# Patient Record
Sex: FEMALE | Race: WHITE | NOT HISPANIC OR LATINO | Employment: OTHER | URBAN - METROPOLITAN AREA
[De-identification: names, ages, dates, MRNs, and addresses within clinical notes are randomized per-mention and may not be internally consistent; named-entity substitution may affect disease eponyms.]

---

## 2017-01-25 ENCOUNTER — TELEPHONE (OUTPATIENT)
Dept: GASTROENTEROLOGY | Facility: CLINIC | Age: 82
End: 2017-01-25

## 2017-01-25 NOTE — TELEPHONE ENCOUNTER
----- Message from Prudence Haynes MA sent at 1/25/2017  2:22 PM CST -----  Contact: self  [phone]  States she is returning your call from before lunch today, Wednesday, 1-25-17.

## 2017-01-30 RX ORDER — ATORVASTATIN CALCIUM 20 MG/1
TABLET, FILM COATED ORAL
Qty: 90 TABLET | Refills: 0 | Status: SHIPPED | OUTPATIENT
Start: 2017-01-30 | End: 2017-05-01 | Stop reason: SDUPTHER

## 2017-01-30 RX ORDER — AMLODIPINE BESYLATE 5 MG/1
TABLET ORAL
Qty: 90 TABLET | Refills: 0 | Status: SHIPPED | OUTPATIENT
Start: 2017-01-30 | End: 2017-05-01 | Stop reason: SDUPTHER

## 2017-03-27 RX ORDER — ALENDRONATE SODIUM 70 MG/1
TABLET ORAL
Qty: 12 TABLET | Refills: 1 | Status: SHIPPED | OUTPATIENT
Start: 2017-03-27 | End: 2017-09-28 | Stop reason: SDUPTHER

## 2017-04-07 ENCOUNTER — HOSPITAL ENCOUNTER (OUTPATIENT)
Dept: RADIOLOGY | Facility: HOSPITAL | Age: 82
Discharge: HOME | End: 2017-04-07
Attending: INTERNAL MEDICINE
Payer: MEDICARE

## 2017-04-07 ENCOUNTER — TELEPHONE (OUTPATIENT)
Dept: GASTROENTEROLOGY | Facility: CLINIC | Age: 82
End: 2017-04-07

## 2017-04-07 DIAGNOSIS — K86.3 CYST AND PSEUDOCYST OF PANCREAS: ICD-10-CM

## 2017-04-07 DIAGNOSIS — K86.2 CYST AND PSEUDOCYST OF PANCREAS: ICD-10-CM

## 2017-04-07 LAB
CREAT SERPL-MCNC: 0.9 MG/DL (ref 0.5–1.4)
SAMPLE: NORMAL

## 2017-04-07 PROCEDURE — A9585 GADOBUTROL INJECTION: HCPCS | Performed by: INTERNAL MEDICINE

## 2017-04-07 PROCEDURE — 25500020 PHARM REV CODE 255: Performed by: INTERNAL MEDICINE

## 2017-04-07 PROCEDURE — 74183 MRI ABD W/O CNTR FLWD CNTR: CPT | Mod: TC

## 2017-04-07 PROCEDURE — 76376 3D RENDER W/INTRP POSTPROCES: CPT | Mod: TC

## 2017-04-07 PROCEDURE — 74183 MRI ABD W/O CNTR FLWD CNTR: CPT | Mod: 26,GC,, | Performed by: RADIOLOGY

## 2017-04-07 PROCEDURE — 76376 3D RENDER W/INTRP POSTPROCES: CPT | Mod: 26,GC,, | Performed by: RADIOLOGY

## 2017-04-07 RX ORDER — GADOBUTROL 604.72 MG/ML
10 INJECTION INTRAVENOUS
Status: COMPLETED | OUTPATIENT
Start: 2017-04-07 | End: 2017-04-07

## 2017-04-07 RX ADMIN — GADOBUTROL 10 ML: 604.72 INJECTION INTRAVENOUS at 10:04

## 2017-04-07 NOTE — TELEPHONE ENCOUNTER
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

## 2017-04-25 ENCOUNTER — LAB VISIT (OUTPATIENT)
Dept: LAB | Facility: HOSPITAL | Age: 82
End: 2017-04-25
Attending: INTERNAL MEDICINE
Payer: MEDICARE

## 2017-04-25 DIAGNOSIS — E11.9 TYPE 2 DIABETES MELLITUS WITHOUT COMPLICATION: ICD-10-CM

## 2017-04-25 PROCEDURE — 83036 HEMOGLOBIN GLYCOSYLATED A1C: CPT

## 2017-04-25 PROCEDURE — 36415 COLL VENOUS BLD VENIPUNCTURE: CPT | Mod: PO

## 2017-04-26 LAB
ESTIMATED AVG GLUCOSE: 146 MG/DL
HBA1C MFR BLD HPLC: 6.7 %

## 2017-05-01 ENCOUNTER — TELEPHONE (OUTPATIENT)
Dept: INTERNAL MEDICINE | Facility: CLINIC | Age: 82
End: 2017-05-01

## 2017-05-01 RX ORDER — GLIMEPIRIDE 2 MG/1
TABLET ORAL
Qty: 90 TABLET | Refills: 0 | Status: SHIPPED | OUTPATIENT
Start: 2017-05-01 | End: 2017-07-29 | Stop reason: SDUPTHER

## 2017-05-01 RX ORDER — AMLODIPINE BESYLATE 5 MG/1
TABLET ORAL
Qty: 90 TABLET | Refills: 0 | Status: SHIPPED | OUTPATIENT
Start: 2017-05-01 | End: 2017-07-29 | Stop reason: SDUPTHER

## 2017-05-01 RX ORDER — ATORVASTATIN CALCIUM 20 MG/1
TABLET, FILM COATED ORAL
Qty: 90 TABLET | Refills: 0 | Status: SHIPPED | OUTPATIENT
Start: 2017-05-01 | End: 2017-07-29 | Stop reason: SDUPTHER

## 2017-05-01 RX ORDER — LISINOPRIL AND HYDROCHLOROTHIAZIDE 10; 12.5 MG/1; MG/1
TABLET ORAL
Qty: 152 TABLET | Refills: 2 | Status: SHIPPED | OUTPATIENT
Start: 2017-05-01 | End: 2018-01-16 | Stop reason: SDUPTHER

## 2017-05-01 RX ORDER — MONTELUKAST SODIUM 10 MG/1
TABLET ORAL
Qty: 90 TABLET | Refills: 2 | Status: SHIPPED | OUTPATIENT
Start: 2017-05-01 | End: 2017-10-20 | Stop reason: SDUPTHER

## 2017-05-02 NOTE — TELEPHONE ENCOUNTER
Left a message with pt's  to have Mrs. Costa to call the office for results.    Verbalized understanding.

## 2017-05-05 ENCOUNTER — TELEPHONE (OUTPATIENT)
Dept: INTERNAL MEDICINE | Facility: CLINIC | Age: 82
End: 2017-05-05

## 2017-05-05 NOTE — TELEPHONE ENCOUNTER
----- Message from Ria Cary sent at 5/4/2017  4:03 PM CDT -----  Contact: pt [phone]  Patient is returning a phone call.  Who left a message for the patient: Oc  Does patient know what this is regarding:  A message was sent  Comments:

## 2017-07-12 ENCOUNTER — TELEPHONE (OUTPATIENT)
Dept: INTERNAL MEDICINE | Facility: CLINIC | Age: 82
End: 2017-07-12

## 2017-07-12 DIAGNOSIS — Z12.31 OTHER SCREENING MAMMOGRAM: Primary | ICD-10-CM

## 2017-07-12 NOTE — TELEPHONE ENCOUNTER
----- Message from Delia Taveras sent at 7/12/2017 10:54 AM CDT -----  Contact: Self. C all  [phone]  Requesting a mammo order.

## 2017-07-25 NOTE — PROGRESS NOTES
No chief complaint on file.  .    HPI:     Sussy Costa is a 81 y.o. female who is known to me from my previous practice with chronic sinusitis with polyposis and AR presents for evaluation of a 10 day  history of nasal congestion and postnasal drip. She describes difficulty breathing at night. There is bilateral nasal obstruction. She does use sinus rinses or nasal sprays. She admits to midface pain and pressure and along the frontal sinus region.  She admits to rhinorrhea and postnasal drip. There is not maxillary tooth pain. She  admits to headaches.  She has had sinus or nasal surgery. There is no history of sinonasal trauma. She reports prior to this she had a URI and feels like the symptoms have progressed to include sinus pressure and pain.       Past Medical History:   Diagnosis Date    Asthma     Cyst of pancreas     liver    Diabetes mellitus type II     Eczema of hand     Glaucoma     Hyperlipidemia     Hypertension     Lichen planus     gums    Osteoporosis     Pulmonary nodules      Social History     Social History    Marital status:      Spouse name: sania    Number of children: 1    Years of education: N/A     Occupational History          Social History Main Topics    Smoking status: Never Smoker    Smokeless tobacco: Never Used    Alcohol use Yes      Comment: socially    Drug use: No    Sexual activity: Yes     Partners: Male     Birth control/ protection: Post-menopausal     Other Topics Concern    Not on file     Social History Narrative    She does not exercise regularly.     Past Surgical History:   Procedure Laterality Date    CATARACT EXTRACTION, BILATERAL      CHOLECYSTECTOMY      HYSTERECTOMY      nasal polyps       Family History   Problem Relation Age of Onset    Heart disease Father     Heart disease Brother     Hypertension Brother            Review of Systems  General: negative for chills, fever or weight loss  Psychological: negative  for mood changes or depression  Ophthalmic: negative for blurry vision, photophobia or eye pain  ENT: see HPI  Respiratory: no cough, shortness of breath, or wheezing  Cardiovascular: no chest pain or dyspnea on exertion  Gastrointestinal: no abdominal pain, change in bowel habits, or black/ bloody stools  Musculoskeletal: negative for gait disturbance or muscular weakness  Neurological: no syncope or seizures; no ataxia  Dermatological: negative for puritis,  rash and jaundice  Hematologic/lymphatic: no easy bruising, no new lumps or bumps      Physical Exam:    There were no vitals filed for this visit.    Constitutional: Well appearing / communicating without difficutly.  NAD.  Eyes: EOM I Bilaterally  Head/Face: Normocephalic.  Negative paranasal sinus pressure/tenderness.  Salivary glands WNL.  House Brackmann I Bilaterally.    Right Ear: Auricle normal appearance. External Auditory Canal within normal limits,TM w/o masses/lesions/perforations. TM mobility noted.   Left Ear: Auricle normal appearance. External Auditory Canal WNL,TM w/o masses/lesions/perforations. TM mobility noted.  Nose: No gross nasal septal deviation. Inferior Turbinates edematous 3+ bilaterally. No septal perforation. No masses/lesions. External nasal skin appears normal without masses/lesions.  Oral Cavity: Gingiva/lips within normal limits.  Dentition/gingiva healthy appearing. Mucus membranes moist. Floor of mouth soft, no masses palpated. Oral Tongue mobile. Hard Palate appears normal.    Oropharynx: Base of tongue appears normal. No masses/lesions noted. Tonsillar fossa/pharyngeal wall without lesions. Posterior oropharynx WNL.  Soft palate without masses. Midline uvula.   Neck/Lymphatic: No LAD I-VI bilaterally.  No thyromegaly.  No masses noted on exam.    Mirror laryngoscopy/nasopharyngoscopy: Active gag reflex.  Unable to perform.    Neuro/Psychiatric: AOx3.  Normal mood and affect.   Cardiovascular: Normal carotid pulses  bilaterally, no increasing jugular venous distention noted at cervical region bilaterally.    Respiratory: Normal respiratory effort, no stridor, no retractions noted.      See separate procedure note for nasal endoscopy.       Assessment:    ICD-10-CM ICD-9-CM    1. Other chronic sinusitis J32.8 473.8    2. Polyp of paranasal sinus J33.8 471.8    3. Chronic non-seasonal allergic rhinitis, unspecified trigger J30.89 477.9      The primary encounter diagnosis was Other chronic sinusitis. Diagnoses of Polyp of paranasal sinus and Chronic non-seasonal allergic rhinitis, unspecified trigger were also pertinent to this visit.      Plan:      Chronic sinusitis with nasal polyposis: Cultures taken from the middle meatus.  Will start Levaquin empirically as she is allergic to PCN.  Continue nasal saline saline irrigations. Will start Flonase BID and give depo medrol IM injection today.     Rosangela Isabel MD

## 2017-07-26 ENCOUNTER — OFFICE VISIT (OUTPATIENT)
Dept: OTOLARYNGOLOGY | Facility: CLINIC | Age: 82
End: 2017-07-26
Payer: MEDICARE

## 2017-07-26 ENCOUNTER — HOSPITAL ENCOUNTER (OUTPATIENT)
Dept: RADIOLOGY | Facility: HOSPITAL | Age: 82
Discharge: HOME | End: 2017-07-26
Attending: INTERNAL MEDICINE
Payer: MEDICARE

## 2017-07-26 VITALS
HEART RATE: 76 BPM | BODY MASS INDEX: 23.68 KG/M2 | DIASTOLIC BLOOD PRESSURE: 64 MMHG | SYSTOLIC BLOOD PRESSURE: 117 MMHG | WEIGHT: 142.31 LBS | TEMPERATURE: 97 F

## 2017-07-26 DIAGNOSIS — Z12.31 OTHER SCREENING MAMMOGRAM: ICD-10-CM

## 2017-07-26 DIAGNOSIS — J32.8 OTHER CHRONIC SINUSITIS: Primary | ICD-10-CM

## 2017-07-26 DIAGNOSIS — J30.89 CHRONIC NON-SEASONAL ALLERGIC RHINITIS, UNSPECIFIED TRIGGER: ICD-10-CM

## 2017-07-26 DIAGNOSIS — J33.8 POLYP OF PARANASAL SINUS: ICD-10-CM

## 2017-07-26 PROCEDURE — 87070 CULTURE OTHR SPECIMN AEROBIC: CPT

## 2017-07-26 PROCEDURE — 87186 SC STD MICRODIL/AGAR DIL: CPT

## 2017-07-26 PROCEDURE — 77063 BREAST TOMOSYNTHESIS BI: CPT | Mod: 26,,, | Performed by: RADIOLOGY

## 2017-07-26 PROCEDURE — 77067 SCR MAMMO BI INCL CAD: CPT | Mod: TC

## 2017-07-26 PROCEDURE — 99999 PR PBB SHADOW E&M-EST. PATIENT-LVL IV: CPT | Mod: PBBFAC,,, | Performed by: OTOLARYNGOLOGY

## 2017-07-26 PROCEDURE — 96372 THER/PROPH/DIAG INJ SC/IM: CPT | Mod: PBBFAC

## 2017-07-26 PROCEDURE — 87075 CULTR BACTERIA EXCEPT BLOOD: CPT

## 2017-07-26 PROCEDURE — 77067 SCR MAMMO BI INCL CAD: CPT | Mod: 26,,, | Performed by: RADIOLOGY

## 2017-07-26 PROCEDURE — 1126F AMNT PAIN NOTED NONE PRSNT: CPT | Mod: ,,, | Performed by: OTOLARYNGOLOGY

## 2017-07-26 PROCEDURE — 87077 CULTURE AEROBIC IDENTIFY: CPT

## 2017-07-26 PROCEDURE — 99214 OFFICE O/P EST MOD 30 MIN: CPT | Mod: 25,S$PBB,, | Performed by: OTOLARYNGOLOGY

## 2017-07-26 PROCEDURE — 99214 OFFICE O/P EST MOD 30 MIN: CPT | Mod: PBBFAC | Performed by: OTOLARYNGOLOGY

## 2017-07-26 PROCEDURE — 1159F MED LIST DOCD IN RCRD: CPT | Mod: ,,, | Performed by: OTOLARYNGOLOGY

## 2017-07-26 RX ORDER — METHYLPREDNISOLONE ACETATE 40 MG/ML
40 INJECTION, SUSPENSION INTRA-ARTICULAR; INTRALESIONAL; INTRAMUSCULAR; SOFT TISSUE
Status: COMPLETED | OUTPATIENT
Start: 2017-07-26 | End: 2017-07-26

## 2017-07-26 RX ORDER — LEVOFLOXACIN 500 MG/1
500 TABLET, FILM COATED ORAL DAILY
Qty: 14 TABLET | Refills: 0 | Status: SHIPPED | OUTPATIENT
Start: 2017-07-26 | End: 2017-08-09

## 2017-07-26 RX ORDER — CICLESONIDE 50 UG/1
2 SPRAY NASAL DAILY
Qty: 12.5 G | Refills: 4 | Status: SHIPPED | OUTPATIENT
Start: 2017-07-26 | End: 2018-02-08 | Stop reason: SDUPTHER

## 2017-07-26 RX ADMIN — METHYLPREDNISOLONE ACETATE 40 MG: 40 INJECTION, SUSPENSION INTRA-ARTICULAR; INTRALESIONAL; INTRAMUSCULAR; SOFT TISSUE at 09:07

## 2017-07-26 NOTE — PROCEDURES
Nasal/sinus endoscopy  Date/Time: 7/26/2017 9:02 AM  Performed by: SELENE JIMENEZ  Authorized by: SELENE JIMENEZ     Consent Done?:  Yes (Verbal)    PROCEDURE NOTE:  Nasal endoscopy   Preprocedure diagnosis:  Chronic sinusitis  Postprocedure diangosis:  Same  Complications:  None  Blood Loss:  None    Procedure in detail:  After verbal consent was obtained, the patient's nasal cavity was anesthesized using topical 1%lidocaine and Neosynepherine.  A rigid 0 degree endoscope was placed in first the right, then the left nasal cavity.  The inferior and middle turbinates were examined, and found to be normal bilaterally.  The middle meatus and maxillary antrum was also examined, and found to be normal bilaterally.  Copious crusting and purulence seen in the bilateral middle meatus.   The patient tolerated the procedure well and there were no complications.

## 2017-07-29 LAB — BACTERIA SPEC AEROBE CULT: NORMAL

## 2017-07-31 RX ORDER — AMLODIPINE BESYLATE 5 MG/1
TABLET ORAL
Qty: 90 TABLET | Refills: 0 | Status: SHIPPED | OUTPATIENT
Start: 2017-07-31 | End: 2017-10-20 | Stop reason: SDUPTHER

## 2017-07-31 RX ORDER — ATORVASTATIN CALCIUM 20 MG/1
TABLET, FILM COATED ORAL
Qty: 90 TABLET | Refills: 0 | Status: SHIPPED | OUTPATIENT
Start: 2017-07-31 | End: 2017-10-20 | Stop reason: SDUPTHER

## 2017-07-31 RX ORDER — GLIMEPIRIDE 2 MG/1
TABLET ORAL
Qty: 90 TABLET | Refills: 0 | Status: SHIPPED | OUTPATIENT
Start: 2017-07-31 | End: 2017-10-20 | Stop reason: SDUPTHER

## 2017-08-02 LAB — BACTERIA SPEC ANAEROBE CULT: NORMAL

## 2017-08-03 NOTE — PROGRESS NOTES
No chief complaint on file.  .    HPI:     Sussy Costa is a 81 y.o. female who is known to me from my previous practice with chronic sinusitis with polyposis and AR presents for evaluation of a 10 day  history of nasal congestion and postnasal drip. She describes difficulty breathing at night. There is bilateral nasal obstruction. She does use sinus rinses or nasal sprays. She admits to midface pain and pressure and along the frontal sinus region.  She admits to rhinorrhea and postnasal drip. There is not maxillary tooth pain. She  admits to headaches.  She has had sinus or nasal surgery. There is no history of sinonasal trauma. She reports prior to this she had a URI and feels like the symptoms have progressed to include sinus pressure and pain.      Interval HPI: She reports that she is feeling better.  She states that she is having less mucus than her previous visit.  She reports less pain and pressure. She is breathing better. She is tolerating the antibiotics well while taking a probiotic.       Past Medical History:   Diagnosis Date    Asthma     Cyst of pancreas     liver    Diabetes mellitus type II     Eczema of hand     Glaucoma     Hyperlipidemia     Hypertension     Lichen planus     gums    Osteoporosis     Pulmonary nodules      Social History     Social History    Marital status:      Spouse name: sania    Number of children: 1    Years of education: N/A     Occupational History          Social History Main Topics    Smoking status: Never Smoker    Smokeless tobacco: Never Used    Alcohol use Yes      Comment: socially    Drug use: No    Sexual activity: Yes     Partners: Male     Birth control/ protection: Post-menopausal     Other Topics Concern    Not on file     Social History Narrative    She does not exercise regularly.     Past Surgical History:   Procedure Laterality Date    CATARACT EXTRACTION, BILATERAL      CHOLECYSTECTOMY      HYSTERECTOMY       nasal polyps       Family History   Problem Relation Age of Onset    Heart disease Father     Heart disease Brother     Hypertension Brother            Review of Systems  General: negative for chills, fever or weight loss  Psychological: negative for mood changes or depression  Ophthalmic: negative for blurry vision, photophobia or eye pain  ENT: see HPI  Respiratory: no cough, shortness of breath, or wheezing  Cardiovascular: no chest pain or dyspnea on exertion  Gastrointestinal: no abdominal pain, change in bowel habits, or black/ bloody stools  Musculoskeletal: negative for gait disturbance or muscular weakness  Neurological: no syncope or seizures; no ataxia  Dermatological: negative for puritis,  rash and jaundice  Hematologic/lymphatic: no easy bruising, no new lumps or bumps      Physical Exam:    There were no vitals filed for this visit.    Constitutional: Well appearing / communicating without difficutly.  NAD.  Eyes: EOM I Bilaterally  Head/Face: Normocephalic.  Negative paranasal sinus pressure/tenderness.  Salivary glands WNL.  House Brackmann I Bilaterally.    Right Ear: Auricle normal appearance. External Auditory Canal within normal limits,TM w/o masses/lesions/perforations. TM mobility noted.   Left Ear: Auricle normal appearance. External Auditory Canal WNL,TM w/o masses/lesions/perforations. TM mobility noted.  Nose: No gross nasal septal deviation. Inferior Turbinates edematous 3+ bilaterally. No septal perforation. No masses/lesions. External nasal skin appears normal without masses/lesions.  Oral Cavity: Gingiva/lips within normal limits.  Dentition/gingiva healthy appearing. Mucus membranes moist. Floor of mouth soft, no masses palpated. Oral Tongue mobile. Hard Palate appears normal.    Oropharynx: Base of tongue appears normal. No masses/lesions noted. Tonsillar fossa/pharyngeal wall without lesions. Posterior oropharynx WNL.  Soft palate without masses. Midline uvula.    Neck/Lymphatic: No LAD I-VI bilaterally.  No thyromegaly.  No masses noted on exam.    Mirror laryngoscopy/nasopharyngoscopy: Active gag reflex.  Unable to perform.    Neuro/Psychiatric: AOx3.  Normal mood and affect.   Cardiovascular: Normal carotid pulses bilaterally, no increasing jugular venous distention noted at cervical region bilaterally.    Respiratory: Normal respiratory effort, no stridor, no retractions noted.      Cultures show psuedomonas a. pansensitive    Assessment:    ICD-10-CM ICD-9-CM    1. Other chronic sinusitis J32.8 473.8    2. Polyp of paranasal sinus J33.8 471.8    3. Chronic non-seasonal allergic rhinitis, unspecified trigger J30.89 477.9      The primary encounter diagnosis was Other chronic sinusitis. Diagnoses of Polyp of paranasal sinus and Chronic non-seasonal allergic rhinitis, unspecified trigger were also pertinent to this visit.      Plan:    Chronic sinusitis with nasal polyposis:   Complete Levaquin as she is allergic to PCN.  Continue nasal saline saline irrigations. Follow up in 2 weeks- plan repeat culture at that time if still with purulent crusting to ensure resolution of psuedomonas.     Rosangela Isabel MD

## 2017-08-04 ENCOUNTER — OFFICE VISIT (OUTPATIENT)
Dept: OTOLARYNGOLOGY | Facility: CLINIC | Age: 82
End: 2017-08-04
Payer: MEDICARE

## 2017-08-04 VITALS
HEART RATE: 80 BPM | DIASTOLIC BLOOD PRESSURE: 73 MMHG | TEMPERATURE: 97 F | HEIGHT: 65 IN | WEIGHT: 142.31 LBS | BODY MASS INDEX: 23.71 KG/M2 | SYSTOLIC BLOOD PRESSURE: 123 MMHG

## 2017-08-04 DIAGNOSIS — J30.89 CHRONIC NON-SEASONAL ALLERGIC RHINITIS, UNSPECIFIED TRIGGER: ICD-10-CM

## 2017-08-04 DIAGNOSIS — J32.8 OTHER CHRONIC SINUSITIS: Primary | ICD-10-CM

## 2017-08-04 DIAGNOSIS — J33.8 POLYP OF PARANASAL SINUS: ICD-10-CM

## 2017-08-04 PROCEDURE — 99213 OFFICE O/P EST LOW 20 MIN: CPT | Mod: S$PBB,,, | Performed by: OTOLARYNGOLOGY

## 2017-08-04 PROCEDURE — 1126F AMNT PAIN NOTED NONE PRSNT: CPT | Mod: ,,, | Performed by: OTOLARYNGOLOGY

## 2017-08-04 PROCEDURE — 99213 OFFICE O/P EST LOW 20 MIN: CPT | Mod: PBBFAC | Performed by: OTOLARYNGOLOGY

## 2017-08-04 PROCEDURE — 99999 PR PBB SHADOW E&M-EST. PATIENT-LVL III: CPT | Mod: PBBFAC,,, | Performed by: OTOLARYNGOLOGY

## 2017-08-04 PROCEDURE — 3008F BODY MASS INDEX DOCD: CPT | Mod: ,,, | Performed by: OTOLARYNGOLOGY

## 2017-08-04 PROCEDURE — 1159F MED LIST DOCD IN RCRD: CPT | Mod: ,,, | Performed by: OTOLARYNGOLOGY

## 2017-08-04 RX ORDER — FLUOCINONIDE 0.5 MG/G
CREAM TOPICAL
COMMUNITY
Start: 2017-05-01 | End: 2018-01-03 | Stop reason: SDUPTHER

## 2017-09-08 ENCOUNTER — OFFICE VISIT (OUTPATIENT)
Dept: OTOLARYNGOLOGY | Facility: CLINIC | Age: 82
End: 2017-09-08
Payer: MEDICARE

## 2017-09-08 VITALS
TEMPERATURE: 98 F | BODY MASS INDEX: 22.78 KG/M2 | DIASTOLIC BLOOD PRESSURE: 65 MMHG | SYSTOLIC BLOOD PRESSURE: 118 MMHG | WEIGHT: 136.88 LBS | HEART RATE: 76 BPM

## 2017-09-08 DIAGNOSIS — J30.89 CHRONIC NON-SEASONAL ALLERGIC RHINITIS, UNSPECIFIED TRIGGER: ICD-10-CM

## 2017-09-08 DIAGNOSIS — J33.8 POLYP OF PARANASAL SINUS: ICD-10-CM

## 2017-09-08 DIAGNOSIS — J32.8 OTHER CHRONIC SINUSITIS: Primary | ICD-10-CM

## 2017-09-08 PROCEDURE — 1159F MED LIST DOCD IN RCRD: CPT | Mod: ,,, | Performed by: OTOLARYNGOLOGY

## 2017-09-08 PROCEDURE — 99999 PR PBB SHADOW E&M-EST. PATIENT-LVL IV: CPT | Mod: PBBFAC,,, | Performed by: OTOLARYNGOLOGY

## 2017-09-08 PROCEDURE — 99213 OFFICE O/P EST LOW 20 MIN: CPT | Mod: S$PBB,,, | Performed by: OTOLARYNGOLOGY

## 2017-09-08 PROCEDURE — 1126F AMNT PAIN NOTED NONE PRSNT: CPT | Mod: ,,, | Performed by: OTOLARYNGOLOGY

## 2017-09-08 PROCEDURE — 99214 OFFICE O/P EST MOD 30 MIN: CPT | Mod: PBBFAC | Performed by: OTOLARYNGOLOGY

## 2017-09-08 PROCEDURE — 3074F SYST BP LT 130 MM HG: CPT | Mod: ,,, | Performed by: OTOLARYNGOLOGY

## 2017-09-08 PROCEDURE — 3078F DIAST BP <80 MM HG: CPT | Mod: ,,, | Performed by: OTOLARYNGOLOGY

## 2017-09-08 NOTE — PROGRESS NOTES
Chief Complaint   Patient presents with    Allergies     follow up   .    HPI:     Sussy Costa is a 81 y.o. female who is known to me from my previous practice with chronic sinusitis with polyposis and AR presents for evaluation of a 10 day  history of nasal congestion and postnasal drip. She describes difficulty breathing at night. There is bilateral nasal obstruction. She does use sinus rinses or nasal sprays. She admits to midface pain and pressure and along the frontal sinus region.  She admits to rhinorrhea and postnasal drip. There is not maxillary tooth pain. She  admits to headaches.  She has had sinus or nasal surgery. There is no history of sinonasal trauma. She reports prior to this she had a URI and feels like the symptoms have progressed to include sinus pressure and pain.      Interval HPI: She reports that she is feeling better.  She states that she is having no further mucus after completing Levaquin. She has been doing daily nasal saline rinses that have been clear.  She reports less pain and pressure. She is breathing better. She is tolerating the antibiotics well while taking a probiotic.       Past Medical History:   Diagnosis Date    Asthma     Cyst of pancreas     liver    Diabetes mellitus type II     Eczema of hand     Glaucoma     Hyperlipidemia     Hypertension     Lichen planus     gums    Osteoporosis     Pulmonary nodules      Social History     Social History    Marital status:      Spouse name: sania    Number of children: 1    Years of education: N/A     Occupational History          Social History Main Topics    Smoking status: Never Smoker    Smokeless tobacco: Never Used    Alcohol use Yes      Comment: socially    Drug use: No    Sexual activity: Yes     Partners: Male     Birth control/ protection: Post-menopausal     Other Topics Concern    Not on file     Social History Narrative    She does not exercise regularly.     Past  Surgical History:   Procedure Laterality Date    CATARACT EXTRACTION, BILATERAL      CHOLECYSTECTOMY      HYSTERECTOMY      nasal polyps       Family History   Problem Relation Age of Onset    Heart disease Father     Heart disease Brother     Hypertension Brother            Review of Systems  General: negative for chills, fever or weight loss  Psychological: negative for mood changes or depression  Ophthalmic: negative for blurry vision, photophobia or eye pain  ENT: see HPI  Respiratory: no cough, shortness of breath, or wheezing  Cardiovascular: no chest pain or dyspnea on exertion  Gastrointestinal: no abdominal pain, change in bowel habits, or black/ bloody stools  Musculoskeletal: negative for gait disturbance or muscular weakness  Neurological: no syncope or seizures; no ataxia  Dermatological: negative for puritis,  rash and jaundice  Hematologic/lymphatic: no easy bruising, no new lumps or bumps      Physical Exam:    Vitals:    09/08/17 1602   BP: 118/65   Pulse: 76   Temp: 97.6 °F (36.4 °C)       Constitutional: Well appearing / communicating without difficutly.  NAD.  Eyes: EOM I Bilaterally  Head/Face: Normocephalic.  Negative paranasal sinus pressure/tenderness.  Salivary glands WNL.  House Brackmann I Bilaterally.    Right Ear: Auricle normal appearance. External Auditory Canal within normal limits,TM w/o masses/lesions/perforations. TM mobility noted.   Left Ear: Auricle normal appearance. External Auditory Canal WNL,TM w/o masses/lesions/perforations. TM mobility noted.  Nose: No gross nasal septal deviation. Inferior Turbinates edematous 3+ bilaterally. No septal perforation. No masses/lesions. External nasal skin appears normal without masses/lesions.  Oral Cavity: Gingiva/lips within normal limits.  Dentition/gingiva healthy appearing. Mucus membranes moist. Floor of mouth soft, no masses palpated. Oral Tongue mobile. Hard Palate appears normal.    Oropharynx: Base of tongue appears  normal. No masses/lesions noted. Tonsillar fossa/pharyngeal wall without lesions. Posterior oropharynx WNL.  Soft palate without masses. Midline uvula.   Neck/Lymphatic: No LAD I-VI bilaterally.  No thyromegaly.  No masses noted on exam.    Mirror laryngoscopy/nasopharyngoscopy: Active gag reflex.  Unable to perform.    Neuro/Psychiatric: AOx3.  Normal mood and affect.   Cardiovascular: Normal carotid pulses bilaterally, no increasing jugular venous distention noted at cervical region bilaterally.    Respiratory: Normal respiratory effort, no stridor, no retractions noted.        Assessment:    ICD-10-CM ICD-9-CM    1. Other chronic sinusitis J32.8 473.8    2. Polyp of paranasal sinus J33.8 471.8    3. Chronic non-seasonal allergic rhinitis, unspecified trigger J30.89 477.9      The primary encounter diagnosis was Other chronic sinusitis. Diagnoses of Polyp of paranasal sinus and Chronic non-seasonal allergic rhinitis, unspecified trigger were also pertinent to this visit.      Plan:      Continue nasal saline saline irrigations. Continue singulair and Flonase OTC. Follow up in 6 mos for recheck.     .Rosangela Isabel MD

## 2017-09-12 RX ORDER — ALBUTEROL SULFATE 90 UG/1
AEROSOL, METERED RESPIRATORY (INHALATION)
Qty: 18 G | Refills: 5 | Status: SHIPPED | OUTPATIENT
Start: 2017-09-12 | End: 2019-10-28 | Stop reason: SDUPTHER

## 2017-09-28 RX ORDER — ALENDRONATE SODIUM 70 MG/1
TABLET ORAL
Qty: 12 TABLET | Refills: 0 | Status: SHIPPED | OUTPATIENT
Start: 2017-09-28 | End: 2017-11-09

## 2017-10-03 RX ORDER — ALENDRONATE SODIUM 70 MG/1
TABLET ORAL
Qty: 12 TABLET | Refills: 0 | Status: SHIPPED | OUTPATIENT
Start: 2017-10-03 | End: 2018-02-05

## 2017-10-05 RX ORDER — FLUTICASONE PROPIONATE AND SALMETEROL 500; 50 UG/1; UG/1
1 POWDER RESPIRATORY (INHALATION) 2 TIMES DAILY
Qty: 180 EACH | Refills: 3 | Status: SHIPPED | OUTPATIENT
Start: 2017-10-05 | End: 2020-01-14 | Stop reason: SDUPTHER

## 2017-10-05 NOTE — TELEPHONE ENCOUNTER
----- Message from Red Somers sent at 10/5/2017  8:17 AM CDT -----  Contact: Self [phone]  Pt would like to speak with the nurse she states she left a message yesterday and has not heard back regarding a refill on ADVAIR DISKUS sent to [facility] mail order,please call

## 2017-10-19 ENCOUNTER — TELEPHONE (OUTPATIENT)
Dept: INTERNAL MEDICINE | Facility: CLINIC | Age: 82
End: 2017-10-19

## 2017-10-19 DIAGNOSIS — I10 ESSENTIAL HYPERTENSION, BENIGN: Primary | ICD-10-CM

## 2017-10-19 DIAGNOSIS — E78.5 HYPERLIPIDEMIA, UNSPECIFIED HYPERLIPIDEMIA TYPE: ICD-10-CM

## 2017-10-19 DIAGNOSIS — E11.9 DIABETES MELLITUS WITHOUT COMPLICATION: ICD-10-CM

## 2017-10-19 RX ORDER — MONTELUKAST SODIUM 10 MG/1
TABLET ORAL
Qty: 90 TABLET | Refills: 1 | OUTPATIENT
Start: 2017-10-19

## 2017-10-19 RX ORDER — ATORVASTATIN CALCIUM 20 MG/1
TABLET, FILM COATED ORAL
Qty: 90 TABLET | Refills: 0 | OUTPATIENT
Start: 2017-10-19

## 2017-10-19 RX ORDER — AMLODIPINE BESYLATE 5 MG/1
TABLET ORAL
Qty: 90 TABLET | Refills: 0 | OUTPATIENT
Start: 2017-10-19

## 2017-10-19 RX ORDER — GLIMEPIRIDE 2 MG/1
TABLET ORAL
Qty: 90 TABLET | Refills: 0 | OUTPATIENT
Start: 2017-10-19

## 2017-10-19 NOTE — TELEPHONE ENCOUNTER
Spoke with pt to have her scheduled for f/u on HTN, DM.    Verbalized understanding and agreeable to scheduled appt on 11/9 and will have f/u labs on 11/2/17.

## 2017-10-19 NOTE — TELEPHONE ENCOUNTER
----- Message from Josiane Anaya sent at 10/19/2017 12:10 PM CDT -----  Contact: Self [phone]  Patient is returning a phone call.  Who left a message for the patient: Oc  Does patient know what this is regarding:    Comments:

## 2017-10-20 RX ORDER — GLIMEPIRIDE 2 MG/1
TABLET ORAL
Qty: 90 TABLET | Refills: 0 | Status: SHIPPED | OUTPATIENT
Start: 2017-10-20 | End: 2018-01-16 | Stop reason: SDUPTHER

## 2017-10-20 RX ORDER — AMLODIPINE BESYLATE 5 MG/1
TABLET ORAL
Qty: 90 TABLET | Refills: 0 | Status: SHIPPED | OUTPATIENT
Start: 2017-10-20 | End: 2018-01-16 | Stop reason: SDUPTHER

## 2017-10-20 RX ORDER — ATORVASTATIN CALCIUM 20 MG/1
TABLET, FILM COATED ORAL
Qty: 90 TABLET | Refills: 0 | Status: SHIPPED | OUTPATIENT
Start: 2017-10-20 | End: 2018-01-16 | Stop reason: SDUPTHER

## 2017-10-20 RX ORDER — MONTELUKAST SODIUM 10 MG/1
TABLET ORAL
Qty: 90 TABLET | Refills: 1 | Status: SHIPPED | OUTPATIENT
Start: 2017-10-20 | End: 2018-04-16 | Stop reason: SDUPTHER

## 2017-11-02 ENCOUNTER — LAB VISIT (OUTPATIENT)
Dept: LAB | Facility: HOSPITAL | Age: 82
End: 2017-11-02
Attending: INTERNAL MEDICINE
Payer: MEDICARE

## 2017-11-02 DIAGNOSIS — E11.9 DIABETES MELLITUS WITHOUT COMPLICATION: ICD-10-CM

## 2017-11-02 DIAGNOSIS — I10 ESSENTIAL HYPERTENSION, BENIGN: ICD-10-CM

## 2017-11-02 DIAGNOSIS — E78.5 HYPERLIPIDEMIA, UNSPECIFIED HYPERLIPIDEMIA TYPE: ICD-10-CM

## 2017-11-02 LAB
ALBUMIN SERPL BCP-MCNC: 3.5 G/DL
ALP SERPL-CCNC: 60 U/L
ALT SERPL W/O P-5'-P-CCNC: 18 U/L
ANION GAP SERPL CALC-SCNC: 10 MMOL/L
AST SERPL-CCNC: 22 U/L
BASOPHILS # BLD AUTO: 0.11 K/UL
BASOPHILS NFR BLD: 0.8 %
BILIRUB SERPL-MCNC: 0.6 MG/DL
BUN SERPL-MCNC: 22 MG/DL
CALCIUM SERPL-MCNC: 10.5 MG/DL
CHLORIDE SERPL-SCNC: 105 MMOL/L
CHOLEST SERPL-MCNC: 130 MG/DL
CHOLEST/HDLC SERPL: 2.5 {RATIO}
CO2 SERPL-SCNC: 27 MMOL/L
CREAT SERPL-MCNC: 1 MG/DL
DIFFERENTIAL METHOD: ABNORMAL
EOSINOPHIL # BLD AUTO: 1.4 K/UL
EOSINOPHIL NFR BLD: 10.7 %
ERYTHROCYTE [DISTWIDTH] IN BLOOD BY AUTOMATED COUNT: 14.1 %
EST. GFR  (AFRICAN AMERICAN): >60 ML/MIN/1.73 M^2
EST. GFR  (NON AFRICAN AMERICAN): 53 ML/MIN/1.73 M^2
ESTIMATED AVG GLUCOSE: 148 MG/DL
GLUCOSE SERPL-MCNC: 110 MG/DL
HBA1C MFR BLD HPLC: 6.8 %
HCT VFR BLD AUTO: 36.4 %
HDLC SERPL-MCNC: 52 MG/DL
HDLC SERPL: 40 %
HGB BLD-MCNC: 11.7 G/DL
IMM GRANULOCYTES # BLD AUTO: 0.05 K/UL
IMM GRANULOCYTES NFR BLD AUTO: 0.4 %
LDLC SERPL CALC-MCNC: 58.8 MG/DL
LYMPHOCYTES # BLD AUTO: 3.1 K/UL
LYMPHOCYTES NFR BLD: 23.7 %
MCH RBC QN AUTO: 29.6 PG
MCHC RBC AUTO-ENTMCNC: 32.1 G/DL
MCV RBC AUTO: 92 FL
MONOCYTES # BLD AUTO: 1.1 K/UL
MONOCYTES NFR BLD: 8.6 %
NEUTROPHILS # BLD AUTO: 7.4 K/UL
NEUTROPHILS NFR BLD: 55.8 %
NONHDLC SERPL-MCNC: 78 MG/DL
NRBC BLD-RTO: 0 /100 WBC
PLATELET # BLD AUTO: 322 K/UL
PMV BLD AUTO: 12 FL
POTASSIUM SERPL-SCNC: 3.6 MMOL/L
PROT SERPL-MCNC: 7.3 G/DL
RBC # BLD AUTO: 3.95 M/UL
SODIUM SERPL-SCNC: 142 MMOL/L
TRIGL SERPL-MCNC: 96 MG/DL
TSH SERPL DL<=0.005 MIU/L-ACNC: 2.27 UIU/ML
WBC # BLD AUTO: 13.24 K/UL

## 2017-11-02 PROCEDURE — 80053 COMPREHEN METABOLIC PANEL: CPT

## 2017-11-02 PROCEDURE — 80061 LIPID PANEL: CPT

## 2017-11-02 PROCEDURE — 85025 COMPLETE CBC W/AUTO DIFF WBC: CPT

## 2017-11-02 PROCEDURE — 84443 ASSAY THYROID STIM HORMONE: CPT

## 2017-11-02 PROCEDURE — 83036 HEMOGLOBIN GLYCOSYLATED A1C: CPT

## 2017-11-02 PROCEDURE — 36415 COLL VENOUS BLD VENIPUNCTURE: CPT | Mod: PO

## 2017-11-09 ENCOUNTER — OFFICE VISIT (OUTPATIENT)
Dept: PODIATRY | Facility: CLINIC | Age: 82
End: 2017-11-09
Payer: MEDICARE

## 2017-11-09 ENCOUNTER — OFFICE VISIT (OUTPATIENT)
Dept: INTERNAL MEDICINE | Facility: CLINIC | Age: 82
End: 2017-11-09
Payer: MEDICARE

## 2017-11-09 VITALS
RESPIRATION RATE: 22 BRPM | HEART RATE: 103 BPM | TEMPERATURE: 98 F | DIASTOLIC BLOOD PRESSURE: 74 MMHG | BODY MASS INDEX: 23.21 KG/M2 | HEIGHT: 65 IN | WEIGHT: 139.31 LBS | SYSTOLIC BLOOD PRESSURE: 156 MMHG

## 2017-11-09 VITALS
HEIGHT: 65 IN | BODY MASS INDEX: 22.66 KG/M2 | HEART RATE: 91 BPM | SYSTOLIC BLOOD PRESSURE: 150 MMHG | WEIGHT: 136 LBS | DIASTOLIC BLOOD PRESSURE: 73 MMHG

## 2017-11-09 DIAGNOSIS — E11.9 DIABETES MELLITUS WITHOUT COMPLICATION: Primary | ICD-10-CM

## 2017-11-09 DIAGNOSIS — Z78.0 POSTMENOPAUSAL STATUS: ICD-10-CM

## 2017-11-09 DIAGNOSIS — E11.9 ENCOUNTER FOR DIABETIC FOOT EXAM: ICD-10-CM

## 2017-11-09 DIAGNOSIS — L60.9 LOOSE TOENAIL: Primary | ICD-10-CM

## 2017-11-09 DIAGNOSIS — I15.2 HYPERTENSION ASSOCIATED WITH DIABETES: ICD-10-CM

## 2017-11-09 DIAGNOSIS — E11.69 DYSLIPIDEMIA ASSOCIATED WITH TYPE 2 DIABETES MELLITUS: ICD-10-CM

## 2017-11-09 DIAGNOSIS — J45.31 MILD PERSISTENT ASTHMATIC BRONCHITIS WITH ACUTE EXACERBATION: ICD-10-CM

## 2017-11-09 DIAGNOSIS — E78.5 DYSLIPIDEMIA ASSOCIATED WITH TYPE 2 DIABETES MELLITUS: ICD-10-CM

## 2017-11-09 DIAGNOSIS — M81.0 SENILE OSTEOPOROSIS: ICD-10-CM

## 2017-11-09 DIAGNOSIS — E11.59 HYPERTENSION ASSOCIATED WITH DIABETES: ICD-10-CM

## 2017-11-09 PROCEDURE — 99214 OFFICE O/P EST MOD 30 MIN: CPT | Mod: S$PBB,,, | Performed by: INTERNAL MEDICINE

## 2017-11-09 PROCEDURE — 99999 PR PBB SHADOW E&M-EST. PATIENT-LVL III: CPT | Mod: PBBFAC,,, | Performed by: PODIATRIST

## 2017-11-09 PROCEDURE — 99213 OFFICE O/P EST LOW 20 MIN: CPT | Mod: PBBFAC,27,PO | Performed by: INTERNAL MEDICINE

## 2017-11-09 PROCEDURE — 99999 PR PBB SHADOW E&M-EST. PATIENT-LVL III: CPT | Mod: PBBFAC,,, | Performed by: INTERNAL MEDICINE

## 2017-11-09 PROCEDURE — 99203 OFFICE O/P NEW LOW 30 MIN: CPT | Mod: S$PBB,,, | Performed by: PODIATRIST

## 2017-11-09 PROCEDURE — 99213 OFFICE O/P EST LOW 20 MIN: CPT | Mod: PBBFAC,PO | Performed by: PODIATRIST

## 2017-11-09 RX ORDER — LEVOFLOXACIN 500 MG/1
500 TABLET, FILM COATED ORAL DAILY
Qty: 7 TABLET | Refills: 0 | Status: ON HOLD | OUTPATIENT
Start: 2017-11-09 | End: 2017-12-30 | Stop reason: HOSPADM

## 2017-11-09 RX ORDER — INSULIN PUMP SYRINGE, 3 ML
EACH MISCELLANEOUS
Qty: 1 EACH | Refills: 0 | Status: SHIPPED | OUTPATIENT
Start: 2017-11-09 | End: 2020-01-07

## 2017-11-09 NOTE — PROGRESS NOTES
CC:     Foot exam       HPI:   The patient is a 82 y.o.  female  who presents for a diabetic foot exam.     Patient reports no presence of abnormal sensation to the feet .    History of diabetic foot ulcers: none   History of foot surgery: none.     Shoes worn today:  Sandals  She complains of left loose toenail, bumped her foot a couple a weeks ago and injured her toenail.   No toe pain today.            Primary care doctor is: EZ Casillas MD  Chief Complaint   Patient presents with    Diabetic Foot Exam     dr casillas  11/9/17    Nail Problem     left great toe           Past Medical History:   Diagnosis Date    Asthma     Cyst of pancreas     liver    Diabetes mellitus type II     Eczema of hand     Glaucoma     Hyperlipidemia     Hypertension     Lichen planus     gums    Osteoporosis     Pulmonary nodules            Current Outpatient Prescriptions on File Prior to Visit   Medication Sig Dispense Refill    acetaminophen (TYLENOL EXTRA STRENGTH) 500 MG tablet Take by mouth.      alendronate (FOSAMAX) 70 MG tablet TAKE ONE TABLET BY MOUTH EVERY 7 DAYS 12 tablet 0    amlodipine (NORVASC) 5 MG tablet TAKE ONE TABLET BY MOUTH ONE TIME DAILY  90 tablet 0    ascorbic acid (VITAMIN C) 100 MG tablet Take 100 mg by mouth once daily.      atorvastatin (LIPITOR) 20 MG tablet TAKE ONE TABLET BY MOUTH IN THE EVENING FOR CHOLESTEROL 90 tablet 0    blood sugar diagnostic (CONTOUR TEST STRIPS) Strp 1 strip by MISCELLANEOUS route 2 (two) times daily. 100 each 6    blood sugar diagnostic Strp 1 strip by Misc.(Non-Drug; Combo Route) route once daily. 100 strip 11    calcium carbonate 220 mg capsule Take 250 mg by mouth 2 (two) times daily with meals.        cod liver oil Oil Take by mouth.      fluocinonide (LIDEX) 0.05 % gel Apply 1 application topically 2 (two) times daily.        fluocinonide 0.05% (LIDEX) 0.05 % cream       fluticasone-salmeterol 500-50 mcg/dose (ADVAIR) 500-50 mcg/dose DsDv diskus  inhaler Inhale 1 puff into the lungs 2 (two) times daily. 180 each 3    glimepiride (AMARYL) 2 MG tablet TAKE ONE TABLET BY MOUTH IN THE MORNING WITH BREAKFAST 90 tablet 0    lancets (MICROLET LANCET) Misc Check blood sugar 2 times daily 100 each 3    latanoprost (XALATAN) 0.005 % ophthalmic solution Inject into the eye. 1 Drops Ophthalmic Every evening      levalbuterol (XOPENEX) 0.63 mg/3 mL nebulizer solution Take 3 mLs (0.63 mg total) by nebulization 3 (three) times daily as needed for Wheezing. 36 mL 6    lisinopril-hydrochlorothiazide (PRINZIDE,ZESTORETIC) 10-12.5 mg per tablet TAKE TWO TABLETS BY MOUTH DAILY  152 tablet 2    meclizine (ANTIVERT) 12.5 mg tablet Take 1 tablet (12.5 mg total) by mouth 2 (two) times daily as needed. 30 tablet 0    metformin (GLUCOPHAGE-XR) 500 MG 24 hr tablet Take 1 tablet (500 mg total) by mouth daily with breakfast. 90 tablet 5    montelukast (SINGULAIR) 10 mg tablet TAKE ONE TABLET BY MOUTH NIGHTLY 90 tablet 1    NEBULIZER ACCESSORIES (NEBULIZER MISC)       neomycin-polymyxin-dexamethasone (MAXITROL) 3.5mg/mL-10,000 unit/mL-0.1 % DrpS       olopatadine (PATANOL) 0.1 % ophthalmic solution Inject into the eye. 1 Drops Ophthalmic Twice a day       OMNARIS 50 mcg Spry 2 sprays by Each Nare route once daily. 12.5 g 4    SODIUM CHLORIDE/SODIUM BICARB (NEILMED SINUS RINSE COMPLETE NASL) by Nasal route 2 (two) times daily. Uses in distilled water.      triamcinolone acetonide 0.1% (KENALOG) 0.1 % cream Apply topically 2 (two) times daily. 454 g 1    VENTOLIN HFA 90 mcg/actuation inhaler INHALE 2 PUFFS INTO THE LUNGS EVERY 6 HOURS AS NEEDED 18 g 5    vitamin D 185 MG Tab Take 185 mg by mouth once daily.        [DISCONTINUED] alendronate (FOSAMAX) 70 MG tablet TAKE ONE TABLET BY MOUTH EVERY 7 DAYS 12 tablet 0     No current facility-administered medications on file prior to visit.            Review of patient's allergies indicates:   Allergen Reactions    Aspirin Other  (See Comments)     Asthma    TRIAD OF NASAL POLYPS, ASA  ALLERGY AND ASTHMA.        Aspirin Other (See Comments)    Nsaids (non-steroidal anti-inflammatory drug) Other (See Comments)     AVOID DUE TO TRIAD OF ASA ALLERGY, NASAL POLYPS,AND ASTHMA    Penicillin g Other (See Comments)           Social History     Social History    Marital status:      Spouse name: sania    Number of children: 1    Years of education: N/A     Occupational History          Social History Main Topics    Smoking status: Never Smoker    Smokeless tobacco: Never Used    Alcohol use Yes      Comment: socially    Drug use: No    Sexual activity: Yes     Partners: Male     Birth control/ protection: Post-menopausal     Other Topics Concern    Not on file     Social History Narrative    She does not exercise regularly.           ROS:  General ROS: negative  Respiratory ROS: no cough, shortness of breath, or wheezing  Cardiovascular ROS: no chest pain or dyspnea on exertion  Musculoskeletal ROS: negative  Neurological ROS:   negative for - numbness/tingling  Dermatological ROS: negative      LAST HbA1c:   Hemoglobin A1C   Date Value Ref Range Status   11/02/2017 6.8 (H) 4.0 - 5.6 % Final     Comment:     According to ADA guidelines, hemoglobin A1c <7.0% represents  optimal control in non-pregnant diabetic patients. Different  metrics may apply to specific patient populations.   Standards of Medical Care in Diabetes-2016.  For the purpose of screening for the presence of diabetes:  <5.7%     Consistent with the absence of diabetes  5.7-6.4%  Consistent with increasing risk for diabetes   (prediabetes)  >or=6.5%  Consistent with diabetes  Currently, no consensus exists for use of hemoglobin A1c  for diagnosis of diabetes for children.  This Hemoglobin A1c assay has significant interference with fetal   hemoglobin   (HbF). The results are invalid for patients with abnormal amounts of   HbF,   including those with known  "Hereditary Persistence   of Fetal Hemoglobin. Heterozygous hemoglobin variants (HbAS, HbAC,   HbAD, HbAE, HbA2) do not significantly interfere with this assay;   however, presence of multiple variants in a sample may impact the %   interference.     04/25/2017 6.7 (H) 4.5 - 6.2 % Final     Comment:     According to ADA guidelines, hemoglobin A1C <7.0% represents  optimal control in non-pregnant diabetic patients.  Different  metrics may apply to specific populations.   Standards of Medical Care in Diabetes - 2016.  For the purpose of screening for the presence of diabetes:  <5.7%     Consistent with the absence of diabetes  5.7-6.4%  Consistent with increasing risk for diabetes   (prediabetes)  >or=6.5%  Consistent with diabetes  Currently no consensus exists for use of hemoglobin A1C  for diagnosis of diabetes for children.     03/16/2016 6.6 (H) 4.5 - 6.2 % Final           EXAM:   Vitals:    11/09/17 0929   BP: (!) 150/73   Pulse: 91   Weight: 61.7 kg (136 lb)   Height: 5' 5" (1.651 m)         General: alert, no distress    Vascular:   Dorsalis pedis:   2+ bilateral.   Posterior Tibial:   1+ bilateral.   3 seconds capillary refill time   Temperature of toes are warm to touch.   reduced hair growth on the feet.    Edema on feet:   none   Varicosities:  none    Dermatological:    Skin: thin,  Warm and dry. no hyperpigmentation, vitiligo, or suspicious lesions  Nails: toenails 1-5 L and 1-5 R  Are yellow thin and brittle.  left hallux toenail loose from nail bed.  Nail bed is dry and slightly hyperkeratotic   No wounds. No redness, no evidence of drainage.  Callus:  None  Open Wounds: None  Ecchymoses is not observed.      Erythema:  none .     Interdigital spaces: clean, dry and without evidence of break in skin integrity      Neurological:    normal pin prick sensation and normal vibratory sensation  Eldred normal      Musculoskeletal:     Muscle strength: 5/5, adequate ROM, adequate strength     Right foot:  no " gross deformity   Left foot: no gross deformity             ASSESSMENT/PLAN:      I counseled the patient on her conditions, their implications and medical management.       Loose toenail    Encounter for diabetic foot exam        Foot care education;  I trimmed the loose toenail as a courtesy.  Apply vaseline to nail bed daily.   Check feet daily monitor for worsening signs and symptoms.       Return in about 1 year (around 11/9/2018) for diabetic foot exam, or sooner if concerned.             Adela Pacheco DPM  NPI: [id number]

## 2017-11-09 NOTE — PATIENT INSTRUCTIONS
How to Check Your Feet    Below are tips to help you look for foot problems. Try to check your feet at the same time each day, such as when you get out of bed in the morning.    · Check the top of each foot. The tops of toes, back of the heel, and outer edge of the foot can get a lot of rubbing from poor-fitting shoes.    · Check the bottom of each foot. Daily wear and tear often leads to problems at pressure spots.    · Check the toes and nails. Fungal infections often occur between toes. Toenail problems can also be a sign of fungal infections or lead to breaks in the skin.    · Check your shoes, too. Loose objects inside a shoe can injure the foot. Use your hand to feel inside your shoes for things like sourav, loose stitching, or rough areas that could irritate your skin.        Diabetic Foot Care    Diabetes can lead to a number of different foot complications. Fortunately, most of these complications can be prevented with a little extra foot care. If diabetes is not well controlled, the high blood sugar can cause damage to blood vessels and result in poor circulation to the foot. When the skin does not get enough blood flow, it becomes prone to pressure sores and ulcers, which heal slowly.  High blood sugar can also damage nerves, interfering with the ability to feel pain and pressure. When you cant feel your foot normally, it is easy to injure your skin, bones and joints without knowing it. For these reasons diabetes increases the risk of fungal infections, bunions and ulcers. Deep ulcers can lead to bone infection. Gangrene is the most serious foot complication of diabetes. It usually occurs on the tips of the toes as blacked areas of skin. The black area is dead tissue. In severe cases, gangrene spreads to involve the entire toe, other toes and the entire foot. Foot or toe amputation may be required. Good foot care and blood sugar control can prevent this.    Home Care  1. Wear comfortable, proper fitting  shoes.  2. Wash your feet daily with warm water and mild soap.  3. After drying, apply a moisturizing cream or lotion.  4. Check your feet daily for skin breaks, blisters, swelling, or redness. Look between your toes also.  5. Wear cotton socks and change them every day.  6. Trim toe nails carefully and do not cut your cuticles.  7. Strive to keep your blood sugar under control with a combination of medicines, diet and activity.  8. If you smoke and have diabetes, it is very important that you stop. Smoking reduces blood flow to your foot.  9. Avoid activities that increase your risk of foot injury:  · Do not walk barefoot.  · Do not use heating pads or hot water bottles on your feet.  · Do not put your foot in a hot tub without first checking the temperature with your hand.  10) Schedule yearly foot exams.    Follow Up  with your doctor or as advised by our staff. Report any cut, puncture, scrape, other injury, blister, ingrown toenail or ulcer on your foot.    Get Prompt Medical Attention  if any of the following occur:  -- Open ulcer with pus draining from the wound  -- Increasing foot or leg pain  -- New areas of redness or swelling or tender areas of the foot    © 2900-8669 [employer]. [address] [place] [address], [place] [address]. All rights reserved. This information is not intended as a substitute for professional medical care. Always follow your healthcare professional's instructions.

## 2017-11-09 NOTE — PROGRESS NOTES
History of present illness:  82-year-old female in today for general health assessment and also following up on several chronic medical conditions including diabetes mellitus, hypertension dyslipidemia and others.  The patient also has history of asthmatic bronchitis and is having an acute flare in the last 2 weeks.  She is having some cough slight wheezing.  No fever no chills mildly productive sputum.  She is using her maintenance and as needed inhalers she has an aerosol nebulizer at home but does not use the present time    Current medication:  All medications noted and reviewed in the electronic medical record medication list    Review of systems:  General: no fever, chills, generalized body aches. No unexpected weight loss.  Eyes:  No visual disturbances.  HEENT:  No hoarseness, dysphagia, ear pain.  Cardiovascular: no chest pain, palpitations, cough, exertional limb pain.  GI: no nausea, vomiting.  No abdominal pain. No change in bowel habits.  No melena, no hematochezia.  : no dysuria. No change in the color or character of the urine. No urinary frequency.  Neurologic:  No focal neurological complaints.  No headaches.  Skin:  No rashes or other concerns.  Psych:  No emotional issues    Past medical history, past surgical history, family medical history and social histories are all noted reviewed and are electronic medical record history sections.    Health screenings:  Mammogram July 2017.  Bone densitometry May 2015.  She has had both pneumococcal vaccines.  Colonoscopy 2011.    Physical examination:  GENERAL:  Alert, appropriately groomed, no acute distress.  VS: Blood pressure taken manually by this examiner is 138/76.  EYES: sclerae white ,nonicteric. PERRL.  HEENT:  Normocephalic. Ear canals and tympanic membranes normal. Mouth and pharynx normal. No thyromegaly. Trachea midline and freely mobile.  LUNGS:  No rales there are faint end-expiratory wheezes at the bases.  No significant prolongation of  expiratory phase and no use of the accessory muscles of respiration.  CARDIOVASCULAR:  Normal heart sounds.  No significant murmur. Carotids full bilaterally without bruit.  Pedal pulses intact .  No abdominal bruit.  No peripheral extremity edema.  GI: the abdomen is soft, no distension. No masses , tenderness, organomegaly.     LYMPHATIC:  No axillary, inguinal , cervical adenopathy.  MUSCULOSKELETAL:  Range of motion, stability and strength of the right and left upper and lower extremities normal. No swollen or tender joints  NEUROLOGIC:  DTR's normal. No gross motor or sensory deficits apparent, gait normal.  SKIN:  No rashes.   MS:  Alert, oriented , affect and mood all appropriate  Diabetic foot exam:    Visual Inspection:  Normal -  Bilateral    Pedal Pulses:   Right: Present  Left: Present    Posterior tibialis:   Right:Present  Left: Present    Data:  Lab data noted and reviewed from November 2, 2017.  Glycohemoglobin 6.8.  Other labs noted and reviewed.  She does have mild microalbuminuria with microalbumin creatinine ratio 36.7.    Impression:  82-year-old lady with several chronic medical conditions.  Recent flare of asthmatic bronchitis.  Diabetes mellitus is well-controlled.  Hypertension appears to be reasonably controlled.  Dyslipidemia on statin therapy.  Osteoporosis on bisphosphonate therapy she reports for 5-6 years.    Plan:  Levaquin 500 milligrams daily for 7 days.  She is instructed to use her home nebulizer the next for 5 days until she continues to make significant improvements.  Add Mucinex twice a day.  Update bone densitometry.  We also discussed potential hiatus from her bisphosphonate therapy if her bone densitometry shows significant improvement.  Influenza vaccine today.  Return to clinic 6 months.

## 2017-11-13 ENCOUNTER — APPOINTMENT (OUTPATIENT)
Dept: RADIOLOGY | Facility: CLINIC | Age: 82
End: 2017-11-13
Attending: INTERNAL MEDICINE
Payer: MEDICARE

## 2017-11-13 DIAGNOSIS — Z78.0 POSTMENOPAUSAL STATUS: ICD-10-CM

## 2017-11-13 PROCEDURE — 77080 DXA BONE DENSITY AXIAL: CPT | Mod: 26,,, | Performed by: INTERNAL MEDICINE

## 2017-11-13 PROCEDURE — 77080 DXA BONE DENSITY AXIAL: CPT | Mod: TC

## 2017-11-30 ENCOUNTER — TELEPHONE (OUTPATIENT)
Dept: INTERNAL MEDICINE | Facility: CLINIC | Age: 82
End: 2017-11-30

## 2017-11-30 DIAGNOSIS — M81.0 SENILE OSTEOPOROSIS: Primary | ICD-10-CM

## 2017-11-30 NOTE — TELEPHONE ENCOUNTER
Do not stop alendronate at this time.  Because her bone density has declined she will probably need a change , but I would like for her to see one of our osteoporosis specialists (Jaquan)  For an evaluation and rec's in this regard.  Ref entered

## 2017-11-30 NOTE — TELEPHONE ENCOUNTER
----- Message from Ria Cary sent at 11/30/2017 12:44 PM CST -----  Contact: pt [phone]  Pt would like a call from the nurse in regards to should the pt  stop taking a medication (alendronate (FOSAMAX) 70 MG tablet) from the results of her bone density test,please advise pt

## 2017-12-15 ENCOUNTER — TELEPHONE (OUTPATIENT)
Dept: INTERNAL MEDICINE | Facility: CLINIC | Age: 82
End: 2017-12-15

## 2017-12-15 RX ORDER — PREDNISONE 20 MG/1
TABLET ORAL
Qty: 8 TABLET | Refills: 0 | Status: ON HOLD | OUTPATIENT
Start: 2017-12-15 | End: 2017-12-30

## 2017-12-15 NOTE — TELEPHONE ENCOUNTER
Spoke with pt who advises that she has been experiencing congestion and has a hx of asthma.    She is audibly wheezing.    She is hoping for a rx for prednisone.    Pt is unable to come in for an appt as her daughter just passed away and they are preparing for the , which is scheduled for Tuesday.    Please advise.    Thanks.

## 2017-12-15 NOTE — TELEPHONE ENCOUNTER
Spoke with pt to advise.    Verbalized understanding and referral generated to Dorinda, to contact the pt to schedule with PATRICE.

## 2017-12-15 NOTE — TELEPHONE ENCOUNTER
Noted that a small course of steroids have been sent to the pt's pharmacy.    Spoke with pt to advise.    Verbalized understanding.

## 2017-12-27 ENCOUNTER — HOSPITAL ENCOUNTER (INPATIENT)
Facility: HOSPITAL | Age: 82
LOS: 3 days | Discharge: HOME | DRG: 871 | End: 2017-12-30
Attending: EMERGENCY MEDICINE | Admitting: INTERNAL MEDICINE
Payer: MEDICARE

## 2017-12-27 ENCOUNTER — NURSE TRIAGE (OUTPATIENT)
Dept: ADMINISTRATIVE | Facility: CLINIC | Age: 82
End: 2017-12-27

## 2017-12-27 DIAGNOSIS — R06.02 SHORTNESS OF BREATH: ICD-10-CM

## 2017-12-27 DIAGNOSIS — J45.901 EXACERBATION OF ASTHMA, UNSPECIFIED ASTHMA SEVERITY, UNSPECIFIED WHETHER PERSISTENT: ICD-10-CM

## 2017-12-27 DIAGNOSIS — E11.9 TYPE 2 DIABETES MELLITUS WITHOUT COMPLICATION, WITHOUT LONG-TERM CURRENT USE OF INSULIN: ICD-10-CM

## 2017-12-27 DIAGNOSIS — J45.901 ASTHMA WITH ACUTE EXACERBATION, UNSPECIFIED ASTHMA SEVERITY, UNSPECIFIED WHETHER PERSISTENT: ICD-10-CM

## 2017-12-27 DIAGNOSIS — J96.01 ACUTE RESPIRATORY FAILURE WITH HYPOXIA: ICD-10-CM

## 2017-12-27 DIAGNOSIS — J18.9 PNEUMONIA OF LEFT LOWER LOBE DUE TO INFECTIOUS ORGANISM: Primary | ICD-10-CM

## 2017-12-27 DIAGNOSIS — A41.9 SEPSIS, DUE TO UNSPECIFIED ORGANISM: ICD-10-CM

## 2017-12-27 DIAGNOSIS — I10 HYPERTENSION, UNSPECIFIED TYPE: ICD-10-CM

## 2017-12-27 DIAGNOSIS — J18.9 PNEUMONIA OF BOTH LUNGS DUE TO INFECTIOUS ORGANISM, UNSPECIFIED PART OF LUNG: ICD-10-CM

## 2017-12-27 LAB
ALBUMIN SERPL BCP-MCNC: 3.2 G/DL
ALLENS TEST: ABNORMAL
ALP SERPL-CCNC: 49 U/L
ALT SERPL W/O P-5'-P-CCNC: 16 U/L
ANION GAP SERPL CALC-SCNC: 14 MMOL/L
ANISOCYTOSIS BLD QL SMEAR: SLIGHT
AST SERPL-CCNC: 18 U/L
BACTERIA #/AREA URNS AUTO: NORMAL /HPF
BASOPHILS # BLD AUTO: 0.11 K/UL
BASOPHILS NFR BLD: 0.3 %
BILIRUB SERPL-MCNC: 1.3 MG/DL
BILIRUB UR QL STRIP: NEGATIVE
BNP SERPL-MCNC: 278 PG/ML
BUN SERPL-MCNC: 23 MG/DL
CALCIUM SERPL-MCNC: 10.1 MG/DL
CHLORIDE SERPL-SCNC: 103 MMOL/L
CLARITY UR REFRACT.AUTO: CLEAR
CO2 SERPL-SCNC: 24 MMOL/L
COLOR UR AUTO: YELLOW
CREAT SERPL-MCNC: 1.3 MG/DL
DIFFERENTIAL METHOD: ABNORMAL
EOSINOPHIL # BLD AUTO: 0 K/UL
EOSINOPHIL NFR BLD: 0 %
ERYTHROCYTE [DISTWIDTH] IN BLOOD BY AUTOMATED COUNT: 14.5 %
EST. GFR  (AFRICAN AMERICAN): 44.5 ML/MIN/1.73 M^2
EST. GFR  (NON AFRICAN AMERICAN): 38.6 ML/MIN/1.73 M^2
ESTIMATED AVG GLUCOSE: 154 MG/DL
GLUCOSE SERPL-MCNC: 274 MG/DL
GLUCOSE UR QL STRIP: ABNORMAL
HBA1C MFR BLD HPLC: 7 %
HCO3 UR-SCNC: 26.7 MMOL/L (ref 24–28)
HCT VFR BLD AUTO: 37.8 %
HGB BLD-MCNC: 12.4 G/DL
HGB UR QL STRIP: NEGATIVE
HYALINE CASTS UR QL AUTO: 1 /LPF
HYPOCHROMIA BLD QL SMEAR: ABNORMAL
IMM GRANULOCYTES # BLD AUTO: 0.4 K/UL
IMM GRANULOCYTES NFR BLD AUTO: 1.1 %
KETONES UR QL STRIP: ABNORMAL
LACTATE SERPL-SCNC: 2.5 MMOL/L
LDH SERPL L TO P-CCNC: 2.25 MMOL/L (ref 0.5–2.2)
LEUKOCYTE ESTERASE UR QL STRIP: NEGATIVE
LIPASE SERPL-CCNC: 40 U/L
LYMPHOCYTES # BLD AUTO: 1.8 K/UL
LYMPHOCYTES NFR BLD: 5.1 %
MCH RBC QN AUTO: 29.9 PG
MCHC RBC AUTO-ENTMCNC: 32.8 G/DL
MCV RBC AUTO: 91 FL
MICROSCOPIC COMMENT: NORMAL
MONOCYTES # BLD AUTO: 1.8 K/UL
MONOCYTES NFR BLD: 5 %
NEUTROPHILS # BLD AUTO: 31.4 K/UL
NEUTROPHILS NFR BLD: 88.5 %
NITRITE UR QL STRIP: NEGATIVE
NRBC BLD-RTO: 0 /100 WBC
OVALOCYTES BLD QL SMEAR: ABNORMAL
PCO2 BLDA: 45.9 MMHG (ref 35–45)
PH SMN: 7.37 [PH] (ref 7.35–7.45)
PH UR STRIP: 5 [PH] (ref 5–8)
PLATELET # BLD AUTO: 289 K/UL
PLATELET BLD QL SMEAR: ABNORMAL
PMV BLD AUTO: 12.3 FL
PO2 BLDA: 25 MMHG (ref 40–60)
POC BE: 1 MMOL/L
POC SATURATED O2: 42 % (ref 95–100)
POC TCO2: 28 MMOL/L (ref 24–29)
POCT GLUCOSE: 322 MG/DL (ref 70–110)
POCT GLUCOSE: 341 MG/DL (ref 70–110)
POCT GLUCOSE: 368 MG/DL (ref 70–110)
POIKILOCYTOSIS BLD QL SMEAR: SLIGHT
POLYCHROMASIA BLD QL SMEAR: ABNORMAL
POTASSIUM SERPL-SCNC: 3.9 MMOL/L
PROCALCITONIN SERPL IA-MCNC: 3.26 NG/ML
PROT SERPL-MCNC: 7.2 G/DL
PROT UR QL STRIP: NEGATIVE
RBC # BLD AUTO: 4.15 M/UL
RBC #/AREA URNS AUTO: 0 /HPF (ref 0–4)
SAMPLE: ABNORMAL
SITE: ABNORMAL
SODIUM SERPL-SCNC: 141 MMOL/L
SP GR UR STRIP: >=1.03 (ref 1–1.03)
SQUAMOUS #/AREA URNS AUTO: 0 /HPF
TROPONIN I SERPL DL<=0.01 NG/ML-MCNC: 0.02 NG/ML
TROPONIN I SERPL DL<=0.01 NG/ML-MCNC: 0.06 NG/ML
URN SPEC COLLECT METH UR: ABNORMAL
UROBILINOGEN UR STRIP-ACNC: NEGATIVE EU/DL
WBC # BLD AUTO: 35.42 K/UL
WBC #/AREA URNS AUTO: 1 /HPF (ref 0–5)
YEAST UR QL AUTO: NORMAL

## 2017-12-27 PROCEDURE — 85025 COMPLETE CBC W/AUTO DIFF WBC: CPT

## 2017-12-27 PROCEDURE — 83036 HEMOGLOBIN GLYCOSYLATED A1C: CPT

## 2017-12-27 PROCEDURE — 25000003 PHARM REV CODE 250: Performed by: EMERGENCY MEDICINE

## 2017-12-27 PROCEDURE — 94640 AIRWAY INHALATION TREATMENT: CPT

## 2017-12-27 PROCEDURE — 81001 URINALYSIS AUTO W/SCOPE: CPT

## 2017-12-27 PROCEDURE — 99285 EMERGENCY DEPT VISIT HI MDM: CPT | Mod: 25

## 2017-12-27 PROCEDURE — 83690 ASSAY OF LIPASE: CPT

## 2017-12-27 PROCEDURE — 93005 ELECTROCARDIOGRAM TRACING: CPT

## 2017-12-27 PROCEDURE — 25500020 PHARM REV CODE 255: Performed by: EMERGENCY MEDICINE

## 2017-12-27 PROCEDURE — 83880 ASSAY OF NATRIURETIC PEPTIDE: CPT

## 2017-12-27 PROCEDURE — 63600175 PHARM REV CODE 636 W HCPCS: Performed by: EMERGENCY MEDICINE

## 2017-12-27 PROCEDURE — 27000221 HC OXYGEN, UP TO 24 HOURS

## 2017-12-27 PROCEDURE — 96372 THER/PROPH/DIAG INJ SC/IM: CPT

## 2017-12-27 PROCEDURE — 83605 ASSAY OF LACTIC ACID: CPT

## 2017-12-27 PROCEDURE — 25000003 PHARM REV CODE 250: Performed by: INTERNAL MEDICINE

## 2017-12-27 PROCEDURE — 25000242 PHARM REV CODE 250 ALT 637 W/ HCPCS: Performed by: EMERGENCY MEDICINE

## 2017-12-27 PROCEDURE — 99223 1ST HOSP IP/OBS HIGH 75: CPT | Mod: AI,,, | Performed by: INTERNAL MEDICINE

## 2017-12-27 PROCEDURE — 96361 HYDRATE IV INFUSION ADD-ON: CPT

## 2017-12-27 PROCEDURE — 99285 EMERGENCY DEPT VISIT HI MDM: CPT | Mod: ,,, | Performed by: EMERGENCY MEDICINE

## 2017-12-27 PROCEDURE — 80053 COMPREHEN METABOLIC PANEL: CPT

## 2017-12-27 PROCEDURE — 96374 THER/PROPH/DIAG INJ IV PUSH: CPT

## 2017-12-27 PROCEDURE — 84484 ASSAY OF TROPONIN QUANT: CPT

## 2017-12-27 PROCEDURE — 11000001 HC ACUTE MED/SURG PRIVATE ROOM

## 2017-12-27 PROCEDURE — 96375 TX/PRO/DX INJ NEW DRUG ADDON: CPT

## 2017-12-27 PROCEDURE — 87040 BLOOD CULTURE FOR BACTERIA: CPT

## 2017-12-27 PROCEDURE — 84145 PROCALCITONIN (PCT): CPT

## 2017-12-27 PROCEDURE — 82962 GLUCOSE BLOOD TEST: CPT

## 2017-12-27 PROCEDURE — 63600175 PHARM REV CODE 636 W HCPCS: Performed by: INTERNAL MEDICINE

## 2017-12-27 PROCEDURE — 93010 ELECTROCARDIOGRAM REPORT: CPT | Mod: ,,, | Performed by: INTERNAL MEDICINE

## 2017-12-27 RX ORDER — AMLODIPINE BESYLATE 5 MG/1
5 TABLET ORAL DAILY
Status: DISCONTINUED | OUTPATIENT
Start: 2017-12-28 | End: 2017-12-30 | Stop reason: HOSPADM

## 2017-12-27 RX ORDER — RAMELTEON 8 MG/1
8 TABLET ORAL NIGHTLY PRN
Status: DISCONTINUED | OUTPATIENT
Start: 2017-12-27 | End: 2017-12-30 | Stop reason: HOSPADM

## 2017-12-27 RX ORDER — CEFTRIAXONE 1 G/1
1 INJECTION, POWDER, FOR SOLUTION INTRAMUSCULAR; INTRAVENOUS
Status: COMPLETED | OUTPATIENT
Start: 2017-12-27 | End: 2017-12-27

## 2017-12-27 RX ORDER — METHYLPREDNISOLONE SOD SUCC 125 MG
125 VIAL (EA) INJECTION ONCE
Status: COMPLETED | OUTPATIENT
Start: 2017-12-27 | End: 2017-12-27

## 2017-12-27 RX ORDER — INSULIN ASPART 100 [IU]/ML
2 INJECTION, SOLUTION INTRAVENOUS; SUBCUTANEOUS
Status: DISCONTINUED | OUTPATIENT
Start: 2017-12-27 | End: 2017-12-28

## 2017-12-27 RX ORDER — HYDROCODONE BITARTRATE AND ACETAMINOPHEN 5; 325 MG/1; MG/1
1 TABLET ORAL EVERY 8 HOURS PRN
Status: DISCONTINUED | OUTPATIENT
Start: 2017-12-27 | End: 2017-12-30 | Stop reason: HOSPADM

## 2017-12-27 RX ORDER — AZITHROMYCIN 250 MG/1
500 TABLET, FILM COATED ORAL DAILY
Status: COMPLETED | OUTPATIENT
Start: 2017-12-28 | End: 2017-12-30

## 2017-12-27 RX ORDER — CHOLECALCIFEROL (VITAMIN D3) 25 MCG
1000 TABLET ORAL DAILY
Status: DISCONTINUED | OUTPATIENT
Start: 2017-12-27 | End: 2017-12-30 | Stop reason: HOSPADM

## 2017-12-27 RX ORDER — OLOPATADINE HYDROCHLORIDE 2 MG/ML
1 SOLUTION/ DROPS OPHTHALMIC DAILY
Status: DISCONTINUED | OUTPATIENT
Start: 2017-12-27 | End: 2017-12-30 | Stop reason: HOSPADM

## 2017-12-27 RX ORDER — AZITHROMYCIN 250 MG/1
500 TABLET, FILM COATED ORAL
Status: COMPLETED | OUTPATIENT
Start: 2017-12-27 | End: 2017-12-27

## 2017-12-27 RX ORDER — LISINOPRIL AND HYDROCHLOROTHIAZIDE 10; 12.5 MG/1; MG/1
2 TABLET ORAL DAILY
Status: DISCONTINUED | OUTPATIENT
Start: 2017-12-28 | End: 2017-12-30

## 2017-12-27 RX ORDER — ALBUTEROL SULFATE 0.83 MG/ML
2.5 SOLUTION RESPIRATORY (INHALATION) EVERY 4 HOURS PRN
Status: DISCONTINUED | OUTPATIENT
Start: 2017-12-27 | End: 2017-12-30 | Stop reason: HOSPADM

## 2017-12-27 RX ORDER — INSULIN ASPART 100 [IU]/ML
0-5 INJECTION, SOLUTION INTRAVENOUS; SUBCUTANEOUS
Status: DISCONTINUED | OUTPATIENT
Start: 2017-12-27 | End: 2017-12-30 | Stop reason: HOSPADM

## 2017-12-27 RX ORDER — AMOXICILLIN 250 MG
1 CAPSULE ORAL 2 TIMES DAILY
Status: DISCONTINUED | OUTPATIENT
Start: 2017-12-27 | End: 2017-12-30 | Stop reason: HOSPADM

## 2017-12-27 RX ORDER — IPRATROPIUM BROMIDE AND ALBUTEROL SULFATE 2.5; .5 MG/3ML; MG/3ML
3 SOLUTION RESPIRATORY (INHALATION)
Status: COMPLETED | OUTPATIENT
Start: 2017-12-27 | End: 2017-12-27

## 2017-12-27 RX ORDER — IBUPROFEN 200 MG
16 TABLET ORAL
Status: DISCONTINUED | OUTPATIENT
Start: 2017-12-27 | End: 2017-12-30 | Stop reason: HOSPADM

## 2017-12-27 RX ORDER — ONDANSETRON 8 MG/1
8 TABLET, ORALLY DISINTEGRATING ORAL EVERY 8 HOURS PRN
Status: DISCONTINUED | OUTPATIENT
Start: 2017-12-27 | End: 2017-12-30 | Stop reason: HOSPADM

## 2017-12-27 RX ORDER — PREDNISONE 20 MG/1
40 TABLET ORAL DAILY
Status: DISCONTINUED | OUTPATIENT
Start: 2017-12-27 | End: 2017-12-30 | Stop reason: HOSPADM

## 2017-12-27 RX ORDER — IBUPROFEN 200 MG
24 TABLET ORAL
Status: DISCONTINUED | OUTPATIENT
Start: 2017-12-27 | End: 2017-12-30 | Stop reason: HOSPADM

## 2017-12-27 RX ORDER — CEFTRIAXONE 1 G/50ML
1 INJECTION, SOLUTION INTRAVENOUS
Status: DISCONTINUED | OUTPATIENT
Start: 2017-12-28 | End: 2017-12-30

## 2017-12-27 RX ORDER — ACETAMINOPHEN 500 MG
500 TABLET ORAL EVERY 6 HOURS PRN
Status: DISCONTINUED | OUTPATIENT
Start: 2017-12-27 | End: 2017-12-30 | Stop reason: HOSPADM

## 2017-12-27 RX ORDER — ATORVASTATIN CALCIUM 20 MG/1
20 TABLET, FILM COATED ORAL DAILY
Status: DISCONTINUED | OUTPATIENT
Start: 2017-12-28 | End: 2017-12-30 | Stop reason: HOSPADM

## 2017-12-27 RX ORDER — SODIUM CHLORIDE 0.9 % (FLUSH) 0.9 %
5 SYRINGE (ML) INJECTION
Status: DISCONTINUED | OUTPATIENT
Start: 2017-12-27 | End: 2017-12-30 | Stop reason: HOSPADM

## 2017-12-27 RX ORDER — ASCORBIC ACID 250 MG
250 TABLET ORAL DAILY
Status: DISCONTINUED | OUTPATIENT
Start: 2017-12-27 | End: 2017-12-30 | Stop reason: HOSPADM

## 2017-12-27 RX ORDER — ONDANSETRON 2 MG/ML
4 INJECTION INTRAMUSCULAR; INTRAVENOUS
Status: COMPLETED | OUTPATIENT
Start: 2017-12-27 | End: 2017-12-27

## 2017-12-27 RX ORDER — ENOXAPARIN SODIUM 100 MG/ML
40 INJECTION SUBCUTANEOUS EVERY 24 HOURS
Status: DISCONTINUED | OUTPATIENT
Start: 2017-12-27 | End: 2017-12-30 | Stop reason: HOSPADM

## 2017-12-27 RX ORDER — ONDANSETRON 2 MG/ML
4 INJECTION INTRAMUSCULAR; INTRAVENOUS EVERY 12 HOURS PRN
Status: DISCONTINUED | OUTPATIENT
Start: 2017-12-27 | End: 2017-12-30 | Stop reason: HOSPADM

## 2017-12-27 RX ORDER — FLUTICASONE FUROATE AND VILANTEROL 200; 25 UG/1; UG/1
1 POWDER RESPIRATORY (INHALATION) DAILY
Status: DISCONTINUED | OUTPATIENT
Start: 2017-12-27 | End: 2017-12-30 | Stop reason: HOSPADM

## 2017-12-27 RX ORDER — LATANOPROST 50 UG/ML
1 SOLUTION/ DROPS OPHTHALMIC NIGHTLY
Status: DISCONTINUED | OUTPATIENT
Start: 2017-12-27 | End: 2017-12-30 | Stop reason: HOSPADM

## 2017-12-27 RX ORDER — GLUCAGON 1 MG
1 KIT INJECTION
Status: DISCONTINUED | OUTPATIENT
Start: 2017-12-27 | End: 2017-12-30 | Stop reason: HOSPADM

## 2017-12-27 RX ORDER — OLOPATADINE HYDROCHLORIDE 1 MG/ML
1 SOLUTION/ DROPS OPHTHALMIC 2 TIMES DAILY
Status: DISCONTINUED | OUTPATIENT
Start: 2017-12-27 | End: 2017-12-27

## 2017-12-27 RX ADMIN — SODIUM CHLORIDE 1000 ML: 0.9 INJECTION, SOLUTION INTRAVENOUS at 12:12

## 2017-12-27 RX ADMIN — SODIUM CHLORIDE 1000 ML: 0.9 INJECTION, SOLUTION INTRAVENOUS at 09:12

## 2017-12-27 RX ADMIN — IPRATROPIUM BROMIDE AND ALBUTEROL SULFATE 3 ML: .5; 3 SOLUTION RESPIRATORY (INHALATION) at 10:12

## 2017-12-27 RX ADMIN — INSULIN ASPART 2 UNITS: 100 INJECTION, SOLUTION INTRAVENOUS; SUBCUTANEOUS at 06:12

## 2017-12-27 RX ADMIN — ONDANSETRON 4 MG: 2 INJECTION INTRAMUSCULAR; INTRAVENOUS at 09:12

## 2017-12-27 RX ADMIN — CEFTRIAXONE SODIUM 1 G: 1 INJECTION, POWDER, FOR SOLUTION INTRAMUSCULAR; INTRAVENOUS at 11:12

## 2017-12-27 RX ADMIN — ENOXAPARIN SODIUM 40 MG: 100 INJECTION SUBCUTANEOUS at 06:12

## 2017-12-27 RX ADMIN — PREDNISONE 40 MG: 20 TABLET ORAL at 03:12

## 2017-12-27 RX ADMIN — METHYLPREDNISOLONE SODIUM SUCCINATE 125 MG: 125 INJECTION, POWDER, FOR SOLUTION INTRAMUSCULAR; INTRAVENOUS at 10:12

## 2017-12-27 RX ADMIN — STANDARDIZED SENNA CONCENTRATE AND DOCUSATE SODIUM 1 TABLET: 8.6; 5 TABLET, FILM COATED ORAL at 09:12

## 2017-12-27 RX ADMIN — Medication 250 MG: at 03:12

## 2017-12-27 RX ADMIN — AZITHROMYCIN 500 MG: 250 TABLET, FILM COATED ORAL at 11:12

## 2017-12-27 RX ADMIN — IOHEXOL 75 ML: 350 INJECTION, SOLUTION INTRAVENOUS at 11:12

## 2017-12-27 RX ADMIN — INSULIN DETEMIR 4 UNITS: 100 INJECTION, SOLUTION SUBCUTANEOUS at 09:12

## 2017-12-27 RX ADMIN — VITAMIN D, TAB 1000IU (100/BT) 1000 UNITS: 25 TAB at 03:12

## 2017-12-27 NOTE — TELEPHONE ENCOUNTER
Reason for Disposition   MODERATE difficulty breathing (e.g., speaks in phrases, SOB even at rest, pulse 100-120) of new onset or worse than normal    Protocols used: ST BREATHING DIFFICULTY-A-OH    Mrs. Costa is experiencing difficulty breathing and she vomited last night.

## 2017-12-27 NOTE — ED NOTES
Patient identifiers verified and correct for Sussy Costa.    LOC: The patient is awake, alert and aware of environment with an appropriate affect, the patient is oriented x 3 and speaking appropriately.  APPEARANCE: Patient resting comfortably and in no acute distress, patient is clean and well groomed, patient's clothing is properly fastened.  SKIN: The skin is warm and dry, color consistent with ethnicity, patient has normal skin turgor and moist mucus membranes, skin intact, no breakdown or bruising noted.  MUSCULOSKELETAL: Patient moving all extremities spontaneously, no obvious swelling or deformities noted.  RESPIRATORY: Airway is open and patent, respirations are spontaneous, patient is tachypneic, no accessory muscle use noted, audible wheezing noted.  CARDIAC: Patient is tachycardic and has a regular rhythm, no peripheral edema noted, capillary refill < 3 seconds.  ABDOMEN: Soft and tender to palpation in LUQ and epigastric region, no distention noted.  NEUROLOGIC: Eyes open spontaneously, behavior appropriate to situation, follows commands, facial expression symmetrical, bilateral hand grasp equal and even, purposeful motor response noted, normal sensation in all extremities when touched with a finger.

## 2017-12-27 NOTE — ED PROVIDER NOTES
Encounter Date: 12/27/2017    SCRIBE #1 NOTE: I, Casandra Lopez, am scribing for, and in the presence of,  Dr. Hoyt. I have scribed the following portions of the note - the EKG reading and the Resident attestation. Other sections scribed: CXR, Attending notes.       History     Chief Complaint   Patient presents with    Abdominal Pain     since yesterday    Nausea    Vomiting     82-year-old female with a past medical history significant for asthma, htn,  and type 2 diabetes presents for the evaluation of shortness of breath.  Patient reports shortness of breath is been worsening for the past 4 days.  It is been resistant to her home nebulizer treatments and home inhalers.  Patient has not had to come to the ER for an asthma exacerbation in over 5 years.  She also endorses a productive cough of green/yellow sputum.  She denies any fever and reports that her temperature yesterday was a maximum of 99.5.  Last night the patient suffered 2 episodes of nonbloody nonbilious emesis.  She is also reporting some epigastric abdominal pain which is aching in nature.  The abdominal pain has been constant since last night.  Last time patient used steroids for an asthma exacerbation was 2 weeks ago.          Review of patient's allergies indicates:   Allergen Reactions    Aspirin Other (See Comments)     Asthma    TRIAD OF NASAL POLYPS, ASA  ALLERGY AND ASTHMA.        Aspirin Other (See Comments)    Nsaids (non-steroidal anti-inflammatory drug) Other (See Comments)     AVOID DUE TO TRIAD OF ASA ALLERGY, NASAL POLYPS,AND ASTHMA    Penicillin g Other (See Comments)     Past Medical History:   Diagnosis Date    Asthma     Cyst of pancreas     liver    Diabetes mellitus type II     Eczema of hand     Glaucoma     Hyperlipidemia     Hypertension     Lichen planus     gums    Osteoporosis     Pulmonary nodules      Past Surgical History:   Procedure Laterality Date    CATARACT EXTRACTION, BILATERAL       CHOLECYSTECTOMY      HYSTERECTOMY      nasal polyps       Family History   Problem Relation Age of Onset    Heart disease Father     Heart disease Brother     Hypertension Brother      Social History   Substance Use Topics    Smoking status: Never Smoker    Smokeless tobacco: Never Used    Alcohol use Yes      Comment: socially     Review of Systems   Constitutional: Positive for fatigue. Negative for chills, diaphoresis and fever.   HENT: Negative for sore throat.    Eyes: Negative for visual disturbance.   Respiratory: Positive for cough, chest tightness and shortness of breath.    Cardiovascular: Negative for chest pain, palpitations and leg swelling.   Gastrointestinal: Positive for abdominal pain, nausea and vomiting. Negative for abdominal distention, anal bleeding, blood in stool, constipation and diarrhea.   Genitourinary: Negative for dysuria and hematuria.   Musculoskeletal: Negative for back pain.   Skin: Negative for rash.   Neurological: Negative for weakness and headaches.   Hematological: Does not bruise/bleed easily.       Physical Exam     Initial Vitals [12/27/17 0847]   BP Pulse Resp Temp SpO2   118/84 107 20 98 °F (36.7 °C) (!) 91 %      MAP       95.33         Physical Exam    Nursing note and vitals reviewed.  Constitutional: She appears well-developed and well-nourished. She is not diaphoretic. No distress.   Audibly wheezing but othewise well appearing    HENT:   Head: Normocephalic and atraumatic.   Right Ear: External ear normal.   Eyes: Right eye exhibits no discharge. Left eye exhibits no discharge.   Neck: Normal range of motion. No thyromegaly present. No tracheal deviation present.   Cardiovascular: Regular rhythm.   Slightly tachycardic   Pulmonary/Chest: She has wheezes. She has no rhonchi.   Expiratory wheezes with coarse lung sounds L>R   Abdominal: Soft. She exhibits no distension and no mass. There is tenderness (epigastrum, no peritoneal signs ). There is no rebound and  no guarding.   Musculoskeletal: She exhibits no edema or tenderness.   No leg edema    Neurological: She is alert and oriented to person, place, and time. She has normal strength.   Skin: Skin is warm and dry. No erythema. No pallor.   Psychiatric: She has a normal mood and affect. Her behavior is normal. Judgment and thought content normal.         ED Course   Procedures  Labs Reviewed   CBC W/ AUTO DIFFERENTIAL - Abnormal; Notable for the following:        Result Value    WBC 35.42 (*)     Immature Granulocytes 1.1 (*)     Gran # 31.4 (*)     Immature Grans (Abs) 0.40 (*)     Mono # 1.8 (*)     Gran% 88.5 (*)     Lymph% 5.1 (*)     All other components within normal limits    Narrative:     Add on order [id number] Trop. Per   10:08  10:08    COMPREHENSIVE METABOLIC PANEL - Abnormal; Notable for the following:     Glucose 274 (*)     Albumin 3.2 (*)     Total Bilirubin 1.3 (*)     Alkaline Phosphatase 49 (*)     eGFR if  44.5 (*)     eGFR if non  38.6 (*)     All other components within normal limits   URINALYSIS - Abnormal; Notable for the following:     Specific Gravity, UA >=1.030 (*)     Glucose, UA 3+ (*)     Ketones, UA Trace (*)     All other components within normal limits    Narrative:     extra urine   TROPONIN I - Abnormal; Notable for the following:     Troponin I 0.061 (*)     All other components within normal limits    Narrative:     Add on order [id number] Trop. Per   10:08  10:08    LACTIC ACID, PLASMA - Abnormal; Notable for the following:     Lactate (Lactic Acid) 2.5 (*)     All other components within normal limits   B-TYPE NATRIURETIC PEPTIDE - Abnormal; Notable for the following:      (*)     All other components within normal limits    Narrative:     Add on order [id number] Trop, [id number] BNP, [id number] PCAL. Per     10:08  10:08    PROCALCITONIN - Abnormal; Notable for the following:     Procalcitonin 3.26 (*)     All other  components within normal limits    Narrative:     Add on order [id number] Trop, [id number] BNP, [id number] PCAL. Per     10:08  10:08    HEMOGLOBIN A1C - Abnormal; Notable for the following:     Hemoglobin A1C 7.0 (*)     Estimated Avg Glucose 154 (*)     All other components within normal limits    Narrative:     Add on order [id number] Trop, [id number] BNP, [id number] PCAL. Per     10:08  10:08   ADD-ON [facility] #[id number] PER LUIS GARCIA MD 15:11  12/27/2017    ISTAT PROCEDURE - Abnormal; Notable for the following:     POC PCO2 45.9 (*)     POC PO2 25 (*)     POC SATURATED O2 42 (*)     POC Lactate 2.25 (*)     All other components within normal limits   POCT GLUCOSE - Abnormal; Notable for the following:     POCT Glucose 322 (*)     All other components within normal limits   CULTURE, BLOOD   CULTURE, BLOOD   CULTURE, RESPIRATORY   LIPASE   TROPONIN I   B-TYPE NATRIURETIC PEPTIDE   PROCALCITONIN   TROPONIN I   URINALYSIS MICROSCOPIC    Narrative:     extra urine   HEMOGLOBIN A1C   POCT GLUCOSE MONITORING CONTINUOUS     EKG Readings: (Independently Interpreted)   Initial Reading: No STEMI. Rhythm: Normal Sinus Rhythm. Heart Rate: 99 bpm.       X-Rays:   Independently Interpreted Readings:   Chest X-Ray: Left lower lobe PNA.     Medical Decision Making:   History:   Old Medical Records: I decided to obtain old medical records.  Initial Assessment:   82-year-old female presents for evaluation of worsening shortness of breath, abdominal pain, and nausea with emesis.  No previous abdominal surgeries  Differential Diagnosis:   Asthma exacerbation,  pneumonia, viral URI, bacterial infection, glycemic tissue, ACS PUD, intra-abdominal infection,: Gallbladder etiology, pancreatitis,  Independently Interpreted Test(s):   I have ordered and independently interpreted X-rays - see prior notes.  I have ordered and independently interpreted EKG Reading(s) - see prior notes  Clinical Tests:   Lab Tests: Ordered and  Reviewed  Radiological Study: Ordered and Reviewed  Medical Tests: Ordered and Reviewed  ED Management:  We'll initiate broad workup including labs for ACS, EKG, chest x-ray.  We'll hold on abdominal imaging at this time as the abdominal exam is fairly benign.  Will give IV fluids, DuoNeb treatment, and steroids.  Final disposition pending full workup. No asa given due to asa allergy       APC / Resident Notes:   .HO-III Update:  Patient with a white blood cell count of 34K.  We will add a lactate, Propulsid, and abdominal CT series.    Darrell Levy MD, PGY- 3  10:35 AM    HO-III Update:  Chest x-ray with a bilateral pneumonia.  CT of the abdomen shows no acute intra-abdominal processes.  Want cultures drawn.  Lactate 2.5, IV fluids initiated.  procalcitonin elevated  Rocephin and azithromycin started.  Patient wheezing improved afr ter DuoNeb treatment.  Patient to be admitted as an inpatient for IV antibiotics.    Darrell Levy MD, PGY- 3  1:41 PM         Scribe Attestation:   Scribe #1: I performed the above scribed service and the documentation accurately describes the services I performed. I attest to the accuracy of the note.    Attending Attestation:   Physician Attestation Statement for Resident:  As the supervising MD   Physician Attestation Statement: I have personally seen and examined this patient.   I agree with the above history. -: 82 y.o. female who presents complaining of significant SOB. This has been going on for the last 4 days, using a nebulizer did not help. She does have a hx of asthma.   As the supervising MD I agree with the above PE.    As the supervising MD I agree with the above treatment, course, plan, and disposition.   -: Will give breathing treatment, do CXR, and rule out cardiac component to this.  I have reviewed and agree with the residents interpretation of the following: lab data, x-rays and EKG.  I have reviewed the following: old records at this facility.            Attending ED  Notes:   10:53 PM Labs reviewed. Patient has 21087 white count with a shift to the left but no bands. Patient also has slightly elevated troponin of .061. Will trend this result out.     Patient presenting here with significant SOB with wheezing and cough. Nebulizer treatments have not been helping this patient. Patient was evaluated in the ED and found to have left lower lobe PNA. She was started on IV antibiotics and oral antibiotics. Patient is admitted to internal medicine service.          ED Course      Clinical Impression:   The primary encounter diagnosis was Pneumonia of both lungs due to infectious organism, unspecified part of lung. Diagnoses of Exacerbation of asthma, unspecified asthma severity, unspecified whether persistent and Shortness of breath were also pertinent to this visit.    Disposition:   Disposition: Admitted  Condition: Serious                        Anthony Hoyt MD  01/11/18 1303

## 2017-12-27 NOTE — ED TRIAGE NOTES
Pt presents to the ED c/o SOB. Denies chest pain. +cough with brown sputum. Endorses left rib pain. Pt also c/o abdominal pain since yesterday. +n/v. Denies diarrhea.

## 2017-12-28 LAB
ALBUMIN SERPL BCP-MCNC: 2.4 G/DL
ANION GAP SERPL CALC-SCNC: 9 MMOL/L
BASOPHILS # BLD AUTO: 0.03 K/UL
BASOPHILS NFR BLD: 0.1 %
BUN SERPL-MCNC: 23 MG/DL
CALCIUM SERPL-MCNC: 8.9 MG/DL
CHLORIDE SERPL-SCNC: 109 MMOL/L
CO2 SERPL-SCNC: 24 MMOL/L
CREAT SERPL-MCNC: 1 MG/DL
DIFFERENTIAL METHOD: ABNORMAL
EOSINOPHIL # BLD AUTO: 0 K/UL
EOSINOPHIL NFR BLD: 0 %
ERYTHROCYTE [DISTWIDTH] IN BLOOD BY AUTOMATED COUNT: 14.6 %
EST. GFR  (AFRICAN AMERICAN): >60 ML/MIN/1.73 M^2
EST. GFR  (NON AFRICAN AMERICAN): 53 ML/MIN/1.73 M^2
GLUCOSE SERPL-MCNC: 263 MG/DL
HCT VFR BLD AUTO: 30 %
HGB BLD-MCNC: 9.7 G/DL
IMM GRANULOCYTES # BLD AUTO: 0.35 K/UL
IMM GRANULOCYTES NFR BLD AUTO: 1.4 %
LYMPHOCYTES # BLD AUTO: 1.1 K/UL
LYMPHOCYTES NFR BLD: 4.3 %
MAGNESIUM SERPL-MCNC: 1.6 MG/DL
MCH RBC QN AUTO: 29.7 PG
MCHC RBC AUTO-ENTMCNC: 32.3 G/DL
MCV RBC AUTO: 92 FL
MONOCYTES # BLD AUTO: 1 K/UL
MONOCYTES NFR BLD: 3.8 %
NEUTROPHILS # BLD AUTO: 23.3 K/UL
NEUTROPHILS NFR BLD: 90.4 %
NRBC BLD-RTO: 0 /100 WBC
PHOSPHATE SERPL-MCNC: 2.2 MG/DL
PLATELET # BLD AUTO: 251 K/UL
PMV BLD AUTO: 12.1 FL
POCT GLUCOSE: 231 MG/DL (ref 70–110)
POCT GLUCOSE: 250 MG/DL (ref 70–110)
POCT GLUCOSE: 289 MG/DL (ref 70–110)
POTASSIUM SERPL-SCNC: 3.6 MMOL/L
RBC # BLD AUTO: 3.27 M/UL
SODIUM SERPL-SCNC: 142 MMOL/L
WBC # BLD AUTO: 25.73 K/UL

## 2017-12-28 PROCEDURE — 63600175 PHARM REV CODE 636 W HCPCS: Performed by: INTERNAL MEDICINE

## 2017-12-28 PROCEDURE — 94664 DEMO&/EVAL PT USE INHALER: CPT

## 2017-12-28 PROCEDURE — 83735 ASSAY OF MAGNESIUM: CPT

## 2017-12-28 PROCEDURE — 25000242 PHARM REV CODE 250 ALT 637 W/ HCPCS: Performed by: INTERNAL MEDICINE

## 2017-12-28 PROCEDURE — 11000001 HC ACUTE MED/SURG PRIVATE ROOM

## 2017-12-28 PROCEDURE — 99232 SBSQ HOSP IP/OBS MODERATE 35: CPT | Mod: ,,, | Performed by: INTERNAL MEDICINE

## 2017-12-28 PROCEDURE — 85025 COMPLETE CBC W/AUTO DIFF WBC: CPT

## 2017-12-28 PROCEDURE — 25000003 PHARM REV CODE 250: Performed by: INTERNAL MEDICINE

## 2017-12-28 PROCEDURE — 36415 COLL VENOUS BLD VENIPUNCTURE: CPT

## 2017-12-28 PROCEDURE — 80069 RENAL FUNCTION PANEL: CPT

## 2017-12-28 RX ORDER — INSULIN ASPART 100 [IU]/ML
4 INJECTION, SOLUTION INTRAVENOUS; SUBCUTANEOUS
Status: DISCONTINUED | OUTPATIENT
Start: 2017-12-29 | End: 2017-12-29

## 2017-12-28 RX ADMIN — AZITHROMYCIN 500 MG: 250 TABLET, FILM COATED ORAL at 09:12

## 2017-12-28 RX ADMIN — VITAMIN D, TAB 1000IU (100/BT) 1000 UNITS: 25 TAB at 09:12

## 2017-12-28 RX ADMIN — CEFTRIAXONE SODIUM 1 G: 1 INJECTION, POWDER, FOR SOLUTION INTRAMUSCULAR; INTRAVENOUS at 09:12

## 2017-12-28 RX ADMIN — INSULIN DETEMIR 4 UNITS: 100 INJECTION, SOLUTION SUBCUTANEOUS at 09:12

## 2017-12-28 RX ADMIN — INSULIN ASPART 2 UNITS: 100 INJECTION, SOLUTION INTRAVENOUS; SUBCUTANEOUS at 09:12

## 2017-12-28 RX ADMIN — Medication 250 MG: at 09:12

## 2017-12-28 RX ADMIN — ENOXAPARIN SODIUM 40 MG: 100 INJECTION SUBCUTANEOUS at 04:12

## 2017-12-28 RX ADMIN — OLOPATADINE HYDROCHLORIDE 1 DROP: 2 SOLUTION/ DROPS OPHTHALMIC at 10:12

## 2017-12-28 RX ADMIN — INSULIN ASPART 2 UNITS: 100 INJECTION, SOLUTION INTRAVENOUS; SUBCUTANEOUS at 01:12

## 2017-12-28 RX ADMIN — INSULIN DETEMIR 7 UNITS: 100 INJECTION, SOLUTION SUBCUTANEOUS at 10:12

## 2017-12-28 RX ADMIN — PREDNISONE 40 MG: 20 TABLET ORAL at 09:12

## 2017-12-28 RX ADMIN — ATORVASTATIN CALCIUM 20 MG: 20 TABLET, FILM COATED ORAL at 09:12

## 2017-12-28 RX ADMIN — LISINOPRIL AND HYDROCHLOROTHIAZIDE 2 TABLET: 12.5; 1 TABLET ORAL at 09:12

## 2017-12-28 RX ADMIN — STANDARDIZED SENNA CONCENTRATE AND DOCUSATE SODIUM 1 TABLET: 8.6; 5 TABLET, FILM COATED ORAL at 09:12

## 2017-12-28 RX ADMIN — INSULIN ASPART 3 UNITS: 100 INJECTION, SOLUTION INTRAVENOUS; SUBCUTANEOUS at 01:12

## 2017-12-28 RX ADMIN — AMLODIPINE BESYLATE 5 MG: 5 TABLET ORAL at 09:12

## 2017-12-28 RX ADMIN — FLUTICASONE FUROATE AND VILANTEROL TRIFENATATE 1 PUFF: 200; 25 POWDER RESPIRATORY (INHALATION) at 01:12

## 2017-12-28 RX ADMIN — INSULIN ASPART 2 UNITS: 100 INJECTION, SOLUTION INTRAVENOUS; SUBCUTANEOUS at 06:12

## 2017-12-28 RX ADMIN — STANDARDIZED SENNA CONCENTRATE AND DOCUSATE SODIUM 1 TABLET: 8.6; 5 TABLET, FILM COATED ORAL at 10:12

## 2017-12-28 RX ADMIN — INSULIN ASPART 1 UNITS: 100 INJECTION, SOLUTION INTRAVENOUS; SUBCUTANEOUS at 10:12

## 2017-12-28 NOTE — SUBJECTIVE & OBJECTIVE
Past Medical History:   Diagnosis Date    Asthma     Cyst of pancreas     liver    Diabetes mellitus type II     Eczema of hand     Glaucoma     Hyperlipidemia     Hypertension     Lichen planus     gums    Osteoporosis     Pulmonary nodules        Past Surgical History:   Procedure Laterality Date    CATARACT EXTRACTION, BILATERAL      CHOLECYSTECTOMY      HYSTERECTOMY      nasal polyps         Review of patient's allergies indicates:   Allergen Reactions    Aspirin Other (See Comments)     Asthma    TRIAD OF NASAL POLYPS, ASA  ALLERGY AND ASTHMA.        Aspirin Other (See Comments)    Nsaids (non-steroidal anti-inflammatory drug) Other (See Comments)     AVOID DUE TO TRIAD OF ASA ALLERGY, NASAL POLYPS,AND ASTHMA    Penicillin g Other (See Comments)       No current facility-administered medications on file prior to encounter.      Current Outpatient Prescriptions on File Prior to Encounter   Medication Sig    acetaminophen (TYLENOL EXTRA STRENGTH) 500 MG tablet Take by mouth.    alendronate (FOSAMAX) 70 MG tablet TAKE ONE TABLET BY MOUTH EVERY 7 DAYS    amlodipine (NORVASC) 5 MG tablet TAKE ONE TABLET BY MOUTH ONE TIME DAILY     ascorbic acid (VITAMIN C) 100 MG tablet Take 100 mg by mouth once daily.    atorvastatin (LIPITOR) 20 MG tablet TAKE ONE TABLET BY MOUTH IN THE EVENING FOR CHOLESTEROL    blood sugar diagnostic (CONTOUR TEST STRIPS) Strp 1 strip by MISCELLANEOUS route 2 (two) times daily.    blood sugar diagnostic Strp 1 strip by Misc.(Non-Drug; Combo Route) route once daily.    blood-glucose meter kit Use as instructed    calcium carbonate 220 mg capsule Take 250 mg by mouth 2 (two) times daily with meals.      cod liver oil Oil Take by mouth.    fluocinonide (LIDEX) 0.05 % gel Apply 1 application topically 2 (two) times daily.      fluocinonide 0.05% (LIDEX) 0.05 % cream     fluticasone-salmeterol 500-50 mcg/dose (ADVAIR) 500-50 mcg/dose DsDv diskus inhaler Inhale 1 puff  into the lungs 2 (two) times daily.    glimepiride (AMARYL) 2 MG tablet TAKE ONE TABLET BY MOUTH IN THE MORNING WITH BREAKFAST    lancets (MICROLET LANCET) Misc Check blood sugar 2 times daily    latanoprost (XALATAN) 0.005 % ophthalmic solution Inject into the eye. 1 Drops Ophthalmic Every evening    levalbuterol (XOPENEX) 0.63 mg/3 mL nebulizer solution Take 3 mLs (0.63 mg total) by nebulization 3 (three) times daily as needed for Wheezing.    levoFLOXacin (LEVAQUIN) 500 MG tablet Take 1 tablet (500 mg total) by mouth once daily.    lisinopril-hydrochlorothiazide (PRINZIDE,ZESTORETIC) 10-12.5 mg per tablet TAKE TWO TABLETS BY MOUTH DAILY     meclizine (ANTIVERT) 12.5 mg tablet Take 1 tablet (12.5 mg total) by mouth 2 (two) times daily as needed.    metformin (GLUCOPHAGE-XR) 500 MG 24 hr tablet Take 1 tablet (500 mg total) by mouth daily with breakfast.    montelukast (SINGULAIR) 10 mg tablet TAKE ONE TABLET BY MOUTH NIGHTLY    NEBULIZER ACCESSORIES (NEBULIZER MISC)     neomycin-polymyxin-dexamethasone (MAXITROL) 3.5mg/mL-10,000 unit/mL-0.1 % DrpS     olopatadine (PATANOL) 0.1 % ophthalmic solution Inject into the eye. 1 Drops Ophthalmic Twice a day     OMNARIS 50 mcg Spry 2 sprays by Each Nare route once daily.    predniSONE (DELTASONE) 20 MG tablet Two po qd x 4 d    SODIUM CHLORIDE/SODIUM BICARB (NEILMED SINUS RINSE COMPLETE NASL) by Nasal route 2 (two) times daily. Uses in distilled water.    triamcinolone acetonide 0.1% (KENALOG) 0.1 % cream Apply topically 2 (two) times daily.    VENTOLIN HFA 90 mcg/actuation inhaler INHALE 2 PUFFS INTO THE LUNGS EVERY 6 HOURS AS NEEDED    vitamin D 185 MG Tab Take 185 mg by mouth once daily.       Family History     Problem Relation (Age of Onset)    Heart disease Father, Brother    Hypertension Brother        Social History Main Topics    Smoking status: Never Smoker    Smokeless tobacco: Never Used    Alcohol use Yes      Comment: socially    Drug  use: No    Sexual activity: Yes     Partners: Male     Birth control/ protection: Post-menopausal     Review of Systems   Constitutional: Positive for fatigue.   HENT: Negative.    Eyes: Negative.    Respiratory: Positive for cough and shortness of breath.    Cardiovascular: Positive for leg swelling.   Gastrointestinal: Positive for abdominal pain, nausea and vomiting.   Musculoskeletal: Negative.    Skin: Negative.    Allergic/Immunologic: Negative for immunocompromised state.   Neurological: Negative.      Objective:     Vital Signs (Most Recent):  Temp: 98 °F (36.7 °C) (12/27/17 0847)  Pulse: 82 (12/27/17 1543)  Resp: (!) 21 (12/27/17 1136)  BP: (!) 103/52 (12/27/17 1540)  SpO2: 100 % (12/27/17 1543) Vital Signs (24h Range):  Temp:  [98 °F (36.7 °C)] 98 °F (36.7 °C)  Pulse:  [] 82  Resp:  [16-56] 21  SpO2:  [90 %-100 %] 100 %  BP: ()/(50-84) 103/52     Weight: 63.2 kg (139 lb 5.3 oz)  Body mass index is 23.19 kg/m².    Physical Exam   Constitutional: She appears well-developed.  Non-toxic appearance.   HENT:   Head: Normocephalic.   Mouth/Throat: Mucous membranes are not pale and not cyanotic.   Eyes: Conjunctivae and lids are normal. Pupils are equal.   Neck: Neck supple.   Cardiovascular: Normal rate, regular rhythm, S1 normal and S2 normal.    Pulmonary/Chest: Effort normal. She has wheezes. She has rales in the left lower field.   Abdominal: Soft. Bowel sounds are normal. There is no tenderness.   Musculoskeletal: She exhibits no edema.   Neurological: She is not disoriented.   Skin: Skin is warm and dry. No cyanosis. Nails show no clubbing.   Psychiatric: She has a normal mood and affect.         CRANIAL NERVES     CN III, IV, VI   Pupils: equal       Significant Labs:   A1C:   Recent Labs  Lab 11/02/17  0855 12/27/17  0921   HGBA1C 6.8* 7.0*     ABGs:   Recent Labs  Lab 12/27/17  1047   PH 7.372   PCO2 45.9*   HCO3 26.7   POCSATURATED 42*   BE 1     CBC:   Recent Labs  Lab 12/27/17  0921   WBC  35.42*   HGB 12.4   HCT 37.8        CMP:   Recent Labs  Lab 12/27/17  0921      K 3.9      CO2 24   *   BUN 23   CREATININE 1.3   CALCIUM 10.1   PROT 7.2   ALBUMIN 3.2*   BILITOT 1.3*   ALKPHOS 49*   AST 18   ALT 16   ANIONGAP 14   EGFRNONAA 38.6*     Cardiac Markers:   Recent Labs  Lab 12/27/17  0921   *     Lactic Acid:   Recent Labs  Lab 12/27/17  1041   LACTATE 2.5*     Lipase:   Recent Labs  Lab 12/27/17  0921   LIPASE 40     POCT Glucose:   Recent Labs  Lab 12/27/17  1529   POCTGLUCOSE 322*     Troponin:   Recent Labs  Lab 12/27/17  0921 12/27/17  1419   TROPONINI 0.061* 0.019     TSH:   Recent Labs  Lab 11/02/17  0855   TSH 2.275     Urine Studies:   Recent Labs  Lab 12/27/17  1355   COLORU Yellow   APPEARANCEUA Clear   PHUR 5.0   SPECGRAV >=1.030*   PROTEINUA Negative   GLUCUA 3+*   KETONESU Trace*   BILIRUBINUA Negative   OCCULTUA Negative   NITRITE Negative   UROBILINOGEN Negative   LEUKOCYTESUR Negative   RBCUA 0   WBCUA 1   BACTERIA Rare   SQUAMEPITHEL 0   HYALINECASTS 1       Significant Imaging: CXR: I have reviewed all pertinent results/findings within the past 24 hours and my personal findings are:  .  EKG: I have reviewed all pertinent results/findings within the past 24 hours and my personal findings are: .

## 2017-12-28 NOTE — PLAN OF CARE
Problem: Patient Care Overview  Goal: Plan of Care Review  Outcome: Ongoing (interventions implemented as appropriate)  Reviewed plan of care with patient, patient admitted this evening for pneumonia, incentive spirometry started, advised on the need for repositioning and adequate fluids and rest/activity balance, as well as provided education on prescribed antibiotic therapy. Patient verbalized understanding and agreement to plan of care, see flowsheets for detailed assessment information. Patient with no questions or concerns at this time, no acute events over night.

## 2017-12-28 NOTE — PLAN OF CARE
12/28/17 1655   Discharge Assessment   Assessment Type Discharge Planning Assessment   Confirmed/corrected address and phone number on facesheet? Yes   Assessment information obtained from? Medical Record   Expected Length of Stay (days) 3   Communicated expected length of stay with patient/caregiver yes   Prior to hospitilization cognitive status: Alert/Oriented   Prior to hospitalization functional status: Independent   Current cognitive status: Alert/Oriented   Current Functional Status: Independent   Lives With spouse   Able to Return to Prior Arrangements yes   Is patient able to care for self after discharge? Yes   Who are your caregiver(s) and their phone number(s)? self care   Patient's perception of discharge disposition home or selfcare   Readmission Within The Last 30 Days no previous admission in last 30 days   Patient currently being followed by outpatient case management? No   Patient currently receives any other outside agency services? No   Equipment Currently Used at Home none   Do you have any problems affording any of your prescribed medications? No   Is the patient taking medications as prescribed? yes   Does the patient have transportation home? Yes   Transportation Available family or friend will provide   Does the patient receive services at the [facility]? No   Discharge Plan A Home with family   Discharge Plan B Home Health   Patient/Family In Agreement With Plan yes

## 2017-12-28 NOTE — ASSESSMENT & PLAN NOTE
Non-insulin diabetes medications are held and the patient's hyperglycemia is managed with a SQ basal, prandial, correction dose insulin regimen.

## 2017-12-28 NOTE — HPI
82 y.o. female presented to the ER with past medical history of asthma, hypertension, DM-2, hyperlipidemia.  She was in her usual state of good health until the acute onset of shortness of breath beginning 4 days prior to admission with gradually worsening course. There has been no improvement with her home medications. Yesterday, she developed upper, left-sided, abdominal pain associated with nausea and vomiting. Pertinent associated symptoms include productive cough, green/yellow sputum. Denies fever. Patient required corticosteroids for an asthma exacerbation about 2 weeks ago.

## 2017-12-28 NOTE — H&P
[facility] Hospital Medicine  History & Physical    Patient Name: Sussy Costa  MRN: [id number]  Admission Date: 12/27/2017  Attending Physician: Alice Varela MD   Primary Care Provider: EZ Casillas MD    [facility] Medicine Team: [facility]OSP MED D Alice Varela MD     Patient information was obtained from patient, spouse/SO, past medical records and ER records.     Subjective:     Principal Problem:Pneumonia of left lower lobe due to infectious organism    Chief Complaint:   Chief Complaint   Patient presents with    Abdominal Pain     since yesterday    Nausea    Vomiting        HPI: 82 y.o. female presented to the ER with past medical history of asthma, hypertension, DM-2, hyperlipidemia.  She was in her usual state of good health until the acute onset of shortness of breath beginning 4 days prior to admission with gradually worsening course. There has been no improvement with her home medications. Yesterday, she developed upper, left-sided, abdominal pain associated with nausea and vomiting. Pertinent associated symptoms include productive cough, green/yellow sputum. Denies fever. Patient required corticosteroids for an asthma exacerbation about 2 weeks ago.    Past Medical History:   Diagnosis Date    Asthma     Cyst of pancreas     liver    Diabetes mellitus type II     Eczema of hand     Glaucoma     Hyperlipidemia     Hypertension     Lichen planus     gums    Osteoporosis     Pulmonary nodules        Past Surgical History:   Procedure Laterality Date    CATARACT EXTRACTION, BILATERAL      CHOLECYSTECTOMY      HYSTERECTOMY      nasal polyps         Review of patient's allergies indicates:   Allergen Reactions    Aspirin Other (See Comments)     Asthma    TRIAD OF NASAL POLYPS, ASA  ALLERGY AND ASTHMA.        Aspirin Other (See Comments)    Nsaids (non-steroidal anti-inflammatory drug) Other (See Comments)     AVOID DUE TO TRIAD OF ASA ALLERGY, NASAL  POLYPS,AND ASTHMA    Penicillin g Other (See Comments)       No current facility-administered medications on file prior to encounter.      Current Outpatient Prescriptions on File Prior to Encounter   Medication Sig    acetaminophen (TYLENOL EXTRA STRENGTH) 500 MG tablet Take by mouth.    alendronate (FOSAMAX) 70 MG tablet TAKE ONE TABLET BY MOUTH EVERY 7 DAYS    amlodipine (NORVASC) 5 MG tablet TAKE ONE TABLET BY MOUTH ONE TIME DAILY     ascorbic acid (VITAMIN C) 100 MG tablet Take 100 mg by mouth once daily.    atorvastatin (LIPITOR) 20 MG tablet TAKE ONE TABLET BY MOUTH IN THE EVENING FOR CHOLESTEROL    blood sugar diagnostic (CONTOUR TEST STRIPS) Strp 1 strip by MISCELLANEOUS route 2 (two) times daily.    blood sugar diagnostic Strp 1 strip by Misc.(Non-Drug; Combo Route) route once daily.    blood-glucose meter kit Use as instructed    calcium carbonate 220 mg capsule Take 250 mg by mouth 2 (two) times daily with meals.      cod liver oil Oil Take by mouth.    fluocinonide (LIDEX) 0.05 % gel Apply 1 application topically 2 (two) times daily.      fluocinonide 0.05% (LIDEX) 0.05 % cream     fluticasone-salmeterol 500-50 mcg/dose (ADVAIR) 500-50 mcg/dose DsDv diskus inhaler Inhale 1 puff into the lungs 2 (two) times daily.    glimepiride (AMARYL) 2 MG tablet TAKE ONE TABLET BY MOUTH IN THE MORNING WITH BREAKFAST    lancets (MICROLET LANCET) Misc Check blood sugar 2 times daily    latanoprost (XALATAN) 0.005 % ophthalmic solution Inject into the eye. 1 Drops Ophthalmic Every evening    levalbuterol (XOPENEX) 0.63 mg/3 mL nebulizer solution Take 3 mLs (0.63 mg total) by nebulization 3 (three) times daily as needed for Wheezing.    levoFLOXacin (LEVAQUIN) 500 MG tablet Take 1 tablet (500 mg total) by mouth once daily.    lisinopril-hydrochlorothiazide (PRINZIDE,ZESTORETIC) 10-12.5 mg per tablet TAKE TWO TABLETS BY MOUTH DAILY     meclizine (ANTIVERT) 12.5 mg tablet Take 1 tablet (12.5 mg total)  by mouth 2 (two) times daily as needed.    metformin (GLUCOPHAGE-XR) 500 MG 24 hr tablet Take 1 tablet (500 mg total) by mouth daily with breakfast.    montelukast (SINGULAIR) 10 mg tablet TAKE ONE TABLET BY MOUTH NIGHTLY    NEBULIZER ACCESSORIES (NEBULIZER MISC)     neomycin-polymyxin-dexamethasone (MAXITROL) 3.5mg/mL-10,000 unit/mL-0.1 % DrpS     olopatadine (PATANOL) 0.1 % ophthalmic solution Inject into the eye. 1 Drops Ophthalmic Twice a day     OMNARIS 50 mcg Spry 2 sprays by Each Nare route once daily.    predniSONE (DELTASONE) 20 MG tablet Two po qd x 4 d    SODIUM CHLORIDE/SODIUM BICARB (NEILMED SINUS RINSE COMPLETE NASL) by Nasal route 2 (two) times daily. Uses in distilled water.    triamcinolone acetonide 0.1% (KENALOG) 0.1 % cream Apply topically 2 (two) times daily.    VENTOLIN HFA 90 mcg/actuation inhaler INHALE 2 PUFFS INTO THE LUNGS EVERY 6 HOURS AS NEEDED    vitamin D 185 MG Tab Take 185 mg by mouth once daily.       Family History     Problem Relation (Age of Onset)    Heart disease Father, Brother    Hypertension Brother        Social History Main Topics    Smoking status: Never Smoker    Smokeless tobacco: Never Used    Alcohol use Yes      Comment: socially    Drug use: No    Sexual activity: Yes     Partners: Male     Birth control/ protection: Post-menopausal     Review of Systems   Constitutional: Positive for fatigue.   HENT: Negative.    Eyes: Negative.    Respiratory: Positive for cough and shortness of breath.    Cardiovascular: Positive for leg swelling.   Gastrointestinal: Positive for abdominal pain, nausea and vomiting.   Musculoskeletal: Negative.    Skin: Negative.    Allergic/Immunologic: Negative for immunocompromised state.   Neurological: Negative.      Objective:     Vital Signs (Most Recent):  Temp: 98 °F (36.7 °C) (12/27/17 0847)  Pulse: 82 (12/27/17 1543)  Resp: (!) 21 (12/27/17 1136)  BP: (!) 103/52 (12/27/17 1540)  SpO2: 100 % (12/27/17 1543) Vital Signs  (24h Range):  Temp:  [98 °F (36.7 °C)] 98 °F (36.7 °C)  Pulse:  [] 82  Resp:  [16-56] 21  SpO2:  [90 %-100 %] 100 %  BP: ()/(50-84) 103/52     Weight: 63.2 kg (139 lb 5.3 oz)  Body mass index is 23.19 kg/m².    Physical Exam   Constitutional: She appears well-developed.  Non-toxic appearance.   HENT:   Head: Normocephalic.   Mouth/Throat: Mucous membranes are not pale and not cyanotic.   Eyes: Conjunctivae and lids are normal. Pupils are equal.   Neck: Neck supple.   Cardiovascular: Normal rate, regular rhythm, S1 normal and S2 normal.    Pulmonary/Chest: Effort normal. She has wheezes. She has rales in the left lower field.   Abdominal: Soft. Bowel sounds are normal. There is no tenderness.   Musculoskeletal: She exhibits no edema.   Neurological: She is not disoriented.   Skin: Skin is warm and dry. No cyanosis. Nails show no clubbing.   Psychiatric: She has a normal mood and affect.         CRANIAL NERVES     CN III, IV, VI   Pupils: equal       Significant Labs:   A1C:   Recent Labs  Lab 11/02/17  0855 12/27/17  0921   HGBA1C 6.8* 7.0*     ABGs:   Recent Labs  Lab 12/27/17  1047   PH 7.372   PCO2 45.9*   HCO3 26.7   POCSATURATED 42*   BE 1     CBC:   Recent Labs  Lab 12/27/17  0921   WBC 35.42*   HGB 12.4   HCT 37.8        CMP:   Recent Labs  Lab 12/27/17  0921      K 3.9      CO2 24   *   BUN 23   CREATININE 1.3   CALCIUM 10.1   PROT 7.2   ALBUMIN 3.2*   BILITOT 1.3*   ALKPHOS 49*   AST 18   ALT 16   ANIONGAP 14   EGFRNONAA 38.6*     Cardiac Markers:   Recent Labs  Lab 12/27/17  0921   *     Lactic Acid:   Recent Labs  Lab 12/27/17  1041   LACTATE 2.5*     Lipase:   Recent Labs  Lab 12/27/17  0921   LIPASE 40     POCT Glucose:   Recent Labs  Lab 12/27/17  1529   POCTGLUCOSE 322*     Troponin:   Recent Labs  Lab 12/27/17  0921 12/27/17  1419   TROPONINI 0.061* 0.019     TSH:   Recent Labs  Lab 11/02/17  0855   TSH 2.275     Urine Studies:   Recent Labs  Lab  12/27/17  1355   COLORU Yellow   APPEARANCEUA Clear   PHUR 5.0   SPECGRAV >=1.030*   PROTEINUA Negative   GLUCUA 3+*   KETONESU Trace*   BILIRUBINUA Negative   OCCULTUA Negative   NITRITE Negative   UROBILINOGEN Negative   LEUKOCYTESUR Negative   RBCUA 0   WBCUA 1   BACTERIA Rare   SQUAMEPITHEL 0   HYALINECASTS 1       Significant Imaging: CXR: I have reviewed all pertinent results/findings within the past 24 hours and my personal findings are:  LLL infiltrate.  EKG: I have reviewed all pertinent results/findings within the past 24 hours and my personal findings are: NSR, non-ischemic.    Assessment/Plan:     Current Hospital Problem List:    Active Hospital Problems    Diagnosis  POA    *Pneumonia of left lower lobe due to infectious organism [J18.1]  Yes     Priority: 1 - High    Sepsis [A41.9]  Yes     Priority: 2     Asthma with exacerbation [J45.901]  Yes     Priority: 3     HTN (hypertension) [I10]  Yes    Diabetes mellitus, type II [E11.9]  Yes      Resolved Hospital Problems    Diagnosis Date Resolved POA   No resolved problems to display.       Ordered Medications for management of current problems:    [START ON 12/28/2017] amLODIPine  5 mg Oral Daily    ascorbic acid (vitamin C)  250 mg Oral Daily    [START ON 12/28/2017] atorvastatin  20 mg Oral Daily    [START ON 12/28/2017] azithromycin  500 mg Oral Daily    [START ON 12/28/2017] cefTRIAXone (ROCEPHIN) IVPB  1 g Intravenous Q24H    enoxaparin  40 mg Subcutaneous Daily    fluticasone-vilanterol  1 puff Inhalation Daily    insulin aspart  2 Units Subcutaneous TIDWM    insulin detemir  4 Units Subcutaneous BID    latanoprost  1 drop Both Eyes QHS    [START ON 12/28/2017] lisinopril-hydrochlorothiazide  2 tablet Oral Daily    olopatadine  1 drop Both Eyes BID    predniSONE  40 mg Oral Daily    senna-docusate 8.6-50 mg  1 tablet Oral BID    vitamin D  1,000 Units Oral Daily       Risk  Patient has a condition that poses threat to life and  bodily function: Respiratory Distress    Anticipated Disposition: Home or Self Care    Assessment and Plan by Problem:    * Pneumonia of left lower lobe due to infectious organism    Treating with ceftriaxone and azithromycin.        Sepsis    Leukocytosis, tachypnea, tachycardia.        Asthma with exacerbation    Treating with nebs, prednisone, and continuing home medications. Held Singulair.        Diabetes mellitus, type II    Non-insulin diabetes medications are held and the patient's hyperglycemia is managed with a SQ basal, prandial, correction dose insulin regimen.        HTN (hypertension)    Continuing home medications.          VTE Risk Mitigation         Ordered     enoxaparin injection 40 mg  Daily     Route:  Subcutaneous        12/27/17 1509     Medium Risk of VTE  Once      12/27/17 1509             Alice Varela MD  Department of Hospital Medicine   [facility]

## 2017-12-28 NOTE — PROGRESS NOTES
Patient complaining of no shortness of breath.  Patient's oxygenation 95% at 3.5L nasal cannula.  Family at bedside.  Will continue to monitor.

## 2017-12-29 PROBLEM — J96.01 ACUTE RESPIRATORY FAILURE WITH HYPOXIA: Status: ACTIVE | Noted: 2017-12-29

## 2017-12-29 LAB
ALBUMIN SERPL BCP-MCNC: 2.3 G/DL
ANION GAP SERPL CALC-SCNC: 8 MMOL/L
BASOPHILS # BLD AUTO: 0.02 K/UL
BASOPHILS NFR BLD: 0.1 %
BUN SERPL-MCNC: 35 MG/DL
CALCIUM SERPL-MCNC: 8.5 MG/DL
CHLORIDE SERPL-SCNC: 109 MMOL/L
CO2 SERPL-SCNC: 23 MMOL/L
CREAT SERPL-MCNC: 0.8 MG/DL
DIFFERENTIAL METHOD: ABNORMAL
EOSINOPHIL # BLD AUTO: 0 K/UL
EOSINOPHIL NFR BLD: 0 %
ERYTHROCYTE [DISTWIDTH] IN BLOOD BY AUTOMATED COUNT: 14.9 %
EST. GFR  (AFRICAN AMERICAN): >60 ML/MIN/1.73 M^2
EST. GFR  (NON AFRICAN AMERICAN): >60 ML/MIN/1.73 M^2
GLUCOSE SERPL-MCNC: 202 MG/DL
HCT VFR BLD AUTO: 28.6 %
HGB BLD-MCNC: 9.3 G/DL
IMM GRANULOCYTES # BLD AUTO: 0.24 K/UL
IMM GRANULOCYTES NFR BLD AUTO: 1.1 %
LYMPHOCYTES # BLD AUTO: 1.4 K/UL
LYMPHOCYTES NFR BLD: 6.2 %
MAGNESIUM SERPL-MCNC: 2.1 MG/DL
MCH RBC QN AUTO: 29.5 PG
MCHC RBC AUTO-ENTMCNC: 32.5 G/DL
MCV RBC AUTO: 91 FL
MONOCYTES # BLD AUTO: 1 K/UL
MONOCYTES NFR BLD: 4.7 %
NEUTROPHILS # BLD AUTO: 19.3 K/UL
NEUTROPHILS NFR BLD: 87.9 %
NRBC BLD-RTO: 0 /100 WBC
PHOSPHATE SERPL-MCNC: 1.9 MG/DL
PLATELET # BLD AUTO: 237 K/UL
PMV BLD AUTO: 12.1 FL
POCT GLUCOSE: 167 MG/DL (ref 70–110)
POCT GLUCOSE: 204 MG/DL (ref 70–110)
POCT GLUCOSE: 295 MG/DL (ref 70–110)
POCT GLUCOSE: 301 MG/DL (ref 70–110)
POCT GLUCOSE: 333 MG/DL (ref 70–110)
POTASSIUM SERPL-SCNC: 3.5 MMOL/L
RBC # BLD AUTO: 3.15 M/UL
SODIUM SERPL-SCNC: 140 MMOL/L
WBC # BLD AUTO: 21.93 K/UL

## 2017-12-29 PROCEDURE — 97161 PT EVAL LOW COMPLEX 20 MIN: CPT

## 2017-12-29 PROCEDURE — 25000003 PHARM REV CODE 250: Performed by: INTERNAL MEDICINE

## 2017-12-29 PROCEDURE — 25000242 PHARM REV CODE 250 ALT 637 W/ HCPCS: Performed by: INTERNAL MEDICINE

## 2017-12-29 PROCEDURE — 36415 COLL VENOUS BLD VENIPUNCTURE: CPT

## 2017-12-29 PROCEDURE — 80069 RENAL FUNCTION PANEL: CPT

## 2017-12-29 PROCEDURE — 99232 SBSQ HOSP IP/OBS MODERATE 35: CPT | Mod: ,,, | Performed by: INTERNAL MEDICINE

## 2017-12-29 PROCEDURE — 27000221 HC OXYGEN, UP TO 24 HOURS

## 2017-12-29 PROCEDURE — 63600175 PHARM REV CODE 636 W HCPCS: Performed by: INTERNAL MEDICINE

## 2017-12-29 PROCEDURE — 97165 OT EVAL LOW COMPLEX 30 MIN: CPT

## 2017-12-29 PROCEDURE — 94664 DEMO&/EVAL PT USE INHALER: CPT

## 2017-12-29 PROCEDURE — 85025 COMPLETE CBC W/AUTO DIFF WBC: CPT

## 2017-12-29 PROCEDURE — 11000001 HC ACUTE MED/SURG PRIVATE ROOM

## 2017-12-29 PROCEDURE — 99900035 HC TECH TIME PER 15 MIN (STAT)

## 2017-12-29 PROCEDURE — 83735 ASSAY OF MAGNESIUM: CPT

## 2017-12-29 RX ORDER — INSULIN ASPART 100 [IU]/ML
6 INJECTION, SOLUTION INTRAVENOUS; SUBCUTANEOUS
Status: DISCONTINUED | OUTPATIENT
Start: 2017-12-30 | End: 2017-12-30 | Stop reason: HOSPADM

## 2017-12-29 RX ADMIN — VITAMIN D, TAB 1000IU (100/BT) 1000 UNITS: 25 TAB at 09:12

## 2017-12-29 RX ADMIN — INSULIN ASPART 4 UNITS: 100 INJECTION, SOLUTION INTRAVENOUS; SUBCUTANEOUS at 05:12

## 2017-12-29 RX ADMIN — PREDNISONE 40 MG: 20 TABLET ORAL at 09:12

## 2017-12-29 RX ADMIN — INSULIN ASPART 4 UNITS: 100 INJECTION, SOLUTION INTRAVENOUS; SUBCUTANEOUS at 09:12

## 2017-12-29 RX ADMIN — DIBASIC SODIUM PHOSPHATE, MONOBASIC POTASSIUM PHOSPHATE AND MONOBASIC SODIUM PHOSPHATE 2 TABLET: 852; 155; 130 TABLET ORAL at 05:12

## 2017-12-29 RX ADMIN — INSULIN ASPART 2 UNITS: 100 INJECTION, SOLUTION INTRAVENOUS; SUBCUTANEOUS at 02:12

## 2017-12-29 RX ADMIN — CEFTRIAXONE SODIUM 1 G: 1 INJECTION, POWDER, FOR SOLUTION INTRAMUSCULAR; INTRAVENOUS at 09:12

## 2017-12-29 RX ADMIN — AZITHROMYCIN 500 MG: 250 TABLET, FILM COATED ORAL at 09:12

## 2017-12-29 RX ADMIN — LATANOPROST 1 DROP: 50 SOLUTION OPHTHALMIC at 08:12

## 2017-12-29 RX ADMIN — INSULIN DETEMIR 7 UNITS: 100 INJECTION, SOLUTION SUBCUTANEOUS at 08:12

## 2017-12-29 RX ADMIN — INSULIN ASPART 4 UNITS: 100 INJECTION, SOLUTION INTRAVENOUS; SUBCUTANEOUS at 02:12

## 2017-12-29 RX ADMIN — ENOXAPARIN SODIUM 40 MG: 100 INJECTION SUBCUTANEOUS at 05:12

## 2017-12-29 RX ADMIN — DIBASIC SODIUM PHOSPHATE, MONOBASIC POTASSIUM PHOSPHATE AND MONOBASIC SODIUM PHOSPHATE 2 TABLET: 852; 155; 130 TABLET ORAL at 02:12

## 2017-12-29 RX ADMIN — Medication 250 MG: at 09:12

## 2017-12-29 RX ADMIN — DIBASIC SODIUM PHOSPHATE, MONOBASIC POTASSIUM PHOSPHATE AND MONOBASIC SODIUM PHOSPHATE 2 TABLET: 852; 155; 130 TABLET ORAL at 09:12

## 2017-12-29 RX ADMIN — STANDARDIZED SENNA CONCENTRATE AND DOCUSATE SODIUM 1 TABLET: 8.6; 5 TABLET, FILM COATED ORAL at 09:12

## 2017-12-29 RX ADMIN — FLUTICASONE FUROATE AND VILANTEROL TRIFENATATE 1 PUFF: 200; 25 POWDER RESPIRATORY (INHALATION) at 09:12

## 2017-12-29 RX ADMIN — INSULIN DETEMIR 7 UNITS: 100 INJECTION, SOLUTION SUBCUTANEOUS at 09:12

## 2017-12-29 RX ADMIN — ATORVASTATIN CALCIUM 20 MG: 20 TABLET, FILM COATED ORAL at 09:12

## 2017-12-29 RX ADMIN — AMLODIPINE BESYLATE 5 MG: 5 TABLET ORAL at 09:12

## 2017-12-29 RX ADMIN — LISINOPRIL AND HYDROCHLOROTHIAZIDE 2 TABLET: 12.5; 1 TABLET ORAL at 09:12

## 2017-12-29 RX ADMIN — LATANOPROST 1 DROP: 50 SOLUTION OPHTHALMIC at 02:12

## 2017-12-29 RX ADMIN — INSULIN ASPART 2 UNITS: 100 INJECTION, SOLUTION INTRAVENOUS; SUBCUTANEOUS at 08:12

## 2017-12-29 NOTE — PT/OT/SLP EVAL
"Occupational Therapy   Evaluation and Discharge Note    Name: Sussy Costa  MRN: [id number]  Admitting Diagnosis:  Pneumonia of left lower lobe due to infectious organism      Recommendations:     Discharge Recommendations: home  Discharge Equipment Recommendations:  none  Barriers to discharge:  None    History:     Occupational Profile:  Living Environment: Pt reported that she lives with her  in [facility] with threshold to enter. Home has tub/shower combo.   Previous level of function: Indep with ADLs/self care. Still drives short distances. Does house chores with rest breaks as needed.   Pt reported that her daughter passed away last week (cancer)  Roles and Routines: wife, care taker to self, home and community dweller,   Equipment Owned:  none  Assistance upon Discharge: yes, spouse    Past Medical History:   Diagnosis Date    Asthma     Cyst of pancreas     liver    Diabetes mellitus type II     Eczema of hand     Glaucoma     Hyperlipidemia     Hypertension     Lichen planus     gums    Osteoporosis     Pulmonary nodules        Past Surgical History:   Procedure Laterality Date    CATARACT EXTRACTION, BILATERAL      CHOLECYSTECTOMY      HYSTERECTOMY      nasal polyps         Subjective     Chief Complaint: "My cough is getting much better"  Patient/Family stated goals: home  Communicated with: RN prior to session. Completed with PTHumberto. Pt agreeable to therapy session.  Pain/Comfort:  · Pain Rating 1: 0/10  · Pain Rating Post-Intervention 1: 0/10    Objective:     Patient found with: oxygen (3L via NC)    General Precautions: Standard, fall, diabetic   Orthopedic Precautions:N/A   Braces: N/A     Occupational Performance:    Bed Mobility:    · Not completed; pt sitting up in chair upon arrival    Functional Mobility/Transfers:  · Patient completed Sit <> Stand Transfer with modified independence  with  no assistive device   · Patient completed Bed <> Chair Transfer using Stand " Pivot technique with modified independence with no assistive device   · Functional mobility: Mod(I); decreased klaudia     Activities of Daily Living:  · Grooming: modified independence while standing at sink for hand hygiene  · UB Dressing: supervision while standing to don gown as jacket    Cognitive/Visual Perceptual:  Cognitive/Psychosocial Skills:     -       Oriented to: Person, Place, Time and Situation   -       Follows Commands/attention:Follows multistep  commands  -       Communication: clear/fluent  -       Memory: No Deficits noted  -       Safety awareness/insight to disability: intact   -       Mood/Affect/Coping skills/emotional control: Appropriate to situation  Visual/Perceptual:      -Intact    Physical Exam:  Balance:    -       no LOB for standing task  Postural examination/scapula alignment:    -       Rounded shoulders  Dominant hand:    -       R  Upper Extremity Range of Motion:     -       Right Upper Extremity: WFL  -       Left Upper Extremity: WFL  Upper Extremity Strength:    -       Right Upper Extremity: WFL  -       Left Upper Extremity: WFL   Strength:    -       Right Upper Extremity: WFL  -       Left Upper Extremity: WFL    Patient left up in chair with all lines intact, call button in reach and RN notified    [facility] 6 Click:  [facility] Total Score: 23    Treatment & Education:  -Pt alert and agreeable to therapy session  -Edu on importance of activity and OOB within means  -Communication board updated; no family present for education   Education:    Assessment:     Sussy Costa is a 82 y.o. female with a medical diagnosis of Pneumonia of left lower lobe due to infectious organism. She demo an overall decline in her endurance at this time. Moreover, she has a recent death of her adult daughter which she is also in the process of coping with. She demo good motivation and participation for activity at this time and demo understanding for education provided. She demo no further  "skilled OT needs at this time.     Clinical Decision Makin.  OT Low:  "Pt evaluation falls under low complexity for evaluation coding due to performance deficits noted in 1-3 areas as stated above and 0 co-morbities affecting current functional status. Data obtained from problem focused assessments. No modifications or assistance was required for completion of evaluation. Only brief occupational profile and history review completed."     Plan:     During this hospitalization, patient does not require further acute OT services.  Please re-consult if situation changes.    · Plan of Care Reviewed with: patient    This Plan of care has been discussed with the patient who was involved in its development and understands and is in agreement with the identified goals and treatment plan    Time Tracking:     OT Date of Treatment: 17  OT Start Time: 1313  OT Stop Time: 1323  OT Total Time (min): 10 min    Billable Minutes:Evaluation 10    ANT Paulson  2017    "

## 2017-12-29 NOTE — PROGRESS NOTES
[facility] Hospital Medicine  Progress Note    Patient Name: Sussy Costa  MRN: [id number]  Patient Class: IP- Inpatient   Admission Date: 12/27/2017  Length of Stay: 1 days  Attending Physician: Alice Varela MD  Primary Care Provider: EZ Casillas MD    [facility] Medicine Team: [facility] [place] Alice Varela MD    Subjective:     Principal Problem:Pneumonia of left lower lobe due to infectious organism    HPI:  82 y.o. female presented to the ER with past medical history of asthma, hypertension, DM-2, hyperlipidemia.  She was in her usual state of good health until the acute onset of shortness of breath beginning 4 days prior to admission with gradually worsening course. There has been no improvement with her home medications. Yesterday, she developed upper, left-sided, abdominal pain associated with nausea and vomiting. Pertinent associated symptoms include productive cough, green/yellow sputum. Denies fever. Patient required corticosteroids for an asthma exacerbation about 2 weeks ago.    Hospital Course:  No notes on file    Interval History: Patient with no events overnight, no new complaints. Feels better.  Data Review: Results recorded below were reviewed 12/28/2017.    Review of Systems   Constitutional: Positive for fatigue.   Respiratory: Positive for cough and shortness of breath.      Objective:     Vital Signs (Most Recent):  Temp: 96.6 °F (35.9 °C) (12/28/17 1528)  Pulse: 93 (12/28/17 1528)  Resp: 20 (12/28/17 1528)  BP: (!) 101/57 (12/28/17 1528)  SpO2: 95 % (12/28/17 1528) Vital Signs (24h Range):  Temp:  [96.6 °F (35.9 °C)-97.8 °F (36.6 °C)] 96.6 °F (35.9 °C)  Pulse:  [70-96] 93  Resp:  [16-20] 20  SpO2:  [91 %-95 %] 95 %  BP: ()/(54-57) 101/57     Weight: 63.2 kg (139 lb 5.3 oz)  Body mass index is 23.19 kg/m².    Intake/Output Summary (Last 24 hours) at 12/28/17 2000  Last data filed at 12/28/17 1300   Gross per 24 hour   Intake              720 ml   Output                 0 ml   Net              720 ml      Physical Exam   Constitutional: She appears well-developed.   HENT:   Head: Normocephalic.   Mouth/Throat: Mucous membranes are not pale and not cyanotic.   Eyes: Conjunctivae and lids are normal. Pupils are equal.   Neck: Neck supple.   Cardiovascular: Normal rate, regular rhythm, S1 normal and S2 normal.    Pulmonary/Chest: Effort normal. She has wheezes (decreased). She has rales in the left lower field.   Abdominal: Soft. Bowel sounds are normal. There is no tenderness.   Musculoskeletal: She exhibits no edema.   Neurological: She is not disoriented.   Skin: Skin is warm and dry. No cyanosis. Nails show no clubbing.   Psychiatric: She has a normal mood and affect.       Significant Labs:   A1C:   Recent Labs  Lab 11/02/17  0855 12/27/17  0921   HGBA1C 6.8* 7.0*     ABGs:   Recent Labs  Lab 12/27/17  1047   PH 7.372   PCO2 45.9*   HCO3 26.7   POCSATURATED 42*   BE 1     CBC:   Recent Labs  Lab 12/27/17  0921 12/28/17  0511   WBC 35.42* 25.73*   HGB 12.4 9.7*   HCT 37.8 30.0*    251     CMP:   Recent Labs  Lab 12/27/17  0921 12/28/17  0511    142   K 3.9 3.6    109   CO2 24 24   * 263*   BUN 23 23   CREATININE 1.3 1.0   CALCIUM 10.1 8.9   PROT 7.2  --    ALBUMIN 3.2* 2.4*   BILITOT 1.3*  --    ALKPHOS 49*  --    AST 18  --    ALT 16  --    ANIONGAP 14 9   EGFRNONAA 38.6* 53.0*     Cardiac Markers:   Recent Labs  Lab 12/27/17  0921   *     Lactic Acid:   Recent Labs  Lab 12/27/17  1041   LACTATE 2.5*     Lipase:   Recent Labs  Lab 12/27/17  0921   LIPASE 40     Magnesium:   Recent Labs  Lab 12/28/17  0511   MG 1.6     POCT Glucose:   Recent Labs  Lab 12/28/17  0833 12/28/17  1255 12/28/17  1647   POCTGLUCOSE 231* 289* 250*     Troponin:   Recent Labs  Lab 12/27/17  0921 12/27/17  1419   TROPONINI 0.061* 0.019     TSH:   Recent Labs  Lab 11/02/17  0855   TSH 2.275     Urine Studies:   Recent Labs  Lab 12/27/17  1355   COLORU Yellow    APPEARANCEUA Clear   PHUR 5.0   SPECGRAV >=1.030*   PROTEINUA Negative   GLUCUA 3+*   KETONESU Trace*   BILIRUBINUA Negative   OCCULTUA Negative   NITRITE Negative   UROBILINOGEN Negative   LEUKOCYTESUR Negative   RBCUA 0   WBCUA 1   BACTERIA Rare   SQUAMEPITHEL 0   HYALINECASTS 1     Assessment/Plan:      Current Hospital Problem List:    Active Hospital Problems    Diagnosis  POA    *Pneumonia of left lower lobe due to infectious organism [J18.1]  Yes     Priority: 1 - High    Sepsis [A41.9]  Yes     Priority: 2     Asthma with exacerbation [J45.901]  Yes     Priority: 3     HTN (hypertension) [I10]  Yes    Diabetes mellitus, type II [E11.9]  Yes      Resolved Hospital Problems    Diagnosis Date Resolved POA   No resolved problems to display.       Ordered Medications for management of current problems:    amLODIPine  5 mg Oral Daily    ascorbic acid (vitamin C)  250 mg Oral Daily    atorvastatin  20 mg Oral Daily    azithromycin  500 mg Oral Daily    cefTRIAXone (ROCEPHIN) IVPB  1 g Intravenous Q24H    enoxaparin  40 mg Subcutaneous Daily    fluticasone-vilanterol  1 puff Inhalation Daily    [START ON 12/29/2017] insulin aspart  4 Units Subcutaneous TIDWM    insulin detemir  7 Units Subcutaneous BID    latanoprost  1 drop Both Eyes QHS    lisinopril-hydrochlorothiazide  2 tablet Oral Daily    olopatadine  1 drop Both Eyes Daily    predniSONE  40 mg Oral Daily    senna-docusate 8.6-50 mg  1 tablet Oral BID    vitamin D  1,000 Units Oral Daily       Risk  Patient has a condition that poses threat to life and bodily function: Respiratory Distress    Anticipated Disposition: Home or Self Care    Assessment and Plan by Problem:    * Pneumonia of left lower lobe due to infectious organism    Treating with ceftriaxone and azithromycin.  Slow improvement.        Sepsis    Leukocytosis, tachypnea, tachycardia.  Resolving.        Asthma with exacerbation    Treating with nebs, prednisone, and  continuing home medications. Held Singulair.  Improved.        Diabetes mellitus, type II    Non-insulin diabetes medications are held and the patient's hyperglycemia is managed with a SQ basal, prandial, correction dose insulin regimen.  Adjusting doses due to steroid-induced hyperglycemia.        HTN (hypertension)    Continuing home medications.          VTE Risk Mitigation         Ordered     enoxaparin injection 40 mg  Daily     Route:  Subcutaneous        12/27/17 1509     Medium Risk of VTE  Once      12/27/17 1509              Alice Varela MD  Department of Hospital Medicine   [facility]

## 2017-12-29 NOTE — PT/OT/SLP EVAL
Physical Therapy Evaluation and Discharge Note    Patient Name:  Sussy Costa   MRN:  [id number]    Recommendations:     Discharge Recommendations:  home   Discharge Equipment Recommendations: none   Barriers to discharge: None    Assessment:     Sussy Costa is a 82 y.o. female admitted with a medical diagnosis of Pneumonia of left lower lobe due to infectious organism. .  At this time, patient is functioning at their prior level of function and does not require further acute PT services.     Recent Surgery: * No surgery found *      Plan:     During this hospitalization, patient does not require further acute PT services.  Please re-consult if situation changes.     Plan of Care Reviewed with: patient    Subjective     Communicated with nsg prior to session.  Patient found seated in bedside chair upon PT entry to room, agreeable to evaluation.      Chief Complaint: not ambulating during hospital stay   Patient comments/goals: to discharge to home environment  Pain/Comfort:  · Pain Rating 1: 0/10    Patients cultural, spiritual, Mosque conflicts given the current situation:      Patient History:  · Home environment: lives in one story home c/ ; threshold to enter  · Assistance provided:    · Bathroom set up:  Tub/shower    Previous Level of Mobilty  · Transfers: (I)  · Gait: (I)    Additional Roles of Patient  · Working: no  · Driving: yes  · Hobbies:gardening    DME: none      Objective:     Patient found with: oxygen (3 LO2)     General Precautions: Standard, fall   Orthopedic Precautions:N/A   Braces: N/A       Exams:  Cognitive Exam  Patient is oriented to Person, Place, Time and Situation and follows 100% of multistep commands    Fine Motor Coordination    -       Intact  RLE heel shin and LLE heel shin   Postural Exam Patient presented with the following abnormalities:    -       No postural abnormalities identified   Sensation    -       Intact   Skin Integrity/Edema     -       Skin  integrity: Thin and Dry  -       Edema: None noted in BLE   R LE ROM WFL   R LE Strength  4/5 hip flexion, knee ext/flex, and ankle DF   L LE ROM WFL   L LE Strength  4/5 hip flexion, knee ext/flex, and ankle DF       Functional Mobility  Bed Mobility  Did not perform; found in bedside chair   Transfers Sit to Stand:  supervision with no AD for 1 trial     Gait Gait Distance: 300 ft no AD  Assistance Level: supervision  Description: Able to perform without SOB and without instability; able to perform task for 3 minutes without s&s of fatigue         Balance   Static Sitting supervision   Dynamic Sitting supervision   Static Standing supervision   Dynamic Standing supervision       AM-PAC 6 CLICK MOBILITY  Total Score:23       Therapeutic Activities and Exercises:    PT educated pt on the following  - role of PT  - PT POC (including frequency and duration while in hospital; dc from PT)  - discharge recommendation (home) and equipment needs (no need)  - level of assistance currently req (able to ambulate safely with family and nsg staff)and safety precautions with nsg staff (supervision;1 person )  All questions and concerns answered and addressed. White board updated with pertinent information. Nsg notified.       Patient left up in chair with all lines intact and call button in reach.    GOALS:    Physical Therapy Goals     Not on file          Multidisciplinary Problems (Resolved)        Problem: Physical Therapy Goal    Goal Priority Disciplines Outcome Goal Variances Interventions   Physical Therapy Goal   (Resolved)     PT/OT, PT Outcome(s) achieved                     History:     Past Medical History:   Diagnosis Date    Asthma     Cyst of pancreas     liver    Diabetes mellitus type II     Eczema of hand     Glaucoma     Hyperlipidemia     Hypertension     Lichen planus     gums    Osteoporosis     Pulmonary nodules        Past Surgical History:   Procedure Laterality Date    CATARACT EXTRACTION,  BILATERAL      CHOLECYSTECTOMY      HYSTERECTOMY      nasal polyps         Clinical Decision Making:     History  Co-morbidities and personal factors that may impact the plan of care Examination  Body Structures and Functions, activity limitations and participation restrictions that may impact the plan of care Clinical Presentation   Decision Making/ Complexity Score   Co-morbidities:   [] Time since onset of injury / illness / exacerbation  [] Status of current condition  []Patient's cognitive status and safety concerns    [] Multiple Medical Problems (see med hx)  Personal Factors:   [] Patient's age  [] Prior Level of function   [] Patient's home situation (environment and family support)  [] Patient's level of motivation  [] Expected progression of patient      HISTORY:(criteria)    [] 17468 - no personal factors/history    [] 92335 - has 1-2 personal factor/comorbidity     [] 18672 - has >3 personal factor/comorbidity     Body Regions:  [] Objective examination findings  [] Head     []  Neck  [] Trunk   [] Upper Extremity  [] Lower Extremity    Body Systems:  [] For communication ability, affect, cognition, language, and learning style: the assessment of the ability to make needs known, consciousness, orientation (person, place, and time), expected emotional /behavioral responses, and learning preferences (eg, learning barriers, education  needs)  [] For the neuromuscular system: a general assessment of gross coordinated movement (eg, balance, gait, locomotion, transfers, and transitions) and motor function  (motor control and motor learning)  [] For the musculoskeletal system: the assessment of gross symmetry, gross range of motion, gross strength, height, and weight  [] For the integumentary system: the assessment of pliability(texture), presence of scar formation, skin color, and skin integrity  [] For cardiovascular/pulmonary system: the assessment of heart rate, respiratory rate, blood pressure, and  edema     Activity limitations:    [] Patient's cognitive status and saf ety concerns          [] Status of current condition      [] Weight bearing restriction  [] Cardiopulmunary Restriction    Participation Restrictions:   [] Goals and goal agreement with the patient     [] Rehab potential (prognosis) and probable outcome      Examination of Body System: (criteria)    [] 26627 - addressing 1-2 elements    [] 14329 - addressing a total of 3 or more elements     [] 10305 -  Addressing a total of 4 or more elements         Clinical Presentation: (criteria)  Choose one     On examination of body system using standardized tests and measures patient presents with (CHOOSE ONE) elements from any of the following: body structures and functions, activity limitations, and/or participation restrictions.  Leading to a clinical presentation that is considered (CHOOSE ONE)                              Clinical Decision Making  (Eval Complexity):  Choose One     Time Tracking:     PT Received On: 12/29/17  PT Start Time: 1305     PT Stop Time: 1323  PT Total Time (min): 18 min     Billable Minutes: Evaluation 18      Jalyn Paul, PT, DPT  12/29/2017

## 2017-12-29 NOTE — ASSESSMENT & PLAN NOTE
Non-insulin diabetes medications are held and the patient's hyperglycemia is managed with a SQ basal, prandial, correction dose insulin regimen.  Adjusting doses due to steroid-induced hyperglycemia.

## 2017-12-29 NOTE — NURSING
Home Oxygen Evaluation    Date Performed: 2017    1) Patient's Home O2 Sat on room air, while at rest: 95%        If O2 sats on room air at rest are 88% or below, patient qualifies. No additional testing needed. Document N/A in steps 2 and 3. If 89% or above, complete steps 2.      2) Patient's O2 Sat on room air while exercisin%        If O2 sats on room air while exercising remain 89% or above patient does not qualify, no further testing needed Document N/A in step 3. If O2 sats on room air while exercising are 88% or below, continue to step 3.      3) Patient's O2 Sat while exercising on O2: 92% at 2 LPM         (Must show improvement from #2 for patients to qualify)    If O2 sats improve on oxygen, patient qualifies for portable oxygen. If not, the patient does not qualify.

## 2017-12-29 NOTE — SUBJECTIVE & OBJECTIVE
Interval History: Patient with no events overnight, no new complaints. Feels better.  Data Review: Results recorded below were reviewed 12/28/2017.    Review of Systems   Constitutional: Positive for fatigue.   Respiratory: Positive for cough and shortness of breath.      Objective:     Vital Signs (Most Recent):  Temp: 96.6 °F (35.9 °C) (12/28/17 1528)  Pulse: 93 (12/28/17 1528)  Resp: 20 (12/28/17 1528)  BP: (!) 101/57 (12/28/17 1528)  SpO2: 95 % (12/28/17 1528) Vital Signs (24h Range):  Temp:  [96.6 °F (35.9 °C)-97.8 °F (36.6 °C)] 96.6 °F (35.9 °C)  Pulse:  [70-96] 93  Resp:  [16-20] 20  SpO2:  [91 %-95 %] 95 %  BP: ()/(54-57) 101/57     Weight: 63.2 kg (139 lb 5.3 oz)  Body mass index is 23.19 kg/m².    Intake/Output Summary (Last 24 hours) at 12/28/17 2000  Last data filed at 12/28/17 1300   Gross per 24 hour   Intake              720 ml   Output                0 ml   Net              720 ml      Physical Exam   Constitutional: She appears well-developed.   HENT:   Head: Normocephalic.   Mouth/Throat: Mucous membranes are not pale and not cyanotic.   Eyes: Conjunctivae and lids are normal. Pupils are equal.   Neck: Neck supple.   Cardiovascular: Normal rate, regular rhythm, S1 normal and S2 normal.    Pulmonary/Chest: Effort normal. She has wheezes (decreased). She has rales in the left lower field.   Abdominal: Soft. Bowel sounds are normal. There is no tenderness.   Musculoskeletal: She exhibits no edema.   Neurological: She is not disoriented.   Skin: Skin is warm and dry. No cyanosis. Nails show no clubbing.   Psychiatric: She has a normal mood and affect.       Significant Labs:   A1C:   Recent Labs  Lab 11/02/17  0855 12/27/17  0921   HGBA1C 6.8* 7.0*     ABGs:   Recent Labs  Lab 12/27/17  1047   PH 7.372   PCO2 45.9*   HCO3 26.7   POCSATURATED 42*   BE 1     CBC:   Recent Labs  Lab 12/27/17  0921 12/28/17  0511   WBC 35.42* 25.73*   HGB 12.4 9.7*   HCT 37.8 30.0*    251     CMP:   Recent  Labs  Lab 12/27/17  0921 12/28/17  0511    142   K 3.9 3.6    109   CO2 24 24   * 263*   BUN 23 23   CREATININE 1.3 1.0   CALCIUM 10.1 8.9   PROT 7.2  --    ALBUMIN 3.2* 2.4*   BILITOT 1.3*  --    ALKPHOS 49*  --    AST 18  --    ALT 16  --    ANIONGAP 14 9   EGFRNONAA 38.6* 53.0*     Cardiac Markers:   Recent Labs  Lab 12/27/17  0921   *     Lactic Acid:   Recent Labs  Lab 12/27/17  1041   LACTATE 2.5*     Lipase:   Recent Labs  Lab 12/27/17  0921   LIPASE 40     Magnesium:   Recent Labs  Lab 12/28/17  0511   MG 1.6     POCT Glucose:   Recent Labs  Lab 12/28/17  0833 12/28/17  1255 12/28/17  1647   POCTGLUCOSE 231* 289* 250*     Troponin:   Recent Labs  Lab 12/27/17  0921 12/27/17  1419   TROPONINI 0.061* 0.019     TSH:   Recent Labs  Lab 11/02/17  0855   TSH 2.275     Urine Studies:   Recent Labs  Lab 12/27/17  1355   COLORU Yellow   APPEARANCEUA Clear   PHUR 5.0   SPECGRAV >=1.030*   PROTEINUA Negative   GLUCUA 3+*   KETONESU Trace*   BILIRUBINUA Negative   OCCULTUA Negative   NITRITE Negative   UROBILINOGEN Negative   LEUKOCYTESUR Negative   RBCUA 0   WBCUA 1   BACTERIA Rare   SQUAMEPITHEL 0   HYALINECASTS 1

## 2017-12-29 NOTE — PLAN OF CARE
PT is AAOX3, no acute changes noted during shift, pt is up ad papa with min asst, no skin breakdown noted, VSS. No falls noted. Fall precautions remain Pain assessed. No pain noted. Pt resting comfortable in bed. Call light in reach. Will continue to monitor.

## 2017-12-29 NOTE — PLAN OF CARE
Problem: Physical Therapy Goal  Goal: Physical Therapy Goal  Outcome: Outcome(s) achieved Date Met: 12/29/17  PT evaluation complete. No further acute PT services needed.    Jalyn Paul, PT, DPT  12/29/2017

## 2017-12-30 VITALS
DIASTOLIC BLOOD PRESSURE: 60 MMHG | OXYGEN SATURATION: 90 % | HEART RATE: 80 BPM | BODY MASS INDEX: 23.21 KG/M2 | WEIGHT: 139.31 LBS | TEMPERATURE: 97 F | HEIGHT: 65 IN | RESPIRATION RATE: 16 BRPM | SYSTOLIC BLOOD PRESSURE: 126 MMHG

## 2017-12-30 LAB
ALBUMIN SERPL BCP-MCNC: 2.4 G/DL
ANION GAP SERPL CALC-SCNC: 5 MMOL/L
BASOPHILS # BLD AUTO: 0.03 K/UL
BASOPHILS NFR BLD: 0.2 %
BUN SERPL-MCNC: 28 MG/DL
CALCIUM SERPL-MCNC: 8.6 MG/DL
CHLORIDE SERPL-SCNC: 110 MMOL/L
CO2 SERPL-SCNC: 32 MMOL/L
CREAT SERPL-MCNC: 0.8 MG/DL
DIFFERENTIAL METHOD: ABNORMAL
EOSINOPHIL # BLD AUTO: 0 K/UL
EOSINOPHIL NFR BLD: 0 %
ERYTHROCYTE [DISTWIDTH] IN BLOOD BY AUTOMATED COUNT: 14.6 %
EST. GFR  (AFRICAN AMERICAN): >60 ML/MIN/1.73 M^2
EST. GFR  (NON AFRICAN AMERICAN): >60 ML/MIN/1.73 M^2
GLUCOSE SERPL-MCNC: 139 MG/DL
HCT VFR BLD AUTO: 30.7 %
HGB BLD-MCNC: 9.7 G/DL
IMM GRANULOCYTES # BLD AUTO: 0.24 K/UL
IMM GRANULOCYTES NFR BLD AUTO: 1.3 %
LYMPHOCYTES # BLD AUTO: 2.3 K/UL
LYMPHOCYTES NFR BLD: 11.9 %
MAGNESIUM SERPL-MCNC: 2 MG/DL
MCH RBC QN AUTO: 28.3 PG
MCHC RBC AUTO-ENTMCNC: 31.6 G/DL
MCV RBC AUTO: 90 FL
MONOCYTES # BLD AUTO: 1 K/UL
MONOCYTES NFR BLD: 5.1 %
NEUTROPHILS # BLD AUTO: 15.4 K/UL
NEUTROPHILS NFR BLD: 81.5 %
NRBC BLD-RTO: 0 /100 WBC
PHOSPHATE SERPL-MCNC: 2.3 MG/DL
PLATELET # BLD AUTO: 271 K/UL
PMV BLD AUTO: 12.1 FL
POCT GLUCOSE: 104 MG/DL (ref 70–110)
POCT GLUCOSE: 154 MG/DL (ref 70–110)
POTASSIUM SERPL-SCNC: 3.8 MMOL/L
RBC # BLD AUTO: 3.43 M/UL
SODIUM SERPL-SCNC: 147 MMOL/L
WBC # BLD AUTO: 18.87 K/UL

## 2017-12-30 PROCEDURE — 63600175 PHARM REV CODE 636 W HCPCS: Performed by: INTERNAL MEDICINE

## 2017-12-30 PROCEDURE — 36415 COLL VENOUS BLD VENIPUNCTURE: CPT

## 2017-12-30 PROCEDURE — 83735 ASSAY OF MAGNESIUM: CPT

## 2017-12-30 PROCEDURE — 99239 HOSP IP/OBS DSCHRG MGMT >30: CPT | Mod: ,,, | Performed by: INTERNAL MEDICINE

## 2017-12-30 PROCEDURE — 25000242 PHARM REV CODE 250 ALT 637 W/ HCPCS: Performed by: INTERNAL MEDICINE

## 2017-12-30 PROCEDURE — 85025 COMPLETE CBC W/AUTO DIFF WBC: CPT

## 2017-12-30 PROCEDURE — 25000003 PHARM REV CODE 250: Performed by: INTERNAL MEDICINE

## 2017-12-30 PROCEDURE — 80069 RENAL FUNCTION PANEL: CPT

## 2017-12-30 RX ORDER — MOXIFLOXACIN HYDROCHLORIDE 400 MG/1
400 TABLET ORAL DAILY
Status: DISCONTINUED | OUTPATIENT
Start: 2017-12-30 | End: 2017-12-30 | Stop reason: HOSPADM

## 2017-12-30 RX ORDER — PREDNISONE 20 MG/1
40 TABLET ORAL DAILY
Qty: 14 TABLET | Refills: 0 | Status: SHIPPED | OUTPATIENT
Start: 2017-12-30 | End: 2018-01-06

## 2017-12-30 RX ORDER — MOXIFLOXACIN HYDROCHLORIDE 400 MG/1
400 TABLET ORAL DAILY
Qty: 7 TABLET | Refills: 0 | Status: SHIPPED | OUTPATIENT
Start: 2017-12-31 | End: 2018-01-07

## 2017-12-30 RX ORDER — LISINOPRIL AND HYDROCHLOROTHIAZIDE 10; 12.5 MG/1; MG/1
2 TABLET ORAL DAILY
Status: DISCONTINUED | OUTPATIENT
Start: 2017-12-31 | End: 2017-12-30 | Stop reason: HOSPADM

## 2017-12-30 RX ADMIN — CEFTRIAXONE SODIUM 1 G: 1 INJECTION, POWDER, FOR SOLUTION INTRAMUSCULAR; INTRAVENOUS at 08:12

## 2017-12-30 RX ADMIN — Medication 250 MG: at 08:12

## 2017-12-30 RX ADMIN — MOXIFLOXACIN HYDROCHLORIDE 400 MG: 400 TABLET, FILM COATED ORAL at 10:12

## 2017-12-30 RX ADMIN — INSULIN ASPART 6 UNITS: 100 INJECTION, SOLUTION INTRAVENOUS; SUBCUTANEOUS at 12:12

## 2017-12-30 RX ADMIN — VITAMIN D, TAB 1000IU (100/BT) 1000 UNITS: 25 TAB at 08:12

## 2017-12-30 RX ADMIN — AZITHROMYCIN 500 MG: 250 TABLET, FILM COATED ORAL at 08:12

## 2017-12-30 RX ADMIN — ATORVASTATIN CALCIUM 20 MG: 20 TABLET, FILM COATED ORAL at 08:12

## 2017-12-30 RX ADMIN — DIBASIC SODIUM PHOSPHATE, MONOBASIC POTASSIUM PHOSPHATE AND MONOBASIC SODIUM PHOSPHATE 2 TABLET: 852; 155; 130 TABLET ORAL at 01:12

## 2017-12-30 RX ADMIN — INSULIN ASPART 6 UNITS: 100 INJECTION, SOLUTION INTRAVENOUS; SUBCUTANEOUS at 08:12

## 2017-12-30 RX ADMIN — STANDARDIZED SENNA CONCENTRATE AND DOCUSATE SODIUM 1 TABLET: 8.6; 5 TABLET, FILM COATED ORAL at 08:12

## 2017-12-30 RX ADMIN — PREDNISONE 40 MG: 20 TABLET ORAL at 08:12

## 2017-12-30 RX ADMIN — OLOPATADINE HYDROCHLORIDE 1 DROP: 2 SOLUTION/ DROPS OPHTHALMIC at 08:12

## 2017-12-30 RX ADMIN — FLUTICASONE FUROATE AND VILANTEROL TRIFENATATE 1 PUFF: 200; 25 POWDER RESPIRATORY (INHALATION) at 08:12

## 2017-12-30 RX ADMIN — DIBASIC SODIUM PHOSPHATE, MONOBASIC POTASSIUM PHOSPHATE AND MONOBASIC SODIUM PHOSPHATE 2 TABLET: 852; 155; 130 TABLET ORAL at 08:12

## 2017-12-30 RX ADMIN — AMLODIPINE BESYLATE 5 MG: 5 TABLET ORAL at 08:12

## 2017-12-30 NOTE — ASSESSMENT & PLAN NOTE
Treating with ceftriaxone and azithromycin.  Slow improvement. Remains hypoxic. Continuing Acapella.

## 2017-12-30 NOTE — PROGRESS NOTES
Pt was provided with discharge instructions and education, verbalized understanding. NAD noted at time of discharge. Resp e/u. Denies pain.  at bedside. PIV removed, catheter intact. Waiting for  services to provide transport to personal vehicle.

## 2017-12-30 NOTE — NURSING
Cardiac monitoring discontinued. Telemetry tech notified. Box handed in to charge nurse Saul Minor.

## 2017-12-30 NOTE — PROGRESS NOTES
[facility] Hospital Medicine  Progress Note    Patient Name: Sussy Costa  MRN: [id number]  Patient Class: IP- Inpatient   Admission Date: 12/27/2017  Length of Stay: 2 days  Attending Physician: Alice Varela MD  Primary Care Provider: EZ Casillas MD    [facility] Medicine Team: [facility] MED D Alice Varela MD    Subjective:     Principal Problem:Pneumonia of left lower lobe due to infectious organism    HPI:  82 y.o. female presented to the ER with past medical history of asthma, hypertension, DM-2, hyperlipidemia.  She was in her usual state of good health until the acute onset of shortness of breath beginning 4 days prior to admission with gradually worsening course. There has been no improvement with her home medications. Yesterday, she developed upper, left-sided, abdominal pain associated with nausea and vomiting. Pertinent associated symptoms include productive cough, green/yellow sputum. Denies fever. Patient required corticosteroids for an asthma exacerbation about 2 weeks ago.    Hospital Course:  No notes on file    Interval History: Patient with no events overnight, no new complaints. Feels better but remains significantly hypoxic.  Data Review: Results recorded below were reviewed 12/29/2017.    Review of Systems   Constitutional: Positive for fatigue.   Respiratory: Positive for cough and shortness of breath.      Objective:     Vital Signs (Most Recent):  Temp: 98.2 °F (36.8 °C) (12/29/17 1938)  Pulse: 93 (12/29/17 1940)  Resp: 18 (12/29/17 1940)  BP: 114/68 (12/29/17 1938)  SpO2: 97 % (12/29/17 1940) Vital Signs (24h Range):  Temp:  [97.5 °F (36.4 °C)-98.2 °F (36.8 °C)] 98.2 °F (36.8 °C)  Pulse:  [63-93] 93  Resp:  [17-20] 18  SpO2:  [93 %-97 %] 97 %  BP: ()/(53-68) 114/68     Weight: 63.2 kg (139 lb 5.3 oz)  Body mass index is 23.19 kg/m².    Intake/Output Summary (Last 24 hours) at 12/29/17 5327  Last data filed at 12/29/17 2035   Gross per 24 hour   Intake               440 ml   Output                0 ml   Net              440 ml      Physical Exam   Constitutional: She appears well-developed.   HENT:   Head: Normocephalic.   Mouth/Throat: Mucous membranes are not pale and not cyanotic.   Eyes: Conjunctivae and lids are normal. Pupils are equal.   Neck: Neck supple.   Cardiovascular: Normal rate, regular rhythm, S1 normal and S2 normal.    Pulmonary/Chest: Effort normal. She has wheezes (decreased). She has rales in the left lower field.   Abdominal: Soft. Bowel sounds are normal. There is no tenderness.   Musculoskeletal: She exhibits no edema.   Neurological: She is not disoriented.   Skin: Skin is warm and dry. No cyanosis. Nails show no clubbing.   Psychiatric: She has a normal mood and affect.       Significant Labs:   A1C:     Recent Labs  Lab 11/02/17  0855 12/27/17  0921   HGBA1C 6.8* 7.0*     CBC:     Recent Labs  Lab 12/28/17  0511 12/29/17  0506   WBC 25.73* 21.93*   HGB 9.7* 9.3*   HCT 30.0* 28.6*    237     CMP:     Recent Labs  Lab 12/28/17  0511 12/29/17  0506    140   K 3.6 3.5    109   CO2 24 23   * 202*   BUN 23 35*   CREATININE 1.0 0.8   CALCIUM 8.9 8.5*   ALBUMIN 2.4* 2.3*   ANIONGAP 9 8   EGFRNONAA 53.0* >60.0     Magnesium:     Recent Labs  Lab 12/28/17  0511 12/29/17  0506   MG 1.6 2.1     POCT Glucose:     Recent Labs  Lab 12/29/17  1231 12/29/17  1744 12/29/17 2022   POCTGLUCOSE 204* 333* 301*     TSH:     Recent Labs  Lab 11/02/17  0855   TSH 2.275     Assessment/Plan:      Current Hospital Problem List:    Active Hospital Problems    Diagnosis  POA    *Pneumonia of left lower lobe due to infectious organism [J18.1]  Yes     Priority: 1 - High    Sepsis [A41.9]  Yes     Priority: 2     Asthma with exacerbation [J45.901]  Yes     Priority: 3     Acute respiratory failure with hypoxia [J96.01]  Yes    HTN (hypertension) [I10]  Yes    Diabetes mellitus, type II [E11.9]  Yes      Resolved Hospital Problems     Diagnosis Date Resolved POA   No resolved problems to display.       Ordered Medications for management of current problems:    amLODIPine  5 mg Oral Daily    ascorbic acid (vitamin C)  250 mg Oral Daily    atorvastatin  20 mg Oral Daily    azithromycin  500 mg Oral Daily    cefTRIAXone (ROCEPHIN) IVPB  1 g Intravenous Q24H    enoxaparin  40 mg Subcutaneous Daily    fluticasone-vilanterol  1 puff Inhalation Daily    [START ON 12/30/2017] insulin aspart  6 Units Subcutaneous TIDWM    [START ON 12/30/2017] insulin detemir  9 Units Subcutaneous BID    k phos di & mono-sod phos mono  2 tablet Oral TID WM    latanoprost  1 drop Both Eyes QHS    lisinopril-hydrochlorothiazide  2 tablet Oral Daily    olopatadine  1 drop Both Eyes Daily    predniSONE  40 mg Oral Daily    senna-docusate 8.6-50 mg  1 tablet Oral BID    vitamin D  1,000 Units Oral Daily       Risk  Patient has a condition that poses threat to life and bodily function: Respiratory Distress    Anticipated Disposition: Home or Self Care    Assessment and Plan by Problem:    * Pneumonia of left lower lobe due to infectious organism    Treating with ceftriaxone and azithromycin.  Slow improvement. Remains hypoxic. Continuing Acapella.        Sepsis    Leukocytosis, tachypnea, tachycardia.  Resolving.        Asthma with exacerbation    Treating with nebs, prednisone, and continuing home medications. Held Singulair.  Improved.        Diabetes mellitus, type II    Non-insulin diabetes medications are held and the patient's hyperglycemia is managed with a SQ basal, prandial, correction dose insulin regimen.  Adjusting doses due to steroid-induced hyperglycemia.        HTN (hypertension)    Continuing home medications.          VTE Risk Mitigation         Ordered     enoxaparin injection 40 mg  Daily     Route:  Subcutaneous        12/27/17 1509     Medium Risk of VTE  Once      12/27/17 1509              Alice Varela MD  Department of Hospital  Medicine   [facility]

## 2017-12-30 NOTE — PROGRESS NOTES
Pts oxygen was removed this morning and O2 sat was checked while pt was at rest on room air. SpO2 read 96%. Pt sat on edge of bed and then ambulated at a slow pace through all halls for >6 minutes without the use of oxygen. Pt took two short rest breaks while standing in the halls. SpO2 never went below 90%. For the majority of the walk, the SpO2 read 91-92%. Faint wheezing noted and pt became slightly SOB towards the end. When sitting down on bed, Spo2 increased back up to 96% within seconds.

## 2017-12-30 NOTE — ASSESSMENT & PLAN NOTE
Treated with ceftriaxone and azithromycin. Respiratory treatments.  Slow improvement. Remained hypoxic. Continued Acapella.  Antibiotic changed to Avelox at discharge.

## 2017-12-30 NOTE — SUBJECTIVE & OBJECTIVE
Interval History: Patient with no events overnight, no new complaints. Feels better but remains significantly hypoxic.  Data Review: Results recorded below were reviewed 12/29/2017.    Review of Systems   Constitutional: Positive for fatigue.   Respiratory: Positive for cough and shortness of breath.      Objective:     Vital Signs (Most Recent):  Temp: 98.2 °F (36.8 °C) (12/29/17 1938)  Pulse: 93 (12/29/17 1940)  Resp: 18 (12/29/17 1940)  BP: 114/68 (12/29/17 1938)  SpO2: 97 % (12/29/17 1940) Vital Signs (24h Range):  Temp:  [97.5 °F (36.4 °C)-98.2 °F (36.8 °C)] 98.2 °F (36.8 °C)  Pulse:  [63-93] 93  Resp:  [17-20] 18  SpO2:  [93 %-97 %] 97 %  BP: ()/(53-68) 114/68     Weight: 63.2 kg (139 lb 5.3 oz)  Body mass index is 23.19 kg/m².    Intake/Output Summary (Last 24 hours) at 12/29/17 2213  Last data filed at 12/29/17 2035   Gross per 24 hour   Intake              440 ml   Output                0 ml   Net              440 ml      Physical Exam   Constitutional: She appears well-developed.   HENT:   Head: Normocephalic.   Mouth/Throat: Mucous membranes are not pale and not cyanotic.   Eyes: Conjunctivae and lids are normal. Pupils are equal.   Neck: Neck supple.   Cardiovascular: Normal rate, regular rhythm, S1 normal and S2 normal.    Pulmonary/Chest: Effort normal. She has wheezes (decreased). She has rales in the left lower field.   Abdominal: Soft. Bowel sounds are normal. There is no tenderness.   Musculoskeletal: She exhibits no edema.   Neurological: She is not disoriented.   Skin: Skin is warm and dry. No cyanosis. Nails show no clubbing.   Psychiatric: She has a normal mood and affect.       Significant Labs:   A1C:     Recent Labs  Lab 11/02/17  0855 12/27/17  0921   HGBA1C 6.8* 7.0*     CBC:     Recent Labs  Lab 12/28/17  0511 12/29/17  0506   WBC 25.73* 21.93*   HGB 9.7* 9.3*   HCT 30.0* 28.6*    237     CMP:     Recent Labs  Lab 12/28/17  0511 12/29/17  0506    140   K 3.6 3.5   CL  109 109   CO2 24 23   * 202*   BUN 23 35*   CREATININE 1.0 0.8   CALCIUM 8.9 8.5*   ALBUMIN 2.4* 2.3*   ANIONGAP 9 8   EGFRNONAA 53.0* >60.0     Magnesium:     Recent Labs  Lab 12/28/17  0511 12/29/17  0506   MG 1.6 2.1     POCT Glucose:     Recent Labs  Lab 12/29/17  1231 12/29/17  1744 12/29/17  2022   POCTGLUCOSE 204* 333* 301*     TSH:     Recent Labs  Lab 11/02/17  0855   TSH 2.275

## 2017-12-30 NOTE — DISCHARGE SUMMARY
[facility] Hospital Medicine  Discharge Summary      Patient Name: Sussy Costa  MRN: [id number]  Admission Date: 12/27/2017  Hospital Length of Stay: 3 days  Discharge Date and Time: 12/30/2017  3:18 PM  Attending Physician: Alice Varela MD   Discharging Provider: Alice Varela MD  Primary Care Provider: EZ Casillas MD  [facility] Medicine Team: [facility] HOSP MED D Alice Varela MD    HPI:   82 y.o. female presented to the ER with past medical history of asthma, hypertension, DM-2, hyperlipidemia.  She was in her usual state of good health until the acute onset of shortness of breath beginning 4 days prior to admission with gradually worsening course. There has been no improvement with her home medications. Yesterday, she developed upper, left-sided, abdominal pain associated with nausea and vomiting. Pertinent associated symptoms include productive cough, green/yellow sputum. Denies fever. Patient required corticosteroids for an asthma exacerbation about 2 weeks ago.    * No surgery found *      Hospital Course:      * Pneumonia of left lower lobe due to infectious organism    Treated with ceftriaxone and azithromycin. Respiratory treatments.  Slow improvement. Remained hypoxic. Continued Acapella.  Antibiotic changed to Avelox at discharge.        Sepsis    Leukocytosis, tachypnea, tachycardia.  Resolved.        Asthma with exacerbation    Treated with nebs, prednisone, and continuing home medications. Held Singulair.  Improved.        Acute respiratory failure with hypoxia    Patient required supplemental oxygen during her hospital stay; hypoxia now resolving with RA O2 sat 90% with ambulation.        Diabetes mellitus, type II    Non-insulin diabetes medications were held and the patient's hyperglycemia was managed with a SQ basal, prandial, correction dose insulin regimen.  Adjusted doses due to steroid-induced hyperglycemia.        HTN (hypertension)    Continuing home  medications.          Consults:   Consults         Status Ordering Provider     Inpatient consult to Social Work/Case Management  Once     Provider:  (Not yet assigned)    Acknowledged LUIS GARCIA        Final Active Diagnoses:    Diagnosis Date Noted POA    PRINCIPAL PROBLEM:  Pneumonia of left lower lobe due to infectious organism [J18.1] 12/27/2017 Yes    Sepsis [A41.9] 12/27/2017 Yes    Asthma with exacerbation [J45.901] 04/22/2013 Yes    Acute respiratory failure with hypoxia [J96.01] 12/29/2017 Yes    HTN (hypertension) [I10] 09/24/2012 Yes    Diabetes mellitus, type II [E11.9] 09/24/2012 Yes      Problems Resolved During this Admission:    Diagnosis Date Noted Date Resolved POA       Discharged Condition: good    Disposition: Home or Self Care    Follow Up:  Follow-up Information     P Papa Casillas MD. Schedule an appointment as soon as possible for a visit in 1 week.    Specialty:  Internal Medicine  Why:  For discharge from hospital follow up  Contact information:  [address] [facility] [address]  [phone]                 Patient Instructions:     Diet Diabetic 2000 Calories     Activity as tolerated     Notify your health care provider if you experience any of the following:  temperature >100.4     Notify your health care provider if you experience any of the following:  persistent nausea and vomiting or diarrhea     Notify your health care provider if you experience any of the following:  difficulty breathing or increased cough     Notify your health care provider if you experience any of the following:  persistent dizziness, light-headedness, or visual disturbances       Significant Diagnostic Studies:    Hemoglobin A1C   Date Value Ref Range Status   12/27/2017 7.0 (H) 4.0 - 5.6 % Final   11/02/2017 6.8 (H) 4.0 - 5.6 % Final     CBC:   Recent Labs  Lab 12/30/17  0403   WBC 18.87*   RBC 3.43*   HGB 9.7*   HCT 30.7*      MCV 90   MCH 28.3   MCHC 31.6*     CMP:    Recent Labs  Lab 12/27/17  0921  12/30/17  0403   *  < > 139*   CALCIUM 10.1  < > 8.6*   ALBUMIN 3.2*  < > 2.4*   PROT 7.2  --   --      < > 147*   K 3.9  < > 3.8   CO2 24  < > 32*     < > 110   BUN 23  < > 28*   CREATININE 1.3  < > 0.8   ALKPHOS 49*  --   --    ALT 16  --   --    AST 18  --   --    BILITOT 1.3*  --   --    < > = values in this interval not displayed.    Cardiac markers:   Recent Labs  Lab 12/27/17  1419   TROPONINI 0.019     ABGs:   Recent Labs  Lab 12/27/17  1047   PH 7.372   PCO2 45.9*   PO2 25*   HCO3 26.7   POCSATURATED 42*   BE 1     Microbiology Results (last 7 days)     Procedure Component Value Units Date/Time    Blood culture #1 **CANNOT BE ORDERED STAT** [[id number]] Collected:  12/27/17 1154    Order Status:  Completed Specimen:  Blood from Peripheral, Antecubital, Left Updated:  12/30/17 1412     Blood Culture, Routine No Growth to date     Blood Culture, Routine No Growth to date     Blood Culture, Routine No Growth to date     Blood Culture, Routine No Growth to date    Blood culture #2 **CANNOT BE ORDERED STAT** [[id number]] Collected:  12/27/17 1208    Order Status:  Completed Specimen:  Blood from Peripheral, Antecubital, Right Updated:  12/30/17 1412     Blood Culture, Routine No Growth to date     Blood Culture, Routine No Growth to date     Blood Culture, Routine No Growth to date     Blood Culture, Routine No Growth to date    Culture, Respiratory with Gram Stain [[id number]]     Order Status:  Canceled Specimen:  Respiratory           Recent Labs  Lab 12/27/17  1355   COLORU Yellow   SPECGRAV >=1.030*   PHUR 5.0   PROTEINUA Negative   BACTERIA Rare   NITRITE Negative   LEUKOCYTESUR Negative   UROBILINOGEN Negative   HYALINECASTS 1     Imaging Results          US Lower Extremity Veins Bilateral (Final result)  Result time 12/27/17 18:55:23    Final result by Cassia Echevarria MD (12/27/17 18:55:23)                 Impression:         No evidence of deep  venous thrombosis in either lower extremity.      ______________________________________     Electronically signed by resident: PIERRE REYES MD  Date:     12/27/17  Time:    18:50            As the supervising and teaching physician, I personally reviewed the images and resident's interpretation and I agree with the findings.          Electronically signed by: Cassia Echevarria MD  Date:     12/27/17  Time:    18:55              Narrative:    BILATERAL LOWER EXTREMITY DUPLEX ULTRASOUND.    Indication: left lower extremity swelling    Technique: Duplex scan of the bilateral lower extremities was performed using B-Mode/gray scale imaging, and Doppler spectral analysis and color flow.    Comparison: None.    FINDINGS:    RIGHT LEG:  The common femoral, superficial femoral, popliteal, peroneal and anterior and posterior tibial veins show no signs of deep vein thrombosis.  The common femoral, superficial femoral and popliteal veins were examined using spectral analysis and show normal wave forms with normal response to augmentation and respiration.    LEFT LEG:  The common femoral, superficial femoral, popliteal, peroneal and anterior and posterior tibial veins show no signs of deep vein thrombosis.  The common femoral, superficial femoral and popliteal veins were examined using spectral analysis and show normal wave forms with normal response to augmentation and respiration.                             CT Abdomen Pelvis With Contrast (Final result)  Result time 12/27/17 12:31:55    Final result by Johan Miles MD (12/27/17 12:31:55)                 Impression:       LEFT basilar and small RIGHT middle lobe consolidation, likely representing pneumonia.    Incidental finding of a 0.8 cm nodule within the RIGHT lung base.  This is of unknown chronicity due to lack of old images.  Consider obtaining dedicated CT chest and subsequent followup per Fleischner Society guidelines.     Multiple pancreatic hypodensities, largest  within the pancreatic head, as seen on previous MRI study.  Differential consideration includes side branch IPMN, pseudocyst or cystic neoplasm.    Multiple hepatic hypodensities consistent with simple hepatic cysts.    Status post cholecystectomy and hysterectomy.    ______________________________________     Electronically signed by resident: PREMA POLANCO  Date:     12/27/17  Time:    12:21            As the supervising and teaching physician, I personally reviewed the images and resident's interpretation and I agree with the findings.            Electronically signed by: Johan Miles MD  Date:     12/27/17  Time:    12:31              Narrative:    CT ABDOMEN, and, PELVIS  with IV contrast    Protocol:  Axial CT images of the abdomen and pelvis were acquired  after the use of 75 cc [id number] IV contrast.  Coronal and sagittal reconstructions were acquired.    HISTORY:  82 year old F with epigastric abd pain    COMPARISON: MRI abdomen 10/15/2016.     FINDINGS:  Heart: Normal in size. No pericardial effusion.     Lung Bases: Consolidation of the LEFT lung base with minimal involvement of the RIGHT middle lobe, consistent with pneumonia. There is a 0.8 cm soft tissue nodule within the RIGHT lung base, this is of unknown chronicity due to lack of old images.    Liver: Normal in size and attenuation, with no focal hepatic lesions. Multiple subcentimeter hepatic hypodensities, unchanged from previous MRI study, consistent with simple hepatic cysts.      Gallbladder: Status post cholecystectomy.     Bile Ducts: No evidence of dilated ducts.     Pancreas: Multiple non-enhancing pancreatic hypodensities, largest within the pancreatic head.  This is unchanged from MRI study of 04/07/2017.  Differential consideration include side branch IPMN, pseudocyst or cystic neoplasm.      Spleen: Unremarkable.     Adrenals: Unremarkable.     Kidneys/ Ureters: Normal in size and location. Normal concentration and excretion of contrast.  No hydronephrosis or nephrolithiasis. No ureteral dilatation.     Bladder: No evidence of wall thickening.     Reproductive organs: Status post hysterectomy.     GI Tract/Mesentery: No evidence of bowel obstruction or inflammation. Nonspecific small round radiopaque material within the duodenum.  Small hiatal hernia.    Peritoneal Space: No ascites. No free air.     Retroperitoneum:  No significant adenopathy.      Abdominal wall:  Unremarkable.     Vasculature: The visualized portion of thoracic and abdominal aorta demonstrate atherosclerotic calcification throughout course.     Bones: No acute fracture. Age-appropriate degenerative changes.                             X-Ray Chest PA And Lateral (Final result)  Result time 12/27/17 10:28:34    Final result by Per Dominguez III, MD (12/27/17 10:28:34)                 Impression:     Left lower lobe pneumonia.      Electronically signed by: PER DOMINGUEZ MD  Date:     12/27/17  Time:    10:28              Narrative:    2 views: Heart size is normal. There is left lower lobe infiltrate. Right lung is clear.                            Pending Diagnostic Studies:     None         Medications:  Reconciled Home Medications:   Discharge Medication List as of 12/30/2017  2:18 PM      START taking these medications    Details   moxifloxacin (AVELOX) 400 mg tablet Take 1 tablet (400 mg total) by mouth once daily., Starting Sun 12/31/2017, Until Sun 1/7/2018, Print         CONTINUE these medications which have CHANGED    Details   predniSONE (DELTASONE) 20 MG tablet Take 2 tablets (40 mg total) by mouth once daily., Starting Sat 12/30/2017, Until Sat 1/6/2018, Print         CONTINUE these medications which have NOT CHANGED    Details   acetaminophen (TYLENOL EXTRA STRENGTH) 500 MG tablet Take by mouth., Starting 6/11/2012, Until Discontinued, Historical Med      alendronate (FOSAMAX) 70 MG tablet TAKE ONE TABLET BY MOUTH EVERY 7 DAYS, Normal      amlodipine (NORVASC) 5 MG  tablet TAKE ONE TABLET BY MOUTH ONE TIME DAILY , Normal      ascorbic acid (VITAMIN C) 100 MG tablet Take 100 mg by mouth once daily., Until Discontinued, Historical Med      atorvastatin (LIPITOR) 20 MG tablet TAKE ONE TABLET BY MOUTH IN THE EVENING FOR CHOLESTEROL, Normal      !! blood sugar diagnostic (CONTOUR TEST STRIPS) Strp 1 strip by MISCELLANEOUS route 2 (two) times daily., Starting 2/18/2015, Until Discontinued, Normal      !! blood sugar diagnostic Strp 1 strip by Misc.(Non-Drug; Combo Route) route once daily., Starting 2/18/2015, Until Discontinued, Normal      blood-glucose meter kit Use as instructed, Print      calcium carbonate 220 mg capsule Take 250 mg by mouth 2 (two) times daily with meals.  , Until Discontinued, Historical Med      cod liver oil Oil Take by mouth., Until Discontinued, Historical Med      fluocinonide (LIDEX) 0.05 % gel Apply 1 application topically 2 (two) times daily.  , Until Discontinued, Historical Med      fluocinonide 0.05% (LIDEX) 0.05 % cream Starting Mon 5/1/2017, Historical Med      fluticasone-salmeterol 500-50 mcg/dose (ADVAIR) 500-50 mcg/dose DsDv diskus inhaler Inhale 1 puff into the lungs 2 (two) times daily., Starting Thu 10/5/2017, Until Fri 10/5/2018, Normal      glimepiride (AMARYL) 2 MG tablet TAKE ONE TABLET BY MOUTH IN THE MORNING WITH BREAKFAST, Normal      lancets (MICROLET LANCET) Misc Check blood sugar 2 times daily, Normal      latanoprost (XALATAN) 0.005 % ophthalmic solution Inject into the eye. 1 Drops Ophthalmic Every evening, Until Discontinued, Historical Med      levalbuterol (XOPENEX) 0.63 mg/3 mL nebulizer solution Take 3 mLs (0.63 mg total) by nebulization 3 (three) times daily as needed for Wheezing., Starting 8/16/2016, Until Discontinued, Normal      lisinopril-hydrochlorothiazide (PRINZIDE,ZESTORETIC) 10-12.5 mg per tablet TAKE TWO TABLETS BY MOUTH DAILY , Normal      meclizine (ANTIVERT) 12.5 mg tablet Take 1 tablet (12.5 mg total) by  "mouth 2 (two) times daily as needed., Starting 7/9/2014, Until Discontinued, Normal      metformin (GLUCOPHAGE-XR) 500 MG 24 hr tablet Take 1 tablet (500 mg total) by mouth daily with breakfast., Normal      montelukast (SINGULAIR) 10 mg tablet TAKE ONE TABLET BY MOUTH NIGHTLY, Normal      NEBULIZER ACCESSORIES (NEBULIZER MISC) Starting 6/11/2012, Until Discontinued, Historical Med      neomycin-polymyxin-dexamethasone (MAXITROL) 3.5mg/mL-10,000 unit/mL-0.1 % DrpS Starting 8/25/2016, Until Discontinued, Historical Med      olopatadine (PATANOL) 0.1 % ophthalmic solution Inject into the eye. 1 Drops Ophthalmic Twice a day , Until Discontinued, Historical Med      OMNARIS 50 mcg Spry 2 sprays by Each Nare route once daily., Starting Wed 7/26/2017, Normal      SODIUM CHLORIDE/SODIUM BICARB (NEILMED SINUS RINSE COMPLETE NASL) by Nasal route 2 (two) times daily. Uses in distilled water., Until Discontinued, Historical Med      triamcinolone acetonide 0.1% (KENALOG) 0.1 % cream Apply topically 2 (two) times daily., Starting 8/19/2016, Until Discontinued, Normal      VENTOLIN HFA 90 mcg/actuation inhaler INHALE 2 PUFFS INTO THE LUNGS EVERY 6 HOURS AS NEEDED, Normal      vitamin D 185 MG Tab Take 185 mg by mouth once daily.  , Until Discontinued, Historical Med       !! - Potential duplicate medications found. Please discuss with provider.      STOP taking these medications       levoFLOXacin (LEVAQUIN) 500 MG tablet Comments:   Reason for Stopping:               Indwelling Lines/Drains at time of discharge:   Lines/Drains/Airways          No matching active lines, drains, or airways          Time spent on the discharge of patient: 40 minutes. Included reviewing hospital course with patient/family, reviewing discharge medications, and arranging follow-up care. Face to face services were provided on  12/30/2017   Physical Exam on 12/30/2017:  height is 5' 5" (1.651 m) and weight is 63.2 kg (139 lb 5.3 oz). Her oral " temperature is 97.3 °F (36.3 °C). Her blood pressure is 126/60 and her pulse is 80. Her respiration is 16 and oxygen saturation is 90% (abnormal).   Patient was seen and examined on the date of discharge and determined to be suitable for discharge.         Alice Varela MD  Department of Hospital Medicine  [facility]

## 2018-01-01 LAB
BACTERIA BLD CULT: NORMAL
BACTERIA BLD CULT: NORMAL

## 2018-01-01 NOTE — ASSESSMENT & PLAN NOTE
Non-insulin diabetes medications were held and the patient's hyperglycemia was managed with a SQ basal, prandial, correction dose insulin regimen.  Adjusted doses due to steroid-induced hyperglycemia.

## 2018-01-01 NOTE — ASSESSMENT & PLAN NOTE
Patient required supplemental oxygen during her hospital stay; hypoxia now resolving with RA O2 sat 90% with ambulation.

## 2018-01-03 ENCOUNTER — PATIENT OUTREACH (OUTPATIENT)
Dept: ADMINISTRATIVE | Facility: CLINIC | Age: 83
End: 2018-01-03

## 2018-01-03 NOTE — PATIENT INSTRUCTIONS
Pneumonia (Adult)  Pneumonia is an infection deep within the lungs. It is in the small air sacs (alveoli). Pneumonia may be caused by a virus or bacteria. Pneumonia caused by bacteria is usually treated with an antibiotic. Severe cases may need to be treated in the hospital. Milder cases can be treated at home. Symptoms usually start to get better during the first 2 days of treatment.    Home care  Follow these guidelines when caring for yourself at home:  · Rest at home for the first 2 to 3 days, or until you feel stronger. Dont let yourself get overly tired when you go back to your activities.  · Stay away from cigarette smoke - yours or other peoples.  · You may use acetaminophen or ibuprofen to control fever or pain, unless another medicine was prescribed. If you have chronic liver or kidney disease, talk with your healthcare provider before using these medicines. Also talk with your provider if youve had a stomach ulcer or gastrointestinal bleeding. Dont give aspirin to anyone younger than 18 years of age who is ill with a fever. It may cause severe liver damage.  · Your appetite may be poor, so a light diet is fine.  · Drink 6 to 8 glasses of fluids every day to make sure you are getting enough fluids. Beverages can include water, sport drinks, sodas without caffeine, juices, tea, or soup. Fluids will help loosen secretions in the lung. This will make it easier for you to cough up the phlegm (sputum). If you also have heart or kidney disease, check with your healthcare provider before you drink extra fluids.  · Take antibiotic medicine prescribed until it is all gone, even if you are feeling better after a few days.  Follow-up care  Follow up with your healthcare provider in the next 2 to 3 days, or as advised. This is to be sure the medicine is helping you get better.  If you are 65 or older, you should get a pneumococcal vaccine and a yearly flu (influenza) shot. You should also get these vaccines if  you have chronic lung disease like asthma, emphysema, or COPD. Recently, a second type of pneumonia vaccine has become available for everyone over 65 years old. This is in addition to the previous vaccine. Ask your provider about this.  When to seek medical advice  Call your healthcare provider right away if any of these occur:  · You dont get better within the first 48 hours of treatment  · Shortness of breath gets worse  · Rapid breathing (more than 25 breaths per minute)  · Coughing up blood  · Chest pain gets worse with breathing  · Fever of 100.4°F (38°C) or higher that doesnt get better with fever medicine  · Weakness, dizziness, or fainting that gets worse  · Thirst or dry mouth that gets worse  · Sinus pain, headache, or a stiff neck  · Chest pain not caused by coughing  Date Last Reviewed: 1/1/2017  © 9099-1912 [employer]. [address] [place] [address]. All rights reserved. This information is not intended as a substitute for professional medical care. Always follow your healthcare professional's instructions.        Sepsis     To treat sepsis, antibiotics and fluids may by given through an intravenous (IV) line.     Sepsis happens when your body responds with widespread inflammation to a bad infection or bacteremia--the presence of bacteria in your bloodstream. Sepsis can be deadly. Blood pressure may drop and the lungs and kidneys may start to fail. Emergency care for sepsis is crucial.  Risk factors  Those most at risk for sepsis are:  · Infants or older adults  · People who have an illness that weakens their immune system, such as cancer, AIDS, or diabetes  · People being treated with chemotherapy medicines or radiation, which weakens the immune system  · People who have had a transplant  · People with a very severe infection such as pneumonia, meningitis, or a urinary tract infection  When to go to the emergency department (ED)  Sepsis is an emergency. Go to the nearest ED  if you have a fever with any of these symptoms:  · Chills and shaking  · Rapid heartbeat and breathing  · Trouble breathing  · Severe nausea or uncontrolled vomiting  · Confusion, disorientation, drowsiness, or dizziness  · Decreased urination  · Severe pain, including in the back or joints   What to expect in the ED  · Blood and urine tests are done to look for bacteria. They also check for organ failure.  · Blood, urine, or sputum cultures may be taken. The samples are sent to a lab. They are placed in a special container. Any bacteria should grow in 24 hours.  · X-rays or other imaging tests may be done.  A person with sepsis will be admitted to the hospital and treated with antibiotics. Treatment may also include oxygen and intravenous fluids.  Date Last Reviewed: 10/1/2016  © 1256-9203 [employer]. [address] [place] [address], [place] [address]. All rights reserved. This information is not intended as a substitute for professional medical care. Always follow your healthcare professional's instructions.        Preventing Common Respiratory Infections  Respiratory infections such as colds and influenza (the flu) are common in winter. These infections are often caused by viruses. They may share some symptoms, but not all respiratory infections are the same. Some make you more sick than others. You can take steps to prevent common respiratory infections. And if you get sick, you can take care of yourself to keep the infection from getting worse.    What is a cold?  · Symptoms include runny nose, coughing and sneezing, and sore throat. Cold symptoms tend to be milder than flu symptoms.  · Symptoms tend to come on slowly. They last for a few days to about a week.  · With a cold, you can still do most of the things you usually do.  What is the flu?  · Symptoms include fever, headache, fatigue, cough, sore throat, runny nose, and muscle aches. Children may have upset stomach and vomiting, but adults usually  dont.  · Symptoms tend to come on quickly. Some, such as fatigue and cough, can last a few weeks.  · With the flu, you may feel worn out and not able to do normal activities.  · Its most likely NOT the flu if an adult has vomiting or diarrhea for a day or two. This so-called stomach flu is probably a GI (gastrointestinal) infection.  When the infection gets worse  Without proper care, a respiratory infection can get worse. It can lead to serious complications and death. If you arent getting better, call your healthcare provider. Complications can include:  · Bronchitis (infection of the airways that leads to shortness of breath and coughing up thick yellow or green mucus)  · Pneumonia (infection of the lungs in which fluid and mucus settle in the lungs, making breathing difficult)  · Worsening of chronic conditions such as heart failure, chronic lung disease, asthma, or diabetes  · Severe dehydration (loss of fluids)  · Sinus problems  · Ear infections   Get a flu vaccine  A flu vaccine protects you from influenza (but not other colds or infections). Get a vaccine each fall, before flu season starts. This can be done at a clinic, healthcare providers office, drugstore, [facility] center, or through your workplace.  Get pneumococcal vaccines  Pneumonia can be a complication of influenza. There are 2 pneumococcal pneumonia vaccines that protect against many types of pneumonia. Talk with your healthcare provider about these important vaccines.   Keep germs from spreading  No one likes getting sick. To protect yourself and others from cold and flu germs:  · Wash your hands often. Use alcohol-based hand  when you dont have access to soap and water.  · Dont touch your eyes, nose, and mouth. This may help you keep germs out of your body.  · Try to avoid people with respiratory infections. You may want to stay out of crowds during flu season (winter).  · Ask your healthcare provider if you should get a  pneumonia vaccination.  How to wash your hands  · Use warm water and plenty of soap. Work up a good lather.  · Clean your whole hand, under your nails, between your fingers, and up your wrists. Wash for at least 15 to 20 seconds. Dont just wipe--rub well.  · Rinse. Let the water run down your fingertips, not up your wrists.  · In a public restroom, use a paper towel to turn off the faucet and open the door.   Date Last Reviewed: 12/1/2016  © 8991-4243 [employer]. [address] [place] [address], [place] [address]. All rights reserved. This information is not intended as a substitute for professional medical care. Always follow your healthcare professional's instructions.

## 2018-01-11 ENCOUNTER — OFFICE VISIT (OUTPATIENT)
Dept: INTERNAL MEDICINE | Facility: CLINIC | Age: 83
End: 2018-01-11
Payer: MEDICARE

## 2018-01-11 VITALS
HEART RATE: 77 BPM | DIASTOLIC BLOOD PRESSURE: 66 MMHG | SYSTOLIC BLOOD PRESSURE: 129 MMHG | TEMPERATURE: 99 F | BODY MASS INDEX: 22.56 KG/M2 | RESPIRATION RATE: 18 BRPM | WEIGHT: 135.38 LBS | HEIGHT: 65 IN

## 2018-01-11 DIAGNOSIS — J18.9 PNEUMONIA OF LEFT LOWER LOBE DUE TO INFECTIOUS ORGANISM: Primary | ICD-10-CM

## 2018-01-11 PROCEDURE — 99213 OFFICE O/P EST LOW 20 MIN: CPT | Mod: PBBFAC,PO | Performed by: INTERNAL MEDICINE

## 2018-01-11 PROCEDURE — 99214 OFFICE O/P EST MOD 30 MIN: CPT | Mod: S$PBB,,, | Performed by: INTERNAL MEDICINE

## 2018-01-11 PROCEDURE — 99999 PR PBB SHADOW E&M-EST. PATIENT-LVL III: CPT | Mod: PBBFAC,,, | Performed by: INTERNAL MEDICINE

## 2018-01-11 RX ORDER — LEVALBUTEROL INHALATION SOLUTION 0.63 MG/3ML
3 SOLUTION RESPIRATORY (INHALATION) 3 TIMES DAILY PRN
Qty: 36 ML | Refills: 6 | Status: ON HOLD | OUTPATIENT
Start: 2018-01-11 | End: 2020-09-30

## 2018-01-11 NOTE — PROGRESS NOTES
This office note has been dictated.  HISTORY OF PRESENT ILLNESS:  This is an 82-year-old lady following up from   recent hospitalization on 12/30/2017 for bronchopneumonia.  The patient   presented with some shortness of breath and cough.  Evaluation showed a left   lower lobe bronchopneumonia and possible right middle lobe infiltrate.  Treated   appropriately with antibiotics and she is following up on that issue today.  She   reports feeling quite good.  She denies any cough, shortness of breath, or   hemoptysis.  No fever, no chills, no general body aches.  No nausea, vomiting,   and no diarrhea.    CURRENT MEDICATIONS:  All medications noted and reviewed in the electronic   medical record medication list.    REVIEW OF SYSTEMS:  CONSTITUTIONAL:  No fever, no chills, no generalized body aches.  CARDIOVASCULAR:  No chest pain, no palpitations, no syncope.  GASTROINTESTINAL:  No nausea, vomiting, abdominal pain, or diarrhea.    PAST MEDICAL HISTORY, PAST SURGICAL HISTORY, FAMILY MEDICAL HISTORY, AND SOCIAL   HISTORY:  All noted and reviewed in the electronic medical record  history   sections.    PHYSICAL EXAMINATION:  GENERAL:  Pleasant, alert, appropriately groomed lady in no acute distress.  VITAL SIGNS:  All noted and reviewed as normal.  HEENT:  Mouth and pharynx normal.  No thrush.  NECK:  Supple.  No masses.  LUNGS:  Clear to auscultation and normal to percussion.  CARDIOVASCULAR:  Regular rate and rhythm.  No significant murmur.    LABORATORY DATA:  Reviewed the recent hospital discharge summary, reviewed the   radiographic images and report and recorded in managing.    IMPRESSION:  Community-acquired right lower lobe and also right middle lobe   bronchopneumonia, clinically resolved with antibiotic therapy.    PLAN:  1.  Repeat chest x-ray in one month.  2.  Advise if any recurring symptoms.      NOAH/SHONA  dd: 01/11/2018 11:46:23 (CST)  td: 01/12/2018 04:28:53 (CST)  Doc ID   #[id number]  Job ID #[id number]    CC:

## 2018-01-16 RX ORDER — ATORVASTATIN CALCIUM 20 MG/1
TABLET, FILM COATED ORAL
Qty: 90 TABLET | Refills: 0 | Status: SHIPPED | OUTPATIENT
Start: 2018-01-16 | End: 2018-04-16 | Stop reason: SDUPTHER

## 2018-01-16 RX ORDER — GLIMEPIRIDE 2 MG/1
TABLET ORAL
Qty: 90 TABLET | Refills: 0 | Status: SHIPPED | OUTPATIENT
Start: 2018-01-16 | End: 2018-04-16 | Stop reason: SDUPTHER

## 2018-01-16 RX ORDER — METFORMIN HYDROCHLORIDE 500 MG/1
TABLET, EXTENDED RELEASE ORAL
Qty: 90 TABLET | Refills: 4 | Status: SHIPPED | OUTPATIENT
Start: 2018-01-16 | End: 2019-03-13

## 2018-01-16 RX ORDER — LISINOPRIL AND HYDROCHLOROTHIAZIDE 10; 12.5 MG/1; MG/1
TABLET ORAL
Qty: 180 TABLET | Refills: 1 | Status: SHIPPED | OUTPATIENT
Start: 2018-01-16 | End: 2018-02-05 | Stop reason: ALTCHOICE

## 2018-01-16 RX ORDER — AMLODIPINE BESYLATE 5 MG/1
TABLET ORAL
Qty: 90 TABLET | Refills: 0 | Status: SHIPPED | OUTPATIENT
Start: 2018-01-16 | End: 2018-04-16 | Stop reason: SDUPTHER

## 2018-01-23 ENCOUNTER — LAB VISIT (OUTPATIENT)
Dept: LAB | Facility: HOSPITAL | Age: 83
End: 2018-01-23
Attending: INTERNAL MEDICINE
Payer: MEDICARE

## 2018-01-23 ENCOUNTER — OFFICE VISIT (OUTPATIENT)
Dept: ENDOCRINOLOGY | Facility: CLINIC | Age: 83
End: 2018-01-23
Payer: MEDICARE

## 2018-01-23 VITALS
HEIGHT: 65 IN | DIASTOLIC BLOOD PRESSURE: 80 MMHG | WEIGHT: 136 LBS | BODY MASS INDEX: 22.66 KG/M2 | SYSTOLIC BLOOD PRESSURE: 128 MMHG

## 2018-01-23 DIAGNOSIS — I15.2 HYPERTENSION ASSOCIATED WITH DIABETES: ICD-10-CM

## 2018-01-23 DIAGNOSIS — E11.59 HYPERTENSION ASSOCIATED WITH DIABETES: ICD-10-CM

## 2018-01-23 DIAGNOSIS — R80.9 TYPE 2 DIABETES MELLITUS WITH MICROALBUMINURIA, WITHOUT LONG-TERM CURRENT USE OF INSULIN: ICD-10-CM

## 2018-01-23 DIAGNOSIS — M81.0 SENILE OSTEOPOROSIS: Primary | ICD-10-CM

## 2018-01-23 DIAGNOSIS — E11.29 TYPE 2 DIABETES MELLITUS WITH MICROALBUMINURIA, WITHOUT LONG-TERM CURRENT USE OF INSULIN: ICD-10-CM

## 2018-01-23 DIAGNOSIS — M81.0 SENILE OSTEOPOROSIS: ICD-10-CM

## 2018-01-23 PROBLEM — E11.9 DIABETES MELLITUS WITHOUT COMPLICATION: Status: RESOLVED | Noted: 2017-11-09 | Resolved: 2018-01-23

## 2018-01-23 LAB
25(OH)D3+25(OH)D2 SERPL-MCNC: 58 NG/ML
ALBUMIN SERPL BCP-MCNC: 3.3 G/DL
ANION GAP SERPL CALC-SCNC: 10 MMOL/L
BUN SERPL-MCNC: 15 MG/DL
CALCIUM SERPL-MCNC: 10.6 MG/DL
CHLORIDE SERPL-SCNC: 104 MMOL/L
CO2 SERPL-SCNC: 29 MMOL/L
CREAT SERPL-MCNC: 1.2 MG/DL
EST. GFR  (AFRICAN AMERICAN): 49 ML/MIN/1.73 M^2
EST. GFR  (NON AFRICAN AMERICAN): 42.5 ML/MIN/1.73 M^2
GLUCOSE SERPL-MCNC: 161 MG/DL
PHOSPHATE SERPL-MCNC: 2.6 MG/DL
POTASSIUM SERPL-SCNC: 3.3 MMOL/L
PTH-INTACT SERPL-MCNC: 66 PG/ML
SODIUM SERPL-SCNC: 143 MMOL/L

## 2018-01-23 PROCEDURE — 83970 ASSAY OF PARATHORMONE: CPT

## 2018-01-23 PROCEDURE — 99999 PR PBB SHADOW E&M-EST. PATIENT-LVL III: CPT | Mod: PBBFAC,,, | Performed by: INTERNAL MEDICINE

## 2018-01-23 PROCEDURE — 84165 PROTEIN E-PHORESIS SERUM: CPT | Mod: 26,,, | Performed by: PATHOLOGY

## 2018-01-23 PROCEDURE — 99204 OFFICE O/P NEW MOD 45 MIN: CPT | Mod: S$PBB,,, | Performed by: INTERNAL MEDICINE

## 2018-01-23 PROCEDURE — 84165 PROTEIN E-PHORESIS SERUM: CPT

## 2018-01-23 PROCEDURE — 82306 VITAMIN D 25 HYDROXY: CPT

## 2018-01-23 PROCEDURE — 99213 OFFICE O/P EST LOW 20 MIN: CPT | Mod: PBBFAC | Performed by: INTERNAL MEDICINE

## 2018-01-23 PROCEDURE — 80069 RENAL FUNCTION PANEL: CPT

## 2018-01-23 PROCEDURE — 36415 COLL VENOUS BLD VENIPUNCTURE: CPT

## 2018-01-23 NOTE — PROGRESS NOTES
Subjective:      Patient ID: Sussy Costa is a 82 y.o. female.    Chief Complaint:  Osteoporosis    Referral from Dr. Casillas    History of Present Illness  Ms. Grayresents for evaluation and management of osteoporosis.     First diagnosed with osteoporosis in 2007    Current osteoporosis regimen:  Fosamax 70 mg weekly since 2009  Vitamin D 2000 units daily  Calcium 1200mg daily    Osteoporosis medication history:  Actonel for a few months prior to Fosamax but d/c'd due to gastric discomfort    Risk factors for osteoporosis  -Personal history of fracture: had a fracture of left 5th finger, no hx of fragility frax  -Family history of hip fracture or osteoporosis: mother had hip frax at age 92  -Premature/Early menopause: menopause at age 56, on HRT for a few months in the past  -Chronic steroid therapy: intermittently gets steroid taper for asthma  -History of rheumatoid arthritis: none  -Smoking History: never smoked  -Current EtOH use: social EtOH  -Fall Risk: good balance, has tripped and fallen twice in past 10-15 years    Risk for Secondary Osteoporosis:  -TSH: recent TSH WNL  -Calcium: calcium levels have been high normal to slightly elevated recently. PTH has been slightly elevated in the past. No history of renal stones.  -Vitamin D: no recent vitamin D on file  -Anemia and renal insufficiency: some anemia, no CKD  -Signs/symptoms of malabsorption: none  -PPI use: none  -Diabetes: Last A1c at goal   -Chronic liver disease: none  -Hx gastric bypass surgery: none    Height loss:  1/2 inch height loss    Exercise:  No formal exercise    Pending dental work:  None    Last Bone Density Scan dated 11/13/2017:    BONE MINERAL DENSITY RESULTS:  Lumbar Spine: Lumbar bone mineral density L1-L4 is 0.870g/cm2, which is a t-score of -1.6. The z-score is 1.1.    Total Hip: The total hip bone mineral density is 0.629g/cm2.  The t-score is -2.6, and the z-score is -0.5.  Femoral neck BMD is 0.554g/cm2 and the t-score is  -2.7.    COMPARISONS:  Date Location BMD T-score  05/28/15 L-spine 0.875 -1.6  Total Hip 0.707 -1.9   Impression       Osteoporosis of the femoral neck and total hip. There is a significant decline in BMD at the total hip (-11%) when compared to previous study.   No change at the lumbar spine.      Diabetes reasonably controlled on metformin and glimepiride. UTD with urine micro and eye exam. Has mild nephropathy and on ACEI.      Review of Systems   Constitutional: Negative for unexpected weight change.   HENT: Negative for voice change.    Eyes: Negative for visual disturbance.   Respiratory: Negative for shortness of breath.    Cardiovascular: Negative for chest pain.   Gastrointestinal: Negative for abdominal pain.   Endocrine: Negative for cold intolerance.   Genitourinary: Negative for frequency.   Musculoskeletal: Negative for myalgias.   Skin: Negative for rash.       Objective:   Physical Exam   Constitutional: She is oriented to person, place, and time. She appears well-developed and well-nourished.   HENT:   Head: Normocephalic and atraumatic.   Right Ear: External ear normal.   Left Ear: External ear normal.   Nose: Nose normal.   Neck: No tracheal deviation present. No thyromegaly present.   Cardiovascular: Normal rate, regular rhythm and normal heart sounds.    No edema   Pulmonary/Chest: Effort normal and breath sounds normal.   Abdominal: Soft. Bowel sounds are normal. There is no tenderness.   Musculoskeletal:   Normal gait, no cyanosis or clubbing   Neurological: She is alert and oriented to person, place, and time. She has normal reflexes.   Vibration sense intact   Skin: Skin is warm and dry. No rash noted.   No nodules, no ulcers   Psychiatric: She has a normal mood and affect. Judgment normal.   Vitals reviewed.      Lab Review:   Results for NINFA ORTEGA (MRN [id number]) as of 1/23/2018 16:49   Ref. Range 12/30/2017 04:03   Sodium Latest Ref Range: 136 - 145 mmol/L 147 (H)   Potassium Latest  Ref Range: 3.5 - 5.1 mmol/L 3.8   Chloride Latest Ref Range: 95 - 110 mmol/L 110   CO2 Latest Ref Range: 23 - 29 mmol/L 32 (H)   Anion Gap Latest Ref Range: 8 - 16 mmol/L 5 (L)   BUN, Bld Latest Ref Range: 8 - 23 mg/dL 28 (H)   Creatinine Latest Ref Range: 0.5 - 1.4 mg/dL 0.8   eGFR if non African American Latest Ref Range: >60 mL/min/1.73 m^2 >60.0   eGFR if African American Latest Ref Range: >60 mL/min/1.73 m^2 >60.0   Glucose Latest Ref Range: 70 - 110 mg/dL 139 (H)   Calcium Latest Ref Range: 8.7 - 10.5 mg/dL 8.6 (L)   Phosphorus Latest Ref Range: 2.7 - 4.5 mg/dL 2.3 (L)   Magnesium Latest Ref Range: 1.6 - 2.6 mg/dL 2.0   Albumin Latest Ref Range: 3.5 - 5.2 g/dL 2.4 (L)     Results for NINFA ORTEGA (MRN [id number]) as of 1/23/2018 16:49   Ref. Range 8/18/2016 07:25 4/25/2017 10:47 11/2/2017 08:55 12/27/2017 09:21   Hemoglobin A1C Latest Ref Range: 4.0 - 5.6 %  6.7 (H) 6.8 (H) 7.0 (H)   Estimated Avg Glucose Latest Ref Range: 68 - 131 mg/dL  146 (H) 148 (H) 154 (H)   TSH Latest Ref Range: 0.400 - 4.000 uIU/mL 4.846 (H)  2.275    Free T4 Latest Ref Range: 0.71 - 1.51 ng/dL 1.00        Results for NINFA ORTEGA (MRN [id number]) as of 1/23/2018 16:49   Ref. Range 1/14/2009 09:27 4/8/2009 08:14 4/8/2010 07:55 12/14/2010 07:29   PTH Latest Ref Range: 10.0 - 65.0 pg/ml 108 (H) 88 (H) 69 (H) 65         Assessment:     1. Senile osteoporosis    2. Type 2 diabetes mellitus with microalbuminuria, without long-term current use of insulin    3. Hypertension associated with diabetes      Plan:     --Patient with postmenopausal osteoporosis  --Risk factors include: age, post menopausal status, and fam hx of hip fracture  --Will evaluate for secondary causes: SPEP, Vit D, pth, renal function panel  --Had failure of therapy on Fosamax with decrease in bone density despite taking medication properly  --If secondary w/u negative will start Prolia 60 mg q6 months  --Reviewed benefits/risks of Prolia and she wishes to proceed with  treatment (no pending dental work)  --DM well controlled on current regimen  --Bp at goal on current regimen, on ACEI  --Repeat bone density 11/2019    RTC in 1 year    Copy to Dr. Vinny Martines M.D. Staff Endocrinology

## 2018-01-23 NOTE — LETTER
January 23, 2018      PAULA Casillas MD  [address] [facility] [place]  [place] [address]           [facility] Endo/Diab/Metab  [address]  [facility] [address]  Phone: [phone]  Fax: [phone]          Patient: Sussy Costa   MR Number: [id number]   YOB: 1935   Date of Visit: 1/23/2018       Dear Dr. PAULA Casillas:    Thank you for referring Sussy Costa to me for evaluation. Attached you will find relevant portions of my assessment and plan of care.    If you have questions, please do not hesitate to call me. I look forward to following Sussy Costa along with you.    Sincerely,    Joshua Martines MD    Enclosure  CC:  No Recipients    If you would like to receive this communication electronically, please contact [email or web address] or [phone] to request more information on [employer] Link access.    For providers and/or their staff who would like to refer a patient to [facility], please contact us through our one-stop-shop provider referral line, [facility], at [phone].    If you feel you have received this communication in error or would no longer like to receive these types of communications, please e-mail [email or web address]

## 2018-01-24 LAB
ALBUMIN SERPL ELPH-MCNC: 3.49 G/DL
ALPHA1 GLOB SERPL ELPH-MCNC: 0.35 G/DL
ALPHA2 GLOB SERPL ELPH-MCNC: 1.04 G/DL
B-GLOBULIN SERPL ELPH-MCNC: 0.89 G/DL
GAMMA GLOB SERPL ELPH-MCNC: 0.73 G/DL
PATHOLOGIST INTERPRETATION SPE: NORMAL
PROT SERPL-MCNC: 6.5 G/DL

## 2018-02-05 ENCOUNTER — TELEPHONE (OUTPATIENT)
Dept: ENDOCRINOLOGY | Facility: CLINIC | Age: 83
End: 2018-02-05

## 2018-02-05 ENCOUNTER — TELEPHONE (OUTPATIENT)
Dept: INTERNAL MEDICINE | Facility: CLINIC | Age: 83
End: 2018-02-05

## 2018-02-05 DIAGNOSIS — E83.52 HYPERCALCEMIA: Primary | ICD-10-CM

## 2018-02-05 RX ORDER — LISINOPRIL 10 MG/1
10 TABLET ORAL DAILY
Qty: 90 TABLET | Refills: 3 | Status: SHIPPED | OUTPATIENT
Start: 2018-02-05 | End: 2018-02-21 | Stop reason: ALTCHOICE

## 2018-02-05 NOTE — TELEPHONE ENCOUNTER
Advise pt we will need to adjust her BP meds a bit due to her calcium and potassium. We will need to d/c the lisinopril-hctz preparation and change to the lisinopril only --new rx sent--see me in about one month to f/u on BP

## 2018-02-05 NOTE — TELEPHONE ENCOUNTER
Called patient regarding results. Has hypercalcemia and hypokalemia. With low phos and and high normal PTH she could have primary hyperparathyroidism. Discussed case with Dr. Casillas and he will stop HCTZ and monitor b.p. Will start Prolia now. Will plan to repeat PTH, renal panel and Vitamin D, and check 24 hour urine for calcium and creatinine after she is off of HCTZ for 3 months.

## 2018-02-06 NOTE — TELEPHONE ENCOUNTER
Spoke to patient and let her know that she can  a 24 hour jug at our office and to let us know the night before she is going to drop off the jug so that we can schedule labs for her that day.

## 2018-02-07 ENCOUNTER — TELEPHONE (OUTPATIENT)
Dept: ENDOCRINOLOGY | Facility: CLINIC | Age: 83
End: 2018-02-07

## 2018-02-07 ENCOUNTER — PATIENT MESSAGE (OUTPATIENT)
Dept: ENDOCRINOLOGY | Facility: CLINIC | Age: 83
End: 2018-02-07

## 2018-02-07 NOTE — TELEPHONE ENCOUNTER
----- Message from Maria Eugenia Angela sent at 2/7/2018  1:22 PM CST -----  Contact: Sussy tel:  [phone]   PT.says she did get the medication and does understand your msg.   And thank you.

## 2018-02-07 NOTE — TELEPHONE ENCOUNTER
Spoke to patient and she did receive her Rx and she is going to wait to do her 24 hour urine test for 3 months.

## 2018-02-08 ENCOUNTER — OFFICE VISIT (OUTPATIENT)
Dept: OTOLARYNGOLOGY | Facility: CLINIC | Age: 83
End: 2018-02-08
Payer: MEDICARE

## 2018-02-08 VITALS
HEART RATE: 82 BPM | BODY MASS INDEX: 23.12 KG/M2 | HEIGHT: 65 IN | TEMPERATURE: 98 F | WEIGHT: 138.75 LBS | SYSTOLIC BLOOD PRESSURE: 129 MMHG | DIASTOLIC BLOOD PRESSURE: 73 MMHG

## 2018-02-08 DIAGNOSIS — J30.89 CHRONIC NON-SEASONAL ALLERGIC RHINITIS, UNSPECIFIED TRIGGER: ICD-10-CM

## 2018-02-08 DIAGNOSIS — J33.8 POLYP OF PARANASAL SINUS: ICD-10-CM

## 2018-02-08 DIAGNOSIS — J32.8 OTHER CHRONIC SINUSITIS: Primary | ICD-10-CM

## 2018-02-08 PROCEDURE — 99999 PR PBB SHADOW E&M-EST. PATIENT-LVL IV: CPT | Mod: PBBFAC,,, | Performed by: OTOLARYNGOLOGY

## 2018-02-08 PROCEDURE — 1159F MED LIST DOCD IN RCRD: CPT | Mod: ,,, | Performed by: OTOLARYNGOLOGY

## 2018-02-08 PROCEDURE — 99214 OFFICE O/P EST MOD 30 MIN: CPT | Mod: S$PBB,,, | Performed by: OTOLARYNGOLOGY

## 2018-02-08 PROCEDURE — 1126F AMNT PAIN NOTED NONE PRSNT: CPT | Mod: ,,, | Performed by: OTOLARYNGOLOGY

## 2018-02-08 PROCEDURE — 99214 OFFICE O/P EST MOD 30 MIN: CPT | Mod: PBBFAC,PO | Performed by: OTOLARYNGOLOGY

## 2018-02-08 RX ORDER — CICLESONIDE 50 UG/1
2 SPRAY NASAL DAILY
Qty: 12.5 G | Refills: 6 | Status: SHIPPED | OUTPATIENT
Start: 2018-02-08 | End: 2018-08-30 | Stop reason: SDUPTHER

## 2018-02-08 NOTE — PROGRESS NOTES
Chief Complaint   Patient presents with    Follow-up     sinusitis   .    HPI:     Sussy Costa is a 82 y.o. female who is known to me from my previous practice with chronic sinusitis with polyposis and AR presents for evaluation of a 10 day  history of nasal congestion and postnasal drip. She describes difficulty breathing at night. There is bilateral nasal obstruction. She does use sinus rinses or nasal sprays. She admits to midface pain and pressure and along the frontal sinus region.  She admits to rhinorrhea and postnasal drip. There is not maxillary tooth pain. She  admits to headaches.  She has had sinus or nasal surgery. There is no history of sinonasal trauma. She reports prior to this she had a URI and feels like the symptoms have progressed to include sinus pressure and pain.      Interval HPI: She reports that she was admitted to the hospital 12/27/2017 with pneumonia and has since been feeling better. She was discharged on Avelox and Prednisone. She will follow up with Dr. Casillas for repeat chest x-ray soon. She reports that she is not having any nasal congestion, facial pain or pressure, rhinorrhea, or post-nasal drip at present.  She using nasal rinses.  She continues on Singulair, flonase      Past Medical History:   Diagnosis Date    Asthma     Cyst of pancreas     liver    Diabetes mellitus type II     Eczema of hand     Glaucoma     Hyperlipidemia     Hypertension     Lichen planus     gums    Osteoporosis     Pulmonary nodules      Social History     Social History    Marital status:      Spouse name: sania    Number of children: 1    Years of education: N/A     Occupational History          Social History Main Topics    Smoking status: Never Smoker    Smokeless tobacco: Never Used    Alcohol use Yes      Comment: socially    Drug use: No    Sexual activity: Yes     Partners: Male     Birth control/ protection: Post-menopausal     Other Topics Concern     Not on file     Social History Narrative    She does not exercise regularly.     Past Surgical History:   Procedure Laterality Date    CATARACT EXTRACTION, BILATERAL      CHOLECYSTECTOMY      HYSTERECTOMY      nasal polyps       Family History   Problem Relation Age of Onset    Heart disease Father     Heart disease Brother     Hypertension Brother            Review of Systems  General: negative for chills, fever or weight loss  Psychological: negative for mood changes or depression  Ophthalmic: negative for blurry vision, photophobia or eye pain  ENT: see HPI  Respiratory: no cough, shortness of breath, or wheezing  Cardiovascular: no chest pain or dyspnea on exertion  Gastrointestinal: no abdominal pain, change in bowel habits, or black/ bloody stools  Musculoskeletal: negative for gait disturbance or muscular weakness  Neurological: no syncope or seizures; no ataxia  Dermatological: negative for puritis,  rash and jaundice  Hematologic/lymphatic: no easy bruising, no new lumps or bumps      Physical Exam:    Vitals:    02/08/18 1319   BP: 129/73   Pulse: 82   Temp: 97.8 °F (36.6 °C)       Constitutional: Well appearing / communicating without difficutly.  NAD.  Eyes: EOM I Bilaterally  Head/Face: Normocephalic.  Negative paranasal sinus pressure/tenderness.  Salivary glands WNL.  House Brackmann I Bilaterally.    Right Ear: Auricle normal appearance. External Auditory Canal within normal limits,TM w/o masses/lesions/perforations. TM mobility noted.   Left Ear: Auricle normal appearance. External Auditory Canal WNL,TM w/o masses/lesions/perforations. TM mobility noted.  Nose: No gross nasal septal deviation. Inferior Turbinates edematous 3+ bilaterally. No septal perforation. No masses/lesions. External nasal skin appears normal without masses/lesions.  Oral Cavity: Gingiva/lips within normal limits.  Dentition/gingiva healthy appearing. Mucus membranes moist. Floor of mouth soft, no masses palpated.  Oral Tongue mobile. Hard Palate appears normal.    Oropharynx: Base of tongue appears normal. No masses/lesions noted. Tonsillar fossa/pharyngeal wall without lesions. Posterior oropharynx WNL.  Soft palate without masses. Midline uvula.   Neck/Lymphatic: No LAD I-VI bilaterally.  No thyromegaly.  No masses noted on exam.    Mirror laryngoscopy/nasopharyngoscopy: Active gag reflex.  Unable to perform.    Neuro/Psychiatric: AOx3.  Normal mood and affect.   Cardiovascular: Normal carotid pulses bilaterally, no increasing jugular venous distention noted at cervical region bilaterally.    Respiratory: Normal respiratory effort, no stridor, no retractions noted.        Assessment:    ICD-10-CM ICD-9-CM    1. Other chronic sinusitis J32.8 473.8    2. Polyp of paranasal sinus J33.8 471.8    3. Chronic non-seasonal allergic rhinitis, unspecified trigger J30.89 477.9      The primary encounter diagnosis was Other chronic sinusitis. Diagnoses of Polyp of paranasal sinus and Chronic non-seasonal allergic rhinitis, unspecified trigger were also pertinent to this visit.      Plan:     No evidence of recurrent polyps. Continue nasal saline saline irrigations. Continue Singulair and Flonase OTC. Refills given. Follow up in 6 mos for recheck.     .Rosangela Isabel MD

## 2018-02-12 ENCOUNTER — HOSPITAL ENCOUNTER (OUTPATIENT)
Dept: RADIOLOGY | Facility: HOSPITAL | Age: 83
Discharge: HOME | End: 2018-02-12
Attending: INTERNAL MEDICINE
Payer: MEDICARE

## 2018-02-12 DIAGNOSIS — J18.9 PNEUMONIA OF LEFT LOWER LOBE DUE TO INFECTIOUS ORGANISM: ICD-10-CM

## 2018-02-12 PROCEDURE — 71046 X-RAY EXAM CHEST 2 VIEWS: CPT | Mod: TC,PO

## 2018-02-12 PROCEDURE — 71046 X-RAY EXAM CHEST 2 VIEWS: CPT | Mod: 26,,, | Performed by: RADIOLOGY

## 2018-02-14 ENCOUNTER — INFUSION (OUTPATIENT)
Dept: INFECTIOUS DISEASES | Facility: HOSPITAL | Age: 83
End: 2018-02-14
Attending: INTERNAL MEDICINE
Payer: MEDICARE

## 2018-02-14 VITALS — HEIGHT: 65 IN | BODY MASS INDEX: 23.13 KG/M2 | WEIGHT: 138.81 LBS

## 2018-02-14 DIAGNOSIS — M81.0 SENILE OSTEOPOROSIS: Primary | ICD-10-CM

## 2018-02-14 PROCEDURE — 63600175 PHARM REV CODE 636 W HCPCS: Mod: JG | Performed by: INTERNAL MEDICINE

## 2018-02-14 PROCEDURE — 96372 THER/PROPH/DIAG INJ SC/IM: CPT

## 2018-02-14 RX ADMIN — DENOSUMAB 60 MG: 60 INJECTION SUBCUTANEOUS at 09:02

## 2018-02-20 ENCOUNTER — TELEPHONE (OUTPATIENT)
Dept: ENDOCRINOLOGY | Facility: CLINIC | Age: 83
End: 2018-02-20

## 2018-02-20 NOTE — TELEPHONE ENCOUNTER
Called and spoke with patient.  Patient explains that her left foot and leg is more swollen then the right with the dark red discolorations.  Patient explains that, this has happened before but, she did not know since she just had her Prolia on Feb. 14, 2018.  I explained to patient that I will forward this message to Dr. Martines.  I recommended for patient to use her compression stockings and keep feet elevated higher level than heart level.  Patient verbalizes understanding.  She can be reached at [phone].

## 2018-02-20 NOTE — TELEPHONE ENCOUNTER
Spoke with pt who advises that she has been taking the new dose of her medication.    She has noted that she has had swollen ankles and legs.    Pt has been elevating her legs and states that the swelling only goes down a little and she has noted some pain to her ankles.    Please advise.    Thanks.

## 2018-02-20 NOTE — TELEPHONE ENCOUNTER
----- Message from Alcira Milton sent at 2/20/2018  8:11 AM CST -----  Contact: Slef [phone]  PT states she has swollen feet & what looks to be a red rashes on both legs (No itching) after her prolia injection. She can be reached at [phone].

## 2018-02-20 NOTE — TELEPHONE ENCOUNTER
I would like to see her in office to check the swelling and bp before making other changes . We had advised f/u on recent message

## 2018-02-21 ENCOUNTER — OFFICE VISIT (OUTPATIENT)
Dept: INTERNAL MEDICINE | Facility: CLINIC | Age: 83
End: 2018-02-21
Payer: MEDICARE

## 2018-02-21 ENCOUNTER — HOSPITAL ENCOUNTER (OUTPATIENT)
Dept: RADIOLOGY | Facility: HOSPITAL | Age: 83
Discharge: HOME | End: 2018-02-21
Attending: INTERNAL MEDICINE
Payer: MEDICARE

## 2018-02-21 ENCOUNTER — TELEPHONE (OUTPATIENT)
Dept: INTERNAL MEDICINE | Facility: CLINIC | Age: 83
End: 2018-02-21

## 2018-02-21 VITALS
OXYGEN SATURATION: 96 % | SYSTOLIC BLOOD PRESSURE: 130 MMHG | BODY MASS INDEX: 22.99 KG/M2 | TEMPERATURE: 98 F | DIASTOLIC BLOOD PRESSURE: 65 MMHG | WEIGHT: 138 LBS | HEIGHT: 65 IN | RESPIRATION RATE: 20 BRPM | HEART RATE: 82 BPM

## 2018-02-21 DIAGNOSIS — R60.0 BILATERAL LOWER EXTREMITY EDEMA: Primary | ICD-10-CM

## 2018-02-21 DIAGNOSIS — R60.0 BILATERAL LOWER EXTREMITY EDEMA: ICD-10-CM

## 2018-02-21 DIAGNOSIS — I10 HYPERTENSION, UNSPECIFIED TYPE: ICD-10-CM

## 2018-02-21 DIAGNOSIS — Z87.01 HISTORY OF PNEUMONIA: ICD-10-CM

## 2018-02-21 PROBLEM — J96.01 ACUTE RESPIRATORY FAILURE WITH HYPOXIA: Status: RESOLVED | Noted: 2017-12-29 | Resolved: 2018-02-21

## 2018-02-21 PROCEDURE — 99215 OFFICE O/P EST HI 40 MIN: CPT | Mod: PBBFAC,PO

## 2018-02-21 PROCEDURE — 99214 OFFICE O/P EST MOD 30 MIN: CPT | Mod: S$PBB,,, | Performed by: INTERNAL MEDICINE

## 2018-02-21 PROCEDURE — 1159F MED LIST DOCD IN RCRD: CPT | Mod: ,,, | Performed by: INTERNAL MEDICINE

## 2018-02-21 PROCEDURE — 71046 X-RAY EXAM CHEST 2 VIEWS: CPT | Mod: TC,PO

## 2018-02-21 PROCEDURE — 99999 PR PBB SHADOW E&M-EST. PATIENT-LVL V: CPT | Mod: PBBFAC,,,

## 2018-02-21 PROCEDURE — 71046 X-RAY EXAM CHEST 2 VIEWS: CPT | Mod: 26,,, | Performed by: RADIOLOGY

## 2018-02-21 RX ORDER — FUROSEMIDE 20 MG/1
TABLET ORAL
Qty: 15 TABLET | Refills: 2 | Status: SHIPPED | OUTPATIENT
Start: 2018-02-21 | End: 2018-04-01 | Stop reason: SDUPTHER

## 2018-02-21 NOTE — PROGRESS NOTES
Subjective:       Patient ID: Sussy Costa is a 82 y.o. female.    Chief Complaint: Leg Swelling; Wheezing; and Shortness of Breath    Ms Costa is an 83 yo F with HPLD, asthma,recent  history of (CAP) pneumonia, HTN, DM2, OA who presents with a chief complaint of bilateral lower extremity swelling/ rash and wheezing that began the night of 2/18 and her wheezing has acutely worsened over the past day. She is able to lay flat in bed and denies PND. She reports dyspnea on exertion. She denies rhinorrhea, fever, chills, productive cough. She takes Zyrtec daily and home Advair scheduled and PRN albuterol for rescue. She denies any abrupt airway tightness or hypotension associated with the Prolia. She was hospitalized in December 2017 with CAP treated with IV antibiotics and is s/p curative course, last chest imaging showing improvement on 1/11/18. She has using her home albuterol twice daily this week. Recently, her HCTZ was discontinued secondary to hyperglycemia/ hypokalemia and she was put on ACEi. She has leg swelling L> R, and prior venous duplex scans have been negative for DVT.       Review of Systems   Constitutional: Negative for activity change, appetite change, chills, fatigue and unexpected weight change.   HENT: Negative for congestion, postnasal drip, rhinorrhea and sore throat.    Eyes: Negative for visual disturbance.   Respiratory: Positive for shortness of breath and wheezing. Negative for apnea, cough and chest tightness.    Cardiovascular: Positive for leg swelling. Negative for chest pain and palpitations.   Gastrointestinal: Negative for abdominal pain, constipation, nausea and rectal pain.   Endocrine: Negative for cold intolerance and heat intolerance.   Genitourinary: Negative for decreased urine volume, difficulty urinating and frequency.   Musculoskeletal: Positive for joint swelling. Negative for arthralgias, gait problem and neck pain.   Skin: Positive for rash. Negative for color change  "and pallor.   Allergic/Immunologic: Positive for environmental allergies.   Neurological: Negative for dizziness, syncope and light-headedness.   Hematological: Negative for adenopathy.   Psychiatric/Behavioral: Negative for agitation and dysphoric mood.       Objective:     /65 (BP Location: Right arm, Patient Position: Sitting, BP Method: Medium (Automatic))   Pulse 82   Temp 98.2 °F (36.8 °C) (Oral)   Resp 20   Ht 5' 5" (1.651 m)   Wt 62.6 kg (138 lb 0.1 oz)   SpO2 96%   BMI 22.97 kg/m²     Physical Exam   Constitutional: She is oriented to person, place, and time. No distress.   HENT:   Nose: Nose normal.   Mouth/Throat: Oropharynx is clear and moist. No oropharyngeal exudate.   Eyes: EOM are normal. Pupils are equal, round, and reactive to light. Right eye exhibits no discharge. Left eye exhibits no discharge. No scleral icterus.   Neck: Normal range of motion. No JVD present. No tracheal deviation present.   Cardiovascular: Normal rate, regular rhythm, normal heart sounds and intact distal pulses.  Exam reveals no gallop and no friction rub.    No murmur heard.  Pulmonary/Chest: Effort normal. No respiratory distress. She has wheezes. She has no rales. She exhibits no tenderness.   LLL focal wheeze, coarse sounds   Abdominal: Soft. Bowel sounds are normal. She exhibits no distension and no mass. There is no tenderness. There is no rebound and no guarding.   Musculoskeletal: Normal range of motion. She exhibits edema. She exhibits no tenderness or deformity.   Erythematous rash on b/l ankles   Neurological: She is alert and oriented to person, place, and time. She displays normal reflexes. No cranial nerve deficit or sensory deficit.   Skin: Skin is warm and dry. Capillary refill takes less than 2 seconds. No rash noted. She is not diaphoretic. No erythema. No pallor.   Psychiatric: She has a normal mood and affect.       Assessment:       1. Bilateral lower extremity edema    2. History of " pneumonia    3. Hypertension, unspecified type        Plan:       1. Bilateral lower extremity edema  Differential to include cardiogenic w/ HCTZ held vs iatrogenic (CCB) vs nephrogenic. Will work up to include:     - Brain natriuretic peptide; Future  - Comprehensive metabolic panel; Future  - X-Ray Chest PA And Lateral; Future  -began Lasix 20mg QOD; will discontinue lisinopril and discuss plan after CMP/ Cr result 02/22/18      2. History of pneumonia    - X-Ray Chest PA And Lateral; Future to look for interval change. No acute worsening noted 02/21/2018      3. HTN  -will optimize regimen to include: amlodipine, discontinuing ACEi- will check electrolytes/ Cr today. Began low dose 20mg furosemide QOD    Seb Diez MD  PGY-2 House Staff, Internal Medicine     Staff Note:  I have seen and examined Ms Costa and agree with resident Dr Diez's evaluation and plan.  CXR today unremarkable.  Begin low dose lasix for her LE edema.  Review labs 2/22/18 and contact patient with additional recommendations.  EUN Casillas MD

## 2018-02-21 NOTE — TELEPHONE ENCOUNTER
Spoke with pt who advises that she was advised by Dr. Garcia to f/u with PCP.    Was able to offer an appt today at 2 pm with Brown and resident.    Pt agreeable to such.    Verbalized understanding of date time and location of appt.

## 2018-02-21 NOTE — TELEPHONE ENCOUNTER
Called and spoke with patient.  Patient explains that- she did contact her PCP and they were supposed to put in lab orders and have her get her blood pressure checked.  I do not see any orders at this time.  Patient advised to call Dr. Casillas's office again and let them know the details of her symptoms as per Dr. Martines.  Patient verbalizes understanding.

## 2018-02-21 NOTE — TELEPHONE ENCOUNTER
Pt is scheduled with PCP today at 2 pm.    She verbalized understanding of date, time, and location of the appt.

## 2018-02-21 NOTE — TELEPHONE ENCOUNTER
I think that this is less likely from the Prolia. Looks like she had swelling in the same extremity in December before she took the Prolia and had a lower ext ultrasound that was negative for a blood clot. If this continues I recommend that she contact her PCP.

## 2018-02-21 NOTE — TELEPHONE ENCOUNTER
----- Message from Dulce Arora sent at 2/21/2018 10:14 AM CST -----  Contact: Self/[phone]  Sooner appointment than the  can schedule.  Did you offer to schedule the next available appointment and put the patient on the wait list?:    When is the first available appointment:   What is the nature of the appointment: legs and feet swelling  What visit type: UC  Patient preference of timeframe to be scheduled:    Comments:

## 2018-02-22 ENCOUNTER — TELEPHONE (OUTPATIENT)
Dept: INTERNAL MEDICINE | Facility: CLINIC | Age: 83
End: 2018-02-22

## 2018-02-22 DIAGNOSIS — T50.1X5A ADVERSE EFFECT OF LOOP DIURETIC, INITIAL ENCOUNTER: Primary | ICD-10-CM

## 2018-02-22 NOTE — TELEPHONE ENCOUNTER
Attempted to call Ms Costa x2 to reports on results of BNP/CMP. Cr is normal, K stable. Would like to order BMP/Mg for 2 weeks now that she is on Lasix. Would like to schedule clinic follow up 4 weeks.     Seb Diez MD  PGY-2 House Staff, Internal Medicine

## 2018-02-23 ENCOUNTER — TELEPHONE (OUTPATIENT)
Dept: INTERNAL MEDICINE | Facility: CLINIC | Age: 83
End: 2018-02-23

## 2018-02-23 DIAGNOSIS — R60.0 EDEMA OF BOTH LEGS: Primary | ICD-10-CM

## 2018-02-23 NOTE — TELEPHONE ENCOUNTER
----- Message from Delia Taveras sent at 2/23/2018  3:43 PM CST -----  Contact: Home: [phone]   Call to let her know if she need labs.

## 2018-02-26 ENCOUNTER — TELEPHONE (OUTPATIENT)
Dept: INTERNAL MEDICINE | Facility: CLINIC | Age: 83
End: 2018-02-26

## 2018-02-26 NOTE — TELEPHONE ENCOUNTER
Spoke with pt to advise of lab results and MD recommendations as noted by Dr. SONJA Diez (resident).    Verbalized understanding and appts have been scheduled and mailed to the pt.

## 2018-02-26 NOTE — TELEPHONE ENCOUNTER
----- Message from Windy Mendenhall sent at 2/26/2018  9:57 AM CST -----  Contact: self/[phone]  Pt is calling to speak with someone in the office. She states that she called in on Friday and nobody ever returned the call. Please advise.    Thank You

## 2018-03-12 ENCOUNTER — LAB VISIT (OUTPATIENT)
Dept: LAB | Facility: HOSPITAL | Age: 83
End: 2018-03-12
Attending: INTERNAL MEDICINE
Payer: MEDICARE

## 2018-03-12 DIAGNOSIS — T50.1X5A ADVERSE EFFECT OF LOOP DIURETIC, INITIAL ENCOUNTER: ICD-10-CM

## 2018-03-12 LAB
ANION GAP SERPL CALC-SCNC: 8 MMOL/L
BUN SERPL-MCNC: 9 MG/DL
CALCIUM SERPL-MCNC: 9.6 MG/DL
CHLORIDE SERPL-SCNC: 109 MMOL/L
CO2 SERPL-SCNC: 27 MMOL/L
CREAT SERPL-MCNC: 0.9 MG/DL
EST. GFR  (AFRICAN AMERICAN): >60 ML/MIN/1.73 M^2
EST. GFR  (NON AFRICAN AMERICAN): >60 ML/MIN/1.73 M^2
GLUCOSE SERPL-MCNC: 216 MG/DL
MAGNESIUM SERPL-MCNC: 1.5 MG/DL
POTASSIUM SERPL-SCNC: 3.8 MMOL/L
SODIUM SERPL-SCNC: 144 MMOL/L

## 2018-03-12 PROCEDURE — 80048 BASIC METABOLIC PNL TOTAL CA: CPT

## 2018-03-12 PROCEDURE — 36415 COLL VENOUS BLD VENIPUNCTURE: CPT | Mod: PO

## 2018-03-12 PROCEDURE — 83735 ASSAY OF MAGNESIUM: CPT

## 2018-03-28 ENCOUNTER — OFFICE VISIT (OUTPATIENT)
Dept: INTERNAL MEDICINE | Facility: CLINIC | Age: 83
End: 2018-03-28
Payer: MEDICARE

## 2018-03-28 VITALS
HEART RATE: 80 BPM | BODY MASS INDEX: 22.81 KG/M2 | WEIGHT: 136.88 LBS | HEIGHT: 65 IN | DIASTOLIC BLOOD PRESSURE: 76 MMHG | TEMPERATURE: 98 F | RESPIRATION RATE: 18 BRPM | SYSTOLIC BLOOD PRESSURE: 118 MMHG

## 2018-03-28 DIAGNOSIS — E11.59 HYPERTENSION ASSOCIATED WITH DIABETES: ICD-10-CM

## 2018-03-28 DIAGNOSIS — R06.02 SOB (SHORTNESS OF BREATH) ON EXERTION: ICD-10-CM

## 2018-03-28 DIAGNOSIS — I15.2 HYPERTENSION ASSOCIATED WITH DIABETES: ICD-10-CM

## 2018-03-28 DIAGNOSIS — R60.0 BILATERAL LEG EDEMA: Primary | ICD-10-CM

## 2018-03-28 PROCEDURE — 99214 OFFICE O/P EST MOD 30 MIN: CPT | Mod: S$PBB,,, | Performed by: INTERNAL MEDICINE

## 2018-03-28 PROCEDURE — 99213 OFFICE O/P EST LOW 20 MIN: CPT | Mod: PBBFAC,PO | Performed by: INTERNAL MEDICINE

## 2018-03-28 PROCEDURE — 99999 PR PBB SHADOW E&M-EST. PATIENT-LVL III: CPT | Mod: PBBFAC,,, | Performed by: INTERNAL MEDICINE

## 2018-04-01 RX ORDER — FUROSEMIDE 20 MG/1
20 TABLET ORAL DAILY
Qty: 30 TABLET | Refills: 2
Start: 2018-03-28 | End: 2018-09-21

## 2018-04-02 ENCOUNTER — CLINICAL SUPPORT (OUTPATIENT)
Dept: CARDIOLOGY | Facility: CLINIC | Age: 83
End: 2018-04-02
Attending: INTERNAL MEDICINE
Payer: MEDICARE

## 2018-04-02 DIAGNOSIS — R60.0 BILATERAL LEG EDEMA: ICD-10-CM

## 2018-04-02 DIAGNOSIS — I35.9 NONRHEUMATIC AORTIC VALVE DISORDER: ICD-10-CM

## 2018-04-02 LAB
AORTIC VALVE STENOSIS: NORMAL
ESTIMATED PA SYSTOLIC PRESSURE: 39.48
MITRAL VALVE MOBILITY: NORMAL
MITRAL VALVE REGURGITATION: NORMAL
MITRAL VALVE STENOSIS: NORMAL
RETIRED EF AND QEF - SEE NOTES: 65 (ref 55–65)
TRICUSPID VALVE REGURGITATION: NORMAL

## 2018-04-02 PROCEDURE — 93306 TTE W/DOPPLER COMPLETE: CPT | Mod: PBBFAC,PO | Performed by: INTERNAL MEDICINE

## 2018-04-02 NOTE — PROGRESS NOTES
This office note has been dictated.  HISTORY OF PRESENT ILLNESS:  This is an 82-year-old lady following up on several   issues today.  We saw her last month with some issues of lower extremity edema   and some exertional shortness of breath.  She had been recently treated for   pneumonia back in September and that had cleared clinically.  She does have some   reactive airway disease and is using her maintenance inhalers.  We had   restarted her on diuretic therapy at 20 mg every other day.  Last month, she   returns indicating she feels about the same.  It is documented that she is 2 to   3 pounds less than last month.  She is not having any chest pain.  No fever, no   chills.  No significant cough.    CURRENT MEDICATIONS:  All medications are noted and reviewed in the electronic   medical record medication list.    REVIEW OF SYSTEMS:  CONSTITUTIONAL:  No fever, no chills.  GASTROINTESTINAL:  No nausea, vomiting, abdominal pain or diarrhea.  GENITOURINARY:  No dysuria, frequency or change in color or character of her   urine.    PAST MEDICAL, PAST SURGICAL HISTORY, FAMILY MEDICAL HISTORY AND SOCIAL HISTORY:    All noted and reviewed in the electronic medical record history sections.    PHYSICAL EXAMINATION:  GENERAL:  Alert, pleasant and appropriately groomed lady, in no acute distress.  VITAL SIGNS:  Blood pressure 116/76, taken manually by this examiner.  Other   vital signs are noted and reviewed.  EYES:  Sclerae white, nonicteric.  HEENT:  Normocephalic.  NECK:  Supple.  No masses, no thyromegaly.  LUNGS:  Clear to auscultation and normal to percussion.  CARDIOVASCULAR:  Regular rate and rhythm.  No significant murmur.  Carotids are   full bilaterally without bruits.  No JVD.  EXTREMITIES:  There is 1+ distal lower extremity edema.  MENTAL STATUS:  Alert and oriented.  Affect and mood are all appropriate.    DATA:  We reviewed her lab data from last month.  Her BNP was mildly elevated at    107.    IMPRESSION:  1.  Hypertension, reasonably controlled.  2.  Reactive airway disease.  3.  Lower extremity edema is probably multifactorial - medication, possibly some   volume overload.  4.  Reactive airway disease.  5.  Diabetes mellitus.    PLAN:  1.  Increase Lasix to 20 mg on a daily basis.  2.  A 2D echo with color flow Doppler study.  3.  Return to clinic in four to six weeks.      KELL  dd: 04/01/2018 22:57:19 (CDT)  td: 04/02/2018 13:04:14 (CDT)  Doc ID   #[id number]  Job ID #[id number]    CC:

## 2018-04-16 RX ORDER — AMLODIPINE BESYLATE 5 MG/1
5 TABLET ORAL DAILY
Qty: 90 TABLET | Refills: 3 | Status: SHIPPED | OUTPATIENT
Start: 2018-04-16 | End: 2018-06-06 | Stop reason: ALTCHOICE

## 2018-04-16 RX ORDER — ATORVASTATIN CALCIUM 20 MG/1
TABLET, FILM COATED ORAL
Qty: 90 TABLET | Refills: 3 | Status: SHIPPED | OUTPATIENT
Start: 2018-04-16 | End: 2019-05-22 | Stop reason: SDUPTHER

## 2018-04-16 RX ORDER — GLIMEPIRIDE 2 MG/1
TABLET ORAL
Qty: 90 TABLET | Refills: 3 | Status: SHIPPED | OUTPATIENT
Start: 2018-04-16 | End: 2019-03-01 | Stop reason: SDUPTHER

## 2018-04-16 RX ORDER — MONTELUKAST SODIUM 10 MG/1
10 TABLET ORAL NIGHTLY
Qty: 90 TABLET | Refills: 3 | Status: SHIPPED | OUTPATIENT
Start: 2018-04-16 | End: 2019-05-26 | Stop reason: SDUPTHER

## 2018-04-24 ENCOUNTER — TELEPHONE (OUTPATIENT)
Dept: INTERNAL MEDICINE | Facility: CLINIC | Age: 83
End: 2018-04-24

## 2018-04-24 NOTE — TELEPHONE ENCOUNTER
----- Message from Shirin Nova sent at 4/24/2018  8:38 AM CDT -----  Contact: pt [phone]  Patient would like to get test results.  Name of test (lab, mammo, etc.):  Eko gram  Date of test:  4/2/2018  Ordering provider: Vinny  Where was the test performed:  [facility]  Comments:

## 2018-04-24 NOTE — TELEPHONE ENCOUNTER
Advise the cardiac echo looks good overall. No heart failure,  Recent labs show magnesium just a bit low. Advise take otc mag oxide 1 qd.  How are her legs doing.

## 2018-04-25 NOTE — TELEPHONE ENCOUNTER
Spoke with pt to advise of results and MD recommendations.    Verbalized understanding.    1. States that her legs are not good and she still has pain in her bilat ankles with flexing and extending her feet. And a little pain with walking.    2. She cut her leg on a metal leaf of a wall decoration.  She cleaned with area with peroxide and triple ab ointment.  Area is a little red around the wound. Denies any oozing blood or pus.    Please review and advise.    Thanks.

## 2018-05-01 ENCOUNTER — CLINICAL SUPPORT (OUTPATIENT)
Dept: OTOLARYNGOLOGY | Facility: CLINIC | Age: 83
End: 2018-05-01
Payer: MEDICARE

## 2018-05-01 ENCOUNTER — TELEPHONE (OUTPATIENT)
Dept: ENDOCRINOLOGY | Facility: CLINIC | Age: 83
End: 2018-05-01

## 2018-05-01 ENCOUNTER — OFFICE VISIT (OUTPATIENT)
Dept: OTOLARYNGOLOGY | Facility: CLINIC | Age: 83
End: 2018-05-01
Payer: MEDICARE

## 2018-05-01 VITALS
SYSTOLIC BLOOD PRESSURE: 125 MMHG | HEART RATE: 74 BPM | WEIGHT: 137.88 LBS | BODY MASS INDEX: 22.95 KG/M2 | TEMPERATURE: 97 F | DIASTOLIC BLOOD PRESSURE: 72 MMHG

## 2018-05-01 DIAGNOSIS — H90.A31 MIXED CONDUCTIVE AND SENSORINEURAL HEARING LOSS OF RIGHT EAR WITH RESTRICTED HEARING OF LEFT EAR: ICD-10-CM

## 2018-05-01 DIAGNOSIS — J30.89 NON-SEASONAL ALLERGIC RHINITIS, UNSPECIFIED TRIGGER: ICD-10-CM

## 2018-05-01 DIAGNOSIS — H90.A31 MIXED CONDUCTIVE AND SENSORINEURAL HEARING LOSS OF RIGHT EAR WITH RESTRICTED HEARING OF LEFT EAR: Primary | ICD-10-CM

## 2018-05-01 DIAGNOSIS — H65.91 RIGHT OTITIS MEDIA WITH EFFUSION: Primary | ICD-10-CM

## 2018-05-01 DIAGNOSIS — H69.93 ETD (EUSTACHIAN TUBE DYSFUNCTION), BILATERAL: ICD-10-CM

## 2018-05-01 DIAGNOSIS — J32.8 OTHER CHRONIC SINUSITIS: ICD-10-CM

## 2018-05-01 DIAGNOSIS — H90.A22 SENSORINEURAL HEARING LOSS (SNHL) OF LEFT EAR WITH RESTRICTED HEARING OF RIGHT EAR: ICD-10-CM

## 2018-05-01 DIAGNOSIS — J33.8 POLYP OF PARANASAL SINUS: ICD-10-CM

## 2018-05-01 PROCEDURE — 99214 OFFICE O/P EST MOD 30 MIN: CPT | Mod: S$PBB,,, | Performed by: OTOLARYNGOLOGY

## 2018-05-01 PROCEDURE — 99213 OFFICE O/P EST LOW 20 MIN: CPT | Mod: PBBFAC,PO,25 | Performed by: OTOLARYNGOLOGY

## 2018-05-01 PROCEDURE — 99999 PR PBB SHADOW E&M-EST. PATIENT-LVL III: CPT | Mod: PBBFAC,,, | Performed by: OTOLARYNGOLOGY

## 2018-05-01 PROCEDURE — 92567 TYMPANOMETRY: CPT | Mod: PBBFAC,PO | Performed by: AUDIOLOGIST-HEARING AID FITTER

## 2018-05-01 PROCEDURE — 92557 COMPREHENSIVE HEARING TEST: CPT | Mod: PBBFAC,PO | Performed by: AUDIOLOGIST-HEARING AID FITTER

## 2018-05-01 RX ORDER — LISINOPRIL 10 MG/1
20 TABLET ORAL DAILY
COMMUNITY
End: 2018-06-06

## 2018-05-01 RX ORDER — MAGNESIUM 30 MG
1 TABLET ORAL DAILY
Status: ON HOLD | COMMUNITY
End: 2020-09-30 | Stop reason: CLARIF

## 2018-05-01 RX ORDER — METHYLPREDNISOLONE 4 MG/1
TABLET ORAL
Qty: 1 PACKAGE | Refills: 0 | Status: SHIPPED | OUTPATIENT
Start: 2018-05-01 | End: 2018-06-15

## 2018-05-01 RX ORDER — CIPROFLOXACIN 500 MG/1
500 TABLET ORAL 2 TIMES DAILY
Qty: 28 TABLET | Refills: 0 | Status: SHIPPED | OUTPATIENT
Start: 2018-05-01 | End: 2018-05-15

## 2018-05-01 NOTE — PROGRESS NOTES
Chief Complaint   Patient presents with    Otalgia     also feelis like it is stopped up--RIGHT ear   .    HPI:     Sussy Costa is a 82 y.o. female who is known to me from my previous practice with chronic sinusitis who presents today with right otalgia for 2 weeks. She reports that the pain in mild in nature and describes it as a sharp intermittent pain.  She states it is gradually worsening.  She notes right aural fullness with muffled hearing in the right ear only.  She reports that she is not having any nasal congestion, facial pain or pressure, rhinorrhea, or post-nasal drip at present.  She using nasal rinses.  She continues on Singulair, Omnaris.       Past Medical History:   Diagnosis Date    Asthma     Cyst of pancreas     liver    Diabetes mellitus type II     Eczema of hand     Glaucoma     Hyperlipidemia     Hypertension     Lichen planus     gums    Osteoporosis     Pulmonary nodules      Social History     Social History    Marital status:      Spouse name: sania    Number of children: 1    Years of education: N/A     Occupational History          Social History Main Topics    Smoking status: Never Smoker    Smokeless tobacco: Never Used    Alcohol use Yes      Comment: socially    Drug use: No    Sexual activity: Yes     Partners: Male     Birth control/ protection: Post-menopausal     Other Topics Concern    Not on file     Social History Narrative    She does not exercise regularly.     Past Surgical History:   Procedure Laterality Date    CATARACT EXTRACTION, BILATERAL      CHOLECYSTECTOMY      HYSTERECTOMY      nasal polyps       Family History   Problem Relation Age of Onset    Heart disease Father     Heart disease Brother     Hypertension Brother            Review of Systems  General: negative for chills, fever or weight loss  Psychological: negative for mood changes or depression  Ophthalmic: negative for blurry vision, photophobia or eye  pain  ENT: see HPI  Respiratory: no cough, shortness of breath, or wheezing  Cardiovascular: no chest pain or dyspnea on exertion  Gastrointestinal: no abdominal pain, change in bowel habits, or black/ bloody stools  Musculoskeletal: negative for gait disturbance or muscular weakness  Neurological: no syncope or seizures; no ataxia  Dermatological: negative for puritis,  rash and jaundice  Hematologic/lymphatic: no easy bruising, no new lumps or bumps      Physical Exam:    Vitals:    05/01/18 0854   BP: 125/72   Pulse: 74   Temp: 97.3 °F (36.3 °C)       Constitutional: Well appearing / communicating without difficutly.  NAD.  Eyes: EOM I Bilaterally  Head/Face: Normocephalic.  Negative paranasal sinus pressure/tenderness.  Salivary glands WNL.  House Brackmann I Bilaterally.    Right Ear: Auricle normal appearance. External Auditory Canal within normal limits,TM w/ effusion present. TM mobility limited.    Left Ear: Auricle normal appearance. External Auditory Canal WNL,TM retracted. TM mobility limited.  Nose: No gross nasal septal deviation. Inferior Turbinates edematous 3+ bilaterally. No septal perforation. No masses/lesions. External nasal skin appears normal without masses/lesions.  Oral Cavity: Gingiva/lips within normal limits.  Dentition/gingiva healthy appearing. Mucus membranes moist. Floor of mouth soft, no masses palpated. Oral Tongue mobile. Hard Palate appears normal.    Oropharynx: Base of tongue appears normal. No masses/lesions noted. Tonsillar fossa/pharyngeal wall without lesions. Posterior oropharynx WNL.  Soft palate without masses. Midline uvula.   Neck/Lymphatic: No LAD I-VI bilaterally.  No thyromegaly.  No masses noted on exam.    Mirror laryngoscopy/nasopharyngoscopy: Active gag reflex.  Unable to perform.    Neuro/Psychiatric: AOx3.  Normal mood and affect.   Cardiovascular: Normal carotid pulses bilaterally, no increasing jugular venous distention noted at cervical region bilaterally.     Respiratory: Normal respiratory effort, no stridor, no retractions noted.      Diagnostic testing reviewed:   Audiogram reviewed personally by myself and in detail with the patient today.  Findings include:  moderate sloping to profound mixed hearing loss in the right ear and a mild to profound sensorineural hearing loss in her left ear.      Assessment:    ICD-10-CM ICD-9-CM    1. Right otitis media with effusion H65.91 381.4    2. ETD (Eustachian tube dysfunction), bilateral H69.83 381.81    3. Mixed conductive and sensorineural hearing loss of right ear with restricted hearing of left ear H90.A31 389.22    4. Other chronic sinusitis J32.8 473.8    5. Non-seasonal allergic rhinitis, unspecified trigger J30.89 477.8    6. Polyp of paranasal sinus J33.8 471.8      The primary encounter diagnosis was Right otitis media with effusion. Diagnoses of ETD (Eustachian tube dysfunction), bilateral, Mixed conductive and sensorineural hearing loss of right ear with restricted hearing of left ear, Other chronic sinusitis, Non-seasonal allergic rhinitis, unspecified trigger, and Polyp of paranasal sinus were also pertinent to this visit.      Plan:    OME: Cipro 500mg PO BID for 10 days. Medrol dose diego.  We had a long discussion regarding the anatomy and function of the eustachian tube.  We discussed that the eustachian tube acts as a pump to keep the appropriate amount of pressure behind the ear drum.  I think this is the underlying cause of the OME.  Continue Omnaris, singulair, and nasal saline irrigations.   Follow up in 2 weeks with audiogram.    .Rosangela Isabel MD

## 2018-05-01 NOTE — PROGRESS NOTES
Varsha Gallegos, F-AAA  [facility] [address]  [address]  [place] [address]      Patient: Sussy Costa   MRN: [id number]   : 1935  OLIVERA: 2018    AUDIOLOGICAL EVALUATION    CASE HISTORY:    Sussy Costa, 82 y.o., was seen on the above date for an audiological evaluation. Patient reported her right ear to be muffled. No further history significant to hearing loss was reported.    TEST RESULTS:   Otoscopy was unremarkable for both ears. Imittance testing revealed stiff middle ear compliance (Type B) with normal volume measurements in her right ear and excessive negative middle ear pressure (Type C) in her left ear. Speech reception thresholds were obtained at 50 dB HL and 35 dB HL, in the right and left ears, respectively, which was consistent with pure tone results. A word recognition score of 84% was obtained in the right ear using a presentation level of 90 dBHL. A left ear word recognition score of 88% correct was obtained using a presentation level of 75 dBHL.     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    RECOMMENDATIONS:   It is recommended that she:  Follow-up with physician to assess suspected middle ear pathology.  Hearing test following otologic intervention per physician's request.    If you should have any questions or concerns regarding the above information, please do not hesitate to contact me at [phone].      _______________________________  Aniya Gallegos, CCC-A  Clinical Audiologist    enclosure: audiogram

## 2018-05-01 NOTE — TELEPHONE ENCOUNTER
----- Message from Desi Chacon sent at 5/1/2018  2:46 PM CDT -----  .Needs Medical Advice    Who Called: Self  Symptoms (please be specific): Last  Prolia injestion, her legs got swollen & had to take fluid pills to reduce the swelling. Does not want to get another wants to now an alternative.  How long has patient had these symptoms:    Pharmacy name and phone # if needed:    Communication Preference (Mary Annt response to Pt. (or) Call Back # and timeframe):  [phone]  Additional Information:

## 2018-05-04 NOTE — TELEPHONE ENCOUNTER
Please advise patient that the alternative would be Reclast which is similar to the Fosamax she was taking by mouth. Except that it is an infusion that is given once per year. I would prefer the Reclast infusion as she had bone loss while on the Fosamax and this was the reason we switched to Prolia.

## 2018-05-28 ENCOUNTER — LAB VISIT (OUTPATIENT)
Dept: LAB | Facility: HOSPITAL | Age: 83
End: 2018-05-28
Attending: INTERNAL MEDICINE
Payer: MEDICARE

## 2018-05-28 DIAGNOSIS — E83.52 HYPERCALCEMIA: ICD-10-CM

## 2018-05-28 LAB
25(OH)D3+25(OH)D2 SERPL-MCNC: 56 NG/ML
ALBUMIN SERPL BCP-MCNC: 3.4 G/DL
ANION GAP SERPL CALC-SCNC: 8 MMOL/L
BUN SERPL-MCNC: 14 MG/DL
CALCIUM SERPL-MCNC: 9.9 MG/DL
CHLORIDE SERPL-SCNC: 107 MMOL/L
CO2 SERPL-SCNC: 27 MMOL/L
CREAT SERPL-MCNC: 0.9 MG/DL
EST. GFR  (AFRICAN AMERICAN): >60 ML/MIN/1.73 M^2
EST. GFR  (NON AFRICAN AMERICAN): >60 ML/MIN/1.73 M^2
GLUCOSE SERPL-MCNC: 168 MG/DL
PHOSPHATE SERPL-MCNC: 2.7 MG/DL
POTASSIUM SERPL-SCNC: 3.8 MMOL/L
PTH-INTACT SERPL-MCNC: 108 PG/ML
SODIUM SERPL-SCNC: 142 MMOL/L

## 2018-05-28 PROCEDURE — 83970 ASSAY OF PARATHORMONE: CPT

## 2018-05-28 PROCEDURE — 80069 RENAL FUNCTION PANEL: CPT

## 2018-05-28 PROCEDURE — 82306 VITAMIN D 25 HYDROXY: CPT

## 2018-05-28 PROCEDURE — 36415 COLL VENOUS BLD VENIPUNCTURE: CPT

## 2018-06-05 ENCOUNTER — TELEPHONE (OUTPATIENT)
Dept: ENDOCRINOLOGY | Facility: CLINIC | Age: 83
End: 2018-06-05

## 2018-06-05 NOTE — TELEPHONE ENCOUNTER
Has questions regarding prolia.   Developed myalgias following prolia. Unclear if related.   Wants other option, next injection 8/2018

## 2018-06-05 NOTE — TELEPHONE ENCOUNTER
Please advise patient that I reviewed her labs and the urine test that she did. The studies are most consistent with a condition called primary hyperparathyroidism. There is no specific intervention that needs to be done for this at this time other than treating the osteoporosis with medication as we previously discussed. We will need to monitor these labs going forward.

## 2018-06-05 NOTE — TELEPHONE ENCOUNTER
Spoke to patient and let her know that Dr Martines reviewed her labs and the urine test that she did. The studies are most consistent with a condition called primary hyperparathyroidism. There is no specific intervention that needs to be done for this at this time other than treating the osteoporosis with medication as Dr Martines previously discussed. Dr Martines   will need to monitor these labs going forward.  Patient said she has a lot of questions about the Prolia and wants to speak to the Dr on call she can not wait till Dr Martines gets back on Tuesday.

## 2018-06-06 ENCOUNTER — OFFICE VISIT (OUTPATIENT)
Dept: INTERNAL MEDICINE | Facility: CLINIC | Age: 83
End: 2018-06-06
Payer: MEDICARE

## 2018-06-06 VITALS
TEMPERATURE: 98 F | SYSTOLIC BLOOD PRESSURE: 110 MMHG | HEIGHT: 65 IN | WEIGHT: 136.25 LBS | HEART RATE: 88 BPM | DIASTOLIC BLOOD PRESSURE: 64 MMHG | BODY MASS INDEX: 22.7 KG/M2

## 2018-06-06 DIAGNOSIS — E11.59 HYPERTENSION ASSOCIATED WITH DIABETES: Primary | ICD-10-CM

## 2018-06-06 DIAGNOSIS — R60.0 EDEMA OF BOTH LEGS: ICD-10-CM

## 2018-06-06 DIAGNOSIS — I15.2 HYPERTENSION ASSOCIATED WITH DIABETES: Primary | ICD-10-CM

## 2018-06-06 DIAGNOSIS — E11.9 DIABETES MELLITUS WITHOUT COMPLICATION: ICD-10-CM

## 2018-06-06 PROCEDURE — 99999 PR PBB SHADOW E&M-EST. PATIENT-LVL III: CPT | Mod: PBBFAC,,, | Performed by: INTERNAL MEDICINE

## 2018-06-06 PROCEDURE — 99214 OFFICE O/P EST MOD 30 MIN: CPT | Mod: S$PBB,,, | Performed by: INTERNAL MEDICINE

## 2018-06-06 PROCEDURE — 99213 OFFICE O/P EST LOW 20 MIN: CPT | Mod: PBBFAC,PO | Performed by: INTERNAL MEDICINE

## 2018-06-06 RX ORDER — LISINOPRIL 10 MG/1
20 TABLET ORAL DAILY
Qty: 180 TABLET | Refills: 3
Start: 2018-06-06 | End: 2018-07-12 | Stop reason: SDUPTHER

## 2018-06-06 NOTE — PROGRESS NOTES
This office note has been dictated.  HISTORY OF PRESENT ILLNESS:  This is an 82-year-old lady with hypertension,   diabetes mellitus, dyslipidemia, asthma, following up on a couple of issues   today.  We have recently been seeing her for her hypertension and lower   extremity edema.  She has now been on Lasix daily for the last month or so 20 mg   daily.  She does report some improvement in her swelling, but some persistent.    She also continues to be bothered by some slight discomfort in the anterior   distal aspect of her lower legs with some slight redness.  She denies any CHF   symptomatology.  Asthma is relatively stable.    CURRENT MEDICATIONS:  All medications are noted and reviewed in the electronic   medical record medication list.    REVIEW OF SYSTEMS:  CONSTITUTIONAL:  No fever, chills or generalized body aches.  CARDIOVASCULAR:  No chest pain, palpitations or syncope.  RESPIRATORY:  See HPI regarding lower extremities.  GASTROINTESTINAL:  No nausea, vomiting, abdominal pain or diarrhea.  GENITOURINARY:  No dysuria, frequency, change in color or character of her   urine.    PAST MEDICAL HISTORY, PAST SURGICAL HISTORY, FAMILY MEDICAL HISTORY AND SOCIAL   HISTORY:  All noted and reviewed in the electronic medical record history   sections.    PHYSICAL EXAMINATION:  GENERAL:  Pleasant, alert, appropriately groomed lady in no acute distress.  VITAL SIGNS:  Blood pressure taken manually by this examiner is 110/64.  Other   vital signs noted and reviewed as normal.  HEENT:  Normocephalic.  NECK:  Supple.  No masses, no thyromegaly.  LUNGS:  Clear to auscultation.  CARDIOVASCULAR:  Regular rate and rhythm, 1/6 systolic murmur.  No JVD.  There   is trace distal lower extremity edema.  2+ pedal pulses.  Slight redness in the   distal aspect anteromedial lower leg.  No breaks in the skin.  INTEGUMENT.  No bilirubin, etc.    LABORATORY DATA:  Recent 2D echo reviewed.  Recent laboratory data reviewed with   a BNP in  February of 107 at the peak of her edema.    IMPRESSION:  1.  Lower extremity edema, multifactorial.  Suspect some venous insufficiency,   calcium channel blocker therapy.  No evidence of CHF, renal disease or other.  2.  Hypertension, well controlled.  3.  Diabetes mellitus.    PLAN:  1.  We will execute a trial of discontinuing her amlodipine and observe the   lower extremity symptomatology.  2.  We will increase her lisinopril to 20 mg daily.  3.  Continue other medications.  4.  Return to clinic in four to six weeks for followup.      NOAH/SHONA  dd: 06/06/2018 18:03:25 (CDT)  td: 06/07/2018 13:24:08 (CDT)  Doc ID   #[id number]  Job ID #[id number]    CC:

## 2018-06-14 ENCOUNTER — TELEPHONE (OUTPATIENT)
Dept: ENDOCRINOLOGY | Facility: CLINIC | Age: 83
End: 2018-06-14

## 2018-06-14 NOTE — TELEPHONE ENCOUNTER
----- Message from Christa Flores sent at 6/14/2018  2:11 PM CDT -----  Contact: self [phone]  .Needs Advice    Reason for call:      Communication Preference: call back   Additional Information:  Pt states she needs an apt to see  she states she also has questions about her shots

## 2018-06-14 NOTE — TELEPHONE ENCOUNTER
Called patient and she reports developing significant lower ext edema, ankle pain and heel pain which developed a few days after the Prolia injection. The signs and symptoms have improved but are still present. I recommend that she not get another Prolia injection. I will plan to see her back in clinic in August/Sept when the Prolia will be out of her system and we will likely start weekly Actonel at that time.

## 2018-06-15 ENCOUNTER — CLINICAL SUPPORT (OUTPATIENT)
Dept: OTOLARYNGOLOGY | Facility: CLINIC | Age: 83
End: 2018-06-15
Payer: MEDICARE

## 2018-06-15 ENCOUNTER — OFFICE VISIT (OUTPATIENT)
Dept: OTOLARYNGOLOGY | Facility: CLINIC | Age: 83
End: 2018-06-15
Payer: MEDICARE

## 2018-06-15 VITALS
WEIGHT: 138.75 LBS | DIASTOLIC BLOOD PRESSURE: 73 MMHG | HEIGHT: 65 IN | SYSTOLIC BLOOD PRESSURE: 128 MMHG | BODY MASS INDEX: 23.12 KG/M2 | HEART RATE: 72 BPM

## 2018-06-15 DIAGNOSIS — H65.91 RIGHT OTITIS MEDIA WITH EFFUSION: Primary | ICD-10-CM

## 2018-06-15 DIAGNOSIS — J30.89 NON-SEASONAL ALLERGIC RHINITIS, UNSPECIFIED TRIGGER: ICD-10-CM

## 2018-06-15 DIAGNOSIS — J32.8 OTHER CHRONIC SINUSITIS: ICD-10-CM

## 2018-06-15 DIAGNOSIS — H90.3 SENSORINEURAL HEARING LOSS, BILATERAL: Primary | ICD-10-CM

## 2018-06-15 DIAGNOSIS — H69.93 ETD (EUSTACHIAN TUBE DYSFUNCTION), BILATERAL: ICD-10-CM

## 2018-06-15 DIAGNOSIS — H90.A22 SENSORINEURAL HEARING LOSS (SNHL) OF LEFT EAR WITH RESTRICTED HEARING OF RIGHT EAR: ICD-10-CM

## 2018-06-15 PROCEDURE — 92567 TYMPANOMETRY: CPT | Mod: PBBFAC,PO | Performed by: AUDIOLOGIST-HEARING AID FITTER

## 2018-06-15 PROCEDURE — 99211 OFF/OP EST MAY X REQ PHY/QHP: CPT | Mod: PBBFAC,27,PO | Performed by: AUDIOLOGIST-HEARING AID FITTER

## 2018-06-15 PROCEDURE — 99999 PR PBB SHADOW E&M-EST. PATIENT-LVL III: CPT | Mod: PBBFAC,,, | Performed by: OTOLARYNGOLOGY

## 2018-06-15 PROCEDURE — 92553 AUDIOMETRY AIR & BONE: CPT | Mod: PBBFAC,PO | Performed by: AUDIOLOGIST-HEARING AID FITTER

## 2018-06-15 PROCEDURE — 99213 OFFICE O/P EST LOW 20 MIN: CPT | Mod: PBBFAC,PO,25 | Performed by: OTOLARYNGOLOGY

## 2018-06-15 PROCEDURE — 99213 OFFICE O/P EST LOW 20 MIN: CPT | Mod: S$PBB,,, | Performed by: OTOLARYNGOLOGY

## 2018-06-15 PROCEDURE — 99999 PR PBB SHADOW E&M-EST. PATIENT-LVL I: CPT | Mod: PBBFAC,,, | Performed by: AUDIOLOGIST-HEARING AID FITTER

## 2018-06-15 NOTE — PROGRESS NOTES
Chief Complaint   Patient presents with    Hearing Loss     improved since last visit   .    HPI:     Sussy Costa is a 82 y.o. female who is known to me from my previous practice with chronic sinusitis who presents today with right otalgia for 2 weeks. She reports that the pain in mild in nature and describes it as a sharp intermittent pain.  She states it is gradually worsening.  She notes right aural fullness with muffled hearing in the right ear only.  She reports that she is not having any nasal congestion, facial pain or pressure, rhinorrhea, or post-nasal drip at present.  She using nasal rinses.  She continues on Singulair, Omnaris.       Past Medical History:   Diagnosis Date    Asthma     Cyst of pancreas     liver    Diabetes mellitus type II     Eczema of hand     Glaucoma     Hyperlipidemia     Hypertension     Lichen planus     gums    Osteoporosis     Pulmonary nodules      Social History     Social History    Marital status:      Spouse name: sania    Number of children: 1    Years of education: N/A     Occupational History          Social History Main Topics    Smoking status: Never Smoker    Smokeless tobacco: Never Used    Alcohol use Yes      Comment: socially    Drug use: No    Sexual activity: Yes     Partners: Male     Birth control/ protection: Post-menopausal     Other Topics Concern    Not on file     Social History Narrative    She does not exercise regularly.     Past Surgical History:   Procedure Laterality Date    CATARACT EXTRACTION, BILATERAL      CHOLECYSTECTOMY      HYSTERECTOMY      nasal polyps       Family History   Problem Relation Age of Onset    Heart disease Father     Heart disease Brother     Hypertension Brother            Review of Systems  General: negative for chills, fever or weight loss  Psychological: negative for mood changes or depression  Ophthalmic: negative for blurry vision, photophobia or eye pain  ENT: see  HPI  Respiratory: no cough, shortness of breath, or wheezing  Cardiovascular: no chest pain or dyspnea on exertion  Gastrointestinal: no abdominal pain, change in bowel habits, or black/ bloody stools  Musculoskeletal: negative for gait disturbance or muscular weakness  Neurological: no syncope or seizures; no ataxia  Dermatological: negative for puritis,  rash and jaundice  Hematologic/lymphatic: no easy bruising, no new lumps or bumps      Physical Exam:    Vitals:    06/15/18 0942   BP: 128/73   Pulse: 72       Constitutional: Well appearing / communicating without difficutly.  NAD.  Eyes: EOM I Bilaterally  Head/Face: Normocephalic.  Negative paranasal sinus pressure/tenderness.  Salivary glands WNL.  House Brackmann I Bilaterally.    Right Ear: Auricle normal appearance. External Auditory Canal within normal limits,TM:  effusion resolved. TM mobility limited.    Left Ear: Auricle normal appearance. External Auditory Canal WNL,TM retracted. TM mobility limited.  Nose: No gross nasal septal deviation. Inferior Turbinates edematous 3+ bilaterally. No septal perforation. No masses/lesions. External nasal skin appears normal without masses/lesions.  Oral Cavity: Gingiva/lips within normal limits.  Dentition/gingiva healthy appearing. Mucus membranes moist. Floor of mouth soft, no masses palpated. Oral Tongue mobile. Hard Palate appears normal.    Oropharynx: Base of tongue appears normal. No masses/lesions noted. Tonsillar fossa/pharyngeal wall without lesions. Posterior oropharynx WNL.  Soft palate without masses. Midline uvula.   Neck/Lymphatic: No LAD I-VI bilaterally.  No thyromegaly.  No masses noted on exam.    Mirror laryngoscopy/nasopharyngoscopy: Active gag reflex.  Unable to perform.    Neuro/Psychiatric: AOx3.  Normal mood and affect.   Cardiovascular: Normal carotid pulses bilaterally, no increasing jugular venous distention noted at cervical region bilaterally.    Respiratory: Normal respiratory effort,  no stridor, no retractions noted.      Diagnostic testing reviewed:   Audiogram reviewed personally by myself and in detail with the patient today.                Assessment:    ICD-10-CM ICD-9-CM    1. Right otitis media with effusion H65.91 381.4    2. Other chronic sinusitis J32.8 473.8    3. ETD (Eustachian tube dysfunction), bilateral H69.83 381.81    4. Non-seasonal allergic rhinitis, unspecified trigger J30.89 477.8    5. Sensorineural hearing loss (SNHL) of left ear with restricted hearing of right ear H90.A22 389.22      The primary encounter diagnosis was Right otitis media with effusion. Diagnoses of Other chronic sinusitis, ETD (Eustachian tube dysfunction), bilateral, Non-seasonal allergic rhinitis, unspecified trigger, and Sensorineural hearing loss (SNHL) of left ear with restricted hearing of right ear were also pertinent to this visit.      Plan:    OME: Resolved.     Continue Omnaris, singulair, and nasal saline irrigations for CRS/AR/ETD.     Follow up in 4-6 months.    .Rosangela Isabel MD

## 2018-06-15 NOTE — PROGRESS NOTES
Varsha Gallegos, CCC-A  [facility] [address]  [address]  [place] [address]      Patient: Sussy Costa   MRN: [id number]  : 1935  OLIVERA: 06/15/2018       AUDIOLOGICAL EVALUATION    CASE HISTORY:    Sussy Costa, 82 y.o., was seen on the above date for a repeat hearing evaluation. Patient reported improvement with her right ear. 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  No further history significant to hearing loss was reported.    TEST RESULTS:    Otoscopy was unremarkable for both ears. Imittance testing revealed excessive negative middle ear pressure (Type C) in her right ear and normal middle ear compliance (Type A) in her left ear. Pure tone testing indicated that Sussy Costa has a mild steeply sloping to profound at 6K Hz sensorineural hearing loss in both ears.    RECOMMENDATIONS:   It is recommended that she:  Continue to receive audiological monitoring annually.  Follow up per physician's reqest.  Consider a trial with amplification in the near future.     If you should have any questions or concerns regarding the above information, please do not hesitate to contact me at [phone].      _______________________________  Aniya Gallegos, CCC-A  Clinical Audiologist    enclosure: audiogram

## 2018-06-21 RX ORDER — FUROSEMIDE 20 MG/1
TABLET ORAL
Qty: 15 TABLET | Refills: 1 | Status: SHIPPED | OUTPATIENT
Start: 2018-06-21 | End: 2019-04-26

## 2018-07-12 RX ORDER — LISINOPRIL 10 MG/1
20 TABLET ORAL DAILY
Qty: 180 TABLET | Refills: 3 | Status: SHIPPED | OUTPATIENT
Start: 2018-07-12 | End: 2018-10-12

## 2018-07-12 NOTE — TELEPHONE ENCOUNTER
"----- Message from Tricia De La Cruz sent at 7/12/2018 10:33 AM CDT -----  Contact: Self [phone]  RX request - refill or new RX.  Is this a refill or new RX:  refill  RX name and strength: lisinopril 10 MG tablet  Directions:   Is this a 30 day or 90 day RX:    Local pharmacy or mail order pharmacy:  Local Pharmacy   Pharmacy name and phone # (DON'T enter "on file" or "in chart"): [id number] [address] [phone] (Phone)  [phone] (Fax)    Comments:  Patient states her pharmacy need new script stating that she supposed to take 2 pills every day.         "

## 2018-08-15 ENCOUNTER — OFFICE VISIT (OUTPATIENT)
Dept: PODIATRY | Facility: CLINIC | Age: 83
End: 2018-08-15
Payer: MEDICARE

## 2018-08-15 VITALS
HEIGHT: 65 IN | WEIGHT: 138 LBS | DIASTOLIC BLOOD PRESSURE: 76 MMHG | BODY MASS INDEX: 22.99 KG/M2 | HEART RATE: 76 BPM | SYSTOLIC BLOOD PRESSURE: 168 MMHG

## 2018-08-15 DIAGNOSIS — B35.1 ONYCHOMYCOSIS DUE TO DERMATOPHYTE: ICD-10-CM

## 2018-08-15 DIAGNOSIS — E11.8 TYPE 2 DIABETES MELLITUS WITH COMPLICATION, UNSPECIFIED WHETHER LONG TERM INSULIN USE: Primary | ICD-10-CM

## 2018-08-15 PROCEDURE — 99213 OFFICE O/P EST LOW 20 MIN: CPT | Mod: S$PBB,25,, | Performed by: PODIATRIST

## 2018-08-15 PROCEDURE — 99999 PR PBB SHADOW E&M-EST. PATIENT-LVL IV: CPT | Mod: PBBFAC,,, | Performed by: PODIATRIST

## 2018-08-15 PROCEDURE — 11721 DEBRIDE NAIL 6 OR MORE: CPT | Mod: Q8,PBBFAC,PO | Performed by: PODIATRIST

## 2018-08-15 PROCEDURE — 11721 DEBRIDE NAIL 6 OR MORE: CPT | Mod: Q8,S$PBB,, | Performed by: PODIATRIST

## 2018-08-15 PROCEDURE — 99214 OFFICE O/P EST MOD 30 MIN: CPT | Mod: PBBFAC,PO | Performed by: PODIATRIST

## 2018-08-16 ENCOUNTER — TELEPHONE (OUTPATIENT)
Dept: INTERNAL MEDICINE | Facility: CLINIC | Age: 83
End: 2018-08-16

## 2018-08-16 DIAGNOSIS — Z12.31 VISIT FOR SCREENING MAMMOGRAM: Primary | ICD-10-CM

## 2018-08-16 NOTE — TELEPHONE ENCOUNTER
----- Message from Tricia De La Cruz sent at 8/16/2018 10:18 AM CDT -----  Contact: Sussy GOMEZ [phone]  Caller is requesting to schedule their annual screening mammogram appointment. Order is not listed in Epic.  Please enter order and contact patient to schedule.    Where would they like the mammogram performed?:  [facility]   Additional information:

## 2018-08-23 ENCOUNTER — TELEPHONE (OUTPATIENT)
Dept: INFECTIOUS DISEASES | Facility: HOSPITAL | Age: 83
End: 2018-08-23

## 2018-08-23 ENCOUNTER — LAB VISIT (OUTPATIENT)
Dept: LAB | Facility: HOSPITAL | Age: 83
End: 2018-08-23
Attending: INTERNAL MEDICINE
Payer: MEDICARE

## 2018-08-23 ENCOUNTER — OFFICE VISIT (OUTPATIENT)
Dept: ENDOCRINOLOGY | Facility: CLINIC | Age: 83
End: 2018-08-23
Payer: MEDICARE

## 2018-08-23 VITALS
BODY MASS INDEX: 23.28 KG/M2 | HEIGHT: 65 IN | SYSTOLIC BLOOD PRESSURE: 130 MMHG | HEART RATE: 64 BPM | RESPIRATION RATE: 16 BRPM | DIASTOLIC BLOOD PRESSURE: 80 MMHG | WEIGHT: 139.75 LBS

## 2018-08-23 DIAGNOSIS — E11.29 TYPE 2 DIABETES MELLITUS WITH MICROALBUMINURIA, WITHOUT LONG-TERM CURRENT USE OF INSULIN: ICD-10-CM

## 2018-08-23 DIAGNOSIS — R80.9 TYPE 2 DIABETES MELLITUS WITH MICROALBUMINURIA, WITHOUT LONG-TERM CURRENT USE OF INSULIN: ICD-10-CM

## 2018-08-23 DIAGNOSIS — I10 HYPERTENSION, UNSPECIFIED TYPE: ICD-10-CM

## 2018-08-23 DIAGNOSIS — M81.0 SENILE OSTEOPOROSIS: Primary | ICD-10-CM

## 2018-08-23 DIAGNOSIS — M81.0 SENILE OSTEOPOROSIS: ICD-10-CM

## 2018-08-23 LAB
25(OH)D3+25(OH)D2 SERPL-MCNC: 65 NG/ML
ALBUMIN SERPL BCP-MCNC: 3.5 G/DL
ANION GAP SERPL CALC-SCNC: 8 MMOL/L
BUN SERPL-MCNC: 15 MG/DL
CALCIUM SERPL-MCNC: 10.3 MG/DL
CHLORIDE SERPL-SCNC: 106 MMOL/L
CO2 SERPL-SCNC: 29 MMOL/L
CREAT SERPL-MCNC: 0.9 MG/DL
EST. GFR  (AFRICAN AMERICAN): >60 ML/MIN/1.73 M^2
EST. GFR  (NON AFRICAN AMERICAN): >60 ML/MIN/1.73 M^2
ESTIMATED AVG GLUCOSE: 128 MG/DL
GLUCOSE SERPL-MCNC: 134 MG/DL
HBA1C MFR BLD HPLC: 6.1 %
PHOSPHATE SERPL-MCNC: 3.6 MG/DL
POTASSIUM SERPL-SCNC: 3.8 MMOL/L
PTH-INTACT SERPL-MCNC: 71 PG/ML
SODIUM SERPL-SCNC: 143 MMOL/L

## 2018-08-23 PROCEDURE — 80069 RENAL FUNCTION PANEL: CPT

## 2018-08-23 PROCEDURE — 83036 HEMOGLOBIN GLYCOSYLATED A1C: CPT

## 2018-08-23 PROCEDURE — 99999 PR PBB SHADOW E&M-EST. PATIENT-LVL III: CPT | Mod: PBBFAC,,, | Performed by: INTERNAL MEDICINE

## 2018-08-23 PROCEDURE — 36415 COLL VENOUS BLD VENIPUNCTURE: CPT

## 2018-08-23 PROCEDURE — 82306 VITAMIN D 25 HYDROXY: CPT

## 2018-08-23 PROCEDURE — 99213 OFFICE O/P EST LOW 20 MIN: CPT | Mod: PBBFAC | Performed by: INTERNAL MEDICINE

## 2018-08-23 PROCEDURE — 99214 OFFICE O/P EST MOD 30 MIN: CPT | Mod: S$PBB,,, | Performed by: INTERNAL MEDICINE

## 2018-08-23 PROCEDURE — 83970 ASSAY OF PARATHORMONE: CPT

## 2018-08-23 RX ORDER — RISEDRONATE SODIUM 35 MG/1
35 TABLET, FILM COATED ORAL
Qty: 12 TABLET | Refills: 3 | Status: SHIPPED | OUTPATIENT
Start: 2018-08-23 | End: 2019-08-01 | Stop reason: SDUPTHER

## 2018-08-23 NOTE — PROGRESS NOTES
Subjective:      Patient ID: Sussy Costa is a 82 y.o. female.    Chief Complaint:  Osteoporosis      History of Present Illness  Ms. Subramanianlpresents for follow up of osteoporosis. Last visit 1/2018.    She was given first dose of Prolia in 2/2018 and developed joint pains and significant lower ext edema so it was decided that she would not receive another dose of Prolia.      First diagnosed with osteoporosis in 2007     Current osteoporosis regimen:  Prolia 60 mg x 1 (1st dose 2/2018)  Vitamin D 1000 units daily     Osteoporosis medication history:  Actonel  Fosamax     Risk factors for osteoporosis  -Personal history of fracture: had a fracture of left 5th finger, no hx of fragility frax  -Family history of hip fracture or osteoporosis: mother had hip frax at age 92  -Premature/Early menopause: menopause at age 56, on HRT for a few months in the past  -Chronic steroid therapy: intermittently gets steroid taper for asthma  -History of rheumatoid arthritis: none  -Smoking History: never smoked  -Current EtOH use: social EtOH  -Fall Risk: good balance, has tripped and fallen twice in past 10-15 years     Risk for Secondary Osteoporosis:  -TSH: recent TSH WNL  -Calcium: calcium level was high so calcium supplementation stopped. Had elevated PTH but this was shortly after Prolia injection  -Vitamin D: WNL  -Anemia and renal insufficiency: some anemia, no CKD  -Signs/symptoms of malabsorption: none  -PPI use: none  -Diabetes: Last A1c at goal   -Chronic liver disease: none  -Hx gastric bypass surgery: none     Height loss:  1/2 inch height loss     Exercise:  No formal exercise     Pending dental work:  None     Last Bone Density Scan dated 11/13/2017:    BONE MINERAL DENSITY RESULTS:  Lumbar Spine: Lumbar bone mineral density L1-L4 is 0.870g/cm2, which is a t-score of -1.6. The z-score is 1.1.    Total Hip: The total hip bone mineral density is 0.629g/cm2.  The t-score is -2.6, and the z-score is -0.5.  Femoral  neck BMD is 0.554g/cm2 and the t-score is -2.7.    COMPARISONS:  Date Location BMD T-score  05/28/15 L-spine 0.875 -1.6  Total Hip 0.707 -1.9   Impression        Osteoporosis of the femoral neck and total hip. There is a significant decline in BMD at the total hip (-11%) when compared to previous study.   No change at the lumbar spine.       Diabetes controlled on metformin and glimepiride. UTD with eye exam and urine micro       Review of Systems   Constitutional: Negative for chills and fever.   Gastrointestinal: Negative for nausea.       Objective:   Physical Exam   Nursing note and vitals reviewed.      Lab Review:   Results for NINFA ORTEGA (MRN [id number]) as of 8/23/2018 09:27   Ref. Range 5/28/2018 09:46   Sodium Latest Ref Range: 136 - 145 mmol/L 142   Potassium Latest Ref Range: 3.5 - 5.1 mmol/L 3.8   Chloride Latest Ref Range: 95 - 110 mmol/L 107   CO2 Latest Ref Range: 23 - 29 mmol/L 27   Anion Gap Latest Ref Range: 8 - 16 mmol/L 8   BUN, Bld Latest Ref Range: 8 - 23 mg/dL 14   Creatinine Latest Ref Range: 0.5 - 1.4 mg/dL 0.9   eGFR if non African American Latest Ref Range: >60 mL/min/1.73 m^2 >60.0   eGFR if African American Latest Ref Range: >60 mL/min/1.73 m^2 >60.0   Glucose Latest Ref Range: 70 - 110 mg/dL 168 (H)   Calcium Latest Ref Range: 8.7 - 10.5 mg/dL 9.9   Phosphorus Latest Ref Range: 2.7 - 4.5 mg/dL 2.7   Albumin Latest Ref Range: 3.5 - 5.2 g/dL 3.4 (L)   Vit D, 25-Hydroxy Latest Ref Range: 30 - 96 ng/mL 56   PTH Latest Ref Range: 9.0 - 77.0 pg/mL 108.0 (H)       Assessment:     1. Senile osteoporosis    2. Type 2 diabetes mellitus with microalbuminuria, without long-term current use of insulin    3. Hypertension, unspecified type      Plan:     --Patient with postmenopausal osteoporosis  --Risk factors include: age, post menopausal status, and fam hx of hip fracture  --Will d/c Prolia since patient had significant lower ext swelling and joint pains  --Will start Actonel 35 mg  weekly  --Will repeat bone density in 11/2019 at [facility]  --To continue vit d replacement and hold calcium supplementation  --Will repeat PTH and calcium now, PTH may have been elevated from Prolia injection  --DMII well controlled on current regimen, repeat A1c now  --Bp stable on current regimen    RTC after bone density in 11/209     Joshua Martines M.D. Staff Endocrinology

## 2018-08-27 NOTE — PROGRESS NOTES
Subjective:      Patient ID: Sussy Costa is a 82 y.o. female.    Chief Complaint: Diabetes Mellitus (Dr EZ fierro 6/6/18); Diabetic Foot Exam; and Routine Foot Care    Sussy is a 82 y.o. female who presents to the clinic for evaluation and treatment of high risk feet. Sussy has a past medical history of Asthma, Cyst of pancreas, Diabetes mellitus type II, Eczema of hand, Glaucoma, Hyperlipidemia, Hypertension, Lichen planus, Osteoporosis, and Pulmonary nodules. The patient's chief complaint is long, thick toenails. This patient has documented high risk feet requiring routine maintenance secondary to diabetes mellitis and those secondary complications of diabetes, as mentioned..    PCP: EZ Fierro MD    Date Last Seen by PCP: Dr EZ fierro 6/6/18    Current shoe gear:  Affected Foot: Tennis shoes     Unaffected Foot: Tennis shoes    Hemoglobin A1C   Date Value Ref Range Status   08/23/2018 6.1 (H) 4.0 - 5.6 % Final     Comment:     ADA Screening Guidelines:  5.7-6.4%  Consistent with prediabetes  >or=6.5%  Consistent with diabetes  High levels of fetal hemoglobin interfere with the HbA1C  assay. Heterozygous hemoglobin variants (HbS, HgC, etc)do  not significantly interfere with this assay.   However, presence of multiple variants may affect accuracy.     12/27/2017 7.0 (H) 4.0 - 5.6 % Final     Comment:     According to ADA guidelines, hemoglobin A1c <7.0% represents  optimal control in non-pregnant diabetic patients. Different  metrics may apply to specific patient populations.   Standards of Medical Care in Diabetes-2016.  For the purpose of screening for the presence of diabetes:  <5.7%     Consistent with the absence of diabetes  5.7-6.4%  Consistent with increasing risk for diabetes   (prediabetes)  >or=6.5%  Consistent with diabetes  Currently, no consensus exists for use of hemoglobin A1c  for diagnosis of diabetes for children.  This Hemoglobin A1c assay has significant interference  with fetal   hemoglobin   (HbF). The results are invalid for patients with abnormal amounts of   HbF,   including those with known Hereditary Persistence   of Fetal Hemoglobin. Heterozygous hemoglobin variants (HbAS, HbAC,   HbAD, HbAE, HbA2) do not significantly interfere with this assay;   however, presence of multiple variants in a sample may impact the %   interference.     11/02/2017 6.8 (H) 4.0 - 5.6 % Final     Comment:     According to ADA guidelines, hemoglobin A1c <7.0% represents  optimal control in non-pregnant diabetic patients. Different  metrics may apply to specific patient populations.   Standards of Medical Care in Diabetes-2016.  For the purpose of screening for the presence of diabetes:  <5.7%     Consistent with the absence of diabetes  5.7-6.4%  Consistent with increasing risk for diabetes   (prediabetes)  >or=6.5%  Consistent with diabetes  Currently, no consensus exists for use of hemoglobin A1c  for diagnosis of diabetes for children.  This Hemoglobin A1c assay has significant interference with fetal   hemoglobin   (HbF). The results are invalid for patients with abnormal amounts of   HbF,   including those with known Hereditary Persistence   of Fetal Hemoglobin. Heterozygous hemoglobin variants (HbAS, HbAC,   HbAD, HbAE, HbA2) do not significantly interfere with this assay;   however, presence of multiple variants in a sample may impact the %   interference.         Review of Systems   Constitution: Negative for chills, fever and malaise/fatigue.   HENT: Negative for hearing loss.    Cardiovascular: Negative for claudication.   Respiratory: Negative for shortness of breath.    Skin: Positive for color change, dry skin, nail changes and unusual hair distribution. Negative for flushing and rash.   Musculoskeletal: Negative for joint pain and myalgias.   Neurological: Negative for loss of balance, numbness, paresthesias and sensory change.   Psychiatric/Behavioral: Negative for altered mental  status.           Objective:      Physical Exam   Constitutional: She appears well-developed and well-nourished. She is cooperative.   Cardiovascular:   Pulses:       Dorsalis pedis pulses are 0 on the right side, and 0 on the left side.        Posterior tibial pulses are 1+ on the right side, and 1+ on the left side.   no LE edema noted b/L   Musculoskeletal:        Right ankle: She exhibits decreased range of motion and abnormal pulse. Achilles tendon exhibits normal Steele's test results.        Left ankle: She exhibits decreased range of motion and abnormal pulse. Achilles tendon exhibits normal Steele's test results.        Right foot: There is decreased range of motion.        Left foot: There is decreased range of motion.   Adequate joint ROM noted to all lower extremity muscle groups with no pain or crepitation noted. Muscle strength is 5/5 in all groups bilaterally.     Feet:   Right Foot:   Protective Sensation: 5 sites tested. 5 sites sensed.   Left Foot:   Protective Sensation: 5 sites tested. 5 sites sensed.   Neurological: She is alert.   intact sensation noted to b/L lower extremities   Skin: Skin is dry. Capillary refill takes more than 3 seconds.   Nails x10 are elongated by  3-4mm's, thickened by 1-3 mm's, dystrophic, and are yellowish in  coloration . Xerosis Bilaterally. No open lesions noted.   Hyperpigmentation noted to b/L lower extremities.      Psychiatric: Her behavior is normal.   Vitals reviewed.            Assessment:       Encounter Diagnoses   Name Primary?    Type 2 diabetes mellitus with complication, unspecified whether long term insulin use Yes    Onychomycosis due to dermatophyte          Plan:       Sussy was seen today for diabetes mellitus, diabetic foot exam and routine foot care.    Diagnoses and all orders for this visit:    Type 2 diabetes mellitus with complication, unspecified whether long term insulin use    Onychomycosis due to dermatophyte      I counseled the  patient on her conditions, their implications and medical management.        - Shoe inspection. Diabetic Foot Education. Patient reminded of the importance of good nutrition and blood sugar control to help prevent podiatric complications of diabetes. Patient instructed on proper foot hygeine. We discussed wearing proper shoe gear, daily foot inspections, never walking without protective shoe gear, never putting sharp instruments to feet, routine podiatric nail visits every 2-3 months.      - With patient's permission, nails were aggressively reduced and debrided x 10 to their soft tissue attachment mechanically and with electric , removing all offending nail and debris. Patient relates relief following the procedure. She will continue to monitor the areas daily, inspect her feet, wear protective shoe gear when ambulatory, moisturizer to maintain skin integrity and follow in this office in approximately 2-3 months, sooner p.r.n.

## 2018-08-28 ENCOUNTER — HOSPITAL ENCOUNTER (OUTPATIENT)
Dept: RADIOLOGY | Facility: HOSPITAL | Age: 83
Discharge: HOME | End: 2018-08-28
Attending: INTERNAL MEDICINE
Payer: MEDICARE

## 2018-08-28 ENCOUNTER — OFFICE VISIT (OUTPATIENT)
Dept: INTERNAL MEDICINE | Facility: CLINIC | Age: 83
End: 2018-08-28
Payer: MEDICARE

## 2018-08-28 VITALS
OXYGEN SATURATION: 98 % | TEMPERATURE: 98 F | DIASTOLIC BLOOD PRESSURE: 82 MMHG | BODY MASS INDEX: 22.81 KG/M2 | HEIGHT: 65 IN | HEART RATE: 85 BPM | RESPIRATION RATE: 16 BRPM | SYSTOLIC BLOOD PRESSURE: 148 MMHG | WEIGHT: 136.88 LBS

## 2018-08-28 DIAGNOSIS — M54.50 LOW BACK PAIN, NON-SPECIFIC: ICD-10-CM

## 2018-08-28 DIAGNOSIS — Z12.31 VISIT FOR SCREENING MAMMOGRAM: ICD-10-CM

## 2018-08-28 DIAGNOSIS — M48.061 SPINAL STENOSIS OF LUMBAR REGION, UNSPECIFIED WHETHER NEUROGENIC CLAUDICATION PRESENT: ICD-10-CM

## 2018-08-28 DIAGNOSIS — E11.59 HYPERTENSION ASSOCIATED WITH DIABETES: Primary | ICD-10-CM

## 2018-08-28 DIAGNOSIS — M54.16 LUMBAR RADICULOPATHY: ICD-10-CM

## 2018-08-28 DIAGNOSIS — I15.2 HYPERTENSION ASSOCIATED WITH DIABETES: Primary | ICD-10-CM

## 2018-08-28 PROCEDURE — 99213 OFFICE O/P EST LOW 20 MIN: CPT | Mod: PBBFAC,PO | Performed by: INTERNAL MEDICINE

## 2018-08-28 PROCEDURE — 99999 PR PBB SHADOW E&M-EST. PATIENT-LVL III: CPT | Mod: PBBFAC,,, | Performed by: INTERNAL MEDICINE

## 2018-08-28 PROCEDURE — 99214 OFFICE O/P EST MOD 30 MIN: CPT | Mod: S$PBB,,, | Performed by: INTERNAL MEDICINE

## 2018-08-28 PROCEDURE — 77063 BREAST TOMOSYNTHESIS BI: CPT | Mod: 26,,, | Performed by: RADIOLOGY

## 2018-08-28 PROCEDURE — 77067 SCR MAMMO BI INCL CAD: CPT | Mod: 26,,, | Performed by: RADIOLOGY

## 2018-08-28 PROCEDURE — 77063 BREAST TOMOSYNTHESIS BI: CPT | Mod: TC,PO

## 2018-08-30 ENCOUNTER — TELEPHONE (OUTPATIENT)
Dept: ENDOCRINOLOGY | Facility: CLINIC | Age: 83
End: 2018-08-30

## 2018-08-30 ENCOUNTER — OFFICE VISIT (OUTPATIENT)
Dept: OTOLARYNGOLOGY | Facility: CLINIC | Age: 83
End: 2018-08-30
Payer: MEDICARE

## 2018-08-30 VITALS
SYSTOLIC BLOOD PRESSURE: 152 MMHG | BODY MASS INDEX: 23.52 KG/M2 | WEIGHT: 141.13 LBS | HEART RATE: 70 BPM | HEIGHT: 65 IN | DIASTOLIC BLOOD PRESSURE: 74 MMHG

## 2018-08-30 DIAGNOSIS — J32.8 OTHER CHRONIC SINUSITIS: Primary | ICD-10-CM

## 2018-08-30 DIAGNOSIS — J33.8 POLYP OF PARANASAL SINUS: ICD-10-CM

## 2018-08-30 DIAGNOSIS — H65.91 RIGHT OTITIS MEDIA WITH EFFUSION: ICD-10-CM

## 2018-08-30 DIAGNOSIS — H69.93 ETD (EUSTACHIAN TUBE DYSFUNCTION), BILATERAL: ICD-10-CM

## 2018-08-30 PROBLEM — M48.061 SPINAL STENOSIS OF LUMBAR REGION: Status: ACTIVE | Noted: 2018-08-30

## 2018-08-30 PROBLEM — M54.16 LUMBAR RADICULOPATHY: Status: ACTIVE | Noted: 2018-08-30

## 2018-08-30 PROCEDURE — 99214 OFFICE O/P EST MOD 30 MIN: CPT | Mod: PBBFAC,PO,25 | Performed by: OTOLARYNGOLOGY

## 2018-08-30 PROCEDURE — 31231 NASAL ENDOSCOPY DX: CPT | Mod: PBBFAC,PO | Performed by: OTOLARYNGOLOGY

## 2018-08-30 PROCEDURE — 99214 OFFICE O/P EST MOD 30 MIN: CPT | Mod: 25,S$PBB,, | Performed by: OTOLARYNGOLOGY

## 2018-08-30 PROCEDURE — 99999 PR PBB SHADOW E&M-EST. PATIENT-LVL IV: CPT | Mod: PBBFAC,,, | Performed by: OTOLARYNGOLOGY

## 2018-08-30 PROCEDURE — 31231 NASAL ENDOSCOPY DX: CPT | Mod: S$PBB,,, | Performed by: OTOLARYNGOLOGY

## 2018-08-30 RX ORDER — PREDNISONE 20 MG/1
TABLET ORAL
Qty: 21 TABLET | Refills: 0 | Status: SHIPPED | OUTPATIENT
Start: 2018-08-30 | End: 2018-10-12 | Stop reason: ALTCHOICE

## 2018-08-30 RX ORDER — CICLESONIDE 50 UG/1
2 SPRAY NASAL DAILY
Qty: 12.5 G | Refills: 6 | Status: SHIPPED | OUTPATIENT
Start: 2018-08-30 | End: 2019-03-13

## 2018-08-30 RX ORDER — MOXIFLOXACIN HYDROCHLORIDE 400 MG/1
400 TABLET ORAL DAILY
Qty: 10 TABLET | Refills: 0 | Status: SHIPPED | OUTPATIENT
Start: 2018-08-30 | End: 2018-09-09

## 2018-08-30 NOTE — TELEPHONE ENCOUNTER
----- Message from Desi Chacon sent at 8/30/2018 12:31 PM CDT -----  Contact: Self  .Needs Advice    Reason for call:    Pt is asking if Dr Martines is sending her a request to be seen on 9/27 @ [facility] through the portal. If so she will be able to .  Communication Preference: [phone]  Additional Information: she says message was send to [facility]

## 2018-08-30 NOTE — PROGRESS NOTES
Chief Complaint   Patient presents with    Follow-up    Ear Fullness     right ear   .    HPI:     Sussy Costa is a 82 y.o. female who is known to me from my previous practice with chronic sinusitis who presents today with right otalgia for 2 weeks. She reports that the pain in mild in nature and describes it as a sharp intermittent pain.  She states it is gradually worsening.  She notes right aural fullness with muffled hearing in the right ear only.  She reports that she is not having any nasal congestion, facial pain or pressure, rhinorrhea, or post-nasal drip at present.  She using nasal rinses.  She continues on Singulair, Omnaris.     Interval HPI 8/30/2018:    F/u CRS with history of nasal polyposis.  She reports that she has had a 2 week history of nasal congestion, facial pain and pressure, and thickened rhinorrhea/post-nasal drip.  She has also noted right aural fullness and muffled hearing on the right. She denies ear pain.  She states that she has been on Singulair and Omnaris. She feels this helps somewhat.       Past Medical History:   Diagnosis Date    Asthma     Cyst of pancreas     liver    Diabetes mellitus type II     Eczema of hand     Glaucoma     Hyperlipidemia     Hypertension     Lichen planus     gums    Osteoporosis     Pulmonary nodules      Social History     Socioeconomic History    Marital status:      Spouse name: sania    Number of children: 1    Years of education: Not on file    Highest education level: Not on file   Social Needs    Financial resource strain: Not on file    Food insecurity - worry: Not on file    Food insecurity - inability: Not on file    Transportation needs - medical: Not on file    Transportation needs - non-medical: Not on file   Occupational History    Occupation:    Tobacco Use    Smoking status: Never Smoker    Smokeless tobacco: Never Used   Substance and Sexual Activity    Alcohol use: Yes     Comment:  socially    Drug use: No    Sexual activity: Yes     Partners: Male     Birth control/protection: Post-menopausal   Other Topics Concern    Are you pregnant or think you may be? Not Asked    Breast-feeding Not Asked   Social History Narrative    She does not exercise regularly.     Past Surgical History:   Procedure Laterality Date    CATARACT EXTRACTION, BILATERAL      CHOLECYSTECTOMY      HYSTERECTOMY      nasal polyps       Family History   Problem Relation Age of Onset    Heart disease Father     Heart disease Brother     Hypertension Brother            Review of Systems  General: negative for chills, fever or weight loss  Psychological: negative for mood changes or depression  Ophthalmic: negative for blurry vision, photophobia or eye pain  ENT: see HPI  Respiratory: no cough, shortness of breath, or wheezing  Cardiovascular: no chest pain or dyspnea on exertion  Gastrointestinal: no abdominal pain, change in bowel habits, or black/ bloody stools  Musculoskeletal: negative for gait disturbance or muscular weakness  Neurological: no syncope or seizures; no ataxia  Dermatological: negative for puritis,  rash and jaundice  Hematologic/lymphatic: no easy bruising, no new lumps or bumps      Physical Exam:    Vitals:    08/30/18 0945   BP: (!) 152/74   Pulse: 70       Constitutional: Well appearing / communicating without difficutly.  NAD.  Eyes: EOM I Bilaterally  Head/Face: Normocephalic.  Negative paranasal sinus pressure/tenderness.  Salivary glands WNL.  House Brackmann I Bilaterally.    Right Ear: Auricle normal appearance. External Auditory Canal within normal limits,TM:  effusion resolved. TM mobility limited.    Left Ear: Auricle normal appearance. External Auditory Canal WNL,TM retracted. TM mobility limited.  Nose: No gross nasal septal deviation. Inferior Turbinates edematous 3+ bilaterally. No septal perforation. No masses/lesions. External nasal skin appears normal without masses/lesions.  Thick purulent crusts present.   Oral Cavity: Gingiva/lips within normal limits.  Dentition/gingiva healthy appearing. Mucus membranes moist. Floor of mouth soft, no masses palpated. Oral Tongue mobile. Hard Palate appears normal.    Oropharynx: Base of tongue appears normal. No masses/lesions noted. Tonsillar fossa/pharyngeal wall without lesions. Posterior oropharynx WNL.  Soft palate without masses. Midline uvula.   Neck/Lymphatic: No LAD I-VI bilaterally.  No thyromegaly.  No masses noted on exam.    Mirror laryngoscopy/nasopharyngoscopy: Active gag reflex.  Unable to perform.    Neuro/Psychiatric: AOx3.  Normal mood and affect.   Cardiovascular: Normal carotid pulses bilaterally, no increasing jugular venous distention noted at cervical region bilaterally.    Respiratory: Normal respiratory effort, no stridor, no retractions noted.    See separate procedure note for FFL.      Assessment:    ICD-10-CM ICD-9-CM    1. Other chronic sinusitis J32.8 473.8    2. Polyp of paranasal sinus J33.8 471.8    3. Right otitis media with effusion H65.91 381.4    4. ETD (Eustachian tube dysfunction), bilateral H69.83 381.81      The primary encounter diagnosis was Other chronic sinusitis. Diagnoses of Polyp of paranasal sinus, Right otitis media with effusion, and ETD (Eustachian tube dysfunction), bilateral were also pertinent to this visit.      Plan:    CRS with polyposis: Avelox 400mg PO daily for 10 days. Prednisone taper. Continue saline rinses BID to TID. Continue Omnaris and Singulair.     ETD/OME secondary to above. Will treat as above.     Follow up in 2-3 weeks.     .Rosangela Isabel MD

## 2018-08-30 NOTE — PROCEDURES
Procedures     PROCEDURE NOTE:  Nasal endoscopy   Preprocedure diagnosis:  Chronic sinusitis  Postprocedure diangosis:  Same  Complications:  None  Blood Loss:  None    Procedure in detail:  After verbal consent was obtained, the patient's nasal cavity was anesthesized using topical 1%lidocaine and Neosynepherine.  A rigid 0 degree endoscope was placed in first the right, then the left nasal cavity.  The inferior and middle turbinates were examined, and found to be edematous bilaterally.  The middle meatus and maxillary antrum was also examined, and found to have thick crusting and polypoid mucosa present bilaterally.  No masses seen.  The patient tolerated the procedure well and there were no complications.

## 2018-08-30 NOTE — PROGRESS NOTES
This office note has been dictated.  HISTORY:  This is an 82-year-old lady with hypertension, diabetes mellitus and   other issues in today following up on a recent pharmacologic therapy change on   hypertension.  Also, she is having concerns about increasing lower back pain.    We had discontinued amlodipine recently due to lower extremity swelling and she   returns for blood pressure recheck.  She does report essential resolution of her   lower extremity edema.  She reports no chest pain, palpitations or syncope.    The patient has known lumbar spinal stenosis and lumbar radiculopathy.  She has   seen our Pain Management Department back in 2009.  Recently having some   increasing symptoms for several months with some right-sided thigh radiation.    No gait disturbance.  No bowel incontinence.  No urinary incontinence.    CURRENT MEDICATIONS:  All medications noted and reviewed in the electronic   medical record medication list.    REVIEW OF SYSTEMS:  CONSTITUTIONAL:  No fever, chills or generalized body aches.  CARDIOVASCULAR:  Denies chest pain, palpitations, syncope, presyncope or   claudication.  GASTROINTESTINAL:  No nausea, no vomiting, no abdominal pain, no bowel   incontinence.  GENITOURINARY:  No change in color or character of her urine.    PAST MEDICAL HISTORY, PAST SURGICAL HISTORY, FAMILY MEDICAL HISTORY, AND SOCIAL   HISTORY:  All noted and reviewed in our electronic medical record history   sections.    PHYSICAL EXAMINATION:  GENERAL:  Pleasant, alert, appropriately groomed lady in no acute distress.  VITAL SIGNS:  Blood pressure taken manually is 148/82.  HEENT:  Normocephalic.  NECK:  Supple, no masses, no thyromegaly.  LUNGS:  Clear to auscultation.  CARDIOVASCULAR:  Regular rate and rhythm, no significant murmur.  Carotids are   full bilaterally without bruits.  No JVD.  Trace distal lower extremity edema.  BACK:  No gross deformity.  No tenderness to palpation.  NEUROLOGIC:  Negative straight  leg raising.  No gross motor deficits.  MENTAL STATUS:  Alert, oriented.  Affect and mood all appropriate.    IMPRESSION:  1.  Hypertension, systolic borderline.  2.  Resolution of lower extremity edema with the cessation of amlodipine.  3.  Diabetes mellitus.  4.  Chronic worsening lower back pain with a history of lumbar spinal stenosis.    PLAN:  1.  She is advised to monitor blood pressure readings regularly at home over the   next six weeks and to return to clinic in six weeks with those readings for our   review and home blood pressure monitor for validation.  2.  Update an MRI of the lumbosacral spine with the anticipation of a probable   referral to our Pain Management Department.      PB/HN  dd: 08/30/2018 07:58:45 (CDT)  td: 08/30/2018 19:03:21 (CDT)  Doc ID   #[id number]  Job ID #[id number]    CC:

## 2018-08-30 NOTE — TELEPHONE ENCOUNTER
We did not send her a request for a follow up visit. I did not need to see her back until one year. Please let patient know that I reviewed her labs. A1c for diabetes improved and at goal. Her Vitamin D is normal on the current supplementation dose. Her parathyroid hormone is normal after stopping the Prolia. Will plan to see her back in clinic in one year.

## 2018-08-31 NOTE — TELEPHONE ENCOUNTER
Left voicemail message with all results and stating she doesn't need a visit until a year from now that Dr Martines was not trying to contact her.

## 2018-09-04 ENCOUNTER — HOSPITAL ENCOUNTER (OUTPATIENT)
Dept: RADIOLOGY | Facility: HOSPITAL | Age: 83
Discharge: HOME | End: 2018-09-04
Attending: INTERNAL MEDICINE
Payer: MEDICARE

## 2018-09-04 DIAGNOSIS — M54.16 LUMBAR RADICULOPATHY: ICD-10-CM

## 2018-09-04 DIAGNOSIS — M48.061 SPINAL STENOSIS OF LUMBAR REGION, UNSPECIFIED WHETHER NEUROGENIC CLAUDICATION PRESENT: ICD-10-CM

## 2018-09-04 DIAGNOSIS — M54.50 LOW BACK PAIN, NON-SPECIFIC: ICD-10-CM

## 2018-09-04 PROCEDURE — 72148 MRI LUMBAR SPINE W/O DYE: CPT | Mod: 26,,, | Performed by: RADIOLOGY

## 2018-09-04 PROCEDURE — 72148 MRI LUMBAR SPINE W/O DYE: CPT | Mod: TC

## 2018-09-10 ENCOUNTER — TELEPHONE (OUTPATIENT)
Dept: INTERNAL MEDICINE | Facility: CLINIC | Age: 83
End: 2018-09-10

## 2018-09-10 DIAGNOSIS — M48.061 SPINAL STENOSIS OF LUMBAR REGION, UNSPECIFIED WHETHER NEUROGENIC CLAUDICATION PRESENT: Primary | ICD-10-CM

## 2018-09-11 NOTE — TELEPHONE ENCOUNTER
Advise mri did show spinal canal narrowing --as we discussed I advise referral to Pain Management for other possible treatment options.  Referral entered

## 2018-09-11 NOTE — TELEPHONE ENCOUNTER
Referral message sent to [facility] for pain mgmnt.    Spoke with pt to advise of results and MD recommendations.    Verbalized understanding.

## 2018-09-21 ENCOUNTER — CLINICAL SUPPORT (OUTPATIENT)
Dept: OTOLARYNGOLOGY | Facility: CLINIC | Age: 83
End: 2018-09-21
Payer: MEDICARE

## 2018-09-21 ENCOUNTER — OFFICE VISIT (OUTPATIENT)
Dept: OTOLARYNGOLOGY | Facility: CLINIC | Age: 83
End: 2018-09-21
Payer: MEDICARE

## 2018-09-21 VITALS
DIASTOLIC BLOOD PRESSURE: 73 MMHG | BODY MASS INDEX: 23.5 KG/M2 | HEART RATE: 73 BPM | SYSTOLIC BLOOD PRESSURE: 148 MMHG | WEIGHT: 141.19 LBS

## 2018-09-21 DIAGNOSIS — H69.93 ETD (EUSTACHIAN TUBE DYSFUNCTION), BILATERAL: ICD-10-CM

## 2018-09-21 DIAGNOSIS — H65.91 RIGHT OTITIS MEDIA WITH EFFUSION: ICD-10-CM

## 2018-09-21 DIAGNOSIS — J32.8 OTHER CHRONIC SINUSITIS: Primary | ICD-10-CM

## 2018-09-21 DIAGNOSIS — J30.89 NON-SEASONAL ALLERGIC RHINITIS, UNSPECIFIED TRIGGER: ICD-10-CM

## 2018-09-21 DIAGNOSIS — H90.3 SENSORINEURAL HEARING LOSS, BILATERAL: ICD-10-CM

## 2018-09-21 DIAGNOSIS — J33.8 POLYP OF PARANASAL SINUS: ICD-10-CM

## 2018-09-21 DIAGNOSIS — H69.93 ETD (EUSTACHIAN TUBE DYSFUNCTION), BILATERAL: Primary | ICD-10-CM

## 2018-09-21 PROCEDURE — 99211 OFF/OP EST MAY X REQ PHY/QHP: CPT | Mod: PBBFAC,PO,25 | Performed by: AUDIOLOGIST-HEARING AID FITTER

## 2018-09-21 PROCEDURE — 99213 OFFICE O/P EST LOW 20 MIN: CPT | Mod: S$PBB,,, | Performed by: OTOLARYNGOLOGY

## 2018-09-21 PROCEDURE — 99214 OFFICE O/P EST MOD 30 MIN: CPT | Mod: PBBFAC,25,27,PO | Performed by: OTOLARYNGOLOGY

## 2018-09-21 PROCEDURE — 99999 PR PBB SHADOW E&M-EST. PATIENT-LVL IV: CPT | Mod: PBBFAC,,, | Performed by: OTOLARYNGOLOGY

## 2018-09-21 PROCEDURE — 99999 PR PBB SHADOW E&M-EST. PATIENT-LVL I: CPT | Mod: PBBFAC,,, | Performed by: AUDIOLOGIST-HEARING AID FITTER

## 2018-09-21 PROCEDURE — 92567 TYMPANOMETRY: CPT | Mod: PBBFAC,PO | Performed by: AUDIOLOGIST-HEARING AID FITTER

## 2018-09-21 NOTE — PROGRESS NOTES
Chief Complaint   Patient presents with    Other     allergy follow up feeling better    Sinusitis   .    HPI:     Sussy Costa is a 82 y.o. female who is known to me from my previous practice with chronic sinusitis who presents today with right otalgia for 2 weeks. She reports that the pain in mild in nature and describes it as a sharp intermittent pain.  She states it is gradually worsening.  She notes right aural fullness with muffled hearing in the right ear only.  She reports that she is not having any nasal congestion, facial pain or pressure, rhinorrhea, or post-nasal drip at present.  She using nasal rinses.  She continues on Singulair, Omnaris.     Interval HPI 9/21/2018:    F/u CRS with history of nasal polyposis.  She reports that nasal congestion, facial pain and pressure, and thickened rhinorrhea/post-nasal drip is improved after completing Lovenox. She states that she has been on Singulair and Omnaris. She feels this helps somewhat as well.       Past Medical History:   Diagnosis Date    Asthma     Cyst of pancreas     liver    Diabetes mellitus type II     Eczema of hand     Glaucoma     Hyperlipidemia     Hypertension     Lichen planus     gums    Osteoporosis     Pulmonary nodules      Social History     Socioeconomic History    Marital status:      Spouse name: sania    Number of children: 1    Years of education: Not on file    Highest education level: Not on file   Social Needs    Financial resource strain: Not on file    Food insecurity - worry: Not on file    Food insecurity - inability: Not on file    Transportation needs - medical: Not on file    Transportation needs - non-medical: Not on file   Occupational History    Occupation:    Tobacco Use    Smoking status: Never Smoker    Smokeless tobacco: Never Used   Substance and Sexual Activity    Alcohol use: Yes     Comment: socially    Drug use: No    Sexual activity: Yes     Partners: Male      Birth control/protection: Post-menopausal   Other Topics Concern    Are you pregnant or think you may be? Not Asked    Breast-feeding Not Asked   Social History Narrative    She does not exercise regularly.     Past Surgical History:   Procedure Laterality Date    CATARACT EXTRACTION, BILATERAL      CHOLECYSTECTOMY      HYSTERECTOMY      INJECTION-JOINT Right 7/10/2014    Performed [facility]on [facility] at [facility]    nasal polyps      ULTRASOUND-ENDOSCOPIC-UPPER N/A 3/18/2016    Performed by Max Rosado MD at [facility] ENDO (2ND FLR)     Family History   Problem Relation Age of Onset    Heart disease Father     Heart disease Brother     Hypertension Brother            Review of Systems  General: negative for chills, fever or weight loss  Psychological: negative for mood changes or depression  Ophthalmic: negative for blurry vision, photophobia or eye pain  ENT: see HPI  Respiratory: no cough, shortness of breath, or wheezing  Cardiovascular: no chest pain or dyspnea on exertion  Gastrointestinal: no abdominal pain, change in bowel habits, or black/ bloody stools  Musculoskeletal: negative for gait disturbance or muscular weakness  Neurological: no syncope or seizures; no ataxia  Dermatological: negative for puritis,  rash and jaundice  Hematologic/lymphatic: no easy bruising, no new lumps or bumps      Physical Exam:    Vitals:    09/21/18 0929   BP: (!) 148/73   Pulse: 73       Constitutional: Well appearing / communicating without difficutly.  NAD.  Eyes: EOM I Bilaterally  Head/Face: Normocephalic.  Negative paranasal sinus pressure/tenderness.  Salivary glands WNL.  House Brackmann I Bilaterally.    Right Ear: Auricle normal appearance. External Auditory Canal within normal limits,TM:  effusion resolved. TM mobility limited.    Left Ear: Auricle normal appearance. External Auditory Canal WNL,TM retracted. TM mobility limited.  Nose: No gross nasal septal deviation. Inferior Turbinates  edematous 3+ bilaterally. No septal perforation. No masses/lesions. External nasal skin appears normal without masses/lesions. Thick purulent crusts resolved.   Oral Cavity: Gingiva/lips within normal limits.  Dentition/gingiva healthy appearing. Mucus membranes moist. Floor of mouth soft, no masses palpated. Oral Tongue mobile. Hard Palate appears normal.    Oropharynx: Base of tongue appears normal. No masses/lesions noted. Tonsillar fossa/pharyngeal wall without lesions. Posterior oropharynx WNL.  Soft palate without masses. Midline uvula.   Neck/Lymphatic: No LAD I-VI bilaterally.  No thyromegaly.  No masses noted on exam.    Mirror laryngoscopy/nasopharyngoscopy: Active gag reflex.  Unable to perform.    Neuro/Psychiatric: AOx3.  Normal mood and affect.   Cardiovascular: Normal carotid pulses bilaterally, no increasing jugular venous distention noted at cervical region bilaterally.    Respiratory: Normal respiratory effort, no stridor, no retractions noted.       Assessment:    ICD-10-CM ICD-9-CM    1. Other chronic sinusitis J32.8 473.8    2. Polyp of paranasal sinus J33.8 471.8    3. ETD (Eustachian tube dysfunction), bilateral H69.83 381.81    4. Non-seasonal allergic rhinitis, unspecified trigger J30.89 477.8      The primary encounter diagnosis was Other chronic sinusitis. Diagnoses of Polyp of paranasal sinus, ETD (Eustachian tube dysfunction), bilateral, and Non-seasonal allergic rhinitis, unspecified trigger were also pertinent to this visit.      Plan:    CRS with polyposis:  Continue saline rinses BID to TID. Continue Omnaris and Singulair.     ETD/OMEresolved.     Follow up in 4-6 months or sooner if symptoms develop.      .Rosangela Isabel MD

## 2018-10-04 NOTE — PROGRESS NOTES
[facility] New Patient Evaluation    Referred by: PAULA Casillas MD   Reason for referral: Spinal stenosis of lumbar region, unspecified whether neurogenic claudication present       CC:   Chief Complaint   Patient presents with    Low-back Pain       Last 3 PDI Scores 10/5/2018   Pain Disability Index (PDI) 31       HPI: Sussy Costa is a 82 y.o. female referred for back pain with radiculopathy.    Location: Low Back  Severity: Currently: 8/10   Typical Range: 8/10     Exacerbation: 10/10   Onset: Years and has gotten worse   Quality: Aching  Radiation: right postero-lateral thigh and leg to lateral foot  Axial/Extremity Percentage of Pain: 30/70  Exacerbating Factors: house cleaning  Mitigating Factors: heat and rest  Assoc: denies night fever/night sweats, urinary incontinence, bowel incontinence, significant weight loss, significant motor weakness, loss of sensations    Previous Therapies:  PT:   HEP:   TENS:  Injections:   Surgery:  Medications:   - NSAIDS:   - MSK Relaxants:   - TCAs:   - SNRIs:   - Topicals:   - Anticonvulsants:  - Opioids:     Current Pain Medications:    1. Tylenol Arthritis      Full Medication List:    Current Outpatient Medications:     acetaminophen (TYLENOL EXTRA STRENGTH) 500 MG tablet, Take 1,000 mg by mouth 2 (two) times daily as needed for Pain. , Disp: , Rfl:     ascorbic acid (VITAMIN C) 100 MG tablet, Take 100 mg by mouth once daily., Disp: , Rfl:     atorvastatin (LIPITOR) 20 MG tablet, TAKE ONE TABLET BY MOUTH IN THE EVENING FOR CHOLESTEROL, Disp: 90 tablet, Rfl: 3    blood sugar diagnostic Strp, 1 strip by Misc.(Non-Drug; Combo Route) route once daily., Disp: 100 strip, Rfl: 11    blood-glucose meter kit, Use as instructed, Disp: 1 each, Rfl: 0    fluocinonide (LIDEX) 0.05 % gel, Apply 1 application topically 2 (two) times daily.  , Disp: , Rfl:     fluticasone-salmeterol 500-50 mcg/dose (ADVAIR) 500-50 mcg/dose DsDv diskus inhaler, Inhale 1 puff  into the lungs 2 (two) times daily., Disp: 180 each, Rfl: 3    furosemide (LASIX) 20 MG tablet, TAKE ONE TABLET BY MOUTH EVERY OTHER DAY , Disp: 15 tablet, Rfl: 1    glimepiride (AMARYL) 2 MG tablet, TAKE ONE TABLET BY MOUTH IN THE MORNING WITH BREAKFAST, Disp: 90 tablet, Rfl: 3    lancets (MICROLET LANCET) Misc, Check blood sugar 2 times daily, Disp: 100 each, Rfl: 3    latanoprost (XALATAN) 0.005 % ophthalmic solution, Inject into the eye. 1 Drops Ophthalmic Every evening, Disp: , Rfl:     levalbuterol (XOPENEX) 0.63 mg/3 mL nebulizer solution, Take 3 mLs (0.63 mg total) by nebulization 3 (three) times daily as needed for Wheezing., Disp: 36 mL, Rfl: 6    lisinopril 10 MG tablet, Take 2 tablets (20 mg total) by mouth once daily., Disp: 180 tablet, Rfl: 3    magnesium 30 mg Tab, Take 1 tablet by mouth once daily., Disp: , Rfl:     meclizine (ANTIVERT) 12.5 mg tablet, Take 1 tablet (12.5 mg total) by mouth 2 (two) times daily as needed., Disp: 30 tablet, Rfl: 0    metFORMIN (GLUCOPHAGE-XR) 500 MG 24 hr tablet, take one tablet by mouth daily with breakfast, Disp: 90 tablet, Rfl: 4    montelukast (SINGULAIR) 10 mg tablet, Take 1 tablet (10 mg total) by mouth nightly., Disp: 90 tablet, Rfl: 3    NEBULIZER ACCESSORIES (NEBULIZER MISC), , Disp: , Rfl:     olopatadine (PATANOL) 0.1 % ophthalmic solution, Place 1 drop into both eyes. 1 Drops Ophthalmic Twice a day , Disp: , Rfl:     OMNARIS 50 mcg Spry, 2 sprays by Each Nare route once daily., Disp: 12.5 g, Rfl: 6    predniSONE (DELTASONE) 20 MG tablet, Take 3 tab in am x 3d, then 2 tab in am x 3d, then 1 tab in am x 3d, then 1/2 tab in am x 3d, Disp: 21 tablet, Rfl: 0    risedronate (ACTONEL) 35 MG tablet, Take 1 tablet (35 mg total) by mouth every 7 days., Disp: 12 tablet, Rfl: 3    SODIUM CHLORIDE/SODIUM BICARB (NEILMED SINUS RINSE COMPLETE NASL), by Nasal route 2 (two) times daily. Uses in distilled water., Disp: , Rfl:     VENTOLIN HFA 90  mcg/actuation inhaler, INHALE 2 PUFFS INTO THE LUNGS EVERY 6 HOURS AS NEEDED, Disp: 18 g, Rfl: 5    vitamin D 185 MG Tab, Take 185 mg by mouth once daily.  , Disp: , Rfl:      Review of Systems:  Review of Systems   Constitutional: Negative for chills and fever.   HENT: Negative for nosebleeds.    Eyes: Negative for pain.   Respiratory: Negative for hemoptysis.    Cardiovascular: Negative for chest pain.   Gastrointestinal: Negative for nausea and vomiting.   Genitourinary: Negative for dysuria.   Musculoskeletal: Positive for back pain. Negative for falls.   Skin: Negative for rash.   Neurological: Positive for focal weakness (rare weakness in RLE with knee extension).   Endo/Heme/Allergies: Does not bruise/bleed easily.   Psychiatric/Behavioral: Negative for depression. The patient is not nervous/anxious.        Allergies:  Aspirin; Aspirin; Nsaids (non-steroidal anti-inflammatory drug); and Penicillin g     Medical History:  Past Medical History:   Diagnosis Date    Asthma     Cyst of pancreas     liver    Diabetes mellitus type II     Eczema of hand     Glaucoma     Hyperlipidemia     Hypertension     Lichen planus     gums    Osteoporosis     Pulmonary nodules         Surgical History:  Past Surgical History:   Procedure Laterality Date    CATARACT EXTRACTION, BILATERAL      CHOLECYSTECTOMY      HYSTERECTOMY      INJECTION-JOINT Right 7/10/2014    [facility]    nasal polyps      ULTRASOUND-ENDOSCOPIC-UPPER N/A 3/18/2016    Performed by Max Rosado MD at [facility] ENDO ([address] [place] [address])        Social History:  Social History     Socioeconomic History    Marital status:      Spouse name: sania    Number of children: 1    Years of education: Not on file    Highest education level: Not on file   Social Needs    Financial resource strain: Not on file    Food insecurity - worry: Not on file    Food insecurity - inability: Not on file    Transportation needs  - medical: Not on file    Transportation needs - non-medical: Not on file   Occupational History    Occupation:    Tobacco Use    Smoking status: Never Smoker    Smokeless tobacco: Never Used   Substance and Sexual Activity    Alcohol use: Yes     Comment: socially    Drug use: No    Sexual activity: Yes     Partners: Male     Birth control/protection: Post-menopausal   Other Topics Concern    Are you pregnant or think you may be? Not Asked    Breast-feeding Not Asked   Social History Narrative    She does not exercise regularly.       Physical Exam:  Vitals:    10/05/18 1423   BP: (!) 171/81   Pulse: 90   Weight: 62.6 kg (138 lb)   PainSc:   8     General    Nursing note and vitals reviewed.  Constitutional: She is oriented to person, place, and time. She appears well-developed and well-nourished. No distress.   HENT:   Head: Normocephalic and atraumatic.   Nose: Nose normal.   Eyes: Conjunctivae and EOM are normal. Pupils are equal, round, and reactive to light. Right eye exhibits no discharge. Left eye exhibits no discharge. No scleral icterus.   Neck: No JVD present.   Cardiovascular: Intact distal pulses.    Pulmonary/Chest: Effort normal. No respiratory distress.   Abdominal: She exhibits no distension.   Neurological: She is alert and oriented to person, place, and time. Coordination normal.   Psychiatric: She has a normal mood and affect. Her behavior is normal. Judgment and thought content normal.     General Musculoskeletal Exam   Gait: normal     Back (L-Spine & T-Spine) / Neck (C-Spine) Exam     Tenderness Right paramedian tenderness of the Lower L-Spine. Left paramedian tenderness of the Lower L-Spine.     Back (L-Spine & T-Spine) Range of Motion   Back extension: facet loading is positive and exacerabtes/reproduces the patient's typical low back pain    Back flexion: limited ROM but partial relief of low back pain noted.     Spinal Sensation   Right Side Sensation  L-Spine Level:  normal  Left Side Sensation  L-Spine Level: normal    Other She has no scoliosis .    Comments:  + SLR on right      Muscle Strength   Right Lower Extremity   Hip Flexion: 5/5   Hip Extensors: 5/5  Quadriceps:  5/5   Hamstrin/5   Gastrocsoleus:  5/5/5  Left Lower Extremity   Hip Flexion: 5/5   Hip Extensors: 5/5  Quadriceps:  5/5   Hamstrin/5   Gastrocsoleus:  5/5/5    Reflexes     Left Side  Quadriceps:  2+  Achilles:  2+    Right Side   Quadriceps:  2+  Achilles:  2+      Imaging:  MRI Lumbar Spine Without Contrast   Reading Physician Reading Date Result Priority   Titus Sheth MD 2018    Barrett Akbar MD 2018       Narrative     EXAMINATION:  MRI LUMBAR SPINE WITHOUT CONTRAST    CLINICAL HISTORY:  Low back pain, risk factors (osteoporosis or chronic steroid use or elderly);Spinal stenosis, lumbar; Low back pain    TECHNIQUE:  Multiplanar, multisequence MR images were acquired from the thoracolumbar junction to the sacrum without the administration of contrast.    COMPARISON:  MRI lumbar spine 2014.    FINDINGS:  Alignment: Grade 1 anterolisthesis of L4 on L5.  Alignment is otherwise appropriate.    Vertebrae: Normal marrow signal. No fracture.    Discs: Mild disc space height narrowing at L4-5.    Cord: Normal.  Conus terminates at L1.    Degenerative findings:    T12-L1: No significant central canal stenosis or neural foraminal narrowing.    L1-L2: Broad-based disc bulge with no significant central canal stenosis or neural foraminal narrowing.    L2-L3: No significant central canal stenosis or neural foraminal narrowing.    L3-L4: Left-sided asymmetric disc bulge resulting in mild spinal canal stenosis and mild left neural foraminal narrowing.    L4-L5: Broad-based disc bulge and left foraminal zone disc extrusion along with moderate bilateral facet arthropathy result in severe spinal canal stenosis and mild right and severe left neural foraminal narrowing.    L5-S1: Broad-based  disc bulge and facet arthropathy result in mild bilateral neural foraminal narrowing.    Paraspinal muscles & soft tissues: Unremarkable.         Labs:  BMP  Lab Results   Component Value Date     08/23/2018    K 3.8 08/23/2018     08/23/2018    CO2 29 08/23/2018    BUN 15 08/23/2018    CREATININE 0.9 08/23/2018    CALCIUM 10.3 08/23/2018    ANIONGAP 8 08/23/2018    ESTGFRAFRICA >60.0 08/23/2018    EGFRNONAA >60.0 08/23/2018     Lab Results   Component Value Date    ALT 11 02/21/2018    AST 17 02/21/2018    ALKPHOS 57 02/21/2018    BILITOT 0.6 02/21/2018       Assessment:  Problem List Items Addressed This Visit     Spinal stenosis of lumbar region    Relevant Orders    Ambulatory Referral to Physical/Occupational Therapy    Lumbar radiculopathy - Primary    Relevant Orders    Ambulatory Referral to Physical/Occupational Therapy    Anterolisthesis    Lumbar spondylosis          This is a pleasant 82-year-old female who presents with her  for evaluation of chronic low back pain with right-sided radiculopathy.  MRI shows severe stenosis at L4-5 due to a combination of degenerative disc disease and facet arthropathy.  There is also contribution from anterolisthesis of L4 on L5 with partial unroofing of the posterior aspect of the L4-5 intervertebral disc.  She describes symptoms consistent with neurogenic claudication and lumbar radiculopathy and I believe she would benefit from a combination of physical therapy and a lumbar epidural steroid injection at L5-S1. She is on chronic steroid therapy for treatment of her asthma and I have recommended limiting epidural steroid injections to no more than 3 times per year, but ideally no more than twice per year.  Her decision to repeat the injection the future will depend on her response to the 1st.    Treatment Plan:   PT/OT/HEP: Ext ref to PT placed today for spine program and back rehabilitation.  Patient educated on importance of PT and HEP.  Procedures:  LESI @ L5-S1  Medications: No changes recommended at this time.  We may consider additional injections.  Imaging: No additional imaging recommended at this time.  Labs: Reviewed.  Medications are appropriately dosed for current hepatorenal function.    Follow Up: RTC 4-6 weeks    Nicci Osorio Jr, MD  Interventional Pain Medicine / Anesthesiology    Disclaimer: This note was partly generated using dictation software which may occasionally result in transcription errors.

## 2018-10-05 ENCOUNTER — OFFICE VISIT (OUTPATIENT)
Dept: PAIN MEDICINE | Facility: CLINIC | Age: 83
End: 2018-10-05
Payer: MEDICARE

## 2018-10-05 ENCOUNTER — TELEPHONE (OUTPATIENT)
Dept: PAIN MEDICINE | Facility: CLINIC | Age: 83
End: 2018-10-05

## 2018-10-05 VITALS
HEART RATE: 90 BPM | SYSTOLIC BLOOD PRESSURE: 171 MMHG | WEIGHT: 138 LBS | BODY MASS INDEX: 22.96 KG/M2 | DIASTOLIC BLOOD PRESSURE: 81 MMHG

## 2018-10-05 DIAGNOSIS — M47.816 LUMBAR SPONDYLOSIS: ICD-10-CM

## 2018-10-05 DIAGNOSIS — M54.16 LUMBAR RADICULOPATHY: Primary | ICD-10-CM

## 2018-10-05 DIAGNOSIS — M48.062 SPINAL STENOSIS OF LUMBAR REGION WITH NEUROGENIC CLAUDICATION: ICD-10-CM

## 2018-10-05 DIAGNOSIS — M43.10 ANTEROLISTHESIS: ICD-10-CM

## 2018-10-05 PROCEDURE — 99204 OFFICE O/P NEW MOD 45 MIN: CPT | Mod: S$PBB,,, | Performed by: PAIN MEDICINE

## 2018-10-05 PROCEDURE — 99213 OFFICE O/P EST LOW 20 MIN: CPT | Mod: PBBFAC,PO | Performed by: PAIN MEDICINE

## 2018-10-05 PROCEDURE — 99999 PR PBB SHADOW E&M-EST. PATIENT-LVL III: CPT | Mod: PBBFAC,,, | Performed by: PAIN MEDICINE

## 2018-10-05 NOTE — LETTER
October 5, 2018      PAULA Casillas MD  [address] [facility] [address]           [facility]  [address]  [facility] [place] [address]  Phone: [phone]          Patient: Sussy Costa   MR Number: [id number]   YOB: 1935   Date of Visit: 10/5/2018       Dear Dr. PAULA Casillas:    Thank you for referring Sussy Costa to me for evaluation. Attached you will find relevant portions of my assessment and plan of care.    If you have questions, please do not hesitate to call me. I look forward to following Sussy Costa along with you.    Sincerely,    Nicci Osorio Jr., MD    Enclosure  CC:  No Recipients    If you would like to receive this communication electronically, please contact [email or web address] or [phone] to request more information on [facility] access.    For providers and/or their staff who would like to refer a patient to [facility], please contact us through our one-stop-shop provider referral line, [facility], at [phone].    If you feel you have received this communication in error or would no longer like to receive these types of communications, please e-mail [email or web address]

## 2018-10-08 ENCOUNTER — TELEPHONE (OUTPATIENT)
Dept: PAIN MEDICINE | Facility: CLINIC | Age: 83
End: 2018-10-08

## 2018-10-08 NOTE — TELEPHONE ENCOUNTER
----- Message from Lorena Carrion sent at 10/8/2018  8:17 AM CDT -----  Contact: [phone]/self  Patient requesting to speak with you regarding rescheduling her procedure. Please advise.

## 2018-10-08 NOTE — TELEPHONE ENCOUNTER
Spoke with pt in regards to rescheduling her procedure. Pt stated she wanted to reschedule her procedure from 10/18 to 10/25. Pt's procedure as rescheduled for 10/25/18. Pt verbalized understanding and confirmed procedure date.

## 2018-10-12 ENCOUNTER — OFFICE VISIT (OUTPATIENT)
Dept: INTERNAL MEDICINE | Facility: CLINIC | Age: 83
End: 2018-10-12
Payer: MEDICARE

## 2018-10-12 VITALS
HEIGHT: 65 IN | OXYGEN SATURATION: 96 % | WEIGHT: 141.13 LBS | HEART RATE: 76 BPM | BODY MASS INDEX: 23.52 KG/M2 | TEMPERATURE: 98 F | RESPIRATION RATE: 18 BRPM | DIASTOLIC BLOOD PRESSURE: 76 MMHG | SYSTOLIC BLOOD PRESSURE: 132 MMHG

## 2018-10-12 DIAGNOSIS — I15.2 HYPERTENSION ASSOCIATED WITH DIABETES: Primary | ICD-10-CM

## 2018-10-12 DIAGNOSIS — E11.59 HYPERTENSION ASSOCIATED WITH DIABETES: Primary | ICD-10-CM

## 2018-10-12 DIAGNOSIS — E11.9 DIABETES MELLITUS WITHOUT COMPLICATION: ICD-10-CM

## 2018-10-12 PROCEDURE — 99999 PR PBB SHADOW E&M-EST. PATIENT-LVL III: CPT | Mod: PBBFAC,,, | Performed by: INTERNAL MEDICINE

## 2018-10-12 PROCEDURE — 99213 OFFICE O/P EST LOW 20 MIN: CPT | Mod: PBBFAC,PO,25 | Performed by: INTERNAL MEDICINE

## 2018-10-12 PROCEDURE — 99214 OFFICE O/P EST MOD 30 MIN: CPT | Mod: S$PBB,,, | Performed by: INTERNAL MEDICINE

## 2018-10-12 PROCEDURE — 90662 IIV NO PRSV INCREASED AG IM: CPT | Mod: PBBFAC,PO

## 2018-10-12 RX ORDER — LISINOPRIL 40 MG/1
40 TABLET ORAL DAILY
Qty: 90 TABLET | Refills: 3 | Status: SHIPPED | OUTPATIENT
Start: 2018-10-12 | End: 2019-01-18 | Stop reason: ALTCHOICE

## 2018-10-17 ENCOUNTER — TELEPHONE (OUTPATIENT)
Dept: OTOLARYNGOLOGY | Facility: CLINIC | Age: 83
End: 2018-10-17

## 2018-10-17 ENCOUNTER — OFFICE VISIT (OUTPATIENT)
Dept: OTOLARYNGOLOGY | Facility: CLINIC | Age: 83
End: 2018-10-17
Payer: MEDICARE

## 2018-10-17 VITALS
DIASTOLIC BLOOD PRESSURE: 81 MMHG | BODY MASS INDEX: 23.42 KG/M2 | TEMPERATURE: 98 F | SYSTOLIC BLOOD PRESSURE: 151 MMHG | HEIGHT: 65 IN | HEART RATE: 78 BPM | WEIGHT: 140.56 LBS

## 2018-10-17 DIAGNOSIS — J33.8 POLYP OF PARANASAL SINUS: ICD-10-CM

## 2018-10-17 DIAGNOSIS — J32.8 OTHER CHRONIC SINUSITIS: Primary | ICD-10-CM

## 2018-10-17 DIAGNOSIS — J30.89 NON-SEASONAL ALLERGIC RHINITIS, UNSPECIFIED TRIGGER: ICD-10-CM

## 2018-10-17 PROCEDURE — 99214 OFFICE O/P EST MOD 30 MIN: CPT | Mod: 25,S$PBB,, | Performed by: OTOLARYNGOLOGY

## 2018-10-17 PROCEDURE — 99999 PR PBB SHADOW E&M-EST. PATIENT-LVL V: CPT | Mod: PBBFAC,,, | Performed by: OTOLARYNGOLOGY

## 2018-10-17 PROCEDURE — 99215 OFFICE O/P EST HI 40 MIN: CPT | Mod: PBBFAC,PN | Performed by: OTOLARYNGOLOGY

## 2018-10-17 PROCEDURE — 31231 NASAL ENDOSCOPY DX: CPT | Mod: S$PBB,,, | Performed by: OTOLARYNGOLOGY

## 2018-10-17 PROCEDURE — 31231 NASAL ENDOSCOPY DX: CPT | Mod: PBBFAC,PN | Performed by: OTOLARYNGOLOGY

## 2018-10-17 RX ORDER — LEVOFLOXACIN 500 MG/1
500 TABLET, FILM COATED ORAL DAILY
Qty: 7 TABLET | Refills: 0 | Status: SHIPPED | OUTPATIENT
Start: 2018-10-17 | End: 2018-10-24

## 2018-10-17 NOTE — TELEPHONE ENCOUNTER
Spoke with patient she was notified Dr Colin had cancellation at 10:00 today. Scheduled patient for this date and time

## 2018-10-17 NOTE — PROGRESS NOTES
Chief Complaint   Patient presents with    Follow-up    Sinusitis     post nasal drip, mid face pain and pressure   .    HPI:     Sussy Costa is a 83 y.o. female who is known to me from my previous practice with chronic sinusitis who presents today with right otalgia for 2 weeks. She reports that the pain in mild in nature and describes it as a sharp intermittent pain.  She states it is gradually worsening.  She notes right aural fullness with muffled hearing in the right ear only.  She reports that she is not having any nasal congestion, facial pain or pressure, rhinorrhea, or post-nasal drip at present.  She using nasal rinses.  She continues on Singulair, Omnaris.     Interval HPI 10/17/2018:   F/u CRS with history of nasal polyposis.  She reports that nasal congestion, facial pain and pressure, and thickened rhinorrhea/post-nasal drip is worsened.   She states that she has been on Singulair and Omnaris without relief. She has not noted any exacerbating factors.     Past Medical History:   Diagnosis Date    Asthma     Cyst of pancreas     liver    Diabetes mellitus type II     Eczema of hand     Glaucoma     Hyperlipidemia     Hypertension     Lichen planus     gums    Osteoporosis     Pulmonary nodules      Social History     Socioeconomic History    Marital status:      Spouse name: sania    Number of children: 1    Years of education: Not on file    Highest education level: Not on file   Social Needs    Financial resource strain: Not on file    Food insecurity - worry: Not on file    Food insecurity - inability: Not on file    Transportation needs - medical: Not on file    Transportation needs - non-medical: Not on file   Occupational History    Occupation:    Tobacco Use    Smoking status: Never Smoker    Smokeless tobacco: Never Used   Substance and Sexual Activity    Alcohol use: Yes     Comment: socially    Drug use: No    Sexual activity: Yes      Partners: Male     Birth control/protection: Post-menopausal   Other Topics Concern    Are you pregnant or think you may be? Not Asked    Breast-feeding Not Asked   Social History Narrative    She does not exercise regularly.     Past Surgical History:   Procedure Laterality Date    CATARACT EXTRACTION, BILATERAL      CHOLECYSTECTOMY      HYSTERECTOMY      INJECTION-JOINT Right 7/10/2014    [facility]    nasal polyps      ULTRASOUND-ENDOSCOPIC-UPPER N/A 3/18/2016    Performed by Max Rosado MD at [facility] ([address] [facility])     Family History   Problem Relation Age of Onset    Heart disease Father     Heart disease Brother     Hypertension Brother            Review of Systems  General: negative for chills, fever or weight loss  Psychological: negative for mood changes or depression  Ophthalmic: negative for blurry vision, photophobia or eye pain  ENT: see HPI  Respiratory: no cough, shortness of breath, or wheezing  Cardiovascular: no chest pain or dyspnea on exertion  Gastrointestinal: no abdominal pain, change in bowel habits, or black/ bloody stools  Musculoskeletal: negative for gait disturbance or muscular weakness  Neurological: no syncope or seizures; no ataxia  Dermatological: negative for puritis,  rash and jaundice  Hematologic/lymphatic: no easy bruising, no new lumps or bumps      Physical Exam:    Vitals:    10/17/18 0949   BP: (!) 151/81   Pulse: 78   Temp: 98.1 °F (36.7 °C)       Constitutional: Well appearing / communicating without difficutly.  NAD.  Eyes: EOM I Bilaterally  Head/Face: Normocephalic.  Negative paranasal sinus pressure/tenderness.  Salivary glands WNL.  House Brackmann I Bilaterally.    Right Ear: Auricle normal appearance. External Auditory Canal within normal limits,TM:  effusion resolved. TM mobility limited.    Left Ear: Auricle normal appearance. External Auditory Canal WNL,TM retracted. TM mobility limited.  Nose: No gross nasal  septal deviation. Inferior Turbinates edematous 3+ bilaterally. No septal perforation. No masses/lesions. External nasal skin appears normal without masses/lesions. Thick purulent crusts resolved.   Oral Cavity: Gingiva/lips within normal limits.  Dentition/gingiva healthy appearing. Mucus membranes moist. Floor of mouth soft, no masses palpated. Oral Tongue mobile. Hard Palate appears normal.    Oropharynx: Base of tongue appears normal. No masses/lesions noted. Tonsillar fossa/pharyngeal wall without lesions. Posterior oropharynx WNL.  Soft palate without masses. Midline uvula.   Neck/Lymphatic: No LAD I-VI bilaterally.  No thyromegaly.  No masses noted on exam.    Mirror laryngoscopy/nasopharyngoscopy: Active gag reflex.  Unable to perform.    Neuro/Psychiatric: AOx3.  Normal mood and affect.   Cardiovascular: Normal carotid pulses bilaterally, no increasing jugular venous distention noted at cervical region bilaterally.    Respiratory: Normal respiratory effort, no stridor, no retractions noted.       Assessment:    ICD-10-CM ICD-9-CM    1. Other chronic sinusitis J32.8 473.8    2. Polyp of paranasal sinus J33.8 471.8    3. Non-seasonal allergic rhinitis, unspecified trigger J30.89 477.8      The primary encounter diagnosis was Other chronic sinusitis. Diagnoses of Polyp of paranasal sinus and Non-seasonal allergic rhinitis, unspecified trigger were also pertinent to this visit.      Plan:    CRS with polyposis: Levaquin 500mg PO daily for 7 days.  I prescribed the daily use of compounded isotonic saline irrigation to treat her recalcitrant sinonasal inflammation.  She was counseled on the off-label nature of this therapy and she consented to its use. Continue Singulair. Discontinue Omnaris. If no improvement on follow up CT scan of the sinuses may be warranted for further evaluation of polyposis.     ETD/OMEresolved.     Follow up in 4 weeks.     .Rosangela Isabel MD

## 2018-10-17 NOTE — TELEPHONE ENCOUNTER
----- Message from Sharee Amanda sent at 10/17/2018  7:31 AM CDT -----  No. [phone]   Patient has mucus in her throat.  She would like an appointment today.

## 2018-10-17 NOTE — PROCEDURES
Procedures       PROCEDURE NOTE:  Nasal endoscopy   Preprocedure diagnosis:  Chronic sinusitis  Postprocedure diangosis:  Same  Complications:  None  Blood Loss:  None    Procedure in detail:  After verbal consent was obtained, the patient's nasal cavity was anesthesized using topical 1%lidocaine and Neosynepherine.  A rigid 0 degree endoscope was placed in first the right, then the left nasal cavity.  The inferior and middle turbinates were examined, and found to be edematous bilaterally.  The middle meatus and maxillary antrum was also examined, and found to have thick crusting and polypoid mucosa/polyps  present bilaterally.  No masses seen.  The patient tolerated the procedure well and there were no complications.

## 2018-10-18 ENCOUNTER — TELEPHONE (OUTPATIENT)
Dept: PAIN MEDICINE | Facility: CLINIC | Age: 83
End: 2018-10-18

## 2018-10-18 NOTE — PROGRESS NOTES
History of present illness:  83-year-old lady with hypertension, diabetes mellitus dyslipidemia and others following on a couple of those chronic issues primarily hypertension today.  The patient brings in her home blood pressure readings and her home blood pressure monitor for review. Patient reports she is doing well with no chest pain palpitations syncope cough shortness of breath claudication.    Current medications:  Medications noted and reviewed in the electronic medical record medication list.    Review of systems:  Constitutional:  No fever no chills and generalized body aches.  Cardiovascular:  No chest pain no palpitations no syncope no claudication and no significant issues with lower extremity edema    Past medical history, past surgical history, family medical history social history is are all noted and reviewed the electronic medical record history sections.    Physical examination:  General:  Alert pleasant appropriately groomed lady no acute distress.  Vital signs:  Blood pressure taken manually by this examiner 168/88.  Blood pressures taken with the patient's monitor here today 179/80, 184/89.  Reviewed home readings for the last couple of months.  Systolic blood pressures are consistently in the 150-160 range.  HEENT:  Normocephalic.  Neck supple no masses no thyromegaly.  Lungs:  Clear to auscultation.  Cardiovascular:  Regular rate and rhythm. No significant murmur.  Carotids full bilaterally trace distal lower extremity edema. No ischemic changes of the lower extremities  Mental status:  Alert oriented affect mood all appropriate.    Data:  Lab data noted in reviewed from August of this year.    Impression:  Hypertension, systolic control is not optimal.  Noted that the patient did not tolerate amlodipine previously due to lower extremity edema.  Diabetes mellitus controlled.    Plan:  Increase lisinopril to 40 mg daily.  Return to clinic 6 weeks.  Influenza vaccine today

## 2018-10-18 NOTE — TELEPHONE ENCOUNTER
----- Message from Sil Randolph sent at 10/18/2018 10:34 AM CDT -----  Contact: self, [phone]  Patient requests to confirm her procedure was moved to 11/8.

## 2018-10-22 NOTE — DISCHARGE INSTRUCTIONS
Home Care Instructions Pain Management:    1.  DIET:    You may resume your normal diet today.    2.  BATHING:    You may shower with luke warm water.    3.  DRESSING:    You may remove your bandage today.    4.  ACTIVITY LEVEL:      You may resume your normal activities 24 hours after your procedure.    5.  MEDICATIONS:    You may resume your normal medications today.    6.  SPECIAL INSTRUCTIONS:    No heat to the injection site for 24 hours including bath or shower, heating pad, moist heat or hot tubs.    Use an ice pack to the injection site for any pain or discomfort.  Apply ice packs for 20 minute intervals as needed.    If you have received any sedatives by mouth today, you can not drive for 12 hours.    If you have received sedation through an IV, you can not drive for 24 hours.    PLEASE CALL YOUR DOCTOR FOR THE FOLLOWIN.  Redness or swelling around the injection site.  2.  Fever of 101 degrees.  3.  Drainage (pus) from the injection site.  4.  For any continuous bleeding (some dried blood over the incision is normal.)    FOR EMERGENCIES:    If any unusual problems or difficulties occur during clinic hours, call [phone] or dial [phone].    Follow up with with your physician in 2-3 weeks.       Procedural Sedation  Procedural sedation is medicine to ease discomfort, pain, and anxiety during a procedure. The medicine is often given through an IV (intravenous) line in your arm or hand. In some cases, the medicine may be taken by mouth or inhaled. While you are under sedation, you will likely be awake. But you may not remember anything afterward.  Why procedural sedation is used  Sedation is used for many types of procedures. The goal is to reduce pain, anxiety, and stressful memories of a procedure. It can also help your healthcare provider treat you. For example, having a broken bone fixed may be easier if you feel relaxed.  Procedural sedation is used only for short, basic procedures. It is not used  for complex surgeries. Some procedures that use this type of sedation include:  · Dental surgery  · Breast biopsy, to take a sample of breast tissue  · Endoscopy, to look at gastrointestinal problems  · Bronchoscopy, to check for lung problems  · Bone or joint realignment, to fix a broken bone or dislocated joint  · Minor foot or skin surgery  · Electrical cardioversion, to restore a normal heart rhythm  · Lumbar puncture, to assess neurological disease  Risks of procedural sedation  Procedural sedation has some risks and possible side effects, such as:  · Headache  · Nausea and vomiting  · Unpleasant memory of the procedure  · Lowered rate of breathing  · Changes in heart rate and blood pressure (rare)  · Inhalation of stomach contents into your lungs (rare)  Side effects will likely go away shortly after the procedure. Your healthcare team will watch your heart rate and breathing during your sedation. This is to help prevent problems.  Your own risks may vary based on your age and your overall health. They also depend on the type of sedation you are given. Talk with your healthcare provider about the risks that apply most to you.  Getting ready for procedural sedation  Talk with your healthcare provider about how to get ready for your procedure. Tell him or her about all the medicines you take. This includes over-the-counter medicines such as ibuprofen. It also includes vitamins, herbs, and other supplements. You may need to stop taking some medicines before the procedure, such as blood thinners and aspirin. If you smoke, you should stop to lessen the chance of a lung issue. Talk with your healthcare provider if you need help to stop smoking.  Tell your healthcare provider if you:  · Have had any problems in the past with sedation or anesthesia  · Have had any recent changes in your health, such as an infection or fever  · Are pregnant or think you may be pregnant  Also be sure to:  · Ask a family member or friend  to take you home after the procedure. You cant drive on the day you receive sedation.  · Not eat or drink after midnight the night before your procedure, if advised.  · Not plan on making any important decisions, such as financial or legal, for the day after you receive the sedation.  · Follow all other instructions from your healthcare provider.  During your procedural sedation  You may have your procedure in a hospital or a medical clinic. Sedation is done by a trained healthcare provider. In general, you can expect the following:  · You will be given medicine through an IV line in your arm or hand. Or you may receive a shot. The medicine may also be given by mouth. Or you may inhale it through a mask.  · If you receive medicine through an IV, you may feel the effects very quickly. You will start to feel relaxed and drowsy.  · During the procedure, your heart rate, breathing, and blood pressure will be closely watched. Your breathing and blood pressure may decrease a little. But you will likely not need help with your breathing. You may receive a little extra oxygen through a mask or through some soft plastic prongs underneath your nose.  · You will probably be awake the entire time. If you do fall asleep, you should be easy to wake up, if needed. You should feel little or no pain.  · When your procedure is over, the sedative medicine will be stopped.  After your procedural sedation  You will begin to feel more awake and aware. But you will likely be drowsy for a while afterward. You will be closely watched as you become more alert. You may have a faint memory of the procedure. Or you may not remember it at all.  You should be able to return home within an hour or two after your procedure. Plan to have someone stay with you for a few hours. Side effects such as headache and nausea may go away quickly. Tell your healthcare provider if they continue.  Dont drive or make any important decisions for at least 24  hours. Be sure to follow all after-care instructions.     When to call your healthcare provider  Have someone call your healthcare provider right away if you have any of these:  · Drowsiness that gets worse  · Weakness or dizziness that gets worse  · Repeated vomiting  · You cant be awakened  · Severe or ongoing pain from the procedure, not relieved by the pain medicine   Date Last Reviewed: 2/1/2017  © 0386-8111 [employer]. [address] [place] [address], [place] [address]. All rights reserved. This information is not intended as a substitute for professional medical care. Always follow your healthcare professional's instructions.

## 2018-10-24 ENCOUNTER — TELEPHONE (OUTPATIENT)
Dept: PAIN MEDICINE | Facility: CLINIC | Age: 83
End: 2018-10-24

## 2018-10-24 NOTE — TELEPHONE ENCOUNTER
----- Message from June Fernández sent at 10/24/2018 11:29 AM CDT -----  Contact: [phone]/ SELF   Pt called stating she reschedule her procedure for November and she just got a call stating her procedure its tomorrow . Please advise

## 2018-10-24 NOTE — TELEPHONE ENCOUNTER
Spoke with pt regarding her procedure scheduled for 10/25/18. Pt rescheduled her procedure for 11/8/18. I informed pt her procedure was rescheduled. Pt verbalized understanding and confirmed procedure date.

## 2018-10-31 RX ORDER — ALENDRONATE SODIUM 70 MG/1
TABLET ORAL
Qty: 12 TABLET | Refills: 0 | OUTPATIENT
Start: 2018-10-31

## 2018-11-03 ENCOUNTER — HOSPITAL ENCOUNTER (OUTPATIENT)
Dept: RADIOLOGY | Facility: HOSPITAL | Age: 83
Discharge: HOME | End: 2018-11-03
Attending: INTERNAL MEDICINE
Payer: MEDICARE

## 2018-11-03 ENCOUNTER — OFFICE VISIT (OUTPATIENT)
Dept: INTERNAL MEDICINE | Facility: CLINIC | Age: 83
End: 2018-11-03
Payer: MEDICARE
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
## 2018-11-03 PROCEDURE — 71046 X-RAY EXAM CHEST 2 VIEWS: CPT | Mod: 26,,, | Performed by: RADIOLOGY

## 2018-11-03 PROCEDURE — 71046 X-RAY EXAM CHEST 2 VIEWS: CPT | Mod: TC

## 2018-11-03 PROCEDURE — 99215 OFFICE O/P EST HI 40 MIN: CPT | Mod: PBBFAC,25 | Performed by: INTERNAL MEDICINE

## 2018-11-03 PROCEDURE — 99999 PR PBB SHADOW E&M-EST. PATIENT-LVL V: CPT | Mod: PBBFAC,,, | Performed by: INTERNAL MEDICINE

## 2018-11-03 PROCEDURE — 99213 OFFICE O/P EST LOW 20 MIN: CPT | Mod: S$PBB,,, | Performed by: INTERNAL MEDICINE

## 2018-11-07 VITALS
DIASTOLIC BLOOD PRESSURE: 84 MMHG | TEMPERATURE: 98 F | HEIGHT: 65 IN | BODY MASS INDEX: 23.32 KG/M2 | HEART RATE: 91 BPM | WEIGHT: 140 LBS | OXYGEN SATURATION: 98 % | SYSTOLIC BLOOD PRESSURE: 136 MMHG

## 2018-11-07 NOTE — PLAN OF CARE
Spoke with pt regarding arrival time, advised to arrive at 915. Pt confirmed.  Advised npo at midnight and hold asa and dm medication. Advised must have a ride to and from procedure.

## 2018-11-08 ENCOUNTER — HOSPITAL ENCOUNTER (OUTPATIENT)
Facility: HOSPITAL | Age: 83
Discharge: HOME | End: 2018-11-08
Attending: PAIN MEDICINE | Admitting: PAIN MEDICINE
Payer: MEDICARE

## 2018-11-08 VITALS
SYSTOLIC BLOOD PRESSURE: 166 MMHG | RESPIRATION RATE: 18 BRPM | TEMPERATURE: 98 F | HEART RATE: 91 BPM | OXYGEN SATURATION: 98 % | HEIGHT: 65 IN | WEIGHT: 139 LBS | DIASTOLIC BLOOD PRESSURE: 85 MMHG | BODY MASS INDEX: 23.16 KG/M2

## 2018-11-08 DIAGNOSIS — G89.29 CHRONIC PAIN: ICD-10-CM

## 2018-11-08 DIAGNOSIS — M54.16 LUMBAR RADICULOPATHY: Primary | ICD-10-CM

## 2018-11-08 LAB — POCT GLUCOSE: 158 MG/DL (ref 70–110)

## 2018-11-08 PROCEDURE — 25000003 PHARM REV CODE 250: Performed by: PAIN MEDICINE

## 2018-11-08 PROCEDURE — 25500020 PHARM REV CODE 255: Performed by: PAIN MEDICINE

## 2018-11-08 PROCEDURE — 63600175 PHARM REV CODE 636 W HCPCS: Performed by: PAIN MEDICINE

## 2018-11-08 PROCEDURE — 62323 NJX INTERLAMINAR LMBR/SAC: CPT | Mod: ,,, | Performed by: PAIN MEDICINE

## 2018-11-08 PROCEDURE — 62323 NJX INTERLAMINAR LMBR/SAC: CPT | Performed by: PAIN MEDICINE

## 2018-11-08 RX ORDER — METHYLPREDNISOLONE ACETATE 40 MG/ML
INJECTION, SUSPENSION INTRA-ARTICULAR; INTRALESIONAL; INTRAMUSCULAR; SOFT TISSUE
Status: DISCONTINUED | OUTPATIENT
Start: 2018-11-08 | End: 2018-11-08 | Stop reason: HOSPADM

## 2018-11-08 RX ORDER — LIDOCAINE HYDROCHLORIDE 10 MG/ML
INJECTION INFILTRATION; PERINEURAL
Status: DISCONTINUED | OUTPATIENT
Start: 2018-11-08 | End: 2018-11-08 | Stop reason: HOSPADM

## 2018-11-08 RX ORDER — ALPRAZOLAM 0.5 MG/1
0.5 TABLET, ORALLY DISINTEGRATING ORAL ONCE AS NEEDED
Status: COMPLETED | OUTPATIENT
Start: 2018-11-08 | End: 2018-11-08

## 2018-11-08 RX ADMIN — ALPRAZOLAM 0.5 MG: 0.5 TABLET, ORALLY DISINTEGRATING ORAL at 09:11

## 2018-11-08 NOTE — H&P
[facility]  History & Physical - Short Stay  Pain Management           SUBJECTIVE:     Procedure: Procedure(s) (LRB):  Injection-steroid-epidural-lumbar L5-S1 (N/A)    Chief Complaint/Reason for Admission:  Lumbar radiculopathy [M54.16]    PTA Medications   Medication Sig    acetaminophen (TYLENOL EXTRA STRENGTH) 500 MG tablet Take 1,000 mg by mouth 2 (two) times daily as needed for Pain.     ascorbic acid (VITAMIN C) 100 MG tablet Take 100 mg by mouth once daily.    atorvastatin (LIPITOR) 20 MG tablet TAKE ONE TABLET BY MOUTH IN THE EVENING FOR CHOLESTEROL    blood sugar diagnostic Strp 1 strip by Misc.(Non-Drug; Combo Route) route once daily.    blood-glucose meter kit Use as instructed    fluocinonide (LIDEX) 0.05 % gel Apply 1 application topically 2 (two) times daily.      furosemide (LASIX) 20 MG tablet TAKE ONE TABLET BY MOUTH EVERY OTHER DAY     glimepiride (AMARYL) 2 MG tablet TAKE ONE TABLET BY MOUTH IN THE MORNING WITH BREAKFAST    lancets (MICROLET LANCET) Misc Check blood sugar 2 times daily    latanoprost (XALATAN) 0.005 % ophthalmic solution Inject into the eye. 1 Drops Ophthalmic Every evening    levalbuterol (XOPENEX) 0.63 mg/3 mL nebulizer solution Take 3 mLs (0.63 mg total) by nebulization 3 (three) times daily as needed for Wheezing.    lisinopril (PRINIVIL,ZESTRIL) 40 MG tablet Take 1 tablet (40 mg total) by mouth once daily.    magnesium 30 mg Tab Take 1 tablet by mouth once daily.    meclizine (ANTIVERT) 12.5 mg tablet Take 1 tablet (12.5 mg total) by mouth 2 (two) times daily as needed.    metFORMIN (GLUCOPHAGE-XR) 500 MG 24 hr tablet take one tablet by mouth daily with breakfast    montelukast (SINGULAIR) 10 mg tablet Take 1 tablet (10 mg total) by mouth nightly.    NEBULIZER ACCESSORIES (NEBULIZER MISC)     olopatadine (PATANOL) 0.1 % ophthalmic solution Place 1 drop into both eyes. 1 Drops Ophthalmic Twice a day     OMNARIS 50 mcg Spry 2 sprays by Each  Nare route once daily.    risedronate (ACTONEL) 35 MG tablet Take 1 tablet (35 mg total) by mouth every 7 days.    SODIUM CHLORIDE/SODIUM BICARB (NEILMED SINUS RINSE COMPLETE NASL) by Nasal route 2 (two) times daily. Uses in distilled water.    VENTOLIN HFA 90 mcg/actuation inhaler INHALE 2 PUFFS INTO THE LUNGS EVERY 6 HOURS AS NEEDED    vitamin D 185 MG Tab Take 185 mg by mouth once daily.      fluticasone-salmeterol 500-50 mcg/dose (ADVAIR) 500-50 mcg/dose DsDv diskus inhaler Inhale 1 puff into the lungs 2 (two) times daily.       Review of patient's allergies indicates:   Allergen Reactions    Aspirin Other (See Comments)     Asthma    TRIAD OF NASAL POLYPS, ASA  ALLERGY AND ASTHMA.        Aspirin Other (See Comments)    Nsaids (non-steroidal anti-inflammatory drug) Other (See Comments)     AVOID DUE TO TRIAD OF ASA ALLERGY, NASAL POLYPS,AND ASTHMA    Penicillin g Other (See Comments)       Past Medical History:   Diagnosis Date    Asthma     Cyst of pancreas     liver    Diabetes mellitus type II     Eczema of hand     Glaucoma     Hyperlipidemia     Hypertension     Lichen planus     gums    Osteoporosis     Pulmonary nodules      Past Surgical History:   Procedure Laterality Date    CATARACT EXTRACTION, BILATERAL      CHOLECYSTECTOMY      HYSTERECTOMY      INJECTION-JOINT Right 7/10/2014    Performed by [facility] at [facility] M[facility]    nasal polyps      ULTRASOUND-ENDOSCOPIC-UPPER N/A 3/18/2016    [facility]     Family History   Problem Relation Age of Onset    Heart disease Father     Heart disease Brother     Hypertension Brother      Social History     Tobacco Use    Smoking status: Never Smoker    Smokeless tobacco: Never Used   Substance Use Topics    Alcohol use: Yes     Comment: socially    Drug use: No        Review of Systems:  Review of Systems   Constitutional: Negative for chills and fever.   HENT: Negative for  nosebleeds.    Eyes: Negative for blurred vision.   Respiratory: Negative for hemoptysis.    Cardiovascular: Negative for chest pain and palpitations.   Gastrointestinal: Negative for heartburn and vomiting.   Genitourinary: Negative for hematuria.   Musculoskeletal: Positive for back pain. Negative for myalgias.   Skin: Negative for rash.   Neurological: Negative for seizures and loss of consciousness.   Endo/Heme/Allergies: Does not bruise/bleed easily.   Psychiatric/Behavioral: Negative for hallucinations.       OBJECTIVE:     Vital Signs (Most Recent):  Temp: 98.3 °F (36.8 °C) (11/08/18 0907)  Pulse: 75 (11/08/18 0907)  Resp: 18 (11/08/18 0907)  BP: (!) 135/52 (11/08/18 0907)  SpO2: 100 % (11/08/18 0907)    Physical Exam:  General appearance - alert, well appearing, and in no distress  Mental status - AOx3  Eyes - pupils equal and reactive, extraocular eye movements intact  Heart - normal rate, regular rhythm, normal S1, S2, no murmurs, rubs, clicks or gallops  Chest - clear to auscultation, no wheezes, rales or rhonchi, symmetric air entry  Abdomen - soft, nontender, nondistended, no masses or organomegaly  Neurological - alert, oriented, normal speech, no focal findings or movement disorder noted  Extremities - peripheral pulses normal, no pedal edema, no clubbing or cyanosis        ASSESSMENT/PLAN:     Active Hospital Problems    Diagnosis  POA    Chronic pain [G89.29]  Yes      Resolved Hospital Problems   No resolved problems to display.        The risks and benefits of this intervention, and alternative therapies were discussed with the patient.  The discussion of risks included infection, bleeding, need for additional procedures or surgery, nerve damage, paralysis, adverse medication reaction(s), stroke, and/or death.  Questions regarding the procedure, risks, expected outcome, and possible side effects were solicited and answered to the patient's satisfaction.  Sussy wishes to proceed with the  injection.  Verbal and written consent were verified.      Proceed with intervention as scheduled.      Nicci Osorio Jr, MD  Interventional Pain Medicine / Anesthesiology

## 2018-11-08 NOTE — DISCHARGE SUMMARY
[facility]  Discharge Note  Short Stay     Admit Date: 11/8/2018    Discharge Date: 11/8/2018     Attending Physician: Nicci Osorio Jr, MD    Diagnoses:  Active Hospital Problems    Diagnosis  POA    *Lumbar radiculopathy [M54.16]  Yes    Chronic pain [G89.29]  Yes      Resolved Hospital Problems   No resolved problems to display.     Discharged Condition: Good     Hospital Course: Patient was admitted for an outpatient interventional pain management procedure and tolerated the procedure well with no complications.     Final Diagnoses: Same as principal problem.     Disposition: Home or Self Care     Follow up/Patient Instructions:    Follow-up Information     Go to Nicci Osorio Jr, MD.    Specialty:  Pain Medicine  Why:  Post-procedural Follow Up As Scheduled, Call to make an appointment if you do not have one  Contact information:  [address]  [address]  [place] [address]  [phone]                   Reconciled Medications:     Medication List      CONTINUE taking these medications    atorvastatin 20 MG tablet  Commonly known as:  LIPITOR  TAKE ONE TABLET BY MOUTH IN THE EVENING FOR CHOLESTEROL     blood sugar diagnostic Strp  1 strip by Misc.(Non-Drug; Combo Route) route once daily.     blood-glucose meter kit  Use as instructed     fluocinonide 0.05 % gel  Commonly known as:  LIDEX  Apply 1 application topically 2 (two) times daily.     fluticasone-salmeterol 500-50 mcg/dose 500-50 mcg/dose Dsdv diskus inhaler  Commonly known as:  ADVAIR  Inhale 1 puff into the lungs 2 (two) times daily.     furosemide 20 MG tablet  Commonly known as:  LASIX  TAKE ONE TABLET BY MOUTH EVERY OTHER DAY     glimepiride 2 MG tablet  Commonly known as:  AMARYL  TAKE ONE TABLET BY MOUTH IN THE MORNING WITH BREAKFAST     lancets Misc  Commonly known as:  MICROLET LANCET  Check blood sugar 2 times daily     levalbuterol 0.63 mg/3 mL nebulizer solution  Commonly known as:  XOPENEX  Take 3 mLs (0.63 mg  total) by nebulization 3 (three) times daily as needed for Wheezing.     lisinopril 40 MG tablet  Commonly known as:  PRINIVIL,ZESTRIL  Take 1 tablet (40 mg total) by mouth once daily.     magnesium 30 mg Tab  Take 1 tablet by mouth once daily.     meclizine 12.5 mg tablet  Commonly known as:  ANTIVERT  Take 1 tablet (12.5 mg total) by mouth 2 (two) times daily as needed.     metFORMIN 500 MG 24 hr tablet  Commonly known as:  GLUCOPHAGE-XR  take one tablet by mouth daily with breakfast     montelukast 10 mg tablet  Commonly known as:  SINGULAIR  Take 1 tablet (10 mg total) by mouth nightly.     NEBULIZER MISC     NEILMED SINUS RINSE COMPLETE NASL  by Nasal route 2 (two) times daily. Uses in distilled water.     OMNARIS 50 mcg Spry  Generic drug:  ciclesonide  2 sprays by Each Nare route once daily.     PATANOL 0.1 % ophthalmic solution  Generic drug:  olopatadine  Place 1 drop into both eyes. 1 Drops Ophthalmic Twice a day     risedronate 35 MG tablet  Commonly known as:  ACTONEL  Take 1 tablet (35 mg total) by mouth every 7 days.     TYLENOL EXTRA STRENGTH 500 MG tablet  Generic drug:  acetaminophen  Take 1,000 mg by mouth 2 (two) times daily as needed for Pain.     VENTOLIN HFA 90 mcg/actuation inhaler  Generic drug:  albuterol  INHALE 2 PUFFS INTO THE LUNGS EVERY 6 HOURS AS NEEDED     VITAMIN C 100 MG tablet  Generic drug:  ascorbic acid (vitamin C)  Take 100 mg by mouth once daily.     vitamin D 1000 units Tab  Commonly known as:  VITAMIN D3  Take 185 mg by mouth once daily.     XALATAN 0.005 % ophthalmic solution  Generic drug:  latanoprost  Inject into the eye. 1 Drops Ophthalmic Every evening           Discharge Procedure Orders (must include Diet, Follow-up, Activity)   Call MD for:  temperature >100.4     Call MD for:  severe uncontrolled pain     Call MD for:  redness, tenderness, or signs of infection (pain, swelling, redness, odor or green/yellow discharge around incision site)     Call MD for:   difficulty breathing or increased cough     Call MD for:  severe persistent headache     Call MD for:  worsening rash     Remove dressing in 24 hours       Nicci Osorio Jr, MD  Interventional Pain Medicine / Anesthesiology

## 2018-11-08 NOTE — PROGRESS NOTES
Discharge instructions reviewed with patient. Verbalized understanding, no questions at this time. Procedure sites are DSI. VSS. No acute distress noted. Staff at bedside to help pt change. Wheeled to DC area by staff. Home with family in private vehicle.

## 2018-11-08 NOTE — PROGRESS NOTES
Subjective:       Patient ID: Sussy Costa is a 83 y.o. female.    Chief Complaint: Fall    HPI  She fell on October 21st and landed on her chest.  Complains of some residual chest tenderness  Review of Systems   Constitutional: Negative for chills, fatigue, fever and unexpected weight change.   Respiratory: Negative for chest tightness and shortness of breath.    Cardiovascular: Negative for chest pain and palpitations.   Gastrointestinal: Negative for abdominal pain and blood in stool.   Neurological: Negative for dizziness, syncope, numbness and headaches.       Objective:      Physical Exam   HENT:   Right Ear: External ear normal.   Left Ear: External ear normal.   Nose: Nose normal.   Mouth/Throat: Oropharynx is clear and moist.   Eyes: Pupils are equal, round, and reactive to light.   Neck: Normal range of motion.   Cardiovascular: Normal rate and regular rhythm.   No murmur heard.  Pulmonary/Chest: Breath sounds normal.   Abdominal: She exhibits no distension. There is no hepatosplenomegaly. There is no tenderness.   Lymphadenopathy:     She has no cervical adenopathy.     She has no axillary adenopathy.   Neurological: She has normal strength and normal reflexes. No cranial nerve deficit or sensory deficit.       Assessment/Plan       Assessment and plan:  Status post fall.  Check chest x-ray.  She was here for urgent care only appointment.  She will follow up with her primary care physician for a physical

## 2018-11-08 NOTE — OP NOTE
"Procedure Note    Pre-operative Diagnosis: Lumbar Radiculopathy  Post-operative Diagnosis: Lumbar Radiculopathy  Procedure Date: 11/08/2018  Procedure:  (1) Lumbar Epidural Steroid Injection    (2) Intraoperative fluoroscopy          Anesthesia: Local    Indications: To alleviate pain and suffering, and reduce functional impairment.    Procedure in Detail:   The patients history and physical exam were reviewed. The risks, benefits and alternatives to the procedure were discussed, and all questions were answered to the patients satisfaction. The patient agreed to proceed, and written informed consent was verified.    The patient was brought into the procedure room and placed in the prone position on the fluoroscopy table. The area of the lumbar spine was prepped with Chloraprep and draped in a sterile manner. The L5-S1 interspace was identified and marked under AP fluoroscopy. The skin and subcutaneous tissues overlying the targeted interspace were anesthetized with 3-5 mL of 1% lidocaine using a 25G, 1.5" needle. A 17G, 3.5" Tuohy epidural needle was directed toward the interspace under fluoroscopic guidance until the ligamentum flavum was engaged. From this point, a loss of resistance technique with a glass syringe and saline was used to identify entrance of the needle into the epidural space. Once loss of resistance was observed 1 mL of contrast solution was injected. An appropriate epidurogram was noted.    A 5 mL mixture consisting of saline and Depomedrol 80 mg was injected slowly and without resistance.  The needle was removed and a bandage applied to puncture site.    Blood Loss: nil    Disposition: The patient tolerated the procedure well, and there were no apparent complications. Vital signs remained stable throughout the procedure. The patient was taken to the recovery area where written discharge instructions for the procedure were given.     Follow-up: RTC as scheduled      Nicci Osorio Jr, " MD  Interventional Pain Medicine / Anesthesiology

## 2018-11-13 ENCOUNTER — OFFICE VISIT (OUTPATIENT)
Dept: OTOLARYNGOLOGY | Facility: CLINIC | Age: 83
End: 2018-11-13
Payer: MEDICARE

## 2018-11-13 VITALS
DIASTOLIC BLOOD PRESSURE: 85 MMHG | TEMPERATURE: 97 F | HEART RATE: 74 BPM | SYSTOLIC BLOOD PRESSURE: 177 MMHG | WEIGHT: 138 LBS | BODY MASS INDEX: 22.97 KG/M2

## 2018-11-13 DIAGNOSIS — J30.89 NON-SEASONAL ALLERGIC RHINITIS, UNSPECIFIED TRIGGER: ICD-10-CM

## 2018-11-13 DIAGNOSIS — J33.8 POLYP OF PARANASAL SINUS: ICD-10-CM

## 2018-11-13 DIAGNOSIS — J32.8 OTHER CHRONIC SINUSITIS: Primary | ICD-10-CM

## 2018-11-13 PROCEDURE — 31231 NASAL ENDOSCOPY DX: CPT | Mod: PBBFAC,PN | Performed by: OTOLARYNGOLOGY

## 2018-11-13 PROCEDURE — 99214 OFFICE O/P EST MOD 30 MIN: CPT | Mod: 25,S$PBB,, | Performed by: OTOLARYNGOLOGY

## 2018-11-13 PROCEDURE — 99214 OFFICE O/P EST MOD 30 MIN: CPT | Mod: PBBFAC,PN,25 | Performed by: OTOLARYNGOLOGY

## 2018-11-13 PROCEDURE — 99999 PR PBB SHADOW E&M-EST. PATIENT-LVL IV: CPT | Mod: PBBFAC,,, | Performed by: OTOLARYNGOLOGY

## 2018-11-13 PROCEDURE — 31231 NASAL ENDOSCOPY DX: CPT | Mod: S$PBB,,, | Performed by: OTOLARYNGOLOGY

## 2018-11-13 NOTE — PROGRESS NOTES
Chief Complaint   Patient presents with    Sinusitis     congestion in the evenings, possible sore in nasal    Other     scratchy throat   .    HPI:     Sussy Costa is a 83 y.o. female who is known to me from my previous practice with chronic sinusitis who presents today with right otalgia for 2 weeks. She reports that the pain in mild in nature and describes it as a sharp intermittent pain.  She states it is gradually worsening.  She notes right aural fullness with muffled hearing in the right ear only.  She reports that she is not having any nasal congestion, facial pain or pressure, rhinorrhea, or post-nasal drip at present.  She using nasal rinses.  She continues on Singulair, Omnaris.       Interval HPI 11/12/2018:   F/u CRS with history of nasal polyposis. She has completed Levaquin course and reports that she did not resolve compounded nasal saline rinses. She continues on Singulair and Omnaris. She reports drainage from the sinuses bilaterally.      Past Medical History:   Diagnosis Date    Asthma     Cyst of pancreas     liver    Diabetes mellitus type II     Eczema of hand     Glaucoma     Hyperlipidemia     Hypertension     Lichen planus     gums    Osteoporosis     Pulmonary nodules      Social History     Socioeconomic History    Marital status:      Spouse name: sania    Number of children: 1    Years of education: Not on file    Highest education level: Not on file   Social Needs    Financial resource strain: Not on file    Food insecurity - worry: Not on file    Food insecurity - inability: Not on file    Transportation needs - medical: Not on file    Transportation needs - non-medical: Not on file   Occupational History    Occupation:    Tobacco Use    Smoking status: Never Smoker    Smokeless tobacco: Never Used   Substance and Sexual Activity    Alcohol use: Yes     Comment: socially    Drug use: No    Sexual activity: Yes     Partners: Male      Birth control/protection: Post-menopausal   Other Topics Concern    Are you pregnant or think you may be? Not Asked    Breast-feeding Not Asked   Social History Narrative    She does not exercise regularly.     Past Surgical History:   Procedure Laterality Date    CATARACT EXTRACTION, BILATERAL      CHOLECYSTECTOMY      EPIDURAL STEROID INJECTION INTO LUMBAR SPINE N/A 11/8/2018    Procedure: Injection-steroid-epidural-lumbar L5-S1;  Surgeon: Nicci Osorio Jr., MD;  Location: [facility];  Service: Pain Management;  Laterality: N/A;    HYSTERECTOMY      INJECTION-JOINT Right 7/10/2014    Performed by [facility] at [facility]    Injection-steroid-epidural-lumbar L5-S1 N/A 11/8/2018    Performed by [facility]ci [facility] Jr., [facility] at [facility]    nasal polyps      ULTRASOUND-ENDOSCOPIC-UPPER N/A 3/18/2016    Performed by Max Rosado MD at [facility] (2ND FLR)     Family History   Problem Relation Age of Onset    Heart disease Father     Heart disease Brother     Hypertension Brother            Review of Systems  General: negative for chills, fever or weight loss  Psychological: negative for mood changes or depression  Ophthalmic: negative for blurry vision, photophobia or eye pain  ENT: see HPI  Respiratory: no cough, shortness of breath, or wheezing  Cardiovascular: no chest pain or dyspnea on exertion  Gastrointestinal: no abdominal pain, change in bowel habits, or black/ bloody stools  Musculoskeletal: negative for gait disturbance or muscular weakness  Neurological: no syncope or seizures; no ataxia  Dermatological: negative for puritis,  rash and jaundice  Hematologic/lymphatic: no easy bruising, no new lumps or bumps      Physical Exam:    Vitals:    11/13/18 1044   BP: (!) 177/85   Pulse: 74   Temp: 97.2 °F (36.2 °C)       Constitutional: Well appearing / communicating without difficutly.  NAD.  Eyes: EOM I Bilaterally  Head/Face: Normocephalic.  Negative paranasal sinus  pressure/tenderness.  Salivary glands WNL.  House Brackmann I Bilaterally.    Right Ear: Auricle normal appearance. External Auditory Canal within normal limits,TM:  effusion resolved. TM mobility limited.    Left Ear: Auricle normal appearance. External Auditory Canal WNL,TM retracted. TM mobility limited.  Nose: No gross nasal septal deviation. Inferior Turbinates edematous 3+ bilaterally. No septal perforation. No masses/lesions. External nasal skin appears normal without masses/lesions. Thick purulent crusts resolved.   Oral Cavity: Gingiva/lips within normal limits.  Dentition/gingiva healthy appearing. Mucus membranes moist. Floor of mouth soft, no masses palpated. Oral Tongue mobile. Hard Palate appears normal.    Oropharynx: Base of tongue appears normal. No masses/lesions noted. Tonsillar fossa/pharyngeal wall without lesions. Posterior oropharynx WNL.  Soft palate without masses. Midline uvula.   Neck/Lymphatic: No LAD I-VI bilaterally.  No thyromegaly.  No masses noted on exam.    Mirror laryngoscopy/nasopharyngoscopy: Active gag reflex.  Unable to perform.    Neuro/Psychiatric: AOx3.  Normal mood and affect.   Cardiovascular: Normal carotid pulses bilaterally, no increasing jugular venous distention noted at cervical region bilaterally.    Respiratory: Normal respiratory effort, no stridor, no retractions noted.    See separate procedure note for nasal endoscopy.      Assessment:    ICD-10-CM ICD-9-CM    1. Other chronic sinusitis J32.8 473.8    2. Polyp of paranasal sinus J33.8 471.8    3. Non-seasonal allergic rhinitis, unspecified trigger J30.89 477.8      The primary encounter diagnosis was Other chronic sinusitis. Diagnoses of Polyp of paranasal sinus and Non-seasonal allergic rhinitis, unspecified trigger were also pertinent to this visit.      Plan:    CRS with polyposis: I re-prescribe the daily use of compounded isotonic saline irrigation to treat her recalcitrant sinonasal inflammation.  She  was counseled on the off-label nature of this therapy and she consented to its use. Continue Singulair. Discontinue Omnaris. If no improvement on follow up CT scan of the sinuses may be warranted for further evaluation of polyposis.     Follow up in 8 weeks.     .Rosangela Isabel MD

## 2018-11-21 ENCOUNTER — OFFICE VISIT (OUTPATIENT)
Dept: PAIN MEDICINE | Facility: CLINIC | Age: 83
End: 2018-11-21
Payer: MEDICARE

## 2018-11-21 VITALS
WEIGHT: 140.44 LBS | DIASTOLIC BLOOD PRESSURE: 69 MMHG | BODY MASS INDEX: 23.37 KG/M2 | HEART RATE: 78 BPM | SYSTOLIC BLOOD PRESSURE: 188 MMHG

## 2018-11-21 DIAGNOSIS — M48.062 SPINAL STENOSIS OF LUMBAR REGION WITH NEUROGENIC CLAUDICATION: ICD-10-CM

## 2018-11-21 DIAGNOSIS — G89.4 CHRONIC PAIN SYNDROME: ICD-10-CM

## 2018-11-21 DIAGNOSIS — M54.16 LUMBAR RADICULOPATHY: Primary | ICD-10-CM

## 2018-11-21 DIAGNOSIS — M43.10 ANTEROLISTHESIS: ICD-10-CM

## 2018-11-21 DIAGNOSIS — M47.816 LUMBAR SPONDYLOSIS: ICD-10-CM

## 2018-11-21 PROCEDURE — 99213 OFFICE O/P EST LOW 20 MIN: CPT | Mod: S$PBB,,, | Performed by: NURSE PRACTITIONER

## 2018-11-21 PROCEDURE — 99214 OFFICE O/P EST MOD 30 MIN: CPT | Mod: PBBFAC,PO | Performed by: NURSE PRACTITIONER

## 2018-11-21 PROCEDURE — 99999 PR PBB SHADOW E&M-EST. PATIENT-LVL IV: CPT | Mod: PBBFAC,,, | Performed by: NURSE PRACTITIONER

## 2018-11-21 NOTE — PROGRESS NOTES
[facility] New Patient Evaluation    Referred by: PAULA Casillas MD   Reason for referral: Spinal stenosis of lumbar region, unspecified whether neurogenic claudication present       CC:   Chief Complaint   Patient presents with    Back Pain       Last 3 PDI Scores 11/21/2018 10/5/2018   Pain Disability Index (PDI) 12 31       Interval Update: 11/21/18 Pt is S/P Lumbar Epidural Steroid Injection on 11/8/18 with 75% continued  Relief,  external PT referral given today.     Background: Sussy Costa is a 83 y.o. female referred for back pain with radiculopathy.    Location: Low Back  Severity: Currently: 8/10   Typical Range: 8/10     Exacerbation: 10/10   Onset: Years and has gotten worse   Quality: Aching  Radiation: right postero-lateral thigh and leg to lateral foot  Axial/Extremity Percentage of Pain: 30/70  Exacerbating Factors: house cleaning  Mitigating Factors: heat and rest  Assoc: denies night fever/night sweats, urinary incontinence, bowel incontinence, significant weight loss, significant motor weakness, loss of sensations    Previous Therapies:  PT:   HEP:   TENS:  Injections: 11/8/18 Lumbar Epidural Steroid Injection  Surgery:  Medications:   - NSAIDS:   - MSK Relaxants:   - TCAs:   - SNRIs:   - Topicals:   - Anticonvulsants:  - Opioids:     Current Pain Medications:    1. Tylenol Arthritis      Full Medication List:    Current Outpatient Medications:     acetaminophen (TYLENOL EXTRA STRENGTH) 500 MG tablet, Take 1,000 mg by mouth 2 (two) times daily as needed for Pain. , Disp: , Rfl:     ascorbic acid (VITAMIN C) 100 MG tablet, Take 100 mg by mouth once daily., Disp: , Rfl:     atorvastatin (LIPITOR) 20 MG tablet, TAKE ONE TABLET BY MOUTH IN THE EVENING FOR CHOLESTEROL, Disp: 90 tablet, Rfl: 3    blood sugar diagnostic Strp, 1 strip by Misc.(Non-Drug; Combo Route) route once daily., Disp: 100 strip, Rfl: 11    fluocinonide (LIDEX) 0.05 % gel, Apply 1 application topically 2 (two)  times daily.  , Disp: , Rfl:     furosemide (LASIX) 20 MG tablet, TAKE ONE TABLET BY MOUTH EVERY OTHER DAY , Disp: 15 tablet, Rfl: 1    glimepiride (AMARYL) 2 MG tablet, TAKE ONE TABLET BY MOUTH IN THE MORNING WITH BREAKFAST, Disp: 90 tablet, Rfl: 3    lancets (MICROLET LANCET) Misc, Check blood sugar 2 times daily, Disp: 100 each, Rfl: 3    latanoprost (XALATAN) 0.005 % ophthalmic solution, Inject into the eye. 1 Drops Ophthalmic Every evening, Disp: , Rfl:     levalbuterol (XOPENEX) 0.63 mg/3 mL nebulizer solution, Take 3 mLs (0.63 mg total) by nebulization 3 (three) times daily as needed for Wheezing., Disp: 36 mL, Rfl: 6    lisinopril (PRINIVIL,ZESTRIL) 40 MG tablet, Take 1 tablet (40 mg total) by mouth once daily., Disp: 90 tablet, Rfl: 3    magnesium 30 mg Tab, Take 1 tablet by mouth once daily., Disp: , Rfl:     meclizine (ANTIVERT) 12.5 mg tablet, Take 1 tablet (12.5 mg total) by mouth 2 (two) times daily as needed., Disp: 30 tablet, Rfl: 0    metFORMIN (GLUCOPHAGE-XR) 500 MG 24 hr tablet, take one tablet by mouth daily with breakfast, Disp: 90 tablet, Rfl: 4    montelukast (SINGULAIR) 10 mg tablet, Take 1 tablet (10 mg total) by mouth nightly., Disp: 90 tablet, Rfl: 3    NEBULIZER ACCESSORIES (NEBULIZER MISC), , Disp: , Rfl:     olopatadine (PATANOL) 0.1 % ophthalmic solution, Place 1 drop into both eyes. 1 Drops Ophthalmic Twice a day , Disp: , Rfl:     OMNARIS 50 mcg Spry, 2 sprays by Each Nare route once daily., Disp: 12.5 g, Rfl: 6    risedronate (ACTONEL) 35 MG tablet, Take 1 tablet (35 mg total) by mouth every 7 days., Disp: 12 tablet, Rfl: 3    SODIUM CHLORIDE/SODIUM BICARB (NEILMED SINUS RINSE COMPLETE NASL), by Nasal route 2 (two) times daily. Uses in distilled water., Disp: , Rfl:     VENTOLIN HFA 90 mcg/actuation inhaler, INHALE 2 PUFFS INTO THE LUNGS EVERY 6 HOURS AS NEEDED, Disp: 18 g, Rfl: 5    vitamin D 185 MG Tab, Take 185 mg by mouth once daily.  , Disp: , Rfl:      blood-glucose meter kit, Use as instructed, Disp: 1 each, Rfl: 0    fluticasone-salmeterol 500-50 mcg/dose (ADVAIR) 500-50 mcg/dose DsDv diskus inhaler, Inhale 1 puff into the lungs 2 (two) times daily., Disp: 180 each, Rfl: 3     Review of Systems:  Review of Systems   Constitutional: Negative for chills and fever.   HENT: Negative for nosebleeds.    Eyes: Negative for pain.   Respiratory: Negative for hemoptysis.    Cardiovascular: Negative for chest pain.   Gastrointestinal: Negative for nausea and vomiting.   Genitourinary: Negative for dysuria.   Musculoskeletal: Positive for back pain. Negative for falls.   Skin: Negative for rash.   Neurological: Positive for focal weakness (rare weakness in RLE with knee extension).   Endo/Heme/Allergies: Does not bruise/bleed easily.   Psychiatric/Behavioral: Negative for depression. The patient is not nervous/anxious.        Allergies:  Aspirin; Aspirin; Nsaids (non-steroidal anti-inflammatory drug); and Penicillin g     Medical History:  Past Medical History:   Diagnosis Date    Asthma     Cyst of pancreas     liver    Diabetes mellitus type II     Eczema of hand     Glaucoma     Hyperlipidemia     Hypertension     Lichen planus     gums    Osteoporosis     Pulmonary nodules         Surgical History:  Past Surgical History:   Procedure Laterality Date    CATARACT EXTRACTION, BILATERAL      CHOLECYSTECTOMY      EPIDURAL STEROID INJECTION INTO LUMBAR SPINE N/A 11/8/2018    Procedure: Injection-steroid-epidural-lumbar L5-S1;  Surgeon: Nicci Osorio Jr., MD;  Location: [facility];  Service: Pain Management;  Laterality: N/A;    HYSTERECTOMY      INJECTION-JOINT Right 7/10/2014    Performed by [facility]    Injection-steroid-epidural-lumbar L5-S1 N/A 11/8/2018    Performed by [facility] Jr., [facility] at [facility]    nasal polyps      ULTRASOUND-ENDOSCOPIC-UPPER N/A 3/18/2016    Performed by Max [facility]ado [facility] at [facility]  (2ND FLR)        Social History:  Social History     Socioeconomic History    Marital status:      Spouse name: sania    Number of children: 1    Years of education: Not on file    Highest education level: Not on file   Social Needs    Financial resource strain: Not on file    Food insecurity - worry: Not on file    Food insecurity - inability: Not on file    Transportation needs - medical: Not on file    Transportation needs - non-medical: Not on file   Occupational History    Occupation:    Tobacco Use    Smoking status: Never Smoker    Smokeless tobacco: Never Used   Substance and Sexual Activity    Alcohol use: Yes     Comment: socially    Drug use: No    Sexual activity: Yes     Partners: Male     Birth control/protection: Post-menopausal   Other Topics Concern    Are you pregnant or think you may be? Not Asked    Breast-feeding Not Asked   Social History Narrative    She does not exercise regularly.       Physical Exam:  Vitals:    11/21/18 0930   BP: (!) 188/69   Pulse: 78   Weight: 63.7 kg (140 lb 6.9 oz)   PainSc:   2     General    Nursing note and vitals reviewed.  Constitutional: She is oriented to person, place, and time. She appears well-developed and well-nourished. No distress.   HENT:   Head: Normocephalic and atraumatic.   Nose: Nose normal.   Eyes: Conjunctivae and EOM are normal. Pupils are equal, round, and reactive to light. Right eye exhibits no discharge. Left eye exhibits no discharge. No scleral icterus.   Neck: No JVD present.   Cardiovascular: Intact distal pulses.    Pulmonary/Chest: Effort normal. No respiratory distress.   Abdominal: She exhibits no distension.   Neurological: She is alert and oriented to person, place, and time. Coordination normal.   Psychiatric: She has a normal mood and affect. Her behavior is normal. Judgment and thought content normal.     General Musculoskeletal Exam   Gait: normal     Back (L-Spine & T-Spine) / Neck (C-Spine)  Exam     Tenderness Right paramedian tenderness of the Lower L-Spine. Left paramedian tenderness of the Lower L-Spine.     Back (L-Spine & T-Spine) Range of Motion   Back extension: facet loading is positive and exacerabtes/reproduces the patient's typical low back pain    Back flexion: limited ROM but partial relief of low back pain noted.     Spinal Sensation   Right Side Sensation  L-Spine Level: normal  Left Side Sensation  L-Spine Level: normal    Other She has no scoliosis .    Comments:  + SLR on right      Muscle Strength   Right Lower Extremity   Hip Flexion: 5/5   Hip Extensors: 5/5  Quadriceps:  5/5   Hamstrin/5   Gastrocsoleus:  5/5/5  Left Lower Extremity   Hip Flexion: 5/5   Hip Extensors: 5/5  Quadriceps:  5/5   Hamstrin/5   Gastrocsoleus:  5/5/5    Reflexes     Left Side  Quadriceps:  2+  Achilles:  2+    Right Side   Quadriceps:  2+  Achilles:  2+      Imaging:  MRI Lumbar Spine Without Contrast   Reading Physician Reading Date Result Priority   Titus Sheth MD 2018    Barrett Akbar MD 2018       Narrative     EXAMINATION:  MRI LUMBAR SPINE WITHOUT CONTRAST    CLINICAL HISTORY:  Low back pain, risk factors (osteoporosis or chronic steroid use or elderly);Spinal stenosis, lumbar; Low back pain    TECHNIQUE:  Multiplanar, multisequence MR images were acquired from the thoracolumbar junction to the sacrum without the administration of contrast.    COMPARISON:  MRI lumbar spine 2014.    FINDINGS:  Alignment: Grade 1 anterolisthesis of L4 on L5.  Alignment is otherwise appropriate.    Vertebrae: Normal marrow signal. No fracture.    Discs: Mild disc space height narrowing at L4-5.    Cord: Normal.  Conus terminates at L1.    Degenerative findings:    T12-L1: No significant central canal stenosis or neural foraminal narrowing.    L1-L2: Broad-based disc bulge with no significant central canal stenosis or neural foraminal narrowing.    L2-L3: No significant central canal  stenosis or neural foraminal narrowing.    L3-L4: Left-sided asymmetric disc bulge resulting in mild spinal canal stenosis and mild left neural foraminal narrowing.    L4-L5: Broad-based disc bulge and left foraminal zone disc extrusion along with moderate bilateral facet arthropathy result in severe spinal canal stenosis and mild right and severe left neural foraminal narrowing.    L5-S1: Broad-based disc bulge and facet arthropathy result in mild bilateral neural foraminal narrowing.    Paraspinal muscles & soft tissues: Unremarkable.         Labs:  BMP  Lab Results   Component Value Date     08/23/2018    K 3.8 08/23/2018     08/23/2018    CO2 29 08/23/2018    BUN 15 08/23/2018    CREATININE 0.9 08/23/2018    CALCIUM 10.3 08/23/2018    ANIONGAP 8 08/23/2018    ESTGFRAFRICA >60.0 08/23/2018    EGFRNONAA >60.0 08/23/2018     Lab Results   Component Value Date    ALT 11 02/21/2018    AST 17 02/21/2018    ALKPHOS 57 02/21/2018    BILITOT 0.6 02/21/2018       Assessment:  Problem List Items Addressed This Visit     None          This is a pleasant 82-year-old female who presents with her  for evaluation of chronic low back pain with right-sided radiculopathy.  MRI shows severe stenosis at L4-5 due to a combination of degenerative disc disease and facet arthropathy.  There is also contribution from anterolisthesis of L4 on L5 with partial unroofing of the posterior aspect of the L4-5 intervertebral disc.  She describes symptoms consistent with neurogenic claudication and lumbar radiculopathy and I believe she would benefit from a combination of physical therapy and a lumbar epidural steroid injection at L5-S1. She is on chronic steroid therapy for treatment of her asthma and I have recommended limiting epidural steroid injections to no more than 3 times per year, but ideally no more than twice per year.  Her decision to repeat the injection the future will depend on her response to the  1st.    11/21/2018-82 y/o pleasant female with chronic lbp with right only radic   presents with her  for f/u visit she is s/p Lumbar ARABELLA @ L5-S1 with 75% continued relief, discussed that she will start PT external order place and give via print out today.     Treatment Plan:   PT/OT/HEP: Ext ref to PT placed today for spine program and back rehabilitation.  Patient educated on importance of PT and HEP.  Procedures: none at this time  Medications: No changes recommended at this time.  We may consider additional injections.  Imaging: No additional imaging recommended at this time.  Labs: Reviewed.  Medications are appropriately dosed for current hepatorenal function.    Follow Up: RTC as needed for returning or new pain    Jovani Vaughan NP-C  [facility]      Disclaimer: This note was partly generated using dictation software which may occasionally result in transcription errors.

## 2018-11-23 ENCOUNTER — TELEPHONE (OUTPATIENT)
Dept: OTOLARYNGOLOGY | Facility: CLINIC | Age: 83
End: 2018-11-23

## 2018-11-23 NOTE — TELEPHONE ENCOUNTER
Spoke with patient she was calling to confirm appointment with Dr Colin, told patient appointment is on 12/11 at 9:40. Patient verbalized understanding

## 2018-11-23 NOTE — TELEPHONE ENCOUNTER
----- Message from Jade Cary sent at 11/23/2018  2:50 PM CST -----  Contact: [phone]/self  Patient is requesting a call back regarding her appointment. Thanks

## 2018-12-11 ENCOUNTER — OFFICE VISIT (OUTPATIENT)
Dept: OTOLARYNGOLOGY | Facility: CLINIC | Age: 83
End: 2018-12-11
Payer: MEDICARE

## 2018-12-11 VITALS
WEIGHT: 133.06 LBS | BODY MASS INDEX: 22.17 KG/M2 | HEART RATE: 74 BPM | DIASTOLIC BLOOD PRESSURE: 77 MMHG | HEIGHT: 65 IN | SYSTOLIC BLOOD PRESSURE: 146 MMHG

## 2018-12-11 DIAGNOSIS — J30.89 NON-SEASONAL ALLERGIC RHINITIS, UNSPECIFIED TRIGGER: ICD-10-CM

## 2018-12-11 DIAGNOSIS — J33.8 POLYP OF PARANASAL SINUS: ICD-10-CM

## 2018-12-11 DIAGNOSIS — H69.93 ETD (EUSTACHIAN TUBE DYSFUNCTION), BILATERAL: ICD-10-CM

## 2018-12-11 DIAGNOSIS — J32.8 OTHER CHRONIC SINUSITIS: Primary | ICD-10-CM

## 2018-12-11 PROCEDURE — 99999 PR PBB SHADOW E&M-EST. PATIENT-LVL IV: CPT | Mod: PBBFAC,,, | Performed by: OTOLARYNGOLOGY

## 2018-12-11 PROCEDURE — 99214 OFFICE O/P EST MOD 30 MIN: CPT | Mod: 25,S$PBB,, | Performed by: OTOLARYNGOLOGY

## 2018-12-11 PROCEDURE — 31231 NASAL ENDOSCOPY DX: CPT | Mod: S$PBB,,, | Performed by: OTOLARYNGOLOGY

## 2018-12-11 PROCEDURE — 99214 OFFICE O/P EST MOD 30 MIN: CPT | Mod: PBBFAC,PN | Performed by: OTOLARYNGOLOGY

## 2018-12-11 PROCEDURE — 31231 NASAL ENDOSCOPY DX: CPT | Mod: PBBFAC,PN | Performed by: OTOLARYNGOLOGY

## 2018-12-11 RX ORDER — MOXIFLOXACIN HYDROCHLORIDE 400 MG/1
400 TABLET ORAL DAILY
Qty: 10 TABLET | Refills: 0 | Status: SHIPPED | OUTPATIENT
Start: 2018-12-11 | End: 2018-12-21

## 2018-12-11 RX ORDER — PREDNISONE 20 MG/1
TABLET ORAL
Qty: 21 TABLET | Refills: 0 | Status: SHIPPED | OUTPATIENT
Start: 2018-12-11 | End: 2019-01-09 | Stop reason: ALTCHOICE

## 2018-12-11 NOTE — PROGRESS NOTES
Chief Complaint   Patient presents with    Follow-up    Sinusitis     patient states she is doing better   .    HPI:     Sussy Costa is a 83 y.o. female who is known to me from my previous practice with chronic sinusitis who presents today with right otalgia for 2 weeks. She reports that the pain in mild in nature and describes it as a sharp intermittent pain.  She states it is gradually worsening.  She notes right aural fullness with muffled hearing in the right ear only.  She reports that she is not having any nasal congestion, facial pain or pressure, rhinorrhea, or post-nasal drip at present.  She using nasal rinses.  She continues on Singulair, Omnaris.       Interval HPI 12/11/2018:   F/u CRS with history of nasal polyposis. She notes that she has been having increased crusting and nasal congestion. She reports drainage from the sinuses bilaterally.   She feels she is doing better from an asthma standpoint. She reports that she did not receive compounded nasal saline rinses. She continues on Singulair and Omnaris. She feels this is helpful.     Past Medical History:   Diagnosis Date    Asthma     Cyst of pancreas     liver    Diabetes mellitus type II     Eczema of hand     Glaucoma     Hyperlipidemia     Hypertension     Lichen planus     gums    Osteoporosis     Pulmonary nodules      Social History     Socioeconomic History    Marital status:      Spouse name: sania    Number of children: 1    Years of education: Not on file    Highest education level: Not on file   Social Needs    Financial resource strain: Not on file    Food insecurity - worry: Not on file    Food insecurity - inability: Not on file    Transportation needs - medical: Not on file    Transportation needs - non-medical: Not on file   Occupational History    Occupation:    Tobacco Use    Smoking status: Never Smoker    Smokeless tobacco: Never Used   Substance and Sexual Activity    Alcohol  use: Yes     Comment: socially    Drug use: No    Sexual activity: Yes     Partners: Male     Birth control/protection: Post-menopausal   Other Topics Concern    Are you pregnant or think you may be? Not Asked    Breast-feeding Not Asked   Social History Narrative    She does not exercise regularly.     Past Surgical History:   Procedure Laterality Date    CATARACT EXTRACTION, BILATERAL      CHOLECYSTECTOMY      EPIDURAL STEROID INJECTION INTO LUMBAR SPINE N/A 11/8/2018    Procedure: Injection-steroid-epidural-lumbar L5-S1;  Surgeon: Nicci Osorio Jr., MD;  Location: [facility];  Service: Pain Management;  Laterality: N/A;    HYSTERECTOMY      INJECTION-JOINT Right 7/10/2014    Performed by [facility] at [facility]    Injection-steroid-epidural-lumbar L5-S1 N/A 11/8/2018    Performed by Nicci [facility]io Jr., MD at [facility]    nasal polyps      ULTRASOUND-ENDOSCOPIC-UPPER N/A 3/18/2016    Performed by Max Rosado MD at [facility] ([address] [place] [address])     Family History   Problem Relation Age of Onset    Heart disease Father     Heart disease Brother     Hypertension Brother            Review of Systems  General: negative for chills, fever or weight loss  Psychological: negative for mood changes or depression  Ophthalmic: negative for blurry vision, photophobia or eye pain  ENT: see HPI  Respiratory: no cough, shortness of breath, or wheezing  Cardiovascular: no chest pain or dyspnea on exertion  Gastrointestinal: no abdominal pain, change in bowel habits, or black/ bloody stools  Musculoskeletal: negative for gait disturbance or muscular weakness  Neurological: no syncope or seizures; no ataxia  Dermatological: negative for puritis,  rash and jaundice  Hematologic/lymphatic: no easy bruising, no new lumps or bumps      Physical Exam:    Vitals:    12/11/18 0934   BP: (!) 146/77   Pulse: 74       Constitutional: Well appearing / communicating without difficutly.  NAD.  Eyes: EOM I  Bilaterally  Head/Face: Normocephalic.  Negative paranasal sinus pressure/tenderness.  Salivary glands WNL.  House Brackmann I Bilaterally.    Right Ear: Auricle normal appearance. External Auditory Canal within normal limits,TM:  effusion resolved. TM mobility limited.    Left Ear: Auricle normal appearance. External Auditory Canal WNL,TM retracted. TM mobility limited.  Nose: No gross nasal septal deviation. Inferior Turbinates edematous 3+ bilaterally. No septal perforation. No masses/lesions. External nasal skin appears normal without masses/lesions. Thick purulent crusts resolved.   Oral Cavity: Gingiva/lips within normal limits.  Dentition/gingiva healthy appearing. Mucus membranes moist. Floor of mouth soft, no masses palpated. Oral Tongue mobile. Hard Palate appears normal.    Oropharynx: Base of tongue appears normal. No masses/lesions noted. Tonsillar fossa/pharyngeal wall without lesions. Posterior oropharynx WNL.  Soft palate without masses. Midline uvula.   Neck/Lymphatic: No LAD I-VI bilaterally.  No thyromegaly.  No masses noted on exam.    Mirror laryngoscopy/nasopharyngoscopy: Active gag reflex.  Unable to perform.    Neuro/Psychiatric: AOx3.  Normal mood and affect.   Cardiovascular: Normal carotid pulses bilaterally, no increasing jugular venous distention noted at cervical region bilaterally.    Respiratory: Normal respiratory effort, no stridor, no retractions noted.    See separate procedure note for nasal endoscopy.      Assessment:    ICD-10-CM ICD-9-CM    1. Other chronic sinusitis J32.8 473.8    2. Polyp of paranasal sinus J33.8 471.8    3. Non-seasonal allergic rhinitis, unspecified trigger J30.89 477.8    4. ETD (Eustachian tube dysfunction), bilateral H69.83 381.81      The primary encounter diagnosis was Other chronic sinusitis. Diagnoses of Polyp of paranasal sinus, Non-seasonal allergic rhinitis, unspecified trigger, and ETD (Eustachian tube dysfunction), bilateral were also pertinent  to this visit.      Plan:  CRS with polyposis: Will start Avelox and Prednisone taper today.   I re-prescribe the daily use of compounded isotonic saline irrigation to treat her recalcitrant sinonasal inflammation.  She was counseled on the off-label nature of this therapy and she consented to its use. Continue Singulair. Discontinue Omnaris. If discussed ordering a CT scan of the sinuses to further evaluate the  polyposis.  The patient is reticent to undergo CT scan and she is unsure if she would pursue surgery at this time. We discussed that we will maximize medical management and have her follow up in 1 month for recheck.     Rosangela Isabel MD

## 2018-12-11 NOTE — PROCEDURES
Procedures       PROCEDURE NOTE:  Nasal endoscopy   Preprocedure diagnosis:  Chronic sinusitis  Postprocedure diangosis:  Same  Complications:  None  Blood Loss:  None    Procedure in detail:  After verbal consent was obtained, the patient's nasal cavity was anesthesized using topical 1%lidocaine and Neosynepherine.  A rigid 0 degree endoscope was placed in first the right, then the left nasal cavity.  The inferior and middle turbinates were examined, and found to be edematous bilaterally.  The middle meatus and maxillary antrum was also examined, and found to have thick crusting and polypoid mucosa/polyps remain present bilaterally.  No masses seen.  The patient tolerated the procedure well and there were no complications.

## 2018-12-13 ENCOUNTER — TELEPHONE (OUTPATIENT)
Dept: OTOLARYNGOLOGY | Facility: CLINIC | Age: 83
End: 2018-12-13

## 2018-12-13 NOTE — TELEPHONE ENCOUNTER
----- Message from Sharee Amanda sent at 12/13/2018 12:02 PM CST -----  No. [phone]     Patient has questions about the medication she received from the pharmacy in [place].  Please call.

## 2018-12-13 NOTE — TELEPHONE ENCOUNTER
Patient called in and would like to know if she should use the nasal rinse, the nasal spray and the prednisone all together. She also wants to know how often should she use the new nasal rinse.

## 2018-12-13 NOTE — TELEPHONE ENCOUNTER
Spoke to patient and notified that she should discontinue using the nasal spray and start using the medicated saline rinse. She should continue using her prednisone.

## 2019-01-09 ENCOUNTER — OFFICE VISIT (OUTPATIENT)
Dept: OTOLARYNGOLOGY | Facility: CLINIC | Age: 84
End: 2019-01-09
Payer: MEDICARE

## 2019-01-09 VITALS
WEIGHT: 139.13 LBS | HEART RATE: 78 BPM | BODY MASS INDEX: 23.15 KG/M2 | TEMPERATURE: 97 F | SYSTOLIC BLOOD PRESSURE: 176 MMHG | DIASTOLIC BLOOD PRESSURE: 83 MMHG

## 2019-01-09 DIAGNOSIS — J30.89 NON-SEASONAL ALLERGIC RHINITIS, UNSPECIFIED TRIGGER: ICD-10-CM

## 2019-01-09 DIAGNOSIS — J32.8 OTHER CHRONIC SINUSITIS: Primary | ICD-10-CM

## 2019-01-09 DIAGNOSIS — J33.8 POLYP OF PARANASAL SINUS: ICD-10-CM

## 2019-01-09 PROCEDURE — 31231 PR NASAL ENDOSCOPY, DX: ICD-10-PCS | Mod: S$PBB,,, | Performed by: OTOLARYNGOLOGY

## 2019-01-09 PROCEDURE — 99213 OFFICE O/P EST LOW 20 MIN: CPT | Mod: 25,S$PBB,, | Performed by: OTOLARYNGOLOGY

## 2019-01-09 PROCEDURE — 99214 OFFICE O/P EST MOD 30 MIN: CPT | Mod: PBBFAC,PN | Performed by: OTOLARYNGOLOGY

## 2019-01-09 PROCEDURE — 99213 PR OFFICE/OUTPT VISIT, EST, LEVL III, 20-29 MIN: ICD-10-PCS | Mod: 25,S$PBB,, | Performed by: OTOLARYNGOLOGY

## 2019-01-09 PROCEDURE — 31231 NASAL ENDOSCOPY DX: CPT | Mod: PBBFAC,PN | Performed by: OTOLARYNGOLOGY

## 2019-01-09 PROCEDURE — 99999 PR PBB SHADOW E&M-EST. PATIENT-LVL IV: ICD-10-PCS | Mod: PBBFAC,,, | Performed by: OTOLARYNGOLOGY

## 2019-01-09 PROCEDURE — 99999 PR PBB SHADOW E&M-EST. PATIENT-LVL IV: CPT | Mod: PBBFAC,,, | Performed by: OTOLARYNGOLOGY

## 2019-01-09 PROCEDURE — 31231 NASAL ENDOSCOPY DX: CPT | Mod: S$PBB,,, | Performed by: OTOLARYNGOLOGY

## 2019-01-09 NOTE — PROCEDURES
Procedures       PROCEDURE NOTE:  Nasal endoscopy   Preprocedure diagnosis:  Chronic sinusitis with polyposis  Postprocedure diangosis:  Same  Complications:  None  Blood Loss:  None    Procedure in detail:  After verbal consent was obtained, the patient's nasal cavity was anesthesized using topical 1%lidocaine and Neosynepherine.  A rigid 0 degree endoscope was placed in first the right, then the left nasal cavity.  The inferior and middle turbinates were examined, and found to be edematous bilaterally.  The middle meatus and maxillary antrum was also examined, and found to have thick crusting and polypoid mucosa/polyps remain present bilaterally but are slightly decreased in size.  No masses seen.  The patient tolerated the procedure well and there were no complications.

## 2019-01-09 NOTE — PROGRESS NOTES
Chief Complaint   Patient presents with    Follow-up   .    HPI:     Sussy Costa is a 83 y.o. female who is known to me from my previous practice with chronic sinusitis who presents today with right otalgia for 2 weeks. She reports that the pain in mild in nature and describes it as a sharp intermittent pain.  She states it is gradually worsening.  She notes right aural fullness with muffled hearing in the right ear only.  She reports that she is not having any nasal congestion, facial pain or pressure, rhinorrhea, or post-nasal drip at present.  She using nasal rinses.  She continues on Singulair, Omnaris.     Interval HPI 1/9/2018:   F/u CRS with nasals polyposis.  She states that she has been using her compounded nasal saline irrigations. She states that this has been helpful feels that their is less rhinorrhea and crusting. She continues on Singulair and Omnaris.  She states that her asthma has been better controlled.     Past Medical History:   Diagnosis Date    Asthma     Cyst of pancreas     liver    Diabetes mellitus type II     Eczema of hand     Glaucoma     Hyperlipidemia     Hypertension     Lichen planus     gums    Osteoporosis     Pulmonary nodules      Social History     Socioeconomic History    Marital status:      Spouse name: sania    Number of children: 1    Years of education: Not on file    Highest education level: Not on file   Social Needs    Financial resource strain: Not on file    Food insecurity - worry: Not on file    Food insecurity - inability: Not on file    Transportation needs - medical: Not on file    Transportation needs - non-medical: Not on file   Occupational History    Occupation:    Tobacco Use    Smoking status: Never Smoker    Smokeless tobacco: Never Used   Substance and Sexual Activity    Alcohol use: Yes     Comment: socially    Drug use: No    Sexual activity: Yes     Partners: Male     Birth control/protection:  Post-menopausal   Other Topics Concern    Are you pregnant or think you may be? Not Asked    Breast-feeding Not Asked   Social History Narrative    She does not exercise regularly.     Past Surgical History:   Procedure Laterality Date    CATARACT EXTRACTION, BILATERAL      CHOLECYSTECTOMY      HYSTERECTOMY      INJECTION-JOINT Right 7/10/2014    Performed by [facility]on [facility] at [facility]    Injection-steroid-epidural-lumbar L5-S1 N/A 11/8/2018    [facility]    nasal polyps      ULTRASOUND-ENDOSCOPIC-UPPER N/A 3/18/2016    Performed by [facility] at [facility] ENDO (2ND F[facility])     Family History   Problem Relation Age of Onset    Heart disease Father     Heart disease Brother     Hypertension Brother            Review of Systems  General: negative for chills, fever or weight loss  Psychological: negative for mood changes or depression  Ophthalmic: negative for blurry vision, photophobia or eye pain  ENT: see HPI  Respiratory: no cough, shortness of breath, or wheezing  Cardiovascular: no chest pain or dyspnea on exertion  Gastrointestinal: no abdominal pain, change in bowel habits, or black/ bloody stools  Musculoskeletal: negative for gait disturbance or muscular weakness  Neurological: no syncope or seizures; no ataxia  Dermatological: negative for puritis,  rash and jaundice  Hematologic/lymphatic: no easy bruising, no new lumps or bumps      Physical Exam:    Vitals:    01/09/19 0935   BP: (!) 176/83   Pulse: 78   Temp: 97.4 °F (36.3 °C)       Constitutional: Well appearing / communicating without difficutly.  NAD.  Eyes: EOM I Bilaterally  Head/Face: Normocephalic.  Negative paranasal sinus pressure/tenderness.  Salivary glands WNL.  House Brackmann I Bilaterally.    Right Ear: Auricle normal appearance. External Auditory Canal within normal limits,TM:  effusion resolved. TM mobility limited.    Left Ear: Auricle normal appearance. External Auditory  Canal WNL,TM retracted. TM mobility limited.  Nose: No gross nasal septal deviation. Inferior Turbinates edematous 3+ bilaterally. No septal perforation. No masses/lesions. External nasal skin appears normal without masses/lesions. Thick purulent crusts resolved.   Oral Cavity: Gingiva/lips within normal limits.  Dentition/gingiva healthy appearing. Mucus membranes moist. Floor of mouth soft, no masses palpated. Oral Tongue mobile. Hard Palate appears normal.    Oropharynx: Base of tongue appears normal. No masses/lesions noted. Tonsillar fossa/pharyngeal wall without lesions. Posterior oropharynx WNL.  Soft palate without masses. Midline uvula.   Neck/Lymphatic: No LAD I-VI bilaterally.  No thyromegaly.  No masses noted on exam.    Mirror laryngoscopy/nasopharyngoscopy: Active gag reflex.  Unable to perform.    Neuro/Psychiatric: AOx3.  Normal mood and affect.   Cardiovascular: Normal carotid pulses bilaterally, no increasing jugular venous distention noted at cervical region bilaterally.    Respiratory: Normal respiratory effort, no stridor, no retractions noted.    See separate procedure note for nasal endoscopy.      Assessment:    ICD-10-CM ICD-9-CM    1. Other chronic sinusitis J32.8 473.8    2. Polyp of paranasal sinus J33.8 471.8    3. Non-seasonal allergic rhinitis, unspecified trigger J30.89 477.8      The primary encounter diagnosis was Other chronic sinusitis. Diagnoses of Polyp of paranasal sinus and Non-seasonal allergic rhinitis, unspecified trigger were also pertinent to this visit.      Plan:  CRS with polyposis: Some improvement noted on exam. Continue the daily use of compounded isotonic saline irrigation to treat her recalcitrant sinonasal inflammation.  She was counseled on the off-label nature of this therapy and she consented to its use. Continue Singulair. Discontinue Omnaris. We again discussed ordering a CT scan of the sinuses to further evaluate the  polyposis.  The patient remains  reluctant to undergo CT scan and she is unsure if she would pursue surgery at this time. We discussed that we will maximize medical management and have her follow up in 1 month for recheck.     Rosangela Isabel MD

## 2019-01-18 ENCOUNTER — OFFICE VISIT (OUTPATIENT)
Dept: INTERNAL MEDICINE | Facility: CLINIC | Age: 84
End: 2019-01-18
Payer: MEDICARE

## 2019-01-18 ENCOUNTER — LAB VISIT (OUTPATIENT)
Dept: LAB | Facility: HOSPITAL | Age: 84
End: 2019-01-18
Attending: INTERNAL MEDICINE
Payer: MEDICARE

## 2019-01-18 VITALS
HEIGHT: 65 IN | RESPIRATION RATE: 14 BRPM | WEIGHT: 137.38 LBS | HEART RATE: 88 BPM | SYSTOLIC BLOOD PRESSURE: 116 MMHG | BODY MASS INDEX: 22.89 KG/M2 | TEMPERATURE: 98 F | DIASTOLIC BLOOD PRESSURE: 86 MMHG

## 2019-01-18 DIAGNOSIS — E11.9 DIABETES MELLITUS WITHOUT COMPLICATION: ICD-10-CM

## 2019-01-18 DIAGNOSIS — E11.59 HYPERTENSION ASSOCIATED WITH DIABETES: ICD-10-CM

## 2019-01-18 DIAGNOSIS — I15.2 HYPERTENSION ASSOCIATED WITH DIABETES: ICD-10-CM

## 2019-01-18 DIAGNOSIS — E11.9 DIABETES MELLITUS WITHOUT COMPLICATION: Primary | ICD-10-CM

## 2019-01-18 DIAGNOSIS — R19.7 DIARRHEA, UNSPECIFIED TYPE: ICD-10-CM

## 2019-01-18 LAB
ESTIMATED AVG GLUCOSE: 151 MG/DL
HBA1C MFR BLD HPLC: 6.9 %

## 2019-01-18 PROCEDURE — 99214 PR OFFICE/OUTPT VISIT, EST, LEVL IV, 30-39 MIN: ICD-10-PCS | Mod: S$PBB,,, | Performed by: INTERNAL MEDICINE

## 2019-01-18 PROCEDURE — 99213 OFFICE O/P EST LOW 20 MIN: CPT | Mod: PBBFAC,PO | Performed by: INTERNAL MEDICINE

## 2019-01-18 PROCEDURE — 36415 COLL VENOUS BLD VENIPUNCTURE: CPT | Mod: PO

## 2019-01-18 PROCEDURE — 99999 PR PBB SHADOW E&M-EST. PATIENT-LVL III: ICD-10-PCS | Mod: PBBFAC,,, | Performed by: INTERNAL MEDICINE

## 2019-01-18 PROCEDURE — 99999 PR PBB SHADOW E&M-EST. PATIENT-LVL III: CPT | Mod: PBBFAC,,, | Performed by: INTERNAL MEDICINE

## 2019-01-18 PROCEDURE — 99214 OFFICE O/P EST MOD 30 MIN: CPT | Mod: S$PBB,,, | Performed by: INTERNAL MEDICINE

## 2019-01-18 PROCEDURE — 83036 HEMOGLOBIN GLYCOSYLATED A1C: CPT

## 2019-01-18 RX ORDER — IRBESARTAN 300 MG/1
300 TABLET ORAL NIGHTLY
Qty: 90 TABLET | Refills: 3 | Status: SHIPPED | OUTPATIENT
Start: 2019-01-18 | End: 2020-01-27 | Stop reason: SDUPTHER

## 2019-01-18 RX ORDER — IRBESARTAN 300 MG/1
300 TABLET ORAL NIGHTLY
Qty: 90 TABLET | Refills: 3 | Status: SHIPPED | OUTPATIENT
Start: 2019-01-18 | End: 2019-01-18 | Stop reason: SDUPTHER

## 2019-01-18 RX ORDER — BUDESONIDE 0.5 MG/2ML
INHALANT ORAL
COMMUNITY
Start: 2018-12-11 | End: 2020-09-28

## 2019-01-18 RX ORDER — CLINDAMYCIN HCL
POWDER (GRAM) MISCELLANEOUS
Status: ON HOLD | COMMUNITY
Start: 2018-12-11 | End: 2020-09-30

## 2019-01-28 ENCOUNTER — TELEPHONE (OUTPATIENT)
Dept: INTERNAL MEDICINE | Facility: CLINIC | Age: 84
End: 2019-01-28

## 2019-01-28 NOTE — TELEPHONE ENCOUNTER
----- Message from Karen Griffiths sent at 1/28/2019 10:05 AM CST -----  Contact: Pt Mobile/Home [phone]   Patient is calling in regards to her medication for irbesartan (AVAPRO) 300 MG tablet. She said that she heard that there's a recall on them and she would like a call back to discuss this with you please.

## 2019-01-28 NOTE — TELEPHONE ENCOUNTER
Standard response--if her pharmacy cannot clarify if she has recalled batch then we will change to new med

## 2019-01-29 NOTE — TELEPHONE ENCOUNTER
----- Message from Windy Mendenhall sent at 1/29/2019  9:51 AM CST -----  Contact: self/[phone]  Pt is calling to speak with someone in the office in regards to the medication irbesartan (AVAPRO) 300 MG tablet. Pt states that she did receive a letter from her pharmacy stating that this medication has been recalled. Pt states that the letter states to go bring the medication to the pharmacy and they will get in touch with the office. Pt states that she would like to see if  can change the prescription now. Please advise.          Thank You

## 2019-02-13 ENCOUNTER — OFFICE VISIT (OUTPATIENT)
Dept: URGENT CARE | Facility: CLINIC | Age: 84
End: 2019-02-13
Payer: MEDICARE

## 2019-02-13 VITALS
BODY MASS INDEX: 21.49 KG/M2 | RESPIRATION RATE: 22 BRPM | WEIGHT: 129 LBS | TEMPERATURE: 99 F | SYSTOLIC BLOOD PRESSURE: 152 MMHG | HEART RATE: 112 BPM | OXYGEN SATURATION: 95 % | DIASTOLIC BLOOD PRESSURE: 76 MMHG | HEIGHT: 65 IN

## 2019-02-13 DIAGNOSIS — J18.9 PNEUMONIA DUE TO INFECTIOUS ORGANISM, UNSPECIFIED LATERALITY, UNSPECIFIED PART OF LUNG: Primary | ICD-10-CM

## 2019-02-13 PROCEDURE — 99214 PR OFFICE/OUTPT VISIT, EST, LEVL IV, 30-39 MIN: ICD-10-PCS | Mod: 25,S$GLB,, | Performed by: PHYSICIAN ASSISTANT

## 2019-02-13 PROCEDURE — 99214 OFFICE O/P EST MOD 30 MIN: CPT | Mod: 25,S$GLB,, | Performed by: PHYSICIAN ASSISTANT

## 2019-02-13 PROCEDURE — 96372 PR INJECTION,THERAP/PROPH/DIAG2ST, IM OR SUBCUT: ICD-10-PCS | Mod: 59,S$GLB,, | Performed by: PHYSICIAN ASSISTANT

## 2019-02-13 PROCEDURE — 94640 AIRWAY INHALATION TREATMENT: CPT | Mod: S$GLB,,, | Performed by: PHYSICIAN ASSISTANT

## 2019-02-13 PROCEDURE — 94640 PR INHAL RX, AIRWAY OBST/DX SPUTUM INDUCT: ICD-10-PCS | Mod: S$GLB,,, | Performed by: PHYSICIAN ASSISTANT

## 2019-02-13 PROCEDURE — 71046 X-RAY EXAM CHEST 2 VIEWS: CPT | Mod: S$GLB,,, | Performed by: RADIOLOGY

## 2019-02-13 PROCEDURE — 96372 THER/PROPH/DIAG INJ SC/IM: CPT | Mod: 59,S$GLB,, | Performed by: PHYSICIAN ASSISTANT

## 2019-02-13 PROCEDURE — 71046 XR CHEST PA AND LATERAL: ICD-10-PCS | Mod: S$GLB,,, | Performed by: RADIOLOGY

## 2019-02-13 RX ORDER — ALBUTEROL SULFATE 0.83 MG/ML
2.5 SOLUTION RESPIRATORY (INHALATION)
Status: COMPLETED | OUTPATIENT
Start: 2019-02-13 | End: 2019-02-13

## 2019-02-13 RX ORDER — ALBUTEROL SULFATE 1.25 MG/3ML
1.25 SOLUTION RESPIRATORY (INHALATION) EVERY 6 HOURS PRN
Qty: 1 BOX | Refills: 0 | Status: SHIPPED | OUTPATIENT
Start: 2019-02-13 | End: 2020-02-13

## 2019-02-13 RX ORDER — PREDNISONE 20 MG/1
40 TABLET ORAL DAILY
Qty: 10 TABLET | Refills: 0 | Status: SHIPPED | OUTPATIENT
Start: 2019-02-13 | End: 2019-02-18

## 2019-02-13 RX ORDER — MOXIFLOXACIN HYDROCHLORIDE 400 MG/1
400 TABLET ORAL DAILY
Qty: 10 TABLET | Refills: 0 | Status: SHIPPED | OUTPATIENT
Start: 2019-02-13 | End: 2019-02-23

## 2019-02-13 RX ORDER — ALBUTEROL SULFATE 90 UG/1
1-2 AEROSOL, METERED RESPIRATORY (INHALATION) EVERY 4 HOURS PRN
Qty: 1 INHALER | Refills: 1 | Status: SHIPPED | OUTPATIENT
Start: 2019-02-13 | End: 2019-03-15

## 2019-02-13 RX ORDER — IPRATROPIUM BROMIDE 0.5 MG/2.5ML
0.5 SOLUTION RESPIRATORY (INHALATION)
Status: COMPLETED | OUTPATIENT
Start: 2019-02-13 | End: 2019-02-13

## 2019-02-13 RX ADMIN — IPRATROPIUM BROMIDE 0.5 MG: 0.5 SOLUTION RESPIRATORY (INHALATION) at 09:02

## 2019-02-13 RX ADMIN — ALBUTEROL SULFATE 2.5 MG: 0.83 SOLUTION RESPIRATORY (INHALATION) at 08:02

## 2019-02-13 RX ADMIN — ALBUTEROL SULFATE 2.5 MG: 0.83 SOLUTION RESPIRATORY (INHALATION) at 10:02

## 2019-02-13 NOTE — PATIENT INSTRUCTIONS
"- Rest.    - Drink plenty of fluids.    - Tylenol or Ibuprofen as directed as needed for fever/pain.    - Take over-the-counter claritin, zyrtec, allegra, or xyzal as directed.  You should NOT use a decongestant form (D) of this medication if you have a history of hypertension or heart disease.   - Take over the counter cough medication as directed as needed for cough.  You should avoid ones with "D" if you have high blood pressure.   - Follow up with your PCP or specialty clinic as directed in the next 1-2 weeks if not improved or as needed.  You can call [phone] to schedule an appointment with the appropriate provider.    - Go to the ED if your symptoms worsen.  - You must understand that you have received an Urgent Care treatment only and that you may be released before all of your medical problems are known or treated.   - You, the patient, will arrange for follow up care as instructed.   - If your condition worsens or fails to improve we recommend that you receive another evaluation at the ER immediately or contact your PCP to discuss your concerns or return here.       Pneumonia (Adult)  Pneumonia is an infection deep within the lungs. It is in the small air sacs (alveoli). Pneumonia may be caused by a virus or bacteria. Pneumonia caused by bacteria is usually treated with an antibiotic. Severe cases may need to be treated in the hospital. Milder cases can be treated at home. Symptoms usually start to get better during the first 2 days of treatment.    Home care  Follow these guidelines when caring for yourself at home:  · Rest at home for the first 2 to 3 days, or until you feel stronger. Dont let yourself get overly tired when you go back to your activities.  · Stay away from cigarette smoke - yours or other peoples.  · You may use acetaminophen or ibuprofen to control fever or pain, unless another medicine was prescribed. If you have chronic liver or kidney disease, talk with your healthcare " provider before using these medicines. Also talk with your provider if youve had a stomach ulcer or gastrointestinal bleeding. Dont give aspirin to anyone younger than 18 years of age who is ill with a fever. It may cause severe liver damage.  · Your appetite may be poor, so a light diet is fine.  · Drink 6 to 8 glasses of fluids every day to make sure you are getting enough fluids. Beverages can include water, sport drinks, sodas without caffeine, juices, tea, or soup. Fluids will help loosen secretions in the lung. This will make it easier for you to cough up the phlegm (sputum). If you also have heart or kidney disease, check with your healthcare provider before you drink extra fluids.  · Take antibiotic medicine prescribed until it is all gone, even if you are feeling better after a few days.  Follow-up care  Follow up with your healthcare provider in the next 2 to 3 days, or as advised. This is to be sure the medicine is helping you get better.  If you are 65 or older, you should get a pneumococcal vaccine and a yearly flu (influenza) shot. You should also get these vaccines if you have chronic lung disease like asthma, emphysema, or COPD. Recently, a second type of pneumonia vaccine has become available for everyone over 65 years old. This is in addition to the previous vaccine. Ask your provider about this.  When to seek medical advice  Call your healthcare provider right away if any of these occur:  · You dont get better within the first 48 hours of treatment  · Shortness of breath gets worse  · Rapid breathing (more than 25 breaths per minute)  · Coughing up blood  · Chest pain gets worse with breathing  · Fever of 100.4°F (38°C) or higher that doesnt get better with fever medicine  · Weakness, dizziness, or fainting that gets worse  · Thirst or dry mouth that gets worse  · Sinus pain, headache, or a stiff neck  · Chest pain not caused by coughing  Date Last Reviewed: 1/1/2017  © 5957-3433 [employer]. [address] [place] [address], [place] [address]. All rights reserved. This information is not intended as a substitute for professional medical care. Always follow your healthcare professional's instructions.        Treating Pneumonia  Pneumonia is an infection of one or both of the lungs. Pneumonia:  · Is usually caused by either a virus or a bacteria  · Can be very serious, especially in infants, young children, and older adults. Its also serious for those with other long-term health problems or weakened immune systems.  · Is sometimes treated at home and sometimes in the hospital    Antibiotic medicines  Antibiotics may be prescribed for pneumonia caused by bacteria. They may be pills (oral medicines), or shots (injections). Or they may be given by IV (intravenously) into a vein. If you are taking oral medicines at home:  · Fill your prescription and start taking your medicine as soon as you can.  · You will likely start to feel better in a day or 2, but dont stop taking the antibiotic.  · Use a pill organizer to help you remember to take your medicine.  · Let your healthcare provider know if you have side effects.  · Take your medicine exactly as directed on the label. Talk to your provider or pharmacist if you have any questions.  Antiviral medicines  Antiviral medicine may be prescribed for pneumonia caused by a virus. For example, antiviral medicine may be prescribed for pneumonia caused by the flu virus. Antibiotics do not work against viruses. If you are taking antiviral medicine at home:  · Fill your prescription and start taking your medicine as soon as you can.  · Talk with your provider or pharmacist about possible side effects.  · Take the medicine exactly as instructed.  To relieve symptoms  There are many medicines that can help relieve symptoms of pneumonia. Some are prescription and some are over-the-counter.  Your healthcare provider may recommend:  · Acetaminophen or ibuprofen to lower  your fever and to lessen headache or other pain  · Cough medicine to loosen mucus or to reduce coughing  Make sure you check with your healthcare provider or pharmacist before taking any over-the-counter medicines.  Special treatments  If you are hospitalized for pneumonia, you may have other therapies, including:  · Inhaled medicines to help with breathing or chest congestion  · Supplemental oxygen to increase low oxygen levels  Drink fluids and eat healthy  You should eat healthy to help your body fight the infection. Drinking a lot of fluids helps to replace fluids lost from fever and to loosen mucus in your chest.  · Diet. Make healthy food choices, including fruits and vegetables, lean meats and other proteins, 100% whole grain and low- or no-fat dairy products.  · Fluids. Drink at least 6 to 8 tall glasses a day. Water and 100% fruit or vegetable juice are best.  Get plenty of rest and sleep  You may be more tired than usual for a while. It is important to get enough sleep at night. Its also important to rest during the day. Talk with your healthcare provider if coughing or other symptoms are interfering with your sleep.  Preventing the spread of germs  The best thing you can do to prevent spreading germs is to wash your hands often. You should:  · Rub your hand with soap and water for 20 to 30 seconds.  · Clean in between your fingers, the backs of your hands, and around your nails.  · Dry your hands on a separate towel or use paper towels.  You should also:  · Keep alcohol-based hand  nearby.  · Make sure you also clean surfaces that you touch. Use a product that kills all types of germs.  · Stay away from others until you are feeling better.  When to call your healthcare provider  Call your healthcare provider if you have any of the following:  · Symptoms get worse  · Fever continues  · Shortness of breath gets worse  · Increased mucus or mucus that is darker in color  · Coughing gets  worse  · Lips or fingers are bluish in color  · Side effects from your medicine   Date Last Reviewed: 12/1/2016  © 9467-9605 [employer]. [address] [place] [address], [place] [address]. All rights reserved. This information is not intended as a substitute for professional medical care. Always follow your healthcare professional's instructions.

## 2019-02-13 NOTE — PROGRESS NOTES
"Subjective:       Patient ID: Sussy Costa is a 83 y.o. female.    Vitals:  height is 5' 5" (1.651 m) and weight is 58.5 kg (129 lb). Her oral temperature is 98.7 °F (37.1 °C). Her blood pressure is 152/76 (abnormal) and her pulse is 112 (abnormal). Her respiration is 22 (abnormal) and oxygen saturation is 95%.     Chief Complaint: Cough    This is a 83 y.o. female who presents today with a chief complaint of URI symptoms for approximately 6 days.  Pt states she is coughing and wheezing.  She got worse yesterday.  No fever.  She has some sinus congestion.  Pt has a history of asthma.  She last used her home nebulizer last night.      Cough   This is a new problem. The current episode started in the past 7 days (Thursday). The problem has been gradually worsening. The problem occurs every few minutes. The cough is productive of sputum. Associated symptoms include wheezing. Pertinent negatives include no chills, ear pain, eye redness, fever, hemoptysis, myalgias, rash, sore throat or shortness of breath. Nothing aggravates the symptoms.       Constitution: Negative for chills, sweating, fatigue and fever.   HENT: Negative for ear pain, congestion, sinus pain, sinus pressure, sore throat and voice change.    Neck: Negative for painful lymph nodes.   Eyes: Negative for eye redness.   Respiratory: Positive for cough, wheezing and asthma. Negative for chest tightness, sputum production, bloody sputum, COPD, shortness of breath and stridor.    Gastrointestinal: Negative for nausea and vomiting.   Musculoskeletal: Negative for muscle ache.   Skin: Negative for rash and erythema.   Allergic/Immunologic: Positive for asthma and flu shot. Negative for seasonal allergies.   Hematologic/Lymphatic: Negative for swollen lymph nodes.       Objective:      Physical Exam   Constitutional: She is oriented to person, place, and time. She appears well-developed and well-nourished.   HENT:   Head: Normocephalic and atraumatic. "   Right Ear: Hearing, tympanic membrane, external ear and ear canal normal.   Left Ear: Hearing, tympanic membrane, external ear and ear canal normal.   Mouth/Throat: Uvula is midline and oropharynx is clear and moist.   Eyes: Conjunctivae are normal.   Neck: Normal range of motion. Neck supple. No thyromegaly present.   Cardiovascular: Regular rhythm. Tachycardia present. Exam reveals no gallop and no friction rub.   No murmur heard.  Pulmonary/Chest: Effort normal. She has wheezes (inspiratory and expiratory, all lung fields). She has no rales.   Musculoskeletal: Normal range of motion.   Lymphadenopathy:     She has no cervical adenopathy.   Neurological: She is alert and oriented to person, place, and time.   Skin: Skin is warm and dry. No rash noted. No erythema.   Psychiatric: She has a normal mood and affect. Her behavior is normal. Judgment and thought content normal.   Nursing note and vitals reviewed.      9:39 AM - patient still with inspiratory and expiratory wheezing.  Her O2 sat is still at about 90%.  Chest x-ray shows possible infiltrate.  I will give her Solu-Medrol injection and another albuterol treatment in clinic and reassess.    Xr Chest Pa And Lateral    Result Date: 2/13/2019  EXAMINATION: XR CHEST PA AND LATERAL CLINICAL HISTORY: Bronchitis, not specified as acute or chronic FINDINGS: Heart size is normal.  There is mild interstitial prominence mid and lower lung slightly worse from the prior study 11/03/2018.  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     See above Possible mild interstitial pneumonia versus edema. Electronically signed by: Per Patel MD Date:    02/13/2019 Time:    09:32    10:31 AM - patient feels much better after 2nd breathing treatment.  She does still have some mild inspiratory wheezes and expiratory wheezing.  Her oxygen saturation is up to 95% and  bpm.  I feel that she is stable for discharge and treatment at home.  She has been advised to go to the emergency room  if her symptoms worsen.    Assessment:       1. Pneumonia due to infectious organism, unspecified laterality, unspecified part of lung        Plan:         Pneumonia due to infectious organism, unspecified laterality, unspecified part of lung  -     albuterol nebulizer solution 2.5 mg  -     ipratropium 0.02 % nebulizer solution 0.5 mg  -     XR CHEST PA AND LATERAL; Future; Expected date: 02/13/2019  -     albuterol nebulizer solution 2.5 mg  -     moxifloxacin (AVELOX) 400 mg tablet; Take 1 tablet (400 mg total) by mouth once daily. for 10 days  Dispense: 10 tablet; Refill: 0  -     predniSONE (DELTASONE) 20 MG tablet; Take 2 tablets (40 mg total) by mouth once daily. for 5 days  Dispense: 10 tablet; Refill: 0  -     albuterol (PROVENTIL/VENTOLIN HFA) 90 mcg/actuation inhaler; Inhale 1-2 puffs into the lungs every 4 (four) hours as needed for Wheezing.  Dispense: 1 Inhaler; Refill: 1  -     albuterol (ACCUNEB) 1.25 mg/3 mL Nebu; Take 3 mLs (1.25 mg total) by nebulization every 6 (six) hours as needed. Rescue  Dispense: 1 Box; Refill: 0    Other orders  -     methylPREDNISolone sod suc(PF) injection 125 mg        Sussy was seen today for cough.    Diagnoses and all orders for this visit:    Pneumonia due to infectious organism, unspecified laterality, unspecified part of lung  -     albuterol nebulizer solution 2.5 mg  -     ipratropium 0.02 % nebulizer solution 0.5 mg  -     XR CHEST PA AND LATERAL; Future  -     albuterol nebulizer solution 2.5 mg  -     moxifloxacin (AVELOX) 400 mg tablet; Take 1 tablet (400 mg total) by mouth once daily. for 10 days  -     predniSONE (DELTASONE) 20 MG tablet; Take 2 tablets (40 mg total) by mouth once daily. for 5 days  -     albuterol (PROVENTIL/VENTOLIN HFA) 90 mcg/actuation inhaler; Inhale 1-2 puffs into the lungs every 4 (four) hours as needed for Wheezing.  -     albuterol (ACCUNEB) 1.25 mg/3 mL Nebu; Take 3 mLs (1.25 mg total) by nebulization every 6 (six) hours as  "needed. Rescue    Other orders  -     methylPREDNISolone sod suc(PF) injection 125 mg      Patient Instructions     - Rest.    - Drink plenty of fluids.    - Tylenol or Ibuprofen as directed as needed for fever/pain.    - Take over-the-counter claritin, zyrtec, allegra, or xyzal as directed.  You should NOT use a decongestant form (D) of this medication if you have a history of hypertension or heart disease.   - Take over the counter cough medication as directed as needed for cough.  You should avoid ones with "D" if you have high blood pressure.   - Follow up with your PCP or specialty clinic as directed in the next 1-2 weeks if not improved or as needed.  You can call [phone] to schedule an appointment with the appropriate provider.    - Go to the ED if your symptoms worsen.  - You must understand that you have received an Urgent Care treatment only and that you may be released before all of your medical problems are known or treated.   - You, the patient, will arrange for follow up care as instructed.   - If your condition worsens or fails to improve we recommend that you receive another evaluation at the ER immediately or contact your PCP to discuss your concerns or return here.       Pneumonia (Adult)  Pneumonia is an infection deep within the lungs. It is in the small air sacs (alveoli). Pneumonia may be caused by a virus or bacteria. Pneumonia caused by bacteria is usually treated with an antibiotic. Severe cases may need to be treated in the hospital. Milder cases can be treated at home. Symptoms usually start to get better during the first 2 days of treatment.    Home care  Follow these guidelines when caring for yourself at home:  · Rest at home for the first 2 to 3 days, or until you feel stronger. Dont let yourself get overly tired when you go back to your activities.  · Stay away from cigarette smoke - yours or other peoples.  · You may use acetaminophen or ibuprofen to control fever or pain, " unless another medicine was prescribed. If you have chronic liver or kidney disease, talk with your healthcare provider before using these medicines. Also talk with your provider if youve had a stomach ulcer or gastrointestinal bleeding. Dont give aspirin to anyone younger than 18 years of age who is ill with a fever. It may cause severe liver damage.  · Your appetite may be poor, so a light diet is fine.  · Drink 6 to 8 glasses of fluids every day to make sure you are getting enough fluids. Beverages can include water, sport drinks, sodas without caffeine, juices, tea, or soup. Fluids will help loosen secretions in the lung. This will make it easier for you to cough up the phlegm (sputum). If you also have heart or kidney disease, check with your healthcare provider before you drink extra fluids.  · Take antibiotic medicine prescribed until it is all gone, even if you are feeling better after a few days.  Follow-up care  Follow up with your healthcare provider in the next 2 to 3 days, or as advised. This is to be sure the medicine is helping you get better.  If you are 65 or older, you should get a pneumococcal vaccine and a yearly flu (influenza) shot. You should also get these vaccines if you have chronic lung disease like asthma, emphysema, or COPD. Recently, a second type of pneumonia vaccine has become available for everyone over 65 years old. This is in addition to the previous vaccine. Ask your provider about this.  When to seek medical advice  Call your healthcare provider right away if any of these occur:  · You dont get better within the first 48 hours of treatment  · Shortness of breath gets worse  · Rapid breathing (more than 25 breaths per minute)  · Coughing up blood  · Chest pain gets worse with breathing  · Fever of 100.4°F (38°C) or higher that doesnt get better with fever medicine  · Weakness, dizziness, or fainting that gets worse  · Thirst or dry mouth that gets worse  · Sinus pain,  headache, or a stiff neck  · Chest pain not caused by coughing  Date Last Reviewed: 1/1/2017 © 2000-2017 [employer]. [address] [place] [address], [place] [address]. All rights reserved. This information is not intended as a substitute for professional medical care. Always follow your healthcare professional's instructions.        Treating Pneumonia  Pneumonia is an infection of one or both of the lungs. Pneumonia:  · Is usually caused by either a virus or a bacteria  · Can be very serious, especially in infants, young children, and older adults. Its also serious for those with other long-term health problems or weakened immune systems.  · Is sometimes treated at home and sometimes in the hospital    Antibiotic medicines  Antibiotics may be prescribed for pneumonia caused by bacteria. They may be pills (oral medicines), or shots (injections). Or they may be given by IV (intravenously) into a vein. If you are taking oral medicines at home:  · Fill your prescription and start taking your medicine as soon as you can.  · You will likely start to feel better in a day or 2, but dont stop taking the antibiotic.  · Use a pill organizer to help you remember to take your medicine.  · Let your healthcare provider know if you have side effects.  · Take your medicine exactly as directed on the label. Talk to your provider or pharmacist if you have any questions.  Antiviral medicines  Antiviral medicine may be prescribed for pneumonia caused by a virus. For example, antiviral medicine may be prescribed for pneumonia caused by the flu virus. Antibiotics do not work against viruses. If you are taking antiviral medicine at home:  · Fill your prescription and start taking your medicine as soon as you can.  · Talk with your provider or pharmacist about possible side effects.  · Take the medicine exactly as instructed.  To relieve symptoms  There are many medicines that can help relieve symptoms of pneumonia. Some are  prescription and some are over-the-counter.  Your healthcare provider may recommend:  · Acetaminophen or ibuprofen to lower your fever and to lessen headache or other pain  · Cough medicine to loosen mucus or to reduce coughing  Make sure you check with your healthcare provider or pharmacist before taking any over-the-counter medicines.  Special treatments  If you are hospitalized for pneumonia, you may have other therapies, including:  · Inhaled medicines to help with breathing or chest congestion  · Supplemental oxygen to increase low oxygen levels  Drink fluids and eat healthy  You should eat healthy to help your body fight the infection. Drinking a lot of fluids helps to replace fluids lost from fever and to loosen mucus in your chest.  · Diet. Make healthy food choices, including fruits and vegetables, lean meats and other proteins, 100% whole grain and low- or no-fat dairy products.  · Fluids. Drink at least 6 to 8 tall glasses a day. Water and 100% fruit or vegetable juice are best.  Get plenty of rest and sleep  You may be more tired than usual for a while. It is important to get enough sleep at night. Its also important to rest during the day. Talk with your healthcare provider if coughing or other symptoms are interfering with your sleep.  Preventing the spread of germs  The best thing you can do to prevent spreading germs is to wash your hands often. You should:  · Rub your hand with soap and water for 20 to 30 seconds.  · Clean in between your fingers, the backs of your hands, and around your nails.  · Dry your hands on a separate towel or use paper towels.  You should also:  · Keep alcohol-based hand  nearby.  · Make sure you also clean surfaces that you touch. Use a product that kills all types of germs.  · Stay away from others until you are feeling better.  When to call your healthcare provider  Call your healthcare provider if you have any of the following:  · Symptoms get worse  · Fever  continues  · Shortness of breath gets worse  · Increased mucus or mucus that is darker in color  · Coughing gets worse  · Lips or fingers are bluish in color  · Side effects from your medicine   Date Last Reviewed: 12/1/2016  © 2929-8830 [employer]. [address] [place] [address]. All rights reserved. This information is not intended as a substitute for professional medical care. Always follow your healthcare professional's instructions.

## 2019-03-01 ENCOUNTER — OFFICE VISIT (OUTPATIENT)
Dept: INTERNAL MEDICINE | Facility: CLINIC | Age: 84
End: 2019-03-01
Payer: MEDICARE

## 2019-03-01 VITALS
HEIGHT: 65 IN | TEMPERATURE: 98 F | SYSTOLIC BLOOD PRESSURE: 130 MMHG | HEART RATE: 88 BPM | WEIGHT: 131.38 LBS | DIASTOLIC BLOOD PRESSURE: 70 MMHG | BODY MASS INDEX: 21.89 KG/M2

## 2019-03-01 DIAGNOSIS — E11.9 DIABETES MELLITUS WITHOUT COMPLICATION: Primary | ICD-10-CM

## 2019-03-01 DIAGNOSIS — J18.9 PNEUMONIA DUE TO INFECTIOUS ORGANISM, UNSPECIFIED LATERALITY, UNSPECIFIED PART OF LUNG: ICD-10-CM

## 2019-03-01 DIAGNOSIS — I15.2 HYPERTENSION ASSOCIATED WITH DIABETES: ICD-10-CM

## 2019-03-01 DIAGNOSIS — E11.59 HYPERTENSION ASSOCIATED WITH DIABETES: ICD-10-CM

## 2019-03-01 PROCEDURE — 99213 OFFICE O/P EST LOW 20 MIN: CPT | Mod: PBBFAC,PO | Performed by: INTERNAL MEDICINE

## 2019-03-01 PROCEDURE — 99999 PR PBB SHADOW E&M-EST. PATIENT-LVL III: CPT | Mod: PBBFAC,,, | Performed by: INTERNAL MEDICINE

## 2019-03-01 PROCEDURE — 99214 PR OFFICE/OUTPT VISIT, EST, LEVL IV, 30-39 MIN: ICD-10-PCS | Mod: S$PBB,,, | Performed by: INTERNAL MEDICINE

## 2019-03-01 PROCEDURE — 99999 PR PBB SHADOW E&M-EST. PATIENT-LVL III: ICD-10-PCS | Mod: PBBFAC,,, | Performed by: INTERNAL MEDICINE

## 2019-03-01 PROCEDURE — 99214 OFFICE O/P EST MOD 30 MIN: CPT | Mod: S$PBB,,, | Performed by: INTERNAL MEDICINE

## 2019-03-01 RX ORDER — GLIMEPIRIDE 2 MG/1
TABLET ORAL
Qty: 90 TABLET | Refills: 3 | Status: SHIPPED | OUTPATIENT
Start: 2019-03-01 | End: 2020-03-31 | Stop reason: SDUPTHER

## 2019-03-04 ENCOUNTER — OFFICE VISIT (OUTPATIENT)
Dept: PODIATRY | Facility: CLINIC | Age: 84
End: 2019-03-04
Payer: MEDICARE

## 2019-03-04 VITALS
SYSTOLIC BLOOD PRESSURE: 124 MMHG | DIASTOLIC BLOOD PRESSURE: 77 MMHG | BODY MASS INDEX: 21.05 KG/M2 | WEIGHT: 131 LBS | HEIGHT: 66 IN | HEART RATE: 89 BPM

## 2019-03-04 DIAGNOSIS — B35.1 ONYCHOMYCOSIS DUE TO DERMATOPHYTE: Primary | ICD-10-CM

## 2019-03-04 PROCEDURE — 99999 PR PBB SHADOW E&M-EST. PATIENT-LVL IV: CPT | Mod: PBBFAC,,, | Performed by: PODIATRIST

## 2019-03-04 PROCEDURE — 99499 NO LOS: ICD-10-PCS | Mod: ,,, | Performed by: PODIATRIST

## 2019-03-04 PROCEDURE — 17999 PR NON-COVERED FOOT CARE: ICD-10-PCS | Mod: CSM,S$GLB,, | Performed by: PODIATRIST

## 2019-03-04 PROCEDURE — 99214 OFFICE O/P EST MOD 30 MIN: CPT | Mod: PBBFAC | Performed by: PODIATRIST

## 2019-03-04 PROCEDURE — 99999 PR PBB SHADOW E&M-EST. PATIENT-LVL IV: ICD-10-PCS | Mod: PBBFAC,,, | Performed by: PODIATRIST

## 2019-03-04 PROCEDURE — 17999 UNLISTD PX SKN MUC MEMB SUBQ: CPT | Mod: CSM,S$GLB,, | Performed by: PODIATRIST

## 2019-03-04 PROCEDURE — 99499 UNLISTED E&M SERVICE: CPT | Mod: ,,, | Performed by: PODIATRIST

## 2019-03-05 NOTE — PROGRESS NOTES
History of present illness:  83-year-old lady with hypertension diabetes mellitus dyslipidemia and others following up on a couple of issues.  She is following up from a recent visit January 18, 2019 at which time we discontinued lisinopril and began irbesartan.  We also discontinued her metformin due to possible diarrhea.  She reports her diarrhea has completely resolved with the cessation of metformin therapy.  Also following up from recent urgent care visit on February 13, 2019 she presented with respiratory symptomatology chest x-ray suggestive of an interstitial pneumonia which is other she was started on antibiotic therapy and she reports symptoms are much improved.  Currently without fever chills body aches.  No chest pain or palpitations.    Current medication:  All medications are noted reviewed in our electronic medical record medication list.    Review of systems:  Constitutional:  No fever no chills.  HEENT:  No hoarseness no dysphagia.  Respiratory:  Mild occasional cough.  Denies any overt shortness of breath.  No chest pain. No hemoptysis.  Cardiovascular:  No chest pain palpitations syncope or presyncope.  No edema.  GI:  Diarrhea has resolved.  No abdominal pain nausea no.    Past medical history, past surgical history, family medical history social history is are all noted reviewed in our electronic medical record history sections.    Physical examination:  General:  Alert pleasant appropriately groomed lady no acute distress.  Vital signs:  Blood pressure taken manually by this examiner is 130/70.  HEENT:  Neck supple no masses.  Mouth and pharynx normal no thrush.  Lungs:  Clear to auscultation and to percussion.  Cardiovascular:  Regular rate and rhythm. 1/6 systolic murmur.  No JVD. No peripheral extremity edema.    Data:  Reviewed recent urgent care visit and chest x-ray radiographic report.  Reviewed recent lab data glycohemoglobin    Impression:  Hypertension currently reasonably  controlled.  Diarrhea resolved with cessation of metformin.  Recent respiratory illness clinically improved will require follow-up.    Plan:  She will monitor her blood sugars regularly and we discussed parameters are contacting the office.  Lab data in 8 weeks to include basic metabolic profile glycohemoglobin.  Repeat chest x-ray in 2 weeks.  Return to clinic in 4 months.

## 2019-03-05 NOTE — PROGRESS NOTES
History of present illness:  83-year-old lady with hypertension diabetes mellitus dyslipidemia following up on those issues.  The patient reports that generally she is doing well.  Taking medications as directed.  No chest pain palpitations syncope edema or claudication.    Current medications:  All medications are noted and reviewed in the electronic medical record medication list.    Review of systems:  Constitutional systems:  Fever chills body aches  Respiratory:  No cough shortness of breath.  Cardiovascular:  No chest pain palpitations syncope claudication  GI:  No nausea vomiting pain .  She is describing some persistent off and on loose bowels diarrhea.  :  No dysuria frequency change in the color character urine.    Past medical history, past surgical history, family medical history social history is are all noted and reviewed in our electronic medical record history sections.    Physical examination:  General:  Alert pleasant appropriately groomed lady no acute distress.  Vital signs:  Blood pressure taken manually by this examiner is 140/86.  Other vital signs noted reviewed is normal.  Eyes:  Sclerae white not icteric.  HEENT:  Normocephalic.  Neck supple no masses no thyromegaly.  Lungs:  Clear to auscultation.  Cardiovascular:  Regular rate and rhythm. 1/6 systolic murmur..  No JVD.  Carotids full bilaterally without bruits.  No ischemic changes of lower extremities.  Mental status:  Alert oriented affect and mood are all appropriate.    Impression:  Hypertension, controlled borderline.  Diabetes mellitus.  Diarrhea suspect related to metformin therapy.  Dyslipidemia on statin therapy.    Plan:  Discontinue lisinopril in light of recent potential adverse issues with such.  Begin irbesartan 300 mg daily.  Discontinue metformin.  Monitor blood sugars.  Return to clinic in 4-6 weeks.

## 2019-03-13 ENCOUNTER — OFFICE VISIT (OUTPATIENT)
Dept: OTOLARYNGOLOGY | Facility: CLINIC | Age: 84
End: 2019-03-13
Payer: MEDICARE

## 2019-03-13 VITALS
WEIGHT: 137.13 LBS | BODY MASS INDEX: 22.04 KG/M2 | DIASTOLIC BLOOD PRESSURE: 81 MMHG | HEIGHT: 66 IN | SYSTOLIC BLOOD PRESSURE: 163 MMHG | HEART RATE: 70 BPM

## 2019-03-13 DIAGNOSIS — J30.89 NON-SEASONAL ALLERGIC RHINITIS, UNSPECIFIED TRIGGER: ICD-10-CM

## 2019-03-13 DIAGNOSIS — J33.8 POLYP OF PARANASAL SINUS: ICD-10-CM

## 2019-03-13 DIAGNOSIS — J32.8 OTHER CHRONIC SINUSITIS: Primary | ICD-10-CM

## 2019-03-13 PROCEDURE — 99213 PR OFFICE/OUTPT VISIT, EST, LEVL III, 20-29 MIN: ICD-10-PCS | Mod: 25,S$PBB,, | Performed by: OTOLARYNGOLOGY

## 2019-03-13 PROCEDURE — 99999 PR PBB SHADOW E&M-EST. PATIENT-LVL IV: CPT | Mod: PBBFAC,,, | Performed by: OTOLARYNGOLOGY

## 2019-03-13 PROCEDURE — 31231 PR NASAL ENDOSCOPY, DX: ICD-10-PCS | Mod: S$PBB,,, | Performed by: OTOLARYNGOLOGY

## 2019-03-13 PROCEDURE — 99999 PR PBB SHADOW E&M-EST. PATIENT-LVL IV: ICD-10-PCS | Mod: PBBFAC,,, | Performed by: OTOLARYNGOLOGY

## 2019-03-13 PROCEDURE — 31231 NASAL ENDOSCOPY DX: CPT | Mod: S$PBB,,, | Performed by: OTOLARYNGOLOGY

## 2019-03-13 PROCEDURE — 99213 OFFICE O/P EST LOW 20 MIN: CPT | Mod: 25,S$PBB,, | Performed by: OTOLARYNGOLOGY

## 2019-03-13 PROCEDURE — 31231 NASAL ENDOSCOPY DX: CPT | Mod: PBBFAC,PN | Performed by: OTOLARYNGOLOGY

## 2019-03-13 PROCEDURE — 99214 OFFICE O/P EST MOD 30 MIN: CPT | Mod: PBBFAC,PN,25 | Performed by: OTOLARYNGOLOGY

## 2019-03-13 NOTE — PROGRESS NOTES
Chief Complaint   Patient presents with    Follow-up    Sinusitis     improved   .    HPI:     Sussy Costa is a 83 y.o. female who is known to me from my previous practice with chronic sinusitis who presents today with right otalgia for 2 weeks. She reports that the pain in mild in nature and describes it as a sharp intermittent pain.  She states it is gradually worsening.  She notes right aural fullness with muffled hearing in the right ear only.  She reports that she is not having any nasal congestion, facial pain or pressure, rhinorrhea, or post-nasal drip at present.  She using nasal rinses.  She continues on Singulair, Omnaris.     Interval HPI 3/13/2019:   F/u CRS with nasals polyposis.  Mrs. Costa has been using her compounded nasal saline irrigations BID as directed.  She feels this has been beneficial. She denies facial pain and pressure.  She notes decreased rhinorrhea and nasal crusting. She continues on Singulair.  She reports that since her last visit she was treated for pneumonia.     Past Medical History:   Diagnosis Date    Asthma     Cyst of pancreas     liver    Diabetes mellitus type II     Eczema of hand     Glaucoma     Hyperlipidemia     Hypertension     Lichen planus     gums    Osteoporosis     Pulmonary nodules      Social History     Socioeconomic History    Marital status:      Spouse name: sania    Number of children: 1    Years of education: Not on file    Highest education level: Not on file   Social Needs    Financial resource strain: Not on file    Food insecurity - worry: Not on file    Food insecurity - inability: Not on file    Transportation needs - medical: Not on file    Transportation needs - non-medical: Not on file   Occupational History    Occupation:    Tobacco Use    Smoking status: Never Smoker    Smokeless tobacco: Never Used   Substance and Sexual Activity    Alcohol use: Yes     Comment: socially    Drug use: No     Sexual activity: Yes     Partners: Male     Birth control/protection: Post-menopausal   Other Topics Concern    Are you pregnant or think you may be? Not Asked    Breast-feeding Not Asked   Social History Narrative    She does not exercise regularly.     Past Surgical History:   Procedure Laterality Date    CATARACT EXTRACTION, BILATERAL      CHOLECYSTECTOMY      HYSTERECTOMY      INJECTION-JOINT Right 7/10/2014    [facility]    Injection-steroid-epidural-lumbar L5-S1 N/A 11/8/2018    Performed by [facility] at [facility] PA[facility]    nasal polyps      ULTRASOUND-ENDOSCOPIC-UPPER N/A 3/18/2016    Performed by Max [facility]ado [facility] at [facility] (2ND FLR)     Family History   Problem Relation Age of Onset    Heart disease Father     Heart disease Brother     Hypertension Brother            Review of Systems  General: negative for chills, fever or weight loss  Psychological: negative for mood changes or depression  Ophthalmic: negative for blurry vision, photophobia or eye pain  ENT: see HPI  Respiratory: no cough, shortness of breath, or wheezing  Cardiovascular: no chest pain or dyspnea on exertion  Gastrointestinal: no abdominal pain, change in bowel habits, or black/ bloody stools  Musculoskeletal: negative for gait disturbance or muscular weakness  Neurological: no syncope or seizures; no ataxia  Dermatological: negative for puritis,  rash and jaundice  Hematologic/lymphatic: no easy bruising, no new lumps or bumps      Physical Exam:    Vitals:    03/13/19 0919   BP: (!) 163/81   Pulse: 70       Constitutional: Well appearing / communicating without difficutly.  NAD.  Eyes: EOM I Bilaterally  Head/Face: Normocephalic.  Negative paranasal sinus pressure/tenderness.  Salivary glands WNL.  House Brackmann I Bilaterally.    Right Ear: Auricle normal appearance. External Auditory Canal within normal limits,TM:  effusion resolved. TM mobility limited.    Left Ear:  Auricle normal appearance. External Auditory Canal WNL,TM retracted. TM mobility limited.  Nose: No gross nasal septal deviation. Inferior Turbinates edematous 3+ bilaterally. No septal perforation. No masses/lesions. External nasal skin appears normal without masses/lesions. Thick purulent crusts resolved.   Oral Cavity: Gingiva/lips within normal limits.  Dentition/gingiva healthy appearing. Mucus membranes moist. Floor of mouth soft, no masses palpated. Oral Tongue mobile. Hard Palate appears normal.    Oropharynx: Base of tongue appears normal. No masses/lesions noted. Tonsillar fossa/pharyngeal wall without lesions. Posterior oropharynx WNL.  Soft palate without masses. Midline uvula.   Neck/Lymphatic: No LAD I-VI bilaterally.  No thyromegaly.  No masses noted on exam.    Mirror laryngoscopy/nasopharyngoscopy: Active gag reflex.  Unable to perform.    Neuro/Psychiatric: AOx3.  Normal mood and affect.   Cardiovascular: Normal carotid pulses bilaterally, no increasing jugular venous distention noted at cervical region bilaterally.    Respiratory: Normal respiratory effort, no stridor, no retractions noted.    See separate procedure note for nasal endoscopy.      Assessment:    ICD-10-CM ICD-9-CM    1. Other chronic sinusitis J32.8 473.8    2. Polyp of paranasal sinus J33.8 471.8    3. Non-seasonal allergic rhinitis, unspecified trigger J30.89 477.8      The primary encounter diagnosis was Other chronic sinusitis. Diagnoses of Polyp of paranasal sinus and Non-seasonal allergic rhinitis, unspecified trigger were also pertinent to this visit.      Plan:  CRS with polyposis: Overall improvement noted on exam. Continue the daily use of compounded isotonic saline irrigation to treat her recalcitrant sinonasal inflammation.  She was counseled on the off-label nature of this therapy and she consented to its use. Continue Singulair.    Rosangela Isabel MD

## 2019-03-13 NOTE — PROCEDURES
Procedures       PROCEDURE NOTE:  Nasal endoscopy   Preprocedure diagnosis:  Chronic sinusitis with polyposis  Postprocedure diangosis:  Same  Complications:  None  Blood Loss:  None    Procedure in detail:  After verbal consent was obtained, the patient's nasal cavity was anesthesized using topical 1%lidocaine and Neosynepherine.  A rigid 0 degree endoscope was placed in first the right, then the left nasal cavity.  The inferior and middle turbinates were examined, and found to be edematous bilaterally.  The middle meatus and maxillary antrum was also examined, and found to have minimal crusting and improved appearance of mucosa without brittany polyps.    No masses seen.  The patient tolerated the procedure well and there were no complications.

## 2019-03-15 ENCOUNTER — HOSPITAL ENCOUNTER (OUTPATIENT)
Dept: RADIOLOGY | Facility: HOSPITAL | Age: 84
Discharge: HOME | End: 2019-03-15
Attending: INTERNAL MEDICINE
Payer: MEDICARE

## 2019-03-15 DIAGNOSIS — J18.9 PNEUMONIA DUE TO INFECTIOUS ORGANISM, UNSPECIFIED LATERALITY, UNSPECIFIED PART OF LUNG: ICD-10-CM

## 2019-03-15 PROCEDURE — 71046 X-RAY EXAM CHEST 2 VIEWS: CPT | Mod: 26,,, | Performed by: RADIOLOGY

## 2019-03-15 PROCEDURE — 71046 XR CHEST PA AND LATERAL: ICD-10-PCS | Mod: 26,,, | Performed by: RADIOLOGY

## 2019-03-15 PROCEDURE — 71046 X-RAY EXAM CHEST 2 VIEWS: CPT | Mod: TC,PO

## 2019-04-12 ENCOUNTER — LAB VISIT (OUTPATIENT)
Dept: LAB | Facility: HOSPITAL | Age: 84
End: 2019-04-12
Attending: INTERNAL MEDICINE
Payer: MEDICARE

## 2019-04-12 DIAGNOSIS — E11.59 HYPERTENSION ASSOCIATED WITH DIABETES: ICD-10-CM

## 2019-04-12 DIAGNOSIS — E11.9 DIABETES MELLITUS WITHOUT COMPLICATION: ICD-10-CM

## 2019-04-12 DIAGNOSIS — I15.2 HYPERTENSION ASSOCIATED WITH DIABETES: ICD-10-CM

## 2019-04-12 LAB
ANION GAP SERPL CALC-SCNC: 8 MMOL/L (ref 8–16)
BUN SERPL-MCNC: 18 MG/DL (ref 8–23)
CALCIUM SERPL-MCNC: 10.3 MG/DL (ref 8.7–10.5)
CHLORIDE SERPL-SCNC: 107 MMOL/L (ref 95–110)
CO2 SERPL-SCNC: 27 MMOL/L (ref 23–29)
CREAT SERPL-MCNC: 0.9 MG/DL (ref 0.5–1.4)
EST. GFR  (AFRICAN AMERICAN): >60 ML/MIN/1.73 M^2
EST. GFR  (NON AFRICAN AMERICAN): 59.7 ML/MIN/1.73 M^2
ESTIMATED AVG GLUCOSE: 171 MG/DL (ref 68–131)
GLUCOSE SERPL-MCNC: 154 MG/DL (ref 70–110)
HBA1C MFR BLD HPLC: 7.6 % (ref 4–5.6)
POTASSIUM SERPL-SCNC: 4.8 MMOL/L (ref 3.5–5.1)
SODIUM SERPL-SCNC: 142 MMOL/L (ref 136–145)

## 2019-04-12 PROCEDURE — 36415 COLL VENOUS BLD VENIPUNCTURE: CPT | Mod: PO

## 2019-04-12 PROCEDURE — 80048 BASIC METABOLIC PNL TOTAL CA: CPT

## 2019-04-12 PROCEDURE — 83036 HEMOGLOBIN GLYCOSYLATED A1C: CPT

## 2019-04-22 ENCOUNTER — TELEPHONE (OUTPATIENT)
Dept: ADMINISTRATIVE | Facility: HOSPITAL | Age: 84
End: 2019-04-22

## 2019-04-22 NOTE — LETTER
April 22, 2019    [place]             [facility]  [address]  [facility] [address]  Phone: [phone]   Patient: Sussy Costa    YOB: 1935   MRN: [id number]     Dr. MartinesSussy is a patient of Dr. EZ Casillas (PCP) at [facility]. While reviewing his/her chart, it has come to our attention that there is an outstanding lab/procedure. Please help keep our Health Maintenance records as accurate and as up to date as possible by supplying the following:     Most recent Diabetic Eye Exam                                                                             Please fax to [facility] @ [phone].    Thank you for your assistance in our patient's healthcare.     Sincerely,     Karol Pacheco LPN  Clinical Care Coordinator   [facility]

## 2019-04-24 ENCOUNTER — OFFICE VISIT (OUTPATIENT)
Dept: OTOLARYNGOLOGY | Facility: CLINIC | Age: 84
End: 2019-04-24
Payer: MEDICARE

## 2019-04-24 VITALS
BODY MASS INDEX: 22.46 KG/M2 | DIASTOLIC BLOOD PRESSURE: 81 MMHG | HEIGHT: 66 IN | WEIGHT: 139.75 LBS | HEART RATE: 69 BPM | SYSTOLIC BLOOD PRESSURE: 186 MMHG

## 2019-04-24 DIAGNOSIS — J30.89 NON-SEASONAL ALLERGIC RHINITIS, UNSPECIFIED TRIGGER: ICD-10-CM

## 2019-04-24 DIAGNOSIS — J33.8 POLYP OF PARANASAL SINUS: ICD-10-CM

## 2019-04-24 DIAGNOSIS — J32.8 OTHER CHRONIC SINUSITIS: Primary | ICD-10-CM

## 2019-04-24 DIAGNOSIS — H69.93 ETD (EUSTACHIAN TUBE DYSFUNCTION), BILATERAL: ICD-10-CM

## 2019-04-24 PROCEDURE — 99214 PR OFFICE/OUTPT VISIT, EST, LEVL IV, 30-39 MIN: ICD-10-PCS | Mod: 25,S$PBB,, | Performed by: OTOLARYNGOLOGY

## 2019-04-24 PROCEDURE — 99999 PR PBB SHADOW E&M-EST. PATIENT-LVL V: ICD-10-PCS | Mod: PBBFAC,,, | Performed by: OTOLARYNGOLOGY

## 2019-04-24 PROCEDURE — 99215 OFFICE O/P EST HI 40 MIN: CPT | Mod: PBBFAC,PN | Performed by: OTOLARYNGOLOGY

## 2019-04-24 PROCEDURE — 31231 NASAL ENDOSCOPY DX: CPT | Mod: S$PBB,,, | Performed by: OTOLARYNGOLOGY

## 2019-04-24 PROCEDURE — 31231 NASAL ENDOSCOPY DX: CPT | Mod: PBBFAC,PN | Performed by: OTOLARYNGOLOGY

## 2019-04-24 PROCEDURE — 99999 PR PBB SHADOW E&M-EST. PATIENT-LVL V: CPT | Mod: PBBFAC,,, | Performed by: OTOLARYNGOLOGY

## 2019-04-24 PROCEDURE — 31231 PR NASAL ENDOSCOPY, DX: ICD-10-PCS | Mod: S$PBB,,, | Performed by: OTOLARYNGOLOGY

## 2019-04-24 PROCEDURE — 99214 OFFICE O/P EST MOD 30 MIN: CPT | Mod: 25,S$PBB,, | Performed by: OTOLARYNGOLOGY

## 2019-04-24 RX ORDER — LEVOCETIRIZINE DIHYDROCHLORIDE 5 MG/1
5 TABLET, FILM COATED ORAL NIGHTLY
Qty: 30 TABLET | Refills: 11 | Status: SHIPPED | OUTPATIENT
Start: 2019-04-24 | End: 2020-05-11 | Stop reason: SDUPTHER

## 2019-04-24 RX ORDER — CIPROFLOXACIN 500 MG/1
500 TABLET ORAL 2 TIMES DAILY
Qty: 28 TABLET | Refills: 0 | Status: SHIPPED | OUTPATIENT
Start: 2019-04-24 | End: 2019-05-08

## 2019-04-24 RX ORDER — RISEDRONATE SODIUM 35 MG/1
TABLET, DELAYED RELEASE ORAL
COMMUNITY
Start: 2019-04-21 | End: 2019-06-04

## 2019-04-24 NOTE — PROGRESS NOTES
Chief Complaint   Patient presents with    Follow-up    Nasal Congestion     midface pressure, worse on right side   .    HPI:     Sussy Costa is a 83 y.o. female who is known to me from my previous practice with chronic sinusitis who presents today with right otalgia for 2 weeks. She reports that the pain in mild in nature and describes it as a sharp intermittent pain.  She states it is gradually worsening.  She notes right aural fullness with muffled hearing in the right ear only.  She reports that she is not having any nasal congestion, facial pain or pressure, rhinorrhea, or post-nasal drip at present.  She using nasal rinses.  She continues on Singulair, Omnaris.     Interval HPI 4/24/2019:   F/u CRS with nasals polyposis.  She reports increased nasal crusting bilaterally. She notes the feeling of right sided nasal obstruction and pressure in the right maxillary region. Mrs. Costa has been using her compounded nasal saline irrigations BID as directed.  She does note some purulent discharge when using her rinses over the last 1 week.  She continues on Singulair.  She notes increased sneezing and itchy eyes. She notes that her asthmas has been better under control.     Past Medical History:   Diagnosis Date    Asthma     Cyst of pancreas     liver    Diabetes mellitus type II     Eczema of hand     Glaucoma     Hyperlipidemia     Hypertension     Lichen planus     gums    Osteoporosis     Pulmonary nodules      Social History     Socioeconomic History    Marital status:      Spouse name: sania    Number of children: 1    Years of education: Not on file    Highest education level: Not on file   Occupational History    Occupation:    Social Needs    Financial resource strain: Not hard at all    Food insecurity:     Worry: Not on file     Inability: Not on file    Transportation needs:     Medical: Not on file     Non-medical: Not on file   Tobacco Use    Smoking  status: Never Smoker    Smokeless tobacco: Never Used   Substance and Sexual Activity    Alcohol use: Yes     Comment: socially    Drug use: No    Sexual activity: Yes     Partners: Male     Birth control/protection: Post-menopausal   Lifestyle    Physical activity:     Days per week: 1 day     Minutes per session: 10 min    Stress: Only a little   Relationships    Social connections:     Talks on phone: Three times a week     Gets together: Twice a week     Attends [employer] service: Not on file     Active member of club or organization: No     Attends meetings of clubs or organizations: Not on file     Relationship status:    Other Topics Concern    Are you pregnant or think you may be? Not Asked    Breast-feeding Not Asked   Social History Narrative    She does not exercise regularly.     Past Surgical History:   Procedure Laterality Date    CATARACT EXTRACTION, BILATERAL      CHOLECYSTECTOMY      HYSTERECTOMY      INJECTION-JOINT Right 7/10/2014    [facility]    Injection-steroid-epidural-lumbar L5-S1 N/A 11/8/2018    [facility]formed by [facility]    nasal polyps      ULTRASOUND-ENDOSCOPIC-UPPER N/A 3/18/2016    Performed by Max [facility]ado [facility] at [facility] ENDO (2ND FLR)     Family History   Problem Relation Age of Onset    Heart disease Father     Heart disease Brother     Hypertension Brother            Review of Systems  General: negative for chills, fever or weight loss  Psychological: negative for mood changes or depression  Ophthalmic: negative for blurry vision, photophobia or eye pain  ENT: see HPI  Respiratory: no cough, shortness of breath, or wheezing  Cardiovascular: no chest pain or dyspnea on exertion  Gastrointestinal: no abdominal pain, change in bowel habits, or black/ bloody stools  Musculoskeletal: negative for gait disturbance or muscular weakness  Neurological: no syncope or seizures; no  ataxia  Dermatological: negative for puritis,  rash and jaundice  Hematologic/lymphatic: no easy bruising, no new lumps or bumps      Physical Exam:    Vitals:    04/24/19 0908   BP: (!) 186/81   Pulse: 69       Constitutional: Well appearing / communicating without difficutly.  NAD.  Eyes: EOM I Bilaterally  Head/Face: Normocephalic.  Negative paranasal sinus pressure/tenderness.  Salivary glands WNL.  House Brackmann I Bilaterally.    Right Ear: Auricle normal appearance. External Auditory Canal within normal limits,TM:  effusion resolved. TM mobility limited.    Left Ear: Auricle normal appearance. External Auditory Canal WNL,TM retracted. TM mobility limited.  Nose: No gross nasal septal deviation. Inferior Turbinates edematous 3+ bilaterally. No septal perforation. No masses/lesions. External nasal skin appears normal without masses/lesions. Thick purulent crusts resolved.   Oral Cavity: Gingiva/lips within normal limits.  Dentition/gingiva healthy appearing. Mucus membranes moist. Floor of mouth soft, no masses palpated. Oral Tongue mobile. Hard Palate appears normal.    Oropharynx: Base of tongue appears normal. No masses/lesions noted. Tonsillar fossa/pharyngeal wall without lesions. Posterior oropharynx WNL.  Soft palate without masses. Midline uvula.   Neck/Lymphatic: No LAD I-VI bilaterally.  No thyromegaly.  No masses noted on exam.    Mirror laryngoscopy/nasopharyngoscopy: Active gag reflex.  Unable to perform.    Neuro/Psychiatric: AOx3.  Normal mood and affect.   Cardiovascular: Normal carotid pulses bilaterally, no increasing jugular venous distention noted at cervical region bilaterally.    Respiratory: Normal respiratory effort, no stridor, no retractions noted.    See separate procedure note for nasal endoscopy.      Assessment:    ICD-10-CM ICD-9-CM    1. Other chronic sinusitis J32.8 473.8 CT Medtronic Sinuses without   2. Polyp of paranasal sinus J33.8 471.8 CT Medtronic Sinuses without   3.  Non-seasonal allergic rhinitis, unspecified trigger J30.89 477.8    4. ETD (Eustachian tube dysfunction), bilateral H69.83 381.81      The primary encounter diagnosis was Other chronic sinusitis. Diagnoses of Polyp of paranasal sinus, Non-seasonal allergic rhinitis, unspecified trigger, and ETD (Eustachian tube dysfunction), bilateral were also pertinent to this visit.      Plan:  CRS with polyposis: Increased crusting and polyps noted on endoscopy.  Prescription given for Cipro 500mg PO BID for 14 days.   Continue the daily use of compounded isotonic saline irrigation to treat her recalcitrant sinonasal inflammation.  She was counseled on the off-label nature of this therapy and she consented to its use. Continue Singulair. Reassess on follow up if still evidence of polyps will obtain CT scan of the sinuses and consider ESS. The patient understands the treatment plan.    Rosangela Isabel MD

## 2019-04-26 ENCOUNTER — OFFICE VISIT (OUTPATIENT)
Dept: INTERNAL MEDICINE | Facility: CLINIC | Age: 84
End: 2019-04-26
Payer: MEDICARE

## 2019-04-26 VITALS
BODY MASS INDEX: 22.18 KG/M2 | HEIGHT: 66 IN | TEMPERATURE: 99 F | DIASTOLIC BLOOD PRESSURE: 76 MMHG | SYSTOLIC BLOOD PRESSURE: 120 MMHG | HEART RATE: 101 BPM | WEIGHT: 138 LBS

## 2019-04-26 DIAGNOSIS — E11.69 DYSLIPIDEMIA ASSOCIATED WITH TYPE 2 DIABETES MELLITUS: ICD-10-CM

## 2019-04-26 DIAGNOSIS — E78.5 DYSLIPIDEMIA ASSOCIATED WITH TYPE 2 DIABETES MELLITUS: ICD-10-CM

## 2019-04-26 DIAGNOSIS — E11.59 HYPERTENSION ASSOCIATED WITH DIABETES: Primary | ICD-10-CM

## 2019-04-26 DIAGNOSIS — I15.2 HYPERTENSION ASSOCIATED WITH DIABETES: Primary | ICD-10-CM

## 2019-04-26 DIAGNOSIS — E11.9 DIABETES MELLITUS WITHOUT COMPLICATION: ICD-10-CM

## 2019-04-26 DIAGNOSIS — R21 SKIN RASH: ICD-10-CM

## 2019-04-26 PROCEDURE — 99999 PR PBB SHADOW E&M-EST. PATIENT-LVL V: ICD-10-PCS | Mod: PBBFAC,,, | Performed by: INTERNAL MEDICINE

## 2019-04-26 PROCEDURE — 99214 OFFICE O/P EST MOD 30 MIN: CPT | Mod: S$PBB,,, | Performed by: INTERNAL MEDICINE

## 2019-04-26 PROCEDURE — 99999 PR PBB SHADOW E&M-EST. PATIENT-LVL V: CPT | Mod: PBBFAC,,, | Performed by: INTERNAL MEDICINE

## 2019-04-26 PROCEDURE — 99214 PR OFFICE/OUTPT VISIT, EST, LEVL IV, 30-39 MIN: ICD-10-PCS | Mod: S$PBB,,, | Performed by: INTERNAL MEDICINE

## 2019-04-26 PROCEDURE — 99215 OFFICE O/P EST HI 40 MIN: CPT | Mod: PBBFAC,PO | Performed by: INTERNAL MEDICINE

## 2019-04-26 NOTE — PROGRESS NOTES
This office note has been dictated.  HISTORY:  This is an 83-year-old lady with several chronic medical conditions,   following up on those issues today.  The patient has a history of hypertension,   diabetes mellitus, dyslipidemia, glaucoma, osteoporosis.  The patient a couple   of months back had been treated for bronchopneumonia.  Those symptoms have   completely resolved now and the followup chest x-ray shows clearing.  We also   discontinued metformin due to GI side effects, which have resolved since   cessation.  She reports blood sugars have been stable.  She presents a few blood   pressure readings, which seemed to be fluctuating just a bit.  She otherwise   currently feels fine with no chest pain, palpitations, syncope, cough, shortness   of breath, claudication.    CURRENT MEDICATIONS:  All medications noted and reviewed in the electronic   medical record medication list.    REVIEW OF SYSTEMS:  HEENT:  No hoarseness, no dysphagia.  CARDIOVASCULAR:  No chest pain, palpitations, syncope, claudication.  RESPIRATORY:  No cough.  SKIN:  She reports in recent months she has developed some bumps on both of her   feet and lower legs.  They do not itch and are not painful.    PAST MEDICAL HISTORY, PAST SURGICAL HISTORY, FAMILY MEDICAL HISTORY AND SOCIAL   HISTORY:  All noted and reviewed in the electronic medical record history   sections.    PHYSICAL EXAMINATION:  GENERAL:  Pleasant, alert, appropriately groomed lady in no acute distress.  VITAL SIGNS:  Blood pressure taken manually by this examiner is 142/76.  Other   vital signs normal.  HEENT:  Normocephalic.  NECK:  Supple, no masses, no thyromegaly.  LUNGS:  Clear to auscultation.  CARDIOVASCULAR:  Regular rate and rhythm, no significant murmur.  Carotids are   full bilaterally without bruits.  2+ pedal pulses.  No ischemic changes of the   lower extremities.  SKIN:  Feet, she has small several millimeter papules, some verrucous appearing   lesions about the  foot, ankle and distal lower extremities, scattered.    DATA:  Recent lab data noted and reviewed.  Glycohemoglobin from 04/12/2019 was   7.6.  BMP reasonable.    IMPRESSION:  1.  Hypertension, probably reasonably controlled.  2.  Diabetes mellitus, acceptable.  We will follow up closely.  3.  Dyslipidemia, on statin therapy.  4.  Resolved bronchopneumonia.  5.  Skin lesions, uncertain etiology.    PLAN:  1.  In three months, update lab data to include a complete metabolic profile,   glycohemoglobin, lipid profile, TSH, CBC, urinalysis and urine for   microalbumin/creatinine ratio.  2.  Referral to Dermatology.  3.  Follow up after lab data above.      PB/HN  dd: 04/26/2019 14:56:23 (CDT)  td: 04/26/2019 22:34:56 (CDT)  Doc ID   #[id number]  Job ID #[id number]    CC:         Diabetic foot exam:    Visual Inspection:  Normal -  Bilateral    Pedal Pulses:   Right: Present  Left: Present    Posterior tibialis:   Right:Present  Left: Present    pwb

## 2019-04-26 NOTE — PROCEDURES
Procedures       PROCEDURE NOTE:  Nasal endoscopy   Preprocedure diagnosis:  Chronic sinusitis  Postprocedure diangosis:  Same  Complications:  None  Blood Loss:  None    Procedure in detail:  After verbal consent was obtained, the patient's nasal cavity was anesthesized using topical 1%lidocaine and Neosynepherine.  A rigid 0 degree endoscope was placed in first the right, then the left nasal cavity.  The inferior and middle turbinates were examined, and found to be edematous bilaterally.  The middle meatus and maxillary antrum was also examined, and found to have thick crusting and polypoid mucosa/polyps are present bilaterally worse on the right middle meatus.  No masses seen.  The patient tolerated the procedure well and there were no complications.

## 2019-05-03 ENCOUNTER — INITIAL CONSULT (OUTPATIENT)
Dept: DERMATOLOGY | Facility: CLINIC | Age: 84
End: 2019-05-03
Payer: MEDICARE

## 2019-05-03 VITALS — BODY MASS INDEX: 22.27 KG/M2 | WEIGHT: 138 LBS

## 2019-05-03 DIAGNOSIS — L82.1 SEBORRHEIC KERATOSES: ICD-10-CM

## 2019-05-03 DIAGNOSIS — L81.7 SCHAMBERG'S CAPILLARITIS: Primary | ICD-10-CM

## 2019-05-03 DIAGNOSIS — L82.0 LICHENOID KERATOSIS: ICD-10-CM

## 2019-05-03 PROCEDURE — 99213 OFFICE O/P EST LOW 20 MIN: CPT | Mod: PBBFAC,PO | Performed by: DERMATOLOGY

## 2019-05-03 PROCEDURE — 99213 OFFICE O/P EST LOW 20 MIN: CPT | Mod: S$PBB,,, | Performed by: DERMATOLOGY

## 2019-05-03 PROCEDURE — 99999 PR PBB SHADOW E&M-EST. PATIENT-LVL III: CPT | Mod: PBBFAC,,, | Performed by: DERMATOLOGY

## 2019-05-03 PROCEDURE — 99999 PR PBB SHADOW E&M-EST. PATIENT-LVL III: ICD-10-PCS | Mod: PBBFAC,,, | Performed by: DERMATOLOGY

## 2019-05-03 PROCEDURE — 99213 PR OFFICE/OUTPT VISIT, EST, LEVL III, 20-29 MIN: ICD-10-PCS | Mod: S$PBB,,, | Performed by: DERMATOLOGY

## 2019-05-03 NOTE — LETTER
May 3, 2019      PAULA Casillas MD  [address]  [place] [address]           [place] [facility]  [address]  [place] [address]  Phone: [phone]  Fax: [phone]          Patient: Sussy Costa   MR Number: [id number]   YOB: 1935   Date of Visit: 5/3/2019       Dear Dr. PAULA Casillas:    Thank you for referring Sussy Costa to me for evaluation. Attached you will find relevant portions of my assessment and plan of care.    If you have questions, please do not hesitate to call me. I look forward to following Sussy Costa along with you.    Sincerely,    Isabel Browning MD    Enclosure  CC:  No Recipients    If you would like to receive this communication electronically, please contact [email or web address] or [phone] to request more information on [facility] [employer] access.    For providers and/or their staff who would like to refer a patient to [facility], please contact us through our one-stop-shop provider referral line, [facility], at [phone].    If you feel you have received this communication in error or would no longer like to receive these types of communications, please e-mail [email or web address]

## 2019-05-03 NOTE — PROGRESS NOTES
Subjective:       Patient ID:  Sussy Costa is a 83 y.o. female who presents for   Chief Complaint   Patient presents with    Rash     bilateral lower legs, feet, several weeks.    Skin Check     UBSE     Rash  - Initial  Affected locations: left lower leg, right lower leg, left foot and right foot  Signs / symptoms: itching  Severity: mild  Aggravated by: nothing  Relieving factors/Treatments tried: OTC hydrocortisone  Improvement on treatment: no relief        Review of Systems   Constitutional: Negative for fever, chills, weight loss, weight gain, fatigue, night sweats and malaise.   Skin: Positive for rash, daily sunscreen use, activity-related sunscreen use and wears hat.        Objective:    Physical Exam   Constitutional: She appears well-developed and well-nourished. No distress.   Neurological: She is alert and oriented to person, place, and time. She is not disoriented.   Psychiatric: She has a normal mood and affect.   Skin:   Areas Examined (abnormalities noted in diagram):   Head / Face Inspection Performed  Neck Inspection Performed  Chest / Axilla Inspection Performed  Back Inspection Performed  RUE Inspected  LUE Inspection Performed              Diagram Legend     Erythematous scaling macule/papule c/w actinic keratosis       Vascular papule c/w angioma      Pigmented verrucoid papule/plaque c/w seborrheic keratosis      Yellow umbilicated papule c/w sebaceous hyperplasia      Irregularly shaped tan macule c/w lentigo     1-2 mm smooth white papules consistent with Milia      Movable subcutaneous cyst with punctum c/w epidermal inclusion cyst      Subcutaneous movable cyst c/w pilar cyst      Firm pink to brown papule c/w dermatofibroma      Pedunculated fleshy papule(s) c/w skin tag(s)      Evenly pigmented macule c/w junctional nevus     Mildly variegated pigmented, slightly irregular-bordered macule c/w mildly atypical nevus      Flesh colored to evenly pigmented papule c/w intradermal  nevus       Pink pearly papule/plaque c/w basal cell carcinoma      Erythematous hyperkeratotic cursted plaque c/w SCC      Surgical scar with no sign of skin cancer recurrence      Open and closed comedones      Inflammatory papules and pustules      Verrucoid papule consistent consistent with wart     Erythematous eczematous patches and plaques     Dystrophic onycholytic nail with subungual debris c/w onychomycosis     Umbilicated papule    Erythematous-base heme-crusted tan verrucoid plaque consistent with inflamed seborrheic keratosis     Erythematous Silvery Scaling Plaque c/w Psoriasis     See annotation      Assessment / Plan:          Schamberg's capillaritis  Leg levation , support socks, no hot water    Seborrheic keratoses  reassurance      Lichenoid keratosis  reassurance               Follow up in about 1 year (around 5/3/2020).

## 2019-05-12 RX ORDER — ATORVASTATIN CALCIUM 20 MG/1
TABLET, FILM COATED ORAL
Qty: 90 TABLET | Refills: 0 | OUTPATIENT
Start: 2019-05-12

## 2019-05-13 ENCOUNTER — OFFICE VISIT (OUTPATIENT)
Dept: PODIATRY | Facility: CLINIC | Age: 84
End: 2019-05-13
Payer: MEDICARE

## 2019-05-13 VITALS
DIASTOLIC BLOOD PRESSURE: 80 MMHG | HEART RATE: 79 BPM | SYSTOLIC BLOOD PRESSURE: 170 MMHG | BODY MASS INDEX: 22.18 KG/M2 | WEIGHT: 138 LBS | HEIGHT: 66 IN

## 2019-05-13 DIAGNOSIS — M76.62 ACHILLES TENDINITIS OF LEFT LOWER EXTREMITY: Primary | ICD-10-CM

## 2019-05-13 PROCEDURE — 99214 OFFICE O/P EST MOD 30 MIN: CPT | Mod: PBBFAC | Performed by: PODIATRIST

## 2019-05-13 PROCEDURE — 99999 PR PBB SHADOW E&M-EST. PATIENT-LVL IV: CPT | Mod: PBBFAC,,, | Performed by: PODIATRIST

## 2019-05-13 PROCEDURE — 99213 PR OFFICE/OUTPT VISIT, EST, LEVL III, 20-29 MIN: ICD-10-PCS | Mod: S$PBB,,, | Performed by: PODIATRIST

## 2019-05-13 PROCEDURE — 99999 PR PBB SHADOW E&M-EST. PATIENT-LVL IV: ICD-10-PCS | Mod: PBBFAC,,, | Performed by: PODIATRIST

## 2019-05-13 PROCEDURE — 99213 OFFICE O/P EST LOW 20 MIN: CPT | Mod: S$PBB,,, | Performed by: PODIATRIST

## 2019-05-13 RX ORDER — METHYLPREDNISOLONE 4 MG/1
TABLET ORAL
Qty: 1 PACKAGE | Refills: 0 | Status: SHIPPED | OUTPATIENT
Start: 2019-05-13 | End: 2019-06-04

## 2019-05-14 ENCOUNTER — HOSPITAL ENCOUNTER (OUTPATIENT)
Dept: RADIOLOGY | Facility: HOSPITAL | Age: 84
Discharge: HOME | End: 2019-05-14
Attending: OTOLARYNGOLOGY
Payer: MEDICARE

## 2019-05-14 DIAGNOSIS — J32.8 OTHER CHRONIC SINUSITIS: ICD-10-CM

## 2019-05-14 DIAGNOSIS — J33.8 POLYP OF PARANASAL SINUS: ICD-10-CM

## 2019-05-14 PROCEDURE — 70486 CT MAXILLOFACIAL W/O DYE: CPT | Mod: TC

## 2019-05-14 PROCEDURE — 70486 CT MAXILLOFACIAL W/O DYE: CPT | Mod: 26,,, | Performed by: RADIOLOGY

## 2019-05-14 PROCEDURE — 70486 CT MEDTRONIC SINUSES WITHOUT: ICD-10-PCS | Mod: 26,,, | Performed by: RADIOLOGY

## 2019-05-14 NOTE — PROGRESS NOTES
Subjective:      Patient ID: Sussy Costa is a 83 y.o. female.    Chief Complaint: Heel Pain (left heel)    Pt presents today c/o pain in her left heel/ankle. Pt states that it is painful when she walks and when she wears shoes. Pt denies any trauma.     Review of Systems   Constitution: Negative for chills, fever and malaise/fatigue.   HENT: Negative for hearing loss.    Cardiovascular: Negative for claudication.   Respiratory: Negative for shortness of breath.    Skin: Negative for flushing and rash.   Musculoskeletal: Negative for joint pain and myalgias.   Neurological: Negative for loss of balance, numbness, paresthesias and sensory change.   Psychiatric/Behavioral: Negative for altered mental status.           Objective:      Physical Exam   Cardiovascular:   Pulses:       Dorsalis pedis pulses are 2+ on the right side, and 2+ on the left side.        Posterior tibial pulses are 2+ on the right side, and 2+ on the left side.   No edema noted to b/L LEs   Musculoskeletal:   Muscle strength is 5/5 in all groups bilaterally.  Non reducible equinus noted to left lower extremity  POP to insertion of the AT, left   Feet:   Right Foot:   Protective Sensation: 5 sites tested. 5 sites sensed.   Left Foot:   Protective Sensation: 5 sites tested. 5 sites sensed.   Neurological:   Intact gross sensation noted to b/L LEs   Skin:   Normal skin tugor noted.   No open lesion noted b/L  Skin temp is warm to warm from proximal to distal b/L.  Webspaces clean, dry, and intact  Nails x10 short             Assessment:       Encounter Diagnosis   Name Primary?    Achilles tendinitis of left lower extremity Yes         Plan:       Sussy was seen today for heel pain.    Diagnoses and all orders for this visit:    Achilles tendinitis of left lower extremity    Other orders  -     methylPREDNISolone (MEDROL DOSEPACK) 4 mg tablet; use as directed      I counseled the patient on her conditions, their implications and medical  management.      Pt was advised on the etiologies and issues associated with achilles tendinitis.  Heel lifts dispensed to pt to be worn in left shoe.   Medrol dose pack prescribed for inflammation, pt advised to use ice, elevation, and OTC NSAIDs for residual pain.   Pt will RTC in 4 weeks or sooner if any new pedal problems should arise.    .

## 2019-05-21 ENCOUNTER — OFFICE VISIT (OUTPATIENT)
Dept: OTOLARYNGOLOGY | Facility: CLINIC | Age: 84
End: 2019-05-21
Payer: MEDICARE

## 2019-05-21 ENCOUNTER — TELEPHONE (OUTPATIENT)
Dept: OTOLARYNGOLOGY | Facility: CLINIC | Age: 84
End: 2019-05-21

## 2019-05-21 VITALS
WEIGHT: 141.75 LBS | SYSTOLIC BLOOD PRESSURE: 173 MMHG | BODY MASS INDEX: 22.78 KG/M2 | HEART RATE: 74 BPM | DIASTOLIC BLOOD PRESSURE: 76 MMHG | HEIGHT: 66 IN

## 2019-05-21 DIAGNOSIS — J30.89 NON-SEASONAL ALLERGIC RHINITIS, UNSPECIFIED TRIGGER: ICD-10-CM

## 2019-05-21 DIAGNOSIS — J33.8 POLYP OF PARANASAL SINUS: ICD-10-CM

## 2019-05-21 DIAGNOSIS — J34.2 NASAL SEPTAL DEVIATION: ICD-10-CM

## 2019-05-21 DIAGNOSIS — J32.8 OTHER CHRONIC SINUSITIS: Primary | ICD-10-CM

## 2019-05-21 DIAGNOSIS — J34.3 HYPERTROPHY OF INFERIOR NASAL TURBINATE: ICD-10-CM

## 2019-05-21 PROCEDURE — 31231 PR NASAL ENDOSCOPY, DX: ICD-10-PCS | Mod: S$PBB,,, | Performed by: OTOLARYNGOLOGY

## 2019-05-21 PROCEDURE — 99214 PR OFFICE/OUTPT VISIT, EST, LEVL IV, 30-39 MIN: ICD-10-PCS | Mod: 25,S$PBB,, | Performed by: OTOLARYNGOLOGY

## 2019-05-21 PROCEDURE — 31231 NASAL ENDOSCOPY DX: CPT | Mod: S$PBB,,, | Performed by: OTOLARYNGOLOGY

## 2019-05-21 PROCEDURE — 31231 NASAL ENDOSCOPY DX: CPT | Mod: PBBFAC,PN | Performed by: OTOLARYNGOLOGY

## 2019-05-21 PROCEDURE — 99999 PR PBB SHADOW E&M-EST. PATIENT-LVL IV: ICD-10-PCS | Mod: PBBFAC,,, | Performed by: OTOLARYNGOLOGY

## 2019-05-21 PROCEDURE — 99999 PR PBB SHADOW E&M-EST. PATIENT-LVL IV: CPT | Mod: PBBFAC,,, | Performed by: OTOLARYNGOLOGY

## 2019-05-21 PROCEDURE — 99214 OFFICE O/P EST MOD 30 MIN: CPT | Mod: PBBFAC,PN | Performed by: OTOLARYNGOLOGY

## 2019-05-21 PROCEDURE — 99214 OFFICE O/P EST MOD 30 MIN: CPT | Mod: 25,S$PBB,, | Performed by: OTOLARYNGOLOGY

## 2019-05-21 NOTE — H&P (VIEW-ONLY)
Chief Complaint   Patient presents with    Follow-up     discuss CT results    Nasal Congestion     slight improvement   .    HPI:     Sussy Costa is a 83 y.o. female who is known to me from my previous practice with chronic sinusitis who presents today with right otalgia for 2 weeks. She reports that the pain in mild in nature and describes it as a sharp intermittent pain.  She states it is gradually worsening.  She notes right aural fullness with muffled hearing in the right ear only.  She reports that she is not having any nasal congestion, facial pain or pressure, rhinorrhea, or post-nasal drip at present.  She using nasal rinses.  She continues on Singulair, Omnaris.     Interval HPI 05/21/2019:  Mrs. Costa follows up today for chronic sinusitis with polyposis.  She states since last visit she completed her Cipro.  She remains on compounded nasal irrigations b.i.d. as directed.  She states that her symptoms are worse in the morning when she 1st wakes.  She notes increased nasal congestion, nasal crusting, and purulent rhinorrhea. She continues on Singulair as directed.  She notes that she has had increased allergy symptoms including sneezing and itchy watery eyes.     Past Medical History:   Diagnosis Date    Asthma     Cyst of pancreas     liver    Diabetes mellitus type II     Eczema of hand     Glaucoma     Hyperlipidemia     Hypertension     Lichen planus     gums    Osteoporosis     Pulmonary nodules      Social History     Socioeconomic History    Marital status:      Spouse name: sania    Number of children: 1    Years of education: Not on file    Highest education level: Not on file   Occupational History    Occupation:    Social Needs    Financial resource strain: Not hard at all    Food insecurity:     Worry: Not on file     Inability: Not on file    Transportation needs:     Medical: Not on file     Non-medical: Not on file   Tobacco Use    Smoking  status: Never Smoker    Smokeless tobacco: Never Used   Substance and Sexual Activity    Alcohol use: Yes     Comment: socially    Drug use: No    Sexual activity: Yes     Partners: Male     Birth control/protection: Post-menopausal   Lifestyle    Physical activity:     Days per week: 1 day     Minutes per session: 10 min    Stress: Only a little   Relationships    Social connections:     Talks on phone: Three times a week     Gets together: Twice a week     Attends Rastafarian service: Not on file     Active member of club or organization: No     Attends meetings of clubs or organizations: Not on file     Relationship status:    Other Topics Concern    Are you pregnant or think you may be? Not Asked    Breast-feeding Not Asked   Social History Narrative    She does not exercise regularly.     Past Surgical History:   Procedure Laterality Date    CATARACT EXTRACTION, BILATERAL      CHOLECYSTECTOMY      HYSTERECTOMY      INJECTION-JOINT Right 7/10/2014    [facility]    Injection-steroid-epidural-lumbar L5-S1 N/A 11/8/2018    Performed by Nicci Osorio Jr., [facility] PAIN [facility]    nasal polyps      ULTRASOUND-ENDOSCOPIC-UPPER N/A 3/18/2016    Performed by Max Rosado MD at [facility] ENDO (2ND FLR)     Family History   Problem Relation Age of Onset    Heart disease Father     Heart disease Brother     Hypertension Brother            Review of Systems  General: negative for chills, fever or weight loss  Psychological: negative for mood changes or depression  Ophthalmic: negative for blurry vision, photophobia or eye pain  ENT: see HPI  Respiratory: no cough, shortness of breath, or wheezing  Cardiovascular: no chest pain or dyspnea on exertion  Gastrointestinal: no abdominal pain, change in bowel habits, or black/ bloody stools  Musculoskeletal: negative for gait disturbance or muscular weakness  Neurological: no syncope or seizures; no  ataxia  Dermatological: negative for puritis,  rash and jaundice  Hematologic/lymphatic: no easy bruising, no new lumps or bumps      Physical Exam:    Vitals:    05/21/19 0935   BP: (!) 173/76   Pulse: 74       Constitutional: Well appearing / communicating without difficutly.  NAD.  Eyes: EOM I Bilaterally  Head/Face: Normocephalic.  Negative paranasal sinus pressure/tenderness.  Salivary glands WNL.  House Brackmann I Bilaterally.    Right Ear: Auricle normal appearance. External Auditory Canal within normal limits,TM:  effusion resolved. TM mobility limited.    Left Ear: Auricle normal appearance. External Auditory Canal WNL,TM retracted. TM mobility limited.  Nose: No gross nasal septal deviation. Inferior Turbinates edematous 3+ bilaterally. No septal perforation. No masses/lesions. External nasal skin appears normal without masses/lesions. Thick purulent crusts resolved.   Oral Cavity: Gingiva/lips within normal limits.  Dentition/gingiva healthy appearing. Mucus membranes moist. Floor of mouth soft, no masses palpated. Oral Tongue mobile. Hard Palate appears normal.    Oropharynx: Base of tongue appears normal. No masses/lesions noted. Tonsillar fossa/pharyngeal wall without lesions. Posterior oropharynx WNL.  Soft palate without masses. Midline uvula.   Neck/Lymphatic: No LAD I-VI bilaterally.  No thyromegaly.  No masses noted on exam.    Mirror laryngoscopy/nasopharyngoscopy: Active gag reflex.  Unable to perform.    Neuro/Psychiatric: AOx3.  Normal mood and affect.   Cardiovascular: Normal carotid pulses bilaterally, no increasing jugular venous distention noted at cervical region bilaterally.    Respiratory: Normal respiratory effort, no stridor, no retractions noted.    See separate procedure note for nasal endoscopy.      CT sinus 05/14/2019:Extensive postsurgical alteration of the paranasal sinuses with severe pansinus disease and associated findings of chronic sinusitis.  Polypoid soft tissue  thickening into the nasal cavity raises the question of underlying sinonasal polyposis.    Assessment:    ICD-10-CM ICD-9-CM    1. Other chronic sinusitis J32.8 473.8    2. Polyp of paranasal sinus J33.8 471.8    3. Non-seasonal allergic rhinitis, unspecified trigger J30.89 477.8      The primary encounter diagnosis was Other chronic sinusitis. Diagnoses of Polyp of paranasal sinus and Non-seasonal allergic rhinitis, unspecified trigger were also pertinent to this visit.      Plan:  CRS with polyposis: Increased crusting and polyps noted on endoscopy. Continue the daily use of compounded isotonic saline irrigation to treat her recalcitrant sinonasal inflammation.  She was counseled on the off-label nature of this therapy and she consented to its use. Continue Singulair.   CT scan of the sinuses confirms diffuse sinonasal polyposis.  I have discussed with the patient options of continued medical management versus surgical management including  revision endoscopic sinus surgery. She would benefit from revision endoscopic sinus surgery and septoplasty for the treatment of her condition.  This would include all sinuses and would be bilateral.  Inferior turbinate reduction would not be included.  I discussed the risks, benefits and alternatives to surgery with the patient, as well as the expected postoperative course. Informed consent was obtained.   I gave her the opportunity to ask questions and I answered all of them.  I provided relevant printed information on her condition for her to review at home.  Same-day discharge is anticipated.  She will need evaluation in the pre-anesthesia clinic.  She will obtain pre-operative clearance prior to surgery.  The surgery will be scheduled in the near future.    This visit was 25 minutes in duration, with over 50% of the time spent in direct face-to-face counseling and coordination of care regarding the issues outlined above.      Rosangela Isabel MD

## 2019-05-21 NOTE — PROGRESS NOTES
Chief Complaint   Patient presents with    Follow-up     discuss CT results    Nasal Congestion     slight improvement   .    HPI:     Sussy Costa is a 83 y.o. female who is known to me from my previous practice with chronic sinusitis who presents today with right otalgia for 2 weeks. She reports that the pain in mild in nature and describes it as a sharp intermittent pain.  She states it is gradually worsening.  She notes right aural fullness with muffled hearing in the right ear only.  She reports that she is not having any nasal congestion, facial pain or pressure, rhinorrhea, or post-nasal drip at present.  She using nasal rinses.  She continues on Singulair, Omnaris.     Interval HPI 05/21/2019:  Mrs. Costa follows up today for chronic sinusitis with polyposis.  She states since last visit she completed her Cipro.  She remains on compounded nasal irrigations b.i.d. as directed.  She states that her symptoms are worse in the morning when she 1st wakes.  She notes increased nasal congestion, nasal crusting, and purulent rhinorrhea. She continues on Singulair as directed.  She notes that she has had increased allergy symptoms including sneezing and itchy watery eyes.     Past Medical History:   Diagnosis Date    Asthma     Cyst of pancreas     liver    Diabetes mellitus type II     Eczema of hand     Glaucoma     Hyperlipidemia     Hypertension     Lichen planus     gums    Osteoporosis     Pulmonary nodules      Social History     Socioeconomic History    Marital status:      Spouse name: sania    Number of children: 1    Years of education: Not on file    Highest education level: Not on file   Occupational History    Occupation:    Social Needs    Financial resource strain: Not hard at all    Food insecurity:     Worry: Not on file     Inability: Not on file    Transportation needs:     Medical: Not on file     Non-medical: Not on file   Tobacco Use    Smoking  status: Never Smoker    Smokeless tobacco: Never Used   Substance and Sexual Activity    Alcohol use: Yes     Comment: socially    Drug use: No    Sexual activity: Yes     Partners: Male     Birth control/protection: Post-menopausal   Lifestyle    Physical activity:     Days per week: 1 day     Minutes per session: 10 min    Stress: Only a little   Relationships    Social connections:     Talks on phone: Three times a week     Gets together: Twice a week     Attends Nondenominational service: Not on file     Active member of club or organization: No     Attends meetings of clubs or organizations: Not on file     Relationship status:    Other Topics Concern    Are you pregnant or think you may be? Not Asked    Breast-feeding Not Asked   Social History Narrative    She does not exercise regularly.     Past Surgical History:   Procedure Laterality Date    CATARACT EXTRACTION, BILATERAL      CHOLECYSTECTOMY      HYSTERECTOMY      INJECTION-JOINT Right 7/10/2014    Performed [facility] PAIN [facility]GT    Injection-steroid-epidural-lumbar L5-S1 N/A 11/8/2018    Performed by [facility] at [facility] PAIN MGT    nasal polyps      ULTRASOUND-ENDOSCOPIC-UPPER N/A 3/18/2016    Performed by Max Rosado MD at [facility] ENDO (2ND FLR)     Family History   Problem Relation Age of Onset    Heart disease Father     Heart disease Brother     Hypertension Brother            Review of Systems  General: negative for chills, fever or weight loss  Psychological: negative for mood changes or depression  Ophthalmic: negative for blurry vision, photophobia or eye pain  ENT: see HPI  Respiratory: no cough, shortness of breath, or wheezing  Cardiovascular: no chest pain or dyspnea on exertion  Gastrointestinal: no abdominal pain, change in bowel habits, or black/ bloody stools  Musculoskeletal: negative for gait disturbance or muscular weakness  Neurological: no syncope or seizures; no  ataxia  Dermatological: negative for puritis,  rash and jaundice  Hematologic/lymphatic: no easy bruising, no new lumps or bumps      Physical Exam:    Vitals:    05/21/19 0935   BP: (!) 173/76   Pulse: 74       Constitutional: Well appearing / communicating without difficutly.  NAD.  Eyes: EOM I Bilaterally  Head/Face: Normocephalic.  Negative paranasal sinus pressure/tenderness.  Salivary glands WNL.  House Brackmann I Bilaterally.    Right Ear: Auricle normal appearance. External Auditory Canal within normal limits,TM:  effusion resolved. TM mobility limited.    Left Ear: Auricle normal appearance. External Auditory Canal WNL,TM retracted. TM mobility limited.  Nose: No gross nasal septal deviation. Inferior Turbinates edematous 3+ bilaterally. No septal perforation. No masses/lesions. External nasal skin appears normal without masses/lesions. Thick purulent crusts resolved.   Oral Cavity: Gingiva/lips within normal limits.  Dentition/gingiva healthy appearing. Mucus membranes moist. Floor of mouth soft, no masses palpated. Oral Tongue mobile. Hard Palate appears normal.    Oropharynx: Base of tongue appears normal. No masses/lesions noted. Tonsillar fossa/pharyngeal wall without lesions. Posterior oropharynx WNL.  Soft palate without masses. Midline uvula.   Neck/Lymphatic: No LAD I-VI bilaterally.  No thyromegaly.  No masses noted on exam.    Mirror laryngoscopy/nasopharyngoscopy: Active gag reflex.  Unable to perform.    Neuro/Psychiatric: AOx3.  Normal mood and affect.   Cardiovascular: Normal carotid pulses bilaterally, no increasing jugular venous distention noted at cervical region bilaterally.    Respiratory: Normal respiratory effort, no stridor, no retractions noted.    See separate procedure note for nasal endoscopy.      CT sinus 05/14/2019:Extensive postsurgical alteration of the paranasal sinuses with severe pansinus disease and associated findings of chronic sinusitis.  Polypoid soft tissue  thickening into the nasal cavity raises the question of underlying sinonasal polyposis.    Assessment:    ICD-10-CM ICD-9-CM    1. Other chronic sinusitis J32.8 473.8    2. Polyp of paranasal sinus J33.8 471.8    3. Non-seasonal allergic rhinitis, unspecified trigger J30.89 477.8      The primary encounter diagnosis was Other chronic sinusitis. Diagnoses of Polyp of paranasal sinus and Non-seasonal allergic rhinitis, unspecified trigger were also pertinent to this visit.      Plan:  CRS with polyposis: Increased crusting and polyps noted on endoscopy. Continue the daily use of compounded isotonic saline irrigation to treat her recalcitrant sinonasal inflammation.  She was counseled on the off-label nature of this therapy and she consented to its use. Continue Singulair.   CT scan of the sinuses confirms diffuse sinonasal polyposis.  I have discussed with the patient options of continued medical management versus surgical management including  revision endoscopic sinus surgery. She would benefit from revision endoscopic sinus surgery and septoplasty for the treatment of her condition.  This would include all sinuses and would be bilateral.  Inferior turbinate reduction would not be included.  I discussed the risks, benefits and alternatives to surgery with the patient, as well as the expected postoperative course. Informed consent was obtained.   I gave her the opportunity to ask questions and I answered all of them.  I provided relevant printed information on her condition for her to review at home.  Same-day discharge is anticipated.  She will need evaluation in the pre-anesthesia clinic.  She will obtain pre-operative clearance prior to surgery.  The surgery will be scheduled in the near future.    This visit was 25 minutes in duration, with over 50% of the time spent in direct face-to-face counseling and coordination of care regarding the issues outlined above.      Rosangela Isabel MD

## 2019-05-21 NOTE — PROCEDURES
Procedures       PROCEDURE NOTE:  Nasal endoscopy   Preprocedure diagnosis:  Chronic sinusitis  Postprocedure diangosis:  Same  Complications:  None  Blood Loss:  None    Procedure in detail:  After verbal consent was obtained, the patient's nasal cavity was anesthesized using topical 1%lidocaine and Neosynepherine.  A rigid 0 degree endoscope was placed in first the right, then the left nasal cavity.  The inferior and middle turbinates were examined, and found to be edematous bilaterally.  The middle meatus and maxillary antrum was also examined, and found to have thick crusting and brittany polyps are present bilaterally worse on the right middle meatus.  No masses seen.  The patient tolerated the procedure well and there were no complications.

## 2019-05-23 ENCOUNTER — TELEPHONE (OUTPATIENT)
Dept: INTERNAL MEDICINE | Facility: CLINIC | Age: 84
End: 2019-05-23

## 2019-05-23 RX ORDER — ATORVASTATIN CALCIUM 20 MG/1
TABLET, FILM COATED ORAL
Qty: 90 TABLET | Refills: 0 | Status: SHIPPED | OUTPATIENT
Start: 2019-05-23 | End: 2019-08-05 | Stop reason: SDUPTHER

## 2019-05-23 NOTE — TELEPHONE ENCOUNTER
----- Message from PAULA Casillas MD sent at 5/22/2019  5:28 PM CDT -----   Can bring her in at an approp[riate earlier date.  pwb  ----- Message -----  From: Giselle Carrero LPN  Sent: 5/21/2019   3:52 PM  To: PAULA Casillas MD    Patient is having sinus surgery on 6/17/19. She is needing cardiac clearance. Tried to schedule an appointment with Dr Casillas first available wasn't until July. Is there any way patient can be seen sooner?  Thank you!

## 2019-05-27 RX ORDER — MONTELUKAST SODIUM 10 MG/1
TABLET ORAL
Qty: 90 TABLET | Refills: 0 | Status: SHIPPED | OUTPATIENT
Start: 2019-05-27 | End: 2019-08-05 | Stop reason: SDUPTHER

## 2019-06-04 ENCOUNTER — OFFICE VISIT (OUTPATIENT)
Dept: INTERNAL MEDICINE | Facility: CLINIC | Age: 84
End: 2019-06-04
Payer: MEDICARE

## 2019-06-04 ENCOUNTER — LAB VISIT (OUTPATIENT)
Dept: LAB | Facility: HOSPITAL | Age: 84
End: 2019-06-04
Attending: INTERNAL MEDICINE
Payer: MEDICARE

## 2019-06-04 VITALS
WEIGHT: 140.44 LBS | HEART RATE: 76 BPM | DIASTOLIC BLOOD PRESSURE: 62 MMHG | BODY MASS INDEX: 22.57 KG/M2 | SYSTOLIC BLOOD PRESSURE: 146 MMHG | HEIGHT: 66 IN | TEMPERATURE: 99 F

## 2019-06-04 DIAGNOSIS — J33.9 NASAL POLYPS: ICD-10-CM

## 2019-06-04 DIAGNOSIS — Z01.818 PREOP EXAM FOR INTERNAL MEDICINE: ICD-10-CM

## 2019-06-04 DIAGNOSIS — I15.2 HYPERTENSION ASSOCIATED WITH DIABETES: ICD-10-CM

## 2019-06-04 DIAGNOSIS — E11.9 DIABETES MELLITUS WITHOUT COMPLICATION: ICD-10-CM

## 2019-06-04 DIAGNOSIS — E11.59 HYPERTENSION ASSOCIATED WITH DIABETES: ICD-10-CM

## 2019-06-04 DIAGNOSIS — Z01.818 PREOP EXAM FOR INTERNAL MEDICINE: Primary | ICD-10-CM

## 2019-06-04 DIAGNOSIS — J45.909 ASTHMA, UNSPECIFIED ASTHMA SEVERITY, UNSPECIFIED WHETHER COMPLICATED, UNSPECIFIED WHETHER PERSISTENT: ICD-10-CM

## 2019-06-04 LAB
ANION GAP SERPL CALC-SCNC: 5 MMOL/L (ref 8–16)
BASOPHILS # BLD AUTO: 0.06 K/UL (ref 0–0.2)
BASOPHILS NFR BLD: 0.6 % (ref 0–1.9)
BUN SERPL-MCNC: 17 MG/DL (ref 8–23)
CALCIUM SERPL-MCNC: 9.7 MG/DL (ref 8.7–10.5)
CHLORIDE SERPL-SCNC: 108 MMOL/L (ref 95–110)
CO2 SERPL-SCNC: 29 MMOL/L (ref 23–29)
CREAT SERPL-MCNC: 0.8 MG/DL (ref 0.5–1.4)
DIFFERENTIAL METHOD: ABNORMAL
EOSINOPHIL # BLD AUTO: 0.6 K/UL (ref 0–0.5)
EOSINOPHIL NFR BLD: 5.9 % (ref 0–8)
ERYTHROCYTE [DISTWIDTH] IN BLOOD BY AUTOMATED COUNT: 14.1 % (ref 11.5–14.5)
EST. GFR  (AFRICAN AMERICAN): >60 ML/MIN/1.73 M^2
EST. GFR  (NON AFRICAN AMERICAN): >60 ML/MIN/1.73 M^2
GLUCOSE SERPL-MCNC: 135 MG/DL (ref 70–110)
HCT VFR BLD AUTO: 40.4 % (ref 37–48.5)
HGB BLD-MCNC: 12.4 G/DL (ref 12–16)
IMM GRANULOCYTES # BLD AUTO: 0.04 K/UL (ref 0–0.04)
IMM GRANULOCYTES NFR BLD AUTO: 0.4 % (ref 0–0.5)
LYMPHOCYTES # BLD AUTO: 2.4 K/UL (ref 1–4.8)
LYMPHOCYTES NFR BLD: 22.9 % (ref 18–48)
MCH RBC QN AUTO: 29 PG (ref 27–31)
MCHC RBC AUTO-ENTMCNC: 30.7 G/DL (ref 32–36)
MCV RBC AUTO: 94 FL (ref 82–98)
MONOCYTES # BLD AUTO: 1.1 K/UL (ref 0.3–1)
MONOCYTES NFR BLD: 10.5 % (ref 4–15)
NEUTROPHILS # BLD AUTO: 6.3 K/UL (ref 1.8–7.7)
NEUTROPHILS NFR BLD: 59.7 % (ref 38–73)
NRBC BLD-RTO: 0 /100 WBC
PLATELET # BLD AUTO: 297 K/UL (ref 150–350)
PMV BLD AUTO: 11.5 FL (ref 9.2–12.9)
POTASSIUM SERPL-SCNC: 4.2 MMOL/L (ref 3.5–5.1)
RBC # BLD AUTO: 4.28 M/UL (ref 4–5.4)
SODIUM SERPL-SCNC: 142 MMOL/L (ref 136–145)
WBC # BLD AUTO: 10.48 K/UL (ref 3.9–12.7)

## 2019-06-04 PROCEDURE — 85025 COMPLETE CBC W/AUTO DIFF WBC: CPT

## 2019-06-04 PROCEDURE — 99214 PR OFFICE/OUTPT VISIT, EST, LEVL IV, 30-39 MIN: ICD-10-PCS | Mod: S$PBB,,, | Performed by: INTERNAL MEDICINE

## 2019-06-04 PROCEDURE — 99999 PR PBB SHADOW E&M-EST. PATIENT-LVL V: CPT | Mod: PBBFAC,,, | Performed by: INTERNAL MEDICINE

## 2019-06-04 PROCEDURE — 99214 OFFICE O/P EST MOD 30 MIN: CPT | Mod: S$PBB,,, | Performed by: INTERNAL MEDICINE

## 2019-06-04 PROCEDURE — 93005 ELECTROCARDIOGRAM TRACING: CPT | Mod: PBBFAC,PO | Performed by: INTERNAL MEDICINE

## 2019-06-04 PROCEDURE — 93010 ELECTROCARDIOGRAM REPORT: CPT | Mod: S$PBB,,, | Performed by: INTERNAL MEDICINE

## 2019-06-04 PROCEDURE — 99999 PR PBB SHADOW E&M-EST. PATIENT-LVL V: ICD-10-PCS | Mod: PBBFAC,,, | Performed by: INTERNAL MEDICINE

## 2019-06-04 PROCEDURE — 36415 COLL VENOUS BLD VENIPUNCTURE: CPT | Mod: PO

## 2019-06-04 PROCEDURE — 80048 BASIC METABOLIC PNL TOTAL CA: CPT

## 2019-06-04 PROCEDURE — 99215 OFFICE O/P EST HI 40 MIN: CPT | Mod: PBBFAC,25,PO | Performed by: INTERNAL MEDICINE

## 2019-06-04 PROCEDURE — 93010 EKG 12-LEAD: ICD-10-PCS | Mod: S$PBB,,, | Performed by: INTERNAL MEDICINE

## 2019-06-04 RX ORDER — CLINDAMYCIN HYDROCHLORIDE 300 MG/1
CAPSULE ORAL
COMMUNITY
Start: 2019-05-29 | End: 2019-06-06 | Stop reason: CLARIF

## 2019-06-04 RX ORDER — CHLORHEXIDINE GLUCONATE ORAL RINSE 1.2 MG/ML
SOLUTION DENTAL
Status: ON HOLD | COMMUNITY
Start: 2019-05-29 | End: 2020-09-30

## 2019-06-05 NOTE — PROGRESS NOTES
History of present illness:  83-year-old lady in today for a preoperative medical evaluation for planned nasal polyp excisions per Dr. Colin scheduled for June 17, 2019.  Anesthesia type is not certain.  The patient reports that she is currently doing well without chest pain palpitations syncope cough shortness of breath URI symptoms.  The patient has hypertension, diabetes, dyslipidemia, glaucoma, asthma.    Current medications:  Medications are all noted reviewed in our electronic medical record medication list and confirmed.    Review of systems:  Constitutional:  No fever no chills.  HEENT:  No hoarseness no dysphagia.  Recently had a benign cyst excision from her cheek and is finishing a course of clindamycin.  Respiratory:  No cough shortness of breath.  Asthma is controlled.  Cardiovascular:  Denies chest pain palpitations syncope or claudication.  GI:  No nausea vomiting abdominal pain or diarrhea.  :  No dysuria frequency change in the color or character of her urine.    Past medical history:  Hypertension  Diabetes mellitus.  Dyslipidemia.  Asthma.  Glaucoma.    Past surgical history, family medical history social history is are all noted and reviewed in our electronic medical record history sections.    Physical examination:  General:  Alert pleasant appropriately groomed lady no acute distress.  Vital signs:  Afebrile.  Blood pressure 144/62.  Other vital signs normal.  HEENT:  Neck is supple without masses no cervical adenopathy no thyromegaly no facial asymmetry.  Lungs:  Clear to auscultation normal to percussion.  Cardiovascular:  Regular rate and rhythm. No significant murmur.  Carotids full bilaterally bruits.  No JVD. No peripheral extremity edema or ischemic changes of lower extremities.  Mental status:  Alert oriented affect mood all appropriate.    Data:  Recent CBC, basic metabolic profile, glycohemoglobin ECG are noted and reviewed and reasonable.    Impression:  83-year-old lady with  several chronic medical conditions all of which are stable at this time including:  Hypertension.  Type 2 diabetes mellitus.  Dyslipidemia.  Asthma.  Glaucoma.    Recommendation:  She is considered a reasonable candidate to proceed with the planned nasal polyp excision surgery.  Diabetes mellitus

## 2019-06-06 ENCOUNTER — HOSPITAL ENCOUNTER (OUTPATIENT)
Dept: PREADMISSION TESTING | Facility: HOSPITAL | Age: 84
Discharge: HOME | End: 2019-06-06
Attending: OTOLARYNGOLOGY
Payer: MEDICARE

## 2019-06-06 ENCOUNTER — ANESTHESIA EVENT (OUTPATIENT)
Dept: SURGERY | Facility: HOSPITAL | Age: 84
End: 2019-06-06
Payer: MEDICARE

## 2019-06-06 RX ORDER — SODIUM CHLORIDE, SODIUM LACTATE, POTASSIUM CHLORIDE, CALCIUM CHLORIDE 600; 310; 30; 20 MG/100ML; MG/100ML; MG/100ML; MG/100ML
INJECTION, SOLUTION INTRAVENOUS CONTINUOUS
Status: CANCELLED | OUTPATIENT
Start: 2019-06-06

## 2019-06-06 RX ORDER — LIDOCAINE HYDROCHLORIDE 10 MG/ML
1 INJECTION, SOLUTION EPIDURAL; INFILTRATION; INTRACAUDAL; PERINEURAL ONCE
Status: CANCELLED | OUTPATIENT
Start: 2019-06-06 | End: 2019-06-06

## 2019-06-06 NOTE — DISCHARGE INSTRUCTIONS
Your surgery is scheduled for 6/17/19.    Please report to Outpatient Surgery Intake Office on the 2nd FLOOR at 7:45 a.m.          INSTRUCTIONS IMPORTANT!!!  ¨ Do not eat or drink after 12 midnight-including water. OK to brush teeth, no   gum, candy or mints!    ¨ Take only these medicines with a small swallow of water-morning of surgery: nebulizer treatment             ____  Do not wear makeup, including mascara.  ____  No powder, lotions or creams to surgical area.  ____  Please remove all jewelry, including piercings and leave at home.  ____  No money or valuables needed. Please leave at home.  ____  Please bring any documents given by your doctor.  ____  If going home the same day, arrange for a ride home. You will not be able to             drive if Anesthesia was used.  ____  Wear loose fitting clothing. Allow for dressings, bandages.  ____  Stop Aspirin, Ibuprofen, Motrin and Aleve at least 3-5 days before surgery, unless otherwise instructed by your doctor, or the nurse.   You MAY use Tylenol/acetaminophen until day of surgery.  ____  If you take diabetic medication, do not take am of surgery unless instructed by Doctor.  ____  Call MD for temperature above 101 degrees or any other signs of infection such as Urinary (bladder) infection, Upper respiratory infection, skin boils, etc.  ____ Stop taking any Fish Oil supplement or any Vitamins that contain Vitamin E at least 5 days prior to surgery.  ____ Do Not wear your contact lenses the day of your procedure.  You may wear your glasses.      ____Do not shave surgical site for 3 days prior to surgery.  ____ Practice Good hand washing before, during, and after procedure.      I have read or had read and explained to me, and understand the above information.  Additional comments or instructions:  For additional questions call [phone]      ANESTHESIA SIDE EFFECTS  -For the first 24 hours after surgery:  Do not drive, use heavy equipment, make important  decisions, or drink alcohol  -It is normal to feel sleepy for several hours.  Rest until you are more awake.  -Have someone stay with you, if needed.  They can watch for problems and help keep you safe.  -Some possible post anesthesia side effects include: nausea and vomiting, sore throat and hoarseness, sleepiness, and dizziness.          Endoscopic Sinus Surgery  The sinuses are hollow areas formed by the bones of the face. Normally, a thin layer of mucus drains from the sinuses into the nose. If the drainage path is blocked, problems such as infection can result. Endoscopic sinus surgery can be done to help clear blockages. The surgeon uses a thin, lighted tube (endoscope) that is put into your nose. The tube lets the doctor see and operate inside your nose and sinuses.     Straightening the septum       Removing polyps         Opening the ethmoid sinuses       Clearing the outflow pathway      Straightening the septum  The septum is a piece of cartilage and bone that runs straight down the inside of the nose. It divides the nose into two sides.  A deviated septum is crooked instead of straight. A crooked septum can cause breathing problems. To fix a deviated septum, the doctor reshapes or trims the cartilage and bone. There is enough septum left for the nose to hold its shape. But the air has more space to move in and out of the nose. This improves your breathing.  Removing polyps  Polyps are small growths. They can grow in both the nose and sinuses. The surgeon may use different methods to remove them. Often, the surgeon uses special tools to remove polyps without harming nearby tissues.  Opening the ethmoid sinuses  The ethmoid sinuses are made up of many small air spaces, like a honeycomb. Like the other sinuses, the ethmoids have a lining that makes mucus. In some cases the drainage path is blocked. The doctor may open the thin walls of bone that separate the air spaces. This creates a passage for mucus to  drain more easily.  Clearing the major outflow pathway of the sinuses  The osteomeatal complex is a term for a major outflow tract of your sinuses. Similar to a traffic jam, when this area becomes blocked, you may get symptoms in your maxillary, ethmoid, and frontal sinuses. Opening this area is a primary step in most sinus surgeries. The uncinate process is a small piece of bone and tissue in the sinuses. It forms an outlet for part of the sinuses. If this tissue is swollen (inflamed), it will block drainage of mucus. The doctor may remove the uncinate process so that mucus can drain.  Date Last Reviewed: 10/1/2016  © 2485-4419 [employer]. [address] [place] [address], [place] [address]. All rights reserved. This information is not intended as a substitute for professional medical care. Always follow your healthcare professional's instructions.

## 2019-06-06 NOTE — ANESTHESIA PREPROCEDURE EVALUATION
06/06/2019  Sussy Costa is a 83 y.o., female scheduled for FESS and septoplasty on 6/17/19.    PCP optimization in [facility].    Past Medical History:   Diagnosis Date    Asthma     Cyst of pancreas     liver    Diabetes mellitus type II     Eczema of hand     Glaucoma     Hyperlipidemia     Hypertension     Lichen planus     gums    Osteoporosis     Pulmonary nodules      Past Surgical History:   Procedure Laterality Date    CATARACT EXTRACTION, BILATERAL      CHOLECYSTECTOMY      HYSTERECTOMY      INJECTION-JOINT Right 7/10/2014    Performed by [facility] at [facility] PAIN MGT    Injection-steroid-epidural-lumbar L5-S1 N/A 11/8/2018    Performed by Nicci [facility]sorio Jr., [facility] at [facility] PAIN MGT    nasal polyps      ULTRASOUND-ENDOSCOPIC-UPPER N/A 3/18/2016    Performed by Max Rosado MD at [facility] ENDO ([address] [place] [address])       Anesthesia Evaluation         Review of Systems  Anesthesia Hx:  No problems with previous Anesthesia   Social:  Non-Smoker, Social Alcohol Use    Hematology/Oncology:  Hematology Normal        Cardiovascular:   Hypertension, well controlled  Denies Angina.     ECG has been reviewed.  Valvular Heart Disease: Aortic Stenosis (AS) (trivial) , JOSEPHINE(cm2) = 1.86. Mitral Stenosis (MS) (trivial ), Mitral Regurgitation (MR), mild, Pulmonic Regurgitation (MO), mild, Tricuspid Regurgitation (TR), mild     Pulmonary:   Denies Shortness of breath.  Asthma: Emergency visits this year is none.  Inhaler use is inhaled steroid use currently and rescue inhaler PRN. Current breathing status is optimal, free of wheezing.    Renal/:  Renal/ Normal     Hepatic/GI:  Hepatic/GI Normal    Musculoskeletal:   Arthritis     Neurological:  Neurology Normal    Endocrine:   Diabetes, well controlled, type 2        Physical Exam  General:  Well nourished    Airway/Jaw/Neck:  Airway Findings: Mouth  Opening: Normal Tongue: Normal  General Airway Assessment: Adult  Mallampati: IV  Improves to III with phonation.  TM Distance: Normal, at least 6 cm      Dental:  DENTAL FINDINGS: Normal   Chest/Lungs:  Chest/Lungs Findings: Clear to auscultation, Normal Respiratory Rate     Heart/Vascular:  Heart Findings: Rate: Normal  Rhythm: Regular Rhythm  Sounds: Normal  Heart murmur: negative       Mental Status:  Mental Status Findings:  Cooperative, Alert and Oriented       EKG 6/4/19  Normal sinus rhythm  Incomplete right bundle branch block  Otherwise normal ECG    Echo 4/2/18  CONCLUSIONS     1 - Normal left ventricular systolic function (EF 60-65%).     2 - Concentric remodeling.     3 - No wall motion abnormalities.     4 - Normal right ventricular systolic function .     5 - Trivial aortic stenosis, JOSEPHINE = 1.86 cm2, AVAi = 1.11 cm2/m2, peak velocity = 1.6 m/s, mean gradient = 6 mmHg.     6 - The mitral valve is moderately sclerotic with mildly restricted leaflet mobility.     7 - Trivial mitral stenosis, MVA = 3.3 cm2.     8 - Trivial to mild mitral regurgitation.     9 - Trivial to mild tricuspid regurgitation.     10 - Trivial pulmonic regurgitation.     11 - The estimated PA systolic pressure is 39 mmHg.       Anesthesia Plan  Type of Anesthesia, risks & benefits discussed:  Anesthesia Type:  general  Patient's Preference:   Intra-op Monitoring Plan:   Intra-op Monitoring Plan Comments:   Post Op Pain Control Plan:   Post Op Pain Control Plan Comments:   Induction:   IV  Beta Blocker:         Informed Consent:    ASA Score: 3     Day of Surgery Review of History & Physical:        Anesthesia Plan Notes: Anesthesia consent to be signed prior to procedure on 6/17/19  PCP optimization in Epic.           Ready For Surgery From Anesthesia Perspective.

## 2019-06-17 ENCOUNTER — ANESTHESIA (OUTPATIENT)
Dept: SURGERY | Facility: HOSPITAL | Age: 84
End: 2019-06-17
Payer: MEDICARE

## 2019-06-17 ENCOUNTER — HOSPITAL ENCOUNTER (OUTPATIENT)
Facility: HOSPITAL | Age: 84
Discharge: HOME | End: 2019-06-17
Attending: OTOLARYNGOLOGY | Admitting: OTOLARYNGOLOGY
Payer: MEDICARE

## 2019-06-17 DIAGNOSIS — J32.8 OTHER CHRONIC SINUSITIS: ICD-10-CM

## 2019-06-17 LAB — POCT GLUCOSE: 139 MG/DL (ref 70–110)

## 2019-06-17 PROCEDURE — 36000710: Performed by: OTOLARYNGOLOGY

## 2019-06-17 PROCEDURE — 27201423 OPTIME MED/SURG SUP & DEVICES STERILE SUPPLY: Performed by: OTOLARYNGOLOGY

## 2019-06-17 PROCEDURE — 71000015 HC POSTOP RECOV 1ST HR: Performed by: OTOLARYNGOLOGY

## 2019-06-17 PROCEDURE — 25000003 PHARM REV CODE 250: Performed by: OTOLARYNGOLOGY

## 2019-06-17 PROCEDURE — 94640 AIRWAY INHALATION TREATMENT: CPT

## 2019-06-17 PROCEDURE — 25000242 PHARM REV CODE 250 ALT 637 W/ HCPCS: Performed by: OTOLARYNGOLOGY

## 2019-06-17 PROCEDURE — 25000003 PHARM REV CODE 250: Performed by: NURSE ANESTHETIST, CERTIFIED REGISTERED

## 2019-06-17 PROCEDURE — 63600175 PHARM REV CODE 636 W HCPCS: Performed by: ANESTHESIOLOGY

## 2019-06-17 PROCEDURE — 37000008 HC ANESTHESIA 1ST 15 MINUTES: Performed by: OTOLARYNGOLOGY

## 2019-06-17 PROCEDURE — 63600175 PHARM REV CODE 636 W HCPCS: Performed by: NURSE ANESTHETIST, CERTIFIED REGISTERED

## 2019-06-17 PROCEDURE — 25000003 PHARM REV CODE 250: Performed by: NURSE PRACTITIONER

## 2019-06-17 PROCEDURE — 61782 SCAN PROC CRANIAL EXTRA: CPT | Mod: ,,, | Performed by: OTOLARYNGOLOGY

## 2019-06-17 PROCEDURE — 71000039 HC RECOVERY, EACH ADD'L HOUR: Performed by: OTOLARYNGOLOGY

## 2019-06-17 PROCEDURE — 88305 TISSUE SPECIMEN TO PATHOLOGY - SURGERY: ICD-10-PCS | Mod: 26,,, | Performed by: PATHOLOGY

## 2019-06-17 PROCEDURE — 31267 PR NASAL/SINUS ENDOSCOPY,RMV TISS MAXILL SINUS: ICD-10-PCS | Mod: 50,51,, | Performed by: OTOLARYNGOLOGY

## 2019-06-17 PROCEDURE — 31259 PR ENDOSCOPY, NASAL/SINUS, W/ETHMOIDECTOMY/SPHENOIDOTOMY/TISS REMVL: ICD-10-PCS | Mod: 50,,, | Performed by: OTOLARYNGOLOGY

## 2019-06-17 PROCEDURE — 31267 ENDOSCOPY MAXILLARY SINUS: CPT | Mod: 50,51,, | Performed by: OTOLARYNGOLOGY

## 2019-06-17 PROCEDURE — 88305 TISSUE EXAM BY PATHOLOGIST: CPT | Mod: 26,,, | Performed by: PATHOLOGY

## 2019-06-17 PROCEDURE — 37000009 HC ANESTHESIA EA ADD 15 MINS: Performed by: OTOLARYNGOLOGY

## 2019-06-17 PROCEDURE — 25000003 PHARM REV CODE 250: Performed by: ANESTHESIOLOGY

## 2019-06-17 PROCEDURE — 71000016 HC POSTOP RECOV ADDL HR: Performed by: OTOLARYNGOLOGY

## 2019-06-17 PROCEDURE — 71000033 HC RECOVERY, INTIAL HOUR: Performed by: OTOLARYNGOLOGY

## 2019-06-17 PROCEDURE — 31259 NSL/SINS NDSC SPHN TISS RMVL: CPT | Mod: 50,,, | Performed by: OTOLARYNGOLOGY

## 2019-06-17 PROCEDURE — 36000711: Performed by: OTOLARYNGOLOGY

## 2019-06-17 PROCEDURE — 61782 PR STEREOTACTIC COMP ASSIST PROC,CRANIAL,EXTRADURAL: ICD-10-PCS | Mod: ,,, | Performed by: OTOLARYNGOLOGY

## 2019-06-17 PROCEDURE — S0077 INJECTION, CLINDAMYCIN PHOSP: HCPCS | Performed by: OTOLARYNGOLOGY

## 2019-06-17 PROCEDURE — 88305 TISSUE EXAM BY PATHOLOGIST: CPT | Performed by: PATHOLOGY

## 2019-06-17 RX ORDER — LIDOCAINE HYDROCHLORIDE 10 MG/ML
1 INJECTION, SOLUTION EPIDURAL; INFILTRATION; INTRACAUDAL; PERINEURAL ONCE
Status: DISCONTINUED | OUTPATIENT
Start: 2019-06-17 | End: 2019-06-17 | Stop reason: HOSPADM

## 2019-06-17 RX ORDER — ROCURONIUM BROMIDE 10 MG/ML
INJECTION, SOLUTION INTRAVENOUS
Status: DISCONTINUED | OUTPATIENT
Start: 2019-06-17 | End: 2019-06-17

## 2019-06-17 RX ORDER — NEOSTIGMINE METHYLSULFATE 1 MG/ML
INJECTION, SOLUTION INTRAVENOUS
Status: DISCONTINUED | OUTPATIENT
Start: 2019-06-17 | End: 2019-06-17

## 2019-06-17 RX ORDER — SUCCINYLCHOLINE CHLORIDE 20 MG/ML
INJECTION INTRAMUSCULAR; INTRAVENOUS
Status: DISCONTINUED | OUTPATIENT
Start: 2019-06-17 | End: 2019-06-17

## 2019-06-17 RX ORDER — FENTANYL CITRATE 50 UG/ML
INJECTION, SOLUTION INTRAMUSCULAR; INTRAVENOUS
Status: DISCONTINUED | OUTPATIENT
Start: 2019-06-17 | End: 2019-06-17

## 2019-06-17 RX ORDER — ONDANSETRON 8 MG/1
8 TABLET, ORALLY DISINTEGRATING ORAL ONCE
Status: DISCONTINUED | OUTPATIENT
Start: 2019-06-17 | End: 2019-06-17 | Stop reason: HOSPADM

## 2019-06-17 RX ORDER — OXYMETAZOLINE HCL 0.05 %
SPRAY, NON-AEROSOL (ML) NASAL
Status: DISCONTINUED | OUTPATIENT
Start: 2019-06-17 | End: 2019-06-17 | Stop reason: HOSPADM

## 2019-06-17 RX ORDER — MEPERIDINE HYDROCHLORIDE 50 MG/ML
12.5 INJECTION INTRAMUSCULAR; INTRAVENOUS; SUBCUTANEOUS ONCE AS NEEDED
Status: DISCONTINUED | OUTPATIENT
Start: 2019-06-17 | End: 2019-06-17 | Stop reason: HOSPADM

## 2019-06-17 RX ORDER — PHENYLEPHRINE HYDROCHLORIDE 10 MG/ML
INJECTION INTRAVENOUS
Status: DISCONTINUED | OUTPATIENT
Start: 2019-06-17 | End: 2019-06-17

## 2019-06-17 RX ORDER — DEXAMETHASONE SODIUM PHOSPHATE 4 MG/ML
INJECTION, SOLUTION INTRA-ARTICULAR; INTRALESIONAL; INTRAMUSCULAR; INTRAVENOUS; SOFT TISSUE
Status: DISCONTINUED | OUTPATIENT
Start: 2019-06-17 | End: 2019-06-17

## 2019-06-17 RX ORDER — CLINDAMYCIN PHOSPHATE 900 MG/50ML
900 INJECTION, SOLUTION INTRAVENOUS
Status: COMPLETED | OUTPATIENT
Start: 2019-06-17 | End: 2019-06-17

## 2019-06-17 RX ORDER — PROPOFOL 10 MG/ML
VIAL (ML) INTRAVENOUS
Status: DISCONTINUED | OUTPATIENT
Start: 2019-06-17 | End: 2019-06-17

## 2019-06-17 RX ORDER — LIDOCAINE HYDROCHLORIDE 10 MG/ML
1 INJECTION, SOLUTION EPIDURAL; INFILTRATION; INTRACAUDAL; PERINEURAL ONCE
Status: DISCONTINUED | OUTPATIENT
Start: 2019-06-17 | End: 2019-06-17 | Stop reason: SDUPTHER

## 2019-06-17 RX ORDER — GLYCOPYRROLATE 0.2 MG/ML
INJECTION INTRAMUSCULAR; INTRAVENOUS
Status: DISCONTINUED | OUTPATIENT
Start: 2019-06-17 | End: 2019-06-17

## 2019-06-17 RX ORDER — HYDROCODONE BITARTRATE AND ACETAMINOPHEN 10; 325 MG/1; MG/1
1 TABLET ORAL EVERY 4 HOURS PRN
Status: DISCONTINUED | OUTPATIENT
Start: 2019-06-17 | End: 2019-06-17 | Stop reason: HOSPADM

## 2019-06-17 RX ORDER — SODIUM CHLORIDE 0.9 % (FLUSH) 0.9 %
3 SYRINGE (ML) INJECTION
Status: DISCONTINUED | OUTPATIENT
Start: 2019-06-17 | End: 2019-06-17 | Stop reason: HOSPADM

## 2019-06-17 RX ORDER — MUPIROCIN 20 MG/G
OINTMENT TOPICAL
Status: DISCONTINUED | OUTPATIENT
Start: 2019-06-17 | End: 2019-06-17 | Stop reason: HOSPADM

## 2019-06-17 RX ORDER — OXYMETAZOLINE HCL 0.05 %
2 SPRAY, NON-AEROSOL (ML) NASAL ONCE
Status: COMPLETED | OUTPATIENT
Start: 2019-06-17 | End: 2019-06-17

## 2019-06-17 RX ORDER — HYDROMORPHONE HYDROCHLORIDE 2 MG/ML
0.5 INJECTION, SOLUTION INTRAMUSCULAR; INTRAVENOUS; SUBCUTANEOUS EVERY 5 MIN PRN
Status: DISCONTINUED | OUTPATIENT
Start: 2019-06-17 | End: 2019-06-17 | Stop reason: HOSPADM

## 2019-06-17 RX ORDER — SULFAMETHOXAZOLE AND TRIMETHOPRIM 800; 160 MG/1; MG/1
1 TABLET ORAL 2 TIMES DAILY
Qty: 20 TABLET | Refills: 0 | Status: SHIPPED | OUTPATIENT
Start: 2019-06-17 | End: 2019-06-27

## 2019-06-17 RX ORDER — SODIUM CHLORIDE, SODIUM LACTATE, POTASSIUM CHLORIDE, CALCIUM CHLORIDE 600; 310; 30; 20 MG/100ML; MG/100ML; MG/100ML; MG/100ML
INJECTION, SOLUTION INTRAVENOUS CONTINUOUS
Status: DISCONTINUED | OUTPATIENT
Start: 2019-06-17 | End: 2019-06-17 | Stop reason: HOSPADM

## 2019-06-17 RX ORDER — HYDROCODONE BITARTRATE AND ACETAMINOPHEN 5; 325 MG/1; MG/1
1 TABLET ORAL EVERY 4 HOURS PRN
Status: DISCONTINUED | OUTPATIENT
Start: 2019-06-17 | End: 2019-06-17 | Stop reason: HOSPADM

## 2019-06-17 RX ORDER — ONDANSETRON HYDROCHLORIDE 2 MG/ML
INJECTION, SOLUTION INTRAMUSCULAR; INTRAVENOUS
Status: DISCONTINUED | OUTPATIENT
Start: 2019-06-17 | End: 2019-06-17

## 2019-06-17 RX ORDER — ONDANSETRON 2 MG/ML
4 INJECTION INTRAMUSCULAR; INTRAVENOUS DAILY PRN
Status: DISCONTINUED | OUTPATIENT
Start: 2019-06-17 | End: 2019-06-17 | Stop reason: HOSPADM

## 2019-06-17 RX ORDER — LIDOCAINE HYDROCHLORIDE AND EPINEPHRINE 10; 10 MG/ML; UG/ML
INJECTION, SOLUTION INFILTRATION; PERINEURAL
Status: DISCONTINUED | OUTPATIENT
Start: 2019-06-17 | End: 2019-06-17 | Stop reason: HOSPADM

## 2019-06-17 RX ORDER — ALBUTEROL SULFATE 2.5 MG/.5ML
2.5 SOLUTION RESPIRATORY (INHALATION) ONCE
Status: COMPLETED | OUTPATIENT
Start: 2019-06-17 | End: 2019-06-17

## 2019-06-17 RX ORDER — LIDOCAINE HCL/PF 100 MG/5ML
SYRINGE (ML) INTRAVENOUS
Status: DISCONTINUED | OUTPATIENT
Start: 2019-06-17 | End: 2019-06-17

## 2019-06-17 RX ORDER — HYDROCODONE BITARTRATE AND ACETAMINOPHEN 7.5; 325 MG/1; MG/1
1 TABLET ORAL EVERY 6 HOURS PRN
Qty: 28 TABLET | Refills: 0 | Status: SHIPPED | OUTPATIENT
Start: 2019-06-17 | End: 2019-06-27

## 2019-06-17 RX ORDER — OXYMETAZOLINE HCL 0.05 %
2 SPRAY, NON-AEROSOL (ML) NASAL ONCE
Status: DISCONTINUED | OUTPATIENT
Start: 2019-06-17 | End: 2019-06-17

## 2019-06-17 RX ADMIN — ROCURONIUM BROMIDE 5 MG: 10 INJECTION, SOLUTION INTRAVENOUS at 11:06

## 2019-06-17 RX ADMIN — PHENYLEPHRINE HYDROCHLORIDE 100 MCG: 10 INJECTION INTRAVENOUS at 12:06

## 2019-06-17 RX ADMIN — ROCURONIUM BROMIDE 15 MG: 10 INJECTION, SOLUTION INTRAVENOUS at 11:06

## 2019-06-17 RX ADMIN — FENTANYL CITRATE 50 MCG: 50 INJECTION, SOLUTION INTRAMUSCULAR; INTRAVENOUS at 11:06

## 2019-06-17 RX ADMIN — OXYMETAZOLINE HCL 2 SPRAY: 0.05 SPRAY NASAL at 09:06

## 2019-06-17 RX ADMIN — PROPOFOL 80 MG: 10 INJECTION, EMULSION INTRAVENOUS at 11:06

## 2019-06-17 RX ADMIN — DEXAMETHASONE SODIUM PHOSPHATE 8 MG: 4 INJECTION, SOLUTION INTRAMUSCULAR; INTRAVENOUS at 12:06

## 2019-06-17 RX ADMIN — ONDANSETRON 8 MG: 2 INJECTION, SOLUTION INTRAMUSCULAR; INTRAVENOUS at 12:06

## 2019-06-17 RX ADMIN — PROMETHAZINE HYDROCHLORIDE 6.25 MG: 25 INJECTION INTRAMUSCULAR; INTRAVENOUS at 03:06

## 2019-06-17 RX ADMIN — LIDOCAINE HYDROCHLORIDE 80 MG: 20 INJECTION, SOLUTION INTRAVENOUS at 11:06

## 2019-06-17 RX ADMIN — NEOSTIGMINE METHYLSULFATE 4 MG: 1 INJECTION INTRAVENOUS at 01:06

## 2019-06-17 RX ADMIN — SUCCINYLCHOLINE CHLORIDE 100 MG: 20 INJECTION, SOLUTION INTRAMUSCULAR; INTRAVENOUS at 11:06

## 2019-06-17 RX ADMIN — ALBUTEROL SULFATE 2.5 MG: 2.5 SOLUTION RESPIRATORY (INHALATION) at 01:06

## 2019-06-17 RX ADMIN — GLYCOPYRROLATE 0.6 MG: 0.2 INJECTION, SOLUTION INTRAMUSCULAR; INTRAVENOUS at 01:06

## 2019-06-17 RX ADMIN — PHENYLEPHRINE HYDROCHLORIDE 100 MCG: 10 INJECTION INTRAVENOUS at 11:06

## 2019-06-17 RX ADMIN — SODIUM CHLORIDE, SODIUM LACTATE, POTASSIUM CHLORIDE, AND CALCIUM CHLORIDE: .6; .31; .03; .02 INJECTION, SOLUTION INTRAVENOUS at 09:06

## 2019-06-17 RX ADMIN — CLINDAMYCIN PHOSPHATE 900 MG: 18 INJECTION, SOLUTION INTRAVENOUS at 11:06

## 2019-06-17 NOTE — DISCHARGE SUMMARY
Discharge Note    SUMMARY     Admit Date: 6/17/2019    Discharge Date and Time:  6/17/2019    Attending Physician: Rosangela Isabel,*     Discharge Provider: Rosangela Isabel    Final Diagnosis: Post-Op Diagnosis Codes:     * Other chronic sinusitis [J32.8]     * Polyp of paranasal sinus [J33.8]     * Nasal septal deviation [J34.2]     * Hypertrophy of inferior nasal turbinate [J34.3]    Disposition: Home or Self Care    Follow Up/Patient Instructions: Dr. Colin 1 week.     Medications:  Reconciled Home Medications:   Current Discharge Medication List      START taking these medications    Details   HYDROcodone-acetaminophen (NORCO) 7.5-325 mg per tablet Take 1 tablet by mouth every 6 (six) hours as needed for Pain.  Qty: 28 tablet, Refills: 0      sulfamethoxazole-trimethoprim 800-160mg (BACTRIM DS) 800-160 mg Tab Take 1 tablet by mouth 2 (two) times daily. for 10 days  Qty: 20 tablet, Refills: 0         CONTINUE these medications which have NOT CHANGED    Details   albuterol (ACCUNEB) 1.25 mg/3 mL Nebu Take 3 mLs (1.25 mg total) by nebulization every 6 (six) hours as needed. Rescue  Qty: 1 Box, Refills: 0    Associated Diagnoses: Pneumonia due to infectious organism, unspecified laterality, unspecified part of lung      ascorbic acid (VITAMIN C) 100 MG tablet Take 100 mg by mouth once daily.      atorvastatin (LIPITOR) 20 MG tablet TAKE 1 TABLET BY MOUTH DAILY IN THE EVENING FOR CHOLESTEROL  Qty: 90 tablet, Refills: 0      glimepiride (AMARYL) 2 MG tablet TAKE ONE TABLET BY MOUTH IN THE MORNING WITH BREAKFAST  Qty: 90 tablet, Refills: 3      irbesartan (AVAPRO) 300 MG tablet Take 1 tablet (300 mg total) by mouth every evening.  Qty: 90 tablet, Refills: 3      latanoprost (XALATAN) 0.005 % ophthalmic solution Inject into the eye. 1 Drops Ophthalmic Every evening      levalbuterol (XOPENEX) 0.63 mg/3 mL nebulizer solution Take 3 mLs (0.63 mg total) by nebulization 3 (three) times daily as needed  for Wheezing.  Qty: 36 mL, Refills: 6      levocetirizine (XYZAL) 5 MG tablet Take 1 tablet (5 mg total) by mouth every evening.  Qty: 30 tablet, Refills: 11      magnesium 30 mg Tab Take 1 tablet by mouth once daily.      montelukast (SINGULAIR) 10 mg tablet TAKE 1 TABLET BY MOUTH NIGHTLY  Qty: 90 tablet, Refills: 0      risedronate (ACTONEL) 35 MG tablet Take 1 tablet (35 mg total) by mouth every 7 days.  Qty: 12 tablet, Refills: 3      VENTOLIN HFA 90 mcg/actuation inhaler INHALE 2 PUFFS INTO THE LUNGS EVERY 6 HOURS AS NEEDED  Qty: 18 g, Refills: 5      blood sugar diagnostic Strp 1 strip by Misc.(Non-Drug; Combo Route) route once daily.  Qty: 100 strip, Refills: 11      blood-glucose meter kit Use as instructed  Qty: 1 each, Refills: 0      budesonide (PULMICORT) 0.5 mg/2 mL nebulizer solution       chlorhexidine (PERIDEX) 0.12 % solution       fluocinonide (LIDEX) 0.05 % gel Apply 1 application topically 2 (two) times daily.        fluticasone-salmeterol 500-50 mcg/dose (ADVAIR) 500-50 mcg/dose DsDv diskus inhaler Inhale 1 puff into the lungs 2 (two) times daily.  Qty: 180 each, Refills: 3      lancets (MICROLET LANCET) Misc Check blood sugar 2 times daily  Qty: 100 each, Refills: 3    Comments: Lancets for [employer] meter      mupirocin, bulk, 100 % Powd       NEBULIZER ACCESSORIES (NEBULIZER MISC)       olopatadine (PATANOL) 0.1 % ophthalmic solution Place 1 drop into both eyes 2 (two) times daily as needed. 1 Drops Ophthalmic Twice a day       vitamin D 185 MG Tab Take 185 mg by mouth once daily.           STOP taking these medications       acetaminophen (TYLENOL EXTRA STRENGTH) 500 MG tablet Comments:   Reason for Stopping:             Discharge Procedure Orders   Diet general     Change dressing (specify)   Order Comments: Change dressing prn saturation.

## 2019-06-17 NOTE — OP NOTE
DATE OF OPERATION: 6/17/2019    SURGEON:  Rosangela Colin MD     ASSISTANT SURGEON:      OPERATION:     1. Bilateral image-guided revision endoscopic total ethmoidectomy.  2. Bilateral image-guided revision endoscopic maxillary antrostomy.  3. Bilateral image-guided revision endoscopic sphenoidotomy.    PREOPERATIVE DIAGNOSIS:      1. Chronic rhinosinusitis.  2. Sinonasal polyposis.     POSTOPERATIVE DIAGNOSIS:      1. Chronic rhinosinusitis.  2. Sinonasal polyposis.     ANESTHESIA: General.     COMPLICATIONS: None.     ESTIMATED BLOOD LOSS: 5 mL     SPECIMEN: Bilateral sinonasal contents     WOUND EXPECTANCY: Clean-contaminated.    DRESSING: Chitogel in bilateral middle meatus.  No nasal packing.     FINDINGS: bilateral brittany polyposis involving bilateral ethmoid sinuses, bilateral sphenoid sinuses, and maxillary sinuses     INDICATIONS: Chronic rhinosinusitis with polyposis and nasal obstruction, not controlled with maximal medical therapy.     I discussed the risks, benefits and alternatives of surgical correction of the chronically obstructed sinuses and associated turbinate hypertrophy with the patient as well as the expected postoperative course. I gave her the opportunity to ask questions and I answered all of them. On the morning of surgery I again met with the patient and reviewed the indications for surgery and she consented to proceed.     DESCRIPTION OF PROCEDURE: The patient was brought to the operating room and placed supine on the operating table. The patient was placed under general anesthesia and intubated. The patient was positioned with a donut under the head and the image-guidance headset for the [employer] Fusion system was applied.  Image-guided navigation was indicated to facilitate exenteration of all ethmoid cells and the extent of sinusotomy.  The CT scan disc was loaded into the image-guidance system and registered with the patient tracking system according to the 's  instructions. The pointer was calibrated and registration was verified using predefined landmarks.  Cottonoid pledgets soaked with 4% cocaine was placed into the nasal cavity bilaterally for mucosal decongestion. Prophylactic cefazolin was given prior to the surgery start. A time-out was performed to confirm the proper patient, site and procedure. The CT images were again reviewed prior to surgical start.  The patient was prepped and draped in the usual fashion.  The bed was placed in 20-degree reverse Trendelenberg position.     A 0-degree endoscope was used to examine the nasal cavity.  Local injections of 1% lidocaine with 1:100,000 parts epinephrine were then performed around the middle turbinates bilaterally as well as the inferior turbinate and the sphenopalatine ganglion region bilaterally.     Attention was then turned to the sinuses. The left middle meatus was topically decongested using pledgets containing 10,000 units of thrombin with 1:10,000 parts epinephrine. The middle turbinate had been previously resected.   The uncinate process was then visualized and a tiana was used to incise the uncinate process. The uncinate was dissected to its superior attachment and removed using cutting forceps.  There was brittany polyps present in the middle meatus, ethmoid cavity, and emanating from the maxillary ostia.      The previous maxillary sinus antrostomy was not patent. This was entered using a curved suction to confirm the dimension of the lumen. The antrostomy was enlarged using cutting instruments, taking care not to move anterior to the maxillary line so as to avoid injury to the nasolacrimal duct.  Polypoid tissue  was present within the maxillary sinus.  This was thoroughly removed and sent for pathologic evaluation.     The ethmoid bulla had been previously resected.  There were brittany polyps and hyperostotic bone present in the ethmoid sinuses.   An Onodi cell was not present.  The mucosa was hypertrophic  throughout the sinuses, indicative of chronic inflammation.  Topical hemostatic agents on pledgets were then placed into the ethmoid cavity for hemostasis.    The sphenoid sinus rostrum was then identified.  Prior sphenoidotomy was not apparent.  The sinus was entered in a low and medial fashion, adjacent to the superior turbinate. This sphenoidotomy was enlarged to include the posterior segment of this superior turbinate and the natural ostium of the sphenoid sinus.  Polypoid tissue  was present within the sphenoid sinus.  This was thoroughly removed and sent for pathologic evaluation.       Attention was then turned to the right side of the sinonasal tract.  A similar procedure was performed on this side, including maxillary antrostomy, total ethmoidectomy, and sphenoidotomy.     At the conclusion of these procedures, the image-guidance probe was used to verify that all ethmoid cells had been properly opened and that the skull base was visible and that the lamina papyracea had not been traversed on either side.  All sinuses were copiously irrigated with warm normal saline solution.     At this point, the pledgets were all removed. Chitogel  was placed into the bilateral ethmoid cavities to stent open the surgical site.  The nasal cavity was not packed.  Mupirocin ointment was applied to the vestibule bilaterally.  At this point, the headset was removed and the drapes were taken down. Intravenous dexamethasone was given toward the end of the case. The patient was turned back toward the anesthesiologist and awakened from anesthesia, extubated and transferred to the recovery room in stable condition.

## 2019-06-17 NOTE — DISCHARGE INSTRUCTIONS
INSTRUCTIONS TO FOLLOW AFTER SINUS SURGERY  DR. Tauzin - [facility]      Your first office visit with Dr. Colin after surgery should have been already scheduled.  If you dont know when it is, call Dr. Colin's nurse Giselle at [phone].    ACTIVITY:    Sleep on your back with the head of the bead elevated, up on 2-3 pillows, or in a recliner for the first 3 to 5 days. This will help with swelling.     After surgery you may have a lot of nasal drainage. This is normal. Do not wipe, touch, or dab your nose. You may breathe through your nose if youre able but avoid inhaling forcibly. Let all drainage fall on your mustache dressing and change it as needed.    You may shower 24 hours after surgery.    If you use CPAP or BiPAP to sleep at night, you should wait at least 48 hours before resuming use.  Dr. Colin will advise you when it is safe to do this.    DRESSINGS:    Change the mustache dressing under your nose as needed. (If unsure what this dressing is or how to do this, ask your doctor or nurse before you leave the clinic/hospital.) You may have pinkish-red drainage for 2-3 days.    In certain cases you may have gauze packing inside your nostrils.  If so, these will turn from white to red from the drainage. This is normal so do not be alarmed. Do not touch or pull at the packing. The packing will be removed by your doctor at your first post-op visit.     You may also have a dissolvable stent or dissolvable sponge placed into the sinuses during surgery.  These usually do not need to be removed unless you are told otherwise by Dr. Colin.  You may notice small fragments of these items come out of your nose in the weeks following surgery.    MEDICATIONS:  After surgery, you are generally sent home with prescription for an antibiotic, a pain medication, and an anti-nausea medication.     Start your antibiotics when you get home from surgery and take them until they are all gone.  It is best to take them with  food to prevent an upset stomach.    Most people need prescription pain medication for the first few days after surgery.  If you find that this is not necessary, you may use over-the-counter Tylenol (Acetaminophen) instead.    Avoid Aspirin, Motrin (Ibuprofen), Advil, Aleve and Vitamin E for 1 week before surgery and 1 week after surgery. There are many other medications to avoid as well due to the fact they act as blood thinners.      Some people have problems with bowel movements after surgery. If you have NOT had a bowel movement 3-5 days after surgery, go to your local pharmacy and purchase an over the counter stool softener such as COLACE. You can also ask the pharmacist for his or her recommendation. If you still do not have a bowel movement after starting the softener, please call Dr. Kearns office.    You will need these over-the-counter medications after surgery:    Saline Sinus Rinse ([employer] brand):  You will use this to rinse out your nose and sinuses after surgery.  Begin doing this four days after surgery, unless instructed otherwise by Dr. Colin.  You should do this 2 times a day, following the instructions on the box.    Afrin (regular strength): Only use if you have nasal congestion or bleeding. Use 2 times per day for 3 days, stop for 1 day, continue 2 times per day for 3 days, then stop completely.  NOTE:  You may not need to do this at all.      RESTRICTED ACTIVITIES AFTER SURGERY:    Do NOT blow your nose for 2 weeks. If you have to sneeze or cough, do so with your mouth open.   AVOID all heavy lifting, straining or bending for 2 weeks.   AVOID any sexual activity for 2 weeks after surgery.  AVOID semi-contact sports or vigorous exercising for 3-4 weeks. Dr. Colin will let you know when you are cleared to resume exercise.  No Swimming for 3-4 weeks.  No Flying for 2 weeks.    Do NOT operate a motor vehicle or any type of heavy machinery within 24 hours of taking pain medication.  DO NOT  smoke or be around smokers.  AVOID irritating substances such as dust, chalk, harsh chemicals, and allergic triggers.    DIET:    Avoid hot and spicy foods, or salty soups for 1 week after surgery.  Begin with bland foods the day after surgery and advance to your regular diet as tolerated.  It is not necessary to take only soft food unless you are recovering from tonsil surgery.  Drink plenty of fluids (water is best).   Avoid alcoholic and caffeinated beverages for 1 week after surgery.        Nasal Surgery: Your Recovery  Youve just had nasal surgery. During the first weeks after surgery, be sure to follow the advice of your doctor. The tips on this sheet can help speed your recovery.  Tips for healing  · Dont take medicines containing aspirin or ibuprofen.  · Don't bump your nose or touch the splint or packing.  · Don't bend or lift.  · Sneeze or cough with your mouth open to reduce pressure inside your nose.  · Keep from blowing your nose until youre told its OK to do so.  · Keep eyeglasses from resting on your nose by taping them above the nose.  · Protect your nose from the sun.  · After packing is removed, start saltwater rinses if prescribed.  · Keep follow-up appointments with your doctor.  Follow-up visits  Your doctor will most likely want to see you within a week to check your healing. Any packing, splint, or dressings will probably be removed at that time. You may feel slight discomfort and bleed a little when this is done.  Assessing the results  During later follow-up visits, your doctor will assess your healing and the results of your surgery. Talk with your doctor about any problems or concerns you may have.  When to call the doctor  Call the doctor if you notice any of the following:  · Sudden increase in pain, swelling, or bruising  · Fever  · Heavy bleeding  · Yellow or greenish drainage from the nose  · Unrelieved headache  · Decreased or double vision  · Stiff neck or very tired feeling          ANESTHESIA  -For the first 24 hours after surgery:  Do not drive, use heavy equipment, make important decisions, or drink alcohol  -It is normal to feel sleepy for several hours.  Rest until you are more awake.  -Have someone stay with you, if needed.  They can watch for problems and help keep you safe.  -Some possible post anesthesia side effects include: nausea and vomiting, sore throat and hoarseness, sleepiness, and dizziness.    PAIN  -If you have pain after surgery, pain medicine will help you feel better.  Take it as directed, before pain becomes severe.  Most pain relievers taken by mouth need at least 20-30 minutes to start working.  -Do not drive or drink alcohol while taking pain medicine.  -Pain medication can upset your stomach.  Taking them with a little food may help.  -Other ways to help control pain: elevation, ice, and relaxation  -Call your surgeon if still having unmanageable pain an hour after taking pain medicine.  -Pain medicine can cause constipation.  Taking an over-the counter stool softener while on prescription pain medicine and drinking plenty of fluids can prevent this side effect.  -Call your surgeon if you have severe side effects like: breathing problems, trouble waking up, dizziness, confusion, or severe constipation.    NAUSEA  -Some people have nausea after surgery.  This is often because of anesthesia, pain, pain medicine, or the stress of surgery.  -Do not push yourself to eat.  Start off with clear liquids and soup.  Slowly move to solid foods.  Don't eat fatty, rich, spicy foods at first.  Eat smaller amounts.  -If you develop persistent nausea and vomiting please notify your surgeon immediately.    BLEEDING  -Different types of surgery require different types of care and dressing changes.  It is important to follow all instructions and advice from your surgeon.  Change dressing as directed.  Call your surgeon for any concerns regarding postop bleeding.    SIGNS OF  INFECTION  -Signs of infection include: fever, swelling, drainage, and redness  -Notify your surgeon if you have a fever of 100.4 F (38.0 C) or higher.  -Notify your surgeon if you notice redness, swelling, increased pain, pus, or a foul smell at the incision site.    Notify Dr. Colin for any problems or concerns

## 2019-06-17 NOTE — TRANSFER OF CARE
"Anesthesia Transfer of Care Note    Patient: Sussy Costa    Procedure(s) Performed: Procedure(s) (LRB):  FESS, USING COMPUTER-ASSISTED NAVIGATION (N/A)    Patient location: PACU    Anesthesia Type: general    Transport from OR: Transported from OR on 6-10 L/min O2 by face mask with adequate spontaneous ventilation    Post pain: adequate analgesia    Post assessment: no apparent anesthetic complications and tolerated procedure well    Post vital signs: stable    Level of consciousness: awake, alert and oriented    Nausea/Vomiting: no nausea/vomiting    Complications: none    Transfer of care protocol was followed      Last vitals:   Visit Vitals  BP (!) 183/84   Pulse 80   Temp 36.7 °C (98.1 °F) (Skin)   Resp 18   Ht 5' 6" (1.676 m)   Wt 63.5 kg (140 lb)   SpO2 96%   Breastfeeding? No   BMI 22.60 kg/m²     "

## 2019-06-17 NOTE — INTERVAL H&P NOTE
The patient has been examined and the H&P has been reviewed:    I concur with the findings and no changes have occurred since H&P was written.    Anesthesia/Surgery risks, benefits and alternative options discussed and understood by patient/family.          Active Hospital Problems    Diagnosis  POA    Other chronic sinusitis [J32.8]  Yes      Resolved Hospital Problems   No resolved problems to display.

## 2019-06-18 VITALS
WEIGHT: 140 LBS | TEMPERATURE: 98 F | BODY MASS INDEX: 22.5 KG/M2 | OXYGEN SATURATION: 96 % | DIASTOLIC BLOOD PRESSURE: 72 MMHG | HEART RATE: 74 BPM | SYSTOLIC BLOOD PRESSURE: 158 MMHG | RESPIRATION RATE: 18 BRPM | HEIGHT: 66 IN

## 2019-06-18 NOTE — ANESTHESIA POSTPROCEDURE EVALUATION
Anesthesia Post Evaluation    Patient: Sussy Costa    Procedure(s) Performed: Procedure(s) (LRB):  FESS, USING COMPUTER-ASSISTED NAVIGATION (N/A)    Final Anesthesia Type: general  Patient location during evaluation: PACU  Patient participation: Yes- Able to Participate  Level of consciousness: awake and alert  Post-procedure vital signs: reviewed and stable  Pain management: adequate  Airway patency: patent  PONV status at discharge: No PONV  Anesthetic complications: no      Cardiovascular status: blood pressure returned to baseline and hemodynamically stable  Respiratory status: unassisted  Hydration status: euvolemic  Follow-up not needed.          Vitals Value Taken Time   /72 6/17/2019  5:00 PM   Temp 36.8 °C (98.2 °F) 6/17/2019  2:55 PM   Pulse 74 6/17/2019  5:00 PM   Resp 18 6/17/2019  5:00 PM   SpO2 96 % 6/17/2019  5:00 PM         Event Time     Out of Recovery 14:55:00          Pain/Caitie Score: Caitie Score: 10 (6/17/2019  5:00 PM)

## 2019-06-20 ENCOUNTER — TELEPHONE (OUTPATIENT)
Dept: OTOLARYNGOLOGY | Facility: CLINIC | Age: 84
End: 2019-06-20

## 2019-06-20 NOTE — TELEPHONE ENCOUNTER
Spoke with patient she reports having small dark bloody drainage dripping in the back of the throat she is questioning if this is normal. Told patient yes this can be expected but advised patient to watch out if the bloody drainage is a bright red color if this does occur to please call the office. Also patient questioning if she can start using her nasal saline rinse. Told patient yes she can start using the rinse today. Advised patient to call office with anymore questions or concerns. Patient verbalized understanding with no further questions.

## 2019-06-20 NOTE — TELEPHONE ENCOUNTER
----- Message from Maris Hannon sent at 6/20/2019  8:44 AM CDT -----  Contact: [phone]/self  Patient requesting to speak with you concerning issues she's having with her nose and medication.    Please call back to assist at [phone]

## 2019-06-25 ENCOUNTER — OFFICE VISIT (OUTPATIENT)
Dept: OTOLARYNGOLOGY | Facility: CLINIC | Age: 84
End: 2019-06-25
Payer: MEDICARE

## 2019-06-25 VITALS
WEIGHT: 138.69 LBS | SYSTOLIC BLOOD PRESSURE: 135 MMHG | TEMPERATURE: 98 F | HEIGHT: 66 IN | BODY MASS INDEX: 22.29 KG/M2 | HEART RATE: 76 BPM | DIASTOLIC BLOOD PRESSURE: 70 MMHG

## 2019-06-25 DIAGNOSIS — J32.8 OTHER CHRONIC SINUSITIS: Primary | ICD-10-CM

## 2019-06-25 DIAGNOSIS — J33.8 POLYP OF PARANASAL SINUS: ICD-10-CM

## 2019-06-25 PROCEDURE — 31237 NSL/SINS NDSC SURG BX POLYPC: CPT | Mod: 50,S$PBB,, | Performed by: OTOLARYNGOLOGY

## 2019-06-25 PROCEDURE — 99999 PR PBB SHADOW E&M-EST. PATIENT-LVL V: ICD-10-PCS | Mod: PBBFAC,,, | Performed by: OTOLARYNGOLOGY

## 2019-06-25 PROCEDURE — 31237 PR NASAL/SINUS ENDOSCOPY,BX/RMV POLYP/DEBRID: ICD-10-PCS | Mod: 50,S$PBB,, | Performed by: OTOLARYNGOLOGY

## 2019-06-25 PROCEDURE — 99213 OFFICE O/P EST LOW 20 MIN: CPT | Mod: 25,S$PBB,, | Performed by: OTOLARYNGOLOGY

## 2019-06-25 PROCEDURE — 99213 PR OFFICE/OUTPT VISIT, EST, LEVL III, 20-29 MIN: ICD-10-PCS | Mod: 25,S$PBB,, | Performed by: OTOLARYNGOLOGY

## 2019-06-25 PROCEDURE — 31237 NSL/SINS NDSC SURG BX POLYPC: CPT | Mod: PBBFAC,PN | Performed by: OTOLARYNGOLOGY

## 2019-06-25 PROCEDURE — 99215 OFFICE O/P EST HI 40 MIN: CPT | Mod: PBBFAC,PN,25 | Performed by: OTOLARYNGOLOGY

## 2019-06-25 PROCEDURE — 99999 PR PBB SHADOW E&M-EST. PATIENT-LVL V: CPT | Mod: PBBFAC,,, | Performed by: OTOLARYNGOLOGY

## 2019-06-25 NOTE — PROGRESS NOTES
"  Subjective:      Sussy Costa is a 83 y.o. female who comes for follow-up 1 week status-post endoscopic sinus surgery.  She reports that she is doing quite well. She states that she has been using her nasal saline rinses as instructed.  She initially had bloody discharge but has no longer notices this occurring.  She states that she thinks some of the packing came out with her rinses.      Objective:     /70 (BP Location: Left arm, Patient Position: Sitting, BP Method: Large (Automatic))   Pulse 76   Temp 98 °F (36.7 °C) (Oral)   Ht 5' 6" (1.676 m)   Wt 62.9 kg (138 lb 10.7 oz)   BMI 22.38 kg/m²      General:   not in distress   Nasal:  edematous mucosa   no septal hematoma   no bleeding   Oral Cavity:   clear   Oropharynx:   no bleeding   Neck:   nontender       Procedure    Endoscopic debridement performed.  See procedure note.        Data Reviewed    Pathology report indicated chronic inflammation with eosinophilia.       Assessment:     Doing well following bilateral endoscopic sinus surgery.       1. Other chronic sinusitis    2. Polyp of paranasal sinus         Plan:     Resume budesonide nasal rinses. Will order nasoneb delivery system.   Follow up in about 1 week (around 7/2/2019).     Rosangela Isabel MD      "

## 2019-06-25 NOTE — PROCEDURES
Procedures     Endoscopic Sinonasal Debridement    Indication: Wound debridement following surgery for chronic sinusitis    Fiberoptic nasal endoscopy was used to assist with debridement of the sinonasal cavities as part of necessary postoperative care.  Informed consent was obtained prior to proceeding.  The nasal cavity was decongested with topical 0.25% phenylephrine and anesthetized with 4% lidocaine.  A rigid 0-degree endoscope was introduced into the nasal cavity.    Findings:  Nasal cavity:  inferior turbinates and septum intact   no synechiae   Ethmoid cavities:  dense crusted debris present bilaterally   debrided with Wagner forceps   widely patent following debridement   Maxillary sinus:  widely patent antrostomy bilaterally   dense crusted debris present bilaterally   Sphenoid sinus:  patent sphenoidotomy bilaterally   no polyps or exudate   The patient tolerated the procedure well.

## 2019-07-02 ENCOUNTER — OFFICE VISIT (OUTPATIENT)
Dept: OTOLARYNGOLOGY | Facility: CLINIC | Age: 84
End: 2019-07-02
Payer: MEDICARE

## 2019-07-02 VITALS
SYSTOLIC BLOOD PRESSURE: 157 MMHG | TEMPERATURE: 98 F | DIASTOLIC BLOOD PRESSURE: 71 MMHG | BODY MASS INDEX: 22.32 KG/M2 | HEIGHT: 66 IN | HEART RATE: 73 BPM | WEIGHT: 138.88 LBS

## 2019-07-02 DIAGNOSIS — J33.8 POLYP OF PARANASAL SINUS: ICD-10-CM

## 2019-07-02 DIAGNOSIS — J32.8 OTHER CHRONIC SINUSITIS: Primary | ICD-10-CM

## 2019-07-02 PROCEDURE — 31237 NSL/SINS NDSC SURG BX POLYPC: CPT | Mod: PBBFAC,PN | Performed by: OTOLARYNGOLOGY

## 2019-07-02 PROCEDURE — 99999 PR PBB SHADOW E&M-EST. PATIENT-LVL V: ICD-10-PCS | Mod: PBBFAC,,, | Performed by: OTOLARYNGOLOGY

## 2019-07-02 PROCEDURE — 99213 PR OFFICE/OUTPT VISIT, EST, LEVL III, 20-29 MIN: ICD-10-PCS | Mod: 25,S$PBB,, | Performed by: OTOLARYNGOLOGY

## 2019-07-02 PROCEDURE — 99999 PR PBB SHADOW E&M-EST. PATIENT-LVL V: CPT | Mod: PBBFAC,,, | Performed by: OTOLARYNGOLOGY

## 2019-07-02 PROCEDURE — 99213 OFFICE O/P EST LOW 20 MIN: CPT | Mod: 25,S$PBB,, | Performed by: OTOLARYNGOLOGY

## 2019-07-02 PROCEDURE — 31237 NSL/SINS NDSC SURG BX POLYPC: CPT | Mod: 50,S$PBB,, | Performed by: OTOLARYNGOLOGY

## 2019-07-02 PROCEDURE — 99215 OFFICE O/P EST HI 40 MIN: CPT | Mod: PBBFAC,PN | Performed by: OTOLARYNGOLOGY

## 2019-07-02 PROCEDURE — 31237 PR NASAL/SINUS ENDOSCOPY,BX/RMV POLYP/DEBRID: ICD-10-PCS | Mod: 50,S$PBB,, | Performed by: OTOLARYNGOLOGY

## 2019-07-02 NOTE — PROGRESS NOTES
"CC: follow up surgery    Subjective:      Sussy Costa is a 83 y.o. female who comes for follow-up nearly 3 weeks status-post endoscopic sinus surgery.  She reports that she is doing quite well. She states that she has been using her nasal saline rinses as instructed. She reports minimal discharge. She denies epistaxis. She feels she is breathing well through her nose.     Objective:     BP (!) 157/71 (BP Location: Left arm, Patient Position: Sitting, BP Method: Large (Automatic))   Pulse 73   Temp 98.1 °F (36.7 °C) (Oral)   Ht 5' 6" (1.676 m)   Wt 63 kg (138 lb 14.2 oz)   BMI 22.42 kg/m²      General:   not in distress   Nasal:  edematous mucosa   no septal hematoma   no bleeding   Oral Cavity:   clear   Oropharynx:   no bleeding   Neck:   nontender       Procedure    Endoscopic debridement performed.  See procedure note.        Data Reviewed    Pathology report indicated chronic inflammation with eosinophilia.       Assessment:     Doing well following bilateral endoscopic sinus surgery.       1. Other chronic sinusitis    2. Polyp of paranasal sinus         Plan:     Resume budesonide nasal nebulized solution.   Follow up in about 3 weeks (around 7/23/2019).     Rosangela Isabel MD    "

## 2019-07-02 NOTE — PROCEDURES
Procedures       Endoscopic Sinonasal Debridement    Indication: Wound debridement following surgery for chronic sinusitis    Fiberoptic nasal endoscopy was used to assist with debridement of the sinonasal cavities as part of necessary postoperative care.  Informed consent was obtained prior to proceeding.  The nasal cavity was decongested with topical 0.25% phenylephrine and anesthetized with 4% lidocaine.  A rigid 0-degree endoscope was introduced into the nasal cavity.    Findings:  Nasal cavity:  inferior turbinates and septum intact   no synechiae   Ethmoid cavities:  minimal debris bilaterally   debrided with Wagner forceps   widely patent following debridement   Maxillary sinus:  widely patent antrostomy bilaterally   minimal debris bilaterally   Sphenoid sinus:  patent sphenoidotomy bilaterally   no polyps or exudate   The patient tolerated the procedure well.

## 2019-07-23 ENCOUNTER — OFFICE VISIT (OUTPATIENT)
Dept: OTOLARYNGOLOGY | Facility: CLINIC | Age: 84
End: 2019-07-23
Payer: MEDICARE

## 2019-07-23 VITALS
TEMPERATURE: 97 F | DIASTOLIC BLOOD PRESSURE: 69 MMHG | WEIGHT: 138.69 LBS | BODY MASS INDEX: 22.29 KG/M2 | HEIGHT: 66 IN | SYSTOLIC BLOOD PRESSURE: 141 MMHG | HEART RATE: 77 BPM

## 2019-07-23 DIAGNOSIS — J33.8 POLYP OF PARANASAL SINUS: ICD-10-CM

## 2019-07-23 DIAGNOSIS — J32.8 OTHER CHRONIC SINUSITIS: Primary | ICD-10-CM

## 2019-07-23 PROCEDURE — 31237 NSL/SINS NDSC SURG BX POLYPC: CPT | Mod: S$PBB,50,, | Performed by: OTOLARYNGOLOGY

## 2019-07-23 PROCEDURE — 99999 PR PBB SHADOW E&M-EST. PATIENT-LVL IV: CPT | Mod: PBBFAC,,, | Performed by: OTOLARYNGOLOGY

## 2019-07-23 PROCEDURE — 99213 PR OFFICE/OUTPT VISIT, EST, LEVL III, 20-29 MIN: ICD-10-PCS | Mod: 25,S$PBB,, | Performed by: OTOLARYNGOLOGY

## 2019-07-23 PROCEDURE — 99999 PR PBB SHADOW E&M-EST. PATIENT-LVL IV: ICD-10-PCS | Mod: PBBFAC,,, | Performed by: OTOLARYNGOLOGY

## 2019-07-23 PROCEDURE — 31237 NSL/SINS NDSC SURG BX POLYPC: CPT | Mod: PBBFAC,PN | Performed by: OTOLARYNGOLOGY

## 2019-07-23 PROCEDURE — 99214 OFFICE O/P EST MOD 30 MIN: CPT | Mod: PBBFAC,PN,25 | Performed by: OTOLARYNGOLOGY

## 2019-07-23 PROCEDURE — 31237 PR NASAL/SINUS ENDOSCOPY,BX/RMV POLYP/DEBRID: ICD-10-PCS | Mod: S$PBB,50,, | Performed by: OTOLARYNGOLOGY

## 2019-07-23 PROCEDURE — 99213 OFFICE O/P EST LOW 20 MIN: CPT | Mod: 25,S$PBB,, | Performed by: OTOLARYNGOLOGY

## 2019-07-23 NOTE — PROGRESS NOTES
"CC: follow up surgery    Subjective:      Sussy Costa is a 83 y.o. 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  She reports that she has been good since last visit. She states that she has been using her nasal saline rinses as instructed. She notes that she has a pink discharge with her nasal saline rinses otherwise no rhinorrhea. She denies epistaxis. She feels she is breathing is improved since surgery.      Objective:     BP (!) 141/69 (BP Location: Left arm, Patient Position: Sitting, BP Method: Large (Automatic))   Pulse 77   Temp 97.1 °F (36.2 °C) (Oral)   Ht 5' 6" (1.676 m)   Wt 62.9 kg (138 lb 10.7 oz)   BMI 22.38 kg/m²      General:   not in distress   Nasal:  edematous mucosa   no septal hematoma   no bleeding   Oral Cavity:   clear   Oropharynx:   no bleeding   Neck:   nontender       Procedure    Endoscopic debridement performed.  See procedure note.        Data Reviewed    Pathology report indicated chronic inflammation with eosinophilia.       Assessment:     Doing well following bilateral endoscopic sinus surgery.       1. Other chronic sinusitis    2. Polyp of paranasal sinus         Plan:     Resume budesonide nasal nebulized solution. Continue Singularir and Xyzal.   Follow up in about 4 weeks (around 8/20/2019).     Rosangela Isabel MD  "

## 2019-07-26 ENCOUNTER — LAB VISIT (OUTPATIENT)
Dept: LAB | Facility: HOSPITAL | Age: 84
End: 2019-07-26
Attending: INTERNAL MEDICINE
Payer: MEDICARE

## 2019-07-26 DIAGNOSIS — E11.9 DIABETES MELLITUS WITHOUT COMPLICATION: ICD-10-CM

## 2019-07-26 DIAGNOSIS — E11.59 HYPERTENSION ASSOCIATED WITH DIABETES: ICD-10-CM

## 2019-07-26 DIAGNOSIS — E78.5 DYSLIPIDEMIA ASSOCIATED WITH TYPE 2 DIABETES MELLITUS: ICD-10-CM

## 2019-07-26 DIAGNOSIS — E11.69 DYSLIPIDEMIA ASSOCIATED WITH TYPE 2 DIABETES MELLITUS: ICD-10-CM

## 2019-07-26 DIAGNOSIS — I15.2 HYPERTENSION ASSOCIATED WITH DIABETES: ICD-10-CM

## 2019-07-26 LAB
ALBUMIN SERPL BCP-MCNC: 3.5 G/DL (ref 3.5–5.2)
ALP SERPL-CCNC: 73 U/L (ref 55–135)
ALT SERPL W/O P-5'-P-CCNC: 15 U/L (ref 10–44)
ANION GAP SERPL CALC-SCNC: 10 MMOL/L (ref 8–16)
AST SERPL-CCNC: 19 U/L (ref 10–40)
BASOPHILS # BLD AUTO: 0.12 K/UL (ref 0–0.2)
BASOPHILS NFR BLD: 1.3 % (ref 0–1.9)
BILIRUB SERPL-MCNC: 0.7 MG/DL (ref 0.1–1)
BUN SERPL-MCNC: 17 MG/DL (ref 8–23)
CALCIUM SERPL-MCNC: 9.9 MG/DL (ref 8.7–10.5)
CHLORIDE SERPL-SCNC: 106 MMOL/L (ref 95–110)
CHOLEST SERPL-MCNC: 150 MG/DL (ref 120–199)
CHOLEST/HDLC SERPL: 2.9 {RATIO} (ref 2–5)
CO2 SERPL-SCNC: 28 MMOL/L (ref 23–29)
CREAT SERPL-MCNC: 0.9 MG/DL (ref 0.5–1.4)
DIFFERENTIAL METHOD: ABNORMAL
EOSINOPHIL # BLD AUTO: 1.1 K/UL (ref 0–0.5)
EOSINOPHIL NFR BLD: 12.2 % (ref 0–8)
ERYTHROCYTE [DISTWIDTH] IN BLOOD BY AUTOMATED COUNT: 14.2 % (ref 11.5–14.5)
EST. GFR  (AFRICAN AMERICAN): >60 ML/MIN/1.73 M^2
EST. GFR  (NON AFRICAN AMERICAN): 59.7 ML/MIN/1.73 M^2
ESTIMATED AVG GLUCOSE: 166 MG/DL (ref 68–131)
GLUCOSE SERPL-MCNC: 133 MG/DL (ref 70–110)
HBA1C MFR BLD HPLC: 7.4 % (ref 4–5.6)
HCT VFR BLD AUTO: 41.9 % (ref 37–48.5)
HDLC SERPL-MCNC: 52 MG/DL (ref 40–75)
HDLC SERPL: 34.7 % (ref 20–50)
HGB BLD-MCNC: 13 G/DL (ref 12–16)
IMM GRANULOCYTES # BLD AUTO: 0.02 K/UL (ref 0–0.04)
IMM GRANULOCYTES NFR BLD AUTO: 0.2 % (ref 0–0.5)
LDLC SERPL CALC-MCNC: 82.2 MG/DL (ref 63–159)
LYMPHOCYTES # BLD AUTO: 2.4 K/UL (ref 1–4.8)
LYMPHOCYTES NFR BLD: 25.2 % (ref 18–48)
MCH RBC QN AUTO: 29.2 PG (ref 27–31)
MCHC RBC AUTO-ENTMCNC: 31 G/DL (ref 32–36)
MCV RBC AUTO: 94 FL (ref 82–98)
MONOCYTES # BLD AUTO: 0.9 K/UL (ref 0.3–1)
MONOCYTES NFR BLD: 9.8 % (ref 4–15)
NEUTROPHILS # BLD AUTO: 4.8 K/UL (ref 1.8–7.7)
NEUTROPHILS NFR BLD: 51.3 % (ref 38–73)
NONHDLC SERPL-MCNC: 98 MG/DL
NRBC BLD-RTO: 0 /100 WBC
PLATELET # BLD AUTO: 322 K/UL (ref 150–350)
PMV BLD AUTO: 11.6 FL (ref 9.2–12.9)
POTASSIUM SERPL-SCNC: 4 MMOL/L (ref 3.5–5.1)
PROT SERPL-MCNC: 6.6 G/DL (ref 6–8.4)
RBC # BLD AUTO: 4.45 M/UL (ref 4–5.4)
SODIUM SERPL-SCNC: 144 MMOL/L (ref 136–145)
TRIGL SERPL-MCNC: 79 MG/DL (ref 30–150)
TSH SERPL DL<=0.005 MIU/L-ACNC: 3.04 UIU/ML (ref 0.4–4)
WBC # BLD AUTO: 9.35 K/UL (ref 3.9–12.7)

## 2019-07-26 PROCEDURE — 80061 LIPID PANEL: CPT

## 2019-07-26 PROCEDURE — 84443 ASSAY THYROID STIM HORMONE: CPT

## 2019-07-26 PROCEDURE — 83036 HEMOGLOBIN GLYCOSYLATED A1C: CPT

## 2019-07-26 PROCEDURE — 36415 COLL VENOUS BLD VENIPUNCTURE: CPT | Mod: PO

## 2019-07-26 PROCEDURE — 85025 COMPLETE CBC W/AUTO DIFF WBC: CPT

## 2019-07-26 PROCEDURE — 80053 COMPREHEN METABOLIC PANEL: CPT

## 2019-07-30 ENCOUNTER — TELEPHONE (OUTPATIENT)
Dept: INTERNAL MEDICINE | Facility: CLINIC | Age: 84
End: 2019-07-30

## 2019-07-30 NOTE — TELEPHONE ENCOUNTER
----- Message from Shoshana Miller sent at 7/30/2019 10:52 AM CDT -----  Contact: pt [phone]  Patient would like to get test results  Name of test (lab, mammo, etc.):   labs  Date of test:  7/26  Ordering provider: Vinny  Where was the test performed:  [place]  Would the patient rather a call back or a response via [facility]?:  Call back   Comments:

## 2019-07-30 NOTE — TELEPHONE ENCOUNTER
Advise overall labs looked god .  Diabetic control is reasonable at this time. Continue current meds.  F/u 6 mos

## 2019-08-01 DIAGNOSIS — E11.29 TYPE 2 DIABETES MELLITUS WITH MICROALBUMINURIA, WITHOUT LONG-TERM CURRENT USE OF INSULIN: Primary | ICD-10-CM

## 2019-08-01 DIAGNOSIS — R80.9 TYPE 2 DIABETES MELLITUS WITH MICROALBUMINURIA, WITHOUT LONG-TERM CURRENT USE OF INSULIN: Primary | ICD-10-CM

## 2019-08-01 RX ORDER — RISEDRONATE SODIUM 35 MG/1
35 TABLET, FILM COATED ORAL
Qty: 12 TABLET | Refills: 3 | Status: SHIPPED | OUTPATIENT
Start: 2019-08-01 | End: 2019-09-25 | Stop reason: SDUPTHER

## 2019-08-01 NOTE — TELEPHONE ENCOUNTER
----- Message from Maria Eugenia Angela sent at 8/1/2019 10:32 AM CDT -----  Contact: Sussy  tel:   [phone]  Needs Advice    Reason for call:  Pt.says she contacted you 2 wks ago and has been out of medication.   Received no reply from the 's ofc.      [facility]   [phone] /  Needs 3 mos. Supply of Risedronate Sod.   35mgs. .  As per caller .        Communication Preference:  Phone     Additional Information:  pls call .

## 2019-08-05 ENCOUNTER — TELEPHONE (OUTPATIENT)
Dept: INTERNAL MEDICINE | Facility: CLINIC | Age: 84
End: 2019-08-05

## 2019-08-05 RX ORDER — ATORVASTATIN CALCIUM 20 MG/1
TABLET, FILM COATED ORAL
Qty: 90 TABLET | Refills: 0 | Status: SHIPPED | OUTPATIENT
Start: 2019-08-05 | End: 2019-08-20 | Stop reason: SDUPTHER

## 2019-08-05 RX ORDER — MONTELUKAST SODIUM 10 MG/1
10 TABLET ORAL NIGHTLY
Qty: 90 TABLET | Refills: 0 | Status: SHIPPED | OUTPATIENT
Start: 2019-08-05 | End: 2019-12-02 | Stop reason: SDUPTHER

## 2019-08-05 NOTE — TELEPHONE ENCOUNTER
----- Message from Karen Griffiths sent at 8/5/2019  8:44 AM CDT -----  Contact: Markos Grissom @ [facility] [phone], Fax# [phone]  Patient is calling for an RX refill or new RX.  Is this a refill or new RX:  Refill  RX name and strength: atorvastatin (LIPITOR) 20 MG tablet  Directions (copy/paste from chart):  N/A  Is this a 30 day or 90 day RX:  90  Local pharmacy or mail order pharmacy:  [facility] [place] [address] name and phone # [facility] Phone# [phone], Fax# [phone]

## 2019-08-20 ENCOUNTER — OFFICE VISIT (OUTPATIENT)
Dept: OTOLARYNGOLOGY | Facility: CLINIC | Age: 84
End: 2019-08-20
Payer: MEDICARE

## 2019-08-20 VITALS
HEIGHT: 66 IN | BODY MASS INDEX: 22.8 KG/M2 | DIASTOLIC BLOOD PRESSURE: 82 MMHG | SYSTOLIC BLOOD PRESSURE: 167 MMHG | HEART RATE: 76 BPM | WEIGHT: 141.88 LBS | TEMPERATURE: 98 F

## 2019-08-20 DIAGNOSIS — J32.8 OTHER CHRONIC SINUSITIS: Primary | ICD-10-CM

## 2019-08-20 DIAGNOSIS — J33.8 POLYP OF PARANASAL SINUS: ICD-10-CM

## 2019-08-20 PROCEDURE — 31237 NSL/SINS NDSC SURG BX POLYPC: CPT | Mod: 50,S$PBB,, | Performed by: OTOLARYNGOLOGY

## 2019-08-20 PROCEDURE — 31237 NSL/SINS NDSC SURG BX POLYPC: CPT | Mod: PBBFAC,PN | Performed by: OTOLARYNGOLOGY

## 2019-08-20 PROCEDURE — 99213 OFFICE O/P EST LOW 20 MIN: CPT | Mod: 25,S$PBB,, | Performed by: OTOLARYNGOLOGY

## 2019-08-20 PROCEDURE — 99999 PR PBB SHADOW E&M-EST. PATIENT-LVL V: ICD-10-PCS | Mod: PBBFAC,,, | Performed by: OTOLARYNGOLOGY

## 2019-08-20 PROCEDURE — 99213 PR OFFICE/OUTPT VISIT, EST, LEVL III, 20-29 MIN: ICD-10-PCS | Mod: 25,S$PBB,, | Performed by: OTOLARYNGOLOGY

## 2019-08-20 PROCEDURE — 99999 PR PBB SHADOW E&M-EST. PATIENT-LVL V: CPT | Mod: PBBFAC,,, | Performed by: OTOLARYNGOLOGY

## 2019-08-20 PROCEDURE — 31237 PR NASAL/SINUS ENDOSCOPY,BX/RMV POLYP/DEBRID: ICD-10-PCS | Mod: 50,S$PBB,, | Performed by: OTOLARYNGOLOGY

## 2019-08-20 PROCEDURE — 99215 OFFICE O/P EST HI 40 MIN: CPT | Mod: PBBFAC,PN | Performed by: OTOLARYNGOLOGY

## 2019-08-20 RX ORDER — LANCETS
EACH MISCELLANEOUS
Qty: 100 EACH | Refills: 3 | Status: SHIPPED | OUTPATIENT
Start: 2019-08-20 | End: 2020-10-15 | Stop reason: SDUPTHER

## 2019-08-20 RX ORDER — ATORVASTATIN CALCIUM 20 MG/1
TABLET, FILM COATED ORAL
Qty: 90 TABLET | Refills: 0 | Status: SHIPPED | OUTPATIENT
Start: 2019-08-20 | End: 2019-12-02 | Stop reason: SDUPTHER

## 2019-08-20 NOTE — PROCEDURES
Procedures       Endoscopic Sinonasal Debridement    Indication: Wound debridement following surgery for chronic sinusitis    Fiberoptic nasal endoscopy was used to assist with debridement of the sinonasal cavities as part of necessary postoperative care.  Informed consent was obtained prior to proceeding.  The nasal cavity was decongested with topical 0.25% phenylephrine and anesthetized with 4% lidocaine.  A rigid 0-degree endoscope was introduced into the nasal cavity.    Findings:  Nasal cavity:  inferior turbinates and septum intact   no synechiae   Ethmoid cavities:  dense crusted debris present bilaterally   debrided with Wagner forceps   widely patent following debridement   Maxillary sinus:  widely patent antrostomy bilaterally   minimal debris bilaterally   Sphenoid sinus:  patent sphenoidotomy bilaterally   no polyps or exudate   The patient tolerated the procedure well.

## 2019-08-20 NOTE — PROGRESS NOTES
"CC: follow up surgery    Subjective:      Sussy Costa is a 83 y.o. 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  She reports that she has been good since last visit. She states that she has been using her nasal saline rinses and budesonide nasal nebulizer as instructed. She notes some discharge after her nasal rinses but otherwise no complaints. She denies epistaxis. She feels she is breathing better since surgery.      Objective:     BP (!) 167/82 (BP Location: Left arm, Patient Position: Sitting, BP Method: Large (Automatic)) Comment: Patient takes BP Med in the evening.  Pulse 76   Temp 97.6 °F (36.4 °C) (Oral)   Ht 5' 6" (1.676 m)   Wt 64.4 kg (141 lb 13.9 oz)   BMI 22.90 kg/m²      General:   not in distress   Nasal:  edematous mucosa   no septal hematoma   no bleeding   Oral Cavity:   clear   Oropharynx:   no bleeding   Neck:   nontender       Procedure    Endoscopic debridement performed.  See procedure note.        Data Reviewed    Pathology report indicated chronic inflammation with eosinophilia.       Assessment:     Doing well following bilateral endoscopic sinus surgery for recurrent CRS with nasal polyposis.       1. Other chronic sinusitis    2. Polyp of paranasal sinus         Plan:     Continue budesonide nasal nebulized solution and nasal saline rinses.  Continue Singularir and Xyzal.   Follow up in about 6 weeks (around 10/1/2019).     Rosangela Isabel MD  "

## 2019-08-20 NOTE — TELEPHONE ENCOUNTER
----- Message from Ladonna Manuel sent at 8/20/2019 12:30 PM CDT -----  Contact: self   Diabetic or Medical Supplies.  What supplies are needed: Lancets & test strips  What is the brand name of the supplies: Accu Check  Is this a refill or new prescription:  new  Who prescribed the supplies:  Dr Casillas   Pharmacy or company name, phone # and location:  [facility] - [place] - [place], [place] - [address] [phone] (Phone)  [phone] (Fax)  Comments:

## 2019-09-18 ENCOUNTER — TELEPHONE (OUTPATIENT)
Dept: INTERNAL MEDICINE | Facility: CLINIC | Age: 84
End: 2019-09-18

## 2019-09-18 DIAGNOSIS — Z12.39 BREAST CANCER SCREENING: Primary | ICD-10-CM

## 2019-09-18 NOTE — TELEPHONE ENCOUNTER
----- Message from Natalie Zhang sent at 9/18/2019  1:24 PM CDT -----  Contact: Pt [phone]  Caller is requesting to schedule their annual screening mammogram appointment. Order is not listed in Epic.  Please enter order and contact patient to schedule.

## 2019-09-23 ENCOUNTER — PATIENT OUTREACH (OUTPATIENT)
Dept: ADMINISTRATIVE | Facility: OTHER | Age: 84
End: 2019-09-23

## 2019-09-25 ENCOUNTER — OFFICE VISIT (OUTPATIENT)
Dept: ENDOCRINOLOGY | Facility: CLINIC | Age: 84
End: 2019-09-25
Payer: MEDICARE

## 2019-09-25 VITALS
DIASTOLIC BLOOD PRESSURE: 82 MMHG | BODY MASS INDEX: 22.66 KG/M2 | WEIGHT: 141 LBS | SYSTOLIC BLOOD PRESSURE: 150 MMHG | HEIGHT: 66 IN

## 2019-09-25 DIAGNOSIS — E78.5 HYPERLIPIDEMIA, UNSPECIFIED HYPERLIPIDEMIA TYPE: ICD-10-CM

## 2019-09-25 DIAGNOSIS — M81.0 SENILE OSTEOPOROSIS: Primary | ICD-10-CM

## 2019-09-25 DIAGNOSIS — E11.29 TYPE 2 DIABETES MELLITUS WITH MICROALBUMINURIA, WITHOUT LONG-TERM CURRENT USE OF INSULIN: ICD-10-CM

## 2019-09-25 DIAGNOSIS — R80.9 TYPE 2 DIABETES MELLITUS WITH MICROALBUMINURIA, WITHOUT LONG-TERM CURRENT USE OF INSULIN: ICD-10-CM

## 2019-09-25 PROCEDURE — 99999 PR PBB SHADOW E&M-EST. PATIENT-LVL III: CPT | Mod: PBBFAC,,, | Performed by: INTERNAL MEDICINE

## 2019-09-25 PROCEDURE — 99214 OFFICE O/P EST MOD 30 MIN: CPT | Mod: S$PBB,,, | Performed by: INTERNAL MEDICINE

## 2019-09-25 PROCEDURE — 99999 PR PBB SHADOW E&M-EST. PATIENT-LVL III: ICD-10-PCS | Mod: PBBFAC,,, | Performed by: INTERNAL MEDICINE

## 2019-09-25 PROCEDURE — 99214 PR OFFICE/OUTPT VISIT, EST, LEVL IV, 30-39 MIN: ICD-10-PCS | Mod: S$PBB,,, | Performed by: INTERNAL MEDICINE

## 2019-09-25 PROCEDURE — 99213 OFFICE O/P EST LOW 20 MIN: CPT | Mod: PBBFAC | Performed by: INTERNAL MEDICINE

## 2019-09-25 RX ORDER — RISEDRONATE SODIUM 35 MG/1
35 TABLET, FILM COATED ORAL
Qty: 12 TABLET | Refills: 3 | Status: SHIPPED | OUTPATIENT
Start: 2019-09-25 | End: 2020-09-25

## 2019-09-25 NOTE — PROGRESS NOTES
Subjective:      Patient ID: Sussy Costa is a 83 y.o. female.    Chief Complaint:  Senile Osteoporosis      History of Present Illness  Ms. Subramanianlpresents for follow up of osteoporosis. Last visit 8/2018.     She was given first dose of Prolia in 2/2018 and developed joint pains and significant lower ext edema so it was decided that she would not receive another dose of Prolia. We decided against Reclast for the same reason and instead restarted her on an oral bisphosphonate which she has tolerated without side effects.     First diagnosed with osteoporosis in 2007.     Current osteoporosis regimen:  Back on Actonel sine 8/2018.  Vitamin D 1000 units daily     Osteoporosis medication history:  Actonel  Fosamax  Prolia 60 mg x 1 (1st dose 2/2018), had to stop Prolia     Risk factors for osteoporosis  -Personal history of fracture: had a fracture of left 5th finger, no hx of fragility frax  -Family history of hip fracture or osteoporosis: mother had hip frax at age 92  -Premature/Early menopause: menopause at age 56, on HRT for a few months in the past  -Chronic steroid therapy: intermittently gets steroid taper for asthma  -History of rheumatoid arthritis: none  -Smoking History: never smoked  -Current EtOH use: social EtOH  -Fall Risk: good balance, has tripped and fallen twice in past 10-15 years     Risk for Secondary Osteoporosis:  -TSH: recent TSH WNL  -Calcium: calcium level was high so calcium supplementation stopped. Had elevated PTH but this was shortly after Prolia injection  -Vitamin D: WNL  -Anemia and renal insufficiency: some anemia, no CKD  -Signs/symptoms of malabsorption: none  -PPI use: none  -Diabetes: Last A1c at goal   -Chronic liver disease: none  -Hx gastric bypass surgery: none     Height loss:  1/2 inch height loss     Exercise:  No formal exercise  No falls     Pending dental work:  None     Last Bone Density Scan dated 11/13/2017:    BONE MINERAL DENSITY RESULTS:  Lumbar Spine: Lumbar  bone mineral density L1-L4 is 0.870g/cm2, which is a t-score of -1.6. The z-score is 1.1.    Total Hip: The total hip bone mineral density is 0.629g/cm2.  The t-score is -2.6, and the z-score is -0.5.  Femoral neck BMD is 0.554g/cm2 and the t-score is -2.7.    COMPARISONS:  Date Location BMD T-score  05/28/15 L-spine 0.875 -1.6  Total Hip 0.707 -1.9   Impression        Osteoporosis of the femoral neck and total hip. There is a significant decline in BMD at the total hip (-11%) when compared to previous study.   No change at the lumbar spine.       Diabetes controlled on metformin and glimepiride. UTD with eye exam and urine micro     Review of Systems   Constitutional: Negative for chills and fever.   Gastrointestinal: Negative for nausea.       Objective:   Physical Exam   Nursing note and vitals reviewed.    BP Readings from Last 3 Encounters:   09/25/19 (!) 150/82   08/20/19 (!) 167/82   07/23/19 (!) 141/69     Wt Readings from Last 1 Encounters:   09/25/19 1106 64 kg (140 lb 15.8 oz)       Body mass index is 22.76 kg/m².    Lab Review:   Lab Results   Component Value Date    HGBA1C 7.4 (H) 07/26/2019     Lab Results   Component Value Date    CHOL 150 07/26/2019    HDL 52 07/26/2019    LDLCALC 82.2 07/26/2019    TRIG 79 07/26/2019    CHOLHDL 34.7 07/26/2019     Lab Results   Component Value Date     07/26/2019    K 4.0 07/26/2019     07/26/2019    CO2 28 07/26/2019     (H) 07/26/2019    BUN 17 07/26/2019    CREATININE 0.9 07/26/2019    CALCIUM 9.9 07/26/2019    PROT 6.6 07/26/2019    ALBUMIN 3.5 07/26/2019    BILITOT 0.7 07/26/2019    ALKPHOS 73 07/26/2019    AST 19 07/26/2019    ALT 15 07/26/2019    ANIONGAP 10 07/26/2019    ESTGFRAFRICA >60.0 07/26/2019    EGFRNONAA 59.7 (A) 07/26/2019    TSH 3.043 07/26/2019         Assessment and Plan     Senile osteoporosis  --Patient with postmenopausal osteoporosis  --Risk factors include: age, post menopausal status, and fam hx of hip fracture  --Will  continue Actonel 35 mg weekly  --Repeat BMD scheduled for 11/2019  --To continue vit d replacement      Diabetes mellitus, type II  --A1c at goal on single agent glimepiride  --Managed by PCP    Hyperlipidemia  --On statin    Joshua Martines M.D. Staff Endocrinology

## 2019-09-25 NOTE — ASSESSMENT & PLAN NOTE
--Patient with postmenopausal osteoporosis  --Risk factors include: age, post menopausal status, and fam hx of hip fracture  --Will continue Actonel 35 mg weekly  --Repeat BMD scheduled for 11/2019  --To continue vit d replacement

## 2019-10-01 ENCOUNTER — HOSPITAL ENCOUNTER (OUTPATIENT)
Dept: RADIOLOGY | Facility: HOSPITAL | Age: 84
Discharge: HOME | End: 2019-10-01
Attending: INTERNAL MEDICINE
Payer: MEDICARE

## 2019-10-01 DIAGNOSIS — Z12.39 BREAST CANCER SCREENING: ICD-10-CM

## 2019-10-01 PROCEDURE — 77067 MAMMO DIGITAL SCREENING BILAT WITH TOMOSYNTHESIS_CAD: ICD-10-PCS | Mod: 26,,, | Performed by: RADIOLOGY

## 2019-10-01 PROCEDURE — 77067 SCR MAMMO BI INCL CAD: CPT | Mod: 26,,, | Performed by: RADIOLOGY

## 2019-10-01 PROCEDURE — 77063 MAMMO DIGITAL SCREENING BILAT WITH TOMOSYNTHESIS_CAD: ICD-10-PCS | Mod: 26,,, | Performed by: RADIOLOGY

## 2019-10-01 PROCEDURE — 77067 SCR MAMMO BI INCL CAD: CPT | Mod: TC,PO

## 2019-10-01 PROCEDURE — 77063 BREAST TOMOSYNTHESIS BI: CPT | Mod: 26,,, | Performed by: RADIOLOGY

## 2019-10-14 ENCOUNTER — PATIENT OUTREACH (OUTPATIENT)
Dept: ADMINISTRATIVE | Facility: OTHER | Age: 84
End: 2019-10-14

## 2019-10-15 ENCOUNTER — OFFICE VISIT (OUTPATIENT)
Dept: OTOLARYNGOLOGY | Facility: CLINIC | Age: 84
End: 2019-10-15
Payer: MEDICARE

## 2019-10-15 VITALS
HEART RATE: 80 BPM | DIASTOLIC BLOOD PRESSURE: 69 MMHG | HEIGHT: 66 IN | BODY MASS INDEX: 22.39 KG/M2 | WEIGHT: 139.31 LBS | TEMPERATURE: 98 F | SYSTOLIC BLOOD PRESSURE: 140 MMHG

## 2019-10-15 DIAGNOSIS — J32.8 OTHER CHRONIC SINUSITIS: Primary | ICD-10-CM

## 2019-10-15 DIAGNOSIS — J30.89 NON-SEASONAL ALLERGIC RHINITIS, UNSPECIFIED TRIGGER: ICD-10-CM

## 2019-10-15 DIAGNOSIS — J33.8 POLYP OF PARANASAL SINUS: ICD-10-CM

## 2019-10-15 PROCEDURE — 99213 OFFICE O/P EST LOW 20 MIN: CPT | Mod: 25,S$PBB,, | Performed by: OTOLARYNGOLOGY

## 2019-10-15 PROCEDURE — 99215 OFFICE O/P EST HI 40 MIN: CPT | Mod: PBBFAC,PN | Performed by: OTOLARYNGOLOGY

## 2019-10-15 PROCEDURE — 99999 PR PBB SHADOW E&M-EST. PATIENT-LVL V: ICD-10-PCS | Mod: PBBFAC,,, | Performed by: OTOLARYNGOLOGY

## 2019-10-15 PROCEDURE — 99999 PR PBB SHADOW E&M-EST. PATIENT-LVL V: CPT | Mod: PBBFAC,,, | Performed by: OTOLARYNGOLOGY

## 2019-10-15 PROCEDURE — 99213 PR OFFICE/OUTPT VISIT, EST, LEVL III, 20-29 MIN: ICD-10-PCS | Mod: 25,S$PBB,, | Performed by: OTOLARYNGOLOGY

## 2019-10-15 NOTE — PROGRESS NOTES
"CC: follow up surgery    Subjective:      Sussy Costa is a 83 y.o. 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  She reports that she has been doing well since last visit approximately 2 months ago. She states that she has been using her nasal saline rinses and budesonide nasal nebulizer as instructed. She denies rhinorrhea, post-nasal drip, facial pain or pressure, epistaxis.     Objective:     BP (!) 140/69 (BP Location: Left arm, Patient Position: Sitting, BP Method: Large (Automatic))   Pulse 80   Temp 98.2 °F (36.8 °C) (Oral)   Ht 5' 6" (1.676 m)   Wt 63.2 kg (139 lb 5.3 oz)   BMI 22.49 kg/m²      Constitutional: Well appearing / communicating.  NAD.  Eyes: EOM I Bilaterally  Head/Face: Normocephalic.  Negative paranasal sinus pressure/tenderness.  Salivary glands WNL.  House Brackmann I Bilaterally.    Right Ear: External Auditory Canal WNL,TM w/o masses/lesions/perforations.  Auricle WNL.  Left Ear: External Auditory Canal WNL,TM w/o masses/lesions/perforations. Auricle WNL.  Nose: No gross nasal septal deviation. Inferior Turbinates 3+ bilaterally. No septal perforation. No masses/lesions. External nasal skin without masses/lesions.  Oral Cavity: Gingiva/lips WNL.  FOM Soft, no masses palpated. Oral Tongue mobile. Hard Palate WNL.   Oropharynx: BOT WNL. No masses/lesions noted. Tonsillar fossa/pharyngeal wall without lesions. Posterior oropharynx WNL.  Soft palate without masses. Midline uvula.   Neck/Lymphatic: No LAD I-VI bilaterally.  No thyromegaly.  No masses noted on exam.    Mirror laryngoscopy/nasopharyngoscopy: Active gag reflex.  Unable to perform.    Neuro/Psychiatric: AOx3.  Normal mood and affect.   Cardiovascular: Normal carotid pulses bilaterally, no increasing jugular venous distention noted at cervical region bilaterally.    Respiratory: Normal respiratory effort, no stridor, no " retractions noted.    Procedure    Nasal endoscopy performed.  See procedure note.        Data Reviewed    Pathology report indicated chronic inflammation with eosinophilia.       Assessment:     Doing well following bilateral endoscopic sinus surgery for recurrent CRS with nasal polyposis.       1. Other chronic sinusitis    2. Polyp of paranasal sinus    3. Non-seasonal allergic rhinitis, unspecified trigger         Plan:     Continue budesonide nasal nebulized solution and nasal saline rinses.  Continue Singulair and Xyzal.   Follow up in about 3 months (around 1/15/2020).     Rosangela Isabel MD

## 2019-10-15 NOTE — PROCEDURES
Procedures     PROCEDURE NOTE:  Nasal endoscopy   Preprocedure diagnosis:  Chronic sinusitis  Postprocedure diangosis:  Same  Complications:  None  Blood Loss:  None    Procedure in detail:  After verbal consent was obtained, the patient's nasal cavity was anesthesized using topical 1%lidocaine and Neosynepherine.  A rigid 0 degree endoscope was placed in first the right, then the left nasal cavity.  The inferior and middle turbinates were examined, and found to be boggy with edema  Bilaterally- slight polypoid appearance.   The middle meatus and maxillary antrum was also examined, and found to be patent bilaterally. Sphenoidotomy patent bilaterally.  No purulent drainage or masses seen.    The patient tolerated the procedure well and there were no complications.

## 2019-10-28 ENCOUNTER — TELEPHONE (OUTPATIENT)
Dept: INTERNAL MEDICINE | Facility: CLINIC | Age: 84
End: 2019-10-28

## 2019-10-28 RX ORDER — PREDNISONE 10 MG/1
TABLET ORAL
Qty: 15 TABLET | Refills: 0 | Status: SHIPPED | OUTPATIENT
Start: 2019-10-28 | End: 2020-09-28

## 2019-10-28 RX ORDER — ALBUTEROL SULFATE 90 UG/1
AEROSOL, METERED RESPIRATORY (INHALATION)
Qty: 18 G | Refills: 5 | Status: ON HOLD | OUTPATIENT
Start: 2019-10-28 | End: 2020-09-30 | Stop reason: SDUPTHER

## 2019-10-28 NOTE — TELEPHONE ENCOUNTER
----- Message from Flower Flores sent at 10/28/2019  1:50 PM CDT -----  Contact: self/ [phone]  Patient is having an asthma attack because she has a 30 foot tree on her fence and she need some prednisone.     [facility] [place] [address] [facility] [phone] (Phone)  [phone] (Fax)

## 2019-10-28 NOTE — TELEPHONE ENCOUNTER
Spoke with pt who advises that she has been using her inhalers and her nebulizer treatments.    She is requesting a refill on the Ventolin HFA (generic) inhaler.    Please send to her pharmacy as well.    Thanks.

## 2019-11-14 ENCOUNTER — APPOINTMENT (OUTPATIENT)
Dept: RADIOLOGY | Facility: CLINIC | Age: 84
End: 2019-11-14
Attending: INTERNAL MEDICINE
Payer: MEDICARE

## 2019-11-14 DIAGNOSIS — I10 HYPERTENSION, UNSPECIFIED TYPE: ICD-10-CM

## 2019-11-14 DIAGNOSIS — R80.9 TYPE 2 DIABETES MELLITUS WITH MICROALBUMINURIA, WITHOUT LONG-TERM CURRENT USE OF INSULIN: ICD-10-CM

## 2019-11-14 DIAGNOSIS — E11.29 TYPE 2 DIABETES MELLITUS WITH MICROALBUMINURIA, WITHOUT LONG-TERM CURRENT USE OF INSULIN: ICD-10-CM

## 2019-11-14 DIAGNOSIS — M81.0 SENILE OSTEOPOROSIS: ICD-10-CM

## 2019-11-14 PROCEDURE — 77080 DEXA BONE DENSITY SPINE HIP: ICD-10-PCS | Mod: 26,,, | Performed by: INTERNAL MEDICINE

## 2019-11-14 PROCEDURE — 77080 DXA BONE DENSITY AXIAL: CPT | Mod: 26,,, | Performed by: INTERNAL MEDICINE

## 2019-11-14 PROCEDURE — 77080 DXA BONE DENSITY AXIAL: CPT | Mod: TC,PO

## 2019-12-02 RX ORDER — MONTELUKAST SODIUM 10 MG/1
10 TABLET ORAL NIGHTLY
Qty: 90 TABLET | Refills: 0 | Status: SHIPPED | OUTPATIENT
Start: 2019-12-02 | End: 2020-03-09 | Stop reason: SDUPTHER

## 2019-12-02 RX ORDER — ATORVASTATIN CALCIUM 20 MG/1
TABLET, FILM COATED ORAL
Qty: 90 TABLET | Refills: 0 | Status: SHIPPED | OUTPATIENT
Start: 2019-12-02 | End: 2020-03-09 | Stop reason: SDUPTHER

## 2019-12-06 ENCOUNTER — TELEPHONE (OUTPATIENT)
Dept: ENDOCRINOLOGY | Facility: CLINIC | Age: 84
End: 2019-12-06

## 2019-12-06 NOTE — TELEPHONE ENCOUNTER
Please advise patient that I reviewed her bone density. She has had a slightly decline in her bone density at the spine, but the bone density remains stable at the hip. Since she hasn't fractured and her bone density is relatively stable I would recommend she continue the current medication for her bones (risedronate).

## 2019-12-09 NOTE — TELEPHONE ENCOUNTER
I have spoke to patient and let them know that Dr Martines has reviewed her bone density. She has had a slightly decline in her bone density at the spine, but the bone density remains stable at the hip. Since she hasn't fractured and her bone density is relatively stable Dr Martines would recommend she continue the current medication for her bones (risedronate).

## 2020-01-07 ENCOUNTER — OFFICE VISIT (OUTPATIENT)
Dept: URGENT CARE | Facility: CLINIC | Age: 85
End: 2020-01-07
Payer: MEDICARE

## 2020-01-07 VITALS
DIASTOLIC BLOOD PRESSURE: 83 MMHG | WEIGHT: 140 LBS | BODY MASS INDEX: 23.32 KG/M2 | RESPIRATION RATE: 18 BRPM | OXYGEN SATURATION: 97 % | HEIGHT: 65 IN | SYSTOLIC BLOOD PRESSURE: 182 MMHG | HEART RATE: 79 BPM | TEMPERATURE: 98 F

## 2020-01-07 DIAGNOSIS — J45.901 EXACERBATION OF ASTHMA, UNSPECIFIED ASTHMA SEVERITY, UNSPECIFIED WHETHER PERSISTENT: Primary | ICD-10-CM

## 2020-01-07 DIAGNOSIS — R06.2 WHEEZING: ICD-10-CM

## 2020-01-07 LAB
CTP QC/QA: YES
FLUAV AG NPH QL: NEGATIVE
FLUBV AG NPH QL: NEGATIVE

## 2020-01-07 PROCEDURE — 96372 PR INJECTION,THERAP/PROPH/DIAG2ST, IM OR SUBCUT: ICD-10-PCS | Mod: S$GLB,,, | Performed by: FAMILY MEDICINE

## 2020-01-07 PROCEDURE — 90662 FLU VACCINE - HIGH DOSE (65+) PRESERVATIVE FREE IM: ICD-10-PCS | Mod: S$GLB,,, | Performed by: FAMILY MEDICINE

## 2020-01-07 PROCEDURE — 87804 POCT INFLUENZA A/B: ICD-10-PCS | Mod: 59,QW,S$GLB, | Performed by: FAMILY MEDICINE

## 2020-01-07 PROCEDURE — 99214 OFFICE O/P EST MOD 30 MIN: CPT | Mod: 25,S$GLB,, | Performed by: FAMILY MEDICINE

## 2020-01-07 PROCEDURE — 90662 IIV NO PRSV INCREASED AG IM: CPT | Mod: S$GLB,,, | Performed by: FAMILY MEDICINE

## 2020-01-07 PROCEDURE — 94640 PR INHAL RX, AIRWAY OBST/DX SPUTUM INDUCT: ICD-10-PCS | Mod: 59,S$GLB,, | Performed by: FAMILY MEDICINE

## 2020-01-07 PROCEDURE — G0008 FLU VACCINE - HIGH DOSE (65+) PRESERVATIVE FREE IM: ICD-10-PCS | Mod: 59,S$GLB,, | Performed by: FAMILY MEDICINE

## 2020-01-07 PROCEDURE — 94640 AIRWAY INHALATION TREATMENT: CPT | Mod: 59,S$GLB,, | Performed by: FAMILY MEDICINE

## 2020-01-07 PROCEDURE — 87804 INFLUENZA ASSAY W/OPTIC: CPT | Mod: QW,S$GLB,, | Performed by: FAMILY MEDICINE

## 2020-01-07 PROCEDURE — G0008 ADMIN INFLUENZA VIRUS VAC: HCPCS | Mod: 59,S$GLB,, | Performed by: FAMILY MEDICINE

## 2020-01-07 PROCEDURE — 99214 PR OFFICE/OUTPT VISIT, EST, LEVL IV, 30-39 MIN: ICD-10-PCS | Mod: 25,S$GLB,, | Performed by: FAMILY MEDICINE

## 2020-01-07 PROCEDURE — 96372 THER/PROPH/DIAG INJ SC/IM: CPT | Mod: S$GLB,,, | Performed by: FAMILY MEDICINE

## 2020-01-07 RX ORDER — ALBUTEROL SULFATE 90 UG/1
2 AEROSOL, METERED RESPIRATORY (INHALATION) EVERY 6 HOURS PRN
Qty: 1 INHALER | Refills: 2 | Status: SHIPPED | OUTPATIENT
Start: 2020-01-07 | End: 2021-08-13 | Stop reason: SDUPTHER

## 2020-01-07 RX ORDER — ALBUTEROL SULFATE 1.25 MG/3ML
1.25 SOLUTION RESPIRATORY (INHALATION) EVERY 6 HOURS PRN
Qty: 1 BOX | Refills: 0 | Status: SHIPPED | OUTPATIENT
Start: 2020-01-07 | End: 2020-10-15 | Stop reason: SDUPTHER

## 2020-01-07 RX ORDER — PREDNISONE 20 MG/1
40 TABLET ORAL DAILY
Qty: 10 TABLET | Refills: 0 | Status: SHIPPED | OUTPATIENT
Start: 2020-01-08 | End: 2020-01-13

## 2020-01-07 RX ORDER — BETAMETHASONE SODIUM PHOSPHATE AND BETAMETHASONE ACETATE 3; 3 MG/ML; MG/ML
6 INJECTION, SUSPENSION INTRA-ARTICULAR; INTRALESIONAL; INTRAMUSCULAR; SOFT TISSUE
Status: COMPLETED | OUTPATIENT
Start: 2020-01-07 | End: 2020-01-07

## 2020-01-07 RX ORDER — ALBUTEROL SULFATE 0.83 MG/ML
2.5 SOLUTION RESPIRATORY (INHALATION)
Status: COMPLETED | OUTPATIENT
Start: 2020-01-07 | End: 2020-01-07

## 2020-01-07 RX ORDER — IPRATROPIUM BROMIDE 0.5 MG/2.5ML
0.5 SOLUTION RESPIRATORY (INHALATION)
Status: COMPLETED | OUTPATIENT
Start: 2020-01-07 | End: 2020-01-07

## 2020-01-07 RX ADMIN — IPRATROPIUM BROMIDE 0.5 MG: 0.5 SOLUTION RESPIRATORY (INHALATION) at 12:01

## 2020-01-07 RX ADMIN — BETAMETHASONE SODIUM PHOSPHATE AND BETAMETHASONE ACETATE 6 MG: 3; 3 INJECTION, SUSPENSION INTRA-ARTICULAR; INTRALESIONAL; INTRAMUSCULAR; SOFT TISSUE at 12:01

## 2020-01-07 RX ADMIN — ALBUTEROL SULFATE 2.5 MG: 0.83 SOLUTION RESPIRATORY (INHALATION) at 12:01

## 2020-01-07 NOTE — PATIENT INSTRUCTIONS
"  Asthma (Adult)  Asthma is a disease where the medium and  small air passages within the lung go into spasm and restrict the flow of air. Inflammation and swelling of the airways cause further restriction. During an acute asthma attack, these factors cause difficulty breathing, wheezing, cough and chest tightness.    An asthma attack can be triggered by many things. Common triggers include infections such as the common cold, bronchitis, pneumonia. Irritants such as smoke or pollutants in the air, emotional upset, and exercise can also trigger an attack. In many adults with asthma, allergies to dust, mold, pollen and animal dander can cause an asthma attack. Skipping doses of daily asthma medicine can also bring on an asthma attack.  Asthma can be controlled using the proper medicines prescribed by your healthcare provider and avoiding exposure to known triggers including allergens and irritants.  Home care  · Take prescribed medicine exactly at the times advised. If you need medicine such as from a hand held inhaler or aerosol breathing machine more than every 4 hours, contact your healthcare provider or seek immediate medical attention. If prescribed an antibiotic or prednisone, take all of the medicine as prescribed, even if you are feeling better after a few days.  · Do not smoke. Avoid being exposed to the smoke of others.  · Some people with asthma have worsening of their symptoms when they take aspirin and non-steroidal or fever-reducing medicines like ibuprofen and naproxen. Talk to your healthcare provider if you think this may apply to you.  Follow-up care  Follow up with your healthcare provider, or as advised. Always bring all of your current medicines to any appointments with your healthcare provider. Also bring a complete list of medications even those not taken for asthma. If you do not already have one, talk to your healthcare provider about developing a personalized "Asthma Action Plan."  A " pneumococcal (pneumonia) vaccine and yearly flu shot (every fall) are recommended. Ask your doctor about this.  When to seek medical advice  Call your healthcare provider right away if any of these occur:   · Increased wheezing or shortness of breath  · Need to use your inhalers more often than usual without relief  · Fever of 100.4ºF (38ºC) or higher, or as directed by your healthcare provider  · Coughing up lots of dark-colored or bloody sputum (mucus)  · Chest pain with each breath  · If you use a peak flow meter as part of an Asthma Action Plan, and you are still in the yellow zone (50% to 80%) 15 minutes after using inhaler medicine.  Call 911  Call 911 if any of the following occur  · Trouble walking or talking because of shortness of breath  · If you use a peak flow meter as part of an Asthma Action Plan and you are still in the red zone (less than 50%) 15 minutes after using inhaler medicine  · Lips or fingernails turning gray or blue  Date Last Reviewed: 12/2/2015  © 0788-7055 [employer]. [address] [place] [address], [place] [address]. All rights reserved. This information is not intended as a substitute for professional medical care. Always follow your healthcare professional's instructions.

## 2020-01-07 NOTE — PROGRESS NOTES
"Subjective:       Patient ID: Sussy Costa is a 84 y.o. female.    Vitals:  height is 5' 5" (1.651 m) and weight is 63.5 kg (140 lb). Her oral temperature is 98.4 °F (36.9 °C). Her blood pressure is 182/83 (abnormal) and her pulse is 79. Her respiration is 18 and oxygen saturation is 97%.     Chief Complaint: Cough    This is a 84 y.o. female who presents today with a chief complaint of   Cough, sore throat, chest tightness, post nasal drip. Pt has a hx of asthma. She is doing her nebulizer and mucinex. Started on 01/04/2020    Cough   This is a new problem. The current episode started in the past 7 days. The problem has been gradually worsening. The problem occurs every few minutes. The cough is productive of purulent sputum. Associated symptoms include postnasal drip, a sore throat, shortness of breath and wheezing. Pertinent negatives include no chills, ear pain, eye redness, fever, hemoptysis, myalgias or rash. Treatments tried: mucinex, nebulizer. The treatment provided mild relief. Her past medical history is significant for asthma, environmental allergies and pneumonia. There is no history of bronchitis.       Constitution: Negative for chills, sweating, fatigue and fever.   HENT: Positive for congestion, postnasal drip and sore throat. Negative for ear pain, sinus pain, sinus pressure and voice change.    Neck: Negative for painful lymph nodes.   Eyes: Negative for eye redness.   Respiratory: Positive for chest tightness, cough, shortness of breath, wheezing and asthma. Negative for sputum production, bloody sputum, COPD and stridor.    Gastrointestinal: Negative for nausea and vomiting.   Musculoskeletal: Negative for muscle ache.   Skin: Negative for rash.   Allergic/Immunologic: Positive for environmental allergies and asthma. Negative for seasonal allergies.   Hematologic/Lymphatic: Negative for swollen lymph nodes.       Objective:      Physical Exam   Constitutional: She appears well-developed and " well-nourished.   HENT:   Head: Normocephalic and atraumatic.   Eyes: Pupils are equal, round, and reactive to light. EOM are normal.   Neck: Normal range of motion. Neck supple.   Cardiovascular: Normal rate, regular rhythm and normal heart sounds.   Pulmonary/Chest: Effort normal. She has wheezes (diffuse improved after albuterol neb X 1).   Nursing note and vitals reviewed.        Assessment:       1. Exacerbation of asthma, unspecified asthma severity, unspecified whether persistent    2. Wheezing        Plan:         Exacerbation of asthma, unspecified asthma severity, unspecified whether persistent  -     POCT Influenza A/B  -     albuterol (ACCUNEB) 1.25 mg/3 mL Nebu; Take 3 mLs (1.25 mg total) by nebulization every 6 (six) hours as needed. Rescue  Dispense: 1 Box; Refill: 0    Wheezing  -     albuterol nebulizer solution 2.5 mg  -     ipratropium 0.02 % nebulizer solution 0.5 mg  -     betamethasone acetate-betamethasone sodium phosphate injection 6 mg  -     albuterol (ACCUNEB) 1.25 mg/3 mL Nebu; Take 3 mLs (1.25 mg total) by nebulization every 6 (six) hours as needed. Rescue  Dispense: 1 Box; Refill: 0  -     albuterol (PROAIR HFA) 90 mcg/actuation inhaler; Inhale 2 puffs into the lungs every 6 (six) hours as needed for Wheezing. Rescue  Dispense: 1 Inhaler; Refill: 2  -     predniSONE (DELTASONE) 20 MG tablet; Take 2 tablets (40 mg total) by mouth once daily. for 5 days  Dispense: 10 tablet; Refill: 0    recommend Mucinex OTC. RTC prn worsening symptoms

## 2020-01-13 ENCOUNTER — TELEPHONE (OUTPATIENT)
Dept: OTOLARYNGOLOGY | Facility: CLINIC | Age: 85
End: 2020-01-13

## 2020-01-13 NOTE — TELEPHONE ENCOUNTER
Spoke with patient and scheduled her appointment for Tuesday 01/28/2020 at 10:40AM. Patient voice understanding.

## 2020-01-13 NOTE — TELEPHONE ENCOUNTER
----- Message from Linda Morin sent at 1/13/2020 11:19 AM CST -----  Contact: [phone]/self   Patient is requesting to speak with nurse to reschedule her appointment on 01-15-20. Please call and advise.

## 2020-01-14 ENCOUNTER — HOSPITAL ENCOUNTER (OUTPATIENT)
Dept: RADIOLOGY | Facility: HOSPITAL | Age: 85
Discharge: HOME | End: 2020-01-14
Attending: INTERNAL MEDICINE
Payer: MEDICARE

## 2020-01-14 ENCOUNTER — OFFICE VISIT (OUTPATIENT)
Dept: INTERNAL MEDICINE | Facility: CLINIC | Age: 85
End: 2020-01-14
Payer: MEDICARE

## 2020-01-14 VITALS
WEIGHT: 136.25 LBS | RESPIRATION RATE: 22 BRPM | HEART RATE: 90 BPM | SYSTOLIC BLOOD PRESSURE: 135 MMHG | DIASTOLIC BLOOD PRESSURE: 77 MMHG | TEMPERATURE: 98 F | HEIGHT: 65 IN | BODY MASS INDEX: 22.7 KG/M2

## 2020-01-14 DIAGNOSIS — B96.89 ACUTE BACTERIAL BRONCHITIS: ICD-10-CM

## 2020-01-14 DIAGNOSIS — J45.909 ACUTE ASTHMATIC BRONCHITIS: Primary | ICD-10-CM

## 2020-01-14 DIAGNOSIS — J20.8 ACUTE BACTERIAL BRONCHITIS: ICD-10-CM

## 2020-01-14 DIAGNOSIS — R80.9 TYPE 2 DIABETES MELLITUS WITH MICROALBUMINURIA, WITHOUT LONG-TERM CURRENT USE OF INSULIN: ICD-10-CM

## 2020-01-14 DIAGNOSIS — I15.2 HYPERTENSION ASSOCIATED WITH DIABETES: ICD-10-CM

## 2020-01-14 DIAGNOSIS — J45.909 ACUTE ASTHMATIC BRONCHITIS: ICD-10-CM

## 2020-01-14 DIAGNOSIS — E11.29 TYPE 2 DIABETES MELLITUS WITH MICROALBUMINURIA, WITHOUT LONG-TERM CURRENT USE OF INSULIN: ICD-10-CM

## 2020-01-14 DIAGNOSIS — E11.59 HYPERTENSION ASSOCIATED WITH DIABETES: ICD-10-CM

## 2020-01-14 PROCEDURE — 1159F PR MEDICATION LIST DOCUMENTED IN MEDICAL RECORD: ICD-10-PCS | Mod: ,,, | Performed by: INTERNAL MEDICINE

## 2020-01-14 PROCEDURE — 1125F AMNT PAIN NOTED PAIN PRSNT: CPT | Mod: ,,, | Performed by: INTERNAL MEDICINE

## 2020-01-14 PROCEDURE — 99214 OFFICE O/P EST MOD 30 MIN: CPT | Mod: S$PBB,,, | Performed by: INTERNAL MEDICINE

## 2020-01-14 PROCEDURE — 99999 PR PBB SHADOW E&M-EST. PATIENT-LVL III: CPT | Mod: PBBFAC,,, | Performed by: INTERNAL MEDICINE

## 2020-01-14 PROCEDURE — 71046 X-RAY EXAM CHEST 2 VIEWS: CPT | Mod: TC,PO

## 2020-01-14 PROCEDURE — 99213 OFFICE O/P EST LOW 20 MIN: CPT | Mod: PBBFAC,25,PO | Performed by: INTERNAL MEDICINE

## 2020-01-14 PROCEDURE — 71046 XR CHEST PA AND LATERAL: ICD-10-PCS | Mod: 26,,, | Performed by: RADIOLOGY

## 2020-01-14 PROCEDURE — 99999 PR PBB SHADOW E&M-EST. PATIENT-LVL III: ICD-10-PCS | Mod: PBBFAC,,, | Performed by: INTERNAL MEDICINE

## 2020-01-14 PROCEDURE — 1125F PR PAIN SEVERITY QUANTIFIED, PAIN PRESENT: ICD-10-PCS | Mod: ,,, | Performed by: INTERNAL MEDICINE

## 2020-01-14 PROCEDURE — 1159F MED LIST DOCD IN RCRD: CPT | Mod: ,,, | Performed by: INTERNAL MEDICINE

## 2020-01-14 PROCEDURE — 99214 PR OFFICE/OUTPT VISIT, EST, LEVL IV, 30-39 MIN: ICD-10-PCS | Mod: S$PBB,,, | Performed by: INTERNAL MEDICINE

## 2020-01-14 PROCEDURE — 71046 X-RAY EXAM CHEST 2 VIEWS: CPT | Mod: 26,,, | Performed by: RADIOLOGY

## 2020-01-14 RX ORDER — LEVOFLOXACIN 500 MG/1
500 TABLET, FILM COATED ORAL DAILY
Qty: 7 TABLET | Refills: 0 | Status: ON HOLD | OUTPATIENT
Start: 2020-01-14 | End: 2020-09-30

## 2020-01-14 RX ORDER — FLUTICASONE PROPIONATE AND SALMETEROL 500; 50 UG/1; UG/1
1 POWDER RESPIRATORY (INHALATION) 2 TIMES DAILY
Qty: 180 EACH | Refills: 3 | Status: SHIPPED | OUTPATIENT
Start: 2020-01-14 | End: 2021-02-10

## 2020-01-16 NOTE — PROGRESS NOTES
History of present illness:  84-year-old lady with hypertension, diabetes mellitus, dyslipidemia asthma in today with continued issues with respiratory complaints.  The patient developed what appears to be in asthma exacerbation the last couple of weeks.  She was seen at urgent care on January 7th treated with 5 day course of oral steroids, respiratory treatments.  She reports using her home nebulizer but is not using a maintenance inhaler regimen currently.  She reports she continues to have moderate wheezing and congestion. No fever no chills.  No recent foreign travel.      Current medications:  All medications are noted and reviewed in the electronic medical record medication list.    Review of systems:  Constitutional:  No fever no chills no generalized body aches.  Respiratory:  Continues with cough mildly productive slightly discolored phlegm.  Decrease exertional capacity with wheezing.  No chest pain.  No hemoptysis.  Cardiovascular:  No chest pain no palpitations no syncope no presyncope no claudication.  GI:  No nausea vomiting abdominal pain or diarrhea no change in bowel habits.    Past medical history, past surgical history, family medical history social history is are all noted and reviewed the electronic medical record history sections.    Physical examination:  General:  Alert pleasant appropriately groomed lady no acute distress.  Vital signs:  Blood pressure and other vitals noted reviewed.  Eyes:  Sclerae white not icteric.  HEENT:  Mouth and pharynx normal.  No thrush.  Neck supple no masses no thyromegaly.  Lungs:  Coarse generalized expiratory wheezes.  Mild prolongation of expiratory phase.  No rales.  Cardiovascular:  Regular rhythm. No murmur click or gallop. No JVD. No significant peripheral extremity edema.  Mental status:  Alert oriented affect mood all appropriate.    Data:  Reviewed the recent urgent care visit.      Impression:  Acute asthmatic bacterial bronchitis.  Hypertension  controlled.  Diabetes has been well controlled.    Plan:  Levaquin 500 mg daily 7 days.  Restart Advair Diskus.  Continue home nebulizers.  Guaifenesin.  Chest x-ray today.  Advised over the next week if without marked improvement or failure to return to baseline.

## 2020-01-23 ENCOUNTER — PATIENT OUTREACH (OUTPATIENT)
Dept: ADMINISTRATIVE | Facility: OTHER | Age: 85
End: 2020-01-23

## 2020-01-27 RX ORDER — IRBESARTAN 300 MG/1
300 TABLET ORAL NIGHTLY
Qty: 90 TABLET | Refills: 3 | Status: SHIPPED | OUTPATIENT
Start: 2020-01-27 | End: 2021-02-05

## 2020-01-28 ENCOUNTER — OFFICE VISIT (OUTPATIENT)
Dept: OTOLARYNGOLOGY | Facility: CLINIC | Age: 85
End: 2020-01-28
Payer: MEDICARE

## 2020-01-28 VITALS
HEART RATE: 81 BPM | DIASTOLIC BLOOD PRESSURE: 78 MMHG | HEIGHT: 65 IN | BODY MASS INDEX: 23.16 KG/M2 | TEMPERATURE: 98 F | WEIGHT: 139 LBS | SYSTOLIC BLOOD PRESSURE: 166 MMHG

## 2020-01-28 DIAGNOSIS — J30.89 NON-SEASONAL ALLERGIC RHINITIS, UNSPECIFIED TRIGGER: ICD-10-CM

## 2020-01-28 DIAGNOSIS — J32.8 OTHER CHRONIC SINUSITIS: Primary | ICD-10-CM

## 2020-01-28 DIAGNOSIS — J33.8 POLYP OF PARANASAL SINUS: ICD-10-CM

## 2020-01-28 PROCEDURE — 99999 PR PBB SHADOW E&M-EST. PATIENT-LVL III: CPT | Mod: PBBFAC,,, | Performed by: OTOLARYNGOLOGY

## 2020-01-28 PROCEDURE — 31231 PR NASAL ENDOSCOPY, DX: ICD-10-PCS | Mod: S$PBB,,, | Performed by: OTOLARYNGOLOGY

## 2020-01-28 PROCEDURE — 99213 OFFICE O/P EST LOW 20 MIN: CPT | Mod: PBBFAC,PN,25 | Performed by: OTOLARYNGOLOGY

## 2020-01-28 PROCEDURE — 99213 OFFICE O/P EST LOW 20 MIN: CPT | Mod: 25,S$PBB,, | Performed by: OTOLARYNGOLOGY

## 2020-01-28 PROCEDURE — 1126F PR PAIN SEVERITY QUANTIFIED, NO PAIN PRESENT: ICD-10-PCS | Mod: ,,, | Performed by: OTOLARYNGOLOGY

## 2020-01-28 PROCEDURE — 99999 PR PBB SHADOW E&M-EST. PATIENT-LVL III: ICD-10-PCS | Mod: PBBFAC,,, | Performed by: OTOLARYNGOLOGY

## 2020-01-28 PROCEDURE — 1126F AMNT PAIN NOTED NONE PRSNT: CPT | Mod: ,,, | Performed by: OTOLARYNGOLOGY

## 2020-01-28 PROCEDURE — 1159F PR MEDICATION LIST DOCUMENTED IN MEDICAL RECORD: ICD-10-PCS | Mod: ,,, | Performed by: OTOLARYNGOLOGY

## 2020-01-28 PROCEDURE — 31231 NASAL ENDOSCOPY DX: CPT | Mod: PBBFAC,PN | Performed by: OTOLARYNGOLOGY

## 2020-01-28 PROCEDURE — 31231 NASAL ENDOSCOPY DX: CPT | Mod: S$PBB,,, | Performed by: OTOLARYNGOLOGY

## 2020-01-28 PROCEDURE — 1159F MED LIST DOCD IN RCRD: CPT | Mod: ,,, | Performed by: OTOLARYNGOLOGY

## 2020-01-28 PROCEDURE — 99213 PR OFFICE/OUTPT VISIT, EST, LEVL III, 20-29 MIN: ICD-10-PCS | Mod: 25,S$PBB,, | Performed by: OTOLARYNGOLOGY

## 2020-01-28 NOTE — PROGRESS NOTES
"CC: follow up surgery    Subjective:      Sussy Costa is a 84 y.o. 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 She reports that she has been doing well since last visit approximately 3 months ago. She states that she has been using her nasal saline rinses and budesonide nasal nebulizer as instructed. She feels this is helpful in keeping her sinuses clear. She denies rhinorrhea, post-nasal drip, facial pain or pressure, epistaxis.     Objective:     BP (!) 166/78 (BP Location: Left arm, Patient Position: Sitting, BP Method: Large (Automatic))   Pulse 81   Temp 98.1 °F (36.7 °C) (Oral)   Ht 5' 5" (1.651 m)   Wt 63 kg (139 lb)   BMI 23.13 kg/m²      Constitutional: Well appearing / communicating.  NAD.  Eyes: EOM I Bilaterally  Head/Face: Normocephalic.  Negative paranasal sinus pressure/tenderness.  Salivary glands WNL.  House Brackmann I Bilaterally.    Right Ear: External Auditory Canal WNL,TM w/o masses/lesions/perforations.  Auricle WNL.  Left Ear: External Auditory Canal WNL,TM w/o masses/lesions/perforations. Auricle WNL.  Nose: No gross nasal septal deviation. Inferior Turbinates 3+ bilaterally. No septal perforation. No masses/lesions. External nasal skin without masses/lesions.  Oral Cavity: Gingiva/lips WNL.  FOM Soft, no masses palpated. Oral Tongue mobile. Hard Palate WNL.   Oropharynx: BOT WNL. No masses/lesions noted. Tonsillar fossa/pharyngeal wall without lesions. Posterior oropharynx WNL.  Soft palate without masses. Midline uvula.   Neck/Lymphatic: No LAD I-VI bilaterally.  No thyromegaly.  No masses noted on exam.    Mirror laryngoscopy/nasopharyngoscopy: Active gag reflex.  Unable to perform.    Neuro/Psychiatric: AOx3.  Normal mood and affect.   Cardiovascular: Normal carotid pulses bilaterally, no increasing jugular venous distention noted at cervical region bilaterally.    Respiratory: " Normal respiratory effort, no stridor, no retractions noted.    Procedure    Nasal endoscopy performed.  See procedure note.        Data Reviewed    Pathology report indicated chronic inflammation with eosinophilia.       Assessment:     Doing well following bilateral endoscopic sinus surgery for recurrent CRS with nasal polyposis.       1. Other chronic sinusitis    2. Polyp of paranasal sinus    3. Non-seasonal allergic rhinitis, unspecified trigger         Plan:     Continue budesonide and mupirocin nasal nebulized solution and nasal saline rinses.  Continue Singulair and Xyzal.   Follow up in about 4 months (around 5/28/2020).     Rosangela Isabel MD

## 2020-02-26 ENCOUNTER — TELEPHONE (OUTPATIENT)
Dept: OTOLARYNGOLOGY | Facility: CLINIC | Age: 85
End: 2020-02-26

## 2020-02-26 NOTE — TELEPHONE ENCOUNTER
----- Message from Maris Hannon sent at 2/26/2020 11:45 AM CST -----  Contact: [phone]/self  Patient calling to speak with you about medication   Please advise

## 2020-02-26 NOTE — TELEPHONE ENCOUNTER
Spoke with patient she states insurance denied medication that was ordered with [facility]. Discuss with patient I will follow up with Dr Colin about possibly changing medication to something that insurance will approve. Patient verbalized understanding.

## 2020-03-04 ENCOUNTER — TELEPHONE (OUTPATIENT)
Dept: OTOLARYNGOLOGY | Facility: CLINIC | Age: 85
End: 2020-03-04

## 2020-03-04 NOTE — TELEPHONE ENCOUNTER
----- Message from Celia Dahl sent at 3/4/2020 11:43 AM CST -----  Contact: self [phone]  Patient called in requesting to speak with you. Patient prefers to speak with a nurse. Please advise.

## 2020-03-04 NOTE — TELEPHONE ENCOUNTER
Spoke with patient she was notified will fax over a new prescription to [facility] due to old prescription denied by her insurance. Patient verbalized understanding with no questions

## 2020-03-09 RX ORDER — ATORVASTATIN CALCIUM 20 MG/1
TABLET, FILM COATED ORAL
Qty: 90 TABLET | Refills: 0 | Status: SHIPPED | OUTPATIENT
Start: 2020-03-09 | End: 2020-06-06 | Stop reason: SDUPTHER

## 2020-03-09 RX ORDER — MONTELUKAST SODIUM 10 MG/1
10 TABLET ORAL NIGHTLY
Qty: 90 TABLET | Refills: 0 | Status: SHIPPED | OUTPATIENT
Start: 2020-03-09 | End: 2020-06-06 | Stop reason: SDUPTHER

## 2020-03-31 RX ORDER — GLIMEPIRIDE 2 MG/1
TABLET ORAL
Qty: 90 TABLET | Refills: 3 | Status: SHIPPED | OUTPATIENT
Start: 2020-03-31 | End: 2020-10-20 | Stop reason: SDUPTHER

## 2020-05-25 ENCOUNTER — TELEPHONE (OUTPATIENT)
Dept: OTOLARYNGOLOGY | Facility: CLINIC | Age: 85
End: 2020-05-25

## 2020-05-25 DIAGNOSIS — Z01.812 PRE-PROCEDURE LAB EXAM: Primary | ICD-10-CM

## 2020-05-25 NOTE — TELEPHONE ENCOUNTER
Spoke with  and explained she would need a Covid test 48 hours before her office visit with . Scheduled her Covid test at [facility] for Wednesday 05/27/2020 at 9:10AM. Patient thanked me and Patient voice understanding.

## 2020-05-27 ENCOUNTER — LAB VISIT (OUTPATIENT)
Dept: FAMILY MEDICINE | Facility: CLINIC | Age: 85
End: 2020-05-27
Payer: MEDICARE

## 2020-05-27 ENCOUNTER — PATIENT OUTREACH (OUTPATIENT)
Dept: ADMINISTRATIVE | Facility: OTHER | Age: 85
End: 2020-05-27

## 2020-05-27 DIAGNOSIS — E11.9 DIABETES MELLITUS WITHOUT COMPLICATION: Primary | ICD-10-CM

## 2020-05-27 DIAGNOSIS — Z01.812 PRE-PROCEDURE LAB EXAM: ICD-10-CM

## 2020-05-27 PROCEDURE — U0003 INFECTIOUS AGENT DETECTION BY NUCLEIC ACID (DNA OR RNA); SEVERE ACUTE RESPIRATORY SYNDROME CORONAVIRUS 2 (SARS-COV-2) (CORONAVIRUS DISEASE [COVID-19]), AMPLIFIED PROBE TECHNIQUE, MAKING USE OF HIGH THROUGHPUT TECHNOLOGIES AS DESCRIBED BY CMS-2020-01-R: HCPCS

## 2020-05-28 LAB — SARS-COV-2 RNA RESP QL NAA+PROBE: NOT DETECTED

## 2020-05-29 ENCOUNTER — OFFICE VISIT (OUTPATIENT)
Dept: OTOLARYNGOLOGY | Facility: CLINIC | Age: 85
End: 2020-05-29
Payer: MEDICARE

## 2020-05-29 VITALS
HEART RATE: 77 BPM | TEMPERATURE: 98 F | DIASTOLIC BLOOD PRESSURE: 92 MMHG | WEIGHT: 141.56 LBS | HEIGHT: 65 IN | BODY MASS INDEX: 23.58 KG/M2 | SYSTOLIC BLOOD PRESSURE: 182 MMHG

## 2020-05-29 DIAGNOSIS — J32.8 OTHER CHRONIC SINUSITIS: Primary | ICD-10-CM

## 2020-05-29 DIAGNOSIS — J30.89 NON-SEASONAL ALLERGIC RHINITIS, UNSPECIFIED TRIGGER: ICD-10-CM

## 2020-05-29 DIAGNOSIS — J33.8 POLYP OF PARANASAL SINUS: ICD-10-CM

## 2020-05-29 PROCEDURE — 99213 PR OFFICE/OUTPT VISIT, EST, LEVL III, 20-29 MIN: ICD-10-PCS | Mod: 25,S$PBB,, | Performed by: OTOLARYNGOLOGY

## 2020-05-29 PROCEDURE — 31231 PR NASAL ENDOSCOPY, DX: ICD-10-PCS | Mod: S$PBB,,, | Performed by: OTOLARYNGOLOGY

## 2020-05-29 PROCEDURE — 99999 PR PBB SHADOW E&M-EST. PATIENT-LVL III: ICD-10-PCS | Mod: PBBFAC,,, | Performed by: OTOLARYNGOLOGY

## 2020-05-29 PROCEDURE — 99213 OFFICE O/P EST LOW 20 MIN: CPT | Mod: PBBFAC,PN,25 | Performed by: OTOLARYNGOLOGY

## 2020-05-29 PROCEDURE — 99999 PR PBB SHADOW E&M-EST. PATIENT-LVL III: CPT | Mod: PBBFAC,,, | Performed by: OTOLARYNGOLOGY

## 2020-05-29 PROCEDURE — 31231 NASAL ENDOSCOPY DX: CPT | Mod: S$PBB,,, | Performed by: OTOLARYNGOLOGY

## 2020-05-29 PROCEDURE — 99213 OFFICE O/P EST LOW 20 MIN: CPT | Mod: 25,S$PBB,, | Performed by: OTOLARYNGOLOGY

## 2020-05-29 PROCEDURE — 31231 NASAL ENDOSCOPY DX: CPT | Mod: PBBFAC,PN | Performed by: OTOLARYNGOLOGY

## 2020-05-29 RX ORDER — BUDESONIDE 1 MG/2ML
INHALANT ORAL
COMMUNITY
Start: 2020-03-06 | End: 2020-09-28

## 2020-05-29 RX ORDER — NITROFURANTOIN MACROCRYSTALS 25 MG/1
CAPSULE ORAL
Status: ON HOLD | COMMUNITY
Start: 2020-03-06 | End: 2020-09-30 | Stop reason: CLARIF

## 2020-05-29 NOTE — PROCEDURES
Procedures       PROCEDURE NOTE:  Nasal endoscopy   Preprocedure diagnosis:  Chronic sinusitis  Postprocedure diangosis:  Same  Complications:  None  Blood Loss:  None    Procedure in detail:  After verbal consent was obtained, the patient's nasal cavity was anesthesized using topical 1%lidocaine and Neosynepherine.  A rigid 0 degree endoscope was placed in first the right, then the left nasal cavity.  The inferior and middle turbinates were examined, and found to be boggy with edema bilaterally.   The middle meatus and maxillary antrum was also examined, and found to be patent bilaterally. Sphenoidotomy patent bilaterally. No evidence of brittany polyps.  No purulent drainage or masses seen.    The patient tolerated the procedure well and there were no complications.

## 2020-05-29 NOTE — PROGRESS NOTES
"CC: follow up surgery    Subjective:      Sussy Costa is a 84 y.o. 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 She reports that she has been doing well since last visit approximately 4 months ago. She states that she has been using her nasal saline rinses with budesonide as instructed.  She denies rhinorrhea, post-nasal drip, facial pain or pressure, epistaxis. She feels the budesonide saline rinses help to keep her sinus symptoms under control.     Objective:     BP (!) 182/92 (BP Location: Right arm, Patient Position: Sitting, BP Method: Large (Automatic))   Pulse 77   Temp 98.1 °F (36.7 °C) (Oral)   Ht 5' 5" (1.651 m)   Wt 64.2 kg (141 lb 8.6 oz)   BMI 23.55 kg/m²      Constitutional: Well appearing / communicating.  NAD.  Eyes: EOM I Bilaterally  Head/Face: Normocephalic.  Negative paranasal sinus pressure/tenderness.  Salivary glands WNL.  House Brackmann I Bilaterally.    Right Ear: External Auditory Canal WNL,TM w/o masses/lesions/perforations.  Auricle WNL.  Left Ear: External Auditory Canal WNL,TM w/o masses/lesions/perforations. Auricle WNL.  Nose: No gross nasal septal deviation. Inferior Turbinates 3+ bilaterally. No septal perforation. No masses/lesions. External nasal skin without masses/lesions.  Oral Cavity: Gingiva/lips WNL.  FOM Soft, no masses palpated. Oral Tongue mobile. Hard Palate WNL.   Oropharynx: BOT WNL. No masses/lesions noted. Tonsillar fossa/pharyngeal wall without lesions. Posterior oropharynx WNL.  Soft palate without masses. Midline uvula.   Neck/Lymphatic: No LAD I-VI bilaterally.  No thyromegaly.  No masses noted on exam.    Mirror laryngoscopy/nasopharyngoscopy: Active gag reflex.  Unable to perform.    Neuro/Psychiatric: AOx3.  Normal mood and affect.   Cardiovascular: Normal carotid pulses bilaterally, no increasing jugular venous distention noted at cervical region " bilaterally.    Respiratory: Normal respiratory effort, no stridor, no retractions noted.    Procedure    Nasal endoscopy performed.  See procedure note.        Data Reviewed    Pathology report indicated chronic inflammation with eosinophilia.       Assessment:     Doing well following bilateral endoscopic sinus surgery for recurrent CRS with nasal polyposis.       1. Other chronic sinusitis    2. Polyp of paranasal sinus    3. Non-seasonal allergic rhinitis, unspecified trigger         Plan:     Continue budesonide and mupirocin nasal nebulized solution and nasal saline rinses(refills provided).   Continue Singulair and Xyzal.     Follow up in about 4 months (around 9/29/2020).     Rosangela Isabel MD

## 2020-06-03 ENCOUNTER — TELEPHONE (OUTPATIENT)
Dept: OTOLARYNGOLOGY | Facility: CLINIC | Age: 85
End: 2020-06-03

## 2020-06-03 NOTE — TELEPHONE ENCOUNTER
Attempted to call patient to discuss [facility] medication. No answer. Not able to leave VM. Will call back

## 2020-06-04 ENCOUNTER — TELEPHONE (OUTPATIENT)
Dept: OTOLARYNGOLOGY | Facility: CLINIC | Age: 85
End: 2020-06-04

## 2020-06-04 NOTE — TELEPHONE ENCOUNTER
Spoke with patient she was notified the medication to use that Dr Colin prescribed from [facility] is the Budesonide, Methylprednisolone, and Betamethasone. Discussed with her this is the compound medication she sent to [facility]. Patient verbalized understanding with no questions

## 2020-06-08 RX ORDER — MONTELUKAST SODIUM 10 MG/1
10 TABLET ORAL NIGHTLY
Qty: 90 TABLET | Refills: 0 | Status: SHIPPED | OUTPATIENT
Start: 2020-06-08 | End: 2020-09-07 | Stop reason: SDUPTHER

## 2020-06-08 RX ORDER — ATORVASTATIN CALCIUM 20 MG/1
TABLET, FILM COATED ORAL
Qty: 90 TABLET | Refills: 0 | Status: SHIPPED | OUTPATIENT
Start: 2020-06-08 | End: 2020-09-07 | Stop reason: SDUPTHER

## 2020-09-08 RX ORDER — MONTELUKAST SODIUM 10 MG/1
10 TABLET ORAL NIGHTLY
Qty: 90 TABLET | Refills: 0 | Status: SHIPPED | OUTPATIENT
Start: 2020-09-08 | End: 2020-09-08

## 2020-09-08 RX ORDER — MONTELUKAST SODIUM 10 MG/1
10 TABLET ORAL NIGHTLY
Qty: 90 TABLET | Refills: 0 | Status: SHIPPED | OUTPATIENT
Start: 2020-09-08 | End: 2020-09-09 | Stop reason: SDUPTHER

## 2020-09-08 RX ORDER — ATORVASTATIN CALCIUM 20 MG/1
TABLET, FILM COATED ORAL
Qty: 90 TABLET | Refills: 0 | Status: SHIPPED | OUTPATIENT
Start: 2020-09-08 | End: 2020-09-08

## 2020-09-08 RX ORDER — ATORVASTATIN CALCIUM 20 MG/1
TABLET, FILM COATED ORAL
Qty: 90 TABLET | Refills: 0 | Status: SHIPPED | OUTPATIENT
Start: 2020-09-08 | End: 2020-09-09 | Stop reason: SDUPTHER

## 2020-09-09 RX ORDER — ATORVASTATIN CALCIUM 20 MG/1
TABLET, FILM COATED ORAL
Qty: 90 TABLET | Refills: 0 | Status: SHIPPED | OUTPATIENT
Start: 2020-09-09 | End: 2020-12-08 | Stop reason: SDUPTHER

## 2020-09-09 RX ORDER — MONTELUKAST SODIUM 10 MG/1
10 TABLET ORAL NIGHTLY
Qty: 90 TABLET | Refills: 0 | Status: SHIPPED | OUTPATIENT
Start: 2020-09-09 | End: 2020-12-08 | Stop reason: SDUPTHER

## 2020-09-21 DIAGNOSIS — Z01.812 PRE-PROCEDURE LAB EXAM: ICD-10-CM

## 2020-09-25 ENCOUNTER — TELEPHONE (OUTPATIENT)
Dept: OTOLARYNGOLOGY | Facility: CLINIC | Age: 85
End: 2020-09-25

## 2020-09-25 NOTE — TELEPHONE ENCOUNTER
----- Message from Maris Hannon sent at 9/25/2020  8:50 AM CDT -----  Contact: patient/  [phone]  Sussy Costa calling requesting to speak with you regarding rescheduling her appointment and she would like to speak with you regarding getting something called in for her asthma.  Patient breathing very heavy on the phone advised her to the nearest ER she refused and also offered her to be connected to the Nurse On Call she refused said she would like to speak with Dr Cristi gonzales first.  Tried reaching out to nurse Desai no reply

## 2020-09-25 NOTE — TELEPHONE ENCOUNTER
Spoke with patient she states she is needing to cancel her appointment due to her  admitted into hospital. Patient will call back to reschedule. Also she is asking for medication for her asthma. Discussed with patient Dr Colin doesn't typically prescribe meds for this and it's out of her practice. Told patient I seen she sees Dr Casillas for her asthma medication and strongly advised patient to call him for a refill. She states she will call the office and call me back when ready to reschedule her appt. She thanked me kindly for returning her call.

## 2020-09-28 ENCOUNTER — OFFICE VISIT (OUTPATIENT)
Dept: URGENT CARE | Facility: CLINIC | Age: 85
End: 2020-09-28
Payer: MEDICARE

## 2020-09-28 ENCOUNTER — HOSPITAL ENCOUNTER (INPATIENT)
Facility: HOSPITAL | Age: 85
LOS: 4 days | Discharge: HOME | DRG: 193 | End: 2020-10-02
Attending: EMERGENCY MEDICINE | Admitting: STUDENT IN AN ORGANIZED HEALTH CARE EDUCATION/TRAINING PROGRAM
Payer: MEDICARE

## 2020-09-28 ENCOUNTER — TELEPHONE (OUTPATIENT)
Dept: OTOLARYNGOLOGY | Facility: CLINIC | Age: 85
End: 2020-09-28

## 2020-09-28 VITALS
WEIGHT: 141 LBS | HEART RATE: 103 BPM | HEIGHT: 65 IN | BODY MASS INDEX: 23.49 KG/M2 | OXYGEN SATURATION: 91 % | RESPIRATION RATE: 22 BRPM | SYSTOLIC BLOOD PRESSURE: 167 MMHG | DIASTOLIC BLOOD PRESSURE: 90 MMHG | TEMPERATURE: 98 F

## 2020-09-28 DIAGNOSIS — R06.02 SHORTNESS OF BREATH: ICD-10-CM

## 2020-09-28 DIAGNOSIS — J18.9 PNEUMONIA OF RIGHT MIDDLE LOBE DUE TO INFECTIOUS ORGANISM: Primary | ICD-10-CM

## 2020-09-28 DIAGNOSIS — R09.02 HYPOXIA: ICD-10-CM

## 2020-09-28 DIAGNOSIS — J45.901 EXACERBATION OF ASTHMA, UNSPECIFIED ASTHMA SEVERITY, UNSPECIFIED WHETHER PERSISTENT: ICD-10-CM

## 2020-09-28 DIAGNOSIS — I48.0 PAROXYSMAL A-FIB: ICD-10-CM

## 2020-09-28 PROBLEM — I48.91 NEW ONSET A-FIB: Status: ACTIVE | Noted: 2020-09-28

## 2020-09-28 LAB
ALBUMIN SERPL BCP-MCNC: 2.5 G/DL (ref 3.5–5.2)
ALP SERPL-CCNC: 60 U/L (ref 55–135)
ALT SERPL W/O P-5'-P-CCNC: 18 U/L (ref 10–44)
ANION GAP SERPL CALC-SCNC: 8 MMOL/L (ref 8–16)
AST SERPL-CCNC: 11 U/L (ref 10–40)
BASOPHILS # BLD AUTO: 0.04 K/UL (ref 0–0.2)
BASOPHILS NFR BLD: 0.3 % (ref 0–1.9)
BILIRUB SERPL-MCNC: 0.6 MG/DL (ref 0.1–1)
BNP SERPL-MCNC: 79 PG/ML (ref 0–99)
BUN SERPL-MCNC: 12 MG/DL (ref 8–23)
CALCIUM SERPL-MCNC: 7.7 MG/DL (ref 8.7–10.5)
CHLORIDE SERPL-SCNC: 112 MMOL/L (ref 95–110)
CO2 SERPL-SCNC: 22 MMOL/L (ref 23–29)
CREAT SERPL-MCNC: 0.8 MG/DL (ref 0.5–1.4)
CTP QC/QA: YES
DIFFERENTIAL METHOD: ABNORMAL
EOSINOPHIL # BLD AUTO: 0 K/UL (ref 0–0.5)
EOSINOPHIL NFR BLD: 0 % (ref 0–8)
ERYTHROCYTE [DISTWIDTH] IN BLOOD BY AUTOMATED COUNT: 12.8 % (ref 11.5–14.5)
EST. GFR  (AFRICAN AMERICAN): >60 ML/MIN/1.73 M^2
EST. GFR  (NON AFRICAN AMERICAN): >60 ML/MIN/1.73 M^2
ESTIMATED AVG GLUCOSE: 220 MG/DL (ref 68–131)
GLUCOSE SERPL-MCNC: 285 MG/DL (ref 70–110)
HBA1C MFR BLD HPLC: 9.3 % (ref 4–5.6)
HCT VFR BLD AUTO: 41.1 % (ref 37–48.5)
HGB BLD-MCNC: 13.1 G/DL (ref 12–16)
IMM GRANULOCYTES # BLD AUTO: 0.09 K/UL (ref 0–0.04)
IMM GRANULOCYTES NFR BLD AUTO: 0.7 % (ref 0–0.5)
LACTATE SERPL-SCNC: 2.2 MMOL/L (ref 0.5–2.2)
LYMPHOCYTES # BLD AUTO: 0.6 K/UL (ref 1–4.8)
LYMPHOCYTES NFR BLD: 4.3 % (ref 18–48)
MAGNESIUM SERPL-MCNC: 1.5 MG/DL (ref 1.6–2.6)
MCH RBC QN AUTO: 30.1 PG (ref 27–31)
MCHC RBC AUTO-ENTMCNC: 31.9 G/DL (ref 32–36)
MCV RBC AUTO: 95 FL (ref 82–98)
MONOCYTES # BLD AUTO: 0.1 K/UL (ref 0.3–1)
MONOCYTES NFR BLD: 1 % (ref 4–15)
NEUTROPHILS # BLD AUTO: 12.3 K/UL (ref 1.8–7.7)
NEUTROPHILS NFR BLD: 93.7 % (ref 38–73)
NRBC BLD-RTO: 0 /100 WBC
PHOSPHATE SERPL-MCNC: 2 MG/DL (ref 2.7–4.5)
PLATELET # BLD AUTO: 296 K/UL (ref 150–350)
PMV BLD AUTO: 11.5 FL (ref 9.2–12.9)
POCT GLUCOSE: 278 MG/DL (ref 70–110)
POTASSIUM SERPL-SCNC: 3.1 MMOL/L (ref 3.5–5.1)
PROT SERPL-MCNC: 5.4 G/DL (ref 6–8.4)
RBC # BLD AUTO: 4.35 M/UL (ref 4–5.4)
SARS-COV-2 RDRP RESP QL NAA+PROBE: NEGATIVE
SODIUM SERPL-SCNC: 142 MMOL/L (ref 136–145)
TSH SERPL DL<=0.005 MIU/L-ACNC: 0.64 UIU/ML (ref 0.4–4)
WBC # BLD AUTO: 13.16 K/UL (ref 3.9–12.7)

## 2020-09-28 PROCEDURE — 99223 PR INITIAL HOSPITAL CARE,LEVL III: ICD-10-PCS | Mod: AI,GC,, | Performed by: STUDENT IN AN ORGANIZED HEALTH CARE EDUCATION/TRAINING PROGRAM

## 2020-09-28 PROCEDURE — U0002 COVID-19 LAB TEST NON-CDC: HCPCS | Mod: QW,S$GLB,, | Performed by: FAMILY MEDICINE

## 2020-09-28 PROCEDURE — 71046 XR CHEST PA AND LATERAL: ICD-10-PCS | Mod: S$GLB,,, | Performed by: RADIOLOGY

## 2020-09-28 PROCEDURE — 87040 BLOOD CULTURE FOR BACTERIA: CPT

## 2020-09-28 PROCEDURE — 20600001 HC STEP DOWN PRIVATE ROOM

## 2020-09-28 PROCEDURE — 93010 ELECTROCARDIOGRAM REPORT: CPT | Mod: 76,,, | Performed by: INTERNAL MEDICINE

## 2020-09-28 PROCEDURE — 63600175 PHARM REV CODE 636 W HCPCS: Performed by: STUDENT IN AN ORGANIZED HEALTH CARE EDUCATION/TRAINING PROGRAM

## 2020-09-28 PROCEDURE — 96366 THER/PROPH/DIAG IV INF ADDON: CPT

## 2020-09-28 PROCEDURE — 94640 AIRWAY INHALATION TREATMENT: CPT | Mod: 59,S$GLB,, | Performed by: FAMILY MEDICINE

## 2020-09-28 PROCEDURE — 83036 HEMOGLOBIN GLYCOSYLATED A1C: CPT

## 2020-09-28 PROCEDURE — 25000242 PHARM REV CODE 250 ALT 637 W/ HCPCS: Performed by: STUDENT IN AN ORGANIZED HEALTH CARE EDUCATION/TRAINING PROGRAM

## 2020-09-28 PROCEDURE — 83605 ASSAY OF LACTIC ACID: CPT

## 2020-09-28 PROCEDURE — 93010 EKG 12-LEAD: ICD-10-PCS | Mod: ,,, | Performed by: INTERNAL MEDICINE

## 2020-09-28 PROCEDURE — U0002: ICD-10-PCS | Mod: QW,S$GLB,, | Performed by: FAMILY MEDICINE

## 2020-09-28 PROCEDURE — 99285 EMERGENCY DEPT VISIT HI MDM: CPT | Mod: 25

## 2020-09-28 PROCEDURE — 99291 PR CRITICAL CARE, E/M 30-74 MINUTES: ICD-10-PCS | Mod: ,,, | Performed by: PHYSICIAN ASSISTANT

## 2020-09-28 PROCEDURE — 83735 ASSAY OF MAGNESIUM: CPT

## 2020-09-28 PROCEDURE — 96372 PR INJECTION,THERAP/PROPH/DIAG2ST, IM OR SUBCUT: ICD-10-PCS | Mod: 59,S$GLB,, | Performed by: FAMILY MEDICINE

## 2020-09-28 PROCEDURE — 25000003 PHARM REV CODE 250: Performed by: PHYSICIAN ASSISTANT

## 2020-09-28 PROCEDURE — 71046 X-RAY EXAM CHEST 2 VIEWS: CPT | Mod: S$GLB,,, | Performed by: RADIOLOGY

## 2020-09-28 PROCEDURE — 94640 AIRWAY INHALATION TREATMENT: CPT

## 2020-09-28 PROCEDURE — 93005 ELECTROCARDIOGRAM TRACING: CPT

## 2020-09-28 PROCEDURE — 85025 COMPLETE CBC W/AUTO DIFF WBC: CPT

## 2020-09-28 PROCEDURE — 25000003 PHARM REV CODE 250: Performed by: STUDENT IN AN ORGANIZED HEALTH CARE EDUCATION/TRAINING PROGRAM

## 2020-09-28 PROCEDURE — 84443 ASSAY THYROID STIM HORMONE: CPT

## 2020-09-28 PROCEDURE — 83880 ASSAY OF NATRIURETIC PEPTIDE: CPT

## 2020-09-28 PROCEDURE — 99223 1ST HOSP IP/OBS HIGH 75: CPT | Mod: AI,GC,, | Performed by: STUDENT IN AN ORGANIZED HEALTH CARE EDUCATION/TRAINING PROGRAM

## 2020-09-28 PROCEDURE — 94761 N-INVAS EAR/PLS OXIMETRY MLT: CPT

## 2020-09-28 PROCEDURE — 63600175 PHARM REV CODE 636 W HCPCS: Performed by: PHYSICIAN ASSISTANT

## 2020-09-28 PROCEDURE — 99291 CRITICAL CARE FIRST HOUR: CPT | Mod: ,,, | Performed by: PHYSICIAN ASSISTANT

## 2020-09-28 PROCEDURE — 96375 TX/PRO/DX INJ NEW DRUG ADDON: CPT

## 2020-09-28 PROCEDURE — 27000221 HC OXYGEN, UP TO 24 HOURS

## 2020-09-28 PROCEDURE — 93010 ELECTROCARDIOGRAM REPORT: CPT | Mod: ,,, | Performed by: INTERNAL MEDICINE

## 2020-09-28 PROCEDURE — 84100 ASSAY OF PHOSPHORUS: CPT

## 2020-09-28 PROCEDURE — 99214 OFFICE O/P EST MOD 30 MIN: CPT | Mod: 25,S$GLB,, | Performed by: FAMILY MEDICINE

## 2020-09-28 PROCEDURE — 80053 COMPREHEN METABOLIC PANEL: CPT

## 2020-09-28 PROCEDURE — 99214 PR OFFICE/OUTPT VISIT, EST, LEVL IV, 30-39 MIN: ICD-10-PCS | Mod: 25,S$GLB,, | Performed by: FAMILY MEDICINE

## 2020-09-28 PROCEDURE — 96365 THER/PROPH/DIAG IV INF INIT: CPT

## 2020-09-28 PROCEDURE — 96372 THER/PROPH/DIAG INJ SC/IM: CPT | Mod: 59,S$GLB,, | Performed by: FAMILY MEDICINE

## 2020-09-28 PROCEDURE — 94640 PR INHAL RX, AIRWAY OBST/DX SPUTUM INDUCT: ICD-10-PCS | Mod: 59,S$GLB,, | Performed by: FAMILY MEDICINE

## 2020-09-28 RX ORDER — HYDRALAZINE HYDROCHLORIDE 20 MG/ML
10 INJECTION INTRAMUSCULAR; INTRAVENOUS EVERY 6 HOURS PRN
Status: DISCONTINUED | OUTPATIENT
Start: 2020-09-28 | End: 2020-09-28

## 2020-09-28 RX ORDER — ATORVASTATIN CALCIUM 10 MG/1
10 TABLET, FILM COATED ORAL DAILY
Status: DISCONTINUED | OUTPATIENT
Start: 2020-09-29 | End: 2020-09-30

## 2020-09-28 RX ORDER — AZITHROMYCIN 250 MG/1
500 TABLET, FILM COATED ORAL DAILY
Status: COMPLETED | OUTPATIENT
Start: 2020-09-29 | End: 2020-09-30

## 2020-09-28 RX ORDER — OLOPATADINE HYDROCHLORIDE 2 MG/ML
1 SOLUTION/ DROPS OPHTHALMIC DAILY
Status: DISCONTINUED | OUTPATIENT
Start: 2020-09-29 | End: 2020-10-02 | Stop reason: HOSPADM

## 2020-09-28 RX ORDER — PREDNISONE 20 MG/1
40 TABLET ORAL DAILY
Status: DISCONTINUED | OUTPATIENT
Start: 2020-09-29 | End: 2020-10-02

## 2020-09-28 RX ORDER — ALBUTEROL SULFATE 0.83 MG/ML
2.5 SOLUTION RESPIRATORY (INHALATION)
Status: DISCONTINUED | OUTPATIENT
Start: 2020-09-28 | End: 2020-09-28

## 2020-09-28 RX ORDER — BETAMETHASONE SODIUM PHOSPHATE AND BETAMETHASONE ACETATE 3; 3 MG/ML; MG/ML
6 INJECTION, SUSPENSION INTRA-ARTICULAR; INTRALESIONAL; INTRAMUSCULAR; SOFT TISSUE
Status: DISCONTINUED | OUTPATIENT
Start: 2020-09-28 | End: 2020-09-28

## 2020-09-28 RX ORDER — POTASSIUM CHLORIDE 1.5 G/1.58G
40 POWDER, FOR SOLUTION ORAL
Status: COMPLETED | OUTPATIENT
Start: 2020-09-28 | End: 2020-09-28

## 2020-09-28 RX ORDER — IPRATROPIUM BROMIDE 0.5 MG/2.5ML
0.5 SOLUTION RESPIRATORY (INHALATION)
Status: DISCONTINUED | OUTPATIENT
Start: 2020-09-28 | End: 2020-09-28

## 2020-09-28 RX ORDER — INSULIN ASPART 100 [IU]/ML
0-5 INJECTION, SOLUTION INTRAVENOUS; SUBCUTANEOUS
Status: DISCONTINUED | OUTPATIENT
Start: 2020-09-28 | End: 2020-09-30

## 2020-09-28 RX ORDER — IPRATROPIUM BROMIDE AND ALBUTEROL SULFATE 2.5; .5 MG/3ML; MG/3ML
3 SOLUTION RESPIRATORY (INHALATION) EVERY 6 HOURS PRN
Status: DISCONTINUED | OUTPATIENT
Start: 2020-09-28 | End: 2020-09-30

## 2020-09-28 RX ORDER — LEVALBUTEROL INHALATION SOLUTION 0.63 MG/3ML
0.63 SOLUTION RESPIRATORY (INHALATION) EVERY 8 HOURS
Status: DISCONTINUED | OUTPATIENT
Start: 2020-09-28 | End: 2020-09-28

## 2020-09-28 RX ORDER — CEFTRIAXONE 1 G/1
1 INJECTION, POWDER, FOR SOLUTION INTRAMUSCULAR; INTRAVENOUS
Status: DISCONTINUED | OUTPATIENT
Start: 2020-09-29 | End: 2020-09-30

## 2020-09-28 RX ORDER — ENOXAPARIN SODIUM 100 MG/ML
40 INJECTION SUBCUTANEOUS EVERY 24 HOURS
Status: DISCONTINUED | OUTPATIENT
Start: 2020-09-28 | End: 2020-10-02 | Stop reason: HOSPADM

## 2020-09-28 RX ORDER — FLUTICASONE FUROATE AND VILANTEROL 200; 25 UG/1; UG/1
1 POWDER RESPIRATORY (INHALATION) DAILY
Status: DISCONTINUED | OUTPATIENT
Start: 2020-09-29 | End: 2020-10-02 | Stop reason: HOSPADM

## 2020-09-28 RX ORDER — LATANOPROST 50 UG/ML
1 SOLUTION/ DROPS OPHTHALMIC DAILY
Status: DISCONTINUED | OUTPATIENT
Start: 2020-09-29 | End: 2020-09-30

## 2020-09-28 RX ORDER — IBUPROFEN 200 MG
24 TABLET ORAL
Status: DISCONTINUED | OUTPATIENT
Start: 2020-09-28 | End: 2020-09-30

## 2020-09-28 RX ORDER — ACETAMINOPHEN 325 MG/1
650 TABLET ORAL EVERY 6 HOURS PRN
Status: DISCONTINUED | OUTPATIENT
Start: 2020-09-28 | End: 2020-10-02 | Stop reason: HOSPADM

## 2020-09-28 RX ORDER — SODIUM CHLORIDE 0.9 % (FLUSH) 0.9 %
10 SYRINGE (ML) INJECTION
Status: DISCONTINUED | OUTPATIENT
Start: 2020-09-28 | End: 2020-10-02 | Stop reason: HOSPADM

## 2020-09-28 RX ORDER — POTASSIUM CHLORIDE 750 MG/1
30 CAPSULE, EXTENDED RELEASE ORAL EVERY 4 HOURS
Status: COMPLETED | OUTPATIENT
Start: 2020-09-28 | End: 2020-09-28

## 2020-09-28 RX ORDER — IBUPROFEN 200 MG
16 TABLET ORAL
Status: DISCONTINUED | OUTPATIENT
Start: 2020-09-28 | End: 2020-09-30

## 2020-09-28 RX ORDER — SODIUM CHLORIDE 0.9 % (FLUSH) 0.9 %
10 SYRINGE (ML) INJECTION
Status: CANCELLED | OUTPATIENT
Start: 2020-09-28

## 2020-09-28 RX ORDER — MAGNESIUM SULFATE HEPTAHYDRATE 40 MG/ML
2 INJECTION, SOLUTION INTRAVENOUS ONCE
Status: COMPLETED | OUTPATIENT
Start: 2020-09-28 | End: 2020-09-28

## 2020-09-28 RX ORDER — GLUCAGON 1 MG
1 KIT INJECTION
Status: DISCONTINUED | OUTPATIENT
Start: 2020-09-28 | End: 2020-09-30

## 2020-09-28 RX ORDER — IRBESARTAN 150 MG/1
300 TABLET ORAL NIGHTLY
Status: DISCONTINUED | OUTPATIENT
Start: 2020-09-29 | End: 2020-10-02 | Stop reason: HOSPADM

## 2020-09-28 RX ORDER — HYDRALAZINE HYDROCHLORIDE 25 MG/1
25 TABLET, FILM COATED ORAL EVERY 6 HOURS PRN
Status: DISCONTINUED | OUTPATIENT
Start: 2020-09-28 | End: 2020-10-02

## 2020-09-28 RX ORDER — OLOPATADINE HYDROCHLORIDE 1 MG/ML
1 SOLUTION/ DROPS OPHTHALMIC 2 TIMES DAILY
Status: DISCONTINUED | OUTPATIENT
Start: 2020-09-28 | End: 2020-09-28

## 2020-09-28 RX ORDER — SODIUM CHLORIDE/SODIUM BICARB
PACKET (EA) NASAL
Status: ON HOLD | COMMUNITY
Start: 2020-09-15 | End: 2020-09-30 | Stop reason: CLARIF

## 2020-09-28 RX ORDER — CEFTRIAXONE 1 G/1
1 INJECTION, POWDER, FOR SOLUTION INTRAMUSCULAR; INTRAVENOUS
Status: COMPLETED | OUTPATIENT
Start: 2020-09-28 | End: 2020-09-28

## 2020-09-28 RX ADMIN — MAGNESIUM SULFATE 2 G: 2 INJECTION INTRAVENOUS at 07:09

## 2020-09-28 RX ADMIN — LEVALBUTEROL HYDROCHLORIDE 0.63 MG: 0.63 SOLUTION RESPIRATORY (INHALATION) at 06:09

## 2020-09-28 RX ADMIN — POTASSIUM CHLORIDE 30 MEQ: 750 CAPSULE, EXTENDED RELEASE ORAL at 10:09

## 2020-09-28 RX ADMIN — INSULIN ASPART 1 UNITS: 100 INJECTION, SOLUTION INTRAVENOUS; SUBCUTANEOUS at 11:09

## 2020-09-28 RX ADMIN — POTASSIUM CHLORIDE 30 MEQ: 750 CAPSULE, EXTENDED RELEASE ORAL at 07:09

## 2020-09-28 RX ADMIN — ALBUTEROL SULFATE 2.5 MG: 0.83 SOLUTION RESPIRATORY (INHALATION) at 11:09

## 2020-09-28 RX ADMIN — POTASSIUM CHLORIDE 40 MEQ: 1.5 POWDER, FOR SOLUTION ORAL at 06:09

## 2020-09-28 RX ADMIN — ENOXAPARIN SODIUM 40 MG: 40 INJECTION SUBCUTANEOUS at 07:09

## 2020-09-28 RX ADMIN — HYDRALAZINE HYDROCHLORIDE 25 MG: 25 TABLET, FILM COATED ORAL at 10:09

## 2020-09-28 RX ADMIN — IPRATROPIUM BROMIDE 0.5 MG: 0.5 SOLUTION RESPIRATORY (INHALATION) at 11:09

## 2020-09-28 RX ADMIN — CEFTRIAXONE SODIUM 1 G: 1 INJECTION, POWDER, FOR SOLUTION INTRAMUSCULAR; INTRAVENOUS at 04:09

## 2020-09-28 RX ADMIN — AZITHROMYCIN 500 MG: 500 INJECTION, POWDER, LYOPHILIZED, FOR SOLUTION INTRAVENOUS at 04:09

## 2020-09-28 RX ADMIN — BETAMETHASONE SODIUM PHOSPHATE AND BETAMETHASONE ACETATE 6 MG: 3; 3 INJECTION, SUSPENSION INTRA-ARTICULAR; INTRALESIONAL; INTRAMUSCULAR; SOFT TISSUE at 11:09

## 2020-09-28 NOTE — HPI
Sussy Costa is an 84 year old female with HTN, DM2, asthma, and hyperlipidemia who presents with a 7 day history of shortness of breath, cough, wheezing, and some sputum production. The shortness of breath is with walking and mild exertion. Denies any shortness of breath at rest. Does not use home oxygen. Has been coughing up some grey sputum the past few days. Over the past 3 days, patient has had to use her inhaler more frequently. Patient was initially seen at Urgent Care and then was told to go to ED. At the Urgent Care, she was given Duo-nebs and a steroid injection. CXR was completed there and concerning for RML pneumonia. Patient is COVID negative. Her  was recently admitted for pneumonia and has been visiting him in the hospital. Denies any recent abx use. Denies any recent travel. Patient denies fevers, chills, chest pain, abdominal pain, and N/V/D.

## 2020-09-28 NOTE — ED PROVIDER NOTES
Encounter Date: 9/28/2020       History     Chief Complaint   Patient presents with    Shortness of Breath     sent from urgent care for possible pneumonia and SOb; received 2 duonebs at urgent care     85 y/o WF with history of HTN, DM2, asthma, hyperlipidemia presents to the ED c/o pneumonia.  She reports cough productive of gray sputum, shortness of breath for the past 2 days that worsened today.  She went to urgent care earlier today where she had CXR and was sent to the ED for further eval of pneumonia. Her  was recently admitted for PNA. She denies any recent abx, hospitalization or travel.  She denies fever, chills, chest pain, abdominal pain, nausea, dysuria, headache. She has not been on any abx.     The history is provided by the patient.     Review of patient's allergies indicates:   Allergen Reactions    Aspirin Other (See Comments)     Asthma    TRIAD OF NASAL POLYPS, ASA  ALLERGY AND ASTHMA.        Aspirin Other (See Comments)    Nsaids (non-steroidal anti-inflammatory drug) Other (See Comments)     AVOID DUE TO TRIAD OF ASA ALLERGY, NASAL POLYPS,AND ASTHMA    Penicillin      Other reaction(s): Rash    9/30/20: tolerates ceftriaxone     Past Medical History:   Diagnosis Date    Asthma     Cyst of pancreas     liver    Diabetes mellitus type II     Eczema of hand     Glaucoma     Hyperlipidemia     Hypertension     Lichen planus     gums    Osteoporosis     Pulmonary nodules      Past Surgical History:   Procedure Laterality Date    CATARACT EXTRACTION, BILATERAL      CHOLECYSTECTOMY      EPIDURAL STEROID INJECTION INTO LUMBAR SPINE N/A 11/8/2018    Procedure: Injection-steroid-epidural-lumbar L5-S1;  Surgeon: Nicci Osorio Jr., MD;  Location: [facility];  Service: Pain Management;  Laterality: N/A;    FUNCTIONAL ENDOSCOPIC SINUS SURGERY (FESS) USING COMPUTER-ASSISTED NAVIGATION N/A 6/17/2019    Procedure: FESS, USING COMPUTER-ASSISTED NAVIGATION;  Surgeon: Rosangela Colin  MD [place];  Location: [facility] OR;  Service: ENT;  Laterality: N/A;  video    HYSTERECTOMY      nasal polyps       Family History   Problem Relation Age of Onset    Heart disease Father     Heart disease Brother     Hypertension Brother      Social History     Tobacco Use    Smoking status: Never Smoker    Smokeless tobacco: Never Used   Substance Use Topics    Alcohol use: Yes     Comment: socially    Drug use: No     Review of Systems   Constitutional: Negative for chills and fever.   HENT: Positive for congestion. Negative for rhinorrhea, sneezing and sore throat.    Eyes: Negative for photophobia.   Respiratory: Positive for cough (productive of gray sputum) and shortness of breath. Negative for chest tightness and wheezing.    Cardiovascular: Negative for chest pain.   Gastrointestinal: Negative for abdominal pain, constipation, diarrhea, nausea and vomiting.   Genitourinary: Negative for dysuria and hematuria.   Musculoskeletal: Negative for back pain.   Skin: Negative for rash.   Neurological: Negative for numbness and headaches.   Psychiatric/Behavioral: Negative for confusion.       Physical Exam     Initial Vitals [09/28/20 1506]   BP Pulse Resp Temp SpO2   (!) 198/108 (!) 116 (!) 26 96.6 °F (35.9 °C) 96 %      MAP       --         Physical Exam    Nursing note and vitals reviewed.  Constitutional: She appears well-developed and well-nourished. She is not diaphoretic. No distress.   HENT:   Head: Normocephalic and atraumatic.   Neck: Normal range of motion. Neck supple.   Cardiovascular: Regular rhythm and normal heart sounds. Tachycardia present.  Exam reveals no gallop and no friction rub.    No murmur heard.  Pulmonary/Chest: Tachypnea noted. She has rhonchi. She has rales.   Coarse breath sounds throughout, R>L   Abdominal: Soft. Bowel sounds are normal. There is no abdominal tenderness. There is no rebound and no guarding.   Musculoskeletal: Normal range of motion.   Neurological: She is  alert and oriented to person, place, and time.   Skin: Skin is warm and dry.   Psychiatric: She has a normal mood and affect.         ED Course   Procedures  Labs Reviewed   CBC W/ AUTO DIFFERENTIAL - Abnormal; Notable for the following components:       Result Value    WBC 13.16 (*)     Mean Corpuscular Hemoglobin Conc 31.9 (*)     Immature Granulocytes 0.7 (*)     Gran # (ANC) 12.3 (*)     Immature Grans (Abs) 0.09 (*)     Lymph # 0.6 (*)     Mono # 0.1 (*)     Gran% 93.7 (*)     Lymph% 4.3 (*)     Mono% 1.0 (*)     All other components within normal limits   COMPREHENSIVE METABOLIC PANEL - Abnormal; Notable for the following components:    Potassium 3.1 (*)     Chloride 112 (*)     CO2 22 (*)     Glucose 285 (*)     Calcium 7.7 (*)     Total Protein 5.4 (*)     Albumin 2.5 (*)     All other components within normal limits   HEMOGLOBIN A1C - Abnormal; Notable for the following components:    Hemoglobin A1C 9.3 (*)     Estimated Avg Glucose 220 (*)     All other components within normal limits    Narrative:     ADD ON GHGB [id number] & BNP [id number] PER DR. SIDHU.  09/28/2020    18:51    PHOSPHORUS - Abnormal; Notable for the following components:    Phosphorus 2.0 (*)     All other components within normal limits    Narrative:     ADD ON GHGB [id number] & BNP [id number] PER DR. SIDHU.  09/28/2020    18:51   add on phos roitp=[id number], tsh kfrid=[id number], mg lhyaz=[id number]   per dr sidhu 09/28/2020  20:57    MAGNESIUM - Abnormal; Notable for the following components:    Magnesium 1.5 (*)     All other components within normal limits    Narrative:     ADD ON GHGB [id number] & BNP 551534047 PER DR. SIDHU.  09/28/2020    18:51   add on phos gwnbg=[id number], tsh hvkok=[id number], mg brxiw=[id number]   per dr sidhu 09/28/2020  20:57    CULTURE, RESPIRATORY   LACTIC ACID, PLASMA   MAGNESIUM   B-TYPE NATRIURETIC PEPTIDE   HEMOGLOBIN A1C   TSH   PHOSPHORUS   B-TYPE NATRIURETIC PEPTIDE    Narrative:     ADD ON GHGB [id number] &  BNP [id number] PER DR. SIDHU.  09/28/2020    18:51    TSH    Narrative:     ADD ON GHGB [id number] & BNP [id number] PER DR. SIDHU.  09/28/2020    18:51   add on phos suaiy=[id number], tsh ysahc=[id number], mg trckw=[id number]   per dr sidhu 09/28/2020  20:57         ECG Results          EKG 12-lead (Final result)  Result time 09/29/20 11:17:34    Final result by Interface, Lab In [facility] (09/29/20 11:17:34)                 Narrative:    Test Reason : R06.02,    Vent. Rate : 107 BPM     Atrial Rate : 107 BPM     P-R Int : 156 ms          QRS Dur : 102 ms      QT Int : 368 ms       P-R-T Axes : 048 038 065 degrees     QTc Int : 491 ms    Sinus tachycardia with PACs  Incomplete right bundle branch block  Borderline Abnormal ECG  When compared with ECG of 28-SEP-2020 15:32,  No significant change was found    Confirmed by Korina Corado MD ([id number]) on 9/29/2020 11:17:21 AM    Referred By: [email or web address]   SELF           Confirmed By:Korina Corado MD                             EKG 12-lead (Final result)  Result time 09/29/20 14:45:01    Final result by Interface, Lab In [facility] (09/29/20 14:45:01)                 Narrative:    Test Reason : I48.0,    Vent. Rate : 114 BPM     Atrial Rate : 114 BPM     P-R Int : 000 ms          QRS Dur : 094 ms      QT Int : 342 ms       P-R-T Axes : 000 053 060 degrees     QTc Int : 471 ms    Normal sinus rhythm with PACs  Low voltage QRS  Incomplete right bundle branch block  Abnormal ECG  When compared with ECG of 04-JUN-2019 11:54,  Premature atrial complexes are now Present  Vent. rate has increased BY  39 BPM  Confirmed by Korina Corado MD ([id number]) on 9/29/2020 2:44:49 PM    Referred By: [email or web address]   SELF           Confirmed By:Korina Corado MD                            Imaging Results    None          Medical Decision Making:   History:   Old Medical Records: I decided to obtain old medical records.  Old Records Summarized: records from clinic visits.       <> Summary of Records: Seen at  urgent care earlier today, COVID negative, 88% on RA, placed on oxygen; CXR shows RML pneumonia.   Clinical Tests:   Lab Tests: Ordered and Reviewed  Radiological Study: Reviewed       APC / Resident Notes:   85 y/o WF with history of HTN, DM2, asthma, hyperlipidemia presents to the ED c/o pneumonia.  Tachycardic. 88% on RA at urgent care, she received duoneb x 2 at urgent care.  Upon arrival to the ED 96% on RA. Rhonchi/rales and course breath sounds throughout lungs, R>L. CXR from urgent care reviewed. Abdomen soft and nontender. Given IV rocephin/azithromcyin.     She had COVID swab today at urgent care - negative.    Sat 90% at rest, placed on 2L oxygen with improvement.    Leukocytosis noted with WBC 13.16. Mild hypokalemia. Lactic acid normal. Blood culture x 2 pending.     Admitted to hospital medicine for further management of pneumonia and hypoxia.          Attending Attestation:     Physician Attestation Statement for NP/PA:   I have conducted a face to face encounter with this patient in addition to the NP/PA, due to Medical Complexity    Other NP/PA Attestation Additions:    History of Present Illness: SOB, Cough   Physical Exam: Hypoxia, Tachypnea   Medical Decision Making: PNA     Attending Critical Care:   Critical Care Times:   Direct Patient Care (initial evaluation, reassessments, and time considering the case)................................................................20 minutes.   Additional History from reviewing old medical records or taking additional history from the family, EMS, PCP, etc.......................5 minutes.   Ordering, Reviewing, and Interpreting Diagnostic Studies...............................................................................................................5 minutes.   Documentation..................................................................................................................................................................................5  minutes.   Consultation with other Physicians. .................................................................................................................................................5 minutes.   ==============================================================  · Total Critical Care Time - exclusive of procedural time: 40 minutes.  ==============================================================  Critical care was necessary to treat or prevent imminent or life-threatening deterioration of the following conditions: sepsis.   Critical care was time spent personally by me on the following activities: obtaining history from patient or relative, examination of patient, review of x-rays / CT sent with the patient, review of old charts, ordering lab, x-rays, and/or EKG, development of treatment plan with patient or relative, ordering and performing treatments and interventions, evaluation of patient's response to treatment, discussion with consultants, interpretation of cardiac measurements and re-evaluation of patient's conition.   Critical Care Condition: potentially life-threatening       Attending ED Notes:   STAFF ATTENDING PHYSICIAN NOTE:  I evaluated patient, discussed with the DOMONIQUE and agree with their management of care. I have also reviewed their notes, assessments, and/or procedures documented on Sussy Costa. I individually repeated key portions of the DOMONIQUE's history and physical exam, discussed Sussyaissatou Costa's care with the DOMONIQUE, reviewed their documentation and agree with their assessment and management of care provided. I was also present for any critical portion of any procedure(s) performed.  ____________________  Dre Houston MD, [facility]  Emergency Medicine Staff                      Clinical Impression:       ICD-10-CM ICD-9-CM   1. Pneumonia of right middle lobe due to infectious organism  J18.1 486   2. Shortness of breath  R06.02 786.05   3. Hypoxia  R09.02 799.02   4. Paroxysmal A-fib  I48.0  427.31                      Disposition:   Disposition: Admitted  Condition: Serious     ED Disposition Condition    Admit                             Vera Barnett PA-C  09/28/20 1910       Reginald Houston MD  10/01/20 1342

## 2020-09-28 NOTE — ED TRIAGE NOTES
Pt. Is an 84 y.o. female coming into the ED today via EMS from [facility] urgent care. Pt. Reports she went to urgent care today due to increasing SOB over the past week.   Per EMS report the pt. Was transported to the emergency department because of low oxygen saturation while at urgent care.

## 2020-09-28 NOTE — TELEPHONE ENCOUNTER
Patient does not feel good and wanted to cancel tomorrow's appointment. She stated she will call back when she is ready to reschedule. Patient thanked me for calling.

## 2020-09-28 NOTE — PATIENT INSTRUCTIONS
Pneumonia (Adult)  Pneumonia is an infection deep within the lungs. It is in the small air sacs (alveoli). Pneumonia may be caused by a virus or bacteria. Pneumonia caused by bacteria is usually treated with an antibiotic. Severe cases may need to be treated in the hospital. Milder cases can be treated at home. Symptoms usually start to get better during the first 2 days of treatment.    Home care  Follow these guidelines when caring for yourself at home:  · Rest at home for the first 2 to 3 days, or until you feel stronger. Dont let yourself get overly tired when you go back to your activities.  · Stay away from cigarette smoke - yours or other peoples.  · You may use acetaminophen or ibuprofen to control fever or pain, unless another medicine was prescribed. If you have chronic liver or kidney disease, talk with your healthcare provider before using these medicines. Also talk with your provider if youve had a stomach ulcer or gastrointestinal bleeding. Dont give aspirin to anyone younger than 18 years of age who is ill with a fever. It may cause severe liver damage.  · Your appetite may be poor, so a light diet is fine.  · Drink 6 to 8 glasses of fluids every day to make sure you are getting enough fluids. Beverages can include water, sport drinks, sodas without caffeine, juices, tea, or soup. Fluids will help loosen secretions in the lung. This will make it easier for you to cough up the phlegm (sputum). If you also have heart or kidney disease, check with your healthcare provider before you drink extra fluids.  · Take antibiotic medicine prescribed until it is all gone, even if you are feeling better after a few days.  Follow-up care  Follow up with your healthcare provider in the next 2 to 3 days, or as advised. This is to be sure the medicine is helping you get better.  If you are 65 or older, you should get a pneumococcal vaccine and a yearly flu (influenza) shot. You should also get these vaccines if  you have chronic lung disease like asthma, emphysema, or COPD. Recently, a second type of pneumonia vaccine has become available for everyone over 65 years old. This is in addition to the previous vaccine. Ask your provider about this.  When to seek medical advice  Call your healthcare provider right away if any of these occur:  · You dont get better within the first 48 hours of treatment  · Shortness of breath gets worse  · Rapid breathing (more than 25 breaths per minute)  · Coughing up blood  · Chest pain gets worse with breathing  · Fever of 100.4°F (38°C) or higher that doesnt get better with fever medicine  · Weakness, dizziness, or fainting that gets worse  · Thirst or dry mouth that gets worse  · Sinus pain, headache, or a stiff neck  · Chest pain not caused by coughing  Date Last Reviewed: 1/1/2017  © 3920-7240 [employer]. [address] [place] [id number]. All rights reserved. This information is not intended as a substitute for professional medical care. Always follow your healthcare professional's instructions.

## 2020-09-28 NOTE — PROGRESS NOTES
"Subjective:       Patient ID: Sussy Costa is a 84 y.o. female.    Vitals:  height is 5' 5" (1.651 m) and weight is 64 kg (141 lb). Her temperature is 97.9 °F (36.6 °C). Her blood pressure is 167/90 (abnormal) and her pulse is 103. Her respiration is 22 (abnormal) and oxygen saturation is 90% (abnormal).     Chief Complaint: Cough    This is a 84 y.o. female who presents today with a chief complaint of   Sob, cough, wheezing. Pt has a hx of asthma. Using inhaler and breathing treatment mild relief. Pt sx for a week     Cough  This is a new problem. The current episode started in the past 7 days. The problem has been gradually worsening. The problem occurs every few minutes. The cough is non-productive. Associated symptoms include shortness of breath and wheezing. Pertinent negatives include no chills, ear pain, eye redness, fever, hemoptysis, myalgias, rash or sore throat. The symptoms are aggravated by lying down and pollens. She has tried steroid inhaler and ipratropium inhaler for the symptoms. The treatment provided mild relief. Her past medical history is significant for asthma.       Constitution: Positive for fatigue. Negative for chills, sweating and fever.   HENT: Negative for ear pain, congestion, sinus pain, sinus pressure, sore throat and voice change.    Neck: Negative for painful lymph nodes.   Eyes: Negative for eye redness.   Respiratory: Positive for cough, shortness of breath, wheezing and asthma. Negative for chest tightness, sputum production, bloody sputum, COPD and stridor.    Gastrointestinal: Negative for nausea and vomiting.   Musculoskeletal: Negative for muscle ache.   Skin: Negative for rash.   Allergic/Immunologic: Positive for asthma. Negative for seasonal allergies.   Hematologic/Lymphatic: Negative for swollen lymph nodes.       Objective:      Physical Exam   HENT:   Head: Normocephalic and atraumatic.   Nose: Congestion present.   Mouth/Throat: Mucous membranes are moist. "   Eyes: Pupils are equal, round, and reactive to light. extraocular movement intact  Neck: Normal range of motion. Neck supple.   Cardiovascular: Regular rhythm. Tachycardia present.   Pulmonary/Chest: She is in respiratory distress (tachypneic). She has wheezes. She has rales (bilaterally).   Abdominal: Soft. Normal appearance.   Neurological: She is alert.   Nursing note and vitals reviewed.        Results for orders placed or performed in visit on 09/28/20   POCT COVID-19 Rapid Screening   Result Value Ref Range    POC Rapid COVID Negative Negative     Acceptable Yes      Assessment:       1. Pneumonia of right middle lobe due to infectious organism    2. Hypoxia    3. Exacerbation of asthma, unspecified asthma severity, unspecified whether persistent      improved symptomatically after nebs X 2 however oxygen dependent on 4L. Will refer to ER for further evaluation.   Plan:         Pneumonia of right middle lobe due to infectious organism  -     Refer to Emergency Dept.    Hypoxia  -     POCT COVID-19 Rapid Screening  -     betamethasone acetate-betamethasone sodium phosphate injection 6 mg  -     albuterol nebulizer solution 2.5 mg  -     ipratropium 0.02 % nebulizer solution 0.5 mg  -     X-Ray Chest PA And Lateral    Exacerbation of asthma, unspecified asthma severity, unspecified whether persistent  -     Refer to Emergency Dept.

## 2020-09-29 ENCOUNTER — PATIENT MESSAGE (OUTPATIENT)
Dept: OTHER | Facility: OTHER | Age: 85
End: 2020-09-29

## 2020-09-29 LAB
ALBUMIN SERPL BCP-MCNC: 2.9 G/DL (ref 3.5–5.2)
ALP SERPL-CCNC: 70 U/L (ref 55–135)
ALT SERPL W/O P-5'-P-CCNC: 19 U/L (ref 10–44)
ANION GAP SERPL CALC-SCNC: 10 MMOL/L (ref 8–16)
ANION GAP SERPL CALC-SCNC: 8 MMOL/L (ref 8–16)
ANION GAP SERPL CALC-SCNC: 9 MMOL/L (ref 8–16)
AST SERPL-CCNC: 14 U/L (ref 10–40)
BASOPHILS # BLD AUTO: 0.04 K/UL (ref 0–0.2)
BASOPHILS NFR BLD: 0.3 % (ref 0–1.9)
BILIRUB SERPL-MCNC: 0.6 MG/DL (ref 0.1–1)
BUN SERPL-MCNC: 13 MG/DL (ref 8–23)
BUN SERPL-MCNC: 15 MG/DL (ref 8–23)
BUN SERPL-MCNC: 27 MG/DL (ref 8–23)
CALCIUM SERPL-MCNC: 10.2 MG/DL (ref 8.7–10.5)
CALCIUM SERPL-MCNC: 9.8 MG/DL (ref 8.7–10.5)
CALCIUM SERPL-MCNC: 9.9 MG/DL (ref 8.7–10.5)
CHLORIDE SERPL-SCNC: 101 MMOL/L (ref 95–110)
CHLORIDE SERPL-SCNC: 104 MMOL/L (ref 95–110)
CHLORIDE SERPL-SCNC: 106 MMOL/L (ref 95–110)
CO2 SERPL-SCNC: 24 MMOL/L (ref 23–29)
CO2 SERPL-SCNC: 24 MMOL/L (ref 23–29)
CO2 SERPL-SCNC: 28 MMOL/L (ref 23–29)
CREAT SERPL-MCNC: 0.8 MG/DL (ref 0.5–1.4)
CREAT SERPL-MCNC: 0.9 MG/DL (ref 0.5–1.4)
CREAT SERPL-MCNC: 1.4 MG/DL (ref 0.5–1.4)
DIFFERENTIAL METHOD: ABNORMAL
EOSINOPHIL # BLD AUTO: 0 K/UL (ref 0–0.5)
EOSINOPHIL NFR BLD: 0 % (ref 0–8)
ERYTHROCYTE [DISTWIDTH] IN BLOOD BY AUTOMATED COUNT: 12.9 % (ref 11.5–14.5)
EST. GFR  (AFRICAN AMERICAN): 40.1 ML/MIN/1.73 M^2
EST. GFR  (AFRICAN AMERICAN): >60 ML/MIN/1.73 M^2
EST. GFR  (AFRICAN AMERICAN): >60 ML/MIN/1.73 M^2
EST. GFR  (NON AFRICAN AMERICAN): 34.8 ML/MIN/1.73 M^2
EST. GFR  (NON AFRICAN AMERICAN): 59.3 ML/MIN/1.73 M^2
EST. GFR  (NON AFRICAN AMERICAN): >60 ML/MIN/1.73 M^2
GLUCOSE SERPL-MCNC: 267 MG/DL (ref 70–110)
GLUCOSE SERPL-MCNC: 307 MG/DL (ref 70–110)
GLUCOSE SERPL-MCNC: 405 MG/DL (ref 70–110)
HCT VFR BLD AUTO: 43.9 % (ref 37–48.5)
HGB BLD-MCNC: 13.8 G/DL (ref 12–16)
IMM GRANULOCYTES # BLD AUTO: 0.11 K/UL (ref 0–0.04)
IMM GRANULOCYTES NFR BLD AUTO: 0.7 % (ref 0–0.5)
LYMPHOCYTES # BLD AUTO: 1.4 K/UL (ref 1–4.8)
LYMPHOCYTES NFR BLD: 9 % (ref 18–48)
MAGNESIUM SERPL-MCNC: 2.4 MG/DL (ref 1.6–2.6)
MCH RBC QN AUTO: 29.4 PG (ref 27–31)
MCHC RBC AUTO-ENTMCNC: 31.4 G/DL (ref 32–36)
MCV RBC AUTO: 94 FL (ref 82–98)
MONOCYTES # BLD AUTO: 0.5 K/UL (ref 0.3–1)
MONOCYTES NFR BLD: 3.5 % (ref 4–15)
NEUTROPHILS # BLD AUTO: 13.5 K/UL (ref 1.8–7.7)
NEUTROPHILS NFR BLD: 86.5 % (ref 38–73)
NRBC BLD-RTO: 0 /100 WBC
PLATELET # BLD AUTO: 307 K/UL (ref 150–350)
PMV BLD AUTO: 12.1 FL (ref 9.2–12.9)
POCT GLUCOSE: 248 MG/DL (ref 70–110)
POCT GLUCOSE: 306 MG/DL (ref 70–110)
POCT GLUCOSE: 379 MG/DL (ref 70–110)
POCT GLUCOSE: 406 MG/DL (ref 70–110)
POCT GLUCOSE: 421 MG/DL (ref 70–110)
POTASSIUM SERPL-SCNC: 4.5 MMOL/L (ref 3.5–5.1)
POTASSIUM SERPL-SCNC: 5.5 MMOL/L (ref 3.5–5.1)
POTASSIUM SERPL-SCNC: 5.6 MMOL/L (ref 3.5–5.1)
PROT SERPL-MCNC: 6.4 G/DL (ref 6–8.4)
RBC # BLD AUTO: 4.69 M/UL (ref 4–5.4)
SODIUM SERPL-SCNC: 137 MMOL/L (ref 136–145)
SODIUM SERPL-SCNC: 138 MMOL/L (ref 136–145)
SODIUM SERPL-SCNC: 139 MMOL/L (ref 136–145)
WBC # BLD AUTO: 15.62 K/UL (ref 3.9–12.7)

## 2020-09-29 PROCEDURE — 97161 PT EVAL LOW COMPLEX 20 MIN: CPT

## 2020-09-29 PROCEDURE — 63600175 PHARM REV CODE 636 W HCPCS: Performed by: STUDENT IN AN ORGANIZED HEALTH CARE EDUCATION/TRAINING PROGRAM

## 2020-09-29 PROCEDURE — 99233 SBSQ HOSP IP/OBS HIGH 50: CPT | Mod: GC,,, | Performed by: STUDENT IN AN ORGANIZED HEALTH CARE EDUCATION/TRAINING PROGRAM

## 2020-09-29 PROCEDURE — 83735 ASSAY OF MAGNESIUM: CPT

## 2020-09-29 PROCEDURE — 25000003 PHARM REV CODE 250: Performed by: STUDENT IN AN ORGANIZED HEALTH CARE EDUCATION/TRAINING PROGRAM

## 2020-09-29 PROCEDURE — 20600001 HC STEP DOWN PRIVATE ROOM

## 2020-09-29 PROCEDURE — 63700000 PHARM REV CODE 250 ALT 637 W/O HCPCS: Performed by: STUDENT IN AN ORGANIZED HEALTH CARE EDUCATION/TRAINING PROGRAM

## 2020-09-29 PROCEDURE — 27000221 HC OXYGEN, UP TO 24 HOURS

## 2020-09-29 PROCEDURE — 36415 COLL VENOUS BLD VENIPUNCTURE: CPT

## 2020-09-29 PROCEDURE — 97165 OT EVAL LOW COMPLEX 30 MIN: CPT

## 2020-09-29 PROCEDURE — 80048 BASIC METABOLIC PNL TOTAL CA: CPT

## 2020-09-29 PROCEDURE — 85025 COMPLETE CBC W/AUTO DIFF WBC: CPT

## 2020-09-29 PROCEDURE — 99233 PR SUBSEQUENT HOSPITAL CARE,LEVL III: ICD-10-PCS | Mod: GC,,, | Performed by: STUDENT IN AN ORGANIZED HEALTH CARE EDUCATION/TRAINING PROGRAM

## 2020-09-29 PROCEDURE — 94761 N-INVAS EAR/PLS OXIMETRY MLT: CPT

## 2020-09-29 PROCEDURE — C9399 UNCLASSIFIED DRUGS OR BIOLOG: HCPCS | Performed by: STUDENT IN AN ORGANIZED HEALTH CARE EDUCATION/TRAINING PROGRAM

## 2020-09-29 PROCEDURE — 25000242 PHARM REV CODE 250 ALT 637 W/ HCPCS: Performed by: STUDENT IN AN ORGANIZED HEALTH CARE EDUCATION/TRAINING PROGRAM

## 2020-09-29 PROCEDURE — 80048 BASIC METABOLIC PNL TOTAL CA: CPT | Mod: 91

## 2020-09-29 PROCEDURE — 97535 SELF CARE MNGMENT TRAINING: CPT

## 2020-09-29 PROCEDURE — 80053 COMPREHEN METABOLIC PANEL: CPT

## 2020-09-29 RX ORDER — FUROSEMIDE 10 MG/ML
40 INJECTION INTRAMUSCULAR; INTRAVENOUS ONCE
Status: COMPLETED | OUTPATIENT
Start: 2020-09-29 | End: 2020-09-29

## 2020-09-29 RX ADMIN — CEFTRIAXONE SODIUM 1 G: 1 INJECTION, POWDER, FOR SOLUTION INTRAMUSCULAR; INTRAVENOUS at 03:09

## 2020-09-29 RX ADMIN — INSULIN ASPART 4 UNITS: 100 INJECTION, SOLUTION INTRAVENOUS; SUBCUTANEOUS at 12:09

## 2020-09-29 RX ADMIN — INSULIN ASPART 5 UNITS: 100 INJECTION, SOLUTION INTRAVENOUS; SUBCUTANEOUS at 05:09

## 2020-09-29 RX ADMIN — INSULIN ASPART 3 UNITS: 100 INJECTION, SOLUTION INTRAVENOUS; SUBCUTANEOUS at 08:09

## 2020-09-29 RX ADMIN — FLUTICASONE FUROATE AND VILANTEROL TRIFENATATE 1 PUFF: 200; 25 POWDER RESPIRATORY (INHALATION) at 09:09

## 2020-09-29 RX ADMIN — INSULIN ASPART 3 UNITS: 100 INJECTION, SOLUTION INTRAVENOUS; SUBCUTANEOUS at 09:09

## 2020-09-29 RX ADMIN — INSULIN DETEMIR 5 UNITS: 100 INJECTION, SOLUTION SUBCUTANEOUS at 05:09

## 2020-09-29 RX ADMIN — FUROSEMIDE 40 MG: 10 INJECTION, SOLUTION INTRAMUSCULAR; INTRAVENOUS at 09:09

## 2020-09-29 RX ADMIN — PREDNISONE 40 MG: 20 TABLET ORAL at 08:09

## 2020-09-29 RX ADMIN — ENOXAPARIN SODIUM 40 MG: 40 INJECTION SUBCUTANEOUS at 05:09

## 2020-09-29 RX ADMIN — ATORVASTATIN CALCIUM 10 MG: 10 TABLET, FILM COATED ORAL at 08:09

## 2020-09-29 RX ADMIN — IRBESARTAN 300 MG: 150 TABLET, FILM COATED ORAL at 09:09

## 2020-09-29 RX ADMIN — LATANOPROST 1 DROP: 50 SOLUTION OPHTHALMIC at 09:09

## 2020-09-29 RX ADMIN — OLOPATADINE HYDROCHLORIDE 1 DROP: 2 SOLUTION OPHTHALMIC at 09:09

## 2020-09-29 RX ADMIN — AZITHROMYCIN 500 MG: 250 TABLET, FILM COATED ORAL at 08:09

## 2020-09-29 NOTE — ASSESSMENT & PLAN NOTE
Sussy Costa is an 84 year old female with HTN, DM2, asthma, and hyperlipidemia who presents with shortness of breath and wheezing. CXR concerning for RML pneumonia from Urgent Care. Patient afebrile and hemodynamically stable on arrival. Requiring 2 L NC and given Duonebs. WBC 13.1 and lactate wnl. COVID negative. BNP 79.    Plan   - will treat for RML pneumonia and asthma exacerbation   - Continue Ceftriazone and Azithromycin   - f/u sputum and blood cultures  - Duo-nebs Q6H prn  - continue home Fluticasone - vilanterol   - Prednisone 40 mg daily, received steroid injection at urgent care   - IV Mg  - Wean oxygen, keep sats >92%

## 2020-09-29 NOTE — PROGRESS NOTES
Nutrition-Related Diabetes Education    Time Spent: 15 minutes  Learners: Pt and     Current HbA1c: 9.3  Is patient aware of their A1c and their goal A1c?  yes  Home diabetes medication(s): insulin aspart    Nutrition Education with handouts: Meal Planning Using the Plate Method handout    Comments: Pt was able to identify CHO containing foods. Reviewed all CHO containing foods w/ pt. Encouraged pt not to skip meals to maintain blood sugar throughout the day. Encouraged fresh f/v as much as possible. Encouraged pt to limit sugary beverages and consume water. Identified CHO choices and the serving sizes for 1 CHO choice w/ pt. Pt seems to be eating well and has no significant wt changes. Pt is well nourished.     Barriers to Learning: n/a    Follow up: Yes    Please consult as needed.  Thank you!

## 2020-09-29 NOTE — PROGRESS NOTES
[facility] Hospital Medicine  Progress Note    Patient Name: Sussy Costa  MRN: [id number]  Patient Class: IP- Inpatient   Admission Date: 9/28/2020  Length of Stay: 1 days  Attending Physician: Denny Livingston MD  Primary Care Provider: EZ Casillas MD    [facility] Medicine Team: [facility] 2 Brian Lentz MD    Subjective:     Principal Problem:Acute hypoxemic respiratory failure        HPI:  Sussy Costa is an 84 year old female with HTN, DM2, asthma, and hyperlipidemia who presents with a 7 day history of shortness of breath, cough, wheezing, and some sputum production. The shortness of breath is with walking and mild exertion. Denies any shortness of breath at rest. Does not use home oxygen. Has been coughing up some grey sputum the past few days. Over the past 3 days, patient has had to use her inhaler more frequently. Patient was initially seen at Urgent Care and then was told to go to ED. At the Urgent Care, she was given Duo-nebs and a steroid injection. CXR was completed there and concerning for RML pneumonia. Patient is COVID negative. Her  was recently admitted for pneumonia and has been visiting him in the hospital. Denies any recent abx use. Denies any recent travel. Patient denies fevers, chills, chest pain, abdominal pain, and N/V/D.      Overview/Hospital Course:  Admitted to hospital medicine on 9/28/20 with acute hypoxic respiratory failure 2/2 RML pneumonia and asthma exacerbation. Treated with ceftriaxone, azithromycin, prednisone, duo-nebs, and home Breo Ellipta.    Interval History: Shortness of breath improving. Currently on 2 L NC. IV lasix 40 mg given once as patient slightly volume overloaded and hyperkalemic.     Review of Systems   Constitutional: Negative for chills, fatigue and fever.   HENT: Negative for congestion, sinus pressure and sinus pain.    Eyes: Negative for discharge, itching and visual disturbance.   Respiratory: Positive for cough,  shortness of breath and wheezing. Negative for choking and chest tightness.    Cardiovascular: Positive for leg swelling. Negative for chest pain and palpitations.   Gastrointestinal: Negative for abdominal pain, constipation, diarrhea, nausea and vomiting.   Endocrine: Negative for polydipsia and polyuria.   Genitourinary: Negative for dysuria and flank pain.   Musculoskeletal: Negative for arthralgias and back pain.   Skin: Negative.    Neurological: Negative for dizziness, light-headedness and headaches.   Psychiatric/Behavioral: Negative for agitation and confusion.     Objective:     Vital Signs (Most Recent):  Temp: 97.5 °F (36.4 °C) (09/29/20 1100)  Pulse: 109 (09/29/20 1100)  Resp: 20 (09/29/20 1100)  BP: (!) 168/91 (09/29/20 1100)  SpO2: (!) 94 % (09/29/20 1100) Vital Signs (24h Range):  Temp:  [96.6 °F (35.9 °C)-98 °F (36.7 °C)] 97.5 °F (36.4 °C)  Pulse:  [] 109  Resp:  [16-26] 20  SpO2:  [94 %-98 %] 94 %  BP: (139-198)/() 168/91     Weight: 57.2 kg (126 lb 1.7 oz)  Body mass index is 20.98 kg/m².    Intake/Output Summary (Last 24 hours) at 9/29/2020 1358  Last data filed at 9/29/2020 1259  Gross per 24 hour   Intake 490 ml   Output 900 ml   Net -410 ml      Physical Exam  Vitals signs and nursing note reviewed.   Constitutional:       Appearance: Normal appearance.   HENT:      Head: Normocephalic and atraumatic.      Mouth/Throat:      Mouth: Mucous membranes are dry.   Eyes:      General: No scleral icterus.     Extraocular Movements: Extraocular movements intact.   Neck:      Musculoskeletal: Normal range of motion and neck supple.   Cardiovascular:      Rate and Rhythm: Regular rhythm. Tachycardia present.   Pulmonary:      Effort: Pulmonary effort is normal.      Comments: Wheezes and course breath sounds bilaterally  On 2 L NC  Abdominal:      General: There is no distension.      Palpations: Abdomen is soft.   Musculoskeletal: Normal range of motion.      Comments: 1+ pitting edema to  mid shins bilaterally   Skin:     General: Skin is warm and dry.      Capillary Refill: Capillary refill takes less than 2 seconds.   Neurological:      Mental Status: She is alert.         Significant Labs: All pertinent labs within the past 24 hours have been reviewed.    Significant Imaging: I have reviewed all pertinent imaging results/findings within the past 24 hours.      Assessment/Plan:      * Acute hypoxemic respiratory failure  Sussy Costa is an 84 year old female with HTN, DM2, asthma, and hyperlipidemia who presents with shortness of breath and wheezing. CXR concerning for RML pneumonia from Urgent Care. Patient afebrile and hemodynamically stable on arrival. Requiring 2 L NC and given Duonebs. WBC 13.1 and lactate wnl. COVID negative. BNP 79.    Plan   - will treat for RML pneumonia and asthma exacerbation   - Continue Ceftriazone and Azithromycin   - f/u sputum and blood cultures  - Duo-nebs Q6H prn  - continue home Fluticasone - vilanterol   - Prednisone 40 mg daily, received steroid injection at urgent care   - IV Mg  - Wean oxygen, keep sats >92%      Pneumonia of right middle lobe due to infectious organism  - see acute hypoxic respiratory failure      Asthma with exacerbation  - see acute hypoxic respiratory failure      New onset a-fib  - New onset Afib on 9/28/20 seen on EKG  - Patient denies prior history of Afib  - CHADs-VASc of 5  - Briefly discussed anticoagulation with patient and she wants to hold off for now. Can re-address as an outpatient.  - DVT prophylaxis for now   - keep K>4 and Mg>2  - TSH wnl       Hypertension  - continue home Irbesartan 300 mg daily     Dyslipidemia associated with type 2 diabetes mellitus  - continue statin      Diabetes mellitus, type II  - Takes Amaryl at home  - A1c 9.3%  - Levemir 5 units daily and LDSSI  - Diabetic/Cardiac diet          VTE Risk Mitigation (From admission, onward)         Ordered     enoxaparin injection 40 mg  Every 24 hours       09/28/20 1835     IP VTE HIGH RISK PATIENT  Once      09/28/20 1835     Place sequential compression device  Until discontinued      09/28/20 1835                Discharge Planning   ANTOINE: 9/30/2020     Code Status: Full Code   Is the patient medically ready for discharge?:     Reason for patient still in hospital (select all that apply): Patient unstable, Patient new problem, Patient trending condition, Laboratory test, Treatment, Imaging and PT / OT recommendations  Discharge Plan A: Home with family   Discharge Delays: None known at this time              Brian Lentz MD  Department of Hospital Medicine   [facility]

## 2020-09-29 NOTE — HOSPITAL COURSE
Admitted to hospital medicine on 9/28/20 with acute hypoxic respiratory failure 2/2 RML pneumonia and asthma exacerbation. Treated with ceftriaxone, azithromycin, prednisone, duo-nebs, and home Breo Ellipta. Episode of tachycardia and PACs, possible A-fib, patient is rate controlled w/out medications, CHADSVASC>2 however does not wish to be on anti-coagulation. Abx switched to PO doxycycline, improved work of breathing, wheezing, stating on RA at discharge. Course c/b asymptomatic hyperglycemia and hypertension, improved control prior to d/c. Starting amlodipine 5 mg qd and long-acting insulin 10 U qhs at discharge to be reassessed by pcp.

## 2020-09-29 NOTE — ASSESSMENT & PLAN NOTE
Sussy Costa is an 84 year old female with HTN, DM2, asthma, and hyperlipidemia who presents with shortness of breath and wheezing. CXR concerning for RML pneumonia from Urgent Care. Patient afebrile and hemodynamically stable on arrival. Requiring 2 L NC and given Duonebs. WBC 13.1 and lactate wnl. COVID negative. BNP 79.    Plan   - will treat for RML pneumonia and asthma exacerbation   - Continue Ceftriazone and Azithromycin   - f/u sputum and blood cultures  - Duo-nebs Q6H prn  - continue home Fluticasone - vilanterol   - Prednisone 40 mg daily starting tomorrow, received steroid injection at urgent care   - IV Mg

## 2020-09-29 NOTE — ASSESSMENT & PLAN NOTE
- Takes Amaryl at home  - A1c 9.3%  - LDSSI for now and will add on basal if needed  - Diabetic/Cardiac diet

## 2020-09-29 NOTE — H&P
[facility] Hospital Medicine  History & Physical    Patient Name: Sussy Costa  MRN: [id number]  Admission Date: 9/28/2020  Attending Physician: Denny Livingston MD   Primary Care Provider: EZ Casillas MD    [facility] Medicine Team: [facility] 2 Brian Lentz MD     Patient information was obtained from patient, past medical records and ER records.     Subjective:     Principal Problem:Acute hypoxemic respiratory failure    Chief Complaint:   Chief Complaint   Patient presents with    Shortness of Breath     sent from urgent care for possible pneumonia and SOb; received 2 duonebs at urgent care        HPI: Sussy Costa is an 84 year old female with HTN, DM2, asthma, and hyperlipidemia who presents with a 7 day history of shortness of breath, cough, wheezing, and some sputum production. The shortness of breath is with walking and mild exertion. Denies any shortness of breath at rest. Does not use home oxygen. Has been coughing up some grey sputum the past few days. Over the past 3 days, patient has had to use her inhaler more frequently. Patient was initially seen at Urgent Care and then was told to go to ED. At the Urgent Care, she was given Duo-nebs and a steroid injection. CXR was completed there and concerning for RML pneumonia. Patient is COVID negative. Her  was recently admitted for pneumonia and has been visiting him in the hospital. Denies any recent abx use. Denies any recent travel. Patient denies fevers, chills, chest pain, abdominal pain, and N/V/D.    Past Medical History:   Diagnosis Date    Asthma     Cyst of pancreas     liver    Diabetes mellitus type II     Eczema of hand     Glaucoma     Hyperlipidemia     Hypertension     Lichen planus     gums    Osteoporosis     Pulmonary nodules        Past Surgical History:   Procedure Laterality Date    CATARACT EXTRACTION, BILATERAL      CHOLECYSTECTOMY      EPIDURAL STEROID INJECTION INTO LUMBAR SPINE  N/A 11/8/2018    Procedure: Injection-steroid-epidural-lumbar L5-S1;  Surgeon: Nicci Osorio Jr., MD;  Location: [facility] PAIN [facility]GT;  Service: Pain Management;  Laterality: N/A;    FUNCTIONAL ENDOSCOPIC SINUS SURGERY (FESS) USING COMPUTER-ASSISTED NAVIGATION N/A 6/17/2019    Procedure: FESS, USING COMPUTER-ASSISTED NAVIGATION;  Surgeon: Rosangela Isabel MD;  Location: [facility] OR;  Service: ENT;  Laterality: N/A;  video    HYSTERECTOMY      nasal polyps         Review of patient's allergies indicates:   Allergen Reactions    Aspirin Other (See Comments)     Asthma    TRIAD OF NASAL POLYPS, ASA  ALLERGY AND ASTHMA.        Aspirin Other (See Comments)    Nsaids (non-steroidal anti-inflammatory drug) Other (See Comments)     AVOID DUE TO TRIAD OF ASA ALLERGY, NASAL POLYPS,AND ASTHMA    Penicillin      Other reaction(s): Rash    Penicillin g Other (See Comments)       Current Facility-Administered Medications on File Prior to Encounter   Medication    [COMPLETED] albuterol nebulizer solution 2.5 mg    [COMPLETED] betamethasone acetate-betamethasone sodium phosphate injection 6 mg    [COMPLETED] ipratropium 0.02 % nebulizer solution 0.5 mg     Current Outpatient Medications on File Prior to Encounter   Medication Sig    albuterol (ACCUNEB) 1.25 mg/3 mL Nebu Take 3 mLs (1.25 mg total) by nebulization every 6 (six) hours as needed. Rescue    albuterol (PROAIR HFA) 90 mcg/actuation inhaler Inhale 2 puffs into the lungs every 6 (six) hours as needed for Wheezing. Rescue    albuterol (VENTOLIN HFA) 90 mcg/actuation inhaler INHALE 2 PUFFS INTO THE LUNGS EVERY 6 HOURS AS NEEDED    atorvastatin (LIPITOR) 20 MG tablet TAKE 1 TABLET BY MOUTH DAILY IN THE EVENING FOR CHOLESTEROL    blood sugar diagnostic Strp 1 strip by Misc.(Non-Drug; Combo Route) route once daily.    chlorhexidine (PERIDEX) 0.12 % solution     fluticasone-salmeterol diskus inhaler 500-50 mcg Inhale 1 puff into the lungs 2 (two) times daily.     glimepiride (AMARYL) 2 MG tablet TAKE ONE TABLET BY MOUTH IN THE MORNING WITH BREAKFAST    irbesartan (AVAPRO) 300 MG tablet Take 1 tablet (300 mg total) by mouth every evening.    lancets (MICROLET LANCET) Misc Check blood sugar 2 times daily    latanoprost (XALATAN) 0.005 % ophthalmic solution Inject into the eye. 1 Drops Ophthalmic Every evening    levalbuterol (XOPENEX) 0.63 mg/3 mL nebulizer solution Take 3 mLs (0.63 mg total) by nebulization 3 (three) times daily as needed for Wheezing.    levocetirizine (XYZAL) 5 MG tablet Take 1 tablet (5 mg total) by mouth every evening.    levoFLOXacin (LEVAQUIN) 500 MG tablet Take 1 tablet (500 mg total) by mouth once daily.    magnesium 30 mg Tab Take 1 tablet by mouth once daily.     montelukast (SINGULAIR) 10 mg tablet Take 1 tablet (10 mg total) by mouth nightly.    mupirocin, bulk, 100 % Powd     NEILMED SINUS RINSE REFILL Pack use as directed    nitrofurantoin (MACRODANTIN) 25 MG Cap     olopatadine (PATANOL) 0.1 % ophthalmic solution Place 1 drop into both eyes 2 (two) times daily as needed. 1 Drops Ophthalmic Twice a day     risedronate (ACTONEL) 35 MG tablet TAKE 1 TABLET BY MOUTH EVERY 7 DAYS    vitamin D 185 MG Tab Take 185 mg by mouth once daily.      [DISCONTINUED] budesonide (PULMICORT) 0.5 mg/2 mL nebulizer solution     [DISCONTINUED] budesonide 1 mg/2 mL NbSp EMPTY CONTENTS OF 1 RESPULE INTO NASAL IRRIGATION SYSTEM, ADD DISTILLED WATER, SALT PACK, MIX & IRRIGATE. PERFORM TWICE DAILY    [DISCONTINUED] predniSONE (DELTASONE) 10 MG tablet 3 po qd x 3d, 2 qd x 3d (Patient not taking: Reported on 5/29/2020)     Family History     Problem Relation (Age of Onset)    Heart disease Father, Brother    Hypertension Brother        Tobacco Use    Smoking status: Never Smoker    Smokeless tobacco: Never Used   Substance and Sexual Activity    Alcohol use: Yes     Comment: socially    Drug use: No    Sexual activity: Yes     Partners: Male      Birth control/protection: Post-menopausal     Review of Systems   Constitutional: Negative for chills, fatigue and fever.   HENT: Negative for congestion, sinus pressure and sinus pain.    Eyes: Negative for discharge, itching and visual disturbance.   Respiratory: Positive for cough, shortness of breath and wheezing. Negative for choking and chest tightness.    Cardiovascular: Positive for leg swelling. Negative for chest pain and palpitations.   Gastrointestinal: Negative for abdominal pain, constipation, diarrhea, nausea and vomiting.   Endocrine: Negative for polydipsia and polyuria.   Genitourinary: Negative for dysuria and flank pain.   Musculoskeletal: Negative for arthralgias and back pain.   Skin: Negative.    Neurological: Negative for dizziness, light-headedness and headaches.   Psychiatric/Behavioral: Negative for agitation and confusion.     Objective:     Vital Signs (Most Recent):  Temp: 96.6 °F (35.9 °C) (09/28/20 1506)  Pulse: 81 (09/28/20 1825)  Resp: 19 (09/28/20 1825)  BP: (!) 151/97 (09/28/20 1610)  SpO2: (!) 94 % (09/28/20 1825) Vital Signs (24h Range):  Temp:  [96.6 °F (35.9 °C)-97.9 °F (36.6 °C)] 96.6 °F (35.9 °C)  Pulse:  [] 81  Resp:  [19-26] 19  SpO2:  [88 %-96 %] 94 %  BP: (151-198)/() 151/97     Weight: 64 kg (141 lb)  Body mass index is 23.46 kg/m².    Physical Exam  Vitals signs and nursing note reviewed.   Constitutional:       Appearance: Normal appearance.   HENT:      Head: Normocephalic and atraumatic.      Mouth/Throat:      Mouth: Mucous membranes are dry.   Eyes:      General: No scleral icterus.     Extraocular Movements: Extraocular movements intact.   Neck:      Musculoskeletal: Normal range of motion and neck supple.   Cardiovascular:      Rate and Rhythm: Regular rhythm. Tachycardia present.   Pulmonary:      Effort: Pulmonary effort is normal.      Comments: Wheezes and course breath sounds bilaterally  On 2 L NC  Abdominal:      General: There is no  distension.      Palpations: Abdomen is soft.   Musculoskeletal: Normal range of motion.      Comments: 1+ pitting edema to mid shins bilaterally   Skin:     General: Skin is warm and dry.      Capillary Refill: Capillary refill takes less than 2 seconds.   Neurological:      Mental Status: She is alert.             Significant Labs: All pertinent labs within the past 24 hours have been reviewed.    Significant Imaging: I have reviewed all pertinent imaging results/findings within the past 24 hours.    Assessment/Plan:     * Acute hypoxemic respiratory failure  Sussy Costa is an 84 year old female with HTN, DM2, asthma, and hyperlipidemia who presents with shortness of breath and wheezing. CXR concerning for RML pneumonia from Urgent Care. Patient afebrile and hemodynamically stable on arrival. Requiring 2 L NC and given Duonebs. WBC 13.1 and lactate wnl. COVID negative. BNP 79.    Plan   - will treat for RML pneumonia and asthma exacerbation   - Continue Ceftriazone and Azithromycin   - f/u sputum and blood cultures  - Duo-nebs Q6H prn  - continue home Fluticasone - vilanterol   - Prednisone 40 mg daily starting tomorrow, received steroid injection at urgent care   - IV Mg      Pneumonia of right middle lobe due to infectious organism  - see acute hypoxic respiratory failure      Asthma with exacerbation  - see acute hypoxic respiratory failure      New onset a-fib  - New onset Afib on 9/28/20 seen on EKG  - Patient denies prior history of Afib  - CHADs-VASc of 5  - Briefly discussed anticoagulation with patient and she wants to hold off for now. Can re-address as an outpatient.  - DVT prophylaxis for now   - keep K>4 and Mg>2  - check TSH      Hypertension  - continue home Irbesartan 300 mg daily     Dyslipidemia associated with type 2 diabetes mellitus  - continue statin      Diabetes mellitus, type II  - Takes Amaryl at home  - A1c 9.3%  - LDSSI for now and will add on basal if needed  - Diabetic/Cardiac  diet        VTE Risk Mitigation (From admission, onward)         Ordered     enoxaparin injection 40 mg  Every 24 hours      09/28/20 1835     IP VTE HIGH RISK PATIENT  Once      09/28/20 1835     Place sequential compression device  Until discontinued      09/28/20 1835                   Brian Lentz MD  Department of Hospital Medicine   [facility]

## 2020-09-29 NOTE — SUBJECTIVE & OBJECTIVE
Interval History: Shortness of breath improving. Currently on 2 L NC. IV lasix 40 mg given once as patient slightly volume overloaded and hyperkalemic.     Review of Systems   Constitutional: Negative for chills, fatigue and fever.   HENT: Negative for congestion, sinus pressure and sinus pain.    Eyes: Negative for discharge, itching and visual disturbance.   Respiratory: Positive for cough, shortness of breath and wheezing. Negative for choking and chest tightness.    Cardiovascular: Positive for leg swelling. Negative for chest pain and palpitations.   Gastrointestinal: Negative for abdominal pain, constipation, diarrhea, nausea and vomiting.   Endocrine: Negative for polydipsia and polyuria.   Genitourinary: Negative for dysuria and flank pain.   Musculoskeletal: Negative for arthralgias and back pain.   Skin: Negative.    Neurological: Negative for dizziness, light-headedness and headaches.   Psychiatric/Behavioral: Negative for agitation and confusion.     Objective:     Vital Signs (Most Recent):  Temp: 97.5 °F (36.4 °C) (09/29/20 1100)  Pulse: 109 (09/29/20 1100)  Resp: 20 (09/29/20 1100)  BP: (!) 168/91 (09/29/20 1100)  SpO2: (!) 94 % (09/29/20 1100) Vital Signs (24h Range):  Temp:  [96.6 °F (35.9 °C)-98 °F (36.7 °C)] 97.5 °F (36.4 °C)  Pulse:  [] 109  Resp:  [16-26] 20  SpO2:  [94 %-98 %] 94 %  BP: (139-198)/() 168/91     Weight: 57.2 kg (126 lb 1.7 oz)  Body mass index is 20.98 kg/m².    Intake/Output Summary (Last 24 hours) at 9/29/2020 1358  Last data filed at 9/29/2020 1259  Gross per 24 hour   Intake 490 ml   Output 900 ml   Net -410 ml      Physical Exam  Vitals signs and nursing note reviewed.   Constitutional:       Appearance: Normal appearance.   HENT:      Head: Normocephalic and atraumatic.      Mouth/Throat:      Mouth: Mucous membranes are dry.   Eyes:      General: No scleral icterus.     Extraocular Movements: Extraocular movements intact.   Neck:      Musculoskeletal: Normal  range of motion and neck supple.   Cardiovascular:      Rate and Rhythm: Regular rhythm. Tachycardia present.   Pulmonary:      Effort: Pulmonary effort is normal.      Comments: Wheezes and course breath sounds bilaterally  On 2 L NC  Abdominal:      General: There is no distension.      Palpations: Abdomen is soft.   Musculoskeletal: Normal range of motion.      Comments: 1+ pitting edema to mid shins bilaterally   Skin:     General: Skin is warm and dry.      Capillary Refill: Capillary refill takes less than 2 seconds.   Neurological:      Mental Status: She is alert.         Significant Labs: All pertinent labs within the past 24 hours have been reviewed.    Significant Imaging: I have reviewed all pertinent imaging results/findings within the past 24 hours.

## 2020-09-29 NOTE — ASSESSMENT & PLAN NOTE
- New onset Afib on 9/28/20 seen on EKG  - Patient denies prior history of Afib  - Briefly discussed anticoagulation with patient and she wants to hold off for now. Can re-address as an outpatient.  - CHADs-VASc of 5  - keep K>4 and Mg>2  - check TSH

## 2020-09-29 NOTE — PROGRESS NOTES
[facility] Hospital Medicine  Progress Note    Patient Name: Sussy Costa  MRN: [id number]  Patient Class: IP- Inpatient   Admission Date: 9/28/2020  Length of Stay: 1 days  Attending Physician: Denny Livingston MD  Primary Care Provider: EZ Casillas MD    [facility] Medicine Team: [facility] 2 Brian Lentz MD    Subjective:     Principal Problem:Acute hypoxemic respiratory failure        HPI:  Sussy Costa is an 84 year old female with HTN, DM2, asthma, and hyperlipidemia who presents with a 7 day history of shortness of breath, cough, wheezing, and some sputum production. The shortness of breath is with walking and mild exertion. Denies any shortness of breath at rest. Does not use home oxygen. Has been coughing up some grey sputum the past few days. Over the past 3 days, patient has had to use her inhaler more frequently. Patient was initially seen at Urgent Care and then was told to go to ED. At the Urgent Care, she was given Duo-nebs and a steroid injection. CXR was completed there and concerning for RML pneumonia. Patient is COVID negative. Her  was recently admitted for pneumonia and has been visiting him in the hospital. Denies any recent abx use. Denies any recent travel. Patient denies fevers, chills, chest pain, abdominal pain, and N/V/D.      Overview/Hospital Course:  Admitted to hospital medicine on 9/28/20 with acute hypoxic respiratory failure 2/2 RML pneumonia and asthma exacerbation. Treated with ceftriaxone, azithromycin, prednisone, duo-nebs, and home Breo Ellipta.    Interval History: Shortness of breath improving. Currently on 2 L NC. IV lasix 40 mg given once as patient slightly volume overloaded and hyperkalemic.     Review of Systems   Constitutional: Negative for chills, fatigue and fever.   HENT: Negative for congestion, sinus pressure and sinus pain.    Eyes: Negative for discharge, itching and visual disturbance.   Respiratory: Positive for cough,  shortness of breath and wheezing. Negative for choking and chest tightness.    Cardiovascular: Positive for leg swelling. Negative for chest pain and palpitations.   Gastrointestinal: Negative for abdominal pain, constipation, diarrhea, nausea and vomiting.   Endocrine: Negative for polydipsia and polyuria.   Genitourinary: Negative for dysuria and flank pain.   Musculoskeletal: Negative for arthralgias and back pain.   Skin: Negative.    Neurological: Negative for dizziness, light-headedness and headaches.   Psychiatric/Behavioral: Negative for agitation and confusion.     Objective:     Vital Signs (Most Recent):  Temp: 97.5 °F (36.4 °C) (09/29/20 1100)  Pulse: 109 (09/29/20 1100)  Resp: 20 (09/29/20 1100)  BP: (!) 168/91 (09/29/20 1100)  SpO2: (!) 94 % (09/29/20 1100) Vital Signs (24h Range):  Temp:  [96.6 °F (35.9 °C)-98 °F (36.7 °C)] 97.5 °F (36.4 °C)  Pulse:  [] 109  Resp:  [16-26] 20  SpO2:  [94 %-98 %] 94 %  BP: (139-198)/() 168/91     Weight: 57.2 kg (126 lb 1.7 oz)  Body mass index is 20.98 kg/m².    Intake/Output Summary (Last 24 hours) at 9/29/2020 1358  Last data filed at 9/29/2020 1259  Gross per 24 hour   Intake 490 ml   Output 900 ml   Net -410 ml      Physical Exam  Vitals signs and nursing note reviewed.   Constitutional:       Appearance: Normal appearance.   HENT:      Head: Normocephalic and atraumatic.      Mouth/Throat:      Mouth: Mucous membranes are dry.   Eyes:      General: No scleral icterus.     Extraocular Movements: Extraocular movements intact.   Neck:      Musculoskeletal: Normal range of motion and neck supple.   Cardiovascular:      Rate and Rhythm: Regular rhythm. Tachycardia present.   Pulmonary:      Effort: Pulmonary effort is normal.      Comments: Wheezes and course breath sounds bilaterally  On 2 L NC  Abdominal:      General: There is no distension.      Palpations: Abdomen is soft.   Musculoskeletal: Normal range of motion.      Comments: 1+ pitting edema to  mid shins bilaterally   Skin:     General: Skin is warm and dry.      Capillary Refill: Capillary refill takes less than 2 seconds.   Neurological:      Mental Status: She is alert.         Significant Labs: All pertinent labs within the past 24 hours have been reviewed.    Significant Imaging: I have reviewed all pertinent imaging results/findings within the past 24 hours.      Assessment/Plan:      * Acute hypoxemic respiratory failure  Sussy Costa is an 84 year old female with HTN, DM2, asthma, and hyperlipidemia who presents with shortness of breath and wheezing. CXR concerning for RML pneumonia from Urgent Care. Patient afebrile and hemodynamically stable on arrival. Requiring 2 L NC and given Duonebs. WBC 13.1 and lactate wnl. COVID negative. BNP 79.    Plan   - will treat for RML pneumonia and asthma exacerbation   - Continue Ceftriazone and Azithromycin   - f/u sputum and blood cultures  - Duo-nebs Q6H prn  - continue home Fluticasone - vilanterol   - Prednisone 40 mg daily, received steroid injection at urgent care   - IV Mg  - Wean oxygen, keep sats >92%      Pneumonia of right middle lobe due to infectious organism  - see acute hypoxic respiratory failure      Asthma with exacerbation  - see acute hypoxic respiratory failure      New onset a-fib  - New onset Afib on 9/28/20 seen on EKG  - Patient denies prior history of Afib  - CHADs-VASc of 5  - Briefly discussed anticoagulation with patient and she wants to hold off for now. Can re-address as an outpatient.  - DVT prophylaxis for now   - keep K>4 and Mg>2  - TSH wnl       Hypertension  - continue home Irbesartan 300 mg daily     Dyslipidemia associated with type 2 diabetes mellitus  - continue statin      Diabetes mellitus, type II  - Takes Amaryl at home  - A1c 9.3%  - Levemir 5 units daily and LDSSI  - Diabetic/Cardiac diet          VTE Risk Mitigation (From admission, onward)         Ordered     enoxaparin injection 40 mg  Every 24 hours       09/28/20 1835     IP VTE HIGH RISK PATIENT  Once      09/28/20 1835     Place sequential compression device  Until discontinued      09/28/20 1835                Discharge Planning   ANTOINE: 9/30/2020     Code Status: Full Code   Is the patient medically ready for discharge?:     Reason for patient still in hospital (select all that apply): Patient unstable, Patient new problem, Patient trending condition, Treatment, Imaging and PT / OT recommendations  Discharge Plan A: Home with family   Discharge Delays: None known at this time              Brian Lentz MD  Department of Hospital Medicine   [facility]

## 2020-09-29 NOTE — PLAN OF CARE
Overnight, blood glucose monitoring performed. SSI given. Pt on 2L nasal cannula. PRN hydralazine administered for HTN with mild relief noted. Pure wick intact. Call light in reach. Pt oriented to room. CABRERA

## 2020-09-29 NOTE — ED NOTES
Pt transported upstairs on stretcher by escort. NAD. HEAVENLY. All belongings with pt. Pt wearing mask

## 2020-09-29 NOTE — ASSESSMENT & PLAN NOTE
- Takes Amaryl at home  - A1c 9.3%  - Levemir 5 units daily and LDSSI  - Diabetic/Cardiac diet

## 2020-09-29 NOTE — SUBJECTIVE & OBJECTIVE
Past Medical History:   Diagnosis Date    Asthma     Cyst of pancreas     liver    Diabetes mellitus type II     Eczema of hand     Glaucoma     Hyperlipidemia     Hypertension     Lichen planus     gums    Osteoporosis     Pulmonary nodules        Past Surgical History:   Procedure Laterality Date    CATARACT EXTRACTION, BILATERAL      CHOLECYSTECTOMY      EPIDURAL STEROID INJECTION INTO LUMBAR SPINE N/A 11/8/2018    Procedure: Injection-steroid-epidural-lumbar L5-S1;  Surgeon: Nicci Osorio Jr., MD;  Location: [facility]GT;  Service: Pain Management;  Laterality: N/A;    FUNCTIONAL ENDOSCOPIC SINUS SURGERY (FESS) USING COMPUTER-ASSISTED NAVIGATION N/A 6/17/2019    Procedure: FESS, USING COMPUTER-ASSISTED NAVIGATION;  Surgeon: Rosangela Isabel MD;  Location: [facility] OR;  Service: ENT;  Laterality: N/A;  video    HYSTERECTOMY      nasal polyps         Review of patient's allergies indicates:   Allergen Reactions    Aspirin Other (See Comments)     Asthma    TRIAD OF NASAL POLYPS, ASA  ALLERGY AND ASTHMA.        Aspirin Other (See Comments)    Nsaids (non-steroidal anti-inflammatory drug) Other (See Comments)     AVOID DUE TO TRIAD OF ASA ALLERGY, NASAL POLYPS,AND ASTHMA    Penicillin      Other reaction(s): Rash    Penicillin g Other (See Comments)       Current Facility-Administered Medications on File Prior to Encounter   Medication    [COMPLETED] albuterol nebulizer solution 2.5 mg    [COMPLETED] betamethasone acetate-betamethasone sodium phosphate injection 6 mg    [COMPLETED] ipratropium 0.02 % nebulizer solution 0.5 mg     Current Outpatient Medications on File Prior to Encounter   Medication Sig    albuterol (ACCUNEB) 1.25 mg/3 mL Nebu Take 3 mLs (1.25 mg total) by nebulization every 6 (six) hours as needed. Rescue    albuterol (PROAIR HFA) 90 mcg/actuation inhaler Inhale 2 puffs into the lungs every 6 (six) hours as needed for Wheezing. Rescue    albuterol (VENTOLIN HFA)  90 mcg/actuation inhaler INHALE 2 PUFFS INTO THE LUNGS EVERY 6 HOURS AS NEEDED    atorvastatin (LIPITOR) 20 MG tablet TAKE 1 TABLET BY MOUTH DAILY IN THE EVENING FOR CHOLESTEROL    blood sugar diagnostic Strp 1 strip by Misc.(Non-Drug; Combo Route) route once daily.    chlorhexidine (PERIDEX) 0.12 % solution     fluticasone-salmeterol diskus inhaler 500-50 mcg Inhale 1 puff into the lungs 2 (two) times daily.    glimepiride (AMARYL) 2 MG tablet TAKE ONE TABLET BY MOUTH IN THE MORNING WITH BREAKFAST    irbesartan (AVAPRO) 300 MG tablet Take 1 tablet (300 mg total) by mouth every evening.    lancets (MICROLET LANCET) Misc Check blood sugar 2 times daily    latanoprost (XALATAN) 0.005 % ophthalmic solution Inject into the eye. 1 Drops Ophthalmic Every evening    levalbuterol (XOPENEX) 0.63 mg/3 mL nebulizer solution Take 3 mLs (0.63 mg total) by nebulization 3 (three) times daily as needed for Wheezing.    levocetirizine (XYZAL) 5 MG tablet Take 1 tablet (5 mg total) by mouth every evening.    levoFLOXacin (LEVAQUIN) 500 MG tablet Take 1 tablet (500 mg total) by mouth once daily.    magnesium 30 mg Tab Take 1 tablet by mouth once daily.     montelukast (SINGULAIR) 10 mg tablet Take 1 tablet (10 mg total) by mouth nightly.    mupirocin, bulk, 100 % Powd     NEILMED SINUS RINSE REFILL Pack use as directed    nitrofurantoin (MACRODANTIN) 25 MG Cap     olopatadine (PATANOL) 0.1 % ophthalmic solution Place 1 drop into both eyes 2 (two) times daily as needed. 1 Drops Ophthalmic Twice a day     risedronate (ACTONEL) 35 MG tablet TAKE 1 TABLET BY MOUTH EVERY 7 DAYS    vitamin D 185 MG Tab Take 185 mg by mouth once daily.      [DISCONTINUED] budesonide (PULMICORT) 0.5 mg/2 mL nebulizer solution     [DISCONTINUED] budesonide 1 mg/2 mL NbSp EMPTY CONTENTS OF 1 RESPULE INTO NASAL IRRIGATION SYSTEM, ADD DISTILLED WATER, SALT PACK, MIX & IRRIGATE. PERFORM TWICE DAILY    [DISCONTINUED] predniSONE (DELTASONE) 10  MG tablet 3 po qd x 3d, 2 qd x 3d (Patient not taking: Reported on 5/29/2020)     Family History     Problem Relation (Age of Onset)    Heart disease Father, Brother    Hypertension Brother        Tobacco Use    Smoking status: Never Smoker    Smokeless tobacco: Never Used   Substance and Sexual Activity    Alcohol use: Yes     Comment: socially    Drug use: No    Sexual activity: Yes     Partners: Male     Birth control/protection: Post-menopausal     Review of Systems   Constitutional: Negative for chills, fatigue and fever.   HENT: Negative for congestion, sinus pressure and sinus pain.    Eyes: Negative for discharge, itching and visual disturbance.   Respiratory: Positive for cough, shortness of breath and wheezing. Negative for choking and chest tightness.    Cardiovascular: Positive for leg swelling. Negative for chest pain and palpitations.   Gastrointestinal: Negative for abdominal pain, constipation, diarrhea, nausea and vomiting.   Endocrine: Negative for polydipsia and polyuria.   Genitourinary: Negative for dysuria and flank pain.   Musculoskeletal: Negative for arthralgias and back pain.   Skin: Negative.    Neurological: Negative for dizziness, light-headedness and headaches.   Psychiatric/Behavioral: Negative for agitation and confusion.     Objective:     Vital Signs (Most Recent):  Temp: 96.6 °F (35.9 °C) (09/28/20 1506)  Pulse: 81 (09/28/20 1825)  Resp: 19 (09/28/20 1825)  BP: (!) 151/97 (09/28/20 1610)  SpO2: (!) 94 % (09/28/20 1825) Vital Signs (24h Range):  Temp:  [96.6 °F (35.9 °C)-97.9 °F (36.6 °C)] 96.6 °F (35.9 °C)  Pulse:  [] 81  Resp:  [19-26] 19  SpO2:  [88 %-96 %] 94 %  BP: (151-198)/() 151/97     Weight: 64 kg (141 lb)  Body mass index is 23.46 kg/m².    Physical Exam  Vitals signs and nursing note reviewed.   Constitutional:       Appearance: Normal appearance.   HENT:      Head: Normocephalic and atraumatic.      Mouth/Throat:      Mouth: Mucous membranes are dry.    Eyes:      General: No scleral icterus.     Extraocular Movements: Extraocular movements intact.   Neck:      Musculoskeletal: Normal range of motion and neck supple.   Cardiovascular:      Rate and Rhythm: Regular rhythm. Tachycardia present.   Pulmonary:      Effort: Pulmonary effort is normal.      Comments: Wheezes and course breath sounds bilaterally  On 2 L NC  Abdominal:      General: There is no distension.      Palpations: Abdomen is soft.   Musculoskeletal: Normal range of motion.      Comments: 1+ pitting edema to mid shins bilaterally   Skin:     General: Skin is warm and dry.      Capillary Refill: Capillary refill takes less than 2 seconds.   Neurological:      Mental Status: She is alert.             Significant Labs: All pertinent labs within the past 24 hours have been reviewed.    Significant Imaging: I have reviewed all pertinent imaging results/findings within the past 24 hours.

## 2020-09-29 NOTE — PLAN OF CARE
Problem: Adult Inpatient Plan of Care  Goal: Plan of Care Review  Outcome: Ongoing, Progressing   POC reviewed with pt. VSS. Up in chair most of the day. BG elevated, prn and long acting insulin administered. Remains on 2L NC. No other changes. Safety maintained. Will continue to monitor.

## 2020-09-29 NOTE — ASSESSMENT & PLAN NOTE
- New onset Afib on 9/28/20 seen on EKG  - Patient denies prior history of Afib  - CHADs-VASc of 5  - Briefly discussed anticoagulation with patient and she wants to hold off for now. Can re-address as an outpatient.  - DVT prophylaxis for now   - keep K>4 and Mg>2  - TSH wnl

## 2020-09-29 NOTE — PT/OT/SLP EVAL
Occupational Therapy   Evaluation    Name: Sussy Costa  MRN: [id number]  Admitting Diagnosis:  Acute hypoxemic respiratory failure      Recommendations:     Discharge Recommendations: home health OT  Discharge Equipment Recommendations:  other (see comments)(TBD)  Barriers to discharge:  None    Assessment:     Sussy Costa is a 84 y.o. female with a medical diagnosis of Acute hypoxemic respiratory failure.  Pt with good participation and activity tolerance during assessment.  She performed functional transfers with SBA-CGA and ambulated a functional household distance (~120 ft) with CGA with no AD.  Pt performed all functional mobility with 2 L of portable oxygen.  Her oxygen saturation levels remained at 95% or above throughout the session.  However, her HR was consistently around 118-120 BPM, briefly increasing to 130 BPM before returning to 118.  Pt was able to urinate in the toilet and perform hygiene with supervision.  Due to her current level of function and her home environment/caregiver support, HHOT recommended at d/c for maximal pt gains in functional independence.  She presents with the following deficits. Performance deficits affecting function: weakness, impaired endurance, impaired self care skills, impaired functional mobilty, gait instability, impaired balance, impaired cardiopulmonary response to activity.      Rehab Prognosis: Good; patient would benefit from acute skilled OT services to address these deficits and reach maximum level of function.       Plan:     Patient to be seen 2 x/week to address the above listed problems via self-care/home management, therapeutic activities, therapeutic exercises  · Plan of Care Expires: 10/29/20  · Plan of Care Reviewed with: patient, spouse    Subjective   Pt was pleasant and cooperative throughout assessment.  Chief Complaint: SOB with activity  Patient/Family Comments/goals: to improve her endurance and return home     Occupational Profile:  Living  Environment: Pt lives with her  in a H with 1 threshold to enter.  She has a tub/shower combo with no DME and a raised toilet with a counter nearby to hold onto during transfers.  Pt reports no recent falls.   Previous level of function: IND with ADLs, IADLs, and functional mobility.  Pt is retired and still drives.   Roles and Routines: wife; pt enjoys needle points and cross-stitching   Equipment Used at Home:  raised toilet  Assistance upon Discharge: Her family ( primarily but daughter, granddaughter, and son-in-law can assist if needed)    Pain/Comfort:  · Pain Rating 1: 0/10  · Pain Rating Post-Intervention 1: 0/10    Patients cultural, spiritual, Adventism conflicts given the current situation: no    Objective:     Communicated with: RN and PT prior to session.  Patient found HOB elevated with telemetry, PureWick, oxygen(2 L of oxygen, Visi monitor) with her  present upon OT entry to room.    General Precautions: Standard, fall   Orthopedic Precautions:N/A   Braces: N/A     Occupational Performance:    Bed Mobility:    · Patient completed Rolling/Turning to Right with stand by assistance  · Patient completed Scooting/Bridging with stand by assistance  · Patient completed Supine to Sit with stand by assistance    Functional Mobility/Transfers:  · Patient completed Sit <> Stand Transfer from EOB with stand by assistance  with  no assistive device; Pt performed sit to stand from toilet with supervision (pt had forgotten to ask therapist for assistance)  · Patient completed Chair Transfer using Step Transfer technique with contact guard assistance with no assistive device  · Patient completed Toilet Transfer Step Transfer technique with contact guard assistance with grab bar  · Functional Mobility: Pt ambulated a functional household distance from EOB to the bathroom, out in the hallway, and back to bedside chair (~120 ft).  Pt with no LOB but required one standing rest break at half-way  point due to SOB.  All performed with 2 L of oxygen with tank in tow.    Activities of Daily Living:  · Upper Body Dressing: Assistance to tie/untie the back of her gown.  · Toileting: supervision to toilet and perform hygiene.  Pt able to urinate into toilet.     Cognitive/Visual Perceptual:  Cognitive/Psychosocial Skills:     -       Oriented to: Person, Place, Time and Situation   -       Follows Commands/attention:Follows one-step commands  -       Communication: clear/fluent    Physical Exam:  Dominant hand:    -       R-handed  Upper Extremity Range of Motion:     -       Right Upper Extremity: WFL  -       Left Upper Extremity: WFL  Upper Extremity Strength:    -       Right Upper Extremity: WFL  -       Left Upper Extremity: WFL   Strength:    -       Right Upper Extremity: WFL  -       Left Upper Extremity: WFL  Fine Motor Coordination:    -       Intact  Left hand thumb/finger opposition skills and Right hand thumb/finger opposition skills    AMPA 6 Click ADL:  AMPAC Total Score: 19    Treatment & Education:  - Pt and her  edu on role of OT, POC, safety when performing self care tasks, benefit of performing OOB activity, and safety when performing functional transfers and mobility.  - White board updated  - Self care tasks completed-- as noted above   Education:    Patient left up in chair with all lines intact, call button in reach and her  present    GOALS:   Multidisciplinary Problems     Occupational Therapy Goals        Problem: Occupational Therapy Goal    Goal Priority Disciplines Outcome Interventions   Occupational Therapy Goal     OT, PT/OT Ongoing, Progressing    Description: Goals to be met by: 10/13/2020    Patient will increase functional independence with ADLs by performing:    UE Dressing with Modified [place].  LE Dressing with Modified [place].  Grooming while standing at sink with Supervision.  Toileting from toilet with Modified [place] for hygiene and  clothing management.   Supine to sit with Modified [place].  Sit to stand transfer with Supervision with no AD.  Toilet transfer to toilet with Supervision with no AD.                     History:     Past Medical History:   Diagnosis Date    Asthma     Cyst of pancreas     liver    Diabetes mellitus type II     Eczema of hand     Glaucoma     Hyperlipidemia     Hypertension     Lichen planus     gums    Osteoporosis     Pulmonary nodules        Past Surgical History:   Procedure Laterality Date    CATARACT EXTRACTION, BILATERAL      CHOLECYSTECTOMY      EPIDURAL STEROID INJECTION INTO LUMBAR SPINE N/A 11/8/2018    Procedure: Injection-steroid-epidural-lumbar L5-S1;  Surgeon: Nicci Oosrio Jr., MD;  Location: [facility];  Service: Pain Management;  Laterality: N/A;    FUNCTIONAL ENDOSCOPIC SINUS SURGERY (FESS) USING COMPUTER-ASSISTED NAVIGATION N/A 6/17/2019    Procedure: FESS, USING COMPUTER-ASSISTED NAVIGATION;  Surgeon: Rosangela Isabel MD;  Location: [facility] OR;  Service: ENT;  Laterality: N/A;  video    HYSTERECTOMY      nasal polyps         Time Tracking:     OT Date of Treatment: 09/29/20  OT Start Time: 0955  OT Stop Time: 1013  OT Total Time (min): 18 min (co-eval with PT)    Billable Minutes:Evaluation 10 min.  Self Care/Home Management 8 min.    Nakul Edmondson, OT  9/29/2020

## 2020-09-29 NOTE — PLAN OF CARE
09/29/20 1317   Post-Acute Status   Post-Acute Authorization Other   Other Status No Post-Acute Service Needs   Discharge Delays None known at this time   Discharge Plan   Discharge Plan A Home with family   Discharge Plan B Home with family;Home Health

## 2020-09-29 NOTE — PLAN OF CARE
Problem: Physical Therapy Goal  Goal: Physical Therapy Goal  Description: Goals to be met by: 10/09/2020    Patient will increase functional independence with mobility by performin. Supine to sit with Modified [place]  2. Sit to stand transfer with Supervision  3. Gait  x 200 feet with Supervision using no AD while performing L/R and up/down head turns with no LOB.   4. Stand for 10 minutes with Supervision using no AD while reaching out of MOLLY in all planes to perform functional activities.  5. Lower extremity exercise program x15 reps per handout, with independence    Outcome: Ongoing, Progressing    Initial evaluation complete. Goals established based on patient's current level of function. Initiate POC.     NICA Quiroga  2020

## 2020-09-29 NOTE — PLAN OF CARE
Problem: Occupational Therapy Goal  Goal: Occupational Therapy Goal  Description: Goals to be met by: 10/13/2020    Patient will increase functional independence with ADLs by performing:    UE Dressing with Modified [place].  LE Dressing with Modified [place].  Grooming while standing at sink with Supervision.  Toileting from toilet with Modified [place] for hygiene and clothing management.   Supine to sit with Modified [place].  Sit to stand transfer with Supervision with no AD.  Toilet transfer to toilet with Supervision with no AD.    Outcome: Ongoing, Progressing    Pt evaluated and OT goals established.    Nakul Edmondson, OT   09/29/2020

## 2020-09-29 NOTE — PLAN OF CARE
CM to bedside - pt and spouse present; pt provided assessment info. Pt w/ glucometer and nebulizer in place, lives w/ spouse. Pt will likely d/c home w/ no needs. Pt's spouse discharged last week w/ PNA per pt.    CM provided patient anticipated ANTOINE which was written on the whiteboard and will be update by nursing staff.   Patient provided a Discharge Planning booklet. Patient verbalized understanding.    ERICK NI [id number] - assisting 8WT ERICK Bonnie [id number]     09/29/20 1318   Discharge Assessment   Assessment Type Discharge Planning Assessment   Confirmed/corrected address and phone number on facesheet? Yes   Assessment information obtained from? Patient;Caregiver   Expected Length of Stay (days) 2   Communicated expected length of stay with patient/caregiver yes   Prior to hospitilization cognitive status: Alert/Oriented   Prior to hospitalization functional status: Independent   Current cognitive status: Alert/Oriented   Current Functional Status: Needs Assistance   Facility Arrived From: N/A   Lives With spouse   Is patient able to care for self after discharge? Unable to determine at this time (comments)   Who are your caregiver(s) and their phone number(s)? spouse - [place] [phone]   Patient's perception of discharge disposition home or selfcare   Readmission Within the Last 30 Days no previous admission in last 30 days   Patient currently being followed by outpatient case management? No   Patient currently receives any other outside agency services? No   Equipment Currently Used at Home nebulizer;glucometer   Do you have any problems affording any of your prescribed medications? No   Is the patient taking medications as prescribed? yes   Does the patient have transportation home? Yes  (pt's spouse available to pickup pt at d/c)   Transportation Anticipated family or friend will provide   Dialysis Name and Scheduled days N/A   Does the patient receive services at the [facility]? No   Discharge Plan A Home  with family   Discharge Plan B Home with family;Home Health   DME Needed Upon Discharge  other (see comments)  (TBD)   Patient/Family in Agreement with Plan yes

## 2020-09-29 NOTE — PT/OT/SLP EVAL
Physical Therapy Evaluation    Patient Name:  Sussy Costa   MRN:  [id number]    Recommendations:     Discharge Recommendations:  home health PT   Discharge Equipment Recommendations: (TBD)   Barriers to discharge: None    Assessment:     Sussy Costa is a 84 y.o. female admitted with a medical diagnosis of Acute hypoxemic respiratory failure.  She presents with the following impairments/functional limitations:  weakness, impaired endurance, impaired functional mobilty, gait instability, impaired balance, impaired cardiopulmonary response to activity. Pt tolerated session well and is motivated to work with therapy. Her mobility is currently being limited due to decreased endurance, SOB, decreased activity tolerance, and slight tachycardia. SBA for bed mobility. Amb ~60 ft + ~60 ft with no AD and CGA with one rest break in between. Her O2 sats and HR were monitored closely during session. HR max was 120 bpm, and O2 sats stayed WNL. Upon d/c, PT recommends home health PT to address the above deficits and improve her overall safety.     Rehab Prognosis: Good; patient would benefit from acute skilled PT services to address these deficits and reach maximum level of function.    Recent Surgery: * No surgery found *      Plan:     During this hospitalization, patient to be seen 2 x/week to address the identified rehab impairments via gait training, therapeutic activities, therapeutic exercises, neuromuscular re-education and progress toward the following goals:    · Plan of Care Expires:  10/29/20    Subjective     Chief Complaint: did not state  Patient/Family Comments/goals: to return home  Pain/Comfort:  · Pain Rating 1: 0/10    Patients cultural, spiritual, Denominational conflicts given the current situation: no    Living Environment:  Pt lives with  in [facility] with no JAMESON. Bath/shower combo.  Prior to admission, patients level of function was independent. Does not work but currently drives. She reports no recent  falls. She enjoyed needle pointing as a hobby.  Equipment used at home: none.  DME owned (not currently used): none.  Upon discharge, patient will have assistance from family.  is present 24/7.     Objective:     Communicated with RN prior to session.  Patient found HOB elevated with telemetry, PureWick, oxygen(Visi)  upon PT entry to room.    General Precautions: Standard, fall   Orthopedic Precautions:N/A   Braces: N/A     Exams:    Cognitive Exam  Patient is A&O x4 and follows 100% of one -step commands    Fine Motor Coordination   -       WNL     Postural Exam Patient presented with the following abnormalities:    -       Rounded shoulders  -       Forward head  -       Kyphosis  -       Posterior pelvic tilt   Sensation    -       Light touch intact BLE   Skin Integrity/Edema     -       Skin integrity: visibly intact  -       Edema: NA   R LE ROM WNL   R LE Strength 4/5 hip flexion, knee ext/flex, and ankle DF/PF   L LE ROM WNL   L LE Strength  4/5 hip flexion, knee ext/flex, and ankle DF/PF       Balance   Static Sitting SBA   Dynamic Sitting SBA   Static Standing CGA   Dynamic Standing       CGA         Functional Mobility:    Bed Mobility  Rolling to R: SBA, HOB elevated, siderails used  Supine to Sit on the R side:  SBA, HOB elevated, siderails used  Scoot to EOB in sitting: SBA   Transfers Sit to Stand:  SBA   Gait  Gait Distance: ~60 ft + ~60 ft with no AD  Assistance Level: CGA  Description: reciprocal steps, decreased speed and step length, hips externally rotated (L > R), upright trunk, able to look R/L and up/down with no LOB but demo'd decrease in speed; reported 4/10 SOB after gait    Gait Training: verbal cues for scanning environment        Therapeutic Activities and Exercises:   -Pt safe to amb independently with RN x 1 assist. Whiteboard updated.   -Educated pt on safety with mobility and continuing to be mobile to prevent deconditioning.   -Discussed roles and goals of physical therapy  in the acute care setting.   -Educated pt on activity pacing and ways to deal/prevent SOB.  -Pt and  expressed understanding and agreement to all.     Left pt in chair with call light within reach. Instructed her to call for RN when ready to return to bed.    AM-PAC 6 CLICK MOBILITY  Total Score:22     Patient left up in chair with all lines intact and call button in reach.    GOALS:   Multidisciplinary Problems     Physical Therapy Goals        Problem: Physical Therapy Goal    Goal Priority Disciplines Outcome Goal Variances Interventions   Physical Therapy Goal     PT, PT/OT Ongoing, Progressing     Description: Goals to be met by: 10/09/2020    Patient will increase functional independence with mobility by performin. Supine to sit with Modified [place]  2. Sit to stand transfer with Supervision  3. Gait  x 200 feet with Supervision using no AD while performing L/R and up/down head turns with no LOB.   4. Stand for 10 minutes with Supervision using no AD while reaching out of MOLLY in all planes to perform functional activities.  5. Lower extremity exercise program x15 reps per handout, with independence                     History:     Past Medical History:   Diagnosis Date    Asthma     Cyst of pancreas     liver    Diabetes mellitus type II     Eczema of hand     Glaucoma     Hyperlipidemia     Hypertension     Lichen planus     gums    Osteoporosis     Pulmonary nodules        Past Surgical History:   Procedure Laterality Date    CATARACT EXTRACTION, BILATERAL      CHOLECYSTECTOMY      EPIDURAL STEROID INJECTION INTO LUMBAR SPINE N/A 2018    Procedure: Injection-steroid-epidural-lumbar L5-S1;  Surgeon: Nicci Osorio Jr., MD;  [facility];  Service: Pain Management;  Laterality: N/A;    FUNCTIONAL ENDOSCOPIC SINUS SURGERY (FESS) USING COMPUTER-ASSISTED NAVIGATION N/A 2019    Procedure: FESS, USING COMPUTER-ASSISTED NAVIGATION;  Surgeon: Rosangela Colin  MD [place];  Location: [facility];  Service: ENT;  Laterality: N/A;  video    HYSTERECTOMY      nasal polyps         Time Tracking:     PT Received On: 09/29/20  PT Start Time: 0955     PT Stop Time: 1013  PT Total Time (min): 18 min     Billable Minutes: Evaluation 18 (co-eval with OT)      Chelsy Aguirre, NICA  09/29/2020

## 2020-09-30 LAB
ALBUMIN SERPL BCP-MCNC: 3 G/DL (ref 3.5–5.2)
ALP SERPL-CCNC: 78 U/L (ref 55–135)
ALT SERPL W/O P-5'-P-CCNC: 18 U/L (ref 10–44)
ANION GAP SERPL CALC-SCNC: 10 MMOL/L (ref 8–16)
ANION GAP SERPL CALC-SCNC: 11 MMOL/L (ref 8–16)
AST SERPL-CCNC: 12 U/L (ref 10–40)
B-OH-BUTYR BLD STRIP-SCNC: 0.1 MMOL/L (ref 0–0.5)
BASOPHILS # BLD AUTO: 0.06 K/UL (ref 0–0.2)
BASOPHILS NFR BLD: 0.3 % (ref 0–1.9)
BILIRUB SERPL-MCNC: 0.5 MG/DL (ref 0.1–1)
BUN SERPL-MCNC: 27 MG/DL (ref 8–23)
BUN SERPL-MCNC: 37 MG/DL (ref 8–23)
CALCIUM SERPL-MCNC: 9.6 MG/DL (ref 8.7–10.5)
CALCIUM SERPL-MCNC: 9.9 MG/DL (ref 8.7–10.5)
CHLORIDE SERPL-SCNC: 101 MMOL/L (ref 95–110)
CHLORIDE SERPL-SCNC: 106 MMOL/L (ref 95–110)
CO2 SERPL-SCNC: 26 MMOL/L (ref 23–29)
CO2 SERPL-SCNC: 26 MMOL/L (ref 23–29)
CREAT SERPL-MCNC: 1 MG/DL (ref 0.5–1.4)
CREAT SERPL-MCNC: 1.3 MG/DL (ref 0.5–1.4)
DIFFERENTIAL METHOD: ABNORMAL
EOSINOPHIL # BLD AUTO: 0 K/UL (ref 0–0.5)
EOSINOPHIL NFR BLD: 0.1 % (ref 0–8)
ERYTHROCYTE [DISTWIDTH] IN BLOOD BY AUTOMATED COUNT: 13 % (ref 11.5–14.5)
EST. GFR  (AFRICAN AMERICAN): 43.8 ML/MIN/1.73 M^2
EST. GFR  (AFRICAN AMERICAN): >60 ML/MIN/1.73 M^2
EST. GFR  (NON AFRICAN AMERICAN): 38 ML/MIN/1.73 M^2
EST. GFR  (NON AFRICAN AMERICAN): 52.2 ML/MIN/1.73 M^2
GLUCOSE SERPL-MCNC: 196 MG/DL (ref 70–110)
GLUCOSE SERPL-MCNC: 492 MG/DL (ref 70–110)
HCT VFR BLD AUTO: 44.4 % (ref 37–48.5)
HGB BLD-MCNC: 14 G/DL (ref 12–16)
IMM GRANULOCYTES # BLD AUTO: 0.18 K/UL (ref 0–0.04)
IMM GRANULOCYTES NFR BLD AUTO: 0.9 % (ref 0–0.5)
LYMPHOCYTES # BLD AUTO: 1.8 K/UL (ref 1–4.8)
LYMPHOCYTES NFR BLD: 8.9 % (ref 18–48)
MAGNESIUM SERPL-MCNC: 2.3 MG/DL (ref 1.6–2.6)
MCH RBC QN AUTO: 29.5 PG (ref 27–31)
MCHC RBC AUTO-ENTMCNC: 31.5 G/DL (ref 32–36)
MCV RBC AUTO: 94 FL (ref 82–98)
MONOCYTES # BLD AUTO: 1 K/UL (ref 0.3–1)
MONOCYTES NFR BLD: 4.8 % (ref 4–15)
NEUTROPHILS # BLD AUTO: 17.2 K/UL (ref 1.8–7.7)
NEUTROPHILS NFR BLD: 85 % (ref 38–73)
NRBC BLD-RTO: 0 /100 WBC
PHOSPHATE SERPL-MCNC: 3.7 MG/DL (ref 2.7–4.5)
PLATELET # BLD AUTO: 353 K/UL (ref 150–350)
PMV BLD AUTO: 12.2 FL (ref 9.2–12.9)
POCT GLUCOSE: 194 MG/DL (ref 70–110)
POCT GLUCOSE: 222 MG/DL (ref 70–110)
POCT GLUCOSE: 427 MG/DL (ref 70–110)
POCT GLUCOSE: 448 MG/DL (ref 70–110)
POCT GLUCOSE: 473 MG/DL (ref 70–110)
POCT GLUCOSE: 497 MG/DL (ref 70–110)
POCT GLUCOSE: >500 MG/DL (ref 70–110)
POCT GLUCOSE: >500 MG/DL (ref 70–110)
POTASSIUM SERPL-SCNC: 4.5 MMOL/L (ref 3.5–5.1)
POTASSIUM SERPL-SCNC: 4.9 MMOL/L (ref 3.5–5.1)
PROT SERPL-MCNC: 6.6 G/DL (ref 6–8.4)
RBC # BLD AUTO: 4.75 M/UL (ref 4–5.4)
SODIUM SERPL-SCNC: 138 MMOL/L (ref 136–145)
SODIUM SERPL-SCNC: 142 MMOL/L (ref 136–145)
WBC # BLD AUTO: 20.18 K/UL (ref 3.9–12.7)

## 2020-09-30 PROCEDURE — 94761 N-INVAS EAR/PLS OXIMETRY MLT: CPT

## 2020-09-30 PROCEDURE — 99233 SBSQ HOSP IP/OBS HIGH 50: CPT | Mod: GC,,, | Performed by: STUDENT IN AN ORGANIZED HEALTH CARE EDUCATION/TRAINING PROGRAM

## 2020-09-30 PROCEDURE — 20600001 HC STEP DOWN PRIVATE ROOM

## 2020-09-30 PROCEDURE — 99900035 HC TECH TIME PER 15 MIN (STAT)

## 2020-09-30 PROCEDURE — 80053 COMPREHEN METABOLIC PANEL: CPT

## 2020-09-30 PROCEDURE — 85025 COMPLETE CBC W/AUTO DIFF WBC: CPT

## 2020-09-30 PROCEDURE — 80048 BASIC METABOLIC PNL TOTAL CA: CPT

## 2020-09-30 PROCEDURE — 25000003 PHARM REV CODE 250: Performed by: STUDENT IN AN ORGANIZED HEALTH CARE EDUCATION/TRAINING PROGRAM

## 2020-09-30 PROCEDURE — 94640 AIRWAY INHALATION TREATMENT: CPT

## 2020-09-30 PROCEDURE — 82010 KETONE BODYS QUAN: CPT

## 2020-09-30 PROCEDURE — 27000221 HC OXYGEN, UP TO 24 HOURS

## 2020-09-30 PROCEDURE — 63700000 PHARM REV CODE 250 ALT 637 W/O HCPCS: Performed by: STUDENT IN AN ORGANIZED HEALTH CARE EDUCATION/TRAINING PROGRAM

## 2020-09-30 PROCEDURE — 83735 ASSAY OF MAGNESIUM: CPT

## 2020-09-30 PROCEDURE — 63600175 PHARM REV CODE 636 W HCPCS: Performed by: STUDENT IN AN ORGANIZED HEALTH CARE EDUCATION/TRAINING PROGRAM

## 2020-09-30 PROCEDURE — 36415 COLL VENOUS BLD VENIPUNCTURE: CPT

## 2020-09-30 PROCEDURE — 25000242 PHARM REV CODE 250 ALT 637 W/ HCPCS: Performed by: STUDENT IN AN ORGANIZED HEALTH CARE EDUCATION/TRAINING PROGRAM

## 2020-09-30 PROCEDURE — 94664 DEMO&/EVAL PT USE INHALER: CPT

## 2020-09-30 PROCEDURE — 99233 PR SUBSEQUENT HOSPITAL CARE,LEVL III: ICD-10-PCS | Mod: GC,,, | Performed by: STUDENT IN AN ORGANIZED HEALTH CARE EDUCATION/TRAINING PROGRAM

## 2020-09-30 PROCEDURE — 27000646 HC AEROBIKA DEVICE

## 2020-09-30 PROCEDURE — 84100 ASSAY OF PHOSPHORUS: CPT

## 2020-09-30 RX ORDER — LANOLIN ALCOHOL/MO/W.PET/CERES
400 CREAM (GRAM) TOPICAL DAILY
Status: ON HOLD | COMMUNITY

## 2020-09-30 RX ORDER — ATORVASTATIN CALCIUM 20 MG/1
20 TABLET, FILM COATED ORAL NIGHTLY
Status: DISCONTINUED | OUTPATIENT
Start: 2020-10-01 | End: 2020-10-02 | Stop reason: HOSPADM

## 2020-09-30 RX ORDER — MONTELUKAST SODIUM 10 MG/1
10 TABLET ORAL NIGHTLY
Status: DISCONTINUED | OUTPATIENT
Start: 2020-09-30 | End: 2020-10-02 | Stop reason: HOSPADM

## 2020-09-30 RX ORDER — LATANOPROST 50 UG/ML
1 SOLUTION/ DROPS OPHTHALMIC NIGHTLY
Status: DISCONTINUED | OUTPATIENT
Start: 2020-10-01 | End: 2020-10-02 | Stop reason: HOSPADM

## 2020-09-30 RX ORDER — FUROSEMIDE 40 MG/1
40 TABLET ORAL ONCE
Status: COMPLETED | OUTPATIENT
Start: 2020-09-30 | End: 2020-09-30

## 2020-09-30 RX ORDER — LEVALBUTEROL INHALATION SOLUTION 0.63 MG/3ML
0.63 SOLUTION RESPIRATORY (INHALATION) EVERY 12 HOURS
Status: DISCONTINUED | OUTPATIENT
Start: 2020-09-30 | End: 2020-10-02 | Stop reason: HOSPADM

## 2020-09-30 RX ORDER — DOXYCYCLINE HYCLATE 100 MG
100 TABLET ORAL EVERY 12 HOURS
Status: DISCONTINUED | OUTPATIENT
Start: 2020-09-30 | End: 2020-10-02

## 2020-09-30 RX ORDER — CHOLECALCIFEROL (VITAMIN D3) 25 MCG
1000 TABLET ORAL DAILY
Status: ON HOLD | COMMUNITY

## 2020-09-30 RX ORDER — SIMETHICONE 80 MG
1 TABLET,CHEWABLE ORAL 3 TIMES DAILY PRN
Status: DISCONTINUED | OUTPATIENT
Start: 2020-09-30 | End: 2020-10-02 | Stop reason: HOSPADM

## 2020-09-30 RX ADMIN — FLUTICASONE FUROATE AND VILANTEROL TRIFENATATE 1 PUFF: 200; 25 POWDER RESPIRATORY (INHALATION) at 09:09

## 2020-09-30 RX ADMIN — HYDRALAZINE HYDROCHLORIDE 25 MG: 25 TABLET, FILM COATED ORAL at 09:09

## 2020-09-30 RX ADMIN — INSULIN DETEMIR 5 UNITS: 100 INJECTION, SOLUTION SUBCUTANEOUS at 09:09

## 2020-09-30 RX ADMIN — LATANOPROST 1 DROP: 50 SOLUTION OPHTHALMIC at 09:09

## 2020-09-30 RX ADMIN — ENOXAPARIN SODIUM 40 MG: 40 INJECTION SUBCUTANEOUS at 04:09

## 2020-09-30 RX ADMIN — IRBESARTAN 300 MG: 150 TABLET, FILM COATED ORAL at 09:09

## 2020-09-30 RX ADMIN — SODIUM CHLORIDE 3 UNITS/HR: 9 INJECTION, SOLUTION INTRAVENOUS at 09:09

## 2020-09-30 RX ADMIN — PREDNISONE 40 MG: 20 TABLET ORAL at 09:09

## 2020-09-30 RX ADMIN — FUROSEMIDE 40 MG: 40 TABLET ORAL at 04:09

## 2020-09-30 RX ADMIN — SIMETHICONE CHEW TAB 80 MG 80 MG: 80 TABLET ORAL at 05:09

## 2020-09-30 RX ADMIN — AZITHROMYCIN 500 MG: 250 TABLET, FILM COATED ORAL at 09:09

## 2020-09-30 RX ADMIN — LEVALBUTEROL HYDROCHLORIDE 0.63 MG: 0.63 SOLUTION RESPIRATORY (INHALATION) at 08:09

## 2020-09-30 RX ADMIN — OLOPATADINE HYDROCHLORIDE 1 DROP: 2 SOLUTION OPHTHALMIC at 09:09

## 2020-09-30 RX ADMIN — ATORVASTATIN CALCIUM 10 MG: 10 TABLET, FILM COATED ORAL at 09:09

## 2020-09-30 RX ADMIN — MONTELUKAST 10 MG: 10 TABLET, FILM COATED ORAL at 09:09

## 2020-09-30 RX ADMIN — INSULIN ASPART 5 UNITS: 100 INJECTION, SOLUTION INTRAVENOUS; SUBCUTANEOUS at 04:09

## 2020-09-30 RX ADMIN — DOXYCYCLINE HYCLATE 100 MG: 100 TABLET, COATED ORAL at 09:09

## 2020-09-30 NOTE — PHARMACY MED REC
"Admission Medication Reconciliation - Pharmacy Consult Note    The home medication history was taken by Jaci Hannah Pharmacy Tech.     Based on information gathered and subsequent review by the clinical pharmacist, the items below may need attention.     You may go to "Admission" then "Reconcile Home Medications" tabs to review and/or act upon these items.     Potentially problematic discrepancies with current MAR  o Patient IS taking the following which was not ordered upon admit  o Montelukast 10 mg PO nightly  o Patient is taking a drug DIFFERENTLY than how ordered upon admit  o Takes atorvastatin 20 mg PO nightly      Patient'a A1c = 9.3% and on sulfonylurea monotherapy at home.  Consider optimizing home regimen or scheduling outpatient follow up for management.    Please address this information as you see fit.  Feel free to contact us if you have any questions or require assistance.    José Moreno, Pharm.D., BCPS  [id number]                    .    .            "

## 2020-09-30 NOTE — PLAN OF CARE
Overnight, no acute changes. Blood glucose monitoring performed. SSI administered. MD made aware of elevated CBG.  Incentive spirometer given and education provided. Pt did complain of some gas. PRN simethicone ordered and administered. Possible discharge. CABRERA

## 2020-09-30 NOTE — ASSESSMENT & PLAN NOTE
Takes Amaryl at home, A1c 9.3%.   - LDSSI  - Levemir 5 units daily, increased to 7 U due to persistent hyperglycemia, up to 400s today, prednisone contributing  - Diabetic/Cardiac diet  - given elevated A1c on Amaryl, will need to follow up with PCP for DM management

## 2020-09-30 NOTE — ASSESSMENT & PLAN NOTE
Sussy Costa is an 84 year old female with HTN, DM2, asthma, and hyperlipidemia who presents with shortness of breath and wheezing. CXR concerning for RML pneumonia from Urgent Care. Patient afebrile and hemodynamically stable on arrival. Requiring 2 L NC and given Duonebs. WBC 13.1 and lactate wnl. COVID negative. BNP 79.    Increasing WBC in the setting of prednisone. No new fevers, improving hypoxia, transitioned to oral abx.     Plan   - will treat for RML pneumonia and asthma exacerbation   - IV Azithromycin for 1 day, transitioned to oral for a total 3 day course   - IV Ceftriazone discontinued  - given infiltrated line and improving hypoxia, transitioned to oral doxycycline for a total 5 day course   - f/u sputum and blood cultures, NGTD  - Duo-nebs Q6H prn discontinued and started Xopenex given elevated HR  - continue home Fluticasone-vilanterol   - Prednisone 40 mg daily for 4 days, received steroid injection at urgent care  - Wean oxygen, keep sats >92%  - replacing electrolytes as needed  - AM CBC/CMP

## 2020-09-30 NOTE — PROGRESS NOTES
[facility] Hospital Medicine  Progress Note    Patient Name: Sussy Costa  MRN: [id number]  Patient Class: IP- Inpatient   Admission Date: 9/28/2020  Length of Stay: 2 days  Attending Physician: Denny Livingston MD  Primary Care Provider: EZ Casillas MD    [facility] Medicine Team: [facility]P MED 2 Janet Thomas MD    Subjective:     Principal Problem:Acute hypoxemic respiratory failure        HPI:  Sussy Costa is an 84 year old female with HTN, DM2, asthma, and hyperlipidemia who presents with a 7 day history of shortness of breath, cough, wheezing, and some sputum production. The shortness of breath is with walking and mild exertion. Denies any shortness of breath at rest. Does not use home oxygen. Has been coughing up some grey sputum the past few days. Over the past 3 days, patient has had to use her inhaler more frequently. Patient was initially seen at Urgent Care and then was told to go to ED. At the Urgent Care, she was given Duo-nebs and a steroid injection. CXR was completed there and concerning for RML pneumonia. Patient is COVID negative. Her  was recently admitted for pneumonia and has been visiting him in the hospital. Denies any recent abx use. Denies any recent travel. Patient denies fevers, chills, chest pain, abdominal pain, and N/V/D.      Overview/Hospital Course:  Admitted to hospital medicine on 9/28/20 with acute hypoxic respiratory failure 2/2 RML pneumonia and asthma exacerbation. Treated with ceftriaxone, azithromycin, prednisone, duo-nebs, and home Breo Ellipta.    Interval History: Shortness of breath improving, however, continued wheezing. Starting xopenex. On 2 L NC, down to 1 L this afternoon. IV lasix 40 mg today given trace LE edema and improvement after yesterday's lasix. Continues to work with PT/OT.    Review of Systems   Constitutional: Negative for chills, fatigue and fever.   HENT: Negative for congestion, sinus pressure and sinus pain.     Eyes: Negative for discharge, itching and visual disturbance.   Respiratory: Positive for cough (improving), shortness of breath (improving) and wheezing (improving). Negative for choking and chest tightness.    Cardiovascular: Negative for chest pain, palpitations and leg swelling.   Gastrointestinal: Negative for abdominal pain, constipation, diarrhea, nausea and vomiting.   Endocrine: Negative for polydipsia and polyuria.   Genitourinary: Negative for dysuria and flank pain.   Musculoskeletal: Negative for arthralgias and back pain.   Skin: Negative.    Neurological: Negative for dizziness, light-headedness and headaches.   Psychiatric/Behavioral: Negative for agitation and confusion.     Objective:     Vital Signs (Most Recent):  Temp: 98.6 °F (37 °C) (09/30/20 1600)  Pulse: 87 (09/30/20 0902)  Resp: 18 (09/30/20 0902)  BP: (!) 165/85 (09/30/20 0803)  SpO2: (!) 93 % (09/30/20 0902) Vital Signs (24h Range):  Temp:  [97.1 °F (36.2 °C)-98.6 °F (37 °C)] 98.6 °F (37 °C)  Pulse:  [] 87  Resp:  [17-27] 18  SpO2:  [93 %-97 %] 93 %  BP: (149-184)/() 165/85     Weight: 57.2 kg (126 lb 1.7 oz)  Body mass index is 20.98 kg/m².    Intake/Output Summary (Last 24 hours) at 9/30/2020 1642  Last data filed at 9/30/2020 0400  Gross per 24 hour   Intake 240 ml   Output 300 ml   Net -60 ml      Physical Exam  Vitals signs and nursing note reviewed.   Constitutional:       Appearance: Normal appearance.   HENT:      Head: Normocephalic and atraumatic.   Eyes:      General: No scleral icterus.     Extraocular Movements: Extraocular movements intact.   Neck:      Musculoskeletal: Normal range of motion and neck supple.   Cardiovascular:      Rate and Rhythm: Regular rhythm. Tachycardia present.   Pulmonary:      Effort: Pulmonary effort is normal.      Comments: Wheezes and course breath sounds bilaterally  On 2 L NC  Abdominal:      General: There is no distension.      Palpations: Abdomen is soft.   Musculoskeletal:  Normal range of motion.      Comments: trace non-pitting edema to mid shins bilaterally   Skin:     General: Skin is warm and dry.      Capillary Refill: Capillary refill takes less than 2 seconds.   Neurological:      Mental Status: She is alert.         Significant Labs: All pertinent labs within the past 24 hours have been reviewed.    Significant Imaging: I have reviewed all pertinent imaging results/findings within the past 24 hours.      Assessment/Plan:      * Acute hypoxemic respiratory failure  Sussy Costa is an 84 year old female with HTN, DM2, asthma, and hyperlipidemia who presents with shortness of breath and wheezing. CXR concerning for RML pneumonia from Urgent Care. Patient afebrile and hemodynamically stable on arrival. Requiring 2 L NC and given Duonebs. WBC 13.1 and lactate wnl. COVID negative. BNP 79.    Increasing WBC in the setting of prednisone. No new fevers, improving hypoxia, transitioned to oral abx.     Plan   - will treat for RML pneumonia and asthma exacerbation   - IV Azithromycin for 1 day, transitioned to oral for a total 3 day course   - IV Ceftriazone discontinued  - given infiltrated line and improving hypoxia, transitioned to oral doxycycline for a total 5 day course   - f/u sputum and blood cultures, NGTD  - Duo-nebs Q6H prn discontinued and started Xopenex given elevated HR  - continue home Fluticasone-vilanterol   - Prednisone 40 mg daily for 4 days, received steroid injection at urgent care  - Wean oxygen, keep sats >92%  - replacing electrolytes as needed  - AM CBC/CMP    New onset a-fib  - New onset Afib on 9/28/20 seen on EKG, tachycardia with PACs  - Patient denies prior history of Afib  - CHADs-VASc of 5  - Briefly discussed anticoagulation with patient and she wants to hold off for now. Can re-address as an outpatient.  - DVT prophylaxis for now   - keep K>4 and Mg>2  - TSH wnl   - HR in the 70s-100s, ctm, may consider starting BB however, contraindicated in asthma  exacerbation    Pneumonia of right middle lobe due to infectious organism  - see acute hypoxic respiratory failure      Dyslipidemia associated with type 2 diabetes mellitus  - continue statin    Asthma with exacerbation  - see acute hypoxic respiratory failure      Hypertension  - continue home Irbesartan 300 mg daily   - PCP f/u to reassess regimen    Hyperlipidemia  F/u with PCP      Diabetes mellitus, type II  Takes Amaryl at home, A1c 9.3%.   - LDSSI  - Levemir 5 units daily, increased to 7 U due to persistent hyperglycemia, up to 400s today, prednisone contributing  - Diabetic/Cardiac diet  - given elevated A1c on Amaryl, will need to follow up with PCP for DM management     VTE Risk Mitigation (From admission, onward)         Ordered     enoxaparin injection 40 mg  Every 24 hours      09/28/20 1835     IP VTE HIGH RISK PATIENT  Once      09/28/20 1835     Place sequential compression device  Until discontinued      09/28/20 1835                Discharge Planning   ANTOINE: 9/30/2020     Code Status: Full Code   Is the patient medically ready for discharge?:     Reason for patient still in hospital (select all that apply): Patient trending condition and Treatment  Discharge Plan A: Home with family   Discharge Delays: None known at this time      Janet Thomas MD  Department of Hospital Medicine   [facility]

## 2020-09-30 NOTE — PHARMACY MED REC
"Admission Medication Reconciliation - Pharmacy Consult Note    The home medication history was taken by Jaci Hannah, Pharmacy Tech.     Based on information gathered and subsequent review by the clinical pharmacist, the items below may need attention.     You may go to "Admission" then "Reconcile Home Medications" tabs to review and/or act upon these items.     Potentially problematic discrepancies with current MAR  o Patient IS taking the following which was not ordered upon admit  o Montelukast 10 mg PO nightly  o Patient is taking a drug DIFFERENTLY than how ordered upon admit  o Takes atorvastatin 20 mg PO nightly    Please address this information as you see fit.  Feel free to contact us if you have any questions or require assistance.    José Moreno, Pharm.D., BCPS  [id number]                    .    .            "

## 2020-09-30 NOTE — ASSESSMENT & PLAN NOTE
- New onset Afib on 9/28/20 seen on EKG, tachycardia with PACs  - Patient denies prior history of Afib  - CHADs-VASc of 5  - Briefly discussed anticoagulation with patient and she wants to hold off for now. Can re-address as an outpatient.  - DVT prophylaxis for now   - keep K>4 and Mg>2  - TSH wnl   - HR in the 70s-100s, ctm, may consider starting BB however, contraindicated in asthma exacerbation

## 2020-10-01 LAB
ALBUMIN SERPL BCP-MCNC: 2.9 G/DL (ref 3.5–5.2)
ALP SERPL-CCNC: 63 U/L (ref 55–135)
ALT SERPL W/O P-5'-P-CCNC: 16 U/L (ref 10–44)
ANION GAP SERPL CALC-SCNC: 10 MMOL/L (ref 8–16)
AST SERPL-CCNC: 10 U/L (ref 10–40)
BASOPHILS # BLD AUTO: 0.03 K/UL (ref 0–0.2)
BASOPHILS NFR BLD: 0.2 % (ref 0–1.9)
BILIRUB SERPL-MCNC: 0.4 MG/DL (ref 0.1–1)
BUN SERPL-MCNC: 33 MG/DL (ref 8–23)
CALCIUM SERPL-MCNC: 9.5 MG/DL (ref 8.7–10.5)
CHLORIDE SERPL-SCNC: 105 MMOL/L (ref 95–110)
CO2 SERPL-SCNC: 26 MMOL/L (ref 23–29)
CREAT SERPL-MCNC: 1.1 MG/DL (ref 0.5–1.4)
DIFFERENTIAL METHOD: ABNORMAL
EOSINOPHIL # BLD AUTO: 0 K/UL (ref 0–0.5)
EOSINOPHIL NFR BLD: 0.1 % (ref 0–8)
ERYTHROCYTE [DISTWIDTH] IN BLOOD BY AUTOMATED COUNT: 13 % (ref 11.5–14.5)
EST. GFR  (AFRICAN AMERICAN): 53.7 ML/MIN/1.73 M^2
EST. GFR  (NON AFRICAN AMERICAN): 46.5 ML/MIN/1.73 M^2
GLUCOSE SERPL-MCNC: 200 MG/DL (ref 70–110)
HCT VFR BLD AUTO: 41.7 % (ref 37–48.5)
HGB BLD-MCNC: 13.1 G/DL (ref 12–16)
IMM GRANULOCYTES # BLD AUTO: 0.16 K/UL (ref 0–0.04)
IMM GRANULOCYTES NFR BLD AUTO: 0.8 % (ref 0–0.5)
LYMPHOCYTES # BLD AUTO: 1.9 K/UL (ref 1–4.8)
LYMPHOCYTES NFR BLD: 10 % (ref 18–48)
MAGNESIUM SERPL-MCNC: 2.2 MG/DL (ref 1.6–2.6)
MCH RBC QN AUTO: 29.7 PG (ref 27–31)
MCHC RBC AUTO-ENTMCNC: 31.4 G/DL (ref 32–36)
MCV RBC AUTO: 95 FL (ref 82–98)
MONOCYTES # BLD AUTO: 0.9 K/UL (ref 0.3–1)
MONOCYTES NFR BLD: 4.9 % (ref 4–15)
NEUTROPHILS # BLD AUTO: 16.1 K/UL (ref 1.8–7.7)
NEUTROPHILS NFR BLD: 84 % (ref 38–73)
NRBC BLD-RTO: 0 /100 WBC
PLATELET # BLD AUTO: 325 K/UL (ref 150–350)
PMV BLD AUTO: 11.8 FL (ref 9.2–12.9)
POCT GLUCOSE: 125 MG/DL (ref 70–110)
POCT GLUCOSE: 133 MG/DL (ref 70–110)
POCT GLUCOSE: 140 MG/DL (ref 70–110)
POCT GLUCOSE: 147 MG/DL (ref 70–110)
POCT GLUCOSE: 174 MG/DL (ref 70–110)
POCT GLUCOSE: 202 MG/DL (ref 70–110)
POCT GLUCOSE: 208 MG/DL (ref 70–110)
POCT GLUCOSE: 259 MG/DL (ref 70–110)
POCT GLUCOSE: 274 MG/DL (ref 70–110)
POCT GLUCOSE: 319 MG/DL (ref 70–110)
POCT GLUCOSE: 342 MG/DL (ref 70–110)
POCT GLUCOSE: 412 MG/DL (ref 70–110)
POTASSIUM SERPL-SCNC: 4.6 MMOL/L (ref 3.5–5.1)
PROT SERPL-MCNC: 6.2 G/DL (ref 6–8.4)
RBC # BLD AUTO: 4.41 M/UL (ref 4–5.4)
SODIUM SERPL-SCNC: 141 MMOL/L (ref 136–145)
WBC # BLD AUTO: 19.19 K/UL (ref 3.9–12.7)

## 2020-10-01 PROCEDURE — 94799 UNLISTED PULMONARY SVC/PX: CPT

## 2020-10-01 PROCEDURE — 27000646 HC AEROBIKA DEVICE

## 2020-10-01 PROCEDURE — 25000003 PHARM REV CODE 250: Performed by: STUDENT IN AN ORGANIZED HEALTH CARE EDUCATION/TRAINING PROGRAM

## 2020-10-01 PROCEDURE — 63600175 PHARM REV CODE 636 W HCPCS: Performed by: STUDENT IN AN ORGANIZED HEALTH CARE EDUCATION/TRAINING PROGRAM

## 2020-10-01 PROCEDURE — 94761 N-INVAS EAR/PLS OXIMETRY MLT: CPT

## 2020-10-01 PROCEDURE — 99232 PR SUBSEQUENT HOSPITAL CARE,LEVL II: ICD-10-PCS | Mod: GC,,, | Performed by: HOSPITALIST

## 2020-10-01 PROCEDURE — 94664 DEMO&/EVAL PT USE INHALER: CPT

## 2020-10-01 PROCEDURE — C9399 UNCLASSIFIED DRUGS OR BIOLOG: HCPCS | Performed by: STUDENT IN AN ORGANIZED HEALTH CARE EDUCATION/TRAINING PROGRAM

## 2020-10-01 PROCEDURE — 94640 AIRWAY INHALATION TREATMENT: CPT

## 2020-10-01 PROCEDURE — 99900035 HC TECH TIME PER 15 MIN (STAT)

## 2020-10-01 PROCEDURE — 83735 ASSAY OF MAGNESIUM: CPT

## 2020-10-01 PROCEDURE — 36415 COLL VENOUS BLD VENIPUNCTURE: CPT

## 2020-10-01 PROCEDURE — 27000221 HC OXYGEN, UP TO 24 HOURS

## 2020-10-01 PROCEDURE — 20600001 HC STEP DOWN PRIVATE ROOM

## 2020-10-01 PROCEDURE — 85025 COMPLETE CBC W/AUTO DIFF WBC: CPT

## 2020-10-01 PROCEDURE — 25000242 PHARM REV CODE 250 ALT 637 W/ HCPCS: Performed by: STUDENT IN AN ORGANIZED HEALTH CARE EDUCATION/TRAINING PROGRAM

## 2020-10-01 PROCEDURE — 99232 SBSQ HOSP IP/OBS MODERATE 35: CPT | Mod: GC,,, | Performed by: HOSPITALIST

## 2020-10-01 PROCEDURE — 80053 COMPREHEN METABOLIC PANEL: CPT

## 2020-10-01 RX ORDER — FUROSEMIDE 40 MG/1
40 TABLET ORAL ONCE
Status: COMPLETED | OUTPATIENT
Start: 2020-10-01 | End: 2020-10-01

## 2020-10-01 RX ORDER — INSULIN ASPART 100 [IU]/ML
3 INJECTION, SOLUTION INTRAVENOUS; SUBCUTANEOUS
Status: DISCONTINUED | OUTPATIENT
Start: 2020-10-01 | End: 2020-10-02 | Stop reason: HOSPADM

## 2020-10-01 RX ORDER — IBUPROFEN 200 MG
16 TABLET ORAL
Status: DISCONTINUED | OUTPATIENT
Start: 2020-10-01 | End: 2020-10-02 | Stop reason: HOSPADM

## 2020-10-01 RX ORDER — MAGNESIUM SULFATE HEPTAHYDRATE 40 MG/ML
2 INJECTION, SOLUTION INTRAVENOUS ONCE
Status: COMPLETED | OUTPATIENT
Start: 2020-10-01 | End: 2020-10-01

## 2020-10-01 RX ORDER — GLUCAGON 1 MG
1 KIT INJECTION
Status: DISCONTINUED | OUTPATIENT
Start: 2020-10-01 | End: 2020-10-02 | Stop reason: HOSPADM

## 2020-10-01 RX ORDER — INSULIN ASPART 100 [IU]/ML
0-5 INJECTION, SOLUTION INTRAVENOUS; SUBCUTANEOUS
Status: DISCONTINUED | OUTPATIENT
Start: 2020-10-01 | End: 2020-10-02 | Stop reason: HOSPADM

## 2020-10-01 RX ORDER — IBUPROFEN 200 MG
24 TABLET ORAL
Status: DISCONTINUED | OUTPATIENT
Start: 2020-10-01 | End: 2020-10-02 | Stop reason: HOSPADM

## 2020-10-01 RX ADMIN — INSULIN ASPART 5 UNITS: 100 INJECTION, SOLUTION INTRAVENOUS; SUBCUTANEOUS at 05:10

## 2020-10-01 RX ADMIN — INSULIN ASPART 3 UNITS: 100 INJECTION, SOLUTION INTRAVENOUS; SUBCUTANEOUS at 08:10

## 2020-10-01 RX ADMIN — FUROSEMIDE 40 MG: 40 TABLET ORAL at 10:10

## 2020-10-01 RX ADMIN — LATANOPROST 1 DROP: 50 SOLUTION OPHTHALMIC at 09:10

## 2020-10-01 RX ADMIN — OLOPATADINE HYDROCHLORIDE 1 DROP: 2 SOLUTION OPHTHALMIC at 08:10

## 2020-10-01 RX ADMIN — ENOXAPARIN SODIUM 40 MG: 40 INJECTION SUBCUTANEOUS at 04:10

## 2020-10-01 RX ADMIN — DOXYCYCLINE HYCLATE 100 MG: 100 TABLET, COATED ORAL at 09:10

## 2020-10-01 RX ADMIN — INSULIN ASPART 3 UNITS: 100 INJECTION, SOLUTION INTRAVENOUS; SUBCUTANEOUS at 05:10

## 2020-10-01 RX ADMIN — INSULIN DETEMIR 7 UNITS: 100 INJECTION, SOLUTION SUBCUTANEOUS at 08:10

## 2020-10-01 RX ADMIN — LEVALBUTEROL HYDROCHLORIDE 0.63 MG: 0.63 SOLUTION RESPIRATORY (INHALATION) at 08:10

## 2020-10-01 RX ADMIN — PREDNISONE 40 MG: 20 TABLET ORAL at 08:10

## 2020-10-01 RX ADMIN — DOXYCYCLINE HYCLATE 100 MG: 100 TABLET, COATED ORAL at 08:10

## 2020-10-01 RX ADMIN — INSULIN ASPART 2 UNITS: 100 INJECTION, SOLUTION INTRAVENOUS; SUBCUTANEOUS at 09:10

## 2020-10-01 RX ADMIN — MAGNESIUM SULFATE 2 G: 2 INJECTION INTRAVENOUS at 10:10

## 2020-10-01 RX ADMIN — IRBESARTAN 300 MG: 150 TABLET, FILM COATED ORAL at 09:10

## 2020-10-01 RX ADMIN — ATORVASTATIN CALCIUM 20 MG: 20 TABLET, FILM COATED ORAL at 09:10

## 2020-10-01 RX ADMIN — FLUTICASONE FUROATE AND VILANTEROL TRIFENATATE 1 PUFF: 200; 25 POWDER RESPIRATORY (INHALATION) at 08:10

## 2020-10-01 RX ADMIN — INSULIN ASPART 3 UNITS: 100 INJECTION, SOLUTION INTRAVENOUS; SUBCUTANEOUS at 12:10

## 2020-10-01 RX ADMIN — MONTELUKAST 10 MG: 10 TABLET, FILM COATED ORAL at 09:10

## 2020-10-01 NOTE — CARE UPDATE
Received page at 8:00 p.m., regarding patient's elevated blood glucose.  Patient had elevated blood glucose reading greater than 500.  She has had multiple elevated readings above 400 throughout the day.  Patient has a history of diabetes, is currently on oral steroids.  Patient is currently not well controlled with current insulin regimen.  Will initiate insulin drip, starting at 3 units/hour given her weight of 57 kg, and blood glucose greater than 400.  Will continue with glucose checks q.1 hour, continue to monitor.  Beta hydroxybutyrate within normal limits, repeat BMP within normal limits, except for glucose of 421.  No evidence of anion gap acidosis.    Brian Holloway, DO  PGY-1 Internal Medicine  [facility]

## 2020-10-01 NOTE — PLAN OF CARE
Pt AAO*4, calm, cooperative. Continuous insulin drip initiated, titrated per alogorithm. UOP per flowsheet. No acute changes overnight. Will continue to monitor.

## 2020-10-01 NOTE — PLAN OF CARE
Problem: Fall Injury Risk  Goal: Absence of Fall and Fall-Related Injury  Intervention: Promote Injury-Free Environment  Flowsheets (Taken 9/30/2020 1923)  Safety Promotion/Fall Prevention: assistive device/personal item within reach  Environmental Safety Modification:   assistive device/personal items within reach   clutter free environment maintained   lighting adjusted     Problem: Adult Inpatient Plan of Care  Goal: Patient-Specific Goal (Individualization)  Flowsheets (Taken 9/30/2020 1923)  Individualized Care Needs: keep room cool  Anxieties, Fears or Concerns: none  Patient-Specific Goals (Include Timeframe): No oxygen!     Problem: Diabetes Comorbidity  Goal: Blood Glucose Level Within Desired Range  Intervention: Maintain Glycemic Control  Flowsheets (Taken 9/30/2020 1923)  Glycemic Management: blood glucose monitoring     Pt lying in bed, awake and alert. Currently on 0.5 L. Pt goal to be off O2 completely before tomorrow morning. Pt able to walk to the door from chair and back with no c/o of chest tightness/SOB. Plan of care reviewed with pt and pt's .  Pt hoping to be DC tomorrow.

## 2020-10-01 NOTE — SUBJECTIVE & OBJECTIVE
Interval History: Patient placed on insulin gtt overnight for hyperglycemia. Transitioned back to subcutaneous insulin this morning. Patient at 90% on room air today and will get short of breath with ambulation. Continues to have moderate wheezing.     Review of Systems   Constitutional: Negative for chills, fatigue and fever.   HENT: Negative for congestion, sinus pressure and sinus pain.    Eyes: Negative for discharge, itching and visual disturbance.   Respiratory: Positive for cough, shortness of breath and wheezing. Negative for choking and chest tightness.    Cardiovascular: Positive for leg swelling. Negative for chest pain and palpitations.   Gastrointestinal: Negative for abdominal pain, constipation, diarrhea, nausea and vomiting.   Endocrine: Negative for polydipsia and polyuria.   Genitourinary: Negative for dysuria and flank pain.   Musculoskeletal: Negative for arthralgias and back pain.   Skin: Negative.    Neurological: Negative for dizziness, light-headedness and headaches.   Psychiatric/Behavioral: Negative for agitation and confusion.     Objective:     Vital Signs (Most Recent):  Temp: 97.6 °F (36.4 °C) (10/01/20 0351)  Pulse: 107 (10/01/20 1226)  Resp: (!) 26 (10/01/20 1226)  BP: (!) 146/77 (10/01/20 1226)  SpO2: (!) 92 % (10/01/20 1226) Vital Signs (24h Range):  Temp:  [97.6 °F (36.4 °C)-98.6 °F (37 °C)] 97.6 °F (36.4 °C)  Pulse:  [] 107  Resp:  [16-26] 26  SpO2:  [92 %-96 %] 92 %  BP: (143-190)/(75-97) 146/77     Weight: 57.2 kg (126 lb 1.7 oz)  Body mass index is 20.98 kg/m².    Intake/Output Summary (Last 24 hours) at 10/1/2020 1402  Last data filed at 9/30/2020 2300  Gross per 24 hour   Intake --   Output 600 ml   Net -600 ml      Physical Exam  Vitals signs and nursing note reviewed.   Constitutional:       Appearance: Normal appearance.   HENT:      Head: Normocephalic and atraumatic.      Mouth/Throat:      Mouth: Mucous membranes are dry.   Eyes:      General: No scleral  icterus.     Extraocular Movements: Extraocular movements intact.   Neck:      Musculoskeletal: Normal range of motion and neck supple.   Cardiovascular:      Rate and Rhythm: Regular rhythm. Tachycardia present.   Pulmonary:      Effort: Pulmonary effort is normal.      Comments: Wheezes and course breath sounds bilaterally  On Room Air  Abdominal:      General: There is no distension.      Palpations: Abdomen is soft.   Musculoskeletal: Normal range of motion.      Comments: 1+ pitting edema to mid shins bilaterally   Skin:     General: Skin is warm and dry.      Capillary Refill: Capillary refill takes less than 2 seconds.   Neurological:      Mental Status: She is alert.         Significant Labs: All pertinent labs within the past 24 hours have been reviewed.    Significant Imaging: I have reviewed all pertinent imaging results/findings within the past 24 hours.

## 2020-10-01 NOTE — NURSING
Evening glucose reading 412. IM 2 paged and notify. MD to add sliding scale to MAR. Will monitor.

## 2020-10-01 NOTE — PROGRESS NOTES
[facility] Hospital Medicine  Progress Note    Patient Name: Sussy Costa  MRN: [id number]  Patient Class: IP- Inpatient   Admission Date: 9/28/2020  Length of Stay: 3 days  Attending Physician: Krystin Sebastian MD  Primary Care Provider: EZ Casillas MD    [facility] Medicine Team: [facility]OSP MED 2 Brian Lentz MD    Subjective:     Principal Problem:Acute hypoxemic respiratory failure        HPI:  Sussy Costa is an 84 year old female with HTN, DM2, asthma, and hyperlipidemia who presents with a 7 day history of shortness of breath, cough, wheezing, and some sputum production. The shortness of breath is with walking and mild exertion. Denies any shortness of breath at rest. Does not use home oxygen. Has been coughing up some grey sputum the past few days. Over the past 3 days, patient has had to use her inhaler more frequently. Patient was initially seen at Urgent Care and then was told to go to ED. At the Urgent Care, she was given Duo-nebs and a steroid injection. CXR was completed there and concerning for RML pneumonia. Patient is COVID negative. Her  was recently admitted for pneumonia and has been visiting him in the hospital. Denies any recent abx use. Denies any recent travel. Patient denies fevers, chills, chest pain, abdominal pain, and N/V/D.      Overview/Hospital Course:  Admitted to hospital medicine on 9/28/20 with acute hypoxic respiratory failure 2/2 RML pneumonia and asthma exacerbation. Treated with ceftriaxone, azithromycin, prednisone, duo-nebs, and home Breo Ellipta. Abx switched to PO doxycycline.     Interval History: Patient placed on insulin gtt overnight for hyperglycemia. Transitioned back to subcutaneous insulin this morning. Patient at 90% on room air today and will get short of breath with ambulation. Continues to have moderate wheezing.     Review of Systems   Constitutional: Negative for chills, fatigue and fever.   HENT: Negative for congestion, sinus  pressure and sinus pain.    Eyes: Negative for discharge, itching and visual disturbance.   Respiratory: Positive for cough, shortness of breath and wheezing. Negative for choking and chest tightness.    Cardiovascular: Positive for leg swelling. Negative for chest pain and palpitations.   Gastrointestinal: Negative for abdominal pain, constipation, diarrhea, nausea and vomiting.   Endocrine: Negative for polydipsia and polyuria.   Genitourinary: Negative for dysuria and flank pain.   Musculoskeletal: Negative for arthralgias and back pain.   Skin: Negative.    Neurological: Negative for dizziness, light-headedness and headaches.   Psychiatric/Behavioral: Negative for agitation and confusion.     Objective:     Vital Signs (Most Recent):  Temp: 97.6 °F (36.4 °C) (10/01/20 0351)  Pulse: 107 (10/01/20 1226)  Resp: (!) 26 (10/01/20 1226)  BP: (!) 146/77 (10/01/20 1226)  SpO2: (!) 92 % (10/01/20 1226) Vital Signs (24h Range):  Temp:  [97.6 °F (36.4 °C)-98.6 °F (37 °C)] 97.6 °F (36.4 °C)  Pulse:  [] 107  Resp:  [16-26] 26  SpO2:  [92 %-96 %] 92 %  BP: (143-190)/(75-97) 146/77     Weight: 57.2 kg (126 lb 1.7 oz)  Body mass index is 20.98 kg/m².    Intake/Output Summary (Last 24 hours) at 10/1/2020 1402  Last data filed at 9/30/2020 2300  Gross per 24 hour   Intake --   Output 600 ml   Net -600 ml      Physical Exam  Vitals signs and nursing note reviewed.   Constitutional:       Appearance: Normal appearance.   HENT:      Head: Normocephalic and atraumatic.      Mouth/Throat:      Mouth: Mucous membranes are dry.   Eyes:      General: No scleral icterus.     Extraocular Movements: Extraocular movements intact.   Neck:      Musculoskeletal: Normal range of motion and neck supple.   Cardiovascular:      Rate and Rhythm: Regular rhythm. Tachycardia present.   Pulmonary:      Effort: Pulmonary effort is normal.      Comments: Wheezes and course breath sounds bilaterally  On Room Air  Abdominal:      General: There is no  distension.      Palpations: Abdomen is soft.   Musculoskeletal: Normal range of motion.      Comments: 1+ pitting edema to mid shins bilaterally   Skin:     General: Skin is warm and dry.      Capillary Refill: Capillary refill takes less than 2 seconds.   Neurological:      Mental Status: She is alert.         Significant Labs: All pertinent labs within the past 24 hours have been reviewed.    Significant Imaging: I have reviewed all pertinent imaging results/findings within the past 24 hours.      Assessment/Plan:      * Acute hypoxemic respiratory failure  Sussy Costa is an 84 year old female with HTN, DM2, asthma, and hyperlipidemia who presents with shortness of breath and wheezing. CXR concerning for RML pneumonia from Urgent Care. Patient afebrile and hemodynamically stable on arrival. Requiring 2 L NC and given Duonebs. WBC 13.1 and lactate wnl. COVID negative. BNP 79.    Increasing WBC in the setting of prednisone. No new fevers, improving hypoxia, transitioned to oral abx.     Plan   - will treat for RML pneumonia and asthma exacerbation   - IV Azithromycin for 1 day, transitioned to oral for a total 3 day course   - IV Ceftriazone discontinued  - given infiltrated line and improving hypoxia, transitioned to oral doxycycline for a total 5 day course   - f/u sputum and blood cultures, NGTD  - Duo-nebs Q6H prn discontinued and started Xopenex given elevated HR  - continue home Fluticasone-vilanterol   - Prednisone 40 mg daily for 4 days, received steroid injection at urgent care  - Wean oxygen, keep sats >92%  - replacing electrolytes as needed      Pneumonia of right middle lobe due to infectious organism  - see acute hypoxic respiratory failure      Asthma with exacerbation  - see acute hypoxic respiratory failure      New onset a-fib  - New onset Afib on 9/28/20 seen on EKG, tachycardia with PACs  - Patient denies prior history of Afib  - CHADs-VASc of 5  - Briefly discussed anticoagulation with  patient and she wants to hold off for now. Can re-address as an outpatient.  - DVT prophylaxis for now   - keep K>4 and Mg>2  - TSH wnl   - HR in the 70s-100s, ctm, may consider starting BB however, contraindicated in asthma exacerbation    Hypertension  - continue home Irbesartan 300 mg daily   - PCP f/u to reassess regimen    Dyslipidemia associated with type 2 diabetes mellitus  - continue statin    Hyperlipidemia  F/u with PCP      Diabetes mellitus, type II  Takes Amaryl at home, A1c 9.3%.   - LDSSI  - Levemir 7 units BID and Novolog 3 units TIDWM  - Diabetic/Cardiac diet  - given elevated A1c on Amaryl, will need to follow up with PCP for DM management       VTE Risk Mitigation (From admission, onward)         Ordered     enoxaparin injection 40 mg  Every 24 hours      09/28/20 1835     IP VTE HIGH RISK PATIENT  Once      09/28/20 1835     Place sequential compression device  Until discontinued      09/28/20 1835                Discharge Planning   ANTOINE: 10/3/2020     Code Status: Full Code   Is the patient medically ready for discharge?:     Reason for patient still in hospital (select all that apply): Patient unstable and Treatment  Discharge Plan A: Home with family   Discharge Delays: None known at this time              Brian Lentz MD  Department of Hospital Medicine   [facility]

## 2020-10-01 NOTE — PLAN OF CARE
Problem: Fall Injury Risk  Goal: Absence of Fall and Fall-Related Injury  Outcome: Ongoing, Progressing  Intervention: Identify and Manage Contributors to Fall Injury Risk  Flowsheets (Taken 10/1/2020 1617)  Self-Care Promotion: independence encouraged  Medication Review/Management: medications reviewed     Problem: Adult Inpatient Plan of Care  Goal: Patient-Specific Goal (Individualization)  Outcome: Ongoing, Progressing  Goal: Optimal Comfort and Wellbeing  Outcome: Ongoing, Progressing  Intervention: Provide Person-Centered Care  Flowsheets (Taken 10/1/2020 1617)  Trust Relationship/Rapport:   care explained   choices provided   questions answered   emotional support provided   empathic listening provided     Patient taken off of insulin dripped and switched over to ac and hs accuchecks.  Patients IV was accidentally pulled out today and a new one was inserted.  Possible discharge tomorrow on 10/2.  Will continue to monitor.

## 2020-10-01 NOTE — ASSESSMENT & PLAN NOTE
Takes Amaryl at home, A1c 9.3%.   - LDSSI  - Levemir 7 units BID and Novolog 3 units TIDWM  - Diabetic/Cardiac diet  - given elevated A1c on Amaryl, will need to follow up with PCP for DM management

## 2020-10-01 NOTE — ASSESSMENT & PLAN NOTE
Sussy Costa is an 84 year old female with HTN, DM2, asthma, and hyperlipidemia who presents with shortness of breath and wheezing. CXR concerning for RML pneumonia from Urgent Care. Patient afebrile and hemodynamically stable on arrival. Requiring 2 L NC and given Duonebs. WBC 13.1 and lactate wnl. COVID negative. BNP 79.    Increasing WBC in the setting of prednisone. No new fevers, improving hypoxia, transitioned to oral abx.     Plan   - will treat for RML pneumonia and asthma exacerbation   - IV Azithromycin for 1 day, transitioned to oral for a total 3 day course   - IV Ceftriazone discontinued  - given infiltrated line and improving hypoxia, transitioned to oral doxycycline for a total 5 day course   - f/u sputum and blood cultures, NGTD  - Duo-nebs Q6H prn discontinued and started Xopenex given elevated HR  - continue home Fluticasone-vilanterol   - Prednisone 40 mg daily for 4 days, received steroid injection at urgent care  - Wean oxygen, keep sats >92%  - replacing electrolytes as needed

## 2020-10-02 VITALS
WEIGHT: 126.13 LBS | HEART RATE: 89 BPM | TEMPERATURE: 97 F | BODY MASS INDEX: 21.01 KG/M2 | SYSTOLIC BLOOD PRESSURE: 150 MMHG | DIASTOLIC BLOOD PRESSURE: 79 MMHG | OXYGEN SATURATION: 95 % | HEIGHT: 65 IN | RESPIRATION RATE: 19 BRPM

## 2020-10-02 LAB
ALBUMIN SERPL BCP-MCNC: 3 G/DL (ref 3.5–5.2)
ALP SERPL-CCNC: 69 U/L (ref 55–135)
ALT SERPL W/O P-5'-P-CCNC: 25 U/L (ref 10–44)
ANION GAP SERPL CALC-SCNC: 11 MMOL/L (ref 8–16)
AST SERPL-CCNC: 19 U/L (ref 10–40)
BASOPHILS # BLD AUTO: 0.03 K/UL (ref 0–0.2)
BASOPHILS NFR BLD: 0.2 % (ref 0–1.9)
BILIRUB SERPL-MCNC: 0.4 MG/DL (ref 0.1–1)
BUN SERPL-MCNC: 26 MG/DL (ref 8–23)
CALCIUM SERPL-MCNC: 9.5 MG/DL (ref 8.7–10.5)
CHLORIDE SERPL-SCNC: 105 MMOL/L (ref 95–110)
CO2 SERPL-SCNC: 25 MMOL/L (ref 23–29)
CREAT SERPL-MCNC: 1 MG/DL (ref 0.5–1.4)
DIFFERENTIAL METHOD: ABNORMAL
EOSINOPHIL # BLD AUTO: 0 K/UL (ref 0–0.5)
EOSINOPHIL NFR BLD: 0.2 % (ref 0–8)
ERYTHROCYTE [DISTWIDTH] IN BLOOD BY AUTOMATED COUNT: 13.2 % (ref 11.5–14.5)
EST. GFR  (AFRICAN AMERICAN): >60 ML/MIN/1.73 M^2
EST. GFR  (NON AFRICAN AMERICAN): 52.2 ML/MIN/1.73 M^2
GLUCOSE SERPL-MCNC: 306 MG/DL (ref 70–110)
HCT VFR BLD AUTO: 43.9 % (ref 37–48.5)
HGB BLD-MCNC: 13.9 G/DL (ref 12–16)
IMM GRANULOCYTES # BLD AUTO: 0.3 K/UL (ref 0–0.04)
IMM GRANULOCYTES NFR BLD AUTO: 1.6 % (ref 0–0.5)
LYMPHOCYTES # BLD AUTO: 2.2 K/UL (ref 1–4.8)
LYMPHOCYTES NFR BLD: 11.8 % (ref 18–48)
MAGNESIUM SERPL-MCNC: 2.1 MG/DL (ref 1.6–2.6)
MCH RBC QN AUTO: 30 PG (ref 27–31)
MCHC RBC AUTO-ENTMCNC: 31.7 G/DL (ref 32–36)
MCV RBC AUTO: 95 FL (ref 82–98)
MONOCYTES # BLD AUTO: 1.2 K/UL (ref 0.3–1)
MONOCYTES NFR BLD: 6.2 % (ref 4–15)
NEUTROPHILS # BLD AUTO: 14.9 K/UL (ref 1.8–7.7)
NEUTROPHILS NFR BLD: 80 % (ref 38–73)
NRBC BLD-RTO: 0 /100 WBC
PLATELET # BLD AUTO: 356 K/UL (ref 150–350)
PMV BLD AUTO: 12 FL (ref 9.2–12.9)
POCT GLUCOSE: 237 MG/DL (ref 70–110)
POCT GLUCOSE: 317 MG/DL (ref 70–110)
POCT GLUCOSE: 356 MG/DL (ref 70–110)
POTASSIUM SERPL-SCNC: 5 MMOL/L (ref 3.5–5.1)
PROT SERPL-MCNC: 6.4 G/DL (ref 6–8.4)
RBC # BLD AUTO: 4.64 M/UL (ref 4–5.4)
SODIUM SERPL-SCNC: 141 MMOL/L (ref 136–145)
WBC # BLD AUTO: 18.62 K/UL (ref 3.9–12.7)

## 2020-10-02 PROCEDURE — 83735 ASSAY OF MAGNESIUM: CPT

## 2020-10-02 PROCEDURE — 63600175 PHARM REV CODE 636 W HCPCS: Performed by: STUDENT IN AN ORGANIZED HEALTH CARE EDUCATION/TRAINING PROGRAM

## 2020-10-02 PROCEDURE — 36415 COLL VENOUS BLD VENIPUNCTURE: CPT

## 2020-10-02 PROCEDURE — 99239 PR HOSPITAL DISCHARGE DAY,>30 MIN: ICD-10-PCS | Mod: ,,, | Performed by: HOSPITALIST

## 2020-10-02 PROCEDURE — 94640 AIRWAY INHALATION TREATMENT: CPT

## 2020-10-02 PROCEDURE — 80053 COMPREHEN METABOLIC PANEL: CPT

## 2020-10-02 PROCEDURE — 99900035 HC TECH TIME PER 15 MIN (STAT)

## 2020-10-02 PROCEDURE — 94761 N-INVAS EAR/PLS OXIMETRY MLT: CPT

## 2020-10-02 PROCEDURE — 25000242 PHARM REV CODE 250 ALT 637 W/ HCPCS: Performed by: STUDENT IN AN ORGANIZED HEALTH CARE EDUCATION/TRAINING PROGRAM

## 2020-10-02 PROCEDURE — 99239 HOSP IP/OBS DSCHRG MGMT >30: CPT | Mod: ,,, | Performed by: HOSPITALIST

## 2020-10-02 PROCEDURE — 85025 COMPLETE CBC W/AUTO DIFF WBC: CPT

## 2020-10-02 PROCEDURE — 25000003 PHARM REV CODE 250: Performed by: STUDENT IN AN ORGANIZED HEALTH CARE EDUCATION/TRAINING PROGRAM

## 2020-10-02 PROCEDURE — 27000646 HC AEROBIKA DEVICE

## 2020-10-02 PROCEDURE — 25000003 PHARM REV CODE 250: Performed by: HOSPITALIST

## 2020-10-02 PROCEDURE — 63600175 PHARM REV CODE 636 W HCPCS: Performed by: HOSPITALIST

## 2020-10-02 PROCEDURE — 94664 DEMO&/EVAL PT USE INHALER: CPT

## 2020-10-02 RX ORDER — DOXYCYCLINE HYCLATE 100 MG
100 TABLET ORAL EVERY 12 HOURS
Qty: 3 TABLET | Refills: 0 | Status: CANCELLED | OUTPATIENT
Start: 2020-10-02 | End: 2020-10-04

## 2020-10-02 RX ORDER — PEN NEEDLE, DIABETIC 30 GX3/16"
NEEDLE, DISPOSABLE MISCELLANEOUS
Qty: 100 EACH | Refills: 11 | Status: SHIPPED | OUTPATIENT
Start: 2020-10-02 | End: 2021-03-18 | Stop reason: SDUPTHER

## 2020-10-02 RX ORDER — LABETALOL HCL 20 MG/4 ML
20 SYRINGE (ML) INTRAVENOUS EVERY 4 HOURS PRN
Status: DISCONTINUED | OUTPATIENT
Start: 2020-10-02 | End: 2020-10-02

## 2020-10-02 RX ORDER — INSULIN ASPART 100 [IU]/ML
3 INJECTION, SOLUTION INTRAVENOUS; SUBCUTANEOUS ONCE
Status: COMPLETED | OUTPATIENT
Start: 2020-10-02 | End: 2020-10-02

## 2020-10-02 RX ORDER — DOXYCYCLINE HYCLATE 100 MG
100 TABLET ORAL EVERY 12 HOURS
Qty: 2 TABLET | Refills: 0 | Status: SHIPPED | OUTPATIENT
Start: 2020-10-02 | End: 2020-10-03

## 2020-10-02 RX ORDER — PREDNISONE 20 MG/1
40 TABLET ORAL DAILY
Status: DISCONTINUED | OUTPATIENT
Start: 2020-10-02 | End: 2020-10-02 | Stop reason: HOSPADM

## 2020-10-02 RX ORDER — DOXYCYCLINE HYCLATE 100 MG
100 TABLET ORAL EVERY 12 HOURS
Status: DISCONTINUED | OUTPATIENT
Start: 2020-10-02 | End: 2020-10-02 | Stop reason: HOSPADM

## 2020-10-02 RX ORDER — AMLODIPINE BESYLATE 5 MG/1
5 TABLET ORAL DAILY
Qty: 30 TABLET | Refills: 2 | Status: SHIPPED | OUTPATIENT
Start: 2020-10-03 | End: 2020-11-18 | Stop reason: SINTOL

## 2020-10-02 RX ORDER — PREDNISONE 20 MG/1
40 TABLET ORAL DAILY
Qty: 1 TABLET | Refills: 0 | Status: CANCELLED | OUTPATIENT
Start: 2020-10-02 | End: 2020-10-03

## 2020-10-02 RX ORDER — AMLODIPINE BESYLATE 5 MG/1
5 TABLET ORAL DAILY
Status: DISCONTINUED | OUTPATIENT
Start: 2020-10-02 | End: 2020-10-02 | Stop reason: HOSPADM

## 2020-10-02 RX ADMIN — OLOPATADINE HYDROCHLORIDE 1 DROP: 2 SOLUTION OPHTHALMIC at 08:10

## 2020-10-02 RX ADMIN — INSULIN ASPART 2 UNITS: 100 INJECTION, SOLUTION INTRAVENOUS; SUBCUTANEOUS at 08:10

## 2020-10-02 RX ADMIN — INSULIN ASPART 3 UNITS: 100 INJECTION, SOLUTION INTRAVENOUS; SUBCUTANEOUS at 11:10

## 2020-10-02 RX ADMIN — FLUTICASONE FUROATE AND VILANTEROL TRIFENATATE 1 PUFF: 200; 25 POWDER RESPIRATORY (INHALATION) at 07:10

## 2020-10-02 RX ADMIN — HYDRALAZINE HYDROCHLORIDE 25 MG: 25 TABLET, FILM COATED ORAL at 03:10

## 2020-10-02 RX ADMIN — INSULIN ASPART 3 UNITS: 100 INJECTION, SOLUTION INTRAVENOUS; SUBCUTANEOUS at 01:10

## 2020-10-02 RX ADMIN — INSULIN ASPART 3 UNITS: 100 INJECTION, SOLUTION INTRAVENOUS; SUBCUTANEOUS at 08:10

## 2020-10-02 RX ADMIN — AMLODIPINE BESYLATE 5 MG: 5 TABLET ORAL at 12:10

## 2020-10-02 RX ADMIN — PREDNISONE 40 MG: 20 TABLET ORAL at 08:10

## 2020-10-02 RX ADMIN — DOXYCYCLINE HYCLATE 100 MG: 100 TABLET, COATED ORAL at 08:10

## 2020-10-02 RX ADMIN — INSULIN ASPART 4 UNITS: 100 INJECTION, SOLUTION INTRAVENOUS; SUBCUTANEOUS at 11:10

## 2020-10-02 RX ADMIN — FLUTICASONE FUROATE AND VILANTEROL TRIFENATATE 1 PUFF: 200; 25 POWDER RESPIRATORY (INHALATION) at 08:10

## 2020-10-02 RX ADMIN — LEVALBUTEROL HYDROCHLORIDE 0.63 MG: 0.63 SOLUTION RESPIRATORY (INHALATION) at 07:10

## 2020-10-02 RX ADMIN — INSULIN DETEMIR 7 UNITS: 100 INJECTION, SOLUTION SUBCUTANEOUS at 08:10

## 2020-10-02 NOTE — ASSESSMENT & PLAN NOTE
- continue home Irbesartan 300 mg daily   - starting amlodipine 5 mg qd  - PCP f/u to reassess regimen

## 2020-10-02 NOTE — DISCHARGE INSTRUCTIONS
You were diagnosed and asthma exacerbation and right middle lobe pneumonia. We treated you with antibiotics, steroids, and nebulizer inhalers. During your hospitalization you developed high glucose readings and high blood pressure readings. We are starting you on 10 U long-acting injectable insulin, until your sugars improve, follow up with your primary care doctor to discuss continued diabetes management. We also started you on amlodipine 5 mg daily for your uncontrolled hypertension, follow up with your primary care doctor. Try to check your blood sugars and blood pressure regularly at home and keep a log. As always, if you have worsening shortness of breath, CP, dizziness or lightheadedness, if you are passing out, or becoming sweaty and shaking after taking insulin, please see a doctor or visit an emergency department.

## 2020-10-02 NOTE — NURSING
Patient set to discharge.  Medications reviewed, discharge paperwork given to patient.  Being taken home by family.  [facility].

## 2020-10-02 NOTE — ASSESSMENT & PLAN NOTE
Takes Amaryl at home, A1c 9.3%.   - Diabetic/Cardiac diet  - starting 10 U long-acting at discharge due to uncontrolled DM  - given elevated A1c on Amaryl, will need to follow up with PCP for DM management

## 2020-10-02 NOTE — PLAN OF CARE
[facility]    HOME HEALTH ORDERS  FACE TO FACE ENCOUNTER    Patient Name: Sussy Costa  YOB: 1935    PCP: EZ Casillas MD   PCP Address: [address] [facility] [address] [place]  PCP Phone Number: [phone]  PCP Fax: [phone]    Encounter Date: 10/02/2020    Admit to Home Health    Diagnoses:  Active Hospital Problems    Diagnosis  POA    *Acute hypoxemic respiratory failure [J96.01]  Yes    Pneumonia of right middle lobe due to infectious organism [J18.9]  Yes    New onset a-fib [I48.91]  Yes    Dyslipidemia associated with type 2 diabetes mellitus [E11.69, E78.5]  Yes    Asthma with exacerbation [J45.901]  Yes    Hypertension [I10]  Yes    Hyperlipidemia [E78.5]  Yes    Diabetes mellitus, type II [E11.9]  Yes      Resolved Hospital Problems   No resolved problems to display.       Future Appointments   Date Time Provider Department Center   10/15/2020 10:40 AM PAULA Casillas MD [facility]   10/20/2020  9:30 AM Joshua Martines MD [facility] [facility]     Follow-up Information     EZ Casillas MD On 10/15/2020.    Specialty: Internal Medicine  Why: Hospital Follow-up Thursday 10/15 @ 10:40am  Contact information:  [address] [facility]  [place] [address]  [phone]                     I have seen and examined this patient face to face today. My clinical findings that support the need for the home health skilled services and home bound status are the following:  Requiring assistive device to leave home due to unsteady gait caused by  Infection.  Medical restrictions requiring assistance of another human to leave home due to  Dyspnea on exertion (SOB).    Allergies:  Review of patient's allergies indicates:   Allergen Reactions    Aspirin Other (See Comments)     Asthma    TRIAD OF NASAL POLYPS, ASA  ALLERGY AND ASTHMA.        Aspirin Other (See Comments)    Nsaids (non-steroidal anti-inflammatory drug) Other (See  Comments)     AVOID DUE TO TRIAD OF ASA ALLERGY, NASAL POLYPS,AND ASTHMA    Penicillin      Other reaction(s): Rash    9/30/20: tolerates ceftriaxone       Diet: diabetic diet: 2000 calorie    Activities: activity as tolerated    Nursing:   SN to complete comprehensive assessment including routine vital signs. Instruct on disease process and s/s of complications to report to MD. Review/verify medication list sent home with the patient at time of discharge  and instruct patient/caregiver as needed. Frequency may be adjusted depending on start of care date.    Notify MD if SBP > 160 or < 90; DBP > 90 or < 50; HR > 120 or < 50; Temp > 101      CONSULTS:    Physical Therapy to evaluate and treat. Evaluate for home safety and equipment needs; Establish/upgrade home exercise program. Perform / instruct on therapeutic exercises, gait training, transfer training, and Range of Motion.  Occupational Therapy to evaluate and treat. Evaluate home environment for safety and equipment needs. Perform/Instruct on transfers, ADL training, ROM, and therapeutic exercises.    MISCELLANEOUS CARE:  Diabetic Care:   SN to perform and educate Diabetic management with blood glucose monitoring:, Fingerstick blood sugar AC and HS and Report CBG < 60 or > 350 to physician.    WOUND CARE ORDERS  n/a      Medications: Review discharge medications with patient and family and provide education.      Current Discharge Medication List      START taking these medications    Details   amLODIPine (NORVASC) 5 MG tablet Take 1 tablet (5 mg total) by mouth once daily.  Qty: 30 tablet, Refills: 2    Comments: .      doxycycline (VIBRA-TABS) 100 MG tablet Take 1 tablet (100 mg total) by mouth every 12 (twelve) hours. for 2 doses  Qty: 2 tablet, Refills: 0      insulin detemir U-100 (LEVEMIR FLEXTOUCH) 100 unit/mL (3 mL) SubQ InPn pen Inject 10 Units into the skin once daily.  Qty: 3 mL, Refills: 2         CONTINUE these medications which have NOT CHANGED     Details   albuterol (ACCUNEB) 1.25 mg/3 mL Nebu Take 3 mLs (1.25 mg total) by nebulization every 6 (six) hours as needed. Rescue  Qty: 1 Box, Refills: 0    Associated Diagnoses: Exacerbation of asthma, unspecified asthma severity, unspecified whether persistent; Wheezing      albuterol (PROAIR HFA) 90 mcg/actuation inhaler Inhale 2 puffs into the lungs every 6 (six) hours as needed for Wheezing. Rescue  Qty: 1 Inhaler, Refills: 2    Associated Diagnoses: Wheezing      atorvastatin (LIPITOR) 20 MG tablet TAKE 1 TABLET BY MOUTH DAILY IN THE EVENING FOR CHOLESTEROL  Qty: 90 tablet, Refills: 0      blood sugar diagnostic Strp 1 strip by Misc.(Non-Drug; Combo Route) route once daily.  Qty: 100 strip, Refills: 11      fluticasone-salmeterol diskus inhaler 500-50 mcg Inhale 1 puff into the lungs 2 (two) times daily.  Qty: 180 each, Refills: 3      glimepiride (AMARYL) 2 MG tablet TAKE ONE TABLET BY MOUTH IN THE MORNING WITH BREAKFAST  Qty: 90 tablet, Refills: 3      irbesartan (AVAPRO) 300 MG tablet Take 1 tablet (300 mg total) by mouth every evening.  Qty: 90 tablet, Refills: 3      lancets (MICROLET LANCET) Misc Check blood sugar 2 times daily  Qty: 100 each, Refills: 3    Comments: Lancets for [employer] meter      latanoprost (XALATAN) 0.005 % ophthalmic solution Inject into the eye. 1 Drops Ophthalmic Every evening      levocetirizine (XYZAL) 5 MG tablet Take 1 tablet (5 mg total) by mouth every evening.  Qty: 30 tablet, Refills: 11      magnesium oxide (MAG-OX) 400 mg (241.3 mg magnesium) tablet Take 400 mg by mouth once daily.      olopatadine (PATANOL) 0.1 % ophthalmic solution Place 1 drop into both eyes 2 (two) times daily as needed. 1 Drops Ophthalmic Twice a day       risedronate (ACTONEL) 35 MG tablet TAKE 1 TABLET BY MOUTH EVERY 7 DAYS  Qty: 12 tablet, Refills: 3    Associated Diagnoses: Type 2 diabetes mellitus with microalbuminuria, without long-term current use of insulin      vitamin D (VITAMIN D3) 1000  units Tab Take 1,000 Units by mouth once daily.      montelukast (SINGULAIR) 10 mg tablet Take 1 tablet (10 mg total) by mouth nightly.  Qty: 90 tablet, Refills: 0             I certify that this patient is confined to her home and needs intermittent skilled nursing care, physical therapy and occupational therapy.      Janet Thomas MD

## 2020-10-02 NOTE — PLAN OF CARE
Patient stated that she did not want home health services at this time.      Future Appointments   Date Time Provider Department Center   10/15/2020 10:40 AM PAULA Casillas MD [facility] [place]   10/20/2020  9:30 AM Joshua Martines MD [facility]           10/02/20 1556   Final Note   Assessment Type Final Discharge Note   Anticipated Discharge Disposition Home   What phone number can be called within the next 1-3 days to see how you are doing after discharge? [phone]   Hospital Follow Up  Appt(s) scheduled? Yes   Right Care Referral Info   Post Acute Recommendation No Care   Post-Acute Status   Post-Acute Authorization Other   Other Status No Post-Acute Service Needs   Discharge Delays None known at this time

## 2020-10-02 NOTE — PLAN OF CARE
10/02/20 1440   Post-Acute Status   Post-Acute Authorization Home Health   Home Health Status Patient/Family Changed Mind   Discharge Delays None known at this time   Discharge Plan   Discharge Plan A Home with family   Discharge Plan B Home     SW met with pt and her spouse and was told that she did NOT want any HH services.  Pt stated that she feels she does not need HH at this time & instructed SW not to send referrals[place]  [id number]

## 2020-10-02 NOTE — ASSESSMENT & PLAN NOTE
Sussy Costa is an 84 year old female with HTN, DM2, asthma, and hyperlipidemia who presents with shortness of breath and wheezing. CXR concerning for RML pneumonia from Urgent Care. Patient afebrile and hemodynamically stable on arrival. Requiring 2 L NC and given Duonebs. WBC 13.1 and lactate wnl. COVID negative. BNP 79.    Increasing WBC in the setting of prednisone. No new fevers, improving hypoxia, transitioned to oral abx.     Plan   - will treat for RML pneumonia and asthma exacerbation   - IV Azithromycin for 1 day, transitioned to oral for a total 3 day course   - IV Ceftriazone discontinued  - given infiltrated line and improving hypoxia, transitioned to oral doxycycline for a total 5 day course   - f/u sputum and blood cultures, NGTD  - Prednisone 40 mg daily course while hospitalized  - Weaned to RA, continuing 3 remaining doxycycline doses

## 2020-10-02 NOTE — NURSING
Pt BP elevated above 200's administered prn Hydralazine, rechecked bp manually twice by two RN's 193/100. Notifed Doctor on call, no orders given said he will wait for the primary team to address.

## 2020-10-02 NOTE — DISCHARGE SUMMARY
[facility] Hospital Medicine  Discharge Summary      Patient Name: Sussy Costa  MRN: [id number]  Admission Date: 9/28/2020  Hospital Length of Stay: 4 days  Discharge Date and Time:  10/02/2020 3:29 PM  Attending Physician: Krystin Sebastian MD   Discharging Provider: Janet Thomas MD  Primary Care Provider: EZ Casillas MD  [facility] Medicine Team: [facility] HOSP MED 2 Janet Thomas MD    HPI:   Sussy Costa is an 84 year old female with HTN, DM2, asthma, and hyperlipidemia who presents with a 7 day history of shortness of breath, cough, wheezing, and some sputum production. The shortness of breath is with walking and mild exertion. Denies any shortness of breath at rest. Does not use home oxygen. Has been coughing up some grey sputum the past few days. Over the past 3 days, patient has had to use her inhaler more frequently. Patient was initially seen at Urgent Care and then was told to go to ED. At the Urgent Care, she was given Duo-nebs and a steroid injection. CXR was completed there and concerning for RML pneumonia. Patient is COVID negative. Her  was recently admitted for pneumonia and has been visiting him in the hospital. Denies any recent abx use. Denies any recent travel. Patient denies fevers, chills, chest pain, abdominal pain, and N/V/D.      * No surgery found *      Hospital Course:   Admitted to hospital medicine on 9/28/20 with acute hypoxic respiratory failure 2/2 RML pneumonia and asthma exacerbation. Treated with ceftriaxone, azithromycin, prednisone, duo-nebs, and home Breo Ellipta. Episode of tachycardia and PACs, possible A-fib, patient is rate controlled w/out medications, CHADSVASC>2 however does not wish to be on anti-coagulation. Abx switched to PO doxycycline, improved work of breathing, wheezing, stating on RA at discharge. Course c/b asymptomatic hyperglycemia and hypertension, improved control prior to d/c. Starting amlodipine 5 mg qd and long-acting  insulin 10 U qhs at discharge to be reassessed by pcp.      Consults: n/a    Physical Exam  Vitals signs and nursing note reviewed.   Constitutional:       Appearance: Normal appearance.   HENT:      Head: Normocephalic and atraumatic.      Mouth/Throat:      Mouth: Mucous membranes are moist.   Eyes:      General: No scleral icterus.     Extraocular Movements: Extraocular movements intact.   Neck:      Musculoskeletal: Normal range of motion and neck supple.   Cardiovascular:      Rate and Rhythm: Regular rhythm. Tachycardia present.   Pulmonary:      Effort: Pulmonary effort is normal.      Comments: Wheezes and course breath sounds bilaterally, improving  On Room Air  Abdominal:      General: There is no distension.      Palpations: Abdomen is soft.   Musculoskeletal: Normal range of motion.      Comments: no edema  Skin:     General: Skin is warm and dry.      Capillary Refill: Capillary refill takes less than 2 seconds.   Neurological:      Mental Status: She is alert.       * Acute hypoxemic respiratory failure  Sussy Costa is an 84 year old female with HTN, DM2, asthma, and hyperlipidemia who presents with shortness of breath and wheezing. CXR concerning for RML pneumonia from Urgent Care. Patient afebrile and hemodynamically stable on arrival. Requiring 2 L NC and given Duonebs. WBC 13.1 and lactate wnl. COVID negative. BNP 79.    Increasing WBC in the setting of prednisone. No new fevers, improving hypoxia, transitioned to oral abx.     Plan   - will treat for RML pneumonia and asthma exacerbation   - IV Azithromycin for 1 day, transitioned to oral for a total 3 day course   - IV Ceftriazone discontinued  - given infiltrated line and improving hypoxia, transitioned to oral doxycycline for a total 5 day course   - f/u sputum and blood cultures, NGTD  - Prednisone 40 mg daily course while hospitalized  - Weaned to RA, continuing 3 remaining doxycycline doses    New onset a-fib  - New onset Afib on 9/28/20  seen on EKG, tachycardia with PACs  - Patient denies prior history of Afib  - CHADs-VASc of 5  - Briefly discussed anticoagulation with patient and she wants to hold off for now. Can re-address as an outpatient.  - DVT prophylaxis for now   - keep K>4 and Mg>2  - TSH wnl   - HR in the 70s-100s, ctm, may consider starting BB however, contraindicated in asthma exacerbation    Pneumonia of right middle lobe due to infectious organism  - see acute hypoxic respiratory failure      Dyslipidemia associated with type 2 diabetes mellitus  - continue statin    Asthma with exacerbation  - see acute hypoxic respiratory failure  - continue pta nebs    Hypertension  - continue home Irbesartan 300 mg daily   - starting amlodipine 5 mg qd  - PCP f/u to reassess regimen    Hyperlipidemia  F/u with PCP      Diabetes mellitus, type II  Takes Amaryl at home, A1c 9.3%.   - Diabetic/Cardiac diet  - starting 10 U long-acting at discharge due to uncontrolled DM  - given elevated A1c on Amaryl, will need to follow up with PCP for DM management     Final Active Diagnoses:    Diagnosis Date Noted POA    PRINCIPAL PROBLEM:  Acute hypoxemic respiratory failure [J96.01] 12/29/2017 Yes    Pneumonia of right middle lobe due to infectious organism [J18.9] 09/28/2020 Yes    New onset a-fib [I48.91] 09/28/2020 Yes    Dyslipidemia associated with type 2 diabetes mellitus [E11.69, E78.5] 11/09/2017 Yes    Asthma with exacerbation [J45.901] 04/22/2013 Yes    Hypertension [I10]  Yes    Hyperlipidemia [E78.5]  Yes    Diabetes mellitus, type II [E11.9] 09/24/2012 Yes      Problems Resolved During this Admission:       Discharged Condition: stable    Disposition: home w home health    Follow Up:  Follow-up Information     P Papa Casillas MD On 10/15/2020.    Specialty: Internal Medicine  Why: Hospital Follow-up Thursday 10/15 @ 10:40am  Contact information:  [address] [facility] [place] [address]  [phone]                 Patient  "Instructions:   No discharge procedures on file.    Significant Diagnostic Studies: Labs:   CMP   Recent Labs   Lab 09/30/20  2119 10/01/20  0332 10/02/20  0357    141 141   K 4.5 4.6 5.0    105 105   CO2 26 26 25   * 200* 306*   BUN 37* 33* 26*   CREATININE 1.3 1.1 1.0   CALCIUM 9.6 9.5 9.5   PROT  --  6.2 6.4   ALBUMIN  --  2.9* 3.0*   BILITOT  --  0.4 0.4   ALKPHOS  --  63 69   AST  --  10 19   ALT  --  16 25   ANIONGAP 11 10 11   ESTGFRAFRICA 43.8* 53.7* >60.0   EGFRNONAA 38.0* 46.5* 52.2*    and CBC   Recent Labs   Lab 10/01/20  0332 10/02/20  0357   WBC 19.19* 18.62*   HGB 13.1 13.9   HCT 41.7 43.9    356*       Pending Diagnostic Studies:     None         Medications:  Reconciled Home Medications:      Medication List      START taking these medications    amLODIPine 5 MG tablet  Commonly known as: NORVASC  Take 1 tablet (5 mg total) by mouth once daily.  Start taking on: October 3, 2020     doxycycline 100 MG tablet  Commonly known as: VIBRA-TABS  Take 1 tablet (100 mg total) by mouth every 12 (twelve) hours. for 2 doses     insulin detemir U-100 100 unit/mL (3 mL) Inpn pen  Commonly known as: LEVEMIR FLEXTOUCH  Inject 10 Units into the skin once daily.     pen needle, diabetic 31 gauge x 5/16" Ndle  Use with insulin pen        CONTINUE taking these medications    * albuterol 1.25 mg/3 mL Nebu  Commonly known as: ACCUNEB  Take 3 mLs (1.25 mg total) by nebulization every 6 (six) hours as needed. Rescue     * albuterol 90 mcg/actuation inhaler  Commonly known as: PROAIR HFA  Inhale 2 puffs into the lungs every 6 (six) hours as needed for Wheezing. Rescue     atorvastatin 20 MG tablet  Commonly known as: LIPITOR  TAKE 1 TABLET BY MOUTH DAILY IN THE EVENING FOR CHOLESTEROL     blood sugar diagnostic Strp  1 strip by Misc.(Non-Drug; Combo Route) route once daily.     fluticasone-salmeterol 500-50 mcg/dose 500-50 mcg/dose Dsdv diskus inhaler  Commonly known as: ADVAIR  Inhale 1 puff into " the lungs 2 (two) times daily.     glimepiride 2 MG tablet  Commonly known as: AMARYL  TAKE ONE TABLET BY MOUTH IN THE MORNING WITH BREAKFAST     irbesartan 300 MG tablet  Commonly known as: AVAPRO  Take 1 tablet (300 mg total) by mouth every evening.     lancets Misc  Commonly known as: MICROLET LANCET  Check blood sugar 2 times daily     levocetirizine 5 MG tablet  Commonly known as: XYZAL  Take 1 tablet (5 mg total) by mouth every evening.     magnesium oxide 400 mg (241.3 mg magnesium) tablet  Commonly known as: MAG-OX  Take 400 mg by mouth once daily.     montelukast 10 mg tablet  Commonly known as: SINGULAIR  Take 1 tablet (10 mg total) by mouth nightly.     PatanoL 0.1 % ophthalmic solution  Generic drug: olopatadine  Place 1 drop into both eyes 2 (two) times daily as needed. 1 Drops Ophthalmic Twice a day     risedronate 35 MG tablet  Commonly known as: ACTONEL  TAKE 1 TABLET BY MOUTH EVERY 7 DAYS     vitamin D 1000 units Tab  Commonly known as: VITAMIN D3  Take 1,000 Units by mouth once daily.     XALATAN 0.005 % ophthalmic solution  Generic drug: latanoprost  Inject into the eye. 1 Drops Ophthalmic Every evening         * This list has 2 medication(s) that are the same as other medications prescribed for you. Read the directions carefully, and ask your doctor or other care provider to review them with you.                Indwelling Lines/Drains at time of discharge:     Pure wick removed prior to d/c    Lines/Drains/Airways     Drain            Female External Urinary Catheter 09/28/20 2320 3 days                Time spent on the discharge of patient: 15 minutes  Patient was seen and examined on the date of discharge and determined to be suitable for discharge.         Janet Thomas MD  Department of Hospital Medicine  [facility]

## 2020-10-02 NOTE — NURSING
Taught patient how to administer insulin.  Had patient demonstrate teaching but was not sure if patient successfully injected insulin at 1215.  Rechecked insulin one hour later and had  Put in a one time order for insulin.  3 units were given at 1330.

## 2020-10-02 NOTE — PLAN OF CARE
Problem: Adult Inpatient Plan of Care  Goal: Plan of Care Review  Outcome: Ongoing, Progressing  Flowsheets (Taken 10/2/2020 6654)  Plan of Care Reviewed With: patient     Pt AAO*4, calm ,cooperative. Elevated Blood pressure, MD on call aware. BG monitoring, supplemental insulin administered per orders. Pt possible D/C today. Safely maintained overnight, questions and concerns addressed at the bedside. Will continue to monitor.

## 2020-10-03 LAB
BACTERIA BLD CULT: NORMAL
BACTERIA BLD CULT: NORMAL

## 2020-10-05 ENCOUNTER — PATIENT OUTREACH (OUTPATIENT)
Dept: ADMINISTRATIVE | Facility: CLINIC | Age: 85
End: 2020-10-05

## 2020-10-05 NOTE — PATIENT INSTRUCTIONS
"Dyspnea (Shortness Of Breath)  Shortness of Breath (also known as "Dyspnea") is the sense that you can't catch your breath or can't get enough air.  Dyspnea can be caused by many different conditions such as:   Acute asthma attack   Worsening of emphysema (also called "COPD") -- a lung disease that is caused by smoking   A mucus plug blocks a large air passage in the lung -- this can occur with emphysema or chronic bronchitis   Congestive Heart Failure ("CHF") -- when a weak heart muscle allows excess fluid to collect in the lungs   Panic attacks, anxiety -- fear can cause rapid breathing ("hyperventilation")   Pneumonia -- infection in the lung tissue   Exposure to toxic fumes or smoke   Pulmonary embolus (blood clot to the lung)  Based on your visit today, the exact cause of your shortness of breath is not certain. Your tests do not show any of the serious causes of dyspnea. Sometimes, further testing is needed to find out if a serious problem exists. Therefore, it is important for you to watch for any new symptoms or worsening of your condition and follow up with your doctor as directed.  Home Care:   When your symptoms are better, resume your usual activities.   If you smoke, you need to stop. Join a stop-smoking program or ask your doctor for help.  Follow Up  with your doctor or as advised by our staff.  Get Prompt Medical Attention  if any of the following occur:   Increasing shortness of breath or wheezing   Redness, pain or swelling in one leg   Swelling in both legs or ankles   Unexpected weight gain   Chest, arm, shoulder, neck or upper back pain   Dizziness, weakness or fainting   Palpitations (the sense that your heart is fluttering, beating fast or hard)   Fever of 100.4ºF (38ºC) or higher, or as directed by your healthcare provider   Cough with dark colored or bloody sputum (mucus)  © 2465-1435 [employer], [address] [facility], [place] [address]. All rights reserved. This " information is not intended as a substitute for professional medical care. Always follow your healthcare professional's instructions.

## 2020-10-12 ENCOUNTER — PATIENT MESSAGE (OUTPATIENT)
Dept: ADMINISTRATIVE | Facility: OTHER | Age: 85
End: 2020-10-12

## 2020-10-13 ENCOUNTER — NURSE TRIAGE (OUTPATIENT)
Dept: ADMINISTRATIVE | Facility: CLINIC | Age: 85
End: 2020-10-13

## 2020-10-14 NOTE — TELEPHONE ENCOUNTER
"Pt is concerned because she primed her Levemir pen, but did not dial up insulin dose before sticking herself. Pt advised that she likely did not get any insulin when she stuck herself. Pt advised to recheck blood glucose. Pt reports blood sugar was 310 at 8:45 PM prior to first injection. Blood sugar is now 268. Pt extremely worried about taking too much insulin. Pt advised to closely monitor sugar, and not inject any more insulin tonight. Pt advised to take oral diabetes medication in AM and check blood glucose. Call MD with any blood sugar issues.    Reason for Disposition   Caller has medication question only, adult not sick, and triager answers question    Additional Information   Negative: MORE THAN A DOUBLE DOSE of a prescription or over-the-counter (OTC) drug   Negative: [1] DOUBLE DOSE (an extra dose or lesser amount) of over-the-counter (OTC) drug AND [2] any symptoms (e.g., dizziness, nausea, pain, sleepiness)   Negative: [1] DOUBLE DOSE (an extra dose or lesser amount) of prescription drug AND [2] any symptoms (e.g., dizziness, nausea, pain, sleepiness)   Negative: Took another person's prescription drug   Negative: [1] DOUBLE DOSE (an extra dose or lesser amount) of prescription drug AND [2] NO symptoms (Exception: a double dose of antibiotics)   Negative: Diabetes drug error or overdose (e.g., took wrong type of insulin or took extra dose)   Negative: [1] Request for URGENT new prescription or refill of "essential" medication (i.e., likelihood of harm to patient if not taken) AND [2] triager unable to fill per unit policy   Negative: [1] Prescription not at pharmacy AND [2] was prescribed by PCP recently   Negative: [1] Pharmacy calling with prescription questions AND [2] triager unable to answer question   Negative: [1] Caller has URGENT medication question about med that PCP or specialist prescribed AND [2] triager unable to answer question   Negative: [1] Caller has NON-URGENT medication " question about med that PCP prescribed AND [2] triager unable to answer question   Negative: [1] Caller requesting a NON-URGENT new prescription or refill AND [2] triager unable to refill per unit policy   Negative: [1] Caller has medication question about med not prescribed by PCP AND [2] triager unable to answer question (e.g., compatibility with other med, storage)   Negative: Caller requesting a CONTROLLED substance prescription refill (e.g., narcotics, ADHD medicines)   Negative: [1] Prescription prescribed recently is not at pharmacy AND [2] triager has access to patient's EMR AND [3] prescription is recorded in the EMR   Negative: [1] DOUBLE DOSE (an extra dose or lesser amount) of over-the-counter (OTC) drug AND [2] NO symptoms   Negative: [1] DOUBLE DOSE (an extra dose or lesser amount) of antibiotic drug AND [2] NO symptoms   Negative: Caller wants to use a complementary or alternative medicine    Protocols used: MEDICATION QUESTION CALL-A-

## 2020-10-15 ENCOUNTER — TELEPHONE (OUTPATIENT)
Dept: INTERNAL MEDICINE | Facility: CLINIC | Age: 85
End: 2020-10-15

## 2020-10-15 ENCOUNTER — OFFICE VISIT (OUTPATIENT)
Dept: INTERNAL MEDICINE | Facility: CLINIC | Age: 85
End: 2020-10-15
Payer: MEDICARE

## 2020-10-15 VITALS
DIASTOLIC BLOOD PRESSURE: 62 MMHG | HEART RATE: 94 BPM | WEIGHT: 132.5 LBS | SYSTOLIC BLOOD PRESSURE: 128 MMHG | BODY MASS INDEX: 22.08 KG/M2 | TEMPERATURE: 99 F | RESPIRATION RATE: 19 BRPM | OXYGEN SATURATION: 96 % | HEIGHT: 65 IN

## 2020-10-15 DIAGNOSIS — J18.9 PNEUMONIA OF RIGHT MIDDLE LOBE DUE TO INFECTIOUS ORGANISM: Primary | ICD-10-CM

## 2020-10-15 DIAGNOSIS — R80.9 TYPE 2 DIABETES MELLITUS WITH MICROALBUMINURIA, WITHOUT LONG-TERM CURRENT USE OF INSULIN: ICD-10-CM

## 2020-10-15 DIAGNOSIS — E11.29 TYPE 2 DIABETES MELLITUS WITH MICROALBUMINURIA, WITHOUT LONG-TERM CURRENT USE OF INSULIN: ICD-10-CM

## 2020-10-15 DIAGNOSIS — J45.901 EXACERBATION OF ASTHMA, UNSPECIFIED ASTHMA SEVERITY, UNSPECIFIED WHETHER PERSISTENT: ICD-10-CM

## 2020-10-15 DIAGNOSIS — E11.59 HYPERTENSION ASSOCIATED WITH DIABETES: ICD-10-CM

## 2020-10-15 DIAGNOSIS — R06.2 WHEEZING: ICD-10-CM

## 2020-10-15 DIAGNOSIS — J45.901 ASTHMA WITH ACUTE EXACERBATION, UNSPECIFIED ASTHMA SEVERITY, UNSPECIFIED WHETHER PERSISTENT: ICD-10-CM

## 2020-10-15 DIAGNOSIS — I15.2 HYPERTENSION ASSOCIATED WITH DIABETES: ICD-10-CM

## 2020-10-15 PROCEDURE — 99999 PR PBB SHADOW E&M-EST. PATIENT-LVL V: ICD-10-PCS | Mod: PBBFAC,,, | Performed by: INTERNAL MEDICINE

## 2020-10-15 PROCEDURE — 99215 OFFICE O/P EST HI 40 MIN: CPT | Mod: PBBFAC,PO | Performed by: INTERNAL MEDICINE

## 2020-10-15 PROCEDURE — 99214 PR OFFICE/OUTPT VISIT, EST, LEVL IV, 30-39 MIN: ICD-10-PCS | Mod: S$PBB,,, | Performed by: INTERNAL MEDICINE

## 2020-10-15 PROCEDURE — 99214 OFFICE O/P EST MOD 30 MIN: CPT | Mod: S$PBB,,, | Performed by: INTERNAL MEDICINE

## 2020-10-15 PROCEDURE — 99999 PR PBB SHADOW E&M-EST. PATIENT-LVL V: CPT | Mod: PBBFAC,,, | Performed by: INTERNAL MEDICINE

## 2020-10-15 RX ORDER — ALBUTEROL SULFATE 1.25 MG/3ML
1.25 SOLUTION RESPIRATORY (INHALATION) EVERY 6 HOURS PRN
Qty: 1 BOX | Refills: 0 | Status: SHIPPED | OUTPATIENT
Start: 2020-10-15 | End: 2021-10-15

## 2020-10-15 RX ORDER — LANCETS
EACH MISCELLANEOUS
Qty: 100 EACH | Refills: 3 | Status: SHIPPED | OUTPATIENT
Start: 2020-10-15 | End: 2021-09-08 | Stop reason: SDUPTHER

## 2020-10-15 NOTE — TELEPHONE ENCOUNTER
----- Message from Jigar Calixto sent at 10/15/2020 11:32 AM CDT -----  Requesting an RX refill or new RX.  Is this a refill or new RX:  refill  RX name and strength: lancets (MICROLET LANCET) Misc  Is this a 30 day or 90 day RX:    Pharmacy name and phone # (copy/paste from chart):   [facility] [place] - [address] [phone] (Phone)  [phone] (Fax)  Comments:

## 2020-10-15 NOTE — TELEPHONE ENCOUNTER
----- Message from Jigar Calixto sent at 10/15/2020 11:30 AM CDT -----  Requesting an RX refill or new RX.  Is this a refill or new RX:  Refill  RX name and strength: blood sugar diagnostic Strp  Is this a 30 day or 90 day RX:    Pharmacy name and phone # (copy/paste from chart):   [facility] - [place], [place] - [address] [phone] (Phone)  [phone] (Fax)  Comments:

## 2020-10-15 NOTE — TELEPHONE ENCOUNTER
----- Message from Jigar Calixto sent at 10/15/2020 11:29 AM CDT -----  Requesting an RX refill or new RX.  Is this a refill or new RX:  Refill  RX name and strength: albuterol (ACCUNEB) 1.25 mg/3 mL Nebu  Is this a 30 day or 90 day RX:    Pharmacy name and phone # (copy/paste from chart):   [facility] - [place], [place] - [address] [phone] (Phone)  [phone] (Fax)  Comments:

## 2020-10-15 NOTE — PROGRESS NOTES
History of present illness:  84-year-old lady in today following up on recent hospitalization for acute respiratory illness-acute asthmatic bronchitis and right middle lobe bronchopneumonia.  COVID negative.  Treated with initial IV antibiotics corticosteroids transitioned to oral.  Currently on oral doxycycline she had brief new onset AFib in the hospital stay which resolved.  Blood sugars obviously blair during her hospitalization.  She was started on low-dose basal insulin in addition to her glimepiride.  Currently reports overall feeling much better.  Still with a slight cough.  Minimal sputum production.  Decreased exertional capacity but slowly improving.  Blood pressure was elevated during hospital stay and amlodipine was added to her antihypertensive regimen.    Current medications:  Medication list noted and reviewed.  Inhalers include her Advair and p.r.n. albuterol at this time    Review of systems:  Constitutional:  No fever no chills.  HEENT:  No hoarseness no dysphagia.  No sinus congestion.  Respiratory:  See HPI.  Cardiovascular:  Denies chest pain palpitations syncope or presyncope.  GI:  No nausea no vomiting no abdominal pain no diarrhea.  Neurologic:  No new neurological symptomatologies.    Past medical history, past surgical history, family medical history social history is are all noted and reviewed in the electronic medical record history sections.    Physical examination:  General:  Pleasant alert appropriately groomed lady no acute distress.  Vital signs:  All noted and reviewed is normal.  Pulse ox on room air 96%  Eyes:  Sclerae white not icteric .  HEENT:  Normocephalic.  Neck supple no masses no thyromegaly.  Lungs:  No rales.  No overt wheezing.  Few scattered coarse rhonchi.  No use of accessory muscles of respiration.  Cardiovascular:  Regular rate and rhythm.  No significant murmur.  No JVD.  No significant peripheral extremity edema.  Mental status:  Alert oriented affect mood all  appropriate.    Data:  Reviewed the recent discharge summary.  Reviewed the recent radiographic images and reports regarding chest x-ray.  Reviewed her recent home blood sugar readings.    Impression:  Recent acute respiratory illness with asthmatic bronchitis and right middle lobe bronchopneumonia now clinically much improved.  Diabetes mellitus recent poor control due to recent illness now on low-dose basal insulin at least temporarily.  Hypertension controlled.  Transient AFib during hospitalization resolved.    Plan:  For now we will continue the current medical regimen.  She will continue to monitor blood sugars and blood pressures and we will plan on seeing her back in 1 month at which time we will update follow-up chest x-ray and lab data.

## 2020-10-19 ENCOUNTER — PATIENT OUTREACH (OUTPATIENT)
Dept: ADMINISTRATIVE | Facility: OTHER | Age: 85
End: 2020-10-19

## 2020-10-19 NOTE — PROGRESS NOTES
Care Everywhere: updated  Immunization: updated(delay in links)   Health Maintenance: updated  Media Review: review for outside eye exam report   Legacy Review:   Order placed:   Upcoming appts:  efax sent to Dr. Martines office to obtain eye exam report

## 2020-10-19 NOTE — LETTER
AUTHORIZATION FOR RELEASE OF   CONFIDENTIAL INFORMATION    Dear Oliver Martines III, MD,    We are seeing Sussy Costa, date of birth 1935, in the clinic at [facility]. EZ Casillas MD is the patient's PCP. Sussy Costa has an outstanding lab/procedure at the time we reviewed her chart. In order to help keep her health information updated, she has authorized us to request the following medical record(s):        (  )  MAMMOGRAM                                      (  )  COLONOSCOPY      (  )  PAP SMEAR                                          (  )  OUTSIDE LAB RESULTS     (  )  DEXA SCAN                                          ( x )  EYE EXAM for            (  )  FOOT EXAM                                          (  )  ENTIRE RECORD     (  )  OUTSIDE IMMUNIZATIONS                 (  )  _______________         Please fax records to [facility], P William Brown, MD, [phone]     If you have any questions, please contact Jonelle Manuel at [phone].           Patient Name: Sussy Costa  : 1935  Patient Phone #: [phone]

## 2020-10-20 ENCOUNTER — OFFICE VISIT (OUTPATIENT)
Dept: ENDOCRINOLOGY | Facility: CLINIC | Age: 85
End: 2020-10-20
Payer: MEDICARE

## 2020-10-20 VITALS
DIASTOLIC BLOOD PRESSURE: 78 MMHG | WEIGHT: 136 LBS | BODY MASS INDEX: 22.66 KG/M2 | SYSTOLIC BLOOD PRESSURE: 140 MMHG | HEIGHT: 65 IN

## 2020-10-20 DIAGNOSIS — E11.29 TYPE 2 DIABETES MELLITUS WITH MICROALBUMINURIA, WITHOUT LONG-TERM CURRENT USE OF INSULIN: ICD-10-CM

## 2020-10-20 DIAGNOSIS — M81.0 SENILE OSTEOPOROSIS: Primary | ICD-10-CM

## 2020-10-20 DIAGNOSIS — E78.5 HYPERLIPIDEMIA, UNSPECIFIED HYPERLIPIDEMIA TYPE: ICD-10-CM

## 2020-10-20 DIAGNOSIS — R80.9 TYPE 2 DIABETES MELLITUS WITH MICROALBUMINURIA, WITHOUT LONG-TERM CURRENT USE OF INSULIN: ICD-10-CM

## 2020-10-20 PROCEDURE — 99214 OFFICE O/P EST MOD 30 MIN: CPT | Mod: S$PBB,,, | Performed by: INTERNAL MEDICINE

## 2020-10-20 PROCEDURE — 99214 PR OFFICE/OUTPT VISIT, EST, LEVL IV, 30-39 MIN: ICD-10-PCS | Mod: S$PBB,,, | Performed by: INTERNAL MEDICINE

## 2020-10-20 PROCEDURE — 99999 PR PBB SHADOW E&M-EST. PATIENT-LVL V: CPT | Mod: PBBFAC,,, | Performed by: INTERNAL MEDICINE

## 2020-10-20 PROCEDURE — 99215 OFFICE O/P EST HI 40 MIN: CPT | Mod: PBBFAC | Performed by: INTERNAL MEDICINE

## 2020-10-20 PROCEDURE — 99999 PR PBB SHADOW E&M-EST. PATIENT-LVL V: ICD-10-PCS | Mod: PBBFAC,,, | Performed by: INTERNAL MEDICINE

## 2020-10-20 RX ORDER — LINAGLIPTIN 5 MG/1
5 TABLET, FILM COATED ORAL DAILY
Qty: 30 TABLET | Refills: 11 | Status: SHIPPED | OUTPATIENT
Start: 2020-10-20 | End: 2020-11-16

## 2020-10-20 RX ORDER — GLIMEPIRIDE 2 MG/1
TABLET ORAL
Qty: 180 TABLET | Refills: 3 | Status: SHIPPED | OUTPATIENT
Start: 2020-10-20 | End: 2021-05-25

## 2020-10-20 NOTE — PROGRESS NOTES
Subjective:      Patient ID: Sussy Costa is a 85 y.o. female.    Chief Complaint:  senile osteoporosis      History of Present Illness  Ms. Subramanianlpresents for follow up of osteoporosis. Last visit 9/2019.     She was given first dose of Prolia in 2/2018 and developed joint pains and significant lower ext edema so it was decided that she would not receive another dose of Prolia. We decided against Reclast for the same reason and instead restarted her on an oral bisphosphonate which she has tolerated without side effects.    Since her last visit she was hospitalized with pneumonia from 9/28-10/2. Her glucoses had already been trending up prior to the illness. She received steroids during the hospitalization which worsened her glucoses. She was discharged on Levemir 10 units qhs, and advised to continue glimepiride 2 mg daily       First diagnosed with osteoporosis in 2007.     Current osteoporosis regimen:  Back on Actonel since 8/2018.  Vitamin D 1000 units daily  No calcium supplementation at present     Osteoporosis medication history:  Actonel  Fosamax  Prolia 60 mg x 1 (1st dose 2/2018), had to stop Prolia     Risk factors for osteoporosis  -Personal history of fracture: had a fracture of left 5th finger, no hx of fragility frax  -Family history of hip fracture or osteoporosis: mother had hip frax at age 92  -Premature/Early menopause: menopause at age 56, on HRT for a few months in the past  -Chronic steroid therapy: intermittently gets steroid taper for asthma  -History of rheumatoid arthritis: none  -Smoking History: never smoked  -Current EtOH use: social EtOH  -Fall Risk: good balance, has tripped and fallen twice in past 10-15 years     Risk for Secondary Osteoporosis:  -TSH: recent TSH WNL  -Calcium: calcium level was high so calcium supplementation stopped. Had elevated PTH but this was shortly after Prolia injection  -Vitamin D: WNL  -Anemia and renal insufficiency: some anemia, no  CKD  -Signs/symptoms of malabsorption: none  -PPI use: none  -Diabetes: Last A1c at goal   -Chronic liver disease: none  -Hx gastric bypass surgery: none     Height loss:  1/2 inch height loss     Exercise:  No formal exercise  No falls     Pending dental work:  None     Last Bone Density Scan dated 11/2019:  COMPARISON:  Comparison study done on 11/13/2017. Lumbar spine BMD 0.870 g/cm2 and the T-score -1.6.  The Total Hip BMD 0.629 g/cm2 and the T-score -2.6.     FINDINGS:  Lumbar Spine: Lumbar bone mineral density L1-L4 is 0.830g/cm2, which is a T-score of -2.0. The Z-score is 0.8.     Total Hip: Total hip bone mineral density is 0.628g/cm2.  The T-score is -2.6, and the Z-score is -0.3.     Femoral neck: Bone mineral density is 0.540g/cm2 and the T-score is -2.8 and the Z-score is -0.3 g/cm2.     There is a 20% risk of a major osteoporotic fracture and a 7.8% risk of hip fracture in the next 10 years (FRAX).     Compared with previous DXA, BMD at the lumbar spine has decreased by -4.6%, and the BMD at the total hip has remained stable.        Diabetes has been uncontrolled. Tried metformin in the past but stopped due to significant diarrhea. Currently on Levemir 10 units qhs and glimepiride 2 mg once daily. Fasting glucoses have been closer to goal but still having prandial excursions. Not on oral steroids. Denies hypoglycemia or hypoglycemic s/s. No hx of pancreatitis. Has hx of pancreatic cysts.             Review of Systems   Constitutional: Negative for chills and fever.   Gastrointestinal: Negative for nausea.       Objective:   Physical Exam  Vitals signs and nursing note reviewed.       BP Readings from Last 3 Encounters:   10/20/20 (!) 140/78   10/15/20 128/62   10/02/20 (!) 150/79     Wt Readings from Last 1 Encounters:   10/20/20 0930 61.7 kg (136 lb 0.4 oz)       Body mass index is 22.64 kg/m².    Lab Review:   Lab Results   Component Value Date    HGBA1C 9.3 (H) 09/28/2020     Lab Results   Component  Value Date    CHOL 150 07/26/2019    HDL 52 07/26/2019    LDLCALC 82.2 07/26/2019    TRIG 79 07/26/2019    CHOLHDL 34.7 07/26/2019     Lab Results   Component Value Date     10/02/2020    K 5.0 10/02/2020     10/02/2020    CO2 25 10/02/2020     (H) 10/02/2020    BUN 26 (H) 10/02/2020    CREATININE 1.0 10/02/2020    CALCIUM 9.5 10/02/2020    PROT 6.4 10/02/2020    ALBUMIN 3.0 (L) 10/02/2020    BILITOT 0.4 10/02/2020    ALKPHOS 69 10/02/2020    AST 19 10/02/2020    ALT 25 10/02/2020    ANIONGAP 11 10/02/2020    ESTGFRAFRICA >60.0 10/02/2020    EGFRNONAA 52.2 (A) 10/02/2020    TSH 0.637 09/28/2020         Assessment and Plan     Senile osteoporosis  --Patient with postmenopausal osteoporosis  --Risk factors include: age, post menopausal status, and fam hx of hip fracture  --Will continue Actonel 35 mg weekly  --Repeat BMD in 11/2021  --To continue vit d replacement  --Re start calcium citrate 600 mg daily      Diabetes mellitus, type II  --A1c above goal  --Still having prandial excursions  --Recommend to continue Levemir 10 units qhs  --Increase glimepiride to 4 mg daily  --Start Tradjenta 5 mg daily  --Mail logs to review in 2 weeks  --Advised her to let me know if glucoses trend down significantly or she has any hypoglycemic episodes  --Glucoses may improve somewhat as bronchitis improves    Hyperlipidemia  --On statin      RTC in 3 months with A1c prior to appt      Joshua Martines M.D. Staff Endocrinology

## 2020-10-20 NOTE — ASSESSMENT & PLAN NOTE
--A1c above goal  --Still having prandial excursions  --Recommend to continue Levemir 10 units qhs  --Increase glimepiride to 4 mg daily  --Start Tradjenta 5 mg daily  --Mail logs to review in 2 weeks  --Advised her to let me know if glucoses trend down significantly or she has any hypoglycemic episodes  --Glucoses may improve somewhat as bronchitis improves

## 2020-10-20 NOTE — ASSESSMENT & PLAN NOTE
--Patient with postmenopausal osteoporosis  --Risk factors include: age, post menopausal status, and fam hx of hip fracture  --Will continue Actonel 35 mg weekly  --Repeat BMD in 11/2021  --To continue vit d replacement  --Re start calcium citrate 600 mg daily

## 2020-10-20 NOTE — PATIENT INSTRUCTIONS
Continue Levemir 10 units at night    Increase glimepiride to 4 mg daily    Add Tradjenta 5 mg daily    Start calcium citrate over the counter at 600 mg daily

## 2020-11-16 ENCOUNTER — PATIENT MESSAGE (OUTPATIENT)
Dept: ENDOCRINOLOGY | Facility: CLINIC | Age: 85
End: 2020-11-16

## 2020-11-16 ENCOUNTER — TELEPHONE (OUTPATIENT)
Dept: ENDOCRINOLOGY | Facility: CLINIC | Age: 85
End: 2020-11-16

## 2020-11-16 NOTE — TELEPHONE ENCOUNTER
Please advise patient that I reviewed her glucose logs and read her letter regarding the Tradjenta. I would like her to decrease the Levemir to 8 units at night and continue the glimepiride 4 mg once daily. I do think she needs an additional medication. We could try Januvia which is in the same drug class a Tradjenta to see if it's covered, or try a low dose of metformin which is generic.

## 2020-11-16 NOTE — TELEPHONE ENCOUNTER
Tried to call voice mail box not set us is what the message said, going to leave a message in the patients portal.

## 2020-11-18 ENCOUNTER — OFFICE VISIT (OUTPATIENT)
Dept: INTERNAL MEDICINE | Facility: CLINIC | Age: 85
End: 2020-11-18
Payer: MEDICARE

## 2020-11-18 VITALS
RESPIRATION RATE: 16 BRPM | HEIGHT: 65 IN | HEART RATE: 80 BPM | SYSTOLIC BLOOD PRESSURE: 124 MMHG | WEIGHT: 136.25 LBS | DIASTOLIC BLOOD PRESSURE: 70 MMHG | TEMPERATURE: 98 F | BODY MASS INDEX: 22.7 KG/M2

## 2020-11-18 DIAGNOSIS — R60.0 BILATERAL LOWER EXTREMITY EDEMA: ICD-10-CM

## 2020-11-18 DIAGNOSIS — E11.59 HYPERTENSION ASSOCIATED WITH DIABETES: ICD-10-CM

## 2020-11-18 DIAGNOSIS — A41.9 SEPSIS, DUE TO UNSPECIFIED ORGANISM, UNSPECIFIED WHETHER ACUTE ORGAN DYSFUNCTION PRESENT: ICD-10-CM

## 2020-11-18 DIAGNOSIS — Z23 INFLUENZA VACCINE ADMINISTERED: ICD-10-CM

## 2020-11-18 DIAGNOSIS — E78.5 DYSLIPIDEMIA ASSOCIATED WITH TYPE 2 DIABETES MELLITUS: ICD-10-CM

## 2020-11-18 DIAGNOSIS — E11.29 TYPE 2 DIABETES MELLITUS WITH MICROALBUMINURIA, WITH LONG-TERM CURRENT USE OF INSULIN: ICD-10-CM

## 2020-11-18 DIAGNOSIS — E11.69 DYSLIPIDEMIA ASSOCIATED WITH TYPE 2 DIABETES MELLITUS: ICD-10-CM

## 2020-11-18 DIAGNOSIS — J18.9 PNEUMONIA DUE TO INFECTIOUS ORGANISM, UNSPECIFIED LATERALITY, UNSPECIFIED PART OF LUNG: Primary | ICD-10-CM

## 2020-11-18 DIAGNOSIS — I15.2 HYPERTENSION ASSOCIATED WITH DIABETES: ICD-10-CM

## 2020-11-18 DIAGNOSIS — Z23 PNEUMOCOCCAL VACCINE ADMINISTERED: ICD-10-CM

## 2020-11-18 DIAGNOSIS — Z79.4 TYPE 2 DIABETES MELLITUS WITH MICROALBUMINURIA, WITH LONG-TERM CURRENT USE OF INSULIN: ICD-10-CM

## 2020-11-18 DIAGNOSIS — R80.9 TYPE 2 DIABETES MELLITUS WITH MICROALBUMINURIA, WITH LONG-TERM CURRENT USE OF INSULIN: ICD-10-CM

## 2020-11-18 PROCEDURE — 99999 PR PBB SHADOW E&M-EST. PATIENT-LVL V: ICD-10-PCS | Mod: PBBFAC,GC,, | Performed by: STUDENT IN AN ORGANIZED HEALTH CARE EDUCATION/TRAINING PROGRAM

## 2020-11-18 PROCEDURE — 99215 OFFICE O/P EST HI 40 MIN: CPT | Mod: PBBFAC,PO,25 | Performed by: STUDENT IN AN ORGANIZED HEALTH CARE EDUCATION/TRAINING PROGRAM

## 2020-11-18 PROCEDURE — 90694 VACC AIIV4 NO PRSRV 0.5ML IM: CPT | Mod: PBBFAC,PO

## 2020-11-18 PROCEDURE — 99214 OFFICE O/P EST MOD 30 MIN: CPT | Mod: S$PBB,GC,, | Performed by: STUDENT IN AN ORGANIZED HEALTH CARE EDUCATION/TRAINING PROGRAM

## 2020-11-18 PROCEDURE — 99999 PR PBB SHADOW E&M-EST. PATIENT-LVL V: CPT | Mod: PBBFAC,GC,, | Performed by: STUDENT IN AN ORGANIZED HEALTH CARE EDUCATION/TRAINING PROGRAM

## 2020-11-18 PROCEDURE — G0009 ADMIN PNEUMOCOCCAL VACCINE: HCPCS | Mod: PBBFAC,PO

## 2020-11-18 PROCEDURE — G0008 ADMIN INFLUENZA VIRUS VAC: HCPCS | Mod: PBBFAC,PO

## 2020-11-18 PROCEDURE — 99214 PR OFFICE/OUTPT VISIT, EST, LEVL IV, 30-39 MIN: ICD-10-PCS | Mod: S$PBB,GC,, | Performed by: STUDENT IN AN ORGANIZED HEALTH CARE EDUCATION/TRAINING PROGRAM

## 2020-11-18 RX ORDER — CALCIUM CARBONATE 600 MG
TABLET ORAL ONCE
COMMUNITY
End: 2022-12-09 | Stop reason: ALTCHOICE

## 2020-11-18 RX ORDER — CARVEDILOL 6.25 MG/1
6.25 TABLET ORAL 2 TIMES DAILY WITH MEALS
Qty: 60 TABLET | Refills: 3 | Status: SHIPPED | OUTPATIENT
Start: 2020-11-18 | End: 2021-03-24 | Stop reason: SDUPTHER

## 2020-11-18 NOTE — PROGRESS NOTES
I have interviewed and examined the patient w/ the resident,   I agree w/ the impression and plan as outlined above.   Xuan Mathias MD- Staff

## 2020-11-18 NOTE — PROGRESS NOTES
Internal Medicine Clinic Note  11/18/20      Subjective:       Patient ID: Sussy Costa is a 85 y.o. female being seen for an established visit.    Chief Complaint: Follow-up (1 month), Leg Swelling (bilateral), and Nasal Congestion      HPI  Patient is an 85 year old female with HTN, HLD, DMII (HA1c 9.3, now on insulin), and asthma who presents to clinic for one month follow up after initial follow for hospital stay due to right middle lobe pneumonia. Briefly, patient was hospitalized form 9/28 to 10/02. She was treated with steroids and antibiotics for asthma exacerbation with right middle lobe pneumonia. She was in atrial for a short time but this resolved with treatment of sepsis. Patient was discharged with 2 days of doxycycline (which is now finished), was started on amaryl and 10 units levemir (which was changed to 8 units by endocrinology), and amlodipine 5 mg. Since discharge she reports improvement in her respiratory status. She no longer has a productive cough and is able to perform her ADL as she used to at baseline. She denies any chest pain, fevers, chills, nausea, or vomiting. Her primary concern is her bilateral lower extremity edema. She has had swelling in the past, but feels this is worse than usual and feels pain in both lower extremities. In regards to her diabetes, she has been using injectable insulin without difficulty. She checks her blood sugar, keeps a log, and sends this to endocrinology for management of her diabetes.      Past Medical History:   Diagnosis Date    Asthma     Cyst of pancreas     liver    Diabetes mellitus type II     Eczema of hand     Glaucoma     Hyperlipidemia     Hypertension     Lichen planus     gums    Osteoporosis     Pulmonary nodules        Past Surgical History:   Procedure Laterality Date    CATARACT EXTRACTION, BILATERAL      CHOLECYSTECTOMY      EPIDURAL STEROID INJECTION INTO LUMBAR SPINE N/A 11/8/2018    Procedure:  Injection-steroid-epidural-lumbar L5-S1;  Surgeon: Nicci Osorio Jr., MD;  Location: [facility] PAIN [facility]GT;  Service: Pain Management;  Laterality: N/A;    FUNCTIONAL ENDOSCOPIC SINUS SURGERY (FESS) USING COMPUTER-ASSISTED NAVIGATION N/A 6/17/2019    Procedure: FESS, USING COMPUTER-ASSISTED NAVIGATION;  Surgeon: Rosangela Isabel MD;  Location: [facility] OR;  Service: ENT;  Laterality: N/A;  video    HYSTERECTOMY      nasal polyps         Family History   Problem Relation Age of Onset    Heart disease Father     Heart disease Brother     Hypertension Brother        Social History     Socioeconomic History    Marital status:      Spouse name: sania    Number of children: 1    Years of education: Not on file    Highest education level: Not on file   Occupational History    Occupation:    Social Needs    Financial resource strain: Not hard at all    Food insecurity     Worry: Not on file     Inability: Not on file    Transportation needs     Medical: Not on file     Non-medical: Not on file   Tobacco Use    Smoking status: Never Smoker    Smokeless tobacco: Never Used   Substance and Sexual Activity    Alcohol use: Yes     Comment: socially    Drug use: No    Sexual activity: Yes     Partners: Male     Birth control/protection: Post-menopausal   Lifestyle    Physical activity     Days per week: 1 day     Minutes per session: 10 min    Stress: Only a little   Relationships    Social connections     Talks on phone: Three times a week     Gets together: Twice a week     Attends [employer] service: Not on file     Active member of club or organization: No     Attends meetings of clubs or organizations: Not on file     Relationship status:    Other Topics Concern    Are you pregnant or think you may be? Not Asked    Breast-feeding Not Asked   Social History Narrative    She does not exercise regularly.       Review of Systems   Constitutional: Negative for chills, fatigue and  fever.   HENT: Positive for congestion and rhinorrhea. Negative for sore throat.    Respiratory: Negative for cough, chest tightness, shortness of breath and wheezing.    Cardiovascular: Positive for leg swelling. Negative for chest pain and palpitations.   Gastrointestinal: Negative for abdominal pain, constipation, diarrhea and nausea.   Genitourinary: Negative for dysuria and flank pain.   Musculoskeletal: Negative for arthralgias, back pain and myalgias.   Skin: Negative for color change, pallor and rash.   Neurological: Negative for dizziness, weakness and headaches.       Patient's Medications   New Prescriptions    CARVEDILOL (COREG) 6.25 MG TABLET    Take 1 tablet (6.25 mg total) by mouth 2 (two) times daily with meals.   Previous Medications    ALBUTEROL (ACCUNEB) 1.25 MG/3 ML NEBU    Take 3 mLs (1.25 mg total) by nebulization every 6 (six) hours as needed. Rescue    ALBUTEROL (PROAIR HFA) 90 MCG/ACTUATION INHALER    Inhale 2 puffs into the lungs every 6 (six) hours as needed for Wheezing. Rescue    ATORVASTATIN (LIPITOR) 20 MG TABLET    TAKE 1 TABLET BY MOUTH DAILY IN THE EVENING FOR CHOLESTEROL    BLOOD SUGAR DIAGNOSTIC STRP    1 strip by Misc.(Non-Drug; Combo Route) route once daily.    BUDESONIDE 1 MG/NORMAL SALINE 50 ML NASAL IRRIGATION        CALCIUM CARBONATE (OS-JUNIE) 600 MG CALCIUM (1,500 MG) TAB    Take 600 mg by mouth once.    FLUTICASONE-SALMETEROL DISKUS INHALER 500-50 MCG    Inhale 1 puff into the lungs 2 (two) times daily.    GLIMEPIRIDE (AMARYL) 2 MG TABLET    TAKE TWO TABLETS BY MOUTH IN THE MORNING WITH BREAKFAST    INSULIN DETEMIR U-100 (LEVEMIR FLEXTOUCH) 100 UNIT/ML (3 ML) SUBQ INPN PEN    Inject 8 Units into the skin once daily.    IRBESARTAN (AVAPRO) 300 MG TABLET    Take 1 tablet (300 mg total) by mouth every evening.    LANCETS (MICROLET LANCET) MISC    Check blood sugar 2 times daily    LATANOPROST (XALATAN) 0.005 % OPHTHALMIC SOLUTION    Inject into the eye. 1 Drops Ophthalmic  "Every evening    MAGNESIUM OXIDE (MAG-OX) 400 MG (241.3 MG MAGNESIUM) TABLET    Take 400 mg by mouth once daily.    MONTELUKAST (SINGULAIR) 10 MG TABLET    Take 1 tablet (10 mg total) by mouth nightly.    OLOPATADINE (PATANOL) 0.1 % OPHTHALMIC SOLUTION    Place 1 drop into both eyes 2 (two) times daily as needed. 1 Drops Ophthalmic Twice a day     PEN NEEDLE, DIABETIC 31 GAUGE X 5/16" NDLE    Use with insulin pen    RISEDRONATE (ACTONEL) 35 MG TABLET    TAKE 1 TABLET BY MOUTH EVERY 7 DAYS    VITAMIN D (VITAMIN D3) 1000 UNITS TAB    Take 1,000 Units by mouth once daily.   Modified Medications    No medications on file   Discontinued Medications    AMLODIPINE (NORVASC) 5 MG TABLET    Take 1 tablet (5 mg total) by mouth once daily.    LEVOCETIRIZINE (XYZAL) 5 MG TABLET    Take 1 tablet (5 mg total) by mouth every evening.       Patient Active Problem List    Diagnosis Date Noted    Pneumonia of right middle lobe due to infectious organism 09/28/2020    New onset a-fib 09/28/2020    Other chronic sinusitis 06/17/2019    Chronic pain 11/08/2018    Anterolisthesis 10/05/2018    Lumbar spondylosis 10/05/2018    Spinal stenosis of lumbar region 08/30/2018    Lumbar radiculopathy 08/30/2018    Bilateral lower extremity edema 02/21/2018    Acute hypoxemic respiratory failure 12/29/2017    Pneumonia of left lower lobe due to infectious organism 12/27/2017    Sepsis 12/27/2017    Hypertension associated with diabetes 11/09/2017    Dyslipidemia associated with type 2 diabetes mellitus 11/09/2017    Senile osteoporosis 11/09/2017    Pancreas cyst 03/18/2016    Leg swelling 01/27/2015    Left knee pain 01/27/2015    Hip pain 07/10/2014    Asthma with exacerbation 04/22/2013     Class: Acute    OA (osteoarthritis) 11/26/2012    Hyperlipidemia     Glaucoma      Dx updated per 2019 IMO Load      Hypertension     HTN (hypertension) 09/24/2012    Diabetes mellitus, type II 09/24/2012           Objective:    " "  /70 (BP Location: Right arm, Patient Position: Sitting, BP Method: Medium (Manual))   Pulse 80   Temp 97.5 °F (36.4 °C) (Temporal)   Resp 16   Ht 5' 5" (1.651 m)   Wt 61.8 kg (136 lb 3.9 oz)   BMI 22.67 kg/m²   Estimated body mass index is 22.67 kg/m² as calculated from the following:    Height as of this encounter: 5' 5" (1.651 m).    Weight as of this encounter: 61.8 kg (136 lb 3.9 oz).    Physical Exam  Vitals signs reviewed.   Constitutional:       Appearance: Normal appearance. She is well-developed and normal weight. She is not toxic-appearing.   HENT:      Head: Normocephalic and atraumatic.      Right Ear: External ear normal.      Left Ear: External ear normal.      Nose: Rhinorrhea present.      Mouth/Throat:      Mouth: Mucous membranes are moist.      Pharynx: Oropharynx is clear. No oropharyngeal exudate.   Eyes:      General: No scleral icterus.     Extraocular Movements: Extraocular movements intact.   Neck:      Musculoskeletal: Normal range of motion and neck supple.   Cardiovascular:      Rate and Rhythm: Normal rate and regular rhythm.      Heart sounds: Normal heart sounds. No murmur.   Pulmonary:      Effort: Pulmonary effort is normal.      Breath sounds: Normal breath sounds. No wheezing or rales.   Abdominal:      General: Bowel sounds are normal.      Palpations: Abdomen is soft.      Tenderness: There is no abdominal tenderness.   Musculoskeletal: Normal range of motion.         General: No deformity.      Right lower leg: Edema present.      Left lower leg: Edema (worse than right) present.   Skin:     General: Skin is warm and dry.      Comments: Shiny skin on legs   Neurological:      Mental Status: She is alert and oriented to person, place, and time.   Psychiatric:         Behavior: Behavior normal.         Assessment:         1. Pneumonia due to infectious organism, unspecified laterality, unspecified part of lung     2. Sepsis, due to unspecified organism, unspecified " whether acute organ dysfunction present     3. Type 2 diabetes mellitus with microalbuminuria, with long-term current use of insulin     4. Hypertension associated with diabetes     5. Dyslipidemia associated with type 2 diabetes mellitus     6. Bilateral lower extremity edema     7. Influenza vaccine administered     8. Pneumococcal vaccine administered           Plan:         1. Pneumonia due to infectious organism, unspecified laterality, unspecified part of lung  - Patient treated with week long course of antibiotics and 5 days of steroids  - Not SOB, no productive cough, fevers or chills at time of exam  - resolved     2. Sepsis, due to unspecified organism, unspecified whether acute organ dysfunction present  - See problem 1    3. Type 2 diabetes mellitus with microalbuminuria, with long-term current use of insulin  - Patient started on insulin and amaryl in hospital; HA1c of 9.3  - Monitored and managed by endocrinology (Dr. Martines)    4. Hypertension associated with diabetes  - Patient on irbesartan and tolerating well. Started on amlodipine in hospital  - Will discontinue amlodipine as this is likely causing her bilateral lower extremity edema as it has done in the past  - Start coreg 6.25 BID, follow up in 1 month for blood pressure monitoring    5. Dyslipidemia associated with type 2 diabetes mellitus  - Continue home atorvastatin    6. Bilateral lower extremity edema  - See problem 4  - Patient has no evidence of heart failure on exam (no JVD or sacral edema), no evidence of HF on echo done 2 years ago. Unlikely to be DVT as patient had gotten enoxaparin inpatient and swelling is bilateral.     7. Influenza vaccine administered      8. Pneumococcal vaccine administered        Orders Placed This Encounter   Procedures    (In Office Administered) Pneumococcal Polysaccharide Vaccine (23 Valent) (SQ/IM)             Patient seen and plan of care discussed with Dr. Stew Polanco  Internal  Medicine, PGY-3  [phone]

## 2020-11-30 ENCOUNTER — TELEPHONE (OUTPATIENT)
Dept: INTERNAL MEDICINE | Facility: CLINIC | Age: 85
End: 2020-11-30

## 2020-11-30 RX ORDER — FUROSEMIDE 20 MG/1
20 TABLET ORAL DAILY
Qty: 10 TABLET | Refills: 0 | Status: SHIPPED | OUTPATIENT
Start: 2020-11-30 | End: 2020-12-23 | Stop reason: SDUPTHER

## 2020-11-30 NOTE — TELEPHONE ENCOUNTER
----- Message from Delia Taveras sent at 11/30/2020  2:16 PM CST -----  Contact: NINFA ORTEGA [[id number]][phone]  On patient last visit she came in for swelling in her legs. The doctor she saw discontinued one of her medications, Amlodipine 5 mg and levothyroxine 5mg stated that should help but it hasn't. It's been two weeks and they are really swelling. She want your advice. She was to start taking carvediloL (COREG) 6.25 MG tablet.          [facility]  @ [phone] (Phone) or  [phone] (Fax)

## 2020-11-30 NOTE — TELEPHONE ENCOUNTER
Only amlodipine was to be stopped.  Agree.  Will send in fluid pill--lasix 20mg--to take daily for 1 week and give us a status report in 1 week

## 2020-11-30 NOTE — TELEPHONE ENCOUNTER
Attempted to contact the pt with no answer and her voicemail box is not set up yet.    Unable to leave a message.

## 2020-12-01 ENCOUNTER — TELEPHONE (OUTPATIENT)
Dept: ENDOCRINOLOGY | Facility: CLINIC | Age: 85
End: 2020-12-01

## 2020-12-01 RX ORDER — METFORMIN HYDROCHLORIDE 500 MG/1
500 TABLET ORAL 2 TIMES DAILY WITH MEALS
Qty: 60 TABLET | Refills: 11 | Status: SHIPPED | OUTPATIENT
Start: 2020-12-01 | End: 2021-08-16

## 2020-12-01 NOTE — TELEPHONE ENCOUNTER
----- Message from Velma Rosas sent at 12/1/2020 10:26 AM CST -----  Contact: Pt @ [phone]  Pt is requesting to speak with nurse Barnes, states she received a letter and is requesting a call back     Call back Pt @ [phone]

## 2020-12-01 NOTE — TELEPHONE ENCOUNTER
Spoke to patient and she said she would like to start the low dose of metformin to be sent to her pharmacy

## 2020-12-08 RX ORDER — MONTELUKAST SODIUM 10 MG/1
10 TABLET ORAL NIGHTLY
Qty: 90 TABLET | Refills: 0 | Status: SHIPPED | OUTPATIENT
Start: 2020-12-08 | End: 2021-03-15 | Stop reason: SDUPTHER

## 2020-12-08 RX ORDER — ATORVASTATIN CALCIUM 20 MG/1
TABLET, FILM COATED ORAL
Qty: 90 TABLET | Refills: 0 | Status: SHIPPED | OUTPATIENT
Start: 2020-12-08 | End: 2021-03-08 | Stop reason: SDUPTHER

## 2020-12-23 ENCOUNTER — LAB VISIT (OUTPATIENT)
Dept: LAB | Facility: HOSPITAL | Age: 85
End: 2020-12-23
Attending: INTERNAL MEDICINE
Payer: MEDICARE

## 2020-12-23 ENCOUNTER — OFFICE VISIT (OUTPATIENT)
Dept: INTERNAL MEDICINE | Facility: CLINIC | Age: 85
End: 2020-12-23
Payer: MEDICARE

## 2020-12-23 VITALS
HEIGHT: 65 IN | SYSTOLIC BLOOD PRESSURE: 132 MMHG | DIASTOLIC BLOOD PRESSURE: 66 MMHG | RESPIRATION RATE: 14 BRPM | WEIGHT: 138.88 LBS | TEMPERATURE: 98 F | BODY MASS INDEX: 23.14 KG/M2 | HEART RATE: 58 BPM | OXYGEN SATURATION: 99 %

## 2020-12-23 DIAGNOSIS — Z79.4 TYPE 2 DIABETES MELLITUS WITH MICROALBUMINURIA, WITH LONG-TERM CURRENT USE OF INSULIN: ICD-10-CM

## 2020-12-23 DIAGNOSIS — R60.0 LOWER EXTREMITY EDEMA: Primary | ICD-10-CM

## 2020-12-23 DIAGNOSIS — R60.0 LOWER EXTREMITY EDEMA: ICD-10-CM

## 2020-12-23 DIAGNOSIS — R80.9 TYPE 2 DIABETES MELLITUS WITH MICROALBUMINURIA, WITH LONG-TERM CURRENT USE OF INSULIN: ICD-10-CM

## 2020-12-23 DIAGNOSIS — E11.29 TYPE 2 DIABETES MELLITUS WITH MICROALBUMINURIA, WITH LONG-TERM CURRENT USE OF INSULIN: ICD-10-CM

## 2020-12-23 LAB
ANION GAP SERPL CALC-SCNC: 9 MMOL/L (ref 8–16)
BUN SERPL-MCNC: 15 MG/DL (ref 8–23)
CALCIUM SERPL-MCNC: 9.5 MG/DL (ref 8.7–10.5)
CHLORIDE SERPL-SCNC: 106 MMOL/L (ref 95–110)
CO2 SERPL-SCNC: 24 MMOL/L (ref 23–29)
CREAT SERPL-MCNC: 0.9 MG/DL (ref 0.5–1.4)
EST. GFR  (AFRICAN AMERICAN): >60 ML/MIN/1.73 M^2
EST. GFR  (NON AFRICAN AMERICAN): 58.9 ML/MIN/1.73 M^2
GLUCOSE SERPL-MCNC: 230 MG/DL (ref 70–110)
POTASSIUM SERPL-SCNC: 4 MMOL/L (ref 3.5–5.1)
SODIUM SERPL-SCNC: 139 MMOL/L (ref 136–145)

## 2020-12-23 PROCEDURE — 99213 OFFICE O/P EST LOW 20 MIN: CPT | Mod: S$PBB,,, | Performed by: NURSE PRACTITIONER

## 2020-12-23 PROCEDURE — 36415 COLL VENOUS BLD VENIPUNCTURE: CPT | Mod: PO

## 2020-12-23 PROCEDURE — 99213 PR OFFICE/OUTPT VISIT, EST, LEVL III, 20-29 MIN: ICD-10-PCS | Mod: S$PBB,,, | Performed by: NURSE PRACTITIONER

## 2020-12-23 PROCEDURE — 99999 PR PBB SHADOW E&M-EST. PATIENT-LVL IV: CPT | Mod: PBBFAC,,, | Performed by: NURSE PRACTITIONER

## 2020-12-23 PROCEDURE — 99214 OFFICE O/P EST MOD 30 MIN: CPT | Mod: PBBFAC,PO | Performed by: NURSE PRACTITIONER

## 2020-12-23 PROCEDURE — 80048 BASIC METABOLIC PNL TOTAL CA: CPT

## 2020-12-23 PROCEDURE — 99999 PR PBB SHADOW E&M-EST. PATIENT-LVL IV: ICD-10-PCS | Mod: PBBFAC,,, | Performed by: NURSE PRACTITIONER

## 2020-12-23 RX ORDER — FUROSEMIDE 20 MG/1
20 TABLET ORAL DAILY
Qty: 7 TABLET | Refills: 0 | Status: SHIPPED | OUTPATIENT
Start: 2020-12-23 | End: 2021-06-10

## 2020-12-23 NOTE — PROGRESS NOTES
[facility] Note    Chief Complaint      Chief Complaint   Patient presents with    Leg Swelling     Both LE; non-pitting edema    Knee Pain     Right knee/edema     History of Present Illness      Sussy Costa is a 85 y.o. female patient of Dr. Arguello who is new to me and presents today for c/o bilateral lower extremity swelling L>R, right knee swelling, and left dorsal foot tenderness over the past few days. Pt was treated about a month ago for same c/o. Pt reports skin feels tight, some tenderness. No redness, warmth, drainage. Denies fever, worsening sob, or cp. Did feel that she had some wheezing this am.     Problem List Items Addressed This Visit        Endocrine    Diabetes mellitus, type II    Relevant Orders    Basic Metabolic Panel      Other Visit Diagnoses     Lower extremity edema    -  Primary    Relevant Medications    furosemide (LASIX) 20 MG tablet    Other Relevant Orders    Basic Metabolic Panel          Health Maintenance   Topic Date Due    Eye Exam  04/08/2020    Foot Exam  04/26/2020    Lipid Panel  07/26/2020    Hemoglobin A1c  03/28/2021    DEXA SCAN  11/14/2022    TETANUS VACCINE  12/26/2023    Pneumococcal Vaccine (65+ Low/Medium Risk)  Completed       Past Medical History:   Diagnosis Date    Asthma     Cyst of pancreas     liver    Diabetes mellitus type II     Eczema of hand     Glaucoma     Hyperlipidemia     Hypertension     Lichen planus     gums    Osteoporosis     Pulmonary nodules        Past Surgical History:   Procedure Laterality Date    CATARACT EXTRACTION, BILATERAL      CHOLECYSTECTOMY      EPIDURAL STEROID INJECTION INTO LUMBAR SPINE N/A 11/8/2018    Procedure: Injection-steroid-epidural-lumbar L5-S1;  Surgeon: Nicci Osorio Jr., MD;  Location: [facility];  Service: Pain Management;  Laterality: N/A;    FUNCTIONAL ENDOSCOPIC SINUS SURGERY (FESS) USING COMPUTER-ASSISTED NAVIGATION N/A 6/17/2019    Procedure: FESS, USING  COMPUTER-ASSISTED NAVIGATION;  Surgeon: Rosangela Isabel MD;  Location: [facility] OR;  Service: ENT;  Laterality: N/A;  video    HYSTERECTOMY      nasal polyps         family history includes Heart disease in her brother and father; Hypertension in her brother.    Social History     Tobacco Use    Smoking status: Never Smoker    Smokeless tobacco: Never Used   Substance Use Topics    Alcohol use: Yes     Comment: socially    Drug use: No       Review of Systems   Constitutional: Positive for malaise/fatigue. Negative for chills and fever.   Respiratory: Positive for shortness of breath. Negative for cough.    Cardiovascular: Positive for leg swelling.   Gastrointestinal: Negative for nausea.   Skin: Negative for rash.   Neurological: Negative for dizziness.        Outpatient Encounter Medications as of 12/23/2020   Medication Sig Note Dispense Refill    albuterol (PROAIR HFA) 90 mcg/actuation inhaler Inhale 2 puffs into the lungs every 6 (six) hours as needed for Wheezing. Rescue  1 Inhaler 2    atorvastatin (LIPITOR) 20 MG tablet TAKE 1 TABLET BY MOUTH DAILY IN THE EVENING FOR CHOLESTEROL  90 tablet 0    blood sugar diagnostic Strp 1 strip by Misc.(Non-Drug; Combo Route) route once daily.  100 strip 11    BUDESONIDE 1 MG/NORMAL SALINE 50 ML NASAL IRRIGATION        calcium carbonate (OS-JUNIE) 600 mg calcium (1,500 mg) Tab Take 600 mg by mouth once.       carvediloL (COREG) 6.25 MG tablet Take 1 tablet (6.25 mg total) by mouth 2 (two) times daily with meals.  60 tablet 3    fluticasone-salmeterol diskus inhaler 500-50 mcg Inhale 1 puff into the lungs 2 (two) times daily.  180 each 3    furosemide (LASIX) 20 MG tablet Take 1 tablet (20 mg total) by mouth once daily. for 7 days  7 tablet 0    glimepiride (AMARYL) 2 MG tablet TAKE TWO TABLETS BY MOUTH IN THE MORNING WITH BREAKFAST  180 tablet 3    insulin detemir U-100 (LEVEMIR FLEXTOUCH) 100 unit/mL (3 mL) SubQ InPn pen Inject 8 Units into the skin  "once daily.  3 mL 2    irbesartan (AVAPRO) 300 MG tablet Take 1 tablet (300 mg total) by mouth every evening.  90 tablet 3    lancets (MICROLET LANCET) Misc Check blood sugar 2 times daily  100 each 3    latanoprost (XALATAN) 0.005 % ophthalmic solution Inject into the eye. 1 Drops Ophthalmic Every evening 1/3/2018: Both eyes evening 1 gtt      magnesium oxide (MAG-OX) 400 mg (241.3 mg magnesium) tablet Take 400 mg by mouth once daily.       metFORMIN (GLUCOPHAGE) 500 MG tablet Take 1 tablet (500 mg total) by mouth 2 (two) times daily with meals.  60 tablet 11    montelukast (SINGULAIR) 10 mg tablet Take 1 tablet (10 mg total) by mouth nightly.  90 tablet 0    olopatadine (PATANOL) 0.1 % ophthalmic solution Place 1 drop into both eyes 2 (two) times daily as needed. 1 Drops Ophthalmic Twice a day        pen needle, diabetic 31 gauge x 5/16" Ndle Use with insulin pen  100 each 11    risedronate (ACTONEL) 35 MG tablet TAKE 1 TABLET BY MOUTH EVERY 7 DAYS  12 tablet 3    vitamin D (VITAMIN D3) 1000 units Tab Take 1,000 Units by mouth once daily.       [DISCONTINUED] furosemide (LASIX) 20 MG tablet Take 1 tablet (20 mg total) by mouth once daily.  10 tablet 0    albuterol (ACCUNEB) 1.25 mg/3 mL Nebu Take 3 mLs (1.25 mg total) by nebulization every 6 (six) hours as needed. Rescue (Patient not taking: Reported on 11/18/2020)  1 Box 0     No facility-administered encounter medications on file as of 12/23/2020.         Review of patient's allergies indicates:   Allergen Reactions    Aspirin Other (See Comments)     Asthma    TRIAD OF NASAL POLYPS, ASA  ALLERGY AND ASTHMA.        Aspirin Other (See Comments)    Nsaids (non-steroidal anti-inflammatory drug) Other (See Comments)     AVOID DUE TO TRIAD OF ASA ALLERGY, NASAL POLYPS,AND ASTHMA    Penicillin      Other reaction(s): Rash    9/30/20: tolerates ceftriaxone       Physical Exam      Vital Signs  Temp: 97.7 °F (36.5 °C)  Temp src: Temporal  Pulse: (!) " "58  Resp: 14  SpO2: 99 %  BP: 132/66  BP Location: Left arm  Patient Position: Sitting  Pain Score: 0-No pain  Height and Weight  Height: 5' 5" (165.1 cm)  Weight: 63 kg (138 lb 14.2 oz)  BSA (Calculated - sq m): 1.7 sq meters  BMI (Calculated): 23.1  Weight in (lb) to have BMI = 25: 149.9]    Physical Exam  Vitals signs and nursing note reviewed.   Constitutional:       Appearance: Normal appearance. She is well-developed.   HENT:      Head: Normocephalic and atraumatic.      Right Ear: External ear normal.      Left Ear: External ear normal.   Eyes:      Conjunctiva/sclera: Conjunctivae normal.   Neck:      Musculoskeletal: Normal range of motion and neck supple.      Thyroid: No thyromegaly.      Vascular: No JVD.      Trachea: No tracheal deviation.   Cardiovascular:      Rate and Rhythm: Normal rate and regular rhythm.      Heart sounds: Normal heart sounds.   Pulmonary:      Effort: Pulmonary effort is normal.      Breath sounds: Normal breath sounds.   Musculoskeletal: Normal range of motion.   Lymphadenopathy:      Cervical: No cervical adenopathy.   Skin:     General: Skin is warm and dry.      Findings: No erythema.      Comments: 1-2+ swelling to bilateral lower extremities from knees to ankles   Neurological:      Mental Status: She is alert and oriented to person, place, and time.   Psychiatric:         Behavior: Behavior normal.         Thought Content: Thought content normal.         Judgment: Judgment normal.          Laboratory:  CBC:  Recent Labs   Lab Result Units 09/30/20  0406 10/01/20  0332 10/02/20  0357   WBC K/uL 20.18* 19.19* 18.62*   RBC M/uL 4.75 4.41 4.64   Hemoglobin g/dL 14.0 13.1 13.9   Hematocrit % 44.4 41.7 43.9   Platelets K/uL 353* 325 356*   MCV fL 94 95 95   MCH pg 29.5 29.7 30.0   MCHC g/dL 31.5* 31.4* 31.7*     CMP:  Recent Labs   Lab Result Units 09/30/20  0406  10/01/20  0332 10/02/20  0357   Glucose mg/dL 196*   < > 200* 306*   Calcium mg/dL 9.9   < > 9.5 9.5   Albumin g/dL " 3.0*  --  2.9* 3.0*   Total Protein g/dL 6.6  --  6.2 6.4   Sodium mmol/L 142   < > 141 141   Potassium mmol/L 4.9   < > 4.6 5.0   CO2 mmol/L 26   < > 26 25   Chloride mmol/L 106   < > 105 105   BUN mg/dL 27*   < > 33* 26*   Alkaline Phosphatase U/L 78  --  63 69   ALT U/L 18  --  16 25   AST U/L 12  --  10 19   Total Bilirubin mg/dL 0.5  --  0.4 0.4    < > = values in this interval not displayed.     URINALYSIS:  No results for input(s): COLORU, CLARITYU, SPECGRAV, PHUR, PROTEINUA, GLUCOSEU, BILIRUBINCON, BLOODU, WBCU, RBCU, BACTERIA, MUCUS, NITRITE, LEUKOCYTESUR, UROBILINOGEN, HYALINECASTS in the last 2160 hours.   LIPIDS:  Recent Labs   Lab Result Units 09/28/20  1549   TSH uIU/mL 0.637     TSH:  Recent Labs   Lab Result Units 09/28/20  1549   TSH uIU/mL 0.637     A1C:  Recent Labs   Lab Result Units 09/28/20  1549   Hemoglobin A1C % 9.3*         Assessment/Plan     Sussy Costa is a 85 y.o.female with:    Encounter Diagnoses   Name Primary?    Lower extremity edema Yes    Type 2 diabetes mellitus with microalbuminuria, with long-term current use of insulin         PLAN  Stay hydrated with water, low sodium in diet, elevate lower extremities, wear support stockings/socks during day.   Pt will have podiatry eval left dorsal foot at appt  Bmp ordered      -Continue current medications and maintain follow up with specialists.  Return to clinic as needed for any cocnerns       Erin Jiminez, NP-C [facility]

## 2020-12-26 ENCOUNTER — PATIENT MESSAGE (OUTPATIENT)
Dept: INTERNAL MEDICINE | Facility: CLINIC | Age: 85
End: 2020-12-26

## 2020-12-26 RX ORDER — POTASSIUM CHLORIDE 750 MG/1
TABLET, EXTENDED RELEASE ORAL
Qty: 4 TABLET | Refills: 0 | Status: SHIPPED | OUTPATIENT
Start: 2020-12-26 | End: 2021-05-25

## 2020-12-26 NOTE — TELEPHONE ENCOUNTER
Electrolytes in normal limits  Potassium level and normal limits  However blood sugars running?  Will send and a few days of potassium to take with remaining lasix

## 2020-12-29 ENCOUNTER — PATIENT OUTREACH (OUTPATIENT)
Dept: ADMINISTRATIVE | Facility: OTHER | Age: 85
End: 2020-12-29

## 2020-12-29 ENCOUNTER — PATIENT MESSAGE (OUTPATIENT)
Dept: INTERNAL MEDICINE | Facility: CLINIC | Age: 85
End: 2020-12-29

## 2021-01-14 ENCOUNTER — LAB VISIT (OUTPATIENT)
Dept: LAB | Facility: HOSPITAL | Age: 86
End: 2021-01-14
Attending: INTERNAL MEDICINE
Payer: MEDICARE

## 2021-01-14 DIAGNOSIS — R80.9 TYPE 2 DIABETES MELLITUS WITH MICROALBUMINURIA, WITHOUT LONG-TERM CURRENT USE OF INSULIN: ICD-10-CM

## 2021-01-14 DIAGNOSIS — E11.29 TYPE 2 DIABETES MELLITUS WITH MICROALBUMINURIA, WITHOUT LONG-TERM CURRENT USE OF INSULIN: ICD-10-CM

## 2021-01-14 LAB
ESTIMATED AVG GLUCOSE: 169 MG/DL (ref 68–131)
HBA1C MFR BLD HPLC: 7.5 % (ref 4–5.6)

## 2021-01-14 PROCEDURE — 36415 COLL VENOUS BLD VENIPUNCTURE: CPT

## 2021-01-14 PROCEDURE — 83036 HEMOGLOBIN GLYCOSYLATED A1C: CPT

## 2021-01-20 ENCOUNTER — PATIENT MESSAGE (OUTPATIENT)
Dept: ENDOCRINOLOGY | Facility: CLINIC | Age: 86
End: 2021-01-20

## 2021-01-21 ENCOUNTER — OFFICE VISIT (OUTPATIENT)
Dept: ENDOCRINOLOGY | Facility: CLINIC | Age: 86
End: 2021-01-21
Payer: MEDICARE

## 2021-01-21 VITALS
BODY MASS INDEX: 21.34 KG/M2 | SYSTOLIC BLOOD PRESSURE: 116 MMHG | HEIGHT: 65 IN | DIASTOLIC BLOOD PRESSURE: 72 MMHG | WEIGHT: 128.06 LBS

## 2021-01-21 DIAGNOSIS — E78.5 HYPERLIPIDEMIA, UNSPECIFIED HYPERLIPIDEMIA TYPE: ICD-10-CM

## 2021-01-21 DIAGNOSIS — I10 HYPERTENSION, UNSPECIFIED TYPE: ICD-10-CM

## 2021-01-21 DIAGNOSIS — M81.0 SENILE OSTEOPOROSIS: Primary | ICD-10-CM

## 2021-01-21 DIAGNOSIS — E11.29 TYPE 2 DIABETES MELLITUS WITH MICROALBUMINURIA, WITHOUT LONG-TERM CURRENT USE OF INSULIN: ICD-10-CM

## 2021-01-21 DIAGNOSIS — R80.9 TYPE 2 DIABETES MELLITUS WITH MICROALBUMINURIA, WITHOUT LONG-TERM CURRENT USE OF INSULIN: ICD-10-CM

## 2021-01-21 PROCEDURE — 99214 OFFICE O/P EST MOD 30 MIN: CPT | Mod: S$PBB,,, | Performed by: INTERNAL MEDICINE

## 2021-01-21 PROCEDURE — 99999 PR PBB SHADOW E&M-EST. PATIENT-LVL IV: CPT | Mod: PBBFAC,,, | Performed by: INTERNAL MEDICINE

## 2021-01-21 PROCEDURE — 99999 PR PBB SHADOW E&M-EST. PATIENT-LVL IV: ICD-10-PCS | Mod: PBBFAC,,, | Performed by: INTERNAL MEDICINE

## 2021-01-21 PROCEDURE — 99214 OFFICE O/P EST MOD 30 MIN: CPT | Mod: PBBFAC | Performed by: INTERNAL MEDICINE

## 2021-01-21 PROCEDURE — 99214 PR OFFICE/OUTPT VISIT, EST, LEVL IV, 30-39 MIN: ICD-10-PCS | Mod: S$PBB,,, | Performed by: INTERNAL MEDICINE

## 2021-01-22 ENCOUNTER — PATIENT MESSAGE (OUTPATIENT)
Dept: ADMINISTRATIVE | Facility: OTHER | Age: 86
End: 2021-01-22

## 2021-02-04 ENCOUNTER — PATIENT MESSAGE (OUTPATIENT)
Dept: OTOLARYNGOLOGY | Facility: CLINIC | Age: 86
End: 2021-02-04

## 2021-02-05 DIAGNOSIS — Z01.818 PRE-PROCEDURAL EXAMINATION: ICD-10-CM

## 2021-02-09 ENCOUNTER — LAB VISIT (OUTPATIENT)
Dept: INTERNAL MEDICINE | Facility: CLINIC | Age: 86
End: 2021-02-09
Payer: MEDICARE

## 2021-02-09 DIAGNOSIS — Z01.818 PRE-PROCEDURAL EXAMINATION: ICD-10-CM

## 2021-02-09 PROCEDURE — U0005 INFEC AGEN DETEC AMPLI PROBE: HCPCS

## 2021-02-09 PROCEDURE — U0003 INFECTIOUS AGENT DETECTION BY NUCLEIC ACID (DNA OR RNA); SEVERE ACUTE RESPIRATORY SYNDROME CORONAVIRUS 2 (SARS-COV-2) (CORONAVIRUS DISEASE [COVID-19]), AMPLIFIED PROBE TECHNIQUE, MAKING USE OF HIGH THROUGHPUT TECHNOLOGIES AS DESCRIBED BY CMS-2020-01-R: HCPCS

## 2021-02-10 ENCOUNTER — PATIENT OUTREACH (OUTPATIENT)
Dept: ADMINISTRATIVE | Facility: OTHER | Age: 86
End: 2021-02-10

## 2021-02-10 RX ORDER — FLUTICASONE PROPIONATE AND SALMETEROL 500; 50 UG/1; UG/1
POWDER RESPIRATORY (INHALATION)
Qty: 180 EACH | Refills: 3 | Status: SHIPPED | OUTPATIENT
Start: 2021-02-10 | End: 2022-06-08 | Stop reason: SDUPTHER

## 2021-02-11 LAB — SARS-COV-2 RNA RESP QL NAA+PROBE: NOT DETECTED

## 2021-02-12 ENCOUNTER — OFFICE VISIT (OUTPATIENT)
Dept: OTOLARYNGOLOGY | Facility: CLINIC | Age: 86
End: 2021-02-12
Payer: MEDICARE

## 2021-02-12 VITALS
DIASTOLIC BLOOD PRESSURE: 65 MMHG | HEART RATE: 71 BPM | BODY MASS INDEX: 21.6 KG/M2 | HEIGHT: 65 IN | SYSTOLIC BLOOD PRESSURE: 138 MMHG | WEIGHT: 129.63 LBS

## 2021-02-12 DIAGNOSIS — J32.8 OTHER CHRONIC SINUSITIS: Primary | Chronic | ICD-10-CM

## 2021-02-12 DIAGNOSIS — J30.89 NON-SEASONAL ALLERGIC RHINITIS, UNSPECIFIED TRIGGER: Chronic | ICD-10-CM

## 2021-02-12 DIAGNOSIS — J33.8 POLYP OF PARANASAL SINUS: ICD-10-CM

## 2021-02-12 PROCEDURE — 99213 PR OFFICE/OUTPT VISIT, EST, LEVL III, 20-29 MIN: ICD-10-PCS | Mod: 25,S$PBB,, | Performed by: OTOLARYNGOLOGY

## 2021-02-12 PROCEDURE — 99213 OFFICE O/P EST LOW 20 MIN: CPT | Mod: 25,S$PBB,, | Performed by: OTOLARYNGOLOGY

## 2021-02-12 PROCEDURE — 99999 PR PBB SHADOW E&M-EST. PATIENT-LVL IV: ICD-10-PCS | Mod: PBBFAC,,, | Performed by: OTOLARYNGOLOGY

## 2021-02-12 PROCEDURE — 99999 PR PBB SHADOW E&M-EST. PATIENT-LVL IV: CPT | Mod: PBBFAC,,, | Performed by: OTOLARYNGOLOGY

## 2021-02-12 PROCEDURE — 31231 NASAL ENDOSCOPY DX: CPT | Mod: PBBFAC,PN | Performed by: OTOLARYNGOLOGY

## 2021-02-12 PROCEDURE — 31231 NASAL ENDOSCOPY DX: CPT | Mod: S$PBB,,, | Performed by: OTOLARYNGOLOGY

## 2021-02-12 PROCEDURE — 99214 OFFICE O/P EST MOD 30 MIN: CPT | Mod: PBBFAC,PN,25 | Performed by: OTOLARYNGOLOGY

## 2021-02-12 PROCEDURE — 31231 PR NASAL ENDOSCOPY, DX: ICD-10-PCS | Mod: S$PBB,,, | Performed by: OTOLARYNGOLOGY

## 2021-02-24 ENCOUNTER — OFFICE VISIT (OUTPATIENT)
Dept: PODIATRY | Facility: CLINIC | Age: 86
End: 2021-02-24
Payer: MEDICARE

## 2021-02-24 VITALS
SYSTOLIC BLOOD PRESSURE: 170 MMHG | WEIGHT: 131.81 LBS | DIASTOLIC BLOOD PRESSURE: 74 MMHG | BODY MASS INDEX: 21.94 KG/M2 | HEART RATE: 70 BPM

## 2021-02-24 DIAGNOSIS — L84 CORN OR CALLUS: ICD-10-CM

## 2021-02-24 DIAGNOSIS — E11.49 TYPE II DIABETES MELLITUS WITH NEUROLOGICAL MANIFESTATIONS: Primary | ICD-10-CM

## 2021-02-24 DIAGNOSIS — B35.1 ONYCHOMYCOSIS DUE TO DERMATOPHYTE: ICD-10-CM

## 2021-02-24 PROCEDURE — 11721 DEBRIDE NAIL 6 OR MORE: CPT | Mod: 59,Q9,S$PBB, | Performed by: PODIATRIST

## 2021-02-24 PROCEDURE — 11721 PR DEBRIDEMENT OF NAILS, 6 OR MORE: ICD-10-PCS | Mod: 59,Q9,S$PBB, | Performed by: PODIATRIST

## 2021-02-24 PROCEDURE — 99999 PR PBB SHADOW E&M-EST. PATIENT-LVL IV: CPT | Mod: PBBFAC,,, | Performed by: PODIATRIST

## 2021-02-24 PROCEDURE — 99213 OFFICE O/P EST LOW 20 MIN: CPT | Mod: 25,S$PBB,, | Performed by: PODIATRIST

## 2021-02-24 PROCEDURE — 99214 OFFICE O/P EST MOD 30 MIN: CPT | Mod: PBBFAC,25 | Performed by: PODIATRIST

## 2021-02-24 PROCEDURE — 11056 PR TRIM BENIGN HYPERKERATOTIC SKIN LESION,2-4: ICD-10-PCS | Mod: Q9,S$PBB,, | Performed by: PODIATRIST

## 2021-02-24 PROCEDURE — 11056 PARNG/CUTG B9 HYPRKR LES 2-4: CPT | Mod: Q9,S$PBB,, | Performed by: PODIATRIST

## 2021-02-24 PROCEDURE — 11056 PARNG/CUTG B9 HYPRKR LES 2-4: CPT | Mod: PBBFAC | Performed by: PODIATRIST

## 2021-02-24 PROCEDURE — 11721 DEBRIDE NAIL 6 OR MORE: CPT | Mod: 59,Q9,PBBFAC | Performed by: PODIATRIST

## 2021-02-24 PROCEDURE — 99999 PR PBB SHADOW E&M-EST. PATIENT-LVL IV: ICD-10-PCS | Mod: PBBFAC,,, | Performed by: PODIATRIST

## 2021-02-24 PROCEDURE — 99213 PR OFFICE/OUTPT VISIT, EST, LEVL III, 20-29 MIN: ICD-10-PCS | Mod: 25,S$PBB,, | Performed by: PODIATRIST

## 2021-02-24 RX ORDER — KETOCONAZOLE 20 MG/G
CREAM TOPICAL DAILY
Qty: 1 TUBE | Refills: 2 | Status: SHIPPED | OUTPATIENT
Start: 2021-02-24 | End: 2021-08-15 | Stop reason: ALTCHOICE

## 2021-03-15 RX ORDER — MONTELUKAST SODIUM 10 MG/1
10 TABLET ORAL NIGHTLY
Qty: 90 TABLET | Refills: 0 | Status: SHIPPED | OUTPATIENT
Start: 2021-03-15 | End: 2021-06-09 | Stop reason: SDUPTHER

## 2021-03-18 ENCOUNTER — TELEPHONE (OUTPATIENT)
Dept: ENDOCRINOLOGY | Facility: CLINIC | Age: 86
End: 2021-03-18

## 2021-03-18 RX ORDER — PEN NEEDLE, DIABETIC 30 GX3/16"
NEEDLE, DISPOSABLE MISCELLANEOUS
Qty: 100 EACH | Refills: 11 | Status: SHIPPED | OUTPATIENT
Start: 2021-03-18 | End: 2021-08-16 | Stop reason: SDUPTHER

## 2021-03-18 RX ORDER — INSULIN DETEMIR 100 [IU]/ML
6 INJECTION, SOLUTION SUBCUTANEOUS NIGHTLY
Qty: 3 ML | Refills: 11 | Status: SHIPPED | OUTPATIENT
Start: 2021-03-18 | End: 2021-05-25

## 2021-03-24 RX ORDER — CARVEDILOL 6.25 MG/1
6.25 TABLET ORAL 2 TIMES DAILY WITH MEALS
Qty: 60 TABLET | Refills: 3 | Status: SHIPPED | OUTPATIENT
Start: 2021-03-24 | End: 2021-07-26

## 2021-05-14 ENCOUNTER — PES CALL (OUTPATIENT)
Dept: ADMINISTRATIVE | Facility: CLINIC | Age: 86
End: 2021-05-14

## 2021-05-17 ENCOUNTER — TELEPHONE (OUTPATIENT)
Dept: OTOLARYNGOLOGY | Facility: CLINIC | Age: 86
End: 2021-05-17

## 2021-05-18 ENCOUNTER — LAB VISIT (OUTPATIENT)
Dept: LAB | Facility: HOSPITAL | Age: 86
End: 2021-05-18
Attending: INTERNAL MEDICINE
Payer: MEDICARE

## 2021-05-18 DIAGNOSIS — E11.29 TYPE 2 DIABETES MELLITUS WITH MICROALBUMINURIA, WITHOUT LONG-TERM CURRENT USE OF INSULIN: ICD-10-CM

## 2021-05-18 DIAGNOSIS — M81.0 SENILE OSTEOPOROSIS: ICD-10-CM

## 2021-05-18 DIAGNOSIS — R80.9 TYPE 2 DIABETES MELLITUS WITH MICROALBUMINURIA, WITHOUT LONG-TERM CURRENT USE OF INSULIN: ICD-10-CM

## 2021-05-18 LAB
25(OH)D3+25(OH)D2 SERPL-MCNC: 53 NG/ML (ref 30–96)
ESTIMATED AVG GLUCOSE: 192 MG/DL (ref 68–131)
HBA1C MFR BLD: 8.3 % (ref 4–5.6)

## 2021-05-18 PROCEDURE — 82306 VITAMIN D 25 HYDROXY: CPT | Performed by: INTERNAL MEDICINE

## 2021-05-18 PROCEDURE — 36415 COLL VENOUS BLD VENIPUNCTURE: CPT | Performed by: INTERNAL MEDICINE

## 2021-05-18 PROCEDURE — 83036 HEMOGLOBIN GLYCOSYLATED A1C: CPT | Performed by: INTERNAL MEDICINE

## 2021-05-24 ENCOUNTER — PATIENT MESSAGE (OUTPATIENT)
Dept: ENDOCRINOLOGY | Facility: CLINIC | Age: 86
End: 2021-05-24

## 2021-05-25 ENCOUNTER — OFFICE VISIT (OUTPATIENT)
Dept: ENDOCRINOLOGY | Facility: CLINIC | Age: 86
End: 2021-05-25
Payer: MEDICARE

## 2021-05-25 VITALS
BODY MASS INDEX: 20.33 KG/M2 | WEIGHT: 122 LBS | DIASTOLIC BLOOD PRESSURE: 74 MMHG | HEIGHT: 65 IN | SYSTOLIC BLOOD PRESSURE: 118 MMHG

## 2021-05-25 DIAGNOSIS — M81.0 SENILE OSTEOPOROSIS: Primary | ICD-10-CM

## 2021-05-25 DIAGNOSIS — E11.29 TYPE 2 DIABETES MELLITUS WITH MICROALBUMINURIA, WITHOUT LONG-TERM CURRENT USE OF INSULIN: ICD-10-CM

## 2021-05-25 DIAGNOSIS — E78.5 HYPERLIPIDEMIA, UNSPECIFIED HYPERLIPIDEMIA TYPE: ICD-10-CM

## 2021-05-25 DIAGNOSIS — R80.9 TYPE 2 DIABETES MELLITUS WITH MICROALBUMINURIA, WITHOUT LONG-TERM CURRENT USE OF INSULIN: ICD-10-CM

## 2021-05-25 PROCEDURE — 99999 PR PBB SHADOW E&M-EST. PATIENT-LVL V: ICD-10-PCS | Mod: PBBFAC,,, | Performed by: INTERNAL MEDICINE

## 2021-05-25 PROCEDURE — 99214 PR OFFICE/OUTPT VISIT, EST, LEVL IV, 30-39 MIN: ICD-10-PCS | Mod: S$PBB,,, | Performed by: INTERNAL MEDICINE

## 2021-05-25 PROCEDURE — 99214 OFFICE O/P EST MOD 30 MIN: CPT | Mod: S$PBB,,, | Performed by: INTERNAL MEDICINE

## 2021-05-25 PROCEDURE — 99999 PR PBB SHADOW E&M-EST. PATIENT-LVL V: CPT | Mod: PBBFAC,,, | Performed by: INTERNAL MEDICINE

## 2021-05-25 PROCEDURE — 99215 OFFICE O/P EST HI 40 MIN: CPT | Mod: PBBFAC | Performed by: INTERNAL MEDICINE

## 2021-05-25 RX ORDER — RISEDRONATE SODIUM 35 MG/1
35 TABLET, FILM COATED ORAL
Qty: 12 TABLET | Refills: 3 | Status: SHIPPED | OUTPATIENT
Start: 2021-05-25 | End: 2021-08-26 | Stop reason: SDUPTHER

## 2021-05-25 RX ORDER — NATEGLINIDE 60 MG/1
60 TABLET ORAL
Qty: 270 TABLET | Refills: 3 | Status: SHIPPED | OUTPATIENT
Start: 2021-05-25 | End: 2021-08-16

## 2021-05-25 RX ORDER — INSULIN DETEMIR 100 [IU]/ML
4 INJECTION, SOLUTION SUBCUTANEOUS NIGHTLY
Qty: 3 ML | Refills: 11 | Status: SHIPPED | OUTPATIENT
Start: 2021-05-25 | End: 2021-06-10 | Stop reason: SDUPTHER

## 2021-05-28 ENCOUNTER — OFFICE VISIT (OUTPATIENT)
Dept: INTERNAL MEDICINE | Facility: CLINIC | Age: 86
End: 2021-05-28
Payer: MEDICARE

## 2021-05-28 ENCOUNTER — LAB VISIT (OUTPATIENT)
Dept: LAB | Facility: HOSPITAL | Age: 86
End: 2021-05-28
Attending: INTERNAL MEDICINE
Payer: MEDICARE

## 2021-05-28 VITALS
RESPIRATION RATE: 12 BRPM | WEIGHT: 124.13 LBS | BODY MASS INDEX: 20.68 KG/M2 | OXYGEN SATURATION: 99 % | SYSTOLIC BLOOD PRESSURE: 152 MMHG | TEMPERATURE: 98 F | HEIGHT: 65 IN | DIASTOLIC BLOOD PRESSURE: 82 MMHG | HEART RATE: 68 BPM

## 2021-05-28 DIAGNOSIS — R06.02 SOB (SHORTNESS OF BREATH) ON EXERTION: ICD-10-CM

## 2021-05-28 DIAGNOSIS — R53.83 OTHER FATIGUE: ICD-10-CM

## 2021-05-28 DIAGNOSIS — I15.2 HYPERTENSION ASSOCIATED WITH DIABETES: ICD-10-CM

## 2021-05-28 DIAGNOSIS — J45.30 MILD PERSISTENT ASTHMA, UNSPECIFIED WHETHER COMPLICATED: ICD-10-CM

## 2021-05-28 DIAGNOSIS — E11.59 HYPERTENSION ASSOCIATED WITH DIABETES: Primary | ICD-10-CM

## 2021-05-28 DIAGNOSIS — I15.2 HYPERTENSION ASSOCIATED WITH DIABETES: Primary | ICD-10-CM

## 2021-05-28 DIAGNOSIS — E11.59 HYPERTENSION ASSOCIATED WITH DIABETES: ICD-10-CM

## 2021-05-28 LAB
ALBUMIN SERPL BCP-MCNC: 3.1 G/DL (ref 3.5–5.2)
ALP SERPL-CCNC: 56 U/L (ref 55–135)
ALT SERPL W/O P-5'-P-CCNC: 40 U/L (ref 10–44)
ANION GAP SERPL CALC-SCNC: 8 MMOL/L (ref 8–16)
AST SERPL-CCNC: 40 U/L (ref 10–40)
BASOPHILS # BLD AUTO: 0.09 K/UL (ref 0–0.2)
BASOPHILS NFR BLD: 0.9 % (ref 0–1.9)
BILIRUB SERPL-MCNC: 0.5 MG/DL (ref 0.1–1)
BNP SERPL-MCNC: 259 PG/ML (ref 0–99)
BUN SERPL-MCNC: 17 MG/DL (ref 8–23)
CALCIUM SERPL-MCNC: 9.3 MG/DL (ref 8.7–10.5)
CHLORIDE SERPL-SCNC: 110 MMOL/L (ref 95–110)
CO2 SERPL-SCNC: 25 MMOL/L (ref 23–29)
CREAT SERPL-MCNC: 0.8 MG/DL (ref 0.5–1.4)
DIFFERENTIAL METHOD: ABNORMAL
EOSINOPHIL # BLD AUTO: 0.9 K/UL (ref 0–0.5)
EOSINOPHIL NFR BLD: 8.9 % (ref 0–8)
ERYTHROCYTE [DISTWIDTH] IN BLOOD BY AUTOMATED COUNT: 14.2 % (ref 11.5–14.5)
EST. GFR  (AFRICAN AMERICAN): >60 ML/MIN/1.73 M^2
EST. GFR  (NON AFRICAN AMERICAN): >60 ML/MIN/1.73 M^2
GLUCOSE SERPL-MCNC: 209 MG/DL (ref 70–110)
HCT VFR BLD AUTO: 33.8 % (ref 37–48.5)
HGB BLD-MCNC: 11.1 G/DL (ref 12–16)
IMM GRANULOCYTES # BLD AUTO: 0.03 K/UL (ref 0–0.04)
IMM GRANULOCYTES NFR BLD AUTO: 0.3 % (ref 0–0.5)
LYMPHOCYTES # BLD AUTO: 2.3 K/UL (ref 1–4.8)
LYMPHOCYTES NFR BLD: 22.5 % (ref 18–48)
MCH RBC QN AUTO: 31.3 PG (ref 27–31)
MCHC RBC AUTO-ENTMCNC: 32.8 G/DL (ref 32–36)
MCV RBC AUTO: 95 FL (ref 82–98)
MONOCYTES # BLD AUTO: 0.8 K/UL (ref 0.3–1)
MONOCYTES NFR BLD: 8.1 % (ref 4–15)
NEUTROPHILS # BLD AUTO: 6 K/UL (ref 1.8–7.7)
NEUTROPHILS NFR BLD: 59.3 % (ref 38–73)
NRBC BLD-RTO: 0 /100 WBC
PLATELET # BLD AUTO: 224 K/UL (ref 150–450)
PMV BLD AUTO: 12.6 FL (ref 9.2–12.9)
POTASSIUM SERPL-SCNC: 4 MMOL/L (ref 3.5–5.1)
PROT SERPL-MCNC: 5.9 G/DL (ref 6–8.4)
RBC # BLD AUTO: 3.55 M/UL (ref 4–5.4)
SODIUM SERPL-SCNC: 143 MMOL/L (ref 136–145)
TSH SERPL DL<=0.005 MIU/L-ACNC: 2.96 UIU/ML (ref 0.4–4)
WBC # BLD AUTO: 10.12 K/UL (ref 3.9–12.7)

## 2021-05-28 PROCEDURE — 36415 COLL VENOUS BLD VENIPUNCTURE: CPT | Mod: PO | Performed by: INTERNAL MEDICINE

## 2021-05-28 PROCEDURE — 99999 PR PBB SHADOW E&M-EST. PATIENT-LVL V: ICD-10-PCS | Mod: PBBFAC,,, | Performed by: INTERNAL MEDICINE

## 2021-05-28 PROCEDURE — 83880 ASSAY OF NATRIURETIC PEPTIDE: CPT | Performed by: INTERNAL MEDICINE

## 2021-05-28 PROCEDURE — 80053 COMPREHEN METABOLIC PANEL: CPT | Performed by: INTERNAL MEDICINE

## 2021-05-28 PROCEDURE — 85025 COMPLETE CBC W/AUTO DIFF WBC: CPT | Performed by: INTERNAL MEDICINE

## 2021-05-28 PROCEDURE — 84443 ASSAY THYROID STIM HORMONE: CPT | Performed by: INTERNAL MEDICINE

## 2021-05-28 PROCEDURE — 99999 PR PBB SHADOW E&M-EST. PATIENT-LVL V: CPT | Mod: PBBFAC,,, | Performed by: INTERNAL MEDICINE

## 2021-05-28 PROCEDURE — 99214 OFFICE O/P EST MOD 30 MIN: CPT | Mod: S$PBB,,, | Performed by: INTERNAL MEDICINE

## 2021-05-28 PROCEDURE — 99215 OFFICE O/P EST HI 40 MIN: CPT | Mod: PBBFAC,PO | Performed by: INTERNAL MEDICINE

## 2021-05-28 PROCEDURE — 99214 PR OFFICE/OUTPT VISIT, EST, LEVL IV, 30-39 MIN: ICD-10-PCS | Mod: S$PBB,,, | Performed by: INTERNAL MEDICINE

## 2021-05-31 PROBLEM — I48.91 NEW ONSET A-FIB: Status: RESOLVED | Noted: 2020-09-28 | Resolved: 2021-05-31

## 2021-06-04 ENCOUNTER — HOSPITAL ENCOUNTER (OUTPATIENT)
Dept: RADIOLOGY | Facility: HOSPITAL | Age: 86
Discharge: HOME | End: 2021-06-04
Attending: INTERNAL MEDICINE
Payer: MEDICARE

## 2021-06-04 ENCOUNTER — HOSPITAL ENCOUNTER (OUTPATIENT)
Dept: CARDIOLOGY | Facility: HOSPITAL | Age: 86
Discharge: HOME | End: 2021-06-04
Attending: INTERNAL MEDICINE
Payer: MEDICARE

## 2021-06-04 VITALS
HEART RATE: 70 BPM | SYSTOLIC BLOOD PRESSURE: 130 MMHG | DIASTOLIC BLOOD PRESSURE: 84 MMHG | HEIGHT: 65 IN | BODY MASS INDEX: 20.66 KG/M2 | WEIGHT: 124 LBS

## 2021-06-04 DIAGNOSIS — R06.02 SOB (SHORTNESS OF BREATH) ON EXERTION: ICD-10-CM

## 2021-06-04 LAB
ASCENDING AORTA: 3.11 CM
AV INDEX (PROSTH): 0.59
AV MEAN GRADIENT: 5 MMHG
AV PEAK GRADIENT: 10 MMHG
AV VALVE AREA: 1.6 CM2
AV VELOCITY RATIO: 0.65
BSA FOR ECHO PROCEDURE: 1.61 M2
CV ECHO LV RWT: 0.54 CM
DOP CALC AO PEAK VEL: 1.55 M/S
DOP CALC AO VTI: 34.23 CM
DOP CALC LVOT AREA: 2.7 CM2
DOP CALC LVOT DIAMETER: 1.85 CM
DOP CALC LVOT PEAK VEL: 1 M/S
DOP CALC LVOT STROKE VOLUME: 54.67 CM3
DOP CALCLVOT PEAK VEL VTI: 20.35 CM
E WAVE DECELERATION TIME: 201.46 MSEC
E/A RATIO: 1.29
E/E' RATIO: 32.5 M/S
ECHO LV POSTERIOR WALL: 0.95 CM (ref 0.6–1.1)
EJECTION FRACTION: 65 %
FRACTIONAL SHORTENING: 32 % (ref 28–44)
INTERVENTRICULAR SEPTUM: 0.78 CM (ref 0.6–1.1)
IVRT: 82.78 MSEC
LA MAJOR: 5.33 CM
LA MINOR: 5.33 CM
LA WIDTH: 3.84 CM
LEFT ATRIUM SIZE: 2.91 CM
LEFT ATRIUM VOLUME INDEX MOD: 25 ML/M2
LEFT ATRIUM VOLUME INDEX: 31.4 ML/M2
LEFT ATRIUM VOLUME MOD: 40.25 CM3
LEFT ATRIUM VOLUME: 50.63 CM3
LEFT INTERNAL DIMENSION IN SYSTOLE: 2.4 CM (ref 2.1–4)
LEFT VENTRICLE DIASTOLIC VOLUME INDEX: 31.8 ML/M2
LEFT VENTRICLE DIASTOLIC VOLUME: 51.19 ML
LEFT VENTRICLE MASS INDEX: 52 G/M2
LEFT VENTRICLE SYSTOLIC VOLUME INDEX: 12.5 ML/M2
LEFT VENTRICLE SYSTOLIC VOLUME: 20.14 ML
LEFT VENTRICULAR INTERNAL DIMENSION IN DIASTOLE: 3.51 CM (ref 3.5–6)
LEFT VENTRICULAR MASS: 84.33 G
LV LATERAL E/E' RATIO: 26 M/S
LV SEPTAL E/E' RATIO: 43.33 M/S
MV A" WAVE DURATION": 10.56 MSEC
MV MEAN GRADIENT: 0 MMHG
MV PEAK A VEL: 1.01 M/S
MV PEAK E VEL: 1.3 M/S
MV PEAK GRADIENT: 1 MMHG
MV STENOSIS PRESSURE HALF TIME: 78.17 MS
MV VALVE AREA P 1/2 METHOD: 2.81 CM2
PISA TR MAX VEL: 3.21 M/S
PULM VEIN S/D RATIO: 0.96
PV PEAK D VEL: 0.53 M/S
PV PEAK S VEL: 0.51 M/S
RA MAJOR: 4.6 CM
RA PRESSURE: 3 MMHG
RA WIDTH: 2.9 CM
RIGHT VENTRICULAR END-DIASTOLIC DIMENSION: 2.69 CM
RV TISSUE DOPPLER FREE WALL SYSTOLIC VELOCITY 1 (APICAL 4 CHAMBER VIEW): 10.9 CM/S
SINUS: 2.91 CM
STJ: 2.42 CM
TDI LATERAL: 0.05 M/S
TDI SEPTAL: 0.03 M/S
TDI: 0.04 M/S
TR MAX PG: 41 MMHG
TRICUSPID ANNULAR PLANE SYSTOLIC EXCURSION: 1.91 CM
TV REST PULMONARY ARTERY PRESSURE: 44 MMHG

## 2021-06-04 PROCEDURE — 71046 X-RAY EXAM CHEST 2 VIEWS: CPT | Mod: TC

## 2021-06-04 PROCEDURE — 93306 TTE W/DOPPLER COMPLETE: CPT | Mod: 26,,, | Performed by: INTERNAL MEDICINE

## 2021-06-04 PROCEDURE — 71046 XR CHEST PA AND LATERAL: ICD-10-PCS | Mod: 26,,, | Performed by: RADIOLOGY

## 2021-06-04 PROCEDURE — 93306 ECHO (CUPID ONLY): ICD-10-PCS | Mod: 26,,, | Performed by: INTERNAL MEDICINE

## 2021-06-04 PROCEDURE — 93306 TTE W/DOPPLER COMPLETE: CPT

## 2021-06-04 PROCEDURE — 71046 X-RAY EXAM CHEST 2 VIEWS: CPT | Mod: 26,,, | Performed by: RADIOLOGY

## 2021-06-09 RX ORDER — MONTELUKAST SODIUM 10 MG/1
10 TABLET ORAL NIGHTLY
Qty: 90 TABLET | Refills: 0 | Status: SHIPPED | OUTPATIENT
Start: 2021-06-09 | End: 2021-08-15

## 2021-06-09 RX ORDER — ATORVASTATIN CALCIUM 20 MG/1
TABLET, FILM COATED ORAL
Qty: 90 TABLET | Refills: 0 | Status: SHIPPED | OUTPATIENT
Start: 2021-06-09 | End: 2021-09-11 | Stop reason: SDUPTHER

## 2021-06-10 ENCOUNTER — TELEPHONE (OUTPATIENT)
Dept: ENDOCRINOLOGY | Facility: CLINIC | Age: 86
End: 2021-06-10

## 2021-06-10 ENCOUNTER — PATIENT MESSAGE (OUTPATIENT)
Dept: INTERNAL MEDICINE | Facility: CLINIC | Age: 86
End: 2021-06-10

## 2021-06-10 RX ORDER — FUROSEMIDE 20 MG/1
TABLET ORAL
Qty: 10 TABLET | Refills: 0 | Status: SHIPPED | OUTPATIENT
Start: 2021-06-10 | End: 2021-08-15

## 2021-06-10 RX ORDER — INSULIN DETEMIR 100 [IU]/ML
10 INJECTION, SOLUTION SUBCUTANEOUS NIGHTLY
Qty: 6 ML | Refills: 11 | Status: SHIPPED | OUTPATIENT
Start: 2021-06-10 | End: 2021-08-16 | Stop reason: SDUPTHER

## 2021-06-14 ENCOUNTER — TELEPHONE (OUTPATIENT)
Dept: ENDOCRINOLOGY | Facility: CLINIC | Age: 86
End: 2021-06-14

## 2021-06-15 ENCOUNTER — TELEPHONE (OUTPATIENT)
Dept: INTERNAL MEDICINE | Facility: CLINIC | Age: 86
End: 2021-06-15

## 2021-06-28 ENCOUNTER — OFFICE VISIT (OUTPATIENT)
Dept: OTOLARYNGOLOGY | Facility: CLINIC | Age: 86
End: 2021-06-28
Payer: MEDICARE

## 2021-06-28 ENCOUNTER — PATIENT OUTREACH (OUTPATIENT)
Dept: ADMINISTRATIVE | Facility: OTHER | Age: 86
End: 2021-06-28

## 2021-06-28 VITALS
WEIGHT: 121.81 LBS | HEIGHT: 65 IN | DIASTOLIC BLOOD PRESSURE: 73 MMHG | HEART RATE: 72 BPM | BODY MASS INDEX: 20.29 KG/M2 | SYSTOLIC BLOOD PRESSURE: 146 MMHG

## 2021-06-28 DIAGNOSIS — J30.89 NON-SEASONAL ALLERGIC RHINITIS, UNSPECIFIED TRIGGER: Chronic | ICD-10-CM

## 2021-06-28 DIAGNOSIS — J32.8 OTHER CHRONIC SINUSITIS: Primary | Chronic | ICD-10-CM

## 2021-06-28 DIAGNOSIS — J33.8 POLYP OF PARANASAL SINUS: Chronic | ICD-10-CM

## 2021-06-28 PROCEDURE — 99999 PR PBB SHADOW E&M-EST. PATIENT-LVL IV: ICD-10-PCS | Mod: PBBFAC,,, | Performed by: OTOLARYNGOLOGY

## 2021-06-28 PROCEDURE — 99214 OFFICE O/P EST MOD 30 MIN: CPT | Mod: PBBFAC,PN | Performed by: OTOLARYNGOLOGY

## 2021-06-28 PROCEDURE — 99214 OFFICE O/P EST MOD 30 MIN: CPT | Mod: 25,S$PBB,, | Performed by: OTOLARYNGOLOGY

## 2021-06-28 PROCEDURE — 31231 NASAL ENDOSCOPY DX: CPT | Mod: PBBFAC,PN | Performed by: OTOLARYNGOLOGY

## 2021-06-28 PROCEDURE — 99999 PR PBB SHADOW E&M-EST. PATIENT-LVL IV: CPT | Mod: PBBFAC,,, | Performed by: OTOLARYNGOLOGY

## 2021-06-28 PROCEDURE — 31231 NASAL ENDOSCOPY DX: CPT | Mod: S$PBB,,, | Performed by: OTOLARYNGOLOGY

## 2021-06-28 PROCEDURE — 31231 PR NASAL ENDOSCOPY, DX: ICD-10-PCS | Mod: S$PBB,,, | Performed by: OTOLARYNGOLOGY

## 2021-06-28 PROCEDURE — 99214 PR OFFICE/OUTPT VISIT, EST, LEVL IV, 30-39 MIN: ICD-10-PCS | Mod: 25,S$PBB,, | Performed by: OTOLARYNGOLOGY

## 2021-06-28 RX ORDER — LEVOCETIRIZINE DIHYDROCHLORIDE 5 MG/1
5 TABLET, FILM COATED ORAL NIGHTLY
Qty: 30 TABLET | Refills: 11 | Status: SHIPPED | OUTPATIENT
Start: 2021-06-28 | End: 2022-06-07

## 2021-06-29 ENCOUNTER — TELEPHONE (OUTPATIENT)
Dept: OTOLARYNGOLOGY | Facility: CLINIC | Age: 86
End: 2021-06-29

## 2021-07-22 ENCOUNTER — TELEPHONE (OUTPATIENT)
Dept: INTERNAL MEDICINE | Facility: CLINIC | Age: 86
End: 2021-07-22

## 2021-07-22 RX ORDER — AZITHROMYCIN 250 MG/1
TABLET, FILM COATED ORAL
Qty: 6 TABLET | Refills: 0 | Status: SHIPPED | OUTPATIENT
Start: 2021-07-22 | End: 2021-07-27

## 2021-08-13 ENCOUNTER — HOSPITAL ENCOUNTER (OUTPATIENT)
Dept: RADIOLOGY | Facility: HOSPITAL | Age: 86
Discharge: HOME | End: 2021-08-13
Attending: INTERNAL MEDICINE
Payer: MEDICARE

## 2021-08-13 ENCOUNTER — OFFICE VISIT (OUTPATIENT)
Dept: INTERNAL MEDICINE | Facility: CLINIC | Age: 86
End: 2021-08-13
Payer: MEDICARE

## 2021-08-13 ENCOUNTER — PATIENT OUTREACH (OUTPATIENT)
Dept: ADMINISTRATIVE | Facility: OTHER | Age: 86
End: 2021-08-13

## 2021-08-13 VITALS
DIASTOLIC BLOOD PRESSURE: 88 MMHG | RESPIRATION RATE: 16 BRPM | BODY MASS INDEX: 18.3 KG/M2 | OXYGEN SATURATION: 99 % | WEIGHT: 109.81 LBS | TEMPERATURE: 98 F | HEIGHT: 65 IN | HEART RATE: 79 BPM | SYSTOLIC BLOOD PRESSURE: 140 MMHG

## 2021-08-13 DIAGNOSIS — R06.02 SOB (SHORTNESS OF BREATH) ON EXERTION: ICD-10-CM

## 2021-08-13 DIAGNOSIS — I51.89 DIASTOLIC DYSFUNCTION: ICD-10-CM

## 2021-08-13 DIAGNOSIS — I15.2 HYPERTENSION ASSOCIATED WITH DIABETES: ICD-10-CM

## 2021-08-13 DIAGNOSIS — R63.4 WEIGHT LOSS: ICD-10-CM

## 2021-08-13 DIAGNOSIS — R80.9 TYPE 2 DIABETES MELLITUS WITH MICROALBUMINURIA, WITHOUT LONG-TERM CURRENT USE OF INSULIN: ICD-10-CM

## 2021-08-13 DIAGNOSIS — R19.5 LOOSE STOOLS: Primary | ICD-10-CM

## 2021-08-13 DIAGNOSIS — E11.59 HYPERTENSION ASSOCIATED WITH DIABETES: ICD-10-CM

## 2021-08-13 DIAGNOSIS — E11.29 TYPE 2 DIABETES MELLITUS WITH MICROALBUMINURIA, WITHOUT LONG-TERM CURRENT USE OF INSULIN: ICD-10-CM

## 2021-08-13 DIAGNOSIS — R06.2 WHEEZING: ICD-10-CM

## 2021-08-13 PROCEDURE — 99214 OFFICE O/P EST MOD 30 MIN: CPT | Mod: S$PBB,,, | Performed by: INTERNAL MEDICINE

## 2021-08-13 PROCEDURE — 99999 PR PBB SHADOW E&M-EST. PATIENT-LVL III: CPT | Mod: PBBFAC,,, | Performed by: INTERNAL MEDICINE

## 2021-08-13 PROCEDURE — 99214 PR OFFICE/OUTPT VISIT, EST, LEVL IV, 30-39 MIN: ICD-10-PCS | Mod: S$PBB,,, | Performed by: INTERNAL MEDICINE

## 2021-08-13 PROCEDURE — 99213 OFFICE O/P EST LOW 20 MIN: CPT | Mod: PBBFAC,PO | Performed by: INTERNAL MEDICINE

## 2021-08-13 PROCEDURE — 71046 X-RAY EXAM CHEST 2 VIEWS: CPT | Mod: TC,PO

## 2021-08-13 PROCEDURE — 71046 XR CHEST PA AND LATERAL: ICD-10-PCS | Mod: 26,,, | Performed by: RADIOLOGY

## 2021-08-13 PROCEDURE — 71046 X-RAY EXAM CHEST 2 VIEWS: CPT | Mod: 26,,, | Performed by: RADIOLOGY

## 2021-08-13 PROCEDURE — 99999 PR PBB SHADOW E&M-EST. PATIENT-LVL III: ICD-10-PCS | Mod: PBBFAC,,, | Performed by: INTERNAL MEDICINE

## 2021-08-13 RX ORDER — ALBUTEROL SULFATE 90 UG/1
2 AEROSOL, METERED RESPIRATORY (INHALATION) EVERY 6 HOURS PRN
Qty: 18 G | Refills: 3 | Status: SHIPPED | OUTPATIENT
Start: 2021-08-13 | End: 2022-05-17 | Stop reason: SDUPTHER

## 2021-08-15 PROBLEM — I51.89 DIASTOLIC DYSFUNCTION: Status: ACTIVE | Noted: 2021-08-15

## 2021-08-16 ENCOUNTER — PATIENT MESSAGE (OUTPATIENT)
Dept: ENDOCRINOLOGY | Facility: CLINIC | Age: 86
End: 2021-08-16

## 2021-08-16 ENCOUNTER — OFFICE VISIT (OUTPATIENT)
Dept: ENDOCRINOLOGY | Facility: CLINIC | Age: 86
End: 2021-08-16
Payer: MEDICARE

## 2021-08-16 VITALS
BODY MASS INDEX: 18.88 KG/M2 | HEIGHT: 65 IN | WEIGHT: 113.31 LBS | DIASTOLIC BLOOD PRESSURE: 70 MMHG | SYSTOLIC BLOOD PRESSURE: 116 MMHG

## 2021-08-16 DIAGNOSIS — M81.0 SENILE OSTEOPOROSIS: Primary | ICD-10-CM

## 2021-08-16 DIAGNOSIS — R80.9 TYPE 2 DIABETES MELLITUS WITH MICROALBUMINURIA, WITHOUT LONG-TERM CURRENT USE OF INSULIN: ICD-10-CM

## 2021-08-16 DIAGNOSIS — E78.5 HYPERLIPIDEMIA, UNSPECIFIED HYPERLIPIDEMIA TYPE: ICD-10-CM

## 2021-08-16 DIAGNOSIS — E11.29 TYPE 2 DIABETES MELLITUS WITH MICROALBUMINURIA, WITHOUT LONG-TERM CURRENT USE OF INSULIN: ICD-10-CM

## 2021-08-16 PROCEDURE — 99215 OFFICE O/P EST HI 40 MIN: CPT | Mod: PBBFAC | Performed by: INTERNAL MEDICINE

## 2021-08-16 PROCEDURE — 99999 PR PBB SHADOW E&M-EST. PATIENT-LVL V: CPT | Mod: PBBFAC,,, | Performed by: INTERNAL MEDICINE

## 2021-08-16 PROCEDURE — 99999 PR PBB SHADOW E&M-EST. PATIENT-LVL V: ICD-10-PCS | Mod: PBBFAC,,, | Performed by: INTERNAL MEDICINE

## 2021-08-16 PROCEDURE — 99214 OFFICE O/P EST MOD 30 MIN: CPT | Mod: S$PBB,,, | Performed by: INTERNAL MEDICINE

## 2021-08-16 PROCEDURE — 99214 PR OFFICE/OUTPT VISIT, EST, LEVL IV, 30-39 MIN: ICD-10-PCS | Mod: S$PBB,,, | Performed by: INTERNAL MEDICINE

## 2021-08-16 RX ORDER — INSULIN ASPART 100 [IU]/ML
4 INJECTION, SOLUTION INTRAVENOUS; SUBCUTANEOUS
Qty: 6 ML | Refills: 11 | Status: SHIPPED | OUTPATIENT
Start: 2021-08-16 | End: 2021-08-26

## 2021-08-16 RX ORDER — PEN NEEDLE, DIABETIC 30 GX3/16"
1 NEEDLE, DISPOSABLE MISCELLANEOUS
Qty: 200 EACH | Refills: 11 | Status: SHIPPED | OUTPATIENT
Start: 2021-08-16 | End: 2022-07-05 | Stop reason: SDUPTHER

## 2021-08-16 RX ORDER — INSULIN DETEMIR 100 [IU]/ML
6 INJECTION, SOLUTION SUBCUTANEOUS NIGHTLY
Qty: 6 ML | Refills: 11 | Status: SHIPPED | OUTPATIENT
Start: 2021-08-16 | End: 2021-08-26

## 2021-08-17 ENCOUNTER — TELEPHONE (OUTPATIENT)
Dept: ENDOCRINOLOGY | Facility: CLINIC | Age: 86
End: 2021-08-17

## 2021-08-20 ENCOUNTER — TELEPHONE (OUTPATIENT)
Dept: ENDOCRINOLOGY | Facility: CLINIC | Age: 86
End: 2021-08-20

## 2021-08-26 ENCOUNTER — TELEPHONE (OUTPATIENT)
Dept: ENDOCRINOLOGY | Facility: CLINIC | Age: 86
End: 2021-08-26

## 2021-08-26 ENCOUNTER — PATIENT MESSAGE (OUTPATIENT)
Dept: ENDOCRINOLOGY | Facility: CLINIC | Age: 86
End: 2021-08-26

## 2021-08-26 DIAGNOSIS — R80.9 TYPE 2 DIABETES MELLITUS WITH MICROALBUMINURIA, WITHOUT LONG-TERM CURRENT USE OF INSULIN: Primary | ICD-10-CM

## 2021-08-26 DIAGNOSIS — R80.9 TYPE 2 DIABETES MELLITUS WITH MICROALBUMINURIA, WITHOUT LONG-TERM CURRENT USE OF INSULIN: ICD-10-CM

## 2021-08-26 DIAGNOSIS — E11.29 TYPE 2 DIABETES MELLITUS WITH MICROALBUMINURIA, WITHOUT LONG-TERM CURRENT USE OF INSULIN: Primary | ICD-10-CM

## 2021-08-26 DIAGNOSIS — E11.29 TYPE 2 DIABETES MELLITUS WITH MICROALBUMINURIA, WITHOUT LONG-TERM CURRENT USE OF INSULIN: ICD-10-CM

## 2021-08-26 RX ORDER — INSULIN DETEMIR 100 [IU]/ML
10 INJECTION, SOLUTION SUBCUTANEOUS NIGHTLY
Qty: 6 ML | Refills: 11 | Status: SHIPPED | OUTPATIENT
Start: 2021-08-26 | End: 2021-09-20

## 2021-08-26 RX ORDER — RISEDRONATE SODIUM 35 MG/1
35 TABLET, FILM COATED ORAL
Qty: 12 TABLET | Refills: 3 | Status: SHIPPED | OUTPATIENT
Start: 2021-08-26 | End: 2022-04-22

## 2021-08-26 RX ORDER — INSULIN ASPART 100 [IU]/ML
5 INJECTION, SOLUTION INTRAVENOUS; SUBCUTANEOUS
Qty: 6 ML | Refills: 11 | Status: SHIPPED | OUTPATIENT
Start: 2021-08-26 | End: 2021-12-09 | Stop reason: SDUPTHER

## 2021-08-27 ENCOUNTER — CLINICAL SUPPORT (OUTPATIENT)
Dept: DIABETES | Facility: CLINIC | Age: 86
End: 2021-08-27
Payer: MEDICARE

## 2021-08-27 DIAGNOSIS — R80.9 TYPE 2 DIABETES MELLITUS WITH MICROALBUMINURIA, WITHOUT LONG-TERM CURRENT USE OF INSULIN: ICD-10-CM

## 2021-08-27 DIAGNOSIS — E11.29 TYPE 2 DIABETES MELLITUS WITH MICROALBUMINURIA, WITHOUT LONG-TERM CURRENT USE OF INSULIN: ICD-10-CM

## 2021-08-27 PROCEDURE — G0108 DIAB MANAGE TRN  PER INDIV: HCPCS | Mod: PBBFAC | Performed by: DIETITIAN, REGISTERED

## 2021-08-27 PROCEDURE — 99211 OFF/OP EST MAY X REQ PHY/QHP: CPT | Mod: PBBFAC | Performed by: DIETITIAN, REGISTERED

## 2021-08-27 PROCEDURE — 99999 PR PBB SHADOW E&M-EST. PATIENT-LVL I: CPT | Mod: PBBFAC,,,

## 2021-08-27 PROCEDURE — 99999 PR PBB SHADOW E&M-EST. PATIENT-LVL I: ICD-10-PCS | Mod: PBBFAC,,,

## 2021-09-08 DIAGNOSIS — E11.29 TYPE 2 DIABETES MELLITUS WITH MICROALBUMINURIA, WITHOUT LONG-TERM CURRENT USE OF INSULIN: Primary | ICD-10-CM

## 2021-09-08 DIAGNOSIS — R80.9 TYPE 2 DIABETES MELLITUS WITH MICROALBUMINURIA, WITHOUT LONG-TERM CURRENT USE OF INSULIN: Primary | ICD-10-CM

## 2021-09-08 RX ORDER — LANCETS
EACH MISCELLANEOUS
Qty: 100 EACH | Refills: 3 | Status: SHIPPED | OUTPATIENT
Start: 2021-09-08 | End: 2021-10-26 | Stop reason: SDUPTHER

## 2021-09-13 RX ORDER — ATORVASTATIN CALCIUM 20 MG/1
TABLET, FILM COATED ORAL
Qty: 90 TABLET | Refills: 0 | Status: SHIPPED | OUTPATIENT
Start: 2021-09-13 | End: 2022-03-14

## 2021-09-15 RX ORDER — CARVEDILOL 6.25 MG/1
6.25 TABLET ORAL 2 TIMES DAILY WITH MEALS
Qty: 60 TABLET | Refills: 3 | Status: SHIPPED | OUTPATIENT
Start: 2021-09-15 | End: 2022-01-17

## 2021-09-15 RX ORDER — MONTELUKAST SODIUM 10 MG/1
10 TABLET ORAL NIGHTLY
Qty: 30 TABLET | Refills: 6 | Status: SHIPPED | OUTPATIENT
Start: 2021-09-15 | End: 2021-10-15

## 2021-09-20 ENCOUNTER — TELEPHONE (OUTPATIENT)
Dept: ENDOCRINOLOGY | Facility: CLINIC | Age: 86
End: 2021-09-20

## 2021-09-20 ENCOUNTER — PATIENT MESSAGE (OUTPATIENT)
Dept: ENDOCRINOLOGY | Facility: CLINIC | Age: 86
End: 2021-09-20

## 2021-09-20 RX ORDER — INSULIN DETEMIR 100 [IU]/ML
8 INJECTION, SOLUTION SUBCUTANEOUS NIGHTLY
Qty: 9 ML | Refills: 3 | Status: SHIPPED | OUTPATIENT
Start: 2021-09-20 | End: 2021-12-14 | Stop reason: SDUPTHER

## 2021-09-22 ENCOUNTER — PATIENT MESSAGE (OUTPATIENT)
Dept: ENDOCRINOLOGY | Facility: CLINIC | Age: 86
End: 2021-09-22

## 2021-09-27 ENCOUNTER — TELEPHONE (OUTPATIENT)
Dept: ENDOCRINOLOGY | Facility: CLINIC | Age: 86
End: 2021-09-27

## 2021-09-27 RX ORDER — FUROSEMIDE 20 MG/1
20 TABLET ORAL DAILY
Qty: 30 TABLET | Refills: 0 | Status: SHIPPED | OUTPATIENT
Start: 2021-09-27 | End: 2021-11-19

## 2021-09-30 ENCOUNTER — PATIENT OUTREACH (OUTPATIENT)
Dept: ADMINISTRATIVE | Facility: OTHER | Age: 86
End: 2021-09-30

## 2021-10-01 ENCOUNTER — PES CALL (OUTPATIENT)
Dept: ADMINISTRATIVE | Facility: CLINIC | Age: 86
End: 2021-10-01

## 2021-10-01 ENCOUNTER — OFFICE VISIT (OUTPATIENT)
Dept: DERMATOLOGY | Facility: CLINIC | Age: 86
End: 2021-10-01
Payer: MEDICARE

## 2021-10-01 VITALS — BODY MASS INDEX: 18.8 KG/M2 | WEIGHT: 113 LBS

## 2021-10-01 DIAGNOSIS — L30.9 DERMATITIS: ICD-10-CM

## 2021-10-01 DIAGNOSIS — I87.2 STASIS DERMATITIS OF BOTH LEGS: ICD-10-CM

## 2021-10-01 DIAGNOSIS — L60.1 ONYCHOLYSIS: Primary | ICD-10-CM

## 2021-10-01 PROCEDURE — 99999 PR PBB SHADOW E&M-EST. PATIENT-LVL IV: CPT | Mod: PBBFAC,,, | Performed by: DERMATOLOGY

## 2021-10-01 PROCEDURE — 99213 OFFICE O/P EST LOW 20 MIN: CPT | Mod: S$PBB,,, | Performed by: DERMATOLOGY

## 2021-10-01 PROCEDURE — 99213 PR OFFICE/OUTPT VISIT, EST, LEVL III, 20-29 MIN: ICD-10-PCS | Mod: S$PBB,,, | Performed by: DERMATOLOGY

## 2021-10-01 PROCEDURE — 99214 OFFICE O/P EST MOD 30 MIN: CPT | Mod: PBBFAC,PO | Performed by: DERMATOLOGY

## 2021-10-01 PROCEDURE — 99999 PR PBB SHADOW E&M-EST. PATIENT-LVL IV: ICD-10-PCS | Mod: PBBFAC,,, | Performed by: DERMATOLOGY

## 2021-10-01 RX ORDER — NEOMYCIN SULFATE, POLYMYXIN B SULFATE AND HYDROCORTISONE 10; 3.5; 1 MG/ML; MG/ML; [USP'U]/ML
SUSPENSION/ DROPS AURICULAR (OTIC)
Qty: 10 ML | Refills: 6 | Status: SHIPPED | OUTPATIENT
Start: 2021-10-01 | End: 2023-01-18

## 2021-10-04 ENCOUNTER — TELEPHONE (OUTPATIENT)
Dept: ENDOCRINOLOGY | Facility: CLINIC | Age: 86
End: 2021-10-04

## 2021-10-04 ENCOUNTER — OFFICE VISIT (OUTPATIENT)
Dept: URGENT CARE | Facility: CLINIC | Age: 86
End: 2021-10-04
Payer: MEDICARE

## 2021-10-04 VITALS
BODY MASS INDEX: 18.83 KG/M2 | TEMPERATURE: 98 F | HEIGHT: 65 IN | RESPIRATION RATE: 20 BRPM | WEIGHT: 113 LBS | OXYGEN SATURATION: 99 % | DIASTOLIC BLOOD PRESSURE: 81 MMHG | HEART RATE: 65 BPM | SYSTOLIC BLOOD PRESSURE: 160 MMHG

## 2021-10-04 DIAGNOSIS — R60.0 BILATERAL LOWER EXTREMITY EDEMA: Primary | ICD-10-CM

## 2021-10-04 PROCEDURE — 99213 PR OFFICE/OUTPT VISIT, EST, LEVL III, 20-29 MIN: ICD-10-PCS | Mod: S$GLB,,, | Performed by: PHYSICIAN ASSISTANT

## 2021-10-04 PROCEDURE — 99213 OFFICE O/P EST LOW 20 MIN: CPT | Mod: S$GLB,,, | Performed by: PHYSICIAN ASSISTANT

## 2021-10-08 ENCOUNTER — OFFICE VISIT (OUTPATIENT)
Dept: INTERNAL MEDICINE | Facility: CLINIC | Age: 86
End: 2021-10-08
Payer: MEDICARE

## 2021-10-08 VITALS
TEMPERATURE: 98 F | DIASTOLIC BLOOD PRESSURE: 60 MMHG | HEIGHT: 65 IN | WEIGHT: 116.19 LBS | BODY MASS INDEX: 19.36 KG/M2 | HEART RATE: 64 BPM | SYSTOLIC BLOOD PRESSURE: 110 MMHG | RESPIRATION RATE: 18 BRPM

## 2021-10-08 DIAGNOSIS — R60.0 BILATERAL LEG EDEMA: Primary | ICD-10-CM

## 2021-10-08 DIAGNOSIS — I51.89 DIASTOLIC DYSFUNCTION: ICD-10-CM

## 2021-10-08 DIAGNOSIS — I10 HYPERTENSION, UNSPECIFIED TYPE: ICD-10-CM

## 2021-10-08 PROCEDURE — 99214 PR OFFICE/OUTPT VISIT, EST, LEVL IV, 30-39 MIN: ICD-10-PCS | Mod: S$PBB,,, | Performed by: INTERNAL MEDICINE

## 2021-10-08 PROCEDURE — 99999 PR PBB SHADOW E&M-EST. PATIENT-LVL IV: CPT | Mod: PBBFAC,,, | Performed by: INTERNAL MEDICINE

## 2021-10-08 PROCEDURE — 99999 PR PBB SHADOW E&M-EST. PATIENT-LVL IV: ICD-10-PCS | Mod: PBBFAC,,, | Performed by: INTERNAL MEDICINE

## 2021-10-08 PROCEDURE — 99214 OFFICE O/P EST MOD 30 MIN: CPT | Mod: S$PBB,,, | Performed by: INTERNAL MEDICINE

## 2021-10-08 PROCEDURE — 99214 OFFICE O/P EST MOD 30 MIN: CPT | Mod: PBBFAC,PO | Performed by: INTERNAL MEDICINE

## 2021-10-08 RX ORDER — DOXYCYCLINE HYCLATE 100 MG
100 TABLET ORAL 2 TIMES DAILY
Qty: 14 TABLET | Refills: 0 | Status: ON HOLD | OUTPATIENT
Start: 2021-10-08 | End: 2022-04-22 | Stop reason: HOSPADM

## 2021-10-08 RX ORDER — NEOMYCIN SULFATE, POLYMYXIN B SULFATE, AND DEXAMETHASONE 3.5; 10000; 1 MG/G; [USP'U]/G; MG/G
OINTMENT OPHTHALMIC
COMMUNITY
Start: 2021-04-19 | End: 2021-12-14

## 2021-10-08 RX ORDER — BUDESONIDE 1 MG/2ML
INHALANT ORAL
Status: ON HOLD | COMMUNITY
Start: 2021-06-29 | End: 2022-07-11 | Stop reason: HOSPADM

## 2021-10-26 RX ORDER — LANCETS
EACH MISCELLANEOUS
Qty: 100 EACH | Refills: 3 | Status: SHIPPED | OUTPATIENT
Start: 2021-10-26 | End: 2021-11-29 | Stop reason: SDUPTHER

## 2021-10-26 RX ORDER — LANCETS
EACH MISCELLANEOUS
Qty: 100 EACH | Refills: 3 | OUTPATIENT
Start: 2021-10-26

## 2021-11-05 ENCOUNTER — HOSPITAL ENCOUNTER (OUTPATIENT)
Dept: RADIOLOGY | Facility: CLINIC | Age: 86
Discharge: HOME | End: 2021-11-05
Attending: INTERNAL MEDICINE
Payer: MEDICARE

## 2021-11-05 DIAGNOSIS — M81.0 SENILE OSTEOPOROSIS: ICD-10-CM

## 2021-11-05 PROCEDURE — 77080 DEXA BONE DENSITY SPINE HIP: ICD-10-PCS | Mod: 26,,, | Performed by: INTERNAL MEDICINE

## 2021-11-05 PROCEDURE — 77080 DXA BONE DENSITY AXIAL: CPT | Mod: TC

## 2021-11-05 PROCEDURE — 77080 DXA BONE DENSITY AXIAL: CPT | Mod: 26,,, | Performed by: INTERNAL MEDICINE

## 2021-11-12 ENCOUNTER — TELEPHONE (OUTPATIENT)
Dept: ENDOCRINOLOGY | Facility: CLINIC | Age: 86
End: 2021-11-12
Payer: MEDICARE

## 2021-11-12 DIAGNOSIS — M81.0 SENILE OSTEOPOROSIS: Primary | ICD-10-CM

## 2021-11-19 ENCOUNTER — OFFICE VISIT (OUTPATIENT)
Dept: INTERNAL MEDICINE | Facility: CLINIC | Age: 86
End: 2021-11-19
Payer: MEDICARE

## 2021-11-19 ENCOUNTER — LAB VISIT (OUTPATIENT)
Dept: LAB | Facility: HOSPITAL | Age: 86
End: 2021-11-19
Attending: INTERNAL MEDICINE
Payer: MEDICARE

## 2021-11-19 VITALS
BODY MASS INDEX: 19.79 KG/M2 | HEART RATE: 63 BPM | TEMPERATURE: 97 F | RESPIRATION RATE: 20 BRPM | HEIGHT: 65 IN | SYSTOLIC BLOOD PRESSURE: 110 MMHG | OXYGEN SATURATION: 98 % | DIASTOLIC BLOOD PRESSURE: 64 MMHG | WEIGHT: 118.81 LBS

## 2021-11-19 DIAGNOSIS — I51.89 DIASTOLIC DYSFUNCTION: ICD-10-CM

## 2021-11-19 DIAGNOSIS — R79.89 ELEVATED LFTS: ICD-10-CM

## 2021-11-19 DIAGNOSIS — M79.89 LEG SWELLING: Primary | ICD-10-CM

## 2021-11-19 PROCEDURE — 99215 OFFICE O/P EST HI 40 MIN: CPT | Mod: PBBFAC,PO | Performed by: INTERNAL MEDICINE

## 2021-11-19 PROCEDURE — 99999 PR PBB SHADOW E&M-EST. PATIENT-LVL V: CPT | Mod: PBBFAC,,, | Performed by: INTERNAL MEDICINE

## 2021-11-19 PROCEDURE — 99214 PR OFFICE/OUTPT VISIT, EST, LEVL IV, 30-39 MIN: ICD-10-PCS | Mod: S$PBB,,, | Performed by: INTERNAL MEDICINE

## 2021-11-19 PROCEDURE — 99214 OFFICE O/P EST MOD 30 MIN: CPT | Mod: S$PBB,,, | Performed by: INTERNAL MEDICINE

## 2021-11-19 PROCEDURE — 99999 PR PBB SHADOW E&M-EST. PATIENT-LVL V: ICD-10-PCS | Mod: PBBFAC,,, | Performed by: INTERNAL MEDICINE

## 2021-11-19 PROCEDURE — 80053 COMPREHEN METABOLIC PANEL: CPT | Performed by: INTERNAL MEDICINE

## 2021-11-19 PROCEDURE — 36415 COLL VENOUS BLD VENIPUNCTURE: CPT | Mod: PO | Performed by: INTERNAL MEDICINE

## 2021-11-19 RX ORDER — MONTELUKAST SODIUM 10 MG/1
TABLET ORAL
COMMUNITY
Start: 2021-10-16 | End: 2022-03-28 | Stop reason: SDUPTHER

## 2021-11-19 RX ORDER — FUROSEMIDE 20 MG/1
20 TABLET ORAL
Qty: 15 TABLET | Refills: 6 | Status: SHIPPED | OUTPATIENT
Start: 2021-11-19 | End: 2021-12-09 | Stop reason: SDUPTHER

## 2021-11-20 LAB
ALBUMIN SERPL BCP-MCNC: 3.3 G/DL (ref 3.5–5.2)
ALP SERPL-CCNC: 66 U/L (ref 55–135)
ALT SERPL W/O P-5'-P-CCNC: 19 U/L (ref 10–44)
ANION GAP SERPL CALC-SCNC: 7 MMOL/L (ref 8–16)
AST SERPL-CCNC: 25 U/L (ref 10–40)
BILIRUB SERPL-MCNC: 0.5 MG/DL (ref 0.1–1)
BUN SERPL-MCNC: 11 MG/DL (ref 8–23)
CALCIUM SERPL-MCNC: 9.7 MG/DL (ref 8.7–10.5)
CHLORIDE SERPL-SCNC: 114 MMOL/L (ref 95–110)
CO2 SERPL-SCNC: 25 MMOL/L (ref 23–29)
CREAT SERPL-MCNC: 0.8 MG/DL (ref 0.5–1.4)
EST. GFR  (AFRICAN AMERICAN): >60 ML/MIN/1.73 M^2
EST. GFR  (NON AFRICAN AMERICAN): >60 ML/MIN/1.73 M^2
GLUCOSE SERPL-MCNC: 59 MG/DL (ref 70–110)
POTASSIUM SERPL-SCNC: 3.8 MMOL/L (ref 3.5–5.1)
PROT SERPL-MCNC: 5.9 G/DL (ref 6–8.4)
SODIUM SERPL-SCNC: 146 MMOL/L (ref 136–145)

## 2021-11-29 ENCOUNTER — PATIENT MESSAGE (OUTPATIENT)
Dept: ENDOCRINOLOGY | Facility: CLINIC | Age: 86
End: 2021-11-29
Payer: MEDICARE

## 2021-11-29 ENCOUNTER — TELEPHONE (OUTPATIENT)
Dept: INTERNAL MEDICINE | Facility: CLINIC | Age: 86
End: 2021-11-29
Payer: MEDICARE

## 2021-11-29 DIAGNOSIS — E11.29 TYPE 2 DIABETES MELLITUS WITH MICROALBUMINURIA, WITHOUT LONG-TERM CURRENT USE OF INSULIN: ICD-10-CM

## 2021-11-29 DIAGNOSIS — E11.29 TYPE 2 DIABETES MELLITUS WITH MICROALBUMINURIA, WITHOUT LONG-TERM CURRENT USE OF INSULIN: Primary | ICD-10-CM

## 2021-11-29 DIAGNOSIS — R80.9 TYPE 2 DIABETES MELLITUS WITH MICROALBUMINURIA, WITHOUT LONG-TERM CURRENT USE OF INSULIN: ICD-10-CM

## 2021-11-29 DIAGNOSIS — R80.9 TYPE 2 DIABETES MELLITUS WITH MICROALBUMINURIA, WITHOUT LONG-TERM CURRENT USE OF INSULIN: Primary | ICD-10-CM

## 2021-11-29 RX ORDER — LANCETS
EACH MISCELLANEOUS
Qty: 100 EACH | Refills: 3 | Status: SHIPPED | OUTPATIENT
Start: 2021-11-29 | End: 2021-12-02 | Stop reason: SDUPTHER

## 2021-11-29 NOTE — TELEPHONE ENCOUNTER
----- Message from Usha Lind sent at 11/29/2021  8:26 AM CST -----  Contact: [phone]  Pt states she sent a message in regards to her weight for the week she is unsure if you all received it    11/19 115.8  11/20 116.2  11/21 114.  11/22 112  11/23 111.3  11/24 113.4  11/25 114.2  11/26 110.5  Please advise and give her a return call

## 2021-11-29 NOTE — TELEPHONE ENCOUNTER
Weights noted.  Stay oh the furosemide but change to take on M, W, F .  Give us a weight update in 2 weeks or before if problems

## 2021-12-02 ENCOUNTER — PATIENT MESSAGE (OUTPATIENT)
Dept: ENDOCRINOLOGY | Facility: CLINIC | Age: 86
End: 2021-12-02
Payer: MEDICARE

## 2021-12-02 RX ORDER — LANCETS
EACH MISCELLANEOUS
Qty: 100 EACH | Refills: 3 | Status: SHIPPED | OUTPATIENT
Start: 2021-12-02 | End: 2022-03-02

## 2021-12-07 ENCOUNTER — LAB VISIT (OUTPATIENT)
Dept: LAB | Facility: HOSPITAL | Age: 86
End: 2021-12-07
Attending: INTERNAL MEDICINE
Payer: MEDICARE

## 2021-12-07 DIAGNOSIS — M81.0 SENILE OSTEOPOROSIS: ICD-10-CM

## 2021-12-07 DIAGNOSIS — R80.9 TYPE 2 DIABETES MELLITUS WITH MICROALBUMINURIA, WITHOUT LONG-TERM CURRENT USE OF INSULIN: ICD-10-CM

## 2021-12-07 DIAGNOSIS — E11.29 TYPE 2 DIABETES MELLITUS WITH MICROALBUMINURIA, WITHOUT LONG-TERM CURRENT USE OF INSULIN: ICD-10-CM

## 2021-12-07 LAB
ANION GAP SERPL CALC-SCNC: 11 MMOL/L (ref 8–16)
BUN SERPL-MCNC: 11 MG/DL (ref 8–23)
C PEPTIDE SERPL-MCNC: 0.26 NG/ML (ref 0.78–5.19)
CALCIUM SERPL-MCNC: 9.5 MG/DL (ref 8.7–10.5)
CHLORIDE SERPL-SCNC: 109 MMOL/L (ref 95–110)
CO2 SERPL-SCNC: 24 MMOL/L (ref 23–29)
CREAT SERPL-MCNC: 0.7 MG/DL (ref 0.5–1.4)
EST. GFR  (AFRICAN AMERICAN): >60 ML/MIN/1.73 M^2
EST. GFR  (NON AFRICAN AMERICAN): >60 ML/MIN/1.73 M^2
GLUCOSE SERPL-MCNC: 50 MG/DL (ref 70–110)
POTASSIUM SERPL-SCNC: 3.7 MMOL/L (ref 3.5–5.1)
SODIUM SERPL-SCNC: 144 MMOL/L (ref 136–145)

## 2021-12-07 PROCEDURE — 86341 ISLET CELL ANTIBODY: CPT | Performed by: INTERNAL MEDICINE

## 2021-12-07 PROCEDURE — 82523 COLLAGEN CROSSLINKS: CPT | Performed by: INTERNAL MEDICINE

## 2021-12-07 PROCEDURE — 84681 ASSAY OF C-PEPTIDE: CPT | Performed by: INTERNAL MEDICINE

## 2021-12-07 PROCEDURE — 80048 BASIC METABOLIC PNL TOTAL CA: CPT | Performed by: INTERNAL MEDICINE

## 2021-12-08 ENCOUNTER — PATIENT MESSAGE (OUTPATIENT)
Dept: ENDOCRINOLOGY | Facility: CLINIC | Age: 86
End: 2021-12-08
Payer: MEDICARE

## 2021-12-09 ENCOUNTER — HOSPITAL ENCOUNTER (EMERGENCY)
Facility: HOSPITAL | Age: 86
Discharge: HOME | End: 2021-12-09
Attending: EMERGENCY MEDICINE
Payer: MEDICARE

## 2021-12-09 VITALS
TEMPERATURE: 99 F | HEIGHT: 65 IN | OXYGEN SATURATION: 97 % | SYSTOLIC BLOOD PRESSURE: 180 MMHG | DIASTOLIC BLOOD PRESSURE: 81 MMHG | RESPIRATION RATE: 20 BRPM | BODY MASS INDEX: 17.99 KG/M2 | HEART RATE: 94 BPM | WEIGHT: 108 LBS

## 2021-12-09 DIAGNOSIS — T38.3X1A INSULIN OVERDOSE, ACCIDENTAL OR UNINTENTIONAL, INITIAL ENCOUNTER: Primary | ICD-10-CM

## 2021-12-09 DIAGNOSIS — R63.4 WEIGHT LOSS: ICD-10-CM

## 2021-12-09 DIAGNOSIS — Z79.4 TYPE 2 DIABETES MELLITUS WITH HYPOGLYCEMIA WITHOUT COMA, WITH LONG-TERM CURRENT USE OF INSULIN: ICD-10-CM

## 2021-12-09 DIAGNOSIS — E11.649 TYPE 2 DIABETES MELLITUS WITH HYPOGLYCEMIA WITHOUT COMA, WITH LONG-TERM CURRENT USE OF INSULIN: ICD-10-CM

## 2021-12-09 DIAGNOSIS — E16.2 HYPOGLYCEMIA: ICD-10-CM

## 2021-12-09 DIAGNOSIS — E87.70 HYPERVOLEMIA, UNSPECIFIED HYPERVOLEMIA TYPE: ICD-10-CM

## 2021-12-09 LAB
BACTERIA #/AREA URNS AUTO: NORMAL /HPF
BASOPHILS # BLD AUTO: 0.05 K/UL (ref 0–0.2)
BASOPHILS NFR BLD: 0.5 % (ref 0–1.9)
BILIRUB UR QL STRIP: NEGATIVE
BNP SERPL-MCNC: 226 PG/ML (ref 0–99)
BUN SERPL-MCNC: 9 MG/DL (ref 6–30)
CHLORIDE SERPL-SCNC: 104 MMOL/L (ref 95–110)
CLARITY UR REFRACT.AUTO: CLEAR
COLLAGEN CTX SERPL-MCNC: 544 PG/ML
COLOR UR AUTO: YELLOW
CREAT SERPL-MCNC: 0.7 MG/DL (ref 0.5–1.4)
DIFFERENTIAL METHOD: ABNORMAL
EOSINOPHIL # BLD AUTO: 0.4 K/UL (ref 0–0.5)
EOSINOPHIL NFR BLD: 3.8 % (ref 0–8)
ERYTHROCYTE [DISTWIDTH] IN BLOOD BY AUTOMATED COUNT: 14 % (ref 11.5–14.5)
ESTIMATED AVG GLUCOSE: 160 MG/DL (ref 68–131)
ETHANOL SERPL-MCNC: <10 MG/DL
GLUCOSE SERPL-MCNC: 144 MG/DL (ref 70–110)
GLUCOSE UR QL STRIP: ABNORMAL
HBA1C MFR BLD: 7.2 % (ref 4–5.6)
HCT VFR BLD AUTO: 38 % (ref 37–48.5)
HCT VFR BLD CALC: 39 %PCV (ref 36–54)
HGB BLD-MCNC: 12.1 G/DL (ref 12–16)
HGB UR QL STRIP: NEGATIVE
HYALINE CASTS UR QL AUTO: 0 /LPF
IMM GRANULOCYTES # BLD AUTO: 0.06 K/UL (ref 0–0.04)
IMM GRANULOCYTES NFR BLD AUTO: 0.6 % (ref 0–0.5)
KETONES UR QL STRIP: NEGATIVE
LEUKOCYTE ESTERASE UR QL STRIP: NEGATIVE
LYMPHOCYTES # BLD AUTO: 1.1 K/UL (ref 1–4.8)
LYMPHOCYTES NFR BLD: 9.9 % (ref 18–48)
MCH RBC QN AUTO: 29.5 PG (ref 27–31)
MCHC RBC AUTO-ENTMCNC: 31.8 G/DL (ref 32–36)
MCV RBC AUTO: 93 FL (ref 82–98)
MICROSCOPIC COMMENT: NORMAL
MONOCYTES # BLD AUTO: 0.8 K/UL (ref 0.3–1)
MONOCYTES NFR BLD: 7.5 % (ref 4–15)
NEUTROPHILS # BLD AUTO: 8.4 K/UL (ref 1.8–7.7)
NEUTROPHILS NFR BLD: 77.7 % (ref 38–73)
NITRITE UR QL STRIP: NEGATIVE
NRBC BLD-RTO: 0 /100 WBC
PH UR STRIP: 6 [PH] (ref 5–8)
PLATELET # BLD AUTO: 204 K/UL (ref 150–450)
PMV BLD AUTO: 12.3 FL (ref 9.2–12.9)
POC IONIZED CALCIUM: 1.25 MMOL/L (ref 1.06–1.42)
POC TCO2 (MEASURED): 27 MMOL/L (ref 23–29)
POCT GLUCOSE: 150 MG/DL (ref 70–110)
POTASSIUM BLD-SCNC: 3.3 MMOL/L (ref 3.5–5.1)
PROT UR QL STRIP: ABNORMAL
RBC # BLD AUTO: 4.1 M/UL (ref 4–5.4)
RBC #/AREA URNS AUTO: 1 /HPF (ref 0–4)
SAMPLE: ABNORMAL
SODIUM BLD-SCNC: 144 MMOL/L (ref 136–145)
SP GR UR STRIP: 1.01 (ref 1–1.03)
TROPONIN I SERPL DL<=0.01 NG/ML-MCNC: 0.02 NG/ML (ref 0–0.03)
URN SPEC COLLECT METH UR: ABNORMAL
WBC # BLD AUTO: 10.8 K/UL (ref 3.9–12.7)
WBC #/AREA URNS AUTO: 1 /HPF (ref 0–5)

## 2021-12-09 PROCEDURE — 83036 HEMOGLOBIN GLYCOSYLATED A1C: CPT | Performed by: PHYSICIAN ASSISTANT

## 2021-12-09 PROCEDURE — 83880 ASSAY OF NATRIURETIC PEPTIDE: CPT | Performed by: PHYSICIAN ASSISTANT

## 2021-12-09 PROCEDURE — 85025 COMPLETE CBC W/AUTO DIFF WBC: CPT | Performed by: PHYSICIAN ASSISTANT

## 2021-12-09 PROCEDURE — 84484 ASSAY OF TROPONIN QUANT: CPT | Performed by: PHYSICIAN ASSISTANT

## 2021-12-09 PROCEDURE — 93010 EKG 12-LEAD: ICD-10-PCS | Mod: ,,, | Performed by: INTERNAL MEDICINE

## 2021-12-09 PROCEDURE — 82077 ASSAY SPEC XCP UR&BREATH IA: CPT | Performed by: EMERGENCY MEDICINE

## 2021-12-09 PROCEDURE — 82962 GLUCOSE BLOOD TEST: CPT

## 2021-12-09 PROCEDURE — 93005 ELECTROCARDIOGRAM TRACING: CPT

## 2021-12-09 PROCEDURE — 81001 URINALYSIS AUTO W/SCOPE: CPT | Performed by: PHYSICIAN ASSISTANT

## 2021-12-09 PROCEDURE — 63600175 PHARM REV CODE 636 W HCPCS: Performed by: PHYSICIAN ASSISTANT

## 2021-12-09 PROCEDURE — 93010 ELECTROCARDIOGRAM REPORT: CPT | Mod: ,,, | Performed by: INTERNAL MEDICINE

## 2021-12-09 PROCEDURE — 99285 EMERGENCY DEPT VISIT HI MDM: CPT | Mod: 25

## 2021-12-09 PROCEDURE — 80047 BASIC METABLC PNL IONIZED CA: CPT

## 2021-12-09 PROCEDURE — 99285 EMERGENCY DEPT VISIT HI MDM: CPT | Mod: ,,, | Performed by: PHYSICIAN ASSISTANT

## 2021-12-09 PROCEDURE — 25000003 PHARM REV CODE 250: Performed by: PHYSICIAN ASSISTANT

## 2021-12-09 PROCEDURE — 99285 PR EMERGENCY DEPT VISIT,LEVEL V: ICD-10-PCS | Mod: ,,, | Performed by: PHYSICIAN ASSISTANT

## 2021-12-09 PROCEDURE — 96365 THER/PROPH/DIAG IV INF INIT: CPT

## 2021-12-09 RX ORDER — FUROSEMIDE 20 MG/1
20 TABLET ORAL
Status: COMPLETED | OUTPATIENT
Start: 2021-12-09 | End: 2021-12-09

## 2021-12-09 RX ORDER — INSULIN ASPART 100 [IU]/ML
3 INJECTION, SOLUTION INTRAVENOUS; SUBCUTANEOUS
Qty: 6 ML | Refills: 11 | Status: SHIPPED | OUTPATIENT
Start: 2021-12-09 | End: 2021-12-14 | Stop reason: SDUPTHER

## 2021-12-09 RX ORDER — AZITHROMYCIN 250 MG/1
250 TABLET, FILM COATED ORAL DAILY
Qty: 6 TABLET | Refills: 0 | Status: ON HOLD | OUTPATIENT
Start: 2021-12-09 | End: 2022-04-22 | Stop reason: HOSPADM

## 2021-12-09 RX ORDER — POTASSIUM CHLORIDE 750 MG/1
10 TABLET, EXTENDED RELEASE ORAL DAILY
Qty: 7 TABLET | Refills: 0 | Status: SHIPPED | OUTPATIENT
Start: 2021-12-09 | End: 2022-05-19

## 2021-12-09 RX ORDER — MAGNESIUM SULFATE HEPTAHYDRATE 40 MG/ML
2 INJECTION, SOLUTION INTRAVENOUS
Status: COMPLETED | OUTPATIENT
Start: 2021-12-09 | End: 2021-12-09

## 2021-12-09 RX ORDER — POTASSIUM CHLORIDE 750 MG/1
30 CAPSULE, EXTENDED RELEASE ORAL ONCE
Status: COMPLETED | OUTPATIENT
Start: 2021-12-09 | End: 2021-12-09

## 2021-12-09 RX ORDER — CARVEDILOL 3.12 MG/1
6.25 TABLET ORAL
Status: COMPLETED | OUTPATIENT
Start: 2021-12-09 | End: 2021-12-09

## 2021-12-09 RX ORDER — FUROSEMIDE 20 MG/1
20 TABLET ORAL DAILY
Qty: 7 TABLET | Refills: 0 | Status: ON HOLD | OUTPATIENT
Start: 2021-12-09 | End: 2022-07-11 | Stop reason: SDUPTHER

## 2021-12-09 RX ADMIN — MAGNESIUM SULFATE 2 G: 2 INJECTION INTRAVENOUS at 04:12

## 2021-12-09 RX ADMIN — FUROSEMIDE 20 MG: 20 TABLET ORAL at 05:12

## 2021-12-09 RX ADMIN — CARVEDILOL 6.25 MG: 3.12 TABLET, FILM COATED ORAL at 05:12

## 2021-12-09 RX ADMIN — POTASSIUM CHLORIDE 30 MEQ: 10 CAPSULE, COATED, EXTENDED RELEASE ORAL at 03:12

## 2021-12-10 ENCOUNTER — TELEPHONE (OUTPATIENT)
Dept: ENDOCRINOLOGY | Facility: CLINIC | Age: 86
End: 2021-12-10
Payer: MEDICARE

## 2021-12-10 LAB — GAD65 AB SER-SCNC: 0 NMOL/L

## 2021-12-13 ENCOUNTER — HOSPITAL ENCOUNTER (OUTPATIENT)
Dept: RADIOLOGY | Facility: HOSPITAL | Age: 86
Discharge: HOME | End: 2021-12-13
Attending: INTERNAL MEDICINE
Payer: MEDICARE

## 2021-12-13 DIAGNOSIS — R79.89 ELEVATED LFTS: ICD-10-CM

## 2021-12-13 PROCEDURE — 76700 US EXAM ABDOM COMPLETE: CPT | Mod: TC

## 2021-12-13 PROCEDURE — 76700 US ABDOMEN COMPLETE: ICD-10-PCS | Mod: 26,,, | Performed by: RADIOLOGY

## 2021-12-13 PROCEDURE — 76700 US EXAM ABDOM COMPLETE: CPT | Mod: 26,,, | Performed by: RADIOLOGY

## 2021-12-14 ENCOUNTER — OFFICE VISIT (OUTPATIENT)
Dept: ENDOCRINOLOGY | Facility: CLINIC | Age: 86
End: 2021-12-14
Payer: MEDICARE

## 2021-12-14 VITALS
HEIGHT: 65 IN | DIASTOLIC BLOOD PRESSURE: 80 MMHG | SYSTOLIC BLOOD PRESSURE: 128 MMHG | BODY MASS INDEX: 17.85 KG/M2 | WEIGHT: 107.13 LBS

## 2021-12-14 DIAGNOSIS — M81.0 SENILE OSTEOPOROSIS: Primary | ICD-10-CM

## 2021-12-14 DIAGNOSIS — R80.9 TYPE 2 DIABETES MELLITUS WITH MICROALBUMINURIA, WITHOUT LONG-TERM CURRENT USE OF INSULIN: ICD-10-CM

## 2021-12-14 DIAGNOSIS — E11.29 TYPE 2 DIABETES MELLITUS WITH MICROALBUMINURIA, WITHOUT LONG-TERM CURRENT USE OF INSULIN: ICD-10-CM

## 2021-12-14 DIAGNOSIS — E78.5 HYPERLIPIDEMIA, UNSPECIFIED HYPERLIPIDEMIA TYPE: ICD-10-CM

## 2021-12-14 PROCEDURE — 99999 PR PBB SHADOW E&M-EST. PATIENT-LVL V: ICD-10-PCS | Mod: PBBFAC,,, | Performed by: INTERNAL MEDICINE

## 2021-12-14 PROCEDURE — 99214 PR OFFICE/OUTPT VISIT, EST, LEVL IV, 30-39 MIN: ICD-10-PCS | Mod: S$PBB,,, | Performed by: INTERNAL MEDICINE

## 2021-12-14 PROCEDURE — 99214 OFFICE O/P EST MOD 30 MIN: CPT | Mod: S$PBB,,, | Performed by: INTERNAL MEDICINE

## 2021-12-14 PROCEDURE — 99999 PR PBB SHADOW E&M-EST. PATIENT-LVL V: CPT | Mod: PBBFAC,,, | Performed by: INTERNAL MEDICINE

## 2021-12-14 PROCEDURE — 99215 OFFICE O/P EST HI 40 MIN: CPT | Mod: PBBFAC | Performed by: INTERNAL MEDICINE

## 2021-12-14 RX ORDER — INSULIN DETEMIR 100 [IU]/ML
5 INJECTION, SOLUTION SUBCUTANEOUS NIGHTLY
Qty: 6 ML | Refills: 3 | Status: SHIPPED | OUTPATIENT
Start: 2021-12-14 | End: 2022-03-15 | Stop reason: SDUPTHER

## 2021-12-14 RX ORDER — GLUCAGON INJECTION, SOLUTION 1 MG/.2ML
1 INJECTION, SOLUTION SUBCUTANEOUS
Qty: 1 EACH | Refills: 1 | Status: SHIPPED | OUTPATIENT
Start: 2021-12-14 | End: 2022-07-05

## 2021-12-14 RX ORDER — INSULIN ASPART 100 [IU]/ML
INJECTION, SOLUTION INTRAVENOUS; SUBCUTANEOUS
Qty: 6 ML | Refills: 11 | Status: SHIPPED | OUTPATIENT
Start: 2021-12-14 | End: 2022-03-15 | Stop reason: SDUPTHER

## 2021-12-29 ENCOUNTER — PATIENT MESSAGE (OUTPATIENT)
Dept: ENDOCRINOLOGY | Facility: CLINIC | Age: 86
End: 2021-12-29
Payer: MEDICARE

## 2022-01-05 ENCOUNTER — PATIENT OUTREACH (OUTPATIENT)
Dept: ADMINISTRATIVE | Facility: OTHER | Age: 87
End: 2022-01-05
Payer: MEDICARE

## 2022-01-05 NOTE — PROGRESS NOTES
Health Maintenance Due   Topic Date Due    Shingles Vaccine (2 of 3) 12/09/2015    Diabetes Urine Screening  07/26/2020    Lipid Panel  07/26/2020    Foot Exam  02/24/2022     Updates were requested from care everywhere.  Chart was reviewed for overdue Proactive [facility] En[facility]ters ([facility]) topics (CRS, Breast Cancer Screening, Eye exam)  Health Maintenance has been updated.  LINKS immunization registry triggered.  Immunizations were reconciled.

## 2022-01-07 ENCOUNTER — OFFICE VISIT (OUTPATIENT)
Dept: OTOLARYNGOLOGY | Facility: CLINIC | Age: 87
End: 2022-01-07
Payer: MEDICARE

## 2022-01-07 ENCOUNTER — TELEPHONE (OUTPATIENT)
Dept: OTOLARYNGOLOGY | Facility: CLINIC | Age: 87
End: 2022-01-07

## 2022-01-07 VITALS
WEIGHT: 107.56 LBS | BODY MASS INDEX: 17.92 KG/M2 | HEART RATE: 57 BPM | HEIGHT: 65 IN | DIASTOLIC BLOOD PRESSURE: 69 MMHG | SYSTOLIC BLOOD PRESSURE: 169 MMHG | TEMPERATURE: 97 F

## 2022-01-07 DIAGNOSIS — J33.8 POLYP OF PARANASAL SINUS: ICD-10-CM

## 2022-01-07 DIAGNOSIS — J32.8 OTHER CHRONIC SINUSITIS: Primary | Chronic | ICD-10-CM

## 2022-01-07 DIAGNOSIS — J30.89 NON-SEASONAL ALLERGIC RHINITIS, UNSPECIFIED TRIGGER: Chronic | ICD-10-CM

## 2022-01-07 PROCEDURE — 99999 PR PBB SHADOW E&M-EST. PATIENT-LVL V: ICD-10-PCS | Mod: PBBFAC,,, | Performed by: OTOLARYNGOLOGY

## 2022-01-07 PROCEDURE — 99214 OFFICE O/P EST MOD 30 MIN: CPT | Mod: 25,S$PBB,, | Performed by: OTOLARYNGOLOGY

## 2022-01-07 PROCEDURE — 99999 PR PBB SHADOW E&M-EST. PATIENT-LVL V: CPT | Mod: PBBFAC,,, | Performed by: OTOLARYNGOLOGY

## 2022-01-07 PROCEDURE — 31231 PR NASAL ENDOSCOPY, DX: ICD-10-PCS | Mod: S$PBB,,, | Performed by: OTOLARYNGOLOGY

## 2022-01-07 PROCEDURE — 99215 OFFICE O/P EST HI 40 MIN: CPT | Mod: PBBFAC,PN,25 | Performed by: OTOLARYNGOLOGY

## 2022-01-07 PROCEDURE — 99214 PR OFFICE/OUTPT VISIT, EST, LEVL IV, 30-39 MIN: ICD-10-PCS | Mod: 25,S$PBB,, | Performed by: OTOLARYNGOLOGY

## 2022-01-07 PROCEDURE — 31231 NASAL ENDOSCOPY DX: CPT | Mod: PBBFAC,PN | Performed by: OTOLARYNGOLOGY

## 2022-01-07 PROCEDURE — 31231 NASAL ENDOSCOPY DX: CPT | Mod: S$PBB,,, | Performed by: OTOLARYNGOLOGY

## 2022-01-07 NOTE — PROGRESS NOTES
"CC: follow up CRS    Subjective:      Sussy Costa is a 86 y.o. female who comes for follow-up for chronic rhino sinusitis with nasal polyposis(AERD) status-post revision endoscopic sinus surgery remotely. She states that she has been using her nasal saline rinses with budesonide once daily.  She feels this helps control her symptoms for the most part. She does have intermittent post-nasal drip.    She denies rhinorrhea, post-nasal drip, facial pain or pressure, epistaxis.     Objective:     BP (!) 169/69 (BP Location: Right arm, Patient Position: Sitting, BP Method: Large (Automatic))   Pulse (!) 57   Temp 97.3 °F (36.3 °C) (Oral)   Ht 5' 5" (1.651 m)   Wt 48.8 kg (107 lb 9.4 oz)   BMI 17.90 kg/m²      Constitutional: Well appearing / communicating.  NAD.  Eyes: EOM I Bilaterally  Head/Face: Normocephalic.  Negative paranasal sinus pressure/tenderness.  Salivary glands WNL.  House Brackmann I Bilaterally.    Right Ear: External Auditory Canal WNL,TM w/o masses/lesions/perforations.  Auricle WNL.  Left Ear: External Auditory Canal WNL,TM w/o masses/lesions/perforations. Auricle WNL.  Nose: No gross nasal septal deviation. Inferior Turbinates 3+ bilaterally. No septal perforation. No masses/lesions. External nasal skin without masses/lesions.  Oral Cavity: Gingiva/lips WNL.  FOM Soft, no masses palpated. Oral Tongue mobile. Hard Palate WNL.   Oropharynx: BOT WNL. No masses/lesions noted. Tonsillar fossa/pharyngeal wall without lesions. Posterior oropharynx WNL.  Soft palate without masses. Midline uvula.   Neck/Lymphatic: No LAD I-VI bilaterally.  No thyromegaly.  No masses noted on exam.      Procedure    Nasal endoscopy performed.  See procedure note.        Data Reviewed    Pathology report indicated chronic inflammation with eosinophilia.       Assessment:     1. Other chronic sinusitis    2. Non-seasonal allergic rhinitis, unspecified trigger    3. Polyp of paranasal sinus         Plan:     Continue " budesonide and mupirocin nasal nebulized solution and nasal saline rinses(refills provided).   Continue Singulair and Xyzal.     Follow up in about 4 months (around 5/7/2022).     Rosangela Isabel MD

## 2022-01-07 NOTE — TELEPHONE ENCOUNTER
Spoke with Maritza. Provided refill x3 , per Dr. Colin. Maritza thanked me and verbalized understanding.

## 2022-01-07 NOTE — PROCEDURES
Procedures       PROCEDURE NOTE:  Nasal endoscopy   Preprocedure diagnosis:  Chronic sinusitis with polyposis  Postprocedure diangosis:  Same  Complications:  None  Blood Loss:  None    Procedure in detail:  After verbal consent was obtained, the patient's nasal cavity was anesthesized using topical 1%lidocaine and Neosynepherine.  A rigid 0 degree endoscope was placed in first the right, then the left nasal cavity.  The inferior and middle turbinates were examined, and found to be boggy with edema bilaterally.   The middle meatus and maxillary antrum was also examined, and found to be patent bilaterally. Sphenoidotomy patent bilaterally. No evidence of brittany polyps.  No purulent drainage or masses seen. + crusting right posterior ethmoid cavity.   The patient tolerated the procedure well and there were no complications.

## 2022-01-11 ENCOUNTER — INFUSION (OUTPATIENT)
Dept: INFECTIOUS DISEASES | Facility: HOSPITAL | Age: 87
End: 2022-01-11
Attending: INTERNAL MEDICINE
Payer: MEDICARE

## 2022-01-11 VITALS
HEART RATE: 86 BPM | DIASTOLIC BLOOD PRESSURE: 69 MMHG | RESPIRATION RATE: 16 BRPM | OXYGEN SATURATION: 97 % | SYSTOLIC BLOOD PRESSURE: 131 MMHG | WEIGHT: 107.81 LBS | TEMPERATURE: 97 F | BODY MASS INDEX: 17.94 KG/M2

## 2022-01-11 DIAGNOSIS — M81.0 SENILE OSTEOPOROSIS: Primary | ICD-10-CM

## 2022-01-11 PROCEDURE — 63600175 PHARM REV CODE 636 W HCPCS: Mod: JG | Performed by: INTERNAL MEDICINE

## 2022-01-11 PROCEDURE — 96372 THER/PROPH/DIAG INJ SC/IM: CPT

## 2022-01-11 RX ADMIN — DENOSUMAB 60 MG: 60 INJECTION SUBCUTANEOUS at 02:01

## 2022-01-11 NOTE — PROGRESS NOTES
Pt received prolia injection to left arm SQ, pt takes VIT D, denies dental surgery, pt observed for 15 min then left in NAD

## 2022-02-18 ENCOUNTER — PES CALL (OUTPATIENT)
Dept: ADMINISTRATIVE | Facility: CLINIC | Age: 87
End: 2022-02-18
Payer: MEDICARE

## 2022-03-08 ENCOUNTER — LAB VISIT (OUTPATIENT)
Dept: LAB | Facility: HOSPITAL | Age: 87
End: 2022-03-08
Attending: INTERNAL MEDICINE
Payer: MEDICARE

## 2022-03-08 DIAGNOSIS — E78.5 HYPERLIPIDEMIA, UNSPECIFIED HYPERLIPIDEMIA TYPE: ICD-10-CM

## 2022-03-08 DIAGNOSIS — R80.9 TYPE 2 DIABETES MELLITUS WITH MICROALBUMINURIA, WITHOUT LONG-TERM CURRENT USE OF INSULIN: ICD-10-CM

## 2022-03-08 DIAGNOSIS — E11.29 TYPE 2 DIABETES MELLITUS WITH MICROALBUMINURIA, WITHOUT LONG-TERM CURRENT USE OF INSULIN: ICD-10-CM

## 2022-03-08 DIAGNOSIS — M81.0 SENILE OSTEOPOROSIS: ICD-10-CM

## 2022-03-08 LAB
25(OH)D3+25(OH)D2 SERPL-MCNC: 30 NG/ML (ref 30–96)
ALBUMIN SERPL BCP-MCNC: 3.1 G/DL (ref 3.5–5.2)
ANION GAP SERPL CALC-SCNC: 7 MMOL/L (ref 8–16)
BUN SERPL-MCNC: 9 MG/DL (ref 8–23)
C PEPTIDE SERPL-MCNC: 0.77 NG/ML (ref 0.78–5.19)
CALCIUM SERPL-MCNC: 9.2 MG/DL (ref 8.7–10.5)
CHLORIDE SERPL-SCNC: 107 MMOL/L (ref 95–110)
CO2 SERPL-SCNC: 29 MMOL/L (ref 23–29)
CREAT SERPL-MCNC: 0.7 MG/DL (ref 0.5–1.4)
EST. GFR  (AFRICAN AMERICAN): >60 ML/MIN/1.73 M^2
EST. GFR  (NON AFRICAN AMERICAN): >60 ML/MIN/1.73 M^2
ESTIMATED AVG GLUCOSE: 232 MG/DL (ref 68–131)
GLUCOSE SERPL-MCNC: 207 MG/DL (ref 70–110)
HBA1C MFR BLD: 9.7 % (ref 4–5.6)
PHOSPHATE SERPL-MCNC: 2.6 MG/DL (ref 2.7–4.5)
POTASSIUM SERPL-SCNC: 3.2 MMOL/L (ref 3.5–5.1)
SODIUM SERPL-SCNC: 143 MMOL/L (ref 136–145)

## 2022-03-08 PROCEDURE — 82306 VITAMIN D 25 HYDROXY: CPT | Performed by: INTERNAL MEDICINE

## 2022-03-08 PROCEDURE — 80069 RENAL FUNCTION PANEL: CPT | Performed by: INTERNAL MEDICINE

## 2022-03-08 PROCEDURE — 84681 ASSAY OF C-PEPTIDE: CPT | Performed by: INTERNAL MEDICINE

## 2022-03-08 PROCEDURE — 83036 HEMOGLOBIN GLYCOSYLATED A1C: CPT | Performed by: INTERNAL MEDICINE

## 2022-03-08 PROCEDURE — 36415 COLL VENOUS BLD VENIPUNCTURE: CPT | Performed by: INTERNAL MEDICINE

## 2022-03-14 ENCOUNTER — PATIENT OUTREACH (OUTPATIENT)
Dept: ADMINISTRATIVE | Facility: OTHER | Age: 87
End: 2022-03-14
Payer: MEDICARE

## 2022-03-15 ENCOUNTER — OFFICE VISIT (OUTPATIENT)
Dept: ENDOCRINOLOGY | Facility: CLINIC | Age: 87
End: 2022-03-15
Payer: MEDICARE

## 2022-03-15 VITALS
WEIGHT: 104.75 LBS | BODY MASS INDEX: 17.45 KG/M2 | DIASTOLIC BLOOD PRESSURE: 80 MMHG | SYSTOLIC BLOOD PRESSURE: 128 MMHG | HEIGHT: 65 IN

## 2022-03-15 DIAGNOSIS — M81.0 SENILE OSTEOPOROSIS: Primary | ICD-10-CM

## 2022-03-15 DIAGNOSIS — Z79.4 TYPE 2 DIABETES MELLITUS WITH HYPERGLYCEMIA, WITH LONG-TERM CURRENT USE OF INSULIN: ICD-10-CM

## 2022-03-15 DIAGNOSIS — E11.29 TYPE 2 DIABETES MELLITUS WITH MICROALBUMINURIA, WITHOUT LONG-TERM CURRENT USE OF INSULIN: ICD-10-CM

## 2022-03-15 DIAGNOSIS — R80.9 TYPE 2 DIABETES MELLITUS WITH MICROALBUMINURIA, WITHOUT LONG-TERM CURRENT USE OF INSULIN: ICD-10-CM

## 2022-03-15 DIAGNOSIS — E78.2 MIXED HYPERLIPIDEMIA: ICD-10-CM

## 2022-03-15 DIAGNOSIS — E11.65 TYPE 2 DIABETES MELLITUS WITH HYPERGLYCEMIA, WITH LONG-TERM CURRENT USE OF INSULIN: ICD-10-CM

## 2022-03-15 PROCEDURE — 99214 OFFICE O/P EST MOD 30 MIN: CPT | Mod: S$PBB,,, | Performed by: INTERNAL MEDICINE

## 2022-03-15 PROCEDURE — 99999 PR PBB SHADOW E&M-EST. PATIENT-LVL V: CPT | Mod: PBBFAC,,, | Performed by: INTERNAL MEDICINE

## 2022-03-15 PROCEDURE — 99999 PR PBB SHADOW E&M-EST. PATIENT-LVL V: ICD-10-PCS | Mod: PBBFAC,,, | Performed by: INTERNAL MEDICINE

## 2022-03-15 PROCEDURE — 99215 OFFICE O/P EST HI 40 MIN: CPT | Mod: PBBFAC | Performed by: INTERNAL MEDICINE

## 2022-03-15 PROCEDURE — 99214 PR OFFICE/OUTPT VISIT, EST, LEVL IV, 30-39 MIN: ICD-10-PCS | Mod: S$PBB,,, | Performed by: INTERNAL MEDICINE

## 2022-03-15 RX ORDER — INSULIN ASPART 100 [IU]/ML
INJECTION, SOLUTION INTRAVENOUS; SUBCUTANEOUS
Qty: 15 ML | Refills: 11 | Status: SHIPPED | OUTPATIENT
Start: 2022-03-15 | End: 2022-04-08 | Stop reason: SDUPTHER

## 2022-03-15 RX ORDER — LANCETS
EACH MISCELLANEOUS
Qty: 200 EACH | Refills: 3 | Status: SHIPPED | OUTPATIENT
Start: 2022-03-15 | End: 2023-01-17

## 2022-03-15 RX ORDER — INSULIN DETEMIR 100 [IU]/ML
3 INJECTION, SOLUTION SUBCUTANEOUS 2 TIMES DAILY
Qty: 15 ML | Refills: 11 | Status: SHIPPED | OUTPATIENT
Start: 2022-03-15 | End: 2023-04-19

## 2022-03-15 NOTE — PROGRESS NOTES
Subjective:      Patient ID: Sussy Costa is a 86 y.o. female.    Chief Complaint:  Senile osteoporosis      History of Present Illness  Ms. Costa presents for follow up of osteoporosis and type 2 DM.  Last visit 12/2021     She was given first dose of Prolia in 2/2018 and developed joint pains and lower ext edema so it was decided that she would not receive another dose of Prolia. We decided against Reclast for the same reason and instead restarted her on an oral bisphosphonate which she has tolerated without side effects. After her last visit 12/2021 we decided to stop the oral bisphosphonate and try Prolia again. She received Prolia  in 1/2022 without any issues.      No significant changes in medical history since her last visit.       First diagnosed with osteoporosis in 2007.     Current osteoporosis regimen:  Back on Prolia since 1/2022  Vitamin D 1000 units daily  No calcium supplementation at present     Osteoporosis medication history:  Actonel 8/2018 - 12/2021  Fosamax     Risk factors for osteoporosis  -Personal history of fracture: had a fracture of left 5th finger, no hx of fragility frax  -Family history of hip fracture or osteoporosis: mother had hip frax at age 92  -Premature/Early menopause: menopause at age 56, on HRT for a few months in the past  -Chronic steroid therapy: intermittently gets steroid taper for asthma  -History of rheumatoid arthritis: none  -Smoking History: never smoked  -Current EtOH use: social EtOH  -Fall Risk: good balance, has tripped and fallen twice in past 10-15 years     Height loss:  1/2 inch height loss     Exercise:  No formal exercise  No falls     Pending dental work:  None     Last Bone Density Scan dated 11/2021:  Comparison study done on 11/14/2019.     Lumbar spine:    BMD 0.830 g/cm2 and T-score -2.0.     Total Hip:           BMD 0.628 g/cm2 and T-score -2.6.     Distal 1/3 radius:     FINDINGS:  Lumbar spine (L1-L4):              BMD is 0.830 g/cm2,  T-score is -2.0, and Z-score is 0.9.     Total hip:                                BMD is 0.599 g/cm2, T-score is -2.8, and Z-score is -0.5.     Femoral neck:                          BMD is 0.504 g/cm2, T-score is -3.1, and Z-score is -0.6.     Distal 1/3 radius:                      Not applicable     FRAX:     39% risk of a major osteoporotic fracture in the next 10 years.     30% risk of hip fracture in the next 10 years.        Diabetes control labile and has been worsening. No severe hypoglycemia since last visit, and she has mostly been having elevated glucoses. She has not been eating as much for fear of hyperglycemia. Of note, she has lost about 30-35 lbs in the past 12-24 months and her diabetes has become difficult to control over that time frame. She now has a low c-peptide suggesting decreased pancreatic reserve. She has a history of pancreatic cysts that have been monitored by Dr. Rosado.      Currently on Levemir 5 units qhs and Novolog 3 units with breakfast, and 4 units with lunch and dinner.      Latest Reference Range & Units 08/13/21 10:38 12/07/21 09:41 12/09/21 14:31 03/08/22 10:13   Hemoglobin A1C External 4.0 - 5.6 % 8.4 (H) [1]  7.2 (H) [2] 9.7 (H) [3]   C-Peptide 0.78 - 5.19 ng/mL  0.26 (L)  0.77 (L)   Glutamic Acid Decarb Ab <=0.02 nmol/L  0.00 [4]          Reviewed glucose logs:          Review of Systems   Constitutional: Negative for chills and fever.   Gastrointestinal: Negative for nausea.       Objective:   Physical Exam  Vitals and nursing note reviewed.       BP Readings from Last 3 Encounters:   03/15/22 128/80   01/11/22 131/69   01/07/22 (!) 169/69     Wt Readings from Last 1 Encounters:   03/15/22 1127 47.5 kg (104 lb 11.5 oz)       Body mass index is 17.43 kg/m².    Lab Review:   Lab Results   Component Value Date    HGBA1C 9.7 (H) 03/08/2022     Lab Results   Component Value Date    CHOL 150 07/26/2019    HDL 52 07/26/2019    LDLCALC 82.2 07/26/2019    TRIG 79 07/26/2019     CHOLHDL 34.7 07/26/2019     Lab Results   Component Value Date     03/08/2022    K 3.2 (L) 03/08/2022     03/08/2022    CO2 29 03/08/2022     (H) 03/08/2022    BUN 9 03/08/2022    CREATININE 0.7 03/08/2022    CALCIUM 9.2 03/08/2022    PROT 5.9 (L) 11/19/2021    ALBUMIN 3.1 (L) 03/08/2022    BILITOT 0.5 11/19/2021    ALKPHOS 66 11/19/2021    AST 25 11/19/2021    ALT 19 11/19/2021    ANIONGAP 7 (L) 03/08/2022    ESTGFRAFRICA >60.0 03/08/2022    EGFRNONAA >60.0 03/08/2022    TSH 2.959 05/28/2021         Assessment and Plan     Senile osteoporosis  --Patient with postmenopausal osteoporosis  --Risk factors include: age, post menopausal status, and fam hx of hip fracture  --Stopped risedronate and restarted denosumab after last visit  --She receieved denosumab in 1/2022 and tolerated without side effects  --Repeat BMD in 11/2023  --To continue vit d replacement  --Continue calcium supplemenation      Type 2 diabetes mellitus with hyperglycemia, with long-term current use of insulin  --A1c goal <8.0%  --A1c increased above goal and continues to have glucose lability  --She is very sensitive to small changes in insulin to to low body weight  --Will increase Levemir to 6 units qhs   --Change Novolog to 4 units with breakfast, 4 units with lunch and 5 units with dinner  --She prefers to continue fingerstick glucoses at this time rather than use the Dexcom or Freestyle  --Will discuss with Dr. Casillas to see if she needs more pancreas imaging given weight loss over the past year, lability of glucose and decreasing pancreatic reserve    Hyperlipidemia  --On statin      Follow up in 3-4 months with labs prior      Joshua Martines M.D. Staff Endocrinology

## 2022-03-15 NOTE — ASSESSMENT & PLAN NOTE
--A1c goal <8.0%  --A1c increased above goal and continues to have glucose lability  --She is very sensitive to small changes in insulin to to low body weight  --Will increase Levemir to 6 units qhs   --Change Novolog to 4 units with breakfast, 4 units with lunch and 5 units with dinner  --She prefers to continue fingerstick glucoses at this time rather than use the Dexcom or Freestyle  --Will discuss with Dr. Casillas to see if she needs more pancreas imaging given weight loss over the past year, lability of glucose and decreasing pancreatic reserve

## 2022-03-15 NOTE — ASSESSMENT & PLAN NOTE
--Patient with postmenopausal osteoporosis  --Risk factors include: age, post menopausal status, and fam hx of hip fracture  --Stopped risedronate and restarted denosumab after last visit  --She receieved denosumab in 1/2022 and tolerated without side effects  --Repeat BMD in 11/2023  --To continue vit d replacement  --Continue calcium supplemenation

## 2022-03-22 ENCOUNTER — TELEPHONE (OUTPATIENT)
Dept: ENDOSCOPY | Facility: HOSPITAL | Age: 87
End: 2022-03-22
Payer: MEDICARE

## 2022-03-22 DIAGNOSIS — K86.2 PANCREATIC CYST: Primary | ICD-10-CM

## 2022-03-23 NOTE — TELEPHONE ENCOUNTER
----- Message from Max Rosado MD sent at 3/22/2022 11:24 AM CDT -----  Regarding: RE: pancreatic cysts  I will recommend a CT scan pancreas protocol and we can see her in clinic after.  Thanks,  Max Rosado MD  ----- Message -----  From: PAULA Casillas MD  Sent: 3/21/2022   2:02 PM CDT  To: Max Rosado MD  Subject: pancreatic cysts                                 Hello Dr Rosado--  [place] pt.  You have seen her in past for pancreatic cysts but it his been a couple of years.  Dr Martines in Endo managing diabetes notes more difficulty with BS control. She has had some weight loss. She likely needs more directed updated imaging performed--MRI, EUS or other.  Could you give your thoughts or have an updated visit with her.  Thanks so much--  pwb

## 2022-03-24 ENCOUNTER — PATIENT MESSAGE (OUTPATIENT)
Dept: INTERNAL MEDICINE | Facility: CLINIC | Age: 87
End: 2022-03-24
Payer: MEDICARE

## 2022-03-24 ENCOUNTER — TELEPHONE (OUTPATIENT)
Dept: ENDOSCOPY | Facility: HOSPITAL | Age: 87
End: 2022-03-24
Payer: MEDICARE

## 2022-03-24 NOTE — TELEPHONE ENCOUNTER
Yes, I communicated to Dr Rosado in Gastro.  He has ordered updated CT---they should be contacting to schedule

## 2022-03-25 NOTE — TELEPHONE ENCOUNTER
----- Message from Max Rosado MD sent at 3/22/2022 11:24 AM CDT -----  Regarding: RE: pancreatic cysts  I will recommend a CT scan pancreas protocol and we can see her in clinic after.  Thanks,  Max Rosado MD  ----- Message -----  From: PAULA Casillas MD  Sent: 3/21/2022   2:02 PM CDT  To: Max Rosado MD  Subject: pancreatic cysts                                 Hello Dr Rosado--  [place] pt.  You have seen her in past for pancreatic cysts but it his been a couple of years.  Dr Martines in Endo managing diabetes notes more difficulty with BS control. She has had some weight loss. She likely needs more directed updated imaging performed--MRI, EUS or other.  Could you give your thoughts or have an updated visit with her.  Thanks so much--  pwb

## 2022-03-26 ENCOUNTER — PATIENT MESSAGE (OUTPATIENT)
Dept: ENDOSCOPY | Facility: HOSPITAL | Age: 87
End: 2022-03-26
Payer: MEDICARE

## 2022-03-28 RX ORDER — MONTELUKAST SODIUM 10 MG/1
TABLET ORAL
Qty: 30 TABLET | Refills: 6 | Status: SHIPPED | OUTPATIENT
Start: 2022-03-28 | End: 2022-04-13

## 2022-03-28 NOTE — TELEPHONE ENCOUNTER
Pt advises that her Singulair is awaiting MD approval.    She is currently out of medication.    Thank you.

## 2022-04-08 ENCOUNTER — TELEPHONE (OUTPATIENT)
Dept: ENDOCRINOLOGY | Facility: CLINIC | Age: 87
End: 2022-04-08
Payer: MEDICARE

## 2022-04-08 RX ORDER — INSULIN ASPART 100 [IU]/ML
INJECTION, SOLUTION INTRAVENOUS; SUBCUTANEOUS
Qty: 15 ML | Refills: 11 | Status: SHIPPED | OUTPATIENT
Start: 2022-04-08 | End: 2022-07-05 | Stop reason: SDUPTHER

## 2022-04-08 NOTE — TELEPHONE ENCOUNTER
Please advise patient that I reviewed her glucose logs. I recommend she continue the Levemir at 3 units twice daily. I recommend she increase her Apart dose to 5 units with breakfast and lunch, and 6 units with dinner plus the sliding scale.

## 2022-04-11 ENCOUNTER — HOSPITAL ENCOUNTER (OUTPATIENT)
Dept: RADIOLOGY | Facility: HOSPITAL | Age: 87
Discharge: HOME | End: 2022-04-11
Attending: INTERNAL MEDICINE
Payer: MEDICARE

## 2022-04-11 DIAGNOSIS — K86.2 PANCREATIC CYST: ICD-10-CM

## 2022-04-11 LAB
CREAT SERPL-MCNC: 0.7 MG/DL (ref 0.5–1.4)
SAMPLE: NORMAL

## 2022-04-11 PROCEDURE — 74160 CT ABDOMEN WITH CONTRAST: ICD-10-PCS | Mod: 26,,, | Performed by: INTERNAL MEDICINE

## 2022-04-11 PROCEDURE — 74160 CT ABDOMEN W/CONTRAST: CPT | Mod: 26,,, | Performed by: INTERNAL MEDICINE

## 2022-04-11 PROCEDURE — 25500020 PHARM REV CODE 255: Performed by: INTERNAL MEDICINE

## 2022-04-11 PROCEDURE — 74160 CT ABDOMEN W/CONTRAST: CPT | Mod: TC

## 2022-04-11 RX ADMIN — IOHEXOL 75 ML: 350 INJECTION, SOLUTION INTRAVENOUS at 03:04

## 2022-04-11 NOTE — TELEPHONE ENCOUNTER
Spoke to patient and let her know that Dr Martines reviewed her glucose logs. Dr Martines recommend she continue the Levemir at 3 units twice daily. Dr Martines recommend she increase her Apart dose to 5 units with breakfast and lunch, and 6 units with dinner plus the sliding scale

## 2022-04-12 ENCOUNTER — TELEPHONE (OUTPATIENT)
Dept: ENDOSCOPY | Facility: HOSPITAL | Age: 87
End: 2022-04-12
Payer: MEDICARE

## 2022-04-12 ENCOUNTER — PATIENT MESSAGE (OUTPATIENT)
Dept: ENDOSCOPY | Facility: HOSPITAL | Age: 87
End: 2022-04-12
Payer: MEDICARE

## 2022-04-12 DIAGNOSIS — R93.89 ABNORMAL FINDING ON IMAGING: Primary | ICD-10-CM

## 2022-04-12 NOTE — TELEPHONE ENCOUNTER
----- Message from Max Rosado MD sent at 4/12/2022  2:51 PM CDT -----  The patient was informed about the results of the CT scan. She need an urgent EUS (linear) for abnormal CT scan with dilated PD and increased cyst of pancreas cyst. 60 minutes. She also need a CT scan of the chest for lung lesions.    Thanks,  Max Rosado MD

## 2022-04-13 ENCOUNTER — PATIENT MESSAGE (OUTPATIENT)
Dept: ENDOSCOPY | Facility: HOSPITAL | Age: 87
End: 2022-04-13
Payer: MEDICARE

## 2022-04-13 ENCOUNTER — TELEPHONE (OUTPATIENT)
Dept: ENDOSCOPY | Facility: HOSPITAL | Age: 87
End: 2022-04-13
Payer: MEDICARE

## 2022-04-13 NOTE — TELEPHONE ENCOUNTER
Received request to schedule patient for EUS. Spoke with Patient. Reviewed medical history and medications. Scheduled 4/22/22 at 11:15 am. Instructed on procedure and prep. Patient verbalized understanding of instructions. Copy of instructions sent to patient via portal.

## 2022-04-22 ENCOUNTER — ANESTHESIA (OUTPATIENT)
Dept: ENDOSCOPY | Facility: HOSPITAL | Age: 87
End: 2022-04-22
Payer: MEDICARE

## 2022-04-22 ENCOUNTER — ANESTHESIA EVENT (OUTPATIENT)
Dept: ENDOSCOPY | Facility: HOSPITAL | Age: 87
End: 2022-04-22
Payer: MEDICARE

## 2022-04-22 ENCOUNTER — HOSPITAL ENCOUNTER (OUTPATIENT)
Facility: HOSPITAL | Age: 87
Discharge: HOME | End: 2022-04-22
Attending: INTERNAL MEDICINE | Admitting: INTERNAL MEDICINE
Payer: MEDICARE

## 2022-04-22 ENCOUNTER — TELEPHONE (OUTPATIENT)
Dept: ENDOSCOPY | Facility: HOSPITAL | Age: 87
End: 2022-04-22
Payer: MEDICARE

## 2022-04-22 VITALS
RESPIRATION RATE: 44 BRPM | SYSTOLIC BLOOD PRESSURE: 182 MMHG | TEMPERATURE: 98 F | OXYGEN SATURATION: 99 % | BODY MASS INDEX: 17.33 KG/M2 | HEART RATE: 83 BPM | DIASTOLIC BLOOD PRESSURE: 145 MMHG | WEIGHT: 104 LBS | HEIGHT: 65 IN

## 2022-04-22 DIAGNOSIS — K86.2 PANCREAS CYST: Primary | ICD-10-CM

## 2022-04-22 DIAGNOSIS — R93.89 ABNORMAL FINDING ON IMAGING: Primary | ICD-10-CM

## 2022-04-22 DIAGNOSIS — R93.5 ABNORMAL CT OF THE ABDOMEN: ICD-10-CM

## 2022-04-22 LAB — POCT GLUCOSE: 242 MG/DL (ref 70–110)

## 2022-04-22 PROCEDURE — 43242 EGD US FINE NEEDLE BX/ASPIR: CPT | Mod: ,,, | Performed by: INTERNAL MEDICINE

## 2022-04-22 PROCEDURE — 82378 CARCINOEMBRYONIC ANTIGEN: CPT | Performed by: INTERNAL MEDICINE

## 2022-04-22 PROCEDURE — 25000003 PHARM REV CODE 250: Performed by: NURSE ANESTHETIST, CERTIFIED REGISTERED

## 2022-04-22 PROCEDURE — 43242 EGD US FINE NEEDLE BX/ASPIR: CPT | Performed by: INTERNAL MEDICINE

## 2022-04-22 PROCEDURE — 25000003 PHARM REV CODE 250: Performed by: INTERNAL MEDICINE

## 2022-04-22 PROCEDURE — 27202059 HC NEEDLE, FNA (ANY): Performed by: INTERNAL MEDICINE

## 2022-04-22 PROCEDURE — 37000009 HC ANESTHESIA EA ADD 15 MINS: Performed by: INTERNAL MEDICINE

## 2022-04-22 PROCEDURE — D9220A PRA ANESTHESIA: ICD-10-PCS | Mod: ANES,,, | Performed by: ANESTHESIOLOGY

## 2022-04-22 PROCEDURE — D9220A PRA ANESTHESIA: Mod: ANES,,, | Performed by: ANESTHESIOLOGY

## 2022-04-22 PROCEDURE — 82150 ASSAY OF AMYLASE: CPT | Performed by: INTERNAL MEDICINE

## 2022-04-22 PROCEDURE — 63600175 PHARM REV CODE 636 W HCPCS: Performed by: NURSE ANESTHETIST, CERTIFIED REGISTERED

## 2022-04-22 PROCEDURE — D9220A PRA ANESTHESIA: Mod: CRNA,,, | Performed by: NURSE ANESTHETIST, CERTIFIED REGISTERED

## 2022-04-22 PROCEDURE — 82962 GLUCOSE BLOOD TEST: CPT | Performed by: INTERNAL MEDICINE

## 2022-04-22 PROCEDURE — D9220A PRA ANESTHESIA: ICD-10-PCS | Mod: CRNA,,, | Performed by: NURSE ANESTHETIST, CERTIFIED REGISTERED

## 2022-04-22 PROCEDURE — 37000008 HC ANESTHESIA 1ST 15 MINUTES: Performed by: INTERNAL MEDICINE

## 2022-04-22 PROCEDURE — 43242 PR UPGI ENDOSCOPY,FN NEEDLE BX,GUIDED: ICD-10-PCS | Mod: ,,, | Performed by: INTERNAL MEDICINE

## 2022-04-22 RX ORDER — LIDOCAINE HYDROCHLORIDE 20 MG/ML
INJECTION INTRAVENOUS
Status: DISCONTINUED | OUTPATIENT
Start: 2022-04-22 | End: 2022-04-22

## 2022-04-22 RX ORDER — PROPOFOL 10 MG/ML
VIAL (ML) INTRAVENOUS
Status: DISCONTINUED | OUTPATIENT
Start: 2022-04-22 | End: 2022-04-22

## 2022-04-22 RX ORDER — SODIUM CHLORIDE 0.9 % (FLUSH) 0.9 %
10 SYRINGE (ML) INJECTION
Status: DISCONTINUED | OUTPATIENT
Start: 2022-04-22 | End: 2022-04-22 | Stop reason: HOSPADM

## 2022-04-22 RX ORDER — PHENYLEPHRINE HCL IN 0.9% NACL 1 MG/10 ML
SYRINGE (ML) INTRAVENOUS
Status: DISCONTINUED | OUTPATIENT
Start: 2022-04-22 | End: 2022-04-22

## 2022-04-22 RX ORDER — SODIUM CHLORIDE 9 MG/ML
INJECTION, SOLUTION INTRAVENOUS CONTINUOUS
Status: DISCONTINUED | OUTPATIENT
Start: 2022-04-22 | End: 2022-04-22 | Stop reason: HOSPADM

## 2022-04-22 RX ORDER — PROPOFOL 10 MG/ML
VIAL (ML) INTRAVENOUS CONTINUOUS PRN
Status: DISCONTINUED | OUTPATIENT
Start: 2022-04-22 | End: 2022-04-22

## 2022-04-22 RX ORDER — SODIUM CHLORIDE 0.9 % (FLUSH) 0.9 %
3 SYRINGE (ML) INJECTION
Status: DISCONTINUED | OUTPATIENT
Start: 2022-04-22 | End: 2022-04-22 | Stop reason: HOSPADM

## 2022-04-22 RX ADMIN — Medication 150 MCG/KG/MIN: at 10:04

## 2022-04-22 RX ADMIN — LIDOCAINE HYDROCHLORIDE 60 MG: 20 INJECTION, SOLUTION INTRAVENOUS at 10:04

## 2022-04-22 RX ADMIN — PROPOFOL 50 MG: 10 INJECTION, EMULSION INTRAVENOUS at 10:04

## 2022-04-22 RX ADMIN — SODIUM CHLORIDE: 0.9 INJECTION, SOLUTION INTRAVENOUS at 10:04

## 2022-04-22 RX ADMIN — Medication 100 MCG: at 11:04

## 2022-04-22 RX ADMIN — GLYCOPYRROLATE 0.2 MG: 0.2 INJECTION, SOLUTION INTRAMUSCULAR; INTRAVITREAL at 11:04

## 2022-04-22 NOTE — ANESTHESIA POSTPROCEDURE EVALUATION
Anesthesia Post Evaluation    Patient: Sussy Costa    Procedure(s) Performed: Procedure(s) (LRB):  ULTRASOUND, UPPER GI TRACT, ENDOSCOPIC (N/A)    Final Anesthesia Type: general      Patient location during evaluation: PACU  Patient participation: Yes- Able to Participate  Level of consciousness: awake and alert and oriented  Post-procedure vital signs: reviewed and stable  Pain management: adequate  Airway patency: patent    PONV status at discharge: No PONV  Anesthetic complications: no      Cardiovascular status: blood pressure returned to baseline  Respiratory status: unassisted, room air and spontaneous ventilation  Hydration status: euvolemic  Follow-up not needed.          Vitals Value Taken Time   /73 04/22/22 1146   Temp 37 04/22/22 1156   Pulse 83 04/22/22 1155   Resp 31 04/22/22 1155   SpO2 99 % 04/22/22 1155   Vitals shown include unvalidated device data.      No case tracking events are documented in the log.      Pain/Caitie Score: No data recorded

## 2022-04-22 NOTE — H&P
History & Physical - Short Stay  Gastroenterology      SUBJECTIVE:     Procedure: EUS    Chief Complaint/Indication for Procedure: abnormal CT scan    History of Present Illness:  Patient is a 86 y.o. female presents with pancreas cyst and recent abnormal CT scan. The patient stated weight loss of 20 pounds in the last 4 months.     CT scan on 4/11/2022    Numerous pancreatic cystic lesions, increased in size and number from 04/07/2017.  For example the 1.9 cm lesion in the uncinate process on 2:82 previously measured 1.6 cm, and the 1.6 cm lesion in the pancreatic tail on 2:84 previously measured 1.0 cm.  No definite nodular enhancing components.  There is main pancreatic ductal dilatation up to 1.0 cm, new from prior.  Diffuse parenchymal atrophy.  Coarse calcifications throughout the pancreatic parenchyma, most pronounced in the pancreatic head, likely secondary to chronic pancreatitis.    Innumerable solid pulmonary nodules throughout the lung bases bilaterally, increased in size and number from 10/20/2011, concerning for pulmonary metastases.  Clinical considerations will determine the need for follow-up imaging.    PTA Medications   Medication Sig    albuterol (PROAIR HFA) 90 mcg/actuation inhaler Inhale 2 puffs into the lungs every 6 (six) hours as needed for Wheezing. Rescue    atorvastatin (LIPITOR) 20 MG tablet TAKE 1 TABLET BY MOUTH DAILY in THE evening FOR cholesterol    calcium carbonate (OS-JUNIE) 600 mg calcium (1,500 mg) Tab Take 600 mg by mouth once.    carvediloL (COREG) 6.25 MG tablet TAKE 1 TABLET BY MOUTH TWICE DAILY WITH MEALS    furosemide (LASIX) 20 MG tablet Take 1 tablet (20 mg total) by mouth once daily.    insulin aspart U-100 (NOVOLOG FLEXPEN U-100 INSULIN) 100 unit/mL (3 mL) InPn pen Take 5 units with breakfast and lunch, and take 6 units with dinner plus correction scale, max TDD 27 units    insulin detemir U-100 (LEVEMIR FLEXTOUCH U-100 INSULN) 100 unit/mL (3 mL) InPn pen Inject  "3 Units into the skin 2 (two) times daily.    irbesartan (AVAPRO) 300 MG tablet TAKE 1 TABLET BY MOUTH EVERY EVENING    magnesium oxide (MAG-OX) 400 mg (241.3 mg magnesium) tablet Take 400 mg by mouth once daily.    montelukast (SINGULAIR) 10 mg tablet TAKE 1 TABLET BY MOUTH EVERY EVENING    vitamin D (VITAMIN D3) 1000 units Tab Take 1,000 Units by mouth once daily.    azithromycin (Z-DEV) 250 MG tablet Take 1 tablet (250 mg total) by mouth once daily. Take first 2 tablets together, then 1 every day until finished.    blood sugar diagnostic Strp 1 strip by Misc.(Non-Drug; Combo Route) route 4 (four) times daily with meals and nightly.    budesonide 1 mg/2 mL NbSp SMARTSI Puff(s) Via Nebulizer Twice Daily    BUDESONIDE 1 MG/NORMAL SALINE 50 ML NASAL IRRIGATION     doxycycline (VIBRA-TABS) 100 MG tablet Take 1 tablet (100 mg total) by mouth 2 (two) times daily.    glucagon (GVOKE HYPOPEN 2-PACK) 1 mg/0.2 mL AtIn Inject 1 mg into the skin as needed (severe hypoglycemia).    lancets (ACCU-CHEK FASTCLIX LANCET DRUM) [facility] [place]LOOD GLUCOSE FOUR TIMES DAILY    latanoprost 0.005 % ophthalmic solution Inject into the eye. 1 Drops Ophthalmic Every evening    levocetirizine (XYZAL) 5 MG tablet Take 1 tablet (5 mg total) by mouth every evening.    neomycin-polymyxin-hydrocortisone (CORTISPORIN) 3.5-10,000-1 mg/mL-unit/mL-% otic suspension Use bid for nails    olopatadine (PATANOL) 0.1 % ophthalmic solution Place 1 drop into both eyes 2 (two) times daily as needed. 1 Drops Ophthalmic Twice a day     pen needle, diabetic 31 gauge x 5/16" Ndle Inject 1 each into the skin 4 (four) times daily with meals and nightly. Use with insulin pen    potassium chloride (KLOR-CON) 10 MEQ TbSR Take 1 tablet (10 mEq total) by mouth once daily.    risedronate (ACTONEL) 35 MG tablet Take 1 tablet (35 mg total) by mouth every 7 days.    WIXELA INHUB 500-50 mcg/dose DsDv diskus inhaler USE 1 INHALATION ORALLY    TWICE DAILY " INTO THE LUNGS       Review of patient's allergies indicates:   Allergen Reactions    Aspirin Other (See Comments)     Asthma    TRIAD OF NASAL POLYPS, ASA  ALLERGY AND ASTHMA.        Aspirin Other (See Comments)    Nsaids (non-steroidal anti-inflammatory drug) Other (See Comments)     AVOID DUE TO TRIAD OF ASA ALLERGY, NASAL POLYPS,AND ASTHMA    Penicillin      Other reaction(s): Rash    9/30/20: tolerates ceftriaxone        Past Medical History:   Diagnosis Date    Asthma     Cyst of pancreas     liver    Diabetes mellitus type II     Eczema of hand     Glaucoma     Hyperlipidemia     Hypertension     Lichen planus     gums    Osteoporosis     Pulmonary nodules      Past Surgical History:   Procedure Laterality Date    CATARACT EXTRACTION, BILATERAL      CHOLECYSTECTOMY      EPIDURAL STEROID INJECTION INTO LUMBAR SPINE N/A 11/8/2018    Procedure: Injection-steroid-epidural-lumbar L5-S1;  Surgeon: Nicci Osorio Jr., MD;  Location: [facility] PAIN [facility];  Service: Pain Management;  Laterality: N/A;    FUNCTIONAL ENDOSCOPIC SINUS SURGERY (FESS) USING COMPUTER-ASSISTED NAVIGATION N/A 6/17/2019    Procedure: FESS, USING COMPUTER-ASSISTED NAVIGATION;  Surgeon: Rosangela Isabel MD;  Location: [facility] OR;  Service: ENT;  Laterality: N/A;  video    HYSTERECTOMY      nasal polyps       Family History   Problem Relation Age of Onset    Heart disease Father     Heart disease Brother     Hypertension Brother      Social History     Tobacco Use    Smoking status: Never Smoker    Smokeless tobacco: Never Used   Substance Use Topics    Alcohol use: Yes     Comment: socially    Drug use: No       Review of Systems:  Constitutional: no fever or chills  Respiratory: no cough or shortness of breath  Cardiovascular: no chest pain or palpitations    OBJECTIVE:     Vital Signs (Most Recent)  Temp: 97.7 °F (36.5 °C) (04/22/22 1030)  Pulse: 86 (04/22/22 1030)  Resp: 16 (04/22/22 1030)  SpO2: 97 % (04/22/22  1030)    Physical Exam:  General: well developed, well nourished  Lungs:  normal respiratory effort  Heart: regular rate, S1, S2 normal    Laboratory  CBC: No results for input(s): WBC, RBC, HGB, HCT, PLT, MCV, MCH, MCHC in the last 168 hours.  CMP: No results for input(s): GLU, CALCIUM, ALBUMIN, PROT, NA, K, CO2, CL, BUN, CREATININE, ALKPHOS, ALT, AST, BILITOT in the last 168 hours.  Coagulation: No results for input(s): LABPROT, INR, APTT in the last 168 hours.      Diagnostic Results:      ASSESSMENT/PLAN:     Pancreas cyst  Abnormal CT scan    Plan: EUS    Anesthesia Plan: MAC    ASA Grade: ASA 3 - Patient with moderate systemic disease with functional limitations       The impression and plan was discussed in detail with the patient. All questions have been answered and the patient voices understanding of our plan at this point. The risk of the procedure was discussed in detail which includes but not limited to bleeding, infection, perforation in some cases requiring surgery with its spectrum of complications.

## 2022-04-22 NOTE — BRIEF OP NOTE
Discharge Summary/Instructions after an Endoscopic Procedure    Patient Name: Sussy Costa  Patient MRN: [id number]  Patient YOB: 1935    Friday, April 22, 2022  Max Rosado MD    Dear patient,  As a result of recent federal legislation (The Federal Cures Act), you may receive lab or pathology results from your procedure in your [facility] account before your physician is able to contact you. Your physician or their representative will relay the results to you with their recommendations at their soonest availability.  Thank you,    RESTRICTIONS:  During your procedure today, you received medications for sedation.  These medications may affect your judgment, balance and coordination.  Therefore, for 24 hours, you have the following restrictions:     - DO NOT drive a car, operate machinery, make legal/financial decisions, sign important papers or drink alcohol.      ACTIVITY:  Today: no heavy lifting, straining or running due to procedural sedation/anesthesia.  The following day: return to full activity including work.    DIET:  Eat and drink normally unless instructed otherwise.     TREATMENT FOR COMMON SIDE EFFECTS:  - Mild abdominal pain, nausea, belching, bloating or excessive gas:  rest, eat lightly and use a heating pad.  - Sore Throat: treat with throat lozenges and/or gargle with warm salt water.  - Because air was used during the procedure, expelling large amounts of air from your rectum or belching is normal.  - If a bowel prep was taken, you may not have a bowel movement for 1-3 days.  This is normal.      SYMPTOMS TO WATCH FOR AND REPORT TO YOUR PHYSICIAN:  1. Abdominal pain or bloating, other than gas cramps.  2. Chest pain.  3. Back pain.  4. Signs of infection such as: chills or fever occurring within 24 hours after the procedure.  5. Rectal bleeding, which would show as bright red, maroon, or black stools. (A tablespoon of blood from the rectum is not serious, especially if hemorrhoids are  present.)  6. Vomiting.  7. Weakness or dizziness.      GO DIRECTLY TO THE NEAREST EMERGENCY ROOM IF YOU HAVE ANY OF THE FOLLOWING:     Difficulty breathing              Chills and/or fever over 101 F   Persistent vomiting and/or vomiting blood   Severe abdominal pain   Severe chest pain   Black, tarry stools   Bleeding- more than one tablespoon   Any other symptom or condition that you feel may need urgent attention    Your doctor recommends these additional instructions:  If any biopsies were taken, your doctors clinic will contact you in 1 to 2 weeks with any results.    - Discharge patient to home (ambulatory).   - Perform CT scan (computed tomography) of the chest with contrast at appointment to be scheduled.   - Await cytology results and await tumor markers.   - Return to endoscopist at appointment to be scheduled.    For questions, problems or results please call your physician - Max Rosado MD at Work:  [phone].    [facility], EMERGENCY ROOM PHONE NUMBER: [phone]    IF A COMPLICATION OR EMERGENCY SITUATION ARISES AND YOU ARE UNABLE TO REACH YOUR PHYSICIAN - GO DIRECTLY TO THE EMERGENCY ROOM.

## 2022-04-22 NOTE — PLAN OF CARE
Adult Patient Discharge. ARTHUR/PADSS Scoring met. PO Liquids tolerated prior to discharge. Education provided.

## 2022-04-22 NOTE — ANESTHESIA PREPROCEDURE EVALUATION
04/22/2022  Sussy Costa is a 86 y.o., female.      Pre-op Assessment    I have reviewed the Patient Summary Reports.     I have reviewed the Nursing Notes. I have reviewed the NPO Status.   I have reviewed the Medications.     Review of Systems  Anesthesia Hx:  No problems with previous Anesthesia  History of prior surgery of interest to airway management or planning: Denies Family Hx of Anesthesia complications.   Denies Personal Hx of Anesthesia complications.   Hematology/Oncology:  Hematology Normal   Oncology Normal     EENT/Dental:EENT/Dental Normal   Cardiovascular:   Exercise tolerance: good Hypertension ECG has been reviewed.    Pulmonary:   Asthma    Renal/:  Renal/ Normal     Hepatic/GI:  Hepatic/GI Normal    Musculoskeletal:   Arthritis     Neurological:  Neurology Normal    Endocrine:   Diabetes, type 2    Dermatological:  Skin Normal    Psych:  Psychiatric Normal           Physical Exam  General: Well nourished, Cooperative, Alert and Oriented    Airway:  Mallampati: II   Mouth Opening: Normal  TM Distance: Normal  Tongue: Normal  Neck ROM: Normal ROM    Dental:  Intact        Anesthesia Plan  Type of Anesthesia, risks & benefits discussed:    Anesthesia Type: Gen Natural Airway  Intra-op Monitoring Plan: Standard ASA Monitors  Induction:  IV  Informed Consent: Informed consent signed with the Patient and all parties understand the risks and agree with anesthesia plan.  All questions answered.   ASA Score: 3  Day of Surgery Review of History & Physical: H&P Update referred to the surgeon/provider.    Ready For Surgery From Anesthesia Perspective.     .

## 2022-04-22 NOTE — [employer] PATIENT INSTRUCTIONS
Discharge Summary/Instructions after an Endoscopic Procedure  Patient Name: Sussy Costa  Patient MRN: [id number]  Patient YOB: 1935  Friday, April 22, 2022  Max Rosado MD  Dear patient,  As a result of recent federal legislation (The Federal Cures Act), you may   receive lab or pathology results from your procedure in your [facility]   account before your physician is able to contact you. Your physician or   their representative will relay the results to you with their   recommendations at their soonest availability.  Thank you,  RESTRICTIONS:  During your procedure today, you received medications for sedation.  These   medications may affect your judgment, balance and coordination.  Therefore,   for 24 hours, you have the following restrictions:   - DO NOT drive a car, operate machinery, make legal/financial decisions,   sign important papers or drink alcohol.    ACTIVITY:  Today: no heavy lifting, straining or running due to procedural   sedation/anesthesia.  The following day: return to full activity including work.  DIET:  Eat and drink normally unless instructed otherwise.     TREATMENT FOR COMMON SIDE EFFECTS:  - Mild abdominal pain, nausea, belching, bloating or excessive gas:  rest,   eat lightly and use a heating pad.  - Sore Throat: treat with throat lozenges and/or gargle with warm salt   water.  - Because air was used during the procedure, expelling large amounts of air   from your rectum or belching is normal.  - If a bowel prep was taken, you may not have a bowel movement for 1-3 days.    This is normal.  SYMPTOMS TO WATCH FOR AND REPORT TO YOUR PHYSICIAN:  1. Abdominal pain or bloating, other than gas cramps.  2. Chest pain.  3. Back pain.  4. Signs of infection such as: chills or fever occurring within 24 hours   after the procedure.  5. Rectal bleeding, which would show as bright red, maroon, or black stools.   (A tablespoon of blood from the rectum is not serious, especially if    hemorrhoids are present.)  6. Vomiting.  7. Weakness or dizziness.  GO DIRECTLY TO THE NEAREST EMERGENCY ROOM IF YOU HAVE ANY OF THE FOLLOWING:      Difficulty breathing              Chills and/or fever over 101 F   Persistent vomiting and/or vomiting blood   Severe abdominal pain   Severe chest pain   Black, tarry stools   Bleeding- more than one tablespoon   Any other symptom or condition that you feel may need urgent attention  Your doctor recommends these additional instructions:  If any biopsies were taken, your doctors clinic will contact you in 1 to 2   weeks with any results.  - Discharge patient to home (ambulatory).   - Perform CT scan (computed tomography) of the chest with contrast at   appointment to be scheduled.   - Await cytology results and await tumor markers.   - Return to endoscopist at appointment to be scheduled.  For questions, problems or results please call your physician - Max Rosado MD at Work:  [phone].  [facility], EMERGENCY ROOM PHONE NUMBER: [phone]  IF A COMPLICATION OR EMERGENCY SITUATION ARISES AND YOU ARE UNABLE TO REACH   YOUR PHYSICIAN - GO DIRECTLY TO THE EMERGENCY ROOM.  Max Rosado MD  4/22/2022 12:04:44 PM  This report has been verified and signed electronically.  Dear patient,  As a result of recent federal legislation (The Federal Cures Act), you may   receive lab or pathology results from your procedure in your [facility]   account before your physician is able to contact you. Your physician or   their representative will relay the results to you with their   recommendations at their soonest availability.  Thank you,  [employer]

## 2022-04-22 NOTE — TRANSFER OF CARE
"Anesthesia Transfer of Care Note    Patient: Sussy Costa    Procedure(s) Performed: Procedure(s) (LRB):  ULTRASOUND, UPPER GI TRACT, ENDOSCOPIC (N/A)    Patient location: [facility]    Anesthesia Type: general    Transport from OR: Transported from OR on room air with adequate spontaneous ventilation    Post pain: adequate analgesia    Post assessment: no apparent anesthetic complications and tolerated procedure well    Post vital signs: stable    Level of consciousness: sedated    Nausea/Vomiting: no nausea/vomiting    Complications: none    Transfer of care protocol was followed      Last vitals:   Visit Vitals  /78 (Patient Position: Lying)   Pulse 86   Temp 36.5 °C (97.7 °F) (Temporal)   Resp 16   Ht 5' 5" (1.651 m)   Wt 47.2 kg (104 lb)   SpO2 97%   Breastfeeding No   BMI 17.31 kg/m²     "

## 2022-04-25 ENCOUNTER — PATIENT MESSAGE (OUTPATIENT)
Dept: ENDOSCOPY | Facility: HOSPITAL | Age: 87
End: 2022-04-25
Payer: MEDICARE

## 2022-04-25 ENCOUNTER — TELEPHONE (OUTPATIENT)
Dept: ENDOSCOPY | Facility: HOSPITAL | Age: 87
End: 2022-04-25
Payer: MEDICARE

## 2022-04-26 LAB
AMYLASE, PANCREATIC FLUID: NORMAL U/L
BDY SITE: NORMAL
BDY SITE: NORMAL
CEA FLD-MCNC: 1792 NG/ML

## 2022-05-06 ENCOUNTER — HOSPITAL ENCOUNTER (OUTPATIENT)
Dept: RADIOLOGY | Facility: HOSPITAL | Age: 87
Discharge: HOME | End: 2022-05-06
Attending: INTERNAL MEDICINE
Payer: MEDICARE

## 2022-05-06 DIAGNOSIS — R93.89 ABNORMAL FINDING ON IMAGING: ICD-10-CM

## 2022-05-06 PROCEDURE — 25500020 PHARM REV CODE 255: Performed by: INTERNAL MEDICINE

## 2022-05-06 PROCEDURE — 71260 CT CHEST WITH CONTRAST: ICD-10-PCS | Mod: 26,,, | Performed by: STUDENT IN AN ORGANIZED HEALTH CARE EDUCATION/TRAINING PROGRAM

## 2022-05-06 PROCEDURE — 71260 CT THORAX DX C+: CPT | Mod: 26,,, | Performed by: STUDENT IN AN ORGANIZED HEALTH CARE EDUCATION/TRAINING PROGRAM

## 2022-05-06 PROCEDURE — 71260 CT THORAX DX C+: CPT | Mod: TC

## 2022-05-06 RX ADMIN — IOHEXOL 75 ML: 350 INJECTION, SOLUTION INTRAVENOUS at 05:05

## 2022-05-09 ENCOUNTER — TELEPHONE (OUTPATIENT)
Dept: PULMONOLOGY | Facility: CLINIC | Age: 87
End: 2022-05-09
Payer: MEDICARE

## 2022-05-09 NOTE — TELEPHONE ENCOUNTER
I received a [employer] message from Dr Rosado's about a abnormal ct of the chest on Mrs Costa. She last saw Dr Peguero in 2015. I offered a Wednesday 9-11-22 appointment and Mrs Costa accepted. Casandra Sanders LPN

## 2022-05-09 NOTE — TELEPHONE ENCOUNTER
----- Message from Anayeli Lockhart MA sent at 5/9/2022  4:00 PM CDT -----  Can you get this patient scheduled as soon as possible?  ----- Message -----  From: Max Rosado MD  Sent: 5/9/2022   2:57 PM CDT  To: Anayeli Lockhart MA    Spoke with the patient and CT scan result discussed. Denied fever or SOB unless significant activity. Stated some cough with mucus production. Need urgent Pulmonary consult.  Thanks,  Max Rosado MD

## 2022-05-11 ENCOUNTER — OFFICE VISIT (OUTPATIENT)
Dept: PULMONOLOGY | Facility: CLINIC | Age: 87
End: 2022-05-11
Payer: MEDICARE

## 2022-05-11 ENCOUNTER — HOSPITAL ENCOUNTER (OUTPATIENT)
Dept: PULMONOLOGY | Facility: CLINIC | Age: 87
Discharge: HOME | End: 2022-05-11
Payer: MEDICARE

## 2022-05-11 VITALS
BODY MASS INDEX: 16.38 KG/M2 | HEART RATE: 83 BPM | OXYGEN SATURATION: 97 % | DIASTOLIC BLOOD PRESSURE: 76 MMHG | HEIGHT: 65 IN | SYSTOLIC BLOOD PRESSURE: 136 MMHG | WEIGHT: 98.31 LBS

## 2022-05-11 DIAGNOSIS — J18.9 PNEUMONIA OF LEFT LOWER LOBE DUE TO INFECTIOUS ORGANISM: Primary | ICD-10-CM

## 2022-05-11 DIAGNOSIS — R06.02 SHORTNESS OF BREATH: Primary | ICD-10-CM

## 2022-05-11 DIAGNOSIS — J45.901 ASTHMA WITH ACUTE EXACERBATION, UNSPECIFIED ASTHMA SEVERITY, UNSPECIFIED WHETHER PERSISTENT: Primary | ICD-10-CM

## 2022-05-11 DIAGNOSIS — J45.30 MILD PERSISTENT ASTHMA, UNSPECIFIED WHETHER COMPLICATED: ICD-10-CM

## 2022-05-11 DIAGNOSIS — J45.901 ASTHMA WITH ACUTE EXACERBATION, UNSPECIFIED ASTHMA SEVERITY, UNSPECIFIED WHETHER PERSISTENT: ICD-10-CM

## 2022-05-11 LAB
FEF 25 75 LLN: 0.57
FEF 25 75 PRE REF: 16.8 %
FEF 25 75 REF: 1.44
FEV05 LLN: 0.69
FEV05 REF: 1.54
FEV1 FVC LLN: 61
FEV1 FVC PRE REF: 70.6 %
FEV1 FVC REF: 76
FEV1 LLN: 1.27
FEV1 PRE REF: 39.1 %
FEV1 REF: 1.86
FVC LLN: 1.7
FVC PRE REF: 54.4 %
FVC REF: 2.48
PEF LLN: 2.68
PEF PRE REF: 57.9 %
PEF REF: 4.44
PHYSICIAN COMMENT: ABNORMAL
PRE FEF 25 75: 0.24 L/S (ref 0.57–2.81)
PRE FET 100: 8.95 SEC
PRE FEV05 REF: 35.3 %
PRE FEV1 FVC: 53.9 % (ref 60.95–89.75)
PRE FEV1: 0.73 L (ref 1.27–2.42)
PRE FEV5: 0.54 L (ref 0.69–2.4)
PRE FVC: 1.35 L (ref 1.7–3.31)
PRE PEF: 2.57 L/S (ref 2.68–6.21)

## 2022-05-11 PROCEDURE — 99999 PR PBB SHADOW E&M-EST. PATIENT-LVL IV: ICD-10-PCS | Mod: PBBFAC,,, | Performed by: INTERNAL MEDICINE

## 2022-05-11 PROCEDURE — 99999 PR PBB SHADOW E&M-EST. PATIENT-LVL IV: CPT | Mod: PBBFAC,,, | Performed by: INTERNAL MEDICINE

## 2022-05-11 PROCEDURE — 99214 OFFICE O/P EST MOD 30 MIN: CPT | Mod: PBBFAC | Performed by: INTERNAL MEDICINE

## 2022-05-11 PROCEDURE — 99204 PR OFFICE/OUTPT VISIT, NEW, LEVL IV, 45-59 MIN: ICD-10-PCS | Mod: S$PBB,,, | Performed by: INTERNAL MEDICINE

## 2022-05-11 PROCEDURE — 99204 OFFICE O/P NEW MOD 45 MIN: CPT | Mod: S$PBB,,, | Performed by: INTERNAL MEDICINE

## 2022-05-11 NOTE — H&P (VIEW-ONLY)
Subjective:      Patient ID: Sussy Costa is a 86 y.o. female.    Chief Complaint: Abnormal Ct Scan, Shortness of Breath, and Asthma    HPI  85 yo non smoking who I have seen in the past for mild asthma and cough.  She is referred for assessment of an abnormal , has a recent abdominal CT and it is noted that the posterior basilar segment  of the left lung is airless. 5/6 and had an earlier CT on 4 11 that showed full expanded left lower lobe.    The patient denies cough no pleuritc chest pain  In no breathing distress. Has insulin dependent and recently had a upper EUS  4/22 for assessment  Of pancreatic cysts.    No difficulty with the procedure.      Review of Systems   Constitutional: Negative.    HENT: Negative.    Eyes: Negative.    Respiratory: Negative for cough, sputum production, chest tightness, shortness of breath, wheezing, pleurisy and dyspnea on extertion.         Abnormal CT that shows significant volume loss of the posterior basilar segment of the LLL   Hx of mild persistent asthma.  Takes advair and rescue with albuterol inhaler. singulair.   Cardiovascular: Negative.    Genitourinary: Negative.    Endocrine: Insulin dependent diabetes   Musculoskeletal: Negative.    Skin: Negative.    Gastrointestinal: Negative.         Recent Upper EGD for assessment of pancreatic cysts.   Neurological: Negative.    Psychiatric/Behavioral: Negative.      Objective:     Physical Exam  Vitals and nursing note reviewed.   Constitutional:       General: She is not in acute distress.     Appearance: She is well-developed.      Comments: In no distress  Sa02: 97%   HENT:      Head: Normocephalic and atraumatic.      Right Ear: External ear normal.      Left Ear: External ear normal.      Nose: Nose normal.   Eyes:      Conjunctiva/sclera: Conjunctivae normal.      Pupils: Pupils are equal, round, and reactive to light.   Neck:      Thyroid: No thyromegaly.      Vascular: No JVD.   Cardiovascular:      Rate and  Rhythm: Normal rate and regular rhythm.      Heart sounds: Normal heart sounds. No murmur heard.    No gallop.   Pulmonary:      Effort: No respiratory distress.      Breath sounds: Normal breath sounds. No stridor. No wheezing or rales.      Comments: Decreased breath sounds at left lung base.  No rales or wheezes    PFT's FVC: 1.35 L 54%  And Fev-1: 0.73 L  54%  Chest:      Chest wall: No tenderness.   Abdominal:      General: Bowel sounds are normal. There is no distension.      Palpations: Abdomen is soft. There is no mass.      Tenderness: There is no abdominal tenderness. There is no guarding or rebound.   Musculoskeletal:         General: Normal range of motion.      Cervical back: Normal range of motion and neck supple.   Lymphadenopathy:      Cervical: No cervical adenopathy.   Skin:     General: Skin is warm and dry.      Findings: No rash.   Neurological:      Mental Status: She is alert and oriented to person, place, and time.      Cranial Nerves: No cranial nerve deficit.      Deep Tendon Reflexes: Reflexes are normal and symmetric. Reflexes normal.   Psychiatric:         Behavior: Behavior normal.         Thought Content: Thought content normal.         Judgment: Judgment normal.         Assessment:     1. Pneumonia of left lower lobe due to infectious organism    2. Mild persistent asthma, unspecified whether complicated      Outpatient Encounter Medications as of 2022   Medication Sig Dispense Refill    albuterol (PROAIR HFA) 90 mcg/actuation inhaler Inhale 2 puffs into the lungs every 6 (six) hours as needed for Wheezing. Rescue 18 g 3    atorvastatin (LIPITOR) 20 MG tablet TAKE 1 TABLET BY MOUTH DAILY in THE evening FOR cholesterol 90 tablet 0    blood sugar diagnostic Strp 1 strip by Misc.(Non-Drug; Combo Route) route 4 (four) times daily with meals and nightly. 200 strip 11    budesonide 1 mg/2 mL NbSp SMARTSI Puff(s) Via Nebulizer Twice Daily      BUDESONIDE 1 MG/NORMAL SALINE 50 ML  "NASAL IRRIGATION       calcium carbonate (OS-JUNIE) 600 mg calcium (1,500 mg) Tab Take 600 mg by mouth once.      carvediloL (COREG) 6.25 MG tablet TAKE 1 TABLET BY MOUTH TWICE DAILY WITH MEALS 60 tablet 3    furosemide (LASIX) 20 MG tablet Take 1 tablet (20 mg total) by mouth once daily. 7 tablet 0    glucagon (GVOKE HYPOPEN 2-PACK) 1 mg/0.2 mL AtIn Inject 1 mg into the skin as needed (severe hypoglycemia). 1 each 1    insulin aspart U-100 (NOVOLOG FLEXPEN U-100 INSULIN) 100 unit/mL (3 mL) InPn pen Take 5 units with breakfast and lunch, and take 6 units with dinner plus correction scale, max TDD 27 units 15 mL 11    insulin detemir U-100 (LEVEMIR FLEXTOUCH U-100 INSULN) 100 unit/mL (3 mL) InPn pen Inject 3 Units into the skin 2 (two) times daily. 15 mL 11    irbesartan (AVAPRO) 300 MG tablet TAKE 1 TABLET BY MOUTH EVERY EVENING 90 tablet 3    lancets (ACCU-CHEK FASTCLIX LANCET DRUM) [facility] CHECK BLOOD GLUCOSE FOUR TIMES DAILY 200 each 3    latanoprost 0.005 % ophthalmic solution Inject into the eye. 1 Drops Ophthalmic Every evening      levocetirizine (XYZAL) 5 MG tablet Take 1 tablet (5 mg total) by mouth every evening. 30 tablet 11    magnesium oxide (MAG-OX) 400 mg (241.3 mg magnesium) tablet Take 400 mg by mouth once daily.      montelukast (SINGULAIR) 10 mg tablet TAKE 1 TABLET BY MOUTH EVERY EVENING 30 tablet 6    neomycin-polymyxin-hydrocortisone (CORTISPORIN) 3.5-10,000-1 mg/mL-unit/mL-% otic suspension Use bid for nails 10 mL 6    olopatadine (PATANOL) 0.1 % ophthalmic solution Place 1 drop into both eyes 2 (two) times daily as needed. 1 Drops Ophthalmic Twice a day       pen needle, diabetic 31 gauge x 5/16" Ndle Inject 1 each into the skin 4 (four) times daily with meals and nightly. Use with insulin pen 200 each 11    potassium chloride (KLOR-CON) 10 MEQ TbSR Take 1 tablet (10 mEq total) by mouth once daily. 7 tablet 0    vitamin D (VITAMIN D3) 1000 units Tab Take 1,000 Units by mouth once " daily.      WIXELA INHUB 500-50 mcg/dose DsDv diskus inhaler USE 1 INHALATION ORALLY    TWICE DAILY INTO THE LUNGS 180 each 3    [DISCONTINUED] risedronate (ACTONEL) 35 MG tablet Take 1 tablet (35 mg total) by mouth every 7 days. 12 tablet 3     No facility-administered encounter medications on file as of 5/11/2022.     No orders of the defined types were placed in this encounter.    Plan:     Problem List Items Addressed This Visit     Pneumonia of left lower lobe due to infectious organism - Primary      Other Visit Diagnoses     Mild persistent asthma, unspecified whether complicated           Patient has  Developed volume loss of posterior segment of the LLL. Appeared normal on the CT done in early April.    Could this be retained secretions or mucus impaction.  She has NO clinical signs or symptoms of acute lung distress.  Suggest a bronchoscope to inspect and re open the bronchial segment.

## 2022-05-11 NOTE — PROGRESS NOTES
Subjective:      Patient ID: Sussy Costa is a 86 y.o. female.    Chief Complaint: Abnormal Ct Scan, Shortness of Breath, and Asthma    HPI  87 yo non smoking who I have seen in the past for mild asthma and cough.  She is referred for assessment of an abnormal , has a recent abdominal CT and it is noted that the posterior basilar segment  of the left lung is airless. 5/6 and had an earlier CT on 4 11 that showed full expanded left lower lobe.    The patient denies cough no pleuritc chest pain  In no breathing distress. Has insulin dependent and recently had a upper EUS  4/22 for assessment  Of pancreatic cysts.    No difficulty with the procedure.      Review of Systems   Constitutional: Negative.    HENT: Negative.    Eyes: Negative.    Respiratory: Negative for cough, sputum production, chest tightness, shortness of breath, wheezing, pleurisy and dyspnea on extertion.         Abnormal CT that shows significant volume loss of the posterior basilar segment of the LLL   Hx of mild persistent asthma.  Takes advair and rescue with albuterol inhaler. singulair.   Cardiovascular: Negative.    Genitourinary: Negative.    Endocrine: Insulin dependent diabetes   Musculoskeletal: Negative.    Skin: Negative.    Gastrointestinal: Negative.         Recent Upper EGD for assessment of pancreatic cysts.   Neurological: Negative.    Psychiatric/Behavioral: Negative.      Objective:     Physical Exam  Vitals and nursing note reviewed.   Constitutional:       General: She is not in acute distress.     Appearance: She is well-developed.      Comments: In no distress  Sa02: 97%   HENT:      Head: Normocephalic and atraumatic.      Right Ear: External ear normal.      Left Ear: External ear normal.      Nose: Nose normal.   Eyes:      Conjunctiva/sclera: Conjunctivae normal.      Pupils: Pupils are equal, round, and reactive to light.   Neck:      Thyroid: No thyromegaly.      Vascular: No JVD.   Cardiovascular:      Rate and  Rhythm: Normal rate and regular rhythm.      Heart sounds: Normal heart sounds. No murmur heard.    No gallop.   Pulmonary:      Effort: No respiratory distress.      Breath sounds: Normal breath sounds. No stridor. No wheezing or rales.      Comments: Decreased breath sounds at left lung base.  No rales or wheezes    PFT's FVC: 1.35 L 54%  And Fev-1: 0.73 L  54%  Chest:      Chest wall: No tenderness.   Abdominal:      General: Bowel sounds are normal. There is no distension.      Palpations: Abdomen is soft. There is no mass.      Tenderness: There is no abdominal tenderness. There is no guarding or rebound.   Musculoskeletal:         General: Normal range of motion.      Cervical back: Normal range of motion and neck supple.   Lymphadenopathy:      Cervical: No cervical adenopathy.   Skin:     General: Skin is warm and dry.      Findings: No rash.   Neurological:      Mental Status: She is alert and oriented to person, place, and time.      Cranial Nerves: No cranial nerve deficit.      Deep Tendon Reflexes: Reflexes are normal and symmetric. Reflexes normal.   Psychiatric:         Behavior: Behavior normal.         Thought Content: Thought content normal.         Judgment: Judgment normal.         Assessment:     1. Pneumonia of left lower lobe due to infectious organism    2. Mild persistent asthma, unspecified whether complicated      Outpatient Encounter Medications as of 2022   Medication Sig Dispense Refill    albuterol (PROAIR HFA) 90 mcg/actuation inhaler Inhale 2 puffs into the lungs every 6 (six) hours as needed for Wheezing. Rescue 18 g 3    atorvastatin (LIPITOR) 20 MG tablet TAKE 1 TABLET BY MOUTH DAILY in THE evening FOR cholesterol 90 tablet 0    blood sugar diagnostic Strp 1 strip by Misc.(Non-Drug; Combo Route) route 4 (four) times daily with meals and nightly. 200 strip 11    budesonide 1 mg/2 mL NbSp SMARTSI Puff(s) Via Nebulizer Twice Daily      BUDESONIDE 1 MG/NORMAL SALINE 50 ML  "NASAL IRRIGATION       calcium carbonate (OS-JUNIE) 600 mg calcium (1,500 mg) Tab Take 600 mg by mouth once.      carvediloL (COREG) 6.25 MG tablet TAKE 1 TABLET BY MOUTH TWICE DAILY WITH MEALS 60 tablet 3    furosemide (LASIX) 20 MG tablet Take 1 tablet (20 mg total) by mouth once daily. 7 tablet 0    glucagon (GVOKE HYPOPEN 2-PACK) 1 mg/0.2 mL AtIn Inject 1 mg into the skin as needed (severe hypoglycemia). 1 each 1    insulin aspart U-100 (NOVOLOG FLEXPEN U-100 INSULIN) 100 unit/mL (3 mL) InPn pen Take 5 units with breakfast and lunch, and take 6 units with dinner plus correction scale, max TDD 27 units 15 mL 11    insulin detemir U-100 (LEVEMIR FLEXTOUCH U-100 INSULN) 100 unit/mL (3 mL) InPn pen Inject 3 Units into the skin 2 (two) times daily. 15 mL 11    irbesartan (AVAPRO) 300 MG tablet TAKE 1 TABLET BY MOUTH EVERY EVENING 90 tablet 3    lancets (ACCU-CHEK FASTCLIX LANCET DRUM) [facility] CHECK BLOOD GLUCOSE FOUR TIMES DAILY 200 each 3    latanoprost 0.005 % ophthalmic solution Inject into the eye. 1 Drops Ophthalmic Every evening      levocetirizine (XYZAL) 5 MG tablet Take 1 tablet (5 mg total) by mouth every evening. 30 tablet 11    magnesium oxide (MAG-OX) 400 mg (241.3 mg magnesium) tablet Take 400 mg by mouth once daily.      montelukast (SINGULAIR) 10 mg tablet TAKE 1 TABLET BY MOUTH EVERY EVENING 30 tablet 6    neomycin-polymyxin-hydrocortisone (CORTISPORIN) 3.5-10,000-1 mg/mL-unit/mL-% otic suspension Use bid for nails 10 mL 6    olopatadine (PATANOL) 0.1 % ophthalmic solution Place 1 drop into both eyes 2 (two) times daily as needed. 1 Drops Ophthalmic Twice a day       pen needle, diabetic 31 gauge x 5/16" Ndle Inject 1 each into the skin 4 (four) times daily with meals and nightly. Use with insulin pen 200 each 11    potassium chloride (KLOR-CON) 10 MEQ TbSR Take 1 tablet (10 mEq total) by mouth once daily. 7 tablet 0    vitamin D (VITAMIN D3) 1000 units Tab Take 1,000 Units by mouth once " daily.      WIXELA INHUB 500-50 mcg/dose DsDv diskus inhaler USE 1 INHALATION ORALLY    TWICE DAILY INTO THE LUNGS 180 each 3    [DISCONTINUED] risedronate (ACTONEL) 35 MG tablet Take 1 tablet (35 mg total) by mouth every 7 days. 12 tablet 3     No facility-administered encounter medications on file as of 5/11/2022.     No orders of the defined types were placed in this encounter.    Plan:     Problem List Items Addressed This Visit     Pneumonia of left lower lobe due to infectious organism - Primary      Other Visit Diagnoses     Mild persistent asthma, unspecified whether complicated           Patient has  Developed volume loss of posterior segment of the LLL. Appeared normal on the CT done in early April.    Could this be retained secretions or mucus impaction.  She has NO clinical signs or symptoms of acute lung distress.  Suggest a bronchoscope to inspect and re open the bronchial segment.

## 2022-05-13 ENCOUNTER — TELEPHONE (OUTPATIENT)
Dept: PULMONOLOGY | Facility: CLINIC | Age: 87
End: 2022-05-13
Payer: MEDICARE

## 2022-05-13 ENCOUNTER — HOSPITAL ENCOUNTER (OUTPATIENT)
Facility: HOSPITAL | Age: 87
Discharge: HOME | End: 2022-05-13
Attending: INTERNAL MEDICINE | Admitting: INTERNAL MEDICINE
Payer: MEDICARE

## 2022-05-13 VITALS
DIASTOLIC BLOOD PRESSURE: 67 MMHG | BODY MASS INDEX: 16.16 KG/M2 | WEIGHT: 97 LBS | RESPIRATION RATE: 18 BRPM | HEIGHT: 65 IN | SYSTOLIC BLOOD PRESSURE: 159 MMHG | TEMPERATURE: 97 F | OXYGEN SATURATION: 92 % | HEART RATE: 75 BPM

## 2022-05-13 DIAGNOSIS — J18.9 PNEUMONIA OF RIGHT MIDDLE LOBE DUE TO INFECTIOUS ORGANISM: Primary | ICD-10-CM

## 2022-05-13 DIAGNOSIS — R06.02 SHORTNESS OF BREATH: ICD-10-CM

## 2022-05-13 LAB — POCT GLUCOSE: 324 MG/DL (ref 70–110)

## 2022-05-13 PROCEDURE — 82962 GLUCOSE BLOOD TEST: CPT

## 2022-05-13 PROCEDURE — 31622 PR BRONCHOSCOPY,DIAGNOSTIC: ICD-10-PCS | Mod: ,,, | Performed by: INTERNAL MEDICINE

## 2022-05-13 PROCEDURE — 87185 SC STD ENZYME DETCJ PER NZM: CPT | Performed by: EMERGENCY MEDICINE

## 2022-05-13 PROCEDURE — 87206 SMEAR FLUORESCENT/ACID STAI: CPT | Performed by: EMERGENCY MEDICINE

## 2022-05-13 PROCEDURE — 63600175 PHARM REV CODE 636 W HCPCS: Performed by: INTERNAL MEDICINE

## 2022-05-13 PROCEDURE — 87186 SC STD MICRODIL/AGAR DIL: CPT | Performed by: EMERGENCY MEDICINE

## 2022-05-13 PROCEDURE — 25000003 PHARM REV CODE 250: Performed by: INTERNAL MEDICINE

## 2022-05-13 PROCEDURE — 87116 MYCOBACTERIA CULTURE: CPT | Performed by: EMERGENCY MEDICINE

## 2022-05-13 PROCEDURE — 99152 MOD SED SAME PHYS/QHP 5/>YRS: CPT | Performed by: INTERNAL MEDICINE

## 2022-05-13 PROCEDURE — 87015 SPECIMEN INFECT AGNT CONCNTJ: CPT | Performed by: EMERGENCY MEDICINE

## 2022-05-13 PROCEDURE — 31622 DX BRONCHOSCOPE/WASH: CPT | Mod: ,,, | Performed by: INTERNAL MEDICINE

## 2022-05-13 PROCEDURE — 87070 CULTURE OTHR SPECIMN AEROBIC: CPT | Performed by: EMERGENCY MEDICINE

## 2022-05-13 PROCEDURE — 87077 CULTURE AEROBIC IDENTIFY: CPT | Performed by: EMERGENCY MEDICINE

## 2022-05-13 PROCEDURE — 87205 SMEAR GRAM STAIN: CPT | Performed by: EMERGENCY MEDICINE

## 2022-05-13 PROCEDURE — 31624 DX BRONCHOSCOPE/LAVAGE: CPT | Performed by: INTERNAL MEDICINE

## 2022-05-13 PROCEDURE — 99153 MOD SED SAME PHYS/QHP EA: CPT | Performed by: INTERNAL MEDICINE

## 2022-05-13 PROCEDURE — 31622 DX BRONCHOSCOPE/WASH: CPT | Performed by: INTERNAL MEDICINE

## 2022-05-13 RX ORDER — LIDOCAINE HYDROCHLORIDE 20 MG/ML
INJECTION, SOLUTION INFILTRATION; PERINEURAL CODE/TRAUMA/SEDATION MEDICATION
Status: COMPLETED | OUTPATIENT
Start: 2022-05-13 | End: 2022-05-13

## 2022-05-13 RX ORDER — MIDAZOLAM HYDROCHLORIDE 5 MG/ML
INJECTION INTRAMUSCULAR; INTRAVENOUS CODE/TRAUMA/SEDATION MEDICATION
Status: COMPLETED | OUTPATIENT
Start: 2022-05-13 | End: 2022-05-13

## 2022-05-13 RX ORDER — FENTANYL CITRATE 50 UG/ML
INJECTION, SOLUTION INTRAMUSCULAR; INTRAVENOUS CODE/TRAUMA/SEDATION MEDICATION
Status: COMPLETED | OUTPATIENT
Start: 2022-05-13 | End: 2022-05-13

## 2022-05-13 RX ORDER — LIDOCAINE HYDROCHLORIDE 10 MG/ML
INJECTION INFILTRATION; PERINEURAL CODE/TRAUMA/SEDATION MEDICATION
Status: COMPLETED | OUTPATIENT
Start: 2022-05-13 | End: 2022-05-13

## 2022-05-13 RX ADMIN — LIDOCAINE HYDROCHLORIDE 8 ML: 10 INJECTION, SOLUTION INFILTRATION; PERINEURAL at 08:05

## 2022-05-13 RX ADMIN — LIDOCAINE HYDROCHLORIDE 6 ML: 20 INJECTION, SOLUTION INFILTRATION; PERINEURAL at 08:05

## 2022-05-13 RX ADMIN — FENTANYL CITRATE 25 MCG: 50 INJECTION, SOLUTION INTRAMUSCULAR; INTRAVENOUS at 08:05

## 2022-05-13 RX ADMIN — TOPICAL ANESTHETIC 0.5 ML: 200 SPRAY DENTAL; PERIODONTAL at 08:05

## 2022-05-13 RX ADMIN — MIDAZOLAM 1 MG: 5 INJECTION INTRAMUSCULAR; INTRAVENOUS at 08:05

## 2022-05-13 NOTE — PLAN OF CARE
Discharge insturctions given tp pt and spouse at bedside. Pt tolerated well; bleeding controlled with guazw and coband. Pt VSS. Pt questions answered and patient verbalizes instructions. Pt dressed per self. wheelchair requested. Pt  ;left for to get the car. Will continue to monitor. Pt tolerated 120 ml of apple juice by mouth. Denies any nausea or vomiting.

## 2022-05-13 NOTE — SEDATION DOCUMENTATION
H and P updated-yes, patient placed on cardiac monitor, anesthesia Plan:  Conscious sedation, ASA verified-yes, Airway exam performed-yes, Personal or Family history of anesthesia complications-No  Consent signed and witnessed, Alba Peck RN

## 2022-05-13 NOTE — PLAN OF CARE
Pt arrived to recovery dosc 39 per Pulmonology team. Pt attached to bedside monitor. Pt VSS. Bedside report received. Pt AAOx 3. Pt denies any pain . Pt has a cough present. Pt has orders to remain NPO until  0935. Pt IV access saline locked and free of redness and swelling. Will continue to monitor.

## 2022-05-13 NOTE — PROGRESS NOTES
Pts . Pulmonary lab called and notified. Pulm lab staff aware and verbalized they will call back after notifying the pulm fellow with any potential orders.

## 2022-05-13 NOTE — SEDATION DOCUMENTATION
Specimens obtained during Bronchoscopy:  Bronchial wash.  Verbal report given to  to include documentation charted in procedural sedation documentation.  Patient to be NPO 1 hour post procedure and place in PO tolerance at 0940.  Moderate concious sedation was performed and cardiorespiratory functions were monitored the entire procedure by Alba Peck RN.  Sedation began at 0824  and concluded at 0850.  The patient tolerated the procedure well.  Alba Peck RN

## 2022-05-13 NOTE — INTERVAL H&P NOTE
The patient has been examined and the H&P has been reviewed:    I concur with the findings and no changes have occurred since H&P was written.    Procedure risks, benefits and alternative options discussed and understood by patient/family.    C/o Chronic cough for months.    A/P  Procedure with bronchoscopy, R/B/A explained in detail. Ample time was given to ask questions. All questions answered to the best of my ability and to patient and 's satisfaction.       There are no hospital problems to display for this patient.

## 2022-05-16 LAB
BACTERIA SPEC AEROBE CULT: ABNORMAL
GRAM STN SPEC: ABNORMAL

## 2022-05-17 ENCOUNTER — TELEPHONE (OUTPATIENT)
Dept: PULMONOLOGY | Facility: CLINIC | Age: 87
End: 2022-05-17
Payer: MEDICARE

## 2022-05-17 DIAGNOSIS — J44.1 COPD EXACERBATION: ICD-10-CM

## 2022-05-17 DIAGNOSIS — R06.2 WHEEZING: ICD-10-CM

## 2022-05-17 DIAGNOSIS — J18.9 PNEUMONIA OF LEFT LOWER LOBE DUE TO INFECTIOUS ORGANISM: Primary | ICD-10-CM

## 2022-05-17 LAB — POCT GLUCOSE: 337 MG/DL (ref 70–110)

## 2022-05-17 RX ORDER — ALBUTEROL SULFATE 90 UG/1
2 AEROSOL, METERED RESPIRATORY (INHALATION) EVERY 6 HOURS PRN
Qty: 18 G | Refills: 3 | Status: SHIPPED | OUTPATIENT
Start: 2022-05-17 | End: 2023-01-18

## 2022-05-17 RX ORDER — ALBUTEROL SULFATE 0.83 MG/ML
2.5 SOLUTION RESPIRATORY (INHALATION) EVERY 6 HOURS PRN
Qty: 60 EACH | Refills: 11 | Status: ON HOLD | OUTPATIENT
Start: 2022-05-17 | End: 2023-11-20

## 2022-05-17 NOTE — TELEPHONE ENCOUNTER
Dr Peguero asked me to call Mrs Costa and tell her to take 2 plain Mucinex twice a day and use her nebulizer with albuterol bid because he wants her to expectorate her phelgm, not suppress it. I relayed the message and told her to drink a lot of liquid also to thin out the phelgm. She said she is worn out from coughing and anxious to know her results. I told her we will call right away when DR Peguero knows. Casandra Sanders LPN

## 2022-05-17 NOTE — TELEPHONE ENCOUNTER
----- Message from Jass Johnnareji sent at 5/17/2022  4:36 PM CDT -----  Regarding: pt still coughing and spitting up    The Pt states that she is still coughing and spitting up mucus and would like to know if you could call in something or if you would do the refill for her nebulizer     The Pt states that she did call and send a msg this morning, but hasn't heard back from anyone.    The Pt didn't know when you would like to see her in the office to discuss her test results.    Pharmacy - [facility] - [place] - [address]   Phone: [phone]  Fax:  [phone]

## 2022-05-18 DIAGNOSIS — J18.9 PNEUMONIA OF LEFT LOWER LOBE DUE TO INFECTIOUS ORGANISM: ICD-10-CM

## 2022-05-18 DIAGNOSIS — J45.901 ASTHMA WITH ACUTE EXACERBATION, UNSPECIFIED ASTHMA SEVERITY, UNSPECIFIED WHETHER PERSISTENT: Primary | ICD-10-CM

## 2022-05-19 ENCOUNTER — HOSPITAL ENCOUNTER (OUTPATIENT)
Dept: RADIOLOGY | Facility: HOSPITAL | Age: 87
Discharge: HOME | End: 2022-05-19
Attending: INTERNAL MEDICINE
Payer: MEDICARE

## 2022-05-19 ENCOUNTER — OFFICE VISIT (OUTPATIENT)
Dept: PULMONOLOGY | Facility: CLINIC | Age: 87
End: 2022-05-19
Payer: MEDICARE

## 2022-05-19 ENCOUNTER — PATIENT OUTREACH (OUTPATIENT)
Dept: ADMINISTRATIVE | Facility: OTHER | Age: 87
End: 2022-05-19
Payer: MEDICARE

## 2022-05-19 VITALS
BODY MASS INDEX: 16.35 KG/M2 | OXYGEN SATURATION: 98 % | HEART RATE: 90 BPM | SYSTOLIC BLOOD PRESSURE: 118 MMHG | DIASTOLIC BLOOD PRESSURE: 70 MMHG | WEIGHT: 98.13 LBS | HEIGHT: 65 IN

## 2022-05-19 DIAGNOSIS — J18.9 PNEUMONIA OF LEFT LOWER LOBE DUE TO INFECTIOUS ORGANISM: ICD-10-CM

## 2022-05-19 DIAGNOSIS — J45.901 ASTHMA WITH ACUTE EXACERBATION, UNSPECIFIED ASTHMA SEVERITY, UNSPECIFIED WHETHER PERSISTENT: ICD-10-CM

## 2022-05-19 DIAGNOSIS — J18.9 PNEUMONIA OF LEFT LOWER LOBE DUE TO INFECTIOUS ORGANISM: Primary | ICD-10-CM

## 2022-05-19 PROCEDURE — 99214 OFFICE O/P EST MOD 30 MIN: CPT | Mod: PBBFAC,25 | Performed by: INTERNAL MEDICINE

## 2022-05-19 PROCEDURE — 99999 PR PBB SHADOW E&M-EST. PATIENT-LVL IV: ICD-10-PCS | Mod: PBBFAC,,, | Performed by: INTERNAL MEDICINE

## 2022-05-19 PROCEDURE — 71046 XR CHEST PA AND LATERAL: ICD-10-PCS | Mod: 26,,, | Performed by: RADIOLOGY

## 2022-05-19 PROCEDURE — 99214 OFFICE O/P EST MOD 30 MIN: CPT | Mod: S$PBB,,, | Performed by: INTERNAL MEDICINE

## 2022-05-19 PROCEDURE — 71046 X-RAY EXAM CHEST 2 VIEWS: CPT | Mod: TC,FY

## 2022-05-19 PROCEDURE — 99214 PR OFFICE/OUTPT VISIT, EST, LEVL IV, 30-39 MIN: ICD-10-PCS | Mod: S$PBB,,, | Performed by: INTERNAL MEDICINE

## 2022-05-19 PROCEDURE — 99999 PR PBB SHADOW E&M-EST. PATIENT-LVL IV: CPT | Mod: PBBFAC,,, | Performed by: INTERNAL MEDICINE

## 2022-05-19 PROCEDURE — 71046 X-RAY EXAM CHEST 2 VIEWS: CPT | Mod: 26,,, | Performed by: RADIOLOGY

## 2022-05-19 NOTE — PROGRESS NOTES
Health Maintenance Due   Topic Date Due    Shingles Vaccine (2 of 3) 12/09/2015    Diabetes Urine Screening  07/26/2020    Lipid Panel  07/26/2020    Foot Exam  02/24/2022    COVID-19 Vaccine (4 - Booster for Moderna series) 04/03/2022     Updates were requested from care everywhere.  Chart was reviewed for overdue Proactive [facility] Encounters (ADAN) topics (CRS, Breast Cancer Screening, Eye exam)  Health Maintenance has been updated.  LINKS immunization registry triggered.  Immunizations were reconciled.

## 2022-05-19 NOTE — PROGRESS NOTES
Subjective:      Patient ID: Sussy Costa is a 86 y.o. female.    Chief Complaint: Abnormal Ct Scan    HPI  86  o female returns for follow up with a chest x-ray after having a broncosocpe last week for assessment of volume loss of the left lower lobe. She has hx of mild asthma and the concern was for mucus plugging vs aspiration pneumonia.    Dr. Cobb did the procedure and no endobronchial masses seen but had a fair amount of retained secretions. Area was washed and much of the mucus was mobilitzed      She feels well today Sa02; 98% and the left lower lobe has re expanded at least 60 %. I showed her  how to do percussion cupping after a nebulizer treatment.  After a xopenex nebulizer treatment in the office she had a very productive cough.          No flowsheet data found.  Review of Systems   Constitutional: Negative.    HENT: Negative.    Eyes: Negative.    Respiratory: Negative.         Follow up after a bronchoscope last week for loss of volume in the left lower lobe.     Hx of mild persistent asthma   Cardiovascular: Negative.    Genitourinary: Negative.    Endocrine: Type II diabetes   Musculoskeletal: Negative.    Skin: Negative.    Gastrointestinal: Negative.    Neurological: Negative.    Psychiatric/Behavioral: Negative.      Objective:     Physical Exam  Vitals and nursing note reviewed.   Constitutional:       General: She is not in acute distress.     Appearance: She is well-developed.   HENT:      Head: Normocephalic and atraumatic.      Right Ear: External ear normal.      Left Ear: External ear normal.      Nose: Nose normal.   Eyes:      Conjunctiva/sclera: Conjunctivae normal.      Pupils: Pupils are equal, round, and reactive to light.   Neck:      Thyroid: No thyromegaly.      Vascular: No JVD.   Cardiovascular:      Rate and Rhythm: Normal rate and regular rhythm.      Heart sounds: Normal heart sounds. No murmur heard.    No gallop.   Pulmonary:      Effort: No respiratory distress.       Breath sounds: Normal breath sounds. No stridor. No wheezing or rales.      Comments: Good breath sounds at the left base. No rhonchi or wheezes.  Chest:      Chest wall: No tenderness.   Abdominal:      General: Bowel sounds are normal. There is no distension.      Palpations: Abdomen is soft. There is no mass.      Tenderness: There is no abdominal tenderness. There is no guarding or rebound.   Musculoskeletal:         General: Normal range of motion.      Cervical back: Normal range of motion and neck supple.   Lymphadenopathy:      Cervical: No cervical adenopathy.   Skin:     General: Skin is warm and dry.      Findings: No rash.   Neurological:      Mental Status: She is alert and oriented to person, place, and time.      Cranial Nerves: No cranial nerve deficit.      Deep Tendon Reflexes: Reflexes are normal and symmetric. Reflexes normal.   Psychiatric:         Behavior: Behavior normal.         Thought Content: Thought content normal.         Judgment: Judgment normal.         Assessment:     1. Pneumonia of left lower lobe due to infectious organism      Outpatient Encounter Medications as of 2022   Medication Sig Dispense Refill    albuterol (PROAIR HFA) 90 mcg/actuation inhaler Inhale 2 puffs into the lungs every 6 (six) hours as needed for Wheezing. Rescue 18 g 3    albuterol (PROVENTIL) 2.5 mg /3 mL (0.083 %) nebulizer solution Take 3 mLs (2.5 mg total) by nebulization every 6 (six) hours as needed for Wheezing or Shortness of Breath. Rescue 60 each 11    atorvastatin (LIPITOR) 20 MG tablet TAKE 1 TABLET BY MOUTH DAILY in THE evening FOR cholesterol 90 tablet 0    blood sugar diagnostic Strp 1 strip by Misc.(Non-Drug; Combo Route) route 4 (four) times daily with meals and nightly. 200 strip 11    budesonide 1 mg/2 mL NbSp SMARTSI Puff(s) Via Nebulizer Twice Daily      BUDESONIDE 1 MG/NORMAL SALINE 50 ML NASAL IRRIGATION       calcium carbonate (OS-JUNIE) 600 mg calcium (1,500 mg) Tab  "Take 600 mg by mouth once.      carvediloL (COREG) 6.25 MG tablet TAKE 1 TABLET BY MOUTH TWICE DAILY WITH MEALS 60 tablet 3    furosemide (LASIX) 20 MG tablet Take 1 tablet (20 mg total) by mouth once daily. 7 tablet 0    glucagon (GVOKE HYPOPEN 2-PACK) 1 mg/0.2 mL AtIn Inject 1 mg into the skin as needed (severe hypoglycemia). 1 each 1    insulin aspart U-100 (NOVOLOG FLEXPEN U-100 INSULIN) 100 unit/mL (3 mL) InPn pen Take 5 units with breakfast and lunch, and take 6 units with dinner plus correction scale, max TDD 27 units 15 mL 11    insulin detemir U-100 (LEVEMIR FLEXTOUCH U-100 INSULN) 100 unit/mL (3 mL) InPn pen Inject 3 Units into the skin 2 (two) times daily. 15 mL 11    irbesartan (AVAPRO) 300 MG tablet TAKE 1 TABLET BY MOUTH EVERY EVENING 90 tablet 3    lancets (ACCU-CHEK FASTCLIX LANCET DRUM) [facility] BLOOD GLUCOSE FOUR TIMES DAILY 200 each 3    latanoprost 0.005 % ophthalmic solution Inject into the eye. 1 Drops Ophthalmic Every evening      levocetirizine (XYZAL) 5 MG tablet Take 1 tablet (5 mg total) by mouth every evening. 30 tablet 11    magnesium oxide (MAG-OX) 400 mg (241.3 mg magnesium) tablet Take 400 mg by mouth once daily.      montelukast (SINGULAIR) 10 mg tablet TAKE 1 TABLET BY MOUTH EVERY EVENING 30 tablet 6    neomycin-polymyxin-hydrocortisone (CORTISPORIN) 3.5-10,000-1 mg/mL-unit/mL-% otic suspension Use bid for nails 10 mL 6    olopatadine (PATANOL) 0.1 % ophthalmic solution Place 1 drop into both eyes 2 (two) times daily as needed. 1 Drops Ophthalmic Twice a day       pen needle, diabetic 31 gauge x 5/16" Ndle Inject 1 each into the skin 4 (four) times daily with meals and nightly. Use with insulin pen 200 each 11    vitamin D (VITAMIN D3) 1000 units Tab Take 1,000 Units by mouth once daily.      WIXELA INHUB 500-50 mcg/dose DsDv diskus inhaler USE 1 INHALATION ORALLY    TWICE DAILY INTO THE LUNGS 180 each 3    [DISCONTINUED] potassium chloride (KLOR-CON) 10 MEQ TbSR " Take 1 tablet (10 mEq total) by mouth once daily. 7 tablet 0    [DISCONTINUED] risedronate (ACTONEL) 35 MG tablet Take 1 tablet (35 mg total) by mouth every 7 days. 12 tablet 3     No facility-administered encounter medications on file as of 5/19/2022.       Plan:   Encourage hydration, nebulizer tid followed by a percussive maneuver by   Problem List Items Addressed This Visit     Pneumonia of left lower lobe due to infectious organism - Primary

## 2022-05-20 ENCOUNTER — PATIENT MESSAGE (OUTPATIENT)
Dept: GASTROENTEROLOGY | Facility: CLINIC | Age: 87
End: 2022-05-20
Payer: MEDICARE

## 2022-05-23 ENCOUNTER — TELEPHONE (OUTPATIENT)
Dept: ENDOSCOPY | Facility: HOSPITAL | Age: 87
End: 2022-05-23
Payer: MEDICARE

## 2022-05-23 NOTE — TELEPHONE ENCOUNTER
----- Message from Maritza Gonzalez sent at 5/23/2022  2:43 PM CDT -----  Contact: Patient  Type: Patient Call Back         Who called: Patient         What is the request in detail: calling to sched an appt for f/u up from Endoscopy to go over findings; state she was told nothing was avail until August; if so, patient would like a call back to go over; please advise         Can the clinic reply by [facility]?  yes         Would the patient rather a call back or a response via [facility]? either         Best call back number: [phone]         Additional Information: no flexibility            Thank You

## 2022-05-29 NOTE — DISCHARGE SUMMARY
[facility] (2nd Fl)  Discharge Note  Short Stay    Procedure(s) (LRB):  Bronchoscopy (N/A)    OUTCOME: Patient tolerated treatment/procedure well without complication and is now ready for discharge.    DISPOSITION: Home or Self Care    FINAL DIAGNOSIS:  <principal problem not specified>    FOLLOWUP: In clinic    DISCHARGE INSTRUCTIONS:  No discharge procedures on file.     TIME SPENT ON DISCHARGE: 10 minutes

## 2022-05-30 ENCOUNTER — TELEPHONE (OUTPATIENT)
Dept: ENDOSCOPY | Facility: HOSPITAL | Age: 87
End: 2022-05-30
Payer: MEDICARE

## 2022-05-30 DIAGNOSIS — K86.2 PANCREATIC CYST: Primary | ICD-10-CM

## 2022-05-30 DIAGNOSIS — K86.2 PANCREAS CYST: Primary | ICD-10-CM

## 2022-05-30 NOTE — TELEPHONE ENCOUNTER
----- Message from Max Rosado MD sent at 5/30/2022 12:45 PM CDT -----  Discuss with the patient EUS findings and FNA fluid evaluation. She will need a fecal elastase now and an MRI/MRCP in 6 months to follow up the pancreas cyst.  Thanks,  Max Rosado MD  ----- Message -----  From: Anayeli Lockhart MA  Sent: 5/30/2022   9:03 AM CDT  To: Max Rosado MD    Try to call her today if possible  ----- Message -----  From: Anayeli Lockhart MA  Sent: 5/30/2022  12:00 AM CDT  To: Anayeli Lockhart MA

## 2022-06-06 RX ORDER — CARVEDILOL 6.25 MG/1
6.25 TABLET ORAL 2 TIMES DAILY WITH MEALS
Qty: 60 TABLET | Refills: 3 | Status: ON HOLD | OUTPATIENT
Start: 2022-06-06 | End: 2022-07-11 | Stop reason: HOSPADM

## 2022-06-07 ENCOUNTER — OFFICE VISIT (OUTPATIENT)
Dept: OTOLARYNGOLOGY | Facility: CLINIC | Age: 87
End: 2022-06-07
Payer: MEDICARE

## 2022-06-07 VITALS
BODY MASS INDEX: 16.49 KG/M2 | SYSTOLIC BLOOD PRESSURE: 155 MMHG | DIASTOLIC BLOOD PRESSURE: 73 MMHG | HEART RATE: 66 BPM | WEIGHT: 99.13 LBS

## 2022-06-07 DIAGNOSIS — J32.8 OTHER CHRONIC SINUSITIS: Primary | Chronic | ICD-10-CM

## 2022-06-07 DIAGNOSIS — J30.89 NON-SEASONAL ALLERGIC RHINITIS, UNSPECIFIED TRIGGER: Chronic | ICD-10-CM

## 2022-06-07 DIAGNOSIS — J33.8 POLYP OF PARANASAL SINUS: ICD-10-CM

## 2022-06-07 PROCEDURE — 99214 OFFICE O/P EST MOD 30 MIN: CPT | Mod: PBBFAC,PN | Performed by: OTOLARYNGOLOGY

## 2022-06-07 PROCEDURE — 31231 NASAL ENDOSCOPY DX: CPT | Mod: S$PBB,,, | Performed by: OTOLARYNGOLOGY

## 2022-06-07 PROCEDURE — 31231 PR NASAL ENDOSCOPY, DX: ICD-10-PCS | Mod: S$PBB,,, | Performed by: OTOLARYNGOLOGY

## 2022-06-07 PROCEDURE — 99214 PR OFFICE/OUTPT VISIT, EST, LEVL IV, 30-39 MIN: ICD-10-PCS | Mod: 25,S$PBB,, | Performed by: OTOLARYNGOLOGY

## 2022-06-07 PROCEDURE — 99214 OFFICE O/P EST MOD 30 MIN: CPT | Mod: 25,S$PBB,, | Performed by: OTOLARYNGOLOGY

## 2022-06-07 PROCEDURE — 99999 PR PBB SHADOW E&M-EST. PATIENT-LVL IV: CPT | Mod: PBBFAC,,, | Performed by: OTOLARYNGOLOGY

## 2022-06-07 PROCEDURE — 31231 NASAL ENDOSCOPY DX: CPT | Mod: PBBFAC,PN | Performed by: OTOLARYNGOLOGY

## 2022-06-07 PROCEDURE — 99999 PR PBB SHADOW E&M-EST. PATIENT-LVL IV: ICD-10-PCS | Mod: PBBFAC,,, | Performed by: OTOLARYNGOLOGY

## 2022-06-07 RX ORDER — LEVOCETIRIZINE DIHYDROCHLORIDE 5 MG/1
5 TABLET, FILM COATED ORAL NIGHTLY
Qty: 90 TABLET | Refills: 3 | Status: SHIPPED | OUTPATIENT
Start: 2022-06-07 | End: 2023-06-07

## 2022-06-07 NOTE — PROGRESS NOTES
CC: follow up CRS    Subjective:      Sussy Costa is a 86 y.o. female who comes for follow-up for chronic rhino sinusitis with nasal polyposis(AERD) status-post revision endoscopic sinus surgery remotely. She reporrs she has been doing well regarding her sinus symptoms. She reports that she did have pneumothorax in the interim since our last visit.  She has had She states that she has been using her nasal saline rinses with budesonide once daily. She denies rhinorrhea, post-nasal drip, facial pain or pressure, epistaxis.     Objective:     BP (!) 155/73 (BP Location: Left arm, Patient Position: Sitting, BP Method: Large (Automatic))   Pulse 66   Wt 45 kg (99 lb 1.6 oz)   BMI 16.49 kg/m²      Constitutional: Well appearing / communicating.  NAD.  Eyes: EOM I Bilaterally  Head/Face: Normocephalic.  Negative paranasal sinus pressure/tenderness.  Salivary glands WNL.  House Brackmann I Bilaterally.    Right Ear: External Auditory Canal WNL,TM w/o masses/lesions/perforations.  Auricle WNL.  Left Ear: External Auditory Canal WNL,TM w/o masses/lesions/perforations. Auricle WNL.  Nose: No gross nasal septal deviation. Inferior Turbinates 3+ bilaterally. No septal perforation. No masses/lesions. External nasal skin without masses/lesions.  Oral Cavity: Gingiva/lips WNL.  FOM Soft, no masses palpated. Oral Tongue mobile. Hard Palate WNL.   Oropharynx: BOT WNL. No masses/lesions noted. Tonsillar fossa/pharyngeal wall without lesions. Posterior oropharynx WNL.  Soft palate without masses. Midline uvula.   Neck/Lymphatic: No LAD I-VI bilaterally.  No thyromegaly.  No masses noted on exam.      Procedure    Nasal endoscopy performed.  See procedure note.        Data Reviewed    Pathology report indicated chronic inflammation with eosinophilia.       Assessment:     1. Other chronic sinusitis    2. Non-seasonal allergic rhinitis, unspecified trigger    3. Polyp of paranasal sinus         Plan:     Continue budesonide and  mupirocin nasal nebulized solution and nasal saline rinses(refills provided).   Continue Singulair and Xyzal.     Follow up in about 6 months (around 12/7/2022).     Rosangela Isabel MD

## 2022-06-07 NOTE — PROCEDURES
Procedures       PROCEDURE NOTE:  Nasal endoscopy   Preprocedure diagnosis:  Chronic sinusitis with polyposis  Postprocedure diangosis:  Same  Complications:  None  Blood Loss:  None    Procedure in detail:  After verbal consent was obtained, the patient's nasal cavity was anesthesized using topical 1%lidocaine and Neosynepherine.  A rigid 0 degree endoscope was placed in first the right, then the left nasal cavity.  The inferior and middle turbinates were examined, and found to be boggy with edema bilaterally.   The middle meatus and maxillary antrum was also examined, and found to be patent bilaterally. Sphenoidotomy patent bilaterally. No evidence of brittany polyps.  No purulent drainage or masses seen. No crusting noted.  The patient tolerated the procedure well and there were no complications.

## 2022-06-08 RX ORDER — FLUTICASONE PROPIONATE AND SALMETEROL 500; 50 UG/1; UG/1
1 POWDER RESPIRATORY (INHALATION) 2 TIMES DAILY
Qty: 180 EACH | Refills: 3 | Status: SHIPPED | OUTPATIENT
Start: 2022-06-08 | End: 2023-07-13

## 2022-06-09 ENCOUNTER — LAB VISIT (OUTPATIENT)
Dept: LAB | Facility: HOSPITAL | Age: 87
End: 2022-06-09
Attending: INTERNAL MEDICINE
Payer: MEDICARE

## 2022-06-09 DIAGNOSIS — K86.2 PANCREATIC CYST: ICD-10-CM

## 2022-06-09 PROCEDURE — 82656 EL-1 FECAL QUAL/SEMIQ: CPT | Performed by: INTERNAL MEDICINE

## 2022-06-13 LAB — ELASTASE 1, FECAL: <40 MCG/G

## 2022-06-14 ENCOUNTER — TELEPHONE (OUTPATIENT)
Dept: ENDOSCOPY | Facility: HOSPITAL | Age: 87
End: 2022-06-14
Payer: MEDICARE

## 2022-06-16 ENCOUNTER — TELEPHONE (OUTPATIENT)
Dept: ENDOSCOPY | Facility: HOSPITAL | Age: 87
End: 2022-06-16
Payer: MEDICARE

## 2022-06-16 DIAGNOSIS — K86.1 CHRONIC PANCREATITIS, UNSPECIFIED PANCREATITIS TYPE: Primary | ICD-10-CM

## 2022-06-16 RX ORDER — PANCRELIPASE 24000; 76000; 120000 [USP'U]/1; [USP'U]/1; [USP'U]/1
CAPSULE, DELAYED RELEASE PELLETS ORAL
Qty: 270 CAPSULE | Refills: 11 | Status: SHIPPED | OUTPATIENT
Start: 2022-06-16 | End: 2022-06-24 | Stop reason: SDUPTHER

## 2022-06-16 NOTE — TELEPHONE ENCOUNTER
----- Message from Abdi Garcia sent at 6/16/2022 10:50 AM CDT -----  Regarding: Call BAck  Name of Who is Calling:INNFA ORTEGA [[id number]]              What is the request in detail: Patient requesting a call back about medication Creon. No other details was given about this medication.  Please assist              Can the clinic reply by [facility]: No              What Number to Call Back if not in [facility]:[phone]

## 2022-06-23 ENCOUNTER — TELEPHONE (OUTPATIENT)
Dept: ENDOSCOPY | Facility: HOSPITAL | Age: 87
End: 2022-06-23
Payer: MEDICARE

## 2022-06-23 ENCOUNTER — PATIENT MESSAGE (OUTPATIENT)
Dept: ENDOSCOPY | Facility: HOSPITAL | Age: 87
End: 2022-06-23
Payer: MEDICARE

## 2022-06-23 NOTE — TELEPHONE ENCOUNTER
----- Message from Mckinley Fernandes sent at 6/23/2022  2:18 PM CDT -----  Regarding: call back  Type:  Patient Returning Call    Who Called:patient     Who Left Message for Patient:n/a    Does the patient know what this is regarding?:patient calling concerning medication   Would the patient rather a call back or a response via [facility]? Call back     Call back: [phone]      Additional Information: n/a

## 2022-06-24 DIAGNOSIS — K86.1 CHRONIC PANCREATITIS, UNSPECIFIED PANCREATITIS TYPE: ICD-10-CM

## 2022-06-24 RX ORDER — PANCRELIPASE 24000; 76000; 120000 [USP'U]/1; [USP'U]/1; [USP'U]/1
CAPSULE, DELAYED RELEASE PELLETS ORAL
Qty: 270 CAPSULE | Refills: 11 | Status: ON HOLD | OUTPATIENT
Start: 2022-06-24 | End: 2023-06-26 | Stop reason: HOSPADM

## 2022-07-01 LAB
ACID FAST MOD KINY STN SPEC: NORMAL
MYCOBACTERIUM SPEC QL CULT: NORMAL

## 2022-07-05 ENCOUNTER — OFFICE VISIT (OUTPATIENT)
Dept: ENDOCRINOLOGY | Facility: CLINIC | Age: 87
DRG: 202 | End: 2022-07-05
Payer: MEDICARE

## 2022-07-05 ENCOUNTER — HOSPITAL ENCOUNTER (INPATIENT)
Facility: HOSPITAL | Age: 87
LOS: 6 days | Discharge: HOME | DRG: 202 | End: 2022-07-11
Attending: EMERGENCY MEDICINE | Admitting: INTERNAL MEDICINE
Payer: MEDICARE

## 2022-07-05 VITALS
DIASTOLIC BLOOD PRESSURE: 82 MMHG | SYSTOLIC BLOOD PRESSURE: 126 MMHG | HEART RATE: 68 BPM | RESPIRATION RATE: 16 BRPM | WEIGHT: 92.25 LBS | BODY MASS INDEX: 15.35 KG/M2 | OXYGEN SATURATION: 99 %

## 2022-07-05 DIAGNOSIS — I21.4 NSTEMI (NON-ST ELEVATED MYOCARDIAL INFARCTION): ICD-10-CM

## 2022-07-05 DIAGNOSIS — R06.00 DYSPNEA, UNSPECIFIED TYPE: ICD-10-CM

## 2022-07-05 DIAGNOSIS — J45.51 SEVERE PERSISTENT ASTHMA WITH EXACERBATION: Primary | ICD-10-CM

## 2022-07-05 DIAGNOSIS — E78.2 MIXED HYPERLIPIDEMIA: ICD-10-CM

## 2022-07-05 DIAGNOSIS — R07.89 CHEST TIGHTNESS: ICD-10-CM

## 2022-07-05 DIAGNOSIS — E11.65 TYPE 2 DIABETES MELLITUS WITH HYPERGLYCEMIA, WITH LONG-TERM CURRENT USE OF INSULIN: ICD-10-CM

## 2022-07-05 DIAGNOSIS — J15.1 PNEUMONIA OF LEFT LOWER LOBE DUE TO PSEUDOMONAS SPECIES: ICD-10-CM

## 2022-07-05 DIAGNOSIS — Z79.4 TYPE 2 DIABETES MELLITUS WITH HYPERGLYCEMIA, WITH LONG-TERM CURRENT USE OF INSULIN: ICD-10-CM

## 2022-07-05 DIAGNOSIS — J45.901 SEVERE ASTHMA WITH ACUTE EXACERBATION, UNSPECIFIED WHETHER PERSISTENT: ICD-10-CM

## 2022-07-05 DIAGNOSIS — R94.31 EKG ABNORMALITIES: ICD-10-CM

## 2022-07-05 DIAGNOSIS — J45.901 SEVERE ASTHMA WITH EXACERBATION, UNSPECIFIED WHETHER PERSISTENT: ICD-10-CM

## 2022-07-05 DIAGNOSIS — T38.0X5A ADVERSE EFFECT OF CORTICOSTEROIDS, INITIAL ENCOUNTER: ICD-10-CM

## 2022-07-05 DIAGNOSIS — I10 PRIMARY HYPERTENSION: ICD-10-CM

## 2022-07-05 DIAGNOSIS — J45.901 EXACERBATION OF ASTHMA, UNSPECIFIED ASTHMA SEVERITY, UNSPECIFIED WHETHER PERSISTENT: ICD-10-CM

## 2022-07-05 DIAGNOSIS — R06.02 SOB (SHORTNESS OF BREATH): ICD-10-CM

## 2022-07-05 DIAGNOSIS — M81.0 SENILE OSTEOPOROSIS: Primary | ICD-10-CM

## 2022-07-05 LAB
ABO + RH BLD: NORMAL
ALBUMIN SERPL BCP-MCNC: 3.5 G/DL (ref 3.5–5.2)
ALP SERPL-CCNC: 67 U/L (ref 55–135)
ALT SERPL W/O P-5'-P-CCNC: 20 U/L (ref 10–44)
ANION GAP SERPL CALC-SCNC: 10 MMOL/L (ref 8–16)
APTT BLDCRRT: 21.6 SEC (ref 21–32)
AST SERPL-CCNC: 18 U/L (ref 10–40)
BASOPHILS # BLD AUTO: 0.04 K/UL (ref 0–0.2)
BASOPHILS NFR BLD: 0.4 % (ref 0–1.9)
BILIRUB SERPL-MCNC: 1 MG/DL (ref 0.1–1)
BLD GP AB SCN CELLS X3 SERPL QL: NORMAL
BNP SERPL-MCNC: 249 PG/ML (ref 0–99)
BUN SERPL-MCNC: 12 MG/DL (ref 6–30)
BUN SERPL-MCNC: 14 MG/DL (ref 8–23)
CALCIUM SERPL-MCNC: 10.4 MG/DL (ref 8.7–10.5)
CHLORIDE SERPL-SCNC: 100 MMOL/L (ref 95–110)
CHLORIDE SERPL-SCNC: 97 MMOL/L (ref 95–110)
CHOLEST SERPL-MCNC: 122 MG/DL (ref 120–199)
CHOLEST/HDLC SERPL: 2.3 {RATIO} (ref 2–5)
CO2 SERPL-SCNC: 29 MMOL/L (ref 23–29)
CREAT SERPL-MCNC: 0.6 MG/DL (ref 0.5–1.4)
CREAT SERPL-MCNC: 0.9 MG/DL (ref 0.5–1.4)
CTP QC/QA: YES
DIFFERENTIAL METHOD: ABNORMAL
EOSINOPHIL # BLD AUTO: 0 K/UL (ref 0–0.5)
EOSINOPHIL NFR BLD: 0 % (ref 0–8)
ERYTHROCYTE [DISTWIDTH] IN BLOOD BY AUTOMATED COUNT: 15 % (ref 11.5–14.5)
EST. GFR  (AFRICAN AMERICAN): >60 ML/MIN/1.73 M^2
EST. GFR  (NON AFRICAN AMERICAN): 58.5 ML/MIN/1.73 M^2
GLUCOSE SERPL-MCNC: 357 MG/DL (ref 70–110)
GLUCOSE SERPL-MCNC: 408 MG/DL (ref 70–110)
HCT VFR BLD AUTO: 41.3 % (ref 37–48.5)
HCT VFR BLD CALC: 38 %PCV (ref 36–54)
HDLC SERPL-MCNC: 54 MG/DL (ref 40–75)
HDLC SERPL: 44.3 % (ref 20–50)
HGB BLD-MCNC: 13.1 G/DL (ref 12–16)
IMM GRANULOCYTES # BLD AUTO: 0.04 K/UL (ref 0–0.04)
IMM GRANULOCYTES NFR BLD AUTO: 0.4 % (ref 0–0.5)
INR PPP: 1.2 (ref 0.8–1.2)
LDLC SERPL CALC-MCNC: 58.4 MG/DL (ref 63–159)
LYMPHOCYTES # BLD AUTO: 1.1 K/UL (ref 1–4.8)
LYMPHOCYTES NFR BLD: 10.7 % (ref 18–48)
MCH RBC QN AUTO: 30 PG (ref 27–31)
MCHC RBC AUTO-ENTMCNC: 31.7 G/DL (ref 32–36)
MCV RBC AUTO: 95 FL (ref 82–98)
MONOCYTES # BLD AUTO: 0.9 K/UL (ref 0.3–1)
MONOCYTES NFR BLD: 8.7 % (ref 4–15)
NEUTROPHILS # BLD AUTO: 7.9 K/UL (ref 1.8–7.7)
NEUTROPHILS NFR BLD: 79.8 % (ref 38–73)
NONHDLC SERPL-MCNC: 68 MG/DL
NRBC BLD-RTO: 0 /100 WBC
PLATELET # BLD AUTO: 204 K/UL (ref 150–450)
PMV BLD AUTO: 12.5 FL (ref 9.2–12.9)
POC IONIZED CALCIUM: 1.13 MMOL/L (ref 1.06–1.42)
POC TCO2 (MEASURED): 28 MMOL/L (ref 23–29)
POCT GLUCOSE: 325 MG/DL (ref 70–110)
POCT GLUCOSE: 456 MG/DL (ref 70–110)
POCT GLUCOSE: 469 MG/DL (ref 70–110)
POCT GLUCOSE: 485 MG/DL (ref 70–110)
POTASSIUM BLD-SCNC: 3.4 MMOL/L (ref 3.5–5.1)
POTASSIUM SERPL-SCNC: 3.4 MMOL/L (ref 3.5–5.1)
PROT SERPL-MCNC: 7 G/DL (ref 6–8.4)
PROTHROMBIN TIME: 12 SEC (ref 9–12.5)
RBC # BLD AUTO: 4.37 M/UL (ref 4–5.4)
SAMPLE: ABNORMAL
SARS-COV-2 RDRP RESP QL NAA+PROBE: NEGATIVE
SODIUM BLD-SCNC: 139 MMOL/L (ref 136–145)
SODIUM SERPL-SCNC: 136 MMOL/L (ref 136–145)
TRIGL SERPL-MCNC: 48 MG/DL (ref 30–150)
TROPONIN I SERPL DL<=0.01 NG/ML-MCNC: 0.01 NG/ML (ref 0–0.03)
TSH SERPL DL<=0.005 MIU/L-ACNC: 2.9 UIU/ML (ref 0.4–4)
WBC # BLD AUTO: 9.9 K/UL (ref 3.9–12.7)

## 2022-07-05 PROCEDURE — 83036 HEMOGLOBIN GLYCOSYLATED A1C: CPT | Performed by: HOSPITALIST

## 2022-07-05 PROCEDURE — 93010 EKG 12-LEAD: ICD-10-PCS | Mod: 76,,, | Performed by: INTERNAL MEDICINE

## 2022-07-05 PROCEDURE — 80061 LIPID PANEL: CPT | Performed by: EMERGENCY MEDICINE

## 2022-07-05 PROCEDURE — 25000003 PHARM REV CODE 250: Performed by: HOSPITALIST

## 2022-07-05 PROCEDURE — 25000003 PHARM REV CODE 250: Performed by: STUDENT IN AN ORGANIZED HEALTH CARE EDUCATION/TRAINING PROGRAM

## 2022-07-05 PROCEDURE — 99213 OFFICE O/P EST LOW 20 MIN: CPT | Mod: PBBFAC,25 | Performed by: INTERNAL MEDICINE

## 2022-07-05 PROCEDURE — 94761 N-INVAS EAR/PLS OXIMETRY MLT: CPT

## 2022-07-05 PROCEDURE — 99900035 HC TECH TIME PER 15 MIN (STAT)

## 2022-07-05 PROCEDURE — 85025 COMPLETE CBC W/AUTO DIFF WBC: CPT | Performed by: EMERGENCY MEDICINE

## 2022-07-05 PROCEDURE — 63600175 PHARM REV CODE 636 W HCPCS: Performed by: INTERNAL MEDICINE

## 2022-07-05 PROCEDURE — 20000000 HC ICU ROOM

## 2022-07-05 PROCEDURE — U0002 COVID-19 LAB TEST NON-CDC: HCPCS | Performed by: STUDENT IN AN ORGANIZED HEALTH CARE EDUCATION/TRAINING PROGRAM

## 2022-07-05 PROCEDURE — 25000242 PHARM REV CODE 250 ALT 637 W/ HCPCS

## 2022-07-05 PROCEDURE — 99214 PR OFFICE/OUTPT VISIT, EST, LEVL IV, 30-39 MIN: ICD-10-PCS | Mod: S$PBB,,, | Performed by: INTERNAL MEDICINE

## 2022-07-05 PROCEDURE — 85730 THROMBOPLASTIN TIME PARTIAL: CPT | Mod: 91 | Performed by: INTERNAL MEDICINE

## 2022-07-05 PROCEDURE — 94640 AIRWAY INHALATION TREATMENT: CPT

## 2022-07-05 PROCEDURE — 25000242 PHARM REV CODE 250 ALT 637 W/ HCPCS: Performed by: INTERNAL MEDICINE

## 2022-07-05 PROCEDURE — 93010 ELECTROCARDIOGRAM REPORT: CPT | Mod: ,,, | Performed by: INTERNAL MEDICINE

## 2022-07-05 PROCEDURE — 27000221 HC OXYGEN, UP TO 24 HOURS

## 2022-07-05 PROCEDURE — 93005 ELECTROCARDIOGRAM TRACING: CPT

## 2022-07-05 PROCEDURE — 99291 PR CRITICAL CARE, E/M 30-74 MINUTES: ICD-10-PCS | Mod: GC,,, | Performed by: INTERNAL MEDICINE

## 2022-07-05 PROCEDURE — 85730 THROMBOPLASTIN TIME PARTIAL: CPT | Performed by: EMERGENCY MEDICINE

## 2022-07-05 PROCEDURE — 84484 ASSAY OF TROPONIN QUANT: CPT

## 2022-07-05 PROCEDURE — 99291 CRITICAL CARE FIRST HOUR: CPT | Mod: GC,,, | Performed by: INTERNAL MEDICINE

## 2022-07-05 PROCEDURE — 99284 EMERGENCY DEPT VISIT MOD MDM: CPT | Mod: CS,,, | Performed by: EMERGENCY MEDICINE

## 2022-07-05 PROCEDURE — 84484 ASSAY OF TROPONIN QUANT: CPT | Performed by: EMERGENCY MEDICINE

## 2022-07-05 PROCEDURE — 99284 PR EMERGENCY DEPT VISIT,LEVEL IV: ICD-10-PCS | Mod: CS,,, | Performed by: EMERGENCY MEDICINE

## 2022-07-05 PROCEDURE — 83880 ASSAY OF NATRIURETIC PEPTIDE: CPT | Performed by: EMERGENCY MEDICINE

## 2022-07-05 PROCEDURE — 84443 ASSAY THYROID STIM HORMONE: CPT | Performed by: EMERGENCY MEDICINE

## 2022-07-05 PROCEDURE — 93010 EKG 12-LEAD: ICD-10-PCS | Mod: ,,, | Performed by: INTERNAL MEDICINE

## 2022-07-05 PROCEDURE — 93010 ELECTROCARDIOGRAM REPORT: CPT | Mod: 76,,, | Performed by: INTERNAL MEDICINE

## 2022-07-05 PROCEDURE — 63600175 PHARM REV CODE 636 W HCPCS: Performed by: STUDENT IN AN ORGANIZED HEALTH CARE EDUCATION/TRAINING PROGRAM

## 2022-07-05 PROCEDURE — 80053 COMPREHEN METABOLIC PANEL: CPT | Performed by: EMERGENCY MEDICINE

## 2022-07-05 PROCEDURE — 63600175 PHARM REV CODE 636 W HCPCS: Performed by: HOSPITALIST

## 2022-07-05 PROCEDURE — 99214 OFFICE O/P EST MOD 30 MIN: CPT | Mod: S$PBB,,, | Performed by: INTERNAL MEDICINE

## 2022-07-05 PROCEDURE — 99285 EMERGENCY DEPT VISIT HI MDM: CPT | Mod: 25,27

## 2022-07-05 PROCEDURE — 99999 PR PBB SHADOW E&M-EST. PATIENT-LVL III: CPT | Mod: PBBFAC,,, | Performed by: INTERNAL MEDICINE

## 2022-07-05 PROCEDURE — 25000003 PHARM REV CODE 250: Performed by: INTERNAL MEDICINE

## 2022-07-05 PROCEDURE — 25000003 PHARM REV CODE 250

## 2022-07-05 PROCEDURE — 80047 BASIC METABLC PNL IONIZED CA: CPT

## 2022-07-05 PROCEDURE — 99999 PR PBB SHADOW E&M-EST. PATIENT-LVL III: ICD-10-PCS | Mod: PBBFAC,,, | Performed by: INTERNAL MEDICINE

## 2022-07-05 PROCEDURE — 25000242 PHARM REV CODE 250 ALT 637 W/ HCPCS: Performed by: STUDENT IN AN ORGANIZED HEALTH CARE EDUCATION/TRAINING PROGRAM

## 2022-07-05 PROCEDURE — 85610 PROTHROMBIN TIME: CPT | Performed by: EMERGENCY MEDICINE

## 2022-07-05 PROCEDURE — 86901 BLOOD TYPING SEROLOGIC RH(D): CPT | Performed by: STUDENT IN AN ORGANIZED HEALTH CARE EDUCATION/TRAINING PROGRAM

## 2022-07-05 RX ORDER — GLUCAGON 1 MG
1 KIT INJECTION
Status: DISCONTINUED | OUTPATIENT
Start: 2022-07-05 | End: 2022-07-06

## 2022-07-05 RX ORDER — CLOPIDOGREL BISULFATE 75 MG/1
75 TABLET ORAL DAILY
Status: DISCONTINUED | OUTPATIENT
Start: 2022-07-06 | End: 2022-07-11 | Stop reason: HOSPADM

## 2022-07-05 RX ORDER — ASPIRIN 325 MG
3 TABLET ORAL ONCE
Status: COMPLETED | OUTPATIENT
Start: 2022-07-05 | End: 2022-07-05

## 2022-07-05 RX ORDER — ACETAMINOPHEN 325 MG/1
650 TABLET ORAL EVERY 6 HOURS PRN
Status: DISCONTINUED | OUTPATIENT
Start: 2022-07-05 | End: 2022-07-11 | Stop reason: HOSPADM

## 2022-07-05 RX ORDER — IPRATROPIUM BROMIDE AND ALBUTEROL SULFATE 2.5; .5 MG/3ML; MG/3ML
SOLUTION RESPIRATORY (INHALATION)
Status: COMPLETED
Start: 2022-07-05 | End: 2022-07-05

## 2022-07-05 RX ORDER — NITROGLYCERIN 0.4 MG/1
0.4 TABLET SUBLINGUAL EVERY 5 MIN PRN
Status: DISCONTINUED | OUTPATIENT
Start: 2022-07-05 | End: 2022-07-11 | Stop reason: HOSPADM

## 2022-07-05 RX ORDER — ASPIRIN 325 MG
120 TABLET ORAL ONCE
Status: DISCONTINUED | OUTPATIENT
Start: 2022-07-06 | End: 2022-07-05

## 2022-07-05 RX ORDER — CLOPIDOGREL 300 MG/1
300 TABLET, FILM COATED ORAL ONCE
Status: COMPLETED | OUTPATIENT
Start: 2022-07-05 | End: 2022-07-05

## 2022-07-05 RX ORDER — AMOXICILLIN 250 MG
1 CAPSULE ORAL 2 TIMES DAILY
Status: DISCONTINUED | OUTPATIENT
Start: 2022-07-05 | End: 2022-07-11 | Stop reason: HOSPADM

## 2022-07-05 RX ORDER — DIPHENHYDRAMINE HCL 50 MG
CAPSULE ORAL
Status: DISCONTINUED
Start: 2022-07-05 | End: 2022-07-05 | Stop reason: WASHOUT

## 2022-07-05 RX ORDER — INSULIN ASPART 100 [IU]/ML
0-5 INJECTION, SOLUTION INTRAVENOUS; SUBCUTANEOUS EVERY 6 HOURS PRN
Status: DISCONTINUED | OUTPATIENT
Start: 2022-07-05 | End: 2022-07-05

## 2022-07-05 RX ORDER — IPRATROPIUM BROMIDE AND ALBUTEROL SULFATE 2.5; .5 MG/3ML; MG/3ML
3 SOLUTION RESPIRATORY (INHALATION)
Status: DISCONTINUED | OUTPATIENT
Start: 2022-07-05 | End: 2022-07-11 | Stop reason: HOSPADM

## 2022-07-05 RX ORDER — IPRATROPIUM BROMIDE AND ALBUTEROL SULFATE 2.5; .5 MG/3ML; MG/3ML
3 SOLUTION RESPIRATORY (INHALATION)
Status: COMPLETED | OUTPATIENT
Start: 2022-07-05 | End: 2022-07-05

## 2022-07-05 RX ORDER — INSULIN ASPART 100 [IU]/ML
5 INJECTION, SOLUTION INTRAVENOUS; SUBCUTANEOUS
Status: DISCONTINUED | OUTPATIENT
Start: 2022-07-06 | End: 2022-07-06

## 2022-07-05 RX ORDER — ASPIRIN 325 MG
60 TABLET ORAL ONCE
Status: DISCONTINUED | OUTPATIENT
Start: 2022-07-06 | End: 2022-07-05

## 2022-07-05 RX ORDER — POTASSIUM CHLORIDE 20 MEQ/1
40 TABLET, EXTENDED RELEASE ORAL ONCE
Status: COMPLETED | OUTPATIENT
Start: 2022-07-05 | End: 2022-07-05

## 2022-07-05 RX ORDER — ASPIRIN 325 MG
150 TABLET ORAL ONCE
Status: DISCONTINUED | OUTPATIENT
Start: 2022-07-07 | End: 2022-07-05

## 2022-07-05 RX ORDER — ASPIRIN 325 MG
40 TABLET ORAL ONCE
Status: COMPLETED | OUTPATIENT
Start: 2022-07-05 | End: 2022-07-05

## 2022-07-05 RX ORDER — TALC
6 POWDER (GRAM) TOPICAL NIGHTLY PRN
Status: DISCONTINUED | OUTPATIENT
Start: 2022-07-05 | End: 2022-07-11 | Stop reason: HOSPADM

## 2022-07-05 RX ORDER — NAPROXEN SODIUM 220 MG/1
81 TABLET, FILM COATED ORAL DAILY
Status: DISCONTINUED | OUTPATIENT
Start: 2022-07-06 | End: 2022-07-06

## 2022-07-05 RX ORDER — PEN NEEDLE, DIABETIC 30 GX3/16"
1 NEEDLE, DISPOSABLE MISCELLANEOUS
Qty: 200 EACH | Refills: 11 | Status: SHIPPED | OUTPATIENT
Start: 2022-07-05 | End: 2023-02-15

## 2022-07-05 RX ORDER — LATANOPROST 50 UG/ML
1 SOLUTION/ DROPS OPHTHALMIC NIGHTLY
Status: DISCONTINUED | OUTPATIENT
Start: 2022-07-05 | End: 2022-07-11 | Stop reason: HOSPADM

## 2022-07-05 RX ORDER — METOPROLOL TARTRATE 50 MG/1
TABLET ORAL
Status: DISCONTINUED
Start: 2022-07-05 | End: 2022-07-05 | Stop reason: WASHOUT

## 2022-07-05 RX ORDER — IBUPROFEN 200 MG
16 TABLET ORAL
Status: DISCONTINUED | OUTPATIENT
Start: 2022-07-05 | End: 2022-07-06

## 2022-07-05 RX ORDER — ASPIRIN 325 MG
0.3 TABLET ORAL ONCE
Status: COMPLETED | OUTPATIENT
Start: 2022-07-05 | End: 2022-07-05

## 2022-07-05 RX ORDER — ASPIRIN 325 MG
1 TABLET ORAL ONCE
Status: COMPLETED | OUTPATIENT
Start: 2022-07-05 | End: 2022-07-05

## 2022-07-05 RX ORDER — FLUTICASONE FUROATE AND VILANTEROL 200; 25 UG/1; UG/1
1 POWDER RESPIRATORY (INHALATION) DAILY
Refills: 3 | Status: DISCONTINUED | OUTPATIENT
Start: 2022-07-05 | End: 2022-07-05

## 2022-07-05 RX ORDER — NITROGLYCERIN 0.4 MG/1
TABLET SUBLINGUAL
Status: DISCONTINUED
Start: 2022-07-05 | End: 2022-07-05 | Stop reason: WASHOUT

## 2022-07-05 RX ORDER — ASPIRIN 325 MG
650 TABLET ORAL ONCE
Status: DISCONTINUED | OUTPATIENT
Start: 2022-07-07 | End: 2022-07-05

## 2022-07-05 RX ORDER — VALSARTAN 160 MG/1
160 TABLET ORAL DAILY
Refills: 3 | Status: DISCONTINUED | OUTPATIENT
Start: 2022-07-05 | End: 2022-07-11 | Stop reason: HOSPADM

## 2022-07-05 RX ORDER — PREDNISONE 20 MG/1
40 TABLET ORAL DAILY
Status: DISCONTINUED | OUTPATIENT
Start: 2022-07-06 | End: 2022-07-05

## 2022-07-05 RX ORDER — GLUCAGON INJECTION, SOLUTION 1 MG/.2ML
1 INJECTION, SOLUTION SUBCUTANEOUS
Qty: 1 EACH | Refills: 1 | Status: SHIPPED | OUTPATIENT
Start: 2022-07-05 | End: 2023-01-18

## 2022-07-05 RX ORDER — SPIRONOLACTONE 25 MG/1
25 TABLET ORAL DAILY
Status: DISCONTINUED | OUTPATIENT
Start: 2022-07-05 | End: 2022-07-11 | Stop reason: HOSPADM

## 2022-07-05 RX ORDER — FLUTICASONE FUROATE AND VILANTEROL 200; 25 UG/1; UG/1
1 POWDER RESPIRATORY (INHALATION) DAILY
Status: DISCONTINUED | OUTPATIENT
Start: 2022-07-06 | End: 2022-07-11 | Stop reason: HOSPADM

## 2022-07-05 RX ORDER — CARVEDILOL 6.25 MG/1
6.25 TABLET ORAL 2 TIMES DAILY WITH MEALS
Status: DISCONTINUED | OUTPATIENT
Start: 2022-07-05 | End: 2022-07-06

## 2022-07-05 RX ORDER — ASPIRIN 325 MG
0.1 TABLET ORAL ONCE
Status: COMPLETED | OUTPATIENT
Start: 2022-07-05 | End: 2022-07-05

## 2022-07-05 RX ORDER — SODIUM CHLORIDE 0.9 % (FLUSH) 0.9 %
10 SYRINGE (ML) INJECTION
Status: DISCONTINUED | OUTPATIENT
Start: 2022-07-05 | End: 2022-07-11 | Stop reason: HOSPADM

## 2022-07-05 RX ORDER — INSULIN ASPART 100 [IU]/ML
INJECTION, SOLUTION INTRAVENOUS; SUBCUTANEOUS
Qty: 15 ML | Refills: 11 | Status: ON HOLD
Start: 2022-07-05 | End: 2022-07-11 | Stop reason: HOSPADM

## 2022-07-05 RX ORDER — HEPARIN SODIUM,PORCINE/D5W 25000/250
0-40 INTRAVENOUS SOLUTION INTRAVENOUS CONTINUOUS
Status: DISCONTINUED | OUTPATIENT
Start: 2022-07-05 | End: 2022-07-06

## 2022-07-05 RX ORDER — CLOPIDOGREL 300 MG/1
TABLET, FILM COATED ORAL
Status: COMPLETED
Start: 2022-07-05 | End: 2022-07-05

## 2022-07-05 RX ORDER — INSULIN ASPART 100 [IU]/ML
1-10 INJECTION, SOLUTION INTRAVENOUS; SUBCUTANEOUS
Status: DISCONTINUED | OUTPATIENT
Start: 2022-07-05 | End: 2022-07-06

## 2022-07-05 RX ORDER — CETIRIZINE HYDROCHLORIDE 5 MG/1
5 TABLET ORAL NIGHTLY
Refills: 3 | Status: DISCONTINUED | OUTPATIENT
Start: 2022-07-05 | End: 2022-07-05

## 2022-07-05 RX ORDER — IPRATROPIUM BROMIDE AND ALBUTEROL SULFATE 2.5; .5 MG/3ML; MG/3ML
3 SOLUTION RESPIRATORY (INHALATION) EVERY 4 HOURS PRN
Status: DISCONTINUED | OUTPATIENT
Start: 2022-07-05 | End: 2022-07-11 | Stop reason: HOSPADM

## 2022-07-05 RX ORDER — ASPIRIN 325 MG
20 TABLET ORAL ONCE
Status: COMPLETED | OUTPATIENT
Start: 2022-07-05 | End: 2022-07-05

## 2022-07-05 RX ORDER — ASPIRIN 325 MG
TABLET ORAL
Status: DISCONTINUED
Start: 2022-07-05 | End: 2022-07-05 | Stop reason: WASHOUT

## 2022-07-05 RX ORDER — GLUCAGON 1 MG
1 KIT INJECTION
Status: DISCONTINUED | OUTPATIENT
Start: 2022-07-05 | End: 2022-07-05

## 2022-07-05 RX ORDER — IBUPROFEN 200 MG
24 TABLET ORAL
Status: DISCONTINUED | OUTPATIENT
Start: 2022-07-05 | End: 2022-07-06

## 2022-07-05 RX ORDER — METHYLPREDNISOLONE SOD SUCC 125 MG
125 VIAL (EA) INJECTION
Status: COMPLETED | OUTPATIENT
Start: 2022-07-05 | End: 2022-07-05

## 2022-07-05 RX ORDER — PREDNISONE 20 MG/1
40 TABLET ORAL DAILY
Status: DISCONTINUED | OUTPATIENT
Start: 2022-07-06 | End: 2022-07-06

## 2022-07-05 RX ORDER — NAPROXEN SODIUM 220 MG/1
162 TABLET, FILM COATED ORAL ONCE
Status: COMPLETED | OUTPATIENT
Start: 2022-07-05 | End: 2022-07-05

## 2022-07-05 RX ORDER — MONTELUKAST SODIUM 10 MG/1
10 TABLET ORAL DAILY
Status: DISCONTINUED | OUTPATIENT
Start: 2022-07-05 | End: 2022-07-05

## 2022-07-05 RX ORDER — ASPIRIN 325 MG
30 TABLET ORAL ONCE
Status: DISCONTINUED | OUTPATIENT
Start: 2022-07-06 | End: 2022-07-05

## 2022-07-05 RX ORDER — EPINEPHRINE 1 MG/ML
0.3 INJECTION, SOLUTION INTRACARDIAC; INTRAMUSCULAR; INTRAVENOUS; SUBCUTANEOUS
Status: DISCONTINUED | OUTPATIENT
Start: 2022-07-05 | End: 2022-07-11 | Stop reason: HOSPADM

## 2022-07-05 RX ORDER — HYDRALAZINE HYDROCHLORIDE 20 MG/ML
5 INJECTION INTRAMUSCULAR; INTRAVENOUS ONCE
Status: COMPLETED | OUTPATIENT
Start: 2022-07-05 | End: 2022-07-05

## 2022-07-05 RX ORDER — MUPIROCIN 20 MG/G
OINTMENT TOPICAL 2 TIMES DAILY
Status: COMPLETED | OUTPATIENT
Start: 2022-07-05 | End: 2022-07-10

## 2022-07-05 RX ORDER — METOPROLOL TARTRATE 1 MG/ML
INJECTION, SOLUTION INTRAVENOUS
Status: DISCONTINUED
Start: 2022-07-05 | End: 2022-07-05 | Stop reason: WASHOUT

## 2022-07-05 RX ORDER — ONDANSETRON 8 MG/1
8 TABLET, ORALLY DISINTEGRATING ORAL EVERY 8 HOURS PRN
Status: DISCONTINUED | OUTPATIENT
Start: 2022-07-05 | End: 2022-07-11 | Stop reason: HOSPADM

## 2022-07-05 RX ORDER — ATORVASTATIN CALCIUM 20 MG/1
80 TABLET, FILM COATED ORAL NIGHTLY
Status: DISCONTINUED | OUTPATIENT
Start: 2022-07-05 | End: 2022-07-11 | Stop reason: HOSPADM

## 2022-07-05 RX ORDER — NAPROXEN SODIUM 220 MG/1
81 TABLET, FILM COATED ORAL ONCE
Status: COMPLETED | OUTPATIENT
Start: 2022-07-05 | End: 2022-07-05

## 2022-07-05 RX ORDER — ASPIRIN 325 MG
10 TABLET ORAL ONCE
Status: COMPLETED | OUTPATIENT
Start: 2022-07-05 | End: 2022-07-05

## 2022-07-05 RX ORDER — ASPIRIN 325 MG
325 TABLET ORAL ONCE
Status: DISCONTINUED | OUTPATIENT
Start: 2022-07-07 | End: 2022-07-05

## 2022-07-05 RX ADMIN — VALSARTAN 160 MG: 160 TABLET, FILM COATED ORAL at 03:07

## 2022-07-05 RX ADMIN — ASPIRIN 81 MG CHEWABLE TABLET 81 MG: 81 TABLET CHEWABLE at 07:07

## 2022-07-05 RX ADMIN — Medication 1 MG: at 06:07

## 2022-07-05 RX ADMIN — IPRATROPIUM BROMIDE AND ALBUTEROL SULFATE 3 ML: 2.5; .5 SOLUTION RESPIRATORY (INHALATION) at 07:07

## 2022-07-05 RX ADMIN — Medication 40 MG: at 07:07

## 2022-07-05 RX ADMIN — ATORVASTATIN CALCIUM 80 MG: 20 TABLET, FILM COATED ORAL at 09:07

## 2022-07-05 RX ADMIN — HEPARIN SODIUM 12 UNITS/KG/HR: 5000 INJECTION INTRAVENOUS; SUBCUTANEOUS at 04:07

## 2022-07-05 RX ADMIN — Medication 3 MG: at 06:07

## 2022-07-05 RX ADMIN — IPRATROPIUM BROMIDE AND ALBUTEROL SULFATE 3 ML: 2.5; .5 SOLUTION RESPIRATORY (INHALATION) at 02:07

## 2022-07-05 RX ADMIN — Medication 10 MG: at 07:07

## 2022-07-05 RX ADMIN — IPRATROPIUM BROMIDE AND ALBUTEROL SULFATE 3 ML: 2.5; .5 SOLUTION RESPIRATORY (INHALATION) at 01:07

## 2022-07-05 RX ADMIN — IPRATROPIUM BROMIDE AND ALBUTEROL SULFATE 3 ML: 2.5; .5 SOLUTION RESPIRATORY (INHALATION) at 10:07

## 2022-07-05 RX ADMIN — CLOPIDOGREL BISULFATE 300 MG: 300 TABLET, FILM COATED ORAL at 01:07

## 2022-07-05 RX ADMIN — HYDRALAZINE HYDROCHLORIDE 5 MG: 20 INJECTION, SOLUTION INTRAMUSCULAR; INTRAVENOUS at 03:07

## 2022-07-05 RX ADMIN — INSULIN ASPART 5 UNITS: 100 INJECTION, SOLUTION INTRAVENOUS; SUBCUTANEOUS at 09:07

## 2022-07-05 RX ADMIN — INSULIN ASPART 4 UNITS: 100 INJECTION, SOLUTION INTRAVENOUS; SUBCUTANEOUS at 05:07

## 2022-07-05 RX ADMIN — MUPIROCIN: 20 OINTMENT TOPICAL at 09:07

## 2022-07-05 RX ADMIN — CLOPIDOGREL 300 MG: 300 TABLET, FILM COATED ORAL at 01:07

## 2022-07-05 RX ADMIN — ASPIRIN 81 MG CHEWABLE TABLET 162 MG: 81 TABLET CHEWABLE at 08:07

## 2022-07-05 RX ADMIN — LATANOPROST 1 DROP: 50 SOLUTION OPHTHALMIC at 10:07

## 2022-07-05 RX ADMIN — Medication 0.3 MG: at 06:07

## 2022-07-05 RX ADMIN — Medication 20 MG: at 07:07

## 2022-07-05 RX ADMIN — METHYLPREDNISOLONE SODIUM SUCCINATE 80 MG: 40 INJECTION, POWDER, FOR SOLUTION INTRAMUSCULAR; INTRAVENOUS at 10:07

## 2022-07-05 RX ADMIN — MONTELUKAST 10 MG: 10 TABLET, FILM COATED ORAL at 03:07

## 2022-07-05 RX ADMIN — IPRATROPIUM BROMIDE AND ALBUTEROL SULFATE 3 ML: 2.5; .5 SOLUTION RESPIRATORY (INHALATION) at 05:07

## 2022-07-05 RX ADMIN — CARVEDILOL 6.25 MG: 6.25 TABLET, FILM COATED ORAL at 04:07

## 2022-07-05 RX ADMIN — METHYLPREDNISOLONE SODIUM SUCCINATE 125 MG: 125 INJECTION, POWDER, FOR SOLUTION INTRAMUSCULAR; INTRAVENOUS at 01:07

## 2022-07-05 RX ADMIN — Medication 0.1 MG: at 06:07

## 2022-07-05 RX ADMIN — POTASSIUM CHLORIDE 40 MEQ: 1500 TABLET, EXTENDED RELEASE ORAL at 05:07

## 2022-07-05 NOTE — PROGRESS NOTES
Subjective:      Patient ID: Sussy Costa is a 86 y.o. female.    Chief Complaint:  Osteoporosis and Diabetes      History of Present Illness  Ms. Costa presents for follow up of osteoporosis and type 2 DM.  Last visit 3/2022..     She was given first dose of Prolia in 2/2018 and developed joint pains and lower ext edema so it was decided that she would not receive another dose of Prolia. We decided against Reclast for the same reason and instead restarted her on an oral bisphosphonate which she has tolerated without side effects. After her last visit 12/2021 we decided to stop the oral bisphosphonate and try Prolia again. She received Prolia  in 1/2022 without any side effects and has another dose scheduled for next week.        First diagnosed with osteoporosis in 2007.     Current osteoporosis regimen:  Back on Prolia since 1/2022  Vitamin D 1000 units daily  No calcium supplementation at present     Osteoporosis medication history:  Actonel 8/2018 - 12/2021  Fosamax     Risk factors for osteoporosis  -Personal history of fracture: had a fracture of left 5th finger, no hx of fragility frax  -Family history of hip fracture or osteoporosis: mother had hip frax at age 92  -Premature/Early menopause: menopause at age 56, on HRT for a few months in the past  -Chronic steroid therapy: intermittently gets steroid taper for asthma  -History of rheumatoid arthritis: none  -Smoking History: never smoked  -Current EtOH use: social EtOH  -Fall Risk: good balance, has tripped and fallen twice in past 10-15 years     Height loss:  1/2 inch height loss     Exercise:  No formal exercise  No falls     Pending dental work:  None     Last Bone Density Scan dated 11/2021:  Comparison study done on 11/14/2019.     Lumbar spine:    BMD 0.830 g/cm2 and T-score -2.0.     Total Hip:           BMD 0.628 g/cm2 and T-score -2.6.     Distal 1/3 radius:     FINDINGS:  Lumbar spine (L1-L4):              BMD is 0.830 g/cm2, T-score is  -2.0, and Z-score is 0.9.     Total hip:                                BMD is 0.599 g/cm2, T-score is -2.8, and Z-score is -0.5.     Femoral neck:                          BMD is 0.504 g/cm2, T-score is -3.1, and Z-score is -0.6.     Distal 1/3 radius:                      Not applicable     FRAX:     39% risk of a major osteoporotic fracture in the next 10 years.     30% risk of hip fracture in the next 10 years.        Since her last visit she was diagnosed with pancreatitis and started on Creon. She has only been on Creon for one week. She has had no hypoglycemia recently. Her glucoses do seem to have trended up since starting Creon.      Currently on Levemir 3 units BID and Novolog 5 units with breakfast and lunch and 6 units with dinner plus correction scale.        Latest Reference Range & Units 08/13/21 10:38 12/07/21 09:41 12/09/21 14:31 03/08/22 10:13   Hemoglobin A1C External 4.0 - 5.6 % 8.4 (H) [1]   7.2 (H) [2] 9.7 (H) [3]   C-Peptide 0.78 - 5.19 ng/mL   0.26 (L)   0.77 (L)   Glutamic Acid Decarb Ab <=0.02 nmol/L   0.00 [4]             Reviewed glucose logs:            Review of Systems   Constitutional: Negative for chills and fever.   Gastrointestinal: Negative for nausea.       Objective:   Physical Exam  Vitals and nursing note reviewed.       BP Readings from Last 3 Encounters:   07/05/22 (!) 152/71   07/05/22 126/82   06/07/22 (!) 155/73     Wt Readings from Last 1 Encounters:   07/05/22 1238 44.5 kg (98 lb)       Body mass index is 15.35 kg/m².    Lab Review:   Lab Results   Component Value Date    HGBA1C 9.7 (H) 03/08/2022     Lab Results   Component Value Date    CHOL 150 07/26/2019    HDL 52 07/26/2019    LDLCALC 82.2 07/26/2019    TRIG 79 07/26/2019    CHOLHDL 34.7 07/26/2019     Lab Results   Component Value Date     03/08/2022    K 3.2 (L) 03/08/2022     03/08/2022    CO2 29 03/08/2022     (H) 03/08/2022    BUN 9 03/08/2022    CREATININE 0.7 03/08/2022    CALCIUM 9.2  03/08/2022    PROT 5.9 (L) 11/19/2021    ALBUMIN 3.1 (L) 03/08/2022    BILITOT 0.5 11/19/2021    ALKPHOS 66 11/19/2021    AST 25 11/19/2021    ALT 19 11/19/2021    ANIONGAP 7 (L) 03/08/2022    ESTGFRAFRICA >60.0 03/08/2022    EGFRNONAA >60.0 03/08/2022    TSH 2.959 05/28/2021         Assessment and Plan     Senile osteoporosis  --Patient with postmenopausal osteoporosis  --Risk factors include: age, post menopausal status, and fam hx of hip fracture  --Stopped risedronate and restarted denosumab after last visit  --She receieved denosumab in 1/2022 and tolerated without side effects, has upcoming injection scheduled  --Repeat BMD in 11/2023  --To continue vit d replacement      Type 2 diabetes mellitus with hyperglycemia, with long-term current use of insulin  --A1c goal <8.0%  --Last A1c above goal and having mostly hyperglycemia now  --She is very sensitive to small changes in insulin to to low body weight  --Continue Levemir 3 units BID  --Change Novolog to 6 units with breakfast and lunch, and 7 units with dinner  --She prefers to continue fingerstick glucoses at this time rather than use the Dexcom or Freestyle      Hyperlipidemia  --On statin      Labs and urine micro when she comes in for Prolia injection on 7/1, follow up in 3-4 months      Joshua Martines M.D. Staff Endocrinology

## 2022-07-05 NOTE — ED PROVIDER NOTES
Encounter Date: 7/5/2022       History     Chief Complaint   Patient presents with    Shortness of Breath     SOB x days, worse on exertion. Hx of asthma. 95% on RA     86-year-old female with DM, HLD, HTN, asthma, osteoporosis presents for shortness of breath.  Patient reports her shortness of breath has been present for several days but worsened this morning. Patient does not use oxygen at home.  Patient reports associated chest tightness that is intermittent.  Patient took a breathing treatment at home without significant improvement    The history is provided by the patient and medical records.     Review of patient's allergies indicates:   Allergen Reactions    Aspirin Other (See Comments)     Asthma    TRIAD OF NASAL POLYPS, ASA  ALLERGY AND ASTHMA.        Aspirin Other (See Comments)    Nsaids (non-steroidal anti-inflammatory drug) Other (See Comments)     AVOID DUE TO TRIAD OF ASA ALLERGY, NASAL POLYPS,AND ASTHMA    Penicillin      Other reaction(s): Rash    9/30/20: tolerates ceftriaxone     Past Medical History:   Diagnosis Date    Asthma     Cyst of pancreas     liver    Diabetes mellitus type II     Eczema of hand     Glaucoma     Hyperlipidemia     Hypertension     Lichen planus     gums    Osteoporosis     Pulmonary nodules      Past Surgical History:   Procedure Laterality Date    BRONCHOSCOPY N/A 5/13/2022    Procedure: Bronchoscopy;  Surgeon: [facility] Provider;  Location: [facility] OR [address] [place] [address];  Service: Anesthesiology;  Laterality: N/A;    CATARACT EXTRACTION, BILATERAL      CHOLECYSTECTOMY      ENDOSCOPIC ULTRASOUND OF UPPER GASTROINTESTINAL TRACT N/A 4/22/2022    Procedure: ULTRASOUND, UPPER GI TRACT, ENDOSCOPIC;  Surgeon: Max Rosado MD;  Location: [facility] ([address] [place] [address]);  Service: Endoscopy;  Laterality: N/A;  urgent EUS (linear) for abnormal CT scan with dilated PD and increased cyst of pancreas cyst.  pap 44 mmHg  4/13:fully vaccinated. instructions via portal-SC     EPIDURAL STEROID INJECTION INTO LUMBAR SPINE N/A 11/8/2018    Procedure: Injection-steroid-epidural-lumbar L5-S1;  Surgeon: Nicci Osorio Jr., MD;  [facility];  Service: Pain Management;  Laterality: N/A;    FUNCTIONAL ENDOSCOPIC SINUS SURGERY (FESS) USING COMPUTER-ASSISTED NAVIGATION N/A 6/17/2019    Procedure: FESS, USING COMPUTER-ASSISTED NAVIGATION;  Surgeon: Rosangela Isabel MD;  Location: [facility] OR;  Service: ENT;  Laterality: N/A;  video    HYSTERECTOMY      nasal polyps       Family History   Problem Relation Age of Onset    Heart disease Father     Heart disease Brother     Hypertension Brother      Social History     Tobacco Use    Smoking status: Never Smoker    Smokeless tobacco: Never Used   Substance Use Topics    Alcohol use: Yes     Comment: socially    Drug use: No     Review of Systems   Constitutional: Negative for chills and fever.   HENT: Negative for rhinorrhea and sore throat.    Eyes: Negative for photophobia and visual disturbance.   Respiratory: Positive for cough, chest tightness and shortness of breath.    Cardiovascular: Negative for palpitations.   Gastrointestinal: Negative for nausea and vomiting.   Genitourinary: Negative for difficulty urinating and dysuria.   Musculoskeletal: Negative for back pain and myalgias.   Skin: Negative for pallor and rash.   Neurological: Negative for dizziness, weakness, numbness and headaches.   Psychiatric/Behavioral: Negative for agitation and confusion.       Physical Exam     Initial Vitals [07/05/22 1238]   BP Pulse Resp Temp SpO2   (!) 152/71 104 20 97.9 °F (36.6 °C) 95 %      MAP       --         Physical Exam    Nursing note and vitals reviewed.  Constitutional:   Alert, frail appearing, speaking full sentences   HENT:   Head: Normocephalic and atraumatic.   Mouth/Throat: Oropharynx is clear and moist.   Eyes: Conjunctivae and EOM are normal.   Cardiovascular: Normal rate, regular rhythm, normal heart sounds and  intact distal pulses.   Pulmonary/Chest: No stridor.   Expiratory wheezes bilaterally, more pronounced on the left, mild tachypnea   Abdominal: Abdomen is soft. She exhibits no distension. There is no abdominal tenderness.   Musculoskeletal:         General: No tenderness or edema.     Neurological: She is alert and oriented to person, place, and time.   Skin: Skin is warm and dry. No rash noted.   Psychiatric: She has a normal mood and affect. Thought content normal.         ED Course   Procedures  Labs Reviewed   CBC W/ AUTO DIFFERENTIAL - Abnormal; Notable for the following components:       Result Value    MCHC 31.7 (*)     RDW 15.0 (*)     Gran # (ANC) 7.9 (*)     Gran % 79.8 (*)     Lymph % 10.7 (*)     All other components within normal limits   COMPREHENSIVE METABOLIC PANEL - Abnormal; Notable for the following components:    Potassium 3.4 (*)     Glucose 408 (*)     eGFR if non  58.5 (*)     All other components within normal limits   B-TYPE NATRIURETIC PEPTIDE - Abnormal; Notable for the following components:     (*)     All other components within normal limits   LIPID PANEL - Abnormal; Notable for the following components:    LDL Cholesterol 58.4 (*)     All other components within normal limits    Narrative:     ADD ON LIPID PANEL AND TSH PER SHELLY PERKINS RN PER KACY DAS MD ORDER# [id number] AND [id number] @  07/05/2022  15:01       ADD-ON APTT # [id number] PER KACY DAS MD 07/05/2022  15:27    ISTAT PROCEDURE - Abnormal; Notable for the following components:    POC Glucose 357 (*)     POC Potassium 3.4 (*)     All other components within normal limits   TROPONIN I   PROTIME-INR   APTT   LIPID PANEL   TSH   PROTIME-INR   TSH    Narrative:     ADD ON LIPID PANEL AND TSH PER SHELLY PERKINS RN PER KACY DAS MD ORDER# [id number] AND [id number] @  07/05/2022  15:01       ADD-ON APTT # [id number] PER KACY DAS MD 07/05/2022  15:27    SARS-COV-2 RDRP  GENE   TYPE & SCREEN   ISTAT CHEM8        ECG Results          EKG 12-lead (Final result)  Result time 07/05/22 16:49:32    Final result by Interface, Lab In [facility] (07/05/22 16:49:32)                 Narrative:    Test Reason : R06.02,    Vent. Rate : 090 BPM     Atrial Rate : 090 BPM     P-R Int : 156 ms          QRS Dur : 116 ms      QT Int : 420 ms       P-R-T Axes : 051 096 106 degrees     QTc Int : 513 ms    Poor data quality  Normal sinus rhythm  Rightward axis  Incomplete right bundle branch block  Cannot rule out Inferior infarct (cited on or before 05-JUL-2022)  Prolonged QT  Abnormal ECG  When compared with ECG of 05-JUL-2022 12:54,  Poor data quality in current ECG precludes serial comparison  Confirmed by DORA RAMSEY MD ([id number]) on 7/5/2022 4:49:20 PM    Referred By:             Confirmed By:DORA RAMSEY MD                             EKG 12-lead (Final result)  Result time 07/05/22 16:50:42    Final result by Interface, Lab In [facility] (07/05/22 16:50:42)                 Narrative:    Test Reason : R06.02,    Vent. Rate : 093 BPM     Atrial Rate : 093 BPM     P-R Int : 232 ms          QRS Dur : 114 ms      QT Int : 382 ms       P-R-T Axes : 115 081 059 degrees     QTc Int : 474 ms    Poor data quality  Sinus rhythm with 1st degree A-V block  Incomplete right bundle branch block  Abnormal ECG  When compared with ECG of 09-DEC-2021 15:03,  Premature supraventricular complexes are no longer Present  OH interval has increased  Incomplete right bundle branch block is now Present  Confirmed by DORA RAMSEY MD ([id number]) on 7/5/2022 4:50:33 PM    Referred By:             Confirmed By:DORA RAMSEY MD                            Imaging Results          X-Ray Chest AP Portable (Final result)  Result time 07/05/22 13:58:40    Final result by Gokul Aponte MD (07/05/22 13:58:40)                 Impression:      No detrimental change when compared with 05/19/2022.    Nonspecific catheter overlying the left lower  "hemithorax.      Electronically signed by: Gokul Aponte MD  Date:    07/05/2022  Time:    13:58             Narrative:    EXAMINATION:  XR CHEST AP PORTABLE    CLINICAL HISTORY:  Provided history is "SOB;  ".    TECHNIQUE:  One view of the chest.    COMPARISON:  05/19/2022 and 12/09/2021.    FINDINGS:  Cardiomediastinal silhouette is not significantly enlarged.  Atherosclerotic calcifications overlie the aortic arch.  Small lung nodules in the right lung base better evaluated on prior CT.  There is a small left-sided pleural effusion with adjacent passive atelectasis similar to the prior study.  There is a nonspecific catheter overlying the left lung base possibly within the pleura, heart, or external to the patient.  No distinct pneumothorax.                                 Medications   atorvastatin tablet 80 mg (has no administration in time range)   carvediloL tablet 6.25 mg (6.25 mg Oral Given 7/5/22 1636)   valsartan tablet 160 mg (160 mg Oral Given 7/5/22 1515)   fluticasone furoate-vilanteroL 200-25 mcg/dose diskus inhaler 1 puff (has no administration in time range)   sodium chloride 0.9% flush 10 mL (has no administration in time range)   ondansetron disintegrating tablet 8 mg (has no administration in time range)   senna-docusate 8.6-50 mg per tablet 1 tablet (has no administration in time range)   nitroGLYCERIN SL tablet 0.4 mg (has no administration in time range)   clopidogreL tablet 75 mg (has no administration in time range)   melatonin tablet 6 mg (has no administration in time range)   heparin 25,000 units in dextrose 5% 250 mL (100 units/mL) infusion LOW INTENSITY nomogram - [facility] (12 Units/kg/hr × 44.5 kg (Adjusted) Intravenous Verify Only 7/5/22 1800)   heparin 25,000 units in dextrose 5% (100 units/ml) IV bolus from bag - ADDITIONAL PRN BOLUS - 60 units/kg (max bolus 4000 units) (has no administration in time range)   heparin 25,000 units in dextrose 5% (100 units/ml) IV bolus from bag - " ADDITIONAL PRN BOLUS - 30 units/kg (max bolus 4000 units) (has no administration in time range)   albuterol-ipratropium 2.5 mg-0.5 mg/3 mL nebulizer solution 3 mL (3 mLs Nebulization Given 7/5/22 1435)   acetaminophen tablet 650 mg (has no administration in time range)   predniSONE tablet 40 mg (has no administration in time range)   Aspirin 0.1mg/0.1mL (1mg/mL solution)   (0.1 mg Oral Given 7/5/22 1803)     Followed by   Aspirin 0.3mg/0.3mL (1mg/mL solution)   (0.3 mg Oral Given 7/5/22 1817)     Followed by   Aspirin 1mg/1mL (1mg/mL solution)   (1 mg Oral Given 7/5/22 1833)     Followed by   Aspirin 3mg/3mL (1mg/mL solution)   (has no administration in time range)     Followed by   Aspirin 10mg/10mL (1mg/mL solution)   (has no administration in time range)     Followed by   Aspirin 20mg/20mL (1mg/mL solution)   (has no administration in time range)     Followed by   Aspirin 40mg/40mL (1mg/mL solution)   (has no administration in time range)     Followed by   aspirin chewable tablet 81 mg (has no administration in time range)     Followed by   aspirin chewable tablet 162 mg (has no administration in time range)   glucagon (human recombinant) injection 1 mg (has no administration in time range)   insulin aspart U-100 pen 0-5 Units (4 Units Subcutaneous Given 7/5/22 1748)   dextrose 10% bolus 125 mL (has no administration in time range)   EPINEPHrine injection 0.3 mg ( Intramuscular Canceled Entry 7/5/22 1738)   aspirin chewable tablet 81 mg (has no administration in time range)   albuterol-ipratropium 2.5 mg-0.5 mg/3 mL nebulizer solution 3 mL (3 mLs Nebulization Given 7/5/22 1703)   clevidipine (CLEVIPREX) 50 mg/100 mL infusion (0 mg/hr Intravenous Hold 7/5/22 1800)   spironolactone tablet 25 mg (0 mg Oral Hold 7/5/22 1700)   insulin aspart U-100 pen 5 Units (has no administration in time range)   insulin detemir U-100 pen 8 Units (has no administration in time range)   clopidogreL (PLAVIX) 300 mg tablet (  Override  Pull 7/5/22 1315)   ticagrelor (BRILINTA) 90 mg tablet (  Override Pull 7/5/22 1315)   albuterol-ipratropium 2.5 mg-0.5 mg/3 mL nebulizer solution 3 mL (3 mLs Nebulization Given 7/5/22 1334)   methylPREDNISolone sodium succinate injection 125 mg (125 mg Intravenous Given 7/5/22 1359)   clopidogreL tablet 300 mg (300 mg Oral Given 7/5/22 1352)   heparin 25,000 units in dextrose 5% (100 units/ml) IV bolus from bag INITIAL BOLUS (max bolus 4000 units) (2,670 Units Intravenous Bolus from Bag 7/5/22 1642)   hydrALAZINE injection 5 mg (5 mg Intravenous Given 7/5/22 1516)   potassium chloride SA CR tablet 40 mEq (40 mEq Oral Given 7/5/22 1742)     Medical Decision Making:   History:   Old Medical Records: I decided to obtain old medical records.  Old Records Summarized: records from clinic visits and records from previous admission(s).  Initial Assessment:   86-year-old female with DM, HLD, HTN, asthma, osteoporosis presents for shortness of breath and chest tightness  Patient without hypoxia or respiratory distress  EKG obtained while in triage was initially unremarkable, however repeat EKG shows ST elevation, abnormal morphology of the lateral leads.  On evaluation of this EKG, despite significant artifact likely clouding the picture, a STEMI alert was activated, cardiology called to bedside emergent  Suspect source of chest pain is secondary to asthma exacerbation   Presentation, risk factors, location/quality of pain, physical exam not consistent with acute PE, pneumothorax, thoracic aortic dissection, pericarditis, tamponade, pneumonia  No sudden tearing chest pain radiating to the back, no neurological complaints, pulses equal in extremities, inconsistent with dissection  Patient admitted to CCU for aspirin desensitization and further management             ED Course as of 07/05/22 1846   Tue Jul 05, 2022   1346 Discussed with cardiology, who will admit patient to CCU for aspirin desensitization and further  management [OK]      ED Course User Index  [OK] Wayne Ambrosio MD             Clinical Impression:   Final diagnoses:  [R06.02] SOB (shortness of breath)  [J45.901] Exacerbation of asthma, unspecified asthma severity, unspecified whether persistent (Primary)  [R07.89] Chest tightness  [R94.31] EKG abnormalities          ED Disposition Condition    Admit               Wayne Ambrosio MD  Resident  07/05/22 1644

## 2022-07-05 NOTE — ASSESSMENT & PLAN NOTE
--Patient with postmenopausal osteoporosis  --Risk factors include: age, post menopausal status, and fam hx of hip fracture  --Stopped risedronate and restarted denosumab after last visit  --She receieved denosumab in 1/2022 and tolerated without side effects, has upcoming injection scheduled  --Repeat BMD in 11/2023  --To continue vit d replacement

## 2022-07-05 NOTE — ED NOTES
Code STEMI called by Dr. Lr. MD Oseguera @ bedside. Cards @ bedside.    Pt hooked up to zoll, cardiac monitor.

## 2022-07-05 NOTE — ASSESSMENT & PLAN NOTE
--A1c goal <8.0%  --Last A1c above goal and having mostly hyperglycemia now  --She is very sensitive to small changes in insulin to to low body weight  --Continue Levemir 3 units BID  --Change Novolog to 6 units with breakfast and lunch, and 7 units with dinner  --She prefers to continue fingerstick glucoses at this time rather than use the Dexcom or Freestyle

## 2022-07-05 NOTE — PROGRESS NOTES
SICU PLAN OF CARE NOTE    Dx: Dyspnea    Shift Events: Admit from ED by RN x2 Hooked up to bedside monitor HR 90 NSR. SBP in the 200s (cardio resident notified) with response to Carvedilol. SPO2 >90% on RA. Started Heparin gtt and started Aspirin desensitization. Charge nurse, RT, cards team notified when started aspirin treatment, ambu bag and IM Epi as ordered at bedside.    Goals of Care: SPO2 > 90% MAP >65 SBP <180    Neuro: AAO x4    Vital Signs: BP (!) 168/80   Pulse 90   Temp 98 °F (36.7 °C)   Resp (!) 29   Wt 44.5 kg (98 lb)   SpO2 (!) 92%   BMI 16.31 kg/m²     Respiratory: Nasal Cannula 2L    Diet: Cardiac Diet    Gtts: Heparin    Urine Output: Voids Spontaneously 300 cc/4 hours     Labs/Accuchecks: Accu q6hrs Daily Labs.    Skin: No skin breakdown noted on admission. Bed plugged in mattress inflated. Pt repositions independently with assistance provided and frequent weight shift encouraged.

## 2022-07-05 NOTE — H&P
[facility]&P  Cardiology      Sussy Costa  YOB: 1935  Medical Record Number:  [id number]  Attending Physician:  Giselle Oseguera MD   Date of Admission: 7/5/2022       Hospital Day:  0  Current Principal Problem:  <principal problem not specified>      History     Cc:  Dyspnea with chest tightness    HPI  This 86 year female with history of hypertension, hyperlipidemia, diabetes mellitus, and asthma who presents to hospital secondary to dyspnea associated chest tightness.  Patient's symptoms started this morning after eating breakfast around in a.m. normally 1 patient has a mild asthma attack, she has some chest tightness associated with and this felt no different than her previous asthma exacerbations.  Her symptoms persisted and she decided come to the ED.  She denies any history of chest pain/tightness, or pressure at rest on exertion at baseline.  She does have mild dyspnea on exertion at baseline however she states that she can complete most for grocery shopping at [employer] or [employer] prior to experiencing dyspnea.  She denies any cardiac history such as MI, arrhythmia, or heart failure.  She did have a nuclear medicine stress test in 2012 which was normal.  Last echo he symptoms last year showed EF of 60% he was grossly unremarkable.  Of note, patient's  reports unexplained weight loss from approximately 30 lb over the past 6 months.  Denies any nausea, vomiting, or bloody stools.    In the ED patient is mildly hypertensive with systolic pressure 150s initially increased to 180s.  EKG on admission showing STEMI with sinus rhythm with ST elevations and T-wave inversions anteriorly however repeat EKG similar to baseline (12/2021) showing sinus rhythm with incomplete right bundle branch block and T-wave inversions anteriorly..  Patient is asymptomatic currently denies any dyspnea or chest tightness/pain, or pressure.  STEMI was initially called for initial  EKG.      Medications - Outpatient  Prior to Admission medications    Medication Sig Start Date End Date Taking? Authorizing Provider   albuterol (PROAIR HFA) 90 mcg/actuation inhaler Inhale 2 puffs into the lungs every 6 (six) hours as needed for Wheezing. Rescue 22   PAULA Casillas MD   albuterol (PROVENTIL) 2.5 mg /3 mL (0.083 %) nebulizer solution Take 3 mLs (2.5 mg total) by nebulization every 6 (six) hours as needed for Wheezing or Shortness of Breath. Rescue 22  Papa Peguero MD   atorvastatin (LIPITOR) 20 MG tablet Take 1 tablet (20 mg total) by mouth every evening. 22   PAULA Casillas MD   blood sugar diagnostic Strp 1 strip by Misc.(Non-Drug; Combo Route) route 4 (four) times daily with meals and nightly. 21   Joshua Martines MD   budesonide 1 mg/2 mL NbSp SMARTSI Puff(s) Via Nebulizer Twice Daily 21   Historical Provider   BUDESONIDE 1 MG/NORMAL SALINE 50 ML NASAL IRRIGATION     Historical Provider   calcium carbonate (OS-JUNIE) 600 mg calcium (1,500 mg) Tab Take 600 mg by mouth once.    Historical Provider   carvediloL (COREG) 6.25 MG tablet Take 1 tablet (6.25 mg total) by mouth 2 (two) times daily with meals. 22   PAULA Casillas MD   fluticasone-salmeterol diskus inhaler 500-50 mcg Inhale 1 puff into the lungs 2 (two) times daily. Controller 22   PAULA Casillas MD   furosemide (LASIX) 20 MG tablet Take 1 tablet (20 mg total) by mouth once daily. 21  Linda Rushing PA-C   glucagon (GVOKE HYPOPEN 2-PACK) 1 mg/0.2 mL AtIn Inject 1 mg into the skin as needed (severe hypoglycemia). 22   Joshua Martines MD   insulin aspart U-100 (NOVOLOG FLEXPEN U-100 INSULIN) 100 unit/mL (3 mL) InPn pen Take 6 units with breakfast and lunch, and take 7 units with dinner plus correction scale, max TDD 30 units 22   Joshua Martines MD   insulin detemir U-100 (LEVEMIR FLEXTOUCH U-100 INSULN) 100 unit/mL (3 mL) InPn pen Inject 3 Units into the  "skin 2 (two) times daily. 3/15/22 3/15/23  Joshua Martines MD   irbesartan (AVAPRO) 300 MG tablet TAKE 1 TABLET BY MOUTH EVERY EVENING 3/8/22   PAULA Casillas MD   lancets (ACCU-CHEK FASTCLIX LANCET DRUM) [facility] CHECK BLOOD GLUCOSE FOUR TIMES DAILY 3/15/22   Joshua Martines MD   latanoprost 0.005 % ophthalmic solution Inject into the eye. 1 Drops Ophthalmic Every evening    Historical Provider   levocetirizine (XYZAL) 5 MG tablet Take 1 tablet (5 mg total) by mouth every evening. 6/7/22 6/7/23  Rosangela Isabel MD   lipase-protease-amylase 24,000-76,000-120,000 units (CREON) 24,000-76,000 -120,000 unit capsule Take 2 capsules with meals orally and 1 capsule with snacks. 6/24/22   Max Rosado MD   magnesium oxide (MAG-OX) 400 mg (241.3 mg magnesium) tablet Take 400 mg by mouth once daily.    Historical Provider   montelukast (SINGULAIR) 10 mg tablet TAKE 1 TABLET BY MOUTH EVERY EVENING 4/13/22   PAULA Casillas MD   neomycin-polymyxin-hydrocortisone (CORTISPORIN) 3.5-10,000-1 mg/mL-unit/mL-% otic suspension Use bid for nails 10/1/21   Isabel Browning MD   olopatadine (PATANOL) 0.1 % ophthalmic solution Place 1 drop into both eyes 2 (two) times daily as needed. 1 Drops Ophthalmic Twice a day     Historical Provider   pen needle, diabetic 31 gauge x 5/16" Ndle Inject 1 each into the skin 4 (four) times daily with meals and nightly. Use with insulin pen 7/5/22   Joshua Martines MD   vitamin D (VITAMIN D3) 1000 units Tab Take 1,000 Units by mouth once daily.    Historical Provider   glucagon (GVOKE HYPOPEN 2-PACK) 1 mg/0.2 mL AtIn Inject 1 mg into the skin as needed (severe hypoglycemia). 12/14/21 7/5/22  Joshua Martines MD   insulin aspart U-100 (NOVOLOG FLEXPEN U-100 INSULIN) 100 unit/mL (3 mL) InPn pen Take 5 units with breakfast and lunch, and take 6 units with dinner plus correction scale, max TDD 27 units 4/8/22 7/5/22  Joshua Martines MD   pen needle, diabetic 31 gauge x 5/16" Ndle Inject 1 each " into the skin 4 (four) times daily with meals and nightly. Use with insulin pen 8/16/21 7/5/22  Joshua Martines MD   risedronate (ACTONEL) 35 MG tablet Take 1 tablet (35 mg total) by mouth every 7 days. 8/26/21 4/22/22  Joshua Martines MD         Medications - Current  Scheduled Meds:   methylPREDNISolone sodium succinate  125 mg Intravenous ED 1 Time     Continuous Infusions:  PRN Meds:.      Allergies  Review of patient's allergies indicates:   Allergen Reactions    Aspirin Other (See Comments)     Asthma    TRIAD OF NASAL POLYPS, ASA  ALLERGY AND ASTHMA.        Aspirin Other (See Comments)    Nsaids (non-steroidal anti-inflammatory drug) Other (See Comments)     AVOID DUE TO TRIAD OF ASA ALLERGY, NASAL POLYPS,AND ASTHMA    Penicillin      Other reaction(s): Rash    9/30/20: tolerates ceftriaxone         Past Medical History  Past Medical History:   Diagnosis Date    Asthma     Cyst of pancreas     liver    Diabetes mellitus type II     Eczema of hand     Glaucoma     Hyperlipidemia     Hypertension     Lichen planus     gums    Osteoporosis     Pulmonary nodules          Past Surgical History  Past Surgical History:   Procedure Laterality Date    BRONCHOSCOPY N/A 5/13/2022    Procedure: Bronchoscopy;  [facility];  Location: [facility] OR [address] [place] [address];  Service: Anesthesiology;  Laterality: N/A;    CATARACT EXTRACTION, BILATERAL      CHOLECYSTECTOMY      ENDOSCOPIC ULTRASOUND OF UPPER GASTROINTESTINAL TRACT N/A 4/22/2022    Procedure: ULTRASOUND, UPPER GI TRACT, ENDOSCOPIC;  Surgeon: Max Rosado MD;  Location: [facility] ([address] [place] [address]);  Service: Endoscopy;  Laterality: N/A;  urgent EUS (linear) for abnormal CT scan with dilated PD and increased cyst of pancreas cyst.  pap 44 mmHg  4/13:fully vaccinated. instructions via portal-SC    EPIDURAL STEROID INJECTION INTO LUMBAR SPINE N/A 11/8/2018    Procedure: Injection-steroid-epidural-lumbar L5-S1;  Surgeon: Nicci Osorio Jr., MD;   Location: [facility];  Service: Pain Management;  Laterality: N/A;    FUNCTIONAL ENDOSCOPIC SINUS SURGERY (FESS) USING COMPUTER-ASSISTED NAVIGATION N/A 6/17/2019    Procedure: FESS, USING COMPUTER-ASSISTED NAVIGATION;  Surgeon: Rosangela Isabel MD;  Location: [facility] OR;  Service: ENT;  Laterality: N/A;  video    HYSTERECTOMY      nasal polyps           Social History  Social History     Socioeconomic History    Marital status:      Spouse name: sania    Number of children: 1   Occupational History    Occupation:    Tobacco Use    Smoking status: Never Smoker    Smokeless tobacco: Never Used   Substance and Sexual Activity    Alcohol use: Yes     Comment: socially    Drug use: No    Sexual activity: Yes     Partners: Male     Birth control/protection: Post-menopausal   Social History Narrative    She does not exercise regularly.     Social Determinants of Health     Financial Resource Strain: Low Risk     Difficulty of Paying Living Expenses: Not hard at all   Food Insecurity: Unknown    Worried About Running Out of Food in the Last Year: Patient refused    Ran Out of Food in the Last Year: Patient refused   Transportation Needs: Unknown    Lack of Transportation (Medical): Patient refused    Lack of Transportation (Non-Medical): Patient refused   Physical Activity: Unknown    Days of Exercise per Week: Patient refused   Stress: No Stress Concern Present    Feeling of Stress : Only a little   Social Connections: Unknown    Frequency of Communication with Friends and Family: Patient refused    Frequency of Social Gatherings with Friends and Family: Patient refused    Active Member of Clubs or Organizations: No    Attends Club or Organization Meetings: Patient refused    Marital Status:    Housing Stability: Unknown    Unable to Pay for Housing in the Last Year: Patient refused    Unstable Housing in the Last Year: Patient refused         ROS   Admits Denies    Constitutional  Chills, diaphoresis, malaise   Eyes  Visual changes   ENMT  Dysphagia, Epistaxis, nasal congestion, hearing loss   Cardiovascular Chest tightness Chest pain, palpitations, edema   Respiratory Dyspnea Cough, wheezing   Gastrointestinal  Nausea, vomiting, constipation, diarrhea, anorexia.     Genitourinary  Dysuria, incontinence   Musculoskeletal  Myalgias, joint pain, joint swelling   Integumentary  Rash, inflammation, burning   Neruo-Psychiatric  Anxiety, insomnia.  Changes in speech, strength, sensation.     Endocrine     Hematologic  Abnormal bruising, bleeding   Immunologic  Inflammation, pain at IV sites.  Pruritis.         Physical Examination         Vital Signs  Vitals  Temp: 97.9 °F (36.6 °C)  Temp src: Oral  Pulse: 98  Resp: (!) 22  SpO2: 98 %  O2 Device (Oxygen Therapy): room air  BP: (!) 152/71          24 Hour VS Range    Temp:  [97.9 °F (36.6 °C)]   Pulse:  []   Resp:  [16-22]   BP: (126-152)/(71-82)   SpO2:  [95 %-99 %]   No intake or output data in the 24 hours ending 07/05/22 1333      General:  Lying in bed no acute distress  Head: NCAT  Eyes: conjunctivae and lids normal, no scleral icterus, EOMI.  ENMT:  no gingival bleeding, normal oral mucosa without pallor or cyanosis.   Neck:  JVP normal.  Trachea non-displaced.     Chest:  Expiratory wheezing noted bilaterally.  No rhonchi or rales noted.  Heart:  Normal PMI, S1 S2 normal quality, splitting.  No murmurs rubs or gallops.  Peripheral pulses 2+.  Abdomen:  Non-distended, normal bowel sounds, non-tender.  No hepato-jugular reflux.  Extremities:  No edema. Normal capillary refill.    Skin:  Warm and dry.  No cyanosis or pallor.  No ulcers, stasis.  IV sites without tenderness or inflammation.    Neurological / Psychiatric:  Oriented to person, time, and place.  No facial asymmetry, drift.  Fluent without dysarthria.  Mood euthymic, affect normal.         Data       BNP (pg/mL)   Date Value   12/09/2021 226 (H)   08/13/2021  179 (H)   05/28/2021 259 (H)   09/28/2020 79   02/21/2018 107 (H)           Assessment & Plan      86 year female with history of hypertension, hyperlipidemia, diabetes mellitus, and asthma who presents to hospital secondary to dyspnea associated chest tightness.  Initial presentation concern for STEMI however repeat EKG negative for ST elevations.  Patient found to be and acute asthma exacerbation.    Dyspnea with chest tightness:  Unlikely STEMI.  Patient is currently asymptomatic denies any dyspnea, chest tightness, pressure, pain.  She is hypertensive at this time.  She also has aspirin allergy.  Discussed with Interventional Cardiology and Cardiology CCU Service  -will admit to ICU for aspirin desensitization protocol  -will load with Plavix 300 mg and start heparin while acute coronary syndrome is being ruled out  -EKG q.4 hours x3  -repeat echo    Abnormal EKG:  Currently back at baseline.  -see plan above    Acute asthma exacerbation  -DuoNebs q.4 hours p.r.n.  -short course of prednisone with taper    Unexplained weight loss  -CT abdomen pelvis ordered by ED will follow-up    Diabetes mellitus  -insulin sliding scale with Accu-Cheks    Hypertensive urgency  -will give 1 dose of IV hydralazine 5 mg with her home carvedilol.  -restart patient's home medications up titrate her Coreg as tolerated.    Hyperlipidemia  -restart patient's home medications    Signed:  Lorenzo Slater M.D.  Page # [phone]  Cardiovascular Fellow  [facility]

## 2022-07-05 NOTE — NURSING
Notified cardio resident of persistent hypertensions despite PRN administration. Also verified continuing heparin gtt despite HTN.

## 2022-07-06 PROBLEM — E44.0 MODERATE PROTEIN-CALORIE MALNUTRITION: Status: ACTIVE | Noted: 2022-07-06

## 2022-07-06 PROBLEM — T38.0X5A ADVERSE EFFECT OF CORTICOSTEROIDS: Status: ACTIVE | Noted: 2022-07-06

## 2022-07-06 LAB
ALBUMIN SERPL BCP-MCNC: 2.9 G/DL (ref 3.5–5.2)
ALLENS TEST: ABNORMAL
ALP SERPL-CCNC: 61 U/L (ref 55–135)
ALT SERPL W/O P-5'-P-CCNC: 16 U/L (ref 10–44)
ANION GAP SERPL CALC-SCNC: 11 MMOL/L (ref 8–16)
APTT BLDCRRT: 27.7 SEC (ref 21–32)
APTT BLDCRRT: 51.4 SEC (ref 21–32)
APTT BLDCRRT: 64.9 SEC (ref 21–32)
AST SERPL-CCNC: 14 U/L (ref 10–40)
AV INDEX (PROSTH): 0.73
AV MEAN GRADIENT: 6 MMHG
AV PEAK GRADIENT: 10 MMHG
AV VALVE AREA: 2.06 CM2
AV VELOCITY RATIO: 0.65
BASOPHILS # BLD AUTO: 0.01 K/UL (ref 0–0.2)
BASOPHILS NFR BLD: 0.1 % (ref 0–1.9)
BILIRUB SERPL-MCNC: 0.9 MG/DL (ref 0.1–1)
BSA FOR ECHO PROCEDURE: 1.43 M2
BUN SERPL-MCNC: 16 MG/DL (ref 8–23)
CALCIUM SERPL-MCNC: 9.2 MG/DL (ref 8.7–10.5)
CHLORIDE SERPL-SCNC: 99 MMOL/L (ref 95–110)
CO2 SERPL-SCNC: 24 MMOL/L (ref 23–29)
CREAT SERPL-MCNC: 0.9 MG/DL (ref 0.5–1.4)
CV ECHO LV RWT: 0.73 CM
DELSYS: ABNORMAL
DIFFERENTIAL METHOD: ABNORMAL
DOP CALC AO PEAK VEL: 1.57 M/S
DOP CALC AO VTI: 28.27 CM
DOP CALC LVOT AREA: 2.8 CM2
DOP CALC LVOT DIAMETER: 1.9 CM
DOP CALC LVOT PEAK VEL: 1.02 M/S
DOP CALC LVOT STROKE VOLUME: 58.18 CM3
DOP CALC MV VTI: 33.22 CM
DOP CALCLVOT PEAK VEL VTI: 20.53 CM
E WAVE DECELERATION TIME: 466.28 MSEC
E/A RATIO: 0.78
E/E' RATIO: 23.56 M/S
ECHO LV POSTERIOR WALL: 1.1 CM (ref 0.6–1.1)
EJECTION FRACTION: 60 %
EOSINOPHIL # BLD AUTO: 0 K/UL (ref 0–0.5)
EOSINOPHIL NFR BLD: 0 % (ref 0–8)
ERYTHROCYTE [DISTWIDTH] IN BLOOD BY AUTOMATED COUNT: 14.7 % (ref 11.5–14.5)
EST. GFR  (AFRICAN AMERICAN): >60 ML/MIN/1.73 M^2
EST. GFR  (NON AFRICAN AMERICAN): 58.5 ML/MIN/1.73 M^2
ESTIMATED AVG GLUCOSE: 189 MG/DL (ref 68–131)
FLOW: 2
FRACTIONAL SHORTENING: 39 % (ref 28–44)
GLUCOSE SERPL-MCNC: 468 MG/DL (ref 70–110)
HBA1C MFR BLD: 8.2 % (ref 4–5.6)
HCO3 UR-SCNC: 22.9 MMOL/L (ref 24–28)
HCT VFR BLD AUTO: 37.2 % (ref 37–48.5)
HGB BLD-MCNC: 12.2 G/DL (ref 12–16)
HR MV ECHO: 87 BPM
IMM GRANULOCYTES # BLD AUTO: 0.02 K/UL (ref 0–0.04)
IMM GRANULOCYTES NFR BLD AUTO: 0.3 % (ref 0–0.5)
INTERVENTRICULAR SEPTUM: 0.9 CM (ref 0.6–1.1)
LA MAJOR: 4.89 CM
LA MINOR: 5.19 CM
LA WIDTH: 3.8 CM
LEFT ATRIUM SIZE: 2.14 CM
LEFT ATRIUM VOLUME INDEX MOD: 37.1 ML/M2
LEFT ATRIUM VOLUME INDEX: 23.8 ML/M2
LEFT ATRIUM VOLUME MOD: 54.1 CM3
LEFT ATRIUM VOLUME: 34.81 CM3
LEFT INTERNAL DIMENSION IN SYSTOLE: 1.82 CM (ref 2.1–4)
LEFT VENTRICLE DIASTOLIC VOLUME INDEX: 29 ML/M2
LEFT VENTRICLE DIASTOLIC VOLUME: 42.34 ML
LEFT VENTRICLE MASS INDEX: 56 G/M2
LEFT VENTRICLE SYSTOLIC VOLUME INDEX: 6.8 ML/M2
LEFT VENTRICLE SYSTOLIC VOLUME: 9.93 ML
LEFT VENTRICULAR INTERNAL DIMENSION IN DIASTOLE: 3 CM (ref 3.5–6)
LEFT VENTRICULAR MASS: 82.14 G
LV LATERAL E/E' RATIO: 17.67 M/S
LV SEPTAL E/E' RATIO: 35.33 M/S
LYMPHOCYTES # BLD AUTO: 0.7 K/UL (ref 1–4.8)
LYMPHOCYTES NFR BLD: 8.5 % (ref 18–48)
MAGNESIUM SERPL-MCNC: 1.5 MG/DL (ref 1.6–2.6)
MCH RBC QN AUTO: 30.7 PG (ref 27–31)
MCHC RBC AUTO-ENTMCNC: 32.8 G/DL (ref 32–36)
MCV RBC AUTO: 94 FL (ref 82–98)
MODE: ABNORMAL
MONOCYTES # BLD AUTO: 0.1 K/UL (ref 0.3–1)
MONOCYTES NFR BLD: 1.3 % (ref 4–15)
MV MEAN GRADIENT: 4 MMHG
MV PEAK A VEL: 1.36 M/S
MV PEAK E VEL: 1.06 M/S
MV PEAK GRADIENT: 9 MMHG
MV STENOSIS PRESSURE HALF TIME: 135.22 MS
MV VALVE AREA BY CONTINUITY EQUATION: 1.75 CM2
MV VALVE AREA P 1/2 METHOD: 1.63 CM2
NEUTROPHILS # BLD AUTO: 7.1 K/UL (ref 1.8–7.7)
NEUTROPHILS NFR BLD: 89.8 % (ref 38–73)
NRBC BLD-RTO: 0 /100 WBC
PCO2 BLDA: 31.6 MMHG (ref 35–45)
PH SMN: 7.47 [PH] (ref 7.35–7.45)
PHOSPHATE SERPL-MCNC: 2.7 MG/DL (ref 2.7–4.5)
PISA TR MAX VEL: 2.39 M/S
PLATELET # BLD AUTO: 229 K/UL (ref 150–450)
PMV BLD AUTO: 12.1 FL (ref 9.2–12.9)
PO2 BLDA: 64 MMHG (ref 80–100)
POC BE: -1 MMOL/L
POC SATURATED O2: 94 % (ref 95–100)
POC TCO2: 24 MMOL/L (ref 23–27)
POCT GLUCOSE: 115 MG/DL (ref 70–110)
POCT GLUCOSE: 156 MG/DL (ref 70–110)
POCT GLUCOSE: 160 MG/DL (ref 70–110)
POCT GLUCOSE: 165 MG/DL (ref 70–110)
POCT GLUCOSE: 165 MG/DL (ref 70–110)
POCT GLUCOSE: 189 MG/DL (ref 70–110)
POCT GLUCOSE: 198 MG/DL (ref 70–110)
POCT GLUCOSE: 255 MG/DL (ref 70–110)
POCT GLUCOSE: 315 MG/DL (ref 70–110)
POCT GLUCOSE: 364 MG/DL (ref 70–110)
POCT GLUCOSE: 396 MG/DL (ref 70–110)
POCT GLUCOSE: 407 MG/DL (ref 70–110)
POCT GLUCOSE: 445 MG/DL (ref 70–110)
POCT GLUCOSE: 454 MG/DL (ref 70–110)
POCT GLUCOSE: 455 MG/DL (ref 70–110)
POTASSIUM SERPL-SCNC: 4 MMOL/L (ref 3.5–5.1)
PROT SERPL-MCNC: 5.6 G/DL (ref 6–8.4)
RA MAJOR: 4.26 CM
RA PRESSURE: 3 MMHG
RA WIDTH: 2.95 CM
RBC # BLD AUTO: 3.97 M/UL (ref 4–5.4)
RIGHT VENTRICULAR END-DIASTOLIC DIMENSION: 2.21 CM
RV TISSUE DOPPLER FREE WALL SYSTOLIC VELOCITY 1 (APICAL 4 CHAMBER VIEW): 11.28 CM/S
SAMPLE: ABNORMAL
SINUS: 3.32 CM
SITE: ABNORMAL
SODIUM SERPL-SCNC: 134 MMOL/L (ref 136–145)
SP02: 96
STJ: 2.55 CM
TDI LATERAL: 0.06 M/S
TDI SEPTAL: 0.03 M/S
TDI: 0.05 M/S
TR MAX PG: 23 MMHG
TRICUSPID ANNULAR PLANE SYSTOLIC EXCURSION: 2.08 CM
TV REST PULMONARY ARTERY PRESSURE: 26 MMHG
WBC # BLD AUTO: 7.89 K/UL (ref 3.9–12.7)

## 2022-07-06 PROCEDURE — 94640 AIRWAY INHALATION TREATMENT: CPT

## 2022-07-06 PROCEDURE — 63600175 PHARM REV CODE 636 W HCPCS: Performed by: HOSPITALIST

## 2022-07-06 PROCEDURE — 94761 N-INVAS EAR/PLS OXIMETRY MLT: CPT

## 2022-07-06 PROCEDURE — 83735 ASSAY OF MAGNESIUM: CPT | Performed by: STUDENT IN AN ORGANIZED HEALTH CARE EDUCATION/TRAINING PROGRAM

## 2022-07-06 PROCEDURE — 80053 COMPREHEN METABOLIC PANEL: CPT | Performed by: STUDENT IN AN ORGANIZED HEALTH CARE EDUCATION/TRAINING PROGRAM

## 2022-07-06 PROCEDURE — 25000242 PHARM REV CODE 250 ALT 637 W/ HCPCS: Performed by: INTERNAL MEDICINE

## 2022-07-06 PROCEDURE — 25000003 PHARM REV CODE 250: Performed by: INTERNAL MEDICINE

## 2022-07-06 PROCEDURE — 85025 COMPLETE CBC W/AUTO DIFF WBC: CPT | Performed by: STUDENT IN AN ORGANIZED HEALTH CARE EDUCATION/TRAINING PROGRAM

## 2022-07-06 PROCEDURE — 36600 WITHDRAWAL OF ARTERIAL BLOOD: CPT

## 2022-07-06 PROCEDURE — 85730 THROMBOPLASTIN TIME PARTIAL: CPT | Mod: 91 | Performed by: INTERNAL MEDICINE

## 2022-07-06 PROCEDURE — 25000003 PHARM REV CODE 250: Performed by: NURSE PRACTITIONER

## 2022-07-06 PROCEDURE — 20600001 HC STEP DOWN PRIVATE ROOM

## 2022-07-06 PROCEDURE — 84100 ASSAY OF PHOSPHORUS: CPT | Performed by: STUDENT IN AN ORGANIZED HEALTH CARE EDUCATION/TRAINING PROGRAM

## 2022-07-06 PROCEDURE — 99231 SBSQ HOSP IP/OBS SF/LOW 25: CPT | Mod: GC,,, | Performed by: INTERNAL MEDICINE

## 2022-07-06 PROCEDURE — 82803 BLOOD GASES ANY COMBINATION: CPT

## 2022-07-06 PROCEDURE — 25000242 PHARM REV CODE 250 ALT 637 W/ HCPCS: Performed by: EMERGENCY MEDICINE

## 2022-07-06 PROCEDURE — 99223 PR INITIAL HOSPITAL CARE,LEVL III: ICD-10-PCS | Mod: ,,, | Performed by: NURSE PRACTITIONER

## 2022-07-06 PROCEDURE — 25000003 PHARM REV CODE 250: Performed by: STUDENT IN AN ORGANIZED HEALTH CARE EDUCATION/TRAINING PROGRAM

## 2022-07-06 PROCEDURE — 63600175 PHARM REV CODE 636 W HCPCS: Performed by: STUDENT IN AN ORGANIZED HEALTH CARE EDUCATION/TRAINING PROGRAM

## 2022-07-06 PROCEDURE — 85730 THROMBOPLASTIN TIME PARTIAL: CPT | Performed by: STUDENT IN AN ORGANIZED HEALTH CARE EDUCATION/TRAINING PROGRAM

## 2022-07-06 PROCEDURE — 25000003 PHARM REV CODE 250: Performed by: HOSPITALIST

## 2022-07-06 PROCEDURE — 25000242 PHARM REV CODE 250 ALT 637 W/ HCPCS: Performed by: STUDENT IN AN ORGANIZED HEALTH CARE EDUCATION/TRAINING PROGRAM

## 2022-07-06 PROCEDURE — 27000221 HC OXYGEN, UP TO 24 HOURS

## 2022-07-06 PROCEDURE — 99900035 HC TECH TIME PER 15 MIN (STAT)

## 2022-07-06 PROCEDURE — 99223 1ST HOSP IP/OBS HIGH 75: CPT | Mod: ,,, | Performed by: NURSE PRACTITIONER

## 2022-07-06 PROCEDURE — 99231 PR SUBSEQUENT HOSPITAL CARE,LEVL I: ICD-10-PCS | Mod: GC,,, | Performed by: INTERNAL MEDICINE

## 2022-07-06 RX ORDER — INSULIN ASPART 100 [IU]/ML
6 INJECTION, SOLUTION INTRAVENOUS; SUBCUTANEOUS
Status: DISCONTINUED | OUTPATIENT
Start: 2022-07-07 | End: 2022-07-11

## 2022-07-06 RX ORDER — CARVEDILOL 12.5 MG/1
12.5 TABLET ORAL 2 TIMES DAILY WITH MEALS
Status: DISCONTINUED | OUTPATIENT
Start: 2022-07-06 | End: 2022-07-11 | Stop reason: HOSPADM

## 2022-07-06 RX ORDER — LEVOFLOXACIN 750 MG/1
750 TABLET ORAL EVERY OTHER DAY
Status: COMPLETED | OUTPATIENT
Start: 2022-07-06 | End: 2022-07-10

## 2022-07-06 RX ORDER — LANOLIN ALCOHOL/MO/W.PET/CERES
400 CREAM (GRAM) TOPICAL ONCE
Status: COMPLETED | OUTPATIENT
Start: 2022-07-06 | End: 2022-07-06

## 2022-07-06 RX ORDER — INSULIN ASPART 100 [IU]/ML
7 INJECTION, SOLUTION INTRAVENOUS; SUBCUTANEOUS
Status: DISCONTINUED | OUTPATIENT
Start: 2022-07-06 | End: 2022-07-11

## 2022-07-06 RX ORDER — IBUPROFEN 200 MG
24 TABLET ORAL
Status: DISCONTINUED | OUTPATIENT
Start: 2022-07-06 | End: 2022-07-11

## 2022-07-06 RX ORDER — INSULIN ASPART 100 [IU]/ML
0-4 INJECTION, SOLUTION INTRAVENOUS; SUBCUTANEOUS
Status: DISCONTINUED | OUTPATIENT
Start: 2022-07-06 | End: 2022-07-11

## 2022-07-06 RX ORDER — INSULIN ASPART 100 [IU]/ML
7 INJECTION, SOLUTION INTRAVENOUS; SUBCUTANEOUS
Status: DISCONTINUED | OUTPATIENT
Start: 2022-07-07 | End: 2022-07-06

## 2022-07-06 RX ORDER — IBUPROFEN 200 MG
16 TABLET ORAL
Status: DISCONTINUED | OUTPATIENT
Start: 2022-07-06 | End: 2022-07-11

## 2022-07-06 RX ORDER — INSULIN ASPART 100 [IU]/ML
3 INJECTION, SOLUTION INTRAVENOUS; SUBCUTANEOUS ONCE
Status: COMPLETED | OUTPATIENT
Start: 2022-07-06 | End: 2022-07-06

## 2022-07-06 RX ORDER — HEPARIN SODIUM 5000 [USP'U]/ML
5000 INJECTION, SOLUTION INTRAVENOUS; SUBCUTANEOUS EVERY 8 HOURS
Status: DISCONTINUED | OUTPATIENT
Start: 2022-07-06 | End: 2022-07-11 | Stop reason: HOSPADM

## 2022-07-06 RX ORDER — LEVOFLOXACIN 750 MG/1
750 TABLET ORAL DAILY
Status: DISCONTINUED | OUTPATIENT
Start: 2022-07-06 | End: 2022-07-06

## 2022-07-06 RX ORDER — LEVOFLOXACIN 750 MG/1
750 TABLET ORAL EVERY OTHER DAY
Status: DISCONTINUED | OUTPATIENT
Start: 2022-07-07 | End: 2022-07-06

## 2022-07-06 RX ORDER — GLUCAGON 1 MG
1 KIT INJECTION
Status: DISCONTINUED | OUTPATIENT
Start: 2022-07-06 | End: 2022-07-11

## 2022-07-06 RX ADMIN — INSULIN ASPART 5 UNITS: 100 INJECTION, SOLUTION INTRAVENOUS; SUBCUTANEOUS at 07:07

## 2022-07-06 RX ADMIN — INSULIN HUMAN 1.2 UNITS/HR: 1 INJECTION, SOLUTION INTRAVENOUS at 08:07

## 2022-07-06 RX ADMIN — INSULIN HUMAN 7 UNITS/HR: 1 INJECTION, SOLUTION INTRAVENOUS at 10:07

## 2022-07-06 RX ADMIN — INSULIN HUMAN 2.5 UNITS/HR: 1 INJECTION, SOLUTION INTRAVENOUS at 08:07

## 2022-07-06 RX ADMIN — HEPARIN SODIUM 5000 UNITS: 5000 INJECTION INTRAVENOUS; SUBCUTANEOUS at 09:07

## 2022-07-06 RX ADMIN — FLUTICASONE FUROATE AND VILANTEROL TRIFENATATE 1 PUFF: 200; 25 POWDER RESPIRATORY (INHALATION) at 09:07

## 2022-07-06 RX ADMIN — PREDNISONE 40 MG: 20 TABLET ORAL at 09:07

## 2022-07-06 RX ADMIN — LEVOFLOXACIN 750 MG: 750 TABLET, FILM COATED ORAL at 10:07

## 2022-07-06 RX ADMIN — SPIRONOLACTONE 25 MG: 25 TABLET, FILM COATED ORAL at 09:07

## 2022-07-06 RX ADMIN — VALSARTAN 160 MG: 160 TABLET, FILM COATED ORAL at 09:07

## 2022-07-06 RX ADMIN — Medication 400 MG: at 05:07

## 2022-07-06 RX ADMIN — METHYLPREDNISOLONE SODIUM SUCCINATE 80 MG: 40 INJECTION, POWDER, FOR SOLUTION INTRAMUSCULAR; INTRAVENOUS at 09:07

## 2022-07-06 RX ADMIN — IPRATROPIUM BROMIDE AND ALBUTEROL SULFATE 3 ML: 2.5; .5 SOLUTION RESPIRATORY (INHALATION) at 01:07

## 2022-07-06 RX ADMIN — ATORVASTATIN CALCIUM 80 MG: 20 TABLET, FILM COATED ORAL at 08:07

## 2022-07-06 RX ADMIN — MUPIROCIN: 20 OINTMENT TOPICAL at 08:07

## 2022-07-06 RX ADMIN — ASPIRIN 81 MG CHEWABLE TABLET 81 MG: 81 TABLET CHEWABLE at 09:07

## 2022-07-06 RX ADMIN — LATANOPROST 1 DROP: 50 SOLUTION OPHTHALMIC at 08:07

## 2022-07-06 RX ADMIN — HEPARIN SODIUM 5000 UNITS: 5000 INJECTION INTRAVENOUS; SUBCUTANEOUS at 02:07

## 2022-07-06 RX ADMIN — INSULIN ASPART 10 UNITS: 100 INJECTION, SOLUTION INTRAVENOUS; SUBCUTANEOUS at 07:07

## 2022-07-06 RX ADMIN — METHYLPREDNISOLONE SODIUM SUCCINATE 80 MG: 40 INJECTION, POWDER, FOR SOLUTION INTRAMUSCULAR; INTRAVENOUS at 12:07

## 2022-07-06 RX ADMIN — PANCRELIPASE 1 CAPSULE: 24000; 76000; 120000 CAPSULE, DELAYED RELEASE PELLETS ORAL at 12:07

## 2022-07-06 RX ADMIN — IPRATROPIUM BROMIDE AND ALBUTEROL SULFATE 3 ML: 2.5; .5 SOLUTION RESPIRATORY (INHALATION) at 08:07

## 2022-07-06 RX ADMIN — CLOPIDOGREL 75 MG: 75 TABLET, FILM COATED ORAL at 09:07

## 2022-07-06 RX ADMIN — PANCRELIPASE 1 CAPSULE: 24000; 76000; 120000 CAPSULE, DELAYED RELEASE PELLETS ORAL at 04:07

## 2022-07-06 RX ADMIN — MUPIROCIN: 20 OINTMENT TOPICAL at 09:07

## 2022-07-06 RX ADMIN — IPRATROPIUM BROMIDE AND ALBUTEROL SULFATE 3 ML: 2.5; .5 SOLUTION RESPIRATORY (INHALATION) at 07:07

## 2022-07-06 RX ADMIN — IPRATROPIUM BROMIDE AND ALBUTEROL SULFATE 3 ML: 2.5; .5 SOLUTION RESPIRATORY (INHALATION) at 03:07

## 2022-07-06 RX ADMIN — INSULIN ASPART 3 UNITS: 100 INJECTION, SOLUTION INTRAVENOUS; SUBCUTANEOUS at 05:07

## 2022-07-06 RX ADMIN — CARVEDILOL 12.5 MG: 12.5 TABLET, FILM COATED ORAL at 09:07

## 2022-07-06 RX ADMIN — CARVEDILOL 12.5 MG: 12.5 TABLET, FILM COATED ORAL at 04:07

## 2022-07-06 RX ADMIN — SENNOSIDES AND DOCUSATE SODIUM 1 TABLET: 50; 8.6 TABLET ORAL at 09:07

## 2022-07-06 RX ADMIN — HEPARIN SODIUM 15 UNITS/KG/HR: 5000 INJECTION INTRAVENOUS; SUBCUTANEOUS at 12:07

## 2022-07-06 NOTE — ASSESSMENT & PLAN NOTE
BG goal 140 - 180     Steroids likely contributed to significant BG excursions     Plan:  Continue IV insulin infusion protocol  Requires intensive BG monitoring while on protocol   Change diet to Diet Clear liquid (no sugar)/Bariatric     Plan to transition off intensive insulin protocol and back onto diabetic diet once BG stabilizes     Discharge planning: JACKY

## 2022-07-06 NOTE — NURSING
Notified cardio resident of critical glucose result, verbal orders to continue insulin infusion titrations, endocrine consulted

## 2022-07-06 NOTE — PROGRESS NOTES
[facility]  Cardiology  Progress Note    Patient Name: Sussy Costa  MRN: [id number]  Admission Date: 7/5/2022  Hospital Length of Stay: 1 days  Code Status: Full Code   Attending Physician: Umair Fraga MD   Primary Care Physician: EZ Casillas MD  Expected Discharge Date: 7/10/2022  Principal Problem:Dyspnea    Subjective:     Hospital Course:   Admitted to CCU after code STEMI called in the ED. On review of EKG there was no ST elevation; however given concern over ACS event patient was admitted to CCU for aspirin desensitization and started on ACS protocol. Her troponins were negative so ACS protocol was stopped and she was continued on plavix. She was also treated for asthma exacerbation with steroids, duonebs, and empiric levaquin.       Interval History: Overnight some difficulty swallowing, got extra dose of IV steroid. C/o dyspnea and wheezing but no chest pain or abd pain.     Review of Systems   Constitutional: Negative for malaise/fatigue.   HENT:  Negative for congestion.    Eyes:  Negative for pain.   Cardiovascular:  Positive for dyspnea on exertion. Negative for chest pain and irregular heartbeat.   Respiratory:  Positive for cough, shortness of breath and wheezing.    Endocrine: Negative for cold intolerance and heat intolerance.   Musculoskeletal:  Negative for back pain and falls.   Objective:     Vital Signs (Most Recent):  Temp: 97.9 °F (36.6 °C) (07/06/22 1505)  Pulse: 91 (07/06/22 1645)  Resp: (!) 23 (07/06/22 1645)  BP: 137/72 (07/06/22 1600)  SpO2: 96 % (07/06/22 1645)   Vital Signs (24h Range):  Temp:  [97.9 °F (36.6 °C)-98.3 °F (36.8 °C)] 97.9 °F (36.6 °C)  Pulse:  [] 91  Resp:  [12-41] 23  SpO2:  [90 %-99 %] 96 %  BP: (100-190)/() 137/72     Weight: 44.5 kg (98 lb)  Body mass index is 16.31 kg/m².     SpO2: 96 %  O2 Device (Oxygen Therapy): nasal cannula      Intake/Output Summary (Last 24 hours) at 7/6/2022 1984  Last data filed at 7/6/2022  1700  Gross per 24 hour   Intake 265.44 ml   Output 1150 ml   Net -884.56 ml       Lines/Drains/Airways       Drain  Duration             Female External Urinary Catheter 07/05/22 1819 <1 day              Peripheral Intravenous Line  Duration                  Peripheral IV - Single Lumen 07/05/22 1335 22 G Right Antecubital 1 day         Peripheral IV - Single Lumen 07/05/22 1500 22 G Left Antecubital 1 day                    Physical Exam  Nursing note reviewed.   Constitutional:       General: She is not in acute distress.     Appearance: She is not ill-appearing.   HENT:      Head: Normocephalic and atraumatic.   Eyes:      Pupils: Pupils are equal, round, and reactive to light.   Cardiovascular:      Rate and Rhythm: Normal rate.      Heart sounds: No murmur heard.  Pulmonary:      Effort: Pulmonary effort is normal.      Breath sounds: Wheezing present.   Abdominal:      General: Abdomen is flat.   Musculoskeletal:      Right lower leg: No edema.      Left lower leg: No edema.   Skin:     General: Skin is warm.      Capillary Refill: Capillary refill takes less than 2 seconds.   Neurological:      General: No focal deficit present.       Significant Labs: All pertinent lab results from the last 24 hours have been reviewed.    Significant Imaging:  Reviewed    Assessment and Plan:       * Dyspnea  Hx asthma, HTN here for dyspnea/chest tightness. Code stemi called in ED however not true STEMI. Was admitted to CCU for aspirin desensitization which she compelted - however trops negative, TTE without WMA. Overall entire presentation most consistent with asthma exacerbation so ACS protocol stopped.     - transfer to medicine  - duonebs q6hr  - levaquin  - solumedrol 80 q8hrs  - singulair  - ICS/LABA    Hyperlipidemia  - continue statin    Type 2 diabetes mellitus with hyperglycemia, with long-term current use of insulin  - insulin gtt  - endocrinology consulted, appreciate assistance  - transition to subq when  able    HTN (hypertension)  Home regimen: coreg 6.25 BID, irbesartan 300 mg qd, aldactone 25 qd    - continue home meds        VTE Risk Mitigation (From admission, onward)         Ordered     heparin (porcine) injection 5,000 Units  Every 8 hours         07/06/22 1142     IP VTE HIGH RISK PATIENT  Once         07/05/22 1349     Place sequential compression device  Until discontinued         07/05/22 1349                Linda Verdugo DO  Cardiology  [facility]

## 2022-07-06 NOTE — HOSPITAL COURSE
Admitted to CCU after code STEMI called in the ED. On review of EKG there was no ST elevation; however given concern over ACS event patient was admitted to CCU for aspirin desensitization and started on ACS protocol. Her troponins were negative so ACS protocol was stopped and she was continued on plavix. She was also treated for asthma exacerbation with steroids, duonebs, and empiric levaquin.

## 2022-07-06 NOTE — HPI
Reason for Consult: Management of T2DM, Hyperglycemia     Surgical Procedure and Date: N/A    Diabetes diagnosis year: about 25 years ago    Home Diabetes Medications:  Novolog 6 units with breakfast and lunch, 7 units with dinner, Levemir 3 units BID     How often checking glucose at home?  4x daily    BG readings on regimen: mostly above 200  Hypoglycemia on the regimen?  No  Missed doses on regimen?  No    Diabetes Complications include:     Hyperglycemia    Complicating diabetes co morbidities:   HTN, HLD      HPI:   Patient is a 86 y.o. female with a diagnosis of hypertension, hyperlipidemia, diabetes mellitus, and asthma who presents to hospital secondary to dyspnea associated chest tightness. In the ED, patient was mildly hypertensive with systolic pressure 150s initially increased to 180s.  EKG on admission showing STEMI with sinus rhythm with ST elevations and T-wave inversions anteriorly however repeat EKG similar to baseline (12/2021) showing sinus rhythm with incomplete right bundle branch block and T-wave inversions anteriorly..  Patient is asymptomatic currently denies any dyspnea or chest tightness/pain, or pressure.  Admitted to SICU. Patient last seen by Dr. Martines at [facility] endocrine clinic for DM management on 7/5/22.    Lab Results   Component Value Date    HGBA1C 8.2 (H) 07/05/2022

## 2022-07-06 NOTE — ASSESSMENT & PLAN NOTE
Glucocorticoids markedly increase glucoses. Expect increase insulin requirements while receiving steroids.

## 2022-07-06 NOTE — PROGRESS NOTES
"[facility]  Adult Nutrition  Consult Note    SUMMARY     Recommendations    1) When medically feasible, advance to diabetic diet to aid in blood glucose control.    2) Rec'd Boost Glucose Control BID (chocolate flavor) to promote weight maintenance/gain.     3) RD to monitor and f/u.    Goals: Meet % of kcal/protein needs by RD f/u date  Nutrition Goal Status: new  Communication of RD Recs:  (POC)    Assessment and Plan  Nutrition Problem  Moderate protein-calorie malnutrition  In the context of chronic illness/injury    Related to (etiology):   Insufficient energy intake for weight maintenance    Signs and Symptoms (as evidenced by):   Weight loss: 26% x < 1 year  Fat loss: moderate depletion to orbital and upper arm region  Muscle loss: moderate depletion to temple, clavicle/acromion, and dorsal hand region  BMI: 16.31     Interventions/Recommendations (treatment strategy):  Collaboration of nutrition care with other providers  Adequate PO intake  Commercial beverage    Nutrition Diagnosis Status:   New    Malnutrition Assessment  Malnutrition Type: chronic illness  Skin (Micronutrient): thinned   Weight Loss (Malnutrition): greater than 20% in 1 year  Subcutaneous Fat (Malnutrition): moderate depletion  Muscle Mass (Malnutrition): moderate depletion   Orbital Region (Subcutaneous Fat Loss): moderate depletion  Upper Arm Region (Subcutaneous Fat Loss): moderate depletion   Pentecostal Region (Muscle Loss): moderate depletion  Clavicle Bone Region (Muscle Loss): moderate depletion  Clavicle and Acromion Bone Region (Muscle Loss): moderate depletion  Dorsal Hand (Muscle Loss): moderate depletion   Severe Weight Loss (Malnutrition): greater than 20% in 1 year    Reason for Assessment    Reason For Assessment: consult (DM)  Diagnosis:  (dyspnea)  Relevant Medical History: DMII, HTN, HLD  Interdisciplinary Rounds: did not attend    General Information Comments: Pt seen 2/2 consult for "DM." " "Pt reorts gradual, unintentional weight loss of 34# (26%) since October 2021. Pt says she is eating as much as she typically would, but can't seem to maintain her weight. Pt denies chewing/swallowing difficulties PTA. NFPE - moderate wasting noted. Pt meets criteria for moderate malnutrition in the setting of chronic illness/injury - please see PES statement for details. Of note - pt has seen an outpatient dietitian in the past for DM management, has some knowledge of foods with carbs, adding that she tries to avoid "cookies and sweets." Pt amenable to additional diabetic education/ways to increase calorie/protein intake to promote weight gain. Diet education reviewed and handouts were provided. Encouraged consumption of low-carb nutrition supplements daily.    Nutrition Discharge Planning: adequate nutrition    Nutrition/Diet History    Food Allergies: NKFA    Anthropometrics    Temp: 97.9 °F (36.6 °C)  Weight: 44.5 kg (98 lb)  Weight (lb): 98 lb  BMI Grade: 16 - 16.9 protein-energy malnutrition grade II    Lab/Procedures/Meds    Pertinent Labs Reviewed: reviewed  Pertinent Labs Comments: Na 134, GFR 58.5, glu 468, Mag 1.5, Alb 2.9, HA1C 8.2%  Pertinent Medications Reviewed: reviewed  Pertinent Medications Comments: prednisone, spironolactone, senna docusate    Estimated/Assessed Needs    Weight Used For Calorie Calculations: 44.5 kg (98 lb 1.7 oz)  Energy Calorie Requirements (kcal): 5140-4346 kcal/day (30-35 kcal/kg)  Energy Need Method: Kcal/kg  Protein Requirements: 45-54g pro/day (1-1.2 g/kg)  Weight Used For Protein Calculations: 44.5 kg (98 lb 1.7 oz)  Estimated Fluid Requirement Method:  (1ml per kg or fluid per physician)  RDA Method (mL): 1335    Nutrition Prescription Ordered    Current Diet Order: cardiac    Evaluation of Received Nutrient/Fluid Intake    I/O: -0.9L since admit  Comments: LBM: 7/5  % Intake of Estimated Energy Needs: 50 - 75 %  % Meal Intake: 50 - 75 %    Nutrition Risk    Level of " Risk/Frequency of Follow-up:  (1x/week)     Monitor and Evaluation    Food and Nutrient Intake: energy intake, food and beverage intake  Food and Nutrient Adminstration: diet order  Knowledge/Beliefs/Attitudes: food and nutrition knowledge/skill  Physical Activity and Function: nutrition-related ADLs and IADLs  Anthropometric Measurements: weight, weight change  Biochemical Data, Medical Tests and Procedures: electrolyte and renal panel, glucose/endocrine profile  Nutrition-Focused Physical Findings: overall appearance     Nutrition Follow-Up    RD Follow-up?: Yes

## 2022-07-06 NOTE — PLAN OF CARE
SICU PLAN OF CARE NOTE    SHIFT EVENTS:  VSS. Completed aspirin desensitization. Monitor pt for anaphylaxis. Replaced Mg. Unable to complete CT scan on this shift. POC reviewed with patient and family; questions and concerns addressed. See flow sheets for full assessment details. Will continue to monitor patient closely.      Dx: Dyspnea    Vital Signs: BP (!) 148/80 (BP Location: Right arm, Patient Position: Lying)   Pulse 94   Temp 98 °F (36.7 °C) (Oral)   Resp (!) 24   Wt 44.5 kg (98 lb)   SpO2 (!) 92%   BMI 16.31 kg/m²     Neuro: AAO x4, Follows Commands and Moves All Extremities    Respiratory: Nasal Cannula 2L    Cardiac: NSR; HR 80-90s    Diet: Cardiac Diet    Gtts: Heparin     Urine Output: External Urinary Catheter 750 cc/shift     Labs: daily, aPTT Q6    Accuchecks: ACHS    SKIN NOTE:  Skin: No skin breakdown or skin tears noted at this time.    Skin precautions maintained including:  Frequent weight shift encouraged Q2 hr to prevent breakdown. Bed plugged in and mattress inflated; Adhesive use limited. Heels elevated off bed. Pressure points protected and positioning supports utilized.  Skin-to-device areas padded. Skin-to-skin areas padded

## 2022-07-06 NOTE — PROGRESS NOTES
"      SICU PLAN OF CARE NOTE    Dx: Dyspnea    Shift Events: IV insulin infusion with hourly checks. Continue steroid treatment hold all aspirin. OOBTC    Goals of Care: MAP > 65 SPO2 >90%; Stepdown    Neuro: AAO x4    Vital Signs: /72 (BP Location: Right arm, Patient Position: Lying)   Pulse 91   Temp 97.9 °F (36.6 °C) (Oral)   Resp (!) 23   Ht 5' 5" (1.651 m)   Wt 44.5 kg (98 lb)   SpO2 96%   BMI 16.31 kg/m²     Respiratory: Nasal Cannula 2L    Diet: Clear Liquid No sugar    Gtts: Insulin    Urine Output: Voids Spontaneously, multiple unmeasurable occurrences during shift due to multiple BM's. Pt voids spontaneously q2-3hrs    Drains:      Labs/Accuchecks: Hourly accu until stabilized; Daily labs.    Skin:  No new skin breakdown noted. Bed plugged in mattress inflated. Pt repositions independently with assistance provided and frequent weight shift encouraged.         "

## 2022-07-06 NOTE — CONSULTS
[facility]  Endocrinology  Diabetes Consult Note    Consult Requested by: Umair Fraga MD   Reason for admit: Dyspnea    HISTORY OF PRESENT ILLNESS:  Reason for Consult: Management of T2DM, Hyperglycemia     Surgical Procedure and Date: N/A    Diabetes diagnosis year: about 25 years ago    Home Diabetes Medications:  Novolog 6 units with breakfast and lunch, 7 units with dinner, Levemir 3 units BID     How often checking glucose at home?  4x daily    BG readings on regimen: mostly above 200  Hypoglycemia on the regimen?  No  Missed doses on regimen?  No    Diabetes Complications include:     Hyperglycemia    Complicating diabetes co morbidities:   HTN, HLD      HPI:   Patient is a 86 y.o. female with a diagnosis of hypertension, hyperlipidemia, diabetes mellitus, and asthma who presents to hospital secondary to dyspnea associated chest tightness. In the ED, patient was mildly hypertensive with systolic pressure 150s initially increased to 180s.  EKG on admission showing STEMI with sinus rhythm with ST elevations and T-wave inversions anteriorly however repeat EKG similar to baseline (2021) showing sinus rhythm with incomplete right bundle branch block and T-wave inversions anteriorly..  Patient is asymptomatic currently denies any dyspnea or chest tightness/pain, or pressure.  Admitted to SICU. Patient last seen by [facility]. Martines [facility] [place] [facility] management on 22.    Lab Results   Component Value Date    HGBA1C 8.2 (H) 2022              Interval HPI:   Overnight events: BG above goal ranges at time of consult on IV intensive insulin protocol. Infusion rates ranging from 2.5-8.5 u/hr. Received Solu Medrol 80 mg yesterday evening and Prednisone 40 mg this morning. Patient reports eating breakfast.   Eatin%  Nausea: No  Hypoglycemia and intervention: No  Fever: No  TPN and/or TF: No  If yes, type of TF/TPN and rate: n/a    PMH, PSH, FH, SH reviewed      ROS:  Constitutional: Negative for weight changes.  Eyes: Negative for visual disturbance.  Respiratory: Positive for SOB.   Cardiovascular: Positive for chest tightness.   Gastrointestinal: Negative for nausea.  Endocrine: Negative for polyuria, polydipsia.  Musculoskeletal: Negative for back pain.  Skin: Negative for rash.  Neurological: Negative for syncope.  Psychiatric/Behavioral: Negative for depression.    Review of Systems    Current Medications and/or Treatments Impacting Glycemic Control  Immunotherapy:    Immunosuppressants       None          Steroids:   Hormones (From admission, onward)                Start     Stop Route Frequency Ordered    07/06/22 1145  methylPREDNISolone sodium succinate injection 80 mg         -- IV Every 8 hours 07/06/22 1142    07/05/22 1448  melatonin tablet 6 mg         -- Oral Nightly PRN 07/05/22 1349          Pressors:    Autonomic Drugs (From admission, onward)                Start     Stop Route Frequency Ordered    07/05/22 1717  EPINEPHrine injection 0.3 mg         -- IM As needed (PRN) 07/05/22 1618          Hyperglycemia/Diabetes Medications:   Antihyperglycemics (From admission, onward)                Start     Stop Route Frequency Ordered    07/06/22 0900  insulin regular in 0.9 % NaCl 100 unit/100 mL (1 unit/mL) infusion        Question Answer Comment   Insulin Rate Adjustment (DO NOT MODIFY ANSWER) [email or web address]    Enter initial dose from Infusion Protocol Chart (Units/hr): 2.5        -- IV Continuous 07/06/22 0746             PHYSICAL EXAMINATION:  Vitals:    07/06/22 1215   BP:    Pulse: 100   Resp: (!) 30   Temp:      Body mass index is 16.31 kg/m².    Physical Exam  Constitutional: Well developed, well nourished, NAD.  ENT: External ears no masses with nose patent; normal hearing.  Neck: Supple; trachea midline.  Cardiovascular: Normal heart sounds, no LE edema. DP +2 bilaterally.  Lungs:  Normal effort; lungs anterior bilaterally clear to auscultation.  Abdomen: Soft, no masses, no hernias.  MS: No clubbing or cyanosis of nails noted; unable to assess gait.  Skin: No rashes, lesions, or ulcers; no nodules. Injection sites are ok. No lipo hypertropthy or atrophy.  Psychiatric: Good judgement and insight; normal mood and affect.  Neurological: Cranial nerves are grossly intact.   Foot: Nails in good condition, no amputations noted.        Labs Reviewed and Include   Recent Labs   Lab 07/06/22  0341   *   CALCIUM 9.2   ALBUMIN 2.9*   PROT 5.6*   *   K 4.0   CO2 24   CL 99   BUN 16   CREATININE 0.9   ALKPHOS 61   ALT 16   AST 14   BILITOT 0.9     Lab Results   Component Value Date    WBC 7.89 07/06/2022    HGB 12.2 07/06/2022    HCT 37.2 07/06/2022    MCV 94 07/06/2022     07/06/2022     Recent Labs   Lab 07/05/22  1316   TSH 2.897     Lab Results   Component Value Date    HGBA1C 8.2 (H) 07/05/2022       Nutritional status:   Body mass index is 16.31 kg/m².  Lab Results   Component Value Date    ALBUMIN 2.9 (L) 07/06/2022    ALBUMIN 3.5 07/05/2022    ALBUMIN 3.1 (L) 03/08/2022     No results found for: PREALBUMIN    Estimated Creatinine Clearance: 32.1 mL/min (based on SCr of 0.9 mg/dL).    Accu-Checks  Recent Labs     07/05/22 2019 07/05/22 2020 07/05/22  2123 07/06/22  0508 07/06/22  0725 07/06/22  0803 07/06/22  0937 07/06/22  1000 07/06/22  1058 07/06/22  1212   POCTGLUCOSE 469* 456* 485* 364* 396* 445* 454* 455* 407* 315*        ASSESSMENT and PLAN    * Dyspnea  Managed per primary team        Type 2 diabetes mellitus with hyperglycemia, with long-term current use of insulin  BG goal 140 - 180     Steroids likely contributed to significant BG excursions     Plan:  Continue IV insulin infusion protocol  Requires intensive BG monitoring while on protocol   Change diet to Diet Clear liquid (no sugar)/Bariatric     Plan to transition off intensive insulin protocol and back onto  diabetic diet once BG stabilizes     Discharge planning: TBD        Hyperlipidemia  May increase insulin resistance.         Adverse effect of corticosteroids  Glucocorticoids markedly increase glucoses. Expect increase insulin requirements while receiving steroids.           Plan discussed with patient, family, and RN at bedside.     Evita Flores NP  Endocrinology  [facility]

## 2022-07-06 NOTE — ASSESSMENT & PLAN NOTE
Hx asthma, HTN here for dyspnea/chest tightness. Code stemi called in ED however not true STEMI. Was admitted to CCU for aspirin desensitization which she compelted - however trops negative, TTE without WMA. Overall entire presentation most consistent with asthma exacerbation so ACS protocol stopped.     - transfer to medicine  - daron q6hr  - levaquin  - solumedrol 80 q8hrs  - singulair  - ICS/LABA

## 2022-07-06 NOTE — PLAN OF CARE
Recommendations    1) When medically feasible, advance to diabetic diet to aid in blood glucose control.    2) Rec'd Boost Glucose Control BID (chocolate flavor) to promote weight maintenance/gain.     3) RD to monitor and f/u.    Goals: Meet % of kcal/protein needs by RD f/u date  Nutrition Goal Status: new  Communication of RD Recs:  (POC)

## 2022-07-06 NOTE — PROGRESS NOTES
Patient finished taking all the doses of aspirin. She complained of difficult swallowing and but worsening sob. She has wheezing on exam. No lip or younger swelling noticed. Will give one dose of solumedrol 80 mg IV. Discussed with staff

## 2022-07-06 NOTE — SUBJECTIVE & OBJECTIVE
Interval HPI:   Overnight events: BG above goal ranges at time of consult on IV intensive insulin protocol. Infusion rates ranging from 2.5-8.5 u/hr. Received Solu Medrol 80 mg yesterday evening and Prednisone 40 mg this morning. Patient reports eating breakfast.   Eatin%  Nausea: No  Hypoglycemia and intervention: No  Fever: No  TPN and/or TF: No  If yes, type of TF/TPN and rate: n/a    PMH, PSH, FH, SH reviewed     ROS:  Constitutional: Negative for weight changes.  Eyes: Negative for visual disturbance.  Respiratory: Positive for SOB.   Cardiovascular: Positive for chest tightness.   Gastrointestinal: Negative for nausea.  Endocrine: Negative for polyuria, polydipsia.  Musculoskeletal: Negative for back pain.  Skin: Negative for rash.  Neurological: Negative for syncope.  Psychiatric/Behavioral: Negative for depression.    Review of Systems    Current Medications and/or Treatments Impacting Glycemic Control  Immunotherapy:    Immunosuppressants       None          Steroids:   Hormones (From admission, onward)                Start     Stop Route Frequency Ordered    22 1145  methylPREDNISolone sodium succinate injection 80 mg         -- IV Every 8 hours 22 1142    22 1448  melatonin tablet 6 mg         -- Oral Nightly PRN 22 1349          Pressors:    Autonomic Drugs (From admission, onward)                Start     Stop Route Frequency Ordered    22 1717  EPINEPHrine injection 0.3 mg         -- IM As needed (PRN) 22 1618          Hyperglycemia/Diabetes Medications:   Antihyperglycemics (From admission, onward)                Start     Stop Route Frequency Ordered    22 0900  insulin regular in 0.9 % NaCl 100 unit/100 mL (1 unit/mL) infusion        Question Answer Comment   Insulin Rate Adjustment (DO NOT MODIFY ANSWER) [email or web address] [id number]    Enter initial dose from Infusion Protocol Chart (Units/hr): 2.5         -- IV Continuous 07/06/22 0746             PHYSICAL EXAMINATION:  Vitals:    07/06/22 1215   BP:    Pulse: 100   Resp: (!) 30   Temp:      Body mass index is 16.31 kg/m².    Physical Exam  Constitutional: Well developed, well nourished, NAD.  ENT: External ears no masses with nose patent; normal hearing.  Neck: Supple; trachea midline.  Cardiovascular: Normal heart sounds, no LE edema. DP +2 bilaterally.  Lungs: Normal effort; lungs anterior bilaterally clear to auscultation.  Abdomen: Soft, no masses, no hernias.  MS: No clubbing or cyanosis of nails noted; unable to assess gait.  Skin: No rashes, lesions, or ulcers; no nodules. Injection sites are ok. No lipo hypertropthy or atrophy.  Psychiatric: Good judgement and insight; normal mood and affect.  Neurological: Cranial nerves are grossly intact.   Foot: Nails in good condition, no amputations noted.

## 2022-07-06 NOTE — SUBJECTIVE & OBJECTIVE
Interval History: Overnight some difficulty swallowing, got extra dose of IV steroid. C/o dyspnea and wheezing but no chest pain or abd pain.     Review of Systems   Constitutional: Negative for malaise/fatigue.   HENT:  Negative for congestion.    Eyes:  Negative for pain.   Cardiovascular:  Positive for dyspnea on exertion. Negative for chest pain and irregular heartbeat.   Respiratory:  Positive for cough, shortness of breath and wheezing.    Endocrine: Negative for cold intolerance and heat intolerance.   Musculoskeletal:  Negative for back pain and falls.   Objective:     Vital Signs (Most Recent):  Temp: 97.9 °F (36.6 °C) (07/06/22 1505)  Pulse: 91 (07/06/22 1645)  Resp: (!) 23 (07/06/22 1645)  BP: 137/72 (07/06/22 1600)  SpO2: 96 % (07/06/22 1645)   Vital Signs (24h Range):  Temp:  [97.9 °F (36.6 °C)-98.3 °F (36.8 °C)] 97.9 °F (36.6 °C)  Pulse:  [] 91  Resp:  [12-41] 23  SpO2:  [90 %-99 %] 96 %  BP: (100-190)/() 137/72     Weight: 44.5 kg (98 lb)  Body mass index is 16.31 kg/m².     SpO2: 96 %  O2 Device (Oxygen Therapy): nasal cannula      Intake/Output Summary (Last 24 hours) at 7/6/2022 1758  Last data filed at 7/6/2022 1700  Gross per 24 hour   Intake 265.44 ml   Output 1150 ml   Net -884.56 ml       Lines/Drains/Airways       Drain  Duration             Female External Urinary Catheter 07/05/22 1819 <1 day              Peripheral Intravenous Line  Duration                  Peripheral IV - Single Lumen 07/05/22 1335 22 G Right Antecubital 1 day         Peripheral IV - Single Lumen 07/05/22 1500 22 G Left Antecubital 1 day                    Physical Exam  Nursing note reviewed.   Constitutional:       General: She is not in acute distress.     Appearance: She is not ill-appearing.   HENT:      Head: Normocephalic and atraumatic.   Eyes:      Pupils: Pupils are equal, round, and reactive to light.   Cardiovascular:      Rate and Rhythm: Normal rate.      Heart sounds: No murmur  heard.  Pulmonary:      Effort: Pulmonary effort is normal.      Breath sounds: Wheezing present.   Abdominal:      General: Abdomen is flat.   Musculoskeletal:      Right lower leg: No edema.      Left lower leg: No edema.   Skin:     General: Skin is warm.      Capillary Refill: Capillary refill takes less than 2 seconds.   Neurological:      General: No focal deficit present.       Significant Labs: All pertinent lab results from the last 24 hours have been reviewed.    Significant Imaging:  Reviewed

## 2022-07-06 NOTE — PLAN OF CARE
CM MET WITH THE PT AND HER SPOUSE TO DISCUSS DISCHARGE PLANNING PT STATES THAT THEY LIVE IN A 1 STORY HOUSE SHE IS NOT ON DIALYSIS AND DOES NOT TAKE COUMADIN SHE DOES HAVE TRANSPORTATION HOME  [facility] [address] [facility]  [phone]    [facility]  Initial Discharge Assessment       Primary Care Provider: EZ Casillas MD    Admission Diagnosis: Chest tightness [R07.89]  SOB (shortness of breath) [R06.02]  EKG abnormalities [R94.31]  NSTEMI (non-ST elevated myocardial infarction) [I21.4]  Exacerbation of asthma, unspecified asthma severity, unspecified whether persistent [J45.901]    Admission Date: 7/5/2022  Expected Discharge Date: 7/10/2022    Discharge Barriers Identified: None    Payor: MEDICARE / Plan: MEDICARE PART A & B / Product Type: Government /     Extended Emergency Contact Information  Primary Emergency Contact: Chuck Costa  Address: [address] [place] [address]  Home Phone: [phone]  Work Phone: [phone]  Mobile Phone: [phone]  Relation: Spouse    Discharge Plan A: Home, Home with family  Discharge Plan B: Home, Home with family, Home Health      [facility] - [place], [place] - [address] AT Portal to Registered [facility] Sites  [address]  [facility] [address]  Phone: [phone] Fax: [phone]    [facility] [address] [facility]  [address] [facility] [address]  Phone: [phone] Fax: [phone]      Initial Assessment (most recent)     Adult Discharge Assessment - 07/06/22 0931        Discharge Assessment    Assessment Type Discharge Planning Assessment     Confirmed/corrected address, phone number and insurance Yes     Confirmed Demographics Correct on Facesheet     Source of Information patient;family     When was your last doctors appointment? 02/15/22     Communicated ANTOINE with patient/caregiver Date not available/Unable to determine      Lives With spouse     Do you expect to return to your current living situation? Yes     Do you have help at home or someone to help you manage your care at home? Yes     Prior to hospitilization cognitive status: Alert/Oriented     Current cognitive status: Alert/Oriented     Walking or Climbing Stairs Difficulty none     Dressing/Bathing Difficulty none     Home Layout Able to live on 1st floor     Equipment Currently Used at Home nebulizer     Readmission within 30 days? No     Patient currently being followed by outpatient case management? No     Do you currently have service(s) that help you manage your care at home? No     Do you take prescription medications? Yes     Do you have prescription coverage? No     Do you have any problems affording any of your prescribed medications? No     Is the patient taking medications as prescribed? yes     Who is going to help you get home at discharge? SPOUSE CRYSTAL ORTEGA      How do you get to doctors appointments? family or friend will provide;car, drives self     Are you on dialysis? No     Do you take coumadin? No     Discharge Plan A Home;Home with family     Discharge Plan B Home;Home with family;Home Health     DME Needed Upon Discharge  none     Discharge Plan discussed with: Spouse/sig other;Patient     Discharge Barriers Identified None                        EZ DICK  PONADIR /SPOUSE CRYSTAL ORTEGA

## 2022-07-06 NOTE — NURSING
MD Alcira notified pt Glucose on morning labs 468 and finger stick 364. Orders to give one time dose of 3 Units aspart SubQ. Read back for verification.  MD notified Mg 1.5. Orders to give 400mg Magnesium oxide PO. Read back for verification

## 2022-07-06 NOTE — NURSING
MD Jordin notified pt states she is having trouble swallowing while awaiting downstairs for CT. Returned to room, unable to obtain CT at this time. 80 mg methylprednisolone ordered and given PRN albuterol treatment given.

## 2022-07-07 PROBLEM — I16.0 HYPERTENSIVE URGENCY: Status: ACTIVE | Noted: 2022-07-07

## 2022-07-07 PROBLEM — R07.89 CHEST TIGHTNESS: Status: ACTIVE | Noted: 2022-07-07

## 2022-07-07 LAB
ALBUMIN SERPL BCP-MCNC: 2.8 G/DL (ref 3.5–5.2)
ALP SERPL-CCNC: 54 U/L (ref 55–135)
ALT SERPL W/O P-5'-P-CCNC: 18 U/L (ref 10–44)
ANION GAP SERPL CALC-SCNC: 7 MMOL/L (ref 8–16)
AST SERPL-CCNC: 17 U/L (ref 10–40)
BASOPHILS # BLD AUTO: 0.02 K/UL (ref 0–0.2)
BASOPHILS NFR BLD: 0.1 % (ref 0–1.9)
BILIRUB SERPL-MCNC: 0.7 MG/DL (ref 0.1–1)
BUN SERPL-MCNC: 21 MG/DL (ref 8–23)
CALCIUM SERPL-MCNC: 9 MG/DL (ref 8.7–10.5)
CHLORIDE SERPL-SCNC: 105 MMOL/L (ref 95–110)
CO2 SERPL-SCNC: 27 MMOL/L (ref 23–29)
CREAT SERPL-MCNC: 0.7 MG/DL (ref 0.5–1.4)
DIFFERENTIAL METHOD: ABNORMAL
EOSINOPHIL # BLD AUTO: 0 K/UL (ref 0–0.5)
EOSINOPHIL NFR BLD: 0 % (ref 0–8)
ERYTHROCYTE [DISTWIDTH] IN BLOOD BY AUTOMATED COUNT: 15.2 % (ref 11.5–14.5)
EST. GFR  (AFRICAN AMERICAN): >60 ML/MIN/1.73 M^2
EST. GFR  (NON AFRICAN AMERICAN): >60 ML/MIN/1.73 M^2
GLUCOSE SERPL-MCNC: 119 MG/DL (ref 70–110)
HCT VFR BLD AUTO: 37.3 % (ref 37–48.5)
HGB BLD-MCNC: 12.4 G/DL (ref 12–16)
IMM GRANULOCYTES # BLD AUTO: 0.05 K/UL (ref 0–0.04)
IMM GRANULOCYTES NFR BLD AUTO: 0.3 % (ref 0–0.5)
LYMPHOCYTES # BLD AUTO: 1.1 K/UL (ref 1–4.8)
LYMPHOCYTES NFR BLD: 7.5 % (ref 18–48)
MAGNESIUM SERPL-MCNC: 1.7 MG/DL (ref 1.6–2.6)
MCH RBC QN AUTO: 31.2 PG (ref 27–31)
MCHC RBC AUTO-ENTMCNC: 33.2 G/DL (ref 32–36)
MCV RBC AUTO: 94 FL (ref 82–98)
MONOCYTES # BLD AUTO: 0.4 K/UL (ref 0.3–1)
MONOCYTES NFR BLD: 2.9 % (ref 4–15)
NEUTROPHILS # BLD AUTO: 13.1 K/UL (ref 1.8–7.7)
NEUTROPHILS NFR BLD: 89.2 % (ref 38–73)
NRBC BLD-RTO: 0 /100 WBC
PLATELET # BLD AUTO: 256 K/UL (ref 150–450)
PMV BLD AUTO: 11.9 FL (ref 9.2–12.9)
POCT GLUCOSE: 124 MG/DL (ref 70–110)
POCT GLUCOSE: 139 MG/DL (ref 70–110)
POCT GLUCOSE: 139 MG/DL (ref 70–110)
POCT GLUCOSE: 160 MG/DL (ref 70–110)
POCT GLUCOSE: 175 MG/DL (ref 70–110)
POTASSIUM SERPL-SCNC: 3.1 MMOL/L (ref 3.5–5.1)
PROT SERPL-MCNC: 5.8 G/DL (ref 6–8.4)
RBC # BLD AUTO: 3.98 M/UL (ref 4–5.4)
SODIUM SERPL-SCNC: 139 MMOL/L (ref 136–145)
WBC # BLD AUTO: 14.66 K/UL (ref 3.9–12.7)

## 2022-07-07 PROCEDURE — 20600001 HC STEP DOWN PRIVATE ROOM

## 2022-07-07 PROCEDURE — 94640 AIRWAY INHALATION TREATMENT: CPT

## 2022-07-07 PROCEDURE — 80053 COMPREHEN METABOLIC PANEL: CPT | Performed by: STUDENT IN AN ORGANIZED HEALTH CARE EDUCATION/TRAINING PROGRAM

## 2022-07-07 PROCEDURE — 25000003 PHARM REV CODE 250: Performed by: INTERNAL MEDICINE

## 2022-07-07 PROCEDURE — 99232 PR SUBSEQUENT HOSPITAL CARE,LEVL II: ICD-10-PCS | Mod: ,,, | Performed by: HOSPITALIST

## 2022-07-07 PROCEDURE — 99232 SBSQ HOSP IP/OBS MODERATE 35: CPT | Mod: ,,, | Performed by: NURSE PRACTITIONER

## 2022-07-07 PROCEDURE — 63600175 PHARM REV CODE 636 W HCPCS: Performed by: NURSE PRACTITIONER

## 2022-07-07 PROCEDURE — 25000242 PHARM REV CODE 250 ALT 637 W/ HCPCS: Performed by: STUDENT IN AN ORGANIZED HEALTH CARE EDUCATION/TRAINING PROGRAM

## 2022-07-07 PROCEDURE — 99900035 HC TECH TIME PER 15 MIN (STAT)

## 2022-07-07 PROCEDURE — 25000242 PHARM REV CODE 250 ALT 637 W/ HCPCS: Performed by: EMERGENCY MEDICINE

## 2022-07-07 PROCEDURE — 83735 ASSAY OF MAGNESIUM: CPT | Performed by: STUDENT IN AN ORGANIZED HEALTH CARE EDUCATION/TRAINING PROGRAM

## 2022-07-07 PROCEDURE — 63600175 PHARM REV CODE 636 W HCPCS: Performed by: STUDENT IN AN ORGANIZED HEALTH CARE EDUCATION/TRAINING PROGRAM

## 2022-07-07 PROCEDURE — 94761 N-INVAS EAR/PLS OXIMETRY MLT: CPT

## 2022-07-07 PROCEDURE — 99232 PR SUBSEQUENT HOSPITAL CARE,LEVL II: ICD-10-PCS | Mod: ,,, | Performed by: NURSE PRACTITIONER

## 2022-07-07 PROCEDURE — 85025 COMPLETE CBC W/AUTO DIFF WBC: CPT | Performed by: STUDENT IN AN ORGANIZED HEALTH CARE EDUCATION/TRAINING PROGRAM

## 2022-07-07 PROCEDURE — 27000221 HC OXYGEN, UP TO 24 HOURS

## 2022-07-07 PROCEDURE — 99232 SBSQ HOSP IP/OBS MODERATE 35: CPT | Mod: ,,, | Performed by: HOSPITALIST

## 2022-07-07 PROCEDURE — 25000003 PHARM REV CODE 250: Performed by: STUDENT IN AN ORGANIZED HEALTH CARE EDUCATION/TRAINING PROGRAM

## 2022-07-07 PROCEDURE — 36415 COLL VENOUS BLD VENIPUNCTURE: CPT | Performed by: STUDENT IN AN ORGANIZED HEALTH CARE EDUCATION/TRAINING PROGRAM

## 2022-07-07 RX ADMIN — VALSARTAN 160 MG: 160 TABLET, FILM COATED ORAL at 08:07

## 2022-07-07 RX ADMIN — CLOPIDOGREL 75 MG: 75 TABLET, FILM COATED ORAL at 08:07

## 2022-07-07 RX ADMIN — METHYLPREDNISOLONE SODIUM SUCCINATE 80 MG: 40 INJECTION, POWDER, FOR SOLUTION INTRAMUSCULAR; INTRAVENOUS at 02:07

## 2022-07-07 RX ADMIN — HEPARIN SODIUM 5000 UNITS: 5000 INJECTION INTRAVENOUS; SUBCUTANEOUS at 10:07

## 2022-07-07 RX ADMIN — IPRATROPIUM BROMIDE AND ALBUTEROL SULFATE 3 ML: 2.5; .5 SOLUTION RESPIRATORY (INHALATION) at 10:07

## 2022-07-07 RX ADMIN — PANCRELIPASE 1 CAPSULE: 24000; 76000; 120000 CAPSULE, DELAYED RELEASE PELLETS ORAL at 05:07

## 2022-07-07 RX ADMIN — CARVEDILOL 12.5 MG: 12.5 TABLET, FILM COATED ORAL at 08:07

## 2022-07-07 RX ADMIN — MUPIROCIN: 20 OINTMENT TOPICAL at 08:07

## 2022-07-07 RX ADMIN — LATANOPROST 1 DROP: 50 SOLUTION OPHTHALMIC at 10:07

## 2022-07-07 RX ADMIN — INSULIN ASPART 7 UNITS: 100 INJECTION, SOLUTION INTRAVENOUS; SUBCUTANEOUS at 05:07

## 2022-07-07 RX ADMIN — MUPIROCIN: 20 OINTMENT TOPICAL at 10:07

## 2022-07-07 RX ADMIN — IPRATROPIUM BROMIDE AND ALBUTEROL SULFATE 3 ML: 2.5; .5 SOLUTION RESPIRATORY (INHALATION) at 07:07

## 2022-07-07 RX ADMIN — METHYLPREDNISOLONE SODIUM SUCCINATE 80 MG: 40 INJECTION, POWDER, FOR SOLUTION INTRAMUSCULAR; INTRAVENOUS at 10:07

## 2022-07-07 RX ADMIN — SPIRONOLACTONE 25 MG: 25 TABLET, FILM COATED ORAL at 08:07

## 2022-07-07 RX ADMIN — HEPARIN SODIUM 5000 UNITS: 5000 INJECTION INTRAVENOUS; SUBCUTANEOUS at 06:07

## 2022-07-07 RX ADMIN — PANCRELIPASE 1 CAPSULE: 24000; 76000; 120000 CAPSULE, DELAYED RELEASE PELLETS ORAL at 11:07

## 2022-07-07 RX ADMIN — METHYLPREDNISOLONE SODIUM SUCCINATE 80 MG: 40 INJECTION, POWDER, FOR SOLUTION INTRAMUSCULAR; INTRAVENOUS at 06:07

## 2022-07-07 RX ADMIN — INSULIN ASPART 6 UNITS: 100 INJECTION, SOLUTION INTRAVENOUS; SUBCUTANEOUS at 08:07

## 2022-07-07 RX ADMIN — ATORVASTATIN CALCIUM 80 MG: 20 TABLET, FILM COATED ORAL at 10:07

## 2022-07-07 RX ADMIN — IPRATROPIUM BROMIDE AND ALBUTEROL SULFATE 3 ML: 2.5; .5 SOLUTION RESPIRATORY (INHALATION) at 02:07

## 2022-07-07 RX ADMIN — FLUTICASONE FUROATE AND VILANTEROL TRIFENATATE 1 PUFF: 200; 25 POWDER RESPIRATORY (INHALATION) at 07:07

## 2022-07-07 RX ADMIN — PANCRELIPASE 1 CAPSULE: 24000; 76000; 120000 CAPSULE, DELAYED RELEASE PELLETS ORAL at 08:07

## 2022-07-07 RX ADMIN — HEPARIN SODIUM 5000 UNITS: 5000 INJECTION INTRAVENOUS; SUBCUTANEOUS at 02:07

## 2022-07-07 RX ADMIN — SENNOSIDES AND DOCUSATE SODIUM 1 TABLET: 50; 8.6 TABLET ORAL at 08:07

## 2022-07-07 RX ADMIN — CARVEDILOL 12.5 MG: 12.5 TABLET, FILM COATED ORAL at 05:07

## 2022-07-07 RX ADMIN — INSULIN ASPART 6 UNITS: 100 INJECTION, SOLUTION INTRAVENOUS; SUBCUTANEOUS at 11:07

## 2022-07-07 NOTE — SUBJECTIVE & OBJECTIVE
"Interval HPI:   Overnight events:   BG stable and within goal while on current IV/SQ transition insulin protocol. Patient has stepped down on IV Insulin infusion overnight. Diet diabetic [facility]; 1500 Calorie; Consistent Carbohydrate       Eatin%  Nausea: No  Hypoglycemia and intervention: No  Fever: No  TPN and/or TF: No  If yes, type of TF/TPN and rate: None    BP (!) 187/81 (BP Location: Left arm, Patient Position: Lying)   Pulse 88   Temp 98.1 °F (36.7 °C) (Oral)   Resp 20   Ht 5' 5" (1.651 m)   Wt 44.5 kg (98 lb)   SpO2 95%   BMI 16.31 kg/m²     Labs Reviewed and Include    Recent Labs   Lab 22  0710   *   CALCIUM 9.0   ALBUMIN 2.8*   PROT 5.8*      K 3.1*   CO2 27      BUN 21   CREATININE 0.7   ALKPHOS 54*   ALT 18   AST 17   BILITOT 0.7     Lab Results   Component Value Date    WBC 14.66 (H) 2022    HGB 12.4 2022    HCT 37.3 2022    MCV 94 2022     2022     Recent Labs   Lab 22  1316   TSH 2.897     Lab Results   Component Value Date    HGBA1C 8.2 (H) 2022       Nutritional status:   Body mass index is 16.31 kg/m².  Lab Results   Component Value Date    ALBUMIN 2.8 (L) 2022    ALBUMIN 2.9 (L) 2022    ALBUMIN 3.5 2022     No results found for: PREALBUMIN    Estimated Creatinine Clearance: 41.3 mL/min (based on SCr of 0.7 mg/dL).    Accu-Checks  Recent Labs     22  1423 22  1458 22  1603 22  1655 22  1819 22  1937 22  2147 22  0231 22  0739 22  1140   POCTGLUCOSE 198* 189* 165* 115* 156* 165* 160* 139* 124* 160*       Current Medications and/or Treatments Impacting Glycemic Control  Immunotherapy:    Immunosuppressants       None          Steroids:   Hormones (From admission, onward)                Start     Stop Route Frequency Ordered    22 1145  methylPREDNISolone sodium succinate injection 80 mg         -- IV Every 8 hours " 07/06/22 1142    07/05/22 1448  melatonin tablet 6 mg         -- Oral Nightly PRN 07/05/22 1349          Pressors:    Autonomic Drugs (From admission, onward)                Start     Stop Route Frequency Ordered    07/05/22 1717  EPINEPHrine injection 0.3 mg         -- IM As needed (PRN) 07/05/22 1618          Hyperglycemia/Diabetes Medications:   Antihyperglycemics (From admission, onward)                Start     Stop Route Frequency Ordered    07/07/22 1130  insulin aspart U-100 pen 6 Units         -- SubQ with lunch 07/06/22 1911 07/07/22 0715  insulin aspart U-100 pen 6 Units         -- SubQ with breakfast 07/06/22 1911 07/06/22 2015  insulin regular in 0.9 % NaCl 100 unit/100 mL (1 unit/mL) infusion        Question:  Enter initial dose (Units/hr):  Answer:  1.2    -- IV Continuous 07/06/22 1911 07/06/22 2011  insulin aspart U-100 pen 0-4 Units         -- SubQ As needed (PRN) 07/06/22 1911 07/06/22 1915  insulin aspart U-100 pen 7 Units         -- SubQ with dinner 07/06/22 1914

## 2022-07-07 NOTE — NURSING TRANSFER
Nursing Transfer Note      7/6/2022     Reason patient is being transferred: Stepped down from ICU    Transfer From: SICU [id number]    Transfer via wheelchair    Transfer with O2, cardiac monitoring    Transported by *Nurse    Medicines sent: Yes    Any special needs or follow-up needed: No    Chart send with patient: Yes    Notified: spouse- done by pt    Patient reassessed at: 2200 on 7/6/2022     Upon arrival to floor: cardiac monitor applied, patient oriented to room, call bell in reach and bed in lowest position

## 2022-07-07 NOTE — CARE UPDATE
Endocrine update:    BG now within goal ranges on IV intensive insulin protocol.     Plan:  -Discontinue IV intensive insulin protocol  -Start Transition IV insulin infusion at 1.2 u/hr with stepdown parameters (Rate based on current IV insulin requirements)  -Start Novolog 6 units with breakfast/lunch and 7 units with dinner (home dosing) Anticipate will need to increase given steroid therapy  -Low Dose Correction  -BG monitoring /hs/0200  -Diet diabetic [facility]; 1500 Calorie; Consistent Carbohydrate      ** Please call Endocrine for any BG related issues **

## 2022-07-07 NOTE — PROGRESS NOTES
"[facility] - Tele[facility] Stepdown  Endocrinology  Progress Note    Admit Date: 2022     Reason for Consult: Management of T2DM, Hyperglycemia     Surgical Procedure and Date: N/A    Diabetes diagnosis year: about 25 years ago    Home Diabetes Medications:  Novolog 6 units with breakfast and lunch, 7 units with dinner, Levemir 3 units BID     How often checking glucose at home?  4x daily    BG readings on regimen: mostly above 200  Hypoglycemia on the regimen?  No  Missed doses on regimen?  No    Diabetes Complications include:     Hyperglycemia    Complicating diabetes co morbidities:   HTN, HLD      HPI:   Patient is a 86 y.o. female with a diagnosis of hypertension, hyperlipidemia, diabetes mellitus, and asthma who presents to hospital secondary to dyspnea associated chest tightness. In the ED, patient was mildly hypertensive with systolic pressure 150s initially increased to 180s.  EKG on admission showing STEMI with sinus rhythm with ST elevations and T-wave inversions anteriorly however repeat EKG similar to baseline (2021) showing sinus rhythm with incomplete right bundle branch block and T-wave inversions anteriorly..  Patient is asymptomatic currently denies any dyspnea or chest tightness/pain, or pressure.  Admitted to SICU. Patient last seen by Dr. Martines at [facility] endocrine clinic for DM management on 22.    Lab Results   Component Value Date    HGBA1C 8.2 (H) 2022              Interval HPI:   Overnight events:   BG stable and within goal while on current IV/SQ transition insulin protocol. Patient has stepped down on IV Insulin infusion overnight. Diet diabetic [facility]; 1500 Calorie; Consistent Carbohydrate       Eatin%  Nausea: No  Hypoglycemia and intervention: No  Fever: No  TPN and/or TF: No  If yes, type of TF/TPN and rate: None    BP (!) 187/81 (BP Location: Left arm, Patient Position: Lying)   Pulse 88   Temp 98.1 °F (36.7 °C) (Oral)   Resp 20   Ht 5' 5" (1.651 m)   Wt " 44.5 kg (98 lb)   SpO2 95%   BMI 16.31 kg/m²     Labs Reviewed and Include    Recent Labs   Lab 07/07/22  0710   *   CALCIUM 9.0   ALBUMIN 2.8*   PROT 5.8*      K 3.1*   CO2 27      BUN 21   CREATININE 0.7   ALKPHOS 54*   ALT 18   AST 17   BILITOT 0.7     Lab Results   Component Value Date    WBC 14.66 (H) 07/07/2022    HGB 12.4 07/07/2022    HCT 37.3 07/07/2022    MCV 94 07/07/2022     07/07/2022     Recent Labs   Lab 07/05/22  1316   TSH 2.897     Lab Results   Component Value Date    HGBA1C 8.2 (H) 07/05/2022       Nutritional status:   Body mass index is 16.31 kg/m².  Lab Results   Component Value Date    ALBUMIN 2.8 (L) 07/07/2022    ALBUMIN 2.9 (L) 07/06/2022    ALBUMIN 3.5 07/05/2022     No results found for: PREALBUMIN    Estimated Creatinine Clearance: 41.3 mL/min (based on SCr of 0.7 mg/dL).    Accu-Checks  Recent Labs     07/06/22  1423 07/06/22  1458 07/06/22  1603 07/06/22  1655 07/06/22  1819 07/06/22  1937 07/06/22  2147 07/07/22  0231 07/07/22  0739 07/07/22  1140   POCTGLUCOSE 198* 189* 165* 115* 156* 165* 160* 139* 124* 160*       Current Medications and/or Treatments Impacting Glycemic Control  Immunotherapy:    Immunosuppressants       None          Steroids:   Hormones (From admission, onward)                Start     Stop Route Frequency Ordered    07/06/22 1145  methylPREDNISolone sodium succinate injection 80 mg         -- IV Every 8 hours 07/06/22 1142    07/05/22 1448  melatonin tablet 6 mg         -- Oral Nightly PRN 07/05/22 1349          Pressors:    Autonomic Drugs (From admission, onward)                Start     Stop Route Frequency Ordered    07/05/22 1717  EPINEPHrine injection 0.3 mg         -- IM As needed (PRN) 07/05/22 1618          Hyperglycemia/Diabetes Medications:   Antihyperglycemics (From admission, onward)                Start     Stop Route Frequency Ordered    07/07/22 1130  insulin aspart U-100 pen 6 Units         -- SubQ with lunch 07/06/22  1911 07/07/22 0715  insulin aspart U-100 pen 6 Units         -- SubQ with breakfast 07/06/22 1911 07/06/22 2015  insulin regular in 0.9 % NaCl 100 unit/100 mL (1 unit/mL) infusion        Question:  Enter initial dose (Units/hr):  Answer:  1.2    -- IV Continuous 07/06/22 1911 07/06/22 2011  insulin aspart U-100 pen 0-4 Units         -- SubQ As needed (PRN) 07/06/22 1911 07/06/22 1915  insulin aspart U-100 pen 7 Units         -- SubQ with dinner 07/06/22 1914            ASSESSMENT and PLAN    * Dyspnea  Managed per primary team        Type 2 diabetes mellitus with hyperglycemia, with long-term current use of insulin  BG goal 140 - 180     - Rewrite transition IV insulin infusion with stepdown parameters. Initial rate starting at 1 unit/hr. Patient has stepped down on IV insulin infusion parameters overnight.   - Continue Novolog 6 units with breakfast and lunch, and 7 units with dinner.   - Low Dose SQ Insulin Correction Scale PRN subsequent hyperglycemia.   - BG Monitoring AC/HS     ** Please call Endocrine for any BG related issues **  ** Please notify Endocrine for any change and/or advance in diet**    Lab Results   Component Value Date    HGBA1C 8.2 (H) 07/05/2022       Discharge Planning:   TBD. Please notify endocrinology prior to discharge.               Hyperlipidemia  May increase insulin resistance.         HTN (hypertension)  Managed per primary team  Condition may cause insulin resistance         Adverse effect of corticosteroids  Glucocorticoids markedly increase glucoses. Expect increase insulin requirements while receiving steroids.             Clement Quick, NP  Endocrinology  [facility] - Telemetry Stepdown

## 2022-07-07 NOTE — NURSING TRANSFER
Nursing Transfer Note      7/6/2022     Reason patient is being transferred: step down    Transfer To: [id number]    Transfer via wheelchair    Transfer with  O2, cardiac monitoring    Transported by RNx1, PCTx    Medicines sent: muprocin, eye drops, inhaler, epi, aspirin trial meds    Any special needs or follow-up needed: no    Chart send with patient: Yes    Notified: spouse    Patient reassessed at: 2000, 07/06/22     Upon arrival to floor: cardiac monitor applied, patient oriented to room, call bell in reach and bed in lowest position

## 2022-07-07 NOTE — PLAN OF CARE
Went over POC with pt arrived to our unit.  Questions asked and answered.  Stated understanding of POC.  Pt remained AAO x 4 throughout shift.  No complaints of pain.  Oxygen saturation remained 93% and above on 2L NC.  Insulin gtt decreased to 1 unit/hr, for blood glucose 139 at 0230.  Denies needs at this time.  Will continue to monitor.

## 2022-07-07 NOTE — PLAN OF CARE
Problem: Adult Inpatient Plan of Care  Goal: Plan of Care Review  Outcome: Ongoing, Progressing   AAOX4.   at bedside.  VSS.  No c/o pain at this time.  Plan of care reviewed and discussed with patient.  Verbalized understanding.  Non productive cough noted.  Encouraged patient to sit up in bed and frequent position changes off her sacral area.  Bed in low position, call light in place. WCTM.

## 2022-07-07 NOTE — ASSESSMENT & PLAN NOTE
BG goal 140 - 180     - Rewrite transition IV insulin infusion with stepdown parameters. Initial rate starting at 1 unit/hr. Patient has stepped down on IV insulin infusion parameters overnight.   - Continue Novolog 6 units with breakfast and lunch, and 7 units with dinner.   - Low Dose SQ Insulin Correction Scale PRN subsequent hyperglycemia.   - BG Monitoring AC/HS     ** Please call Endocrine for any BG related issues **  ** Please notify Endocrine for any change and/or advance in diet**    Lab Results   Component Value Date    HGBA1C 8.2 (H) 07/05/2022       Discharge Planning:   TBD. Please notify endocrinology prior to discharge.

## 2022-07-08 LAB
ALBUMIN SERPL BCP-MCNC: 2.6 G/DL (ref 3.5–5.2)
ALP SERPL-CCNC: 52 U/L (ref 55–135)
ALT SERPL W/O P-5'-P-CCNC: 19 U/L (ref 10–44)
ANION GAP SERPL CALC-SCNC: 8 MMOL/L (ref 8–16)
AST SERPL-CCNC: 20 U/L (ref 10–40)
BASOPHILS # BLD AUTO: 0 K/UL (ref 0–0.2)
BASOPHILS NFR BLD: 0 % (ref 0–1.9)
BILIRUB SERPL-MCNC: 0.4 MG/DL (ref 0.1–1)
BUN SERPL-MCNC: 24 MG/DL (ref 8–23)
CALCIUM SERPL-MCNC: 9 MG/DL (ref 8.7–10.5)
CHLORIDE SERPL-SCNC: 105 MMOL/L (ref 95–110)
CO2 SERPL-SCNC: 29 MMOL/L (ref 23–29)
CREAT SERPL-MCNC: 0.7 MG/DL (ref 0.5–1.4)
DIFFERENTIAL METHOD: ABNORMAL
EOSINOPHIL # BLD AUTO: 0 K/UL (ref 0–0.5)
EOSINOPHIL NFR BLD: 0 % (ref 0–8)
ERYTHROCYTE [DISTWIDTH] IN BLOOD BY AUTOMATED COUNT: 15.3 % (ref 11.5–14.5)
EST. GFR  (AFRICAN AMERICAN): >60 ML/MIN/1.73 M^2
EST. GFR  (NON AFRICAN AMERICAN): >60 ML/MIN/1.73 M^2
GLUCOSE SERPL-MCNC: 187 MG/DL (ref 70–110)
HCT VFR BLD AUTO: 38.4 % (ref 37–48.5)
HGB BLD-MCNC: 12.2 G/DL (ref 12–16)
IMM GRANULOCYTES # BLD AUTO: 0.05 K/UL (ref 0–0.04)
IMM GRANULOCYTES NFR BLD AUTO: 0.4 % (ref 0–0.5)
LYMPHOCYTES # BLD AUTO: 0.8 K/UL (ref 1–4.8)
LYMPHOCYTES NFR BLD: 5.9 % (ref 18–48)
MAGNESIUM SERPL-MCNC: 1.9 MG/DL (ref 1.6–2.6)
MCH RBC QN AUTO: 30.7 PG (ref 27–31)
MCHC RBC AUTO-ENTMCNC: 31.8 G/DL (ref 32–36)
MCV RBC AUTO: 97 FL (ref 82–98)
MONOCYTES # BLD AUTO: 0.3 K/UL (ref 0.3–1)
MONOCYTES NFR BLD: 2.1 % (ref 4–15)
NEUTROPHILS # BLD AUTO: 11.9 K/UL (ref 1.8–7.7)
NEUTROPHILS NFR BLD: 91.6 % (ref 38–73)
NRBC BLD-RTO: 0 /100 WBC
PLATELET # BLD AUTO: 228 K/UL (ref 150–450)
PMV BLD AUTO: 12 FL (ref 9.2–12.9)
POCT GLUCOSE: 133 MG/DL (ref 70–110)
POCT GLUCOSE: 153 MG/DL (ref 70–110)
POCT GLUCOSE: 170 MG/DL (ref 70–110)
POCT GLUCOSE: 189 MG/DL (ref 70–110)
POCT GLUCOSE: 249 MG/DL (ref 70–110)
POTASSIUM SERPL-SCNC: 3.4 MMOL/L (ref 3.5–5.1)
PROT SERPL-MCNC: 5.6 G/DL (ref 6–8.4)
RBC # BLD AUTO: 3.98 M/UL (ref 4–5.4)
SODIUM SERPL-SCNC: 142 MMOL/L (ref 136–145)
WBC # BLD AUTO: 12.97 K/UL (ref 3.9–12.7)

## 2022-07-08 PROCEDURE — 99232 PR SUBSEQUENT HOSPITAL CARE,LEVL II: ICD-10-PCS | Mod: ,,, | Performed by: HOSPITALIST

## 2022-07-08 PROCEDURE — 99232 SBSQ HOSP IP/OBS MODERATE 35: CPT | Mod: ,,, | Performed by: HOSPITALIST

## 2022-07-08 PROCEDURE — 25000003 PHARM REV CODE 250: Performed by: STUDENT IN AN ORGANIZED HEALTH CARE EDUCATION/TRAINING PROGRAM

## 2022-07-08 PROCEDURE — 63600175 PHARM REV CODE 636 W HCPCS: Performed by: HOSPITALIST

## 2022-07-08 PROCEDURE — 80053 COMPREHEN METABOLIC PANEL: CPT | Performed by: STUDENT IN AN ORGANIZED HEALTH CARE EDUCATION/TRAINING PROGRAM

## 2022-07-08 PROCEDURE — 25000003 PHARM REV CODE 250: Performed by: INTERNAL MEDICINE

## 2022-07-08 PROCEDURE — 63600175 PHARM REV CODE 636 W HCPCS: Performed by: STUDENT IN AN ORGANIZED HEALTH CARE EDUCATION/TRAINING PROGRAM

## 2022-07-08 PROCEDURE — 99900035 HC TECH TIME PER 15 MIN (STAT)

## 2022-07-08 PROCEDURE — 83735 ASSAY OF MAGNESIUM: CPT | Performed by: STUDENT IN AN ORGANIZED HEALTH CARE EDUCATION/TRAINING PROGRAM

## 2022-07-08 PROCEDURE — 99232 PR SUBSEQUENT HOSPITAL CARE,LEVL II: ICD-10-PCS | Mod: ,,, | Performed by: NURSE PRACTITIONER

## 2022-07-08 PROCEDURE — 25000242 PHARM REV CODE 250 ALT 637 W/ HCPCS: Performed by: STUDENT IN AN ORGANIZED HEALTH CARE EDUCATION/TRAINING PROGRAM

## 2022-07-08 PROCEDURE — 99232 SBSQ HOSP IP/OBS MODERATE 35: CPT | Mod: ,,, | Performed by: NURSE PRACTITIONER

## 2022-07-08 PROCEDURE — 94761 N-INVAS EAR/PLS OXIMETRY MLT: CPT

## 2022-07-08 PROCEDURE — 36415 COLL VENOUS BLD VENIPUNCTURE: CPT | Performed by: STUDENT IN AN ORGANIZED HEALTH CARE EDUCATION/TRAINING PROGRAM

## 2022-07-08 PROCEDURE — 94640 AIRWAY INHALATION TREATMENT: CPT

## 2022-07-08 PROCEDURE — 85025 COMPLETE CBC W/AUTO DIFF WBC: CPT | Performed by: STUDENT IN AN ORGANIZED HEALTH CARE EDUCATION/TRAINING PROGRAM

## 2022-07-08 PROCEDURE — 27000221 HC OXYGEN, UP TO 24 HOURS

## 2022-07-08 PROCEDURE — 63600175 PHARM REV CODE 636 W HCPCS: Performed by: NURSE PRACTITIONER

## 2022-07-08 PROCEDURE — 20600001 HC STEP DOWN PRIVATE ROOM

## 2022-07-08 RX ORDER — FUROSEMIDE 20 MG/1
20 TABLET ORAL DAILY
Status: DISCONTINUED | OUTPATIENT
Start: 2022-07-09 | End: 2022-07-11 | Stop reason: HOSPADM

## 2022-07-08 RX ADMIN — SPIRONOLACTONE 25 MG: 25 TABLET, FILM COATED ORAL at 08:07

## 2022-07-08 RX ADMIN — ATORVASTATIN CALCIUM 80 MG: 20 TABLET, FILM COATED ORAL at 09:07

## 2022-07-08 RX ADMIN — METHYLPREDNISOLONE SODIUM SUCCINATE 80 MG: 40 INJECTION, POWDER, FOR SOLUTION INTRAMUSCULAR; INTRAVENOUS at 06:07

## 2022-07-08 RX ADMIN — SENNOSIDES AND DOCUSATE SODIUM 1 TABLET: 50; 8.6 TABLET ORAL at 08:07

## 2022-07-08 RX ADMIN — METHYLPREDNISOLONE SODIUM SUCCINATE 40 MG: 40 INJECTION, POWDER, FOR SOLUTION INTRAMUSCULAR; INTRAVENOUS at 09:07

## 2022-07-08 RX ADMIN — CARVEDILOL 12.5 MG: 12.5 TABLET, FILM COATED ORAL at 08:07

## 2022-07-08 RX ADMIN — PANCRELIPASE 1 CAPSULE: 24000; 76000; 120000 CAPSULE, DELAYED RELEASE PELLETS ORAL at 08:07

## 2022-07-08 RX ADMIN — PANCRELIPASE 1 CAPSULE: 24000; 76000; 120000 CAPSULE, DELAYED RELEASE PELLETS ORAL at 05:07

## 2022-07-08 RX ADMIN — LATANOPROST 1 DROP: 50 SOLUTION OPHTHALMIC at 10:07

## 2022-07-08 RX ADMIN — HEPARIN SODIUM 5000 UNITS: 5000 INJECTION INTRAVENOUS; SUBCUTANEOUS at 05:07

## 2022-07-08 RX ADMIN — INSULIN ASPART 7 UNITS: 100 INJECTION, SOLUTION INTRAVENOUS; SUBCUTANEOUS at 05:07

## 2022-07-08 RX ADMIN — CARVEDILOL 12.5 MG: 12.5 TABLET, FILM COATED ORAL at 05:07

## 2022-07-08 RX ADMIN — IPRATROPIUM BROMIDE AND ALBUTEROL SULFATE 3 ML: 2.5; .5 SOLUTION RESPIRATORY (INHALATION) at 08:07

## 2022-07-08 RX ADMIN — LEVOFLOXACIN 750 MG: 750 TABLET, FILM COATED ORAL at 08:07

## 2022-07-08 RX ADMIN — PANCRELIPASE 1 CAPSULE: 24000; 76000; 120000 CAPSULE, DELAYED RELEASE PELLETS ORAL at 12:07

## 2022-07-08 RX ADMIN — CLOPIDOGREL 75 MG: 75 TABLET, FILM COATED ORAL at 08:07

## 2022-07-08 RX ADMIN — HEPARIN SODIUM 5000 UNITS: 5000 INJECTION INTRAVENOUS; SUBCUTANEOUS at 09:07

## 2022-07-08 RX ADMIN — INSULIN ASPART 6 UNITS: 100 INJECTION, SOLUTION INTRAVENOUS; SUBCUTANEOUS at 12:07

## 2022-07-08 RX ADMIN — VALSARTAN 160 MG: 160 TABLET, FILM COATED ORAL at 08:07

## 2022-07-08 RX ADMIN — HEPARIN SODIUM 5000 UNITS: 5000 INJECTION INTRAVENOUS; SUBCUTANEOUS at 06:07

## 2022-07-08 RX ADMIN — METHYLPREDNISOLONE SODIUM SUCCINATE 40 MG: 40 INJECTION, POWDER, FOR SOLUTION INTRAMUSCULAR; INTRAVENOUS at 05:07

## 2022-07-08 RX ADMIN — MUPIROCIN: 20 OINTMENT TOPICAL at 08:07

## 2022-07-08 RX ADMIN — INSULIN ASPART 6 UNITS: 100 INJECTION, SOLUTION INTRAVENOUS; SUBCUTANEOUS at 08:07

## 2022-07-08 RX ADMIN — INSULIN ASPART 2 UNITS: 100 INJECTION, SOLUTION INTRAVENOUS; SUBCUTANEOUS at 02:07

## 2022-07-08 RX ADMIN — MUPIROCIN: 20 OINTMENT TOPICAL at 10:07

## 2022-07-08 RX ADMIN — IPRATROPIUM BROMIDE AND ALBUTEROL SULFATE 3 ML: 2.5; .5 SOLUTION RESPIRATORY (INHALATION) at 09:07

## 2022-07-08 RX ADMIN — SENNOSIDES AND DOCUSATE SODIUM 1 TABLET: 50; 8.6 TABLET ORAL at 09:07

## 2022-07-08 RX ADMIN — IPRATROPIUM BROMIDE AND ALBUTEROL SULFATE 3 ML: 2.5; .5 SOLUTION RESPIRATORY (INHALATION) at 03:07

## 2022-07-08 RX ADMIN — Medication 6 MG: at 09:07

## 2022-07-08 RX ADMIN — FLUTICASONE FUROATE AND VILANTEROL TRIFENATATE 1 PUFF: 200; 25 POWDER RESPIRATORY (INHALATION) at 08:07

## 2022-07-08 NOTE — ASSESSMENT & PLAN NOTE
BG goal 140 - 180     - Continue transition IV insulin infusion with stepdown parameters. Initial rate starting at 1 unit/hr. Patient has stepped down on IV insulin infusion parameters overnight.   - Continue Novolog 6 units with breakfast and lunch, and 7 units with dinner.   - Low Dose SQ Insulin Correction Scale PRN subsequent hyperglycemia.   - BG Monitoring AC/HS     ** Please call Endocrine for any BG related issues **  ** Please notify Endocrine for any change and/or advance in diet**    Lab Results   Component Value Date    HGBA1C 8.2 (H) 07/05/2022       Discharge Planning:   - PRN refill of Insurance preferred diabetes testing supplies  - Continue Levemir 3 units BID.   - Continue Novolog 6 units with breakfast and lunch, and 7 units with dinner.   - Low Dose SQ Insulin Correction Scale:     150 - 200 + 1 unit     201 - 250 + 2 units     251 - 300 + 3 units     301 - 350 + 4 units      > 350   + 5 units  - Patient is to follow-up with Dr. Martines with [facility] outpatient endocrinology clinic for continued DM management and care.

## 2022-07-08 NOTE — PROGRESS NOTES
[facility] - Tele[facility] Medicine  Progress Note    Patient Name: Sussy Costa  MRN: [id number]  Patient Class: IP- Inpatient   Admission Date: 7/5/2022  Length of Stay: 2 days  Attending Physician: Jenny Mckeon MD  Primary Care Provider: EZ Casillas MD        Subjective:     Principal Problem:Asthma with exacerbation        HPI:  This is an 85yo female with a h/o HTN, DIABETES MELITIS, asthma who presented to [facility] secondary to sudden onset dyspnea and chest pain on day of admission.  ECG demonstrated ST elevations anteriour leads.  Cardiology admitted patient and she was given asa, Plavix, and heparin.  Repeat ECG looked improved and the patient's chest pain resolved.  She was then transferred to hospitalist service for asthma.         Overview/Hospital Course:  Echocardiogram demonstrated normal LVEF with no WMA.  Troponin normal.  Patient placed on solu medrol, bronchodilators, inhaled corticosteroids.  CXR demonstrated no consolidation.        Interval History:   The patient still feels SOB, worse with exertion.  No CP.  Currently satting upper 80's.  Afebrile.     Review of Systems   Constitutional:  Positive for activity change and appetite change. Negative for fever.   Respiratory:  Positive for cough, chest tightness, shortness of breath and wheezing.    Cardiovascular:  Positive for chest pain. Negative for leg swelling.   Gastrointestinal:  Positive for nausea and vomiting. Negative for abdominal pain.   Skin:  Positive for rash and wound.   Allergic/Immunologic: Positive for environmental allergies.   Objective:     Vital Signs (Most Recent):  Temp: 97.7 °F (36.5 °C) (07/07/22 2055)  Pulse: 96 (07/07/22 2055)  Resp: 20 (07/07/22 2055)  BP: (!) 140/89 (07/07/22 2055)  SpO2: (!) 94 % (07/07/22 2055)   Vital Signs (24h Range):  Temp:  [97.6 °F (36.4 °C)-98.1 °F (36.7 °C)] 97.7 °F (36.5 °C)  Pulse:  [76-96] 96  Resp:  [18-20] 20  SpO2:  [88 %-95 %] 94 %  BP: (140-187)/(66-89) 140/89      Weight: 44.5 kg (98 lb)  Body mass index is 16.31 kg/m².    Intake/Output Summary (Last 24 hours) at 7/7/2022 2211  Last data filed at 7/7/2022 1740  Gross per 24 hour   Intake 1040 ml   Output 550 ml   Net 490 ml      Physical Exam  Constitutional:       Appearance: Normal appearance.   HENT:      Head: Normocephalic and atraumatic.      Nose: Nose normal.      Mouth/Throat:      Mouth: Mucous membranes are moist.      Pharynx: Oropharynx is clear.   Eyes:      Conjunctiva/sclera: Conjunctivae normal.      Pupils: Pupils are equal, round, and reactive to light.   Cardiovascular:      Rate and Rhythm: Normal rate and regular rhythm.   Pulmonary:      Effort: Pulmonary effort is normal.      Breath sounds: Wheezing present.      Comments: Course crackles on inspiration  Abdominal:      General: Bowel sounds are normal.      Palpations: Abdomen is soft.   Musculoskeletal:         General: No swelling.   Skin:     General: Skin is warm and dry.      Findings: No rash.   Neurological:      General: No focal deficit present.      Mental Status: She is alert and oriented to person, place, and time.   Psychiatric:         Mood and Affect: Mood normal.         Behavior: Behavior normal.       Significant Labs: All pertinent labs within the past 24 hours have been reviewed.    Significant Imaging: I have reviewed all pertinent imaging results/findings within the past 24 hours.      Assessment/Plan:      * Asthma with exacerbation  - bronchodilators, inhaled corticosteroids  - systemic steroids  - supplemental O2  - CXR on admission clear      Dyspnea  - secondary to asthma exacerbation  - supplemental O2 as needed      Chest tightness  Initial concern for stemi but cardiac enzymes normal      Hypertensive urgency  - treated with PRN BP meds on admission      Type 2 diabetes mellitus with hyperglycemia, with long-term current use of insulin  Continue home medication regimen and make adjustments as needed; especially since  patient on IV steroids.         VTE Risk Mitigation (From admission, onward)         Ordered     heparin (porcine) injection 5,000 Units  Every 8 hours         07/06/22 1142     IP VTE HIGH RISK PATIENT  Once         07/05/22 1349     Place sequential compression device  Until discontinued         07/05/22 1349                Discharge Planning   ANTOINE: 7/10/2022     Code Status: Full Code   Is the patient medically ready for discharge?:     Reason for patient still in hospital (select all that apply): Patient trending condition  Discharge Plan A: Home, Home with family                  Jenny Mckeon MD  Department of Hospital Medicine   [facility] - Telemetry Stepdown

## 2022-07-08 NOTE — SUBJECTIVE & OBJECTIVE
"Interval HPI:   Overnight events:   BG stable and remains within upper end of goal while on current IV/SQ Transition insulin protocol. Increase in insulin requirements most likely secondary to high dose steroid therapy. Endocrinology will continue to follow, and manage glycemic control while inpatient.   Diet diabetic [facility]; 1500 Calorie; Consistent Carbohydrate       Eatin%  Nausea: No  Hypoglycemia and intervention: No  Fever: No  TPN and/or TF: No  If yes, type of TF/TPN and rate: None    BP (!) 122/58 (BP Location: Left arm, Patient Position: Sitting)   Pulse 84   Temp 97.8 °F (36.6 °C) (Oral)   Resp 17   Ht 5' 5" (1.651 m)   Wt 44.5 kg (98 lb)   SpO2 97%   BMI 16.31 kg/m²     Labs Reviewed and Include    Recent Labs   Lab 22  0349   *   CALCIUM 9.0   ALBUMIN 2.6*   PROT 5.6*      K 3.4*   CO2 29      BUN 24*   CREATININE 0.7   ALKPHOS 52*   ALT 19   AST 20   BILITOT 0.4     Lab Results   Component Value Date    WBC 12.97 (H) 2022    HGB 12.2 2022    HCT 38.4 2022    MCV 97 2022     2022     Recent Labs   Lab 22  1316   TSH 2.897     Lab Results   Component Value Date    HGBA1C 8.2 (H) 2022       Nutritional status:   Body mass index is 16.31 kg/m².  Lab Results   Component Value Date    ALBUMIN 2.6 (L) 2022    ALBUMIN 2.8 (L) 2022    ALBUMIN 2.9 (L) 2022     No results found for: PREALBUMIN    Estimated Creatinine Clearance: 41.3 mL/min (based on SCr of 0.7 mg/dL).    Accu-Checks  Recent Labs     22  1819 22  1937 22  2147 22  0231 22  0739 22  1140 22  1715 22  2233 22  0206 22  0702   POCTGLUCOSE 156* 165* 160* 139* 124* 160* 139* 175* 249* 170*       Current Medications and/or Treatments Impacting Glycemic Control  Immunotherapy:    Immunosuppressants       None          Steroids:   Hormones (From admission, onward)                " Start     Stop Route Frequency Ordered    07/08/22 1400  methylPREDNISolone sodium succinate injection 40 mg         -- IV Every 8 hours 07/08/22 0822    07/05/22 1448  melatonin tablet 6 mg         -- Oral Nightly PRN 07/05/22 1349          Pressors:    Autonomic Drugs (From admission, onward)                Start     Stop Route Frequency Ordered    07/05/22 1717  EPINEPHrine injection 0.3 mg         -- IM As needed (PRN) 07/05/22 1618          Hyperglycemia/Diabetes Medications:   Antihyperglycemics (From admission, onward)                Start     Stop Route Frequency Ordered    07/07/22 1215  insulin regular in 0.9 % NaCl 100 unit/100 mL (1 unit/mL) infusion        Question:  Enter initial dose (Units/hr):  Answer:  1    -- IV Continuous 07/07/22 1210    07/07/22 1130  insulin aspart U-100 pen 6 Units         -- SubQ with lunch 07/06/22 1911 07/07/22 0715  insulin aspart U-100 pen 6 Units         -- SubQ with breakfast 07/06/22 1911 07/06/22 2011  insulin aspart U-100 pen 0-4 Units         -- SubQ As needed (PRN) 07/06/22 1911 07/06/22 1915  insulin aspart U-100 pen 7 Units         -- SubQ with dinner 07/06/22 1914

## 2022-07-08 NOTE — HOSPITAL COURSE
Echocardiogram demonstrated normal LVEF with no WMA.  Troponin normal.  Patient placed on solu medrol, bronchodilators, inhaled corticosteroids.  Remains on supplemental O2.  Repeat CXR demonstrates bibasiler opacities.  Previous specimen from bronchoscopy May 2022 grew pan-sensitive pseudomonas.    Now covering with levofloxacin. Pulmonary consulted secondary to patient's lack of improvement and continued bronchospasms and need for supplemental O2.  Patient then given treatments with nebulized saline, CPT, flutter valve.   Patient has improved since admission and is eager for discharge.  She will need to be discharged on home O2 and follow up with her pulmonologist as OP.

## 2022-07-08 NOTE — ASSESSMENT & PLAN NOTE
Continue home medication regimen and make adjustments as needed; especially since patient on IV steroids.

## 2022-07-08 NOTE — ASSESSMENT & PLAN NOTE
- bronchodilators, inhaled corticosteroids  - systemic steroids  - supplemental O2  - CXR on admission clear

## 2022-07-08 NOTE — HPI
This is an 87yo female with a h/o HTN, DIABETES MELITIS, asthma who presented to [facility] secondary to sudden onset dyspnea and chest pain on day of admission.  ECG demonstrated ST elevations anteriour leads.  Cardiology admitted patient and she was given asa, Plavix, and heparin.  Repeat ECG looked improved and the patient's chest pain resolved.  She was then transferred to hospitalist service for asthma.

## 2022-07-08 NOTE — PLAN OF CARE
[facility] - Tele[facility] Stepdown  Discharge Reassessment    Primary Care Provider: EZ Casillas MD    Expected Discharge Date: 7/10/2022    Reassessment (most recent)     Discharge Reassessment - 07/08/22 1316        Discharge Reassessment    Assessment Type Discharge Planning Reassessment     Discharge Plan A Home with family     Discharge Plan B Home Health     DME Needed Upon Discharge  other (see comments)   TBD    Discharge Barriers Identified None     Why the patient remains in the hospital Requires continued medical care        Post-Acute Status    Post-Acute Authorization Other     Other Status No Post-Acute Service Needs               Bonnie Umanzor RN  Ext [phone]

## 2022-07-08 NOTE — PLAN OF CARE
Problem: Adult Inpatient Plan of Care  Goal: Plan of Care Review  Outcome: Ongoing, Progressing     Problem: Adult Inpatient Plan of Care  Goal: Patient-Specific Goal (Individualized)  Outcome: Ongoing, Progressing     Problem: Adult Inpatient Plan of Care  Goal: Absence of Hospital-Acquired Illness or Injury  Outcome: Ongoing, Progressing     Problem: Adult Inpatient Plan of Care  Goal: Optimal Comfort and Wellbeing  Outcome: Ongoing, Progressing     AAOX4.  VSS.  Care plan reviewed and discussed.  Pt tolerated sitting in bedside chair for most of the day, visiting with family.  Chest xray completed.  Insulin gtt remains infusing at 0.8unit/hr as per protocol/orders.  NADN.  Purick draining clear yellow urine.  Safety measures in place and maintained.  WCTM.

## 2022-07-08 NOTE — PROGRESS NOTES
[facility] - Telemetry Stepdown  Endocrinology  Progress Note    Admit Date: 2022     Reason for Consult: Management of T2DM, Hyperglycemia     Surgical Procedure and Date: N/A    Diabetes diagnosis year: about 25 years ago    Home Diabetes Medications:  Novolog 6 units with breakfast and lunch, 7 units with dinner, Levemir 3 units BID     How often checking glucose at home?  4x daily    BG readings on regimen: mostly above 200  Hypoglycemia on the regimen?  No  Missed doses on regimen?  No    Diabetes Complications include:     Hyperglycemia    Complicating diabetes co morbidities:   HTN, HLD      HPI:   Patient is a 86 y.o. female with a diagnosis of hypertension, hyperlipidemia, diabetes mellitus, and asthma who presents to hospital secondary to dyspnea associated chest tightness. In the ED, patient was mildly hypertensive with systolic pressure 150s initially increased to 180s.  EKG on admission showing STEMI with sinus rhythm with ST elevations and T-wave inversions anteriorly however repeat EKG similar to baseline (2021) showing sinus rhythm with incomplete right bundle branch block and T-wave inversions anteriorly..  Patient is asymptomatic currently denies any dyspnea or chest tightness/pain, or pressure.  Admitted to SICU. Patient last seen by Dr. Martines at [facility] endocrine clinic for DM management on 22.    Lab Results   Component Value Date    HGBA1C 8.2 (H) 2022              Interval HPI:   Overnight events:   BG stable and remains within upper end of goal while on current IV/SQ Transition insulin protocol. Increase in insulin requirements most likely secondary to high dose steroid therapy. Endocrinology will continue to follow, and manage glycemic control while inpatient.   Diet diabetic [facility]; 1500 Calorie; Consistent Carbohydrate       Eatin%  Nausea: No  Hypoglycemia and intervention: No  Fever: No  TPN and/or TF: No  If yes, type of TF/TPN and rate: None    BP (!) 122/58  "(BP Location: Left arm, Patient Position: Sitting)   Pulse 84   Temp 97.8 °F (36.6 °C) (Oral)   Resp 17   Ht 5' 5" (1.651 m)   Wt 44.5 kg (98 lb)   SpO2 97%   BMI 16.31 kg/m²     Labs Reviewed and Include    Recent Labs   Lab 07/08/22  0349   *   CALCIUM 9.0   ALBUMIN 2.6*   PROT 5.6*      K 3.4*   CO2 29      BUN 24*   CREATININE 0.7   ALKPHOS 52*   ALT 19   AST 20   BILITOT 0.4     Lab Results   Component Value Date    WBC 12.97 (H) 07/08/2022    HGB 12.2 07/08/2022    HCT 38.4 07/08/2022    MCV 97 07/08/2022     07/08/2022     Recent Labs   Lab 07/05/22  1316   TSH 2.897     Lab Results   Component Value Date    HGBA1C 8.2 (H) 07/05/2022       Nutritional status:   Body mass index is 16.31 kg/m².  Lab Results   Component Value Date    ALBUMIN 2.6 (L) 07/08/2022    ALBUMIN 2.8 (L) 07/07/2022    ALBUMIN 2.9 (L) 07/06/2022     No results found for: PREALBUMIN    Estimated Creatinine Clearance: 41.3 mL/min (based on SCr of 0.7 mg/dL).    Accu-Checks  Recent Labs     07/06/22  1819 07/06/22  1937 07/06/22  2147 07/07/22  0231 07/07/22  0739 07/07/22  1140 07/07/22  1715 07/07/22  2233 07/08/22  0206 07/08/22  0702   POCTGLUCOSE 156* 165* 160* 139* 124* 160* 139* 175* 249* 170*       Current Medications and/or Treatments Impacting Glycemic Control  Immunotherapy:    Immunosuppressants       None          Steroids:   Hormones (From admission, onward)                Start     Stop Route Frequency Ordered    07/08/22 1400  methylPREDNISolone sodium succinate injection 40 mg         -- IV Every 8 hours 07/08/22 0822    07/05/22 1448  melatonin tablet 6 mg         -- Oral Nightly PRN 07/05/22 1349          Pressors:    Autonomic Drugs (From admission, onward)                Start     Stop Route Frequency Ordered    07/05/22 1717  EPINEPHrine injection 0.3 mg         -- IM As needed (PRN) 07/05/22 1618          Hyperglycemia/Diabetes Medications:   Antihyperglycemics (From admission, onward) "                Start     Stop Route Frequency Ordered    07/07/22 1215  insulin regular in 0.9 % NaCl 100 unit/100 mL (1 unit/mL) infusion        Question:  Enter initial dose (Units/hr):  Answer:  1    -- IV Continuous 07/07/22 1210    07/07/22 1130  insulin aspart U-100 pen 6 Units         -- SubQ with lunch 07/06/22 1911 07/07/22 0715  insulin aspart U-100 pen 6 Units         -- SubQ with breakfast 07/06/22 1911 07/06/22 2011  insulin aspart U-100 pen 0-4 Units         -- SubQ As needed (PRN) 07/06/22 1911 07/06/22 1915  insulin aspart U-100 pen 7 Units         -- SubQ with dinner 07/06/22 1914            ASSESSMENT and PLAN    * Asthma with exacerbation  Managed per primary team  Avoid hypoglycemia        Type 2 diabetes mellitus with hyperglycemia, with long-term current use of insulin  BG goal 140 - 180     - Continue transition IV insulin infusion with stepdown parameters. Initial rate starting at 1 unit/hr. Patient has stepped down on IV insulin infusion parameters overnight.   - Continue Novolog 6 units with breakfast and lunch, and 7 units with dinner.   - Low Dose SQ Insulin Correction Scale PRN subsequent hyperglycemia.   - BG Monitoring AC/HS     ** Please call Endocrine for any BG related issues **  ** Please notify Endocrine for any change and/or advance in diet**    Lab Results   Component Value Date    HGBA1C 8.2 (H) 07/05/2022       Discharge Planning:   - PRN refill of Insurance preferred diabetes testing supplies  - Continue Levemir 3 units BID.   - Continue Novolog 6 units with breakfast and lunch, and 7 units with dinner.   - Low Dose SQ Insulin Correction Scale:     150 - 200 + 1 unit     201 - 250 + 2 units     251 - 300 + 3 units     301 - 350 + 4 units      > 350   + 5 units  - Patient is to follow-up with Dr. Martines with [facility] [place]ology clinic for continued DM management and care.               Hyperlipidemia  May increase insulin resistance.         HTN  (hypertension)  Managed per primary team  Condition may cause insulin resistance         Adverse effect of corticosteroids  Glucocorticoids markedly increase glucoses. Expect increase insulin requirements while receiving steroids.               Clement Quick, NP  Endocrinology  [facility] - Telemetry Stepdown

## 2022-07-08 NOTE — SUBJECTIVE & OBJECTIVE
Interval History:   The patient still feels SOB, worse with exertion.  No CP.  Currently satting upper 80's.  Afebrile.     Review of Systems   Constitutional:  Positive for activity change and appetite change. Negative for fever.   Respiratory:  Positive for cough, chest tightness, shortness of breath and wheezing.    Cardiovascular:  Positive for chest pain. Negative for leg swelling.   Gastrointestinal:  Positive for nausea and vomiting. Negative for abdominal pain.   Skin:  Positive for rash and wound.   Allergic/Immunologic: Positive for environmental allergies.   Objective:     Vital Signs (Most Recent):  Temp: 97.7 °F (36.5 °C) (07/07/22 2055)  Pulse: 96 (07/07/22 2055)  Resp: 20 (07/07/22 2055)  BP: (!) 140/89 (07/07/22 2055)  SpO2: (!) 94 % (07/07/22 2055)   Vital Signs (24h Range):  Temp:  [97.6 °F (36.4 °C)-98.1 °F (36.7 °C)] 97.7 °F (36.5 °C)  Pulse:  [76-96] 96  Resp:  [18-20] 20  SpO2:  [88 %-95 %] 94 %  BP: (140-187)/(66-89) 140/89     Weight: 44.5 kg (98 lb)  Body mass index is 16.31 kg/m².    Intake/Output Summary (Last 24 hours) at 7/7/2022 2211  Last data filed at 7/7/2022 1740  Gross per 24 hour   Intake 1040 ml   Output 550 ml   Net 490 ml      Physical Exam  Constitutional:       Appearance: Normal appearance.   HENT:      Head: Normocephalic and atraumatic.      Nose: Nose normal.      Mouth/Throat:      Mouth: Mucous membranes are moist.      Pharynx: Oropharynx is clear.   Eyes:      Conjunctiva/sclera: Conjunctivae normal.      Pupils: Pupils are equal, round, and reactive to light.   Cardiovascular:      Rate and Rhythm: Normal rate and regular rhythm.   Pulmonary:      Effort: Pulmonary effort is normal.      Breath sounds: Wheezing present.      Comments: Course crackles on inspiration  Abdominal:      General: Bowel sounds are normal.      Palpations: Abdomen is soft.   Musculoskeletal:         General: No swelling.   Skin:     General: Skin is warm and dry.      Findings: No rash.    Neurological:      General: No focal deficit present.      Mental Status: She is alert and oriented to person, place, and time.   Psychiatric:         Mood and Affect: Mood normal.         Behavior: Behavior normal.       Significant Labs: All pertinent labs within the past 24 hours have been reviewed.    Significant Imaging: I have reviewed all pertinent imaging results/findings within the past 24 hours.

## 2022-07-08 NOTE — PLAN OF CARE
Went over POC with pt at beginning of shift.  Questions asked and answered.  Stated understanding of POC.  Pt remained AAO x 4 throughout shift.  No complaints of pain.  Instructions given on how to use IS with return demonstration to help with patient's oxygenation.  Oxygen saturation remained 93% and above on 2L NC.  Insulin gtt remained at 0.8 unit/hr.  2 units of sliding scale given at 0200 for blood glucose of 249. Denies needs at this time.  Will continue to monitor.

## 2022-07-09 LAB
ALBUMIN SERPL BCP-MCNC: 2.6 G/DL (ref 3.5–5.2)
ALP SERPL-CCNC: 51 U/L (ref 55–135)
ALT SERPL W/O P-5'-P-CCNC: 29 U/L (ref 10–44)
ANION GAP SERPL CALC-SCNC: 8 MMOL/L (ref 8–16)
AST SERPL-CCNC: 30 U/L (ref 10–40)
BASOPHILS # BLD AUTO: 0.01 K/UL (ref 0–0.2)
BASOPHILS NFR BLD: 0.1 % (ref 0–1.9)
BILIRUB SERPL-MCNC: 0.5 MG/DL (ref 0.1–1)
BUN SERPL-MCNC: 25 MG/DL (ref 8–23)
CALCIUM SERPL-MCNC: 8.8 MG/DL (ref 8.7–10.5)
CHLORIDE SERPL-SCNC: 103 MMOL/L (ref 95–110)
CO2 SERPL-SCNC: 31 MMOL/L (ref 23–29)
CREAT SERPL-MCNC: 0.7 MG/DL (ref 0.5–1.4)
DIFFERENTIAL METHOD: ABNORMAL
EOSINOPHIL # BLD AUTO: 0 K/UL (ref 0–0.5)
EOSINOPHIL NFR BLD: 0 % (ref 0–8)
ERYTHROCYTE [DISTWIDTH] IN BLOOD BY AUTOMATED COUNT: 14.9 % (ref 11.5–14.5)
EST. GFR  (AFRICAN AMERICAN): >60 ML/MIN/1.73 M^2
EST. GFR  (NON AFRICAN AMERICAN): >60 ML/MIN/1.73 M^2
GLUCOSE SERPL-MCNC: 145 MG/DL (ref 70–110)
HCT VFR BLD AUTO: 39.1 % (ref 37–48.5)
HGB BLD-MCNC: 12.3 G/DL (ref 12–16)
IMM GRANULOCYTES # BLD AUTO: 0.07 K/UL (ref 0–0.04)
IMM GRANULOCYTES NFR BLD AUTO: 0.6 % (ref 0–0.5)
LYMPHOCYTES # BLD AUTO: 1.1 K/UL (ref 1–4.8)
LYMPHOCYTES NFR BLD: 8.7 % (ref 18–48)
MAGNESIUM SERPL-MCNC: 1.8 MG/DL (ref 1.6–2.6)
MCH RBC QN AUTO: 29.8 PG (ref 27–31)
MCHC RBC AUTO-ENTMCNC: 31.5 G/DL (ref 32–36)
MCV RBC AUTO: 95 FL (ref 82–98)
MONOCYTES # BLD AUTO: 0.5 K/UL (ref 0.3–1)
MONOCYTES NFR BLD: 3.8 % (ref 4–15)
NEUTROPHILS # BLD AUTO: 10.8 K/UL (ref 1.8–7.7)
NEUTROPHILS NFR BLD: 86.8 % (ref 38–73)
NRBC BLD-RTO: 0 /100 WBC
PLATELET # BLD AUTO: 210 K/UL (ref 150–450)
PMV BLD AUTO: 11.9 FL (ref 9.2–12.9)
POCT GLUCOSE: 147 MG/DL (ref 70–110)
POCT GLUCOSE: 166 MG/DL (ref 70–110)
POCT GLUCOSE: 177 MG/DL (ref 70–110)
POCT GLUCOSE: 180 MG/DL (ref 70–110)
POCT GLUCOSE: 228 MG/DL (ref 70–110)
POTASSIUM SERPL-SCNC: 3.5 MMOL/L (ref 3.5–5.1)
PROT SERPL-MCNC: 5.5 G/DL (ref 6–8.4)
RBC # BLD AUTO: 4.13 M/UL (ref 4–5.4)
SODIUM SERPL-SCNC: 142 MMOL/L (ref 136–145)
WBC # BLD AUTO: 12.49 K/UL (ref 3.9–12.7)

## 2022-07-09 PROCEDURE — 94761 N-INVAS EAR/PLS OXIMETRY MLT: CPT

## 2022-07-09 PROCEDURE — 63600175 PHARM REV CODE 636 W HCPCS: Performed by: HOSPITALIST

## 2022-07-09 PROCEDURE — 99232 PR SUBSEQUENT HOSPITAL CARE,LEVL II: ICD-10-PCS | Mod: ,,, | Performed by: NURSE PRACTITIONER

## 2022-07-09 PROCEDURE — 25000003 PHARM REV CODE 250: Performed by: STUDENT IN AN ORGANIZED HEALTH CARE EDUCATION/TRAINING PROGRAM

## 2022-07-09 PROCEDURE — 25000003 PHARM REV CODE 250: Performed by: INTERNAL MEDICINE

## 2022-07-09 PROCEDURE — 99222 PR INITIAL HOSPITAL CARE,LEVL II: ICD-10-PCS | Mod: GC,,, | Performed by: INTERNAL MEDICINE

## 2022-07-09 PROCEDURE — 99232 PR SUBSEQUENT HOSPITAL CARE,LEVL II: ICD-10-PCS | Mod: ,,, | Performed by: HOSPITALIST

## 2022-07-09 PROCEDURE — 27000221 HC OXYGEN, UP TO 24 HOURS

## 2022-07-09 PROCEDURE — 63600175 PHARM REV CODE 636 W HCPCS: Performed by: STUDENT IN AN ORGANIZED HEALTH CARE EDUCATION/TRAINING PROGRAM

## 2022-07-09 PROCEDURE — 25000003 PHARM REV CODE 250: Performed by: HOSPITALIST

## 2022-07-09 PROCEDURE — 80053 COMPREHEN METABOLIC PANEL: CPT | Performed by: STUDENT IN AN ORGANIZED HEALTH CARE EDUCATION/TRAINING PROGRAM

## 2022-07-09 PROCEDURE — 83735 ASSAY OF MAGNESIUM: CPT | Performed by: STUDENT IN AN ORGANIZED HEALTH CARE EDUCATION/TRAINING PROGRAM

## 2022-07-09 PROCEDURE — 99232 SBSQ HOSP IP/OBS MODERATE 35: CPT | Mod: ,,, | Performed by: NURSE PRACTITIONER

## 2022-07-09 PROCEDURE — 25000003 PHARM REV CODE 250: Performed by: NURSE PRACTITIONER

## 2022-07-09 PROCEDURE — 36415 COLL VENOUS BLD VENIPUNCTURE: CPT | Performed by: STUDENT IN AN ORGANIZED HEALTH CARE EDUCATION/TRAINING PROGRAM

## 2022-07-09 PROCEDURE — 25000242 PHARM REV CODE 250 ALT 637 W/ HCPCS: Performed by: STUDENT IN AN ORGANIZED HEALTH CARE EDUCATION/TRAINING PROGRAM

## 2022-07-09 PROCEDURE — 99900035 HC TECH TIME PER 15 MIN (STAT)

## 2022-07-09 PROCEDURE — 99232 SBSQ HOSP IP/OBS MODERATE 35: CPT | Mod: ,,, | Performed by: HOSPITALIST

## 2022-07-09 PROCEDURE — 94640 AIRWAY INHALATION TREATMENT: CPT

## 2022-07-09 PROCEDURE — 85025 COMPLETE CBC W/AUTO DIFF WBC: CPT | Performed by: STUDENT IN AN ORGANIZED HEALTH CARE EDUCATION/TRAINING PROGRAM

## 2022-07-09 PROCEDURE — 99222 1ST HOSP IP/OBS MODERATE 55: CPT | Mod: GC,,, | Performed by: INTERNAL MEDICINE

## 2022-07-09 PROCEDURE — 20600001 HC STEP DOWN PRIVATE ROOM

## 2022-07-09 RX ORDER — FUROSEMIDE 10 MG/ML
40 INJECTION INTRAMUSCULAR; INTRAVENOUS ONCE
Status: COMPLETED | OUTPATIENT
Start: 2022-07-09 | End: 2022-07-09

## 2022-07-09 RX ADMIN — INSULIN ASPART 7 UNITS: 100 INJECTION, SOLUTION INTRAVENOUS; SUBCUTANEOUS at 04:07

## 2022-07-09 RX ADMIN — IPRATROPIUM BROMIDE AND ALBUTEROL SULFATE 3 ML: 2.5; .5 SOLUTION RESPIRATORY (INHALATION) at 09:07

## 2022-07-09 RX ADMIN — ATORVASTATIN CALCIUM 80 MG: 20 TABLET, FILM COATED ORAL at 09:07

## 2022-07-09 RX ADMIN — IPRATROPIUM BROMIDE AND ALBUTEROL SULFATE 3 ML: 2.5; .5 SOLUTION RESPIRATORY (INHALATION) at 08:07

## 2022-07-09 RX ADMIN — PANCRELIPASE 1 CAPSULE: 24000; 76000; 120000 CAPSULE, DELAYED RELEASE PELLETS ORAL at 09:07

## 2022-07-09 RX ADMIN — INSULIN ASPART 6 UNITS: 100 INJECTION, SOLUTION INTRAVENOUS; SUBCUTANEOUS at 12:07

## 2022-07-09 RX ADMIN — MUPIROCIN: 20 OINTMENT TOPICAL at 09:07

## 2022-07-09 RX ADMIN — CLOPIDOGREL 75 MG: 75 TABLET, FILM COATED ORAL at 09:07

## 2022-07-09 RX ADMIN — SENNOSIDES AND DOCUSATE SODIUM 1 TABLET: 50; 8.6 TABLET ORAL at 09:07

## 2022-07-09 RX ADMIN — METHYLPREDNISOLONE SODIUM SUCCINATE 40 MG: 40 INJECTION, POWDER, FOR SOLUTION INTRAMUSCULAR; INTRAVENOUS at 01:07

## 2022-07-09 RX ADMIN — PANCRELIPASE 1 CAPSULE: 24000; 76000; 120000 CAPSULE, DELAYED RELEASE PELLETS ORAL at 12:07

## 2022-07-09 RX ADMIN — PANCRELIPASE 1 CAPSULE: 24000; 76000; 120000 CAPSULE, DELAYED RELEASE PELLETS ORAL at 04:07

## 2022-07-09 RX ADMIN — HEPARIN SODIUM 5000 UNITS: 5000 INJECTION INTRAVENOUS; SUBCUTANEOUS at 04:07

## 2022-07-09 RX ADMIN — CARVEDILOL 12.5 MG: 12.5 TABLET, FILM COATED ORAL at 04:07

## 2022-07-09 RX ADMIN — LATANOPROST 1 DROP: 50 SOLUTION OPHTHALMIC at 09:07

## 2022-07-09 RX ADMIN — MUPIROCIN: 20 OINTMENT TOPICAL at 04:07

## 2022-07-09 RX ADMIN — FLUTICASONE FUROATE AND VILANTEROL TRIFENATATE 1 PUFF: 200; 25 POWDER RESPIRATORY (INHALATION) at 09:07

## 2022-07-09 RX ADMIN — METHYLPREDNISOLONE SODIUM SUCCINATE 40 MG: 40 INJECTION, POWDER, FOR SOLUTION INTRAMUSCULAR; INTRAVENOUS at 09:07

## 2022-07-09 RX ADMIN — FUROSEMIDE 20 MG: 20 TABLET ORAL at 09:07

## 2022-07-09 RX ADMIN — INSULIN ASPART 6 UNITS: 100 INJECTION, SOLUTION INTRAVENOUS; SUBCUTANEOUS at 07:07

## 2022-07-09 RX ADMIN — HEPARIN SODIUM 5000 UNITS: 5000 INJECTION INTRAVENOUS; SUBCUTANEOUS at 09:07

## 2022-07-09 RX ADMIN — CARVEDILOL 12.5 MG: 12.5 TABLET, FILM COATED ORAL at 09:07

## 2022-07-09 RX ADMIN — FUROSEMIDE 40 MG: 10 INJECTION, SOLUTION INTRAMUSCULAR; INTRAVENOUS at 01:07

## 2022-07-09 RX ADMIN — HEPARIN SODIUM 5000 UNITS: 5000 INJECTION INTRAVENOUS; SUBCUTANEOUS at 06:07

## 2022-07-09 RX ADMIN — SPIRONOLACTONE 25 MG: 25 TABLET, FILM COATED ORAL at 09:07

## 2022-07-09 RX ADMIN — METHYLPREDNISOLONE SODIUM SUCCINATE 40 MG: 40 INJECTION, POWDER, FOR SOLUTION INTRAMUSCULAR; INTRAVENOUS at 06:07

## 2022-07-09 RX ADMIN — VALSARTAN 160 MG: 160 TABLET, FILM COATED ORAL at 09:07

## 2022-07-09 RX ADMIN — INSULIN HUMAN 0.6 UNITS/HR: 1 INJECTION, SOLUTION INTRAVENOUS at 05:07

## 2022-07-09 RX ADMIN — INSULIN ASPART 1 UNITS: 100 INJECTION, SOLUTION INTRAVENOUS; SUBCUTANEOUS at 04:07

## 2022-07-09 NOTE — PLAN OF CARE
Problem: Adult Inpatient Plan of Care  Goal: Plan of Care Review  Outcome: Ongoing, Progressing   AAOX4.  Sitting up in bedside chair, watching tV.  Insulin gtt remains on .  Education and plan of care reviewed.  Verbalized understanding.  Safety measures in place and maintained.  WCTM.

## 2022-07-09 NOTE — PROGRESS NOTES
[facility] - Telemetry [facility] Medicine  Progress Note    Patient Name: Sussy Costa  MRN: [id number]  Patient Class: IP- Inpatient   Admission Date: 7/5/2022  Length of Stay: 4 days  Attending Physician: Jenny Mckeon MD  Primary Care Provider: EZ Casillas MD        Subjective:     Principal Problem:Exacerbation of asthma        HPI:  This is an 87yo female with a h/o HTN, DIABETES MELITIS, asthma who presented to [facility] secondary to sudden onset dyspnea and chest pain on day of admission.  ECG demonstrated ST elevations anteriour leads.  Cardiology admitted patient and she was given asa, Plavix, and heparin.  Repeat ECG looked improved and the patient's chest pain resolved.  She was then transferred to hospitalist service for asthma.         Overview/Hospital Course:  Echocardiogram demonstrated normal LVEF with no WMA.  Troponin normal.  Patient placed on solu medrol, bronchodilators, inhaled corticosteroids.  Remains on supplemental O2.  Repeat CXR demonstrates bibasiler opacities.        Interval History:   She is currently on 3LNC.  She feels less SOB but is requiring higher O2 flow.  Good UO with lasix.      Review of Systems   Constitutional:  Positive for activity change and fatigue. Negative for appetite change and fever.   HENT:  Negative for trouble swallowing.    Respiratory:  Positive for cough, shortness of breath and wheezing.    Cardiovascular:  Negative for chest pain and leg swelling.   Gastrointestinal:  Negative for abdominal pain, nausea and vomiting.   Genitourinary:  Negative for difficulty urinating.   Neurological:  Positive for weakness.   Objective:     Vital Signs (Most Recent):  Temp: 97.9 °F (36.6 °C) (07/09/22 1133)  Pulse: 61 (07/09/22 1133)  Resp: 18 (07/09/22 1133)  BP: (!) 144/63 (07/09/22 1133)  SpO2: (!) 93 % (07/09/22 1133)   Vital Signs (24h Range):  Temp:  [97.8 °F (36.6 °C)-98.7 °F (37.1 °C)] 97.9 °F (36.6 °C)  Pulse:  [61-90] 61  Resp:  [14-18] 18  SpO2:  [92  %-100 %] 93 %  BP: (134-148)/(60-69) 144/63     Weight: 44.5 kg (98 lb)  Body mass index is 16.31 kg/m².    Intake/Output Summary (Last 24 hours) at 7/9/2022 1622  Last data filed at 7/9/2022 0813  Gross per 24 hour   Intake --   Output 300 ml   Net -300 ml      Physical Exam    Significant Labs: All pertinent labs within the past 24 hours have been reviewed.  BMP:   Recent Labs   Lab 07/09/22  0713   *      K 3.5      CO2 31*   BUN 25*   CREATININE 0.7   CALCIUM 8.8   MG 1.8     CBC:   Recent Labs   Lab 07/08/22  0349 07/09/22  0712   WBC 12.97* 12.49   HGB 12.2 12.3   HCT 38.4 39.1    210       Significant Imaging: I have reviewed all pertinent imaging results/findings within the past 24 hours.      Assessment/Plan:      * Exacerbation of asthma  - bronchodilators, inhaled corticosteroids  - systemic steroids  - supplemental O2  - CXR demonstrates bibasiler opacities   - on abx therapy  - consult pulm secondary to not improving  - remains on 3LNC    Dyspnea  - secondary to asthma exacerbation  - supplemental O2 as needed      Chest tightness  Initial concern for stemi but cardiac enzymes normal  - has since resolved      Hypertensive urgency  - losartan, coreg  - monitor and adjust medication regimen as needed      Type 2 diabetes mellitus with hyperglycemia, with long-term current use of insulin  Continue home medication regimen and make adjustments as needed; especially since patient on IV steroids.   - premeal rapid acting insulin  - diabetic diet  - endocrine consulted        VTE Risk Mitigation (From admission, onward)         Ordered     heparin (porcine) injection 5,000 Units  Every 8 hours         07/06/22 1142     IP VTE HIGH RISK PATIENT  Once         07/05/22 1349     Place sequential compression device  Until discontinued         07/05/22 1349                Discharge Planning   ANTOINE: 7/10/2022     Code Status: Full Code   Is the patient medically ready for discharge?:      Reason for patient still in hospital (select all that apply): Treatment  Discharge Plan A: Home with family                  Jenny Mckeon MD  Department of Hospital Medicine   [facility] - Telemetry Stepdown

## 2022-07-09 NOTE — PROGRESS NOTES
[facility] - Telemetry Stepdown  Endocrinology  Progress Note    Admit Date: 2022     Reason for Consult: Management of T2DM, Hyperglycemia     Surgical Procedure and Date: N/A    Diabetes diagnosis year: about 25 years ago    Home Diabetes Medications:  Novolog 6 units with breakfast and lunch, 7 units with dinner, Levemir 3 units BID     How often checking glucose at home?  4x daily    BG readings on regimen: mostly above 200  Hypoglycemia on the regimen?  No  Missed doses on regimen?  No    Diabetes Complications include:     Hyperglycemia    Complicating diabetes co morbidities:   HTN, HLD      HPI:   Patient is a 86 y.o. female with a diagnosis of hypertension, hyperlipidemia, diabetes mellitus, and asthma who presents to hospital secondary to dyspnea associated chest tightness. In the ED, patient was mildly hypertensive with systolic pressure 150s initially increased to 180s.  EKG on admission showing STEMI with sinus rhythm with ST elevations and T-wave inversions anteriorly however repeat EKG similar to baseline (2021) showing sinus rhythm with incomplete right bundle branch block and T-wave inversions anteriorly..  Patient is asymptomatic currently denies any dyspnea or chest tightness/pain, or pressure.  Admitted to SICU. Patient last seen by Dr. Martines at [facility] endocrine clinic for DM management on 22.    Lab Results   Component Value Date    HGBA1C 8.2 (H) 2022              Interval HPI:   Overnight events:  BG stable while on current IV/SQ transition insulin protocol. Patient remains on steroid therapy. Endocrinology will continue to follow, and manage glycemic control while inpatient.   Diet diabetic [facility]; 1500 Calorie; Consistent Carbohydrate       Eatin% - 100%  Nausea: No  Hypoglycemia and intervention: No  Fever: No  TPN and/or TF: No  If yes, type of TF/TPN and rate: None    BP (!) 144/63 (BP Location: Left arm, Patient Position: Lying)   Pulse 61   Temp 97.9 °F  "(36.6 °C) (Oral)   Resp 18   Ht 5' 5" (1.651 m)   Wt 44.5 kg (98 lb)   SpO2 (!) 93%   BMI 16.31 kg/m²     Labs Reviewed and Include    Recent Labs   Lab 07/09/22  0713   *   CALCIUM 8.8   ALBUMIN 2.6*   PROT 5.5*      K 3.5   CO2 31*      BUN 25*   CREATININE 0.7   ALKPHOS 51*   ALT 29   AST 30   BILITOT 0.5     Lab Results   Component Value Date    WBC 12.49 07/09/2022    HGB 12.3 07/09/2022    HCT 39.1 07/09/2022    MCV 95 07/09/2022     07/09/2022     Recent Labs   Lab 07/05/22  1316   TSH 2.897     Lab Results   Component Value Date    HGBA1C 8.2 (H) 07/05/2022       Nutritional status:   Body mass index is 16.31 kg/m².  Lab Results   Component Value Date    ALBUMIN 2.6 (L) 07/09/2022    ALBUMIN 2.6 (L) 07/08/2022    ALBUMIN 2.8 (L) 07/07/2022     No results found for: PREALBUMIN    Estimated Creatinine Clearance: 41.3 mL/min (based on SCr of 0.7 mg/dL).    Accu-Checks  Recent Labs     07/07/22  1715 07/07/22  2233 07/08/22  0206 07/08/22  0702 07/08/22  1158 07/08/22  1712 07/08/22  2023 07/09/22  0032 07/09/22  0703 07/09/22  1144   POCTGLUCOSE 139* 175* 249* 170* 189* 153* 133* 180* 166* 177*       Current Medications and/or Treatments Impacting Glycemic Control  Immunotherapy:    Immunosuppressants       None          Steroids:   Hormones (From admission, onward)                Start     Stop Route Frequency Ordered    07/08/22 1400  methylPREDNISolone sodium succinate injection 40 mg         -- IV Every 8 hours 07/08/22 0822    07/05/22 1448  melatonin tablet 6 mg         -- Oral Nightly PRN 07/05/22 1349          Pressors:    Autonomic Drugs (From admission, onward)                Start     Stop Route Frequency Ordered    07/05/22 1717  EPINEPHrine injection 0.3 mg         -- IM As needed (PRN) 07/05/22 1618          Hyperglycemia/Diabetes Medications:   Antihyperglycemics (From admission, onward)                Start     Stop Route Frequency Ordered    07/07/22 1215  insulin " regular in 0.9 % NaCl 100 unit/100 mL (1 unit/mL) infusion        Question:  Enter initial dose (Units/hr):  Answer:  1    -- IV Continuous 07/07/22 1210    07/07/22 1130  insulin aspart U-100 pen 6 Units         -- SubQ with lunch 07/06/22 1911 07/07/22 0715  insulin aspart U-100 pen 6 Units         -- SubQ with breakfast 07/06/22 1911 07/06/22 2011  insulin aspart U-100 pen 0-4 Units         -- SubQ As needed (PRN) 07/06/22 1911 07/06/22 1915  insulin aspart U-100 pen 7 Units         -- SubQ with dinner 07/06/22 1914            ASSESSMENT and PLAN    * Exacerbation of asthma  Managed per primary team  Avoid hypoglycemia        Type 2 diabetes mellitus with hyperglycemia, with long-term current use of insulin  BG goal 140 - 180     - Reduce transition IV insulin infusion with stepdown parameters. Initial rate starting at 0.6 unit/hr. Patient has stepped down on IV insulin infusion parameters overnight.   - Continue Novolog 6 units with breakfast and lunch, and 7 units with dinner.   - Low Dose SQ Insulin Correction Scale PRN subsequent hyperglycemia.   - BG Monitoring AC/HS     ** Please call Endocrine for any BG related issues **  ** Please notify Endocrine for any change and/or advance in diet**    Lab Results   Component Value Date    HGBA1C 8.2 (H) 07/05/2022       Discharge Planning:   - PRN refill of Insurance preferred diabetes testing supplies  - Continue Levemir 3 units BID.   - Continue Novolog 6 units with breakfast and lunch, and 7 units with dinner.   - Low Dose SQ Insulin Correction Scale:     150 - 200 + 1 unit     201 - 250 + 2 units     251 - 300 + 3 units     301 - 350 + 4 units      > 350   + 5 units  - Patient is to follow-up with Dr. Martines with [facility] outpatient endocrinology clinic for continued DM management and care.               Hyperlipidemia  May increase insulin resistance.         HTN (hypertension)  Managed per primary team  Condition may cause insulin resistance         Adverse  effect of corticosteroids  Glucocorticoids markedly increase glucoses. Expect increase insulin requirements while receiving steroids.               Clement Quick, NP  Endocrinology  [facility] - Telemetry Stepdown

## 2022-07-09 NOTE — ASSESSMENT & PLAN NOTE
BG goal 140 - 180     - Reduce transition IV insulin infusion with stepdown parameters. Initial rate starting at 0.6 unit/hr. Patient has stepped down on IV insulin infusion parameters overnight.   - Continue Novolog 6 units with breakfast and lunch, and 7 units with dinner.   - Low Dose SQ Insulin Correction Scale PRN subsequent hyperglycemia.   - BG Monitoring AC/HS     ** Please call Endocrine for any BG related issues **  ** Please notify Endocrine for any change and/or advance in diet**    Lab Results   Component Value Date    HGBA1C 8.2 (H) 07/05/2022       Discharge Planning:   - PRN refill of Insurance preferred diabetes testing supplies  - Continue Levemir 3 units BID.   - Continue Novolog 6 units with breakfast and lunch, and 7 units with dinner.   - Low Dose SQ Insulin Correction Scale:     150 - 200 + 1 unit     201 - 250 + 2 units     251 - 300 + 3 units     301 - 350 + 4 units      > 350   + 5 units  - Patient is to [place] [facility] [place] for continued DM management and care.

## 2022-07-09 NOTE — ASSESSMENT & PLAN NOTE
- bronchodilators, inhaled corticosteroids  - systemic steroids  - supplemental O2  - CXR demonstrates bibasiler opacities   - on abx therapy  - consult pulm secondary to not improving  - remains on 3LNC

## 2022-07-09 NOTE — ASSESSMENT & PLAN NOTE
Continue home medication regimen and make adjustments as needed; especially since patient on IV steroids.   - premeal rapid acting insulin  - diabetic diet  - endocrine consulted

## 2022-07-09 NOTE — SUBJECTIVE & OBJECTIVE
"Interval HPI:   Overnight events:  BG stable while on current IV/SQ transition insulin protocol. Patient remains on steroid therapy. Endocrinology will continue to follow, and manage glycemic control while inpatient.   Diet diabetic [facility]; 1500 Calorie; Consistent Carbohydrate       Eatin% - 100%  Nausea: No  Hypoglycemia and intervention: No  Fever: No  TPN and/or TF: No  If yes, type of TF/TPN and rate: None    BP (!) 144/63 (BP Location: Left arm, Patient Position: Lying)   Pulse 61   Temp 97.9 °F (36.6 °C) (Oral)   Resp 18   Ht 5' 5" (1.651 m)   Wt 44.5 kg (98 lb)   SpO2 (!) 93%   BMI 16.31 kg/m²     Labs Reviewed and Include    Recent Labs   Lab 22  07   *   CALCIUM 8.8   ALBUMIN 2.6*   PROT 5.5*      K 3.5   CO2 31*      BUN 25*   CREATININE 0.7   ALKPHOS 51*   ALT 29   AST 30   BILITOT 0.5     Lab Results   Component Value Date    WBC 12.49 2022    HGB 12.3 2022    HCT 39.1 2022    MCV 95 2022     2022     Recent Labs   Lab 22  1316   TSH 2.897     Lab Results   Component Value Date    HGBA1C 8.2 (H) 2022       Nutritional status:   Body mass index is 16.31 kg/m².  Lab Results   Component Value Date    ALBUMIN 2.6 (L) 2022    ALBUMIN 2.6 (L) 2022    ALBUMIN 2.8 (L) 2022     No results found for: PREALBUMIN    Estimated Creatinine Clearance: 41.3 mL/min (based on SCr of 0.7 mg/dL).    Accu-Checks  Recent Labs     22  1715 22  2233 22  0206 22  0702 22  1158 22  1712 223 22  0032 22  0703 22  1144   POCTGLUCOSE 139* 175* 249* 170* 189* 153* 133* 180* 166* 177*       Current Medications and/or Treatments Impacting Glycemic Control  Immunotherapy:    Immunosuppressants       None          Steroids:   Hormones (From admission, onward)                Start     Stop Route Frequency Ordered    22 1400  methylPREDNISolone sodium " succinate injection 40 mg         -- IV Every 8 hours 07/08/22 0822    07/05/22 1448  melatonin tablet 6 mg         -- Oral Nightly PRN 07/05/22 1349          Pressors:    Autonomic Drugs (From admission, onward)                Start     Stop Route Frequency Ordered    07/05/22 1717  EPINEPHrine injection 0.3 mg         -- IM As needed (PRN) 07/05/22 1618          Hyperglycemia/Diabetes Medications:   Antihyperglycemics (From admission, onward)                Start     Stop Route Frequency Ordered    07/07/22 1215  insulin regular in 0.9 % NaCl 100 unit/100 mL (1 unit/mL) infusion        Question:  Enter initial dose (Units/hr):  Answer:  1    -- IV Continuous 07/07/22 1210    07/07/22 1130  insulin aspart U-100 pen 6 Units         -- SubQ with lunch 07/06/22 1911 07/07/22 0715  insulin aspart U-100 pen 6 Units         -- SubQ with breakfast 07/06/22 1911 07/06/22 2011  insulin aspart U-100 pen 0-4 Units         -- SubQ As needed (PRN) 07/06/22 1911 07/06/22 1915  insulin aspart U-100 pen 7 Units         -- SubQ with dinner 07/06/22 1914

## 2022-07-09 NOTE — SUBJECTIVE & OBJECTIVE
Interval History:   She is currently on 3LNC.  She feels less SOB but is requiring higher O2 flow.  Good UO with lasix.      Review of Systems   Constitutional:  Positive for activity change and fatigue. Negative for appetite change and fever.   HENT:  Negative for trouble swallowing.    Respiratory:  Positive for cough, shortness of breath and wheezing.    Cardiovascular:  Negative for chest pain and leg swelling.   Gastrointestinal:  Negative for abdominal pain, nausea and vomiting.   Genitourinary:  Negative for difficulty urinating.   Neurological:  Positive for weakness.   Objective:     Vital Signs (Most Recent):  Temp: 97.9 °F (36.6 °C) (07/09/22 1133)  Pulse: 61 (07/09/22 1133)  Resp: 18 (07/09/22 1133)  BP: (!) 144/63 (07/09/22 1133)  SpO2: (!) 93 % (07/09/22 1133)   Vital Signs (24h Range):  Temp:  [97.8 °F (36.6 °C)-98.7 °F (37.1 °C)] 97.9 °F (36.6 °C)  Pulse:  [61-90] 61  Resp:  [14-18] 18  SpO2:  [92 %-100 %] 93 %  BP: (134-148)/(60-69) 144/63     Weight: 44.5 kg (98 lb)  Body mass index is 16.31 kg/m².    Intake/Output Summary (Last 24 hours) at 7/9/2022 1622  Last data filed at 7/9/2022 0813  Gross per 24 hour   Intake --   Output 300 ml   Net -300 ml      Physical Exam    Significant Labs: All pertinent labs within the past 24 hours have been reviewed.  BMP:   Recent Labs   Lab 07/09/22  0713   *      K 3.5      CO2 31*   BUN 25*   CREATININE 0.7   CALCIUM 8.8   MG 1.8     CBC:   Recent Labs   Lab 07/08/22  0349 07/09/22  0712   WBC 12.97* 12.49   HGB 12.2 12.3   HCT 38.4 39.1    210       Significant Imaging: I have reviewed all pertinent imaging results/findings within the past 24 hours.

## 2022-07-09 NOTE — PLAN OF CARE
Problem: Adult Inpatient Plan of Care  Goal: Absence of Hospital-Acquired Illness or Injury  Intervention: Identify and Manage Fall Risk  Flowsheets (Taken 7/9/2022 0115)  Safety Promotion/Fall Prevention:   side rails raised x 2   Fall Risk reviewed with patient/family   medications reviewed  Intervention: Prevent Skin Injury  Flowsheets (Taken 7/9/2022 0115)  Body Position: position changed independently  Skin Protection: adhesive use limited  Intervention: Prevent and Manage VTE (Venous Thromboembolism) Risk  Flowsheets (Taken 7/9/2022 0115)  Activity Management: Arm raise - L1  VTE Prevention/Management:   bleeding precations maintained   bleeding risk assessed  Range of Motion: active ROM (range of motion) encouraged  Intervention: Prevent Infection  Flowsheets (Taken 7/9/2022 0115)  Infection Prevention:   hand hygiene promoted   environmental surveillance performed   rest/sleep promoted  Goal: Optimal Comfort and Wellbeing  Intervention: Monitor Pain and Promote Comfort  Flowsheets (Taken 7/9/2022 0115)  Pain Management Interventions: quiet environment facilitated  Intervention: Provide Person-Centered Care  Flowsheets (Taken 7/9/2022 0115)  Trust Relationship/Rapport:   care explained   questions answered   thoughts/feelings acknowledged   choices provided   emotional support provided   empathic listening provided   questions encouraged   reassurance provided   Pt in bed, resp even and unlabored.  No s/s of distress noted at this time.  BG being monitored Q4H. Safety measures in progress, call light within reach.

## 2022-07-09 NOTE — PROGRESS NOTES
[facility] - Telemetry [facility] Medicine  Progress Note    Patient Name: Sussy Costa  MRN: [id number]  Patient Class: IP- Inpatient   Admission Date: 7/5/2022  Length of Stay: 3 days  Attending Physician: Jenny Mckeon MD  Primary Care Provider: EZ Casillas MD        Subjective:     Principal Problem:Exacerbation of asthma        HPI:  This is an 85yo female with a h/o HTN, DIABETES MELITIS, asthma who presented to [facility] secondary to sudden onset dyspnea and chest pain on day of admission.  ECG demonstrated ST elevations anteriour leads.  Cardiology admitted patient and she was given asa, Plavix, and heparin.  Repeat ECG looked improved and the patient's chest pain resolved.  She was then transferred to hospitalist service for asthma.         Overview/Hospital Course:  Echocardiogram demonstrated normal LVEF with no WMA.  Troponin normal.  Patient placed on solu medrol, bronchodilators, inhaled corticosteroids.  Remains on supplemental O2.  Repeat CXR demonstrates bibasiler opacities.        Interval History:  Shortness of breath better but still present.  Continues to require supplemental O2.  SOB worse with exertion.     Review of Systems   Constitutional:  Positive for activity change. Negative for fever.   Respiratory:  Positive for cough, shortness of breath and wheezing.    Cardiovascular:  Negative for chest pain and leg swelling.   Gastrointestinal:  Negative for abdominal pain, nausea and vomiting.   Genitourinary:  Negative for difficulty urinating.   Neurological:  Positive for weakness. Negative for dizziness.   Objective:     Vital Signs (Most Recent):  Temp: 97.8 °F (36.6 °C) (07/08/22 1159)  Pulse: 85 (07/08/22 2028)  Resp: 17 (07/08/22 2028)  BP: 134/60 (07/08/22 1940)  SpO2: 96 % (07/08/22 2028) Vital Signs (24h Range):  Temp:  [97.8 °F (36.6 °C)-98.3 °F (36.8 °C)] 97.8 °F (36.6 °C)  Pulse:  [65-85] 85  Resp:  [16-22] 17  SpO2:  [92 %-100 %] 96 %  BP: (122-156)/(58-80) 134/60      Weight: 44.5 kg (98 lb)  Body mass index is 16.31 kg/m².    Intake/Output Summary (Last 24 hours) at 7/8/2022 2205  Last data filed at 7/8/2022 1600  Gross per 24 hour   Intake 600 ml   Output 1150 ml   Net -550 ml      Physical Exam  Constitutional:       General: She is not in acute distress.     Appearance: Normal appearance. She is normal weight.   HENT:      Head: Normocephalic and atraumatic.      Nose: Nose normal.      Mouth/Throat:      Mouth: Mucous membranes are moist.      Pharynx: Oropharynx is clear.   Cardiovascular:      Rate and Rhythm: Normal rate and regular rhythm.      Heart sounds: No murmur heard.  Pulmonary:      Effort: Pulmonary effort is normal.      Breath sounds: Wheezing present.   Abdominal:      General: Bowel sounds are normal.      Palpations: Abdomen is soft.   Musculoskeletal:         General: No swelling or deformity.   Skin:     General: Skin is warm and dry.   Neurological:      General: No focal deficit present.      Mental Status: She is alert and oriented to person, place, and time.   Psychiatric:         Mood and Affect: Mood normal.         Behavior: Behavior normal.       Significant Labs: All pertinent labs within the past 24 hours have been reviewed.    Significant Imaging: I have reviewed all pertinent imaging results/findings within the past 24 hours.      Assessment/Plan:      * Exacerbation of asthma  - bronchodilators, inhaled corticosteroids  - systemic steroids  - supplemental O2  - CXR demonstrates bibasiler opacities   - on abx therapy  - consult pulm    Dyspnea  - secondary to asthma exacerbation  - supplemental O2 as needed      Chest tightness  Initial concern for stemi but cardiac enzymes normal      Hypertensive urgency  - losartan, coreg      Type 2 diabetes mellitus with hyperglycemia, with long-term current use of insulin  Continue home medication regimen and make adjustments as needed; especially since patient on IV steroids.   - premeal rapid  acting insulin  - diabetic diet        VTE Risk Mitigation (From admission, onward)         Ordered     heparin (porcine) injection 5,000 Units  Every 8 hours         07/06/22 1142     IP VTE HIGH RISK PATIENT  Once         07/05/22 1349     Place sequential compression device  Until discontinued         07/05/22 1349                Discharge Planning   ANTOINE: 7/10/2022     Code Status: Full Code   Is the patient medically ready for discharge?:     Reason for patient still in hospital (select all that apply): Treatment  Discharge Plan A: Home with family                  Jenny Mckeon MD  Department of Hospital Medicine   [facility] - Telemetry Stepdown

## 2022-07-09 NOTE — SUBJECTIVE & OBJECTIVE
Interval History:  Shortness of breath better but still present.  Continues to require supplemental O2.  SOB worse with exertion.     Review of Systems   Constitutional:  Positive for activity change. Negative for fever.   Respiratory:  Positive for cough, shortness of breath and wheezing.    Cardiovascular:  Negative for chest pain and leg swelling.   Gastrointestinal:  Negative for abdominal pain, nausea and vomiting.   Genitourinary:  Negative for difficulty urinating.   Neurological:  Positive for weakness. Negative for dizziness.   Objective:     Vital Signs (Most Recent):  Temp: 97.8 °F (36.6 °C) (07/08/22 1159)  Pulse: 85 (07/08/22 2028)  Resp: 17 (07/08/22 2028)  BP: 134/60 (07/08/22 1940)  SpO2: 96 % (07/08/22 2028) Vital Signs (24h Range):  Temp:  [97.8 °F (36.6 °C)-98.3 °F (36.8 °C)] 97.8 °F (36.6 °C)  Pulse:  [65-85] 85  Resp:  [16-22] 17  SpO2:  [92 %-100 %] 96 %  BP: (122-156)/(58-80) 134/60     Weight: 44.5 kg (98 lb)  Body mass index is 16.31 kg/m².    Intake/Output Summary (Last 24 hours) at 7/8/2022 2205  Last data filed at 7/8/2022 1600  Gross per 24 hour   Intake 600 ml   Output 1150 ml   Net -550 ml      Physical Exam  Constitutional:       General: She is not in acute distress.     Appearance: Normal appearance. She is normal weight.   HENT:      Head: Normocephalic and atraumatic.      Nose: Nose normal.      Mouth/Throat:      Mouth: Mucous membranes are moist.      Pharynx: Oropharynx is clear.   Cardiovascular:      Rate and Rhythm: Normal rate and regular rhythm.      Heart sounds: No murmur heard.  Pulmonary:      Effort: Pulmonary effort is normal.      Breath sounds: Wheezing present.   Abdominal:      General: Bowel sounds are normal.      Palpations: Abdomen is soft.   Musculoskeletal:         General: No swelling or deformity.   Skin:     General: Skin is warm and dry.   Neurological:      General: No focal deficit present.      Mental Status: She is alert and oriented to person,  place, and time.   Psychiatric:         Mood and Affect: Mood normal.         Behavior: Behavior normal.       Significant Labs: All pertinent labs within the past 24 hours have been reviewed.    Significant Imaging: I have reviewed all pertinent imaging results/findings within the past 24 hours.

## 2022-07-09 NOTE — CONSULTS
[facility]  Initial Consult Note      Primary Attending:  Jenny Mckeon MD   Consultant Attending: Dr. Marquez   Consultant Fellow: Gertrude Willis MD       Chief Complaint/Reason for Consult      Asthma exacerbation     History of Present Illness      86yoF h/o HTN, DM, asthma who presented for cp and sob. Cardiology initially admitted the patient due to concerning EKG with trop elevation. Troponin peaked and EKG normalized. The patient was transferred to hospital medicine for management of asthma. The patient had increased O2 requirements of 2-3L here.     Patient states her breathing is feeling improved today. 98% on 2L NC.      Past Medical History      Medical:  has a past medical history of Asthma, Cyst of pancreas, Diabetes mellitus type II, Eczema of hand, Glaucoma, Hyperlipidemia, Hypertension, Lichen planus, Osteoporosis, and Pulmonary nodules.    Surgical:  has a past surgical history that includes Hysterectomy; Cholecystectomy; nasal polyps; Cataract extraction, bilateral; Epidural steroid injection into lumbar spine (N/A, 11/8/2018); Functional endoscopic sinus surgery (FESS) using computer-assisted navigation (N/A, 6/17/2019); Endoscopic ultrasound of upper gastrointestinal tract (N/A, 4/22/2022); and Bronchoscopy (N/A, 5/13/2022).    Family: family history includes Heart disease in her brother and father; Hypertension in her brother.    Social:  reports that she has never smoked. She has never used smokeless tobacco. She reports current alcohol use. She reports that she does not use drugs.    Allergies and Medications reviewed     Review of Systems      · Other than those symptoms mentioned above, an extensive review of systems was unremarkable.  · A complete review of systems was not obtainable due to the current medical condition of the patient.       On Examination     Vital Signs   Temp:  [97.8 °F (36.6 °C)-98.7 °F (37.1 °C)]   Pulse:  [61-90]   Resp:  [14-18]    BP: (134-148)/(60-69)   SpO2:  [92 %-100 %]    Physical Exam   GENERAL: very thin female, sitting in chair watching TV.    HEENT: Pupils are equally round and briskly reactive to light. Extraocular muscles are grossly intact. Oral mucous membranes are moist without lesions.    NECK: The patient has no noted JVD. No adenopathy is appreciated.    CHEST/LUNGS:her lung with adventitious sounds on inspiration and wheezing on expiration diffusely. Good air movement. Symmetric.     HEART: The patient has a regular rate and rhythm. No murmurs, rubs, or gallops are appreciated. Distal pulses are 2+. .    ABDOMEN: The patients abdomen is completely soft, nontender, and nondistended. Bowel sounds are present. No organomegaly is appreciated. No masses are appreciated. There are no peritoneal signs.    EXTREMITIES: The patient has no peripheral edema. There is no focal long bone tenderness or deformity.    SKIN: The patients skin is warm and dry, without rashes or lesions.    PSYCHIATRIC: The patient has normal mental status and has an appropriate affect.    NEUROLOGIC: The patient has equal strength in both upper and lower extremities without focal deficit. There are no gross deficits to the cranial nerves.       All recent labs and imaging studies have been reviewed    Pertinent findings include:    PFT's FVC (5/11/2022): 1.35 L 54% And Fev-1: 0.73 L 54%      I have personally and independently interpretted the following tests:  CXR: hyperinflated, LLL opacification   Bronch 5/13/2022 done for persistent cough. CT noted posterior basilar segment of left lung is airless. The pt complaining of persistent cough.     Findings: Left lower lobe mass? with atelectasis of the LLL. Culture grew pan-sensitive pseudomonas and moraxella     Assessment and Plan / Recommendations     Sussy Costa    · Asthma exacerbation   · Continue bronchodilators and inhaled corticosteroids   · Prednisone 40mg po daily x 5d   · Wean O2 as able    · Continue levoquin for CAP and pseudomonal coverage per prior culture.   · Will re-evaluate tomorrow and discuss need for home O2  · Pulmonary follow up as outpatient.   · HTN  · Management per primary team  · Diabetes    Management per primary team    This plan was discussed with staff pulmonologist Dr. Marquez    Thank you for the opportunity to be involved in Ms. Costa's care.    Please call or message directly through EPIC with any additional questions or concerns.    Gertrude Willis MD  Emergency Medicine Staff   Critical Care Fellow  5:40 PM      07/09/2022  5:40 PM

## 2022-07-09 NOTE — ASSESSMENT & PLAN NOTE
Continue home medication regimen and make adjustments as needed; especially since patient on IV steroids.   - premeal rapid acting insulin  - diabetic diet

## 2022-07-09 NOTE — ASSESSMENT & PLAN NOTE
- bronchodilators, inhaled corticosteroids  - systemic steroids  - supplemental O2  - CXR demonstrates bibasiler opacities   - on abx therapy  - consult pulm

## 2022-07-10 PROBLEM — J15.1 PNEUMONIA OF LEFT LOWER LOBE DUE TO PSEUDOMONAS SPECIES: Status: ACTIVE | Noted: 2022-07-10

## 2022-07-10 LAB
ALBUMIN SERPL BCP-MCNC: 2.5 G/DL (ref 3.5–5.2)
ALP SERPL-CCNC: 51 U/L (ref 55–135)
ALT SERPL W/O P-5'-P-CCNC: 58 U/L (ref 10–44)
ANION GAP SERPL CALC-SCNC: 8 MMOL/L (ref 8–16)
AST SERPL-CCNC: 63 U/L (ref 10–40)
BASOPHILS # BLD AUTO: 0.01 K/UL (ref 0–0.2)
BASOPHILS NFR BLD: 0.1 % (ref 0–1.9)
BILIRUB SERPL-MCNC: 0.4 MG/DL (ref 0.1–1)
BUN SERPL-MCNC: 27 MG/DL (ref 8–23)
CALCIUM SERPL-MCNC: 8.2 MG/DL (ref 8.7–10.5)
CHLORIDE SERPL-SCNC: 104 MMOL/L (ref 95–110)
CO2 SERPL-SCNC: 26 MMOL/L (ref 23–29)
CREAT SERPL-MCNC: 0.7 MG/DL (ref 0.5–1.4)
DIFFERENTIAL METHOD: ABNORMAL
EOSINOPHIL # BLD AUTO: 0 K/UL (ref 0–0.5)
EOSINOPHIL NFR BLD: 0 % (ref 0–8)
ERYTHROCYTE [DISTWIDTH] IN BLOOD BY AUTOMATED COUNT: 14.8 % (ref 11.5–14.5)
EST. GFR  (AFRICAN AMERICAN): >60 ML/MIN/1.73 M^2
EST. GFR  (NON AFRICAN AMERICAN): >60 ML/MIN/1.73 M^2
GLUCOSE SERPL-MCNC: 228 MG/DL (ref 70–110)
HCT VFR BLD AUTO: 38.3 % (ref 37–48.5)
HGB BLD-MCNC: 12.2 G/DL (ref 12–16)
IMM GRANULOCYTES # BLD AUTO: 0.05 K/UL (ref 0–0.04)
IMM GRANULOCYTES NFR BLD AUTO: 0.5 % (ref 0–0.5)
LYMPHOCYTES # BLD AUTO: 0.8 K/UL (ref 1–4.8)
LYMPHOCYTES NFR BLD: 7.7 % (ref 18–48)
MAGNESIUM SERPL-MCNC: 1.8 MG/DL (ref 1.6–2.6)
MCH RBC QN AUTO: 30.7 PG (ref 27–31)
MCHC RBC AUTO-ENTMCNC: 31.9 G/DL (ref 32–36)
MCV RBC AUTO: 97 FL (ref 82–98)
MONOCYTES # BLD AUTO: 0.3 K/UL (ref 0.3–1)
MONOCYTES NFR BLD: 2.8 % (ref 4–15)
NEUTROPHILS # BLD AUTO: 9.4 K/UL (ref 1.8–7.7)
NEUTROPHILS NFR BLD: 88.9 % (ref 38–73)
NRBC BLD-RTO: 0 /100 WBC
PLATELET # BLD AUTO: 184 K/UL (ref 150–450)
PMV BLD AUTO: 12.3 FL (ref 9.2–12.9)
POCT GLUCOSE: 196 MG/DL (ref 70–110)
POCT GLUCOSE: 209 MG/DL (ref 70–110)
POCT GLUCOSE: 236 MG/DL (ref 70–110)
POCT GLUCOSE: 243 MG/DL (ref 70–110)
POCT GLUCOSE: 248 MG/DL (ref 70–110)
POCT GLUCOSE: 359 MG/DL (ref 70–110)
POCT GLUCOSE: 414 MG/DL (ref 70–110)
POTASSIUM SERPL-SCNC: 3.6 MMOL/L (ref 3.5–5.1)
PROT SERPL-MCNC: 4.7 G/DL (ref 6–8.4)
RBC # BLD AUTO: 3.97 M/UL (ref 4–5.4)
SODIUM SERPL-SCNC: 138 MMOL/L (ref 136–145)
WBC # BLD AUTO: 10.52 K/UL (ref 3.9–12.7)

## 2022-07-10 PROCEDURE — 27000221 HC OXYGEN, UP TO 24 HOURS

## 2022-07-10 PROCEDURE — 25000003 PHARM REV CODE 250: Performed by: HOSPITALIST

## 2022-07-10 PROCEDURE — 99900035 HC TECH TIME PER 15 MIN (STAT)

## 2022-07-10 PROCEDURE — 99232 PR SUBSEQUENT HOSPITAL CARE,LEVL II: ICD-10-PCS | Mod: GC,,, | Performed by: INTERNAL MEDICINE

## 2022-07-10 PROCEDURE — 99232 PR SUBSEQUENT HOSPITAL CARE,LEVL II: ICD-10-PCS | Mod: ,,, | Performed by: HOSPITALIST

## 2022-07-10 PROCEDURE — 94761 N-INVAS EAR/PLS OXIMETRY MLT: CPT

## 2022-07-10 PROCEDURE — 20600001 HC STEP DOWN PRIVATE ROOM

## 2022-07-10 PROCEDURE — 94640 AIRWAY INHALATION TREATMENT: CPT

## 2022-07-10 PROCEDURE — 85025 COMPLETE CBC W/AUTO DIFF WBC: CPT | Performed by: STUDENT IN AN ORGANIZED HEALTH CARE EDUCATION/TRAINING PROGRAM

## 2022-07-10 PROCEDURE — 63600175 PHARM REV CODE 636 W HCPCS: Performed by: STUDENT IN AN ORGANIZED HEALTH CARE EDUCATION/TRAINING PROGRAM

## 2022-07-10 PROCEDURE — 25000003 PHARM REV CODE 250: Performed by: STUDENT IN AN ORGANIZED HEALTH CARE EDUCATION/TRAINING PROGRAM

## 2022-07-10 PROCEDURE — 25000242 PHARM REV CODE 250 ALT 637 W/ HCPCS: Performed by: STUDENT IN AN ORGANIZED HEALTH CARE EDUCATION/TRAINING PROGRAM

## 2022-07-10 PROCEDURE — 80053 COMPREHEN METABOLIC PANEL: CPT | Performed by: STUDENT IN AN ORGANIZED HEALTH CARE EDUCATION/TRAINING PROGRAM

## 2022-07-10 PROCEDURE — 94760 N-INVAS EAR/PLS OXIMETRY 1: CPT

## 2022-07-10 PROCEDURE — 63600175 PHARM REV CODE 636 W HCPCS: Performed by: HOSPITALIST

## 2022-07-10 PROCEDURE — 25000003 PHARM REV CODE 250: Performed by: INTERNAL MEDICINE

## 2022-07-10 PROCEDURE — 99232 SBSQ HOSP IP/OBS MODERATE 35: CPT | Mod: ,,, | Performed by: HOSPITALIST

## 2022-07-10 PROCEDURE — 99232 SBSQ HOSP IP/OBS MODERATE 35: CPT | Mod: GC,,, | Performed by: INTERNAL MEDICINE

## 2022-07-10 PROCEDURE — 83735 ASSAY OF MAGNESIUM: CPT | Performed by: STUDENT IN AN ORGANIZED HEALTH CARE EDUCATION/TRAINING PROGRAM

## 2022-07-10 PROCEDURE — 36415 COLL VENOUS BLD VENIPUNCTURE: CPT | Performed by: STUDENT IN AN ORGANIZED HEALTH CARE EDUCATION/TRAINING PROGRAM

## 2022-07-10 PROCEDURE — 99232 SBSQ HOSP IP/OBS MODERATE 35: CPT | Mod: ,,, | Performed by: NURSE PRACTITIONER

## 2022-07-10 PROCEDURE — 99232 PR SUBSEQUENT HOSPITAL CARE,LEVL II: ICD-10-PCS | Mod: ,,, | Performed by: NURSE PRACTITIONER

## 2022-07-10 RX ORDER — PREDNISONE 20 MG/1
40 TABLET ORAL DAILY
Status: DISCONTINUED | OUTPATIENT
Start: 2022-07-10 | End: 2022-07-11 | Stop reason: HOSPADM

## 2022-07-10 RX ADMIN — INSULIN ASPART 2 UNITS: 100 INJECTION, SOLUTION INTRAVENOUS; SUBCUTANEOUS at 08:07

## 2022-07-10 RX ADMIN — SPIRONOLACTONE 25 MG: 25 TABLET, FILM COATED ORAL at 08:07

## 2022-07-10 RX ADMIN — LATANOPROST 1 DROP: 50 SOLUTION OPHTHALMIC at 09:07

## 2022-07-10 RX ADMIN — MUPIROCIN: 20 OINTMENT TOPICAL at 08:07

## 2022-07-10 RX ADMIN — PREDNISONE 40 MG: 20 TABLET ORAL at 01:07

## 2022-07-10 RX ADMIN — PANCRELIPASE 1 CAPSULE: 24000; 76000; 120000 CAPSULE, DELAYED RELEASE PELLETS ORAL at 08:07

## 2022-07-10 RX ADMIN — VALSARTAN 160 MG: 160 TABLET, FILM COATED ORAL at 08:07

## 2022-07-10 RX ADMIN — HEPARIN SODIUM 5000 UNITS: 5000 INJECTION INTRAVENOUS; SUBCUTANEOUS at 06:07

## 2022-07-10 RX ADMIN — INSULIN ASPART 6 UNITS: 100 INJECTION, SOLUTION INTRAVENOUS; SUBCUTANEOUS at 01:07

## 2022-07-10 RX ADMIN — HEPARIN SODIUM 5000 UNITS: 5000 INJECTION INTRAVENOUS; SUBCUTANEOUS at 09:07

## 2022-07-10 RX ADMIN — IPRATROPIUM BROMIDE AND ALBUTEROL SULFATE 3 ML: 2.5; .5 SOLUTION RESPIRATORY (INHALATION) at 08:07

## 2022-07-10 RX ADMIN — ATORVASTATIN CALCIUM 80 MG: 20 TABLET, FILM COATED ORAL at 09:07

## 2022-07-10 RX ADMIN — INSULIN ASPART 4 UNITS: 100 INJECTION, SOLUTION INTRAVENOUS; SUBCUTANEOUS at 05:07

## 2022-07-10 RX ADMIN — FLUTICASONE FUROATE AND VILANTEROL TRIFENATATE 1 PUFF: 200; 25 POWDER RESPIRATORY (INHALATION) at 08:07

## 2022-07-10 RX ADMIN — LEVOFLOXACIN 750 MG: 750 TABLET, FILM COATED ORAL at 08:07

## 2022-07-10 RX ADMIN — PANCRELIPASE 1 CAPSULE: 24000; 76000; 120000 CAPSULE, DELAYED RELEASE PELLETS ORAL at 05:07

## 2022-07-10 RX ADMIN — METHYLPREDNISOLONE SODIUM SUCCINATE 40 MG: 40 INJECTION, POWDER, FOR SOLUTION INTRAMUSCULAR; INTRAVENOUS at 06:07

## 2022-07-10 RX ADMIN — FUROSEMIDE 20 MG: 20 TABLET ORAL at 08:07

## 2022-07-10 RX ADMIN — INSULIN ASPART 6 UNITS: 100 INJECTION, SOLUTION INTRAVENOUS; SUBCUTANEOUS at 08:07

## 2022-07-10 RX ADMIN — IPRATROPIUM BROMIDE AND ALBUTEROL SULFATE 3 ML: 2.5; .5 SOLUTION RESPIRATORY (INHALATION) at 01:07

## 2022-07-10 RX ADMIN — INSULIN ASPART 4 UNITS: 100 INJECTION, SOLUTION INTRAVENOUS; SUBCUTANEOUS at 01:07

## 2022-07-10 RX ADMIN — CLOPIDOGREL 75 MG: 75 TABLET, FILM COATED ORAL at 08:07

## 2022-07-10 RX ADMIN — INSULIN ASPART 7 UNITS: 100 INJECTION, SOLUTION INTRAVENOUS; SUBCUTANEOUS at 05:07

## 2022-07-10 RX ADMIN — HEPARIN SODIUM 5000 UNITS: 5000 INJECTION INTRAVENOUS; SUBCUTANEOUS at 01:07

## 2022-07-10 RX ADMIN — SENNOSIDES AND DOCUSATE SODIUM 1 TABLET: 50; 8.6 TABLET ORAL at 09:07

## 2022-07-10 RX ADMIN — CARVEDILOL 12.5 MG: 12.5 TABLET, FILM COATED ORAL at 05:07

## 2022-07-10 RX ADMIN — INSULIN ASPART 1 UNITS: 100 INJECTION, SOLUTION INTRAVENOUS; SUBCUTANEOUS at 10:07

## 2022-07-10 RX ADMIN — PANCRELIPASE 1 CAPSULE: 24000; 76000; 120000 CAPSULE, DELAYED RELEASE PELLETS ORAL at 01:07

## 2022-07-10 RX ADMIN — CARVEDILOL 12.5 MG: 12.5 TABLET, FILM COATED ORAL at 08:07

## 2022-07-10 NOTE — SUBJECTIVE & OBJECTIVE
"Interval HPI:   Overnight events:   BG has trended upwards above goal while on current IV/SQ transition insulin protocol. Patient endorsed eating high carb, high sugar breakfast that was provided by dietary team. This is most likely reason for current hyperglycemia.   Diet diabetic [facility]; 1500 Calorie; Consistent Carbohydrate       Eatin%  Nausea: No  Hypoglycemia and intervention: No  Fever: No  TPN and/or TF: No  If yes, type of TF/TPN and rate: None    /63 (BP Location: Left arm, Patient Position: Lying)   Pulse 69   Temp 98.2 °F (36.8 °C) (Oral)   Resp 17   Ht 5' 5" (1.651 m)   Wt 44.5 kg (98 lb)   SpO2 (!) 92%   BMI 16.31 kg/m²     Labs Reviewed and Include    Recent Labs   Lab 07/10/22  0340   *   CALCIUM 8.2*   ALBUMIN 2.5*   PROT 4.7*      K 3.6   CO2 26      BUN 27*   CREATININE 0.7   ALKPHOS 51*   ALT 58*   AST 63*   BILITOT 0.4     Lab Results   Component Value Date    WBC 10.52 07/10/2022    HGB 12.2 07/10/2022    HCT 38.3 07/10/2022    MCV 97 07/10/2022     07/10/2022     Recent Labs   Lab 22  1316   TSH 2.897     Lab Results   Component Value Date    HGBA1C 8.2 (H) 2022       Nutritional status:   Body mass index is 16.31 kg/m².  Lab Results   Component Value Date    ALBUMIN 2.5 (L) 07/10/2022    ALBUMIN 2.6 (L) 2022    ALBUMIN 2.6 (L) 2022     No results found for: PREALBUMIN    Estimated Creatinine Clearance: 41.3 mL/min (based on SCr of 0.7 mg/dL).    Accu-Checks  Recent Labs     22  0032 22  0703 22  1144 22  1624 22  2148 07/10/22  0212 07/10/22  0612 07/10/22  0738 07/10/22  1121   POCTGLUCOSE 133* 180* 166* 177* 228* 147* 196* 236* 243* 414*       Current Medications and/or Treatments Impacting Glycemic Control  Immunotherapy:    Immunosuppressants       None          Steroids:   Hormones (From admission, onward)                Start     Stop Route Frequency Ordered    " 07/10/22 1245  predniSONE tablet 40 mg         -- Oral Daily 07/10/22 1134    07/05/22 1448  melatonin tablet 6 mg         -- Oral Nightly PRN 07/05/22 1349          Pressors:    Autonomic Drugs (From admission, onward)                Start     Stop Route Frequency Ordered    07/05/22 1717  EPINEPHrine injection 0.3 mg         -- IM As needed (PRN) 07/05/22 1618          Hyperglycemia/Diabetes Medications:   Antihyperglycemics (From admission, onward)                Start     Stop Route Frequency Ordered    07/09/22 1530  insulin regular in 0.9 % NaCl 100 unit/100 mL (1 unit/mL) infusion        Question:  Enter initial dose (Units/hr):  Answer:  0.6    -- IV Continuous 07/09/22 1520    07/07/22 1130  insulin aspart U-100 pen 6 Units         -- SubQ with lunch 07/06/22 1911 07/07/22 0715  insulin aspart U-100 pen 6 Units         -- SubQ with breakfast 07/06/22 1911 07/06/22 2011  insulin aspart U-100 pen 0-4 Units         -- SubQ As needed (PRN) 07/06/22 1911 07/06/22 1915  insulin aspart U-100 pen 7 Units         -- SubQ with dinner 07/06/22 1914

## 2022-07-10 NOTE — ASSESSMENT & PLAN NOTE
- bronchodilators, inhaled corticosteroids  - systemic steroids  - supplemental O2  - CXR demonstrates bibasiler opacities   - on abx therapy  - consult pulm secondary to not improving  - remains on supplemental O2

## 2022-07-10 NOTE — ASSESSMENT & PLAN NOTE
- secondary to asthma exacerbation  - supplemental O2   - treat asthma exacerbation  - improving

## 2022-07-10 NOTE — PLAN OF CARE
Problem: Adult Inpatient Plan of Care  Goal: Plan of Care Review  Outcome: Ongoing, Progressing  Goal: Patient-Specific Goal (Individualized)  Outcome: Ongoing, Progressing  Goal: Absence of Hospital-Acquired Illness or Injury  Outcome: Ongoing, Progressing  Goal: Optimal Comfort and Wellbeing  Outcome: Ongoing, Progressing  Goal: Readiness for Transition of Care  Outcome: Ongoing, Progressing     Problem: Adjustment to Illness (Sepsis/Septic Shock)  Goal: Optimal Coping  Outcome: Ongoing, Progressing     Problem: Bleeding (Sepsis/Septic Shock)  Goal: Absence of Bleeding  Outcome: Ongoing, Progressing     Problem: Infection Progression (Sepsis/Septic Shock)  Goal: Absence of Infection Signs and Symptoms  Outcome: Ongoing, Progressing     Problem: Nutrition Impaired (Sepsis/Septic Shock)  Goal: Optimal Nutrition Intake  Outcome: Ongoing, Progressing     Problem: Fluid Imbalance (Pneumonia)  Goal: Fluid Balance  Outcome: Ongoing, Progressing     Problem: Infection (Pneumonia)  Goal: Resolution of Infection Signs and Symptoms  Outcome: Ongoing, Progressing     Problem: Respiratory Compromise (Pneumonia)  Goal: Effective Oxygenation and Ventilation  Outcome: Ongoing, Progressing     Problem: Fall Injury Risk  Goal: Absence of Fall and Fall-Related Injury  Outcome: Ongoing, Progressing     Problem: Infection  Goal: Absence of Infection Signs and Symptoms  Outcome: Ongoing, Progressing     Problem: Skin Injury Risk Increased  Goal: Skin Health and Integrity  Outcome: Ongoing, Progressing     Problem: Diabetes Comorbidity  Goal: Blood Glucose Level Within Targeted Range  Outcome: Ongoing, Not Progressing     Problem: Glycemic Control Impaired (Sepsis/Septic Shock)  Goal: Blood Glucose Level Within Desired Range  Outcome: Ongoing, Not Progressing

## 2022-07-10 NOTE — PROGRESS NOTES
[facility] - Telemetry [facility] Medicine  Progress Note    Patient Name: Sussy Costa  MRN: [id number]  Patient Class: IP- Inpatient   Admission Date: 7/5/2022  Length of Stay: 5 days  Attending Physician: Jenny Mckeon MD  Primary Care Provider: EZ Casillas MD        Subjective:     Principal Problem:Exacerbation of asthma        HPI:  This is an 87yo female with a h/o HTN, DIABETES MELITIS, asthma who presented to [facility] secondary to sudden onset dyspnea and chest pain on day of admission.  ECG demonstrated ST elevations anteriour leads.  Cardiology admitted patient and she was given asa, Plavix, and heparin.  Repeat ECG looked improved and the patient's chest pain resolved.  She was then transferred to hospitalist service for asthma.         Overview/Hospital Course:  Echocardiogram demonstrated normal LVEF with no WMA.  Troponin normal.  Patient placed on solu medrol, bronchodilators, inhaled corticosteroids.  Remains on supplemental O2.  Repeat CXR demonstrates bibasiler opacities.  Previous specimen from bronchoscopy May 2022 grew pan-sensitive pseudomonas.    Now covering with levofloxacin. Pulmonary consulted secondary to patient's lack of improvement and continued bronchospasms and need for supplemental O2.        Interval History:   No acute events overnight.  Patient states that she feels better overall.  However, continues to be O2 dependant at this time.     Review of Systems   Constitutional:  Positive for activity change. Negative for appetite change and fatigue.   Respiratory:  Positive for cough, shortness of breath and wheezing. Negative for chest tightness.    Cardiovascular:  Negative for chest pain and leg swelling.   Gastrointestinal:  Negative for abdominal pain, nausea and vomiting.   Genitourinary:  Negative for difficulty urinating.   Neurological:  Positive for weakness.   Objective:     Vital Signs (Most Recent):  Temp: 98.2 °F (36.8 °C) (07/10/22 1120)  Pulse: 69 (07/10/22  1120)  Resp: 17 (07/10/22 1120)  BP: 127/63 (07/10/22 1120)  SpO2: (!) 92 % (07/10/22 1120)   Vital Signs (24h Range):  Temp:  [97.6 °F (36.4 °C)-98.2 °F (36.8 °C)] 98.2 °F (36.8 °C)  Pulse:  [59-78] 69  Resp:  [14-18] 17  SpO2:  [92 %-98 %] 92 %  BP: (126-163)/(60-74) 127/63     Weight: 44.5 kg (98 lb)  Body mass index is 16.31 kg/m².    Intake/Output Summary (Last 24 hours) at 7/10/2022 1124  Last data filed at 7/10/2022 0700  Gross per 24 hour   Intake --   Output 1250 ml   Net -1250 ml      Physical Exam  Constitutional:       General: She is not in acute distress.     Appearance: Normal appearance. She is normal weight. She is not ill-appearing.   HENT:      Head: Normocephalic and atraumatic.      Nose: Nose normal. No congestion.      Mouth/Throat:      Mouth: Mucous membranes are moist.      Pharynx: Oropharynx is clear.   Eyes:      Conjunctiva/sclera: Conjunctivae normal.      Pupils: Pupils are equal, round, and reactive to light.   Cardiovascular:      Rate and Rhythm: Normal rate and regular rhythm.      Heart sounds: No murmur heard.  Pulmonary:      Effort: Pulmonary effort is normal. No respiratory distress.      Breath sounds: Wheezing present.   Abdominal:      General: Abdomen is flat. Bowel sounds are normal.      Palpations: Abdomen is soft.   Musculoskeletal:         General: No swelling or deformity. Normal range of motion.   Skin:     General: Skin is warm and dry.      Coloration: Skin is not jaundiced.      Findings: No erythema.   Neurological:      General: No focal deficit present.      Mental Status: She is alert and oriented to person, place, and time. Mental status is at baseline.   Psychiatric:         Mood and Affect: Mood normal.         Behavior: Behavior normal.       Significant Labs: All pertinent labs within the past 24 hours have been reviewed.  BMP:   Recent Labs   Lab 07/10/22  0340   *      K 3.6      CO2 26   BUN 27*   CREATININE 0.7   CALCIUM 8.2*   MG  1.8     CBC:   Recent Labs   Lab 07/09/22  0712 07/10/22  0340   WBC 12.49 10.52   HGB 12.3 12.2   HCT 39.1 38.3    184       Significant Imaging: I have reviewed all pertinent imaging results/findings within the past 24 hours.      Assessment/Plan:      * Exacerbation of asthma  - bronchodilators, inhaled corticosteroids  - systemic steroids  - supplemental O2  - CXR demonstrates bibasiler opacities   - on abx therapy  - consult pulm secondary to not improving  - remains on supplemental O2    Dyspnea  - secondary to asthma exacerbation  - supplemental O2   - treat asthma exacerbation  - improving       Pneumonia of left lower lobe due to Pseudomonas species  - patient had bronchoscopy for left lower lobe atelectasis in May 2022.  Copious amounts of respiratory secretions were seen and collected.  Cultures grew pseudomonas.  Not treated at that time.  Now treating with levofloxacin.       Chest tightness  Initial concern for stemi but cardiac enzymes normal  - has since resolved      Hypertensive urgency  - losartan, coreg  - monitor and adjust medication regimen as needed      Type 2 diabetes mellitus with hyperglycemia, with long-term current use of insulin  Continue home medication regimen and make adjustments as needed; especially since patient on IV steroids.   - premeal rapid acting insulin  - diabetic diet  - endocrine consulted        VTE Risk Mitigation (From admission, onward)         Ordered     heparin (porcine) injection 5,000 Units  Every 8 hours         07/06/22 1142     IP VTE HIGH RISK PATIENT  Once         07/05/22 1349     Place sequential compression device  Until discontinued         07/05/22 1349                Discharge Planning   ANTOINE: 7/10/2022     Code Status: Full Code   Is the patient medically ready for discharge?:     Reason for patient still in hospital (select all that apply): Treatment  Discharge Plan A: Home with family                  Jenny Mckeon MD  Department of  [facility] Medicine   [facility] - Telemetry Stepdown

## 2022-07-10 NOTE — PLAN OF CARE
Problem: Adult Inpatient Plan of Care  Goal: Absence of Hospital-Acquired Illness or Injury  Intervention: Identify and Manage Fall Risk  Flowsheets (Taken 7/10/2022 0224)  Safety Promotion/Fall Prevention:   assistive device/personal item within reach   medications reviewed   high risk medications identified   side rails raised x 2  Intervention: Prevent Skin Injury  Flowsheets (Taken 7/10/2022 0224)  Body Position: weight shifting  Intervention: Prevent and Manage VTE (Venous Thromboembolism) Risk  Flowsheets (Taken 7/10/2022 0224)  Activity Management: Arm raise - L1  VTE Prevention/Management:   bleeding precations maintained   bleeding risk assessed  Range of Motion: active ROM (range of motion) encouraged  Intervention: Prevent Infection  Flowsheets (Taken 7/10/2022 0224)  Infection Prevention:   environmental surveillance performed   hand hygiene promoted   equipment surfaces disinfected   personal protective equipment utilized   rest/sleep promoted  Goal: Optimal Comfort and Wellbeing  Intervention: Monitor Pain and Promote Comfort  Flowsheets (Taken 7/10/2022 0224)  Pain Management Interventions: quiet environment facilitated  Intervention: Provide Person-Centered Care  Flowsheets (Taken 7/10/2022 0224)  Trust Relationship/Rapport:   care explained   emotional support provided   empathic listening provided   questions answered   questions encouraged   reassurance provided   thoughts/feelings acknowledged   Pt in bed, resp even and unlabored.  No s/s of distress noted at this time.  Safety measures in progress, call light within reach. BC being monitored Q4H.

## 2022-07-10 NOTE — ASSESSMENT & PLAN NOTE
- patient had bronchoscopy for left lower lobe atelectasis in May 2022.  Copious amounts of respiratory secretions were seen and collected.  Cultures grew pseudomonas.  Not treated at that time.  Now treating with levofloxacin.

## 2022-07-10 NOTE — PROGRESS NOTES
[facility] Medicine  Initial Consult Note      Primary Attending:  Jenny Mckeon MD   Consultant Attending: Dr. Marquez   Consultant Fellow: Gertrude Willis MD       Chief Complaint/Reason for Consult      Asthma exacerbation     History of Present Illness      86yoF h/o HTN, DM, asthma who presented for cp and sob. Cardiology initially admitted the patient due to concerning EKG with trop elevation. Troponin peaked and EKG normalized. The patient was transferred to hospital medicine for management of asthma. The patient had increased O2 requirements of 2-3L here.     Patient states her breathing is feeling improved today. 98% on 2L NC.      Past Medical History      Medical:  has a past medical history of Asthma, Cyst of pancreas, Diabetes mellitus type II, Eczema of hand, Glaucoma, Hyperlipidemia, Hypertension, Lichen planus, Osteoporosis, and Pulmonary nodules.    Surgical:  has a past surgical history that includes Hysterectomy; Cholecystectomy; nasal polyps; Cataract extraction, bilateral; Epidural steroid injection into lumbar spine (N/A, 11/8/2018); Functional endoscopic sinus surgery (FESS) using computer-assisted navigation (N/A, 6/17/2019); Endoscopic ultrasound of upper gastrointestinal tract (N/A, 4/22/2022); and Bronchoscopy (N/A, 5/13/2022).    Family: family history includes Heart disease in her brother and father; Hypertension in her brother.    Social:  reports that she has never smoked. She has never used smokeless tobacco. She reports current alcohol use. She reports that she does not use drugs.    Allergies and Medications reviewed     Review of Systems      · Other than those symptoms mentioned above, an extensive review of systems was unremarkable.  · A complete review of systems was not obtainable due to the current medical condition of the patient.       On Examination     Vital Signs   Temp:  [97.6 °F (36.4 °C)-98.2 °F (36.8 °C)]   Pulse:  [59-78]   Resp:  [14-18]    BP: (126-163)/(60-74)   SpO2:  [92 %-98 %]    Physical Exam   GENERAL: very thin female, sitting in chair watching TV.    HEENT: Pupils are equally round and briskly reactive to light. Extraocular muscles are grossly intact. Oral mucous membranes are moist without lesions.    NECK: The patient has no noted JVD. No adenopathy is appreciated.    CHEST/LUNGS:her lung with adventitious sounds on inspiration and wheezing on expiration diffusely. Good air movement. Symmetric.     HEART: The patient has a regular rate and rhythm. No murmurs, rubs, or gallops are appreciated. Distal pulses are 2+. .    ABDOMEN: The patients abdomen is completely soft, nontender, and nondistended. Bowel sounds are present. No organomegaly is appreciated. No masses are appreciated. There are no peritoneal signs.    EXTREMITIES: The patient has no peripheral edema. There is no focal long bone tenderness or deformity.    SKIN: The patients skin is warm and dry, without rashes or lesions.    PSYCHIATRIC: The patient has normal mental status and has an appropriate affect.    NEUROLOGIC: The patient has equal strength in both upper and lower extremities without focal deficit. There are no gross deficits to the cranial nerves.       All recent labs and imaging studies have been reviewed    Pertinent findings include:    PFT's FVC (5/11/2022): 1.35 L 54% And Fev-1: 0.73 L 54%      I have personally and independently interpretted the following tests:  CXR: hyperinflated, LLL opacification   Bronch 5/13/2022 done for persistent cough. CT noted posterior basilar segment of left lung is airless. The pt complaining of persistent cough.     Findings: Left lower lobe mass? with atelectasis of the LLL. Culture grew pan-sensitive pseudomonas and moraxella     Assessment and Plan / Recommendations     Sussy Costa    · Asthma exacerbation with severe baseline obstructive lung disease.   · Continue bronchodilators and inhaled corticosteroids    · Recommend CPT, flutter valve to improve airway clearance.  · Prednisone 40mg po daily with 2 week wean on discharge  · Wean O2 as able, send with home O2   · Continue levoquin for CAP and pseudomonal coverage per prior culture.   · Pulmonary follow up as outpatient.   · Consider possible eosinophilic component to asthma. Possible IgE.   · Follow up nodules on CT, consider evaluating for aspergillus  · HTN  · Management per primary team  · Diabetes    Management per primary team    This plan was discussed with staff pulmonologist Dr. Marquez    Thank you for the opportunity to be involved in Ms. Costa's care.    Please call or message directly through EPIC with any additional questions or concerns.    Gertrude Willis MD  Emergency Medicine Staff   Critical Care Fellow  5:40 PM      07/10/2022  5:40 PM

## 2022-07-10 NOTE — ASSESSMENT & PLAN NOTE
BG goal 140 - 180     - Increase transition IV insulin infusion with stepdown parameters. Initial rate starting at 0.9 unit/hr. BG > 400 mg/dl. Patient ate regular diet for breakfast.   - Continue Novolog 6 units with breakfast and lunch, and 7 units with dinner.   - Low Dose SQ Insulin Correction Scale PRN subsequent hyperglycemia.   - BG Monitoring AC/HS.     ** Please call Endocrine for any BG related issues **  ** Please notify Endocrine for any change and/or advance in diet**    Lab Results   Component Value Date    HGBA1C 8.2 (H) 07/05/2022       Discharge Planning:   - PRN refill of Insurance preferred diabetes testing supplies  - Continue Levemir 3 units BID.   - Continue Novolog 6 units with breakfast and lunch, and 7 units with dinner.   - Low Dose SQ Insulin Correction Scale:     150 - 200 + 1 unit     201 - 250 + 2 units     251 - 300 + 3 units     301 - 350 + 4 units      > 350   + 5 units  - Patient is to follow-up with Dr. Martines with [facility] outpatient endocrinology clinic for continued DM management and care.

## 2022-07-10 NOTE — PROGRESS NOTES
[facility] - Telemetry Stepdown  Endocrinology  Progress Note    Admit Date: 2022     Reason for Consult: Management of T2DM, Hyperglycemia     Surgical Procedure and Date: N/A    Diabetes diagnosis year: about 25 years ago    Home Diabetes Medications:  Novolog 6 units with breakfast and lunch, 7 units with dinner, Levemir 3 units BID     How often checking glucose at home?  4x daily    BG readings on regimen: mostly above 200  Hypoglycemia on the regimen?  No  Missed doses on regimen?  No    Diabetes Complications include:     Hyperglycemia    Complicating diabetes co morbidities:   HTN, HLD      HPI:   Patient is a 86 y.o. female with a diagnosis of hypertension, hyperlipidemia, diabetes mellitus, and asthma who presents to hospital secondary to dyspnea associated chest tightness. In the ED, patient was mildly hypertensive with systolic pressure 150s initially increased to 180s.  EKG on admission showing STEMI with sinus rhythm with ST elevations and T-wave inversions anteriorly however repeat EKG similar to baseline (2021) showing sinus rhythm with incomplete right bundle branch block and T-wave inversions anteriorly..  Patient is asymptomatic currently denies any dyspnea or chest tightness/pain, or pressure.  Admitted to SICU. Patient last seen by Dr. Martines at [facility] endocrine clinic for DM management on 22.    Lab Results   Component Value Date    HGBA1C 8.2 (H) 2022              Interval HPI:   Overnight events:   BG has trended upwards above goal while on current IV/SQ transition insulin protocol. Patient endorsed eating high carb, high sugar breakfast that was provided by dietary team. This is most likely reason for current hyperglycemia.   Diet diabetic [facility]; 1500 Calorie; Consistent Carbohydrate       Eatin%  Nausea: No  Hypoglycemia and intervention: No  Fever: No  TPN and/or TF: No  If yes, type of TF/TPN and rate: None    /63 (BP Location: Left arm, Patient  "Position: Lying)   Pulse 69   Temp 98.2 °F (36.8 °C) (Oral)   Resp 17   Ht 5' 5" (1.651 m)   Wt 44.5 kg (98 lb)   SpO2 (!) 92%   BMI 16.31 kg/m²     Labs Reviewed and Include    Recent Labs   Lab 07/10/22  0340   *   CALCIUM 8.2*   ALBUMIN 2.5*   PROT 4.7*      K 3.6   CO2 26      BUN 27*   CREATININE 0.7   ALKPHOS 51*   ALT 58*   AST 63*   BILITOT 0.4     Lab Results   Component Value Date    WBC 10.52 07/10/2022    HGB 12.2 07/10/2022    HCT 38.3 07/10/2022    MCV 97 07/10/2022     07/10/2022     Recent Labs   Lab 07/05/22  1316   TSH 2.897     Lab Results   Component Value Date    HGBA1C 8.2 (H) 07/05/2022       Nutritional status:   Body mass index is 16.31 kg/m².  Lab Results   Component Value Date    ALBUMIN 2.5 (L) 07/10/2022    ALBUMIN 2.6 (L) 07/09/2022    ALBUMIN 2.6 (L) 07/08/2022     No results found for: PREALBUMIN    Estimated Creatinine Clearance: 41.3 mL/min (based on SCr of 0.7 mg/dL).    Accu-Checks  Recent Labs     07/08/22 2023 07/09/22  0032 07/09/22  0703 07/09/22  1144 07/09/22  1624 07/09/22  2148 07/10/22  0212 07/10/22  0612 07/10/22  0738 07/10/22  1121   POCTGLUCOSE 133* 180* 166* 177* 228* 147* 196* 236* 243* 414*       Current Medications and/or Treatments Impacting Glycemic Control  Immunotherapy:    Immunosuppressants       None          Steroids:   Hormones (From admission, onward)                Start     Stop Route Frequency Ordered    07/10/22 1245  predniSONE tablet 40 mg         -- Oral Daily 07/10/22 1134    07/05/22 1448  melatonin tablet 6 mg         -- Oral Nightly PRN 07/05/22 1349          Pressors:    Autonomic Drugs (From admission, onward)                Start     Stop Route Frequency Ordered    07/05/22 1717  EPINEPHrine injection 0.3 mg         -- IM As needed (PRN) 07/05/22 1618          Hyperglycemia/Diabetes Medications:   Antihyperglycemics (From admission, onward)                Start     Stop Route Frequency Ordered    07/09/22 " 1530  insulin regular in 0.9 % NaCl 100 unit/100 mL (1 unit/mL) infusion        Question:  Enter initial dose (Units/hr):  Answer:  0.6    -- IV Continuous 07/09/22 1520    07/07/22 1130  insulin aspart U-100 pen 6 Units         -- SubQ with lunch 07/06/22 1911 07/07/22 0715  insulin aspart U-100 pen 6 Units         -- SubQ with breakfast 07/06/22 1911 07/06/22 2011  insulin aspart U-100 pen 0-4 Units         -- SubQ As needed (PRN) 07/06/22 1911 07/06/22 1915  insulin aspart U-100 pen 7 Units         -- SubQ with dinner 07/06/22 1914            ASSESSMENT and PLAN    * Exacerbation of asthma  Managed per primary team  Avoid hypoglycemia        Type 2 diabetes mellitus with hyperglycemia, with long-term current use of insulin  BG goal 140 - 180     - Increase transition IV insulin infusion with stepdown parameters. Initial rate starting at 0.9 unit/hr. BG > 400 mg/dl. Patient ate regular diet for breakfast.   - Continue Novolog 6 units with breakfast and lunch, and 7 units with dinner.   - Low Dose SQ Insulin Correction Scale PRN subsequent hyperglycemia.   - BG Monitoring AC/HS.     ** Please call Endocrine for any BG related issues **  ** Please notify Endocrine for any change and/or advance in diet**    Lab Results   Component Value Date    HGBA1C 8.2 (H) 07/05/2022       Discharge Planning:   - PRN refill of Insurance preferred diabetes testing supplies  - Continue Levemir 3 units BID.   - Continue Novolog 6 units with breakfast and lunch, and 7 units with dinner.   - Low Dose SQ Insulin Correction Scale:     150 - 200 + 1 unit     201 - 250 + 2 units     251 - 300 + 3 units     301 - 350 + 4 units      > 350   + 5 units  - Patient is to follow-up with Dr. Martines with [facility] outpatient endocrinology clinic for continued DM management and care.               Hyperlipidemia  May increase insulin resistance.         HTN (hypertension)  Managed per primary team  Condition may cause insulin resistance          Adverse effect of corticosteroids  Glucocorticoids markedly increase glucoses. Expect increase insulin requirements while receiving steroids.             Clement Quick, NP  Endocrinology  [facility] - Telemetry Stepdown

## 2022-07-10 NOTE — SUBJECTIVE & OBJECTIVE
Interval History:   No acute events overnight.  Patient states that she feels better overall.  However, continues to be O2 dependant at this time.     Review of Systems   Constitutional:  Positive for activity change. Negative for appetite change and fatigue.   Respiratory:  Positive for cough, shortness of breath and wheezing. Negative for chest tightness.    Cardiovascular:  Negative for chest pain and leg swelling.   Gastrointestinal:  Negative for abdominal pain, nausea and vomiting.   Genitourinary:  Negative for difficulty urinating.   Neurological:  Positive for weakness.   Objective:     Vital Signs (Most Recent):  Temp: 98.2 °F (36.8 °C) (07/10/22 1120)  Pulse: 69 (07/10/22 1120)  Resp: 17 (07/10/22 1120)  BP: 127/63 (07/10/22 1120)  SpO2: (!) 92 % (07/10/22 1120)   Vital Signs (24h Range):  Temp:  [97.6 °F (36.4 °C)-98.2 °F (36.8 °C)] 98.2 °F (36.8 °C)  Pulse:  [59-78] 69  Resp:  [14-18] 17  SpO2:  [92 %-98 %] 92 %  BP: (126-163)/(60-74) 127/63     Weight: 44.5 kg (98 lb)  Body mass index is 16.31 kg/m².    Intake/Output Summary (Last 24 hours) at 7/10/2022 1124  Last data filed at 7/10/2022 0700  Gross per 24 hour   Intake --   Output 1250 ml   Net -1250 ml      Physical Exam  Constitutional:       General: She is not in acute distress.     Appearance: Normal appearance. She is normal weight. She is not ill-appearing.   HENT:      Head: Normocephalic and atraumatic.      Nose: Nose normal. No congestion.      Mouth/Throat:      Mouth: Mucous membranes are moist.      Pharynx: Oropharynx is clear.   Eyes:      Conjunctiva/sclera: Conjunctivae normal.      Pupils: Pupils are equal, round, and reactive to light.   Cardiovascular:      Rate and Rhythm: Normal rate and regular rhythm.      Heart sounds: No murmur heard.  Pulmonary:      Effort: Pulmonary effort is normal. No respiratory distress.      Breath sounds: Wheezing present.   Abdominal:      General: Abdomen is flat. Bowel sounds are normal.       Palpations: Abdomen is soft.   Musculoskeletal:         General: No swelling or deformity. Normal range of motion.   Skin:     General: Skin is warm and dry.      Coloration: Skin is not jaundiced.      Findings: No erythema.   Neurological:      General: No focal deficit present.      Mental Status: She is alert and oriented to person, place, and time. Mental status is at baseline.   Psychiatric:         Mood and Affect: Mood normal.         Behavior: Behavior normal.       Significant Labs: All pertinent labs within the past 24 hours have been reviewed.  BMP:   Recent Labs   Lab 07/10/22  0340   *      K 3.6      CO2 26   BUN 27*   CREATININE 0.7   CALCIUM 8.2*   MG 1.8     CBC:   Recent Labs   Lab 07/09/22  0712 07/10/22  0340   WBC 12.49 10.52   HGB 12.3 12.2   HCT 39.1 38.3    184       Significant Imaging: I have reviewed all pertinent imaging results/findings within the past 24 hours.

## 2022-07-11 ENCOUNTER — TELEPHONE (OUTPATIENT)
Dept: ENDOCRINOLOGY | Facility: HOSPITAL | Age: 87
End: 2022-07-11
Payer: MEDICARE

## 2022-07-11 ENCOUNTER — PATIENT MESSAGE (OUTPATIENT)
Dept: ENDOCRINOLOGY | Facility: CLINIC | Age: 87
End: 2022-07-11
Payer: MEDICARE

## 2022-07-11 VITALS
HEIGHT: 65 IN | RESPIRATION RATE: 16 BRPM | TEMPERATURE: 98 F | OXYGEN SATURATION: 94 % | HEART RATE: 69 BPM | WEIGHT: 98 LBS | SYSTOLIC BLOOD PRESSURE: 120 MMHG | DIASTOLIC BLOOD PRESSURE: 59 MMHG | BODY MASS INDEX: 16.33 KG/M2

## 2022-07-11 LAB
ALBUMIN SERPL BCP-MCNC: 2.5 G/DL (ref 3.5–5.2)
ALP SERPL-CCNC: 51 U/L (ref 55–135)
ALT SERPL W/O P-5'-P-CCNC: 76 U/L (ref 10–44)
ANION GAP SERPL CALC-SCNC: 9 MMOL/L (ref 8–16)
AST SERPL-CCNC: 67 U/L (ref 10–40)
BASOPHILS # BLD AUTO: 0.01 K/UL (ref 0–0.2)
BASOPHILS NFR BLD: 0.1 % (ref 0–1.9)
BILIRUB SERPL-MCNC: 0.5 MG/DL (ref 0.1–1)
BUN SERPL-MCNC: 23 MG/DL (ref 8–23)
CALCIUM SERPL-MCNC: 8.3 MG/DL (ref 8.7–10.5)
CHLORIDE SERPL-SCNC: 102 MMOL/L (ref 95–110)
CO2 SERPL-SCNC: 30 MMOL/L (ref 23–29)
CREAT SERPL-MCNC: 0.7 MG/DL (ref 0.5–1.4)
DIFFERENTIAL METHOD: ABNORMAL
EOSINOPHIL # BLD AUTO: 0 K/UL (ref 0–0.5)
EOSINOPHIL NFR BLD: 0 % (ref 0–8)
ERYTHROCYTE [DISTWIDTH] IN BLOOD BY AUTOMATED COUNT: 14.5 % (ref 11.5–14.5)
EST. GFR  (AFRICAN AMERICAN): >60 ML/MIN/1.73 M^2
EST. GFR  (NON AFRICAN AMERICAN): >60 ML/MIN/1.73 M^2
GLUCOSE SERPL-MCNC: 122 MG/DL (ref 70–110)
HCT VFR BLD AUTO: 36.3 % (ref 37–48.5)
HGB BLD-MCNC: 11.7 G/DL (ref 12–16)
IMM GRANULOCYTES # BLD AUTO: 0.06 K/UL (ref 0–0.04)
IMM GRANULOCYTES NFR BLD AUTO: 0.5 % (ref 0–0.5)
LYMPHOCYTES # BLD AUTO: 1 K/UL (ref 1–4.8)
LYMPHOCYTES NFR BLD: 8.4 % (ref 18–48)
MAGNESIUM SERPL-MCNC: 1.7 MG/DL (ref 1.6–2.6)
MCH RBC QN AUTO: 30.1 PG (ref 27–31)
MCHC RBC AUTO-ENTMCNC: 32.2 G/DL (ref 32–36)
MCV RBC AUTO: 93 FL (ref 82–98)
MONOCYTES # BLD AUTO: 0.7 K/UL (ref 0.3–1)
MONOCYTES NFR BLD: 5.8 % (ref 4–15)
NEUTROPHILS # BLD AUTO: 10.4 K/UL (ref 1.8–7.7)
NEUTROPHILS NFR BLD: 85.2 % (ref 38–73)
NRBC BLD-RTO: 0 /100 WBC
PLATELET # BLD AUTO: 228 K/UL (ref 150–450)
PMV BLD AUTO: 12.3 FL (ref 9.2–12.9)
POCT GLUCOSE: 126 MG/DL (ref 70–110)
POCT GLUCOSE: 149 MG/DL (ref 70–110)
POCT GLUCOSE: 169 MG/DL (ref 70–110)
POCT GLUCOSE: 248 MG/DL (ref 70–110)
POCT GLUCOSE: 317 MG/DL (ref 70–110)
POTASSIUM SERPL-SCNC: 3.8 MMOL/L (ref 3.5–5.1)
PROT SERPL-MCNC: 5 G/DL (ref 6–8.4)
RBC # BLD AUTO: 3.89 M/UL (ref 4–5.4)
SODIUM SERPL-SCNC: 141 MMOL/L (ref 136–145)
WBC # BLD AUTO: 12.17 K/UL (ref 3.9–12.7)

## 2022-07-11 PROCEDURE — 25000003 PHARM REV CODE 250: Performed by: NURSE PRACTITIONER

## 2022-07-11 PROCEDURE — 94640 AIRWAY INHALATION TREATMENT: CPT

## 2022-07-11 PROCEDURE — 63600175 PHARM REV CODE 636 W HCPCS: Performed by: NURSE PRACTITIONER

## 2022-07-11 PROCEDURE — 80053 COMPREHEN METABOLIC PANEL: CPT | Performed by: STUDENT IN AN ORGANIZED HEALTH CARE EDUCATION/TRAINING PROGRAM

## 2022-07-11 PROCEDURE — 94667 MNPJ CHEST WALL 1ST: CPT

## 2022-07-11 PROCEDURE — 99238 HOSP IP/OBS DSCHRG MGMT 30/<: CPT | Mod: ,,, | Performed by: HOSPITALIST

## 2022-07-11 PROCEDURE — 85025 COMPLETE CBC W/AUTO DIFF WBC: CPT | Performed by: STUDENT IN AN ORGANIZED HEALTH CARE EDUCATION/TRAINING PROGRAM

## 2022-07-11 PROCEDURE — 25000003 PHARM REV CODE 250: Performed by: HOSPITALIST

## 2022-07-11 PROCEDURE — 25000242 PHARM REV CODE 250 ALT 637 W/ HCPCS: Performed by: STUDENT IN AN ORGANIZED HEALTH CARE EDUCATION/TRAINING PROGRAM

## 2022-07-11 PROCEDURE — 27000221 HC OXYGEN, UP TO 24 HOURS

## 2022-07-11 PROCEDURE — C9399 UNCLASSIFIED DRUGS OR BIOLOG: HCPCS | Performed by: NURSE PRACTITIONER

## 2022-07-11 PROCEDURE — 27000646 HC AEROBIKA DEVICE

## 2022-07-11 PROCEDURE — 94668 MNPJ CHEST WALL SBSQ: CPT

## 2022-07-11 PROCEDURE — 94761 N-INVAS EAR/PLS OXIMETRY MLT: CPT

## 2022-07-11 PROCEDURE — 63600175 PHARM REV CODE 636 W HCPCS: Performed by: STUDENT IN AN ORGANIZED HEALTH CARE EDUCATION/TRAINING PROGRAM

## 2022-07-11 PROCEDURE — 99238 PR HOSPITAL DISCHARGE DAY,<30 MIN: ICD-10-PCS | Mod: ,,, | Performed by: HOSPITALIST

## 2022-07-11 PROCEDURE — 99232 SBSQ HOSP IP/OBS MODERATE 35: CPT | Mod: ,,, | Performed by: NURSE PRACTITIONER

## 2022-07-11 PROCEDURE — 25000003 PHARM REV CODE 250: Performed by: STUDENT IN AN ORGANIZED HEALTH CARE EDUCATION/TRAINING PROGRAM

## 2022-07-11 PROCEDURE — 25000003 PHARM REV CODE 250: Performed by: INTERNAL MEDICINE

## 2022-07-11 PROCEDURE — 25000242 PHARM REV CODE 250 ALT 637 W/ HCPCS: Performed by: HOSPITALIST

## 2022-07-11 PROCEDURE — 94664 DEMO&/EVAL PT USE INHALER: CPT

## 2022-07-11 PROCEDURE — 83735 ASSAY OF MAGNESIUM: CPT | Performed by: STUDENT IN AN ORGANIZED HEALTH CARE EDUCATION/TRAINING PROGRAM

## 2022-07-11 PROCEDURE — 63600175 PHARM REV CODE 636 W HCPCS: Performed by: HOSPITALIST

## 2022-07-11 PROCEDURE — 99900035 HC TECH TIME PER 15 MIN (STAT)

## 2022-07-11 PROCEDURE — 36415 COLL VENOUS BLD VENIPUNCTURE: CPT | Performed by: STUDENT IN AN ORGANIZED HEALTH CARE EDUCATION/TRAINING PROGRAM

## 2022-07-11 PROCEDURE — 99232 PR SUBSEQUENT HOSPITAL CARE,LEVL II: ICD-10-PCS | Mod: ,,, | Performed by: NURSE PRACTITIONER

## 2022-07-11 RX ORDER — IBUPROFEN 200 MG
24 TABLET ORAL
Status: DISCONTINUED | OUTPATIENT
Start: 2022-07-11 | End: 2022-07-11 | Stop reason: HOSPADM

## 2022-07-11 RX ORDER — INSULIN ASPART 100 [IU]/ML
0-5 INJECTION, SOLUTION INTRAVENOUS; SUBCUTANEOUS
Status: DISCONTINUED | OUTPATIENT
Start: 2022-07-11 | End: 2022-07-11 | Stop reason: HOSPADM

## 2022-07-11 RX ORDER — INSULIN ASPART 100 [IU]/ML
6 INJECTION, SOLUTION INTRAVENOUS; SUBCUTANEOUS
Status: DISCONTINUED | OUTPATIENT
Start: 2022-07-12 | End: 2022-07-11

## 2022-07-11 RX ORDER — INSULIN ASPART 100 [IU]/ML
8 INJECTION, SOLUTION INTRAVENOUS; SUBCUTANEOUS
Status: DISCONTINUED | OUTPATIENT
Start: 2022-07-12 | End: 2022-07-11 | Stop reason: HOSPADM

## 2022-07-11 RX ORDER — IBUPROFEN 200 MG
16 TABLET ORAL
Status: DISCONTINUED | OUTPATIENT
Start: 2022-07-11 | End: 2022-07-11 | Stop reason: HOSPADM

## 2022-07-11 RX ORDER — INSULIN ASPART 100 [IU]/ML
8 INJECTION, SOLUTION INTRAVENOUS; SUBCUTANEOUS
Qty: 2.4 ML | Refills: 11 | Status: SHIPPED | OUTPATIENT
Start: 2022-07-11 | End: 2022-09-28

## 2022-07-11 RX ORDER — IBUPROFEN 200 MG
24 TABLET ORAL
Status: DISCONTINUED | OUTPATIENT
Start: 2022-07-11 | End: 2022-07-11

## 2022-07-11 RX ORDER — GLUCAGON 1 MG
1 KIT INJECTION
Status: DISCONTINUED | OUTPATIENT
Start: 2022-07-11 | End: 2022-07-11 | Stop reason: HOSPADM

## 2022-07-11 RX ORDER — INSULIN ASPART 100 [IU]/ML
8 INJECTION, SOLUTION INTRAVENOUS; SUBCUTANEOUS
Qty: 2.4 ML | Refills: 11 | Status: SHIPPED | OUTPATIENT
Start: 2022-07-12 | End: 2022-09-28

## 2022-07-11 RX ORDER — INSULIN ASPART 100 [IU]/ML
0-5 INJECTION, SOLUTION INTRAVENOUS; SUBCUTANEOUS
Qty: 3 ML | Refills: 0
Start: 2022-07-11 | End: 2022-09-28

## 2022-07-11 RX ORDER — IBUPROFEN 200 MG
16 TABLET ORAL
Status: DISCONTINUED | OUTPATIENT
Start: 2022-07-11 | End: 2022-07-11

## 2022-07-11 RX ORDER — CARVEDILOL 12.5 MG/1
12.5 TABLET ORAL 2 TIMES DAILY WITH MEALS
Qty: 60 TABLET | Refills: 11 | Status: ON HOLD | OUTPATIENT
Start: 2022-07-11 | End: 2022-11-02 | Stop reason: HOSPADM

## 2022-07-11 RX ORDER — INSULIN ASPART 100 [IU]/ML
1-10 INJECTION, SOLUTION INTRAVENOUS; SUBCUTANEOUS
Status: DISCONTINUED | OUTPATIENT
Start: 2022-07-11 | End: 2022-07-11

## 2022-07-11 RX ORDER — INSULIN ASPART 100 [IU]/ML
10 INJECTION, SOLUTION INTRAVENOUS; SUBCUTANEOUS
Status: DISCONTINUED | OUTPATIENT
Start: 2022-07-11 | End: 2022-07-11 | Stop reason: HOSPADM

## 2022-07-11 RX ORDER — SODIUM CHLORIDE FOR INHALATION 3 %
4 VIAL, NEBULIZER (ML) INHALATION 2 TIMES DAILY
Status: DISCONTINUED | OUTPATIENT
Start: 2022-07-11 | End: 2022-07-11 | Stop reason: HOSPADM

## 2022-07-11 RX ORDER — FUROSEMIDE 20 MG/1
20 TABLET ORAL DAILY
Qty: 30 TABLET | Refills: 0 | Status: SHIPPED | OUTPATIENT
Start: 2022-07-11 | End: 2022-12-07

## 2022-07-11 RX ORDER — PREDNISONE 10 MG/1
TABLET ORAL
Qty: 31 TABLET | Refills: 0 | Status: SHIPPED | OUTPATIENT
Start: 2022-07-11 | End: 2022-07-25

## 2022-07-11 RX ORDER — INSULIN ASPART 100 [IU]/ML
8 INJECTION, SOLUTION INTRAVENOUS; SUBCUTANEOUS
Qty: 2.4 ML | Refills: 11
Start: 2022-07-11 | End: 2022-09-28

## 2022-07-11 RX ORDER — INSULIN ASPART 100 [IU]/ML
7 INJECTION, SOLUTION INTRAVENOUS; SUBCUTANEOUS
Status: DISCONTINUED | OUTPATIENT
Start: 2022-07-11 | End: 2022-07-11

## 2022-07-11 RX ORDER — LEVOFLOXACIN 500 MG/1
500 TABLET, FILM COATED ORAL EVERY OTHER DAY
Qty: 3 TABLET | Refills: 0 | Status: SHIPPED | OUTPATIENT
Start: 2022-07-11 | End: 2022-07-17

## 2022-07-11 RX ORDER — GLUCAGON 1 MG
1 KIT INJECTION
Status: DISCONTINUED | OUTPATIENT
Start: 2022-07-11 | End: 2022-07-11

## 2022-07-11 RX ORDER — INSULIN ASPART 100 [IU]/ML
6 INJECTION, SOLUTION INTRAVENOUS; SUBCUTANEOUS
Status: DISCONTINUED | OUTPATIENT
Start: 2022-07-11 | End: 2022-07-11

## 2022-07-11 RX ORDER — INSULIN ASPART 100 [IU]/ML
10 INJECTION, SOLUTION INTRAVENOUS; SUBCUTANEOUS
Qty: 3 ML | Refills: 11
Start: 2022-07-11 | End: 2022-09-28

## 2022-07-11 RX ORDER — INSULIN ASPART 100 [IU]/ML
10 INJECTION, SOLUTION INTRAVENOUS; SUBCUTANEOUS
Qty: 3 ML | Refills: 11 | Status: SHIPPED | OUTPATIENT
Start: 2022-07-12 | End: 2022-09-28

## 2022-07-11 RX ADMIN — INSULIN ASPART 10 UNITS: 100 INJECTION, SOLUTION INTRAVENOUS; SUBCUTANEOUS at 06:07

## 2022-07-11 RX ADMIN — INSULIN ASPART 6 UNITS: 100 INJECTION, SOLUTION INTRAVENOUS; SUBCUTANEOUS at 12:07

## 2022-07-11 RX ADMIN — INSULIN ASPART 6 UNITS: 100 INJECTION, SOLUTION INTRAVENOUS; SUBCUTANEOUS at 09:07

## 2022-07-11 RX ADMIN — CARVEDILOL 12.5 MG: 12.5 TABLET, FILM COATED ORAL at 06:07

## 2022-07-11 RX ADMIN — IPRATROPIUM BROMIDE AND ALBUTEROL SULFATE 3 ML: 2.5; .5 SOLUTION RESPIRATORY (INHALATION) at 02:07

## 2022-07-11 RX ADMIN — HEPARIN SODIUM 5000 UNITS: 5000 INJECTION INTRAVENOUS; SUBCUTANEOUS at 02:07

## 2022-07-11 RX ADMIN — SODIUM CHLORIDE SOLN NEBU 3% 4 ML: 3 NEBU SOLN at 11:07

## 2022-07-11 RX ADMIN — FLUTICASONE FUROATE AND VILANTEROL TRIFENATATE 1 PUFF: 200; 25 POWDER RESPIRATORY (INHALATION) at 09:07

## 2022-07-11 RX ADMIN — FUROSEMIDE 20 MG: 20 TABLET ORAL at 08:07

## 2022-07-11 RX ADMIN — INSULIN DETEMIR 3 UNITS: 100 INJECTION, SOLUTION SUBCUTANEOUS at 12:07

## 2022-07-11 RX ADMIN — INSULIN ASPART 4 UNITS: 100 INJECTION, SOLUTION INTRAVENOUS; SUBCUTANEOUS at 12:07

## 2022-07-11 RX ADMIN — PREDNISONE 40 MG: 20 TABLET ORAL at 08:07

## 2022-07-11 RX ADMIN — PANCRELIPASE 1 CAPSULE: 24000; 76000; 120000 CAPSULE, DELAYED RELEASE PELLETS ORAL at 12:07

## 2022-07-11 RX ADMIN — PANCRELIPASE 1 CAPSULE: 24000; 76000; 120000 CAPSULE, DELAYED RELEASE PELLETS ORAL at 06:07

## 2022-07-11 RX ADMIN — INSULIN ASPART 2 UNITS: 100 INJECTION, SOLUTION INTRAVENOUS; SUBCUTANEOUS at 06:07

## 2022-07-11 RX ADMIN — CARVEDILOL 12.5 MG: 12.5 TABLET, FILM COATED ORAL at 08:07

## 2022-07-11 RX ADMIN — IPRATROPIUM BROMIDE AND ALBUTEROL SULFATE 3 ML: 2.5; .5 SOLUTION RESPIRATORY (INHALATION) at 07:07

## 2022-07-11 RX ADMIN — VALSARTAN 160 MG: 160 TABLET, FILM COATED ORAL at 08:07

## 2022-07-11 RX ADMIN — CLOPIDOGREL 75 MG: 75 TABLET, FILM COATED ORAL at 08:07

## 2022-07-11 RX ADMIN — HEPARIN SODIUM 5000 UNITS: 5000 INJECTION INTRAVENOUS; SUBCUTANEOUS at 06:07

## 2022-07-11 RX ADMIN — PANCRELIPASE 1 CAPSULE: 24000; 76000; 120000 CAPSULE, DELAYED RELEASE PELLETS ORAL at 08:07

## 2022-07-11 RX ADMIN — SPIRONOLACTONE 25 MG: 25 TABLET, FILM COATED ORAL at 08:07

## 2022-07-11 NOTE — DISCHARGE SUMMARY
[facility] - Telemetry [facility] Medicine  Discharge Summary      Patient Name: Sussy Costa  MRN: [id number]  Patient Class: IP- Inpatient  Admission Date: 7/5/2022  Hospital Length of Stay: 6 days  Discharge Date and Time:  07/11/2022 2:16 PM  Attending Physician: Arnel Shell MD   Discharging Provider: Arnel Shell MD  Primary Care Provider: EZ Casillas MD  Hospital Medicine Team: [facility] MED Z Arnel Shell MD    HPI:   This is an 85yo female with a h/o HTN, DIABETES MELITIS, asthma who presented to [facility] secondary to sudden onset dyspnea and chest pain on day of admission.  ECG demonstrated ST elevations anteriour leads.  Cardiology admitted patient and she was given asa, Plavix, and heparin.  Repeat ECG looked improved and the patient's chest pain resolved.  She was then transferred to hospitalist service for asthma.         * No surgery found *      Hospital Course:   Echocardiogram demonstrated normal LVEF with no WMA.  Troponin normal.  Patient placed on solu medrol, bronchodilators, inhaled corticosteroids.  Remains on supplemental O2.  Repeat CXR demonstrates bibasiler opacities.  Previous specimen from bronchoscopy May 2022 grew pan-sensitive pseudomonas.    Now covering with levofloxacin. Pulmonary consulted secondary to patient's lack of improvement and continued bronchospasms and need for supplemental O2.  Patient then given treatments with nebulized saline, CPT, flutter valve.   Patient has improved since admission and is eager for discharge.  She will need to be discharged on home O2 and follow up with her pulmonologist as OP.        Goals of Care Treatment Preferences:  Code Status: Full Code      Consults:   Consults (From admission, onward)        Status Ordering Provider     Inpatient consult to Pulmonology  Once        Provider:  Papa Peguero MD    Completed ARNEL SHELL     Inpatient consult to Endocrinology  Once        Provider:  Joshua Martines MD     Completed ML BASURTO     Inpatient consult to Midline team  Once        Provider:  (Not yet assigned)    Completed RAYMON CASILLAS          * Exacerbation of asthma  - bronchodilators, inhaled corticosteroids  - systemic steroids  - supplemental O2  - CXR demonstrates bibasiler opacities   - on abx therapy  - consult pulm secondary to not improving  - remains on supplemental O2  - CPT and nebulized saline    Dyspnea  - secondary to asthma exacerbation  - supplemental O2   - treat asthma exacerbation  - improving but will need home O2      Pneumonia of left lower lobe due to Pseudomonas species  - patient had bronchoscopy for left lower lobe atelectasis in May 2022.  Copious amounts of respiratory secretions were seen and collected.  Cultures grew pseudomonas.  Not treated at that time.  Now treating with levofloxacin.       Chest tightness  Initial concern for stemi but cardiac enzymes normal  - has since resolved      Hypertensive urgency  - losartan, coreg  - monitor and adjust medication regimen as needed      Adverse effect of corticosteroids  - elevated blood glucose levels  - endocrine consulted  - follow up with endocrinology as OP      Final Active Diagnoses:    Diagnosis Date Noted POA    PRINCIPAL PROBLEM:  Exacerbation of asthma [J45.901] 04/22/2013 Unknown    Dyspnea [R06.00] 07/05/2022 Yes    Pneumonia of left lower lobe due to Pseudomonas species [J15.1] 07/10/2022 Yes    Chest tightness [R07.89] 07/07/2022 Unknown    Hypertensive urgency [I16.0] 07/07/2022 Unknown    Type 2 diabetes mellitus with hyperglycemia, with long-term current use of insulin [E11.65, Z79.4] 09/24/2012 Not Applicable    Adverse effect of corticosteroids [T38.0X5A] 07/06/2022 Unknown    Moderate protein-calorie malnutrition [E44.0] 07/06/2022 Yes    Hyperlipidemia [E78.5]  Yes    HTN (hypertension) [I10] 09/24/2012 Yes      Problems Resolved During this Admission:       Discharged Condition: good    Disposition:      Follow Up:   Follow-up Information     [facility] [place] [facility]inology. Schedule an appointment as soon as possible for a visit in 1 week(s).    Contact information:  Dr. Conrad PELAEZ [facility] ENDOCRINOLOGY. Schedule an appointment as soon as possible for a visit in 1 week(s).    Specialty: Endocrinology  Contact information:  Karoline Pacheco  [facility] [address]  [phone]                     Patient Instructions:      Ambulatory referral/consult to Pulmonology   Standing Status: Future   Referral Priority: Routine Referral Type: Consultation   Referral Reason: Specialty Services Required   Referred to Provider: LOWELL SILVEIRA Requested Specialty: Pulmonary Disease   Number of Visits Requested: 1       Significant Diagnostic Studies: Labs:   BMP:   Recent Labs   Lab 07/10/22  0340 07/11/22  0240   * 122*    141   K 3.6 3.8    102   CO2 26 30*   BUN 27* 23   CREATININE 0.7 0.7   CALCIUM 8.2* 8.3*   MG 1.8 1.7   , CMP   Recent Labs   Lab 07/10/22  0340 07/11/22  0240    141   K 3.6 3.8    102   CO2 26 30*   * 122*   BUN 27* 23   CREATININE 0.7 0.7   CALCIUM 8.2* 8.3*   PROT 4.7* 5.0*   ALBUMIN 2.5* 2.5*   BILITOT 0.4 0.5   ALKPHOS 51* 51*   AST 63* 67*   ALT 58* 76*   ANIONGAP 8 9   ESTGFRAFRICA >60.0 >60.0   EGFRNONAA >60.0 >60.0    and CBC   Recent Labs   Lab 07/10/22  0340 07/11/22  0240   WBC 10.52 12.17   HGB 12.2 11.7*   HCT 38.3 36.3*    228       Pending Diagnostic Studies:     None         Medications:  Reconciled Home Medications:      Medication List      START taking these medications    levoFLOXacin 500 MG tablet  Commonly known as: LEVAQUIN  Take 1 tablet (500 mg total) by mouth every other day. for 6 days     predniSONE 10 MG tablet  Commonly known as: DELTASONE  Take 4 tablets (40 mg total) by mouth once daily for 4 days, THEN 2 tablets (20 mg total) once daily for 5 days, THEN 1 tablet (10 mg total) once daily for 5 days.  Start  "taking on: July 11, 2022        CHANGE how you take these medications    carvediloL 12.5 MG tablet  Commonly known as: COREG  Take 1 tablet (12.5 mg total) by mouth 2 (two) times daily with meals.  What changed:   · medication strength  · how much to take     * insulin aspart U-100 100 unit/mL (3 mL) Inpn pen  Commonly known as: NovoLOG  Inject 10 Units into the skin before dinner.  What changed:   · how much to take  · how to take this  · when to take this  · additional instructions     * insulin aspart U-100 100 unit/mL (3 mL) Inpn pen  Commonly known as: NovoLOG  Inject 8 Units into the skin before breakfast. Inject 8 Units into the skin before lunch.  What changed: You were already taking a medication with the same name, and this prescription was added. Make sure you understand how and when to take each.     * insulin aspart U-100 100 unit/mL (3 mL) Inpn pen  Commonly known as: NovoLOG  Inject 0-5 Units into the skin as needed (Hyperglycemia). **LOW CORRECTION DOSE**  Blood Glucose  mg/dL                  Pre-meal                2200  151-200                0 unit                      0 unit  201-250                2 units                    1 unit  251-300                3 units                    1 unit  301-350                4 units                    2 units  >350                     5 units                    3 units  Administer subcutaneously if needed at times designated by monitoring schedule.   DO NOT HOLD correction dose insulin for patients who are  NPO.  "HIGH ALERT MEDICATION" - Administer with meals or TF/TPN.  What changed: You were already taking a medication with the same name, and this prescription was added. Make sure you understand how and when to take each.         * This list has 3 medication(s) that are the same as other medications prescribed for you. Read the directions carefully, and ask your doctor or other care provider to review them with you.            CONTINUE taking these " medications    * albuterol 90 mcg/actuation inhaler  Commonly known as: PROAIR HFA  Inhale 2 puffs into the lungs every 6 (six) hours as needed for Wheezing. Rescue     * albuterol 2.5 mg /3 mL (0.083 %) nebulizer solution  Commonly known as: PROVENTIL  Take 3 mLs (2.5 mg total) by nebulization every 6 (six) hours as needed for Wheezing or Shortness of Breath. Rescue     atorvastatin 20 MG tablet  Commonly known as: LIPITOR  Take 1 tablet (20 mg total) by mouth every evening.     blood sugar diagnostic Strp  1 strip by Misc.(Non-Drug; Combo Route) route 4 (four) times daily with meals and nightly.     calcium carbonate 600 mg calcium (1,500 mg) Tab  Commonly known as: OS-JUNIE  Take 600 mg by mouth once.     CREON 24,000-76,000 -120,000 unit capsule  Generic drug: lipase-protease-amylase 24,000-76,000-120,000 units  Take 2 capsules with meals orally and 1 capsule with snacks.     fluticasone-salmeterol 500-50 mcg/dose 500-50 mcg/dose Dsdv diskus inhaler  Commonly known as: WIXELA INHUB  Inhale 1 puff into the lungs 2 (two) times daily. Controller     furosemide 20 MG tablet  Commonly known as: LASIX  Take 1 tablet (20 mg total) by mouth once daily.     GVOKE HYPOPEN 2-PACK 1 mg/0.2 mL Atin  Generic drug: glucagon  Inject 1 mg into the skin as needed (severe hypoglycemia).     irbesartan 300 MG tablet  Commonly known as: AVAPRO  TAKE 1 TABLET BY MOUTH EVERY EVENING     lancets Misc  Commonly known as: ACCU-CHEK FASTCLIX LANCET DRUM  CHECK BLOOD GLUCOSE FOUR TIMES DAILY     latanoprost 0.005 % ophthalmic solution  Inject into the eye. 1 Drops Ophthalmic Every evening     LEVEMIR FLEXTOUCH U-100 INSULN 100 unit/mL (3 mL) Inpn pen  Generic drug: insulin detemir U-100  Inject 3 Units into the skin 2 (two) times daily.     levocetirizine 5 MG tablet  Commonly known as: XYZAL  Take 1 tablet (5 mg total) by mouth every evening.     magnesium oxide 400 mg (241.3 mg magnesium) tablet  Commonly known as: MAG-OX  Take 400 mg by  "mouth once daily.     montelukast 10 mg tablet  Commonly known as: SINGULAIR  TAKE 1 TABLET BY MOUTH EVERY EVENING     neomycin-polymyxin-hydrocortisone 3.5-10,000-1 mg/mL-unit/mL-% otic suspension  Commonly known as: CORTISPORIN  Use bid for nails     olopatadine 0.1 % ophthalmic solution  Commonly known as: PATANOL  Place 1 drop into both eyes 2 (two) times daily as needed. 1 Drops Ophthalmic Twice a day     pen needle, diabetic 31 gauge x 5/16" Ndle  Inject 1 each into the skin 4 (four) times daily with meals and nightly. Use with insulin pen     vitamin D 1000 units Tab  Commonly known as: VITAMIN D3  Take 1,000 Units by mouth once daily.         * This list has 2 medication(s) that are the same as other medications prescribed for you. Read the directions carefully, and ask your doctor or other care provider to review them with you.            STOP taking these medications    budesonide 1 mg/2 mL Nbsp     BUDESONIDE 1 MG/NORMAL SALINE 50 ML NASAL IRRIGATION     risedronate 35 MG tablet  Commonly known as: ACTONEL            Indwelling Lines/Drains at time of discharge:   Lines/Drains/Airways     Drain  Duration           Female External Urinary Catheter 07/05/22 1819 5 days                Time spent on the discharge of patient: 25 minutes         Jenny Mckeon MD  Department of Hospital Medicine  [facility] - Telemetry Stepdown  "

## 2022-07-11 NOTE — SUBJECTIVE & OBJECTIVE
"Interval HPI:   Overnight events:  BG stable and within goal while on current IV/SQ insulin regimen. Patient remains on steroid therapy while inpatient. Endocrinology will continue to follow, and manage glycemic control while inpatient, and provide discharge recommendations for home insulin therapy.       Diet diabetic [facility]; 1500 Calorie; Consistent Carbohydrate       Eatin% - 100%  Nausea: No  Hypoglycemia and intervention: No  Fever: No  TPN and/or TF: No  If yes, type of TF/TPN and rate: None    BP (!) 153/73   Pulse 68   Temp 97.5 °F (36.4 °C) (Oral)   Resp 16   Ht 5' 5" (1.651 m)   Wt 44.5 kg (98 lb)   SpO2 98%   BMI 16.31 kg/m²     Labs Reviewed and Include    Recent Labs   Lab 22  0240   *   CALCIUM 8.3*   ALBUMIN 2.5*   PROT 5.0*      K 3.8   CO2 30*      BUN 23   CREATININE 0.7   ALKPHOS 51*   ALT 76*   AST 67*   BILITOT 0.5     Lab Results   Component Value Date    WBC 12.17 2022    HGB 11.7 (L) 2022    HCT 36.3 (L) 2022    MCV 93 2022     2022     Recent Labs   Lab 22  1316   TSH 2.897     Lab Results   Component Value Date    HGBA1C 8.2 (H) 2022       Nutritional status:   Body mass index is 16.31 kg/m².  Lab Results   Component Value Date    ALBUMIN 2.5 (L) 2022    ALBUMIN 2.5 (L) 07/10/2022    ALBUMIN 2.6 (L) 2022     No results found for: PREALBUMIN    Estimated Creatinine Clearance: 41.3 mL/min (based on SCr of 0.7 mg/dL).    Accu-Checks  Recent Labs     07/10/22  0212 07/10/22  0612 07/10/22  0738 07/10/22  1121 07/10/22  1728 07/10/22  2141 07/10/22  2221 22  0235 22  0628 22  0809   POCTGLUCOSE 196* 236* 243* 414* 359* 248* 209* 126* 149* 169*       Current Medications and/or Treatments Impacting Glycemic Control  Immunotherapy:    Immunosuppressants       None          Steroids:   Hormones (From admission, onward)                Start     Stop Route Frequency Ordered "    07/10/22 1245  predniSONE tablet 40 mg         -- Oral Daily 07/10/22 1134    07/05/22 1448  melatonin tablet 6 mg         -- Oral Nightly PRN 07/05/22 1349          Pressors:    Autonomic Drugs (From admission, onward)                Start     Stop Route Frequency Ordered    07/05/22 1717  EPINEPHrine injection 0.3 mg         -- IM As needed (PRN) 07/05/22 1618          Hyperglycemia/Diabetes Medications:   Antihyperglycemics (From admission, onward)                Start     Stop Route Frequency Ordered    07/10/22 1300  insulin regular in 0.9 % NaCl 100 unit/100 mL (1 unit/mL) infusion        Question:  Enter initial dose (Units/hr):  Answer:  0.9    -- IV Continuous 07/10/22 1257    07/07/22 1130  insulin aspart U-100 pen 6 Units         -- SubQ with lunch 07/06/22 1911 07/07/22 0715  insulin aspart U-100 pen 6 Units         -- SubQ with breakfast 07/06/22 1911 07/06/22 2011  insulin aspart U-100 pen 0-4 Units         -- SubQ As needed (PRN) 07/06/22 1911 07/06/22 1915  insulin aspart U-100 pen 7 Units         -- SubQ with dinner 07/06/22 1914

## 2022-07-11 NOTE — PLAN OF CARE
Problem: Adult Inpatient Plan of Care  Goal: Plan of Care Review  Outcome: Adequate for Care Transition  Goal: Patient-Specific Goal (Individualized)  Outcome: Adequate for Care Transition  Goal: Absence of Hospital-Acquired Illness or Injury  Outcome: Adequate for Care Transition  Goal: Optimal Comfort and Wellbeing  Outcome: Adequate for Care Transition  Goal: Readiness for Transition of Care  Outcome: Adequate for Care Transition     Problem: Diabetes Comorbidity  Goal: Blood Glucose Level Within Targeted Range  Outcome: Adequate for Care Transition     Problem: Adjustment to Illness (Sepsis/Septic Shock)  Goal: Optimal Coping  Outcome: Adequate for Care Transition     Problem: Bleeding (Sepsis/Septic Shock)  Goal: Absence of Bleeding  Outcome: Adequate for Care Transition     Problem: Glycemic Control Impaired (Sepsis/Septic Shock)  Goal: Blood Glucose Level Within Desired Range  Outcome: Adequate for Care Transition     Problem: Infection Progression (Sepsis/Septic Shock)  Goal: Absence of Infection Signs and Symptoms  Outcome: Adequate for Care Transition     Problem: Nutrition Impaired (Sepsis/Septic Shock)  Goal: Optimal Nutrition Intake  Outcome: Adequate for Care Transition     Problem: Fluid Imbalance (Pneumonia)  Goal: Fluid Balance  Outcome: Adequate for Care Transition     Problem: Infection (Pneumonia)  Goal: Resolution of Infection Signs and Symptoms  Outcome: Adequate for Care Transition     Problem: Respiratory Compromise (Pneumonia)  Goal: Effective Oxygenation and Ventilation  Outcome: Adequate for Care Transition     Problem: Fall Injury Risk  Goal: Absence of Fall and Fall-Related Injury  Outcome: Adequate for Care Transition     Problem: Infection  Goal: Absence of Infection Signs and Symptoms  Outcome: Adequate for Care Transition     Problem: Skin Injury Risk Increased  Goal: Skin Health and Integrity  Outcome: Adequate for Care Transition

## 2022-07-11 NOTE — ASSESSMENT & PLAN NOTE
- bronchodilators, inhaled corticosteroids  - systemic steroids  - supplemental O2  - CXR demonstrates bibasiler opacities   - on abx therapy  - consult pulm secondary to not improving  - remains on supplemental O2  - CPT and nebulized saline

## 2022-07-11 NOTE — PROGRESS NOTES
[facility] - Telemetry Stepdown  Endocrinology  Progress Note    Admit Date: 2022     Reason for Consult: Management of T2DM, Hyperglycemia     Surgical Procedure and Date: N/A    Diabetes diagnosis year: about 25 years ago    Home Diabetes Medications:  Novolog 6 units with breakfast and lunch, 7 units with dinner, Levemir 3 units BID     How often checking glucose at home?  4x daily    BG readings on regimen: mostly above 200  Hypoglycemia on the regimen?  No  Missed doses on regimen?  No    Diabetes Complications include:     Hyperglycemia    Complicating diabetes co morbidities:   HTN, HLD      HPI:   Patient is a 86 y.o. female with a diagnosis of hypertension, hyperlipidemia, diabetes mellitus, and asthma who presents to hospital secondary to dyspnea associated chest tightness. In the ED, patient was mildly hypertensive with systolic pressure 150s initially increased to 180s.  EKG on admission showing STEMI with sinus rhythm with ST elevations and T-wave inversions anteriorly however repeat EKG similar to baseline (2021) showing sinus rhythm with incomplete right bundle branch block and T-wave inversions anteriorly..  Patient is asymptomatic currently denies any dyspnea or chest tightness/pain, or pressure.  Admitted to SICU. Patient last seen by Dr. Martines at [facility] endocrine clinic for DM management on 22.    Lab Results   Component Value Date    HGBA1C 8.2 (H) 2022              Interval HPI:   Overnight events:  BG stable and within goal while on current IV/SQ insulin regimen. Patient remains on steroid therapy while inpatient. Endocrinology will continue to follow, and manage glycemic control while inpatient, and provide discharge recommendations for home insulin therapy.       Diet diabetic [facility]; 1500 Calorie; Consistent Carbohydrate       Eatin% - 100%  Nausea: No  Hypoglycemia and intervention: No  Fever: No  TPN and/or TF: No  If yes, type of TF/TPN and rate: None    BP  "(!) 153/73   Pulse 68   Temp 97.5 °F (36.4 °C) (Oral)   Resp 16   Ht 5' 5" (1.651 m)   Wt 44.5 kg (98 lb)   SpO2 98%   BMI 16.31 kg/m²     Labs Reviewed and Include    Recent Labs   Lab 07/11/22  0240   *   CALCIUM 8.3*   ALBUMIN 2.5*   PROT 5.0*      K 3.8   CO2 30*      BUN 23   CREATININE 0.7   ALKPHOS 51*   ALT 76*   AST 67*   BILITOT 0.5     Lab Results   Component Value Date    WBC 12.17 07/11/2022    HGB 11.7 (L) 07/11/2022    HCT 36.3 (L) 07/11/2022    MCV 93 07/11/2022     07/11/2022     Recent Labs   Lab 07/05/22  1316   TSH 2.897     Lab Results   Component Value Date    HGBA1C 8.2 (H) 07/05/2022       Nutritional status:   Body mass index is 16.31 kg/m².  Lab Results   Component Value Date    ALBUMIN 2.5 (L) 07/11/2022    ALBUMIN 2.5 (L) 07/10/2022    ALBUMIN 2.6 (L) 07/09/2022     No results found for: PREALBUMIN    Estimated Creatinine Clearance: 41.3 mL/min (based on SCr of 0.7 mg/dL).    Accu-Checks  Recent Labs     07/10/22  0212 07/10/22  0612 07/10/22  0738 07/10/22  1121 07/10/22  1728 07/10/22  2141 07/10/22  2221 07/11/22  0235 07/11/22  0628 07/11/22  0809   POCTGLUCOSE 196* 236* 243* 414* 359* 248* 209* 126* 149* 169*       Current Medications and/or Treatments Impacting Glycemic Control  Immunotherapy:    Immunosuppressants       None          Steroids:   Hormones (From admission, onward)                Start     Stop Route Frequency Ordered    07/10/22 1245  predniSONE tablet 40 mg         -- Oral Daily 07/10/22 1134    07/05/22 1448  melatonin tablet 6 mg         -- Oral Nightly PRN 07/05/22 1349          Pressors:    Autonomic Drugs (From admission, onward)                Start     Stop Route Frequency Ordered    07/05/22 1717  EPINEPHrine injection 0.3 mg         -- IM As needed (PRN) 07/05/22 1618          Hyperglycemia/Diabetes Medications:   Antihyperglycemics (From admission, onward)                Start     Stop Route Frequency Ordered    07/10/22 " 1300  insulin regular in 0.9 % NaCl 100 unit/100 mL (1 unit/mL) infusion        Question:  Enter initial dose (Units/hr):  Answer:  0.9    -- IV Continuous 07/10/22 1257    07/07/22 1130  insulin aspart U-100 pen 6 Units         -- SubQ with lunch 07/06/22 1911 07/07/22 0715  insulin aspart U-100 pen 6 Units         -- SubQ with breakfast 07/06/22 1911 07/06/22 2011  insulin aspart U-100 pen 0-4 Units         -- SubQ As needed (PRN) 07/06/22 1911 07/06/22 1915  insulin aspart U-100 pen 7 Units         -- SubQ with dinner 07/06/22 1914            ASSESSMENT and PLAN    * Exacerbation of asthma  Managed per primary team  Avoid hypoglycemia        Type 2 diabetes mellitus with hyperglycemia, with long-term current use of insulin  BG goal 140 - 180     - Discontinue IV insulin infusion orders. Preparation for discharge.   - Start Levemir 3 units BID. First dose now. Equal to home basal dosing.   - Continue Novolog 6 units with breakfast and lunch, and 7 units with dinner.   - Low Dose SQ Insulin Correction Scale PRN subsequent hyperglycemia.   - BG Monitoring AC/HS.     ** Please call Endocrine for any BG related issues **  ** Please notify Endocrine for any change and/or advance in diet**    Lab Results   Component Value Date    HGBA1C 8.2 (H) 07/05/2022       Discharge Planning:   - PRN refill of Insurance preferred diabetes testing supplies  - Continue Levemir 3 units BID.   - Increase Novolog 8 units with breakfast and lunch, and 10 units with dinner.   - Low Dose SQ Insulin Correction Scale:     150 - 200 + 1 unit     201 - 250 + 2 units     251 - 300 + 3 units     301 - 350 + 4 units      > 350   + 5 units  - Patient is to follow-[place]. Martines [place] [facility] [place] for continued DM management and care.               Hyperlipidemia  May increase insulin resistance.         HTN (hypertension)  Managed per primary team  Condition may cause insulin resistance         Adverse effect of  corticosteroids  Glucocorticoids markedly increase glucoses. Expect increase insulin requirements while receiving steroids.             Clement Quick, NP  Endocrinology  [facility] - Telemetry Stepdown

## 2022-07-11 NOTE — PLAN OF CARE
Problem: Adult Inpatient Plan of Care  Goal: Absence of Hospital-Acquired Illness or Injury  Intervention: Identify and Manage Fall Risk  Flowsheets (Taken 7/11/2022 0249)  Safety Promotion/Fall Prevention:   assistive device/personal item within reach   side rails raised x 2   Fall Risk reviewed with patient/family   lighting adjusted  Intervention: Prevent Skin Injury  Flowsheets (Taken 7/11/2022 0249)  Body Position: position changed independently  Intervention: Prevent and Manage VTE (Venous Thromboembolism) Risk  Flowsheets (Taken 7/11/2022 0249)  Activity Management:   Arm raise - L1   Up in chair - L3  Intervention: Prevent Infection  Flowsheets (Taken 7/11/2022 0249)  Infection Prevention:   environmental surveillance performed   hand hygiene promoted   personal protective equipment utilized   rest/sleep promoted  Goal: Optimal Comfort and Wellbeing  Intervention: Monitor Pain and Promote Comfort  Flowsheets (Taken 7/11/2022 0249)  Pain Management Interventions: quiet environment facilitated  Intervention: Provide Person-Centered Care  Flowsheets (Taken 7/11/2022 0249)  Trust Relationship/Rapport:   care explained   choices provided   emotional support provided   empathic listening provided   questions answered   questions encouraged   thoughts/feelings acknowledged   Pt in bed, resp even and unlabored.  No s/s of distress noted at this time. BG being monitored Q4H.  Safety measures in progress, call light within reach.

## 2022-07-11 NOTE — NURSING
6 minute walk test:    Sat on room air at rest: 100%  Sat on room air with ambulation: lowest 90%    Immediately recovered to upper 90's from 90% when ambulating. Maintained above 95% most of time ambulating. No SOB noted.

## 2022-07-11 NOTE — TELEPHONE ENCOUNTER
Patient discharged on steroid taper. Novolog was adjusted at discharged. Patient will need follow-up with Dr. Martines ( can be virtual) for insulin dosing re-evaluation once home steroid taper is complete.

## 2022-07-11 NOTE — ASSESSMENT & PLAN NOTE
[address] [facility]  Outpatient Physical Therapy    Treatment Note        Date: 3/9/2022  Patient: Rock Montenegro  : 1954  ACCT #: [de-identified]  Referring Practitioner: Christa Hood M.D. Diagnosis: Sprain of the left knee  Treatment Diagnosis: Painful and weak left knee with antalgic gait pattern     Visit Information:  PT Visit Information  Onset Date: 22  PT Insurance Information: [facility] [employer] [place]  [id number]  Total # of Visits Approved: 12  Total # of Visits to Date: 6  Plan of Care/Certification Expiration Date: 03/15/22  No Show: 0  Canceled Appointment: 0  Progress Note Counter:     Subjective: Pt reports increased pain following last tx session, pt reports performing TKE with resistance band at home with reported pain along medial side of knee following HEP performed at home. Comments: Reviewed with pt that T-band TKEs were not assinged to HEP as it had only been performed x1 in therapy and was ready to add to HEP  HEP Compliance:  [x] Good [] Fair [] Poor [] Reports not doing due to:    Vital Signs  Patient Currently in Pain: Yes   Pain Screening  Patient Currently in Pain: Yes  Pain Assessment  Pain Assessment: 0-10  Pain Level: 4  Pain Type: Acute pain  Pain Location: Knee  Pain Orientation: Left  Pain Descriptors: Aching    OBJECTIVE:   Exercises  Exercise 1: Quad sets 5''x10  Exercise 2: SLR 3-way, Clams   x10  Exercise 3: heel slides w/ strap supine 10''x10  Exercise 4: SAQ  3''x15  Exercise 5: LAQ / Seated March L LE 2# x10 ea. Exercise 6: seated hip abd w/ RTB 5''x10; hip add w/ ball 5''x10  Exercise 10: Sports Art Recumb. Bike 1/2 rev. x 5min. ( full rev in retro last 60 sec.)  Exercise 11: SLS 20s hold x 3 Daniel. Exercise 13:  Step Ups 4 inch box Fwd x 10  Exercise 14: Sit-stand from 18 inch box + foam AirEx pad x 10       Strength: [x] NT  [] MMT completed:     ROM: [] NT  [x] ROM measurements:           AROM LLE (degrees)  L Knee Flexion 0-145: 108 deg BG goal 140 - 180     - Discontinue IV insulin infusion orders. Preparation for discharge.   - Start Levemir 3 units BID. First dose now. Equal to home basal dosing.   - Continue Novolog 6 units with breakfast and lunch, and 7 units with dinner.   - Low Dose SQ Insulin Correction Scale PRN subsequent hyperglycemia.   - BG Monitoring AC/HS.     ** Please call Endocrine for any BG related issues **  ** Please notify Endocrine for any change and/or advance in diet**    Lab Results   Component Value Date    HGBA1C 8.2 (H) 07/05/2022       Discharge Planning:   - PRN refill of Insurance preferred diabetes testing supplies  - Continue Levemir 3 units BID.   - Increase Novolog 8 units with breakfast and lunch, and 10 units with dinner.   - Low Dose SQ Insulin Correction Scale:     150 - 200 + 1 unit     201 - 250 + 2 units     251 - 300 + 3 units     301 - 350 + 4 units      > 350   + 5 units  - Patient is to follow-up with Dr. Martines with [facility] outpatient endocrinology clinic for continued DM management and care.              supine  L Knee Extension 0: -5          Modalities:  Modalities  Cryotherapy (Minutes\Location): concurrent w/ IFC for pain control x10 mins  E-stim (parameters): IFC to L knee post-tx for pain control x10 mins     *Indicates exercise, modality, or manual techniques to be initiated when appropriate    Assessment: Body structures, Functions, Activity limitations: Decreased functional mobility ,Decreased ROM,Decreased strength,Increased pain  Assessment: AROM of Left knee flexion remains same as previous visit while knee Ext continues to be limited by pain. Continued progression of exercises with addition of sit-stands and step ups to further progress strength to assist with ADLs. Treatment Diagnosis: Painful and weak left knee with antalgic gait pattern  Prognosis: Good       Goals:  Short term goals  Time Frame for Short term goals: 3-5 treatments  Short term goal 1: Increase flexion to 100 degrees, extension to 0  Short term goal 2: No further soft tissue swelling of the left knee  Short term goal 3: Pain decreased to 2/10    Long term goals  Time Frame for Long term goals : 6-12 treatment  Long term goal 1: Active range of motion of the left knee within normal limits  Long term goal 2: Strength of the left knee 5/5  Long term goal 3: Pain 0/10 to 1/10 left knee  Long term goal 4: Ambulate with reciprical gait with no antalgia  Long term goal 5: LEFI score will indicate no to minimal disabililty  Long term goal 6: Patient will be able to return to full duty  Progress toward goals:progression of exercises for improved strength    POST-PAIN       Pain Rating (0-10 pain scale):   0/10   Location and pain description same as pre-treatment unless indicated.    Action: [] NA   [x] Perform HEP  [] Meds as prescribed  [] Modalities as prescribed   [] Call Physician     Frequency/Duration:  Plan  Times per week: 2  Plan weeks: 6  Current Treatment Recommendations: Robb Duke

## 2022-07-11 NOTE — ASSESSMENT & PLAN NOTE
- secondary to asthma exacerbation  - supplemental O2   - treat asthma exacerbation  - improving but will need home O2

## 2022-07-12 ENCOUNTER — PATIENT OUTREACH (OUTPATIENT)
Dept: ADMINISTRATIVE | Facility: CLINIC | Age: 87
End: 2022-07-12
Payer: MEDICARE

## 2022-07-12 NOTE — NURSING
Supper eaten and insulin given. Instructions explained to patient and family. Teaching on insulin administration completed, patient voices understanding. Iv removed, belongings collected. Paper scripts x4 given to patient to bring to home pharmacy. Pt does not quality for oxygen, sat in upper 90's on room air. Ready for discharge.

## 2022-07-12 NOTE — PROGRESS NOTES
C3 nurse attempted to contact Sussy Csota for a TCC post hospital discharge follow up call. The patient is unable to conduct the call @ this time. The patient's spouse, Chuck Costa, requested a callback.    The patient has a scheduled HOSFU appointment with Leon Ramírez MD on 7/26/2022 @ 1000. Message sent to Physician staff that this appointment is not within 7 days of discharge date 7/11/2022.

## 2022-07-12 NOTE — PLAN OF CARE
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  Patient was hospitalized from 1/22/19 till 2/13/19 for myositis.      L breast cancer s/p completion of 4 cycles of demi-adjuvant taxotere and cytoxan (completed on 5/9/17) and mastectomy (6/26/17) and then 4 cycles of adjuvant Adriamycin (completed 9/12/17). In 10/2017 - patient developed L supraclavicular lymphadenopathy which FNA confirmed as reoccurrence of previously treated breast cancer.      Patient started on Atelizumab/Abraxane on 11/7/18. Per chart review, patient noticed rashes on the dorsum of her hands on 11/11/18. Seen in urgent care and given topical steroid cream. Then received two more infusions on Atelizumab/Abraxane on 11/21/18 and 12/5/18. Started to notice puffiness around the eyes on 12/11/18. Received another Abraxane infusion on 12/12/18 but this time with hydrocortisone 50 mg which helped reduce the swelling around the eyes. Developed swelling of the face again on 12/15/18. Next infusion of Abraxane done on 12/19/18 with Solucortef and Atelizumab held. Abraxane given again on 1/3/19 with no IV steroid. Patient developed swelling of the face on 1/4/19 and went to urgent care and given short course of prednisone 20 mg BID. On 1/15/19, patient seen in hem/onc clinic with c/o of pressure and tightness around neck. CT scan of chest and neck did not reveal any vascular compression. Started on prednisone 60 mg with taper. On 1/22/18 patient with c/o proximal muscle weakness. CPK found to be elevated around 4k. Patient  Markos Pacheco - Telemetry Stepdown  Discharge Final Note    Primary Care Provider: EZ Casillas MD    Expected Discharge Date: 7/11/2022       Future Appointments   Date Time Provider Department Center   7/12/2022 12:45 PM LAB, APPOINTMENT [place] [facility] VNP [facility]   7/12/2022  1:00 PM LAB, APPOINTMENT [facility] [facility] VNP [facility]   7/18/2022  1:15 PM [facility] INF [facility]   7/26/2022 10:00 AM Leon Ramírez MD [facility] IM [place]   7/26/2022  1:30 PM DIABETES DISCHARGE CLINIC [facility] ENDODIA [facility]   8/11/2022  9:30 AM Max Rosado MD [facility] AENDGAS [facility]   11/8/2022 11:00 AM Joshua Martines MD [facility] ENDOQUOC [facility]         Final Discharge Note (most recent)     Final Note - 07/12/22 0740        Final Note    Assessment Type Final Discharge Note     Anticipated Discharge Disposition Home or Self Care     What phone number can be called within the next 1-3 days to see how you are doing after discharge? [phone]     Hospital Resources/Appts/Education Provided Appointments scheduled and added to AVS   Ambulatory referral sent to Pulmonology.       Post-Acute Status    Post-Acute Authorization Other     Other Status No Post-Acute Service Needs                 Important Message from Medicare             Contact Info     [facility]    Dr. Martines       Next Steps: Schedule an appointment as soon as possible for a visit in 1 week(s)    [facility] ENDOCRINOLOGY   Specialty: Endocrinology    [address] [facility] [address]   Phone: [phone]       Next Steps: Schedule an appointment as soon as possible for a visit in 1 week(s)          Bonnie Umanzor, RN  Ext [phone]   admitted and given solumedrol 80 mg IV on 1/22/18. Last infusion of Atelizumab on 12/19/18. Last infusion of Abraxane on 1/3/19. Given Solumedrol 1g x 1 on 1/23/18. Seen by Dermatology on 1/24/18 and biopsy done of skin rash. Given distribution of rashes, there was concern for dermatomyositis. Patient started on Solumedrol 125 mg IV BID at this time.     During her hospitalization patient was started on high dose steroid Solumedrol 80mg IV x 1, 1g x 1, and then 125mg BID until tapered down to medrol 48mg.  Skin biopsy result was consistent with dermatomyositis.  Patient was started on IVIG (400mg/kg/daily x 5 day) 1/29/19-2/2.   Patient started on MTX 20mg SQ 2/5 with folic acid. MTX SQ was transitioned to PO because concern about rehab administration.  Patient failed swallow eval and PEG tube was placed.        Denies any family history of autoimmune diseases.     No smoking, EtOH, recreational drug usage.     Denies any photosensitivity, joint swelling, unintentional weigh loss, abdominal pain, night sweats, CP, SOB.  +oral thrush.     Completed rehab at [facility].  Completed IVIG (2/28 and 3/1).  Had mild HA after infusion.  Doing well.  A lot stronger - able to do household chores since discharge.  Not back at baseline yet ~80%.  Mild weakness with increased activities.  Able to ambulate without assistance (but is still a fall precaution).  Tolerating diet via PEG tube.  Completed rehab.     MTX discontinued 2/18 with concern of toxicity (decrease blood counts and transaminatis).  Folic acid increased to 4mg daily.  Still on medrol 32mg daily with atorvaquone for PCP prophylaxis.  Bactrim d/c because of interaction with leucovorin.  Leucovorin 5mg daily started - Leucovorin discontinued.  Continues to be on folic acid 4mg daily    Radiation completed (28 days) completed May 8.      EGD/colonoscopy done on July 29 - normal.  PEG tube removed    Last PET scan (June 20) showed negative for metastasis.  At this time,  will monitor per oncology and repeat PET scan in Sept.  No treatment needed at this time.     At the last visit, Cellcept was increased to 500mg BID and prednisone 15mg daily.       Interval History     Patient noticed bilateral anterior thigh rash that started about 1 week ago (after increasing Cellcept from 500mg daily to BID).  Rash is erythematous and pruritic in nature.  Slight improvement in periorbital edema and strength after last IVIG (12/10-11)    Denies any dysphagia, alopecia, arthralgia, abdominal pain, CP, SOB, N/V, chills, or urinary symptoms.        Review of Systems   Constitutional: Negative for chills, diaphoresis, fever, malaise/fatigue and weight loss.   HENT: Negative for congestion, ear discharge, ear pain, hearing loss, nosebleeds, sinus pain and tinnitus.    Eyes: Negative for photophobia, pain, discharge and redness.   Respiratory: Negative for cough, hemoptysis, sputum production, shortness of breath, wheezing and stridor.    Cardiovascular: Negative for chest pain, palpitations, orthopnea, claudication, leg swelling and PND.   Gastrointestinal: Negative for abdominal pain, constipation, diarrhea, heartburn, nausea and vomiting.   Genitourinary: Negative for dysuria, frequency, hematuria and urgency.   Musculoskeletal: Positive for myalgias. Negative for back pain, joint pain and neck pain.   Skin: Positive for itching and rash.   Neurological: Positive for weakness. Negative for dizziness, tingling, tremors and headaches.   Endo/Heme/Allergies: Does not bruise/bleed easily.   Psychiatric/Behavioral: Negative for depression, hallucinations and suicidal ideas. The patient is not nervous/anxious and does not have insomnia.         Past Medical History:   Diagnosis Date    Breast cancer 01/01/2017    left    Depression     Dermatomyositis     Diverticulosis     Gastritis     Hypertension     Vitamin B12 deficiency 3/8/2018       Past Surgical History:   Procedure Laterality Date     BREAST BIOPSY Left     BREAST RECONSTRUCTION Left 2017     SECTION      COLONOSCOPY  2011    repeat in 10 yrs.    COLONOSCOPY N/A 2019    Procedure: COLONOSCOPY;  Surgeon: Pernell Rosas MD;  Location: [facility] (4TH FLR);  Service: Endoscopy;  Laterality: N/A;  Patient would like to use her PEG tube for bowel prep and then have her PEG tube removed after EGD and colonoscopy procedures while she is still sedated.    D&C      ESOPHAGOGASTRODUODENOSCOPY N/A 2019    Procedure: EGD (ESOPHAGOGASTRODUODENOSCOPY);  Surgeon: Pernell Rosas MD;  Location: [facility] (2ND FLR);  Service: Endoscopy;  Laterality: N/A;    ESOPHAGOGASTRODUODENOSCOPY N/A 2019    Procedure: EGD (ESOPHAGOGASTRODUODENOSCOPY);  Surgeon: Pernlel Rosas MD;  Location: [facility] (4TH FLR);  Service: Endoscopy;  Laterality: N/A;  EGD for follow-up of erosive esophagitis per Dr. Rosas    Patient would like to use her PEG tube for bowel prep and then have her PEG tube removed after EGD and colonoscopy procedures while she is still sedated.    INSERTION OF TUNNELED CENTRAL VENOUS CATHETER (CVC) WITH SUBCUTANEOUS PORT Right 10/31/2018    Procedure: [id number]-F-CATH;  Surgeon: Deo Palencia MD;  Location: [address];  Service: General;  Laterality: Right;    MASTECTOMY Left 2017    MYOMECTOMY      PORTACATH PLACEMENT      SENTINEL LYMPH NODE BIOPSY  2017    left    TOTAL REDUCTION MAMMOPLASTY Right 2017    UPPER GASTROINTESTINAL ENDOSCOPY          Social History     Tobacco Use    Smoking status: Never Smoker    Smokeless tobacco: Never Used   Substance Use Topics    Alcohol use: Yes     Frequency: Never     Drinks per session: Patient refused     Binge frequency: Never     Comment: rare    Drug use: No       Family History   Problem Relation Age of Onset    Heart disease Father     Hypertension Father     Breast cancer Sister 52    Hypertension Mother     No Known Problems Brother     Thyroid  "disease Daughter         hyperthyroidism s/p thyroidectomy    No Known Problems Son     No Known Problems Brother     No Known Problems Brother     No Known Problems Sister     No Known Problems Daughter     Colon cancer Neg Hx     Ovarian cancer Neg Hx     Celiac disease Neg Hx     Cirrhosis Neg Hx     Colon polyps Neg Hx     Esophageal cancer Neg Hx     Inflammatory bowel disease Neg Hx     Liver cancer Neg Hx     Liver disease Neg Hx     Rectal cancer Neg Hx     Stomach cancer Neg Hx     Ulcerative colitis Neg Hx        Review of patient's allergies indicates:  No Known Allergies        Physical examination:-    Vitals:    12/18/19 0948   BP: (!) 156/89   Pulse: 93   Weight: 86.5 kg (190 lb 11.2 oz)   Height: 5' 5" (1.651 m)   PainSc:   3       Physical Exam   Constitutional: She is oriented to person, place, and time and well-developed, well-nourished, and in no distress.   HENT:   Head: Normocephalic and atraumatic.   No oral mouth ulcers   Eyes: Pupils are equal, round, and reactive to light. Conjunctivae are normal.   Neck: Neck supple. No tracheal deviation (tpmt) present.   Cardiovascular: Normal rate, regular rhythm and normal heart sounds.   Pulmonary/Chest: Effort normal and breath sounds normal.   Abdominal: Soft. Bowel sounds are normal. Rebound: pmt.   Musculoskeletal: She exhibits edema. She exhibits no tenderness or deformity.   Right Side Rheumatological Exam      Muscle Strength (0-5 scale):  Neck Flexion:  4.5  Neck Extension: 5  Deltoid:  4/5  Biceps: 5/5   Triceps:  5  : 5/5   Iliopsoas: 4/5  Quadriceps:  4/5   Distal Lower Extremity: 5     Left Side Rheumatological Exam      Muscle Strength (0-5 scale):  Neck Flexion:  4.5  Neck Extension: 5  Deltoid:  5  Biceps: /5   Triceps:  5  :  5  Iliopsoas: 4.5/5  Quadriceps:  4.5/5   Distal Lower Extremity: 5     ROM intact  Bilateral hand swelling of MCP and PIP    Neurological: She is alert and oriented to person, place, and " time. Gait normal.   Skin: Skin is warm and dry.   Guttron's papules on hands  Bilateral helitropic rash - improvement since last clinic visit  Bilateral anterior thigh - erythematous (mild) non raised rash   Elbows - hypopigmentation    Psychiatric: Mood, memory, affect and judgment normal.             Radiographs:-  Independent visualization of images done.   CXR (1/28) - clear lungs  PET scan - no pulmonary/GI activity.       Medication List with Changes/Refills   Current Medications    AMLODIPINE (NORVASC) 5 MG TABLET    Take 2 tablets (10 mg total) by mouth once daily.    CALCIUM CARBONATE (OS-ESTELA) 600 MG CALCIUM (1,500 MG) TAB    Take 600 mg by mouth 2 (two) times daily with meals.    FERROUS SULFATE (FEOSOL) 325 MG (65 MG IRON) TAB TABLET    Take 1 tablet (325 mg total) by mouth every 12 (twelve) hours.    FOLIC ACID (FOLVITE) 1 MG TABLET    TAKE 4 TABLETS(4 MG) VIA GTUBE EVERY DAY    LEVOCETIRIZINE (XYZAL) 5 MG TABLET    Take 1 tablet (5 mg total) by mouth every evening.    MYCOPHENOLATE (CELLCEPT) 500 MG TAB    Take 1 tablet (500 mg total) by mouth 2 (two) times daily.    PREDNISONE (DELTASONE) 10 MG TABLET    Take 1 tablet (10 mg total) by mouth once daily.    PREDNISONE (DELTASONE) 5 MG TABLET    Take 1 tablet (5 mg total) by mouth once daily. Please take one 10mg and one 5mg to make 15mg every day    VITAMIN D (VITAMIN D3) 1000 UNITS TAB    Take 1,000 Units by mouth once daily.       Assessment/Plans:-   60 y.o.F with history of L sided infiltrating ductal carcinoma of the L breast (dx on Jan 2017), HTN, depression, gastritis, and recently diagnosed myositis (uncertain if it's autoimmune vs medication induced), present today for follow up because of concern about myositis flare.     Patient started on Atelizumab/Abraxane on 11/7/18.  Patient developed swelling of the face on 1/4/19 and went to urgent care and given short course of prednisone 20 mg BID. On 1/15/19, patient seen in hem/onc clinic with c/o  of pressure and tightness around neck. CT scan of chest and neck did not reveal any vascular compression. Started on prednisone 60 mg with taper. On 1/22/18 patient with c/o proximal muscle weakness. CPK found to be elevated around 4k. Patient admitted and given solumedrol 80 mg IV on 1/22/18. Last infusion of Atelizumab on 12/19/18. Last infusion of Abraxane on 1/3/19. Given Solumedrol 1g x 1 on 1/23/18. Seen by Dermatology on 1/24/18 and biopsy done of skin rash. Given distribution of rashes, there was concern for dermatomyositis. Patient started on Solumedrol 125 mg IV BID at that time.  Skin biopsy resulted consistent with dermatomyositis.     Labs: CPK and Aldolase normalized. +DARCY 1:640 speckled with negative profile.  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 quantTB: indeterminate, T-spot: negative     Spirometry: normal, normal diffusion capacity. FVC: 81%, DLCO: 114%     Patient exhibits signs of dermatomyositis (heliotropic rash and gottron's papules).   Dermatomyositis can be associated with malignancy.  MRI of LUE showed edema of the deltoid and biceps.  Exam with proximal muscle weakness: b/l deltoids 4/5, b/l iliopsoas 4.4/5. Skin biopsy from 1/24/19 revealing vacuolar interface dermatitis consistent with dermatomyositis.      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   Given +TIF1 gamma positivity, most likely dermatomyositis is from underlying malignancy.      Failed 2nd swallow eval on 2/6/19. PEG placed 2/7/2019.     Current medications:  - prednisone 15mg   - IVIG Started Jan 2019 - now (last dose Dec 10/11)  - folic acid daily 4mg    Esophageal biopsy - negative for viral  infection.  Nonspecific chronic inflammation.    EGD/Colonoscopy (July 2019) - normal. PEG tube removed     Fiboscan done Aug 2019 - showed fatty liver with no scarring/damage.,     At this time, patient failed steroid tapering (myalgia and rash).  Currently on Cellcept 500mg BID and prednisone 15mg daily. Cellcept was recently increased - uncertain if that is effective.     Recent studies demonstrated increased efficacy in IVIG when spaced out (Q2w instead of Q4w)    Patient is an intermediate metabolizer based on TPMT.  Cannot start imuran.  Labs still neutropenic and thrombocytopenic at this time - uncertain of the cause (radiation induce BM fibrosis vs medication induce?)     Patient unable to tolerate steroid tapering.  Was started on Cellcept 500mg daily at the last visit, appears to be insufficient - will be increase in dosage (mild drop in leukopenia and elevation of AST).  Consideration for Rituxan as steroid sparring agent or increase IVIG to 3 weeks.     Vaccination completed - Prevnar and Shingles (completed Aug 2019) and flu vaccination for this season (Aug 2019)     Cellcept started (Sept 2019).  Currently on 500mg daily (due to leukopenia).     Unable to increase Cellcept at this time - worsening leukopenia.  Also recent increased in Cellcept - unable to determine if it's effective (too early) at this time.     Plan:  - Continue Cellcept 500mg to BID daily   - CBC, CMP, CPK, Aldolase, ESR, CRP to be done prior to next visit  - Continue prednisone 15mg daily until follow up   - continue folic acid 4mg daily  - continue muscle strengthening exercise daily  - continue PT   - 1200 mg dietary calcium and Vitamin D3 1000 mg daily  - increase IVIG frequency - 1g/kg x 1 dose every 2 weeks   - message/call immediately if worsening muscle weakness  - Information about rituxan provided to patient.  All questions answered  - Dermatology referral - concern about rash dermatomyositis vs medication induced rash vs  psorarsis     # RTC in 4-6 weeks

## 2022-07-12 NOTE — PROGRESS NOTES
C3 nurse spoke with Sussy Costa for a [facility] post hospital discharge follow up call. The patient has a scheduled HOS appointment with MD Darrell on 7/26/2022 @ 1000.  C3 nurse offered to scheduled [facility] hospital follow-up discharge follow-up. The patient declined appointment at this time. Offer for NP @ home declined.

## 2022-07-13 ENCOUNTER — PATIENT MESSAGE (OUTPATIENT)
Dept: ENDOCRINOLOGY | Facility: CLINIC | Age: 87
End: 2022-07-13
Payer: MEDICARE

## 2022-07-14 ENCOUNTER — TELEPHONE (OUTPATIENT)
Dept: HEPATOLOGY | Facility: CLINIC | Age: 87
End: 2022-07-14
Payer: MEDICARE

## 2022-07-14 NOTE — TELEPHONE ENCOUNTER
----- Message from Kassandra Johnson sent at 7/14/2022  9:24 AM CDT -----  Contact: Jenny/[facility] [phone]  ATTN: Jenny Mckeon MD    RE: insulin aspart U-100 (NOVOLOG) 100 unit/mL (3 mL) InPn pen     3 Rx were sent. One for breakfast, one for lunch and one for dinner.  Please combine this into one Rx.    [facility] [place], [place] - [address] AT Portal to [facility]  [facility]  [facility] [address]  Phone: [phone] Fax: [phone]    Rx are on hold until you respond - Ref #[id number]    Thank You

## 2022-07-26 ENCOUNTER — OFFICE VISIT (OUTPATIENT)
Dept: INTERNAL MEDICINE | Facility: CLINIC | Age: 87
End: 2022-07-26
Payer: MEDICARE

## 2022-07-26 ENCOUNTER — OFFICE VISIT (OUTPATIENT)
Dept: ENDOCRINOLOGY | Facility: CLINIC | Age: 87
End: 2022-07-26
Payer: MEDICARE

## 2022-07-26 VITALS
WEIGHT: 95.44 LBS | RESPIRATION RATE: 16 BRPM | DIASTOLIC BLOOD PRESSURE: 62 MMHG | BODY MASS INDEX: 15.89 KG/M2 | OXYGEN SATURATION: 96 % | SYSTOLIC BLOOD PRESSURE: 100 MMHG | HEART RATE: 70 BPM

## 2022-07-26 VITALS
SYSTOLIC BLOOD PRESSURE: 100 MMHG | WEIGHT: 95 LBS | BODY MASS INDEX: 15.83 KG/M2 | HEIGHT: 65 IN | DIASTOLIC BLOOD PRESSURE: 60 MMHG | HEART RATE: 80 BPM | RESPIRATION RATE: 20 BRPM

## 2022-07-26 DIAGNOSIS — J45.901 EXACERBATION OF ASTHMA, UNSPECIFIED ASTHMA SEVERITY, UNSPECIFIED WHETHER PERSISTENT: ICD-10-CM

## 2022-07-26 DIAGNOSIS — T38.0X5A ADVERSE EFFECT OF CORTICOSTEROIDS, INITIAL ENCOUNTER: ICD-10-CM

## 2022-07-26 DIAGNOSIS — E78.2 MIXED HYPERLIPIDEMIA: ICD-10-CM

## 2022-07-26 DIAGNOSIS — Z09 HOSPITAL DISCHARGE FOLLOW-UP: Primary | ICD-10-CM

## 2022-07-26 DIAGNOSIS — E11.65 TYPE 2 DIABETES MELLITUS WITH HYPERGLYCEMIA, WITH LONG-TERM CURRENT USE OF INSULIN: ICD-10-CM

## 2022-07-26 DIAGNOSIS — Z79.4 TYPE 2 DIABETES MELLITUS WITH HYPERGLYCEMIA, WITH LONG-TERM CURRENT USE OF INSULIN: ICD-10-CM

## 2022-07-26 PROCEDURE — 99999 PR PBB SHADOW E&M-EST. PATIENT-LVL III: CPT | Mod: PBBFAC,,,

## 2022-07-26 PROCEDURE — 99214 OFFICE O/P EST MOD 30 MIN: CPT | Mod: S$PBB,,, | Performed by: INTERNAL MEDICINE

## 2022-07-26 PROCEDURE — 99999 PR PBB SHADOW E&M-EST. PATIENT-LVL III: CPT | Mod: PBBFAC,,, | Performed by: INTERNAL MEDICINE

## 2022-07-26 PROCEDURE — 99214 PR OFFICE/OUTPT VISIT, EST, LEVL IV, 30-39 MIN: ICD-10-PCS | Mod: S$PBB,,, | Performed by: INTERNAL MEDICINE

## 2022-07-26 PROCEDURE — 99213 OFFICE O/P EST LOW 20 MIN: CPT | Mod: PBBFAC,PO | Performed by: INTERNAL MEDICINE

## 2022-07-26 PROCEDURE — 99999 PR PBB SHADOW E&M-EST. PATIENT-LVL III: ICD-10-PCS | Mod: PBBFAC,,,

## 2022-07-26 PROCEDURE — 99212 PR OFFICE/OUTPT VISIT, EST, LEVL II, 10-19 MIN: ICD-10-PCS | Mod: S$PBB,,, | Performed by: INTERNAL MEDICINE

## 2022-07-26 PROCEDURE — 99213 OFFICE O/P EST LOW 20 MIN: CPT | Mod: PBBFAC,27

## 2022-07-26 PROCEDURE — 99212 OFFICE O/P EST SF 10 MIN: CPT | Mod: S$PBB,,, | Performed by: INTERNAL MEDICINE

## 2022-07-26 PROCEDURE — 99999 PR PBB SHADOW E&M-EST. PATIENT-LVL III: ICD-10-PCS | Mod: PBBFAC,,, | Performed by: INTERNAL MEDICINE

## 2022-07-26 RX ORDER — PREDNISONE 5 MG/1
5 TABLET ORAL DAILY
Qty: 5 TABLET | Refills: 0 | Status: SHIPPED | OUTPATIENT
Start: 2022-07-26 | End: 2022-07-26 | Stop reason: SDUPTHER

## 2022-07-26 RX ORDER — PREDNISONE 5 MG/1
5 TABLET ORAL DAILY
Qty: 5 TABLET | Refills: 0 | Status: SHIPPED | OUTPATIENT
Start: 2022-07-26 | End: 2022-09-07 | Stop reason: SDUPTHER

## 2022-07-26 NOTE — PROGRESS NOTES
Subjective:      Patient ID: Sussy Costa is a 86 y.o. female.    Chief Complaint:    Follow-up    History of Present Illness  This is a follow-up visit after recent hospitalization. Endocrinology was consulted for management of hyperglycemia during previous [facility] Admission. Consult was documented as follows:  Admit Date: 7/5/2022      Reason for Consult: Management of T2DM, Hyperglycemia      Surgical Procedure and Date: N/A     Diabetes diagnosis year: about 25 years ago     Home Diabetes Medications:  Novolog 6 units with breakfast and lunch, 7 units with dinner, Levemir 3 units BID      How often checking glucose at home?  4x daily    BG readings on regimen: mostly above 200  Hypoglycemia on the regimen?  No  Missed doses on regimen?  No     Diabetes Complications include:     Hyperglycemia     Complicating diabetes co morbidities:   HTN, HLD        HPI:   Patient is a 86 y.o. female with a diagnosis of hypertension, hyperlipidemia, diabetes mellitus, and asthma who presents to hospital secondary to dyspnea associated chest tightness. In the ED, patient was mildly hypertensive with systolic pressure 150s initially increased to 180s.  EKG on admission showing STEMI with sinus rhythm with ST elevations and T-wave inversions anteriorly however repeat EKG similar to baseline (12/2021) showing sinus rhythm with incomplete right bundle branch block and T-wave inversions anteriorly..  Patient is asymptomatic currently denies any dyspnea or chest tightness/pain, or pressure.  Admitted to SICU. Patient last seen by Dr. Martines at [facility] endocrine clinic for DM management on 7/5/22.             Discharge Follow-up Clinic Visit 07/26/2022      Etiology of the hyperglycemia:  known T2DM, steroid induced hyperglycemia.   MRN: [id number]  Patient Class: IP- Inpatient  Admission Date: 7/5/2022  Hospital Length of Stay: 6 days  Discharge Date and Time:  07/11/2022 2:16 PM  Attending Physician: Jenny Mckeon MD   Discharging  Provider: Jenny Mckeon MD  Primary Care Provider: EZ Casillas MD  Hospital Medicine Team: [facility] Jenny Mckeon MD      Discharge DM Regimen: (Per VANCE Quick NP).     - PRN refill of Insurance preferred diabetes testing supplies  - Continue Levemir 3 units BID.   - Increase Novolog 8 units with breakfast and lunch, and 10 units with dinner.   - Low Dose SQ Insulin Correction Scale:     150 - 200 + 1 unit     201 - 250 + 2 units     251 - 300 + 3 units     301 - 350 + 4 units      > 350   + 5 units  - Patient is to follow-up with Dr. Martines with [facility] [place]ology clinic for continued DM management and care.       Was able to fill prescription for medications and supplies: Yes. Patient has been fully compliant with home DM medications.        Missed doses?   No    # times a day testing 3-4 times daily      Log reviewed: yes - see picture     Hypoglycemic event at home? None    Typical meals at home:   Breakfast: eggs, toast, maybe sometimes waffles.   Lunch : [place]  Dinner : home cooked prepared meals.   Snacks : denies snacking  Drinks: Mostly water. Occasional soft drink.         With regards to reason for admit:  Doing better       Review of Systems   Constitutional: Positive for appetite change.   Gastrointestinal: Negative for nausea.   Endocrine: Negative for polydipsia, polyphagia and polyuria.   Endocrine: No Polyuria polydipsia nocturia or vision changes    Objective:   Physical Exam  Vitals reviewed.   Constitutional:       Appearance: Normal appearance.   Eyes:      Pupils: Pupils are equal, round, and reactive to light.   Pulmonary:      Effort: Pulmonary effort is normal.   Abdominal:      General: Abdomen is flat.      Palpations: Abdomen is soft.      Tenderness: There is no abdominal tenderness. There is no guarding.   Musculoskeletal:      Cervical back: Normal range of motion.   Skin:     General: Skin is warm.      Capillary Refill: Capillary refill takes less than 2  seconds.      Comments: Injection sites are ok. No lipo hypertrophy or atrophy noted.   Neurological:      Mental Status: She is alert and oriented to person, place, and time.   Psychiatric:         Mood and Affect: Mood normal.         Behavior: Behavior normal.       Body mass index is 15.89 kg/m².    Lab Review:   Lab Results   Component Value Date    HGBA1C 8.2 (H) 07/05/2022     Lab Results   Component Value Date    CHOL 122 07/05/2022    HDL 54 07/05/2022    LDLCALC 58.4 (L) 07/05/2022    TRIG 48 07/05/2022    CHOLHDL 44.3 07/05/2022     Lab Results   Component Value Date     07/11/2022    K 3.8 07/11/2022     07/11/2022    CO2 30 (H) 07/11/2022     (H) 07/11/2022    BUN 23 07/11/2022    CREATININE 0.7 07/11/2022    CALCIUM 8.3 (L) 07/11/2022    PROT 5.0 (L) 07/11/2022    ALBUMIN 2.5 (L) 07/11/2022    BILITOT 0.5 07/11/2022    ALKPHOS 51 (L) 07/11/2022    AST 67 (H) 07/11/2022    ALT 76 (H) 07/11/2022    ANIONGAP 9 07/11/2022    ESTGFRAFRICA >60.0 07/11/2022    EGFRNONAA >60.0 07/11/2022    TSH 2.897 07/05/2022       Assessment and Plan   Reviewed goals of therapy are to get the best control we can without hypoglycemia      Medication changes:   Increase Levemir to 5 units BID  Increase Novolog to 10 units for breakfast and lunch  Increase Novolog to 12 units for dinner.   Continue PRN insulin correction scale:     150 - 200 + 1 unit  201 - 250 + 2 units  251 - 300 + 3 units  301 - 350 + 4 units   > 350   + 5 units      Reviewed patient's current insulin regimen. Clarified proper insulin dose and timing in relation to meals, etc. Insulin injection sites and proper rotation instructed.       -Advised frequent self blood glucose monitoring.  Patient encouraged to document glucose results and bring them to every clinic visit      -Hypoglycemia precautions discussed. Instructed on precautions before driving.      -Close adherence to lifestyle changes recommended.       -Periodic follow ups for  eye evaluations, foot care and dental care suggested.    Follow-up with Dr. Martines at next scheduled appointment for long term DM management and maintenance.            Problem List Items Addressed This Visit        Cardiac/Vascular    Hyperlipidemia    Current Assessment & Plan     Managed per primary team  Condition may cause insulin resistance                 Endocrine    Type 2 diabetes mellitus with hyperglycemia, with long-term current use of insulin    Current Assessment & Plan     Please see above note.               Other    Adverse effect of corticosteroids    Current Assessment & Plan     On steroid therapy per transplant team; may elevate BG readings

## 2022-07-26 NOTE — PROGRESS NOTES
Subjective:       Patient ID: Sussy Costa is a 86 y.o. female.    Chief Complaint: Hospital Follow Up    HPI     86-year-old female here for hospital follow-up.  Discharge 07/11/2022.    Family and/or Caretaker present at visit?  No.  Diagnostic tests reviewed/disposition: I have reviewed all completed as well as pending diagnostic tests at the time of discharge.  Disease/illness education: asthma exacerbation  Home health/community services discussion/referrals: Patient does not have home health established from hospital visit.  They do not need home health.  If needed, we will set up home health for the patient.   Establishment or re-establishment of referral orders for community resources: No other necessary community resources.   Discussion with other health care providers: No discussion with other health care providers necessary.  I reviewed and reconciled the medications from hospital discharge.    She reports that she woke up this am and took a breathing treatment before she came.  Sunday and Monday, she was ok and this am, she took one.  Yesterday was the last day of steroids.  She has albuterol as needed with nebulizers.  She is on singulair 10 mg and Advair twice daily as well.  She has not had an asthma attack in over a year.    Discharge summary:  HPI:   This is an 85yo female with a h/o HTN, DIABETES MELITIS, asthma who [place] [facility] [place] sudden onset dyspnea and chest pain on day of admission.  ECG demonstrated ST elevations anteriour leads.  Cardiology admitted patient and she was given asa, Plavix, and heparin.  Repeat ECG looked improved and the patient's chest pain resolved.  She was then transferred to hospitalist service for asthma.      Hospital Course:   Echocardiogram demonstrated normal LVEF with no WMA.  Troponin normal.  Patient placed on solu medrol, bronchodilators, inhaled corticosteroids.  Remains on supplemental O2.  Repeat CXR demonstrates bibasiler opacities.   Previous specimen from bronchoscopy May 2022 grew pan-sensitive pseudomonas.    Now covering with levofloxacin. Pulmonary consulted secondary to patient's lack of improvement and continued bronchospasms and need for supplemental O2.  Patient then given treatments with nebulized saline, CPT, flutter valve.   Patient has improved since admission and is eager for discharge.  She will need to be discharged on home O2 and follow up with her pulmonologist as OP.     * Exacerbation of asthma  - bronchodilators, inhaled corticosteroids  - systemic steroids  - supplemental O2  - CXR demonstrates bibasiler opacities   - on abx therapy  - consult pulm secondary to not improving  - remains on supplemental O2  - CPT and nebulized saline     Dyspnea  - secondary to asthma exacerbation  - supplemental O2   - treat asthma exacerbation  - improving but will need home O2        Pneumonia of left lower lobe due to Pseudomonas species  - patient had bronchoscopy for left lower lobe atelectasis in May 2022.  Copious amounts of respiratory secretions were seen and collected.  Cultures grew pseudomonas.  Not treated at that time.  Now treating with levofloxacin.         Chest tightness  Initial concern for stemi but cardiac enzymes normal  - has since resolved        Hypertensive urgency  - losartan, coreg  - monitor and adjust medication regimen as needed        Adverse effect of corticosteroids  - elevated blood glucose levels  - endocrine consulted  - follow up with endocrinology as OP    Review of Systems      Objective:      Physical Exam  Vitals reviewed.   Constitutional:       Appearance: She is well-developed.   HENT:      Head: Normocephalic and atraumatic.      Mouth/Throat:      Pharynx: No oropharyngeal exudate.   Eyes:      General: No scleral icterus.        Right eye: No discharge.         Left eye: No discharge.      Pupils: Pupils are equal, round, and reactive to light.   Neck:      Thyroid: No thyromegaly.       Trachea: No tracheal deviation.   Cardiovascular:      Rate and Rhythm: Normal rate and regular rhythm.      Heart sounds: Normal heart sounds. No murmur heard.    No friction rub. No gallop.   Pulmonary:      Effort: Pulmonary effort is normal. No respiratory distress.      Breath sounds: Wheezing present. No rales.   Chest:      Chest wall: No tenderness.   Abdominal:      General: Bowel sounds are normal. There is no distension.      Palpations: Abdomen is soft. There is no mass.      Tenderness: There is no abdominal tenderness. There is no guarding or rebound.   Musculoskeletal:         General: No tenderness. Normal range of motion.      Cervical back: Normal range of motion and neck supple.   Skin:     General: Skin is warm and dry.      Coloration: Skin is not pale.      Findings: No erythema or rash.   Neurological:      Mental Status: She is alert and oriented to person, place, and time.   Psychiatric:         Behavior: Behavior normal.         Assessment:       1. Hospital discharge follow-up    2. Exacerbation of asthma, unspecified asthma severity, unspecified whether persistent      Plan:       1/2.  Continue prednisone taper to 5 mg daily for 5 days.  If patient has no relief in the next hour to from prednisone, would increase 10 mg.  If no relief by tomorrow, would contact Pulmonary.

## 2022-07-26 NOTE — TELEPHONE ENCOUNTER
----- Message from Delilah Walker sent at 7/26/2022 10:18 AM CDT -----  Contact: Pt [phone]  Requesting an RX refill or new RX.  Is this a refill or new RX: New   RX name and strength (copy/paste from chart):  predniSONE (DELTASONE) 5 MG tablet  Is this a 30 day or 90 day RX: 30  [facility] - [place] - [address] [facility] [address]  [address] [address]  Phone: [phone] Fax: [phone]

## 2022-08-11 ENCOUNTER — OFFICE VISIT (OUTPATIENT)
Dept: GASTROENTEROLOGY | Facility: CLINIC | Age: 87
End: 2022-08-11
Payer: MEDICARE

## 2022-08-11 ENCOUNTER — INFUSION (OUTPATIENT)
Dept: INFECTIOUS DISEASES | Facility: HOSPITAL | Age: 87
End: 2022-08-11
Attending: INTERNAL MEDICINE
Payer: MEDICARE

## 2022-08-11 VITALS
SYSTOLIC BLOOD PRESSURE: 186 MMHG | HEIGHT: 65 IN | DIASTOLIC BLOOD PRESSURE: 73 MMHG | BODY MASS INDEX: 18.3 KG/M2 | WEIGHT: 109.81 LBS | HEART RATE: 81 BPM

## 2022-08-11 VITALS
HEART RATE: 87 BPM | RESPIRATION RATE: 16 BRPM | DIASTOLIC BLOOD PRESSURE: 72 MMHG | SYSTOLIC BLOOD PRESSURE: 180 MMHG | TEMPERATURE: 97 F | WEIGHT: 109.88 LBS | OXYGEN SATURATION: 98 % | BODY MASS INDEX: 18.29 KG/M2

## 2022-08-11 DIAGNOSIS — K86.81 EXOCRINE PANCREATIC INSUFFICIENCY: Primary | ICD-10-CM

## 2022-08-11 DIAGNOSIS — E13.9 DIABETES 1.5, MANAGED AS TYPE 2: ICD-10-CM

## 2022-08-11 DIAGNOSIS — K86.2 PANCREAS CYST: ICD-10-CM

## 2022-08-11 DIAGNOSIS — M81.0 SENILE OSTEOPOROSIS: Primary | ICD-10-CM

## 2022-08-11 PROCEDURE — 99212 PR OFFICE/OUTPT VISIT, EST, LEVL II, 10-19 MIN: ICD-10-PCS | Mod: S$PBB,,, | Performed by: INTERNAL MEDICINE

## 2022-08-11 PROCEDURE — 96372 THER/PROPH/DIAG INJ SC/IM: CPT

## 2022-08-11 PROCEDURE — 99999 PR PBB SHADOW E&M-EST. PATIENT-LVL III: CPT | Mod: PBBFAC,,, | Performed by: INTERNAL MEDICINE

## 2022-08-11 PROCEDURE — 63600175 PHARM REV CODE 636 W HCPCS: Mod: JG | Performed by: INTERNAL MEDICINE

## 2022-08-11 PROCEDURE — 99999 PR PBB SHADOW E&M-EST. PATIENT-LVL III: ICD-10-PCS | Mod: PBBFAC,,, | Performed by: INTERNAL MEDICINE

## 2022-08-11 PROCEDURE — 99213 OFFICE O/P EST LOW 20 MIN: CPT | Mod: PBBFAC | Performed by: INTERNAL MEDICINE

## 2022-08-11 PROCEDURE — 99212 OFFICE O/P EST SF 10 MIN: CPT | Mod: S$PBB,,, | Performed by: INTERNAL MEDICINE

## 2022-08-11 RX ADMIN — DENOSUMAB 60 MG: 60 INJECTION SUBCUTANEOUS at 08:08

## 2022-08-11 NOTE — PROGRESS NOTES
Pt received prolia injection to right arm SQ, pt takes calcium and  VIT D, denies dental surgery < 3 months, pt tolerated well & left in NAD

## 2022-08-11 NOTE — PROGRESS NOTES
Subjective:       Patient ID: Sussy Costa is a 86 y.o. female.    Chief Complaint: GI Problem    HPI The patient is in follow up for her pancreas cyst and EPI. Currently on Creon 2 tablets with meals and 1 tablet with snacks. Have recent hospitalization for exacerbation of her asthma. Stated significant weight loss but started to gain the weight back since the creon. No diarrhea. Pulmonary work up so far negative for malignancy.    CT scan on 4/11/2022    Numerous pancreatic cystic lesions, increased in size and number from 04/07/2017.  For example the 1.9 cm lesion in the uncinate process on 2:82 previously measured 1.6 cm, and the 1.6 cm lesion in the pancreatic tail on 2:84 previously measured 1.0 cm.  No definite nodular enhancing components.  There is main pancreatic ductal dilatation up to 1.0 cm, new from prior.  Diffuse parenchymal atrophy.  Coarse calcifications throughout the pancreatic parenchyma, most pronounced in the pancreatic head, likely secondary to chronic pancreatitis.    Review of Systems   Constitutional: Positive for unexpected weight change.   Cardiovascular: Positive for leg swelling. Negative for chest pain.   Gastrointestinal: Negative for abdominal distention and diarrhea.         Objective:      Physical Exam  Constitutional:       General: She is not in acute distress.     Appearance: She is not ill-appearing.   Neurological:      Mental Status: She is alert.         Assessment:       Problem List Items Addressed This Visit     Pancreas cyst    Relevant Orders    CT Abdomen With Contrast    CREATININE, SERUM    Ambulatory referral/consult to Nutrition Services      Other Visit Diagnoses     Exocrine pancreatic insufficiency    -  Primary    Relevant Orders    CT Abdomen With Contrast    CREATININE, SERUM    Ambulatory referral/consult to Nutrition Services    Diabetes 1.5, managed as type 2        Relevant Orders    CT Abdomen With Contrast    CREATININE, SERUM    Ambulatory  referral/consult to Nutrition Services          Plan:       We will continue the current Creon therapy with 2 capsules with meals and 1 with snacks.  Repeat CT scan pancreas protocol in 3 months for surveillance of the pancreas cyst.  Ambulatory referral to Nutritionist for EPI and diabetes.  RTC in 6 months

## 2022-09-07 ENCOUNTER — TELEPHONE (OUTPATIENT)
Dept: PULMONOLOGY | Facility: CLINIC | Age: 87
End: 2022-09-07
Payer: MEDICARE

## 2022-09-07 DIAGNOSIS — J44.1 COPD EXACERBATION: Primary | ICD-10-CM

## 2022-09-07 RX ORDER — PREDNISONE 5 MG/1
5 TABLET ORAL DAILY
Qty: 10 TABLET | Refills: 1 | Status: SHIPPED | OUTPATIENT
Start: 2022-09-07 | End: 2022-09-22

## 2022-09-07 NOTE — TELEPHONE ENCOUNTER
I called Mrs Costa back and she says she is wheezing and coughing up a lot of phelgm. She is using her nebulizer 2-3 times daily and taking Mucinex. She said her sinuses are clear. She said Prednisone helped her in the past but she is a diabetic. I  told her Dr Peguero is on vacation until 9-20-22 and I will speak to another physician about her. Casandra Sanders LPN

## 2022-09-07 NOTE — TELEPHONE ENCOUNTER
----- Message from Geneva Hayes sent at 9/7/2022  8:08 AM CDT -----  Regarding: Medication  Contact: pt @ [phone]  Pt is calling to see if  can prescribed her something for mucus and wheezing. Asking for a call back

## 2022-09-22 RX ORDER — ATORVASTATIN CALCIUM 20 MG/1
20 TABLET, FILM COATED ORAL NIGHTLY
Qty: 90 TABLET | Refills: 0 | Status: SHIPPED | OUTPATIENT
Start: 2022-09-22 | End: 2022-12-23 | Stop reason: SDUPTHER

## 2022-09-26 DIAGNOSIS — J45.41 MODERATE PERSISTENT ASTHMA WITH EXACERBATION: ICD-10-CM

## 2022-09-26 DIAGNOSIS — J18.9 PNEUMONIA OF LEFT LOWER LOBE DUE TO INFECTIOUS ORGANISM: Primary | ICD-10-CM

## 2022-09-28 RX ORDER — INSULIN ASPART 100 [IU]/ML
INJECTION, SOLUTION INTRAVENOUS; SUBCUTANEOUS
Qty: 36 ML | Refills: 3 | Status: SHIPPED | OUTPATIENT
Start: 2022-09-28 | End: 2023-04-27 | Stop reason: SDUPTHER

## 2022-10-03 DIAGNOSIS — Z71.89 COMPLEX CARE COORDINATION: ICD-10-CM

## 2022-10-18 ENCOUNTER — OFFICE VISIT (OUTPATIENT)
Dept: PULMONOLOGY | Facility: CLINIC | Age: 87
End: 2022-10-18
Payer: MEDICARE

## 2022-10-18 ENCOUNTER — HOSPITAL ENCOUNTER (OUTPATIENT)
Dept: RADIOLOGY | Facility: HOSPITAL | Age: 87
Discharge: HOME | End: 2022-10-18
Attending: INTERNAL MEDICINE
Payer: MEDICARE

## 2022-10-18 VITALS
HEIGHT: 65 IN | DIASTOLIC BLOOD PRESSURE: 74 MMHG | SYSTOLIC BLOOD PRESSURE: 118 MMHG | WEIGHT: 104.5 LBS | BODY MASS INDEX: 17.41 KG/M2 | OXYGEN SATURATION: 94 % | HEART RATE: 89 BPM

## 2022-10-18 DIAGNOSIS — R05.9 COUGH, UNSPECIFIED TYPE: Primary | ICD-10-CM

## 2022-10-18 DIAGNOSIS — J18.9 PNEUMONIA OF LEFT LOWER LOBE DUE TO INFECTIOUS ORGANISM: ICD-10-CM

## 2022-10-18 DIAGNOSIS — J44.1 COPD EXACERBATION: ICD-10-CM

## 2022-10-18 DIAGNOSIS — J45.41 MODERATE PERSISTENT ASTHMA WITH EXACERBATION: ICD-10-CM

## 2022-10-18 PROCEDURE — 99213 OFFICE O/P EST LOW 20 MIN: CPT | Mod: PBBFAC,25 | Performed by: INTERNAL MEDICINE

## 2022-10-18 PROCEDURE — 71046 XR CHEST PA AND LATERAL: ICD-10-PCS | Mod: 26,,, | Performed by: RADIOLOGY

## 2022-10-18 PROCEDURE — 99214 PR OFFICE/OUTPT VISIT, EST, LEVL IV, 30-39 MIN: ICD-10-PCS | Mod: S$PBB,,, | Performed by: INTERNAL MEDICINE

## 2022-10-18 PROCEDURE — 71046 X-RAY EXAM CHEST 2 VIEWS: CPT | Mod: TC,FY

## 2022-10-18 PROCEDURE — 99214 OFFICE O/P EST MOD 30 MIN: CPT | Mod: S$PBB,,, | Performed by: INTERNAL MEDICINE

## 2022-10-18 PROCEDURE — 99999 PR PBB SHADOW E&M-EST. PATIENT-LVL III: ICD-10-PCS | Mod: PBBFAC,,, | Performed by: INTERNAL MEDICINE

## 2022-10-18 PROCEDURE — 99999 PR PBB SHADOW E&M-EST. PATIENT-LVL III: CPT | Mod: PBBFAC,,, | Performed by: INTERNAL MEDICINE

## 2022-10-18 PROCEDURE — 71046 X-RAY EXAM CHEST 2 VIEWS: CPT | Mod: 26,,, | Performed by: RADIOLOGY

## 2022-10-18 NOTE — PROGRESS NOTES
Subjective:      Patient ID: Sussy Costa is a 87 y.o. female.    Chief Complaint: Pneumonia    HPI  86 yo female with a persistent cough.She had a bronchoscope 5/13 by Dr. Cobb 5/13 for assessment of partial left lower lobe collapse. No mass found ONLY tenacious mucus. This was sucked out and she was fine. Today she says that she has a cough which seem to be worse after eating and on her film she has partial collapse of her right middle lobe. On ausculation she has some rhonchi over that area. I feel that she once again has partial collapse,I do not think that she is aspirating.  Will try to hydrate  use her nebulizer and give a ,medrol; 4  marcus  is a diabetic so I have to be careful with her steroids. and see back in two weeks. Hoping she clear the obstruction on her own.  No flowsheet data found.  Review of Systems   Constitutional: Negative.    HENT: Negative.     Eyes: Negative.    Respiratory:  Positive for cough.         Hx of partial collapse of the left lower lobe in May that required a bronchoscope. Lobe re inflated    Today has partial collapse of the right middle lobe.!!   Cardiovascular: Negative.    Genitourinary: Negative.    Endocrine: Diabetic    Musculoskeletal: Negative.    Skin: Negative.    Gastrointestinal: Negative.    Neurological: Negative.    Psychiatric/Behavioral: Negative.     Objective:     Physical Exam  Vitals and nursing note reviewed.   Constitutional:       General: She is not in acute distress.     Appearance: She is well-developed.   HENT:      Head: Normocephalic and atraumatic.      Right Ear: External ear normal.      Left Ear: External ear normal.      Nose: Nose normal.   Eyes:      Conjunctiva/sclera: Conjunctivae normal.      Pupils: Pupils are equal, round, and reactive to light.   Neck:      Thyroid: No thyromegaly.      Vascular: No JVD.   Cardiovascular:      Rate and Rhythm: Normal rate and regular rhythm.      Heart sounds: Normal heart sounds. No murmur  heard.    No gallop.   Pulmonary:      Effort: No respiratory distress.      Breath sounds: Normal breath sounds. No stridor. No wheezing or rales.      Comments: Coarse rhonchi over the distribution of the right middle lobe.    Sa02: 94%  Chest:      Chest wall: No tenderness.   Abdominal:      General: Bowel sounds are normal. There is no distension.      Palpations: Abdomen is soft. There is no mass.      Tenderness: There is no abdominal tenderness. There is no guarding or rebound.   Musculoskeletal:         General: Normal range of motion.      Cervical back: Normal range of motion and neck supple.   Lymphadenopathy:      Cervical: No cervical adenopathy.   Skin:     General: Skin is warm and dry.      Findings: No rash.   Neurological:      Mental Status: She is alert and oriented to person, place, and time.      Cranial Nerves: No cranial nerve deficit.      Deep Tendon Reflexes: Reflexes are normal and symmetric. Reflexes normal.   Psychiatric:         Behavior: Behavior normal.         Thought Content: Thought content normal.         Judgment: Judgment normal.       Assessment:     1. Cough, unspecified type    2. COPD exacerbation      Outpatient Encounter Medications as of 10/18/2022   Medication Sig Dispense Refill    ACCU-CHEK GUIDE TEST STRIPS Strp TEST FOUR TIMES DAILY WITH MEALS AND NIGHTLY 200 strip 11    albuterol (PROAIR HFA) 90 mcg/actuation inhaler Inhale 2 puffs into the lungs every 6 (six) hours as needed for Wheezing. Rescue 18 g 3    albuterol (PROVENTIL) 2.5 mg /3 mL (0.083 %) nebulizer solution Take 3 mLs (2.5 mg total) by nebulization every 6 (six) hours as needed for Wheezing or Shortness of Breath. Rescue 60 each 11    atorvastatin (LIPITOR) 20 MG tablet Take 1 tablet (20 mg total) by mouth every evening. 90 tablet 0    blood sugar diagnostic Strp 1 strip by Misc.(Non-Drug; Combo Route) route 4 (four) times daily with meals and nightly. 200 strip 11    calcium carbonate (OS-JUNIE) 600 mg  calcium (1,500 mg) Tab Take 600 mg by mouth once.      carvediloL (COREG) 12.5 MG tablet Take 1 tablet (12.5 mg total) by mouth 2 (two) times daily with meals. 60 tablet 11    fluticasone-salmeterol diskus inhaler 500-50 mcg Inhale 1 puff into the lungs 2 (two) times daily. Controller 180 each 3    furosemide (LASIX) 20 MG tablet Take 1 tablet (20 mg total) by mouth once daily. 30 tablet 0    glucagon (GVOKE HYPOPEN 2-PACK) 1 mg/0.2 mL AtIn Inject 1 mg into the skin as needed (severe hypoglycemia). 1 each 1    insulin aspart U-100 (NOVOLOG FLEXPEN U-100 INSULIN) 100 unit/mL (3 mL) InPn pen Take 10 units with breakfast and lunch, and 12 units with dinner, plus correction scale, max TDD 40 units 36 mL 3    insulin detemir U-100 (LEVEMIR FLEXTOUCH U-100 INSULN) 100 unit/mL (3 mL) InPn pen Inject 3 Units into the skin 2 (two) times daily. 15 mL 11    irbesartan (AVAPRO) 300 MG tablet TAKE 1 TABLET BY MOUTH EVERY EVENING 90 tablet 3    lancets (ACCU-CHEK FASTCLIX LANCET DRUM) [facility] CHECK BLOOD GLUCOSE FOUR TIMES DAILY 200 each 3    latanoprost 0.005 % ophthalmic solution Inject into the eye. 1 Drops Ophthalmic Every evening      levocetirizine (XYZAL) 5 MG tablet Take 1 tablet (5 mg total) by mouth every evening. 90 tablet 3    lipase-protease-amylase 24,000-76,000-120,000 units (CREON) 24,000-76,000 -120,000 unit capsule Take 2 capsules with meals orally and 1 capsule with snacks. 270 capsule 11    magnesium oxide (MAG-OX) 400 mg (241.3 mg magnesium) tablet Take 400 mg by mouth once daily.      methylPREDNISolone (MEDROL DOSEPACK) 4 mg tablet use as directed 21 each 1    montelukast (SINGULAIR) 10 mg tablet TAKE 1 TABLET BY MOUTH EVERY EVENING 30 tablet 6    neomycin-polymyxin-hydrocortisone (CORTISPORIN) 3.5-10,000-1 mg/mL-unit/mL-% otic suspension Use bid for nails 10 mL 6    olopatadine (PATANOL) 0.1 % ophthalmic solution Place 1 drop into both eyes 2 (two) times daily as needed. 1 Drops Ophthalmic Twice a day        "pen needle, diabetic 31 gauge x 5/16" Ndle Inject 1 each into the skin 4 (four) times daily with meals and nightly. Use with insulin pen 200 each 11    vitamin D (VITAMIN D3) 1000 units Tab Take 1,000 Units by mouth once daily.      [DISCONTINUED] blood sugar diagnostic Strp 1 strip by Misc.(Non-Drug; Combo Route) route 4 (four) times daily with meals and nightly. 200 strip 11    [DISCONTINUED] predniSONE (DELTASONE) 5 MG tablet TAKE 1 TABLET BY MOUTH EVERY DAY 10 tablet 1    [DISCONTINUED] risedronate (ACTONEL) 35 MG tablet Take 1 tablet (35 mg total) by mouth every 7 days. 12 tablet 3     No facility-administered encounter medications on file as of 10/18/2022.       Plan:     Problem List Items Addressed This Visit    None  Visit Diagnoses       Cough, unspecified type    -  Primary    COPD exacerbation              Hope with increased hydration and medrol with her bronchodilators she will clear the right middle lobe.     "

## 2022-10-19 ENCOUNTER — TELEPHONE (OUTPATIENT)
Dept: PULMONOLOGY | Facility: CLINIC | Age: 87
End: 2022-10-19
Payer: MEDICARE

## 2022-10-19 RX ORDER — METHYLPREDNISOLONE 4 MG/1
TABLET ORAL
Qty: 21 EACH | Refills: 1 | Status: ON HOLD | OUTPATIENT
Start: 2022-10-19 | End: 2022-11-02 | Stop reason: HOSPADM

## 2022-10-19 NOTE — TELEPHONE ENCOUNTER
----- Message from Kristie Vivar sent at 10/19/2022  1:26 PM CDT -----  Regarding: Returning Call  Contact: pt @ [phone]  Pt is requesting a call back from Casandra regarding Medication   RX :predniSONE (DELTASONE) 5 MG tablet please call to discuss further .

## 2022-10-19 NOTE — TELEPHONE ENCOUNTER
I called Mrs Costa back and let her know that Dr Peguero sent a Medrol dosepak to [facility] pharmacy rather than the 5mg Prednisone.  Morisholden said Dr Peguero told her to return in November and will I make that appointment for her? I told Mrs Costa I will mail it to her home. Casandra Sanders LPN

## 2022-10-24 LAB
BASOPHILS # BLD AUTO: 0.01 K/UL (ref 0–0.2)
BASOPHILS NFR BLD: 0.1 % (ref 0–1.9)
DIFFERENTIAL METHOD: ABNORMAL
EOSINOPHIL # BLD AUTO: 0 K/UL (ref 0–0.5)
EOSINOPHIL NFR BLD: 0.1 % (ref 0–8)
ERYTHROCYTE [DISTWIDTH] IN BLOOD BY AUTOMATED COUNT: 13.7 % (ref 11.5–14.5)
HCT VFR BLD AUTO: 40.9 % (ref 37–48.5)
HGB BLD-MCNC: 12.7 G/DL (ref 12–16)
IMM GRANULOCYTES # BLD AUTO: 0.04 K/UL (ref 0–0.04)
IMM GRANULOCYTES NFR BLD AUTO: 0.4 % (ref 0–0.5)
LYMPHOCYTES # BLD AUTO: 2 K/UL (ref 1–4.8)
LYMPHOCYTES NFR BLD: 18.5 % (ref 18–48)
MCH RBC QN AUTO: 29.1 PG (ref 27–31)
MCHC RBC AUTO-ENTMCNC: 31.1 G/DL (ref 32–36)
MCV RBC AUTO: 94 FL (ref 82–98)
MONOCYTES # BLD AUTO: 0.7 K/UL (ref 0.3–1)
MONOCYTES NFR BLD: 6.7 % (ref 4–15)
NEUTROPHILS # BLD AUTO: 8 K/UL (ref 1.8–7.7)
NEUTROPHILS NFR BLD: 74.2 % (ref 38–73)
NRBC BLD-RTO: 0 /100 WBC
PLATELET # BLD AUTO: 302 K/UL (ref 150–450)
PMV BLD AUTO: 11.1 FL (ref 9.2–12.9)
POCT GLUCOSE: 211 MG/DL (ref 70–110)
RBC # BLD AUTO: 4.37 M/UL (ref 4–5.4)
WBC # BLD AUTO: 10.77 K/UL (ref 3.9–12.7)

## 2022-10-24 PROCEDURE — 99285 EMERGENCY DEPT VISIT HI MDM: CPT | Mod: GC,,, | Performed by: STUDENT IN AN ORGANIZED HEALTH CARE EDUCATION/TRAINING PROGRAM

## 2022-10-24 PROCEDURE — 85025 COMPLETE CBC W/AUTO DIFF WBC: CPT | Performed by: STUDENT IN AN ORGANIZED HEALTH CARE EDUCATION/TRAINING PROGRAM

## 2022-10-24 PROCEDURE — 99285 EMERGENCY DEPT VISIT HI MDM: CPT | Mod: 25

## 2022-10-24 PROCEDURE — 82962 GLUCOSE BLOOD TEST: CPT

## 2022-10-24 PROCEDURE — 99285 PR EMERGENCY DEPT VISIT,LEVEL V: ICD-10-PCS | Mod: GC,,, | Performed by: STUDENT IN AN ORGANIZED HEALTH CARE EDUCATION/TRAINING PROGRAM

## 2022-10-24 PROCEDURE — 80053 COMPREHEN METABOLIC PANEL: CPT | Performed by: STUDENT IN AN ORGANIZED HEALTH CARE EDUCATION/TRAINING PROGRAM

## 2022-10-24 PROCEDURE — 96374 THER/PROPH/DIAG INJ IV PUSH: CPT

## 2022-10-24 PROCEDURE — 96376 TX/PRO/DX INJ SAME DRUG ADON: CPT

## 2022-10-24 PROCEDURE — 81003 URINALYSIS AUTO W/O SCOPE: CPT | Performed by: STUDENT IN AN ORGANIZED HEALTH CARE EDUCATION/TRAINING PROGRAM

## 2022-10-24 PROCEDURE — 83690 ASSAY OF LIPASE: CPT | Performed by: STUDENT IN AN ORGANIZED HEALTH CARE EDUCATION/TRAINING PROGRAM

## 2022-10-24 PROCEDURE — 96375 TX/PRO/DX INJ NEW DRUG ADDON: CPT

## 2022-10-24 PROCEDURE — 93005 ELECTROCARDIOGRAM TRACING: CPT

## 2022-10-24 PROCEDURE — 93010 EKG 12-LEAD: ICD-10-PCS | Mod: ,,, | Performed by: INTERNAL MEDICINE

## 2022-10-24 PROCEDURE — 82378 CARCINOEMBRYONIC ANTIGEN: CPT | Performed by: STUDENT IN AN ORGANIZED HEALTH CARE EDUCATION/TRAINING PROGRAM

## 2022-10-24 PROCEDURE — 93010 ELECTROCARDIOGRAM REPORT: CPT | Mod: ,,, | Performed by: INTERNAL MEDICINE

## 2022-10-25 ENCOUNTER — ANESTHESIA EVENT (OUTPATIENT)
Dept: SURGERY | Facility: HOSPITAL | Age: 87
DRG: 329 | End: 2022-10-25
Payer: MEDICARE

## 2022-10-25 ENCOUNTER — HOSPITAL ENCOUNTER (INPATIENT)
Facility: HOSPITAL | Age: 87
LOS: 8 days | Discharge: NURSING, REHAB OR LONG-TERM CARE | DRG: 329 | End: 2022-11-02
Attending: STUDENT IN AN ORGANIZED HEALTH CARE EDUCATION/TRAINING PROGRAM | Admitting: COLON & RECTAL SURGERY
Payer: MEDICARE

## 2022-10-25 ENCOUNTER — ANESTHESIA (OUTPATIENT)
Dept: SURGERY | Facility: HOSPITAL | Age: 87
DRG: 329 | End: 2022-10-25
Payer: MEDICARE

## 2022-10-25 DIAGNOSIS — I49.9 ARRHYTHMIA: ICD-10-CM

## 2022-10-25 DIAGNOSIS — K56.609 LARGE BOWEL OBSTRUCTION: Primary | ICD-10-CM

## 2022-10-25 DIAGNOSIS — Z74.09 IMPAIRED MOBILITY: ICD-10-CM

## 2022-10-25 DIAGNOSIS — Z46.59 ENCOUNTER FOR NASOGASTRIC (NG) TUBE PLACEMENT: ICD-10-CM

## 2022-10-25 DIAGNOSIS — K63.89 COLONIC MASS: ICD-10-CM

## 2022-10-25 DIAGNOSIS — J98.11 ATELECTASIS: ICD-10-CM

## 2022-10-25 DIAGNOSIS — R10.9 ABDOMINAL CRAMPING: ICD-10-CM

## 2022-10-25 DIAGNOSIS — I48.91 ATRIAL FIBRILLATION: ICD-10-CM

## 2022-10-25 LAB
ABO + RH BLD: NORMAL
ALBUMIN SERPL BCP-MCNC: 3.3 G/DL (ref 3.5–5.2)
ALP SERPL-CCNC: 48 U/L (ref 55–135)
ALT SERPL W/O P-5'-P-CCNC: 60 U/L (ref 10–44)
ANION GAP SERPL CALC-SCNC: 9 MMOL/L (ref 8–16)
AST SERPL-CCNC: 56 U/L (ref 10–40)
BILIRUB SERPL-MCNC: 0.4 MG/DL (ref 0.1–1)
BILIRUB UR QL STRIP: NEGATIVE
BLD GP AB SCN CELLS X3 SERPL QL: NORMAL
BUN SERPL-MCNC: 21 MG/DL (ref 8–23)
CALCIUM SERPL-MCNC: 9 MG/DL (ref 8.7–10.5)
CEA SERPL-MCNC: 4.7 NG/ML (ref 0–5)
CHLORIDE SERPL-SCNC: 102 MMOL/L (ref 95–110)
CLARITY UR REFRACT.AUTO: CLEAR
CO2 SERPL-SCNC: 24 MMOL/L (ref 23–29)
COLOR UR AUTO: ABNORMAL
CREAT SERPL-MCNC: 0.7 MG/DL (ref 0.5–1.4)
EST. GFR  (NO RACE VARIABLE): >60 ML/MIN/1.73 M^2
GLUCOSE SERPL-MCNC: 182 MG/DL (ref 70–110)
GLUCOSE UR QL STRIP: ABNORMAL
HGB UR QL STRIP: NEGATIVE
KETONES UR QL STRIP: NEGATIVE
LEUKOCYTE ESTERASE UR QL STRIP: NEGATIVE
LIPASE SERPL-CCNC: 4 U/L (ref 4–60)
NITRITE UR QL STRIP: NEGATIVE
PH UR STRIP: 6 [PH] (ref 5–8)
POCT GLUCOSE: 136 MG/DL (ref 70–110)
POCT GLUCOSE: 206 MG/DL (ref 70–110)
POCT GLUCOSE: 316 MG/DL (ref 70–110)
POCT GLUCOSE: 369 MG/DL (ref 70–110)
POTASSIUM SERPL-SCNC: 3.7 MMOL/L (ref 3.5–5.1)
PROT SERPL-MCNC: 6.3 G/DL (ref 6–8.4)
PROT UR QL STRIP: NEGATIVE
SODIUM SERPL-SCNC: 135 MMOL/L (ref 136–145)
SP GR UR STRIP: 1.01 (ref 1–1.03)
URN SPEC COLLECT METH UR: ABNORMAL

## 2022-10-25 PROCEDURE — 94761 N-INVAS EAR/PLS OXIMETRY MLT: CPT

## 2022-10-25 PROCEDURE — 63600175 PHARM REV CODE 636 W HCPCS: Performed by: STUDENT IN AN ORGANIZED HEALTH CARE EDUCATION/TRAINING PROGRAM

## 2022-10-25 PROCEDURE — 37000008 HC ANESTHESIA 1ST 15 MINUTES: Performed by: COLON & RECTAL SURGERY

## 2022-10-25 PROCEDURE — 36620 INSERTION CATHETER ARTERY: CPT | Mod: 59,,, | Performed by: ANESTHESIOLOGY

## 2022-10-25 PROCEDURE — 88342 CHG IMMUNOCYTOCHEMISTRY: ICD-10-PCS | Mod: 26,,, | Performed by: PATHOLOGY

## 2022-10-25 PROCEDURE — 27201037 HC PRESSURE MONITORING SET UP

## 2022-10-25 PROCEDURE — 36620 INSERTION CATHETER ARTERY: CPT | Performed by: ANESTHESIOLOGY

## 2022-10-25 PROCEDURE — D9220A PRA ANESTHESIA: ICD-10-PCS | Mod: CRNA,,, | Performed by: REGISTERED NURSE

## 2022-10-25 PROCEDURE — 88309 PR  SURG PATH,LEVEL VI: ICD-10-PCS | Mod: 26,,, | Performed by: PATHOLOGY

## 2022-10-25 PROCEDURE — 63600175 PHARM REV CODE 636 W HCPCS: Performed by: REGISTERED NURSE

## 2022-10-25 PROCEDURE — 25000242 PHARM REV CODE 250 ALT 637 W/ HCPCS: Performed by: STUDENT IN AN ORGANIZED HEALTH CARE EDUCATION/TRAINING PROGRAM

## 2022-10-25 PROCEDURE — 44140 PR PART REMOVAL COLON W ANASTOMOSIS: ICD-10-PCS | Mod: ,,, | Performed by: COLON & RECTAL SURGERY

## 2022-10-25 PROCEDURE — 88341 IMHCHEM/IMCYTCHM EA ADD ANTB: CPT | Mod: 26,,, | Performed by: PATHOLOGY

## 2022-10-25 PROCEDURE — D9220A PRA ANESTHESIA: Mod: CRNA,,, | Performed by: REGISTERED NURSE

## 2022-10-25 PROCEDURE — 27201423 OPTIME MED/SURG SUP & DEVICES STERILE SUPPLY: Performed by: COLON & RECTAL SURGERY

## 2022-10-25 PROCEDURE — 63600175 PHARM REV CODE 636 W HCPCS

## 2022-10-25 PROCEDURE — 36620 PR INSERT CATH,ART,PERCUT,SHORTTERM: ICD-10-PCS | Mod: 59,,, | Performed by: ANESTHESIOLOGY

## 2022-10-25 PROCEDURE — 36000708 HC OR TIME LEV III 1ST 15 MIN: Performed by: COLON & RECTAL SURGERY

## 2022-10-25 PROCEDURE — D9220A PRA ANESTHESIA: Mod: ANES,,, | Performed by: ANESTHESIOLOGY

## 2022-10-25 PROCEDURE — 99900035 HC TECH TIME PER 15 MIN (STAT)

## 2022-10-25 PROCEDURE — S0030 INJECTION, METRONIDAZOLE: HCPCS | Performed by: REGISTERED NURSE

## 2022-10-25 PROCEDURE — 88342 IMHCHEM/IMCYTCHM 1ST ANTB: CPT | Performed by: PATHOLOGY

## 2022-10-25 PROCEDURE — S5010 5% DEXTROSE AND 0.45% SALINE: HCPCS | Performed by: STUDENT IN AN ORGANIZED HEALTH CARE EDUCATION/TRAINING PROGRAM

## 2022-10-25 PROCEDURE — 88342 IMHCHEM/IMCYTCHM 1ST ANTB: CPT | Mod: 26,,, | Performed by: PATHOLOGY

## 2022-10-25 PROCEDURE — 25000003 PHARM REV CODE 250: Performed by: STUDENT IN AN ORGANIZED HEALTH CARE EDUCATION/TRAINING PROGRAM

## 2022-10-25 PROCEDURE — 63600175 PHARM REV CODE 636 W HCPCS: Performed by: ANESTHESIOLOGY

## 2022-10-25 PROCEDURE — 82962 GLUCOSE BLOOD TEST: CPT | Performed by: COLON & RECTAL SURGERY

## 2022-10-25 PROCEDURE — 94667 MNPJ CHEST WALL 1ST: CPT

## 2022-10-25 PROCEDURE — 86850 RBC ANTIBODY SCREEN: CPT | Performed by: COLON & RECTAL SURGERY

## 2022-10-25 PROCEDURE — 99223 1ST HOSP IP/OBS HIGH 75: CPT | Mod: AI,,, | Performed by: COLON & RECTAL SURGERY

## 2022-10-25 PROCEDURE — 27000221 HC OXYGEN, UP TO 24 HOURS

## 2022-10-25 PROCEDURE — 25000003 PHARM REV CODE 250: Performed by: REGISTERED NURSE

## 2022-10-25 PROCEDURE — 44140 PARTIAL REMOVAL OF COLON: CPT | Mod: ,,, | Performed by: COLON & RECTAL SURGERY

## 2022-10-25 PROCEDURE — 71000015 HC POSTOP RECOV 1ST HR: Performed by: COLON & RECTAL SURGERY

## 2022-10-25 PROCEDURE — 20600001 HC STEP DOWN PRIVATE ROOM

## 2022-10-25 PROCEDURE — 25500020 PHARM REV CODE 255: Performed by: STUDENT IN AN ORGANIZED HEALTH CARE EDUCATION/TRAINING PROGRAM

## 2022-10-25 PROCEDURE — 94640 AIRWAY INHALATION TREATMENT: CPT

## 2022-10-25 PROCEDURE — 36000709 HC OR TIME LEV III EA ADD 15 MIN: Performed by: COLON & RECTAL SURGERY

## 2022-10-25 PROCEDURE — 71000016 HC POSTOP RECOV ADDL HR: Performed by: COLON & RECTAL SURGERY

## 2022-10-25 PROCEDURE — 88341 PR IHC OR ICC EACH ADD'L SINGLE ANTIBODY  STAINPR: ICD-10-PCS | Mod: 26,,, | Performed by: PATHOLOGY

## 2022-10-25 PROCEDURE — 37000009 HC ANESTHESIA EA ADD 15 MINS: Performed by: COLON & RECTAL SURGERY

## 2022-10-25 PROCEDURE — D9220A PRA ANESTHESIA: ICD-10-PCS | Mod: ANES,,, | Performed by: ANESTHESIOLOGY

## 2022-10-25 PROCEDURE — 71000033 HC RECOVERY, INTIAL HOUR: Performed by: COLON & RECTAL SURGERY

## 2022-10-25 PROCEDURE — 88309 TISSUE EXAM BY PATHOLOGIST: CPT | Mod: 26,,, | Performed by: PATHOLOGY

## 2022-10-25 PROCEDURE — 88309 TISSUE EXAM BY PATHOLOGIST: CPT | Performed by: PATHOLOGY

## 2022-10-25 PROCEDURE — 99223 PR INITIAL HOSPITAL CARE,LEVL III: ICD-10-PCS | Mod: AI,,, | Performed by: COLON & RECTAL SURGERY

## 2022-10-25 PROCEDURE — 25000003 PHARM REV CODE 250: Performed by: COLON & RECTAL SURGERY

## 2022-10-25 PROCEDURE — 88341 IMHCHEM/IMCYTCHM EA ADD ANTB: CPT | Mod: 59 | Performed by: PATHOLOGY

## 2022-10-25 RX ORDER — PROCHLORPERAZINE EDISYLATE 5 MG/ML
5 INJECTION INTRAMUSCULAR; INTRAVENOUS EVERY 30 MIN PRN
Status: DISCONTINUED | OUTPATIENT
Start: 2022-10-25 | End: 2022-10-25 | Stop reason: HOSPADM

## 2022-10-25 RX ORDER — LATANOPROST 50 UG/ML
1 SOLUTION/ DROPS OPHTHALMIC NIGHTLY
Status: DISCONTINUED | OUTPATIENT
Start: 2022-10-25 | End: 2022-11-02 | Stop reason: HOSPADM

## 2022-10-25 RX ORDER — IPRATROPIUM BROMIDE AND ALBUTEROL SULFATE 2.5; .5 MG/3ML; MG/3ML
3 SOLUTION RESPIRATORY (INHALATION)
Status: COMPLETED | OUTPATIENT
Start: 2022-10-25 | End: 2022-10-25

## 2022-10-25 RX ORDER — HYDRALAZINE HYDROCHLORIDE 20 MG/ML
10 INJECTION INTRAMUSCULAR; INTRAVENOUS EVERY 8 HOURS PRN
Status: DISCONTINUED | OUTPATIENT
Start: 2022-10-25 | End: 2022-10-27

## 2022-10-25 RX ORDER — PHENYLEPHRINE HYDROCHLORIDE 10 MG/ML
INJECTION INTRAVENOUS
Status: DISCONTINUED | OUTPATIENT
Start: 2022-10-25 | End: 2022-10-25

## 2022-10-25 RX ORDER — LIDOCAINE HYDROCHLORIDE 20 MG/ML
INJECTION INTRAVENOUS
Status: DISCONTINUED | OUTPATIENT
Start: 2022-10-25 | End: 2022-10-25

## 2022-10-25 RX ORDER — METOPROLOL TARTRATE 1 MG/ML
5 INJECTION, SOLUTION INTRAVENOUS EVERY 6 HOURS
Status: DISCONTINUED | OUTPATIENT
Start: 2022-10-25 | End: 2022-10-27

## 2022-10-25 RX ORDER — EPHEDRINE SULFATE 50 MG/ML
INJECTION, SOLUTION INTRAVENOUS
Status: DISCONTINUED | OUTPATIENT
Start: 2022-10-25 | End: 2022-10-25

## 2022-10-25 RX ORDER — DEXMEDETOMIDINE HYDROCHLORIDE 100 UG/ML
INJECTION, SOLUTION INTRAVENOUS
Status: DISCONTINUED | OUTPATIENT
Start: 2022-10-25 | End: 2022-10-25

## 2022-10-25 RX ORDER — ONDANSETRON 2 MG/ML
4 INJECTION INTRAMUSCULAR; INTRAVENOUS
Status: COMPLETED | OUTPATIENT
Start: 2022-10-25 | End: 2022-10-25

## 2022-10-25 RX ORDER — ONDANSETRON 2 MG/ML
INJECTION INTRAMUSCULAR; INTRAVENOUS
Status: DISCONTINUED | OUTPATIENT
Start: 2022-10-25 | End: 2022-10-25

## 2022-10-25 RX ORDER — MONTELUKAST SODIUM 10 MG/1
10 TABLET ORAL NIGHTLY
Status: DISCONTINUED | OUTPATIENT
Start: 2022-10-25 | End: 2022-11-02 | Stop reason: HOSPADM

## 2022-10-25 RX ORDER — BUPIVACAINE HYDROCHLORIDE 2.5 MG/ML
INJECTION, SOLUTION EPIDURAL; INFILTRATION; INTRACAUDAL
Status: DISCONTINUED | OUTPATIENT
Start: 2022-10-25 | End: 2022-10-25 | Stop reason: HOSPADM

## 2022-10-25 RX ORDER — GLUCAGON 1 MG
1 KIT INJECTION
Status: DISCONTINUED | OUTPATIENT
Start: 2022-10-25 | End: 2022-11-02 | Stop reason: HOSPADM

## 2022-10-25 RX ORDER — INSULIN ASPART 100 [IU]/ML
0-5 INJECTION, SOLUTION INTRAVENOUS; SUBCUTANEOUS EVERY 6 HOURS PRN
Status: DISCONTINUED | OUTPATIENT
Start: 2022-10-25 | End: 2022-11-02 | Stop reason: HOSPADM

## 2022-10-25 RX ORDER — SODIUM CHLORIDE 0.9 % (FLUSH) 0.9 %
10 SYRINGE (ML) INJECTION
Status: DISCONTINUED | OUTPATIENT
Start: 2022-10-25 | End: 2022-11-02 | Stop reason: HOSPADM

## 2022-10-25 RX ORDER — KETAMINE HCL IN 0.9 % NACL 50 MG/5 ML
SYRINGE (ML) INTRAVENOUS
Status: DISCONTINUED | OUTPATIENT
Start: 2022-10-25 | End: 2022-10-25

## 2022-10-25 RX ORDER — DEXTROSE MONOHYDRATE AND SODIUM CHLORIDE 5; .45 G/100ML; G/100ML
INJECTION, SOLUTION INTRAVENOUS CONTINUOUS
Status: DISCONTINUED | OUTPATIENT
Start: 2022-10-25 | End: 2022-10-26

## 2022-10-25 RX ORDER — ONDANSETRON 2 MG/ML
4 INJECTION INTRAMUSCULAR; INTRAVENOUS EVERY 4 HOURS PRN
Status: DISCONTINUED | OUTPATIENT
Start: 2022-10-25 | End: 2022-11-02 | Stop reason: HOSPADM

## 2022-10-25 RX ORDER — HYDROMORPHONE HYDROCHLORIDE 1 MG/ML
0.2 INJECTION, SOLUTION INTRAMUSCULAR; INTRAVENOUS; SUBCUTANEOUS
Status: DISCONTINUED | OUTPATIENT
Start: 2022-10-25 | End: 2022-10-26

## 2022-10-25 RX ORDER — MORPHINE SULFATE 2 MG/ML
2 INJECTION, SOLUTION INTRAMUSCULAR; INTRAVENOUS
Status: COMPLETED | OUTPATIENT
Start: 2022-10-25 | End: 2022-10-25

## 2022-10-25 RX ORDER — NALOXONE HCL 0.4 MG/ML
0.02 VIAL (ML) INJECTION
Status: DISCONTINUED | OUTPATIENT
Start: 2022-10-25 | End: 2022-11-02 | Stop reason: HOSPADM

## 2022-10-25 RX ORDER — ROCURONIUM BROMIDE 10 MG/ML
INJECTION, SOLUTION INTRAVENOUS
Status: DISCONTINUED | OUTPATIENT
Start: 2022-10-25 | End: 2022-10-25

## 2022-10-25 RX ORDER — DEXAMETHASONE SODIUM PHOSPHATE 4 MG/ML
INJECTION, SOLUTION INTRA-ARTICULAR; INTRALESIONAL; INTRAMUSCULAR; INTRAVENOUS; SOFT TISSUE
Status: DISCONTINUED | OUTPATIENT
Start: 2022-10-25 | End: 2022-10-25

## 2022-10-25 RX ORDER — PROCHLORPERAZINE EDISYLATE 5 MG/ML
5 INJECTION INTRAMUSCULAR; INTRAVENOUS EVERY 4 HOURS PRN
Status: DISCONTINUED | OUTPATIENT
Start: 2022-10-25 | End: 2022-11-02 | Stop reason: HOSPADM

## 2022-10-25 RX ORDER — MORPHINE SULFATE 4 MG/ML
4 INJECTION, SOLUTION INTRAMUSCULAR; INTRAVENOUS
Status: DISCONTINUED | OUTPATIENT
Start: 2022-10-25 | End: 2022-10-25

## 2022-10-25 RX ORDER — ATORVASTATIN CALCIUM 20 MG/1
20 TABLET, FILM COATED ORAL NIGHTLY
Status: DISCONTINUED | OUTPATIENT
Start: 2022-10-25 | End: 2022-11-02 | Stop reason: HOSPADM

## 2022-10-25 RX ORDER — ONDANSETRON 2 MG/ML
4 INJECTION INTRAMUSCULAR; INTRAVENOUS EVERY 6 HOURS PRN
Status: DISCONTINUED | OUTPATIENT
Start: 2022-10-25 | End: 2022-10-25

## 2022-10-25 RX ORDER — ALBUTEROL SULFATE 90 UG/1
2 AEROSOL, METERED RESPIRATORY (INHALATION) EVERY 6 HOURS PRN
Status: DISCONTINUED | OUTPATIENT
Start: 2022-10-25 | End: 2022-10-26

## 2022-10-25 RX ORDER — HYDROMORPHONE HYDROCHLORIDE 1 MG/ML
0.2 INJECTION, SOLUTION INTRAMUSCULAR; INTRAVENOUS; SUBCUTANEOUS EVERY 5 MIN PRN
Status: COMPLETED | OUTPATIENT
Start: 2022-10-25 | End: 2022-10-25

## 2022-10-25 RX ORDER — SODIUM CHLORIDE 0.9 % (FLUSH) 0.9 %
10 SYRINGE (ML) INJECTION
Status: DISCONTINUED | OUTPATIENT
Start: 2022-10-25 | End: 2022-10-25 | Stop reason: HOSPADM

## 2022-10-25 RX ORDER — FLUTICASONE FUROATE AND VILANTEROL 200; 25 UG/1; UG/1
1 POWDER RESPIRATORY (INHALATION) DAILY
Status: DISCONTINUED | OUTPATIENT
Start: 2022-10-25 | End: 2022-11-02 | Stop reason: HOSPADM

## 2022-10-25 RX ORDER — PROPOFOL 10 MG/ML
VIAL (ML) INTRAVENOUS
Status: DISCONTINUED | OUTPATIENT
Start: 2022-10-25 | End: 2022-10-25

## 2022-10-25 RX ORDER — SUCCINYLCHOLINE CHLORIDE 20 MG/ML
INJECTION INTRAMUSCULAR; INTRAVENOUS
Status: DISCONTINUED | OUTPATIENT
Start: 2022-10-25 | End: 2022-10-25

## 2022-10-25 RX ORDER — PROCHLORPERAZINE EDISYLATE 5 MG/ML
5 INJECTION INTRAMUSCULAR; INTRAVENOUS EVERY 6 HOURS PRN
Status: DISCONTINUED | OUTPATIENT
Start: 2022-10-25 | End: 2022-10-25

## 2022-10-25 RX ORDER — FENTANYL CITRATE 50 UG/ML
INJECTION, SOLUTION INTRAMUSCULAR; INTRAVENOUS
Status: DISCONTINUED | OUTPATIENT
Start: 2022-10-25 | End: 2022-10-25

## 2022-10-25 RX ORDER — METRONIDAZOLE 500 MG/100ML
INJECTION, SOLUTION INTRAVENOUS
Status: DISCONTINUED | OUTPATIENT
Start: 2022-10-25 | End: 2022-10-25

## 2022-10-25 RX ADMIN — HYDROMORPHONE HYDROCHLORIDE 0.2 MG: 1 INJECTION, SOLUTION INTRAMUSCULAR; INTRAVENOUS; SUBCUTANEOUS at 12:10

## 2022-10-25 RX ADMIN — INSULIN ASPART 2 UNITS: 100 INJECTION, SOLUTION INTRAVENOUS; SUBCUTANEOUS at 11:10

## 2022-10-25 RX ADMIN — FENTANYL CITRATE 25 MCG: 50 INJECTION, SOLUTION INTRAMUSCULAR; INTRAVENOUS at 08:10

## 2022-10-25 RX ADMIN — HYDROMORPHONE HYDROCHLORIDE 0.2 MG: 1 INJECTION, SOLUTION INTRAMUSCULAR; INTRAVENOUS; SUBCUTANEOUS at 11:10

## 2022-10-25 RX ADMIN — PHENYLEPHRINE HYDROCHLORIDE 100 MCG: 10 INJECTION INTRAVENOUS at 08:10

## 2022-10-25 RX ADMIN — HYDROMORPHONE HYDROCHLORIDE 0.2 MG: 1 INJECTION, SOLUTION INTRAMUSCULAR; INTRAVENOUS; SUBCUTANEOUS at 01:10

## 2022-10-25 RX ADMIN — PHENYLEPHRINE HYDROCHLORIDE 200 MCG: 10 INJECTION INTRAVENOUS at 08:10

## 2022-10-25 RX ADMIN — FENTANYL CITRATE 25 MCG: 50 INJECTION, SOLUTION INTRAMUSCULAR; INTRAVENOUS at 09:10

## 2022-10-25 RX ADMIN — IPRATROPIUM BROMIDE AND ALBUTEROL SULFATE 3 ML: 2.5; .5 SOLUTION RESPIRATORY (INHALATION) at 01:10

## 2022-10-25 RX ADMIN — SUGAMMADEX 200 MG: 100 INJECTION, SOLUTION INTRAVENOUS at 10:10

## 2022-10-25 RX ADMIN — ONDANSETRON 4 MG: 2 INJECTION INTRAMUSCULAR; INTRAVENOUS at 05:10

## 2022-10-25 RX ADMIN — FLUTICASONE FUROATE AND VILANTEROL TRIFENATATE 1 PUFF: 200; 25 POWDER RESPIRATORY (INHALATION) at 02:10

## 2022-10-25 RX ADMIN — INSULIN ASPART 5 UNITS: 100 INJECTION, SOLUTION INTRAVENOUS; SUBCUTANEOUS at 06:10

## 2022-10-25 RX ADMIN — LIDOCAINE HYDROCHLORIDE 100 MG: 20 INJECTION INTRAVENOUS at 07:10

## 2022-10-25 RX ADMIN — PROPOFOL 90 MG: 10 INJECTION, EMULSION INTRAVENOUS at 07:10

## 2022-10-25 RX ADMIN — CEFTRIAXONE SODIUM 2 G: 1 INJECTION, SOLUTION INTRAVENOUS at 08:10

## 2022-10-25 RX ADMIN — DEXTROSE AND SODIUM CHLORIDE: 5; .45 INJECTION, SOLUTION INTRAVENOUS at 11:10

## 2022-10-25 RX ADMIN — PHENYLEPHRINE HYDROCHLORIDE 100 MCG: 10 INJECTION INTRAVENOUS at 07:10

## 2022-10-25 RX ADMIN — EPHEDRINE SULFATE 10 MG: 50 INJECTION INTRAVENOUS at 07:10

## 2022-10-25 RX ADMIN — MONTELUKAST 10 MG: 10 TABLET, FILM COATED ORAL at 11:10

## 2022-10-25 RX ADMIN — DEXAMETHASONE SODIUM PHOSPHATE 4 MG: 4 INJECTION, SOLUTION INTRAMUSCULAR; INTRAVENOUS at 08:10

## 2022-10-25 RX ADMIN — DEXTROSE AND SODIUM CHLORIDE: 5; .45 INJECTION, SOLUTION INTRAVENOUS at 06:10

## 2022-10-25 RX ADMIN — METRONIDAZOLE 500 MG: 500 INJECTION, SOLUTION INTRAVENOUS at 07:10

## 2022-10-25 RX ADMIN — SUCCINYLCHOLINE CHLORIDE 100 MG: 20 INJECTION, SOLUTION INTRAMUSCULAR; INTRAVENOUS at 07:10

## 2022-10-25 RX ADMIN — LATANOPROST 1 DROP: 50 SOLUTION OPHTHALMIC at 11:10

## 2022-10-25 RX ADMIN — IOHEXOL 75 ML: 350 INJECTION, SOLUTION INTRAVENOUS at 02:10

## 2022-10-25 RX ADMIN — ONDANSETRON 4 MG: 2 INJECTION INTRAMUSCULAR; INTRAVENOUS at 10:10

## 2022-10-25 RX ADMIN — METOROPROLOL TARTRATE 5 MG: 5 INJECTION, SOLUTION INTRAVENOUS at 11:10

## 2022-10-25 RX ADMIN — METOROPROLOL TARTRATE 5 MG: 5 INJECTION, SOLUTION INTRAVENOUS at 06:10

## 2022-10-25 RX ADMIN — MORPHINE SULFATE 2 MG: 2 INJECTION, SOLUTION INTRAMUSCULAR; INTRAVENOUS at 01:10

## 2022-10-25 RX ADMIN — SODIUM CHLORIDE, SODIUM GLUCONATE, SODIUM ACETATE, POTASSIUM CHLORIDE, MAGNESIUM CHLORIDE, SODIUM PHOSPHATE, DIBASIC, AND POTASSIUM PHOSPHATE: .53; .5; .37; .037; .03; .012; .00082 INJECTION, SOLUTION INTRAVENOUS at 07:10

## 2022-10-25 RX ADMIN — ATORVASTATIN CALCIUM 20 MG: 20 TABLET, FILM COATED ORAL at 11:10

## 2022-10-25 RX ADMIN — Medication 20 MG: at 08:10

## 2022-10-25 RX ADMIN — FENTANYL CITRATE 25 MCG: 50 INJECTION, SOLUTION INTRAMUSCULAR; INTRAVENOUS at 10:10

## 2022-10-25 RX ADMIN — SODIUM CHLORIDE: 9 INJECTION, SOLUTION INTRAVENOUS at 07:10

## 2022-10-25 RX ADMIN — ONDANSETRON 4 MG: 2 INJECTION INTRAMUSCULAR; INTRAVENOUS at 04:10

## 2022-10-25 RX ADMIN — SODIUM CHLORIDE 0.25 MCG/KG/MIN: 9 INJECTION, SOLUTION INTRAVENOUS at 08:10

## 2022-10-25 RX ADMIN — ROCURONIUM BROMIDE 5 MG: 10 INJECTION INTRAVENOUS at 07:10

## 2022-10-25 RX ADMIN — ROCURONIUM BROMIDE 25 MG: 10 INJECTION INTRAVENOUS at 08:10

## 2022-10-25 RX ADMIN — HYDRALAZINE HYDROCHLORIDE 10 MG: 20 INJECTION, SOLUTION INTRAMUSCULAR; INTRAVENOUS at 11:10

## 2022-10-25 RX ADMIN — EPHEDRINE SULFATE 5 MG: 50 INJECTION INTRAVENOUS at 10:10

## 2022-10-25 RX ADMIN — DEXMEDETOMIDINE HYDROCHLORIDE 4 MCG: 100 INJECTION, SOLUTION INTRAVENOUS at 10:10

## 2022-10-25 RX ADMIN — MORPHINE SULFATE 2 MG: 2 INJECTION, SOLUTION INTRAMUSCULAR; INTRAVENOUS at 05:10

## 2022-10-25 NOTE — NURSING TRANSFER
Nursing Transfer Note      10/25/2022     Reason patient is being transferred: to room post recovery    Transfer to 1059    Transfer via bed    Transfer with O2 @ 2LPM; Telemetry monitoring    Transported by PCT    Medicines sent: Breo inhaler, Novolog insulin pen, IVF infusing per pump    Any special needs or follow-up needed: N/A    Chart send with patient: Yes    Notified: Spouse, Son in Law    Patient reassessed at: 10/25/22 @ 1545    Upon arrival to floor: Taylor RN on [facility]

## 2022-10-25 NOTE — TRANSFER OF CARE
"Anesthesia Transfer of Care Note    Patient: Sussy Costa    Procedure(s) Performed: Procedure(s) (LRB):  COLECTOMY, RIGHT (Right)    Patient location: PACU    Anesthesia Type: general    Transport from OR: Transported from OR on 6-10 L/min O2 by face mask with adequate spontaneous ventilation    Post pain: adequate analgesia    Post assessment: no apparent anesthetic complications and tolerated procedure well    Post vital signs: stable    Level of consciousness: responds to stimulation    Nausea/Vomiting: no nausea/vomiting    Complications: none    Transfer of care protocol was followedComments: Nurse at bedside, VSS, spont reg resp noted      Last vitals:   Visit Vitals  BP (!) 156/71 (BP Location: Right arm, Patient Position: Lying)   Pulse 81   Temp 36.7 °C (98 °F) (Oral)   Resp 20   Ht 5' 5" (1.651 m)   Wt 47.2 kg (104 lb)   SpO2 (!) 94%   Breastfeeding No   BMI 17.31 kg/m²     "

## 2022-10-25 NOTE — ED TRIAGE NOTES
Sussy Wyattfelixholden, a 87 y.o. female presents to the ED w/ complaint of worsening generalized abdominal pain that is worse on R side since this morning. Describes pain as cramping. States has been feeling nauseous but denies vomiting. Hx of asthma and DM.     Triage note:  Chief Complaint   Patient presents with    Abdominal Pain     Abdominal cramping that started this morning around 0400. Pt states it has progressively worsened all day. Denies n/v.      Review of patient's allergies indicates:   Allergen Reactions    Aspirin Other (See Comments)     Asthma    TRIAD OF NASAL POLYPS, ASA  ALLERGY AND ASTHMA.        Aspirin Other (See Comments)    Nsaids (non-steroidal anti-inflammatory drug) Other (See Comments)     AVOID DUE TO TRIAD OF ASA ALLERGY, NASAL POLYPS,AND ASTHMA    Penicillin      Other reaction(s): Rash    9/30/20: tolerates ceftriaxone     Past Medical History:   Diagnosis Date    Asthma     Cyst of pancreas     liver    Diabetes mellitus type II     Eczema of hand     Glaucoma     Hyperlipidemia     Hypertension     Lichen planus     gums    Osteoporosis     Pulmonary nodules

## 2022-10-25 NOTE — ANESTHESIA PROCEDURE NOTES
Intubation    Date/Time: 10/25/2022 7:43 AM  Performed by: Bella Perera CRNA  Authorized by: LORIE Merritt MD     Intubation:     Induction:  Rapid sequence induction    Intubated:  Postinduction    Mask Ventilation:  N/a    Attempts:  1    Attempted By:  CRNA    Method of Intubation:  Video laryngoscopy    Blade:  Duran 3    Laryngeal View Grade: Grade I - full view of cords      Difficult Airway Encountered?: No      Complications:  None    Airway Device:  Oral endotracheal tube    Airway Device Size:  7.0    Style/Cuff Inflation:  Cuffed (inflated to minimal occlusive pressure)    Tube secured:  21    Secured at:  The lips    Placement Verified By:  Capnometry    Complicating Factors:  None    Findings Post-Intubation:  BS equal bilateral and atraumatic/condition of teeth unchanged  Notes:      Head and neck neutral

## 2022-10-25 NOTE — CONSULTS
Colon and Rectal Surgery Consultation     Patient Name:        Sussy Costa  MRN:          [id number]  YOB: 1935  (87 y.o.)  Encounter Date:        10/25/2022  Primary Care Physician: EZ Casillas MD    CC:     HPI:  Sussy Costa is a 87 y.o. 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 Ms. Costa developed RLQ acute-onset, constant, non-radiating cramping ~24hr ago; she subsequently had two small bowel movements yesterday morning without melena/hematochezia but has had no stool or flatus since. She endorses belching and hiccups but denies nausea/vomiting. She notes her abdomen has been increasingly distended. She denies fevers/chills/sweats.     Of note, she notes a 20 pound weight loss over the past year and 15 pound weight loss over the past 6 months. She was hospitalized 7/2022 for severe asthma exacerbation and has had two partially collapsed lower lobes requiring bronchoscopy with solumedrol burst. She started her most recent course of solumedrol 4mg TID 10/20/22 which she took until symptom onset.     Bowel Habits:  Number of bowel movements per day: 2  Consistency of bowel movements: 5  Does the patient strain with bowel movements: No  Does the patient have bleeding with bowel movements: No  Does the patient have perianal pain: No  Does the patient have incontinence to solid bowel movements, liquid movements, or gas: Yes, occasional incontinence to liquid movements - previously had consistent incontinence to liquid movements while on metformin which has improved since stopping metformin and  starting creon supplementation  Does the patient have perianal tissue protrusion: No    Colonoscopy:  Date: 1/6/2011  Unable to access prior report, multiple polyps per patient    PAST MEDICAL HISTORY:  The patient specifically denies personal history of Crohn's disease, ulcerative colitis, abdominal/pelvic radiotherapy, easy bruising/epistaxis or any previous episodes of bleeding with prior surgery, dental extractions, or trauma.  Past Medical History:   Diagnosis Date    Asthma     Cyst of pancreas     liver    Diabetes mellitus type II     Eczema of hand     Glaucoma     Hyperlipidemia     Hypertension     Lichen planus     gums    Osteoporosis     Pulmonary nodules      PAST SURGICAL HISTORY:  Past Surgical History:   Procedure Laterality Date    BRONCHOSCOPY N/A 5/13/2022    Procedure: Bronchoscopy;  Surgeon: Tina Diagnostic Provider;  Location: [facility] OR [address] [place] [address];  Service: Anesthesiology;  Laterality: N/A;    CATARACT EXTRACTION, BILATERAL      CHOLECYSTECTOMY      ENDOSCOPIC ULTRASOUND OF UPPER GASTROINTESTINAL TRACT N/A 4/22/2022    Procedure: ULTRASOUND, UPPER GI TRACT, ENDOSCOPIC;  Surgeon: Max Rosado MD;  Location: [facility] ENDO ([address] [place] [address]);  Service: Endoscopy;  Laterality: N/A;  urgent EUS (linear) for abnormal CT scan with dilated PD and increased cyst of pancreas cyst.  pap 44 mmHg  4/13:fully vaccinated. instructions via portal-SC    EPIDURAL STEROID INJECTION INTO LUMBAR SPINE N/A 11/8/2018    Procedure: Injection-steroid-epidural-lumbar L5-S1;  Surgeon: Nicci Osorio Jr., MD;  Location: [facility] PAIN MGT;  Service: Pain Management;  Laterality: N/A;    FUNCTIONAL ENDOSCOPIC SINUS SURGERY (FESS) USING COMPUTER-ASSISTED NAVIGATION N/A 6/17/2019    Procedure: FESS, USING COMPUTER-ASSISTED NAVIGATION;  Surgeon: Rosangela Isabel MD;  Location: [facility] OR;  Service: ENT;  Laterality: N/A;  video    HYSTERECTOMY      nasal polyps         SOCIAL HISTORY:  Tobacco: Never  ETOH: Rare  IVDA:  Denies    Social History     Tobacco Use    Smoking status: Never    Smokeless tobacco: Never   Substance Use Topics    Alcohol use: Yes     Comment: socially    Drug use: No     FAMILY HISTORY:  The patient specifically denies a family history of colorectal cancer, Crohn's disease/ulcerative colitis/IBD, bleeding diatheses or adverse effects of anesthesia.  Family History   Problem Relation Age of Onset    Heart disease Father     Heart disease Brother     Hypertension Brother        MEDICATIONS:  Home Meds:   Prior to Admission medications    Medication Sig Start Date End Date Taking? Authorizing Provider   ACCU-CHEK GUIDE TEST STRIPS Strp TEST FOUR TIMES DAILY WITH MEALS AND NIGHTLY 10/17/22   Joshua Martines MD   albuterol (PROAIR HFA) 90 mcg/actuation inhaler Inhale 2 puffs into the lungs every 6 (six) hours as needed for Wheezing. Rescue 5/17/22   PAULA Casillas MD   albuterol (PROVENTIL) 2.5 mg /3 mL (0.083 %) nebulizer solution Take 3 mLs (2.5 mg total) by nebulization every 6 (six) hours as needed for Wheezing or Shortness of Breath. Rescue 5/17/22 5/17/23  Papa Peguero MD   atorvastatin (LIPITOR) 20 MG tablet Take 1 tablet (20 mg total) by mouth every evening. 9/22/22   PAULA Casillas MD   blood sugar diagnostic Strp 1 strip by Misc.(Non-Drug; Combo Route) route 4 (four) times daily with meals and nightly. 10/17/22   Joshua Martines MD   calcium carbonate (OS-JUNIE) 600 mg calcium (1,500 mg) Tab Take 600 mg by mouth once.    Historical Provider   carvediloL (COREG) 12.5 MG tablet Take 1 tablet (12.5 mg total) by mouth 2 (two) times daily with meals. 7/11/22 7/11/23  Jenny Mckeon MD   fluticasone-salmeterol diskus inhaler 500-50 mcg Inhale 1 puff into the lungs 2 (two) times daily. Controller 6/8/22   PAULA Casillas MD   furosemide (LASIX) 20 MG tablet Take 1 tablet (20 mg total) by mouth once daily. 7/11/22 7/11/23  Jenny Mckeon MD   glucagon (GVOKE HYPOPEN 2-PACK) 1 mg/0.2 mL  "AtIn Inject 1 mg into the skin as needed (severe hypoglycemia). 7/5/22   Joshua Martines MD   insulin aspart U-100 (NOVOLOG FLEXPEN U-100 INSULIN) 100 unit/mL (3 mL) InPn pen Take 10 units with breakfast and lunch, and 12 units with dinner, plus correction scale, max TDD 40 units 9/28/22   Joshua Martines MD   insulin detemir U-100 (LEVEMIR FLEXTOUCH U-100 INSULN) 100 unit/mL (3 mL) InPn pen Inject 3 Units into the skin 2 (two) times daily. 3/15/22 3/15/23  Joshua Martines MD   irbesartan (AVAPRO) 300 MG tablet TAKE 1 TABLET BY MOUTH EVERY EVENING 3/8/22   PAULA Casillas MD   lancets (ACCU-CHEK FASTCLIX LANCET DRUM) [facility] CHECK BLOOD GLUCOSE FOUR TIMES DAILY 3/15/22   Joshua Martines MD   latanoprost 0.005 % ophthalmic solution Inject into the eye. 1 Drops Ophthalmic Every evening    Historical Provider   levocetirizine (XYZAL) 5 MG tablet Take 1 tablet (5 mg total) by mouth every evening. 6/7/22 6/7/23  Rosangela Isabel MD   lipase-protease-amylase 24,000-76,000-120,000 units (CREON) 24,000-76,000 -120,000 unit capsule Take 2 capsules with meals orally and 1 capsule with snacks. 6/24/22   Max Rosado MD   magnesium oxide (MAG-OX) 400 mg (241.3 mg magnesium) tablet Take 400 mg by mouth once daily.    Historical Provider   methylPREDNISolone (MEDROL DOSEPACK) 4 mg tablet use as directed 10/19/22 11/9/22  Papa Peguero MD   montelukast (SINGULAIR) 10 mg tablet TAKE 1 TABLET BY MOUTH EVERY EVENING 4/13/22   PAULA Casillas MD   neomycin-polymyxin-hydrocortisone (CORTISPORIN) 3.5-10,000-1 mg/mL-unit/mL-% otic suspension Use bid for nails 10/1/21   Isabel Browning MD   olopatadine (PATANOL) 0.1 % ophthalmic solution Place 1 drop into both eyes 2 (two) times daily as needed. 1 Drops Ophthalmic Twice a day     Historical Provider   pen needle, diabetic 31 gauge x 5/16" Ndle Inject 1 each into the skin 4 (four) times daily with meals and nightly. Use with insulin pen 7/5/22   Joshua Martines MD "   vitamin D (VITAMIN D3) 1000 units Tab Take 1,000 Units by mouth once daily.    Historical Provider   risedronate (ACTONEL) 35 MG tablet Take 1 tablet (35 mg total) by mouth every 7 days. 8/26/21 4/22/22  Joshua Martines MD     ALLERGIES:  Aspirin, Aspirin, Nsaids (non-steroidal anti-inflammatory drug), and Penicillin    REVIEW OF SYSTEMS:  General: +weight loss per HPI; Denies fevers/chills/sweats or night sweats  Respiratory: +chronic cough/SOB from asthma; Denies hemoptysis or other respiratory problems  Cardiovascular: Denies chest pain, OLIVERA, orthopnea/PND, palpitations, or syncope  Gastrointestinal: Denies GI symptoms currently, aside from those detailed above  Genitourinary:  Denies dysuria, urinary incontinence, urinary frequency, hematuria, fecaluria or pneumaturia  Musculoskeletal: Denies new focal bone pain or musculoskeletal complaints  Neurologic: Denies headaches, focal numbness/weakness, or prior CVA or seizures   Psychiatric: Denies depression, anxiety, or other psychiatric disorders  Hematologic/Lymphatic/Immunologic: Denies easy bruising or bleeding    PHYSICAL EXAM:  PHYSICAL EXAMINATION:  Well-developed, well nourished, in no acute distress  Blood pressure (!) 198/95, pulse 80, temperature 98.2 °F (36.8 °C), temperature source Oral, resp. rate 18, weight 47.2 kg (104 lb), SpO2 98 %.  Body mass index is 17.31 kg/m².  HEENT: Sclerae anicteric, trachea midline  Nodes: No supraclavicular, periumbilical or inguinal lymph adenopathy bilaterally  Pulm: Normal respiratory rate and effort on room air  Abd:   Soft, moderately distended, mild TTP in RLQ over palpable mass/fullness (likely distended cecum) without rebound/guarding/peritonitis. Well-healed infraumbilical midline incision.  Ext:   Warm and well perfused. No upper or lower extremity edema bilaterally.  YOEL: Deferred pending OR  Neuro: Grossly intact with no focal neurological deficit.    LABORATORY RESULTS:  Complete Blood Count:  Lab Results    Component Value Date/Time    WBC 10.77 10/24/2022 11:33 PM    HGB 12.7 10/24/2022 11:33 PM    HCT 40.9 10/24/2022 11:33 PM    HCT 38 07/05/2022 02:33 PM    RBC 4.37 10/24/2022 11:33 PM     10/24/2022 11:33 PM     Basic Chemistry Panel:  Lab Results   Component Value Date/Time     (L) 10/24/2022 11:33 PM    K 3.7 10/24/2022 11:33 PM     10/24/2022 11:33 PM    CO2 24 10/24/2022 11:33 PM    BUN 21 10/24/2022 11:33 PM    CREATININE 0.7 10/24/2022 11:33 PM    CALCIUM 9.0 10/24/2022 11:33 PM     Hepatic Panel:  Lab Results   Component Value Date/Time    ALKPHOS 48 (L) 10/24/2022 11:33 PM    ALBUMIN 3.3 (L) 10/24/2022 11:33 PM     Nutrition Labs:  Lab Results   Component Value Date/Time    ALBUMIN 3.3 (L) 10/24/2022 11:33 PM     Coagulation Panel:  Lab Results   Component Value Date/Time    INR 1.2 07/05/2022 01:16 PM     IMAGING RESULTS:  CT Abd/Pelv w Contrast (10/25/22):  1. Circumferential wall thickening of the short segment of ascending colon and apple-core appearance with pericolic fat stranding and free fluid, resulting in significant mass effect and dilatation of the cecum up to measuring 10 cm.  Suspect obstructing ascending colon neoplasm.  Recommend clinical correlation and further evaluation with colonoscopy and colorectal surgical consultation.  2. Diffuse gastric wall thickening and enhancement suggesting gastritis.  3. Stable innumerable pancreatic hypodense lesion.  4. Pulmonary nodules similar in size and number in comparison prior.  Metastasis not excluded.  5. Multiple hypodense lesions in the liver and a few in the spleen as well, similar to prior.  Metastasis not excluded.    CT Chest w Contrast (4/23/22):  1. Complete atelectasis of the left lower lobe with compensatory expansion of the left upper lobe and leftward mediastinal shift, new when compared to CT 04/11/2022.  Layering secretions within the trachea and left mainstem bronchus.  Narrowing of the left lower lobe bronchus  to 2 mm.  2. Solid nodules in the right lower lobe measuring up to 0.9 cm, not significantly changed when compared to CT 10/20/2011.  Innumerable solid and ground-glass micro nodules throughout both lungs noting appearance is overall similar to CT 10/20/2011.  Findings may be seen in the setting of an infectious or inflammatory process with malignancy less likely.  3. Additional findings as above.    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  Recent steroid use (solumedrol 12mg daily x4d, solumedrol 5mg daily x1 week earlier this month)  Asthma c/b recent severe exacerbation requiring hospitalization and recurrent mucus plugging/partial lower lobe collapse  Malnutrition with 15 pound weight loss of 6 months, BMI 17    PLAN:  - Plan for OR: open right hemicolectomy with possible diverting loop ileostomy  - NPO/IVF  - Continue beta blockade and statin perioperatively as permitted  - Hold home lantus, +SSI  - Hold home antiHTN, hydralazine prn  - Cont home inhalers/singulair  - Rest of plan pending intraoperative findings and stability    Patient discussed with the attending surgeon, Dr. Holman.    Jim Smith MD  Colon and Rectal Surgery Fellow

## 2022-10-25 NOTE — ED PROVIDER NOTES
Encounter Date: 10/24/2022       History     Chief Complaint   Patient presents with    Abdominal Pain     Abdominal cramping that started this morning around 0400. Pt states it has progressively worsened all day. Denies n/v.      87-year-old female with a history type 2 diabetes, hypertension, hyperlipidemia presenting to the ED with generalized abdominal pain since early this morning.  She states it has been all over, though more focused on her right lower quadrant.  Got worse over the past few hours.  No nausea, vomiting, fevers, chills, diarrhea or constipation.  No dysuria or change in urinary frequency urgency.  Has not taken any medication for her abdominal pain.  She describes as crampy, constant, nonradiating.      Review of patient's allergies indicates:   Allergen Reactions    Aspirin Other (See Comments)     Asthma    TRIAD OF NASAL POLYPS, ASA  ALLERGY AND ASTHMA.        Aspirin Other (See Comments)    Nsaids (non-steroidal anti-inflammatory drug) Other (See Comments)     AVOID DUE TO TRIAD OF ASA ALLERGY, NASAL POLYPS,AND ASTHMA    Penicillin      Other reaction(s): Rash    9/30/20: tolerates ceftriaxone     Past Medical History:   Diagnosis Date    Asthma     Cyst of pancreas     liver    Diabetes mellitus type II     Eczema of hand     Glaucoma     Hyperlipidemia     Hypertension     Lichen planus     gums    Osteoporosis     Pulmonary nodules      Past Surgical History:   Procedure Laterality Date    BRONCHOSCOPY N/A 5/13/2022    Procedure: Bronchoscopy;  Surgeon: Tina Diagnostic Provider;  Location: [facility] OR [address] [place] [address];  Service: Anesthesiology;  Laterality: N/A;    CATARACT EXTRACTION, BILATERAL      CHOLECYSTECTOMY      ENDOSCOPIC ULTRASOUND OF UPPER GASTROINTESTINAL TRACT N/A 4/22/2022    Procedure: ULTRASOUND, UPPER GI TRACT, ENDOSCOPIC;  Surgeon: Max Rosado MD;  Location: [facility] ([address] [place] [address]);  Service: Endoscopy;  Laterality: N/A;  urgent EUS (linear) for abnormal CT scan with dilated PD  and increased cyst of pancreas cyst.  pap 44 mmHg  4/13:fully vaccinated. instructions via portal-SC    EPIDURAL STEROID INJECTION INTO LUMBAR SPINE N/A 11/8/2018    Procedure: Injection-steroid-epidural-lumbar L5-S1;  Surgeon: Nicci Osorio Jr., MD;  Location: [facility];  Service: Pain Management;  Laterality: N/A;    FUNCTIONAL ENDOSCOPIC SINUS SURGERY (FESS) USING COMPUTER-ASSISTED NAVIGATION N/A 6/17/2019    Procedure: FESS, USING COMPUTER-ASSISTED NAVIGATION;  Surgeon: Rosangela Isabel MD;  Location: [facility] OR;  Service: ENT;  Laterality: N/A;  video    HYSTERECTOMY      nasal polyps       Family History   Problem Relation Age of Onset    Heart disease Father     Heart disease Brother     Hypertension Brother      Social History     Tobacco Use    Smoking status: Never    Smokeless tobacco: Never   Substance Use Topics    Alcohol use: Yes     Comment: socially    Drug use: No     Review of Systems   Constitutional:  Negative for fever.   HENT:  Negative for sore throat.    Respiratory:  Negative for shortness of breath.    Cardiovascular:  Negative for chest pain.   Gastrointestinal:  Positive for abdominal pain. Negative for constipation, diarrhea, nausea and vomiting.   Genitourinary:  Negative for dysuria.   Musculoskeletal:  Negative for back pain.   Skin:  Negative for rash.   Neurological:  Negative for weakness.   Hematological:  Does not bruise/bleed easily.     Physical Exam     Initial Vitals [10/24/22 2318]   BP Pulse Resp Temp SpO2   (!) 201/91 88 20 98.1 °F (36.7 °C) 96 %      MAP       --         Physical Exam    Nursing note and vitals reviewed.  Constitutional: She appears well-developed and well-nourished. She is not diaphoretic. No distress.   HENT:   Head: Normocephalic and atraumatic.   Eyes: Conjunctivae and EOM are normal. Right eye exhibits no discharge. Left eye exhibits no discharge. No scleral icterus.   Neck:   Normal range of motion.  Cardiovascular:  Normal rate and  regular rhythm.     Exam reveals no gallop and no friction rub.       No murmur heard.  Pulmonary/Chest: No respiratory distress. She has no wheezes. She has no rhonchi. She has no rales. She exhibits no tenderness.   Abdominal: Abdomen is soft. She exhibits no distension and no mass. There is abdominal tenderness.   RLQ TTP, positive rovsing sign. There is no rebound and no guarding.   Musculoskeletal:      Cervical back: Normal range of motion.     Neurological: She is alert.   Skin: Skin is warm and dry. No erythema. No pallor.       ED Course   Procedures  Labs Reviewed   CBC W/ AUTO DIFFERENTIAL - Abnormal; Notable for the following components:       Result Value    MCHC 31.1 (*)     Gran # (ANC) 8.0 (*)     Gran % 74.2 (*)     All other components within normal limits    Narrative:     For upper or mid abdominal pain.   COMPREHENSIVE METABOLIC PANEL - Abnormal; Notable for the following components:    Sodium 135 (*)     Glucose 182 (*)     Albumin 3.3 (*)     Alkaline Phosphatase 48 (*)     AST 56 (*)     ALT 60 (*)     All other components within normal limits    Narrative:     For upper or mid abdominal pain.   URINALYSIS, REFLEX TO URINE CULTURE - Abnormal; Notable for the following components:    Glucose, UA Trace (*)     All other components within normal limits    Narrative:     In and Out Cath as needed it patient unable to void  Specimen Source->Urine   POCT GLUCOSE - Abnormal; Notable for the following components:    POCT Glucose 211 (*)     All other components within normal limits   LIPASE    Narrative:     For upper or mid abdominal pain.   CEA   CEA   HEMOGLOBIN A1C   POCT GLUCOSE MONITORING CONTINUOUS     EKG Readings: (Independently Interpreted)   Normal sinus rhythm, rate of 85. No ST elevations or depressions concerning for ischemia.  No STEMI per my independent interpretation       Imaging Results               CT Abdomen Pelvis With Contrast (Final result)  Result time 10/25/22 03:31:38       Final result by Cj Leal MD (10/25/22 03:31:38)                   Impression:      1. Circumferential wall thickening of the short segment of ascending colon and apple-core appearance with pericolic fat stranding and free fluid, resulting in significant mass effect and dilatation of the cecum up to measuring 10 cm.  Suspect obstructing ascending colon neoplasm.  Recommend clinical correlation and further evaluation with colonoscopy and colorectal surgical consultation.  2. Diffuse gastric wall thickening and enhancement suggesting gastritis.  3. Stable innumerable pancreatic hypodense lesion.  4. Pulmonary nodules similar in size and number in comparison prior.  Metastasis not excluded.  5. Multiple hypodense lesions in the liver and a few in the spleen as well, similar to prior.  Metastasis not excluded.  6. Additional findings as above.  This report was flagged in Epic as abnormal.    COMMUNICATION  This critical result was discovered/received at 02:40.  The critical information above was relayed directly by Virginia Melgar MD secure chest to Dideir Harvey MD at 02:55.    Electronically signed by resident: Virginia Melgar  Date:    10/25/2022  Time:    02:16    Electronically signed by: Cj Leal MD  Date:    10/25/2022  Time:    03:31               Narrative:    EXAMINATION:  CT ABDOMEN PELVIS WITH CONTRAST    CLINICAL HISTORY:  RLQ abdominal pain (Age >= 14y);    TECHNIQUE:  Low dose axial images, sagittal and coronal reformations were obtained from the lung bases to the pubic symphysis following the IV administration of 75 mL of Omnipaque 350.Oral contrast was not given.    COMPARISON:  CT chest 05/06/2022 CT abdomen 04/11/2022    FINDINGS:  Lower thorax:    Heart: Enlarged in size. No pericardial effusion. Calcification the mitral valve.    Lung Bases: Innumerable solid and ground-glass nodules similar in size and number in comparison to prior largest right measuring 0.9 cm (axial series:  Image 15).  No pleural fluid.    Liver: Normal in size and attenuation.  Stable several hypodensities resembling liver cysts, largest measuring 1 cm (series image 61).    Gallbladder: No calcified gallstones.    Bile Ducts: No evidence of dilated ducts.    Pancreas: Diffuse parenchymal atrophy with calcifications.  Numerous pancreatic cysts similar in size in to prior.    Spleen: Small subcentimeter lesions, similar to prior.    Adrenals: Unremarkable.    Kidneys/ Ureters: Normal in size and location. Normal enhancement. No hydronephrosis or nephrolithiasis. No ureteral dilatation. Small bilateral hypodensities, too small to characterize, likely renal cysts (axial series image 68 and 63, 46).    Bladder: No evidence of wall thickening.    Reproductive organs: Uterus is not visualized    Distal esophagus and stomach: Diffuse gastric wall thickening and enhancement suggesting gastritis.    Bowel/Mesentery: Small bowel is normal in caliber with no evidence of obstruction. No evidence of inflammation or wall thickening.  Circumferential wall thickening of the mid descending colon with apple-core configuration with extensive soft tissue stranding and trace of free fluid concerning for neoplasm (axial series image 86 and coronal series image 64 and 67) resulting in stenosis with significant dilatation of the proximal cecum measuring up to 10 cm (coronal series image 88).  No free air.    Retroperitoneum: No significant adenopathy.    Abdominal wall: Unremarkable.    Vasculature: Moderate significant atherosclerosis.  No aneurysm.    Bones: Degenerative changes with anterolisthesis of L4 on L5.  No acute fracture.  Osseous structures similar in appearance to prior.                                       Medications   albuterol inhaler 2 puff (has no administration in time range)   atorvastatin tablet 20 mg (has no administration in time range)   metoprolol injection 5 mg (5 mg Intravenous Given 10/25/22 0601)   fluticasone  furoate-vilanteroL 200-25 mcg/dose diskus inhaler 1 puff (has no administration in time range)   latanoprost 0.005 % ophthalmic solution 1 drop (has no administration in time range)   montelukast tablet 10 mg (has no administration in time range)   sodium chloride 0.9% flush 10 mL (has no administration in time range)   naloxone 0.4 mg/mL injection 0.02 mg (has no administration in time range)   dextrose 5 % and 0.45 % NaCl infusion ( Intravenous New Bag 10/25/22 0608)   ondansetron injection 4 mg (has no administration in time range)   prochlorperazine injection Soln 5 mg (has no administration in time range)   hydrALAZINE injection 10 mg (has no administration in time range)   glucagon (human recombinant) injection 1 mg (has no administration in time range)   dextrose 10% bolus 125 mL (has no administration in time range)   dextrose 10% bolus 250 mL (has no administration in time range)   insulin aspart U-100 pen 0-5 Units (has no administration in time range)   morphine injection 2 mg (2 mg Intravenous Given 10/25/22 0100)   albuterol-ipratropium 2.5 mg-0.5 mg/3 mL nebulizer solution 3 mL (3 mLs Nebulization Given 10/25/22 0115)   iohexoL (OMNIPAQUE 350) injection 75 mL (75 mLs Intravenous Given 10/25/22 0211)   ondansetron injection 4 mg (4 mg Intravenous Given 10/25/22 0524)   morphine injection 2 mg (2 mg Intravenous Given 10/25/22 0524)     Medical Decision Making:   History:   Old Medical Records: I decided to obtain old medical records.  Initial Assessment:   87-year-old female presenting to the ED with right lower quadrant abdominal pain.  Fullness felt on right lower quadrant, tenderness to palpation.    Differential includes was not limited to appendicitis, colitis, UTI, SBO, intra abdominal abscess.    CBC without leukocytosis or anemia.  CMP showed no electrolyte abnormalities concerning for hospitalization.  Lipase within normal limits.  UA showed no concern for UTI.  CT abdomen pelvis showed concern  for large bowel obstruction, transition point at possible colon cancer.  Discussed with Colorectal surgery who evaluated patient, will admit onto their service.  Plan for OR.  Independently Interpreted Test(s):   I have ordered and independently interpreted EKG Reading(s) - see prior notes  Clinical Tests:   Lab Tests: Ordered and Reviewed  Radiological Study: Ordered and Reviewed  Medical Tests: Ordered and Reviewed  Other:   I have discussed this case with another health care provider.       <> Summary of the Discussion: [facility]           ED Course as of 10/25/22 0634   Tue Oct 25, 2022   0403 Discussed with Colorectal surgery who will evaluate patient. [AU]      ED Course User Index  [AU] Didier Harvey MD                 Clinical Impression:   Final diagnoses:  [R10.9] Abdominal cramping  [Z46.59] Encounter for nasogastric (NG) tube placement  [K56.609] Large bowel obstruction (Primary)        ED Disposition Condition    Admit                 Didier Harvey MD  Resident  10/25/22 0634

## 2022-10-25 NOTE — ANESTHESIA PROCEDURE NOTES
Arterial    Diagnosis: Colonic obstruction    Patient location during procedure: done out of OR  Procedure start time: 10/25/2022 7:55 AM  Timeout: 10/25/2022 7:51 AM  Procedure end time: 10/25/2022 7:57 AM    Staffing  Authorizing Provider: LORIE Merritt MD  Performing Provider: LORIE Merritt MD    Anesthesiologist was present at the time of the procedure.    Preanesthetic Checklist  Completed: patient identified, IV checked, site marked, risks and benefits discussed, surgical consent, monitors and equipment checked, pre-op evaluation, timeout performed and anesthesia consent givenArterial  Skin Prep: chlorhexidine gluconate and isopropyl alcohol  Local Infiltration: none  Orientation: left  Location: radial    Catheter Size: 20 G  Catheter placement by Anatomical landmarks. Heme positive aspiration all ports. Insertion Attempts: 1  Assessment  Dressing: secured with tape and tegaderm  Patient: Tolerated well

## 2022-10-25 NOTE — ANESTHESIA PREPROCEDURE EVALUATION
[facility]  Anesthesia Pre-Operative Evaluation         Patient Name: Sussy Costa  YOB: 1935  MRN: [id number]    SUBJECTIVE:     Pre-operative evaluation for Procedure(s) (LRB):  COLECTOMY, RIGHT (Right)  CREATION, ILEOSTOMY (N/A)     10/25/2022    Sussy Costa is a 87 y.o. 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 CT evidence of malignant ascending colon large bowel obstruction with competent ileocecal valve.    Patient now presents for the above procedure(s).    TTE Summary 7/6/2022:  · The left ventricle is normal in size with concentric remodeling and normal systolic function.  · The estimated ejection fraction is 60%.  · Indeterminate left ventricular diastolic function.  · Normal right ventricular size with normal right ventricular systolic function.  · There is mild mitral stenosis. Sclerosis and thickening extends in to the aortomitral curtain.  · The mean diastolic gradient across the mitral valve is 4 mmHg at a heart rate of 87 bpm.  · Normal central venous pressure (3 mmHg).  · The estimated PA systolic pressure is 26 mmHg.  · Small posterior pericardial effusion.       Prev airway:   Placement Date: 06/17/19; Method of Intubation: Duran, Bougie Intubation; Inserted by: CRNA; Airway Device: Oral Keri (no stylet); Mask Ventilation: Easy; Intubated: Postinduction; Blade: Nayeli #3; Airway Device Size: 7.0; Style: Cuffed; Cuff Inflation: Minimal occlusive pressure; Inflation Amount: 5; Placement Verified By: Auscultation, Capnometry, ETT Condensation; Grade: Grade II;  Complicating Factors: Anterior larynx; Intubation Findings: Positive EtCO2, Bilateral breath sounds, Atraumatic/Condition of teeth unchanged;  Depth of Insertion: 22; Securment: Lips; Complications: None; Breath Sounds: Equal Bilateral; Insertion Attempts: 1;    LDA:        Peripheral IV - Single Lumen 10/24/22 2345 20 G Left Antecubital (Active)   Site Assessment Clean;Dry;Intact 10/24/22 2345   Line Status Blood return noted;Flushed;Saline locked 10/24/22 2345   Dressing Status Clean;Dry;Intact 10/24/22 2345   Dressing Intervention First dressing 10/24/22 2345   Number of days: 0       Drips: None documented.      Patient Active Problem List   Diagnosis    HTN (hypertension)    Type 2 diabetes mellitus with hyperglycemia, with long-term current use of insulin    Hyperlipidemia    Glaucoma    Hypertension    OA (osteoarthritis)    Exacerbation of asthma    Hip pain    Leg swelling    Left knee pain    Pancreas cyst    Hypertension associated with diabetes    Dyslipidemia associated with type 2 diabetes mellitus    Senile osteoporosis    Pneumonia of left lower lobe due to infectious organism    Sepsis    Acute hypoxemic respiratory failure    Bilateral lower extremity edema    Spinal stenosis of lumbar region    Lumbar radiculopathy    Anterolisthesis    Lumbar spondylosis    Chronic pain    Other chronic sinusitis    Pneumonia of right middle lobe due to infectious organism    Diastolic dysfunction    Dyspnea    Adverse effect of corticosteroids    Moderate protein-calorie malnutrition    Chest tightness    Hypertensive urgency    Pneumonia of left lower lobe due to Pseudomonas species       Review of patient's allergies indicates:   Allergen Reactions    Aspirin Other (See Comments)     Asthma    TRIAD OF NASAL POLYPS, ASA  ALLERGY AND ASTHMA.        Aspirin Other (See Comments)    Nsaids (non-steroidal anti-inflammatory drug) Other (See Comments)     AVOID DUE TO TRIAD OF ASA  ALLERGY, NASAL POLYPS,AND ASTHMA    Penicillin      Other reaction(s): Rash    9/30/20: tolerates ceftriaxone       Current Inpatient Medications:      No current facility-administered medications on file prior to encounter.     Current Outpatient Medications on File Prior to Encounter   Medication Sig Dispense Refill    ACCU-CHEK GUIDE TEST STRIPS Strp TEST FOUR TIMES DAILY WITH MEALS AND NIGHTLY 200 strip 11    albuterol (PROAIR HFA) 90 mcg/actuation inhaler Inhale 2 puffs into the lungs every 6 (six) hours as needed for Wheezing. Rescue 18 g 3    albuterol (PROVENTIL) 2.5 mg /3 mL (0.083 %) nebulizer solution Take 3 mLs (2.5 mg total) by nebulization every 6 (six) hours as needed for Wheezing or Shortness of Breath. Rescue 60 each 11    atorvastatin (LIPITOR) 20 MG tablet Take 1 tablet (20 mg total) by mouth every evening. 90 tablet 0    blood sugar diagnostic Strp 1 strip by Misc.(Non-Drug; Combo Route) route 4 (four) times daily with meals and nightly. 200 strip 11    calcium carbonate (OS-JUNIE) 600 mg calcium (1,500 mg) Tab Take 600 mg by mouth once.      carvediloL (COREG) 12.5 MG tablet Take 1 tablet (12.5 mg total) by mouth 2 (two) times daily with meals. 60 tablet 11    fluticasone-salmeterol diskus inhaler 500-50 mcg Inhale 1 puff into the lungs 2 (two) times daily. Controller 180 each 3    furosemide (LASIX) 20 MG tablet Take 1 tablet (20 mg total) by mouth once daily. 30 tablet 0    glucagon (GVOKE HYPOPEN 2-PACK) 1 mg/0.2 mL AtIn Inject 1 mg into the skin as needed (severe hypoglycemia). 1 each 1    insulin aspart U-100 (NOVOLOG FLEXPEN U-100 INSULIN) 100 unit/mL (3 mL) InPn pen Take 10 units with breakfast and lunch, and 12 units with dinner, plus correction scale, max TDD 40 units 36 mL 3    insulin detemir U-100 (LEVEMIR FLEXTOUCH U-100 INSULN) 100 unit/mL (3 mL) InPn pen Inject 3 Units into the skin 2 (two) times daily. 15 mL 11    irbesartan (AVAPRO) 300 MG tablet TAKE 1 TABLET BY  "MOUTH EVERY EVENING 90 tablet 3    lancets (ACCU-CHEK FASTCLIX LANCET DRUM) [facility] CHECK BLOOD GLUCOSE FOUR TIMES DAILY 200 each 3    latanoprost 0.005 % ophthalmic solution Inject into the eye. 1 Drops Ophthalmic Every evening      levocetirizine (XYZAL) 5 MG tablet Take 1 tablet (5 mg total) by mouth every evening. 90 tablet 3    lipase-protease-amylase 24,000-76,000-120,000 units (CREON) 24,000-76,000 -120,000 unit capsule Take 2 capsules with meals orally and 1 capsule with snacks. 270 capsule 11    magnesium oxide (MAG-OX) 400 mg (241.3 mg magnesium) tablet Take 400 mg by mouth once daily.      methylPREDNISolone (MEDROL DOSEPACK) 4 mg tablet use as directed 21 each 1    montelukast (SINGULAIR) 10 mg tablet TAKE 1 TABLET BY MOUTH EVERY EVENING 30 tablet 6    neomycin-polymyxin-hydrocortisone (CORTISPORIN) 3.5-10,000-1 mg/mL-unit/mL-% otic suspension Use bid for nails 10 mL 6    olopatadine (PATANOL) 0.1 % ophthalmic solution Place 1 drop into both eyes 2 (two) times daily as needed. 1 Drops Ophthalmic Twice a day       pen needle, diabetic 31 gauge x 5/16" Ndle Inject 1 each into the skin 4 (four) times daily with meals and nightly. Use with insulin pen 200 each 11    vitamin D (VITAMIN D3) 1000 units Tab Take 1,000 Units by mouth once daily.      [DISCONTINUED] risedronate (ACTONEL) 35 MG tablet Take 1 tablet (35 mg total) by mouth every 7 days. 12 tablet 3       Past Surgical History:   Procedure Laterality Date    BRONCHOSCOPY N/A 5/13/2022    Procedure: Bronchoscopy;  Surgeon: Tina Diagnostic Provider;  Location: [facility] OR [address] [place] [address];  Service: Anesthesiology;  Laterality: N/A;    CATARACT EXTRACTION, BILATERAL      CHOLECYSTECTOMY      ENDOSCOPIC ULTRASOUND OF UPPER GASTROINTESTINAL TRACT N/A 4/22/2022    Procedure: ULTRASOUND, UPPER GI TRACT, ENDOSCOPIC;  Surgeon: Max Rosado MD;  Location: [facility] (2ND FLR);  Service: Endoscopy;  Laterality: N/A;  urgent EUS (linear) for abnormal CT " scan with dilated PD and increased cyst of pancreas cyst.  pap 44 mmHg  4/13:fully vaccinated. instructions via portal-SC    EPIDURAL STEROID INJECTION INTO LUMBAR SPINE N/A 11/8/2018    Procedure: Injection-steroid-epidural-lumbar L5-S1;  Surgeon: Nicci Osorio Jr., MD;  Location: [facility] PAIN MGT;  Service: Pain Management;  Laterality: N/A;    FUNCTIONAL ENDOSCOPIC SINUS SURGERY (FESS) USING COMPUTER-ASSISTED NAVIGATION N/A 6/17/2019    Procedure: FESS, USING COMPUTER-ASSISTED NAVIGATION;  Surgeon: Rosangela Isabel MD;  Location: [facility] OR;  Service: ENT;  Laterality: N/A;  video    HYSTERECTOMY      nasal polyps         Social History:  Tobacco Use: Low Risk     Smoking Tobacco Use: Never    Smokeless Tobacco Use: Never    Passive Exposure: Not on file      Alcohol Use: Unknown    Frequency of Alcohol Consumption: Patient refused    Average Number of Drinks: Patient refused    Frequency of Binge Drinking: Patient refused        OBJECTIVE:     Vital Signs Range (Last 24H):  Temp:  [36.5 °C (97.7 °F)-36.8 °C (98.2 °F)]   Pulse:  [74-88]   Resp:  [18-20]   BP: (198-213)/(91-96)   SpO2:  [95 %-98 %]       Significant Labs:  Lab Results   Component Value Date    WBC 10.77 10/24/2022    HGB 12.7 10/24/2022    HCT 40.9 10/24/2022     10/24/2022    CHOL 122 07/05/2022    TRIG 48 07/05/2022    HDL 54 07/05/2022    ALT 60 (H) 10/24/2022    AST 56 (H) 10/24/2022     (L) 10/24/2022    K 3.7 10/24/2022     10/24/2022    CREATININE 0.7 10/24/2022    BUN 21 10/24/2022    CO2 24 10/24/2022    TSH 2.897 07/05/2022    INR 1.2 07/05/2022    HGBA1C 8.2 (H) 07/05/2022       Diagnostic Studies: No relevant studies.    EKG:   Results for orders placed or performed during the hospital encounter of 07/05/22   EKG 12-lead    Collection Time: 07/05/22 11:35 PM    Narrative    Test Reason :     Vent. Rate : 095 BPM     Atrial Rate : 095 BPM     P-R Int : 166 ms          QRS Dur : 116 ms      QT Int : 402  ms       P-R-T Axes : 045 044 035 degrees     QTc Int : 505 ms    Normal sinus rhythm  Incomplete right bundle branch block  Possible Inferior infarct (cited on or before 05-JUL-2022)  Anterior infarct (cited on or before 05-JUL-2022)  Abnormal ECG  When compared with ECG of 05-JUL-2022 19:54,  No significant change was found  Confirmed by DORA RAMSEY MD ([id number]) on 7/7/2022 2:23:05 PM    Referred By: [email or web address]   SELF           Confirmed By:DORA RAMSEY MD       2D ECHO:  TTE:  Results for orders placed or performed during the hospital encounter of 07/05/22   Echo   Result Value Ref Range    STJ 2.55 cm    AV mean gradient 6 mmHg    Ao peak philip 1.57 m/s    Ao VTI 28.27 cm    IVS 0.90 0.6 - 1.1 cm    LA size 2.14 cm    Left Atrium Major Axis 4.89 cm    Left Atrium Minor Axis 5.19 cm    LVIDd 3.00 (A) 3.5 - 6.0 cm    LVIDs 1.82 (A) 2.1 - 4.0 cm    LVOT diameter 1.90 cm    LVOT peak VTI 20.53 cm    Posterior Wall 1.10 0.6 - 1.1 cm    MV Peak A Philip 1.36 m/s    E wave deceleration time 466.28 msec    MV Peak E Philip 1.06 m/s    RA Major Axis 4.26 cm    RA Width 2.95 cm    RVDD 2.21 cm    Sinus 3.32 cm    TAPSE 2.08 cm    TR Max Philip 2.39 m/s    TDI LATERAL 0.06 m/s    TDI SEPTAL 0.03 m/s    LA WIDTH 3.80 cm    MV stenosis pressure 1/2 time 135.22 ms    LV Diastolic Volume 42.34 mL    LV Systolic Volume 9.93 mL    RV S' 11.28 cm/s    LVOT peak philip 1.02 m/s    LA volume (mod) 54.10 cm3    MV mean gradient 4 mmHg    MV peak gradient 9 mmHg    MV VTI 33.22 cm    LV LATERAL E/E' RATIO 17.67 m/s    LV SEPTAL E/E' RATIO 35.33 m/s    FS 39 %    LA volume 34.81 cm3    LV mass 82.14 g    Left Ventricle Relative Wall Thickness 0.73 cm    AV valve area 2.06 cm2    AV Velocity Ratio 0.65     AV index (prosthetic) 0.73     MV valve area p 1/2 method 1.63 cm2    MV valve area by continuity eq 1.75 cm2    E/A ratio 0.78     Mean e' 0.05 m/s    LVOT area 2.8 cm2    LVOT stroke volume 58.18 cm3    AV peak gradient 10 mmHg    E/E' ratio 23.56  m/s    Triscuspid Valve Regurgitation Peak Gradient 23 mmHg    BSA 1.43 m2    LV Systolic Volume Index 6.8 mL/m2    LV Diastolic Volume Index 29.00 mL/m2    LA Volume Index 23.8 mL/m2    LV Mass Index 56 g/m2    LA Volume Index (Mod) 37.1 mL/m2    Right Atrial Pressure (from IVC) 3 mmHg    EF 60 %    TV rest pulmonary artery pressure 26 mmHg    Mitral Valve Heart Rate 87 bpm    Narrative    · The left ventricle is normal in size with concentric remodeling and   normal systolic function.  · The estimated ejection fraction is 60%.  · Indeterminate left ventricular diastolic function.  · Normal right ventricular size with normal right ventricular systolic   function.  · There is mild mitral stenosis. Sclerosis and thickening extends in to   the aortomitral curtain.  · The mean diastolic gradient across the mitral valve is 4 mmHg at a heart   rate of 87 bpm.  · Normal central venous pressure (3 mmHg).  · The estimated PA systolic pressure is 26 mmHg.  · Small posterior pericardial effusion.          AMINATA:  No results found for this or any previous visit.    ASSESSMENT/PLAN:           Pre-op Assessment    I have reviewed the Patient Summary Reports.     I have reviewed the Nursing Notes. I have reviewed the NPO Status.   I have reviewed the Medications.     Review of Systems      Physical Exam  General: Well nourished, Cooperative and Alert    Airway:  Mallampati: III / II/ III  Mouth Opening: Small, but > 3cm  TM Distance: 4 - 6 cm  Tongue: Normal  Neck ROM: Normal ROM    Chest/Lungs:  Normal Respiratory Rate, Tachypnea    Heart:  Rate: Normal  Rhythm: Regular Rhythm        Anesthesia Plan  Type of Anesthesia, risks & benefits discussed:    Anesthesia Type: Gen ETT  Intra-op Monitoring Plan: Standard ASA Monitors and Art Line  Post Op Pain Control Plan: IV/PO Opioids PRN  Induction:  IV  Airway Plan: Video, Post-Induction  Informed Consent: Informed consent signed with the Patient and all parties understand the risks and  agree with anesthesia plan.  All questions answered.   ASA Score: 3  Day of Surgery Review of History & Physical: H&P Update referred to the surgeon/provider.    Ready For Surgery From Anesthesia Perspective.     .

## 2022-10-25 NOTE — OP NOTE
SUSSY COSTA  [id number]  [id number]    OPERATIVE NOTE:    DATE OF OPERATION: 10/25/2022    PREOPERATIVE DIAGNOSIS: Large bowel obstruction    POSTOPERATIVE DIAGNOSIS: Large bowel obstruction    PROCEDURE PERFORMED: Right Colectomy     ATTENDING SURGEON: Jass Holman MD    RESIDENT/FELLOW SURGEON: Jim Smith MD    ANESTHESIA: GETA    ESTIMATED BLOOD LOSS: 100 mL    FINDINGS:  1. Hepatic flexure mass with dilated ascending colon.     SPECIMENS:   Right colon    COMPLICATIONS:none    DISPOSITION: PACU    CONDITION: good    INDICATION FOR PROCEDURE:  Sussy Costa is a 87 y.o. female who presented with abdominal pain and distention for 1 day.  CT scan showed a 10cm ascending colon with decompressed colon distal to the hepatic flexure, suggestion of a mass at the hepatic flexure, and no small bowel dilation.     DESCRIPTION OF PROCEDURE:   After obtaining informed consent the patient was taken the operating room and placed in the supine position.  She underwent general endotracheal anesthesia and a Guevara catheter was placed.  She was padded and secured appropriately for open abdominal surgery and then prepped and draped in sterile manner.  A preoperative time-out was performed.    A midline incision was made from the symphysis pubis up to the mid epigastric region.  The midline fascia was entered sharply and adhesions of the small bowel to the abdominal wall were taken down sharply.  There was a small amount of ascites present and the ascending colon was tense and dilated but without ischemia.  An Brad wound protector was placed.  The right colon was mobilized by taking down its lateral attachments the right colon was taken down by dividing its lateral attachments.  There was some inflammatory changes along the white line of Toldt and for that reason the excision was extended lateral to this.  There were several loops of small bowel adherent in the pelvis that were taken down sharply.  With a right  colon completely mobilized the ileocolic vessels were then isolated and divided with a LigaSure.  The ileum was then transected proximally 15 cm from the ileocolic junction and the mesentery to this portion of the small bowel was divided with the LigaSure.  The transverse colon was divided with a separate firing of the linear cutting stapler just proximal to the middle colic vessels.  The right branch of the middle colic was divided with the LigaSure along with the remaining colon mesentery between this area and were the ileocolic vessels had been divided.  With the specimen having been removed we then proceeded to a side-to-side antiperistaltic ileocolic anastomosis using a separate firing of the linear cutting stapler.  The common enterotomy was closed with 3-0 PDS suture in a Haroon fashion followed by interrupted 3-0 Vicryl in an interrupted Lembert fashion.   Two crotch stitches were placed at the distal end of the anastomosis.      Hemostasis was ensured.  The abdomen was irrigated clear.  A tap block with 0.25% Marcaine was performed.  We then changed to clean gowns gloves and instruments.  Sponge, needle, and instrument counts were reported correct.  The midline fascia was closed with 1. PDS in running fashion.  Subcutaneous fat was irrigated skin was then closed with subcuticular 4-0 Monocryl.  Dermabond was applied.  The patient tolerated procedure well was taken recovery room in stable condition.    Colon Resection  Operation performed with curative intent Yes   Tumor Location Hepatic flexure   Extent of colon and vascular resection Right hemicolectomy - ileocolic, right colic (if present)           Jass Holman MD  , Colon & Rectal Surgery

## 2022-10-25 NOTE — ANESTHESIA POSTPROCEDURE EVALUATION
Anesthesia Post Evaluation    Patient: Sussy Costa    Procedure(s) Performed: Procedure(s) (LRB):  COLECTOMY, RIGHT (Right)    Final Anesthesia Type: general      Patient location during evaluation: PACU  Patient participation: Yes- Able to Participate  Level of consciousness: awake and alert  Post-procedure vital signs: reviewed and stable  Pain management: adequate  Airway patency: patent    PONV status at discharge: No PONV  Anesthetic complications: no      Cardiovascular status: blood pressure returned to baseline  Respiratory status: unassisted, spontaneous ventilation and room air  Hydration status: euvolemic            Vitals Value Taken Time   /58 10/25/22 1430   Temp 36.6 °C (97.9 °F) 10/25/22 1315   Pulse 93 10/25/22 1451   Resp 17 10/25/22 1451   SpO2 94 % 10/25/22 1451   Vitals shown include unvalidated device data.      Event Time   Out of Recovery 11:50:00         Pain/Caitie Score: Pain Rating Prior to Med Admin: 5 (10/25/2022  1:45 PM)  Pain Rating Post Med Admin: 5 (10/25/2022 12:15 PM)  Caitie Score: 9 (10/25/2022 11:50 AM)

## 2022-10-26 PROBLEM — J98.11 ATELECTASIS: Status: ACTIVE | Noted: 2022-10-26

## 2022-10-26 PROBLEM — E87.1 HYPONATREMIA: Status: ACTIVE | Noted: 2022-10-26

## 2022-10-26 PROBLEM — J45.40 ASTHMA, MODERATE PERSISTENT: Status: ACTIVE | Noted: 2022-10-26

## 2022-10-26 LAB
ANION GAP SERPL CALC-SCNC: 7 MMOL/L (ref 8–16)
BASOPHILS # BLD AUTO: 0.03 K/UL (ref 0–0.2)
BASOPHILS NFR BLD: 0.2 % (ref 0–1.9)
BUN SERPL-MCNC: 14 MG/DL (ref 8–23)
CALCIUM SERPL-MCNC: 7.1 MG/DL (ref 8.7–10.5)
CHLORIDE SERPL-SCNC: 102 MMOL/L (ref 95–110)
CO2 SERPL-SCNC: 24 MMOL/L (ref 23–29)
CREAT SERPL-MCNC: 0.7 MG/DL (ref 0.5–1.4)
DIFFERENTIAL METHOD: ABNORMAL
EOSINOPHIL # BLD AUTO: 0 K/UL (ref 0–0.5)
EOSINOPHIL NFR BLD: 0 % (ref 0–8)
ERYTHROCYTE [DISTWIDTH] IN BLOOD BY AUTOMATED COUNT: 14 % (ref 11.5–14.5)
EST. GFR  (NO RACE VARIABLE): >60 ML/MIN/1.73 M^2
GLUCOSE SERPL-MCNC: 352 MG/DL (ref 70–110)
HCT VFR BLD AUTO: 38.4 % (ref 37–48.5)
HGB BLD-MCNC: 12 G/DL (ref 12–16)
IMM GRANULOCYTES # BLD AUTO: 0.06 K/UL (ref 0–0.04)
IMM GRANULOCYTES NFR BLD AUTO: 0.4 % (ref 0–0.5)
LYMPHOCYTES # BLD AUTO: 0.9 K/UL (ref 1–4.8)
LYMPHOCYTES NFR BLD: 5.7 % (ref 18–48)
MAGNESIUM SERPL-MCNC: 1.8 MG/DL (ref 1.6–2.6)
MCH RBC QN AUTO: 29 PG (ref 27–31)
MCHC RBC AUTO-ENTMCNC: 31.3 G/DL (ref 32–36)
MCV RBC AUTO: 93 FL (ref 82–98)
MONOCYTES # BLD AUTO: 1.1 K/UL (ref 0.3–1)
MONOCYTES NFR BLD: 7.2 % (ref 4–15)
NEUTROPHILS # BLD AUTO: 13.6 K/UL (ref 1.8–7.7)
NEUTROPHILS NFR BLD: 86.5 % (ref 38–73)
NRBC BLD-RTO: 0 /100 WBC
PHOSPHATE SERPL-MCNC: 2.4 MG/DL (ref 2.7–4.5)
PLATELET # BLD AUTO: 239 K/UL (ref 150–450)
PMV BLD AUTO: 11.7 FL (ref 9.2–12.9)
POCT GLUCOSE: 185 MG/DL (ref 70–110)
POCT GLUCOSE: 280 MG/DL (ref 70–110)
POCT GLUCOSE: 300 MG/DL (ref 70–110)
POCT GLUCOSE: 332 MG/DL (ref 70–110)
POCT GLUCOSE: 336 MG/DL (ref 70–110)
POTASSIUM SERPL-SCNC: 3.9 MMOL/L (ref 3.5–5.1)
RBC # BLD AUTO: 4.14 M/UL (ref 4–5.4)
SODIUM SERPL-SCNC: 133 MMOL/L (ref 136–145)
WBC # BLD AUTO: 15.66 K/UL (ref 3.9–12.7)

## 2022-10-26 PROCEDURE — 20600001 HC STEP DOWN PRIVATE ROOM

## 2022-10-26 PROCEDURE — 97535 SELF CARE MNGMENT TRAINING: CPT

## 2022-10-26 PROCEDURE — 80048 BASIC METABOLIC PNL TOTAL CA: CPT | Performed by: STUDENT IN AN ORGANIZED HEALTH CARE EDUCATION/TRAINING PROGRAM

## 2022-10-26 PROCEDURE — 97530 THERAPEUTIC ACTIVITIES: CPT

## 2022-10-26 PROCEDURE — 97166 OT EVAL MOD COMPLEX 45 MIN: CPT

## 2022-10-26 PROCEDURE — 27000635 HC IPV DISPOSABLE BREATHING CIRCUIT

## 2022-10-26 PROCEDURE — 85025 COMPLETE CBC W/AUTO DIFF WBC: CPT | Performed by: STUDENT IN AN ORGANIZED HEALTH CARE EDUCATION/TRAINING PROGRAM

## 2022-10-26 PROCEDURE — 25000242 PHARM REV CODE 250 ALT 637 W/ HCPCS: Performed by: STUDENT IN AN ORGANIZED HEALTH CARE EDUCATION/TRAINING PROGRAM

## 2022-10-26 PROCEDURE — 63600175 PHARM REV CODE 636 W HCPCS

## 2022-10-26 PROCEDURE — 27000644 HC VENT IN-LINE ADAPTER

## 2022-10-26 PROCEDURE — 25000242 PHARM REV CODE 250 ALT 637 W/ HCPCS: Performed by: INTERNAL MEDICINE

## 2022-10-26 PROCEDURE — 63600175 PHARM REV CODE 636 W HCPCS: Performed by: STUDENT IN AN ORGANIZED HEALTH CARE EDUCATION/TRAINING PROGRAM

## 2022-10-26 PROCEDURE — 25000242 PHARM REV CODE 250 ALT 637 W/ HCPCS

## 2022-10-26 PROCEDURE — 94640 AIRWAY INHALATION TREATMENT: CPT

## 2022-10-26 PROCEDURE — 25000003 PHARM REV CODE 250: Performed by: STUDENT IN AN ORGANIZED HEALTH CARE EDUCATION/TRAINING PROGRAM

## 2022-10-26 PROCEDURE — 83735 ASSAY OF MAGNESIUM: CPT | Performed by: STUDENT IN AN ORGANIZED HEALTH CARE EDUCATION/TRAINING PROGRAM

## 2022-10-26 PROCEDURE — 99223 1ST HOSP IP/OBS HIGH 75: CPT | Mod: ,,, | Performed by: INTERNAL MEDICINE

## 2022-10-26 PROCEDURE — 99900035 HC TECH TIME PER 15 MIN (STAT)

## 2022-10-26 PROCEDURE — 36415 COLL VENOUS BLD VENIPUNCTURE: CPT | Performed by: STUDENT IN AN ORGANIZED HEALTH CARE EDUCATION/TRAINING PROGRAM

## 2022-10-26 PROCEDURE — 84100 ASSAY OF PHOSPHORUS: CPT | Performed by: STUDENT IN AN ORGANIZED HEALTH CARE EDUCATION/TRAINING PROGRAM

## 2022-10-26 PROCEDURE — 94799 UNLISTED PULMONARY SVC/PX: CPT

## 2022-10-26 PROCEDURE — 63600175 PHARM REV CODE 636 W HCPCS: Performed by: INTERNAL MEDICINE

## 2022-10-26 PROCEDURE — 27000221 HC OXYGEN, UP TO 24 HOURS

## 2022-10-26 PROCEDURE — 63700000 PHARM REV CODE 250 ALT 637 W/O HCPCS: Performed by: INTERNAL MEDICINE

## 2022-10-26 PROCEDURE — 94668 MNPJ CHEST WALL SBSQ: CPT

## 2022-10-26 PROCEDURE — 99223 PR INITIAL HOSPITAL CARE,LEVL III: ICD-10-PCS | Mod: ,,, | Performed by: INTERNAL MEDICINE

## 2022-10-26 PROCEDURE — 99900026 HC AIRWAY MAINTENANCE (STAT)

## 2022-10-26 PROCEDURE — 97162 PT EVAL MOD COMPLEX 30 MIN: CPT

## 2022-10-26 PROCEDURE — 99900031 HC PATIENT EDUCATION (STAT)

## 2022-10-26 PROCEDURE — 94761 N-INVAS EAR/PLS OXIMETRY MLT: CPT

## 2022-10-26 RX ORDER — ALBUTEROL SULFATE 2.5 MG/.5ML
2.5 SOLUTION RESPIRATORY (INHALATION) EVERY 4 HOURS PRN
Status: DISCONTINUED | OUTPATIENT
Start: 2022-10-26 | End: 2022-10-26

## 2022-10-26 RX ORDER — HYDROMORPHONE HYDROCHLORIDE 1 MG/ML
0.3 INJECTION, SOLUTION INTRAMUSCULAR; INTRAVENOUS; SUBCUTANEOUS
Status: DISCONTINUED | OUTPATIENT
Start: 2022-10-26 | End: 2022-10-27

## 2022-10-26 RX ORDER — OXYCODONE HYDROCHLORIDE 5 MG/1
5 TABLET ORAL ONCE
Status: COMPLETED | OUTPATIENT
Start: 2022-10-26 | End: 2022-10-26

## 2022-10-26 RX ORDER — SODIUM CHLORIDE, SODIUM LACTATE, POTASSIUM CHLORIDE, CALCIUM CHLORIDE 600; 310; 30; 20 MG/100ML; MG/100ML; MG/100ML; MG/100ML
INJECTION, SOLUTION INTRAVENOUS CONTINUOUS
Status: DISCONTINUED | OUTPATIENT
Start: 2022-10-26 | End: 2022-10-27

## 2022-10-26 RX ORDER — SODIUM CHLORIDE FOR INHALATION 3 %
4 VIAL, NEBULIZER (ML) INHALATION EVERY 6 HOURS
Status: DISCONTINUED | OUTPATIENT
Start: 2022-10-26 | End: 2022-11-02 | Stop reason: HOSPADM

## 2022-10-26 RX ORDER — AZITHROMYCIN 250 MG/1
500 TABLET, FILM COATED ORAL DAILY
Status: DISCONTINUED | OUTPATIENT
Start: 2022-10-26 | End: 2022-11-01

## 2022-10-26 RX ORDER — IPRATROPIUM BROMIDE AND ALBUTEROL SULFATE 2.5; .5 MG/3ML; MG/3ML
SOLUTION RESPIRATORY (INHALATION)
Status: COMPLETED
Start: 2022-10-26 | End: 2022-10-30

## 2022-10-26 RX ORDER — IPRATROPIUM BROMIDE AND ALBUTEROL SULFATE 2.5; .5 MG/3ML; MG/3ML
3 SOLUTION RESPIRATORY (INHALATION) EVERY 4 HOURS
Status: DISCONTINUED | OUTPATIENT
Start: 2022-10-26 | End: 2022-11-02 | Stop reason: HOSPADM

## 2022-10-26 RX ADMIN — METOROPROLOL TARTRATE 5 MG: 5 INJECTION, SOLUTION INTRAVENOUS at 05:10

## 2022-10-26 RX ADMIN — INSULIN ASPART 3 UNITS: 100 INJECTION, SOLUTION INTRAVENOUS; SUBCUTANEOUS at 12:10

## 2022-10-26 RX ADMIN — HYDROMORPHONE HYDROCHLORIDE 0.3 MG: 1 INJECTION, SOLUTION INTRAMUSCULAR; INTRAVENOUS; SUBCUTANEOUS at 03:10

## 2022-10-26 RX ADMIN — METOROPROLOL TARTRATE 5 MG: 5 INJECTION, SOLUTION INTRAVENOUS at 12:10

## 2022-10-26 RX ADMIN — LATANOPROST 1 DROP: 50 SOLUTION OPHTHALMIC at 09:10

## 2022-10-26 RX ADMIN — INSULIN DETEMIR 3 UNITS: 100 INJECTION, SOLUTION SUBCUTANEOUS at 10:10

## 2022-10-26 RX ADMIN — HYDROMORPHONE HYDROCHLORIDE 0.3 MG: 1 INJECTION, SOLUTION INTRAMUSCULAR; INTRAVENOUS; SUBCUTANEOUS at 10:10

## 2022-10-26 RX ADMIN — METOROPROLOL TARTRATE 5 MG: 5 INJECTION, SOLUTION INTRAVENOUS at 06:10

## 2022-10-26 RX ADMIN — IPRATROPIUM BROMIDE AND ALBUTEROL SULFATE 3 ML: 2.5; .5 SOLUTION RESPIRATORY (INHALATION) at 08:10

## 2022-10-26 RX ADMIN — FLUTICASONE FUROATE AND VILANTEROL TRIFENATATE 1 PUFF: 200; 25 POWDER RESPIRATORY (INHALATION) at 10:10

## 2022-10-26 RX ADMIN — IPRATROPIUM BROMIDE AND ALBUTEROL SULFATE 3 ML: 2.5; .5 SOLUTION RESPIRATORY (INHALATION) at 03:10

## 2022-10-26 RX ADMIN — ATORVASTATIN CALCIUM 20 MG: 20 TABLET, FILM COATED ORAL at 09:10

## 2022-10-26 RX ADMIN — HYDROMORPHONE HYDROCHLORIDE 0.3 MG: 1 INJECTION, SOLUTION INTRAMUSCULAR; INTRAVENOUS; SUBCUTANEOUS at 02:10

## 2022-10-26 RX ADMIN — SODIUM CHLORIDE SOLN NEBU 3% 4 ML: 3 NEBU SOLN at 11:10

## 2022-10-26 RX ADMIN — SODIUM CHLORIDE SOLN NEBU 3% 4 ML: 3 NEBU SOLN at 08:10

## 2022-10-26 RX ADMIN — INSULIN ASPART 4 UNITS: 100 INJECTION, SOLUTION INTRAVENOUS; SUBCUTANEOUS at 06:10

## 2022-10-26 RX ADMIN — HYDROMORPHONE HYDROCHLORIDE 0.2 MG: 1 INJECTION, SOLUTION INTRAMUSCULAR; INTRAVENOUS; SUBCUTANEOUS at 04:10

## 2022-10-26 RX ADMIN — MONTELUKAST 10 MG: 10 TABLET, FILM COATED ORAL at 09:10

## 2022-10-26 RX ADMIN — AZITHROMYCIN MONOHYDRATE 500 MG: 250 TABLET ORAL at 12:10

## 2022-10-26 RX ADMIN — HYDROMORPHONE HYDROCHLORIDE 0.3 MG: 1 INJECTION, SOLUTION INTRAMUSCULAR; INTRAVENOUS; SUBCUTANEOUS at 07:10

## 2022-10-26 RX ADMIN — SODIUM CHLORIDE, SODIUM LACTATE, POTASSIUM CHLORIDE, AND CALCIUM CHLORIDE: .6; .31; .03; .02 INJECTION, SOLUTION INTRAVENOUS at 02:10

## 2022-10-26 RX ADMIN — CEFTRIAXONE 2 G: 2 INJECTION, SOLUTION INTRAVENOUS at 02:10

## 2022-10-26 RX ADMIN — OXYCODONE 5 MG: 5 TABLET ORAL at 05:10

## 2022-10-26 RX ADMIN — IPRATROPIUM BROMIDE AND ALBUTEROL SULFATE 3 ML: 2.5; .5 SOLUTION RESPIRATORY (INHALATION) at 11:10

## 2022-10-26 RX ADMIN — METOROPROLOL TARTRATE 5 MG: 5 INJECTION, SOLUTION INTRAVENOUS at 11:10

## 2022-10-26 RX ADMIN — INSULIN DETEMIR 3 UNITS: 100 INJECTION, SOLUTION SUBCUTANEOUS at 09:10

## 2022-10-26 RX ADMIN — ONDANSETRON 4 MG: 2 INJECTION INTRAMUSCULAR; INTRAVENOUS at 08:10

## 2022-10-26 RX ADMIN — ALBUTEROL SULFATE 2.5 MG: 2.5 SOLUTION RESPIRATORY (INHALATION) at 10:10

## 2022-10-26 NOTE — PLAN OF CARE
Medicated twice for pain and effective . Belching but not passing flatus . SR on Telemetry . UOP marginal . New PIV started . ContIV . Continues to have some upper lobe expiratory wheeze . NC at 2 liters currently . . Educated on safety , pain management , IS and plan of care

## 2022-10-26 NOTE — PT/OT/SLP EVAL
Occupational Therapy   Evaluation    Name: Sussy Costa  MRN: [id number]  Admitting Diagnosis:  <principal problem not specified>  Recent Surgery: Procedure(s) (LRB):  COLECTOMY, RIGHT (Right) 1 Day Post-Op    Recommendations:     Discharge Recommendations: home health PT, home health OT  Discharge Equipment Recommendations:  shower chair  Barriers to discharge:   (Pt currently requires increased assistance)    Assessment:     Sussy Costa is a 87 y.o. female with a medical diagnosis of <principal problem not specified>.  She presents with pain at surgical site, limiting tolerance for OOB activity. Performance deficits affecting function: weakness, impaired endurance, impaired self care skills, impaired functional mobility, gait instability, impaired balance, impaired skin, impaired cardiopulmonary response to activity, pain.      Rehab Prognosis: Good; patient would benefit from acute skilled OT services to address these deficits and reach maximum level of function.       Plan:     Patient to be seen 3 x/week to address the above listed problems via self-care/home management, therapeutic activities, therapeutic exercises  Plan of Care Expires: 11/26/22  Plan of Care Reviewed with: patient    Subjective     Chief Complaint: Pt reports pain in abdomen 8/10, having already received pain medications ~30 minutes prior  Patient/Family Comments/goals: Pain relief, to be able to return home and to PLOF    Occupational Profile:  Living Environment: Pt lives with spouse in [facility], [facility], tub/  Previous level of function: Indep ADLs and functional mobility   Roles and Routines: Caretaker to self and home. Cooks, cleans, drives, completes own grocery shopping occasionally but reports spouse does bulk of shopping. Pt states she enjoys needlepoint, cross stitch, and sewing   Equipment Used at Home:  none  Assistance upon Discharge: Family    Pain/Comfort:  Pain Rating 1: 9/10  Location - Orientation 1: generalized  Location 1:  abdomen  Pain Addressed 1: Reposition, Distraction, Cessation of Activity, Nurse notified, Pre-medicate for activity  Pain Rating Post-Intervention 1: 8/10    Patients cultural, spiritual, Christian conflicts given the current situation:      Objective:     Communicated with: nsginger prior to session.  Patient found HOB elevated with peripheral IV, telemetry, oxygen upon OT entry to room.    General Precautions: Standard, fall   Orthopedic Precautions:N/A   Braces: N/A    Occupational Performance:    Bed Mobility:    Patient completed Rolling/Turning to Left with  stand by assistance  Patient completed Scooting/Bridging with stand by assistance  Patient completed Supine to Sit with stand by assistance    Functional Mobility/Transfers:  Patient completed Sit <> Stand Transfer with contact guard assistance  with  hand-held assist   Patient completed Bed <> Chair Transfer using Step Transfer technique with contact guard assistance with hand-held assist  Patient completed Toilet Transfer Step Transfer technique with minimum assistance to stand from toilet with  hand-held assist; pt reported fear of increased pain and OT assisted to roll pt from bed to bathroom in bedside chair as there was no BSC available to pt and pain already 8-9/10  Functional Mobility: Pt with fair dynamic seated and standing balance.     Activities of Daily Living:  Toileting: stand by assistance for pericare, no clothing management required at this time    Cognitive/Visual Perceptual:  Cognitive/Psychosocial Skills:     -       Oriented to: Person, Place, Time and Situation   -       Follows Commands/attention:Follows multistep  commands  -       Communication: clear/fluent  -       Memory: No Deficits noted  -       Safety awareness/insight to disability: intact   -       Mood/Affect/Coping skills/emotional control: Appropriate to situation    Physical Exam:  Postural examination/scapula alignment:    -       No postural abnormalities  identified  Skin integrity: Surgical wound not visualized this session  Sensation:    -       Intact  Motor Planning:    -       WFL  Dominant hand:    -       R handed  Upper Extremity Range of Motion: BUE WFL     Upper Extremity Strength:  BUE grossly 3+ to 4-/5   Strength:  B hands WFL  Fine Motor Coordination:    -       Intact  Gross motor coordination:   WFL     AMPAC 6 Click ADL:  AMPAC Total Score: 19    Treatment & Education:  Pt educated on role of OT and POC.   Pt performing skills as listed above.     Patient left up in chair with all lines intact, call button in reach, and nsg notified    GOALS:   Multidisciplinary Problems       Occupational Therapy Goals          Problem: Occupational Therapy    Goal Priority Disciplines Outcome Interventions   Occupational Therapy Goal     OT, PT/OT Ongoing, Progressing    Description: Goals to be met by: 11/26/2022      Patient will increase functional independence with ADLs by performing:    UE Dressing with Modified [place].  LE Dressing with Stand-by Assistance.  Grooming while standing with Supervision.  Toileting from bedside commode with Supervision for hygiene and clothing management.   Toilet transfer to bedside commode with Supervision.  Increased functional strength to WF for self care.  Upper extremity exercise program x10 reps per handout, with supervision.                          History:     Past Medical History:   Diagnosis Date    Asthma     Cyst of pancreas     liver    Diabetes mellitus type II     Eczema of hand     Glaucoma     Hyperlipidemia     Hypertension     Lichen planus     gums    Osteoporosis     Pulmonary nodules          Past Surgical History:   Procedure Laterality Date    BRONCHOSCOPY N/A 5/13/2022    Procedure: Bronchoscopy;  Surgeon: Tina Diagnostic Provider;  Location: [facility] OR [address] [place] [address];  Service: Anesthesiology;  Laterality: N/A;    CATARACT EXTRACTION, BILATERAL      CHOLECYSTECTOMY      COLECTOMY, RIGHT Right  10/25/2022    Procedure: COLECTOMY, RIGHT;  Surgeon: Jass Holman MD;  Location: [facility] OR 2ND FLR;  Service: Colon and Rectal;  Laterality: Right;    ENDOSCOPIC ULTRASOUND OF UPPER GASTROINTESTINAL TRACT N/A 4/22/2022    Procedure: ULTRASOUND, UPPER GI TRACT, ENDOSCOPIC;  Surgeon: Max Rosado MD;  Location: [facility] ENDO (2ND FLR);  Service: Endoscopy;  Laterality: N/A;  urgent EUS (linear) for abnormal CT scan with dilated PD and increased cyst of pancreas cyst.  pap 44 mmHg  4/13:fully vaccinated. instructions via portal-[facility]    EPIDURAL STEROID INJECTION INTO LUMBAR SPINE N/A 11/8/2018    Procedure: Injection-steroid-epidural-lumbar L5-S1;  Surgeon: Nicci Osorio Jr., MD;  Location: [facility] PAIN MGT;  Service: Pain Management;  Laterality: N/A;    FUNCTIONAL ENDOSCOPIC SINUS SURGERY (FESS) USING COMPUTER-ASSISTED NAVIGATION N/A 6/17/2019    Procedure: FESS, USING COMPUTER-ASSISTED NAVIGATION;  Surgeon: Rosangela Isabel MD;  Location: [facility] OR;  Service: ENT;  Laterality: N/A;  video    HYSTERECTOMY      nasal polyps         Time Tracking:     OT Date of Treatment: 10/26/22  OT Start Time: 1423  OT Stop Time: 1453  OT Total Time (min): 30 min    Billable Minutes:Evaluation 15  Self Care/Home Management 15    10/26/2022

## 2022-10-26 NOTE — PLAN OF CARE
Problem: Physical Therapy  Goal: Physical Therapy Goal  Description: Goals to met by 11/9/2022    1. Supine to sit with Modified [place]  2. Sit to supine with Modified [place]  3. Rolling to Left and Right with Modified [place].  4. Sit to stand transfer with Supervision  5. Bed to chair transfer with Supervision using LRAD  6. Gait  x 80 feet with Stand-by Assistance using LRAD   7. Stand for 10 minutes with Supervision using No Assistive Device  8. Lower extremity exercise program x15 reps per Instruction, with assistance as needed in order to facilitate improved strength, improved postural control, and improvement in functional independence    PT Eval: 10/26/2022

## 2022-10-26 NOTE — ASSESSMENT & PLAN NOTE
Patient with a borderline sodium on admission now down to 133.  Would recommend stopping D5 half-normal saline especially given her diabetes and start a p.o. diet and also if necessary change fluids to lactated Ringer's or normal saline.

## 2022-10-26 NOTE — ASSESSMENT & PLAN NOTE
Continue DuoNebs and Laba/ ics, and in addition start LAMA scheduled  Given possible risk of aspiration will start antibiotics ceftriaxone and Zithromax for now.  Patient has a penicillin allergy but is able to take ceftriaxone.

## 2022-10-26 NOTE — PT/OT/SLP EVAL
Physical Therapy Evaluation and Treatment    Patient Name:  Sussy Costa   MRN:  [id number]  Admit Date: 10/25/2022  Admitting Diagnosis:  atelectasias    Length of Stay: 1 days  Recent Surgery: Procedure(s) (LRB):  COLECTOMY, RIGHT (Right) 1 Day Post-Op    Recommendations:     Discharge Recommendations:  nursing facility, skilled (may progress to home health)   Discharge Equipment Recommendations: shower chair   Barriers to discharge: Evolving Clinical Presentation    Assessment:     Sussy Costa is a 87 y.o. female admitted with a medical diagnosis of atelectasias .  She presents with the following impairments/functional limitations: weakness, impaired functional mobility, pain, gait instability, impaired endurance, impaired balance, decreased lower extremity function, impaired self care skills.     Pt agreeable to therapy despite pain, apprehensive of movement at this time. Pt sits EOB from supine with HOB elevated, SBA, V/Cs provided for technique to prevent increase in pain. Pt stands from EOB with CGA, unable to maintain standing for more than 1 minute due to pain, and does not feel comfortable ambulating at this time, she manages to take one step forward and back before needing to sit again. CGA for sit to supine and mod A provided to scoot to HOB via bridge. Treatment tolerated fair, mostly limited by fear of movement and pain. Continue to progress mobility as able.    Rehab Prognosis: Fair; patient would benefit from acute skilled PT services to address these deficits and reach maximum level of function.      Highest level of mobility achieved this visit: standing EOB with CGA    Activity with RN/PCT: bed mobility only    Plan:     During this hospitalization, patient to be seen 4 x/week to address the identified rehab impairments via gait training, therapeutic activities, therapeutic exercises, neuromuscular re-education and progress towards the established goals.    Plan of Care Expires:   "11/25/22    Subjective     RN An notified prior to session. Pt's  present upon PT entrance into room.    Chief Complaint: abdominal pain  Patient/Family Comments/goals: "I just don't know if I can do this right now"  Pain/Comfort:  Pain Rating 1: 10/10  Location - Orientation 1: generalized  Location 1: abdomen  Pain Addressed 1: Reposition, Distraction, Cessation of Activity    Social History:  Residence: lives with their spouse 1-story house with 0 JAMESON.  Support available: family  Equipment Used: none  Equipment Owned (not using): Walkers, Type: Rolling Walker  Prior level of function: independent  Work: Retired.   Drive: yes.       Objective:     Additional staff present: none    Patient found HOB elevated with: peripheral IV, oxygen, ca catheter     General Precautions: Standard, Cardiac fall   Orthopedic Precautions:N/A   Braces: N/A   Body mass index is 17.31 kg/m².  Oxygen Device: Nasal Cannula 4L    Vitals: /64 (Patient Position: Lying)   Pulse 89   Temp 98 °F (36.7 °C) (Oral)   Resp 18   Ht 5' 5" (1.651 m)   Wt 47.2 kg (104 lb)   SpO2 (!) 92%   Breastfeeding No   BMI 17.31 kg/m²     Exams:  Cognition:   Alert and Cooperative  Command following: Follows two-step verbal commands  Fluency: clear/fluent  Hearing: Intact  Vision:  Intact  Skin Integrity: Visible skin intact    Physical Exam:   Edema - None noted  ROM - RAÚL LEs WFL  Strength - hips 4/5, knees 4+/5, ankles 4+/5   Sensation - Intact to light touch  Coordination - No deficits noted    Outcome Measures:    AM-PAC 6 CLICK MOBILITY  Turning over in bed (including adjusting bedclothes, sheets and blankets)?: 3  Sitting down on and standing up from a chair with arms (e.g., wheelchair, bedside commode, etc.): 3  Moving from lying on back to sitting on the side of the bed?: 3  Moving to and from a bed to a chair (including a wheelchair)?: 3  Need to walk in hospital room?: 2  Climbing 3-5 steps with a railing?: 1  Basic Mobility " Total Score: 15     Functional Mobility:    Bed Mobility:   Scooting to HOB via supine bridge: moderate assistance  Supine to Sit: stand by assistance; from L side of bed  Scooting anteriorly to EOB to have both feet planted on floor: stand by assistance  Sit to Supine: contact guard assistance; to R side of bed    Sitting Balance at Edge of Bed:  Assistance Level Required: Supervision  Time: 12 min  Postural deviations noted: no deviations noted    Transfers:   Sit <> Stand Transfer: contact guard assistance with no assistive device  Stand <> Sit Transfer: contact guard assistance with no assistive device  [id number] from EOB    Standing Balance:  Assistance Level Required: Contact Guard Assistance  Patient used: no assistive device  Time: 45 sec + 30 sec  Postural deviations noted: flexed at hips  Encouraged: upright stance to stretch abdominal restrictions  Comments: limited by pain      Gait:   Patient ambulated: single step forward and back   Patient required: contact guard  Patient used:  no assistive device  Impairments due to: pain    Education:  Time provided for education, counseling and discussion of health disposition in regards to patient's current status  All questions answered within PT scope of practice and to patient's satisfaction  PT role in POC to address current functional deficits  Pt educated on proper body mechanics, safety techniques, and energy conservation with PT facilitation and cueing throughout session    Patient left HOB elevated with all lines intact, call button in reach, and  present.    GOALS:   Multidisciplinary Problems       Physical Therapy Goals          Problem: Physical Therapy    Goal Priority Disciplines Outcome Goal Variances Interventions   Physical Therapy Goal     PT, PT/OT Ongoing, Progressing     Description: Goals to met by 11/9/2022    1. Supine to sit with Modified [place]  2. Sit to supine with Modified [place]  3. Rolling to Left and Right with  Modified [place].  4. Sit to stand transfer with Supervision  5. Bed to chair transfer with Supervision using LRAD  6. Gait  x 80 feet with Stand-by Assistance using LRAD   7. Stand for 10 minutes with Supervision using No Assistive Device  8. Lower extremity exercise program x15 reps per Instruction, with assistance as needed in order to facilitate improved strength, improved postural control, and improvement in functional independence                         History:     Past Medical History:   Diagnosis Date    Asthma     Cyst of pancreas     liver    Diabetes mellitus type II     Eczema of hand     Glaucoma     Hyperlipidemia     Hypertension     Lichen planus     gums    Osteoporosis     Pulmonary nodules        Past Surgical History:   Procedure Laterality Date    BRONCHOSCOPY N/A 5/13/2022    Procedure: Bronchoscopy;  Surgeon: Tina Diagnostic Provider;  Location: [facility] OR [address] [place] [address];  Service: Anesthesiology;  Laterality: N/A;    CATARACT EXTRACTION, BILATERAL      CHOLECYSTECTOMY      COLECTOMY, RIGHT Right 10/25/2022    Procedure: COLECTOMY, RIGHT;  Surgeon: Jass Holman MD;  Location: [facility] OR [address] [place] [address];  Service: Colon and Rectal;  Laterality: Right;    ENDOSCOPIC ULTRASOUND OF UPPER GASTROINTESTINAL TRACT N/A 4/22/2022    Procedure: ULTRASOUND, UPPER GI TRACT, ENDOSCOPIC;  Surgeon: Max Rosado MD;  Location: [facility] ENDO ([address] [place] [address]);  Service: Endoscopy;  Laterality: N/A;  urgent EUS (linear) for abnormal CT scan with dilated PD and increased cyst of pancreas cyst.  pap 44 mmHg  4/13:fully vaccinated. instructions via portal-[facility]    EPIDURAL STEROID INJECTION INTO LUMBAR SPINE N/A 11/8/2018    Procedure: Injection-steroid-epidural-lumbar L5-S1;  Surgeon: Nicci Osorio Jr., MD;  Location: [facility];  Service: Pain Management;  Laterality: N/A;    FUNCTIONAL ENDOSCOPIC SINUS SURGERY (FESS) USING COMPUTER-ASSISTED NAVIGATION N/A 6/17/2019    Procedure: FESS, USING COMPUTER-ASSISTED NAVIGATION;   Surgeon: Rosangela Isabel MD;  Location: [facility];  Service: ENT;  Laterality: N/A;  video    HYSTERECTOMY      nasal polyps         Time Tracking:     PT Received On: 10/26/22  PT Start Time: 1324     PT Stop Time: 1344  PT Total Time (min): 20 min     Billable Minutes: Evaluation 1 procedure and Therapeutic Activity 10 min    Manuelito Rahman, PT, DPT  10/26/2022

## 2022-10-26 NOTE — CARE UPDATE
10/1935   Patient Assessment/Suction   Level of Consciousness (AVPU) alert   Respiratory Effort Unlabored   Rhythm/Pattern, Respiratory no shortness of breath reported   Cough Frequency no cough   Skin Integrity   $ Wound Care Tech Time 15 min   Area Observed Left;Right;Behind ear   Skin Appearance without discoloration   Barrier used?   (None)   Barrier Changed? No   PRE-TX-O2   O2 Device (Oxygen Therapy) nasal cannula   $ Is the patient on Low Flow Oxygen? Yes   Flow (L/min) 2   SpO2 98 %   Pulse Oximetry Type Intermittent   $ Pulse Oximetry - Multiple Charge Pulse Oximetry - Multiple   Positioning HOB elevated 30 degrees   Chest Physiotherapy   $ Chest Physiotherapy Charges Initial   Type (CPT) percussion   Method (CPT) by hand   Patient Position (CPT) HOB elevated   Lung Fields (CPT) Anterior:;CHASTITY (left upper lobe);RUL (right upper lobe)   CPT Total Time (Min) 10   Signs of Intolerance (CPT) none   General Safety Checklist   Safety Promotion/Fall Prevention side rails raised   Airway Safety   Ambu bag with the patient? Yes, Adult Ambu   Is mask with the patient? Yes, Adult Mask      no

## 2022-10-26 NOTE — ASSESSMENT & PLAN NOTE
87-year-old lady with abdominal surgery yesterday and a history of asthma and mucus plugging in the past who has worsening shortness of breath and chest x-ray consistent with atelectasis.  We will start scheduled nebulizer treatments with DuoNebs and 3% saline and in addition continue chest PT and or IPV.  Recommend continued mobilization of patient up out of bed advancing diet etc. TARYN Guevara if possible toagain help would mobilize patient.

## 2022-10-26 NOTE — HPI
Patient is an 87-year-old lady with a past medical history of obstructive lung disease hypertension diabetes known to me after I performed a bronchoscopy on her earlier this year for a left lower lobe atelectasis who was admitted to the Colorectal surgery Service after having acute right lower quadrant abdominal pain for 24 hours.  She was noted to have a mass and went to the operating room for resection.  We were asked to consult because of bilateral wheezing and her history of obstructive lung disease and shortness of breath.  This morning the patient was having more shortness of breath with unable to cough up her secretions and was feeling much more poorly.  She denies fevers chills night sweats nausea vomiting diarrhea constipation.  She still NPO.  Has been getting IV fluids.  Has not been on antibiotics because of no concerns for infection at this time.  Of note she had a 15-20 lb weight loss over the past 6-9 months and had a recent hospitalization for severe asthma.

## 2022-10-26 NOTE — PLAN OF CARE
[facility]  Initial Discharge Assessment       Primary Care Provider: EZ Casillas MD    Admission Diagnosis: Abdominal cramping [R10.9]  Large bowel obstruction [K56.609]  Encounter for nasogastric (NG) tube placement [Z46.59]    Admission Date: 10/25/2022  Expected Discharge Date:     Discharge Barriers Identified: None    Payor: MEDICARE / Plan: MEDICARE PART A & B / Product Type: Government /     Extended Emergency Contact Information  Primary Emergency Contact: MorisholdenChuck  Address: [address] [place] [address]  Home Phone: [phone]  Work Phone: [phone]  Mobile Phone: [phone]  Relation: Spouse    Discharge Plan A: Home  Discharge Plan B: Home with family       Pharmacy - [facility] [address] AT [address] [facility]  [address]  [facility] [address]  Phone: [phone] Fax: [phone]    [facility] [address] [address]  Phone: [phone] Fax: [phone]      Initial Assessment (most recent)       Adult Discharge Assessment - 10/26/22 1021          Discharge Assessment    Assessment Type Discharge Planning Assessment     Confirmed/corrected address, phone number and insurance Yes     Confirmed Demographics Correct on Facesheet     Source of Information patient     Communicated ANTOINE with patient/caregiver Date not available/Unable to determine     Reason For Admission Large Bowel Obstruction     Lives With spouse     Facility Arrived From: Home     Do you expect to return to your current living situation? Yes     Prior to hospitilization cognitive status: Alert/Oriented     Current cognitive status: Alert/Oriented     Walking or Climbing Stairs Difficulty none     Dressing/Bathing Difficulty none     Equipment Currently Used at Home none     Readmission within 30 days? No     Patient currently being followed by outpatient  case management? No     Do you currently have service(s) that help you manage your care at home? No     Do you take prescription medications? Yes     Do you have prescription coverage? Yes     Coverage Blue Cross Blue Shield     Do you have any problems affording any of your prescribed medications? No     Is the patient taking medications as prescribed? yes     Who is going to help you get home at discharge? Spouse Chuck [phone]     How do you get to doctors appointments? family or friend will provide     Are you on dialysis? No     Do you take coumadin? No     Discharge Plan A Home     Discharge Plan B Home with family     DME Needed Upon Discharge  none     Discharge Plan discussed with: Patient     Discharge Barriers Identified None                   TRAVON Oscar, RN    EXT [id number]

## 2022-10-26 NOTE — HOSPITAL COURSE
From a colorectal surgery standpoint patient has done well today.  We saw her this morning as the pulmonary consult team because we were called to the bedside by the nurse for worsening shortness of breath.  Chest x-ray was done just as we got there and showed a complete right middle lobe collapse.  Of note patient has been refusing chest PT and has not been able to take deep breath because of her abdominal pain.  She is getting IV Dilaudid for pain currently.  Denies any other symptoms.  She is unable to cough up her secretions although she can feel them in her throat.

## 2022-10-26 NOTE — RESPIRATORY THERAPY
RAPID RESPONSE RESPIRATORY THERAPY NOTE             Code Status: Full Code   : 1935  Bed: 1059/1059 A:   MRN: [id number]  Time page Received: 1107  Time Rapid Response RT at Bedside: 1110  Time Rapid Response RT left Bedside: 1115    SITUATION    Evaluated patient for: SOB and low SpO2    BACKGROUND    Why is the patient in the hospital?: <principal problem not specified>    Patient has a past medical history of Asthma, Cyst of pancreas, Diabetes mellitus type II, Eczema of hand, Glaucoma, Hyperlipidemia, Hypertension, Lichen planus, Osteoporosis, and Pulmonary nodules.    24 Hours Vitals Range:  Temp:  [97.3 °F (36.3 °C)-98.3 °F (36.8 °C)]   Pulse:  [73-97]   Resp:  [14-90]   BP: (114-158)/(56-71)   SpO2:  [84 %-98 %]     Labs:    Recent Labs     10/24/22  2333 10/26/22  0424   * 133*   K 3.7 3.9    102   CO2 24 24   CREATININE 0.7 0.7   * 352*   PHOS  --  2.4*   MG  --  1.8        No results for input(s): PH, PCO2, PO2, HCO3, POCSATURATED, BE in the last 72 hours.    ASSESSMENT/INTERVENTIONS  Patient SOB, wheezing bilaterally, in pain however stable on 4L NC. MD ordered routine treatments and handed off to assigned RT.    Last Vitals: Temp: 97.8 °F (36.6 °C) (10/26 1206)  Pulse: 73 (10/26 1206)  Resp: 20 (10/26 1206)  BP: 149/64 (10/26 1206)  SpO2: 92 % (10/26 120)  Level of Consciousness: Level of Consciousness (AVPU): alert  Respiratory Effort: Respiratory Effort: Shallow, Normal  Expansion/Accessory Muscle Usage: Expansion/Accessory Muscles/Retractions: expansion symmetric  All Lung Field Breath Sounds: All Lung Fields Breath Sounds: Anterior:, Lateral:, wheezes, inspiratory, wheezes, expiratory  RUL Breath Sounds: coarse, crackles, coarse  RML Breath Sounds: crackles, coarse, diminished, coarse  RLL Breath Sounds: diminished  O2 Device/Concentration: 4LNC  NIPPV: No Surgical airway: No Vent: No  ETCO2 monitored:    Ambu at bedside: Ambu bag with the patient?: Yes, Adult Ambu    Active  Orders   Respiratory Care    Chest physiotherapy BID     Frequency: BID     Number of Occurrences: Until Specified     Order Questions:      Indications: COPIOUS SPUTUM PRODUCTION      Indications: LOBAR OR > ATELECTASIS      Indications: ATELECTASIS PROPHYLAXIS    Chest physiotherapy Q4H     Frequency: Q4H     Number of Occurrences: Until Specified     Order Comments: Call md if patient refuses       Order Questions:      Indications: LOBAR OR > ATELECTASIS    Inhalation Treatment Q4H     Frequency: Q4H     Number of Occurrences: Until Specified    Inhalation Treatment Q8H     Frequency: Q8H     Number of Occurrences: Until Specified    IPV Q4H     Frequency: Q4H     Number of Occurrences: Until Specified    Oxygen PRN     Frequency: PRN     Number of Occurrences: Until Specified     Order Questions:      Device type: Low flow      Device: Nasal Cannula (1- 5 Liters)      LPM: 2      Titrate O2 per Oxygen Titration Protocol: Yes      To maintain SpO2 goal of: >= 92%      Notify MD of: Inability to achieve desired SpO2; Sudden change in patient status and requires 20% increase in FiO2; Patient requires >60% FiO2       RECOMMENDATIONS  ?  We recommend: RRT Recs: Continue POC per primary team.      FOLLOW-UP    Disposition: Remain in room 1059.    Please call back the Rapid Response RTLisset RRT at x [phone] for any questions or concerns.

## 2022-10-26 NOTE — SUBJECTIVE & OBJECTIVE
Past Medical History:   Diagnosis Date    Asthma     Cyst of pancreas     liver    Diabetes mellitus type II     Eczema of hand     Glaucoma     Hyperlipidemia     Hypertension     Lichen planus     gums    Osteoporosis     Pulmonary nodules        Past Surgical History:   Procedure Laterality Date    BRONCHOSCOPY N/A 5/13/2022    Procedure: Bronchoscopy;  Surgeon: Tina Diagnostic Provider;  Location: [facility] OR [address] [place] [address];  Service: Anesthesiology;  Laterality: N/A;    CATARACT EXTRACTION, BILATERAL      CHOLECYSTECTOMY      COLECTOMY, RIGHT Right 10/25/2022    Procedure: COLECTOMY, RIGHT;  Surgeon: Jass Holman MD;  [facility];  Service: Colon and Rectal;  Laterality: Right;    ENDOSCOPIC ULTRASOUND OF UPPER GASTROINTESTINAL TRACT N/A 4/22/2022    Procedure: ULTRASOUND, UPPER GI TRACT, ENDOSCOPIC;  Surgeon: Max Rosado MD;  Location: [facility] ENDO (2ND FLR);  Service: Endoscopy;  Laterality: N/A;  urgent EUS (linear) for abnormal CT scan with dilated PD and increased cyst of pancreas cyst.  pap 44 mmHg  4/13:fully vaccinated. instructions via portal-[facility]    EPIDURAL STEROID INJECTION INTO LUMBAR SPINE N/A 11/8/2018    Procedure: Injection-steroid-epidural-lumbar L5-S1;  Surgeon: Nicci Osorio Jr., MD;  Location: [facility] PAIN MGT;  Service: Pain Management;  Laterality: N/A;    FUNCTIONAL ENDOSCOPIC SINUS SURGERY (FESS) USING COMPUTER-ASSISTED NAVIGATION N/A 6/17/2019    Procedure: FESS, USING COMPUTER-ASSISTED NAVIGATION;  Surgeon: Rosangela Isabel MD;  Location: [facility] OR;  Service: ENT;  Laterality: N/A;  video    HYSTERECTOMY      nasal polyps         Review of patient's allergies indicates:   Allergen Reactions    Aspirin Other (See Comments)     Asthma    TRIAD OF NASAL POLYPS, ASA  ALLERGY AND ASTHMA.        Aspirin Other (See Comments)    Nsaids (non-steroidal anti-inflammatory drug) Other (See Comments)     AVOID DUE TO TRIAD OF ASA ALLERGY, NASAL POLYPS,AND ASTHMA    Penicillin       Other reaction(s): Rash    9/30/20: tolerates ceftriaxone       Family History       Problem Relation (Age of Onset)    Heart disease Father, Brother    Hypertension Brother          Tobacco Use    Smoking status: Never    Smokeless tobacco: Never   Substance and Sexual Activity    Alcohol use: Yes     Comment: socially    Drug use: No    Sexual activity: Yes     Partners: Male     Birth control/protection: Post-menopausal      Review of Systems  Objective:     Vital Signs (Most Recent):  Temp: 97.8 °F (36.6 °C) (10/26/22 1206)  Pulse: 73 (10/26/22 1206)  Resp: 20 (10/26/22 1206)  BP: (!) 149/64 (10/26/22 1206)  SpO2: (!) 92 % (10/26/22 1206)   Vital Signs (24h Range):  Temp:  [97.3 °F (36.3 °C)-98.3 °F (36.8 °C)] 97.8 °F (36.6 °C)  Pulse:  [73-97] 73  Resp:  [14-90] 20  SpO2:  [84 %-98 %] 92 %  BP: (120-158)/(58-71) 149/64   Weight: 47.2 kg (104 lb)  Body mass index is 17.31 kg/m².      Intake/Output Summary (Last 24 hours) at 10/26/2022 1345  Last data filed at 10/26/2022 0618  Gross per 24 hour   Intake 1019.18 ml   Output 1275 ml   Net -255.82 ml       Physical Exam  Vitals and nursing note reviewed.   Constitutional:       General: She is in acute distress.      Appearance: She is well-developed. She is not diaphoretic.   HENT:      Head: Normocephalic and atraumatic.      Right Ear: External ear normal.      Left Ear: External ear normal.      Nose: Nose normal.      Mouth/Throat:      Pharynx: No oropharyngeal exudate.   Eyes:      General: No scleral icterus.        Right eye: No discharge.         Left eye: No discharge.      Conjunctiva/sclera: Conjunctivae normal.      Pupils: Pupils are equal, round, and reactive to light.   Neck:      Thyroid: No thyromegaly.      Vascular: No JVD.      Trachea: No tracheal deviation.   Cardiovascular:      Rate and Rhythm: Normal rate and regular rhythm.      Heart sounds: Normal heart sounds. No murmur heard.    No friction rub. No gallop.   Pulmonary:      Effort:  Pulmonary effort is normal. No respiratory distress.      Breath sounds: No stridor. Rales present. No wheezing.   Chest:      Chest wall: No tenderness.   Abdominal:      General: Bowel sounds are normal. There is no distension.      Palpations: Abdomen is soft.      Tenderness: There is no abdominal tenderness. There is no guarding or rebound.   Musculoskeletal:         General: No tenderness. Normal range of motion.      Cervical back: Normal range of motion and neck supple.   Lymphadenopathy:      Cervical: No cervical adenopathy.   Skin:     General: Skin is warm and dry.      Coloration: Skin is not pale.      Findings: No erythema or rash.   Neurological:      Mental Status: She is alert and oriented to person, place, and time.      Cranial Nerves: No cranial nerve deficit.      Motor: No abnormal muscle tone.      Coordination: Coordination normal.      Deep Tendon Reflexes: Reflexes are normal and symmetric.   Psychiatric:         Behavior: Behavior normal.         Thought Content: Thought content normal.         Judgment: Judgment normal.       Vents:  Oxygen Concentration (%): 100 (10/26/22 1123)  Lines/Drains/Airways       Drain  Duration                  Urethral Catheter 10/25/22 0745 Silicone;Double-lumen 16 Fr. 1 day              Peripheral Intravenous Line  Duration                  Peripheral IV - Single Lumen 10/26/22 0030 22 G Anterior;Right Forearm <1 day                  Significant Labs:    CBC/Anemia Profile:  Recent Labs   Lab 10/24/22  2333 10/26/22  0424   WBC 10.77 15.66*   HGB 12.7 12.0   HCT 40.9 38.4    239   MCV 94 93   RDW 13.7 14.0        Chemistries:  Recent Labs   Lab 10/24/22  2333 10/26/22  0424   * 133*   K 3.7 3.9    102   CO2 24 24   BUN 21 14   CREATININE 0.7 0.7   CALCIUM 9.0 7.1*   ALBUMIN 3.3*  --    PROT 6.3  --    BILITOT 0.4  --    ALKPHOS 48*  --    ALT 60*  --    AST 56*  --    MG  --  1.8   PHOS  --  2.4*       All pertinent labs within the past  24 hours have been reviewed.    Significant Imaging: I have reviewed all pertinent imaging results/findings within the past 24 hours.  CT: I have reviewed all pertinent results/findings within the past 24 hours and my personal findings are:  ct abdomen shows no acute lung findings  CXR: I have reviewed all pertinent results/findings within the past 24 hours and my personal findings are:    RML atelectasis

## 2022-10-26 NOTE — CONSULTS
[facility]  Critical Care Medicine  Consult Note    Patient Name: Sussy Costa  MRN: [id number]  Admission Date: 10/25/2022  Hospital Length of Stay: 1 days  Code Status: Full Code  Attending Physician: Jass Holman MD   Primary Care Provider: EZ Casillas MD   Principal Problem: <principal problem not specified>    Consults  Subjective:     HPI:  Patient is an 87-year-old lady with a past medical history of obstructive lung disease hypertension diabetes known to me after I performed a bronchoscopy on her earlier this year for a left lower lobe atelectasis who was admitted to the Colorectal surgery Service after having acute right lower quadrant abdominal pain for 24 hours.  She was noted to have a mass and went to the operating room for resection.  We were asked to consult because of bilateral wheezing and her history of obstructive lung disease and shortness of breath.  This morning the patient was having more shortness of breath with unable to cough up her secretions and was feeling much more poorly.  She denies fevers chills night sweats nausea vomiting diarrhea constipation.  She still NPO.  Has been getting IV fluids.  Has not been on antibiotics because of no concerns for infection at this time.  Of note she had a 15-20 lb weight loss over the past 6-9 months and had a recent hospitalization for severe asthma.       Hospital/ICU Course:  From a colorectal surgery standpoint patient has done well today.  We saw her this morning as the pulmonary consult team because we were called to the bedside by the nurse for worsening shortness of breath.  Chest x-ray was done just as we got there and showed a complete right middle lobe collapse.  Of note patient has been refusing chest PT and has not been able to take deep breath because of her abdominal pain.  She is getting IV Dilaudid for pain currently.  Denies any other symptoms.  She is unable to cough up her secretions although she can feel them in  her throat.      Past Medical History:   Diagnosis Date    Asthma     Cyst of pancreas     liver    Diabetes mellitus type II     Eczema of hand     Glaucoma     Hyperlipidemia     Hypertension     Lichen planus     gums    Osteoporosis     Pulmonary nodules        Past Surgical History:   Procedure Laterality Date    BRONCHOSCOPY N/A 5/13/2022    Procedure: Bronchoscopy;  Surgeon: Tina Diagnostic Provider;  Location: [facility] OR [address] [place] [address];  Service: Anesthesiology;  Laterality: N/A;    CATARACT EXTRACTION, BILATERAL      CHOLECYSTECTOMY      COLECTOMY, RIGHT Right 10/25/2022    Procedure: COLECTOMY, RIGHT;  Surgeon: Jass Holman MD;  [facility];  Service: Colon and Rectal;  Laterality: Right;    ENDOSCOPIC ULTRASOUND OF UPPER GASTROINTESTINAL TRACT N/A 4/22/2022    Procedure: ULTRASOUND, UPPER GI TRACT, ENDOSCOPIC;  Surgeon: Max Rosado MD;  Location: [facility] ENDO (2ND FLR);  Service: Endoscopy;  Laterality: N/A;  urgent EUS (linear) for abnormal CT scan with dilated PD and increased cyst of pancreas cyst.  pap 44 mmHg  4/13:fully vaccinated. instructions via portal-[facility]    EPIDURAL STEROID INJECTION INTO LUMBAR SPINE N/A 11/8/2018    Procedure: Injection-steroid-epidural-lumbar L5-S1;  Surgeon: Nicci Osorio Jr., MD;  Location: [facility] PAIN MGT;  Service: Pain Management;  Laterality: N/A;    FUNCTIONAL ENDOSCOPIC SINUS SURGERY (FESS) USING COMPUTER-ASSISTED NAVIGATION N/A 6/17/2019    Procedure: FESS, USING COMPUTER-ASSISTED NAVIGATION;  Surgeon: Rosangela Isabel MD;  Location: [facility] OR;  Service: ENT;  Laterality: N/A;  video    HYSTERECTOMY      nasal polyps         Review of patient's allergies indicates:   Allergen Reactions    Aspirin Other (See Comments)     Asthma    TRIAD OF NASAL POLYPS, ASA  ALLERGY AND ASTHMA.        Aspirin Other (See Comments)    Nsaids (non-steroidal anti-inflammatory drug) Other (See Comments)     AVOID DUE TO TRIAD OF ASA ALLERGY, NASAL POLYPS,AND ASTHMA     Penicillin      Other reaction(s): Rash    9/30/20: tolerates ceftriaxone       Family History       Problem Relation (Age of Onset)    Heart disease Father, Brother    Hypertension Brother          Tobacco Use    Smoking status: Never    Smokeless tobacco: Never   Substance and Sexual Activity    Alcohol use: Yes     Comment: socially    Drug use: No    Sexual activity: Yes     Partners: Male     Birth control/protection: Post-menopausal      Review of Systems  Objective:     Vital Signs (Most Recent):  Temp: 97.8 °F (36.6 °C) (10/26/22 1206)  Pulse: 73 (10/26/22 1206)  Resp: 20 (10/26/22 1206)  BP: (!) 149/64 (10/26/22 1206)  SpO2: (!) 92 % (10/26/22 1206)   Vital Signs (24h Range):  Temp:  [97.3 °F (36.3 °C)-98.3 °F (36.8 °C)] 97.8 °F (36.6 °C)  Pulse:  [73-97] 73  Resp:  [14-90] 20  SpO2:  [84 %-98 %] 92 %  BP: (120-158)/(58-71) 149/64   Weight: 47.2 kg (104 lb)  Body mass index is 17.31 kg/m².      Intake/Output Summary (Last 24 hours) at 10/26/2022 1345  Last data filed at 10/26/2022 0618  Gross per 24 hour   Intake 1019.18 ml   Output 1275 ml   Net -255.82 ml       Physical Exam  Vitals and nursing note reviewed.   Constitutional:       General: She is in acute distress.      Appearance: She is well-developed. She is not diaphoretic.   HENT:      Head: Normocephalic and atraumatic.      Right Ear: External ear normal.      Left Ear: External ear normal.      Nose: Nose normal.      Mouth/Throat:      Pharynx: No oropharyngeal exudate.   Eyes:      General: No scleral icterus.        Right eye: No discharge.         Left eye: No discharge.      Conjunctiva/sclera: Conjunctivae normal.      Pupils: Pupils are equal, round, and reactive to light.   Neck:      Thyroid: No thyromegaly.      Vascular: No JVD.      Trachea: No tracheal deviation.   Cardiovascular:      Rate and Rhythm: Normal rate and regular rhythm.      Heart sounds: Normal heart sounds. No murmur heard.    No friction rub. No gallop.    Pulmonary:      Effort: Pulmonary effort is normal. No respiratory distress.      Breath sounds: No stridor. Rales present. No wheezing.   Chest:      Chest wall: No tenderness.   Abdominal:      General: Bowel sounds are normal. There is no distension.      Palpations: Abdomen is soft.      Tenderness: There is no abdominal tenderness. There is no guarding or rebound.   Musculoskeletal:         General: No tenderness. Normal range of motion.      Cervical back: Normal range of motion and neck supple.   Lymphadenopathy:      Cervical: No cervical adenopathy.   Skin:     General: Skin is warm and dry.      Coloration: Skin is not pale.      Findings: No erythema or rash.   Neurological:      Mental Status: She is alert and oriented to person, place, and time.      Cranial Nerves: No cranial nerve deficit.      Motor: No abnormal muscle tone.      Coordination: Coordination normal.      Deep Tendon Reflexes: Reflexes are normal and symmetric.   Psychiatric:         Behavior: Behavior normal.         Thought Content: Thought content normal.         Judgment: Judgment normal.       Vents:  Oxygen Concentration (%): 100 (10/26/22 1123)  Lines/Drains/Airways       Drain  Duration                  Urethral Catheter 10/25/22 0745 Silicone;Double-lumen 16 Fr. 1 day              Peripheral Intravenous Line  Duration                  Peripheral IV - Single Lumen 10/26/22 0030 22 G Anterior;Right Forearm <1 day                  Significant Labs:    CBC/Anemia Profile:  Recent Labs   Lab 10/24/22  2333 10/26/22  0424   WBC 10.77 15.66*   HGB 12.7 12.0   HCT 40.9 38.4    239   MCV 94 93   RDW 13.7 14.0        Chemistries:  Recent Labs   Lab 10/24/22  2333 10/26/22  0424   * 133*   K 3.7 3.9    102   CO2 24 24   BUN 21 14   CREATININE 0.7 0.7   CALCIUM 9.0 7.1*   ALBUMIN 3.3*  --    PROT 6.3  --    BILITOT 0.4  --    ALKPHOS 48*  --    ALT 60*  --    AST 56*  --    MG  --  1.8   PHOS  --  2.4*       All  pertinent labs within the past 24 hours have been reviewed.    Significant Imaging: I have reviewed all pertinent imaging results/findings within the past 24 hours.  CT: I have reviewed all pertinent results/findings within the past 24 hours and my personal findings are:  ct abdomen shows no acute lung findings  CXR: I have reviewed all pertinent results/findings within the past 24 hours and my personal findings are:    RML atelectasis      ABG  No results for input(s): PH, PO2, PCO2, HCO3, BE in the last 168 hours.  Assessment/Plan:     Pulmonary  Asthma, moderate persistent  Continue DuoNebs and Laba/ ics, and in addition start LAMA scheduled  Given possible risk of aspiration will start antibiotics ceftriaxone and Zithromax for now.  Patient has a penicillin allergy but is able to take ceftriaxone.    Atelectasis  87-year-old lady with abdominal surgery yesterday and a history of asthma and mucus plugging in the past who has worsening shortness of breath and chest x-ray consistent with atelectasis.  We will start scheduled nebulizer treatments with DuoNebs and 3% saline and in addition continue chest PT and or IPV.  Recommend continued mobilization of patient up out of bed advancing diet etc. DC Guevara if possible toagain help would mobilize patient.    Renal/  Hyponatremia  Patient with a borderline sodium on admission now down to 133.  Would recommend stopping D5 half-normal saline especially given her diabetes and start a p.o. diet and also if necessary change fluids to lactated Ringer's or normal saline.    Consider replacing phos  Also I would recommend restarting DVT prophylaxis when appropriate given her malignancy and immobility at this time.    Thank you for your consult. I will follow-up with patient. Please contact us if you have any additional questions.     Freida Cobb MD  Critical Care Medicine  [facility]

## 2022-10-27 PROBLEM — E83.39 HYPOPHOSPHATEMIA: Status: ACTIVE | Noted: 2022-10-27

## 2022-10-27 LAB
ANION GAP SERPL CALC-SCNC: 11 MMOL/L (ref 8–16)
BASOPHILS NFR BLD: 0 % (ref 0–1.9)
BUN SERPL-MCNC: 12 MG/DL (ref 8–23)
BURR CELLS BLD QL SMEAR: ABNORMAL
CALCIUM SERPL-MCNC: 7.6 MG/DL (ref 8.7–10.5)
CHLORIDE SERPL-SCNC: 104 MMOL/L (ref 95–110)
CO2 SERPL-SCNC: 20 MMOL/L (ref 23–29)
CREAT SERPL-MCNC: 0.7 MG/DL (ref 0.5–1.4)
DIFFERENTIAL METHOD: ABNORMAL
EOSINOPHIL NFR BLD: 1 % (ref 0–8)
ERYTHROCYTE [DISTWIDTH] IN BLOOD BY AUTOMATED COUNT: 14 % (ref 11.5–14.5)
EST. GFR  (NO RACE VARIABLE): >60 ML/MIN/1.73 M^2
GLUCOSE SERPL-MCNC: 157 MG/DL (ref 70–110)
HCT VFR BLD AUTO: 38.3 % (ref 37–48.5)
HGB BLD-MCNC: 12 G/DL (ref 12–16)
IMM GRANULOCYTES # BLD AUTO: ABNORMAL K/UL (ref 0–0.04)
IMM GRANULOCYTES NFR BLD AUTO: ABNORMAL % (ref 0–0.5)
LACTATE SERPL-SCNC: 0.9 MMOL/L (ref 0.5–2.2)
LYMPHOCYTES NFR BLD: 3 % (ref 18–48)
MAGNESIUM SERPL-MCNC: 1.9 MG/DL (ref 1.6–2.6)
MCH RBC QN AUTO: 29.6 PG (ref 27–31)
MCHC RBC AUTO-ENTMCNC: 31.3 G/DL (ref 32–36)
MCV RBC AUTO: 95 FL (ref 82–98)
MONOCYTES NFR BLD: 4 % (ref 4–15)
NEUTROPHILS NFR BLD: 81 % (ref 38–73)
NEUTS BAND NFR BLD MANUAL: 11 %
NRBC BLD-RTO: 0 /100 WBC
PHOSPHATE SERPL-MCNC: 2 MG/DL (ref 2.7–4.5)
PLATELET # BLD AUTO: 200 K/UL (ref 150–450)
PLATELET BLD QL SMEAR: ABNORMAL
PMV BLD AUTO: 12.1 FL (ref 9.2–12.9)
POCT GLUCOSE: 161 MG/DL (ref 70–110)
POCT GLUCOSE: 167 MG/DL (ref 70–110)
POCT GLUCOSE: 181 MG/DL (ref 70–110)
POCT GLUCOSE: 181 MG/DL (ref 70–110)
POCT GLUCOSE: 190 MG/DL (ref 70–110)
POIKILOCYTOSIS BLD QL SMEAR: SLIGHT
POTASSIUM SERPL-SCNC: 4.8 MMOL/L (ref 3.5–5.1)
RBC # BLD AUTO: 4.05 M/UL (ref 4–5.4)
SODIUM SERPL-SCNC: 135 MMOL/L (ref 136–145)
WBC # BLD AUTO: 20.72 K/UL (ref 3.9–12.7)

## 2022-10-27 PROCEDURE — 94640 AIRWAY INHALATION TREATMENT: CPT

## 2022-10-27 PROCEDURE — 36415 COLL VENOUS BLD VENIPUNCTURE: CPT | Performed by: COLON & RECTAL SURGERY

## 2022-10-27 PROCEDURE — 80048 BASIC METABOLIC PNL TOTAL CA: CPT | Performed by: STUDENT IN AN ORGANIZED HEALTH CARE EDUCATION/TRAINING PROGRAM

## 2022-10-27 PROCEDURE — 63600175 PHARM REV CODE 636 W HCPCS: Performed by: STUDENT IN AN ORGANIZED HEALTH CARE EDUCATION/TRAINING PROGRAM

## 2022-10-27 PROCEDURE — 94799 UNLISTED PULMONARY SVC/PX: CPT

## 2022-10-27 PROCEDURE — 99900035 HC TECH TIME PER 15 MIN (STAT)

## 2022-10-27 PROCEDURE — 51798 US URINE CAPACITY MEASURE: CPT

## 2022-10-27 PROCEDURE — 25000003 PHARM REV CODE 250: Performed by: CLINICAL NURSE SPECIALIST

## 2022-10-27 PROCEDURE — 63600175 PHARM REV CODE 636 W HCPCS: Performed by: INTERNAL MEDICINE

## 2022-10-27 PROCEDURE — 97116 GAIT TRAINING THERAPY: CPT

## 2022-10-27 PROCEDURE — 63700000 PHARM REV CODE 250 ALT 637 W/O HCPCS: Performed by: INTERNAL MEDICINE

## 2022-10-27 PROCEDURE — 84100 ASSAY OF PHOSPHORUS: CPT | Performed by: STUDENT IN AN ORGANIZED HEALTH CARE EDUCATION/TRAINING PROGRAM

## 2022-10-27 PROCEDURE — 25000242 PHARM REV CODE 250 ALT 637 W/ HCPCS: Performed by: INTERNAL MEDICINE

## 2022-10-27 PROCEDURE — 83735 ASSAY OF MAGNESIUM: CPT | Performed by: STUDENT IN AN ORGANIZED HEALTH CARE EDUCATION/TRAINING PROGRAM

## 2022-10-27 PROCEDURE — 85027 COMPLETE CBC AUTOMATED: CPT | Performed by: STUDENT IN AN ORGANIZED HEALTH CARE EDUCATION/TRAINING PROGRAM

## 2022-10-27 PROCEDURE — 85007 BL SMEAR W/DIFF WBC COUNT: CPT | Performed by: STUDENT IN AN ORGANIZED HEALTH CARE EDUCATION/TRAINING PROGRAM

## 2022-10-27 PROCEDURE — 99232 PR SUBSEQUENT HOSPITAL CARE,LEVL II: ICD-10-PCS | Mod: ,,, | Performed by: INTERNAL MEDICINE

## 2022-10-27 PROCEDURE — 99232 SBSQ HOSP IP/OBS MODERATE 35: CPT | Mod: ,,, | Performed by: INTERNAL MEDICINE

## 2022-10-27 PROCEDURE — 97530 THERAPEUTIC ACTIVITIES: CPT

## 2022-10-27 PROCEDURE — 36415 COLL VENOUS BLD VENIPUNCTURE: CPT | Performed by: STUDENT IN AN ORGANIZED HEALTH CARE EDUCATION/TRAINING PROGRAM

## 2022-10-27 PROCEDURE — 94668 MNPJ CHEST WALL SBSQ: CPT

## 2022-10-27 PROCEDURE — 51701 INSERT BLADDER CATHETER: CPT

## 2022-10-27 PROCEDURE — 94761 N-INVAS EAR/PLS OXIMETRY MLT: CPT

## 2022-10-27 PROCEDURE — 27000221 HC OXYGEN, UP TO 24 HOURS

## 2022-10-27 PROCEDURE — 83605 ASSAY OF LACTIC ACID: CPT | Performed by: COLON & RECTAL SURGERY

## 2022-10-27 PROCEDURE — 20600001 HC STEP DOWN PRIVATE ROOM

## 2022-10-27 PROCEDURE — 25000003 PHARM REV CODE 250: Performed by: STUDENT IN AN ORGANIZED HEALTH CARE EDUCATION/TRAINING PROGRAM

## 2022-10-27 RX ORDER — ENOXAPARIN SODIUM 100 MG/ML
40 INJECTION SUBCUTANEOUS DAILY
Status: DISCONTINUED | OUTPATIENT
Start: 2022-10-27 | End: 2022-10-27

## 2022-10-27 RX ORDER — ENOXAPARIN SODIUM 100 MG/ML
30 INJECTION SUBCUTANEOUS DAILY
Status: DISCONTINUED | OUTPATIENT
Start: 2022-10-27 | End: 2022-11-02 | Stop reason: HOSPADM

## 2022-10-27 RX ORDER — CARVEDILOL 12.5 MG/1
12.5 TABLET ORAL 2 TIMES DAILY
Status: DISCONTINUED | OUTPATIENT
Start: 2022-10-27 | End: 2022-10-27

## 2022-10-27 RX ORDER — HYDROMORPHONE HYDROCHLORIDE 1 MG/ML
0.2 INJECTION, SOLUTION INTRAMUSCULAR; INTRAVENOUS; SUBCUTANEOUS
Status: DISCONTINUED | OUTPATIENT
Start: 2022-10-27 | End: 2022-11-02 | Stop reason: HOSPADM

## 2022-10-27 RX ADMIN — INSULIN DETEMIR 3 UNITS: 100 INJECTION, SOLUTION SUBCUTANEOUS at 10:10

## 2022-10-27 RX ADMIN — CARVEDILOL 12.5 MG: 12.5 TABLET, FILM COATED ORAL at 08:10

## 2022-10-27 RX ADMIN — SODIUM PHOSPHATE, MONOBASIC, MONOHYDRATE AND SODIUM PHOSPHATE, DIBASIC, ANHYDROUS 39.99 MMOL: 276; 142 INJECTION, SOLUTION INTRAVENOUS at 09:10

## 2022-10-27 RX ADMIN — SODIUM CHLORIDE 500 ML: 0.9 INJECTION, SOLUTION INTRAVENOUS at 11:10

## 2022-10-27 RX ADMIN — IPRATROPIUM BROMIDE AND ALBUTEROL SULFATE 3 ML: 2.5; .5 SOLUTION RESPIRATORY (INHALATION) at 07:10

## 2022-10-27 RX ADMIN — HYDROMORPHONE HYDROCHLORIDE 0.2 MG: 1 INJECTION, SOLUTION INTRAMUSCULAR; INTRAVENOUS; SUBCUTANEOUS at 11:10

## 2022-10-27 RX ADMIN — METOROPROLOL TARTRATE 5 MG: 5 INJECTION, SOLUTION INTRAVENOUS at 05:10

## 2022-10-27 RX ADMIN — FLUTICASONE FUROATE AND VILANTEROL TRIFENATATE 1 PUFF: 200; 25 POWDER RESPIRATORY (INHALATION) at 09:10

## 2022-10-27 RX ADMIN — SODIUM CHLORIDE SOLN NEBU 3% 4 ML: 3 NEBU SOLN at 07:10

## 2022-10-27 RX ADMIN — ATORVASTATIN CALCIUM 20 MG: 20 TABLET, FILM COATED ORAL at 10:10

## 2022-10-27 RX ADMIN — SODIUM CHLORIDE SOLN NEBU 3% 4 ML: 3 NEBU SOLN at 12:10

## 2022-10-27 RX ADMIN — TIOTROPIUM BROMIDE INHALATION SPRAY 2 PUFF: 3.12 SPRAY, METERED RESPIRATORY (INHALATION) at 09:10

## 2022-10-27 RX ADMIN — IPRATROPIUM BROMIDE AND ALBUTEROL SULFATE 3 ML: 2.5; .5 SOLUTION RESPIRATORY (INHALATION) at 04:10

## 2022-10-27 RX ADMIN — MONTELUKAST 10 MG: 10 TABLET, FILM COATED ORAL at 10:10

## 2022-10-27 RX ADMIN — ENOXAPARIN SODIUM 30 MG: 100 INJECTION SUBCUTANEOUS at 04:10

## 2022-10-27 RX ADMIN — CEFTRIAXONE 2 G: 2 INJECTION, SOLUTION INTRAVENOUS at 01:10

## 2022-10-27 RX ADMIN — IPRATROPIUM BROMIDE AND ALBUTEROL SULFATE 3 ML: 2.5; .5 SOLUTION RESPIRATORY (INHALATION) at 12:10

## 2022-10-27 RX ADMIN — HYDROMORPHONE HYDROCHLORIDE 0.3 MG: 1 INJECTION, SOLUTION INTRAMUSCULAR; INTRAVENOUS; SUBCUTANEOUS at 08:10

## 2022-10-27 RX ADMIN — HYDROMORPHONE HYDROCHLORIDE 0.2 MG: 1 INJECTION, SOLUTION INTRAMUSCULAR; INTRAVENOUS; SUBCUTANEOUS at 06:10

## 2022-10-27 RX ADMIN — AZITHROMYCIN MONOHYDRATE 500 MG: 250 TABLET ORAL at 08:10

## 2022-10-27 RX ADMIN — LATANOPROST 1 DROP: 50 SOLUTION OPHTHALMIC at 10:10

## 2022-10-27 RX ADMIN — ONDANSETRON 4 MG: 2 INJECTION INTRAMUSCULAR; INTRAVENOUS at 08:10

## 2022-10-27 RX ADMIN — INSULIN DETEMIR 3 UNITS: 100 INJECTION, SOLUTION SUBCUTANEOUS at 09:10

## 2022-10-27 RX ADMIN — SODIUM CHLORIDE, SODIUM LACTATE, POTASSIUM CHLORIDE, AND CALCIUM CHLORIDE: .6; .31; .03; .02 INJECTION, SOLUTION INTRAVENOUS at 05:10

## 2022-10-27 NOTE — CODE/ RAPID DOCUMENTATION
RAPID RESPONSE NURSE NOTE        Admit Date: 10/25/2022  LOS: 2  Code Status: Full Code   Date of Consult: 10/27/2022  : 1935  Age: 87 y.o.  Weight:   Wt Readings from Last 1 Encounters:   10/25/22 47.2 kg (104 lb)     Sex: female  Race: White   Bed: [address] [place] [address] A:   MRN: [id number]  Time Rapid Response Team page Received: 1150  Time Rapid Response Team at Bedside: 1151  Time Rapid Response Team left Bedside: 1225  Was the patient discharged from an ICU this admission? No   Was the patient discharged from a PACU within last 24 hours? No   Did the patient receive conscious sedation/general anesthesia in last 24 hours? No  Was the patient in the ED within the past 24 hours? No  Was the patient on NIPPV within the past 24 hours? No   Did this progress into an ARC or CPA: no  Attending Physician: Jass Holman MD  Primary Service: Colon and Rectal Surgery       SITUATION    Notified by bedside RN via phone call.  Reason for alert: hypotension  Called to evaluate the patient for Circulatory    BACKGROUND     Why is the patient in the hospital?: <principal problem not specified>    Patient has a past medical history of Asthma, Cyst of pancreas, Diabetes mellitus type II, Eczema of hand, Glaucoma, Hyperlipidemia, Hypertension, Lichen planus, Osteoporosis, and Pulmonary nodules.    Last Vitals:  Temp: 96.9 °F (36.1 °C) (10/27 155)  Pulse: 75 (10/27 155)  Resp: 18 (10/27 155)  BP: 97/54 (10/27 155)  SpO2: 93 % (10/27 1554)    24 Hours Vitals Range:  Temp:  [96.5 °F (35.8 °C)-98.7 °F (37.1 °C)]   Pulse:  []   Resp:  [16-24]   BP: ()/(46-64)   SpO2:  [90 %-96 %]     Labs:  Recent Labs     10/24/22  2333 10/26/22  0424 10/27/22  0339   WBC 10.77 15.66* 20.72*   HGB 12.7 12.0 12.0   HCT 40.9 38.4 38.3    239 200       Recent Labs     10/24/22  2333 10/26/22  0424 10/27/22  0339   * 133* 135*   K 3.7 3.9 4.8    102 104   CO2 24 24 20*   CREATININE 0.7 0.7 0.7   * 352* 157*   PHOS   --  2.4* 2.0*   MG  --  1.8 1.9        No results for input(s): PH, PCO2, PO2, HCO3, POCSATURATED, BE in the last 72 hours.     ASSESSMENT    Physical Exam  Cardiovascular:      Rate and Rhythm: Normal rate.   Pulmonary:      Effort: Pulmonary effort is normal.   Abdominal:      General: A surgical scar is present. Bowel sounds are decreased.      Palpations: Abdomen is soft.      Tenderness: There is abdominal tenderness.   Skin:     General: Skin is warm and dry.      Coloration: Skin is pale.   Neurological:      General: No focal deficit present.      Mental Status: She is alert and oriented to person, place, and time.     Patient sitting up in chair, respirations even and unlabored. No distress noted. Manual BP 76/38. Patient states that she feels weak and dizzy. Assisted back to bed without difficulty. Abdominal incision is WDL, abdomen is tender, hypoactive bowel sounds noted. Patient denies any flatulence but is belching. Lactic and NS Bolus ordered. Dr. Valles to bedside for evaluation.     INTERVENTIONS    The patient was seen for Cardiac problem. Staff concerns included hypotension. The following interventions were performed: continuous cardiac monitoring continued, normal saline 500 ml IV bolus , and Lactic level.    RECOMMENDATIONS    We recommend:     - Monitor BP after fluid bolus  - Encourage IS  - Discontinue Carvedilol      PROVIDER ESCALATION    Orders received and case discussed with Dr. Valles .    Primary team arrival time: 1155    Disposition: Remain in room 1059.    FOLLOW UP    Bedside RN, An  updated on plan of care. Instructed to call the Rapid Response Nurse, Vicky Sanon RN at [phone] for additional questions or concerns.

## 2022-10-27 NOTE — PROGRESS NOTES
Patient Name: Sussy Costa  Date: 10/27/2022  Service: Colon and Rectal Surgery    Subjective:  Stable pulmonary status overnight with dyspnea/tachypnea on 4L NC, receiving aggressive RT for collapsed RML on XR. Notes increased belching, distention with new nausea, previously tolerating small volume full liquids. No flatus/stool postoperatively, made NPO again this morning. Denies fevers/chills/sweats. Pain moderately controlled on dilaudid prn. Voiding freely without symptoms of retention, bladder scan pending this morning.     Patient developed episode of hypotension 80/40s after home Coreg administration with associated lightheadedness, denies chest pain and stable dyspnea/SOB. Patient returned to bed with 500cc IVF bolus with improvement in BP (100/50s) and resolution of symptoms.     Medications:    Current Facility-Administered Medications:     albuterol-ipratropium 2.5 mg-0.5 mg/3 mL nebulizer solution 3 mL, 3 mL, Nebulization, Q4H, Freida Cobb MD, 3 mL at 10/27/22 0759    atorvastatin tablet 20 mg, 20 mg, Oral, QHS, Jim Smith MD, 20 mg at 10/26/22 2123    azithromycin tablet 500 mg, 500 mg, Oral, Daily, Freida Cobb MD, 500 mg at 10/27/22 0818    carvediloL tablet 12.5 mg, 12.5 mg, Oral, BID, Jim Smith MD, 12.5 mg at 10/27/22 0818    cefTRIAXone (ROCEPHIN) 2 g/50 mL D5W IVPB, 2 g, Intravenous, Q24H, Freida Cobb MD, Stopped at 10/26/22 1504    dextrose 10% bolus 125 mL, 12.5 g, Intravenous, PRN, Jim Smith MD    dextrose 10% bolus 250 mL, 25 g, Intravenous, PRN, Jim Smith MD    enoxaparin injection 30 mg, 30 mg, Subcutaneous, Daily, Jim Smith MD    fluticasone furoate-vilanteroL 200-25 mcg/dose diskus inhaler 1 puff, 1 puff, Inhalation, Daily, Jim Smith MD, 1 puff at 10/27/22 0900    glucagon (human recombinant) injection 1 mg, 1 mg, Intramuscular, PRN, Jim Smith MD    hydrALAZINE injection 10 mg, 10 mg, Intravenous, Q8H PRN, Jim Smith,  MD, 10 mg at 10/25/22 1113    HYDROmorphone injection 0.3 mg, 0.3 mg, Intravenous, Q2H PRN, Jim Smith MD, 0.3 mg at 10/27/22 0819    influenza 65up-adj (QUADRIVALENT ADJUVANTED PF) vaccine 0.5 mL, 0.5 mL, Intramuscular, Prior to discharge, Jass Holman MD    insulin aspart U-100 pen 0-5 Units, 0-5 Units, Subcutaneous, Q6H PRN, Jim Smith MD, 3 Units at 10/26/22 1209    insulin detemir U-100 pen 3 Units, 3 Units, Subcutaneous, BID, Jim Smith MD, 3 Units at 10/27/22 0947    latanoprost 0.005 % ophthalmic solution 1 drop, 1 drop, Both Eyes, QHS, Jim Smith MD, 1 drop at 10/26/22 2159    montelukast tablet 10 mg, 10 mg, Oral, QHS, Jim Smith MD, 10 mg at 10/26/22 2123    naloxone 0.4 mg/mL injection 0.02 mg, 0.02 mg, Intravenous, PRN, Jim Smith MD    ondansetron injection 4 mg, 4 mg, Intravenous, Q4H PRN, Jim Smith MD, 4 mg at 10/27/22 0828    prochlorperazine injection Soln 5 mg, 5 mg, Intravenous, Q4H PRN, Jim Smith MD    sodium chloride 0.9% flush 10 mL, 10 mL, Intravenous, PRN, Jim Smith MD    sodium chloride 3% nebulizer solution 4 mL, 4 mL, Nebulization, Q6H, Freida Cobb MD, 4 mL at 10/27/22 0759    sodium phosphate 39.99 mmol in dextrose 5 % 250 mL IVPB, 39.99 mmol, Intravenous, Once, Jim Smith MD, Last Rate: 62.5 mL/hr at 10/27/22 0949, 39.99 mmol at 10/27/22 0949    tiotropium bromide 2.5 mcg/actuation inhaler 2 puff, 2 puff, Inhalation, Daily, Freida Cobb MD, 2 puff at 10/27/22 0900    Vital Signs:  Vitals:    10/27/22 1128   BP:    Pulse: 92   Resp:    Temp: 96.5 °F (35.8 °C)       In/Out:  Intake/Output - Last 3 Shifts         10/25 0700  10/26 0659 10/26 0700  10/27 0659 10/27 0700  10/28 0659    P.O.  280     I.V. (mL/kg) 1019.2 (21.6)      IV Piggyback 1200      Total Intake(mL/kg) 2219.2 (47) 280 (5.9)     Urine (mL/kg/hr) 2075 (1.8) 300 (0.3)     Stool 0      Blood 200      Total Output 2275 300     Net -55.8 -20             Urine Occurrence  1 x           Physical Exam:  General: Alert, oriented, in no apparent distress  HEENT: Sclera anicteric, trachea midline  Lungs: Tachypneic with increased work of breathing, completing full sentences with some accessory muscle use on 4L NC.  Abdomen: Soft, appropriate fang-incisional TTP without guarding/peritonitis, mildly distended with tympani throughout. Incision clean/dry/intact without erythema or drainage.  Extremities: Warm, well perfused, no edema, SCDs in place  Neuro: Grossly intact, moves all extremities  Psych: Appropriate affect    Laboratory Studies:  Complete Blood Count:  Lab Results   Component Value Date/Time    WBC 20.72 (H) 10/27/2022 03:39 AM    HGB 12.0 10/27/2022 03:39 AM    HCT 38.3 10/27/2022 03:39 AM    HCT 38 07/05/2022 02:33 PM    RBC 4.05 10/27/2022 03:39 AM     10/27/2022 03:39 AM     Basic Chemistry Panel:  Lab Results   Component Value Date/Time     (L) 10/27/2022 03:39 AM    K 4.8 10/27/2022 03:39 AM     10/27/2022 03:39 AM    CO2 20 (L) 10/27/2022 03:39 AM    BUN 12 10/27/2022 03:39 AM    CREATININE 0.7 10/27/2022 03:39 AM    CALCIUM 7.6 (L) 10/27/2022 03:39 AM     Hepatic Panel:  Lab Results   Component Value Date/Time    AST 56 (H) 10/24/2022 11:33 PM    ALT 60 (H) 10/24/2022 11:33 PM    ALKPHOS 48 (L) 10/24/2022 11:33 PM    BILITOT 0.4 10/24/2022 11:33 PM    ALBUMIN 3.3 (L) 10/24/2022 11:33 PM     Coagulation Panel:  Lab Results   Component Value Date/Time    INR 1.2 07/05/2022 01:16 PM     Imaging Studies:  XR Chest 10/27/22: Some increased opacity in the inferior hemithorax on the left side with blunting of the left costophrenic sulcus since 10/26/2022 at 11:05 are observed, consistent with the development of some left pleural fluid.  Appearance of the chest is otherwise unchanged since that time.    XR Chest 10/26/22: Interval development of significant atelectasis or pleural fluid at the right lung base    Assessment:  Sussy  JD Costa is a 87 y.o. 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    Plan:  Neuro: Continue dilaudid 0.2 q2hr prn pain control, plan to transition to oral analgesia once diet advanced  CV: h/o HTN/HLD; hold home Coreg in setting of hypoTN earlier today after first administration, will plan to restart metoprolol IV 5q6h for perioperative beta blockade once hemodynamically stable; cont home statin  Pulm: h/o severe asthma c/b recurrent lobar collapse with RML atelectasis on CXR; appreciate pulm recs for medical management and aggressive pulmonary toilet/RT  GI: s/p right hemicolectomy; awaiting return of bowel function with postoperative ileus. No indication for NGT placement, NPO today given nausea/belching/hiccups and large gastric bubble on CXR  : Low UOP but BUN/Cr stable/WNL; cont to limit IVF in order to optimize pulmonary function. Postoperative urinary retention after ca removal, straight cath today  Heme: Hgb stable, no e/o active bleeding; cont LVX, SCDs and ambulation for DVT PPX  ID: Afebrile, increasing leukocytosis likely pulmonary in etiology, less likely intra-abdominal in setting of benign abdomen; cont empiric Azithro/CTX per pulm recs  Endo: h/o IDDM2, cont 50% home dose Levemir with LDSSI while NPO  FEN: HLIV with small volume IVF bolus prn, replete lytes Mg>2 Phos>3 K>4, NPO since 10/24/22  PPX: LVX, SCDs/ambulation, RT/pulm hygiene, no indication for GI prophy  Dispo: ongoing, [facility]    Patient discussed with attendings, Dr. Holman and   Erika.    Jim Smith MD  Colon and Rectal Surgery Fellow

## 2022-10-27 NOTE — SUBJECTIVE & OBJECTIVE
Interval History: Patient reports feeling fatigued this morning. Sitting in chair at bedside, satting well on 4L NC at time of exam. Reports that she has not yet had a bowel movement.     Objective:     Vital Signs (Most Recent):  Temp: 98 °F (36.7 °C) (10/27/22 0731)  Pulse: 100 (10/27/22 0900)  Resp: 16 (10/27/22 0900)  BP: (!) 114/56 (10/27/22 0731)  SpO2: 95 % (10/27/22 0900)   Vital Signs (24h Range):  Temp:  [97.2 °F (36.2 °C)-98.7 °F (37.1 °C)] 98 °F (36.7 °C)  Pulse:  [] 100  Resp:  [16-25] 16  SpO2:  [92 %-96 %] 95 %  BP: (102-149)/(53-64) 114/56     Weight: 47.2 kg (104 lb)  Body mass index is 17.31 kg/m².      Intake/Output Summary (Last 24 hours) at 10/27/2022 1127  Last data filed at 10/27/2022 0525  Gross per 24 hour   Intake 280 ml   Output 300 ml   Net -20 ml       Physical Exam  Vitals and nursing note reviewed.   Constitutional:       General: She is not in acute distress.  HENT:      Head: Normocephalic and atraumatic.      Mouth/Throat:      Mouth: Mucous membranes are moist.      Pharynx: Oropharynx is clear.   Eyes:      Extraocular Movements: Extraocular movements intact.      Conjunctiva/sclera: Conjunctivae normal.   Cardiovascular:      Rate and Rhythm: Normal rate.      Pulses: Normal pulses.   Pulmonary:      Effort: No respiratory distress.      Breath sounds: No wheezing.   Abdominal:      General: There is no distension.      Palpations: Abdomen is soft.   Musculoskeletal:         General: No tenderness. Normal range of motion.      Cervical back: Normal range of motion and neck supple.   Skin:     General: Skin is warm and dry.      Capillary Refill: Capillary refill takes less than 2 seconds.   Neurological:      Mental Status: She is alert and oriented to person, place, and time. Mental status is at baseline.       Vents:  Oxygen Concentration (%): 100 (10/26/22 1535)    Lines/Drains/Airways       Peripheral Intravenous Line  Duration                  Peripheral IV - Single  Lumen 10/27/22 0102 22 G Anterior;Right Forearm <1 day                    Significant Labs:    CBC/Anemia Profile:  Recent Labs   Lab 10/26/22  0424 10/27/22  0339   WBC 15.66* 20.72*   HGB 12.0 12.0   HCT 38.4 38.3    200   MCV 93 95   RDW 14.0 14.0        Chemistries:  Recent Labs   Lab 10/26/22  0424 10/27/22  0339   * 135*   K 3.9 4.8    104   CO2 24 20*   BUN 14 12   CREATININE 0.7 0.7   CALCIUM 7.1* 7.6*   MG 1.8 1.9   PHOS 2.4* 2.0*       All pertinent labs within the past 24 hours have been reviewed.    Significant Imaging:  I have reviewed all pertinent imaging results/findings within the past 24 hours.

## 2022-10-27 NOTE — ASSESSMENT & PLAN NOTE
- Continue DuoNebs and LAMA, LABA/ICS, singulair  - Continue rocephin/azithro for CAP coverage  - discussed importance of getting out of bed and moving around room

## 2022-10-27 NOTE — PLAN OF CARE
Pt aox4 on 4L NC, patient verbalizes understanding of POC.    Problem: Infection  Goal: Absence of Infection Signs and Symptoms  Outcome: Ongoing, Progressing     Problem: Adult Inpatient Plan of Care  Goal: Plan of Care Review  Outcome: Ongoing, Progressing  Goal: Patient-Specific Goal (Individualized)  Outcome: Ongoing, Progressing  Goal: Absence of Hospital-Acquired Illness or Injury  Outcome: Ongoing, Progressing  Goal: Optimal Comfort and Wellbeing  Outcome: Ongoing, Progressing  Goal: Readiness for Transition of Care  Outcome: Ongoing, Progressing     Problem: Adjustment to Illness (Sepsis/Septic Shock)  Goal: Optimal Coping  Outcome: Ongoing, Progressing     Problem: Bleeding (Sepsis/Septic Shock)  Goal: Absence of Bleeding  Outcome: Ongoing, Progressing     Problem: Glycemic Control Impaired (Sepsis/Septic Shock)  Goal: Blood Glucose Level Within Desired Range  Outcome: Ongoing, Progressing     Problem: Infection Progression (Sepsis/Septic Shock)  Goal: Absence of Infection Signs and Symptoms  Outcome: Ongoing, Progressing     Problem: Nutrition Impaired (Sepsis/Septic Shock)  Goal: Optimal Nutrition Intake  Outcome: Ongoing, Progressing     Problem: Fluid Imbalance (Pneumonia)  Goal: Fluid Balance  Outcome: Ongoing, Progressing     Problem: Infection (Pneumonia)  Goal: Resolution of Infection Signs and Symptoms  Outcome: Ongoing, Progressing     Problem: Respiratory Compromise (Pneumonia)  Goal: Effective Oxygenation and Ventilation  Outcome: Ongoing, Progressing     Problem: Diabetes Comorbidity  Goal: Blood Glucose Level Within Targeted Range  Outcome: Ongoing, Progressing     Problem: Fall Injury Risk  Goal: Absence of Fall and Fall-Related Injury  Outcome: Ongoing, Progressing     Problem: Skin Injury Risk Increased  Goal: Skin Health and Integrity  Outcome: Ongoing, Progressing

## 2022-10-27 NOTE — PLAN OF CARE
Pt aox4 on 2L NC, patient verbalizes understanding of POC.    Problem: Infection  Goal: Absence of Infection Signs and Symptoms  Outcome: Ongoing, Progressing     Problem: Adult Inpatient Plan of Care  Goal: Plan of Care Review  Outcome: Ongoing, Progressing  Goal: Patient-Specific Goal (Individualized)  Outcome: Ongoing, Progressing  Goal: Absence of Hospital-Acquired Illness or Injury  Outcome: Ongoing, Progressing  Goal: Optimal Comfort and Wellbeing  Outcome: Ongoing, Progressing  Goal: Readiness for Transition of Care  Outcome: Ongoing, Progressing     Problem: Adjustment to Illness (Sepsis/Septic Shock)  Goal: Optimal Coping  Outcome: Ongoing, Progressing     Problem: Bleeding (Sepsis/Septic Shock)  Goal: Absence of Bleeding  Outcome: Ongoing, Progressing     Problem: Glycemic Control Impaired (Sepsis/Septic Shock)  Goal: Blood Glucose Level Within Desired Range  Outcome: Ongoing, Progressing     Problem: Infection Progression (Sepsis/Septic Shock)  Goal: Absence of Infection Signs and Symptoms  Outcome: Ongoing, Progressing     Problem: Nutrition Impaired (Sepsis/Septic Shock)  Goal: Optimal Nutrition Intake  Outcome: Ongoing, Progressing     Problem: Fluid Imbalance (Pneumonia)  Goal: Fluid Balance  Outcome: Ongoing, Progressing     Problem: Infection (Pneumonia)  Goal: Resolution of Infection Signs and Symptoms  Outcome: Ongoing, Progressing     Problem: Respiratory Compromise (Pneumonia)  Goal: Effective Oxygenation and Ventilation  Outcome: Ongoing, Progressing     Problem: Diabetes Comorbidity  Goal: Blood Glucose Level Within Targeted Range  Outcome: Ongoing, Progressing     Problem: Fall Injury Risk  Goal: Absence of Fall and Fall-Related Injury  Outcome: Ongoing, Progressing     Problem: Skin Injury Risk Increased  Goal: Skin Health and Integrity  Outcome: Ongoing, Progressing

## 2022-10-27 NOTE — ASSESSMENT & PLAN NOTE
- repeat CXR today largely unchanged from prior.   - continue duonebs, 3% nebs, chest PT and IPV  - encouraged OOB to chair and continued work with PT/OT

## 2022-10-27 NOTE — PROGRESS NOTES
[facility]  Pulmonology  Progress Note    Patient Name: Sussy Costa  MRN: [id number]  Admission Date: 10/25/2022  Hospital Length of Stay: 2 days  Code Status: Full Code  Attending Provider: Jass Holman MD  Primary Care Provider: EZ Casillas MD   Principal Problem: <principal problem not specified>    Subjective:     Interval History: Patient reports feeling fatigued this morning. Sitting in chair at bedside, satting well on 4L NC at time of exam. Reports that she has not yet had a bowel movement.     Objective:     Vital Signs (Most Recent):  Temp: 98 °F (36.7 °C) (10/27/22 0731)  Pulse: 100 (10/27/22 0900)  Resp: 16 (10/27/22 0900)  BP: (!) 114/56 (10/27/22 0731)  SpO2: 95 % (10/27/22 0900)   Vital Signs (24h Range):  Temp:  [97.2 °F (36.2 °C)-98.7 °F (37.1 °C)] 98 °F (36.7 °C)  Pulse:  [] 100  Resp:  [16-25] 16  SpO2:  [92 %-96 %] 95 %  BP: (102-149)/(53-64) 114/56     Weight: 47.2 kg (104 lb)  Body mass index is 17.31 kg/m².      Intake/Output Summary (Last 24 hours) at 10/27/2022 1127  Last data filed at 10/27/2022 0525  Gross per 24 hour   Intake 280 ml   Output 300 ml   Net -20 ml       Physical Exam  Vitals and nursing note reviewed.   Constitutional:       General: She is not in acute distress.  HENT:      Head: Normocephalic and atraumatic.      Mouth/Throat:      Mouth: Mucous membranes are moist.      Pharynx: Oropharynx is clear.   Eyes:      Extraocular Movements: Extraocular movements intact.      Conjunctiva/sclera: Conjunctivae normal.   Cardiovascular:      Rate and Rhythm: Normal rate.      Pulses: Normal pulses.   Pulmonary:      Effort: No respiratory distress.      Breath sounds: No wheezing.   Abdominal:      General: There is no distension.      Palpations: Abdomen is soft.   Musculoskeletal:         General: No tenderness. Normal range of motion.      Cervical back: Normal range of motion and neck supple.   Skin:     General: Skin is warm and dry.      Capillary  Refill: Capillary refill takes less than 2 seconds.   Neurological:      Mental Status: She is alert and oriented to person, place, and time. Mental status is at baseline.       Vents:  Oxygen Concentration (%): 100 (10/26/22 1535)    Lines/Drains/Airways       Peripheral Intravenous Line  Duration                  Peripheral IV - Single Lumen 10/27/22 0102 22 G Anterior;Right Forearm <1 day                    Significant Labs:    CBC/Anemia Profile:  Recent Labs   Lab 10/26/22  0424 10/27/22  0339   WBC 15.66* 20.72*   HGB 12.0 12.0   HCT 38.4 38.3    200   MCV 93 95   RDW 14.0 14.0        Chemistries:  Recent Labs   Lab 10/26/22  0424 10/27/22  0339   * 135*   K 3.9 4.8    104   CO2 24 20*   BUN 14 12   CREATININE 0.7 0.7   CALCIUM 7.1* 7.6*   MG 1.8 1.9   PHOS 2.4* 2.0*       All pertinent labs within the past 24 hours have been reviewed.    Significant Imaging:  I have reviewed all pertinent imaging results/findings within the past 24 hours.    Assessment/Plan:     Hypophosphatemia  - continue repletion as you are doing    Hyponatremia  - improving with cessation of D5 fluids   - will continue to monitor    Asthma, moderate persistent  - Continue DuoNebs and LAMA, LABA/ICS, singulair  - Continue rocephin/azithro for CAP coverage  - discussed importance of getting out of bed and moving around room     Atelectasis  - repeat CXR today largely unchanged from prior.   - continue duonebs, 3% nebs, chest PT and IPV  - encouraged OOB to chair and continued work with PT/OT         Amara Cam MD   Pulmonary/Critical Care Fellow  Pulmonology  [facility]

## 2022-10-27 NOTE — CONSULTS
Consult acknowledged and patient seen with treatment plan in place per attending Dr. Cobb; see her consult note from today for full information.

## 2022-10-27 NOTE — PLAN OF CARE
Continues to have increased resp effort ,but does have slight improvement with RT treatments . She has voided twice in bathroom since [place] [facility] . Dilaudid given once last pao . Currently on 4 liters Oxygen via NC . Accucheck have trended down since long acting insulin added to medication regimen . Have strongly urged patient to be OOB for meals and for toileting . Educated her on complications associated with prolonged bedrest and immobility . She voiced understanding and has been compliant.Incision site unremarkable . Started  a new PIV and continue RL as per orders. SR to Mild Sinus Tach on Telmetry  Problem: Infection  Goal: Absence of Infection Signs and Symptoms  10/27/2022 0359 by Nancy Moser RN  Outcome: Ongoing, Progressing  10/27/2022 0315 by Nancy Moser RN  Outcome: Ongoing, Progressing  10/27/2022 0254 by Nancy Moser RN  Outcome: Ongoing, Progressing  10/27/2022 0252 by Nancy Moser RN  Outcome: Ongoing, Progressing     Problem: Adult Inpatient Plan of Care  Goal: Plan of Care Review  10/27/2022 0359 by Nancy Moser RN  Outcome: Ongoing, Progressing  10/27/2022 0315 by Nancy Moser RN  Outcome: Ongoing, Progressing  10/27/2022 0254 by Nancy Moser RN  Outcome: Ongoing, Progressing  10/27/2022 0252 by Nancy Moser RN  Outcome: Ongoing, Progressing  Goal: Patient-Specific Goal (Individualized)  10/27/2022 0359 by Nancy Moser RN  Outcome: Ongoing, Progressing  10/27/2022 0315 by Nancy Moser RN  Outcome: Ongoing, Progressing  10/27/2022 0254 by Nancy Moser RN  Outcome: Ongoing, Progressing  10/27/2022 0252 by Nancy Moser RN  Outcome: Ongoing, Progressing  Goal: Absence of Hospital-Acquired Illness or Injury  10/27/2022 0359 by Nancy Moser RN  Outcome: Ongoing, Progressing  10/27/2022 0315 by Nancy Moser RN  Outcome: Ongoing, Progressing  10/27/2022 0254 by Nancy Moser RN  Outcome: Ongoing, Progressing  10/27/2022 0252 by Nancy Moser RN  Outcome: Ongoing,  Progressing  Goal: Optimal Comfort and Wellbeing  10/27/2022 0359 by Nancy Moser RN  Outcome: Ongoing, Progressing  10/27/2022 0315 by Nancy Moser RN  Outcome: Ongoing, Progressing  10/27/2022 0254 by Nancy Moser RN  Outcome: Ongoing, Progressing  10/27/2022 0252 by Nancy Moser RN  Outcome: Ongoing, Progressing  Goal: Readiness for Transition of Care  10/27/2022 0359 by Nancy Moser RN  Outcome: Ongoing, Progressing  10/27/2022 0315 by Nancy Moser RN  Outcome: Ongoing, Progressing  10/27/2022 0254 by Nancy Moser RN  Outcome: Ongoing, Progressing  10/27/2022 0252 by Nancy Moser RN  Outcome: Ongoing, Progressing     Problem: Adjustment to Illness (Sepsis/Septic Shock)  Goal: Optimal Coping  10/27/2022 0359 by Nancy Moser RN  Outcome: Ongoing, Progressing  10/27/2022 0315 by Nancy Moser RN  Outcome: Ongoing, Progressing  10/27/2022 0254 by Nancy Moser RN  Outcome: Ongoing, Progressing  10/27/2022 0252 by Nancy Moser RN  Outcome: Ongoing, Progressing     Problem: Bleeding (Sepsis/Septic Shock)  Goal: Absence of Bleeding  10/27/2022 0359 by Nancy Moser RN  Outcome: Ongoing, Progressing  10/27/2022 0315 by Nancy Moser RN  Outcome: Ongoing, Progressing  10/27/2022 0254 by Nancy Moser RN  Outcome: Ongoing, Progressing  10/27/2022 0252 by Nancy Moser RN  Outcome: Ongoing, Progressing     Problem: Glycemic Control Impaired (Sepsis/Septic Shock)  Goal: Blood Glucose Level Within Desired Range  10/27/2022 0359 by Nancy Moser RN  Outcome: Ongoing, Progressing  10/27/2022 0315 by Nancy Moser RN  Outcome: Ongoing, Progressing  10/27/2022 0254 by Nancy Moser RN  Outcome: Ongoing, Progressing  10/27/2022 0252 by Nancy Moser RN  Outcome: Ongoing, Progressing     Problem: Infection Progression (Sepsis/Septic Shock)  Goal: Absence of Infection Signs and Symptoms  10/27/2022 0359 by Nancy Moser RN  Outcome: Ongoing, Progressing  10/27/2022 0315 by Nancy Moser RN  Outcome:  Ongoing, Progressing  10/27/2022 0254 by Nancy Moser RN  Outcome: Ongoing, Progressing  10/27/2022 0252 by Nancy Moser RN  Outcome: Ongoing, Progressing     Problem: Nutrition Impaired (Sepsis/Septic Shock)  Goal: Optimal Nutrition Intake  10/27/2022 0359 by Nancy Moser RN  Outcome: Ongoing, Progressing  10/27/2022 0315 by Nancy Moser RN  Outcome: Ongoing, Progressing  10/27/2022 0254 by Nancy Moser RN  Outcome: Ongoing, Progressing  10/27/2022 0252 by Nancy Moser RN  Outcome: Ongoing, Progressing     Problem: Fluid Imbalance (Pneumonia)  Goal: Fluid Balance  10/27/2022 0359 by Nancy Moser RN  Outcome: Ongoing, Progressing  10/27/2022 0315 by Nancy Moser RN  Outcome: Ongoing, Progressing  10/27/2022 0254 by Nancy Moser RN  Outcome: Ongoing, Progressing  10/27/2022 0252 by Nancy Moser RN  Outcome: Ongoing, Progressing     Problem: Infection (Pneumonia)  Goal: Resolution of Infection Signs and Symptoms  10/27/2022 0359 by Nancy Moser RN  Outcome: Ongoing, Progressing  10/27/2022 0315 by Nancy Moser RN  Outcome: Ongoing, Progressing  10/27/2022 0254 by Nancy Moser RN  Outcome: Ongoing, Progressing  10/27/2022 0252 by Nancy Moser RN  Outcome: Ongoing, Progressing     Problem: Respiratory Compromise (Pneumonia)  Goal: Effective Oxygenation and Ventilation  10/27/2022 0359 by Nancy Moser RN  Outcome: Ongoing, Progressing  10/27/2022 0315 by Nancy Moser RN  Outcome: Ongoing, Progressing  10/27/2022 0254 by Nancy Moser RN  Outcome: Ongoing, Progressing  10/27/2022 0252 by Nancy Moser RN  Outcome: Ongoing, Progressing     Problem: Diabetes Comorbidity  Goal: Blood Glucose Level Within Targeted Range  10/27/2022 0359 by Nancy Moser RN  Outcome: Ongoing, Progressing  10/27/2022 0315 by Nancy Moser RN  Outcome: Ongoing, Progressing  10/27/2022 0254 by Nancy Moser RN  Outcome: Ongoing, Progressing  10/27/2022 0252 by Nancy Moser RN  Outcome: Ongoing, Progressing      Problem: Fall Injury Risk  Goal: Absence of Fall and Fall-Related Injury  10/27/2022 0359 by Nancy Moser RN  Outcome: Ongoing, Progressing  10/27/2022 0315 by Nancy Moser RN  Outcome: Ongoing, Progressing  10/27/2022 0254 by Nancy Moser RN  Outcome: Ongoing, Progressing  10/27/2022 0252 by Nancy Moser RN  Outcome: Ongoing, Progressing     Problem: Skin Injury Risk Increased  Goal: Skin Health and Integrity  10/27/2022 0359 by Nancy Moser RN  Outcome: Ongoing, Progressing  10/27/2022 0315 by Nancy Moser RN  Outcome: Ongoing, Progressing  10/27/2022 0254 by Nancy Moser RN  Outcome: Ongoing, Progressing  10/27/2022 0252 by Nancy Moser RN  Outcome: Ongoing, Progressing

## 2022-10-27 NOTE — PT/OT/SLP PROGRESS
"Physical Therapy Treatment    Patient Name:  Sussy Costa   MRN:  [id number]    Recommendations:     Discharge Recommendations:  nursing facility, skilled   Discharge Equipment Recommendations: shower chair   Barriers to discharge: None    Assessment:     Sussy Costa is a 87 y.o. female admitted with a medical diagnosis of <principal problem not specified>. Patient tolerated session fairly, limited by shortness of breath with mobility. SpO2 89% after ambulating back from bathroom with significantly increased time to recover to 92%. RN in room titrating oxygen during recovery.     She presents with the following impairments/functional limitations: weakness, impaired functional mobility, pain, gait instability, impaired endurance, impaired balance, decreased lower extremity function, impaired self care skills, impaired cardiopulmonary response to activity. Once medically stable, recommending pt discharge to nursing facility, skilled.    Rehab Prognosis: Good; patient continues to benefit from acute skilled PT services to address these deficits and reach maximum level of function.  Recent Surgery: Procedure(s) (LRB):  COLECTOMY, RIGHT (Right) 2 Days Post-Op    Plan:     During this hospitalization, patient to be seen 4 x/week to address the identified rehab impairments via gait training, therapeutic activities, therapeutic exercises, neuromuscular re-education and progress toward the following goals:    Plan of Care Expires:  11/25/22    Subjective     Chief Complaint: Stomach pain  Patient/Family Comments/Goals: "I feel like I can't breathe"  Pain/Comfort:  Pain Rating 1: 7/10  Location - Orientation 1: generalized  Location 1: abdomen  Pain Addressed 1: Distraction  Pain Rating Post-Intervention 1:  (not rated)    Objective:     Communicated with RN prior to session. Patient found up in chair with telemetry, oxygen, peripheral IV upon PT entry to room.     General Precautions: Standard, fall   Orthopedic " Precautions:N/A   Braces: N/A    Functional Mobility:  Bed Mobility:     Seated in chair at start of session and returned to chair  Transfers:     Sit to Stand: contact guard assistance with rolling walker  Toilet transfer: contact guard assistance with rolling walker using Step Transfer  Gait: Patient ambulated 2x10 ft with rolling walker and contact guard assistance. Patient demonstrates occasional unsteady gait, ambulates outside MOLLY of RW, flexed posture, and decreased klaudia. Cued to ambulate within MOLLY of RW. All lines remained intact throughout ambulation trial.  Balance:   Static Sitting: Good, able to maintain for 6 minute(s) with supervision  Dynamic Sitting: not assessed this visit  Static Standing: Good, able to maintain for 10 seconds with contact guard assistance  Dynamic Standing: Fair: Patient accepts minimal challenge, contact guard assistance    AM-PAC 6 CLICK MOBILITY  Turning over in bed (including adjusting bedclothes, sheets and blankets)?: 3  Sitting down on and standing up from a chair with arms (e.g., wheelchair, bedside commode, etc.): 3  Moving from lying on back to sitting on the side of the bed?: 3  Moving to and from a bed to a chair (including a wheelchair)?: 3  Need to walk in hospital room?: 3  Climbing 3-5 steps with a railing?: 1  Basic Mobility Total Score: 16     Therapeutic Activities and Exercises:  Patient educated on role of acute care PT and PT POC, safety while in hospital including calling nurse for mobility, and call light usage  Patient is clear to stand pivot transfer with RN/PCT, assist x1. Bedside commode instead of bathroom due to long recovery time  Extended time spent cuing for PLB technique and monitoring recovery after gait trial  Educated about importance of OOB mobility and remaining UIC most of the day  Educated about pursed lip breathing technique and cued for use with mobility    Patient left up in chair with all lines intact, call button in reach, and RN  notified.    GOALS:   Multidisciplinary Problems       Physical Therapy Goals          Problem: Physical Therapy    Goal Priority Disciplines Outcome Goal Variances Interventions   Physical Therapy Goal     PT, PT/OT Ongoing, Progressing     Description: Goals to met by 11/9/2022    1. Supine to sit with Modified [place]  2. Sit to supine with Modified [place]  3. Rolling to Left and Right with Modified [place].  4. Sit to stand transfer with Supervision  5. Bed to chair transfer with Supervision using LRAD  6. Gait  x 80 feet with Stand-by Assistance using LRAD   7. Stand for 10 minutes with Supervision using No Assistive Device  8. Lower extremity exercise program x15 reps per Instruction, with assistance as needed in order to facilitate improved strength, improved postural control, and improvement in functional independence                         Time Tracking:     PT Received On: 10/27/22  PT Start Time: 1011     PT Stop Time: 1044  PT Total Time (min): 33 min     Billable Minutes: Gait Training 12 min and Therapeutic Activity 15 min      Treatment Type: Treatment  PT/PTA: PT     PTA Visit Number: 0     10/27/2022

## 2022-10-28 PROBLEM — K63.89 COLONIC MASS: Status: ACTIVE | Noted: 2022-10-28

## 2022-10-28 LAB
ANION GAP SERPL CALC-SCNC: 6 MMOL/L (ref 8–16)
BASOPHILS # BLD AUTO: 0.02 K/UL (ref 0–0.2)
BASOPHILS NFR BLD: 0.1 % (ref 0–1.9)
BUN SERPL-MCNC: 11 MG/DL (ref 8–23)
CALCIUM SERPL-MCNC: 7.3 MG/DL (ref 8.7–10.5)
CHLORIDE SERPL-SCNC: 103 MMOL/L (ref 95–110)
CO2 SERPL-SCNC: 29 MMOL/L (ref 23–29)
CREAT SERPL-MCNC: 0.6 MG/DL (ref 0.5–1.4)
CRP SERPL-MCNC: 217.3 MG/L (ref 0–8.2)
DIFFERENTIAL METHOD: ABNORMAL
EOSINOPHIL # BLD AUTO: 0.3 K/UL (ref 0–0.5)
EOSINOPHIL NFR BLD: 2.2 % (ref 0–8)
ERYTHROCYTE [DISTWIDTH] IN BLOOD BY AUTOMATED COUNT: 13.9 % (ref 11.5–14.5)
EST. GFR  (NO RACE VARIABLE): >60 ML/MIN/1.73 M^2
GLUCOSE SERPL-MCNC: 87 MG/DL (ref 70–110)
HCT VFR BLD AUTO: 33.2 % (ref 37–48.5)
HGB BLD-MCNC: 10.4 G/DL (ref 12–16)
IMM GRANULOCYTES # BLD AUTO: 0.1 K/UL (ref 0–0.04)
IMM GRANULOCYTES NFR BLD AUTO: 0.7 % (ref 0–0.5)
LYMPHOCYTES # BLD AUTO: 0.6 K/UL (ref 1–4.8)
LYMPHOCYTES NFR BLD: 4.4 % (ref 18–48)
MAGNESIUM SERPL-MCNC: 1.7 MG/DL (ref 1.6–2.6)
MCH RBC QN AUTO: 29.5 PG (ref 27–31)
MCHC RBC AUTO-ENTMCNC: 31.3 G/DL (ref 32–36)
MCV RBC AUTO: 94 FL (ref 82–98)
MONOCYTES # BLD AUTO: 0.8 K/UL (ref 0.3–1)
MONOCYTES NFR BLD: 5.3 % (ref 4–15)
NEUTROPHILS # BLD AUTO: 12.6 K/UL (ref 1.8–7.7)
NEUTROPHILS NFR BLD: 87.3 % (ref 38–73)
NRBC BLD-RTO: 0 /100 WBC
PHOSPHATE SERPL-MCNC: 1.7 MG/DL (ref 2.7–4.5)
PLATELET # BLD AUTO: 192 K/UL (ref 150–450)
PMV BLD AUTO: 12.4 FL (ref 9.2–12.9)
POCT GLUCOSE: 117 MG/DL (ref 70–110)
POCT GLUCOSE: 133 MG/DL (ref 70–110)
POCT GLUCOSE: 141 MG/DL (ref 70–110)
POCT GLUCOSE: 152 MG/DL (ref 70–110)
POCT GLUCOSE: 82 MG/DL (ref 70–110)
POCT GLUCOSE: 83 MG/DL (ref 70–110)
POCT GLUCOSE: 90 MG/DL (ref 70–110)
POTASSIUM SERPL-SCNC: 3.5 MMOL/L (ref 3.5–5.1)
RBC # BLD AUTO: 3.52 M/UL (ref 4–5.4)
SODIUM SERPL-SCNC: 138 MMOL/L (ref 136–145)
WBC # BLD AUTO: 14.38 K/UL (ref 3.9–12.7)

## 2022-10-28 PROCEDURE — 63600175 PHARM REV CODE 636 W HCPCS: Performed by: STUDENT IN AN ORGANIZED HEALTH CARE EDUCATION/TRAINING PROGRAM

## 2022-10-28 PROCEDURE — 36415 COLL VENOUS BLD VENIPUNCTURE: CPT | Performed by: STUDENT IN AN ORGANIZED HEALTH CARE EDUCATION/TRAINING PROGRAM

## 2022-10-28 PROCEDURE — 80048 BASIC METABOLIC PNL TOTAL CA: CPT | Performed by: STUDENT IN AN ORGANIZED HEALTH CARE EDUCATION/TRAINING PROGRAM

## 2022-10-28 PROCEDURE — 63600175 PHARM REV CODE 636 W HCPCS

## 2022-10-28 PROCEDURE — 25000242 PHARM REV CODE 250 ALT 637 W/ HCPCS

## 2022-10-28 PROCEDURE — 99233 SBSQ HOSP IP/OBS HIGH 50: CPT | Mod: ,,, | Performed by: INTERNAL MEDICINE

## 2022-10-28 PROCEDURE — 27000221 HC OXYGEN, UP TO 24 HOURS

## 2022-10-28 PROCEDURE — 51798 US URINE CAPACITY MEASURE: CPT

## 2022-10-28 PROCEDURE — 25000242 PHARM REV CODE 250 ALT 637 W/ HCPCS: Performed by: INTERNAL MEDICINE

## 2022-10-28 PROCEDURE — 85025 COMPLETE CBC W/AUTO DIFF WBC: CPT | Performed by: STUDENT IN AN ORGANIZED HEALTH CARE EDUCATION/TRAINING PROGRAM

## 2022-10-28 PROCEDURE — 83735 ASSAY OF MAGNESIUM: CPT | Performed by: STUDENT IN AN ORGANIZED HEALTH CARE EDUCATION/TRAINING PROGRAM

## 2022-10-28 PROCEDURE — 25000003 PHARM REV CODE 250: Performed by: STUDENT IN AN ORGANIZED HEALTH CARE EDUCATION/TRAINING PROGRAM

## 2022-10-28 PROCEDURE — 94668 MNPJ CHEST WALL SBSQ: CPT

## 2022-10-28 PROCEDURE — 63700000 PHARM REV CODE 250 ALT 637 W/O HCPCS: Performed by: INTERNAL MEDICINE

## 2022-10-28 PROCEDURE — 27000646 HC AEROBIKA DEVICE

## 2022-10-28 PROCEDURE — 99900035 HC TECH TIME PER 15 MIN (STAT)

## 2022-10-28 PROCEDURE — 94640 AIRWAY INHALATION TREATMENT: CPT

## 2022-10-28 PROCEDURE — 97530 THERAPEUTIC ACTIVITIES: CPT | Mod: CQ

## 2022-10-28 PROCEDURE — 84100 ASSAY OF PHOSPHORUS: CPT | Performed by: STUDENT IN AN ORGANIZED HEALTH CARE EDUCATION/TRAINING PROGRAM

## 2022-10-28 PROCEDURE — 94664 DEMO&/EVAL PT USE INHALER: CPT

## 2022-10-28 PROCEDURE — 97116 GAIT TRAINING THERAPY: CPT | Mod: CQ

## 2022-10-28 PROCEDURE — 63600175 PHARM REV CODE 636 W HCPCS: Performed by: INTERNAL MEDICINE

## 2022-10-28 PROCEDURE — 20600001 HC STEP DOWN PRIVATE ROOM

## 2022-10-28 PROCEDURE — 97530 THERAPEUTIC ACTIVITIES: CPT

## 2022-10-28 PROCEDURE — 94761 N-INVAS EAR/PLS OXIMETRY MLT: CPT

## 2022-10-28 PROCEDURE — 99233 PR SUBSEQUENT HOSPITAL CARE,LEVL III: ICD-10-PCS | Mod: ,,, | Performed by: INTERNAL MEDICINE

## 2022-10-28 PROCEDURE — 25000003 PHARM REV CODE 250

## 2022-10-28 PROCEDURE — 86140 C-REACTIVE PROTEIN: CPT | Performed by: STUDENT IN AN ORGANIZED HEALTH CARE EDUCATION/TRAINING PROGRAM

## 2022-10-28 PROCEDURE — 97535 SELF CARE MNGMENT TRAINING: CPT

## 2022-10-28 PROCEDURE — 94799 UNLISTED PULMONARY SVC/PX: CPT

## 2022-10-28 RX ORDER — METOPROLOL TARTRATE 1 MG/ML
5 INJECTION, SOLUTION INTRAVENOUS EVERY 6 HOURS
Status: DISCONTINUED | OUTPATIENT
Start: 2022-10-28 | End: 2022-11-01

## 2022-10-28 RX ORDER — MAGNESIUM SULFATE HEPTAHYDRATE 40 MG/ML
4 INJECTION, SOLUTION INTRAVENOUS ONCE
Status: DISCONTINUED | OUTPATIENT
Start: 2022-10-28 | End: 2022-10-28

## 2022-10-28 RX ORDER — ALVIMOPAN 12 MG/1
12 CAPSULE ORAL 2 TIMES DAILY
Status: DISCONTINUED | OUTPATIENT
Start: 2022-10-28 | End: 2022-10-28

## 2022-10-28 RX ORDER — TAMSULOSIN HYDROCHLORIDE 0.4 MG/1
0.8 CAPSULE ORAL DAILY
Status: DISCONTINUED | OUTPATIENT
Start: 2022-10-28 | End: 2022-11-02 | Stop reason: HOSPADM

## 2022-10-28 RX ADMIN — SODIUM CHLORIDE SOLN NEBU 3% 4 ML: 3 NEBU SOLN at 12:10

## 2022-10-28 RX ADMIN — IPRATROPIUM BROMIDE AND ALBUTEROL SULFATE 3 ML: 2.5; .5 SOLUTION RESPIRATORY (INHALATION) at 08:10

## 2022-10-28 RX ADMIN — METOROPROLOL TARTRATE 5 MG: 5 INJECTION, SOLUTION INTRAVENOUS at 12:10

## 2022-10-28 RX ADMIN — SODIUM CHLORIDE, SODIUM LACTATE, POTASSIUM CHLORIDE, AND CALCIUM CHLORIDE 500 ML: .6; .31; .03; .02 INJECTION, SOLUTION INTRAVENOUS at 12:10

## 2022-10-28 RX ADMIN — SODIUM CHLORIDE SOLN NEBU 3% 4 ML: 3 NEBU SOLN at 03:10

## 2022-10-28 RX ADMIN — METOROPROLOL TARTRATE 5 MG: 5 INJECTION, SOLUTION INTRAVENOUS at 05:10

## 2022-10-28 RX ADMIN — ATORVASTATIN CALCIUM 20 MG: 20 TABLET, FILM COATED ORAL at 08:10

## 2022-10-28 RX ADMIN — IPRATROPIUM BROMIDE AND ALBUTEROL SULFATE 3 ML: 2.5; .5 SOLUTION RESPIRATORY (INHALATION) at 04:10

## 2022-10-28 RX ADMIN — HYDROMORPHONE HYDROCHLORIDE 0.2 MG: 1 INJECTION, SOLUTION INTRAMUSCULAR; INTRAVENOUS; SUBCUTANEOUS at 01:10

## 2022-10-28 RX ADMIN — IPRATROPIUM BROMIDE AND ALBUTEROL SULFATE 3 ML: 2.5; .5 SOLUTION RESPIRATORY (INHALATION) at 11:10

## 2022-10-28 RX ADMIN — METOROPROLOL TARTRATE 5 MG: 5 INJECTION, SOLUTION INTRAVENOUS at 11:10

## 2022-10-28 RX ADMIN — FLUTICASONE FUROATE AND VILANTEROL TRIFENATATE 1 PUFF: 200; 25 POWDER RESPIRATORY (INHALATION) at 08:10

## 2022-10-28 RX ADMIN — IPRATROPIUM BROMIDE AND ALBUTEROL SULFATE 3 ML: 2.5; .5 SOLUTION RESPIRATORY (INHALATION) at 01:10

## 2022-10-28 RX ADMIN — IPRATROPIUM BROMIDE AND ALBUTEROL SULFATE 3 ML: 2.5; .5 SOLUTION RESPIRATORY (INHALATION) at 03:10

## 2022-10-28 RX ADMIN — HYDROMORPHONE HYDROCHLORIDE 0.2 MG: 1 INJECTION, SOLUTION INTRAMUSCULAR; INTRAVENOUS; SUBCUTANEOUS at 04:10

## 2022-10-28 RX ADMIN — SODIUM CHLORIDE SOLN NEBU 3% 4 ML: 3 NEBU SOLN at 10:10

## 2022-10-28 RX ADMIN — MAGNESIUM SULFATE 2 G: 2 INJECTION INTRAVENOUS at 09:10

## 2022-10-28 RX ADMIN — SODIUM CHLORIDE SOLN NEBU 3% 4 ML: 3 NEBU SOLN at 08:10

## 2022-10-28 RX ADMIN — CEFTRIAXONE 2 G: 2 INJECTION, SOLUTION INTRAVENOUS at 12:10

## 2022-10-28 RX ADMIN — IPRATROPIUM BROMIDE AND ALBUTEROL SULFATE 3 ML: 2.5; .5 SOLUTION RESPIRATORY (INHALATION) at 10:10

## 2022-10-28 RX ADMIN — AZITHROMYCIN MONOHYDRATE 500 MG: 250 TABLET ORAL at 09:10

## 2022-10-28 RX ADMIN — INSULIN DETEMIR 2 UNITS: 100 INJECTION, SOLUTION SUBCUTANEOUS at 08:10

## 2022-10-28 RX ADMIN — ENOXAPARIN SODIUM 30 MG: 100 INJECTION SUBCUTANEOUS at 05:10

## 2022-10-28 RX ADMIN — LATANOPROST 1 DROP: 50 SOLUTION OPHTHALMIC at 08:10

## 2022-10-28 RX ADMIN — HYDROMORPHONE HYDROCHLORIDE 0.2 MG: 1 INJECTION, SOLUTION INTRAMUSCULAR; INTRAVENOUS; SUBCUTANEOUS at 10:10

## 2022-10-28 RX ADMIN — MONTELUKAST 10 MG: 10 TABLET, FILM COATED ORAL at 08:10

## 2022-10-28 RX ADMIN — SODIUM PHOSPHATE, MONOBASIC, MONOHYDRATE 39.99 MMOL: 276; 142 INJECTION, SOLUTION INTRAVENOUS at 09:10

## 2022-10-28 RX ADMIN — TAMSULOSIN HYDROCHLORIDE 0.8 MG: 0.4 CAPSULE ORAL at 05:10

## 2022-10-28 RX ADMIN — TIOTROPIUM BROMIDE INHALATION SPRAY 2 PUFF: 3.12 SPRAY, METERED RESPIRATORY (INHALATION) at 08:10

## 2022-10-28 NOTE — ASSESSMENT & PLAN NOTE
- repeat CXR 10/27 largely unchanged from prior.   - continue duonebs, 3% nebs, chest PT and IPV  - encouraged OOB to chair and continued work with PT/OT   - anticipate symptoms will improve with overall improvement in deconditioning.

## 2022-10-28 NOTE — PLAN OF CARE
Pt aox4 on RA, patient verbalizes understanding of POC.    Problem: Infection  Goal: Absence of Infection Signs and Symptoms  Outcome: Ongoing, Progressing     Problem: Adult Inpatient Plan of Care  Goal: Plan of Care Review  Outcome: Ongoing, Progressing  Goal: Patient-Specific Goal (Individualized)  Outcome: Ongoing, Progressing  Goal: Absence of Hospital-Acquired Illness or Injury  Outcome: Ongoing, Progressing  Goal: Optimal Comfort and Wellbeing  Outcome: Ongoing, Progressing  Goal: Readiness for Transition of Care  Outcome: Ongoing, Progressing     Problem: Adjustment to Illness (Sepsis/Septic Shock)  Goal: Optimal Coping  Outcome: Ongoing, Progressing     Problem: Bleeding (Sepsis/Septic Shock)  Goal: Absence of Bleeding  Outcome: Ongoing, Progressing     Problem: Glycemic Control Impaired (Sepsis/Septic Shock)  Goal: Blood Glucose Level Within Desired Range  Outcome: Ongoing, Progressing     Problem: Infection Progression (Sepsis/Septic Shock)  Goal: Absence of Infection Signs and Symptoms  Outcome: Ongoing, Progressing     Problem: Nutrition Impaired (Sepsis/Septic Shock)  Goal: Optimal Nutrition Intake  Outcome: Ongoing, Progressing     Problem: Fluid Imbalance (Pneumonia)  Goal: Fluid Balance  Outcome: Ongoing, Progressing     Problem: Infection (Pneumonia)  Goal: Resolution of Infection Signs and Symptoms  Outcome: Ongoing, Progressing     Problem: Respiratory Compromise (Pneumonia)  Goal: Effective Oxygenation and Ventilation  Outcome: Ongoing, Progressing     Problem: Diabetes Comorbidity  Goal: Blood Glucose Level Within Targeted Range  Outcome: Ongoing, Progressing     Problem: Fall Injury Risk  Goal: Absence of Fall and Fall-Related Injury  Outcome: Ongoing, Progressing     Problem: Skin Injury Risk Increased  Goal: Skin Health and Integrity  Outcome: Ongoing, Progressing     Problem: Fluid Volume Deficit  Goal: Fluid Balance  Outcome: Ongoing, Progressing

## 2022-10-28 NOTE — CARE UPDATE
RAPID RESPONSE NURSE ROUND       Rounding completed with charge RNThien for hypotension reports pt stable s/p bolus. No additional concerns verbalized at this time. Instructed to call [phone] for further concerns or assistance.

## 2022-10-28 NOTE — PLAN OF CARE
Problem: Physical Therapy  Goal: Physical Therapy Goal  Description: Goals to met by 11/9/2022    1. Supine to sit with Modified [place]  2. Sit to supine with Modified [place]  3. Rolling to Left and Right with Modified [place].  4. Sit to stand transfer with Supervision  5. Bed to chair transfer with Supervision using LRAD  6. Gait  x 80 feet with Stand-by Assistance using LRAD   7. Stand for 10 minutes with Supervision using No Assistive Device  8. Lower extremity exercise program x15 reps per Instruction, with assistance as needed in order to facilitate improved strength, improved postural control, and improvement in functional independence    Outcome: Ongoing, Progressing   Continue working toward goals.

## 2022-10-28 NOTE — PT/OT/SLP PROGRESS
Occupational Therapy   Treatment    Name: Sussy Costa  MRN: [id number]  Admitting Diagnosis:  Colonic mass  3 Days Post-Op    Recommendations:     Discharge Recommendations: nursing facility, skilled  Discharge Equipment Recommendations:  shower chair  Barriers to discharge:  None    Assessment:     Sussy Costa is a 87 y.o. female with a medical diagnosis of Colonic mass. Performance deficits affecting function are weakness, impaired balance, decreased safety awareness, pain, impaired endurance, impaired self care skills, impaired functional mobility, gait instability, impaired coordination. Pt tolerated session well, req'ing contact-guard assist-Min A w/ fxnl bed mobility, contact-guard assist - Min A x 1 w/ RW w/ fxnl transfers, and  set up-Min A x 1  w/ ADLs on this date as further detailed below. Pt is progressing towards fxnl goals, D/C rec to Skilled Nursing Facility. Cont POC.   Plan:     Patient to be seen 3 x/week to address the above listed problems via self-care/home management, therapeutic activities, therapeutic exercises  Plan of Care Expires: 11/26/22  Plan of Care Reviewed with: patient, spouse, son    Subjective     Pain/Comfort:  Pain Rating 1: 6/10  Location - Side 1: Bilateral  Location 1: abdomen  Pain Addressed 1: Reposition, Distraction, Nurse notified  Pain Rating Post-Intervention 1: 0/10    Objective:     Communicated with: nurse prior to session.  Patient found supine with peripheral IV, telemetry, oxygen upon OT entry to room.    General Precautions: Standard, fall   Orthopedic Precautions:N/A   Braces: N/A  Respiratory Status: Nasal cannula, flow 2 L/min    Bed Mobility:    Patient completed Scooting/Bridging with contact guard assistance  Patient completed Supine to Sit with contact guard assistance   CGA 2/2 INC pain; pt educated on utilizing log rolling technique to minimize pain to abdomen   Sit>Sup w/ Min A x 1 and vc's for log rolling technique    Functional  Mobility/Transfers:  Patient completed Sit <> Stand Transfer with contact guard assistance  with  rolling walker   Patient completed Toilet Transfer Step Transfer technique with minimum assistance with  rolling walker  Functional Mobility: Pt engaged in functional mobility to simulate household/community distances with CGA x 1 w/ RW for safety and line management  in order to maximize functional activity tolerance required for engagement in occupations of choice.  Pt completed standing marching exercises for 1 minute w/o seated rest break and good effort.     Activities of Daily Living:  Grooming: set up assist while seated EOB to comb hair and no LOB  Lower Body Dressing: maximal assistance seated EOB 2/2 INC pain w/ abdominal flexion  Toileting: minimum assistance while seated on toilet in bathroom      AMPAC 6 Click ADL: 20    Treatment & Education:  Pt educated on role of OT, POC, and goals for therapy.    POC was dicussed with patient/caregiver, who was included in its development and is in agreement with the identified goals and treatment plan.   Patient and family aware of patient's deficits and therapy progression.   Time provided for therapeutic counseling and discussion of health disposition.   Educated on importance of EOB/OOB mobility, maintaining routine, sitting up in chair, and maximizing independence with ADLs during admission   Pt completed ADLs and functional mobility for treatment session as noted above   Pt/caregiver verbalized understanding and expressed no further concerns/questions.      Patient left supine with all lines intact, call button in reach, and bed alarm on    GOALS: progressing towards, cont POC.  Multidisciplinary Problems       Occupational Therapy Goals          Problem: Occupational Therapy    Goal Priority Disciplines Outcome Interventions   Occupational Therapy Goal     OT, PT/OT Ongoing, Progressing    Description: Goals to be met by: 11/26/2022      Patient will increase  functional independence with ADLs by performing:    UE Dressing with Modified Brown.  LE Dressing with Stand-by Assistance.  Grooming while standing with Supervision.  Toileting from bedside commode with Supervision for hygiene and clothing management.   Toilet transfer to bedside commode with Supervision.  Increased functional strength to WFL for self care.  Upper extremity exercise program x10 reps per handout, with supervision.                          Time Tracking:     OT Date of Treatment: 10/28/22  OT Start Time: 1411  OT Stop Time: 1435  OT Total Time (min): 24 min    Billable Minutes:Self Care/Home Management 12  Therapeutic Activity 12    OT/FINA: OT     FINA Visit Number: 0    10/28/2022

## 2022-10-28 NOTE — SUBJECTIVE & OBJECTIVE
Subjective:     Interval History: Pt weaned down to 2 lpm this am. Doing well from a surgical standpoint. Still with ARDs. +phlegm production. Good pain control. +urinary retention    Post-Op Info:  Procedure(s) (LRB):  COLECTOMY, RIGHT (Right)   3 Days Post-Op      Medications:  Continuous Infusions:  Scheduled Meds:   albuterol-ipratropium  3 mL Nebulization Q4H    atorvastatin  20 mg Oral QHS    azithromycin  500 mg Oral Daily    cefTRIAXone (ROCEPHIN) IVPB  2 g Intravenous Q24H    enoxaparin  30 mg Subcutaneous Daily    fluticasone furoate-vilanteroL  1 puff Inhalation Daily    insulin detemir U-100  2 Units Subcutaneous BID    lactated ringers  500 mL Intravenous Once    latanoprost  1 drop Both Eyes QHS    metoprolol  5 mg Intravenous Q6H    montelukast  10 mg Oral QHS    sodium chloride 3%  4 mL Nebulization Q6H    sodium phosphate IVPB  39.99 mmol Intravenous Once    tamsulosin  0.8 mg Oral Daily    tiotropium bromide  2 puff Inhalation Daily     PRN Meds:   dextrose 10%    dextrose 10%    glucagon (human recombinant)    HYDROmorphone    influenza 65up-adj    insulin aspart U-100    naloxone    ondansetron    prochlorperazine    sodium chloride 0.9%        Objective:     Vital Signs (Most Recent):  Temp: 97.9 °F (36.6 °C) (10/28/22 1155)  Pulse: 95 (10/28/22 1155)  Resp: 20 (10/28/22 1155)  BP: (!) 102/55 (10/28/22 1155)  SpO2: (!) 90 % (10/28/22 1155)   Vital Signs (24h Range):  Temp:  [96.9 °F (36.1 °C)-99 °F (37.2 °C)] 97.9 °F (36.6 °C)  Pulse:  [] 95  Resp:  [14-20] 20  SpO2:  [90 %-99 %] 90 %  BP: ()/(46-82) 102/55     Intake/Output - Last 3 Shifts         10/26 0700  10/27 0659 10/27 0700  10/28 0659 10/28 0700  10/29 0659    P.O. 280 0     I.V. (mL/kg)       IV Piggyback  997.1     Total Intake(mL/kg) 280 (5.9) 997.1 (21.1)     Urine (mL/kg/hr) 300 (0.3) 350 (0.3)     Stool  0     Blood       Total Output 300 350     Net -20 +647.1            Urine Occurrence 1 x      Stool Occurrence  0  x             Physical Exam  Vitals and nursing note reviewed.   Constitutional:       General: She is in acute distress.      Appearance: She is not toxic-appearing.   HENT:      Head: Normocephalic and atraumatic.      Nose: Nose normal.      Mouth/Throat:      Mouth: Mucous membranes are moist.   Eyes:      Extraocular Movements: Extraocular movements intact.      Pupils: Pupils are equal, round, and reactive to light.   Cardiovascular:      Rate and Rhythm: Normal rate.   Pulmonary:      Effort: Respiratory distress present.      Breath sounds: Rhonchi present.   Musculoskeletal:      Cervical back: Normal range of motion.   Skin:     General: Skin is warm and dry.      Capillary Refill: Capillary refill takes less than 2 seconds.   Neurological:      General: No focal deficit present.      Mental Status: She is alert.   Psychiatric:         Mood and Affect: Mood normal.         Behavior: Behavior normal.     Significant Labs:  BMP:   Recent Labs   Lab 10/28/22  0454   GLU 87      K 3.5      CO2 29   BUN 11   CREATININE 0.6   CALCIUM 7.3*   MG 1.7     CBC:   Recent Labs   Lab 10/28/22  0453   WBC 14.38*   RBC 3.52*   HGB 10.4*   HCT 33.2*      MCV 94   MCH 29.5   MCHC 31.3*       Significant Diagnostics:  None

## 2022-10-28 NOTE — PROGRESS NOTES
[facility]  Pulmonology  Progress Note    Patient Name: Sussy Costa  MRN: [id number]  Admission Date: 10/25/2022  Hospital Length of Stay: 3 days  Code Status: Full Code  Attending Provider: Jass Holman MD  Primary Care Provider: EZ Casillas MD   Principal Problem: <principal problem not specified>    Subjective:     Interval History: Improved this morning, able to wean to 2L. Still with significant phlegm production, unable to expectorate. Did work with PT yesterday with good results.     Objective:     Vital Signs (Most Recent):  Temp: 97.4 °F (36.3 °C) (10/28/22 0751)  Pulse: 90 (10/28/22 0841)  Resp: 16 (10/28/22 0841)  BP: 128/82 (10/28/22 0751)  SpO2: (!) 94 % (10/28/22 0841)   Vital Signs (24h Range):  Temp:  [96.5 °F (35.8 °C)-99 °F (37.2 °C)] 97.4 °F (36.3 °C)  Pulse:  [] 90  Resp:  [14-20] 16  SpO2:  [90 %-99 %] 94 %  BP: ()/(46-82) 128/82     Weight: 47.2 kg (104 lb)  Body mass index is 17.31 kg/m².      Intake/Output Summary (Last 24 hours) at 10/28/2022 0844  Last data filed at 10/28/2022 0416  Gross per 24 hour   Intake 997.12 ml   Output 350 ml   Net 647.12 ml       Physical Exam  Constitutional:       General: She is not in acute distress.     Appearance: Normal appearance. She is not ill-appearing.   HENT:      Head: Atraumatic.      Nose: Nose normal.      Mouth/Throat:      Mouth: Mucous membranes are moist.   Eyes:      Pupils: Pupils are equal, round, and reactive to light.   Cardiovascular:      Rate and Rhythm: Normal rate and regular rhythm.      Pulses: Normal pulses.   Pulmonary:      Breath sounds: Wheezing and rales present.      Comments: Significant secretions heard along with weak cough. Diffuse end-expiratory wheezing and rhonchi throughout anterior chest. No tachypnea, no accessory muscle use.   Abdominal:      General: Abdomen is flat.   Musculoskeletal:         General: No swelling. Normal range of motion.      Cervical back: Normal range of motion.  No rigidity.   Skin:     General: Skin is warm and dry.      Capillary Refill: Capillary refill takes less than 2 seconds.   Neurological:      General: No focal deficit present.      Mental Status: She is alert and oriented to person, place, and time.       Significant Labs:    CBC/Anemia Profile:  Recent Labs   Lab 10/27/22  0339 10/28/22  0453   WBC 20.72* 14.38*   HGB 12.0 10.4*   HCT 38.3 33.2*    192   MCV 95 94   RDW 14.0 13.9        Chemistries:  Recent Labs   Lab 10/27/22  0339 10/28/22  0454   * 138   K 4.8 3.5    103   CO2 20* 29   BUN 12 11   CREATININE 0.7 0.6   CALCIUM 7.6* 7.3*   MG 1.9 1.7   PHOS 2.0* 1.7*       All pertinent labs within the past 24 hours have been reviewed.    Significant Imaging:  I have reviewed all pertinent imaging results/findings within the past 24 hours.    Assessment/Plan:     Asthma, moderate persistent  - Continue DuoNebs and LAMA, LABA/ICS, singulair  - Continue rocephin/azithro for CAP coverage  - secretion mobilization as above    Atelectasis  - repeat CXR 10/27 largely unchanged from prior.   - continue duonebs, 3% nebs, chest PT and IPV  - encouraged OOB to chair and continued work with PT/OT   - anticipate symptoms will improve with overall improvement in deconditioning.         Thank you for the consult. Please call if we can be of any further assistance.       Megan Bryson MD  Pulmonology

## 2022-10-28 NOTE — ASSESSMENT & PLAN NOTE
- Continue DuoNebs and LAMA, LABA/ICS, singulair  - Continue rocephin/azithro for CAP coverage  - secretion mobilization as above

## 2022-10-28 NOTE — PROGRESS NOTES
Only 50 ML of urine output was produced during this shift. After 500 ML LR bolus earlier. Situation reviewed with Dr. Burton along with 207 ML bladder scan. Orders to be considered for Flomax. Additional interventions to be decided upon the primary team shortly. No other new orders given, will continue to monitor.

## 2022-10-28 NOTE — PT/OT/SLP PROGRESS
"Physical Therapy Treatment    Patient Name:  Sussy Costa   MRN:  [id number]    Recommendations:     Discharge Recommendations:  nursing facility, skilled   Discharge Equipment Recommendations: shower chair   Barriers to discharge:  decreased strength/endurance/functional mobility    Assessment:     Sussy Costa is a 87 y.o. female admitted with a medical diagnosis of Colonic mass.  She presents with the following impairments/functional limitations:  weakness, impaired endurance, impaired functional mobility, gait instability, impaired balance, decreased lower extremity function, impaired coordination, impaired cardiopulmonary response to activity Pt would continue to benefit from P.T. To address impairments listed above.  .    Rehab Prognosis: Fair; patient would benefit from acute skilled PT services to address these deficits and reach maximum level of function.    Recent Surgery: Procedure(s) (LRB):  COLECTOMY, RIGHT (Right) 3 Days Post-Op    Plan:     During this hospitalization, patient to be seen 4 x/week to address the identified rehab impairments via gait training, therapeutic activities, therapeutic exercises, neuromuscular re-education and progress toward the following goals:    Plan of Care Expires:  11/25/22    Subjective     Chief Complaint: "I'll try." To tx.  Patient/Family Comments/goals: Pt agreed to tx.  Pain/Comfort:  Pain Rating 1:  (abdominal pain with movement, but did not rate)  Location - Orientation 1: generalized  Location 1: abdomen  Pain Addressed 1: Reposition, Distraction, Nurse notified  Pain Rating Post-Intervention 1:  (as above)      Objective:     Communicated with RN prior to session.  Patient found supine with telemetry, peripheral IV, oxygen upon PT entry to room.     General Precautions: Standard, fall   Orthopedic Precautions:N/A   Braces:    Respiratory Status: Nasal cannula, flow 2 L/min     Functional Mobility:  Bed Mobility:     Rolling Left:  stand by assistance and " with b/r for assist  Scooting: stand by assistance and to EOB  Supine to Sit: minimum assistance and trunk to midline via log rolling  Sit to Supine: minimum assistance for LEs onto EOB via log rolling  Transfers:     Sit to Stand:  contact guard assistance with rolling walker and with vc's for hand placement.  3 trials  Toilet Transfer: minimum assistance with  rolling walker and grab bars  using  Step Transfer  Gait: 15ft x 2 with RW and CGA/SBA with assist for extended wall unit O2 line @ 2lpm.  Standing rest between bouts and a seated rest after second bout secondary to decreased endurance and SOB with pt instructed in PLB during rest breaks.  Pt then ambulated 10ft x 2 on wall unit O2 @ 2lpm with assist for line management, to and from bathroom, with RW and cGA/SBA.  Toilet transfer as above.  Balance: sitting fair+, standing fair/fait+ RW, gait fair/fair+ RW      AM-PAC 6 CLICK MOBILITY  Turning over in bed (including adjusting bedclothes, sheets and blankets)?: 3  Sitting down on and standing up from a chair with arms (e.g., wheelchair, bedside commode, etc.): 3  Moving from lying on back to sitting on the side of the bed?: 3  Moving to and from a bed to a chair (including a wheelchair)?: 3  Need to walk in hospital room?: 3  Climbing 3-5 steps with a railing?: 1  Basic Mobility Total Score: 16       Treatment & Education:  Pt sat EOB and performed BLE therex: AP and LAQs 10 x 2 reps.  After a seated rest secondary to SOB, pt stood and performed marching in place x 10 reps with RW and CGA with a seated rest after.  Gait as above.  Pt stated she sat up in b/s chair a while earlier today and requested to return to bed after tx to rest.    Patient left supine with all lines intact, call button in reach, bed alarm on, and RN notified..    GOALS:   Multidisciplinary Problems       Physical Therapy Goals          Problem: Physical Therapy    Goal Priority Disciplines Outcome Goal Variances Interventions   Physical  Therapy Goal     PT, PT/OT Ongoing, Progressing     Description: Goals to met by 11/9/2022    1. Supine to sit with Modified [place]  2. Sit to supine with Modified [place]  3. Rolling to Left and Right with Modified [place].  4. Sit to stand transfer with Supervision  5. Bed to chair transfer with Supervision using LRAD  6. Gait  x 80 feet with Stand-by Assistance using LRAD   7. Stand for 10 minutes with Supervision using No Assistive Device  8. Lower extremity exercise program x15 reps per Instruction, with assistance as needed in order to facilitate improved strength, improved postural control, and improvement in functional independence                         Time Tracking:     PT Received On: 10/28/22  PT Start Time: 1514     PT Stop Time: 1538  PT Total Time (min): 24 min     Billable Minutes: Gait Training 13 and Therapeutic Activity 11    Treatment Type: Treatment  PT/PTA: PTA     PTA Visit Number: 1     10/28/2022

## 2022-10-28 NOTE — PLAN OF CARE
Plan of care reviewed with patient, who verbalized understanding. Pt describes pain as well controlled without regular use of narcotic pain medications. Pt is able to turn and position themselves, and has gotten out of bed, sat up in the chair and ambulated in the room. Pt is tolerating their NPO status without nausea, but has not had a bowel movement since 10/23. Pt is using their incentive spirometer independently. Additional use of the incentive spirometer and ambulation in the halls should be encouraged this morning after a restful sleep. Lack of urine output and adequate fluids is still of concern.       Problem: Infection  Goal: Absence of Infection Signs and Symptoms  Outcome: Ongoing, Progressing     Problem: Adult Inpatient Plan of Care  Goal: Plan of Care Review  Outcome: Ongoing, Progressing  Goal: Patient-Specific Goal (Individualized)  Outcome: Ongoing, Progressing  Goal: Absence of Hospital-Acquired Illness or Injury  Outcome: Ongoing, Progressing  Goal: Optimal Comfort and Wellbeing  Outcome: Ongoing, Progressing  Goal: Readiness for Transition of Care  Outcome: Ongoing, Progressing     Problem: Adjustment to Illness (Sepsis/Septic Shock)  Goal: Optimal Coping  Outcome: Ongoing, Progressing     Problem: Bleeding (Sepsis/Septic Shock)  Goal: Absence of Bleeding  Outcome: Ongoing, Progressing     Problem: Glycemic Control Impaired (Sepsis/Septic Shock)  Goal: Blood Glucose Level Within Desired Range  Outcome: Ongoing, Progressing     Problem: Infection Progression (Sepsis/Septic Shock)  Goal: Absence of Infection Signs and Symptoms  Outcome: Ongoing, Progressing     Problem: Nutrition Impaired (Sepsis/Septic Shock)  Goal: Optimal Nutrition Intake  Outcome: Ongoing, Progressing     Problem: Fluid Imbalance (Pneumonia)  Goal: Fluid Balance  Outcome: Ongoing, Progressing     Problem: Infection (Pneumonia)  Goal: Resolution of Infection Signs and Symptoms  Outcome: Ongoing, Progressing     Problem:  Respiratory Compromise (Pneumonia)  Goal: Effective Oxygenation and Ventilation  Outcome: Ongoing, Progressing     Problem: Diabetes Comorbidity  Goal: Blood Glucose Level Within Targeted Range  Outcome: Ongoing, Progressing     Problem: Fall Injury Risk  Goal: Absence of Fall and Fall-Related Injury  Outcome: Ongoing, Progressing     Problem: Skin Injury Risk Increased  Goal: Skin Health and Integrity  Outcome: Ongoing, Progressing     Problem: Fluid Volume Deficit  Goal: Fluid Balance  Outcome: Ongoing, Progressing

## 2022-10-28 NOTE — SUBJECTIVE & OBJECTIVE
Interval History: Improved this morning, able to wean to 2L. Still with significant phlegm production, unable to expectorate. Did work with PT yesterday with good results.     Objective:     Vital Signs (Most Recent):  Temp: 97.4 °F (36.3 °C) (10/28/22 0751)  Pulse: 90 (10/28/22 0841)  Resp: 16 (10/28/22 0841)  BP: 128/82 (10/28/22 0751)  SpO2: (!) 94 % (10/28/22 0841)   Vital Signs (24h Range):  Temp:  [96.5 °F (35.8 °C)-99 °F (37.2 °C)] 97.4 °F (36.3 °C)  Pulse:  [] 90  Resp:  [14-20] 16  SpO2:  [90 %-99 %] 94 %  BP: ()/(46-82) 128/82     Weight: 47.2 kg (104 lb)  Body mass index is 17.31 kg/m².      Intake/Output Summary (Last 24 hours) at 10/28/2022 0844  Last data filed at 10/28/2022 0416  Gross per 24 hour   Intake 997.12 ml   Output 350 ml   Net 647.12 ml       Physical Exam  Constitutional:       General: She is not in acute distress.     Appearance: Normal appearance. She is not ill-appearing.   HENT:      Head: Atraumatic.      Nose: Nose normal.      Mouth/Throat:      Mouth: Mucous membranes are moist.   Eyes:      Pupils: Pupils are equal, round, and reactive to light.   Cardiovascular:      Rate and Rhythm: Normal rate and regular rhythm.      Pulses: Normal pulses.   Pulmonary:      Breath sounds: Wheezing and rales present.      Comments: Significant secretions heard along with weak cough. Diffuse end-expiratory wheezing and rhonchi throughout anterior chest. No tachypnea, no accessory muscle use.   Abdominal:      General: Abdomen is flat.   Musculoskeletal:         General: No swelling. Normal range of motion.      Cervical back: Normal range of motion. No rigidity.   Skin:     General: Skin is warm and dry.      Capillary Refill: Capillary refill takes less than 2 seconds.   Neurological:      General: No focal deficit present.      Mental Status: She is alert and oriented to person, place, and time.       Significant Labs:    CBC/Anemia Profile:  Recent Labs   Lab 10/27/22  9301  10/28/22  0453   WBC 20.72* 14.38*   HGB 12.0 10.4*   HCT 38.3 33.2*    192   MCV 95 94   RDW 14.0 13.9        Chemistries:  Recent Labs   Lab 10/27/22  0339 10/28/22  0454   * 138   K 4.8 3.5    103   CO2 20* 29   BUN 12 11   CREATININE 0.7 0.6   CALCIUM 7.6* 7.3*   MG 1.9 1.7   PHOS 2.0* 1.7*       All pertinent labs within the past 24 hours have been reviewed.    Significant Imaging:  I have reviewed all pertinent imaging results/findings within the past 24 hours.

## 2022-10-28 NOTE — NURSING
Updated Dr. Smith of patient's inability to void. Bladder scan 412. MD stated to perform straight cath on patient. Straight cath output 300cc.

## 2022-10-28 NOTE — PROGRESS NOTES
Pt's lack of urine output since 3 PM straight cath reviewed with Dr. Burton. Results of 88 ML bladder scan reviewed. Orders obtained or 500 ML LR bolus. IVF deemed not to be indicated at this time despite the PTs NPO status and drop in blood pressure earlier today. No new orders given, will continue to monitor.

## 2022-10-28 NOTE — PROGRESS NOTES
[facility]  Colorectal Surgery  Progress Note    Patient Name: Sussy Costa  MRN: [id number]  Admission Date: 10/25/2022  Hospital Length of Stay: 3 days  Attending Physician: Jass Holman MD    Subjective:     Interval History: Pt weaned down to 2 lpm this am. Doing well from a surgical standpoint. Still with ARDs. +phlegm production. Good pain control. +urinary retention    Post-Op Info:  Procedure(s) (LRB):  COLECTOMY, RIGHT (Right)   3 Days Post-Op      Medications:  Continuous Infusions:  Scheduled Meds:   albuterol-ipratropium  3 mL Nebulization Q4H    atorvastatin  20 mg Oral QHS    azithromycin  500 mg Oral Daily    cefTRIAXone (ROCEPHIN) IVPB  2 g Intravenous Q24H    enoxaparin  30 mg Subcutaneous Daily    fluticasone furoate-vilanteroL  1 puff Inhalation Daily    insulin detemir U-100  2 Units Subcutaneous BID    lactated ringers  500 mL Intravenous Once    latanoprost  1 drop Both Eyes QHS    metoprolol  5 mg Intravenous Q6H    montelukast  10 mg Oral QHS    sodium chloride 3%  4 mL Nebulization Q6H    sodium phosphate IVPB  39.99 mmol Intravenous Once    tamsulosin  0.8 mg Oral Daily    tiotropium bromide  2 puff Inhalation Daily     PRN Meds:   dextrose 10%    dextrose 10%    glucagon (human recombinant)    HYDROmorphone    influenza 65up-adj    insulin aspart U-100    naloxone    ondansetron    prochlorperazine    sodium chloride 0.9%        Objective:     Vital Signs (Most Recent):  Temp: 97.9 °F (36.6 °C) (10/28/22 1155)  Pulse: 95 (10/28/22 1155)  Resp: 20 (10/28/22 1155)  BP: (!) 102/55 (10/28/22 1155)  SpO2: (!) 90 % (10/28/22 1155)   Vital Signs (24h Range):  Temp:  [96.9 °F (36.1 °C)-99 °F (37.2 °C)] 97.9 °F (36.6 °C)  Pulse:  [] 95  Resp:  [14-20] 20  SpO2:  [90 %-99 %] 90 %  BP: ()/(46-82) 102/55     Intake/Output - Last 3 Shifts         10/26 0700  10/27 0659 10/27 0700  10/28 0659 10/28 0700  10/29 0659    P.O. 280 0     I.V. (mL/kg)       IV  Piggyback  997.1     Total Intake(mL/kg) 280 (5.9) 997.1 (21.1)     Urine (mL/kg/hr) 300 (0.3) 350 (0.3)     Stool  0     Blood       Total Output 300 350     Net -20 +647.1            Urine Occurrence 1 x      Stool Occurrence  0 x             Physical Exam  Vitals and nursing note reviewed.   Constitutional:       General: She is in acute distress.      Appearance: She is not toxic-appearing.   HENT:      Head: Normocephalic and atraumatic.      Nose: Nose normal.      Mouth/Throat:      Mouth: Mucous membranes are moist.   Eyes:      Extraocular Movements: Extraocular movements intact.      Pupils: Pupils are equal, round, and reactive to light.   Cardiovascular:      Rate and Rhythm: Normal rate.   Pulmonary:      Effort: Respiratory distress present.      Breath sounds: Rhonchi present.   Musculoskeletal:      Cervical back: Normal range of motion.   Skin:     General: Skin is warm and dry.      Capillary Refill: Capillary refill takes less than 2 seconds.   Neurological:      General: No focal deficit present.      Mental Status: She is alert.   Psychiatric:         Mood and Affect: Mood normal.         Behavior: Behavior normal.     Significant Labs:  BMP:   Recent Labs   Lab 10/28/22  0454   GLU 87      K 3.5      CO2 29   BUN 11   CREATININE 0.6   CALCIUM 7.3*   MG 1.7     CBC:   Recent Labs   Lab 10/28/22  0453   WBC 14.38*   RBC 3.52*   HGB 10.4*   HCT 33.2*      MCV 94   MCH 29.5   MCHC 31.3*       Significant Diagnostics:  None    Assessment/Plan:     * Colonic mass  87 y.o. F dx w LBO due to colon mass s/p R colectomy post op day 3. Doing well post surgical standpoint, however postoperative course complicated by dyspnea with right middle lobe atelectasis.     1) awaiting BM  2) Bladder scans for urinary retention  3) Appreciate pulmonary input for respiratory disease  4) Continue pain control  5) NPO due to UGI sx and gas bubble seen on imaging  6) continue metoprolol if BP  stable        Natalie Sandoval NP  Colorectal Surgery  [facility]       No

## 2022-10-29 LAB
ANION GAP SERPL CALC-SCNC: 13 MMOL/L (ref 8–16)
BASOPHILS # BLD AUTO: 0.02 K/UL (ref 0–0.2)
BASOPHILS NFR BLD: 0.2 % (ref 0–1.9)
BUN SERPL-MCNC: 12 MG/DL (ref 8–23)
CALCIUM SERPL-MCNC: 7.5 MG/DL (ref 8.7–10.5)
CHLORIDE SERPL-SCNC: 101 MMOL/L (ref 95–110)
CO2 SERPL-SCNC: 25 MMOL/L (ref 23–29)
CREAT SERPL-MCNC: 0.6 MG/DL (ref 0.5–1.4)
CRP SERPL-MCNC: 192.8 MG/L (ref 0–8.2)
DIFFERENTIAL METHOD: ABNORMAL
EOSINOPHIL # BLD AUTO: 0.4 K/UL (ref 0–0.5)
EOSINOPHIL NFR BLD: 3.6 % (ref 0–8)
ERYTHROCYTE [DISTWIDTH] IN BLOOD BY AUTOMATED COUNT: 14 % (ref 11.5–14.5)
EST. GFR  (NO RACE VARIABLE): >60 ML/MIN/1.73 M^2
GLUCOSE SERPL-MCNC: 107 MG/DL (ref 70–110)
HCT VFR BLD AUTO: 32.6 % (ref 37–48.5)
HGB BLD-MCNC: 10.2 G/DL (ref 12–16)
IMM GRANULOCYTES # BLD AUTO: 0.04 K/UL (ref 0–0.04)
IMM GRANULOCYTES NFR BLD AUTO: 0.4 % (ref 0–0.5)
LYMPHOCYTES # BLD AUTO: 0.6 K/UL (ref 1–4.8)
LYMPHOCYTES NFR BLD: 5.3 % (ref 18–48)
MAGNESIUM SERPL-MCNC: 2.2 MG/DL (ref 1.6–2.6)
MCH RBC QN AUTO: 28.9 PG (ref 27–31)
MCHC RBC AUTO-ENTMCNC: 31.3 G/DL (ref 32–36)
MCV RBC AUTO: 92 FL (ref 82–98)
MONOCYTES # BLD AUTO: 0.7 K/UL (ref 0.3–1)
MONOCYTES NFR BLD: 6.8 % (ref 4–15)
NEUTROPHILS # BLD AUTO: 8.8 K/UL (ref 1.8–7.7)
NEUTROPHILS NFR BLD: 83.7 % (ref 38–73)
NRBC BLD-RTO: 0 /100 WBC
PHOSPHATE SERPL-MCNC: 2.1 MG/DL (ref 2.7–4.5)
PLATELET # BLD AUTO: 203 K/UL (ref 150–450)
PMV BLD AUTO: 12 FL (ref 9.2–12.9)
POCT GLUCOSE: 102 MG/DL (ref 70–110)
POCT GLUCOSE: 108 MG/DL (ref 70–110)
POCT GLUCOSE: 109 MG/DL (ref 70–110)
POTASSIUM SERPL-SCNC: 3.6 MMOL/L (ref 3.5–5.1)
RBC # BLD AUTO: 3.53 M/UL (ref 4–5.4)
SODIUM SERPL-SCNC: 139 MMOL/L (ref 136–145)
WBC # BLD AUTO: 10.52 K/UL (ref 3.9–12.7)

## 2022-10-29 PROCEDURE — 25000003 PHARM REV CODE 250: Performed by: STUDENT IN AN ORGANIZED HEALTH CARE EDUCATION/TRAINING PROGRAM

## 2022-10-29 PROCEDURE — 27000221 HC OXYGEN, UP TO 24 HOURS

## 2022-10-29 PROCEDURE — 84100 ASSAY OF PHOSPHORUS: CPT | Performed by: STUDENT IN AN ORGANIZED HEALTH CARE EDUCATION/TRAINING PROGRAM

## 2022-10-29 PROCEDURE — 25000003 PHARM REV CODE 250

## 2022-10-29 PROCEDURE — 63600175 PHARM REV CODE 636 W HCPCS: Performed by: STUDENT IN AN ORGANIZED HEALTH CARE EDUCATION/TRAINING PROGRAM

## 2022-10-29 PROCEDURE — 20600001 HC STEP DOWN PRIVATE ROOM

## 2022-10-29 PROCEDURE — 94761 N-INVAS EAR/PLS OXIMETRY MLT: CPT

## 2022-10-29 PROCEDURE — 94799 UNLISTED PULMONARY SVC/PX: CPT

## 2022-10-29 PROCEDURE — 25000242 PHARM REV CODE 250 ALT 637 W/ HCPCS: Performed by: INTERNAL MEDICINE

## 2022-10-29 PROCEDURE — 85025 COMPLETE CBC W/AUTO DIFF WBC: CPT | Performed by: STUDENT IN AN ORGANIZED HEALTH CARE EDUCATION/TRAINING PROGRAM

## 2022-10-29 PROCEDURE — 99900026 HC AIRWAY MAINTENANCE (STAT)

## 2022-10-29 PROCEDURE — 63600175 PHARM REV CODE 636 W HCPCS: Performed by: INTERNAL MEDICINE

## 2022-10-29 PROCEDURE — 80048 BASIC METABOLIC PNL TOTAL CA: CPT | Performed by: STUDENT IN AN ORGANIZED HEALTH CARE EDUCATION/TRAINING PROGRAM

## 2022-10-29 PROCEDURE — 63700000 PHARM REV CODE 250 ALT 637 W/O HCPCS: Performed by: INTERNAL MEDICINE

## 2022-10-29 PROCEDURE — 94668 MNPJ CHEST WALL SBSQ: CPT

## 2022-10-29 PROCEDURE — 36415 COLL VENOUS BLD VENIPUNCTURE: CPT | Performed by: STUDENT IN AN ORGANIZED HEALTH CARE EDUCATION/TRAINING PROGRAM

## 2022-10-29 PROCEDURE — 94640 AIRWAY INHALATION TREATMENT: CPT

## 2022-10-29 PROCEDURE — 83735 ASSAY OF MAGNESIUM: CPT | Performed by: STUDENT IN AN ORGANIZED HEALTH CARE EDUCATION/TRAINING PROGRAM

## 2022-10-29 PROCEDURE — 86140 C-REACTIVE PROTEIN: CPT | Performed by: STUDENT IN AN ORGANIZED HEALTH CARE EDUCATION/TRAINING PROGRAM

## 2022-10-29 PROCEDURE — 51798 US URINE CAPACITY MEASURE: CPT

## 2022-10-29 PROCEDURE — 99900035 HC TECH TIME PER 15 MIN (STAT)

## 2022-10-29 PROCEDURE — 94664 DEMO&/EVAL PT USE INHALER: CPT

## 2022-10-29 RX ADMIN — IPRATROPIUM BROMIDE AND ALBUTEROL SULFATE 3 ML: 2.5; .5 SOLUTION RESPIRATORY (INHALATION) at 04:10

## 2022-10-29 RX ADMIN — METOROPROLOL TARTRATE 5 MG: 5 INJECTION, SOLUTION INTRAVENOUS at 11:10

## 2022-10-29 RX ADMIN — INSULIN DETEMIR 2 UNITS: 100 INJECTION, SOLUTION SUBCUTANEOUS at 08:10

## 2022-10-29 RX ADMIN — ATORVASTATIN CALCIUM 20 MG: 20 TABLET, FILM COATED ORAL at 08:10

## 2022-10-29 RX ADMIN — IPRATROPIUM BROMIDE AND ALBUTEROL SULFATE 3 ML: 2.5; .5 SOLUTION RESPIRATORY (INHALATION) at 09:10

## 2022-10-29 RX ADMIN — TIOTROPIUM BROMIDE INHALATION SPRAY 2 PUFF: 3.12 SPRAY, METERED RESPIRATORY (INHALATION) at 01:10

## 2022-10-29 RX ADMIN — LATANOPROST 1 DROP: 50 SOLUTION OPHTHALMIC at 09:10

## 2022-10-29 RX ADMIN — SODIUM CHLORIDE SOLN NEBU 3% 4 ML: 3 NEBU SOLN at 09:10

## 2022-10-29 RX ADMIN — METOROPROLOL TARTRATE 5 MG: 5 INJECTION, SOLUTION INTRAVENOUS at 12:10

## 2022-10-29 RX ADMIN — SODIUM CHLORIDE SOLN NEBU 3% 4 ML: 3 NEBU SOLN at 01:10

## 2022-10-29 RX ADMIN — IPRATROPIUM BROMIDE AND ALBUTEROL SULFATE 3 ML: 2.5; .5 SOLUTION RESPIRATORY (INHALATION) at 01:10

## 2022-10-29 RX ADMIN — IPRATROPIUM BROMIDE AND ALBUTEROL SULFATE 3 ML: 2.5; .5 SOLUTION RESPIRATORY (INHALATION) at 08:10

## 2022-10-29 RX ADMIN — HYDROMORPHONE HYDROCHLORIDE 0.2 MG: 1 INJECTION, SOLUTION INTRAMUSCULAR; INTRAVENOUS; SUBCUTANEOUS at 10:10

## 2022-10-29 RX ADMIN — CEFTRIAXONE 2 G: 2 INJECTION, SOLUTION INTRAVENOUS at 12:10

## 2022-10-29 RX ADMIN — METOROPROLOL TARTRATE 5 MG: 5 INJECTION, SOLUTION INTRAVENOUS at 05:10

## 2022-10-29 RX ADMIN — FLUTICASONE FUROATE AND VILANTEROL TRIFENATATE 1 PUFF: 200; 25 POWDER RESPIRATORY (INHALATION) at 09:10

## 2022-10-29 RX ADMIN — MONTELUKAST 10 MG: 10 TABLET, FILM COATED ORAL at 08:10

## 2022-10-29 RX ADMIN — METOROPROLOL TARTRATE 5 MG: 5 INJECTION, SOLUTION INTRAVENOUS at 06:10

## 2022-10-29 RX ADMIN — HYDROMORPHONE HYDROCHLORIDE 0.2 MG: 1 INJECTION, SOLUTION INTRAMUSCULAR; INTRAVENOUS; SUBCUTANEOUS at 05:10

## 2022-10-29 RX ADMIN — INSULIN DETEMIR 2 UNITS: 100 INJECTION, SOLUTION SUBCUTANEOUS at 09:10

## 2022-10-29 RX ADMIN — SODIUM CHLORIDE SOLN NEBU 3% 4 ML: 3 NEBU SOLN at 08:10

## 2022-10-29 RX ADMIN — AZITHROMYCIN MONOHYDRATE 500 MG: 250 TABLET ORAL at 09:10

## 2022-10-29 RX ADMIN — HYDROMORPHONE HYDROCHLORIDE 0.2 MG: 1 INJECTION, SOLUTION INTRAMUSCULAR; INTRAVENOUS; SUBCUTANEOUS at 12:10

## 2022-10-29 RX ADMIN — TAMSULOSIN HYDROCHLORIDE 0.8 MG: 0.4 CAPSULE ORAL at 09:10

## 2022-10-29 RX ADMIN — ENOXAPARIN SODIUM 30 MG: 100 INJECTION SUBCUTANEOUS at 04:10

## 2022-10-29 NOTE — SUBJECTIVE & OBJECTIVE
Subjective:     Interval History: NAEO.  On 2L NC.  No bowel movements and not passing gas.  Pain well controlled.      Post-Op Info:  Procedure(s) (LRB):  COLECTOMY, RIGHT (Right)   4 Days Post-Op      Medications:  Continuous Infusions:  Scheduled Meds:   albuterol-ipratropium  3 mL Nebulization Q4H    atorvastatin  20 mg Oral QHS    azithromycin  500 mg Oral Daily    cefTRIAXone (ROCEPHIN) IVPB  2 g Intravenous Q24H    enoxaparin  30 mg Subcutaneous Daily    fluticasone furoate-vilanteroL  1 puff Inhalation Daily    insulin detemir U-100  2 Units Subcutaneous BID    lactated ringers  500 mL Intravenous Once    latanoprost  1 drop Both Eyes QHS    metoprolol  5 mg Intravenous Q6H    montelukast  10 mg Oral QHS    sodium chloride 3%  4 mL Nebulization Q6H    tamsulosin  0.8 mg Oral Daily    tiotropium bromide  2 puff Inhalation Daily     PRN Meds:   dextrose 10%    dextrose 10%    glucagon (human recombinant)    HYDROmorphone    influenza 65up-adj    insulin aspart U-100    naloxone    ondansetron    prochlorperazine    sodium chloride 0.9%        Objective:     Vital Signs (Most Recent):  Temp: 98 °F (36.7 °C) (10/29/22 0700)  Pulse: 95 (10/29/22 0909)  Resp: 18 (10/29/22 0909)  BP: 133/68 (10/29/22 0700)  SpO2: 98 % (10/29/22 0853) Vital Signs (24h Range):  Temp:  [97.2 °F (36.2 °C)-98.2 °F (36.8 °C)] 98 °F (36.7 °C)  Pulse:  [] 95  Resp:  [12-20] 18  SpO2:  [90 %-99 %] 98 %  BP: (102-135)/(55-68) 133/68     Intake/Output - Last 3 Shifts         10/27 0700  10/28 0659 10/28 0700  10/29 0659 10/29 0700  10/30 0659    P.O. 0 0     IV Piggyback 997.1      Total Intake(mL/kg) 997.1 (21.1) 0 (0)     Urine (mL/kg/hr) 350 (0.3) 450 (0.4)     Stool 0 0     Total Output 350 450     Net +647.1 -450            Urine Occurrence  1 x     Stool Occurrence 0 x 0 x             Physical Exam  Vitals and nursing note reviewed.   Constitutional:       Appearance: She is not toxic-appearing.   HENT:      Head: Normocephalic  and atraumatic.      Nose: Nose normal.      Mouth/Throat:      Mouth: Mucous membranes are moist.   Eyes:      Extraocular Movements: Extraocular movements intact.      Pupils: Pupils are equal, round, and reactive to light.   Cardiovascular:      Rate and Rhythm: Normal rate.   Pulmonary:      Effort: No respiratory distress.      Breath sounds: Rhonchi present.   Abdominal:      Comments: No TTP    Musculoskeletal:      Cervical back: Normal range of motion.   Skin:     General: Skin is warm and dry.      Capillary Refill: Capillary refill takes less than 2 seconds.   Neurological:      General: No focal deficit present.      Mental Status: She is alert.   Psychiatric:         Mood and Affect: Mood normal.         Behavior: Behavior normal.           Significant Labs:  BMP:   Recent Labs   Lab 10/29/22  0445         K 3.6      CO2 25   BUN 12   CREATININE 0.6   CALCIUM 7.5*   MG 2.2     CBC (Last 3 Results):   Recent Labs   Lab 10/27/22  0339 10/28/22  0453 10/29/22  0445   WBC 20.72* 14.38* 10.52   RBC 4.05 3.52* 3.53*   HGB 12.0 10.4* 10.2*   HCT 38.3 33.2* 32.6*    192 203   MCV 95 94 92   MCH 29.6 29.5 28.9   MCHC 31.3* 31.3* 31.3*     CMP (Last 3 Results):   Recent Labs   Lab 10/24/22  2333 10/26/22  0424 10/27/22  0339 10/28/22  0454 10/29/22  0445   *   < > 157* 87 107   CALCIUM 9.0   < > 7.6* 7.3* 7.5*   ALBUMIN 3.3*  --   --   --   --    PROT 6.3  --   --   --   --    *   < > 135* 138 139   K 3.7   < > 4.8 3.5 3.6   CO2 24   < > 20* 29 25      < > 104 103 101   BUN 21   < > 12 11 12   CREATININE 0.7   < > 0.7 0.6 0.6   ALKPHOS 48*  --   --   --   --    ALT 60*  --   --   --   --    AST 56*  --   --   --   --    BILITOT 0.4  --   --   --   --     < > = values in this interval not displayed.     CRP (Last 3 Results):   Recent Labs   Lab 10/28/22  0454 10/29/22  0445   .3* 192.8*     All pertinent lab results within the last 24 hours have been reviewed.      Significant Diagnostics:  I have reviewed all pertinent imaging results/findings within the past 24 hours.

## 2022-10-29 NOTE — PLAN OF CARE
Pt aox4 on 2L NC, patient verbalizes understanding of POC.    Problem: Infection  Goal: Absence of Infection Signs and Symptoms  Outcome: Ongoing, Progressing     Problem: Adult Inpatient Plan of Care  Goal: Plan of Care Review  Outcome: Ongoing, Progressing  Goal: Patient-Specific Goal (Individualized)  Outcome: Ongoing, Progressing  Goal: Absence of Hospital-Acquired Illness or Injury  Outcome: Ongoing, Progressing  Goal: Optimal Comfort and Wellbeing  Outcome: Ongoing, Progressing  Goal: Readiness for Transition of Care  Outcome: Ongoing, Progressing     Problem: Adjustment to Illness (Sepsis/Septic Shock)  Goal: Optimal Coping  Outcome: Ongoing, Progressing     Problem: Bleeding (Sepsis/Septic Shock)  Goal: Absence of Bleeding  Outcome: Ongoing, Progressing     Problem: Glycemic Control Impaired (Sepsis/Septic Shock)  Goal: Blood Glucose Level Within Desired Range  Outcome: Ongoing, Progressing     Problem: Infection Progression (Sepsis/Septic Shock)  Goal: Absence of Infection Signs and Symptoms  Outcome: Ongoing, Progressing     Problem: Nutrition Impaired (Sepsis/Septic Shock)  Goal: Optimal Nutrition Intake  Outcome: Ongoing, Progressing     Problem: Fluid Imbalance (Pneumonia)  Goal: Fluid Balance  Outcome: Ongoing, Progressing     Problem: Infection (Pneumonia)  Goal: Resolution of Infection Signs and Symptoms  Outcome: Ongoing, Progressing     Problem: Respiratory Compromise (Pneumonia)  Goal: Effective Oxygenation and Ventilation  Outcome: Ongoing, Progressing     Problem: Diabetes Comorbidity  Goal: Blood Glucose Level Within Targeted Range  Outcome: Ongoing, Progressing     Problem: Fall Injury Risk  Goal: Absence of Fall and Fall-Related Injury  Outcome: Ongoing, Progressing     Problem: Skin Injury Risk Increased  Goal: Skin Health and Integrity  Outcome: Ongoing, Progressing     Problem: Fluid Volume Deficit  Goal: Fluid Balance  Outcome: Ongoing, Progressing

## 2022-10-29 NOTE — ASSESSMENT & PLAN NOTE
87 y.o. F dx w LBO due to colon mass s/p R colectomy post op day 3. Doing well post surgical standpoint, however postoperative course complicated by dyspnea with right middle lobe atelectasis.     1) awaiting BM  2) Bladder scans for urinary retention  3) Appreciate pulmonary input for respiratory disease  4) Continue pain control  5) Advance diet to Boost breeze   6) continue metoprolol if BP stable   [FreeTextEntry2] : Bryon is a 4-year-8-month boy being followed for growth. He was first evaluated in August, 2018 at which time his growth chart showed negligible linear growth over the past year (1646-0037), however his weight gain had been consistently, and measurements at our office indicated he was at the 3rd percentile for height and 11th percentile for weight. Our impression was that the point at 3 yrs was not accurate\par He has been well.  Mother has no concerns about his development\par Mid parental height 68 inches.

## 2022-10-29 NOTE — PROGRESS NOTES
[facility]  Colorectal Surgery  Progress Note    Patient Name: Sussy Costa  MRN: [id number]  Admission Date: 10/25/2022  Hospital Length of Stay: 4 days  Attending Physician: Jass Holman MD    Subjective:     Interval History: NAEO.  On 2L NC.  No bowel movements and not passing gas.  Pain well controlled.      Post-Op Info:  Procedure(s) (LRB):  COLECTOMY, RIGHT (Right)   4 Days Post-Op      Medications:  Continuous Infusions:  Scheduled Meds:   albuterol-ipratropium  3 mL Nebulization Q4H    atorvastatin  20 mg Oral QHS    azithromycin  500 mg Oral Daily    cefTRIAXone (ROCEPHIN) IVPB  2 g Intravenous Q24H    enoxaparin  30 mg Subcutaneous Daily    fluticasone furoate-vilanteroL  1 puff Inhalation Daily    insulin detemir U-100  2 Units Subcutaneous BID    lactated ringers  500 mL Intravenous Once    latanoprost  1 drop Both Eyes QHS    metoprolol  5 mg Intravenous Q6H    montelukast  10 mg Oral QHS    sodium chloride 3%  4 mL Nebulization Q6H    tamsulosin  0.8 mg Oral Daily    tiotropium bromide  2 puff Inhalation Daily     PRN Meds:   dextrose 10%    dextrose 10%    glucagon (human recombinant)    HYDROmorphone    influenza 65up-adj    insulin aspart U-100    naloxone    ondansetron    prochlorperazine    sodium chloride 0.9%        Objective:     Vital Signs (Most Recent):  Temp: 98 °F (36.7 °C) (10/29/22 0700)  Pulse: 95 (10/29/22 0909)  Resp: 18 (10/29/22 0909)  BP: 133/68 (10/29/22 0700)  SpO2: 98 % (10/29/22 0853) Vital Signs (24h Range):  Temp:  [97.2 °F (36.2 °C)-98.2 °F (36.8 °C)] 98 °F (36.7 °C)  Pulse:  [] 95  Resp:  [12-20] 18  SpO2:  [90 %-99 %] 98 %  BP: (102-135)/(55-68) 133/68     Intake/Output - Last 3 Shifts         10/27 0700  10/28 0659 10/28 0700  10/29 0659 10/29 0700  10/30 0659    P.O. 0 0     IV Piggyback 997.1      Total Intake(mL/kg) 997.1 (21.1) 0 (0)     Urine (mL/kg/hr) 350 (0.3) 450 (0.4)     Stool 0 0     Total Output 350 450     Net  +647.1 -450            Urine Occurrence  1 x     Stool Occurrence 0 x 0 x             Physical Exam  Vitals and nursing note reviewed.   Constitutional:       Appearance: She is not toxic-appearing.   HENT:      Head: Normocephalic and atraumatic.      Nose: Nose normal.      Mouth/Throat:      Mouth: Mucous membranes are moist.   Eyes:      Extraocular Movements: Extraocular movements intact.      Pupils: Pupils are equal, round, and reactive to light.   Cardiovascular:      Rate and Rhythm: Normal rate.   Pulmonary:      Effort: No respiratory distress.      Breath sounds: Rhonchi present.   Abdominal:      Comments: No TTP    Musculoskeletal:      Cervical back: Normal range of motion.   Skin:     General: Skin is warm and dry.      Capillary Refill: Capillary refill takes less than 2 seconds.   Neurological:      General: No focal deficit present.      Mental Status: She is alert.   Psychiatric:         Mood and Affect: Mood normal.         Behavior: Behavior normal.           Significant Labs:  BMP:   Recent Labs   Lab 10/29/22  0445         K 3.6      CO2 25   BUN 12   CREATININE 0.6   CALCIUM 7.5*   MG 2.2     CBC (Last 3 Results):   Recent Labs   Lab 10/27/22  0339 10/28/22  0453 10/29/22  0445   WBC 20.72* 14.38* 10.52   RBC 4.05 3.52* 3.53*   HGB 12.0 10.4* 10.2*   HCT 38.3 33.2* 32.6*    192 203   MCV 95 94 92   MCH 29.6 29.5 28.9   MCHC 31.3* 31.3* 31.3*     CMP (Last 3 Results):   Recent Labs   Lab 10/24/22  2333 10/26/22  0424 10/27/22  0339 10/28/22  0454 10/29/22  0445   *   < > 157* 87 107   CALCIUM 9.0   < > 7.6* 7.3* 7.5*   ALBUMIN 3.3*  --   --   --   --    PROT 6.3  --   --   --   --    *   < > 135* 138 139   K 3.7   < > 4.8 3.5 3.6   CO2 24   < > 20* 29 25      < > 104 103 101   BUN 21   < > 12 11 12   CREATININE 0.7   < > 0.7 0.6 0.6   ALKPHOS 48*  --   --   --   --    ALT 60*  --   --   --   --    AST 56*  --   --   --   --    BILITOT 0.4  --   --    --   --     < > = values in this interval not displayed.     CRP (Last 3 Results):   Recent Labs   Lab 10/28/22  0454 10/29/22  0445   .3* 192.8*     All pertinent lab results within the last 24 hours have been reviewed.     Significant Diagnostics:  I have reviewed all pertinent imaging results/findings within the past 24 hours.    Assessment/Plan:     * Colonic mass  87 y.o. F dx w LBO due to colon mass s/p R colectomy post op day 3. Doing well post surgical standpoint, however postoperative course complicated by dyspnea with right middle lobe atelectasis.     1) awaiting BM  2) Bladder scans for urinary retention  3) Appreciate pulmonary input for respiratory disease  4) Continue pain control  5) Advance diet to Boost breeze   6) continue metoprolol if BP stable          Megan Xie MD  Colorectal Surgery  [facility]

## 2022-10-29 NOTE — PLAN OF CARE
Plan of care reviewed with patient, who verbalized understanding. Pt describes pain as well controlled without regular use of narcotic pain medications. Pt is able to turn and position themselves, and has gotten out of bed, sat up in the chair and ambulated in the hallways. Pt is tolerating their NPO status without nausea, but has not had a bowel movement since 10/23. Urine is being voided easier after taking Flomax, but concerns for dehydration remain. Pt is using their incentive spirometer with encouragement. Additional use of the incentive spirometer and ambulation in the halls should be encouraged this morning after a restful sleep.       Problem: Infection  Goal: Absence of Infection Signs and Symptoms  Outcome: Ongoing, Progressing     Problem: Adult Inpatient Plan of Care  Goal: Plan of Care Review  Outcome: Ongoing, Progressing  Goal: Patient-Specific Goal (Individualized)  Outcome: Ongoing, Progressing  Goal: Absence of Hospital-Acquired Illness or Injury  Outcome: Ongoing, Progressing  Goal: Optimal Comfort and Wellbeing  Outcome: Ongoing, Progressing  Goal: Readiness for Transition of Care  Outcome: Ongoing, Progressing     Problem: Adjustment to Illness (Sepsis/Septic Shock)  Goal: Optimal Coping  Outcome: Ongoing, Progressing     Problem: Bleeding (Sepsis/Septic Shock)  Goal: Absence of Bleeding  Outcome: Ongoing, Progressing     Problem: Glycemic Control Impaired (Sepsis/Septic Shock)  Goal: Blood Glucose Level Within Desired Range  Outcome: Ongoing, Progressing     Problem: Infection Progression (Sepsis/Septic Shock)  Goal: Absence of Infection Signs and Symptoms  Outcome: Ongoing, Progressing     Problem: Nutrition Impaired (Sepsis/Septic Shock)  Goal: Optimal Nutrition Intake  Outcome: Ongoing, Progressing     Problem: Fluid Imbalance (Pneumonia)  Goal: Fluid Balance  Outcome: Ongoing, Progressing     Problem: Infection (Pneumonia)  Goal: Resolution of Infection Signs and Symptoms  Outcome: Ongoing,  Progressing     Problem: Respiratory Compromise (Pneumonia)  Goal: Effective Oxygenation and Ventilation  Outcome: Ongoing, Progressing     Problem: Diabetes Comorbidity  Goal: Blood Glucose Level Within Targeted Range  Outcome: Ongoing, Progressing     Problem: Fall Injury Risk  Goal: Absence of Fall and Fall-Related Injury  Outcome: Ongoing, Progressing     Problem: Skin Injury Risk Increased  Goal: Skin Health and Integrity  Outcome: Ongoing, Progressing     Problem: Fluid Volume Deficit  Goal: Fluid Balance  Outcome: Ongoing, Progressing

## 2022-10-30 LAB
ALBUMIN SERPL BCP-MCNC: 2.1 G/DL (ref 3.5–5.2)
ALP SERPL-CCNC: 57 U/L (ref 55–135)
ALT SERPL W/O P-5'-P-CCNC: 16 U/L (ref 10–44)
ANION GAP SERPL CALC-SCNC: 17 MMOL/L (ref 8–16)
ANION GAP SERPL CALC-SCNC: 7 MMOL/L (ref 8–16)
AST SERPL-CCNC: 11 U/L (ref 10–40)
BASOPHILS # BLD AUTO: 0.04 K/UL (ref 0–0.2)
BASOPHILS NFR BLD: 0.4 % (ref 0–1.9)
BILIRUB SERPL-MCNC: 0.5 MG/DL (ref 0.1–1)
BUN SERPL-MCNC: 11 MG/DL (ref 8–23)
BUN SERPL-MCNC: 13 MG/DL (ref 8–23)
CALCIUM SERPL-MCNC: 7.7 MG/DL (ref 8.7–10.5)
CALCIUM SERPL-MCNC: 8 MG/DL (ref 8.7–10.5)
CHLORIDE SERPL-SCNC: 100 MMOL/L (ref 95–110)
CHLORIDE SERPL-SCNC: 100 MMOL/L (ref 95–110)
CO2 SERPL-SCNC: 21 MMOL/L (ref 23–29)
CO2 SERPL-SCNC: 31 MMOL/L (ref 23–29)
CREAT SERPL-MCNC: 0.6 MG/DL (ref 0.5–1.4)
CREAT SERPL-MCNC: 0.6 MG/DL (ref 0.5–1.4)
CRP SERPL-MCNC: 153.6 MG/L (ref 0–8.2)
DIFFERENTIAL METHOD: ABNORMAL
EOSINOPHIL # BLD AUTO: 0.8 K/UL (ref 0–0.5)
EOSINOPHIL NFR BLD: 7.9 % (ref 0–8)
ERYTHROCYTE [DISTWIDTH] IN BLOOD BY AUTOMATED COUNT: 13.7 % (ref 11.5–14.5)
ERYTHROCYTE [DISTWIDTH] IN BLOOD BY AUTOMATED COUNT: 13.8 % (ref 11.5–14.5)
EST. GFR  (NO RACE VARIABLE): >60 ML/MIN/1.73 M^2
EST. GFR  (NO RACE VARIABLE): >60 ML/MIN/1.73 M^2
GLUCOSE SERPL-MCNC: 136 MG/DL (ref 70–110)
GLUCOSE SERPL-MCNC: 268 MG/DL (ref 70–110)
HCT VFR BLD AUTO: 35.5 % (ref 37–48.5)
HCT VFR BLD AUTO: 36 % (ref 37–48.5)
HGB BLD-MCNC: 10.8 G/DL (ref 12–16)
HGB BLD-MCNC: 11.6 G/DL (ref 12–16)
IMM GRANULOCYTES # BLD AUTO: 0.05 K/UL (ref 0–0.04)
IMM GRANULOCYTES NFR BLD AUTO: 0.5 % (ref 0–0.5)
LYMPHOCYTES # BLD AUTO: 0.5 K/UL (ref 1–4.8)
LYMPHOCYTES NFR BLD: 5 % (ref 18–48)
MAGNESIUM SERPL-MCNC: 1.6 MG/DL (ref 1.6–2.6)
MAGNESIUM SERPL-MCNC: 1.9 MG/DL (ref 1.6–2.6)
MCH RBC QN AUTO: 28.8 PG (ref 27–31)
MCH RBC QN AUTO: 29.2 PG (ref 27–31)
MCHC RBC AUTO-ENTMCNC: 30.4 G/DL (ref 32–36)
MCHC RBC AUTO-ENTMCNC: 32.2 G/DL (ref 32–36)
MCV RBC AUTO: 91 FL (ref 82–98)
MCV RBC AUTO: 95 FL (ref 82–98)
MONOCYTES # BLD AUTO: 0.9 K/UL (ref 0.3–1)
MONOCYTES NFR BLD: 8.8 % (ref 4–15)
NEUTROPHILS # BLD AUTO: 7.5 K/UL (ref 1.8–7.7)
NEUTROPHILS NFR BLD: 77.4 % (ref 38–73)
NRBC BLD-RTO: 0 /100 WBC
PHOSPHATE SERPL-MCNC: 1.5 MG/DL (ref 2.7–4.5)
PHOSPHATE SERPL-MCNC: 1.9 MG/DL (ref 2.7–4.5)
PLATELET # BLD AUTO: 237 K/UL (ref 150–450)
PLATELET # BLD AUTO: 242 K/UL (ref 150–450)
PMV BLD AUTO: 10.7 FL (ref 9.2–12.9)
PMV BLD AUTO: 11.7 FL (ref 9.2–12.9)
POCT GLUCOSE: 125 MG/DL (ref 70–110)
POCT GLUCOSE: 126 MG/DL (ref 70–110)
POCT GLUCOSE: 203 MG/DL (ref 70–110)
POCT GLUCOSE: 251 MG/DL (ref 70–110)
POCT GLUCOSE: 254 MG/DL (ref 70–110)
POCT GLUCOSE: 269 MG/DL (ref 70–110)
POTASSIUM SERPL-SCNC: 3.7 MMOL/L (ref 3.5–5.1)
POTASSIUM SERPL-SCNC: 3.7 MMOL/L (ref 3.5–5.1)
PROT SERPL-MCNC: 5.1 G/DL (ref 6–8.4)
RBC # BLD AUTO: 3.75 M/UL (ref 4–5.4)
RBC # BLD AUTO: 3.97 M/UL (ref 4–5.4)
SODIUM SERPL-SCNC: 138 MMOL/L (ref 136–145)
SODIUM SERPL-SCNC: 138 MMOL/L (ref 136–145)
WBC # BLD AUTO: 12.19 K/UL (ref 3.9–12.7)
WBC # BLD AUTO: 9.74 K/UL (ref 3.9–12.7)

## 2022-10-30 PROCEDURE — 80053 COMPREHEN METABOLIC PANEL: CPT | Performed by: STUDENT IN AN ORGANIZED HEALTH CARE EDUCATION/TRAINING PROGRAM

## 2022-10-30 PROCEDURE — 84100 ASSAY OF PHOSPHORUS: CPT | Mod: 91 | Performed by: STUDENT IN AN ORGANIZED HEALTH CARE EDUCATION/TRAINING PROGRAM

## 2022-10-30 PROCEDURE — 25000003 PHARM REV CODE 250: Performed by: STUDENT IN AN ORGANIZED HEALTH CARE EDUCATION/TRAINING PROGRAM

## 2022-10-30 PROCEDURE — 94640 AIRWAY INHALATION TREATMENT: CPT

## 2022-10-30 PROCEDURE — 25000242 PHARM REV CODE 250 ALT 637 W/ HCPCS

## 2022-10-30 PROCEDURE — 99900035 HC TECH TIME PER 15 MIN (STAT)

## 2022-10-30 PROCEDURE — 84100 ASSAY OF PHOSPHORUS: CPT | Performed by: STUDENT IN AN ORGANIZED HEALTH CARE EDUCATION/TRAINING PROGRAM

## 2022-10-30 PROCEDURE — 99900026 HC AIRWAY MAINTENANCE (STAT)

## 2022-10-30 PROCEDURE — 83735 ASSAY OF MAGNESIUM: CPT | Mod: 91 | Performed by: STUDENT IN AN ORGANIZED HEALTH CARE EDUCATION/TRAINING PROGRAM

## 2022-10-30 PROCEDURE — 83735 ASSAY OF MAGNESIUM: CPT | Performed by: STUDENT IN AN ORGANIZED HEALTH CARE EDUCATION/TRAINING PROGRAM

## 2022-10-30 PROCEDURE — 25000003 PHARM REV CODE 250

## 2022-10-30 PROCEDURE — 86140 C-REACTIVE PROTEIN: CPT | Performed by: STUDENT IN AN ORGANIZED HEALTH CARE EDUCATION/TRAINING PROGRAM

## 2022-10-30 PROCEDURE — 51702 INSERT TEMP BLADDER CATH: CPT

## 2022-10-30 PROCEDURE — 20600001 HC STEP DOWN PRIVATE ROOM

## 2022-10-30 PROCEDURE — 25000242 PHARM REV CODE 250 ALT 637 W/ HCPCS: Performed by: INTERNAL MEDICINE

## 2022-10-30 PROCEDURE — 93010 ELECTROCARDIOGRAM REPORT: CPT | Mod: ,,, | Performed by: INTERNAL MEDICINE

## 2022-10-30 PROCEDURE — 63700000 PHARM REV CODE 250 ALT 637 W/O HCPCS: Performed by: INTERNAL MEDICINE

## 2022-10-30 PROCEDURE — 51798 US URINE CAPACITY MEASURE: CPT

## 2022-10-30 PROCEDURE — 85027 COMPLETE CBC AUTOMATED: CPT | Performed by: STUDENT IN AN ORGANIZED HEALTH CARE EDUCATION/TRAINING PROGRAM

## 2022-10-30 PROCEDURE — 36415 COLL VENOUS BLD VENIPUNCTURE: CPT | Performed by: STUDENT IN AN ORGANIZED HEALTH CARE EDUCATION/TRAINING PROGRAM

## 2022-10-30 PROCEDURE — 93010 EKG 12-LEAD: ICD-10-PCS | Mod: ,,, | Performed by: INTERNAL MEDICINE

## 2022-10-30 PROCEDURE — 85025 COMPLETE CBC W/AUTO DIFF WBC: CPT | Performed by: STUDENT IN AN ORGANIZED HEALTH CARE EDUCATION/TRAINING PROGRAM

## 2022-10-30 PROCEDURE — 63600175 PHARM REV CODE 636 W HCPCS: Performed by: STUDENT IN AN ORGANIZED HEALTH CARE EDUCATION/TRAINING PROGRAM

## 2022-10-30 PROCEDURE — 27000221 HC OXYGEN, UP TO 24 HOURS

## 2022-10-30 PROCEDURE — 94761 N-INVAS EAR/PLS OXIMETRY MLT: CPT

## 2022-10-30 PROCEDURE — 80048 BASIC METABOLIC PNL TOTAL CA: CPT | Performed by: STUDENT IN AN ORGANIZED HEALTH CARE EDUCATION/TRAINING PROGRAM

## 2022-10-30 PROCEDURE — 93005 ELECTROCARDIOGRAM TRACING: CPT

## 2022-10-30 PROCEDURE — 63600175 PHARM REV CODE 636 W HCPCS: Performed by: INTERNAL MEDICINE

## 2022-10-30 PROCEDURE — 94664 DEMO&/EVAL PT USE INHALER: CPT

## 2022-10-30 RX ORDER — METOPROLOL TARTRATE 1 MG/ML
10 INJECTION, SOLUTION INTRAVENOUS ONCE
Status: COMPLETED | OUTPATIENT
Start: 2022-10-31 | End: 2022-10-30

## 2022-10-30 RX ADMIN — INSULIN DETEMIR 2 UNITS: 100 INJECTION, SOLUTION SUBCUTANEOUS at 09:10

## 2022-10-30 RX ADMIN — ENOXAPARIN SODIUM 30 MG: 100 INJECTION SUBCUTANEOUS at 04:10

## 2022-10-30 RX ADMIN — SODIUM CHLORIDE SOLN NEBU 3% 4 ML: 3 NEBU SOLN at 12:10

## 2022-10-30 RX ADMIN — POTASSIUM PHOSPHATE, MONOBASIC POTASSIUM PHOSPHATE, DIBASIC 30 MMOL: 224; 236 INJECTION, SOLUTION, CONCENTRATE INTRAVENOUS at 02:10

## 2022-10-30 RX ADMIN — SODIUM CHLORIDE SOLN NEBU 3% 4 ML: 3 NEBU SOLN at 01:10

## 2022-10-30 RX ADMIN — AZITHROMYCIN MONOHYDRATE 500 MG: 250 TABLET ORAL at 08:10

## 2022-10-30 RX ADMIN — INSULIN ASPART 2 UNITS: 100 INJECTION, SOLUTION INTRAVENOUS; SUBCUTANEOUS at 12:10

## 2022-10-30 RX ADMIN — CEFTRIAXONE 2 G: 2 INJECTION, SOLUTION INTRAVENOUS at 12:10

## 2022-10-30 RX ADMIN — METOROPROLOL TARTRATE 5 MG: 5 INJECTION, SOLUTION INTRAVENOUS at 12:10

## 2022-10-30 RX ADMIN — SODIUM CHLORIDE, SODIUM LACTATE, POTASSIUM CHLORIDE, AND CALCIUM CHLORIDE 1000 ML: .6; .31; .03; .02 INJECTION, SOLUTION INTRAVENOUS at 11:10

## 2022-10-30 RX ADMIN — METOROPROLOL TARTRATE 10 MG: 5 INJECTION, SOLUTION INTRAVENOUS at 10:10

## 2022-10-30 RX ADMIN — ATORVASTATIN CALCIUM 20 MG: 20 TABLET, FILM COATED ORAL at 09:10

## 2022-10-30 RX ADMIN — IPRATROPIUM BROMIDE AND ALBUTEROL SULFATE 3 ML: 2.5; .5 SOLUTION RESPIRATORY (INHALATION) at 12:10

## 2022-10-30 RX ADMIN — MONTELUKAST 10 MG: 10 TABLET, FILM COATED ORAL at 09:10

## 2022-10-30 RX ADMIN — IPRATROPIUM BROMIDE AND ALBUTEROL SULFATE 3 ML: 2.5; .5 SOLUTION RESPIRATORY (INHALATION) at 04:10

## 2022-10-30 RX ADMIN — FLUTICASONE FUROATE AND VILANTEROL TRIFENATATE 1 PUFF: 200; 25 POWDER RESPIRATORY (INHALATION) at 07:10

## 2022-10-30 RX ADMIN — PROCHLORPERAZINE EDISYLATE 5 MG: 5 INJECTION INTRAMUSCULAR; INTRAVENOUS at 11:10

## 2022-10-30 RX ADMIN — IPRATROPIUM BROMIDE AND ALBUTEROL SULFATE 3 ML: 2.5; .5 SOLUTION RESPIRATORY (INHALATION) at 09:10

## 2022-10-30 RX ADMIN — SODIUM CHLORIDE SOLN NEBU 3% 4 ML: 3 NEBU SOLN at 07:10

## 2022-10-30 RX ADMIN — HYDROMORPHONE HYDROCHLORIDE 0.2 MG: 1 INJECTION, SOLUTION INTRAMUSCULAR; INTRAVENOUS; SUBCUTANEOUS at 04:10

## 2022-10-30 RX ADMIN — METOROPROLOL TARTRATE 5 MG: 5 INJECTION, SOLUTION INTRAVENOUS at 05:10

## 2022-10-30 RX ADMIN — LATANOPROST 1 DROP: 50 SOLUTION OPHTHALMIC at 09:10

## 2022-10-30 RX ADMIN — IPRATROPIUM BROMIDE AND ALBUTEROL SULFATE 3 ML: 2.5; .5 SOLUTION RESPIRATORY (INHALATION) at 01:10

## 2022-10-30 RX ADMIN — INSULIN DETEMIR 2 UNITS: 100 INJECTION, SOLUTION SUBCUTANEOUS at 08:10

## 2022-10-30 RX ADMIN — INSULIN ASPART 3 UNITS: 100 INJECTION, SOLUTION INTRAVENOUS; SUBCUTANEOUS at 05:10

## 2022-10-30 RX ADMIN — SODIUM CHLORIDE SOLN NEBU 3% 4 ML: 3 NEBU SOLN at 09:10

## 2022-10-30 RX ADMIN — HYDROMORPHONE HYDROCHLORIDE 0.2 MG: 1 INJECTION, SOLUTION INTRAMUSCULAR; INTRAVENOUS; SUBCUTANEOUS at 09:10

## 2022-10-30 RX ADMIN — IPRATROPIUM BROMIDE AND ALBUTEROL SULFATE 3 ML: 2.5; .5 SOLUTION RESPIRATORY (INHALATION) at 08:10

## 2022-10-30 RX ADMIN — HYDROMORPHONE HYDROCHLORIDE 0.2 MG: 1 INJECTION, SOLUTION INTRAMUSCULAR; INTRAVENOUS; SUBCUTANEOUS at 12:10

## 2022-10-30 RX ADMIN — ONDANSETRON 4 MG: 2 INJECTION INTRAMUSCULAR; INTRAVENOUS at 10:10

## 2022-10-30 RX ADMIN — HYDROMORPHONE HYDROCHLORIDE 0.2 MG: 1 INJECTION, SOLUTION INTRAMUSCULAR; INTRAVENOUS; SUBCUTANEOUS at 08:10

## 2022-10-30 RX ADMIN — TIOTROPIUM BROMIDE INHALATION SPRAY 2 PUFF: 3.12 SPRAY, METERED RESPIRATORY (INHALATION) at 07:10

## 2022-10-30 RX ADMIN — TAMSULOSIN HYDROCHLORIDE 0.8 MG: 0.4 CAPSULE ORAL at 08:10

## 2022-10-30 RX ADMIN — METOROPROLOL TARTRATE 5 MG: 5 INJECTION, SOLUTION INTRAVENOUS at 06:10

## 2022-10-30 RX ADMIN — INSULIN ASPART 1 UNITS: 100 INJECTION, SOLUTION INTRAVENOUS; SUBCUTANEOUS at 11:10

## 2022-10-30 NOTE — ASSESSMENT & PLAN NOTE
87 y.o. F dx w LBO due to colon mass s/p R colectomy post op day 3. Doing well post surgical standpoint, however postoperative course complicated by dyspnea with right middle lobe atelectasis.     1) awaiting BM  2) Bladder scans for urinary retention  3) Appreciate pulmonary input for respiratory disease  4) Continue pain control  5) Advance to FLD   6) continue metoprolol if BP stable

## 2022-10-30 NOTE — SUBJECTIVE & OBJECTIVE
Subjective:     Interval History: Tachy to 110 overnight.  No Bms yet but is passing gas.  Having some minimal abdominal pain with respiratory treatments.  Ambulating well.  Did not get Boost yesterday as the hospital is out.      Post-Op Info:  Procedure(s) (LRB):  COLECTOMY, RIGHT (Right)   5 Days Post-Op      Medications:  Continuous Infusions:  Scheduled Meds:   albuterol-ipratropium  3 mL Nebulization Q4H    atorvastatin  20 mg Oral QHS    azithromycin  500 mg Oral Daily    cefTRIAXone (ROCEPHIN) IVPB  2 g Intravenous Q24H    enoxaparin  30 mg Subcutaneous Daily    fluticasone furoate-vilanteroL  1 puff Inhalation Daily    insulin detemir U-100  2 Units Subcutaneous BID    lactated ringers  500 mL Intravenous Once    latanoprost  1 drop Both Eyes QHS    metoprolol  5 mg Intravenous Q6H    montelukast  10 mg Oral QHS    sodium chloride 3%  4 mL Nebulization Q6H    tamsulosin  0.8 mg Oral Daily    tiotropium bromide  2 puff Inhalation Daily     PRN Meds:   dextrose 10%    dextrose 10%    glucagon (human recombinant)    HYDROmorphone    influenza 65up-adj    insulin aspart U-100    naloxone    ondansetron    prochlorperazine    sodium chloride 0.9%        Objective:     Vital Signs (Most Recent):  Temp: 98.2 °F (36.8 °C) (10/30/22 0830)  Pulse: 104 (10/30/22 0830)  Resp: 20 (10/30/22 0830)  BP: (!) 140/70 (10/30/22 0830)  SpO2: 95 % (10/30/22 0830)   Vital Signs (24h Range):  Temp:  [97.8 °F (36.6 °C)-98.3 °F (36.8 °C)] 98.2 °F (36.8 °C)  Pulse:  [] 104  Resp:  [15-97] 20  SpO2:  [92 %-100 %] 95 %  BP: (129-150)/(62-70) 140/70     Intake/Output - Last 3 Shifts         10/28 0700  10/29 0659 10/29 0700  10/30 0659 10/30 0700  10/31 0659    P.O. 0 60     IV Piggyback       Total Intake(mL/kg) 0 (0) 60 (1.3)     Urine (mL/kg/hr) 450 (0.4) 550 (0.5)     Stool 0 0     Total Output 450 550     Net -450 -490            Urine Occurrence 1 x      Stool Occurrence 0 x 0 x             Physical Exam  Vitals and  nursing note reviewed.   Constitutional:       Appearance: She is not toxic-appearing.   HENT:      Head: Normocephalic and atraumatic.      Nose: Nose normal.      Mouth/Throat:      Mouth: Mucous membranes are moist.   Eyes:      Extraocular Movements: Extraocular movements intact.      Pupils: Pupils are equal, round, and reactive to light.   Cardiovascular:      Rate and Rhythm: Normal rate.   Pulmonary:      Effort: No respiratory distress.      Breath sounds: Rhonchi present.   Abdominal:      Comments: No TTP    Musculoskeletal:      Cervical back: Normal range of motion.   Skin:     General: Skin is warm and dry.      Capillary Refill: Capillary refill takes less than 2 seconds.   Neurological:      General: No focal deficit present.      Mental Status: She is alert.   Psychiatric:         Mood and Affect: Mood normal.         Behavior: Behavior normal.       Significant Labs:  All pertinent lab results within the last 24 hours have been reviewed.     Significant Diagnostics:  I have reviewed all pertinent imaging results/findings within the past 24 hours.

## 2022-10-30 NOTE — PROGRESS NOTES
[facility]  Colorectal Surgery  Progress Note    Patient Name: Sussy Costa  MRN: [id number]  Admission Date: 10/25/2022  Hospital Length of Stay: 5 days  Attending Physician: Jass Holman MD    Subjective:     Interval History: Tachy to 110 overnight.  No Bms yet but is passing gas.  Having some minimal abdominal pain with respiratory treatments.  Ambulating well.  Did not get Boost yesterday as the hospital is out.      Post-Op Info:  Procedure(s) (LRB):  COLECTOMY, RIGHT (Right)   5 Days Post-Op      Medications:  Continuous Infusions:  Scheduled Meds:   albuterol-ipratropium  3 mL Nebulization Q4H    atorvastatin  20 mg Oral QHS    azithromycin  500 mg Oral Daily    cefTRIAXone (ROCEPHIN) IVPB  2 g Intravenous Q24H    enoxaparin  30 mg Subcutaneous Daily    fluticasone furoate-vilanteroL  1 puff Inhalation Daily    insulin detemir U-100  2 Units Subcutaneous BID    lactated ringers  500 mL Intravenous Once    latanoprost  1 drop Both Eyes QHS    metoprolol  5 mg Intravenous Q6H    montelukast  10 mg Oral QHS    sodium chloride 3%  4 mL Nebulization Q6H    tamsulosin  0.8 mg Oral Daily    tiotropium bromide  2 puff Inhalation Daily     PRN Meds:   dextrose 10%    dextrose 10%    glucagon (human recombinant)    HYDROmorphone    influenza 65up-adj    insulin aspart U-100    naloxone    ondansetron    prochlorperazine    sodium chloride 0.9%        Objective:     Vital Signs (Most Recent):  Temp: 98.2 °F (36.8 °C) (10/30/22 0830)  Pulse: 104 (10/30/22 0830)  Resp: 20 (10/30/22 0830)  BP: (!) 140/70 (10/30/22 0830)  SpO2: 95 % (10/30/22 0830)   Vital Signs (24h Range):  Temp:  [97.8 °F (36.6 °C)-98.3 °F (36.8 °C)] 98.2 °F (36.8 °C)  Pulse:  [] 104  Resp:  [15-97] 20  SpO2:  [92 %-100 %] 95 %  BP: (129-150)/(62-70) 140/70     Intake/Output - Last 3 Shifts         10/28 0700  10/29 0659 10/29 0700  10/30 0659 10/30 0700  10/31 0659    P.O. 0 60     IV Piggyback       Total  Intake(mL/kg) 0 (0) 60 (1.3)     Urine (mL/kg/hr) 450 (0.4) 550 (0.5)     Stool 0 0     Total Output 450 550     Net -450 -490            Urine Occurrence 1 x      Stool Occurrence 0 x 0 x             Physical Exam  Vitals and nursing note reviewed.   Constitutional:       Appearance: She is not toxic-appearing.   HENT:      Head: Normocephalic and atraumatic.      Nose: Nose normal.      Mouth/Throat:      Mouth: Mucous membranes are moist.   Eyes:      Extraocular Movements: Extraocular movements intact.      Pupils: Pupils are equal, round, and reactive to light.   Cardiovascular:      Rate and Rhythm: Normal rate.   Pulmonary:      Effort: No respiratory distress.      Breath sounds: Rhonchi present.   Abdominal:      Comments: No TTP    Musculoskeletal:      Cervical back: Normal range of motion.   Skin:     General: Skin is warm and dry.      Capillary Refill: Capillary refill takes less than 2 seconds.   Neurological:      General: No focal deficit present.      Mental Status: She is alert.   Psychiatric:         Mood and Affect: Mood normal.         Behavior: Behavior normal.       Significant Labs:  All pertinent lab results within the last 24 hours have been reviewed.     Significant Diagnostics:  I have reviewed all pertinent imaging results/findings within the past 24 hours.    Assessment/Plan:     * Colonic mass  87 y.o. F dx w LBO due to colon mass s/p R colectomy post op day 3. Doing well post surgical standpoint, however postoperative course complicated by dyspnea with right middle lobe atelectasis.     1) awaiting BM  2) Bladder scans for urinary retention  3) Appreciate pulmonary input for respiratory disease  4) Continue pain control  5) Advance to FLD   6) continue metoprolol if BP stable          Megan Xie MD  Colorectal Surgery  [facility]

## 2022-10-31 LAB
ANION GAP SERPL CALC-SCNC: 8 MMOL/L (ref 8–16)
BASOPHILS # BLD AUTO: 0.02 K/UL (ref 0–0.2)
BASOPHILS NFR BLD: 0.2 % (ref 0–1.9)
BUN SERPL-MCNC: 11 MG/DL (ref 8–23)
CALCIUM SERPL-MCNC: 7.8 MG/DL (ref 8.7–10.5)
CHLORIDE SERPL-SCNC: 101 MMOL/L (ref 95–110)
CO2 SERPL-SCNC: 26 MMOL/L (ref 23–29)
CREAT SERPL-MCNC: 0.6 MG/DL (ref 0.5–1.4)
CRP SERPL-MCNC: 104.7 MG/L (ref 0–8.2)
DIFFERENTIAL METHOD: ABNORMAL
EOSINOPHIL # BLD AUTO: 0.5 K/UL (ref 0–0.5)
EOSINOPHIL NFR BLD: 5 % (ref 0–8)
ERYTHROCYTE [DISTWIDTH] IN BLOOD BY AUTOMATED COUNT: 13.5 % (ref 11.5–14.5)
EST. GFR  (NO RACE VARIABLE): >60 ML/MIN/1.73 M^2
GLUCOSE SERPL-MCNC: 256 MG/DL (ref 70–110)
HCT VFR BLD AUTO: 32.6 % (ref 37–48.5)
HGB BLD-MCNC: 10.5 G/DL (ref 12–16)
IMM GRANULOCYTES # BLD AUTO: 0.08 K/UL (ref 0–0.04)
IMM GRANULOCYTES NFR BLD AUTO: 0.7 % (ref 0–0.5)
LYMPHOCYTES # BLD AUTO: 0.5 K/UL (ref 1–4.8)
LYMPHOCYTES NFR BLD: 4.2 % (ref 18–48)
MAGNESIUM SERPL-MCNC: 1.4 MG/DL (ref 1.6–2.6)
MCH RBC QN AUTO: 29.3 PG (ref 27–31)
MCHC RBC AUTO-ENTMCNC: 32.2 G/DL (ref 32–36)
MCV RBC AUTO: 91 FL (ref 82–98)
MONOCYTES # BLD AUTO: 1.1 K/UL (ref 0.3–1)
MONOCYTES NFR BLD: 9.9 % (ref 4–15)
NEUTROPHILS # BLD AUTO: 8.7 K/UL (ref 1.8–7.7)
NEUTROPHILS NFR BLD: 80 % (ref 38–73)
NRBC BLD-RTO: 0 /100 WBC
PHOSPHATE SERPL-MCNC: 1.3 MG/DL (ref 2.7–4.5)
PLATELET # BLD AUTO: 219 K/UL (ref 150–450)
PMV BLD AUTO: 11.2 FL (ref 9.2–12.9)
POCT GLUCOSE: 196 MG/DL (ref 70–110)
POCT GLUCOSE: 214 MG/DL (ref 70–110)
POCT GLUCOSE: 223 MG/DL (ref 70–110)
POCT GLUCOSE: 274 MG/DL (ref 70–110)
POCT GLUCOSE: 279 MG/DL (ref 70–110)
POTASSIUM SERPL-SCNC: 3.7 MMOL/L (ref 3.5–5.1)
RBC # BLD AUTO: 3.58 M/UL (ref 4–5.4)
SODIUM SERPL-SCNC: 135 MMOL/L (ref 136–145)
WBC # BLD AUTO: 10.81 K/UL (ref 3.9–12.7)

## 2022-10-31 PROCEDURE — 63600175 PHARM REV CODE 636 W HCPCS: Performed by: INTERNAL MEDICINE

## 2022-10-31 PROCEDURE — 86140 C-REACTIVE PROTEIN: CPT | Performed by: STUDENT IN AN ORGANIZED HEALTH CARE EDUCATION/TRAINING PROGRAM

## 2022-10-31 PROCEDURE — 63700000 PHARM REV CODE 250 ALT 637 W/O HCPCS: Performed by: INTERNAL MEDICINE

## 2022-10-31 PROCEDURE — 94640 AIRWAY INHALATION TREATMENT: CPT

## 2022-10-31 PROCEDURE — 63600175 PHARM REV CODE 636 W HCPCS: Performed by: NURSE PRACTITIONER

## 2022-10-31 PROCEDURE — 93005 ELECTROCARDIOGRAM TRACING: CPT

## 2022-10-31 PROCEDURE — 99900035 HC TECH TIME PER 15 MIN (STAT)

## 2022-10-31 PROCEDURE — 97535 SELF CARE MNGMENT TRAINING: CPT

## 2022-10-31 PROCEDURE — 63600175 PHARM REV CODE 636 W HCPCS: Performed by: STUDENT IN AN ORGANIZED HEALTH CARE EDUCATION/TRAINING PROGRAM

## 2022-10-31 PROCEDURE — 94668 MNPJ CHEST WALL SBSQ: CPT

## 2022-10-31 PROCEDURE — 83735 ASSAY OF MAGNESIUM: CPT | Performed by: STUDENT IN AN ORGANIZED HEALTH CARE EDUCATION/TRAINING PROGRAM

## 2022-10-31 PROCEDURE — 85025 COMPLETE CBC W/AUTO DIFF WBC: CPT | Performed by: STUDENT IN AN ORGANIZED HEALTH CARE EDUCATION/TRAINING PROGRAM

## 2022-10-31 PROCEDURE — 25000242 PHARM REV CODE 250 ALT 637 W/ HCPCS: Performed by: INTERNAL MEDICINE

## 2022-10-31 PROCEDURE — 97530 THERAPEUTIC ACTIVITIES: CPT

## 2022-10-31 PROCEDURE — 94761 N-INVAS EAR/PLS OXIMETRY MLT: CPT

## 2022-10-31 PROCEDURE — 25000003 PHARM REV CODE 250: Performed by: STUDENT IN AN ORGANIZED HEALTH CARE EDUCATION/TRAINING PROGRAM

## 2022-10-31 PROCEDURE — 93010 ELECTROCARDIOGRAM REPORT: CPT | Mod: ,,, | Performed by: INTERNAL MEDICINE

## 2022-10-31 PROCEDURE — 20600001 HC STEP DOWN PRIVATE ROOM

## 2022-10-31 PROCEDURE — 25000003 PHARM REV CODE 250

## 2022-10-31 PROCEDURE — 93010 EKG 12-LEAD: ICD-10-PCS | Mod: ,,, | Performed by: INTERNAL MEDICINE

## 2022-10-31 PROCEDURE — 84100 ASSAY OF PHOSPHORUS: CPT | Performed by: STUDENT IN AN ORGANIZED HEALTH CARE EDUCATION/TRAINING PROGRAM

## 2022-10-31 PROCEDURE — 36415 COLL VENOUS BLD VENIPUNCTURE: CPT | Performed by: STUDENT IN AN ORGANIZED HEALTH CARE EDUCATION/TRAINING PROGRAM

## 2022-10-31 PROCEDURE — 97116 GAIT TRAINING THERAPY: CPT

## 2022-10-31 PROCEDURE — 25000003 PHARM REV CODE 250: Performed by: NURSE PRACTITIONER

## 2022-10-31 PROCEDURE — 27000221 HC OXYGEN, UP TO 24 HOURS

## 2022-10-31 PROCEDURE — 80048 BASIC METABOLIC PNL TOTAL CA: CPT | Performed by: STUDENT IN AN ORGANIZED HEALTH CARE EDUCATION/TRAINING PROGRAM

## 2022-10-31 RX ORDER — BENZONATATE 100 MG/1
200 CAPSULE ORAL 3 TIMES DAILY
Status: DISCONTINUED | OUTPATIENT
Start: 2022-10-31 | End: 2022-10-31

## 2022-10-31 RX ORDER — METOPROLOL TARTRATE 1 MG/ML
5 INJECTION, SOLUTION INTRAVENOUS ONCE
Status: DISCONTINUED | OUTPATIENT
Start: 2022-10-31 | End: 2022-11-02 | Stop reason: HOSPADM

## 2022-10-31 RX ORDER — FUROSEMIDE 10 MG/ML
20 INJECTION INTRAMUSCULAR; INTRAVENOUS ONCE
Status: COMPLETED | OUTPATIENT
Start: 2022-10-31 | End: 2022-10-31

## 2022-10-31 RX ORDER — GUAIFENESIN 100 MG/5ML
200 SOLUTION ORAL EVERY 4 HOURS
Status: DISCONTINUED | OUTPATIENT
Start: 2022-10-31 | End: 2022-11-02 | Stop reason: HOSPADM

## 2022-10-31 RX ADMIN — GUAIFENESIN 200 MG: 200 SOLUTION ORAL at 01:10

## 2022-10-31 RX ADMIN — ENOXAPARIN SODIUM 30 MG: 100 INJECTION SUBCUTANEOUS at 06:10

## 2022-10-31 RX ADMIN — TIOTROPIUM BROMIDE INHALATION SPRAY 2 PUFF: 3.12 SPRAY, METERED RESPIRATORY (INHALATION) at 08:10

## 2022-10-31 RX ADMIN — INSULIN DETEMIR 2 UNITS: 100 INJECTION, SOLUTION SUBCUTANEOUS at 09:10

## 2022-10-31 RX ADMIN — SODIUM CHLORIDE SOLN NEBU 3% 4 ML: 3 NEBU SOLN at 07:10

## 2022-10-31 RX ADMIN — ATORVASTATIN CALCIUM 20 MG: 20 TABLET, FILM COATED ORAL at 09:10

## 2022-10-31 RX ADMIN — TAMSULOSIN HYDROCHLORIDE 0.8 MG: 0.4 CAPSULE ORAL at 08:10

## 2022-10-31 RX ADMIN — GUAIFENESIN 200 MG: 200 SOLUTION ORAL at 06:10

## 2022-10-31 RX ADMIN — INSULIN ASPART 2 UNITS: 100 INJECTION, SOLUTION INTRAVENOUS; SUBCUTANEOUS at 06:10

## 2022-10-31 RX ADMIN — SODIUM CHLORIDE SOLN NEBU 3% 4 ML: 3 NEBU SOLN at 08:10

## 2022-10-31 RX ADMIN — HYDROMORPHONE HYDROCHLORIDE 0.2 MG: 1 INJECTION, SOLUTION INTRAMUSCULAR; INTRAVENOUS; SUBCUTANEOUS at 06:10

## 2022-10-31 RX ADMIN — INSULIN DETEMIR 2 UNITS: 100 INJECTION, SOLUTION SUBCUTANEOUS at 08:10

## 2022-10-31 RX ADMIN — CEFTRIAXONE 2 G: 2 INJECTION, SOLUTION INTRAVENOUS at 12:10

## 2022-10-31 RX ADMIN — AZITHROMYCIN MONOHYDRATE 500 MG: 250 TABLET ORAL at 08:10

## 2022-10-31 RX ADMIN — MONTELUKAST 10 MG: 10 TABLET, FILM COATED ORAL at 09:10

## 2022-10-31 RX ADMIN — IPRATROPIUM BROMIDE AND ALBUTEROL SULFATE 3 ML: 2.5; .5 SOLUTION RESPIRATORY (INHALATION) at 12:10

## 2022-10-31 RX ADMIN — IPRATROPIUM BROMIDE AND ALBUTEROL SULFATE 3 ML: 2.5; .5 SOLUTION RESPIRATORY (INHALATION) at 05:10

## 2022-10-31 RX ADMIN — FUROSEMIDE 20 MG: 10 INJECTION, SOLUTION INTRAMUSCULAR; INTRAVENOUS at 12:10

## 2022-10-31 RX ADMIN — FLUTICASONE FUROATE AND VILANTEROL TRIFENATATE 1 PUFF: 200; 25 POWDER RESPIRATORY (INHALATION) at 08:10

## 2022-10-31 RX ADMIN — INSULIN ASPART 2 UNITS: 100 INJECTION, SOLUTION INTRAVENOUS; SUBCUTANEOUS at 08:10

## 2022-10-31 RX ADMIN — LATANOPROST 1 DROP: 50 SOLUTION OPHTHALMIC at 09:10

## 2022-10-31 RX ADMIN — IPRATROPIUM BROMIDE AND ALBUTEROL SULFATE 3 ML: 2.5; .5 SOLUTION RESPIRATORY (INHALATION) at 08:10

## 2022-10-31 RX ADMIN — METOROPROLOL TARTRATE 5 MG: 5 INJECTION, SOLUTION INTRAVENOUS at 06:10

## 2022-10-31 RX ADMIN — METOROPROLOL TARTRATE 5 MG: 5 INJECTION, SOLUTION INTRAVENOUS at 05:10

## 2022-10-31 RX ADMIN — IPRATROPIUM BROMIDE AND ALBUTEROL SULFATE 3 ML: 2.5; .5 SOLUTION RESPIRATORY (INHALATION) at 04:10

## 2022-10-31 RX ADMIN — GUAIFENESIN 200 MG: 200 SOLUTION ORAL at 10:10

## 2022-10-31 NOTE — CODE/ RAPID DOCUMENTATION
RAPID RESPONSE NURSE NOTE        Admit Date: 10/25/2022  LOS: 6  Code Status: Full Code   Date of Consult: 10/31/2022  : 1935  Age: 87 y.o.  Weight:   Wt Readings from Last 1 Encounters:   10/25/22 47.2 kg (104 lb)     Sex: female  Race: White   Bed: [address] [place] [address] A:   MRN: [id number]  Time Rapid Response Team page Received: 1045  Time Rapid Response Team at Bedside: 1050  Time Rapid Response Team left Bedside: 1119  Was the patient discharged from an ICU this admission? No   Was the patient discharged from a PACU within last 24 hours? No   Did the patient receive conscious sedation/general anesthesia in last 24 hours? No  Was the patient in the ED within the past 24 hours? No  Was the patient on NIPPV within the past 24 hours? No   Did this progress into an ARC or CPA: no  Attending Physician: Jass Holman MD  Primary Service: Colon and Rectal Surgery       SITUATION    Notified by charge RN via phone call.  Reason for alert: Increased respiratory requirements  Called to evaluate the patient for Respiratory    BACKGROUND     Why is the patient in the hospital?: Colonic mass    Patient has a past medical history of Asthma, Cyst of pancreas, Diabetes mellitus type II, Eczema of hand, Glaucoma, Hyperlipidemia, Hypertension, Lichen planus, Osteoporosis, and Pulmonary nodules.    Last Vitals:  Temp: 97.8 °F (36.6 °C) (10/31 1126)  Pulse: 106 (10/31 1126)  Resp: 18 (10/31 1126)  BP: 132/53 (10/31 1126)  SpO2: 91 % (10/31 1126)    24 Hours Vitals Range:  Temp:  [97 °F (36.1 °C)-99.9 °F (37.7 °C)]   Pulse:  []   Resp:  [14-20]   BP: ()/(51-84)   SpO2:  [80 %-99 %]     Labs:  Recent Labs     10/30/22  0923 10/30/22  2314 10/31/22  0359   WBC 9.74 12.19 10.81   HGB 10.8* 11.6* 10.5*   HCT 35.5* 36.0* 32.6*    242 219       Recent Labs     10/30/22  0923 10/30/22  2314 10/31/22  0359    138 135*   K 3.7 3.7 3.7    100 101   CO2 21* 31* 26   CREATININE 0.6 0.6 0.6   * 268* 256*  "  PHOS 1.5* 1.9* 1.3*   MG 1.9 1.6 1.4*        No results for input(s): PH, PCO2, PO2, HCO3, POCSATURATED, BE in the last 72 hours.     ASSESSMENT    Physical Exam  Vitals and nursing note reviewed.   Cardiovascular:      Rate and Rhythm: Tachycardia present. Rhythm irregular.   Pulmonary:      Effort: Tachypnea and accessory muscle usage present.      Breath sounds: Wheezing present.      Comments: Coarse BS  Skin:     General: Skin is cool.      Capillary Refill: Capillary refill takes more than 3 seconds.      Coloration: Skin is cyanotic.      Comments: Fingertips cyanotic, cold, with poor cap refill   Neurological:      Mental Status: She is alert and oriented to person, place, and time.      GCS: GCS eye subscore is 4. GCS verbal subscore is 5. GCS motor subscore is 6.     Called to bedside by Charge RNLinda for increased WOB s/p physical therapy session. On arrival to bedside patient sitting up in chair, AAOx4, with complaints of SOB. Sats 88-91% on 15L NRB. Patient noted to be tachypneic with RR ~30 and using neck muscles to breathe. CXR and ABG ordered. Attempted ABG, but not successful. Patient with frequent non productive cough. She states it "feels like its in the back of her throat, but not coming up". Reports use of IS and CPT treatments provided by respiratory.     INTERVENTIONS    The patient was seen for Respiratory problem. Staff concerns included new onset of difficulty breathing, increased WOB, and increased oxygen requirements. The following interventions were performed: supplemental oxygen, POCT arterial blood gas , and portable chest x-ray.    RECOMMENDATIONS    We recommend:     ABG  CXR  Aggressive pulmonary toilet  Wean oxygen as tolerated  Consider adding expectorant    PROVIDER ESCALATION    Orders received and case discussed with NA.    Primary team arrival time: Never came to bedside while RRN at bedside    Disposition: Remain in room 1059.    FOLLOW UP    Bedside RNShell  " updated on plan of care. Instructed to call the Rapid Response Nurse, Jenny Tse RN at [phone] for additional questions or concerns.

## 2022-10-31 NOTE — PLAN OF CARE
Plan of care reviewed with pt. Verbalized understanding. Pt AAOx4. VS stable on 1L NC. Pt tachy into 140s overnight. EKG done. Lopressor x2 given. HR currently 80s-100s. (See previous note) Pain managed with PRN medicine.     - Abd ML w/ DB CDI. Abd distention noted.  - Pt having retention issues overnight. Guevara placed. (See previous notes).   - x1 BM overnight.  - Accuchecks done q6hr. Coverage given per MAR.     Pt on clear liquid diet. Nausea managed w/ PRN meds. Frequent rounds made for pt safety. Bed low and locked, call light in reach. Will continue to manage POC.         Problem: Infection  Goal: Absence of Infection Signs and Symptoms  Outcome: Ongoing, Progressing     Problem: Adult Inpatient Plan of Care  Goal: Plan of Care Review  Outcome: Ongoing, Progressing  Goal: Patient-Specific Goal (Individualized)  Outcome: Ongoing, Progressing  Goal: Absence of Hospital-Acquired Illness or Injury  Outcome: Ongoing, Progressing  Goal: Optimal Comfort and Wellbeing  Outcome: Ongoing, Progressing  Goal: Readiness for Transition of Care  Outcome: Ongoing, Progressing     Problem: Adjustment to Illness (Sepsis/Septic Shock)  Goal: Optimal Coping  Outcome: Ongoing, Progressing     Problem: Bleeding (Sepsis/Septic Shock)  Goal: Absence of Bleeding  Outcome: Ongoing, Progressing     Problem: Glycemic Control Impaired (Sepsis/Septic Shock)  Goal: Blood Glucose Level Within Desired Range  Outcome: Ongoing, Progressing     Problem: Infection Progression (Sepsis/Septic Shock)  Goal: Absence of Infection Signs and Symptoms  Outcome: Ongoing, Progressing     Problem: Nutrition Impaired (Sepsis/Septic Shock)  Goal: Optimal Nutrition Intake  Outcome: Ongoing, Progressing     Problem: Fluid Imbalance (Pneumonia)  Goal: Fluid Balance  Outcome: Ongoing, Progressing     Problem: Infection (Pneumonia)  Goal: Resolution of Infection Signs and Symptoms  Outcome: Ongoing, Progressing     Problem: Respiratory Compromise  (Pneumonia)  Goal: Effective Oxygenation and Ventilation  Outcome: Ongoing, Progressing     Problem: Diabetes Comorbidity  Goal: Blood Glucose Level Within Targeted Range  Outcome: Ongoing, Progressing     Problem: Fall Injury Risk  Goal: Absence of Fall and Fall-Related Injury  Outcome: Ongoing, Progressing     Problem: Skin Injury Risk Increased  Goal: Skin Health and Integrity  Outcome: Ongoing, Progressing     Problem: Fluid Volume Deficit  Goal: Fluid Balance  Outcome: Ongoing, Progressing

## 2022-10-31 NOTE — ASSESSMENT & PLAN NOTE
87 y.o. F dx w LBO due to colon mass s/p R colectomy post op day 3. Doing well post surgical standpoint, however postoperative course complicated by dyspnea with right middle lobe atelectasis.     1) had BM  2) Bladder scans for urinary retention  3) Appreciate pulmonary input for respiratory disease  4) Continue pain control  5) Advance to reg diet   6) continue metoprolol if BP stable

## 2022-10-31 NOTE — SUBJECTIVE & OBJECTIVE
Subjective:     Interval History: no acute events overnite, had bm, no n/v, adequate pain control    Post-Op Info:  Procedure(s) (LRB):  COLECTOMY, RIGHT (Right)   6 Days Post-Op      Medications:  Continuous Infusions:  Scheduled Meds:   albuterol-ipratropium  3 mL Nebulization Q4H    atorvastatin  20 mg Oral QHS    azithromycin  500 mg Oral Daily    cefTRIAXone (ROCEPHIN) IVPB  2 g Intravenous Q24H    enoxaparin  30 mg Subcutaneous Daily    fluticasone furoate-vilanteroL  1 puff Inhalation Daily    guaiFENesin 100 mg/5 ml  200 mg Oral Q4H    insulin detemir U-100  2 Units Subcutaneous BID    latanoprost  1 drop Both Eyes QHS    metoprolol  5 mg Intravenous Q6H    metoprolol  5 mg Intravenous Once    montelukast  10 mg Oral QHS    sodium chloride 3%  4 mL Nebulization Q6H    tamsulosin  0.8 mg Oral Daily    tiotropium bromide  2 puff Inhalation Daily     PRN Meds:   dextrose 10%    dextrose 10%    glucagon (human recombinant)    HYDROmorphone    influenza 65up-adj    insulin aspart U-100    naloxone    ondansetron    prochlorperazine    sodium chloride 0.9%        Objective:     Vital Signs (Most Recent):  Temp: 97.8 °F (36.6 °C) (10/31/22 1126)  Pulse: 96 (10/31/22 1222)  Resp: 18 (10/31/22 1222)  BP: (!) 132/53 (10/31/22 1126)  SpO2: (!) 93 % (10/31/22 1222)   Vital Signs (24h Range):  Temp:  [97 °F (36.1 °C)-99.9 °F (37.7 °C)] 97.8 °F (36.6 °C)  Pulse:  [] 96  Resp:  [15-19] 18  SpO2:  [80 %-98 %] 93 %  BP: ()/(51-84) 132/53     Intake/Output - Last 3 Shifts         10/29 0700  10/30 0659 10/30 0700  10/31 0659 10/31 0700 11/01 0659    P.O. 60 150     IV Piggyback  1000     Total Intake(mL/kg) 60 (1.3) 1150 (24.4)     Urine (mL/kg/hr) 550 (0.5) 1250 (1.1) 300 (0.9)    Stool 0 0     Total Output 550 1250 300    Net -490 -100 -300           Stool Occurrence 0 x 1 x             Physical Exam  Vitals and nursing note reviewed.   Constitutional:       Appearance: She is well-developed.   HENT:       Head: Normocephalic.   Eyes:      Pupils: Pupils are equal, round, and reactive to light.   Cardiovascular:      Rate and Rhythm: Normal rate and regular rhythm.      Heart sounds: Normal heart sounds.   Pulmonary:      Effort: Pulmonary effort is normal. No respiratory distress.      Breath sounds: Rhonchi and rales present. No wheezing.      Comments: Breath sounds congested, not able to cough up sputum   Abdominal:      Palpations: Abdomen is soft. There is no mass.      Tenderness: There is no guarding or rebound.   Skin:     General: Skin is warm and dry.   Neurological:      Mental Status: She is alert and oriented to person, place, and time.   Psychiatric:         Behavior: Behavior normal.         Thought Content: Thought content normal.         Judgment: Judgment normal.       Significant Labs:  BMP (Last 3 Results):   Recent Labs   Lab 10/30/22  0923 10/30/22  2314 10/31/22  0359   * 268* 256*    138 135*   K 3.7 3.7 3.7    100 101   CO2 21* 31* 26   BUN 13 11 11   CREATININE 0.6 0.6 0.6   CALCIUM 7.7* 8.0* 7.8*   MG 1.9 1.6 1.4*     CBC (Last 3 Results):   Recent Labs   Lab 10/30/22  0923 10/30/22  2314 10/31/22  0359   WBC 9.74 12.19 10.81   RBC 3.75* 3.97* 3.58*   HGB 10.8* 11.6* 10.5*   HCT 35.5* 36.0* 32.6*    242 219   MCV 95 91 91   MCH 28.8 29.2 29.3   MCHC 30.4* 32.2 32.2       Significant Diagnostics:  None

## 2022-10-31 NOTE — PROGRESS NOTES
[facility]  Colorectal Surgery  Progress Note    Patient Name: Sussy Costa  MRN: [id number]  Admission Date: 10/25/2022  Hospital Length of Stay: 6 days  Attending Physician: Jass Holman MD    Subjective:     Interval History: no acute events overnite, had bm, no n/v, adequate pain control    Post-Op Info:  Procedure(s) (LRB):  COLECTOMY, RIGHT (Right)   6 Days Post-Op      Medications:  Continuous Infusions:  Scheduled Meds:   albuterol-ipratropium  3 mL Nebulization Q4H    atorvastatin  20 mg Oral QHS    azithromycin  500 mg Oral Daily    cefTRIAXone (ROCEPHIN) IVPB  2 g Intravenous Q24H    enoxaparin  30 mg Subcutaneous Daily    fluticasone furoate-vilanteroL  1 puff Inhalation Daily    guaiFENesin 100 mg/5 ml  200 mg Oral Q4H    insulin detemir U-100  2 Units Subcutaneous BID    latanoprost  1 drop Both Eyes QHS    metoprolol  5 mg Intravenous Q6H    metoprolol  5 mg Intravenous Once    montelukast  10 mg Oral QHS    sodium chloride 3%  4 mL Nebulization Q6H    tamsulosin  0.8 mg Oral Daily    tiotropium bromide  2 puff Inhalation Daily     PRN Meds:   dextrose 10%    dextrose 10%    glucagon (human recombinant)    HYDROmorphone    influenza 65up-adj    insulin aspart U-100    naloxone    ondansetron    prochlorperazine    sodium chloride 0.9%        Objective:     Vital Signs (Most Recent):  Temp: 97.8 °F (36.6 °C) (10/31/22 1126)  Pulse: 96 (10/31/22 1222)  Resp: 18 (10/31/22 1222)  BP: (!) 132/53 (10/31/22 1126)  SpO2: (!) 93 % (10/31/22 1222)   Vital Signs (24h Range):  Temp:  [97 °F (36.1 °C)-99.9 °F (37.7 °C)] 97.8 °F (36.6 °C)  Pulse:  [] 96  Resp:  [15-19] 18  SpO2:  [80 %-98 %] 93 %  BP: ()/(51-84) 132/53     Intake/Output - Last 3 Shifts         10/29 0700  10/30 0659 10/30 0700  10/31 0659 10/31 0700  11/01 0659    P.O. 60 150     IV Piggyback  1000     Total Intake(mL/kg) 60 (1.3) 1150 (24.4)     Urine (mL/kg/hr) 550 (0.5) 1250 (1.1) 300 (0.9)     Stool 0 0     Total Output 550 1250 300    Net -490 -100 -300           Stool Occurrence 0 x 1 x             Physical Exam  Vitals and nursing note reviewed.   Constitutional:       Appearance: She is well-developed.   HENT:      Head: Normocephalic.   Eyes:      Pupils: Pupils are equal, round, and reactive to light.   Cardiovascular:      Rate and Rhythm: Normal rate and regular rhythm.      Heart sounds: Normal heart sounds.   Pulmonary:      Effort: Pulmonary effort is normal. No respiratory distress.      Breath sounds: Rhonchi and rales present. No wheezing.      Comments: Breath sounds congested, not able to cough up sputum   Abdominal:      Palpations: Abdomen is soft. There is no mass.      Tenderness: There is no guarding or rebound.   Skin:     General: Skin is warm and dry.   Neurological:      Mental Status: She is alert and oriented to person, place, and time.   Psychiatric:         Behavior: Behavior normal.         Thought Content: Thought content normal.         Judgment: Judgment normal.       Significant Labs:  BMP (Last 3 Results):   Recent Labs   Lab 10/30/22  0923 10/30/22  2314 10/31/22  0359   * 268* 256*    138 135*   K 3.7 3.7 3.7    100 101   CO2 21* 31* 26   BUN 13 11 11   CREATININE 0.6 0.6 0.6   CALCIUM 7.7* 8.0* 7.8*   MG 1.9 1.6 1.4*     CBC (Last 3 Results):   Recent Labs   Lab 10/30/22  0923 10/30/22  2314 10/31/22  0359   WBC 9.74 12.19 10.81   RBC 3.75* 3.97* 3.58*   HGB 10.8* 11.6* 10.5*   HCT 35.5* 36.0* 32.6*    242 219   MCV 95 91 91   MCH 28.8 29.2 29.3   MCHC 30.4* 32.2 32.2       Significant Diagnostics:  None    Assessment/Plan:     * Colonic mass  87 y.o. F dx w LBO due to colon mass s/p R colectomy post op day 3. Doing well post surgical standpoint, however postoperative course complicated by dyspnea with right middle lobe atelectasis.     1) had BM  2) Bladder scans for urinary retention  3) Appreciate pulmonary input for respiratory  disease  4) Continue pain control  5) Advance to reg diet   6) continue metoprolol if BP stable    Asthma, moderate persistent  Continue home meds  Monitor resp status  Continue aggressiver pulmonary toileting    Atelectasis  Chest xray with atelectasis  Continue resp tx every 4 hours  Add beatrice  Add pulmonary PT  Consult to pulmonary  Continue antibiotics for now  oxgyen desat after PT, quickely responded to increase oxygen            Kayla Baker NP  Colorectal Surgery  [facility]

## 2022-10-31 NOTE — NURSING
Notified Dr. Mann of patient's heart rate fluctuating between 120s and 150s, patient complaining of nausea and distention, unable to urinate.  Order placed for EKG. MD is coming to the bedside.

## 2022-10-31 NOTE — PROGRESS NOTES
Guevara placed per order. Labs drawn, awaiting results. HR currently 90. All other vitals at pt baseline. Pt states cramping has subsided but is still nauseated. PRN meds given. Will follow up to see if nausea subsided. CABRERA.

## 2022-10-31 NOTE — PLAN OF CARE
[facility]  Discharge Reassessment    Primary Care Provider: EZ Casillas MD    Expected Discharge Date: 11/2/2022    Reassessment (most recent)       Discharge Reassessment - 10/31/22 1600          Discharge Reassessment    Assessment Type Discharge Planning Reassessment     Did the patient's condition or plan change since previous assessment? Yes     Discharge Plan discussed with: Patient     Communicated ANTOINE with patient/caregiver Date not available/Unable to determine     Discharge Plan A Skilled Nursing Facility     Discharge Plan B Home with family;Home Health     DME Needed Upon Discharge  none     Discharge Barriers Identified None     Why the patient remains in the hospital Requires continued medical care        Post-Acute Status    Post-Acute Authorization Placement     Post-Acute Placement Status Referrals Sent     Coverage Blue Cross Blue Shield     Discharge Delays Diet Not Ready for Discharge                   TRAVON Oscar, RN    EXT [id number]

## 2022-10-31 NOTE — ASSESSMENT & PLAN NOTE
Chest xray with atelectasis  Continue resp tx every 4 hours  Add beatrice  Add pulmonary PT  Consult to pulmonary  Continue antibiotics for now  oxgyen desat after PT, quickely responded to increase oxygen

## 2022-10-31 NOTE — PROGRESS NOTES
10/31/22 1038   Vital Signs   Pulse 103   SpO2 (!) 90 %   BP (!) 99/55   MAP (mmHg) 69        Cardiac/Telemetry Details / Alarms   Cardiac/Telemetry Monitor On Yes   Cardiac/Telemetry Audible Yes   Cardiac/Telemetry Alarms Set Yes   Cardiac/Telemetry Box Number [id number]   Pt working with PT/OT and noted Spo2 79%,  in a-fib, labored breathing, and audible coarse lungs. Pt is placed in chair, c/o SOB. Nonrebreather applied. RRT, and GHISLAINE Baker notified.  1048: Orders placed for  EKG, metoprolol 5 mg IVP.  1050: RRT team at bedside, instructed per RRT not to administer metoprolol, pt's HR is controled, and BP maintained.   1052: orders placed for ABG, and chest x-ray, ABG unable to be obtained.  1115: EKG tech at bedside, pt weaned to 5LNC Spo2 91%. NP at bedside to assess pt. Awaiting chest x-ray. Pt sitting up in chair, NADN will continue plan of care.

## 2022-10-31 NOTE — PLAN OF CARE
Problem: Infection  Goal: Absence of Infection Signs and Symptoms  Outcome: Ongoing, Progressing     Problem: Adult Inpatient Plan of Care  Goal: Plan of Care Review  Outcome: Ongoing, Progressing     Problem: Diabetes Comorbidity  Goal: Blood Glucose Level Within Targeted Range  Outcome: Ongoing, Progressing     Problem: Fall Injury Risk  Goal: Absence of Fall and Fall-Related Injury  Outcome: Ongoing, Progressing     Problem: Skin Injury Risk Increased  Goal: Skin Health and Integrity  Outcome: Ongoing, Progressing     Problem: Fluid Volume Deficit  Goal: Fluid Balance  Outcome: Ongoing, Progressing

## 2022-10-31 NOTE — PT/OT/SLP PROGRESS
"Physical Therapy Co-Treatment    Patient Name:  Sussy Costa   MRN:  [id number]    Co-treatment performed for this visit due to patient need for two skilled therapists to ensure patient and staff safety and to accommodate for patient significantly decreased activity tolerance  Recommendations:     Discharge Recommendations:  nursing facility, skilled   Discharge Equipment Recommendations: shower chair, bedside commode   Barriers to discharge: None    Assessment:     Sussy Costa is a 87 y.o. female admitted with a medical diagnosis of Colonic mass. Patient tolerated session fairly, continues to be limited by shortness of breath and fatigue. SpO2 to low 80s while seated in bathroom with 1 L O2 intact, RN instructed to increase O2 to 3 L, later RN instructed to increase to 5 L due to SpO2 of 87.     She presents with the following impairments/functional limitations: weakness, impaired functional mobility, pain, gait instability, impaired endurance, impaired balance, decreased lower extremity function, impaired self care skills, impaired cardiopulmonary response to activity. Once medically stable, recommending pt discharge to nursing facility, skilled.    Rehab Prognosis: Good; patient continues to benefit from acute skilled PT services to address these deficits and reach maximum level of function.  Recent Surgery: Procedure(s) (LRB):  COLECTOMY, RIGHT (Right) 6 Days Post-Op    Plan:     During this hospitalization, patient to be seen 4 x/week to address the identified rehab impairments via gait training, therapeutic activities, therapeutic exercises, neuromuscular re-education and progress toward the following goals:    Plan of Care Expires:  11/25/22    Subjective     Chief Complaint: Abdominal pain  Patient/Family Comments/Goals: "I can't tell when I'm going" regarding fecal incontinence in standing  Pain/Comfort:  Pain Rating 1: 6/10  Location - Orientation 1: generalized  Location 1: abdomen  Pain Addressed 1: " Reposition  Pain Rating Post-Intervention 1:  (not rated)    Objective:     Communicated with RN prior to session. Patient found HOB elevated with telemetry, oxygen, ca catheter upon PT entry to room.     General Precautions: Standard, fall   Orthopedic Precautions:N/A   Braces: N/A    Functional Mobility:  Bed Mobility:     Rolling Left:  stand by assistance  Scooting: stand by assistance  Supine to Sit: stand by assistance  Transfers:     Sit to Stand: contact guard assistance with rolling walker  Toilet transfer: minimum assistance with rolling walker using Step Transfer  Gait: Patient ambulated 12 ftx2 with rolling walker and contact guard assistance. Patient demonstrates occasional unsteady gait, decreased step length, ambulates outside MOLLY of RW, and flexed posture. Patient required cues for upright posture, to ambulate within MOLLY of RW, and increased MOLLY. All lines remained intact throughout ambulation trial.  Balance:   Static Sitting: Good, able to maintain for 6 minute(s) with stand by assistance  Dynamic Sitting: Fair: Patient accepts minimal challenge, stand by assistance  Static Standing: Good, able to maintain for 10 seconds with contact guard assistance  Dynamic Standing: Fair: Patient accepts minimal challenge, contact guard assistance    AM-PAC 6 CLICK MOBILITY  Turning over in bed (including adjusting bedclothes, sheets and blankets)?: 3  Sitting down on and standing up from a chair with arms (e.g., wheelchair, bedside commode, etc.): 3  Moving from lying on back to sitting on the side of the bed?: 3  Moving to and from a bed to a chair (including a wheelchair)?: 3  Need to walk in hospital room?: 3  Climbing 3-5 steps with a railing?: 1  Basic Mobility Total Score: 16     Therapeutic Activities and Exercises:  Patient educated on role of acute care PT and PT POC, safety while in hospital including calling nurse for mobility, and call light usage  Patient is clear to stand pivot transfer with  RN/PCT, assist x1  Attempted cough secretion clearance technique with huffing however unsuccessful  Educated about importance of OOB mobility and remaining UIC most of the day  Educated about pursed lip breathing technique and cued for use with mobility    Patient left up in chair with all lines intact, call button in reach, and RN notified.    GOALS:   Multidisciplinary Problems       Physical Therapy Goals          Problem: Physical Therapy    Goal Priority Disciplines Outcome Goal Variances Interventions   Physical Therapy Goal     PT, PT/OT Ongoing, Progressing     Description: Goals to met by 11/9/2022    1. Supine to sit with Modified [place]  2. Sit to supine with Modified [place]  3. Rolling to Left and Right with Modified [place].  4. Sit to stand transfer with Supervision  5. Bed to chair transfer with Supervision using LRAD  6. Gait  x 80 feet with Stand-by Assistance using LRAD   7. Stand for 10 minutes with Supervision using No Assistive Device  8. Lower extremity exercise program x15 reps per Instruction, with assistance as needed in order to facilitate improved strength, improved postural control, and improvement in functional independence                         Time Tracking:     PT Received On: 10/31/22  PT Start Time: 0955     PT Stop Time: 1028  PT Total Time (min): 33 min     Billable Minutes: Gait Training 12 min and Therapeutic Activity 15 min      Treatment Type: Treatment  PT/PTA: PT     PTA Visit Number: 0     10/31/2022

## 2022-10-31 NOTE — PHYSICIAN QUERY
PT Name: Sussy Costa  MR #: [id number]     DOCUMENTATION CLARIFICATION     CDS: Arnie COOL,RN        Contact information:[email or web address]  This form is a permanent document in the medical record.     Query Date: October 31, 2022    By submitting this query, we are merely seeking further clarification of documentation to reflect the severity of illness of your patient. Please utilize your independent clinical judgment when addressing the question(s) below.    The medical record reflects the following:     Indicators   Supporting Clinical Findings Location in Medical Record   x Bowel obstruction, intestinal obstruction, LBO or SBO documented Large bowel obstruction (Primary)   10/25 ED Provider Notes   x Radiology findings Impression:  1. Circumferential wall thickening of the short segment of ascending colon and apple-core appearance with pericolic fat stranding and free fluid, resulting in significant mass effect and dilatation of the cecum up to measuring 10 cm.  Suspect obstructing ascending colon neoplasm.  Recommend clinical correlation and further evaluation with colonoscopy and colorectal surgical consultation.  2. Diffuse gastric wall thickening and enhancement suggesting gastritis.  3. Stable innumerable pancreatic hypodense lesion.  4. Pulmonary nodules similar in size and number in comparison prior.  Metastasis not excluded.  5. Multiple hypodense lesions in the liver and a few in the spleen as well, similar to prior.  Metastasis not excluded.  6. Additional findings as above.  This report was flagged in Epic as abnormal.                 10/25 CT Abdomen Pelvis With Contrast   x Treatment/Medication Encounter for nasogastric (NG) tube placement 10/25 ED Provider Notes   x Procedure/Surgery DATE OF OPERATION: 10/25/2022  PREOPERATIVE DIAGNOSIS: Large bowel obstruction   POSTOPERATIVE DIAGNOSIS: Large bowel obstruction   PROCEDURE PERFORMED: Right Colectomy    ATTENDING SURGEON:  Jass Holman MD   RESIDENT/FELLOW SURGEON: Jim Smith MD  The midline fascia was entered sharply and adhesions of the small bowel to the abdominal wall were taken down sharply.  There was a small amount of ascites present and the ascending colon was tense and dilated but without ischemia.          10/25 Colon and Rectal Op Note    Other       Provider, please further specify the  type and etiology of bowel obstruction diagnosis:  [   ] Partial or incomplete intestinal obstruction, due to adhesions   [   x] Partial or incomplete intestinal obstruction, due to other (please specify): ___colonic mass_________   [   ] Partial or incomplete intestinal obstruction, unknown or unspecified etiology   [   ] Complete intestinal obstruction, due to adhesions   [   ] Complete intestinal obstruction, due to other (please specify): ____________   [   ] Complete intestinal obstruction, unknown or unspecified etiology   [   ] Other intestinal condition (please specify): _____________________   [   ]  Clinically Undetermined       Please document in your progress notes daily for the duration of treatment until resolved, and include in your discharge summary.

## 2022-10-31 NOTE — RESPIRATORY THERAPY
RAPID RESPONSE RESPIRATORY THERAPY NOTE             Code Status: Full Code   : 1935  Bed: 1059/1059 A:   MRN: [id number]  Time page Received: 1059  Time Rapid Response RT at Bedside: 1103  Time Rapid Response RT left Bedside: 1120    SITUATION    Evaluated patient for: SOB and low SpO2    BACKGROUND    Why is the patient in the hospital?: Colonic mass    Patient has a past medical history of Asthma, Cyst of pancreas, Diabetes mellitus type II, Eczema of hand, Glaucoma, Hyperlipidemia, Hypertension, Lichen planus, Osteoporosis, and Pulmonary nodules.    24 Hours Vitals Range:  Temp:  [97 °F (36.1 °C)-99.9 °F (37.7 °C)]   Pulse:  []   Resp:  [14-20]   BP: ()/(51-84)   SpO2:  [80 %-99 %]     Labs:    Recent Labs     10/30/22  0923 10/30/22  2314 10/31/22  0359    138 135*   K 3.7 3.7 3.7    100 101   CO2 21* 31* 26   CREATININE 0.6 0.6 0.6   * 268* 256*   PHOS 1.5* 1.9* 1.3*   MG 1.9 1.6 1.4*        No results for input(s): PH, PCO2, PO2, HCO3, POCSATURATED, BE in the last 72 hours.    ASSESSMENT/INTERVENTIONS  Pt in chair with NRB mask endorses mild SOB    Last Vitals: Temp: 97.8 °F (36.6 °C) (10/31 1126)  Pulse: 106 (10/31 1126)  Resp: 18 (10/31 1126)  BP: 132/53 (10/31 1126)  SpO2: 91 % (10/31 1126)  Level of Consciousness: Level of Consciousness (AVPU): alert  Respiratory Effort: Respiratory Effort: Mild, Labored  Expansion/Accessory Muscle Usage: Expansion/Accessory Muscles/Retractions: expansion symmetric  All Lung Field Breath Sounds: All Lung Fields Breath Sounds: Anterior:, Lateral:, rhonchi, coarse, wheezes, expiratory  CHASTITY Breath Sounds: coarse  RUL Breath Sounds: crackles  RML Breath Sounds: crackles, fine  RLL Breath Sounds: diminished  O2 Device/Concentration: 6L NRB  NIPPV: No Surgical airway: No Vent: No  ETCO2 monitored:    Ambu at bedside: Ambu bag with the patient?: Yes, Adult Ambu    Active Orders   Respiratory Care    ACAPELLA TREATMENT Q4H     Frequency: Q4H      Number of Occurrences: Until Specified    Chest physiotherapy BID     Frequency: BID     Number of Occurrences: Until Specified     Order Questions:      Indications: COPIOUS SPUTUM PRODUCTION      Indications: LOBAR OR > ATELECTASIS      Indications: ATELECTASIS PROPHYLAXIS    Chest physiotherapy Q4H     Frequency: Q4H     Number of Occurrences: Until Specified     Order Comments: Call md if patient refuses       Order Questions:      Indications: LOBAR OR > ATELECTASIS    Chest physiotherapy Q4H     Frequency: Q4H     Number of Occurrences: Until Specified     Order Questions:      Indications: LOBAR OR > ATELECTASIS    Inhalation Treatment Q4H     Frequency: Q4H     Number of Occurrences: Until Specified    Inhalation Treatment Q8H     Frequency: Q8H     Number of Occurrences: Until Specified    IPV Q4H     Frequency: Q4H     Number of Occurrences: Until Specified    Oxygen PRN     Frequency: PRN     Number of Occurrences: Until Specified     Order Questions:      Device type: Low flow      Device: Nasal Cannula (1- 5 Liters)      LPM: 2      Titrate O2 per Oxygen Titration Protocol: Yes      To maintain SpO2 goal of: >= 92%      Notify MD of: Inability to achieve desired SpO2; Sudden change in patient status and requires 20% increase in FiO2; Patient requires >60% FiO2    POCT ARTERIAL BLOOD GAS Blood Gas     Frequency: Once     Number of Occurrences: 1 Occurrences     Order Comments: Notify Physician if: see parameters below.       Order Questions:      Component: Blood Gas       RECOMMENDATIONS  ?  We recommend: RRT Recs: First attempt at obtaining ABG failed.  Patient subsequently recovered SpO2 back up to mid 90s.  Switched to 5LPM [place]. Awaiting CXR.  Will follow up.     ESCALATION        FOLLOW-UP    Disposition: Remain in room 1059.    Please call back the Rapid Response RTChuck RRT at x [phone] for any questions or concerns.

## 2022-10-31 NOTE — PT/OT/SLP PROGRESS
Occupational Therapy   Co-Treatment  Co-treatment with PT for maximal pt participation, safety, and activity tolerance    Name: Sussy Costa  MRN: [id number]  Admitting Diagnosis:  Colonic mass  6 Days Post-Op    Recommendations:     Discharge Recommendations: nursing facility, skilled  Discharge Equipment Recommendations:  shower chair  Barriers to discharge:  None    Assessment:     Sussy Costa is a 87 y.o. female with a medical diagnosis of Colonic mass.  Pt. desatted throughout the session with a SPO2 of 80. Pt. Vitals were monitored and nurse present pt. O2 increase to 5 L and SPO2 increased to 87. Performance deficits affecting function are weakness, impaired self care skills, impaired cardiopulmonary response to activity, impaired endurance, pain, decreased safety awareness, decreased ROM, decreased lower extremity function.     Rehab Prognosis:  Good; patient would benefit from acute skilled OT services to address these deficits and reach maximum level of function.       Plan:     Patient to be seen 3 x/week to address the above listed problems via self-care/home management, therapeutic exercises, therapeutic activities, neuromuscular re-education  Plan of Care Expires: 11/26/22  Plan of Care Reviewed with: patient    Subjective     Pain/Comfort:  Pain Rating 1: 6/10  Location - Orientation 1: generalized  Location 1: abdomen  Pain Addressed 1: Reposition    Objective:     Communicated with: RN prior to session.  Patient found HOB elevated with telemetry, oxygen, ca catheter upon OT entry to room.    General Precautions: Standard, fall   Orthopedic Precautions:N/A   Braces:    Respiratory Status: Nasal cannula, flow 1 L/min pt. desatted and RN monitored vital and increased pt. To 5 flow 1 L/min     Occupational Performance:     Bed Mobility:    Patient completed Rolling/Turning to Left with  stand by assistance  Patient completed Scooting/Bridging with stand by assistance  Patient completed Supine to  Sit with stand by assistance     Functional Mobility/Transfers:  Patient completed Sit <> Stand Transfer with contact guard assistance  with  rolling walker   Patient completed Bed <> Chair Transfer using Step Transfer technique with contact guard assistance with rolling walker  Patient completed Toilet Transfer Step Transfer technique with minimum assistance with  rolling walker  Functional Mobility: Pt. Simulated functional household mobility of from Bed>toilet> chair with a RW with CGA    Activities of Daily Living:  Lower Body Dressing: maximal assistance to zay and doff brief (both standing and seated 1 trial of each completed)   Toileting: maximal assistance to complete pericare and clothing management standing at toilet  Lower Body Dressing: stand by assistance to zay socked seated at EOB using figure 4 method       [facility] 6 Click ADL: 20    Treatment & Education:  Pt educated on role of occupational therapy, POC, and safety during ADLs and functional mobility. Pt and OT discussed importance of safe, continued mobility to optimize daily living skills. Pt verbalized understanding.   Pt completed the following during session:  White board updated during session. Pt given instruction to call for medical staff/nurse for assistance.       Patient left up in chair with all lines intact, call button in reach, and RN notified    GOALS:   Multidisciplinary Problems       Occupational Therapy Goals          Problem: Occupational Therapy    Goal Priority Disciplines Outcome Interventions   Occupational Therapy Goal     OT, PT/OT Ongoing, Progressing    Description: Goals to be met by: 11/26/2022      Patient will increase functional independence with ADLs by performing:    UE Dressing with Modified Indian.  LE Dressing with Stand-by Assistance.  Grooming while standing with Supervision.  Toileting from bedside commode with Supervision for hygiene and clothing management.   Toilet transfer to bedside commode with  Supervision.  Increased functional strength to WFL for self care.  Upper extremity exercise program x10 reps per handout, with supervision.                          Time Tracking:     OT Date of Treatment: 10/31/22  OT Start Time: 0955  OT Stop Time: 1026  OT Total Time (min): 31 min    Billable Minutes:Self Care/Home Management 31    OT/FINA: OT     FINA Visit Number: 0    10/31/2022

## 2022-10-31 NOTE — NURSING
Dr. Mann at bedside.  10 mg IV lopressor ordered, 500 ml LR bolus, and ca cath ordered. Bladder scan for patient showed 600ml of urine in her bladder.

## 2022-11-01 PROBLEM — R33.9 URINARY RETENTION: Status: ACTIVE | Noted: 2022-11-01

## 2022-11-01 PROBLEM — Z74.09 IMPAIRED MOBILITY: Status: ACTIVE | Noted: 2022-11-01

## 2022-11-01 LAB
ANION GAP SERPL CALC-SCNC: 8 MMOL/L (ref 8–16)
BASOPHILS # BLD AUTO: 0.03 K/UL (ref 0–0.2)
BASOPHILS NFR BLD: 0.2 % (ref 0–1.9)
BUN SERPL-MCNC: 11 MG/DL (ref 8–23)
CALCIUM SERPL-MCNC: 7.7 MG/DL (ref 8.7–10.5)
CHLORIDE SERPL-SCNC: 99 MMOL/L (ref 95–110)
CO2 SERPL-SCNC: 28 MMOL/L (ref 23–29)
CREAT SERPL-MCNC: 0.6 MG/DL (ref 0.5–1.4)
CRP SERPL-MCNC: 104.6 MG/L (ref 0–8.2)
DIFFERENTIAL METHOD: ABNORMAL
EOSINOPHIL # BLD AUTO: 0.6 K/UL (ref 0–0.5)
EOSINOPHIL NFR BLD: 4.4 % (ref 0–8)
ERYTHROCYTE [DISTWIDTH] IN BLOOD BY AUTOMATED COUNT: 13.8 % (ref 11.5–14.5)
EST. GFR  (NO RACE VARIABLE): >60 ML/MIN/1.73 M^2
GLUCOSE SERPL-MCNC: 144 MG/DL (ref 70–110)
HCT VFR BLD AUTO: 30.5 % (ref 37–48.5)
HGB BLD-MCNC: 9.7 G/DL (ref 12–16)
IMM GRANULOCYTES # BLD AUTO: 0.13 K/UL (ref 0–0.04)
IMM GRANULOCYTES NFR BLD AUTO: 0.9 % (ref 0–0.5)
LYMPHOCYTES # BLD AUTO: 0.7 K/UL (ref 1–4.8)
LYMPHOCYTES NFR BLD: 4.6 % (ref 18–48)
MAGNESIUM SERPL-MCNC: 1.3 MG/DL (ref 1.6–2.6)
MCH RBC QN AUTO: 29.3 PG (ref 27–31)
MCHC RBC AUTO-ENTMCNC: 31.8 G/DL (ref 32–36)
MCV RBC AUTO: 92 FL (ref 82–98)
MONOCYTES # BLD AUTO: 0.8 K/UL (ref 0.3–1)
MONOCYTES NFR BLD: 5.6 % (ref 4–15)
NEUTROPHILS # BLD AUTO: 11.9 K/UL (ref 1.8–7.7)
NEUTROPHILS NFR BLD: 84.3 % (ref 38–73)
NRBC BLD-RTO: 0 /100 WBC
PHOSPHATE SERPL-MCNC: 1.1 MG/DL (ref 2.7–4.5)
PLATELET # BLD AUTO: 184 K/UL (ref 150–450)
PMV BLD AUTO: 11.4 FL (ref 9.2–12.9)
POCT GLUCOSE: 130 MG/DL (ref 70–110)
POCT GLUCOSE: 136 MG/DL (ref 70–110)
POCT GLUCOSE: 215 MG/DL (ref 70–110)
POCT GLUCOSE: 241 MG/DL (ref 70–110)
POCT GLUCOSE: 301 MG/DL (ref 70–110)
POCT GLUCOSE: 315 MG/DL (ref 70–110)
POCT GLUCOSE: 332 MG/DL (ref 70–110)
POCT GLUCOSE: 394 MG/DL (ref 70–110)
POCT GLUCOSE: 412 MG/DL (ref 70–110)
POTASSIUM SERPL-SCNC: 3.6 MMOL/L (ref 3.5–5.1)
RBC # BLD AUTO: 3.31 M/UL (ref 4–5.4)
SODIUM SERPL-SCNC: 135 MMOL/L (ref 136–145)
WBC # BLD AUTO: 14.1 K/UL (ref 3.9–12.7)

## 2022-11-01 PROCEDURE — 51798 US URINE CAPACITY MEASURE: CPT

## 2022-11-01 PROCEDURE — 85025 COMPLETE CBC W/AUTO DIFF WBC: CPT | Performed by: STUDENT IN AN ORGANIZED HEALTH CARE EDUCATION/TRAINING PROGRAM

## 2022-11-01 PROCEDURE — 27000221 HC OXYGEN, UP TO 24 HOURS

## 2022-11-01 PROCEDURE — 36415 COLL VENOUS BLD VENIPUNCTURE: CPT | Performed by: STUDENT IN AN ORGANIZED HEALTH CARE EDUCATION/TRAINING PROGRAM

## 2022-11-01 PROCEDURE — 94640 AIRWAY INHALATION TREATMENT: CPT

## 2022-11-01 PROCEDURE — 99900035 HC TECH TIME PER 15 MIN (STAT)

## 2022-11-01 PROCEDURE — 80048 BASIC METABOLIC PNL TOTAL CA: CPT | Performed by: STUDENT IN AN ORGANIZED HEALTH CARE EDUCATION/TRAINING PROGRAM

## 2022-11-01 PROCEDURE — 63600175 PHARM REV CODE 636 W HCPCS

## 2022-11-01 PROCEDURE — 25000242 PHARM REV CODE 250 ALT 637 W/ HCPCS: Performed by: INTERNAL MEDICINE

## 2022-11-01 PROCEDURE — 63600175 PHARM REV CODE 636 W HCPCS: Performed by: STUDENT IN AN ORGANIZED HEALTH CARE EDUCATION/TRAINING PROGRAM

## 2022-11-01 PROCEDURE — 63700000 PHARM REV CODE 250 ALT 637 W/O HCPCS: Performed by: INTERNAL MEDICINE

## 2022-11-01 PROCEDURE — 86140 C-REACTIVE PROTEIN: CPT | Performed by: STUDENT IN AN ORGANIZED HEALTH CARE EDUCATION/TRAINING PROGRAM

## 2022-11-01 PROCEDURE — 25000003 PHARM REV CODE 250: Performed by: STUDENT IN AN ORGANIZED HEALTH CARE EDUCATION/TRAINING PROGRAM

## 2022-11-01 PROCEDURE — 83735 ASSAY OF MAGNESIUM: CPT | Performed by: STUDENT IN AN ORGANIZED HEALTH CARE EDUCATION/TRAINING PROGRAM

## 2022-11-01 PROCEDURE — 99222 PR INITIAL HOSPITAL CARE,LEVL II: ICD-10-PCS | Mod: ,,, | Performed by: NURSE PRACTITIONER

## 2022-11-01 PROCEDURE — 25000003 PHARM REV CODE 250: Performed by: NURSE PRACTITIONER

## 2022-11-01 PROCEDURE — 99222 1ST HOSP IP/OBS MODERATE 55: CPT | Mod: ,,, | Performed by: NURSE PRACTITIONER

## 2022-11-01 PROCEDURE — 94668 MNPJ CHEST WALL SBSQ: CPT

## 2022-11-01 PROCEDURE — 84100 ASSAY OF PHOSPHORUS: CPT | Performed by: STUDENT IN AN ORGANIZED HEALTH CARE EDUCATION/TRAINING PROGRAM

## 2022-11-01 PROCEDURE — 63600175 PHARM REV CODE 636 W HCPCS: Performed by: NURSE PRACTITIONER

## 2022-11-01 PROCEDURE — 20600001 HC STEP DOWN PRIVATE ROOM

## 2022-11-01 PROCEDURE — 25000003 PHARM REV CODE 250

## 2022-11-01 PROCEDURE — 94761 N-INVAS EAR/PLS OXIMETRY MLT: CPT

## 2022-11-01 RX ORDER — IBUPROFEN 200 MG
24 TABLET ORAL
Status: CANCELLED | OUTPATIENT
Start: 2022-11-01

## 2022-11-01 RX ORDER — INSULIN ASPART 100 [IU]/ML
2 INJECTION, SOLUTION INTRAVENOUS; SUBCUTANEOUS ONCE
Status: COMPLETED | OUTPATIENT
Start: 2022-11-01 | End: 2022-11-01

## 2022-11-01 RX ORDER — FUROSEMIDE 10 MG/ML
20 INJECTION INTRAMUSCULAR; INTRAVENOUS ONCE
Status: COMPLETED | OUTPATIENT
Start: 2022-11-01 | End: 2022-11-01

## 2022-11-01 RX ORDER — CARVEDILOL 3.12 MG/1
3.12 TABLET ORAL 2 TIMES DAILY
Status: DISCONTINUED | OUTPATIENT
Start: 2022-11-01 | End: 2022-11-02 | Stop reason: HOSPADM

## 2022-11-01 RX ORDER — INSULIN ASPART 100 [IU]/ML
1-10 INJECTION, SOLUTION INTRAVENOUS; SUBCUTANEOUS
Status: CANCELLED | OUTPATIENT
Start: 2022-11-01

## 2022-11-01 RX ADMIN — INSULIN ASPART 2 UNITS: 100 INJECTION, SOLUTION INTRAVENOUS; SUBCUTANEOUS at 11:11

## 2022-11-01 RX ADMIN — IPRATROPIUM BROMIDE AND ALBUTEROL SULFATE 3 ML: 2.5; .5 SOLUTION RESPIRATORY (INHALATION) at 09:11

## 2022-11-01 RX ADMIN — FUROSEMIDE 20 MG: 10 INJECTION, SOLUTION INTRAMUSCULAR; INTRAVENOUS at 12:11

## 2022-11-01 RX ADMIN — AZITHROMYCIN MONOHYDRATE 500 MG: 250 TABLET ORAL at 08:11

## 2022-11-01 RX ADMIN — GUAIFENESIN 200 MG: 200 SOLUTION ORAL at 05:11

## 2022-11-01 RX ADMIN — ENOXAPARIN SODIUM 30 MG: 100 INJECTION SUBCUTANEOUS at 05:11

## 2022-11-01 RX ADMIN — IPRATROPIUM BROMIDE AND ALBUTEROL SULFATE 3 ML: 2.5; .5 SOLUTION RESPIRATORY (INHALATION) at 07:11

## 2022-11-01 RX ADMIN — LATANOPROST 1 DROP: 50 SOLUTION OPHTHALMIC at 08:11

## 2022-11-01 RX ADMIN — FLUTICASONE FUROATE AND VILANTEROL TRIFENATATE 1 PUFF: 200; 25 POWDER RESPIRATORY (INHALATION) at 09:11

## 2022-11-01 RX ADMIN — INSULIN ASPART 2 UNITS: 100 INJECTION, SOLUTION INTRAVENOUS; SUBCUTANEOUS at 01:11

## 2022-11-01 RX ADMIN — SODIUM CHLORIDE SOLN NEBU 3% 4 ML: 3 NEBU SOLN at 07:11

## 2022-11-01 RX ADMIN — IPRATROPIUM BROMIDE AND ALBUTEROL SULFATE 3 ML: 2.5; .5 SOLUTION RESPIRATORY (INHALATION) at 03:11

## 2022-11-01 RX ADMIN — INSULIN DETEMIR 2 UNITS: 100 INJECTION, SOLUTION SUBCUTANEOUS at 09:11

## 2022-11-01 RX ADMIN — IPRATROPIUM BROMIDE AND ALBUTEROL SULFATE 3 ML: 2.5; .5 SOLUTION RESPIRATORY (INHALATION) at 11:11

## 2022-11-01 RX ADMIN — GUAIFENESIN 200 MG: 200 SOLUTION ORAL at 06:11

## 2022-11-01 RX ADMIN — GUAIFENESIN 200 MG: 200 SOLUTION ORAL at 01:11

## 2022-11-01 RX ADMIN — INSULIN DETEMIR 2 UNITS: 100 INJECTION, SOLUTION SUBCUTANEOUS at 08:11

## 2022-11-01 RX ADMIN — INSULIN ASPART 5 UNITS: 100 INJECTION, SOLUTION INTRAVENOUS; SUBCUTANEOUS at 09:11

## 2022-11-01 RX ADMIN — ATORVASTATIN CALCIUM 20 MG: 20 TABLET, FILM COATED ORAL at 08:11

## 2022-11-01 RX ADMIN — GUAIFENESIN 200 MG: 200 SOLUTION ORAL at 10:11

## 2022-11-01 RX ADMIN — CARVEDILOL 3.12 MG: 3.12 TABLET, FILM COATED ORAL at 08:11

## 2022-11-01 RX ADMIN — GUAIFENESIN 200 MG: 200 SOLUTION ORAL at 02:11

## 2022-11-01 RX ADMIN — METOROPROLOL TARTRATE 5 MG: 5 INJECTION, SOLUTION INTRAVENOUS at 06:11

## 2022-11-01 RX ADMIN — TAMSULOSIN HYDROCHLORIDE 0.8 MG: 0.4 CAPSULE ORAL at 08:11

## 2022-11-01 RX ADMIN — SODIUM CHLORIDE SOLN NEBU 3% 4 ML: 3 NEBU SOLN at 11:11

## 2022-11-01 RX ADMIN — GUAIFENESIN 200 MG: 200 SOLUTION ORAL at 09:11

## 2022-11-01 RX ADMIN — SODIUM CHLORIDE SOLN NEBU 3% 4 ML: 3 NEBU SOLN at 12:11

## 2022-11-01 RX ADMIN — SODIUM CHLORIDE SOLN NEBU 3% 4 ML: 3 NEBU SOLN at 09:11

## 2022-11-01 RX ADMIN — MONTELUKAST 10 MG: 10 TABLET, FILM COATED ORAL at 08:11

## 2022-11-01 RX ADMIN — IPRATROPIUM BROMIDE AND ALBUTEROL SULFATE 3 ML: 2.5; .5 SOLUTION RESPIRATORY (INHALATION) at 12:11

## 2022-11-01 RX ADMIN — TIOTROPIUM BROMIDE INHALATION SPRAY 2 PUFF: 3.12 SPRAY, METERED RESPIRATORY (INHALATION) at 09:11

## 2022-11-01 NOTE — PLAN OF CARE
Problem: Infection  Goal: Absence of Infection Signs and Symptoms  Outcome: Ongoing, Progressing     Problem: Adult Inpatient Plan of Care  Goal: Plan of Care Review  Outcome: Ongoing, Progressing  Goal: Patient-Specific Goal (Individualized)  Outcome: Ongoing, Progressing  Goal: Absence of Hospital-Acquired Illness or Injury  Outcome: Ongoing, Progressing  Goal: Optimal Comfort and Wellbeing  Outcome: Ongoing, Progressing  Goal: Readiness for Transition of Care  Outcome: Ongoing, Progressing     Problem: Adjustment to Illness (Sepsis/Septic Shock)  Goal: Optimal Coping  Outcome: Ongoing, Progressing     Problem: Bleeding (Sepsis/Septic Shock)  Goal: Absence of Bleeding  Outcome: Ongoing, Progressing     Problem: Glycemic Control Impaired (Sepsis/Septic Shock)  Goal: Blood Glucose Level Within Desired Range  Outcome: Ongoing, Progressing     Problem: Infection Progression (Sepsis/Septic Shock)  Goal: Absence of Infection Signs and Symptoms  Outcome: Ongoing, Progressing     Problem: Nutrition Impaired (Sepsis/Septic Shock)  Goal: Optimal Nutrition Intake  Outcome: Ongoing, Progressing     Problem: Fluid Imbalance (Pneumonia)  Goal: Fluid Balance  Outcome: Ongoing, Progressing     Problem: Infection (Pneumonia)  Goal: Resolution of Infection Signs and Symptoms  Outcome: Ongoing, Progressing     Problem: Respiratory Compromise (Pneumonia)  Goal: Effective Oxygenation and Ventilation  Outcome: Ongoing, Progressing     Problem: Diabetes Comorbidity  Goal: Blood Glucose Level Within Targeted Range  Outcome: Ongoing, Progressing     Problem: Fall Injury Risk  Goal: Absence of Fall and Fall-Related Injury  Outcome: Ongoing, Progressing     Problem: Skin Injury Risk Increased  Goal: Skin Health and Integrity  Outcome: Ongoing, Progressing     Problem: Fluid Volume Deficit  Goal: Fluid Balance  Outcome: Ongoing, Progressing

## 2022-11-01 NOTE — HOSPITAL COURSE
10/31/22: Participated w/ PT. Bed mob SBA. Sit to stand CGA w/ RW. Ambulated 12 ftx2 with rolling walker and contact guard assistance. [facility] 16 w/ PT.   Participated w/ OT. [facility] 20.

## 2022-11-01 NOTE — PLAN OF CARE
11/01/22 1234   Post-Acute Status   Post-Acute Authorization Placement   Post-Acute Placement Status Set-up Complete/Auth obtained   Coverage [facility] Resources/Appts/Education Provided Post-Acute resouces added to AVS   Discharge Delays None known at this time   Discharge Plan   Discharge Plan A Rehab   Discharge Plan B Rehab     Patient Choose [facility]. Close to home.    DANISHA OscarN, RN    EXT [id number]

## 2022-11-01 NOTE — PLAN OF CARE
Problem: Infection  Goal: Absence of Infection Signs and Symptoms  Outcome: Ongoing, Progressing     Problem: Adult Inpatient Plan of Care  Goal: Plan of Care Review  Outcome: Ongoing, Progressing     Problem: Bleeding (Sepsis/Septic Shock)  Goal: Absence of Bleeding  Outcome: Ongoing, Progressing     Problem: Infection Progression (Sepsis/Septic Shock)  Goal: Absence of Infection Signs and Symptoms  Outcome: Ongoing, Progressing     Problem: Diabetes Comorbidity  Goal: Blood Glucose Level Within Targeted Range  Outcome: Ongoing, Progressing     Problem: Fall Injury Risk  Goal: Absence of Fall and Fall-Related Injury  Outcome: Ongoing, Progressing

## 2022-11-01 NOTE — HPI
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 Patient presented to [facility] on 10/25/22 for RLQ pain. CTAP with concern for LBO with probable mass effect serving as point of obstruction. CRS consulted and recommended surgical intervention. S/p open right hemicolectomy with primary anastomosis 10/25/22. Postoperative course complicated by dyspnea with right middle lobe atelectasis (started on Rocephin) and hypotension (Given IV bolus with mild improvements).     Functional History: Patient lives with  in a single story home with 0 steps to enter.  Prior to admission, I. DME: none.

## 2022-11-01 NOTE — SUBJECTIVE & OBJECTIVE
Past Medical History:   Diagnosis Date    Asthma     Cyst of pancreas     liver    Diabetes mellitus type II     Eczema of hand     Glaucoma     Hyperlipidemia     Hypertension     Lichen planus     gums    Osteoporosis     Pulmonary nodules      Past Surgical History:   Procedure Laterality Date    BRONCHOSCOPY N/A 5/13/2022    Procedure: Bronchoscopy;  Surgeon: Tina Diagnostic Provider;  Location: [facility] OR [address] [place] [address];  Service: Anesthesiology;  Laterality: N/A;    CATARACT EXTRACTION, BILATERAL      CHOLECYSTECTOMY      COLECTOMY, RIGHT Right 10/25/2022    Procedure: COLECTOMY, RIGHT;  Surgeon: Jass Holman MD;  [facility];  Service: Colon and Rectal;  Laterality: Right;    ENDOSCOPIC ULTRASOUND OF UPPER GASTROINTESTINAL TRACT N/A 4/22/2022    Procedure: ULTRASOUND, UPPER GI TRACT, ENDOSCOPIC;  Surgeon: Max Rosado MD;  Location: [facility] ENDO (2ND FLR);  Service: Endoscopy;  Laterality: N/A;  urgent EUS (linear) for abnormal CT scan with dilated PD and increased cyst of pancreas cyst.  pap 44 mmHg  4/13:fully vaccinated. instructions via portal-SC    EPIDURAL STEROID INJECTION INTO LUMBAR SPINE N/A 11/8/2018    Procedure: Injection-steroid-epidural-lumbar L5-S1;  Surgeon: Nicci Osorio Jr., MD;  Location: [facility] PAIN MGT;  Service: Pain Management;  Laterality: N/A;    FUNCTIONAL ENDOSCOPIC SINUS SURGERY (FESS) USING COMPUTER-ASSISTED NAVIGATION N/A 6/17/2019    Procedure: FESS, USING COMPUTER-ASSISTED NAVIGATION;  Surgeon: Rosangela Isabel MD;  Location: [facility] OR;  Service: ENT;  Laterality: N/A;  video    HYSTERECTOMY      nasal polyps       Review of patient's allergies indicates:   Allergen Reactions    Aspirin Other (See Comments)     Asthma    TRIAD OF NASAL POLYPS, ASA  ALLERGY AND ASTHMA.        Aspirin Other (See Comments)    Nsaids (non-steroidal anti-inflammatory drug) Other (See Comments)     AVOID DUE TO TRIAD OF ASA ALLERGY, NASAL POLYPS,AND ASTHMA    Penicillin       Other reaction(s): Rash    9/30/20: tolerates ceftriaxone       Scheduled Medications:    albuterol-ipratropium  3 mL Nebulization Q4H    atorvastatin  20 mg Oral QHS    azithromycin  500 mg Oral Daily    cefTRIAXone (ROCEPHIN) IVPB  2 g Intravenous Q24H    enoxaparin  30 mg Subcutaneous Daily    fluticasone furoate-vilanteroL  1 puff Inhalation Daily    guaiFENesin 100 mg/5 ml  200 mg Oral Q4H    insulin detemir U-100  2 Units Subcutaneous BID    latanoprost  1 drop Both Eyes QHS    metoprolol  5 mg Intravenous Q6H    metoprolol  5 mg Intravenous Once    montelukast  10 mg Oral QHS    sodium chloride 3%  4 mL Nebulization Q6H    tamsulosin  0.8 mg Oral Daily    tiotropium bromide  2 puff Inhalation Daily       PRN Medications: dextrose 10%, dextrose 10%, glucagon (human recombinant), HYDROmorphone, influenza 65up-adj, insulin aspart U-100, naloxone, ondansetron, prochlorperazine, sodium chloride 0.9%    Family History       Problem Relation (Age of Onset)    Heart disease Father, Brother    Hypertension Brother          Tobacco Use    Smoking status: Never    Smokeless tobacco: Never   Substance and Sexual Activity    Alcohol use: Yes     Comment: socially    Drug use: No    Sexual activity: Yes     Partners: Male     Birth control/protection: Post-menopausal     Review of Systems   Constitutional:  Positive for activity change. Negative for fatigue and fever.   HENT:  Negative for sore throat and trouble swallowing.    Eyes:  Negative for visual disturbance.   Respiratory:  Negative for cough and shortness of breath.    Cardiovascular:  Negative for chest pain and leg swelling.   Gastrointestinal:  Negative for abdominal distention and abdominal pain.   Genitourinary:  Negative for difficulty urinating.   Musculoskeletal:  Positive for gait problem. Negative for back pain.   Skin:  Negative for color change.   Neurological:  Positive for weakness. Negative for dizziness, light-headedness and headaches.    Psychiatric/Behavioral:  Negative for agitation and confusion.    Objective:     Vital Signs (Most Recent):  Temp: 98.3 °F (36.8 °C) (11/01/22 0742)  Pulse: 88 (11/01/22 0923)  Resp: 16 (11/01/22 0923)  BP: (!) 111/54 (11/01/22 0742)  SpO2: (!) 93 % (11/01/22 0923)      Vital Signs (24h Range):  Temp:  [97.2 °F (36.2 °C)-98.9 °F (37.2 °C)] 98.3 °F (36.8 °C)  Pulse:  [] 88  Resp:  [16-23] 16  SpO2:  [84 %-100 %] 93 %  BP: ()/(50-56) 111/54     Body mass index is 17.31 kg/m².    Physical Exam  Vitals and nursing note reviewed.   Constitutional:       Appearance: Normal appearance. She is well-developed.   HENT:      Nose: Nose normal.      Mouth/Throat:      Mouth: Mucous membranes are moist.   Eyes:      Pupils: Pupils are equal, round, and reactive to light.   Pulmonary:      Effort: Pulmonary effort is normal. No respiratory distress.   Abdominal:      General: Abdomen is flat.      Palpations: Abdomen is soft.      Comments: Incision in place   Musculoskeletal:         General: Normal range of motion.      Cervical back: Normal range of motion and neck supple.   Skin:     General: Skin is warm and dry.   Neurological:      Mental Status: She is alert. Mental status is at baseline.      Motor: Weakness present.      Gait: Gait abnormal.   Psychiatric:         Mood and Affect: Mood normal.     NEUROLOGICAL EXAMINATION:     CRANIAL NERVES     CN III, IV, VI   Pupils are equal, round, and reactive to light.    Diagnostic Results: Labs: Reviewed  ECG: Reviewed  CT: Reviewed

## 2022-11-01 NOTE — ASSESSMENT & PLAN NOTE
- CTAP with concern for LBO with probable mass effect serving as point of obstruction.  - CRS consulted and recommended surgical intervention.   - S/p open right hemicolectomy with primary anastomosis 10/25/22

## 2022-11-01 NOTE — CONSULTS
Inpatient consult to Physical Medicine Rehab  Consult performed by: Nancy Marina NP  Consult ordered by: Kayla Baker NP    Consult received.  Reviewed patient history and current admission.  PM&R following.    Nancy Marina NP  Physical Medicine & Rehabilitation   11/01/2022

## 2022-11-01 NOTE — PROGRESS NOTES
[facility]  Colorectal Surgery  Progress Note    Patient Name: Sussy Costa  MRN: [id number]  Admission Date: 10/25/2022  Hospital Length of Stay: 7 days  Attending Physician: Jass Holman MD    Subjective:     Interval History: no acute events overntie, resp status improved but still requiring oxygen 3-5 liter and  IPV  Post-Op Info:  Procedure(s) (LRB):  COLECTOMY, RIGHT (Right)   7 Days Post-Op      Medications:  Continuous Infusions:  Scheduled Meds:   albuterol-ipratropium  3 mL Nebulization Q4H    atorvastatin  20 mg Oral QHS    enoxaparin  30 mg Subcutaneous Daily    fluticasone furoate-vilanteroL  1 puff Inhalation Daily    furosemide (LASIX) injection  20 mg Intravenous Once    guaiFENesin 100 mg/5 ml  200 mg Oral Q4H    insulin detemir U-100  2 Units Subcutaneous BID    latanoprost  1 drop Both Eyes QHS    metoprolol  5 mg Intravenous Q6H    metoprolol  5 mg Intravenous Once    montelukast  10 mg Oral QHS    sodium chloride 3%  4 mL Nebulization Q6H    tamsulosin  0.8 mg Oral Daily    tiotropium bromide  2 puff Inhalation Daily     PRN Meds:   dextrose 10%    dextrose 10%    glucagon (human recombinant)    HYDROmorphone    influenza 65up-adj    insulin aspart U-100    naloxone    ondansetron    prochlorperazine    sodium chloride 0.9%        Objective:     Vital Signs (Most Recent):  Temp: 98.3 °F (36.8 °C) (11/01/22 0742)  Pulse: 92 (11/01/22 1207)  Resp: 18 (11/01/22 1143)  BP: (!) 111/54 (11/01/22 0742)  SpO2: (!) 94 % (11/01/22 1143)   Vital Signs (24h Range):  Temp:  [97.2 °F (36.2 °C)-98.9 °F (37.2 °C)] 98.3 °F (36.8 °C)  Pulse:  [] 92  Resp:  [16-23] 18  SpO2:  [91 %-100 %] 94 %  BP: ()/(50-56) 111/54     Intake/Output - Last 3 Shifts         10/30 0700  10/31 0659 10/31 0700  11/01 0659 11/01 0700 11/02 0659    P.O. 150      IV Piggyback 1000      Total Intake(mL/kg) 1150 (24.4)      Urine (mL/kg/hr) 1250 (1.1) 1200 (1.1)     Stool 0      Total  Output 1250 1200     Net -100 -1200            Stool Occurrence 1 x              Physical Exam  Vitals and nursing note reviewed.   Constitutional:       Appearance: She is well-developed.   HENT:      Head: Normocephalic.   Eyes:      Pupils: Pupils are equal, round, and reactive to light.   Cardiovascular:      Rate and Rhythm: Normal rate and regular rhythm.      Heart sounds: Normal heart sounds.   Pulmonary:      Effort: Pulmonary effort is normal. No respiratory distress.      Breath sounds: Normal breath sounds. No wheezing or rales.   Abdominal:      Palpations: Abdomen is soft. There is no mass.      Tenderness: There is no guarding or rebound.      Comments: Abd inc line healing well  Having sotll eating well   Skin:     General: Skin is warm and dry.   Neurological:      Mental Status: She is alert and oriented to person, place, and time.   Psychiatric:         Behavior: Behavior normal.         Thought Content: Thought content normal.         Judgment: Judgment normal.           Significant Labs:  BMP (Last 3 Results):   Recent Labs   Lab 10/30/22  2314 10/31/22  0359 11/01/22  0330   * 256* 144*    135* 135*   K 3.7 3.7 3.6    101 99   CO2 31* 26 28   BUN 11 11 11   CREATININE 0.6 0.6 0.6   CALCIUM 8.0* 7.8* 7.7*   MG 1.6 1.4* 1.3*     CBC (Last 3 Results):   Recent Labs   Lab 10/30/22  2314 10/31/22  0359 11/01/22  0330   WBC 12.19 10.81 14.10*   RBC 3.97* 3.58* 3.31*   HGB 11.6* 10.5* 9.7*   HCT 36.0* 32.6* 30.5*    219 184   MCV 91 91 92   MCH 29.2 29.3 29.3   MCHC 32.2 32.2 31.8*       Significant Diagnostics:  None    Assessment/Plan:     * Colonic mass  87 y.o. F dx w LBO due to colon mass s/p R colectomy post op day 3. Doing well post surgical standpoint, however postoperative course complicated by dyspnea with right middle lobe atelectasis.     1) had BM  2) Bladder scans for urinary retention  3) Appreciate pulmonary input for respiratory disease  4) Continue pain  control  5) Advance to reg diet   6) continue metoprolol if BP stable    Urinary retention  Developed urinary retention 10/30, ca replaced  Attempt another voiding trial      Impaired mobility  PT/OT  Consult to rehab    Hyponatremia  Monitor labs  No IVF    Asthma, moderate persistent  Continue home meds  Monitor resp status  Continue aggressiver pulmonary toileting    Atelectasis  Chest xray with atelectasis  Continue resp tx every 4 hours  Add robitusin  Add pulmonary PT  Consult to pulmonary  Completed course of  antibiotics per pulmonary  oxgyen desat after PT, quickely responded to increase oxygen            Kayla Baker NP  Colorectal Surgery  [facility]

## 2022-11-01 NOTE — ASSESSMENT & PLAN NOTE
Chest xray with atelectasis  Continue resp tx every 4 hours  Add heathin  Add pulmonary PT  Consult to pulmonary  Completed course of  antibiotics per pulmonary  oxgyen desat after PT, quickely responded to increase oxygen

## 2022-11-01 NOTE — ASSESSMENT & PLAN NOTE
- Postoperative course complicated by dyspnea with right middle lobe atelectasis   - started on Rocephin

## 2022-11-01 NOTE — CONSULTS
[facility]  Physical Medicine & Rehab  Consult Note    Patient Name: Sussy Costa  MRN: [id number]  Admission Date: 10/25/2022  Hospital Length of Stay: 7 days  Attending Physician: Jass Holman MD     Inpatient consult to Physical Medicine & Rehabilitation  Consult performed by: Marisol Hernández NP  Consult requested by:  Jass Holman MD    Collaborating Physician: Mable Newton MD  Reason for Consult:  Assess rehabilitation needs     Consults  Subjective:     Principal Problem: Colonic mass    HPI: Sussy Costa is a 87-year-old female with PMHx of HTN, DM, HLD, Glaucoma, Athma, bilateral pulmonary nodules stable on prior imaging, and 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 [place] [facility] [place] for RLQ pain. CTAP with concern for LBO with probable mass effect serving as point of obstruction. CRS consulted and recommended surgical intervention. S/p open right hemicolectomy with primary anastomosis 10/25/22. Postoperative course complicated by dyspnea with right middle lobe atelectasis (started on Rocephin) and hypotension (Given IV bolus with mild improvements).     Functional History: Patient lives with  in a single story home with 0 steps to enter.  Prior to admission, I. DME: none.       Hospital Course: 10/31/22: Participated w/ PT. Bed mob SBA. Sit to stand CGA w/ RW. Ambulated 12 ftx2 with rolling walker and contact guard assistance. [facility] 16 w/ PT.   Participated w/ OT. [facility] 20.       Past Medical History:   Diagnosis Date    Asthma     Cyst of pancreas     liver    Diabetes mellitus type II     Eczema of hand     Glaucoma     Hyperlipidemia     Hypertension     Lichen planus     gums    Osteoporosis     Pulmonary nodules      Past Surgical History:   Procedure Laterality Date    BRONCHOSCOPY N/A 5/13/2022    Procedure: Bronchoscopy;  Surgeon: Tina Diagnostic Provider;  Location: [facility] OR [address] [place] [address];   Service: Anesthesiology;  Laterality: N/A;    CATARACT EXTRACTION, BILATERAL      CHOLECYSTECTOMY      COLECTOMY, RIGHT Right 10/25/2022    Procedure: COLECTOMY, RIGHT;  Surgeon: Jass Holman MD;  Location: [facility] OR 2ND FLR;  Service: Colon and Rectal;  Laterality: Right;    ENDOSCOPIC ULTRASOUND OF UPPER GASTROINTESTINAL TRACT N/A 4/22/2022    Procedure: ULTRASOUND, UPPER GI TRACT, ENDOSCOPIC;  Surgeon: Max Rosado MD;  Location: [facility] ENDO (2ND FLR);  Service: Endoscopy;  Laterality: N/A;  urgent EUS (linear) for abnormal CT scan with dilated PD and increased cyst of pancreas cyst.  pap 44 mmHg  4/13:fully vaccinated. instructions via portal-SC    EPIDURAL STEROID INJECTION INTO LUMBAR SPINE N/A 11/8/2018    Procedure: Injection-steroid-epidural-lumbar L5-S1;  Surgeon: Nicci Osorio Jr., MD;  [facility];  Service: Pain Management;  Laterality: N/A;    FUNCTIONAL ENDOSCOPIC SINUS SURGERY (FESS) USING COMPUTER-ASSISTED NAVIGATION N/A 6/17/2019    Procedure: FESS, USING COMPUTER-ASSISTED NAVIGATION;  Surgeon: Rosangela Isabel MD;  Location: [facility] OR;  Service: ENT;  Laterality: N/A;  video    HYSTERECTOMY      nasal polyps       Review of patient's allergies indicates:   Allergen Reactions    Aspirin Other (See Comments)     Asthma    TRIAD OF NASAL POLYPS, ASA  ALLERGY AND ASTHMA.        Aspirin Other (See Comments)    Nsaids (non-steroidal anti-inflammatory drug) Other (See Comments)     AVOID DUE TO TRIAD OF ASA ALLERGY, NASAL POLYPS,AND ASTHMA    Penicillin      Other reaction(s): Rash    9/30/20: tolerates ceftriaxone       Scheduled Medications:    albuterol-ipratropium  3 mL Nebulization Q4H    atorvastatin  20 mg Oral QHS    azithromycin  500 mg Oral Daily    cefTRIAXone (ROCEPHIN) IVPB  2 g Intravenous Q24H    enoxaparin  30 mg Subcutaneous Daily    fluticasone furoate-vilanteroL  1 puff Inhalation Daily    guaiFENesin 100 mg/5 ml  200 mg Oral Q4H     insulin detemir U-100  2 Units Subcutaneous BID    latanoprost  1 drop Both Eyes QHS    metoprolol  5 mg Intravenous Q6H    metoprolol  5 mg Intravenous Once    montelukast  10 mg Oral QHS    sodium chloride 3%  4 mL Nebulization Q6H    tamsulosin  0.8 mg Oral Daily    tiotropium bromide  2 puff Inhalation Daily       PRN Medications: dextrose 10%, dextrose 10%, glucagon (human recombinant), HYDROmorphone, influenza 65up-adj, insulin aspart U-100, naloxone, ondansetron, prochlorperazine, sodium chloride 0.9%    Family History       Problem Relation (Age of Onset)    Heart disease Father, Brother    Hypertension Brother          Tobacco Use    Smoking status: Never    Smokeless tobacco: Never   Substance and Sexual Activity    Alcohol use: Yes     Comment: socially    Drug use: No    Sexual activity: Yes     Partners: Male     Birth control/protection: Post-menopausal     Review of Systems   Constitutional:  Positive for activity change. Negative for fatigue and fever.   HENT:  Negative for sore throat and trouble swallowing.    Eyes:  Negative for visual disturbance.   Respiratory:  Negative for cough and shortness of breath.    Cardiovascular:  Negative for chest pain and leg swelling.   Gastrointestinal:  Negative for abdominal distention and abdominal pain.   Genitourinary:  Negative for difficulty urinating.   Musculoskeletal:  Positive for gait problem. Negative for back pain.   Skin:  Negative for color change.   Neurological:  Positive for weakness. Negative for dizziness, light-headedness and headaches.   Psychiatric/Behavioral:  Negative for agitation and confusion.      Objective:     Vital Signs (Most Recent):  Temp: 98.3 °F (36.8 °C) (11/01/22 0742)  Pulse: 88 (11/01/22 0923)  Resp: 16 (11/01/22 0923)  BP: (!) 111/54 (11/01/22 0742)  SpO2: (!) 93 % (11/01/22 0923)      Vital Signs (24h Range):  Temp:  [97.2 °F (36.2 °C)-98.9 °F (37.2 °C)] 98.3 °F (36.8 °C)  Pulse:  [] 88  Resp:  [16-23]  16  SpO2:  [84 %-100 %] 93 %  BP: ()/(50-56) 111/54     Body mass index is 17.31 kg/m².    Physical Exam  Vitals and nursing note reviewed.   Constitutional:       Appearance: Normal appearance. She is well-developed.   HENT:      Nose: Nose normal.      Mouth/Throat:      Mouth: Mucous membranes are moist.   Eyes:      Pupils: Pupils are equal, round, and reactive to light.   Pulmonary:      Effort: Pulmonary effort is normal. No respiratory distress.   Abdominal:      General: Abdomen is flat.      Palpations: Abdomen is soft.      Comments: Incision in place   Musculoskeletal:         General: Normal range of motion.      Cervical back: Normal range of motion and neck supple.   Skin:     General: Skin is warm and dry.   Neurological:      Mental Status: She is alert. Mental status is at baseline.      Motor: Weakness present.      Gait: Gait abnormal.   Psychiatric:         Mood and Affect: Mood normal.     Diagnostic Results:   Labs: Reviewed  ECG: Reviewed  CT: Reviewed    Assessment/Plan:     * Colonic mass  - CTAP with concern for LBO with probable mass effect serving as point of obstruction.  - CRS consulted and recommended surgical intervention.   - S/p open right hemicolectomy with primary anastomosis 10/25/22    Impaired mobility  - Related to prolonged/acute hospital course.     Recommendations  -  Encourage mobility, OOB in chair at least 3 hours per day, and early ambulation as appropriate  -  PT/OT evaluate and treat  -  Pain management  -  Monitor for and prevent skin breakdown and pressure ulcers  · Early mobility, repositioning/weight shifting every 20-30 minutes when sitting, turn patient every 2 hours, proper mattress/overlay and chair cushioning, pressure relief/heel protector boots  -  DVT prophylaxis    -  Reviewed discharge options (IP rehab, SNF, HH therapy, and OP therapy)    Atelectasis  - Postoperative course complicated by dyspnea with right middle lobe atelectasis   - started on  Rocephin and Azithromycin    PM&R Recommendation:     At this time, the PM&R team has reviewed this patient's ongoing medical case including inpatient diagnosis, medical history, clinical examination, labs, vitals, current social and functional history to provide the post-acute recommendation as follows:     RECOMMENDATIONS: inpatient rehabilitation due to good motivation/participation with therapies, has been determined to tolerate 3 hours of therapy and good potential for recovery. The patient will be admitted for comprehensive interdisciplinary inpatient rehabilitation to address the impairments due to medical diagnosis of Debilty. S/p open right hemicolectomy with primary anastomosis 10/25/22.The patient will benefit from an inpatient rehabilitation program to promote functional recovery, implement compensatory strategies and will undergo assessment for needs for durable medical equipment for safe discharge to the community. This patient will benefit from a coordinated interdisciplinary rehabilitation program services that require close monitoring and treatment with 24-hour rehabilitative nursing and physical/occupational therapies for 3 hours/day for 5 days/week.This interdisciplinary program will be performed under the direction of a physiatrist.    MEDICAL STABILITY:     At this time, barriers for post-acute rehab admission include continued improvement in hypotension and end date of abx.     I will sign off with the transfer of the patient to [facility] liaison who will continue to follow going forward until admission to [facility].     Thank you for your consult.     Marisol Hernández NP  Department of Physical Medicine & Rehab  [facility]

## 2022-11-02 VITALS
WEIGHT: 104 LBS | TEMPERATURE: 98 F | DIASTOLIC BLOOD PRESSURE: 53 MMHG | HEIGHT: 65 IN | RESPIRATION RATE: 18 BRPM | HEART RATE: 83 BPM | BODY MASS INDEX: 17.33 KG/M2 | SYSTOLIC BLOOD PRESSURE: 103 MMHG | OXYGEN SATURATION: 98 %

## 2022-11-02 LAB
ANION GAP SERPL CALC-SCNC: 6 MMOL/L (ref 8–16)
BASOPHILS # BLD AUTO: 0.03 K/UL (ref 0–0.2)
BASOPHILS NFR BLD: 0.3 % (ref 0–1.9)
BUN SERPL-MCNC: 12 MG/DL (ref 8–23)
CALCIUM SERPL-MCNC: 7.5 MG/DL (ref 8.7–10.5)
CHLORIDE SERPL-SCNC: 101 MMOL/L (ref 95–110)
CO2 SERPL-SCNC: 29 MMOL/L (ref 23–29)
CREAT SERPL-MCNC: 0.6 MG/DL (ref 0.5–1.4)
CRP SERPL-MCNC: 89.4 MG/L (ref 0–8.2)
DIFFERENTIAL METHOD: ABNORMAL
EOSINOPHIL # BLD AUTO: 0.8 K/UL (ref 0–0.5)
EOSINOPHIL NFR BLD: 7.5 % (ref 0–8)
ERYTHROCYTE [DISTWIDTH] IN BLOOD BY AUTOMATED COUNT: 13.8 % (ref 11.5–14.5)
EST. GFR  (NO RACE VARIABLE): >60 ML/MIN/1.73 M^2
FINAL PATHOLOGIC DIAGNOSIS: ABNORMAL
GLUCOSE SERPL-MCNC: 137 MG/DL (ref 70–110)
GROSS: ABNORMAL
HCT VFR BLD AUTO: 29.9 % (ref 37–48.5)
HGB BLD-MCNC: 9.5 G/DL (ref 12–16)
IMM GRANULOCYTES # BLD AUTO: 0.13 K/UL (ref 0–0.04)
IMM GRANULOCYTES NFR BLD AUTO: 1.3 % (ref 0–0.5)
LYMPHOCYTES # BLD AUTO: 1 K/UL (ref 1–4.8)
LYMPHOCYTES NFR BLD: 10.2 % (ref 18–48)
Lab: ABNORMAL
MAGNESIUM SERPL-MCNC: 1.4 MG/DL (ref 1.6–2.6)
MCH RBC QN AUTO: 28.8 PG (ref 27–31)
MCHC RBC AUTO-ENTMCNC: 31.8 G/DL (ref 32–36)
MCV RBC AUTO: 91 FL (ref 82–98)
MONOCYTES # BLD AUTO: 0.7 K/UL (ref 0.3–1)
MONOCYTES NFR BLD: 6.5 % (ref 4–15)
NEUTROPHILS # BLD AUTO: 7.4 K/UL (ref 1.8–7.7)
NEUTROPHILS NFR BLD: 74.2 % (ref 38–73)
NRBC BLD-RTO: 0 /100 WBC
PHOSPHATE SERPL-MCNC: 1.8 MG/DL (ref 2.7–4.5)
PLATELET # BLD AUTO: 201 K/UL (ref 150–450)
PMV BLD AUTO: 11.7 FL (ref 9.2–12.9)
POCT GLUCOSE: 151 MG/DL (ref 70–110)
POCT GLUCOSE: 166 MG/DL (ref 70–110)
POCT GLUCOSE: 234 MG/DL (ref 70–110)
POCT GLUCOSE: 286 MG/DL (ref 70–110)
POTASSIUM SERPL-SCNC: 3.1 MMOL/L (ref 3.5–5.1)
RBC # BLD AUTO: 3.3 M/UL (ref 4–5.4)
SODIUM SERPL-SCNC: 136 MMOL/L (ref 136–145)
WBC # BLD AUTO: 9.99 K/UL (ref 3.9–12.7)

## 2022-11-02 PROCEDURE — 80048 BASIC METABOLIC PNL TOTAL CA: CPT | Performed by: STUDENT IN AN ORGANIZED HEALTH CARE EDUCATION/TRAINING PROGRAM

## 2022-11-02 PROCEDURE — 25000003 PHARM REV CODE 250

## 2022-11-02 PROCEDURE — 83735 ASSAY OF MAGNESIUM: CPT | Performed by: STUDENT IN AN ORGANIZED HEALTH CARE EDUCATION/TRAINING PROGRAM

## 2022-11-02 PROCEDURE — 25000003 PHARM REV CODE 250: Performed by: NURSE PRACTITIONER

## 2022-11-02 PROCEDURE — 99223 1ST HOSP IP/OBS HIGH 75: CPT | Mod: ,,, | Performed by: PAIN MEDICINE

## 2022-11-02 PROCEDURE — 94761 N-INVAS EAR/PLS OXIMETRY MLT: CPT

## 2022-11-02 PROCEDURE — 25000242 PHARM REV CODE 250 ALT 637 W/ HCPCS: Performed by: INTERNAL MEDICINE

## 2022-11-02 PROCEDURE — 86140 C-REACTIVE PROTEIN: CPT | Performed by: STUDENT IN AN ORGANIZED HEALTH CARE EDUCATION/TRAINING PROGRAM

## 2022-11-02 PROCEDURE — 99900035 HC TECH TIME PER 15 MIN (STAT)

## 2022-11-02 PROCEDURE — 84100 ASSAY OF PHOSPHORUS: CPT | Performed by: STUDENT IN AN ORGANIZED HEALTH CARE EDUCATION/TRAINING PROGRAM

## 2022-11-02 PROCEDURE — 36415 COLL VENOUS BLD VENIPUNCTURE: CPT | Performed by: STUDENT IN AN ORGANIZED HEALTH CARE EDUCATION/TRAINING PROGRAM

## 2022-11-02 PROCEDURE — 94640 AIRWAY INHALATION TREATMENT: CPT

## 2022-11-02 PROCEDURE — 99223 PR INITIAL HOSPITAL CARE,LEVL III: ICD-10-PCS | Mod: ,,, | Performed by: PAIN MEDICINE

## 2022-11-02 PROCEDURE — 85025 COMPLETE CBC W/AUTO DIFF WBC: CPT | Performed by: STUDENT IN AN ORGANIZED HEALTH CARE EDUCATION/TRAINING PROGRAM

## 2022-11-02 PROCEDURE — 27000221 HC OXYGEN, UP TO 24 HOURS

## 2022-11-02 PROCEDURE — 51702 INSERT TEMP BLADDER CATH: CPT

## 2022-11-02 PROCEDURE — 94668 MNPJ CHEST WALL SBSQ: CPT

## 2022-11-02 RX ORDER — GUAIFENESIN 100 MG/5ML
200 SOLUTION ORAL EVERY 4 HOURS
Qty: 1 ML | Refills: 0
Start: 2022-11-02 | End: 2022-11-12

## 2022-11-02 RX ORDER — POTASSIUM CHLORIDE 20 MEQ/1
40 TABLET, EXTENDED RELEASE ORAL ONCE
Status: COMPLETED | OUTPATIENT
Start: 2022-11-02 | End: 2022-11-02

## 2022-11-02 RX ORDER — TAMSULOSIN HYDROCHLORIDE 0.4 MG/1
0.8 CAPSULE ORAL DAILY
Qty: 1 CAPSULE | Refills: 0
Start: 2022-11-03 | End: 2022-12-09 | Stop reason: ALTCHOICE

## 2022-11-02 RX ORDER — FUROSEMIDE 20 MG/1
20 TABLET ORAL DAILY
Status: DISCONTINUED | OUTPATIENT
Start: 2022-11-02 | End: 2022-11-02 | Stop reason: HOSPADM

## 2022-11-02 RX ORDER — SODIUM CHLORIDE FOR INHALATION 3 %
4 VIAL, NEBULIZER (ML) INHALATION EVERY 6 HOURS
Qty: 1 ML | Refills: 0 | Status: ON HOLD
Start: 2022-11-02 | End: 2023-06-26 | Stop reason: HOSPADM

## 2022-11-02 RX ORDER — ENOXAPARIN SODIUM 100 MG/ML
30 INJECTION SUBCUTANEOUS DAILY
Qty: 1 EACH | Refills: 0
Start: 2022-11-02 | End: 2022-12-09 | Stop reason: ALTCHOICE

## 2022-11-02 RX ORDER — IPRATROPIUM BROMIDE AND ALBUTEROL SULFATE 2.5; .5 MG/3ML; MG/3ML
3 SOLUTION RESPIRATORY (INHALATION) EVERY 4 HOURS
Qty: 1 ML | Refills: 0
Start: 2022-11-02 | End: 2023-01-18

## 2022-11-02 RX ADMIN — INSULIN DETEMIR 2 UNITS: 100 INJECTION, SOLUTION SUBCUTANEOUS at 08:11

## 2022-11-02 RX ADMIN — POTASSIUM CHLORIDE 40 MEQ: 1500 TABLET, EXTENDED RELEASE ORAL at 10:11

## 2022-11-02 RX ADMIN — GUAIFENESIN 200 MG: 200 SOLUTION ORAL at 10:11

## 2022-11-02 RX ADMIN — FUROSEMIDE 20 MG: 20 TABLET ORAL at 10:11

## 2022-11-02 RX ADMIN — CARVEDILOL 3.12 MG: 3.12 TABLET, FILM COATED ORAL at 08:11

## 2022-11-02 RX ADMIN — IPRATROPIUM BROMIDE AND ALBUTEROL SULFATE 3 ML: 2.5; .5 SOLUTION RESPIRATORY (INHALATION) at 07:11

## 2022-11-02 RX ADMIN — SODIUM CHLORIDE SOLN NEBU 3% 4 ML: 3 NEBU SOLN at 12:11

## 2022-11-02 RX ADMIN — GUAIFENESIN 200 MG: 200 SOLUTION ORAL at 01:11

## 2022-11-02 RX ADMIN — IPRATROPIUM BROMIDE AND ALBUTEROL SULFATE 3 ML: 2.5; .5 SOLUTION RESPIRATORY (INHALATION) at 03:11

## 2022-11-02 RX ADMIN — TAMSULOSIN HYDROCHLORIDE 0.8 MG: 0.4 CAPSULE ORAL at 08:11

## 2022-11-02 RX ADMIN — FLUTICASONE FUROATE AND VILANTEROL TRIFENATATE 1 PUFF: 200; 25 POWDER RESPIRATORY (INHALATION) at 07:11

## 2022-11-02 RX ADMIN — TIOTROPIUM BROMIDE INHALATION SPRAY 2 PUFF: 3.12 SPRAY, METERED RESPIRATORY (INHALATION) at 07:11

## 2022-11-02 RX ADMIN — INSULIN ASPART 3 UNITS: 100 INJECTION, SOLUTION INTRAVENOUS; SUBCUTANEOUS at 11:11

## 2022-11-02 RX ADMIN — IPRATROPIUM BROMIDE AND ALBUTEROL SULFATE 3 ML: 2.5; .5 SOLUTION RESPIRATORY (INHALATION) at 12:11

## 2022-11-02 RX ADMIN — GUAIFENESIN 200 MG: 200 SOLUTION ORAL at 05:11

## 2022-11-02 RX ADMIN — SODIUM CHLORIDE SOLN NEBU 3% 4 ML: 3 NEBU SOLN at 07:11

## 2022-11-02 NOTE — PT/OT/SLP PROGRESS
Occupational Therapy      Patient Name:  Sussy Costa   MRN:  [id number]    Patient not seen today secondary to Other (Comment) (Pt with discharge orders in place to leave hospital). Will follow-up if pt remains hospitalized and appropriate for therapy.    11/2/2022

## 2022-11-02 NOTE — PLAN OF CARE
[facility]    FACILITY TRANSFER ORDERS      Patient Name: Sussy Costa  YOB: 1935    PCP: EZ Casillas MD   PCP Address: [address] [facility] [address] [place]  PCP Phone Number: [phone]  PCP Fax: [phone]    Encounter Date: 11/02/2022    Admit to: rehab    Vital Signs:  Routine    Diagnoses:   Active Hospital Problems    Diagnosis  POA    *Colonic mass [K63.89]  Yes     87 y.o. F dx w LBO due to colon mass s/p R colectomy post op day 3. Doing well post surgical standpoint, however postoperative course complicated by dyspnea with right middle lobe atelectasis.     1) awaiting BM  2) Bladder scans for urinary retention  3) Appreciate pulmonary input for respiratory disease  4) Continue pain control  5) NPO due to UGI sx and gas bubble seen on imaging  6) continue metoprolol if BP stable          Impaired mobility [Z74.09]  Unknown    Urinary retention [R33.9]  No    Hypophosphatemia [E83.39]  Unknown    Atelectasis [J98.11]  No     87-year-old lady with s/p abd surgery and a history of asthma and mucus plugging in the past who has worsening shortness of breath and chest x-ray consistent with atelectasis.      Asthma, moderate persistent [J45.40]  Unknown    Hyponatremia [E87.1]  Unknown      Resolved Hospital Problems   No resolved problems to display.       Allergies:  Review of patient's allergies indicates:   Allergen Reactions    Aspirin Other (See Comments)     Asthma    TRIAD OF NASAL POLYPS, ASA  ALLERGY AND ASTHMA.        Aspirin Other (See Comments)    Nsaids (non-steroidal anti-inflammatory drug) Other (See Comments)     AVOID DUE TO TRIAD OF ASA ALLERGY, NASAL POLYPS,AND ASTHMA    Penicillin      Other reaction(s): Rash    9/30/20: tolerates ceftriaxone       Diet: diabetic diet: 2000 calorie    Activities: Activity as tolerated and Up with assistance    Goals of Care Treatment Preferences:  Code Status: Full Code      Nursing: routine ca  care                  Accurate I&O                  Up in chair most of day      Labs:       CONSULTS:    Physical Therapy to evaluate and treat. , Occupational Therapy to evaluate and treat., and  to evaluate for community resources/long-range planning.    MISCELLANEOUS CARE:  Guevara Care: Empty Guevara bag every shift. Change Guevara every month. and Diabetes Care:   SN to perform and educate Diabetic management with blood glucose monitoring:, Fingerstick blood sugar AC and HS, and Report CBG < 60 or > 350 to physician.  Routine sliding scale  WOUND CARE ORDERS  Yes: Surgical Wound:  Location: abd  May shower, no tub bath, keep incision line clean and dry    Consult respiratory care  O2 per NC 2-5 liters to keep sats above 90  Chest PT every 4 hours  IPV every 4 hours    Medications: Review discharge medications with patient and family and provide education.      Current Discharge Medication List        START taking these medications    Details   albuterol-ipratropium (DUO-NEB) 2.5 mg-0.5 mg/3 mL nebulizer solution Take 3 mLs by nebulization every 4 (four) hours. Rescue  Qty: 1 mL, Refills: 0      enoxaparin (LOVENOX) 40 mg/0.4 mL Syrg Inject 0.3 mLs (30 mg total) into the skin once daily.  Qty: 1 each, Refills: 0      guaiFENesin 100 mg/5 ml (ROBITUSSIN) 100 mg/5 mL syrup Take 10 mLs (200 mg total) by mouth every 4 (four) hours. for 10 days  Qty: 1 mL, Refills: 0      sodium chloride 3% 3 % nebulizer solution Take 4 mLs by nebulization every 6 (six) hours.  Qty: 1 mL, Refills: 0      tamsulosin (FLOMAX) 0.4 mg Cap Take 2 capsules (0.8 mg total) by mouth once daily.  Qty: 1 capsule, Refills: 0      tiotropium bromide (SPIRIVA RESPIMAT) 2.5 mcg/actuation inhaler Inhale 2 puffs into the lungs once daily. Controller  Qty: 1 g, Refills: 0           CONTINUE these medications which have NOT CHANGED    Details   !! ACCU-CHEK GUIDE TEST STRIPS Strp TEST FOUR TIMES DAILY WITH MEALS AND NIGHTLY  Qty: 200 strip,  Refills: 11    Associated Diagnoses: Type 2 diabetes mellitus with microalbuminuria, without long-term current use of insulin      albuterol (PROAIR HFA) 90 mcg/actuation inhaler Inhale 2 puffs into the lungs every 6 (six) hours as needed for Wheezing. Rescue  Qty: 18 g, Refills: 3    Associated Diagnoses: Wheezing      albuterol (PROVENTIL) 2.5 mg /3 mL (0.083 %) nebulizer solution Take 3 mLs (2.5 mg total) by nebulization every 6 (six) hours as needed for Wheezing or Shortness of Breath. Rescue  Qty: 60 each, Refills: 11    Associated Diagnoses: Pneumonia of left lower lobe due to infectious organism; COPD exacerbation      atorvastatin (LIPITOR) 20 MG tablet Take 1 tablet (20 mg total) by mouth every evening.  Qty: 90 tablet, Refills: 0      !! blood sugar diagnostic Strp 1 strip by Misc.(Non-Drug; Combo Route) route 4 (four) times daily with meals and nightly.  Qty: 200 strip, Refills: 11    Associated Diagnoses: Type 2 diabetes mellitus with microalbuminuria, without long-term current use of insulin      calcium carbonate (OS-JUNIE) 600 mg calcium (1,500 mg) Tab Take 600 mg by mouth once.      fluticasone-salmeterol diskus inhaler 500-50 mcg Inhale 1 puff into the lungs 2 (two) times daily. Controller  Qty: 180 each, Refills: 3      furosemide (LASIX) 20 MG tablet Take 1 tablet (20 mg total) by mouth once daily.  Qty: 30 tablet, Refills: 0      insulin aspart U-100 (NOVOLOG FLEXPEN U-100 INSULIN) 100 unit/mL (3 mL) InPn pen Take 10 units with breakfast and lunch, and 12 units with dinner, plus correction scale, max TDD 40 units  Qty: 36 mL, Refills: 3      insulin detemir U-100 (LEVEMIR FLEXTOUCH U-100 INSULN) 100 unit/mL (3 mL) InPn pen Inject 3 Units into the skin 2 (two) times daily.  Qty: 15 mL, Refills: 11      irbesartan (AVAPRO) 300 MG tablet TAKE 1 TABLET BY MOUTH EVERY EVENING  Qty: 90 tablet, Refills: 3    Comments: This prescription was filled on 12/18/2021. Any refills authorized will be placed on  "file.      lancets (ACCU-CHEK FASTCLIX LANCET DRUM) [facility] [place] [place] CHECK BLOOD GLUCOSE FOUR TIMES DAILY  Qty: 200 each, Refills: 3    Comments: This prescription was filled on 2/14/2022. Any refills authorized will be placed on file.  Associated Diagnoses: Type 2 diabetes mellitus with microalbuminuria, without long-term current use of insulin      latanoprost 0.005 % ophthalmic solution Inject into the eye. 1 Drops Ophthalmic Every evening      levocetirizine (XYZAL) 5 MG tablet Take 1 tablet (5 mg total) by mouth every evening.  Qty: 90 tablet, Refills: 3      lipase-protease-amylase 24,000-76,000-120,000 units (CREON) 24,000-76,000 -120,000 unit capsule Take 2 capsules with meals orally and 1 capsule with snacks.  Qty: 270 capsule, Refills: 11    Associated Diagnoses: Chronic pancreatitis, unspecified pancreatitis type      magnesium oxide (MAG-OX) 400 mg (241.3 mg magnesium) tablet Take 400 mg by mouth once daily.      montelukast (SINGULAIR) 10 mg tablet TAKE 1 TABLET BY MOUTH EVERY EVENING  Qty: 30 tablet, Refills: 6    Comments: This prescription was filled on 3/24/2022. Any refills authorized will be placed on file.      neomycin-polymyxin-hydrocortisone (CORTISPORIN) 3.5-10,000-1 mg/mL-unit/mL-% otic suspension Use bid for nails  Qty: 10 mL, Refills: 6    Comments: This rx is used for nail diseases as well as otic applications  Associated Diagnoses: Onycholysis      olopatadine (PATANOL) 0.1 % ophthalmic solution Place 1 drop into both eyes 2 (two) times daily as needed. 1 Drops Ophthalmic Twice a day       pen needle, diabetic 31 gauge x 5/16" Ndle Inject 1 each into the skin 4 (four) times daily with meals and nightly. Use with insulin pen  Qty: 200 each, Refills: 11      vitamin D (VITAMIN D3) 1000 units Tab Take 1,000 Units by mouth once daily.      glucagon (GVOKE HYPOPEN 2-PACK) 1 mg/0.2 mL AtIn Inject 1 mg into the skin as needed (severe hypoglycemia).  Qty: 1 each, Refills: 1       !! - Potential duplicate " medications found. Please discuss with provider.        STOP taking these medications       carvediloL (COREG) 12.5 MG tablet Comments:   Reason for Stopping:         methylPREDNISolone (MEDROL DOSEPACK) 4 mg tablet Comments:   Reason for Stopping:                  Immunizations Administered as of 11/2/2022       Name Date Dose VIS Date Route Exp Date    COVID-19, MRNA, LN-S, PF (Moderna) 12/3/2021 0.25 mL -- -- --    Lot: [id number]     COVID-19, MRNA, LN-S, PF (Moderna) 2/25/2021 -- -- -- --    Lot: [id number]     COVID-19, MRNA, LN-S, PF (Moderna) 1/28/2021 -- -- -- --    Lot: [id number]             This patient has had both covid vaccinations    Some patients may experience side effects after vaccination.  These may include fever, headache, muscle or joint aches.  Most symptoms resolve with 24-48 hours and do not require urgent medical evaluation unless they persist for more than 72 hours or symptoms are concerning for an unrelated medical condition.        Voiding trial in few dasy, if unable to urinate due to urinary retention be sure she has an appt with urology to fu once discharged    Please titrate lasix and beta blockers per I&O and VS    FU Dr. Holman once discharged from rehab    _________________________________  Kayla Baker NP  11/02/2022

## 2022-11-02 NOTE — NURSING
Transfer services arrived but pt is on O2, wrong transfer services arranged. Will arrange [facility] ambulance services for transfer with O2.

## 2022-11-02 NOTE — PT/OT/SLP PROGRESS
Physical Therapy  Pt Not Seen    Patient Name:  Sussy Costa   MRN:  [id number]    Patient not seen today secondary to  Other (Comment) (2 attempts for tx session: 1. Pt found eating lunch at 11:43 am; 2. Transport present to take pt to SNF at 2:11 pm). Will follow-up on next scheduled visit if patient not discharged from hospital.    Olive Saba, PTA  11/2/2022

## 2022-11-02 NOTE — NURSING
[facility] services arrived to transfer pt to [facility]. Pt care handed over to [facility] personnel.

## 2022-11-02 NOTE — NURSING
AVS/discharge instructions reviewed and printed for pt. Pt verbalizes understanding of transfer to [facility]. Pt's vitals wnl, pain controlled, Pt is being transferred with ca cath due to urinary retention. Pt awaiting transfer services.

## 2022-11-02 NOTE — PHYSICIAN QUERY
PT Name: Sussy Costa  MR #: [id number]    DOCUMENTATION CLARIFICATION     CDS: Arnie COOL,RN        Contact information:[email or web address]     This form is a permanent document in the medical record.     Query Date: November 2, 2022    By submitting this query, we are merely seeking further clarification of documentation.. Please utilize your independent clinical judgment when addressing the question(s) below.    The medical record contains the following:   Indicators  Supporting Clinical Findings Location in Medical Record    Energy Intake     x Weight Loss Of note, she notes a 20 pound weight loss over the past year and 15 pound weight loss over the past 6 months.  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, malnutrition      Patient Active Problem List  Moderate protein-calorie malnutrition 11/1 Colon and Rectal PN/Samuel NP       10/25 Colon and Rectal Consults/ Luis           10/25  Anesthesia pre procedure evaluation    Social or Environmental Circumstances     x Treatment Advance to reg diet   11/1 Colon and Rectal Surgery PN    Other       Academy of Nutrition and Dietetics (Academy) and the American Society for Parenteral and Enteral Nutrition (A.S.P.E.N.) Clinical Characteristics to support Malnutrition   Malnutrition in the Context of Acute Illness or Injury Malnutrition in the Context of Chronic Illness or Injury Malnutrition in the  Context of Social or Environmental Circumstances   Malnutrition Level Moderate Severe Moderate Severe   Moderate   Severe   Energy Intake <75%                   >7 days <50%                 >5 days <75%           >1 month <75%                      >1 month   <75% for >3 months   <50% for >1 month   Weight Loss   1-2% in 1 week >2% in 1 week 5% in 1 month >5% in 1 month 5% in 1 month >5% in 1 month    5% in 1 month >5% in 1 month 7.5% in 3 months >7.5% in 3 months 7.5% in 3 months >7.5% in 3 months    7.5% in 3 months >7.5% in 3 months 10% in 6 months >10% in 6 months 10% in 6 months >10% in 6 months        20% in 1 year                    >20% in 1 year                                                                  20% in 1 year                            >20% in 1 year                                                  Subcutaneous Fat Loss Mild  Moderate  Mild  Severe    Mild   Severe   Muscle Loss Mild depletion Moderate depletion  Mild depletion Severe Depletion   Mild   Severe   Edema/Fluid Accumulation Mild Moderate to severe  Mild  Severe   Mild   Severe   Reduced  Strength         (based on standards supplied by  of dynamometer) N/A Measurably reduced N/A Measurably reduced N/A Measurably reduced     Criteria for mild malnutrition is defined as 1 characteristic outlined above within the established moderate or severe parameters.  A minimum of 2 out of the 6 characteristics noted above are recommended for a diagnosis of moderate or severe malnutrition.  Chronic illness/injury is a disease/condition lasting 3 months or longer.    The noted clinical guidelines are only system guidelines and do not replace the providers clinical judgment.    Provider, please clarify/confirm malnutrition diagnosis  associated with above clinical findings.    [ x ] Moderate Malnutrition - a minimum of 2 of the 6 moderate malnutrition characteristics noted above    [  ] Severe Malnutrition - a minimum of 2 of the 6  severe malnutrition characteristics noted above    [  ] Malnutrition, Unspecified degree   [  ] Other Nutritional Diagnosis (please specify): _______   [  ] Clinically Undetermined     Please document in your progress notes daily for the duration of treatment until resolved and  include in your discharge summary.      References:    LUTHER Douglass, & CEASAR Tao (2022, April). Assessment and management of anorexia and cachexia in palliative care. Retrieved May 23, 2022, from https://www.[employer]/contents/assessment-and-management-of-anorexia-and-cachexia-in-palliative-care?oocuvCkr=4460&source=see_link     KENDAL Bonilla, PhD, RD, Alyse VILLANUEVA P., PhD, RN, RICHARD Sandhu MD, PhD, Ramu CAMERON A., MS, RD, [facility], NETTE Lucas, MS, RD, The Academy Malnutrition Work Group, The A.S.P.E.N. Board of Directors. (2012). Consensus Statement: Academy of Nutrition and Dietetics and American Society for Parenteral and Enteral Nutrition: Characteristics Recommended for the Identification and Documentation of Adult Malnutrition (Undernutrition). Journal of Parenteral and Enteral Nutrition, 36(3), 275-283. doi:10.1177/5511073275402418     Form No. [id number]

## 2022-11-02 NOTE — DISCHARGE SUMMARY
[facility]  Colorectal Surgery  Discharge Summary      Patient Name: Sussy Costa  MRN: [id number]  Admission Date: 10/25/2022  Hospital Length of Stay: 8 days  Discharge Date and Time: 11/2/2022  Attending Physician: Jass Holman MD   Discharging Provider: Earlene Tyler NP  Primary Care Provider: EZ Casillas MD     HPI:  No notes on file    Procedure(s) (LRB):  COLECTOMY, RIGHT (Right)     Hospital Course:  Sussy Costa is a 87 y.o. female who presented with abdominal pain and distention for 1 day.  CT scan showed a 10cm ascending colon with decompressed colon distal to the hepatic flexure, suggestion of a mass at the hepatic flexure, and no small bowel dilation.  She was brought to surgery emergently 10/25.  Surgery vise she did well, abd inc healing well, eating and having stools but her respirtory status declinded.  Hx of ashtm and mucous plugging, resp treatments begun, aggressive pulmonary toileting, pulmonary consulted and placed her on antibiotics and more resp treaemtns incluidng chest pr and IPV.  She slowly improved, some hypoxia with ambulation with PT, discussed with pulmonary.  Consulted rehab due to her deconditioing and respiratory status.  She was accepted the rehave with transferred there 11/2, needs to fu with Dr. Nobles after she is discharged from rehab and poss urology for urinary retention requiring ca catheter.          Goals of Care Treatment Preferences:  Code Status: Full Code      Consults (From admission, onward)        Status Ordering Provider     Inpatient consult to Physical Medicine Rehab  Once        Provider:  (Not yet assigned)    Completed EARLENE TYLER     Inpatient consult to Pulmonology  Once        Provider:  (Not yet assigned)    Completed DELILAH ROBLES     Inpatient consult to Colorectal Surgery  Once        Provider:  (Not yet assigned)    Completed MAXINE TURNER          Significant Diagnostic Studies: Labs:   BMP:   Recent Labs    Lab 11/01/22  0330 11/02/22  0659   * 137*   * 136   K 3.6 3.1*   CL 99 101   CO2 28 29   BUN 11 12   CREATININE 0.6 0.6   CALCIUM 7.7* 7.5*   MG 1.3* 1.4*    and CBC   Recent Labs   Lab 11/01/22  0330 11/02/22  0659   WBC 14.10* 9.99   HGB 9.7* 9.5*   HCT 30.5* 29.9*    201       Pending Diagnostic Studies:     Procedure Component Value Units Date/Time    Hemoglobin A1c if not done in past 3 months [[id number]] Collected: 10/26/22 0617    Order Status: Sent Lab Status: In process Updated: 10/26/22 0617    Specimen: Blood     Specimen to Pathology, Surgery Gastrointestinal tract [[id number]] Collected: 10/25/22 1003    Order Status: Sent Lab Status: In process Updated: 10/25/22 1504    Specimen: Tissue         Final Active Diagnoses:    Diagnosis Date Noted POA    PRINCIPAL PROBLEM:  Colonic mass [K63.89] 10/28/2022 Yes    Impaired mobility [Z74.09] 11/01/2022 Unknown    Urinary retention [R33.9] 11/01/2022 No    Hypophosphatemia [E83.39] 10/27/2022 Unknown    Atelectasis [J98.11] 10/26/2022 No    Asthma, moderate persistent [J45.40] 10/26/2022 Unknown    Hyponatremia [E87.1] 10/26/2022 Unknown      Problems Resolved During this Admission:      Discharged Condition: good    Disposition: Rehab Facility    Follow Up:    Patient Instructions:   No discharge procedures on file.  Medications:  Reconciled Home Medications:      Medication List      START taking these medications    albuterol-ipratropium 2.5 mg-0.5 mg/3 mL nebulizer solution  Commonly known as: DUO-NEB  Take 3 mLs by nebulization every 4 (four) hours. Rescue     enoxaparin 40 mg/0.4 mL Syrg  Commonly known as: LOVENOX  Inject 0.3 mLs (30 mg total) into the skin once daily.     guaiFENesin 100 mg/5 ml 100 mg/5 mL syrup  Commonly known as: ROBITUSSIN  Take 10 mLs (200 mg total) by mouth every 4 (four) hours. for 10 days     sodium chloride 3% 3 % nebulizer solution  Take 4 mLs by nebulization every 6 (six) hours.     tamsulosin 0.4  mg Cap  Commonly known as: FLOMAX  Take 2 capsules (0.8 mg total) by mouth once daily.  Start taking on: November 3, 2022     tiotropium bromide 2.5 mcg/actuation inhaler  Commonly known as: SPIRIVA RESPIMAT  Inhale 2 puffs into the lungs once daily. Controller        CONTINUE taking these medications    * albuterol 90 mcg/actuation inhaler  Commonly known as: PROAIR HFA  Inhale 2 puffs into the lungs every 6 (six) hours as needed for Wheezing. Rescue     * albuterol 2.5 mg /3 mL (0.083 %) nebulizer solution  Commonly known as: PROVENTIL  Take 3 mLs (2.5 mg total) by nebulization every 6 (six) hours as needed for Wheezing or Shortness of Breath. Rescue     atorvastatin 20 MG tablet  Commonly known as: LIPITOR  Take 1 tablet (20 mg total) by mouth every evening.     * blood sugar diagnostic Strp  1 strip by Misc.(Non-Drug; Combo Route) route 4 (four) times daily with meals and nightly.     * ACCU-CHEK GUIDE TEST STRIPS Strp  Generic drug: blood sugar diagnostic  TEST FOUR TIMES DAILY WITH MEALS AND NIGHTLY     calcium carbonate 600 mg calcium (1,500 mg) Tab  Commonly known as: OS-JUNIE  Take 600 mg by mouth once.     CREON 24,000-76,000 -120,000 unit capsule  Generic drug: lipase-protease-amylase 24,000-76,000-120,000 units  Take 2 capsules with meals orally and 1 capsule with snacks.     fluticasone-salmeterol 500-50 mcg/dose 500-50 mcg/dose Dsdv diskus inhaler  Commonly known as: WIXELA INHUB  Inhale 1 puff into the lungs 2 (two) times daily. Controller     furosemide 20 MG tablet  Commonly known as: LASIX  Take 1 tablet (20 mg total) by mouth once daily.     GVOKE HYPOPEN 2-PACK 1 mg/0.2 mL Atin  Generic drug: glucagon  Inject 1 mg into the skin as needed (severe hypoglycemia).     insulin aspart U-100 100 unit/mL (3 mL) Inpn pen  Commonly known as: NovoLOG Flexpen U-100 Insulin  Take 10 units with breakfast and lunch, and 12 units with dinner, plus correction scale, max TDD 40 units     irbesartan 300 MG  "tablet  Commonly known as: AVAPRO  TAKE 1 TABLET BY MOUTH EVERY EVENING     lancets Misc  Commonly known as: ACCU-CHEK FASTCLIX LANCET DRUM  CHECK BLOOD GLUCOSE FOUR TIMES DAILY     latanoprost 0.005 % ophthalmic solution  Inject into the eye. 1 Drops Ophthalmic Every evening     LEVEMIR FLEXTOUCH U-100 INSULN 100 unit/mL (3 mL) Inpn pen  Generic drug: insulin detemir U-100  Inject 3 Units into the skin 2 (two) times daily.     levocetirizine 5 MG tablet  Commonly known as: XYZAL  Take 1 tablet (5 mg total) by mouth every evening.     magnesium oxide 400 mg (241.3 mg magnesium) tablet  Commonly known as: MAG-OX  Take 400 mg by mouth once daily.     montelukast 10 mg tablet  Commonly known as: SINGULAIR  TAKE 1 TABLET BY MOUTH EVERY EVENING     neomycin-polymyxin-hydrocortisone 3.5-10,000-1 mg/mL-unit/mL-% otic suspension  Commonly known as: CORTISPORIN  Use bid for nails     olopatadine 0.1 % ophthalmic solution  Commonly known as: PATANOL  Place 1 drop into both eyes 2 (two) times daily as needed. 1 Drops Ophthalmic Twice a day     pen needle, diabetic 31 gauge x 5/16" Ndle  Inject 1 each into the skin 4 (four) times daily with meals and nightly. Use with insulin pen     vitamin D 1000 units Tab  Commonly known as: VITAMIN D3  Take 1,000 Units by mouth once daily.         * This list has 4 medication(s) that are the same as other medications prescribed for you. Read the directions carefully, and ask your doctor or other care provider to review them with you.            STOP taking these medications    carvediloL 12.5 MG tablet  Commonly known as: COREG     methylPREDNISolone 4 mg tablet  Commonly known as: MEDROL RAYMOND Baker NP  Colorectal Surgery  [facility]    "

## 2022-11-02 NOTE — ASSESSMENT & PLAN NOTE
Developed urinary retention 10/30, ca replaced  Attempt another voiding trial, failed, ca replaced 11/2

## 2022-11-02 NOTE — NURSING
BG rechecked, elevated at 412 paged on call. Continue current orders, 5 units of insulin aspart and 2 of levemir. Recheck in one hour.     2233- BG now 332. MD ordered 2 extra units of insulin aspart.  Also pt reports last urinating around 1700 but urine was not measured (pt had liquid BM). This was first void post ca removal. Pt attempted to urinate but unable to void now. Bladder scan 436 ml. MD stated to give patient more time to void. If still unable to urinate after 2 hours, straight cath patient.     130- Paged on call. Pt has voided 300 ml. Bladder scan after voiding showed 324 ml. Order to straight cath now.

## 2022-11-02 NOTE — CARE UPDATE
11/01/22 1929   Patient Assessment/Suction   Level of Consciousness (AVPU) alert   Respiratory Effort Unlabored   All Lung Fields Breath Sounds Anterior:;bronchial   Rhythm/Pattern, Respiratory no shortness of breath reported   Cough Frequency infrequent   Cough Type congested;nonproductive   Sent to the lab? No   Skin Integrity   $ Wound Care Tech Time 15 min   Area Observed Left;Right;Behind ear   Skin Appearance without discoloration   Barrier used?   (None)   Barrier Changed? No   PRE-TX-O2   O2 Device (Oxygen Therapy) nasal cannula   $ Is the patient on Low Flow Oxygen? Yes   Flow (L/min) 5   SpO2 97 %   Pulse Oximetry Type Intermittent   $ Pulse Oximetry - Multiple Charge Pulse Oximetry - Multiple   Pulse 101   Resp 20   Positioning HOB elevated 30 degrees   Aerosol Therapy   $ Aerosol Therapy Charges Aerosol Treatment  (both medication given together/IPV)   Respiratory Treatment Status (SVN) given   Treatment Route (SVN) mouthpiece   Patient Position (SVN) HOB elevated   Post Treatment Assessment (SVN) breath sounds unchanged   Signs of Intolerance (SVN) none   Breath Sounds Post-Respiratory Treatment   Post-treatment Heart Rate (beats/min) 99   Post-treatment Resp Rate (breaths/min) 21   Chest Physiotherapy   $ Chest Physiotherapy Charges Subsequent   Type (CPT) percussion   Method (CPT) by hand   Patient Position (CPT) HOB elevated   Lung Fields (CPT) Anterior:;CHASTITY (left upper lobe);RUL (right upper lobe)   CPT Total Time (Min) 10   Signs of Intolerance (CPT) none   Intrapulmonary Percussive Ventilation/Metaneb   $ IPV Administration Therapy   Route (IPV) mouthpiece   IPV Working Pressure (cm H2O) 15   IPV Duration Total (min) 15   Patient Position (IPV) HOB elevated   Post Treatment Assessment (IPV) breath sounds unchanged   Signs of Intolerance (IPV) none   General Safety Checklist   Safety Promotion/Fall Prevention side rails raised   Airway Safety   Ambu bag with the patient? Yes, Adult Ambu   Is  mask with the patient? Yes, Adult Mask   Suction set is at the bedside? Yes

## 2022-11-02 NOTE — PLAN OF CARE
[facility]  Discharge Final Note    Primary Care Provider: EZ Casillas MD    Expected Discharge Date: 11/3/2022    Discharge to [facility]  Transportation changed to ambulance due to postural instability and 5L of O2.     Nurse to call report to [phone].    Final Discharge Note (most recent)       Final Note - 11/02/22 1150          Final Note    Assessment Type Final Discharge Note     Anticipated Discharge Disposition Rehab Facility     What phone number can be called within the next 1-3 days to see how you are doing after discharge? [phone]     Hospital Resources/Appts/Education Provided Post-Acute resouces added to AVS        Post-Acute Status    Post-Acute Authorization Placement     Post-Acute Placement Status Set-up Complete/Auth obtained     Coverage Blue [employer]     Patient choice form signed by patient/caregiver List with quality metrics by geographic area provided     Discharge Delays None known at this time                     Important Message from Medicare      TRAVON Oscar, RN    EXT [id number]

## 2022-11-02 NOTE — HOSPITAL COURSE
Sussy Costa is a 87 y.o. female who presented with abdominal pain and distention for 1 day.  CT scan showed a 10cm ascending colon with decompressed colon distal to the hepatic flexure, suggestion of a mass at the hepatic flexure, and no small bowel dilation.  She was brought to surgery emergently 10/25.  Surgery vise she did well, abd inc healing well, eating and having stools but her respirtory status declinded.  Hx of ashtm and mucous plugging, resp treatments begun, aggressive pulmonary toileting, pulmonary consulted and placed her on antibiotics and more resp treaemtns incluidng chest pr and IPV.  She slowly improved, some hypoxia with ambulation with PT, discussed with pulmonary.  Consulted rehab due to her deconditioing and respiratory status.  She was accepted the rehave with transferred there 11/2, needs to fu with Dr. Nobles after she is discharged from rehab and poss urology for urinary retention requiring ca catheter.

## 2022-11-02 NOTE — PLAN OF CARE
AAOx4, VSS. 5L NC. Midline ab incision with DB. Denies pain. Bm overnight. Urinary retention overnight, straight cath at 2 am performed. Due to void at 8am. Up with standby assistance. Accuchecks q 6, hyperglycemic. See previous notes. Pt continued to be Afib on tele, HR 80s- low 100s. Intermittently patient in NS but then rhythm noted to be irregular again.

## 2022-11-03 ENCOUNTER — HOSPITAL ENCOUNTER (OUTPATIENT)
Dept: RADIOLOGY | Facility: HOSPITAL | Age: 87
Discharge: HOME | End: 2022-11-03
Attending: PAIN MEDICINE
Payer: MEDICARE

## 2022-11-03 PROCEDURE — 71045 XR CHEST 1 VIEW: ICD-10-PCS | Mod: 26,,, | Performed by: RADIOLOGY

## 2022-11-03 PROCEDURE — 71045 X-RAY EXAM CHEST 1 VIEW: CPT | Mod: 26,,, | Performed by: RADIOLOGY

## 2022-11-03 NOTE — PHYSICIAN QUERY
PT Name: Sussy Costa  MR #: [id number]    DOCUMENTATION CLARIFICATION     CDS: Arnie COOL,RN        Contact information:[email or web address]  This form is a permanent document in the medical record.     Query Date: November 3, 2022    By submitting this query, we are merely seeking further clarification of documentation.  Please utilize your independent clinical judgment when addressing the question(s) below.    The medical record contains the following:  Pathology Findings Location in Medical Record   Small intestine, appendix, right colon (right colectomy):   - Positive for malignancy   - Adenocarcinoma, see synoptic report below   ___________________________________________________  Colon cancer synoptic report   - Procedure: Right colectomy   - Tumor site: Ascending colon   - Histologic type: Adenocarcinoma   - Histologic grade: Moderately differentiated, grade 2   - Tumor size:  6.5 cm   - Multiple primary sites: Not applicable   - Tumor extent: Invades visceral peritoneum (including tumor continuous with   serosal surface through area of inflammation)   - Macroscopic tumor perforation: Not identified   - Lymphovascular invasion: Small vessel: Present   - Perineural invasion: Present   - Tumor bud score: High   - Treatment effect: No known presurgical therapy   - Margins:   - All margins negative for carcinoma   - Closest margin to invasive carcinoma: Radial (circumferential)   - Distance: 2 mm   - All margins negative for high grade dysplasia   - Regional lymph nodes:   - Regional lymph nodes present   - All regional lymph nodes negative for tumor   - Number of lymph nodes examined:  18   - Tumor deposits:  Not identified   - Pathologic staging:  pT4a  N0 MX   Additional findings:   -Tubular adenoma   -Sessile serrated polyp/lesion, negative for cytologic dysplasia x2   ____________________________________________________  Immunohistochemistry (IHC) Testing for Mismatch Repair (MMR) Proteins:    MLH1 - Intact nuclear expression   MSH2 - Intact nuclear expression   MSH6 - Intact nuclear expression   PMS2 - Intact nuclear expression   Background nonneoplastic tissue/internal control with intact nuclear expression   IHC Interpretation   No loss of nuclear expression of MMR proteins: low probability of microsatellite instability   There are exceptions to the above IHC interpretations. These results should not be considered in isolation, and clinical correlation with genetic counseling is recommended to assess the need for germline testing. All immunostains with appropriate positive and negative controls      DATE OF OPERATION: 10/25/2022   PREOPERATIVE DIAGNOSIS: Large bowel obstruction   POSTOPERATIVE DIAGNOSIS: Large bowel obstruction   PROCEDURE PERFORMED: Right Colectomy    ATTENDING SURGEON: Jass Holman MD   RESIDENT/FELLOW SURGEON: Jim Smith MD  FINDINGS:  Hepatic flexure mass with dilated ascending colon.                                     10/25 Final Pathologic Diagnosis Report                                                                                                   10/25  Colon and Rectal Op Note               Please clarify: clinical findings  [  x] Pathology findings noted above are ruled in/confirmed as diagnoses   [  ] Pathology findings noted above are not confirmed as diagnoses   [  ] Pathology findings noted above are incidental   [  ] Other diagnosis (please specify): ___________   [  ] Clinically Undetermined       Please document in your progress notes daily for the duration of treatment until resolved and include in your discharge summary.    Form No. [id number]

## 2022-11-16 ENCOUNTER — TELEPHONE (OUTPATIENT)
Dept: INTERNAL MEDICINE | Facility: CLINIC | Age: 87
End: 2022-11-16
Payer: MEDICARE

## 2022-11-16 ENCOUNTER — TELEPHONE (OUTPATIENT)
Dept: ENDOCRINOLOGY | Facility: CLINIC | Age: 87
End: 2022-11-16
Payer: MEDICARE

## 2022-11-16 NOTE — TELEPHONE ENCOUNTER
----- Message from Ebony Flores sent at 11/16/2022  1:47 PM CST -----  Contact: pt [phone]  Pt is calling in regards to medications that she was discharged with from the hospital. Per pt, she would like to go over them and would also like to know if she keeps taking her other medication. Pt would like a call back on today.          Thank you

## 2022-11-16 NOTE — TELEPHONE ENCOUNTER
pt is calling in regards to medications that she was discharged with from the hospital. Per pt, she would like to go over them and would also like to know if she keeps taking her other medication. Pt is not sure of what medication she should and shouldn't be taking . Asked if any paperwork was given to her she says yes but she doesn't know if she should continue old meds are not

## 2022-11-16 NOTE — TELEPHONE ENCOUNTER
----- Message from Swetha Vivar sent at 11/16/2022  1:23 PM CST -----  Regarding: speak with nurse  Contact: patient  [phone]   please call patient ASAP have questions for nurse eric concerning her insulin the one at home is different than when she was in the hospital is this okay waiting on a call back soon thanks.

## 2022-11-16 NOTE — TELEPHONE ENCOUNTER
----- Message from Kassandra Johnson sent at 11/16/2022  1:04 PM CST -----  Contact: pt [phone]  Patient states that she was released today from [facility] and needs to verify her meds.    Please call and advise.    Thank You

## 2022-11-16 NOTE — TELEPHONE ENCOUNTER
----- Message from Kassandra Johnson sent at 11/16/2022  1:04 PM CST -----  Contact: pt [phone]  Patient states that she was released today from [facility] and needs to verify her meds.    Please call and advise.    Thank You

## 2022-11-16 NOTE — TELEPHONE ENCOUNTER
Spoke with pt about new medication orders she received after being discharged from Rehab after hospital stay. Pt states she did not want to fill new medicine not knowing about it. I spoke with Dr. Martines about what pt reported. After reviewing LOV note. Dr. Martines advised pt to continue on medication he ordered at LOV and send in Glucose logs after 1 week. I scheduled pt with F/u for Jan 2023. I informed pt of Dr. Martines orders, Pt verbalized understanding.

## 2022-11-17 ENCOUNTER — OFFICE VISIT (OUTPATIENT)
Dept: UROLOGY | Facility: CLINIC | Age: 87
End: 2022-11-17
Payer: MEDICARE

## 2022-11-17 VITALS
DIASTOLIC BLOOD PRESSURE: 70 MMHG | HEIGHT: 65 IN | SYSTOLIC BLOOD PRESSURE: 114 MMHG | HEART RATE: 110 BPM | WEIGHT: 103.63 LBS | BODY MASS INDEX: 17.27 KG/M2

## 2022-11-17 DIAGNOSIS — R33.9 URINARY RETENTION: Primary | ICD-10-CM

## 2022-11-17 PROCEDURE — 99213 OFFICE O/P EST LOW 20 MIN: CPT | Mod: S$PBB,ICN,, | Performed by: NURSE PRACTITIONER

## 2022-11-17 PROCEDURE — 99213 PR OFFICE/OUTPT VISIT, EST, LEVL III, 20-29 MIN: ICD-10-PCS | Mod: S$PBB,ICN,, | Performed by: NURSE PRACTITIONER

## 2022-11-17 PROCEDURE — G0180 MD CERTIFICATION HHA PATIENT: HCPCS | Mod: ,,, | Performed by: INTERNAL MEDICINE

## 2022-11-17 PROCEDURE — G0180 PR HOME HEALTH MD CERTIFICATION: ICD-10-PCS | Mod: ,,, | Performed by: INTERNAL MEDICINE

## 2022-11-17 PROCEDURE — 99999 PR PBB SHADOW E&M-EST. PATIENT-LVL IV: ICD-10-PCS | Mod: PBBFAC,,, | Performed by: NURSE PRACTITIONER

## 2022-11-17 PROCEDURE — 99999 PR PBB SHADOW E&M-EST. PATIENT-LVL IV: CPT | Mod: PBBFAC,,, | Performed by: NURSE PRACTITIONER

## 2022-11-17 PROCEDURE — 99214 OFFICE O/P EST MOD 30 MIN: CPT | Mod: PBBFAC | Performed by: NURSE PRACTITIONER

## 2022-11-17 NOTE — TELEPHONE ENCOUNTER
Pt will call someone from cardiac rehad , requesting an appt in 2 weeks , appt has been scheduled with pt

## 2022-11-17 NOTE — PROGRESS NOTES
CHIEF COMPLAINT:    Mrs. Costa is a 87 y.o. female presenting for urinary retention.    .  PRESENTING ILLNESS:    Sussy Costa is a 87 y.o. female with a PMH of stenosis, hx pneumonia, htn, hld, diabetes mellitus, colonic mass who presents for urinary retention .     New patient to urology department.  Presented to ED 10/25/22 with abdominal pain and distention for one day.  CT scan showed a 10cm ascending colon with decompressed colon distal to the hepatic flexure, suggestive of a mass at the hepatic flexure, and no small bowel dilation.  She was brought to surgery emergently 10/25 for right colectomy.  Surgery wise she did well, abd inc healing well, eating and having stools but her respirtory status declinded.  She slowly improved, some hypoxia with ambulation with PT, discussed with pulmonary.  Consulted rehab due to her deconditioing and respiratory status.  Had urinary retention post operatively with ca in place.  Had voiding trial inpatient 11/2 and failed Presents today for voiding trial.  Prior to this episode had no urinary problems     REVIEW OF SYSTEMS:    Review of Systems   Constitutional:  Negative for chills and fever.   Respiratory:  Negative for shortness of breath.    Cardiovascular:  Negative for chest pain.   Gastrointestinal:  Negative for constipation and diarrhea.   Genitourinary:  Negative for dysuria, flank pain, frequency, hematuria and urgency.   Neurological:  Negative for dizziness and weakness.      PATIENT HISTORY:    Past Medical History:   Diagnosis Date    Asthma     Cyst of pancreas     liver    Diabetes mellitus type II     Eczema of hand     Glaucoma     Hyperlipidemia     Hypertension     Lichen planus     gums    Osteoporosis     Pulmonary nodules        Family History   Problem Relation Age of Onset    Heart disease Father     Heart disease Brother     Hypertension Brother        Allergies:  Aspirin, Aspirin, Nsaids (non-steroidal anti-inflammatory drug), and  Penicillin    Medications:    Current Outpatient Medications:     ACCU-CHEK GUIDE TEST STRIPS Strp, TEST FOUR TIMES DAILY WITH MEALS AND NIGHTLY, Disp: 200 strip, Rfl: 11    albuterol (PROAIR HFA) 90 mcg/actuation inhaler, Inhale 2 puffs into the lungs every 6 (six) hours as needed for Wheezing. Rescue, Disp: 18 g, Rfl: 3    albuterol (PROVENTIL) 2.5 mg /3 mL (0.083 %) nebulizer solution, Take 3 mLs (2.5 mg total) by nebulization every 6 (six) hours as needed for Wheezing or Shortness of Breath. Rescue, Disp: 60 each, Rfl: 11    albuterol-ipratropium (DUO-NEB) 2.5 mg-0.5 mg/3 mL nebulizer solution, Take 3 mLs by nebulization every 4 (four) hours. Rescue, Disp: 1 mL, Rfl: 0    atorvastatin (LIPITOR) 20 MG tablet, Take 1 tablet (20 mg total) by mouth every evening., Disp: 90 tablet, Rfl: 0    blood sugar diagnostic Strp, 1 strip by Misc.(Non-Drug; Combo Route) route 4 (four) times daily with meals and nightly., Disp: 200 strip, Rfl: 11    calcium carbonate (OS-JUNIE) 600 mg calcium (1,500 mg) Tab, Take 600 mg by mouth once., Disp: , Rfl:     enoxaparin (LOVENOX) 40 mg/0.4 mL Syrg, Inject 0.3 mLs (30 mg total) into the skin once daily., Disp: 1 each, Rfl: 0    fluticasone-salmeterol diskus inhaler 500-50 mcg, Inhale 1 puff into the lungs 2 (two) times daily. Controller, Disp: 180 each, Rfl: 3    furosemide (LASIX) 20 MG tablet, Take 1 tablet (20 mg total) by mouth once daily., Disp: 30 tablet, Rfl: 0    glucagon (GVOKE HYPOPEN 2-PACK) 1 mg/0.2 mL AtIn, Inject 1 mg into the skin as needed (severe hypoglycemia)., Disp: 1 each, Rfl: 1    insulin aspart U-100 (NOVOLOG FLEXPEN U-100 INSULIN) 100 unit/mL (3 mL) InPn pen, Take 10 units with breakfast and lunch, and 12 units with dinner, plus correction scale, max TDD 40 units, Disp: 36 mL, Rfl: 3    insulin detemir U-100 (LEVEMIR FLEXTOUCH U-100 INSULN) 100 unit/mL (3 mL) InPn pen, Inject 3 Units into the skin 2 (two) times daily., Disp: 15 mL, Rfl: 11    irbesartan (AVAPRO)  "300 MG tablet, TAKE 1 TABLET BY MOUTH EVERY EVENING, Disp: 90 tablet, Rfl: 3    lancets (ACCU-CHEK FASTCLIX LANCET DRUM) Mis, CHECK BLOOD GLUCOSE FOUR TIMES DAILY, Disp: 200 each, Rfl: 3    latanoprost 0.005 % ophthalmic solution, Inject into the eye. 1 Drops Ophthalmic Every evening, Disp: , Rfl:     levocetirizine (XYZAL) 5 MG tablet, Take 1 tablet (5 mg total) by mouth every evening., Disp: 90 tablet, Rfl: 3    lipase-protease-amylase 24,000-76,000-120,000 units (CREON) 24,000-76,000 -120,000 unit capsule, Take 2 capsules with meals orally and 1 capsule with snacks., Disp: 270 capsule, Rfl: 11    magnesium oxide (MAG-OX) 400 mg (241.3 mg magnesium) tablet, Take 400 mg by mouth once daily., Disp: , Rfl:     montelukast (SINGULAIR) 10 mg tablet, TAKE 1 TABLET BY MOUTH EVERY EVENING, Disp: 30 tablet, Rfl: 6    neomycin-polymyxin-hydrocortisone (CORTISPORIN) 3.5-10,000-1 mg/mL-unit/mL-% otic suspension, Use bid for nails, Disp: 10 mL, Rfl: 6    olopatadine (PATANOL) 0.1 % ophthalmic solution, Place 1 drop into both eyes 2 (two) times daily as needed. 1 Drops Ophthalmic Twice a day , Disp: , Rfl:     pen needle, diabetic 31 gauge x 5/16" Ndle, Inject 1 each into the skin 4 (four) times daily with meals and nightly. Use with insulin pen, Disp: 200 each, Rfl: 11    sodium chloride 3% 3 % nebulizer solution, Take 4 mLs by nebulization every 6 (six) hours., Disp: 1 mL, Rfl: 0    tamsulosin (FLOMAX) 0.4 mg Cap, Take 2 capsules (0.8 mg total) by mouth once daily., Disp: 1 capsule, Rfl: 0    tiotropium bromide (SPIRIVA RESPIMAT) 2.5 mcg/actuation inhaler, Inhale 2 puffs into the lungs once daily. Controller, Disp: 1 g, Rfl: 0    vitamin D (VITAMIN D3) 1000 units Tab, Take 1,000 Units by mouth once daily., Disp: , Rfl:   No current facility-administered medications for this visit.    PHYSICAL EXAMINATION:      Physical Exam  Vitals and nursing note reviewed.   Constitutional:       Appearance: Normal appearance. She is " well-developed.   HENT:      Head: Normocephalic and atraumatic.   Eyes:      Pupils: Pupils are equal, round, and reactive to light.   Pulmonary:      Effort: Pulmonary effort is normal.   Genitourinary:     Comments: Ca to gravity  Musculoskeletal:         General: Normal range of motion.      Cervical back: Normal range of motion.   Skin:     General: Skin is warm and dry.   Neurological:      Mental Status: She is alert and oriented to person, place, and time.   Psychiatric:         Behavior: Behavior normal.         LABS:      IMPRESSION:    No diagnosis found.    PLAN:    Problem List Items Addressed This Visit    None      1. Urinary retention   - Voiding trial performed by Nurse dahl.  200 ml of sterile water was instilled into bladder.  Ca catheter was removed. Patient urinated without difficulty.     Patient was instructed to drink plenty of fluids today.  Instructed patient to call to give an update on urine output.  I instructed patient to return to emergency department (if after clinic hours) to have ca catheter put back in if unable to urinate within 5 hours of ca catheter removal or starts to experience bladder pressure/pain, decrease flow, straining/difficulty urinating, urinary frequency.    Patient voiced understanding.     2. RTC prn      Aniyah Brown NP        I spent 30 minutes with the patient. Over 50% of the visit was spent in counseling.

## 2022-11-18 ENCOUNTER — TELEPHONE (OUTPATIENT)
Dept: SURGERY | Facility: CLINIC | Age: 87
End: 2022-11-18
Payer: MEDICARE

## 2022-11-18 NOTE — TELEPHONE ENCOUNTER
Spoke with patient nadn post-operative appointment scheduled. Details confirmed verbally over phone.

## 2022-11-18 NOTE — TELEPHONE ENCOUNTER
----- Message from Claudia Zhang LPN sent at 11/18/2022 10:59 AM CST -----  Contact: Pt  Please take this message.   Thanks  ----- Message -----  From: Giselle Talamantes  Sent: 11/18/2022  10:14 AM CST  To: [facility]    Pt is requesting a callback from staff in regards to scheduling a follow up appt. From surg on 10/25. Pt stated she was rehab and could not scheduled after the two weeks.     Confirmed contact below:   Contact Name:Sussy Costa  Phone Number: [phone]

## 2022-11-22 ENCOUNTER — TELEPHONE (OUTPATIENT)
Dept: INTERNAL MEDICINE | Facility: CLINIC | Age: 87
End: 2022-11-22
Payer: MEDICARE

## 2022-11-22 RX ORDER — DOXYCYCLINE HYCLATE 100 MG
100 TABLET ORAL 2 TIMES DAILY
Qty: 20 TABLET | Refills: 0 | Status: SHIPPED | OUTPATIENT
Start: 2022-11-22 | End: 2022-12-09 | Stop reason: ALTCHOICE

## 2022-11-22 NOTE — TELEPHONE ENCOUNTER
----- Message from Aniyah Lind sent at 11/22/2022  1:24 PM CST -----  Contact: [phone]  [email or web address]     Patient is calling for Medical Advice regarding:both legs red and swollen, itchy    How long has patient had these symptoms:started last week    Pharmacy name and phone#:      [facility] - [place], [place] - [address]  [address] [address]  Phone: [phone] Fax: [phone]      Would like response via [employer]:  ##    Comments:

## 2022-11-22 NOTE — TELEPHONE ENCOUNTER
Pt home nurse called f in regards to patient both legs hot to touch , red and painful.pt declines an office visit to be evaluated ,says she just got out of the hospital, says legs and feet hurt . Nurse gave pt lasix last night at 5 p.m. and is asking for an antibiotic for the leg

## 2022-11-22 NOTE — TELEPHONE ENCOUNTER
Pt schedule 11/30 with resident . Informed that meds have been sent a dn to call if symptoms worsen to be scheduled sooner

## 2022-11-22 NOTE — TELEPHONE ENCOUNTER
----- Message from Bridget Fernandes sent at 11/22/2022  8:50 AM CST -----  Contact: [place] [phone]  Pt home nurse requesting a call from the office in regards to patient both legs hot to touch , red and painful. Please call and advise, Thanks

## 2022-11-30 ENCOUNTER — LAB VISIT (OUTPATIENT)
Dept: LAB | Facility: HOSPITAL | Age: 87
End: 2022-11-30
Payer: MEDICARE

## 2022-11-30 ENCOUNTER — OFFICE VISIT (OUTPATIENT)
Dept: INTERNAL MEDICINE | Facility: CLINIC | Age: 87
End: 2022-11-30
Payer: MEDICARE

## 2022-11-30 VITALS
BODY MASS INDEX: 17.58 KG/M2 | DIASTOLIC BLOOD PRESSURE: 70 MMHG | TEMPERATURE: 98 F | HEIGHT: 65 IN | OXYGEN SATURATION: 95 % | SYSTOLIC BLOOD PRESSURE: 120 MMHG | WEIGHT: 105.5 LBS | HEART RATE: 105 BPM

## 2022-11-30 DIAGNOSIS — R60.0 LOWER EXTREMITY EDEMA: ICD-10-CM

## 2022-11-30 DIAGNOSIS — Z09 HOSPITAL DISCHARGE FOLLOW-UP: ICD-10-CM

## 2022-11-30 DIAGNOSIS — Z09 HOSPITAL DISCHARGE FOLLOW-UP: Primary | ICD-10-CM

## 2022-11-30 DIAGNOSIS — R06.02 SOB (SHORTNESS OF BREATH) ON EXERTION: ICD-10-CM

## 2022-11-30 DIAGNOSIS — K56.609 LARGE BOWEL OBSTRUCTION: ICD-10-CM

## 2022-11-30 DIAGNOSIS — E11.59 HYPERTENSION ASSOCIATED WITH DIABETES: ICD-10-CM

## 2022-11-30 DIAGNOSIS — R80.9 TYPE 2 DIABETES MELLITUS WITH MICROALBUMINURIA, WITHOUT LONG-TERM CURRENT USE OF INSULIN: ICD-10-CM

## 2022-11-30 DIAGNOSIS — E11.29 TYPE 2 DIABETES MELLITUS WITH MICROALBUMINURIA, WITHOUT LONG-TERM CURRENT USE OF INSULIN: ICD-10-CM

## 2022-11-30 DIAGNOSIS — I15.2 HYPERTENSION ASSOCIATED WITH DIABETES: ICD-10-CM

## 2022-11-30 DIAGNOSIS — R63.4 WEIGHT LOSS: ICD-10-CM

## 2022-11-30 PROCEDURE — 36415 COLL VENOUS BLD VENIPUNCTURE: CPT | Mod: PO | Performed by: STUDENT IN AN ORGANIZED HEALTH CARE EDUCATION/TRAINING PROGRAM

## 2022-11-30 PROCEDURE — 99215 OFFICE O/P EST HI 40 MIN: CPT | Mod: PBBFAC,PO | Performed by: STUDENT IN AN ORGANIZED HEALTH CARE EDUCATION/TRAINING PROGRAM

## 2022-11-30 PROCEDURE — 99999 PR PBB SHADOW E&M-EST. PATIENT-LVL V: ICD-10-PCS | Mod: PBBFAC,GC,, | Performed by: STUDENT IN AN ORGANIZED HEALTH CARE EDUCATION/TRAINING PROGRAM

## 2022-11-30 PROCEDURE — 83880 ASSAY OF NATRIURETIC PEPTIDE: CPT | Performed by: STUDENT IN AN ORGANIZED HEALTH CARE EDUCATION/TRAINING PROGRAM

## 2022-11-30 PROCEDURE — 83036 HEMOGLOBIN GLYCOSYLATED A1C: CPT | Performed by: STUDENT IN AN ORGANIZED HEALTH CARE EDUCATION/TRAINING PROGRAM

## 2022-11-30 PROCEDURE — 99999 PR PBB SHADOW E&M-EST. PATIENT-LVL V: CPT | Mod: PBBFAC,GC,, | Performed by: STUDENT IN AN ORGANIZED HEALTH CARE EDUCATION/TRAINING PROGRAM

## 2022-11-30 PROCEDURE — 99214 OFFICE O/P EST MOD 30 MIN: CPT | Mod: S$PBB,GC,, | Performed by: STUDENT IN AN ORGANIZED HEALTH CARE EDUCATION/TRAINING PROGRAM

## 2022-11-30 PROCEDURE — 80053 COMPREHEN METABOLIC PANEL: CPT | Performed by: STUDENT IN AN ORGANIZED HEALTH CARE EDUCATION/TRAINING PROGRAM

## 2022-11-30 PROCEDURE — 99214 PR OFFICE/OUTPT VISIT, EST, LEVL IV, 30-39 MIN: ICD-10-PCS | Mod: S$PBB,GC,, | Performed by: STUDENT IN AN ORGANIZED HEALTH CARE EDUCATION/TRAINING PROGRAM

## 2022-11-30 PROCEDURE — 85025 COMPLETE CBC W/AUTO DIFF WBC: CPT | Performed by: STUDENT IN AN ORGANIZED HEALTH CARE EDUCATION/TRAINING PROGRAM

## 2022-11-30 RX ORDER — BUDESONIDE 1 MG/2ML
INHALANT ORAL
Status: ON HOLD | COMMUNITY
Start: 2022-08-09 | End: 2024-04-02 | Stop reason: HOSPADM

## 2022-11-30 RX ORDER — INSULIN GLARGINE 100 [IU]/ML
3 INJECTION, SOLUTION SUBCUTANEOUS
COMMUNITY
Start: 2022-11-15 | End: 2022-12-09 | Stop reason: SDUPTHER

## 2022-11-30 RX ORDER — CALCIUM CARBONATE 200(500)MG
1500 TABLET,CHEWABLE ORAL
COMMUNITY
Start: 2022-11-15 | End: 2022-12-09 | Stop reason: ALTCHOICE

## 2022-11-30 RX ORDER — INSULIN GLARGINE 100 [IU]/ML
INJECTION, SOLUTION SUBCUTANEOUS
COMMUNITY
Start: 2022-11-16 | End: 2022-12-09 | Stop reason: SDUPTHER

## 2022-11-30 RX ORDER — LORATADINE 10 MG/1
10 TABLET ORAL NIGHTLY
COMMUNITY
Start: 2022-11-15 | End: 2022-12-09 | Stop reason: ALTCHOICE

## 2022-11-30 RX ORDER — INSULIN LISPRO 100 [IU]/ML
INJECTION, SOLUTION INTRAVENOUS; SUBCUTANEOUS
COMMUNITY
Start: 2022-11-16 | End: 2022-12-08

## 2022-11-30 RX ORDER — INSULIN LISPRO 100 [IU]/ML
8 INJECTION, SOLUTION INTRAVENOUS; SUBCUTANEOUS
COMMUNITY
Start: 2022-11-15 | End: 2022-12-08

## 2022-11-30 NOTE — Clinical Note
Domenic Martines, Saw Mrs. Costa in clinic after a hospital visit and she is having hypoglycemic episodes on her current regimen of detemir 3 BID and aspart 10/10/12. Resolved with orange juice but  reported it was frequent and we recommended decreasing to 8U for mealtime lunch and dinner. Just wanted to see if you had any other suggestions concerning this. Thank you!

## 2022-11-30 NOTE — PROGRESS NOTES
"CC: Hosp follow up        87 y.o. female, patient of Dr. Casillas, with HTN, HLD, IDDM2, asthma and OA presents in f/u to hospitalization.               Admitted: 10/25/22       D/C: 11/5/22    Family and/or Caretaker present at visit?   present  Diagnostic tests reviewed/disposition: I have reviewed all imaging and pathology reports and discussed with patient.  Disease/illness education: Discussion out cancer diagnosis as well as asthma exacerbation  Home health/community services discussion/referrals:   nursing  Establishment or re-establishment of referral orders for community resources:  N/a  Discussion with other health care providers:  Will discuss with endocrinology and CRS            Patient was seen in ED/ clinic          Hospital course from CRS discharge note,   "CT scan showed a 10cm ascending colon with decompressed colon distal to the hepatic flexure, suggestion of a mass at the hepatic flexure, and no small bowel dilation.  She was brought to surgery emergently 10/25.  Surgery vise she did well, abd inc healing well, eating and having stools but her respirtory status declinded.  Hx of ashtm and mucous plugging, resp treatments begun, aggressive pulmonary toileting, pulmonary consulted and placed her on antibiotics and more resp treaemtns incluidng chest pr and IPV.  She slowly improved, some hypoxia with ambulation with PT, discussed with pulmonary.  Consulted rehab due to her deconditioing and respiratory status.  She was accepted the rehave with transferred there 11/2, needs to fu with Dr. Nobles after she is discharged from rehab and poss urology for urinary retention requiring ca catheter.  "          Dx: abdominal pain and distension      Tx: right hemicolectomy for LBO      Disposition: resolved        Meds: Detemir 10 BID and Aspart 10 TID with SSI, albuterol and advair. Recently given doxycycline with 2 days left   Having low BG readings on current insulin regimen. 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  Suspect obstructing ascending colon neoplasm.                 Follow up visits:                 Urogynecology visit for voiding trial which was successful        Planned 12/8/22 with CRS Dr. Holman        Patient reported bilateral leg pain and swelling via mychart was sent in doxycycline.         Since discharge: Patient was discharged to [facility] on 11/2 to 11/16.                 Sx: see plan              Medcard: reviewed        Review of Systems   Constitutional:  Positive for weight loss. Negative for chills, fever and malaise/fatigue.   HENT:  Negative for congestion and sore throat.    Eyes:  Negative for blurred vision and pain.   Respiratory:  Negative for cough, shortness of breath and wheezing.    Cardiovascular:  Positive for leg swelling. Negative for chest pain, palpitations, orthopnea, claudication and PND.   Gastrointestinal:  Negative for abdominal pain, blood in stool, constipation, diarrhea, melena, nausea and vomiting.        Loose stools   Genitourinary:  Negative for dysuria and hematuria.   Skin:  Negative for rash.   Neurological:  Negative for dizziness, weakness and headaches.   Psychiatric/Behavioral: Negative.              ADL's: independent         AD's: independent         Remainder of review negative except as previously noted    PMHX: Reviewed  PSHX: Reviewed  FHX:    Reviewed  SHX:    Reviewed    Physical Exam  Vitals and nursing note reviewed.   Constitutional:       General: She is not in acute distress.     Appearance: Normal appearance. She is not ill-appearing or  toxic-appearing.   HENT:      Mouth/Throat:      Mouth: Mucous membranes are moist.   Eyes:      General: No scleral icterus.     Extraocular Movements: Extraocular movements intact.      Pupils: Pupils are equal, round, and reactive to light.   Cardiovascular:      Rate and Rhythm: Normal rate and regular rhythm.      Pulses: Normal pulses.      Heart sounds: Normal heart sounds. No murmur heard.  Pulmonary:      Effort: Pulmonary effort is normal. No respiratory distress.      Breath sounds: Normal breath sounds. No wheezing or rales.   Abdominal:      General: Bowel sounds are normal. There is no distension.      Palpations: Abdomen is soft.      Tenderness: There is no abdominal tenderness.   Musculoskeletal:      Cervical back: Normal range of motion.      Right lower le+ Edema present.      Left lower leg: 3+ Edema present.   Lymphadenopathy:      Cervical: No cervical adenopathy.   Skin:     General: Skin is warm and dry.      Capillary Refill: Capillary refill takes less than 2 seconds.      Coloration: Skin is not jaundiced.      Findings: Erythema (bilateral LE) present. No bruising or rash.   Neurological:      General: No focal deficit present.      Mental Status: She is alert and oriented to person, place, and time.      Cranial Nerves: No cranial nerve deficit.      Motor: No weakness.   Psychiatric:         Mood and Affect: Mood normal.         Behavior: Behavior normal.         Thought Content: Thought content normal.         Judgment: Judgment normal.       IMPRESSION:    PLAN:  Post Hospital D/C Medications have been reconciled    Hospital follow up   Malignant large bowel obstruction  Patient reports BM are soft to liquid but no BRB or melena noted. Patient reports weight has been unchanged since discharge. Patient has been ambulating independently but used walker briefly. Patient is scheduled to CRS on 22. Will send heme onc referral.     HTN  Previously on irbesartan. Has had all  normotensive BP readings off of medication since hospital visit. Will discontinue    DM  Reports episodes of hypglycemia since discharge currently detemir 10 U BID and aspart 10 U TID and reported BG low as 70 resolved with orange juice. Patient reports low BG readings are primarily before dinner but no night time hypoglycemic events. Will discuss with endocrinology for reduction vs changing mealtime lunch and dinner to 8 U. Will check A1C and CMP to assess renal function given hypoglycemic episodes.     OLIVERA/asthma  On spiriva and advair and PRN albuterol which she has not used since discharge. Patient denies any coughing or wheezing since discharge. Patient reports OLIVERA with ambulation far distances and activity. No wheezes or rhales on ausculation. Continue inhalers.     Leg swelling  2 more days of doxycycline with improvement based on pictures taken from HH nurse at time of event. Patient denies any fevers, chills or worsening leg swelling. Low concern for DVT or cellulitis given bilateral nature. Will check BNP and CMP to assess and consider PRN lasix if up.            Titus Hagan DO  PGY 3 Internal Medicine.

## 2022-11-30 NOTE — PATIENT INSTRUCTIONS
Decrease mealtime insulin or aspart for LUNCH AND DINNER TO 8 UNITS    Continue detemir or levemir 3 U in morning and afternoon    Will discuss with Dr. Martines about changes    Continue creon 2 capsules with meals

## 2022-12-01 ENCOUNTER — TELEPHONE (OUTPATIENT)
Dept: INTERNAL MEDICINE | Facility: CLINIC | Age: 87
End: 2022-12-01
Payer: MEDICARE

## 2022-12-01 LAB
ALBUMIN SERPL BCP-MCNC: 2.8 G/DL (ref 3.5–5.2)
ALP SERPL-CCNC: 52 U/L (ref 55–135)
ALT SERPL W/O P-5'-P-CCNC: 20 U/L (ref 10–44)
ANION GAP SERPL CALC-SCNC: 6 MMOL/L (ref 8–16)
AST SERPL-CCNC: 25 U/L (ref 10–40)
BASOPHILS # BLD AUTO: 0.05 K/UL (ref 0–0.2)
BASOPHILS NFR BLD: 0.7 % (ref 0–1.9)
BILIRUB SERPL-MCNC: 0.2 MG/DL (ref 0.1–1)
BNP SERPL-MCNC: 107 PG/ML (ref 0–99)
BUN SERPL-MCNC: 9 MG/DL (ref 8–23)
CALCIUM SERPL-MCNC: 9.4 MG/DL (ref 8.7–10.5)
CHLORIDE SERPL-SCNC: 111 MMOL/L (ref 95–110)
CO2 SERPL-SCNC: 23 MMOL/L (ref 23–29)
CREAT SERPL-MCNC: 0.6 MG/DL (ref 0.5–1.4)
DIFFERENTIAL METHOD: ABNORMAL
EOSINOPHIL # BLD AUTO: 1.5 K/UL (ref 0–0.5)
EOSINOPHIL NFR BLD: 22.9 % (ref 0–8)
ERYTHROCYTE [DISTWIDTH] IN BLOOD BY AUTOMATED COUNT: 16.5 % (ref 11.5–14.5)
EST. GFR  (NO RACE VARIABLE): >60 ML/MIN/1.73 M^2
ESTIMATED AVG GLUCOSE: 151 MG/DL (ref 68–131)
GLUCOSE SERPL-MCNC: 45 MG/DL (ref 70–110)
HBA1C MFR BLD: 6.9 % (ref 4–5.6)
HCT VFR BLD AUTO: 32.4 % (ref 37–48.5)
HGB BLD-MCNC: 9.8 G/DL (ref 12–16)
IMM GRANULOCYTES # BLD AUTO: 0.02 K/UL (ref 0–0.04)
IMM GRANULOCYTES NFR BLD AUTO: 0.3 % (ref 0–0.5)
LYMPHOCYTES # BLD AUTO: 2.2 K/UL (ref 1–4.8)
LYMPHOCYTES NFR BLD: 32.4 % (ref 18–48)
MCH RBC QN AUTO: 28.5 PG (ref 27–31)
MCHC RBC AUTO-ENTMCNC: 30.2 G/DL (ref 32–36)
MCV RBC AUTO: 94 FL (ref 82–98)
MONOCYTES # BLD AUTO: 0.6 K/UL (ref 0.3–1)
MONOCYTES NFR BLD: 8.9 % (ref 4–15)
NEUTROPHILS # BLD AUTO: 2.3 K/UL (ref 1.8–7.7)
NEUTROPHILS NFR BLD: 34.8 % (ref 38–73)
NRBC BLD-RTO: 0 /100 WBC
PLATELET # BLD AUTO: 396 K/UL (ref 150–450)
PMV BLD AUTO: 12.3 FL (ref 9.2–12.9)
POTASSIUM SERPL-SCNC: 4.3 MMOL/L (ref 3.5–5.1)
PROT SERPL-MCNC: 6.6 G/DL (ref 6–8.4)
RBC # BLD AUTO: 3.44 M/UL (ref 4–5.4)
SODIUM SERPL-SCNC: 140 MMOL/L (ref 136–145)
WBC # BLD AUTO: 6.72 K/UL (ref 3.9–12.7)

## 2022-12-01 NOTE — TELEPHONE ENCOUNTER
----- Message from Matteo Smith sent at 12/1/2022  8:01 AM CST -----  Regarding: Critical Lab  Hello I work in the chemistry lab at [facility]. We have a critical result on this patient. Could you please give us a call at ext [phone], [phone], or call [phone] and ask for the chemistry lab. Thank You.

## 2022-12-01 NOTE — TELEPHONE ENCOUNTER
Spoke with patient via telephone concerning critical BG value on CMP. Patient reported reducing aspart to 8 U TID and I relayed to her my discussion with Dr. Martines her endocrinologist about lowering aspart to 5U TID. Patient agrees to change and also reports AM glucose was 150. Patient denies any further symptoms and other labs were discussed with patient. BNP at baseline will hold on any additional diuresis. All questions answered.

## 2022-12-02 ENCOUNTER — TELEPHONE (OUTPATIENT)
Dept: HEMATOLOGY/ONCOLOGY | Facility: CLINIC | Age: 87
End: 2022-12-02
Payer: MEDICARE

## 2022-12-02 DIAGNOSIS — C18.9 COLON CANCER: Primary | ICD-10-CM

## 2022-12-02 NOTE — TELEPHONE ENCOUNTER
-----from Nakul English MD sent at 12/1/2022  6:06 PM -----  Regarding: RE: new GI cancer  She could see one of us on 12/8; will be a tough decision whether or not to recommend adjuvant chemo for an elderly woman with relatively high risk stage II colon cancer. Will need PGX and  CT chest non con, although those nodules seem stable since 2011.    - Nakul    ----- Message -----  From: Katia Hughes RN  Sent: 12/1/2022   9:53 AM CST  To: Nakul English MD  Subject: new GI cancer                                    Dr English,    Pt was in hospital for abdo pain and Dr Holman did colectomy 10/25.  On 11/2 path came back: Small intestine, appendix, right colon (right colectomy):   - Positive for malignancy   Pathologic staging:  pT4a  N0 MX    Pt at Rehab 11/2-11/16 for deconditioning and respiratory status.    Her follow up with Dr Holman 12/8.  In the meantime, pt saw Internal Med who sent urgent referral for hem-onc.      CT ab/pel done 10/25.  CT chest last May shows lung nodules.    CEA in Oct= 4.7   Cbc/cmp yesterday.    When do I schedule? Need scans & pgx?    - Katia      ----- Message -----  From: Danielle Villar  Sent: 12/1/2022   8:23 AM CST  To: [facility] Navigation    Good Morning,    Dr. Titus Hagan has put in an Urgent referral for Ms. Costa to schedule with Hematology/Oncology. Please give her a call to assist with getting scheduled.    Large bowel obstruction [A75.726]    Thank you in advance,  Danielle

## 2022-12-07 ENCOUNTER — TELEPHONE (OUTPATIENT)
Dept: SURGERY | Facility: CLINIC | Age: 87
End: 2022-12-07
Payer: MEDICARE

## 2022-12-07 ENCOUNTER — TELEPHONE (OUTPATIENT)
Dept: INTERNAL MEDICINE | Facility: CLINIC | Age: 87
End: 2022-12-07
Payer: MEDICARE

## 2022-12-07 PROBLEM — C18.9 COLON CANCER: Status: ACTIVE | Noted: 2022-10-28

## 2022-12-07 RX ORDER — FUROSEMIDE 20 MG/1
TABLET ORAL
Qty: 5 TABLET | Refills: 2 | Status: SHIPPED | OUTPATIENT
Start: 2022-12-07 | End: 2022-12-15

## 2022-12-07 NOTE — TELEPHONE ENCOUNTER
----- Message from Karen Griffiths sent at 12/7/2022  2:19 PM CST -----  Contact: Pt Mobile [phone]  Patient is calling in regards to her saying that her legs are swollen, painful, and they're red for two weeks now, and she would like to know what should she take for it?

## 2022-12-07 NOTE — PROGRESS NOTES
MEDICAL ONCOLOGY - NEW PATIENT VISIT    Best Contact Phone Number(s): [phone] (home)      Cancer/Stage/TNM:    Cancer Staging   No matching staging information was found for the patient.     Reason for visit: Ms. Costa is an 87 year old woman with history of asthma, recurrent PNA and hypoxic respiratory failure, and DM2 who was recently hospitalized 10/2022 for large bowel obstruction. 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 CT chest 12/8/22 concerning for multiple new lung nodules. She presents to medical oncology clinic for initial evaluation.    History has been obtained by chart review and discussion with the patient.    HPI:     Patient reports about one year of weight loss (123lb - 105lb) with associated diarrhea/soft stool over the last year with urgency and incontinence. No hematochezia was noted. 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 Additionally, she has had recurrent episodes of pneumonia over the last 2-3 years. Recent hospitalizations in May and July; found to have pseudomonas and moraxella in sputum culture May 2022.     More recently she presented in October with one day of worsening abdominal cramping found to have large bowel obstruction on CT imaging 10/25/22. She underwent urgent right sided hemicolectomy; post operative course notable for ongoing respiratory failure and she was discharged to rehab.    She returned home from rehab 11/2/22 and has been recovering well. She has chronic dyspnea on exertion. No current cough. Ambulating independently for at least a quarter of a mile at a time. No chest pain. Her blood pressure is elevated today. No headaches CP, or SOB, or vision changes.    Oncology History   Colon cancer   10/25/2022 Surgery    Right  hemicolectomy    Moderately differentiated MMR proficient adenocarcinoma invading into the visceral peritoneum with associated PNI, LVI, and high tumor budding. Negative margins. 0/18 LN. [id number]     10/25/2022 Imaging Significant Findings    CT a/p with contrast:     1. Circumferential wall thickening of the short segment of ascending colon and apple-core appearance with pericolic fat stranding and free fluid, resulting in significant mass effect and dilatation of the cecum up to measuring 10 cm.  Suspect obstructing ascending colon neoplasm.    2. Diffuse gastric wall thickening and enhancement suggesting gastritis.  3. Stable innumerable pancreatic hypodense lesion.  4. Pulmonary nodules similar in size and number in comparison prior.  Metastasis not excluded.  5. Multiple hypodense lesions in the liver and a few in the spleen as well, similar to prior.  Metastasis not excluded.       12/7/2022 Imaging Significant Findings    CT chest:    1. Following resolution of left inter lobar and lower lobe bronchi mucous plugging, there is resolution of previous left lower lobe atelectasis.  However, there are many bilateral subcentimeter pulmonary nodules in both lower lobes which appear increased since a CT of the abdomen and pelvis 10/25/2022 in which there is partial imaging of the chest base.  There is a new 1.4 cm right lower lobe nodule.  2. Interval development of small volume right-sided pleural effusion.  This report was flagged in Epic as abnormal.              Past Medical History:   Diagnosis Date    Asthma     Cyst of pancreas     liver    Diabetes mellitus type II     Eczema of hand     Glaucoma     History of recurrent pneumonia 9/28/2020    Hyperlipidemia     Hypertension     Lichen planus     gums    Osteoporosis     Pulmonary nodules         Past Surgical History:   Procedure Laterality Date    BRONCHOSCOPY N/A 5/13/2022    Procedure: Bronchoscopy;  Surgeon: [facility] Diagnostic Provider;  Location: [facility] OR  2ND FLR;  Service: Anesthesiology;  Laterality: N/A;    CATARACT EXTRACTION, BILATERAL      CHOLECYSTECTOMY      COLECTOMY, RIGHT Right 10/25/2022    Procedure: COLECTOMY, RIGHT;  Surgeon: Jass Holmna MD;  Location: [facility] OR 2ND FLR;  Service: Colon and Rectal;  Laterality: Right;    ENDOSCOPIC ULTRASOUND OF UPPER GASTROINTESTINAL TRACT N/A 4/22/2022    Procedure: ULTRASOUND, UPPER GI TRACT, ENDOSCOPIC;  Surgeon: Max Rosado MD;  Location: [facility] ENDO (2ND FLR);  Service: Endoscopy;  Laterality: N/A;  urgent EUS (linear) for abnormal CT scan with dilated PD and increased cyst of pancreas cyst.  pap 44 mmHg  4/13:fully vaccinated. instructions via portal-SC    EPIDURAL STEROID INJECTION INTO LUMBAR SPINE N/A 11/8/2018    Procedure: Injection-steroid-epidural-lumbar L5-S1;  Surgeon: Nicci Osorio Jr., MD;  [facility];  Service: Pain Management;  Laterality: N/A;    FUNCTIONAL ENDOSCOPIC SINUS SURGERY (FESS) USING COMPUTER-ASSISTED NAVIGATION N/A 6/17/2019    Procedure: FESS, USING COMPUTER-ASSISTED NAVIGATION;  Surgeon: Rosangela Isabel MD;  Location: [facility] OR;  Service: ENT;  Laterality: N/A;  video    HYSTERECTOMY      nasal polyps          Family History   Problem Relation Age of Onset    Heart disease Father     Heart disease Brother     Hypertension Brother         Social History     Tobacco Use    Smoking status: Never     Passive exposure: Never    Smokeless tobacco: Never   Substance Use Topics    Alcohol use: Yes     Comment: socially        I have reviewed and updated the patient's past medical, surgical, family and social histories.    Review of patient's allergies indicates:   Allergen Reactions    Aspirin Other (See Comments)     Asthma    TRIAD OF NASAL POLYPS, ASA  ALLERGY AND ASTHMA.        Aspirin Other (See Comments)    Nsaids (non-steroidal anti-inflammatory drug) Other (See Comments)     AVOID DUE TO TRIAD OF ASA ALLERGY, NASAL POLYPS,AND ASTHMA  AVOID DUE TO  TRIAD OF ASA ALLERGY, NASAL POLYPS,AND ASTHMA    Penicillin      Other reaction(s): Rash    20: tolerates ceftriaxone    Penicillin g Other (See Comments)     Other reaction(s): Rash    20: tolerates ceftriaxone        Current Outpatient Medications   Medication Sig Dispense Refill    fluticasone-salmeterol diskus inhaler 500-50 mcg Inhale 1 puff into the lungs 2 (two) times daily. Controller 180 each 3    ACCU-CHEK GUIDE TEST STRIPS Strp TEST FOUR TIMES DAILY WITH MEALS AND NIGHTLY 200 strip 11    albuterol (PROAIR HFA) 90 mcg/actuation inhaler Inhale 2 puffs into the lungs every 6 (six) hours as needed for Wheezing. Rescue 18 g 3    albuterol (PROVENTIL) 2.5 mg /3 mL (0.083 %) nebulizer solution Take 3 mLs (2.5 mg total) by nebulization every 6 (six) hours as needed for Wheezing or Shortness of Breath. Rescue 60 each 11    albuterol-ipratropium (DUO-NEB) 2.5 mg-0.5 mg/3 mL nebulizer solution Take 3 mLs by nebulization every 4 (four) hours. Rescue 1 mL 0    atorvastatin (LIPITOR) 20 MG tablet Take 1 tablet (20 mg total) by mouth every evening. 90 tablet 0    blood sugar diagnostic Strp 1 strip by Misc.(Non-Drug; Combo Route) route 4 (four) times daily with meals and nightly. 200 strip 11    budesonide 1 mg/2 mL NbSp SMARTSI Vial(s) Both Nares Twice Daily      furosemide (LASIX) 20 MG tablet 1 po qd x 5 days 5 tablet 2    glucagon (GVOKE HYPOPEN 2-PACK) 1 mg/0.2 mL AtIn Inject 1 mg into the skin as needed (severe hypoglycemia). 1 each 1    insulin aspart U-100 (NOVOLOG FLEXPEN U-100 INSULIN) 100 unit/mL (3 mL) InPn pen Take 10 units with breakfast and lunch, and 12 units with dinner, plus correction scale, max TDD 40 units 36 mL 3    insulin detemir U-100 (LEVEMIR FLEXTOUCH U-100 INSULN) 100 unit/mL (3 mL) InPn pen Inject 3 Units into the skin 2 (two) times daily. 15 mL 11    irbesartan (AVAPRO) 300 MG tablet Take 1 tablet (300 mg total) by mouth every evening. 90 tablet 3    lancets (ACCU-CHEK FASTCLIX  "LANCET DRUM) [facility] CHECK BLOOD GLUCOSE FOUR TIMES DAILY 200 each 3    latanoprost 0.005 % ophthalmic solution Inject into the eye. 1 Drops Ophthalmic Every evening      levocetirizine (XYZAL) 5 MG tablet Take 1 tablet (5 mg total) by mouth every evening. 90 tablet 3    lipase-protease-amylase 24,000-76,000-120,000 units (CREON) 24,000-76,000 -120,000 unit capsule Take 2 capsules with meals orally and 1 capsule with snacks. 270 capsule 11    magnesium oxide (MAG-OX) 400 mg (241.3 mg magnesium) tablet Take 400 mg by mouth once daily.      montelukast (SINGULAIR) 10 mg tablet TAKE 1 TABLET BY MOUTH EVERY EVENING 30 tablet 6    neomycin-polymyxin-hydrocortisone (CORTISPORIN) 3.5-10,000-1 mg/mL-unit/mL-% otic suspension Use bid for nails 10 mL 6    olopatadine (PATANOL) 0.1 % ophthalmic solution Place 1 drop into both eyes 2 (two) times daily as needed. 1 Drops Ophthalmic Twice a day       pen needle, diabetic 31 gauge x 5/16" Ndle Inject 1 each into the skin 4 (four) times daily with meals and nightly. Use with insulin pen 200 each 11    sodium chloride 3% 3 % nebulizer solution Take 4 mLs by nebulization every 6 (six) hours. 1 mL 0    tiotropium bromide (SPIRIVA RESPIMAT) 2.5 mcg/actuation inhaler Inhale 2 puffs into the lungs once daily. Controller 1 g 0    vitamin D (VITAMIN D3) 1000 units Tab Take 1,000 Units by mouth once daily.       No current facility-administered medications for this visit.        Physical Exam:   BP (!) 194/95 (BP Location: Right arm, Patient Position: Sitting, BP Method: Medium (Automatic))   Pulse 94   Temp 96.8 °F (36 °C) (Oral)   Resp 18   Ht 5' 5" (1.651 m)   Wt 47.6 kg (104 lb 15 oz)   SpO2 97%   BMI 17.46 kg/m²      ECOG Performance status: 2            Physical Exam  Constitutional:       General: She is not in acute distress.     Appearance: She is normal weight.   HENT:      Head: Normocephalic.      Nose: Nose normal.      Mouth/Throat:      Mouth: Mucous membranes are moist. "      Pharynx: Oropharynx is clear.   Eyes:      Conjunctiva/sclera: Conjunctivae normal.      Pupils: Pupils are equal, round, and reactive to light.   Cardiovascular:      Rate and Rhythm: Normal rate.   Pulmonary:      Effort: Pulmonary effort is normal. No respiratory distress.   Abdominal:      General: There is no distension.      Palpations: Abdomen is soft.      Tenderness: There is no abdominal tenderness.   Musculoskeletal:         General: Normal range of motion.      Cervical back: Normal range of motion and neck supple.      Right lower leg: No edema.      Left lower leg: No edema.   Lymphadenopathy:      Cervical: No cervical adenopathy.   Skin:     General: Skin is warm.   Neurological:      General: No focal deficit present.      Mental Status: She is alert and oriented to person, place, and time.      Motor: No weakness.   Psychiatric:         Mood and Affect: Mood normal.         Behavior: Behavior normal.         Thought Content: Thought content normal.         Labs:   Recent Results (from the past 48 hour(s))   Comprehensive Metabolic Panel    Collection Time: 12/09/22 12:17 PM   Result Value Ref Range    Sodium 142 136 - 145 mmol/L    Potassium 3.2 (L) 3.5 - 5.1 mmol/L    Chloride 108 95 - 110 mmol/L    CO2 27 23 - 29 mmol/L    Glucose 84 70 - 110 mg/dL    BUN 12 8 - 23 mg/dL    Creatinine 0.7 0.5 - 1.4 mg/dL    Calcium 8.9 8.7 - 10.5 mg/dL    Total Protein 7.1 6.0 - 8.4 g/dL    Albumin 3.1 (L) 3.5 - 5.2 g/dL    Total Bilirubin 0.6 0.1 - 1.0 mg/dL    Alkaline Phosphatase 57 55 - 135 U/L    AST 21 10 - 40 U/L    ALT 19 10 - 44 U/L    Anion Gap 7 (L) 8 - 16 mmol/L    eGFR >60.0 >60 mL/min/1.73 m^2   CBC Oncology    Collection Time: 12/09/22 12:17 PM   Result Value Ref Range    WBC 9.89 3.90 - 12.70 K/uL    RBC 3.49 (L) 4.00 - 5.40 M/uL    Hemoglobin 10.1 (L) 12.0 - 16.0 g/dL    Hematocrit 33.0 (L) 37.0 - 48.5 %    MCV 95 82 - 98 fL    MCH 28.9 27.0 - 31.0 pg    MCHC 30.6 (L) 32.0 - 36.0 g/dL    RDW  16.2 (H) 11.5 - 14.5 %    Platelets 346 150 - 450 K/uL    MPV 11.1 9.2 - 12.9 fL    Gran # (ANC) 3.8 1.8 - 7.7 K/uL    Immature Grans (Abs) 0.02 0.00 - 0.04 K/uL   FERRITIN    Collection Time: 12/09/22 12:17 PM   Result Value Ref Range    Ferritin 63 20.0 - 300.0 ng/mL   IRON AND TIBC    Collection Time: 12/09/22 12:17 PM   Result Value Ref Range    Iron 32 30 - 160 ug/dL    Transferrin 281 200 - 375 mg/dL    TIBC 416 250 - 450 ug/dL    Saturated Iron 8 (L) 20 - 50 %          Imaging:         I have personally reviewed the imaging including CT chest 12/8/22 and CT a/p 10/25/22    Path:   Reviewed pathology as documented in oncology history      Assessment and Plan:   1. Malignant neoplasm of ascending colon  Overview:  Right sided colon cancer diagnosed in setting of LBO requiring urgent hemicolectomy 10/25/2022 with path showing pT4aN0 disease. CT chest 12/7/22 with possible lung metastases and MR liver with indeterminate liver lesions.      Assessment & Plan:  - MR liver  - IR consult for lung biopsy to determine if she has metastatic disease  - TEMPUS xT to determine JOHAN/JUNIE/HER2 and TMB status  - FU in two weeks to determine treatment plan; if stage II would likely defer adjuvant chemo; if metastatic would consider single agent capecitabine + bevacizumab or targetted agent if available.    Orders:  -     Ambulatory referral/consult to Hematology / Oncology  -     Cancel: MRI Abdomen W WO Contrast; Future; Expected date: 12/09/2022  -     Ambulatory referral/consult  to [facility] Interventional RAD; Future; Expected date: 12/16/2022  -     MRI Abdomen W WO Contrast; Future; Expected date: 12/09/2022  -     Tumor NGS Tissue; Future; Expected date: 12/09/2022  -     Tumor NGS Blood Add-on (Only to be ordered with Tissue NGS); Future; Expected date: 12/09/2022  -     Comprehensive Metabolic Panel; Standing  -     CEA; Standing  -     CBC Oncology; Standing  -     FERRITIN; Future; Expected date: 12/09/2022  -     IRON  AND [facility]; Future; Expected date: 12/09/2022    2. Primary hypertension  Assessment & Plan:  Recently stopped carvedilol and irbesartan. Poorly controlled today.    - Will restart irbesartan today.    Orders:  -     irbesartan (AVAPRO) 300 MG tablet; Take 1 tablet (300 mg total) by mouth every evening.  Dispense: 90 tablet; Refill: 3    3. Moderate persistent asthma without complication  Assessment & Plan:  Followed by Dr. Peguero. Recently switched from Advair to Spiriva. Remains on montelukast.    - Stable  - Con't home meds      4. Type 2 diabetes mellitus with microalbuminuria, without long-term current use of insulin  Overview:  Home regimen includes levemir and Novolog.     Assessment & Plan:  - Con't home regimen      5. Diastolic dysfunction  Overview:  Last TTE 07/2022. Continues on furosemide 20mg po daily.      6. Pancreatic insufficiency  Overview:  Diagnosed in setting of weight loss and bowel incontinence 2022.    Assessment & Plan:  - Continues on enzyme supplementation      7. Lower extremity edema  Overview:  Home medications include furosemide 20mg po daily    Assessment & Plan:  - Patient continues on home lasix 20mg daily             Follow up:   Route Chart for Scheduling    Med Onc Chart Routing      Follow up with physician . MOISES English 12/22/22   Follow up with DOMONIQUE    Infusion scheduling note    Injection scheduling note    Labs CBC, CMP and CEA   Lab interval:     Imaging MRI      Pharmacy appointment    Other referrals          Therapy Plan Information  Medications  denosumab (PROLIA) injection 60 mg  60 mg, Subcutaneous, Every 26 weeks        The above information has been reviewed with the patient and all questions have been answered to their apparent satisfaction.  They understand that they can call the clinic with any questions.    Nakul English MD  Hematology/Oncology  [facility]

## 2022-12-08 ENCOUNTER — HOSPITAL ENCOUNTER (OUTPATIENT)
Dept: RADIOLOGY | Facility: HOSPITAL | Age: 87
Discharge: HOME | End: 2022-12-08
Attending: HOSPITALIST
Payer: MEDICARE

## 2022-12-08 ENCOUNTER — OFFICE VISIT (OUTPATIENT)
Dept: SURGERY | Facility: CLINIC | Age: 87
End: 2022-12-08
Payer: MEDICARE

## 2022-12-08 VITALS
HEART RATE: 95 BPM | WEIGHT: 107 LBS | DIASTOLIC BLOOD PRESSURE: 72 MMHG | BODY MASS INDEX: 17.83 KG/M2 | SYSTOLIC BLOOD PRESSURE: 122 MMHG | HEIGHT: 65 IN

## 2022-12-08 DIAGNOSIS — C18.3 MALIGNANT NEOPLASM OF HEPATIC FLEXURE: Primary | ICD-10-CM

## 2022-12-08 DIAGNOSIS — C18.9 COLON CANCER: ICD-10-CM

## 2022-12-08 PROCEDURE — 99024 PR POST-OP FOLLOW-UP VISIT: ICD-10-PCS | Mod: POP,,, | Performed by: COLON & RECTAL SURGERY

## 2022-12-08 PROCEDURE — 99024 POSTOP FOLLOW-UP VISIT: CPT | Mod: POP,,, | Performed by: COLON & RECTAL SURGERY

## 2022-12-08 PROCEDURE — 71250 CT THORAX DX C-: CPT | Mod: TC

## 2022-12-08 PROCEDURE — 99999 PR PBB SHADOW E&M-EST. PATIENT-LVL IV: ICD-10-PCS | Mod: PBBFAC,,, | Performed by: COLON & RECTAL SURGERY

## 2022-12-08 PROCEDURE — 99999 PR PBB SHADOW E&M-EST. PATIENT-LVL IV: CPT | Mod: PBBFAC,,, | Performed by: COLON & RECTAL SURGERY

## 2022-12-08 PROCEDURE — 99214 OFFICE O/P EST MOD 30 MIN: CPT | Mod: PBBFAC,25 | Performed by: COLON & RECTAL SURGERY

## 2022-12-08 PROCEDURE — 71250 CT THORAX DX C-: CPT | Mod: 26,,, | Performed by: RADIOLOGY

## 2022-12-08 PROCEDURE — 71250 CT CHEST WITHOUT CONTRAST: ICD-10-PCS | Mod: 26,,, | Performed by: RADIOLOGY

## 2022-12-08 NOTE — PROGRESS NOTES
"  S/p extended right colectomy for large bowel obstruction - [id number] - doing well.  1-2 bowel movements /day. Occasional incontinence. Wears incontinence garment.  Energy level and appetite near baseline.     Exam: /72 (BP Location: Left arm, Patient Position: Sitting, BP Method: Large (Automatic))   Pulse 95   Ht 5' 5" (1.651 m)   Wt 48.5 kg (107 lb)   BMI 17.81 kg/m²   Well appearing.  Wounds without infection.    A/P F/u with me prn. Has appt with heme/onc tomorrow.  Cscope 1 year.   "

## 2022-12-09 ENCOUNTER — OFFICE VISIT (OUTPATIENT)
Dept: HEMATOLOGY/ONCOLOGY | Facility: CLINIC | Age: 87
End: 2022-12-09
Payer: MEDICARE

## 2022-12-09 ENCOUNTER — LAB VISIT (OUTPATIENT)
Dept: LAB | Facility: HOSPITAL | Age: 87
End: 2022-12-09
Attending: HOSPITALIST
Payer: MEDICARE

## 2022-12-09 VITALS
SYSTOLIC BLOOD PRESSURE: 194 MMHG | DIASTOLIC BLOOD PRESSURE: 95 MMHG | HEIGHT: 65 IN | WEIGHT: 104.94 LBS | BODY MASS INDEX: 17.48 KG/M2 | TEMPERATURE: 97 F | HEART RATE: 94 BPM | RESPIRATION RATE: 18 BRPM | OXYGEN SATURATION: 97 %

## 2022-12-09 DIAGNOSIS — R80.9 TYPE 2 DIABETES MELLITUS WITH MICROALBUMINURIA, WITHOUT LONG-TERM CURRENT USE OF INSULIN: ICD-10-CM

## 2022-12-09 DIAGNOSIS — J45.40 MODERATE PERSISTENT ASTHMA WITHOUT COMPLICATION: ICD-10-CM

## 2022-12-09 DIAGNOSIS — I10 PRIMARY HYPERTENSION: ICD-10-CM

## 2022-12-09 DIAGNOSIS — E11.29 TYPE 2 DIABETES MELLITUS WITH MICROALBUMINURIA, WITHOUT LONG-TERM CURRENT USE OF INSULIN: ICD-10-CM

## 2022-12-09 DIAGNOSIS — C18.2 MALIGNANT NEOPLASM OF ASCENDING COLON: Primary | ICD-10-CM

## 2022-12-09 DIAGNOSIS — I51.89 DIASTOLIC DYSFUNCTION: ICD-10-CM

## 2022-12-09 DIAGNOSIS — R60.0 LOWER EXTREMITY EDEMA: ICD-10-CM

## 2022-12-09 DIAGNOSIS — K86.89 PANCREATIC INSUFFICIENCY: ICD-10-CM

## 2022-12-09 DIAGNOSIS — C18.2 MALIGNANT NEOPLASM OF ASCENDING COLON: ICD-10-CM

## 2022-12-09 PROBLEM — E78.5 DYSLIPIDEMIA ASSOCIATED WITH TYPE 2 DIABETES MELLITUS: Status: RESOLVED | Noted: 2017-11-09 | Resolved: 2022-12-09

## 2022-12-09 PROBLEM — E11.59 HYPERTENSION ASSOCIATED WITH DIABETES: Status: RESOLVED | Noted: 2017-11-09 | Resolved: 2022-12-09

## 2022-12-09 PROBLEM — E87.1 HYPONATREMIA: Status: RESOLVED | Noted: 2022-10-26 | Resolved: 2022-12-09

## 2022-12-09 PROBLEM — Z09 HOSPITAL DISCHARGE FOLLOW-UP: Status: RESOLVED | Noted: 2022-11-30 | Resolved: 2022-12-09

## 2022-12-09 PROBLEM — E11.69 DYSLIPIDEMIA ASSOCIATED WITH TYPE 2 DIABETES MELLITUS: Status: RESOLVED | Noted: 2017-11-09 | Resolved: 2022-12-09

## 2022-12-09 PROBLEM — R07.89 CHEST TIGHTNESS: Status: RESOLVED | Noted: 2022-07-07 | Resolved: 2022-12-09

## 2022-12-09 PROBLEM — J18.9 PNEUMONIA OF LEFT LOWER LOBE DUE TO INFECTIOUS ORGANISM: Status: RESOLVED | Noted: 2017-12-27 | Resolved: 2022-12-09

## 2022-12-09 PROBLEM — E44.0 MODERATE PROTEIN-CALORIE MALNUTRITION: Status: RESOLVED | Noted: 2022-07-06 | Resolved: 2022-12-09

## 2022-12-09 PROBLEM — J96.01 ACUTE HYPOXEMIC RESPIRATORY FAILURE: Status: RESOLVED | Noted: 2017-12-29 | Resolved: 2022-12-09

## 2022-12-09 PROBLEM — R33.9 URINARY RETENTION: Status: RESOLVED | Noted: 2022-11-01 | Resolved: 2022-12-09

## 2022-12-09 PROBLEM — J98.11 ATELECTASIS: Status: RESOLVED | Noted: 2022-10-26 | Resolved: 2022-12-09

## 2022-12-09 PROBLEM — Z87.01 HISTORY OF RECURRENT PNEUMONIA: Status: ACTIVE | Noted: 2020-09-28

## 2022-12-09 PROBLEM — I15.2 HYPERTENSION ASSOCIATED WITH DIABETES: Status: RESOLVED | Noted: 2017-11-09 | Resolved: 2022-12-09

## 2022-12-09 PROBLEM — A41.9 SEPSIS: Status: RESOLVED | Noted: 2017-12-27 | Resolved: 2022-12-09

## 2022-12-09 PROBLEM — T38.0X5A ADVERSE EFFECT OF CORTICOSTEROIDS: Status: RESOLVED | Noted: 2022-07-06 | Resolved: 2022-12-09

## 2022-12-09 LAB
ALBUMIN SERPL BCP-MCNC: 3.1 G/DL (ref 3.5–5.2)
ALP SERPL-CCNC: 57 U/L (ref 55–135)
ALT SERPL W/O P-5'-P-CCNC: 19 U/L (ref 10–44)
ANION GAP SERPL CALC-SCNC: 7 MMOL/L (ref 8–16)
AST SERPL-CCNC: 21 U/L (ref 10–40)
BILIRUB SERPL-MCNC: 0.6 MG/DL (ref 0.1–1)
BUN SERPL-MCNC: 12 MG/DL (ref 8–23)
CALCIUM SERPL-MCNC: 8.9 MG/DL (ref 8.7–10.5)
CEA SERPL-MCNC: <1.7 NG/ML (ref 0–5)
CHLORIDE SERPL-SCNC: 108 MMOL/L (ref 95–110)
CO2 SERPL-SCNC: 27 MMOL/L (ref 23–29)
CREAT SERPL-MCNC: 0.7 MG/DL (ref 0.5–1.4)
ERYTHROCYTE [DISTWIDTH] IN BLOOD BY AUTOMATED COUNT: 16.2 % (ref 11.5–14.5)
EST. GFR  (NO RACE VARIABLE): >60 ML/MIN/1.73 M^2
FERRITIN SERPL-MCNC: 63 NG/ML (ref 20–300)
GLUCOSE SERPL-MCNC: 84 MG/DL (ref 70–110)
HCT VFR BLD AUTO: 33 % (ref 37–48.5)
HGB BLD-MCNC: 10.1 G/DL (ref 12–16)
IMM GRANULOCYTES # BLD AUTO: 0.02 K/UL (ref 0–0.04)
IRON SERPL-MCNC: 32 UG/DL (ref 30–160)
MCH RBC QN AUTO: 28.9 PG (ref 27–31)
MCHC RBC AUTO-ENTMCNC: 30.6 G/DL (ref 32–36)
MCV RBC AUTO: 95 FL (ref 82–98)
NEUTROPHILS # BLD AUTO: 3.8 K/UL (ref 1.8–7.7)
PLATELET # BLD AUTO: 346 K/UL (ref 150–450)
PMV BLD AUTO: 11.1 FL (ref 9.2–12.9)
POTASSIUM SERPL-SCNC: 3.2 MMOL/L (ref 3.5–5.1)
PROT SERPL-MCNC: 7.1 G/DL (ref 6–8.4)
RBC # BLD AUTO: 3.49 M/UL (ref 4–5.4)
SATURATED IRON: 8 % (ref 20–50)
SODIUM SERPL-SCNC: 142 MMOL/L (ref 136–145)
TOTAL IRON BINDING CAPACITY: 416 UG/DL (ref 250–450)
TRANSFERRIN SERPL-MCNC: 281 MG/DL (ref 200–375)
WBC # BLD AUTO: 9.89 K/UL (ref 3.9–12.7)

## 2022-12-09 PROCEDURE — 99999 PR PBB SHADOW E&M-EST. PATIENT-LVL V: ICD-10-PCS | Mod: PBBFAC,,, | Performed by: HOSPITALIST

## 2022-12-09 PROCEDURE — 85027 COMPLETE CBC AUTOMATED: CPT | Performed by: HOSPITALIST

## 2022-12-09 PROCEDURE — 99205 OFFICE O/P NEW HI 60 MIN: CPT | Mod: S$PBB,,, | Performed by: HOSPITALIST

## 2022-12-09 PROCEDURE — 99205 PR OFFICE/OUTPT VISIT, NEW, LEVL V, 60-74 MIN: ICD-10-PCS | Mod: S$PBB,,, | Performed by: HOSPITALIST

## 2022-12-09 PROCEDURE — 82728 ASSAY OF FERRITIN: CPT | Performed by: HOSPITALIST

## 2022-12-09 PROCEDURE — 99999 PR PBB SHADOW E&M-EST. PATIENT-LVL V: CPT | Mod: PBBFAC,,, | Performed by: HOSPITALIST

## 2022-12-09 PROCEDURE — 99215 OFFICE O/P EST HI 40 MIN: CPT | Mod: PBBFAC | Performed by: HOSPITALIST

## 2022-12-09 PROCEDURE — 80053 COMPREHEN METABOLIC PANEL: CPT | Performed by: HOSPITALIST

## 2022-12-09 PROCEDURE — 82378 CARCINOEMBRYONIC ANTIGEN: CPT | Performed by: HOSPITALIST

## 2022-12-09 PROCEDURE — 36415 COLL VENOUS BLD VENIPUNCTURE: CPT | Performed by: HOSPITALIST

## 2022-12-09 PROCEDURE — 84466 ASSAY OF TRANSFERRIN: CPT | Performed by: HOSPITALIST

## 2022-12-09 RX ORDER — IRBESARTAN 300 MG/1
300 TABLET ORAL NIGHTLY
Qty: 90 TABLET | Refills: 3 | Status: SHIPPED | OUTPATIENT
Start: 2022-12-09 | End: 2024-01-29 | Stop reason: SDUPTHER

## 2022-12-09 NOTE — PATIENT INSTRUCTIONS
We are treating at least a stage II colon cancer based on the depth of invasion in the colon wall but no involvement in local lymph nodes. However, your CT scan yesterday is suspicious but not definitve of possible spread to the lungs. Admittedly it can be difficult to interpret the lung scan due to your multiple prior pneumonia.    Recommend possible biopsy of a lung nodule if amenable and getting an MRI of the liver to better evaluate for any spread there.    If the cancer has not spread - would recommend holding off any further cancer treatment at present.    However, if the cancer has spread, we would need to start some treatment with the goal of slowing the cancer growth and controlling the cancer.     Will have you follow up results of these tests on 12/22/22

## 2022-12-09 NOTE — ASSESSMENT & PLAN NOTE
Followed by Dr. Peguero. Recently switched from Advair to Spiriva. Remains on montelukast.    - Stable  - Con't home meds

## 2022-12-09 NOTE — ASSESSMENT & PLAN NOTE
- MR liver  - IR consult for lung biopsy to determine if she has metastatic disease  - TEMPUS xT to determine JOHAN/JUNIE/HER2 and TMB status  - FU in two weeks to determine treatment plan; if stage II would likely defer adjuvant chemo; if metastatic would consider single agent capecitabine + bevacizumab or targetted agent if available.

## 2022-12-09 NOTE — ASSESSMENT & PLAN NOTE
Recently stopped carvedilol and irbesartan. Poorly controlled today.    - Will restart irbesartan today.

## 2022-12-11 ENCOUNTER — EXTERNAL HOME HEALTH (OUTPATIENT)
Dept: HOME HEALTH SERVICES | Facility: HOSPITAL | Age: 87
End: 2022-12-11
Payer: MEDICARE

## 2022-12-14 ENCOUNTER — TELEPHONE (OUTPATIENT)
Dept: INTERNAL MEDICINE | Facility: CLINIC | Age: 87
End: 2022-12-14
Payer: MEDICARE

## 2022-12-14 RX ORDER — POTASSIUM CHLORIDE 20 MEQ/1
20 TABLET, EXTENDED RELEASE ORAL DAILY
Qty: 30 TABLET | Refills: 3 | Status: SHIPPED | OUTPATIENT
Start: 2022-12-14 | End: 2023-01-18

## 2022-12-14 NOTE — TELEPHONE ENCOUNTER
Pt called stating that her legs are still a little swollen and the shins hurt even when she touches them.

## 2022-12-14 NOTE — TELEPHONE ENCOUNTER
----- Message from Natlaie Zhang sent at 12/14/2022  8:55 AM CST -----  Contact: Pt [phone]  She said her legs are still a little swollen and the chins hurt even when she touch them.    Thank you

## 2022-12-14 NOTE — TELEPHONE ENCOUNTER
Pt informed of potassium rx sent to pharmacy . Pt is stating that she was only given 5 lasix pills and is asking if she needs more?

## 2022-12-15 RX ORDER — FUROSEMIDE 20 MG/1
TABLET ORAL
Qty: 15 TABLET | Refills: 3 | Status: SHIPPED | OUTPATIENT
Start: 2022-12-15 | End: 2023-03-01

## 2022-12-19 ENCOUNTER — HOSPITAL ENCOUNTER (OUTPATIENT)
Dept: RADIOLOGY | Facility: HOSPITAL | Age: 87
Discharge: HOME | End: 2022-12-19
Attending: HOSPITALIST
Payer: MEDICARE

## 2022-12-19 DIAGNOSIS — C18.2 MALIGNANT NEOPLASM OF ASCENDING COLON: ICD-10-CM

## 2022-12-19 PROCEDURE — 74183 MRI ABD W/O CNTR FLWD CNTR: CPT | Mod: 26,,, | Performed by: RADIOLOGY

## 2022-12-19 PROCEDURE — A9585 GADOBUTROL INJECTION: HCPCS | Performed by: HOSPITALIST

## 2022-12-19 PROCEDURE — 25500020 PHARM REV CODE 255: Performed by: HOSPITALIST

## 2022-12-19 PROCEDURE — 74183 MRI ABDOMEN W WO CONTRAST: ICD-10-PCS | Mod: 26,,, | Performed by: RADIOLOGY

## 2022-12-19 PROCEDURE — 74183 MRI ABD W/O CNTR FLWD CNTR: CPT | Mod: TC

## 2022-12-19 RX ORDER — GADOBUTROL 604.72 MG/ML
10 INJECTION INTRAVENOUS
Status: COMPLETED | OUTPATIENT
Start: 2022-12-19 | End: 2022-12-19

## 2022-12-19 RX ADMIN — GADOBUTROL 10 ML: 604.72 INJECTION INTRAVENOUS at 08:12

## 2022-12-22 ENCOUNTER — OFFICE VISIT (OUTPATIENT)
Dept: HEMATOLOGY/ONCOLOGY | Facility: CLINIC | Age: 87
End: 2022-12-22
Payer: MEDICARE

## 2022-12-22 VITALS
SYSTOLIC BLOOD PRESSURE: 150 MMHG | RESPIRATION RATE: 18 BRPM | OXYGEN SATURATION: 98 % | HEART RATE: 97 BPM | BODY MASS INDEX: 17.67 KG/M2 | WEIGHT: 106.06 LBS | DIASTOLIC BLOOD PRESSURE: 66 MMHG | HEIGHT: 65 IN | TEMPERATURE: 98 F

## 2022-12-22 DIAGNOSIS — R80.9 TYPE 2 DIABETES MELLITUS WITH MICROALBUMINURIA, WITHOUT LONG-TERM CURRENT USE OF INSULIN: ICD-10-CM

## 2022-12-22 DIAGNOSIS — I10 PRIMARY HYPERTENSION: ICD-10-CM

## 2022-12-22 DIAGNOSIS — K86.2 PANCREAS CYST: ICD-10-CM

## 2022-12-22 DIAGNOSIS — E11.29 TYPE 2 DIABETES MELLITUS WITH MICROALBUMINURIA, WITHOUT LONG-TERM CURRENT USE OF INSULIN: ICD-10-CM

## 2022-12-22 DIAGNOSIS — C18.3 MALIGNANT NEOPLASM OF HEPATIC FLEXURE: Primary | ICD-10-CM

## 2022-12-22 DIAGNOSIS — K86.89 PANCREATIC INSUFFICIENCY: ICD-10-CM

## 2022-12-22 PROBLEM — I16.0 HYPERTENSIVE URGENCY: Status: RESOLVED | Noted: 2022-07-07 | Resolved: 2022-12-22

## 2022-12-22 PROBLEM — R06.02 SOB (SHORTNESS OF BREATH) ON EXERTION: Status: RESOLVED | Noted: 2022-07-05 | Resolved: 2022-12-22

## 2022-12-22 PROBLEM — E83.39 HYPOPHOSPHATEMIA: Status: RESOLVED | Noted: 2022-10-27 | Resolved: 2022-12-22

## 2022-12-22 PROCEDURE — 99999 PR PBB SHADOW E&M-EST. PATIENT-LVL V: CPT | Mod: PBBFAC,,, | Performed by: HOSPITALIST

## 2022-12-22 PROCEDURE — 99215 OFFICE O/P EST HI 40 MIN: CPT | Mod: PBBFAC | Performed by: HOSPITALIST

## 2022-12-22 PROCEDURE — 99214 OFFICE O/P EST MOD 30 MIN: CPT | Mod: S$PBB,,, | Performed by: HOSPITALIST

## 2022-12-22 PROCEDURE — 99999 PR PBB SHADOW E&M-EST. PATIENT-LVL V: ICD-10-PCS | Mod: PBBFAC,,, | Performed by: HOSPITALIST

## 2022-12-22 PROCEDURE — 99214 PR OFFICE/OUTPT VISIT, EST, LEVL IV, 30-39 MIN: ICD-10-PCS | Mod: S$PBB,,, | Performed by: HOSPITALIST

## 2022-12-22 NOTE — Clinical Note
Domenic Rosado - I'm following Ms. Costa for new diagnoses of colon cancer. She had a recent MR of the abdomen to evaluate possible liver mets (negeative). 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 Thanks, Nakul English

## 2022-12-22 NOTE — ASSESSMENT & PLAN NOTE
Patient is awaiting IR consult for consideration of lung biopsy to determine staging. We again discussed risks and benefits of adjuvant chemotherapy for a high-risk stage II colon cancer with about 50% DFS with surgery alone. Patient accepting of risk and prefers not to pursue adjuvant chemotherapy. She would be accepting of chemtoherapy for metastatic disease.    - FU with IR for consideration of lung biopsy vs serial imaging and CEA  - FU 1-2 weeks after biopsy to review treatment plan  -

## 2022-12-22 NOTE — PATIENT INSTRUCTIONS
You continue to recover well from your recent surgery. Your recent MRI abdomen did not show any evidence of cancer in the liver or elsewhere in the abdomen. We still need to further evaluate the lung nodules to determine if they are cancerous or not. You have an upcoming appointment with the IR team on 12/28 to discuss biopsy of the the lung nodules.    Presuming the lungs do not have cancer, you would have a stage II colon cancer. Certain features suggest it could be at relatively higher risk of coming back. We discussed that chemotherapy could potentially reduce this risk of the cancer coming back but no eliminate it. You decided that additional chemotherapy at this time would not be worth the risk of treatment, which is a reasonable decision. Should you decide that you would accept the risk of chemotherapy we could arrange for a 6 month course of capecitabine chemotherapy to reduce that risk.     Otherwise, if the lungs do have cancer, we would need to start treatment to slow the growth of the cancer.    - Follow up with IR on 12/28/22 to arrange possibly biopsy  - FU with Dr. English on 1/19/23 to review any biopsy results  - Continue with irbesartan and lasix 20mg every other day; monitor for worsening symptoms, and could consider ultrasound if worse  - I will reach out to Dr. Rosado about the pancreatic cyst

## 2022-12-27 ENCOUNTER — OFFICE VISIT (OUTPATIENT)
Dept: OTOLARYNGOLOGY | Facility: CLINIC | Age: 87
End: 2022-12-27
Payer: MEDICARE

## 2022-12-27 VITALS
BODY MASS INDEX: 17.46 KG/M2 | SYSTOLIC BLOOD PRESSURE: 164 MMHG | HEIGHT: 65 IN | WEIGHT: 104.81 LBS | HEART RATE: 98 BPM | DIASTOLIC BLOOD PRESSURE: 72 MMHG

## 2022-12-27 DIAGNOSIS — J30.89 NON-SEASONAL ALLERGIC RHINITIS, UNSPECIFIED TRIGGER: Chronic | ICD-10-CM

## 2022-12-27 DIAGNOSIS — J33.8 POLYP OF PARANASAL SINUS: Chronic | ICD-10-CM

## 2022-12-27 DIAGNOSIS — J32.8 OTHER CHRONIC SINUSITIS: Primary | Chronic | ICD-10-CM

## 2022-12-27 PROCEDURE — 99999 PR PBB SHADOW E&M-EST. PATIENT-LVL V: CPT | Mod: PBBFAC,,, | Performed by: OTOLARYNGOLOGY

## 2022-12-27 PROCEDURE — 99214 OFFICE O/P EST MOD 30 MIN: CPT | Mod: 25,S$PBB,, | Performed by: OTOLARYNGOLOGY

## 2022-12-27 PROCEDURE — 99214 PR OFFICE/OUTPT VISIT, EST, LEVL IV, 30-39 MIN: ICD-10-PCS | Mod: 25,S$PBB,, | Performed by: OTOLARYNGOLOGY

## 2022-12-27 PROCEDURE — 99215 OFFICE O/P EST HI 40 MIN: CPT | Mod: PBBFAC,PN | Performed by: OTOLARYNGOLOGY

## 2022-12-27 PROCEDURE — 99999 PR PBB SHADOW E&M-EST. PATIENT-LVL V: ICD-10-PCS | Mod: PBBFAC,,, | Performed by: OTOLARYNGOLOGY

## 2022-12-27 NOTE — PROGRESS NOTES
"CC: follow up CRS    Subjective:      Sussy Costa is a 87 y.o. female who comes for follow-up for chronic rhinosinusitis with nasal polyposis(AERD) status-post revision endoscopic sinus surgery remotely. She reports she has been doing well regarding her sinus symptoms but has recently noticed that her nose has been more dry and with flaky crusts.  She thinks it has been worse with colder weather.  She states that she has not been able to do her nasal saline rinses daily due to recent hospitalization. She did have oxygen via nasal cannula   She denies rhinorrhea, post-nasal drip, facial pain or pressure. She has had some blood tinged mucus with nose blowing. No brittany epistaxis. Will be going for biopsy of lung nodule with interventional radiology in the near future.     Objective:     BP (!) 164/72 (BP Location: Right arm, Patient Position: Sitting, BP Method: Large (Automatic))   Pulse 98   Ht 5' 5" (1.651 m)   Wt 47.6 kg (104 lb 13.3 oz)   BMI 17.44 kg/m²      Constitutional: Well appearing / communicating.  NAD.  Eyes: EOM I Bilaterally  Head/Face: Normocephalic.  Negative paranasal sinus pressure/tenderness.  Salivary glands WNL.  House Brackmann I Bilaterally.    Right Ear: External Auditory Canal WNL,TM w/o masses/lesions/perforations.  Auricle WNL.  Left Ear: External Auditory Canal WNL,TM w/o masses/lesions/perforations. Auricle WNL.  Nose: No gross nasal septal deviation. Inferior Turbinates 3+ bilaterally. No septal perforation. No masses/lesions. External nasal skin without masses/lesions.  Oral Cavity: Gingiva/lips WNL.  FOM Soft, no masses palpated. Oral Tongue mobile. Hard Palate WNL.   Oropharynx: BOT WNL. No masses/lesions noted. Tonsillar fossa/pharyngeal wall without lesions. Posterior oropharynx WNL.  Soft palate without masses. Midline uvula.   Neck/Lymphatic: No LAD I-VI bilaterally.  No thyromegaly.  No masses noted on exam.      Procedure    Nasal endoscopy performed.  See procedure " note.        Data Reviewed    Pathology report indicated chronic inflammation with eosinophilia.       Assessment:     1. Other chronic sinusitis    2. Non-seasonal allergic rhinitis, unspecified trigger    3. Polyp of paranasal sinus           Plan:     May use nasal saline gel/Ponaris to moisturize her nasal cavity.   Consider addition of humidifier during winter months.   Continue budesonide and mupirocin nasal nebulized solution and nasal saline rinses(refills provided).   Continue Singulair and Xyzal.       Follow up in about 6 months (around 6/27/2023).     Rosangela Isabel MD

## 2022-12-27 NOTE — PROCEDURES
Procedures     PROCEDURE NOTE:  Nasal endoscopy   Preprocedure diagnosis:  Chronic sinusitis with polyposis  Postprocedure diangosis:  Same  Complications:  None  Blood Loss:  None    Procedure in detail:  After verbal consent was obtained, the patient's nasal cavity was anesthesized using topical 1%lidocaine and Neosynepherine.  A rigid 0 degree endoscope was placed in first the right, then the left nasal cavity.  The inferior and middle turbinates were examined, and found to be boggy with edema bilaterally.   The middle meatus and maxillary antrum was also examined, and found to be patent bilaterally. Sphenoidotomy patent bilaterally. No evidence of brittany polyps.  No purulent drainage or masses seen. Significant crusting noted and debrided.   The patient tolerated the procedure well and there were no complications.

## 2022-12-27 NOTE — PATIENT INSTRUCTIONS
May use nasal saline gel/Ponaris to moisturize  nasal cavity BID  Consider addition of humidifier at bedside at night during winter months.

## 2022-12-28 ENCOUNTER — OFFICE VISIT (OUTPATIENT)
Dept: INTERVENTIONAL RADIOLOGY/VASCULAR | Facility: CLINIC | Age: 87
End: 2022-12-28
Payer: MEDICARE

## 2022-12-28 ENCOUNTER — LAB VISIT (OUTPATIENT)
Dept: LAB | Facility: HOSPITAL | Age: 87
End: 2022-12-28
Payer: MEDICARE

## 2022-12-28 VITALS
BODY MASS INDEX: 18.16 KG/M2 | HEIGHT: 65 IN | DIASTOLIC BLOOD PRESSURE: 68 MMHG | HEART RATE: 85 BPM | SYSTOLIC BLOOD PRESSURE: 151 MMHG | WEIGHT: 109 LBS

## 2022-12-28 DIAGNOSIS — D49.9 NEOPLASM: ICD-10-CM

## 2022-12-28 DIAGNOSIS — R91.8 LUNG MASS: ICD-10-CM

## 2022-12-28 DIAGNOSIS — R91.8 LUNG MASS: Primary | ICD-10-CM

## 2022-12-28 LAB
BASOPHILS # BLD AUTO: 0.07 K/UL (ref 0–0.2)
BASOPHILS NFR BLD: 0.7 % (ref 0–1.9)
DIFFERENTIAL METHOD: ABNORMAL
EOSINOPHIL # BLD AUTO: 2.4 K/UL (ref 0–0.5)
EOSINOPHIL NFR BLD: 24.2 % (ref 0–8)
ERYTHROCYTE [DISTWIDTH] IN BLOOD BY AUTOMATED COUNT: 15.9 % (ref 11.5–14.5)
HCT VFR BLD AUTO: 32.3 % (ref 37–48.5)
HGB BLD-MCNC: 10 G/DL (ref 12–16)
IMM GRANULOCYTES # BLD AUTO: 0.02 K/UL (ref 0–0.04)
IMM GRANULOCYTES NFR BLD AUTO: 0.2 % (ref 0–0.5)
INR PPP: 1.1 (ref 0.8–1.2)
LYMPHOCYTES # BLD AUTO: 2.4 K/UL (ref 1–4.8)
LYMPHOCYTES NFR BLD: 24.6 % (ref 18–48)
MCH RBC QN AUTO: 28.9 PG (ref 27–31)
MCHC RBC AUTO-ENTMCNC: 31 G/DL (ref 32–36)
MCV RBC AUTO: 93 FL (ref 82–98)
MONOCYTES # BLD AUTO: 0.9 K/UL (ref 0.3–1)
MONOCYTES NFR BLD: 8.9 % (ref 4–15)
NEUTROPHILS # BLD AUTO: 4 K/UL (ref 1.8–7.7)
NEUTROPHILS NFR BLD: 41.4 % (ref 38–73)
NRBC BLD-RTO: 0 /100 WBC
OVALOCYTES BLD QL SMEAR: ABNORMAL
PLATELET # BLD AUTO: 310 K/UL (ref 150–450)
PLATELET BLD QL SMEAR: ABNORMAL
PMV BLD AUTO: 12 FL (ref 9.2–12.9)
POIKILOCYTOSIS BLD QL SMEAR: SLIGHT
PROTHROMBIN TIME: 11.7 SEC (ref 9–12.5)
RBC # BLD AUTO: 3.46 M/UL (ref 4–5.4)
WBC # BLD AUTO: 9.74 K/UL (ref 3.9–12.7)

## 2022-12-28 PROCEDURE — 99214 OFFICE O/P EST MOD 30 MIN: CPT | Mod: S$PBB,,,

## 2022-12-28 PROCEDURE — 99214 PR OFFICE/OUTPT VISIT, EST, LEVL IV, 30-39 MIN: ICD-10-PCS | Mod: S$PBB,,,

## 2022-12-28 PROCEDURE — 85610 PROTHROMBIN TIME: CPT

## 2022-12-28 PROCEDURE — 99999 PR PBB SHADOW E&M-EST. PATIENT-LVL IV: ICD-10-PCS | Mod: PBBFAC,,,

## 2022-12-28 PROCEDURE — 85025 COMPLETE CBC W/AUTO DIFF WBC: CPT

## 2022-12-28 PROCEDURE — 99214 OFFICE O/P EST MOD 30 MIN: CPT | Mod: PBBFAC

## 2022-12-28 PROCEDURE — 99999 PR PBB SHADOW E&M-EST. PATIENT-LVL IV: CPT | Mod: PBBFAC,,,

## 2022-12-28 PROCEDURE — 36415 COLL VENOUS BLD VENIPUNCTURE: CPT

## 2022-12-28 NOTE — LETTER
December 28, 2022    Sussy Costa  [address] [address]       [facility]  [address]  [facility] [address]  Phone: [phone] PRE-PROCEDURE INSTRUCTIONS    Your procedure with Interventional Radiology is scheduled for 1/24/2023. Please arrive by 8:00am .    **Do not eat or drink anything between midnight and the time of your procedure. This includes gum, mints, and candy lemon drops.    **Do not smoke or drink alcoholic beverages 24 hours prior to your procedure.    **If you wear contact lenses, dentures, hearing aids, or glasses, bring a container to put them in during the procedure and give them to a family member for safekeeping.    **If you have been diagnosed with sleep apnea please bring your CPAP machine.    **If your doctor has scheduled you for an overnight stay, bring a small overnight bag with any personal items that you may need.    **Make arrangements in advance for transportation home by a responsible adult. It is not safe to drive a vehicle during the 24 hours following the procedure.    **All [facility] facilities and properties are tobacco free. Smoking is NOT allowed.    PLEASE NOTE: The procedure schedule has many variables which affect the time of your procedure. Family members should be available if your surgery time changes.    If you have any questions about these instructions call Interventional Radiology at [phone] Monday - Friday between 8:00am and 4:00pm or [phone] (ask for interventional radiology resident) for after hours.

## 2022-12-28 NOTE — PROGRESS NOTES
Subjective:       Patient ID: Sussy Costa is a 87 y.o. female.    Chief Complaint: Lung Mass    Referral by Dr. English to discuss right lung biopsy.   Sussy Costa is a 87 y.o. female who was hospitalized 10/2022 for large bowel obstruction. She underwent urgent right hemicolectomy on 10/25/2022 which was positive for colon adenocarcinoma. CT chest 12/8/22 concerning for multiple new lung nodules. Patient reports that overall she has been doing well. Slowly gaining her appetite back and gaining some weight. She has no complaints at this time and is interested in scheduling the biopsy.     No blood thinners, or fish oil products.   No CPAP, O2 use, or issues laying flat.     PMH and Medications reviewed.     Review of Systems   Constitutional:  Positive for appetite change and unexpected weight change. Negative for fever.   HENT:  Negative for ear discharge, facial swelling and rhinorrhea (dry nose (has seen ENT)).    Eyes:  Negative for pain and itching.   Respiratory:  Positive for shortness of breath (chronic, occational, history of asthma). Negative for cough.    Cardiovascular:  Positive for leg swelling (improved with compression stockings and rest). Negative for chest pain.   Gastrointestinal:  Positive for abdominal pain (LUQ pain relieved with flatulence). Negative for abdominal distention, change in bowel habit, nausea, vomiting and change in bowel habit.   Genitourinary:  Negative for difficulty urinating and dysuria.   Neurological:  Negative for dizziness and headaches.   Psychiatric/Behavioral:  Negative for behavioral problems and confusion.        Objective:      Physical Exam  Constitutional:       General: She is not in acute distress.     Appearance: Normal appearance.   HENT:      Head: Normocephalic and atraumatic.      Nose: Nose normal.   Eyes:      Conjunctiva/sclera: Conjunctivae normal.   Cardiovascular:      Rate and Rhythm: Normal rate and regular rhythm.      Pulses: Normal  pulses.      Heart sounds: Normal heart sounds.   Pulmonary:      Effort: Pulmonary effort is normal.      Breath sounds: Normal breath sounds.   Abdominal:      General: Abdomen is flat.      Palpations: Abdomen is soft.   Musculoskeletal:      Right lower leg: Edema present.      Left lower leg: Edema present.   Lymphadenopathy:      Cervical: No cervical adenopathy.   Neurological:      General: No focal deficit present.      Mental Status: She is alert.      Sensory: No sensory deficit.      Motor: No weakness.   Psychiatric:         Mood and Affect: Mood normal.         Behavior: Behavior normal.       CT Chest 12/8/2022    Assessment and Plan       Lung mass  -     IR Biopsy Lung w/ guidance; Future; Expected date: 12/28/2022  -     CBC Auto Differential; Future; Expected date: 12/28/2022  -     Protime-INR; Future; Expected date: 12/28/2022    Neoplasm  -     Protime-INR; Future; Expected date: 12/28/2022      Referred by Dr. Nakul English for right lung biopsy  Approved by Dr. Winn.   CBC and INR today.     Discussed how the procedure will be performed, risks (including, but not limited to, pain, bleeding, infection, damage to nearby structures, the need for additional procedures, and pneumothorax), benefits, possible complications, pre-post procedure expectations, and alternatives. The patient voices understanding and all questions have been answered.  The patient agrees to proceed as planned. Dr. Carbajal in room. Written informed consent obtained. Patient scheduled for 1/24/2022. Pre-procedure handout with clinic phone number provided.    Monica Parrish PA-C  Interventional Radiology  [phone]

## 2023-01-04 NOTE — PROGRESS NOTES
Subjective:      Patient ID: Sussy Costa is a 87 y.o. female.    Chief Complaint:  No chief complaint on file.    History of Present Illness  Sussy Costa is here for follow up of DM and Osteoporosis.  Previously seen by GOVIND Quick NP.  Last seen 7/26/2022.  This is their first visit with me.      With regards to diabetes:     Latest Reference Range & Units 03/08/22 10:13   Glucose 70 - 110 mg/dL 207 (H)   C-Peptide 0.78 - 5.19 ng/mL 0.77 (L)   (H): Data is abnormally high  (L): Data is abnormally low  DE: 8/2021  Known complications:  DKA Denies   RN Denies  Eye Exam: needs to make an appointment   PN Yes  Podiatry: 2/2021  Nephropathy Denies  CAD Denies  Reports history of pancreatitis - on Creon.     Current regimen:  Levemir 3 units twice daily   Novolog 5-5-5 units before meals   Add correction scale as needed.  Blood sugar 150 to 200 add 1 units  Blood sugar 201 to 250 add 2 units  Blood sugar 251 to 300 add 3 units  Blood sugar 301 to 350 add 4 units  Blood sugar greater than 350 add 5 units      Reports compliance.     Other medications tried:  None.     Glucose Monitor:   4 times a day testing  Log reviewed: log provided and reviewed, scanned in media tab    Hypoglycemia:  Yes - reports the timing varies.   Knows how to correct with 15 grams of carbs- juice, coke, or a peppermint.     Diet/Exercise:  Eats 3 meals a day.   Snacks : not always   Drinks : water, soft drinks   Exercise - tries to stay active       Diabetes Management Status    Hemoglobin A1C   Date Value Ref Range Status   11/30/2022 6.9 (H) 4.0 - 5.6 % Final     Comment:     ADA Screening Guidelines:  5.7-6.4%  Consistent with prediabetes  >or=6.5%  Consistent with diabetes    High levels of fetal hemoglobin interfere with the HbA1C  assay. Heterozygous hemoglobin variants (HbS, HgC, etc)do  not significantly interfere with this assay.   However, presence of multiple variants may affect accuracy.     07/05/2022 8.2 (H) 4.0 - 5.6 % Final      Comment:     ADA Screening Guidelines:  5.7-6.4%  Consistent with prediabetes  >or=6.5%  Consistent with diabetes    High levels of fetal hemoglobin interfere with the HbA1C  assay. Heterozygous hemoglobin variants (HbS, HgC, etc)do  not significantly interfere with this assay.   However, presence of multiple variants may affect accuracy.     03/08/2022 9.7 (H) 4.0 - 5.6 % Final     Comment:     ADA Screening Guidelines:  5.7-6.4%  Consistent with prediabetes  >or=6.5%  Consistent with diabetes    High levels of fetal hemoglobin interfere with the HbA1C  assay. Heterozygous hemoglobin variants (HbS, HgC, etc)do  not significantly interfere with this assay.   However, presence of multiple variants may affect accuracy.         Statin: Taking  ACE/ARB: Taking  Screening or Prevention Patient's value Goal Complete/Controlled?   HgA1C Testing and Control   Lab Results   Component Value Date    HGBA1C 6.9 (H) 11/30/2022      Annually/Less than 8% Yes     Lipid profile : 07/05/2022 Annually Yes     LDL control Lab Results   Component Value Date    LDLCALC 58.4 (L) 07/05/2022    Annually/Less than 100 mg/dl  Yes     Nephropathy screening Lab Results   Component Value Date    LABMICR 15.0 07/26/2019     Lab Results   Component Value Date    PROTEINUA Negative 10/24/2022    Annually Yes     Blood pressure BP Readings from Last 1 Encounters:   01/09/23 110/61    Less than 140/90 No     Dilated retinal exam : 12/17/2021 Annually No     Foot exam   Most Recent Foot Exam Date: Not Found Annually Yes       With regards to osteoporosis:     First diagnosed with osteoporosis in 2007.    BMD:   11/5/2021  39% risk of a major osteoporotic fracture in the next 10 years.     30% risk of hip fracture in the next 10 years.     Impression:     *Osteoporosis on treatment with Boniva  *Fracture risk is very high due to FRAX >30% or 4.5%  *Compared with previous DXA, BMD at the lumbar spine has remained stable, and the BMD at the total  hip has declined     RECOMMENDATIONS:  *Daily calcium intake 9114-0201 mg, dietary sources preferred; Vitamin D 7942-5902 IU daily.  *Weight bearing exercise and fall precautions.  *At it is unusual to lose bone while on bisphosphonate therapy investigate for non adherence, malabsorption, other secondary causes of bone loss and considers changing to parenteral therapy  *Repeat BMD in 2 years    Medications:   Actonel 8/2018 - 12/2021  She was given first dose of Prolia in 2/2018 and developed joint pains and lower ext edema so it was decided that she would not receive another dose of Prolia. We decided against Reclast for the same reason and instead restarted her on an oral bisphosphonate which she has tolerated without side effects. After her last visit 12/2021 we decided to stop the oral bisphosphonate and try Prolia again. She received Prolia  in 1/2022 without any side effects.  Last Dose: 8/11/2022    Calcium intake: dietary sources   Vit D intake: OTC Vit D3 1000iu daily     Weight bearing exercise: walking   Falls: Denies  Fractures: Denies   Significant height loss (>2 inches): ~ 1/2  inch     Family history: Denies    Menopause: 56y.o.  No HRT.    Tobacco Use: Denies  Alcohol Use: Denies  Glucocorticoid History: Prednisone over the years for asthma.   Anticoagulant Use: Denies  GERD/PPI Use: Denies  History of Malabsorption: Yes  Antiseizure Medications: Denies  History of Thyroid Disease: Denies  Kidney Stones/ Kidney Disease: Denies  Personal history of kidney stones: Denies  Family history of kidney stones: Denies  Family history of bone disease or fracture: Denies    Denies point tenderness along spine  Denies bilateral hip tenderness  Gait - steady without the use of assistive device     Lab Results   Component Value Date    CALCIUM 10.3 01/06/2023    PHOS 1.8 (L) 11/02/2022     Vit D, 25-Hydroxy   Date Value Ref Range Status   03/08/2022 30 30 - 96 ng/mL Final     Comment:     Vitamin D  "deficiency.........<10 ng/mL                              Vitamin D insufficiency......10-29 ng/mL       Vitamin D sufficiency........> or equal to 30 ng/mL  Vitamin D toxicity............>100 ng/mL         Review of Systems  As above    Objective:   Physical Exam  Vitals reviewed.   Cardiovascular:      Rate and Rhythm: Normal rate.      Comments: No edema present  Pulmonary:      Effort: Pulmonary effort is normal.   Abdominal:      Palpations: Abdomen is soft.       Visit Vitals  /61   Pulse 66   Ht 5' 5" (1.651 m)   Wt 46.3 kg (102 lb 1.2 oz)   SpO2 99%   BMI 16.99 kg/m²       Body mass index is 16.99 kg/m².    Lab Review:   Lab Results   Component Value Date    HGBA1C 6.9 (H) 11/30/2022    HGBA1C 8.2 (H) 07/05/2022    HGBA1C 9.7 (H) 03/08/2022       Lab Results   Component Value Date    CHOL 122 07/05/2022    HDL 54 07/05/2022    LDLCALC 58.4 (L) 07/05/2022    TRIG 48 07/05/2022    CHOLHDL 44.3 07/05/2022     Lab Results   Component Value Date     01/06/2023    K 4.7 01/06/2023     01/06/2023    CO2 28 01/06/2023    GLU 92 01/06/2023    BUN 18 01/06/2023    CREATININE 0.8 01/06/2023    CALCIUM 10.3 01/06/2023    PROT 6.8 01/06/2023    ALBUMIN 3.1 (L) 01/06/2023    BILITOT 0.5 01/06/2023    ALKPHOS 62 01/06/2023    AST 17 01/06/2023    ALT 12 01/06/2023    ANIONGAP 6 (L) 01/06/2023    ESTGFRAFRICA >60.0 07/11/2022    EGFRNONAA >60.0 07/11/2022    TSH 2.897 07/05/2022     Vit D, 25-Hydroxy   Date Value Ref Range Status   03/08/2022 30 30 - 96 ng/mL Final     Comment:     Vitamin D deficiency.........<10 ng/mL                              Vitamin D insufficiency......10-29 ng/mL       Vitamin D sufficiency........> or equal to 30 ng/mL  Vitamin D toxicity............>100 ng/mL       Assessment and Plan     1. Type 2 diabetes mellitus with microalbuminuria, without long-term current use of insulin  GLUCOSE MONITORING CONTINUOUS MIN 72 HOURS    Hemoglobin A1C    Comprehensive Metabolic Panel    "   2. Senile osteoporosis  Vitamin D          Type 2 diabetes mellitus with microalbuminuria, without long-term current use of insulin  -- Labs prior to follow up.  -- History of chronic pancreatitis. Low C peptide - manage as T1DM.  -- Brittle diabetic.  -- A1c goal < or = 8%.  -- Medications discussed:  Insulin   -- Reviewed logs/CGM:  Glucose variable.  Most concerning is hypoglycemia.  Not interested in personal CGM.  Agreeable to professional CGM.  -- Medication Changes:   Levemir 3 units twice daily   Novolog 5-5-5 units before meals   Add correction scale if needed.  Blood sugar 180 to 230 add 1 units  Blood sugar 231 to 280 add 2 units  Blood sugar 281 to 330 add 3 units  Blood sugar 331 to 380 add 4 units  Blood sugar greater than 380 add 5 units  -- Reviewed goals of therapy are to get the best control we can without hypoglycemia.  -- Reviewed patient's current insulin regimen. Clarified proper insulin dose and timing in relation to meals, etc. Insulin injection sites and proper rotation instructed.    -- Advised frequent self blood glucose monitoring.  Patient encouraged to document glucose results and bring them to every clinic visit.  -- Hypoglycemia precautions discussed. Instructed on precautions before driving.    -- Call for Bg repeatedly < 90 or > 180.   -- Close adherence to lifestyle changes recommended.   -- Periodic follow ups for eye evaluations, foot care and dental care suggested.    Senile osteoporosis  -- Risks include low weight,  race, menopause, prior chronic glucocorticoid use.  -- Reviewed basics of quantity versus quality.  -- Reassuring they are not fracturing.  -- Recommend:  -Pharmacological therapy is recommended for patients with osteopenia if the 10 year probability of a hip fracture is >3% and 10 year probability of other major osteoporotic fractures is >20%.  Treatment options and potential side effects discussed for PO bisphosphonates, reclast, Denosumab, and  Teriparatide.   -Treatment:   Actonel 8/2018 - 12/2021  She was given first dose of Prolia in 2/2018 and developed joint pains and lower ext edema so it was decided that she would not receive another dose of Prolia. We decided against Reclast for the same reason and instead restarted her on an oral bisphosphonate which she has tolerated without side effects. After her last visit 12/2021 we decided to stop the oral bisphosphonate and try Prolia again. She received Prolia  in 1/2022 without any side effects.  Last Dose: 8/11/2022  Continue Prolia every 6 months.  -Calcium and Vitamin D RDD provided.  -Exercise: recommended..  -Fall precautions made in the home.  -Alerted that if dental work needs to be done it should be done prior to initiating therapy. Dental health: UTD.  -- Repeat DEXA scan 11/2023.      Follow up in about 4 months (around 5/9/2023).

## 2023-01-06 ENCOUNTER — OFFICE VISIT (OUTPATIENT)
Dept: HEMATOLOGY/ONCOLOGY | Facility: CLINIC | Age: 88
End: 2023-01-06
Payer: MEDICARE

## 2023-01-06 ENCOUNTER — LAB VISIT (OUTPATIENT)
Dept: LAB | Facility: HOSPITAL | Age: 88
End: 2023-01-06
Attending: HOSPITALIST
Payer: MEDICARE

## 2023-01-06 ENCOUNTER — TELEPHONE (OUTPATIENT)
Dept: HEMATOLOGY/ONCOLOGY | Facility: CLINIC | Age: 88
End: 2023-01-06

## 2023-01-06 VITALS
WEIGHT: 104.06 LBS | HEART RATE: 85 BPM | TEMPERATURE: 97 F | DIASTOLIC BLOOD PRESSURE: 78 MMHG | RESPIRATION RATE: 18 BRPM | HEIGHT: 65 IN | BODY MASS INDEX: 17.34 KG/M2 | SYSTOLIC BLOOD PRESSURE: 194 MMHG | OXYGEN SATURATION: 94 %

## 2023-01-06 DIAGNOSIS — E11.29 TYPE 2 DIABETES MELLITUS WITH MICROALBUMINURIA, WITHOUT LONG-TERM CURRENT USE OF INSULIN: ICD-10-CM

## 2023-01-06 DIAGNOSIS — C18.3 MALIGNANT NEOPLASM OF HEPATIC FLEXURE: Primary | ICD-10-CM

## 2023-01-06 DIAGNOSIS — I10 PRIMARY HYPERTENSION: ICD-10-CM

## 2023-01-06 DIAGNOSIS — J45.40 MODERATE PERSISTENT ASTHMA WITHOUT COMPLICATION: ICD-10-CM

## 2023-01-06 DIAGNOSIS — R60.0 LOWER EXTREMITY EDEMA: ICD-10-CM

## 2023-01-06 DIAGNOSIS — R80.9 TYPE 2 DIABETES MELLITUS WITH MICROALBUMINURIA, WITHOUT LONG-TERM CURRENT USE OF INSULIN: ICD-10-CM

## 2023-01-06 DIAGNOSIS — C18.2 MALIGNANT NEOPLASM OF ASCENDING COLON: ICD-10-CM

## 2023-01-06 LAB
ALBUMIN SERPL BCP-MCNC: 3.1 G/DL (ref 3.5–5.2)
ALP SERPL-CCNC: 62 U/L (ref 55–135)
ALT SERPL W/O P-5'-P-CCNC: 12 U/L (ref 10–44)
ANION GAP SERPL CALC-SCNC: 6 MMOL/L (ref 8–16)
AST SERPL-CCNC: 17 U/L (ref 10–40)
BILIRUB SERPL-MCNC: 0.5 MG/DL (ref 0.1–1)
BUN SERPL-MCNC: 18 MG/DL (ref 8–23)
CALCIUM SERPL-MCNC: 10.3 MG/DL (ref 8.7–10.5)
CEA SERPL-MCNC: 2.7 NG/ML (ref 0–5)
CHLORIDE SERPL-SCNC: 106 MMOL/L (ref 95–110)
CO2 SERPL-SCNC: 28 MMOL/L (ref 23–29)
CREAT SERPL-MCNC: 0.8 MG/DL (ref 0.5–1.4)
ERYTHROCYTE [DISTWIDTH] IN BLOOD BY AUTOMATED COUNT: 15.5 % (ref 11.5–14.5)
EST. GFR  (NO RACE VARIABLE): >60 ML/MIN/1.73 M^2
GLUCOSE SERPL-MCNC: 92 MG/DL (ref 70–110)
HCT VFR BLD AUTO: 34 % (ref 37–48.5)
HGB BLD-MCNC: 10.5 G/DL (ref 12–16)
IMM GRANULOCYTES # BLD AUTO: 0.01 K/UL (ref 0–0.04)
MCH RBC QN AUTO: 28.4 PG (ref 27–31)
MCHC RBC AUTO-ENTMCNC: 30.9 G/DL (ref 32–36)
MCV RBC AUTO: 92 FL (ref 82–98)
NEUTROPHILS # BLD AUTO: 3.3 K/UL (ref 1.8–7.7)
PLATELET # BLD AUTO: 245 K/UL (ref 150–450)
PMV BLD AUTO: 11.5 FL (ref 9.2–12.9)
POTASSIUM SERPL-SCNC: 4.7 MMOL/L (ref 3.5–5.1)
PROT SERPL-MCNC: 6.8 G/DL (ref 6–8.4)
RBC # BLD AUTO: 3.7 M/UL (ref 4–5.4)
SODIUM SERPL-SCNC: 140 MMOL/L (ref 136–145)
WBC # BLD AUTO: 8.71 K/UL (ref 3.9–12.7)

## 2023-01-06 PROCEDURE — 99215 PR OFFICE/OUTPT VISIT, EST, LEVL V, 40-54 MIN: ICD-10-PCS | Mod: S$PBB,,, | Performed by: HOSPITALIST

## 2023-01-06 PROCEDURE — 99214 OFFICE O/P EST MOD 30 MIN: CPT | Mod: PBBFAC | Performed by: HOSPITALIST

## 2023-01-06 PROCEDURE — 36415 COLL VENOUS BLD VENIPUNCTURE: CPT | Performed by: HOSPITALIST

## 2023-01-06 PROCEDURE — 99999 PR PBB SHADOW E&M-EST. PATIENT-LVL IV: ICD-10-PCS | Mod: PBBFAC,,, | Performed by: HOSPITALIST

## 2023-01-06 PROCEDURE — 85027 COMPLETE CBC AUTOMATED: CPT | Performed by: HOSPITALIST

## 2023-01-06 PROCEDURE — 99215 OFFICE O/P EST HI 40 MIN: CPT | Mod: S$PBB,,, | Performed by: HOSPITALIST

## 2023-01-06 PROCEDURE — 82378 CARCINOEMBRYONIC ANTIGEN: CPT | Performed by: HOSPITALIST

## 2023-01-06 PROCEDURE — 80053 COMPREHEN METABOLIC PANEL: CPT | Performed by: HOSPITALIST

## 2023-01-06 PROCEDURE — 99999 PR PBB SHADOW E&M-EST. PATIENT-LVL IV: CPT | Mod: PBBFAC,,, | Performed by: HOSPITALIST

## 2023-01-06 RX ORDER — CHLORTHALIDONE 25 MG/1
25 TABLET ORAL DAILY
Qty: 30 TABLET | Refills: 11 | Status: SHIPPED | OUTPATIENT
Start: 2023-01-06 | End: 2023-11-10 | Stop reason: SDUPTHER

## 2023-01-06 NOTE — ASSESSMENT & PLAN NOTE
- Con't lasix 20mg every other day  - Add chlorthalidone given poor blood pressure control  - Schedule bilateral leg US

## 2023-01-06 NOTE — ASSESSMENT & PLAN NOTE
Patient awaiting biopys of lung nodule scheduled for 1/23/23  - Follow up in clinic on 2/3/23 to review results

## 2023-01-06 NOTE — PROGRESS NOTES
MEDICAL ONCOLOGY FOLLOW-UP VISIT.     Best Contact Phone Number(s): [phone] (home)      Cancer/Stage/TNM:    Cancer Staging   No matching staging information was found for the patient.     Reason for visit: Sussy Costa is a 87 y.o. female with asthma, recurrent PNA and hypoxic respiratory failure, and DM2 who was hospitalized 10/2022 for large bowel obstruction. 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 CT chest 12/8/22 concerning for multiple new lung nodules. She presents to medical oncology clinic for routine follow up awaiting lung biopsy later this month.      Interval History:     Patient without any signficant new concerns. She continues to have leg swelling and burning with associated redness L>R. Has had some improvement with furosemide 20mg every other day. Preivously no resposne to abx. No chest pain. Mild dyspnea on exertion, can walk up to 1/4 a mile without stopping. No cough. No orthopnea.  Has mild low back pain that is stable.     Also has ongoing abdominal cramping throughout the week, usually after large meals. Appetite is good. Weight generally stable. No N/V. Normal BM without blood.        Oncology History   Colon cancer   10/25/2022 Surgery    Right hemicolectomy    Moderately differentiated MMR proficient adenocarcinoma invading into the visceral peritoneum with associated PNI, LVI, and high tumor budding. Negative margins. 0/18 LN. [id number]     10/25/2022 Imaging Significant Findings    CT a/p with contrast:     1. Circumferential wall thickening of the short segment of ascending colon and apple-core appearance with pericolic fat stranding and free fluid, resulting in significant mass effect and dilatation of the cecum up to measuring 10 cm.  Suspect obstructing ascending colon neoplasm.    2. Diffuse gastric wall thickening and enhancement suggesting gastritis.  3. Stable innumerable  "pancreatic hypodense lesion.  4. Pulmonary nodules similar in size and number in comparison prior.  Metastasis not excluded.  5. Multiple hypodense lesions in the liver and a few in the spleen as well, similar to prior.  Metastasis not excluded.       12/7/2022 Imaging Significant Findings    CT chest:    1. Following resolution of left inter lobar and lower lobe bronchi mucous plugging, there is resolution of previous left lower lobe atelectasis.  However, there are many bilateral subcentimeter pulmonary nodules in both lower lobes which appear increased since a CT of the abdomen and pelvis 10/25/2022 in which there is partial imaging of the chest base.  There is a new 1.4 cm right lower lobe nodule.  2. Interval development of small volume right-sided pleural effusion.  This report was flagged in Epic as abnormal.     12/19/2022 Imaging Significant Findings    MR abdomen without evidence of intrabdominal malignancy. Ongoing chronic pancreatitis            Physical Exam:   BP (!) 194/78 (BP Location: Left arm, Patient Position: Sitting, BP Method: Medium (Automatic))   Pulse 85   Temp 96.6 °F (35.9 °C) (Oral)   Resp 18   Ht 5' 5" (1.651 m)   Wt 47.2 kg (104 lb 0.9 oz)   SpO2 (!) 94%   BMI 17.32 kg/m²      ECOG Performance Status: (foot note - ECOG PS provided by [employer]cology [employer]) 1 - Symptomatic but completely ambulatory    Physical Exam  Constitutional:       General: She is not in acute distress.     Appearance: She is normal weight.   HENT:      Head: Normocephalic.      Mouth/Throat:      Mouth: Mucous membranes are moist.      Pharynx: Oropharynx is clear.   Eyes:      General: No scleral icterus.     Conjunctiva/sclera: Conjunctivae normal.      Pupils: Pupils are equal, round, and reactive to light.   Cardiovascular:      Rate and Rhythm: Normal rate and regular rhythm.      Heart sounds: No murmur heard.  Pulmonary:      Effort: Pulmonary effort is normal. No respiratory distress. "      Breath sounds: Normal breath sounds.   Abdominal:      General: There is no distension.      Palpations: Abdomen is soft.   Musculoskeletal:         General: Normal range of motion.      Cervical back: Normal range of motion and neck supple.      Right lower leg: Edema (1+ BLE edema group home to knees with associated reticular erythema) present.      Left lower leg: Edema present.   Lymphadenopathy:      Cervical: No cervical adenopathy.   Skin:     General: Skin is warm.      Coloration: Skin is not jaundiced.      Findings: No bruising or rash.   Neurological:      General: No focal deficit present.      Mental Status: She is alert and oriented to person, place, and time.      Motor: No weakness.   Psychiatric:         Mood and Affect: Mood normal.         Behavior: Behavior normal.         Thought Content: Thought content normal.         Labs:   Lab Results   Component Value Date     01/06/2023    K 4.7 01/06/2023    CREATININE 0.8 01/06/2023    WBC 8.71 01/06/2023    HGB 10.5 (L) 01/06/2023     01/06/2023    AST 17 01/06/2023    ALT 12 01/06/2023    ALKPHOS 62 01/06/2023    BILITOT 0.5 01/06/2023          Imaging:     MRI Abdomen W WO Contrast  Narrative: EXAMINATION:  MRI ABDOMEN W WO CONTRAST    CLINICAL HISTORY:  Colon cancer, metastatic, staging;Colon cancer, non-metastatic, staging;  Malignant neoplasm of ascending colon    TECHNIQUE:  Multisequence, multiplanar MRI of the abdomen performed per liver protocol before and after administration of 10 mL Gadavist intravenous contrast.    COMPARISON:  CT chest 12/08/2022; CT abdomen pelvis 10/25/2022, 04/11/2022    FINDINGS:  Lower thorax: Small volume dependent right pleural effusion with right basilar atelectasis.  8 mm nodule within the right lower lobe with several other smaller right lower lobe nodules.    Liver: Normal size.  Normal homogeneous background signal.  Numerous scattered T2 hyperintense nonenhancing lesions throughout the liver  consistent cysts, largest is 1.4 cm.  Hepatic veins are patent.  Portal veins are patent and mildly dilated.    Biliary: Gallbladder is surgically absent.  No intrahepatic or extrahepatic biliary dilatation.    Pancreas: Advanced generalized pancreatic parenchymal atrophy.  Pancreatic duct is dilated markedly throughout measuring up to 1 cm.  Numerous nonenhancing pancreatic cystic lesions measuring up to 7 mm.  No pancreatic ductal dilatation.    Spleen: Normal size.  8 mm T2 hyperintense lesion in the spleen demonstrating progressive enhancement on subsequent post-contrast phases consistent with a hemangioma.    Adrenal glands: Unremarkable.    Kidneys: Numerous scattered bilateral T2 hyperintense nonenhancing cortical lesions consistent with cysts.  No solid enhancing renal masses.  No hydronephrosis.    Miscellaneous: Small hiatal hernia.  Postoperative changes of right hemicolectomy.  Scattered atherosclerotic plaque throughout the visualized aorta.  Visualized bowel loops are unremarkable.  No evidence for lymphadenopathy.  Impression: 1. In this patient with colon adenocarcinoma status post right hemicolectomy, there is no evidence of intrahepatic metastatic disease.  2. Suspected sequelae of chronic pancreatitis including diffuse pancreatic parenchymal atrophy, pancreatic ductal dilation, and numerous pancreatic cystic lesions.  3. Hepatic and renal cysts.  4. Small right pleural effusion.  Right lower lobe lung nodules.    Electronically signed by resident: Santhosh Herrera  Date:    12/19/2022  Time:    08:52    Electronically signed by: Calvin Gorman MD  Date:    12/19/2022  Time:    19:11            Diagnoses:       1. Malignant neoplasm of hepatic flexure    2. Primary hypertension    3. Lower extremity edema    4. Moderate persistent asthma without complication    5. Type 2 diabetes mellitus with microalbuminuria, without long-term current use of insulin            Assessment and Plan:     1. Malignant  neoplasm of hepatic flexure  Overview:  Right sided colon cancer diagnosed in setting of LBO requiring urgent hemicolectomy 10/25/2022 with path showing pT4aN0 disease. CT chest 12/7/22 with possible lung metastases and MR liver with indeterminate liver lesions.      Assessment & Plan:  Patient awaiting biopys of lung nodule scheduled for 1/23/23  - Follow up in clinic on 2/3/23 to review results      2. Primary hypertension  Overview:  Home medications include irbesartan and lasix    Assessment & Plan:  Continues to be poorly controlled.  - Start chlorthalidone 25mg po daily    Orders:  -     chlorthalidone (HYGROTEN) 25 MG Tab; Take 1 tablet (25 mg total) by mouth once daily.  Dispense: 30 tablet; Refill: 11    3. Lower extremity edema  Overview:  Home medications include furosemide 20mg po every other day    Assessment & Plan:  - Con't lasix 20mg every other day  - Add chlorthalidone given poor blood pressure control  - Schedule bilateral leg US    Orders:  -     CV Ultrasound doppler venous legs bilat; Future; Expected date: 01/06/2023    4. Moderate persistent asthma without complication  Overview:  Followed by Dr. Peguero. Home medications include Spiriva and montelukast and albuterol.    Assessment & Plan:  - Con't home medications      5. Type 2 diabetes mellitus with microalbuminuria, without long-term current use of insulin  Overview:  Home regimen includes levemir and Novolog.     Assessment & Plan:  - Con't home regimen               Route Chart for Scheduling    Med Onc Chart Routing      Follow up with physician . MOISES English 2/3/23   Follow up with DOMONIQUE    Infusion scheduling note    Injection scheduling note    Labs CBC, CMP and CEA   Lab interval:     Imaging Other   Doppler US   Pharmacy appointment    Other referrals          Therapy Plan Information  Medications  denosumab (PROLIA) injection 60 mg  60 mg, Subcutaneous, Every 26 weeks       Nakul English MD  Hematology/Oncology  [facility]

## 2023-01-06 NOTE — PATIENT INSTRUCTIONS
Still awaiting biosy of lung nodule on 1/24/23. Will follow up in clinic on 2/3/223 to review results.    For leg edema. Cont lasix to 20mg every other day. Will add chlorthalidone 25mg daily to help with leg swelling and blood pressure. Will arrange for ultrasound of legs to evaluate for blood clot.

## 2023-01-09 ENCOUNTER — TELEPHONE (OUTPATIENT)
Dept: ENDOCRINOLOGY | Facility: CLINIC | Age: 88
End: 2023-01-09
Payer: MEDICARE

## 2023-01-09 ENCOUNTER — OFFICE VISIT (OUTPATIENT)
Dept: ENDOCRINOLOGY | Facility: CLINIC | Age: 88
End: 2023-01-09
Payer: MEDICARE

## 2023-01-09 VITALS
WEIGHT: 102.06 LBS | BODY MASS INDEX: 17 KG/M2 | DIASTOLIC BLOOD PRESSURE: 61 MMHG | SYSTOLIC BLOOD PRESSURE: 110 MMHG | OXYGEN SATURATION: 99 % | HEART RATE: 66 BPM | HEIGHT: 65 IN

## 2023-01-09 DIAGNOSIS — E11.29 TYPE 2 DIABETES MELLITUS WITH MICROALBUMINURIA, WITHOUT LONG-TERM CURRENT USE OF INSULIN: Primary | ICD-10-CM

## 2023-01-09 DIAGNOSIS — R80.9 TYPE 2 DIABETES MELLITUS WITH MICROALBUMINURIA, WITHOUT LONG-TERM CURRENT USE OF INSULIN: Primary | ICD-10-CM

## 2023-01-09 DIAGNOSIS — M81.0 SENILE OSTEOPOROSIS: ICD-10-CM

## 2023-01-09 PROCEDURE — 99214 OFFICE O/P EST MOD 30 MIN: CPT | Mod: S$PBB,,, | Performed by: NURSE PRACTITIONER

## 2023-01-09 PROCEDURE — 99215 OFFICE O/P EST HI 40 MIN: CPT | Mod: PBBFAC | Performed by: NURSE PRACTITIONER

## 2023-01-09 PROCEDURE — 99999 PR PBB SHADOW E&M-EST. PATIENT-LVL V: ICD-10-PCS | Mod: PBBFAC,,, | Performed by: NURSE PRACTITIONER

## 2023-01-09 PROCEDURE — 99214 PR OFFICE/OUTPT VISIT, EST, LEVL IV, 30-39 MIN: ICD-10-PCS | Mod: S$PBB,,, | Performed by: NURSE PRACTITIONER

## 2023-01-09 PROCEDURE — 99999 PR PBB SHADOW E&M-EST. PATIENT-LVL V: CPT | Mod: PBBFAC,,, | Performed by: NURSE PRACTITIONER

## 2023-01-09 RX ORDER — OLOPATADINE HYDROCHLORIDE 2 MG/ML
1 SOLUTION/ DROPS OPHTHALMIC DAILY PRN
COMMUNITY
Start: 2022-11-15 | End: 2023-11-10 | Stop reason: ALTCHOICE

## 2023-01-09 RX ORDER — SODIUM CHLORIDE/SODIUM BICARB
PACKET (EA) NASAL
Status: ON HOLD | COMMUNITY
Start: 2022-08-09 | End: 2024-04-02 | Stop reason: HOSPADM

## 2023-01-09 RX ORDER — FLUTICASONE PROPIONATE AND SALMETEROL 232; 14 UG/1; UG/1
1 POWDER, METERED RESPIRATORY (INHALATION) 2 TIMES DAILY
COMMUNITY
Start: 2022-11-16 | End: 2023-07-13

## 2023-01-09 RX ORDER — OLOPATADINE HYDROCHLORIDE 2 MG/ML
SOLUTION/ DROPS OPHTHALMIC
COMMUNITY
Start: 2022-11-16 | End: 2023-11-10 | Stop reason: ALTCHOICE

## 2023-01-09 NOTE — ASSESSMENT & PLAN NOTE
-- Risks include low weight,  race, menopause, prior chronic glucocorticoid use.  -- Reviewed basics of quantity versus quality.  -- Reassuring they are not fracturing.  -- Recommend:  -Pharmacological therapy is recommended for patients with osteopenia if the 10 year probability of a hip fracture is >3% and 10 year probability of other major osteoporotic fractures is >20%.  Treatment options and potential side effects discussed for PO bisphosphonates, reclast, Denosumab, and Teriparatide.   -Treatment:   Actonel 8/2018 - 12/2021  She was given first dose of Prolia in 2/2018 and developed joint pains and lower ext edema so it was decided that she would not receive another dose of Prolia. We decided against Reclast for the same reason and instead restarted her on an oral bisphosphonate which she has tolerated without side effects. After her last visit 12/2021 we decided to stop the oral bisphosphonate and try Prolia again. She received Prolia  in 1/2022 without any side effects.  Last Dose: 8/11/2022  Continue Prolia every 6 months.  -Calcium and Vitamin D RDD provided.  -Exercise: recommended..  -Fall precautions made in the home.  -Alerted that if dental work needs to be done it should be done prior to initiating therapy. Dental health: UTD.  -- Repeat DEXA scan 11/2023.

## 2023-01-09 NOTE — ASSESSMENT & PLAN NOTE
-- Labs prior to follow up.  -- History of chronic pancreatitis. Low C peptide - manage as T1DM.  -- Brittle diabetic.  -- A1c goal < or = 8%.  -- Medications discussed:  Insulin   -- Reviewed logs/CGM:  Glucose variable.  Most concerning is hypoglycemia.  Not interested in personal CGM.  Agreeable to professional CGM.  -- Medication Changes:   Levemir 3 units twice daily   Novolog 5-5-5 units before meals   Add correction scale if needed.  Blood sugar 180 to 230 add 1 units  Blood sugar 231 to 280 add 2 units  Blood sugar 281 to 330 add 3 units  Blood sugar 331 to 380 add 4 units  Blood sugar greater than 380 add 5 units  -- Reviewed goals of therapy are to get the best control we can without hypoglycemia.  -- Reviewed patient's current insulin regimen. Clarified proper insulin dose and timing in relation to meals, etc. Insulin injection sites and proper rotation instructed.    -- Advised frequent self blood glucose monitoring.  Patient encouraged to document glucose results and bring them to every clinic visit.  -- Hypoglycemia precautions discussed. Instructed on precautions before driving.    -- Call for Bg repeatedly < 90 or > 180.   -- Close adherence to lifestyle changes recommended.   -- Periodic follow ups for eye evaluations, foot care and dental care suggested.

## 2023-01-10 ENCOUNTER — CLINICAL SUPPORT (OUTPATIENT)
Dept: ENDOCRINOLOGY | Facility: CLINIC | Age: 88
End: 2023-01-10
Payer: MEDICARE

## 2023-01-10 ENCOUNTER — OFFICE VISIT (OUTPATIENT)
Dept: PODIATRY | Facility: CLINIC | Age: 88
End: 2023-01-10
Payer: MEDICARE

## 2023-01-10 VITALS
DIASTOLIC BLOOD PRESSURE: 79 MMHG | HEIGHT: 65 IN | WEIGHT: 95.69 LBS | SYSTOLIC BLOOD PRESSURE: 124 MMHG | HEART RATE: 99 BPM | BODY MASS INDEX: 15.94 KG/M2

## 2023-01-10 DIAGNOSIS — E11.49 TYPE II DIABETES MELLITUS WITH NEUROLOGICAL MANIFESTATIONS: Primary | ICD-10-CM

## 2023-01-10 DIAGNOSIS — L84 CORN OR CALLUS: ICD-10-CM

## 2023-01-10 DIAGNOSIS — L30.9 DERMATITIS: ICD-10-CM

## 2023-01-10 DIAGNOSIS — B35.1 ONYCHOMYCOSIS DUE TO DERMATOPHYTE: ICD-10-CM

## 2023-01-10 PROCEDURE — 99214 OFFICE O/P EST MOD 30 MIN: CPT | Mod: PBBFAC | Performed by: PODIATRIST

## 2023-01-10 PROCEDURE — 11721 DEBRIDE NAIL 6 OR MORE: CPT | Mod: Q9,59,S$PBB, | Performed by: PODIATRIST

## 2023-01-10 PROCEDURE — 11721 PR DEBRIDEMENT OF NAILS, 6 OR MORE: ICD-10-PCS | Mod: Q9,59,S$PBB, | Performed by: PODIATRIST

## 2023-01-10 PROCEDURE — 11721 DEBRIDE NAIL 6 OR MORE: CPT | Mod: Q9,59,PBBFAC | Performed by: PODIATRIST

## 2023-01-10 PROCEDURE — 99213 PR OFFICE/OUTPT VISIT, EST, LEVL III, 20-29 MIN: ICD-10-PCS | Mod: 25,S$PBB,, | Performed by: PODIATRIST

## 2023-01-10 PROCEDURE — 11056 PR TRIM BENIGN HYPERKERATOTIC SKIN LESION,2-4: ICD-10-PCS | Mod: Q9,S$PBB,, | Performed by: PODIATRIST

## 2023-01-10 PROCEDURE — 11056 PARNG/CUTG B9 HYPRKR LES 2-4: CPT | Mod: Q9,S$PBB,, | Performed by: PODIATRIST

## 2023-01-10 PROCEDURE — 99999 PR PBB SHADOW E&M-EST. PATIENT-LVL IV: CPT | Mod: PBBFAC,,, | Performed by: PODIATRIST

## 2023-01-10 PROCEDURE — 99213 OFFICE O/P EST LOW 20 MIN: CPT | Mod: 25,S$PBB,, | Performed by: PODIATRIST

## 2023-01-10 PROCEDURE — 11056 PARNG/CUTG B9 HYPRKR LES 2-4: CPT | Mod: Q9,PBBFAC | Performed by: PODIATRIST

## 2023-01-10 PROCEDURE — 99999 PR PBB SHADOW E&M-EST. PATIENT-LVL IV: ICD-10-PCS | Mod: PBBFAC,,, | Performed by: PODIATRIST

## 2023-01-10 RX ORDER — CLOBETASOL PROPIONATE 0.5 MG/G
CREAM TOPICAL 2 TIMES DAILY
Qty: 60 G | Refills: 2 | Status: ON HOLD | OUTPATIENT
Start: 2023-01-10 | End: 2023-06-26 | Stop reason: HOSPADM

## 2023-01-10 NOTE — PROGRESS NOTES
Subjective:      Patient ID: Sussy Costa is a 87 y.o. female.    Chief Complaint: Diabetic Foot Exam, Diabetes Mellitus, and Nail Care    Pt presents today c/o pain in her left heel/ankle. Pt states that it is painful when she walks and when she wears shoes. Pt denies any trauma.  In addition, she is here for routine diabetic foot evaluation as well as a new issue of itching to the bottoms of both feet with some skin peeling.    Review of Systems   Constitutional: Negative for chills, decreased appetite, fever, malaise/fatigue and night sweats.   HENT:  Negative for congestion, ear discharge, hearing loss, nosebleeds and tinnitus.    Eyes:  Negative for double vision, pain and visual disturbance.   Cardiovascular:  Negative for chest pain, claudication, cyanosis and palpitations.   Respiratory:  Negative for cough, hemoptysis, shortness of breath and wheezing.    Endocrine: Negative for cold intolerance and heat intolerance.   Hematologic/Lymphatic: Negative for adenopathy and bleeding problem.   Skin:  Positive for color change, dry skin, nail changes and unusual hair distribution. Negative for flushing and rash.   Musculoskeletal:  Positive for arthritis and stiffness. Negative for joint pain and myalgias.   Gastrointestinal:  Negative for abdominal pain, dysphagia, nausea and vomiting.   Genitourinary:  Negative for dysuria, flank pain, hematuria and pelvic pain.   Neurological:  Positive for disturbances in coordination. Negative for loss of balance, numbness, paresthesias and sensory change.   Psychiatric/Behavioral:  Negative for altered mental status, hallucinations and suicidal ideas. The patient does not have insomnia.    Allergic/Immunologic: Negative for environmental allergies and persistent infections.         Objective:      Physical Exam  Vitals reviewed.   Constitutional:       Appearance: She is well-developed.   HENT:      Head: Normocephalic and atraumatic.   Cardiovascular:      Pulses:            Dorsalis pedis pulses are 2+ on the right side and 2+ on the left side.        Posterior tibial pulses are 2+ on the right side and 2+ on the left side.      Comments: No edema noted to b/L LEs  Pulmonary:      Effort: Pulmonary effort is normal.   Musculoskeletal:         General: Normal range of motion.      Comments: Muscle strength is 5/5 in all groups bilaterally.  Non reducible equinus noted to left lower extremity  POP to insertion of the AT, left   Feet:      Right foot:      Protective Sensation: 5 sites tested.  5 sites sensed.      Skin integrity: Callus and dry skin present.      Left foot:      Protective Sensation: 5 sites tested.  5 sites sensed.      Skin integrity: Callus and dry skin present.   Skin:     General: Skin is warm and dry.      Capillary Refill: Capillary refill takes 2 to 3 seconds.      Coloration: Skin is pale.      Comments: Long, thickened, discolored  toenails 1-5 with subungual debris.  Skin dry thin and atrophic.   Neurological:      Mental Status: She is alert and oriented to person, place, and time.      Sensory: Sensory deficit present.      Motor: Atrophy present.      Deep Tendon Reflexes: Reflexes abnormal.      Reflex Scores:       Patellar reflexes are 1+ on the right side and 1+ on the left side.       Achilles reflexes are 1+ on the right side and 1+ on the left side.     Comments: Intact gross sensation noted to b/L LEs   Psychiatric:         Behavior: Behavior normal.             Assessment:       No diagnosis found.        Plan:       There are no diagnoses linked to this encounter.    I counseled the patient on her conditions, their implications and medical management.    Routine Foot Care    Performed by:  Alen Campa. DPM  Authorized by:  Patient     Consent Done?:  Yes (Verbal)     Nail Care Type:  Debride  Location(s): All  (Left 1st Toe, Left 3rd Toe, Left 2nd Toe, Left 4th Toe, Left 5th Toe, Right 1st Toe, Right 2nd Toe, Right 3rd Toe, Right 4th  Toe and Right 5th Toe)  Patient tolerance:  Patient tolerated the procedure well with no immediate complications     With patient's permission, the toenails mentioned above were aggressively reduced and debrided using a nail nipper, removing all offending nail and debris. The patient will continue to monitor the areas daily, inspect the feet, wear protective shoe gear when ambulatory, and moisturizer to maintain skin integrity.      Callus Care Type: Debride    With patient's permission, the calluses/hyperkeratotic lesions mentioned above were aggressively reduced and debrided using a number 15 blade. The patient will continue to monitor the areas daily, inspect the feet, wear protective shoe gear when ambulatory, and moisturizer to maintain skin integrity.       Shoe inspection. Diabetic Foot Education. Patient reminded of the importance of good nutrition and blood sugar control to help prevent podiatric complications of diabetes. Patient instructed on proper foot hygeine. We discussed wearing proper shoe gear, daily foot inspections and Diabetic foot education in detail.    Return to clinic in 3-6 months or sooner if problems arise         .

## 2023-01-10 NOTE — PROGRESS NOTES
"PLACEMENT OF DEXCOM G6 PRO SENSOR  CONTINOUS GLUCOSE MONITORING SYSTEM (CGMS)     Patient is here in clinic today for placement of continuous glucose monitoring sensor.                Each patient verified that they were here for CGMS procedure ordered by their provider and that they have a working glucose meter and supplies at home.   Patient will be provided with a Dexcom G6 Pro sensor, transmitter, and a copy of the Continuous Glucose Monitoring Patient Log to fill out during the study.              A detailed  explanation of Continuous Glucose Monitoring was  provided. Patient informed that this is a blind procedure and that they will not actually see the blood sugar tracing in real time.    Instructed patient to check blood sugar using home glucometer and to record the following on provided patient log sheets:Blood sugar taken at home, Meals and snacks, Activity, and Diabetes medications taken and dosage               Patient was brought to a private location.  Site selected and prepared and allowed to dry. Glucose Transmitter Serial Number [id number]  was inserted to patient's abdomen.               The following forms  were given and reviewed in detail with patient and all questions answered.   Continuous Glucose Monitoring Patient Log   Dexcom G6 PRO Patient Handout "Blinded CGM Patient Handout"                 Instructions: Time: 15 min   Insertion of sensor:  Time: 5 minutes      "

## 2023-01-11 ENCOUNTER — HOSPITAL ENCOUNTER (OUTPATIENT)
Dept: CARDIOLOGY | Facility: HOSPITAL | Age: 88
Discharge: HOME | End: 2023-01-11
Attending: HOSPITALIST
Payer: MEDICARE

## 2023-01-11 DIAGNOSIS — R60.0 LOWER EXTREMITY EDEMA: ICD-10-CM

## 2023-01-11 PROCEDURE — 93970 EXTREMITY STUDY: CPT | Mod: 26,,, | Performed by: INTERNAL MEDICINE

## 2023-01-11 PROCEDURE — 93970 EXTREMITY STUDY: CPT

## 2023-01-11 PROCEDURE — 93970 CV US DOPPLER VENOUS LEGS BILATERAL (CUPID ONLY): ICD-10-PCS | Mod: 26,,, | Performed by: INTERNAL MEDICINE

## 2023-01-16 PROCEDURE — G0179 PR HOME HEALTH MD RECERTIFICATION: ICD-10-PCS | Mod: ,,, | Performed by: INTERNAL MEDICINE

## 2023-01-16 PROCEDURE — G0179 MD RECERTIFICATION HHA PT: HCPCS | Mod: ,,, | Performed by: INTERNAL MEDICINE

## 2023-01-18 ENCOUNTER — OFFICE VISIT (OUTPATIENT)
Dept: INTERNAL MEDICINE | Facility: CLINIC | Age: 88
End: 2023-01-18
Payer: MEDICARE

## 2023-01-18 ENCOUNTER — CLINICAL SUPPORT (OUTPATIENT)
Dept: ENDOCRINOLOGY | Facility: CLINIC | Age: 88
End: 2023-01-18
Payer: MEDICARE

## 2023-01-18 VITALS
RESPIRATION RATE: 14 BRPM | BODY MASS INDEX: 16.88 KG/M2 | WEIGHT: 101.44 LBS | TEMPERATURE: 97 F | DIASTOLIC BLOOD PRESSURE: 60 MMHG | OXYGEN SATURATION: 95 % | SYSTOLIC BLOOD PRESSURE: 112 MMHG | HEART RATE: 84 BPM

## 2023-01-18 DIAGNOSIS — R80.9 TYPE 2 DIABETES MELLITUS WITH MICROALBUMINURIA, WITHOUT LONG-TERM CURRENT USE OF INSULIN: ICD-10-CM

## 2023-01-18 DIAGNOSIS — E11.29 TYPE 2 DIABETES MELLITUS WITH MICROALBUMINURIA, WITHOUT LONG-TERM CURRENT USE OF INSULIN: ICD-10-CM

## 2023-01-18 DIAGNOSIS — C18.9 MALIGNANT NEOPLASM OF COLON, UNSPECIFIED PART OF COLON: ICD-10-CM

## 2023-01-18 DIAGNOSIS — I10 HYPERTENSION, UNSPECIFIED TYPE: Primary | ICD-10-CM

## 2023-01-18 DIAGNOSIS — M79.89 LEG SWELLING: ICD-10-CM

## 2023-01-18 PROCEDURE — 95251 PR GLUCOSE MONITOR, 72 HOUR, PHYS INTERP: ICD-10-PCS | Mod: ,,, | Performed by: NURSE PRACTITIONER

## 2023-01-18 PROCEDURE — 99215 OFFICE O/P EST HI 40 MIN: CPT | Mod: PBBFAC,PO | Performed by: STUDENT IN AN ORGANIZED HEALTH CARE EDUCATION/TRAINING PROGRAM

## 2023-01-18 PROCEDURE — 99214 OFFICE O/P EST MOD 30 MIN: CPT | Mod: S$PBB,GC,, | Performed by: STUDENT IN AN ORGANIZED HEALTH CARE EDUCATION/TRAINING PROGRAM

## 2023-01-18 PROCEDURE — 95251 CONT GLUC MNTR ANALYSIS I&R: CPT | Mod: ,,, | Performed by: NURSE PRACTITIONER

## 2023-01-18 PROCEDURE — 99214 PR OFFICE/OUTPT VISIT, EST, LEVL IV, 30-39 MIN: ICD-10-PCS | Mod: S$PBB,GC,, | Performed by: STUDENT IN AN ORGANIZED HEALTH CARE EDUCATION/TRAINING PROGRAM

## 2023-01-18 PROCEDURE — 99999 PR PBB SHADOW E&M-EST. PATIENT-LVL V: CPT | Mod: PBBFAC,GC,, | Performed by: STUDENT IN AN ORGANIZED HEALTH CARE EDUCATION/TRAINING PROGRAM

## 2023-01-18 PROCEDURE — 95250 CONT GLUC MNTR PHYS/QHP EQP: CPT | Mod: PBBFAC | Performed by: NURSE PRACTITIONER

## 2023-01-18 PROCEDURE — 99999 PR PBB SHADOW E&M-EST. PATIENT-LVL V: ICD-10-PCS | Mod: PBBFAC,GC,, | Performed by: STUDENT IN AN ORGANIZED HEALTH CARE EDUCATION/TRAINING PROGRAM

## 2023-01-18 RX ORDER — POTASSIUM CHLORIDE 20 MEQ/1
20 TABLET, EXTENDED RELEASE ORAL EVERY OTHER DAY
Qty: 30 TABLET | Refills: 3 | Status: SHIPPED | OUTPATIENT
Start: 2023-01-18 | End: 2023-03-01

## 2023-01-18 NOTE — PROGRESS NOTES
Subjective     Chief Complaint: Follow up    History of Present Illness:  Ms. Sussy Costa is a 87 y.o. female, patient of Dr. Casillas, with HTN, asthma, recurrent PNA and hypoxic respiratory failure, and DM2 who was hospitalized 10/2022 for large bowel obstruction. 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 CT chest 12/8/22 concerning for multiple new lung nodules. Patient being seen today for follow up.     HTN  On irbesartan and chlorthalidone.  CMP last week with normal K and Cr. BP controlled on todays visit and discussed home log for further titration if needed.     LE swelling  Patient reports taking every other day lasix. Patient reports improved PO intake with meals. Patient denies any other extremity swelling and reports normal nocturnal urination with no significant sleep disturbance. CMP reviewed from last week with normal results. K of 4.7 and is still taking daily potassium but QOD lasix. Patient reports some persistent redness which has improved with cream from podiatry.     DM  Recently completed 72 hour CGM with endocrine. Reported fluctuating appetite. Reports occasional Glucerna or boost intake. Reported less hypoglycemic episodes with 1-2 in the last 3 months. Denies any symptoms with each episode. Reported low of 68. Will continue to consider CGM monitoring based on this.     Colon CA  Planning for IR lung biopsy next week. If positive plan for adjuvant chemo and if not is complete from therapy standpoint. BM have improved and are more frequent than before. Occasional incontinence but plans for PRN imodium per CRS recommendations. No reported stomach pain but occasional cramps that improve with eating.     Cough  Has recently had increased coughing and clear mucus production. Swollen sinuses. Reported seeing ENT and using nasal nebulized solution as well as xyzal and singular. Had to use nebulizer  on 1/10 and 1/11.     Review of Systems   Constitutional:  Negative for chills, fever and malaise/fatigue.   HENT:  Positive for congestion. Negative for sore throat.    Eyes:  Negative for blurred vision and pain.   Respiratory:  Positive for cough. Negative for sputum production, shortness of breath and wheezing.    Cardiovascular:  Positive for leg swelling. Negative for chest pain, palpitations, orthopnea, claudication and PND.   Gastrointestinal:  Positive for diarrhea. Negative for abdominal pain, blood in stool, constipation, heartburn, melena, nausea and vomiting.   Genitourinary:  Negative for dysuria, frequency and hematuria.   Skin:  Negative for rash.   Neurological:  Negative for dizziness, weakness and headaches.   Psychiatric/Behavioral: Negative.       PAST HISTORY:     Past Medical History:   Diagnosis Date    Asthma     Cyst of pancreas     liver    Diabetes mellitus type II     Eczema of hand     Glaucoma     History of recurrent pneumonia 9/28/2020    Hyperlipidemia     Hypertension     Lichen planus     gums    Osteoporosis     Pulmonary nodules        Past Surgical History:   Procedure Laterality Date    BRONCHOSCOPY N/A 5/13/2022    Procedure: Bronchoscopy;  Surgeon: [facility] Diagnostic Provider;  Location: [facility] OR [address] [place] [address];  Service: Anesthesiology;  Laterality: N/A;    CATARACT EXTRACTION, BILATERAL      CHOLECYSTECTOMY      COLECTOMY, RIGHT Right 10/25/2022    Procedure: COLECTOMY, RIGHT;  Surgeon: Jass Holman MD;  Location: [facility] OR [address] [place] [address];  Service: Colon and Rectal;  Laterality: Right;    ENDOSCOPIC ULTRASOUND OF UPPER GASTROINTESTINAL TRACT N/A 4/22/2022    Procedure: ULTRASOUND, UPPER GI TRACT, ENDOSCOPIC;  Surgeon: Max Rosado MD;  Location: [facility] ENDO ([address] [place] [address]);  Service: Endoscopy;  Laterality: N/A;  urgent EUS (linear) for abnormal CT scan with dilated PD and increased cyst of pancreas cyst.  pap 44 mmHg  4/13:fully vaccinated. instructions via portal-[facility]    EPIDURAL  STEROID INJECTION INTO LUMBAR SPINE N/A 2018    Procedure: Injection-steroid-epidural-lumbar L5-S1;  Surgeon: Nicci Osorio Jr., MD;  Location: [facility] PAIN MGT;  Service: Pain Management;  Laterality: N/A;    FUNCTIONAL ENDOSCOPIC SINUS SURGERY (FESS) USING COMPUTER-ASSISTED NAVIGATION N/A 2019    Procedure: FESS, USING COMPUTER-ASSISTED NAVIGATION;  Surgeon: Rosangela Isabel MD;  Location: [facility] OR;  Service: ENT;  Laterality: N/A;  video    HYSTERECTOMY      nasal polyps         Family History   Problem Relation Age of Onset    Heart disease Father     Heart disease Brother     Hypertension Brother          MEDICATIONS & ALLERGIES:     Current Outpatient Medications on File Prior to Visit   Medication Sig    ACCU-CHEK FASTCLIX LANCET DRUM Misc CHECK BLOOD GLUCOSE FOUR TIMES DAILY    ACCU-CHEK GUIDE TEST STRIPS Strp TEST FOUR TIMES DAILY WITH MEALS AND NIGHTLY    atorvastatin (LIPITOR) 20 MG tablet Take 1 tablet (20 mg total) by mouth every evening.    budesonide 1 mg/2 mL NbSp SMARTSI Vial(s) Both Nares Twice Daily    chlorthalidone (HYGROTEN) 25 MG Tab Take 1 tablet (25 mg total) by mouth once daily.    clobetasoL (TEMOVATE) 0.05 % cream Apply topically 2 (two) times daily.    insulin aspart U-100 (NOVOLOG FLEXPEN U-100 INSULIN) 100 unit/mL (3 mL) InPn pen Take 10 units with breakfast and lunch, and 12 units with dinner, plus correction scale, max TDD 40 units    insulin detemir U-100 (LEVEMIR FLEXTOUCH U-100 INSULN) 100 unit/mL (3 mL) InPn pen Inject 3 Units into the skin 2 (two) times daily.    irbesartan (AVAPRO) 300 MG tablet Take 1 tablet (300 mg total) by mouth every evening.    latanoprost 0.005 % ophthalmic solution Inject into the eye. 1 Drops Ophthalmic Every evening    levocetirizine (XYZAL) 5 MG tablet Take 1 tablet (5 mg total) by mouth every evening.    lipase-protease-amylase 24,000-76,000-120,000 units (CREON) 24,000-76,000 -120,000 unit capsule Take 2 capsules with meals  "orally and 1 capsule with snacks.    magnesium oxide (MAG-OX) 400 mg (241.3 mg magnesium) tablet Take 400 mg by mouth once daily.    montelukast (SINGULAIR) 10 mg tablet TAKE 1 TABLET BY MOUTH EVERY EVENING    NEILMED SINUS RINSE REFILL Pack use as directed    pen needle, diabetic 31 gauge x 5/16" Ndle Inject 1 each into the skin 4 (four) times daily with meals and nightly. Use with insulin pen    potassium chloride SA (K-DUR,KLOR-CON) 20 MEQ tablet Take 1 tablet (20 mEq total) by mouth once daily.    sodium chloride 3% 3 % nebulizer solution Take 4 mLs by nebulization every 6 (six) hours.    vitamin D (VITAMIN D3) 1000 units Tab Take 1,000 Units by mouth once daily.    [DISCONTINUED] lipase-protease-amylase 12,000-38,000-60,000 units (CREON) CpDR Take 24,000 Units by mouth.    albuterol (PROAIR HFA) 90 mcg/actuation inhaler Inhale 2 puffs into the lungs every 6 (six) hours as needed for Wheezing. Rescue (Patient not taking: Reported on 1/18/2023)    albuterol (PROVENTIL) 2.5 mg /3 mL (0.083 %) nebulizer solution Take 3 mLs (2.5 mg total) by nebulization every 6 (six) hours as needed for Wheezing or Shortness of Breath. Rescue (Patient not taking: Reported on 1/18/2023)    albuterol-ipratropium (DUO-NEB) 2.5 mg-0.5 mg/3 mL nebulizer solution Take 3 mLs by nebulization every 4 (four) hours. Rescue (Patient not taking: Reported on 1/18/2023)    blood sugar diagnostic Strp 1 strip by Misc.(Non-Drug; Combo Route) route 4 (four) times daily with meals and nightly.    fluticasone propion-salmeteroL 232-14 mcg/actuation AePB Inhale 1 puff into the lungs 2 (two) times daily.    fluticasone-salmeterol diskus inhaler 500-50 mcg Inhale 1 puff into the lungs 2 (two) times daily. Controller (Patient not taking: Reported on 1/18/2023)    furosemide (LASIX) 20 MG tablet 1 po qod (Patient not taking: Reported on 1/18/2023)    glucagon (GVOKE HYPOPEN 2-PACK) 1 mg/0.2 mL AtIn Inject 1 mg into the skin as needed (severe hypoglycemia). " (Patient not taking: Reported on 1/18/2023)    neomycin-polymyxin-hydrocortisone (CORTISPORIN) 3.5-10,000-1 mg/mL-unit/mL-% otic suspension Use bid for nails (Patient not taking: Reported on 1/18/2023)    olopatadine (PATADAY) 0.2 % Drop Place into both eyes.    olopatadine (PATADAY) 0.2 % Drop Apply 1 drop to eye daily as needed.    olopatadine (PATANOL) 0.1 % ophthalmic solution Place 1 drop into both eyes 2 (two) times daily as needed. 1 Drops Ophthalmic Twice a day     tiotropium bromide (SPIRIVA RESPIMAT) 2.5 mcg/actuation inhaler Inhale 2 puffs into the lungs once daily. Controller    [DISCONTINUED] risedronate (ACTONEL) 35 MG tablet Take 1 tablet (35 mg total) by mouth every 7 days.     No current facility-administered medications on file prior to visit.       Review of patient's allergies indicates:   Allergen Reactions    Aspirin Other (See Comments)     Asthma    TRIAD OF NASAL POLYPS, ASA  ALLERGY AND ASTHMA.        Aspirin Other (See Comments)    Nsaids (non-steroidal anti-inflammatory drug) Other (See Comments)     AVOID DUE TO TRIAD OF ASA ALLERGY, NASAL POLYPS,AND ASTHMA  AVOID DUE TO TRIAD OF ASA ALLERGY, NASAL POLYPS,AND ASTHMA    Penicillin      Other reaction(s): Rash    9/30/20: tolerates ceftriaxone    Penicillin g Other (See Comments)     Other reaction(s): Rash    9/30/20: tolerates ceftriaxone       OBJECTIVE:     Vital Signs:  Vitals:    01/18/23 1419   Resp: 14   Weight: 46 kg (101 lb 6.6 oz)       Body mass index is 16.88 kg/m².     Physical Exam   Constitutional: She is oriented to person, place, and time. normal appearance. No distress. She does not appear ill.   HENT:   Nose: Mucosal edema, rhinorrhea and nasal congestion present.   Mouth/Throat: Mucous membranes are moist.   Eyes: Pupils are equal, round, and reactive to light. No scleral icterus.   Cardiovascular: Normal rate, regular rhythm, normal heart sounds and normal pulses.   No murmur heard.Pulmonary:      Effort: Pulmonary  effort is normal. No respiratory distress.      Breath sounds: Examination of the right-lower field reveals wheezing. Examination of the left-lower field reveals wheezing. Wheezing present. No rales.     Abdominal: Soft. Normal appearance. She exhibits no distension. There is no abdominal tenderness. There is no guarding.   Musculoskeletal:         General: Normal range of motion.      Right lower leg: No edema.      Left lower leg: No edema.   Neurological: She is alert and oriented to person, place, and time. She displays no weakness.   Skin: Skin is warm and dry. Capillary refill takes less than 2 seconds. No jaundice.   Psychiatric: Her behavior is normal. Mood, judgment and thought content normal.   Nursing note and vitals reviewed.     Laboratory  Lab Results   Component Value Date    WBC 8.71 01/06/2023    HGB 10.5 (L) 01/06/2023    HCT 34.0 (L) 01/06/2023    MCV 92 01/06/2023     01/06/2023     @[id number](GLU,NA,K,Cl,CO2,BUN,Creatinine,Calcium,MG)@  Lab Results   Component Value Date    INR 1.1 12/28/2022    INR 1.2 07/05/2022     Lab Results   Component Value Date    HGBA1C 6.9 (H) 11/30/2022     No results for input(s): POCTGLUCOSE in the last 72 hours.    Diagnostic Results:  Labs: Reviewed    ASSESSMENT & PLAN:   Ms. Sussy Costa is a 87 y.o. female    Cough  Concern for rhinitis vs sinusitis. Likely mild in nature. Used nebulizer 2x last week and now reporting improved symptoms. Will hold on any steroids at this time given DM and improved symptoms if recurrent patient instructed to call and will complete steroid pack. Very minimal wheezing on exam.     Plan  - continue supportive care and nebulized nasal solution given by ENT  - continue home inhalers  - if no improvement will send in steroid pack for exacerbation    Hypertension, unspecified type  Continue irbesartan and chlorthalidone. Normotensive here. Will consider home BP log given fluctuations    Leg swelling  On thiazide and lasix  QOD. Will decrease PO potassium to QOD as well    Type 2 diabetes mellitus with microalbuminuria, without long-term current use of insulin  Seen by endocrine today after CGM monitoring for hypoglycemia. Improved hypoglycemic episodes and insulin regimen was decreased to levemir 3 BID and novolog 5 TID. Discussed with patient the benefit of CGM and she will consider further use with endocrine.     Malignant neoplasm of colon, unspecified part of colon  Per Heme onc           Titus Hagan D.O.  PGY-3 Internal Medicine  [facility]  [address] [id number]

## 2023-01-18 NOTE — PATIENT INSTRUCTIONS
Will keep blood pressure log for 3 weeks    Will change potassium to every other day to go with the lasix/fluid pill    Come back in 1 month for BP check

## 2023-01-19 ENCOUNTER — PATIENT MESSAGE (OUTPATIENT)
Dept: ENDOCRINOLOGY | Facility: CLINIC | Age: 88
End: 2023-01-19
Payer: MEDICARE

## 2023-01-23 ENCOUNTER — PATIENT MESSAGE (OUTPATIENT)
Dept: INTERVENTIONAL RADIOLOGY/VASCULAR | Facility: HOSPITAL | Age: 88
End: 2023-01-23
Payer: MEDICARE

## 2023-01-24 ENCOUNTER — HOSPITAL ENCOUNTER (OUTPATIENT)
Dept: INTERVENTIONAL RADIOLOGY/VASCULAR | Facility: HOSPITAL | Age: 88
Discharge: HOME | End: 2023-01-24
Payer: MEDICARE

## 2023-01-24 ENCOUNTER — HOSPITAL ENCOUNTER (OUTPATIENT)
Dept: RADIOLOGY | Facility: HOSPITAL | Age: 88
Discharge: HOME | End: 2023-01-24
Attending: RADIOLOGY
Payer: MEDICARE

## 2023-01-24 DIAGNOSIS — R91.8 LUNG MASS: ICD-10-CM

## 2023-01-24 LAB — POCT GLUCOSE: 245 MG/DL (ref 70–110)

## 2023-01-24 PROCEDURE — 32408 IR BIOPSY LUNG W/ GUIDANCE: ICD-10-PCS | Mod: ,,, | Performed by: RADIOLOGY

## 2023-01-24 PROCEDURE — 71045 XR CHEST AP PORTABLE: ICD-10-PCS | Mod: 26,,, | Performed by: RADIOLOGY

## 2023-01-24 PROCEDURE — 88177 CYTP FNA EVAL EA ADDL: CPT | Performed by: PATHOLOGY

## 2023-01-24 PROCEDURE — 71045 X-RAY EXAM CHEST 1 VIEW: CPT | Mod: 26,76,, | Performed by: RADIOLOGY

## 2023-01-24 PROCEDURE — 88342 IMHCHEM/IMCYTCHM 1ST ANTB: CPT | Mod: 26,,, | Performed by: PATHOLOGY

## 2023-01-24 PROCEDURE — 71045 XR CHEST AP PORTABLE: ICD-10-PCS | Mod: 26,76,, | Performed by: RADIOLOGY

## 2023-01-24 PROCEDURE — 88341 IMHCHEM/IMCYTCHM EA ADD ANTB: CPT | Mod: 26,,, | Performed by: PATHOLOGY

## 2023-01-24 PROCEDURE — 88342 CHG IMMUNOCYTOCHEMISTRY: ICD-10-PCS | Mod: 26,,, | Performed by: PATHOLOGY

## 2023-01-24 PROCEDURE — 88305 TISSUE EXAM BY PATHOLOGIST: ICD-10-PCS | Mod: 26,,, | Performed by: PATHOLOGY

## 2023-01-24 PROCEDURE — 88342 IMHCHEM/IMCYTCHM 1ST ANTB: CPT | Performed by: PATHOLOGY

## 2023-01-24 PROCEDURE — 32408 CORE NDL BX LNG/MED PERQ: CPT | Performed by: RADIOLOGY

## 2023-01-24 PROCEDURE — 88177 CYTP FNA EVAL EA ADDL: CPT | Mod: 26,,, | Performed by: PATHOLOGY

## 2023-01-24 PROCEDURE — 88172 CYTP DX EVAL FNA 1ST EA SITE: CPT | Mod: 26,,, | Performed by: PATHOLOGY

## 2023-01-24 PROCEDURE — 71045 X-RAY EXAM CHEST 1 VIEW: CPT | Mod: TC

## 2023-01-24 PROCEDURE — 63600175 PHARM REV CODE 636 W HCPCS: Performed by: RADIOLOGY

## 2023-01-24 PROCEDURE — 88341 IMHCHEM/IMCYTCHM EA ADD ANTB: CPT | Performed by: PATHOLOGY

## 2023-01-24 PROCEDURE — 27201068 IR BIOPSY LUNG W/ GUIDANCE

## 2023-01-24 PROCEDURE — 88177 PR  EVALUATION OF FNA SMEAR TO DETERMINE ADEQUACY, EA ADD EVAL: ICD-10-PCS | Mod: 26,,, | Performed by: PATHOLOGY

## 2023-01-24 PROCEDURE — 88172 PR  EVALUATION OF FNA SMEAR TO DETERMINE ADEQUACY, FIRST EVAL: ICD-10-PCS | Mod: 26,,, | Performed by: PATHOLOGY

## 2023-01-24 PROCEDURE — 88305 TISSUE EXAM BY PATHOLOGIST: CPT | Mod: 26,,, | Performed by: PATHOLOGY

## 2023-01-24 PROCEDURE — 88172 CYTP DX EVAL FNA 1ST EA SITE: CPT | Performed by: PATHOLOGY

## 2023-01-24 PROCEDURE — 88305 TISSUE EXAM BY PATHOLOGIST: CPT | Mod: 59 | Performed by: PATHOLOGY

## 2023-01-24 PROCEDURE — 88341 PR IHC OR ICC EACH ADD'L SINGLE ANTIBODY  STAINPR: ICD-10-PCS | Mod: 26,,, | Performed by: PATHOLOGY

## 2023-01-24 PROCEDURE — 88173 CYTOPATH EVAL FNA REPORT: CPT | Mod: 26,,, | Performed by: PATHOLOGY

## 2023-01-24 PROCEDURE — 71045 X-RAY EXAM CHEST 1 VIEW: CPT | Mod: 26,,, | Performed by: RADIOLOGY

## 2023-01-24 PROCEDURE — 88173 PR  INTERPRETATION OF FNA SMEAR: ICD-10-PCS | Mod: 26,,, | Performed by: PATHOLOGY

## 2023-01-24 PROCEDURE — 88112 CYTOPATH CELL ENHANCE TECH: CPT | Performed by: PATHOLOGY

## 2023-01-24 RX ORDER — LIDOCAINE HYDROCHLORIDE 10 MG/ML
1 INJECTION, SOLUTION EPIDURAL; INFILTRATION; INTRACAUDAL; PERINEURAL ONCE
Status: DISCONTINUED | OUTPATIENT
Start: 2023-01-24 | End: 2023-01-25 | Stop reason: HOSPADM

## 2023-01-24 RX ORDER — SODIUM CHLORIDE 9 MG/ML
75 INJECTION, SOLUTION INTRAVENOUS CONTINUOUS
Status: DISCONTINUED | OUTPATIENT
Start: 2023-01-24 | End: 2023-01-25 | Stop reason: HOSPADM

## 2023-01-24 RX ORDER — MIDAZOLAM HYDROCHLORIDE 1 MG/ML
INJECTION INTRAMUSCULAR; INTRAVENOUS
Status: COMPLETED | OUTPATIENT
Start: 2023-01-24 | End: 2023-01-24

## 2023-01-24 RX ORDER — FENTANYL CITRATE 50 UG/ML
INJECTION, SOLUTION INTRAMUSCULAR; INTRAVENOUS
Status: COMPLETED | OUTPATIENT
Start: 2023-01-24 | End: 2023-01-24

## 2023-01-24 RX ADMIN — MIDAZOLAM HYDROCHLORIDE 0.5 MG: 1 INJECTION INTRAMUSCULAR; INTRAVENOUS at 09:01

## 2023-01-24 RX ADMIN — FENTANYL CITRATE 25 MCG: 50 INJECTION, SOLUTION INTRAMUSCULAR; INTRAVENOUS at 10:01

## 2023-01-24 RX ADMIN — MIDAZOLAM HYDROCHLORIDE 0.5 MG: 1 INJECTION INTRAMUSCULAR; INTRAVENOUS at 10:01

## 2023-01-24 RX ADMIN — FENTANYL CITRATE 25 MCG: 50 INJECTION, SOLUTION INTRAMUSCULAR; INTRAVENOUS at 09:01

## 2023-01-24 NOTE — DISCHARGE INSTRUCTIONS
Please call with any questions or concerns.      Monday thru Friday 8:00 am - 4:30 pm    Interventional Radiology   [phone]    After Hours    Ask for the Radiology Intern on call  [phone]

## 2023-01-24 NOTE — PLAN OF CARE
Pt completed right LL lung biopsy without adverse event. Dressing to right mid back CDI. Pt will be transferred to [facility] for post procedure monitoring, report to be given at bedside to recovery nurse. Pt to have follow up CXRs upon arrival to [facility] and again in 3 hrs.

## 2023-01-24 NOTE — H&P
Radiology History & Physical      SUBJECTIVE:     Chief Complaint: lung mass    History of Present Illness:  Sussy Costa is a 87 y.o. female who presents for R lung mass biopsy.  Past Medical History:   Diagnosis Date    Asthma     Cyst of pancreas     liver    Diabetes mellitus type II     Eczema of hand     Glaucoma     History of recurrent pneumonia 9/28/2020    Hyperlipidemia     Hypertension     Lichen planus     gums    Osteoporosis     Pulmonary nodules      Past Surgical History:   Procedure Laterality Date    BRONCHOSCOPY N/A 5/13/2022    Procedure: Bronchoscopy;  Surgeon: [facility] Diagnostic Provider;  Location: [facility] OR [address] [place] [address];  Service: Anesthesiology;  Laterality: N/A;    CATARACT EXTRACTION, BILATERAL      CHOLECYSTECTOMY      COLECTOMY, RIGHT Right 10/25/2022    Procedure: COLECTOMY, RIGHT;  Surgeon: Jass Holman MD;  [facility];  Service: Colon and Rectal;  Laterality: Right;    ENDOSCOPIC ULTRASOUND OF UPPER GASTROINTESTINAL TRACT N/A 4/22/2022    Procedure: ULTRASOUND, UPPER GI TRACT, ENDOSCOPIC;  Surgeon: Max Rosado MD;  Location: [facility] ENDO ([facility]);  Service: Endoscopy;  Laterality: N/A;  urgent EUS (linear) for abnormal CT scan with dilated PD and increased cyst of pancreas cyst.  pap 44 mmHg  4/13:fully vaccinated. instructions via portal-[facility]    EPIDURAL STEROID INJECTION INTO LUMBAR SPINE N/A 11/8/2018    Procedure: Injection-steroid-epidural-lumbar L5-S1;  Surgeon: Nicci Osorio Jr., MD;  Location: [facility] PAIN MGT;  Service: Pain Management;  Laterality: N/A;    FUNCTIONAL ENDOSCOPIC SINUS SURGERY (FESS) USING COMPUTER-ASSISTED NAVIGATION N/A 6/17/2019    Procedure: FESS, USING COMPUTER-ASSISTED NAVIGATION;  Surgeon: Rosangela Isabel MD;  Location: [facility] OR;  Service: ENT;  Laterality: N/A;  video    HYSTERECTOMY      nasal polyps         Home Meds:   Prior to Admission medications    Medication Sig Start Date End Date Taking? Authorizing Provider    ACCU-CHEK FASTCLIX LANCET DRUM [facility] CHECK BLOOD GLUCOSE FOUR TIMES DAILY 23  Yes Joshua Martines MD   ACCU-CHEK GUIDE TEST STRIPS Strp TEST FOUR TIMES DAILY WITH MEALS AND NIGHTLY 10/17/22  Yes Joshua Martines MD   albuterol (PROVENTIL) 2.5 mg /3 mL (0.083 %) nebulizer solution Take 3 mLs (2.5 mg total) by nebulization every 6 (six) hours as needed for Wheezing or Shortness of Breath. Rescue 22 Yes Papa Peguero MD   atorvastatin (LIPITOR) 20 MG tablet Take 1 tablet (20 mg total) by mouth every evening. 22  Yes PAULA Casillas MD   blood sugar diagnostic Strp 1 strip by Misc.(Non-Drug; Combo Route) route 4 (four) times daily with meals and nightly. 10/17/22  Yes Joshua Martines MD   budesonide 1 mg/2 mL NbSp SMARTSI Vial(s) Both Nares Twice Daily 22  Yes Historical Provider   chlorthalidone (HYGROTEN) 25 MG Tab Take 1 tablet (25 mg total) by mouth once daily. 23 Yes Nakul English MD   clobetasoL (TEMOVATE) 0.05 % cream Apply topically 2 (two) times daily. 1/10/23  Yes Alen Campa, NADIA   fluticasone propion-salmeteroL 232-14 mcg/actuation AePB Inhale 1 puff into the lungs 2 (two) times daily. 22  Yes Historical Provider   fluticasone-salmeterol diskus inhaler 500-50 mcg Inhale 1 puff into the lungs 2 (two) times daily. Controller 22  Yes PAULA Casillas MD   furosemide (LASIX) 20 MG tablet 1 po qod 12/15/22  Yes PAULA Casillas MD   insulin aspart U-100 (NOVOLOG FLEXPEN U-100 INSULIN) 100 unit/mL (3 mL) InPn pen Take 10 units with breakfast and lunch, and 12 units with dinner, plus correction scale, max TDD 40 units 22  Yes Joshua Martines MD   insulin detemir U-100 (LEVEMIR FLEXTOUCH U-100 INSULN) 100 unit/mL (3 mL) InPn pen Inject 3 Units into the skin 2 (two) times daily. 3/15/22 3/15/23 Yes Joshua Martines MD   irbesartan (AVAPRO) 300 MG tablet Take 1 tablet (300 mg total) by mouth every evening. 22 Yes Nakul MELO  "MD Tameka   latanoprost 0.005 % ophthalmic solution Inject into the eye. 1 Drops Ophthalmic Every evening   Yes Historical Provider   levocetirizine (XYZAL) 5 MG tablet Take 1 tablet (5 mg total) by mouth every evening. 6/7/22 6/7/23 Yes Rosangela Isabel MD   lipase-protease-amylase 24,000-76,000-120,000 units (CREON) 24,000-76,000 -120,000 unit capsule Take 2 capsules with meals orally and 1 capsule with snacks. 6/24/22  Yes Max Rosado MD   magnesium oxide (MAG-OX) 400 mg (241.3 mg magnesium) tablet Take 400 mg by mouth once daily.   Yes Historical Provider   montelukast (SINGULAIR) 10 mg tablet TAKE 1 TABLET BY MOUTH EVERY EVENING 1/17/23  Yes PAULA Casillas MD   NEILMED SINUS RINSE REFILL Pack use as directed 8/9/22  Yes Historical Provider   pen needle, diabetic 31 gauge x 5/16" Ndle Inject 1 each into the skin 4 (four) times daily with meals and nightly. Use with insulin pen 7/5/22  Yes Joshua Martines MD   potassium chloride SA (K-DUR,KLOR-CON) 20 MEQ tablet Take 1 tablet (20 mEq total) by mouth every other day. 1/18/23  Yes Titus Hagan,    sodium chloride 3% 3 % nebulizer solution Take 4 mLs by nebulization every 6 (six) hours. 11/2/22  Yes Kayla Baker NP   vitamin D (VITAMIN D3) 1000 units Tab Take 1,000 Units by mouth once daily.   Yes Historical Provider   olopatadine (PATADAY) 0.2 % Drop Place into both eyes. 11/16/22   Historical Provider   olopatadine (PATADAY) 0.2 % Drop Apply 1 drop to eye daily as needed. 11/15/22   Historical Provider   olopatadine (PATANOL) 0.1 % ophthalmic solution Place 1 drop into both eyes 2 (two) times daily as needed. 1 Drops Ophthalmic Twice a day     Historical Provider   risedronate (ACTONEL) 35 MG tablet Take 1 tablet (35 mg total) by mouth every 7 days. 8/26/21 4/22/22  Joshua Martines MD     Anticoagulants/Antiplatelets: no anticoagulation    Allergies:   Review of patient's allergies indicates:   Allergen Reactions    Aspirin Other " (See Comments)     Asthma    TRIAD OF NASAL POLYPS, ASA  ALLERGY AND ASTHMA.        Aspirin Other (See Comments)    Nsaids (non-steroidal anti-inflammatory drug) Other (See Comments)     AVOID DUE TO TRIAD OF ASA ALLERGY, NASAL POLYPS,AND ASTHMA  AVOID DUE TO TRIAD OF ASA ALLERGY, NASAL POLYPS,AND ASTHMA    Penicillin      Other reaction(s): Rash    9/30/20: tolerates ceftriaxone    Penicillin g Other (See Comments)     Other reaction(s): Rash    9/30/20: tolerates ceftriaxone     Sedation History:  no adverse reactions    Review of Systems:   Hematological: no known coagulopathies  Respiratory: no shortness of breath  Cardiovascular: no chest pain  Gastrointestinal: no abdominal pain  Genito-Urinary: no dysuria  Musculoskeletal: negative  Neurological: no TIA or stroke symptoms         OBJECTIVE:     Vital Signs (Most Recent)  Temp: 97.2 °F (36.2 °C) (01/24/23 0800)  Pulse: 102 (01/24/23 0800)  Resp: 18 (01/24/23 0800)  BP: (!) 159/71 (01/24/23 0801)  SpO2: 97 % (01/24/23 0800)    Physical Exam:  ASA: 2  Mallampati: 2    General: no acute distress  Mental Status: alert and oriented to person, place and time  HEENT: normocephalic, atraumatic  Chest: unlabored breathing  Heart: regular heart rate  Abdomen: nondistended  Extremity: moves all extremities    Laboratory  Lab Results   Component Value Date    INR 1.1 12/28/2022       Lab Results   Component Value Date    WBC 8.71 01/06/2023    HGB 10.5 (L) 01/06/2023    HCT 34.0 (L) 01/06/2023    MCV 92 01/06/2023     01/06/2023      Lab Results   Component Value Date    GLU 92 01/06/2023     01/06/2023    K 4.7 01/06/2023     01/06/2023    CO2 28 01/06/2023    BUN 18 01/06/2023    CREATININE 0.8 01/06/2023    CALCIUM 10.3 01/06/2023    MG 1.4 (L) 11/02/2022    ALT 12 01/06/2023    AST 17 01/06/2023    ALBUMIN 3.1 (L) 01/06/2023    BILITOT 0.5 01/06/2023       ASSESSMENT/PLAN:     Sedation Plan: moderate  Patient will undergo lung mass  biopsy.    Angi Friend MD  Radiology PGY-2

## 2023-01-24 NOTE — PLAN OF CARE
Pt discharged to family. Discharge instructions given and all questions answered at this time. VSS. Site CDI. Pt transported to [facility] via wheelchair with [facility] Darling.

## 2023-01-25 VITALS
WEIGHT: 100 LBS | TEMPERATURE: 97 F | OXYGEN SATURATION: 98 % | BODY MASS INDEX: 16.66 KG/M2 | RESPIRATION RATE: 20 BRPM | DIASTOLIC BLOOD PRESSURE: 62 MMHG | SYSTOLIC BLOOD PRESSURE: 144 MMHG | HEIGHT: 65 IN | HEART RATE: 71 BPM

## 2023-01-26 LAB
ADEQUACY: ABNORMAL
FINAL PATHOLOGIC DIAGNOSIS: ABNORMAL
Lab: ABNORMAL

## 2023-01-28 LAB
DNA RANGE(S) EXAMINED NAR: NORMAL
GENE DIS ANL INTERP-IMP: POSITIVE
GENE DIS ASSESSED: NORMAL
GENE MUT TESTED BLD/T: 4.7 M/MB
MSI CA SPEC-IMP: NORMAL
REASON FOR STUDY: NORMAL
TEMPUS FUSIONADDENDUM: NORMAL
TEMPUS LCA: NORMAL
TEMPUS PERTINENTNEGATIVES: NORMAL
TEMPUS PORTAL: NORMAL
TEMPUS THERAPY1: NORMAL
TEMPUS THERAPYCOUNT: 1
TEMPUS TRIAL1: NORMAL
TEMPUS TRIAL2: NORMAL
TEMPUS TRIAL3: NORMAL
TEMPUS TRIALCOUNT: 3

## 2023-02-01 ENCOUNTER — PATIENT OUTREACH (OUTPATIENT)
Dept: HOME HEALTH SERVICES | Facility: HOSPITAL | Age: 88
End: 2023-02-01
Payer: MEDICARE

## 2023-02-02 ENCOUNTER — EXTERNAL HOME HEALTH (OUTPATIENT)
Dept: HOME HEALTH SERVICES | Facility: HOSPITAL | Age: 88
End: 2023-02-02
Payer: MEDICARE

## 2023-02-03 ENCOUNTER — OFFICE VISIT (OUTPATIENT)
Dept: HEMATOLOGY/ONCOLOGY | Facility: CLINIC | Age: 88
End: 2023-02-03
Payer: MEDICARE

## 2023-02-03 ENCOUNTER — LAB VISIT (OUTPATIENT)
Dept: LAB | Facility: HOSPITAL | Age: 88
End: 2023-02-03
Attending: HOSPITALIST
Payer: MEDICARE

## 2023-02-03 VITALS
HEART RATE: 80 BPM | WEIGHT: 100.06 LBS | DIASTOLIC BLOOD PRESSURE: 68 MMHG | BODY MASS INDEX: 16.67 KG/M2 | HEIGHT: 65 IN | SYSTOLIC BLOOD PRESSURE: 160 MMHG | RESPIRATION RATE: 18 BRPM | OXYGEN SATURATION: 94 %

## 2023-02-03 DIAGNOSIS — R80.9 TYPE 2 DIABETES MELLITUS WITH MICROALBUMINURIA, WITHOUT LONG-TERM CURRENT USE OF INSULIN: ICD-10-CM

## 2023-02-03 DIAGNOSIS — C7A.090 CARCINOID BRONCHIAL ADENOMA, RIGHT: ICD-10-CM

## 2023-02-03 DIAGNOSIS — C18.3 MALIGNANT NEOPLASM OF HEPATIC FLEXURE: Primary | ICD-10-CM

## 2023-02-03 DIAGNOSIS — I10 PRIMARY HYPERTENSION: ICD-10-CM

## 2023-02-03 DIAGNOSIS — J45.40 MODERATE PERSISTENT ASTHMA WITHOUT COMPLICATION: ICD-10-CM

## 2023-02-03 DIAGNOSIS — C18.2 MALIGNANT NEOPLASM OF ASCENDING COLON: ICD-10-CM

## 2023-02-03 DIAGNOSIS — E11.29 TYPE 2 DIABETES MELLITUS WITH MICROALBUMINURIA, WITHOUT LONG-TERM CURRENT USE OF INSULIN: ICD-10-CM

## 2023-02-03 PROBLEM — J32.8 OTHER CHRONIC SINUSITIS: Status: RESOLVED | Noted: 2019-06-17 | Resolved: 2023-02-03

## 2023-02-03 PROBLEM — G89.29 CHRONIC PAIN: Status: RESOLVED | Noted: 2018-11-08 | Resolved: 2023-02-03

## 2023-02-03 PROBLEM — M54.16 LUMBAR RADICULOPATHY: Status: RESOLVED | Noted: 2018-08-30 | Resolved: 2023-02-03

## 2023-02-03 PROBLEM — Z87.01 HISTORY OF RECURRENT PNEUMONIA: Status: RESOLVED | Noted: 2020-09-28 | Resolved: 2023-02-03

## 2023-02-03 LAB
ALBUMIN SERPL BCP-MCNC: 3.4 G/DL (ref 3.5–5.2)
ALP SERPL-CCNC: 70 U/L (ref 55–135)
ALT SERPL W/O P-5'-P-CCNC: 22 U/L (ref 10–44)
ANION GAP SERPL CALC-SCNC: 6 MMOL/L (ref 8–16)
AST SERPL-CCNC: 21 U/L (ref 10–40)
BILIRUB SERPL-MCNC: 0.4 MG/DL (ref 0.1–1)
BUN SERPL-MCNC: 36 MG/DL (ref 8–23)
CALCIUM SERPL-MCNC: 10.5 MG/DL (ref 8.7–10.5)
CEA SERPL-MCNC: 2.3 NG/ML (ref 0–5)
CHLORIDE SERPL-SCNC: 109 MMOL/L (ref 95–110)
CO2 SERPL-SCNC: 27 MMOL/L (ref 23–29)
CREAT SERPL-MCNC: 0.8 MG/DL (ref 0.5–1.4)
ERYTHROCYTE [DISTWIDTH] IN BLOOD BY AUTOMATED COUNT: 15.6 % (ref 11.5–14.5)
EST. GFR  (NO RACE VARIABLE): >60 ML/MIN/1.73 M^2
GLUCOSE SERPL-MCNC: 57 MG/DL (ref 70–110)
HCT VFR BLD AUTO: 35.9 % (ref 37–48.5)
HGB BLD-MCNC: 11.1 G/DL (ref 12–16)
IMM GRANULOCYTES # BLD AUTO: 0.01 K/UL (ref 0–0.04)
MCH RBC QN AUTO: 29.3 PG (ref 27–31)
MCHC RBC AUTO-ENTMCNC: 30.9 G/DL (ref 32–36)
MCV RBC AUTO: 95 FL (ref 82–98)
NEUTROPHILS # BLD AUTO: 3.4 K/UL (ref 1.8–7.7)
PLATELET # BLD AUTO: 249 K/UL (ref 150–450)
PMV BLD AUTO: 11.5 FL (ref 9.2–12.9)
POTASSIUM SERPL-SCNC: 4.1 MMOL/L (ref 3.5–5.1)
PROT SERPL-MCNC: 6.8 G/DL (ref 6–8.4)
RBC # BLD AUTO: 3.79 M/UL (ref 4–5.4)
SODIUM SERPL-SCNC: 142 MMOL/L (ref 136–145)
WBC # BLD AUTO: 8.15 K/UL (ref 3.9–12.7)

## 2023-02-03 PROCEDURE — 99999 PR PBB SHADOW E&M-EST. PATIENT-LVL IV: ICD-10-PCS | Mod: PBBFAC,,, | Performed by: HOSPITALIST

## 2023-02-03 PROCEDURE — 99214 OFFICE O/P EST MOD 30 MIN: CPT | Mod: PBBFAC | Performed by: HOSPITALIST

## 2023-02-03 PROCEDURE — 82378 CARCINOEMBRYONIC ANTIGEN: CPT | Performed by: HOSPITALIST

## 2023-02-03 PROCEDURE — 99214 OFFICE O/P EST MOD 30 MIN: CPT | Mod: S$PBB,,, | Performed by: HOSPITALIST

## 2023-02-03 PROCEDURE — 85027 COMPLETE CBC AUTOMATED: CPT | Performed by: HOSPITALIST

## 2023-02-03 PROCEDURE — 36415 COLL VENOUS BLD VENIPUNCTURE: CPT | Performed by: HOSPITALIST

## 2023-02-03 PROCEDURE — 80053 COMPREHEN METABOLIC PANEL: CPT | Performed by: HOSPITALIST

## 2023-02-03 PROCEDURE — 99214 PR OFFICE/OUTPT VISIT, EST, LEVL IV, 30-39 MIN: ICD-10-PCS | Mod: S$PBB,,, | Performed by: HOSPITALIST

## 2023-02-03 PROCEDURE — 99999 PR PBB SHADOW E&M-EST. PATIENT-LVL IV: CPT | Mod: PBBFAC,,, | Performed by: HOSPITALIST

## 2023-02-03 NOTE — ASSESSMENT & PLAN NOTE
- Proceed with surveillance per [facility]  - Imaging q6 months x2 years with labs and visit q3-6 months; then space to q6 months labs and visit and annual imaging through year 5  - FU 3 months with CT torso and repeat exam  - Referral for colonoscopy

## 2023-02-03 NOTE — PROGRESS NOTES
MEDICAL ONCOLOGY FOLLOW-UP VISIT.     Best Contact Phone Number(s): [phone] (home)      Cancer/Stage/TNM:    Cancer Staging   Colon cancer  Staging form: Colon and Rectum, AJCC 8th Edition  - Clinical: Stage IIB (cT4a, cN0, cM0) - Signed by Nakul English MD on 2/3/2023       Reason for visit: Sussy Costa is a 87 y.o. female with asthma, recurrent PNA and hypoxic respiratory failure, and DM2 who was hospitalized 10/2022 for large bowel obstruction. 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 CT chest 12/8/22 concerning for multiple new lung nodules. 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      Interval History:     Generally feeling well. Continues to be unable to consistently gain weight. Weight has stabilized between 95 and 100 lbs. Appetite is good; thinks she is eating well. Previously seen by nutrition. Takes Glucerna shakes inconsistently throughout the week. Rare left sided abdominal cramping. Ongoing bloating and gas. No N/V. Bowels moving well 2x per day without blood. Has some dyspnea on exertion doing household chores. Remains ambulatory and independent around the house. Mild cough over the last week with associated chest congestion. No FC.    BP's running 120s/60s at home and monitoring with her PCP. Leg swelling is improved on chorlathildone and lasix (takes lasix qod). Also on irbesartan.     Oncology History   Colon cancer   10/25/2022 Surgery    Right hemicolectomy    Moderately differentiated MMR proficient adenocarcinoma invading into the visceral peritoneum with associated PNI, LVI, and high tumor budding. Negative margins. 0/18 LN. [id number]     10/25/2022 Imaging Significant Findings    CT a/p with contrast:     1. Circumferential wall thickening of the short segment of ascending colon and apple-core  "appearance with pericolic fat stranding and free fluid, resulting in significant mass effect and dilatation of the cecum up to measuring 10 cm.  Suspect obstructing ascending colon neoplasm.    2. Diffuse gastric wall thickening and enhancement suggesting gastritis.  3. Stable innumerable pancreatic hypodense lesion.  4. Pulmonary nodules similar in size and number in comparison prior.  Metastasis not excluded.  5. Multiple hypodense lesions in the liver and a few in the spleen as well, similar to prior.  Metastasis not excluded.       12/7/2022 Imaging Significant Findings    CT chest:    1. Following resolution of left inter lobar and lower lobe bronchi mucous plugging, there is resolution of previous left lower lobe atelectasis.  However, there are many bilateral subcentimeter pulmonary nodules in both lower lobes which appear increased since a CT of the abdomen and pelvis 10/25/2022 in which there is partial imaging of the chest base.  There is a new 1.4 cm right lower lobe nodule.  2. Interval development of small volume right-sided pleural effusion.  This report was flagged in Epic as abnormal.     12/19/2022 Imaging Significant Findings    MR abdomen without evidence of intrabdominal malignancy. Ongoing chronic pancreatitis     1/24/2023 Biopsy    Biopsy of right lower lube nodule : Typical carcinoid. No mitoses noted.            Physical Exam:   BP (!) 160/68 (BP Location: Left arm, Patient Position: Sitting, BP Method: Medium (Automatic))   Pulse 80   Resp 18   Ht 5' 5" (1.651 m)   Wt 45.4 kg (100 lb 1.4 oz)   SpO2 (!) 94%   BMI 16.66 kg/m²      ECOG Performance Status: (foot note - ECOG PS provided by Eastern Cooperative Oncology Group) 1 - Symptomatic but completely ambulatory    Physical Exam  Constitutional:       General: She is not in acute distress.     Appearance: She is normal weight.   HENT:      Head: Normocephalic.      Mouth/Throat:      Mouth: Mucous membranes are moist.      Pharynx: " Oropharynx is clear.   Eyes:      General: No scleral icterus.     Conjunctiva/sclera: Conjunctivae normal.      Pupils: Pupils are equal, round, and reactive to light.   Cardiovascular:      Rate and Rhythm: Normal rate and regular rhythm.      Heart sounds: No murmur heard.  Pulmonary:      Effort: Pulmonary effort is normal. No respiratory distress.      Breath sounds: Normal breath sounds.   Abdominal:      General: There is no distension.      Palpations: Abdomen is soft.   Musculoskeletal:         General: Normal range of motion.      Cervical back: Normal range of motion and neck supple.      Right lower leg: Edema (Trace b/l edema skilled nursing to knees with resolution of prior erythema) present.      Left lower leg: Edema present.   Lymphadenopathy:      Cervical: No cervical adenopathy.   Skin:     General: Skin is warm.      Coloration: Skin is not jaundiced.   Neurological:      General: No focal deficit present.      Mental Status: She is alert and oriented to person, place, and time.      Motor: No weakness.   Psychiatric:         Mood and Affect: Mood normal.         Behavior: Behavior normal.         Thought Content: Thought content normal.         Labs:   Lab Results   Component Value Date     02/03/2023    K 4.1 02/03/2023    CREATININE 0.8 02/03/2023    WBC 8.15 02/03/2023    HGB 11.1 (L) 02/03/2023     02/03/2023    AST 21 02/03/2023    ALT 22 02/03/2023    ALKPHOS 70 02/03/2023    BILITOT 0.4 02/03/2023          Imaging:     IR Biopsy Lung w/ guidance  Narrative: EXAMINATION:  Image-guided biopsy    Procedural Personnel    Attending physician(s): Bart Carbajal    Fellow physician(s): None    Resident physician(s): None    Advanced practice provider(s): None    Pre-procedure diagnosis: Right lower lobe pulmonary nodule    Post-procedure diagnosis: Same    Indication: Histopathologic diagnosis    Previous biopsy of same target (QCDR): No    Additional clinical history: None    Complications:  No immediate complications.    TECHNIQUE:  - Percutaneous CT-guided coaxial core needle lung biopsy    FINDINGS:  Pre-procedure    Reference imaging for biopsy target: None    Consent: Informed consent for the procedure was obtained and time-out was performed prior to the procedure.    Preparation: The site was prepared and draped using maximal sterile barrier technique including cutaneous antisepsis.    Anesthesia/sedation    Level of anesthesia/sedation: Moderate sedation (conscious sedation)    Anesthesia/sedation administered by: Nurse or other independent trained observer    Total intra-service sedation time (minutes): 30    Biopsy    Local anesthesia was administered. Under imaging guidance as stated in the procedure summary, the biopsy needle was advanced to the target and biopsy was performed.    Coaxial needle: 19 gauge    Core needle biopsy device: Oravel    Core needle size: 20 gauge    Number of core specimens: 8    On-site biopsy touch preparation: Yes    Additional sampling recommendations: None    Preliminary assessment of sample adequacy: Adequate    Needle removal    The biopsy needle was removed and a sterile dressing was applied.    Tract embolization: None    Contrast    Contrast agent: None    Contrast volume (mL): 0    Radiation Dose    CT dose length product ( mGy-cm): 343.18    Additional Details    Additional description of procedure: None    Equipment details: None    Specimens removed: Biopsy samples as detailed above    Estimated blood loss (mL): Less than 10    Standardized report: [email or web address]    Attestation    Signer name: Bart Carbajal    I attest that I was present for the entire procedure. I reviewed the stored images and agree with the report as written.  Impression: Image-guided biopsy of right lower lobe pulmonary nodule.    Plan:    Specimen(s) sent for evaluation.    _______________________________________________________________    Electronically signed by: Bart  Raven  Date:    01/27/2023  Time:    15:31            Diagnoses:       1. Malignant neoplasm of hepatic flexure    2. Carcinoid bronchial adenoma, right    3. Moderate persistent asthma without complication    4. Primary hypertension    5. Type 2 diabetes mellitus with microalbuminuria, without long-term current use of insulin              Assessment and Plan:     1. Malignant neoplasm of hepatic flexure  Overview:  Right sided colon cancer diagnosed in setting of LBO requiring urgent hemicolectomy 10/25/2022 with path showing pT4aN0 disease. CT chest 12/7/22 with possible lung metastases and MR liver with indeterminate liver lesions. 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 Patient elected for surveillance.      Assessment & Plan:  - Proceed with surveillance per [facility]CCN  - Imaging q6 months x2 years with labs and visit q3-6 months; then space to q6 months labs and visit and annual imaging through year 5  - FU 3 months with CT torso and repeat exam  - Referral for colonoscopy    Orders:  -     Ambulatory referral/consult to Endo Procedure ; Future; Expected date: 02/04/2023  -     CT Chest Abdomen Pelvis With Contrast; Future; Expected date: 02/03/2023    2. Carcinoid bronchial adenoma, right  Assessment & Plan:  - Referral to radiation oncology for review with thoracics multidisciplinary team    Orders:  -     Ambulatory referral/consult to Radiation Oncology; Future; Expected date: 02/10/2023    3. Moderate persistent asthma without complication  Overview:  Followed by Dr. Peguero. Home medications include Advair and montelukast and albuterol.    Assessment & Plan:  - Con't home medications      4. Primary hypertension  Overview:  Home medications include chlorthalidone and irbesartan with every other day lasix    Assessment & Plan:  - Con't home medications      5. Type 2 diabetes mellitus with microalbuminuria, without  long-term current use of insulin  Overview:  Home regimen includes levemir and Novolog.     Assessment & Plan:  - Con't home medications                 Route Chart for Scheduling    Med Onc Chart Routing      Follow up with physician 3 months.   Follow up with DOMONIQUE    Infusion scheduling note    Injection scheduling note    Labs CBC, CMP and CEA   Lab interval:     Imaging CT chest abdomen pelvis      Pharmacy appointment    Other referrals          Therapy Plan Information  Medications  denosumab (PROLIA) injection 60 mg  60 mg, Subcutaneous, Every 26 weeks       Nakul English MD  Hematology/Oncology  [facility]

## 2023-02-03 NOTE — PATIENT INSTRUCTIONS
Recent lung biopsy did not show metastatic colon cancer but did show 'typical carcionoid tumor' of the lung. These are typically low-grade tumors that can be managed with local treatment options such as radiation or surgery.    For typical carcinoid  - Referral to radiation oncology to consider possible local treatment options    For colon cancer  - Continue surveillance  - Follow up in 3 months with repeat imaging, exam and blood work  - Recommend colonoscopy at next convenient time; referral placed

## 2023-02-03 NOTE — Clinical Note
Dr. Zamorano - I saw Ms. Costa for recent diagnosis of stage II colon cancer. Imaging showed a right lower lobe nodule we just biopsied as typical carcinoid. She is relatively asymptotic. I put in a referral to you to see if she would benefit from SBRT vs observation. Thanks! Nakul

## 2023-02-13 ENCOUNTER — INFUSION (OUTPATIENT)
Dept: INFECTIOUS DISEASES | Facility: HOSPITAL | Age: 88
End: 2023-02-13
Attending: INTERNAL MEDICINE
Payer: MEDICARE

## 2023-02-13 VITALS
TEMPERATURE: 97 F | OXYGEN SATURATION: 99 % | DIASTOLIC BLOOD PRESSURE: 70 MMHG | WEIGHT: 102.31 LBS | BODY MASS INDEX: 17.02 KG/M2 | HEART RATE: 76 BPM | SYSTOLIC BLOOD PRESSURE: 157 MMHG | RESPIRATION RATE: 18 BRPM

## 2023-02-13 DIAGNOSIS — M81.0 SENILE OSTEOPOROSIS: Primary | ICD-10-CM

## 2023-02-13 PROCEDURE — 63600175 PHARM REV CODE 636 W HCPCS: Mod: JG | Performed by: INTERNAL MEDICINE

## 2023-02-13 PROCEDURE — 96372 THER/PROPH/DIAG INJ SC/IM: CPT

## 2023-02-13 RX ADMIN — DENOSUMAB 60 MG: 60 INJECTION SUBCUTANEOUS at 11:02

## 2023-02-13 NOTE — PROGRESS NOTES
Pt received prolia injection to LEFT arm SQ, pt takes calcium and  VIT D, denies dental surgery < 3 months, pt tolerated well & left in NAD

## 2023-02-15 ENCOUNTER — OFFICE VISIT (OUTPATIENT)
Dept: RADIATION ONCOLOGY | Facility: CLINIC | Age: 88
End: 2023-02-15
Payer: MEDICARE

## 2023-02-15 VITALS
RESPIRATION RATE: 18 BRPM | DIASTOLIC BLOOD PRESSURE: 82 MMHG | HEART RATE: 91 BPM | WEIGHT: 102.31 LBS | TEMPERATURE: 98 F | BODY MASS INDEX: 17.02 KG/M2 | SYSTOLIC BLOOD PRESSURE: 187 MMHG | OXYGEN SATURATION: 95 %

## 2023-02-15 DIAGNOSIS — C7A.090 CARCINOID BRONCHIAL ADENOMA, RIGHT: ICD-10-CM

## 2023-02-15 PROCEDURE — 99204 PR OFFICE/OUTPT VISIT, NEW, LEVL IV, 45-59 MIN: ICD-10-PCS | Mod: S$PBB,,, | Performed by: RADIOLOGY

## 2023-02-15 PROCEDURE — 99204 OFFICE O/P NEW MOD 45 MIN: CPT | Mod: S$PBB,,, | Performed by: RADIOLOGY

## 2023-02-15 PROCEDURE — 99999 PR PBB SHADOW E&M-EST. PATIENT-LVL III: ICD-10-PCS | Mod: PBBFAC,,, | Performed by: RADIOLOGY

## 2023-02-15 PROCEDURE — 99213 OFFICE O/P EST LOW 20 MIN: CPT | Mod: PBBFAC | Performed by: RADIOLOGY

## 2023-02-15 PROCEDURE — 99999 PR PBB SHADOW E&M-EST. PATIENT-LVL III: CPT | Mod: PBBFAC,,, | Performed by: RADIOLOGY

## 2023-02-15 RX ORDER — PEN NEEDLE, DIABETIC 30 GX3/16"
NEEDLE, DISPOSABLE MISCELLANEOUS
Qty: 200 EACH | Refills: 11 | Status: SHIPPED | OUTPATIENT
Start: 2023-02-15 | End: 2024-02-10 | Stop reason: SDUPTHER

## 2023-02-15 NOTE — PROGRESS NOTES
"2/15/2023    Radiation Oncology Consultation    Assessment   This is an 87 y.o. female with h/o Stage IIB (pT4a pN0 M0) Right colon adenocarcinoma s/p Right colectomy by Dr. Holman 10/25/22. She was evaluated by Dr. English in Medical Oncology and is undergoing surveillance for her colon cancer. Re-staging CT Chest 12/8/22 demonstrated stable known bilateral pulmonary nodules but interval development of a 1.4 cm RLL "nodule". CT guided biopsy of a SEPARATE RLL stable nodule 1/24/23 revealed typical carcinoid without mitoses or necrosis on the sample. She is referred for consideration of treatment options.     In reviewing her past imaging, she has had pulmonary nodules dating back to CT Chest in 2011, including the RLL nodule that was biopsied and confirmed as typical carcinoid. These have grown <0.3 mm over that period, so I do not believe that any intervention is indicated for them.    The "new 1.4 right lower lobe nodule" noted on CT Chest 12/8/22 is NOT what was biopsied. The interval development of the opacity and overall appearance of her lung on the December 2022 scan is more in line with acute infection/inflammation rather than concern for malignancy. I recommend repeat imaging as already scheduled by Dr. English in May 2023 to assess for resolution of these changes.          Plan   1) No indication for radiation; pulmonary carcinoid with very slow growth over >10 years. The area noted as new on CT Chest 12/2022 more likely benign and can be followed for resolution on next re-staging scans. If it persists on next imaging, would recommend presentation of her case at the Thoracic Multidisciplinary conference on Wednesdays at noon.              CHIEF COMPLAINT: Pulmonary carcinoid    HPI/Focused ROS: Ms. Costa is an 88 y/o female with h/o Stage IIB (pT4a pN0 M0) Right colon adenocarcinoma s/p Right colectomy by Dr. Holman 10/25/22. She was evaluated by Dr. English in Medical Oncology and is undergoing " "surveillance for her colon cancer. Re-staging CT Chest 12/8/22 demonstrated stable known bilateral pulmonary nodules but interval development of a 1.4 cm RLL "nodule". CT guided biopsy of a SEPARATE, stable nodule 1/24/23 revealed typical carcinoid without mitoses or necrosis on the sample. She is referred for consideration of treatment options.     Today in clinic, the patient is accompanied by her . She denies dyspnea, cough, chest pain, diarrhea, or flushing.       Is the patient female between ages 15-55:  no    Does the patient have a CIED:  no    Prior Radiation History: None      Past Medical History:   Diagnosis Date    Asthma     Cyst of pancreas     liver    Diabetes mellitus type II     Eczema of hand     Glaucoma     History of recurrent pneumonia 9/28/2020    Hyperlipidemia     Hypertension     Lichen planus     gums    Osteoporosis     Pulmonary nodules        Past Surgical History:   Procedure Laterality Date    BRONCHOSCOPY N/A 5/13/2022    Procedure: Bronchoscopy;  Surgeon: [facility] Diagnostic Provider;  Location: [facility] OR [address] [place] [address];  Service: Anesthesiology;  Laterality: N/A;    CATARACT EXTRACTION, BILATERAL      CHOLECYSTECTOMY      COLECTOMY, RIGHT Right 10/25/2022    Procedure: COLECTOMY, RIGHT;  Surgeon: Jass Holman MD;  Location: [facility] OR [address] [place] [address];  Service: Colon and Rectal;  Laterality: Right;    ENDOSCOPIC ULTRASOUND OF UPPER GASTROINTESTINAL TRACT N/A 4/22/2022    Procedure: ULTRASOUND, UPPER GI TRACT, ENDOSCOPIC;  Surgeon: Max Rosado MD;  Location: [facility] ([address] [place] [address]);  Service: Endoscopy;  Laterality: N/A;  urgent EUS (linear) for abnormal CT scan with dilated PD and increased cyst of pancreas cyst.  pap 44 mmHg  4/13:fully vaccinated. instructions via portal-SC    EPIDURAL STEROID INJECTION INTO LUMBAR SPINE N/A 11/8/2018    Procedure: Injection-steroid-epidural-lumbar L5-S1;  Surgeon: Nicci Osorio Jr., MD;  Location: [facility] PAIN [facility]GT;  Service: Pain Management;  " Laterality: N/A;    FUNCTIONAL ENDOSCOPIC SINUS SURGERY (FESS) USING COMPUTER-ASSISTED NAVIGATION N/A 6/17/2019    Procedure: FESS, USING COMPUTER-ASSISTED NAVIGATION;  Surgeon: Rosangela Isabel MD;  Location: [facility] OR;  Service: ENT;  Laterality: N/A;  video    HYSTERECTOMY      nasal polyps         Social History     Tobacco Use    Smoking status: Never     Passive exposure: Never    Smokeless tobacco: Never   Substance Use Topics    Alcohol use: Yes     Comment: socially    Drug use: No       Cancer-related family history is not on file.    Current Outpatient Medications on File Prior to Visit   Medication Sig Dispense Refill    albuterol (PROVENTIL) 2.5 mg /3 mL (0.083 %) nebulizer solution Take 3 mLs (2.5 mg total) by nebulization every 6 (six) hours as needed for Wheezing or Shortness of Breath. Rescue 60 each 11    atorvastatin (LIPITOR) 20 MG tablet Take 1 tablet (20 mg total) by mouth every evening. 90 tablet 0    chlorthalidone (HYGROTEN) 25 MG Tab Take 1 tablet (25 mg total) by mouth once daily. 30 tablet 11    furosemide (LASIX) 20 MG tablet 1 po qod 15 tablet 3    levocetirizine (XYZAL) 5 MG tablet Take 1 tablet (5 mg total) by mouth every evening. 90 tablet 3    lipase-protease-amylase 24,000-76,000-120,000 units (CREON) 24,000-76,000 -120,000 unit capsule Take 2 capsules with meals orally and 1 capsule with snacks. 270 capsule 11    magnesium oxide (MAG-OX) 400 mg (241.3 mg magnesium) tablet Take 400 mg by mouth once daily.      montelukast (SINGULAIR) 10 mg tablet TAKE 1 TABLET BY MOUTH EVERY EVENING 30 tablet 6    vitamin D (VITAMIN D3) 1000 units Tab Take 1,000 Units by mouth once daily.      ACCU-CHEK FASTCLIX LANCET DRUM Mis CHECK BLOOD GLUCOSE FOUR TIMES DAILY 1 each 3    ACCU-CHEK GUIDE TEST STRIPS Strp TEST FOUR TIMES DAILY WITH MEALS AND NIGHTLY 200 strip 11    blood sugar diagnostic Strp 1 strip by Misc.(Non-Drug; Combo Route) route 4 (four) times daily with meals and nightly. 200  "strip 11    budesonide 1 mg/2 mL [facility] SMARTSI Vial(s) Both Nares Twice Daily      clobetasoL (TEMOVATE) 0.05 % cream Apply topically 2 (two) times daily. 60 g 2    fluticasone propion-salmeteroL 232-14 mcg/actuation AePB Inhale 1 puff into the lungs 2 (two) times daily.      fluticasone-salmeterol diskus inhaler 500-50 mcg Inhale 1 puff into the lungs 2 (two) times daily. Controller 180 each 3    insulin aspart U-100 (NOVOLOG FLEXPEN U-100 INSULIN) 100 unit/mL (3 mL) InPn pen Take 10 units with breakfast and lunch, and 12 units with dinner, plus correction scale, max TDD 40 units 36 mL 3    insulin detemir U-100 (LEVEMIR FLEXTOUCH U-100 INSULN) 100 unit/mL (3 mL) InPn pen Inject 3 Units into the skin 2 (two) times daily. 15 mL 11    irbesartan (AVAPRO) 300 MG tablet Take 1 tablet (300 mg total) by mouth every evening. 90 tablet 3    latanoprost 0.005 % ophthalmic solution Inject into the eye. 1 Drops Ophthalmic Every evening      NEILMED SINUS RINSE REFILL Pack use as directed      olopatadine (PATADAY) 0.2 % Drop Place into both eyes.      olopatadine (PATADAY) 0.2 % Drop Apply 1 drop to eye daily as needed.      olopatadine (PATANOL) 0.1 % ophthalmic solution Place 1 drop into both eyes 2 (two) times daily as needed. 1 Drops Ophthalmic Twice a day       potassium chloride SA (K-DUR,KLOR-CON) 20 MEQ tablet Take 1 tablet (20 mEq total) by mouth every other day. 30 tablet 3    sodium chloride 3% 3 % nebulizer solution Take 4 mLs by nebulization every 6 (six) hours. 1 mL 0    [DISCONTINUED] pen needle, diabetic 31 gauge x 5/16" Ndle Inject 1 each into the skin 4 (four) times daily with meals and nightly. Use with insulin pen 200 each 11    [DISCONTINUED] risedronate (ACTONEL) 35 MG tablet Take 1 tablet (35 mg total) by mouth every 7 days. 12 tablet 3     No current facility-administered medications on file prior to visit.       Review of patient's allergies indicates:   Allergen Reactions    Aspirin Other (See " Comments)     Asthma    TRIAD OF NASAL POLYPS, ASA  ALLERGY AND ASTHMA.        Aspirin Other (See Comments)    Nsaids (non-steroidal anti-inflammatory drug) Other (See Comments)     AVOID DUE TO TRIAD OF ASA ALLERGY, NASAL POLYPS,AND ASTHMA  AVOID DUE TO TRIAD OF ASA ALLERGY, NASAL POLYPS,AND ASTHMA    Penicillin      Other reaction(s): Rash    9/30/20: tolerates ceftriaxone    Penicillin g Other (See Comments)     Other reaction(s): Rash    9/30/20: tolerates ceftriaxone         Vital Signs: BP (!) 187/82   Pulse 91   Temp 98 °F (36.7 °C)   Resp 18   Wt 46.4 kg (102 lb 4.7 oz)   SpO2 95%   BMI 17.02 kg/m²     ECOG Performance Status: 2 - Ambulates, capable of self care only    Physical Exam  Vitals reviewed.   Constitutional:       Appearance: Normal appearance.   HENT:      Head: Normocephalic and atraumatic.   Eyes:      General: No scleral icterus.     Extraocular Movements: Extraocular movements intact.   Pulmonary:      Effort: No accessory muscle usage or respiratory distress.   Abdominal:      General: There is no distension.   Musculoskeletal:         General: Normal range of motion.      Cervical back: Normal range of motion and neck supple.   Lymphadenopathy:      Cervical: No cervical adenopathy.   Skin:     General: Skin is dry.      Coloration: Skin is not jaundiced.   Neurological:      Mental Status: She is alert and oriented to person, place, and time.      Cranial Nerves: No cranial nerve deficit.   Psychiatric:         Mood and Affect: Mood and affect normal.         Judgment: Judgment normal.        Labs:    Imaging: I have personally reviewed the patient's available images and reports and summarized pertinent findings above in HPI.     Pathology: I have personally reviewed the patient's available pathology and summarized pertinent findings above in HPI.       - Thank you for allowing me to participate in the care of your patient.    Surya Zamorano MD, PhD

## 2023-02-22 ENCOUNTER — TELEPHONE (OUTPATIENT)
Dept: INTERNAL MEDICINE | Facility: CLINIC | Age: 88
End: 2023-02-22
Payer: MEDICARE

## 2023-02-22 NOTE — TELEPHONE ENCOUNTER
Pt called in to give bp readings as directed .    1//70  1//68  1//61  1/-69    2/1-126-63  2/3-109/60  2/5-125/66  2/8-108/54  2//63  2//68  2//60  2//65  2//61  2/-57  2//58

## 2023-02-22 NOTE — TELEPHONE ENCOUNTER
----- Message from Flower Flores sent at 2/22/2023  1:31 PM CST -----  Contact: [phone]  Please call the patient with her BP reading for the last couple of weeks, patient says she has tried to used the portal however it is not working .

## 2023-03-01 ENCOUNTER — OFFICE VISIT (OUTPATIENT)
Dept: INTERNAL MEDICINE | Facility: CLINIC | Age: 88
End: 2023-03-01
Payer: MEDICARE

## 2023-03-01 VITALS
HEIGHT: 65 IN | TEMPERATURE: 98 F | OXYGEN SATURATION: 97 % | SYSTOLIC BLOOD PRESSURE: 116 MMHG | WEIGHT: 100.19 LBS | BODY MASS INDEX: 16.69 KG/M2 | DIASTOLIC BLOOD PRESSURE: 56 MMHG | RESPIRATION RATE: 19 BRPM | HEART RATE: 70 BPM

## 2023-03-01 DIAGNOSIS — J45.40 MODERATE PERSISTENT ASTHMA WITHOUT COMPLICATION: Primary | ICD-10-CM

## 2023-03-01 DIAGNOSIS — C7A.090 CARCINOID BRONCHIAL ADENOMA, RIGHT: ICD-10-CM

## 2023-03-01 DIAGNOSIS — I10 PRIMARY HYPERTENSION: ICD-10-CM

## 2023-03-01 DIAGNOSIS — R80.9 TYPE 2 DIABETES MELLITUS WITH MICROALBUMINURIA, WITHOUT LONG-TERM CURRENT USE OF INSULIN: ICD-10-CM

## 2023-03-01 DIAGNOSIS — I48.91 ATRIAL FIBRILLATION, UNSPECIFIED TYPE: ICD-10-CM

## 2023-03-01 DIAGNOSIS — I70.0 AORTIC ATHEROSCLEROSIS: ICD-10-CM

## 2023-03-01 DIAGNOSIS — K86.1 OTHER CHRONIC PANCREATITIS: ICD-10-CM

## 2023-03-01 DIAGNOSIS — E11.29 TYPE 2 DIABETES MELLITUS WITH MICROALBUMINURIA, WITHOUT LONG-TERM CURRENT USE OF INSULIN: ICD-10-CM

## 2023-03-01 PROCEDURE — 99214 OFFICE O/P EST MOD 30 MIN: CPT | Mod: S$PBB,GC,, | Performed by: STUDENT IN AN ORGANIZED HEALTH CARE EDUCATION/TRAINING PROGRAM

## 2023-03-01 PROCEDURE — 99999 PR PBB SHADOW E&M-EST. PATIENT-LVL V: CPT | Mod: PBBFAC,GC,, | Performed by: STUDENT IN AN ORGANIZED HEALTH CARE EDUCATION/TRAINING PROGRAM

## 2023-03-01 PROCEDURE — 99999 PR PBB SHADOW E&M-EST. PATIENT-LVL V: ICD-10-PCS | Mod: PBBFAC,GC,, | Performed by: STUDENT IN AN ORGANIZED HEALTH CARE EDUCATION/TRAINING PROGRAM

## 2023-03-01 PROCEDURE — 99214 PR OFFICE/OUTPT VISIT, EST, LEVL IV, 30-39 MIN: ICD-10-PCS | Mod: S$PBB,GC,, | Performed by: STUDENT IN AN ORGANIZED HEALTH CARE EDUCATION/TRAINING PROGRAM

## 2023-03-01 PROCEDURE — 99215 OFFICE O/P EST HI 40 MIN: CPT | Mod: PBBFAC,PO | Performed by: STUDENT IN AN ORGANIZED HEALTH CARE EDUCATION/TRAINING PROGRAM

## 2023-03-01 RX ORDER — POTASSIUM CHLORIDE 20 MEQ/1
20 TABLET, EXTENDED RELEASE ORAL DAILY PRN
Qty: 30 TABLET | Refills: 3 | Status: ON HOLD | OUTPATIENT
Start: 2023-03-01 | End: 2023-06-26 | Stop reason: HOSPADM

## 2023-03-01 RX ORDER — FUROSEMIDE 20 MG/1
20 TABLET ORAL DAILY PRN
Qty: 15 TABLET | Refills: 3 | Status: SHIPPED | OUTPATIENT
Start: 2023-03-01 | End: 2023-05-04 | Stop reason: ALTCHOICE

## 2023-03-01 NOTE — PROGRESS NOTES
Subjective     Chief Complaint: 1m F/U    History of Present Illness:  Ms. Sussy Costa is a 87 y.o. female, patient of Dr. Casillas, with HTN, asthma, recurrent PNA and hypoxic respiratory failure, and DM2 who was hospitalized 10/2022 for large bowel obstruction. 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 CT chest 12/8/22 concerning for multiple new lung nodules. IR biopsy demonstrated carcinoid adenoma and plan for observation of both lung and colon CA. Seen by Rad Onc with plan for observation.      HTN  On irbesartan and chlorthalidone. CMP reviewed. BP controlled on todays visit. Patient sent in blood pressure logs with readings well within appropriate range and denies any hypertensive or hypotensive symptoms. Reported hypertension noted on vitals taken at recent visits but given home readings will make no changes. Will continue chlorthalidone and irbesartan. PRN lasix.      LE swelling  Patient reports taking every other day lasix. Patient reports improved PO intake with meals. Patient denies any other extremity swelling and reports normal nocturnal urination with no significant sleep disturbance. CMP reviewed from last week slightly elevated BUN but stable K and Cr.   Since last visit reported that this is resolved and will move lasix to PRN with PRN potassium as well.      DM  Previously completed 72 hour CGM with endocrine. Reported fluctuating appetite. Reports occasional Glucerna or boost intake. Reported less hypoglycemic episodes with 1-2 in the last 3 months. Denies any symptoms with each episode.   Reported high to 300s shortly after granddaughter birthday. Patient reported only low was 85 and roughly 2 weeks ago with no symptoms. Better controlled.      Colon CA  Planning for IR lung biopsy next week. If positive plan for adjuvant chemo and if not is complete from therapy standpoint. BM have improved and  are more frequent than before. Occasional incontinence but plans for PRN imodium per CRS recommendations. No reported stomach pain but occasional cramps that improve with eating.      Cough/asthma  Has recently had stable coughing and clear mucus production. Reported seeing ENT and using nasal nebulized solution as well as xyzal and singular. Has not had to use nebulizer frequently and will attempt some saline nasal spray for irritation.      AF  1x EKG demonstrating AF. Resolved. Likely in the setting of acute illness. CTM.     Aortic atherosclerosis  Continue statin therapy    Review of Systems   Constitutional:  Positive for malaise/fatigue (improving). Negative for chills and fever.   HENT:  Positive for congestion. Negative for sore throat.    Eyes:  Negative for blurred vision and pain.   Respiratory:  Positive for cough (stable). Negative for shortness of breath and wheezing.    Cardiovascular:  Negative for chest pain, palpitations, orthopnea, claudication, leg swelling and PND.   Gastrointestinal:  Negative for abdominal pain, blood in stool, constipation, diarrhea, melena, nausea and vomiting.   Genitourinary:  Negative for dysuria, frequency and hematuria.   Musculoskeletal:  Negative for falls.   Skin:  Negative for rash.   Neurological:  Negative for dizziness, weakness and headaches.   Psychiatric/Behavioral: Negative.       PAST HISTORY:     Past Medical History:   Diagnosis Date    Asthma     Cyst of pancreas     liver    Diabetes mellitus type II     Eczema of hand     Glaucoma     History of recurrent pneumonia 9/28/2020    Hyperlipidemia     Hypertension     Lichen planus     gums    Osteoporosis     Pulmonary nodules        Past Surgical History:   Procedure Laterality Date    BRONCHOSCOPY N/A 5/13/2022    Procedure: Bronchoscopy;  Surgeon: Tina Diagnostic Provider;  Location: [facility] OR [address] [place] [address];  Service: Anesthesiology;  Laterality: N/A;    CATARACT EXTRACTION, BILATERAL      CHOLECYSTECTOMY       COLECTOMY, RIGHT Right 10/25/2022    Procedure: COLECTOMY, RIGHT;  Surgeon: Jass Holman MD;  Location: [facility] OR 2ND FLR;  Service: Colon and Rectal;  Laterality: Right;    ENDOSCOPIC ULTRASOUND OF UPPER GASTROINTESTINAL TRACT N/A 2022    Procedure: ULTRASOUND, UPPER GI TRACT, ENDOSCOPIC;  Surgeon: Max Rosado MD;  Location: [facility] ENDO (2ND FLR);  Service: Endoscopy;  Laterality: N/A;  urgent EUS (linear) for abnormal CT scan with dilated PD and increased cyst of pancreas cyst.  pap 44 mmHg  :fully vaccinated. instructions via portal-SC    EPIDURAL STEROID INJECTION INTO LUMBAR SPINE N/A 2018    Procedure: Injection-steroid-epidural-lumbar L5-S1;  Surgeon: Nicci Osorio Jr., MD;  Location: [facility] PAIN MGT;  Service: Pain Management;  Laterality: N/A;    FUNCTIONAL ENDOSCOPIC SINUS SURGERY (FESS) USING COMPUTER-ASSISTED NAVIGATION N/A 2019    Procedure: FESS, USING COMPUTER-ASSISTED NAVIGATION;  Surgeon: Rosangela Isabel MD;  Location: [facility] OR;  Service: ENT;  Laterality: N/A;  video    HYSTERECTOMY      nasal polyps         Family History   Problem Relation Age of Onset    Heart disease Father     Heart disease Brother     Hypertension Brother          MEDICATIONS & ALLERGIES:     Current Outpatient Medications on File Prior to Visit   Medication Sig    ACCU-CHEK FASTCLIX LANCET DRUM Misc CHECK BLOOD GLUCOSE FOUR TIMES DAILY    ACCU-CHEK GUIDE TEST STRIPS Strp TEST FOUR TIMES DAILY WITH MEALS AND NIGHTLY    albuterol (PROVENTIL) 2.5 mg /3 mL (0.083 %) nebulizer solution Take 3 mLs (2.5 mg total) by nebulization every 6 (six) hours as needed for Wheezing or Shortness of Breath. Rescue    atorvastatin (LIPITOR) 20 MG tablet Take 1 tablet (20 mg total) by mouth every evening.    blood sugar diagnostic Strp 1 strip by Misc.(Non-Drug; Combo Route) route 4 (four) times daily with meals and nightly.    budesonide 1 mg/2 mL NbSp SMARTSI Vial(s) Both Nares Twice Daily     "chlorthalidone (HYGROTEN) 25 MG Tab Take 1 tablet (25 mg total) by mouth once daily.    clobetasoL (TEMOVATE) 0.05 % cream Apply topically 2 (two) times daily.    fluticasone propion-salmeteroL 232-14 mcg/actuation AePB Inhale 1 puff into the lungs 2 (two) times daily.    fluticasone-salmeterol diskus inhaler 500-50 mcg Inhale 1 puff into the lungs 2 (two) times daily. Controller    furosemide (LASIX) 20 MG tablet 1 po qod    insulin aspart U-100 (NOVOLOG FLEXPEN U-100 INSULIN) 100 unit/mL (3 mL) InPn pen Take 10 units with breakfast and lunch, and 12 units with dinner, plus correction scale, max TDD 40 units    insulin detemir U-100 (LEVEMIR FLEXTOUCH U-100 INSULN) 100 unit/mL (3 mL) InPn pen Inject 3 Units into the skin 2 (two) times daily.    irbesartan (AVAPRO) 300 MG tablet Take 1 tablet (300 mg total) by mouth every evening.    latanoprost 0.005 % ophthalmic solution Inject into the eye. 1 Drops Ophthalmic Every evening    levocetirizine (XYZAL) 5 MG tablet Take 1 tablet (5 mg total) by mouth every evening.    lipase-protease-amylase 24,000-76,000-120,000 units (CREON) 24,000-76,000 -120,000 unit capsule Take 2 capsules with meals orally and 1 capsule with snacks.    magnesium oxide (MAG-OX) 400 mg (241.3 mg magnesium) tablet Take 400 mg by mouth once daily.    montelukast (SINGULAIR) 10 mg tablet TAKE 1 TABLET BY MOUTH EVERY EVENING    NEILMED SINUS RINSE REFILL Pack use as directed    olopatadine (PATADAY) 0.2 % Drop Place into both eyes.    olopatadine (PATADAY) 0.2 % Drop Apply 1 drop to eye daily as needed.    olopatadine (PATANOL) 0.1 % ophthalmic solution Place 1 drop into both eyes 2 (two) times daily as needed. 1 Drops Ophthalmic Twice a day     pen needle, diabetic 31 gauge x 5/16" Ndle Use with insulin pen 5 times a day    potassium chloride SA (K-DUR,KLOR-CON) 20 MEQ tablet Take 1 tablet (20 mEq total) by mouth every other day.    sodium chloride 3% 3 % nebulizer solution Take 4 mLs by nebulization " "every 6 (six) hours.    vitamin D (VITAMIN D3) 1000 units Tab Take 1,000 Units by mouth once daily.    [DISCONTINUED] risedronate (ACTONEL) 35 MG tablet Take 1 tablet (35 mg total) by mouth every 7 days.     No current facility-administered medications on file prior to visit.       Review of patient's allergies indicates:   Allergen Reactions    Aspirin Other (See Comments)     Asthma    TRIAD OF NASAL POLYPS, ASA  ALLERGY AND ASTHMA.        Aspirin Other (See Comments)    Nsaids (non-steroidal anti-inflammatory drug) Other (See Comments)     AVOID DUE TO TRIAD OF ASA ALLERGY, NASAL POLYPS,AND ASTHMA  AVOID DUE TO TRIAD OF ASA ALLERGY, NASAL POLYPS,AND ASTHMA    Penicillin      Other reaction(s): Rash    9/30/20: tolerates ceftriaxone    Penicillin g Other (See Comments)     Other reaction(s): Rash    9/30/20: tolerates ceftriaxone       OBJECTIVE:     Vital Signs:  Vitals:    03/01/23 1318   Pulse: 70   Resp: 19   Temp: 98.1 °F (36.7 °C)   TempSrc: Temporal   SpO2: 97%   Weight: 45.5 kg (100 lb 3.2 oz)   Height: 5' 5" (1.651 m)       Body mass index is 16.67 kg/m².     Physical Exam   Constitutional: She is oriented to person, place, and time. normal appearance. No distress. She does not appear ill.   HENT:   Mouth/Throat: Mucous membranes are moist.   Eyes: Pupils are equal, round, and reactive to light. No scleral icterus.   Cardiovascular: Normal rate, regular rhythm, normal heart sounds and normal pulses.   No murmur heard.Pulmonary:      Effort: Pulmonary effort is normal. No respiratory distress.      Breath sounds: Normal breath sounds. No wheezing or rales.     Abdominal: Soft. Normal appearance. She exhibits no distension. There is no abdominal tenderness. There is no guarding.   Musculoskeletal:         General: Normal range of motion.      Right lower leg: No edema.      Left lower leg: No edema.   Neurological: She is alert and oriented to person, place, and time. She displays no weakness.   Skin: Skin " is warm and dry. Capillary refill takes less than 2 seconds. No jaundice.   Psychiatric: Her behavior is normal. Mood, judgment and thought content normal.   Nursing note and vitals reviewed.     Laboratory  Lab Results   Component Value Date    WBC 8.15 02/03/2023    HGB 11.1 (L) 02/03/2023    HCT 35.9 (L) 02/03/2023    MCV 95 02/03/2023     02/03/2023     @[id number](GLU,NA,K,Cl,CO2,BUN,Creatinine,Calcium,MG)@  Lab Results   Component Value Date    INR 1.1 12/28/2022    INR 1.2 07/05/2022     Lab Results   Component Value Date    HGBA1C 6.9 (H) 11/30/2022     No results for input(s): POCTGLUCOSE in the last 72 hours.    Diagnostic Results:  Labs: Reviewed    ASSESSMENT & PLAN:   Ms. Sussy Costa is a 87 y.o. female    Sussy was seen today for 1m f/u.    Diagnoses and all orders for this visit:    Moderate persistent asthma without complication    Primary hypertension    Carcinoid bronchial adenoma, right    Type 2 diabetes mellitus with microalbuminuria, without long-term current use of insulin    Other chronic pancreatitis    Atrial fibrillation, unspecified type    Aortic atherosclerosis    Other orders  -     furosemide (LASIX) 20 MG tablet; Take 1 tablet (20 mg total) by mouth daily as needed (for leg swelling). 1 po PRN  -     potassium chloride SA (K-DUR,KLOR-CON) 20 MEQ tablet; Take 1 tablet (20 mEq total) by mouth daily as needed (when lasix is taken).       Follow up in 6 months for routine visit.         Titus Hagan D.O.  PGY-3 Internal Medicine  [facility]  [address]  [place] [address]

## 2023-03-10 ENCOUNTER — DOCUMENT SCAN (OUTPATIENT)
Dept: HOME HEALTH SERVICES | Facility: HOSPITAL | Age: 88
End: 2023-03-10
Payer: MEDICARE

## 2023-04-03 LAB
LEFT EYE DM RETINOPATHY: POSITIVE
RIGHT EYE DM RETINOPATHY: POSITIVE

## 2023-04-27 RX ORDER — INSULIN ASPART 100 [IU]/ML
INJECTION, SOLUTION INTRAVENOUS; SUBCUTANEOUS
Qty: 36 ML | Refills: 3 | Status: SHIPPED | OUTPATIENT
Start: 2023-04-27 | End: 2023-05-25

## 2023-05-01 ENCOUNTER — HOSPITAL ENCOUNTER (OUTPATIENT)
Dept: RADIOLOGY | Facility: HOSPITAL | Age: 88
Discharge: HOME | End: 2023-05-01
Attending: HOSPITALIST
Payer: MEDICARE

## 2023-05-01 DIAGNOSIS — C18.3 MALIGNANT NEOPLASM OF HEPATIC FLEXURE: ICD-10-CM

## 2023-05-01 LAB
CREAT SERPL-MCNC: 1 MG/DL (ref 0.5–1.4)
SAMPLE: NORMAL

## 2023-05-01 PROCEDURE — 74177 CT ABD & PELVIS W/CONTRAST: CPT | Mod: 26,,, | Performed by: RADIOLOGY

## 2023-05-01 PROCEDURE — 74177 CT CHEST ABDOMEN PELVIS WITH CONTRAST (XPD): ICD-10-PCS | Mod: 26,,, | Performed by: RADIOLOGY

## 2023-05-01 PROCEDURE — 71260 CT CHEST ABDOMEN PELVIS WITH CONTRAST (XPD): ICD-10-PCS | Mod: 26,,, | Performed by: RADIOLOGY

## 2023-05-01 PROCEDURE — 25500020 PHARM REV CODE 255: Performed by: HOSPITALIST

## 2023-05-01 PROCEDURE — 74177 CT ABD & PELVIS W/CONTRAST: CPT | Mod: TC

## 2023-05-01 PROCEDURE — 71260 CT THORAX DX C+: CPT | Mod: TC

## 2023-05-01 PROCEDURE — A9698 NON-RAD CONTRAST MATERIALNOC: HCPCS | Performed by: HOSPITALIST

## 2023-05-01 PROCEDURE — 71260 CT THORAX DX C+: CPT | Mod: 26,,, | Performed by: RADIOLOGY

## 2023-05-01 RX ADMIN — IOHEXOL 75 ML: 350 INJECTION, SOLUTION INTRAVENOUS at 10:05

## 2023-05-01 RX ADMIN — Medication 450 ML: at 10:05

## 2023-05-03 DIAGNOSIS — Z71.89 COMPLEX CARE COORDINATION: ICD-10-CM

## 2023-05-04 ENCOUNTER — LAB VISIT (OUTPATIENT)
Dept: LAB | Facility: HOSPITAL | Age: 88
End: 2023-05-04
Attending: HOSPITALIST
Payer: MEDICARE

## 2023-05-04 ENCOUNTER — OFFICE VISIT (OUTPATIENT)
Dept: HEMATOLOGY/ONCOLOGY | Facility: CLINIC | Age: 88
End: 2023-05-04
Payer: MEDICARE

## 2023-05-04 VITALS
WEIGHT: 97.88 LBS | BODY MASS INDEX: 15.73 KG/M2 | SYSTOLIC BLOOD PRESSURE: 144 MMHG | TEMPERATURE: 98 F | DIASTOLIC BLOOD PRESSURE: 72 MMHG | HEIGHT: 66 IN | HEART RATE: 70 BPM

## 2023-05-04 DIAGNOSIS — R80.9 TYPE 2 DIABETES MELLITUS WITH MICROALBUMINURIA, WITHOUT LONG-TERM CURRENT USE OF INSULIN: ICD-10-CM

## 2023-05-04 DIAGNOSIS — K86.89 PANCREATIC INSUFFICIENCY: ICD-10-CM

## 2023-05-04 DIAGNOSIS — C7A.090 CARCINOID BRONCHIAL ADENOMA, RIGHT: ICD-10-CM

## 2023-05-04 DIAGNOSIS — E11.29 TYPE 2 DIABETES MELLITUS WITH MICROALBUMINURIA, WITHOUT LONG-TERM CURRENT USE OF INSULIN: ICD-10-CM

## 2023-05-04 DIAGNOSIS — J45.40 MODERATE PERSISTENT ASTHMA WITHOUT COMPLICATION: ICD-10-CM

## 2023-05-04 DIAGNOSIS — I10 PRIMARY HYPERTENSION: ICD-10-CM

## 2023-05-04 DIAGNOSIS — C18.2 MALIGNANT NEOPLASM OF ASCENDING COLON: ICD-10-CM

## 2023-05-04 DIAGNOSIS — C18.9 MALIGNANT NEOPLASM OF COLON, UNSPECIFIED PART OF COLON: Primary | ICD-10-CM

## 2023-05-04 PROBLEM — R60.0 LOWER EXTREMITY EDEMA: Status: RESOLVED | Noted: 2018-02-21 | Resolved: 2023-05-04

## 2023-05-04 PROBLEM — R63.4 WEIGHT LOSS: Status: RESOLVED | Noted: 2022-11-30 | Resolved: 2023-05-04

## 2023-05-04 LAB
ALBUMIN SERPL BCP-MCNC: 3.4 G/DL (ref 3.5–5.2)
ALP SERPL-CCNC: 54 U/L (ref 55–135)
ALT SERPL W/O P-5'-P-CCNC: 21 U/L (ref 10–44)
ANION GAP SERPL CALC-SCNC: 9 MMOL/L (ref 8–16)
AST SERPL-CCNC: 22 U/L (ref 10–40)
BILIRUB SERPL-MCNC: 0.5 MG/DL (ref 0.1–1)
BUN SERPL-MCNC: 35 MG/DL (ref 8–23)
CALCIUM SERPL-MCNC: 10.1 MG/DL (ref 8.7–10.5)
CEA SERPL-MCNC: 3.3 NG/ML (ref 0–5)
CHLORIDE SERPL-SCNC: 106 MMOL/L (ref 95–110)
CO2 SERPL-SCNC: 25 MMOL/L (ref 23–29)
CREAT SERPL-MCNC: 1.1 MG/DL (ref 0.5–1.4)
ERYTHROCYTE [DISTWIDTH] IN BLOOD BY AUTOMATED COUNT: 14.1 % (ref 11.5–14.5)
EST. GFR  (NO RACE VARIABLE): 48.9 ML/MIN/1.73 M^2
GLUCOSE SERPL-MCNC: 106 MG/DL (ref 70–110)
HCT VFR BLD AUTO: 36.1 % (ref 37–48.5)
HGB BLD-MCNC: 11.5 G/DL (ref 12–16)
IMM GRANULOCYTES # BLD AUTO: 0.02 K/UL (ref 0–0.04)
MCH RBC QN AUTO: 30.3 PG (ref 27–31)
MCHC RBC AUTO-ENTMCNC: 31.9 G/DL (ref 32–36)
MCV RBC AUTO: 95 FL (ref 82–98)
NEUTROPHILS # BLD AUTO: 4.7 K/UL (ref 1.8–7.7)
PLATELET # BLD AUTO: 246 K/UL (ref 150–450)
PMV BLD AUTO: 11.1 FL (ref 9.2–12.9)
POTASSIUM SERPL-SCNC: 3.8 MMOL/L (ref 3.5–5.1)
PROT SERPL-MCNC: 6.6 G/DL (ref 6–8.4)
RBC # BLD AUTO: 3.79 M/UL (ref 4–5.4)
SODIUM SERPL-SCNC: 140 MMOL/L (ref 136–145)
WBC # BLD AUTO: 8.88 K/UL (ref 3.9–12.7)

## 2023-05-04 PROCEDURE — 36415 COLL VENOUS BLD VENIPUNCTURE: CPT | Performed by: HOSPITALIST

## 2023-05-04 PROCEDURE — 82378 CARCINOEMBRYONIC ANTIGEN: CPT | Performed by: HOSPITALIST

## 2023-05-04 PROCEDURE — 99214 OFFICE O/P EST MOD 30 MIN: CPT | Mod: PBBFAC | Performed by: HOSPITALIST

## 2023-05-04 PROCEDURE — 99214 OFFICE O/P EST MOD 30 MIN: CPT | Mod: S$PBB,,, | Performed by: HOSPITALIST

## 2023-05-04 PROCEDURE — 99214 PR OFFICE/OUTPT VISIT, EST, LEVL IV, 30-39 MIN: ICD-10-PCS | Mod: S$PBB,,, | Performed by: HOSPITALIST

## 2023-05-04 PROCEDURE — 80053 COMPREHEN METABOLIC PANEL: CPT | Performed by: HOSPITALIST

## 2023-05-04 PROCEDURE — 99999 PR PBB SHADOW E&M-EST. PATIENT-LVL IV: CPT | Mod: PBBFAC,,, | Performed by: HOSPITALIST

## 2023-05-04 PROCEDURE — 85027 COMPLETE CBC AUTOMATED: CPT | Performed by: HOSPITALIST

## 2023-05-04 PROCEDURE — 99999 PR PBB SHADOW E&M-EST. PATIENT-LVL IV: ICD-10-PCS | Mod: PBBFAC,,, | Performed by: HOSPITALIST

## 2023-05-04 NOTE — ASSESSMENT & PLAN NOTE
Recommended surveillance for stage II and stage III colon cancer per [facility] includes:    - Exam every 3-6 months x 2 years then every 6 month to complete 5 years of surveillance  - CEA every 3-6 months x2 years then every 6 months to complete 5 years of surveillance  - CT CAP every 6-12 moths for 5 years  - Colonoscopy 1 year from surgery if pre-operative complete colonoscopy; otherwise colonoscopy 3-6 months after surgery if no complete colonoscopy prior to surgery  - Repeat colonoscopy timing depends on presence of advanced adenoma (1 year if present; 3 years if not; then every 5 years).    - Due for colonoscopy  - Follow up in 6 months with repeat labs and CT torso

## 2023-05-04 NOTE — PATIENT INSTRUCTIONS
Recent CT scan shows no evidence of colon cancer recurrence. Additionally montioring the small and indolent 'carcinoid' tumor in the lungs. Will plan on repeat imaging in 6 months.     Please follow up with GI doctors about upcoming colonsocopy.    Will place referral to pulmonology to re-establish care given history of recurrent pulmonary infections, asthma, and ongoing cough and congestion.    Follow up in 6 months

## 2023-05-04 NOTE — PROGRESS NOTES
MEDICAL ONCOLOGY FOLLOW-UP VISIT.     Best Contact Phone Number(s): [phone] (home)      Cancer/Stage/TNM:    Cancer Staging   Colon cancer  Staging form: Colon and Rectum, AJCC 8th Edition  - Clinical: Stage IIB (cT4a, cN0, cM0) - Signed by Nakul English MD on 2/3/2023       Reason for visit: Sussy Costa is a 87 y.o. female with asthma, recurrent PNA and hypoxic respiratory failure, and DM2 who was hospitalized 10/2022 for large bowel obstruction. 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  She deferred adjuvant chemotherapy for her high risk stage II CRC.  She presents to medical oncology clinic for routine follow up.      Interval History:     Patient feels generally well. Does note ongoing chest congestion with producive mucinous cough. Not signifcantly changed over several months and no fevers or chills. No orthopnea. Otherwise no N/V. Appetite is stable around 97 lbs. She has difficulty gaining weight due to blood sugar elevation with nutritional supplements (including Glucerna). No N/V. Reports generally normal BM without blood. Sometimes has small caliber. Has appt upcoming to discuss  colonoscopy. No longer has any lower extremity edema; last took lasix a few months ago.    Oncology History   Colon cancer   10/25/2022 Surgery    Right hemicolectomy    Moderately differentiated MMR proficient adenocarcinoma invading into the visceral peritoneum with associated PNI, LVI, and high tumor budding. Negative margins. 0/18 LN. [id number]     10/25/2022 Imaging Significant Findings    CT a/p with contrast:     1. Circumferential wall thickening of the short segment of ascending colon and apple-core appearance with pericolic fat stranding and free fluid, resulting in significant mass effect and dilatation of the cecum up to  measuring 10 cm.  Suspect obstructing ascending colon neoplasm.    2. Diffuse gastric wall thickening and enhancement suggesting gastritis.  3. Stable innumerable pancreatic hypodense lesion.  4. Pulmonary nodules similar in size and number in comparison prior.  Metastasis not excluded.  5. Multiple hypodense lesions in the liver and a few in the spleen as well, similar to prior.  Metastasis not excluded.       12/7/2022 Imaging Significant Findings    CT chest:    1. Following resolution of left inter lobar and lower lobe bronchi mucous plugging, there is resolution of previous left lower lobe atelectasis.  However, there are many bilateral subcentimeter pulmonary nodules in both lower lobes which appear increased since a CT of the abdomen and pelvis 10/25/2022 in which there is partial imaging of the chest base.  There is a new 1.4 cm right lower lobe nodule.  2. Interval development of small volume right-sided pleural effusion.  This report was flagged in Epic as abnormal.     12/19/2022 Imaging Significant Findings    MR abdomen without evidence of intrabdominal malignancy. Ongoing chronic pancreatitis     1/24/2023 Biopsy    Biopsy of right lower lube nodule : Typical carcinoid. No mitoses noted.     2/15/2023 Notable Event      Seen by rad onc (Dr. Zamorano) 02/15/23 regarding the carcinoid tumor in the lung- imaging felt consistent with an indolent carcinoid tumor over the last decade with more recent inflammatory nodule. Plan for ongoing surveillance.     5/1/2023 Imaging Significant Findings    CT torso: Improvement in the 1.4cm RLL nodule. This nodule was not biopsied, and was though possible more inflammatory in nature. Other subcentimeter nodules remain stable. No evidence of disease progression. No other evidence of recurrent or persistent colon cancer.            Physical Exam:   BP (!) 144/72 (BP Location: Left arm, Patient Position: Sitting, BP Method: Medium (Automatic))   Pulse 70   Temp 97.8 °F  "(36.6 °C) (Oral)   Ht 5' 6" (1.676 m)   Wt 44.4 kg (97 lb 14.2 oz)   BMI 15.80 kg/m²      ECOG Performance Status: (foot note - ECOG PS provided by Eastern Cooperative Oncology Group) 1 - Symptomatic but completely ambulatory    Physical Exam  Constitutional:       General: She is not in acute distress.     Appearance: She is normal weight.   HENT:      Head: Normocephalic.      Mouth/Throat:      Mouth: Mucous membranes are moist.      Pharynx: Oropharynx is clear.   Eyes:      General: No scleral icterus.     Conjunctiva/sclera: Conjunctivae normal.      Pupils: Pupils are equal, round, and reactive to light.   Cardiovascular:      Rate and Rhythm: Normal rate and regular rhythm.      Heart sounds: No murmur heard.  Pulmonary:      Effort: Pulmonary effort is normal. No respiratory distress.      Breath sounds: Normal breath sounds.   Abdominal:      General: There is no distension.      Palpations: Abdomen is soft.   Musculoskeletal:         General: Normal range of motion.      Cervical back: Normal range of motion and neck supple.      Right lower leg: No edema.      Left lower leg: No edema.   Lymphadenopathy:      Cervical: No cervical adenopathy.   Skin:     General: Skin is warm.      Coloration: Skin is not jaundiced.   Neurological:      General: No focal deficit present.      Mental Status: She is alert and oriented to person, place, and time.      Motor: No weakness.   Psychiatric:         Mood and Affect: Mood normal.         Behavior: Behavior normal.         Thought Content: Thought content normal.         Labs:   Lab Results   Component Value Date     05/04/2023    K 3.8 05/04/2023    CREATININE 1.1 05/04/2023    WBC 8.88 05/04/2023    HGB 11.5 (L) 05/04/2023     05/04/2023    AST 22 05/04/2023    ALT 21 05/04/2023    ALKPHOS 54 (L) 05/04/2023    BILITOT 0.5 05/04/2023          Imaging:     CT Chest Abdomen Pelvis With Contrast  Narrative: EXAMINATION:  CT CHEST ABDOMEN PELVIS WITH " CONTRAST (XPD)    CLINICAL HISTORY:  Colon cancer, non-metastatic, staging; Malignant neoplasm of hepatic flexure    TECHNIQUE:  Low dose axial images, sagittal and coronal reformations were obtained from the thoracic inlet to the pubic symphysis following the IV administration of 75 mL of Omnipaque 350.  450 mL of oral barium sulfate was administered.    COMPARISON:  MRI abdomen 12/19/2020, CT chest 12/08/2022, CT abdomen pelvis 10/25/2022    FINDINGS:  Base of Neck: No significant abnormality.    Thoracic soft tissues: Bilateral breast calcifications, similar prior.  No axillary lymphadenopathy.    Aorta: 3 vessel left-sided aortic arch with advanced calcific atherosclerosis including at the origin of the left common carotid and left subclavian arteries.  No aneurysm.  Aortic annular calcifications are noted.    Heart: Mitral calcifications.  Moderate bilateral calcification of coronary arteries.  Normal size.  No pericardial effusion.    Pulmonary vasculature: Pulmonary arteries distribute normally.  There are four pulmonary veins.    Geovanna/Mediastinum: No pathologic ford enlargement.    Airways: Scattered areas of bronchiolar opacification likely reflecting mucoid impaction.    Lungs/Pleura: Improvement of the previously seen 1.4 cm nodule in the right lower lobe.  Additional innumerable bilateral ground-glass and solid pulmonary nodules which are grossly similar when compared to prior.  For example the 0.8 cm nodule in the lateral segment right lower lobe (series 6, image 376.  Or for example the 0.6 cm pulmonary nodule in the posterior segment right lower lobe (series 6, image 374).  No new large pulmonary nodules.  Scattered bands of subsegmental atelectasis, improved from prior.    Esophagus: Mildly patulous esophagus.  Small hiatal hernia.    Liver: Normal size.  Several subcentimeter hepatic hypodensities which are too small to characterize and are stable from prior.    Gallbladder: Surgically  absent.    Bile Ducts: Mild intrahepatic biliary ductal dilatation, improved from the prior CT 10/25/2022.    Pancreas: Atrophic pancreatic parenchyma with scattered calcifications.  Dilatation of the pancreatic duct measuring up to 1.0 cm.  Additional cystic lesions suspected within the pancreatic parenchyma for example the 2.1 cm cyst the level the pancreatic head (series 3, image 45).  These are similar compared to prior.    Spleen: Few subcentimeter hypodensities, undetermined significance and prior.    Adrenals: Normal.    Kidneys/Ureters: Normal enhancement.  Normal size and location.  Few subcentimeter cortical hypodensities bilaterally which are too small to adequately characterize.  2 mm calcification inferior pole of the left kidney likely a nonobstructive nephrolith.  No mass or  hydroureteronephrosis.    Bladder: Distended without evidence of wall thickening.    Reproductive organs: Surgically absent.    GI Tract/Mesentery: Postoperative changes of right hemicolectomy with gaseous distension of the remainder of the transverse/left colon and rectosigmoid colon.  No evidence of obstruction.  No evidence of colonic masses.    Peritoneal Space: No ascites or free air.    Retroperitoneum: No significant adenopathy.    Abdominal wall: Minimal body wall edema.  Surgical changes in the inferior right ventral abdominal wall.    Vasculature: Dense aortoiliac calcific atherosclerosis.  No aneurysm.    Bones: Osteopenic bones.  No acute fracture.  No osseous destructive lesions.  Impression: In this patient with colon cancer there are postoperative changes of right hemicolectomy without evidence of local recurrent disease.    Improvement of the previously seen 1.4 cm nodule in the right lower lobe.  Additional stable innumerable pulmonary nodules measuring up to 0.8 cm without evidence of new nodules.    Subcentimeter hepatic hypodensities too small to characterize and similar to prior.    Atrophic pancreas with  dilatation the pancreatic duct and the cystic pancreatic lesions, unchanged from prior and better evaluated on prior MRI.    Additional findings as above.    Electronically signed by resident: Lucie Alfonso  Date:    05/01/2023  Time:    10:37    Electronically signed by: Alfonso Bruce MD  Date:    05/01/2023  Time:    11:30            Diagnoses:       1. Malignant neoplasm of colon, unspecified part of colon    2. Type 2 diabetes mellitus with microalbuminuria, without long-term current use of insulin    3. Pancreatic insufficiency    4. Carcinoid bronchial adenoma, right    5. Moderate persistent asthma without complication    6. Primary hypertension                Assessment and Plan:     1. Malignant neoplasm of colon, unspecified part of colon  Overview:  Right sided colon cancer diagnosed in setting of LBO requiring urgent hemicolectomy 10/25/2022 with path showing pT4aN0 disease. CT chest 12/7/22 with possible lung metastases and MR liver with indeterminate liver lesions. 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 Patient elected for surveillance.      Assessment & Plan:  Recommended surveillance for stage II and stage III colon cancer per NCCN includes:    - Exam every 3-6 months x 2 years then every 6 month to complete 5 years of surveillance  - CEA every 3-6 months x2 years then every 6 months to complete 5 years of surveillance  - CT CAP every 6-12 moths for 5 years  - Colonoscopy 1 year from surgery if pre-operative complete colonoscopy; otherwise colonoscopy 3-6 months after surgery if no complete colonoscopy prior to surgery  - Repeat colonoscopy timing depends on presence of advanced adenoma (1 year if present; 3 years if not; then every 5 years).    - Due for colonoscopy  - Follow up in 6 months with repeat labs and CT torso      2. Type 2 diabetes mellitus with microalbuminuria, without long-term current use of  insulin  Overview:  Home regimen includes levemir and Novolog. Last A1c 6.9 in 11/2022      3. Pancreatic insufficiency  Overview:  Diagnosed in setting of weight loss and bowel incontinence 2022. Has chronic pancreatitis on imaging. Home medications include Creon      4. Carcinoid bronchial adenoma, right  Overview:  Incidentally noted on imaging Undergoing surveillance 12/2022 and biopsy proven 01/2023. On imaging review appears indolent. Plan for surveillance    Assessment & Plan:  - Repeat CT imaging in 6 months    Orders:  -     Ambulatory referral/consult to Pulmonology; Future; Expected date: 05/11/2023    5. Moderate persistent asthma without complication  Overview:  Followed by Dr. Peguero. Home medications include Advair and montelukast and albuterol.    Assessment & Plan:  - Needs to re-establish care in pulmonlogy; referral placed    Orders:  -     Ambulatory referral/consult to Pulmonology; Future; Expected date: 05/11/2023    6. Primary hypertension  Overview:  Home medications include chlorthalidone and irbesartan with every other day lasix                   Route Chart for Scheduling    Med Onc Chart Routing      Follow up with physician 6 months.   Follow up with DOMONIQUE    Infusion scheduling note    Injection scheduling note    Labs CBC, CMP and CEA   Scheduling:  Preferred lab:  Lab interval:     Imaging CT chest abdomen pelvis      Pharmacy appointment    Other referrals             Therapy Plan Information  Medications  denosumab (PROLIA) injection 60 mg  60 mg, Subcutaneous, Every 26 weeks       Nakul English MD  Hematology/Oncology  [facility]

## 2023-05-10 ENCOUNTER — OFFICE VISIT (OUTPATIENT)
Dept: PODIATRY | Facility: CLINIC | Age: 88
End: 2023-05-10
Payer: MEDICARE

## 2023-05-10 VITALS
HEIGHT: 66 IN | BODY MASS INDEX: 16.3 KG/M2 | HEART RATE: 72 BPM | WEIGHT: 101.44 LBS | DIASTOLIC BLOOD PRESSURE: 80 MMHG | SYSTOLIC BLOOD PRESSURE: 150 MMHG

## 2023-05-10 DIAGNOSIS — E11.49 TYPE II DIABETES MELLITUS WITH NEUROLOGICAL MANIFESTATIONS: Primary | ICD-10-CM

## 2023-05-10 DIAGNOSIS — B35.1 ONYCHOMYCOSIS DUE TO DERMATOPHYTE: ICD-10-CM

## 2023-05-10 DIAGNOSIS — L84 CORN OR CALLUS: ICD-10-CM

## 2023-05-10 DIAGNOSIS — G57.53 TARSAL TUNNEL SYNDROME OF BOTH LOWER EXTREMITIES: ICD-10-CM

## 2023-05-10 PROCEDURE — 99999 PR PBB SHADOW E&M-EST. PATIENT-LVL IV: CPT | Mod: PBBFAC,,, | Performed by: PODIATRIST

## 2023-05-10 PROCEDURE — 99499 NO LOS: ICD-10-PCS | Mod: S$PBB,,, | Performed by: PODIATRIST

## 2023-05-10 PROCEDURE — 11721 DEBRIDE NAIL 6 OR MORE: CPT | Mod: Q9,59,S$PBB, | Performed by: PODIATRIST

## 2023-05-10 PROCEDURE — 64450 NJX AA&/STRD OTHER PN/BRANCH: CPT | Mod: 50,S$PBB,, | Performed by: PODIATRIST

## 2023-05-10 PROCEDURE — 99999 PR PBB SHADOW E&M-EST. PATIENT-LVL IV: ICD-10-PCS | Mod: PBBFAC,,, | Performed by: PODIATRIST

## 2023-05-10 PROCEDURE — 64450 NJX AA&/STRD OTHER PN/BRANCH: CPT | Mod: 59,50,PBBFAC | Performed by: PODIATRIST

## 2023-05-10 PROCEDURE — 11056 PARNG/CUTG B9 HYPRKR LES 2-4: CPT | Mod: 59,Q9,S$PBB, | Performed by: PODIATRIST

## 2023-05-10 PROCEDURE — 11056 PARNG/CUTG B9 HYPRKR LES 2-4: CPT | Mod: PBBFAC,Q9 | Performed by: PODIATRIST

## 2023-05-10 PROCEDURE — 99214 OFFICE O/P EST MOD 30 MIN: CPT | Mod: PBBFAC,25 | Performed by: PODIATRIST

## 2023-05-10 PROCEDURE — 11056 PR TRIM BENIGN HYPERKERATOTIC SKIN LESION,2-4: ICD-10-PCS | Mod: 59,Q9,S$PBB, | Performed by: PODIATRIST

## 2023-05-10 PROCEDURE — 11721 DEBRIDE NAIL 6 OR MORE: CPT | Mod: PBBFAC | Performed by: PODIATRIST

## 2023-05-10 PROCEDURE — 99499 UNLISTED E&M SERVICE: CPT | Mod: S$PBB,,, | Performed by: PODIATRIST

## 2023-05-10 PROCEDURE — 64450 PR NERVE BLOCK INJ, ANES/STEROID, OTHER PERIPHERAL: ICD-10-PCS | Mod: 50,S$PBB,, | Performed by: PODIATRIST

## 2023-05-10 PROCEDURE — 11721 PR DEBRIDEMENT OF NAILS, 6 OR MORE: ICD-10-PCS | Mod: Q9,59,S$PBB, | Performed by: PODIATRIST

## 2023-05-10 RX ORDER — LIDOCAINE 30 MG/G
1 CREAM TOPICAL 2 TIMES DAILY
Qty: 85 G | Refills: 3 | Status: SHIPPED | OUTPATIENT
Start: 2023-05-10 | End: 2023-11-10 | Stop reason: ALTCHOICE

## 2023-05-11 ENCOUNTER — TELEPHONE (OUTPATIENT)
Dept: OTOLARYNGOLOGY | Facility: CLINIC | Age: 88
End: 2023-05-11
Payer: MEDICARE

## 2023-05-11 NOTE — TELEPHONE ENCOUNTER
Spoke with [employer]. Per Dr. Colin, I provided a verbal order for a refill on the budesonide. Maritza thanked me and verbalized understanding.     ----- Message from Naina Paul sent at 5/10/2023  6:00 PM CDT -----  Contact: pt  Type:  RX Refill Request    Who Called: pt   Refill or New Rx:refill  RX Name and Strength:Budesonide 1mg/2ML ( not on list)  Preferred Pharmacy with phone number:[email or web address] [address] [place], [place] [address]  Phone #: [phone]  Local or Mail Order:local  Ordering Provider:Cristi  Would the patient rather a call back or a response via [facility]? call  Best Call Back Number:[phone]  Additional Information:

## 2023-05-12 ENCOUNTER — OFFICE VISIT (OUTPATIENT)
Dept: URGENT CARE | Facility: CLINIC | Age: 88
End: 2023-05-12
Payer: MEDICARE

## 2023-05-12 VITALS
HEART RATE: 68 BPM | DIASTOLIC BLOOD PRESSURE: 78 MMHG | BODY MASS INDEX: 16.23 KG/M2 | HEIGHT: 66 IN | RESPIRATION RATE: 18 BRPM | WEIGHT: 101 LBS | SYSTOLIC BLOOD PRESSURE: 153 MMHG | TEMPERATURE: 97 F | OXYGEN SATURATION: 97 %

## 2023-05-12 DIAGNOSIS — L25.9 CONTACT DERMATITIS, UNSPECIFIED CONTACT DERMATITIS TYPE, UNSPECIFIED TRIGGER: Primary | ICD-10-CM

## 2023-05-12 PROCEDURE — 99213 OFFICE O/P EST LOW 20 MIN: CPT | Mod: S$GLB,,, | Performed by: FAMILY MEDICINE

## 2023-05-12 PROCEDURE — 99213 PR OFFICE/OUTPT VISIT, EST, LEVL III, 20-29 MIN: ICD-10-PCS | Mod: S$GLB,,, | Performed by: FAMILY MEDICINE

## 2023-05-12 RX ORDER — HYDROCORTISONE 25 MG/G
CREAM TOPICAL 2 TIMES DAILY
Qty: 20 G | Refills: 1 | Status: SHIPPED | OUTPATIENT
Start: 2023-05-12 | End: 2023-11-22 | Stop reason: ALTCHOICE

## 2023-05-12 NOTE — PROGRESS NOTES
"Subjective:      Patient ID: Sussy Costa is a 87 y.o. female.    Vitals:  height is 5' 6" (1.676 m) and weight is 45.8 kg (101 lb). Her oral temperature is 97.4 °F (36.3 °C). Her blood pressure is 153/78 (abnormal) and her pulse is 68. Her respiration is 18 and oxygen saturation is 97%.     Chief Complaint: Rash    This is a 87 y.o. female who presents today with a chief complaint of  rash On L side of face     Rash  This is a new problem. The problem has been gradually worsening since onset. The affected locations include the face. The rash is characterized by itchiness and redness. Pertinent negatives include no congestion, cough, eye pain, shortness of breath or vomiting.     HENT:  Negative for congestion.    Eyes:  Negative for eye pain.   Respiratory:  Negative for cough and shortness of breath.    Gastrointestinal:  Negative for vomiting.   Skin:  Positive for rash and erythema.    Objective:     Physical Exam   Constitutional: She does not appear ill. No distress. normal  Cardiovascular: Normal rate, regular rhythm, normal heart sounds and normal pulses.   Pulmonary/Chest: Effort normal and breath sounds normal.   Abdominal: Normal appearance.   Neurological: She is alert.   Skin: Skin is rash. erythema and lesion (scattered red papular lesions on cheeks bilaterally)   Nursing note and vitals reviewed.    Assessment:     1. Contact dermatitis, unspecified contact dermatitis type, unspecified trigger        Plan:       Contact dermatitis, unspecified contact dermatitis type, unspecified trigger  -     hydrocortisone 2.5 % cream; Apply topically 2 (two) times daily. For one week only  Dispense: 20 g; Refill: 1    Trial of benadryl then HC 2.5% as above if persists or worsens                "

## 2023-05-17 ENCOUNTER — TELEPHONE (OUTPATIENT)
Dept: PULMONOLOGY | Facility: CLINIC | Age: 88
End: 2023-05-17
Payer: MEDICARE

## 2023-05-17 NOTE — TELEPHONE ENCOUNTER
LVM for patient to return my call. I was calling in reference to referral placed on 5/4/23 by Dr English. 5/17/23-BG

## 2023-05-18 NOTE — PROGRESS NOTES
Subjective:      Patient ID: Sussy Costa is a 87 y.o. female.    Chief Complaint: Nail Care and Diabetes Mellitus (PCP-PAULA Casillas MD-3/1/2023)    Pt presents today c/o pain in her left heel/ankle. Pt states that it is painful when she walks and when she wears shoes. Pt denies any trauma.  In addition, she is here for routine diabetic foot evaluation as well as a new issue of itching to the bottoms of both feet with some skin peeling.    Review of Systems   Constitutional: Negative for chills, decreased appetite, fever, malaise/fatigue and night sweats.   HENT:  Negative for congestion, ear discharge, hearing loss, nosebleeds and tinnitus.    Eyes:  Negative for double vision, pain and visual disturbance.   Cardiovascular:  Negative for chest pain, claudication, cyanosis and palpitations.   Respiratory:  Negative for cough, hemoptysis, shortness of breath and wheezing.    Endocrine: Negative for cold intolerance and heat intolerance.   Hematologic/Lymphatic: Negative for adenopathy and bleeding problem.   Skin:  Positive for color change, dry skin, nail changes and unusual hair distribution. Negative for flushing and rash.   Musculoskeletal:  Positive for arthritis and stiffness. Negative for joint pain and myalgias.   Gastrointestinal:  Negative for abdominal pain, dysphagia, nausea and vomiting.   Genitourinary:  Negative for dysuria, flank pain, hematuria and pelvic pain.   Neurological:  Positive for disturbances in coordination, paresthesias and sensory change. Negative for loss of balance and numbness.   Psychiatric/Behavioral:  Negative for altered mental status, hallucinations and suicidal ideas. The patient does not have insomnia.    Allergic/Immunologic: Negative for environmental allergies and persistent infections.         Objective:      Physical Exam  Vitals reviewed.   Constitutional:       Appearance: She is well-developed.   HENT:      Head: Normocephalic and atraumatic.   Cardiovascular:       Pulses:           Dorsalis pedis pulses are 2+ on the right side and 2+ on the left side.        Posterior tibial pulses are 2+ on the right side and 2+ on the left side.      Comments: No edema noted to b/L LEs  Pulmonary:      Effort: Pulmonary effort is normal.   Musculoskeletal:         General: Normal range of motion.      Comments: Muscle strength is 5/5 in all groups bilaterally.  Non reducible equinus noted to left lower extremity  POP to insertion of the AT, left    Positive Tinel sign/provocation sign bilateral tibial nerve.   Feet:      Right foot:      Protective Sensation: 5 sites tested.  5 sites sensed.      Skin integrity: Callus and dry skin present.      Left foot:      Protective Sensation: 5 sites tested.  5 sites sensed.      Skin integrity: Callus and dry skin present.   Skin:     General: Skin is warm and dry.      Capillary Refill: Capillary refill takes 2 to 3 seconds.      Coloration: Skin is pale.      Comments: Long, thickened, discolored  toenails 1-5 with subungual debris.  Skin dry thin and atrophic.   Neurological:      Mental Status: She is alert and oriented to person, place, and time.      Sensory: Sensory deficit present.      Motor: Atrophy present.      Deep Tendon Reflexes: Reflexes abnormal.      Reflex Scores:       Patellar reflexes are 1+ on the right side and 1+ on the left side.       Achilles reflexes are 1+ on the right side and 1+ on the left side.     Comments: Intact gross sensation noted to b/L LEs   Psychiatric:         Behavior: Behavior normal.             Assessment:       No diagnosis found.        Plan:       There are no diagnoses linked to this encounter.    I counseled the patient on her conditions, their implications and medical management.    Decision making:  Chronic illnesses discussed in detail, previous records/notes and imaging independently reviewed, prescription drug management performed in addition to lengthy discussion regarding both  conservative and surgical treatment options.    Discussed options for peripheral neuropathy/nerve entrapment syndrome including nerve block therapy, surgical nerve entrapment decompression procedures, and various vitamins and supplementation available shown to improve nerve function.    Routine Foot Care    Performed by:  Alen Campa. DPM  Authorized by:  Patient     Consent Done?:  Yes (Verbal)     Nail Care Type:  Debride  Location(s): All  (Left 1st Toe, Left 3rd Toe, Left 2nd Toe, Left 4th Toe, Left 5th Toe, Right 1st Toe, Right 2nd Toe, Right 3rd Toe, Right 4th Toe and Right 5th Toe)  Patient tolerance:  Patient tolerated the procedure well with no immediate complications     With patient's permission, the toenails mentioned above were aggressively reduced and debrided using a nail nipper, removing all offending nail and debris. The patient will continue to monitor the areas daily, inspect the feet, wear protective shoe gear when ambulatory, and moisturizer to maintain skin integrity.      Callus Care Type: Debride    With patient's permission, the calluses/hyperkeratotic lesions mentioned above were aggressively reduced and debrided using a number 15 blade. The patient will continue to monitor the areas daily, inspect the feet, wear protective shoe gear when ambulatory, and moisturizer to maintain skin integrity.       Nerve injection:    The area overlying the bilateral tarsal tunnel nerve(s) was sterilely prepped, verbal consent was obtained, 2 cc's of 0.5% Marcaine plain was infiltrated around the affected nerve(s) for a diagnostic nerve block.  This was well tolerated with no complications.        Shoe inspection. Diabetic Foot Education. Patient reminded of the importance of good nutrition and blood sugar control to help prevent podiatric complications of diabetes. Patient instructed on proper foot hygeine. We discussed wearing proper shoe gear, daily foot inspections and Diabetic foot education in  detail.    Return to clinic in 3-6 months or sooner if problems arise         .

## 2023-05-19 ENCOUNTER — TELEPHONE (OUTPATIENT)
Dept: PULMONOLOGY | Facility: CLINIC | Age: 88
End: 2023-05-19
Payer: MEDICARE

## 2023-05-19 NOTE — TELEPHONE ENCOUNTER
2nd attempt: LVM for patient to return my call. I was calling to schedule referral placed on 5/4/23 by Dr English. Patient to call back to discuss. 5/19/23-BG

## 2023-05-19 NOTE — TELEPHONE ENCOUNTER
----- Message from Liseth Key sent at 5/19/2023 10:14 AM CDT -----  Regarding: appt  Contact: PT @ [phone]  Pt called in to schedule an appt; no available appts in Epic. Pt is asking for a call back soon to schedule. Thanks.    Reason appt not schedule: none available in Epic    Patient's DX: Carcinoid bronchial adenoma, right [C7A.090]  Moderate persistent asthma without complication [J45.40]    Patient requesting call back or [facility] msg: please contact pt by phone. Thanks.    Pt have referral but is established with Dr. Peguero.

## 2023-05-22 ENCOUNTER — LAB VISIT (OUTPATIENT)
Dept: LAB | Facility: HOSPITAL | Age: 88
End: 2023-05-22
Payer: MEDICARE

## 2023-05-22 ENCOUNTER — TELEPHONE (OUTPATIENT)
Dept: PULMONOLOGY | Facility: CLINIC | Age: 88
End: 2023-05-22
Payer: MEDICARE

## 2023-05-22 DIAGNOSIS — R80.9 TYPE 2 DIABETES MELLITUS WITH MICROALBUMINURIA, WITHOUT LONG-TERM CURRENT USE OF INSULIN: ICD-10-CM

## 2023-05-22 DIAGNOSIS — E11.29 TYPE 2 DIABETES MELLITUS WITH MICROALBUMINURIA, WITHOUT LONG-TERM CURRENT USE OF INSULIN: ICD-10-CM

## 2023-05-22 DIAGNOSIS — M81.0 SENILE OSTEOPOROSIS: ICD-10-CM

## 2023-05-22 LAB
25(OH)D3+25(OH)D2 SERPL-MCNC: 32 NG/ML (ref 30–96)
ALBUMIN SERPL BCP-MCNC: 3.5 G/DL (ref 3.5–5.2)
ALP SERPL-CCNC: 54 U/L (ref 55–135)
ALT SERPL W/O P-5'-P-CCNC: 23 U/L (ref 10–44)
ANION GAP SERPL CALC-SCNC: 7 MMOL/L (ref 8–16)
AST SERPL-CCNC: 27 U/L (ref 10–40)
BILIRUB SERPL-MCNC: 0.7 MG/DL (ref 0.1–1)
BUN SERPL-MCNC: 18 MG/DL (ref 8–23)
CALCIUM SERPL-MCNC: 10.1 MG/DL (ref 8.7–10.5)
CHLORIDE SERPL-SCNC: 106 MMOL/L (ref 95–110)
CO2 SERPL-SCNC: 26 MMOL/L (ref 23–29)
CREAT SERPL-MCNC: 1 MG/DL (ref 0.5–1.4)
EST. GFR  (NO RACE VARIABLE): 54.9 ML/MIN/1.73 M^2
ESTIMATED AVG GLUCOSE: 166 MG/DL (ref 68–131)
GLUCOSE SERPL-MCNC: 202 MG/DL (ref 70–110)
HBA1C MFR BLD: 7.4 % (ref 4–5.6)
POTASSIUM SERPL-SCNC: 4.9 MMOL/L (ref 3.5–5.1)
PROT SERPL-MCNC: 6.9 G/DL (ref 6–8.4)
SODIUM SERPL-SCNC: 139 MMOL/L (ref 136–145)

## 2023-05-22 PROCEDURE — 36415 COLL VENOUS BLD VENIPUNCTURE: CPT | Performed by: NURSE PRACTITIONER

## 2023-05-22 PROCEDURE — 80053 COMPREHEN METABOLIC PANEL: CPT | Performed by: NURSE PRACTITIONER

## 2023-05-22 PROCEDURE — 82306 VITAMIN D 25 HYDROXY: CPT | Performed by: NURSE PRACTITIONER

## 2023-05-22 PROCEDURE — 83036 HEMOGLOBIN GLYCOSYLATED A1C: CPT | Performed by: NURSE PRACTITIONER

## 2023-05-22 NOTE — TELEPHONE ENCOUNTER
I spoke with patient. Patient was scheduled with Dr Posada on 7/13/23 at 2pm to establish care former patient of Dr Peguero's referral placed by Dr English on 5/4/23. Appointment mailed. Patient confirmed and verbalized understanding.

## 2023-05-23 RX ORDER — ATORVASTATIN CALCIUM 20 MG/1
20 TABLET, FILM COATED ORAL NIGHTLY
Qty: 90 TABLET | Refills: 0 | Status: SHIPPED | OUTPATIENT
Start: 2023-05-23 | End: 2023-08-26 | Stop reason: SDUPTHER

## 2023-05-23 NOTE — PROGRESS NOTES
Subjective:      Patient ID: Sussy Costa is a 87 y.o. female.    Chief Complaint:  No chief complaint on file.    History of Present Illness  Sussy Costa is here for follow up of DM and Osteoporosis.  Previously seen by me 1/2023.      With regards to diabetes:     Latest Reference Range & Units 03/08/22 10:13   Glucose 70 - 110 mg/dL 207 (H)   C-Peptide 0.78 - 5.19 ng/mL 0.77 (L)   (H): Data is abnormally high  (L): Data is abnormally low  DE: 8/2021  Known complications:  DKA Denies   RN Denies  Eye Exam: needs to make an appointment   PN Yes  Podiatry: 5/2023  Nephropathy Denies  CAD Denies  Reports history of pancreatitis - on Creon.     Current regimen:  Levemir 5 units twice daily   Novolog 4 units before meals   Add correction scale if needed.   Blood sugar 180 to 230 add 1 units   Blood sugar 231 to 280 add 2 units   Blood sugar greater than 281 add 3 units    Reports compliance.     Other medications tried:  None.     Glucose Monitor:   4 times a day testing  Log reviewed: log provided and reviewed, scanned in media tab    Hypoglycemia:  Yes - reduced in frequency - reports the timing varies.   Knows how to correct with 15 grams of carbs- juice, coke, or a peppermint.     Diet/Exercise:  Eats 3 meals a day.   Snacks : occasionally before bed  Drinks : water, soft drinks (diet or regular)   Exercise - tries to stay active   Recent illness, injury, steroids: Denies     Diabetes Management Status    Hemoglobin A1C   Date Value Ref Range Status   05/22/2023 7.4 (H) 4.0 - 5.6 % Final     Comment:     ADA Screening Guidelines:  5.7-6.4%  Consistent with prediabetes  >or=6.5%  Consistent with diabetes    High levels of fetal hemoglobin interfere with the HbA1C  assay. Heterozygous hemoglobin variants (HbS, HgC, etc)do  not significantly interfere with this assay.   However, presence of multiple variants may affect accuracy.     11/30/2022 6.9 (H) 4.0 - 5.6 % Final     Comment:     ADA Screening  Guidelines:  5.7-6.4%  Consistent with prediabetes  >or=6.5%  Consistent with diabetes    High levels of fetal hemoglobin interfere with the HbA1C  assay. Heterozygous hemoglobin variants (HbS, HgC, etc)do  not significantly interfere with this assay.   However, presence of multiple variants may affect accuracy.     07/05/2022 8.2 (H) 4.0 - 5.6 % Final     Comment:     ADA Screening Guidelines:  5.7-6.4%  Consistent with prediabetes  >or=6.5%  Consistent with diabetes    High levels of fetal hemoglobin interfere with the HbA1C  assay. Heterozygous hemoglobin variants (HbS, HgC, etc)do  not significantly interfere with this assay.   However, presence of multiple variants may affect accuracy.         Statin: Taking  ACE/ARB: Taking  Screening or Prevention Patient's value Goal Complete/Controlled?   HgA1C Testing and Control   Lab Results   Component Value Date    HGBA1C 7.4 (H) 05/22/2023      Annually/Less than 8% Yes     Lipid profile : 07/05/2022 Annually Yes     LDL control Lab Results   Component Value Date    LDLCALC 58.4 (L) 07/05/2022    Annually/Less than 100 mg/dl  Yes     Nephropathy screening Lab Results   Component Value Date    LABMICR 15.0 07/26/2019     Lab Results   Component Value Date    PROTEINUA Negative 10/24/2022    Annually Yes     Blood pressure BP Readings from Last 1 Encounters:   05/25/23 (!) 148/79    Less than 140/90 No     Dilated retinal exam : 12/17/2021 Annually No     Foot exam   Most Recent Foot Exam Date: Not Found Annually Yes       With regards to osteoporosis:     First diagnosed with osteoporosis in 2007.    BMD:   11/5/2021  39% risk of a major osteoporotic fracture in the next 10 years.     30% risk of hip fracture in the next 10 years.     Impression:     *Osteoporosis on treatment with Boniva  *Fracture risk is very high due to FRAX >30% or 4.5%  *Compared with previous DXA, BMD at the lumbar spine has remained stable, and the BMD at the total hip has declined      RECOMMENDATIONS:  *Daily calcium intake 8739-7307 mg, dietary sources preferred; Vitamin D 8483-2485 IU daily.  *Weight bearing exercise and fall precautions.  *At it is unusual to lose bone while on bisphosphonate therapy investigate for non adherence, malabsorption, other secondary causes of bone loss and considers changing to parenteral therapy  *Repeat BMD in 2 years    Medications:   Actonel 8/2018 - 12/2021  She was given first dose of Prolia in 2/2018 and developed joint pains and lower ext edema so it was decided that she would not receive another dose of Prolia. We decided against Reclast for the same reason and instead restarted her on an oral bisphosphonate which she has tolerated without side effects. After her last visit 12/2021 we decided to stop the oral bisphosphonate and try Prolia again. She received Prolia  in 1/2022 without any side effects.  Last Dose: 2/13/2023    Calcium intake: dietary sources   Vit D intake: OTC Vit D3 1000iu daily     Weight bearing exercise: walking   Falls: Denies  Fractures: Denies   Significant height loss (>2 inches): ~ 1/2  inch     Family history: Denies    Menopause: 56y.o.  No HRT.    Tobacco Use: Denies  Alcohol Use: Denies  Glucocorticoid History: Prednisone over the years for asthma.   Anticoagulant Use: Denies  GERD/PPI Use: Denies  History of Malabsorption: Yes  Antiseizure Medications: Denies  History of Thyroid Disease: Denies  Kidney Stones/ Kidney Disease: Denies  Personal history of kidney stones: Denies  Family history of kidney stones: Denies  Family history of bone disease or fracture: Denies    Denies point tenderness along spine  Denies bilateral hip tenderness  Gait - steady without the use of assistive device     Lab Results   Component Value Date    CALCIUM 10.1 05/22/2023    PHOS 1.8 (L) 11/02/2022     Vit D, 25-Hydroxy   Date Value Ref Range Status   05/22/2023 32 30 - 96 ng/mL Final     Comment:     Vitamin D deficiency.........<10 ng/mL       "                        Vitamin D insufficiency......10-29 ng/mL       Vitamin D sufficiency........> or equal to 30 ng/mL  Vitamin D toxicity............>100 ng/mL         Review of Systems  As above    Objective:   Physical Exam  Vitals reviewed.   Cardiovascular:      Rate and Rhythm: Normal rate.      Comments: No edema present  Pulmonary:      Effort: Pulmonary effort is normal.   Abdominal:      Palpations: Abdomen is soft.       Visit Vitals  BP (!) 148/79   Pulse 68   Ht 5' 5" (1.651 m)   Wt 45.4 kg (99 lb 15.7 oz)   SpO2 98%   BMI 16.64 kg/m²         Body mass index is 16.64 kg/m².    Lab Review:   Lab Results   Component Value Date    HGBA1C 7.4 (H) 05/22/2023    HGBA1C 6.9 (H) 11/30/2022    HGBA1C 8.2 (H) 07/05/2022       Lab Results   Component Value Date    CHOL 122 07/05/2022    HDL 54 07/05/2022    LDLCALC 58.4 (L) 07/05/2022    TRIG 48 07/05/2022    CHOLHDL 44.3 07/05/2022     Lab Results   Component Value Date     05/22/2023    K 4.9 05/22/2023     05/22/2023    CO2 26 05/22/2023     (H) 05/22/2023    BUN 18 05/22/2023    CREATININE 1.0 05/22/2023    CALCIUM 10.1 05/22/2023    PROT 6.9 05/22/2023    ALBUMIN 3.5 05/22/2023    BILITOT 0.7 05/22/2023    ALKPHOS 54 (L) 05/22/2023    AST 27 05/22/2023    ALT 23 05/22/2023    ANIONGAP 7 (L) 05/22/2023    ESTGFRAFRICA >60.0 07/11/2022    EGFRNONAA >60.0 07/11/2022    TSH 2.897 07/05/2022     Vit D, 25-Hydroxy   Date Value Ref Range Status   05/22/2023 32 30 - 96 ng/mL Final     Comment:     Vitamin D deficiency.........<10 ng/mL                              Vitamin D insufficiency......10-29 ng/mL       Vitamin D sufficiency........> or equal to 30 ng/mL  Vitamin D toxicity............>100 ng/mL       Assessment and Plan     1. Type 2 diabetes mellitus with microalbuminuria, without long-term current use of insulin  insulin aspart U-100 (NOVOLOG FLEXPEN U-100 INSULIN) 100 unit/mL (3 mL) InPn pen    Comprehensive Metabolic Panel    " Hemoglobin A1C    Lipid Panel    Microalbumin/Creatinine Ratio, Urine      2. Senile osteoporosis  DXA Bone Density Axial Skeleton 1 or more sites          Type 2 diabetes mellitus with microalbuminuria, without long-term current use of insulin  -- Labs prior to follow up.  -- History of chronic pancreatitis. Low C peptide - manage as T1DM.  -- Brittle diabetic.  -- A1c goal < or = 8%.  -- Medications discussed:  Insulin   -- Reviewed logs/CGM:  Glucose variable.  Hypoglycemia reduced in frequency!  Not interested in personal CGM.  -- I suspect some variability is due to injection sites - rotate more.  -- Medication Changes:   Levemir 5 units twice daily   Novolog 4-5-4 units before meals   Add correction scale if needed.   Blood sugar 180 to 230 add 1 units   Blood sugar 231 to 280 add 2 units   Blood sugar greater than 281 add 3 units    -- Reviewed goals of therapy are to get the best control we can without hypoglycemia.  -- Reviewed patient's current insulin regimen. Clarified proper insulin dose and timing in relation to meals, etc. Insulin injection sites and proper rotation instructed.   -- Advised frequent self blood glucose monitoring.  Patient encouraged to document glucose results and bring them to every clinic visit.  -- Hypoglycemia precautions discussed. Instructed on precautions before driving.    -- Call for Bg repeatedly < 90 or > 180.   -- Close adherence to lifestyle changes recommended.   -- Periodic follow ups for eye evaluations, foot care and dental care suggested.    Senile osteoporosis  -- Risks include low weight,  race, menopause, prior chronic glucocorticoid use.  -- Reviewed basics of quantity versus quality.  -- Reassuring they are not fracturing.  -- Recommend:  -Pharmacological therapy is recommended for patients with osteopenia if the 10 year probability of a hip fracture is >3% and 10 year probability of other major osteoporotic fractures is >20%.  Treatment options and  potential side effects discussed for PO bisphosphonates, reclast, Denosumab, and Teriparatide.   -Treatment:   Actonel 8/2018 - 12/2021  She was given first dose of Prolia in 2/2018 and developed joint pains and lower ext edema so it was decided that she would not receive another dose of Prolia. We decided against Reclast for the same reason and instead restarted her on an oral bisphosphonate which she has tolerated without side effects. After her last visit 12/2021 we decided to stop the oral bisphosphonate and try Prolia again. She received Prolia  in 1/2022 without any side effects.  Last Dose: 2/2023  Continue Prolia every 6 months.  -Calcium and Vitamin D RDD provided.  -Exercise: recommended..  -Fall precautions made in the home.  -Alerted that if dental work needs to be done it should be done prior to initiating therapy. Dental health: UTD.  -- Repeat DEXA scan 11/2023.        Follow up in about 4 months (around 9/25/2023).

## 2023-05-25 ENCOUNTER — OFFICE VISIT (OUTPATIENT)
Dept: ENDOCRINOLOGY | Facility: CLINIC | Age: 88
End: 2023-05-25
Payer: MEDICARE

## 2023-05-25 VITALS
BODY MASS INDEX: 16.66 KG/M2 | DIASTOLIC BLOOD PRESSURE: 79 MMHG | WEIGHT: 100 LBS | SYSTOLIC BLOOD PRESSURE: 148 MMHG | HEIGHT: 65 IN | HEART RATE: 68 BPM | OXYGEN SATURATION: 98 %

## 2023-05-25 DIAGNOSIS — R80.9 TYPE 2 DIABETES MELLITUS WITH MICROALBUMINURIA, WITHOUT LONG-TERM CURRENT USE OF INSULIN: Primary | ICD-10-CM

## 2023-05-25 DIAGNOSIS — E11.29 TYPE 2 DIABETES MELLITUS WITH MICROALBUMINURIA, WITHOUT LONG-TERM CURRENT USE OF INSULIN: Primary | ICD-10-CM

## 2023-05-25 DIAGNOSIS — M81.0 SENILE OSTEOPOROSIS: ICD-10-CM

## 2023-05-25 PROCEDURE — 99999 PR PBB SHADOW E&M-EST. PATIENT-LVL III: CPT | Mod: PBBFAC,,, | Performed by: NURSE PRACTITIONER

## 2023-05-25 PROCEDURE — 99214 PR OFFICE/OUTPT VISIT, EST, LEVL IV, 30-39 MIN: ICD-10-PCS | Mod: S$PBB,,, | Performed by: NURSE PRACTITIONER

## 2023-05-25 PROCEDURE — 99213 OFFICE O/P EST LOW 20 MIN: CPT | Mod: PBBFAC | Performed by: NURSE PRACTITIONER

## 2023-05-25 PROCEDURE — 99999 PR PBB SHADOW E&M-EST. PATIENT-LVL III: ICD-10-PCS | Mod: PBBFAC,,, | Performed by: NURSE PRACTITIONER

## 2023-05-25 PROCEDURE — 99214 OFFICE O/P EST MOD 30 MIN: CPT | Mod: S$PBB,,, | Performed by: NURSE PRACTITIONER

## 2023-05-25 RX ORDER — INSULIN ASPART 100 [IU]/ML
INJECTION, SOLUTION INTRAVENOUS; SUBCUTANEOUS
Qty: 30 ML | Refills: 3 | Status: ON HOLD | OUTPATIENT
Start: 2023-05-25 | End: 2024-04-02 | Stop reason: HOSPADM

## 2023-05-25 NOTE — ASSESSMENT & PLAN NOTE
-- Labs prior to follow up.  -- History of chronic pancreatitis. Low C peptide - manage as T1DM.  -- Brittle diabetic.  -- A1c goal < or = 8%.  -- Medications discussed:  Insulin   -- Reviewed logs/CGM:  Glucose variable.  Hypoglycemia reduced in frequency!  Not interested in personal CGM.  -- I suspect some variability is due to injection sites - rotate more.  -- Medication Changes:   Levemir 5 units twice daily   Novolog 4-5-4 units before meals   Add correction scale if needed.   Blood sugar 180 to 230 add 1 units   Blood sugar 231 to 280 add 2 units   Blood sugar greater than 281 add 3 units    -- Reviewed goals of therapy are to get the best control we can without hypoglycemia.  -- Reviewed patient's current insulin regimen. Clarified proper insulin dose and timing in relation to meals, etc. Insulin injection sites and proper rotation instructed.   -- Advised frequent self blood glucose monitoring.  Patient encouraged to document glucose results and bring them to every clinic visit.  -- Hypoglycemia precautions discussed. Instructed on precautions before driving.    -- Call for Bg repeatedly < 90 or > 180.   -- Close adherence to lifestyle changes recommended.   -- Periodic follow ups for eye evaluations, foot care and dental care suggested.

## 2023-05-25 NOTE — PATIENT INSTRUCTIONS
Instructed to send glucose logs in 14 days.  Reach out to me sooner for any glucose <70 or consistently >200.

## 2023-05-25 NOTE — ASSESSMENT & PLAN NOTE
-- Risks include low weight,  race, menopause, prior chronic glucocorticoid use.  -- Reviewed basics of quantity versus quality.  -- Reassuring they are not fracturing.  -- Recommend:  -Pharmacological therapy is recommended for patients with osteopenia if the 10 year probability of a hip fracture is >3% and 10 year probability of other major osteoporotic fractures is >20%.  Treatment options and potential side effects discussed for PO bisphosphonates, reclast, Denosumab, and Teriparatide.   -Treatment:   Actonel 8/2018 - 12/2021  She was given first dose of Prolia in 2/2018 and developed joint pains and lower ext edema so it was decided that she would not receive another dose of Prolia. We decided against Reclast for the same reason and instead restarted her on an oral bisphosphonate which she has tolerated without side effects. After her last visit 12/2021 we decided to stop the oral bisphosphonate and try Prolia again. She received Prolia  in 1/2022 without any side effects.  Last Dose: 2/2023  Continue Prolia every 6 months.  -Calcium and Vitamin D RDD provided.  -Exercise: recommended..  -Fall precautions made in the home.  -Alerted that if dental work needs to be done it should be done prior to initiating therapy. Dental health: UTD.  -- Repeat DEXA scan 11/2023.

## 2023-05-31 ENCOUNTER — TELEPHONE (OUTPATIENT)
Dept: ENDOSCOPY | Facility: HOSPITAL | Age: 88
End: 2023-05-31

## 2023-05-31 ENCOUNTER — CLINICAL SUPPORT (OUTPATIENT)
Dept: ENDOSCOPY | Facility: HOSPITAL | Age: 88
End: 2023-05-31
Attending: HOSPITALIST
Payer: MEDICARE

## 2023-05-31 VITALS — HEIGHT: 65 IN | WEIGHT: 99 LBS | BODY MASS INDEX: 16.5 KG/M2

## 2023-05-31 DIAGNOSIS — Z12.11 SCREENING FOR COLON CANCER: Primary | ICD-10-CM

## 2023-05-31 DIAGNOSIS — C18.3 MALIGNANT NEOPLASM OF HEPATIC FLEXURE: ICD-10-CM

## 2023-05-31 NOTE — TELEPHONE ENCOUNTER
Patient has an order for a colonoscopy from Dr English for malignant neoplasm of hepatic flexure.  She saw Dr Holman in the past and the recommendation was for a colonoscopy in 1 year.  Her procedure was done on 10/25/22.  Is she ready to schedule at this time? Or does she need to wait until 10/2023?  Please advise.  Thank you.    Tanvi

## 2023-06-05 ENCOUNTER — TELEPHONE (OUTPATIENT)
Dept: ENDOSCOPY | Facility: HOSPITAL | Age: 88
End: 2023-06-05
Payer: MEDICARE

## 2023-06-05 RX ORDER — POLYETHYLENE GLYCOL 3350, SODIUM SULFATE ANHYDROUS, SODIUM BICARBONATE, SODIUM CHLORIDE, POTASSIUM CHLORIDE 236; 22.74; 6.74; 5.86; 2.97 G/4L; G/4L; G/4L; G/4L; G/4L
4 POWDER, FOR SOLUTION ORAL ONCE
Qty: 4000 ML | Refills: 0 | Status: SHIPPED | OUTPATIENT
Start: 2023-06-05 | End: 2023-06-05

## 2023-06-07 DIAGNOSIS — R80.9 TYPE 2 DIABETES MELLITUS WITH MICROALBUMINURIA, WITHOUT LONG-TERM CURRENT USE OF INSULIN: ICD-10-CM

## 2023-06-07 DIAGNOSIS — E11.29 TYPE 2 DIABETES MELLITUS WITH MICROALBUMINURIA, WITHOUT LONG-TERM CURRENT USE OF INSULIN: ICD-10-CM

## 2023-06-07 RX ORDER — LANCETS
EACH MISCELLANEOUS
Qty: 200 EACH | Refills: 3 | Status: SHIPPED | OUTPATIENT
Start: 2023-06-07 | End: 2023-10-16 | Stop reason: SDUPTHER

## 2023-06-23 ENCOUNTER — ANESTHESIA EVENT (OUTPATIENT)
Dept: ENDOSCOPY | Facility: HOSPITAL | Age: 88
End: 2023-06-23
Payer: MEDICARE

## 2023-06-25 ENCOUNTER — NURSE TRIAGE (OUTPATIENT)
Dept: ADMINISTRATIVE | Facility: CLINIC | Age: 88
End: 2023-06-25
Payer: MEDICARE

## 2023-06-25 NOTE — TELEPHONE ENCOUNTER
Spoke with patient states she is scheduled to have a colonoscopy tomorrow. Patient needs clarity on how she should take novolog. Paula takes novolog three times a day and uses a correction scale.  She states typically she takes 4 units for breakfast, 5 units for lunch, and 4 units for dinner.  She states on her paper there is a note stating take dose every 4 hours as needed while taking clear liquid diet. Patient states they told her to take 50% of the novolog dose today.  She has already taken 2 units this morning.   Patient wants to know what that means.  She is clear on how to take her levemir as she was instructed to take 80 % of the dose.  Spoke with on call provider Dr. Coe who states he is okay with speaking with patient to clarify.  Patient connected with provider. No further assistance needed from nurse on call line.   Reason for Disposition   [1] Caller has URGENT medicine question about med that PCP or specialist prescribed AND [2] triager unable to answer question    Protocols used: Medication Question Call-A-

## 2023-06-26 ENCOUNTER — HOSPITAL ENCOUNTER (OUTPATIENT)
Facility: HOSPITAL | Age: 88
Discharge: HOME | End: 2023-06-26
Attending: COLON & RECTAL SURGERY | Admitting: COLON & RECTAL SURGERY
Payer: MEDICARE

## 2023-06-26 ENCOUNTER — TELEPHONE (OUTPATIENT)
Dept: ENDOSCOPY | Facility: HOSPITAL | Age: 88
End: 2023-06-26
Payer: MEDICARE

## 2023-06-26 ENCOUNTER — ANESTHESIA (OUTPATIENT)
Dept: ENDOSCOPY | Facility: HOSPITAL | Age: 88
End: 2023-06-26
Payer: MEDICARE

## 2023-06-26 VITALS
TEMPERATURE: 98 F | WEIGHT: 99 LBS | OXYGEN SATURATION: 100 % | HEIGHT: 65 IN | RESPIRATION RATE: 16 BRPM | DIASTOLIC BLOOD PRESSURE: 72 MMHG | HEART RATE: 88 BPM | BODY MASS INDEX: 16.5 KG/M2 | SYSTOLIC BLOOD PRESSURE: 150 MMHG

## 2023-06-26 DIAGNOSIS — J45.909 ASTHMA, UNSPECIFIED ASTHMA SEVERITY, UNSPECIFIED WHETHER COMPLICATED, UNSPECIFIED WHETHER PERSISTENT: Primary | ICD-10-CM

## 2023-06-26 DIAGNOSIS — Z85.038 HISTORY OF COLON CANCER: Primary | ICD-10-CM

## 2023-06-26 DIAGNOSIS — Z12.11 COLON CANCER SCREENING: ICD-10-CM

## 2023-06-26 DIAGNOSIS — K86.1 CHRONIC PANCREATITIS, UNSPECIFIED PANCREATITIS TYPE: ICD-10-CM

## 2023-06-26 LAB
POCT GLUCOSE: 119 MG/DL (ref 70–110)
POCT GLUCOSE: 122 MG/DL (ref 70–110)

## 2023-06-26 PROCEDURE — D9220A PRA ANESTHESIA: ICD-10-PCS | Mod: CRNA,,, | Performed by: NURSE ANESTHETIST, CERTIFIED REGISTERED

## 2023-06-26 PROCEDURE — 37000008 HC ANESTHESIA 1ST 15 MINUTES: Performed by: COLON & RECTAL SURGERY

## 2023-06-26 PROCEDURE — G0105 COLORECTAL SCRN; HI RISK IND: HCPCS | Mod: ,,, | Performed by: COLON & RECTAL SURGERY

## 2023-06-26 PROCEDURE — D9220A PRA ANESTHESIA: ICD-10-PCS | Mod: ANES,,, | Performed by: ANESTHESIOLOGY

## 2023-06-26 PROCEDURE — G0105 COLORECTAL SCRN; HI RISK IND: HCPCS | Performed by: COLON & RECTAL SURGERY

## 2023-06-26 PROCEDURE — 63600175 PHARM REV CODE 636 W HCPCS: Performed by: NURSE ANESTHETIST, CERTIFIED REGISTERED

## 2023-06-26 PROCEDURE — 82962 GLUCOSE BLOOD TEST: CPT | Performed by: COLON & RECTAL SURGERY

## 2023-06-26 PROCEDURE — 37000009 HC ANESTHESIA EA ADD 15 MINS: Performed by: COLON & RECTAL SURGERY

## 2023-06-26 PROCEDURE — G0105 COLORECTAL SCRN; HI RISK IND: ICD-10-PCS | Mod: ,,, | Performed by: COLON & RECTAL SURGERY

## 2023-06-26 PROCEDURE — 25000003 PHARM REV CODE 250: Performed by: NURSE ANESTHETIST, CERTIFIED REGISTERED

## 2023-06-26 PROCEDURE — D9220A PRA ANESTHESIA: Mod: CRNA,,, | Performed by: NURSE ANESTHETIST, CERTIFIED REGISTERED

## 2023-06-26 PROCEDURE — D9220A PRA ANESTHESIA: Mod: ANES,,, | Performed by: ANESTHESIOLOGY

## 2023-06-26 RX ORDER — PANCRELIPASE 24000; 76000; 120000 [USP'U]/1; [USP'U]/1; [USP'U]/1
CAPSULE, DELAYED RELEASE PELLETS ORAL
Qty: 270 CAPSULE | Refills: 11 | Status: SHIPPED | OUTPATIENT
Start: 2023-06-26 | End: 2023-06-29 | Stop reason: SDUPTHER

## 2023-06-26 RX ORDER — SODIUM CHLORIDE 9 MG/ML
INJECTION, SOLUTION INTRAVENOUS CONTINUOUS
Status: DISCONTINUED | OUTPATIENT
Start: 2023-06-26 | End: 2023-06-26 | Stop reason: HOSPADM

## 2023-06-26 RX ORDER — PHENYLEPHRINE HYDROCHLORIDE 10 MG/ML
INJECTION INTRAVENOUS
Status: DISCONTINUED | OUTPATIENT
Start: 2023-06-26 | End: 2023-06-26

## 2023-06-26 RX ORDER — PROPOFOL 10 MG/ML
VIAL (ML) INTRAVENOUS
Status: DISCONTINUED | OUTPATIENT
Start: 2023-06-26 | End: 2023-06-26

## 2023-06-26 RX ORDER — PROPOFOL 10 MG/ML
VIAL (ML) INTRAVENOUS CONTINUOUS PRN
Status: DISCONTINUED | OUTPATIENT
Start: 2023-06-26 | End: 2023-06-26

## 2023-06-26 RX ORDER — SODIUM CHLORIDE 0.9 % (FLUSH) 0.9 %
10 SYRINGE (ML) INJECTION
Status: DISCONTINUED | OUTPATIENT
Start: 2023-06-26 | End: 2023-06-26 | Stop reason: HOSPADM

## 2023-06-26 RX ORDER — LIDOCAINE HYDROCHLORIDE 20 MG/ML
INJECTION INTRAVENOUS
Status: DISCONTINUED | OUTPATIENT
Start: 2023-06-26 | End: 2023-06-26

## 2023-06-26 RX ADMIN — PHENYLEPHRINE HYDROCHLORIDE 100 MCG: 10 INJECTION INTRAVENOUS at 07:06

## 2023-06-26 RX ADMIN — SODIUM CHLORIDE: 0.9 INJECTION, SOLUTION INTRAVENOUS at 07:06

## 2023-06-26 RX ADMIN — Medication 150 MCG/KG/MIN: at 07:06

## 2023-06-26 RX ADMIN — LIDOCAINE HYDROCHLORIDE 40 MG: 20 INJECTION INTRAVENOUS at 07:06

## 2023-06-26 RX ADMIN — PROPOFOL 60 MG: 10 INJECTION, EMULSION INTRAVENOUS at 07:06

## 2023-06-26 NOTE — TRANSFER OF CARE
"Anesthesia Transfer of Care Note    Patient: Sussy Costa    Procedure(s) Performed: Procedure(s) (LRB):  COLONOSCOPY (N/A)    Patient location: [facility]    Anesthesia Type: general    Transport from OR: Transported from OR on room air with adequate spontaneous ventilation    Post pain: adequate analgesia    Post assessment: no apparent anesthetic complications    Post vital signs: stable    Level of consciousness: awake    Nausea/Vomiting: no nausea/vomiting    Complications: none    Transfer of care protocol was followed      Last vitals:   Visit Vitals  BP (!) 123/58   Pulse 65   Temp 36.8 °C (98.2 °F) (Temporal)   Resp 16   Ht 5' 5" (1.651 m)   Wt 44.9 kg (99 lb)   SpO2 100%   Breastfeeding No   BMI 16.47 kg/m²     "

## 2023-06-26 NOTE — ANESTHESIA POSTPROCEDURE EVALUATION
Anesthesia Post Evaluation    Patient: Sussy Costa    Procedure(s) Performed: Procedure(s) (LRB):  COLONOSCOPY (N/A)    Final Anesthesia Type: general      Patient location during evaluation: [facility]  Patient participation: Yes- Able to Participate  Level of consciousness: awake and alert  Post-procedure vital signs: reviewed and stable  Pain management: adequate  Airway patency: patent    PONV status at discharge: No PONV  Anesthetic complications: no      Cardiovascular status: blood pressure returned to baseline  Respiratory status: unassisted, spontaneous ventilation and room air  Hydration status: euvolemic            Vitals Value Taken Time   /74 06/26/23 0811   Temp 36.8 °C (98.2 °F) 06/26/23 0735   Pulse 88 06/26/23 0815   Resp 16 06/26/23 0815   SpO2 100 % 06/26/23 0815   Vitals shown include unvalidated device data.      No case tracking events are documented in the log.      Pain/Caitie Score: Caitie Score: 10 (6/26/2023  8:00 AM)

## 2023-06-26 NOTE — TELEPHONE ENCOUNTER
Attempted to contact patient. No answer. Pt noted to have procedure done this am. VM left to call Endoscopy Scheduling Dept at [phone] for any queries.

## 2023-06-26 NOTE — H&P
COLONOSCOPY HISTORY AND PHYSICAL EXAM    Procedure : Colonoscopy      INDICATIONS: personal history of colon cancer    Family Hx of CRC: none    Last Colonoscopy:  2011  Findings: normal.  Had right colectomy last year for obstructing mass       Past Medical History:   Diagnosis Date    Asthma     Cyst of pancreas     liver    Diabetes mellitus type II     Eczema of hand     Glaucoma     History of recurrent pneumonia 9/28/2020    Hyperlipidemia     Hypertension     Lichen planus     gums    Osteoporosis     Pulmonary nodules      Sedation Problems: NO  Family History   Problem Relation Age of Onset    Heart disease Father     Heart disease Brother     Hypertension Brother      Fam Hx of Sedation Problems: NO  Social History     Socioeconomic History    Marital status:      Spouse name: chuck    Number of children: 1   Occupational History    Occupation:    Tobacco Use    Smoking status: Never     Passive exposure: Never    Smokeless tobacco: Never   Substance and Sexual Activity    Alcohol use: Yes     Comment: socially    Drug use: No    Sexual activity: Yes     Partners: Male     Birth control/protection: Post-menopausal   Social History Narrative    Lives in [place] with  Chuck. Daughter passed away in 2017 with leukemia. Two adult grandchildren. Former .     Social Determinants of Health     Financial Resource Strain: Unknown    Difficulty of Paying Living Expenses: Patient refused   Food Insecurity: Unknown    Worried About Running Out of Food in the Last Year: Patient refused    Ran Out of Food in the Last Year: Patient refused   Transportation Needs: No Transportation Needs    Lack of Transportation (Medical): No    Lack of Transportation (Non-Medical): No   Physical Activity: Unknown    Days of Exercise per Week: 1 day   Stress: No Stress Concern Present    Feeling of Stress : Not at all   Social Connections: Unknown    Frequency of Communication with Friends and  "Family: Twice a week    Frequency of Social Gatherings with Friends and Family: Once a week    Active Member of Clubs or Organizations: No    Attends Club or Organization Meetings: Never    Marital Status:    Housing Stability: Unknown    Unable to Pay for Housing in the Last Year: Patient refused    Unstable Housing in the Last Year: Patient refused       Review of Systems - Negative except   Respiratory ROS: no dyspnea  Cardiovascular ROS: no exertional chest pain  Gastrointestinal ROS: NO abdominal discomfort,  NO rectal bleeding  Musculoskeletal ROS: no muscular pain  Neurological ROS: no recent stroke    Physical Exam:  BP (!) 185/79   Pulse 69   Temp 98.1 °F (36.7 °C)   Resp 16   Ht 5' 5" (1.651 m)   Wt 44.9 kg (99 lb)   SpO2 99%   Breastfeeding No   BMI 16.47 kg/m²   General: no distress  Head: normocephalic  Mallampati Score   Neck: supple, symmetrical, trachea midline  Lungs:  normal respiratory effort  Heart: regular rate and rhythm  Abdomen: soft, non-tender non-distented; bowel sounds normal; no masses,  no organomegaly  Extremities: no cyanosis or edema, or clubbing    ASA:  III    PLAN  COLONOSCOPY.    SedationPlan :MAC    The details of the procedure, the possible need for biopsy or polypectomy and the potential risks including bleeding, perforation, missed polyps were discussed in detail.      "

## 2023-06-26 NOTE — PROVATION PATIENT INSTRUCTIONS
Discharge Summary/Instructions after an Endoscopic Procedure  Patient Name: Sussy Costa  Patient MRN: [id number]  Patient YOB: 1935  Monday, June 26, 2023  Jass Holman MD  Dear patient,  As a result of recent federal legislation (The Federal Cures Act), you may   receive lab or pathology results from your procedure in your [facility]   account before your physician is able to contact you. Your physician or   their representative will relay the results to you with their   recommendations at their soonest availability.  Thank you,  RESTRICTIONS:  During your procedure today, you received medications for sedation.  These   medications may affect your judgment, balance and coordination.  Therefore,   for 24 hours, you have the following restrictions:   - DO NOT drive a car, operate machinery, make legal/financial decisions,   sign important papers or drink alcohol.    ACTIVITY:  Today: no heavy lifting, straining or running due to procedural   sedation/anesthesia.  The following day: return to full activity including work.  DIET:  Eat and drink normally unless instructed otherwise.     TREATMENT FOR COMMON SIDE EFFECTS:  - Mild abdominal pain, nausea, belching, bloating or excessive gas:  rest,   eat lightly and use a heating pad.  - Sore Throat: treat with throat lozenges and/or gargle with warm salt   water.  - Because air was used during the procedure, expelling large amounts of air   from your rectum or belching is normal.  - If a bowel prep was taken, you may not have a bowel movement for 1-3 days.    This is normal.  SYMPTOMS TO WATCH FOR AND REPORT TO YOUR PHYSICIAN:  1. Abdominal pain or bloating, other than gas cramps.  2. Chest pain.  3. Back pain.  4. Signs of infection such as: chills or fever occurring within 24 hours   after the procedure.  5. Rectal bleeding, which would show as bright red, maroon, or black stools.   (A tablespoon of blood from the rectum is not serious, especially if    hemorrhoids are present.)  6. Vomiting.  7. Weakness or dizziness.  GO DIRECTLY TO THE NEAREST EMERGENCY ROOM IF YOU HAVE ANY OF THE FOLLOWING:      Difficulty breathing              Chills and/or fever over 101 F   Persistent vomiting and/or vomiting blood   Severe abdominal pain   Severe chest pain   Black, tarry stools   Bleeding- more than one tablespoon   Any other symptom or condition that you feel may need urgent attention  Your doctor recommends these additional instructions:  If any biopsies were taken, your doctors clinic will contact you in 1 to 2   weeks with any results.  - Discharge patient to home (ambulatory).   - Patient has a contact number available for emergencies.  The signs and   symptoms of potential delayed complications were discussed with the   patient.  Return to normal activities tomorrow.  Written discharge   instructions were provided to the patient.   - Resume previous diet.   - Continue present medications.   - Return to primary care physician as previously scheduled.   - Repeat colonoscopy is not recommended for surveillance.  For questions, problems or results please call your physician - Jass Holman MD at Work:  [phone].  [facility], EMERGENCY ROOM PHONE NUMBER: [phone]  IF A COMPLICATION OR EMERGENCY SITUATION ARISES AND YOU ARE UNABLE TO REACH   YOUR PHYSICIAN - GO DIRECTLY TO THE EMERGENCY ROOM.  Jass Holman MD  6/26/2023 7:36:38 AM  This report has been verified and signed electronically.  Dear patient,  As a result of recent federal legislation (The Federal Cures Act), you may   receive lab or pathology results from your procedure in your [facility]   account before your physician is able to contact you. Your physician or   their representative will relay the results to you with their   recommendations at their soonest availability.  Thank you,  PROVATION

## 2023-06-26 NOTE — ANESTHESIA PREPROCEDURE EVALUATION
06/26/2023  Sussy Costa is a 87 y.o., female.  [facility]  Anesthesia Pre-Operative Evaluation       Patient Name: Sussy Costa  YOB: 1935  MRN: [id number]  [facility]: [id number]      Code Status: Prior   Date of Procedure: 6/26/2023  Anesthesia: Choice Procedure: Procedure(s) (LRB):  COLONOSCOPY (N/A)  Pre-Operative Diagnosis: Malignant neoplasm of hepatic flexure [C18.3]  Proceduralist: Surgeon(s) and Role:     * Jass Holman MD - Primary Nurse: (Unknown)      SUBJECTIVE:   Sussy Costa is a 87 y.o. female who  has a past medical history of Asthma, Cyst of pancreas, Diabetes mellitus type II, Eczema of hand, Glaucoma, History of recurrent pneumonia (9/28/2020), Hyperlipidemia, Hypertension, Lichen planus, Osteoporosis, and Pulmonary nodules. No notes on file    she has a current medication list which includes the following long-term medication(s): albuterol, atorvastatin, chlorthalidone, clobetasol, fluticasone propion-salmeterol, fluticasone-salmeterol 500-50 mcg/dose, hydrocortisone, insulin aspart u-100, levemir flextouch u100 insulin, irbesartan, levocetirizine, creon, and [DISCONTINUED] risedronate.   ALLERGIES:     Review of patient's allergies indicates:   Allergen Reactions    Aspirin Other (See Comments)     Asthma    TRIAD OF NASAL POLYPS, ASA  ALLERGY AND ASTHMA.        Aspirin Other (See Comments)    Nsaids (non-steroidal anti-inflammatory drug) Other (See Comments)     AVOID DUE TO TRIAD OF ASA ALLERGY, NASAL POLYPS,AND ASTHMA  AVOID DUE TO TRIAD OF ASA ALLERGY, NASAL POLYPS,AND ASTHMA    Penicillin      Other reaction(s): Rash    9/30/20: tolerates ceftriaxone    Penicillin g Other (See Comments)     Other reaction(s): Rash    9/30/20: tolerates ceftriaxone       History:   There are no hospital problems to display for this patient.    Past  Medical History:   Diagnosis Date    Asthma     Cyst of pancreas     liver    Diabetes mellitus type II     Eczema of hand     Glaucoma     History of recurrent pneumonia 9/28/2020    Hyperlipidemia     Hypertension     Lichen planus     gums    Osteoporosis     Pulmonary nodules      Patient Active Problem List   Diagnosis    HTN (hypertension)    Hyperlipidemia    Glaucoma    OA (osteoarthritis)    Hip pain    Left knee pain    Pancreas cyst    Senile osteoporosis    Spinal stenosis of lumbar region    Anterolisthesis    Lumbar spondylosis    Diastolic dysfunction    Pneumonia of left lower lobe due to Pseudomonas species    Asthma, moderate persistent    Colon cancer    Impaired mobility    Type 2 diabetes mellitus with microalbuminuria, without long-term current use of insulin    Pancreatic insufficiency    Carcinoid bronchial adenoma, right    Other chronic pancreatitis    Atrial fibrillation, unspecified type    Aortic atherosclerosis       Surgical History:    has a past surgical history that includes Hysterectomy; Cholecystectomy; nasal polyps; Cataract extraction, bilateral; Epidural steroid injection into lumbar spine (N/A, 11/8/2018); Functional endoscopic sinus surgery (FESS) using computer-assisted navigation (N/A, 6/17/2019); Endoscopic ultrasound of upper gastrointestinal tract (N/A, 4/22/2022); Bronchoscopy (N/A, 5/13/2022); and colectomy, right (Right, 10/25/2022).   Social History:     reports that she has never smoked. She has never been exposed to tobacco smoke. She has never used smokeless tobacco. She reports current alcohol use. She reports that she does not use drugs.     OBJECTIVE:     Vital Signs (Most Recent):    Vital Signs Range (Last 24H):          There is no height or weight on file to calculate BMI.   Wt Readings from Last 4 Encounters:   05/31/23 44.9 kg (99 lb)   05/25/23 45.4 kg (99 lb 15.7 oz)   05/12/23 45.8 kg (101 lb)   05/10/23 46 kg (101 lb  6.6 oz)     Significant Labs:  Lab Results   Component Value Date    WBC 8.88 05/04/2023    HGB 11.5 (L) 05/04/2023    HCT 36.1 (L) 05/04/2023     05/04/2023     05/22/2023    K 4.9 05/22/2023     05/22/2023    CREATININE 1.0 05/22/2023    BUN 18 05/22/2023    CO2 26 05/22/2023     (H) 05/22/2023    CALCIUM 10.1 05/22/2023    MG 1.4 (L) 11/02/2022    PHOS 1.8 (L) 11/02/2022    ALKPHOS 54 (L) 05/22/2023    ALT 23 05/22/2023    AST 27 05/22/2023    ALBUMIN 3.5 05/22/2023    INR 1.1 12/28/2022    APTT 51.4 (H) 07/06/2022    HGBA1C 7.4 (H) 05/22/2023    TROPONINI 0.015 07/05/2022     (H) 11/30/2022     No LMP recorded. Patient has had a hysterectomy.  No results found for this or any previous visit (from the past 72 hour(s)).    EKG:   Results for orders placed or performed during the hospital encounter of 10/25/22   EKG 12-lead    Collection Time: 10/31/22 10:58 AM    Narrative    Test Reason : I48.91,    Vent. Rate : 117 BPM     Atrial Rate : 117 BPM     P-R Int : 140 ms          QRS Dur : 110 ms      QT Int : 386 ms       P-R-T Axes : -07 038 015 degrees     QTc Int : 538 ms    Sinus tachycardia with Premature supraventricular complexes and Fusion  complexes  Low voltage, precordial leads  Abnormal R wave progression  Nonspecific T wave abnormality  Prolonged QT  Abnormal ECG  When compared with ECG of 30-OCT-2022 22:34,  Fusion complexes are now Present    Confirmed by Ventura Starkey MD ([id number]) on 11/1/2022 9:59:52 AM    Referred By: [email or web address]   SELF           Confirmed By:Ventura Starkey MD       TTE:  Results for orders placed or performed during the hospital encounter of 07/05/22   Echo   Result Value Ref Range    Narrative    · The left ventricle is normal in size with concentric remodeling and   normal systolic function.  · The estimated ejection fraction is 60%.  · Indeterminate left ventricular diastolic function.  · Normal right ventricular size with normal right ventricular systolic    function.  · There is mild mitral stenosis. Sclerosis and thickening extends in to   the aortomitral curtain.  · The mean diastolic gradient across the mitral valve is 4 mmHg at a heart   rate of 87 bpm.  · Normal central venous pressure (3 mmHg).  · The estimated PA systolic pressure is 26 mmHg.  · Small posterior pericardial effusion.        EF   Date Value Ref Range Status   07/06/2022 60 % Final   06/04/2021 65 % Final      Results for orders placed or performed in visit on 04/02/18   2D echo with color flow doppler   Result Value Ref Range    EF + QEF 65 55 - 65    Mitral Valve Regurgitation TRIVIAL TO MILD     Aortic Valve Stenosis TRIVIAL     Est. PA Systolic Pressure 39.48     Mitral Valve Mobility MILDLY RESTRICTED     Mitral Valve Stenosis TRIVIAL     Tricuspid Valve Regurgitation TRIVIAL TO MILD        ASSESSMENT/PLAN:         Pre-op Assessment    I have reviewed the Patient Summary Reports.     I have reviewed the Nursing Notes. I have reviewed the NPO Status.   I have reviewed the Medications.     Review of Systems      Physical Exam  General: Well nourished, Cooperative and Alert    Airway:  Mallampati: III / II  Mouth Opening: Normal  TM Distance: Normal  Tongue: Normal  Neck ROM: Normal ROM    Chest/Lungs:  Normal Respiratory Rate    Heart:  Rate: Normal  Rhythm: Regular Rhythm        Anesthesia Plan  Type of Anesthesia, risks & benefits discussed:    Anesthesia Type: Gen Natural Airway, MAC  Intra-op Monitoring Plan: Standard ASA Monitors  Post Op Pain Control Plan: IV/PO Opioids PRN  Induction:  IV  Informed Consent: Informed consent signed with the Patient and all parties understand the risks and agree with anesthesia plan.  All questions answered.   ASA Score: 3  Day of Surgery Review of History & Physical: H&P Update referred to the surgeon/provider.    Ready For Surgery From Anesthesia Perspective.     .

## 2023-06-29 DIAGNOSIS — K86.1 CHRONIC PANCREATITIS, UNSPECIFIED PANCREATITIS TYPE: ICD-10-CM

## 2023-06-29 RX ORDER — PANCRELIPASE 24000; 76000; 120000 [USP'U]/1; [USP'U]/1; [USP'U]/1
CAPSULE, DELAYED RELEASE PELLETS ORAL
Qty: 270 CAPSULE | Refills: 11 | Status: SHIPPED | OUTPATIENT
Start: 2023-06-29 | End: 2024-01-23 | Stop reason: SDUPTHER

## 2023-07-13 ENCOUNTER — HOSPITAL ENCOUNTER (OUTPATIENT)
Dept: PULMONOLOGY | Facility: CLINIC | Age: 88
Discharge: HOME | End: 2023-07-13
Payer: MEDICARE

## 2023-07-13 ENCOUNTER — OFFICE VISIT (OUTPATIENT)
Dept: PULMONOLOGY | Facility: CLINIC | Age: 88
End: 2023-07-13
Payer: MEDICARE

## 2023-07-13 VITALS
DIASTOLIC BLOOD PRESSURE: 72 MMHG | HEIGHT: 65 IN | BODY MASS INDEX: 17.6 KG/M2 | HEART RATE: 70 BPM | OXYGEN SATURATION: 97 % | WEIGHT: 105.63 LBS | SYSTOLIC BLOOD PRESSURE: 136 MMHG

## 2023-07-13 DIAGNOSIS — J45.909 ASTHMA, UNSPECIFIED ASTHMA SEVERITY, UNSPECIFIED WHETHER COMPLICATED, UNSPECIFIED WHETHER PERSISTENT: ICD-10-CM

## 2023-07-13 DIAGNOSIS — J45.909 ASTHMA, UNSPECIFIED ASTHMA SEVERITY, UNSPECIFIED WHETHER COMPLICATED, UNSPECIFIED WHETHER PERSISTENT: Primary | ICD-10-CM

## 2023-07-13 DIAGNOSIS — J45.40 MODERATE PERSISTENT ASTHMA WITHOUT COMPLICATION: ICD-10-CM

## 2023-07-13 DIAGNOSIS — C7A.090 CARCINOID BRONCHIAL ADENOMA, RIGHT: ICD-10-CM

## 2023-07-13 PROCEDURE — 94727 PR PULM FUNCTION TEST BY GAS: ICD-10-PCS | Mod: 26,S$PBB,, | Performed by: INTERNAL MEDICINE

## 2023-07-13 PROCEDURE — 94729 PR C02/MEMBANE DIFFUSE CAPACITY: ICD-10-PCS | Mod: 26,S$PBB,, | Performed by: INTERNAL MEDICINE

## 2023-07-13 PROCEDURE — 99999 PR PBB SHADOW E&M-EST. PATIENT-LVL V: CPT | Mod: PBBFAC,,, | Performed by: INTERNAL MEDICINE

## 2023-07-13 PROCEDURE — 94729 DIFFUSING CAPACITY: CPT | Mod: PBBFAC | Performed by: INTERNAL MEDICINE

## 2023-07-13 PROCEDURE — 99214 PR OFFICE/OUTPT VISIT, EST, LEVL IV, 30-39 MIN: ICD-10-PCS | Mod: S$PBB,25,, | Performed by: INTERNAL MEDICINE

## 2023-07-13 PROCEDURE — 94727 GAS DIL/WSHOT DETER LNG VOL: CPT | Mod: PBBFAC | Performed by: INTERNAL MEDICINE

## 2023-07-13 PROCEDURE — 94729 DIFFUSING CAPACITY: CPT | Mod: 26,S$PBB,, | Performed by: INTERNAL MEDICINE

## 2023-07-13 PROCEDURE — 94727 GAS DIL/WSHOT DETER LNG VOL: CPT | Mod: 26,S$PBB,, | Performed by: INTERNAL MEDICINE

## 2023-07-13 PROCEDURE — 94060 EVALUATION OF WHEEZING: CPT | Mod: PBBFAC | Performed by: INTERNAL MEDICINE

## 2023-07-13 PROCEDURE — 94060 PR EVAL OF BRONCHOSPASM: ICD-10-PCS | Mod: 26,S$PBB,, | Performed by: INTERNAL MEDICINE

## 2023-07-13 PROCEDURE — 99999 PR PBB SHADOW E&M-EST. PATIENT-LVL V: ICD-10-PCS | Mod: PBBFAC,,, | Performed by: INTERNAL MEDICINE

## 2023-07-13 PROCEDURE — 99215 OFFICE O/P EST HI 40 MIN: CPT | Mod: PBBFAC | Performed by: INTERNAL MEDICINE

## 2023-07-13 PROCEDURE — 99214 OFFICE O/P EST MOD 30 MIN: CPT | Mod: S$PBB,25,, | Performed by: INTERNAL MEDICINE

## 2023-07-13 PROCEDURE — 94060 EVALUATION OF WHEEZING: CPT | Mod: 26,S$PBB,, | Performed by: INTERNAL MEDICINE

## 2023-07-13 RX ORDER — FLUTICASONE FUROATE, UMECLIDINIUM BROMIDE AND VILANTEROL TRIFENATATE 200; 62.5; 25 UG/1; UG/1; UG/1
1 POWDER RESPIRATORY (INHALATION) DAILY
Qty: 3 EACH | Refills: 4 | Status: SHIPPED | OUTPATIENT
Start: 2023-07-13 | End: 2024-02-14 | Stop reason: SDUPTHER

## 2023-07-13 NOTE — PROGRESS NOTES
Subjective:       Patient ID: Sussy Costa is a 87 y.o. female.    Chief Complaint: Cough and Asthma    HPI:   Sussy Costa is a 87 y.o. female who presents to establish care for her asthma.    She reports chest congestion since april  Mucinex helps some, but she still has chest congestion  Previously followed by Dr. Peguero    +allergies.   Using advair twice a day.     History of carcinoid s/p surgery.  Some lung mets on imaging.    Review of Systems   Constitutional:  Positive for weight loss.   Respiratory:  Positive for cough and sputum production (clear, white). Negative for shortness of breath, wheezing and dyspnea on extertion.        Social History     Tobacco Use    Smoking status: Never     Passive exposure: Never    Smokeless tobacco: Never   Substance Use Topics    Alcohol use: Yes     Comment: socially       Review of patient's allergies indicates:   Allergen Reactions    Aspirin Other (See Comments)     Asthma    TRIAD OF NASAL POLYPS, ASA  ALLERGY AND ASTHMA.        Aspirin Other (See Comments)    Nsaids (non-steroidal anti-inflammatory drug) Other (See Comments)     AVOID DUE TO TRIAD OF ASA ALLERGY, NASAL POLYPS,AND ASTHMA  AVOID DUE TO TRIAD OF ASA ALLERGY, NASAL POLYPS,AND ASTHMA    Penicillin      Other reaction(s): Rash    9/30/20: tolerates ceftriaxone    Penicillin g Other (See Comments)     Other reaction(s): Rash    9/30/20: tolerates ceftriaxone     Past Medical History:   Diagnosis Date    Asthma     Cyst of pancreas     liver    Diabetes mellitus type II     Eczema of hand     Glaucoma     History of recurrent pneumonia 9/28/2020    Hyperlipidemia     Hypertension     Lichen planus     gums    Osteoporosis     Pulmonary nodules      Past Surgical History:   Procedure Laterality Date    BRONCHOSCOPY N/A 5/13/2022    Procedure: Bronchoscopy;  Surgeon: Tina Diagnostic Provider;  Location: [facility] OR [address] [place] [address];  Service: Anesthesiology;  Laterality: N/A;    CATARACT EXTRACTION, BILATERAL       CHOLECYSTECTOMY      COLECTOMY, RIGHT Right 10/25/2022    Procedure: COLECTOMY, RIGHT;  Surgeon: Jass Holman MD;  Location: [facility] OR 2ND FLR;  Service: Colon and Rectal;  Laterality: Right;    COLONOSCOPY N/A 6/26/2023    Procedure: COLONOSCOPY;  Surgeon: Jass Holman MD;  Location: [facility] ENDO (2ND FLR);  Service: Endoscopy;  Laterality: N/A;  2nd floor -lung disease  referral Dr MartinezOxydvry-kznn-molir portal/mailed-GT  6/20/23- Precall confirmed- [place]    ENDOSCOPIC ULTRASOUND OF UPPER GASTROINTESTINAL TRACT N/A 4/22/2022    Procedure: ULTRASOUND, UPPER GI TRACT, ENDOSCOPIC;  Surgeon: Max Rosado MD;  Location: [facility] ENDO (2ND FLR);  Service: Endoscopy;  Laterality: N/A;  urgent EUS (linear) for abnormal CT scan with dilated PD and increased cyst of pancreas cyst.  pap 44 mmHg  4/13:fully vaccinated. instructions via portal-SC    EPIDURAL STEROID INJECTION INTO LUMBAR SPINE N/A 11/8/2018    Procedure: Injection-steroid-epidural-lumbar L5-S1;  Surgeon: Nicci Osorio Jr., MD;  Location: [facility] PAIN MGT;  Service: Pain Management;  Laterality: N/A;    FUNCTIONAL ENDOSCOPIC SINUS SURGERY (FESS) USING COMPUTER-ASSISTED NAVIGATION N/A 6/17/2019    Procedure: FESS, USING COMPUTER-ASSISTED NAVIGATION;  Surgeon: Rosangela Isabel MD;  Location: [facility] OR;  Service: ENT;  Laterality: N/A;  video    HYSTERECTOMY      nasal polyps       Current Outpatient Medications on File Prior to Visit   Medication Sig    lancets (ACCU-CHEK FASTCLIX LANCET DRUM) [facility] CHECK BLOOD GLUCOSE FOUR TIMES DAILY    ACCU-CHEK GUIDE TEST STRIPS Strp TEST FOUR TIMES DAILY WITH MEALS AND NIGHTLY    albuterol (PROVENTIL) 2.5 mg /3 mL (0.083 %) nebulizer solution Take 3 mLs (2.5 mg total) by nebulization every 6 (six) hours as needed for Wheezing or Shortness of Breath. Rescue    atorvastatin (LIPITOR) 20 MG tablet Take 1 tablet (20 mg total) by mouth every evening.    blood sugar diagnostic Strp 1 strip by Misc.(Non-Drug; Combo  Route) route 4 (four) times daily with meals and nightly.    budesonide 1 mg/2 mL [facility] SMARTSI Vial(s) Both Nares Twice Daily    chlorthalidone (HYGROTEN) 25 MG Tab Take 1 tablet (25 mg total) by mouth once daily.    fluticasone propion-salmeteroL 232-14 mcg/actuation AePB Inhale 1 puff into the lungs 2 (two) times daily.    fluticasone-salmeterol diskus inhaler 500-50 mcg Inhale 1 puff into the lungs 2 (two) times daily. Controller    hydrocortisone 2.5 % cream Apply topically 2 (two) times daily. For one week only    insulin aspart U-100 (NOVOLOG FLEXPEN U-100 INSULIN) 100 unit/mL (3 mL) InPn pen Inject 4 Units into the skin before breakfast AND 5 Units daily with lunch AND 4 Units daily with dinner or evening meal. Plus correction scale. Max TDD 30 units..    insulin detemir U-100, Levemir, (LEVEMIR FLEXTOUCH U-100 INSULN) 100 unit/mL (3 mL) InPn pen Inject 5 Units into the skin 2 (two) times daily.    irbesartan (AVAPRO) 300 MG tablet Take 1 tablet (300 mg total) by mouth every evening.    latanoprost 0.005 % ophthalmic solution Inject into the eye. 1 Drops Ophthalmic Every evening    levocetirizine (XYZAL) 5 MG tablet Take 1 tablet (5 mg total) by mouth every evening.    LIDOcaine 3 % Crea Apply 1 application topically 2 (two) times a day.    lipase-protease-amylase 24,000-76,000-120,000 units (CREON) 24,000-76,000 -120,000 unit capsule Take 2 capsules with meals orally and 1 capsule with snacks.    magnesium oxide (MAG-OX) 400 mg (241.3 mg magnesium) tablet Take 400 mg by mouth once daily.    montelukast (SINGULAIR) 10 mg tablet TAKE 1 TABLET BY MOUTH EVERY EVENING    NEILMED SINUS RINSE REFILL Pack use as directed    olopatadine (PATADAY) 0.2 % Drop Place into both eyes.    olopatadine (PATADAY) 0.2 % Drop Apply 1 drop to eye daily as needed.    olopatadine (PATANOL) 0.1 % ophthalmic solution Place 1 drop into both eyes 2 (two) times daily as needed. 1 Drops Ophthalmic Twice a day     pen needle, diabetic  "31 gauge x 5/16" Ndle Use with insulin pen 5 times a day    vitamin D (VITAMIN D3) 1000 units Tab Take 1,000 Units by mouth once daily.    [DISCONTINUED] risedronate (ACTONEL) 35 MG tablet Take 1 tablet (35 mg total) by mouth every 7 days.     No current facility-administered medications on file prior to visit.       Objective:      Vitals:    07/13/23 1458   BP: 136/72   Pulse: 70   SpO2: 97%   Weight: 47.9 kg (105 lb 9.6 oz)   Height: 5' 5" (1.651 m)     Physical Exam   Constitutional: She is oriented to person, place, and time. She appears well-developed and well-nourished.   HENT:   Mouth/Throat: Mallampati Score: IV.   Cardiovascular: Normal rate and regular rhythm.   No murmur heard.  Pulmonary/Chest: Normal expansion and effort normal. She has wheezes. She has rhonchi.   Musculoskeletal:         General: Edema present.   Neurological: She is alert and oriented to person, place, and time.   Skin: Skin is warm and dry.   Psychiatric: She has a normal mood and affect. Her behavior is normal.   Personal Diagnostic Review    No flowsheet data found.      CT Chest Abdomen Pelvis With Contrast  Narrative: EXAMINATION:  CT CHEST ABDOMEN PELVIS WITH CONTRAST (XPD)    CLINICAL HISTORY:  Colon cancer, non-metastatic, staging; Malignant neoplasm of hepatic flexure    TECHNIQUE:  Low dose axial images, sagittal and coronal reformations were obtained from the thoracic inlet to the pubic symphysis following the IV administration of 75 mL of Omnipaque 350.  450 mL of oral barium sulfate was administered.    COMPARISON:  MRI abdomen 12/19/2020, CT chest 12/08/2022, CT abdomen pelvis 10/25/2022    FINDINGS:  Base of Neck: No significant abnormality.    Thoracic soft tissues: Bilateral breast calcifications, similar prior.  No axillary lymphadenopathy.    Aorta: 3 vessel left-sided aortic arch with advanced calcific atherosclerosis including at the origin of the left common carotid and left subclavian arteries.  No aneurysm.  " Aortic annular calcifications are noted.    Heart: Mitral calcifications.  Moderate bilateral calcification of coronary arteries.  Normal size.  No pericardial effusion.    Pulmonary vasculature: Pulmonary arteries distribute normally.  There are four pulmonary veins.    Geovanna/Mediastinum: No pathologic ford enlargement.    Airways: Scattered areas of bronchiolar opacification likely reflecting mucoid impaction.    Lungs/Pleura: Improvement of the previously seen 1.4 cm nodule in the right lower lobe.  Additional innumerable bilateral ground-glass and solid pulmonary nodules which are grossly similar when compared to prior.  For example the 0.8 cm nodule in the lateral segment right lower lobe (series 6, image 376.  Or for example the 0.6 cm pulmonary nodule in the posterior segment right lower lobe (series 6, image 374).  No new large pulmonary nodules.  Scattered bands of subsegmental atelectasis, improved from prior.    Esophagus: Mildly patulous esophagus.  Small hiatal hernia.    Liver: Normal size.  Several subcentimeter hepatic hypodensities which are too small to characterize and are stable from prior.    Gallbladder: Surgically absent.    Bile Ducts: Mild intrahepatic biliary ductal dilatation, improved from the prior CT 10/25/2022.    Pancreas: Atrophic pancreatic parenchyma with scattered calcifications.  Dilatation of the pancreatic duct measuring up to 1.0 cm.  Additional cystic lesions suspected within the pancreatic parenchyma for example the 2.1 cm cyst the level the pancreatic head (series 3, image 45).  These are similar compared to prior.    Spleen: Few subcentimeter hypodensities, undetermined significance and prior.    Adrenals: Normal.    Kidneys/Ureters: Normal enhancement.  Normal size and location.  Few subcentimeter cortical hypodensities bilaterally which are too small to adequately characterize.  2 mm calcification inferior pole of the left kidney likely a nonobstructive nephrolith.  No  mass or  hydroureteronephrosis.    Bladder: Distended without evidence of wall thickening.    Reproductive organs: Surgically absent.    GI Tract/Mesentery: Postoperative changes of right hemicolectomy with gaseous distension of the remainder of the transverse/left colon and rectosigmoid colon.  No evidence of obstruction.  No evidence of colonic masses.    Peritoneal Space: No ascites or free air.    Retroperitoneum: No significant adenopathy.    Abdominal wall: Minimal body wall edema.  Surgical changes in the inferior right ventral abdominal wall.    Vasculature: Dense aortoiliac calcific atherosclerosis.  No aneurysm.    Bones: Osteopenic bones.  No acute fracture.  No osseous destructive lesions.  Impression: In this patient with colon cancer there are postoperative changes of right hemicolectomy without evidence of local recurrent disease.    Improvement of the previously seen 1.4 cm nodule in the right lower lobe.  Additional stable innumerable pulmonary nodules measuring up to 0.8 cm without evidence of new nodules.    Subcentimeter hepatic hypodensities too small to characterize and similar to prior.    Atrophic pancreas with dilatation the pancreatic duct and the cystic pancreatic lesions, unchanged from prior and better evaluated on prior MRI.    Additional findings as above.    Electronically signed by resident: Lucie Alfonso  Date:    05/01/2023  Time:    10:37    Electronically signed by: Alfonso Bruce MD  Date:    05/01/2023  Time:    11:30      Assessment:     Orders Placed This Encounter   Procedures    AFB Culture & Smear     Standing Status:   Future     Standing Expiration Date:   9/10/2024    Culture, Respiratory with Gram Stain     Standing Status:   Future     Standing Expiration Date:   9/10/2024    Fungus culture     Standing Status:   Future     Number of Occurrences:   1     Standing Expiration Date:   9/10/2024    CBC auto differential     Standing Status:   Future     Number of  Occurrences:   1     Standing Expiration Date:   9/10/2024    IGE     Standing Status:   Future     Number of Occurrences:   1     Standing Expiration Date:   9/10/2024    ALLERGEN ASPERGILLUS FUMAGATUS     Standing Status:   Future     Number of Occurrences:   1     Standing Expiration Date:   9/10/2024    STRONGYLOIDES IGG ANTIBODIES     Standing Status:   Future     Number of Occurrences:   1     Standing Expiration Date:   9/10/2024     1. Asthma, unspecified asthma severity, unspecified whether complicated, unspecified whether persistent    2. Carcinoid bronchial adenoma, right    3. Moderate persistent asthma without complication        Plan:         Problem List Items Addressed This Visit          Pulmonary    Asthma, moderate persistent    Overview     Followed by Dr. Peguero. Home medications include Advair and montelukast and albuterol.         Current Assessment & Plan     Uncontrolled on ICS-LABA.  Escalating to Trelegy 1 inhalation daily.  Patient instructed to rinse/gargle after each use.  Full explanation of inhaler technique using demo inhaler performed during the visit.  Use acapella valve to facilitate expectoration bid.  Check sputum cultures and labs.  CBC, IgE, aspergillus, strongy.  Patient may be a candidate for biologic therapy.           Relevant Orders    CBC auto differential (Completed)    IGE (Completed)    ALLERGEN ASPERGILLUS FUMAGATUS    STRONGYLOIDES IGG ANTIBODIES    AFB Culture & Smear    Culture, Respiratory with Gram Stain    Fungus culture    RESOLVED: Asthma - Primary    Relevant Orders    CBC auto differential (Completed)    IGE (Completed)    ALLERGEN ASPERGILLUS FUMAGATUS    STRONGYLOIDES IGG ANTIBODIES    AFB Culture & Smear    Culture, Respiratory with Gram Stain    Fungus culture       Oncology    Carcinoid bronchial adenoma, right    Overview     Incidentally noted on imaging Undergoing surveillance 12/2022 and biopsy proven 01/2023. On imaging review appears indolent.  Plan for surveillance

## 2023-07-13 NOTE — PATIENT INSTRUCTIONS
Stop Advair  Start Trelegy 1 inhalation daily.  Continue the singulair.  Start using the acapella/flutter valve therapy twice a day

## 2023-07-14 DIAGNOSIS — J82.83 EOSINOPHILIC ASTHMA: Primary | ICD-10-CM

## 2023-07-14 PROBLEM — J45.909 ASTHMA: Status: RESOLVED | Noted: 2023-07-14 | Resolved: 2023-07-14

## 2023-07-14 PROBLEM — J15.1 PNEUMONIA OF LEFT LOWER LOBE DUE TO PSEUDOMONAS SPECIES: Status: RESOLVED | Noted: 2022-07-10 | Resolved: 2023-07-14

## 2023-07-14 PROBLEM — J45.40 ASTHMA, MODERATE PERSISTENT: Status: RESOLVED | Noted: 2022-10-26 | Resolved: 2023-07-14

## 2023-07-14 PROBLEM — J45.909 ASTHMA: Status: ACTIVE | Noted: 2023-07-14

## 2023-07-14 RX ORDER — BENRALIZUMAB 30 MG/ML
30 INJECTION, SOLUTION SUBCUTANEOUS SEE ADMIN INSTRUCTIONS
Qty: 1 ML | Refills: 11 | Status: ACTIVE | OUTPATIENT
Start: 2023-07-14 | End: 2023-11-10 | Stop reason: ALTCHOICE

## 2023-07-14 RX ORDER — BENRALIZUMAB 30 MG/ML
30 INJECTION, SOLUTION SUBCUTANEOUS SEE ADMIN INSTRUCTIONS
Qty: 1 ML | Refills: 2 | Status: ACTIVE | OUTPATIENT
Start: 2023-07-14 | End: 2023-11-10 | Stop reason: ALTCHOICE

## 2023-07-15 NOTE — ASSESSMENT & PLAN NOTE
Uncontrolled on ICS-LABA.  Escalating to Trelegy 1 inhalation daily.  Patient instructed to rinse/gargle after each use.  Full explanation of inhaler technique using demo inhaler performed during the visit.  Use acapella valve to facilitate expectoration bid.  Check sputum cultures and labs.  CBC, IgE, aspergillus, strongy.  Patient may be a candidate for biologic therapy.

## 2023-07-19 ENCOUNTER — TELEPHONE (OUTPATIENT)
Dept: PHARMACY | Facility: CLINIC | Age: 88
End: 2023-07-19
Payer: MEDICARE

## 2023-07-19 NOTE — TELEPHONE ENCOUNTER
Hello, this is Peace Pacheco, clinical pharmacist with [facility] that is part of your care team.  We have begun working on your prescription that your doctor has sent us. Our next steps include:     Working with your insurance company to obtain approval for your medication  Working with you to ensure your medication is affordable     We will be calling you along the way with updates on your medication but if you have any concerns or receive information that you would like to discuss please reach us at [phone].    Welcome call outcome: Left voicemail

## 2023-07-24 ENCOUNTER — SPECIALTY PHARMACY (OUTPATIENT)
Dept: PHARMACY | Facility: CLINIC | Age: 88
End: 2023-07-24
Payer: MEDICARE

## 2023-07-25 NOTE — TELEPHONE ENCOUNTER
Outgoing call to pt to inform of copay amount.  Due to patient having [employer] employee insurance and Medicare A/B patient does not qualify for copay card or PAP. Buy and bill options may be best route. LVM

## 2023-07-25 NOTE — TELEPHONE ENCOUNTER
Staff message sent to MD/MDO to inform of pt's decision and possibly process under Buy & Bill, if pt wishes to proceed with therapy.      Closing out referral enrollment at [facility].

## 2023-07-25 NOTE — TELEPHONE ENCOUNTER
Incoming call from pt returning call to [facility]. Informed pt of previous note. Pt stated that she does not think she wants to continue with therapy. Stated she has a lot of meds already and is not sure she wants to add on another one. Routing assigned [facility] to follow up with [facility].

## 2023-08-04 ENCOUNTER — PATIENT OUTREACH (OUTPATIENT)
Dept: ADMINISTRATIVE | Facility: HOSPITAL | Age: 88
End: 2023-08-04
Payer: MEDICARE

## 2023-08-04 NOTE — LETTER
AUTHORIZATION FOR RELEASE OF   CONFIDENTIAL INFORMATION    Dear Dr. Martines,    We are seeing Sussy Costa, date of birth 1935, in the clinic at [facility]. PAULA Casillas MD is the patient's PCP. Sussy Costa has an outstanding lab/procedure at the time we reviewed her chart. In order to help keep her health information updated, she has authorized us to request the following medical record(s):        (  )  MAMMOGRAM                                      (  )  COLONOSCOPY      (  )  PAP SMEAR                                          (  )  OUTSIDE LAB RESULTS     (  )  DEXA SCAN                                          ( x )  EYE EXAM            (  )  FOOT EXAM                                          (  )  ENTIRE RECORD     (  )  OUTSIDE IMMUNIZATIONS                 (  )  _______________         Please fax records to PAULA Casillas MD, [phone]     If you have any questions, please contact TED Morton at [phone].           Patient Name: Sussy Costa  : 1935  Patient Phone #: [phone]

## 2023-08-04 NOTE — PROGRESS NOTES
Chart review done.  updated. Immunizations reviewed & updated. Care Everywhere updated.  Outside eye exam requested.

## 2023-08-08 ENCOUNTER — LAB VISIT (OUTPATIENT)
Dept: LAB | Facility: HOSPITAL | Age: 88
End: 2023-08-08
Attending: INTERNAL MEDICINE
Payer: MEDICARE

## 2023-08-08 ENCOUNTER — PATIENT OUTREACH (OUTPATIENT)
Dept: ADMINISTRATIVE | Facility: HOSPITAL | Age: 88
End: 2023-08-08
Payer: MEDICARE

## 2023-08-08 DIAGNOSIS — J45.40 MODERATE PERSISTENT ASTHMA WITHOUT COMPLICATION: ICD-10-CM

## 2023-08-08 PROCEDURE — 87118 MYCOBACTERIC IDENTIFICATION: CPT

## 2023-08-08 PROCEDURE — 87206 SMEAR FLUORESCENT/ACID STAI: CPT | Performed by: INTERNAL MEDICINE

## 2023-08-08 PROCEDURE — 87015 SPECIMEN INFECT AGNT CONCNTJ: CPT | Performed by: INTERNAL MEDICINE

## 2023-08-08 PROCEDURE — 87116 MYCOBACTERIA CULTURE: CPT | Performed by: INTERNAL MEDICINE

## 2023-08-08 PROCEDURE — 87186 SC STD MICRODIL/AGAR DIL: CPT

## 2023-08-08 PROCEDURE — 87118 MYCOBACTERIC IDENTIFICATION: CPT | Performed by: INTERNAL MEDICINE

## 2023-08-08 PROCEDURE — 87150 DNA/RNA AMPLIFIED PROBE: CPT

## 2023-08-10 ENCOUNTER — OFFICE VISIT (OUTPATIENT)
Dept: PODIATRY | Facility: CLINIC | Age: 88
End: 2023-08-10
Payer: MEDICARE

## 2023-08-10 VITALS
BODY MASS INDEX: 17.63 KG/M2 | WEIGHT: 105.81 LBS | DIASTOLIC BLOOD PRESSURE: 65 MMHG | HEIGHT: 65 IN | HEART RATE: 68 BPM | SYSTOLIC BLOOD PRESSURE: 157 MMHG

## 2023-08-10 DIAGNOSIS — S90.129A CONTUSION OF FIFTH TOE: ICD-10-CM

## 2023-08-10 DIAGNOSIS — B35.1 ONYCHOMYCOSIS DUE TO DERMATOPHYTE: ICD-10-CM

## 2023-08-10 DIAGNOSIS — E11.49 TYPE II DIABETES MELLITUS WITH NEUROLOGICAL MANIFESTATIONS: Primary | ICD-10-CM

## 2023-08-10 DIAGNOSIS — L84 CORN OR CALLUS: ICD-10-CM

## 2023-08-10 PROCEDURE — 11721 DEBRIDE NAIL 6 OR MORE: CPT | Mod: 59,Q9,S$PBB, | Performed by: PODIATRIST

## 2023-08-10 PROCEDURE — 11056 PARNG/CUTG B9 HYPRKR LES 2-4: CPT | Mod: Q9,PBBFAC | Performed by: PODIATRIST

## 2023-08-10 PROCEDURE — 11721 DEBRIDE NAIL 6 OR MORE: CPT | Mod: 59,Q9,PBBFAC | Performed by: PODIATRIST

## 2023-08-10 PROCEDURE — 99213 PR OFFICE/OUTPT VISIT, EST, LEVL III, 20-29 MIN: ICD-10-PCS | Mod: 25,S$PBB,, | Performed by: PODIATRIST

## 2023-08-10 PROCEDURE — 11056 PARNG/CUTG B9 HYPRKR LES 2-4: CPT | Mod: Q9,S$PBB,, | Performed by: PODIATRIST

## 2023-08-10 PROCEDURE — 99999 PR PBB SHADOW E&M-EST. PATIENT-LVL IV: ICD-10-PCS | Mod: PBBFAC,,, | Performed by: PODIATRIST

## 2023-08-10 PROCEDURE — 99213 OFFICE O/P EST LOW 20 MIN: CPT | Mod: 25,S$PBB,, | Performed by: PODIATRIST

## 2023-08-10 PROCEDURE — 99999 PR PBB SHADOW E&M-EST. PATIENT-LVL IV: CPT | Mod: PBBFAC,,, | Performed by: PODIATRIST

## 2023-08-10 PROCEDURE — 11056 PR TRIM BENIGN HYPERKERATOTIC SKIN LESION,2-4: ICD-10-PCS | Mod: Q9,S$PBB,, | Performed by: PODIATRIST

## 2023-08-10 PROCEDURE — 11721 PR DEBRIDEMENT OF NAILS, 6 OR MORE: ICD-10-PCS | Mod: 59,Q9,S$PBB, | Performed by: PODIATRIST

## 2023-08-10 PROCEDURE — 99214 OFFICE O/P EST MOD 30 MIN: CPT | Mod: PBBFAC | Performed by: PODIATRIST

## 2023-08-10 RX ORDER — DICLOFENAC SODIUM 10 MG/G
2 GEL TOPICAL 4 TIMES DAILY
Qty: 100 G | Refills: 2 | Status: ON HOLD | OUTPATIENT
Start: 2023-08-10 | End: 2024-04-02 | Stop reason: HOSPADM

## 2023-08-14 ENCOUNTER — INFUSION (OUTPATIENT)
Dept: INFECTIOUS DISEASES | Facility: HOSPITAL | Age: 88
End: 2023-08-14
Attending: INTERNAL MEDICINE
Payer: MEDICARE

## 2023-08-14 VITALS
TEMPERATURE: 98 F | DIASTOLIC BLOOD PRESSURE: 72 MMHG | RESPIRATION RATE: 18 BRPM | HEART RATE: 69 BPM | SYSTOLIC BLOOD PRESSURE: 169 MMHG | OXYGEN SATURATION: 98 % | HEIGHT: 65 IN | BODY MASS INDEX: 17.32 KG/M2 | WEIGHT: 103.94 LBS

## 2023-08-14 DIAGNOSIS — M81.0 SENILE OSTEOPOROSIS: Primary | ICD-10-CM

## 2023-08-14 PROCEDURE — 63600175 PHARM REV CODE 636 W HCPCS: Mod: JZ,JG | Performed by: INTERNAL MEDICINE

## 2023-08-14 PROCEDURE — 96372 THER/PROPH/DIAG INJ SC/IM: CPT

## 2023-08-14 RX ADMIN — DENOSUMAB 60 MG: 60 INJECTION SUBCUTANEOUS at 11:08

## 2023-08-14 NOTE — PROGRESS NOTES
Subjective:      Patient ID: Sussy Costa is a 87 y.o. female.    Chief Complaint:     Patient presents for routine diabetic foot care today.  She is doing well.  She is in need of diabetic education as well.  She has painful toes bilateral.  Addition she has arthritis type pain bilateral.    Review of Systems   Constitutional: Negative for chills, decreased appetite, fever, malaise/fatigue and night sweats.   HENT:  Negative for congestion, ear discharge, hearing loss, nosebleeds and tinnitus.    Eyes:  Negative for double vision, pain and visual disturbance.   Cardiovascular:  Negative for chest pain, claudication, cyanosis and palpitations.   Respiratory:  Negative for cough, hemoptysis, shortness of breath and wheezing.    Endocrine: Negative for cold intolerance and heat intolerance.   Hematologic/Lymphatic: Negative for adenopathy and bleeding problem.   Skin:  Positive for color change, dry skin, nail changes and unusual hair distribution. Negative for flushing and rash.   Musculoskeletal:  Positive for arthritis and stiffness. Negative for joint pain and myalgias.   Gastrointestinal:  Negative for abdominal pain, dysphagia, nausea and vomiting.   Genitourinary:  Negative for dysuria, flank pain, hematuria and pelvic pain.   Neurological:  Positive for disturbances in coordination, paresthesias and sensory change. Negative for loss of balance and numbness.   Psychiatric/Behavioral:  Negative for altered mental status, hallucinations and suicidal ideas. The patient does not have insomnia.    Allergic/Immunologic: Negative for environmental allergies and persistent infections.           Objective:      Physical Exam  Vitals reviewed.   Constitutional:       Appearance: She is well-developed.   HENT:      Head: Normocephalic and atraumatic.   Cardiovascular:      Pulses:           Dorsalis pedis pulses are 2+ on the right side and 2+ on the left side.        Posterior tibial pulses are 2+ on the right side  and 2+ on the left side.      Comments: No edema noted to b/L LEs  Pulmonary:      Effort: Pulmonary effort is normal.   Musculoskeletal:         General: Normal range of motion.      Comments: Muscle strength is 5/5 in all groups bilaterally.  Non reducible equinus noted to left lower extremity  POP to insertion of the AT, left    Positive Tinel sign/provocation sign bilateral tibial nerve.   Feet:      Right foot:      Protective Sensation: 5 sites tested.  5 sites sensed.      Skin integrity: Callus and dry skin present.      Left foot:      Protective Sensation: 5 sites tested.  5 sites sensed.      Skin integrity: Callus and dry skin present.   Skin:     General: Skin is warm and dry.      Capillary Refill: Capillary refill takes 2 to 3 seconds.      Coloration: Skin is pale.      Comments: Long, thickened, discolored  toenails 1-5 with subungual debris.  Skin dry thin and atrophic.   Neurological:      Mental Status: She is alert and oriented to person, place, and time.      Sensory: Sensory deficit present.      Motor: Atrophy present.      Deep Tendon Reflexes: Reflexes abnormal.      Reflex Scores:       Patellar reflexes are 1+ on the right side and 1+ on the left side.       Achilles reflexes are 1+ on the right side and 1+ on the left side.     Comments: Intact gross sensation noted to b/L LEs   Psychiatric:         Behavior: Behavior normal.               Assessment:       Encounter Diagnoses   Name Primary?    Type II diabetes mellitus with neurological manifestations Yes    Onychomycosis due to dermatophyte     Corn or callus     Contusion of fifth toe - Right Foot            Plan:       Sussy was seen today for diabetic foot exam and routine foot care.    Diagnoses and all orders for this visit:    Type II diabetes mellitus with neurological manifestations    Onychomycosis due to dermatophyte    Corn or callus    Contusion of fifth toe - Right Foot    Other orders  -     diclofenac sodium  (VOLTAREN) 1 % Gel; Apply 2 g topically 4 (four) times daily.        I counseled the patient on her conditions, their implications and medical management.    Decision making:  Chronic illnesses discussed in detail, previous records/notes and imaging independently reviewed, prescription drug management performed in addition to lengthy discussion regarding both conservative and surgical treatment options.    Discussed options for peripheral neuropathy/nerve entrapment syndrome including nerve block therapy, surgical nerve entrapment decompression procedures, and various vitamins and supplementation available shown to improve nerve function.    Routine Foot Care    Performed by:  Alen Campa. DPM  Authorized by:  Patient     Consent Done?:  Yes (Verbal)     Nail Care Type:  Debride  Location(s): All  (Left 1st Toe, Left 3rd Toe, Left 2nd Toe, Left 4th Toe, Left 5th Toe, Right 1st Toe, Right 2nd Toe, Right 3rd Toe, Right 4th Toe and Right 5th Toe)  Patient tolerance:  Patient tolerated the procedure well with no immediate complications     With patient's permission, the toenails mentioned above were aggressively reduced and debrided using a nail nipper, removing all offending nail and debris. The patient will continue to monitor the areas daily, inspect the feet, wear protective shoe gear when ambulatory, and moisturizer to maintain skin integrity.      Callus Care Type: Debride    With patient's permission, the calluses/hyperkeratotic lesions mentioned above were aggressively reduced and debrided using a number 15 blade. The patient will continue to monitor the areas daily, inspect the feet, wear protective shoe gear when ambulatory, and moisturizer to maintain skin integrity.       Shoe inspection. Diabetic Foot Education. Patient reminded of the importance of good nutrition and blood sugar control to help prevent podiatric complications of diabetes. Patient instructed on proper foot hygeine. We discussed wearing  proper shoe gear, daily foot inspections and Diabetic foot education in detail.    Return to clinic in 3-6 months or sooner if problems arise         .

## 2023-08-14 NOTE — PROGRESS NOTES
Limited head-to-toe assessment due to privacy issues and visit reason though the opportunity was given for patient to express any concerns    Pt arrived for Prolia, confirms taking Vit D supplement and denies dental sx <3 months. Next appointment scheduled.

## 2023-08-15 ENCOUNTER — OFFICE VISIT (OUTPATIENT)
Dept: OTOLARYNGOLOGY | Facility: CLINIC | Age: 88
End: 2023-08-15
Payer: MEDICARE

## 2023-08-15 VITALS
SYSTOLIC BLOOD PRESSURE: 115 MMHG | DIASTOLIC BLOOD PRESSURE: 61 MMHG | WEIGHT: 105.19 LBS | BODY MASS INDEX: 17.5 KG/M2 | HEART RATE: 67 BPM

## 2023-08-15 DIAGNOSIS — J30.89 NON-SEASONAL ALLERGIC RHINITIS, UNSPECIFIED TRIGGER: ICD-10-CM

## 2023-08-15 DIAGNOSIS — J32.8 OTHER CHRONIC SINUSITIS: Primary | ICD-10-CM

## 2023-08-15 DIAGNOSIS — J33.8 POLYP OF PARANASAL SINUS: ICD-10-CM

## 2023-08-15 PROCEDURE — 31231 PR NASAL ENDOSCOPY, DX: ICD-10-PCS | Mod: S$PBB,,, | Performed by: OTOLARYNGOLOGY

## 2023-08-15 PROCEDURE — 99214 OFFICE O/P EST MOD 30 MIN: CPT | Mod: 25,S$PBB,, | Performed by: OTOLARYNGOLOGY

## 2023-08-15 PROCEDURE — 99214 OFFICE O/P EST MOD 30 MIN: CPT | Mod: PBBFAC,PN | Performed by: OTOLARYNGOLOGY

## 2023-08-15 PROCEDURE — 31231 NASAL ENDOSCOPY DX: CPT | Mod: PBBFAC,PN | Performed by: OTOLARYNGOLOGY

## 2023-08-15 PROCEDURE — 99214 PR OFFICE/OUTPT VISIT, EST, LEVL IV, 30-39 MIN: ICD-10-PCS | Mod: 25,S$PBB,, | Performed by: OTOLARYNGOLOGY

## 2023-08-15 PROCEDURE — 31231 NASAL ENDOSCOPY DX: CPT | Mod: S$PBB,,, | Performed by: OTOLARYNGOLOGY

## 2023-08-15 PROCEDURE — 99999 PR PBB SHADOW E&M-EST. PATIENT-LVL IV: ICD-10-PCS | Mod: PBBFAC,,, | Performed by: OTOLARYNGOLOGY

## 2023-08-15 PROCEDURE — 99999 PR PBB SHADOW E&M-EST. PATIENT-LVL IV: CPT | Mod: PBBFAC,,, | Performed by: OTOLARYNGOLOGY

## 2023-08-15 RX ORDER — MONTELUKAST SODIUM 10 MG/1
10 TABLET ORAL NIGHTLY
Qty: 30 TABLET | Refills: 6 | Status: SHIPPED | OUTPATIENT
Start: 2023-08-15 | End: 2024-02-10 | Stop reason: SDUPTHER

## 2023-08-15 RX ORDER — LEVOCETIRIZINE DIHYDROCHLORIDE 5 MG/1
5 TABLET, FILM COATED ORAL NIGHTLY
Qty: 30 TABLET | Refills: 11 | Status: SHIPPED | OUTPATIENT
Start: 2023-08-15 | End: 2024-02-10 | Stop reason: SDUPTHER

## 2023-08-15 NOTE — PROGRESS NOTES
CC: follow up CRS    Subjective:      Sussy Costa is a 87 y.o. female who comes for follow-up for chronic rhinosinusitis with nasal polyposis(AERD) status-post revision endoscopic sinus surgery remotely. She reports she has been doing well regarding her sinus symptoms but has recently noticed that she is sneezing more frequently. She denies facial pain/pressure. She does have some crusting with nasal rinses. She  is using xyzal, montelukast, and budesonide/mupirocin nasal saline rinses.     Objective:     /61 (BP Location: Left arm, Patient Position: Sitting)   Pulse 67   Wt 47.7 kg (105 lb 2.6 oz)   BMI 17.50 kg/m²      Constitutional: Well appearing / communicating.  NAD.  Eyes: EOM I Bilaterally  Head/Face: Normocephalic.  Negative paranasal sinus pressure/tenderness.  Salivary glands WNL.  House Brackmann I Bilaterally.    Right Ear: External Auditory Canal WNL,TM w/o masses/lesions/perforations.  Auricle WNL.  Left Ear: External Auditory Canal WNL,TM w/o masses/lesions/perforations. Auricle WNL.  Nose: No gross nasal septal deviation. Inferior Turbinates 3+ bilaterally. No septal perforation. No masses/lesions. External nasal skin without masses/lesions.  Oral Cavity: Gingiva/lips WNL.  FOM Soft, no masses palpated. Oral Tongue mobile. Hard Palate WNL.   Oropharynx: BOT WNL. No masses/lesions noted. Tonsillar fossa/pharyngeal wall without lesions. Posterior oropharynx WNL.  Soft palate without masses. Midline uvula.   Neck/Lymphatic: No LAD I-VI bilaterally.  No thyromegaly.  No masses noted on exam.      Procedure    Nasal endoscopy performed.  See procedure note.        Data Reviewed    Pathology report indicated chronic inflammation with eosinophilia.       Assessment:     1. Other chronic sinusitis    2. Non-seasonal allergic rhinitis, unspecified trigger    3. Polyp of paranasal sinus             Plan:       Continue budesonide and mupirocin nasal nebulized solution and nasal saline  rinses(refills provided).   Continue Singulair and Xyzal.       Follow up in about 6 months (around 2/15/2024).     Rosangela Isabel MD

## 2023-08-15 NOTE — PROCEDURES
Procedures     PROCEDURE NOTE:  Nasal endoscopy   Preprocedure diagnosis:  Chronic sinusitis with polyposis  Postprocedure diangosis:  Same  Complications:  None  Blood Loss:  None    Procedure in detail:  After verbal consent was obtained, the patient's nasal cavity was anesthesized using topical 1%lidocaine and Neosynepherine.  A rigid 0 degree endoscope was placed in first the right, then the left nasal cavity.  The inferior and middle turbinates were examined, and found to be boggy with edema bilaterally.   The middle meatus and maxillary antrum was also examined, and found to be patent bilaterally. Sphenoidotomy patent bilaterally. No evidence of brittany polyps.  No purulent drainage or masses seen.    The patient tolerated the procedure well and there were no complications.

## 2023-08-22 ENCOUNTER — LAB VISIT (OUTPATIENT)
Dept: LAB | Facility: HOSPITAL | Age: 88
End: 2023-08-22
Payer: MEDICARE

## 2023-08-22 DIAGNOSIS — R05.9 COUGH, UNSPECIFIED TYPE: Primary | ICD-10-CM

## 2023-08-22 DIAGNOSIS — R05.9 COUGH, UNSPECIFIED TYPE: ICD-10-CM

## 2023-08-22 PROCEDURE — 87077 CULTURE AEROBIC IDENTIFY: CPT | Performed by: INTERNAL MEDICINE

## 2023-08-22 PROCEDURE — 87205 SMEAR GRAM STAIN: CPT | Performed by: INTERNAL MEDICINE

## 2023-08-22 PROCEDURE — 87186 SC STD MICRODIL/AGAR DIL: CPT | Performed by: INTERNAL MEDICINE

## 2023-08-22 PROCEDURE — 87070 CULTURE OTHR SPECIMN AEROBIC: CPT | Performed by: INTERNAL MEDICINE

## 2023-08-24 LAB
BACTERIA SPEC AEROBE CULT: ABNORMAL
GRAM STN SPEC: ABNORMAL

## 2023-08-28 RX ORDER — ATORVASTATIN CALCIUM 20 MG/1
20 TABLET, FILM COATED ORAL NIGHTLY
Qty: 90 TABLET | Refills: 0 | Status: ON HOLD | OUTPATIENT
Start: 2023-08-28 | End: 2023-11-20 | Stop reason: SDUPTHER

## 2023-08-29 DIAGNOSIS — A31.0 MAI (MYCOBACTERIUM AVIUM-INTRACELLULARE): Primary | ICD-10-CM

## 2023-08-30 ENCOUNTER — TELEPHONE (OUTPATIENT)
Dept: PULMONOLOGY | Facility: CLINIC | Age: 88
End: 2023-08-30
Payer: MEDICARE

## 2023-08-30 NOTE — TELEPHONE ENCOUNTER
----- Message from Simona Bruno sent at 8/30/2023  4:26 PM CDT -----  Regarding: missed c/b  Contact: Pt @[phone]  Pt is returning a missed call from someone in the office and is asking for a return call back soon. Thanks.         Reason for call: missed call         Patient's DX: n/a         Patient requesting call back or [facility] msg:

## 2023-08-30 NOTE — TELEPHONE ENCOUNTER
I spoke with Ms Costa in regards to her sputum culture. Patient will go to a [facility] lab soon. Patient verbalized understanding.

## 2023-08-30 NOTE — TELEPHONE ENCOUNTER
----- Message from Nia Posada MD sent at 8/29/2023  4:11 PM CDT -----  SP: please call Ms. Costa and let her know that we need one more sputum culture to be submitted. I've placed the order.

## 2023-09-01 ENCOUNTER — OFFICE VISIT (OUTPATIENT)
Dept: INTERNAL MEDICINE | Facility: CLINIC | Age: 88
End: 2023-09-01
Payer: MEDICARE

## 2023-09-01 VITALS
OXYGEN SATURATION: 96 % | RESPIRATION RATE: 18 BRPM | BODY MASS INDEX: 17.85 KG/M2 | TEMPERATURE: 97 F | SYSTOLIC BLOOD PRESSURE: 112 MMHG | HEIGHT: 65 IN | WEIGHT: 107.13 LBS | HEART RATE: 64 BPM | DIASTOLIC BLOOD PRESSURE: 64 MMHG

## 2023-09-01 DIAGNOSIS — Z79.4 DIABETES MELLITUS WITH INSULIN THERAPY: ICD-10-CM

## 2023-09-01 DIAGNOSIS — I48.91 ATRIAL FIBRILLATION, UNSPECIFIED TYPE: ICD-10-CM

## 2023-09-01 DIAGNOSIS — I10 PRIMARY HYPERTENSION: Primary | ICD-10-CM

## 2023-09-01 DIAGNOSIS — E11.9 DIABETES MELLITUS WITH INSULIN THERAPY: ICD-10-CM

## 2023-09-01 DIAGNOSIS — J45.40 MODERATE PERSISTENT ASTHMA WITHOUT COMPLICATION: ICD-10-CM

## 2023-09-01 PROCEDURE — 99213 OFFICE O/P EST LOW 20 MIN: CPT | Mod: PBBFAC,PO | Performed by: INTERNAL MEDICINE

## 2023-09-01 PROCEDURE — 99214 PR OFFICE/OUTPT VISIT, EST, LEVL IV, 30-39 MIN: ICD-10-PCS | Mod: S$PBB,,, | Performed by: INTERNAL MEDICINE

## 2023-09-01 PROCEDURE — 99999 PR PBB SHADOW E&M-EST. PATIENT-LVL III: ICD-10-PCS | Mod: PBBFAC,,, | Performed by: INTERNAL MEDICINE

## 2023-09-01 PROCEDURE — 99999 PR PBB SHADOW E&M-EST. PATIENT-LVL III: CPT | Mod: PBBFAC,,, | Performed by: INTERNAL MEDICINE

## 2023-09-01 PROCEDURE — 99214 OFFICE O/P EST MOD 30 MIN: CPT | Mod: S$PBB,,, | Performed by: INTERNAL MEDICINE

## 2023-09-01 NOTE — PROGRESS NOTES
History of present illness:   [age] year lady in for a follow-up visit.  The patient has hypertension, dyslipidemia, history of atrial fibrillation, osteoporosis, moderate persistent asthma and others.  She is followed by other subspecialties including pulmonology, Endocrinology.  She reports that currently she is doing well.  Taking her medications as directed.  No new particular concerns problems or complaints .      Current medications:  Medications are all noted and reviewed.      Review of systems:  Constitutional:  No fever no chills no generalized body aches.    HEENT:  No hoarseness or dysphagia.  Respiratory:  No wheezing or or overt shortness of breath.  Chronic at slight cough with phlegm.    Cardiovascular: Denies chest pain palpitations syncope presyncope.  No edema and no claudication.    GI:  No nausea no vomiting abdominal pain or diarrhea.    Physical examination:  General:  Pleasant alert appropriately groomed lady no acute distress.    Vital signs:  Blood pressure 112/64.  Other vital signs also normal.    HEENT:  Normocephalic.  Neck supple no masses no thyromegaly  Lungs:  Clear to auscultation.  No wheezing.  Cardiovascular:  Regular rate rhythm.  1/6 systolic murmur.  No JVD.  No peripheral extremity edema.    Mental status:  Alert oriented affect mood all appropriate.    Data:  Reviewed recent lab data and other studies.  She has lab data scheduled for later this month.          Impression:   [age] year lady with several stable chronic medical conditions.    Hypertension is well controlled.    Dyslipidemia on statin therapy.    Type 2 diabetes mellitus managed by Endocrinology.  Other chronic medical conditions as noted in the electronic medical record history sections.        Plan:   Continue current pharmacologic regimens.    Add TSH to schedule lab on September 19th.    Return to clinic six months

## 2023-09-11 ENCOUNTER — PATIENT MESSAGE (OUTPATIENT)
Dept: PULMONOLOGY | Facility: CLINIC | Age: 88
End: 2023-09-11
Payer: MEDICARE

## 2023-09-19 ENCOUNTER — LAB VISIT (OUTPATIENT)
Dept: LAB | Facility: HOSPITAL | Age: 88
End: 2023-09-19
Payer: MEDICARE

## 2023-09-19 DIAGNOSIS — R80.9 TYPE 2 DIABETES MELLITUS WITH MICROALBUMINURIA, WITHOUT LONG-TERM CURRENT USE OF INSULIN: ICD-10-CM

## 2023-09-19 DIAGNOSIS — E11.29 TYPE 2 DIABETES MELLITUS WITH MICROALBUMINURIA, WITHOUT LONG-TERM CURRENT USE OF INSULIN: ICD-10-CM

## 2023-09-19 DIAGNOSIS — I10 PRIMARY HYPERTENSION: ICD-10-CM

## 2023-09-19 LAB
ALBUMIN SERPL BCP-MCNC: 3.3 G/DL (ref 3.5–5.2)
ALP SERPL-CCNC: 52 U/L (ref 55–135)
ALT SERPL W/O P-5'-P-CCNC: 15 U/L (ref 10–44)
ANION GAP SERPL CALC-SCNC: 8 MMOL/L (ref 8–16)
AST SERPL-CCNC: 19 U/L (ref 10–40)
BILIRUB SERPL-MCNC: 0.5 MG/DL (ref 0.1–1)
BUN SERPL-MCNC: 37 MG/DL (ref 8–23)
CALCIUM SERPL-MCNC: 8.5 MG/DL (ref 8.7–10.5)
CHLORIDE SERPL-SCNC: 110 MMOL/L (ref 95–110)
CHOLEST SERPL-MCNC: 124 MG/DL (ref 120–199)
CHOLEST/HDLC SERPL: 2.3 {RATIO} (ref 2–5)
CO2 SERPL-SCNC: 23 MMOL/L (ref 23–29)
CREAT SERPL-MCNC: 1.2 MG/DL (ref 0.5–1.4)
EST. GFR  (NO RACE VARIABLE): 44.1 ML/MIN/1.73 M^2
ESTIMATED AVG GLUCOSE: 157 MG/DL (ref 68–131)
GLUCOSE SERPL-MCNC: 146 MG/DL (ref 70–110)
HBA1C MFR BLD: 7.1 % (ref 4–5.6)
HDLC SERPL-MCNC: 53 MG/DL (ref 40–75)
HDLC SERPL: 42.7 % (ref 20–50)
LDLC SERPL CALC-MCNC: 62.8 MG/DL (ref 63–159)
NONHDLC SERPL-MCNC: 71 MG/DL
POTASSIUM SERPL-SCNC: 4.2 MMOL/L (ref 3.5–5.1)
PROT SERPL-MCNC: 6.2 G/DL (ref 6–8.4)
SODIUM SERPL-SCNC: 141 MMOL/L (ref 136–145)
TRIGL SERPL-MCNC: 41 MG/DL (ref 30–150)
TSH SERPL DL<=0.005 MIU/L-ACNC: 3.74 UIU/ML (ref 0.4–4)

## 2023-09-19 PROCEDURE — 80053 COMPREHEN METABOLIC PANEL: CPT | Performed by: NURSE PRACTITIONER

## 2023-09-19 PROCEDURE — 36415 COLL VENOUS BLD VENIPUNCTURE: CPT | Performed by: NURSE PRACTITIONER

## 2023-09-19 PROCEDURE — 84443 ASSAY THYROID STIM HORMONE: CPT | Performed by: INTERNAL MEDICINE

## 2023-09-19 PROCEDURE — 80061 LIPID PANEL: CPT | Performed by: NURSE PRACTITIONER

## 2023-09-19 PROCEDURE — 83036 HEMOGLOBIN GLYCOSYLATED A1C: CPT | Performed by: NURSE PRACTITIONER

## 2023-09-22 ENCOUNTER — OFFICE VISIT (OUTPATIENT)
Dept: URGENT CARE | Facility: CLINIC | Age: 88
End: 2023-09-22
Payer: MEDICARE

## 2023-09-22 VITALS
WEIGHT: 107 LBS | SYSTOLIC BLOOD PRESSURE: 150 MMHG | RESPIRATION RATE: 19 BRPM | HEART RATE: 69 BPM | OXYGEN SATURATION: 98 % | DIASTOLIC BLOOD PRESSURE: 80 MMHG | TEMPERATURE: 98 F | BODY MASS INDEX: 17.83 KG/M2 | HEIGHT: 65 IN

## 2023-09-22 DIAGNOSIS — M62.838 MUSCLE SPASM: Primary | ICD-10-CM

## 2023-09-22 DIAGNOSIS — M54.30 SCIATICA, UNSPECIFIED LATERALITY: ICD-10-CM

## 2023-09-22 PROCEDURE — 99213 PR OFFICE/OUTPT VISIT, EST, LEVL III, 20-29 MIN: ICD-10-PCS | Mod: S$GLB,,, | Performed by: FAMILY MEDICINE

## 2023-09-22 PROCEDURE — 99213 OFFICE O/P EST LOW 20 MIN: CPT | Mod: S$GLB,,, | Performed by: FAMILY MEDICINE

## 2023-09-22 RX ORDER — GABAPENTIN 100 MG/1
100 CAPSULE ORAL 3 TIMES DAILY
Qty: 30 CAPSULE | Refills: 1 | Status: ON HOLD | OUTPATIENT
Start: 2023-09-22 | End: 2024-04-02 | Stop reason: HOSPADM

## 2023-09-22 RX ORDER — BACLOFEN 10 MG/1
10 TABLET ORAL EVERY 8 HOURS PRN
Qty: 30 TABLET | Refills: 1 | Status: SHIPPED | OUTPATIENT
Start: 2023-09-22 | End: 2023-10-17

## 2023-09-22 NOTE — PROGRESS NOTES
Subjective:      Patient ID: Sussy Costa is a 87 y.o. female.    Vitals:  vitals were not taken for this visit.     Chief Complaint: hurt back lifting bale of hay    Pt is complaining of back pain that started when picking up something to put in a trunk. She said it started Tuesday. She believes she pulled something. She is having pain when walking. She tried tylenol but no relief. The calf area feels somewhat funny feeling.     Back Pain  This is a new problem. The current episode started in the past 7 days. The problem occurs constantly. The problem is unchanged. The pain does not radiate. Associated symptoms include leg pain and weakness. Pertinent negatives include no abdominal pain, bladder incontinence, bowel incontinence, chest pain, dysuria, fever, headaches, numbness, paresis, paresthesias, pelvic pain, perianal numbness, tingling or weight loss.     Constitution: Negative for fever.   Cardiovascular:  Negative for chest pain.   Gastrointestinal:  Negative for abdominal pain and bowel incontinence.   Genitourinary:  Negative for dysuria, bladder incontinence and pelvic pain.   Musculoskeletal:  Positive for back pain.   Neurological:  Negative for headaches and numbness.    Objective:     Physical Exam   Constitutional: She is oriented to person, place, and time. She does not appear ill. She appears distressed.   HENT:   Head: Normocephalic and atraumatic.   Ears:   Right Ear: External ear normal.   Left Ear: External ear normal.   Eyes: Conjunctivae are normal. Pupils are equal, round, and reactive to light. Extraocular movement intact   Neck: Neck supple.   Pulmonary/Chest: Effort normal. No stridor. No respiratory distress.   Abdominal: Normal appearance.   Musculoskeletal:      Thoracic back: She exhibits decreased range of motion, tenderness and spasm.      Lumbar back: She exhibits decreased range of motion, tenderness and spasm.   Neurological: no focal deficit. She is alert, oriented to person,  place, and time and at baseline.   Skin: Skin is warm and dry. Capillary refill takes less than 2 seconds.   Psychiatric: Her behavior is normal. Mood and thought content normal.     Assessment:     Plan:   1. Muscle spasm  - baclofen (LIORESAL) 10 MG tablet; Take 1 tablet (10 mg total) by mouth every 8 (eight) hours as needed.  Dispense: 30 tablet; Refill: 1    2. Sciatica, unspecified laterality  - gabapentin (NEURONTIN) 100 MG capsule; Take 1 capsule (100 mg total) by mouth 3 (three) times daily.  Dispense: 30 capsule; Refill: 1

## 2023-09-22 NOTE — PROGRESS NOTES
Subjective:      Patient ID: Sussy Costa is a 87 y.o. female.    Chief Complaint:  No chief complaint on file.    History of Present Illness  Sussy Costa is here for follow up of DM and Osteoporosis.  Previously seen by me 5/2023.      With regards to diabetes:     Latest Reference Range & Units 03/08/22 10:13   Glucose 70 - 110 mg/dL 207 (H)   C-Peptide 0.78 - 5.19 ng/mL 0.77 (L)   (H): Data is abnormally high  (L): Data is abnormally low  DE: 8/2021  Known complications:  DKA Denies   RN Denies  Eye Exam: needs to make an appointment   PN Yes  Podiatry: 5/2023  Nephropathy Denies  CAD Denies  Reports history of pancreatitis - on Creon.     Diet/Exercise:  Eats 3 meals a day.   Snacks : occasionally before bed  Drinks : water, soft drinks (diet or regular)   Exercise - tries to stay active   Recent illness, injury, steroids: Denies     Current regimen:  Levemir 5 units twice daily   Novolog 4-5-4 units before meals   Add correction scale if needed.   Blood sugar 180 to 230 add 1 units   Blood sugar 231 to 280 add 2 units   Blood sugar greater than 281 add 3 units    Reports compliance.     Other medications tried:  None.     Glucose Monitor:   4 times a day testing  Log reviewed: log provided and reviewed, scanned in media tab                    Hypoglycemia:  Denies.  Knows how to correct with 15 grams of carbs- juice, coke, or a peppermint.       Diabetes Management Status    Hemoglobin A1C   Date Value Ref Range Status   09/19/2023 7.1 (H) 4.0 - 5.6 % Final     Comment:     ADA Screening Guidelines:  5.7-6.4%  Consistent with prediabetes  >or=6.5%  Consistent with diabetes    High levels of fetal hemoglobin interfere with the HbA1C  assay. Heterozygous hemoglobin variants (HbS, HgC, etc)do  not significantly interfere with this assay.   However, presence of multiple variants may affect accuracy.     05/22/2023 7.4 (H) 4.0 - 5.6 % Final     Comment:     ADA Screening Guidelines:  5.7-6.4%  Consistent  with prediabetes  >or=6.5%  Consistent with diabetes    High levels of fetal hemoglobin interfere with the HbA1C  assay. Heterozygous hemoglobin variants (HbS, HgC, etc)do  not significantly interfere with this assay.   However, presence of multiple variants may affect accuracy.     11/30/2022 6.9 (H) 4.0 - 5.6 % Final     Comment:     ADA Screening Guidelines:  5.7-6.4%  Consistent with prediabetes  >or=6.5%  Consistent with diabetes    High levels of fetal hemoglobin interfere with the HbA1C  assay. Heterozygous hemoglobin variants (HbS, HgC, etc)do  not significantly interfere with this assay.   However, presence of multiple variants may affect accuracy.         Statin: Taking  ACE/ARB: Taking  Screening or Prevention Patient's value Goal Complete/Controlled?   HgA1C Testing and Control   Lab Results   Component Value Date    HGBA1C 7.1 (H) 09/19/2023      Annually/Less than 8% Yes     Lipid profile : 09/19/2023 Annually Yes     LDL control Lab Results   Component Value Date    LDLCALC 62.8 (L) 09/19/2023    Annually/Less than 100 mg/dl  Yes     Nephropathy screening Lab Results   Component Value Date    LABMICR 15.0 07/26/2019     Lab Results   Component Value Date    PROTEINUA Negative 09/25/2023    Annually Yes     Blood pressure BP Readings from Last 1 Encounters:   09/25/23 (!) 165/63    Less than 140/90 No     Dilated retinal exam : 04/03/2023 Annually No     Foot exam   Most Recent Foot Exam Date: Not Found Annually Yes       With regards to osteoporosis:     First diagnosed with osteoporosis in 2007.    BMD:   11/5/2021  39% risk of a major osteoporotic fracture in the next 10 years.     30% risk of hip fracture in the next 10 years.     Impression:     *Osteoporosis on treatment with Boniva  *Fracture risk is very high due to FRAX >30% or 4.5%  *Compared with previous DXA, BMD at the lumbar spine has remained stable, and the BMD at the total hip has declined     RECOMMENDATIONS:  *Daily calcium intake  7493-0926 mg, dietary sources preferred; Vitamin D 5050-5973 IU daily.  *Weight bearing exercise and fall precautions.  *At it is unusual to lose bone while on bisphosphonate therapy investigate for non adherence, malabsorption, other secondary causes of bone loss and considers changing to parenteral therapy  *Repeat BMD in 2 years    Medications:   Actonel 8/2018 - 12/2021  She was given first dose of Prolia in 2/2018 and developed joint pains and lower ext edema so it was decided that she would not receive another dose of Prolia. We decided against Reclast for the same reason and instead restarted her on an oral bisphosphonate which she has tolerated without side effects. After her last visit 12/2021 we decided to stop the oral bisphosphonate and try Prolia again. She received Prolia  in 1/2022 without any side effects.  Last Dose: 8/14/2023    Calcium intake: dietary sources   Vit D intake: OTC Vit D3 1000iu daily     Weight bearing exercise: walking   Falls: Denies  Fractures: Denies   Significant height loss (>2 inches): ~ 1/2  inch     Family history: Denies    Menopause: 56y.o.  No HRT.    Tobacco Use: Denies  Alcohol Use: Denies  Glucocorticoid History: Prednisone over the years for asthma.   Anticoagulant Use: Denies  GERD/PPI Use: Denies  History of Malabsorption: Yes  Antiseizure Medications: Denies  History of Thyroid Disease: Denies  Kidney Stones/ Kidney Disease: Denies  Personal history of kidney stones: Denies  Family history of kidney stones: Denies  Family history of bone disease or fracture: Denies    Denies point tenderness along spine  Denies bilateral hip tenderness  Gait - steady without the use of assistive device     Lab Results   Component Value Date    CALCIUM 11.1 (H) 09/24/2023    PHOS 2.8 09/24/2023     Vit D, 25-Hydroxy   Date Value Ref Range Status   05/22/2023 32 30 - 96 ng/mL Final     Comment:     Vitamin D deficiency.........<10 ng/mL                              Vitamin D  "insufficiency......10-29 ng/mL       Vitamin D sufficiency........> or equal to 30 ng/mL  Vitamin D toxicity............>100 ng/mL         Review of Systems  As above    Objective:   Physical Exam  Vitals reviewed.   Cardiovascular:      Rate and Rhythm: Normal rate.      Comments: No edema present  Pulmonary:      Effort: Pulmonary effort is normal.   Abdominal:      Palpations: Abdomen is soft.         Visit Vitals  Pulse 71   Ht 5' 5" (1.651 m)   Wt 48.2 kg (106 lb 6 oz)   SpO2 98%   BMI 17.70 kg/m²           Body mass index is 17.7 kg/m².    Lab Review:   Lab Results   Component Value Date    HGBA1C 7.1 (H) 09/19/2023    HGBA1C 7.4 (H) 05/22/2023    HGBA1C 6.9 (H) 11/30/2022       Lab Results   Component Value Date    CHOL 124 09/19/2023    HDL 53 09/19/2023    LDLCALC 62.8 (L) 09/19/2023    TRIG 41 09/19/2023    CHOLHDL 42.7 09/19/2023     Lab Results   Component Value Date     09/24/2023    K 3.5 09/24/2023     09/24/2023    CO2 28 09/24/2023    GLU 93 09/24/2023    BUN 31 (H) 09/24/2023    CREATININE 1.1 09/24/2023    CALCIUM 11.1 (H) 09/24/2023    PROT 7.5 09/24/2023    ALBUMIN 3.9 09/24/2023    BILITOT 0.5 09/24/2023    ALKPHOS 62 09/24/2023    AST 26 09/24/2023    ALT 25 09/24/2023    ANIONGAP 11 09/24/2023    ESTGFRAFRICA >60.0 07/11/2022    EGFRNONAA >60.0 07/11/2022    TSH 3.743 09/19/2023     Vit D, 25-Hydroxy   Date Value Ref Range Status   05/22/2023 32 30 - 96 ng/mL Final     Comment:     Vitamin D deficiency.........<10 ng/mL                              Vitamin D insufficiency......10-29 ng/mL       Vitamin D sufficiency........> or equal to 30 ng/mL  Vitamin D toxicity............>100 ng/mL       Assessment and Plan     1. Type 2 diabetes mellitus with microalbuminuria, without long-term current use of insulin  Hemoglobin A1C    Comprehensive Metabolic Panel      2. Senile osteoporosis            Type 2 diabetes mellitus with microalbuminuria, without long-term current use of " insulin  -- Labs prior to follow up.  -- History of chronic pancreatitis. Low C peptide - manage as T1DM.  -- Brittle diabetic.  -- A1c goal < or = 8%.  -- Medications discussed:  Insulin   -- Reviewed logs/CGM:  Glucose improved at mostly at goal.  Hypoglycemia resolved.  Not interested in personal CGM.  -- Medication Changes:   Levemir 5 units twice daily   Novolog 4-5-4 units before meals   Add correction scale if needed.   Blood sugar 180 to 230 add 1 units   Blood sugar 231 to 280 add 2 units   Blood sugar greater than 281 add 3 units    -- Reviewed goals of therapy are to get the best control we can without hypoglycemia.  -- Reviewed patient's current insulin regimen. Clarified proper insulin dose and timing in relation to meals, etc. Insulin injection sites and proper rotation instructed.   -- Advised frequent self blood glucose monitoring.  Patient encouraged to document glucose results and bring them to every clinic visit.  -- Hypoglycemia precautions discussed. Instructed on precautions before driving.    -- Call for Bg repeatedly < 90 or > 180.   -- Close adherence to lifestyle changes recommended.   -- Periodic follow ups for eye evaluations, foot care and dental care suggested.    Senile osteoporosis  -- Risks include low weight,  race, menopause, prior chronic glucocorticoid use.  -- Reviewed basics of quantity versus quality.  -- Reassuring they are not fracturing.  -- Recommend:  -Pharmacological therapy is recommended for patients with osteopenia if the 10 year probability of a hip fracture is >3% and 10 year probability of other major osteoporotic fractures is >20%.  Treatment options and potential side effects discussed for PO bisphosphonates, reclast, Denosumab, and Teriparatide.   -Treatment:   Actonel 8/2018 - 12/2021  She was given first dose of Prolia in 2/2018 and developed joint pains and lower ext edema so it was decided that she would not receive another dose of Prolia. We  decided against Reclast for the same reason and instead restarted her on an oral bisphosphonate which she has tolerated without side effects. After her last visit 12/2021 we decided to stop the oral bisphosphonate and try Prolia again. She received Prolia  in 1/2022 without any side effects.  Last Dose: 8/2023  Continue Prolia every 6 months.  -Calcium and Vitamin D RDD provided.  -Exercise: recommended..  -Fall precautions made in the home.  -Alerted that if dental work needs to be done it should be done prior to initiating therapy. Dental health: UTD.  -- Repeat DEXA scan 11/2023.          Follow up in about 4 months (around 1/27/2024).

## 2023-09-24 ENCOUNTER — HOSPITAL ENCOUNTER (EMERGENCY)
Facility: HOSPITAL | Age: 88
Discharge: HOME | End: 2023-09-25
Attending: EMERGENCY MEDICINE
Payer: MEDICARE

## 2023-09-24 DIAGNOSIS — R11.0 NAUSEA: ICD-10-CM

## 2023-09-24 DIAGNOSIS — M25.552 LEFT HIP PAIN: ICD-10-CM

## 2023-09-24 DIAGNOSIS — R10.9 ABDOMINAL PAIN: ICD-10-CM

## 2023-09-24 LAB
ALBUMIN SERPL BCP-MCNC: 3.9 G/DL (ref 3.5–5.2)
ALP SERPL-CCNC: 62 U/L (ref 55–135)
ALT SERPL W/O P-5'-P-CCNC: 25 U/L (ref 10–44)
ANION GAP SERPL CALC-SCNC: 11 MMOL/L (ref 8–16)
AST SERPL-CCNC: 26 U/L (ref 10–40)
BASOPHILS # BLD AUTO: 0.06 K/UL (ref 0–0.2)
BASOPHILS NFR BLD: 0.6 % (ref 0–1.9)
BILIRUB SERPL-MCNC: 0.5 MG/DL (ref 0.1–1)
BUN SERPL-MCNC: 31 MG/DL (ref 8–23)
CALCIUM SERPL-MCNC: 11.1 MG/DL (ref 8.7–10.5)
CHLORIDE SERPL-SCNC: 105 MMOL/L (ref 95–110)
CO2 SERPL-SCNC: 28 MMOL/L (ref 23–29)
CREAT SERPL-MCNC: 1.1 MG/DL (ref 0.5–1.4)
DIFFERENTIAL METHOD: ABNORMAL
EOSINOPHIL # BLD AUTO: 0.6 K/UL (ref 0–0.5)
EOSINOPHIL NFR BLD: 6.2 % (ref 0–8)
ERYTHROCYTE [DISTWIDTH] IN BLOOD BY AUTOMATED COUNT: 13.3 % (ref 11.5–14.5)
EST. GFR  (NO RACE VARIABLE): 48.9 ML/MIN/1.73 M^2
GLUCOSE SERPL-MCNC: 93 MG/DL (ref 70–110)
HCT VFR BLD AUTO: 39.9 % (ref 37–48.5)
HGB BLD-MCNC: 13.4 G/DL (ref 12–16)
IMM GRANULOCYTES # BLD AUTO: 0.03 K/UL (ref 0–0.04)
IMM GRANULOCYTES NFR BLD AUTO: 0.3 % (ref 0–0.5)
LIPASE SERPL-CCNC: 4 U/L (ref 4–60)
LYMPHOCYTES # BLD AUTO: 1.8 K/UL (ref 1–4.8)
LYMPHOCYTES NFR BLD: 18.1 % (ref 18–48)
MAGNESIUM SERPL-MCNC: 1.9 MG/DL (ref 1.6–2.6)
MCH RBC QN AUTO: 31.6 PG (ref 27–31)
MCHC RBC AUTO-ENTMCNC: 33.6 G/DL (ref 32–36)
MCV RBC AUTO: 94 FL (ref 82–98)
MONOCYTES # BLD AUTO: 0.6 K/UL (ref 0.3–1)
MONOCYTES NFR BLD: 6.2 % (ref 4–15)
NEUTROPHILS # BLD AUTO: 6.9 K/UL (ref 1.8–7.7)
NEUTROPHILS NFR BLD: 68.6 % (ref 38–73)
NRBC BLD-RTO: 0 /100 WBC
PHOSPHATE SERPL-MCNC: 2.8 MG/DL (ref 2.7–4.5)
PLATELET # BLD AUTO: 215 K/UL (ref 150–450)
PMV BLD AUTO: 12.2 FL (ref 9.2–12.9)
POTASSIUM SERPL-SCNC: 3.5 MMOL/L (ref 3.5–5.1)
PROT SERPL-MCNC: 7.5 G/DL (ref 6–8.4)
RBC # BLD AUTO: 4.24 M/UL (ref 4–5.4)
SODIUM SERPL-SCNC: 144 MMOL/L (ref 136–145)
WBC # BLD AUTO: 10.07 K/UL (ref 3.9–12.7)

## 2023-09-24 PROCEDURE — 63600175 PHARM REV CODE 636 W HCPCS: Performed by: STUDENT IN AN ORGANIZED HEALTH CARE EDUCATION/TRAINING PROGRAM

## 2023-09-24 PROCEDURE — 83690 ASSAY OF LIPASE: CPT | Performed by: STUDENT IN AN ORGANIZED HEALTH CARE EDUCATION/TRAINING PROGRAM

## 2023-09-24 PROCEDURE — 83735 ASSAY OF MAGNESIUM: CPT | Performed by: STUDENT IN AN ORGANIZED HEALTH CARE EDUCATION/TRAINING PROGRAM

## 2023-09-24 PROCEDURE — 96375 TX/PRO/DX INJ NEW DRUG ADDON: CPT

## 2023-09-24 PROCEDURE — 82962 GLUCOSE BLOOD TEST: CPT

## 2023-09-24 PROCEDURE — 84100 ASSAY OF PHOSPHORUS: CPT | Performed by: STUDENT IN AN ORGANIZED HEALTH CARE EDUCATION/TRAINING PROGRAM

## 2023-09-24 PROCEDURE — 85025 COMPLETE CBC W/AUTO DIFF WBC: CPT | Performed by: STUDENT IN AN ORGANIZED HEALTH CARE EDUCATION/TRAINING PROGRAM

## 2023-09-24 PROCEDURE — 80053 COMPREHEN METABOLIC PANEL: CPT | Performed by: STUDENT IN AN ORGANIZED HEALTH CARE EDUCATION/TRAINING PROGRAM

## 2023-09-24 PROCEDURE — 96374 THER/PROPH/DIAG INJ IV PUSH: CPT

## 2023-09-24 PROCEDURE — 99285 EMERGENCY DEPT VISIT HI MDM: CPT | Mod: 25

## 2023-09-24 RX ORDER — MORPHINE SULFATE 2 MG/ML
2 INJECTION, SOLUTION INTRAMUSCULAR; INTRAVENOUS
Status: COMPLETED | OUTPATIENT
Start: 2023-09-24 | End: 2023-09-24

## 2023-09-24 RX ORDER — ONDANSETRON 2 MG/ML
4 INJECTION INTRAMUSCULAR; INTRAVENOUS
Status: COMPLETED | OUTPATIENT
Start: 2023-09-24 | End: 2023-09-24

## 2023-09-24 RX ADMIN — ONDANSETRON 4 MG: 2 INJECTION INTRAMUSCULAR; INTRAVENOUS at 08:09

## 2023-09-24 RX ADMIN — MORPHINE SULFATE 2 MG: 2 INJECTION, SOLUTION INTRAMUSCULAR; INTRAVENOUS at 09:09

## 2023-09-25 VITALS
SYSTOLIC BLOOD PRESSURE: 165 MMHG | RESPIRATION RATE: 20 BRPM | DIASTOLIC BLOOD PRESSURE: 63 MMHG | OXYGEN SATURATION: 96 % | HEART RATE: 77 BPM | TEMPERATURE: 98 F

## 2023-09-25 LAB
BILIRUB UR QL STRIP: NEGATIVE
CLARITY UR REFRACT.AUTO: CLEAR
COLOR UR AUTO: YELLOW
GLUCOSE UR QL STRIP: NEGATIVE
HGB UR QL STRIP: NEGATIVE
KETONES UR QL STRIP: NEGATIVE
LEUKOCYTE ESTERASE UR QL STRIP: NEGATIVE
NITRITE UR QL STRIP: NEGATIVE
PH UR STRIP: 5 [PH] (ref 5–8)
POCT GLUCOSE: 96 MG/DL (ref 70–110)
PROT UR QL STRIP: NEGATIVE
SP GR UR STRIP: 1.01 (ref 1–1.03)
URN SPEC COLLECT METH UR: NORMAL

## 2023-09-25 PROCEDURE — 93010 ELECTROCARDIOGRAM REPORT: CPT | Mod: ,,, | Performed by: INTERNAL MEDICINE

## 2023-09-25 PROCEDURE — 25000003 PHARM REV CODE 250: Performed by: EMERGENCY MEDICINE

## 2023-09-25 PROCEDURE — 81003 URINALYSIS AUTO W/O SCOPE: CPT | Performed by: STUDENT IN AN ORGANIZED HEALTH CARE EDUCATION/TRAINING PROGRAM

## 2023-09-25 PROCEDURE — 96361 HYDRATE IV INFUSION ADD-ON: CPT

## 2023-09-25 PROCEDURE — 93010 EKG 12-LEAD: ICD-10-PCS | Mod: ,,, | Performed by: INTERNAL MEDICINE

## 2023-09-25 PROCEDURE — 93005 ELECTROCARDIOGRAM TRACING: CPT

## 2023-09-25 RX ADMIN — SODIUM CHLORIDE 500 ML: 9 INJECTION, SOLUTION INTRAVENOUS at 12:09

## 2023-09-25 NOTE — PROVIDER PROGRESS NOTES - EMERGENCY DEPT.
Encounter Date: 9/24/2023    ED Physician Progress Notes          ED Resident HAND-OFF NOTE:  Sussy Costa is a 87 y.o. female who presented to the ED on 9/24/2023, patient C/O back and leg pain. I assumed care of patient from off-going ED physician team patient pending MRI lumbar spine and pain control.    On my evaluation, Sussy Costa appears well, hemodynamically stable and in NAD. Thus far, Sussy Costa has received:  Medications   ondansetron injection 4 mg (4 mg Intravenous Given 9/24/23 2032)   morphine injection 2 mg (2 mg Intravenous Given 9/24/23 2106)       BP (!) 183/73   Pulse 66   Temp 97.8 °F (36.6 °C) (Oral)   Resp 16   SpO2 96%   Breastfeeding No         Disposition: I anticipate patient will discharged  ______________________  Danish Alvarado MD   Emergency Medicine Resident      UPDATE: MRI resulted in no acute cord compression.  Pt has chronic disk protrusion and spinal stenosis.  Pt was retaining urine in the emergency department setting.  Drained bladder was straight cath.  Patient decided not to go home with Guevara catheter in place.  She understands that she must return to the emergency department tomorrow afternoon if she does not begin to urinate normally.        :  Left hip pain  Abdominal pain

## 2023-09-25 NOTE — ED PROVIDER NOTES
"Encounter Date: 9/24/2023       History     Chief Complaint   Patient presents with    Weakness     Patient reports that she has been having generalized weakness. States that she was seen on Friday and told that it was her sciatica in her left leg, was prescribed Baclofen and Gabapentin, but states that it has not helped her symptoms. States she has been using her walker at home for assistance but that she does not normally use her walker every day.        87-year-old female past medical history of hypertension, hyperlipidemia, diabetes to ED with complaint of progressive pain in her left lower extremity.  Patient states that she had been lifting a bale of hay 5 days ago when she initially began to feel pain in her left waist traveling down her leg.  She went to an urgent care 2 days ago they diagnosed her with sciatica and prescribed her baclofen and gabapentin.  Patient states the medication is not been helping and has only made her sleepy and "out of it".  Today, she states the pain became so severe that she had multiple episodes of emesis.  She has needed to ambulate constantly with her walker and reports at baseline, she typically does not need her walker.  She checked her blood sugar prior to coming to the ED and states that it was in the 160s.  She took her regular insulin prior to arrival.  She denies lower back pain, numbness, tingling, leg swelling, chest pain or shortness of breath.      Review of patient's allergies indicates:   Allergen Reactions    Aspirin Other (See Comments)     Asthma    TRIAD OF NASAL POLYPS, ASA  ALLERGY AND ASTHMA.        Aspirin Other (See Comments)    Nsaids (non-steroidal anti-inflammatory drug) Other (See Comments)     AVOID DUE TO TRIAD OF ASA ALLERGY, NASAL POLYPS,AND ASTHMA  AVOID DUE TO TRIAD OF ASA ALLERGY, NASAL POLYPS,AND ASTHMA    Penicillin      Other reaction(s): Rash    9/30/20: tolerates ceftriaxone    Penicillin g Other (See Comments)     Other reaction(s): " Rash    9/30/20: tolerates ceftriaxone     Past Medical History:   Diagnosis Date    Asthma     Cyst of pancreas     liver    Diabetes mellitus type II     Eczema of hand     Glaucoma     History of recurrent pneumonia 9/28/2020    Hyperlipidemia     Hypertension     Lichen planus     gums    Osteoporosis     Pulmonary nodules      Past Surgical History:   Procedure Laterality Date    BRONCHOSCOPY N/A 5/13/2022    Procedure: Bronchoscopy;  Surgeon: Tina Diagnostic Provider;  Location: [address];  Service: Anesthesiology;  Laterality: N/A;    CATARACT EXTRACTION, BILATERAL      CHOLECYSTECTOMY      COLECTOMY, RIGHT Right 10/25/2022    Procedure: COLECTOMY, RIGHT;  Surgeon: Jass Holman MD;  [facility];  Service: Colon and Rectal;  Laterality: Right;    COLONOSCOPY N/A 6/26/2023    Procedure: COLONOSCOPY;  Surgeon: Jass Holman MD;  Location[facility]  Service: Endoscopy;  Laterality: N/A;  2nd floor -lung disease  referral Dr [id number] portal/mailed-GT  6/20/23- [place] confirmed- [place]    ENDOSCOPIC ULTRASOUND OF UPPER GASTROINTESTINAL TRACT N/A 4/22/2022    Procedure: ULTRASOUND, UPPER GI TRACT, ENDOSCOPIC;  Surgeon: Max Rosado MD;  Location: [facility] ENDO ([address] [place] [address]);  Service: Endoscopy;  Laterality: N/A;  urgent EUS (linear) for abnormal CT scan with dilated PD and increased cyst of pancreas cyst.  pap 44 mmHg  4/13:fully vaccinated. instructions via portal-SC    EPIDURAL STEROID INJECTION INTO LUMBAR SPINE N/A 11/8/2018    Procedure: Injection-steroid-epidural-lumbar L5-S1;  Surgeon: Nicci Osorio Jr., MD;  Location: [facility] PAIN MGT;  Service: Pain Management;  Laterality: N/A;    FUNCTIONAL ENDOSCOPIC SINUS SURGERY (FESS) USING COMPUTER-ASSISTED NAVIGATION N/A 6/17/2019    Procedure: FESS, USING COMPUTER-ASSISTED NAVIGATION;  Surgeon: Rosangela Isabel MD;  Location: [facility] OR;  Service: ENT;  Laterality: N/A;  video    HYSTERECTOMY      nasal  polyps       Family History   Problem Relation Age of Onset    Heart disease Father     Heart disease Brother     Hypertension Brother      Social History     Tobacco Use    Smoking status: Never     Passive exposure: Never    Smokeless tobacco: Never   Substance Use Topics    Alcohol use: Yes     Comment: socially    Drug use: No     Review of Systems   Constitutional:  Positive for fatigue. Negative for chills and fever.   HENT:  Negative for congestion and rhinorrhea.    Eyes:  Negative for pain and visual disturbance.   Respiratory:  Negative for cough and shortness of breath.    Cardiovascular:  Negative for chest pain and palpitations.   Gastrointestinal:  Positive for nausea and vomiting. Negative for abdominal pain.   Genitourinary:  Negative for difficulty urinating and dysuria.   Musculoskeletal:  Positive for gait problem (requiring walker to ambulate due to LLE pain).   Skin:  Negative for rash and wound.   Neurological:  Positive for weakness (generalized). Negative for numbness and headaches.       Physical Exam     Initial Vitals [09/24/23 1939]   BP Pulse Resp Temp SpO2   (!) 175/85 70 18 97.8 °F (36.6 °C) 99 %      MAP       --         Physical Exam    Nursing note and vitals reviewed.  Constitutional: She is not diaphoretic. No distress.   HENT:   Head: Normocephalic and atraumatic.   Mouth/Throat: Oropharynx is clear and moist.   Eyes: Conjunctivae and EOM are normal.   Neck: Neck supple.   Normal range of motion.  Cardiovascular:  Normal rate, regular rhythm and normal heart sounds.           Pulmonary/Chest: Breath sounds normal. She has no wheezes. She has no rhonchi. She has no rales.   Abdominal: Abdomen is soft. She exhibits no distension. There is no abdominal tenderness.   Musculoskeletal:         General: No edema. Normal range of motion.      Cervical back: Normal range of motion and neck supple.      Comments: No midline spinal tenderness to palpation.  TTP at lateral left hip and  diffusely over lateral mid femur with no swelling or skin changes.     Neurological: She is alert and oriented to person, place, and time.   Skin: Skin is warm and dry. Capillary refill takes less than 2 seconds.         ED Course   Procedures  Labs Reviewed   CBC W/ AUTO DIFFERENTIAL - Abnormal; Notable for the following components:       Result Value    MCH 31.6 (*)     Eos # 0.6 (*)     All other components within normal limits   COMPREHENSIVE METABOLIC PANEL - Abnormal; Notable for the following components:    BUN 31 (*)     Calcium 11.1 (*)     eGFR 48.9 (*)     All other components within normal limits   MAGNESIUM   PHOSPHORUS   LIPASE   URINALYSIS, REFLEX TO URINE CULTURE   POCT GLUCOSE MONITORING CONTINUOUS          Imaging Results              X-Ray Abdomen Flat And Erect (Final result)  Result time 09/24/23 22:23:14      Final result by Cj Leal MD (09/24/23 22:23:14)                   Impression:      Nonobstructed bowel-gas pattern.    Pancreatic calcifications suggesting chronic pancreatitis as seen on prior CT.      Electronically signed by: Cj Leal MD  Date:    09/24/2023  Time:    22:23               Narrative:    EXAMINATION:  XR ABDOMEN FLAT AND ERECT    CLINICAL HISTORY:  Unspecified abdominal pain    TECHNIQUE:  Flat and erect AP views of the abdomen were preformed.    COMPARISON:  CT 05/01/2023.    FINDINGS:  Calcifications in the pancreas suggestive of chronic pancreatitis, corresponding to findings on CT exam.  Bowel gas pattern is non-obstructive.  No evidence for pneumoperitoneum.  Surgical clips in the pelvis.  Degenerative changes right hip, mildly advanced compared to 05/19/2014.                                       X-Ray Femur Ap/Lat Left (Final result)  Result time 09/24/23 22:19:18      Final result by Cj Leal MD (09/24/23 22:19:18)                   Impression:      No acute fracture left femur.      Electronically signed by: Cj Leal  MD  Date:    09/24/2023  Time:    22:19               Narrative:    EXAMINATION:  XR FEMUR 2 VIEW LEFT    CLINICAL HISTORY:  Pain in left hip    TECHNIQUE:  AP and lateral views of the left femur were performed.    COMPARISON:  None    FINDINGS:  No fractures or dislocations.  Mild degenerative changes..  Vascular calcifications present.  Surgical clips in the left pelvis.                                       Medications   ondansetron injection 4 mg (4 mg Intravenous Given 9/24/23 2032)   morphine injection 2 mg (2 mg Intravenous Given 9/24/23 2106)     Medical Decision Making  Patient is hypertensive but otherwise hemodynamically stable.  Zofran and morphine given for symptom management.  CBC, CMP, magnesium and phosphorus are within normal limits.  KUB and x-ray of left femur are unremarkable.  MRI of lumbar spine is pending.  Care of this patient signed out to oncoming ED team.    Amount and/or Complexity of Data Reviewed  Labs: ordered. Decision-making details documented in ED Course.  Radiology: ordered. Decision-making details documented in ED Course.    Risk  Prescription drug management.              Attending Attestation:   Physician Attestation Statement for Resident:  As the supervising MD   Physician Attestation Statement: I have personally seen and examined this patient.   I agree with the above history.  -:   As the supervising MD I agree with the above PE.   -: Normal sensation of extremities to light touch.  L leg has 4/5 strength of knee extension and hip flexion, otherwise 5/5 strength.  No upper extremity weakness noted.  No gross vascular compromise of L leg noted.   As the supervising MD I agree with the above treatment, course, plan, and disposition.   -: Given symptoms of L leg pain and mild weakness concern for herniated disc with neurologic compromise, likely lumbar given the sciatic type pain.   MRI L spine pending.  XR abdomen done for vomiting without signs of obstruction, abdominal exam  is benign. Labs reassuring.  Signed out pending MRI.   I have reviewed and agree with the residents interpretation of the following: lab data.                 ED Course as of 09/26/23 1443   Mon Sep 25, 2023   0025 MRI results in no acute fracture or dislocation of the lumbar spine.  Does indicate L4-L5 disc protrusion with spinal stenosis. [VC]   0142 Bladder scan resulted in greater than 450 mL of urine.  We will straight cath and reassess. [VC]   0155 Post catheter bladder scan resulted and 8 mL of retained urine.  Discussed situation with the patient, shared decision-making resulted in patient deciding to go home without a urinary catheter in place.  Patient understands that she is not able to urinate normally by tomorrow afternoon that she needs to come back to the emergency department for Guevara catheter to be placed. [VC]      ED Course User Index  [VC] Shmuel Alvarado MD                    Clinical Impression:   Final diagnoses:  [M25.552] Left hip pain  [R10.9] Abdominal pain               Sandra Galeano MD  Resident  09/24/23 1692       Van Corona MD  09/26/23 1115

## 2023-09-25 NOTE — DISCHARGE INSTRUCTIONS
Please return to the emergency department if you are not able to urinate normally by tomorrow afternoon.

## 2023-09-25 NOTE — ED TRIAGE NOTES
Sussy MILES [place]ia, a 87 y.o. female presents to the ED w/ complaint of hurt hip Tuesday. Seen at urgent care Friday and given baclofen and gabapentin. No improvement since Friday and now having to use walker at home.     Triage note:  Chief Complaint   Patient presents with    Weakness     Patient reports that she has been having generalized weakness. States that she was seen on Friday and told that it was her sciatica in her left leg, was prescribed Baclofen and Gabapentin, but states that it has not helped her symptoms. States she has been using her walker at home for assistance but that she does not normally use her walker every day.        Review of patient's allergies indicates:   Allergen Reactions    Aspirin Other (See Comments)     Asthma    TRIAD OF NASAL POLYPS, ASA  ALLERGY AND ASTHMA.        Aspirin Other (See Comments)    Nsaids (non-steroidal anti-inflammatory drug) Other (See Comments)     AVOID DUE TO TRIAD OF ASA ALLERGY, NASAL POLYPS,AND ASTHMA  AVOID DUE TO TRIAD OF ASA ALLERGY, NASAL POLYPS,AND ASTHMA    Penicillin      Other reaction(s): Rash    9/30/20: tolerates ceftriaxone    Penicillin g Other (See Comments)     Other reaction(s): Rash    9/30/20: tolerates ceftriaxone     Past Medical History:   Diagnosis Date    Asthma     Cyst of pancreas     liver    Diabetes mellitus type II     Eczema of hand     Glaucoma     History of recurrent pneumonia 9/28/2020    Hyperlipidemia     Hypertension     Lichen planus     gums    Osteoporosis     Pulmonary nodules

## 2023-09-27 ENCOUNTER — OFFICE VISIT (OUTPATIENT)
Dept: ENDOCRINOLOGY | Facility: CLINIC | Age: 88
End: 2023-09-27
Payer: MEDICARE

## 2023-09-27 VITALS — HEART RATE: 71 BPM | BODY MASS INDEX: 17.72 KG/M2 | OXYGEN SATURATION: 98 % | HEIGHT: 65 IN | WEIGHT: 106.38 LBS

## 2023-09-27 DIAGNOSIS — E11.29 TYPE 2 DIABETES MELLITUS WITH MICROALBUMINURIA, WITHOUT LONG-TERM CURRENT USE OF INSULIN: Primary | ICD-10-CM

## 2023-09-27 DIAGNOSIS — R80.9 TYPE 2 DIABETES MELLITUS WITH MICROALBUMINURIA, WITHOUT LONG-TERM CURRENT USE OF INSULIN: Primary | ICD-10-CM

## 2023-09-27 DIAGNOSIS — M81.0 SENILE OSTEOPOROSIS: ICD-10-CM

## 2023-09-27 LAB — MYCOBACTERIUM SPEC CULT: ABNORMAL

## 2023-09-27 PROCEDURE — 99215 OFFICE O/P EST HI 40 MIN: CPT | Mod: PBBFAC | Performed by: NURSE PRACTITIONER

## 2023-09-27 PROCEDURE — 99999 PR PBB SHADOW E&M-EST. PATIENT-LVL V: ICD-10-PCS | Mod: PBBFAC,,, | Performed by: NURSE PRACTITIONER

## 2023-09-27 PROCEDURE — 99999 PR PBB SHADOW E&M-EST. PATIENT-LVL V: CPT | Mod: PBBFAC,,, | Performed by: NURSE PRACTITIONER

## 2023-09-27 PROCEDURE — 99214 PR OFFICE/OUTPT VISIT, EST, LEVL IV, 30-39 MIN: ICD-10-PCS | Mod: S$PBB,,, | Performed by: NURSE PRACTITIONER

## 2023-09-27 PROCEDURE — 99214 OFFICE O/P EST MOD 30 MIN: CPT | Mod: S$PBB,,, | Performed by: NURSE PRACTITIONER

## 2023-09-27 NOTE — ASSESSMENT & PLAN NOTE
-- Risks include low weight,  race, menopause, prior chronic glucocorticoid use.  -- Reviewed basics of quantity versus quality.  -- Reassuring they are not fracturing.  -- Recommend:  -Pharmacological therapy is recommended for patients with osteopenia if the 10 year probability of a hip fracture is >3% and 10 year probability of other major osteoporotic fractures is >20%.  Treatment options and potential side effects discussed for PO bisphosphonates, reclast, Denosumab, and Teriparatide.   -Treatment:   Actonel 8/2018 - 12/2021  She was given first dose of Prolia in 2/2018 and developed joint pains and lower ext edema so it was decided that she would not receive another dose of Prolia. We decided against Reclast for the same reason and instead restarted her on an oral bisphosphonate which she has tolerated without side effects. After her last visit 12/2021 we decided to stop the oral bisphosphonate and try Prolia again. She received Prolia  in 1/2022 without any side effects.  Last Dose: 8/2023  Continue Prolia every 6 months.  -Calcium and Vitamin D RDD provided.  -Exercise: recommended..  -Fall precautions made in the home.  -Alerted that if dental work needs to be done it should be done prior to initiating therapy. Dental health: UTD.  -- Repeat DEXA scan 11/2023.

## 2023-09-27 NOTE — ASSESSMENT & PLAN NOTE
-- Labs prior to follow up.  -- History of chronic pancreatitis. Low C peptide - manage as T1DM.  -- Brittle diabetic.  -- A1c goal < or = 8%.  -- Medications discussed:  Insulin   -- Reviewed logs/CGM:  Glucose improved at mostly at goal.  Hypoglycemia resolved.  Not interested in personal CGM.  -- Medication Changes:   Levemir 5 units twice daily   Novolog 4-5-4 units before meals   Add correction scale if needed.   Blood sugar 180 to 230 add 1 units   Blood sugar 231 to 280 add 2 units   Blood sugar greater than 281 add 3 units    -- Reviewed goals of therapy are to get the best control we can without hypoglycemia.  -- Reviewed patient's current insulin regimen. Clarified proper insulin dose and timing in relation to meals, etc. Insulin injection sites and proper rotation instructed.   -- Advised frequent self blood glucose monitoring.  Patient encouraged to document glucose results and bring them to every clinic visit.  -- Hypoglycemia precautions discussed. Instructed on precautions before driving.    -- Call for Bg repeatedly < 90 or > 180.   -- Close adherence to lifestyle changes recommended.   -- Periodic follow ups for eye evaluations, foot care and dental care suggested.

## 2023-10-16 DIAGNOSIS — R80.9 TYPE 2 DIABETES MELLITUS WITH MICROALBUMINURIA, WITHOUT LONG-TERM CURRENT USE OF INSULIN: ICD-10-CM

## 2023-10-16 DIAGNOSIS — E11.29 TYPE 2 DIABETES MELLITUS WITH MICROALBUMINURIA, WITHOUT LONG-TERM CURRENT USE OF INSULIN: ICD-10-CM

## 2023-10-16 LAB
LEFT EYE DM RETINOPATHY: POSITIVE
RIGHT EYE DM RETINOPATHY: POSITIVE

## 2023-10-16 RX ORDER — LANCETS
EACH MISCELLANEOUS
Qty: 200 EACH | Refills: 3 | Status: SHIPPED | OUTPATIENT
Start: 2023-10-16 | End: 2023-12-15 | Stop reason: SDUPTHER

## 2023-10-17 ENCOUNTER — OFFICE VISIT (OUTPATIENT)
Dept: PULMONOLOGY | Facility: CLINIC | Age: 88
End: 2023-10-17
Payer: MEDICARE

## 2023-10-17 VITALS
OXYGEN SATURATION: 98 % | BODY MASS INDEX: 18.07 KG/M2 | WEIGHT: 108.44 LBS | HEIGHT: 65 IN | DIASTOLIC BLOOD PRESSURE: 84 MMHG | SYSTOLIC BLOOD PRESSURE: 152 MMHG | HEART RATE: 99 BPM

## 2023-10-17 DIAGNOSIS — J45.40 MODERATE PERSISTENT ASTHMA WITHOUT COMPLICATION: ICD-10-CM

## 2023-10-17 PROCEDURE — 99213 OFFICE O/P EST LOW 20 MIN: CPT | Mod: S$PBB,,, | Performed by: INTERNAL MEDICINE

## 2023-10-17 PROCEDURE — 99213 PR OFFICE/OUTPT VISIT, EST, LEVL III, 20-29 MIN: ICD-10-PCS | Mod: S$PBB,,, | Performed by: INTERNAL MEDICINE

## 2023-10-17 PROCEDURE — 99213 OFFICE O/P EST LOW 20 MIN: CPT | Mod: PBBFAC | Performed by: INTERNAL MEDICINE

## 2023-10-17 PROCEDURE — 99999 PR PBB SHADOW E&M-EST. PATIENT-LVL III: CPT | Mod: PBBFAC,,, | Performed by: INTERNAL MEDICINE

## 2023-10-17 PROCEDURE — 99999 PR PBB SHADOW E&M-EST. PATIENT-LVL III: ICD-10-PCS | Mod: PBBFAC,,, | Performed by: INTERNAL MEDICINE

## 2023-10-17 NOTE — PROGRESS NOTES
Subjective:       Patient ID: Sussy Costa is a 88 y.o. female.    Chief Complaint: Asthma    HPI:   Sussy Costa is a 88 y.o. female who presents for follow up.     Cough has improved since using trelegy.  Never obtained the flutter valve.  Decided to defer treatment with Fasenra.    ____________________________________  She reports chest congestion since april  Mucinex helps some, but she still has chest congestion  Previously followed by Dr. Peguero    +allergies.   Using advair twice a day.     History of carcinoid s/p surgery.  Some lung mets on imaging.    Review of Systems   Constitutional:  Negative for weight loss.   Respiratory:  Negative for cough, sputum production, shortness of breath, wheezing and dyspnea on extertion.          Social History     Tobacco Use    Smoking status: Never     Passive exposure: Never    Smokeless tobacco: Never   Substance Use Topics    Alcohol use: Yes     Comment: socially       Review of patient's allergies indicates:   Allergen Reactions    Aspirin Other (See Comments)     Asthma    TRIAD OF NASAL POLYPS, ASA  ALLERGY AND ASTHMA.        Aspirin Other (See Comments)    Nsaids (non-steroidal anti-inflammatory drug) Other (See Comments)     AVOID DUE TO TRIAD OF ASA ALLERGY, NASAL POLYPS,AND ASTHMA  AVOID DUE TO TRIAD OF ASA ALLERGY, NASAL POLYPS,AND ASTHMA    Penicillin      Other reaction(s): Rash    9/30/20: tolerates ceftriaxone    Penicillin g Other (See Comments)     Other reaction(s): Rash    9/30/20: tolerates ceftriaxone     Past Medical History:   Diagnosis Date    Asthma     Cyst of pancreas     liver    Diabetes mellitus type II     Eczema of hand     Glaucoma     History of recurrent pneumonia 9/28/2020    Hyperlipidemia     Hypertension     Lichen planus     gums    Osteoporosis     Pulmonary nodules      Past Surgical History:   Procedure Laterality Date    BRONCHOSCOPY N/A 5/13/2022    Procedure: Bronchoscopy;  Surgeon: [facility] Diagnostic Provider;   Location: [facility] OR 2ND FLR;  Service: Anesthesiology;  Laterality: N/A;    CATARACT EXTRACTION, BILATERAL      CHOLECYSTECTOMY      COLECTOMY, RIGHT Right 10/25/2022    Procedure: COLECTOMY, RIGHT;  Surgeon: Jass Holman MD;  Location: [facility] OR 2ND FLR;  Service: Colon and Rectal;  Laterality: Right;    COLONOSCOPY N/A 2023    Procedure: COLONOSCOPY;  Surgeon: Jass Holman MD;  Location: [facility] ENDO (2ND FLR);  Service: Endoscopy;  Laterality: N/A;  2nd floor -lung disease  referral Dr MartinezCmqxbvb-jngx-ohuxn portal/mailed-GT  23- Precall confirmed- [place]    ENDOSCOPIC ULTRASOUND OF UPPER GASTROINTESTINAL TRACT N/A 2022    Procedure: ULTRASOUND, UPPER GI TRACT, ENDOSCOPIC;  Surgeon: Max Rosado MD;  Location: [facility] ENDO (2ND FLR);  Service: Endoscopy;  Laterality: N/A;  urgent EUS (linear) for abnormal CT scan with dilated PD and increased cyst of pancreas cyst.  pap 44 mmHg  :fully vaccinated. instructions via portal-[facility]    EPIDURAL STEROID INJECTION INTO LUMBAR SPINE N/A 2018    Procedure: Injection-steroid-epidural-lumbar L5-S1;  Surgeon: Nicci Osorio Jr., MD;  Location: [facility] PAIN MGT;  Service: Pain Management;  Laterality: N/A;    FUNCTIONAL ENDOSCOPIC SINUS SURGERY (FESS) USING COMPUTER-ASSISTED NAVIGATION N/A 2019    Procedure: FESS, USING COMPUTER-ASSISTED NAVIGATION;  Surgeon: Rosangela Isabel MD;  Location: [facility] OR;  Service: ENT;  Laterality: N/A;  video    HYSTERECTOMY      nasal polyps       Current Outpatient Medications on File Prior to Visit   Medication Sig    ACCU-CHEK GUIDE TEST STRIPS Strp TEST FOUR TIMES DAILY WITH MEALS AND NIGHTLY    atorvastatin (LIPITOR) 20 MG tablet Take 1 tablet (20 mg total) by mouth every evening.    budesonide 1 mg/2 mL NbSp SMARTSI Vial(s) Both Nares Twice Daily    chlorthalidone (HYGROTEN) 25 MG Tab Take 1 tablet (25 mg total) by mouth once daily.    diclofenac sodium (VOLTAREN) 1 % Gel Apply 2 g topically 4 (four)  "times daily.    fluticasone-umeclidin-vilanter (TRELEGY ELLIPTA) 200-62.5-25 mcg inhaler Inhale 1 puff into the lungs once daily.    gabapentin (NEURONTIN) 100 MG capsule Take 1 capsule (100 mg total) by mouth 3 (three) times daily.    hydrocortisone 2.5 % cream Apply topically 2 (two) times daily. For one week only    insulin aspart U-100 (NOVOLOG FLEXPEN U-100 INSULIN) 100 unit/mL (3 mL) InPn pen Inject 4 Units into the skin before breakfast AND 5 Units daily with lunch AND 4 Units daily with dinner or evening meal. Plus correction scale. Max TDD 30 units..    insulin detemir U-100, Levemir, (LEVEMIR FLEXTOUCH U-100 INSULN) 100 unit/mL (3 mL) InPn pen Inject 5 Units into the skin 2 (two) times daily.    irbesartan (AVAPRO) 300 MG tablet Take 1 tablet (300 mg total) by mouth every evening.    lancets (ACCU-CHEK FASTCLIX LANCET DRUM) [facility] CHECK BLOOD GLUCOSE FOUR TIMES DAILY    latanoprost 0.005 % ophthalmic solution Inject into the eye. 1 Drops Ophthalmic Every evening    levocetirizine (XYZAL) 5 MG tablet Take 1 tablet (5 mg total) by mouth every evening.    lipase-protease-amylase 24,000-76,000-120,000 units (CREON) 24,000-76,000 -120,000 unit capsule Take 2 capsules with meals orally and 1 capsule with snacks.    magnesium oxide (MAG-OX) 400 mg (241.3 mg magnesium) tablet Take 400 mg by mouth once daily.    montelukast (SINGULAIR) 10 mg tablet Take 1 tablet (10 mg total) by mouth every evening.    NEILMED SINUS RINSE REFILL Pack use as directed    olopatadine (PATANOL) 0.1 % ophthalmic solution Place 1 drop into both eyes 2 (two) times daily as needed. 1 Drops Ophthalmic Twice a day     pen needle, diabetic 31 gauge x 5/16" Ndle Use with insulin pen 5 times a day    vitamin D (VITAMIN D3) 1000 units Tab Take 1,000 Units by mouth once daily.    albuterol (PROVENTIL) 2.5 mg /3 mL (0.083 %) nebulizer solution Take 3 mLs (2.5 mg total) by nebulization every 6 (six) hours as needed for Wheezing or Shortness of " "Breath. Rescue    baclofen (LIORESAL) 10 MG tablet Take 1 tablet (10 mg total) by mouth every 8 (eight) hours as needed. (Patient not taking: Reported on 9/27/2023)    benralizumab (FASENRA PEN) 30 mg/mL AtIn Inject 30 mg into the skin As instructed. Every 28 days for the first three doses    benralizumab (FASENRA PEN) 30 mg/mL AtIn Inject 30 mg into the skin As instructed. Every 8 weeks    blood sugar diagnostic Strp 1 strip by Misc.(Non-Drug; Combo Route) route 4 (four) times daily with meals and nightly.    LIDOcaine 3 % Crea Apply 1 application topically 2 (two) times a day.    olopatadine (PATADAY) 0.2 % Drop Place into both eyes.    olopatadine (PATADAY) 0.2 % Drop Apply 1 drop to eye daily as needed.    [DISCONTINUED] risedronate (ACTONEL) 35 MG tablet Take 1 tablet (35 mg total) by mouth every 7 days.     No current facility-administered medications on file prior to visit.       Objective:      Vitals:    10/17/23 1026   BP: (!) 152/84   BP Location: Right arm   Patient Position: Sitting   Pulse: 99   SpO2: 98%   Weight: 49.2 kg (108 lb 7.5 oz)   Height: 5' 5" (1.651 m)     Physical Exam   Constitutional: She is oriented to person, place, and time. She appears well-developed and well-nourished.   HENT:   Mouth/Throat: Mallampati Score: IV.   Cardiovascular: Normal rate and regular rhythm.   No murmur heard.  Pulmonary/Chest: Normal expansion and effort normal. She has no wheezes. She has no rhonchi.   Musculoskeletal:         General: No edema.   Neurological: She is alert and oriented to person, place, and time.   Skin: Skin is warm and dry.   Psychiatric: She has a normal mood and affect. Her behavior is normal.     Personal Diagnostic Review        10/17/2023    10:26 AM 9/27/2023     1:45 PM 9/25/2023     1:00 AM 9/25/2023    12:30 AM 9/25/2023    12:11 AM 9/24/2023    11:00 PM 9/24/2023    10:30 PM   Pulmonary Function Tests   SpO2 98 % 98 % 96 % 96 % 95 % 97 % 100 %   Height 5' 5" (1.651 m) 5' 5" " (1.651 m)        Weight 49.2 kg (108 lb 7.5 oz) 48.2 kg (106 lb 6 oz)        BMI (Calculated) 18 17.7              MRI Lumbar Spine Without Contrast  Narrative: EXAMINATION:  MRI LUMBAR SPINE WITHOUT CONTRAST    CLINICAL HISTORY:  Back trauma, abnormal neuro exam, CT or xray positive (Age >= 16y);    TECHNIQUE:  Multiplanar, multisequence MR images were acquired from the thoracolumbar junction to the sacrum without the administration of contrast.    COMPARISON:  Lumbar MRI 09/04/2018.    FINDINGS:  Alignment: Normal.    Vertebrae: Mildly heterogeneous marrow signal, likely age related.  No acute fracture.    Discs: Mild multilevel disc height loss and desiccation.    Cord: Normal.  Conus terminates at L1.  Cauda equina layers dependently within the thecal sac.    Degenerative findings:    Multilevel degenerative changes of the lumbar spine, most advanced at L4-L5 with a circumferential disc bulge with superimposed central protrusion encroaching upon the left greater than right neural foraminal zones.  In combination with buckling of the left greater than right ligamentum flavum and facet arthropathy, findings result in severe spinal canal stenosis and moderate left, mild right neural foraminal narrowing.  Overall degenerative findings are similar compared to 09/04/2018.    Paraspinal muscles & soft tissues: Fatty atrophy of the lower paraspinal musculature.    Regional structures: Small bilateral cortical T2 hyperintense lesions, likely cysts.  Impression: No acute fracture or traumatic malalignment of the lumbar spine.    Multilevel degenerative changes of the lumbar spine, most advanced at L4-L5 with disc protrusion resulting in severe spinal canal stenosis, similar when compared to MRI 09/04/2018.    This report was flagged in Epic as abnormal.    Electronically signed by resident: Evan Tabor  Date:    09/24/2023  Time:    23:53    Electronically signed by: Cj Leal  MD  Date:    09/25/2023  Time:    00:07    Respiratory cultures:  8/22: pseudomonas (likely colonization- did not treat)  8/8/23: AFB positive with NTM (patient stopped producing sputum so 2nd sample was not obtained)  Assessment:     No orders of the defined types were placed in this encounter.    1. Moderate persistent asthma without complication          Plan:         Problem List Items Addressed This Visit          Pulmonary    Asthma, moderate persistent    Overview     Well controlled on Trelegy, singulair, and albuterol prn.         Current Assessment & Plan     Continue current inhaler therapy.  Deferring fasenra treatment for now- though patient meets all criteria.

## 2023-10-17 NOTE — ASSESSMENT & PLAN NOTE
Continue current inhaler therapy.  Deferring fasenra treatment for now- though patient meets all criteria.

## 2023-10-27 ENCOUNTER — TELEPHONE (OUTPATIENT)
Dept: INTERNAL MEDICINE | Facility: CLINIC | Age: 88
End: 2023-10-27
Payer: MEDICARE

## 2023-10-27 RX ORDER — NITROFURANTOIN 25; 75 MG/1; MG/1
100 CAPSULE ORAL 2 TIMES DAILY
Qty: 14 CAPSULE | Refills: 0 | Status: ON HOLD | OUTPATIENT
Start: 2023-10-27 | End: 2023-11-20 | Stop reason: HOSPADM

## 2023-10-27 NOTE — TELEPHONE ENCOUNTER
----- Message from Antonia Watts sent at 10/27/2023 11:56 AM CDT -----  Contact: P [phone]  1MEDICALADVICE     Patient is calling for Medical Advice regarding: burning sensation  during urination.     How long has patient had these symptoms: 2 weeks ago     Pharmacy name and phone#:    [facility] - [place] - [address]  [address] [address]  Phone: [phone] Fax: [phone]       Would like response via mychart:  Phone call     Comments:

## 2023-11-02 ENCOUNTER — HOSPITAL ENCOUNTER (OUTPATIENT)
Dept: RADIOLOGY | Facility: HOSPITAL | Age: 88
Discharge: HOME | End: 2023-11-02
Attending: PHYSICIAN ASSISTANT
Payer: MEDICARE

## 2023-11-02 ENCOUNTER — OFFICE VISIT (OUTPATIENT)
Dept: ORTHOPEDICS | Facility: CLINIC | Age: 88
End: 2023-11-02
Payer: MEDICARE

## 2023-11-02 VITALS
BODY MASS INDEX: 18.18 KG/M2 | HEIGHT: 65 IN | SYSTOLIC BLOOD PRESSURE: 136 MMHG | DIASTOLIC BLOOD PRESSURE: 64 MMHG | WEIGHT: 109.13 LBS | HEART RATE: 68 BPM

## 2023-11-02 DIAGNOSIS — M25.569 ACUTE KNEE PAIN, UNSPECIFIED LATERALITY: Primary | ICD-10-CM

## 2023-11-02 DIAGNOSIS — M17.0 PRIMARY OSTEOARTHRITIS OF BOTH KNEES: ICD-10-CM

## 2023-11-02 DIAGNOSIS — M25.569 ACUTE KNEE PAIN, UNSPECIFIED LATERALITY: ICD-10-CM

## 2023-11-02 PROCEDURE — 73562 X-RAY EXAM OF KNEE 3: CPT | Mod: 26,LT,, | Performed by: RADIOLOGY

## 2023-11-02 PROCEDURE — 99999PBSHW PR PBB SHADOW TECHNICAL ONLY FILED TO HB: Mod: PBBFAC,,,

## 2023-11-02 PROCEDURE — 20610 PR DRAIN/INJECT LARGE JOINT/BURSA: ICD-10-PCS | Mod: S$PBB,LT,, | Performed by: PHYSICIAN ASSISTANT

## 2023-11-02 PROCEDURE — 73560 X-RAY EXAM OF KNEE 1 OR 2: CPT | Mod: TC,RT

## 2023-11-02 PROCEDURE — 99999 PR PBB SHADOW E&M-EST. PATIENT-LVL V: CPT | Mod: PBBFAC,,, | Performed by: PHYSICIAN ASSISTANT

## 2023-11-02 PROCEDURE — 73560 X-RAY EXAM OF KNEE 1 OR 2: CPT | Mod: 26,RT,, | Performed by: RADIOLOGY

## 2023-11-02 PROCEDURE — 99214 OFFICE O/P EST MOD 30 MIN: CPT | Mod: S$PBB,25,, | Performed by: PHYSICIAN ASSISTANT

## 2023-11-02 PROCEDURE — 73562 X-RAY EXAM OF KNEE 3: CPT | Mod: TC,LT

## 2023-11-02 PROCEDURE — 73560 XR KNEE ORTHO LEFT: ICD-10-PCS | Mod: 26,RT,, | Performed by: RADIOLOGY

## 2023-11-02 PROCEDURE — 99214 PR OFFICE/OUTPT VISIT, EST, LEVL IV, 30-39 MIN: ICD-10-PCS | Mod: S$PBB,25,, | Performed by: PHYSICIAN ASSISTANT

## 2023-11-02 PROCEDURE — 99999PBSHW PR PBB SHADOW TECHNICAL ONLY FILED TO HB: ICD-10-PCS | Mod: PBBFAC,,,

## 2023-11-02 PROCEDURE — 20610 DRAIN/INJ JOINT/BURSA W/O US: CPT | Mod: PBBFAC | Performed by: PHYSICIAN ASSISTANT

## 2023-11-02 PROCEDURE — 99215 OFFICE O/P EST HI 40 MIN: CPT | Mod: PBBFAC | Performed by: PHYSICIAN ASSISTANT

## 2023-11-02 PROCEDURE — 73562 XR KNEE ORTHO LEFT: ICD-10-PCS | Mod: 26,LT,, | Performed by: RADIOLOGY

## 2023-11-02 PROCEDURE — 20610 DRAIN/INJ JOINT/BURSA W/O US: CPT | Mod: S$PBB,LT,, | Performed by: PHYSICIAN ASSISTANT

## 2023-11-02 PROCEDURE — 99999 PR PBB SHADOW E&M-EST. PATIENT-LVL V: ICD-10-PCS | Mod: PBBFAC,,, | Performed by: PHYSICIAN ASSISTANT

## 2023-11-02 RX ORDER — BETAMETHASONE SODIUM PHOSPHATE AND BETAMETHASONE ACETATE 3; 3 MG/ML; MG/ML
6 INJECTION, SUSPENSION INTRA-ARTICULAR; INTRALESIONAL; INTRAMUSCULAR; SOFT TISSUE
Status: COMPLETED | OUTPATIENT
Start: 2023-11-02 | End: 2023-11-02

## 2023-11-02 RX ADMIN — BETAMETHASONE SODIUM PHOSPHATE AND BETAMETHASONE ACETATE 6 MG: 3; 3 INJECTION, SUSPENSION INTRA-ARTICULAR; INTRALESIONAL; INTRAMUSCULAR at 09:11

## 2023-11-02 NOTE — PROGRESS NOTES
SUBJECTIVE:     Chief Complaint & History of Present Illness:  Sussy Costa is a New patient 88 y.o. female who is seen here today with a complaint of    Chief Complaint   Patient presents with    Left Knee - Pain, Injury    .  Patient is here today for evaluation treatment of sudden onset pain soreness in the lateral aspect of the left knee with radiation down the calf.  States she did suffer 2 falls in the past few weeks 1 she landed on her backside after shooting pain in her knee that caused her to give way and lose her balance brace her 2nd fall she landed onto the knee itself did not have any significant swelling or loss of function but was tender and sore to palpation and had some tenderness with weight-bearing for few days.  She is able to ambulate now without assistance swelling has returned to baseline.  On a scale of 1-10, with 10 being worst pain imaginable, he rates this pain as 3 on good days and 6 on bad days.  she describes the pain as sore.    Review of patient's allergies indicates:   Allergen Reactions    Aspirin Other (See Comments)     Asthma    TRIAD OF NASAL POLYPS, ASA  ALLERGY AND ASTHMA.        Aspirin Other (See Comments)    Nsaids (non-steroidal anti-inflammatory drug) Other (See Comments)     AVOID DUE TO TRIAD OF ASA ALLERGY, NASAL POLYPS,AND ASTHMA  AVOID DUE TO TRIAD OF ASA ALLERGY, NASAL POLYPS,AND ASTHMA    Penicillin      Other reaction(s): Rash    20: tolerates ceftriaxone    Penicillin g Other (See Comments)     Other reaction(s): Rash    20: tolerates ceftriaxone         Current Outpatient Medications   Medication Sig Dispense Refill    ACCU-CHEK GUIDE TEST STRIPS Strp TEST FOUR TIMES DAILY WITH MEALS AND NIGHTLY 200 strip 11    atorvastatin (LIPITOR) 20 MG tablet Take 1 tablet (20 mg total) by mouth every evening. 90 tablet 0    budesonide 1 mg/2 mL NbSp SMARTSI Vial(s) Both Nares Twice Daily      chlorthalidone (HYGROTEN) 25 MG Tab Take 1 tablet (25 mg total)  by mouth once daily. 30 tablet 11    diclofenac sodium (VOLTAREN) 1 % Gel Apply 2 g topically 4 (four) times daily. 100 g 2    fluticasone-umeclidin-vilanter (TRELEGY ELLIPTA) 200-62.5-25 mcg inhaler Inhale 1 puff into the lungs once daily. 3 each 4    gabapentin (NEURONTIN) 100 MG capsule Take 1 capsule (100 mg total) by mouth 3 (three) times daily. 30 capsule 1    hydrocortisone 2.5 % cream Apply topically 2 (two) times daily. For one week only 20 g 1    insulin aspart U-100 (NOVOLOG FLEXPEN U-100 INSULIN) 100 unit/mL (3 mL) InPn pen Inject 4 Units into the skin before breakfast AND 5 Units daily with lunch AND 4 Units daily with dinner or evening meal. Plus correction scale. Max TDD 30 units.. 30 mL 3    insulin detemir U-100, Levemir, (LEVEMIR FLEXTOUCH U-100 INSULN) 100 unit/mL (3 mL) InPn pen Inject 5 Units into the skin 2 (two) times daily. 15 mL 3    irbesartan (AVAPRO) 300 MG tablet Take 1 tablet (300 mg total) by mouth every evening. 90 tablet 3    lancets (ACCU-CHEK FASTCLIX LANCET DRUM) [facility] [place] [place] CHECK BLOOD GLUCOSE FOUR TIMES DAILY 200 each 3    latanoprost 0.005 % ophthalmic solution Inject into the eye. 1 Drops Ophthalmic Every evening      levocetirizine (XYZAL) 5 MG tablet Take 1 tablet (5 mg total) by mouth every evening. 30 tablet 11    lipase-protease-amylase 24,000-76,000-120,000 units (CREON) 24,000-76,000 -120,000 unit capsule Take 2 capsules with meals orally and 1 capsule with snacks. 270 capsule 11    magnesium oxide (MAG-OX) 400 mg (241.3 mg magnesium) tablet Take 400 mg by mouth once daily.      montelukast (SINGULAIR) 10 mg tablet Take 1 tablet (10 mg total) by mouth every evening. 30 tablet 6    NEILMED SINUS RINSE REFILL Pack use as directed      nitrofurantoin, macrocrystal-monohydrate, (MACROBID) 100 MG capsule Take 1 capsule (100 mg total) by mouth 2 (two) times daily. 14 capsule 0    olopatadine (PATANOL) 0.1 % ophthalmic solution Place 1 drop into both eyes 2 (two) times daily as  "needed. 1 Drops Ophthalmic Twice a day       pen needle, diabetic 31 gauge x 5/16" Ndle Use with insulin pen 5 times a day 200 each 11    vitamin D (VITAMIN D3) 1000 units Tab Take 1,000 Units by mouth once daily.      albuterol (PROVENTIL) 2.5 mg /3 mL (0.083 %) nebulizer solution Take 3 mLs (2.5 mg total) by nebulization every 6 (six) hours as needed for Wheezing or Shortness of Breath. Rescue 60 each 11    benralizumab (FASENRA PEN) 30 mg/mL AtIn Inject 30 mg into the skin As instructed. Every 28 days for the first three doses 1 mL 2    benralizumab (FASENRA PEN) 30 mg/mL AtIn Inject 30 mg into the skin As instructed. Every 8 weeks 1 mL 11    blood sugar diagnostic Strp 1 strip by Misc.(Non-Drug; Combo Route) route 4 (four) times daily with meals and nightly. 200 strip 11    LIDOcaine 3 % Crea Apply 1 application topically 2 (two) times a day. 85 g 3    olopatadine (PATADAY) 0.2 % Drop Place into both eyes.      olopatadine (PATADAY) 0.2 % Drop Apply 1 drop to eye daily as needed.       No current facility-administered medications for this visit.       Past Medical History:   Diagnosis Date    Asthma     Cyst of pancreas     liver    Diabetes mellitus type II     Eczema of hand     Glaucoma     History of recurrent pneumonia 9/28/2020    Hyperlipidemia     Hypertension     Lichen planus     gums    Osteoporosis     Pulmonary nodules        Past Surgical History:   Procedure Laterality Date    BRONCHOSCOPY N/A 5/13/2022    Procedure: Bronchoscopy;  Surgeon: [facility] Diagnostic Provider;  Location: [facility] OR [address] [place] [address];  Service: Anesthesiology;  Laterality: N/A;    CATARACT EXTRACTION, BILATERAL      CHOLECYSTECTOMY      COLECTOMY, RIGHT Right 10/25/2022    Procedure: COLECTOMY, RIGHT;  Surgeon: Jass Holman MD;  Location: [facility] OR [address] [place] [address];  Service: Colon and Rectal;  Laterality: Right;    COLONOSCOPY N/A 6/26/2023    Procedure: COLONOSCOPY;  Surgeon: Jass Holman MD;  Location: [facility] ([address] [place] [address]);  Service: " Endoscopy;  Laterality: N/A;  2nd floor -lung disease  referral Dr MartinezGpoqflo-ansx-wecan portal/mailed-GT  6/20/23- Precall confirmed- [place]    ENDOSCOPIC ULTRASOUND OF UPPER GASTROINTESTINAL TRACT N/A 4/22/2022    Procedure: ULTRASOUND, UPPER GI TRACT, ENDOSCOPIC;  Surgeon: Max Rosado MD;  Location: [facility] ENDO ([address] [place] [address]);  Service: Endoscopy;  Laterality: N/A;  urgent EUS (linear) for abnormal CT scan with dilated PD and increased cyst of pancreas cyst.  pap 44 mmHg  4/13:fully vaccinated. instructions via portal-[facility]    EPIDURAL STEROID INJECTION INTO LUMBAR SPINE N/A 11/8/2018    Procedure: Injection-steroid-epidural-lumbar L5-S1;  Surgeon: Nicci Osorio Jr., MD;  Location: [facility] PAIN MGT;  Service: Pain Management;  Laterality: N/A;    FUNCTIONAL ENDOSCOPIC SINUS SURGERY (FESS) USING COMPUTER-ASSISTED NAVIGATION N/A 6/17/2019    Procedure: FESS, USING COMPUTER-ASSISTED NAVIGATION;  Surgeon: Rosangela Isabel MD;  Location: [facility] OR;  Service: ENT;  Laterality: N/A;  video    HYSTERECTOMY      nasal polyps         Vital Signs (Most Recent)  Vitals:    11/02/23 0829   BP: 136/64   Pulse: 68           Review of Systems:  ROS:  Constitutional: no fever or chills  Eyes: no visual changes,  Positive for glaucoma  ENT: no nasal congestion or sore throat  Respiratory:  Positive for moderate persistent asthma  Cardiovascular: no chest pain or palpitations, hypertension, diastolic dysfunction, AFib, aortic atherosclerosis   Gastrointestinal: no nausea or vomiting, tolerating diet, positive colon cancer, pancreatic cyst, pancreatic insufficiency  Genitourinary: no hematuria or dysuria  Integument/Breast: no rash or pruritis  Hematologic/Lymphatic: no easy bruising or lymphadenopathy, carcinoid bronchial adenoma  Musculoskeletal: no arthralgias or myalgias  Neurological: no seizures or tremors, lumbar spinal stenosis, spondylosis  Behavioral/Psych: no auditory or visual hallucinations  Endocrine: no heat or cold  "intolerance                OBJECTIVE:     PHYSICAL EXAM:  Height: 5' 5" (165.1 cm) Weight: 49.5 kg (109 lb 2 oz), General Appearance: Well nourished, well developed, in no acute distress.  Neurological: Mood & affect are normal.    left  Knee Exam:  Knee Range of Motion:0-125 degrees flexion   Effusion:none  Condition of skin:intact  Location of tenderness:Lateral joint line   Strength:limited by pain and 5 of 5  Stability:  Lachman: stable, LCL: stable, MCL: stable, PCL: stable, and posteromedial (dial): stable  Varus /Valgus stress:  normal  Emelia:   negative/negative    right  Knee Exam:  Knee Range of Motion:0-125 degrees flexion   Effusion:none  Condition of skin:intact  Location of tenderness:None   Strength:5 of 5  Stability:  Lachman: stable, LCL: stable, MCL: stable, PCL: stable, and posteromedial (dial): stable  Varus /Valgus stress:  normal  Emelia:   negative/negative      Hip Examination:  full painless range of motion, without tenderness    RADIOGRAPHS:  X-rays of the knees taken today films reviewed by me demonstrate moderate arthritic changes throughout both knees with almost complete loss of medial joint space bilaterally sclerotic changes and osteophytic spurring noted tricompartmentally mild-to-moderate chondromalacia bilaterally.  No evidence of fracture dislocation or other bony abnormality    ASSESSMENT/PLAN:       ICD-10-CM ICD-9-CM   1. Acute knee pain, unspecified laterality  M25.569 719.46       Plan: We discussed with the patient at length all the different treatment options available for  the knee including anti-inflammatories, acetaminophen, rest, ice, knee strengthening exercise, occasional cortisone injections for temporary relief, Viscosupplimentation injections, arthroscopic debridement osteotomy, and finally knee arthroplasty.   Will proceed with therapeutic diagnostic cortisone injection of the left knee     The risks, benefits, pros, cons, and potential side effects of the " procedure were discussed with the patient in detail all questions were answered.  The patient is comfortable and willing to proceed with the procedure. Verbal consent was obtained and the proper joint was identified by the patient and provider        The injection site was identified and the skin was prepared with a betadine solution. The   left  knee was injected with 1 ml of Celestone and 5 ml Lidocaine under sterile technique. Sussy Costa tolerated the procedure well, she was advised to rest the knee today, ice and elevation. she did receive immediate relief of the pain in and about his knee she was told this would be short lived and is secondary to the lidocaine. she may have an increase in his discomfort tonight followed by steady improvement over the next several days. I may take 1-3 weeks following the injection to get the full benefit of the medication.  I will see her back in 4-6 months. Sooner if he has any problems or concerns.

## 2023-11-06 ENCOUNTER — HOSPITAL ENCOUNTER (OUTPATIENT)
Dept: RADIOLOGY | Facility: HOSPITAL | Age: 88
Discharge: HOME | End: 2023-11-06
Attending: HOSPITALIST
Payer: MEDICARE

## 2023-11-06 DIAGNOSIS — C18.9 MALIGNANT NEOPLASM OF COLON, UNSPECIFIED PART OF COLON: ICD-10-CM

## 2023-11-06 DIAGNOSIS — C7A.090 CARCINOID BRONCHIAL ADENOMA, RIGHT: ICD-10-CM

## 2023-11-06 PROCEDURE — 74177 CT CHEST ABDOMEN PELVIS WITH CONTRAST (XPD): ICD-10-PCS | Mod: 26,,, | Performed by: RADIOLOGY

## 2023-11-06 PROCEDURE — 74177 CT ABD & PELVIS W/CONTRAST: CPT | Mod: 26,,, | Performed by: RADIOLOGY

## 2023-11-06 PROCEDURE — 25500020 PHARM REV CODE 255: Performed by: HOSPITALIST

## 2023-11-06 PROCEDURE — 71260 CT THORAX DX C+: CPT | Mod: TC

## 2023-11-06 PROCEDURE — A9698 NON-RAD CONTRAST MATERIALNOC: HCPCS | Performed by: HOSPITALIST

## 2023-11-06 PROCEDURE — 71260 CT THORAX DX C+: CPT | Mod: 26,,, | Performed by: RADIOLOGY

## 2023-11-06 PROCEDURE — 71260 CT CHEST ABDOMEN PELVIS WITH CONTRAST (XPD): ICD-10-PCS | Mod: 26,,, | Performed by: RADIOLOGY

## 2023-11-06 RX ADMIN — IOHEXOL 75 ML: 350 INJECTION, SOLUTION INTRAVENOUS at 12:11

## 2023-11-06 RX ADMIN — Medication 450 ML: at 12:11

## 2023-11-07 ENCOUNTER — HOSPITAL ENCOUNTER (OUTPATIENT)
Dept: RADIOLOGY | Facility: HOSPITAL | Age: 88
Discharge: HOME | End: 2023-11-07
Attending: NURSE PRACTITIONER
Payer: MEDICARE

## 2023-11-07 DIAGNOSIS — M81.0 SENILE OSTEOPOROSIS: ICD-10-CM

## 2023-11-07 PROCEDURE — 77080 DXA BONE DENSITY AXIAL SKELETON 1 OR MORE SITES: ICD-10-PCS | Mod: 26,,, | Performed by: INTERNAL MEDICINE

## 2023-11-07 PROCEDURE — 77080 DXA BONE DENSITY AXIAL: CPT | Mod: 26,,, | Performed by: INTERNAL MEDICINE

## 2023-11-07 PROCEDURE — 77080 DXA BONE DENSITY AXIAL: CPT | Mod: TC

## 2023-11-10 ENCOUNTER — OFFICE VISIT (OUTPATIENT)
Dept: HEMATOLOGY/ONCOLOGY | Facility: CLINIC | Age: 88
End: 2023-11-10
Payer: MEDICARE

## 2023-11-10 VITALS
SYSTOLIC BLOOD PRESSURE: 127 MMHG | OXYGEN SATURATION: 99 % | WEIGHT: 110.88 LBS | BODY MASS INDEX: 18.47 KG/M2 | HEIGHT: 65 IN | DIASTOLIC BLOOD PRESSURE: 61 MMHG | HEART RATE: 61 BPM

## 2023-11-10 DIAGNOSIS — D50.0 ANEMIA DUE TO CHRONIC BLOOD LOSS: ICD-10-CM

## 2023-11-10 DIAGNOSIS — E11.29 TYPE 2 DIABETES MELLITUS WITH MICROALBUMINURIA, WITHOUT LONG-TERM CURRENT USE OF INSULIN: ICD-10-CM

## 2023-11-10 DIAGNOSIS — Z85.038 HISTORY OF COLON CANCER: Primary | ICD-10-CM
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
## 2023-11-10 DIAGNOSIS — I10 PRIMARY HYPERTENSION: ICD-10-CM

## 2023-11-10 DIAGNOSIS — R80.9 TYPE 2 DIABETES MELLITUS WITH MICROALBUMINURIA, WITHOUT LONG-TERM CURRENT USE OF INSULIN: ICD-10-CM

## 2023-11-10 DIAGNOSIS — C7A.090 CARCINOID BRONCHIAL ADENOMA, RIGHT: ICD-10-CM

## 2023-11-10 PROCEDURE — 99215 PR OFFICE/OUTPT VISIT, EST, LEVL V, 40-54 MIN: ICD-10-PCS | Mod: S$PBB,,, | Performed by: HOSPITALIST

## 2023-11-10 PROCEDURE — 99213 OFFICE O/P EST LOW 20 MIN: CPT | Mod: PBBFAC | Performed by: HOSPITALIST

## 2023-11-10 PROCEDURE — 99999 PR PBB SHADOW E&M-EST. PATIENT-LVL III: CPT | Mod: PBBFAC,,, | Performed by: HOSPITALIST

## 2023-11-10 PROCEDURE — 99215 OFFICE O/P EST HI 40 MIN: CPT | Mod: S$PBB,,, | Performed by: HOSPITALIST

## 2023-11-10 PROCEDURE — 99999 PR PBB SHADOW E&M-EST. PATIENT-LVL III: ICD-10-PCS | Mod: PBBFAC,,, | Performed by: HOSPITALIST

## 2023-11-10 RX ORDER — CHLORTHALIDONE 25 MG/1
25 TABLET ORAL DAILY
Qty: 90 TABLET | Refills: 3 | Status: SHIPPED | OUTPATIENT
Start: 2023-11-10 | End: 2024-02-12 | Stop reason: SDUPTHER

## 2023-11-10 NOTE — ASSESSMENT & PLAN NOTE
- RICKY on most recent scans and colonoscopy  - Continue surveillance  - FU in clinic in 3 months ;CT scan in 6 months

## 2023-11-10 NOTE — PROGRESS NOTES
MEDICAL ONCOLOGY FOLLOW-UP VISIT.     Best Contact Phone Number(s): [phone] (home)      Cancer/Stage/TNM:    Cancer Staging   History of colon cancer  Staging form: Colon and Rectum, AJCC 8th Edition  - Clinical: Stage IIB (cT4a, cN0, cM0) - Signed by Nakul English MD on 2/3/2023       Reason for visit:  Stage II CRC on surveillance    HPI: Sussy Costa is a 88 y.o. female with asthma, recurrent PNA and hypoxic respiratory failure, and DM2 who was hospitalized 10/2022 for large bowel obstruction. 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  She deferred adjuvant chemotherapy for her high risk stage II CRC.  She presents to medical oncology clinic for routine surveillance.      Interval History:     Underwent colonoscopy 6/26/23. Normal with healthy anastamosisa and no polyps. No recommendation to repeat. CT 11/6/23 - RICKY.    Two falls over the last few months in the setting of left knee weakness (knee just gave out). Had steroid injection in his left knee per orthopedics. No headstrike. Otherwise feeling well.    Appetite is good. Normal bowel movements up to twice per day. Soft and no blood. Weight is up to 110pounds.     Oncology History   History of colon cancer   10/25/2022 Surgery    Right hemicolectomy    Moderately differentiated MMR proficient adenocarcinoma invading into the visceral peritoneum with associated PNI, LVI, and high tumor budding. Negative margins. 0/18 LN. [id number]     10/25/2022 Imaging Significant Findings    CT a/p with contrast:     1. Circumferential wall thickening of the short segment of ascending colon and apple-core appearance with pericolic fat stranding and free fluid, resulting in significant mass effect and dilatation of the cecum up to measuring 10 cm.  Suspect obstructing ascending  colon neoplasm.    2. Diffuse gastric wall thickening and enhancement suggesting gastritis.  3. Stable innumerable pancreatic hypodense lesion.  4. Pulmonary nodules similar in size and number in comparison prior.  Metastasis not excluded.  5. Multiple hypodense lesions in the liver and a few in the spleen as well, similar to prior.  Metastasis not excluded.       12/7/2022 Imaging Significant Findings    CT chest:    1. Following resolution of left inter lobar and lower lobe bronchi mucous plugging, there is resolution of previous left lower lobe atelectasis.  However, there are many bilateral subcentimeter pulmonary nodules in both lower lobes which appear increased since a CT of the abdomen and pelvis 10/25/2022 in which there is partial imaging of the chest base.  There is a new 1.4 cm right lower lobe nodule.  2. Interval development of small volume right-sided pleural effusion.  This report was flagged in Epic as abnormal.     12/19/2022 Imaging Significant Findings    MR abdomen without evidence of intrabdominal malignancy. Ongoing chronic pancreatitis     1/24/2023 Biopsy    Biopsy of right lower lube nodule : Typical carcinoid. No mitoses noted.     2/15/2023 Notable Event      Seen by rad onc (Dr. Zamorano) 02/15/23 regarding the carcinoid tumor in the lung- imaging felt consistent with an indolent carcinoid tumor over the last decade with more recent inflammatory nodule. Plan for ongoing surveillance.     5/1/2023 Imaging Significant Findings    CT torso: Improvement in the 1.4cm RLL nodule. This nodule was not biopsied, and was though possible more inflammatory in nature. Other subcentimeter nodules remain stable. No evidence of disease progression. No other evidence of recurrent or persistent colon cancer.     6/26/2023 Procedure    Colonoscopy  Impression:      - Patent side-to-side ileo-colonic anastomosis,                          characterized by healthy appearing mucosa.                          -  "Diverticulosis in the sigmoid colon.                          - Internal hemorrhoids.                          - No specimens collected.       Imaging Significant Findings    CT CAP  Impression:  1. Postoperative change prior right hemicolectomy without CT evidence of residual/recurrent disease.  2. Numerous scattered ground-glass and solid pulmonary nodules, not significantly changed.  No appreciable new or enlarging pulmonary nodules.  3. Cystic lesions within the pancreas with persistent dilatation of the main pancreatic duct, not significantly changed.            Physical Exam:   /61 (BP Location: Right arm, Patient Position: Sitting, BP Method: Medium (Automatic))   Pulse 61   Ht 5' 5" (1.651 m)   Wt 50.3 kg (110 lb 14.3 oz)   SpO2 99%   BMI 18.45 kg/m²      ECOG Performance Status: (foot note - ECOG PS provided by [employer]cology [employer]) 1 - Symptomatic but completely ambulatory    Physical Exam  Constitutional:       General: She is not in acute distress.     Appearance: She is normal weight.   HENT:      Head: Normocephalic.      Mouth/Throat:      Mouth: Mucous membranes are moist.      Pharynx: Oropharynx is clear.   Eyes:      General: No scleral icterus.     Conjunctiva/sclera: Conjunctivae normal.      Pupils: Pupils are equal, round, and reactive to light.   Cardiovascular:      Rate and Rhythm: Normal rate and regular rhythm.      Heart sounds: No murmur heard.  Pulmonary:      Effort: Pulmonary effort is normal. No respiratory distress.      Breath sounds: Normal breath sounds.   Abdominal:      General: There is no distension.      Palpations: Abdomen is soft.   Musculoskeletal:         General: Normal range of motion.      Cervical back: Normal range of motion and neck supple.      Right lower leg: No edema.      Left lower leg: No edema.   Lymphadenopathy:      Cervical: No cervical adenopathy.   Skin:     General: Skin is warm.      Coloration: Skin is not jaundiced. "   Neurological:      General: No focal deficit present.      Mental Status: She is alert and oriented to person, place, and time.      Motor: No weakness.   Psychiatric:         Mood and Affect: Mood normal.         Behavior: Behavior normal.         Thought Content: Thought content normal.           Labs:   Lab Results   Component Value Date     11/06/2023    K 3.9 11/06/2023    CREATININE 1.3 11/06/2023    WBC 11.02 11/06/2023    HGB 10.8 (L) 11/06/2023     11/06/2023    AST 28 11/06/2023    ALT 43 11/06/2023    ALKPHOS 52 (L) 11/06/2023    BILITOT 0.4 11/06/2023          Imaging:     DXA Bone Density Axial Skeleton 1 or more sites  Narrative: EXAMINATION:  DXA BONE DENSITY AXIAL SKELETON 1 OR MORE SITES    CLINICAL HISTORY:  Age-related osteoporosis without current pathological fracture.  87 y/o female with no history of fractures.  She had surgical menopause at 56 y/o.  Pt's Mother had a hip fracture.  Pt is taking Vit D and Prolia.  She has a history of Asthma, Diabetes and Colon Ca.  Pt exercise regularly and does not smoke.    TECHNIQUE:  DXA specification: [facility] (S/N [id number])    Bone Mineral Density scanning was performed over the hip and lumbar spine.    Review of the images confirms satisfactory positioning and technique.    COMPARISON:  Comparison study done on 11/05/2021.    Lumbar spine:     BMD 0.830 g/cm2 and T-score -2.0.    Total Hip:           BMD 0.599 g/cm2 and T-score -2.8.    Distal 1/3 radius: Not applicable    FINDINGS:  Lumbar spine (L1-L4):               BMD is 0.831 g/cm2, T-score is -2.0, and Z-score is 0.9.    Total hip:                                BMD is 0.551 g/cm2, T-score is -3.2, and Z-score is -0.9.    Femoral neck:                          BMD is 0.489 g/cm2, T-score is -3.2, and Z-score is -0.7.    Distal 1/3 radius:                      Not applicable    FRAX:    35% risk of a major osteoporotic fracture in the next 10 years.    28% risk of  hip fracture in the next 10 years.  Impression: *Osteoporosis on treatment with Denosumab.  *Fracture risk is very high due to calculated 10 year risk of hip fracture >4.5% (FRAX)  *Compared with previous DXA, BMD at the lumbar spine has remained stable, and BMD at the total hip has declined by 8.2%.    RECOMMENDATIONS:  *Daily calcium intake 1200 mg, dietary sources preferred; Vitamin D 800-1000  *Weight bearing exercise and fall precautions.  *Given very high fracture risk, and significant decline in the hip BMD on denosumab would consider a 12 month treatment course of romosozumab followed by resumption of denosumab.  Depending on contraindications.  *Repeat BMD in 2 years  *If not recently completed, would recommend lateral thoracic and lumbar x-rays looking for prevalent fractures.    EXPLANATION OF RESULTS:  T-score compares these results to the average bone density of a 20-29 year-old of the same gender.    Z-score compares this result to the average bone density to people of the same age, gender, and race.    The amounts indicate the number of standard deviations above or below the mean.    * Osteoporosis is generally defined as having a T-score between less than or equal to -2.5.    * Low bone mass (osteopenia) is generally defined as having a T-score between -1.0 and -2.5.    * The normal range is generally defined as having a T-score greater than or equal to -1.0.    * Calculated FRAX scores for fracture risk prediction may not be accurate in the setting of certain clinical factors such as pharmacologic therapy for osteoporosis, prior fragility fractures, high dose glucocorticoid use.    Electronically signed by: Kristian Erickson  Date:    11/07/2023  Time:    16:11            Diagnoses:       1. History of colon cancer    2. Carcinoid bronchial adenoma, right    3. Fall, initial encounter    4. Type 2 diabetes mellitus with microalbuminuria, without long-term current use of insulin    5. Primary  hypertension    6. Anemia due to chronic blood loss                  Assessment and Plan:     1. History of colon cancer  Overview:  Right sided colon cancer diagnosed in setting of LBO requiring urgent hemicolectomy 10/25/2022 with path showing pT4aN0 disease. CT chest 12/7/22 with possible lung metastases and MR liver with indeterminate liver lesions. 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 Patient elected for surveillance.      Assessment & Plan:  - RICKY on most recent scans and colonoscopy  - Continue surveillance  - FU in clinic in 3 months ;CT scan in 6 months      2. Carcinoid bronchial adenoma, right  Overview:  Incidentally noted on imaging Undergoing surveillance 12/2022 and biopsy proven 01/2023. On imaging review appears indolent. Plan for surveillance    Assessment & Plan:  - Stable  - Con't q6 month monitoring      3. Fall, initial encounter  -     Ambulatory referral/consult to Physical/Occupational Therapy; Future; Expected date: 11/17/2023    4. Type 2 diabetes mellitus with microalbuminuria, without long-term current use of insulin  Overview:  Home regimen includes levemir and Novolog. Last A1c 6.9 in 11/2022    Assessment & Plan:  - Continues on levemir along with ISS.  - More difficulty to control since her knee injection      5. Primary hypertension  Overview:  Home medications include chlorthalidone and irbesartan with every other day lasix    Orders:  -     chlorthalidone (HYGROTEN) 25 MG Tab; Take 1 tablet (25 mg total) by mouth once daily.  Dispense: 90 tablet; Refill: 3    6. Anemia due to chronic blood loss  -     IRON AND TIBC; Future; Expected date: 11/10/2023  -     FERRITIN; Future; Expected date: 11/10/2023                   Route Chart for Scheduling    Med Onc Chart Routing      Follow up with physician 3 months.   Follow up with DOMONIQUE    Infusion scheduling note    Injection scheduling note     Labs Iron and TIBC, CBC, CMP and CEA   Scheduling:  Preferred lab:  Lab interval:  ferritin   Imaging    Pharmacy appointment    Other referrals                    Therapy Plan Information  Medications  denosumab (PROLIA) injection 60 mg  60 mg, Subcutaneous, Every 26 weeks       Nakul English MD  Hematology/Oncology  [facility] - [facility]

## 2023-11-18 ENCOUNTER — HOSPITAL ENCOUNTER (INPATIENT)
Facility: HOSPITAL | Age: 88
LOS: 2 days | Discharge: HOME | DRG: 871 | End: 2023-11-20
Attending: STUDENT IN AN ORGANIZED HEALTH CARE EDUCATION/TRAINING PROGRAM | Admitting: FAMILY MEDICINE
Payer: MEDICARE

## 2023-11-18 DIAGNOSIS — J44.1 COPD EXACERBATION: ICD-10-CM

## 2023-11-18 DIAGNOSIS — J18.9 PNEUMONIA OF BOTH LUNGS DUE TO INFECTIOUS ORGANISM, UNSPECIFIED PART OF LUNG: Primary | ICD-10-CM

## 2023-11-18 DIAGNOSIS — J18.9 PNEUMONIA OF LEFT LOWER LOBE DUE TO INFECTIOUS ORGANISM: ICD-10-CM

## 2023-11-18 DIAGNOSIS — R52 PAIN: ICD-10-CM

## 2023-11-18 DIAGNOSIS — R07.9 CHEST PAIN: ICD-10-CM

## 2023-11-18 DIAGNOSIS — R05.9 COUGH: ICD-10-CM

## 2023-11-18 PROBLEM — R10.9 ABDOMINAL PAIN: Status: ACTIVE | Noted: 2023-11-18

## 2023-11-18 LAB
ALBUMIN SERPL BCP-MCNC: 3.1 G/DL (ref 3.5–5.2)
ALP SERPL-CCNC: 60 U/L (ref 55–135)
ALT SERPL W/O P-5'-P-CCNC: 19 U/L (ref 10–44)
ANION GAP SERPL CALC-SCNC: 13 MMOL/L (ref 8–16)
AST SERPL-CCNC: 18 U/L (ref 10–40)
BACTERIA #/AREA URNS AUTO: ABNORMAL /HPF
BASOPHILS # BLD AUTO: 0.05 K/UL (ref 0–0.2)
BASOPHILS NFR BLD: 0.4 % (ref 0–1.9)
BILIRUB SERPL-MCNC: 0.6 MG/DL (ref 0.1–1)
BILIRUB UR QL STRIP: NEGATIVE
BUN SERPL-MCNC: 38 MG/DL (ref 8–23)
CALCIUM SERPL-MCNC: 9.6 MG/DL (ref 8.7–10.5)
CHLORIDE SERPL-SCNC: 108 MMOL/L (ref 95–110)
CLARITY UR REFRACT.AUTO: ABNORMAL
CO2 SERPL-SCNC: 21 MMOL/L (ref 23–29)
COLOR UR AUTO: YELLOW
CREAT SERPL-MCNC: 1.5 MG/DL (ref 0.5–1.4)
DIFFERENTIAL METHOD: ABNORMAL
EOSINOPHIL # BLD AUTO: 0.2 K/UL (ref 0–0.5)
EOSINOPHIL NFR BLD: 1.4 % (ref 0–8)
ERYTHROCYTE [DISTWIDTH] IN BLOOD BY AUTOMATED COUNT: 12.9 % (ref 11.5–14.5)
EST. GFR  (NO RACE VARIABLE): 33.5 ML/MIN/1.73 M^2
GLUCOSE SERPL-MCNC: 145 MG/DL (ref 70–110)
GLUCOSE UR QL STRIP: ABNORMAL
HCT VFR BLD AUTO: 35.8 % (ref 37–48.5)
HGB BLD-MCNC: 11.9 G/DL (ref 12–16)
HGB UR QL STRIP: NEGATIVE
IMM GRANULOCYTES # BLD AUTO: 0.05 K/UL (ref 0–0.04)
IMM GRANULOCYTES NFR BLD AUTO: 0.4 % (ref 0–0.5)
INFLUENZA A, MOLECULAR: NEGATIVE
INFLUENZA B, MOLECULAR: NEGATIVE
KETONES UR QL STRIP: NEGATIVE
LACTATE SERPL-SCNC: 2.3 MMOL/L (ref 0.5–2.2)
LEUKOCYTE ESTERASE UR QL STRIP: ABNORMAL
LIPASE SERPL-CCNC: <4 U/L (ref 4–60)
LYMPHOCYTES # BLD AUTO: 1.3 K/UL (ref 1–4.8)
LYMPHOCYTES NFR BLD: 9.3 % (ref 18–48)
MCH RBC QN AUTO: 32.1 PG (ref 27–31)
MCHC RBC AUTO-ENTMCNC: 33.2 G/DL (ref 32–36)
MCV RBC AUTO: 97 FL (ref 82–98)
MICROSCOPIC COMMENT: ABNORMAL
MONOCYTES # BLD AUTO: 1.3 K/UL (ref 0.3–1)
MONOCYTES NFR BLD: 9.4 % (ref 4–15)
NEUTROPHILS # BLD AUTO: 10.6 K/UL (ref 1.8–7.7)
NEUTROPHILS NFR BLD: 79.1 % (ref 38–73)
NITRITE UR QL STRIP: NEGATIVE
NRBC BLD-RTO: 0 /100 WBC
PH UR STRIP: 5 [PH] (ref 5–8)
PLATELET # BLD AUTO: 248 K/UL (ref 150–450)
PMV BLD AUTO: 12.4 FL (ref 9.2–12.9)
POCT GLUCOSE: 114 MG/DL (ref 70–110)
POCT GLUCOSE: 129 MG/DL (ref 70–110)
POTASSIUM SERPL-SCNC: 3.3 MMOL/L (ref 3.5–5.1)
PROCALCITONIN SERPL IA-MCNC: 0.18 NG/ML
PROT SERPL-MCNC: 7.3 G/DL (ref 6–8.4)
PROT UR QL STRIP: NEGATIVE
RBC # BLD AUTO: 3.71 M/UL (ref 4–5.4)
RBC #/AREA URNS AUTO: 1 /HPF (ref 0–4)
SARS-COV-2 RDRP RESP QL NAA+PROBE: NEGATIVE
SODIUM SERPL-SCNC: 142 MMOL/L (ref 136–145)
SP GR UR STRIP: 1.01 (ref 1–1.03)
SPECIMEN SOURCE: NORMAL
SQUAMOUS #/AREA URNS AUTO: 1 /HPF
TROPONIN I SERPL DL<=0.01 NG/ML-MCNC: 0.02 NG/ML (ref 0–0.03)
URN SPEC COLLECT METH UR: ABNORMAL
WBC # BLD AUTO: 13.44 K/UL (ref 3.9–12.7)
WBC #/AREA URNS AUTO: 9 /HPF (ref 0–5)

## 2023-11-18 PROCEDURE — 93010 ELECTROCARDIOGRAM REPORT: CPT | Mod: 76,,, | Performed by: INTERNAL MEDICINE

## 2023-11-18 PROCEDURE — 87040 BLOOD CULTURE FOR BACTERIA: CPT | Performed by: STUDENT IN AN ORGANIZED HEALTH CARE EDUCATION/TRAINING PROGRAM

## 2023-11-18 PROCEDURE — 99223 PR INITIAL HOSPITAL CARE,LEVL III: ICD-10-PCS | Mod: AI,,, | Performed by: FAMILY MEDICINE

## 2023-11-18 PROCEDURE — 63700000 PHARM REV CODE 250 ALT 637 W/O HCPCS: Performed by: FAMILY MEDICINE

## 2023-11-18 PROCEDURE — 25000003 PHARM REV CODE 250: Performed by: FAMILY MEDICINE

## 2023-11-18 PROCEDURE — 11000001 HC ACUTE MED/SURG PRIVATE ROOM

## 2023-11-18 PROCEDURE — U0002 COVID-19 LAB TEST NON-CDC: HCPCS | Performed by: PHYSICIAN ASSISTANT

## 2023-11-18 PROCEDURE — 81001 URINALYSIS AUTO W/SCOPE: CPT | Performed by: PHYSICIAN ASSISTANT

## 2023-11-18 PROCEDURE — 85025 COMPLETE CBC W/AUTO DIFF WBC: CPT | Performed by: PHYSICIAN ASSISTANT

## 2023-11-18 PROCEDURE — 93010 EKG 12-LEAD: ICD-10-PCS | Mod: 76,,, | Performed by: INTERNAL MEDICINE

## 2023-11-18 PROCEDURE — 84484 ASSAY OF TROPONIN QUANT: CPT | Performed by: PHYSICIAN ASSISTANT

## 2023-11-18 PROCEDURE — 99223 1ST HOSP IP/OBS HIGH 75: CPT | Mod: AI,,, | Performed by: FAMILY MEDICINE

## 2023-11-18 PROCEDURE — 93005 ELECTROCARDIOGRAM TRACING: CPT

## 2023-11-18 PROCEDURE — 63600175 PHARM REV CODE 636 W HCPCS: Performed by: FAMILY MEDICINE

## 2023-11-18 PROCEDURE — 83690 ASSAY OF LIPASE: CPT | Performed by: PHYSICIAN ASSISTANT

## 2023-11-18 PROCEDURE — 36415 COLL VENOUS BLD VENIPUNCTURE: CPT | Performed by: FAMILY MEDICINE

## 2023-11-18 PROCEDURE — 63600175 PHARM REV CODE 636 W HCPCS: Performed by: STUDENT IN AN ORGANIZED HEALTH CARE EDUCATION/TRAINING PROGRAM

## 2023-11-18 PROCEDURE — 99285 EMERGENCY DEPT VISIT HI MDM: CPT | Mod: 25

## 2023-11-18 PROCEDURE — 96374 THER/PROPH/DIAG INJ IV PUSH: CPT

## 2023-11-18 PROCEDURE — 83605 ASSAY OF LACTIC ACID: CPT | Performed by: FAMILY MEDICINE

## 2023-11-18 PROCEDURE — 25500020 PHARM REV CODE 255: Performed by: STUDENT IN AN ORGANIZED HEALTH CARE EDUCATION/TRAINING PROGRAM

## 2023-11-18 PROCEDURE — 87502 INFLUENZA DNA AMP PROBE: CPT | Performed by: STUDENT IN AN ORGANIZED HEALTH CARE EDUCATION/TRAINING PROGRAM

## 2023-11-18 PROCEDURE — 25000003 PHARM REV CODE 250: Performed by: STUDENT IN AN ORGANIZED HEALTH CARE EDUCATION/TRAINING PROGRAM

## 2023-11-18 PROCEDURE — 84145 PROCALCITONIN (PCT): CPT | Performed by: FAMILY MEDICINE

## 2023-11-18 PROCEDURE — 80053 COMPREHEN METABOLIC PANEL: CPT | Performed by: PHYSICIAN ASSISTANT

## 2023-11-18 RX ORDER — CHLORTHALIDONE 25 MG/1
25 TABLET ORAL DAILY
Status: DISCONTINUED | OUTPATIENT
Start: 2023-11-19 | End: 2023-11-20 | Stop reason: HOSPADM

## 2023-11-18 RX ORDER — GABAPENTIN 100 MG/1
100 CAPSULE ORAL 3 TIMES DAILY
Status: DISCONTINUED | OUTPATIENT
Start: 2023-11-18 | End: 2023-11-20 | Stop reason: HOSPADM

## 2023-11-18 RX ORDER — FLUTICASONE FUROATE AND VILANTEROL 100; 25 UG/1; UG/1
1 POWDER RESPIRATORY (INHALATION) DAILY
Status: DISCONTINUED | OUTPATIENT
Start: 2023-11-19 | End: 2023-11-20 | Stop reason: HOSPADM

## 2023-11-18 RX ORDER — GLUCAGON 1 MG
1 KIT INJECTION
Status: DISCONTINUED | OUTPATIENT
Start: 2023-11-18 | End: 2023-11-20 | Stop reason: HOSPADM

## 2023-11-18 RX ORDER — NALOXONE HCL 0.4 MG/ML
0.02 VIAL (ML) INJECTION
Status: DISCONTINUED | OUTPATIENT
Start: 2023-11-18 | End: 2023-11-20 | Stop reason: HOSPADM

## 2023-11-18 RX ORDER — ATORVASTATIN CALCIUM 20 MG/1
20 TABLET, FILM COATED ORAL NIGHTLY
Status: DISCONTINUED | OUTPATIENT
Start: 2023-11-18 | End: 2023-11-20 | Stop reason: HOSPADM

## 2023-11-18 RX ORDER — BENZONATATE 100 MG/1
100 CAPSULE ORAL ONCE
Status: COMPLETED | OUTPATIENT
Start: 2023-11-18 | End: 2023-11-18

## 2023-11-18 RX ORDER — IBUPROFEN 200 MG
16 TABLET ORAL
Status: DISCONTINUED | OUTPATIENT
Start: 2023-11-18 | End: 2023-11-20 | Stop reason: HOSPADM

## 2023-11-18 RX ORDER — MAG HYDROX/ALUMINUM HYD/SIMETH 200-200-20
30 SUSPENSION, ORAL (FINAL DOSE FORM) ORAL 4 TIMES DAILY PRN
Status: DISCONTINUED | OUTPATIENT
Start: 2023-11-18 | End: 2023-11-20 | Stop reason: HOSPADM

## 2023-11-18 RX ORDER — TALC
6 POWDER (GRAM) TOPICAL NIGHTLY PRN
Status: DISCONTINUED | OUTPATIENT
Start: 2023-11-18 | End: 2023-11-20 | Stop reason: HOSPADM

## 2023-11-18 RX ORDER — LOSARTAN POTASSIUM 50 MG/1
50 TABLET ORAL DAILY
Status: DISCONTINUED | OUTPATIENT
Start: 2023-11-19 | End: 2023-11-20 | Stop reason: HOSPADM

## 2023-11-18 RX ORDER — MONTELUKAST SODIUM 10 MG/1
10 TABLET ORAL NIGHTLY
Status: DISCONTINUED | OUTPATIENT
Start: 2023-11-18 | End: 2023-11-20 | Stop reason: HOSPADM

## 2023-11-18 RX ORDER — LATANOPROST 50 UG/ML
1 SOLUTION/ DROPS OPHTHALMIC NIGHTLY
Status: DISCONTINUED | OUTPATIENT
Start: 2023-11-18 | End: 2023-11-20 | Stop reason: HOSPADM

## 2023-11-18 RX ORDER — BENZONATATE 100 MG/1
100 CAPSULE ORAL 3 TIMES DAILY PRN
Status: DISCONTINUED | OUTPATIENT
Start: 2023-11-18 | End: 2023-11-20 | Stop reason: HOSPADM

## 2023-11-18 RX ORDER — TALC
6 POWDER (GRAM) TOPICAL NIGHTLY PRN
Status: DISCONTINUED | OUTPATIENT
Start: 2023-11-18 | End: 2023-11-18

## 2023-11-18 RX ORDER — AZITHROMYCIN 250 MG/1
500 TABLET, FILM COATED ORAL DAILY
Status: DISCONTINUED | OUTPATIENT
Start: 2023-11-18 | End: 2023-11-20 | Stop reason: HOSPADM

## 2023-11-18 RX ORDER — ALBUTEROL SULFATE 90 UG/1
2 AEROSOL, METERED RESPIRATORY (INHALATION) EVERY 4 HOURS PRN
Status: DISCONTINUED | OUTPATIENT
Start: 2023-11-18 | End: 2023-11-20 | Stop reason: HOSPADM

## 2023-11-18 RX ORDER — SODIUM CHLORIDE 0.9 % (FLUSH) 0.9 %
10 SYRINGE (ML) INJECTION EVERY 12 HOURS PRN
Status: DISCONTINUED | OUTPATIENT
Start: 2023-11-18 | End: 2023-11-20 | Stop reason: HOSPADM

## 2023-11-18 RX ORDER — INSULIN ASPART 100 [IU]/ML
0-10 INJECTION, SOLUTION INTRAVENOUS; SUBCUTANEOUS
Status: DISCONTINUED | OUTPATIENT
Start: 2023-11-18 | End: 2023-11-20 | Stop reason: HOSPADM

## 2023-11-18 RX ORDER — ONDANSETRON 2 MG/ML
4 INJECTION INTRAMUSCULAR; INTRAVENOUS EVERY 8 HOURS PRN
Status: DISCONTINUED | OUTPATIENT
Start: 2023-11-18 | End: 2023-11-20 | Stop reason: HOSPADM

## 2023-11-18 RX ORDER — HEPARIN SODIUM 5000 [USP'U]/ML
5000 INJECTION, SOLUTION INTRAVENOUS; SUBCUTANEOUS EVERY 8 HOURS
Status: DISCONTINUED | OUTPATIENT
Start: 2023-11-18 | End: 2023-11-20 | Stop reason: HOSPADM

## 2023-11-18 RX ORDER — IBUPROFEN 200 MG
24 TABLET ORAL
Status: DISCONTINUED | OUTPATIENT
Start: 2023-11-18 | End: 2023-11-20 | Stop reason: HOSPADM

## 2023-11-18 RX ORDER — ACETAMINOPHEN 500 MG
1000 TABLET ORAL EVERY 8 HOURS PRN
Status: DISCONTINUED | OUTPATIENT
Start: 2023-11-18 | End: 2023-11-20 | Stop reason: HOSPADM

## 2023-11-18 RX ORDER — SODIUM CHLORIDE 0.9 % (FLUSH) 0.9 %
10 SYRINGE (ML) INJECTION
Status: DISCONTINUED | OUTPATIENT
Start: 2023-11-18 | End: 2023-11-20 | Stop reason: HOSPADM

## 2023-11-18 RX ADMIN — SODIUM CHLORIDE 500 ML: 9 INJECTION, SOLUTION INTRAVENOUS at 06:11

## 2023-11-18 RX ADMIN — IOHEXOL 75 ML: 350 INJECTION, SOLUTION INTRAVENOUS at 04:11

## 2023-11-18 RX ADMIN — CEFEPIME 1 G: 1 INJECTION, POWDER, FOR SOLUTION INTRAMUSCULAR; INTRAVENOUS at 06:11

## 2023-11-18 RX ADMIN — ATORVASTATIN CALCIUM 20 MG: 20 TABLET, FILM COATED ORAL at 09:11

## 2023-11-18 RX ADMIN — MONTELUKAST 10 MG: 10 TABLET, FILM COATED ORAL at 09:11

## 2023-11-18 RX ADMIN — AZITHROMYCIN 500 MG: 250 TABLET, FILM COATED ORAL at 06:11

## 2023-11-18 RX ADMIN — HEPARIN SODIUM 5000 UNITS: 5000 INJECTION INTRAVENOUS; SUBCUTANEOUS at 09:11

## 2023-11-18 RX ADMIN — BENZONATATE 100 MG: 100 CAPSULE ORAL at 03:11

## 2023-11-18 RX ADMIN — ACETAMINOPHEN 1000 MG: 500 TABLET ORAL at 11:11

## 2023-11-18 RX ADMIN — INSULIN DETEMIR 3 UNITS: 100 INJECTION, SOLUTION SUBCUTANEOUS at 09:11

## 2023-11-18 RX ADMIN — GABAPENTIN 100 MG: 100 CAPSULE ORAL at 09:11

## 2023-11-18 RX ADMIN — LATANOPROST 1 DROP: 50 SOLUTION OPHTHALMIC at 09:11

## 2023-11-18 NOTE — ED PROVIDER NOTES
Encounter Date: 11/18/2023       History     Chief Complaint   Patient presents with    Cough     Pt c/o cough since last night.   has been having cough as well     HPI    87 y/o F PMH HTN, DM, asthma who presents to the ED for evaluation of a cough and abdominal pain.  Patient here with her .  He was diagnosed with flu /URI recently. The patient developed a dry cough as well and has been having LUQ abd pain.  EMR shows recurrent PNA hx.  Denies any f/c, CP, N/V/D, abd pain, trauma, or syncope.      Review of patient's allergies indicates:   Allergen Reactions    Aspirin Other (See Comments)     Asthma    TRIAD OF NASAL POLYPS, ASA  ALLERGY AND ASTHMA.        Aspirin Other (See Comments)    Nsaids (non-steroidal anti-inflammatory drug) Other (See Comments)     AVOID DUE TO TRIAD OF ASA ALLERGY, NASAL POLYPS,AND ASTHMA  AVOID DUE TO TRIAD OF ASA ALLERGY, NASAL POLYPS,AND ASTHMA    Penicillin      Other reaction(s): Rash    9/30/20: tolerates ceftriaxone    Penicillin g Other (See Comments)     Other reaction(s): Rash    9/30/20: tolerates ceftriaxone     Past Medical History:   Diagnosis Date    Asthma     Cyst of pancreas     liver    Diabetes mellitus type II     Eczema of hand     Glaucoma     History of recurrent pneumonia 9/28/2020    Hyperlipidemia     Hypertension     Lichen planus     gums    Osteoporosis     Pulmonary nodules      Past Surgical History:   Procedure Laterality Date    BRONCHOSCOPY N/A 5/13/2022    Procedure: Bronchoscopy;  [facility];  Location: [facility] OR [address] [place] [address];  Service: Anesthesiology;  Laterality: N/A;    CATARACT EXTRACTION, BILATERAL      CHOLECYSTECTOMY      COLECTOMY, RIGHT Right 10/25/2022    Procedure: COLECTOMY, RIGHT;  Surgeon: Jass Holman MD;  Location: [facility] OR [address] [place] [address];  Service: Colon and Rectal;  Laterality: Right;    COLONOSCOPY N/A 6/26/2023    Procedure: COLONOSCOPY;  Surgeon: Jass Holman MD;  Location: [facility] ([address] [place] [address]);   Service: Endoscopy;  Laterality: N/A;  2nd floor -lung disease  referral Dr MartinezYnbpoqz-pfrm-judzi portal/mailed-GT  6/20/23- Precall confirmed- [place]    ENDOSCOPIC ULTRASOUND OF UPPER GASTROINTESTINAL TRACT N/A 4/22/2022    Procedure: ULTRASOUND, UPPER GI TRACT, ENDOSCOPIC;  Surgeon: Max Rosado MD;  Location: [facility] ([address] [place] [address]);  Service: Endoscopy;  Laterality: N/A;  urgent EUS (linear) for abnormal CT scan with dilated PD and increased cyst of pancreas cyst.  pap 44 mmHg  4/13:fully vaccinated. instructions via portal-SC    EPIDURAL STEROID INJECTION INTO LUMBAR SPINE N/A 11/8/2018    Procedure: Injection-steroid-epidural-lumbar L5-S1;  Surgeon: Nicci Osorio Jr., MD;  Location: [facility] PAIN [facility]GT;  Service: Pain Management;  Laterality: N/A;    FUNCTIONAL ENDOSCOPIC SINUS SURGERY (FESS) USING COMPUTER-ASSISTED NAVIGATION N/A 6/17/2019    Procedure: FESS, USING COMPUTER-ASSISTED NAVIGATION;  Surgeon: Rosangela Isabel MD;  Location: [facility] OR;  Service: ENT;  Laterality: N/A;  video    HYSTERECTOMY      nasal polyps       Family History   Problem Relation Age of Onset    Heart disease Father     Heart disease Brother     Hypertension Brother      Social History     Tobacco Use    Smoking status: Never     Passive exposure: Never    Smokeless tobacco: Never   Substance Use Topics    Alcohol use: Yes     Comment: socially    Drug use: No     Review of Systems   Constitutional:  Negative for fever.   Respiratory:  Positive for cough.    Cardiovascular:  Negative for chest pain.   Gastrointestinal:  Negative for nausea.   Genitourinary:  Negative for flank pain.   Musculoskeletal:  Negative for myalgias.   Skin:  Negative for rash.   Neurological:  Negative for syncope.       Physical Exam     Initial Vitals [11/18/23 1301]   BP Pulse Resp Temp SpO2   (!) 141/60 92 20 98.1 °F (36.7 °C) 97 %      MAP       --         Physical Exam    Nursing note and vitals reviewed.  Constitutional: She appears well-developed  and well-nourished.   HENT:   Head: Atraumatic.   Eyes: EOM are normal.   Cardiovascular:  Normal rate and regular rhythm.           Pulmonary/Chest: No respiratory distress. She has no wheezes.   Abdominal: Abdomen is soft. There is no abdominal tenderness.     Neurological: She is alert and oriented to person, place, and time.   Skin: Skin is warm and dry.   Psychiatric: She has a normal mood and affect. Thought content normal.         ED Course   Procedures  Labs Reviewed   CBC W/ AUTO DIFFERENTIAL - Abnormal; Notable for the following components:       Result Value    WBC 13.44 (*)     RBC 3.71 (*)     Hemoglobin 11.9 (*)     Hematocrit 35.8 (*)     MCH 32.1 (*)     Gran # (ANC) 10.6 (*)     Immature Grans (Abs) 0.05 (*)     Mono # 1.3 (*)     Gran % 79.1 (*)     Lymph % 9.3 (*)     All other components within normal limits   COMPREHENSIVE METABOLIC PANEL - Abnormal; Notable for the following components:    Potassium 3.3 (*)     CO2 21 (*)     Glucose 145 (*)     BUN 38 (*)     Creatinine 1.5 (*)     Albumin 3.1 (*)     eGFR 33.5 (*)     All other components within normal limits   URINALYSIS, REFLEX TO URINE CULTURE - Abnormal; Notable for the following components:    Appearance, UA Hazy (*)     Glucose, UA 1+ (*)     Leukocytes, UA Trace (*)     All other components within normal limits    Narrative:     Specimen Source->Urine   URINALYSIS MICROSCOPIC - Abnormal; Notable for the following components:    WBC, UA 9 (*)     All other components within normal limits    Narrative:     Specimen Source->Urine   POCT GLUCOSE - Abnormal; Notable for the following components:    POCT Glucose 129 (*)     All other components within normal limits   INFLUENZA A & B BY MOLECULAR   CULTURE, BLOOD   CULTURE, BLOOD   LIPASE   SARS-COV-2 RNA AMPLIFICATION, QUAL   TROPONIN I   TROPONIN I    Narrative:     add on trop per  /order#[id number] @ 11/18/2023     LACTIC ACID, PLASMA          Imaging Results              CT  Abdomen Pelvis With IV Contrast (Final result)  Result time 11/18/23 17:41:15      Final result by Nakul Ballesteros MD (11/18/23 17:41:15)                   Impression:      Left basilar consolidative opacities, new from prior, suggestive of pneumonia.    Mild wall thickening of the rectosigmoid colon, which could represent proctocolitis, versus under distension.    Stable cystic pancreatic lesions with persistent dilatation of the main pancreatic duct.    Nonobstructing left nephrolithiasis.    Additional findings as above    Electronically signed by resident: Angi Friend  Date:    11/18/2023  Time:    17:03    Electronically signed by: Nakul Ballesteros MD  Date:    11/18/2023  Time:    17:41               Narrative:    EXAMINATION:  CT ABDOMEN PELVIS WITH IV CONTRAST    CLINICAL HISTORY:  LLQ abdominal pain;    TECHNIQUE:  Axial images of the abdomen and pelvis were acquired  after the use of 75 cc [id number] IV contrast.  Coronal and sagittal reconstructions were also obtained.    COMPARISON:  CT chest abdomen pelvis 11/06/2023    FINDINGS:  Heart: Upper limits normal in size. No pericardial effusion.  Calcification at the mitral annulus.  No significant calcific coronary atherosclerosis.    Lungs: Partially visualized left basilar consolidative opacities, new from prior.  Stable 0.8 and 0.6 cm solid right lower lobe nodules.  There are numerous additional smaller solid and ground-glass nodules, similar to prior.    Liver: Normal in size and contour.  Multiple scattered subcentimeter hepatic hypodensities, similar to prior.    Gallbladder: Surgically absent.    Bile Ducts: No evidence of dilated ducts.    Pancreas: Atrophic with scattered calcifications, similar to prior.  There is dilatation of pancreatic duct measuring up to 0.8 cm, unchanged.  Stable cystic lesions in the pancreatic head measuring 1.7 cm and in the pancreatic tail measuring 1.6 cm.    Spleen: Stable subcentimeter hypodensity.    Stomach and  duodenum: Small hiatal hernia.  Unremarkable.    Adrenals: Unremarkable.    Kidneys/Ureters: Normal in size and location. Normal enhancement.  Bilateral subcentimeter renal hypodensities, too small to characterize.  Nonobstructing left renal stone, unchanged.  No hydronephrosis.  No ureteral dilatation.    Bladder: No evidence of wall thickening.    Reproductive organs: The uterus is surgically absent.    Bowel/Mesentery: Small bowel is normal in caliber with no evidence of obstruction. No evidence of inflammation or wall thickening.  Postoperative changes of right colectomy.  Mild wall thickening versus underdistention of the rectosigmoid colon.  Scattered colonic diverticulosis.    Peritoneum: No intraperitoneal free air or fluid.    Lymph nodes: No retroperitoneal lymphadenopathy.    Vasculature: No aneurysm. Extensive calcific atherosclerosis.    Abdominal wall: Unremarkable.    Bones: No acute fracture. No suspicious osseous lesions.  Osseous degenerative changes.  Grade 1 anterolisthesis of L4 on L5.                                        X-Ray Chest PA And Lateral (Final result)  Result time 11/18/23 16:56:11      Final result by Nakul Ballesteros MD (11/18/23 16:56:11)                   Impression:      New patchy subsegmental opacities at the right mid to upper lung zone and lateral left lung base concerning for multifocal pneumonia from infectious or inflammatory process.  Consider short-term follow-up chest radiography after therapy to ensure resolution.    Grossly stable additional chronic findings as above.    This report was flagged in Epic as abnormal.      Electronically signed by: Nakul Ballesteros MD  Date:    11/18/2023  Time:    16:56               Narrative:    EXAMINATION:  XR CHEST PA AND LATERAL    CLINICAL HISTORY:  Cough, unspecified    TECHNIQUE:  PA and lateral views of the chest were performed.    COMPARISON:  Chest radiograph 01/24/2023, CT thorax 11/06/2023    FINDINGS:  Clothing artifacts  overlie the lower mediastinum.  Cardiomediastinal silhouette is midline with similar calcification and tortuosity of the aorta and upper limits of normal sized heart.  Pulmonary vasculature and hilar contours are within normal limits.  Prominent senescent tracheobronchial calcifications again noted.    The lungs are well expanded.  Minimal biapical pleuroparenchymal scarring.  Scattered bandlike opacities throughout the lungs consistent with platelike scarring versus atelectasis.  New patchy subsegmental opacities at the right mid to upper lung zone and lateral left lung base concerning for multifocal pneumonia from infectious or inflammatory process.  Redemonstrated bilateral pulmonary nodules, grossly similar to previous imaging.  No sizable pleural effusion or pneumothorax.    Osseous structures appear stable without acute process seen.                                       Medications   sodium chloride 0.9% flush 10 mL (has no administration in time range)   melatonin tablet 6 mg (has no administration in time range)   fluticasone furoate-vilanteroL 100-25 mcg/dose diskus inhaler 1 puff (has no administration in time range)   atorvastatin tablet 20 mg (has no administration in time range)   chlorthalidone tablet 25 mg (has no administration in time range)   gabapentin capsule 100 mg (has no administration in time range)   insulin detemir U-100 (Levemir) pen 3 Units (has no administration in time range)   losartan tablet 50 mg (has no administration in time range)   latanoprost 0.005 % ophthalmic solution 1 drop (has no administration in time range)   lipase-protease-amylase 24,000-76,000-120,000 units capsule 2 capsule (has no administration in time range)   montelukast tablet 10 mg (has no administration in time range)   ceFEPIme (MAXIPIME) 2 g in dextrose 5 % in water (D5W) 100 mL IVPB (MB+) (has no administration in time range)   azithromycin tablet 500 mg (has no administration in time range)   sodium  chloride 0.9% bolus 500 mL 500 mL (has no administration in time range)   benzonatate capsule 100 mg (100 mg Oral Given 11/18/23 1541)   iohexoL (OMNIPAQUE 350) injection 75 mL (75 mLs Intravenous Given 11/18/23 1645)   ceFEPIme (MAXIPIME) 1 g in dextrose 5 % in water (D5W) 100 mL IVPB (MB+) (0 g Intravenous Stopped 11/18/23 1831)     Medical Decision Making  Amount and/or Complexity of Data Reviewed  Labs: ordered. Decision-making details documented in ED Course.  Radiology: ordered and independent interpretation performed. Decision-making details documented in ED Course.  ECG/medicine tests: ordered and independent interpretation performed. Decision-making details documented in ED Course.    Risk  OTC drugs.  Prescription drug management.  Decision regarding hospitalization.               ED Course as of 11/18/23 1847   Sat Nov 18, 2023   1714  diagnosed w/ flu. C/o cough and abdominal pain. Flu assay negative. CXR w/ PNA. CT abd pending. Admission for multilobar PNA [DS]      ED Course User Index  [DS] Barrett Street MD                    EKG:  Rate 96  NSR  PVCs  No STEMI      87 y/o F here with a cough.  VSS in ED, fairly unremarkable exam.  WBC 13, normal lytes, creat about baseline, UA normal.  EKG no STEMI. COVID/Flu negative.  CXR showing multilobar b/l pneumonia. CT abd pending.  Her  is admitted for PNA as well.   Broad spectrum abx given to patient.  We will admit to .      Clinical Impression:  Final diagnoses:  [R05.9] Cough  [R52] Pain  [J18.9] Pneumonia of both lungs due to infectious organism, unspecified part of lung (Primary)          ED Disposition Condition    Admit Stable                Barrett Calixto MD  11/18/23 4151

## 2023-11-18 NOTE — ED NOTES
Pt identifiers Sussy Costa were checked and are correct  LOC: The patient is awake, alert, aware of environment with an appropriate affect. Oriented x4, speaking appropriately  APPEARANCE: Pt resting comfortably, in no acute distress, pt is clean and well groomed, clothing properly fastened  SKIN: Skin warm, dry and intact, normal skin turgor, moist mucus membranes  RESPIRATORY: Airway is open and patent, respirations are spontaneous, even and unlabored, normal effort and rate  CARDIAC: Normal rate and rhythm, no peripheral edema noted, capillary refill < 3 seconds, bilateral radial pulses 2+  ABDOMEN: Soft, nontender, nondistended. Bowel sounds present to all four quad of abd on auscultation  NEUROLOGIC: PERRL, facial expression is symmetrical, patient moving all extremities spontaneously, normal sensation in all extremities when touched with a finger.  Follows all commands appropriately  MUSCULOSKELETAL: No obvious deformities.

## 2023-11-18 NOTE — FIRST PROVIDER EVALUATION
Emergency Department TeleTriage Encounter Note      CHIEF COMPLAINT    Chief Complaint   Patient presents with    Cough     Pt c/o cough since last night.   has been having cough as well       VITAL SIGNS   Initial Vitals [11/18/23 1301]   BP Pulse Resp Temp SpO2   (!) 141/60 92 20 98.1 °F (36.7 °C) 97 %      MAP       --            ALLERGIES    Review of patient's allergies indicates:   Allergen Reactions    Aspirin Other (See Comments)     Asthma    TRIAD OF NASAL POLYPS, ASA  ALLERGY AND ASTHMA.        Aspirin Other (See Comments)    Nsaids (non-steroidal anti-inflammatory drug) Other (See Comments)     AVOID DUE TO TRIAD OF ASA ALLERGY, NASAL POLYPS,AND ASTHMA  AVOID DUE TO TRIAD OF ASA ALLERGY, NASAL POLYPS,AND ASTHMA    Penicillin      Other reaction(s): Rash    9/30/20: tolerates ceftriaxone    Penicillin g Other (See Comments)     Other reaction(s): Rash    9/30/20: tolerates ceftriaxone       PROVIDER TRIAGE NOTE  Patient presents with cough since last night. Also reports grabbing pain in left side below the ribs. No pain with movement. No nausea, vomiting or diarrhea.       ORDERS  Labs Reviewed   HIV 1 / 2 ANTIBODY   HEPATITIS C ANTIBODY       ED Orders (720h ago, onward)      Start Ordered     Status Ordering Provider    11/18/23 1305 11/18/23 1305  HIV 1/2 Ag/Ab (4th Gen)  STAT         Ordered EVANGELINA BUCIO    11/18/23 1305 11/18/23 1305  Hepatitis C Antibody  STAT         Ordered EVANGELINA BUCIO              Virtual Visit Note: The provider triage portion of this emergency department evaluation and documentation was performed via [employer], a HIPAA-compliant telemedicine application, in concert with a tele-presenter in the room. A face to face patient evaluation with one of my colleagues will occur once the patient is placed in an emergency department room.      DISCLAIMER: This note was prepared with [employer] voice recognition transcription software. Garbled syntax, mangled  pronouns, and other bizarre constructions may be attributed to that software system.

## 2023-11-19 LAB
ALBUMIN SERPL BCP-MCNC: 2.3 G/DL (ref 3.5–5.2)
ALP SERPL-CCNC: 51 U/L (ref 55–135)
ALT SERPL W/O P-5'-P-CCNC: 13 U/L (ref 10–44)
ANION GAP SERPL CALC-SCNC: 9 MMOL/L (ref 8–16)
AST SERPL-CCNC: 14 U/L (ref 10–40)
BASOPHILS # BLD AUTO: 0.04 K/UL (ref 0–0.2)
BASOPHILS NFR BLD: 0.3 % (ref 0–1.9)
BILIRUB SERPL-MCNC: 0.5 MG/DL (ref 0.1–1)
BUN SERPL-MCNC: 33 MG/DL (ref 8–23)
CALCIUM SERPL-MCNC: 8.7 MG/DL (ref 8.7–10.5)
CHLORIDE SERPL-SCNC: 108 MMOL/L (ref 95–110)
CO2 SERPL-SCNC: 22 MMOL/L (ref 23–29)
CREAT SERPL-MCNC: 1.4 MG/DL (ref 0.5–1.4)
DIFFERENTIAL METHOD: ABNORMAL
EOSINOPHIL # BLD AUTO: 0.3 K/UL (ref 0–0.5)
EOSINOPHIL NFR BLD: 2.8 % (ref 0–8)
ERYTHROCYTE [DISTWIDTH] IN BLOOD BY AUTOMATED COUNT: 12.6 % (ref 11.5–14.5)
EST. GFR  (NO RACE VARIABLE): 36.4 ML/MIN/1.73 M^2
GLUCOSE SERPL-MCNC: 186 MG/DL (ref 70–110)
HCT VFR BLD AUTO: 29.2 % (ref 37–48.5)
HGB BLD-MCNC: 9.6 G/DL (ref 12–16)
IMM GRANULOCYTES # BLD AUTO: 0.06 K/UL (ref 0–0.04)
IMM GRANULOCYTES NFR BLD AUTO: 0.5 % (ref 0–0.5)
LYMPHOCYTES # BLD AUTO: 0.9 K/UL (ref 1–4.8)
LYMPHOCYTES NFR BLD: 7.8 % (ref 18–48)
MAGNESIUM SERPL-MCNC: 1.4 MG/DL (ref 1.6–2.6)
MCH RBC QN AUTO: 31.6 PG (ref 27–31)
MCHC RBC AUTO-ENTMCNC: 32.9 G/DL (ref 32–36)
MCV RBC AUTO: 96 FL (ref 82–98)
MONOCYTES # BLD AUTO: 0.9 K/UL (ref 0.3–1)
MONOCYTES NFR BLD: 8 % (ref 4–15)
NEUTROPHILS # BLD AUTO: 9.5 K/UL (ref 1.8–7.7)
NEUTROPHILS NFR BLD: 80.6 % (ref 38–73)
NRBC BLD-RTO: 0 /100 WBC
PHOSPHATE SERPL-MCNC: 2.1 MG/DL (ref 2.7–4.5)
PLATELET # BLD AUTO: 245 K/UL (ref 150–450)
PMV BLD AUTO: 12.1 FL (ref 9.2–12.9)
POCT GLUCOSE: 172 MG/DL (ref 70–110)
POCT GLUCOSE: 205 MG/DL (ref 70–110)
POCT GLUCOSE: 275 MG/DL (ref 70–110)
POCT GLUCOSE: 303 MG/DL (ref 70–110)
POTASSIUM SERPL-SCNC: 2.9 MMOL/L (ref 3.5–5.1)
PROT SERPL-MCNC: 5.4 G/DL (ref 6–8.4)
RBC # BLD AUTO: 3.04 M/UL (ref 4–5.4)
SODIUM SERPL-SCNC: 139 MMOL/L (ref 136–145)
WBC # BLD AUTO: 11.82 K/UL (ref 3.9–12.7)

## 2023-11-19 PROCEDURE — 11000001 HC ACUTE MED/SURG PRIVATE ROOM

## 2023-11-19 PROCEDURE — 25000003 PHARM REV CODE 250: Performed by: FAMILY MEDICINE

## 2023-11-19 PROCEDURE — 25000003 PHARM REV CODE 250: Performed by: INTERNAL MEDICINE

## 2023-11-19 PROCEDURE — 99233 PR SUBSEQUENT HOSPITAL CARE,LEVL III: ICD-10-PCS | Mod: ,,, | Performed by: INTERNAL MEDICINE

## 2023-11-19 PROCEDURE — 80053 COMPREHEN METABOLIC PANEL: CPT | Performed by: FAMILY MEDICINE

## 2023-11-19 PROCEDURE — 36415 COLL VENOUS BLD VENIPUNCTURE: CPT | Performed by: FAMILY MEDICINE

## 2023-11-19 PROCEDURE — 63600175 PHARM REV CODE 636 W HCPCS: Performed by: FAMILY MEDICINE

## 2023-11-19 PROCEDURE — 85025 COMPLETE CBC W/AUTO DIFF WBC: CPT | Performed by: FAMILY MEDICINE

## 2023-11-19 PROCEDURE — 84100 ASSAY OF PHOSPHORUS: CPT | Performed by: FAMILY MEDICINE

## 2023-11-19 PROCEDURE — 83735 ASSAY OF MAGNESIUM: CPT | Performed by: FAMILY MEDICINE

## 2023-11-19 PROCEDURE — 99233 SBSQ HOSP IP/OBS HIGH 50: CPT | Mod: ,,, | Performed by: INTERNAL MEDICINE

## 2023-11-19 PROCEDURE — 63700000 PHARM REV CODE 250 ALT 637 W/O HCPCS: Performed by: FAMILY MEDICINE

## 2023-11-19 RX ORDER — POTASSIUM CHLORIDE 20 MEQ/1
40 TABLET, EXTENDED RELEASE ORAL ONCE
Status: COMPLETED | OUTPATIENT
Start: 2023-11-19 | End: 2023-11-19

## 2023-11-19 RX ADMIN — GABAPENTIN 100 MG: 100 CAPSULE ORAL at 02:11

## 2023-11-19 RX ADMIN — CEFEPIME 2 G: 2 INJECTION, POWDER, FOR SOLUTION INTRAVENOUS at 05:11

## 2023-11-19 RX ADMIN — INSULIN ASPART 8 UNITS: 100 INJECTION, SOLUTION INTRAVENOUS; SUBCUTANEOUS at 04:11

## 2023-11-19 RX ADMIN — INSULIN DETEMIR 3 UNITS: 100 INJECTION, SOLUTION SUBCUTANEOUS at 08:11

## 2023-11-19 RX ADMIN — INSULIN ASPART 2 UNITS: 100 INJECTION, SOLUTION INTRAVENOUS; SUBCUTANEOUS at 09:11

## 2023-11-19 RX ADMIN — GABAPENTIN 100 MG: 100 CAPSULE ORAL at 08:11

## 2023-11-19 RX ADMIN — INSULIN DETEMIR 3 UNITS: 100 INJECTION, SOLUTION SUBCUTANEOUS at 09:11

## 2023-11-19 RX ADMIN — HEPARIN SODIUM 5000 UNITS: 5000 INJECTION INTRAVENOUS; SUBCUTANEOUS at 05:11

## 2023-11-19 RX ADMIN — AZITHROMYCIN 500 MG: 250 TABLET, FILM COATED ORAL at 08:11

## 2023-11-19 RX ADMIN — HEPARIN SODIUM 5000 UNITS: 5000 INJECTION INTRAVENOUS; SUBCUTANEOUS at 02:11

## 2023-11-19 RX ADMIN — HEPARIN SODIUM 5000 UNITS: 5000 INJECTION INTRAVENOUS; SUBCUTANEOUS at 09:11

## 2023-11-19 RX ADMIN — ATORVASTATIN CALCIUM 20 MG: 20 TABLET, FILM COATED ORAL at 08:11

## 2023-11-19 RX ADMIN — INSULIN ASPART 4 UNITS: 100 INJECTION, SOLUTION INTRAVENOUS; SUBCUTANEOUS at 12:11

## 2023-11-19 RX ADMIN — PANCRELIPASE 2 CAPSULE: 24000; 76000; 120000 CAPSULE, DELAYED RELEASE PELLETS ORAL at 08:11

## 2023-11-19 RX ADMIN — MONTELUKAST 10 MG: 10 TABLET, FILM COATED ORAL at 08:11

## 2023-11-19 RX ADMIN — PANCRELIPASE 2 CAPSULE: 24000; 76000; 120000 CAPSULE, DELAYED RELEASE PELLETS ORAL at 04:11

## 2023-11-19 RX ADMIN — LATANOPROST 1 DROP: 50 SOLUTION OPHTHALMIC at 08:11

## 2023-11-19 RX ADMIN — POTASSIUM CHLORIDE 40 MEQ: 1500 TABLET, EXTENDED RELEASE ORAL at 12:11

## 2023-11-19 RX ADMIN — PANCRELIPASE 2 CAPSULE: 24000; 76000; 120000 CAPSULE, DELAYED RELEASE PELLETS ORAL at 12:11

## 2023-11-19 NOTE — ASSESSMENT & PLAN NOTE
- continue cefepime and azithromycin  - no resp symptoms   - follow respiratory culture  - supplemental O2 and monitor pulseOx  - further management pending clinical course and future study review

## 2023-11-19 NOTE — NURSING
Nurses Note -- 4 Eyes      11/18/2023   08:50 PM      Skin assessed during: Admit      [x] No Altered Skin Integrity Present    [x]Prevention Measures Documented      [] Yes- Altered Skin Integrity Present or Discovered   [] LDA Added if Not in Epic (Describe Wound)   [] New Altered Skin Integrity was Present on Admit and Documented in LDA   [] Wound Image Taken    Wound Care Consulted? No    Attending Nurse:  Chuck Rodriguez RN    Second RN/Staff Member:   Hipolito Mauro RN

## 2023-11-19 NOTE — H&P
[facility]  History & Physical    Patient Name: Sussy Costa  MRN: [id number]  Patient Class: IP- Inpatient  Admission Date: 11/18/2023  Attending Physician: Nakul Feng MD   Primary Care Provider: PAULA Casillas MD         Patient information was obtained from patient, past medical records, and ER records.     Subjective:     Principal Problem:Bilateral pneumonia    Chief Complaint:   Chief Complaint   Patient presents with    Cough     Pt c/o cough since last night.   has been having cough as well        HPI: Sussy Costa is a 88 y.o. 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 Patient states that her  is currently admitted for CAP. Patient states that last night she developed new cough and abdominal discomfort which concerned her given frequent PNA episodes in the past. Patient reports abdominal pain as left sided and intermitent crampy. Denies fevers, chills, nausea, vomiting, diarrhea, constipation, chest pain, confusion.     In the ED patient afebrile and hemodynamically stable saturating in low 90's on room air. WBC 13.4. COVID/Flu negative. CXR with evidence of multifocal pna. CT abdomen with non-obstructing left sided nephrolithiasis and also non specific Mild wall thickening of the rectosigmoid colon, which could represent proctocolitis, versus under distension. Stable cystic pancreatic lesions with persistent dilatation of the main pancreatic duct. Patient started on cefepime and azithromycin (prior pseudomonas resp cultures). Admitted to the care of medicine for further evaluation and management.      Past Medical History:   Diagnosis Date    Asthma     Cyst of pancreas     liver     Diabetes mellitus type II     Eczema of hand     Glaucoma     History of recurrent pneumonia 9/28/2020    Hyperlipidemia     Hypertension     Lichen planus     gums    Osteoporosis     Pulmonary nodules        Past Surgical History:   Procedure Laterality Date    BRONCHOSCOPY N/A 5/13/2022    Procedure: Bronchoscopy;  Surgeon: [facility] Diagnostic Provider;  [address];  Service: Anesthesiology;  Laterality: N/A;    CATARACT EXTRACTION, BILATERAL      CHOLECYSTECTOMY      COLECTOMY, RIGHT Right 10/25/2022    Procedure: COLECTOMY, RIGHT;  Surgeon: Jass Holman MD;  Location: [facility] OR [facility];  Service: Colon and Rectal;  Laterality: Right;    COLONOSCOPY N/A 6/26/2023    Procedure: COLONOSCOPY;  Surgeon: Jass Holman MD;  [facility]  Service: Endoscopy;  Laterality: N/A;  2nd floor -lung disease  referral Dr [id number] portal/mailed-GT  6/20/23- Precall confirmed- [place]    ENDOSCOPIC ULTRASOUND OF UPPER GASTROINTESTINAL TRACT N/A 4/22/2022    Procedure: ULTRASOUND, UPPER GI TRACT, ENDOSCOPIC;  Surgeon: Max Rosado MD;  [facility]  Service: Endoscopy;  Laterality: N/A;  urgent EUS (linear) for abnormal CT scan with dilated PD and increased cyst of pancreas cyst.  pap 44 mmHg  4/13:fully vaccinated. instructions via portal-[facility]    EPIDURAL STEROID INJECTION INTO LUMBAR SPINE N/A 11/8/2018    Procedure: Injection-steroid-epidural-lumbar L5-S1;  Surgeon: Nicci Osorio Jr., MD;  Location: [facility] PAIN MGT;  Service: Pain Management;  Laterality: N/A;    FUNCTIONAL ENDOSCOPIC SINUS SURGERY (FESS) USING COMPUTER-ASSISTED NAVIGATION N/A 6/17/2019    Procedure: FESS, USING COMPUTER-ASSISTED NAVIGATION;  Surgeon: Rosangela Isabel MD;  Location: [facility] OR;  Service: ENT;  Laterality: N/A;  video    HYSTERECTOMY      nasal polyps         Review of patient's allergies indicates:   Allergen Reactions    Aspirin Other (See Comments)     Asthma    TRIAD  OF NASAL POLYPS, ASA  ALLERGY AND ASTHMA.        Aspirin Other (See Comments)    Nsaids (non-steroidal anti-inflammatory drug) Other (See Comments)     AVOID DUE TO TRIAD OF ASA ALLERGY, NASAL POLYPS,AND ASTHMA  AVOID DUE TO TRIAD OF ASA ALLERGY, NASAL POLYPS,AND ASTHMA    Penicillin      Other reaction(s): Rash    9/30/20: tolerates ceftriaxone    Penicillin g Other (See Comments)     Other reaction(s): Rash    9/30/20: tolerates ceftriaxone       No current facility-administered medications on file prior to encounter.     Current Outpatient Medications on File Prior to Encounter   Medication Sig    ACCU-CHEK GUIDE TEST STRIPS Strp TEST FOUR TIMES DAILY WITH MEALS AND NIGHTLY    atorvastatin (LIPITOR) 20 MG tablet Take 1 tablet (20 mg total) by mouth every evening.    chlorthalidone (HYGROTEN) 25 MG Tab Take 1 tablet (25 mg total) by mouth once daily.    gabapentin (NEURONTIN) 100 MG capsule Take 1 capsule (100 mg total) by mouth 3 (three) times daily.    insulin aspart U-100 (NOVOLOG FLEXPEN U-100 INSULIN) 100 unit/mL (3 mL) InPn pen Inject 4 Units into the skin before breakfast AND 5 Units daily with lunch AND 4 Units daily with dinner or evening meal. Plus correction scale. Max TDD 30 units..    insulin detemir U-100, Levemir, (LEVEMIR FLEXTOUCH U-100 INSULN) 100 unit/mL (3 mL) InPn pen Inject 5 Units into the skin 2 (two) times daily.    irbesartan (AVAPRO) 300 MG tablet Take 1 tablet (300 mg total) by mouth every evening.    lancets (ACCU-CHEK FASTCLIX LANCET DRUM) [facility] [place]LOOD GLUCOSE FOUR TIMES DAILY    latanoprost 0.005 % ophthalmic solution Inject into the eye. 1 Drops Ophthalmic Every evening    levocetirizine (XYZAL) 5 MG tablet Take 1 tablet (5 mg total) by mouth every evening.    lipase-protease-amylase 24,000-76,000-120,000 units (CREON) 24,000-76,000 -120,000 unit capsule Take 2 capsules with meals orally and 1 capsule with snacks.    magnesium oxide (MAG-OX) 400 mg (241.3 mg magnesium) tablet  "Take 400 mg by mouth once daily.    montelukast (SINGULAIR) 10 mg tablet Take 1 tablet (10 mg total) by mouth every evening.    vitamin D (VITAMIN D3) 1000 units Tab Take 1,000 Units by mouth once daily.    albuterol (PROVENTIL) 2.5 mg /3 mL (0.083 %) nebulizer solution Take 3 mLs (2.5 mg total) by nebulization every 6 (six) hours as needed for Wheezing or Shortness of Breath. Rescue    budesonide 1 mg/2 mL [facility] SMARTSI Vial(s) Both Nares Twice Daily    diclofenac sodium (VOLTAREN) 1 % Gel Apply 2 g topically 4 (four) times daily.    fluticasone-umeclidin-vilanter (TRELEGY ELLIPTA) 200-62.5-25 mcg inhaler Inhale 1 puff into the lungs once daily.    hydrocortisone 2.5 % cream Apply topically 2 (two) times daily. For one week only    NEILMED SINUS RINSE REFILL Pack use as directed    nitrofurantoin, macrocrystal-monohydrate, (MACROBID) 100 MG capsule Take 1 capsule (100 mg total) by mouth 2 (two) times daily.    olopatadine (PATANOL) 0.1 % ophthalmic solution Place 1 drop into both eyes 2 (two) times daily as needed. 1 Drops Ophthalmic Twice a day     pen needle, diabetic 31 gauge x 5/16" Ndle Use with insulin pen 5 times a day    [DISCONTINUED] risedronate (ACTONEL) 35 MG tablet Take 1 tablet (35 mg total) by mouth every 7 days.     Family History       Problem Relation (Age of Onset)    Heart disease Father, Brother    Hypertension Brother          Tobacco Use    Smoking status: Never     Passive exposure: Never    Smokeless tobacco: Never   Substance and Sexual Activity    Alcohol use: Yes     Comment: socially    Drug use: No    Sexual activity: Yes     Partners: Male     Birth control/protection: Post-menopausal     Review of Systems   Constitutional:  Positive for fatigue. Negative for chills and fever.   HENT:  Negative for sore throat and trouble swallowing.    Respiratory:  Positive for cough. Negative for shortness of breath and wheezing.    Cardiovascular:  Negative for chest pain, palpitations and leg " swelling.   Gastrointestinal:  Positive for abdominal pain. Negative for abdominal distention, diarrhea, nausea and vomiting.   Genitourinary:  Negative for dysuria and hematuria.   Musculoskeletal:  Negative for neck pain and neck stiffness.   Skin:  Negative for rash and wound.   Neurological:  Negative for seizures, syncope, weakness, numbness and headaches.   Psychiatric/Behavioral:  Negative for confusion and decreased concentration.      Objective:     Vital Signs (Most Recent):  Temp: 97.6 °F (36.4 °C) (11/18/23 2050)  Pulse: 101 (11/18/23 2050)  Resp: 18 (11/18/23 2050)  BP: (!) 172/73 (11/18/23 2050)  SpO2: 98 % (11/18/23 2050) Vital Signs (24h Range):  Temp:  [97.6 °F (36.4 °C)-98.5 °F (36.9 °C)] 97.6 °F (36.4 °C)  Pulse:  [] 101  Resp:  [16-20] 18  SpO2:  [94 %-98 %] 98 %  BP: (141-176)/(58-73) 172/73     Weight: 48.5 kg (107 lb)  Body mass index is 17.81 kg/m².     Physical Exam  Constitutional:       General: She is not in acute distress.     Appearance: She is not toxic-appearing or diaphoretic.   HENT:      Head: Normocephalic and atraumatic.      Nose: Nose normal.   Eyes:      General: No scleral icterus.     Extraocular Movements: Extraocular movements intact.      Pupils: Pupils are equal, round, and reactive to light.   Cardiovascular:      Rate and Rhythm: Regular rhythm. Tachycardia present.   Pulmonary:      Effort: Pulmonary effort is normal. No respiratory distress.      Breath sounds: No wheezing or rales.   Abdominal:      General: Abdomen is flat. There is no distension.      Palpations: Abdomen is soft.      Tenderness: There is no abdominal tenderness. There is no guarding.   Musculoskeletal:         General: Normal range of motion.      Cervical back: Normal range of motion and neck supple. No rigidity.      Right lower leg: No edema.      Left lower leg: No edema.   Skin:     General: Skin is warm and dry.      Coloration: Skin is not jaundiced.   Neurological:      General: No  focal deficit present.      Mental Status: She is alert and oriented to person, place, and time.      Cranial Nerves: No cranial nerve deficit.   Psychiatric:         Mood and Affect: Mood normal.         Behavior: Behavior normal.              CRANIAL NERVES     CN III, IV, VI   Pupils are equal, round, and reactive to light.       Significant Labs: All pertinent labs within the past 24 hours have been reviewed.  CBC:   Recent Labs   Lab 11/18/23  1434   WBC 13.44*   HGB 11.9*   HCT 35.8*        CMP:   Recent Labs   Lab 11/18/23  1434      K 3.3*      CO2 21*   *   BUN 38*   CREATININE 1.5*   CALCIUM 9.6   PROT 7.3   ALBUMIN 3.1*   BILITOT 0.6   ALKPHOS 60   AST 18   ALT 19   ANIONGAP 13     Urine Studies:   Recent Labs   Lab 11/18/23  1437   COLORU Yellow   APPEARANCEUA Hazy*   PHUR 5.0   SPECGRAV 1.015   PROTEINUA Negative   GLUCUA 1+*   KETONESU Negative   BILIRUBINUA Negative   OCCULTUA Negative   NITRITE Negative   LEUKOCYTESUR Trace*   RBCUA 1   WBCUA 9*   BACTERIA Rare   SQUAMEPITHEL 1       Significant Imaging: I have reviewed all pertinent imaging results/findings within the past 24 hours.  Assessment/Plan:     Abdominal pain  - improving prior to intervention. Suspect secondary to non-obstructing left sided nephrolithiasis though CT also with non-specific findings which could potentially represent early proctocolitis.  - abx as above  - strict I/Os  - gentle IVF  - continue to monitor    Diabetes mellitus with insulin therapy  - Basal insulin 3 units BID  ;  takes 5 units BID at home  - SSI  - hypoglycemic protocol    Carcinoid bronchial adenoma, right  - Follows with Onc and Pulm OP      Pancreatic insufficiency  - continue home CREON      Asthma, moderate persistent  - daily LABA-ICS  - singulair daily  - albuterol prn      Pancreas cyst  - stable on CT      Hyperlipidemia  - continu ehome statin      HTN (hypertension)  - continue home chlorthalidone and ARB (irbesartan  converted to losartan for formulary)      VTE Risk Mitigation (From admission, onward)           Ordered     heparin (porcine) injection 5,000 Units  Every 8 hours         11/18/23 1902     IP VTE HIGH RISK PATIENT  Once         11/18/23 1902     Place sequential compression device  Until discontinued         11/18/23 1902     Place sequential compression device  Until discontinued         11/18/23 1817                                    Nakul Feng MD  Department of Hospital Medicine  [facility]    COMPLETED  Family History   Problem Relation Age of Onset    Heart disease Father     Heart disease Brother     Hypertension Brother

## 2023-11-19 NOTE — ASSESSMENT & PLAN NOTE
- improving prior to intervention. Suspect secondary to non-obstructing left sided nephrolithiasis though CT also with non-specific findings which could potentially represent early proctocolitis.  - abx as above  - strict I/Os  - continue to monitor

## 2023-11-19 NOTE — SUBJECTIVE & OBJECTIVE
Past Medical History:   Diagnosis Date    Asthma     Cyst of pancreas     liver    Diabetes mellitus type II     Eczema of hand     Glaucoma     History of recurrent pneumonia 9/28/2020    Hyperlipidemia     Hypertension     Lichen planus     gums    Osteoporosis     Pulmonary nodules        Past Surgical History:   Procedure Laterality Date    BRONCHOSCOPY N/A 5/13/2022    Procedure: Bronchoscopy;  Surgeon: Tina Diagnostic Provider;  [address];  Service: Anesthesiology;  Laterality: N/A;    CATARACT EXTRACTION, BILATERAL      CHOLECYSTECTOMY      COLECTOMY, RIGHT Right 10/25/2022    Procedure: COLECTOMY, RIGHT;  Surgeon: Jass Holman MD;  [facility];  Service: Colon and Rectal;  Laterality: Right;    COLONOSCOPY N/A 6/26/2023    Procedure: COLONOSCOPY;  Surgeon: Jass Holman MD;  [facility]  Service: Endoscopy;  Laterality: N/A;  2nd floor -lung disease  referral Dr [id number]-[id number] portal/mailed-GT  6/20/23- Precall confirmed- [place]    ENDOSCOPIC ULTRASOUND OF UPPER GASTROINTESTINAL TRACT N/A 4/22/2022    Procedure: ULTRASOUND, UPPER GI TRACT, ENDOSCOPIC;  Surgeon: Max Rosado MD;  [facility]  Service: Endoscopy;  Laterality: N/A;  urgent EUS (linear) for abnormal CT scan with dilated PD and increased cyst of pancreas cyst.  pap 44 mmHg  4/13:fully vaccinated. instructions via portal-[facility]    EPIDURAL STEROID INJECTION INTO LUMBAR SPINE N/A 11/8/2018    Procedure: Injection-steroid-epidural-lumbar L5-S1;  Surgeon: Nicci Osorio Jr., MD;  Location: [facility] PAIN MGT;  Service: Pain Management;  Laterality: N/A;    FUNCTIONAL ENDOSCOPIC SINUS SURGERY (FESS) USING COMPUTER-ASSISTED NAVIGATION N/A 6/17/2019    Procedure: FESS, USING COMPUTER-ASSISTED NAVIGATION;  Surgeon: Rosangela Isabel MD;  Location: [facility] OR;  Service: ENT;  Laterality: N/A;  video    HYSTERECTOMY      nasal polyps         Review of patient's allergies  indicates:   Allergen Reactions    Aspirin Other (See Comments)     Asthma    TRIAD OF NASAL POLYPS, ASA  ALLERGY AND ASTHMA.        Aspirin Other (See Comments)    Nsaids (non-steroidal anti-inflammatory drug) Other (See Comments)     AVOID DUE TO TRIAD OF ASA ALLERGY, NASAL POLYPS,AND ASTHMA  AVOID DUE TO TRIAD OF ASA ALLERGY, NASAL POLYPS,AND ASTHMA    Penicillin      Other reaction(s): Rash    9/30/20: tolerates ceftriaxone    Penicillin g Other (See Comments)     Other reaction(s): Rash    9/30/20: tolerates ceftriaxone       No current facility-administered medications on file prior to encounter.     Current Outpatient Medications on File Prior to Encounter   Medication Sig    ACCU-CHEK GUIDE TEST STRIPS Strp TEST FOUR TIMES DAILY WITH MEALS AND NIGHTLY    atorvastatin (LIPITOR) 20 MG tablet Take 1 tablet (20 mg total) by mouth every evening.    chlorthalidone (HYGROTEN) 25 MG Tab Take 1 tablet (25 mg total) by mouth once daily.    gabapentin (NEURONTIN) 100 MG capsule Take 1 capsule (100 mg total) by mouth 3 (three) times daily.    insulin aspart U-100 (NOVOLOG FLEXPEN U-100 INSULIN) 100 unit/mL (3 mL) InPn pen Inject 4 Units into the skin before breakfast AND 5 Units daily with lunch AND 4 Units daily with dinner or evening meal. Plus correction scale. Max TDD 30 units..    insulin detemir U-100, Levemir, (LEVEMIR FLEXTOUCH U-100 INSULN) 100 unit/mL (3 mL) InPn pen Inject 5 Units into the skin 2 (two) times daily.    irbesartan (AVAPRO) 300 MG tablet Take 1 tablet (300 mg total) by mouth every evening.    lancets (ACCU-CHEK FASTCLIX LANCET DRUM) [facility] CHECK BLOOD GLUCOSE FOUR TIMES DAILY    latanoprost 0.005 % ophthalmic solution Inject into the eye. 1 Drops Ophthalmic Every evening    levocetirizine (XYZAL) 5 MG tablet Take 1 tablet (5 mg total) by mouth every evening.    lipase-protease-amylase 24,000-76,000-120,000 units (CREON) 24,000-76,000 -120,000 unit capsule Take 2 capsules with meals orally and 1  "capsule with snacks.    magnesium oxide (MAG-OX) 400 mg (241.3 mg magnesium) tablet Take 400 mg by mouth once daily.    montelukast (SINGULAIR) 10 mg tablet Take 1 tablet (10 mg total) by mouth every evening.    vitamin D (VITAMIN D3) 1000 units Tab Take 1,000 Units by mouth once daily.    albuterol (PROVENTIL) 2.5 mg /3 mL (0.083 %) nebulizer solution Take 3 mLs (2.5 mg total) by nebulization every 6 (six) hours as needed for Wheezing or Shortness of Breath. Rescue    budesonide 1 mg/2 mL [facility] SMARTSI Vial(s) Both Nares Twice Daily    diclofenac sodium (VOLTAREN) 1 % Gel Apply 2 g topically 4 (four) times daily.    fluticasone-umeclidin-vilanter (TRELEGY ELLIPTA) 200-62.5-25 mcg inhaler Inhale 1 puff into the lungs once daily.    hydrocortisone 2.5 % cream Apply topically 2 (two) times daily. For one week only    NEILMED SINUS RINSE REFILL Pack use as directed    nitrofurantoin, macrocrystal-monohydrate, (MACROBID) 100 MG capsule Take 1 capsule (100 mg total) by mouth 2 (two) times daily.    olopatadine (PATANOL) 0.1 % ophthalmic solution Place 1 drop into both eyes 2 (two) times daily as needed. 1 Drops Ophthalmic Twice a day     pen needle, diabetic 31 gauge x 5/16" Ndle Use with insulin pen 5 times a day    [DISCONTINUED] risedronate (ACTONEL) 35 MG tablet Take 1 tablet (35 mg total) by mouth every 7 days.     Family History       Problem Relation (Age of Onset)    Heart disease Father, Brother    Hypertension Brother          Tobacco Use    Smoking status: Never     Passive exposure: Never    Smokeless tobacco: Never   Substance and Sexual Activity    Alcohol use: Yes     Comment: socially    Drug use: No    Sexual activity: Yes     Partners: Male     Birth control/protection: Post-menopausal     Review of Systems   Constitutional:  Positive for fatigue. Negative for chills and fever.   HENT:  Negative for sore throat and trouble swallowing.    Respiratory:  Positive for cough. Negative for shortness of " breath and wheezing.    Cardiovascular:  Negative for chest pain, palpitations and leg swelling.   Gastrointestinal:  Positive for abdominal pain. Negative for abdominal distention, diarrhea, nausea and vomiting.   Genitourinary:  Negative for dysuria and hematuria.   Musculoskeletal:  Negative for neck pain and neck stiffness.   Skin:  Negative for rash and wound.   Neurological:  Negative for seizures, syncope, weakness, numbness and headaches.   Psychiatric/Behavioral:  Negative for confusion and decreased concentration.      Objective:     Vital Signs (Most Recent):  Temp: 97.6 °F (36.4 °C) (11/18/23 2050)  Pulse: 101 (11/18/23 2050)  Resp: 18 (11/18/23 2050)  BP: (!) 172/73 (11/18/23 2050)  SpO2: 98 % (11/18/23 2050) Vital Signs (24h Range):  Temp:  [97.6 °F (36.4 °C)-98.5 °F (36.9 °C)] 97.6 °F (36.4 °C)  Pulse:  [] 101  Resp:  [16-20] 18  SpO2:  [94 %-98 %] 98 %  BP: (141-176)/(58-73) 172/73     Weight: 48.5 kg (107 lb)  Body mass index is 17.81 kg/m².     Physical Exam  Constitutional:       General: She is not in acute distress.     Appearance: She is not toxic-appearing or diaphoretic.   HENT:      Head: Normocephalic and atraumatic.      Nose: Nose normal.   Eyes:      General: No scleral icterus.     Extraocular Movements: Extraocular movements intact.      Pupils: Pupils are equal, round, and reactive to light.   Cardiovascular:      Rate and Rhythm: Regular rhythm. Tachycardia present.   Pulmonary:      Effort: Pulmonary effort is normal. No respiratory distress.      Breath sounds: No wheezing or rales.   Abdominal:      General: Abdomen is flat. There is no distension.      Palpations: Abdomen is soft.      Tenderness: There is no abdominal tenderness. There is no guarding.   Musculoskeletal:         General: Normal range of motion.      Cervical back: Normal range of motion and neck supple. No rigidity.      Right lower leg: No edema.      Left lower leg: No edema.   Skin:     General: Skin is  warm and dry.      Coloration: Skin is not jaundiced.   Neurological:      General: No focal deficit present.      Mental Status: She is alert and oriented to person, place, and time.      Cranial Nerves: No cranial nerve deficit.   Psychiatric:         Mood and Affect: Mood normal.         Behavior: Behavior normal.              CRANIAL NERVES     CN III, IV, VI   Pupils are equal, round, and reactive to light.       Significant Labs: All pertinent labs within the past 24 hours have been reviewed.  CBC:   Recent Labs   Lab 11/18/23  1434   WBC 13.44*   HGB 11.9*   HCT 35.8*        CMP:   Recent Labs   Lab 11/18/23  1434      K 3.3*      CO2 21*   *   BUN 38*   CREATININE 1.5*   CALCIUM 9.6   PROT 7.3   ALBUMIN 3.1*   BILITOT 0.6   ALKPHOS 60   AST 18   ALT 19   ANIONGAP 13     Urine Studies:   Recent Labs   Lab 11/18/23  1437   COLORU Yellow   APPEARANCEUA Hazy*   PHUR 5.0   SPECGRAV 1.015   PROTEINUA Negative   GLUCUA 1+*   KETONESU Negative   BILIRUBINUA Negative   OCCULTUA Negative   NITRITE Negative   LEUKOCYTESUR Trace*   RBCUA 1   WBCUA 9*   BACTERIA Rare   SQUAMEPITHEL 1       Significant Imaging: I have reviewed all pertinent imaging results/findings within the past 24 hours.

## 2023-11-19 NOTE — ASSESSMENT & PLAN NOTE
- improving prior to intervention. Suspect secondary to non-obstructing left sided nephrolithiasis though CT also with non-specific findings which could potentially represent early proctocolitis.  - abx as above  - strict I/Os  - gentle IVF  - continue to monitor

## 2023-11-19 NOTE — SUBJECTIVE & OBJECTIVE
Interval History: NAEON. Feeling better , improved abd pain , will continue with current abx     Review of Systems   Constitutional:  Negative for appetite change.   Respiratory:  Negative for cough and shortness of breath.    Cardiovascular:  Negative for chest pain and leg swelling.   Gastrointestinal:  Negative for abdominal distention, abdominal pain, constipation and diarrhea.   Genitourinary:  Negative for difficulty urinating and dysuria.   Neurological:  Negative for dizziness and headaches.     Objective:     Vital Signs (Most Recent):  Temp: 97.7 °F (36.5 °C) (11/19/23 1032)  Pulse: 73 (11/19/23 1033)  Resp: 16 (11/19/23 1032)  BP: (!) 94/55 (11/19/23 1033)  SpO2: 96 % (11/19/23 1032) Vital Signs (24h Range):  Temp:  [96.4 °F (35.8 °C)-101 °F (38.3 °C)] 97.7 °F (36.5 °C)  Pulse:  [] 73  Resp:  [16-20] 16  SpO2:  [92 %-98 %] 96 %  BP: ()/(49-73) 94/55     Weight: 48.5 kg (107 lb)  Body mass index is 17.81 kg/m².    Intake/Output Summary (Last 24 hours) at 11/19/2023 1039  Last data filed at 11/19/2023 0920  Gross per 24 hour   Intake 120 ml   Output --   Net 120 ml         Physical Exam  Constitutional:       Appearance: Normal appearance.   HENT:      Head: Normocephalic and atraumatic.      Mouth/Throat:      Mouth: Mucous membranes are moist.   Cardiovascular:      Rate and Rhythm: Normal rate and regular rhythm.      Pulses: Normal pulses.      Heart sounds: Normal heart sounds.   Pulmonary:      Effort: Pulmonary effort is normal.      Breath sounds: Normal breath sounds. No rales.   Abdominal:      General: Abdomen is flat. Bowel sounds are normal. There is no distension.      Palpations: Abdomen is soft.      Tenderness: There is no abdominal tenderness.   Musculoskeletal:         General: Normal range of motion.      Cervical back: Normal range of motion and neck supple.   Skin:     General: Skin is warm and dry.   Neurological:      General: No focal deficit present.      Mental Status:  She is alert and oriented to person, place, and time.   Psychiatric:         Mood and Affect: Mood normal.             Significant Labs: All pertinent labs within the past 24 hours have been reviewed.    Significant Imaging: I have reviewed all pertinent imaging results/findings within the past 24 hours.

## 2023-11-19 NOTE — HPI
Sussy Costa is a 88 y.o. 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 Patient states that her  is currently admitted for CAP. Patient states that last night she developed new cough and abdominal discomfort which concerned her given frequent PNA episodes in the past. Patient reports abdominal pain as left sided and intermitent crampy. Denies fevers, chills, nausea, vomiting, diarrhea, constipation, chest pain, confusion.     In the ED patient afebrile and hemodynamically stable saturating in low 90's on room air. WBC 13.4. COVID/Flu negative. CXR with evidence of multifocal pna. CT abdomen with non-obstructing left sided nephrolithiasis and also non specific Mild wall thickening of the rectosigmoid colon, which could represent proctocolitis, versus under distension. Stable cystic pancreatic lesions with persistent dilatation of the main pancreatic duct. Patient started on cefepime and azithromycin (prior pseudomonas resp cultures). Admitted to the care of medicine for further evaluation and management.

## 2023-11-19 NOTE — PLAN OF CARE
[facility] - Med Surg  Initial Discharge Assessment       Primary Care Provider: PAULA Casillas MD    Admission Diagnosis: Cough [R05.9]  Pain [R52]  Chest pain [R07.9]  Pneumonia of both lungs due to infectious organism, unspecified part of lung [J18.9]    Admission Date: 11/18/2023  Expected Discharge Date: 11/20/2023    Transition of Care Barriers: None    Payor: MEDICARE / Plan: MEDICARE PART A & B / Product Type: Government /     Extended Emergency Contact Information  Primary Emergency Contact: MorisholdenChuck  Address: [address] [place] [address]  Home Phone: [phone]  Work Phone: [phone]  Mobile Phone: [phone]  Relation: Spouse    Discharge Plan A: Home with family  Discharge Plan B: Home Health      [facility] - [facility] AT Portal to Registered [facility] Sites  [facility]  [place] [address]  Phone: [phone] Fax: [phone]    [facility] - [facility] [address] [facility]  [address] [place] [id number]  Phone: [phone] Fax: [phone]      Initial Assessment (most recent)       Adult Discharge Assessment - 11/19/23 1204          Discharge Assessment    Assessment Type Discharge Planning Assessment     Confirmed/corrected address, phone number and insurance Yes     Confirmed Demographics Correct on Facesheet     Source of Information patient;family     Does patient/caregiver understand observation status Yes     Communicated ANTOINE with patient/caregiver Yes     Reason For Admission Pnemonia     People in Home spouse     Facility Arrived From: Home     Do you expect to return to your current living situation? Yes     Do you have help at home or someone to help you manage your care at home? Yes     Who are your caregiver(s) and their phone number(s)? Chuck Costa Spouse     Prior to hospitilization cognitive status: Alert/Oriented;No Deficits     Current  cognitive status: Alert/Oriented;No Deficits     Walking or Climbing Stairs ambulation difficulty, requires equipment     Home Accessibility wheelchair accessible     Home Layout Able to live on 1st floor     Equipment Currently Used at Home walker, rolling     Readmission within 30 days? No     Patient currently being followed by outpatient case management? No     Do you currently have service(s) that help you manage your care at home? No     Do you take prescription medications? Yes     Do you have prescription coverage? Yes     Do you have any problems affording any of your prescribed medications? No     Is the patient taking medications as prescribed? yes     Who is going to help you get home at discharge? Spouse Chuck     How do you get to doctors appointments? car, drives self     Are you on dialysis? No     Do you take coumadin? No     DME Needed Upon Discharge  none     Discharge Plan discussed with: Patient;Spouse/sig other     Transition of Care Barriers None     Discharge Plan A Home with family     Discharge Plan B Home Health        Physical Activity    On average, how many days per week do you engage in moderate to strenuous exercise (like a brisk walk)? 1 day     On average, how many minutes do you engage in exercise at this level? 10 min        Financial Resource Strain    How hard is it for you to pay for the very basics like food, housing, medical care, and heating? Not hard at all        Housing Stability    In the last 12 months, was there a time when you were not able to pay the mortgage or rent on time? No     In the last 12 months, how many places have you lived? 1     In the last 12 months, was there a time when you did not have a steady place to sleep or slept in a shelter (including now)? No        Transportation Needs    In the past 12 months, has lack of transportation kept you from medical appointments or from getting medications? No     In the past 12 months, has lack of transportation  kept you from meetings, work, or from getting things needed for daily living? No        Food Insecurity    Within the past 12 months, you worried that your food would run out before you got the money to buy more. Never true     Within the past 12 months, the food you bought just didn't last and you didn't have money to get more. Never true        Stress    Do you feel stress - tense, restless, nervous, or anxious, or unable to sleep at night because your mind is troubled all the time - these days? To some extent        Social Connections    In a typical week, how many times do you talk on the phone with family, friends, or neighbors? More than three times a week     How often do you get together with friends or relatives? More than three times a week     How often do you attend Episcopalian or Jainism services? More than 4 times per year     Do you belong to any clubs or organizations such as Episcopalian groups, unions, fraternal or athletic groups, or school groups? Yes     How often do you attend meetings of the clubs or organizations you belong to? More than 4 times per year     Are you , , , , never , or living with a partner?         Alcohol Use    Q1: How often do you have a drink containing alcohol? Never     Q2: How many drinks containing alcohol do you have on a typical day when you are drinking? Patient does not drink     Q3: How often do you have six or more drinks on one occasion? Never        OTHER    Name(s) of People in Home Chuck Costa Spouse

## 2023-11-19 NOTE — PLAN OF CARE
Problem: Adult Inpatient Plan of Care  Goal: Plan of Care Review  11/19/2023 0652 by Chuck Rodriguez, RN  Outcome: Ongoing, Progressing  Flowsheets (Taken 11/19/2023 0652)  Plan of Care Reviewed With: patient  11/18/2023 2257 by Chuck Rodriguez, RN  Flowsheets (Taken 11/18/2023 2257)  Plan of Care Reviewed With:   patient   spouse  Goal: Patient-Specific Goal (Individualized)  Outcome: Ongoing, Progressing  Flowsheets (Taken 11/19/2023 0652)  Anxieties, Fears or Concerns: none  Individualized Care Needs: to be free from infection  Goal: Absence of Hospital-Acquired Illness or Injury  Outcome: Ongoing, Progressing  Intervention: Identify and Manage Fall Risk  Flowsheets (Taken 11/19/2023 0652)  Safety Promotion/Fall Prevention:   assistive device/personal item within reach   bed alarm set   high risk medications identified   medications reviewed  Intervention: Prevent Skin Injury  Flowsheets (Taken 11/19/2023 0652)  Body Position: position changed independently  Intervention: Prevent and Manage VTE (Venous Thromboembolism) Risk  Flowsheets (Taken 11/19/2023 0652)  Activity Management: Ambulated to bathroom - L4  Range of Motion: active ROM (range of motion) encouraged  Intervention: Prevent Infection  Flowsheets (Taken 11/19/2023 0652)  Infection Prevention: environmental surveillance performed  Goal: Optimal Comfort and Wellbeing  Outcome: Ongoing, Progressing  Intervention: Monitor Pain and Promote Comfort  Flowsheets (Taken 11/19/2023 0652)  Pain Management Interventions:   quiet environment facilitated   relaxation techniques promoted  Intervention: Provide Person-Centered Care  Flowsheets (Taken 11/19/2023 0652)  Trust Relationship/Rapport:   care explained   choices provided     Problem: Diabetes Comorbidity  Goal: Blood Glucose Level Within Targeted Range  Outcome: Ongoing, Progressing  Intervention: Monitor and Manage Glycemia  Flowsheets (Taken 11/19/2023 0652)  Glycemic Management: blood glucose  monitored     Problem: Fluid Imbalance (Pneumonia)  Goal: Fluid Balance  Outcome: Ongoing, Progressing     Problem: Respiratory Compromise (Pneumonia)  Goal: Effective Oxygenation and Ventilation  Outcome: Ongoing, Progressing     Problem: Hypertension Acute  Goal: Blood Pressure Within Desired Range  Outcome: Ongoing, Progressing  Intervention: Normalize Blood Pressure  Flowsheets (Taken 11/19/2023 0652)  Sensory Stimulation Regulation: quiet environment promoted  Medication Review/Management: medications reviewed     Problem: Infection (Pneumonia)  Goal: Resolution of Infection Signs and Symptoms  Outcome: Ongoing, Not Progressing  Intervention: Prevent Infection Progression  Flowsheets (Taken 11/19/2023 0652)  Fever Reduction/Comfort Measures:   fluid intake increased   lightweight clothing  Infection Management: aseptic technique maintained

## 2023-11-19 NOTE — PROGRESS NOTES
Pharmacist Renal Dose Adjustment Note    Sussy Costa is a 88 y.o. female being treated with the medication cefepime    Patient Data:    Vital Signs (Most Recent):  Temp: 97.7 °F (36.5 °C) (11/19/23 1032)  Pulse: 73 (11/19/23 1033)  Resp: 16 (11/19/23 1032)  BP: (!) 94/55 (11/19/23 1033)  SpO2: 96 % (11/19/23 1032) Vital Signs (72h Range):  Temp:  [96.4 °F (35.8 °C)-101 °F (38.3 °C)]   Pulse:  []   Resp:  [16-20]   BP: ()/(49-73)   SpO2:  [92 %-98 %]      Recent Labs   Lab 11/18/23  1434 11/19/23  0426   CREATININE 1.5* 1.4     Serum creatinine: 1.4 mg/dL 11/19/23 0426  Estimated creatinine clearance: 21.7 mL/min    Cefepime 2 g q12h changed to cefepime 2 g q24h    Pharmacist's Name: Tammy Ferreira  Pharmacist's Extension: [phone]

## 2023-11-19 NOTE — CLINICAL REVIEW
IP Sepsis Screen (most recent)       Sepsis Screen (IP) - 11/18/23 1820       Is the patient's history or complaint suggestive of a possible infection? Yes  -    Are there at least two of the following signs and symptoms present? Yes  -    Sepsis signs/symptoms - Tachycardia Tachycardia     >90  -    Sepsis signs/symptoms - WBC WBC < 4,000 or WBC > 12,000  -    Are any of the following organ dysfunction criteria present and not considered to be due to a chronic condition? Yes  -    Organ Dysfunction Criteria - O2 O2 Saturation < 95% on room air  -    Initiate Sepsis Protocol Yes  -              User Key  (r) = Recorded By, (t) = Taken By, (c) = Cosigned By      Initials Name    Nataly Duenas RN                 Protocol initiated- sepsis panel ordered-securechat sent to GINA Newberry to inform.

## 2023-11-19 NOTE — PROGRESS NOTES
[facility] Medicine  Progress Note    Patient Name: Sussy Costa  MRN: [id number]  Patient Class: IP- Inpatient   Admission Date: 11/18/2023  Length of Stay: 1 days  Attending Physician: Selene Freeman MD  Primary Care Provider: PAULA Casillas MD        Subjective:     Principal Problem:Bilateral pneumonia        HPI:  Sussy Costa is a 88 y.o. 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 Patient states that her  is currently admitted for CAP. Patient states that last night she developed new cough and abdominal discomfort which concerned her given frequent PNA episodes in the past. Patient reports abdominal pain as left sided and intermitent crampy. Denies fevers, chills, nausea, vomiting, diarrhea, constipation, chest pain, confusion.     In the ED patient afebrile and hemodynamically stable saturating in low 90's on room air. WBC 13.4. COVID/Flu negative. CXR with evidence of multifocal pna. CT abdomen with non-obstructing left sided nephrolithiasis and also non specific Mild wall thickening of the rectosigmoid colon, which could represent proctocolitis, versus under distension. Stable cystic pancreatic lesions with persistent dilatation of the main pancreatic duct. Patient started on cefepime and azithromycin (prior pseudomonas resp cultures). Admitted to the care of medicine for further evaluation and management.      Overview/Hospital Course:  Pt admitted for PNA and colitis , started on cefepime and azithro   11/19: feeling better today . K was low , wbc is trending down     Interval History: NAEON. Feeling better , improved abd pain , will continue with current abx     Review of Systems   Constitutional:   Negative for appetite change.   Respiratory:  Negative for cough and shortness of breath.    Cardiovascular:  Negative for chest pain and leg swelling.   Gastrointestinal:  Negative for abdominal distention, abdominal pain, constipation and diarrhea.   Genitourinary:  Negative for difficulty urinating and dysuria.   Neurological:  Negative for dizziness and headaches.     Objective:     Vital Signs (Most Recent):  Temp: 97.7 °F (36.5 °C) (11/19/23 1032)  Pulse: 73 (11/19/23 1033)  Resp: 16 (11/19/23 1032)  BP: (!) 94/55 (11/19/23 1033)  SpO2: 96 % (11/19/23 1032) Vital Signs (24h Range):  Temp:  [96.4 °F (35.8 °C)-101 °F (38.3 °C)] 97.7 °F (36.5 °C)  Pulse:  [] 73  Resp:  [16-20] 16  SpO2:  [92 %-98 %] 96 %  BP: ()/(49-73) 94/55     Weight: 48.5 kg (107 lb)  Body mass index is 17.81 kg/m².    Intake/Output Summary (Last 24 hours) at 11/19/2023 1039  Last data filed at 11/19/2023 0920  Gross per 24 hour   Intake 120 ml   Output --   Net 120 ml         Physical Exam  Constitutional:       Appearance: Normal appearance.   HENT:      Head: Normocephalic and atraumatic.      Mouth/Throat:      Mouth: Mucous membranes are moist.   Cardiovascular:      Rate and Rhythm: Normal rate and regular rhythm.      Pulses: Normal pulses.      Heart sounds: Normal heart sounds.   Pulmonary:      Effort: Pulmonary effort is normal.      Breath sounds: Normal breath sounds. No rales.   Abdominal:      General: Abdomen is flat. Bowel sounds are normal. There is no distension.      Palpations: Abdomen is soft.      Tenderness: There is no abdominal tenderness.   Musculoskeletal:         General: Normal range of motion.      Cervical back: Normal range of motion and neck supple.   Skin:     General: Skin is warm and dry.   Neurological:      General: No focal deficit present.      Mental Status: She is alert and oriented to person, place, and time.   Psychiatric:         Mood and Affect: Mood normal.             Significant  Labs: All pertinent labs within the past 24 hours have been reviewed.    Significant Imaging: I have reviewed all pertinent imaging results/findings within the past 24 hours.    Assessment/Plan:      * Bilateral pneumonia  - continue cefepime and azithromycin  - no resp symptoms   - follow respiratory culture  - supplemental O2 and monitor pulseOx  - further management pending clinical course and future study review      Abdominal pain  - improving prior to intervention. Suspect secondary to non-obstructing left sided nephrolithiasis though CT also with non-specific findings which could potentially represent early proctocolitis.  - abx as above  - strict I/Os  - continue to monitor    Diabetes mellitus with insulin therapy  - Basal insulin 3 units BID  ;  takes 5 units BID at home  - SSI  - hypoglycemic protocol    Carcinoid bronchial adenoma, right  - Follows with Onc and Pulm OP      Pancreatic insufficiency  - continue home CREON      Asthma, moderate persistent  - daily LABA-ICS  - singulair daily  - albuterol prn      Pancreas cyst  - stable on CT      Hyperlipidemia  - continue home statin      HTN (hypertension)  - continue home chlorthalidone and ARB (irbesartan converted to losartan for formulary)      VTE Risk Mitigation (From admission, onward)           Ordered     heparin (porcine) injection 5,000 Units  Every 8 hours         11/18/23 1902     IP VTE HIGH RISK PATIENT  Once         11/18/23 1902     Place sequential compression device  Until discontinued         11/18/23 1817                    Discharge Planning   ANTOINE:      Code Status: Full Code   Is the patient medically ready for discharge?:     Reason for patient still in hospital (select all that apply): Treatment                     Selene Freeman MD  Department of Hospital Medicine   [facility] - [facility] Surg

## 2023-11-20 ENCOUNTER — TELEPHONE (OUTPATIENT)
Dept: INTERNAL MEDICINE | Facility: CLINIC | Age: 88
End: 2023-11-20
Payer: MEDICARE

## 2023-11-20 VITALS
DIASTOLIC BLOOD PRESSURE: 54 MMHG | WEIGHT: 107 LBS | HEIGHT: 65 IN | SYSTOLIC BLOOD PRESSURE: 99 MMHG | OXYGEN SATURATION: 98 % | RESPIRATION RATE: 23 BRPM | HEART RATE: 69 BPM | TEMPERATURE: 98 F | BODY MASS INDEX: 17.83 KG/M2

## 2023-11-20 PROBLEM — J18.9 SEPSIS DUE TO PNEUMONIA: Status: ACTIVE | Noted: 2023-11-20

## 2023-11-20 PROBLEM — A41.9 SEPSIS DUE TO PNEUMONIA: Status: ACTIVE | Noted: 2023-11-20

## 2023-11-20 LAB
ALBUMIN SERPL BCP-MCNC: 2 G/DL (ref 3.5–5.2)
ALP SERPL-CCNC: 55 U/L (ref 55–135)
ALT SERPL W/O P-5'-P-CCNC: 16 U/L (ref 10–44)
ANION GAP SERPL CALC-SCNC: 6 MMOL/L (ref 8–16)
AST SERPL-CCNC: 16 U/L (ref 10–40)
BASOPHILS # BLD AUTO: 0.07 K/UL (ref 0–0.2)
BASOPHILS NFR BLD: 0.6 % (ref 0–1.9)
BILIRUB SERPL-MCNC: 0.4 MG/DL (ref 0.1–1)
BUN SERPL-MCNC: 36 MG/DL (ref 8–23)
CALCIUM SERPL-MCNC: 8.2 MG/DL (ref 8.7–10.5)
CHLORIDE SERPL-SCNC: 111 MMOL/L (ref 95–110)
CO2 SERPL-SCNC: 18 MMOL/L (ref 23–29)
CREAT SERPL-MCNC: 1.5 MG/DL (ref 0.5–1.4)
DIFFERENTIAL METHOD: ABNORMAL
EOSINOPHIL # BLD AUTO: 1.4 K/UL (ref 0–0.5)
EOSINOPHIL NFR BLD: 11.2 % (ref 0–8)
ERYTHROCYTE [DISTWIDTH] IN BLOOD BY AUTOMATED COUNT: 13.1 % (ref 11.5–14.5)
EST. GFR  (NO RACE VARIABLE): 33.5 ML/MIN/1.73 M^2
GLUCOSE SERPL-MCNC: 219 MG/DL (ref 70–110)
HCT VFR BLD AUTO: 29.3 % (ref 37–48.5)
HGB BLD-MCNC: 9.6 G/DL (ref 12–16)
IMM GRANULOCYTES # BLD AUTO: 0.09 K/UL (ref 0–0.04)
IMM GRANULOCYTES NFR BLD AUTO: 0.7 % (ref 0–0.5)
LYMPHOCYTES # BLD AUTO: 1.8 K/UL (ref 1–4.8)
LYMPHOCYTES NFR BLD: 14.7 % (ref 18–48)
MAGNESIUM SERPL-MCNC: 1.4 MG/DL (ref 1.6–2.6)
MCH RBC QN AUTO: 31.8 PG (ref 27–31)
MCHC RBC AUTO-ENTMCNC: 32.8 G/DL (ref 32–36)
MCV RBC AUTO: 97 FL (ref 82–98)
MONOCYTES # BLD AUTO: 0.9 K/UL (ref 0.3–1)
MONOCYTES NFR BLD: 7.6 % (ref 4–15)
NEUTROPHILS # BLD AUTO: 8 K/UL (ref 1.8–7.7)
NEUTROPHILS NFR BLD: 65.2 % (ref 38–73)
NRBC BLD-RTO: 0 /100 WBC
PHOSPHATE SERPL-MCNC: 1.5 MG/DL (ref 2.7–4.5)
PLATELET # BLD AUTO: 251 K/UL (ref 150–450)
PMV BLD AUTO: 12.6 FL (ref 9.2–12.9)
POCT GLUCOSE: 216 MG/DL (ref 70–110)
POTASSIUM SERPL-SCNC: 3.6 MMOL/L (ref 3.5–5.1)
PROT SERPL-MCNC: 5.2 G/DL (ref 6–8.4)
RBC # BLD AUTO: 3.02 M/UL (ref 4–5.4)
SODIUM SERPL-SCNC: 135 MMOL/L (ref 136–145)
WBC # BLD AUTO: 12.23 K/UL (ref 3.9–12.7)

## 2023-11-20 PROCEDURE — 25000003 PHARM REV CODE 250: Performed by: INTERNAL MEDICINE

## 2023-11-20 PROCEDURE — 84100 ASSAY OF PHOSPHORUS: CPT | Performed by: FAMILY MEDICINE

## 2023-11-20 PROCEDURE — 25000003 PHARM REV CODE 250: Performed by: FAMILY MEDICINE

## 2023-11-20 PROCEDURE — 94799 UNLISTED PULMONARY SVC/PX: CPT | Mod: XB

## 2023-11-20 PROCEDURE — 63600175 PHARM REV CODE 636 W HCPCS: Performed by: INTERNAL MEDICINE

## 2023-11-20 PROCEDURE — 94761 N-INVAS EAR/PLS OXIMETRY MLT: CPT

## 2023-11-20 PROCEDURE — 83735 ASSAY OF MAGNESIUM: CPT | Performed by: FAMILY MEDICINE

## 2023-11-20 PROCEDURE — 80053 COMPREHEN METABOLIC PANEL: CPT | Performed by: FAMILY MEDICINE

## 2023-11-20 PROCEDURE — 36415 COLL VENOUS BLD VENIPUNCTURE: CPT | Performed by: FAMILY MEDICINE

## 2023-11-20 PROCEDURE — 25000242 PHARM REV CODE 250 ALT 637 W/ HCPCS: Performed by: FAMILY MEDICINE

## 2023-11-20 PROCEDURE — 85025 COMPLETE CBC W/AUTO DIFF WBC: CPT | Performed by: FAMILY MEDICINE

## 2023-11-20 PROCEDURE — 63700000 PHARM REV CODE 250 ALT 637 W/O HCPCS: Performed by: FAMILY MEDICINE

## 2023-11-20 PROCEDURE — 63600175 PHARM REV CODE 636 W HCPCS: Performed by: FAMILY MEDICINE

## 2023-11-20 PROCEDURE — 94640 AIRWAY INHALATION TREATMENT: CPT

## 2023-11-20 RX ORDER — LEVOFLOXACIN 750 MG/1
750 TABLET ORAL EVERY OTHER DAY
Qty: 2 TABLET | Refills: 0 | Status: SHIPPED | OUTPATIENT
Start: 2023-11-21 | End: 2023-11-25

## 2023-11-20 RX ORDER — ATORVASTATIN CALCIUM 20 MG/1
20 TABLET, FILM COATED ORAL NIGHTLY
Qty: 90 TABLET | Refills: 0 | Status: SHIPPED | OUTPATIENT
Start: 2023-11-20 | End: 2024-02-27 | Stop reason: SDUPTHER

## 2023-11-20 RX ORDER — ALBUTEROL SULFATE 0.83 MG/ML
2.5 SOLUTION RESPIRATORY (INHALATION) EVERY 6 HOURS PRN
Qty: 75 ML | Refills: 0 | Status: SHIPPED | OUTPATIENT
Start: 2023-11-20 | End: 2024-03-30 | Stop reason: ALTCHOICE

## 2023-11-20 RX ORDER — MAGNESIUM SULFATE HEPTAHYDRATE 40 MG/ML
2 INJECTION, SOLUTION INTRAVENOUS ONCE
Status: COMPLETED | OUTPATIENT
Start: 2023-11-20 | End: 2023-11-20

## 2023-11-20 RX ORDER — CIPROFLOXACIN 500 MG/1
500 TABLET ORAL DAILY
Qty: 4 TABLET | Refills: 0 | Status: SHIPPED | OUTPATIENT
Start: 2023-11-21 | End: 2023-11-20 | Stop reason: HOSPADM

## 2023-11-20 RX ADMIN — INSULIN DETEMIR 3 UNITS: 100 INJECTION, SOLUTION SUBCUTANEOUS at 09:11

## 2023-11-20 RX ADMIN — CEFEPIME 2 G: 2 INJECTION, POWDER, FOR SOLUTION INTRAVENOUS at 05:11

## 2023-11-20 RX ADMIN — CHLORTHALIDONE 25 MG: 25 TABLET ORAL at 09:11

## 2023-11-20 RX ADMIN — AZITHROMYCIN 500 MG: 250 TABLET, FILM COATED ORAL at 09:11

## 2023-11-20 RX ADMIN — HEPARIN SODIUM 5000 UNITS: 5000 INJECTION INTRAVENOUS; SUBCUTANEOUS at 05:11

## 2023-11-20 RX ADMIN — PANCRELIPASE 2 CAPSULE: 24000; 76000; 120000 CAPSULE, DELAYED RELEASE PELLETS ORAL at 08:11

## 2023-11-20 RX ADMIN — LOSARTAN POTASSIUM 50 MG: 50 TABLET, FILM COATED ORAL at 09:11

## 2023-11-20 RX ADMIN — FLUTICASONE FUROATE AND VILANTEROL TRIFENATATE 1 PUFF: 100; 25 POWDER RESPIRATORY (INHALATION) at 10:11

## 2023-11-20 RX ADMIN — ACETAMINOPHEN 1000 MG: 500 TABLET ORAL at 12:11

## 2023-11-20 RX ADMIN — MAGNESIUM SULFATE HEPTAHYDRATE 2 G: 40 INJECTION, SOLUTION INTRAVENOUS at 11:11

## 2023-11-20 RX ADMIN — PANCRELIPASE 2 CAPSULE: 24000; 76000; 120000 CAPSULE, DELAYED RELEASE PELLETS ORAL at 11:11

## 2023-11-20 NOTE — DISCHARGE SUMMARY
[facility] Medicine  Discharge Summary      Patient Name: Sussy Costa  MRN: [id number]  MIRIAM: [id number]  Patient Class: IP- Inpatient  Admission Date: 11/18/2023  Hospital Length of Stay: 2 days  Discharge Date and Time:  11/20/2023 2:27 PM  Attending Physician: Antoni Tavarez MD   Discharging Provider: Antoni Tavarez MD  Primary Care Provider: PAULA Casillas MD  [facility] Medicine Team: [facility] Antoni Tavarez MD  Primary Care Team: [facility]    HPI:   Sussy Costa is a 88 y.o. 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 Patient states that her  is currently admitted for CAP. Patient states that last night she developed new cough and abdominal discomfort which concerned her given frequent PNA episodes in the past. Patient reports abdominal pain as left sided and intermitent crampy. Denies fevers, chills, nausea, vomiting, diarrhea, constipation, chest pain, confusion.     In the ED patient afebrile and hemodynamically stable saturating in low 90's on room air. WBC 13.4. COVID/Flu negative. CXR with evidence of multifocal pna. CT abdomen with non-obstructing left sided nephrolithiasis and also non specific Mild wall thickening of the rectosigmoid colon, which could represent proctocolitis, versus under distension. Stable cystic pancreatic lesions with persistent dilatation of the main pancreatic duct. Patient started on cefepime and azithromycin (prior pseudomonas resp cultures). Admitted to the care of medicine for further evaluation and management.      * No surgery found *      Hospital Course:   See individual problem list.     Goals of Care Treatment Preferences:  Code Status: Full Code      Consults:      Ophtho  Glaucoma        Pulmonary  * Bilateral pneumonia  - continue cefepime and azithromycin  - no resp symptoms   - follow respiratory culture  - supplemental O2 and monitor pulseOx  - further management pending clinical course and future study review  - transition to levaquin to complete 7 day course    Asthma, moderate persistent  - daily LABA-ICS  - singulair daily  - albuterol prn      Cardiac/Vascular  Hyperlipidemia  - continue home statin      HTN (hypertension)  - continue home chlorthalidone and ARB (irbesartan converted to losartan for formulary)    ID  Sepsis due to pneumonia  This patient does have evidence of infective focus  My overall impression is sepsis.  Source: Respiratory and Abdominal  Antibiotics given-   Antibiotics (72h ago, onward)      Start     Stop Route Frequency Ordered    11/20/23 0530  ceFEPIme (MAXIPIME) 2 g in dextrose 5 % in water (D5W) 100 mL IVPB (MB+)         -- IV Every 24 hours (non-standard times) 11/19/23 1411    11/18/23 1930  azithromycin tablet 500 mg         -- Oral Daily 11/18/23 1823          Latest lactate reviewed-  Recent Labs   Lab 11/18/23  2155   LACTATE 2.3*       Fluid challenge Not needed - patient is not hypotensive      Post- resuscitation assessment No - Post resuscitation assessment not needed       Will Not start Pressors- Levophed for MAP of 65  Source control achieved by: Abx    Oncology  Carcinoid bronchial adenoma, right  - Follows with Onc and Pulm OP      Endocrine  Diabetes mellitus with insulin therapy  - Basal insulin 3 units BID  ;  takes 5 units BID at home  - SSI  - hypoglycemic protocol    GI  Abdominal pain  - improving prior to intervention. Suspect secondary to non-obstructing left sided nephrolithiasis though CT also with non-specific findings which could potentially represent early proctocolitis.  - abx as above  - strict I/Os  - continue to monitor    Pancreatic insufficiency  - continue home CREON      Pancreas cyst  - stable on  CT        Final Active Diagnoses:    Diagnosis Date Noted POA    PRINCIPAL PROBLEM:  Bilateral pneumonia [J18.9] 11/18/2023 Yes    Sepsis due to pneumonia [J18.9, A41.9] 11/20/2023 Unknown    Abdominal pain [R10.9] 11/18/2023 Unknown    Diabetes mellitus with insulin therapy [E11.9, Z79.4] 09/01/2023 Not Applicable    Carcinoid bronchial adenoma, right [C7A.090] 02/03/2023 Yes    Pancreatic insufficiency [K86.89] 12/09/2022 Yes    Asthma, moderate persistent [J45.40] 10/26/2022 Yes    Pancreas cyst [K86.2] 03/18/2016 Yes    Hyperlipidemia [E78.5]  Yes    Glaucoma [H40.9]  Yes    HTN (hypertension) [I10] 09/24/2012 Yes      Problems Resolved During this Admission:       Discharged Condition: good    Disposition: Home or Self Care    Follow Up:   Follow-up Information       PAULA Casillas MD Follow up.    Specialty: Internal Medicine  Contact information:  [address] [facility] [address]  [phone]                           Patient Instructions:      Basic metabolic panel   Standing Status: Future Standing Exp. Date: 01/18/25     Ambulatory referral/consult to Nephrology   Standing Status: Future   Referral Priority: Routine Referral Type: Consultation   Referral Reason: Specialty Services Required   Requested Specialty: Nephrology   Number of Visits Requested: 1     Diet diabetic     Notify your health care provider if you experience any of the following:  temperature >100.4     Notify your health care provider if you experience any of the following:  persistent nausea and vomiting or diarrhea     Notify your health care provider if you experience any of the following:  difficulty breathing or increased cough     Notify your health care provider if you experience any of the following:  persistent dizziness, light-headedness, or visual disturbances     Notify your health care provider if you experience any of the following:  increased confusion or weakness     Activity as tolerated        Significant Diagnostic Studies: Labs: All labs within the past 24 hours have been reviewed    Pending Diagnostic Studies:       None           Medications:  Reconciled Home Medications:      Medication List        START taking these medications      dextromethorphan-guaiFENesin  mg  mg per 12 hr tablet  Commonly known as: MUCINEX DM  Take 1 tablet by mouth 2 (two) times daily as needed (cough).     levoFLOXacin 750 MG tablet  Commonly known as: LEVAQUIN  Take 1 tablet (750 mg total) by mouth every other day. for 4 days  Start taking on: 2023            CONTINUE taking these medications      ACCU-CHEK GUIDE TEST STRIPS Strp  Generic drug: blood sugar diagnostic  TEST FOUR TIMES DAILY WITH MEALS AND NIGHTLY     albuterol 2.5 mg /3 mL (0.083 %) nebulizer solution  Commonly known as: PROVENTIL  Take 3 mLs (2.5 mg total) by nebulization every 6 (six) hours as needed for Wheezing or Shortness of Breath. Rescue     atorvastatin 20 MG tablet  Commonly known as: LIPITOR  Take 1 tablet (20 mg total) by mouth every evening.     budesonide 1 mg/2 mL Nbsp  SMARTSI Vial(s) Both Nares Twice Daily     chlorthalidone 25 MG Tab  Commonly known as: HYGROTEN  Take 1 tablet (25 mg total) by mouth once daily.     CREON 24,000-76,000 -120,000 unit capsule  Generic drug: lipase-protease-amylase 24,000-76,000-120,000 units  Take 2 capsules with meals orally and 1 capsule with snacks.     diclofenac sodium 1 % Gel  Commonly known as: VOLTAREN  Apply 2 g topically 4 (four) times daily.     gabapentin 100 MG capsule  Commonly known as: NEURONTIN  Take 1 capsule (100 mg total) by mouth 3 (three) times daily.     hydrocortisone 2.5 % cream  Apply topically 2 (two) times daily. For one week only     insulin aspart U-100 100 unit/mL (3 mL) Inpn pen  Commonly known as: NovoLOG Flexpen U-100 Insulin  Inject 4 Units into the skin before breakfast AND 5 Units daily with lunch AND 4 Units daily with dinner or evening  "meal. Plus correction scale. Max TDD 30 units..     irbesartan 300 MG tablet  Commonly known as: AVAPRO  Take 1 tablet (300 mg total) by mouth every evening.     lancets Misc  Commonly known as: ACCU-CHEK FASTCLIX LANCET DRUM  CHECK BLOOD GLUCOSE FOUR TIMES DAILY     latanoprost 0.005 % ophthalmic solution  Inject into the eye. 1 Drops Ophthalmic Every evening     LEVEMIR FLEXTOUCH U100 INSULIN 100 unit/mL (3 mL) Inpn pen  Generic drug: insulin detemir U-100 (Levemir)  Inject 5 Units into the skin 2 (two) times daily.     levocetirizine 5 MG tablet  Commonly known as: XYZAL  Take 1 tablet (5 mg total) by mouth every evening.     magnesium oxide 400 mg (241.3 mg magnesium) tablet  Commonly known as: MAG-OX  Take 400 mg by mouth once daily.     montelukast 10 mg tablet  Commonly known as: SINGULAIR  Take 1 tablet (10 mg total) by mouth every evening.     NEILMED SINUS RINSE REFILL Pack  Generic drug: sodium bicarb-sodium chloride  use as directed     olopatadine 0.1 % ophthalmic solution  Commonly known as: PATANOL  Place 1 drop into both eyes 2 (two) times daily as needed. 1 Drops Ophthalmic Twice a day     pen needle, diabetic 31 gauge x 5/16" Ndle  Use with insulin pen 5 times a day     TRELEGY ELLIPTA 200-62.5-25 mcg inhaler  Generic drug: fluticasone-umeclidin-vilanter  Inhale 1 puff into the lungs once daily.     vitamin D 1000 units Tab  Commonly known as: VITAMIN D3  Take 1,000 Units by mouth once daily.            STOP taking these medications      nitrofurantoin (macrocrystal-monohydrate) 100 MG capsule  Commonly known as: MACROBID              Indwelling Lines/Drains at time of discharge:   Lines/Drains/Airways       None                   Time spent on the discharge of patient: 35 minutes         Antoni Tavarez MD  Department of Hospital Medicine  [facility] - Med Surg  "

## 2023-11-20 NOTE — TELEPHONE ENCOUNTER
Called pt and she states she is still in the hospital but needed a hosp f/u apt. I made her an apt for 12/7 and she said she would let me know if she was still in the hosp then

## 2023-11-20 NOTE — ASSESSMENT & PLAN NOTE
- continue cefepime and azithromycin  - no resp symptoms   - follow respiratory culture  - supplemental O2 and monitor pulseOx  - further management pending clinical course and future study review  - transition to levaquin to complete 7 day course

## 2023-11-20 NOTE — NURSING
Home Oxygen Evaluation    Date Performed: 2023    1) Patient's Home O2 Sat on room air, while at rest: 96        If O2 sats on room air at rest are 88% or below, patient qualifies. No additional testing needed. Document N/A in steps 2 and 3. If 89% or above, complete steps 2.      2) Patient's O2 Sat on room air while exercisin        If O2 sats on room air while exercising remain 89% or above patient does not qualify, no further testing needed Document N/A in step 3. If O2 sats on room air while exercising are 88% or below, continue to step 3.      3) Patient's O2 Sat while exercising on O2: 94 at room air       (Must show improvement from #2 for patients to qualify)    If O2 sats improve on oxygen, patient qualifies for portable oxygen. If not, the patient does not qualify.

## 2023-11-20 NOTE — PLAN OF CARE
[facility]  Discharge Final Note    Primary Care Provider: PAULA Casillas MD    Expected Discharge Date: 11/20/2023    Final Discharge Note (most recent)       Final Note - 11/20/23 1431          Final Note    Assessment Type Final Discharge Note     Anticipated Discharge Disposition Home or Self Care     Hospital Resources/Appts/Education Provided Appointments scheduled and added to AVS        Post-Acute Status    Post-Acute Authorization Other     Other Status No Post-Acute Service Needs     Discharge Delays None known at this time                     Important Message from Medicare             Contact Info       PAULA Casillas MD   Specialty: Internal Medicine   Relationship: PCP - General    [address] [facility] [address]   Phone: [phone]       Next Steps: Follow up

## 2023-11-20 NOTE — ASSESSMENT & PLAN NOTE
This patient does have evidence of infective focus  My overall impression is sepsis.  Source: Respiratory and Abdominal  Antibiotics given-   Antibiotics (72h ago, onward)      Start     Stop Route Frequency Ordered    11/20/23 0530  ceFEPIme (MAXIPIME) 2 g in dextrose 5 % in water (D5W) 100 mL IVPB (MB+)         -- IV Every 24 hours (non-standard times) 11/19/23 1411    11/18/23 1930  azithromycin tablet 500 mg         -- Oral Daily 11/18/23 1823          Latest lactate reviewed-  Recent Labs   Lab 11/18/23  2155   LACTATE 2.3*       Fluid challenge Not needed - patient is not hypotensive      Post- resuscitation assessment No - Post resuscitation assessment not needed       Will Not start Pressors- Levophed for MAP of 65  Source control achieved by: Abx

## 2023-11-20 NOTE — TELEPHONE ENCOUNTER
----- Message from Antonia Watts sent at 11/20/2023 12:00 PM CST -----  Contact: [phone]  Patient needs a Hosp follow up appt with their PCP only.       When is the next available appointment:  12/07/2023    Symptoms:      Discharge date: 11/20/2023    Needs to be seen by: Dr. Casillas     Would you prefer an answer via Avtozapert?: Phone      Comments:

## 2023-11-22 ENCOUNTER — PATIENT OUTREACH (OUTPATIENT)
Dept: ADMINISTRATIVE | Facility: CLINIC | Age: 88
End: 2023-11-22
Payer: MEDICARE

## 2023-11-22 NOTE — PROGRESS NOTES
C3 nurse spoke with Sussy Costa for a TCC post hospital discharge follow up call. The patient has a scheduled HOSFU appointment with PAULA Casillas MD on 12/07/23 @ 1100.

## 2023-11-22 NOTE — PHYSICIAN QUERY
PT Name: Sussy Costa  MR #: [id number]     DOCUMENTATION CLARIFICATION     CDS/: Nataly Champagne RN             Contact information: [email or web address]  This form is a permanent document in the medical record.    Query Date: November 22, 2023    By submitting this query, we are merely seeking further clarification of documentation.  Please utilize your independent clinical judgment when addressing the question(s) below.    The Medical Record contains the following:   Indicator Supporting Clinical Findings Location in Medical Record    Kidney (Renal) Insufficiency      Kidney (Renal) Failure/Injury      Nephrotoxic Agents     x BUN/Creatinine           GFR  11/18/23 11/19/23 11/20/23    BUN 38 (H) 33 (H) 36 (H)   Creatinine 1.5 (H) 1.4 1.5 (H)   eGFR 33.5 ! 36.4 ! 33.5 !     Bun/cr= 36/ 0.8  (previous encounter 2/3/23)  Bun/cr= 18/ 1.0  (previous encounter 5/22/23)  Bun/cr= 31/ 1.1  (previous encounter 9/24/23)   11/18-20 Lab              2/3/23 Lab  5/22/23 Lab  9/24/23 Lab    Urine: Casts         Eosinophils      Urine Output      Dehydration      Nausea/Vomiting      Dialysis/CRRT     x Treatment NS 500mL bolus 11/18    BMP  Input and output   11/18 MAR    11/18 Orders   x Other Abdominal pain - improving prior to intervention. Suspect secondary to non-obstructing left sided nephrolithiasis though CT    11/20 DC summary       [facility] approved diagnostic criteria for acute kidney injury is based on KDIGO criteria:    An increase in serum creatinine > 0.3mg/dl within 48 hours  OR  Increase in serum creatinine to > 1.5x baseline, which is known or presumed to have occurred within the prior 7 days  OR  Urine volume <0.5 ml/kg/hr for 6 hours           The clinical guidelines noted above are only a system guideline. It does not replace the providers clinical judgment.     Provider, please specify the diagnosis or diagnoses associated with above clinical findings.     [    ] Acute Kidney  Injury      Specify criteria to support diagnosis of ADARSH: _______     [   x ] Acute Renal Insufficiency  - Consider if SCr rise is transient and normalizes quickly with no efforts at real resuscitation of vital signs and perfusion     [    ] Other (please specify): _______________________________     [  ] Clinically Undetermined         Please document in your progress notes daily for the duration of treatment until resolved and include in your discharge summary.    References:   KDIGO Clinical Practice Guideline for Acute Kidney Injury. (2012, March). Retrieved October 21, 2020, from https://kdigo.org/wp-content/uploads/2016/10/GEAJT-6586-IJA-Guideline-English.pdf    PATRICIA Wahl MD, RICHARD York MD, & WELLINGTON Torres MD. (1960). Renal medullary necrosis [Abstract]. The American Journal of Medicine, 29(1), 132-156. Doi:https://www.sciencedirect.com/science/article/abs/pii/0090696322661371    WELLINGTON Blount MD, & VANCE Martinez MD, MS. (2020, June 18). Definition and staging of chronic kidney disease in adults ([id number] RICHARD Ballesteros MD, ScD & [id number] KENDAL Osorio MD, MSc, Eds.). Retrieved October 21, 2020, from https://www.RoyaltyShare.LoveSurf/contents/definition-and-staging-of-chronic-kidney-disease-in-adults?search=ckd%20staging&source=search_result&selectedTitle=1~150&usage_type=default&display_rank=1     CEASAR Treadwell MD, FACP. (2015, Ml 15). Acute kidney injury revisited. Retrieved October 21, 2020, from https://acphospitalist.org/archives/2015/06/coding-acute-kidney-injury.htm    JAZ Casillas MD. (2019, July). Renal Cortical Necrosis. Retrieved October 21, 2020, from https://www.[employer]/professional/genitourinary-disorders/renovascular-disorders/renal-cortical-necrosis    Form No. 45836

## 2023-11-22 NOTE — PROGRESS NOTES
C3 nurse attempted to contact Sussy Costa for a TCC post hospital discharge follow up call. No answer. Left voicemail with callback information. The patient has a scheduled HOSFU appointment with PAULA Casillas MD on 12/07/23 @ 1100.

## 2023-11-23 LAB
BACTERIA BLD CULT: NORMAL
BACTERIA BLD CULT: NORMAL

## 2023-11-30 DIAGNOSIS — E11.29 TYPE 2 DIABETES MELLITUS WITH MICROALBUMINURIA, WITHOUT LONG-TERM CURRENT USE OF INSULIN: ICD-10-CM

## 2023-11-30 DIAGNOSIS — R80.9 TYPE 2 DIABETES MELLITUS WITH MICROALBUMINURIA, WITHOUT LONG-TERM CURRENT USE OF INSULIN: ICD-10-CM

## 2023-11-30 RX ORDER — BLOOD SUGAR DIAGNOSTIC
STRIP MISCELLANEOUS
Qty: 200 STRIP | Refills: 11 | Status: ON HOLD | OUTPATIENT
Start: 2023-11-30 | End: 2024-04-02 | Stop reason: HOSPADM

## 2023-12-01 LAB
ACID FAST MOD KINY STN SPEC: ABNORMAL
MYCOBACTERIUM SPEC QL CULT: ABNORMAL

## 2023-12-03 DIAGNOSIS — Z71.89 COMPLEX CARE COORDINATION: ICD-10-CM

## 2023-12-07 ENCOUNTER — OFFICE VISIT (OUTPATIENT)
Dept: INTERNAL MEDICINE | Facility: CLINIC | Age: 88
End: 2023-12-07
Payer: MEDICARE

## 2023-12-07 VITALS
HEART RATE: 88 BPM | OXYGEN SATURATION: 96 % | TEMPERATURE: 98 F | DIASTOLIC BLOOD PRESSURE: 72 MMHG | RESPIRATION RATE: 18 BRPM | SYSTOLIC BLOOD PRESSURE: 120 MMHG | BODY MASS INDEX: 18.52 KG/M2 | WEIGHT: 111.13 LBS | HEIGHT: 65 IN

## 2023-12-07 DIAGNOSIS — Z79.4 DIABETES MELLITUS WITH INSULIN THERAPY: ICD-10-CM

## 2023-12-07 DIAGNOSIS — I15.2 HYPERTENSION ASSOCIATED WITH DIABETES: ICD-10-CM

## 2023-12-07 DIAGNOSIS — N18.30 STAGE 3 CHRONIC KIDNEY DISEASE, UNSPECIFIED WHETHER STAGE 3A OR 3B CKD: ICD-10-CM

## 2023-12-07 DIAGNOSIS — J18.9 COMMUNITY ACQUIRED PNEUMONIA, UNSPECIFIED LATERALITY: Primary | ICD-10-CM

## 2023-12-07 DIAGNOSIS — E11.9 DIABETES MELLITUS WITH INSULIN THERAPY: ICD-10-CM

## 2023-12-07 DIAGNOSIS — E11.59 HYPERTENSION ASSOCIATED WITH DIABETES: ICD-10-CM

## 2023-12-07 PROCEDURE — 99214 PR OFFICE/OUTPT VISIT, EST, LEVL IV, 30-39 MIN: ICD-10-PCS | Mod: S$PBB,,, | Performed by: INTERNAL MEDICINE

## 2023-12-07 PROCEDURE — 99999 PR PBB SHADOW E&M-EST. PATIENT-LVL V: CPT | Mod: PBBFAC,,, | Performed by: INTERNAL MEDICINE

## 2023-12-07 PROCEDURE — 99215 OFFICE O/P EST HI 40 MIN: CPT | Mod: PBBFAC,PO | Performed by: INTERNAL MEDICINE

## 2023-12-07 PROCEDURE — 99999 PR PBB SHADOW E&M-EST. PATIENT-LVL V: ICD-10-PCS | Mod: PBBFAC,,, | Performed by: INTERNAL MEDICINE

## 2023-12-07 PROCEDURE — 99214 OFFICE O/P EST MOD 30 MIN: CPT | Mod: S$PBB,,, | Performed by: INTERNAL MEDICINE

## 2023-12-07 NOTE — PROGRESS NOTES
History of present illness:   [age] year lady in today following up on recent hospital stay from November 18th through November 20, 2023.  She was admitted with cough and found to have some patchy infiltrates.  COVID negative.  Treated with appropriate IV antibiotics and had uneventful short stay with clinical improvement.  She has hypertension, diabetes mellitus, asthma history of colon cancer and others.  She reports at this time as she feels back to her baseline.  She is not having any fever chills or general body aches.  No significant shortness of breath.  No chest pain palpitations.      Current medications:  Medications are all noted and reviewed.      Review of systems:  Constitutional:  No fever no chills no generalized body aches.    Respiratory: No significant cough no wheezing no overt shortness of breath.    Cardiovascular:  Denies chest pain palpitations syncope or presyncope and no significant edema.    GI:  Denies nausea vomiting abdominal pain or diarrhea.    Physical examination:   General:  Pleasant alert appropriately groomed lady no acute distress.    Vital signs:  All noted and reviewed is normal.    Eyes: Sclerae white and nonicteric.    HEENT:  Neck supple no masses  Lungs:  Clear to auscultation.    Cardiovascular: Regular rate rhythm.  1/6 systolic murmur.  No JVD.  There is trace pretibial edema on the left.  Mental status:  Alert oriented affect mood are all appropriate.    Data:  Reviewed the recent discharge summary.  Reviewed the radiographic imaging reports and lab data.  Noted that her CKD numbers blair just a bit during hospital stay suspect related to her illness.      Impression:   Recent community-acquired bronchopneumonia clinically improved/resolved.  Reactive airway disease currently controlled and quiescent.    Hypertension controlled.    CKD 3  Other chronic medical conditions as noted in the electronic medical record history sections stable.      Plan:  Continue  current pharmacologic regimens.    He does have a follow-up visit with pulmonology next month and lab data scheduled which will recheck her chemistries.    Return clinic for primary care in 4-6 months.

## 2023-12-15 DIAGNOSIS — R80.9 TYPE 2 DIABETES MELLITUS WITH MICROALBUMINURIA, WITHOUT LONG-TERM CURRENT USE OF INSULIN: ICD-10-CM

## 2023-12-15 DIAGNOSIS — E11.29 TYPE 2 DIABETES MELLITUS WITH MICROALBUMINURIA, WITHOUT LONG-TERM CURRENT USE OF INSULIN: ICD-10-CM

## 2023-12-15 RX ORDER — LANCETS
EACH MISCELLANEOUS
Qty: 200 EACH | Refills: 3 | Status: SHIPPED | OUTPATIENT
Start: 2023-12-15 | End: 2023-12-18 | Stop reason: SDUPTHER

## 2023-12-18 ENCOUNTER — TELEPHONE (OUTPATIENT)
Dept: INTERNAL MEDICINE | Facility: CLINIC | Age: 88
End: 2023-12-18
Payer: MEDICARE

## 2023-12-18 DIAGNOSIS — R80.9 TYPE 2 DIABETES MELLITUS WITH MICROALBUMINURIA, WITHOUT LONG-TERM CURRENT USE OF INSULIN: ICD-10-CM

## 2023-12-18 DIAGNOSIS — E11.29 TYPE 2 DIABETES MELLITUS WITH MICROALBUMINURIA, WITHOUT LONG-TERM CURRENT USE OF INSULIN: ICD-10-CM

## 2023-12-18 RX ORDER — LANCETS
EACH MISCELLANEOUS
Qty: 200 EACH | Refills: 3 | Status: ON HOLD | OUTPATIENT
Start: 2023-12-18 | End: 2024-04-02 | Stop reason: HOSPADM

## 2023-12-18 NOTE — TELEPHONE ENCOUNTER
Please let her know my panel is just about full so she will have to take the next available new patient appt or choose another PCP

## 2023-12-18 NOTE — TELEPHONE ENCOUNTER
----- Message from Edwige Moreno sent at 12/18/2023 12:03 PM CST -----  Contact: Sussy [phone]  Caller is requesting an earlier appointment than what we can offer.  Caller declined first available appointment listed below.  Caller will not accept being placed on the waitlist and is requesting a message be sent to doctor.    Did you offer to schedule the next available appt and put the patient on the wait list:  n/a    When is the first available appointment: 07/2024    Preference of timeframe to be scheduled: asap      Symptoms: pt's pcp is retiring and pt would like to become EP with the provider    Would the patient prefer a call back or a response via [email or web address]:  call back    Additional Information:  Sussy's PCP provider is retiring sooner and the pt would like to schedule an appt with the provider to est care. Please call Sussy back for advice.

## 2023-12-20 ENCOUNTER — TELEPHONE (OUTPATIENT)
Dept: INTERNAL MEDICINE | Facility: CLINIC | Age: 88
End: 2023-12-20
Payer: MEDICARE

## 2023-12-20 ENCOUNTER — TELEPHONE (OUTPATIENT)
Dept: PULMONOLOGY | Facility: CLINIC | Age: 88
End: 2023-12-20
Payer: MEDICARE

## 2023-12-20 RX ORDER — AZITHROMYCIN 250 MG/1
TABLET, FILM COATED ORAL
Qty: 6 TABLET | Refills: 0 | Status: SHIPPED | OUTPATIENT
Start: 2023-12-20 | End: 2023-12-25

## 2023-12-20 RX ORDER — PREDNISONE 20 MG/1
TABLET ORAL
Qty: 6 TABLET | Refills: 0 | Status: SHIPPED | OUTPATIENT
Start: 2023-12-20 | End: 2024-02-06 | Stop reason: ALTCHOICE

## 2023-12-20 NOTE — TELEPHONE ENCOUNTER
----- Message from Stefanie Odom sent at 12/20/2023  1:21 PM CST -----  Contact: [phone]  [email or web address]     Patient is calling for Medical Advice regarding: congestion,  cold, and grey mucus     How long has patient had these symptoms: last  week     Pharmacy name and phone#:   [facility] - [place], [place] - [address] [facility]  [address] [place] [address]  Phone: [phone] Fax: [phone]      Would like response via SpeakWorkshart:  call     Comments:    Pt called to advise that she believes she has a cold that she acquired last week. She says she has been taking Mucinex over the counter as she has been dealing with congestion. She says she has grey mucus and it has yet to clear up. She contacted her pulmonologist who is currently out of the office. She was told to contact her PCP/ Pt has scheduled an appointment to [place] care; however that appointment has not come up as of yet and though did not want to bother the provider is in need of something to help clear up this congestion. She would like to know if she could get an RX for prednisone sent to the above pharmacy. Please Advise.

## 2023-12-20 NOTE — TELEPHONE ENCOUNTER
----- Message from Gabrielle Macedo sent at 12/20/2023 12:27 PM CST -----  Regarding: call back  Contact: [phone]  Pt calling in requesting call back pt is not feeling please call to discuss  further

## 2023-12-20 NOTE — TELEPHONE ENCOUNTER
Call was return to patient. Patient stated she's been having a cough for a week but will reach out to her PCP. I advised patient if she have any questions or concerns to contact the office. Office number was provided.

## 2023-12-20 NOTE — TELEPHONE ENCOUNTER
Spoke to pt and she c/o a cold that she has had since last week. She has a cough with a lot of mucus, and chest congestion. She denies having fever, sore throat, or body aches.  She has not tested for COVID or the Flu.   She is requesting a Rx for the cough and congestion  Please advise

## 2023-12-22 NOTE — PROCEDURES
Procedures       PROCEDURE NOTE:  Nasal endoscopy   Preprocedure diagnosis:  Chronic sinusitis  Postprocedure diangosis:  Same  Complications:  None  Blood Loss:  None    Procedure in detail:  After verbal consent was obtained, the patient's nasal cavity was anesthesized using topical 1%lidocaine and Neosynepherine.  A rigid 0 degree endoscope was placed in first the right, then the left nasal cavity.  The inferior and middle turbinates were examined, and found to be boggy with edema  Bilaterally- slight polypoid appearance.   The middle meatus and maxillary antrum was also examined, and found to be patent bilaterally. Sphenoidotomy patent bilaterally.  No purulent drainage or masses seen.    The patient tolerated the procedure well and there were no complications.       Will continue current medication as prescribed. Symptoms under control

## 2024-01-03 ENCOUNTER — LAB VISIT (OUTPATIENT)
Dept: LAB | Facility: HOSPITAL | Age: 89
End: 2024-01-03
Payer: MEDICARE

## 2024-01-03 DIAGNOSIS — R80.9 TYPE 2 DIABETES MELLITUS WITH MICROALBUMINURIA, WITHOUT LONG-TERM CURRENT USE OF INSULIN: ICD-10-CM

## 2024-01-03 DIAGNOSIS — E11.29 TYPE 2 DIABETES MELLITUS WITH MICROALBUMINURIA, WITHOUT LONG-TERM CURRENT USE OF INSULIN: ICD-10-CM

## 2024-01-03 LAB
ALBUMIN SERPL BCP-MCNC: 3.2 G/DL (ref 3.5–5.2)
ALP SERPL-CCNC: 48 U/L (ref 55–135)
ALT SERPL W/O P-5'-P-CCNC: 43 U/L (ref 10–44)
ANION GAP SERPL CALC-SCNC: 9 MMOL/L (ref 8–16)
AST SERPL-CCNC: 36 U/L (ref 10–40)
BILIRUB SERPL-MCNC: 0.4 MG/DL (ref 0.1–1)
BUN SERPL-MCNC: 27 MG/DL (ref 8–23)
CALCIUM SERPL-MCNC: 9.8 MG/DL (ref 8.7–10.5)
CHLORIDE SERPL-SCNC: 109 MMOL/L (ref 95–110)
CO2 SERPL-SCNC: 24 MMOL/L (ref 23–29)
CREAT SERPL-MCNC: 1.2 MG/DL (ref 0.5–1.4)
EST. GFR  (NO RACE VARIABLE): 43.8 ML/MIN/1.73 M^2
ESTIMATED AVG GLUCOSE: 180 MG/DL (ref 68–131)
GLUCOSE SERPL-MCNC: 93 MG/DL (ref 70–110)
HBA1C MFR BLD: 7.9 % (ref 4–5.6)
POTASSIUM SERPL-SCNC: 3.9 MMOL/L (ref 3.5–5.1)
PROT SERPL-MCNC: 6.2 G/DL (ref 6–8.4)
SODIUM SERPL-SCNC: 142 MMOL/L (ref 136–145)

## 2024-01-03 PROCEDURE — 80053 COMPREHEN METABOLIC PANEL: CPT | Performed by: NURSE PRACTITIONER

## 2024-01-03 PROCEDURE — 83036 HEMOGLOBIN GLYCOSYLATED A1C: CPT | Performed by: NURSE PRACTITIONER

## 2024-01-03 PROCEDURE — 36415 COLL VENOUS BLD VENIPUNCTURE: CPT | Performed by: NURSE PRACTITIONER

## 2024-01-05 NOTE — PROGRESS NOTES
Subjective:      Patient ID: Sussy Costa is a 88 y.o. female.    Chief Complaint:  Osteoporosis    History of Present Illness  Sussy Costa is here for follow up of DM and Osteoporosis.  Previously seen by me 9/2023.      With regards to diabetes:     Latest Reference Range & Units 03/08/22 10:13   Glucose 70 - 110 mg/dL 207 (H)   C-Peptide 0.78 - 5.19 ng/mL 0.77 (L)   (H): Data is abnormally high  (L): Data is abnormally low  DE: 8/2021  Known complications:  DKA Denies   RN Denies  Eye Exam: 10/2023  PN Yes  Podiatry: 5/2023  Nephropathy Denies  CAD Denies  Reports history of pancreatitis - on Creon.     Diet/Exercise: reports she had been eating more during the holidays   Eats 3 meals a day.   Snacks : occasionally before bed  Drinks : water, soft drinks (diet or regular)   Exercise - tries to stay active   Recent illness, injury, steroids: 11/2023 Pneumonia + steroid      Current regimen:  Levemir 5 units twice daily   Novolog 4-5-4 units before meals   Add correction scale if needed.   Blood sugar 180 to 230 add 1 units   Blood sugar 231 to 280 add 2 units   Blood sugar greater than 281 add 3 units     Reports compliance.     Other medications tried:  None.     Glucose Monitor:   4 times a day testing  Log reviewed: log provided and reviewed, scanned in media tab            Hypoglycemia:  Rare.  Knows how to correct with 15 grams of carbs- juice, coke, or a peppermint.       Diabetes Management Status    Hemoglobin A1C   Date Value Ref Range Status   01/03/2024 7.9 (H) 4.0 - 5.6 % Final     Comment:     ADA Screening Guidelines:  5.7-6.4%  Consistent with prediabetes  >or=6.5%  Consistent with diabetes    High levels of fetal hemoglobin interfere with the HbA1C  assay. Heterozygous hemoglobin variants (HbS, HgC, etc)do  not significantly interfere with this assay.   However, presence of multiple variants may affect accuracy.     09/19/2023 7.1 (H) 4.0 - 5.6 % Final     Comment:     ADA Screening  Guidelines:  5.7-6.4%  Consistent with prediabetes  >or=6.5%  Consistent with diabetes    High levels of fetal hemoglobin interfere with the HbA1C  assay. Heterozygous hemoglobin variants (HbS, HgC, etc)do  not significantly interfere with this assay.   However, presence of multiple variants may affect accuracy.     05/22/2023 7.4 (H) 4.0 - 5.6 % Final     Comment:     ADA Screening Guidelines:  5.7-6.4%  Consistent with prediabetes  >or=6.5%  Consistent with diabetes    High levels of fetal hemoglobin interfere with the HbA1C  assay. Heterozygous hemoglobin variants (HbS, HgC, etc)do  not significantly interfere with this assay.   However, presence of multiple variants may affect accuracy.         Statin: Taking  ACE/ARB: Taking  Screening or Prevention Patient's value Goal Complete/Controlled?   HgA1C Testing and Control   Lab Results   Component Value Date    HGBA1C 7.9 (H) 01/03/2024      Annually/Less than 8% Yes     Lipid profile : 09/19/2023 Annually Yes     LDL control Lab Results   Component Value Date    LDLCALC 62.8 (L) 09/19/2023    Annually/Less than 100 mg/dl  Yes     Nephropathy screening Lab Results   Component Value Date    LABMICR 15.0 07/26/2019     Lab Results   Component Value Date    PROTEINUA Negative 11/18/2023    Annually Yes     Blood pressure BP Readings from Last 1 Encounters:   01/10/24 120/74    Less than 140/90 No     Dilated retinal exam : 04/03/2023 Annually No     Foot exam   Most Recent Foot Exam Date: Not Found Annually Yes       With regards to osteoporosis:     First diagnosed with osteoporosis in 2007.    BMD:   11/7/2023  Impression:  *Osteoporosis on treatment with Denosumab.  *Fracture risk is very high due to calculated 10 year risk of hip fracture >4.5% (FRAX)  *Compared with previous DXA, BMD at the lumbar spine has remained stable, and BMD at the total hip has declined by 8.2%.  RECOMMENDATIONS:  *Daily calcium intake 1200 mg, dietary sources preferred; Vitamin D  800-1000  *Weight bearing exercise and fall precautions.  *Given very high fracture risk, and significant decline in the hip BMD on denosumab would consider a 12 month treatment course of romosozumab followed by resumption of denosumab.  Depending on contraindications.  *Repeat BMD in 2 years  *If not recently completed, would recommend lateral thoracic and lumbar x-rays looking for prevalent fractures.    Medications:   Actonel 8/2018 - 12/2021  She was given first dose of Prolia in 2/2018 and developed joint pains and lower ext edema so it was decided that she would not receive another dose of Prolia. We decided against Reclast for the same reason and instead restarted her on an oral bisphosphonate which she has tolerated without side effects. After her last visit 12/2021 we decided to stop the oral bisphosphonate and try Prolia again. She received Prolia  in 1/2022 without any side effects.  Last Dose: 8/14/2023    Calcium intake: dietary sources   Vit D intake: OTC Vit D3 1000iu daily     Weight bearing exercise: walking   Falls: Denies  Fractures: Denies   Significant height loss (>2 inches): ~ 1/2  inch     Family history: Denies    Menopause: 56y.o.  No HRT.    Tobacco Use: Denies  Alcohol Use: Denies  Glucocorticoid History: Prednisone over the years for asthma.   Anticoagulant Use: Denies  GERD/PPI Use: Denies  History of Malabsorption: Yes  Antiseizure Medications: Denies  History of Thyroid Disease: Denies  Kidney Stones/ Kidney Disease: Denies  Personal history of kidney stones: Denies  Family history of kidney stones: Denies  Family history of bone disease or fracture: Denies    Denies point tenderness along spine  Denies bilateral hip tenderness  Gait - steady without the use of assistive device     Lab Results   Component Value Date    CALCIUM 9.8 01/03/2024    PHOS 1.5 (L) 11/20/2023     Vit D, 25-Hydroxy   Date Value Ref Range Status   05/22/2023 32 30 - 96 ng/mL Final     Comment:     Vitamin D  "deficiency.........<10 ng/mL                              Vitamin D insufficiency......10-29 ng/mL       Vitamin D sufficiency........> or equal to 30 ng/mL  Vitamin D toxicity............>100 ng/mL         Review of Systems  As above    Objective:   Physical Exam  Vitals reviewed.   Cardiovascular:      Rate and Rhythm: Normal rate.      Comments: No edema present  Pulmonary:      Effort: Pulmonary effort is normal.   Abdominal:      Palpations: Abdomen is soft.         Visit Vitals  /74   Ht 5' 5" (1.651 m)   Wt 49 kg (108 lb 2.2 oz)   BMI 17.99 kg/m²             Body mass index is 17.99 kg/m².    Lab Review:   Lab Results   Component Value Date    HGBA1C 7.9 (H) 01/03/2024    HGBA1C 7.1 (H) 09/19/2023    HGBA1C 7.4 (H) 05/22/2023       Lab Results   Component Value Date    CHOL 124 09/19/2023    HDL 53 09/19/2023    LDLCALC 62.8 (L) 09/19/2023    TRIG 41 09/19/2023    CHOLHDL 42.7 09/19/2023     Lab Results   Component Value Date     01/03/2024    K 3.9 01/03/2024     01/03/2024    CO2 24 01/03/2024    GLU 93 01/03/2024    BUN 27 (H) 01/03/2024    CREATININE 1.2 01/03/2024    CALCIUM 9.8 01/03/2024    PROT 6.2 01/03/2024    ALBUMIN 3.2 (L) 01/03/2024    BILITOT 0.4 01/03/2024    ALKPHOS 48 (L) 01/03/2024    AST 36 01/03/2024    ALT 43 01/03/2024    ANIONGAP 9 01/03/2024    ESTGFRAFRICA >60.0 07/11/2022    EGFRNONAA >60.0 07/11/2022    TSH 3.743 09/19/2023     Vit D, 25-Hydroxy   Date Value Ref Range Status   05/22/2023 32 30 - 96 ng/mL Final     Comment:     Vitamin D deficiency.........<10 ng/mL                              Vitamin D insufficiency......10-29 ng/mL       Vitamin D sufficiency........> or equal to 30 ng/mL  Vitamin D toxicity............>100 ng/mL       Assessment and Plan     1. Type 2 diabetes mellitus with microalbuminuria, without long-term current use of insulin  insulin detemir U-100, Levemir, (LEVEMIR FLEXTOUCH U100 INSULIN) 100 unit/mL (3 mL) InPn pen    Hemoglobin A1C    " Basic Metabolic Panel      2. Senile osteoporosis            Type 2 diabetes mellitus with microalbuminuria, without long-term current use of insulin  -- Labs prior to follow up.  -- History of chronic pancreatitis. Low C peptide - manage as T1DM.  -- Brittle diabetic.  -- A1c goal < or = 8%.  -- Medications discussed:  Insulin   -- Reviewed logs/CGM:  Instructed to send glucose logs in 7-14 days.  Reach out to me sooner for any glucose <80 or consistently >200.  Not interested in personal CGM.  -- Medication Changes:   Levemir 6 units in AM and 5 units in the PM   Novolog 4-5-4 units before meals   Add correction scale if needed.   Blood sugar 180 to 230 add 1 units   Blood sugar 231 to 280 add 2 units   Blood sugar greater than 281 add 3 units     -- Reviewed goals of therapy are to get the best control we can without hypoglycemia.  -- Reviewed patient's current insulin regimen. Clarified proper insulin dose and timing in relation to meals, etc. Insulin injection sites and proper rotation instructed.   -- Advised frequent self blood glucose monitoring.  Patient encouraged to document glucose results and bring them to every clinic visit.  -- Hypoglycemia precautions discussed. Instructed on precautions before driving.    -- Call for Bg repeatedly < 90 or > 180.   -- Close adherence to lifestyle changes recommended.   -- Periodic follow ups for eye evaluations, foot care and dental care suggested.    Senile osteoporosis  -- Risks include low weight,  race, menopause, prior chronic glucocorticoid use.  -- Reviewed basics of quantity versus quality.  -- Reassuring they are not fracturing.  -- Recommend:  -Pharmacological therapy is recommended for patients with osteopenia if the 10 year probability of a hip fracture is >3% and 10 year probability of other major osteoporotic fractures is >20%.  Treatment options and potential side effects discussed for PO bisphosphonates, reclast, Denosumab, and Teriparatide.    -Treatment:   Actonel 8/2018 - 12/2021  She was given first dose of Prolia in 2/2018 and developed joint pains and lower ext edema so it was decided that she would not receive another dose of Prolia. We decided against Reclast for the same reason and instead restarted her on an oral bisphosphonate which she has tolerated without side effects. After her last visit 12/2021 we decided to stop the oral bisphosphonate and try Prolia again. She received Prolia  in 1/2022 without any side effects.  Last Dose: 8/2023  Continue Prolia every 6 months.  -Calcium and Vitamin D RDD provided.  -Exercise: recommended..  -Fall precautions made in the home.  -Alerted that if dental work needs to be done it should be done prior to initiating therapy. Dental health: UTD.  -- Repeat DEXA scan 11/2024. Reviewed BMD from 2023 noting decline - discussed switching to anabolic vs continuing - will reconsider next visit.             Follow up in about 4 months (around 5/10/2024).

## 2024-01-10 ENCOUNTER — OFFICE VISIT (OUTPATIENT)
Dept: ENDOCRINOLOGY | Facility: CLINIC | Age: 89
End: 2024-01-10
Payer: MEDICARE

## 2024-01-10 VITALS
SYSTOLIC BLOOD PRESSURE: 120 MMHG | WEIGHT: 108.13 LBS | HEIGHT: 65 IN | DIASTOLIC BLOOD PRESSURE: 74 MMHG | BODY MASS INDEX: 18.02 KG/M2

## 2024-01-10 DIAGNOSIS — M81.0 SENILE OSTEOPOROSIS: ICD-10-CM

## 2024-01-10 DIAGNOSIS — R80.9 TYPE 2 DIABETES MELLITUS WITH MICROALBUMINURIA, WITHOUT LONG-TERM CURRENT USE OF INSULIN: Primary | ICD-10-CM

## 2024-01-10 DIAGNOSIS — E11.29 TYPE 2 DIABETES MELLITUS WITH MICROALBUMINURIA, WITHOUT LONG-TERM CURRENT USE OF INSULIN: Primary | ICD-10-CM

## 2024-01-10 PROCEDURE — 99214 OFFICE O/P EST MOD 30 MIN: CPT | Mod: S$PBB,,, | Performed by: NURSE PRACTITIONER

## 2024-01-10 PROCEDURE — 99215 OFFICE O/P EST HI 40 MIN: CPT | Mod: PBBFAC | Performed by: NURSE PRACTITIONER

## 2024-01-10 PROCEDURE — 99999 PR PBB SHADOW E&M-EST. PATIENT-LVL V: CPT | Mod: PBBFAC,,, | Performed by: NURSE PRACTITIONER

## 2024-01-10 RX ORDER — INSULIN DETEMIR 100 [IU]/ML
INJECTION, SOLUTION SUBCUTANEOUS
Qty: 15 ML | Refills: 3 | Status: SHIPPED | OUTPATIENT
Start: 2024-01-10

## 2024-01-10 NOTE — PATIENT INSTRUCTIONS
Levemir 6 units in AM and 5 units in the PM   Novolog 4-5-4 units before meals   Add correction scale if needed.   Blood sugar 180 to 230 add 1 units   Blood sugar 231 to 280 add 2 units   Blood sugar greater than 281 add 3 units     Check in with me in 1-2 weeks. Reach out to me sooner for any glucose <80 or consistently >200.

## 2024-01-10 NOTE — ASSESSMENT & PLAN NOTE
-- Labs prior to follow up.  -- History of chronic pancreatitis. Low C peptide - manage as T1DM.  -- Brittle diabetic.  -- A1c goal < or = 8%.  -- Medications discussed:  Insulin   -- Reviewed logs/CGM:  Instructed to send glucose logs in 7-14 days.  Reach out to me sooner for any glucose <80 or consistently >200.  Not interested in personal CGM.  -- Medication Changes:   Levemir 6 units in AM and 5 units in the PM   Novolog 4-5-4 units before meals   Add correction scale if needed.   Blood sugar 180 to 230 add 1 units   Blood sugar 231 to 280 add 2 units   Blood sugar greater than 281 add 3 units     -- Reviewed goals of therapy are to get the best control we can without hypoglycemia.  -- Reviewed patient's current insulin regimen. Clarified proper insulin dose and timing in relation to meals, etc. Insulin injection sites and proper rotation instructed.   -- Advised frequent self blood glucose monitoring.  Patient encouraged to document glucose results and bring them to every clinic visit.  -- Hypoglycemia precautions discussed. Instructed on precautions before driving.    -- Call for Bg repeatedly < 90 or > 180.   -- Close adherence to lifestyle changes recommended.   -- Periodic follow ups for eye evaluations, foot care and dental care suggested.

## 2024-01-10 NOTE — ASSESSMENT & PLAN NOTE
-- Risks include low weight,  race, menopause, prior chronic glucocorticoid use.  -- Reviewed basics of quantity versus quality.  -- Reassuring they are not fracturing.  -- Recommend:  -Pharmacological therapy is recommended for patients with osteopenia if the 10 year probability of a hip fracture is >3% and 10 year probability of other major osteoporotic fractures is >20%.  Treatment options and potential side effects discussed for PO bisphosphonates, reclast, Denosumab, and Teriparatide.   -Treatment:   Actonel 8/2018 - 12/2021  She was given first dose of Prolia in 2/2018 and developed joint pains and lower ext edema so it was decided that she would not receive another dose of Prolia. We decided against Reclast for the same reason and instead restarted her on an oral bisphosphonate which she has tolerated without side effects. After her last visit 12/2021 we decided to stop the oral bisphosphonate and try Prolia again. She received Prolia  in 1/2022 without any side effects.  Last Dose: 8/2023  Continue Prolia every 6 months.  -Calcium and Vitamin D RDD provided.  -Exercise: recommended..  -Fall precautions made in the home.  -Alerted that if dental work needs to be done it should be done prior to initiating therapy. Dental health: UTD.  -- Repeat DEXA scan 11/2024. Reviewed BMD from 2023 noting decline - discussed switching to anabolic vs continuing - will reconsider next visit.

## 2024-01-11 DIAGNOSIS — Z00.00 ENCOUNTER FOR MEDICARE ANNUAL WELLNESS EXAM: ICD-10-CM

## 2024-01-23 ENCOUNTER — TELEPHONE (OUTPATIENT)
Dept: GASTROENTEROLOGY | Facility: CLINIC | Age: 89
End: 2024-01-23
Payer: MEDICARE

## 2024-01-23 DIAGNOSIS — K86.1 CHRONIC PANCREATITIS, UNSPECIFIED PANCREATITIS TYPE: ICD-10-CM

## 2024-01-23 RX ORDER — PANCRELIPASE 24000; 76000; 120000 [USP'U]/1; [USP'U]/1; [USP'U]/1
CAPSULE, DELAYED RELEASE PELLETS ORAL
Qty: 300 CAPSULE | Refills: 11 | Status: SHIPPED | OUTPATIENT
Start: 2024-01-23

## 2024-01-23 NOTE — TELEPHONE ENCOUNTER
----- Message from Erin Cabral sent at 1/23/2024 11:18 AM CST -----  Regarding: New Rx request  Contact: @ [phone]  Rx Refill/Request           Is this a Refill or New Rx:  New Rx for 300 tablets     Rx Name and Strength:  lipase-protease-amylase 24,000-76,000-120,000 units (CREON) 24,000-76,000 -120,000 unit capsule (300 tablets instead of 270 tablets because the pharmacy states that they can only order them in hundreds)    Preferred Pharmacy:  [facility] - [address] - [address] [facility] AT PORTAL TO REGISTERED [facility] SITES    Communication Preference: n/a    Additional Information: 300 tablets instead of 270 tablets because the pharmacy states that they can only order them in hundreds

## 2024-01-24 ENCOUNTER — PATIENT MESSAGE (OUTPATIENT)
Dept: ENDOCRINOLOGY | Facility: CLINIC | Age: 89
End: 2024-01-24
Payer: MEDICARE

## 2024-01-29 DIAGNOSIS — I10 PRIMARY HYPERTENSION: ICD-10-CM

## 2024-01-29 RX ORDER — IRBESARTAN 300 MG/1
300 TABLET ORAL NIGHTLY
Qty: 90 TABLET | Refills: 3 | Status: ON HOLD | OUTPATIENT
Start: 2024-01-29 | End: 2024-05-02 | Stop reason: HOSPADM

## 2024-02-06 ENCOUNTER — LAB VISIT (OUTPATIENT)
Dept: LAB | Facility: HOSPITAL | Age: 89
End: 2024-02-06
Attending: HOSPITALIST
Payer: MEDICARE

## 2024-02-06 ENCOUNTER — OFFICE VISIT (OUTPATIENT)
Dept: HEMATOLOGY/ONCOLOGY | Facility: CLINIC | Age: 89
End: 2024-02-06
Payer: MEDICARE

## 2024-02-06 VITALS
RESPIRATION RATE: 18 BRPM | HEART RATE: 72 BPM | HEIGHT: 65 IN | OXYGEN SATURATION: 99 % | BODY MASS INDEX: 17.82 KG/M2 | SYSTOLIC BLOOD PRESSURE: 155 MMHG | DIASTOLIC BLOOD PRESSURE: 72 MMHG | WEIGHT: 106.94 LBS

## 2024-02-06 DIAGNOSIS — Z85.038 HISTORY OF COLON CANCER: Primary | ICD-10-CM

## 2024-02-06 DIAGNOSIS — C18.2 MALIGNANT NEOPLASM OF ASCENDING COLON: ICD-10-CM

## 2024-02-06 DIAGNOSIS — N18.32 STAGE 3B CHRONIC KIDNEY DISEASE: ICD-10-CM

## 2024-02-06 DIAGNOSIS — I10 PRIMARY HYPERTENSION: ICD-10-CM

## 2024-02-06 DIAGNOSIS — D50.0 ANEMIA DUE TO CHRONIC BLOOD LOSS: ICD-10-CM

## 2024-02-06 PROBLEM — A41.9 SEPSIS DUE TO PNEUMONIA: Status: RESOLVED | Noted: 2023-11-20 | Resolved: 2024-02-06

## 2024-02-06 PROBLEM — J18.9 SEPSIS DUE TO PNEUMONIA: Status: RESOLVED | Noted: 2023-11-20 | Resolved: 2024-02-06

## 2024-02-06 LAB
ALBUMIN SERPL BCP-MCNC: 3.4 G/DL (ref 3.5–5.2)
ALP SERPL-CCNC: 53 U/L (ref 55–135)
ALT SERPL W/O P-5'-P-CCNC: 19 U/L (ref 10–44)
ANION GAP SERPL CALC-SCNC: 7 MMOL/L (ref 8–16)
AST SERPL-CCNC: 21 U/L (ref 10–40)
BILIRUB SERPL-MCNC: 0.5 MG/DL (ref 0.1–1)
BUN SERPL-MCNC: 38 MG/DL (ref 8–23)
CALCIUM SERPL-MCNC: 10 MG/DL (ref 8.7–10.5)
CEA SERPL-MCNC: 2.6 NG/ML (ref 0–5)
CHLORIDE SERPL-SCNC: 108 MMOL/L (ref 95–110)
CO2 SERPL-SCNC: 23 MMOL/L (ref 23–29)
CREAT SERPL-MCNC: 1.7 MG/DL (ref 0.5–1.4)
ERYTHROCYTE [DISTWIDTH] IN BLOOD BY AUTOMATED COUNT: 14.1 % (ref 11.5–14.5)
EST. GFR  (NO RACE VARIABLE): 28.8 ML/MIN/1.73 M^2
FERRITIN SERPL-MCNC: 110 NG/ML (ref 20–300)
GLUCOSE SERPL-MCNC: 183 MG/DL (ref 70–110)
HCT VFR BLD AUTO: 32.4 % (ref 37–48.5)
HGB BLD-MCNC: 10.8 G/DL (ref 12–16)
IMM GRANULOCYTES # BLD AUTO: 0.02 K/UL (ref 0–0.04)
IRON SERPL-MCNC: 83 UG/DL (ref 30–160)
MCH RBC QN AUTO: 32 PG (ref 27–31)
MCHC RBC AUTO-ENTMCNC: 33.3 G/DL (ref 32–36)
MCV RBC AUTO: 96 FL (ref 82–98)
NEUTROPHILS # BLD AUTO: 5.6 K/UL (ref 1.8–7.7)
PLATELET # BLD AUTO: 230 K/UL (ref 150–450)
PMV BLD AUTO: 11.1 FL (ref 9.2–12.9)
POTASSIUM SERPL-SCNC: 4.2 MMOL/L (ref 3.5–5.1)
PROT SERPL-MCNC: 6.5 G/DL (ref 6–8.4)
RBC # BLD AUTO: 3.38 M/UL (ref 4–5.4)
SATURATED IRON: 22 % (ref 20–50)
SODIUM SERPL-SCNC: 138 MMOL/L (ref 136–145)
TOTAL IRON BINDING CAPACITY: 373 UG/DL (ref 250–450)
TRANSFERRIN SERPL-MCNC: 252 MG/DL (ref 200–375)
WBC # BLD AUTO: 9.6 K/UL (ref 3.9–12.7)

## 2024-02-06 PROCEDURE — 99999 PR PBB SHADOW E&M-EST. PATIENT-LVL V: CPT | Mod: PBBFAC,,, | Performed by: HOSPITALIST

## 2024-02-06 PROCEDURE — 99215 OFFICE O/P EST HI 40 MIN: CPT | Mod: PBBFAC | Performed by: HOSPITALIST

## 2024-02-06 PROCEDURE — 36415 COLL VENOUS BLD VENIPUNCTURE: CPT | Performed by: HOSPITALIST

## 2024-02-06 PROCEDURE — 80053 COMPREHEN METABOLIC PANEL: CPT | Performed by: HOSPITALIST

## 2024-02-06 PROCEDURE — 82728 ASSAY OF FERRITIN: CPT | Performed by: HOSPITALIST

## 2024-02-06 PROCEDURE — 82378 CARCINOEMBRYONIC ANTIGEN: CPT | Performed by: HOSPITALIST

## 2024-02-06 PROCEDURE — 99214 OFFICE O/P EST MOD 30 MIN: CPT | Mod: S$PBB,,, | Performed by: HOSPITALIST

## 2024-02-06 PROCEDURE — 83540 ASSAY OF IRON: CPT | Performed by: HOSPITALIST

## 2024-02-06 PROCEDURE — 85027 COMPLETE CBC AUTOMATED: CPT | Performed by: HOSPITALIST

## 2024-02-06 NOTE — ASSESSMENT & PLAN NOTE
- No clinical evidence of recurrence  - FU 3 months with repeat CT chest non con and MR a/p given CKD  - No plan for further colonoscopy surveillance

## 2024-02-06 NOTE — PROGRESS NOTES
MEDICAL ONCOLOGY FOLLOW-UP VISIT.     Best Contact Phone Number(s): [phone] (home)      Cancer/Stage/TNM:    Cancer Staging   History of colon cancer  Staging form: Colon and Rectum, AJCC 8th Edition  - Clinical: Stage IIB (cT4a, cN0, cM0) - Signed by Nakul English MD on 2/3/2023       Reason for visit:  Stage II CRC on surveillance    HPI: Sussy Costa is a 88 y.o. female with asthma, recurrent PNA and hypoxic respiratory failure, and DM2 who was hospitalized 10/2022 for large bowel obstruction. 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  She deferred adjuvant chemotherapy for her high risk stage II CRC.  She presents to medical oncology clinic for routine surveillance.    Interval History:     Last seen in clinic 11/18/23 - 11/20/23 for PNA. Seen by her PCP 12/7/23 and by endocrinology 1/10/24. Had did fall yesterday. Not using any supportive devices. Able to get up on her own. No headstrike. Scheduled for PT next week. Currently feels OK otherwise. Appetite is fair. No N/V. No abdominal pain and reports normal bowel movements.       Oncology History   History of colon cancer   10/25/2022 Surgery    Right hemicolectomy    Moderately differentiated MMR proficient adenocarcinoma invading into the visceral peritoneum with associated PNI, LVI, and high tumor budding. Negative margins. 0/18 LN. [id number]     10/25/2022 Imaging Significant Findings    CT a/p with contrast:     1. Circumferential wall thickening of the short segment of ascending colon and apple-core appearance with pericolic fat stranding and free fluid, resulting in significant mass effect and dilatation of the cecum up to measuring 10 cm.  Suspect obstructing ascending colon neoplasm.    2. Diffuse gastric wall thickening and enhancement suggesting  gastritis.  3. Stable innumerable pancreatic hypodense lesion.  4. Pulmonary nodules similar in size and number in comparison prior.  Metastasis not excluded.  5. Multiple hypodense lesions in the liver and a few in the spleen as well, similar to prior.  Metastasis not excluded.       12/7/2022 Imaging Significant Findings    CT chest:    1. Following resolution of left inter lobar and lower lobe bronchi mucous plugging, there is resolution of previous left lower lobe atelectasis.  However, there are many bilateral subcentimeter pulmonary nodules in both lower lobes which appear increased since a CT of the abdomen and pelvis 10/25/2022 in which there is partial imaging of the chest base.  There is a new 1.4 cm right lower lobe nodule.  2. Interval development of small volume right-sided pleural effusion.  This report was flagged in Epic as abnormal.     12/19/2022 Imaging Significant Findings    MR abdomen without evidence of intrabdominal malignancy. Ongoing chronic pancreatitis     1/24/2023 Biopsy    Biopsy of right lower lube nodule : Typical carcinoid. No mitoses noted.     2/15/2023 Notable Event      Seen by rad onc (Dr. Zmaorano) 02/15/23 regarding the carcinoid tumor in the lung- imaging felt consistent with an indolent carcinoid tumor over the last decade with more recent inflammatory nodule. Plan for ongoing surveillance.     5/1/2023 Imaging Significant Findings    CT torso: Improvement in the 1.4cm RLL nodule. This nodule was not biopsied, and was though possible more inflammatory in nature. Other subcentimeter nodules remain stable. No evidence of disease progression. No other evidence of recurrent or persistent colon cancer.     6/26/2023 Procedure    Colonoscopy  Impression:      - Patent side-to-side ileo-colonic anastomosis,                          characterized by healthy appearing mucosa.                          - Diverticulosis in the sigmoid colon.                          - Internal  "hemorrhoids.                          - No specimens collected.       Imaging Significant Findings    CT CAP  Impression:  1. Postoperative change prior right hemicolectomy without CT evidence of residual/recurrent disease.  2. Numerous scattered ground-glass and solid pulmonary nodules, not significantly changed.  No appreciable new or enlarging pulmonary nodules.  3. Cystic lesions within the pancreas with persistent dilatation of the main pancreatic duct, not significantly changed.            Physical Exam:   BP (!) 155/72 (BP Location: Right arm, Patient Position: Sitting, BP Method: Large (Automatic))   Pulse 72   Resp 18   Ht 5' 5" (1.651 m)   Wt 48.5 kg (106 lb 14.8 oz)   SpO2 99%   BMI 17.79 kg/m²      ECOG Performance Status: (foot note - ECOG PS provided by [employer]cology [employer]) 1 - Symptomatic but completely ambulatory    Physical Exam  Constitutional:       General: She is not in acute distress.     Appearance: She is normal weight.   HENT:      Head: Normocephalic.      Mouth/Throat:      Mouth: Mucous membranes are moist.      Pharynx: Oropharynx is clear.   Eyes:      General: No scleral icterus.     Conjunctiva/sclera: Conjunctivae normal.      Pupils: Pupils are equal, round, and reactive to light.   Cardiovascular:      Rate and Rhythm: Normal rate and regular rhythm.      Heart sounds: No murmur heard.  Pulmonary:      Effort: Pulmonary effort is normal. No respiratory distress.      Breath sounds: Normal breath sounds.   Abdominal:      General: There is no distension.      Palpations: Abdomen is soft.   Musculoskeletal:         General: Normal range of motion.      Cervical back: Normal range of motion and neck supple.      Right lower leg: No edema.      Left lower leg: No edema.   Lymphadenopathy:      Cervical: No cervical adenopathy.   Skin:     General: Skin is warm.      Coloration: Skin is not jaundiced.   Neurological:      General: No focal deficit present.      " Mental Status: She is alert and oriented to person, place, and time.      Motor: No weakness.   Psychiatric:         Mood and Affect: Mood normal.         Behavior: Behavior normal.         Thought Content: Thought content normal.           Labs:   Lab Results   Component Value Date     02/06/2024    K 4.2 02/06/2024    CREATININE 1.7 (H) 02/06/2024    WBC 9.60 02/06/2024    HGB 10.8 (L) 02/06/2024     02/06/2024    AST 21 02/06/2024    ALT 19 02/06/2024    ALKPHOS 53 (L) 02/06/2024    BILITOT 0.5 02/06/2024          Imaging:     CT Abdomen Pelvis With IV Contrast  Narrative: EXAMINATION:  CT ABDOMEN PELVIS WITH IV CONTRAST    CLINICAL HISTORY:  LLQ abdominal pain;    TECHNIQUE:  Axial images of the abdomen and pelvis were acquired  after the use of 75 cc [id number] IV contrast.  Coronal and sagittal reconstructions were also obtained.    COMPARISON:  CT chest abdomen pelvis 11/06/2023    FINDINGS:  Heart: Upper limits normal in size. No pericardial effusion.  Calcification at the mitral annulus.  No significant calcific coronary atherosclerosis.    Lungs: Partially visualized left basilar consolidative opacities, new from prior.  Stable 0.8 and 0.6 cm solid right lower lobe nodules.  There are numerous additional smaller solid and ground-glass nodules, similar to prior.    Liver: Normal in size and contour.  Multiple scattered subcentimeter hepatic hypodensities, similar to prior.    Gallbladder: Surgically absent.    Bile Ducts: No evidence of dilated ducts.    Pancreas: Atrophic with scattered calcifications, similar to prior.  There is dilatation of pancreatic duct measuring up to 0.8 cm, unchanged.  Stable cystic lesions in the pancreatic head measuring 1.7 cm and in the pancreatic tail measuring 1.6 cm.    Spleen: Stable subcentimeter hypodensity.    Stomach and duodenum: Small hiatal hernia.  Unremarkable.    Adrenals: Unremarkable.    Kidneys/Ureters: Normal in size and location. Normal  enhancement.  Bilateral subcentimeter renal hypodensities, too small to characterize.  Nonobstructing left renal stone, unchanged.  No hydronephrosis.  No ureteral dilatation.    Bladder: No evidence of wall thickening.    Reproductive organs: The uterus is surgically absent.    Bowel/Mesentery: Small bowel is normal in caliber with no evidence of obstruction. No evidence of inflammation or wall thickening.  Postoperative changes of right colectomy.  Mild wall thickening versus underdistention of the rectosigmoid colon.  Scattered colonic diverticulosis.    Peritoneum: No intraperitoneal free air or fluid.    Lymph nodes: No retroperitoneal lymphadenopathy.    Vasculature: No aneurysm. Extensive calcific atherosclerosis.    Abdominal wall: Unremarkable.    Bones: No acute fracture. No suspicious osseous lesions.  Osseous degenerative changes.  Grade 1 anterolisthesis of L4 on L5.  Impression: Left basilar consolidative opacities, new from prior, suggestive of pneumonia.    Mild wall thickening of the rectosigmoid colon, which could represent proctocolitis, versus under distension.    Stable cystic pancreatic lesions with persistent dilatation of the main pancreatic duct.    Nonobstructing left nephrolithiasis.    Additional findings as above    Electronically signed by resident: Angi Friend  Date:    11/18/2023  Time:    17:03    Electronically signed by: Nakul Ballesteros MD  Date:    11/18/2023  Time:    17:41  X-Ray Chest PA And Lateral  Narrative: EXAMINATION:  XR CHEST PA AND LATERAL    CLINICAL HISTORY:  Cough, unspecified    TECHNIQUE:  PA and lateral views of the chest were performed.    COMPARISON:  Chest radiograph 01/24/2023, CT thorax 11/06/2023    FINDINGS:  Clothing artifacts overlie the lower mediastinum.  Cardiomediastinal silhouette is midline with similar calcification and tortuosity of the aorta and upper limits of normal sized heart.  Pulmonary vasculature and hilar contours are within normal  limits.  Prominent senescent tracheobronchial calcifications again noted.    The lungs are well expanded.  Minimal biapical pleuroparenchymal scarring.  Scattered bandlike opacities throughout the lungs consistent with platelike scarring versus atelectasis.  New patchy subsegmental opacities at the right mid to upper lung zone and lateral left lung base concerning for multifocal pneumonia from infectious or inflammatory process.  Redemonstrated bilateral pulmonary nodules, grossly similar to previous imaging.  No sizable pleural effusion or pneumothorax.    Osseous structures appear stable without acute process seen.  Impression: New patchy subsegmental opacities at the right mid to upper lung zone and lateral left lung base concerning for multifocal pneumonia from infectious or inflammatory process.  Consider short-term follow-up chest radiography after therapy to ensure resolution.    Grossly stable additional chronic findings as above.    This report was flagged in Epic as abnormal.    Electronically signed by: Nakul Ballesteros MD  Date:    11/18/2023  Time:    16:56            Diagnoses:       1. History of colon cancer    2. Primary hypertension    3. Stage 3b chronic kidney disease                    Assessment and Plan:     1. History of colon cancer  Overview:  Right sided colon cancer diagnosed in setting of LBO requiring urgent hemicolectomy 10/25/2022 with path showing pT4aN0 disease. CT chest 12/7/22 with possible lung metastases and MR liver with indeterminate liver lesions. 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 Patient elected for surveillance.      Assessment & Plan:  - No clinical evidence of recurrence  - FU 3 months with repeat CT chest non con and MR a/p given CKD  - No plan for further colonoscopy surveillance    Orders:  -     MRI Abdomen-Pelvis w w/o Contrast (XPD); Future; Expected date: 02/06/2024  -     CT  Chest Without Contrast; Future; Expected date: 02/06/2024    2. Primary hypertension  Overview:  Home medications include chlorthalidone and irbesartan      3. Stage 3b chronic kidney disease  Assessment & Plan:  - Consider referral to nephrology  - Holding further lasix                       Route Chart for Scheduling    Med Onc Chart Routing      Follow up with physician 3 months.   Follow up with DOMONIQUE    Infusion scheduling note    Injection scheduling note    Labs CBC and CMP   Scheduling:  Preferred lab:  Lab interval:     Imaging CT chest abdomen pelvis      Pharmacy appointment    Other referrals                    Therapy Plan Information  Medications  denosumab (PROLIA) injection 60 mg  60 mg, Subcutaneous, Every 26 weeks       Nakul English MD  Hematology/Oncology  [facility]

## 2024-02-09 ENCOUNTER — PATIENT MESSAGE (OUTPATIENT)
Dept: OTOLARYNGOLOGY | Facility: CLINIC | Age: 89
End: 2024-02-09
Payer: MEDICARE

## 2024-02-10 DIAGNOSIS — I10 PRIMARY HYPERTENSION: ICD-10-CM

## 2024-02-10 RX ORDER — CHLORTHALIDONE 25 MG/1
25 TABLET ORAL DAILY
Qty: 90 TABLET | Refills: 3 | Status: CANCELLED | OUTPATIENT
Start: 2024-02-10 | End: 2025-02-09

## 2024-02-12 ENCOUNTER — PATIENT MESSAGE (OUTPATIENT)
Dept: HEMATOLOGY/ONCOLOGY | Facility: CLINIC | Age: 89
End: 2024-02-12
Payer: MEDICARE

## 2024-02-12 DIAGNOSIS — I10 PRIMARY HYPERTENSION: ICD-10-CM

## 2024-02-12 RX ORDER — LEVOCETIRIZINE DIHYDROCHLORIDE 5 MG/1
5 TABLET, FILM COATED ORAL NIGHTLY
Qty: 30 TABLET | Refills: 11 | Status: ON HOLD | OUTPATIENT
Start: 2024-02-12 | End: 2024-04-02 | Stop reason: HOSPADM

## 2024-02-12 RX ORDER — PEN NEEDLE, DIABETIC 30 GX3/16"
NEEDLE, DISPOSABLE MISCELLANEOUS
Qty: 200 EACH | Refills: 11 | Status: ON HOLD | OUTPATIENT
Start: 2024-02-12 | End: 2024-04-02 | Stop reason: HOSPADM

## 2024-02-12 RX ORDER — CHLORTHALIDONE 25 MG/1
25 TABLET ORAL DAILY
Qty: 90 TABLET | Refills: 3 | Status: SHIPPED | OUTPATIENT
Start: 2024-02-12 | End: 2024-02-14 | Stop reason: SDUPTHER

## 2024-02-12 RX ORDER — MONTELUKAST SODIUM 10 MG/1
10 TABLET ORAL NIGHTLY
Qty: 30 TABLET | Refills: 6 | Status: SHIPPED | OUTPATIENT
Start: 2024-02-12 | End: 2024-04-21 | Stop reason: SDUPTHER

## 2024-02-14 ENCOUNTER — OFFICE VISIT (OUTPATIENT)
Dept: PULMONOLOGY | Facility: CLINIC | Age: 89
End: 2024-02-14
Payer: MEDICARE

## 2024-02-14 VITALS
HEART RATE: 93 BPM | DIASTOLIC BLOOD PRESSURE: 80 MMHG | SYSTOLIC BLOOD PRESSURE: 144 MMHG | WEIGHT: 98.75 LBS | HEIGHT: 65 IN | BODY MASS INDEX: 16.45 KG/M2 | OXYGEN SATURATION: 96 %

## 2024-02-14 DIAGNOSIS — I10 PRIMARY HYPERTENSION: ICD-10-CM

## 2024-02-14 DIAGNOSIS — J45.40 MODERATE PERSISTENT ASTHMA WITHOUT COMPLICATION: Primary | ICD-10-CM

## 2024-02-14 PROBLEM — J18.9 BILATERAL PNEUMONIA: Status: RESOLVED | Noted: 2023-11-18 | Resolved: 2024-02-14

## 2024-02-14 PROCEDURE — 99215 OFFICE O/P EST HI 40 MIN: CPT | Mod: PBBFAC | Performed by: INTERNAL MEDICINE

## 2024-02-14 PROCEDURE — 99999 PR PBB SHADOW E&M-EST. PATIENT-LVL V: CPT | Mod: PBBFAC,,, | Performed by: INTERNAL MEDICINE

## 2024-02-14 PROCEDURE — 99213 OFFICE O/P EST LOW 20 MIN: CPT | Mod: S$PBB,,, | Performed by: INTERNAL MEDICINE

## 2024-02-14 RX ORDER — CHLORTHALIDONE 25 MG/1
25 TABLET ORAL DAILY
Qty: 90 TABLET | Refills: 3 | Status: ON HOLD | OUTPATIENT
Start: 2024-02-14 | End: 2024-05-02 | Stop reason: HOSPADM

## 2024-02-14 RX ORDER — FLUTICASONE FUROATE, UMECLIDINIUM BROMIDE AND VILANTEROL TRIFENATATE 200; 62.5; 25 UG/1; UG/1; UG/1
1 POWDER RESPIRATORY (INHALATION) DAILY
Qty: 3 EACH | Refills: 4 | Status: ON HOLD | OUTPATIENT
Start: 2024-02-14 | End: 2024-04-02

## 2024-02-14 NOTE — ASSESSMENT & PLAN NOTE
Continue current therapy.  Requested patient call the office if her cough worsens so that I may arrange a chest xray and possibly antibiotics.

## 2024-02-14 NOTE — PROGRESS NOTES
Subjective:       Patient ID: Sussy Costa is a 88 y.o. female.    Chief Complaint: Asthma and Cough    HPI:   Sussy Costa is a 88 y.o. female who presents for follow up of her asthma.   Last seen in the office in October of 2023.  She was admitted to the hospital for three days for treatment of CAP in November 2023.  Recently fell and had swelling and pain in her left foot.     Report that overall her respiratory status is stable.   She is happy with the Trelegy.  She has a little cough that has developed over the last few days, but feels that it's not her asthma- and more consistent with her allergies.   _______________________________________  Cough has improved since using trelegy.  Never obtained the flutter valve.  Decided to defer treatment with Fasenra.    ____________________________________  She reports chest congestion since april  Mucinex helps some, but she still has chest congestion  Previously followed by Dr. Peguero    +allergies.   Using advair twice a day.     History of carcinoid s/p surgery.  Some lung mets on imaging.    Review of Systems   Constitutional:  Negative for weight loss.   Respiratory:  Positive for cough. Negative for sputum production, shortness of breath, wheezing and dyspnea on extertion.          Social History     Tobacco Use    Smoking status: Never     Passive exposure: Never    Smokeless tobacco: Never   Substance Use Topics    Alcohol use: Yes     Comment: socially       Review of patient's allergies indicates:   Allergen Reactions    Aspirin Other (See Comments)     Asthma    TRIAD OF NASAL POLYPS, ASA  ALLERGY AND ASTHMA.        Aspirin Other (See Comments)    Nsaids (non-steroidal anti-inflammatory drug) Other (See Comments)     AVOID DUE TO TRIAD OF ASA ALLERGY, NASAL POLYPS,AND ASTHMA  AVOID DUE TO TRIAD OF ASA ALLERGY, NASAL POLYPS,AND ASTHMA    Penicillin      Other reaction(s): Rash    9/30/20: tolerates ceftriaxone    Penicillin g Other (See Comments)      Other reaction(s): Rash    9/30/20: tolerates ceftriaxone     Past Medical History:   Diagnosis Date    Asthma     Cyst of pancreas     liver    Diabetes mellitus type II     Eczema of hand     Glaucoma     History of recurrent pneumonia 9/28/2020    Hyperlipidemia     Hypertension     Lichen planus     gums    Osteoporosis     Pulmonary nodules      Past Surgical History:   Procedure Laterality Date    BRONCHOSCOPY N/A 5/13/2022    Procedure: Bronchoscopy;  Surgeon: Tina Diagnostic Provider;  [facility];  Service: Anesthesiology;  Laterality: N/A;    CATARACT EXTRACTION, BILATERAL      CHOLECYSTECTOMY      COLECTOMY, RIGHT Right 10/25/2022    Procedure: COLECTOMY, RIGHT;  Surgeon: Jass Holman MD;  [facility];  Service: Colon and Rectal;  Laterality: Right;    COLONOSCOPY N/A 6/26/2023    Procedure: COLONOSCOPY;  Surgeon: Jass Holman MD;  Location: [facility] ENDO (2ND FLR);  Service: Endoscopy;  Laterality: N/A;  2nd floor -lung disease  referral Dr [id number] portal/mailed-GT  6/20/23- Precall confirmed- [place]    ENDOSCOPIC ULTRASOUND OF UPPER GASTROINTESTINAL TRACT N/A 4/22/2022    Procedure: ULTRASOUND, UPPER GI TRACT, ENDOSCOPIC;  Surgeon: Max Rosado MD;  Location: [facility] ([facility]  Service: Endoscopy;  Laterality: N/A;  urgent EUS (linear) for abnormal CT scan with dilated PD and increased cyst of pancreas cyst.  pap 44 mmHg  4/13:fully vaccinated. instructions via portal-[facility]    EPIDURAL STEROID INJECTION INTO LUMBAR SPINE N/A 11/8/2018    Procedure: Injection-steroid-epidural-lumbar L5-S1;  Surgeon: Nicci Osorio Jr., MD;  Location: [facility] PAIN MGT;  Service: Pain Management;  Laterality: N/A;    FUNCTIONAL ENDOSCOPIC SINUS SURGERY (FESS) USING COMPUTER-ASSISTED NAVIGATION N/A 6/17/2019    Procedure: FESS, USING COMPUTER-ASSISTED NAVIGATION;  Surgeon: Rosangela Isabel MD;  Location: [facility] OR;  Service: ENT;  Laterality: N/A;  video     HYSTERECTOMY      nasal polyps       Current Outpatient Medications on File Prior to Visit   Medication Sig    ACCU-CHEK GUIDE TEST STRIPS Strp TEST FOUR TIMES DAILY WITH MEALS AND NIGHTLY    albuterol (PROVENTIL) 2.5 mg /3 mL (0.083 %) nebulizer solution Take 3 mLs (2.5 mg total) by nebulization every 6 (six) hours as needed for Wheezing or Shortness of Breath. Rescue    atorvastatin (LIPITOR) 20 MG tablet Take 1 tablet (20 mg total) by mouth every evening.    budesonide 1 mg/2 mL NbSp SMARTSI Vial(s) Both Nares Twice Daily    diclofenac sodium (VOLTAREN) 1 % Gel Apply 2 g topically 4 (four) times daily.    fluticasone-umeclidin-vilanter (TRELEGY ELLIPTA) 200-62.5-25 mcg inhaler Inhale 1 puff into the lungs once daily.    gabapentin (NEURONTIN) 100 MG capsule Take 1 capsule (100 mg total) by mouth 3 (three) times daily. (Patient not taking: Reported on 2023)    insulin aspart U-100 (NOVOLOG FLEXPEN U-100 INSULIN) 100 unit/mL (3 mL) InPn pen Inject 4 Units into the skin before breakfast AND 5 Units daily with lunch AND 4 Units daily with dinner or evening meal. Plus correction scale. Max TDD 30 units..    insulin detemir U-100, Levemir, (LEVEMIR FLEXTOUCH U100 INSULIN) 100 unit/mL (3 mL) InPn pen Inject 6 Units into the skin once daily AND 5 Units every evening.    irbesartan (AVAPRO) 300 MG tablet Take 1 tablet (300 mg total) by mouth every evening.    lancets (ACCU-CHEK FASTCLIX LANCET DRUM) [facility] CHECK BLOOD GLUCOSE FOUR TIMES DAILY    latanoprost 0.005 % ophthalmic solution Inject into the eye. 1 Drops Ophthalmic Every evening    levocetirizine (XYZAL) 5 MG tablet Take 1 tablet (5 mg total) by mouth every evening.    lipase-protease-amylase 24,000-76,000-120,000 units (CREON) 24,000-76,000 -120,000 unit capsule Take 2 capsules with meals orally and 1 capsule with snacks.    magnesium oxide (MAG-OX) 400 mg (241.3 mg magnesium) tablet Take 400 mg by mouth once daily.    montelukast (SINGULAIR) 10 mg tablet  "Take 1 tablet (10 mg total) by mouth every evening.    NEILMED SINUS RINSE REFILL Pack use as directed    olopatadine (PATANOL) 0.1 % ophthalmic solution Place 1 drop into both eyes 2 (two) times daily as needed. 1 Drops Ophthalmic Twice a day     pen needle, diabetic 31 gauge x 5/16" Ndle Use with insulin pen 5 times a day    vitamin D (VITAMIN D3) 1000 units Tab Take 1,000 Units by mouth once daily.    [DISCONTINUED] chlorthalidone (HYGROTEN) 25 MG Tab Take 1 tablet (25 mg total) by mouth once daily.    [DISCONTINUED] risedronate (ACTONEL) 35 MG tablet Take 1 tablet (35 mg total) by mouth every 7 days.     No current facility-administered medications on file prior to visit.       Objective:      There were no vitals filed for this visit.    Physical Exam   Constitutional: She is oriented to person, place, and time. She appears well-developed and well-nourished.   HENT:   Mouth/Throat: Mallampati Score: IV.   Cardiovascular: Normal rate and regular rhythm.   No murmur heard.  Pulmonary/Chest: Normal expansion and effort normal. She has wheezes (focal wheeze in left upper lung zone). She has no rhonchi.   Musculoskeletal:         General: No edema.   Neurological: She is alert and oriented to person, place, and time.   Skin: Skin is warm and dry.   Psychiatric: She has a normal mood and affect. Her behavior is normal.     Personal Diagnostic Review        2/6/2024     1:28 PM 1/10/2024     2:22 PM 12/7/2023    10:45 AM 11/20/2023    11:56 AM 11/20/2023    10:58 AM 11/20/2023     8:55 AM 11/20/2023     4:43 AM   Pulmonary Function Tests   SpO2 99 %  96 % 98 % 97 % 94 % 96 %   Height 5' 5" (1.651 m) 5' 5" (1.651 m) 5' 5" (1.651 m)       Weight 48.5 kg (106 lb 14.8 oz) 49 kg (108 lb 2.2 oz) 50.4 kg (111 lb 1.8 oz)       BMI (Calculated) 17.8 18 18.5             CT Abdomen Pelvis With IV Contrast  Narrative: EXAMINATION:  CT ABDOMEN PELVIS WITH IV CONTRAST    CLINICAL HISTORY:  LLQ abdominal pain;    TECHNIQUE:  Axial " images of the abdomen and pelvis were acquired  after the use of 75 cc [id number] IV contrast.  Coronal and sagittal reconstructions were also obtained.    COMPARISON:  CT chest abdomen pelvis 11/06/2023    FINDINGS:  Heart: Upper limits normal in size. No pericardial effusion.  Calcification at the mitral annulus.  No significant calcific coronary atherosclerosis.    Lungs: Partially visualized left basilar consolidative opacities, new from prior.  Stable 0.8 and 0.6 cm solid right lower lobe nodules.  There are numerous additional smaller solid and ground-glass nodules, similar to prior.    Liver: Normal in size and contour.  Multiple scattered subcentimeter hepatic hypodensities, similar to prior.    Gallbladder: Surgically absent.    Bile Ducts: No evidence of dilated ducts.    Pancreas: Atrophic with scattered calcifications, similar to prior.  There is dilatation of pancreatic duct measuring up to 0.8 cm, unchanged.  Stable cystic lesions in the pancreatic head measuring 1.7 cm and in the pancreatic tail measuring 1.6 cm.    Spleen: Stable subcentimeter hypodensity.    Stomach and duodenum: Small hiatal hernia.  Unremarkable.    Adrenals: Unremarkable.    Kidneys/Ureters: Normal in size and location. Normal enhancement.  Bilateral subcentimeter renal hypodensities, too small to characterize.  Nonobstructing left renal stone, unchanged.  No hydronephrosis.  No ureteral dilatation.    Bladder: No evidence of wall thickening.    Reproductive organs: The uterus is surgically absent.    Bowel/Mesentery: Small bowel is normal in caliber with no evidence of obstruction. No evidence of inflammation or wall thickening.  Postoperative changes of right colectomy.  Mild wall thickening versus underdistention of the rectosigmoid colon.  Scattered colonic diverticulosis.    Peritoneum: No intraperitoneal free air or fluid.    Lymph nodes: No retroperitoneal lymphadenopathy.    Vasculature: No aneurysm. Extensive calcific  atherosclerosis.    Abdominal wall: Unremarkable.    Bones: No acute fracture. No suspicious osseous lesions.  Osseous degenerative changes.  Grade 1 anterolisthesis of L4 on L5.  Impression: Left basilar consolidative opacities, new from prior, suggestive of pneumonia.    Mild wall thickening of the rectosigmoid colon, which could represent proctocolitis, versus under distension.    Stable cystic pancreatic lesions with persistent dilatation of the main pancreatic duct.    Nonobstructing left nephrolithiasis.    Additional findings as above    Electronically signed by resident: Angi Friend  Date:    11/18/2023  Time:    17:03    Electronically signed by: Nakul Ballesteros MD  Date:    11/18/2023  Time:    17:41  X-Ray Chest PA And Lateral  Narrative: EXAMINATION:  XR CHEST PA AND LATERAL    CLINICAL HISTORY:  Cough, unspecified    TECHNIQUE:  PA and lateral views of the chest were performed.    COMPARISON:  Chest radiograph 01/24/2023, CT thorax 11/06/2023    FINDINGS:  Clothing artifacts overlie the lower mediastinum.  Cardiomediastinal silhouette is midline with similar calcification and tortuosity of the aorta and upper limits of normal sized heart.  Pulmonary vasculature and hilar contours are within normal limits.  Prominent senescent tracheobronchial calcifications again noted.    The lungs are well expanded.  Minimal biapical pleuroparenchymal scarring.  Scattered bandlike opacities throughout the lungs consistent with platelike scarring versus atelectasis.  New patchy subsegmental opacities at the right mid to upper lung zone and lateral left lung base concerning for multifocal pneumonia from infectious or inflammatory process.  Redemonstrated bilateral pulmonary nodules, grossly similar to previous imaging.  No sizable pleural effusion or pneumothorax.    Osseous structures appear stable without acute process seen.  Impression: New patchy subsegmental opacities at the right mid to upper lung zone and  lateral left lung base concerning for multifocal pneumonia from infectious or inflammatory process.  Consider short-term follow-up chest radiography after therapy to ensure resolution.    Grossly stable additional chronic findings as above.    This report was flagged in Epic as abnormal.    Electronically signed by: Nakul Ballesteros MD  Date:    11/18/2023  Time:    16:56    Respiratory cultures:  8/22: pseudomonas (likely colonization- did not treat)  8/8/23: AFB positive with NTM (patient stopped producing sputum so 2nd sample was not obtained)  Assessment:     No orders of the defined types were placed in this encounter.    1. Asthma, unspecified asthma severity, unspecified whether complicated, unspecified whether persistent    2. Moderate persistent asthma without complication            Plan:         Problem List Items Addressed This Visit          Pulmonary    Asthma, moderate persistent    Overview     Well controlled on Trelegy, singulair, and albuterol prn.         Current Assessment & Plan     Continue current therapy.  Requested patient call the office if her cough worsens so that I may arrange a chest xray and possibly antibiotics.           Other Visit Diagnoses       Asthma, unspecified asthma severity, unspecified whether complicated, unspecified whether persistent    -  Primary    Relevant Medications    fluticasone-umeclidin-vilanter (TRELEGY ELLIPTA) 200-62.5-25 mcg inhaler

## 2024-02-16 ENCOUNTER — INFUSION (OUTPATIENT)
Dept: INFECTIOUS DISEASES | Facility: HOSPITAL | Age: 89
End: 2024-02-16
Payer: MEDICARE

## 2024-02-16 VITALS
DIASTOLIC BLOOD PRESSURE: 67 MMHG | OXYGEN SATURATION: 96 % | WEIGHT: 107.81 LBS | TEMPERATURE: 98 F | SYSTOLIC BLOOD PRESSURE: 155 MMHG | RESPIRATION RATE: 16 BRPM | HEART RATE: 82 BPM | HEIGHT: 65 IN | BODY MASS INDEX: 17.96 KG/M2

## 2024-02-16 DIAGNOSIS — M81.0 SENILE OSTEOPOROSIS: Primary | ICD-10-CM

## 2024-02-16 PROCEDURE — 96372 THER/PROPH/DIAG INJ SC/IM: CPT

## 2024-02-16 PROCEDURE — 63600175 PHARM REV CODE 636 W HCPCS: Mod: JZ,JG | Performed by: INTERNAL MEDICINE

## 2024-02-16 RX ORDER — DENOSUMAB 60 MG/ML
60 INJECTION SUBCUTANEOUS
Status: ON HOLD | COMMUNITY
End: 2024-04-02

## 2024-02-16 RX ADMIN — DENOSUMAB 60 MG: 60 INJECTION SUBCUTANEOUS at 11:02

## 2024-02-16 NOTE — PROGRESS NOTES
Pt received Prolia injection in right arm. Pt currently taking Vit D/Ca+; Pt denies any dental sx in last 3 months;     Limited head-to-toe assessment due to privacy issues and visit reason though the opportunity was given for patient to express any concerns

## 2024-02-19 ENCOUNTER — CLINICAL SUPPORT (OUTPATIENT)
Dept: REHABILITATION | Facility: HOSPITAL | Age: 89
End: 2024-02-19
Payer: MEDICARE
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
## 2024-02-19 DIAGNOSIS — M25.562 CHRONIC PAIN OF LEFT KNEE: Primary | ICD-10-CM

## 2024-02-19 DIAGNOSIS — G89.29 CHRONIC PAIN OF LEFT KNEE: Primary | ICD-10-CM

## 2024-02-19 DIAGNOSIS — R26.89 BALANCE PROBLEM: ICD-10-CM

## 2024-02-19 PROCEDURE — 97161 PT EVAL LOW COMPLEX 20 MIN: CPT

## 2024-02-19 PROCEDURE — 97112 NEUROMUSCULAR REEDUCATION: CPT

## 2024-02-20 NOTE — PLAN OF CARE
[facility]   Physical Therapy Initial Evaluation      Name: Sussy Costa  Clinic Number: [id number]    Therapy Diagnosis:   Encounter Diagnoses   Name Primary?    Fall, initial encounter     Chronic pain of left knee Yes    Balance problem         Physician: Nakul English MD    Physician Orders: PT Eval and Treat   Medical Diagnosis from Referral: W19.XXXA (ICD-10-CM) - Unspecified fall, initial encounter   Evaluation Date: 2/19/2024  Authorization Period Expiration: 02/14/2025   Plan of Care Expiration: 4/19/2024  Progress Note Due: 4/19/2024  Visit # / Visits authorized: 1/ 1   FOTO: 1/3    Precautions: Standard, Diabetes, Fall, and cancer     Time In: 1300  Time Out: 1400  Total Appointment Time (timed & untimed codes): 60 minutes    Subjective     Date of onset: 1/30/24    History of current condition - Sussy reports:     Patient reports history of multiple falls in the past year most recently at the end of January.    She reports most of the falls have occurred due to her L knee buckling on her. Most recent fall at the end of January when her knee buckled and she fell into her bed. L glute to lateral thigh was sore from hitting the bed. Her L foot/toes also bent under her and swelled up. Foot swelling has come down since there and she can fit into her shoes now. With other falls she did not have any injuries, but all occurred from L knee buckling. Denies LOC ever or history of dizziness.    She has a history of L knee pain / history of OA. Has had L knee CSI, which only lasted a few days w/o relief, but currently she is able to manage symptoms ind clyde with diclofenac. L knee pain also comes and goes. Stairs / ladder can increase pain. She does not utilize AD    PMH asthma, HBP, high cholesterol, DM Type II, Cancer 2022    PER HEM/ONC NOTE 2/6/24:    Interval History:      Last seen in clinic 11/18/23 - 11/20/23 for PNA. Seen by her PCP 12/7/23 and by endocrinology 1/10/24.  Had did fall yesterday. Not using any supportive devices. Able to get up on her own. No headstrike. Scheduled for PT next week. Currently feels OK otherwise. Appetite is fair. No N/V. No abdominal pain and reports normal bowel movements.     Falls: 3-4 falls in the past year; most recent was the worst.     Imaging: see imaging    Prior Therapy: none  Social History: lives with their spouse  Occupation: retired  Prior Level of Function: ADLs w/o complaint  Current Level of Function: L knee pain with increased falls in the past year with L knee giving way.    Pain:  Current 4/10, worst 8/10, best 4/10   Location: L knee  Description: Aching, Dull, and Giving way  Aggravating Factors: Standing, Walking, Sit to stand and Stairs  Easing Factors: nothing and rest    Patients goals: ADLs w/o complaint and increase balance     Medical History:   Past Medical History:   Diagnosis Date    Asthma     Cyst of pancreas     liver    Diabetes mellitus type II     Eczema of hand     Glaucoma     History of recurrent pneumonia 9/28/2020    Hyperlipidemia     Hypertension     Lichen planus     gums    Osteoporosis     Pulmonary nodules        Surgical History:   Sussy Costa  has a past surgical history that includes Hysterectomy; Cholecystectomy; nasal polyps; Cataract extraction, bilateral; Epidural steroid injection into lumbar spine (N/A, 11/8/2018); Functional endoscopic sinus surgery (FESS) using computer-assisted navigation (N/A, 6/17/2019); Endoscopic ultrasound of upper gastrointestinal tract (N/A, 4/22/2022); Bronchoscopy (N/A, 5/13/2022); colectomy, right (Right, 10/25/2022); and Colonoscopy (N/A, 6/26/2023).    Medications:   Sussy has a current medication list which includes the following prescription(s): accu-chek guide test strips, albuterol, atorvastatin, budesonide, chlorthalidone, prolia, diclofenac sodium, trelegy ellipta, gabapentin, insulin aspart u-100, levemir flextouch u100 insulin, irbesartan, lancets,  latanoprost, levocetirizine, creon, magnesium oxide, montelukast, neilmed sinus rinse refill, olopatadine, pen needle, diabetic, vitamin d, and [DISCONTINUED] risedronate.    Allergies:   Review of patient's allergies indicates:   Allergen Reactions    Aspirin Other (See Comments)     Asthma    TRIAD OF NASAL POLYPS, ASA  ALLERGY AND ASTHMA.        Aspirin Other (See Comments)    Nsaids (non-steroidal anti-inflammatory drug) Other (See Comments)     AVOID DUE TO TRIAD OF ASA ALLERGY, NASAL POLYPS,AND ASTHMA  AVOID DUE TO TRIAD OF ASA ALLERGY, NASAL POLYPS,AND ASTHMA    Penicillin      Other reaction(s): Rash    20: tolerates ceftriaxone    Penicillin g Other (See Comments)     Other reaction(s): Rash    20: tolerates ceftriaxone        Objective      Observation / Gait: shortened stride length with small MOLLY    Posture: WNL    Palpation: TTP L medial joint line    Range of Motion (Passive):   Knee Right  Left    Flexion WNL WNL   Extension +5 +5     Lower Extremity Strength:  Right LE  Left LE    Quadriceps: 4/5 Quadriceps: 3-/5   Hamstrings: 4/5 Hamstrings: 4/5   Hip Flexion: 3+/5 Hip Flexion: 3/5   Hip ABD: 4-/5 (supine) Hip ABD: 4-/5 (supine)   Hip EXT: 4/5 (supine) Hip EXT: 3/5 (supine)       Functional tests:   Sit to stand: leans excessively onto R LE for pushing up  30 sec sit to stand: 5 reps - use of UE  TU.69 sec    Limitation/Restriction for FOTO Non Musculoskeletal Disorder Survey    Therapist reviewed FOTO scores for Sussy Costa on 2024.   FOTO documents entered into [employer] - see Media section.    Limitation Score: see media%         Treatment     Total Treatment time (time-based codes) separate from Evaluation: 25 minutes     Sussy received the treatments listed below:      neuromuscular re-education activities to improve: Balance, Coordination, Kinesthetic, Sense, Proprioception, and Posture for 25 minutes. The following activities were included:    Patient education:  -  impairments as they relate to balance and function  - importance of quad NM activation / strength  - HEP    Quad set towel 3x10x5 sec ea stevie - cuing for form necessary  LAQ w/ towel behind knee 10x10 sec ea stevie      NEXT VISIT - Bike or Nustep, cont quad NM activation (SAQ, SLR), bridge  Future Visits - balance activities      therapeutic activities to improve functional performance for 00  minutes, including:    NT      Patient Education and Home Exercises     Education provided:   - see above    Written Home Exercises Provided: yes. Exercises were reviewed and Sussy was able to demonstrate them prior to the end of the session.  Sussy demonstrated good  understanding of the education provided. See EMR under Patient Instructions for exercises provided during therapy sessions.    Assessment     Sussy is a 88 y.o. female referred to outpatient Physical Therapy with a medical diagnosis of W19.XXXA (ICD-10-CM) - Unspecified fall, initial encounter . Patient presents with history of multiple falls in the past year, + fall risk testing on TUG, L knee pain, and LE NM activation / strength deficits particularly of her L quadricept. Impairments contributory to falls and decreased jake to weightbearing ADLs.    Patient prognosis is Fair.   Patient will benefit from skilled outpatient Physical Therapy to address the deficits stated above and in the chart below, provide patient /family education, and to maximize patientt's level of independence.     Plan of care discussed with patient: Yes  Patient's spiritual, cultural and educational needs considered and patient is agreeable to the plan of care and goals as stated below:     Anticipated Barriers for therapy: age / DM    Medical Necessity is demonstrated by the following  History  Co-morbidities and personal factors that may impact the plan of care [] LOW: no personal factors / co-morbidities  [x] MODERATE: 1-2 personal factors / co-morbidities  [] HIGH: 3+ personal  factors / co-morbidities    Moderate / High Support Documentation:   Co-morbidities affecting plan of care: age / DM    Personal Factors:   age / see above     Examination  Body Structures and Functions, activity limitations and participation restrictions that may impact the plan of care [] LOW: addressing 1-2 elements  [] MODERATE: 3+ elements  [x] HIGH: 4+ elements (please support below)    Moderate / High Support Documentation: history of multiple falls in the past year, + fall risk testing on TUG, L knee pain, and LE NM activation / strength deficits particularly of her L quadricept.      Clinical Presentation [x] LOW: stable  [] MODERATE: Evolving  [] HIGH: Unstable     Decision Making/ Complexity Score: low       GOALS: Short Term Goals:  0-4 weeks  1.Report decreased L knee pain  < / =  4/10 at worst to increase tolerance for ADLs  2. Increase knee ROM to L knee in order to be able to perform ADLs without difficulty.  3. Increase strength by 1/3 MMT grade in quad  to increase tolerance for ADL and work activities.  4. Pt to tolerate HEP to improve ROM and independence with ADL's    Long Term Goals: 5-8 weeks  1.Report decreased L knee pain < / = 2/10 at worst  to increase tolerance for ADLs  2.Patient goal:  ADLs w/o complaint and increase balance  3.Increase strength to >/= 4+/5 in quad  to increase tolerance for ADL and work activities.  4. Pt will report at CJ level (20-40% impaired) on FOTO knee to demonstrate increase in LE function with every day tasks.     Plan     Plan of care Certification: 2/19/2024 to 4/19/2024.    Outpatient Physical Therapy 2 times weekly for 8 weeks to include the following interventions: Manual Therapy, Moist Heat/ Ice, Neuromuscular Re-ed, Patient Education, Self Care, Therapeutic Activities, and Therapeutic Exercise.     AURA BURT, PT, DPT, OCS

## 2024-02-21 ENCOUNTER — TELEPHONE (OUTPATIENT)
Dept: OTOLARYNGOLOGY | Facility: CLINIC | Age: 89
End: 2024-02-21
Payer: MEDICARE

## 2024-02-21 NOTE — TELEPHONE ENCOUNTER
----- Message from Luis Fernando Petty sent at 2/21/2024 10:07 AM CST -----  Type:  Needs Medical Advice    Who Called: pt    Would the patient rather a call back or a response via [facility]? Call back  Best Call Back Number: [phone]  Additional Information:     Pt state she would like a call back from Ryan about rescheduling an appt

## 2024-02-26 ENCOUNTER — OFFICE VISIT (OUTPATIENT)
Dept: OTOLARYNGOLOGY | Facility: CLINIC | Age: 89
End: 2024-02-26
Payer: MEDICARE

## 2024-02-26 ENCOUNTER — CLINICAL SUPPORT (OUTPATIENT)
Dept: REHABILITATION | Facility: HOSPITAL | Age: 89
End: 2024-02-26
Payer: MEDICARE

## 2024-02-26 VITALS
SYSTOLIC BLOOD PRESSURE: 129 MMHG | BODY MASS INDEX: 17.99 KG/M2 | WEIGHT: 108 LBS | HEIGHT: 65 IN | HEART RATE: 67 BPM | DIASTOLIC BLOOD PRESSURE: 61 MMHG

## 2024-02-26 DIAGNOSIS — R26.89 BALANCE PROBLEM: ICD-10-CM

## 2024-02-26 DIAGNOSIS — J30.89 NON-SEASONAL ALLERGIC RHINITIS, UNSPECIFIED TRIGGER: Chronic | ICD-10-CM

## 2024-02-26 DIAGNOSIS — G89.29 CHRONIC PAIN OF LEFT KNEE: Primary | ICD-10-CM

## 2024-02-26 DIAGNOSIS — J33.8 POLYP OF PARANASAL SINUS: Chronic | ICD-10-CM

## 2024-02-26 DIAGNOSIS — J32.8 OTHER CHRONIC SINUSITIS: Primary | Chronic | ICD-10-CM

## 2024-02-26 DIAGNOSIS — M25.562 CHRONIC PAIN OF LEFT KNEE: Primary | ICD-10-CM

## 2024-02-26 PROCEDURE — 99215 OFFICE O/P EST HI 40 MIN: CPT | Mod: PBBFAC,PN,25 | Performed by: OTOLARYNGOLOGY

## 2024-02-26 PROCEDURE — 31231 NASAL ENDOSCOPY DX: CPT | Mod: S$PBB,,, | Performed by: OTOLARYNGOLOGY

## 2024-02-26 PROCEDURE — 87077 CULTURE AEROBIC IDENTIFY: CPT | Performed by: OTOLARYNGOLOGY

## 2024-02-26 PROCEDURE — 87186 SC STD MICRODIL/AGAR DIL: CPT | Performed by: OTOLARYNGOLOGY

## 2024-02-26 PROCEDURE — 97112 NEUROMUSCULAR REEDUCATION: CPT

## 2024-02-26 PROCEDURE — 99999 PR PBB SHADOW E&M-EST. PATIENT-LVL V: CPT | Mod: PBBFAC,,, | Performed by: OTOLARYNGOLOGY

## 2024-02-26 PROCEDURE — 99214 OFFICE O/P EST MOD 30 MIN: CPT | Mod: 25,S$PBB,, | Performed by: OTOLARYNGOLOGY

## 2024-02-26 PROCEDURE — 31231 NASAL ENDOSCOPY DX: CPT | Mod: PBBFAC,PN | Performed by: OTOLARYNGOLOGY

## 2024-02-26 PROCEDURE — 87070 CULTURE OTHR SPECIMN AEROBIC: CPT | Performed by: OTOLARYNGOLOGY

## 2024-02-26 RX ORDER — MOXIFLOXACIN HYDROCHLORIDE 400 MG/1
400 TABLET ORAL DAILY
Qty: 5 TABLET | Refills: 0 | Status: SHIPPED | OUTPATIENT
Start: 2024-02-26 | End: 2024-03-02

## 2024-02-26 NOTE — PROGRESS NOTES
[facility]   Physical Therapy Treatment Note      Name: Sussy Costa  Clinic Number: [id number]    Therapy Diagnosis:   Encounter Diagnoses   Name Primary?    Chronic pain of left knee Yes    Balance problem      Physician: Nakul English MD    Visit Date: 2024    Physician Orders: PT Eval and Treat   Medical Diagnosis from Referral: W19.XXXA (ICD-10-CM) - Unspecified fall, initial encounter   Evaluation Date: 2024  Authorization Period Expiration: 2025   Plan of Care Expiration: 2024  Progress Note Due: 2024  Visit # / Visits authorized: ;    FOTO: 1/3     Precautions: Standard, Diabetes, Fall, and cancer      Time In: 1405  Time Out: 1500  Total Appointment Time (timed & untimed codes): 55 minutes all one on one    Subjective     Pt reports: No changes; compliant with HEP. Was getting on the floor for quad sets, but this was too difficult.    She was compliant with home exercise program.  Response to previous treatment: HEP  Functional change: ongoing    Pain: 0/10  Location: L knee / fall    Objective      Objective Measures updated at progress report unless specified.     Lower Extremity Strength:  Right LE   Left LE     Quadriceps: 4/5 Quadriceps: 3-/5   Hamstrings: 4/5 Hamstrings: 4/5   Hip Flexion: 3+/5 Hip Flexion: 3/5   Hip ABD: 4-/5 (supine) Hip ABD: 4-/5 (supine)   Hip EXT: 4/5 (supine) Hip EXT: 3/5 (supine)         Functional tests:   Sit to stand: leans excessively onto R LE for pushing up  30 sec sit to stand: 5 reps - use of UE  TU.69 sec    Treatment     Sussy received the treatments listed below:      neuromuscular re-education activities to improve: Balance, Coordination, Kinesthetic, Sense, Proprioception, and Posture for 55 minutes. The following activities were included:     Patient education:  - impairments as they relate to balance and function  - importance of quad NM activation / strength  - quad sets in bed  - HEP 3x  a day     Quad set towel 3x10x5 sec ea stevie - cuing for form necessary  LAQ w/ towel behind knee 2x10x5 sec hold ea stevie  LAQ w/ 3# (L) 2x10  SLR (upright) 2x10 ea stevie - cuing back of knee  SAQ 2x10x5 sec hold ea stevie  YTB bridge 3x10       NEXT VISIT - Bike or Nustep, cont quad NM activation (SAQ, SLR), bridge  Future Visits - balance activities        therapeutic activities to improve functional performance for 00  minutes, including:     NT    Patient Education and Home Exercises       Education provided:   - see above    Written Home Exercises Provided: Patient instructed to cont prior HEP. Exercises were reviewed and Sussy was able to demonstrate them prior to the end of the session.  Sussy demonstrated good  understanding of the education provided. See EMR under Patient Instructions for exercises provided during therapy sessions    Assessment     Patient presents with history of multiple falls in the past year, + fall risk testing on TUG, L knee pain, and LE NM activation / strength deficits particularly of her L quadricept. Impairments contributory to falls and decreased jake to weightbearing ADLs.     Good jake to all interventions focused on quad NM activation, though significant NM activation / strength deficits present in L quad, which will cont to require focus on over time.     Cont focus on quad NM activation prior to initiation of any weightbearing balance activities.    Sussy Is progressing well towards her goals.   Pt prognosis is Fair.     Pt will continue to benefit from skilled outpatient physical therapy to address the deficits listed in the problem list box on initial evaluation, provide pt/family education and to maximize pt's level of independence in the home and community environment.     Pt's spiritual, cultural and educational needs considered and pt agreeable to plan of care and goals.     Anticipated barriers to physical therapy: age / DM     GOALS: Short Term Goals:  0-4  weeks  1.Report decreased L knee pain  < / =  4/10 at worst to increase tolerance for ADLs  2. Increase knee ROM to L knee in order to be able to perform ADLs without difficulty.  3. Increase strength by 1/3 MMT grade in quad  to increase tolerance for ADL and work activities.  4. Pt to tolerate HEP to improve ROM and independence with ADL's     Long Term Goals: 5-8 weeks  1.Report decreased L knee pain < / = 2/10 at worst  to increase tolerance for ADLs  2.Patient goal:  ADLs w/o complaint and increase balance  3.Increase strength to >/= 4+/5 in quad  to increase tolerance for ADL and work activities.  4. Pt will report at CJ level (20-40% impaired) on FOTO knee to demonstrate increase in LE function with every day tasks.     Plan     Plan of care Certification: 2/19/2024 to 4/19/2024.     Outpatient Physical Therapy 2 times weekly for 8 weeks to include the following interventions: Manual Therapy, Moist Heat/ Ice, Neuromuscular Re-ed, Patient Education, Self Care, Therapeutic Activities, and Therapeutic Exercise.     AURA BURT, PT, DPT, OCS

## 2024-02-26 NOTE — PROGRESS NOTES
"CC: follow up CRS    Subjective:      Sussy Costa is a 88 y.o. female who comes for follow-up for chronic rhinosinusitis with nasal polyposis(AERD) status-post revision endoscopic sinus surgery remotely. She has noticed that she has had increased crusting. She has mild  facial pain/pressure. Still with decreased sense of smell. She does have some crusting with nasal rinses. She  is using xyzal, montelukast, and budesonide/mupirocin nasal saline rinses.     Objective:     /61   Pulse 67   Ht 5' 5" (1.651 m)   Wt 49 kg (108 lb 0.4 oz)   BMI 17.98 kg/m²      Constitutional: Well appearing / communicating.  NAD.  Eyes: EOM I Bilaterally  Head/Face: Normocephalic.  Negative paranasal sinus pressure/tenderness.  Salivary glands WNL.  House Brackmann I Bilaterally.    Right Ear: External Auditory Canal WNL,TM w/o masses/lesions/perforations.  Auricle WNL.  Left Ear: External Auditory Canal WNL,TM w/o masses/lesions/perforations. Auricle WNL.  Nose: No gross nasal septal deviation. Inferior Turbinates 3+ bilaterally. No septal perforation. No masses/lesions. External nasal skin without masses/lesions.  Oral Cavity: Gingiva/lips WNL.  FOM Soft, no masses palpated. Oral Tongue mobile. Hard Palate WNL.   Oropharynx: BOT WNL. No masses/lesions noted. Tonsillar fossa/pharyngeal wall without lesions. Posterior oropharynx WNL.  Soft palate without masses. Midline uvula.   Neck/Lymphatic: No LAD I-VI bilaterally.  No thyromegaly.  No masses noted on exam.      Procedure    Nasal endoscopy performed.  See procedure note.        Data Reviewed    Pathology report indicated chronic inflammation with eosinophilia.       Assessment:     1. Other chronic sinusitis    2. Non-seasonal allergic rhinitis, unspecified trigger    3. Polyp of paranasal sinus               Plan:     Culture taken from right sinonasal cavity.   Start avelox 400mg PO daily for 5 days.   Continue budesonide and mupirocin nasal nebulized solution and " nasal saline rinses(refills provided).   Continue Singulair and Xyzal.       No follow-ups on file.     Rosangela Isabel MD

## 2024-02-26 NOTE — PROCEDURES
Procedures     PROCEDURE NOTE:  Nasal endoscopy   Preprocedure diagnosis:  Chronic sinusitis with polyposis  Postprocedure diangosis:  Same  Complications:  None  Blood Loss:  None    Procedure in detail:  After verbal consent was obtained, the patient's nasal cavity was anesthesized using topical 1%lidocaine and Neosynepherine.  A rigid 0 degree endoscope was placed in first the right, then the left nasal cavity.  The inferior and middle turbinates were examined, and found to be boggy with edema bilaterally.   The middle meatus and maxillary antrum was also examined, and found to be patent bilaterally. Sphenoidotomy patent bilaterally. No evidence of brittany polyps. Purulent drainage/crusting present in right sinonasal cavity. No masses noted.     The patient tolerated the procedure well and there were no complications.

## 2024-02-27 NOTE — TELEPHONE ENCOUNTER
----- Message from Ladonna Manuel sent at 2/27/2024  3:09 PM CST -----  Contact: [phone] Patient  Requesting an RX refill or new RX.  Is this a refill or new RX: new  RX name and strength (copy/paste from chart):  atorvastatin (LIPITOR) 20 MG tablet  Is this a 30 day or 90 day RX: 90  Pharmacy name and phone # (copy/paste from chart):    [facility] - [place], [place] - [address]  [address] [address]  Phone: [phone] Fax: [phone]       The doctors have asked that we provide their patients with the following 2 reminders -- prescription refills can take up to 72 hours, and a friendly reminder that in the future you can use your [facility] account to request refills: yes

## 2024-02-28 RX ORDER — ATORVASTATIN CALCIUM 20 MG/1
20 TABLET, FILM COATED ORAL NIGHTLY
Qty: 90 TABLET | Refills: 1 | Status: SHIPPED | OUTPATIENT
Start: 2024-02-28

## 2024-02-29 ENCOUNTER — CLINICAL SUPPORT (OUTPATIENT)
Dept: REHABILITATION | Facility: HOSPITAL | Age: 89
End: 2024-02-29
Payer: MEDICARE

## 2024-02-29 DIAGNOSIS — M25.562 CHRONIC PAIN OF LEFT KNEE: Primary | ICD-10-CM

## 2024-02-29 DIAGNOSIS — R26.89 BALANCE PROBLEM: ICD-10-CM

## 2024-02-29 DIAGNOSIS — G89.29 CHRONIC PAIN OF LEFT KNEE: Primary | ICD-10-CM

## 2024-02-29 LAB — BACTERIA SPEC AEROBE CULT: ABNORMAL

## 2024-02-29 PROCEDURE — 97112 NEUROMUSCULAR REEDUCATION: CPT | Mod: CQ

## 2024-02-29 PROCEDURE — 97530 THERAPEUTIC ACTIVITIES: CPT | Mod: CQ

## 2024-02-29 NOTE — PROGRESS NOTES
[facility]   Physical Therapy Treatment Note      Name: Sussy Costa  Clinic Number: [id number]    Therapy Diagnosis:   Encounter Diagnoses   Name Primary?    Chronic pain of left knee Yes    Balance problem      Physician: Nakul English MD    Visit Date: 2024    Physician Orders: PT Eval and Treat   Medical Diagnosis from Referral: W19.XXXA (ICD-10-CM) - Unspecified fall, initial encounter   Evaluation Date: 2024  Authorization Period Expiration: 2025   Plan of Care Expiration: 2024  Progress Note Due: 2024  Visit # / Visits authorized:  + eval   FOTO: 1/3     Precautions: Standard, Diabetes, Fall, and cancer      Time In: 1330  Time Out: 1425  Total Appointment Time (timed & untimed codes): 55 minutes all one on one    Subjective     Patient reports: she was tired after last visit.  She was compliant with home exercise program.  Response to previous treatment: appropriate muscle response  Functional change: ongoing    Pain: 0/10, currently  Location: L knee / fall    Objective      Objective Measures updated at progress report unless specified.     Lower Extremity Strength:  Right LE   Left LE     Quadriceps: 4/5 Quadriceps: 3-/5   Hamstrings: 4/5 Hamstrings: 4/5   Hip Flexion: 3+/5 Hip Flexion: 3/5   Hip ABD: 4-/5 (supine) Hip ABD: 4-/5 (supine)   Hip EXT: 4/5 (supine) Hip EXT: 3/5 (supine)      Functional tests:   Sit to stand: leans excessively onto R LE for pushing up  30 sec sit to stand: 5 reps - use of UE  TU.69 sec    Treatment     Sussy received the treatments listed below:      neuromuscular re-education activities to improve: Balance, Coordination, Kinesthetic, Sense, Proprioception, and Posture for 47 minutes. The following activities were included:  Patient education:  - impairments as they relate to balance and function  - importance of quad NM activation / strength  - quad sets in bed  - HEP 3x a day     Quad set towel 3 x 10  reps with 5 second hold ea stevie - cuing for form necessary  LAQ at EOM with towel behind knee; 2 x 10 reps with 5 second hold ea stevie  LAQ at EOM with 3 lb AW; (L) 2 x 10 reps  SLR (upright); 2 x 10 reps ea stevie - cuing back of knee  SAQ with medium bolster; 3 x 10 reps with 5 second hold ea stevie  HL Bridge with Yellow PB band; 3 x 10 reps     NEXT VISIT - Bike or Nustep, cont quad NM activation (SAQ, SLR), bridge  Future Visits - balance activities     therapeutic activities to improve functional performance for 8 minutes, including:  Nustep for 7 minutes, Level 1 for endogenous release of opioids    Patient Education and Home Exercises       Education provided:   - See above    Written Home Exercises Provided: Patient instructed to cont prior HEP. Exercises were reviewed and Sussy was able to demonstrate them prior to the end of the session.  Sussy demonstrated good  understanding of the education provided. See EMR under Patient Instructions for exercises provided during therapy sessions    Assessment     Overall good tolerance to therapeutic interventions noting adequate muscle response throughout. She notes significant difficulty with quad specific exercises requiring cueing to maintain proper technique and muscle isolation. Will continue per POC towards treatment goals.   Ssusy Is progressing well towards her goals.   Pt prognosis is Fair.     Pt will continue to benefit from skilled outpatient physical therapy to address the deficits listed in the problem list box on initial evaluation, provide pt/family education and to maximize pt's level of independence in the home and community environment.     Pt's spiritual, cultural and educational needs considered and pt agreeable to plan of care and goals.     Anticipated barriers to physical therapy: age / DM     Goals:   Short Term Goals: 0-4 weeks - Appropriate, ongoing  1.Report decreased L knee pain  < / =  4/10 at worst to increase tolerance for ADLs  2.  Increase knee ROM to L knee in order to be able to perform ADLs without difficulty.  3. Increase strength by 1/3 MMT grade in quad  to increase tolerance for ADL and work activities.  4. Pt to tolerate HEP to improve ROM and independence with ADL's     Long Term Goals: 5-8 weeks - Appropriate, ongoing  1.Report decreased L knee pain < / = 2/10 at worst  to increase tolerance for ADLs  2.Patient goal:  ADLs w/o complaint and increase balance  3.Increase strength to >/= 4+/5 in quad  to increase tolerance for ADL and work activities.  4. Pt will report at CJ level (20-40% impaired) on FOTO knee to demonstrate increase in LE function with every day tasks.     Plan     Plan of Care Certification: 2/19/2024 to 4/19/2024.     Outpatient Physical Therapy 2 times weekly for 8 weeks to include the following interventions: Manual Therapy, Moist Heat/ Ice, Neuromuscular Re-ed, Patient Education, Self Care, Therapeutic Activities, and Therapeutic Exercise.     Ysabel Leonard, PTA

## 2024-03-05 ENCOUNTER — OFFICE VISIT (OUTPATIENT)
Dept: PODIATRY | Facility: CLINIC | Age: 89
End: 2024-03-05
Payer: MEDICARE

## 2024-03-05 VITALS
DIASTOLIC BLOOD PRESSURE: 70 MMHG | SYSTOLIC BLOOD PRESSURE: 129 MMHG | BODY MASS INDEX: 18.15 KG/M2 | HEIGHT: 65 IN | WEIGHT: 108.94 LBS | HEART RATE: 86 BPM

## 2024-03-05 DIAGNOSIS — B35.1 ONYCHOMYCOSIS DUE TO DERMATOPHYTE: ICD-10-CM

## 2024-03-05 DIAGNOSIS — E11.49 TYPE II DIABETES MELLITUS WITH NEUROLOGICAL MANIFESTATIONS: Primary | ICD-10-CM

## 2024-03-05 DIAGNOSIS — L84 CORN OR CALLUS: ICD-10-CM

## 2024-03-05 DIAGNOSIS — S90.129A CONTUSION OF FIFTH TOE: ICD-10-CM

## 2024-03-05 PROCEDURE — 11056 PARNG/CUTG B9 HYPRKR LES 2-4: CPT | Mod: Q9,S$PBB,, | Performed by: PODIATRIST

## 2024-03-05 PROCEDURE — 11721 DEBRIDE NAIL 6 OR MORE: CPT | Mod: 59,Q9,S$PBB, | Performed by: PODIATRIST

## 2024-03-05 PROCEDURE — 99999 PR PBB SHADOW E&M-EST. PATIENT-LVL IV: CPT | Mod: PBBFAC,,, | Performed by: PODIATRIST

## 2024-03-05 PROCEDURE — 99214 OFFICE O/P EST MOD 30 MIN: CPT | Mod: 25,S$PBB,, | Performed by: PODIATRIST

## 2024-03-05 PROCEDURE — 11721 DEBRIDE NAIL 6 OR MORE: CPT | Mod: 59,Q9,PBBFAC | Performed by: PODIATRIST

## 2024-03-05 PROCEDURE — 99214 OFFICE O/P EST MOD 30 MIN: CPT | Mod: PBBFAC,25 | Performed by: PODIATRIST

## 2024-03-05 PROCEDURE — 11056 PARNG/CUTG B9 HYPRKR LES 2-4: CPT | Mod: Q9,PBBFAC | Performed by: PODIATRIST

## 2024-03-07 ENCOUNTER — CLINICAL SUPPORT (OUTPATIENT)
Dept: REHABILITATION | Facility: HOSPITAL | Age: 89
End: 2024-03-07
Payer: MEDICARE

## 2024-03-07 DIAGNOSIS — G89.29 CHRONIC PAIN OF LEFT KNEE: Primary | ICD-10-CM

## 2024-03-07 DIAGNOSIS — M25.562 CHRONIC PAIN OF LEFT KNEE: Primary | ICD-10-CM

## 2024-03-07 DIAGNOSIS — R26.89 BALANCE PROBLEM: ICD-10-CM

## 2024-03-07 PROCEDURE — 97112 NEUROMUSCULAR REEDUCATION: CPT | Mod: CQ

## 2024-03-07 PROCEDURE — 97530 THERAPEUTIC ACTIVITIES: CPT | Mod: CQ

## 2024-03-07 NOTE — PROGRESS NOTES
[facility]   Physical Therapy Treatment Note      Name: uSssy Costa  Clinic Number: [id number]    Therapy Diagnosis:   Encounter Diagnoses   Name Primary?    Chronic pain of left knee Yes    Balance problem      Physician: Nakul English MD    Visit Date: 3/7/2024    Physician Orders: PT Eval and Treat   Medical Diagnosis from Referral: W19.XXXA (ICD-10-CM) - Unspecified fall, initial encounter   Evaluation Date: 2024  Authorization Period Expiration: 2025   Plan of Care Expiration: 2024  Progress Note Due: 2024  Visit # / Visits authorized: 3 / 20 + eval   FOTO: 1/3     Precautions: Standard, Diabetes, Fall, and cancer      Time In: 1335  Time Out: 1436  Total Appointment Time (timed & untimed codes): 60 minutes all one on one    Subjective     Patient reports: feeling tired today. She had some Left knee pain but thinks it was from the weather. She was unable to come last visit due to the rain.  She was compliant with home exercise program.  Response to previous treatment: appropriate muscle response  Functional change: ongoing    Pain: 0/10, currently  Location: L knee / fall    Objective      Objective Measures updated at progress report unless specified.     Lower Extremity Strength:  Right LE   Left LE     Quadriceps: 4/5 Quadriceps: 3-/5   Hamstrings: 4/5 Hamstrings: 4/5   Hip Flexion: 3+/5 Hip Flexion: 3/5   Hip ABD: 4-/5 (supine) Hip ABD: 4-/5 (supine)   Hip EXT: 4/5 (supine) Hip EXT: 3/5 (supine)      Functional tests:   Sit to stand: leans excessively onto R LE for pushing up  30 sec sit to stand: 5 reps - use of UE  TU.69 sec    Treatment     Sussy received the treatments listed below:      neuromuscular re-education activities to improve: Balance, Coordination, Kinesthetic, Sense, Proprioception, and Posture for 50 minutes. The following activities were included:  Patient education:  - impairments as they relate to balance and function  -  importance of quad NM activation / strength  - quad sets in bed  - HEP 3x a day     Quad set towel 3 x 10 reps with 5 second hold ea stevie - cuing for form necessary  LAQ at EOM with towel behind knee; 2 x 10 reps with 5 second hold ea stevie  LAQ at EOM with 3 lb AW; (L) 2 x 10 reps  SLR (upright); 2 x 10 reps ea stevie - cuing back of knee  SAQ with medium bolster; 3 x 10 reps with 5 second hold ea stevie  HL Bridge with Yellow PB band; 3 x 10 reps     NEXT VISIT - Bike or Nustep, cont quad NM activation (SAQ, SLR), bridge  Future Visits - balance activities     therapeutic activities to improve functional performance for 10 minutes, including:  Nustep for 10 minutes, Level 1 for endogenous release of opioids    Patient Education and Home Exercises       Education provided:   - See above    Written Home Exercises Provided: Patient instructed to cont prior HEP. Exercises were reviewed and Sussy was able to demonstrate them prior to the end of the session.  Sussy demonstrated good  understanding of the education provided. See EMR under Patient Instructions for exercises provided during therapy sessions    Assessment     Overall good tolerance to therapeutic interventions noting adequate muscle response throughout. Demonstrates increased fatigue throughout session requiring periodic rest breaks. Will continue per POC towards treatment goals.  Sussy Is progressing well towards her goals.   Pt prognosis is Fair.     Pt will continue to benefit from skilled outpatient physical therapy to address the deficits listed in the problem list box on initial evaluation, provide pt/family education and to maximize pt's level of independence in the home and community environment.     Pt's spiritual, cultural and educational needs considered and pt agreeable to plan of care and goals.     Anticipated barriers to physical therapy: age / DM     Goals:   Short Term Goals: 0-4 weeks - Appropriate, ongoing  1.Report decreased L knee pain  <  / =  4/10 at worst to increase tolerance for ADLs  2. Increase knee ROM to L knee in order to be able to perform ADLs without difficulty.  3. Increase strength by 1/3 MMT grade in quad  to increase tolerance for ADL and work activities.  4. Pt to tolerate HEP to improve ROM and independence with ADL's     Long Term Goals: 5-8 weeks - Appropriate, ongoing  1.Report decreased L knee pain < / = 2/10 at worst  to increase tolerance for ADLs  2.Patient goal:  ADLs w/o complaint and increase balance  3.Increase strength to >/= 4+/5 in quad  to increase tolerance for ADL and work activities.  4. Pt will report at CJ level (20-40% impaired) on FOTO knee to demonstrate increase in LE function with every day tasks.     Plan     Plan of Care Certification: 2/19/2024 to 4/19/2024.     Outpatient Physical Therapy 2 times weekly for 8 weeks to include the following interventions: Manual Therapy, Moist Heat/ Ice, Neuromuscular Re-ed, Patient Education, Self Care, Therapeutic Activities, and Therapeutic Exercise.     Ysabel Leonard, PTA

## 2024-03-10 NOTE — PROGRESS NOTES
Subjective:      Patient ID: Sussy Costa is a 88 y.o. female.    Chief Complaint:     Patient presents for routine diabetic foot care today.  She is doing well.  She is in need of diabetic education as well.  Still having some arthritic pain bilateral dorsal foot.  Inserts helping.  No other new complaints today.    Review of Systems   Constitutional: Negative for chills, decreased appetite, fever, malaise/fatigue and night sweats.   HENT:  Negative for congestion, ear discharge, hearing loss, nosebleeds and tinnitus.    Eyes:  Negative for double vision, pain and visual disturbance.   Cardiovascular:  Negative for chest pain, claudication, cyanosis and palpitations.   Respiratory:  Negative for cough, hemoptysis, shortness of breath and wheezing.    Endocrine: Negative for cold intolerance and heat intolerance.   Hematologic/Lymphatic: Negative for adenopathy and bleeding problem.   Skin:  Positive for color change, dry skin, nail changes and unusual hair distribution. Negative for flushing and rash.   Musculoskeletal:  Positive for arthritis and stiffness. Negative for joint pain and myalgias.   Gastrointestinal:  Negative for abdominal pain, dysphagia, nausea and vomiting.   Genitourinary:  Negative for dysuria, flank pain, hematuria and pelvic pain.   Neurological:  Positive for disturbances in coordination, paresthesias and sensory change. Negative for loss of balance and numbness.   Psychiatric/Behavioral:  Negative for altered mental status, hallucinations and suicidal ideas. The patient does not have insomnia.    Allergic/Immunologic: Negative for environmental allergies and persistent infections.           Objective:      Physical Exam  Vitals reviewed.   Constitutional:       Appearance: She is well-developed.   HENT:      Head: Normocephalic and atraumatic.   Cardiovascular:      Pulses:           Dorsalis pedis pulses are 2+ on the right side and 2+ on the left side.        Posterior tibial pulses  are 2+ on the right side and 2+ on the left side.      Comments: No edema noted to b/L LEs  Pulmonary:      Effort: Pulmonary effort is normal.   Musculoskeletal:         General: Normal range of motion.      Comments: Muscle strength is 5/5 in all groups bilaterally.  Non reducible equinus noted to left lower extremity  POP to insertion of the AT, left    Positive Tinel sign/provocation sign bilateral tibial nerve.   Feet:      Right foot:      Protective Sensation: 5 sites tested.  5 sites sensed.      Skin integrity: Callus and dry skin present.      Left foot:      Protective Sensation: 5 sites tested.  5 sites sensed.      Skin integrity: Callus and dry skin present.   Skin:     General: Skin is warm and dry.      Capillary Refill: Capillary refill takes 2 to 3 seconds.      Coloration: Skin is pale.      Comments: Long, thickened, discolored  toenails 1-5 with subungual debris.  Skin dry thin and atrophic.   Neurological:      Mental Status: She is alert and oriented to person, place, and time.      Sensory: Sensory deficit present.      Motor: Atrophy present.      Deep Tendon Reflexes: Reflexes abnormal.      Reflex Scores:       Patellar reflexes are 1+ on the right side and 1+ on the left side.       Achilles reflexes are 1+ on the right side and 1+ on the left side.     Comments: Intact gross sensation noted to b/L LEs   Psychiatric:         Behavior: Behavior normal.               Assessment:       Encounter Diagnoses   Name Primary?    Type II diabetes mellitus with neurological manifestations Yes    Onychomycosis due to dermatophyte     Corn or callus     Contusion of fifth toe - Right Foot            Plan:       Sussy was seen today for diabetic foot exam.    Diagnoses and all orders for this visit:    Type II diabetes mellitus with neurological manifestations    Onychomycosis due to dermatophyte    Corn or callus    Contusion of fifth toe - Right Foot        I counseled the patient on her  conditions, their implications and medical management.    Decision making:  Chronic illnesses discussed in detail, previous records/notes and imaging independently reviewed, prescription drug management performed in addition to lengthy discussion regarding both conservative and surgical treatment options.    Discussed options for peripheral neuropathy/nerve entrapment syndrome including nerve block therapy, surgical nerve entrapment decompression procedures, and various vitamins and supplementation available shown to improve nerve function.    Routine Foot Care    Performed by:  Alen Campa. DPM  Authorized by:  Patient     Consent Done?:  Yes (Verbal)     Nail Care Type:  Debride  Location(s): All  (Left 1st Toe, Left 3rd Toe, Left 2nd Toe, Left 4th Toe, Left 5th Toe, Right 1st Toe, Right 2nd Toe, Right 3rd Toe, Right 4th Toe and Right 5th Toe)  Patient tolerance:  Patient tolerated the procedure well with no immediate complications     With patient's permission, the toenails mentioned above were aggressively reduced and debrided using a nail nipper, removing all offending nail and debris. The patient will continue to monitor the areas daily, inspect the feet, wear protective shoe gear when ambulatory, and moisturizer to maintain skin integrity.      Callus Care Type: Debride    With patient's permission, the calluses/hyperkeratotic lesions mentioned above were aggressively reduced and debrided using a number 15 blade. The patient will continue to monitor the areas daily, inspect the feet, wear protective shoe gear when ambulatory, and moisturizer to maintain skin integrity.       Shoe inspection. Diabetic Foot Education. Patient reminded of the importance of good nutrition and blood sugar control to help prevent podiatric complications of diabetes. Patient instructed on proper foot hygeine. We discussed wearing proper shoe gear, daily foot inspections and Diabetic foot education in detail.    Return to clinic  in 3-6 months or sooner if problems arise         .

## 2024-03-11 ENCOUNTER — PATIENT MESSAGE (OUTPATIENT)
Dept: PULMONOLOGY | Facility: CLINIC | Age: 89
End: 2024-03-11
Payer: MEDICARE

## 2024-03-11 DIAGNOSIS — J45.40 MODERATE PERSISTENT ASTHMA WITHOUT COMPLICATION: Primary | ICD-10-CM

## 2024-03-12 RX ORDER — DOXYCYCLINE HYCLATE 100 MG
100 TABLET ORAL 2 TIMES DAILY
Qty: 14 TABLET | Refills: 0 | Status: SHIPPED | OUTPATIENT
Start: 2024-03-12 | End: 2024-03-19

## 2024-03-12 RX ORDER — PREDNISONE 20 MG/1
20 TABLET ORAL DAILY
Qty: 5 TABLET | Refills: 0 | Status: SHIPPED | OUTPATIENT
Start: 2024-03-12 | End: 2024-03-17

## 2024-03-14 ENCOUNTER — DOCUMENTATION ONLY (OUTPATIENT)
Dept: REHABILITATION | Facility: HOSPITAL | Age: 89
End: 2024-03-14
Payer: MEDICARE

## 2024-03-14 NOTE — PROGRESS NOTES
Physical Therapy: No show/Cancellation of Visit  Date: 03/14/2024    Patient cancelled today's PT appointment. Patient's next scheduled appointment is Monday, 3/18/2024.     Initial Evaluation: 2/19/2024  Plan of Care Explanation: 4/19/2024  Cancel: 3  No show: 0    Therapist: Ysabel Leonard PTA

## 2024-03-18 ENCOUNTER — CLINICAL SUPPORT (OUTPATIENT)
Dept: REHABILITATION | Facility: HOSPITAL | Age: 89
End: 2024-03-18
Payer: MEDICARE

## 2024-03-18 DIAGNOSIS — G89.29 CHRONIC PAIN OF LEFT KNEE: ICD-10-CM

## 2024-03-18 DIAGNOSIS — R26.89 BALANCE PROBLEM: Primary | ICD-10-CM

## 2024-03-18 DIAGNOSIS — M25.562 CHRONIC PAIN OF LEFT KNEE: ICD-10-CM

## 2024-03-18 PROCEDURE — 97112 NEUROMUSCULAR REEDUCATION: CPT | Mod: CQ

## 2024-03-18 NOTE — PROGRESS NOTES
[facility]   Physical Therapy Treatment Note      Name: Sussy Costa  Clinic Number: [id number]    Therapy Diagnosis:   Encounter Diagnoses   Name Primary?    Balance problem Yes    Chronic pain of left knee      Physician: Nakul English MD    Visit Date: 3/18/2024    Physician Orders: PT Eval and Treat   Medical Diagnosis from Referral: W19.XXXA (ICD-10-CM) - Unspecified fall, initial encounter   Evaluation Date: 2024  Authorization Period Expiration: 2025   Plan of Care Expiration: 2024  Progress Note Due: 2024  Visit # / Visits authorized:  eval   FOTO: 1/3     Precautions: Standard, Diabetes, Fall, and cancer      Time In: 1400  Time Out: 1453  Total Appointment Time (timed & untimed codes): 25 minutes (2 NMR)    Subjective     Patient reports: she hasn't been here due to asthma. She feels better but still has some wheezing.  She was compliant with home exercise program.  Response to previous treatment: appropriate muscle response  Functional change: ongoing    Pain: 0/10, currently  Location: L knee / fall    Objective      Objective Measures updated at progress report unless specified.     Lower Extremity Strength:  Right LE   Left LE     Quadriceps: 4/5 Quadriceps: 3-/5   Hamstrings: 4/5 Hamstrings: 4/5   Hip Flexion: 3+/5 Hip Flexion: 3/5   Hip ABD: 4-/5 (supine) Hip ABD: 4-/5 (supine)   Hip EXT: 4/5 (supine) Hip EXT: 3/5 (supine)      Functional tests:   Sit to stand: leans excessively onto R LE for pushing up  30 sec sit to stand: 5 reps - use of UE  TU.69 sec    Treatment     Sussy received the treatments listed below:      neuromuscular re-education activities to improve: Balance, Coordination, Kinesthetic, Sense, Proprioception, and Posture for 53 minutes. The following activities were included:  Patient education:  - impairments as they relate to balance and function  - importance of quad NM activation / strength  - quad sets in  bed  - HEP 3x a day     Quad set towel 2 x 10 reps with 10 second hold ea stevie - cuing for form necessary  LAQ at EOM with towel behind knee; 2 x 10 reps with 5 second hold ea stevie  LAQ at EOM with 3 lb AW; (L) 3 x 10 reps  SLR (upright); 2 x 10 reps ea stevie - cuing back of knee  SAQ with medium bolster; 3 x 10 reps with 5 second hold ea stevie  HL Bridge with Yellow PB band; 3 x 10 reps     NEXT VISIT - Bike or Nustep, cont quad NM activation (SAQ, SLR), bridge  Future Visits - balance activities     therapeutic activities to improve functional performance for 00 minutes, including:  Nustep for 10 minutes, Level 1 for endogenous release of opioids    Patient Education and Home Exercises       Education provided:   - See above    Written Home Exercises Provided: Patient instructed to cont prior HEP. Exercises were reviewed and Sussy was able to demonstrate them prior to the end of the session.  Sussy demonstrated good  understanding of the education provided. See EMR under Patient Instructions for exercises provided during therapy sessions    Assessment     Limited progression today secondary to patient having a 2 week break from PT. She notes significant fatigue with quad specific exercises. Deferred Nustep today due to recent exacerbation of asthma and current symptoms. Will continue per POC towards treatment goals.  Sussy Is progressing well towards her goals.   Pt prognosis is Fair.     Pt will continue to benefit from skilled outpatient physical therapy to address the deficits listed in the problem list box on initial evaluation, provide pt/family education and to maximize pt's level of independence in the home and community environment.     Pt's spiritual, cultural and educational needs considered and pt agreeable to plan of care and goals.     Anticipated barriers to physical therapy: age / DM     Goals:   Short Term Goals: 0-4 weeks - Appropriate, ongoing  1.Report decreased L knee pain  < / =  4/10 at worst  to increase tolerance for ADLs  2. Increase knee ROM to L knee in order to be able to perform ADLs without difficulty.  3. Increase strength by 1/3 MMT grade in quad  to increase tolerance for ADL and work activities.  4. Pt to tolerate HEP to improve ROM and independence with ADL's     Long Term Goals: 5-8 weeks - Appropriate, ongoing  1.Report decreased L knee pain < / = 2/10 at worst  to increase tolerance for ADLs  2.Patient goal:  ADLs w/o complaint and increase balance  3.Increase strength to >/= 4+/5 in quad  to increase tolerance for ADL and work activities.  4. Pt will report at CJ level (20-40% impaired) on FOTO knee to demonstrate increase in LE function with every day tasks.     Plan     Plan of Care Certification: 2/19/2024 to 4/19/2024.     Outpatient Physical Therapy 2 times weekly for 8 weeks to include the following interventions: Manual Therapy, Moist Heat/ Ice, Neuromuscular Re-ed, Patient Education, Self Care, Therapeutic Activities, and Therapeutic Exercise.     Ysabel Leonard, PTA

## 2024-03-21 ENCOUNTER — CLINICAL SUPPORT (OUTPATIENT)
Dept: REHABILITATION | Facility: HOSPITAL | Age: 89
End: 2024-03-21
Payer: MEDICARE

## 2024-03-21 DIAGNOSIS — R26.89 BALANCE PROBLEM: ICD-10-CM

## 2024-03-21 DIAGNOSIS — M25.562 CHRONIC PAIN OF LEFT KNEE: Primary | ICD-10-CM

## 2024-03-21 DIAGNOSIS — G89.29 CHRONIC PAIN OF LEFT KNEE: Primary | ICD-10-CM

## 2024-03-21 PROCEDURE — 97112 NEUROMUSCULAR REEDUCATION: CPT

## 2024-03-21 NOTE — PROGRESS NOTES
[facility]   Physical Therapy Treatment Note      Name: Sussy Costa  Clinic Number: [id number]    Therapy Diagnosis:   Encounter Diagnoses   Name Primary?    Chronic pain of left knee Yes    Balance problem        Physician: Nakul English MD    Visit Date: 3/21/2024    Physician Orders: PT Eval and Treat   Medical Diagnosis from Referral: W19.XXXA (ICD-10-CM) - Unspecified fall, initial encounter   Evaluation Date: 2024  Authorization Period Expiration: 2025   Plan of Care Expiration: 2024  Progress Note Due: 2024  Visit # / Visits authorized:  + eval   FOTO: 1/3     Precautions: Standard, Diabetes, Fall, and cancer      Time In: 1255  Time Out: 1400  Total Appointment Time (timed & untimed codes): 65 minutes - 40 min one on one    Subjective     Patient reports: Cont asthma discomfort, but improving over time and with management. Knee was a little sore after walking yesterday. Feeling some improvements in strength    FOTO IE - 56%  FOTO 3/21/24 - 59%  FOTO Goal - 67%    She was compliant with home exercise program.  Response to previous treatment: appropriate muscle response  Functional change: ongoing    Pain: 0/10, currently  Location: L knee / fall    Objective      Objective Measures updated at progress report unless specified.     Lower Extremity Strength:  Right LE   Left LE     Quadriceps: 4/5 Quadriceps: 3-/5   Hamstrings: 4/5 Hamstrings: 4/5   Hip Flexion: 3+/5 Hip Flexion: 3/5   Hip ABD: 4-/5 (supine) Hip ABD: 4-/5 (supine)   Hip EXT: 4/5 (supine) Hip EXT: 3/5 (supine)      Functional tests:   Sit to stand: leans excessively onto R LE for pushing up  30 sec sit to stand: 5 reps - use of UE  TU.69 sec    Treatment     Sussy received the treatments listed below:      neuromuscular re-education activities to improve: Balance, Coordination, Kinesthetic, Sense, Proprioception, and Posture for 65 minutes - 40 min one on one. The following  activities were included:    Patient education:  - impairments as they relate to balance and function  - importance of quad NM activation / strength  - quad sets in bed/cuing for form  - HEP 3x a day    Quad set towel [id number] sec hold ea stevie - needs cues for form initially  SLR (upright); 3x8 ea stevie- cuing back of knee  HL Bridge with Yellow PB band; 2x10x5 sec hold  LAQ 4 lbs (L) 3x10    Tandem balance 4x30 sec ea stevie  SL balance x30 sec ea stevie     Held:  SAQ with medium bolster; 3 x 10 reps with 5 second hold ea stevie     NEXT VISIT - Bike or Nustep, cont quad NM activation (SAQ, SLR), bridge  Future Visits - balance activities     therapeutic activities to improve functional performance for 00 minutes, including:    Nustep for 10 minutes, Level 1 for endogenous release of opioids    Patient Education and Home Exercises       Education provided:   - See above    Written Home Exercises Provided: Patient instructed to cont prior HEP. Exercises were reviewed and Sussy was able to demonstrate them prior to the end of the session.  Sussy demonstrated good  understanding of the education provided. See EMR under Patient Instructions for exercises provided during therapy sessions    Assessment     Penelope all interventions well though cont to require some reminders for form to elicit good quad NM activation on quad sets.     Some improvements in function per FOTO scores, though significant quad NM activation and strength deficits persist on L LE that require cont focus secondary to history of knee buckling leading to falls.    Cont to focus on L quad and static balance as able.    Sussy Is progressing well towards her goals.   Pt prognosis is Fair.     Pt will continue to benefit from skilled outpatient physical therapy to address the deficits listed in the problem list box on initial evaluation, provide pt/family education and to maximize pt's level of independence in the home and community environment.     Pt's  spiritual, cultural and educational needs considered and pt agreeable to plan of care and goals.     Anticipated barriers to physical therapy: age / DM     Goals:   Short Term Goals: 0-4 weeks - Appropriate, ongoing  1.Report decreased L knee pain  < / =  4/10 at worst to increase tolerance for ADLs  2. Increase knee ROM to L knee in order to be able to perform ADLs without difficulty.  3. Increase strength by 1/3 MMT grade in quad  to increase tolerance for ADL and work activities.  4. Pt to tolerate HEP to improve ROM and independence with ADL's     Long Term Goals: 5-8 weeks - Appropriate, ongoing  1.Report decreased L knee pain < / = 2/10 at worst  to increase tolerance for ADLs  2.Patient goal:  ADLs w/o complaint and increase balance  3.Increase strength to >/= 4+/5 in quad  to increase tolerance for ADL and work activities.  4. Pt will report at CJ level (20-40% impaired) on FOTO knee to demonstrate increase in LE function with every day tasks.     Plan     Plan of Care Certification: 2/19/2024 to 4/19/2024.     Outpatient Physical Therapy 2 times weekly for 8 weeks to include the following interventions: Manual Therapy, Moist Heat/ Ice, Neuromuscular Re-ed, Patient Education, Self Care, Therapeutic Activities, and Therapeutic Exercise.     AURA BURT, PT, DPT, OCS

## 2024-03-22 ENCOUNTER — OFFICE VISIT (OUTPATIENT)
Dept: URGENT CARE | Facility: CLINIC | Age: 89
End: 2024-03-22
Payer: MEDICARE

## 2024-03-22 VITALS
WEIGHT: 107.81 LBS | DIASTOLIC BLOOD PRESSURE: 80 MMHG | HEART RATE: 89 BPM | OXYGEN SATURATION: 98 % | SYSTOLIC BLOOD PRESSURE: 144 MMHG | BODY MASS INDEX: 17.94 KG/M2 | TEMPERATURE: 98 F | RESPIRATION RATE: 19 BRPM

## 2024-03-22 DIAGNOSIS — J45.41 MODERATE PERSISTENT ASTHMA WITH EXACERBATION: Primary | ICD-10-CM

## 2024-03-22 PROCEDURE — 99214 OFFICE O/P EST MOD 30 MIN: CPT | Mod: 25,S$GLB,, | Performed by: NURSE PRACTITIONER

## 2024-03-22 PROCEDURE — 96372 THER/PROPH/DIAG INJ SC/IM: CPT | Mod: 59,S$GLB,, | Performed by: NURSE PRACTITIONER

## 2024-03-22 PROCEDURE — 94640 AIRWAY INHALATION TREATMENT: CPT | Mod: 59,S$GLB,, | Performed by: NURSE PRACTITIONER

## 2024-03-22 RX ORDER — PREDNISONE 10 MG/1
10 TABLET ORAL DAILY
Qty: 4 TABLET | Refills: 0 | Status: SHIPPED | OUTPATIENT
Start: 2024-03-24 | End: 2024-03-28

## 2024-03-22 RX ORDER — ALBUTEROL SULFATE 0.83 MG/ML
2.5 SOLUTION RESPIRATORY (INHALATION)
Status: COMPLETED | OUTPATIENT
Start: 2024-03-22 | End: 2024-03-22

## 2024-03-22 RX ORDER — IPRATROPIUM BROMIDE 0.5 MG/2.5ML
0.5 SOLUTION RESPIRATORY (INHALATION)
Status: COMPLETED | OUTPATIENT
Start: 2024-03-22 | End: 2024-03-22

## 2024-03-22 RX ORDER — DEXAMETHASONE SODIUM PHOSPHATE 100 MG/10ML
4 INJECTION INTRAMUSCULAR; INTRAVENOUS
Status: COMPLETED | OUTPATIENT
Start: 2024-03-22 | End: 2024-03-22

## 2024-03-22 RX ORDER — ALBUTEROL SULFATE 0.83 MG/ML
2.5 SOLUTION RESPIRATORY (INHALATION) EVERY 6 HOURS PRN
Qty: 60 EACH | Refills: 0 | Status: SHIPPED | OUTPATIENT
Start: 2024-03-22

## 2024-03-22 RX ADMIN — ALBUTEROL SULFATE 2.5 MG: 0.83 SOLUTION RESPIRATORY (INHALATION) at 05:03

## 2024-03-22 RX ADMIN — IPRATROPIUM BROMIDE 0.5 MG: 0.5 SOLUTION RESPIRATORY (INHALATION) at 06:03

## 2024-03-22 RX ADMIN — IPRATROPIUM BROMIDE 0.5 MG: 0.5 SOLUTION RESPIRATORY (INHALATION) at 05:03

## 2024-03-22 RX ADMIN — DEXAMETHASONE SODIUM PHOSPHATE 4 MG: 100 INJECTION INTRAMUSCULAR; INTRAVENOUS at 05:03

## 2024-03-22 RX ADMIN — ALBUTEROL SULFATE 2.5 MG: 0.83 SOLUTION RESPIRATORY (INHALATION) at 06:03

## 2024-03-22 NOTE — PROGRESS NOTES
Subjective:      Patient ID: Sussy Costa is a 88 y.o. female.    Vitals:  weight is 48.9 kg (107 lb 12.9 oz). Her oral temperature is 97.9 °F (36.6 °C). Her blood pressure is 144/80 (abnormal) and her pulse is 89. Her respiration is 19 and oxygen saturation is 98%.     Chief Complaint: Wheezing    88 year old female with h/o moderate persistent asthma, presents to  today with wheezing that started two days ago. She recently completed antibiotics and steroids. She is on daily trelegy ellipta and albuterol as needed. She states that as soon as she completed the steroids, the wheezing returned. She has a pulmonologist. She is inquiring about being on a daily low-dose steroid, in which she was informed she has to discuss with her pulmonologist. She also has CKD and last GFR is 28.8.  she reports SOB, can't catch breath, and significant wheeze. Sa02 is good-however. Denies fever and denies feeling ill. A chest CT has been scheduled for her by her MD.     Wheezing   The current episode started in the past 7 days. The problem occurs constantly. The problem has been unchanged. Associated symptoms include coughing and shortness of breath. Pertinent negatives include no abdominal pain, chest pain, chills, coryza, diarrhea, ear pain, fever, headaches, hemoptysis, neck pain, rash, rhinorrhea, sore throat, sputum production, swollen glands or vomiting. Nothing aggravates the symptoms. She has tried nothing for the symptoms.       Constitution: Negative for chills, fatigue and fever.   HENT:  Negative for ear pain and sore throat.    Neck: Negative for neck pain.   Cardiovascular:  Negative for chest pain, palpitations and sob on exertion.   Respiratory:  Positive for chest tightness, cough, shortness of breath, wheezing and asthma. Negative for sputum production and bloody sputum.    Gastrointestinal:  Negative for abdominal pain, vomiting and diarrhea.   Skin:  Negative for rash.   Allergic/Immunologic: Positive for  asthma.   Neurological:  Negative for headaches.      Objective:     Physical Exam   Constitutional: She is oriented to person, place, and time. She appears well-developed. She is cooperative.  Non-toxic appearance. She does not appear ill. No distress.   HENT:   Head: Normocephalic.   Ears:   Right Ear: Hearing, tympanic membrane, external ear and ear canal normal.   Left Ear: Hearing, tympanic membrane, external ear and ear canal normal.   Nose: Nose normal. No mucosal edema, rhinorrhea or nasal deformity. No epistaxis. Right sinus exhibits no maxillary sinus tenderness and no frontal sinus tenderness. Left sinus exhibits no maxillary sinus tenderness and no frontal sinus tenderness.   Mouth/Throat: Uvula is midline, oropharynx is clear and moist and mucous membranes are normal. Mucous membranes are moist. No trismus in the jaw. Normal dentition. No uvula swelling. No oropharyngeal exudate, posterior oropharyngeal edema or posterior oropharyngeal erythema. Oropharynx is clear.   Eyes: Conjunctivae and lids are normal. No scleral icterus.   Neck: Trachea normal and phonation normal. Neck supple. No edema present. No erythema present. No neck rigidity present.   Cardiovascular: Normal rate and regular rhythm.   Pulmonary/Chest: She has no decreased breath sounds. She has wheezes (insp and exp). She has no rhonchi.         Comments: Tight and decreased BS    Musculoskeletal: Normal range of motion.         General: No deformity. Normal range of motion.   Neurological: She is alert and oriented to person, place, and time. She exhibits normal muscle tone. Coordination normal.   Skin: Skin is warm, dry, intact, not diaphoretic and not pale.   Psychiatric: Her speech is normal and behavior is normal. Judgment and thought content normal.   Nursing note and vitals reviewed.      Assessment:     1. Moderate persistent asthma with exacerbation        Plan:   After 2 duo-nebs, pt has increased BS noted to all lung fields. She  is still wheezing, but airway is more open and she states she feels a lot better. She is stable to be discharged home with instructions to f/u with pulmonary.     Moderate persistent asthma with exacerbation  -     albuterol nebulizer solution 2.5 mg  -     ipratropium 0.02 % nebulizer solution 0.5 mg  -     dexAMETHasone injection 4 mg  -     albuterol nebulizer solution 2.5 mg  -     ipratropium 0.02 % nebulizer solution 0.5 mg  -     albuterol (PROVENTIL) 2.5 mg /3 mL (0.083 %) nebulizer solution; Take 3 mLs (2.5 mg total) by nebulization every 6 (six) hours as needed for Wheezing or Shortness of Breath. Rescue  Dispense: 60 each; Refill: 0  -     predniSONE (DELTASONE) 10 MG tablet; Take 1 tablet (10 mg total) by mouth once daily. for 4 days  Dispense: 4 tablet; Refill: 0      Patient Instructions   Follow up with pulmonologist  Have chest CT done as ordered and scheduled  Follow up with any worsening symptoms

## 2024-03-24 NOTE — PATIENT INSTRUCTIONS
Follow up with pulmonologist  Have chest CT done as ordered and scheduled  Follow up with any worsening symptoms

## 2024-03-26 ENCOUNTER — CLINICAL SUPPORT (OUTPATIENT)
Dept: REHABILITATION | Facility: HOSPITAL | Age: 89
End: 2024-03-26
Payer: MEDICARE

## 2024-03-26 DIAGNOSIS — G89.29 CHRONIC PAIN OF LEFT KNEE: Primary | ICD-10-CM

## 2024-03-26 DIAGNOSIS — R26.89 BALANCE PROBLEM: ICD-10-CM

## 2024-03-26 DIAGNOSIS — M25.562 CHRONIC PAIN OF LEFT KNEE: Primary | ICD-10-CM

## 2024-03-26 PROCEDURE — 97112 NEUROMUSCULAR REEDUCATION: CPT | Mod: CQ

## 2024-03-26 NOTE — PROGRESS NOTES
[facility]   Physical Therapy Treatment Note      Name: Sussy Costa  Clinic Number: [id number]    Therapy Diagnosis:   Encounter Diagnoses   Name Primary?    Chronic pain of left knee Yes    Balance problem      Physician: Nakul English MD    Visit Date: 3/26/2024    Physician Orders: PT Eval and Treat   Medical Diagnosis from Referral: W19.XXXA (ICD-10-CM) - Unspecified fall, initial encounter   Evaluation Date: 2024  Authorization Period Expiration: 2025   Plan of Care Expiration: 2024  Progress Note Due: 2024  Visit # / Visits authorized:  + eval   FOTO: 1/3     Precautions: Standard, Diabetes, Fall, and cancer      Time In: 1400  Time Out: 1500  Total Appointment Time (timed & untimed codes): 60 minutes     Subjective     Patient reports: she feels better today in regard to her asthma. States she has medial knee pain.     FOTO IE - 56%  FOTO 3/21/24 - 59%  FOTO Goal - 67%    She was compliant with home exercise program.  Response to previous treatment: appropriate muscle response  Functional change: ongoing    Pain: 0/10, currently  Location: L knee / fall    Objective      Objective Measures updated at progress report unless specified.     Lower Extremity Strength:  Right LE   Left LE     Quadriceps: 4/5 Quadriceps: 3-/5   Hamstrings: 4/5 Hamstrings: 4/5   Hip Flexion: 3+/5 Hip Flexion: 3/5   Hip ABD: 4-/5 (supine) Hip ABD: 4-/5 (supine)   Hip EXT: 4/5 (supine) Hip EXT: 3/5 (supine)      Functional tests:   Sit to stand: leans excessively onto R LE for pushing up  30 sec sit to stand: 5 reps - use of UE  TU.69 sec    Treatment     Sussy received the treatments listed below:      neuromuscular re-education activities to improve: Balance, Coordination, Kinesthetic, Sense, Proprioception, and Posture for 52 minutes. The following activities were included:  Patient education:  - impairments as they relate to balance and function  - importance of  quad NM activation / strength  - quad sets in bed/cuing for form  - HEP 3x a day    Quad set towel [id number] sec hold ea stevie - needs cues for form initially  SLR (upright); 3x8 ea stevie- cuing back of knee  HL Bridge with Yellow PB band; 2x10x5 sec hold  LAQ 4 lbs (L) 3x10    Tandem balance 4x30 sec ea stevie  SL balance x30 sec ea stevie     Held:  SAQ with medium bolster; 3 x 10 reps with 5 second hold ea stevie     NEXT VISIT - Bike or Nustep, cont quad NM activation (SAQ, SLR), bridge  Future Visits - balance activities     therapeutic activities to improve functional performance for 8 minutes, including:  Nustep for 8 minutes, Level 1 for endogenous release of opioids    Patient Education and Home Exercises       Education provided:   - See above    Written Home Exercises Provided: Patient instructed to cont prior HEP. Exercises were reviewed and Sussy was able to demonstrate them prior to the end of the session.  Sussy demonstrated good  understanding of the education provided. See EMR under Patient Instructions for exercises provided during therapy sessions    Assessment     Overall good tolerance to therapeutic interventions noting adequate muscle response throughout. Resumed Nustep today noting increased fatigue. Will continue per POC towards treatment goals.  Sussy Is progressing well towards her goals.   Pt prognosis is Fair.     Pt will continue to benefit from skilled outpatient physical therapy to address the deficits listed in the problem list box on initial evaluation, provide pt/family education and to maximize pt's level of independence in the home and community environment.     Pt's spiritual, cultural and educational needs considered and pt agreeable to plan of care and goals.     Anticipated barriers to physical therapy: age / DM     Goals:   Short Term Goals: 0-4 weeks - Appropriate, ongoing  1.Report decreased L knee pain  < / =  4/10 at worst to increase tolerance for ADLs  2. Increase knee ROM to L  knee in order to be able to perform ADLs without difficulty.  3. Increase strength by 1/3 MMT grade in quad  to increase tolerance for ADL and work activities.  4. Pt to tolerate HEP to improve ROM and independence with ADL's     Long Term Goals: 5-8 weeks - Appropriate, ongoing  1.Report decreased L knee pain < / = 2/10 at worst  to increase tolerance for ADLs  2.Patient goal:  ADLs w/o complaint and increase balance  3.Increase strength to >/= 4+/5 in quad  to increase tolerance for ADL and work activities.  4. Pt will report at CJ level (20-40% impaired) on FOTO knee to demonstrate increase in LE function with every day tasks.     Plan     Plan of Care Certification: 2/19/2024 to 4/19/2024.     Outpatient Physical Therapy 2 times weekly for 8 weeks to include the following interventions: Manual Therapy, Moist Heat/ Ice, Neuromuscular Re-ed, Patient Education, Self Care, Therapeutic Activities, and Therapeutic Exercise.     Ysabel Leonard, PTA

## 2024-03-30 ENCOUNTER — HOSPITAL ENCOUNTER (INPATIENT)
Facility: HOSPITAL | Age: 89
LOS: 4 days | Discharge: NURSING, REHAB OR LONG-TERM CARE | DRG: 480 | End: 2024-04-03
Attending: EMERGENCY MEDICINE | Admitting: HOSPITALIST
Payer: MEDICARE

## 2024-03-30 ENCOUNTER — ANESTHESIA EVENT (OUTPATIENT)
Dept: SURGERY | Facility: HOSPITAL | Age: 89
DRG: 480 | End: 2024-03-30
Payer: MEDICARE

## 2024-03-30 DIAGNOSIS — I10 PRIMARY HYPERTENSION: ICD-10-CM

## 2024-03-30 DIAGNOSIS — N18.9 ACUTE KIDNEY INJURY SUPERIMPOSED ON CHRONIC KIDNEY DISEASE: ICD-10-CM

## 2024-03-30 DIAGNOSIS — N17.9 ACUTE KIDNEY INJURY SUPERIMPOSED ON CHRONIC KIDNEY DISEASE: ICD-10-CM

## 2024-03-30 DIAGNOSIS — S72.142A CLOSED 2-PART INTERTROCHANTERIC FRACTURE OF LEFT FEMUR, INITIAL ENCOUNTER: ICD-10-CM

## 2024-03-30 DIAGNOSIS — Z01.818 PREOPERATIVE CLEARANCE: ICD-10-CM

## 2024-03-30 DIAGNOSIS — S72.142A CLOSED INTERTROCHANTERIC FRACTURE OF HIP, LEFT, INITIAL ENCOUNTER: Primary | ICD-10-CM

## 2024-03-30 DIAGNOSIS — J45.40 MODERATE PERSISTENT ASTHMA WITHOUT COMPLICATION: ICD-10-CM

## 2024-03-30 DIAGNOSIS — E11.65 TYPE 2 DIABETES MELLITUS WITH HYPERGLYCEMIA, WITH LONG-TERM CURRENT USE OF INSULIN: ICD-10-CM

## 2024-03-30 DIAGNOSIS — Z79.4 TYPE 2 DIABETES MELLITUS WITH HYPERGLYCEMIA, WITH LONG-TERM CURRENT USE OF INSULIN: ICD-10-CM

## 2024-03-30 PROBLEM — R53.81 DEBILITY: Status: ACTIVE | Noted: 2022-11-02

## 2024-03-30 LAB
25(OH)D3+25(OH)D2 SERPL-MCNC: 27 NG/ML (ref 30–96)
ALBUMIN SERPL BCP-MCNC: 3.3 G/DL (ref 3.5–5.2)
ALLENS TEST: ABNORMAL
ALP SERPL-CCNC: 50 U/L (ref 55–135)
ALT SERPL W/O P-5'-P-CCNC: 34 U/L (ref 10–44)
ANION GAP SERPL CALC-SCNC: 9 MMOL/L (ref 8–16)
APTT PPP: 56.6 SEC (ref 21–32)
AST SERPL-CCNC: 24 U/L (ref 10–40)
B-OH-BUTYR BLD STRIP-SCNC: 0.1 MMOL/L (ref 0–0.5)
BACTERIA #/AREA URNS AUTO: NORMAL /HPF
BASOPHILS # BLD AUTO: 0.02 K/UL (ref 0–0.2)
BASOPHILS NFR BLD: 0.2 % (ref 0–1.9)
BILIRUB SERPL-MCNC: 0.3 MG/DL (ref 0.1–1)
BILIRUB UR QL STRIP: NEGATIVE
BNP SERPL-MCNC: 291 PG/ML (ref 0–99)
BUN SERPL-MCNC: 36 MG/DL (ref 8–23)
CALCIUM SERPL-MCNC: 9.4 MG/DL (ref 8.7–10.5)
CHLORIDE SERPL-SCNC: 112 MMOL/L (ref 95–110)
CLARITY UR REFRACT.AUTO: CLEAR
CO2 SERPL-SCNC: 18 MMOL/L (ref 23–29)
COLOR UR AUTO: COLORLESS
CREAT SERPL-MCNC: 2.1 MG/DL (ref 0.5–1.4)
DIFFERENTIAL METHOD BLD: ABNORMAL
EOSINOPHIL # BLD AUTO: 0 K/UL (ref 0–0.5)
EOSINOPHIL NFR BLD: 0.2 % (ref 0–8)
ERYTHROCYTE [DISTWIDTH] IN BLOOD BY AUTOMATED COUNT: 13.9 % (ref 11.5–14.5)
EST. GFR  (NO RACE VARIABLE): 22.4 ML/MIN/1.73 M^2
ESTIMATED AVG GLUCOSE: 166 MG/DL (ref 68–131)
FERRITIN SERPL-MCNC: 120 NG/ML (ref 20–300)
GLUCOSE SERPL-MCNC: 578 MG/DL (ref 70–110)
GLUCOSE UR QL STRIP: ABNORMAL
HBA1C MFR BLD: 7.4 % (ref 4–5.6)
HCO3 UR-SCNC: 15.6 MMOL/L (ref 24–28)
HCT VFR BLD AUTO: 30.4 % (ref 37–48.5)
HCV AB SERPL QL IA: NORMAL
HGB BLD-MCNC: 9.9 G/DL (ref 12–16)
HGB UR QL STRIP: NEGATIVE
HIV 1+2 AB+HIV1 P24 AG SERPL QL IA: NORMAL
IMM GRANULOCYTES # BLD AUTO: 0.05 K/UL (ref 0–0.04)
IMM GRANULOCYTES NFR BLD AUTO: 0.6 % (ref 0–0.5)
INR PPP: 7.2 (ref 0.8–1.2)
KETONES UR QL STRIP: NEGATIVE
LEUKOCYTE ESTERASE UR QL STRIP: NEGATIVE
LYMPHOCYTES # BLD AUTO: 1.2 K/UL (ref 1–4.8)
LYMPHOCYTES NFR BLD: 13.6 % (ref 18–48)
MAGNESIUM SERPL-MCNC: 1.3 MG/DL (ref 1.6–2.6)
MCH RBC QN AUTO: 31.6 PG (ref 27–31)
MCHC RBC AUTO-ENTMCNC: 32.6 G/DL (ref 32–36)
MCV RBC AUTO: 97 FL (ref 82–98)
MICROSCOPIC COMMENT: NORMAL
MONOCYTES # BLD AUTO: 0.5 K/UL (ref 0.3–1)
MONOCYTES NFR BLD: 6.1 % (ref 4–15)
NEUTROPHILS # BLD AUTO: 6.7 K/UL (ref 1.8–7.7)
NEUTROPHILS NFR BLD: 79.3 % (ref 38–73)
NITRITE UR QL STRIP: NEGATIVE
NRBC BLD-RTO: 0 /100 WBC
OSMOLALITY SERPL: 336 MOSM/KG (ref 275–295)
PCO2 BLDA: 26.7 MMHG (ref 35–45)
PH SMN: 7.38 [PH] (ref 7.35–7.45)
PH UR STRIP: 5 [PH] (ref 5–8)
PHOSPHATE SERPL-MCNC: 3.1 MG/DL (ref 2.7–4.5)
PLATELET # BLD AUTO: 185 K/UL (ref 150–450)
PMV BLD AUTO: 12.1 FL (ref 9.2–12.9)
PO2 BLDA: 88 MMHG (ref 40–60)
POC BE: -10 MMOL/L
POC SATURATED O2: 97 % (ref 95–100)
POC TCO2: 16 MMOL/L (ref 24–29)
POCT GLUCOSE: 294 MG/DL (ref 70–110)
POTASSIUM SERPL-SCNC: 4.8 MMOL/L (ref 3.5–5.1)
PREALB SERPL-MCNC: 8 MG/DL (ref 20–43)
PROT SERPL-MCNC: 6 G/DL (ref 6–8.4)
PROT UR QL STRIP: NEGATIVE
PROTHROMBIN TIME: 68.1 SEC (ref 9–12.5)
RBC # BLD AUTO: 3.13 M/UL (ref 4–5.4)
RBC #/AREA URNS AUTO: 1 /HPF (ref 0–4)
SAMPLE: ABNORMAL
SITE: ABNORMAL
SODIUM SERPL-SCNC: 139 MMOL/L (ref 136–145)
SP GR UR STRIP: 1.01 (ref 1–1.03)
TRANSFERRIN SERPL-MCNC: 213 MG/DL (ref 200–375)
TROPONIN I SERPL DL<=0.01 NG/ML-MCNC: 0.08 NG/ML (ref 0–0.03)
URN SPEC COLLECT METH UR: ABNORMAL
WBC # BLD AUTO: 8.5 K/UL (ref 3.9–12.7)
WBC #/AREA URNS AUTO: 0 /HPF (ref 0–5)
YEAST UR QL AUTO: NORMAL

## 2024-03-30 PROCEDURE — 83930 ASSAY OF BLOOD OSMOLALITY: CPT

## 2024-03-30 PROCEDURE — 94761 N-INVAS EAR/PLS OXIMETRY MLT: CPT

## 2024-03-30 PROCEDURE — 82728 ASSAY OF FERRITIN: CPT

## 2024-03-30 PROCEDURE — 85025 COMPLETE CBC W/AUTO DIFF WBC: CPT | Performed by: EMERGENCY MEDICINE

## 2024-03-30 PROCEDURE — 93005 ELECTROCARDIOGRAM TRACING: CPT

## 2024-03-30 PROCEDURE — 25000003 PHARM REV CODE 250: Performed by: HOSPITALIST

## 2024-03-30 PROCEDURE — 83036 HEMOGLOBIN GLYCOSYLATED A1C: CPT

## 2024-03-30 PROCEDURE — 86803 HEPATITIS C AB TEST: CPT | Performed by: PHYSICIAN ASSISTANT

## 2024-03-30 PROCEDURE — 99900035 HC TECH TIME PER 15 MIN (STAT)

## 2024-03-30 PROCEDURE — 85610 PROTHROMBIN TIME: CPT | Mod: 91

## 2024-03-30 PROCEDURE — 85610 PROTHROMBIN TIME: CPT | Performed by: HOSPITALIST

## 2024-03-30 PROCEDURE — 63600175 PHARM REV CODE 636 W HCPCS: Performed by: EMERGENCY MEDICINE

## 2024-03-30 PROCEDURE — 36415 COLL VENOUS BLD VENIPUNCTURE: CPT | Performed by: HOSPITALIST

## 2024-03-30 PROCEDURE — 96374 THER/PROPH/DIAG INJ IV PUSH: CPT

## 2024-03-30 PROCEDURE — 21400001 HC TELEMETRY ROOM

## 2024-03-30 PROCEDURE — 96375 TX/PRO/DX INJ NEW DRUG ADDON: CPT

## 2024-03-30 PROCEDURE — 82010 KETONE BODYS QUAN: CPT | Performed by: HOSPITALIST

## 2024-03-30 PROCEDURE — 99285 EMERGENCY DEPT VISIT HI MDM: CPT | Mod: 25

## 2024-03-30 PROCEDURE — 84484 ASSAY OF TROPONIN QUANT: CPT | Performed by: HOSPITALIST

## 2024-03-30 PROCEDURE — 83880 ASSAY OF NATRIURETIC PEPTIDE: CPT | Performed by: HOSPITALIST

## 2024-03-30 PROCEDURE — 83735 ASSAY OF MAGNESIUM: CPT

## 2024-03-30 PROCEDURE — 84100 ASSAY OF PHOSPHORUS: CPT

## 2024-03-30 PROCEDURE — 87389 HIV-1 AG W/HIV-1&-2 AB AG IA: CPT | Performed by: PHYSICIAN ASSISTANT

## 2024-03-30 PROCEDURE — 81001 URINALYSIS AUTO W/SCOPE: CPT

## 2024-03-30 PROCEDURE — 84134 ASSAY OF PREALBUMIN: CPT

## 2024-03-30 PROCEDURE — 82803 BLOOD GASES ANY COMBINATION: CPT

## 2024-03-30 PROCEDURE — 82306 VITAMIN D 25 HYDROXY: CPT

## 2024-03-30 PROCEDURE — 11000001 HC ACUTE MED/SURG PRIVATE ROOM

## 2024-03-30 PROCEDURE — 84466 ASSAY OF TRANSFERRIN: CPT

## 2024-03-30 PROCEDURE — 85730 THROMBOPLASTIN TIME PARTIAL: CPT

## 2024-03-30 PROCEDURE — 93010 ELECTROCARDIOGRAM REPORT: CPT | Mod: ,,, | Performed by: INTERNAL MEDICINE

## 2024-03-30 PROCEDURE — 80053 COMPREHEN METABOLIC PANEL: CPT | Performed by: EMERGENCY MEDICINE

## 2024-03-30 RX ORDER — MORPHINE SULFATE 4 MG/ML
4 INJECTION, SOLUTION INTRAMUSCULAR; INTRAVENOUS
Status: COMPLETED | OUTPATIENT
Start: 2024-03-30 | End: 2024-03-30

## 2024-03-30 RX ORDER — ATORVASTATIN CALCIUM 20 MG/1
20 TABLET, FILM COATED ORAL NIGHTLY
Status: DISCONTINUED | OUTPATIENT
Start: 2024-03-30 | End: 2024-04-03 | Stop reason: HOSPADM

## 2024-03-30 RX ORDER — ONDANSETRON HYDROCHLORIDE 2 MG/ML
4 INJECTION, SOLUTION INTRAVENOUS
Status: COMPLETED | OUTPATIENT
Start: 2024-03-30 | End: 2024-03-30

## 2024-03-30 RX ORDER — ASCORBIC ACID 500 MG
500 TABLET ORAL DAILY
COMMUNITY

## 2024-03-30 RX ORDER — CHOLECALCIFEROL (VITAMIN D3) 25 MCG
1000 TABLET ORAL DAILY
Status: DISCONTINUED | OUTPATIENT
Start: 2024-03-31 | End: 2024-04-03 | Stop reason: HOSPADM

## 2024-03-30 RX ORDER — ACETAMINOPHEN 500 MG
1000 TABLET ORAL ONCE
Status: COMPLETED | OUTPATIENT
Start: 2024-03-30 | End: 2024-03-30

## 2024-03-30 RX ORDER — OLOPATADINE HYDROCHLORIDE 1 MG/ML
1 SOLUTION/ DROPS OPHTHALMIC 2 TIMES DAILY PRN
Status: DISCONTINUED | OUTPATIENT
Start: 2024-03-30 | End: 2024-04-03 | Stop reason: HOSPADM

## 2024-03-30 RX ORDER — MONTELUKAST SODIUM 10 MG/1
10 TABLET ORAL NIGHTLY
Status: DISCONTINUED | OUTPATIENT
Start: 2024-03-30 | End: 2024-04-03 | Stop reason: HOSPADM

## 2024-03-30 RX ORDER — GABAPENTIN 100 MG/1
100 CAPSULE ORAL 2 TIMES DAILY
Status: DISCONTINUED | OUTPATIENT
Start: 2024-03-30 | End: 2024-04-01

## 2024-03-30 RX ORDER — LOSARTAN POTASSIUM 50 MG/1
50 TABLET ORAL
Status: DISCONTINUED | OUTPATIENT
Start: 2024-03-31 | End: 2024-03-31

## 2024-03-30 RX ORDER — LATANOPROST 50 UG/ML
1 SOLUTION/ DROPS OPHTHALMIC NIGHTLY
Status: DISCONTINUED | OUTPATIENT
Start: 2024-03-30 | End: 2024-04-03 | Stop reason: HOSPADM

## 2024-03-30 RX ORDER — ASCORBIC ACID 500 MG
500 TABLET ORAL DAILY
Status: DISCONTINUED | OUTPATIENT
Start: 2024-03-31 | End: 2024-04-03 | Stop reason: HOSPADM

## 2024-03-30 RX ORDER — INSULIN ASPART 100 [IU]/ML
3 INJECTION, SOLUTION INTRAVENOUS; SUBCUTANEOUS
Status: DISCONTINUED | OUTPATIENT
Start: 2024-03-31 | End: 2024-03-31

## 2024-03-30 RX ORDER — ALBUTEROL SULFATE 2.5 MG/.5ML
2.5 SOLUTION RESPIRATORY (INHALATION)
Status: DISCONTINUED | OUTPATIENT
Start: 2024-03-31 | End: 2024-03-31

## 2024-03-30 RX ORDER — INSULIN ASPART 100 [IU]/ML
0-5 INJECTION, SOLUTION INTRAVENOUS; SUBCUTANEOUS EVERY 6 HOURS PRN
Status: DISCONTINUED | OUTPATIENT
Start: 2024-03-30 | End: 2024-03-31

## 2024-03-30 RX ORDER — FLUTICASONE FUROATE AND VILANTEROL 200; 25 UG/1; UG/1
1 POWDER RESPIRATORY (INHALATION) DAILY
Status: DISCONTINUED | OUTPATIENT
Start: 2024-03-31 | End: 2024-04-03 | Stop reason: HOSPADM

## 2024-03-30 RX ORDER — GLUCAGON 1 MG
1 KIT INJECTION
Status: DISCONTINUED | OUTPATIENT
Start: 2024-03-30 | End: 2024-03-31

## 2024-03-30 RX ORDER — HYDROMORPHONE HYDROCHLORIDE 1 MG/ML
0.5 INJECTION, SOLUTION INTRAMUSCULAR; INTRAVENOUS; SUBCUTANEOUS EVERY 4 HOURS PRN
Status: DISCONTINUED | OUTPATIENT
Start: 2024-03-30 | End: 2024-04-01

## 2024-03-30 RX ORDER — LANOLIN ALCOHOL/MO/W.PET/CERES
400 CREAM (GRAM) TOPICAL DAILY
Status: DISCONTINUED | OUTPATIENT
Start: 2024-03-31 | End: 2024-04-03 | Stop reason: HOSPADM

## 2024-03-30 RX ORDER — LIDOCAINE HYDROCHLORIDE 30 MG/G
1 CREAM TOPICAL 2 TIMES DAILY
Status: ON HOLD | COMMUNITY
Start: 2024-03-11 | End: 2024-04-02 | Stop reason: HOSPADM

## 2024-03-30 RX ORDER — CETIRIZINE HYDROCHLORIDE 5 MG/1
5 TABLET ORAL NIGHTLY
Status: DISCONTINUED | OUTPATIENT
Start: 2024-03-30 | End: 2024-04-03 | Stop reason: HOSPADM

## 2024-03-30 RX ADMIN — MORPHINE SULFATE 4 MG: 4 INJECTION INTRAVENOUS at 08:03

## 2024-03-30 RX ADMIN — INSULIN DETEMIR 6 UNITS: 100 INJECTION, SOLUTION SUBCUTANEOUS at 11:03

## 2024-03-30 RX ADMIN — ATORVASTATIN CALCIUM 20 MG: 20 TABLET, FILM COATED ORAL at 11:03

## 2024-03-30 RX ADMIN — MONTELUKAST 10 MG: 10 TABLET, FILM COATED ORAL at 11:03

## 2024-03-30 RX ADMIN — LATANOPROST 1 DROP: 50 SOLUTION OPHTHALMIC at 11:03

## 2024-03-30 RX ADMIN — ONDANSETRON 4 MG: 2 INJECTION INTRAMUSCULAR; INTRAVENOUS at 08:03

## 2024-03-30 RX ADMIN — INSULIN HUMAN 5 UNITS: 100 INJECTION, SOLUTION PARENTERAL at 08:03

## 2024-03-30 RX ADMIN — CETIRIZINE HYDROCHLORIDE 5 MG: 5 TABLET, FILM COATED ORAL at 11:03

## 2024-03-30 RX ADMIN — ACETAMINOPHEN 1000 MG: 500 TABLET ORAL at 11:03

## 2024-03-30 RX ADMIN — GABAPENTIN 100 MG: 100 CAPSULE ORAL at 11:03

## 2024-03-30 NOTE — Clinical Note
Diagnosis: Closed intertrochanteric fracture of hip, left, initial encounter [[id number]]   Future Attending Provider: ALAN ALEXANDER [[id number]]   Reason for IP Medical Treatment  (Clinical interventions that can only be accomplished in the IP setting? ) :: Managment of aucte left hip fracture   I certify that Inpatient services for greater than or equal to 2 midnights are medically necessary:: Yes   Plans for Post-Acute care--if anticipated (pick the single best option):: F. IP Rehabilitation Unit Placement   Special Needs:: Insulin Drip q1hr [9]

## 2024-03-31 ENCOUNTER — ANESTHESIA (OUTPATIENT)
Dept: SURGERY | Facility: HOSPITAL | Age: 89
DRG: 480 | End: 2024-03-31
Payer: MEDICARE

## 2024-03-31 PROBLEM — I48.0 PAROXYSMAL ATRIAL FIBRILLATION: Status: ACTIVE | Noted: 2023-03-01

## 2024-03-31 PROBLEM — Z01.810 PREOP CARDIOVASCULAR EXAM: Status: RESOLVED | Noted: 2024-03-31 | Resolved: 2024-03-31

## 2024-03-31 PROBLEM — N17.9 AKI (ACUTE KIDNEY INJURY): Status: ACTIVE | Noted: 2024-03-31

## 2024-03-31 PROBLEM — K59.00 CONSTIPATION: Status: ACTIVE | Noted: 2024-03-31

## 2024-03-31 PROBLEM — D62 ACUTE BLOOD LOSS ANEMIA: Status: ACTIVE | Noted: 2024-03-31

## 2024-03-31 PROBLEM — E55.9 VITAMIN D INSUFFICIENCY: Status: ACTIVE | Noted: 2024-03-31

## 2024-03-31 PROBLEM — E83.42 HYPOMAGNESEMIA: Status: ACTIVE | Noted: 2024-03-31

## 2024-03-31 PROBLEM — Z01.810 PREOP CARDIOVASCULAR EXAM: Status: ACTIVE | Noted: 2024-03-31

## 2024-03-31 LAB
ABO + RH BLD: NORMAL
ALBUMIN SERPL BCP-MCNC: 2.8 G/DL (ref 3.5–5.2)
ALP SERPL-CCNC: 47 U/L (ref 55–135)
ALT SERPL W/O P-5'-P-CCNC: 46 U/L (ref 10–44)
ANION GAP SERPL CALC-SCNC: 7 MMOL/L (ref 8–16)
AST SERPL-CCNC: 36 U/L (ref 10–40)
BASOPHILS # BLD AUTO: 0.04 K/UL (ref 0–0.2)
BASOPHILS NFR BLD: 0.3 % (ref 0–1.9)
BILIRUB SERPL-MCNC: 0.4 MG/DL (ref 0.1–1)
BLD GP AB SCN CELLS X3 SERPL QL: NORMAL
BUN SERPL-MCNC: 34 MG/DL (ref 8–23)
CALCIUM SERPL-MCNC: 8.5 MG/DL (ref 8.7–10.5)
CHLORIDE SERPL-SCNC: 113 MMOL/L (ref 95–110)
CO2 SERPL-SCNC: 20 MMOL/L (ref 23–29)
CREAT SERPL-MCNC: 1.7 MG/DL (ref 0.5–1.4)
DIFFERENTIAL METHOD BLD: ABNORMAL
EOSINOPHIL # BLD AUTO: 0 K/UL (ref 0–0.5)
EOSINOPHIL # BLD AUTO: 0.1 K/UL (ref 0–0.5)
EOSINOPHIL # BLD AUTO: 0.2 K/UL (ref 0–0.5)
EOSINOPHIL NFR BLD: 0.1 % (ref 0–8)
EOSINOPHIL NFR BLD: 0.4 % (ref 0–8)
EOSINOPHIL NFR BLD: 1.5 % (ref 0–8)
ERYTHROCYTE [DISTWIDTH] IN BLOOD BY AUTOMATED COUNT: 14 % (ref 11.5–14.5)
ERYTHROCYTE [DISTWIDTH] IN BLOOD BY AUTOMATED COUNT: 14.2 % (ref 11.5–14.5)
ERYTHROCYTE [DISTWIDTH] IN BLOOD BY AUTOMATED COUNT: 14.6 % (ref 11.5–14.5)
EST. GFR  (NO RACE VARIABLE): 28.8 ML/MIN/1.73 M^2
GLUCOSE SERPL-MCNC: 303 MG/DL (ref 70–110)
HCT VFR BLD AUTO: 20.8 % (ref 37–48.5)
HCT VFR BLD AUTO: 23.7 % (ref 37–48.5)
HCT VFR BLD AUTO: 27.8 % (ref 37–48.5)
HGB BLD-MCNC: 6.9 G/DL (ref 12–16)
HGB BLD-MCNC: 7.7 G/DL (ref 12–16)
HGB BLD-MCNC: 8.9 G/DL (ref 12–16)
IMM GRANULOCYTES # BLD AUTO: 0.08 K/UL (ref 0–0.04)
IMM GRANULOCYTES # BLD AUTO: 0.08 K/UL (ref 0–0.04)
IMM GRANULOCYTES # BLD AUTO: 0.12 K/UL (ref 0–0.04)
IMM GRANULOCYTES NFR BLD AUTO: 0.6 % (ref 0–0.5)
IMM GRANULOCYTES NFR BLD AUTO: 0.6 % (ref 0–0.5)
IMM GRANULOCYTES NFR BLD AUTO: 0.8 % (ref 0–0.5)
INR PPP: 1.1 (ref 0.8–1.2)
INR PPP: 1.1 (ref 0.8–1.2)
LYMPHOCYTES # BLD AUTO: 1.3 K/UL (ref 1–4.8)
LYMPHOCYTES # BLD AUTO: 1.9 K/UL (ref 1–4.8)
LYMPHOCYTES # BLD AUTO: 1.9 K/UL (ref 1–4.8)
LYMPHOCYTES NFR BLD: 13.4 % (ref 18–48)
LYMPHOCYTES NFR BLD: 14.9 % (ref 18–48)
LYMPHOCYTES NFR BLD: 8.9 % (ref 18–48)
MAGNESIUM SERPL-MCNC: 1.3 MG/DL (ref 1.6–2.6)
MCH RBC QN AUTO: 31.1 PG (ref 27–31)
MCH RBC QN AUTO: 32.4 PG (ref 27–31)
MCH RBC QN AUTO: 33.2 PG (ref 27–31)
MCHC RBC AUTO-ENTMCNC: 32 G/DL (ref 32–36)
MCHC RBC AUTO-ENTMCNC: 32.5 G/DL (ref 32–36)
MCHC RBC AUTO-ENTMCNC: 33.2 G/DL (ref 32–36)
MCV RBC AUTO: 100 FL (ref 82–98)
MCV RBC AUTO: 100 FL (ref 82–98)
MCV RBC AUTO: 97 FL (ref 82–98)
MONOCYTES # BLD AUTO: 0.9 K/UL (ref 0.3–1)
MONOCYTES # BLD AUTO: 1 K/UL (ref 0.3–1)
MONOCYTES # BLD AUTO: 1 K/UL (ref 0.3–1)
MONOCYTES NFR BLD: 6.5 % (ref 4–15)
MONOCYTES NFR BLD: 7.3 % (ref 4–15)
MONOCYTES NFR BLD: 8.1 % (ref 4–15)
NEUTROPHILS # BLD AUTO: 11 K/UL (ref 1.8–7.7)
NEUTROPHILS # BLD AUTO: 11.8 K/UL (ref 1.8–7.7)
NEUTROPHILS # BLD AUTO: 9.5 K/UL (ref 1.8–7.7)
NEUTROPHILS NFR BLD: 75.7 % (ref 38–73)
NEUTROPHILS NFR BLD: 77.5 % (ref 38–73)
NEUTROPHILS NFR BLD: 82.8 % (ref 38–73)
NRBC BLD-RTO: 0 /100 WBC
OHS QRS DURATION: 100 MS
OHS QTC CALCULATION: 485 MS
PLATELET # BLD AUTO: 105 K/UL (ref 150–450)
PLATELET # BLD AUTO: 133 K/UL (ref 150–450)
PLATELET # BLD AUTO: 151 K/UL (ref 150–450)
PMV BLD AUTO: 11.8 FL (ref 9.2–12.9)
PMV BLD AUTO: 12.1 FL (ref 9.2–12.9)
PMV BLD AUTO: 12.1 FL (ref 9.2–12.9)
POCT GLUCOSE: 234 MG/DL (ref 70–110)
POCT GLUCOSE: 251 MG/DL (ref 70–110)
POCT GLUCOSE: 306 MG/DL (ref 70–110)
POCT GLUCOSE: 364 MG/DL (ref 70–110)
POCT GLUCOSE: 78 MG/DL (ref 70–110)
POTASSIUM SERPL-SCNC: 4.1 MMOL/L (ref 3.5–5.1)
PROT SERPL-MCNC: 4.8 G/DL (ref 6–8.4)
PROTHROMBIN TIME: 11.9 SEC (ref 9–12.5)
PROTHROMBIN TIME: 12.1 SEC (ref 9–12.5)
RBC # BLD AUTO: 2.08 M/UL (ref 4–5.4)
RBC # BLD AUTO: 2.38 M/UL (ref 4–5.4)
RBC # BLD AUTO: 2.86 M/UL (ref 4–5.4)
SODIUM SERPL-SCNC: 140 MMOL/L (ref 136–145)
SPECIMEN OUTDATE: NORMAL
TROPONIN I SERPL DL<=0.01 NG/ML-MCNC: 0.02 NG/ML (ref 0–0.03)
WBC # BLD AUTO: 12.56 K/UL (ref 3.9–12.7)
WBC # BLD AUTO: 14.2 K/UL (ref 3.9–12.7)
WBC # BLD AUTO: 14.2 K/UL (ref 3.9–12.7)

## 2024-03-31 PROCEDURE — 85610 PROTHROMBIN TIME: CPT | Performed by: HOSPITALIST

## 2024-03-31 PROCEDURE — 83735 ASSAY OF MAGNESIUM: CPT | Performed by: HOSPITALIST

## 2024-03-31 PROCEDURE — 63600175 PHARM REV CODE 636 W HCPCS: Performed by: ANESTHESIOLOGY

## 2024-03-31 PROCEDURE — 86850 RBC ANTIBODY SCREEN: CPT | Performed by: HOSPITALIST

## 2024-03-31 PROCEDURE — 36000710: Performed by: STUDENT IN AN ORGANIZED HEALTH CARE EDUCATION/TRAINING PROGRAM

## 2024-03-31 PROCEDURE — P9016 RBC LEUKOCYTES REDUCED: HCPCS | Performed by: STUDENT IN AN ORGANIZED HEALTH CARE EDUCATION/TRAINING PROGRAM

## 2024-03-31 PROCEDURE — C1769 GUIDE WIRE: HCPCS | Performed by: STUDENT IN AN ORGANIZED HEALTH CARE EDUCATION/TRAINING PROGRAM

## 2024-03-31 PROCEDURE — 76942 ECHO GUIDE FOR BIOPSY: CPT | Mod: 26,,, | Performed by: SURGERY

## 2024-03-31 PROCEDURE — 25000003 PHARM REV CODE 250: Performed by: HOSPITALIST

## 2024-03-31 PROCEDURE — 27245 TREAT THIGH FRACTURE: CPT | Mod: LT,,, | Performed by: STUDENT IN AN ORGANIZED HEALTH CARE EDUCATION/TRAINING PROGRAM

## 2024-03-31 PROCEDURE — 63600175 PHARM REV CODE 636 W HCPCS: Performed by: HOSPITALIST

## 2024-03-31 PROCEDURE — 85025 COMPLETE CBC W/AUTO DIFF WBC: CPT | Mod: 91 | Performed by: HOSPITALIST

## 2024-03-31 PROCEDURE — 25000242 PHARM REV CODE 250 ALT 637 W/ HCPCS: Performed by: HOSPITALIST

## 2024-03-31 PROCEDURE — 63600175 PHARM REV CODE 636 W HCPCS: Performed by: NURSE ANESTHETIST, CERTIFIED REGISTERED

## 2024-03-31 PROCEDURE — 37000008 HC ANESTHESIA 1ST 15 MINUTES: Performed by: STUDENT IN AN ORGANIZED HEALTH CARE EDUCATION/TRAINING PROGRAM

## 2024-03-31 PROCEDURE — 25000003 PHARM REV CODE 250: Performed by: NURSE ANESTHETIST, CERTIFIED REGISTERED

## 2024-03-31 PROCEDURE — 71000015 HC POSTOP RECOV 1ST HR: Performed by: STUDENT IN AN ORGANIZED HEALTH CARE EDUCATION/TRAINING PROGRAM

## 2024-03-31 PROCEDURE — 63600175 PHARM REV CODE 636 W HCPCS: Performed by: STUDENT IN AN ORGANIZED HEALTH CARE EDUCATION/TRAINING PROGRAM

## 2024-03-31 PROCEDURE — 71000033 HC RECOVERY, INTIAL HOUR: Performed by: STUDENT IN AN ORGANIZED HEALTH CARE EDUCATION/TRAINING PROGRAM

## 2024-03-31 PROCEDURE — 36430 TRANSFUSION BLD/BLD COMPNT: CPT

## 2024-03-31 PROCEDURE — 64999 UNLISTED PX NERVOUS SYSTEM: CPT | Mod: ,,, | Performed by: SURGERY

## 2024-03-31 PROCEDURE — 94640 AIRWAY INHALATION TREATMENT: CPT

## 2024-03-31 PROCEDURE — 85025 COMPLETE CBC W/AUTO DIFF WBC: CPT | Performed by: HOSPITALIST

## 2024-03-31 PROCEDURE — 36415 COLL VENOUS BLD VENIPUNCTURE: CPT | Performed by: HOSPITALIST

## 2024-03-31 PROCEDURE — 27201423 OPTIME MED/SURG SUP & DEVICES STERILE SUPPLY: Performed by: STUDENT IN AN ORGANIZED HEALTH CARE EDUCATION/TRAINING PROGRAM

## 2024-03-31 PROCEDURE — 71000016 HC POSTOP RECOV ADDL HR: Performed by: STUDENT IN AN ORGANIZED HEALTH CARE EDUCATION/TRAINING PROGRAM

## 2024-03-31 PROCEDURE — C1713 ANCHOR/SCREW BN/BN,TIS/BN: HCPCS | Performed by: STUDENT IN AN ORGANIZED HEALTH CARE EDUCATION/TRAINING PROGRAM

## 2024-03-31 PROCEDURE — 25000003 PHARM REV CODE 250: Performed by: STUDENT IN AN ORGANIZED HEALTH CARE EDUCATION/TRAINING PROGRAM

## 2024-03-31 PROCEDURE — 63600175 PHARM REV CODE 636 W HCPCS

## 2024-03-31 PROCEDURE — D9220A PRA ANESTHESIA: Mod: ANES,,, | Performed by: ANESTHESIOLOGY

## 2024-03-31 PROCEDURE — 86920 COMPATIBILITY TEST SPIN: CPT | Performed by: STUDENT IN AN ORGANIZED HEALTH CARE EDUCATION/TRAINING PROGRAM

## 2024-03-31 PROCEDURE — 0QS736Z REPOSITION LEFT UPPER FEMUR WITH INTRAMEDULLARY INTERNAL FIXATION DEVICE, PERCUTANEOUS APPROACH: ICD-10-PCS | Performed by: STUDENT IN AN ORGANIZED HEALTH CARE EDUCATION/TRAINING PROGRAM

## 2024-03-31 PROCEDURE — 84484 ASSAY OF TROPONIN QUANT: CPT | Performed by: HOSPITALIST

## 2024-03-31 PROCEDURE — 80053 COMPREHEN METABOLIC PANEL: CPT | Performed by: HOSPITALIST

## 2024-03-31 PROCEDURE — C1751 CATH, INF, PER/CENT/MIDLINE: HCPCS | Performed by: ANESTHESIOLOGY

## 2024-03-31 PROCEDURE — 82962 GLUCOSE BLOOD TEST: CPT | Performed by: STUDENT IN AN ORGANIZED HEALTH CARE EDUCATION/TRAINING PROGRAM

## 2024-03-31 PROCEDURE — D9220A PRA ANESTHESIA: Mod: CRNA,,, | Performed by: NURSE ANESTHETIST, CERTIFIED REGISTERED

## 2024-03-31 PROCEDURE — 30233N1 TRANSFUSION OF NONAUTOLOGOUS RED BLOOD CELLS INTO PERIPHERAL VEIN, PERCUTANEOUS APPROACH: ICD-10-PCS | Performed by: HOSPITALIST

## 2024-03-31 PROCEDURE — 36000711: Performed by: STUDENT IN AN ORGANIZED HEALTH CARE EDUCATION/TRAINING PROGRAM

## 2024-03-31 PROCEDURE — 36415 COLL VENOUS BLD VENIPUNCTURE: CPT | Mod: XB | Performed by: HOSPITALIST

## 2024-03-31 PROCEDURE — 21400001 HC TELEMETRY ROOM

## 2024-03-31 PROCEDURE — 94761 N-INVAS EAR/PLS OXIMETRY MLT: CPT

## 2024-03-31 PROCEDURE — 37000009 HC ANESTHESIA EA ADD 15 MINS: Performed by: STUDENT IN AN ORGANIZED HEALTH CARE EDUCATION/TRAINING PROGRAM

## 2024-03-31 DEVICE — IMPLANTABLE DEVICE: Type: IMPLANTABLE DEVICE | Site: FEMUR | Status: FUNCTIONAL

## 2024-03-31 DEVICE — SCREW LOCKING BONE T2 5X42MM: Type: IMPLANTABLE DEVICE | Site: FEMUR | Status: FUNCTIONAL

## 2024-03-31 RX ORDER — AMOXICILLIN 250 MG
1 CAPSULE ORAL 2 TIMES DAILY
Status: DISCONTINUED | OUTPATIENT
Start: 2024-03-31 | End: 2024-04-03 | Stop reason: HOSPADM

## 2024-03-31 RX ORDER — TALC
6 POWDER (GRAM) TOPICAL NIGHTLY PRN
Status: DISCONTINUED | OUTPATIENT
Start: 2024-03-31 | End: 2024-04-03 | Stop reason: HOSPADM

## 2024-03-31 RX ORDER — SODIUM CHLORIDE 9 MG/ML
INJECTION, SOLUTION INTRAVENOUS CONTINUOUS
Status: ACTIVE | OUTPATIENT
Start: 2024-03-31 | End: 2024-03-31

## 2024-03-31 RX ORDER — ONDANSETRON HYDROCHLORIDE 2 MG/ML
4 INJECTION, SOLUTION INTRAVENOUS EVERY 12 HOURS PRN
Status: DISCONTINUED | OUTPATIENT
Start: 2024-03-31 | End: 2024-04-03 | Stop reason: HOSPADM

## 2024-03-31 RX ORDER — SODIUM CHLORIDE 0.9 % (FLUSH) 0.9 %
10 SYRINGE (ML) INJECTION
Status: DISCONTINUED | OUTPATIENT
Start: 2024-03-31 | End: 2024-04-03 | Stop reason: HOSPADM

## 2024-03-31 RX ORDER — ROCURONIUM BROMIDE 10 MG/ML
INJECTION, SOLUTION INTRAVENOUS
Status: DISCONTINUED | OUTPATIENT
Start: 2024-03-31 | End: 2024-03-31

## 2024-03-31 RX ORDER — POLYETHYLENE GLYCOL 3350 17 G/17G
17 POWDER, FOR SOLUTION ORAL DAILY
Status: DISCONTINUED | OUTPATIENT
Start: 2024-03-31 | End: 2024-03-31

## 2024-03-31 RX ORDER — INSULIN ASPART 100 [IU]/ML
6 INJECTION, SOLUTION INTRAVENOUS; SUBCUTANEOUS
Status: DISCONTINUED | OUTPATIENT
Start: 2024-04-01 | End: 2024-04-01

## 2024-03-31 RX ORDER — INSULIN ASPART 100 [IU]/ML
9 INJECTION, SOLUTION INTRAVENOUS; SUBCUTANEOUS
Status: DISCONTINUED | OUTPATIENT
Start: 2024-03-31 | End: 2024-03-31

## 2024-03-31 RX ORDER — LIDOCAINE HYDROCHLORIDE 20 MG/ML
INJECTION INTRAVENOUS
Status: DISCONTINUED | OUTPATIENT
Start: 2024-03-31 | End: 2024-03-31

## 2024-03-31 RX ORDER — INSULIN ASPART 100 [IU]/ML
0-10 INJECTION, SOLUTION INTRAVENOUS; SUBCUTANEOUS
Status: DISCONTINUED | OUTPATIENT
Start: 2024-03-31 | End: 2024-04-03 | Stop reason: HOSPADM

## 2024-03-31 RX ORDER — ROPIVACAINE HYDROCHLORIDE 2 MG/ML
0.1 INJECTION, SOLUTION EPIDURAL; INFILTRATION; PERINEURAL CONTINUOUS
Status: DISCONTINUED | OUTPATIENT
Start: 2024-03-31 | End: 2024-04-03

## 2024-03-31 RX ORDER — FENTANYL CITRATE 50 UG/ML
25 INJECTION, SOLUTION INTRAMUSCULAR; INTRAVENOUS EVERY 5 MIN PRN
Status: DISCONTINUED | OUTPATIENT
Start: 2024-03-31 | End: 2024-04-01

## 2024-03-31 RX ORDER — METHOCARBAMOL 500 MG/1
500 TABLET, FILM COATED ORAL EVERY 6 HOURS PRN
Status: DISCONTINUED | OUTPATIENT
Start: 2024-03-31 | End: 2024-04-02

## 2024-03-31 RX ORDER — HYDROCODONE BITARTRATE AND ACETAMINOPHEN 500; 5 MG/1; MG/1
TABLET ORAL
Status: DISCONTINUED | OUTPATIENT
Start: 2024-03-31 | End: 2024-04-03 | Stop reason: HOSPADM

## 2024-03-31 RX ORDER — POLYETHYLENE GLYCOL 3350 17 G/17G
17 POWDER, FOR SOLUTION ORAL DAILY
Status: DISCONTINUED | OUTPATIENT
Start: 2024-03-31 | End: 2024-04-03 | Stop reason: HOSPADM

## 2024-03-31 RX ORDER — MUPIROCIN 20 MG/G
1 OINTMENT TOPICAL
Status: CANCELLED | OUTPATIENT
Start: 2024-03-31

## 2024-03-31 RX ORDER — OXYCODONE HYDROCHLORIDE 5 MG/1
5 TABLET ORAL
Status: DISCONTINUED | OUTPATIENT
Start: 2024-03-31 | End: 2024-04-01

## 2024-03-31 RX ORDER — GLUCAGON 1 MG
1 KIT INJECTION
Status: DISCONTINUED | OUTPATIENT
Start: 2024-03-31 | End: 2024-04-03 | Stop reason: HOSPADM

## 2024-03-31 RX ORDER — BISACODYL 10 MG/1
10 SUPPOSITORY RECTAL DAILY PRN
Status: DISCONTINUED | OUTPATIENT
Start: 2024-03-31 | End: 2024-04-03 | Stop reason: HOSPADM

## 2024-03-31 RX ORDER — ALBUTEROL SULFATE 2.5 MG/.5ML
2.5 SOLUTION RESPIRATORY (INHALATION) EVERY 4 HOURS PRN
Status: DISCONTINUED | OUTPATIENT
Start: 2024-03-31 | End: 2024-04-03 | Stop reason: HOSPADM

## 2024-03-31 RX ORDER — ACETAMINOPHEN 500 MG
1000 TABLET ORAL EVERY 8 HOURS
Status: DISCONTINUED | OUTPATIENT
Start: 2024-03-31 | End: 2024-03-31

## 2024-03-31 RX ORDER — ROPIVACAINE HYDROCHLORIDE 5 MG/ML
INJECTION, SOLUTION EPIDURAL; INFILTRATION; PERINEURAL
Status: COMPLETED | OUTPATIENT
Start: 2024-03-31 | End: 2024-03-31

## 2024-03-31 RX ORDER — HALOPERIDOL 5 MG/ML
0.5 INJECTION INTRAMUSCULAR EVERY 10 MIN PRN
Status: DISCONTINUED | OUTPATIENT
Start: 2024-03-31 | End: 2024-04-01

## 2024-03-31 RX ORDER — VANCOMYCIN HYDROCHLORIDE 1 G/20ML
INJECTION, POWDER, LYOPHILIZED, FOR SOLUTION INTRAVENOUS
Status: DISCONTINUED | OUTPATIENT
Start: 2024-03-31 | End: 2024-03-31 | Stop reason: HOSPADM

## 2024-03-31 RX ORDER — ACETAMINOPHEN 500 MG
1000 TABLET ORAL EVERY 6 HOURS
Status: COMPLETED | OUTPATIENT
Start: 2024-03-31 | End: 2024-04-02

## 2024-03-31 RX ORDER — IBUPROFEN 200 MG
16 TABLET ORAL
Status: DISCONTINUED | OUTPATIENT
Start: 2024-03-31 | End: 2024-04-03 | Stop reason: HOSPADM

## 2024-03-31 RX ORDER — TRANEXAMIC ACID 100 MG/ML
INJECTION, SOLUTION INTRAVENOUS
Status: DISCONTINUED | OUTPATIENT
Start: 2024-03-31 | End: 2024-03-31

## 2024-03-31 RX ORDER — PHENYLEPHRINE HYDROCHLORIDE 10 MG/ML
INJECTION INTRAVENOUS
Status: DISCONTINUED | OUTPATIENT
Start: 2024-03-31 | End: 2024-03-31

## 2024-03-31 RX ORDER — AMOXICILLIN 250 MG
1 CAPSULE ORAL DAILY
Status: DISCONTINUED | OUTPATIENT
Start: 2024-03-31 | End: 2024-03-31

## 2024-03-31 RX ORDER — ONDANSETRON HYDROCHLORIDE 2 MG/ML
INJECTION, SOLUTION INTRAVENOUS
Status: DISCONTINUED | OUTPATIENT
Start: 2024-03-31 | End: 2024-03-31

## 2024-03-31 RX ORDER — IBUPROFEN 200 MG
24 TABLET ORAL
Status: DISCONTINUED | OUTPATIENT
Start: 2024-03-31 | End: 2024-04-03 | Stop reason: HOSPADM

## 2024-03-31 RX ORDER — INSULIN ASPART 100 [IU]/ML
6 INJECTION, SOLUTION INTRAVENOUS; SUBCUTANEOUS
Status: DISCONTINUED | OUTPATIENT
Start: 2024-03-31 | End: 2024-03-31

## 2024-03-31 RX ORDER — MAGNESIUM SULFATE HEPTAHYDRATE 40 MG/ML
2 INJECTION, SOLUTION INTRAVENOUS ONCE
Status: COMPLETED | OUTPATIENT
Start: 2024-03-31 | End: 2024-03-31

## 2024-03-31 RX ORDER — MUPIROCIN 20 MG/G
1 OINTMENT TOPICAL 2 TIMES DAILY
Status: DISCONTINUED | OUTPATIENT
Start: 2024-03-31 | End: 2024-04-03 | Stop reason: HOSPADM

## 2024-03-31 RX ORDER — FENTANYL CITRATE 50 UG/ML
INJECTION, SOLUTION INTRAMUSCULAR; INTRAVENOUS
Status: DISCONTINUED | OUTPATIENT
Start: 2024-03-31 | End: 2024-03-31

## 2024-03-31 RX ORDER — ONDANSETRON 8 MG/1
8 TABLET, ORALLY DISINTEGRATING ORAL EVERY 6 HOURS PRN
Status: DISCONTINUED | OUTPATIENT
Start: 2024-03-31 | End: 2024-04-03 | Stop reason: HOSPADM

## 2024-03-31 RX ORDER — FENTANYL CITRATE 50 UG/ML
25 INJECTION, SOLUTION INTRAMUSCULAR; INTRAVENOUS EVERY 5 MIN PRN
Status: COMPLETED | OUTPATIENT
Start: 2024-03-31 | End: 2024-03-31

## 2024-03-31 RX ORDER — LIDOCAINE HYDROCHLORIDE 10 MG/ML
1 INJECTION, SOLUTION EPIDURAL; INFILTRATION; INTRACAUDAL; PERINEURAL
Status: CANCELLED | OUTPATIENT
Start: 2024-03-31

## 2024-03-31 RX ORDER — FAMOTIDINE 10 MG/ML
INJECTION INTRAVENOUS
Status: DISCONTINUED | OUTPATIENT
Start: 2024-03-31 | End: 2024-03-31

## 2024-03-31 RX ORDER — PROPOFOL 10 MG/ML
VIAL (ML) INTRAVENOUS
Status: DISCONTINUED | OUTPATIENT
Start: 2024-03-31 | End: 2024-03-31

## 2024-03-31 RX ADMIN — ACETAMINOPHEN 1000 MG: 500 TABLET ORAL at 06:03

## 2024-03-31 RX ADMIN — INSULIN ASPART 6 UNITS: 100 INJECTION, SOLUTION INTRAVENOUS; SUBCUTANEOUS at 03:03

## 2024-03-31 RX ADMIN — ACETAMINOPHEN 1000 MG: 500 TABLET ORAL at 12:03

## 2024-03-31 RX ADMIN — MUPIROCIN 1 G: 20 OINTMENT TOPICAL at 09:03

## 2024-03-31 RX ADMIN — CEFAZOLIN 2 G: 2 INJECTION, POWDER, FOR SOLUTION INTRAMUSCULAR; INTRAVENOUS at 09:03

## 2024-03-31 RX ADMIN — INSULIN DETEMIR 9 UNITS: 100 INJECTION, SOLUTION SUBCUTANEOUS at 09:03

## 2024-03-31 RX ADMIN — POLYETHYLENE GLYCOL 3350 17 G: 17 POWDER, FOR SOLUTION ORAL at 12:03

## 2024-03-31 RX ADMIN — ALBUTEROL SULFATE 2.5 MG: 2.5 SOLUTION RESPIRATORY (INHALATION) at 07:03

## 2024-03-31 RX ADMIN — METHOCARBAMOL 500 MG: 500 TABLET ORAL at 01:03

## 2024-03-31 RX ADMIN — APIXABAN 2.5 MG: 2.5 TABLET, FILM COATED ORAL at 09:03

## 2024-03-31 RX ADMIN — DOCUSATE SODIUM AND SENNOSIDES 1 TABLET: 8.6; 5 TABLET, FILM COATED ORAL at 12:03

## 2024-03-31 RX ADMIN — PROPOFOL 100 MG: 10 INJECTION, EMULSION INTRAVENOUS at 09:03

## 2024-03-31 RX ADMIN — ROCURONIUM BROMIDE 40 MG: 10 INJECTION, SOLUTION INTRAVENOUS at 09:03

## 2024-03-31 RX ADMIN — FENTANYL CITRATE 25 MCG: 50 INJECTION INTRAMUSCULAR; INTRAVENOUS at 11:03

## 2024-03-31 RX ADMIN — LIDOCAINE HYDROCHLORIDE 75 MG: 20 INJECTION INTRAVENOUS at 09:03

## 2024-03-31 RX ADMIN — PANCRELIPASE 2 CAPSULE: 24000; 76000; 120000 CAPSULE, DELAYED RELEASE PELLETS ORAL at 02:03

## 2024-03-31 RX ADMIN — LATANOPROST 1 DROP: 50 SOLUTION OPHTHALMIC at 09:03

## 2024-03-31 RX ADMIN — FENTANYL CITRATE 50 MCG: 50 INJECTION, SOLUTION INTRAMUSCULAR; INTRAVENOUS at 09:03

## 2024-03-31 RX ADMIN — ROPIVACAINE HYDROCHLORIDE 15 ML: 5 INJECTION EPIDURAL; INFILTRATION; PERINEURAL at 09:03

## 2024-03-31 RX ADMIN — ROPIVACAINE HYDROCHLORIDE 0.1 ML/HR: 2 INJECTION, SOLUTION EPIDURAL; INFILTRATION at 11:03

## 2024-03-31 RX ADMIN — INSULIN ASPART 4 UNITS: 100 INJECTION, SOLUTION INTRAVENOUS; SUBCUTANEOUS at 09:03

## 2024-03-31 RX ADMIN — INSULIN ASPART 9 UNITS: 100 INJECTION, SOLUTION INTRAVENOUS; SUBCUTANEOUS at 04:03

## 2024-03-31 RX ADMIN — SODIUM CHLORIDE: 9 INJECTION, SOLUTION INTRAVENOUS at 01:03

## 2024-03-31 RX ADMIN — MAGNESIUM SULFATE HEPTAHYDRATE 2 G: 40 INJECTION, SOLUTION INTRAVENOUS at 06:03

## 2024-03-31 RX ADMIN — FAMOTIDINE 20 MG: 10 INJECTION, SOLUTION INTRAVENOUS at 09:03

## 2024-03-31 RX ADMIN — PHENYLEPHRINE HYDROCHLORIDE 200 MCG: 10 INJECTION INTRAVENOUS at 09:03

## 2024-03-31 RX ADMIN — FENTANYL CITRATE 25 MCG: 50 INJECTION INTRAMUSCULAR; INTRAVENOUS at 12:03

## 2024-03-31 RX ADMIN — PHENYLEPHRINE HYDROCHLORIDE 200 MCG: 10 INJECTION INTRAVENOUS at 10:03

## 2024-03-31 RX ADMIN — OXYCODONE 5 MG: 5 TABLET ORAL at 03:03

## 2024-03-31 RX ADMIN — ONDANSETRON 4 MG: 2 INJECTION INTRAMUSCULAR; INTRAVENOUS at 10:03

## 2024-03-31 RX ADMIN — SODIUM CHLORIDE, SODIUM GLUCONATE, SODIUM ACETATE, POTASSIUM CHLORIDE, MAGNESIUM CHLORIDE, SODIUM PHOSPHATE, DIBASIC, AND POTASSIUM PHOSPHATE: .53; .5; .37; .037; .03; .012; .00082 INJECTION, SOLUTION INTRAVENOUS at 10:03

## 2024-03-31 RX ADMIN — SUGAMMADEX 200 MG: 100 INJECTION, SOLUTION INTRAVENOUS at 10:03

## 2024-03-31 RX ADMIN — OXYCODONE 5 MG: 5 TABLET ORAL at 12:03

## 2024-03-31 RX ADMIN — CETIRIZINE HYDROCHLORIDE 5 MG: 5 TABLET, FILM COATED ORAL at 09:03

## 2024-03-31 RX ADMIN — Medication 2 G: at 10:03

## 2024-03-31 RX ADMIN — ONDANSETRON 8 MG: 8 TABLET, ORALLY DISINTEGRATING ORAL at 02:03

## 2024-03-31 RX ADMIN — GABAPENTIN 100 MG: 100 CAPSULE ORAL at 09:03

## 2024-03-31 RX ADMIN — ATORVASTATIN CALCIUM 20 MG: 20 TABLET, FILM COATED ORAL at 09:03

## 2024-03-31 RX ADMIN — FENTANYL CITRATE 25 MCG: 50 INJECTION INTRAMUSCULAR; INTRAVENOUS at 01:03

## 2024-03-31 RX ADMIN — DOCUSATE SODIUM AND SENNOSIDES 1 TABLET: 8.6; 5 TABLET, FILM COATED ORAL at 09:03

## 2024-03-31 RX ADMIN — MONTELUKAST 10 MG: 10 TABLET, FILM COATED ORAL at 09:03

## 2024-03-31 RX ADMIN — SODIUM CHLORIDE, SODIUM GLUCONATE, SODIUM ACETATE, POTASSIUM CHLORIDE, MAGNESIUM CHLORIDE, SODIUM PHOSPHATE, DIBASIC, AND POTASSIUM PHOSPHATE: .53; .5; .37; .037; .03; .012; .00082 INJECTION, SOLUTION INTRAVENOUS at 09:03

## 2024-03-31 RX ADMIN — PHENYLEPHRINE HYDROCHLORIDE 100 MCG: 10 INJECTION INTRAVENOUS at 10:03

## 2024-03-31 RX ADMIN — INSULIN ASPART 4 UNITS: 100 INJECTION, SOLUTION INTRAVENOUS; SUBCUTANEOUS at 04:03

## 2024-03-31 RX ADMIN — TRANEXAMIC ACID 1000 MG: 100 INJECTION INTRAVENOUS at 10:03

## 2024-03-31 RX ADMIN — SODIUM CHLORIDE 1000 ML: 9 INJECTION, SOLUTION INTRAVENOUS at 06:03

## 2024-03-31 NOTE — BRIEF OP NOTE
[facility] - [facility]  Brief Operative Note    SUMMARY     Surgery Date: 3/31/2024     Surgeon(s) and Role:     * Nakul Walker MD - Primary     * Manjeet San MD - Resident - Assisting     * Washington Martin MD - Resident - Assisting     * KENDAL Dumont MD - Resident - Assisting     * PEGGY Guido MD - Resident - Assisting        Pre-op Diagnosis:  Closed fracture of left hip, initial encounter [S72.002A]    Post-op Diagnosis:  Post-Op Diagnosis Codes:     * Closed fracture of left hip, initial encounter [S72.002A]    Procedure(s) (LRB):  INSERTION, INTRAMEDULLARY CAYDEN, FEMUR - Left, (Left)    Anesthesia: General/Regional    Implants:  Implant Name Type Inv. Item Serial No.  Lot No. LRB No. Used Action   GAMMA4 LONG NAIL, LEFT 11 X 360 MM X 125 DEGREE    Identiv [id number] Left 1 Implanted   GAMMA4 LEG SCREW 10.5 X 105 MM    Identiv [id number] Left 1 Implanted   SCREW LOCKING BONE T2 5X42MM - [id number]  SCREW LOCKING BONE T2 5X42MM  Smart[employer]. [id number] Left 1 Implanted       Operative Findings: Left femur IMN    Estimated Blood Loss: 100cc         Specimens:   Specimen (24h ago, onward)      None            [id number]

## 2024-03-31 NOTE — ASSESSMENT & PLAN NOTE
Per overview an indolent incidental finding that is being observed with surveillance as an outpatient, on no active treatment.

## 2024-03-31 NOTE — ASSESSMENT & PLAN NOTE
Creatine stable for now. BMP reviewed- noted Estimated Creatinine Clearance: 15.5 mL/min (A) (based on SCr of 1.7 mg/dL (H)). according to latest data. Based on current GFR, CKD stage is stage 3 - GFR 30-59.  Monitor UOP and serial BMP and adjust therapy as needed. Renally dose meds. Avoid nephrotoxic medications and procedures.  Baseline Cr 1.2 to 1.7 with Fahda with Cr 2. 1 on admit. Given IVF on admit with improving Cr now to 1.7 back within baseline ranges  -on ARB at home, cont lower dose here now that cr improving of losartan given hers not on formulary

## 2024-03-31 NOTE — CARE UPDATE
RAPID RESPONSE NURSE FOLLOW-UP NOTE       Followed up with patient for an AI alert.  Patient with H/H drop 9.9/30 to 7.7/23. Type & screen in process. Mag 1.3.   H. TEREZA Ramirez with  team H notified of lab results, mag replacement ordered.  Reviewed plan of care with bedside RNThien . States patient vital signs stable, no acute change in exam. No s/s of external hemorrhage. Plan for OR this AM  Team will continue to follow.  Please call Rapid Response RNNETTE RN with any questions or concerns at [phone].

## 2024-03-31 NOTE — NURSING TRANSFER
Nursing Transfer Note      3/31/2024   12:58 PM    Nurse giving handoff: RAISA Ward RN PACU  Nurse receiving handoff: Kimberly FUENTES POST OP    Reason patient is being transferred: post surgery    Transfer To: [id number]    Transfer via bed    Transfer with cardiac monitoring, SIFI pump    Transported by transport personnel x2    Transfer Vital Signs:  Please see flow sheet    Telemetry: Box Number [id number], Rate 81, Rhythm NSR, and Telemetry  Skyla  Order for Tele Monitor? Yes    Additional Lines: Guevara Catheter    4eyes on Skin: yes    Medicines sent: ropivacaine bag infusing    Any special needs or follow-up needed: routine    Patient belongings transferred with patient: No    Chart send with patient: Yes    Patient reassessed at: 3/31/2024 at 1330  1  Upon arrival to floor: cardiac monitor applied, patient oriented to room, and bed in lowest position

## 2024-03-31 NOTE — ASSESSMENT & PLAN NOTE
Patient's anemia is currently controlled. Has received 1 units of PRBCs on 3/31 . Etiology likely d/t acute blood loss which was from surgical losses and inflammation from fracture with baseline anemia at 9.9  Current CBC reviewed-   Lab Results   Component Value Date    HGB 7.7 (L) 03/31/2024    HCT 23.7 (L) 03/31/2024     Monitor serial CBC and transfuse if patient becomes hemodynamically unstable, symptomatic or H/H drops below 7/21.

## 2024-03-31 NOTE — ASSESSMENT & PLAN NOTE
Vit D mildly low at 27 and replacement started  -associated with fx related osteoperosis  -resume home dosage  -patient on PROLIA every 6 months - last dose in Feb 2024

## 2024-03-31 NOTE — TRANSFER OF CARE
"Anesthesia Transfer of Care Note    Patient: Sussy Costa    Procedure(s) Performed: Procedure(s) (LRB):  INSERTION, INTRAMEDULLARY CAYDEN, FEMUR - Left, (Left)    Patient location: PACU    Anesthesia Type: general    Transport from OR: Transported from OR on 6-10 L/min O2 by face mask with adequate spontaneous ventilation    Post pain: adequate analgesia    Post assessment: no apparent anesthetic complications and tolerated procedure well    Post vital signs: stable    Level of consciousness: awake, alert and oriented    Nausea/Vomiting: no nausea/vomiting    Complications: none    Transfer of care protocol was followed      Last vitals: Visit Vitals  BP (!) 112/59 (BP Location: Right arm, Patient Position: Lying)   Pulse 83   Temp 36.7 °C (98 °F) (Oral)   Resp 16   Ht 5' 5" (1.651 m)   Wt 42 kg (92 lb 9.5 oz)   SpO2 100%   BMI 15.41 kg/m²     "

## 2024-03-31 NOTE — ASSESSMENT & PLAN NOTE
Chronic, controlled. Latest blood pressure and vitals reviewed-   Home meds for hypertension were reviewed and noted below.   Hypertension Medications               chlorthalidone (HYGROTEN) 25 MG Tab Take 1 tablet (25 mg total) by mouth once daily.    irbesartan (AVAPRO) 300 MG tablet Take 1 tablet (300 mg total) by mouth every evening.            While in the hospital, will manage blood pressure as follows; Adjust home antihypertensive regimen as follows- Cr improving so okay to keep on losartan 50 to start 3/31 (  equivalent of home dose irbesartan is losartan 100mg,) titrate as needed. Hold chlorthalidone for now given improving biju

## 2024-03-31 NOTE — ASSESSMENT & PLAN NOTE
Patient undergoing non-emergent non-cardiac surgery.  Patient does not have active cardiac problems (unstable angina, decompensated heart failure, significant uncontrolled tachy or [place] arrhythmias or severe valvular heart disease or recent MI within past 30 days).  Patient undergoing intermediate risk surgery.  Functional Status: Patient has functional METs > or = 4  5.72 by DASI    Revised cardiac risk index (RCRI) score is 2.    (1 point each for any of the following conditions: Ischemic Heart Disease/CAD, Cerebrovascular Disease, Compensated congestive heart failure, Diabetes requiring insulin therapy, Serum creatinine > 2)   ACS-NQSIP  Risk Calculator - 2.7% cardiac complication        Other Issues:       Patient on long term anticoagulation: No      Recent coronary stenting: No    Patient with acceptable cardiac risk with (RCRI score of 2) going for intermediate risk surgery. No absolute contraindications to the proposed surgery at this time. low risk of post-op pulmonary complications.  intermediate risk of post-op delirium.   - Okay to proceed with planned surgery with no additional cardiac testing needed prior to surgery.      - for high risk of post-op delirium, minimize CNS-active meds (opiates, benzos, anticholinergics) and sleep hygiene with shades open during day, no daytime naps, frequent re-orientation, private room if feasible

## 2024-03-31 NOTE — OP NOTE
OPERATIVE NOTE     DATE OF PROCEDURE:  3/31/2024     PREOPERATIVE DIAGNOSIS:           Left intertrochanteric hip fracture, closed, displaced, initial encounter  Fall from standing     POSTOPERATIVE DIAGNOSIS:         Left intertrochanteric hip fracture, closed, displaced, initial encounter  Fall from standing     PROCEDURE:              Open reduction internal fixation left intertrochanteric hip fracture with intramedullary nail     SURGEON:       Nakul Walker MD      ASSISTANT:                 MD Washington Snow MD Evan Few, MD     ANESTHESIA:              General     EBL:                  100 mL     COMPLICATIONS:  none     IMPLANTS:       Implant Name Type Inv. Item Serial No.  Lot No. LRB No. Used Action   GUIDE WIRE [id number] BALL TIP - [id number]  GUIDE WIRE [id number] BALL TIP  CAIN [employer] GRACIE. [id number] Left 1 Implanted and Explanted   WIRE LARISSA T2 [id number] SS - [id number]  WIRE LARISSA T2 [id number]  CAIN [employer] GRACIE. [id number] Left 1 Implanted and Explanted   GAMMA4 LONG NAIL, LEFT 11 X 360 MM X 125 DEGREE    CAIN [id number] Left 1 Implanted   GAMMA4 LEG SCREW 10.5 X 105 MM    CAIN [id number] Left 1 Implanted   SCREW LOCKING BONE T2 5X40MM - [id number]  SCREW LOCKING BONE T2 5X40MM  CAIN [employer] GRACIE. [id number] Left 1 Implanted and Explanted   SCREW LOCKING BONE T2 5X42MM - [id number]  SCREW LOCKING BONE T2 5X42MM  CAIN [employer] GRACIE. [id number] Left 1 Implanted        SPECIMENS:    None     INDICATIONS FOR PROCEDURE:  Patient suffered a ground level fall from standing prior to arrival in the ED and presented with a left intertrochanteric hip fracture. Fracture site was closed and left lower extremity was neurovascularly intact. She lives at home in a single story house with family, walks independently at baseline      We discussed the diagnosis of intertrochanteric hip fracture with both patient and family members.  Discussed both non operative and operative management  options.  Non operative management would consist of bed rest, protected weight bearing and would likely result in a malunion/nonunion, prolonged pain, debility, and the medical comorbidities associated with prolonged bed rest/immobility.  Discussed operative intervention the form of open reduction internal fixation with intramedullary nail.  Hopefully this would improve pain, alignment, mobility, healing potential, overall outcome.     The risks, benefits, and alternatives to surgery were discussed with the patient and/or family.  Specific risks discussed included, but were not limited to:  Limb length discrepancy, malrotation, head perforation, avascular necrosis, hip arthritis, abductor pain, limp, gait difficulty, failure of hardware, damage to nearby structures, including neurovascular structures leading to loss of function or loss of limb, bleeding, need for blood transfusion, pain, stiffness, scarring, numbness, tingling, weakness, compartment syndrome, malunion/nonunion, hardware failure, hardware prominence, infection, need for multiple staged procedures, prolonged antibiotics, iatrogenic fracture, heterotopic ossification, arthritis, a variety of medical complications including but not limited to heart attack, stroke, deep venous thrombosis, pulmonary embolism, prolonged hospitalization, prolonged intubation, and death.   Patient and/or family expressed an understanding and desires to proceed with surgery.     All questions were answered.  No guarantees were implied or stated.  Informed consent was obtained.        OPERATIVE PROCEDURE:  Patient met in the preop hold area, the correct site and side of surgery being the left hip were marked and verified.  Regional block placed by Anesthesia.  Patient brought back to the operative suite.     General anesthesia was smoothly induced     Fracture boots were placed.  Patient was transferred over to operative table and placed in supine position.  Peroneal post was  placed. Operative side arm was draped across the chest and well padded. All bony prominences were appropriately padded.  We performed traction and reduction maneuvers, and were able to obtain a closed reduction of the fracture.  Left lower extremity was prepped and draped in normal sterile fashion. Preoperative antibiotics of Ancef 2g were given.     Time-out was performed verifying the correct patient, site/side of surgery, surgical consent, radiographs as applicable, preop antibiotics, necessary equipment, anticipated blood loss, length of procedure, postoperative disposition.     We began the case by marking anatomic landmarks on the patient. Incision was made proximal to the greater trochanter.  Fascia was incised. Guide pin was entered in a center center position confirmed on both AP and lateral fluoroscopic imaging.  It was advanced to the level of the lesser trochanter. Opening Reamer with appropriate soft tissue guide was utilized to open the canal.  Ball-tipped guidewire was inserted in an intramedullary location confirmed on fluoroscopic imaging.  This was advanced to the superior pole of patella. Nail was measured. We reamed once with a 12.5mm reamer to confirm that a 11 mm nail would fit.  Appropriate size/length nail was placed on the insertion jig and inserted into the intramedullary canal, confirmed on multiplanar fluoroscopic imaging.  This was inserted to the appropriate depth and ensured to not perforate anterior femoral cortex on lateral imaging.       We then used the outrigger guide and triple sleeve inserted through a lateral incision on the thigh to place a guide pin in appropriate center center position through the femoral neck and head.  We used fluoroscopic imaging to confirm our  pin placement.  This was appropriate tip apex distance.  We confirmed that the tip of the wire did not perforate femoral head.  We then used the we then drilled for a 105-mm femoral neck screw. The appropriate  size screw was then placed and appropriate tip apex distance on AP and lateral views.  Fracture was compressed through the guide.     We turned our attention to placement of distal interlocking screws.  We chose to place 1 distal interlocking screw to provide stability in axial loading and rotation.  1 screw was placed from lateral to medial using percutaneous stab incision and perfect [place] technique. We confirmed appropriate placement of the screws on both AP and lateral fluoroscopic imaging.      Final fluoroscopic imaging of the entire femur was taken in AP and lateral views confirming appropriate hardware placement fracture reduction.     Wounds were thoroughly irrigated with saline. 1g Vancomycin powder was placed deep to the fascia. Fascia was closed with 0 Vicryl.  Subcutaneous tissue closed with 3 O Vicryl. Skin closed with 3 Monocryl. Dermabond applied.  Aquacel dressing applied.     Prior to final closure all counts were confirmed to be correct.  Patient tolerated procedure well, was extubated, transferred to PACU for further recovery.     At the conclusion of the procedure the patient had soft and compressible compartments, brisk cap refill, palpable DP and PT pulse in the operative extremity.     POSTOPERATIVE PLAN:  Sussy Costa is a 88 y.o. female with ground level fall 3/30/24 with a left intertrochanteric hip fracture     S/p - IM nail L hip IT fracture     Antibiotics x 24 hr  eliquis x 4 weeks postop for DVT prophylaxis     Hospitalist comanagement  Multimodal pain management  Calcium, vitamin-D, boost  PT     Fragility fracture Clinic referral     WBAT left lower extremity  Left lower extremity, range of motion as tolerated    Nakul MATT, was present and scrubbed for all critical portions of the procedure

## 2024-03-31 NOTE — ASSESSMENT & PLAN NOTE
Patient had recent asthma exacerbation few weeks ago - uses nebulizers BID - continue this schedule  -Use BREO+ SPIRIVA in place of home TRELEGY  -continue home singulair.

## 2024-03-31 NOTE — NURSING
BP trending down. Last BP reading 90/50. Pt asymptomatic. Dr. Layton notified. Advised per MD to increase blood administration rate and establish second IV for fluid bolus. Blood administration increased to 150 ml/hr. Nurse unable to start second IV. Order placed for midline consult.

## 2024-03-31 NOTE — SUBJECTIVE & OBJECTIVE
Principal Problem:Closed 2-part intertrochanteric fracture of left femur    Principal Orthopedic Problem:  As above    Interval History: NAEON. AF, hypertensive to 197/92, but otherwise hemodynamically stable.  Patient reports that her pain is well-controlled until she moves of the leg.  NPO since midnight.  Verbalizes her understanding of today's surgical plan and agrees to proceed to the OR for insertion of intramedullary nail to the left femur.    Review of patient's allergies indicates:   Allergen Reactions    Aspirin Other (See Comments)     Asthma    TRIAD OF NASAL POLYPS, ASA  ALLERGY AND ASTHMA.        Aspirin Other (See Comments)    Nsaids (non-steroidal anti-inflammatory drug) Other (See Comments)     AVOID DUE TO TRIAD OF ASA ALLERGY, NASAL POLYPS,AND ASTHMA  AVOID DUE TO TRIAD OF ASA ALLERGY, NASAL POLYPS,AND ASTHMA    Penicillin      Other reaction(s): Rash    9/30/20: tolerates ceftriaxone    Penicillin g Other (See Comments)     Other reaction(s): Rash    9/30/20: tolerates ceftriaxone       Current Facility-Administered Medications   Medication    0.9%  NaCl infusion (for blood administration)    0.9%  NaCl infusion    acetaminophen tablet 1,000 mg    albuterol sulfate nebulizer solution 2.5 mg    albuterol sulfate nebulizer solution 2.5 mg    ascorbic acid (vitamin C) tablet 500 mg    atorvastatin tablet 20 mg    bisacodyL suppository 10 mg    cetirizine tablet 5 mg    dextrose 10% bolus 125 mL 125 mL    dextrose 10% bolus 250 mL 250 mL    fluticasone furoate-vilanteroL 200-25 mcg/dose diskus inhaler 1 puff    And    tiotropium bromide 2.5 mcg/actuation inhaler 2 puff    gabapentin capsule 100 mg    glucagon (human recombinant) injection 1 mg    HYDROmorphone injection 0.5 mg    insulin aspart U-100 pen 0-5 Units    insulin aspart U-100 pen 3 Units    insulin detemir U-100 (Levemir) pen 5 Units    latanoprost 0.005 % ophthalmic solution 1 drop    lipase-protease-amylase 24,000-76,000-120,000 units  "capsule 1 capsule    lipase-protease-amylase 24,000-76,000-120,000 units capsule 2 capsule    losartan tablet 50 mg    magnesium oxide tablet 400 mg    magnesium sulfate 2g in water 50mL IVPB (premix)    melatonin tablet 6 mg    methocarbamoL tablet 500 mg    montelukast tablet 10 mg    olopatadine 0.1 % ophthalmic solution 1 drop    ondansetron injection 4 mg    oxyCODONE immediate release tablet 5 mg    sodium chloride 0.9% flush 10 mL    tuberculin injection 5 Units    vitamin D 1000 units tablet 1,000 Units     Objective:     Vital Signs (Most Recent):  Temp: 98.1 °F (36.7 °C) (03/31/24 0423)  Pulse: 82 (03/31/24 0423)  Resp: 18 (03/31/24 0423)  BP: (!) 145/70 (03/31/24 0423)  SpO2: 96 % (03/31/24 0423) Vital Signs (24h Range):  Temp:  [98 °F (36.7 °C)-98.1 °F (36.7 °C)] 98.1 °F (36.7 °C)  Pulse:  [] 82  Resp:  [16-26] 18  SpO2:  [96 %-100 %] 96 %  BP: (139-218)/(64-94) 145/70     Weight: 42 kg (92 lb 9.5 oz)  Height: 5' 5" (165.1 cm)  Body mass index is 15.41 kg/m².      Intake/Output Summary (Last 24 hours) at 3/31/2024 0638  Last data filed at 3/31/2024 0633  Gross per 24 hour   Intake 230.92 ml   Output --   Net 230.92 ml        Ortho/SPM Exam  NAD, resting comfortably in bed  A&O x3   Breathing comfortably w/o distress   Extremities WWP      DP and PT pulses palpated   Skin is intact throughout   TA/EHL/FHL/GSC isolated without deficit  Sensation intact to light touch in the sural, saphenous, DPN, SPN, tibial nerve distributions  Compartments are soft and compressible.          Significant Labs: A1C:   Recent Labs   Lab 01/03/24  1340 03/30/24 2035   HGBA1C 7.9* 7.4*     BMP:   Recent Labs   Lab 03/31/24  0533   *      K 4.1   *   CO2 20*   BUN 34*   CREATININE 1.7*   CALCIUM 8.5*   MG 1.3*     CBC:   Recent Labs   Lab 03/30/24  1936/24  0533   WBC 8.50 12.56   HGB 9.9* 7.7*   HCT 30.4* 23.7*    151     Coagulation:   Recent Labs   Lab 03/30/24 2035 03/30/24  2345 " 03/31/24  0533   LABPROT 68.1* 11.9 12.1   INR 7.2* 1.1 1.1   APTT 56.6*  --   --      All pertinent labs within the past 24 hours have been reviewed.    Significant Imaging: I have reviewed and interpreted all pertinent imaging results/findings.

## 2024-03-31 NOTE — ASSESSMENT & PLAN NOTE
Creatine stable for now. BMP reviewed- noted Estimated Creatinine Clearance: 12.5 mL/min (A) (based on SCr of 2.1 mg/dL (H)). according to latest data. Based on current GFR, CKD stage is stage 3 - GFR 30-59.  Monitor UOP and serial BMP and adjust therapy as needed. Renally dose meds. Avoid nephrotoxic medications and procedures.    Her Creatinine is mildly increased from baseline   Per pathway - will give IV fluids after midnight but reduced rate 50cc/hr x 10 hours

## 2024-03-31 NOTE — ASSESSMENT & PLAN NOTE
-start hip fracture pathway  -npo at midnight   -give dilaudid 0.5mg Iv x1 now - suspsect her hypertension is secondary to lack of pain control, but is improving   -orthopedic surgery consulted - plans for operative intervention tomorrow   Multimodal pain control    Avoid NSAIDs with CKD, patient on gabapentin while hold any Lyrica per pathway

## 2024-03-31 NOTE — PLAN OF CARE
[facility]  Initial Discharge Assessment       Primary Care Provider: PAULA Casillas MD    Admission Diagnosis: Preoperative clearance [Z01.818]  Closed intertrochanteric fracture of hip, left, initial encounter [S72.142A]  Acute kidney injury superimposed on chronic kidney disease [N17.9, N18.9]  Type 2 diabetes mellitus with hyperglycemia, with long-term current use of insulin [E11.65, Z79.4]    Admission Date: 3/30/2024  Expected Discharge Date: 4/3/2024    Transition of Care Barriers: None    Payor: MEDICARE / Plan: MEDICARE PART A & B / Product Type: Government /     Extended Emergency Contact Information  Primary Emergency Contact: Chuck Costa  Address: [address] [place] [address] [id number] [place] [address]  Home Phone: [phone]  Work Phone: [phone]  Mobile Phone: [phone]  Relation: Spouse  Secondary Emergency Contact: Félix Banks  Work Phone: [phone]  Relation: Other  Preferred language: English   needed? No    Discharge Plan A: Rehab  Discharge Plan B: Skilled Nursing Facility      [facility] - [place], [place] - [address] [facility] [address]  [address] [facility] [address]  Phone: [phone] Fax: [phone]    [facility] - [place] Mcmanus - [facility] AT [address] [facility] [address]  Phone: [phone] Fax: [phone]     met with patient at bedside to complete discharge planning assessment.  Patient alert and oriented xs 4.  Patient verified all demographic information on facesheet is correct.  Patient verified PCP is Dr. Casillas.  Patient verified primary health insurance is Medicare and secondary BCBS.  Patient with NO home health but has listed DME.  Patient with NO POA or Living Will.  Patient not on dialysis or medication coumadin.  Patient with no 30 day admission.  Patient with no financial issues at this time.  Patient family will provide  transportation upon discharge from facility.  Patient independent with ADLs, live with spouse, drives self.      Initial Assessment (most recent)       Adult Discharge Assessment - 03/31/24 1641          Discharge Assessment    Assessment Type Discharge Planning Assessment     Confirmed/corrected address, phone number and insurance Yes     Confirmed Demographics Correct on Facesheet     Source of Information patient     Communicated ANTOINE with patient/caregiver Yes     People in Home spouse     Facility Arrived From: home     Do you expect to return to your current living situation? Yes     Do you have help at home or someone to help you manage your care at home? Yes     Who are your caregiver(s) and their phone number(s)? spouse     Prior to hospitilization cognitive status: Alert/Oriented     Current cognitive status: Alert/Oriented     Walking or Climbing Stairs Difficulty no     Dressing/Bathing Difficulty no     Equipment Currently Used at Home nebulizer     Readmission within 30 days? No     Patient currently being followed by outpatient case management? No     Do you currently have service(s) that help you manage your care at home? No     Do you take prescription medications? Yes     Do you have prescription coverage? Yes     Do you have any problems affording any of your prescribed medications? No     Is the patient taking medications as prescribed? yes     Who is going to help you get home at discharge? spouse     How do you get to doctors appointments? car, drives self     Are you on dialysis? No     Do you take coumadin? No     Discharge Plan A Rehab     Discharge Plan B Skilled Nursing Facility     DME Needed Upon Discharge  none     Discharge Plan discussed with: Patient     Transition of Care Barriers None        Physical Activity    On average, how many days per week do you engage in moderate to strenuous exercise (like a brisk walk)? 3 days     On average, how many minutes do you engage in exercise at  this level? 60 min        Financial Resource Strain    How hard is it for you to pay for the very basics like food, housing, medical care, and heating? Not hard at all        Housing Stability    In the last 12 months, was there a time when you were not able to pay the mortgage or rent on time? No     In the last 12 months, was there a time when you did not have a steady place to sleep or slept in a shelter (including now)? No        Transportation Needs    In the past 12 months, has lack of transportation kept you from medical appointments or from getting medications? No     In the past 12 months, has lack of transportation kept you from meetings, work, or from getting things needed for daily living? No        Food Insecurity    Within the past 12 months, you worried that your food would run out before you got the money to buy more. Never true     Within the past 12 months, the food you bought just didn't last and you didn't have money to get more. Never true        Stress    Do you feel stress - tense, restless, nervous, or anxious, or unable to sleep at night because your mind is troubled all the time - these days? Not at all        Social Connections    In a typical week, how many times do you talk on the phone with family, friends, or neighbors? More than three times a week     How often do you get together with friends or relatives? More than three times a week     How often do you attend Christianity or Anglican services? More than 4 times per year     Do you belong to any clubs or organizations such as Christianity groups, unions, fraternal or athletic groups, or school groups? Yes     How often do you attend meetings of the clubs or organizations you belong to? 1 to 4 times per year     Are you , , , , never , or living with a partner?         Alcohol Use    Q1: How often do you have a drink containing alcohol? 2-4 times a month     Q2: How many drinks containing alcohol do  you have on a typical day when you are drinking? 1 or 2     Q3: How often do you have six or more drinks on one occasion? Never

## 2024-03-31 NOTE — ASSESSMENT & PLAN NOTE
Sussy Costa is a 88 y.o. female with a left intertrochanteric femur fracture, closed, NVI. They take no anticoagulation at home. They required no ambulatory assistive devices prior to this injury.     -To OR for IMN left femur this morning  -NPO for surgery today  -AM Hgb 7.7, 2 units of pRBC's held   -DVT PPx: Hold anticoagulation  -Abx: Preop abx ordered  -Labs: Reviewed; Mag 1.3, PT/INR 12.1 and 1.1, respectively, AM Hgb 7.7, Bun/Cr 34 and 1.7 respectively

## 2024-03-31 NOTE — ANESTHESIA POSTPROCEDURE EVALUATION
Anesthesia Post Evaluation    Patient: Sussy Costa    Procedure(s) Performed: Procedure(s) (LRB):  INSERTION, INTRAMEDULLARY CAYDEN, FEMUR - Left, (Left)    Final Anesthesia Type: general      Patient location during evaluation: PACU  Patient participation: Yes- Able to Participate  Level of consciousness: awake and alert  Post-procedure vital signs: reviewed and stable  Pain management: adequate  Airway patency: patent    PONV status at discharge: No PONV  Anesthetic complications: no      Cardiovascular status: blood pressure returned to baseline  Respiratory status: unassisted  Follow-up not needed.              Vitals Value Taken Time   /57 03/31/24 1354   Temp 36.5 °C (97.7 °F) 03/31/24 1354   Pulse 84 03/31/24 1354   Resp 16 03/31/24 1354   SpO2 97 % 03/31/24 1354         Event Time   Out of Recovery 03/31/2024 12:15:00         Pain/Caitie Score: Pain Rating Prior to Med Admin: 6 (3/31/2024  1:20 PM)  Pain Rating Post Med Admin: 5 (3/31/2024 12:45 PM)  Caitie Score: 9 (3/31/2024 12:15 PM)

## 2024-03-31 NOTE — ANESTHESIA PROCEDURE NOTES
Peripheral Block    Patient location during procedure: OR   Block not for primary anesthetic.  Reason for block: at surgeon's request and post-op pain management   Post-op Pain Location: Left hip pain   Start time: 3/31/2024 9:53 AM  Timeout: 3/31/2024 9:51 AM   End time: 3/31/2024 9:59 AM    Staffing  Authorizing Provider: Yola Murillo MD  Performing Provider: Evan Felipe MD    Staffing  Performed by: Evan Felipe MD  Authorized by: Yola Murillo MD    Preanesthetic Checklist  Completed: patient identified, IV checked, site marked, risks and benefits discussed, surgical consent, monitors and equipment checked, pre-op evaluation and timeout performed  Peripheral Block  Patient position: supine  Prep: ChloraPrep and site prepped and draped  Patient monitoring: heart rate, cardiac monitor, continuous pulse ox, continuous capnometry and frequent blood pressure checks  Block type: fascia iliaca  Laterality: left  Injection technique: continuous  Needle  Needle type: Tuohy   Needle gauge: 17 G  Needle length: 3.5 in  Needle localization: anatomical landmarks and ultrasound guidance  Catheter type: spring wound  Catheter size: 19 G  Test dose: lidocaine 1.5% with Epi 1-to-200,000 and negative   -ultrasound image captured on disc.  Assessment  Injection assessment: negative aspiration, negative parasthesia and local visualized surrounding nerve  Paresthesia pain: none  Heart rate change: no  Slow fractionated injection: yes  Pain Tolerance: comfortable throughout block and no complaints  Medications:    Medications: ropivacaine (NAROPIN) injection 0.5% - Perineural   15 mL - 3/31/2024 9:54:00 AM    Additional Notes  VSS.  Anesthesia monitoring vitals throughout procedure.  Patient tolerated procedure well.  30 cc of 0.25% ropivacaine with epi 1:300k administered for the block.

## 2024-03-31 NOTE — ASSESSMENT & PLAN NOTE
Patient with acute kidney injury/acute renal failure likely due to pre-renal azotemia due to IVVD ADARSH is currently improving. Baseline creatinine  1.2 to 1.7  - Labs reviewed- Renal function/electrolytes with Estimated Creatinine Clearance: 15.5 mL/min (A) (based on SCr of 1.7 mg/dL (H)). according to latest data. Monitor urine output and serial BMP and adjust therapy as needed. Avoid nephrotoxins and renally dose meds for GFR listed above.    Improved from 2.1 on admit with hyperglycemia likely also contributing to volume depletion and now at 1.7

## 2024-03-31 NOTE — SUBJECTIVE & OBJECTIVE
Past Medical History:   Diagnosis Date    Asthma     Cyst of pancreas     liver    Diabetes mellitus type II     Eczema of hand     Glaucoma     History of recurrent pneumonia 9/28/2020    Hyperlipidemia     Hypertension     Lichen planus     gums    Osteoporosis     Pulmonary nodules        Past Surgical History:   Procedure Laterality Date    BRONCHOSCOPY N/A 5/13/2022    Procedure: Bronchoscopy;  Surgeon: Tina Diagnostic Provider;  [address];  Service: Anesthesiology;  Laterality: N/A;    CATARACT EXTRACTION, BILATERAL      CHOLECYSTECTOMY      COLECTOMY, RIGHT Right 10/25/2022    Procedure: COLECTOMY, RIGHT;  Surgeon: Jass Holman MD;  [facility];  Service: Colon and Rectal;  Laterality: Right;    COLONOSCOPY N/A 6/26/2023    Procedure: COLONOSCOPY;  Surgeon: Jass Holman MD;  [facility]  Service: Endoscopy;  Laterality: N/A;  2nd floor -lung disease  referral Dr [id number] portal/mailed-GT  6/20/23- [place] confirmed- [place]    ENDOSCOPIC ULTRASOUND OF UPPER GASTROINTESTINAL TRACT N/A 4/22/2022    Procedure: ULTRASOUND, UPPER GI TRACT, ENDOSCOPIC;  Surgeon: Max Rosado MD;  [facility]  Service: Endoscopy;  Laterality: N/A;  urgent EUS (linear) for abnormal CT scan with dilated PD and increased cyst of pancreas cyst.  pap 44 mmHg  4/13:fully vaccinated. instructions via portal-SC    EPIDURAL STEROID INJECTION INTO LUMBAR SPINE N/A 11/8/2018    Procedure: Injection-steroid-epidural-lumbar L5-S1;  Surgeon: Nicci Osorio Jr., MD;  Location: [facility] PAIN MGT;  Service: Pain Management;  Laterality: N/A;    FUNCTIONAL ENDOSCOPIC SINUS SURGERY (FESS) USING COMPUTER-ASSISTED NAVIGATION N/A 6/17/2019    Procedure: FESS, USING COMPUTER-ASSISTED NAVIGATION;  Surgeon: Rosangela Isabel MD;  Location: [facility] OR;  Service: ENT;  Laterality: N/A;  video    HYSTERECTOMY      nasal polyps         Review of patient's allergies  indicates:   Allergen Reactions    Aspirin Other (See Comments)     Asthma    TRIAD OF NASAL POLYPS, ASA  ALLERGY AND ASTHMA.        Aspirin Other (See Comments)    Nsaids (non-steroidal anti-inflammatory drug) Other (See Comments)     AVOID DUE TO TRIAD OF ASA ALLERGY, NASAL POLYPS,AND ASTHMA  AVOID DUE TO TRIAD OF ASA ALLERGY, NASAL POLYPS,AND ASTHMA    Penicillin      Other reaction(s): Rash    20: tolerates ceftriaxone    Penicillin g Other (See Comments)     Other reaction(s): Rash    20: tolerates ceftriaxone       No current facility-administered medications on file prior to encounter.     Current Outpatient Medications on File Prior to Encounter   Medication Sig    ACCU-CHEK GUIDE TEST STRIPS Strp TEST FOUR TIMES DAILY WITH MEALS AND NIGHTLY    albuterol (PROVENTIL) 2.5 mg /3 mL (0.083 %) nebulizer solution Take 3 mLs (2.5 mg total) by nebulization every 6 (six) hours as needed for Wheezing or Shortness of Breath. Rescue    ascorbic acid, vitamin C, (VITAMIN C) 500 MG tablet Take 500 mg by mouth once daily.    atorvastatin (LIPITOR) 20 MG tablet Take 1 tablet (20 mg total) by mouth every evening.    budesonide 1 mg/2 mL NbSp SMARTSI Vial(s) Both Nares Twice Daily    chlorthalidone (HYGROTEN) 25 MG Tab Take 1 tablet (25 mg total) by mouth once daily.    denosumab (PROLIA) 60 mg/mL Syrg Inject 60 mg into the skin every 6 (six) months.    fluticasone-umeclidin-vilanter (TRELEGY ELLIPTA) 200-62.5-25 mcg inhaler Inhale 1 puff into the lungs once daily.    irbesartan (AVAPRO) 300 MG tablet Take 1 tablet (300 mg total) by mouth every evening.    lancets (ACCU-CHEK FASTCLIX LANCET DRUM) [facility]SE FOUR TIMES DAILY    latanoprost 0.005 % ophthalmic solution Inject into the eye. 1 Drops Ophthalmic Every evening    levocetirizine (XYZAL) 5 MG tablet Take 1 tablet (5 mg total) by mouth every evening.    lipase-protease-amylase 24,000-76,000-120,000 units (CREON) 24,000-76,000 -120,000 unit  "capsule Take 2 capsules with meals orally and 1 capsule with snacks.    magnesium oxide (MAG-OX) 400 mg (241.3 mg magnesium) tablet Take 400 mg by mouth once daily.    montelukast (SINGULAIR) 10 mg tablet Take 1 tablet (10 mg total) by mouth every evening.    NEILMED SINUS RINSE REFILL Pack use as directed    olopatadine (PATANOL) 0.1 % ophthalmic solution Place 1 drop into both eyes 2 (two) times daily as needed for Allergies. 1 Drops Ophthalmic Twice a day    vitamin D (VITAMIN D3) 1000 units Tab Take 1,000 Units by mouth once daily.    diclofenac sodium (VOLTAREN) 1 % Gel Apply 2 g topically 4 (four) times daily.    gabapentin (NEURONTIN) 100 MG capsule Take 1 capsule (100 mg total) by mouth 3 (three) times daily. (Patient taking differently: Take 100 mg by mouth 3 (three) times daily as needed.)    insulin aspart U-100 (NOVOLOG FLEXPEN U-100 INSULIN) 100 unit/mL (3 mL) InPn pen Inject 4 Units into the skin before breakfast AND 5 Units daily with lunch AND 4 Units daily with dinner or evening meal. Plus correction scale. Max TDD 30 units..    insulin detemir U-100, Levemir, (LEVEMIR FLEXTOUCH U100 INSULIN) 100 unit/mL (3 mL) InPn pen Inject 6 Units into the skin once daily AND 5 Units every evening. (Patient taking differently: Inject 7 Units into the skin once daily AND 5 Units every evening.)    LIDOcaine HCL 3 % Crea Apply 1 application  topically 2 (two) times daily. Apply to affected area    pen needle, diabetic 31 gauge x 5/16" Ndle Use with insulin pen 5 times a day    [DISCONTINUED] risedronate (ACTONEL) 35 MG tablet Take 1 tablet (35 mg total) by mouth every 7 days.     Family History       Problem Relation (Age of Onset)    Heart disease Father, Brother    Hypertension Brother          Tobacco Use    Smoking status: Never     Passive exposure: Never    Smokeless tobacco: Never   Substance and Sexual Activity    Alcohol use: Yes     Comment: socially    Drug use: No    Sexual activity: Yes     Partners: " Male     Birth control/protection: Post-menopausal     Review of Systems   Constitutional:  Positive for activity change. Negative for appetite change and chills.   HENT: Negative.     Respiratory: Negative.     Cardiovascular: Negative.    Gastrointestinal: Negative.    Genitourinary: Negative.    Musculoskeletal: Negative.    Psychiatric/Behavioral: Negative.       Objective:     Vital Signs (Most Recent):  Temp: 98.1 °F (36.7 °C) (03/30/24 2316)  Pulse: 102 (03/30/24 2323)  Resp: 18 (03/30/24 2316)  BP: (!) 164/70 (03/30/24 2323)  SpO2: 98 % (03/30/24 2316) Vital Signs (24h Range):  Temp:  [98 °F (36.7 °C)-98.1 °F (36.7 °C)] 98.1 °F (36.7 °C)  Pulse:  [] 102  Resp:  [16-26] 18  SpO2:  [97 %-100 %] 98 %  BP: (139-218)/(64-94) 164/70     Weight: 42 kg (92 lb 9.5 oz)  Body mass index is 15.41 kg/m².     Physical Exam  Vitals and nursing note reviewed.   Constitutional:       Appearance: She is not toxic-appearing.      Comments: Thin body habitus   Eyes:      General: No scleral icterus.  Cardiovascular:      Rate and Rhythm: Regular rhythm. Tachycardia present.      Pulses: Normal pulses.   Pulmonary:      Effort: Pulmonary effort is normal. No respiratory distress.      Breath sounds: No wheezing.   Abdominal:      General: Bowel sounds are normal. There is no distension.      Palpations: Abdomen is soft.      Tenderness: There is no abdominal tenderness. There is no guarding.   Musculoskeletal:         General: Tenderness (left hip, pain w/leg movement) present.      Cervical back: Neck supple.      Right lower leg: No edema.      Left lower leg: No edema.   Skin:     General: Skin is warm and dry.      Comments: Abrasions on dorsum of left hand/wrist   Neurological:      General: No focal deficit present.      Mental Status: She is alert and oriented to person, place, and time. Mental status is at baseline.   Psychiatric:         Mood and Affect: Mood normal.         Behavior: Behavior normal.               "  Significant Labs: All pertinent labs within the past 24 hours have been reviewed.  ABGs:   Recent Labs   Lab 03/30/24 2116   PH 7.377   PCO2 26.7*   HCO3 15.6*   POCSATURATED 97   BE -10*   PO2 88*     CBC:   Recent Labs   Lab 03/1936   WBC 8.50   HGB 9.9*   HCT 30.4*        CMP:   Recent Labs   Lab 03/1936      K 4.8   *   CO2 18*   *   BUN 36*   CREATININE 2.1*   CALCIUM 9.4   PROT 6.0   ALBUMIN 3.3*   BILITOT 0.3   ALKPHOS 50*   AST 24   ALT 34   ANIONGAP 9     Cardiac Markers:   Recent Labs   Lab 03/30/24 2143   *     Coagulation:   Recent Labs   Lab 03/30/24 2035   INR 7.2*   APTT 56.6*     Lactic Acid: No results for input(s): "LACTATE" in the last 48 hours.  Magnesium:   Recent Labs   Lab 03/30/24 2035   MG 1.3*     Troponin:   Recent Labs   Lab 03/30/24 2143   TROPONINI 0.077*     Urine Studies:   Recent Labs   Lab 03/30/24 2104   COLORU Colorless*   APPEARANCEUA Clear   PHUR 5.0   SPECGRAV 1.015   PROTEINUA Negative   GLUCUA 4+*   KETONESU Negative   BILIRUBINUA Negative   OCCULTUA Negative   NITRITE Negative   LEUKOCYTESUR Negative   RBCUA 1   WBCUA 0   BACTERIA Rare       Significant Imaging: I have reviewed all pertinent imaging results/findings within the past 24 hours.    XR HIP WITH PELVIS WHEN PERFORMED, 2 OR 3 VIEWS LEFT     CLINICAL HISTORY:  Left hip pain;     TECHNIQUE:  AP view of the pelvis and frog leg lateral view of the left hip were performed.     COMPARISON:  CT abdomen and pelvis 11/18/2023     FINDINGS:  Four views left hip.     The bilateral sacroiliac joints are intact noting degenerative change.  There is osteopenia.  There are degenerative changes of the right femoroacetabular joint.  The pubic symphysis is intact.  There is acute appearing impacted intertrochanteric fracture of the left femur.  There are surgical changes of the left inguinal region.  There is moderate to large amount of stool in the colon.  There is vascular " calcification.     Impression:     1. Intertrochanteric fracture of the left femur as described.        Electronically signed by: Matteo Stallworth MD  Date:                                            03/30/2024  Time:                                           20:17    EXAMINATION:  XR CHEST AP PORTABLE     CLINICAL HISTORY:  PRE-OP;     TECHNIQUE:  Single frontal view of the chest was performed.     COMPARISON:  11/18/2023     FINDINGS:  Cardiac monitoring leads overlie the chest.  The cardiac silhouette is stable in size and configuration.  There is prominent atherosclerotic calcification of the thoracic aorta.  Lungs demonstrate chronic coarse interstitial attenuation and scattered subsegmental opacities, similar to prior examination.  No new large confluent airspace consolidation appreciated.  No significant volume of pleural fluid or pneumothorax identified.  Presumed skin fold overlies the right hemithorax.  There is a chronic appearing right lateral 6th rib deformity.  Osseous structures demonstrate degenerative changes.     Impression:     As above.        Electronically signed by: Washington Rizo MD  Date:                                            03/30/2024  Time:                                           22:37                     XR WRIST COMPLETE 3 VIEWS LEFT     CLINICAL HISTORY:  fall;     TECHNIQUE:  PA, lateral, and oblique views of the left wrist were performed.     COMPARISON:  None     FINDINGS:  There is diffuse osteopenia.  No definite radiographic evidence of acute displaced fracture or dislocation of the left wrist.  There are mild degenerative changes present.  Soft tissues are unremarkable.     Impression:     No radiographic evidence of acute displaced fracture or dislocation of the left wrist.        Electronically signed by: Washington Rizo MD  Date:                                            03/30/2024  Time:                                           22:39

## 2024-03-31 NOTE — PROGRESS NOTES
[facility] Medicine  Progress Note    Patient Name: Sussy Costa  MRN: [id number]  Patient Class: IP- Inpatient   Admission Date: 3/30/2024  Length of Stay: 1 days  Attending Physician: Glenny Layton MD  Primary Care Provider: PAULA Casillas MD        Subjective:     Principal Problem:Closed 2-part intertrochanteric fracture of left femur        HPI:  89 y/o WF hx with Type 2 DM, CKD stage 3, HTN, pancreatic insufficiency, Asthma, Afib presents to the ED s/p fall with left hip pain.  She was at home and putting items in her fridge when she suffered a fall.  She fell onto her left hip, left arm outstretched, she did not hit her head and had no LOC.  She had immediate severe pain and was unable to stand.  EMS contacted and patient transported to the hospital, plain films show left femoral intertrochanteric fracture.   Her  was home but did not witness the fall.   & Son-in-Law present at bedside    She reports a history of left knee weakness/issues, attends outpatient PT @ [facility] for strengthening.  She has had multiple falls in recent history.  She had 10/10 pain on arrival, received Morphine 4mg IV and reports pain 5/10 during interview, her preceding blood pressures have been 190-200 systolic.   Rechecked at bedside and down to 139s.    At baseline patient performs all ADLs, toileting/bathing/clothing/preps food/manages meds/drives.      She has no active chest pain, dyspnea, no hx of CAD or CHF, no hx of stroke.  Her DASI score is 5.27 mets.  No tobacco/drug use, occasional EToh use.   GLucose on presentation 578 - pt reports she may have overate in afternoon, is adherent to home meds, reconciled at [facility]. Unsure of overall code status, remain Full code pending completion of surgery.     ED Meds: Insulin 5units IV x 1, Zofran IVx1, Morphine 4mg Iv x 1    Overview/Hospital Course:  No notes on file    Interval history- seen in room before surgery and receiving  neb treatment. She reports glucose at home never in 500s but she ate desserts yesterday and had some dietary noncompliance yesterday. She does see 300s maybe once or twice a week but also had seen a few 80 glucoses in last week. She denies missing any home insulin recently. She reports fall accidental without any prodrome symptoms. She said BP usually well controlled at home as up to 200 systolic in the ER and suspect pain related as improved overnight to 140s and now 110s in pacu. Glucose still high at high 200s-low 300s this AM and adjusted up to 9 U levemir this morning and suspect likely will need more once eating post op. At home is on 6 in Am and 5 in PM and novolog 4-5 with meals. Will start 6 of novolog now at least and see what glucose is in pacu and potentially go to a higher dose pending what it looks like after levemir this AM> adjusted from low dose sliding scale to moderate dose sliding scale placed on overnight. Has multimodals for pain with ropivicaine placed by anesthesia for this. Bowel regimen added as large stool burden seen in colon on CT abdomen on admit to help promote. Mag low and replaced on admit and hg 7.7. and transfused this morning by ortho in anticipation of surgery. UA neg on admit, vit D 27 and mildly low and replacement started on admission. Cr 2.1 on admit with baseline 1.2 to 1.7 and improving to 1.7 today.  IM nail today with ortho with eliquis to start post op for dvt ppx, end date 4/29. Started on losartan overnight to start today but given ADARSH improving likely okay to continue now. Glucose main issue to get under control now post op.         Review of patient's allergies indicates:   Allergen Reactions    Aspirin Other (See Comments)     Asthma    TRIAD OF NASAL POLYPS, ASA  ALLERGY AND ASTHMA.        Aspirin Other (See Comments)    Nsaids (non-steroidal anti-inflammatory drug) Other (See Comments)     AVOID DUE TO TRIAD OF ASA ALLERGY, NASAL POLYPS,AND ASTHMA  AVOID DUE TO  TRIAD OF ASA ALLERGY, NASAL POLYPS,AND ASTHMA    Penicillin      Other reaction(s): Rash    20: tolerates ceftriaxone    Penicillin g Other (See Comments)     Other reaction(s): Rash    20: tolerates ceftriaxone       No current facility-administered medications on file prior to encounter.     Current Outpatient Medications on File Prior to Encounter   Medication Sig    ACCU-CHEK GUIDE TEST STRIPS Strp TEST FOUR TIMES DAILY WITH MEALS AND NIGHTLY    albuterol (PROVENTIL) 2.5 mg /3 mL (0.083 %) nebulizer solution Take 3 mLs (2.5 mg total) by nebulization every 6 (six) hours as needed for Wheezing or Shortness of Breath. Rescue    ascorbic acid, vitamin C, (VITAMIN C) 500 MG tablet Take 500 mg by mouth once daily.    atorvastatin (LIPITOR) 20 MG tablet Take 1 tablet (20 mg total) by mouth every evening.    budesonide 1 mg/2 mL NbSp SMARTSI Vial(s) Both Nares Twice Daily    chlorthalidone (HYGROTEN) 25 MG Tab Take 1 tablet (25 mg total) by mouth once daily.    denosumab (PROLIA) 60 mg/mL Syrg Inject 60 mg into the skin every 6 (six) months.    fluticasone-umeclidin-vilanter (TRELEGY ELLIPTA) 200-62.5-25 mcg inhaler Inhale 1 puff into the lungs once daily.    irbesartan (AVAPRO) 300 MG tablet Take 1 tablet (300 mg total) by mouth every evening.    lancets (ACCU-CHEK FASTCLIX LANCET DRUM) [facility] [place] CHECK BLOOD GLUCOSE FOUR TIMES DAILY    latanoprost 0.005 % ophthalmic solution Inject into the eye. 1 Drops Ophthalmic Every evening    levocetirizine (XYZAL) 5 MG tablet Take 1 tablet (5 mg total) by mouth every evening.    lipase-protease-amylase 24,000-76,000-120,000 units (CREON) 24,000-76,000 -120,000 unit capsule Take 2 capsules with meals orally and 1 capsule with snacks.    magnesium oxide (MAG-OX) 400 mg (241.3 mg magnesium) tablet Take 400 mg by mouth once daily.    montelukast (SINGULAIR) 10 mg tablet Take 1 tablet (10 mg total) by mouth every evening.    NEILMED SINUS RINSE REFILL Pack use as directed     "olopatadine (PATANOL) 0.1 % ophthalmic solution Place 1 drop into both eyes 2 (two) times daily as needed for Allergies. 1 Drops Ophthalmic Twice a day    vitamin D (VITAMIN D3) 1000 units Tab Take 1,000 Units by mouth once daily.    diclofenac sodium (VOLTAREN) 1 % Gel Apply 2 g topically 4 (four) times daily.    gabapentin (NEURONTIN) 100 MG capsule Take 1 capsule (100 mg total) by mouth 3 (three) times daily. (Patient taking differently: Take 100 mg by mouth 3 (three) times daily as needed.)    insulin aspart U-100 (NOVOLOG FLEXPEN U-100 INSULIN) 100 unit/mL (3 mL) InPn pen Inject 4 Units into the skin before breakfast AND 5 Units daily with lunch AND 4 Units daily with dinner or evening meal. Plus correction scale. Max TDD 30 units..    insulin detemir U-100, Levemir, (LEVEMIR FLEXTOUCH U100 INSULIN) 100 unit/mL (3 mL) InPn pen Inject 6 Units into the skin once daily AND 5 Units every evening. (Patient taking differently: Inject 7 Units into the skin once daily AND 5 Units every evening.)    LIDOcaine HCL 3 % Crea Apply 1 application  topically 2 (two) times daily. Apply to affected area    pen needle, diabetic 31 gauge x 5/16" Ndle Use with insulin pen 5 times a day    [DISCONTINUED] risedronate (ACTONEL) 35 MG tablet Take 1 tablet (35 mg total) by mouth every 7 days.     Family History       Problem Relation (Age of Onset)    Heart disease Father, Brother    Hypertension Brother          Tobacco Use    Smoking status: Never     Passive exposure: Never    Smokeless tobacco: Never   Substance and Sexual Activity    Alcohol use: Yes     Comment: socially    Drug use: No    Sexual activity: Yes     Partners: Male     Birth control/protection: Post-menopausal     Review of Systems   Constitutional:  Positive for activity change. Negative for appetite change and chills.   HENT: Negative.     Respiratory: Negative.     Cardiovascular: Negative.    Gastrointestinal: Negative.    Genitourinary: Negative.  "   Musculoskeletal: Negative.    Psychiatric/Behavioral: Negative.       Objective:     Vital Signs (Most Recent):  Temp: 98 °F (36.7 °C) (03/31/24 0742)  Pulse: 83 (03/31/24 0742)  Resp: (!) 22 (03/31/24 1125)  BP: (!) 112/59 (03/31/24 0742)  SpO2: 100 % (03/31/24 0742) Vital Signs (24h Range):  Temp:  [98 °F (36.7 °C)-98.1 °F (36.7 °C)] 98 °F (36.7 °C)  Pulse:  [] 83  Resp:  [16-26] 22  SpO2:  [96 %-100 %] 100 %  BP: (112-218)/(59-94) 112/59     Weight: 42 kg (92 lb 9.5 oz)  Body mass index is 15.41 kg/m².     Physical Exam  Vitals and nursing note reviewed.   Constitutional:       Appearance: She is not toxic-appearing.   Eyes:      General: No scleral icterus.  Cardiovascular:      Rate and Rhythm: Regular rhythm.      Pulses: Normal pulses.   Pulmonary:      Effort: Pulmonary effort is normal. No respiratory distress.      Breath sounds: No wheezing.   Abdominal:      General: Bowel sounds are normal. There is no distension.      Palpations: Abdomen is soft.      Tenderness: There is no abdominal tenderness. There is no guarding.   Musculoskeletal:         General: Tenderness (left hip, pain w/leg movement) present.      Cervical back: Neck supple.      Right lower leg: No edema.      Left lower leg: No edema.   Skin:     General: Skin is warm and dry.      Comments: Abrasions on dorsum of left hand/wrist   Neurological:      General: No focal deficit present.      Mental Status: She is alert and oriented to person, place, and time. Mental status is at baseline.   Psychiatric:         Mood and Affect: Mood normal.         Behavior: Behavior normal.                Significant Labs: All pertinent labs within the past 24 hours have been reviewed.  ABGs:   Recent Labs   Lab 03/30/24 2116   PH 7.377   PCO2 26.7*   HCO3 15.6*   POCSATURATED 97   BE -10*   PO2 88*       CBC:   Recent Labs   Lab 03/30/24  1936/24  0533   WBC 8.50 12.56   HGB 9.9* 7.7*   HCT 30.4* 23.7*    151       CMP:   Recent Labs  "  Lab 03/1936 03/31/24  0533    140   K 4.8 4.1   * 113*   CO2 18* 20*   * 303*   BUN 36* 34*   CREATININE 2.1* 1.7*   CALCIUM 9.4 8.5*   PROT 6.0 4.8*   ALBUMIN 3.3* 2.8*   BILITOT 0.3 0.4   ALKPHOS 50* 47*   AST 24 36   ALT 34 46*   ANIONGAP 9 7*       Cardiac Markers:   Recent Labs   Lab 03/30/24 2143   *       Coagulation:   Recent Labs   Lab 03/30/24 2035 03/30/24 2345 03/31/24  0533   INR 7.2*   < > 1.1   APTT 56.6*  --   --     < > = values in this interval not displayed.       Lactic Acid: No results for input(s): "LACTATE" in the last 48 hours.  Magnesium:   Recent Labs   Lab 03/30/24 2035 03/31/24  0533   MG 1.3* 1.3*       Troponin:   Recent Labs   Lab 03/30/24 2143 03/31/24  0533   TROPONINI 0.077* 0.021       Urine Studies:   Recent Labs   Lab 03/30/24 2104   COLORU Colorless*   APPEARANCEUA Clear   PHUR 5.0   SPECGRAV 1.015   PROTEINUA Negative   GLUCUA 4+*   KETONESU Negative   BILIRUBINUA Negative   OCCULTUA Negative   NITRITE Negative   LEUKOCYTESUR Negative   RBCUA 1   WBCUA 0   BACTERIA Rare         Significant Imaging: I have reviewed all pertinent imaging results/findings within the past 24 hours.    XR HIP WITH PELVIS WHEN PERFORMED, 2 OR 3 VIEWS LEFT     CLINICAL HISTORY:  Left hip pain;     TECHNIQUE:  AP view of the pelvis and frog leg lateral view of the left hip were performed.     COMPARISON:  CT abdomen and pelvis 11/18/2023     FINDINGS:  Four views left hip.     The bilateral sacroiliac joints are intact noting degenerative change.  There is osteopenia.  There are degenerative changes of the right femoroacetabular joint.  The pubic symphysis is intact.  There is acute appearing impacted intertrochanteric fracture of the left femur.  There are surgical changes of the left inguinal region.  There is moderate to large amount of stool in the colon.  There is vascular calcification.     Impression:     1. Intertrochanteric fracture of the left femur as " described.        Electronically signed by: Matteo Stallworth MD  Date:                                            03/30/2024  Time:                                           20:17    EXAMINATION:  XR CHEST AP PORTABLE     CLINICAL HISTORY:  PRE-OP;     TECHNIQUE:  Single frontal view of the chest was performed.     COMPARISON:  11/18/2023     FINDINGS:  Cardiac monitoring leads overlie the chest.  The cardiac silhouette is stable in size and configuration.  There is prominent atherosclerotic calcification of the thoracic aorta.  Lungs demonstrate chronic coarse interstitial attenuation and scattered subsegmental opacities, similar to prior examination.  No new large confluent airspace consolidation appreciated.  No significant volume of pleural fluid or pneumothorax identified.  Presumed skin fold overlies the right hemithorax.  There is a chronic appearing right lateral 6th rib deformity.  Osseous structures demonstrate degenerative changes.     Impression:     As above.        Electronically signed by: Washington Rizo MD  Date:                                            03/30/2024  Time:                                           22:37                     XR WRIST COMPLETE 3 VIEWS LEFT     CLINICAL HISTORY:  fall;     TECHNIQUE:  PA, lateral, and oblique views of the left wrist were performed.     COMPARISON:  None     FINDINGS:  There is diffuse osteopenia.  No definite radiographic evidence of acute displaced fracture or dislocation of the left wrist.  There are mild degenerative changes present.  Soft tissues are unremarkable.     Impression:     No radiographic evidence of acute displaced fracture or dislocation of the left wrist.        Electronically signed by: Washington Rizo MD  Date:                                            03/30/2024  Time:                                           22:39          Assessment/Plan:      * Closed 2-part intertrochanteric fracture of left femur  -sustained in mechanical fall  and IT fx found on admit.  Ortho with OR 3/31 with dr garcia with IM nail with WBAT post op with PT/OT on POD 1  -multimodals for pain control with anesthesia managing with ropivicaine placed  -bowel regimen with large stool burden seen on CT on admit  -vit D mildly low at 27 and replacement started  -ca out on POD 1  -eliquis for 35 days post op for dvt ppx with end date 4/29    ADARSH (acute kidney injury)  Patient with acute kidney injury/acute renal failure likely due to pre-renal azotemia due to IVVD ADARSH is currently improving. Baseline creatinine  1.2 to 1.7  - Labs reviewed- Renal function/electrolytes with Estimated Creatinine Clearance: 15.5 mL/min (A) (based on SCr of 1.7 mg/dL (H)). according to latest data. Monitor urine output and serial BMP and adjust therapy as needed. Avoid nephrotoxins and renally dose meds for GFR listed above.    Improved from 2.1 on admit with hyperglycemia likely also contributing to volume depletion and now at 1.7    Constipation  Stool burden large seen on CT pelvis on admit and bowel regimen started. If no BM in 24 hours of admit likely will add more aggressive regimen early      Vitamin D insufficiency  Mildly low at 27 and replacement started      Acute blood loss anemia  Patient's anemia is currently controlled. Has received 1 units of PRBCs on 3/31 . Etiology likely d/t acute blood loss which was from surgical losses and inflammation from fracture with baseline anemia at 9.9  Current CBC reviewed-   Lab Results   Component Value Date    HGB 7.7 (L) 03/31/2024    HCT 23.7 (L) 03/31/2024     Monitor serial CBC and transfuse if patient becomes hemodynamically unstable, symptomatic or H/H drops below 7/21.    Hypomagnesemia  Patient has Abnormal Magnesium: hypomagnesemia. Will continue to monitor electrolytes closely. Will replace the affected electrolytes and repeat labs to be done after interventions completed. The patient's magnesium results have been reviewed and are  listed below.  Recent Labs   Lab 03/31/24  0533   MG 1.3*        Stage 3 chronic kidney disease  Creatine stable for now. BMP reviewed- noted Estimated Creatinine Clearance: 15.5 mL/min (A) (based on SCr of 1.7 mg/dL (H)). according to latest data. Based on current GFR, CKD stage is stage 3 - GFR 30-59.  Monitor UOP and serial BMP and adjust therapy as needed. Renally dose meds. Avoid nephrotoxic medications and procedures.  Baseline Cr 1.2 to 1.7 with Fahad with Cr 2. 1 on admit. Given IVF on admit with improving Cr now to 1.7 back within baseline ranges  -on ARB at home, cont lower dose here now that cr improving of losartan given hers not on formulary    Paroxysmal atrial fibrillation  Patient with Paroxysmal (<7 days) atrial fibrillation which is controlled currently with  on no direct rate or rhythm control . Patient is currently in sinus rhythm.[id number] Score: 4. . Anticoagulation not indicated due to not on as outpatient, not indicated to start now .for this reason but is indicated for dvt ppx post op for 35 days for post hip fx    Carcinoid bronchial adenoma, right  Per overview an indolent incidental finding that is being observed with surveillance as an outpatient, on no active treatment.       Pancreatic insufficiency  -cont home dosage of Creon 120, @ 2 tabs with meals and 1 prn with snacks      Type 2 diabetes mellitus with microalbuminuria, without long-term current use of insulin  She presents with uncontrolled hyperglycemia, no anion gap VBG demonstrates stable PH no ketonemia.  Her serum osmolality is 336, no altered mental status.  And based on calculating osmolality, gap is 14 within normal range.  no HHS at this time no other acute symptoms of hyperglycemic crisis.  -given 6 u of levemir on admit with glucose still 300s on 3/31 AM. Will adjust to 9 U pre op and titrate further post op in PM. Will f/u post op glucose but do at least 6 U of novolog with meals possibly more pending trend. Anesthesia  asked to avoid steroids intra-op for intubation and appears did not receive.  -reports at home has some lows to 80s and some highs to 300s but never 500s  -A1c 7.4  -DM diet        Asthma, moderate persistent  Patient had recent asthma exacerbation few weeks ago - uses nebulizers BID - continue this schedule  -Use BREO+ SPIRIVA in place of home TRELEGY  -continue home singulair.       Senile osteoporosis  Vit D mildly low at 27 and replacement started  -associated with fx related osteoperosis  -resume home dosage  -patient on PROLIA every 6 months - last dose in Feb 2024      Primary hypertension  Chronic, controlled. Latest blood pressure and vitals reviewed-   Home meds for hypertension were reviewed and noted below.   Hypertension Medications               chlorthalidone (HYGROTEN) 25 MG Tab Take 1 tablet (25 mg total) by mouth once daily.    irbesartan (AVAPRO) 300 MG tablet Take 1 tablet (300 mg total) by mouth every evening.            While in the hospital, will manage blood pressure as follows; Adjust home antihypertensive regimen as follows- Cr improving so okay to keep on losartan 50 to start 3/31 (  equivalent of home dose irbesartan is losartan 100mg,) titrate as needed. Hold chlorthalidone for now given improving biju      VTE Risk Mitigation (From admission, onward)           Ordered     apixaban tablet 2.5 mg  2 times daily         03/31/24 1112     IP VTE HIGH RISK PATIENT  Once         03/31/24 0024     Place sequential compression device  Until discontinued         03/31/24 0024                    Discharge Planning   ANTOINE: 4/3/2024     Code Status: Full Code   Is the patient medically ready for discharge?: No    Reason for patient still in hospital (select all that apply): Patient trending condition                     Glenny Layton MD  Department of Hospital Medicine   [facility] - Surgery

## 2024-03-31 NOTE — SUBJECTIVE & OBJECTIVE
Interval history- seen in room before surgery and receiving neb treatment. She reports glucose at home never in 500s but she ate desserts yesterday and had some dietary noncompliance yesterday. She does see 300s maybe once or twice a week but also had seen a few 80 glucoses in last week. She denies missing any home insulin recently. She reports fall accidental without any prodrome symptoms. She said BP usually well controlled at home as up to 200 systolic in the ER and suspect pain related as improved overnight to 140s and now 110s in pacu. Glucose still high at high 200s-low 300s this AM and adjusted up to 9 U levemir this morning and suspect likely will need more once eating post op. At home is on 6 in Am and 5 in PM and novolog 4-5 with meals. Will start 6 of novolog now at least and see what glucose is in pacu and potentially go to a higher dose pending what it looks like after levemir this AM> adjusted from low dose sliding scale to moderate dose sliding scale placed on overnight. Has multimodals for pain with ropivicaine placed by anesthesia for this. Bowel regimen added as large stool burden seen in colon on CT abdomen on admit to help promote. Mag low and replaced on admit and hg 7.7. and transfused this morning by ortho in anticipation of surgery. UA neg on admit, vit D 27 and mildly low and replacement started on admission. Cr 2.1 on admit with baseline 1.2 to 1.7 and improving to 1.7 today.  IM nail today with ortho with eliquis to start post op for dvt ppx, end date 4/29. Started on losartan overnight to start today but given ADARSH improving likely okay to continue now. Glucose main issue to get under control now post op.         Review of patient's allergies indicates:   Allergen Reactions    Aspirin Other (See Comments)     Asthma    TRIAD OF NASAL POLYPS, ASA  ALLERGY AND ASTHMA.        Aspirin Other (See Comments)    Nsaids (non-steroidal anti-inflammatory drug) Other (See Comments)     AVOID DUE TO  TRIAD OF ASA ALLERGY, NASAL POLYPS,AND ASTHMA  AVOID DUE TO TRIAD OF ASA ALLERGY, NASAL POLYPS,AND ASTHMA    Penicillin      Other reaction(s): Rash    20: tolerates ceftriaxone    Penicillin g Other (See Comments)     Other reaction(s): Rash    20: tolerates ceftriaxone       No current facility-administered medications on file prior to encounter.     Current Outpatient Medications on File Prior to Encounter   Medication Sig    ACCU-CHEK GUIDE TEST STRIPS Strp TEST FOUR TIMES DAILY WITH MEALS AND NIGHTLY    albuterol (PROVENTIL) 2.5 mg /3 mL (0.083 %) nebulizer solution Take 3 mLs (2.5 mg total) by nebulization every 6 (six) hours as needed for Wheezing or Shortness of Breath. Rescue    ascorbic acid, vitamin C, (VITAMIN C) 500 MG tablet Take 500 mg by mouth once daily.    atorvastatin (LIPITOR) 20 MG tablet Take 1 tablet (20 mg total) by mouth every evening.    budesonide 1 mg/2 mL NbSp SMARTSI Vial(s) Both Nares Twice Daily    chlorthalidone (HYGROTEN) 25 MG Tab Take 1 tablet (25 mg total) by mouth once daily.    denosumab (PROLIA) 60 mg/mL Syrg Inject 60 mg into the skin every 6 (six) months.    fluticasone-umeclidin-vilanter (TRELEGY ELLIPTA) 200-62.5-25 mcg inhaler Inhale 1 puff into the lungs once daily.    irbesartan (AVAPRO) 300 MG tablet Take 1 tablet (300 mg total) by mouth every evening.    lancets (ACCU-CHEK FASTCLIX LANCET DRUM) [facility] [place]SE FOUR TIMES DAILY    latanoprost 0.005 % ophthalmic solution Inject into the eye. 1 Drops Ophthalmic Every evening    levocetirizine (XYZAL) 5 MG tablet Take 1 tablet (5 mg total) by mouth every evening.    lipase-protease-amylase 24,000-76,000-120,000 units (CREON) 24,000-76,000 -120,000 unit capsule Take 2 capsules with meals orally and 1 capsule with snacks.    magnesium oxide (MAG-OX) 400 mg (241.3 mg magnesium) tablet Take 400 mg by mouth once daily.    montelukast (SINGULAIR) 10 mg tablet Take 1 tablet (10 mg total) by mouth every  "evening.    NEILMED SINUS RINSE REFILL Pack use as directed    olopatadine (PATANOL) 0.1 % ophthalmic solution Place 1 drop into both eyes 2 (two) times daily as needed for Allergies. 1 Drops Ophthalmic Twice a day    vitamin D (VITAMIN D3) 1000 units Tab Take 1,000 Units by mouth once daily.    diclofenac sodium (VOLTAREN) 1 % Gel Apply 2 g topically 4 (four) times daily.    gabapentin (NEURONTIN) 100 MG capsule Take 1 capsule (100 mg total) by mouth 3 (three) times daily. (Patient taking differently: Take 100 mg by mouth 3 (three) times daily as needed.)    insulin aspart U-100 (NOVOLOG FLEXPEN U-100 INSULIN) 100 unit/mL (3 mL) InPn pen Inject 4 Units into the skin before breakfast AND 5 Units daily with lunch AND 4 Units daily with dinner or evening meal. Plus correction scale. Max TDD 30 units..    insulin detemir U-100, Levemir, (LEVEMIR FLEXTOUCH U100 INSULIN) 100 unit/mL (3 mL) InPn pen Inject 6 Units into the skin once daily AND 5 Units every evening. (Patient taking differently: Inject 7 Units into the skin once daily AND 5 Units every evening.)    LIDOcaine HCL 3 % Crea Apply 1 application  topically 2 (two) times daily. Apply to affected area    pen needle, diabetic 31 gauge x 5/16" Ndle Use with insulin pen 5 times a day    [DISCONTINUED] risedronate (ACTONEL) 35 MG tablet Take 1 tablet (35 mg total) by mouth every 7 days.     Family History       Problem Relation (Age of Onset)    Heart disease Father, Brother    Hypertension Brother          Tobacco Use    Smoking status: Never     Passive exposure: Never    Smokeless tobacco: Never   Substance and Sexual Activity    Alcohol use: Yes     Comment: socially    Drug use: No    Sexual activity: Yes     Partners: Male     Birth control/protection: Post-menopausal     Review of Systems   Constitutional:  Positive for activity change. Negative for appetite change and chills.   HENT: Negative.     Respiratory: Negative.     Cardiovascular: Negative.  "   Gastrointestinal: Negative.    Genitourinary: Negative.    Musculoskeletal: Negative.    Psychiatric/Behavioral: Negative.       Objective:     Vital Signs (Most Recent):  Temp: 98 °F (36.7 °C) (03/31/24 0742)  Pulse: 83 (03/31/24 0742)  Resp: (!) 22 (03/31/24 1125)  BP: (!) 112/59 (03/31/24 0742)  SpO2: 100 % (03/31/24 0742) Vital Signs (24h Range):  Temp:  [98 °F (36.7 °C)-98.1 °F (36.7 °C)] 98 °F (36.7 °C)  Pulse:  [] 83  Resp:  [16-26] 22  SpO2:  [96 %-100 %] 100 %  BP: (112-218)/(59-94) 112/59     Weight: 42 kg (92 lb 9.5 oz)  Body mass index is 15.41 kg/m².     Physical Exam  Vitals and nursing note reviewed.   Constitutional:       Appearance: She is not toxic-appearing.   Eyes:      General: No scleral icterus.  Cardiovascular:      Rate and Rhythm: Regular rhythm.      Pulses: Normal pulses.   Pulmonary:      Effort: Pulmonary effort is normal. No respiratory distress.      Breath sounds: No wheezing.   Abdominal:      General: Bowel sounds are normal. There is no distension.      Palpations: Abdomen is soft.      Tenderness: There is no abdominal tenderness. There is no guarding.   Musculoskeletal:         General: Tenderness (left hip, pain w/leg movement) present.      Cervical back: Neck supple.      Right lower leg: No edema.      Left lower leg: No edema.   Skin:     General: Skin is warm and dry.      Comments: Abrasions on dorsum of left hand/wrist   Neurological:      General: No focal deficit present.      Mental Status: She is alert and oriented to person, place, and time. Mental status is at baseline.   Psychiatric:         Mood and Affect: Mood normal.         Behavior: Behavior normal.                Significant Labs: All pertinent labs within the past 24 hours have been reviewed.  ABGs:   Recent Labs   Lab 03/30/24 2116   PH 7.377   PCO2 26.7*   HCO3 15.6*   POCSATURATED 97   BE -10*   PO2 88*       CBC:   Recent Labs   Lab 03/30/24  1936/24  0533   WBC 8.50 12.56   HGB 9.9*  "7.7*   HCT 30.4* 23.7*    151       CMP:   Recent Labs   Lab 03/30/24  1936/24  0533    140   K 4.8 4.1   * 113*   CO2 18* 20*   * 303*   BUN 36* 34*   CREATININE 2.1* 1.7*   CALCIUM 9.4 8.5*   PROT 6.0 4.8*   ALBUMIN 3.3* 2.8*   BILITOT 0.3 0.4   ALKPHOS 50* 47*   AST 24 36   ALT 34 46*   ANIONGAP 9 7*       Cardiac Markers:   Recent Labs   Lab 03/30/24 2143   *       Coagulation:   Recent Labs   Lab 03/30/24 2035 03/30/24 2345 03/31/24  0533   INR 7.2*   < > 1.1   APTT 56.6*  --   --     < > = values in this interval not displayed.       Lactic Acid: No results for input(s): "LACTATE" in the last 48 hours.  Magnesium:   Recent Labs   Lab 03/30/24 2035 03/31/24  0533   MG 1.3* 1.3*       Troponin:   Recent Labs   Lab 03/30/24 2143 03/31/24  0533   TROPONINI 0.077* 0.021       Urine Studies:   Recent Labs   Lab 03/30/24 2104   COLORU Colorless*   APPEARANCEUA Clear   PHUR 5.0   SPECGRAV 1.015   PROTEINUA Negative   GLUCUA 4+*   KETONESU Negative   BILIRUBINUA Negative   OCCULTUA Negative   NITRITE Negative   LEUKOCYTESUR Negative   RBCUA 1   WBCUA 0   BACTERIA Rare         Significant Imaging: I have reviewed all pertinent imaging results/findings within the past 24 hours.    XR HIP WITH PELVIS WHEN PERFORMED, 2 OR 3 VIEWS LEFT     CLINICAL HISTORY:  Left hip pain;     TECHNIQUE:  AP view of the pelvis and frog leg lateral view of the left hip were performed.     COMPARISON:  CT abdomen and pelvis 11/18/2023     FINDINGS:  Four views left hip.     The bilateral sacroiliac joints are intact noting degenerative change.  There is osteopenia.  There are degenerative changes of the right femoroacetabular joint.  The pubic symphysis is intact.  There is acute appearing impacted intertrochanteric fracture of the left femur.  There are surgical changes of the left inguinal region.  There is moderate to large amount of stool in the colon.  There is vascular calcification.   "   Impression:     1. Intertrochanteric fracture of the left femur as described.        Electronically signed by: Matteo Stallworth MD  Date:                                            03/30/2024  Time:                                           20:17    EXAMINATION:  XR CHEST AP PORTABLE     CLINICAL HISTORY:  PRE-OP;     TECHNIQUE:  Single frontal view of the chest was performed.     COMPARISON:  11/18/2023     FINDINGS:  Cardiac monitoring leads overlie the chest.  The cardiac silhouette is stable in size and configuration.  There is prominent atherosclerotic calcification of the thoracic aorta.  Lungs demonstrate chronic coarse interstitial attenuation and scattered subsegmental opacities, similar to prior examination.  No new large confluent airspace consolidation appreciated.  No significant volume of pleural fluid or pneumothorax identified.  Presumed skin fold overlies the right hemithorax.  There is a chronic appearing right lateral 6th rib deformity.  Osseous structures demonstrate degenerative changes.     Impression:     As above.        Electronically signed by: Washington Rizo MD  Date:                                            03/30/2024  Time:                                           22:37                     XR WRIST COMPLETE 3 VIEWS LEFT     CLINICAL HISTORY:  fall;     TECHNIQUE:  PA, lateral, and oblique views of the left wrist were performed.     COMPARISON:  None     FINDINGS:  There is diffuse osteopenia.  No definite radiographic evidence of acute displaced fracture or dislocation of the left wrist.  There are mild degenerative changes present.  Soft tissues are unremarkable.     Impression:     No radiographic evidence of acute displaced fracture or dislocation of the left wrist.        Electronically signed by: Washington Rizo MD  Date:                                            03/30/2024  Time:                                           22:39

## 2024-03-31 NOTE — AI DETERIORATION ALERT
"RAPID RESPONSE NURSE AI ALERT       AI alert received.    Chart Reviewed: 03/30/2024, 11:11 PM    MRN: [id number]  Bed: 507/507 A    Dx: Closed 2-part intertrochanteric fracture of left femur    Sussy Costa has a past medical history of Asthma, Cyst of pancreas, Diabetes mellitus type II, Eczema of hand, Glaucoma, History of recurrent pneumonia, Hyperlipidemia, Hypertension, Lichen planus, Osteoporosis, and Pulmonary nodules.    Last VS: /64   Pulse 99   Temp 98 °F (36.7 °C) (Oral)   Resp (!) 26   Ht 5' 5" (1.651 m)   Wt 49 kg (108 lb)   SpO2 100%   BMI 17.97 kg/m²     24H Vital Sign Range:  Temp:  [98 °F (36.7 °C)]   Pulse:  [87-99]   Resp:  [16-26]   BP: (139-218)/(64-94)   SpO2:  [97 %-100 %]     Level of Consciousness (AVPU): alert    Recent Labs     03/1936   WBC 8.50   HGB 9.9*   HCT 30.4*          Recent Labs     03/1936 03/30/24  2035     --    K 4.8  --    *  --    CO2 18*  --    BUN 36*  --    CREATININE 2.1*  --    *  --    PHOS  --  3.1   MG  --  1.3*        Recent Labs     03/30/24  2116   PH 7.377   PCO2 26.7*   PO2 88*   HCO3 15.6*   POCSATURATED 97   BE -10*          MEWS score:      ED GINA Gloria  contacted. No concerns verbalized at this time. Instructed to call [phone] for further concerns or assistance.    Brian Pineda RN        "

## 2024-03-31 NOTE — H&P
[facility] Medicine  History & Physical    Patient Name: Sussy Costa  MRN: [id number]  Patient Class: IP- Inpatient  Admission Date: 3/30/2024  Attending Physician: Glenny Layton MD [facility] MED H  Admitting Physician: Deep Blevins MD  Primary Care Provider: PAULA Casillas MD    Patient information was obtained from patient, spouse/SO, past medical records, and ER records.     Subjective:     Principal Problem:Closed 2-part intertrochanteric fracture of left femur    Chief Complaint:   Chief Complaint   Patient presents with    Fall     Pt. Had a trip and fall falling to the left side and left hip.  Non weight bearing, unwilling to straighten left leg.   mg/dL. Did not hit head, no blood thinners on board.        HPI: 87 y/o WF hx with Type 2 DM, CKD stage 3, HTN, pancreatic insufficiency, Asthma, Afib presents to the ED s/p fall with left hip pain.  She was at home and putting items in her fridge when she suffered a fall.  She fell onto her left hip, left arm outstretched, she did not hit her head and had no LOC.  She had immediate severe pain and was unable to stand.  EMS contacted and patient transported to the hospital, plain films show left femoral intertrochanteric fracture.   Her  was home but did not witness the fall.   & Son-in-Law present at bedside    She reports a history of left knee weakness/issues, attends outpatient PT @ [facility] for strengthening.  She has had multiple falls in recent history.  She had 10/10 pain on arrival, received Morphine 4mg IV and reports pain 5/10 during interview, her preceding blood pressures have been 190-200 systolic.   Rechecked at bedside and down to 139s.    At baseline patient performs all ADLs, toileting/bathing/clothing/preps food/manages meds/drives.      She has no active chest pain, dyspnea, no hx of CAD or CHF, no hx of stroke.  Her DASI score is 5.27 mets.  No tobacco/drug use, occasional EToh use.    GLucose on presentation 578 - pt reports she may have overate in afternoon, is adherent to home meds, reconciled at [facility]. Unsure of overall code status, remain Full code pending completion of surgery.     ED Meds: Insulin 5units IV x 1, Zofran IVx1, Morphine 4mg Iv x 1    Past Medical History:   Diagnosis Date    Asthma     Cyst of pancreas     liver    Diabetes mellitus type II     Eczema of hand     Glaucoma     History of recurrent pneumonia 9/28/2020    Hyperlipidemia     Hypertension     Lichen planus     gums    Osteoporosis     Pulmonary nodules        Past Surgical History:   Procedure Laterality Date    BRONCHOSCOPY N/A 5/13/2022    Procedure: Bronchoscopy;  Surgeon: [facility];  Location: [facility] OR [address] [place] [address];  Service: Anesthesiology;  Laterality: N/A;    CATARACT EXTRACTION, BILATERAL      CHOLECYSTECTOMY      COLECTOMY, RIGHT Right 10/25/2022    Procedure: COLECTOMY, RIGHT;  Surgeon: Jass Holman MD;  Location: [facility] OR [address] [place] [address];  Service: Colon and Rectal;  Laterality: Right;    COLONOSCOPY N/A 6/26/2023    Procedure: COLONOSCOPY;  Surgeon: Jass Holman MD;  Location: [facility] ([address] [place] [address]);  Service: Endoscopy;  Laterality: N/A;  2nd floor -lung disease  referral Dr [id number] portal/mailed-GT  6/20/23- Precall confirmed- [place]    ENDOSCOPIC ULTRASOUND OF UPPER GASTROINTESTINAL TRACT N/A 4/22/2022    Procedure: ULTRASOUND, UPPER GI TRACT, ENDOSCOPIC;  Surgeon: Max Rosado MD;  Location: [facility] ([address] [place] [address]);  Service: Endoscopy;  Laterality: N/A;  urgent EUS (linear) for abnormal CT scan with dilated PD and increased cyst of pancreas cyst.  pap 44 mmHg  4/13:fully vaccinated. instructions via portal-SC    EPIDURAL STEROID INJECTION INTO LUMBAR SPINE N/A 11/8/2018    Procedure: Injection-steroid-epidural-lumbar L5-S1;  Surgeon: Nicci Osorio Jr., MD;  Location: [facility];  Service: Pain Management;  Laterality: N/A;    FUNCTIONAL ENDOSCOPIC SINUS SURGERY (FESS)  USING COMPUTER-ASSISTED NAVIGATION N/A 2019    Procedure: FESS, USING COMPUTER-ASSISTED NAVIGATION;  Surgeon: Rosangela Isabel MD;  Location: [facility];  Service: ENT;  Laterality: N/A;  video    HYSTERECTOMY      nasal polyps         Review of patient's allergies indicates:   Allergen Reactions    Aspirin Other (See Comments)     Asthma    TRIAD OF NASAL POLYPS, ASA  ALLERGY AND ASTHMA.        Aspirin Other (See Comments)    Nsaids (non-steroidal anti-inflammatory drug) Other (See Comments)     AVOID DUE TO TRIAD OF ASA ALLERGY, NASAL POLYPS,AND ASTHMA  AVOID DUE TO TRIAD OF ASA ALLERGY, NASAL POLYPS,AND ASTHMA    Penicillin      Other reaction(s): Rash    20: tolerates ceftriaxone    Penicillin g Other (See Comments)     Other reaction(s): Rash    20: tolerates ceftriaxone       No current facility-administered medications on file prior to encounter.     Current Outpatient Medications on File Prior to Encounter   Medication Sig    ACCU-CHEK GUIDE TEST STRIPS Strp TEST FOUR TIMES DAILY WITH MEALS AND NIGHTLY    albuterol (PROVENTIL) 2.5 mg /3 mL (0.083 %) nebulizer solution Take 3 mLs (2.5 mg total) by nebulization every 6 (six) hours as needed for Wheezing or Shortness of Breath. Rescue    ascorbic acid, vitamin C, (VITAMIN C) 500 MG tablet Take 500 mg by mouth once daily.    atorvastatin (LIPITOR) 20 MG tablet Take 1 tablet (20 mg total) by mouth every evening.    budesonide 1 mg/2 mL NbSp SMARTSI Vial(s) Both Nares Twice Daily    chlorthalidone (HYGROTEN) 25 MG Tab Take 1 tablet (25 mg total) by mouth once daily.    denosumab (PROLIA) 60 mg/mL Syrg Inject 60 mg into the skin every 6 (six) months.    fluticasone-umeclidin-vilanter (TRELEGY ELLIPTA) 200-62.5-25 mcg inhaler Inhale 1 puff into the lungs once daily.    irbesartan (AVAPRO) 300 MG tablet Take 1 tablet (300 mg total) by mouth every evening.    lancets (ACCU-CHEK FASTCLIX LANCET DRUM) [facility] CHECK BLOOD GLUCOSE FOUR TIMES DAILY     "latanoprost 0.005 % ophthalmic solution Inject into the eye. 1 Drops Ophthalmic Every evening    levocetirizine (XYZAL) 5 MG tablet Take 1 tablet (5 mg total) by mouth every evening.    lipase-protease-amylase 24,000-76,000-120,000 units (CREON) 24,000-76,000 -120,000 unit capsule Take 2 capsules with meals orally and 1 capsule with snacks.    magnesium oxide (MAG-OX) 400 mg (241.3 mg magnesium) tablet Take 400 mg by mouth once daily.    montelukast (SINGULAIR) 10 mg tablet Take 1 tablet (10 mg total) by mouth every evening.    NEILMED SINUS RINSE REFILL Pack use as directed    olopatadine (PATANOL) 0.1 % ophthalmic solution Place 1 drop into both eyes 2 (two) times daily as needed for Allergies. 1 Drops Ophthalmic Twice a day    vitamin D (VITAMIN D3) 1000 units Tab Take 1,000 Units by mouth once daily.    diclofenac sodium (VOLTAREN) 1 % Gel Apply 2 g topically 4 (four) times daily.    gabapentin (NEURONTIN) 100 MG capsule Take 1 capsule (100 mg total) by mouth 3 (three) times daily. (Patient taking differently: Take 100 mg by mouth 3 (three) times daily as needed.)    insulin aspart U-100 (NOVOLOG FLEXPEN U-100 INSULIN) 100 unit/mL (3 mL) InPn pen Inject 4 Units into the skin before breakfast AND 5 Units daily with lunch AND 4 Units daily with dinner or evening meal. Plus correction scale. Max TDD 30 units..    insulin detemir U-100, Levemir, (LEVEMIR FLEXTOUCH U100 INSULIN) 100 unit/mL (3 mL) InPn pen Inject 6 Units into the skin once daily AND 5 Units every evening. (Patient taking differently: Inject 7 Units into the skin once daily AND 5 Units every evening.)    LIDOcaine HCL 3 % Crea Apply 1 application  topically 2 (two) times daily. Apply to affected area    pen needle, diabetic 31 gauge x 5/16" Ndle Use with insulin pen 5 times a day    [DISCONTINUED] risedronate (ACTONEL) 35 MG tablet Take 1 tablet (35 mg total) by mouth every 7 days.     Family History       Problem Relation (Age of Onset)    Heart " disease Father, Brother    Hypertension Brother          Tobacco Use    Smoking status: Never     Passive exposure: Never    Smokeless tobacco: Never   Substance and Sexual Activity    Alcohol use: Yes     Comment: socially    Drug use: No    Sexual activity: Yes     Partners: Male     Birth control/protection: Post-menopausal     Review of Systems   Constitutional:  Positive for activity change. Negative for appetite change and chills.   HENT: Negative.     Respiratory: Negative.     Cardiovascular: Negative.    Gastrointestinal: Negative.    Genitourinary: Negative.    Musculoskeletal: Negative.    Psychiatric/Behavioral: Negative.       Objective:     Vital Signs (Most Recent):  Temp: 98.1 °F (36.7 °C) (03/30/24 2316)  Pulse: 102 (03/30/24 2323)  Resp: 18 (03/30/24 2316)  BP: (!) 164/70 (03/30/24 2323)  SpO2: 98 % (03/30/24 2316) Vital Signs (24h Range):  Temp:  [98 °F (36.7 °C)-98.1 °F (36.7 °C)] 98.1 °F (36.7 °C)  Pulse:  [] 102  Resp:  [16-26] 18  SpO2:  [97 %-100 %] 98 %  BP: (139-218)/(64-94) 164/70     Weight: 42 kg (92 lb 9.5 oz)  Body mass index is 15.41 kg/m².     Physical Exam  Vitals and nursing note reviewed.   Constitutional:       Appearance: She is not toxic-appearing.      Comments: Thin body habitus   Eyes:      General: No scleral icterus.  Cardiovascular:      Rate and Rhythm: Regular rhythm. Tachycardia present.      Pulses: Normal pulses.   Pulmonary:      Effort: Pulmonary effort is normal. No respiratory distress.      Breath sounds: No wheezing.   Abdominal:      General: Bowel sounds are normal. There is no distension.      Palpations: Abdomen is soft.      Tenderness: There is no abdominal tenderness. There is no guarding.   Musculoskeletal:         General: Tenderness (left hip, pain w/leg movement) present.      Cervical back: Neck supple.      Right lower leg: No edema.      Left lower leg: No edema.   Skin:     General: Skin is warm and dry.      Comments: Abrasions on dorsum  "of left hand/wrist   Neurological:      General: No focal deficit present.      Mental Status: She is alert and oriented to person, place, and time. Mental status is at baseline.   Psychiatric:         Mood and Affect: Mood normal.         Behavior: Behavior normal.                Significant Labs: All pertinent labs within the past 24 hours have been reviewed.  ABGs:   Recent Labs   Lab 03/30/24 2116   PH 7.377   PCO2 26.7*   HCO3 15.6*   POCSATURATED 97   BE -10*   PO2 88*     CBC:   Recent Labs   Lab 03/1936   WBC 8.50   HGB 9.9*   HCT 30.4*        CMP:   Recent Labs   Lab 03/1936      K 4.8   *   CO2 18*   *   BUN 36*   CREATININE 2.1*   CALCIUM 9.4   PROT 6.0   ALBUMIN 3.3*   BILITOT 0.3   ALKPHOS 50*   AST 24   ALT 34   ANIONGAP 9     Cardiac Markers:   Recent Labs   Lab 03/30/24 2143   *     Coagulation:   Recent Labs   Lab 03/30/24 2035   INR 7.2*   APTT 56.6*     Lactic Acid: No results for input(s): "LACTATE" in the last 48 hours.  Magnesium:   Recent Labs   Lab 03/30/24 2035   MG 1.3*     Troponin:   Recent Labs   Lab 03/30/24 2143   TROPONINI 0.077*     Urine Studies:   Recent Labs   Lab 03/30/24 2104   COLORU Colorless*   APPEARANCEUA Clear   PHUR 5.0   SPECGRAV 1.015   PROTEINUA Negative   GLUCUA 4+*   KETONESU Negative   BILIRUBINUA Negative   OCCULTUA Negative   NITRITE Negative   LEUKOCYTESUR Negative   RBCUA 1   WBCUA 0   BACTERIA Rare       Significant Imaging: I have reviewed all pertinent imaging results/findings within the past 24 hours.    XR HIP WITH PELVIS WHEN PERFORMED, 2 OR 3 VIEWS LEFT     CLINICAL HISTORY:  Left hip pain;     TECHNIQUE:  AP view of the pelvis and frog leg lateral view of the left hip were performed.     COMPARISON:  CT abdomen and pelvis 11/18/2023     FINDINGS:  Four views left hip.     The bilateral sacroiliac joints are intact noting degenerative change.  There is osteopenia.  There are degenerative changes of the " right femoroacetabular joint.  The pubic symphysis is intact.  There is acute appearing impacted intertrochanteric fracture of the left femur.  There are surgical changes of the left inguinal region.  There is moderate to large amount of stool in the colon.  There is vascular calcification.     Impression:     1. Intertrochanteric fracture of the left femur as described.        Electronically signed by: Matteo Stallworth MD  Date:                                            03/30/2024  Time:                                           20:17    EXAMINATION:  XR CHEST AP PORTABLE     CLINICAL HISTORY:  PRE-OP;     TECHNIQUE:  Single frontal view of the chest was performed.     COMPARISON:  11/18/2023     FINDINGS:  Cardiac monitoring leads overlie the chest.  The cardiac silhouette is stable in size and configuration.  There is prominent atherosclerotic calcification of the thoracic aorta.  Lungs demonstrate chronic coarse interstitial attenuation and scattered subsegmental opacities, similar to prior examination.  No new large confluent airspace consolidation appreciated.  No significant volume of pleural fluid or pneumothorax identified.  Presumed skin fold overlies the right hemithorax.  There is a chronic appearing right lateral 6th rib deformity.  Osseous structures demonstrate degenerative changes.     Impression:     As above.        Electronically signed by: Washington Rizo MD  Date:                                            03/30/2024  Time:                                           22:37                     XR WRIST COMPLETE 3 VIEWS LEFT     CLINICAL HISTORY:  fall;     TECHNIQUE:  PA, lateral, and oblique views of the left wrist were performed.     COMPARISON:  None     FINDINGS:  There is diffuse osteopenia.  No definite radiographic evidence of acute displaced fracture or dislocation of the left wrist.  There are mild degenerative changes present.  Soft tissues are unremarkable.     Impression:     No  radiographic evidence of acute displaced fracture or dislocation of the left wrist.        Electronically signed by: Washington Rizo MD  Date:                                            03/30/2024  Time:                                           22:39        Assessment/Plan:     * Closed 2-part intertrochanteric fracture of left femur  -start hip fracture pathway  -npo at midnight   -give dilaudid 0.5mg Iv x1 now - suspsect her hypertension is secondary to lack of pain control, but is improving   -orthopedic surgery consulted - plans for operative intervention tomorrow   Multimodal pain control    Avoid NSAIDs with CKD, patient on gabapentin while hold any Lyrica per pathway    Preop cardiovascular exam  Patient undergoing non-emergent non-cardiac surgery.  Patient does not have active cardiac problems (unstable angina, decompensated heart failure, significant uncontrolled tachy or [place] arrhythmias or severe valvular heart disease or recent MI within past 30 days).  Patient undergoing intermediate risk surgery.  Functional Status: Patient has functional METs > or = 4  5.72 by DASI    Revised cardiac risk index (RCRI) score is 2.    (1 point each for any of the following conditions: Ischemic Heart Disease/CAD, Cerebrovascular Disease, Compensated congestive heart failure, Diabetes requiring insulin therapy, Serum creatinine > 2)   ACS-NQSIP  Risk Calculator - 2.7% cardiac complication        Other Issues:       Patient on long term anticoagulation: No      Recent coronary stenting: No    Patient with acceptable cardiac risk with (RCRI score of 2) going for intermediate risk surgery. No absolute contraindications to the proposed surgery at this time. low risk of post-op pulmonary complications.  intermediate risk of post-op delirium.   - Okay to proceed with planned surgery with no additional cardiac testing needed prior to surgery.      - for high risk of post-op delirium, minimize CNS-active meds (opiates, benzos,  anticholinergics) and sleep hygiene with shades open during day, no daytime naps, frequent re-orientation, private room if feasible      Type 2 diabetes mellitus with microalbuminuria, without long-term current use of insulin  She presents with uncontrolled hyperglycemia, no anion gap VBG demonstrates stable PH no ketonemia.  Her serum osmolality is 336, no altered mental status.  And based on calculating osmolality, gap is 14 within normal range.  Do not believe she has HHS at this time no other acute symptoms of hyperglycemic crisis.  -she received 5 units IV regular insulin in the ED and glucose was down to 294  -give 6 units of Levemir tonight and then will continue patient on Levemir 5 units b.i.d. low-dose insulin sliding scale and when she is eating prandial insulin 3 units with meals.      A1c is not severely uncontrolled we might expect with that he hyperglycemic crisis  -at this time believe patient is okay for a med tele unit and will not require insulin drip      Stage 3 chronic kidney disease  Creatine stable for now. BMP reviewed- noted Estimated Creatinine Clearance: 12.5 mL/min (A) (based on SCr of 2.1 mg/dL (H)). according to latest data. Based on current GFR, CKD stage is stage 3 - GFR 30-59.  Monitor UOP and serial BMP and adjust therapy as needed. Renally dose meds. Avoid nephrotoxic medications and procedures.    Her Creatinine is mildly increased from baseline   Per pathway - will give IV fluids after midnight but reduced rate 50cc/hr x 10 hours    Senile osteoporosis  -f/u vitamin D level   -resume home dosage  -patient on PROLIA every 6 months - last dose in Feb 2024      Asthma, moderate persistent  Patient had recent asthma exacerbation few weeks ago - uses nebulizers BID - continue this schedule  -Use BREO+ SPIRIVA in place of home TRELEGY  -continue home singulair.       Primary hypertension  Chronic, controlled. Latest blood pressure and vitals reviewed-   Home meds for hypertension were  reviewed and noted below.   Hypertension Medications               chlorthalidone (HYGROTEN) 25 MG Tab Take 1 tablet (25 mg total) by mouth once daily.    irbesartan (AVAPRO) 300 MG tablet Take 1 tablet (300 mg total) by mouth every evening.            While in the hospital, will manage blood pressure as follows; Adjust home antihypertensive regimen as follows- Use dose adjusted Losartan 50mg given slightly elevated creatinine,  equivalent of home dose irbesartan is losartan 100mg, titrate as needed. Hold chlorthalidone tomorrow, resume if renal function remains stable     Atrial fibrillation, unspecified type  Patient with Paroxysmal (<7 days) atrial fibrillation which is controlled currently with  on no direct rate or rhythm control . Patient is currently in sinus rhythm.[id number] Score: 4. . Anticoagulation not indicated due to not on as outpatient, not indicated to start now .    Pancreatic insufficiency  -when patient is eating diet again -resume home dosage of Creon 120, @ 2 tabs with meals and 1 prn with snacks      Carcinoid bronchial adenoma, right  Per overview an indolent incidental finding that is being observed with surveillance as an outpatient, on no active treatment.         VTE Risk Mitigation (From admission, onward)           Ordered     IP VTE HIGH RISK PATIENT  Once         03/31/24 0024     Place sequential compression device  Until discontinued         03/31/24 0024                     Deep Blevins MD  Department of Hospital Medicine  [facility] - Surgery

## 2024-03-31 NOTE — PLAN OF CARE
Hg 6.9 post op. Appears did not get blood pre op likely as consent was not on floor earlier and with ortho as nursing notified me no consent on floor earlier. 1 U ortho ordered order likely still active, message sent to [facility] floor nurse to see if blood on floor to transfuse once back on floor, if not will need to reorder unit  Awaiting poct glucose to see if need to adjust novolog for lunch to more than 6 u

## 2024-03-31 NOTE — CONSULTS
[facility]  Orthopedics  Consult Note    Patient Name: Sussy Costa  MRN: [id number]  Admission Date: 3/30/2024  Hospital Length of Stay: 0 days  Attending Provider: Glenny Layton MD  Primary Care Provider: PAULA Casillas MD    Inpatient consult to Orthopedic Surgery  Consult performed by: PEGGY Guido MD  Consult ordered by: Darrell Sanchez III, MD        Subjective:     Principal Problem:Closed 2-part intertrochanteric fracture of left femur    Chief Complaint:   Chief Complaint   Patient presents with    Fall     Pt. Had a trip and fall falling to the left side and left hip.  Non weight bearing, unwilling to straighten left leg.   mg/dL. Did not hit head, no blood thinners on board.        HPI: Sussy Costa is a 88 y.o. female with PMH of adenocarcinoma of the right colon complicated by large bowel obstruction s/p right colectomy with Dr. Holman on 10/25/22, asthma, T2DM, HLD, HTN, and osteoporosis presenting with left hip pain. Patient was walking in her kitchen and fell onto the left hip. Immediate pain and difficulty bearing weight. Denies head injury or LOC. On no blood thinners. Denies other MSK pains. Denies numbness, tingling, or paresthesias to the LLE. Lives with her  and has no stairs at home. Uses no assistive device for ambulation at baseline.  The patient reports that she has some mild pain to the left wrist as well as a superficial laceration to the dorsum of the left hand.  She denies other MSK pains.    Nonsmoker   No history of chemotherapy or radiation   No history of fracture, dislocation, or other orthopedic injuries to the left lower extremity.    Not on any anticoagulation at baseline.    Does not use any assistive devices for ambulation at baseline.        Past Medical History:   Diagnosis Date    Asthma     Cyst of pancreas     liver    Diabetes mellitus type II     Eczema of hand     Glaucoma     History of recurrent pneumonia 9/28/2020     Hyperlipidemia     Hypertension     Lichen planus     gums    Osteoporosis     Pulmonary nodules        Past Surgical History:   Procedure Laterality Date    BRONCHOSCOPY N/A 5/13/2022    Procedure: Bronchoscopy;  Surgeon: [facility];  [facility];  Service: Anesthesiology;  Laterality: N/A;    CATARACT EXTRACTION, BILATERAL      CHOLECYSTECTOMY      COLECTOMY, RIGHT Right 10/25/2022    Procedure: COLECTOMY, RIGHT;  Surgeon: Jass Holman MD;  Location: [facility] OR [facility] FLR;  Service: Colon and Rectal;  Laterality: Right;    COLONOSCOPY N/A 6/26/2023    Procedure: COLONOSCOPY;  Surgeon: Jass Holman MD;  Location: [facility] ENDO (2ND FLR);  Service: Endoscopy;  Laterality: N/A;  2nd floor -lung disease  referral Dr [id number] portal/mailed-GT  6/20/23- Precall confirmed- [place]    ENDOSCOPIC ULTRASOUND OF UPPER GASTROINTESTINAL TRACT N/A 4/22/2022    Procedure: ULTRASOUND, UPPER GI TRACT, ENDOSCOPIC;  Surgeon: Max Rosado MD;  [facility]  Service: Endoscopy;  Laterality: N/A;  urgent EUS (linear) for abnormal CT scan with dilated PD and increased cyst of pancreas cyst.  pap 44 mmHg  4/13:fully vaccinated. instructions via portal-SC    EPIDURAL STEROID INJECTION INTO LUMBAR SPINE N/A 11/8/2018    Procedure: Injection-steroid-epidural-lumbar L5-S1;  Surgeon: Nicci Osorio Jr., MD;  Location: [facility] PAIN MGT;  Service: Pain Management;  Laterality: N/A;    FUNCTIONAL ENDOSCOPIC SINUS SURGERY (FESS) USING COMPUTER-ASSISTED NAVIGATION N/A 6/17/2019    Procedure: FESS, USING COMPUTER-ASSISTED NAVIGATION;  Surgeon: Rosangela Isabel MD;  Location: [facility] OR;  Service: ENT;  Laterality: N/A;  video    HYSTERECTOMY      nasal polyps         Review of patient's allergies indicates:   Allergen Reactions    Aspirin Other (See Comments)     Asthma    TRIAD OF NASAL POLYPS, ASA  ALLERGY AND ASTHMA.        Aspirin Other (See Comments)    Nsaids (non-steroidal  anti-inflammatory drug) Other (See Comments)     AVOID DUE TO TRIAD OF ASA ALLERGY, NASAL POLYPS,AND ASTHMA  AVOID DUE TO TRIAD OF ASA ALLERGY, NASAL POLYPS,AND ASTHMA    Penicillin      Other reaction(s): Rash    20: tolerates ceftriaxone    Penicillin g Other (See Comments)     Other reaction(s): Rash    20: tolerates ceftriaxone       No current facility-administered medications for this encounter.     Current Outpatient Medications   Medication Sig    ACCU-CHEK GUIDE TEST STRIPS Strp TEST FOUR TIMES DAILY WITH MEALS AND NIGHTLY    albuterol (PROVENTIL) 2.5 mg /3 mL (0.083 %) nebulizer solution Take 3 mLs (2.5 mg total) by nebulization every 6 (six) hours as needed for Wheezing or Shortness of Breath. Rescue    albuterol (PROVENTIL) 2.5 mg /3 mL (0.083 %) nebulizer solution Take 3 mLs (2.5 mg total) by nebulization every 6 (six) hours as needed for Wheezing or Shortness of Breath. Rescue    atorvastatin (LIPITOR) 20 MG tablet Take 1 tablet (20 mg total) by mouth every evening.    budesonide 1 mg/2 mL NbSp SMARTSI Vial(s) Both Nares Twice Daily    chlorthalidone (HYGROTEN) 25 MG Tab Take 1 tablet (25 mg total) by mouth once daily.    denosumab (PROLIA) 60 mg/mL Syrg Inject 60 mg into the skin every 6 (six) months.    diclofenac sodium (VOLTAREN) 1 % Gel Apply 2 g topically 4 (four) times daily.    fluticasone-umeclidin-vilanter (TRELEGY ELLIPTA) 200-62.5-25 mcg inhaler Inhale 1 puff into the lungs once daily.    gabapentin (NEURONTIN) 100 MG capsule Take 1 capsule (100 mg total) by mouth 3 (three) times daily.    insulin aspart U-100 (NOVOLOG FLEXPEN U-100 INSULIN) 100 unit/mL (3 mL) InPn pen Inject 4 Units into the skin before breakfast AND 5 Units daily with lunch AND 4 Units daily with dinner or evening meal. Plus correction scale. Max TDD 30 units..    insulin detemir U-100, Levemir, (LEVEMIR FLEXTOUCH U100 INSULIN) 100 unit/mL (3 mL) InPn pen Inject 6 Units into the skin once daily AND 5 Units  "every evening.    irbesartan (AVAPRO) 300 MG tablet Take 1 tablet (300 mg total) by mouth every evening.    lancets (ACCU-CHEK FASTCLIX LANCET DRUM) [facility] CHECK BLOOD GLUCOSE FOUR TIMES DAILY    latanoprost 0.005 % ophthalmic solution Inject into the eye. 1 Drops Ophthalmic Every evening    levocetirizine (XYZAL) 5 MG tablet Take 1 tablet (5 mg total) by mouth every evening.    LIDOcaine HCL 3 % Crea Apply 1 application  topically 2 (two) times daily. Apply to affected area    lipase-protease-amylase 24,000-76,000-120,000 units (CREON) 24,000-76,000 -120,000 unit capsule Take 2 capsules with meals orally and 1 capsule with snacks.    magnesium oxide (MAG-OX) 400 mg (241.3 mg magnesium) tablet Take 400 mg by mouth once daily.    montelukast (SINGULAIR) 10 mg tablet Take 1 tablet (10 mg total) by mouth every evening.    NEILMED SINUS RINSE REFILL Pack use as directed    olopatadine (PATANOL) 0.1 % ophthalmic solution Place 1 drop into both eyes 2 (two) times daily as needed. 1 Drops Ophthalmic Twice a day     pen needle, diabetic 31 gauge x 5/16" Ndle Use with insulin pen 5 times a day    vitamin D (VITAMIN D3) 1000 units Tab Take 1,000 Units by mouth once daily.     Family History       Problem Relation (Age of Onset)    Heart disease Father, Brother    Hypertension Brother          Tobacco Use    Smoking status: Never     Passive exposure: Never    Smokeless tobacco: Never   Substance and Sexual Activity    Alcohol use: Yes     Comment: socially    Drug use: No    Sexual activity: Yes     Partners: Male     Birth control/protection: Post-menopausal     ROS  Constitutional: negative for fevers or chills  Eyes: negative visual changes or eye discharge  ENT: negative for ear pain or sore throat  Respiratory: negative for shortness of breath or cough  Cardiovascular: negative for chest pain or palpitations  Gastrointestinal: negative for abdominal pain, nausea, or vomiting  Genitourinary: negative for dysuria and flank " "pain  Neurological: negative for headaches or dizziness  Musculoskeletal: positive for left hip pain, left hand laceration, left wrist pain     Objective:     Vital Signs (Most Recent):  Temp: 98 °F (36.7 °C) (03/30/24 1851)  Pulse: 95 (03/30/24 2008)  Resp: 16 (03/30/24 2001)  BP: (!) 205/94 (03/30/24 2008)  SpO2: 100 % (03/30/24 2008) Vital Signs (24h Range):  Temp:  [98 °F (36.7 °C)] 98 °F (36.7 °C)  Pulse:  [95-96] 95  Resp:  [16-18] 16  SpO2:  [97 %-100 %] 100 %  BP: (197-205)/(92-94) 205/94     Weight: 49 kg (108 lb)  Height: 5' 5" (165.1 cm)  Body mass index is 17.97 kg/m².    No intake or output data in the 24 hours ending 03/30/24 2105     Ortho/SPM Exam    General:  no acute distress, appears stated age   Neuro: alert and oriented x3  Psych: normal mood  Head: normocephalic, atraumatic.  Eyes: no scleral icterus  Mouth: moist mucous membranes  CV: extremities warm and well perfused  Pulm: breathing comfortably, equal chest rise bilat  Skin: clean, dry, intact (any exceptions noted in below musculoskeletal exam)    MSK:    RUE:  - Skin intact throughout, no open wounds  - No swelling  - No ecchymosis, erythema, or signs of cellulitis  - NonTTP throughout  - AROM and PROM of the shoulder, elbow, wrist, and hand intact without pain  - Axillary/AIN/PIN/Radial/Median/Ulnar Nerves assessed in isolation without deficit  - SILT throughout  - Compartments soft  - Radial artery palpated   - Capillary Refill <3s    LUE:  - superficial laceration/skin tear to the dorsum of the left hand  - No swelling  - No ecchymosis, erythema, or signs of cellulitis  - minimally tender to palpation at the ulnar side of the wrist.  Otherwise nontender to palpation throughout  - AROM and PROM of the shoulder, elbow, wrist, and hand intact without pain  - Axillary/AIN/PIN/Radial/Median/Ulnar Nerves assessed in isolation without deficit  - SILT throughout  - Compartments soft  - Radial artery palpated   - Capillary Refill <3s    RLE:  - " Skin intact throughout, no open wounds  - No swelling  - No ecchymosis, erythema, or signs of cellulitis  - NonTTP throughout  - AROM and PROM of the hip, knee, ankle, and foot intact without pain  - TA/EHL/Gastroc/FHL assessed in isolation without deficit  - SILT throughout  - Compartments soft  - DP and PT palpated  - Capillary Refill <3s  - Negative Log roll  - Negative [facility]    LLE:  - the left leg is shortened and externally rotated at rest.  - Skin intact throughout, no scars present  - No swelling  - No ecchymosis, erythema, or signs of cellulitis  - TTP at hip/proximal femur  - No tenderness to palpation of middle or distal femur  - No tenderness to palpation of proximal, middle, or distal aspects of tibia or fibula  - No tenderness to palpation of foot  - Pain with any ROM at hip  - Full painless ROM of knee and ankle  - Compartments soft  - SILT Sa/Stephens/DP/SP/T  - Motor intact EHL/FHL/TA/Gastroc  - 2+ DP  - Brisk capillary refill      Spine/pelvis/axial body:  No tenderness to palpation of cervical, thoracic, or lumbar spine  No pain with compression of pelvis   No sacral decubitus ulcer     Significant Labs: All pertinent labs within the past 24 hours have been reviewed.    Significant Imaging: X-Ray: I have reviewed all pertinent results/findings and my personal findings are:  Displaced left intertrochanteric femur fracture  Assessment/Plan:     * Closed 2-part intertrochanteric fracture of left femur  Sussy Costa is a 88 y.o. female with a left intertrochanteric femur fracture, closed, NVI. They take no anticoagulation at home. They required no ambulatory assistive devices prior to this injury.     -Admitted to medicine hip fracture service for pre-operative clearance and medical evaluation  -To OR 3/31/24 for operative fixation of left intertrochanteric femur fracture  -Pt marked, booked, and consented for surgery  -DVT PPx: Hold anticoagulation  -Abx: Preop abx ordered  -Labs: Pending  -Bed  rest, ca, NPO at midnight  -Iv: ordered for contralateral arm    Patient was explained in detail the severity of the injury that was suffered. Patient was explained the risks/benefits/and alternatives to operative management in detail including infection, bleeding, pain, nerve and vascular damage, heterotopic ossification, leg length discrepancies, rotational deformities and they express full understanding.  We discussed the 30% national first year mortality rate with hip fractures, the need for early mobilization, and the expected rehab course.  She expresses full understanding of the condition and expresses that they want to proceed with surgery. The patient is admitted to the medicine hip fracture service for optimization of medical comorbidities. Will plan for OR 3/31/24. No guarantees were made, informed consent was obtained. All questions were answered to patient's and family's satisfaction.        ARIANNE Guido MD  Orthopedics  [facility]

## 2024-03-31 NOTE — ED PROVIDER NOTES
Emergency Department Provider Note    Sussy Costa   88 y.o. female   [id number]      3/30/2024       History     This history was obtained from the patient without limitations.      She is an 88-year-old with the below past medical history who presents by ambulance following a mechanical fall.  She landed on her left hip and complains of severe pain and immobility at the joint.  She is unable to weight bear on the left lower extremity.  She denies other injuries.         Past Medical History:   Diagnosis Date    Asthma     Cyst of pancreas     liver    Diabetes mellitus type II     Eczema of hand     Glaucoma     History of recurrent pneumonia 9/28/2020    Hyperlipidemia     Hypertension     Lichen planus     gums    Osteoporosis     Pulmonary nodules       Past Surgical History:   Procedure Laterality Date    BRONCHOSCOPY N/A 5/13/2022    Procedure: Bronchoscopy;  Surgeon: [facility] Diagnostic Provider;  [facility];  Service: Anesthesiology;  Laterality: N/A;    CATARACT EXTRACTION, BILATERAL      CHOLECYSTECTOMY      COLECTOMY, RIGHT Right 10/25/2022    Procedure: COLECTOMY, RIGHT;  Surgeon: Jass Holman MD;  [facility];  Service: Colon and Rectal;  Laterality: Right;    COLONOSCOPY N/A 6/26/2023    Procedure: COLONOSCOPY;  Surgeon: Jass Holman MD;  Location: [facility] ENDO (2ND FLR);  Service: Endoscopy;  Laterality: N/A;  2nd floor -lung disease  referral Dr [id number]-htci-foqaa portal/mailed-GT  6/20/23- Precall confirmed- [place]    ENDOSCOPIC ULTRASOUND OF UPPER GASTROINTESTINAL TRACT N/A 4/22/2022    Procedure: ULTRASOUND, UPPER GI TRACT, ENDOSCOPIC;  Surgeon: Max Rosado MD;  [facility]  Service: Endoscopy;  Laterality: N/A;  urgent EUS (linear) for abnormal CT scan with dilated PD and increased cyst of pancreas cyst.  pap 44 mmHg  4/13:fully vaccinated. instructions via portal-SC    EPIDURAL STEROID INJECTION INTO LUMBAR SPINE N/A 11/8/2018     Procedure: Injection-steroid-epidural-lumbar L5-S1;  Surgeon: Nicci Osorio Jr., MD;  Location: [facility] PAIN MGT;  Service: Pain Management;  Laterality: N/A;    FUNCTIONAL ENDOSCOPIC SINUS SURGERY (FESS) USING COMPUTER-ASSISTED NAVIGATION N/A 6/17/2019    Procedure: FESS, USING COMPUTER-ASSISTED NAVIGATION;  Surgeon: Rosangela Isabel MD;  Location: [facility] OR;  Service: ENT;  Laterality: N/A;  video    HYSTERECTOMY      nasal polyps        Family History   Problem Relation Age of Onset    Heart disease Father     Heart disease Brother     Hypertension Brother       Social History     Socioeconomic History    Marital status:      Spouse name: chuck    Number of children: 1   Occupational History    Occupation:    Tobacco Use    Smoking status: Never     Passive exposure: Never    Smokeless tobacco: Never   Substance and Sexual Activity    Alcohol use: Yes     Comment: socially    Drug use: No    Sexual activity: Yes     Partners: Male     Birth control/protection: Post-menopausal   Social History Narrative    Lives in [place] with  Chuck. Daughter passed away in 2017 with leukemia. Two adult grandchildren. Former .     Social Determinants of Health     Financial Resource Strain: Patient Declined (1/10/2024)    Overall Financial Resource Strain (CARDIA)     Difficulty of Paying Living Expenses: Patient declined   Food Insecurity: Patient Declined (1/10/2024)    Hunger Vital Sign     Worried About Running Out of Food in the Last Year: Patient declined     Ran Out of Food in the Last Year: Patient declined   Transportation Needs: Patient Declined (1/10/2024)    PRAPARE - Transportation     Lack of Transportation (Medical): Patient declined     Lack of Transportation (Non-Medical): Patient declined   Physical Activity: Insufficiently Active (12/5/2023)    Exercise Vital Sign     Days of Exercise per Week: 1 day     Minutes of Exercise per Session: 10 min   Stress: No Stress  Concern Present (1/10/2024)    [employer] - Occupational Stress Questionnaire     Feeling of Stress : Only a little   Recent Concern: Stress - Stress Concern Present (11/19/2023)    [employer] - Occupational Stress Questionnaire     Feeling of Stress : To some extent   Social Connections: Unknown (1/10/2024)    Social Connection and Isolation Panel [NHANES]     Frequency of Communication with Friends and Family: Patient declined     Frequency of Social Gatherings with Friends and Family: Patient declined     Attends Yazidi Services: More than 4 times per year     Active Member of Clubs or Organizations: Patient declined     Attends Club or Organization Meetings: Patient declined     Marital Status: Patient declined   Housing Stability: Unknown (1/10/2024)    Housing Stability Vital Sign     Unable to Pay for Housing in the Last Year: Patient refused     Number of Places Lived in the Last Year: 1     Unstable Housing in the Last Year: Patient declined      Review of patient's allergies indicates:   Allergen Reactions    Aspirin Other (See Comments)     Asthma    TRIAD OF NASAL POLYPS, ASA  ALLERGY AND ASTHMA.        Aspirin Other (See Comments)    Nsaids (non-steroidal anti-inflammatory drug) Other (See Comments)     AVOID DUE TO TRIAD OF ASA ALLERGY, NASAL POLYPS,AND ASTHMA  AVOID DUE TO TRIAD OF ASA ALLERGY, NASAL POLYPS,AND ASTHMA    Penicillin      Other reaction(s): Rash    9/30/20: tolerates ceftriaxone    Penicillin g Other (See Comments)     Other reaction(s): Rash    9/30/20: tolerates ceftriaxone           Physical Examination     Initial Vitals [03/30/24 1851]   BP Pulse Resp Temp SpO2   (!) 197/92 96 18 98 °F (36.7 °C) 97 %      MAP       --           Physical Exam    Nursing note and vitals reviewed.  Constitutional: She is not diaphoretic. She appears distressed.   HENT:   Head: Normocephalic and atraumatic.   Cardiovascular:             Pulses:       Dorsalis pedis pulses are 2+ on the left side.        Posterior tibial pulses are 2+ on the left side.   Pulmonary/Chest: No respiratory distress.   Musculoskeletal:      Left hip: Deformity and tenderness present. Decreased range of motion.     Neurological: She is alert and oriented to person, place, and time. GCS score is 15. GCS eye subscore is 4. GCS verbal subscore is 5. GCS motor subscore is 6.   Normal sensation to touch of the left foot.   Skin: Skin is warm and dry. No pallor.   Psychiatric: She has a normal mood and affect. Her speech is normal and behavior is normal. She is not actively hallucinating. She is attentive.            Labs     Labs Reviewed   CBC W/ AUTO DIFFERENTIAL - Abnormal; Notable for the following components:       Result Value    RBC 3.13 (*)     Hemoglobin 9.9 (*)     Hematocrit 30.4 (*)     MCH 31.6 (*)     Immature Granulocytes 0.6 (*)     Immature Grans (Abs) 0.05 (*)     Gran % 79.3 (*)     Lymph % 13.6 (*)     All other components within normal limits   COMPREHENSIVE METABOLIC PANEL - Abnormal; Notable for the following components:    Chloride 112 (*)     CO2 18 (*)     Glucose 578 (*)     BUN 36 (*)     Creatinine 2.1 (*)     Albumin 3.3 (*)     Alkaline Phosphatase 50 (*)     eGFR 22.4 (*)     All other components within normal limits   HIV 1 / 2 ANTIBODY    Narrative:     Release to patient->Immediate   HEPATITIS C ANTIBODY    Narrative:     Release to patient->Immediate   PROTIME-INR   MAGNESIUM   PHOSPHORUS   HEMOGLOBIN A1C   TRANSFERRIN   VITAMIN D   URINALYSIS   PREALBUMIN   FERRITIN   APTT        Imaging     Imaging Results              X-Ray Knee 1 or 2 View Left (Final result)  Result time 03/30/24 20:30:47      Final result by Matteo Stallworth MD (03/30/24 20:30:47)                   Impression:      1. Allowing for positioning, no convincing acute displaced fracture or dislocation of the knee.      Electronically signed by: Matteo Stallworth  MD  Date:    03/30/2024  Time:    20:30               Narrative:    EXAMINATION:  XR KNEE 1 OR 2 VIEW LEFT    CLINICAL HISTORY:  Left hip pain;    TECHNIQUE:  One or two views of the left knee were performed.    COMPARISON:  11/02/2023    FINDINGS:  Two views left knee.    There is osteopenia.  No acute displaced fracture or dislocation of the knee.  No radiopaque foreign body.  No large knee joint effusion.  There is vascular calcification.                                       X-Ray Hip 2 or 3 views Left (with Pelvis when performed) (Final result)  Result time 03/30/24 20:17:58      Final result by Matteo Stallworth MD (03/30/24 20:17:58)                   Impression:      1. Intertrochanteric fracture of the left femur as described.      Electronically signed by: Matteo Stallworth MD  Date:    03/30/2024  Time:    20:17               Narrative:    EXAMINATION:  XR HIP WITH PELVIS WHEN PERFORMED, 2 OR 3 VIEWS LEFT    CLINICAL HISTORY:  Left hip pain;    TECHNIQUE:  AP view of the pelvis and frog leg lateral view of the left hip were performed.    COMPARISON:  CT abdomen and pelvis 11/18/2023    FINDINGS:  Four views left hip.    The bilateral sacroiliac joints are intact noting degenerative change.  There is osteopenia.  There are degenerative changes of the right femoroacetabular joint.  The pubic symphysis is intact.  There is acute appearing impacted intertrochanteric fracture of the left femur.  There are surgical changes of the left inguinal region.  There is moderate to large amount of stool in the colon.  There is vascular calcification.                                        ED Course     The patient received the following medications:  Medications   insulin regular injection 5 Units 0.05 mL (has no administration in time range)   morphine injection 4 mg (4 mg Intravenous Given 3/30/24 2001)   ondansetron injection 4 mg (4 mg Intravenous Given 3/30/24 2001)       ED Course as of 03/30/24 2040   Sat Mar 30,  2024 2013 WBC: 8.50 [LP]   2013 Hemoglobin(!): 9.9 [LP]   2013 Hematocrit(!): 30.4 [LP]   2013 Platelet Count: 185 [LP]   2013 Chloride(!): 112 [LP]   2013 CO2(!): 18 [LP]   2013 Glucose(!!): 578 [LP]   2013 BUN(!): 36 [LP]   2013 Creatinine(!): 2.1 [LP]   2013 Albumin(!): 3.3 [LP]   2013 ALP(!): 50 [LP]      ED Course User Index  [LP] Darrell Sanchez III, MD        Medical Decision Making                 Medical Decision Making  Patient presented following a mechanical fall onto left hip with acute pain and immobility.  Exam most concerning for hip fracture.  Patient neurovascularly intact throughout the extremity.  Intertrochanteric fracture identified on plain radiographs.  Discussed patient's fall, exam, and imaging findings with the on-call Orthopedic surgery resident, Dr. Guido, who was in the ED evaluating another patient for me. Discussed presentation, exam, imaging results, as well as acute/chronic kidney disease and DM with hyperglycemia with the on-call   provider via secure chat. Patient admitted to .    Problems Addressed:  Acute kidney injury superimposed on chronic kidney disease: chronic illness or injury with exacerbation, progression, or side effects of treatment  Closed intertrochanteric fracture of hip, left, initial encounter: acute illness or injury  Type 2 diabetes mellitus with hyperglycemia, with long-term current use of insulin: chronic illness or injury with exacerbation, progression, or side effects of treatment    Amount and/or Complexity of Data Reviewed  Labs: ordered.  Radiology: ordered.    Risk  Prescription drug management.              Diagnoses       ICD-10-CM ICD-9-CM   1. Closed intertrochanteric fracture of hip, left, initial encounter  S72.142A 820.21   2. Preoperative clearance  Z01.818 V72.84   3. Acute kidney injury superimposed on chronic kidney disease  N17.9 584.9    N18.9 585.9   4. Type 2 diabetes mellitus with hyperglycemia, with long-term current use of insulin   E11.65 250.00    Z79.4 790.29     V58.67         Dispostion      ED Disposition Condition    Admit Stable             Darrell Sanchez III, MD  03/30/24 2040

## 2024-03-31 NOTE — HPI
89 y/o WF hx with Type 2 DM, CKD stage 3, HTN, pancreatic insufficiency, Asthma, Afib presents to the ED s/p fall with left hip pain.  She was at home and putting items in her fridge when she suffered a fall.  She fell onto her left hip, left arm outstretched, she did not hit her head and had no LOC.  She had immediate severe pain and was unable to stand.  EMS contacted and patient transported to the hospital, plain films show left femoral intertrochanteric fracture.   Her  was home but did not witness the fall.   & Son-in-Law present at bedside    She reports a history of left knee weakness/issues, attends outpatient PT @ [facility] for strengthening.  She has had multiple falls in recent history.  She had 10/10 pain on arrival, received Morphine 4mg IV and reports pain 5/10 during interview, her preceding blood pressures have been 190-200 systolic.   Rechecked at bedside and down to 139s.    At baseline patient performs all ADLs, toileting/bathing/clothing/preps food/manages meds/drives.      She has no active chest pain, dyspnea, no hx of CAD or CHF, no hx of stroke.  Her DASI score is 5.27 mets.  No tobacco/drug use, occasional EToh use.   GLucose on presentation 578 - pt reports she may have overate in afternoon, is adherent to home meds, [facility] Unsure of overall code status, remain Full code pending completion of surgery.     ED Meds: Insulin 5units IV x 1, Zofran IVx1, Morphine 4mg Iv x 1

## 2024-03-31 NOTE — ASSESSMENT & PLAN NOTE
Stool burden large seen on CT pelvis on admit and bowel regimen started. If no BM in 24 hours of admit likely will add more aggressive regimen early

## 2024-03-31 NOTE — SUBJECTIVE & OBJECTIVE
Past Medical History:   Diagnosis Date    Asthma     Cyst of pancreas     liver    Diabetes mellitus type II     Eczema of hand     Glaucoma     History of recurrent pneumonia 9/28/2020    Hyperlipidemia     Hypertension     Lichen planus     gums    Osteoporosis     Pulmonary nodules        Past Surgical History:   Procedure Laterality Date    BRONCHOSCOPY N/A 5/13/2022    Procedure: Bronchoscopy;  Surgeon: [facility] Provider;  [address];  Service: Anesthesiology;  Laterality: N/A;    CATARACT EXTRACTION, BILATERAL      CHOLECYSTECTOMY      COLECTOMY, RIGHT Right 10/25/2022    Procedure: COLECTOMY, RIGHT;  Surgeon: Jass Holman MD;  [facility];  Service: Colon and Rectal;  Laterality: Right;    COLONOSCOPY N/A 6/26/2023    Procedure: COLONOSCOPY;  Surgeon: Jass Holman MD;  [facility]  Service: Endoscopy;  Laterality: N/A;  2nd floor -lung disease  referral Dr MartinezLjvirfq-zvor-yfjmg portal/mailed-GT  6/20/23- Precall confirmed- [place]    ENDOSCOPIC ULTRASOUND OF UPPER GASTROINTESTINAL TRACT N/A 4/22/2022    Procedure: ULTRASOUND, UPPER GI TRACT, ENDOSCOPIC;  Surgeon: Max Rosado MD;  [facility]  Service: Endoscopy;  Laterality: N/A;  urgent EUS (linear) for abnormal CT scan with dilated PD and increased cyst of pancreas cyst.  pap 44 mmHg  4/13:fully vaccinated. instructions via portal-SC    EPIDURAL STEROID INJECTION INTO LUMBAR SPINE N/A 11/8/2018    Procedure: Injection-steroid-epidural-lumbar L5-S1;  Surgeon: Nicci Osorio Jr., MD;  Location: [facility] PAIN MGT;  Service: Pain Management;  Laterality: N/A;    FUNCTIONAL ENDOSCOPIC SINUS SURGERY (FESS) USING COMPUTER-ASSISTED NAVIGATION N/A 6/17/2019    Procedure: FESS, USING COMPUTER-ASSISTED NAVIGATION;  Surgeon: Rosangela Isabel MD;  Location: [facility] OR;  Service: ENT;  Laterality: N/A;  video    HYSTERECTOMY      nasal polyps         Review of patient's allergies  indicates:   Allergen Reactions    Aspirin Other (See Comments)     Asthma    TRIAD OF NASAL POLYPS, ASA  ALLERGY AND ASTHMA.        Aspirin Other (See Comments)    Nsaids (non-steroidal anti-inflammatory drug) Other (See Comments)     AVOID DUE TO TRIAD OF ASA ALLERGY, NASAL POLYPS,AND ASTHMA  AVOID DUE TO TRIAD OF ASA ALLERGY, NASAL POLYPS,AND ASTHMA    Penicillin      Other reaction(s): Rash    20: tolerates ceftriaxone    Penicillin g Other (See Comments)     Other reaction(s): Rash    20: tolerates ceftriaxone       No current facility-administered medications for this encounter.     Current Outpatient Medications   Medication Sig    ACCU-CHEK GUIDE TEST STRIPS Strp TEST FOUR TIMES DAILY WITH MEALS AND NIGHTLY    albuterol (PROVENTIL) 2.5 mg /3 mL (0.083 %) nebulizer solution Take 3 mLs (2.5 mg total) by nebulization every 6 (six) hours as needed for Wheezing or Shortness of Breath. Rescue    albuterol (PROVENTIL) 2.5 mg /3 mL (0.083 %) nebulizer solution Take 3 mLs (2.5 mg total) by nebulization every 6 (six) hours as needed for Wheezing or Shortness of Breath. Rescue    atorvastatin (LIPITOR) 20 MG tablet Take 1 tablet (20 mg total) by mouth every evening.    budesonide 1 mg/2 mL NbSp SMARTSI Vial(s) Both Nares Twice Daily    chlorthalidone (HYGROTEN) 25 MG Tab Take 1 tablet (25 mg total) by mouth once daily.    denosumab (PROLIA) 60 mg/mL Syrg Inject 60 mg into the skin every 6 (six) months.    diclofenac sodium (VOLTAREN) 1 % Gel Apply 2 g topically 4 (four) times daily.    fluticasone-umeclidin-vilanter (TRELEGY ELLIPTA) 200-62.5-25 mcg inhaler Inhale 1 puff into the lungs once daily.    gabapentin (NEURONTIN) 100 MG capsule Take 1 capsule (100 mg total) by mouth 3 (three) times daily.    insulin aspart U-100 (NOVOLOG FLEXPEN U-100 INSULIN) 100 unit/mL (3 mL) InPn pen Inject 4 Units into the skin before breakfast AND 5 Units daily with lunch AND 4 Units daily with dinner or evening meal. Plus  "correction scale. Max TDD 30 units..    insulin detemir U-100, Levemir, (LEVEMIR FLEXTOUCH U100 INSULIN) 100 unit/mL (3 mL) InPn pen Inject 6 Units into the skin once daily AND 5 Units every evening.    irbesartan (AVAPRO) 300 MG tablet Take 1 tablet (300 mg total) by mouth every evening.    lancets (ACCU-CHEK FASTCLIX LANCET DRUM) [facility] CHECK BLOOD GLUCOSE FOUR TIMES DAILY    latanoprost 0.005 % ophthalmic solution Inject into the eye. 1 Drops Ophthalmic Every evening    levocetirizine (XYZAL) 5 MG tablet Take 1 tablet (5 mg total) by mouth every evening.    LIDOcaine HCL 3 % Crea Apply 1 application  topically 2 (two) times daily. Apply to affected area    lipase-protease-amylase 24,000-76,000-120,000 units (CREON) 24,000-76,000 -120,000 unit capsule Take 2 capsules with meals orally and 1 capsule with snacks.    magnesium oxide (MAG-OX) 400 mg (241.3 mg magnesium) tablet Take 400 mg by mouth once daily.    montelukast (SINGULAIR) 10 mg tablet Take 1 tablet (10 mg total) by mouth every evening.    NEILMED SINUS RINSE REFILL Pack use as directed    olopatadine (PATANOL) 0.1 % ophthalmic solution Place 1 drop into both eyes 2 (two) times daily as needed. 1 Drops Ophthalmic Twice a day     pen needle, diabetic 31 gauge x 5/16" Ndle Use with insulin pen 5 times a day    vitamin D (VITAMIN D3) 1000 units Tab Take 1,000 Units by mouth once daily.     Family History       Problem Relation (Age of Onset)    Heart disease Father, Brother    Hypertension Brother          Tobacco Use    Smoking status: Never     Passive exposure: Never    Smokeless tobacco: Never   Substance and Sexual Activity    Alcohol use: Yes     Comment: socially    Drug use: No    Sexual activity: Yes     Partners: Male     Birth control/protection: Post-menopausal     ROS  Constitutional: negative for fevers or chills  Eyes: negative visual changes or eye discharge  ENT: negative for ear pain or sore throat  Respiratory: negative for shortness of " "breath or cough  Cardiovascular: negative for chest pain or palpitations  Gastrointestinal: negative for abdominal pain, nausea, or vomiting  Genitourinary: negative for dysuria and flank pain  Neurological: negative for headaches or dizziness  Musculoskeletal: positive for left hip pain, left hand laceration, left wrist pain     Objective:     Vital Signs (Most Recent):  Temp: 98 °F (36.7 °C) (03/30/24 1851)  Pulse: 95 (03/30/24 2008)  Resp: 16 (03/30/24 2001)  BP: (!) 205/94 (03/30/24 2008)  SpO2: 100 % (03/30/24 2008) Vital Signs (24h Range):  Temp:  [98 °F (36.7 °C)] 98 °F (36.7 °C)  Pulse:  [95-96] 95  Resp:  [16-18] 16  SpO2:  [97 %-100 %] 100 %  BP: (197-205)/(92-94) 205/94     Weight: 49 kg (108 lb)  Height: 5' 5" (165.1 cm)  Body mass index is 17.97 kg/m².    No intake or output data in the 24 hours ending 03/30/24 2105     Ortho/SPM Exam    General:  no acute distress, appears stated age   Neuro: alert and oriented x3  Psych: normal mood  Head: normocephalic, atraumatic.  Eyes: no scleral icterus  Mouth: moist mucous membranes  CV: extremities warm and well perfused  Pulm: breathing comfortably, equal chest rise bilat  Skin: clean, dry, intact (any exceptions noted in below musculoskeletal exam)    MSK:    RUE:  - Skin intact throughout, no open wounds  - No swelling  - No ecchymosis, erythema, or signs of cellulitis  - NonTTP throughout  - AROM and PROM of the shoulder, elbow, wrist, and hand intact without pain  - Axillary/AIN/PIN/Radial/Median/Ulnar Nerves assessed in isolation without deficit  - SILT throughout  - Compartments soft  - Radial artery palpated   - Capillary Refill <3s    LUE:  - superficial laceration/skin tear to the dorsum of the left hand  - No swelling  - No ecchymosis, erythema, or signs of cellulitis  - minimally tender to palpation at the ulnar side of the wrist.  Otherwise nontender to palpation throughout  - AROM and PROM of the shoulder, elbow, wrist, and hand intact without " pain  - Axillary/AIN/PIN/Radial/Median/Ulnar Nerves assessed in isolation without deficit  - SILT throughout  - Compartments soft  - Radial artery palpated   - Capillary Refill <3s    RLE:  - Skin intact throughout, no open wounds  - No swelling  - No ecchymosis, erythema, or signs of cellulitis  - NonTTP throughout  - AROM and PROM of the hip, knee, ankle, and foot intact without pain  - TA/EHL/Gastroc/FHL assessed in isolation without deficit  - SILT throughout  - Compartments soft  - DP and PT palpated  - Capillary Refill <3s  - Negative Log roll  - Negative [facility]    LLE:  - the left leg is shortened and externally rotated at rest.  - Skin intact throughout, no scars present  - No swelling  - No ecchymosis, erythema, or signs of cellulitis  - TTP at hip/proximal femur  - No tenderness to palpation of middle or distal femur  - No tenderness to palpation of proximal, middle, or distal aspects of tibia or fibula  - No tenderness to palpation of foot  - Pain with any ROM at hip  - Full painless ROM of knee and ankle  - Compartments soft  - SILT Sa/Stephens/DP/SP/T  - Motor intact EHL/FHL/TA/Gastroc  - 2+ DP  - Brisk capillary refill      Spine/pelvis/axial body:  No tenderness to palpation of cervical, thoracic, or lumbar spine  No pain with compression of pelvis   No sacral decubitus ulcer     Significant Labs: All pertinent labs within the past 24 hours have been reviewed.    Significant Imaging: X-Ray: I have reviewed all pertinent results/findings and my personal findings are:  Displaced left intertrochanteric femur fracture

## 2024-03-31 NOTE — ANESTHESIA PREPROCEDURE EVALUATION
[facility]  Anesthesia Pre-Operative Evaluation         Patient Name: Sussy Costa  YOB: 1935  MRN: [id number]    SUBJECTIVE:     Pre-operative Evaluation for Procedure(s) (LRB):  INSERTION, INTRAMEDULLARY CAYDEN, FEMUR - Left, Brethren, C-arm clockside, Citra Table (Left)     03/30/2024    Sussy Costa is a 88 y.o. female with a PMHx significant for adenocarcinoma of the right colon complicated by large bowel obstruction s/p right colectomy with Dr. Holman on 10/25/22, CKD 3, HTN, aspirin induced asthma, T2DM, HLD, and osteoporosis.     She now presents for the above procedure(s) with Orthopedic Surgery - Dr. Walker.    Previous Airway: 10/25/2022 7:43 AM  Performed by: Bella Perera CRNA  Authorized by: LORIE Merritt MD      Intubation:     Induction:  Rapid sequence induction    Intubated:  Postinduction    Mask Ventilation:  N/a    Attempts:  1    Attempted By:  CRNA    Method of Intubation:  Video laryngoscopy    Blade:  Duran 3    Laryngeal View Grade: Grade I - full view of cords      Difficult Airway Encountered?: No      Complications:  None    Airway Device:  Oral endotracheal tube    Airway Device Size:  7.0    Style/Cuff Inflation:  Cuffed (inflated to minimal occlusive pressure)    Tube secured:  21    Secured at:  The lips    Placement Verified By:  Capnometry    Complicating Factors:  None    Findings Post-Intubation:  BS equal bilateral and atraumatic/condition of teeth unchanged  Notes:      Head and neck neutral    LDA:       Peripheral IV - Single Lumen 03/30/24 2002 20 G Right Antecubital (Active)   Site Assessment Clean;Dry;Intact 03/30/24 2002   Line Status Blood return noted;Flushed;Saline locked 03/30/24 2002   Dressing Status Clean;Dry;Intact 03/30/24 2002   Number of days: 0            Urethral Catheter 03/30/24 2236 Straight-tip;Silicone 16 Fr. (Active)   Number of days: 0       Female External Urinary Catheter w/ Suction 03/30/24 2000  (Active)   Number of days: 0       Drips: None documented.        Patient Active Problem List   Diagnosis    Primary hypertension    Hyperlipidemia    Glaucoma    OA (osteoarthritis)    Pancreas cyst    Hypertension associated with diabetes    Senile osteoporosis    Spinal stenosis of lumbar region    Anterolisthesis    Lumbar spondylosis    Diastolic dysfunction    Asthma, moderate persistent    History of colon cancer    Impaired mobility    Type 2 diabetes mellitus with microalbuminuria, without long-term current use of insulin    Pancreatic insufficiency    Carcinoid bronchial adenoma, right    Other chronic pancreatitis    Atrial fibrillation, unspecified type    Aortic atherosclerosis    Diabetes mellitus with insulin therapy    Abdominal pain    Stage 3 chronic kidney disease    Balance problem    Closed 2-part intertrochanteric fracture of left femur    Debility       Review of patient's allergies indicates:   Allergen Reactions    Aspirin Other (See Comments)     Asthma    TRIAD OF NASAL POLYPS, ASA  ALLERGY AND ASTHMA.        Aspirin Other (See Comments)    Nsaids (non-steroidal anti-inflammatory drug) Other (See Comments)     AVOID DUE TO TRIAD OF ASA ALLERGY, NASAL POLYPS,AND ASTHMA  AVOID DUE TO TRIAD OF ASA ALLERGY, NASAL POLYPS,AND ASTHMA    Penicillin      Other reaction(s): Rash    9/30/20: tolerates ceftriaxone    Penicillin g Other (See Comments)     Other reaction(s): Rash    9/30/20: tolerates ceftriaxone       Current Inpatient Medications:   acetaminophen  1,000 mg Oral Once    [START ON 3/31/2024] albuterol sulfate  2.5 mg Nebulization BID WAKE    atorvastatin  20 mg Oral QHS    cetirizine  5 mg Oral QHS    [START ON 3/31/2024] fluticasone furoate-vilanteroL  1 puff Inhalation Daily    And    [START ON 3/31/2024] tiotropium bromide  2 puff Inhalation Daily    gabapentin  100 mg Oral BID    [START ON 3/31/2024] insulin aspart U-100  3 Units Subcutaneous  TIDWM    insulin detemir U-100  6 Units Subcutaneous Once    latanoprost  1 drop Both Eyes QHS    [START ON 3/31/2024] lipase-protease-amylase 24,000-76,000-120,000 units  2 capsule Oral TID WM    [START ON 3/31/2024] losartan  50 mg Oral After dinner    [START ON 3/31/2024] magnesium oxide  400 mg Oral Daily    montelukast  10 mg Oral QHS    [START ON 3/31/2024] vitamin D  1,000 Units Oral Daily       Current Outpatient Medications   Medication Instructions    ACCU-CHEK GUIDE TEST STRIPS Strp TEST FOUR TIMES DAILY WITH MEALS AND NIGHTLY    albuterol (PROVENTIL) 2.5 mg, Nebulization, Every 6 hours PRN, Rescue    atorvastatin (LIPITOR) 20 mg, Oral, Nightly    budesonide 1 mg/2 mL NbSp SMARTSI Vial(s) Both Nares Twice Daily    chlorthalidone (HYGROTEN) 25 mg, Oral, Daily    diclofenac sodium (VOLTAREN) 2 g, Topical (Top), 4 times daily    fluticasone-umeclidin-vilanter (TRELEGY ELLIPTA) 200-62.5-25 mcg inhaler 1 puff, Inhalation, Daily    gabapentin (NEURONTIN) 100 mg, Oral, 3 times daily    insulin aspart U-100 (NOVOLOG FLEXPEN U-100 INSULIN) 100 unit/mL (3 mL) InPn pen Inject 4 Units into the skin before breakfast AND 5 Units daily with lunch AND 4 Units daily with dinner or evening meal. Plus correction scale. Max TDD 30 units..    insulin detemir U-100, Levemir, (LEVEMIR FLEXTOUCH U100 INSULIN) 100 unit/mL (3 mL) InPn pen Inject 6 Units into the skin once daily AND 5 Units every evening.    irbesartan (AVAPRO) 300 mg, Oral, Nightly    lancets (ACCU-CHEK FASTCLIX LANCET DRUM) [facility] [place] BLOOD GLUCOSE FOUR TIMES DAILY    latanoprost 0.005 % ophthalmic solution Inject into the eye. 1 Drops Ophthalmic Every evening    levocetirizine (XYZAL) 5 mg, Oral, Nightly    LIDOcaine HCL 3 % Crea 1 application , Topical (Top), 2 times daily, Apply to affected area    lipase-protease-amylase 24,000-76,000-120,000 units (CREON) 24,000-76,000 -120,000 unit capsule Take 2 capsules with meals orally and 1  "capsule with snacks.    magnesium oxide (MAG-OX) 400 mg, Oral, Daily    montelukast (SINGULAIR) 10 mg, Oral, Nightly    NEILMED SINUS RINSE REFILL Pack use as directed    olopatadine (PATANOL) 0.1 % ophthalmic solution 1 drop, Both Eyes, 2 times daily PRN, 1 Drops Ophthalmic Twice a day     pen needle, diabetic 31 gauge x 5/16" Ndle Use with insulin pen 5 times a day    PROLIA 60 mg, Subcutaneous, Every 6 months    vitamin D (VITAMIN D3) 1,000 Units, Oral, Daily       Past Surgical History:   Procedure Laterality Date    BRONCHOSCOPY N/A 5/13/2022    Procedure: Bronchoscopy;  Surgeon: [facility] Diagnostic Provider;  Location: [facility] OR [address] [place] [address];  Service: Anesthesiology;  Laterality: N/A;    CATARACT EXTRACTION, BILATERAL      CHOLECYSTECTOMY      COLECTOMY, RIGHT Right 10/25/2022    Procedure: COLECTOMY, RIGHT;  Surgeon: Jass Holman MD;  [facility];  Service: Colon and Rectal;  Laterality: Right;    COLONOSCOPY N/A 6/26/2023    Procedure: COLONOSCOPY;  Surgeon: Jass Holman MD;  [facility]  Service: Endoscopy;  Laterality: N/A;  2nd floor -lung disease  referral Dr MartinezOgocwab-uydh-bmawn portal/mailed-GT  6/20/23- Precall confirmed- [place]    ENDOSCOPIC ULTRASOUND OF UPPER GASTROINTESTINAL TRACT N/A 4/22/2022    Procedure: ULTRASOUND, UPPER GI TRACT, ENDOSCOPIC;  Surgeon: Max Rosado MD;  Location: [facility] ([address] [place] [address]);  Service: Endoscopy;  Laterality: N/A;  urgent EUS (linear) for abnormal CT scan with dilated PD and increased cyst of pancreas cyst.  pap 44 mmHg  4/13:fully vaccinated. instructions via portal-SC    EPIDURAL STEROID INJECTION INTO LUMBAR SPINE N/A 11/8/2018    Procedure: Injection-steroid-epidural-lumbar L5-S1;  Surgeon: Nicci Osorio Jr., MD;  Location: [facility]IN [facility];  Service: Pain Management;  Laterality: N/A;    FUNCTIONAL ENDOSCOPIC SINUS SURGERY (FESS) USING COMPUTER-ASSISTED NAVIGATION N/A 6/17/2019    Procedure: FESS, USING " COMPUTER-ASSISTED NAVIGATION;  Surgeon: Rosangela Isabel MD;  Location: [facility];  Service: ENT;  Laterality: N/A;  video    HYSTERECTOMY      nasal polyps         Social History     Substance and Sexual Activity   Drug Use No     Tobacco Use: Low Risk  (3/24/2024)    Patient History     Smoking Tobacco Use: Never     Smokeless Tobacco Use: Never     Passive Exposure: Never     Alcohol Use: Patient Declined (1/10/2024)    AUDIT-C     Frequency of Alcohol Consumption: Patient declined     Average Number of Drinks: Patient declined     Frequency of Binge Drinking: Patient declined       OBJECTIVE:     Vital Signs Range (Last 24H):  Temp:  [36.7 °C (98 °F)]   Pulse:  [87-99]   Resp:  [16-26]   BP: (139-218)/(64-94)   SpO2:  [97 %-100 %]       Significant Labs    Heme Profile  Lab Results   Component Value Date    WBC 8.50 03/30/2024    HGB 9.9 (L) 03/30/2024    HCT 30.4 (L) 03/30/2024     03/30/2024       Coagulation Studies  Lab Results   Component Value Date    LABPROT 68.1 (H) 03/30/2024    INR 7.2 (HH) 03/30/2024    APTT 56.6 (H) 03/30/2024       BMP  Lab Results   Component Value Date     03/30/2024    K 4.8 03/30/2024     (H) 03/30/2024    CO2 18 (L) 03/30/2024    BUN 36 (H) 03/30/2024    CREATININE 2.1 (H) 03/30/2024    MG 1.3 (L) 03/30/2024    PHOS 3.1 03/30/2024       Liver Function Tests  Lab Results   Component Value Date    AST 24 03/30/2024    ALT 34 03/30/2024    ALKPHOS 50 (L) 03/30/2024    BILITOT 0.3 03/30/2024    PROT 6.0 03/30/2024    ALBUMIN 3.3 (L) 03/30/2024       Lipid Profile  Lab Results   Component Value Date    CHOL 124 09/19/2023    HDL 53 09/19/2023    TRIG 41 09/19/2023       Endocrine Profile  Lab Results   Component Value Date    HGBA1C 7.4 (H) 03/30/2024    TSH 3.743 09/19/2023       Diagnostic Studies    Cardiac Studies    EKG:   Results for orders placed or performed during the hospital encounter of 11/18/23   EKG 12-lead    Collection Time: 11/18/23   3:55 PM    Narrative    Test Reason : R05.9,    Vent. Rate : 095 BPM     Atrial Rate : 095 BPM     P-R Int : 176 ms          QRS Dur : 116 ms      QT Int : 362 ms       P-R-T Axes : 000 085 097 degrees     QTc Int : 454 ms    Sinus rhythm with Premature supraventricular complexes  Incomplete right bundle branch block  Abnormal ECG  When compared with ECG of 18-NOV-2023 15:19,  QRS duration is slightly less otherwise no significant change  Confirmed by BENTLEY MORRIS MD ([id number]) on 11/19/2023 11:07:51 AM    Referred By: EDGARDO   SELF           Confirmed By:BENTLEY MORRIS MD       TTE:  Results for orders placed during the hospital encounter of 07/05/22    Echo    Interpretation Summary  · The left ventricle is normal in size with concentric remodeling and normal systolic function.  · The estimated ejection fraction is 60%.  · Indeterminate left ventricular diastolic function.  · Normal right ventricular size with normal right ventricular systolic function.  · There is mild mitral stenosis. Sclerosis and thickening extends in to the aortomitral curtain.  · The mean diastolic gradient across the mitral valve is 4 mmHg at a heart rate of 87 bpm.  · Normal central venous pressure (3 mmHg).  · The estimated PA systolic pressure is 26 mmHg.  · Small posterior pericardial effusion.      ASSESSMENT/PLAN:     Pre-op Assessment    I have reviewed the Patient Summary Reports.     I have reviewed the Nursing Notes. I have reviewed the NPO Status.   I have reviewed the Medications.     Review of Systems  Anesthesia Hx:  No problems with previous Anesthesia                Social:  Non-Smoker       Hematology/Oncology:  Hematology Normal   Oncology Normal                                   EENT/Dental:  EENT/Dental Normal           Cardiovascular:     Hypertension                                        Pulmonary:    Asthma                    Renal/:  Chronic Renal Disease, CKD                Hepatic/GI:  Hepatic/GI Normal                  Musculoskeletal:  Arthritis               Neurological:  Neurology Normal                                      Endocrine:  Diabetes, type 2           Psych:  Psychiatric Normal                  Physical Exam  General: Well nourished, Cooperative, Oriented and Alert    Airway:  Mallampati: II / II  Mouth Opening: Normal  TM Distance: Normal  Tongue: Normal  Neck ROM: Normal ROM    Dental:  Intact  Few missing molars      Anesthesia Plan  Type of Anesthesia, risks & benefits discussed:    Anesthesia Type: Gen ETT, Regional  Intra-op Monitoring Plan: Standard ASA Monitors  Post Op Pain Control Plan: multimodal analgesia and IV/PO Opioids PRN  Induction:  IV  Airway Plan: Direct, Post-Induction  Informed Consent: Informed consent signed with the Patient and all parties understand the risks and agree with anesthesia plan.  All questions answered. Patient consented to blood products? Yes  ASA Score: 3  Day of Surgery Review of History & Physical: H&P Update referred to the surgeon/provider.    Ready For Surgery From Anesthesia Perspective.     .

## 2024-03-31 NOTE — ASSESSMENT & PLAN NOTE
She presents with uncontrolled hyperglycemia, no anion gap VBG demonstrates stable PH no ketonemia.  Her serum osmolality is 336, no altered mental status.  And based on calculating osmolality, gap is 14 within normal range.  no HHS at this time no other acute symptoms of hyperglycemic crisis.  -given 6 u of levemir on admit with glucose still 300s on 3/31 AM. Will adjust to 9 U pre op and titrate further post op in PM. Will f/u post op glucose but do at least 6 U of novolog with meals possibly more pending trend. Anesthesia asked to avoid steroids intra-op for intubation and appears did not receive.  -reports at home has some lows to 80s and some highs to 300s but never 500s  -A1c 7.4  -DM diet

## 2024-03-31 NOTE — PROGRESS NOTES
Nurses Note -- 4 Eyes      3/31/2024   12:53 AM      Skin assessed during: Admit      [] No Altered Skin Integrity Present    []Prevention Measures Documented      [x] Yes- Altered Skin Integrity Present or Discovered   [x] LDA Added if Not in Epic (Describe Wound)   [] New Altered Skin Integrity was Present on Admit and Documented in LDA   [] Wound Image Taken    Wound Care Consulted? Yes    Attending Nurse:  Thien Arrieta RN/Staff Member:  GINA Osborn

## 2024-03-31 NOTE — ASSESSMENT & PLAN NOTE
-sustained in mechanical fall and IT fx found on admit.  Ortho with OR 3/31 with dr garcia with IM nail with WBAT post op with PT/OT on POD 1  -multimodals for pain control with anesthesia managing with ropivicaine placed  -bowel regimen with large stool burden seen on CT on admit  -vit D mildly low at 27 and replacement started  -ca out on POD 1  -eliquis for 35 days post op for dvt ppx with end date 4/29

## 2024-03-31 NOTE — CARE UPDATE
RAPID RESPONSE NURSE ROUND       Rounding completed with charge RNKristian for hgb drop reports RN to reach out to team, VSS. No additional concerns verbalized at this time. Instructed to call [phone] for further concerns or assistance.

## 2024-03-31 NOTE — PLAN OF CARE
SW met with patient at bedside regarding CM consult for placement.  SW discussed inpatient rehab vs Skilled Nursing Facility Placement vs home with home health.  Patient pending PT/OT recommendations at this time.  If IPR is recommended [facility] 1st choice.  If SNF is recommended [facility] 1st choice.  If Home with Home Health [facility] choice.      RICKY sent patient information to [facility] via frintit system for review.       03/31/24 0657   Post-Acute Status   Post-Acute Authorization Placement   Post-Acute Placement Status Referrals Sent   Patient choice form signed by patient/caregiver List with quality metrics by geographic area provided

## 2024-03-31 NOTE — ASSESSMENT & PLAN NOTE
-when patient is eating diet again -resume home dosage of Creon 120, @ 2 tabs with meals and 1 prn with snacks

## 2024-03-31 NOTE — PROGRESS NOTES
[facility]  Orthopedics  Progress Note    Patient Name: Sussy Costa  MRN: [id number]  Admission Date: 3/30/2024  Hospital Length of Stay: 1 days  Attending Provider: Glenny Layton MD  Primary Care Provider: PAULA Casillas MD  Follow-up For: Procedure(s) (LRB):  INSERTION, INTRAMEDULLARY CAYDEN, FEMUR - Left, Eva, C-arm clockside, [place] Table (Left)    Post-Operative Day: Day of Surgery  Subjective:     Principal Problem:Closed 2-part intertrochanteric fracture of left femur    Principal Orthopedic Problem:  As above    Interval History: NAEON. AF, hypertensive to 197/92, but otherwise hemodynamically stable.  Patient reports that her pain is well-controlled until she moves of the leg.  NPO since midnight.  Verbalizes her understanding of today's surgical plan and agrees to proceed to the OR for insertion of intramedullary nail to the left femur.    Review of patient's allergies indicates:   Allergen Reactions    Aspirin Other (See Comments)     Asthma    TRIAD OF NASAL POLYPS, ASA  ALLERGY AND ASTHMA.        Aspirin Other (See Comments)    Nsaids (non-steroidal anti-inflammatory drug) Other (See Comments)     AVOID DUE TO TRIAD OF ASA ALLERGY, NASAL POLYPS,AND ASTHMA  AVOID DUE TO TRIAD OF ASA ALLERGY, NASAL POLYPS,AND ASTHMA    Penicillin      Other reaction(s): Rash    9/30/20: tolerates ceftriaxone    Penicillin g Other (See Comments)     Other reaction(s): Rash    9/30/20: tolerates ceftriaxone       Current Facility-Administered Medications   Medication    0.9%  NaCl infusion (for blood administration)    0.9%  NaCl infusion    acetaminophen tablet 1,000 mg    albuterol sulfate nebulizer solution 2.5 mg    albuterol sulfate nebulizer solution 2.5 mg    ascorbic acid (vitamin C) tablet 500 mg    atorvastatin tablet 20 mg    bisacodyL suppository 10 mg    cetirizine tablet 5 mg    dextrose 10% bolus 125 mL 125 mL    dextrose 10% bolus 250 mL 250 mL    fluticasone furoate-vilanteroL 200-25  "mcg/dose diskus inhaler 1 puff    And    tiotropium bromide 2.5 mcg/actuation inhaler 2 puff    gabapentin capsule 100 mg    glucagon (human recombinant) injection 1 mg    HYDROmorphone injection 0.5 mg    insulin aspart U-100 pen 0-5 Units    insulin aspart U-100 pen 3 Units    insulin detemir U-100 (Levemir) pen 5 Units    latanoprost 0.005 % ophthalmic solution 1 drop    lipase-protease-amylase 24,000-76,000-120,000 units capsule 1 capsule    lipase-protease-amylase 24,000-76,000-120,000 units capsule 2 capsule    losartan tablet 50 mg    magnesium oxide tablet 400 mg    magnesium sulfate 2g in water 50mL IVPB (premix)    melatonin tablet 6 mg    methocarbamoL tablet 500 mg    montelukast tablet 10 mg    olopatadine 0.1 % ophthalmic solution 1 drop    ondansetron injection 4 mg    oxyCODONE immediate release tablet 5 mg    sodium chloride 0.9% flush 10 mL    tuberculin injection 5 Units    vitamin D 1000 units tablet 1,000 Units     Objective:     Vital Signs (Most Recent):  Temp: 98.1 °F (36.7 °C) (03/31/24 0423)  Pulse: 82 (03/31/24 0423)  Resp: 18 (03/31/24 0423)  BP: (!) 145/70 (03/31/24 0423)  SpO2: 96 % (03/31/24 0423) Vital Signs (24h Range):  Temp:  [98 °F (36.7 °C)-98.1 °F (36.7 °C)] 98.1 °F (36.7 °C)  Pulse:  [] 82  Resp:  [16-26] 18  SpO2:  [96 %-100 %] 96 %  BP: (139-218)/(64-94) 145/70     Weight: 42 kg (92 lb 9.5 oz)  Height: 5' 5" (165.1 cm)  Body mass index is 15.41 kg/m².      Intake/Output Summary (Last 24 hours) at 3/31/2024 0698  Last data filed at 3/31/2024 0633  Gross per 24 hour   Intake 230.92 ml   Output --   Net 230.92 ml        Ortho/SPM Exam  NAD, resting comfortably in bed  A&O x3   Breathing comfortably w/o distress   Extremities WWP      DP and PT pulses palpated   Skin is intact throughout   TA/EHL/FHL/GSC isolated without deficit  Sensation intact to light touch in the sural, saphenous, DPN, SPN, tibial nerve distributions  Compartments are soft and compressible.        "   Significant Labs: A1C:   Recent Labs   Lab 01/03/24  1340 03/30/24 2035   HGBA1C 7.9* 7.4*     BMP:   Recent Labs   Lab 03/31/24  0533   *      K 4.1   *   CO2 20*   BUN 34*   CREATININE 1.7*   CALCIUM 8.5*   MG 1.3*     CBC:   Recent Labs   Lab 03/30/24  1936/24  0533   WBC 8.50 12.56   HGB 9.9* 7.7*   HCT 30.4* 23.7*    151     Coagulation:   Recent Labs   Lab 03/30/24 2035 03/30/24 2345 03/31/24  0533   LABPROT 68.1* 11.9 12.1   INR 7.2* 1.1 1.1   APTT 56.6*  --   --      All pertinent labs within the past 24 hours have been reviewed.    Significant Imaging: I have reviewed and interpreted all pertinent imaging results/findings.  Assessment/Plan:     * Closed 2-part intertrochanteric fracture of left femur  Sussy Costa is a 88 y.o. female with a left intertrochanteric femur fracture, closed, NVI. They take no anticoagulation at home. They required no ambulatory assistive devices prior to this injury.     -To OR for IMN left femur this morning  -NPO for surgery today  -AM Hgb 7.7, 2 units of pRBC's held   -DVT PPx: Hold anticoagulation  -Abx: Preop abx ordered  -Labs: Reviewed; Mag 1.3, PT/INR 12.1 and 1.1, respectively, AM Hgb 7.7, Bun/Cr 34 and 1.7 respectively          ARIANNE Guido MD  Orthopedics  [facility] - Surgery

## 2024-03-31 NOTE — ASSESSMENT & PLAN NOTE
Patient with Paroxysmal (<7 days) atrial fibrillation which is controlled currently with  on no direct rate or rhythm control . Patient is currently in sinus rhythm.[id number] Score: 4. . Anticoagulation not indicated due to not on as outpatient, not indicated to start now .

## 2024-03-31 NOTE — HPI
Sussy Costa is a 88 y.o. female with PMH of adenocarcinoma of the right colon complicated by large bowel obstruction s/p right colectomy with Dr. Holman on 10/25/22, asthma, T2DM, HLD, HTN, and osteoporosis presenting with left hip pain. Patient was walking in her kitchen and fell onto the left hip. Immediate pain and difficulty bearing weight. Denies head injury or LOC. On no blood thinners. Denies other MSK pains. Denies numbness, tingling, or paresthesias to the LLE. Lives with her  and has no stairs at home. Uses no assistive device for ambulation at baseline.  The patient reports that she has some mild pain to the left wrist as well as a superficial laceration to the dorsum of the left hand.  She denies other MSK pains.    Nonsmoker   No history of chemotherapy or radiation   No history of fracture, dislocation, or other orthopedic injuries to the left lower extremity.    Not on any anticoagulation at baseline.    Does not use any assistive devices for ambulation at baseline.

## 2024-03-31 NOTE — PROGRESS NOTES
Pharmacist Renal Dose Adjustment Note    Sussy Costa is a 88 y.o. female being treated with the medication cefazolin    Patient Data:    Vital Signs (Most Recent):  Temp: 98 °F (36.7 °C) (03/31/24 0742)  Pulse: 83 (03/31/24 0742)  Resp: (!) 22 (03/31/24 1125)  BP: (!) 112/59 (03/31/24 0742)  SpO2: 100 % (03/31/24 0742) Vital Signs (72h Range):  Temp:  [98 °F (36.7 °C)-98.1 °F (36.7 °C)]   Pulse:  []   Resp:  [16-26]   BP: (112-218)/(59-94)   SpO2:  [96 %-100 %]      Recent Labs   Lab 03/30/24  1936/24  0533   CREATININE 2.1* 1.7*     Serum creatinine: 1.7 mg/dL (H) 03/31/24 0533  Estimated creatinine clearance: 15.5 mL/min (A)    Medication:Cefazolin 2g IV q8h will be adjusted to 2g IV q12h per pt's renal function    Pharmacist's Name: Kyle Sloan, CarlosD

## 2024-03-31 NOTE — ASSESSMENT & PLAN NOTE
Chronic, controlled. Latest blood pressure and vitals reviewed-   Home meds for hypertension were reviewed and noted below.   Hypertension Medications               chlorthalidone (HYGROTEN) 25 MG Tab Take 1 tablet (25 mg total) by mouth once daily.    irbesartan (AVAPRO) 300 MG tablet Take 1 tablet (300 mg total) by mouth every evening.            While in the hospital, will manage blood pressure as follows; Adjust home antihypertensive regimen as follows- Use dose adjusted Losartan 50mg given slightly elevated creatinine,  equivalent of home dose irbesartan is losartan 100mg, titrate as needed. Hold chlorthalidone tomorrow, resume if renal function remains stable

## 2024-03-31 NOTE — PROGRESS NOTES
Noted a 2ml of blood stained fluid from the perineural catheter. Informed anesthesia, MD Arteaga responded to bedside and checked the catheter. MD confirmed the perineural catheter is okay and we can start the ropivacaine infusion.

## 2024-03-31 NOTE — ASSESSMENT & PLAN NOTE
Patient with Paroxysmal (<7 days) atrial fibrillation which is controlled currently with  on no direct rate or rhythm control . Patient is currently in sinus rhythm.[id number] Score: 4. . Anticoagulation not indicated due to not on as outpatient, not indicated to start now .for this reason but is indicated for dvt ppx post op for 35 days for post hip fx

## 2024-03-31 NOTE — ASSESSMENT & PLAN NOTE
She presents with uncontrolled hyperglycemia, no anion gap VBG demonstrates stable PH no ketonemia.  Her serum osmolality is 336, no altered mental status.  And based on calculating osmolality, gap is 14 within normal range.  Do not believe she has HHS at this time no other acute symptoms of hyperglycemic crisis.  -she received 5 units IV regular insulin in the ED and glucose was down to 294  -give 6 units of Levemir tonight and then will continue patient on Levemir 5 units b.i.d. low-dose insulin sliding scale and when she is eating prandial insulin 3 units with meals.      A1c is not severely uncontrolled we might expect with that he hyperglycemic crisis  -at this time believe patient is okay for a med tele unit and will not require insulin drip

## 2024-04-01 PROBLEM — E43 SEVERE PROTEIN-CALORIE MALNUTRITION: Status: ACTIVE | Noted: 2022-07-06

## 2024-04-01 PROBLEM — S61.412A SKIN TEAR OF HAND WITHOUT COMPLICATION, LEFT, INITIAL ENCOUNTER: Status: ACTIVE | Noted: 2024-04-01

## 2024-04-01 PROBLEM — D69.6 THROMBOCYTOPENIA: Status: ACTIVE | Noted: 2024-04-01

## 2024-04-01 LAB
ANION GAP SERPL CALC-SCNC: 5 MMOL/L (ref 8–16)
BASOPHILS # BLD AUTO: 0.03 K/UL (ref 0–0.2)
BASOPHILS NFR BLD: 0.3 % (ref 0–1.9)
BUN SERPL-MCNC: 27 MG/DL (ref 8–23)
CALCIUM SERPL-MCNC: 7.2 MG/DL (ref 8.7–10.5)
CHLORIDE SERPL-SCNC: 112 MMOL/L (ref 95–110)
CO2 SERPL-SCNC: 19 MMOL/L (ref 23–29)
CREAT SERPL-MCNC: 1.6 MG/DL (ref 0.5–1.4)
DIFFERENTIAL METHOD BLD: ABNORMAL
EOSINOPHIL # BLD AUTO: 0.1 K/UL (ref 0–0.5)
EOSINOPHIL NFR BLD: 0.6 % (ref 0–8)
ERYTHROCYTE [DISTWIDTH] IN BLOOD BY AUTOMATED COUNT: 15.4 % (ref 11.5–14.5)
EST. GFR  (NO RACE VARIABLE): 31 ML/MIN/1.73 M^2
GLUCOSE SERPL-MCNC: 87 MG/DL (ref 70–110)
HCT VFR BLD AUTO: 24.8 % (ref 37–48.5)
HGB BLD-MCNC: 8.2 G/DL (ref 12–16)
IMM GRANULOCYTES # BLD AUTO: 0.06 K/UL (ref 0–0.04)
IMM GRANULOCYTES NFR BLD AUTO: 0.6 % (ref 0–0.5)
LYMPHOCYTES # BLD AUTO: 1.8 K/UL (ref 1–4.8)
LYMPHOCYTES NFR BLD: 16.8 % (ref 18–48)
MAGNESIUM SERPL-MCNC: 1.7 MG/DL (ref 1.6–2.6)
MCH RBC QN AUTO: 32.2 PG (ref 27–31)
MCHC RBC AUTO-ENTMCNC: 33.1 G/DL (ref 32–36)
MCV RBC AUTO: 97 FL (ref 82–98)
MONOCYTES # BLD AUTO: 1 K/UL (ref 0.3–1)
MONOCYTES NFR BLD: 9.5 % (ref 4–15)
NEUTROPHILS # BLD AUTO: 7.8 K/UL (ref 1.8–7.7)
NEUTROPHILS NFR BLD: 72.2 % (ref 38–73)
NRBC BLD-RTO: 0 /100 WBC
PHOSPHATE SERPL-MCNC: 3.6 MG/DL (ref 2.7–4.5)
PLATELET # BLD AUTO: 105 K/UL (ref 150–450)
PMV BLD AUTO: 12.7 FL (ref 9.2–12.9)
POCT GLUCOSE: 114 MG/DL (ref 70–110)
POCT GLUCOSE: 138 MG/DL (ref 70–110)
POCT GLUCOSE: 210 MG/DL (ref 70–110)
POCT GLUCOSE: 217 MG/DL (ref 70–110)
POCT GLUCOSE: 88 MG/DL (ref 70–110)
POTASSIUM SERPL-SCNC: 3.6 MMOL/L (ref 3.5–5.1)
RBC # BLD AUTO: 2.55 M/UL (ref 4–5.4)
SODIUM SERPL-SCNC: 136 MMOL/L (ref 136–145)
WBC # BLD AUTO: 10.84 K/UL (ref 3.9–12.7)

## 2024-04-01 PROCEDURE — 21400001 HC TELEMETRY ROOM

## 2024-04-01 PROCEDURE — 97161 PT EVAL LOW COMPLEX 20 MIN: CPT

## 2024-04-01 PROCEDURE — 63600175 PHARM REV CODE 636 W HCPCS: Performed by: HOSPITALIST

## 2024-04-01 PROCEDURE — 25000242 PHARM REV CODE 250 ALT 637 W/ HCPCS: Performed by: HOSPITALIST

## 2024-04-01 PROCEDURE — 85025 COMPLETE CBC W/AUTO DIFF WBC: CPT | Performed by: HOSPITALIST

## 2024-04-01 PROCEDURE — 25000003 PHARM REV CODE 250: Performed by: HOSPITALIST

## 2024-04-01 PROCEDURE — 25000003 PHARM REV CODE 250: Performed by: STUDENT IN AN ORGANIZED HEALTH CARE EDUCATION/TRAINING PROGRAM

## 2024-04-01 PROCEDURE — 97530 THERAPEUTIC ACTIVITIES: CPT

## 2024-04-01 PROCEDURE — 84100 ASSAY OF PHOSPHORUS: CPT | Performed by: HOSPITALIST

## 2024-04-01 PROCEDURE — 97116 GAIT TRAINING THERAPY: CPT

## 2024-04-01 PROCEDURE — 83735 ASSAY OF MAGNESIUM: CPT | Performed by: HOSPITALIST

## 2024-04-01 PROCEDURE — 36415 COLL VENOUS BLD VENIPUNCTURE: CPT | Performed by: HOSPITALIST

## 2024-04-01 PROCEDURE — 99231 SBSQ HOSP IP/OBS SF/LOW 25: CPT | Mod: GC,,, | Performed by: ANESTHESIOLOGY

## 2024-04-01 PROCEDURE — 94640 AIRWAY INHALATION TREATMENT: CPT

## 2024-04-01 PROCEDURE — 97165 OT EVAL LOW COMPLEX 30 MIN: CPT

## 2024-04-01 PROCEDURE — 63600175 PHARM REV CODE 636 W HCPCS: Performed by: STUDENT IN AN ORGANIZED HEALTH CARE EDUCATION/TRAINING PROGRAM

## 2024-04-01 PROCEDURE — 99222 1ST HOSP IP/OBS MODERATE 55: CPT | Mod: ,,, | Performed by: NURSE PRACTITIONER

## 2024-04-01 PROCEDURE — 80048 BASIC METABOLIC PNL TOTAL CA: CPT | Performed by: HOSPITALIST

## 2024-04-01 RX ORDER — CALCIUM CARBONATE 200(500)MG
500 TABLET,CHEWABLE ORAL 3 TIMES DAILY PRN
Status: DISCONTINUED | OUTPATIENT
Start: 2024-04-01 | End: 2024-04-03 | Stop reason: HOSPADM

## 2024-04-01 RX ORDER — INSULIN ASPART 100 [IU]/ML
5 INJECTION, SOLUTION INTRAVENOUS; SUBCUTANEOUS
Status: DISCONTINUED | OUTPATIENT
Start: 2024-04-01 | End: 2024-04-01

## 2024-04-01 RX ORDER — INSULIN ASPART 100 [IU]/ML
6 INJECTION, SOLUTION INTRAVENOUS; SUBCUTANEOUS
Status: DISCONTINUED | OUTPATIENT
Start: 2024-04-01 | End: 2024-04-02

## 2024-04-01 RX ADMIN — MUPIROCIN 1 G: 20 OINTMENT TOPICAL at 09:04

## 2024-04-01 RX ADMIN — ACETAMINOPHEN 1000 MG: 500 TABLET ORAL at 12:04

## 2024-04-01 RX ADMIN — ACETAMINOPHEN 1000 MG: 500 TABLET ORAL at 05:04

## 2024-04-01 RX ADMIN — MUPIROCIN 1 G: 20 OINTMENT TOPICAL at 08:04

## 2024-04-01 RX ADMIN — PANCRELIPASE 2 CAPSULE: 24000; 76000; 120000 CAPSULE, DELAYED RELEASE PELLETS ORAL at 05:04

## 2024-04-01 RX ADMIN — LATANOPROST 1 DROP: 50 SOLUTION OPHTHALMIC at 09:04

## 2024-04-01 RX ADMIN — Medication 6 MG: at 09:04

## 2024-04-01 RX ADMIN — INSULIN ASPART 5 UNITS: 100 INJECTION, SOLUTION INTRAVENOUS; SUBCUTANEOUS at 08:04

## 2024-04-01 RX ADMIN — DOCUSATE SODIUM AND SENNOSIDES 1 TABLET: 8.6; 5 TABLET, FILM COATED ORAL at 08:04

## 2024-04-01 RX ADMIN — FLUTICASONE FUROATE AND VILANTEROL TRIFENATATE 1 PUFF: 200; 25 POWDER RESPIRATORY (INHALATION) at 02:04

## 2024-04-01 RX ADMIN — METHOCARBAMOL 500 MG: 500 TABLET ORAL at 09:04

## 2024-04-01 RX ADMIN — Medication 400 MG: at 08:04

## 2024-04-01 RX ADMIN — INSULIN ASPART 6 UNITS: 100 INJECTION, SOLUTION INTRAVENOUS; SUBCUTANEOUS at 05:04

## 2024-04-01 RX ADMIN — PANCRELIPASE 2 CAPSULE: 24000; 76000; 120000 CAPSULE, DELAYED RELEASE PELLETS ORAL at 08:04

## 2024-04-01 RX ADMIN — OXYCODONE HYDROCHLORIDE AND ACETAMINOPHEN 500 MG: 500 TABLET ORAL at 08:04

## 2024-04-01 RX ADMIN — APIXABAN 2.5 MG: 2.5 TABLET, FILM COATED ORAL at 08:04

## 2024-04-01 RX ADMIN — APIXABAN 2.5 MG: 2.5 TABLET, FILM COATED ORAL at 09:04

## 2024-04-01 RX ADMIN — CEFAZOLIN 2 G: 2 INJECTION, POWDER, FOR SOLUTION INTRAMUSCULAR; INTRAVENOUS at 09:04

## 2024-04-01 RX ADMIN — PANCRELIPASE 2 CAPSULE: 24000; 76000; 120000 CAPSULE, DELAYED RELEASE PELLETS ORAL at 12:04

## 2024-04-01 RX ADMIN — ACETAMINOPHEN 1000 MG: 500 TABLET ORAL at 06:04

## 2024-04-01 RX ADMIN — CALCIUM CARBONATE (ANTACID) CHEW TAB 500 MG 500 MG: 500 CHEW TAB at 09:04

## 2024-04-01 RX ADMIN — CEFAZOLIN 2 G: 2 INJECTION, POWDER, FOR SOLUTION INTRAMUSCULAR; INTRAVENOUS at 08:04

## 2024-04-01 RX ADMIN — ROPIVACAINE HYDROCHLORIDE 0.1 ML/HR: 2 INJECTION, SOLUTION EPIDURAL; INFILTRATION at 12:04

## 2024-04-01 RX ADMIN — ATORVASTATIN CALCIUM 20 MG: 20 TABLET, FILM COATED ORAL at 09:04

## 2024-04-01 RX ADMIN — Medication 1000 UNITS: at 08:04

## 2024-04-01 RX ADMIN — POLYETHYLENE GLYCOL 3350 17 G: 17 POWDER, FOR SOLUTION ORAL at 08:04

## 2024-04-01 RX ADMIN — INSULIN ASPART 4 UNITS: 100 INJECTION, SOLUTION INTRAVENOUS; SUBCUTANEOUS at 05:04

## 2024-04-01 RX ADMIN — INSULIN ASPART 5 UNITS: 100 INJECTION, SOLUTION INTRAVENOUS; SUBCUTANEOUS at 12:04

## 2024-04-01 RX ADMIN — CETIRIZINE HYDROCHLORIDE 5 MG: 5 TABLET, FILM COATED ORAL at 09:04

## 2024-04-01 RX ADMIN — TIOTROPIUM BROMIDE INHALATION SPRAY 2 PUFF: 3.12 SPRAY, METERED RESPIRATORY (INHALATION) at 02:04

## 2024-04-01 RX ADMIN — MONTELUKAST 10 MG: 10 TABLET, FILM COATED ORAL at 09:04

## 2024-04-01 RX ADMIN — DOCUSATE SODIUM AND SENNOSIDES 1 TABLET: 8.6; 5 TABLET, FILM COATED ORAL at 09:04

## 2024-04-01 NOTE — SUBJECTIVE & OBJECTIVE
Interval history- in good spirits today and eating breakfast. Glucose dc to 80 last night and this AM after receiving 10 U short acting last night and 9 U levemir in AM so held long acting last night, this Am will give 5 short acting and long acting pending glucose after breakfast as long as over 120 (nursing reports was 130s so given) and titrate further given was 500s on admit was on higher than home dose but seems to be levling out now. She reports never sees that high of glucose at home so possibly acute stressor lead to highs with fx on admit. Bp was very high on admit also in 200s and then dec last afternoon to 90s systolic, bp meds were held and did not receive post op and improved after flud bolus and 1 U PRBCS post op for hg of 6.9 with improved hg of8.2  now and stable this Am. Cr improved from 2.1 on admit to 1.6 now within baselien ranges and updated her on this as well. She reports a little pain this morning but she let pain team know as managing pain with ropivicaine in place. Plan to trend closely glucose today given lability since admit and titrate further pending trends and f/u PT/OT recs. PM and R consulted to assess rehab candidacy.      Review of patient's allergies indicates:   Allergen Reactions    Aspirin Other (See Comments)     Asthma    TRIAD OF NASAL POLYPS, ASA  ALLERGY AND ASTHMA.        Aspirin Other (See Comments)    Nsaids (non-steroidal anti-inflammatory drug) Other (See Comments)     AVOID DUE TO TRIAD OF ASA ALLERGY, NASAL POLYPS,AND ASTHMA  AVOID DUE TO TRIAD OF ASA ALLERGY, NASAL POLYPS,AND ASTHMA    Penicillin      Other reaction(s): Rash    9/30/20: tolerates ceftriaxone    Penicillin g Other (See Comments)     Other reaction(s): Rash    9/30/20: tolerates ceftriaxone       No current facility-administered medications on file prior to encounter.     Current Outpatient Medications on File Prior to Encounter   Medication Sig    ACCU-CHEK GUIDE TEST STRIPS Strp TEST FOUR TIMES  DAILY WITH MEALS AND NIGHTLY    albuterol (PROVENTIL) 2.5 mg /3 mL (0.083 %) nebulizer solution Take 3 mLs (2.5 mg total) by nebulization every 6 (six) hours as needed for Wheezing or Shortness of Breath. Rescue    ascorbic acid, vitamin C, (VITAMIN C) 500 MG tablet Take 500 mg by mouth once daily.    atorvastatin (LIPITOR) 20 MG tablet Take 1 tablet (20 mg total) by mouth every evening.    budesonide 1 mg/2 mL [facility] SMARTSI Vial(s) Both Nares Twice Daily    chlorthalidone (HYGROTEN) 25 MG Tab Take 1 tablet (25 mg total) by mouth once daily.    denosumab (PROLIA) 60 mg/mL Syrg Inject 60 mg into the skin every 6 (six) months.    fluticasone-umeclidin-vilanter (TRELEGY ELLIPTA) 200-62.5-25 mcg inhaler Inhale 1 puff into the lungs once daily.    irbesartan (AVAPRO) 300 MG tablet Take 1 tablet (300 mg total) by mouth every evening.    lancets (ACCU-CHEK FASTCLIX LANCET DRUM) [facility] CHECK BLOOD GLUCOSE FOUR TIMES DAILY    latanoprost 0.005 % ophthalmic solution Inject into the eye. 1 Drops Ophthalmic Every evening    levocetirizine (XYZAL) 5 MG tablet Take 1 tablet (5 mg total) by mouth every evening.    lipase-protease-amylase 24,000-76,000-120,000 units (CREON) 24,000-76,000 -120,000 unit capsule Take 2 capsules with meals orally and 1 capsule with snacks.    magnesium oxide (MAG-OX) 400 mg (241.3 mg magnesium) tablet Take 400 mg by mouth once daily.    montelukast (SINGULAIR) 10 mg tablet Take 1 tablet (10 mg total) by mouth every evening.    NEILMED SINUS RINSE REFILL Pack use as directed    olopatadine (PATANOL) 0.1 % ophthalmic solution Place 1 drop into both eyes 2 (two) times daily as needed for Allergies. 1 Drops Ophthalmic Twice a day    vitamin D (VITAMIN D3) 1000 units Tab Take 1,000 Units by mouth once daily.    diclofenac sodium (VOLTAREN) 1 % Gel Apply 2 g topically 4 (four) times daily.    gabapentin (NEURONTIN) 100 MG capsule Take 1 capsule (100 mg total) by mouth 3 (three) times daily. (Patient taking  "differently: Take 100 mg by mouth 3 (three) times daily as needed.)    insulin aspart U-100 (NOVOLOG FLEXPEN U-100 INSULIN) 100 unit/mL (3 mL) InPn pen Inject 4 Units into the skin before breakfast AND 5 Units daily with lunch AND 4 Units daily with dinner or evening meal. Plus correction scale. Max TDD 30 units..    insulin detemir U-100, Levemir, (LEVEMIR FLEXTOUCH U100 INSULIN) 100 unit/mL (3 mL) InPn pen Inject 6 Units into the skin once daily AND 5 Units every evening. (Patient taking differently: Inject 7 Units into the skin once daily AND 5 Units every evening.)    LIDOcaine HCL 3 % Crea Apply 1 application  topically 2 (two) times daily. Apply to affected area    pen needle, diabetic 31 gauge x 5/16" Ndle Use with insulin pen 5 times a day    [DISCONTINUED] risedronate (ACTONEL) 35 MG tablet Take 1 tablet (35 mg total) by mouth every 7 days.     Family History       Problem Relation (Age of Onset)    Heart disease Father, Brother    Hypertension Brother          Tobacco Use    Smoking status: Never     Passive exposure: Never    Smokeless tobacco: Never   Substance and Sexual Activity    Alcohol use: Yes     Comment: socially    Drug use: No    Sexual activity: Yes     Partners: Male     Birth control/protection: Post-menopausal     Review of Systems   Constitutional:  Positive for activity change. Negative for appetite change and chills.   HENT: Negative.     Respiratory: Negative.     Cardiovascular: Negative.    Gastrointestinal: Negative.    Genitourinary: Negative.    Musculoskeletal: Negative.    Psychiatric/Behavioral: Negative.       Objective:     Vital Signs (Most Recent):  Temp: 98.3 °F (36.8 °C) (04/01/24 0719)  Pulse: 99 (04/01/24 1028)  Resp: 18 (04/01/24 0719)  BP: (!) 127/59 (04/01/24 0719)  SpO2: 96 % (04/01/24 0719) Vital Signs (24h Range):  Temp:  [97.5 °F (36.4 °C)-98.3 °F (36.8 °C)] 98.3 °F (36.8 °C)  Pulse:  [] 99  Resp:  [13-29] 18  SpO2:  [96 %-100 %] 96 %  BP: ()/(50-75) " 127/59     Weight: 42 kg (92 lb 9.5 oz)  Body mass index is 15.41 kg/m².     Physical Exam  Vitals and nursing note reviewed.   Constitutional:       Appearance: She is not toxic-appearing.   Eyes:      General: No scleral icterus.  Cardiovascular:      Rate and Rhythm: Regular rhythm.      Pulses: Normal pulses.   Pulmonary:      Effort: Pulmonary effort is normal. No respiratory distress.      Breath sounds: No wheezing.   Abdominal:      General: Bowel sounds are normal. There is no distension.      Palpations: Abdomen is soft.      Tenderness: There is no abdominal tenderness. There is no guarding.   Musculoskeletal:         General: Tenderness (left hip, pain w/leg movement) present.      Cervical back: Neck supple.      Right lower leg: No edema.      Left lower leg: No edema.      Comments: Bandages to LLE. Ropivicaine in place   Skin:     General: Skin is warm and dry.      Comments: Abrasions on dorsum of left hand/wrist   Neurological:      General: No focal deficit present.      Mental Status: She is alert and oriented to person, place, and time. Mental status is at baseline.   Psychiatric:         Mood and Affect: Mood normal.         Behavior: Behavior normal.                Significant Labs: All pertinent labs within the past 24 hours have been reviewed.  ABGs:   Recent Labs   Lab 03/30/24 2116   PH 7.377   PCO2 26.7*   HCO3 15.6*   POCSATURATED 97   BE -10*   PO2 88*       CBC:   Recent Labs   Lab 03/31/24  1145 03/31/24 2112 04/01/24  0412   WBC 14.20* 14.20* 10.84   HGB 6.9* 8.9* 8.2*   HCT 20.8* 27.8* 24.8*   * 105* 105*       CMP:   Recent Labs   Lab 03/30/24  1936/24  0533 04/01/24  0412    140 136   K 4.8 4.1 3.6   * 113* 112*   CO2 18* 20* 19*   * 303* 87   BUN 36* 34* 27*   CREATININE 2.1* 1.7* 1.6*   CALCIUM 9.4 8.5* 7.2*   PROT 6.0 4.8*  --    ALBUMIN 3.3* 2.8*  --    BILITOT 0.3 0.4  --    ALKPHOS 50* 47*  --    AST 24 36  --    ALT 34 46*  --    ANIONGAP 9  "7* 5*       Cardiac Markers:   Recent Labs   Lab 03/30/24 2143   *       Coagulation:   Recent Labs   Lab 03/30/24 2035 03/30/24  2345 03/31/24  0533   INR 7.2*   < > 1.1   APTT 56.6*  --   --     < > = values in this interval not displayed.       Lactic Acid: No results for input(s): "LACTATE" in the last 48 hours.  Magnesium:   Recent Labs   Lab 03/30/24 2035 03/31/24  0533 04/01/24  0412   MG 1.3* 1.3* 1.7       Troponin:   Recent Labs   Lab 03/30/24 2143 03/31/24  0533   TROPONINI 0.077* 0.021       Urine Studies:   Recent Labs   Lab 03/30/24 2104   COLORU Colorless*   APPEARANCEUA Clear   PHUR 5.0   SPECGRAV 1.015   PROTEINUA Negative   GLUCUA 4+*   KETONESU Negative   BILIRUBINUA Negative   OCCULTUA Negative   NITRITE Negative   LEUKOCYTESUR Negative   RBCUA 1   WBCUA 0   BACTERIA Rare         Significant Imaging: I have reviewed all pertinent imaging results/findings within the past 24 hours.    XR HIP WITH PELVIS WHEN PERFORMED, 2 OR 3 VIEWS LEFT     CLINICAL HISTORY:  Left hip pain;     TECHNIQUE:  AP view of the pelvis and frog leg lateral view of the left hip were performed.     COMPARISON:  CT abdomen and pelvis 11/18/2023     FINDINGS:  Four views left hip.     The bilateral sacroiliac joints are intact noting degenerative change.  There is osteopenia.  There are degenerative changes of the right femoroacetabular joint.  The pubic symphysis is intact.  There is acute appearing impacted intertrochanteric fracture of the left femur.  There are surgical changes of the left inguinal region.  There is moderate to large amount of stool in the colon.  There is vascular calcification.     Impression:     1. Intertrochanteric fracture of the left femur as described.        Electronically signed by: Matteo Stallworth MD  Date:                                            03/30/2024  Time:                                           20:17    EXAMINATION:  XR CHEST AP PORTABLE     CLINICAL HISTORY:  PRE-OP;   "   TECHNIQUE:  Single frontal view of the chest was performed.     COMPARISON:  11/18/2023     FINDINGS:  Cardiac monitoring leads overlie the chest.  The cardiac silhouette is stable in size and configuration.  There is prominent atherosclerotic calcification of the thoracic aorta.  Lungs demonstrate chronic coarse interstitial attenuation and scattered subsegmental opacities, similar to prior examination.  No new large confluent airspace consolidation appreciated.  No significant volume of pleural fluid or pneumothorax identified.  Presumed skin fold overlies the right hemithorax.  There is a chronic appearing right lateral 6th rib deformity.  Osseous structures demonstrate degenerative changes.     Impression:     As above.        Electronically signed by: Washington Rizo MD  Date:                                            03/30/2024  Time:                                           22:37                     XR WRIST COMPLETE 3 VIEWS LEFT     CLINICAL HISTORY:  fall;     TECHNIQUE:  PA, lateral, and oblique views of the left wrist were performed.     COMPARISON:  None     FINDINGS:  There is diffuse osteopenia.  No definite radiographic evidence of acute displaced fracture or dislocation of the left wrist.  There are mild degenerative changes present.  Soft tissues are unremarkable.     Impression:     No radiographic evidence of acute displaced fracture or dislocation of the left wrist.        Electronically signed by: Washington Rizo MD  Date:                                            03/30/2024  Time:                                           22:39

## 2024-04-01 NOTE — PROGRESS NOTES
[facility] Medicine  Progress Note    Patient Name: Sussy Costa  MRN: [id number]  Patient Class: IP- Inpatient   Admission Date: 3/30/2024  Length of Stay: 2 days  Attending Physician: Glenny Layton MD  Primary Care Provider: PAULA Casillas MD        Subjective:     Principal Problem:Closed 2-part intertrochanteric fracture of left femur        HPI:  89 y/o WF hx with Type 2 DM, CKD stage 3, HTN, pancreatic insufficiency, Asthma, Afib presents to the ED s/p fall with left hip pain.  She was at home and putting items in her fridge when she suffered a fall.  She fell onto her left hip, left arm outstretched, she did not hit her head and had no LOC.  She had immediate severe pain and was unable to stand.  EMS contacted and patient transported to the hospital, plain films show left femoral intertrochanteric fracture.   Her  was home but did not witness the fall.   & Son-in-Law present at bedside    She reports a history of left knee weakness/issues, attends outpatient PT @ [facility] for strengthening.  She has had multiple falls in recent history.  She had 10/10 pain on arrival, received Morphine 4mg IV and reports pain 5/10 during interview, her preceding blood pressures have been 190-200 systolic.   Rechecked at bedside and down to 139s.    At baseline patient performs all ADLs, toileting/bathing/clothing/preps food/manages meds/drives.      She has no active chest pain, dyspnea, no hx of CAD or CHF, no hx of stroke.  Her DASI score is 5.27 mets.  No tobacco/drug use, occasional EToh use.   GLucose on presentation 578 - pt reports she may have overate in afternoon, is adherent to home meds, reconciled at [facility]. Unsure of overall code status, remain Full code pending completion of surgery.     ED Meds: Insulin 5units IV x 1, Zofran IVx1, Morphine 4mg Iv x 1    Overview/Hospital Course:  No notes on file    Interval history- in good spirits today and eating breakfast.  Glucose dc to 80 last night and this AM after receiving 10 U short acting last night and 9 U levemir in AM so held long acting last night, this Am will give 5 short acting and long acting pending glucose after breakfast as long as over 120 (nursing reports was 130s so given) and titrate further given was 500s on admit was on higher than home dose but seems to be levling out now. She reports never sees that high of glucose at home so possibly acute stressor lead to highs with fx on admit. Bp was very high on admit also in 200s and then dec last afternoon to 90s systolic, bp meds were held and did not receive post op and improved after flud bolus and 1 U PRBCS post op for hg of 6.9 with improved hg of8.2  now and stable this Am. Cr improved from 2.1 on admit to 1.6 now within baselien ranges and updated her on this as well. She reports a little pain this morning but she let pain team know as managing pain with ropivicaine in place. Plan to trend closely glucose today given lability since admit and titrate further pending trends and f/u PT/OT recs. PM and R consulted to assess rehab candidacy.      Review of patient's allergies indicates:   Allergen Reactions    Aspirin Other (See Comments)     Asthma    TRIAD OF NASAL POLYPS, ASA  ALLERGY AND ASTHMA.        Aspirin Other (See Comments)    Nsaids (non-steroidal anti-inflammatory drug) Other (See Comments)     AVOID DUE TO TRIAD OF ASA ALLERGY, NASAL POLYPS,AND ASTHMA  AVOID DUE TO TRIAD OF ASA ALLERGY, NASAL POLYPS,AND ASTHMA    Penicillin      Other reaction(s): Rash    9/30/20: tolerates ceftriaxone    Penicillin g Other (See Comments)     Other reaction(s): Rash    9/30/20: tolerates ceftriaxone       No current facility-administered medications on file prior to encounter.     Current Outpatient Medications on File Prior to Encounter   Medication Sig    ACCU-CHEK GUIDE TEST STRIPS Strp TEST FOUR TIMES DAILY WITH MEALS AND NIGHTLY    albuterol (PROVENTIL) 2.5 mg /3  mL (0.083 %) nebulizer solution Take 3 mLs (2.5 mg total) by nebulization every 6 (six) hours as needed for Wheezing or Shortness of Breath. Rescue    ascorbic acid, vitamin C, (VITAMIN C) 500 MG tablet Take 500 mg by mouth once daily.    atorvastatin (LIPITOR) 20 MG tablet Take 1 tablet (20 mg total) by mouth every evening.    budesonide 1 mg/2 mL NbSp SMARTSI Vial(s) Both Nares Twice Daily    chlorthalidone (HYGROTEN) 25 MG Tab Take 1 tablet (25 mg total) by mouth once daily.    denosumab (PROLIA) 60 mg/mL Syrg Inject 60 mg into the skin every 6 (six) months.    fluticasone-umeclidin-vilanter (TRELEGY ELLIPTA) 200-62.5-25 mcg inhaler Inhale 1 puff into the lungs once daily.    irbesartan (AVAPRO) 300 MG tablet Take 1 tablet (300 mg total) by mouth every evening.    lancets (ACCU-CHEK FASTCLIX LANCET DRUM) [facility] CHECK BLOOD GLUCOSE FOUR TIMES DAILY    latanoprost 0.005 % ophthalmic solution Inject into the eye. 1 Drops Ophthalmic Every evening    levocetirizine (XYZAL) 5 MG tablet Take 1 tablet (5 mg total) by mouth every evening.    lipase-protease-amylase 24,000-76,000-120,000 units (CREON) 24,000-76,000 -120,000 unit capsule Take 2 capsules with meals orally and 1 capsule with snacks.    magnesium oxide (MAG-OX) 400 mg (241.3 mg magnesium) tablet Take 400 mg by mouth once daily.    montelukast (SINGULAIR) 10 mg tablet Take 1 tablet (10 mg total) by mouth every evening.    NEILMED SINUS RINSE REFILL Pack use as directed    olopatadine (PATANOL) 0.1 % ophthalmic solution Place 1 drop into both eyes 2 (two) times daily as needed for Allergies. 1 Drops Ophthalmic Twice a day    vitamin D (VITAMIN D3) 1000 units Tab Take 1,000 Units by mouth once daily.    diclofenac sodium (VOLTAREN) 1 % Gel Apply 2 g topically 4 (four) times daily.    gabapentin (NEURONTIN) 100 MG capsule Take 1 capsule (100 mg total) by mouth 3 (three) times daily. (Patient taking differently: Take 100 mg by mouth 3 (three) times daily as  "needed.)    insulin aspart U-100 (NOVOLOG FLEXPEN U-100 INSULIN) 100 unit/mL (3 mL) InPn pen Inject 4 Units into the skin before breakfast AND 5 Units daily with lunch AND 4 Units daily with dinner or evening meal. Plus correction scale. Max TDD 30 units..    insulin detemir U-100, Levemir, (LEVEMIR FLEXTOUCH U100 INSULIN) 100 unit/mL (3 mL) InPn pen Inject 6 Units into the skin once daily AND 5 Units every evening. (Patient taking differently: Inject 7 Units into the skin once daily AND 5 Units every evening.)    LIDOcaine HCL 3 % Crea Apply 1 application  topically 2 (two) times daily. Apply to affected area    pen needle, diabetic 31 gauge x 5/16" Ndle Use with insulin pen 5 times a day    [DISCONTINUED] risedronate (ACTONEL) 35 MG tablet Take 1 tablet (35 mg total) by mouth every 7 days.     Family History       Problem Relation (Age of Onset)    Heart disease Father, Brother    Hypertension Brother          Tobacco Use    Smoking status: Never     Passive exposure: Never    Smokeless tobacco: Never   Substance and Sexual Activity    Alcohol use: Yes     Comment: socially    Drug use: No    Sexual activity: Yes     Partners: Male     Birth control/protection: Post-menopausal     Review of Systems   Constitutional:  Positive for activity change. Negative for appetite change and chills.   HENT: Negative.     Respiratory: Negative.     Cardiovascular: Negative.    Gastrointestinal: Negative.    Genitourinary: Negative.    Musculoskeletal: Negative.    Psychiatric/Behavioral: Negative.       Objective:     Vital Signs (Most Recent):  Temp: 98.3 °F (36.8 °C) (04/01/24 0719)  Pulse: 99 (04/01/24 1028)  Resp: 18 (04/01/24 0719)  BP: (!) 127/59 (04/01/24 0719)  SpO2: 96 % (04/01/24 0719) Vital Signs (24h Range):  Temp:  [97.5 °F (36.4 °C)-98.3 °F (36.8 °C)] 98.3 °F (36.8 °C)  Pulse:  [] 99  Resp:  [13-29] 18  SpO2:  [96 %-100 %] 96 %  BP: ()/(50-75) 127/59     Weight: 42 kg (92 lb 9.5 oz)  Body mass index is " 15.41 kg/m².     Physical Exam  Vitals and nursing note reviewed.   Constitutional:       Appearance: She is not toxic-appearing.   Eyes:      General: No scleral icterus.  Cardiovascular:      Rate and Rhythm: Regular rhythm.      Pulses: Normal pulses.   Pulmonary:      Effort: Pulmonary effort is normal. No respiratory distress.      Breath sounds: No wheezing.   Abdominal:      General: Bowel sounds are normal. There is no distension.      Palpations: Abdomen is soft.      Tenderness: There is no abdominal tenderness. There is no guarding.   Musculoskeletal:         General: Tenderness (left hip, pain w/leg movement) present.      Cervical back: Neck supple.      Right lower leg: No edema.      Left lower leg: No edema.      Comments: Bandages to LLE. Ropivicaine in place   Skin:     General: Skin is warm and dry.      Comments: Abrasions on dorsum of left hand/wrist   Neurological:      General: No focal deficit present.      Mental Status: She is alert and oriented to person, place, and time. Mental status is at baseline.   Psychiatric:         Mood and Affect: Mood normal.         Behavior: Behavior normal.                Significant Labs: All pertinent labs within the past 24 hours have been reviewed.  ABGs:   Recent Labs   Lab 03/30/24 2116   PH 7.377   PCO2 26.7*   HCO3 15.6*   POCSATURATED 97   BE -10*   PO2 88*       CBC:   Recent Labs   Lab 03/31/24  1145 03/31/24  2112 04/01/24  0412   WBC 14.20* 14.20* 10.84   HGB 6.9* 8.9* 8.2*   HCT 20.8* 27.8* 24.8*   * 105* 105*       CMP:   Recent Labs   Lab 03/30/24  1936/24  0533 04/01/24  0412    140 136   K 4.8 4.1 3.6   * 113* 112*   CO2 18* 20* 19*   * 303* 87   BUN 36* 34* 27*   CREATININE 2.1* 1.7* 1.6*   CALCIUM 9.4 8.5* 7.2*   PROT 6.0 4.8*  --    ALBUMIN 3.3* 2.8*  --    BILITOT 0.3 0.4  --    ALKPHOS 50* 47*  --    AST 24 36  --    ALT 34 46*  --    ANIONGAP 9 7* 5*       Cardiac Markers:   Recent Labs   Lab  "03/30/24 2143   *       Coagulation:   Recent Labs   Lab 03/30/24 2035 03/30/24  2345 03/31/24  0533   INR 7.2*   < > 1.1   APTT 56.6*  --   --     < > = values in this interval not displayed.       Lactic Acid: No results for input(s): "LACTATE" in the last 48 hours.  Magnesium:   Recent Labs   Lab 03/30/24 2035 03/31/24  0533 04/01/24  0412   MG 1.3* 1.3* 1.7       Troponin:   Recent Labs   Lab 03/30/24 2143 03/31/24  0533   TROPONINI 0.077* 0.021       Urine Studies:   Recent Labs   Lab 03/30/24 2104   COLORU Colorless*   APPEARANCEUA Clear   PHUR 5.0   SPECGRAV 1.015   PROTEINUA Negative   GLUCUA 4+*   KETONESU Negative   BILIRUBINUA Negative   OCCULTUA Negative   NITRITE Negative   LEUKOCYTESUR Negative   RBCUA 1   WBCUA 0   BACTERIA Rare         Significant Imaging: I have reviewed all pertinent imaging results/findings within the past 24 hours.    XR HIP WITH PELVIS WHEN PERFORMED, 2 OR 3 VIEWS LEFT     CLINICAL HISTORY:  Left hip pain;     TECHNIQUE:  AP view of the pelvis and frog leg lateral view of the left hip were performed.     COMPARISON:  CT abdomen and pelvis 11/18/2023     FINDINGS:  Four views left hip.     The bilateral sacroiliac joints are intact noting degenerative change.  There is osteopenia.  There are degenerative changes of the right femoroacetabular joint.  The pubic symphysis is intact.  There is acute appearing impacted intertrochanteric fracture of the left femur.  There are surgical changes of the left inguinal region.  There is moderate to large amount of stool in the colon.  There is vascular calcification.     Impression:     1. Intertrochanteric fracture of the left femur as described.        Electronically signed by: Matteo Stallworth MD  Date:                                            03/30/2024  Time:                                           20:17    EXAMINATION:  XR CHEST AP PORTABLE     CLINICAL HISTORY:  PRE-OP;     TECHNIQUE:  Single frontal view of the chest " was performed.     COMPARISON:  11/18/2023     FINDINGS:  Cardiac monitoring leads overlie the chest.  The cardiac silhouette is stable in size and configuration.  There is prominent atherosclerotic calcification of the thoracic aorta.  Lungs demonstrate chronic coarse interstitial attenuation and scattered subsegmental opacities, similar to prior examination.  No new large confluent airspace consolidation appreciated.  No significant volume of pleural fluid or pneumothorax identified.  Presumed skin fold overlies the right hemithorax.  There is a chronic appearing right lateral 6th rib deformity.  Osseous structures demonstrate degenerative changes.     Impression:     As above.        Electronically signed by: Washington Rizo MD  Date:                                            03/30/2024  Time:                                           22:37                     XR WRIST COMPLETE 3 VIEWS LEFT     CLINICAL HISTORY:  fall;     TECHNIQUE:  PA, lateral, and oblique views of the left wrist were performed.     COMPARISON:  None     FINDINGS:  There is diffuse osteopenia.  No definite radiographic evidence of acute displaced fracture or dislocation of the left wrist.  There are mild degenerative changes present.  Soft tissues are unremarkable.     Impression:     No radiographic evidence of acute displaced fracture or dislocation of the left wrist.        Electronically signed by: Washington Rizo MD  Date:                                            03/30/2024  Time:                                           22:39          Assessment/Plan:      * Closed 2-part intertrochanteric fracture of left femur  -sustained in mechanical fall and IT fx found on admit.  Ortho with OR 3/31 with dr garcia with IM nail with WBAT post op with PT/OT on POD 1 and recs pending, Pm and R consulted to see if rehab candidate  -multimodals for pain control with anesthesia managing with ropivicaine placed  -bowel regimen with large stool burden  seen on CT on admit  -vit D mildly low at 27 and replacement started  -ca out on POD 1 today  -eliquis for 35 days post op for dvt ppx with end date 4/29    ADARSH (acute kidney injury)  Patient with acute kidney injury/acute renal failure likely due to pre-renal azotemia due to IVVD ADARSH is currently improving. Baseline creatinine  1.2 to 1.7  - Labs reviewed- Renal function/electrolytes with Estimated Creatinine Clearance: 16.4 mL/min (A) (based on SCr of 1.6 mg/dL (H)). according to latest data. Monitor urine output and serial BMP and adjust therapy as needed. Avoid nephrotoxins and renally dose meds for GFR listed above.    Improved from 2.1 on admit with hyperglycemia likely also contributing to volume depletion and now at 1.7-->1.6    Constipation  Stool burden large seen on CT pelvis on admit and bowel regimen started. If no BM in 24 hours of admit likely will add more aggressive regimen early      Vitamin D insufficiency  Mildly low at 27 and replacement started      Acute blood loss anemia  Patient's anemia is currently controlled. Has received 1 units of PRBCs on 3/31 PM post op with improvement in BP and Hg to 8.2 . Etiology likely d/t acute blood loss which was from surgical losses and inflammation from fracture with baseline anemia at 9.9  Current CBC reviewed-   Lab Results   Component Value Date    HGB 8.2 (L) 04/01/2024    HCT 24.8 (L) 04/01/2024     Monitor serial CBC and transfuse if patient becomes hemodynamically unstable, symptomatic or H/H drops below 7/21.    Hypomagnesemia  Patient has Abnormal Magnesium: hypomagnesemia. Will continue to monitor electrolytes closely. Will replace the affected electrolytes and repeat labs to be done after interventions completed. The patient's magnesium results have been reviewed and are listed below.  Recent Labs   Lab 03/31/24  0533   MG 1.3*        Stage 3 chronic kidney disease  Creatine stable for now. BMP reviewed- noted Estimated Creatinine Clearance:  16.4 mL/min (A) (based on SCr of 1.6 mg/dL (H)). according to latest data. Based on current GFR, CKD stage is stage 3 - GFR 30-59.  Monitor UOP and serial BMP and adjust therapy as needed. Renally dose meds. Avoid nephrotoxic medications and procedures.  Baseline Cr 1.2 to 1.7 with Fahad with Cr 2. 1 on admit. Given IVF on admit with improving Cr now to 1.6 back within baseline ranges  -on ARB at home, hold until BP stable    Paroxysmal atrial fibrillation  Patient with Paroxysmal (<7 days) atrial fibrillation which is controlled currently with  on no direct rate or rhythm control . Patient is currently in sinus rhythm.[id number] Score: 4. . Anticoagulation not indicated due to not on as outpatient, not indicated to start now .for this reason but is indicated for dvt ppx post op for 35 days for post hip fx    Carcinoid bronchial adenoma, right  Per overview an indolent incidental finding that is being observed with surveillance as an outpatient, on no active treatment.       Pancreatic insufficiency  -cont home dosage of Creon 120, @ 2 tabs with meals and 1 prn with snacks      Type 2 diabetes mellitus with microalbuminuria, without long-term current use of insulin  She presents with uncontrolled hyperglycemia, no anion gap VBG demonstrates stable PH no ketonemia.  Her serum osmolality is 336, no altered mental status.  And based on calculating osmolality, gap is 14 within normal range.  no HHS at this time no other acute symptoms of hyperglycemic crisis.  -given 6 u of levemir on admit with glucose still 300s on 3/31 AM. Will adjust to 9 U pre op and titrate further post op in PM. Will f/u post op glucose but do at least 6 U of novolog with meals possibly more pending trend. Anesthesia asked to avoid steroids intra-op for intubation and appears did not receive.  -glucose in 80s on 3/31 PM and 4/1 AM, held levemir on 3/31 PM given this, had 10 U sliding scale on 3/31 PM, dec levemir to 5 U BID now and novolog 5 U  close to home dose now, and glucose 130 after breakfast so new dosing started this AM and will trend closely given lability may likely need further titration  -reports at home has some lows to 80s and some highs to 300s but never 500s  -A1c 7.4  -DM diet        Asthma, moderate persistent  Patient had recent asthma exacerbation few weeks ago - uses nebulizers BID - continue this schedule  -Use BREO+ SPIRIVA in place of home TRELEGY  -continue home singulair.       Senile osteoporosis  Vit D mildly low at 27 and replacement started  -associated with fx related osteoperosis  -resume home dosage  -patient on PROLIA every 6 months - last dose in Feb 2024      Primary hypertension  Chronic, controlled. Latest blood pressure and vitals reviewed-   Home meds for hypertension were reviewed and noted below.   Hypertension Medications               chlorthalidone (HYGROTEN) 25 MG Tab Take 1 tablet (25 mg total) by mouth once daily.    irbesartan (AVAPRO) 300 MG tablet Take 1 tablet (300 mg total) by mouth every evening.            While in the hospital, will manage blood pressure as follows; Adjust home antihypertensive regimen as follows- Bp very high to 200s in ER then improved on admit to 140s pre op, dc to 90s in post op period on floor and BP meds were not given (home arb) and improved with 1 U PRB + fluid bolus.  Trending now and in 120s systolic so watch for when higher to resume home arb with biju improved       VTE Risk Mitigation (From admission, onward)           Ordered     apixaban tablet 2.5 mg  2 times daily         03/31/24 1112     IP VTE HIGH RISK PATIENT  Once         03/31/24 0024     Place sequential compression device  Until discontinued         03/31/24 0024                    Discharge Planning   ANTOINE: 4/3/2024     Code Status: Full Code   Is the patient medically ready for discharge?: No    Reason for patient still in hospital (select all that apply): Patient trending condition  Discharge Plan A: Rehab                   Glenny Layton MD  Department of Hospital Medicine   [facility] - Surgery

## 2024-04-01 NOTE — HPI
Sussy Costa is an 88-year-old female with PMHx of Type 2 DM, CKD, HTN, pancreatic insufficiency, asthma, a fib. Patient presented to [facility] on 3/30/24 for a mechanical fall with left hip pain. Upon arrival, found to have a intertrochanteric fracture of left femur. S/p CMN 3/31/24. Per Ortho WBAT to LLE. DVT ppx Eliquis.     Functional History: Patient lives with  in a single story home with small step to enter.  Prior to admission, Max. DME: [facility], w/c.

## 2024-04-01 NOTE — CONSULTS
[facility] - Surgery  Wound Care    Patient Name:  Sussy Costa   MRN:  [id number]  Date: 4/1/2024  Diagnosis: Closed 2-part intertrochanteric fracture of left femur    History:     Past Medical History:   Diagnosis Date    Asthma     Cyst of pancreas     liver    Diabetes mellitus type II     Eczema of hand     Glaucoma     History of colon cancer 10/28/2022    Right sided colon cancer diagnosed in setting of LBO requiring urgent hemicolectomy 10/25/2022 with path showing pT4aN0 disease. CT chest 12/7/22 with possible lung metastases and MR liver with indeterminate liver lesions. 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 Patient elected for surveillance.       History of recurrent pneumonia 09/28/2020    Hyperlipidemia     Hypertension     Lichen planus     gums    Osteoporosis     Other chronic pancreatitis 03/01/2023    Pancreas cyst 03/18/2016    Pulmonary nodules     Spinal stenosis of lumbar region 08/30/2018       Social History     Socioeconomic History    Marital status:      Spouse name: chuck    Number of children: 1   Occupational History    Occupation:    Tobacco Use    Smoking status: Never     Passive exposure: Never    Smokeless tobacco: Never   Substance and Sexual Activity    Alcohol use: Yes     Comment: socially    Drug use: No    Sexual activity: Yes     Partners: Male     Birth control/protection: Post-menopausal   Social History Narrative    Lives in [place] with  Chuck. Daughter passed away in 2017 with leukemia. Two adult grandchildren. Former .     Social Determinants of Health     Financial Resource Strain: Low Risk  (3/31/2024)    Overall Financial Resource Strain (CARDIA)     Difficulty of Paying Living Expenses: Not hard at all   Food Insecurity: No Food Insecurity (3/31/2024)    Hunger Vital Sign     Worried About Running Out of Food in the Last Year: Never true      Ran Out of Food in the Last Year: Never true   Transportation Needs: No Transportation Needs (3/31/2024)    PRAPARE - Transportation     Lack of Transportation (Medical): No     Lack of Transportation (Non-Medical): No   Physical Activity: Sufficiently Active (3/31/2024)    Exercise Vital Sign     Days of Exercise per Week: 3 days     Minutes of Exercise per Session: 60 min   Stress: No Stress Concern Present (3/31/2024)    [employer] [place] [employer] - Occupational Stress Questionnaire     Feeling of Stress : Not at all   Social Connections: Socially Integrated (3/31/2024)    Social Connection and Isolation Panel [NHANES]     Frequency of Communication with Friends and Family: More than three times a week     Frequency of Social Gatherings with Friends and Family: More than three times a week     Attends Gnosticist Services: More than 4 times per year     Active Member of Clubs or Organizations: Yes     Attends Club or Organization Meetings: 1 to 4 times per year     Marital Status:    Housing Stability: Low Risk  (3/31/2024)    Housing Stability Vital Sign     Unable to Pay for Housing in the Last Year: No     Number of Places Lived in the Last Year: 1     Unstable Housing in the Last Year: No       Precautions:     Allergies as of 03/30/2024 - Reviewed 03/30/2024   Allergen Reaction Noted    Aspirin Other (See Comments) 09/25/2013    Aspirin Other (See Comments) 09/24/2012    Nsaids (non-steroidal anti-inflammatory drug) Other (See Comments) 09/25/2013    Penicillin  08/04/2005    Penicillin g Other (See Comments) 08/04/2005       [facility] Assessment Details/Treatment     Patient seen for wound care consultation. -placed by RN for LUE. Pt with skin tears to LUE from fall at home prior to admission.    Reviewed chart for this encounter.   See Flow Sheet for findings.                                                                                                               Pt awake/ alert- sitting  up in bedside recliner- agreeable to care at this time. LUE with skin tears x2- both with partial viable skin flaps able to be smoothed over open wound bed resulting in overall smaller wound space. Cleansed wounds with Vashe solution for antimicrobial property- covered with border foam dressing to maintain moist wound environment and for added absorption and protection to support healing. Pt tolerated care without c/o discomfort- and able to voice good basic understanding re care provided.    RECOMMENDATIONS:  LUE- skin tear: 1)clean wound with Vashe solution. 2)cover wound with border foam dressing. Perform care 2x/ wek until healed.    Discussed POC with patient   See EMR for orders & patient education.    Bedside nursing to continue care & monitoring.  Bedside nursing to maintain pressure injury prevention interventions.  Current documented Sae score is 16 -with a nutrition sub-scale score of 3.     04/01/24 1410   WOCN Assessment   WOCN Total Time (mins) 30   Visit Date 04/01/24   Visit Time 1410   Consult Type New   WOCN Speciality Wound   Number of Wounds 2   Intervention assessed;changed;chart review;orders   Teaching on-going  (pt- skin tear assmt and recommendations for dressing changes.)   Positioning   Body Position position maintained  (-pt up in chair.)   Head of Bed (HOB) Positioning HOB elevated   Positioning/Transfer Devices pillows        Altered Skin Integrity 03/30/24 Left dorsal Hand Skin Tear   Date First Assessed: 03/30/24   Altered Skin Integrity Present on Admission - Did Patient arrive to the hospital with altered skin?: yes  Side: Left  Orientation: dorsal  Location: Hand  Is this injury device related?: No  Primary Wound Type: Skin Tear   Wound Image    Dressing Appearance Dried drainage   Drainage Amount Small   Drainage Characteristics/Odor Sanguineous   Appearance Pink   Tissue loss description Partial thickness   Periwound Area Ecchymotic   Care Cleansed with:;Antimicrobial  agent  (Vashe solution)   Dressing Changed;Foam   Periwound Care Skin barrier film applied   Dressing Change Due 04/04/24        Altered Skin Integrity 03/30/24 Left anterior;lower Arm Skin Tear   Date First Assessed: 03/30/24   Altered Skin Integrity Present on Admission - Did Patient arrive to the hospital with altered skin?: yes  Side: Left  Orientation: anterior;lower  Location: Arm  Primary Wound Type: Skin Tear   Wound Image    Dressing Appearance Dried drainage   Drainage Amount Scant   Drainage Characteristics/Odor Serosanguineous   Appearance Pink   Tissue loss description Partial thickness   Periwound Area Ecchymotic   Care Cleansed with:;Antimicrobial agent  (Vashe solution)   Dressing Changed;Foam   Periwound Care Dry periwound area maintained   Dressing Change Due 04/04/24     Will continue to follow as needed and/ or as directed until discharge.  Martha Moya BSN, RN, CWOCN  04/01/2024

## 2024-04-01 NOTE — NURSING
Nurses Note -- 4 Eyes      4/1/2024   4:31 PM      Skin assessed during: Daily Assessment      [x] No Altered Skin Integrity Present    []Prevention Measures Documented      [] Yes- Altered Skin Integrity Present or Discovered   [] LDA Added if Not in Epic (Describe Wound)   [] New Altered Skin Integrity was Present on Admit and Documented in LDA   [] Wound Image Taken    Wound Care Consulted? No    Attending Nurse:  Ying Vega RN    Second RN/Staff Member:  Jenny Kelly RN

## 2024-04-01 NOTE — PROGRESS NOTES
[facility] - Surgery  Orthopedics  Progress Note    Patient Name: Sussy Costa  MRN: [id number]  Admission Date: 3/30/2024  Hospital Length of Stay: 2 days  Attending Provider: Glenny Layton MD  Primary Care Provider: PAULA Casillas MD  Follow-up For: Procedure(s) (LRB):  INSERTION, INTRAMEDULLARY CAYDEN, FEMUR - Left, (Left)    Post-Operative Day: 1 Day Post-Op  Subjective:     Principal Problem:Closed 2-part intertrochanteric fracture of left femur    Principal Orthopedic Problem: same, s/p CMN 3/31    Interval History: No acute events overnight.  Vitals within normal limits.  Pain adequately controlled this morning while at rest.  Anticipate PT/OT evaluation today.    Review of patient's allergies indicates:   Allergen Reactions    Aspirin Other (See Comments)     Asthma    TRIAD OF NASAL POLYPS, ASA  ALLERGY AND ASTHMA.        Aspirin Other (See Comments)    Nsaids (non-steroidal anti-inflammatory drug) Other (See Comments)     AVOID DUE TO TRIAD OF ASA ALLERGY, NASAL POLYPS,AND ASTHMA  AVOID DUE TO TRIAD OF ASA ALLERGY, NASAL POLYPS,AND ASTHMA    Penicillin      Other reaction(s): Rash    9/30/20: tolerates ceftriaxone    Penicillin g Other (See Comments)     Other reaction(s): Rash    9/30/20: tolerates ceftriaxone       Current Facility-Administered Medications   Medication    0.9%  NaCl infusion (for blood administration)    acetaminophen tablet 1,000 mg    albuterol sulfate nebulizer solution 2.5 mg    apixaban tablet 2.5 mg    ascorbic acid (vitamin C) tablet 500 mg    atorvastatin tablet 20 mg    bisacodyL suppository 10 mg    ceFAZolin 2 g in dextrose 5 % in water (D5W) 50 mL IVPB (MB+)    cetirizine tablet 5 mg    dextrose 10% bolus 125 mL 125 mL    dextrose 10% bolus 125 mL 125 mL    dextrose 10% bolus 250 mL 250 mL    dextrose 10% bolus 250 mL 250 mL    fluticasone furoate-vilanteroL 200-25 mcg/dose diskus inhaler 1 puff    And    tiotropium bromide 2.5 mcg/actuation inhaler 2 puff    glucagon  "(human recombinant) injection 1 mg    glucose chewable tablet 16 g    glucose chewable tablet 24 g    insulin aspart U-100 pen 0-10 Units    insulin aspart U-100 pen 5 Units    insulin detemir U-100 (Levemir) pen 5 Units    latanoprost 0.005 % ophthalmic solution 1 drop    lipase-protease-amylase 24,000-76,000-120,000 units capsule 1 capsule    lipase-protease-amylase 24,000-76,000-120,000 units capsule 2 capsule    magnesium oxide tablet 400 mg    melatonin tablet 6 mg    methocarbamoL tablet 500 mg    montelukast tablet 10 mg    mupirocin 2 % ointment 1 g    olopatadine 0.1 % ophthalmic solution 1 drop    ondansetron disintegrating tablet 8 mg    ondansetron injection 4 mg    polyethylene glycol packet 17 g    ROPIvacaine (PF) 2 mg/ml (0.2%) solution    senna-docusate 8.6-50 mg per tablet 1 tablet    sodium chloride 0.9% flush 10 mL    tuberculin injection 5 Units    vitamin D 1000 units tablet 1,000 Units     Objective:     Vital Signs (Most Recent):  Temp: 98.3 °F (36.8 °C) (04/01/24 0719)  Pulse: 81 (04/01/24 0719)  Resp: 18 (04/01/24 0719)  BP: (!) 127/59 (04/01/24 0719)  SpO2: 96 % (04/01/24 0719) Vital Signs (24h Range):  Temp:  [97.5 °F (36.4 °C)-98.3 °F (36.8 °C)] 98.3 °F (36.8 °C)  Pulse:  [] 81  Resp:  [13-29] 18  SpO2:  [96 %-100 %] 96 %  BP: ()/(50-75) 127/59     Weight: 42 kg (92 lb 9.5 oz)  Height: 5' 5" (165.1 cm)  Body mass index is 15.41 kg/m².      Intake/Output Summary (Last 24 hours) at 4/1/2024 0740  Last data filed at 4/1/2024 0608  Gross per 24 hour   Intake 1300 ml   Output 1300 ml   Net 0 ml        Ortho/SPM Exam  Gen: NAD, sitting comfortably in bed  CV: regular rate  Resp: non-labored respirations    LLE:  Dressings clean, dry, and intact  No significant hematoma over operative site  Appropriate postoperative TTP  SILT Sa/Stephens/DP/SP/T  Motor intact EHL/FHL/TA/Gastroc/Quad  2+ DP, 2+ PT       Significant Labs: All pertinent labs within the past 24 hours have been " reviewed.    Significant Imaging: I have reviewed and interpreted all pertinent imaging results/findings.  Assessment/Plan:     * Closed 2-part intertrochanteric fracture of left femur  Sussy Costa is a 88 y.o. female with a left intertrochanteric femur fracture. She is closed and neurovascularly intact. Now s/p left femur CMN on 3/31/24 with Dr. Walker. Doing well this morning.    Plan:  Pain control: multimodal, PNC per Anesthesia  PT/OT: WBAT LLE  DVT PPx: Eliquis 2.5 mg BID, SCDs at all times when not ambulating  Abx: postop Ancef x 24 hours  Labs: hemoglobin 8.2; continue to trend  Guevara: remove 4/1/24    Dispo: f/u PT/OT recommendations and hemoglobin trend            Vinh Jones MD  Orthopedics  [facility] - Surgery

## 2024-04-01 NOTE — ADDENDUM NOTE
Addendum  created 04/01/24 1219 by Jameel Francois MD    Charge Capture section accepted, Cosign clinical note with attestation

## 2024-04-01 NOTE — CONSULTS
Inpatient consult to Physical Medicine Rehab  Consult performed by: Marisol Hernández NP  Consult ordered by: Glenny Layton MD  Reason for consult: Rehab      Consult received.     MICHAELA Armando, FNP-C  Physical Medicine & Rehabilitation   04/01/2024

## 2024-04-01 NOTE — SUBJECTIVE & OBJECTIVE
Past Medical History:   Diagnosis Date    Asthma     Cyst of pancreas     liver    Diabetes mellitus type II     Eczema of hand     Glaucoma     History of colon cancer 10/28/2022    Right sided colon cancer diagnosed in setting of LBO requiring urgent hemicolectomy 10/25/2022 with path showing pT4aN0 disease. CT chest 12/7/22 with possible lung metastases and MR liver with indeterminate liver lesions. 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 Patient elected for surveillance.       History of recurrent pneumonia 09/28/2020    Hyperlipidemia     Hypertension     Lichen planus     gums    Osteoporosis     Other chronic pancreatitis 03/01/2023    Pancreas cyst 03/18/2016    Pulmonary nodules     Spinal stenosis of lumbar region 08/30/2018     Past Surgical History:   Procedure Laterality Date    BRONCHOSCOPY N/A 5/13/2022    Procedure: Bronchoscopy;  Surgeon: [facility] Diagnostic Provider;  [facility];  Service: Anesthesiology;  Laterality: N/A;    CATARACT EXTRACTION, BILATERAL      CHOLECYSTECTOMY      COLECTOMY, RIGHT Right 10/25/2022    Procedure: COLECTOMY, RIGHT;  Surgeon: Jass Holman MD;  [facility];  Service: Colon and Rectal;  Laterality: Right;    COLONOSCOPY N/A 6/26/2023    Procedure: COLONOSCOPY;  Surgeon: Jass Holman MD;  Location: [facility] ENDO (2ND FLR);  Service: Endoscopy;  Laterality: N/A;  2nd floor -lung disease  referral Dr [id number] portal/mailed-GT  6/20/23- [place] confirmed- [place]    ENDOSCOPIC ULTRASOUND OF UPPER GASTROINTESTINAL TRACT N/A 4/22/2022    Procedure: ULTRASOUND, UPPER GI TRACT, ENDOSCOPIC;  Surgeon: Max Rosado MD;  Location: [facility] ([facility]);  Service: Endoscopy;  Laterality: N/A;  urgent EUS (linear) for abnormal CT scan with dilated PD and increased cyst of pancreas cyst.  pap 44 mmHg  4/13:fully vaccinated. instructions via portal-SC     EPIDURAL STEROID INJECTION INTO LUMBAR SPINE N/A 11/8/2018    Procedure: Injection-steroid-epidural-lumbar L5-S1;  Surgeon: Nicci Osorio Jr., MD;  [facility];  Service: Pain Management;  Laterality: N/A;    FUNCTIONAL ENDOSCOPIC SINUS SURGERY (FESS) USING COMPUTER-ASSISTED NAVIGATION N/A 6/17/2019    Procedure: FESS, USING COMPUTER-ASSISTED NAVIGATION;  Surgeon: Rosangela Isabel MD;  Location: [facility] OR;  Service: ENT;  Laterality: N/A;  video    HYSTERECTOMY      INTRAMEDULLARY RODDING OF FEMUR Left 3/31/2024    Procedure: INSERTION, INTRAMEDULLARY CAYDEN, FEMUR - Left,;  Surgeon: Nakul Walker MD;  Location: [address];  Service: Orthopedics;  Laterality: Left;    nasal polyps       Review of patient's allergies indicates:   Allergen Reactions    Aspirin Other (See Comments)     Asthma    TRIAD OF NASAL POLYPS, ASA  ALLERGY AND ASTHMA.        Aspirin Other (See Comments)    Nsaids (non-steroidal anti-inflammatory drug) Other (See Comments)     AVOID DUE TO TRIAD OF ASA ALLERGY, NASAL POLYPS,AND ASTHMA  AVOID DUE TO TRIAD OF ASA ALLERGY, NASAL POLYPS,AND ASTHMA    Penicillin      Other reaction(s): Rash    9/30/20: tolerates ceftriaxone    Penicillin g Other (See Comments)     Other reaction(s): Rash    9/30/20: tolerates ceftriaxone       Scheduled Medications:    acetaminophen  1,000 mg Oral Q6H    apixaban  2.5 mg Oral BID    ascorbic acid (vitamin C)  500 mg Oral Daily    atorvastatin  20 mg Oral QHS    ceFAZolin (Ancef) IV (PEDS and ADULTS)  2 g Intravenous Q12H    cetirizine  5 mg Oral QHS    fluticasone furoate-vilanteroL  1 puff Inhalation Daily    And    tiotropium bromide  2 puff Inhalation Daily    insulin aspart U-100  5 Units Subcutaneous TIDWM    insulin detemir U-100  5 Units Subcutaneous BID    latanoprost  1 drop Both Eyes QHS    lipase-protease-amylase 24,000-76,000-120,000 units  2 capsule Oral TID WM    magnesium oxide  400 mg Oral Daily    montelukast  10 mg Oral  QHS    mupirocin  1 g Nasal BID    polyethylene glycol  17 g Oral Daily    senna-docusate 8.6-50 mg  1 tablet Oral BID    tuberculin  5 Units Intradermal Once    vitamin D  1,000 Units Oral Daily       PRN Medications: 0.9%  NaCl infusion (for blood administration), albuterol sulfate, bisacodyL, calcium carbonate, dextrose 10%, dextrose 10%, dextrose 10%, dextrose 10%, glucagon (human recombinant), glucose, glucose, insulin aspart U-100, lipase-protease-amylase 24,000-76,000-120,000 units, melatonin, methocarbamoL, olopatadine, ondansetron, ondansetron, sodium chloride 0.9%    Family History       Problem Relation (Age of Onset)    Heart disease Father, Brother    Hypertension Brother          Tobacco Use    Smoking status: Never     Passive exposure: Never    Smokeless tobacco: Never   Substance and Sexual Activity    Alcohol use: Yes     Comment: socially    Drug use: No    Sexual activity: Yes     Partners: Male     Birth control/protection: Post-menopausal     Review of Systems   Constitutional:  Positive for activity change. Negative for fatigue and fever.   HENT:  Negative for sore throat and trouble swallowing.    Eyes:  Negative for visual disturbance.   Respiratory:  Negative for cough and shortness of breath.    Cardiovascular:  Negative for chest pain and leg swelling.   Gastrointestinal:  Negative for abdominal distention and abdominal pain.   Genitourinary:  Negative for difficulty urinating.   Musculoskeletal:  Positive for gait problem. Negative for back pain.   Skin:  Negative for color change.   Neurological:  Positive for weakness. Negative for dizziness, light-headedness and headaches.   Psychiatric/Behavioral:  Negative for agitation and confusion.      Objective:     Vital Signs (Most Recent):  Temp: 97.9 °F (36.6 °C) (04/01/24 1130)  Pulse: 75 (04/01/24 1423)  Resp: 18 (04/01/24 1423)  BP: 134/60 (04/01/24 1130)  SpO2: 99 % (04/01/24 1423)    Vital Signs (24h Range):  Temp:  [97.5 °F (36.4  °C)-98.3 °F (36.8 °C)] 97.9 °F (36.6 °C)  Pulse:  [72-99] 75  Resp:  [16-20] 18  SpO2:  [96 %-99 %] 99 %  BP: ()/(50-68) 134/60     Body mass index is 15.41 kg/m².     Physical Exam  Vitals and nursing note reviewed.   Constitutional:       Appearance: Normal appearance. She is well-developed.   Eyes:      Pupils: Pupils are equal, round, and reactive to light.   Pulmonary:      Effort: Pulmonary effort is normal. No respiratory distress.   Abdominal:      General: Bowel sounds are normal.      Palpations: Abdomen is soft.   Musculoskeletal:      Cervical back: Normal range of motion and neck supple.      Comments: LLE weaknesss   Skin:     General: Skin is warm and dry.   Neurological:      Mental Status: She is oriented to person, place, and time. Mental status is at baseline.      Motor: Weakness present.      Gait: Gait abnormal.   Psychiatric:         Mood and Affect: Mood normal.         Behavior: Behavior normal.         Thought Content: Thought content normal.          NEUROLOGICAL EXAMINATION:     MENTAL STATUS   Oriented to person, place, and time.     CRANIAL NERVES     CN III, IV, VI   Pupils are equal, round, and reactive to light.      Diagnostic Results: Labs: Reviewed  ECG: Reviewed  CT: Reviewed

## 2024-04-01 NOTE — ASSESSMENT & PLAN NOTE
Patient with acute kidney injury/acute renal failure likely due to pre-renal azotemia due to IVVD ADARSH is currently improving. Baseline creatinine  1.2 to 1.7  - Labs reviewed- Renal function/electrolytes with Estimated Creatinine Clearance: 16.4 mL/min (A) (based on SCr of 1.6 mg/dL (H)). according to latest data. Monitor urine output and serial BMP and adjust therapy as needed. Avoid nephrotoxins and renally dose meds for GFR listed above.    Improved from 2.1 on admit with hyperglycemia likely also contributing to volume depletion and now at 1.7-->1.6

## 2024-04-01 NOTE — ANESTHESIA POST-OP PAIN MANAGEMENT
Acute Pain Service Progress Note    Sussy Costa is a 88 y.o., female, [id number].    Surgery:  INSERTION, INTRAMEDULLARY CAYDEN, FEMUR - Left, (Left: Leg)     Post Op Day #: 1    Catheter type: perineural  SIFI    Infusion type: Ropivacaine 0.2%  15mL q3hr basal    Problem List:    Active Hospital Problems    Diagnosis  POA    *Closed 2-part intertrochanteric fracture of left femur [S72.142A]  Yes    Hypomagnesemia [E83.42]  Yes    Acute blood loss anemia [D62]  Yes    Vitamin D insufficiency [E55.9]  Yes    Constipation [K59.00]  Yes    ADARSH (acute kidney injury) [N17.9]  Yes    Stage 3 chronic kidney disease [N18.30]  Yes    Paroxysmal atrial fibrillation [I48.0]  Yes    Carcinoid bronchial adenoma, 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 On imaging review appears indolent. Plan for surveillance      Pancreatic insufficiency [K86.89]  Yes     Diagnosed in setting of weight loss and bowel incontinence 2022. Has chronic pancreatitis on imaging. Home medications include Creon      Type 2 diabetes mellitus with microalbuminuria, without long-term current use of insulin [E11.29, R80.9]  Yes     Home regimen includes levemir and Novolog. Last A1c 6.9 in 11/2022      Asthma, moderate persistent [J45.40]  Yes     Well controlled on Trelegy, singulair, and albuterol prn.      Senile osteoporosis [M81.0]  Yes    Primary hypertension [I10]  Yes     Home medications include chlorthalidone and irbesartan        Resolved Hospital Problems    Diagnosis Date Resolved POA    Preop cardiovascular exam [Z01.810] 03/31/2024 Not Applicable       Subjective:     General appearance of alert, oriented, no complaints   Pain with rest: 5    Numbers   Pain with movement: 1    Numbers   Side Effects    1. Pruritis No    2. Nausea No    3. Motor Blockade No    4. Sedation No, 1=awake and alert    Objective:     Catheter site clean, dry, intact      Vitals   Vitals:     04/01/24 0400   BP: 110/64   Pulse: 88   Resp: 18   Temp: 36.7 °C (98.1 °F)        Labs    No results displayed because visit has over 200 results.           Meds   Current Facility-Administered Medications   Medication Dose Route Frequency Provider Last Rate Last Admin    0.9%  NaCl infusion (for blood administration)   Intravenous Q24H PRN Washington Martin MD        acetaminophen tablet 1,000 mg  1,000 mg Oral Q6H Deep Blevins MD   1,000 mg at 04/01/24 0604    albuterol sulfate nebulizer solution 2.5 mg  2.5 mg Nebulization Q4H PRN Deep Blevins MD        apixaban tablet 2.5 mg  2.5 mg Oral BID Washington Martin MD   2.5 mg at 03/31/24 2133    ascorbic acid (vitamin C) tablet 500 mg  500 mg Oral Daily Deep Blevins MD        atorvastatin tablet 20 mg  20 mg Oral QHS Deep Blevins MD   20 mg at 03/31/24 2133    bisacodyL suppository 10 mg  10 mg Rectal Daily PRN Deep Blevins MD        ceFAZolin 2 g in dextrose 5 % in water (D5W) 50 mL IVPB (MB+)  2 g Intravenous Q12H Washington Martin  mL/hr at 03/31/24 2129 2 g at 03/31/24 2129    cetirizine tablet 5 mg  5 mg Oral QHS Deep Blevins MD   5 mg at 03/31/24 2133    dextrose 10% bolus 125 mL 125 mL  12.5 g Intravenous PRN Deep Blevins MD        dextrose 10% bolus 125 mL 125 mL  12.5 g Intravenous PRN Glenny Layton MD        dextrose 10% bolus 250 mL 250 mL  25 g Intravenous PRN Deep Blevins MD        dextrose 10% bolus 250 mL 250 mL  25 g Intravenous PRN Glenny Layton MD        fentaNYL 50 mcg/mL injection 25 mcg  25 mcg Intravenous Q5 Min PRN Deep Blevins MD        fluticasone furoate-vilanteroL 200-25 mcg/dose diskus inhaler 1 puff  1 puff Inhalation Daily Deep Blevins MD        And    tiotropium bromide 2.5 mcg/actuation inhaler 2 puff  2 puff Inhalation Daily Deep Blevins MD        gabapentin capsule 100 mg  100 mg Oral BID Deep Blevins MD   100 mg at 03/31/24 2137    glucagon  (human recombinant) injection 1 mg  1 mg Intramuscular PRN Glenny Layton MD        glucose chewable tablet 16 g  16 g Oral PRN Glenny Layton MD        glucose chewable tablet 24 g  24 g Oral PRN Glenny Layton MD        haloperidol lactate injection 0.5 mg  0.5 mg Intravenous Q10 Min PRN Yola Murillo MD        HYDROmorphone injection 0.5 mg  0.5 mg Intravenous Q4H PRN Deep Blevins MD        insulin aspart U-100 pen 0-10 Units  0-10 Units Subcutaneous QID (AC + HS) PRN Glenny Layton MD   4 Units at 03/31/24 1632    insulin aspart U-100 pen 6 Units  6 Units Subcutaneous TIDWM Glenny Layton MD        insulin detemir U-100 (Levemir) pen 7 Units  7 Units Subcutaneous BID Glenny Layton MD        latanoprost 0.005 % ophthalmic solution 1 drop  1 drop Both Eyes QHS Deep Blevins MD   1 drop at 03/31/24 2128    lipase-protease-amylase 24,000-76,000-120,000 units capsule 1 capsule  1 capsule Oral TID PRN Deep Blevins MD        lipase-protease-amylase 24,000-76,000-120,000 units capsule 2 capsule  2 capsule Oral TID  Deep Blevins MD   2 capsule at 03/31/24 1430    magnesium oxide tablet 400 mg  400 mg Oral Daily Deep Blevins MD        melatonin tablet 6 mg  6 mg Oral Nightly PRN Deep Blevins MD        methocarbamoL tablet 500 mg  500 mg Oral Q6H PRN Deep Blevins MD   500 mg at 03/31/24 1342    montelukast tablet 10 mg  10 mg Oral QHS Deep Blevins MD   10 mg at 03/31/24 2133    mupirocin 2 % ointment 1 g  1 g Nasal BID Deep Blevins MD   1 g at 03/31/24 2133    olopatadine 0.1 % ophthalmic solution 1 drop  1 drop Both Eyes BID PRN Deep Blevins MD        ondansetron disintegrating tablet 8 mg  8 mg Oral Q6H PRN Glenny Layton MD   8 mg at 03/31/24 1450    ondansetron injection 4 mg  4 mg Intravenous Q12H PRN Deep Blevins MD   4 mg at 03/31/24 2203    oxyCODONE immediate release tablet 5 mg  5 mg Oral Q3H PRN  Deep Blevins MD   5 mg at 03/31/24 1538    oxyCODONE immediate release tablet 5 mg  5 mg Oral Q3H PRN Yola Murillo MD        polyethylene glycol packet 17 g  17 g Oral Daily Deep Blevins MD   17 g at 03/31/24 1204    ROPIvacaine (PF) 2 mg/ml (0.2%) solution  0.1 mL/hr Perineural Continuous Deep Blevins MD 0.1 mL/hr at 03/31/24 1157 0.1 mL/hr at 03/31/24 1157    senna-docusate 8.6-50 mg per tablet 1 tablet  1 tablet Oral BID Deep Blevins MD   1 tablet at 03/31/24 2133    sodium chloride 0.9% flush 10 mL  10 mL Intravenous PRN Deep Blevins MD        tuberculin injection 5 Units  5 Units Intradermal Once Deep Blevins MD        vitamin D 1000 units tablet 1,000 Units  1,000 Units Oral Daily Deep Blevins MD            Anticoagulant: eliquis 2.5mg BID    Assessment:     Pain control adequate. Patient states she slept well and had minimal pain overnight. She began having some sharp pain this morning when she tried to move her leg; this resolved after tylenol. She is eager to work with therapy today.    Plan:     Patient doing well, continue present treatment.  Continue PNC at current rate.  Continue multimodal pain control - tylenol 1000mg q6hr and robaxin 500mg q6hr PRN.  Discontinued gabapentin in setting of ADARSH on CKD3 - patient states she was prescribed this after a back injury and has not taken it since October.  Avoid narcotics.    We will continue to follow along. Please call with any questions or concerns.    Discussed with staff, Dr. Francois. See full attestation for final recommendations.    Susan Quiles, DO  PGY-3 Anesthesiology

## 2024-04-01 NOTE — SUBJECTIVE & OBJECTIVE
Principal Problem:Closed 2-part intertrochanteric fracture of left femur    Principal Orthopedic Problem: same, s/p CMN 3/31    Interval History: No acute events overnight.  Vitals within normal limits.  Pain adequately controlled this morning while at rest.  Anticipate PT/OT evaluation today.    Review of patient's allergies indicates:   Allergen Reactions    Aspirin Other (See Comments)     Asthma    TRIAD OF NASAL POLYPS, ASA  ALLERGY AND ASTHMA.        Aspirin Other (See Comments)    Nsaids (non-steroidal anti-inflammatory drug) Other (See Comments)     AVOID DUE TO TRIAD OF ASA ALLERGY, NASAL POLYPS,AND ASTHMA  AVOID DUE TO TRIAD OF ASA ALLERGY, NASAL POLYPS,AND ASTHMA    Penicillin      Other reaction(s): Rash    9/30/20: tolerates ceftriaxone    Penicillin g Other (See Comments)     Other reaction(s): Rash    9/30/20: tolerates ceftriaxone       Current Facility-Administered Medications   Medication    0.9%  NaCl infusion (for blood administration)    acetaminophen tablet 1,000 mg    albuterol sulfate nebulizer solution 2.5 mg    apixaban tablet 2.5 mg    ascorbic acid (vitamin C) tablet 500 mg    atorvastatin tablet 20 mg    bisacodyL suppository 10 mg    ceFAZolin 2 g in dextrose 5 % in water (D5W) 50 mL IVPB (MB+)    cetirizine tablet 5 mg    dextrose 10% bolus 125 mL 125 mL    dextrose 10% bolus 125 mL 125 mL    dextrose 10% bolus 250 mL 250 mL    dextrose 10% bolus 250 mL 250 mL    fluticasone furoate-vilanteroL 200-25 mcg/dose diskus inhaler 1 puff    And    tiotropium bromide 2.5 mcg/actuation inhaler 2 puff    glucagon (human recombinant) injection 1 mg    glucose chewable tablet 16 g    glucose chewable tablet 24 g    insulin aspart U-100 pen 0-10 Units    insulin aspart U-100 pen 5 Units    insulin detemir U-100 (Levemir) pen 5 Units    latanoprost 0.005 % ophthalmic solution 1 drop    lipase-protease-amylase 24,000-76,000-120,000 units capsule 1 capsule    lipase-protease-amylase  "24,000-76,000-120,000 units capsule 2 capsule    magnesium oxide tablet 400 mg    melatonin tablet 6 mg    methocarbamoL tablet 500 mg    montelukast tablet 10 mg    mupirocin 2 % ointment 1 g    olopatadine 0.1 % ophthalmic solution 1 drop    ondansetron disintegrating tablet 8 mg    ondansetron injection 4 mg    polyethylene glycol packet 17 g    ROPIvacaine (PF) 2 mg/ml (0.2%) solution    senna-docusate 8.6-50 mg per tablet 1 tablet    sodium chloride 0.9% flush 10 mL    tuberculin injection 5 Units    vitamin D 1000 units tablet 1,000 Units     Objective:     Vital Signs (Most Recent):  Temp: 98.3 °F (36.8 °C) (04/01/24 0719)  Pulse: 81 (04/01/24 0719)  Resp: 18 (04/01/24 0719)  BP: (!) 127/59 (04/01/24 0719)  SpO2: 96 % (04/01/24 0719) Vital Signs (24h Range):  Temp:  [97.5 °F (36.4 °C)-98.3 °F (36.8 °C)] 98.3 °F (36.8 °C)  Pulse:  [] 81  Resp:  [13-29] 18  SpO2:  [96 %-100 %] 96 %  BP: ()/(50-75) 127/59     Weight: 42 kg (92 lb 9.5 oz)  Height: 5' 5" (165.1 cm)  Body mass index is 15.41 kg/m².      Intake/Output Summary (Last 24 hours) at 4/1/2024 0740  Last data filed at 4/1/2024 0608  Gross per 24 hour   Intake 1300 ml   Output 1300 ml   Net 0 ml        Ortho/SPM Exam  Gen: NAD, sitting comfortably in bed  CV: regular rate  Resp: non-labored respirations    LLE:  Dressings clean, dry, and intact  No significant hematoma over operative site  Appropriate postoperative TTP  SILT Sa/Stephens/DP/SP/T  Motor intact EHL/FHL/TA/Gastroc/Quad  2+ DP, 2+ PT       Significant Labs: All pertinent labs within the past 24 hours have been reviewed.    Significant Imaging: I have reviewed and interpreted all pertinent imaging results/findings.  "

## 2024-04-01 NOTE — PT/OT/SLP EVAL
Occupational Therapy   Co-Evaluation  Co-evaluation/treatment performed due to patient's multiple deficits requiring two skilled therapists to appropriately and safely assess patient's strength and endurance while facilitating functional tasks in addition to accommodating for patient's activity tolerance.        Name: Sussy Costa  MRN: [id number]  Admitting Diagnosis: Closed 2-part intertrochanteric fracture of left femur  Recent Surgery: Procedure(s) (LRB):  INSERTION, INTRAMEDULLARY CAYDEN, FEMUR - Left, (Left) 1 Day Post-Op    Recommendations:     Discharge Recommendations: Moderate Intensity Therapy  Discharge Equipment Recommendations:  none  Barriers to discharge:  Other (Comment) (increased assistance required for ADLs & functional mobility/transfers)    Assessment:     Sussy Costa is a 88 y.o. female with a medical diagnosis of Closed 2-part intertrochanteric fracture of left femur.  She presents with the following performance deficits affecting function: weakness, impaired endurance, impaired self care skills, impaired functional mobility, gait instability, pain, decreased lower extremity function, orthopedic precautions. Pt agreeable to therapy and tolerated fairly. However, pt remains limited in ADLs, functional mobility, and functional transfers and is currently not performing tasks at [facility]. Pt would continue to benefit from skilled OT services to maximize functional independence with ADLs and functional mobility, reduce caregiver burden, and facilitate safe discharge in the least restrictive environment.    Rehab Prognosis: Good; patient would benefit from acute skilled OT services to address these deficits and reach maximum level of function.       Plan:     Patient to be seen daily to address the above listed problems via self-care/home management, therapeutic activities, therapeutic exercises  Plan of Care Expires: 04/30/24  Plan of Care Reviewed with: patient    Subjective     Chief Complaint: L  LE pain  Patient/Family Comments/goals: return to PLOF    Occupational Profile:  Living Environment: pt lives with  in [facility] with small threshold. Bathroom: tub/shower & walk-in shower  Previous level of function: Independent  Roles and Routines: pt cooks, cleans & enjoys sewing  Equipment Used at Home: shower chair, wheelchair, walker, rolling  Assistance upon Discharge:  & son-in law    Pain/Comfort:  Pain Rating 1: 3/10  Location - Side 1: Left  Location - Orientation 1: generalized  Location 1: hip  Pain Addressed 1: Reposition, Distraction  Pain Rating Post-Intervention 1: 5/10    Patients cultural, spiritual, Orthodoxy conflicts given the current situation: no    Objective:     Communicated with: RN prior to session.  Patient found HOB elevated with perineural catheter, SCD, telemetry upon OT entry to room.    General Precautions: Standard, fall  Orthopedic Precautions: LLE weight bearing as tolerated  Braces: N/A  Respiratory Status: Room air    Occupational Performance:    Bed Mobility:    Patient completed Scooting to EOB with moderate assistance  Patient completed Supine to Sit with moderate assistance    Functional Mobility/Transfers:  Patient completed Sit <> Stand Transfer with minimum assistance  with  rolling walker   Patient completed Bed <> Chair Transfer using Step Transfer technique with contact guard assistance with rolling walker    Activities of Daily Living:  Upper Body Dressing: minimum assistance to don gown over back while seated at EOB  Lower Body Dressing: total assistance to don/dof socks    Cognitive/Visual Perceptual:  Cognitive/Psychosocial Skills:     -       Follows Commands/attention:Follows multistep  commands    Physical Exam:  Upper Extremity Range of Motion:     -       Right Upper Extremity: WFL  -       Left Upper Extremity: WFL  Upper Extremity Strength:    -       Right Upper Extremity: WFL  -       Left Upper Extremity: WFL   Strength:    -       Right  Upper Extremity: WFL  -       Left Upper Extremity: WFL  Fine Motor Coordination:    -       Intact  Left hand thumb/finger opposition skills and Right hand thumb/finger opposition skills  Gross motor coordination:   WFL    AMPAC 6 Click ADL:  AMPAC Total Score: 16    Treatment & Education:  -Pt educated on hand placement for transfers  -Pt educated on RW placement during functional transfers/mobility  -Pt educated on weightbearing and surgical precautions with pt verbalizing understanding  -Education on task modification to maximize safety and (I) during ADLs and mobility  -Education on importance of OOB activity to improve overall activity tolerance and promote recovery  -Pt educated to call for assistance and to transfer with hospital staff only  -Provided education regarding role of OT & POC with pt verbalizing understanding. Pt had no further questions & when asked whether there were any concerns pt reported none.     Patient left up in chair with all lines intact, call button in reach, RN notified, and son in-law present    GOALS:   Multidisciplinary Problems       Occupational Therapy Goals          Problem: Occupational Therapy    Goal Priority Disciplines Outcome Interventions   Occupational Therapy Goal     OT, PT/OT Ongoing, Progressing    Description: Goals to be met by: 04/30/2024     Patient will increase functional independence with ADLs by performing:    UE Dressing with Set-up Assistance.  LE Dressing with Modified [place].  Grooming while bedside chair with Set-up Assistance.  Toileting from bedside commode with Stand-by Assistance for hygiene and clothing management.   Supine to sit with Modified [place].  Step transfer with Contact Guard Assistance                         History:     Past Medical History:   Diagnosis Date    Asthma     Cyst of pancreas     liver    Diabetes mellitus type II     Eczema of hand     Glaucoma     History of colon cancer 10/28/2022    Right sided colon  cancer diagnosed in setting of LBO requiring urgent hemicolectomy 10/25/2022 with path showing pT4aN0 disease. CT chest 12/7/22 with possible lung metastases and MR liver with indeterminate liver lesions. 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 Patient elected for surveillance.       History of recurrent pneumonia 09/28/2020    Hyperlipidemia     Hypertension     Lichen planus     gums    Osteoporosis     Other chronic pancreatitis 03/01/2023    Pancreas cyst 03/18/2016    Pulmonary nodules     Spinal stenosis of lumbar region 08/30/2018         Past Surgical History:   Procedure Laterality Date    BRONCHOSCOPY N/A 5/13/2022    Procedure: Bronchoscopy;  Surgeon: [facility] Diagnostic Provider;  Location: [facility] OR [address] [place] [address];  Service: Anesthesiology;  Laterality: N/A;    CATARACT EXTRACTION, BILATERAL      CHOLECYSTECTOMY      COLECTOMY, RIGHT Right 10/25/2022    Procedure: COLECTOMY, RIGHT;  Surgeon: Jass Holman MD;  [facility];  Service: Colon and Rectal;  Laterality: Right;    COLONOSCOPY N/A 6/26/2023    Procedure: COLONOSCOPY;  Surgeon: Jass Holman MD;  [facility]  Service: Endoscopy;  Laterality: N/A;  2nd floor -lung disease  referral Dr [id number] portal/mailed-GT  6/20/23- Precall confirmed- [place]    ENDOSCOPIC ULTRASOUND OF UPPER GASTROINTESTINAL TRACT N/A 4/22/2022    Procedure: ULTRASOUND, UPPER GI TRACT, ENDOSCOPIC;  Surgeon: Mxa Rosado MD;  [facility]  Service: Endoscopy;  Laterality: N/A;  urgent EUS (linear) for abnormal CT scan with dilated PD and increased cyst of pancreas cyst.  pap 44 mmHg  4/13:fully vaccinated. instructions via portal-SC    EPIDURAL STEROID INJECTION INTO LUMBAR SPINE N/A 11/8/2018    Procedure: Injection-steroid-epidural-lumbar L5-S1;  Surgeon: Nicci Osorio Jr., MD;  Location: [facility] PAIN MGT;  Service: Pain  Management;  Laterality: N/A;    FUNCTIONAL ENDOSCOPIC SINUS SURGERY (FESS) USING COMPUTER-ASSISTED NAVIGATION N/A 6/17/2019    Procedure: FESS, USING COMPUTER-ASSISTED NAVIGATION;  Surgeon: Rosangela Isabel MD;  Location: [facility];  Service: ENT;  Laterality: N/A;  video    HYSTERECTOMY      INTRAMEDULLARY RODDING OF FEMUR Left 3/31/2024    Procedure: INSERTION, INTRAMEDULLARY CAYDEN, FEMUR - Left,;  Surgeon: Nakul Walker MD;  Location: [address];  Service: Orthopedics;  Laterality: Left;    nasal polyps         Time Tracking:     OT Date of Treatment: 04/01/24  OT Start Time: 1032  OT Stop Time: 1056  OT Total Time (min): 24 min    Billable Minutes:Evaluation 10  Therapeutic Activity 14    4/1/2024

## 2024-04-01 NOTE — ASSESSMENT & PLAN NOTE
Patient's anemia is currently controlled. Has received 1 units of PRBCs on 3/31 PM post op with improvement in BP and Hg to 8.2 . Etiology likely d/t acute blood loss which was from surgical losses and inflammation from fracture with baseline anemia at 9.9  Current CBC reviewed-   Lab Results   Component Value Date    HGB 8.2 (L) 04/01/2024    HCT 24.8 (L) 04/01/2024     Monitor serial CBC and transfuse if patient becomes hemodynamically unstable, symptomatic or H/H drops below 7/21.

## 2024-04-01 NOTE — CONSULTS
[facility] - Surgery  Physical Medicine & Rehab  Consult Note    Patient Name: Sussy Costa  MRN: [id number]  Admission Date: 3/30/2024  Hospital Length of Stay: 2 days  Attending Physician: Glenny Layton MD     Inpatient consult to Physical Medicine & Rehabilitation  Consult performed by: Marisol Hernández NP  Consult requested by:  Glenny Layton MD    Collaborating Physician: Mable Newton MD  Reason for Consult:  Assess rehabilitation needs      Consults  Subjective:     Principal Problem: Closed 2-part intertrochanteric fracture of left femur    HPI: Sussy Costa is an 88-year-old female with PMHx of Type 2 DM, CKD, HTN, pancreatic insufficiency, asthma, a fib. Patient presented to [facility] on 3/30/24 for a mechanical fall with left hip pain. Upon arrival, found to have a intertrochanteric fracture of left femur. S/p CMN 3/31/24. Per Ortho WBAT to LLE. DVT ppx Eliquis.     Functional History: Patient lives with  in a single story home with small step to enter.  Prior to admission, Max. DME: [facility] w/c.     Hospital Course: 4/1/24: Participated w/ OT. Bed mob modA. Sit <> Stand Transfer with minimum assistance  with  rolling walker     Past Medical History:   Diagnosis Date    Asthma     Cyst of pancreas     liver    Diabetes mellitus type II     Eczema of hand     Glaucoma     History of colon cancer 10/28/2022    Right sided colon cancer diagnosed in setting of LBO requiring urgent hemicolectomy 10/25/2022 with path showing pT4aN0 disease. CT chest 12/7/22 with possible lung metastases and MR liver with indeterminate liver lesions. 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 Patient elected for surveillance.       History of recurrent pneumonia 09/28/2020    Hyperlipidemia     Hypertension     Lichen planus     gums    Osteoporosis     Other chronic pancreatitis 03/01/2023    Pancreas cyst 03/18/2016     Pulmonary nodules     Spinal stenosis of lumbar region 08/30/2018     Past Surgical History:   Procedure Laterality Date    BRONCHOSCOPY N/A 5/13/2022    Procedure: Bronchoscopy;  Surgeon: [facility] Diagnostic Provider;  [facility];  Service: Anesthesiology;  Laterality: N/A;    CATARACT EXTRACTION, BILATERAL      CHOLECYSTECTOMY      COLECTOMY, RIGHT Right 10/25/2022    Procedure: COLECTOMY, RIGHT;  Surgeon: Jass Holman MD;  [facility];  Service: Colon and Rectal;  Laterality: Right;    COLONOSCOPY N/A 6/26/2023    Procedure: COLONOSCOPY;  Surgeon: Jass Holman MD;  Location: [facility] ENDO (2ND FLR);  Service: Endoscopy;  Laterality: N/A;  2nd floor -lung disease  referral Dr [id number] portal/mailed-GT  6/20/23- Precall confirmed- [place]    ENDOSCOPIC ULTRASOUND OF UPPER GASTROINTESTINAL TRACT N/A 4/22/2022    Procedure: ULTRASOUND, UPPER GI TRACT, ENDOSCOPIC;  Surgeon: Max Rosado MD;  Location: [facility] ENDO (2ND FLR);  Service: Endoscopy;  Laterality: N/A;  urgent EUS (linear) for abnormal CT scan with dilated PD and increased cyst of pancreas cyst.  pap 44 mmHg  4/13:fully vaccinated. instructions via portal-SC    EPIDURAL STEROID INJECTION INTO LUMBAR SPINE N/A 11/8/2018    Procedure: Injection-steroid-epidural-lumbar L5-S1;  Surgeon: Nicci Osorio Jr., MD;  Location: [facility] PAIN MGT;  Service: Pain Management;  Laterality: N/A;    FUNCTIONAL ENDOSCOPIC SINUS SURGERY (FESS) USING COMPUTER-ASSISTED NAVIGATION N/A 6/17/2019    Procedure: FESS, USING COMPUTER-ASSISTED NAVIGATION;  Surgeon: Rosangela Isabel MD;  Location: [facility] OR;  Service: ENT;  Laterality: N/A;  video    HYSTERECTOMY      INTRAMEDULLARY RODDING OF FEMUR Left 3/31/2024    Procedure: INSERTION, INTRAMEDULLARY CAYDEN, FEMUR - Left,;  Surgeon: Nakul Walker MD;  [address];  Service: Orthopedics;  Laterality: Left;    nasal polyps       Review of patient's allergies indicates:    Allergen Reactions    Aspirin Other (See Comments)     Asthma    TRIAD OF NASAL POLYPS, ASA  ALLERGY AND ASTHMA.        Aspirin Other (See Comments)    Nsaids (non-steroidal anti-inflammatory drug) Other (See Comments)     AVOID DUE TO TRIAD OF ASA ALLERGY, NASAL POLYPS,AND ASTHMA  AVOID DUE TO TRIAD OF ASA ALLERGY, NASAL POLYPS,AND ASTHMA    Penicillin      Other reaction(s): Rash    9/30/20: tolerates ceftriaxone    Penicillin g Other (See Comments)     Other reaction(s): Rash    9/30/20: tolerates ceftriaxone       Scheduled Medications:    acetaminophen  1,000 mg Oral Q6H    apixaban  2.5 mg Oral BID    ascorbic acid (vitamin C)  500 mg Oral Daily    atorvastatin  20 mg Oral QHS    ceFAZolin (Ancef) IV (PEDS and ADULTS)  2 g Intravenous Q12H    cetirizine  5 mg Oral QHS    fluticasone furoate-vilanteroL  1 puff Inhalation Daily    And    tiotropium bromide  2 puff Inhalation Daily    insulin aspart U-100  5 Units Subcutaneous TIDWM    insulin detemir U-100  5 Units Subcutaneous BID    latanoprost  1 drop Both Eyes QHS    lipase-protease-amylase 24,000-76,000-120,000 units  2 capsule Oral TID WM    magnesium oxide  400 mg Oral Daily    montelukast  10 mg Oral QHS    mupirocin  1 g Nasal BID    polyethylene glycol  17 g Oral Daily    senna-docusate 8.6-50 mg  1 tablet Oral BID    tuberculin  5 Units Intradermal Once    vitamin D  1,000 Units Oral Daily       PRN Medications: 0.9%  NaCl infusion (for blood administration), albuterol sulfate, bisacodyL, calcium carbonate, dextrose 10%, dextrose 10%, dextrose 10%, dextrose 10%, glucagon (human recombinant), glucose, glucose, insulin aspart U-100, lipase-protease-amylase 24,000-76,000-120,000 units, melatonin, methocarbamoL, olopatadine, ondansetron, ondansetron, sodium chloride 0.9%    Family History       Problem Relation (Age of Onset)    Heart disease Father, Brother    Hypertension Brother          Tobacco Use    Smoking status: Never     Passive exposure:  Never    Smokeless tobacco: Never   Substance and Sexual Activity    Alcohol use: Yes     Comment: socially    Drug use: No    Sexual activity: Yes     Partners: Male     Birth control/protection: Post-menopausal     Review of Systems   Constitutional:  Positive for activity change. Negative for fatigue and fever.   HENT:  Negative for sore throat and trouble swallowing.    Eyes:  Negative for visual disturbance.   Respiratory:  Negative for cough and shortness of breath.    Cardiovascular:  Negative for chest pain and leg swelling.   Gastrointestinal:  Negative for abdominal distention and abdominal pain.   Genitourinary:  Negative for difficulty urinating.   Musculoskeletal:  Positive for gait problem. Negative for back pain.   Skin:  Negative for color change.   Neurological:  Positive for weakness. Negative for dizziness, light-headedness and headaches.   Psychiatric/Behavioral:  Negative for agitation and confusion.      Objective:     Vital Signs (Most Recent):  Temp: 97.9 °F (36.6 °C) (04/01/24 1130)  Pulse: 75 (04/01/24 1423)  Resp: 18 (04/01/24 1423)  BP: 134/60 (04/01/24 1130)  SpO2: 99 % (04/01/24 1423)    Vital Signs (24h Range):  Temp:  [97.5 °F (36.4 °C)-98.3 °F (36.8 °C)] 97.9 °F (36.6 °C)  Pulse:  [72-99] 75  Resp:  [16-20] 18  SpO2:  [96 %-99 %] 99 %  BP: ()/(50-68) 134/60     Body mass index is 15.41 kg/m².     Physical Exam  Vitals and nursing note reviewed.   Constitutional:       Appearance: Normal appearance. She is well-developed.   Eyes:      Pupils: Pupils are equal, round, and reactive to light.   Pulmonary:      Effort: Pulmonary effort is normal. No respiratory distress.   Abdominal:      General: Bowel sounds are normal.      Palpations: Abdomen is soft.   Musculoskeletal:      Cervical back: Normal range of motion and neck supple.      Comments: LLE weaknesss   Skin:     General: Skin is warm and dry.   Neurological:      Mental Status: She is oriented to person, place, and time.  Mental status is at baseline.      Motor: Weakness present.      Gait: Gait abnormal.   Psychiatric:         Mood and Affect: Mood normal.         Behavior: Behavior normal.         Thought Content: Thought content normal.     Diagnostic Results:   Labs: Reviewed  ECG: Reviewed  CT: Reviewed    Assessment/Plan:     * Closed 2-part intertrochanteric fracture of left femur  - S/p CMN 3/31/24.   - Per Ortho WBAT to LLE.   - DVT ppx Eliquis.     Constipation  - BM pending     Impaired mobility  - Related to prolonged/acute hospital course.     Recommendations  -  Encourage mobility, OOB in chair at least 3 hours per day, and early ambulation as appropriate  -  PT/OT evaluate and treat  -  Pain management  -  Monitor for and prevent skin breakdown and pressure ulcers  Early mobility, repositioning/weight shifting every 20-30 minutes when sitting, turn patient every 2 hours, proper mattress/overlay and chair cushioning, pressure relief/heel protector boots  -  DVT prophylaxis    -  Reviewed discharge options (IP rehab, SNF, HH therapy, and OP therapy)     PM&R Recommendation:     At this time, the PM&R team has reviewed this patient's ongoing medical case including inpatient diagnosis, medical history, clinical examination, labs, vitals, current social and functional history to provide the post-acute recommendation as follows:     RECOMMENDATIONS: inpatient rehabilitation due to good motivation/participation with therapies, has been determined to tolerate 3 hours of therapy and good potential for recovery.     The patient will be admitted for comprehensive interdisciplinary inpatient rehabilitation to address the impairments due to medical diagnosis of L femur fracture s/p ORIF. The patient will benefit from an inpatient rehabilitation program to promote functional recovery, implement compensatory strategies and will undergo assessment for needs for durable medical equipment for safe discharge to the community. This  patient will benefit from a coordinated interdisciplinary rehabilitation program services that require close monitoring and treatment with 24-hour rehabilitative nursing and physical/occupational therapies for 3 hours/day for 5 days/week.This interdisciplinary program will be performed under the direction of a physiatrist.    MEDICAL STABILITY:     At this time, barriers for post-acute rehab admission include post op BM and pain control.    We will continue to follow.     Thank you for your consult.     Marisol Hernández NP  Department of Physical Medicine & Rehab  [facility]

## 2024-04-01 NOTE — ASSESSMENT & PLAN NOTE
Sussy Costa is a 88 y.o. female with a left intertrochanteric femur fracture. She is closed and neurovascularly intact. Now s/p left femur CMN on 3/31/24 with Dr. Walker. Doing well this morning.    Plan:  Pain control: multimodal, PNC per Anesthesia  PT/OT: WBAT LLE  DVT PPx: Eliquis 2.5 mg BID, SCDs at all times when not ambulating  Abx: postop Ancef x 24 hours  Labs: hemoglobin 8.2; continue to trend  Guevara: remove 4/1/24    Dispo: f/u PT/OT recommendations and hemoglobin trend

## 2024-04-01 NOTE — ASSESSMENT & PLAN NOTE
Chronic, controlled. Latest blood pressure and vitals reviewed-   Home meds for hypertension were reviewed and noted below.   Hypertension Medications               chlorthalidone (HYGROTEN) 25 MG Tab Take 1 tablet (25 mg total) by mouth once daily.    irbesartan (AVAPRO) 300 MG tablet Take 1 tablet (300 mg total) by mouth every evening.            While in the hospital, will manage blood pressure as follows; Adjust home antihypertensive regimen as follows- Bp very high to 200s in ER then improved on admit to 140s pre op, dc to 90s in post op period on floor and BP meds were not given (home arb) and improved with 1 U PRB + fluid bolus.  Trending now and in 120s systolic so watch for when higher to resume home arb with biju improved

## 2024-04-01 NOTE — CARE UPDATE
I have reviewed the chart of Sussy Costa and collaborated with Glenny Layton MD in the care of the patient who is hospitalized for the following:    Active Hospital Problems    Diagnosis    *Closed 2-part intertrochanteric fracture of left femur    Body mass index (BMI) less than 19    Thrombocytopenia    Hypomagnesemia    Acute blood loss anemia    Vitamin D insufficiency    Constipation    ADARSH (acute kidney injury)    Stage 3 chronic kidney disease    Paroxysmal atrial fibrillation    Carcinoid bronchial adenoma, right     Incidentally noted on imaging Undergoing surveillance 12/2022 and biopsy proven 01/2023. On imaging review appears indolent. Plan for surveillance      Pancreatic insufficiency     Diagnosed in setting of weight loss and bowel incontinence 2022. Has chronic pancreatitis on imaging. Home medications include Creon      Type 2 diabetes mellitus with microalbuminuria, without long-term current use of insulin     Home regimen includes levemir and Novolog. Last A1c 6.9 in 11/2022      Asthma, moderate persistent     Well controlled on Trelegy, singulair, and albuterol prn.      Severe protein-calorie malnutrition     Bmi of 15.41, hypoalbuminemia       Senile osteoporosis    Primary hypertension     Home medications include chlorthalidone and irbesartan            I have reviewed Sussy Costa with the multidisciplinary team during discharge huddle.       Megan Box PA-C  [facility]

## 2024-04-01 NOTE — PLAN OF CARE
Problem: Adult Inpatient Plan of Care  Goal: Plan of Care Review  Outcome: Ongoing, Progressing  Goal: Patient-Specific Goal (Individualized)  Outcome: Ongoing, Progressing  Goal: Absence of Hospital-Acquired Illness or Injury  Outcome: Ongoing, Progressing  Goal: Optimal Comfort and Wellbeing  Outcome: Ongoing, Progressing  Goal: Readiness for Transition of Care  Outcome: Ongoing, Progressing     Problem: Infection  Goal: Absence of Infection Signs and Symptoms  Outcome: Ongoing, Progressing     Problem: Diabetes Comorbidity  Goal: Blood Glucose Level Within Targeted Range  Outcome: Ongoing, Progressing     Problem: Fall Injury Risk  Goal: Absence of Fall and Fall-Related Injury  Outcome: Ongoing, Progressing     Problem: Adjustment to Injury (Hip Fracture Medical Management)  Goal: Optimal Coping with Change in Health Status  Outcome: Ongoing, Progressing     Problem: Bleeding (Hip Fracture Medical Management)  Goal: Absence of Bleeding  Outcome: Ongoing, Progressing     Problem: Bowel Elimination Impaired (Hip Fracture Medical Management)  Goal: Effective Bowel Elimination  Outcome: Ongoing, Progressing     Problem: Cognitive Decline Risk (Hip Fracture Medical Management)  Goal: Baseline Cognitive Function Maintained  Outcome: Ongoing, Progressing     Problem: Embolism (Hip Fracture Medical Management)  Goal: Absence of Embolism  Outcome: Ongoing, Progressing     Problem: Fracture Stabilization and Management (Hip Fracture Medical Management)  Goal: Fracture Stability  Outcome: Ongoing, Progressing     Problem: Functional Ability Impaired (Hip Fracture Medical Management)  Goal: Optimal Functional Performance  Outcome: Ongoing, Progressing     Problem: Pain (Hip Fracture Medical Management)  Goal: Acceptable Pain Level  Outcome: Ongoing, Progressing     Problem: Urinary Elimination Impaired (Hip Fracture Medical Management)  Goal: Effective Urinary Elimination  Outcome: Ongoing, Progressing     Problem:  Bleeding (Surgery Nonspecified)  Goal: Absence of Bleeding  Outcome: Ongoing, Progressing     Problem: Bowel Motility Impaired (Surgery Nonspecified)  Goal: Effective Bowel Elimination  Outcome: Ongoing, Progressing     Problem: Fluid and Electrolyte Imbalance (Surgery Nonspecified)  Goal: Fluid and Electrolyte Balance  Outcome: Ongoing, Progressing     Problem: Glycemic Control Impaired (Surgery Nonspecified)  Goal: Blood Glucose Level Within Targeted Range  Outcome: Ongoing, Progressing     Problem: Infection (Surgery Nonspecified)  Goal: Absence of Infection Signs and Symptoms  Outcome: Ongoing, Progressing     Problem: Ongoing Anesthesia Effects (Surgery Nonspecified)  Goal: Anesthesia/Sedation Recovery  Outcome: Ongoing, Progressing     Problem: Pain (Surgery Nonspecified)  Goal: Acceptable Pain Control  Outcome: Ongoing, Progressing     Problem: Postoperative Nausea and Vomiting (Surgery Nonspecified)  Goal: Nausea and Vomiting Relief  Outcome: Ongoing, Progressing     Problem: Postoperative Urinary Retention (Surgery Nonspecified)  Goal: Effective Urinary Elimination  Outcome: Ongoing, Progressing     Problem: Respiratory Compromise (Surgery Nonspecified)  Goal: Effective Oxygenation and Ventilation  Outcome: Ongoing, Progressing     Problem: Device-Related Complication Risk (Anesthesia/Analgesia, Neuraxial)  Goal: Safe Infusion Delivery Completion  Outcome: Ongoing, Progressing     Problem: Infection (Anesthesia/Analgesia, Neuraxial)  Goal: Absence of Infection Signs and Symptoms  Outcome: Ongoing, Progressing     Problem: Nausea and Vomiting (Anesthesia/Analgesia, Neuraxial)  Goal: Nausea and Vomiting Relief  Outcome: Ongoing, Progressing     Problem: Pain (Anesthesia/Analgesia, Neuraxial)  Goal: Effective Pain Control  Outcome: Ongoing, Progressing     Problem: Respiratory Compromise (Anesthesia/Analgesia, Neuraxial)  Goal: Effective Oxygenation and Ventilation  Outcome: Ongoing, Progressing     Problem:  Sensorimotor Impairment (Anesthesia/Analgesia, Neuraxial)  Goal: Baseline Motor Function  Outcome: Ongoing, Progressing     Problem: Urinary Retention (Anesthesia/Analgesia, Neuraxial)  Goal: Effective Urinary Elimination  Outcome: Ongoing, Progressing     Problem: Impaired Wound Healing  Goal: Optimal Wound Healing  Outcome: Ongoing, Progressing     Problem: Fluid and Electrolyte Imbalance (Acute Kidney Injury/Impairment)  Goal: Fluid and Electrolyte Balance  Outcome: Ongoing, Progressing     Problem: Oral Intake Inadequate (Acute Kidney Injury/Impairment)  Goal: Optimal Nutrition Intake  Outcome: Ongoing, Progressing     Problem: Renal Function Impairment (Acute Kidney Injury/Impairment)  Goal: Effective Renal Function  Outcome: Ongoing, Progressing     BG monitored/controlled. Arrived from surgery calm, alert, comfortable. BP dropped but pt asymptomatic, MD notified. BP corrected after blood administration and ordered bolus. Pt received 1 unit PRBCs. Dressing c/d/I. Neurovascular assessment WNL. No s/s of infection. Free from new injury. SCDs in place. Pain adequately managed. No c/o of n/v. Fluid intake encouraged.

## 2024-04-01 NOTE — ED NOTES
Sussy MILES [place], a 88 y.o. female presents to the ED w/ complaint of falling on her left side and hurting her left hip area, patient is not able to straighten her left leg at this time. Patient complaints of pain rated 10 out of 10 at this time.  at bedside along with son in law.   Triage note:  Chief Complaint   Patient presents with    Fall     Pt. Had a trip and fall falling to the left side and left hip.  Non weight bearing, unwilling to straighten left leg.   mg/dL. Did not hit head, no blood thinners on board.     Review of patient's allergies indicates:   Allergen Reactions    Aspirin Other (See Comments)     Asthma    TRIAD OF NASAL POLYPS, ASA  ALLERGY AND ASTHMA.        Aspirin Other (See Comments)    Nsaids (non-steroidal anti-inflammatory drug) Other (See Comments)     AVOID DUE TO TRIAD OF ASA ALLERGY, NASAL POLYPS,AND ASTHMA  AVOID DUE TO TRIAD OF ASA ALLERGY, NASAL POLYPS,AND ASTHMA    Penicillin      Other reaction(s): Rash    9/30/20: tolerates ceftriaxone    Penicillin g Other (See Comments)     Other reaction(s): Rash    9/30/20: tolerates ceftriaxone     Past Medical History:   Diagnosis Date    Asthma     Cyst of pancreas     liver    Diabetes mellitus type II     Eczema of hand     Glaucoma     History of colon cancer 10/28/2022    Right sided colon cancer diagnosed in setting of LBO requiring urgent hemicolectomy 10/25/2022 with path showing pT4aN0 disease. CT chest 12/7/22 with possible lung metastases and MR liver with indeterminate liver lesions. 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 Patient elected for surveillance.       History of recurrent pneumonia 09/28/2020    Hyperlipidemia     Hypertension     Lichen planus     gums    Osteoporosis     Other chronic pancreatitis 03/01/2023    Pancreas cyst 03/18/2016    Pulmonary nodules     Spinal stenosis of lumbar region 08/30/2018

## 2024-04-01 NOTE — PLAN OF CARE
Problem: Occupational Therapy  Goal: Occupational Therapy Goal  Description: Goals to be met by: 04/30/2024     Patient will increase functional independence with ADLs by performing:    UE Dressing with Set-up Assistance.  LE Dressing with Modified Rush.  Grooming while bedside chair with Set-up Assistance.  Toileting from bedside commode with Stand-by Assistance for hygiene and clothing management.   Supine to sit with Modified Rush.  Step transfer with Contact Guard Assistance    Outcome: Ongoing, Progressing     OT eval complete & goals established.

## 2024-04-01 NOTE — ASSESSMENT & PLAN NOTE
She presents with uncontrolled hyperglycemia, no anion gap VBG demonstrates stable PH no ketonemia.  Her serum osmolality is 336, no altered mental status.  And based on calculating osmolality, gap is 14 within normal range.  no HHS at this time no other acute symptoms of hyperglycemic crisis.  -given 6 u of levemir on admit with glucose still 300s on 3/31 AM. Will adjust to 9 U pre op and titrate further post op in PM. Will f/u post op glucose but do at least 6 U of novolog with meals possibly more pending trend. Anesthesia asked to avoid steroids intra-op for intubation and appears did not receive.  -glucose in 80s on 3/31 PM and 4/1 AM, held levemir on 3/31 PM given this, had 10 U sliding scale on 3/31 PM, dec levemir to 5 U BID now and novolog 5 U close to home dose now, and glucose 130 after breakfast so new dosing started this AM and will trend closely given lability may likely need further titration  -reports at home has some lows to 80s and some highs to 300s but never 500s  -A1c 7.4  -DM diet

## 2024-04-01 NOTE — ADDENDUM NOTE
Addendum  created 04/01/24 0849 by Susan Quiles, DO    Clinical Note Signed, Pend clinical note

## 2024-04-01 NOTE — NURSING
Nurses Note -- 4 Eyes      4/1/2024   5:03 AM      Skin assessed during: Q Shift Change      [x] No Altered Skin Integrity Present    []Prevention Measures Documented      [] Yes- Altered Skin Integrity Present or Discovered   [] LDA Added if Not in Epic (Describe Wound)   [] New Altered Skin Integrity was Present on Admit and Documented in LDA   [] Wound Image Taken    Wound Care Consulted? No    Attending Nurse:  Corona Arrieta RN/Staff Member:  Loren ARAUJO

## 2024-04-01 NOTE — HOSPITAL COURSE
4/1/24: Participated w/ OT. Bed mob modA. Sit <> Stand Transfer with minimum assistance  with  rolling walker

## 2024-04-01 NOTE — PLAN OF CARE
Problem: Adult Inpatient Plan of Care  Goal: Plan of Care Review  Outcome: Ongoing, Progressing  Goal: Patient-Specific Goal (Individualized)  Outcome: Ongoing, Progressing  Goal: Absence of Hospital-Acquired Illness or Injury  Outcome: Ongoing, Progressing  Goal: Optimal Comfort and Wellbeing  Outcome: Ongoing, Progressing  Goal: Readiness for Transition of Care  Outcome: Ongoing, Progressing   Patient VSS, AAOX4, NADN Bed in lowest position, call light in reach along with personal items. Plan of care ongoing.

## 2024-04-01 NOTE — ASSESSMENT & PLAN NOTE
Creatine stable for now. BMP reviewed- noted Estimated Creatinine Clearance: 16.4 mL/min (A) (based on SCr of 1.6 mg/dL (H)). according to latest data. Based on current GFR, CKD stage is stage 3 - GFR 30-59.  Monitor UOP and serial BMP and adjust therapy as needed. Renally dose meds. Avoid nephrotoxic medications and procedures.  Baseline Cr 1.2 to 1.7 with Fahad with Cr 2. 1 on admit. Given IVF on admit with improving Cr now to 1.6 back within baseline ranges  -on ARB at home, hold until BP stable

## 2024-04-01 NOTE — PLAN OF CARE
[facility] - Surgery  Discharge Reassessment    Primary Care Provider: PAULA Casillas MD    Expected Discharge Date: 4/3/2024    Referrals for inpatient rehab placement sent.    PT/OT recommendations pending.    Reassessment (most recent)       Discharge Reassessment - 04/01/24 1201          Discharge Reassessment    Assessment Type Discharge Planning Reassessment     Did the patient's condition or plan change since previous assessment? No     Discharge Plan A Rehab     Discharge Plan B Skilled Nursing Facility     DME Needed Upon Discharge  none     Transition of Care Barriers None        Post-Acute Status    Post-Acute Authorization Placement     Post-Acute Placement Status Referrals Sent

## 2024-04-01 NOTE — PLAN OF CARE
PT eval completed- see note for details, goals and POC established.     Problem: Physical Therapy  Goal: Physical Therapy Goal  Description: Goals to be met by: 2024    Patient will increase functional independence with mobility by performin. Supine to sit with Stand-by Assistance  2. Sit to stand transfer with Stand-by Assistance  3. Bed to chair transfer with Supervision using Rolling Walker  4. Gait  x 100 feet with Stand-by Assistance using Rolling Walker.   5. Lower extremity exercise program x30 reps per handout, with independence    Outcome: Ongoing, Progressing   2024

## 2024-04-01 NOTE — ASSESSMENT & PLAN NOTE
-sustained in mechanical fall and IT fx found on admit.  Ortho with OR 3/31 with dr garcia with IM nail with WBAT post op with PT/OT on POD 1 and recs pending, Pm and R consulted to see if rehab candidate  -multimodals for pain control with anesthesia managing with ropivicaine placed  -bowel regimen with large stool burden seen on CT on admit  -vit D mildly low at 27 and replacement started  -ca out on POD 1 today  -eliquis for 35 days post op for dvt ppx with end date 4/29   Improved

## 2024-04-01 NOTE — PT/OT/SLP EVAL
"Physical Therapy Co-Evaluation and Treatment    OT present for coeval due to pt's multiple medical comorbidities and functional/cognition deficits requiring two skilled therapists to appropriately progress pt's musculoskeletal strength, neuromuscular control, and endurance while taking into consideration medical acuity and pt safety.    Patient Name: Sussy Costa   MRN: [id number]  Recent Surgery: Procedure(s) (LRB):  INSERTION, INTRAMEDULLARY CAYEDN, FEMUR - Left, (Left) 1 Day Post-Op    Recommendations:     Discharge Recommendations: Moderate Intensity Therapy   Discharge Equipment Recommendations: none   Barriers to discharge: None    Assessment:     Sussy Costa is a 88 y.o. female admitted with a medical diagnosis of Closed 2-part intertrochanteric fracture of left femur. She presents with the following impairments/functional limitations: weakness, impaired endurance, orthopedic precautions, decreased lower extremity function, impaired self care skills, impaired functional mobility, gait instability, impaired balance, pain.     Pt receptive and tolerated PT co-eval with OT well. Pt educated on WBAT: left lower extremity precautions prior to start of treatment with pt verbalizing understanding. Pt able to amb to bedside chair with RW and CGA. Patient currently demonstrates a need for moderate intensity therapy on a daily basis post acute secondary to a decline in functional status due to surgical procedure    Rehab Prognosis: Good; patient would benefit from acute PT services to address these deficits and reach maximum level of function.    Plan:     During this hospitalization, patient to be seen  (Hip pathway 4/1, 4/2, 4/3, then 4x/week after pathway complete.) to address the above listed problems via gait training, therapeutic activities, therapeutic exercises, neuromuscular re-education    Plan of Care Expires: 05/01/24    Subjective     Chief Complaint: L hip pain with mobility  Patient Comments/Goals: "I " "just hurts right here"  Pain/Comfort:  Pain Rating 1: 3/10  Location - Side 1: Left  Location - Orientation 1: generalized  Location 1: hip  Pain Addressed 1: Reposition, Distraction  Pain Rating Post-Intervention 1: 5/10    Patient History:     Living Environment: Pt lives with spouse in [facility] with threshold JAMESON. Bathroom: walkin shower and tub/shower combo   Prior Level of Function: IND  DME owned: shower chair, w/c, RW  Caregiver Assistance: family (son-in law) able to come assist       Objective:     Communicated with RN prior to session. Patient found HOB elevated with perineural catheter, SCD, telemetry upon PT entry to room.    General Precautions: Standard, fall   Orthopedic Precautions: LLE weight bearing as tolerated   Braces: N/A    Respiratory Status: Room air    Exams:  RLE ROM: WFL  RLE Strength:  grossly 4-/5  LLE ROM: WFL  LLE Strength:  grossly 3+/5  Cognitive: Patient is oriented to Person, Place, Time, Situation  Sensation:    -       Intact    Functional Mobility:  Gait belt applied - Yes  Bed Mobility  Rolling Right: contact guard assistance  Supine to Sit: moderate assistance for LE management and trunk management    Transfers  Sit to Stand: minimum assistance with rolling walker and with cues for hand placement  Bed to Chair: contact guard assistance with rolling walker and with cues for hand placement using Step Transfer    Gait  Patient ambulated 5 steps to bedside chair with rolling walker and contact guard assistance. Patient required cues for sequencing to increase independence and safety.    Balance  Sitting: FAIR+: Maintains balance through MINIMAL excursions of active trunk motion  Standing: FAIR: Needs CONTACT GUARD during gait      Therapeutic Activities and Exercises:  Patient educated on role of acute care PT and PT POC, safety while in hospital including calling nurse for mobility, and call light usage.  Educated about weightbearing precautions and provided cuing for adherence as " appropriate during session.  Educated about importance of OOB mobility and remaining up in chair most of the day.  Edcuated about pursed lip breathing technique and cued for use with mobility.  All questions answered within the scope of PT.  White board updated accordingly.      AM-PAC 6 CLICK MOBILITY  Total Score:17    Patient left up in chair with all lines intact, call button in reach, RN notified, and family present .    GOALS:   Multidisciplinary Problems       Physical Therapy Goals          Problem: Physical Therapy    Goal Priority Disciplines Outcome Goal Variances Interventions   Physical Therapy Goal     PT, PT/OT Ongoing, Progressing     Description: Goals to be met by: 2024    Patient will increase functional independence with mobility by performin. Supine to sit with Stand-by Assistance  2. Sit to stand transfer with Stand-by Assistance  3. Bed to chair transfer with Supervision using Rolling Walker  4. Gait  x 100 feet with Stand-by Assistance using Rolling Walker.   5. Lower extremity exercise program x30 reps per handout, with independence                         History:     Past Medical History:   Diagnosis Date    Asthma     Cyst of pancreas     liver    Diabetes mellitus type II     Eczema of hand     Glaucoma     History of colon cancer 10/28/2022    Right sided colon cancer diagnosed in setting of LBO requiring urgent hemicolectomy 10/25/2022 with path showing [id number] disease. CT chest 22 with possible lung metastases and MR liver with indeterminate liver lesions. 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 Patient elected for surveillance.       History of recurrent pneumonia 2020    Hyperlipidemia     Hypertension     Lichen planus     gums    Osteoporosis     Other chronic pancreatitis 2023    Pancreas cyst 2016    Pulmonary nodules     Spinal stenosis of lumbar region  08/30/2018       Past Surgical History:   Procedure Laterality Date    BRONCHOSCOPY N/A 5/13/2022    Procedure: Bronchoscopy;  Surgeon: [facility];  [facility];  Service: Anesthesiology;  Laterality: N/A;    CATARACT EXTRACTION, BILATERAL      CHOLECYSTECTOMY      COLECTOMY, RIGHT Right 10/25/2022    Procedure: COLECTOMY, RIGHT;  Surgeon: Jass Holman MD;  [facility];  Service: Colon and Rectal;  Laterality: Right;    COLONOSCOPY N/A 6/26/2023    Procedure: COLONOSCOPY;  Surgeon: Jass Holman MD;  Location: [facility] ENDO (2ND FLR);  Service: Endoscopy;  Laterality: N/A;  2nd floor -lung disease  referral Dr [id number]-[id number] portal/mailed-GT  6/20/23- Prec[place] confirmed- [place]    ENDOSCOPIC ULTRASOUND OF UPPER GASTROINTESTINAL TRACT N/A 4/22/2022    Procedure: ULTRASOUND, UPPER GI TRACT, ENDOSCOPIC;  Surgeon: Max Rosado MD;  [facility]  Service: Endoscopy;  Laterality: N/A;  urgent EUS (linear) for abnormal CT scan with dilated PD and increased cyst of pancreas cyst.  pap 44 mmHg  4/13:fully vaccinated. instructions via portal-[facility]    EPIDURAL STEROID INJECTION INTO LUMBAR SPINE N/A 11/8/2018    Procedure: Injection-steroid-epidural-lumbar L5-S1;  Surgeon: Nicci Osorio Jr., MD;  Location: [facility] MGT;  Service: Pain Management;  Laterality: N/A;    FUNCTIONAL ENDOSCOPIC SINUS SURGERY (FESS) USING COMPUTER-ASSISTED NAVIGATION N/A 6/17/2019    Procedure: FESS, USING COMPUTER-ASSISTED NAVIGATION;  Surgeon: Rosangela Isabel MD;  Location: [facility] OR;  Service: ENT;  Laterality: N/A;  video    HYSTERECTOMY      INTRAMEDULLARY RODDING OF FEMUR Left 3/31/2024    Procedure: INSERTION, INTRAMEDULLARY CAYDEN, FEMUR - Left,;  Surgeon: Nakul Walker MD;  [address]  Service: Orthopedics;  Laterality: Left;    nasal polyps         Time Tracking:     PT Received On: 04/01/24  PT Start Time: 1032  PT Stop Time: 1056  PT Total  Time (min): 24 min     Billable Minutes: Evaluation 12 and Gait Training 12    4/1/2024

## 2024-04-02 LAB
ANION GAP SERPL CALC-SCNC: 6 MMOL/L (ref 8–16)
BASOPHILS # BLD AUTO: 0.05 K/UL (ref 0–0.2)
BASOPHILS NFR BLD: 0.4 % (ref 0–1.9)
BUN SERPL-MCNC: 27 MG/DL (ref 8–23)
CALCIUM SERPL-MCNC: 8.2 MG/DL (ref 8.7–10.5)
CHLORIDE SERPL-SCNC: 110 MMOL/L (ref 95–110)
CO2 SERPL-SCNC: 22 MMOL/L (ref 23–29)
CREAT SERPL-MCNC: 1.6 MG/DL (ref 0.5–1.4)
DIFFERENTIAL METHOD BLD: ABNORMAL
EOSINOPHIL # BLD AUTO: 0.4 K/UL (ref 0–0.5)
EOSINOPHIL NFR BLD: 3.5 % (ref 0–8)
ERYTHROCYTE [DISTWIDTH] IN BLOOD BY AUTOMATED COUNT: 15.6 % (ref 11.5–14.5)
EST. GFR  (NO RACE VARIABLE): 31 ML/MIN/1.73 M^2
GLUCOSE SERPL-MCNC: 126 MG/DL (ref 70–110)
HCT VFR BLD AUTO: 28 % (ref 37–48.5)
HGB BLD-MCNC: 9.6 G/DL (ref 12–16)
IMM GRANULOCYTES # BLD AUTO: 0.07 K/UL (ref 0–0.04)
IMM GRANULOCYTES NFR BLD AUTO: 0.6 % (ref 0–0.5)
LYMPHOCYTES # BLD AUTO: 1.8 K/UL (ref 1–4.8)
LYMPHOCYTES NFR BLD: 14.5 % (ref 18–48)
MAGNESIUM SERPL-MCNC: 1.6 MG/DL (ref 1.6–2.6)
MCH RBC QN AUTO: 32.5 PG (ref 27–31)
MCHC RBC AUTO-ENTMCNC: 34.3 G/DL (ref 32–36)
MCV RBC AUTO: 95 FL (ref 82–98)
MONOCYTES # BLD AUTO: 0.9 K/UL (ref 0.3–1)
MONOCYTES NFR BLD: 7.4 % (ref 4–15)
NEUTROPHILS # BLD AUTO: 9.1 K/UL (ref 1.8–7.7)
NEUTROPHILS NFR BLD: 73.6 % (ref 38–73)
NRBC BLD-RTO: 0 /100 WBC
PHOSPHATE SERPL-MCNC: 2.4 MG/DL (ref 2.7–4.5)
PLATELET # BLD AUTO: 130 K/UL (ref 150–450)
PMV BLD AUTO: 12.7 FL (ref 9.2–12.9)
POCT GLUCOSE: 123 MG/DL (ref 70–110)
POCT GLUCOSE: 149 MG/DL (ref 70–110)
POCT GLUCOSE: 244 MG/DL (ref 70–110)
POCT GLUCOSE: 248 MG/DL (ref 70–110)
POCT GLUCOSE: 74 MG/DL (ref 70–110)
POTASSIUM SERPL-SCNC: 4.4 MMOL/L (ref 3.5–5.1)
RBC # BLD AUTO: 2.95 M/UL (ref 4–5.4)
SODIUM SERPL-SCNC: 138 MMOL/L (ref 136–145)
WBC # BLD AUTO: 12.29 K/UL (ref 3.9–12.7)

## 2024-04-02 PROCEDURE — 25000003 PHARM REV CODE 250: Performed by: HOSPITALIST

## 2024-04-02 PROCEDURE — 21400001 HC TELEMETRY ROOM

## 2024-04-02 PROCEDURE — 25000003 PHARM REV CODE 250: Performed by: STUDENT IN AN ORGANIZED HEALTH CARE EDUCATION/TRAINING PROGRAM

## 2024-04-02 PROCEDURE — 94640 AIRWAY INHALATION TREATMENT: CPT

## 2024-04-02 PROCEDURE — 63600175 PHARM REV CODE 636 W HCPCS: Performed by: HOSPITALIST

## 2024-04-02 PROCEDURE — 97116 GAIT TRAINING THERAPY: CPT

## 2024-04-02 PROCEDURE — 85025 COMPLETE CBC W/AUTO DIFF WBC: CPT | Performed by: HOSPITALIST

## 2024-04-02 PROCEDURE — 84100 ASSAY OF PHOSPHORUS: CPT | Performed by: HOSPITALIST

## 2024-04-02 PROCEDURE — 97530 THERAPEUTIC ACTIVITIES: CPT

## 2024-04-02 PROCEDURE — 83735 ASSAY OF MAGNESIUM: CPT | Performed by: HOSPITALIST

## 2024-04-02 PROCEDURE — 99231 SBSQ HOSP IP/OBS SF/LOW 25: CPT | Mod: GC,,, | Performed by: ANESTHESIOLOGY

## 2024-04-02 PROCEDURE — 36415 COLL VENOUS BLD VENIPUNCTURE: CPT | Performed by: HOSPITALIST

## 2024-04-02 PROCEDURE — 97535 SELF CARE MNGMENT TRAINING: CPT

## 2024-04-02 PROCEDURE — 80048 BASIC METABOLIC PNL TOTAL CA: CPT | Performed by: HOSPITALIST

## 2024-04-02 RX ORDER — METHOCARBAMOL 500 MG/1
500 TABLET, FILM COATED ORAL EVERY 6 HOURS
Qty: 40 TABLET | Refills: 0
Start: 2024-04-02 | End: 2024-05-08

## 2024-04-02 RX ORDER — ONDANSETRON 8 MG/1
8 TABLET, ORALLY DISINTEGRATING ORAL EVERY 6 HOURS PRN
Start: 2024-04-02

## 2024-04-02 RX ORDER — AMOXICILLIN 250 MG
1 CAPSULE ORAL 2 TIMES DAILY
Start: 2024-04-02

## 2024-04-02 RX ORDER — FLUTICASONE FUROATE AND VILANTEROL 200; 25 UG/1; UG/1
1 POWDER RESPIRATORY (INHALATION) DAILY
Status: ON HOLD
Start: 2024-04-03 | End: 2024-05-02 | Stop reason: HOSPADM

## 2024-04-02 RX ORDER — SODIUM,POTASSIUM PHOSPHATES 280-250MG
1 POWDER IN PACKET (EA) ORAL
Status: DISCONTINUED | OUTPATIENT
Start: 2024-04-02 | End: 2024-04-03 | Stop reason: HOSPADM

## 2024-04-02 RX ORDER — ACETAMINOPHEN 500 MG
1000 TABLET ORAL EVERY 8 HOURS
Refills: 0
Start: 2024-04-02

## 2024-04-02 RX ORDER — INSULIN ASPART 100 [IU]/ML
7 INJECTION, SOLUTION INTRAVENOUS; SUBCUTANEOUS 3 TIMES DAILY
Refills: 0
Start: 2024-04-02 | End: 2024-04-03

## 2024-04-02 RX ORDER — INSULIN ASPART 100 [IU]/ML
7 INJECTION, SOLUTION INTRAVENOUS; SUBCUTANEOUS
Status: DISCONTINUED | OUTPATIENT
Start: 2024-04-02 | End: 2024-04-03

## 2024-04-02 RX ORDER — ACETAMINOPHEN 500 MG
1000 TABLET ORAL EVERY 8 HOURS
Status: DISCONTINUED | OUTPATIENT
Start: 2024-04-02 | End: 2024-04-03 | Stop reason: HOSPADM

## 2024-04-02 RX ORDER — FLUTICASONE FUROATE, UMECLIDINIUM BROMIDE AND VILANTEROL TRIFENATATE 200; 62.5; 25 UG/1; UG/1; UG/1
1 POWDER RESPIRATORY (INHALATION) DAILY
Qty: 3 EACH | Refills: 4
Start: 2024-04-02

## 2024-04-02 RX ORDER — CETIRIZINE HYDROCHLORIDE 5 MG/1
5 TABLET ORAL NIGHTLY
Refills: 0
Start: 2024-04-02 | End: 2025-04-02

## 2024-04-02 RX ORDER — INSULIN ASPART 100 [IU]/ML
4 INJECTION, SOLUTION INTRAVENOUS; SUBCUTANEOUS
Status: DISCONTINUED | OUTPATIENT
Start: 2024-04-02 | End: 2024-04-02

## 2024-04-02 RX ORDER — METHOCARBAMOL 500 MG/1
500 TABLET, FILM COATED ORAL EVERY 6 HOURS
Status: DISCONTINUED | OUTPATIENT
Start: 2024-04-02 | End: 2024-04-03 | Stop reason: HOSPADM

## 2024-04-02 RX ORDER — INSULIN ASPART 100 [IU]/ML
0-10 INJECTION, SOLUTION INTRAVENOUS; SUBCUTANEOUS
Refills: 0
Start: 2024-04-02 | End: 2024-04-03

## 2024-04-02 RX ORDER — CALCIUM CARBONATE 200(500)MG
500 TABLET,CHEWABLE ORAL 3 TIMES DAILY PRN
Start: 2024-04-02 | End: 2025-04-02

## 2024-04-02 RX ORDER — TALC
6 POWDER (GRAM) TOPICAL NIGHTLY PRN
Refills: 0
Start: 2024-04-02 | End: 2024-05-08

## 2024-04-02 RX ORDER — LOSARTAN POTASSIUM 50 MG/1
50 TABLET ORAL DAILY
Status: DISCONTINUED | OUTPATIENT
Start: 2024-04-02 | End: 2024-04-03 | Stop reason: HOSPADM

## 2024-04-02 RX ORDER — SYRING-NEEDL,DISP,INSUL,0.3 ML 29 G X1/2"
296 SYRINGE, EMPTY DISPOSABLE MISCELLANEOUS ONCE
Status: COMPLETED | OUTPATIENT
Start: 2024-04-02 | End: 2024-04-02

## 2024-04-02 RX ORDER — POLYETHYLENE GLYCOL 3350 17 G/17G
17 POWDER, FOR SOLUTION ORAL DAILY
Refills: 0
Start: 2024-04-03 | End: 2024-05-08

## 2024-04-02 RX ORDER — DENOSUMAB 60 MG/ML
60 INJECTION SUBCUTANEOUS
Start: 2024-04-02

## 2024-04-02 RX ADMIN — INSULIN ASPART 4 UNITS: 100 INJECTION, SOLUTION INTRAVENOUS; SUBCUTANEOUS at 12:04

## 2024-04-02 RX ADMIN — Medication 1000 UNITS: at 09:04

## 2024-04-02 RX ADMIN — LATANOPROST 1 DROP: 50 SOLUTION OPHTHALMIC at 09:04

## 2024-04-02 RX ADMIN — TIOTROPIUM BROMIDE INHALATION SPRAY 2 PUFF: 3.12 SPRAY, METERED RESPIRATORY (INHALATION) at 09:04

## 2024-04-02 RX ADMIN — PANCRELIPASE 2 CAPSULE: 24000; 76000; 120000 CAPSULE, DELAYED RELEASE PELLETS ORAL at 12:04

## 2024-04-02 RX ADMIN — METHOCARBAMOL 500 MG: 500 TABLET ORAL at 12:04

## 2024-04-02 RX ADMIN — METHOCARBAMOL 500 MG: 500 TABLET ORAL at 05:04

## 2024-04-02 RX ADMIN — MAGNESIUM CITRATE 296 ML: 1.75 LIQUID ORAL at 12:04

## 2024-04-02 RX ADMIN — POTASSIUM & SODIUM PHOSPHATES POWDER PACK 280-160-250 MG 1 PACKET: 280-160-250 PACK at 12:04

## 2024-04-02 RX ADMIN — PANCRELIPASE 2 CAPSULE: 24000; 76000; 120000 CAPSULE, DELAYED RELEASE PELLETS ORAL at 09:04

## 2024-04-02 RX ADMIN — ATORVASTATIN CALCIUM 20 MG: 20 TABLET, FILM COATED ORAL at 09:04

## 2024-04-02 RX ADMIN — POTASSIUM & SODIUM PHOSPHATES POWDER PACK 280-160-250 MG 1 PACKET: 280-160-250 PACK at 09:04

## 2024-04-02 RX ADMIN — ROPIVACAINE HYDROCHLORIDE 0.1 ML/HR: 2 INJECTION, SOLUTION EPIDURAL; INFILTRATION at 05:04

## 2024-04-02 RX ADMIN — DOCUSATE SODIUM AND SENNOSIDES 1 TABLET: 8.6; 5 TABLET, FILM COATED ORAL at 09:04

## 2024-04-02 RX ADMIN — POLYETHYLENE GLYCOL 3350 17 G: 17 POWDER, FOR SOLUTION ORAL at 09:04

## 2024-04-02 RX ADMIN — Medication 16 G: at 09:04

## 2024-04-02 RX ADMIN — CETIRIZINE HYDROCHLORIDE 5 MG: 5 TABLET, FILM COATED ORAL at 09:04

## 2024-04-02 RX ADMIN — METHOCARBAMOL 500 MG: 500 TABLET ORAL at 11:04

## 2024-04-02 RX ADMIN — LOSARTAN POTASSIUM 50 MG: 50 TABLET, FILM COATED ORAL at 12:04

## 2024-04-02 RX ADMIN — ACETAMINOPHEN 1000 MG: 500 TABLET ORAL at 12:04

## 2024-04-02 RX ADMIN — APIXABAN 2.5 MG: 2.5 TABLET, FILM COATED ORAL at 09:04

## 2024-04-02 RX ADMIN — MUPIROCIN 1 G: 20 OINTMENT TOPICAL at 09:04

## 2024-04-02 RX ADMIN — ACETAMINOPHEN 1000 MG: 500 TABLET ORAL at 05:04

## 2024-04-02 RX ADMIN — ACETAMINOPHEN 1000 MG: 500 TABLET ORAL at 09:04

## 2024-04-02 RX ADMIN — POTASSIUM & SODIUM PHOSPHATES POWDER PACK 280-160-250 MG 1 PACKET: 280-160-250 PACK at 05:04

## 2024-04-02 RX ADMIN — FLUTICASONE FUROATE AND VILANTEROL TRIFENATATE 1 PUFF: 200; 25 POWDER RESPIRATORY (INHALATION) at 09:04

## 2024-04-02 RX ADMIN — INSULIN ASPART 7 UNITS: 100 INJECTION, SOLUTION INTRAVENOUS; SUBCUTANEOUS at 05:04

## 2024-04-02 RX ADMIN — OXYCODONE HYDROCHLORIDE AND ACETAMINOPHEN 500 MG: 500 TABLET ORAL at 09:04

## 2024-04-02 RX ADMIN — INSULIN ASPART 4 UNITS: 100 INJECTION, SOLUTION INTRAVENOUS; SUBCUTANEOUS at 09:04

## 2024-04-02 RX ADMIN — Medication 400 MG: at 09:04

## 2024-04-02 RX ADMIN — PANCRELIPASE 2 CAPSULE: 24000; 76000; 120000 CAPSULE, DELAYED RELEASE PELLETS ORAL at 05:04

## 2024-04-02 RX ADMIN — INSULIN ASPART 4 UNITS: 100 INJECTION, SOLUTION INTRAVENOUS; SUBCUTANEOUS at 05:04

## 2024-04-02 RX ADMIN — MONTELUKAST 10 MG: 10 TABLET, FILM COATED ORAL at 09:04

## 2024-04-02 NOTE — PROGRESS NOTES
[facility] Medicine  Progress Note    Patient Name: Sussy Costa  MRN: [id number]  Patient Class: IP- Inpatient   Admission Date: 3/30/2024  Length of Stay: 3 days  Attending Physician: Glenny Layton MD  Primary Care Provider: PAULA Casillas MD        Subjective:     Principal Problem:Closed 2-part intertrochanteric fracture of left femur        HPI:  87 y/o WF hx with Type 2 DM, CKD stage 3, HTN, pancreatic insufficiency, Asthma, Afib presents to the ED s/p fall with left hip pain.  She was at home and putting items in her fridge when she suffered a fall.  She fell onto her left hip, left arm outstretched, she did not hit her head and had no LOC.  She had immediate severe pain and was unable to stand.  EMS contacted and patient transported to the hospital, plain films show left femoral intertrochanteric fracture.   Her  was home but did not witness the fall.   & Son-in-Law present at bedside    She reports a history of left knee weakness/issues, attends outpatient PT @ [facility] for strengthening.  She has had multiple falls in recent history.  She had 10/10 pain on arrival, received Morphine 4mg IV and reports pain 5/10 during interview, her preceding blood pressures have been 190-200 systolic.   Rechecked at bedside and down to 139s.    At baseline patient performs all ADLs, toileting/bathing/clothing/preps food/manages meds/drives.      She has no active chest pain, dyspnea, no hx of CAD or CHF, no hx of stroke.  Her DASI score is 5.27 mets.  No tobacco/drug use, occasional EToh use.   GLucose on presentation 578 - pt reports she may have overate in afternoon, is adherent to home meds, reconciled at [facility]. Unsure of overall code status, remain Full code pending completion of surgery.     ED Meds: Insulin 5units IV x 1, Zofran IVx1, Morphine 4mg Iv x 1    Overview/Hospital Course:  No notes on file    Interval history- feelign well this morning, had some pain  last night and nursing apparently told her that could not give anythign else when ropivicaine was in and I let her know that pain team is here 24/7 and that's not the case so if she has worsening pain again can let the nurse know to call anesthesia team for pain as they can add stuff as welll when that it Is in and was not a correct statement last night. Has robaxin on as well and ice helped a ltitle, and pain team saw her this morning and aware. Hg stable at 9.6, platelest a tick low at 130 likely from surgery. Cr stable at 1.6 and within baseline ranges. Glucose improved yesterday to 100s with a slight low overnight in 80s so dec levemir/novolog this AM and in lower 100s now and will continue to trend. She reporst the day before admit she was at a BBQ and did have dietary indiscretions withe xtra dessert and BBQ so may have been reason for extreme high on admit as recovering nicely now and on similar/slightly lower to home regimen now given in the hospital may need silghtly less as likely snacking less as well so will trend further today and adjust for night dosing if on lower end with adjustments made today. Plan for [facility]hab as patient's first choice once medically ready, if staying well and on track could be potentially tomorrow vs thusrday so will see how things are going . She's in good spirits, her family was on the phone as well on speaker and discsused with them above and they're on board with plan.        Review of patient's allergies indicates:   Allergen Reactions    Aspirin Other (See Comments)     Asthma    TRIAD OF NASAL POLYPS, ASA  ALLERGY AND ASTHMA.        Aspirin Other (See Comments)    Nsaids (non-steroidal anti-inflammatory drug) Other (See Comments)     AVOID DUE TO TRIAD OF ASA ALLERGY, NASAL POLYPS,AND ASTHMA  AVOID DUE TO TRIAD OF ASA ALLERGY, NASAL POLYPS,AND ASTHMA    Penicillin      Other reaction(s): Rash    9/30/20: tolerates ceftriaxone    Penicillin g Other (See Comments)     Other  reaction(s): Rash    20: tolerates ceftriaxone       No current facility-administered medications on file prior to encounter.     Current Outpatient Medications on File Prior to Encounter   Medication Sig    ACCU-CHEK GUIDE TEST STRIPS Strp TEST FOUR TIMES DAILY WITH MEALS AND NIGHTLY    albuterol (PROVENTIL) 2.5 mg /3 mL (0.083 %) nebulizer solution Take 3 mLs (2.5 mg total) by nebulization every 6 (six) hours as needed for Wheezing or Shortness of Breath. Rescue    ascorbic acid, vitamin C, (VITAMIN C) 500 MG tablet Take 500 mg by mouth once daily.    atorvastatin (LIPITOR) 20 MG tablet Take 1 tablet (20 mg total) by mouth every evening.    budesonide 1 mg/2 mL NbSp SMARTSI Vial(s) Both Nares Twice Daily    chlorthalidone (HYGROTEN) 25 MG Tab Take 1 tablet (25 mg total) by mouth once daily.    denosumab (PROLIA) 60 mg/mL Syrg Inject 60 mg into the skin every 6 (six) months.    fluticasone-umeclidin-vilanter (TRELEGY ELLIPTA) 200-62.5-25 mcg inhaler Inhale 1 puff into the lungs once daily.    irbesartan (AVAPRO) 300 MG tablet Take 1 tablet (300 mg total) by mouth every evening.    lancets (ACCU-CHEK FASTCLIX LANCET DRUM) Misc CHECK BLOOD GLUCOSE FOUR TIMES DAILY    latanoprost 0.005 % ophthalmic solution Inject into the eye. 1 Drops Ophthalmic Every evening    levocetirizine (XYZAL) 5 MG tablet Take 1 tablet (5 mg total) by mouth every evening.    lipase-protease-amylase 24,000-76,000-120,000 units (CREON) 24,000-76,000 -120,000 unit capsule Take 2 capsules with meals orally and 1 capsule with snacks.    magnesium oxide (MAG-OX) 400 mg (241.3 mg magnesium) tablet Take 400 mg by mouth once daily.    montelukast (SINGULAIR) 10 mg tablet Take 1 tablet (10 mg total) by mouth every evening.    NEILMED SINUS RINSE REFILL Pack use as directed    olopatadine (PATANOL) 0.1 % ophthalmic solution Place 1 drop into both eyes 2 (two) times daily as needed for Allergies. 1 Drops Ophthalmic Twice a day    vitamin D  "(VITAMIN D3) 1000 units Tab Take 1,000 Units by mouth once daily.    diclofenac sodium (VOLTAREN) 1 % Gel Apply 2 g topically 4 (four) times daily.    gabapentin (NEURONTIN) 100 MG capsule Take 1 capsule (100 mg total) by mouth 3 (three) times daily. (Patient taking differently: Take 100 mg by mouth 3 (three) times daily as needed.)    insulin aspart U-100 (NOVOLOG FLEXPEN U-100 INSULIN) 100 unit/mL (3 mL) InPn pen Inject 4 Units into the skin before breakfast AND 5 Units daily with lunch AND 4 Units daily with dinner or evening meal. Plus correction scale. Max TDD 30 units..    insulin detemir U-100, Levemir, (LEVEMIR FLEXTOUCH U100 INSULIN) 100 unit/mL (3 mL) InPn pen Inject 6 Units into the skin once daily AND 5 Units every evening. (Patient taking differently: Inject 7 Units into the skin once daily AND 5 Units every evening.)    LIDOcaine HCL 3 % Crea Apply 1 application  topically 2 (two) times daily. Apply to affected area    pen needle, diabetic 31 gauge x 5/16" Ndle Use with insulin pen 5 times a day    [DISCONTINUED] risedronate (ACTONEL) 35 MG tablet Take 1 tablet (35 mg total) by mouth every 7 days.     Family History       Problem Relation (Age of Onset)    Heart disease Father, Brother    Hypertension Brother          Tobacco Use    Smoking status: Never     Passive exposure: Never    Smokeless tobacco: Never   Substance and Sexual Activity    Alcohol use: Yes     Comment: socially    Drug use: No    Sexual activity: Yes     Partners: Male     Birth control/protection: Post-menopausal     Review of Systems   Constitutional:  Positive for activity change. Negative for appetite change and chills.   HENT: Negative.     Respiratory: Negative.     Cardiovascular: Negative.    Gastrointestinal: Negative.    Genitourinary: Negative.    Musculoskeletal: Negative.    Psychiatric/Behavioral: Negative.       Objective:     Vital Signs (Most Recent):  Temp: 97 °F (36.1 °C) (04/02/24 0841)  Pulse: 66 (04/02/24 " "0909)  Resp: 18 (04/02/24 0909)  BP: (!) 187/77 (04/02/24 0841)  SpO2: 96 % (04/02/24 0909) Vital Signs (24h Range):  Temp:  [97 °F (36.1 °C)-98.3 °F (36.8 °C)] 97 °F (36.1 °C)  Pulse:  [66-99] 66  Resp:  [16-18] 18  SpO2:  [96 %-99 %] 96 %  BP: (128-187)/(59-77) 187/77     Weight: 42 kg (92 lb 9.5 oz)  Body mass index is 15.41 kg/m².     Physical Exam  Vitals and nursing note reviewed.   Constitutional:       Appearance: She is not toxic-appearing.   Eyes:      General: No scleral icterus.  Cardiovascular:      Rate and Rhythm: Regular rhythm.      Pulses: Normal pulses.   Pulmonary:      Effort: Pulmonary effort is normal. No respiratory distress.      Breath sounds: No wheezing.   Abdominal:      General: Bowel sounds are normal. There is no distension.      Palpations: Abdomen is soft.      Tenderness: There is no abdominal tenderness. There is no guarding.   Musculoskeletal:         General: Tenderness (left hip, pain w/leg movement) present.      Cervical back: Neck supple.      Right lower leg: No edema.      Left lower leg: No edema.      Comments: Bandages to LLE. Ropivicaine in place   Skin:     General: Skin is warm and dry.      Comments: Abrasions on dorsum of left hand/wrist   Neurological:      General: No focal deficit present.      Mental Status: She is alert and oriented to person, place, and time. Mental status is at baseline.   Psychiatric:         Mood and Affect: Mood normal.         Behavior: Behavior normal.                Significant Labs: All pertinent labs within the past 24 hours have been reviewed.  ABGs:   No results for input(s): "PH", "PCO2", "HCO3", "POCSATURATED", "BE", "TOTALHB", "COHB", "METHB", "O2HB", "POCFIO2", "PO2" in the last 48 hours.    CBC:   Recent Labs   Lab 03/31/24  2112 04/01/24  0412 04/02/24  0527   WBC 14.20* 10.84 12.29   HGB 8.9* 8.2* 9.6*   HCT 27.8* 24.8* 28.0*   * 105* 130*       CMP:   Recent Labs   Lab 04/01/24  0412 04/02/24  0527    138   K " "3.6 4.4   * 110   CO2 19* 22*   GLU 87 126*   BUN 27* 27*   CREATININE 1.6* 1.6*   CALCIUM 7.2* 8.2*   ANIONGAP 5* 6*       Cardiac Markers:   No results for input(s): "CKMB", "MYOGLOBIN", "BNP", "TROPISTAT" in the last 48 hours.    Coagulation:   No results for input(s): "PT", "INR", "APTT" in the last 48 hours.    Lactic Acid: No results for input(s): "LACTATE" in the last 48 hours.  Magnesium:   Recent Labs   Lab 04/01/24  0412 04/02/24  0527   MG 1.7 1.6       Troponin:   No results for input(s): "TROPONINI", "TROPONINIHS" in the last 48 hours.    Urine Studies:   No results for input(s): "COLORU", "APPEARANCEUA", "PHUR", "SPECGRAV", "PROTEINUA", "GLUCUA", "KETONESU", "BILIRUBINUA", "OCCULTUA", "NITRITE", "UROBILINOGEN", "LEUKOCYTESUR", "RBCUA", "WBCUA", "BACTERIA", "SQUAMEPITHEL", "HYALINECASTS" in the last 48 hours.    Invalid input(s): "WRIGHTSUR"      Significant Imaging: I have reviewed all pertinent imaging results/findings within the past 24 hours.    XR HIP WITH PELVIS WHEN PERFORMED, 2 OR 3 VIEWS LEFT     CLINICAL HISTORY:  Left hip pain;     TECHNIQUE:  AP view of the pelvis and frog leg lateral view of the left hip were performed.     COMPARISON:  CT abdomen and pelvis 11/18/2023     FINDINGS:  Four views left hip.     The bilateral sacroiliac joints are intact noting degenerative change.  There is osteopenia.  There are degenerative changes of the right femoroacetabular joint.  The pubic symphysis is intact.  There is acute appearing impacted intertrochanteric fracture of the left femur.  There are surgical changes of the left inguinal region.  There is moderate to large amount of stool in the colon.  There is vascular calcification.     Impression:     1. Intertrochanteric fracture of the left femur as described.        Electronically signed by: Matteo Stallworth MD  Date:                                            03/30/2024  Time:                                           " 20:17    EXAMINATION:  XR CHEST AP PORTABLE     CLINICAL HISTORY:  PRE-OP;     TECHNIQUE:  Single frontal view of the chest was performed.     COMPARISON:  11/18/2023     FINDINGS:  Cardiac monitoring leads overlie the chest.  The cardiac silhouette is stable in size and configuration.  There is prominent atherosclerotic calcification of the thoracic aorta.  Lungs demonstrate chronic coarse interstitial attenuation and scattered subsegmental opacities, similar to prior examination.  No new large confluent airspace consolidation appreciated.  No significant volume of pleural fluid or pneumothorax identified.  Presumed skin fold overlies the right hemithorax.  There is a chronic appearing right lateral 6th rib deformity.  Osseous structures demonstrate degenerative changes.     Impression:     As above.        Electronically signed by: Washington Rizo MD  Date:                                            03/30/2024  Time:                                           22:37                     XR WRIST COMPLETE 3 VIEWS LEFT     CLINICAL HISTORY:  fall;     TECHNIQUE:  PA, lateral, and oblique views of the left wrist were performed.     COMPARISON:  None     FINDINGS:  There is diffuse osteopenia.  No definite radiographic evidence of acute displaced fracture or dislocation of the left wrist.  There are mild degenerative changes present.  Soft tissues are unremarkable.     Impression:     No radiographic evidence of acute displaced fracture or dislocation of the left wrist.        Electronically signed by: Washington Rizo MD  Date:                                            03/30/2024  Time:                                           22:39          Assessment/Plan:      * Closed 2-part intertrochanteric fracture of left femur  -sustained in mechanical fall and IT fx found on admit.  Ortho with OR 3/31 with dr garcia with IM nail with WBAT post op with PT/OT on POD , Pm and R consulted to see if rehab candidate and  following.  -multimodals for pain control with anesthesia managing with ropivicaine placed  -bowel regimen with large stool burden seen on CT on admit, will add mag citrate 4/2  -vit D mildly low at 27 and replacement started  -ca out, purewick in now  -eliquis for 35 days post op for dvt ppx with end date 4/29    Skin tear of hand without complication, left, initial encounter  Seen by wound care and recs placed      Thrombocytopenia  Patient was found to have thrombocytopenia, the likely etiology is platelet consumption from surgery , will monitor the platelets Daily. Will transfuse if platelet count is <50k (if undergoing surgical procedure or have active bleeding). Hold DVT prophylaxis if platelets are <50k. The patient's platelet results have been reviewed and are listed below.  Recent Labs   Lab 04/02/24  0527   *         ADARSH (acute kidney injury)  Patient with acute kidney injury/acute renal failure likely due to pre-renal azotemia due to IVVD ADARSH is currently improving. Baseline creatinine  1.2 to 1.7  - Labs reviewed- Renal function/electrolytes with Estimated Creatinine Clearance: 16.4 mL/min (A) (based on SCr of 1.6 mg/dL (H)). according to latest data. Monitor urine output and serial BMP and adjust therapy as needed. Avoid nephrotoxins and renally dose meds for GFR listed above.    Improved from 2.1 on admit with hyperglycemia likely also contributing to volume depletion and now at 1.7-->1.6    Constipation  Stool burden large seen on CT pelvis on admit and bowel regimen started. If no BM in 24 hours of admit likely will add more aggressive regimen early and mag citrate 4/2      Vitamin D insufficiency  Mildly low at 27 and replacement started      Acute blood loss anemia  Patient's anemia is currently controlled. Has received 1 units of PRBCs on 3/31 PM post op with improvement in BP and Hg to 8.2 . Etiology likely d/t acute blood loss which was from surgical losses and inflammation from  fracture with baseline anemia at 9.9 and stable 4/2 at 9.6  Current CBC reviewed-   Lab Results   Component Value Date    HGB 9.6 (L) 04/02/2024    HCT 28.0 (L) 04/02/2024     Monitor serial CBC and transfuse if patient becomes hemodynamically unstable, symptomatic or H/H drops below 7/21.    Hypomagnesemia  Patient has Abnormal Magnesium: hypomagnesemia. Will continue to monitor electrolytes closely. Will replace the affected electrolytes and repeat labs to be done after interventions completed. The patient's magnesium results have been reviewed and are listed below.  Recent Labs   Lab 03/31/24  0533   MG 1.3*        Stage 3 chronic kidney disease  Creatine stable for now. BMP reviewed- noted Estimated Creatinine Clearance: 16.4 mL/min (A) (based on SCr of 1.6 mg/dL (H)). according to latest data. Based on current GFR, CKD stage is stage 3 - GFR 30-59.  Monitor UOP and serial BMP and adjust therapy as needed. Renally dose meds. Avoid nephrotoxic medications and procedures.  Baseline Cr 1.2 to 1.7 with Fahad with Cr 2. 1 on admit. Given IVF on admit with improving Cr now to 1.6 back within baseline ranges  -on ARB at home, resume equivalent as irbesartan not on formularly now BP better 4/2    Paroxysmal atrial fibrillation  Patient with Paroxysmal (<7 days) atrial fibrillation which is controlled currently with  on no direct rate or rhythm control . Patient is currently in sinus rhythm.[id number] Score: 4. . Anticoagulation not indicated due to not on as outpatient, not indicated to start now .for this reason but is indicated for dvt ppx post op for 35 days for post hip fx    Carcinoid bronchial adenoma, right  Per overview an indolent incidental finding that is being observed with surveillance as an outpatient, on no active treatment.       Pancreatic insufficiency  -cont home dosage of Creon 120, @ 2 tabs with meals and 1 prn with snacks      Type 2 diabetes mellitus with microalbuminuria, without long-term current  use of insulin  She presents with uncontrolled hyperglycemia, no anion gap VBG demonstrates stable PH no ketonemia.  Her serum osmolality is 336, no altered mental status.  And based on calculating osmolality, gap is 14 within normal range.  no HHS at this time no other acute symptoms of hyperglycemic crisis.  -given 6 u of levemir on admit with glucose still 300s on 3/31 AM. Will adjust to 9 U pre op and titrate further post op in PM. Will f/u post op glucose but do at least 6 U of novolog with meals possibly more pending trend. Anesthesia asked to avoid steroids intra-op for intubation and appears did not receive.  -glucose in 80s on 3/31 PM and 4/1 AM, held levemir on 3/31 PM given this, had 10 U sliding scale on 3/31 PM, dec levemir to 5 U BID now and novolog 5 U close to home dose now, and glucose 130 after breakfast so new dosing started this AM and will trend closely given lability may likely need further titration  4/2: slightly low last PM at 80s, in low 100s this AM, dec levemir/novolog for this AM and further trend. Reported also ate at [facility] the day of admit and had extra plates + extra dessert which also may be cause of the extreme highs on admit that have resolved now  -reports at home has some lows to 80s and some highs to 300s but never 500s  -A1c 7.4  -DM diet        Hypophosphatemia  Patient has Abnormal Phosphorus: hypophosphatemia. Will continue to monitor electrolytes closely. Will replace the affected electrolytes and repeat labs to be done after interventions completed. The patient's phosphorus results have been reviewed and are listed below.  Recent Labs   Lab 04/02/24  0527   PHOS 2.4*        Asthma, moderate persistent  Patient had recent asthma exacerbation few weeks ago - uses nebulizers BID - doing well with the PRNS here now  -Use BREO+ SPIRIVA in place of home TRELEGY  -continue home singulair.       Senile osteoporosis  Vit D mildly low at 27 and replacement started  -associated with fx  related osteoperosis  -resume home dosage  -patient on PROLIA every 6 months - last dose in Feb 2024      Primary hypertension  Chronic, controlled. Latest blood pressure and vitals reviewed-   Home meds for hypertension were reviewed and noted below.   Hypertension Medications               chlorthalidone (HYGROTEN) 25 MG Tab Take 1 tablet (25 mg total) by mouth once daily.    irbesartan (AVAPRO) 300 MG tablet Take 1 tablet (300 mg total) by mouth every evening.            While in the hospital, will manage blood pressure as follows; Adjust home antihypertensive regimen as follows- Bp very high to 200s in ER then improved on admit to 140s pre op, dc to 90s in post op period on floor and BP meds were not given (home arb) and improved with 1 U PRB + fluid bolus.  Trending now and in 120s systolic so watch for when higher to resume home arb with biju improved   -4/2 BP elevated to 150-180 now so on irbesartan 300 at home equivalent to losartan 100 now so will start losaratn 50 to make sure not to drop too fast given lows before and if staying elevated after this later today then will add back to full dose equivalent (100) if needed      VTE Risk Mitigation (From admission, onward)           Ordered     apixaban tablet 2.5 mg  2 times daily         03/31/24 1112     IP VTE HIGH RISK PATIENT  Once         03/31/24 0024     Place sequential compression device  Until discontinued         03/31/24 0024                    Discharge Planning   ANTOINE: 4/3/2024     Code Status: Full Code   Is the patient medically ready for discharge?: No    Reason for patient still in hospital (select all that apply): Patient trending condition  Discharge Plan A: Rehab                  Glenny Layton MD  Department of Hospital Medicine   [facility] - Surgery

## 2024-04-02 NOTE — ASSESSMENT & PLAN NOTE
Patient had recent asthma exacerbation few weeks ago - uses nebulizers BID - doing well with the PRNS here now  -Use BREO+ SPIRIVA in place of home TRELEGY  -continue home singulair.

## 2024-04-02 NOTE — ANESTHESIA POST-OP PAIN MANAGEMENT
Acute Pain Service Progress Note    Sussy Costa is a 88 y.o., female, [id number].    Surgery:  INSERTION, INTRAMEDULLARY CAYDEN, FEMUR - Left, (Left: Leg)     Post Op Day #: 2    Catheter type: perineural  SIFI    Infusion type: Ropivacaine 0.2%  15mL q3hr basal    Problem List:    Active Hospital Problems    Diagnosis  POA    *Closed 2-part intertrochanteric fracture of left femur [S72.142A]  Yes    Body mass index (BMI) less than 19 [Z68.1]  Not Applicable    Thrombocytopenia [D69.6]  No    Skin tear of hand without complication, left, initial encounter [S61.412A]  Yes    Hypomagnesemia [E83.42]  Yes    Acute blood loss anemia [D62]  Yes    Vitamin D insufficiency [E55.9]  Yes    Constipation [K59.00]  Yes    ADARSH (acute kidney injury) [N17.9]  Yes    Stage 3 chronic kidney disease [N18.30]  Yes    Paroxysmal atrial fibrillation [I48.0]  Yes    Carcinoid bronchial adenoma, 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 On imaging review appears indolent. Plan for surveillance      Pancreatic insufficiency [K86.89]  Yes     Diagnosed in setting of weight loss and bowel incontinence 2022. Has chronic pancreatitis on imaging. Home medications include Creon      Type 2 diabetes mellitus with microalbuminuria, without long-term current use of insulin [E11.29, R80.9]  Yes     Home regimen includes levemir and Novolog. Last A1c 6.9 in 11/2022      Impaired mobility [Z74.09]  Yes    Hypophosphatemia [E83.39]  Yes    Asthma, moderate persistent [J45.40]  Yes     Well controlled on Trelegy, singulair, and albuterol prn.      Severe protein-calorie malnutrition [E43]  Yes     Bmi of 15.41, hypoalbuminemia       Senile osteoporosis [M81.0]  Yes    Primary hypertension [I10]  Yes     Home medications include chlorthalidone and irbesartan        Resolved Hospital Problems    Diagnosis Date Resolved POA    Preop cardiovascular exam [Z01.810] 03/31/2024 Not  Applicable       Subjective:     General appearance of alert, oriented, no complaints   Pain with rest: 0    Numbers   Pain with movement: 8    Numbers   Side Effects    1. Pruritis No    2. Nausea No    3. Motor Blockade No    4. Sedation No, 1=awake and alert    Objective:     Catheter site clean, dry, intact      Vitals   Vitals:    04/02/24 0841   BP: (!) 187/77   Pulse: 68   Resp: 16   Temp: 36.1 °C (97 °F)        Labs    No results displayed because visit has over 200 results.           Meds   Current Facility-Administered Medications   Medication Dose Route Frequency Provider Last Rate Last Admin    0.9%  NaCl infusion (for blood administration)   Intravenous Q24H PRN Washington Martin MD        albuterol sulfate nebulizer solution 2.5 mg  2.5 mg Nebulization Q4H PRN Deep Blevins MD        apixaban tablet 2.5 mg  2.5 mg Oral BID Washington Martin MD   2.5 mg at 04/01/24 2119    ascorbic acid (vitamin C) tablet 500 mg  500 mg Oral Daily Deep Blevins MD   500 mg at 04/01/24 0842    atorvastatin tablet 20 mg  20 mg Oral QHS Deep Blevins MD   20 mg at 04/01/24 2119    bisacodyL suppository 10 mg  10 mg Rectal Daily PRN Deep Blevins MD        calcium carbonate 200 mg calcium (500 mg) chewable tablet 500 mg  500 mg Oral TID PRN Glenny Layton MD   500 mg at 04/01/24 0915    cetirizine tablet 5 mg  5 mg Oral QHS Deep Blevins MD   5 mg at 04/01/24 2119    dextrose 10% bolus 125 mL 125 mL  12.5 g Intravenous PRN Deep Blevins MD        dextrose 10% bolus 125 mL 125 mL  12.5 g Intravenous PRN Glenny Layton MD        dextrose 10% bolus 250 mL 250 mL  25 g Intravenous PRN Deep Blevins MD        dextrose 10% bolus 250 mL 250 mL  25 g Intravenous PRN Glenny Layton MD        fluticasone furoate-vilanteroL 200-25 mcg/dose diskus inhaler 1 puff  1 puff Inhalation Daily Deep Blevins MD   1 puff at 04/01/24 1424    And    tiotropium bromide 2.5 mcg/actuation  inhaler 2 puff  2 puff Inhalation Daily Deep Blevins MD   2 puff at 04/01/24 1420    glucagon (human recombinant) injection 1 mg  1 mg Intramuscular PRN Glenny Layton MD        glucose chewable tablet 16 g  16 g Oral Glenny Blank MD        glucose chewable tablet 24 g  24 g Oral PRGlenny Gong MD        insulin aspart U-100 pen 0-10 Units  0-10 Units Subcutaneous QID (AC + HS) PRN Glenny Layton MD   4 Units at 04/01/24 1704    insulin aspart U-100 pen 4 Units  4 Units Subcutaneous TIDWM Glenny Layton MD        insulin detemir U-100 (Levemir) pen 6 Units  6 Units Subcutaneous BID Glenny Layton MD        latanoprost 0.005 % ophthalmic solution 1 drop  1 drop Both Eyes QHS Deep Blevins MD   1 drop at 04/01/24 2117    lipase-protease-amylase 24,000-76,000-120,000 units capsule 1 capsule  1 capsule Oral TID PRDeep Reid MD        lipase-protease-amylase 24,000-76,000-120,000 units capsule 2 capsule  2 capsule Oral TID  Deep Blevins MD   2 capsule at 04/01/24 1704    magnesium oxide tablet 400 mg  400 mg Oral Daily Deep Blevins MD   400 mg at 04/01/24 0842    melatonin tablet 6 mg  6 mg Oral Nightly PRN Deep Blevins MD   6 mg at 04/01/24 2119    methocarbamoL tablet 500 mg  500 mg Oral Q6H PRN Deep Blevins MD   500 mg at 04/02/24 0512    montelukast tablet 10 mg  10 mg Oral QHS Deep Blevins MD   10 mg at 04/01/24 2119    mupirocin 2 % ointment 1 g  1 g Nasal BID Deep Blevins MD   1 g at 04/01/24 2117    olopatadine 0.1 % ophthalmic solution 1 drop  1 drop Both Eyes BID PRDeep Reid MD        ondansetron disintegrating tablet 8 mg  8 mg Oral Q6H PRN Glenny Layton MD   8 mg at 03/31/24 1450    ondansetron injection 4 mg  4 mg Intravenous Q12H PRN Deep Blevins MD   4 mg at 03/31/24 2202    polyethylene glycol packet 17 g  17 g Oral Daily Deep Blevins MD   17 g at 04/01/24 5880     potassium, sodium phosphates 280-160-250 mg packet 1 packet  1 packet Oral QID (AC & HS) Glenny Layton MD        ROPIvacaine (PF) 2 mg/ml (0.2%) solution  0.1 mL/hr Perineural Continuous Deep Blevins MD 0.1 mL/hr at 04/02/24 0511 0.1 mL/hr at 04/02/24 0511    senna-docusate 8.6-50 mg per tablet 1 tablet  1 tablet Oral BID Deep Blevins MD   1 tablet at 04/01/24 2119    sodium chloride 0.9% flush 10 mL  10 mL Intravenous PRN Deep Blevins MD        tuberculin injection 5 Units  5 Units Intradermal Once Deep Blevins MD        vitamin D 1000 units tablet 1,000 Units  1,000 Units Oral Daily Deep Blevins MD   1,000 Units at 04/01/24 0842        Anticoagulant: eliquis 2.5mg BID    Assessment:     Pain control adequate. Patient states she began having some pain this morning. We bolused her catheter 15mL off the pump. Changed robaxin from PRN to scheduled.    Plan:     Patient doing well, continue present treatment.  Continue PNC at current rate. Plan to pause/pull on 4/3.  Continue multimodal pain control - tylenol 1000mg q6hr and robaxin 500mg q6hr.  Discontinued gabapentin in setting of ADARSH on CKD3 - patient states she was prescribed this after a back injury and has not taken it since October.  Avoid narcotics.    We will continue to follow along. Please call with any questions or concerns.    Discussed with staff, Dr. Miranda. See full attestation for final recommendations.    Susan Quiles,   PGY-3 Anesthesiology

## 2024-04-02 NOTE — PT/OT/SLP PROGRESS
Occupational Therapy   Treatment    Name: Sussy Costa  MRN: [id number]  Admitting Diagnosis:  Closed 2-part intertrochanteric fracture of left femur  2 Days Post-Op    Recommendations:     Discharge Recommendations: Moderate Intensity Therapy  Discharge Equipment Recommendations:  none  Barriers to discharge:  Other (Comment) (increased assistance required for ADLs & functional mobility/transfers)    Assessment:     Sussy Costa is a 88 y.o. female with a medical diagnosis of Closed 2-part intertrochanteric fracture of left femur.  She presents with the following performance deficits affecting function are weakness, impaired endurance, impaired self care skills, impaired functional mobility, impaired balance, gait instability, pain, decreased lower extremity function, orthopedic precautions. Pt agreeable to therapy and tolerated fairly well. Pt is demonstrating improvements with bed mobility, ADLs, & functional transfers. However, pt remains limited in ADLs, functional mobility, and functional transfers and is currently not performing tasks at [facility]. Pt would continue to benefit from skilled OT services to maximize functional independence with ADLs and functional mobility, reduce caregiver burden, and facilitate safe discharge in the least restrictive environment.     Rehab Prognosis:  Good; patient would benefit from acute skilled OT services to address these deficits and reach maximum level of function.       Plan:     Patient to be seen daily to address the above listed problems via self-care/home management, therapeutic activities, therapeutic exercises, neuromuscular re-education  Plan of Care Expires: 04/30/24  Plan of Care Reviewed with: patient    Subjective     Chief Complaint: none reported  Patient/Family Comments/goals: pt agreeable to participate with OT  Pain/Comfort:  Pain Rating 1: 0/10  Location - Side 1: Left  Location - Orientation 1: generalized  Location 1: hip  Pain Addressed 1:  Distraction  Pain Rating Post-Intervention 1: 7/10    Objective:     Communicated with: RN prior to session.  Patient found HOB elevated with telemetry, SCD, perineural catheter upon OT entry to room.    General Precautions: Standard, fall    Orthopedic Precautions:LLE weight bearing as tolerated  Braces: N/A  Respiratory Status: Room air     Occupational Performance:     Bed Mobility:    Patient completed Scooting to EOB with contact guard assistance  Patient completed Supine to Sit with minimum assistance  Pt requires increased time to complete transfer 2/2 L LE pain    Functional Mobility/Transfers:  Patient completed 2 Sit <> Stand Transfers (from EOB) with contact guard assistance  with  rolling walker   Patient completed Bed <> Chair Transfer using Step Transfer technique with minimum assistance with rolling walker  Pt requires increased time to complete transfer 2/2 L LE pain & weakness    Activities of Daily Living:  Bathing: contact guard assistance to complete fang hygiene while in standing with rolling walker  Pt required CGA to maintain balance & safety  Upper Body Dressing: minimum assistance to dof/don gown  Pt completed task while seated EOB  [facility]      [facility] 6 Click ADL: 17    Treatment & Education:  -Pt educated on hand placement for transfers  -Pt educated on RW placement during functional transfers/mobility  -Pt educated on weightbearing and surgical precautions with pt verbalizing understanding  -Education on task modification to maximize safety and (I) during ADLs and mobility  -Education on importance of OOB activity to improve overall activity tolerance and promote recovery  -Pt educated to call for assistance and to transfer with hospital staff only  -Provided education regarding role of OT & POC with pt verbalizing understanding. Pt had no further questions & when asked whether there were any concerns pt reported none.     Patient left up in chair with all lines  intact, call button in reach, and RN notified    GOALS:   Multidisciplinary Problems       Occupational Therapy Goals          Problem: Occupational Therapy    Goal Priority Disciplines Outcome Interventions   Occupational Therapy Goal     OT, PT/OT Ongoing, Progressing    Description: Goals to be met by: 04/30/2024     Patient will increase functional independence with ADLs by performing:    UE Dressing with Set-up Assistance.  LE Dressing with Modified Webb.  Grooming while bedside chair with Set-up Assistance.  Toileting from bedside commode with Stand-by Assistance for hygiene and clothing management.   Supine to sit with Modified Webb.  Step transfer with Contact Guard Assistance                         Time Tracking:     OT Date of Treatment: 04/02/24  OT Start Time: 0957  OT Stop Time: 1021  OT Total Time (min): 24 min    Billable Minutes:Self Care/Home Management 15  Therapeutic Activity 9    OT/FINA: OT          4/2/2024

## 2024-04-02 NOTE — ASSESSMENT & PLAN NOTE
Chronic, controlled. Latest blood pressure and vitals reviewed-   Home meds for hypertension were reviewed and noted below.   Hypertension Medications               chlorthalidone (HYGROTEN) 25 MG Tab Take 1 tablet (25 mg total) by mouth once daily.    irbesartan (AVAPRO) 300 MG tablet Take 1 tablet (300 mg total) by mouth every evening.            While in the hospital, will manage blood pressure as follows; Adjust home antihypertensive regimen as follows- Bp very high to 200s in ER then improved on admit to 140s pre op, dc to 90s in post op period on floor and BP meds were not given (home arb) and improved with 1 U PRB + fluid bolus.  Trending now and in 120s systolic so watch for when higher to resume home arb with biju improved   -4/2 BP elevated to 150-180 now so on irbesartan 300 at home equivalent to losartan 100 now so will start losaratn 50 to make sure not to drop too fast given lows before and if staying elevated after this later today then will add back to full dose equivalent (100) if needed

## 2024-04-02 NOTE — ASSESSMENT & PLAN NOTE
Patient's anemia is currently controlled. Has received 1 units of PRBCs on 3/31 PM post op with improvement in BP and Hg to 8.2 . Etiology likely d/t acute blood loss which was from surgical losses and inflammation from fracture with baseline anemia at 9.9 and stable 4/2 at 9.6  Current CBC reviewed-   Lab Results   Component Value Date    HGB 9.6 (L) 04/02/2024    HCT 28.0 (L) 04/02/2024     Monitor serial CBC and transfuse if patient becomes hemodynamically unstable, symptomatic or H/H drops below 7/21.

## 2024-04-02 NOTE — PLAN OF CARE
Problem: Adult Inpatient Plan of Care  Goal: Plan of Care Review  Outcome: Ongoing, Progressing  Goal: Patient-Specific Goal (Individualized)  Outcome: Ongoing, Progressing  Goal: Absence of Hospital-Acquired Illness or Injury  Outcome: Ongoing, Progressing  Goal: Optimal Comfort and Wellbeing  Outcome: Ongoing, Progressing  Goal: Readiness for Transition of Care  Outcome: Ongoing, Progressing     Problem: Infection  Goal: Absence of Infection Signs and Symptoms  Outcome: Ongoing, Progressing   Patient AAOx4, VSS, NADN.  Patient c/o of increased pain through the night MD on call notified, and stated she would contact anesthesia to take a look at patient. Ice applied to area for comfort, tolerated well. Bed in lowest position, call light in reach, along with personal items. Plan of care on going.

## 2024-04-02 NOTE — PROGRESS NOTES
[facility] - [facility]  Orthopedics  Progress Note    Patient Name: Sussy Costa  MRN: [id number]  Admission Date: 3/30/2024  Hospital Length of Stay: 3 days  Attending Provider: Glenny Layton MD  Primary Care Provider: PAULA Casillas MD  Follow-up For: Procedure(s) (LRB):  INSERTION, INTRAMEDULLARY CAYDEN, FEMUR - Left, (Left)    Post-Operative Day: 2 Days Post-Op  Subjective:     Principal Problem:Closed 2-part intertrochanteric fracture of left femur    Principal Orthopedic Problem: same, s/p CMN 3/31    Interval History: No acute events overnight.  Vitals within normal limits.  Took 5 steps with PT yesterday.  Patient reporting pain issues in the thigh/groin overnight, but comfortable this morning with ice application.    Review of patient's allergies indicates:   Allergen Reactions    Aspirin Other (See Comments)     Asthma    TRIAD OF NASAL POLYPS, ASA  ALLERGY AND ASTHMA.        Aspirin Other (See Comments)    Nsaids (non-steroidal anti-inflammatory drug) Other (See Comments)     AVOID DUE TO TRIAD OF ASA ALLERGY, NASAL POLYPS,AND ASTHMA  AVOID DUE TO TRIAD OF ASA ALLERGY, NASAL POLYPS,AND ASTHMA    Penicillin      Other reaction(s): Rash    9/30/20: tolerates ceftriaxone    Penicillin g Other (See Comments)     Other reaction(s): Rash    9/30/20: tolerates ceftriaxone       Current Facility-Administered Medications   Medication    0.9%  NaCl infusion (for blood administration)    albuterol sulfate nebulizer solution 2.5 mg    apixaban tablet 2.5 mg    ascorbic acid (vitamin C) tablet 500 mg    atorvastatin tablet 20 mg    bisacodyL suppository 10 mg    calcium carbonate 200 mg calcium (500 mg) chewable tablet 500 mg    cetirizine tablet 5 mg    dextrose 10% bolus 125 mL 125 mL    dextrose 10% bolus 125 mL 125 mL    dextrose 10% bolus 250 mL 250 mL    dextrose 10% bolus 250 mL 250 mL    fluticasone furoate-vilanteroL 200-25 mcg/dose diskus inhaler 1 puff    And    tiotropium bromide 2.5 mcg/actuation  "inhaler 2 puff    glucagon (human recombinant) injection 1 mg    glucose chewable tablet 16 g    glucose chewable tablet 24 g    insulin aspart U-100 pen 0-10 Units    insulin aspart U-100 pen 4 Units    insulin detemir U-100 (Levemir) pen 6 Units    latanoprost 0.005 % ophthalmic solution 1 drop    lipase-protease-amylase 24,000-76,000-120,000 units capsule 1 capsule    lipase-protease-amylase 24,000-76,000-120,000 units capsule 2 capsule    magnesium oxide tablet 400 mg    melatonin tablet 6 mg    methocarbamoL tablet 500 mg    montelukast tablet 10 mg    mupirocin 2 % ointment 1 g    olopatadine 0.1 % ophthalmic solution 1 drop    ondansetron disintegrating tablet 8 mg    ondansetron injection 4 mg    polyethylene glycol packet 17 g    potassium, sodium phosphates 280-160-250 mg packet 1 packet    ROPIvacaine (PF) 2 mg/ml (0.2%) solution    senna-docusate 8.6-50 mg per tablet 1 tablet    sodium chloride 0.9% flush 10 mL    tuberculin injection 5 Units    vitamin D 1000 units tablet 1,000 Units     Objective:     Vital Signs (Most Recent):  Temp: 98.3 °F (36.8 °C) (04/02/24 0349)  Pulse: 83 (04/02/24 0349)  Resp: 16 (04/02/24 0349)  BP: (!) 151/59 (04/02/24 0349)  SpO2: 96 % (04/02/24 0349) Vital Signs (24h Range):  Temp:  [97.9 °F (36.6 °C)-98.3 °F (36.8 °C)] 98.3 °F (36.8 °C)  Pulse:  [66-99] 83  Resp:  [16-18] 16  SpO2:  [96 %-99 %] 96 %  BP: (128-151)/(59-70) 151/59     Weight: 42 kg (92 lb 9.5 oz)  Height: 5' 5" (165.1 cm)  Body mass index is 15.41 kg/m².      Intake/Output Summary (Last 24 hours) at 4/2/2024 0811  Last data filed at 4/2/2024 0500  Gross per 24 hour   Intake 350 ml   Output 1150 ml   Net -800 ml         Ortho/SPM Exam  Gen: NAD, sitting comfortably in bed  CV: regular rate  Resp: non-labored respirations    LLE:  Dressings clean, dry, and intact  No significant hematoma over operative site  Appropriate postoperative TTP  SILT Sa/Stephens/DP/SP/T  Motor intact EHL/FHL/TA/Gastroc/Quad  2+ DP, 2+ " PT       Significant Labs: All pertinent labs within the past 24 hours have been reviewed.    Significant Imaging: I have reviewed and interpreted all pertinent imaging results/findings.  Assessment/Plan:     * Closed 2-part intertrochanteric fracture of left femur  Sussy Costa is a 88 y.o. female with a left intertrochanteric femur fracture. She is closed and neurovascularly intact. Now s/p left femur CMN on 3/31/24 with Dr. Walker. Doing well this morning.    Plan:  Pain control: multimodal, PNC per Anesthesia  PT/OT: WBAT LLE  DVT PPx: Eliquis 2.5 mg BID, SCDs at all times when not ambulating  Labs: hemoglobin 9.6 from 8.2  Guevara: removed 4/1/24    Dispo: PT/OT recommending moderate intensity therapy. Okay for discharge from Orthopedic perspective            Vinh Jones MD  Orthopedics  [facility] - Surgery

## 2024-04-02 NOTE — SUBJECTIVE & OBJECTIVE
Principal Problem:Closed 2-part intertrochanteric fracture of left femur    Principal Orthopedic Problem: same, s/p CMN 3/31    Interval History: No acute events overnight.  Vitals within normal limits.  Took 5 steps with PT yesterday.  Patient reporting pain issues in the thigh/groin overnight, but comfortable this morning with ice application.    Review of patient's allergies indicates:   Allergen Reactions    Aspirin Other (See Comments)     Asthma    TRIAD OF NASAL POLYPS, ASA  ALLERGY AND ASTHMA.        Aspirin Other (See Comments)    Nsaids (non-steroidal anti-inflammatory drug) Other (See Comments)     AVOID DUE TO TRIAD OF ASA ALLERGY, NASAL POLYPS,AND ASTHMA  AVOID DUE TO TRIAD OF ASA ALLERGY, NASAL POLYPS,AND ASTHMA    Penicillin      Other reaction(s): Rash    9/30/20: tolerates ceftriaxone    Penicillin g Other (See Comments)     Other reaction(s): Rash    9/30/20: tolerates ceftriaxone       Current Facility-Administered Medications   Medication    0.9%  NaCl infusion (for blood administration)    albuterol sulfate nebulizer solution 2.5 mg    apixaban tablet 2.5 mg    ascorbic acid (vitamin C) tablet 500 mg    atorvastatin tablet 20 mg    bisacodyL suppository 10 mg    calcium carbonate 200 mg calcium (500 mg) chewable tablet 500 mg    cetirizine tablet 5 mg    dextrose 10% bolus 125 mL 125 mL    dextrose 10% bolus 125 mL 125 mL    dextrose 10% bolus 250 mL 250 mL    dextrose 10% bolus 250 mL 250 mL    fluticasone furoate-vilanteroL 200-25 mcg/dose diskus inhaler 1 puff    And    tiotropium bromide 2.5 mcg/actuation inhaler 2 puff    glucagon (human recombinant) injection 1 mg    glucose chewable tablet 16 g    glucose chewable tablet 24 g    insulin aspart U-100 pen 0-10 Units    insulin aspart U-100 pen 4 Units    insulin detemir U-100 (Levemir) pen 6 Units    latanoprost 0.005 % ophthalmic solution 1 drop    lipase-protease-amylase 24,000-76,000-120,000 units capsule 1 capsule     "lipase-protease-amylase 24,000-76,000-120,000 units capsule 2 capsule    magnesium oxide tablet 400 mg    melatonin tablet 6 mg    methocarbamoL tablet 500 mg    montelukast tablet 10 mg    mupirocin 2 % ointment 1 g    olopatadine 0.1 % ophthalmic solution 1 drop    ondansetron disintegrating tablet 8 mg    ondansetron injection 4 mg    polyethylene glycol packet 17 g    potassium, sodium phosphates 280-160-250 mg packet 1 packet    ROPIvacaine (PF) 2 mg/ml (0.2%) solution    senna-docusate 8.6-50 mg per tablet 1 tablet    sodium chloride 0.9% flush 10 mL    tuberculin injection 5 Units    vitamin D 1000 units tablet 1,000 Units     Objective:     Vital Signs (Most Recent):  Temp: 98.3 °F (36.8 °C) (04/02/24 0349)  Pulse: 83 (04/02/24 0349)  Resp: 16 (04/02/24 0349)  BP: (!) 151/59 (04/02/24 0349)  SpO2: 96 % (04/02/24 0349) Vital Signs (24h Range):  Temp:  [97.9 °F (36.6 °C)-98.3 °F (36.8 °C)] 98.3 °F (36.8 °C)  Pulse:  [66-99] 83  Resp:  [16-18] 16  SpO2:  [96 %-99 %] 96 %  BP: (128-151)/(59-70) 151/59     Weight: 42 kg (92 lb 9.5 oz)  Height: 5' 5" (165.1 cm)  Body mass index is 15.41 kg/m².      Intake/Output Summary (Last 24 hours) at 4/2/2024 0811  Last data filed at 4/2/2024 0500  Gross per 24 hour   Intake 350 ml   Output 1150 ml   Net -800 ml          Ortho/SPM Exam  Gen: NAD, sitting comfortably in bed  CV: regular rate  Resp: non-labored respirations    LLE:  Dressings clean, dry, and intact  No significant hematoma over operative site  Appropriate postoperative TTP  SILT Sa/Stephens/DP/SP/T  Motor intact EHL/FHL/TA/Gastroc/Quad  2+ DP, 2+ PT       Significant Labs: All pertinent labs within the past 24 hours have been reviewed.    Significant Imaging: I have reviewed and interpreted all pertinent imaging results/findings.  "

## 2024-04-02 NOTE — ASSESSMENT & PLAN NOTE
-sustained in mechanical fall and IT fx found on admit.  Ortho with OR 3/31 with dr garcia with IM nail with WBAT post op with PT/OT on POD , Pm and R consulted to see if rehab candidate and following.  -multimodals for pain control with anesthesia managing with ropivicaine placed  -bowel regimen with large stool burden seen on CT on admit, will add mag citrate 4/2  -vit D mildly low at 27 and replacement started  -ca out, tahirck in now  -eliquis for 35 days post op for dvt ppx with end date 4/29

## 2024-04-02 NOTE — ASSESSMENT & PLAN NOTE
Creatine stable for now. BMP reviewed- noted Estimated Creatinine Clearance: 16.4 mL/min (A) (based on SCr of 1.6 mg/dL (H)). according to latest data. Based on current GFR, CKD stage is stage 3 - GFR 30-59.  Monitor UOP and serial BMP and adjust therapy as needed. Renally dose meds. Avoid nephrotoxic medications and procedures.  Baseline Cr 1.2 to 1.7 with Fahad with Cr 2. 1 on admit. Given IVF on admit with improving Cr now to 1.6 back within baseline ranges  -on ARB at home, resume equivalent as irbesartan not on formularly now BP better 4/2

## 2024-04-02 NOTE — NURSING
Nurses Note -- 4 Eyes      4/2/2024   4:37 AM      Skin assessed during: Q Shift Change      [x] No Altered Skin Integrity Present    []Prevention Measures Documented      [] Yes- Altered Skin Integrity Present or Discovered   [] LDA Added if Not in Epic (Describe Wound)   [] New Altered Skin Integrity was Present on Admit and Documented in LDA   [] Wound Image Taken    Wound Care Consulted? No    Attending Nurse:  Corona Arrieta RN/Staff Member:  NATALIE

## 2024-04-02 NOTE — ASSESSMENT & PLAN NOTE
Patient was found to have thrombocytopenia, the likely etiology is platelet consumption from surgery , will monitor the platelets Daily. Will transfuse if platelet count is <50k (if undergoing surgical procedure or have active bleeding). Hold DVT prophylaxis if platelets are <50k. The patient's platelet results have been reviewed and are listed below.  Recent Labs   Lab 04/02/24  0527   *

## 2024-04-02 NOTE — PLAN OF CARE
[facility]    FACILITY TRANSFER ORDERS      Patient Name: Sussy Costa  YOB: 1935    PCP: PAULA Casillas MD   PCP Address: [address] [facility] [address]  [place]  PCP Phone Number: [phone]  PCP Fax: [phone]    Encounter Date: 04/03/2024    Admit to: inpatient rehab    Vital Signs:  Routine    Diagnoses:   Active Hospital Problems    Diagnosis  POA    *Closed 2-part intertrochanteric fracture of left femur [S72.142A]  Yes    Body mass index (BMI) less than 19 [Z68.1]  Not Applicable    Thrombocytopenia [D69.6]  No    Skin tear of hand without complication, left, initial encounter [S61.412A]  Yes    Hypomagnesemia [E83.42]  Yes    Acute blood loss anemia [D62]  Yes    Vitamin D insufficiency [E55.9]  Yes    Constipation [K59.00]  Yes    ADARSH (acute kidney injury) [N17.9]  Yes    Stage 3 chronic kidney disease [N18.30]  Yes    Paroxysmal atrial fibrillation [I48.0]  Yes    Carcinoid bronchial adenoma, 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 On imaging review appears indolent. Plan for surveillance      Pancreatic insufficiency [K86.89]  Yes     Diagnosed in setting of weight loss and bowel incontinence 2022. Has chronic pancreatitis on imaging. Home medications include Creon      Type 2 diabetes mellitus with microalbuminuria, without long-term current use of insulin [E11.29, R80.9]  Yes     Home regimen includes levemir and Novolog. Last A1c 6.9 in 11/2022      Impaired mobility [Z74.09]  Yes    Hypophosphatemia [E83.39]  Yes    Asthma, moderate persistent [J45.40]  Yes     Well controlled on Trelegy, singulair, and albuterol prn.      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   Yes  Keep patient NPO? No   Current Respiratory Status: room air      Pt working with OT upon SLP entry. PT with decreased divided attention requiring occasional redirection back to task at hand. Pt oriented x4 ind'ly. Reasoning task completed with 100% accuracy provided increased time to answer and mild repetition. Pt completed delayed recall task with 2/3 accuracy ind'ly increasing to 3/3 provided one cue. Pt progessing with current goals at this time, and states she believes she is close to baseline cognitively. Pt tolerated thin liquids x5 via straw and cracker x2 with timely oral clearance and no overt s/s of aspiration. Recommend regular diet/thin liquids at this time.  Continue POC at this time.     Assessment:     Kristina Aguirre is a 70 y.o. female with an SLP diagnosis of Dysphagia and Cognitive-Linguistic Impairment.      Goals:    SLP Goals        Problem: SLP Goal    Goal Priority Disciplines Outcome   SLP Goal     SLP Ongoing (interventions implemented as appropriate)   Description:  Updated Speech Language Pathology Goals  Goals expected to be met by 4/3/18:    1.  Pt will tolerate Dental Soft diet w/ thin liquids w/ no overt signs of aspiration.  2.  Pt will be oriented x4 using external cues.  3.  Pt will  Diagnosis Date Resolved POA    Preop cardiovascular exam [Z01.810] 03/31/2024 Not Applicable       Allergies:  Review of patient's allergies indicates:   Allergen Reactions    Aspirin Other (See Comments)     Asthma    TRIAD OF NASAL POLYPS, ASA  ALLERGY AND ASTHMA.        Aspirin Other (See Comments)    Nsaids (non-steroidal anti-inflammatory drug) Other (See Comments)     AVOID DUE TO TRIAD OF ASA ALLERGY, NASAL POLYPS,AND ASTHMA  AVOID DUE TO TRIAD OF ASA ALLERGY, NASAL POLYPS,AND ASTHMA    Penicillin      Other reaction(s): Rash    9/30/20: tolerates ceftriaxone    Penicillin g Other (See Comments)     Other reaction(s): Rash    9/30/20: tolerates ceftriaxone       Diet: diabetic diet: 2000 calorie    Activities: wbat to LLE    Goals of Care Treatment Preferences:  Code Status: Full Code        CONSULTS:    Physical Therapy to evaluate and treat.  and Occupational Therapy to evaluate and treat.    MISCELLANEOUS CARE:  Routine Skin for Bedridden Patients: Apply moisture barrier cream to all skin folds and wet areas in perineal area daily and after baths and all bowel movements. and Diabetes Care:   SN to perform and educate Diabetic management with blood glucose monitoring:, Fingerstick blood sugar AC and HS, and Report CBG < 60 or > 350 to physician.    WOUND CARE ORDERS  Keep bandages in place to LLE until orthopedics follow up. Do not get wet or immerse in water. Reinforce PRN if fraying.    LUE  (lower arm, dorsal hand) skin tear- clean wound with Vashe solution. Cover wound with border foam dressing. Perform care 2x a week (mon /thurs) until healed.    Medications: Review discharge medications with patient and family and provide education.      Current Discharge Medication List        START taking these medications    Details   acetaminophen (TYLENOL) 500 MG tablet Take 2 tablets (1,000 mg total) by mouth every 8 (eight) hours.  Refills: 0      apixaban (ELIQUIS) 2.5 mg Tab Take 1 tablet (2.5 mg total) by  complete abstract naming tasks w/ 80% acc given min cues.  4.  Pt will complete problem-solving tasks w/ 80% acc given min cues.  5.  Pt will complete reasoning tasks w/ 80% acc given min cues.  6.  Pt will complete short term memory tasks w/ 66% acc indep using memory strategies.  7.  Pt will complete further evaluation of reading, writing and visual spatial skills to determine the need for additional goals.        Speech Language Pathology Goals  Goals expected to be met by 4/1  1. Pt will participate in ongoing swallow assessment -Goal Met  2. Pt will participate in speech, language and cognitive assessment -Goal Met                       Plan:     · Patient to be seen:  5 x/week   · Plan of Care expires:  04/24/18  · Plan of Care reviewed with:  patient   · SLP Follow-Up:  Yes       Discharge recommendations:  rehabilitation facility       Time Tracking:     SLP Treatment Date:   03/29/18  Speech Start Time:  0939  Speech Stop Time:  0955     Speech Total Time (min):  16 min    Billable Minutes: Speech Therapy Individual 8 and Treatment Swallowing Dysfunction 8    Priya English CCC-SLP  03/29/2018   mouth 2 (two) times daily. End date 4/29/24      calcium carbonate (TUMS) 200 mg calcium (500 mg) chewable tablet Take 1 tablet (500 mg total) by mouth 3 (three) times daily as needed for Heartburn.      cetirizine (ZYRTEC) 5 MG tablet Take 1 tablet (5 mg total) by mouth every evening.  Refills: 0      fluticasone furoate-vilanteroL (BREO) 200-25 mcg/dose DsDv diskus inhaler Inhale 1 puff into the lungs once daily. If home trillegy inhaler is not on formulary use this instead at rehab. If trillegy is on formulary at rehab then stop this at rehab      melatonin (MELATIN) 3 mg tablet Take 2 tablets (6 mg total) by mouth nightly as needed for Insomnia.  Refills: 0      methocarbamoL (ROBAXIN) 500 MG Tab Take 1 tablet (500 mg total) by mouth every 6 (six) hours.  Qty: 40 tablet, Refills: 0      ondansetron (ZOFRAN-ODT) 8 MG TbDL Take 1 tablet (8 mg total) by mouth every 6 (six) hours as needed.      polyethylene glycol (GLYCOLAX) 17 gram PwPk Take 17 g by mouth once daily.  Refills: 0      senna-docusate 8.6-50 mg (PERICOLACE) 8.6-50 mg per tablet Take 1 tablet by mouth 2 (two) times daily.           CONTINUE these medications which have CHANGED    Details   denosumab (PROLIA) 60 mg/mL Syrg Inject 1 mL (60 mg total) into the skin every 6 (six) months. Hold at rehab    Associated Diagnoses: Closed intertrochanteric fracture of hip, left, initial encounter      fluticasone-umeclidin-vilanter (TRELEGY ELLIPTA) 200-62.5-25 mcg inhaler Inhale 1 puff into the lungs once daily. Hold at rehah if not on formulary  Qty: 3 each, Refills: 4    Associated Diagnoses: Moderate persistent asthma without complication      !! insulin aspart U-100 (NOVOLOG FLEXPEN U-100 INSULIN) 100 unit/mL (3 mL) InPn pen Inject 5 Units into the skin 3 (three) times daily.  Refills: 0      !! insulin aspart U-100 (NOVOLOG) 100 unit/mL (3 mL) InPn pen Inject 0-5 Units into the skin before meals and at bedtime as needed (Hyperglycemia).  Refills: 0       insulin detemir U-100, Levemir, 100 unit/mL (3 mL) SubQ InPn pen Inject 4 Units into the skin 2 (two) times daily.  Refills: 0       !! - Potential duplicate medications found. Please discuss with provider.        CONTINUE these medications which have NOT CHANGED    Details   albuterol (PROVENTIL) 2.5 mg /3 mL (0.083 %) nebulizer solution Take 3 mLs (2.5 mg total) by nebulization every 6 (six) hours as needed for Wheezing or Shortness of Breath. Rescue  Qty: 60 each, Refills: 0    Associated Diagnoses: Moderate persistent asthma with exacerbation      ascorbic acid, vitamin C, (VITAMIN C) 500 MG tablet Take 500 mg by mouth once daily.      atorvastatin (LIPITOR) 20 MG tablet Take 1 tablet (20 mg total) by mouth every evening.  Qty: 90 tablet, Refills: 1      chlorthalidone (HYGROTEN) 25 MG Tab Take 1 tablet (25 mg total) by mouth once daily.  Qty: 90 tablet, Refills: 3    Comments: .  Associated Diagnoses: Primary hypertension      irbesartan (AVAPRO) 300 MG tablet Take 1 tablet (300 mg total) by mouth every evening.  Qty: 90 tablet, Refills: 3    Comments: .  Associated Diagnoses: Primary hypertension      latanoprost 0.005 % ophthalmic solution Inject into the eye. 1 Drops Ophthalmic Every evening      lipase-protease-amylase 24,000-76,000-120,000 units (CREON) 24,000-76,000 -120,000 unit capsule Take 2 capsules with meals orally and 1 capsule with snacks.  Qty: 300 capsule, Refills: 11    Associated Diagnoses: Chronic pancreatitis, unspecified pancreatitis type      magnesium oxide (MAG-OX) 400 mg (241.3 mg magnesium) tablet Take 400 mg by mouth once daily.      montelukast (SINGULAIR) 10 mg tablet Take 1 tablet (10 mg total) by mouth every evening.  Qty: 30 tablet, Refills: 6    Comments: This prescription was filled on 12/27/2022. Any refills authorized will be placed on file.      olopatadine (PATANOL) 0.1 % ophthalmic solution Place 1 drop into both eyes 2 (two) times daily as needed for Allergies. 1 Drops  "Ophthalmic Twice a day      vitamin D (VITAMIN D3) 1000 units Tab Take 1,000 Units by mouth once daily.           STOP taking these medications       ACCU-CHEK GUIDE TEST STRIPS Strp Comments:   Reason for Stopping:         budesonide 1 mg/2 mL NbSp Comments:   Reason for Stopping:         lancets (ACCU-CHEK FASTCLIX LANCET DRUM) Misc Comments:   Reason for Stopping:         levocetirizine (XYZAL) 5 MG tablet Comments:   Reason for Stopping:         NEILMED SINUS RINSE REFILL Pack Comments:   Reason for Stopping:         diclofenac sodium (VOLTAREN) 1 % Gel Comments:   Reason for Stopping:         gabapentin (NEURONTIN) 100 MG capsule Comments:   Reason for Stopping:         LIDOcaine HCL 3 % Crea Comments:   Reason for Stopping:         pen needle, diabetic 31 gauge x 5/16" Ndle Comments:   Reason for Stopping:                  Immunizations Administered as of 4/2/2024       Name Date Dose VIS Date Route Exp Date    COVID-19, MRNA, LN-S, PF (Moderna) 2/25/2021 0.5 mL -- -- --    : Modern[employer]     Lot: [id number]     Comment: Adminis     COVID-19, MRNA, LN-S, PF (Moderna) 1/28/2021 0.5 mL -- -- --    : Modern[employer]     Lot: [id number]     Comment: Adminis             This patient has had both covid vaccinations    Some patients may experience side effects after vaccination.  These may include fever, headache, muscle or joint aches.  Most symptoms resolve with 24-48 hours and do not require urgent medical evaluation unless they persist for more than 72 hours or symptoms are concerning for an unrelated medical condition.          _________________________________  Glenny Layton MD  04/03/2024         "

## 2024-04-02 NOTE — PT/OT/SLP PROGRESS
"Physical Therapy Treatment    Patient Name:  Sussy Costa   MRN:  [id number]    Recommendations:     Discharge Recommendations: Moderate Intensity Therapy  Discharge Equipment Recommendations: none  Barriers to discharge: None    Assessment:     Sussy Costa is a 88 y.o. female admitted with a medical diagnosis of Closed 2-part intertrochanteric fracture of left femur.  She presents with the following impairments/functional limitations: weakness, impaired endurance, impaired functional mobility, gait instability, impaired balance, orthopedic precautions, decreased lower extremity function, impaired self care skills     Pt agreeable and tolerated PT treatment well. Pt shows progress with therapy demonstrated by increase in gait distance this session. Pt amb ~12 ft with RW and CGA. Patient continues to demonstrate the need for moderate intensity therapy on a daily basis post acute exhibited by decreased independence with self-care and functional mobility.    Rehab Prognosis: Good; patient would benefit from acute skilled PT services to address these deficits and reach maximum level of function.    Recent Surgery: Procedure(s) (LRB):  INSERTION, INTRAMEDULLARY CAYDEN, FEMUR - Left, (Left) 2 Days Post-Op    Plan:     During this hospitalization, patient to be seen  (Hip pathway 4/1, 4/2, 4/3, then 4x/week after pathway complete.) to address the identified rehab impairments via gait training, therapeutic activities, therapeutic exercises, neuromuscular re-education and progress toward the following goals:    Plan of Care Expires:  05/01/24    Subjective     Chief Complaint: Lhip pain  Patient/Family Comments/goals: "thank you for coming back"  Pain/Comfort:  Pain Rating 1: 5/10  Location - Side 1: Left  Location - Orientation 1: generalized  Location 1: hip  Pain Addressed 1: Reposition, Distraction  Pain Rating Post-Intervention 1: 7/10      Objective:     Communicated with RN prior to session.  Patient found up in " chair with telemetry, perineural catheter upon PT entry to room.     General Precautions: Standard, fall  Orthopedic Precautions: LLE weight bearing as tolerated  Braces: N/A  Respiratory Status: Room air     Functional Mobility:    Bed Mobility:   N/T 2/2 pt sitting up in chair upon PT arrival     Transfers:   Sit <> Stand Transfer: contact guard assistance from bedside chair using rolling walker     Balance:   Sitting balance: FAIR+: Maintains balance through MINIMAL excursions of active trunk motion  Standing balance:   FAIR: Maintains without assist but unable to take challenges  FAIR: Needs CONTACT GUARD during gait                 Gait:  Distance: 12 ft with chair follow  Assistive Device: RW  Assistance Level: contact guard assistance  Gait Assessment: Pt amb with decreased klaudia, decreased step length using step to gait pattern.    AM-PAC 6 CLICK MOBILITY  Turning over in bed (including adjusting bedclothes, sheets and blankets)?: 3  Sitting down on and standing up from a chair with arms (e.g., wheelchair, bedside commode, etc.): 3  Moving from lying on back to sitting on the side of the bed?: 3  Moving to and from a bed to a chair (including a wheelchair)?: 3  Need to walk in hospital room?: 3  Climbing 3-5 steps with a railing?: 2  Basic Mobility Total Score: 17       Treatment & Education:  Pt educated on tip to reduce fall risk and safety with mobility and using call button for assistance from nursing staff with OOB mobility.  Pt educated on sitting up in chair throughout most of day  All questions answered within the scope of PT.  White board updated accordingly.      Patient left up in chair with all lines intact and call button in reach..    GOALS:   Multidisciplinary Problems       Physical Therapy Goals          Problem: Physical Therapy    Goal Priority Disciplines Outcome Goal Variances Interventions   Physical Therapy Goal     PT, PT/OT Ongoing, Progressing     Description: Goals to be met by:  2024    Patient will increase functional independence with mobility by performin. Supine to sit with Stand-by Assistance  2. Sit to stand transfer with Stand-by Assistance  3. Bed to chair transfer with Supervision using Rolling Walker  4. Gait  x 100 feet with Stand-by Assistance using Rolling Walker.   5. Lower extremity exercise program x30 reps per handout, with independence                         Time Tracking:     PT Received On: 24  PT Start Time: 1309     PT Stop Time: 1326  PT Total Time (min): 17 min     Billable Minutes: Gait Training 17    Treatment Type: Treatment  PT/PTA: PT           2024

## 2024-04-02 NOTE — ASSESSMENT & PLAN NOTE
Patient has Abnormal Phosphorus: hypophosphatemia. Will continue to monitor electrolytes closely. Will replace the affected electrolytes and repeat labs to be done after interventions completed. The patient's phosphorus results have been reviewed and are listed below.  Recent Labs   Lab 04/02/24  0527   PHOS 2.4*

## 2024-04-02 NOTE — ASSESSMENT & PLAN NOTE
Sussy Costa is a 88 y.o. female with a left intertrochanteric femur fracture. She is closed and neurovascularly intact. Now s/p left femur CMN on 3/31/24 with Dr. Walker. Doing well this morning.    Plan:  Pain control: multimodal, PNC per Anesthesia  PT/OT: WBAT LLE  DVT PPx: Eliquis 2.5 mg BID, SCDs at all times when not ambulating  Labs: hemoglobin 9.6 from 8.2  Guevara: removed 4/1/24    Dispo: PT/OT recommending moderate intensity therapy. Okay for discharge from Orthopedic perspective

## 2024-04-02 NOTE — PLAN OF CARE
Problem: Adult Inpatient Plan of Care  Goal: Plan of Care Review  Outcome: Ongoing, Progressing  Goal: Patient-Specific Goal (Individualized)  Outcome: Ongoing, Progressing  Goal: Absence of Hospital-Acquired Illness or Injury  Outcome: Ongoing, Progressing  Goal: Optimal Comfort and Wellbeing  Outcome: Ongoing, Progressing  Goal: Readiness for Transition of Care  Outcome: Ongoing, Progressing     Problem: Infection  Goal: Absence of Infection Signs and Symptoms  Outcome: Ongoing, Progressing     Problem: Diabetes Comorbidity  Goal: Blood Glucose Level Within Targeted Range  Outcome: Ongoing, Progressing     Problem: Fall Injury Risk  Goal: Absence of Fall and Fall-Related Injury  Outcome: Ongoing, Progressing     Problem: Adjustment to Injury (Hip Fracture Medical Management)  Goal: Optimal Coping with Change in Health Status  Outcome: Ongoing, Progressing     Problem: Bleeding (Hip Fracture Medical Management)  Goal: Absence of Bleeding  Outcome: Ongoing, Progressing     Problem: Bowel Elimination Impaired (Hip Fracture Medical Management)  Goal: Effective Bowel Elimination  Outcome: Ongoing, Progressing     Problem: Cognitive Decline Risk (Hip Fracture Medical Management)  Goal: Baseline Cognitive Function Maintained  Outcome: Ongoing, Progressing     Problem: Embolism (Hip Fracture Medical Management)  Goal: Absence of Embolism  Outcome: Ongoing, Progressing     Problem: Fracture Stabilization and Management (Hip Fracture Medical Management)  Goal: Fracture Stability  Outcome: Ongoing, Progressing     Problem: Functional Ability Impaired (Hip Fracture Medical Management)  Goal: Optimal Functional Performance  Outcome: Ongoing, Progressing     Problem: Pain (Hip Fracture Medical Management)  Goal: Acceptable Pain Level  Outcome: Ongoing, Progressing     Problem: Urinary Elimination Impaired (Hip Fracture Medical Management)  Goal: Effective Urinary Elimination  Outcome: Ongoing, Progressing     Problem:  Bleeding (Surgery Nonspecified)  Goal: Absence of Bleeding  Outcome: Ongoing, Progressing     Problem: Bowel Motility Impaired (Surgery Nonspecified)  Goal: Effective Bowel Elimination  Outcome: Ongoing, Progressing     Problem: Fluid and Electrolyte Imbalance (Surgery Nonspecified)  Goal: Fluid and Electrolyte Balance  Outcome: Ongoing, Progressing     Problem: Glycemic Control Impaired (Surgery Nonspecified)  Goal: Blood Glucose Level Within Targeted Range  Outcome: Ongoing, Progressing     Problem: Infection (Surgery Nonspecified)  Goal: Absence of Infection Signs and Symptoms  Outcome: Ongoing, Progressing     Problem: Ongoing Anesthesia Effects (Surgery Nonspecified)  Goal: Anesthesia/Sedation Recovery  Outcome: Ongoing, Progressing     Problem: Pain (Surgery Nonspecified)  Goal: Acceptable Pain Control  Outcome: Ongoing, Progressing     Problem: Postoperative Nausea and Vomiting (Surgery Nonspecified)  Goal: Nausea and Vomiting Relief  Outcome: Ongoing, Progressing     Problem: Postoperative Urinary Retention (Surgery Nonspecified)  Goal: Effective Urinary Elimination  Outcome: Ongoing, Progressing     Problem: Respiratory Compromise (Surgery Nonspecified)  Goal: Effective Oxygenation and Ventilation  Outcome: Ongoing, Progressing     Problem: Device-Related Complication Risk (Anesthesia/Analgesia, Neuraxial)  Goal: Safe Infusion Delivery Completion  Outcome: Ongoing, Progressing     Problem: Infection (Anesthesia/Analgesia, Neuraxial)  Goal: Absence of Infection Signs and Symptoms  Outcome: Ongoing, Progressing     Problem: Nausea and Vomiting (Anesthesia/Analgesia, Neuraxial)  Goal: Nausea and Vomiting Relief  Outcome: Ongoing, Progressing     Problem: Pain (Anesthesia/Analgesia, Neuraxial)  Goal: Effective Pain Control  Outcome: Ongoing, Progressing     Problem: Respiratory Compromise (Anesthesia/Analgesia, Neuraxial)  Goal: Effective Oxygenation and Ventilation  Outcome: Ongoing, Progressing     Problem:  Sensorimotor Impairment (Anesthesia/Analgesia, Neuraxial)  Goal: Baseline Motor Function  Outcome: Ongoing, Progressing     Problem: Urinary Retention (Anesthesia/Analgesia, Neuraxial)  Goal: Effective Urinary Elimination  Outcome: Ongoing, Progressing     Problem: Impaired Wound Healing  Goal: Optimal Wound Healing  Outcome: Ongoing, Progressing     Problem: Fluid and Electrolyte Imbalance (Acute Kidney Injury/Impairment)  Goal: Fluid and Electrolyte Balance  Outcome: Ongoing, Progressing     Problem: Oral Intake Inadequate (Acute Kidney Injury/Impairment)  Goal: Optimal Nutrition Intake  Outcome: Ongoing, Progressing     Problem: Renal Function Impairment (Acute Kidney Injury/Impairment)  Goal: Effective Renal Function  Outcome: Ongoing, Progressing   She had a good day.  Pain is well controled.  She sat up in the chair for a extended period of time.  No sign of distress noted and safety precautions remain in place.

## 2024-04-02 NOTE — ADDENDUM NOTE
Addendum  created 04/02/24 1056 by Simona Miranda MD    Charge Capture section accepted, Cosign clinical note with attestation

## 2024-04-02 NOTE — PLAN OF CARE
04/02/24 1029   Post-Acute Status   Post-Acute Authorization Placement   Post-Acute Placement Status Pending medical clearance/testing   Discharge Plan   Discharge Plan A Rehab     Patient Accepted to [facility]. ANTOINE 04/03.    Jigna Ge LMSW  Case Management   [facility]   Ext. [phone]

## 2024-04-02 NOTE — ASSESSMENT & PLAN NOTE
Stool burden large seen on CT pelvis on admit and bowel regimen started. If no BM in 24 hours of admit likely will add more aggressive regimen early and mag citrate 4/2

## 2024-04-02 NOTE — SUBJECTIVE & OBJECTIVE
Interval history- feelign well this morning, had some pain last night and nursing apparently told her that could not give anythign else when ropivicaine was in and I let her know that pain team is here 24/7 and that's not the case so if she has worsening pain again can let the nurse know to call anesthesia team for pain as they can add stuff as welll when that it Is in and was not a correct statement last night. Has robaxin on as well and ice helped a ltitle, and pain team saw her this morning and aware. Hg stable at 9.6, platelest a tick low at 130 likely from surgery. Cr stable at 1.6 and within baseline ranges. Glucose improved yesterday to 100s with a slight low overnight in 80s so dec levemir/novolog this AM and in lower 100s now and will continue to trend. She reporst the day before admit she was at a BBQ and did have dietary indiscretions withe xtra dessert and BBQ so may have been reason for extreme high on admit as recovering nicely now and on similar/slightly lower to home regimen now given in the hospital may need silghtly less as likely snacking less as well so will trend further today and adjust for night dosing if on lower end with adjustments made today. [facility] for [facility] as patient's first choice once medically ready, if staying well and on track could be potentially tomorrow vs thusrday so will see how things are going . She's in good spirits, her family was on the phone as well on speaker and discsused with them above and they're on board with plan.        Review of patient's allergies indicates:   Allergen Reactions    Aspirin Other (See Comments)     Asthma    TRIAD OF NASAL POLYPS, ASA  ALLERGY AND ASTHMA.        Aspirin Other (See Comments)    Nsaids (non-steroidal anti-inflammatory drug) Other (See Comments)     AVOID DUE TO TRIAD OF ASA ALLERGY, NASAL POLYPS,AND ASTHMA  AVOID DUE TO TRIAD OF ASA ALLERGY, NASAL POLYPS,AND ASTHMA    Penicillin      Other reaction(s): Rash    9/30/20: tolerates  ceftriaxone    Penicillin g Other (See Comments)     Other reaction(s): Rash    20: tolerates ceftriaxone       No current facility-administered medications on file prior to encounter.     Current Outpatient Medications on File Prior to Encounter   Medication Sig    ACCU-CHEK GUIDE TEST STRIPS Strp TEST FOUR TIMES DAILY WITH MEALS AND NIGHTLY    albuterol (PROVENTIL) 2.5 mg /3 mL (0.083 %) nebulizer solution Take 3 mLs (2.5 mg total) by nebulization every 6 (six) hours as needed for Wheezing or Shortness of Breath. Rescue    ascorbic acid, vitamin C, (VITAMIN C) 500 MG tablet Take 500 mg by mouth once daily.    atorvastatin (LIPITOR) 20 MG tablet Take 1 tablet (20 mg total) by mouth every evening.    budesonide 1 mg/2 mL NbSp SMARTSI Vial(s) Both Nares Twice Daily    chlorthalidone (HYGROTEN) 25 MG Tab Take 1 tablet (25 mg total) by mouth once daily.    denosumab (PROLIA) 60 mg/mL Syrg Inject 60 mg into the skin every 6 (six) months.    fluticasone-umeclidin-vilanter (TRELEGY ELLIPTA) 200-62.5-25 mcg inhaler Inhale 1 puff into the lungs once daily.    irbesartan (AVAPRO) 300 MG tablet Take 1 tablet (300 mg total) by mouth every evening.    lancets (ACCU-CHEK FASTCLIX LANCET DRUM) [facility] CHECK BLOOD GLUCOSE FOUR TIMES DAILY    latanoprost 0.005 % ophthalmic solution Inject into the eye. 1 Drops Ophthalmic Every evening    levocetirizine (XYZAL) 5 MG tablet Take 1 tablet (5 mg total) by mouth every evening.    lipase-protease-amylase 24,000-76,000-120,000 units (CREON) 24,000-76,000 -120,000 unit capsule Take 2 capsules with meals orally and 1 capsule with snacks.    magnesium oxide (MAG-OX) 400 mg (241.3 mg magnesium) tablet Take 400 mg by mouth once daily.    montelukast (SINGULAIR) 10 mg tablet Take 1 tablet (10 mg total) by mouth every evening.    NEILMED SINUS RINSE REFILL Pack use as directed    olopatadine (PATANOL) 0.1 % ophthalmic solution Place 1 drop into both eyes 2 (two) times daily as needed for  "Allergies. 1 Drops Ophthalmic Twice a day    vitamin D (VITAMIN D3) 1000 units Tab Take 1,000 Units by mouth once daily.    diclofenac sodium (VOLTAREN) 1 % Gel Apply 2 g topically 4 (four) times daily.    gabapentin (NEURONTIN) 100 MG capsule Take 1 capsule (100 mg total) by mouth 3 (three) times daily. (Patient taking differently: Take 100 mg by mouth 3 (three) times daily as needed.)    insulin aspart U-100 (NOVOLOG FLEXPEN U-100 INSULIN) 100 unit/mL (3 mL) InPn pen Inject 4 Units into the skin before breakfast AND 5 Units daily with lunch AND 4 Units daily with dinner or evening meal. Plus correction scale. Max TDD 30 units..    insulin detemir U-100, Levemir, (LEVEMIR FLEXTOUCH U100 INSULIN) 100 unit/mL (3 mL) InPn pen Inject 6 Units into the skin once daily AND 5 Units every evening. (Patient taking differently: Inject 7 Units into the skin once daily AND 5 Units every evening.)    LIDOcaine HCL 3 % Crea Apply 1 application  topically 2 (two) times daily. Apply to affected area    pen needle, diabetic 31 gauge x 5/16" Ndle Use with insulin pen 5 times a day    [DISCONTINUED] risedronate (ACTONEL) 35 MG tablet Take 1 tablet (35 mg total) by mouth every 7 days.     Family History       Problem Relation (Age of Onset)    Heart disease Father, Brother    Hypertension Brother          Tobacco Use    Smoking status: Never     Passive exposure: Never    Smokeless tobacco: Never   Substance and Sexual Activity    Alcohol use: Yes     Comment: socially    Drug use: No    Sexual activity: Yes     Partners: Male     Birth control/protection: Post-menopausal     Review of Systems   Constitutional:  Positive for activity change. Negative for appetite change and chills.   HENT: Negative.     Respiratory: Negative.     Cardiovascular: Negative.    Gastrointestinal: Negative.    Genitourinary: Negative.    Musculoskeletal: Negative.    Psychiatric/Behavioral: Negative.       Objective:     Vital Signs (Most Recent):  Temp: 97 " "°F (36.1 °C) (04/02/24 0841)  Pulse: 66 (04/02/24 0909)  Resp: 18 (04/02/24 0909)  BP: (!) 187/77 (04/02/24 0841)  SpO2: 96 % (04/02/24 0909) Vital Signs (24h Range):  Temp:  [97 °F (36.1 °C)-98.3 °F (36.8 °C)] 97 °F (36.1 °C)  Pulse:  [66-99] 66  Resp:  [16-18] 18  SpO2:  [96 %-99 %] 96 %  BP: (128-187)/(59-77) 187/77     Weight: 42 kg (92 lb 9.5 oz)  Body mass index is 15.41 kg/m².     Physical Exam  Vitals and nursing note reviewed.   Constitutional:       Appearance: She is not toxic-appearing.   Eyes:      General: No scleral icterus.  Cardiovascular:      Rate and Rhythm: Regular rhythm.      Pulses: Normal pulses.   Pulmonary:      Effort: Pulmonary effort is normal. No respiratory distress.      Breath sounds: No wheezing.   Abdominal:      General: Bowel sounds are normal. There is no distension.      Palpations: Abdomen is soft.      Tenderness: There is no abdominal tenderness. There is no guarding.   Musculoskeletal:         General: Tenderness (left hip, pain w/leg movement) present.      Cervical back: Neck supple.      Right lower leg: No edema.      Left lower leg: No edema.      Comments: Bandages to LLE. Ropivicaine in place   Skin:     General: Skin is warm and dry.      Comments: Abrasions on dorsum of left hand/wrist   Neurological:      General: No focal deficit present.      Mental Status: She is alert and oriented to person, place, and time. Mental status is at baseline.   Psychiatric:         Mood and Affect: Mood normal.         Behavior: Behavior normal.                Significant Labs: All pertinent labs within the past 24 hours have been reviewed.  ABGs:   No results for input(s): "PH", "PCO2", "HCO3", "POCSATURATED", "BE", "TOTALHB", "COHB", "METHB", "O2HB", "POCFIO2", "PO2" in the last 48 hours.    CBC:   Recent Labs   Lab 03/31/24  2112 04/01/24  0412 04/02/24  0527   WBC 14.20* 10.84 12.29   HGB 8.9* 8.2* 9.6*   HCT 27.8* 24.8* 28.0*   * 105* 130*       CMP:   Recent Labs " "  Lab 04/01/24  0412 04/02/24  0527    138   K 3.6 4.4   * 110   CO2 19* 22*   GLU 87 126*   BUN 27* 27*   CREATININE 1.6* 1.6*   CALCIUM 7.2* 8.2*   ANIONGAP 5* 6*       Cardiac Markers:   No results for input(s): "CKMB", "MYOGLOBIN", "BNP", "TROPISTAT" in the last 48 hours.    Coagulation:   No results for input(s): "PT", "INR", "APTT" in the last 48 hours.    Lactic Acid: No results for input(s): "LACTATE" in the last 48 hours.  Magnesium:   Recent Labs   Lab 04/01/24 0412 04/02/24  0527   MG 1.7 1.6       Troponin:   No results for input(s): "TROPONINI", "TROPONINIHS" in the last 48 hours.    Urine Studies:   No results for input(s): "COLORU", "APPEARANCEUA", "PHUR", "SPECGRAV", "PROTEINUA", "GLUCUA", "KETONESU", "BILIRUBINUA", "OCCULTUA", "NITRITE", "UROBILINOGEN", "LEUKOCYTESUR", "RBCUA", "WBCUA", "BACTERIA", "SQUAMEPITHEL", "HYALINECASTS" in the last 48 hours.    Invalid input(s): "WRIGHTSUR"      Significant Imaging: I have reviewed all pertinent imaging results/findings within the past 24 hours.    XR HIP WITH PELVIS WHEN PERFORMED, 2 OR 3 VIEWS LEFT     CLINICAL HISTORY:  Left hip pain;     TECHNIQUE:  AP view of the pelvis and frog leg lateral view of the left hip were performed.     COMPARISON:  CT abdomen and pelvis 11/18/2023     FINDINGS:  Four views left hip.     The bilateral sacroiliac joints are intact noting degenerative change.  There is osteopenia.  There are degenerative changes of the right femoroacetabular joint.  The pubic symphysis is intact.  There is acute appearing impacted intertrochanteric fracture of the left femur.  There are surgical changes of the left inguinal region.  There is moderate to large amount of stool in the colon.  There is vascular calcification.     Impression:     1. Intertrochanteric fracture of the left femur as described.        Electronically signed by: Matteo Stallworth MD  Date:                                            03/30/2024  Time:            "                                20:17    EXAMINATION:  XR CHEST AP PORTABLE     CLINICAL HISTORY:  PRE-OP;     TECHNIQUE:  Single frontal view of the chest was performed.     COMPARISON:  11/18/2023     FINDINGS:  Cardiac monitoring leads overlie the chest.  The cardiac silhouette is stable in size and configuration.  There is prominent atherosclerotic calcification of the thoracic aorta.  Lungs demonstrate chronic coarse interstitial attenuation and scattered subsegmental opacities, similar to prior examination.  No new large confluent airspace consolidation appreciated.  No significant volume of pleural fluid or pneumothorax identified.  Presumed skin fold overlies the right hemithorax.  There is a chronic appearing right lateral 6th rib deformity.  Osseous structures demonstrate degenerative changes.     Impression:     As above.        Electronically signed by: Washington Rizo MD  Date:                                            03/30/2024  Time:                                           22:37                     XR WRIST COMPLETE 3 VIEWS LEFT     CLINICAL HISTORY:  fall;     TECHNIQUE:  PA, lateral, and oblique views of the left wrist were performed.     COMPARISON:  None     FINDINGS:  There is diffuse osteopenia.  No definite radiographic evidence of acute displaced fracture or dislocation of the left wrist.  There are mild degenerative changes present.  Soft tissues are unremarkable.     Impression:     No radiographic evidence of acute displaced fracture or dislocation of the left wrist.        Electronically signed by: Washington Rizo MD  Date:                                            03/30/2024  Time:                                           22:39

## 2024-04-02 NOTE — ANESTHESIA POST-OP PAIN MANAGEMENT
Acute Pain Service Progress Note    Sussy Costa is a 88 y.o., female, [id number].    Surgery:  INSERTION, INTRAMEDULLARY CAYDEN, FEMUR - Left, (Left: Leg)     Post Op Day #: 2    Catheter type: perineural  SIFI    Infusion type: Ropivacaine 0.2%  15mL q3hr basal    Problem List:    Active Hospital Problems    Diagnosis  POA    *Closed 2-part intertrochanteric fracture of left femur [S72.142A]  Yes    Body mass index (BMI) less than 19 [Z68.1]  Not Applicable    Thrombocytopenia [D69.6]  No    Skin tear of hand without complication, left, initial encounter [S61.412A]  Yes    Hypomagnesemia [E83.42]  Yes    Acute blood loss anemia [D62]  Yes    Vitamin D insufficiency [E55.9]  Yes    Constipation [K59.00]  Yes    ADARSH (acute kidney injury) [N17.9]  Yes    Stage 3 chronic kidney disease [N18.30]  Yes    Paroxysmal atrial fibrillation [I48.0]  Yes    Carcinoid bronchial adenoma, 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 On imaging review appears indolent. Plan for surveillance      Pancreatic insufficiency [K86.89]  Yes     Diagnosed in setting of weight loss and bowel incontinence 2022. Has chronic pancreatitis on imaging. Home medications include Creon      Type 2 diabetes mellitus with microalbuminuria, without long-term current use of insulin [E11.29, R80.9]  Yes     Home regimen includes levemir and Novolog. Last A1c 6.9 in 11/2022      Impaired mobility [Z74.09]  Yes    Hypophosphatemia [E83.39]  Yes    Asthma, moderate persistent [J45.40]  Yes     Well controlled on Trelegy, singulair, and albuterol prn.      Severe protein-calorie malnutrition [E43]  Yes     Bmi of 15.41, hypoalbuminemia       Senile osteoporosis [M81.0]  Yes    Primary hypertension [I10]  Yes     Home medications include chlorthalidone and irbesartan        Resolved Hospital Problems    Diagnosis Date Resolved POA    Preop cardiovascular exam [Z01.810] 03/31/2024 Not  Applicable       Subjective:     General appearance of alert, oriented, no complaints   Pain with rest: 0    Numbers   Pain with movement: 8    Numbers   Side Effects    1. Pruritis No    2. Nausea No    3. Motor Blockade No    4. Sedation No, 1=awake and alert    Objective:     Catheter site clean, dry, intact      Vitals   Vitals:    04/02/24 0349   BP: (!) 151/59   Pulse: 83   Resp: 16   Temp: 36.8 °C (98.3 °F)        Labs    No results displayed because visit has over 200 results.           Meds   Current Facility-Administered Medications   Medication Dose Route Frequency Provider Last Rate Last Admin    0.9%  NaCl infusion (for blood administration)   Intravenous Q24H PRN Washington Martin MD        albuterol sulfate nebulizer solution 2.5 mg  2.5 mg Nebulization Q4H PRN Deep Blevins MD        apixaban tablet 2.5 mg  2.5 mg Oral BID Washington Martin MD   2.5 mg at 04/01/24 2119    ascorbic acid (vitamin C) tablet 500 mg  500 mg Oral Daily Deep Blevins MD   500 mg at 04/01/24 0842    atorvastatin tablet 20 mg  20 mg Oral QHS Deep Blevins MD   20 mg at 04/01/24 2119    bisacodyL suppository 10 mg  10 mg Rectal Daily PRN Deep Blevins MD        calcium carbonate 200 mg calcium (500 mg) chewable tablet 500 mg  500 mg Oral TID PRN Glenny Layton MD   500 mg at 04/01/24 0915    cetirizine tablet 5 mg  5 mg Oral QHS Deep Blevins MD   5 mg at 04/01/24 2119    dextrose 10% bolus 125 mL 125 mL  12.5 g Intravenous PRN Deep Blevins MD        dextrose 10% bolus 125 mL 125 mL  12.5 g Intravenous PRN Glenny Layton MD        dextrose 10% bolus 250 mL 250 mL  25 g Intravenous PRN Deep Blevins MD        dextrose 10% bolus 250 mL 250 mL  25 g Intravenous PRN Glenny Layton MD        fluticasone furoate-vilanteroL 200-25 mcg/dose diskus inhaler 1 puff  1 puff Inhalation Daily Deep Blevins MD   1 puff at 04/01/24 5823    And    tiotropium bromide 2.5 mcg/actuation  inhaler 2 puff  2 puff Inhalation Daily Deep Blevins MD   2 puff at 04/01/24 1420    glucagon (human recombinant) injection 1 mg  1 mg Intramuscular PRN Glenny Layton MD        glucose chewable tablet 16 g  16 g Oral Glenny Blank MD        glucose chewable tablet 24 g  24 g Oral PRGlenny Gong MD        insulin aspart U-100 pen 0-10 Units  0-10 Units Subcutaneous QID (AC + HS) PRN Glenny Layton MD   4 Units at 04/01/24 1704    insulin aspart U-100 pen 4 Units  4 Units Subcutaneous TIDWM Glenny Layton MD        insulin detemir U-100 (Levemir) pen 6 Units  6 Units Subcutaneous BID Glenny Layton MD        latanoprost 0.005 % ophthalmic solution 1 drop  1 drop Both Eyes QHS Deep Blevins MD   1 drop at 04/01/24 2117    lipase-protease-amylase 24,000-76,000-120,000 units capsule 1 capsule  1 capsule Oral TID PRDeep Reid MD        lipase-protease-amylase 24,000-76,000-120,000 units capsule 2 capsule  2 capsule Oral TID  Deep Blevins MD   2 capsule at 04/01/24 1704    magnesium oxide tablet 400 mg  400 mg Oral Daily Deep Blevins MD   400 mg at 04/01/24 0842    melatonin tablet 6 mg  6 mg Oral Nightly PRN Deep Blevins MD   6 mg at 04/01/24 2119    methocarbamoL tablet 500 mg  500 mg Oral Q6H PRN Deep Blevins MD   500 mg at 04/02/24 0512    montelukast tablet 10 mg  10 mg Oral QHS Deep Blevins MD   10 mg at 04/01/24 2119    mupirocin 2 % ointment 1 g  1 g Nasal BID Deep Blevins MD   1 g at 04/01/24 2117    olopatadine 0.1 % ophthalmic solution 1 drop  1 drop Both Eyes BID PRDeep Reid MD        ondansetron disintegrating tablet 8 mg  8 mg Oral Q6H PRN Glenny Layton MD   8 mg at 03/31/24 1450    ondansetron injection 4 mg  4 mg Intravenous Q12H PRN Deep Blevins MD   4 mg at 03/31/24 2205    polyethylene glycol packet 17 g  17 g Oral Daily Deep Blevins MD   17 g at 04/01/24 1131     potassium, sodium phosphates 280-160-250 mg packet 1 packet  1 packet Oral QID (AC & HS) Glenny Layton MD        ROPIvacaine (PF) 2 mg/ml (0.2%) solution  0.1 mL/hr Perineural Continuous Deep Blevins MD 0.1 mL/hr at 04/02/24 0511 0.1 mL/hr at 04/02/24 0511    senna-docusate 8.6-50 mg per tablet 1 tablet  1 tablet Oral BID Deep Blevins MD   1 tablet at 04/01/24 2119    sodium chloride 0.9% flush 10 mL  10 mL Intravenous PRN Deep Blevins MD        tuberculin injection 5 Units  5 Units Intradermal Once Deep Blevins MD        vitamin D 1000 units tablet 1,000 Units  1,000 Units Oral Daily Deep Blevins MD   1,000 Units at 04/01/24 0842        Anticoagulant: eliquis 2.5mg BID    Assessment:     Pain control adequate. Patient walked 100ft with therapy yesterday. Overall doing well.    Plan:     Patient doing well, continue present treatment.  Continue PNC at current rate. Plan to pause/pull on 4/3.  Continue multimodal pain control - tylenol 1000mg q6hr and robaxin 500mg q6hr PRN.  Discontinued gabapentin in setting of ADARSH on CKD3 - patient states she was prescribed this after a back injury and has not taken it since October.  Avoid narcotics.    We will continue to follow along. Please call with any questions or concerns.    Discussed with staff, Dr. Miranda. See full attestation for final recommendations.    Susan Quiles,   PGY-3 Anesthesiology

## 2024-04-02 NOTE — PROGRESS NOTES
Nurses Note -- 4 Eyes      4/2/2024   7:46 AM      Skin assessed during: Q Shift Change      [x] No Altered Skin Integrity Present    []Prevention Measures Documented      [] Yes- Altered Skin Integrity Present or Discovered   [] LDA Added if Not in Epic (Describe Wound)   [] New Altered Skin Integrity was Present on Admit and Documented in LDA   [] Wound Image Taken    Wound Care Consulted? No    Attending Nurse:  Hayden Arrieta RN/Staff Member:  clarisa

## 2024-04-02 NOTE — ASSESSMENT & PLAN NOTE
She presents with uncontrolled hyperglycemia, no anion gap VBG demonstrates stable PH no ketonemia.  Her serum osmolality is 336, no altered mental status.  And based on calculating osmolality, gap is 14 within normal range.  no HHS at this time no other acute symptoms of hyperglycemic crisis.  -given 6 u of levemir on admit with glucose still 300s on 3/31 AM. Will adjust to 9 U pre op and titrate further post op in PM. Will f/u post op glucose but do at least 6 U of novolog with meals possibly more pending trend. Anesthesia asked to avoid steroids intra-op for intubation and appears did not receive.  -glucose in 80s on 3/31 PM and 4/1 AM, held levemir on 3/31 PM given this, had 10 U sliding scale on 3/31 PM, dec levemir to 5 U BID now and novolog 5 U close to home dose now, and glucose 130 after breakfast so new dosing started this AM and will trend closely given lability may likely need further titration  4/2: slightly low last PM at 80s, in low 100s this AM, dec levemir/novolog for this AM and further trend. Reported also ate at [facility] the day of admit and had extra plates + extra dessert which also may be cause of the extreme highs on admit that have resolved now  -reports at home has some lows to 80s and some highs to 300s but never 500s  -A1c 7.4  -DM diet

## 2024-04-03 VITALS
WEIGHT: 92.56 LBS | HEIGHT: 65 IN | OXYGEN SATURATION: 97 % | HEART RATE: 94 BPM | RESPIRATION RATE: 17 BRPM | SYSTOLIC BLOOD PRESSURE: 118 MMHG | DIASTOLIC BLOOD PRESSURE: 66 MMHG | TEMPERATURE: 99 F | BODY MASS INDEX: 15.42 KG/M2

## 2024-04-03 LAB
ANION GAP SERPL CALC-SCNC: 5 MMOL/L (ref 8–16)
BASOPHILS # BLD AUTO: 0.03 K/UL (ref 0–0.2)
BASOPHILS NFR BLD: 0.3 % (ref 0–1.9)
BUN SERPL-MCNC: 29 MG/DL (ref 8–23)
CALCIUM SERPL-MCNC: 7.9 MG/DL (ref 8.7–10.5)
CHLORIDE SERPL-SCNC: 109 MMOL/L (ref 95–110)
CO2 SERPL-SCNC: 25 MMOL/L (ref 23–29)
CREAT SERPL-MCNC: 1.5 MG/DL (ref 0.5–1.4)
DIFFERENTIAL METHOD BLD: ABNORMAL
EOSINOPHIL # BLD AUTO: 0.5 K/UL (ref 0–0.5)
EOSINOPHIL NFR BLD: 4.7 % (ref 0–8)
ERYTHROCYTE [DISTWIDTH] IN BLOOD BY AUTOMATED COUNT: 15 % (ref 11.5–14.5)
EST. GFR  (NO RACE VARIABLE): 33.5 ML/MIN/1.73 M^2
GLUCOSE SERPL-MCNC: 81 MG/DL (ref 70–110)
HCT VFR BLD AUTO: 24.4 % (ref 37–48.5)
HGB BLD-MCNC: 8.2 G/DL (ref 12–16)
IMM GRANULOCYTES # BLD AUTO: 0.06 K/UL (ref 0–0.04)
IMM GRANULOCYTES NFR BLD AUTO: 0.6 % (ref 0–0.5)
LYMPHOCYTES # BLD AUTO: 1.9 K/UL (ref 1–4.8)
LYMPHOCYTES NFR BLD: 19.2 % (ref 18–48)
MAGNESIUM SERPL-MCNC: 1.6 MG/DL (ref 1.6–2.6)
MCH RBC QN AUTO: 31.9 PG (ref 27–31)
MCHC RBC AUTO-ENTMCNC: 33.6 G/DL (ref 32–36)
MCV RBC AUTO: 95 FL (ref 82–98)
MONOCYTES # BLD AUTO: 0.9 K/UL (ref 0.3–1)
MONOCYTES NFR BLD: 8.9 % (ref 4–15)
NEUTROPHILS # BLD AUTO: 6.4 K/UL (ref 1.8–7.7)
NEUTROPHILS NFR BLD: 66.3 % (ref 38–73)
NRBC BLD-RTO: 0 /100 WBC
PHOSPHATE SERPL-MCNC: 3.3 MG/DL (ref 2.7–4.5)
PLATELET # BLD AUTO: 141 K/UL (ref 150–450)
PMV BLD AUTO: 12.2 FL (ref 9.2–12.9)
POCT GLUCOSE: 100 MG/DL (ref 70–110)
POCT GLUCOSE: 236 MG/DL (ref 70–110)
POTASSIUM SERPL-SCNC: 4.6 MMOL/L (ref 3.5–5.1)
RBC # BLD AUTO: 2.57 M/UL (ref 4–5.4)
SODIUM SERPL-SCNC: 139 MMOL/L (ref 136–145)
WBC # BLD AUTO: 9.67 K/UL (ref 3.9–12.7)

## 2024-04-03 PROCEDURE — 97116 GAIT TRAINING THERAPY: CPT

## 2024-04-03 PROCEDURE — 25000003 PHARM REV CODE 250: Performed by: HOSPITALIST

## 2024-04-03 PROCEDURE — 97530 THERAPEUTIC ACTIVITIES: CPT

## 2024-04-03 PROCEDURE — 25000003 PHARM REV CODE 250: Performed by: STUDENT IN AN ORGANIZED HEALTH CARE EDUCATION/TRAINING PROGRAM

## 2024-04-03 PROCEDURE — 85025 COMPLETE CBC W/AUTO DIFF WBC: CPT | Performed by: HOSPITALIST

## 2024-04-03 PROCEDURE — 99231 SBSQ HOSP IP/OBS SF/LOW 25: CPT | Mod: GC,,, | Performed by: SURGERY

## 2024-04-03 PROCEDURE — 80048 BASIC METABOLIC PNL TOTAL CA: CPT | Performed by: HOSPITALIST

## 2024-04-03 PROCEDURE — 84100 ASSAY OF PHOSPHORUS: CPT | Performed by: HOSPITALIST

## 2024-04-03 PROCEDURE — 83735 ASSAY OF MAGNESIUM: CPT | Performed by: HOSPITALIST

## 2024-04-03 PROCEDURE — 36415 COLL VENOUS BLD VENIPUNCTURE: CPT | Performed by: HOSPITALIST

## 2024-04-03 PROCEDURE — 94640 AIRWAY INHALATION TREATMENT: CPT

## 2024-04-03 PROCEDURE — 99223 1ST HOSP IP/OBS HIGH 75: CPT | Mod: ,,, | Performed by: NURSE PRACTITIONER

## 2024-04-03 PROCEDURE — 94761 N-INVAS EAR/PLS OXIMETRY MLT: CPT

## 2024-04-03 RX ORDER — INSULIN ASPART 100 [IU]/ML
5 INJECTION, SOLUTION INTRAVENOUS; SUBCUTANEOUS 3 TIMES DAILY
Refills: 0
Start: 2024-04-03 | End: 2024-06-03 | Stop reason: SDUPTHER

## 2024-04-03 RX ORDER — INSULIN ASPART 100 [IU]/ML
5 INJECTION, SOLUTION INTRAVENOUS; SUBCUTANEOUS
Status: DISCONTINUED | OUTPATIENT
Start: 2024-04-03 | End: 2024-04-03 | Stop reason: HOSPADM

## 2024-04-03 RX ORDER — INSULIN ASPART 100 [IU]/ML
0-5 INJECTION, SOLUTION INTRAVENOUS; SUBCUTANEOUS
Refills: 0
Start: 2024-04-03 | End: 2025-04-03

## 2024-04-03 RX ADMIN — POLYETHYLENE GLYCOL 3350 17 G: 17 POWDER, FOR SOLUTION ORAL at 08:04

## 2024-04-03 RX ADMIN — ACETAMINOPHEN 1000 MG: 500 TABLET ORAL at 05:04

## 2024-04-03 RX ADMIN — Medication 400 MG: at 08:04

## 2024-04-03 RX ADMIN — APIXABAN 2.5 MG: 2.5 TABLET, FILM COATED ORAL at 08:04

## 2024-04-03 RX ADMIN — FLUTICASONE FUROATE AND VILANTEROL TRIFENATATE 1 PUFF: 200; 25 POWDER RESPIRATORY (INHALATION) at 08:04

## 2024-04-03 RX ADMIN — DOCUSATE SODIUM AND SENNOSIDES 1 TABLET: 8.6; 5 TABLET, FILM COATED ORAL at 08:04

## 2024-04-03 RX ADMIN — OXYCODONE HYDROCHLORIDE AND ACETAMINOPHEN 500 MG: 500 TABLET ORAL at 08:04

## 2024-04-03 RX ADMIN — Medication 1000 UNITS: at 08:04

## 2024-04-03 RX ADMIN — MUPIROCIN 1 G: 20 OINTMENT TOPICAL at 08:04

## 2024-04-03 RX ADMIN — METHOCARBAMOL 500 MG: 500 TABLET ORAL at 05:04

## 2024-04-03 RX ADMIN — TIOTROPIUM BROMIDE INHALATION SPRAY 2 PUFF: 3.12 SPRAY, METERED RESPIRATORY (INHALATION) at 08:04

## 2024-04-03 RX ADMIN — POTASSIUM & SODIUM PHOSPHATES POWDER PACK 280-160-250 MG 1 PACKET: 280-160-250 PACK at 05:04

## 2024-04-03 RX ADMIN — PANCRELIPASE 2 CAPSULE: 24000; 76000; 120000 CAPSULE, DELAYED RELEASE PELLETS ORAL at 07:04

## 2024-04-03 NOTE — PLAN OF CARE
CHW met with patient/family at bedside. Patient experience rounding completed and reviewed the following.     Do you know your discharge plan? Yes        If yes, what is the plan? Rehab    Have you discussed your needs and preferences with your SW/CM? Yes    Do you currently have difficulty keeping up with bills, affording medicine or buying food?  No    Assigned SW/CM notified of any patient/family needs or concerns. Appropriate resources provided to address patient's needs.

## 2024-04-03 NOTE — PROGRESS NOTES
Nurses Note -- 4 Eyes      4/2/2024   8:56 PM      Skin assessed during: Daily Assessment      [x] No Altered Skin Integrity Present    []Prevention Measures Documented      [] Yes- Altered Skin Integrity Present or Discovered   [] LDA Added if Not in Epic (Describe Wound)   [] New Altered Skin Integrity was Present on Admit and Documented in LDA   [] Wound Image Taken    Wound Care Consulted? No    Attending Nurse:  Thien Arrieta RN/Staff Member:  GINA Blake

## 2024-04-03 NOTE — NURSING
Report called to [facility], Nurse Siddharth.   PIV remove with cathter intact. Patient transported via wheelchair. No acute distress noted.

## 2024-04-03 NOTE — PLAN OF CARE
Problem: Adult Inpatient Plan of Care  Goal: Plan of Care Review  Outcome: Ongoing, Progressing  Goal: Patient-Specific Goal (Individualized)  Outcome: Ongoing, Progressing  Goal: Absence of Hospital-Acquired Illness or Injury  Outcome: Ongoing, Progressing  Goal: Optimal Comfort and Wellbeing  Outcome: Ongoing, Progressing  Goal: Readiness for Transition of Care  Outcome: Ongoing, Progressing     Problem: Infection  Goal: Absence of Infection Signs and Symptoms  Outcome: Ongoing, Progressing     Problem: Diabetes Comorbidity  Goal: Blood Glucose Level Within Targeted Range  Outcome: Ongoing, Progressing     Problem: Fall Injury Risk  Goal: Absence of Fall and Fall-Related Injury  Outcome: Ongoing, Progressing     Problem: Adjustment to Injury (Hip Fracture Medical Management)  Goal: Optimal Coping with Change in Health Status  Outcome: Ongoing, Progressing     Problem: Bleeding (Hip Fracture Medical Management)  Goal: Absence of Bleeding  Outcome: Ongoing, Progressing     Problem: Embolism (Hip Fracture Medical Management)  Goal: Absence of Embolism  Outcome: Ongoing, Progressing     Problem: Fracture Stabilization and Management (Hip Fracture Medical Management)  Goal: Fracture Stability  Outcome: Ongoing, Progressing

## 2024-04-03 NOTE — PLAN OF CARE
[facility]  Discharge Final Note    Primary Care Provider: PAULA Casillas MD    Expected Discharge Date: 4/3/2024    Final Discharge Note (most recent)       Final Note - 04/03/24 1253          Final Note    Assessment Type Final Discharge Note     Anticipated Discharge Disposition Home or Self Care     What phone number can be called within the next 1-3 days to see how you are doing after discharge? --   [phone]                    Important Message from Medicare             Contact Info       Nakul Walker MD   Specialty: Sports Medicine, Orthopedic Surgery    [address]  [facility] [address]   Phone: [phone]       Next Steps: Follow up    Instructions: 2 weeks for hospital follow up          Future Appointments   Date Time Provider Department Center   4/30/2024  2:00 PM Leon Ramírez MD [facility] IM [place]   5/3/2024 10:00 AM LAB, HEMONC CANCER BLDG [facility]   5/3/2024 11:00 AM [facility] CT[facility]   5/8/2024  1:00 PM LAB, APPOINTMENT [facility] INTMED [facility]   5/8/2024  1:40 PM Nakul English MD [facility] HEMONC2 [facility]   5/15/2024  2:00 PM Heike Ye, GHISLAINE [facility] ENDODIA [address]   6/6/2024  2:00 PM Alen Campa DPM [facility] POD [facility]   6/26/2024  2:20 PM Rosangela Steel MD [facility] CHUNG [facility]   8/19/2024 11:45 AM INJECTION [facility] AMB INF [facility]     Patient discharged to [facility] on 4/3/24.    Alcira Hurtado RN  Case Management  [facility]  [phone]

## 2024-04-03 NOTE — DISCHARGE SUMMARY
DISCHARGE SUMMARY  Hospital Medicine    Team: [facility]    Patient Name: Sussy Costa  YOB: 1935    Admit Date: 3/30/2024    Discharge Date: 04/03/2024    Discharge Attending Physician: Glenny Layton MD     Principal Diagnoses:  Active Hospital Problems    Diagnosis  POA    *Closed 2-part intertrochanteric fracture of left femur [S72.142A]  Yes    Body mass index (BMI) less than 19 [Z68.1]  Not Applicable    Thrombocytopenia [D69.6]  No    Skin tear of hand without complication, left, initial encounter [S61.412A]  Yes    Hypomagnesemia [E83.42]  Yes    Acute blood loss anemia [D62]  Yes    Vitamin D insufficiency [E55.9]  Yes    Constipation [K59.00]  Yes    ADARSH (acute kidney injury) [N17.9]  Yes    Stage 3 chronic kidney disease [N18.30]  Yes    Paroxysmal atrial fibrillation [I48.0]  Yes    Carcinoid bronchial adenoma, 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 On imaging review appears indolent. Plan for surveillance      Pancreatic insufficiency [K86.89]  Yes     Diagnosed in setting of weight loss and bowel incontinence 2022. Has chronic pancreatitis on imaging. Home medications include Creon      Type 2 diabetes mellitus with microalbuminuria, without long-term current use of insulin [E11.29, R80.9]  Yes     Home regimen includes levemir and Novolog. Last A1c 6.9 in 11/2022      Impaired mobility [Z74.09]  Yes    Hypophosphatemia [E83.39]  Yes    Asthma, moderate persistent [J45.40]  Yes     Well controlled on Trelegy, singulair, and albuterol prn.      Severe protein-calorie malnutrition [E43]  Yes     Bmi of 15.41, hypoalbuminemia       Senile osteoporosis [M81.0]  Yes    Primary hypertension [I10]  Yes     Home medications include chlorthalidone and irbesartan        Resolved Hospital Problems    Diagnosis Date Resolved POA    Preop cardiovascular exam [Z01.810] 03/31/2024 Not Applicable       Discharged  Condition:  improved    Interval history- she reports feeling well today, about to eat breakfast. Glucose lower 100s after was in 200s yesterday up to 240 so titrated up slightly yesterday in insulin and then titrated back down a few units today given lower than goal for further management. Hg stable post op on labs and cr stable as well as blood pressure. Bm overnight and pain controlled with current regimen as well as icing surgical post op sites for pain control. Doing very well and she's anxious to start her recovery and accepted to [facility] for further restoration of ADLS.    Temp:  [97.7 °F (36.5 °C)-98.7 °F (37.1 °C)]   Pulse:  [67-98]   Resp:  [14-18]   BP: (105-146)/(50-67)   SpO2:  [96 %-99 %]      Physical Exam  Vitals and nursing note reviewed.   Constitutional:       Appearance: She is not toxic-appearing.   Eyes:      General: No scleral icterus.  Cardiovascular:      Rate and Rhythm: Regular rhythm.      Pulses: Normal pulses.   Pulmonary:      Effort: Pulmonary effort is normal. No respiratory distress.      Breath sounds: No wheezing.   Abdominal:      General: Bowel sounds are normal. There is no distension.      Palpations: Abdomen is soft.      Tenderness: There is no abdominal tenderness. There is no guarding.   Musculoskeletal:         General: Tenderness (left hip, pain w/leg movement) present.      Cervical back: Neck supple.      Right lower leg: No edema.      Left lower leg: No edema.      Comments: Bandages to LLE. Ropivicaine in place   Skin:     General: Skin is warm and dry.      Comments: Abrasions on dorsum of left hand/wrist   Neurological:      General: No focal deficit present.      Mental Status: She is alert and oriented to person, place, and time. Mental status is at baseline.   Psychiatric:         Mood and Affect: Mood normal.         Behavior: Behavior normal.     HOSPITAL COURSE:      Initial Presentation:    87 y/o WF hx with Type 2 DM, CKD stage 3, HTN, pancreatic  insufficiency, Asthma, Afib presents to the ED s/p fall with left hip pain.  She was at home and putting items in her fridge when she suffered a fall.  She fell onto her left hip, left arm outstretched, she did not hit her head and had no LOC.  She had immediate severe pain and was unable to stand.  EMS contacted and patient transported to the hospital, plain films show left femoral intertrochanteric fracture.   Her  was home but did not witness the fall.   & Son-in-Law present at bedside     She reports a history of left knee weakness/issues, attends outpatient PT @ [facility] for strengthening.  She has had multiple falls in recent history.  She had 10/10 pain on arrival, received Morphine 4mg IV and reports pain 5/10 during interview, her preceding blood pressures have been 190-200 systolic.   Rechecked at bedside and down to 139s.    At baseline patient performs all ADLs, toileting/bathing/clothing/preps food/manages meds/drives.       She has no active chest pain, dyspnea, no hx of CAD or CHF, no hx of stroke.  Her DASI score is 5.27 mets.  No tobacco/drug use, occasional EToh use.   GLucose on presentation 578 - pt reports she may have overate in afternoon, is adherent to home meds, reconciled at [facility]. Unsure of overall code status, remain Full code pending completion of surgery.      ED Meds: Insulin 5units IV x 1, Zofran IVx1, Morphine 4mg Iv x 1       Course of Principle Problem for Admission:    Closed 2-part intertrochanteric fracture of left femur  -sustained in mechanical fall and IT fx found on admit.  Ortho with OR 3/31 with dr garcia with IM nail with WBAT post op with PT/OT on POD , Pm and R consulted to see if rehab candidate and following.  -multimodals for pain control with anesthesia managing with ropivicaine placed  -bowel regimen with large stool burden seen on CT on admit, will add mag citrate 4/2  -vit D mildly low at 27 and replacement started  -ca out, purewick in  now  -eliquis for 35 days post op for dvt ppx with end date 4/29     Skin tear of hand without complication, left, initial encounter  Seen by wound care and recs placed        Thrombocytopenia  Patient was found to have thrombocytopenia, the likely etiology is platelet consumption from surgery , will monitor the platelets Daily. Will transfuse if platelet count is <50k (if undergoing surgical procedure or have active bleeding). Hold DVT prophylaxis if platelets are <50k. The patient's platelet results have been reviewed and are listed below.      Recent Labs   Lab 04/02/24  0527   *            ADARSH (acute kidney injury)  Patient with acute kidney injury/acute renal failure likely due to pre-renal azotemia due to IVVD ADARSH is currently improving. Baseline creatinine  1.2 to 1.7  - Labs reviewed- Renal function/electrolytes with Estimated Creatinine Clearance: 16.4 mL/min (A) (based on SCr of 1.6 mg/dL (H)). according to latest data. Monitor urine output and serial BMP and adjust therapy as needed. Avoid nephrotoxins and renally dose meds for GFR listed above.     Improved from 2.1 on admit with hyperglycemia likely also contributing to volume depletion and now at 1.7-->1.6--1.5     Constipation  Stool burden large seen on CT pelvis on admit and bowel regimen started. If no BM in 24 hours of admit likely will add more aggressive regimen early and mag citrate 4/2 and BM x 2 reported        Vitamin D insufficiency  Mildly low at 27 and replacement started        Acute blood loss anemia  Patient's anemia is currently controlled. Has received 1 units of PRBCs on 3/31 PM post op with improvement in BP and Hg to 8.2 . Etiology likely d/t acute blood loss which was from surgical losses and inflammation from fracture with baseline anemia at 9.9 and stable 4/2 at 9.6  Current CBC reviewed-         Lab Results   Component Value Date     HGB 9.6 (L) 04/02/2024     HCT 28.0 (L) 04/02/2024           Hypomagnesemia  Patient  has Abnormal Magnesium: hypomagnesemia. Will continue to monitor electrolytes closely. Will replace the affected electrolytes and repeat labs to be done after interventions completed. The patient's magnesium results have been reviewed and are listed below.      Recent Labs   Lab 03/31/24  0533   MG 1.3*         Stage 3 chronic kidney disease  Creatine stable for now. BMP reviewed- noted Estimated Creatinine Clearance: 16.4 mL/min (A) (based on SCr of 1.6 mg/dL (H)). according to latest data. Based on current GFR, CKD stage is stage 3 - GFR 30-59.  Monitor UOP and serial BMP and adjust therapy as needed. Renally dose meds. Avoid nephrotoxic medications and procedures.  Baseline Cr 1.2 to 1.7 with Fahad with Cr 2. 1 on admit. Given IVF on admit with improving Cr now to 1.6 back within baseline ranges  -on ARB at home, resume equivalent as irbesartan not on formularly now BP better 4/2     Paroxysmal atrial fibrillation  Patient with Paroxysmal (<7 days) atrial fibrillation which is controlled currently with  on no direct rate or rhythm control . Patient is currently in sinus rhythm.[id number] Score: 4. . Anticoagulation not indicated due to not on as outpatient, not indicated to start now .for this reason but is indicated for dvt ppx post op for 35 days for post hip fx     Carcinoid bronchial adenoma, right  Per overview an indolent incidental finding that is being observed with surveillance as an outpatient, on no active treatment.         Pancreatic insufficiency  -cont home dosage of Creon 120, @ 2 tabs with meals and 1 prn with snacks        Type 2 diabetes mellitus with microalbuminuria, without long-term current use of insulin  She presents with uncontrolled hyperglycemia, no anion gap VBG demonstrates stable PH no ketonemia.  Her serum osmolality is 336, no altered mental status.  And based on calculating osmolality, gap is 14 within normal range.  no HHS at this time no other acute symptoms of hyperglycemic  crisis.  -given 6 u of levemir on admit with glucose still 300s on 3/31 AM. Will adjust to 9 U pre op and titrate further post op in PM. Will f/u post op glucose but do at least 6 U of novolog with meals possibly more pending trend. Anesthesia asked to avoid steroids intra-op for intubation and appears did not receive.  -glucose in 80s on 3/31 PM and 4/1 AM, held levemir on 3/31 PM given this, had 10 U sliding scale on 3/31 PM, dec levemir to 5 U BID now and novolog 5 U close to home dose now, and glucose 130 after breakfast so new dosing started this AM and will trend closely given lability may likely need further titration  4/2: slightly low last PM at 80s, in low 100s this AM, dec levemir/novolog for this AM and further trend but then was higher during day in 200s so inc a slight bit later in day. Reported also ate at [facility] the day of admit and had extra plates + extra dessert which also may be cause of the extreme highs on admit that have resolved now  4/3: silghtly lower end at 100s so dec back down a few units today and for dc  -reports at home has some lows to 80s and some highs to 300s but never 500s  -A1c 7.4  -DM diet           Hypophosphatemia  Patient has Abnormal Phosphorus: hypophosphatemia. Will continue to monitor electrolytes closely. Will replace the affected electrolytes and repeat labs to be done after interventions completed. The patient's phosphorus results have been reviewed and are listed below.      Recent Labs   Lab 04/02/24  0527   PHOS 2.4*         Asthma, moderate persistent  Patient had recent asthma exacerbation few weeks ago - uses nebulizers BID - doing well with the PRNS here now  -Use BREO+ SPIRIVA in place of home TRELEGY  -continue home singulair.   -on discharge if trilegy on formulary at rehab can utilize this but if not can utilize breo and placed on orders regarding this.        Senile osteoporosis  Vit D mildly low at 27 and replacement started  -associated with fx related  osteoperosis  -resume home dosage  -patient on PROLIA every 6 months - last dose in Feb 2024. Can hold at rehab and resume outpatient        Primary hypertension  Chronic, controlled. Latest blood pressure and vitals reviewed-   Home meds for hypertension were reviewed and noted below.   Hypertension Medications                    chlorthalidone (HYGROTEN) 25 MG Tab Take 1 tablet (25 mg total) by mouth once daily.     irbesartan (AVAPRO) 300 MG tablet Take 1 tablet (300 mg total) by mouth every evening.                While in the hospital, will manage blood pressure as follows; Adjust home antihypertensive regimen as follows- Bp very high to 200s in ER then improved on admit to 140s pre op, dc to 90s in post op period on floor and BP meds were not given (home arb) and improved with 1 U PRB + fluid bolus.  Trending now and in 120s systolic so watch for when higher to resume home arb with biju improved   -4/2 BP elevated to 150-180 now so on irbesartan 300 at home equivalent to losartan 100 now so will start losaratn 50 to make sure not to drop too fast given lows before and if staying elevated after this later today then will add back to full dose equivalent (100) if needed and bP stable and at goal now on 4/3         Consults: ortho    Last CBC/BMP:    CBC/Anemia Labs: Coags:    Recent Labs   Lab 03/30/24 2035 03/31/24  0533 04/01/24  0412 04/02/24  0527 04/03/24  0647   WBC  --    < > 10.84 12.29 9.67   HGB  --    < > 8.2* 9.6* 8.2*   HCT  --    < > 24.8* 28.0* 24.4*   PLT  --    < > 105* 130* 141*   MCV  --    < > 97 95 95   RDW  --    < > 15.4* 15.6* 15.0*   FERRITIN 120  --   --   --   --     < > = values in this interval not displayed.    Recent Labs   Lab 03/30/24 2035 03/30/24 2345 03/31/24  0533   INR 7.2* 1.1 1.1   APTT 56.6*  --   --         Chemistries:   Recent Labs   Lab 03/1935 03/30/24 2035 03/31/24  0533 04/01/24  0412 04/02/24  0527 04/03/24  0647     --  140 136 138 139   K 4.8  --   4.1 3.6 4.4 4.6   *  --  113* 112* 110 109   CO2 18*  --  20* 19* 22* 25   BUN 36*  --  34* 27* 27* 29*   CREATININE 2.1*  --  1.7* 1.6* 1.6* 1.5*   CALCIUM 9.4  --  8.5* 7.2* 8.2* 7.9*   PROT 6.0  --  4.8*  --   --   --    BILITOT 0.3  --  0.4  --   --   --    ALKPHOS 50*  --  47*  --   --   --    ALT 34  --  46*  --   --   --    AST 24  --  36  --   --   --    MG  --    < > 1.3* 1.7 1.6 1.6   PHOS  --    < >  --  3.6 2.4* 3.3    < > = values in this interval not displayed.            Special Treatments/Procedures:   Procedure(s) (LRB):  INSERTION, INTRAMEDULLARY CAYDEN, FEMUR - Left, (Left)     Disposition: Rehab Facility      Future Scheduled Appointments:  Future Appointments   Date Time Provider Department Center   4/30/2024  2:00 PM Leon Ramírez MD [facility] IM [place]   5/3/2024 10:00 AM LAB, HEMON CANCER BLDG [facility] LAB HO [facility]   5/3/2024 11:00 AM [facility] BCC CT1 [facility] CT BENI [facility]   5/8/2024  1:00 PM LAB, APPOINTMENT [facility] INTMED [facility] LAB IM [facility]   5/8/2024  1:40 PM Nakul English MD [facility] HEMONC2 [facility]   5/15/2024  2:00 PM Heike Ye NP [facility] ENDODIA [facility]   6/6/2024  2:00 PM Alen Campa DPM [facility] POD [facility]   6/26/2024  2:20 PM Rosangela Steel MD [facility] CHUNG [facility]   8/19/2024 11:45 AM INJECTION [facility] AMB INF [facility]           Discharge Medication List:         Medication List        START taking these medications      acetaminophen 500 MG tablet  Commonly known as: TYLENOL  Take 2 tablets (1,000 mg total) by mouth every 8 (eight) hours.     apixaban 2.5 mg Tab  Commonly known as: ELIQUIS  Take 1 tablet (2.5 mg total) by mouth 2 (two) times daily. End date 4/29/24     calcium carbonate 200 mg calcium (500 mg) chewable tablet  Commonly known as: TUMS  Take 1 tablet (500 mg total) by mouth 3 (three) times daily as needed for Heartburn.     cetirizine 5 MG tablet  Commonly known as: ZYRTEC  Take 1 tablet (5 mg total) by mouth every  evening.  Replaces: levocetirizine 5 MG tablet     fluticasone furoate-vilanteroL 200-25 mcg/dose Dsdv diskus inhaler  Commonly known as: BREO  Inhale 1 puff into the lungs once daily. If home trillegy inhaler is not on formulary use this instead at rehab. If trillegy is on formulary at rehab then stop this at rehab     melatonin 3 mg tablet  Commonly known as: MELATIN  Take 2 tablets (6 mg total) by mouth nightly as needed for Insomnia.     methocarbamoL 500 MG Tab  Commonly known as: ROBAXIN  Take 1 tablet (500 mg total) by mouth every 6 (six) hours.     ondansetron 8 MG Tbdl  Commonly known as: ZOFRAN-ODT  Take 1 tablet (8 mg total) by mouth every 6 (six) hours as needed.     polyethylene glycol 17 gram Pwpk  Commonly known as: GLYCOLAX  Take 17 g by mouth once daily.     senna-docusate 8.6-50 mg 8.6-50 mg per tablet  Commonly known as: PERICOLACE  Take 1 tablet by mouth 2 (two) times daily.            CHANGE how you take these medications      * insulin aspart U-100 100 unit/mL (3 mL) Inpn pen  Commonly known as: NovoLOG Flexpen U-100 Insulin  Inject 5 Units into the skin 3 (three) times daily.  What changed: See the new instructions.     * insulin aspart U-100 100 unit/mL (3 mL) Inpn pen  Commonly known as: NovoLOG  Inject 0-5 Units into the skin before meals and at bedtime as needed (Hyperglycemia).  What changed: You were already taking a medication with the same name, and this prescription was added. Make sure you understand how and when to take each.     insulin detemir U-100 (Levemir) 100 unit/mL (3 mL) Inpn pen  Inject 4 Units into the skin 2 (two) times daily.  What changed: See the new instructions.     PROLIA 60 mg/mL Syrg  Generic drug: denosumab  Inject 1 mL (60 mg total) into the skin every 6 (six) months. Hold at rehab  What changed: additional instructions     TRELEGY ELLIPTA 200-62.5-25 mcg inhaler  Generic drug: fluticasone-umeclidin-vilanter  Inhale 1 puff into the lungs once daily. Hold at  [facility] if not on formulary  What changed: additional instructions           * This list has 2 medication(s) that are the same as other medications prescribed for you. Read the directions carefully, and ask your doctor or other care provider to review them with you.                CONTINUE taking these medications      albuterol 2.5 mg /3 mL (0.083 %) nebulizer solution  Commonly known as: PROVENTIL  Take 3 mLs (2.5 mg total) by nebulization every 6 (six) hours as needed for Wheezing or Shortness of Breath. Rescue     ascorbic acid (vitamin C) 500 MG tablet  Commonly known as: VITAMIN C     atorvastatin 20 MG tablet  Commonly known as: LIPITOR  Take 1 tablet (20 mg total) by mouth every evening.     chlorthalidone 25 MG Tab  Commonly known as: HYGROTEN  Take 1 tablet (25 mg total) by mouth once daily.     CREON 24,000-76,000 -120,000 unit capsule  Generic drug: lipase-protease-amylase 24,000-76,000-120,000 units  Take 2 capsules with meals orally and 1 capsule with snacks.     irbesartan 300 MG tablet  Commonly known as: AVAPRO  Take 1 tablet (300 mg total) by mouth every evening.     latanoprost 0.005 % ophthalmic solution     magnesium oxide 400 mg (241.3 mg magnesium) tablet  Commonly known as: MAG-OX     montelukast 10 mg tablet  Commonly known as: SINGULAIR  Take 1 tablet (10 mg total) by mouth every evening.     olopatadine 0.1 % ophthalmic solution  Commonly known as: PATANOL     vitamin D 1000 units Tab  Commonly known as: VITAMIN D3            STOP taking these medications      ACCU-CHEK GUIDE TEST STRIPS Strp  Generic drug: blood sugar diagnostic     budesonide 1 mg/2 mL Nbsp     diclofenac sodium 1 % Gel  Commonly known as: VOLTAREN     gabapentin 100 MG capsule  Commonly known as: NEURONTIN     lancets Misc  Commonly known as: ACCU-CHEK FASTCLIX LANCET DRUM     levocetirizine 5 MG tablet  Commonly known as: XYZAL  Replaced by: cetirizine 5 MG tablet     LIDOcaine HCL 3 % Crea     NEILMED SINUS RINSE REFILL  "Pack  Generic drug: sodium bicarb-sodium chloride     pen needle, diabetic 31 gauge x 5/16" Ndle               Where to Get Your Medications        Information about where to get these medications is not yet available    Ask your nurse or doctor about these medications  acetaminophen 500 MG tablet  apixaban 2.5 mg Tab  calcium carbonate 200 mg calcium (500 mg) chewable tablet  cetirizine 5 MG tablet  fluticasone furoate-vilanteroL 200-25 mcg/dose Dsdv diskus inhaler  insulin aspart U-100 100 unit/mL (3 mL) Inpn pen  insulin aspart U-100 100 unit/mL (3 mL) Inpn pen  insulin detemir U-100 (Levemir) 100 unit/mL (3 mL) Inpn pen  melatonin 3 mg tablet  methocarbamoL 500 MG Tab  ondansetron 8 MG Tbdl  polyethylene glycol 17 gram Pwpk  PROLIA 60 mg/mL Syrg  senna-docusate 8.6-50 mg 8.6-50 mg per tablet  TRELEGY ELLIPTA 200-62.5-25 mcg inhaler         Patient Instructions:  Discharge Procedure Orders   Ambulatory referral/consult to Orthopedics   Standing Status: Future   Referral Priority: Routine Referral Type: Consultation   Requested Specialty: Orthopedic Surgery   Number of Visits Requested: 1     Ambulatory referral/consult to Orthopedics Fracture Care   Standing Status: Future   Referral Priority: Routine Referral Type: Consultation   Requested Specialty: Orthopedic Surgery   Number of Visits Requested: 1     Ambulatory referral/consult to Outpatient Case Management   Referral Priority: Routine Referral Type: Consultation   Referral Reason: Specialty Services Required   Number of Visits Requested: 1       At the time of discharge patient was told to take all medications as prescribed, to keep all followup appointments, and to call their primary care physician or return to the emergency room if they have any worsening or concerning symptoms.    I spent 35 minutes preparing the discharge      Signing Physician:  Glenny Layton MD   "

## 2024-04-03 NOTE — PT/OT/SLP PROGRESS
"Physical Therapy Treatment    Patient Name:  Sussy Costa   MRN:  [id number]    Recommendations:     Discharge Recommendations: Moderate Intensity Therapy  Discharge Equipment Recommendations: none  Barriers to discharge: None    Assessment:     Sussy Costa is a 88 y.o. female admitted with a medical diagnosis of Closed 2-part intertrochanteric fracture of left femur.  She presents with the following impairments/functional limitations: weakness, impaired endurance, pain, gait instability, impaired balance, impaired functional mobility, decreased lower extremity function, impaired self care skills     Pt shows progress with therapy demonstrated by increase in gait distance this session. Pt amb a total of 32 ft with RW, CGA, and bedside chair follow. Patient continues to demonstrate the need for moderate intensity therapy on a daily basis post acute exhibited by decreased independence with self-care and functional mobility    Rehab Prognosis: Good; patient would benefit from acute skilled PT services to address these deficits and reach maximum level of function.    Recent Surgery: Procedure(s) (LRB):  INSERTION, INTRAMEDULLARY CAYDEN, FEMUR - Left, (Left) 3 Days Post-Op    Plan:     During this hospitalization, patient to be seen 4 x/week to address the identified rehab impairments via gait training, therapeutic activities, therapeutic exercises, neuromuscular re-education and progress toward the following goals:    Plan of Care Expires:  05/01/24    Subjective     Chief Complaint: L hip pain  Patient/Family Comments/goals: "I'm supposed to be going down the street today"  Pain/Comfort:  Pain Rating 1: 4/10  Location - Side 1: Left  Location - Orientation 1: generalized  Location 1: hip  Pain Addressed 1: Reposition, Distraction  Pain Rating Post-Intervention 1: 6/10      Objective:     Communicated with RN prior to session.  Patient found HOB elevated with telemetry upon PT entry to room.     General Precautions: " Standard, fall  Orthopedic Precautions: LLE weight bearing as tolerated  Braces: N/A  Respiratory Status: Room air     Functional Mobility:    Bed Mobility:   Rolling: to R with contact guard assistance  Supine > Sit: contact guard assistance    Transfers:   Sit <> Stand Transfer: contact guard assistance from EOB and  bedside chair using rolling walker     Balance:   Sitting balance: FAIR+: Maintains balance through MINIMAL excursions of active trunk motion  Standing balance:   FAIR: Maintains without assist but unable to take challenges  FAIR: Needs CONTACT GUARD during gait                 Gait:  Distance: 10 ft + 22 ft with chair follow   Assistive Device: RW  Assistance Level: contact guard assistance  Gait Assessment: Pt amb with decreased klaudia, decreased step length using step to gait pattern  Verbal cues for step sequencing      AM-PAC 6 CLICK MOBILITY  Turning over in bed (including adjusting bedclothes, sheets and blankets)?: 4  Sitting down on and standing up from a chair with arms (e.g., wheelchair, bedside commode, etc.): 3  Moving from lying on back to sitting on the side of the bed?: 3  Moving to and from a bed to a chair (including a wheelchair)?: 3  Need to walk in hospital room?: 3  Climbing 3-5 steps with a railing?: 3  Basic Mobility Total Score: 19       Treatment & Education:  Pt educated on tip to reduce fall risk and safety with mobility and using call button for assistance from nursing staff with OOB mobility.  Pt educated on sitting up in chair throughout most of day  All questions answered within the scope of PT.  White board updated accordingly.      Patient left up in chair with all lines intact and call button in reach..    GOALS:   Multidisciplinary Problems       Physical Therapy Goals          Problem: Physical Therapy    Goal Priority Disciplines Outcome Goal Variances Interventions   Physical Therapy Goal     PT, PT/OT Ongoing, Progressing     Description: Goals to be met by:  2024    Patient will increase functional independence with mobility by performin. Supine to sit with Stand-by Assistance  2. Sit to stand transfer with Stand-by Assistance  3. Bed to chair transfer with Supervision using Rolling Walker  4. Gait  x 100 feet with Stand-by Assistance using Rolling Walker.   5. Lower extremity exercise program x30 reps per handout, with independence                         Time Tracking:     PT Received On: 24  PT Start Time: 1033     PT Stop Time: 1056  PT Total Time (min): 23 min     Billable Minutes: Gait Training 23    Treatment Type: Treatment  PT/PTA: PT           2024

## 2024-04-03 NOTE — ANESTHESIA POST-OP PAIN MANAGEMENT
Acute Pain Service Progress Note    Sussy Costa is a 88 y.o., female, [id number].    Surgery:  INSERTION, INTRAMEDULLARY CAYDEN, FEMUR - Left, (Left: Leg)     Post Op Day #: 3    Catheter type: perineural  SIFI    Infusion type: Ropivacaine 0.2%  15mL q3hr basal    Problem List:    Active Hospital Problems    Diagnosis  POA    *Closed 2-part intertrochanteric fracture of left femur [S72.142A]  Yes    Body mass index (BMI) less than 19 [Z68.1]  Not Applicable    Thrombocytopenia [D69.6]  No    Skin tear of hand without complication, left, initial encounter [S61.412A]  Yes    Hypomagnesemia [E83.42]  Yes    Acute blood loss anemia [D62]  Yes    Vitamin D insufficiency [E55.9]  Yes    Constipation [K59.00]  Yes    ADARSH (acute kidney injury) [N17.9]  Yes    Stage 3 chronic kidney disease [N18.30]  Yes    Paroxysmal atrial fibrillation [I48.0]  Yes    Carcinoid bronchial adenoma, 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 On imaging review appears indolent. Plan for surveillance      Pancreatic insufficiency [K86.89]  Yes     Diagnosed in setting of weight loss and bowel incontinence 2022. Has chronic pancreatitis on imaging. Home medications include Creon      Type 2 diabetes mellitus with microalbuminuria, without long-term current use of insulin [E11.29, R80.9]  Yes     Home regimen includes levemir and Novolog. Last A1c 6.9 in 11/2022      Impaired mobility [Z74.09]  Yes    Hypophosphatemia [E83.39]  Yes    Asthma, moderate persistent [J45.40]  Yes     Well controlled on Trelegy, singulair, and albuterol prn.      Severe protein-calorie malnutrition [E43]  Yes     Bmi of 15.41, hypoalbuminemia       Senile osteoporosis [M81.0]  Yes    Primary hypertension [I10]  Yes     Home medications include chlorthalidone and irbesartan        Resolved Hospital Problems    Diagnosis Date Resolved POA    Preop cardiovascular exam [Z01.810] 03/31/2024 Not  Applicable       Subjective:     General appearance of alert, oriented, no complaints   Pain with rest: 0    Numbers   Pain with movement: 5    Numbers   Side Effects    1. Pruritis No    2. Nausea No    3. Motor Blockade No    4. Sedation No, 1=awake and alert    Objective:     Catheter site clean, dry, intact      Vitals   Vitals:    04/03/24 0800   BP:    Pulse: 72   Resp: 17   Temp:         Labs    No results displayed because visit has over 200 results.           Meds   Current Facility-Administered Medications   Medication Dose Route Frequency Provider Last Rate Last Admin    0.9%  NaCl infusion (for blood administration)   Intravenous Q24H PRN Washington Martin MD        acetaminophen tablet 1,000 mg  1,000 mg Oral Q8H QuilesSusan, DO   1,000 mg at 04/03/24 0547    albuterol sulfate nebulizer solution 2.5 mg  2.5 mg Nebulization Q4H PRN Deep Blevins MD        apixaban tablet 2.5 mg  2.5 mg Oral BID Washington Martin MD   2.5 mg at 04/03/24 0852    ascorbic acid (vitamin C) tablet 500 mg  500 mg Oral Daily Deep Blevins MD   500 mg at 04/03/24 0852    atorvastatin tablet 20 mg  20 mg Oral QHS Deep Blevins MD   20 mg at 04/02/24 2130    bisacodyL suppository 10 mg  10 mg Rectal Daily PRN Deep Blevins MD        calcium carbonate 200 mg calcium (500 mg) chewable tablet 500 mg  500 mg Oral TID PRN Glenny Layton MD   500 mg at 04/01/24 0915    cetirizine tablet 5 mg  5 mg Oral QHS Deep Blevins MD   5 mg at 04/02/24 2130    dextrose 10% bolus 125 mL 125 mL  12.5 g Intravenous PRN Deep Blevins MD        dextrose 10% bolus 125 mL 125 mL  12.5 g Intravenous PRN Glenny Layton MD        dextrose 10% bolus 250 mL 250 mL  25 g Intravenous PRN Deep Blevins MD        dextrose 10% bolus 250 mL 250 mL  25 g Intravenous PRN Glenny Layton MD        fluticasone furoate-vilanteroL 200-25 mcg/dose diskus inhaler 1 puff  1 puff Inhalation Daily Deep Blevins MD    1 puff at 04/03/24 0800    And    tiotropium bromide 2.5 mcg/actuation inhaler 2 puff  2 puff Inhalation Daily Deep Blevins MD   2 puff at 04/03/24 0800    glucagon (human recombinant) injection 1 mg  1 mg Intramuscular PRN Glenny Layton MD        glucose chewable tablet 16 g  16 g Oral PRN Glenny Layton MD   16 g at 04/02/24 2129    glucose chewable tablet 24 g  24 g Oral PRN Glenny Layton MD        insulin aspart U-100 pen 0-10 Units  0-10 Units Subcutaneous QID (AC + HS) PRN Glenny Layton MD   4 Units at 04/02/24 1729    insulin aspart U-100 pen 5 Units  5 Units Subcutaneous TIDWM Glenny Layton MD        insulin detemir U-100 (Levemir) pen 4 Units  4 Units Subcutaneous BID Shirin Hassan NP   4 Units at 04/03/24 0849    latanoprost 0.005 % ophthalmic solution 1 drop  1 drop Both Eyes QHS Deep Blevins MD   1 drop at 04/02/24 2130    lipase-protease-amylase 24,000-76,000-120,000 units capsule 1 capsule  1 capsule Oral TID PRN Deep Blevins MD        lipase-protease-amylase 24,000-76,000-120,000 units capsule 2 capsule  2 capsule Oral TID  Deep Blevins MD   2 capsule at 04/03/24 0745    losartan tablet 50 mg  50 mg Oral Daily Glenny Layton MD   50 mg at 04/02/24 1204    magnesium oxide tablet 400 mg  400 mg Oral Daily Deep Blevins MD   400 mg at 04/03/24 0852    melatonin tablet 6 mg  6 mg Oral Nightly PRN Deep Blevins MD   6 mg at 04/01/24 2119    methocarbamoL tablet 500 mg  500 mg Oral Q6H Susan Quiles DO   500 mg at 04/03/24 0548    montelukast tablet 10 mg  10 mg Oral QHS Deep Blevins MD   10 mg at 04/02/24 2130    mupirocin 2 % ointment 1 g  1 g Nasal BID Deep Blevins MD   1 g at 04/03/24 0810    olopatadine 0.1 % ophthalmic solution 1 drop  1 drop Both Eyes BID PRN Deep Blevins MD        ondansetron disintegrating tablet 8 mg  8 mg Oral Q6H PRN Glenny Layton MD   8 mg at 03/31/24 8952     ondansetron injection 4 mg  4 mg Intravenous Q12H PRN Deep Blevins MD   4 mg at 03/31/24 2203    polyethylene glycol packet 17 g  17 g Oral Daily Deep Blevins MD   17 g at 04/03/24 0852    potassium, sodium phosphates 280-160-250 mg packet 1 packet  1 packet Oral QID (AC & HS) Glenny Layton MD   1 packet at 04/03/24 0547    senna-docusate 8.6-50 mg per tablet 1 tablet  1 tablet Oral BID Deep Blevins MD   1 tablet at 04/03/24 0852    sodium chloride 0.9% flush 10 mL  10 mL Intravenous PRN Deep Blevins MD        tuberculin injection 5 Units  5 Units Intradermal Once Deep Blevins MD        vitamin D 1000 units tablet 1,000 Units  1,000 Units Oral Daily Deep Blevins MD   1,000 Units at 04/03/24 0852        Anticoagulant: eliquis 2.5mg BID    Assessment:     Pain remains well controlled. PNC paused and pulled in anticipation of upcoming discharge. Patient tolerated well.     Plan:     Patient doing well, continue present treatment.  Continue multimodal pain control - tylenol 1000mg q6hr and robaxin 500mg q6hr PRN.  Remainder per primary team.    Thank you for including us in the care of Mrs. Sussy Costa . We will sign off. Please contact us if you have any additional questions.

## 2024-04-03 NOTE — PT/OT/SLP PROGRESS
Occupational Therapy   Treatment    Name: Sussy Costa  MRN: [id number]  Admitting Diagnosis:  Closed 2-part intertrochanteric fracture of left femur  3 Days Post-Op    Recommendations:     Discharge Recommendations: Moderate Intensity Therapy  Discharge Equipment Recommendations:  none  Barriers to discharge:   none    Assessment:     Sussy Costa is a 88 y.o. female with a medical diagnosis of Closed 2-part intertrochanteric fracture of left femur.  She presents with the following performance deficits affecting function are weakness, impaired endurance, impaired self care skills, impaired functional mobility, gait instability, impaired balance, decreased lower extremity function, pain, decreased ROM, impaired cardiopulmonary response to activity.     Rehab Prognosis:  Good; patient would benefit from acute skilled OT services to address these deficits and reach maximum level of function.       Plan:     Patient to be seen daily to address the above listed problems via self-care/home management, therapeutic activities, therapeutic exercises, neuromuscular re-education  Plan of Care Expires: 04/30/24  Plan of Care Reviewed with: patient    Subjective     Chief Complaint:   Patient/Family Comments/goals:   Pain/Comfort:  Pain Rating 1: 4/10  Location - Side 1: Left  Location - Orientation 1: generalized  Location 1: hip  Pain Addressed 1: Reposition, Distraction  Pain Rating Post-Intervention 1: 6/10    Objective:     Communicated with: Nursing prior to session.  Patient found HOB elevated with telemetry upon OT entry to room.    General Precautions: Standard, fall    Orthopedic Precautions:LLE weight bearing as tolerated  Braces: N/A  Respiratory Status: Room air     Occupational Performance:     Bed Mobility:    Patient completed Rolling/Turning to Right with contact guard assistance  Patient completed Scooting/Bridging with contact guard assistance  Patient completed Supine to Sit with contact guard assistance      Functional Mobility/Transfers:  Patient completed Sit <> Stand Transfer with contact guard assistance  with  rolling walker   Functional Mobility: CGA with rolling walker ~HH distance (10 ft, 22 ft)    Activities of Daily Living:  Grooming: contact guard assistance seated EOB  Upper Body Dressing: contact guard assistance seated EOB  Lower Body Dressing: moderate assistance seated edge of bed     [facility] 6 Click ADL: 17    Treatment & Education:  -Patient and family educated on roles/goals of OT and POC.  -White board updated.  -Therapist provided time for questions and answered within scope of practice.  -Patient educated on importance of EOB/OOB activity to maximize recovery.    Patient left HOB elevated with all lines intact, call button in reach, and nursing notified    GOALS:   Multidisciplinary Problems       Occupational Therapy Goals          Problem: Occupational Therapy    Goal Priority Disciplines Outcome Interventions   Occupational Therapy Goal     OT, PT/OT Ongoing, Progressing    Description: Goals to be met by: 04/30/2024     Patient will increase functional independence with ADLs by performing:    UE Dressing with Set-up Assistance.  LE Dressing with Modified [place].  Grooming while bedside chair with Set-up Assistance.  Toileting from bedside commode with Stand-by Assistance for hygiene and clothing management.   Supine to sit with Modified [place].  Step transfer with Contact Guard Assistance                         Time Tracking:     OT Date of Treatment: 04/03/24  OT Start Time: 1033  OT Stop Time: 1056  OT Total Time (min): 23 min    Billable Minutes:Therapeutic Activity 23               4/3/2024

## 2024-04-03 NOTE — PLAN OF CARE
04/03/24 1058   Post-Acute Status   Post-Acute Authorization Placement   Post-Acute Placement Status Set-up Complete/Auth obtained   Discharge Plan   Discharge Plan A Rehab     Patient's set-up complete. [facility] order placed for a wheelchair van transport to [facility] @ 1130AM. Bedside nurse can call report to [phone]. Updated patient at bedside on son-in-law Félix by phone.      Alcira Hurtado RNCM  Case Management  [facility]  [phone]

## 2024-04-04 ENCOUNTER — TELEPHONE (OUTPATIENT)
Dept: ORTHOPEDICS | Facility: CLINIC | Age: 89
End: 2024-04-04
Payer: MEDICARE

## 2024-04-04 LAB
BLD PROD TYP BPU: NORMAL
BLD PROD TYP BPU: NORMAL
BLOOD UNIT EXPIRATION DATE: NORMAL
BLOOD UNIT EXPIRATION DATE: NORMAL
BLOOD UNIT TYPE CODE: 6200
BLOOD UNIT TYPE CODE: 6200
BLOOD UNIT TYPE: NORMAL
BLOOD UNIT TYPE: NORMAL
CODING SYSTEM: NORMAL
CODING SYSTEM: NORMAL
CROSSMATCH INTERPRETATION: NORMAL
CROSSMATCH INTERPRETATION: NORMAL
DISPENSE STATUS: NORMAL
DISPENSE STATUS: NORMAL
NUM UNITS TRANS PACKED RBC: NORMAL
NUM UNITS TRANS PACKED RBC: NORMAL

## 2024-04-04 NOTE — TELEPHONE ENCOUNTER
----- Message from Vinh Jones MD sent at 4/4/2024  7:40 AM CDT -----  Regarding: Post-Op Appointment  Please arrange follow up for this patient to be seen for a 2 week post-op appointment after L femur CMN on 3/31/24. Thanks!

## 2024-04-08 ENCOUNTER — OUTPATIENT CASE MANAGEMENT (OUTPATIENT)
Dept: ADMINISTRATIVE | Facility: OTHER | Age: 89
End: 2024-04-08
Payer: MEDICARE

## 2024-04-10 ENCOUNTER — HOSPITAL ENCOUNTER (OUTPATIENT)
Dept: RADIOLOGY | Facility: HOSPITAL | Age: 89
Discharge: HOME | End: 2024-04-10
Attending: NURSE PRACTITIONER
Payer: MEDICARE

## 2024-04-10 PROCEDURE — 93971 EXTREMITY STUDY: CPT | Mod: 26,LT,, | Performed by: RADIOLOGY

## 2024-04-11 ENCOUNTER — HOSPITAL ENCOUNTER (OUTPATIENT)
Dept: RADIOLOGY | Facility: HOSPITAL | Age: 89
Discharge: HOME | End: 2024-04-11
Attending: PHYSICAL MEDICINE & REHABILITATION
Payer: MEDICARE

## 2024-04-11 PROCEDURE — 73552 X-RAY EXAM OF FEMUR 2/>: CPT | Mod: 26,LT,, | Performed by: RADIOLOGY

## 2024-04-12 ENCOUNTER — PATIENT MESSAGE (OUTPATIENT)
Dept: ENDOCRINOLOGY | Facility: CLINIC | Age: 89
End: 2024-04-12
Payer: MEDICARE

## 2024-04-16 ENCOUNTER — OFFICE VISIT (OUTPATIENT)
Dept: ORTHOPEDICS | Facility: CLINIC | Age: 89
End: 2024-04-16
Payer: MEDICARE

## 2024-04-16 ENCOUNTER — TELEPHONE (OUTPATIENT)
Dept: ORTHOPEDICS | Facility: CLINIC | Age: 89
End: 2024-04-16

## 2024-04-16 VITALS — WEIGHT: 92.56 LBS | HEIGHT: 65 IN | BODY MASS INDEX: 15.42 KG/M2

## 2024-04-16 DIAGNOSIS — S72.142A CLOSED 2-PART INTERTROCHANTERIC FRACTURE OF LEFT FEMUR, INITIAL ENCOUNTER: ICD-10-CM

## 2024-04-16 PROCEDURE — 99024 POSTOP FOLLOW-UP VISIT: CPT | Mod: POP,,, | Performed by: PHYSICIAN ASSISTANT

## 2024-04-16 PROCEDURE — 99215 OFFICE O/P EST HI 40 MIN: CPT | Mod: PBBFAC | Performed by: PHYSICIAN ASSISTANT

## 2024-04-16 PROCEDURE — 99999 PR PBB SHADOW E&M-EST. PATIENT-LVL V: CPT | Mod: PBBFAC,,, | Performed by: PHYSICIAN ASSISTANT

## 2024-04-16 NOTE — PROGRESS NOTES
Principal Orthopedic Problem: Left intertrochanteric hip fracture, closed, displaced, initial encounter  Fall from standing     03/31/24: Dr. Walker  Open reduction internal fixation left intertrochanteric hip fracture with intramedullary nail     Ms. Costa is here today for a post-operative visit    Interval History:  she reports that she is doing well.   she is at home . she is not participating in PT/OT. Was d./c from rehab awaiting for out patient  appt  Pain is controlled.  she is  taking pain medication.  Tylenol   she denies fever, chills, and sweats .       Physical exam:    Patient arrives to exam room: wheelchair.  Patient is  accompanied    .  Incision is clean, dry and intact.   Healing well no signs of breakdown or infection.    RADS: All pertinent images were reviewed by myself:   none done today    Assessment:  Post-op visit ( 2 weeks)    Plan:  Current care, treatment plan, precautions, activity level/ modifications, limitations, rehabilitation exercises and proposed future treatment were discussed with the patient. We discussed the need to monitor for changes in symptoms and condition and report them to the physician.  Discussed importance of compliance with all appointments and follow up examinations.     WOUND CARE :  - The patient was advised to keep the incision clean and dry for the next 24 hours after which she may wash the area with antibacterial soap in the shower. Will not submerge until the incision is completely healed  -Patient was advised to monitor wound closely and multiple times daily for any problems. Call clinic immediately or report to ED for immediate medical attention for any complications including reopening of wound, drainage, purulence, redness, streaking, odor, pain out of proportion, fever, chills, etc.       ACTIVITY:   - as tolerated  -range of motion as tolerated    - WBAT      -PT/OTDaniel, Patient is responsible to establish and continue care      PAIN  MEDICATION:   - Multimodal pain control  - Pain medication: refill was not needed  - Pain medication refill policy provided to patient for review, yes.    - Patient was informed a multi-modal approach is used to treat their pain. With the goal to get off of narcotic pain medication and discontinue as soon as possible.   - ice and elevation to reduce pain and swelling     DVT PROPHYLAXIS:   - Eliquis 2.5 mg bid      FOLLOW UP:   - Patient will follow up in the clinic in 4 weeks, sooner if any concerns.  - X-ray of her femur is needed.       If there are any questions prior to scheduled follow up, the patient was instructed to contact the office

## 2024-04-18 ENCOUNTER — HOSPITAL ENCOUNTER (EMERGENCY)
Facility: HOSPITAL | Age: 89
Discharge: HOME | End: 2024-04-18
Attending: EMERGENCY MEDICINE
Payer: MEDICARE

## 2024-04-18 VITALS
OXYGEN SATURATION: 98 % | BODY MASS INDEX: 18.33 KG/M2 | RESPIRATION RATE: 20 BRPM | SYSTOLIC BLOOD PRESSURE: 148 MMHG | WEIGHT: 110 LBS | HEIGHT: 65 IN | HEART RATE: 94 BPM | TEMPERATURE: 98 F | DIASTOLIC BLOOD PRESSURE: 76 MMHG

## 2024-04-18 DIAGNOSIS — M79.89 LEG SWELLING: ICD-10-CM

## 2024-04-18 LAB
ANION GAP SERPL CALC-SCNC: 9 MMOL/L (ref 8–16)
BASOPHILS # BLD AUTO: 0.07 K/UL (ref 0–0.2)
BASOPHILS NFR BLD: 0.8 % (ref 0–1.9)
BUN SERPL-MCNC: 22 MG/DL (ref 8–23)
CALCIUM SERPL-MCNC: 9.6 MG/DL (ref 8.7–10.5)
CHLORIDE SERPL-SCNC: 115 MMOL/L (ref 95–110)
CO2 SERPL-SCNC: 20 MMOL/L (ref 23–29)
CREAT SERPL-MCNC: 1 MG/DL (ref 0.5–1.4)
DIFFERENTIAL METHOD BLD: ABNORMAL
EOSINOPHIL # BLD AUTO: 0.4 K/UL (ref 0–0.5)
EOSINOPHIL NFR BLD: 4.6 % (ref 0–8)
ERYTHROCYTE [DISTWIDTH] IN BLOOD BY AUTOMATED COUNT: 15.3 % (ref 11.5–14.5)
EST. GFR  (NO RACE VARIABLE): 54.5 ML/MIN/1.73 M^2
GLUCOSE SERPL-MCNC: 73 MG/DL (ref 70–110)
HCT VFR BLD AUTO: 30.3 % (ref 37–48.5)
HGB BLD-MCNC: 9.3 G/DL (ref 12–16)
IMM GRANULOCYTES # BLD AUTO: 0.03 K/UL (ref 0–0.04)
IMM GRANULOCYTES NFR BLD AUTO: 0.3 % (ref 0–0.5)
LYMPHOCYTES # BLD AUTO: 1.6 K/UL (ref 1–4.8)
LYMPHOCYTES NFR BLD: 18.9 % (ref 18–48)
MCH RBC QN AUTO: 31.8 PG (ref 27–31)
MCHC RBC AUTO-ENTMCNC: 30.7 G/DL (ref 32–36)
MCV RBC AUTO: 104 FL (ref 82–98)
MONOCYTES # BLD AUTO: 0.8 K/UL (ref 0.3–1)
MONOCYTES NFR BLD: 9.1 % (ref 4–15)
NEUTROPHILS # BLD AUTO: 5.8 K/UL (ref 1.8–7.7)
NEUTROPHILS NFR BLD: 66.3 % (ref 38–73)
NRBC BLD-RTO: 0 /100 WBC
PLATELET # BLD AUTO: 526 K/UL (ref 150–450)
PMV BLD AUTO: 10.7 FL (ref 9.2–12.9)
POTASSIUM SERPL-SCNC: 3.7 MMOL/L (ref 3.5–5.1)
RBC # BLD AUTO: 2.92 M/UL (ref 4–5.4)
SODIUM SERPL-SCNC: 144 MMOL/L (ref 136–145)
WBC # BLD AUTO: 8.69 K/UL (ref 3.9–12.7)

## 2024-04-18 PROCEDURE — 85025 COMPLETE CBC W/AUTO DIFF WBC: CPT

## 2024-04-18 PROCEDURE — 80048 BASIC METABOLIC PNL TOTAL CA: CPT

## 2024-04-18 PROCEDURE — 99284 EMERGENCY DEPT VISIT MOD MDM: CPT | Mod: 25

## 2024-04-18 RX ORDER — LOSARTAN POTASSIUM 50 MG/1
50 TABLET ORAL
COMMUNITY
Start: 2024-04-13 | End: 2024-05-08 | Stop reason: SDUPTHER

## 2024-04-18 NOTE — ED TRIAGE NOTES
Pt c/o weaping left leg wound after first rehab after hip surgery easter Sunday; hx blood thinners; left leg swollen and shinny pulses present

## 2024-04-18 NOTE — ED PROVIDER NOTES
Encounter Date: 4/18/2024       History     Chief Complaint   Patient presents with    Leg Swelling     Hip surg march 31, sent for ultrasound L leg swelling and draining, on eliquis already     88-year-old female with a past medical history of type 2 diabetes mellitus, CKD stage 3, HTN, asthma, atrial fibrillation and recent left hip replacement presents to the ED complaining of left lower extremity edema that onset since the hip replacement.  She states that her leg has been draining fluid and was recommended to come to the hospital by her physical therapy team today.  She also noticed that her right leg has been swelling as well.  She currently takes b.i.d. Eliquis.  She denies any active chest pain, shortness of breath at this time.  No other complaints at this time.    The history is provided by the patient and medical records.     Review of patient's allergies indicates:   Allergen Reactions    Aspirin Other (See Comments)     Asthma    TRIAD OF NASAL POLYPS, ASA  ALLERGY AND ASTHMA.        Aspirin Other (See Comments)    Nsaids (non-steroidal anti-inflammatory drug) Other (See Comments)     AVOID DUE TO TRIAD OF ASA ALLERGY, NASAL POLYPS,AND ASTHMA  AVOID DUE TO TRIAD OF ASA ALLERGY, NASAL POLYPS,AND ASTHMA    Penicillin      Other reaction(s): Rash    9/30/20: tolerates ceftriaxone    Penicillin g Other (See Comments)     Other reaction(s): Rash    9/30/20: tolerates ceftriaxone     Past Medical History:   Diagnosis Date    Asthma     Cyst of pancreas     liver    Diabetes mellitus type II     Eczema of hand     Glaucoma     History of colon cancer 10/28/2022    Right sided colon cancer diagnosed in setting of LBO requiring urgent hemicolectomy 10/25/2022 with path showing [id number] disease. CT chest 12/7/22 with possible lung metastases and MR liver with indeterminate liver lesions. 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  Patient elected for surveillance.       History of recurrent pneumonia 09/28/2020    Hyperlipidemia     Hypertension     Lichen planus     gums    Osteoporosis     Other chronic pancreatitis 03/01/2023    Pancreas cyst 03/18/2016    Pulmonary nodules     Spinal stenosis of lumbar region 08/30/2018     Past Surgical History:   Procedure Laterality Date    BRONCHOSCOPY N/A 5/13/2022    Procedure: Bronchoscopy;  Surgeon: Tina Diagnostic Provider;  [facility];  Service: Anesthesiology;  Laterality: N/A;    CATARACT EXTRACTION, BILATERAL      CHOLECYSTECTOMY      COLECTOMY, RIGHT Right 10/25/2022    Procedure: COLECTOMY, RIGHT;  Surgeon: Jass Holman MD;  [facility];  Service: Colon and Rectal;  Laterality: Right;    COLONOSCOPY N/A 6/26/2023    Procedure: COLONOSCOPY;  Surgeon: Jass Holman MD;  [facility]  Service: Endoscopy;  Laterality: N/A;  2nd floor -lung disease  referral Dr [id number] portal/mailed-GT  6/20/23- [place] confirmed- [place]    ENDOSCOPIC ULTRASOUND OF UPPER GASTROINTESTINAL TRACT N/A 4/22/2022    Procedure: ULTRASOUND, UPPER GI TRACT, ENDOSCOPIC;  Surgeon: Max Rosado MD;  [facility]  Service: Endoscopy;  Laterality: N/A;  urgent EUS (linear) for abnormal CT scan with dilated PD and increased cyst of pancreas cyst.  pap 44 mmHg  4/13:fully vaccinated. instructions via portal-[facility]    EPIDURAL STEROID INJECTION INTO LUMBAR SPINE N/A 11/8/2018    Procedure: Injection-steroid-epidural-lumbar L5-S1;  Surgeon: Nicci Osorio Jr., MD;  Location: [facility] PAIN MGT;  Service: Pain Management;  Laterality: N/A;    FUNCTIONAL ENDOSCOPIC SINUS SURGERY (FESS) USING COMPUTER-ASSISTED NAVIGATION N/A 6/17/2019    Procedure: FESS, USING COMPUTER-ASSISTED NAVIGATION;  Surgeon: Rosangela Isabel MD;  Location: [facility] OR;  Service: ENT;  Laterality: N/A;  video    HYSTERECTOMY      INTRAMEDULLARY RODDING OF FEMUR Left  3/31/2024    Procedure: INSERTION, INTRAMEDULLARY CAYDEN, FEMUR - Left,;  Surgeon: Nakul Walker MD;  Location: [facility] OR [address] [place] [address];  Service: Orthopedics;  Laterality: Left;    nasal polyps       Family History   Problem Relation Name Age of Onset    Heart disease Father      Heart disease Brother      Hypertension Brother       Social History     Tobacco Use    Smoking status: Never     Passive exposure: Never    Smokeless tobacco: Never   Substance Use Topics    Alcohol use: Yes     Comment: socially    Drug use: No     Review of Systems    Physical Exam     Initial Vitals [04/18/24 1503]   BP Pulse Resp Temp SpO2   (!) 151/80 100 20 98.1 °F (36.7 °C) 100 %      MAP       --         Physical Exam    Nursing note and vitals reviewed.  Constitutional: She appears well-developed and well-nourished. She is not diaphoretic. No distress.   HENT:   Head: Normocephalic and atraumatic.   Right Ear: External ear normal.   Left Ear: External ear normal.   Eyes: EOM are normal. Right eye exhibits no discharge. Left eye exhibits no discharge.   Neck: Neck supple.   Normal range of motion.  Cardiovascular:  Normal rate and regular rhythm.           Bilateral DP pulses intact.   Pulmonary/Chest: Breath sounds normal. No respiratory distress. She has no wheezes.   Abdominal: Abdomen is soft. Bowel sounds are normal. She exhibits no distension. There is no abdominal tenderness.   Musculoskeletal:         General: Edema (Bilateral lower extremity edema (L > R)) present. Normal range of motion.      Cervical back: Normal range of motion and neck supple.     Neurological: She is alert.   Skin: Skin is warm and dry.         ED Course   Procedures  Labs Reviewed   CBC W/ AUTO DIFFERENTIAL - Abnormal; Notable for the following components:       Result Value    RBC 2.92 (*)     Hemoglobin 9.3 (*)     Hematocrit 30.3 (*)      (*)     MCH 31.8 (*)     MCHC 30.7 (*)     RDW 15.3 (*)     Platelets 526 (*)     All other components  within normal limits   BASIC METABOLIC PANEL - Abnormal; Notable for the following components:    Chloride 115 (*)     CO2 20 (*)     eGFR 54.5 (*)     All other components within normal limits          Imaging Results              US Lower Extremity Veins Bilateral (Final result)  Result time 04/18/24 19:25:42      Final result by Brian Heath Jr., MD (04/18/24 19:25:42)                   Impression:      No evidence of deep venous thrombosis in either lower extremity.    Electronically signed by resident: Chuck Beauchamp  Date:    04/18/2024  Time:    19:14    Electronically signed by: Brian Treviño Jr  Date:    04/18/2024  Time:    19:25               Narrative:    EXAMINATION:  US LOWER EXTREMITY VEINS BILATERAL    CLINICAL HISTORY:  Other specified soft tissue disorders    TECHNIQUE:  Duplex and color flow Doppler and dynamic compression was performed of the bilateral lower extremity veins was performed.    COMPARISON:  Ultrasound left lower extremity veins 04/10/2024, 07/06/2022    FINDINGS:  Right thigh veins: The common femoral, femoral, popliteal, and upper greater saphenous veins are patent and free of thrombus. The veins are normally compressible and have normal phasic flow and augmentation response.    Right calf veins: The visualized calf veins are patent.    Left thigh veins: The common femoral, femoral, popliteal, and upper greater saphenous veins are patent and free of thrombus. The veins are normally compressible and have normal phasic flow and augmentation response.    Left calf veins: The visualized calf veins are patent.    Miscellaneous: Collection in the right popliteal fossa measuring 2.4 x 1.4 x 2.0 cm.  Similar collection in the left popliteal fossa measuring 2.2 x 1.0 x 1.5 cm.  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  Subcutaneous edema most pronounced in the bilateral calves.                                       Medications - No data to display  Medical Decision  Making  88-year-old female who appears to be in NAD presents to the ED with  at bedside complaining of left lower extremity edema with associated weeping.  ABC's intact.  Initial triage vital signs reveal borderline tachycardia and hypertension.    Differential diagnosis includes but is not limited to DVT, cellulitis, fluid overload, unlikely arterial occlusion    Amount and/or Complexity of Data Reviewed  Labs: ordered. Decision-making details documented in ED Course.  Radiology:  Decision-making details documented in ED Course.               ED Course as of 04/18/24 2040   Thu Apr 18, 2024 1831 CBC auto differential(!)  No leukocytosis.  Stable anemia. [BG]   1831 Basic metabolic panel(!)  Mild decrease in bicarb.  Mild elevation in chloride.  Otherwise grossly unremarkable. [BG]   1929 US Lower Extremity Veins Bilateral  No evidence of deep venous thrombosis in either lower extremity. [BG]   2030 I discussed the labs and imaging with the patient.  I recommended that she use compression socks and elevate her lower extremities while she sleeps.  She states that she understands.  Strict return precautions provided.  Will discharge the patient. [BG]      ED Course User Index  [BG] Carlos Valles MD                           Clinical Impression:  Final diagnoses:  [M79.89] Leg swelling          ED Disposition Condition    Discharge Stable          ED Prescriptions    None       Follow-up Information       Follow up With Specialties Details Why Contact Info    [facility] - Emergency Dept Emergency Medicine Go to  If symptoms worsen [address]  [facility] [id number] [phone]    [facility] ORTHOPED[facility] Orthopedics Call   [address]  [facility] [id number]  [phone]             Carlos Valles MD  Resident  04/18/24 2040

## 2024-04-19 ENCOUNTER — OFFICE VISIT (OUTPATIENT)
Dept: INTERNAL MEDICINE | Facility: CLINIC | Age: 89
End: 2024-04-19
Payer: MEDICARE

## 2024-04-19 VITALS
WEIGHT: 116.94 LBS | TEMPERATURE: 98 F | RESPIRATION RATE: 16 BRPM | HEART RATE: 96 BPM | BODY MASS INDEX: 19.48 KG/M2 | SYSTOLIC BLOOD PRESSURE: 128 MMHG | OXYGEN SATURATION: 99 % | DIASTOLIC BLOOD PRESSURE: 60 MMHG | HEIGHT: 65 IN

## 2024-04-19 DIAGNOSIS — I70.0 AORTIC ATHEROSCLEROSIS: ICD-10-CM

## 2024-04-19 DIAGNOSIS — C18.9 MALIGNANT NEOPLASM OF COLON, UNSPECIFIED PART OF COLON: ICD-10-CM

## 2024-04-19 DIAGNOSIS — E11.9 DIABETES MELLITUS WITH INSULIN THERAPY: ICD-10-CM

## 2024-04-19 DIAGNOSIS — S72.142D CLOSED 2-PART INTERTROCHANTERIC FRACTURE OF LEFT FEMUR WITH ROUTINE HEALING, SUBSEQUENT ENCOUNTER: ICD-10-CM

## 2024-04-19 DIAGNOSIS — Z79.4 DIABETES MELLITUS WITH INSULIN THERAPY: ICD-10-CM

## 2024-04-19 DIAGNOSIS — Z09 HOSPITAL DISCHARGE FOLLOW-UP: Primary | ICD-10-CM

## 2024-04-19 DIAGNOSIS — M79.89 SWELLING OF LEFT LOWER EXTREMITY: ICD-10-CM

## 2024-04-19 DIAGNOSIS — K86.1 OTHER CHRONIC PANCREATITIS: ICD-10-CM

## 2024-04-19 PROCEDURE — 99999 PR PBB SHADOW E&M-EST. PATIENT-LVL III: CPT | Mod: PBBFAC,,, | Performed by: INTERNAL MEDICINE

## 2024-04-19 PROCEDURE — 99213 OFFICE O/P EST LOW 20 MIN: CPT | Mod: PBBFAC,PO | Performed by: INTERNAL MEDICINE

## 2024-04-19 PROCEDURE — 99214 OFFICE O/P EST MOD 30 MIN: CPT | Mod: S$PBB,,, | Performed by: INTERNAL MEDICINE

## 2024-04-19 RX ORDER — PREGABALIN 25 MG/1
25 CAPSULE ORAL 2 TIMES DAILY
Qty: 60 CAPSULE | Refills: 0 | Status: SHIPPED | OUTPATIENT
Start: 2024-04-19 | End: 2024-05-19

## 2024-04-19 NOTE — ED NOTES
Took report from Bowen FUENTES, and assumed care of pt at this time. Pt resting comfortably and independently repositioned in stretcher with bed locked in lowest position for safety. NAD noted at this time. Respirations even and unlabored and visible chest rise noted.  Patient offered bathroom assistance and denies need at this time. Pt instructed to call if assistance is needed.  Call light within reach. Family at bedside. No needs at this time. Will continue to monitor.

## 2024-04-19 NOTE — PROGRESS NOTES
Subjective:       Patient ID: Sussy Costa is a 88 y.o. female.    Chief Complaint: Follow-up    HPI    88-year-old female with malignant neoplasm of the colon, other chronic pancreatitis, aortic atherosclerosis here for hospital follow-up.  Discharged 04/03/2024 to skilled nursing.  Discharge from skilled nursing on 04/12/2024    Family and/or Caretaker present at visit?  Yes.  Diagnostic tests reviewed/disposition: I have reviewed all completed as well as pending diagnostic tests at the time of discharge.  Disease/illness education: femur fracture  Home health/community services discussion/referrals: Patient does not have home health established from hospital visit.  They do not need home health.  If needed, we will set up home health for the patient.   Establishment or re-establishment of referral orders for community resources: No other necessary community resources.   Discussion with other health care providers: No discussion with other health care providers necessary.  I reviewed and reconciled the medications from hospital discharge.    She went to the ER yesterday for concern about a blood clot.  She was found to have no clot.  Her leg was leaking.  This is the leg she had the surgery on.      She has a shooting pain. When she tries to get in bed, it hurts a lot.      Discharge summary:  HOSPITAL COURSE:       Initial Presentation:     89 y/o WF hx with Type 2 DM, CKD stage 3, HTN, pancreatic insufficiency, Asthma, Afib presents to the ED s/p fall with left hip pain.  She was at home and putting items in her fridge when she suffered a fall.  She fell onto her left hip, left arm outstretched, she did not hit her head and had no LOC.  She had immediate severe pain and was unable to stand.  EMS contacted and patient transported to the hospital, plain films show left femoral intertrochanteric fracture.   Her  was home but did not witness the fall.   & Son-in-Law present at bedside     She  reports a history of left knee weakness/issues, attends outpatient PT @ [facility] for strengthening.  She has had multiple falls in recent history.  She had 10/10 pain on arrival, received Morphine 4mg IV and reports pain 5/10 during interview, her preceding blood pressures have been 190-200 systolic.   Rechecked at bedside and down to 139s.    At baseline patient performs all ADLs, toileting/bathing/clothing/preps food/manages meds/drives.       She has no active chest pain, dyspnea, no hx of CAD or CHF, no hx of stroke.  Her DASI score is 5.27 mets.  No tobacco/drug use, occasional EToh use.   GLucose on presentation 578 - pt reports she may have overate in afternoon, is adherent to home meds, reconciled at [facility]. Unsure of overall code status, remain Full code pending completion of surgery.      ED Meds: Insulin 5units IV x 1, Zofran IVx1, Morphine 4mg Iv x 1        Course of Principle Problem for Admission:     Closed 2-part intertrochanteric fracture of left femur  -sustained in mechanical fall and IT fx found on admit.  Ortho with OR 3/31 with dr garcia with IM nail with WBAT post op with PT/OT on POD , Pm and R consulted to see if rehab candidate and following.  -multimodals for pain control with anesthesia managing with ropivicaine placed  -bowel regimen with large stool burden seen on CT on admit, will add mag citrate 4/2  -vit D mildly low at 27 and replacement started  -ca out, micah in now  -eliquis for 35 days post op for dvt ppx with end date 4/29     Skin tear of hand without complication, left, initial encounter  Seen by wound care and recs placed        Thrombocytopenia  Patient was found to have thrombocytopenia, the likely etiology is platelet consumption from surgery , will monitor the platelets Daily. Will transfuse if platelet count is <50k (if undergoing surgical procedure or have active bleeding). Hold DVT prophylaxis if platelets are <50k. The patient's platelet results have  been reviewed and are listed below.        Recent Labs   Lab 04/02/24  0527   *            ADARSH (acute kidney injury)  Patient with acute kidney injury/acute renal failure likely due to pre-renal azotemia due to IVVD ADARSH is currently improving. Baseline creatinine  1.2 to 1.7  - Labs reviewed- Renal function/electrolytes with Estimated Creatinine Clearance: 16.4 mL/min (A) (based on SCr of 1.6 mg/dL (H)). according to latest data. Monitor urine output and serial BMP and adjust therapy as needed. Avoid nephrotoxins and renally dose meds for GFR listed above.     Improved from 2.1 on admit with hyperglycemia likely also contributing to volume depletion and now at 1.7-->1.6--1.5     Constipation  Stool burden large seen on CT pelvis on admit and bowel regimen started. If no BM in 24 hours of admit likely will add more aggressive regimen early and mag citrate 4/2 and BM x 2 reported        Vitamin D insufficiency  Mildly low at 27 and replacement started        Acute blood loss anemia  Patient's anemia is currently controlled. Has received 1 units of PRBCs on 3/31 PM post op with improvement in BP and Hg to 8.2 . Etiology likely d/t acute blood loss which was from surgical losses and inflammation from fracture with baseline anemia at 9.9 and stable 4/2 at 9.6  Current CBC reviewed-             Lab Results   Component Value Date     HGB 9.6 (L) 04/02/2024     HCT 28.0 (L) 04/02/2024            Hypomagnesemia  Patient has Abnormal Magnesium: hypomagnesemia. Will continue to monitor electrolytes closely. Will replace the affected electrolytes and repeat labs to be done after interventions completed. The patient's magnesium results have been reviewed and are listed below.        Recent Labs   Lab 03/31/24  0533   MG 1.3*         Stage 3 chronic kidney disease  Creatine stable for now. BMP reviewed- noted Estimated Creatinine Clearance: 16.4 mL/min (A) (based on SCr of 1.6 mg/dL (H)). according to latest data. Based on  current GFR, CKD stage is stage 3 - GFR 30-59.  Monitor UOP and serial BMP and adjust therapy as needed. Renally dose meds. Avoid nephrotoxic medications and procedures.  Baseline Cr 1.2 to 1.7 with Fahad with Cr 2. 1 on admit. Given IVF on admit with improving Cr now to 1.6 back within baseline ranges  -on ARB at home, resume equivalent as irbesartan not on formularly now BP better 4/2     Paroxysmal atrial fibrillation  Patient with Paroxysmal (<7 days) atrial fibrillation which is controlled currently with  on no direct rate or rhythm control . Patient is currently in sinus rhythm.[id number] Score: 4. . Anticoagulation not indicated due to not on as outpatient, not indicated to start now .for this reason but is indicated for dvt ppx post op for 35 days for post hip fx     Carcinoid bronchial adenoma, right  Per overview an indolent incidental finding that is being observed with surveillance as an outpatient, on no active treatment.         Pancreatic insufficiency  -cont home dosage of Creon 120, @ 2 tabs with meals and 1 prn with snacks        Type 2 diabetes mellitus with microalbuminuria, without long-term current use of insulin  She presents with uncontrolled hyperglycemia, no anion gap VBG demonstrates stable PH no ketonemia.  Her serum osmolality is 336, no altered mental status.  And based on calculating osmolality, gap is 14 within normal range.  no HHS at this time no other acute symptoms of hyperglycemic crisis.  -given 6 u of levemir on admit with glucose still 300s on 3/31 AM. Will adjust to 9 U pre op and titrate further post op in PM. Will f/u post op glucose but do at least 6 U of novolog with meals possibly more pending trend. Anesthesia asked to avoid steroids intra-op for intubation and appears did not receive.  -glucose in 80s on 3/31 PM and 4/1 AM, held levemir on 3/31 PM given this, had 10 U sliding scale on 3/31 PM, dec levemir to 5 U BID now and novolog 5 U close to home dose now, and glucose  130 after breakfast so new dosing started this AM and will trend closely given lability may likely need further titration  4/2: slightly low last PM at 80s, in low 100s this AM, dec levemir/novolog for this AM and further trend but then was higher during day in 200s so inc a slight bit later in day. Reported also ate at [employer] the day of admit and had extra plates + extra dessert which also may be cause of the extreme highs on admit that have resolved now  4/3: silghtly lower end at 100s so dec back down a few units today and for dc  -reports at home has some lows to 80s and some highs to 300s but never 500s  -A1c 7.4  -DM diet           Hypophosphatemia  Patient has Abnormal Phosphorus: hypophosphatemia. Will continue to monitor electrolytes closely. Will replace the affected electrolytes and repeat labs to be done after interventions completed. The patient's phosphorus results have been reviewed and are listed below.        Recent Labs   Lab 04/02/24  0527   PHOS 2.4*         Asthma, moderate persistent  Patient had recent asthma exacerbation few weeks ago - uses nebulizers BID - doing well with the PRNS here now  -Use BREO+ SPIRIVA in place of home TRELEGY  -continue home singulair.   -on discharge if trilegy on formulary at rehab can utilize this but if not can utilize breo and placed on orders regarding this.        Senile osteoporosis  Vit D mildly low at 27 and replacement started  -associated with fx related osteoperosis  -resume home dosage  -patient on PROLIA every 6 months - last dose in Feb 2024. Can hold at rehab and resume outpatient        Primary hypertension  Chronic, controlled. Latest blood pressure and vitals reviewed-   Home meds for hypertension were reviewed and noted below.   Hypertension Medications        Discharge summary from skilled nursing:  History of Present Illness/Past Medical History:  Ms. Sussy Costa is a 88 y.o. female with Type 2 DM, CKD stage 3, HTN, pancreatic insufficiency,  Asthma, Afib presented to the [facility] ED s/p fall with left hip pain. Fell at home onto her left hip unable to stand, left arm outstretched, she did not hit her head and had no LOC. She had immediate severe pain and was unable to stand. EMS contacted and patient transported to the hospital, plain films show left femoral intertrochanteric fracture. Ortho consulted. To OR 3/31 with Dr Walker with IM nail with WBAT. Pain well controlled on multimodal regimen. eliquis for 35 days post op for dvt ppx with end date 4/29.     History of diabetes type 2. Glucose on presentation 578 - pt reports she may have overate prior to admission. Glucose improved now. Reports having dietary indiscretions withe xtra dessert and BBQ so may have been reason for extreme high sugar on admit. Recovered nicely on similar/slightly lower to home regimen given in the hospital. Will trend blood sugars and adjust as needed.       Hospital Course:  Patient was admitted and enrolled in a comprehensive rehabilitation program, including PT, OT, and as required SLP. Hospital course was significant for multiple medical problems that were treated and monitored while in IRF.     Pain Management: Tylenol PRN for mild pain,   - methocarbamol 500 mg q6hr  - will continue to adjust pain medication as clinically indicated       MEDICAL MANAGEMENT:     * Closed 2-part intertrochanteric fracture of left femur  -sustained in mechanical fall and IT fx found on admit.  Ortho consulted - OR 3/31 with Dr Walker with IM nail, WBAT post op PT/OT eval and treat.  -multimodals for pain control   -vit D mildly low at 27 and replacement started  -eliquis for 35 days post op for dvt ppx with end date 4/29  -4/12/24: had more pain, swelling, and new bruising  Ultrasound obtained - negative for DVT  Xray of hip - hardware in place, no acute changes  Has f/u scheduled as outpt    Skin tear of hand without complication, left, initial encounter  Seen by wound care and recs  placed      Thrombocytopenia  Patient was found to have thrombocytopenia, the likely etiology is platelet consumption from surgery  Will monitor the platelets M/Th  Hold DVT prophylaxis if platelets are <50k     ADARSH (acute kidney injury)  Patient with acute kidney injury/acute renal failure likely due to pre-renal azotemia due to IVVD ADARSH is currently improving. Baseline creatinine 1.2 to 1.7   - Monitor urine output and serial BMP and adjust therapy as needed.   - Avoid nephrotoxins and renally dose meds     Constipation  Stool burden large seen on CT pelvis on admit and bowel regimen started.     Vitamin D insufficiency  Mildly low at 27 and replacement started    Anemia of chronic disease  Patient's anemia is currently controlled. Monitor serial CBC and transfuse if patient becomes hemodynamically unstable, symptomatic or H/H drops below 7/21.  4/8/24: h/h lower this am, repeat tomorrow  4/9/24: no signs of acute bleed, continue to monitor closely, repeat labs Thursday morning  4/10/24: h/h improved     Hypomagnesemia  Patient has Abnormal Magnesium:  Will continue to monitor electrolytes closely.   Will replace the affected electrolytes and repeat labs to be done after interventions completed.  4/8/24: low Mg 1.4, give extra 400 mg dose  4/11/24: Mg 1.5, increase Mg 400 mg BID      Stage 3 chronic kidney disease  Creatine stable for now.   CKD stage is stage 3 - GFR 30-59.   Monitor UOP and serial BMP and adjust therapy as needed.   Renally dose meds.   Avoid nephrotoxic medications and procedures.    Paroxysmal atrial fibrillation  Patient with Paroxysmal (<7 days) atrial fibrillation which is controlled currently .   Patient is in sinus rhythm.  [id number] Score: 4. Anticoagulation not indicated     Carcinoid bronchial adenoma, right  Per overview an indolent incidental finding that is being observed with surveillance as an outpatient, on no active treatment.     Pancreatic insufficiency  -cont home dosage of  Creon 120, @ 2 tabs with meals and 1 prn with snacks     Type 2 diabetes mellitus with microalbuminuria and hyperglycemia, without long-term current use of insulin  Presented with uncontrolled hyperglycemia,   - Lantus 4 U nightly and lispro 5 U with meals  - A1c 7.4  -DM diet  -monitor BG AC & HS  - IM consulted and managed  - will continue lantus 4 units nightly, and lispro 5 units with meals, plus sliding scale      Hypophosphatemia  Patient has Abnormal Phosphorus:   Will continue to monitor electrolytes. Will replace the affected electrolytes and repeat labs to be done after interventions completed.     Asthma, moderate persistent  Patient had recent asthma exacerbation few weeks ago - uses nebulizers BID - doing well with the PRNS here now  -Use BREO+ SPIRIVA in place of home TRELEGY  -continue home singulair.   - resume trelegy at home      Senile osteoporosis  Vit D mildly low at 27 and replacement started  -associated with fx related osteoperosis  -resume home dosage  -patient on PROLIA every 6 months - last dose in Feb 2024      Primary hypertension  Chronic, controlled. Latest blood pressure and vitals reviewed-   Home meds for hypertension were reviewed and noted below.   Continue losartan 50 mg po daily       Review of Systems      Objective:      Physical Exam  Vitals reviewed.   Constitutional:       Appearance: She is well-developed.   HENT:      Head: Normocephalic and atraumatic.      Mouth/Throat:      Pharynx: No oropharyngeal exudate.   Eyes:      General: No scleral icterus.        Right eye: No discharge.         Left eye: No discharge.      Pupils: Pupils are equal, round, and reactive to light.   Neck:      Thyroid: No thyromegaly.      Trachea: No tracheal deviation.   Cardiovascular:      Rate and Rhythm: Normal rate and regular rhythm.      Heart sounds: Normal heart sounds. No murmur heard.     No friction rub. No gallop.   Pulmonary:      Effort: Pulmonary effort is normal. No  respiratory distress.      Breath sounds: Normal breath sounds. No wheezing or rales.   Chest:      Chest wall: No tenderness.   Abdominal:      General: Bowel sounds are normal. There is no distension.      Palpations: Abdomen is soft. There is no mass.      Tenderness: There is no abdominal tenderness. There is no guarding or rebound.   Musculoskeletal:         General: No tenderness. Normal range of motion.      Cervical back: Normal range of motion and neck supple.   Skin:     General: Skin is warm and dry.      Coloration: Skin is not pale.      Findings: No erythema or rash.   Neurological:      Mental Status: She is alert and oriented to person, place, and time.   Psychiatric:         Behavior: Behavior normal.         Assessment:       1. Hospital discharge follow-up    2. Closed 2-part intertrochanteric fracture of left femur with routine healing, subsequent encounter  - pregabalin (LYRICA) 25 MG capsule; Take 1 capsule (25 mg total) by mouth 2 (two) times daily.  Dispense: 60 capsule; Refill: 0    3. Swelling of left lower extremity  - COMPRESSION STOCKINGS    4. Diabetes mellitus with insulin therapy  - Microalbumin/Creatinine Ratio, Urine; Future    5. Malignant neoplasm of colon, unspecified part of colon    6. Other chronic pancreatitis    7. Aortic atherosclerosis      Plan:       1/2.  Lyrica 25 mg b.i.d. prescribed.    3.  Compression stockings ordered   4. Check urine microalbumin.    5/6/7.  Monitor.

## 2024-04-19 NOTE — DISCHARGE INSTRUCTIONS
Please return to the emergency department if you develop any new or worsening symptoms.  This could include worsening swelling, worsening pain, shortness of breath, chest pain, fevers.    Otherwise follow-up with your primary care physician as needed.    Please elevate your legs while you sleep and wear compression socks to help alleviate the symptoms of swelling.

## 2024-04-22 ENCOUNTER — LAB VISIT (OUTPATIENT)
Dept: LAB | Facility: HOSPITAL | Age: 89
End: 2024-04-22
Attending: INTERNAL MEDICINE
Payer: MEDICARE

## 2024-04-22 DIAGNOSIS — E11.9 DIABETES MELLITUS WITH INSULIN THERAPY: ICD-10-CM

## 2024-04-22 DIAGNOSIS — Z79.4 DIABETES MELLITUS WITH INSULIN THERAPY: ICD-10-CM

## 2024-04-22 LAB
ALBUMIN/CREAT UR: 51.7 UG/MG (ref 0–30)
CREAT UR-MCNC: 60 MG/DL (ref 15–325)
MICROALBUMIN UR DL<=1MG/L-MCNC: 31 UG/ML

## 2024-04-22 PROCEDURE — 82043 UR ALBUMIN QUANTITATIVE: CPT | Performed by: INTERNAL MEDICINE

## 2024-04-22 RX ORDER — MONTELUKAST SODIUM 10 MG/1
10 TABLET ORAL NIGHTLY
Qty: 30 TABLET | Refills: 6 | Status: SHIPPED | OUTPATIENT
Start: 2024-04-22

## 2024-04-29 ENCOUNTER — HOSPITAL ENCOUNTER (INPATIENT)
Facility: HOSPITAL | Age: 89
LOS: 3 days | Discharge: HOME HEALTH | DRG: 603 | End: 2024-05-02
Attending: STUDENT IN AN ORGANIZED HEALTH CARE EDUCATION/TRAINING PROGRAM | Admitting: STUDENT IN AN ORGANIZED HEALTH CARE EDUCATION/TRAINING PROGRAM
Payer: MEDICARE

## 2024-04-29 DIAGNOSIS — R60.0 BILATERAL LEG EDEMA: ICD-10-CM

## 2024-04-29 DIAGNOSIS — M25.551 RIGHT HIP PAIN: Primary | ICD-10-CM

## 2024-04-29 DIAGNOSIS — L03.90 CELLULITIS, UNSPECIFIED CELLULITIS SITE: ICD-10-CM

## 2024-04-29 DIAGNOSIS — R26.2 AMBULATORY DYSFUNCTION: ICD-10-CM

## 2024-04-29 LAB
ALBUMIN SERPL BCP-MCNC: 2.7 G/DL (ref 3.5–5.2)
ALP SERPL-CCNC: 111 U/L (ref 55–135)
ALT SERPL W/O P-5'-P-CCNC: 8 U/L (ref 10–44)
ANION GAP SERPL CALC-SCNC: 9 MMOL/L (ref 8–16)
AST SERPL-CCNC: 12 U/L (ref 10–40)
BACTERIA #/AREA URNS AUTO: NORMAL /HPF
BASOPHILS # BLD AUTO: 0.05 K/UL (ref 0–0.2)
BASOPHILS NFR BLD: 0.4 % (ref 0–1.9)
BILIRUB SERPL-MCNC: 0.6 MG/DL (ref 0.1–1)
BILIRUB UR QL STRIP: NEGATIVE
BUN SERPL-MCNC: 23 MG/DL (ref 8–23)
CALCIUM SERPL-MCNC: 8.6 MG/DL (ref 8.7–10.5)
CHLORIDE SERPL-SCNC: 112 MMOL/L (ref 95–110)
CK SERPL-CCNC: 28 U/L (ref 20–180)
CLARITY UR REFRACT.AUTO: CLEAR
CO2 SERPL-SCNC: 19 MMOL/L (ref 23–29)
COLOR UR AUTO: YELLOW
CREAT SERPL-MCNC: 1 MG/DL (ref 0.5–1.4)
CRP SERPL-MCNC: 134 MG/L (ref 0–8.2)
DIFFERENTIAL METHOD BLD: ABNORMAL
EOSINOPHIL # BLD AUTO: 0.1 K/UL (ref 0–0.5)
EOSINOPHIL NFR BLD: 0.8 % (ref 0–8)
ERYTHROCYTE [DISTWIDTH] IN BLOOD BY AUTOMATED COUNT: 14.7 % (ref 11.5–14.5)
ERYTHROCYTE [SEDIMENTATION RATE] IN BLOOD BY PHOTOMETRIC METHOD: 113 MM/HR (ref 0–36)
EST. GFR  (NO RACE VARIABLE): 54.5 ML/MIN/1.73 M^2
GLUCOSE SERPL-MCNC: 134 MG/DL (ref 70–110)
GLUCOSE UR QL STRIP: NEGATIVE
HCT VFR BLD AUTO: 27.3 % (ref 37–48.5)
HGB BLD-MCNC: 8.5 G/DL (ref 12–16)
HGB UR QL STRIP: NEGATIVE
IMM GRANULOCYTES # BLD AUTO: 0.06 K/UL (ref 0–0.04)
IMM GRANULOCYTES NFR BLD AUTO: 0.5 % (ref 0–0.5)
KETONES UR QL STRIP: NEGATIVE
LEUKOCYTE ESTERASE UR QL STRIP: ABNORMAL
LIPASE SERPL-CCNC: <4 U/L (ref 4–60)
LYMPHOCYTES # BLD AUTO: 1.4 K/UL (ref 1–4.8)
LYMPHOCYTES NFR BLD: 11.9 % (ref 18–48)
MAGNESIUM SERPL-MCNC: 1.5 MG/DL (ref 1.6–2.6)
MCH RBC QN AUTO: 31.8 PG (ref 27–31)
MCHC RBC AUTO-ENTMCNC: 31.1 G/DL (ref 32–36)
MCV RBC AUTO: 102 FL (ref 82–98)
MICROSCOPIC COMMENT: NORMAL
MONOCYTES # BLD AUTO: 1.7 K/UL (ref 0.3–1)
MONOCYTES NFR BLD: 13.8 % (ref 4–15)
NEUTROPHILS # BLD AUTO: 8.7 K/UL (ref 1.8–7.7)
NEUTROPHILS NFR BLD: 72.6 % (ref 38–73)
NITRITE UR QL STRIP: NEGATIVE
NRBC BLD-RTO: 0 /100 WBC
PH UR STRIP: 6 [PH] (ref 5–8)
PLATELET # BLD AUTO: 257 K/UL (ref 150–450)
PMV BLD AUTO: 12.3 FL (ref 9.2–12.9)
POTASSIUM SERPL-SCNC: 3.5 MMOL/L (ref 3.5–5.1)
PROT SERPL-MCNC: 6 G/DL (ref 6–8.4)
PROT UR QL STRIP: ABNORMAL
RBC # BLD AUTO: 2.67 M/UL (ref 4–5.4)
RBC #/AREA URNS AUTO: 2 /HPF (ref 0–4)
SODIUM SERPL-SCNC: 140 MMOL/L (ref 136–145)
SP GR UR STRIP: 1.03 (ref 1–1.03)
SQUAMOUS #/AREA URNS AUTO: 0 /HPF
URN SPEC COLLECT METH UR: ABNORMAL
WBC # BLD AUTO: 12.01 K/UL (ref 3.9–12.7)
WBC #/AREA URNS AUTO: 4 /HPF (ref 0–5)

## 2024-04-29 PROCEDURE — 25000003 PHARM REV CODE 250: Performed by: STUDENT IN AN ORGANIZED HEALTH CARE EDUCATION/TRAINING PROGRAM

## 2024-04-29 PROCEDURE — 63600175 PHARM REV CODE 636 W HCPCS: Performed by: STUDENT IN AN ORGANIZED HEALTH CARE EDUCATION/TRAINING PROGRAM

## 2024-04-29 PROCEDURE — 81001 URINALYSIS AUTO W/SCOPE: CPT | Performed by: STUDENT IN AN ORGANIZED HEALTH CARE EDUCATION/TRAINING PROGRAM

## 2024-04-29 PROCEDURE — 99285 EMERGENCY DEPT VISIT HI MDM: CPT | Mod: 25

## 2024-04-29 PROCEDURE — 83735 ASSAY OF MAGNESIUM: CPT | Performed by: STUDENT IN AN ORGANIZED HEALTH CARE EDUCATION/TRAINING PROGRAM

## 2024-04-29 PROCEDURE — 83690 ASSAY OF LIPASE: CPT | Performed by: STUDENT IN AN ORGANIZED HEALTH CARE EDUCATION/TRAINING PROGRAM

## 2024-04-29 PROCEDURE — 80053 COMPREHEN METABOLIC PANEL: CPT | Performed by: STUDENT IN AN ORGANIZED HEALTH CARE EDUCATION/TRAINING PROGRAM

## 2024-04-29 PROCEDURE — 25500020 PHARM REV CODE 255: Performed by: STUDENT IN AN ORGANIZED HEALTH CARE EDUCATION/TRAINING PROGRAM

## 2024-04-29 PROCEDURE — 85652 RBC SED RATE AUTOMATED: CPT | Performed by: STUDENT IN AN ORGANIZED HEALTH CARE EDUCATION/TRAINING PROGRAM

## 2024-04-29 PROCEDURE — 85025 COMPLETE CBC W/AUTO DIFF WBC: CPT | Performed by: STUDENT IN AN ORGANIZED HEALTH CARE EDUCATION/TRAINING PROGRAM

## 2024-04-29 PROCEDURE — 96375 TX/PRO/DX INJ NEW DRUG ADDON: CPT

## 2024-04-29 PROCEDURE — 96365 THER/PROPH/DIAG IV INF INIT: CPT

## 2024-04-29 PROCEDURE — 86140 C-REACTIVE PROTEIN: CPT | Performed by: STUDENT IN AN ORGANIZED HEALTH CARE EDUCATION/TRAINING PROGRAM

## 2024-04-29 PROCEDURE — 82550 ASSAY OF CK (CPK): CPT | Performed by: STUDENT IN AN ORGANIZED HEALTH CARE EDUCATION/TRAINING PROGRAM

## 2024-04-29 PROCEDURE — 21400001 HC TELEMETRY ROOM

## 2024-04-29 PROCEDURE — 96366 THER/PROPH/DIAG IV INF ADDON: CPT

## 2024-04-29 RX ORDER — INSULIN ASPART 100 [IU]/ML
0-10 INJECTION, SOLUTION INTRAVENOUS; SUBCUTANEOUS
Status: DISCONTINUED | OUTPATIENT
Start: 2024-04-29 | End: 2024-05-02 | Stop reason: HOSPADM

## 2024-04-29 RX ORDER — ATORVASTATIN CALCIUM 20 MG/1
20 TABLET, FILM COATED ORAL NIGHTLY
Status: DISCONTINUED | OUTPATIENT
Start: 2024-04-29 | End: 2024-05-02 | Stop reason: HOSPADM

## 2024-04-29 RX ORDER — BISACODYL 10 MG/1
10 SUPPOSITORY RECTAL DAILY PRN
Status: DISCONTINUED | OUTPATIENT
Start: 2024-04-29 | End: 2024-05-02 | Stop reason: HOSPADM

## 2024-04-29 RX ORDER — CEFAZOLIN SODIUM 1 G/3ML
1 INJECTION, POWDER, FOR SOLUTION INTRAMUSCULAR; INTRAVENOUS
Status: COMPLETED | OUTPATIENT
Start: 2024-04-29 | End: 2024-04-29

## 2024-04-29 RX ORDER — IBUPROFEN 200 MG
24 TABLET ORAL
Status: DISCONTINUED | OUTPATIENT
Start: 2024-04-29 | End: 2024-05-02 | Stop reason: HOSPADM

## 2024-04-29 RX ORDER — CALCIUM CARBONATE 200(500)MG
500 TABLET,CHEWABLE ORAL 3 TIMES DAILY PRN
Status: DISCONTINUED | OUTPATIENT
Start: 2024-04-29 | End: 2024-05-02 | Stop reason: HOSPADM

## 2024-04-29 RX ORDER — ASCORBIC ACID 500 MG
500 TABLET ORAL DAILY
Status: DISCONTINUED | OUTPATIENT
Start: 2024-04-30 | End: 2024-05-02 | Stop reason: HOSPADM

## 2024-04-29 RX ORDER — GLUCAGON 1 MG
1 KIT INJECTION
Status: DISCONTINUED | OUTPATIENT
Start: 2024-04-29 | End: 2024-05-02 | Stop reason: HOSPADM

## 2024-04-29 RX ORDER — PROCHLORPERAZINE EDISYLATE 5 MG/ML
5 INJECTION INTRAMUSCULAR; INTRAVENOUS EVERY 6 HOURS PRN
Status: DISCONTINUED | OUTPATIENT
Start: 2024-04-29 | End: 2024-05-02 | Stop reason: HOSPADM

## 2024-04-29 RX ORDER — ONDANSETRON 4 MG/1
8 TABLET, ORALLY DISINTEGRATING ORAL EVERY 8 HOURS PRN
Status: DISCONTINUED | OUTPATIENT
Start: 2024-04-29 | End: 2024-05-02 | Stop reason: HOSPADM

## 2024-04-29 RX ORDER — CETIRIZINE HYDROCHLORIDE 5 MG/1
5 TABLET ORAL NIGHTLY
Status: DISCONTINUED | OUTPATIENT
Start: 2024-04-29 | End: 2024-05-02 | Stop reason: HOSPADM

## 2024-04-29 RX ORDER — AMOXICILLIN 250 MG
1 CAPSULE ORAL 2 TIMES DAILY
Status: DISCONTINUED | OUTPATIENT
Start: 2024-04-29 | End: 2024-05-02 | Stop reason: HOSPADM

## 2024-04-29 RX ORDER — POLYETHYLENE GLYCOL 3350 17 G/17G
17 POWDER, FOR SOLUTION ORAL DAILY PRN
Status: DISCONTINUED | OUTPATIENT
Start: 2024-04-29 | End: 2024-05-02 | Stop reason: HOSPADM

## 2024-04-29 RX ORDER — SODIUM CHLORIDE 0.9 % (FLUSH) 0.9 %
10 SYRINGE (ML) INJECTION EVERY 12 HOURS PRN
Status: DISCONTINUED | OUTPATIENT
Start: 2024-04-29 | End: 2024-05-02 | Stop reason: HOSPADM

## 2024-04-29 RX ORDER — TALC
6 POWDER (GRAM) TOPICAL NIGHTLY PRN
Status: DISCONTINUED | OUTPATIENT
Start: 2024-04-29 | End: 2024-04-29

## 2024-04-29 RX ORDER — ENOXAPARIN SODIUM 100 MG/ML
30 INJECTION SUBCUTANEOUS EVERY 24 HOURS
Status: DISCONTINUED | OUTPATIENT
Start: 2024-04-29 | End: 2024-05-02 | Stop reason: HOSPADM

## 2024-04-29 RX ORDER — ALBUTEROL SULFATE 2.5 MG/.5ML
2.5 SOLUTION RESPIRATORY (INHALATION) EVERY 4 HOURS PRN
Status: DISCONTINUED | OUTPATIENT
Start: 2024-04-29 | End: 2024-05-02 | Stop reason: HOSPADM

## 2024-04-29 RX ORDER — TALC
6 POWDER (GRAM) TOPICAL NIGHTLY PRN
Status: DISCONTINUED | OUTPATIENT
Start: 2024-04-29 | End: 2024-05-02 | Stop reason: HOSPADM

## 2024-04-29 RX ORDER — IBUPROFEN 200 MG
16 TABLET ORAL
Status: DISCONTINUED | OUTPATIENT
Start: 2024-04-29 | End: 2024-05-02 | Stop reason: HOSPADM

## 2024-04-29 RX ORDER — ACETAMINOPHEN 325 MG/1
650 TABLET ORAL
Status: COMPLETED | OUTPATIENT
Start: 2024-04-29 | End: 2024-04-29

## 2024-04-29 RX ORDER — METHOCARBAMOL 500 MG/1
500 TABLET, FILM COATED ORAL EVERY 6 HOURS
Status: DISCONTINUED | OUTPATIENT
Start: 2024-04-29 | End: 2024-05-02 | Stop reason: HOSPADM

## 2024-04-29 RX ORDER — MAGNESIUM SULFATE HEPTAHYDRATE 40 MG/ML
2 INJECTION, SOLUTION INTRAVENOUS
Status: COMPLETED | OUTPATIENT
Start: 2024-04-29 | End: 2024-04-29

## 2024-04-29 RX ORDER — HYDRALAZINE HYDROCHLORIDE 25 MG/1
25 TABLET, FILM COATED ORAL EVERY 8 HOURS PRN
Status: DISCONTINUED | OUTPATIENT
Start: 2024-04-29 | End: 2024-05-02 | Stop reason: HOSPADM

## 2024-04-29 RX ORDER — SODIUM CHLORIDE 0.9 % (FLUSH) 0.9 %
10 SYRINGE (ML) INJECTION
Status: DISCONTINUED | OUTPATIENT
Start: 2024-04-29 | End: 2024-05-02 | Stop reason: HOSPADM

## 2024-04-29 RX ORDER — NALOXONE HCL 0.4 MG/ML
0.02 VIAL (ML) INJECTION
Status: DISCONTINUED | OUTPATIENT
Start: 2024-04-29 | End: 2024-05-02 | Stop reason: HOSPADM

## 2024-04-29 RX ORDER — MONTELUKAST SODIUM 10 MG/1
10 TABLET ORAL NIGHTLY
Status: DISCONTINUED | OUTPATIENT
Start: 2024-04-29 | End: 2024-05-02 | Stop reason: HOSPADM

## 2024-04-29 RX ORDER — LOSARTAN POTASSIUM 50 MG/1
50 TABLET ORAL DAILY
Status: DISCONTINUED | OUTPATIENT
Start: 2024-04-30 | End: 2024-05-02 | Stop reason: HOSPADM

## 2024-04-29 RX ORDER — CEFAZOLIN SODIUM 1 G/3ML
1 INJECTION, POWDER, FOR SOLUTION INTRAMUSCULAR; INTRAVENOUS
Status: DISCONTINUED | OUTPATIENT
Start: 2024-04-29 | End: 2024-04-29

## 2024-04-29 RX ORDER — ACETAMINOPHEN 325 MG/1
650 TABLET ORAL EVERY 8 HOURS PRN
Status: DISCONTINUED | OUTPATIENT
Start: 2024-04-29 | End: 2024-05-02 | Stop reason: HOSPADM

## 2024-04-29 RX ORDER — PREGABALIN 25 MG/1
25 CAPSULE ORAL 2 TIMES DAILY
Status: DISCONTINUED | OUTPATIENT
Start: 2024-04-29 | End: 2024-05-02 | Stop reason: HOSPADM

## 2024-04-29 RX ADMIN — INSULIN ASPART 1 UNITS: 100 INJECTION, SOLUTION INTRAVENOUS; SUBCUTANEOUS at 11:04

## 2024-04-29 RX ADMIN — INSULIN DETEMIR 4 UNITS: 100 INJECTION, SOLUTION SUBCUTANEOUS at 11:04

## 2024-04-29 RX ADMIN — METHOCARBAMOL 500 MG: 500 TABLET ORAL at 11:04

## 2024-04-29 RX ADMIN — DOCUSATE SODIUM AND SENNOSIDES 1 TABLET: 8.6; 5 TABLET, FILM COATED ORAL at 11:04

## 2024-04-29 RX ADMIN — ATORVASTATIN CALCIUM 20 MG: 20 TABLET, FILM COATED ORAL at 11:04

## 2024-04-29 RX ADMIN — MONTELUKAST 10 MG: 10 TABLET, FILM COATED ORAL at 11:04

## 2024-04-29 RX ADMIN — PREGABALIN 25 MG: 25 CAPSULE ORAL at 11:04

## 2024-04-29 RX ADMIN — METHOCARBAMOL 500 MG: 500 TABLET ORAL at 07:04

## 2024-04-29 RX ADMIN — MAGNESIUM SULFATE HEPTAHYDRATE 2 G: 40 INJECTION, SOLUTION INTRAVENOUS at 02:04

## 2024-04-29 RX ADMIN — ACETAMINOPHEN 650 MG: 325 TABLET ORAL at 03:04

## 2024-04-29 RX ADMIN — CEFAZOLIN 1 G: 330 INJECTION, POWDER, FOR SOLUTION INTRAMUSCULAR; INTRAVENOUS at 01:04

## 2024-04-29 RX ADMIN — IOHEXOL 75 ML: 350 INJECTION, SOLUTION INTRAVENOUS at 01:04

## 2024-04-29 RX ADMIN — CETIRIZINE HYDROCHLORIDE 5 MG: 5 TABLET, FILM COATED ORAL at 11:04

## 2024-04-29 RX ADMIN — ENOXAPARIN SODIUM 30 MG: 30 INJECTION SUBCUTANEOUS at 07:04

## 2024-04-29 RX ADMIN — ACETAMINOPHEN 650 MG: 325 TABLET ORAL at 11:04

## 2024-04-29 NOTE — ED NOTES
..Patient identifiers for Sussy Costa 88 y.o. female checked and correct.  Chief Complaint   Patient presents with    Hip Pain     R hip and L wrist pain denies injury, complete assist out of car     Past Medical History:   Diagnosis Date    Asthma     Cyst of pancreas     liver    Diabetes mellitus type II     Eczema of hand     Glaucoma     History of colon cancer 10/28/2022    Right sided colon cancer diagnosed in setting of LBO requiring urgent hemicolectomy 10/25/2022 with path showing [id number] disease. CT chest 12/7/22 with possible lung metastases and MR liver with indeterminate liver lesions. 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 Patient elected for surveillance.       History of recurrent pneumonia 09/28/2020    Hyperlipidemia     Hypertension     Lichen planus     gums    Osteoporosis     Other chronic pancreatitis 03/01/2023    Pancreas cyst 03/18/2016    Pulmonary nodules     Spinal stenosis of lumbar region 08/30/2018     Allergies reported:   Review of patient's allergies indicates:   Allergen Reactions    Aspirin Other (See Comments)     Asthma    TRIAD OF NASAL POLYPS, ASA  ALLERGY AND ASTHMA.        Aspirin Other (See Comments)    Nsaids (non-steroidal anti-inflammatory drug) Other (See Comments)     AVOID DUE TO TRIAD OF ASA ALLERGY, NASAL POLYPS,AND ASTHMA  AVOID DUE TO TRIAD OF ASA ALLERGY, NASAL POLYPS,AND ASTHMA    Penicillin      Other reaction(s): Rash    9/30/20: tolerates ceftriaxone    Penicillin g Other (See Comments)     Other reaction(s): Rash    9/30/20: tolerates ceftriaxone         LOC: Patient is awake, alert, and aware of environment with an appropriate affect. Patient is oriented x 3 and speaking appropriately.  APPEARANCE: Patient resting comfortably and in no acute distress. Patient is clean and well groomed, patient's clothing is properly fastened.  HEENT: **AAO --Yesterday , started  with pain below left breast( mid abdomen) radiating across abdomen (left to right side) radiating to back .   SKIN: The skin is warm and dry. Patient has normal skin turgor and moist mucus membranes. Skin is intact; no bruising or breakdown noted.  MUSKULOSKELETAL: Patient is moving all extremities well, no obvious deformities noted. Pulses intact. Redness to left anterior calf. Walks with walker--1+ edema noted to left ankle   RESPIRATORY: Airway is open and patent. Respirations are spontaneous and non-labored with normal effort and rate, BBS=clear  CARDIAC: Patient has a normal rate and rhythm.  NSR on cardiac monitor,No peripheral edema noted.   ABDOMEN: No distention noted. Bowel sounds active in all 4 quadrants. Soft and non-tender upon palpation.  NEUROLOGICAL: Facial expression is symmetrical. Hand grasps are equal bilaterally. Normal sensation in all extremities when touched with finger.

## 2024-04-29 NOTE — PROGRESS NOTES
Pharmacist Renal Dose Adjustment Note    Sussy Costa is a 88 y.o. female being treated with the medication enoxaparin    Patient Data:    Vital Signs (Most Recent):  Temp: 97.9 °F (36.6 °C) (04/29/24 1546)  Pulse: 95 (04/29/24 1546)  Resp: 20 (04/29/24 1546)  BP: (!) 156/73 (04/29/24 1546)  SpO2: 99 % (04/29/24 1546) Vital Signs (72h Range):  Temp:  [97.9 °F (36.6 °C)-98.4 °F (36.9 °C)]   Pulse:  []   Resp:  [20-24]   BP: (134-167)/(65-74)   SpO2:  [97 %-99 %]      Recent Labs   Lab 04/29/24  1236   CREATININE 1.0     Serum creatinine: 1 mg/dL 04/29/24 1236  Estimated creatinine clearance: 31.8 mL/min    Enoxaparin dose will be changed to 30 mg SQ daily   Tyra Hopkins, Pharm.D.  PGY-1 Pharmacy Resident  [id number]

## 2024-04-29 NOTE — ED PROVIDER NOTES
Source of History  patient, family, and EMR    Chief Complaint    Hip Pain (R hip and L wrist pain denies injury, complete assist out of car)      History of Present Illness    Sussy Costa is a 88 y.o. female presenting with concern for acute right hip pain.  Patient states onset this morning upon awakening, was unable to ambulate.  No trauma no falls no direct strike to the hip.  Recent L intertrochanteric fracture status post IM nail early March.  Has been in PT. notes no issues with PT, in fact she was largely improving.  No fever vomiting diarrhea or other GI symptoms however yesterday she noted that she had left mid to lower abdominal pain that radiated over towards the right, this spontaneously resolved as she rested, no specific intervention.  No urinary symptoms.  The right hip pain is mostly at the right proximal femur. R hip joint hurts with movement at knee.  She was on Eliquis following her intramedullary nail, and she has 1 more day of this.    Review of Systems    As per HPI and below:  Constitutional symptoms:  General weakness but otherwise feels like she can not bear weight on the right leg  Skin symptoms:  Erythema to left lower extremity, no bruising, no hematoma    Cardiovascular symptoms:  No chest pain, bilateral lower extremity swelling left-greater-than-right, no syncope  Musculoskeletal symptoms:  No back pain, right hip/proximal thigh discomfort  Neurologic symptoms:  No headache, no numbness, no tingling  Hematologic symptoms:  No bleeding disorder currently on anticoagulation  Psychiatric symptoms:  No substance abuse      Past History    As per HPI and below:  Past Medical History:   Diagnosis Date    Asthma     Cyst of pancreas     liver    Diabetes mellitus type II     Eczema of hand     Glaucoma     History of colon cancer 10/28/2022    Right sided colon cancer diagnosed in setting of LBO requiring urgent hemicolectomy 10/25/2022 with path showing [id number] disease. CT chest 12/7/22  with possible lung metastases and MR liver with indeterminate liver lesions. 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 Patient elected for surveillance.       History of recurrent pneumonia 09/28/2020    Hyperlipidemia     Hypertension     Lichen planus     gums    Osteoporosis     Other chronic pancreatitis 03/01/2023    Pancreas cyst 03/18/2016    Pulmonary nodules     Spinal stenosis of lumbar region 08/30/2018       Past Surgical History:   Procedure Laterality Date    BRONCHOSCOPY N/A 5/13/2022    Procedure: Bronchoscopy;  Surgeon: [facility] Diagnostic Provider;  Location: [facility] OR [address] [place] [address];  Service: Anesthesiology;  Laterality: N/A;    CATARACT EXTRACTION, BILATERAL      CHOLECYSTECTOMY      COLECTOMY, RIGHT Right 10/25/2022    Procedure: COLECTOMY, RIGHT;  Surgeon: Jass Holman MD;  [facility];  Service: Colon and Rectal;  Laterality: Right;    COLONOSCOPY N/A 6/26/2023    Procedure: COLONOSCOPY;  Surgeon: Jass Holman MD;  Location: [facility] ([facility]);  Service: Endoscopy;  Laterality: N/A;  2nd floor -lung disease  referral Dr [id number] portal/mailed-GT  6/20/23- [place] confirmed- [place]    ENDOSCOPIC ULTRASOUND OF UPPER GASTROINTESTINAL TRACT N/A 4/22/2022    Procedure: ULTRASOUND, UPPER GI TRACT, ENDOSCOPIC;  Surgeon: Max Rosado MD;  Location: [facility] ([address] [place] [address]);  Service: Endoscopy;  Laterality: N/A;  urgent EUS (linear) for abnormal CT scan with dilated PD and increased cyst of pancreas cyst.  pap 44 mmHg  4/13:fully vaccinated. instructions via portal-SC    EPIDURAL STEROID INJECTION INTO LUMBAR SPINE N/A 11/8/2018    Procedure: Injection-steroid-epidural-lumbar L5-S1;  Surgeon: Nicci Osorio Jr., MD;  Location: [facility] PAIN MGT;  Service: Pain Management;  Laterality: N/A;    FUNCTIONAL ENDOSCOPIC SINUS SURGERY (FESS) USING COMPUTER-ASSISTED NAVIGATION N/A 6/17/2019     Procedure: FESS, USING COMPUTER-ASSISTED NAVIGATION;  Surgeon: Rosangela Isabel MD;  Location: [facility] OR;  Service: ENT;  Laterality: N/A;  video    HYSTERECTOMY      INTRAMEDULLARY RODDING OF FEMUR Left 3/31/2024    Procedure: INSERTION, INTRAMEDULLARY CAYDEN, FEMUR - Left,;  Surgeon: Nakul Walker MD;  Location: [facility] OR [address] [place] [address];  Service: Orthopedics;  Laterality: Left;    nasal polyps         Social History     Socioeconomic History    Marital status:      Spouse name: chuck    Number of children: 1   Occupational History    Occupation:    Tobacco Use    Smoking status: Never     Passive exposure: Never    Smokeless tobacco: Never   Substance and Sexual Activity    Alcohol use: Yes     Comment: socially    Drug use: No    Sexual activity: Yes     Partners: Male     Birth control/protection: Post-menopausal   Social History Narrative    Lives in [place] with  Chuck. Daughter passed away in 2017 with leukemia. Two adult grandchildren. Former .     Social Determinants of Health     Financial Resource Strain: Low Risk  (3/31/2024)    Overall Financial Resource Strain (CARDIA)     Difficulty of Paying Living Expenses: Not hard at all   Food Insecurity: No Food Insecurity (3/31/2024)    Hunger Vital Sign     Worried About Running Out of Food in the Last Year: Never true     Ran Out of Food in the Last Year: Never true   Transportation Needs: No Transportation Needs (3/31/2024)    PRAPARE - Transportation     Lack of Transportation (Medical): No     Lack of Transportation (Non-Medical): No   Physical Activity: Sufficiently Active (3/31/2024)    Exercise Vital Sign     Days of Exercise per Week: 3 days     Minutes of Exercise per Session: 60 min   Stress: No Stress Concern Present (3/31/2024)    [employer] [place] [employer] - Occupational Stress Questionnaire     Feeling of Stress : Not at all   Social Connections: Socially Integrated (3/31/2024)    Social Connection  and Isolation Panel [NHANES]     Frequency of Communication with Friends and Family: More than three times a week     Frequency of Social Gatherings with Friends and Family: More than three times a week     Attends Confucianist Services: More than 4 times per year     Active Member of Clubs or Organizations: Yes     Attends Club or Organization Meetings: 1 to 4 times per year     Marital Status:    Housing Stability: Low Risk  (3/31/2024)    Housing Stability Vital Sign     Unable to Pay for Housing in the Last Year: No     Number of Places Lived in the Last Year: 1     Unstable Housing in the Last Year: No       Family History   Problem Relation Name Age of Onset    Heart disease Father      Heart disease Brother      Hypertension Brother         Review of patient's allergies indicates:   Allergen Reactions    Aspirin Other (See Comments)     Asthma    TRIAD OF NASAL POLYPS, ASA  ALLERGY AND ASTHMA.        Aspirin Other (See Comments)    Nsaids (non-steroidal anti-inflammatory drug) Other (See Comments)     AVOID DUE TO TRIAD OF ASA ALLERGY, NASAL POLYPS,AND ASTHMA  AVOID DUE TO TRIAD OF ASA ALLERGY, NASAL POLYPS,AND ASTHMA    Penicillin      Other reaction(s): Rash    9/30/20: tolerates ceftriaxone    Penicillin g Other (See Comments)     Other reaction(s): Rash    9/30/20: tolerates ceftriaxone       No current facility-administered medications on file prior to encounter.     Current Outpatient Medications on File Prior to Encounter   Medication Sig Dispense Refill    acetaminophen (TYLENOL) 500 MG tablet Take 2 tablets (1,000 mg total) by mouth every 8 (eight) hours.  0    albuterol (PROVENTIL) 2.5 mg /3 mL (0.083 %) nebulizer solution Take 3 mLs (2.5 mg total) by nebulization every 6 (six) hours as needed for Wheezing or Shortness of Breath. Rescue 60 each 0    apixaban (ELIQUIS) 2.5 mg Tab Take 1 tablet (2.5 mg total) by mouth 2 (two) times daily. End date 4/29/24      ascorbic acid, vitamin C, (VITAMIN C)  500 MG tablet Take 500 mg by mouth once daily.      atorvastatin (LIPITOR) 20 MG tablet Take 1 tablet (20 mg total) by mouth every evening. 90 tablet 1    calcium carbonate (TUMS) 200 mg calcium (500 mg) chewable tablet Take 1 tablet (500 mg total) by mouth 3 (three) times daily as needed for Heartburn.      cetirizine (ZYRTEC) 5 MG tablet Take 1 tablet (5 mg total) by mouth every evening.  0    chlorthalidone (HYGROTEN) 25 MG Tab Take 1 tablet (25 mg total) by mouth once daily. (Patient not taking: Reported on 4/19/2024) 90 tablet 3    denosumab (PROLIA) 60 mg/mL Syrg Inject 1 mL (60 mg total) into the skin every 6 (six) months. Hold at rehab      fluticasone furoate-vilanteroL (BREO) 200-25 mcg/dose DsDv diskus inhaler Inhale 1 puff into the lungs once daily. If home trillegy inhaler is not on formulary use this instead at rehab. If trillegy is on formulary at rehab then stop this at rehab (Patient not taking: Reported on 4/19/2024)      fluticasone-umeclidin-vilanter (TRELEGY ELLIPTA) 200-62.5-25 mcg inhaler Inhale 1 puff into the lungs once daily. Hold at rehah if not on formulary 3 each 4    insulin aspart U-100 (NOVOLOG FLEXPEN U-100 INSULIN) 100 unit/mL (3 mL) InPn pen Inject 5 Units into the skin 3 (three) times daily.  0    insulin aspart U-100 (NOVOLOG) 100 unit/mL (3 mL) InPn pen Inject 0-5 Units into the skin before meals and at bedtime as needed (Hyperglycemia).  0    insulin detemir U-100, Levemir, 100 unit/mL (3 mL) SubQ InPn pen Inject 4 Units into the skin 2 (two) times daily.  0    irbesartan (AVAPRO) 300 MG tablet Take 1 tablet (300 mg total) by mouth every evening. 90 tablet 3    latanoprost 0.005 % ophthalmic solution Inject into the eye. 1 Drops Ophthalmic Every evening      lipase-protease-amylase 24,000-76,000-120,000 units (CREON) 24,000-76,000 -120,000 unit capsule Take 2 capsules with meals orally and 1 capsule with snacks. 300 capsule 11    losartan (COZAAR) 50 MG tablet Take 50 mg by  "mouth.      magnesium oxide (MAG-OX) 400 mg (241.3 mg magnesium) tablet Take 400 mg by mouth once daily.      melatonin (MELATIN) 3 mg tablet Take 2 tablets (6 mg total) by mouth nightly as needed for Insomnia.  0    methocarbamoL (ROBAXIN) 500 MG Tab Take 1 tablet (500 mg total) by mouth every 6 (six) hours. 40 tablet 0    montelukast (SINGULAIR) 10 mg tablet Take 1 tablet (10 mg total) by mouth every evening. 30 tablet 6    olopatadine (PATANOL) 0.1 % ophthalmic solution Place 1 drop into both eyes 2 (two) times daily as needed for Allergies. 1 Drops Ophthalmic Twice a day      ondansetron (ZOFRAN-ODT) 8 MG TbDL Take 1 tablet (8 mg total) by mouth every 6 (six) hours as needed.      polyethylene glycol (GLYCOLAX) 17 gram PwPk Take 17 g by mouth once daily. (Patient not taking: Reported on 4/19/2024)  0    pregabalin (LYRICA) 25 MG capsule Take 1 capsule (25 mg total) by mouth 2 (two) times daily. 60 capsule 0    senna-docusate 8.6-50 mg (PERICOLACE) 8.6-50 mg per tablet Take 1 tablet by mouth 2 (two) times daily. (Patient not taking: Reported on 4/19/2024)      vitamin D (VITAMIN D3) 1000 units Tab Take 1,000 Units by mouth once daily.      [DISCONTINUED] risedronate (ACTONEL) 35 MG tablet Take 1 tablet (35 mg total) by mouth every 7 days. 12 tablet 3       Physical Exam    Reviewed nursing notes.  Vitals:    04/29/24 1021 04/29/24 1401 04/29/24 1546   BP: 134/65 (!) 167/74 (!) 156/73   Pulse: 102 98 95   Resp: 20 (!) 24 20   Temp: 98.4 °F (36.9 °C)  97.9 °F (36.6 °C)   TempSrc: Oral  Oral   SpO2: 98% 97% 99%   Weight: 50.8 kg (112 lb)     Height: 5' 4" (1.626 m)       General:  Alert, no acute distress.    Skin:  Warm, dry, intact.  Erythema to the left lower extremity just above the ankle  Head:  Normocephalic, atraumatic.    Neck:  Supple.   HEENT:  Pupils are equal and round, appropriate for room, extraocular movements are intact.  Normal phonation.  Moist mucous membranes.  Cardiovascular:  Regular rate and " rhythm, Normal peripheral perfusion, left greater than right lower extremity pitting edema  Respiratory:  Lungs are clear to auscultation, respirations are non-labored, breath sounds are equal.    Gastrointestinal:  Soft, Nontender, Non distended.   Back:  Nontender.  Unable to assess ambulatory status as she was unable to bear weight  Musculoskeletal:  Normal range of motion observed, although she notes tenderness and discomfort at the right proximal femur with some swelling without overt ecchymosis or contusion over the right proximal femur in the lateral aspect.  Erythema and swelling noted to the left lower extremity distal 1/3 consistent with cellulitis  Neurological:  Alert and oriented to person, place, time, and situation.  No focal deficits observed.   Psychiatric:  Cooperative, appropriate mood & affect.         Initial MDM and Data Review    88 y.o. female presenting for evaluation of right leg discomfort, abdominal pain that has now resolved, currently on coagulation    Differential includes but is not limited to:  Fracture, contusion, hematoma, doubt dislocation.  Intra-abdominal pathology can include new mass or malignancy, although p.o. although this seems less likely given symptoms have resolved at this point, diverticulitis considered.  Unclear if patient's reported two points of pain are related.  DVT the patient was anticoagulated, making this less likely.  Septic arthritis seems less likely.  Cellulitis of the left lower extremity if imaging unremarkable otherwise, infectious versus inflammatory.    Work-up includes:  CT abdomen pelvis, thigh/right femur x-ray, bilateral Dopplers of the lower extremities, CBC, CMP, magnesium, CK    Interventions include:  Analgesia if needed    The patient has significant medical comorbidities that influence decision making for this acute process, such as:  Hypertension, hyperlipidemia, pancreatic insufficiency currently on Creon, CKD history of colon cancer  status post hemicolectomy, diabetes    I decided to obtain the patient's medical records and review relevant documentation from hospital records and clinic records.  Pertinent information is noted.  Reviewed recent hospital stay for intertrochanteric femur fracture following a fall    Medications   magnesium sulfate 2g in water 50mL IVPB (premix) (has no administration in time range)   iohexoL (OMNIPAQUE 350) injection 75 mL (75 mLs Intravenous Given 4/29/24 1306)   ceFAZolin injection 1 g (1 g Intravenous Given 4/29/24 1359)   acetaminophen tablet 650 mg (650 mg Oral Given 4/29/24 6672)       Results and ED Course    Labs Reviewed   CBC W/ AUTO DIFFERENTIAL - Abnormal; Notable for the following components:       Result Value    RBC 2.67 (*)     Hemoglobin 8.5 (*)     Hematocrit 27.3 (*)      (*)     MCH 31.8 (*)     MCHC 31.1 (*)     RDW 14.7 (*)     Gran # (ANC) 8.7 (*)     Immature Grans (Abs) 0.06 (*)     Mono # 1.7 (*)     Lymph % 11.9 (*)     All other components within normal limits   COMPREHENSIVE METABOLIC PANEL - Abnormal; Notable for the following components:    Chloride 112 (*)     CO2 19 (*)     Glucose 134 (*)     Calcium 8.6 (*)     Albumin 2.7 (*)     ALT 8 (*)     eGFR 54.5 (*)     All other components within normal limits   MAGNESIUM - Abnormal; Notable for the following components:    Magnesium 1.5 (*)     All other components within normal limits   CK   LIPASE   URINALYSIS, REFLEX TO URINE CULTURE   SEDIMENTATION RATE   C-REACTIVE PROTEIN       Imaging Results              US Lower Extremity Veins Bilateral (Final result)  Result time 04/29/24 15:25:13      Final result by Titus Sheth MD (04/29/24 15:25:13)                   Impression:      No evidence of deep venous thrombosis in either lower extremity.    Small bilateral Baker's cysts.    Electronically signed by resident: Virginia Melgar  Date:    04/29/2024  Time:    15:13    Electronically signed by: Titus Sheth  MD  Date:    04/29/2024  Time:    15:25               Narrative:    EXAMINATION:  US LOWER EXTREMITY VEINS BILATERAL    CLINICAL HISTORY:  Localized edema    TECHNIQUE:  Duplex and color flow Doppler and dynamic compression was performed of the bilateral lower extremity veins was performed.    COMPARISON:  Ultrasound lower extremity 04/18/2024    FINDINGS:  Right thigh veins: The common femoral, femoral, popliteal, upper greater saphenous, and deep femoral veins are patent and free of thrombus. The veins are normally compressible and have normal phasic flow and augmentation response.    Right calf veins: The visualized calf veins are patent.    Left thigh veins: The common femoral, femoral, popliteal, upper greater saphenous, and deep femoral veins are patent and free of thrombus. The veins are normally compressible and have normal phasic flow and augmentation response.    Left calf veins: The visualized calf veins are patent.    Miscellaneous: Bilateral Baker's cysts measuring 2.8 cm on the right and 4 cm on the left.  Bilateral subcutaneous edema.                                       X-Ray Hip 2 or 3 views Right (with Pelvis when performed) (Final result)  Result time 04/29/24 13:58:38      Final result by Johan Miles MD (04/29/24 13:58:38)                   Impression:      See above      Electronically signed by: Johan Miles MD  Date:    04/29/2024  Time:    13:58               Narrative:    EXAMINATION:  XR HIP WITH PELVIS WHEN PERFORMED, 2 OR 3  VIEWS RIGHT    CLINICAL HISTORY:  pain R hip recent intertroch fracture;    TECHNIQUE:  AP view of the pelvis and frog leg lateral view of the right hip were performed.    COMPARISON:  04/11/2024    FINDINGS:  Postoperative changes of internal fixation of the left hip fracture identified.  The position alignment is satisfactory and unchanged as compared to the previous study.    DJD involving the right hip with narrowing of the hip joint space.                                         CT Abdomen Pelvis With IV Contrast NO Oral Contrast (Final result)  Result time 04/29/24 13:47:55      Final result by Matteo Stallwroth MD (04/29/24 13:47:55)                   Impression:      This report was flagged in Epic as abnormal.    1. Large amount of stool throughout the colon suggests constipation, correlation is advised.  2. Findings suggest hepatic steatosis, correlation with LFTs recommended.  3. Stable configuration of the pancreas noting likely sequela of chronic pancreatitis and ductal dilation.  No interval inflammation about the pancreas.  Correlation with pancreatic enzymes as warranted.  4. The urinary bladder is distended, correlation with any history of outlet obstruction or urinary retention.  5. Stable pulmonary nodules, please see exam 11/18/2023.  6. Please see above for several additional findings.      Electronically signed by: Matteo Stallworth MD  Date:    04/29/2024  Time:    13:47               Narrative:    EXAMINATION:  CT ABDOMEN PELVIS WITH IV CONTRAST    CLINICAL HISTORY:  LLQ abdominal pain;    TECHNIQUE:  Low dose axial images, sagittal and coronal reformations were obtained from the lung bases to the pubic symphysis following the IV administration of 75 mL of Omnipaque 350 .  Oral contrast was not given.    COMPARISON:  11/18/2023    FINDINGS:  Images of the lower thorax are remarkable for bilateral dependent atelectasis/scarring.  There are several scattered pulmonary nodules particularly involving the right hemithorax, largest within the right lower lobe measuring 7-8 mm.  These nodules appear stable from examination 11/18/2023 noting prior consolidative process within the left lower lobe has resolved since that time.  There is a small right pleural effusion.    There is mild wall thickening of the distal esophagus, correlation with any history of reflux esophagitis.  The liver is hypoattenuating suggesting steatosis, correlation with LFTs recommended.   Low attenuating lesions arise from the right hepatic lobe, too small for characterization although attenuation may reflect that of cyst.  The adrenal glands are grossly unremarkable.  The gallbladder is surgically absent, no significant biliary dilation status post cholecystectomy.  There is a vague low attenuating lesion within the mid aspect of the spleen, subcentimeter, stable.  There is atrophic change of the pancreas noting diffuse parenchymal pancreatic calcification and dilation of the pancreatic duct, similar in configuration to the previous exam.  There are a few scattered cystic pancreatic lesions, stable.  There is a small hiatal hernia.  The stomach is decompressed without wall thickening.  The portal vein, splenic vein, SMV, celiac axis and SMA all are patent.  There are a few scattered abdominal lymph nodes.    The kidneys enhance symmetrically without hydronephrosis.  There is left nonobstructive nephrolithiasis.  Subcentimeter low attenuating lesions arise from the kidneys bilaterally, too small for characterization.  There is bilateral perinephric fat stranding.  The bilateral ureters are unable to be followed in their entirety to the urinary bladder, no definite calculi seen.  The urinary bladder is distended without wall thickening.  The uterus is absent the adnexa is grossly unremarkable.    There is a large amount of stool throughout the colon noting a few scattered colonic diverticula without inflammation.  There is surgical change of the cecum.  The small bowel is remarkable for a few distal fluid-filled loops without significant distention.  There is atherosclerotic calcification of the aorta and its branches.  There is surgical change in the pelvis.  There are multiple shotty periaortic, pericaval, and mesenteric lymph nodes.  There is strand-like fluid throughout the abdomen and pelvis.    There is osteopenia.  There are degenerative changes of the right femoroacetabular joint.  Surgical  changes are noted of the proximal left femur without femoroacetabular dislocation.  There are degenerative changes of the sacroiliac joints and spine.  There are regions of nonunion involving the prior left femoral fracture, continued follow-up advised.  There is body wall anasarca.                                      ED Course as of 04/29/24 1553   Mon Apr 29, 2024   1308 Hemoglobin(!): 8.5  Slightly lower than previous [AC]   1423 Magnesium (!): 1.5 [AC]   1423 CO2(!): 19 [AC]   1423 Glucose(!): 134 [AC]   1423 Impression:     This report was flagged in Epic as abnormal.     1. Large amount of stool throughout the colon suggests constipation, correlation is advised.  2. Findings suggest hepatic steatosis, correlation with LFTs recommended.  3. Stable configuration of the pancreas noting likely sequela of chronic pancreatitis and ductal dilation.  No interval inflammation about the pancreas.  Correlation with pancreatic enzymes as warranted.  4. The urinary bladder is distended, correlation with any history of outlet obstruction or urinary retention.  5. Stable pulmonary nodules, please see exam 11/18/2023.  6. Please see above for several additional findings.   [AC]   1424 X-Ray Hip 2 or 3 views Right (with Pelvis when performed)  Degenerative joint disease, no fracture or dislocation [AC]   1424 Awaiting bilateral lower extremity ultrasound Dopplers.  Otherwise, thus far, no source of patient's pain. [AC]   1528 US Lower Extremity Veins Bilateral  No DVT [AC]   1528 Admit for left lower extremity cellulitis patient status post Ancef, pelvis and right hip MRI given ambulatory dysfunction persistent right hip pain and unable to bear weight [AC]      ED Course User Index  [AC] Brad Conner, DO             Relevant imaging interpreted by myself  Reviewed films and CT.  Constipation, no obstruction.  No bony deformity or destruction, no obvious fracture.    Impression and Plan    88 y.o. female with findings of  inability to bear weight on right hip, as well as left lower extremity cellulitis based on the work up in the emergency department as above.    Important lab/imaging findings include:  Reviewed as above    I consulted with:  Orthopedics  Awaiting MRI for final recommendations    All tests, treatment options and disposition options were discussed with the patient.  The decision was made to admit the patient to the hospital.    The patient was admitted in stable condition and all further questions/concerns by patient and/or family were addressed.             Final diagnoses:  [R60.0] Bilateral leg edema  [L03.90] Cellulitis, unspecified cellulitis site (Primary)  [R26.2] Ambulatory dysfunction  [M25.551] Right hip pain        ED Disposition Condition    Admit Stable                  Brad Conner, DO  04/29/24 1554

## 2024-04-30 LAB
ALBUMIN SERPL BCP-MCNC: 2.1 G/DL (ref 3.5–5.2)
ALP SERPL-CCNC: 96 U/L (ref 55–135)
ALT SERPL W/O P-5'-P-CCNC: 6 U/L (ref 10–44)
ANION GAP SERPL CALC-SCNC: 7 MMOL/L (ref 8–16)
AST SERPL-CCNC: 11 U/L (ref 10–40)
BASOPHILS # BLD AUTO: 0.06 K/UL (ref 0–0.2)
BASOPHILS NFR BLD: 0.6 % (ref 0–1.9)
BILIRUB SERPL-MCNC: 0.4 MG/DL (ref 0.1–1)
BUN SERPL-MCNC: 22 MG/DL (ref 8–23)
CALCIUM SERPL-MCNC: 8.1 MG/DL (ref 8.7–10.5)
CHLORIDE SERPL-SCNC: 114 MMOL/L (ref 95–110)
CO2 SERPL-SCNC: 20 MMOL/L (ref 23–29)
CREAT SERPL-MCNC: 1 MG/DL (ref 0.5–1.4)
DIFFERENTIAL METHOD BLD: ABNORMAL
EOSINOPHIL # BLD AUTO: 0.7 K/UL (ref 0–0.5)
EOSINOPHIL NFR BLD: 6.7 % (ref 0–8)
ERYTHROCYTE [DISTWIDTH] IN BLOOD BY AUTOMATED COUNT: 14.8 % (ref 11.5–14.5)
EST. GFR  (NO RACE VARIABLE): 54.5 ML/MIN/1.73 M^2
GLUCOSE SERPL-MCNC: 105 MG/DL (ref 70–110)
HCT VFR BLD AUTO: 22.9 % (ref 37–48.5)
HGB BLD-MCNC: 7.1 G/DL (ref 12–16)
IMM GRANULOCYTES # BLD AUTO: 0.06 K/UL (ref 0–0.04)
IMM GRANULOCYTES NFR BLD AUTO: 0.6 % (ref 0–0.5)
LYMPHOCYTES # BLD AUTO: 1 K/UL (ref 1–4.8)
LYMPHOCYTES NFR BLD: 10.6 % (ref 18–48)
MAGNESIUM SERPL-MCNC: 1.9 MG/DL (ref 1.6–2.6)
MCH RBC QN AUTO: 31.3 PG (ref 27–31)
MCHC RBC AUTO-ENTMCNC: 31 G/DL (ref 32–36)
MCV RBC AUTO: 101 FL (ref 82–98)
MONOCYTES # BLD AUTO: 1 K/UL (ref 0.3–1)
MONOCYTES NFR BLD: 10.4 % (ref 4–15)
NEUTROPHILS # BLD AUTO: 6.9 K/UL (ref 1.8–7.7)
NEUTROPHILS NFR BLD: 71.1 % (ref 38–73)
NRBC BLD-RTO: 0 /100 WBC
PHOSPHATE SERPL-MCNC: 2 MG/DL (ref 2.7–4.5)
PLATELET # BLD AUTO: 249 K/UL (ref 150–450)
PMV BLD AUTO: 12.1 FL (ref 9.2–12.9)
POCT GLUCOSE: 101 MG/DL (ref 70–110)
POCT GLUCOSE: 103 MG/DL (ref 70–110)
POCT GLUCOSE: 171 MG/DL (ref 70–110)
POCT GLUCOSE: 173 MG/DL (ref 70–110)
POCT GLUCOSE: 181 MG/DL (ref 70–110)
POTASSIUM SERPL-SCNC: 3.2 MMOL/L (ref 3.5–5.1)
PROT SERPL-MCNC: 4.9 G/DL (ref 6–8.4)
RBC # BLD AUTO: 2.27 M/UL (ref 4–5.4)
SODIUM SERPL-SCNC: 141 MMOL/L (ref 136–145)
WBC # BLD AUTO: 9.77 K/UL (ref 3.9–12.7)

## 2024-04-30 PROCEDURE — 25000003 PHARM REV CODE 250: Performed by: STUDENT IN AN ORGANIZED HEALTH CARE EDUCATION/TRAINING PROGRAM

## 2024-04-30 PROCEDURE — 21400001 HC TELEMETRY ROOM

## 2024-04-30 PROCEDURE — 36415 COLL VENOUS BLD VENIPUNCTURE: CPT | Performed by: STUDENT IN AN ORGANIZED HEALTH CARE EDUCATION/TRAINING PROGRAM

## 2024-04-30 PROCEDURE — 25000003 PHARM REV CODE 250: Performed by: HOSPITALIST

## 2024-04-30 PROCEDURE — 80053 COMPREHEN METABOLIC PANEL: CPT | Performed by: STUDENT IN AN ORGANIZED HEALTH CARE EDUCATION/TRAINING PROGRAM

## 2024-04-30 PROCEDURE — 85025 COMPLETE CBC W/AUTO DIFF WBC: CPT | Performed by: STUDENT IN AN ORGANIZED HEALTH CARE EDUCATION/TRAINING PROGRAM

## 2024-04-30 PROCEDURE — 83735 ASSAY OF MAGNESIUM: CPT | Performed by: STUDENT IN AN ORGANIZED HEALTH CARE EDUCATION/TRAINING PROGRAM

## 2024-04-30 PROCEDURE — 63600175 PHARM REV CODE 636 W HCPCS: Performed by: HOSPITALIST

## 2024-04-30 PROCEDURE — 84100 ASSAY OF PHOSPHORUS: CPT | Performed by: STUDENT IN AN ORGANIZED HEALTH CARE EDUCATION/TRAINING PROGRAM

## 2024-04-30 PROCEDURE — 63600175 PHARM REV CODE 636 W HCPCS: Performed by: STUDENT IN AN ORGANIZED HEALTH CARE EDUCATION/TRAINING PROGRAM

## 2024-04-30 RX ORDER — MORPHINE SULFATE 2 MG/ML
2 INJECTION, SOLUTION INTRAMUSCULAR; INTRAVENOUS EVERY 4 HOURS PRN
Status: DISCONTINUED | OUTPATIENT
Start: 2024-04-30 | End: 2024-05-01

## 2024-04-30 RX ORDER — SODIUM,POTASSIUM PHOSPHATES 280-250MG
2 POWDER IN PACKET (EA) ORAL ONCE
Status: COMPLETED | OUTPATIENT
Start: 2024-04-30 | End: 2024-04-30

## 2024-04-30 RX ORDER — HYDROCODONE BITARTRATE AND ACETAMINOPHEN 5; 325 MG/1; MG/1
1 TABLET ORAL EVERY 6 HOURS PRN
Status: DISCONTINUED | OUTPATIENT
Start: 2024-04-30 | End: 2024-05-02 | Stop reason: HOSPADM

## 2024-04-30 RX ORDER — SODIUM,POTASSIUM PHOSPHATES 280-250MG
2 POWDER IN PACKET (EA) ORAL
Status: CANCELLED | OUTPATIENT
Start: 2024-04-30 | End: 2024-04-30

## 2024-04-30 RX ADMIN — MORPHINE SULFATE 2 MG: 2 INJECTION, SOLUTION INTRAMUSCULAR; INTRAVENOUS at 06:04

## 2024-04-30 RX ADMIN — ATORVASTATIN CALCIUM 20 MG: 20 TABLET, FILM COATED ORAL at 08:04

## 2024-04-30 RX ADMIN — LOSARTAN POTASSIUM 50 MG: 50 TABLET, FILM COATED ORAL at 09:04

## 2024-04-30 RX ADMIN — METHOCARBAMOL 500 MG: 500 TABLET ORAL at 06:04

## 2024-04-30 RX ADMIN — POTASSIUM & SODIUM PHOSPHATES POWDER PACK 280-160-250 MG 2 PACKET: 280-160-250 PACK at 09:04

## 2024-04-30 RX ADMIN — INSULIN DETEMIR 4 UNITS: 100 INJECTION, SOLUTION SUBCUTANEOUS at 08:04

## 2024-04-30 RX ADMIN — PREGABALIN 25 MG: 25 CAPSULE ORAL at 08:04

## 2024-04-30 RX ADMIN — METHOCARBAMOL 500 MG: 500 TABLET ORAL at 12:04

## 2024-04-30 RX ADMIN — CETIRIZINE HYDROCHLORIDE 5 MG: 5 TABLET, FILM COATED ORAL at 08:04

## 2024-04-30 RX ADMIN — ENOXAPARIN SODIUM 30 MG: 30 INJECTION SUBCUTANEOUS at 06:04

## 2024-04-30 RX ADMIN — PANCRELIPASE 1 CAPSULE: 24000; 76000; 120000 CAPSULE, DELAYED RELEASE PELLETS ORAL at 11:04

## 2024-04-30 RX ADMIN — INSULIN ASPART 2 UNITS: 100 INJECTION, SOLUTION INTRAVENOUS; SUBCUTANEOUS at 06:04

## 2024-04-30 RX ADMIN — PANCRELIPASE 1 CAPSULE: 24000; 76000; 120000 CAPSULE, DELAYED RELEASE PELLETS ORAL at 06:04

## 2024-04-30 RX ADMIN — DOCUSATE SODIUM AND SENNOSIDES 1 TABLET: 8.6; 5 TABLET, FILM COATED ORAL at 09:04

## 2024-04-30 RX ADMIN — METHOCARBAMOL 500 MG: 500 TABLET ORAL at 11:04

## 2024-04-30 RX ADMIN — HYDROCODONE BITARTRATE AND ACETAMINOPHEN 1 TABLET: 5; 325 TABLET ORAL at 08:04

## 2024-04-30 RX ADMIN — OXYCODONE HYDROCHLORIDE AND ACETAMINOPHEN 500 MG: 500 TABLET ORAL at 09:04

## 2024-04-30 RX ADMIN — DOCUSATE SODIUM AND SENNOSIDES 1 TABLET: 8.6; 5 TABLET, FILM COATED ORAL at 08:04

## 2024-04-30 RX ADMIN — MORPHINE SULFATE 2 MG: 2 INJECTION, SOLUTION INTRAMUSCULAR; INTRAVENOUS at 09:04

## 2024-04-30 RX ADMIN — MONTELUKAST 10 MG: 10 TABLET, FILM COATED ORAL at 08:04

## 2024-04-30 NOTE — ASSESSMENT & PLAN NOTE
Patient with Acute on chronic debility due to age-related physical debility.  Status post left femoral repair on OR 3/31 from a mechanical fall and subsequently discharged to rehab after hospitalization.  Patient progressing well postop with acute onset right hip/groin pain with difficulty ambulating secondary to symptoms.  Ambulates with walker at baseline.  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  Surgical changes are noted of the proximal left femur without femoroacetabular dislocation.  - MRI Pelvis and right hip without significant findings.  Notable for degenerative changes of right hip and right acetabular joint  - PT/OT

## 2024-04-30 NOTE — ASSESSMENT & PLAN NOTE
Chronic, controlled. Latest blood pressure and vitals reviewed-     Temp:  [97.9 °F (36.6 °C)-98.4 °F (36.9 °C)]   Pulse:  []   Resp:  [20-24]   BP: (129-167)/(61-74)   SpO2:  [96 %-100 %] .   Home meds for hypertension were reviewed and noted below.   Hypertension Medications               chlorthalidone (HYGROTEN) 25 MG Tab Take 1 tablet (25 mg total) by mouth once daily.    irbesartan (AVAPRO) 300 MG tablet Take 1 tablet (300 mg total) by mouth every evening.    losartan (COZAAR) 50 MG tablet Take 50 mg by mouth.            While in the hospital, will manage blood pressure as follows; Continue home antihypertensive regimen    Will utilize p.r.n. blood pressure medication only if patient's blood pressure greater than 160/100 and she develops symptoms such as worsening chest pain or shortness of breath.

## 2024-04-30 NOTE — SUBJECTIVE & OBJECTIVE
Past Medical History:   Diagnosis Date    Asthma     Cyst of pancreas     liver    Diabetes mellitus type II     Eczema of hand     Glaucoma     History of colon cancer 10/28/2022    Right sided colon cancer diagnosed in setting of LBO requiring urgent hemicolectomy 10/25/2022 with path showing pT4aN0 disease. CT chest 12/7/22 with possible lung metastases and MR liver with indeterminate liver lesions. 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 Patient elected for surveillance.       History of recurrent pneumonia 09/28/2020    Hyperlipidemia     Hypertension     Lichen planus     gums    Osteoporosis     Other chronic pancreatitis 03/01/2023    Pancreas cyst 03/18/2016    Pulmonary nodules     Spinal stenosis of lumbar region 08/30/2018       Past Surgical History:   Procedure Laterality Date    BRONCHOSCOPY N/A 5/13/2022    Procedure: Bronchoscopy;  Surgeon: [facility] Provider;  [facility];  Service: Anesthesiology;  Laterality: N/A;    CATARACT EXTRACTION, BILATERAL      CHOLECYSTECTOMY      COLECTOMY, RIGHT Right 10/25/2022    Procedure: COLECTOMY, RIGHT;  Surgeon: Jass Holman MD;  [facility];  Service: Colon and Rectal;  Laterality: Right;    COLONOSCOPY N/A 6/26/2023    Procedure: COLONOSCOPY;  Surgeon: Jass Holman MD;  Location: [facility] ENDO (2ND FLR);  Service: Endoscopy;  Laterality: N/A;  2nd floor -lung disease  referral Dr [id number] portal/mailed-GT  6/20/23- [place] confirmed- [place]    ENDOSCOPIC ULTRASOUND OF UPPER GASTROINTESTINAL TRACT N/A 4/22/2022    Procedure: ULTRASOUND, UPPER GI TRACT, ENDOSCOPIC;  Surgeon: Max Rosado MD;  [facility]  Service: Endoscopy;  Laterality: N/A;  urgent EUS (linear) for abnormal CT scan with dilated PD and increased cyst of pancreas cyst.  pap 44 mmHg  4/13:fully vaccinated. instructions via portal-[facility]     EPIDURAL STEROID INJECTION INTO LUMBAR SPINE N/A 11/8/2018    Procedure: Injection-steroid-epidural-lumbar L5-S1;  Surgeon: Nicci Osorio Jr., MD;  [facility];  Service: Pain Management;  Laterality: N/A;    FUNCTIONAL ENDOSCOPIC SINUS SURGERY (FESS) USING COMPUTER-ASSISTED NAVIGATION N/A 6/17/2019    Procedure: FESS, USING COMPUTER-ASSISTED NAVIGATION;  Surgeon: Rosangela Isabel MD;  Location: [facility] OR;  Service: ENT;  Laterality: N/A;  video    HYSTERECTOMY      INTRAMEDULLARY RODDING OF FEMUR Left 3/31/2024    Procedure: INSERTION, INTRAMEDULLARY CAYDEN, FEMUR - Left,;  Surgeon: Nakul Walker MD;  Location: [address];  Service: Orthopedics;  Laterality: Left;    nasal polyps         Review of patient's allergies indicates:   Allergen Reactions    Aspirin Other (See Comments)     Asthma    TRIAD OF NASAL POLYPS, ASA  ALLERGY AND ASTHMA.        Aspirin Other (See Comments)    Nsaids (non-steroidal anti-inflammatory drug) Other (See Comments)     AVOID DUE TO TRIAD OF ASA ALLERGY, NASAL POLYPS,AND ASTHMA  AVOID DUE TO TRIAD OF ASA ALLERGY, NASAL POLYPS,AND ASTHMA    Penicillin      Other reaction(s): Rash    9/30/20: tolerates ceftriaxone    Penicillin g Other (See Comments)     Other reaction(s): Rash    9/30/20: tolerates ceftriaxone       Current Facility-Administered Medications   Medication Dose Route Frequency Provider Last Rate Last Admin    acetaminophen tablet 650 mg  650 mg Oral Q8H PRN Albert Flores MD        albuterol sulfate nebulizer solution 2.5 mg  2.5 mg Nebulization Q4H PRN Albert Flores MD        [START ON 4/30/2024] ascorbic acid (vitamin C) tablet 500 mg  500 mg Oral Daily Albert Flores MD        atorvastatin tablet 20 mg  20 mg Oral QHS Albert Flores MD        bisacodyL suppository 10 mg  10 mg Rectal Daily PRN Albert Flores MD        calcium carbonate 200 mg calcium (500 mg) chewable tablet 500 mg  500 mg Oral TID PRN Mark  Albert EDMONDS MD        cetirizine tablet 5 mg  5 mg Oral QHS Albert Flores MD        dextrose 10% bolus 125 mL 125 mL  12.5 g Intravenous PRAlbert Powell MD        dextrose 10% bolus 250 mL 250 mL  25 g Intravenous PRAlbert Powell MD        enoxaparin injection 30 mg  30 mg Subcutaneous Daily Albert Flores MD   30 mg at 04/29/24 1910    glucagon (human recombinant) injection 1 mg  1 mg Intramuscular PRN Albert Flores MD        glucose chewable tablet 16 g  16 g Oral PRAlbert Powell MD        glucose chewable tablet 24 g  24 g Oral PRN Albert Flores MD        insulin aspart U-100 pen 0-10 Units  0-10 Units Subcutaneous QID (AC + HS) PRAlbert Powell MD        insulin detemir U-100 (Levemir) pen 4 Units  4 Units Subcutaneous BID Albert Flores MD        [START ON 4/30/2024] lipase-protease-amylase 24,000-76,000-120,000 units capsule 1 capsule  1 capsule Oral TID AC Albert Flores MD        [START ON 4/30/2024] losartan tablet 50 mg  50 mg Oral Daily Albert Flores MD        melatonin tablet 6 mg  6 mg Oral Nightly PRN Brad Conner DO        methocarbamoL tablet 500 mg  500 mg Oral Q6H Albert Flores MD   500 mg at 04/29/24 1909    montelukast tablet 10 mg  10 mg Oral QHS Albert Flores MD        naloxone 0.4 mg/mL injection 0.02 mg  0.02 mg Intravenous PRAlbert Powell MD        ondansetron disintegrating tablet 8 mg  8 mg Oral Q8H PRAlbert Powell MD        polyethylene glycol packet 17 g  17 g Oral Daily PRAlbert Powell MD        pregabalin capsule 25 mg  25 mg Oral BID Albert Flores MD        prochlorperazine injection Soln 5 mg  5 mg Intravenous Q6H PRAlbert Powell MD        senna-docusate 8.6-50 mg per tablet 1 tablet  1 tablet Oral BID Mark, Albert J., MD        sodium chloride 0.9% flush 10 mL  10 mL Intravenous PRN Brad Conner DO        sodium chloride 0.9% flush 10 mL  10 mL  Intravenous Q12H PRN Albert Flores MD         Current Outpatient Medications   Medication Sig Dispense Refill    acetaminophen (TYLENOL) 500 MG tablet Take 2 tablets (1,000 mg total) by mouth every 8 (eight) hours.  0    albuterol (PROVENTIL) 2.5 mg /3 mL (0.083 %) nebulizer solution Take 3 mLs (2.5 mg total) by nebulization every 6 (six) hours as needed for Wheezing or Shortness of Breath. Rescue 60 each 0    apixaban (ELIQUIS) 2.5 mg Tab Take 1 tablet (2.5 mg total) by mouth 2 (two) times daily. End date 4/29/24      ascorbic acid, vitamin C, (VITAMIN C) 500 MG tablet Take 500 mg by mouth once daily.      atorvastatin (LIPITOR) 20 MG tablet Take 1 tablet (20 mg total) by mouth every evening. 90 tablet 1    calcium carbonate (TUMS) 200 mg calcium (500 mg) chewable tablet Take 1 tablet (500 mg total) by mouth 3 (three) times daily as needed for Heartburn.      cetirizine (ZYRTEC) 5 MG tablet Take 1 tablet (5 mg total) by mouth every evening.  0    chlorthalidone (HYGROTEN) 25 MG Tab Take 1 tablet (25 mg total) by mouth once daily. (Patient not taking: Reported on 4/19/2024) 90 tablet 3    denosumab (PROLIA) 60 mg/mL Syrg Inject 1 mL (60 mg total) into the skin every 6 (six) months. Hold at rehab      fluticasone furoate-vilanteroL (BREO) 200-25 mcg/dose DsDv diskus inhaler Inhale 1 puff into the lungs once daily. If home trillegy inhaler is not on formulary use this instead at rehab. If trillegy is on formulary at rehab then stop this at rehab (Patient not taking: Reported on 4/19/2024)      fluticasone-umeclidin-vilanter (TRELEGY ELLIPTA) 200-62.5-25 mcg inhaler Inhale 1 puff into the lungs once daily. Hold at rehah if not on formulary 3 each 4    insulin aspart U-100 (NOVOLOG FLEXPEN U-100 INSULIN) 100 unit/mL (3 mL) InPn pen Inject 5 Units into the skin 3 (three) times daily.  0    insulin aspart U-100 (NOVOLOG) 100 unit/mL (3 mL) InPn pen Inject 0-5 Units into the skin before meals and at bedtime as needed  (Hyperglycemia).  0    insulin detemir U-100, Levemir, 100 unit/mL (3 mL) SubQ InPn pen Inject 4 Units into the skin 2 (two) times daily.  0    irbesartan (AVAPRO) 300 MG tablet Take 1 tablet (300 mg total) by mouth every evening. 90 tablet 3    latanoprost 0.005 % ophthalmic solution Inject into the eye. 1 Drops Ophthalmic Every evening      lipase-protease-amylase 24,000-76,000-120,000 units (CREON) 24,000-76,000 -120,000 unit capsule Take 2 capsules with meals orally and 1 capsule with snacks. 300 capsule 11    losartan (COZAAR) 50 MG tablet Take 50 mg by mouth.      magnesium oxide (MAG-OX) 400 mg (241.3 mg magnesium) tablet Take 400 mg by mouth once daily.      melatonin (MELATIN) 3 mg tablet Take 2 tablets (6 mg total) by mouth nightly as needed for Insomnia.  0    methocarbamoL (ROBAXIN) 500 MG Tab Take 1 tablet (500 mg total) by mouth every 6 (six) hours. 40 tablet 0    montelukast (SINGULAIR) 10 mg tablet Take 1 tablet (10 mg total) by mouth every evening. 30 tablet 6    olopatadine (PATANOL) 0.1 % ophthalmic solution Place 1 drop into both eyes 2 (two) times daily as needed for Allergies. 1 Drops Ophthalmic Twice a day      ondansetron (ZOFRAN-ODT) 8 MG TbDL Take 1 tablet (8 mg total) by mouth every 6 (six) hours as needed.      polyethylene glycol (GLYCOLAX) 17 gram PwPk Take 17 g by mouth once daily. (Patient not taking: Reported on 4/19/2024)  0    pregabalin (LYRICA) 25 MG capsule Take 1 capsule (25 mg total) by mouth 2 (two) times daily. 60 capsule 0    senna-docusate 8.6-50 mg (PERICOLACE) 8.6-50 mg per tablet Take 1 tablet by mouth 2 (two) times daily. (Patient not taking: Reported on 4/19/2024)      vitamin D (VITAMIN D3) 1000 units Tab Take 1,000 Units by mouth once daily.       Family History       Problem Relation (Age of Onset)    Heart disease Father, Brother    Hypertension Brother          Tobacco Use    Smoking status: Never     Passive exposure: Never    Smokeless tobacco: Never    Substance and Sexual Activity    Alcohol use: Yes     Comment: socially    Drug use: No    Sexual activity: Yes     Partners: Male     Birth control/protection: Post-menopausal     Review of Systems   Constitutional:  Negative for activity change, appetite change, chills, diaphoresis, fatigue and fever.   HENT:  Negative for rhinorrhea and sore throat.    Respiratory:  Negative for cough, chest tightness and shortness of breath.    Cardiovascular:  Negative for chest pain and palpitations.   Gastrointestinal:  Negative for abdominal pain, constipation, diarrhea and nausea.   Endocrine: Negative for cold intolerance.   Genitourinary:  Negative for decreased urine volume and dysuria.   Musculoskeletal:  Positive for arthralgias (Bilateral hips, worse on right) and gait problem. Negative for back pain and myalgias.   Skin:  Negative for rash and wound.   Neurological:  Negative for dizziness, weakness, numbness and headaches.   Psychiatric/Behavioral:  Negative for agitation, behavioral problems and confusion.      Objective:     Vital Signs (Most Recent):  Temp: 97.9 °F (36.6 °C) (04/29/24 1546)  Pulse: 83 (04/29/24 1702)  Resp: 20 (04/29/24 1702)  BP: 129/61 (04/29/24 1702)  SpO2: 100 % (04/29/24 1702) Vital Signs (24h Range):  Temp:  [97.9 °F (36.6 °C)-98.4 °F (36.9 °C)] 97.9 °F (36.6 °C)  Pulse:  [] 83  Resp:  [20-24] 20  SpO2:  [96 %-100 %] 100 %  BP: (129-167)/(61-74) 129/61     Weight: 50.8 kg (112 lb)  Body mass index is 19.22 kg/m².     Physical Exam  Constitutional:       General: She is not in acute distress.     Appearance: Normal appearance.   HENT:      Head: Normocephalic and atraumatic.      Mouth/Throat:      Mouth: Mucous membranes are moist.   Eyes:      Extraocular Movements: Extraocular movements intact.      Conjunctiva/sclera: Conjunctivae normal.   Cardiovascular:      Rate and Rhythm: Normal rate and regular rhythm.   Pulmonary:      Effort: Pulmonary effort is normal. No respiratory  distress.      Breath sounds: Normal breath sounds. No wheezing or rales.   Abdominal:      General: Abdomen is flat. Bowel sounds are normal.      Palpations: Abdomen is soft.      Tenderness: There is no abdominal tenderness. There is no guarding.   Musculoskeletal:         General: No swelling or tenderness (Outer hips).      Right lower leg: No edema.      Left lower leg: No edema.   Skin:     Findings: No lesion (Well-healing surgical scar left hip) or rash.   Neurological:      General: No focal deficit present.      Mental Status: She is alert and oriented to person, place, and time. Mental status is at baseline.      Motor: Weakness (Bilateral lower extremity) present.   Psychiatric:         Mood and Affect: Mood normal.         Behavior: Behavior normal.                Significant Labs: All pertinent labs within the past 24 hours have been reviewed.  CBC:   Recent Labs   Lab 04/29/24  1236   WBC 12.01   HGB 8.5*   HCT 27.3*        CMP:   Recent Labs   Lab 04/29/24  1236      K 3.5   *   CO2 19*   *   BUN 23   CREATININE 1.0   CALCIUM 8.6*   PROT 6.0   ALBUMIN 2.7*   BILITOT 0.6   ALKPHOS 111   AST 12   ALT 8*   ANIONGAP 9       Significant Imaging: I have reviewed all pertinent imaging results/findings within the past 24 hours.

## 2024-04-30 NOTE — NURSING
Nurses Note -- 4 Eyes      4/30/2024   1:39 AM      Skin assessed during: Admit      [x] No Altered Skin Integrity Present    [x]Prevention Measures Documented      [] Yes- Altered Skin Integrity Present or Discovered   [] LDA Added if Not in Epic (Describe Wound)   [] New Altered Skin Integrity was Present on Admit and Documented in LDA   [] Wound Image Taken    Wound Care Consulted? No    Attending Nurse:  Joi Arrieta RN/Staff Member:  Kourtney. PCT

## 2024-04-30 NOTE — ASSESSMENT & PLAN NOTE
"Patient's FSGs are controlled on current medication regimen.  Last A1c reviewed-   Lab Results   Component Value Date    HGBA1C 7.4 (H) 03/30/2024     Most recent fingerstick glucose reviewed- No results for input(s): "POCTGLUCOSE" in the last 24 hours.  Current correctional scale  Medium  Maintain anti-hyperglycemic dose as follows-   Antihyperglycemics (From admission, onward)      Start     Stop Route Frequency Ordered    04/29/24 2100  insulin detemir U-100 (Levemir) pen 4 Units         -- SubQ 2 times daily 04/29/24 1645    04/29/24 1745  insulin aspart U-100 pen 0-10 Units         -- SubQ Before meals & nightly PRN 04/29/24 1645          Hold Oral hypoglycemics while patient is in the hospital.  "

## 2024-04-30 NOTE — HPI
Ms. Sussy Costa is a  88 y.o. female with recent left intertrochanteric fracture s/p surgery (dc 4/3/24 to [facility]), Type 2 DM, CKD stage 3, HTN, pancreatic insufficiency, Asthma, Afib presents with 1 day of right hip pain.  Patient states that she underwent inpatient rehab and was at home with acute onset of right hip pain with difficulty bearing weight.  Denies any recent falls, trauma, and increased weakness prior to onset of symptoms.  Notes pain at her right groin that radiates down to her right calf.  Denies fevers, nausea, dyspnea, constipation, dysuria, and headache.  Patient presented to severity of symptoms and difficulty ambulating.    In the ED, /73, HR 95, on room air, and afebrile.  Labs notable for creatinine 1.0, WBC 12, hemoglobin 8.5, CK 28.  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  CT abdomen/pelvis with osteopenia with noted degenerative changes of the right for more acetabular joint, appropriate postsurgical changes of left femur, during the change so sacroiliac joints and spine.  Ultrasound bilateral lower extremities negative for DVT.  Patient given cefazolin, Tylenol, and 2 g Mag in the ED.

## 2024-04-30 NOTE — PROGRESS NOTES
[facility] - [facility] S[facility] ([address] Medicine  Progress Note    Patient Name: Sussy Costa  MRN: [id number]  Patient Class: IP- Inpatient   Admission Date: 4/29/2024  Length of Stay: 1 days  Attending Physician: Albert Flores MD  Primary Care Provider: Leon Ramírez MD        Subjective:     Principal Problem:Right hip pain        HPI:  Ms. Sussy Costa is a  88 y.o. female with recent left intertrochanteric fracture s/p surgery (dc 4/3/24 to [facility]), Type 2 DM, CKD stage 3, HTN, pancreatic insufficiency, Asthma, Afib presents with 1 day of right hip pain.  Patient states that she underwent inpatient rehab and was at home with acute onset of right hip pain with difficulty bearing weight.  Denies any recent falls, trauma, and increased weakness prior to onset of symptoms.  Notes pain at her right groin that radiates down to her right calf.  Denies fevers, nausea, dyspnea, constipation, dysuria, and headache.  Patient presented to severity of symptoms and difficulty ambulating.    In the ED, /73, HR 95, on room air, and afebrile.  Labs notable for creatinine 1.0, WBC 12, hemoglobin 8.5, CK 28.  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  CT abdomen/pelvis with osteopenia with noted degenerative changes of the right for more acetabular joint, appropriate postsurgical changes of left femur, during the change so sacroiliac joints and spine.  Ultrasound bilateral lower extremities negative for DVT.  Patient given cefazolin, Tylenol, and 2 g Mag in the ED.    Overview/Hospital Course:  No notes on file    Interval History:   No events overnight.  Patient with continued right hip pain.  MRI completed.  PT/OT consulted.      Review of Systems   Constitutional:  Negative for activity change, appetite change, chills, diaphoresis, fatigue and fever.   HENT:  Negative for rhinorrhea and sore throat.    Respiratory:  Negative for cough, chest tightness and shortness of breath.    Cardiovascular:  Negative for chest pain  and palpitations.   Gastrointestinal:  Negative for abdominal pain, constipation, diarrhea and nausea.   Endocrine: Negative for cold intolerance.   Genitourinary:  Negative for decreased urine volume and dysuria.   Musculoskeletal:  Positive for arthralgias (Bilateral hips, worse on right) and gait problem. Negative for back pain and myalgias.   Skin:  Negative for rash and wound.   Neurological:  Negative for dizziness, weakness, numbness and headaches.   Psychiatric/Behavioral:  Negative for agitation, behavioral problems and confusion.      Objective:     Vital Signs (Most Recent):  Temp: 98.4 °F (36.9 °C) (04/30/24 1630)  Pulse: 95 (04/30/24 1630)  Resp: 18 (04/30/24 1822)  BP: (!) 132/57 (04/30/24 1630)  SpO2: 95 % (04/30/24 1630) Vital Signs (24h Range):  Temp:  [97.3 °F (36.3 °C)-98.6 °F (37 °C)] 98.4 °F (36.9 °C)  Pulse:  [82-95] 95  Resp:  [16-18] 18  SpO2:  [93 %-98 %] 95 %  BP: (106-169)/(55-84) 132/57     Weight: 56.1 kg (123 lb 10.9 oz)  Body mass index is 21.23 kg/m².    Intake/Output Summary (Last 24 hours) at 4/30/2024 1847  Last data filed at 4/30/2024 1801  Gross per 24 hour   Intake 960 ml   Output 600 ml   Net 360 ml      Physical Exam  Constitutional:       General: She is not in acute distress.     Appearance: Normal appearance.   HENT:      Head: Normocephalic and atraumatic.      Mouth/Throat:      Mouth: Mucous membranes are moist.   Eyes:      Extraocular Movements: Extraocular movements intact.      Conjunctiva/sclera: Conjunctivae normal.   Cardiovascular:      Rate and Rhythm: Normal rate and regular rhythm.   Pulmonary:      Effort: Pulmonary effort is normal. No respiratory distress.      Breath sounds: Normal breath sounds. No wheezing or rales.   Abdominal:      General: Abdomen is flat. Bowel sounds are normal.      Palpations: Abdomen is soft.      Tenderness: There is no abdominal tenderness. There is no guarding.   Musculoskeletal:         General: No swelling or tenderness  "(Outer hips).      Right lower leg: No edema.      Left lower leg: No edema.   Skin:     Findings: No lesion (Well-healing surgical scar left hip) or rash.   Neurological:      General: No focal deficit present.      Mental Status: She is alert and oriented to person, place, and time. Mental status is at baseline.      Motor: Weakness (Bilateral lower extremity) present.   Psychiatric:         Mood and Affect: Mood normal.         Behavior: Behavior normal.               Significant Labs:  CBC:  Recent Labs   Lab 04/29/24  1236 04/30/24  0510   WBC 12.01 9.77   HGB 8.5* 7.1*   HCT 27.3* 22.9*    249     CMP:  Recent Labs   Lab 04/29/24  1236 04/30/24  0510    141   K 3.5 3.2*   * 114*   CO2 19* 20*   * 105   BUN 23 22   CREATININE 1.0 1.0   CALCIUM 8.6* 8.1*   PROT 6.0 4.9*   ALBUMIN 2.7* 2.1*   BILITOT 0.6 0.4   ALKPHOS 111 96   AST 12 11   ALT 8* 6*   ANIONGAP 9 7*     PTINR:  No results for input(s): "INR" in the last 48 hours.    Significant Procedures:   None    Assessment/Plan:      * Right hip pain  Patient with Acute on chronic debility due to age-related physical debility.  Status post left femoral repair on OR 3/31 from a mechanical fall and subsequently discharged to rehab after hospitalization.  Patient progressing well postop with acute onset right hip/groin pain with difficulty ambulating secondary to symptoms.  Ambulates with walker at baseline.  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  Surgical changes are noted of the proximal left femur without femoroacetabular dislocation.  - MRI Pelvis and right hip without significant findings.  Notable for degenerative changes of right hip and right acetabular joint  - PT/OT        Diabetes mellitus with insulin therapy  Patient's FSGs are controlled on current medication regimen.  Last A1c reviewed-   Lab " "Results   Component Value Date    HGBA1C 7.4 (H) 03/30/2024     Most recent fingerstick glucose reviewed- No results for input(s): "POCTGLUCOSE" in the last 24 hours.  Current correctional scale  Medium  Maintain anti-hyperglycemic dose as follows-   Antihyperglycemics (From admission, onward)      Start     Stop Route Frequency Ordered    04/29/24 2100  insulin detemir U-100 (Levemir) pen 4 Units         -- SubQ 2 times daily 04/29/24 1645    04/29/24 1745  insulin aspart U-100 pen 0-10 Units         -- SubQ Before meals & nightly PRN 04/29/24 1645          Hold Oral hypoglycemics while patient is in the hospital.    Hypomagnesemia  Patient has Abnormal Magnesium: hypomagnesemia. Will continue to monitor electrolytes closely. Will replace the affected electrolytes and repeat labs to be done after interventions completed. The patient's magnesium results have been reviewed and are listed below.  Recent Labs   Lab 04/29/24  1236   MG 1.5*        Hyperlipidemia  Continue home atorvastatin      Primary hypertension  Chronic, controlled. Latest blood pressure and vitals reviewed-     Temp:  [97.9 °F (36.6 °C)-98.4 °F (36.9 °C)]   Pulse:  []   Resp:  [20-24]   BP: (129-167)/(61-74)   SpO2:  [96 %-100 %] .   Home meds for hypertension were reviewed and noted below.   Hypertension Medications               chlorthalidone (HYGROTEN) 25 MG Tab Take 1 tablet (25 mg total) by mouth once daily.    irbesartan (AVAPRO) 300 MG tablet Take 1 tablet (300 mg total) by mouth every evening.    losartan (COZAAR) 50 MG tablet Take 50 mg by mouth.            While in the hospital, will manage blood pressure as follows; Continue home antihypertensive regimen    Will utilize p.r.n. blood pressure medication only if patient's blood pressure greater than 160/100 and she develops symptoms such as worsening chest pain or shortness of breath.      VTE Risk Mitigation (From admission, onward)           Ordered     enoxaparin injection 30 mg  " Daily         04/29/24 1639     IP VTE HIGH RISK PATIENT  Once         04/29/24 1643     Place sequential compression device  Until discontinued         04/29/24 1643     Place sequential compression device  Until discontinued         04/29/24 1639                    Discharge Planning   ANTOINE: 5/2/2024     Code Status: Full Code   Is the patient medically ready for discharge?:     Reason for patient still in hospital (select all that apply): Patient trending condition, Laboratory test, Treatment, Imaging, PT / OT recommendations, and Pending disposition  Discharge Plan A: Home, Home with family                  Albert Flores MD  Department of Hospital Medicine   [facility] S[facility] ([place])

## 2024-04-30 NOTE — ASSESSMENT & PLAN NOTE
Patient with Acute on chronic debility due to age-related physical debility.  Status post left femoral repair on OR 3/31 from a mechanical fall and subsequently discharged to rehab after hospitalization.  Patient progressing well postop with acute onset right hip/groin pain with difficulty ambulating secondary to symptoms.  Ambulates with walker at baseline.  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  Surgical changes are noted of the proximal left femur without femoroacetabular dislocation.  - MRI Pelvis and right hip pending   - Will notify orthopedic surgery if abnormal MRI imaging   - PT/OT

## 2024-04-30 NOTE — ASSESSMENT & PLAN NOTE
Patient has Abnormal Magnesium: hypomagnesemia. Will continue to monitor electrolytes closely. Will replace the affected electrolytes and repeat labs to be done after interventions completed. The patient's magnesium results have been reviewed and are listed below.  Recent Labs   Lab 04/29/24  1236   MG 1.5*

## 2024-04-30 NOTE — PLAN OF CARE
Problem: Adult Inpatient Plan of Care  Goal: Plan of Care Review  Outcome: Progressing  Goal: Patient-Specific Goal (Individualized)  Outcome: Progressing  Goal: Absence of Hospital-Acquired Illness or Injury  Outcome: Progressing  Goal: Optimal Comfort and Wellbeing  Outcome: Progressing  Goal: Readiness for Transition of Care  Outcome: Progressing     Problem: Diabetes Comorbidity  Goal: Blood Glucose Level Within Targeted Range  Outcome: Progressing     Problem: Acute Kidney Injury/Impairment  Goal: Fluid and Electrolyte Balance  Outcome: Progressing  Goal: Improved Oral Intake  Outcome: Progressing  Goal: Effective Renal Function  Outcome: Progressing     Problem: Wound  Goal: Optimal Coping  Outcome: Progressing  Goal: Optimal Functional Ability  Outcome: Progressing  Goal: Absence of Infection Signs and Symptoms  Outcome: Progressing  Goal: Improved Oral Intake  Outcome: Progressing  Goal: Optimal Pain Control and Function  Outcome: Progressing  Goal: Skin Health and Integrity  Outcome: Progressing  Goal: Optimal Wound Healing  Outcome: Progressing     Problem: Fall Injury Risk  Goal: Absence of Fall and Fall-Related Injury  Outcome: Progressing     Problem: Skin Injury Risk Increased  Goal: Skin Health and Integrity  Outcome: Progressing

## 2024-04-30 NOTE — SUBJECTIVE & OBJECTIVE
Interval History:   No events overnight.  Patient with continued right hip pain.  MRI completed.  PT/OT consulted.      Review of Systems   Constitutional:  Negative for activity change, appetite change, chills, diaphoresis, fatigue and fever.   HENT:  Negative for rhinorrhea and sore throat.    Respiratory:  Negative for cough, chest tightness and shortness of breath.    Cardiovascular:  Negative for chest pain and palpitations.   Gastrointestinal:  Negative for abdominal pain, constipation, diarrhea and nausea.   Endocrine: Negative for cold intolerance.   Genitourinary:  Negative for decreased urine volume and dysuria.   Musculoskeletal:  Positive for arthralgias (Bilateral hips, worse on right) and gait problem. Negative for back pain and myalgias.   Skin:  Negative for rash and wound.   Neurological:  Negative for dizziness, weakness, numbness and headaches.   Psychiatric/Behavioral:  Negative for agitation, behavioral problems and confusion.      Objective:     Vital Signs (Most Recent):  Temp: 98.4 °F (36.9 °C) (04/30/24 1630)  Pulse: 95 (04/30/24 1630)  Resp: 18 (04/30/24 1822)  BP: (!) 132/57 (04/30/24 1630)  SpO2: 95 % (04/30/24 1630) Vital Signs (24h Range):  Temp:  [97.3 °F (36.3 °C)-98.6 °F (37 °C)] 98.4 °F (36.9 °C)  Pulse:  [82-95] 95  Resp:  [16-18] 18  SpO2:  [93 %-98 %] 95 %  BP: (106-169)/(55-84) 132/57     Weight: 56.1 kg (123 lb 10.9 oz)  Body mass index is 21.23 kg/m².    Intake/Output Summary (Last 24 hours) at 4/30/2024 1847  Last data filed at 4/30/2024 1801  Gross per 24 hour   Intake 960 ml   Output 600 ml   Net 360 ml      Physical Exam  Constitutional:       General: She is not in acute distress.     Appearance: Normal appearance.   HENT:      Head: Normocephalic and atraumatic.      Mouth/Throat:      Mouth: Mucous membranes are moist.   Eyes:      Extraocular Movements: Extraocular movements intact.      Conjunctiva/sclera: Conjunctivae normal.   Cardiovascular:      Rate and Rhythm:  "Normal rate and regular rhythm.   Pulmonary:      Effort: Pulmonary effort is normal. No respiratory distress.      Breath sounds: Normal breath sounds. No wheezing or rales.   Abdominal:      General: Abdomen is flat. Bowel sounds are normal.      Palpations: Abdomen is soft.      Tenderness: There is no abdominal tenderness. There is no guarding.   Musculoskeletal:         General: No swelling or tenderness (Outer hips).      Right lower leg: No edema.      Left lower leg: No edema.   Skin:     Findings: No lesion (Well-healing surgical scar left hip) or rash.   Neurological:      General: No focal deficit present.      Mental Status: She is alert and oriented to person, place, and time. Mental status is at baseline.      Motor: Weakness (Bilateral lower extremity) present.   Psychiatric:         Mood and Affect: Mood normal.         Behavior: Behavior normal.               Significant Labs:  CBC:  Recent Labs   Lab 04/29/24  1236 04/30/24  0510   WBC 12.01 9.77   HGB 8.5* 7.1*   HCT 27.3* 22.9*    249     CMP:  Recent Labs   Lab 04/29/24  1236 04/30/24  0510    141   K 3.5 3.2*   * 114*   CO2 19* 20*   * 105   BUN 23 22   CREATININE 1.0 1.0   CALCIUM 8.6* 8.1*   PROT 6.0 4.9*   ALBUMIN 2.7* 2.1*   BILITOT 0.6 0.4   ALKPHOS 111 96   AST 12 11   ALT 8* 6*   ANIONGAP 9 7*     PTINR:  No results for input(s): "INR" in the last 48 hours.    Significant Procedures:   None  "

## 2024-04-30 NOTE — PLAN OF CARE
[facility] - [facility] S[facility] ([facility]  Initial Discharge Assessment       Primary Care Provider: Leon Ramírez MD    Admission Diagnosis: Right hip pain [M25.551]  Bilateral leg edema [R60.0]  Ambulatory dysfunction [R26.2]  Cellulitis, unspecified cellulitis site [L03.90]    Admission Date: 4/29/2024  Expected Discharge Date: 5/2/2024         Payor: MEDICARE / Plan: MEDICARE PART A & B / Product Type: Government /     Extended Emergency Contact Information  Primary Emergency Contact: Chuck Costa  Address: [address] [place] [address] [id number] [place] [address]  Home Phone: [phone]  Work Phone: [phone]  Mobile Phone: [phone]  Relation: Spouse  Secondary Emergency Contact: Félix Banks  Work Phone: [phone]  Relation: Other  Preferred language: English   needed? No    Discharge Plan A: Home, Home with family  Discharge Plan B: Home, Home with family, Home Health (TBD)      [facility] [address] [facility]  [address] [facility] [address]  Phone: [phone] Fax: [phone]    [facility]  [place] [address]  Phone: [phone] Fax: [phone]      Initial Assessment (most recent)       Adult Discharge Assessment - 04/30/24 1303          Discharge Assessment    Assessment Type Discharge Planning Assessment     Confirmed/corrected address, phone number and insurance Yes     Confirmed Demographics Correct on Facesheet     Source of Information patient     Does patient/caregiver understand observation status Yes     Communicated ANTOINE with patient/caregiver Date not available/Unable to determine     Reason For Admission Right hip pain     People in Home spouse     Facility Arrived From: Home     Do you expect to return to your current living situation? Yes     Do you have help at home or someone to help you manage your  care at home? Yes     Who are your caregiver(s) and their phone number(s)? () Chuck [phone]     Prior to hospitilization cognitive status: Alert/Oriented     Current cognitive status: Alert/Oriented     Walking or Climbing Stairs Difficulty yes     Walking or Climbing Stairs ambulation difficulty, requires equipment     Dressing/Bathing Difficulty no     Home Layout Able to live on 1st floor     Equipment Currently Used at Home walker, rolling;wheelchair;commode     Readmission within 30 days? Yes     Patient currently being followed by outpatient case management? No     Do you currently have service(s) that help you manage your care at home? No     Do you take prescription medications? Yes     Do you have prescription coverage? Yes     Coverage MEDICARE - MEDICARE PART A & B     Do you have any problems affording any of your prescribed medications? No     Is the patient taking medications as prescribed? yes     Who is going to help you get home at discharge? -Chuck [phone]     How do you get to doctors appointments? car, drives self;family or friend will provide     Are you on dialysis? No     Do you take coumadin? No     Discharge Plan A Home;Home with family     Discharge Plan B Home;Home with family;Home Health   TBD    DME Needed Upon Discharge  --   TBD    Discharge Plan discussed with: Patient        Physical Activity    On average, how many days per week do you engage in moderate to strenuous exercise (like a brisk walk)? 7 days   With walker    On average, how many minutes do you engage in exercise at this level? 20 min        Financial Resource Strain    How hard is it for you to pay for the very basics like food, housing, medical care, and heating? Not hard at all        Housing Stability    In the last 12 months, was there a time when you were not able to pay the mortgage or rent on time? No     At any time in the past 12 months, were you homeless or living in a shelter (including  "now)? No        Transportation Needs    In the past 12 months, has lack of transportation kept you from medical appointments or from getting medications? No     In the past 12 months, has lack of transportation kept you from meetings, work, or from getting things needed for daily living? No        Food Insecurity    Within the past 12 months, you worried that your food would run out before you got the money to buy more. Never true     Within the past 12 months, the food you bought just didn't last and you didn't have money to get more. Never true        Stress    Do you feel stress - tense, restless, nervous, or anxious, or unable to sleep at night because your mind is troubled all the time - these days? Only a little        Alcohol Use    Q1: How often do you have a drink containing alcohol? Patient declined     Q2: How many drinks containing alcohol do you have on a typical day when you are drinking? Patient declined     Q3: How often do you have six or more drinks on one occasion? Patient declined                     Readmission Assessment (most recent)       Readmission Assessment - 04/30/24 1307          Readmission    Was this a planned readmission? No     Why were you hospitalized in the last 30 days? Per pt, she went to therapy for recent surgery and they informed "weeping left leg" and suggested she go to the ED     Why were you readmitted? Alarmed about signs/symptoms     When you left the hospital how did you feel? Per pt (on scale 1-10) per pain was between 8 and 9     When you left the hospital where did you go? --   Per pt went to rehab    Did patient/caregiver refused recommended DC plan? No     Tell me about what happened between when you left the hospital and the day you returned. Right hip pain     Did you try to manage your symptoms your self? --   Per pt came to ED    Did you try to see or did see a doctor or nurse before you came? --   Per pt came to ED    Did you have  a follow-up appointment " on discharge? Yes     Did you go? Yes                    SW met with patient in room to complete DPA. Questions answered / contact numbers provided.  Use PREFERRED PHARMACY / BEDSIDE DELIVERY for any necessary medications at time of discharge. Pt stated she is independent with all ADLs but does use her In-home equipment (rolling walker, wheelchair and toilet commode), pt is not on HD, BTs or home oxygen.Per pt, her  Chuck will be assisting with help upon discharge. Her  also, will be providing transportation home. Hospital follow up will be scheduled with PCP. Will continue to follow for course of hospitalization.    Discharge Plan A and Plan B have been determined by review of patient's clinical status, future medical and therapeutic needs, and coverage/benefits for post-acute care in coordination with multidisciplinary team members.    JUAN RAMON Lua   [facility]    Case Management Dept  [phone]

## 2024-04-30 NOTE — H&P
Markos Pacheco - Emergency Dept  [facility]  History & Physical    Patient Name: Sussy Costa  MRN: [id number]  Patient Class: IP- Inpatient  Admission Date: 4/29/2024  Attending Physician: Albert Flores MD   Primary Care Provider: Leon Ramírez MD         Patient information was obtained from patient, past medical records, and ER records.     Subjective:     Principal Problem:Right hip pain    Chief Complaint:   Chief Complaint   Patient presents with    Hip Pain     R hip and L wrist pain denies injury, complete assist out of car        HPI: Ms. Sussy Costa is a  88 y.o. female with recent left intertrochanteric fracture s/p surgery (dc 4/3/24 to [facility]), Type 2 DM, CKD stage 3, HTN, pancreatic insufficiency, Asthma, Afib presents with 1 day of right hip pain.  Patient states that she underwent inpatient rehab and was at home with acute onset of right hip pain with difficulty bearing weight.  Denies any recent falls, trauma, and increased weakness prior to onset of symptoms.  Notes pain at her right groin that radiates down to her right calf.  Denies fevers, nausea, dyspnea, constipation, dysuria, and headache.  Patient presented to severity of symptoms and difficulty ambulating.    In the ED, /73, HR 95, on room air, and afebrile.  Labs notable for creatinine 1.0, WBC 12, hemoglobin 8.5, CK 28.  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  CT abdomen/pelvis with osteopenia with noted degenerative changes of the right for more acetabular joint, appropriate postsurgical changes of left femur, during the change so sacroiliac joints and spine.  Ultrasound bilateral lower extremities negative for DVT.  Patient given cefazolin, Tylenol, and 2 g Mag in the ED.    Past Medical History:   Diagnosis Date    Asthma     Cyst of pancreas     liver    Diabetes mellitus type II     Eczema of hand     Glaucoma     History of colon cancer 10/28/2022    Right sided colon cancer diagnosed in setting of LBO requiring urgent  hemicolectomy 10/25/2022 with path showing [id number] disease. CT chest 12/7/22 with possible lung metastases and MR liver with indeterminate liver lesions. 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 Patient elected for surveillance.       History of recurrent pneumonia 09/28/2020    Hyperlipidemia     Hypertension     Lichen planus     gums    Osteoporosis     Other chronic pancreatitis 03/01/2023    Pancreas cyst 03/18/2016    Pulmonary nodules     Spinal stenosis of lumbar region 08/30/2018       Past Surgical History:   Procedure Laterality Date    BRONCHOSCOPY N/A 5/13/2022    Procedure: Bronchoscopy;  Surgeon: [facility] Diagnostic Provider;  Location: [facility] OR [address] [place] [address];  Service: Anesthesiology;  Laterality: N/A;    CATARACT EXTRACTION, BILATERAL      CHOLECYSTECTOMY      COLECTOMY, RIGHT Right 10/25/2022    Procedure: COLECTOMY, RIGHT;  Surgeon: Jass Holman MD;  Location: [facility] OR [address] [place] [address];  Service: Colon and Rectal;  Laterality: Right;    COLONOSCOPY N/A 6/26/2023    Procedure: COLONOSCOPY;  Surgeon: aJss Holman MD;  [facility]  Service: Endoscopy;  Laterality: N/A;  2nd floor -lung disease  referral Dr [id number] portal/mailed-GT  6/20/23- Precall confirmed- [place]    ENDOSCOPIC ULTRASOUND OF UPPER GASTROINTESTINAL TRACT N/A 4/22/2022    Procedure: ULTRASOUND, UPPER GI TRACT, ENDOSCOPIC;  Surgeon: Max Rosado MD;  Location: [facility] ([address] [place] [address]);  Service: Endoscopy;  Laterality: N/A;  urgent EUS (linear) for abnormal CT scan with dilated PD and increased cyst of pancreas cyst.  pap 44 mmHg  4/13:fully vaccinated. instructions via portal-SC    EPIDURAL STEROID INJECTION INTO LUMBAR SPINE N/A 11/8/2018    Procedure: Injection-steroid-epidural-lumbar L5-S1;  Surgeon: Nicci Osorio Jr., MD;  Location: [facility] PAIN MGT;  Service: Pain Management;  Laterality: N/A;    FUNCTIONAL ENDOSCOPIC  SINUS SURGERY (FESS) USING COMPUTER-ASSISTED NAVIGATION N/A 6/17/2019    Procedure: FESS, USING COMPUTER-ASSISTED NAVIGATION;  Surgeon: Rosangela Isabel MD;  Location: [facility];  Service: ENT;  Laterality: N/A;  video    HYSTERECTOMY      INTRAMEDULLARY RODDING OF FEMUR Left 3/31/2024    Procedure: INSERTION, INTRAMEDULLARY CAYDEN, FEMUR - Left,;  Surgeon: Nakul Walker MD;  Location: [address];  Service: Orthopedics;  Laterality: Left;    nasal polyps         Review of patient's allergies indicates:   Allergen Reactions    Aspirin Other (See Comments)     Asthma    TRIAD OF NASAL POLYPS, ASA  ALLERGY AND ASTHMA.        Aspirin Other (See Comments)    Nsaids (non-steroidal anti-inflammatory drug) Other (See Comments)     AVOID DUE TO TRIAD OF ASA ALLERGY, NASAL POLYPS,AND ASTHMA  AVOID DUE TO TRIAD OF ASA ALLERGY, NASAL POLYPS,AND ASTHMA    Penicillin      Other reaction(s): Rash    9/30/20: tolerates ceftriaxone    Penicillin g Other (See Comments)     Other reaction(s): Rash    9/30/20: tolerates ceftriaxone       Current Facility-Administered Medications   Medication Dose Route Frequency Provider Last Rate Last Admin    acetaminophen tablet 650 mg  650 mg Oral Q8H PRN Albert Flores MD        albuterol sulfate nebulizer solution 2.5 mg  2.5 mg Nebulization Q4H PRN Albert Flores MD        [START ON 4/30/2024] ascorbic acid (vitamin C) tablet 500 mg  500 mg Oral Daily Albert Flores MD        atorvastatin tablet 20 mg  20 mg Oral QHS Albert Flores MD        bisacodyL suppository 10 mg  10 mg Rectal Daily PRN Albert Flores MD        calcium carbonate 200 mg calcium (500 mg) chewable tablet 500 mg  500 mg Oral TID PRN Albert Flores MD        cetirizine tablet 5 mg  5 mg Oral QHS Albert Flores MD        dextrose 10% bolus 125 mL 125 mL  12.5 g Intravenous PRN Albert Flores MD        dextrose 10% bolus 250 mL 250 mL  25 g Intravenous PRN Albert Flores  MD        enoxaparin injection 30 mg  30 mg Subcutaneous Daily Albert Flores MD   30 mg at 04/29/24 1910    glucagon (human recombinant) injection 1 mg  1 mg Intramuscular PRN Albert Flores MD        glucose chewable tablet 16 g  16 g Oral PRN Albert Flores MD        glucose chewable tablet 24 g  24 g Oral PRN Albert Flores MD        insulin aspart U-100 pen 0-10 Units  0-10 Units Subcutaneous QID (AC + HS) PRAlbert Powell MD        insulin detemir U-100 (Levemir) pen 4 Units  4 Units Subcutaneous BID Albert Flores MD        [START ON 4/30/2024] lipase-protease-amylase 24,000-76,000-120,000 units capsule 1 capsule  1 capsule Oral TID AC Albert Flores MD        [START ON 4/30/2024] losartan tablet 50 mg  50 mg Oral Daily Albert Flores MD        melatonin tablet 6 mg  6 mg Oral Nightly PRN Brad Conner DO        methocarbamoL tablet 500 mg  500 mg Oral Q6H Albert Flores MD   500 mg at 04/29/24 1909    montelukast tablet 10 mg  10 mg Oral QHS Albert Flores MD        naloxone 0.4 mg/mL injection 0.02 mg  0.02 mg Intravenous PRN Albert Flores MD        ondansetron disintegrating tablet 8 mg  8 mg Oral Q8H PRN Albert Flores MD        polyethylene glycol packet 17 g  17 g Oral Daily PRN Albert Flores MD        pregabalin capsule 25 mg  25 mg Oral BID Albert Flores MD        prochlorperazine injection Soln 5 mg  5 mg Intravenous Q6H PRN Albert Flores MD        senna-docusate 8.6-50 mg per tablet 1 tablet  1 tablet Oral BID Albert Flores MD        sodium chloride 0.9% flush 10 mL  10 mL Intravenous PRN Elier Conneris LDO Valeriano        sodium chloride 0.9% flush 10 mL  10 mL Intravenous Q12H PRN Albert Flores MD         Current Outpatient Medications   Medication Sig Dispense Refill    acetaminophen (TYLENOL) 500 MG tablet Take 2 tablets (1,000 mg total) by mouth every 8 (eight) hours.  0    albuterol (PROVENTIL) 2.5 mg /3  mL (0.083 %) nebulizer solution Take 3 mLs (2.5 mg total) by nebulization every 6 (six) hours as needed for Wheezing or Shortness of Breath. Rescue 60 each 0    apixaban (ELIQUIS) 2.5 mg Tab Take 1 tablet (2.5 mg total) by mouth 2 (two) times daily. End date 4/29/24      ascorbic acid, vitamin C, (VITAMIN C) 500 MG tablet Take 500 mg by mouth once daily.      atorvastatin (LIPITOR) 20 MG tablet Take 1 tablet (20 mg total) by mouth every evening. 90 tablet 1    calcium carbonate (TUMS) 200 mg calcium (500 mg) chewable tablet Take 1 tablet (500 mg total) by mouth 3 (three) times daily as needed for Heartburn.      cetirizine (ZYRTEC) 5 MG tablet Take 1 tablet (5 mg total) by mouth every evening.  0    chlorthalidone (HYGROTEN) 25 MG Tab Take 1 tablet (25 mg total) by mouth once daily. (Patient not taking: Reported on 4/19/2024) 90 tablet 3    denosumab (PROLIA) 60 mg/mL Syrg Inject 1 mL (60 mg total) into the skin every 6 (six) months. Hold at rehab      fluticasone furoate-vilanteroL (BREO) 200-25 mcg/dose DsDv diskus inhaler Inhale 1 puff into the lungs once daily. If home trillegy inhaler is not on formulary use this instead at rehab. If trillegy is on formulary at rehab then stop this at rehab (Patient not taking: Reported on 4/19/2024)      fluticasone-umeclidin-vilanter (TRELEGY ELLIPTA) 200-62.5-25 mcg inhaler Inhale 1 puff into the lungs once daily. Hold at rehah if not on formulary 3 each 4    insulin aspart U-100 (NOVOLOG FLEXPEN U-100 INSULIN) 100 unit/mL (3 mL) InPn pen Inject 5 Units into the skin 3 (three) times daily.  0    insulin aspart U-100 (NOVOLOG) 100 unit/mL (3 mL) InPn pen Inject 0-5 Units into the skin before meals and at bedtime as needed (Hyperglycemia).  0    insulin detemir U-100, Levemir, 100 unit/mL (3 mL) SubQ InPn pen Inject 4 Units into the skin 2 (two) times daily.  0    irbesartan (AVAPRO) 300 MG tablet Take 1 tablet (300 mg total) by mouth every evening. 90 tablet 3    latanoprost  0.005 % ophthalmic solution Inject into the eye. 1 Drops Ophthalmic Every evening      lipase-protease-amylase 24,000-76,000-120,000 units (CREON) 24,000-76,000 -120,000 unit capsule Take 2 capsules with meals orally and 1 capsule with snacks. 300 capsule 11    losartan (COZAAR) 50 MG tablet Take 50 mg by mouth.      magnesium oxide (MAG-OX) 400 mg (241.3 mg magnesium) tablet Take 400 mg by mouth once daily.      melatonin (MELATIN) 3 mg tablet Take 2 tablets (6 mg total) by mouth nightly as needed for Insomnia.  0    methocarbamoL (ROBAXIN) 500 MG Tab Take 1 tablet (500 mg total) by mouth every 6 (six) hours. 40 tablet 0    montelukast (SINGULAIR) 10 mg tablet Take 1 tablet (10 mg total) by mouth every evening. 30 tablet 6    olopatadine (PATANOL) 0.1 % ophthalmic solution Place 1 drop into both eyes 2 (two) times daily as needed for Allergies. 1 Drops Ophthalmic Twice a day      ondansetron (ZOFRAN-ODT) 8 MG TbDL Take 1 tablet (8 mg total) by mouth every 6 (six) hours as needed.      polyethylene glycol (GLYCOLAX) 17 gram PwPk Take 17 g by mouth once daily. (Patient not taking: Reported on 4/19/2024)  0    pregabalin (LYRICA) 25 MG capsule Take 1 capsule (25 mg total) by mouth 2 (two) times daily. 60 capsule 0    senna-docusate 8.6-50 mg (PERICOLACE) 8.6-50 mg per tablet Take 1 tablet by mouth 2 (two) times daily. (Patient not taking: Reported on 4/19/2024)      vitamin D (VITAMIN D3) 1000 units Tab Take 1,000 Units by mouth once daily.       Family History       Problem Relation (Age of Onset)    Heart disease Father, Brother    Hypertension Brother          Tobacco Use    Smoking status: Never     Passive exposure: Never    Smokeless tobacco: Never   Substance and Sexual Activity    Alcohol use: Yes     Comment: socially    Drug use: No    Sexual activity: Yes     Partners: Male     Birth control/protection: Post-menopausal     Review of Systems   Constitutional:  Negative for activity change, appetite change,  chills, diaphoresis, fatigue and fever.   HENT:  Negative for rhinorrhea and sore throat.    Respiratory:  Negative for cough, chest tightness and shortness of breath.    Cardiovascular:  Negative for chest pain and palpitations.   Gastrointestinal:  Negative for abdominal pain, constipation, diarrhea and nausea.   Endocrine: Negative for cold intolerance.   Genitourinary:  Negative for decreased urine volume and dysuria.   Musculoskeletal:  Positive for arthralgias (Bilateral hips, worse on right) and gait problem. Negative for back pain and myalgias.   Skin:  Negative for rash and wound.   Neurological:  Negative for dizziness, weakness, numbness and headaches.   Psychiatric/Behavioral:  Negative for agitation, behavioral problems and confusion.      Objective:     Vital Signs (Most Recent):  Temp: 97.9 °F (36.6 °C) (04/29/24 1546)  Pulse: 83 (04/29/24 1702)  Resp: 20 (04/29/24 1702)  BP: 129/61 (04/29/24 1702)  SpO2: 100 % (04/29/24 1702) Vital Signs (24h Range):  Temp:  [97.9 °F (36.6 °C)-98.4 °F (36.9 °C)] 97.9 °F (36.6 °C)  Pulse:  [] 83  Resp:  [20-24] 20  SpO2:  [96 %-100 %] 100 %  BP: (129-167)/(61-74) 129/61     Weight: 50.8 kg (112 lb)  Body mass index is 19.22 kg/m².     Physical Exam  Constitutional:       General: She is not in acute distress.     Appearance: Normal appearance.   HENT:      Head: Normocephalic and atraumatic.      Mouth/Throat:      Mouth: Mucous membranes are moist.   Eyes:      Extraocular Movements: Extraocular movements intact.      Conjunctiva/sclera: Conjunctivae normal.   Cardiovascular:      Rate and Rhythm: Normal rate and regular rhythm.   Pulmonary:      Effort: Pulmonary effort is normal. No respiratory distress.      Breath sounds: Normal breath sounds. No wheezing or rales.   Abdominal:      General: Abdomen is flat. Bowel sounds are normal.      Palpations: Abdomen is soft.      Tenderness: There is no abdominal tenderness. There is no guarding.   Musculoskeletal:          General: No swelling or tenderness (Outer hips).      Right lower leg: No edema.      Left lower leg: No edema.   Skin:     Findings: No lesion (Well-healing surgical scar left hip) or rash.   Neurological:      General: No focal deficit present.      Mental Status: She is alert and oriented to person, place, and time. Mental status is at baseline.      Motor: Weakness (Bilateral lower extremity) present.   Psychiatric:         Mood and Affect: Mood normal.         Behavior: Behavior normal.                Significant Labs: All pertinent labs within the past 24 hours have been reviewed.  CBC:   Recent Labs   Lab 04/29/24  1236   WBC 12.01   HGB 8.5*   HCT 27.3*        CMP:   Recent Labs   Lab 04/29/24  1236      K 3.5   *   CO2 19*   *   BUN 23   CREATININE 1.0   CALCIUM 8.6*   PROT 6.0   ALBUMIN 2.7*   BILITOT 0.6   ALKPHOS 111   AST 12   ALT 8*   ANIONGAP 9       Significant Imaging: I have reviewed all pertinent imaging results/findings within the past 24 hours.  Assessment/Plan:     * Debility  Patient with Acute on chronic debility due to age-related physical debility.  Status post left femoral repair on OR 3/31 from a mechanical fall and subsequently discharged to rehab after hospitalization.  Patient progressing well postop with acute onset right hip/groin pain with difficulty ambulating secondary to symptoms.  Ambulates with walker at baseline.  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  Surgical changes are noted of the proximal left femur without femoroacetabular dislocation.  - MRI Pelvis and right hip pending   - Will notify orthopedic surgery if abnormal MRI imaging   - PT/OT        Diabetes mellitus with insulin therapy  Patient's FSGs are controlled on current medication regimen.  Last A1c reviewed-   Lab Results   Component Value Date     "HGBA1C 7.4 (H) 03/30/2024     Most recent fingerstick glucose reviewed- No results for input(s): "POCTGLUCOSE" in the last 24 hours.  Current correctional scale  Medium  Maintain anti-hyperglycemic dose as follows-   Antihyperglycemics (From admission, onward)      Start     Stop Route Frequency Ordered    04/29/24 2100  insulin detemir U-100 (Levemir) pen 4 Units         -- SubQ 2 times daily 04/29/24 1645    04/29/24 1745  insulin aspart U-100 pen 0-10 Units         -- SubQ Before meals & nightly PRN 04/29/24 1645          Hold Oral hypoglycemics while patient is in the hospital.    Hypomagnesemia  Patient has Abnormal Magnesium: hypomagnesemia. Will continue to monitor electrolytes closely. Will replace the affected electrolytes and repeat labs to be done after interventions completed. The patient's magnesium results have been reviewed and are listed below.  Recent Labs   Lab 04/29/24  1236   MG 1.5*        Hyperlipidemia  Continue home atorvastatin      Primary hypertension  Chronic, controlled. Latest blood pressure and vitals reviewed-     Temp:  [97.9 °F (36.6 °C)-98.4 °F (36.9 °C)]   Pulse:  []   Resp:  [20-24]   BP: (129-167)/(61-74)   SpO2:  [96 %-100 %] .   Home meds for hypertension were reviewed and noted below.   Hypertension Medications               chlorthalidone (HYGROTEN) 25 MG Tab Take 1 tablet (25 mg total) by mouth once daily.    irbesartan (AVAPRO) 300 MG tablet Take 1 tablet (300 mg total) by mouth every evening.    losartan (COZAAR) 50 MG tablet Take 50 mg by mouth.            While in the hospital, will manage blood pressure as follows; Continue home antihypertensive regimen    Will utilize p.r.n. blood pressure medication only if patient's blood pressure greater than 160/100 and she develops symptoms such as worsening chest pain or shortness of breath.      VTE Risk Mitigation (From admission, onward)           Ordered     enoxaparin injection 30 mg  Daily         04/29/24 1639     " IP VTE HIGH RISK PATIENT  Once         04/29/24 1643     Place sequential compression device  Until discontinued         04/29/24 1643     Place sequential compression device  Until discontinued         04/29/24 1639                               Pharmacist Renal Dose Adjustment Note    Sussy Costa is a 88 y.o. female being treated with the medication enoxaparin    Patient Data:    Vital Signs (Most Recent):  Temp: 97.9 °F (36.6 °C) (04/29/24 1546)  Pulse: 95 (04/29/24 1546)  Resp: 20 (04/29/24 1546)  BP: (!) 156/73 (04/29/24 1546)  SpO2: 99 % (04/29/24 1546) Vital Signs (72h Range):  Temp:  [97.9 °F (36.6 °C)-98.4 °F (36.9 °C)]   Pulse:  []   Resp:  [20-24]   BP: (134-167)/(65-74)   SpO2:  [97 %-99 %]      Recent Labs   Lab 04/29/24  1236   CREATININE 1.0     Serum creatinine: 1 mg/dL 04/29/24 1236  Estimated creatinine clearance: 31.8 mL/min    Enoxaparin dose will be changed to 30 mg SQ daily   Tyra Hopkins, Pharm.D.  PGY-1 Pharmacy Resident  [id number]      Albert Flores MD  Department of Hospital Medicine  [facility] - Emergency Dept

## 2024-05-01 LAB
ALBUMIN SERPL BCP-MCNC: 2.1 G/DL (ref 3.5–5.2)
ALP SERPL-CCNC: 119 U/L (ref 55–135)
ALT SERPL W/O P-5'-P-CCNC: 15 U/L (ref 10–44)
ANION GAP SERPL CALC-SCNC: 6 MMOL/L (ref 8–16)
AST SERPL-CCNC: 26 U/L (ref 10–40)
BASOPHILS # BLD AUTO: 0.05 K/UL (ref 0–0.2)
BASOPHILS NFR BLD: 0.6 % (ref 0–1.9)
BILIRUB SERPL-MCNC: 0.4 MG/DL (ref 0.1–1)
BUN SERPL-MCNC: 22 MG/DL (ref 8–23)
CALCIUM SERPL-MCNC: 8 MG/DL (ref 8.7–10.5)
CHLORIDE SERPL-SCNC: 113 MMOL/L (ref 95–110)
CO2 SERPL-SCNC: 22 MMOL/L (ref 23–29)
CREAT SERPL-MCNC: 1 MG/DL (ref 0.5–1.4)
DIFFERENTIAL METHOD BLD: ABNORMAL
EOSINOPHIL # BLD AUTO: 0.8 K/UL (ref 0–0.5)
EOSINOPHIL NFR BLD: 9.4 % (ref 0–8)
ERYTHROCYTE [DISTWIDTH] IN BLOOD BY AUTOMATED COUNT: 14.7 % (ref 11.5–14.5)
EST. GFR  (NO RACE VARIABLE): 54.5 ML/MIN/1.73 M^2
GLUCOSE SERPL-MCNC: 73 MG/DL (ref 70–110)
HCT VFR BLD AUTO: 24.1 % (ref 37–48.5)
HGB BLD-MCNC: 7.8 G/DL (ref 12–16)
IMM GRANULOCYTES # BLD AUTO: 0.03 K/UL (ref 0–0.04)
IMM GRANULOCYTES NFR BLD AUTO: 0.4 % (ref 0–0.5)
LYMPHOCYTES # BLD AUTO: 1.4 K/UL (ref 1–4.8)
LYMPHOCYTES NFR BLD: 17.2 % (ref 18–48)
MAGNESIUM SERPL-MCNC: 1.7 MG/DL (ref 1.6–2.6)
MCH RBC QN AUTO: 32.6 PG (ref 27–31)
MCHC RBC AUTO-ENTMCNC: 32.4 G/DL (ref 32–36)
MCV RBC AUTO: 101 FL (ref 82–98)
MONOCYTES # BLD AUTO: 1 K/UL (ref 0.3–1)
MONOCYTES NFR BLD: 12.4 % (ref 4–15)
NEUTROPHILS # BLD AUTO: 4.9 K/UL (ref 1.8–7.7)
NEUTROPHILS NFR BLD: 60 % (ref 38–73)
NRBC BLD-RTO: 0 /100 WBC
PHOSPHATE SERPL-MCNC: 2.2 MG/DL (ref 2.7–4.5)
PLATELET # BLD AUTO: 290 K/UL (ref 150–450)
PMV BLD AUTO: 11.5 FL (ref 9.2–12.9)
POCT GLUCOSE: 130 MG/DL (ref 70–110)
POCT GLUCOSE: 152 MG/DL (ref 70–110)
POCT GLUCOSE: 177 MG/DL (ref 70–110)
POCT GLUCOSE: 187 MG/DL (ref 70–110)
POCT GLUCOSE: 197 MG/DL (ref 70–110)
POCT GLUCOSE: 78 MG/DL (ref 70–110)
POTASSIUM SERPL-SCNC: 3.9 MMOL/L (ref 3.5–5.1)
PROT SERPL-MCNC: 5 G/DL (ref 6–8.4)
RBC # BLD AUTO: 2.39 M/UL (ref 4–5.4)
SODIUM SERPL-SCNC: 141 MMOL/L (ref 136–145)
WBC # BLD AUTO: 8.15 K/UL (ref 3.9–12.7)

## 2024-05-01 PROCEDURE — 80053 COMPREHEN METABOLIC PANEL: CPT | Performed by: STUDENT IN AN ORGANIZED HEALTH CARE EDUCATION/TRAINING PROGRAM

## 2024-05-01 PROCEDURE — 63600175 PHARM REV CODE 636 W HCPCS: Performed by: STUDENT IN AN ORGANIZED HEALTH CARE EDUCATION/TRAINING PROGRAM

## 2024-05-01 PROCEDURE — 25000003 PHARM REV CODE 250: Performed by: HOSPITALIST

## 2024-05-01 PROCEDURE — 97535 SELF CARE MNGMENT TRAINING: CPT

## 2024-05-01 PROCEDURE — 25000003 PHARM REV CODE 250: Performed by: STUDENT IN AN ORGANIZED HEALTH CARE EDUCATION/TRAINING PROGRAM

## 2024-05-01 PROCEDURE — 97166 OT EVAL MOD COMPLEX 45 MIN: CPT

## 2024-05-01 PROCEDURE — 36415 COLL VENOUS BLD VENIPUNCTURE: CPT | Performed by: STUDENT IN AN ORGANIZED HEALTH CARE EDUCATION/TRAINING PROGRAM

## 2024-05-01 PROCEDURE — 85025 COMPLETE CBC W/AUTO DIFF WBC: CPT | Performed by: STUDENT IN AN ORGANIZED HEALTH CARE EDUCATION/TRAINING PROGRAM

## 2024-05-01 PROCEDURE — 21400001 HC TELEMETRY ROOM

## 2024-05-01 PROCEDURE — 83735 ASSAY OF MAGNESIUM: CPT | Performed by: STUDENT IN AN ORGANIZED HEALTH CARE EDUCATION/TRAINING PROGRAM

## 2024-05-01 PROCEDURE — 97162 PT EVAL MOD COMPLEX 30 MIN: CPT

## 2024-05-01 PROCEDURE — 84100 ASSAY OF PHOSPHORUS: CPT | Performed by: STUDENT IN AN ORGANIZED HEALTH CARE EDUCATION/TRAINING PROGRAM

## 2024-05-01 PROCEDURE — 97116 GAIT TRAINING THERAPY: CPT

## 2024-05-01 RX ORDER — MORPHINE SULFATE 2 MG/ML
2 INJECTION, SOLUTION INTRAMUSCULAR; INTRAVENOUS EVERY 4 HOURS PRN
Status: DISCONTINUED | OUTPATIENT
Start: 2024-05-01 | End: 2024-05-02 | Stop reason: HOSPADM

## 2024-05-01 RX ORDER — SODIUM,POTASSIUM PHOSPHATES 280-250MG
2 POWDER IN PACKET (EA) ORAL ONCE
Status: COMPLETED | OUTPATIENT
Start: 2024-05-01 | End: 2024-05-01

## 2024-05-01 RX ORDER — HYDROCODONE BITARTRATE AND ACETAMINOPHEN 10; 325 MG/1; MG/1
1 TABLET ORAL EVERY 6 HOURS PRN
Status: DISCONTINUED | OUTPATIENT
Start: 2024-05-01 | End: 2024-05-02 | Stop reason: HOSPADM

## 2024-05-01 RX ADMIN — POTASSIUM & SODIUM PHOSPHATES POWDER PACK 280-160-250 MG 2 PACKET: 280-160-250 PACK at 08:05

## 2024-05-01 RX ADMIN — OXYCODONE HYDROCHLORIDE AND ACETAMINOPHEN 500 MG: 500 TABLET ORAL at 08:05

## 2024-05-01 RX ADMIN — METHOCARBAMOL 500 MG: 500 TABLET ORAL at 11:05

## 2024-05-01 RX ADMIN — PREGABALIN 25 MG: 25 CAPSULE ORAL at 08:05

## 2024-05-01 RX ADMIN — PANCRELIPASE 1 CAPSULE: 24000; 76000; 120000 CAPSULE, DELAYED RELEASE PELLETS ORAL at 11:05

## 2024-05-01 RX ADMIN — METHOCARBAMOL 500 MG: 500 TABLET ORAL at 05:05

## 2024-05-01 RX ADMIN — PANCRELIPASE 1 CAPSULE: 24000; 76000; 120000 CAPSULE, DELAYED RELEASE PELLETS ORAL at 05:05

## 2024-05-01 RX ADMIN — METHOCARBAMOL 500 MG: 500 TABLET ORAL at 06:05

## 2024-05-01 RX ADMIN — DOCUSATE SODIUM AND SENNOSIDES 1 TABLET: 8.6; 5 TABLET, FILM COATED ORAL at 08:05

## 2024-05-01 RX ADMIN — MONTELUKAST 10 MG: 10 TABLET, FILM COATED ORAL at 08:05

## 2024-05-01 RX ADMIN — CETIRIZINE HYDROCHLORIDE 5 MG: 5 TABLET, FILM COATED ORAL at 08:05

## 2024-05-01 RX ADMIN — ENOXAPARIN SODIUM 30 MG: 30 INJECTION SUBCUTANEOUS at 05:05

## 2024-05-01 RX ADMIN — INSULIN ASPART 2 UNITS: 100 INJECTION, SOLUTION INTRAVENOUS; SUBCUTANEOUS at 05:05

## 2024-05-01 RX ADMIN — ATORVASTATIN CALCIUM 20 MG: 20 TABLET, FILM COATED ORAL at 08:05

## 2024-05-01 RX ADMIN — HYDROCODONE BITARTRATE AND ACETAMINOPHEN 1 TABLET: 5; 325 TABLET ORAL at 11:05

## 2024-05-01 RX ADMIN — HYDROCODONE BITARTRATE AND ACETAMINOPHEN 1 TABLET: 5; 325 TABLET ORAL at 08:05

## 2024-05-01 RX ADMIN — LOSARTAN POTASSIUM 50 MG: 50 TABLET, FILM COATED ORAL at 08:05

## 2024-05-01 RX ADMIN — PANCRELIPASE 1 CAPSULE: 24000; 76000; 120000 CAPSULE, DELAYED RELEASE PELLETS ORAL at 06:05

## 2024-05-01 RX ADMIN — INSULIN DETEMIR 4 UNITS: 100 INJECTION, SOLUTION SUBCUTANEOUS at 08:05

## 2024-05-01 RX ADMIN — ACETAMINOPHEN 650 MG: 325 TABLET ORAL at 11:05

## 2024-05-01 RX ADMIN — INSULIN ASPART 2 UNITS: 100 INJECTION, SOLUTION INTRAVENOUS; SUBCUTANEOUS at 02:05

## 2024-05-01 NOTE — PLAN OF CARE
Problem: Adult Inpatient Plan of Care  Goal: Plan of Care Review  Outcome: Progressing     Problem: Diabetes Comorbidity  Goal: Blood Glucose Level Within Targeted Range  Outcome: Progressing     Problem: Acute Kidney Injury/Impairment  Goal: Improved Oral Intake  Outcome: Progressing     Problem: Wound  Goal: Optimal Coping  Outcome: Progressing     Problem: Skin Injury Risk Increased  Goal: Skin Health and Integrity  Outcome: Progressing

## 2024-05-01 NOTE — SUBJECTIVE & OBJECTIVE
Interval History:   No events overnight.  Patient with continued pain managed with multimodal pain medication.    Review of Systems   Constitutional:  Negative for activity change, appetite change, chills, diaphoresis, fatigue and fever.   HENT:  Negative for rhinorrhea and sore throat.    Respiratory:  Negative for cough, chest tightness and shortness of breath.    Cardiovascular:  Negative for chest pain and palpitations.   Gastrointestinal:  Negative for abdominal pain, constipation, diarrhea and nausea.   Endocrine: Negative for cold intolerance.   Genitourinary:  Negative for decreased urine volume and dysuria.   Musculoskeletal:  Positive for arthralgias (Bilateral hips, worse on right) and gait problem. Negative for back pain and myalgias.   Skin:  Negative for rash and wound.   Neurological:  Negative for dizziness, weakness, numbness and headaches.   Psychiatric/Behavioral:  Negative for agitation, behavioral problems and confusion.      Objective:     Vital Signs (Most Recent):  Temp: 97.6 °F (36.4 °C) (05/01/24 1605)  Pulse: 72 (05/01/24 1605)  Resp: 18 (05/01/24 1127)  BP: (!) 122/56 (05/01/24 1605)  SpO2: 98 % (05/01/24 1605) Vital Signs (24h Range):  Temp:  [97.6 °F (36.4 °C)-100.2 °F (37.9 °C)] 97.6 °F (36.4 °C)  Pulse:  [] 72  Resp:  [16-20] 18  SpO2:  [93 %-98 %] 98 %  BP: (119-145)/(55-66) 122/56     Weight: 56.1 kg (123 lb 10.9 oz)  Body mass index is 21.23 kg/m².    Intake/Output Summary (Last 24 hours) at 5/1/2024 1818  Last data filed at 5/1/2024 1807  Gross per 24 hour   Intake 300 ml   Output 850 ml   Net -550 ml      Physical Exam  Constitutional:       General: She is not in acute distress.     Appearance: Normal appearance.   HENT:      Head: Normocephalic and atraumatic.      Mouth/Throat:      Mouth: Mucous membranes are moist.   Eyes:      Extraocular Movements: Extraocular movements intact.      Conjunctiva/sclera: Conjunctivae normal.   Cardiovascular:      Rate and Rhythm:  "Normal rate and regular rhythm.   Pulmonary:      Effort: Pulmonary effort is normal. No respiratory distress.      Breath sounds: Normal breath sounds. No wheezing or rales.   Abdominal:      General: Abdomen is flat. Bowel sounds are normal.      Palpations: Abdomen is soft.      Tenderness: There is no abdominal tenderness. There is no guarding.   Musculoskeletal:         General: No swelling or tenderness (Outer hips).      Right lower leg: No edema.      Left lower leg: No edema.   Skin:     Findings: No lesion (Well-healing surgical scar left hip) or rash.   Neurological:      General: No focal deficit present.      Mental Status: She is alert and oriented to person, place, and time. Mental status is at baseline.      Motor: Weakness (Bilateral lower extremity) present.   Psychiatric:         Mood and Affect: Mood normal.         Behavior: Behavior normal.             Significant Labs:  CBC:  Recent Labs   Lab 04/30/24  0510 05/01/24  0531   WBC 9.77 8.15   HGB 7.1* 7.8*   HCT 22.9* 24.1*    290     CMP:  Recent Labs   Lab 04/30/24  0510 05/01/24  0531    141   K 3.2* 3.9   * 113*   CO2 20* 22*    73   BUN 22 22   CREATININE 1.0 1.0   CALCIUM 8.1* 8.0*   PROT 4.9* 5.0*   ALBUMIN 2.1* 2.1*   BILITOT 0.4 0.4   ALKPHOS 96 119   AST 11 26   ALT 6* 15   ANIONGAP 7* 6*     PTINR:  No results for input(s): "INR" in the last 48 hours.    Significant Procedures:   None  "

## 2024-05-01 NOTE — PLAN OF CARE
Markos ade - Med Surg ([facility])  Discharge Reassessment    Primary Care Provider: Leon Ramírez MD    Expected Discharge Date: 5/2/2024    Reassessment (most recent)       Discharge Reassessment - 05/01/24 1445          Discharge Reassessment    Assessment Type Discharge Planning Reassessment (P)      Did the patient's condition or plan change since previous assessment? Yes (P)      Discharge Plan discussed with: Patient (P)      Communicated ANTOINE with patient/caregiver Date not available/Unable to determine (P)      Discharge Plan A Home Health (P)      Discharge Plan B Home with family (P)      DME Needed Upon Discharge  none (P)      Transition of Care Barriers None (P)      Why the patient remains in the hospital Requires continued medical care (P)         Post-Acute Status    Post-Acute Authorization Home Health (P)      Home Health Status Referrals Sent (P)      Coverage [employer] a[facility] (P)      Discharge Delays None known at this time (P)                  CM spoke with pt in room.  She states she got therapy today, it was difficult but she was able to walk a little bit and is now sitting up in chair.  Pt states she got rehab after hip surgery, then HH, then started outpatient therapy.  However, last week she wasn't able to get up, so she came to [facility] and was admitted.  She states that since she's having such a difficult time getting up and around, she would like HH instead of outpatient therapy upon d/c.  She states she's not sure of name of HH agency, but was able to provide nurse name/number Olive Mirza [phone].  CM called Olive, who states that pt's agency is [facility].  CM sent referral to [facility] via CP.    Pt's dispo upon d/c is home,  will give her a ride home, no DME needs, has walker, w/c, and raised toilet seat.    DANISHA CookN, BS, RN, CCM

## 2024-05-01 NOTE — PLAN OF CARE
Problem: Physical Therapy  Goal: Physical Therapy Goal  Description: Goals to be met by:      Patient will increase functional independence with mobility by performin. Supine to sit with Modified [place]  2. Sit to supine with Modified [place]  3. Sit to stand transfer with Modified [place]  4. Gait  x 200 feet with Stand-by Assistance using Rolling Walker.     Outcome: Progressing   Evaluation completed, initiated plan of care.   Renee Schwartz, PT  2024

## 2024-05-01 NOTE — ASSESSMENT & PLAN NOTE
Patient with Acute on chronic debility due to age-related physical debility.  Status post left femoral repair on OR 3/31 from a mechanical fall and subsequently discharged to rehab after hospitalization.  Patient progressing well postop with acute onset right hip/groin pain with difficulty ambulating secondary to symptoms.  Ambulates with walker at baseline.  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  Surgical changes are noted of the proximal left femur without femoroacetabular dislocation.  - MRI Pelvis and right hip without significant findings.  Notable for degenerative changes of right hip and right acetabular joint  - Discussed case with Orthopedic surgery, no indication for surgery inpatient.  Outpatient follow up.  - PT/OT: JUVENTINO

## 2024-05-01 NOTE — PLAN OF CARE
Problem: Occupational Therapy  Goal: Occupational Therapy Goal  Description: Goals to be met by: 5/15/24     Patient will increase functional independence with ADLs by performing:    LE Dressing with Supervision.  Grooming while standing at sink with Supervision.  Supine to sit with Supervision.  Toilet transfer to toilet with Supervision.    Outcome: Progressing     Patient tolerated OT eval. Goals and POC established. See note for further details.

## 2024-05-01 NOTE — PT/OT/SLP EVAL
"Physical Therapy Evaluation  Co-evaluation with OT due to acuity of condition, level of skilled assist needed for assessment of safety with mobility.   Patient Name:  Sussy Costa   MRN:  [id number]    Recommendations:     Discharge Recommendations: Low Intensity Therapy   Discharge Equipment Recommendations: none   Barriers to discharge:  patient below functional baseline    Assessment:     Sussy Costa is a 88 y.o. female admitted with a medical diagnosis of Right hip pain.  She presents with the following impairments/functional limitations: weakness, impaired endurance, impaired self care skills, impaired functional mobility, pain, decreased lower extremity function, gait instability, decreased ROM. The patient's mobility is limited by sudden onset of acute R hip pain with ROM and Wbing. Patient needing increased assist for mobility due to hip pain. She ambulated 14'  with RW and contact guard assist.     Rehab Prognosis: Good; patient would benefit from acute skilled PT services to address these deficits and reach maximum level of function.    Recent Surgery: * No surgery found *      Plan:     During this hospitalization, patient to be seen 3 x/week to address the identified rehab impairments via gait training, therapeutic activities, therapeutic exercises, neuromuscular re-education and progress toward the following goals:    Plan of Care Expires:  05/31/24    Subjective     Chief Complaint: "I went to physical therapy on Thursday, the next day I couldn't stand up out of my chair my hip was hurting so bad"  Patient/Family Comments/goals: pain control, return to PLOF  Pain/Comfort:  Pain Rating 1: 8/10  Location - Side 1: Right  Location - Orientation 1: generalized  Location 1: hip  Pain Addressed 1: Reposition, Distraction  Pain Rating Post-Intervention 1: 9/10    Patients cultural, spiritual, Buddhism conflicts given the current situation: no    Living Environment:  The patient lives with her spouse in " a [facility], threshold to enter. WIS and T/S.   Prior to admission, patients level of function was modified independent.  Equipment used at home: walker, rolling, wheelchair, bedside commode.  DME owned (not currently used): none.  Upon discharge, patient will have assistance from spouse.    Objective:     Communicated with RN prior to session.  Patient found HOB elevated with telemetry, PureWick  upon PT entry to room.    General Precautions: Standard, fall  Orthopedic Precautions:N/A   Braces: N/A  Respiratory Status: Room air    Exams:    Cognitive Exam  Patient is A&O x4 and follows 100% of one -step commands    Fine Motor Coordination   -       WNL     Postural Exam Patient presented with the following abnormalities:    -       Rounded shoulders  -       Forward head  -       Kyphosis  -       Posterior pelvic tilt   Sensation    -       Light touch intact RAÚL LE   Skin Integrity/Edema     -       Skin integrity: visibly intact  -       Edema: None   R LE ROM Hip and knee ROM limited by pain, ankle WNL   R LE Strength 3+/5 hip flexion, 4-/5 knee ext/flex, and ankle DF/PF   L LE ROM WNL, L hip ROM increasing pain in R hip   L LE Strength  3+/5 hip flexion, 4/5 knee ext/flex, and ankle DF/PF            Functional Mobility:    Bed Mobility  Supine to Sit on the R side:  moderate assistance, assist to progress LE to edge of bed, trunk assist   Transfers Sit to Stand:  minimum assistance with RW from EOB   Gait  Gait Distance: 16 ft with RW  Assistance Level: contact guard assist   Description: step to with L LE leading, impaired sequencing with RW and stepping, kyphotic posture, short shuffling steps, antalgic gait          AM-PAC 6 CLICK MOBILITY  Total Score:15       Treatment & Education:  Patient educated on:  -role of therapy  -goals of session  -PT POC  -benefits of out of bed mobility and consequences of immobility  -calling for staff assist to mobilize safely  Patient agreeable to mobilize with therapy.       Gait training: cued for upright posture, reciprocal strides, cued to ambulate inside RW MOLLY, pacing for energy conservation , cued for sequencing with RW progression- cued to lead with R LE with stepping    Patient encouraged to ambulate, sit up in chair 3x/day to prevent deconditioning during hospitalization. Patient verbalized understanding and agreement to mobilize only with RN assist for safety.     Patient safe to ambulate with 1 person assist with RW.    Patient left  with OT doing ADLs at the sink  with all lines intact and OT present.    GOALS:   Multidisciplinary Problems       Physical Therapy Goals          Problem: Physical Therapy    Goal Priority Disciplines Outcome Goal Variances Interventions   Physical Therapy Goal     PT, PT/OT Progressing     Description: Goals to be met by:      Patient will increase functional independence with mobility by performin. Supine to sit with Modified [place]  2. Sit to supine with Modified [place]  3. Sit to stand transfer with Modified [place]  4. Gait  x 200 feet with Stand-by Assistance using Rolling Walker.                          History:     Past Medical History:   Diagnosis Date    Asthma     Cyst of pancreas     liver    Diabetes mellitus type II     Eczema of hand     Glaucoma     History of colon cancer 10/28/2022    Right sided colon cancer diagnosed in setting of LBO requiring urgent hemicolectomy 10/25/2022 with path showing pT4aN0 disease. CT chest 22 with possible lung metastases and MR liver with indeterminate liver lesions. 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 Patient elected for surveillance.       History of recurrent pneumonia 2020    Hyperlipidemia     Hypertension     Lichen planus     gums    Osteoporosis     Other chronic pancreatitis 2023    Pancreas cyst 2016    Pulmonary nodules     Spinal stenosis  of lumbar region 08/30/2018       Past Surgical History:   Procedure Laterality Date    BRONCHOSCOPY N/A 5/13/2022    Procedure: Bronchoscopy;  Surgeon: [facility] Diagnostic Provider;  [facility];  Service: Anesthesiology;  Laterality: N/A;    CATARACT EXTRACTION, BILATERAL      CHOLECYSTECTOMY      COLECTOMY, RIGHT Right 10/25/2022    Procedure: COLECTOMY, RIGHT;  Surgeon: Jass Holman MD;  [facility];  Service: Colon and Rectal;  Laterality: Right;    COLONOSCOPY N/A 6/26/2023    Procedure: COLONOSCOPY;  Surgeon: Jass Holman MD;  Location: [facility] ENDO (2ND FLR);  Service: Endoscopy;  Laterality: N/A;  2nd floor -lung disease  referral Dr [id number] portal/mailed-GT  6/20/23- Precall confirmed- [place]    ENDOSCOPIC ULTRASOUND OF UPPER GASTROINTESTINAL TRACT N/A 4/22/2022    Procedure: ULTRASOUND, UPPER GI TRACT, ENDOSCOPIC;  Surgeon: Max Rosado MD;  Location: [facility] ENDO (2ND FLR);  Service: Endoscopy;  Laterality: N/A;  urgent EUS (linear) for abnormal CT scan with dilated PD and increased cyst of pancreas cyst.  pap 44 mmHg  4/13:fully vaccinated. instructions via portal-[facility]    EPIDURAL STEROID INJECTION INTO LUMBAR SPINE N/A 11/8/2018    Procedure: Injection-steroid-epidural-lumbar L5-S1;  Surgeon: Nicci Osorio Jr., MD;  Location: [facility] PAIN MGT;  Service: Pain Management;  Laterality: N/A;    FUNCTIONAL ENDOSCOPIC SINUS SURGERY (FESS) USING COMPUTER-ASSISTED NAVIGATION N/A 6/17/2019    Procedure: FESS, USING COMPUTER-ASSISTED NAVIGATION;  Surgeon: Rosangela Isabel MD;  Location: [facility] OR;  Service: ENT;  Laterality: N/A;  video    HYSTERECTOMY      INTRAMEDULLARY RODDING OF FEMUR Left 3/31/2024    Procedure: INSERTION, INTRAMEDULLARY CAYDEN, FEMUR - Left,;  Surgeon: Nakul Walker MD;  [address]  Service: Orthopedics;  Laterality: Left;    nasal polyps         Time Tracking:     PT Received On: 05/01/24  PT Start Time: 0940     PT Stop  Time: 1000  PT Total Time (min): 20 min     Billable Minutes: Evaluation 10 and Gait Training 10      05/01/2024

## 2024-05-01 NOTE — PT/OT/SLP EVAL
Occupational Therapy  Co- Evaluation and Co-Treatment    Name: Sussy Costa  MRN: [id number]  Admitting Diagnosis: Right hip pain  Recent Surgery: * No surgery found *      Recommendations:     Discharge Recommendations: Low Intensity Therapy  Discharge Equipment Recommendations:  none  Barriers to discharge:  None    Assessment:     Sussy Costa is a 88 y.o. female with a medical diagnosis of Right hip pain.  She presents primarily limited by pain. Patient requiring light assistance for functional mobility and ADLs at current time. . Performance deficits affecting function: weakness, impaired endurance, impaired self care skills, impaired functional mobility, gait instability, impaired balance, decreased safety awareness, decreased lower extremity function, pain.  Patient currently demonstrates a need for low intensity therapy on a scheduled basis secondary to a decline in functional status due to injury     Rehab Prognosis: Good; patient would benefit from acute skilled OT services to address these deficits and reach maximum level of function.       Plan:     Patient to be seen 3 x/week to address the above listed problems via self-care/home management, therapeutic activities, therapeutic exercises, neuromuscular re-education  Plan of Care Expires: 05/15/24  Plan of Care Reviewed with: patient    Subjective     Chief Complaint: new onset hip pain, starting on Friday   Patient/Family Comments/goals: get better    Occupational Profile:  Living Environment: lives with their spouse in a single story home with 1 steps to enter with no handrails. Bathroom set up: walk in shower with Bath seat  Previous level of function: Patient reports they were independent with ADLs and IADLs prior to hospitalization; reports fear of showering since d/c IPR on 4/3/24  Roles and Routines: mother, wife  Equipment Used at Home: walker, rolling, wheelchair, bedside commode  Assistance upon Discharge: family    Pain/Comfort:  Pain  Rating 1: 8/10  Location - Side 1: Right  Location - Orientation 1: generalized  Location 1: hip  Pain Addressed 1: Reposition, Distraction  Pain Rating Post-Intervention 1: 9/10    Patients cultural, spiritual, Gnosticism conflicts given the current situation: no    Objective:   Partial Co-evaluation and treatment necessary due to patient's medical complexity requiring two skilled therapists for patient safety, activity tolerance, and appropriate progression towards functional goals.      Communicated with: nursing prior to session.  Patient found HOB elevated with telemetry, PureWick upon OT entry to room.    General Precautions: Standard, fall  Orthopedic Precautions: N/A  Braces: N/A  Respiratory Status: Room air    Occupational Performance:    Bed Mobility:    Patient completed Supine to Sit with moderate assistance    Functional Mobility/Transfers:  Patient completed Sit <> Stand Transfer with minimum assistance  with  rolling walker   Patient completed Toilet Transfer Step Transfer technique with minimum assistance with  rolling walker  Functional Mobility: patient ambulated to/from bathroom with RW and CGA    Activities of Daily Living:  Grooming: contact guard assistance to wash face and brush teeth in stance  Lower Body Dressing: maximal assistance to don socks  Toileting: contact guard assistance for clothing management    Cognitive/Visual Perceptual:  Cognitive/Psychosocial Skills:     -       Oriented to: Person, Place, Time, and Situation   -       Follows Commands/attention:Follows one-step commands  -       Communication: clear/fluent  -       Memory: No Deficits noted  -       Safety awareness/insight to disability: intact   -       Mood/Affect/Coping skills/emotional control: Appropriate to situation    Physical Exam:  Sensation:    -       Intact  Dominant hand:    -       R  Upper Extremity Range of Motion:     -       Right Upper Extremity: WFL  -       Left Upper Extremity: WFL  Upper Extremity  Strength:    -       Right Upper Extremity: WFL  -       Left Upper Extremity: WFL   Strength:    -       Right Upper Extremity: WFL  -       Left Upper Extremity: WFL  Fine Motor Coordination:    -       Intact    AMPAC 6 Click ADL:  AMPAC Total Score: 16    Treatment & Education:    Patient educated on:   -purpose of OT and OT POC  -facilitation and education on proper body mechanics, energy conservation, and safety  -importance of early mobility and out of bed activities with staff assist  -overall benefits of therapy     All questions answered within OT scope and to patient's satisfaction    Patient left up in chair with all lines intact, call button in reach, and nursing notified    GOALS:   Multidisciplinary Problems       Occupational Therapy Goals          Problem: Occupational Therapy    Goal Priority Disciplines Outcome Interventions   Occupational Therapy Goal     OT, PT/OT Progressing    Description: Goals to be met by: 5/15/24     Patient will increase functional independence with ADLs by performing:    LE Dressing with Supervision.  Grooming while standing at sink with Supervision.  Supine to sit with Supervision.  Toilet transfer to toilet with Supervision.                         History:     Past Medical History:   Diagnosis Date    Asthma     Cyst of pancreas     liver    Diabetes mellitus type II     Eczema of hand     Glaucoma     History of colon cancer 10/28/2022    Right sided colon cancer diagnosed in setting of LBO requiring urgent hemicolectomy 10/25/2022 with path showing pT4aN0 disease. CT chest 12/7/22 with possible lung metastases and MR liver with indeterminate liver lesions. 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 Patient elected for surveillance.       History of recurrent pneumonia 09/28/2020    Hyperlipidemia     Hypertension     Lichen planus     gums    Osteoporosis     Other chronic  pancreatitis 03/01/2023    Pancreas cyst 03/18/2016    Pulmonary nodules     Spinal stenosis of lumbar region 08/30/2018         Past Surgical History:   Procedure Laterality Date    BRONCHOSCOPY N/A 5/13/2022    Procedure: Bronchoscopy;  Surgeon: [facility] Diagnostic Provider;  [facility];  Service: Anesthesiology;  Laterality: N/A;    CATARACT EXTRACTION, BILATERAL      CHOLECYSTECTOMY      COLECTOMY, RIGHT Right 10/25/2022    Procedure: COLECTOMY, RIGHT;  Surgeon: Jass Holman MD;  Location: [facility] OR [facility] FLR;  Service: Colon and Rectal;  Laterality: Right;    COLONOSCOPY N/A 6/26/2023    Procedure: COLONOSCOPY;  Surgeon: Jass Holman MD;  Location: [facility] ENDO (2ND FLR);  Service: Endoscopy;  Laterality: N/A;  2nd floor -lung disease  referral Dr [id number] portal/mailed-GT  6/20/23- Precall confirmed- [place]    ENDOSCOPIC ULTRASOUND OF UPPER GASTROINTESTINAL TRACT N/A 4/22/2022    Procedure: ULTRASOUND, UPPER GI TRACT, ENDOSCOPIC;  Surgeon: Max Rosado MD;  Location: [facility] ENDO (2ND FLR);  Service: Endoscopy;  Laterality: N/A;  urgent EUS (linear) for abnormal CT scan with dilated PD and increased cyst of pancreas cyst.  pap 44 mmHg  4/13:fully vaccinated. instructions via portal-[facility]    EPIDURAL STEROID INJECTION INTO LUMBAR SPINE N/A 11/8/2018    Procedure: Injection-steroid-epidural-lumbar L5-S1;  Surgeon: Nicci Osorio Jr., MD;  Location: [facility] PAIN MGT;  Service: Pain Management;  Laterality: N/A;    FUNCTIONAL ENDOSCOPIC SINUS SURGERY (FESS) USING COMPUTER-ASSISTED NAVIGATION N/A 6/17/2019    Procedure: FESS, USING COMPUTER-ASSISTED NAVIGATION;  Surgeon: Rosangela Isabel MD;  Location: [facility] OR;  Service: ENT;  Laterality: N/A;  video    HYSTERECTOMY      INTRAMEDULLARY RODDING OF FEMUR Left 3/31/2024    Procedure: INSERTION, INTRAMEDULLARY CAYDEN, FEMUR - Left,;  Surgeon: Nakul Walker MD;  Location: [address];  Service: Orthopedics;  Laterality: Left;     nasal polyps         Time Tracking:     OT Date of Treatment: 05/01/24  OT Start Time: 0939  OT Stop Time: 1010  OT Total Time (min): 31 min    Billable Minutes:Evaluation 10  Self Care/Home Management 21 5/1/2024

## 2024-05-02 VITALS
DIASTOLIC BLOOD PRESSURE: 61 MMHG | OXYGEN SATURATION: 98 % | SYSTOLIC BLOOD PRESSURE: 131 MMHG | RESPIRATION RATE: 18 BRPM | HEIGHT: 64 IN | WEIGHT: 123.69 LBS | HEART RATE: 90 BPM | TEMPERATURE: 98 F | BODY MASS INDEX: 21.11 KG/M2

## 2024-05-02 LAB
ALBUMIN SERPL BCP-MCNC: 1.9 G/DL (ref 3.5–5.2)
ALP SERPL-CCNC: 102 U/L (ref 55–135)
ALT SERPL W/O P-5'-P-CCNC: 9 U/L (ref 10–44)
ANION GAP SERPL CALC-SCNC: 5 MMOL/L (ref 8–16)
AST SERPL-CCNC: 14 U/L (ref 10–40)
BASOPHILS # BLD AUTO: 0.06 K/UL (ref 0–0.2)
BASOPHILS NFR BLD: 0.9 % (ref 0–1.9)
BILIRUB SERPL-MCNC: 0.3 MG/DL (ref 0.1–1)
BUN SERPL-MCNC: 20 MG/DL (ref 8–23)
CALCIUM SERPL-MCNC: 7.3 MG/DL (ref 8.7–10.5)
CHLORIDE SERPL-SCNC: 112 MMOL/L (ref 95–110)
CO2 SERPL-SCNC: 21 MMOL/L (ref 23–29)
CREAT SERPL-MCNC: 0.9 MG/DL (ref 0.5–1.4)
DIFFERENTIAL METHOD BLD: ABNORMAL
EOSINOPHIL # BLD AUTO: 0.8 K/UL (ref 0–0.5)
EOSINOPHIL NFR BLD: 11.8 % (ref 0–8)
ERYTHROCYTE [DISTWIDTH] IN BLOOD BY AUTOMATED COUNT: 14.6 % (ref 11.5–14.5)
EST. GFR  (NO RACE VARIABLE): >60 ML/MIN/1.73 M^2
GLUCOSE SERPL-MCNC: 140 MG/DL (ref 70–110)
HCT VFR BLD AUTO: 23.5 % (ref 37–48.5)
HGB BLD-MCNC: 7.2 G/DL (ref 12–16)
IMM GRANULOCYTES # BLD AUTO: 0.03 K/UL (ref 0–0.04)
IMM GRANULOCYTES NFR BLD AUTO: 0.4 % (ref 0–0.5)
LYMPHOCYTES # BLD AUTO: 1.4 K/UL (ref 1–4.8)
LYMPHOCYTES NFR BLD: 20.8 % (ref 18–48)
MAGNESIUM SERPL-MCNC: 1.6 MG/DL (ref 1.6–2.6)
MCH RBC QN AUTO: 32.1 PG (ref 27–31)
MCHC RBC AUTO-ENTMCNC: 30.6 G/DL (ref 32–36)
MCV RBC AUTO: 105 FL (ref 82–98)
MONOCYTES # BLD AUTO: 0.9 K/UL (ref 0.3–1)
MONOCYTES NFR BLD: 12.6 % (ref 4–15)
NEUTROPHILS # BLD AUTO: 3.7 K/UL (ref 1.8–7.7)
NEUTROPHILS NFR BLD: 53.5 % (ref 38–73)
NRBC BLD-RTO: 0 /100 WBC
PHOSPHATE SERPL-MCNC: 2.5 MG/DL (ref 2.7–4.5)
PLATELET # BLD AUTO: 303 K/UL (ref 150–450)
PMV BLD AUTO: 11.3 FL (ref 9.2–12.9)
POCT GLUCOSE: 142 MG/DL (ref 70–110)
POCT GLUCOSE: 255 MG/DL (ref 70–110)
POTASSIUM SERPL-SCNC: 3.5 MMOL/L (ref 3.5–5.1)
PROT SERPL-MCNC: 4.7 G/DL (ref 6–8.4)
RBC # BLD AUTO: 2.24 M/UL (ref 4–5.4)
SODIUM SERPL-SCNC: 138 MMOL/L (ref 136–145)
WBC # BLD AUTO: 6.93 K/UL (ref 3.9–12.7)

## 2024-05-02 PROCEDURE — 63600175 PHARM REV CODE 636 W HCPCS: Performed by: STUDENT IN AN ORGANIZED HEALTH CARE EDUCATION/TRAINING PROGRAM

## 2024-05-02 PROCEDURE — 25000003 PHARM REV CODE 250: Performed by: HOSPITALIST

## 2024-05-02 PROCEDURE — 36415 COLL VENOUS BLD VENIPUNCTURE: CPT | Performed by: STUDENT IN AN ORGANIZED HEALTH CARE EDUCATION/TRAINING PROGRAM

## 2024-05-02 PROCEDURE — 80053 COMPREHEN METABOLIC PANEL: CPT | Performed by: STUDENT IN AN ORGANIZED HEALTH CARE EDUCATION/TRAINING PROGRAM

## 2024-05-02 PROCEDURE — 25000003 PHARM REV CODE 250: Performed by: STUDENT IN AN ORGANIZED HEALTH CARE EDUCATION/TRAINING PROGRAM

## 2024-05-02 PROCEDURE — 83735 ASSAY OF MAGNESIUM: CPT | Performed by: STUDENT IN AN ORGANIZED HEALTH CARE EDUCATION/TRAINING PROGRAM

## 2024-05-02 PROCEDURE — 85025 COMPLETE CBC W/AUTO DIFF WBC: CPT | Performed by: STUDENT IN AN ORGANIZED HEALTH CARE EDUCATION/TRAINING PROGRAM

## 2024-05-02 PROCEDURE — 84100 ASSAY OF PHOSPHORUS: CPT | Performed by: STUDENT IN AN ORGANIZED HEALTH CARE EDUCATION/TRAINING PROGRAM

## 2024-05-02 RX ORDER — DOXYCYCLINE 100 MG/1
100 CAPSULE ORAL EVERY 12 HOURS
Qty: 14 CAPSULE | Refills: 0 | Status: SHIPPED | OUTPATIENT
Start: 2024-05-02 | End: 2024-05-09

## 2024-05-02 RX ADMIN — PANCRELIPASE 1 CAPSULE: 24000; 76000; 120000 CAPSULE, DELAYED RELEASE PELLETS ORAL at 06:05

## 2024-05-02 RX ADMIN — METHOCARBAMOL 500 MG: 500 TABLET ORAL at 05:05

## 2024-05-02 RX ADMIN — DOCUSATE SODIUM AND SENNOSIDES 1 TABLET: 8.6; 5 TABLET, FILM COATED ORAL at 08:05

## 2024-05-02 RX ADMIN — METHOCARBAMOL 500 MG: 500 TABLET ORAL at 06:05

## 2024-05-02 RX ADMIN — PANCRELIPASE 1 CAPSULE: 24000; 76000; 120000 CAPSULE, DELAYED RELEASE PELLETS ORAL at 12:05

## 2024-05-02 RX ADMIN — LOSARTAN POTASSIUM 50 MG: 50 TABLET, FILM COATED ORAL at 08:05

## 2024-05-02 RX ADMIN — ENOXAPARIN SODIUM 30 MG: 30 INJECTION SUBCUTANEOUS at 04:05

## 2024-05-02 RX ADMIN — INSULIN ASPART 6 UNITS: 100 INJECTION, SOLUTION INTRAVENOUS; SUBCUTANEOUS at 12:05

## 2024-05-02 RX ADMIN — INSULIN DETEMIR 4 UNITS: 100 INJECTION, SOLUTION SUBCUTANEOUS at 08:05

## 2024-05-02 RX ADMIN — PANCRELIPASE 1 CAPSULE: 24000; 76000; 120000 CAPSULE, DELAYED RELEASE PELLETS ORAL at 04:05

## 2024-05-02 RX ADMIN — PREGABALIN 25 MG: 25 CAPSULE ORAL at 08:05

## 2024-05-02 RX ADMIN — HYDROCODONE BITARTRATE AND ACETAMINOPHEN 1 TABLET: 5; 325 TABLET ORAL at 04:05

## 2024-05-02 RX ADMIN — OXYCODONE HYDROCHLORIDE AND ACETAMINOPHEN 500 MG: 500 TABLET ORAL at 08:05

## 2024-05-02 RX ADMIN — INSULIN ASPART 1 UNITS: 100 INJECTION, SOLUTION INTRAVENOUS; SUBCUTANEOUS at 12:05

## 2024-05-02 RX ADMIN — METHOCARBAMOL 500 MG: 500 TABLET ORAL at 12:05

## 2024-05-02 NOTE — PLAN OF CARE
[facility] ([place] [address])      HOME HEALTH ORDERS  FACE TO FACE ENCOUNTER    Patient Name: Sussy Costa  YOB: 1935    PCP: Leon Ramírez MD   PCP Address: [address] [facility] / [place] [address]  PCP Phone Number: [phone]  PCP Fax: [phone]    Encounter Date: 4/29/24    Admit to Home Health    Diagnoses:  Active Hospital Problems    Diagnosis  POA    *Right hip pain [M25.551]  Yes    Hypomagnesemia [E83.42]  Yes    Diabetes mellitus with insulin therapy [E11.9, Z79.4]  Not Applicable    Hyperlipidemia [E78.5]  Yes    Primary hypertension [I10]  Yes     Home medications include chlorthalidone and irbesartan        Resolved Hospital Problems   No resolved problems to display.       Follow Up Appointments:  Future Appointments   Date Time Provider Department Center   5/3/2024 10:00 AM LAB, HEMON CANCER BLDG [facility] LAB HO [facility]   5/3/2024 11:00 AM [facility] BCC CT1 NOM CT BENI [facility]   5/8/2024  1:00 PM LAB, APPOINTMENT [facility] INTMED [facility] LAB IM [address] [facility]   5/8/2024  1:40 PM Nakul English MD [facility] HEMONC2 [facility]   5/15/2024 12:30 PM Alice Chavez PAKELSEA [facility] ORTHO [address]   5/15/2024  2:00 PM Heike Ye, GHISLAINE [facility] ENDODIA [address]   6/6/2024  2:00 PM Alen Campa DPM [facility] POD [address]   6/19/2024  1:20 PM Leon Ramírez MD [facility] IM [place]   6/26/2024  2:20 PM Rosangela Steel MD [facility] CHUNG [facility]   8/19/2024 11:45 AM INJECTION NOM AMB INF [facility]       Allergies:  Review of patient's allergies indicates:   Allergen Reactions    Aspirin Other (See Comments)     Asthma    TRIAD OF NASAL POLYPS, ASA  ALLERGY AND ASTHMA.        Aspirin Other (See Comments)    Nsaids (non-steroidal anti-inflammatory drug) Other (See Comments)     AVOID DUE TO TRIAD OF ASA ALLERGY, NASAL POLYPS,AND ASTHMA  AVOID DUE TO TRIAD OF ASA ALLERGY, NASAL POLYPS,AND ASTHMA    Penicillin      Other reaction(s): Rash    9/30/20: tolerates ceftriaxone     Penicillin g Other (See Comments)     Other reaction(s): Rash    9/30/20: tolerates ceftriaxone       Medications: Review discharge medications with patient and family and provide education.    Current Facility-Administered Medications   Medication Dose Route Frequency Provider Last Rate Last Admin    acetaminophen tablet 650 mg  650 mg Oral Q8H PRAlbert Powell MD   650 mg at 05/01/24 2320    albuterol sulfate nebulizer solution 2.5 mg  2.5 mg Nebulization Q4H PRN Albert Flores MD        ascorbic acid (vitamin C) tablet 500 mg  500 mg Oral Daily Albert Flores MD   500 mg at 05/02/24 0856    atorvastatin tablet 20 mg  20 mg Oral QHS Albert Flores MD   20 mg at 05/01/24 2047    bisacodyL suppository 10 mg  10 mg Rectal Daily PRAlbert Powell MD        calcium carbonate 200 mg calcium (500 mg) chewable tablet 500 mg  500 mg Oral TID PRN Albert Flores MD        cetirizine tablet 5 mg  5 mg Oral QHS Albert Flores MD   5 mg at 05/01/24 2047    dextrose 10% bolus 125 mL 125 mL  12.5 g Intravenous PRN Albert Flores MD        dextrose 10% bolus 250 mL 250 mL  25 g Intravenous PRN Albert Flores MD        enoxaparin injection 30 mg  30 mg Subcutaneous Daily Albert Flores MD   30 mg at 05/01/24 1726    glucagon (human recombinant) injection 1 mg  1 mg Intramuscular PRN Albert Flores MD        glucose chewable tablet 16 g  16 g Oral PRAlbert Powell MD        glucose chewable tablet 24 g  24 g Oral PRN Albert Flores MD        hydrALAZINE tablet 25 mg  25 mg Oral Q8H PRN Albert Flores MD        HYDROcodone-acetaminophen  mg per tablet 1 tablet  1 tablet Oral Q6H PRN Albert Flores MD        HYDROcodone-acetaminophen 5-325 mg per tablet 1 tablet  1 tablet Oral Q6H PRN Bijal Ba DO   1 tablet at 05/01/24 2046    insulin aspart U-100 pen 0-10 Units  0-10 Units Subcutaneous QID (AC + HS) PRN Albert Flores MD   6 Units at  05/02/24 1230    insulin detemir U-100 (Levemir) pen 4 Units  4 Units Subcutaneous BID Albert Flores MD   4 Units at 05/02/24 0853    lipase-protease-amylase 24,000-76,000-120,000 units capsule 1 capsule  1 capsule Oral TID AC Albert Flores MD   1 capsule at 05/02/24 1229    losartan tablet 50 mg  50 mg Oral Daily Albert Flores MD   50 mg at 05/02/24 0853    melatonin tablet 6 mg  6 mg Oral Nightly PRN Brad Conner DO        methocarbamoL tablet 500 mg  500 mg Oral Q6H Albert Flores MD   500 mg at 05/02/24 1229    montelukast tablet 10 mg  10 mg Oral QHS Albert Flores MD   10 mg at 05/01/24 2047    morphine injection 2 mg  2 mg Intravenous Q4H PRN Albert Flores MD        naloxone 0.4 mg/mL injection 0.02 mg  0.02 mg Intravenous PRN Albert Flores MD        ondansetron disintegrating tablet 8 mg  8 mg Oral Q8H PRN Albert Flores MD        polyethylene glycol packet 17 g  17 g Oral Daily PRN Albert Flores MD        pregabalin capsule 25 mg  25 mg Oral BID Albert Flores MD   25 mg at 05/02/24 0853    prochlorperazine injection Soln 5 mg  5 mg Intravenous Q6H PRN Albert Flores MD        senna-docusate 8.6-50 mg per tablet 1 tablet  1 tablet Oral BID Albert Flores MD   1 tablet at 05/02/24 0853    sodium chloride 0.9% flush 10 mL  10 mL Intravenous PRN Brad Conner DO        sodium chloride 0.9% flush 10 mL  10 mL Intravenous Q12H PRN Albert Flores MD         Current Discharge Medication List        START taking these medications    Details   doxycycline (VIBRAMYCIN) 100 MG Cap Take 1 capsule (100 mg total) by mouth every 12 (twelve) hours. for 7 days  Qty: 14 capsule, Refills: 0           CONTINUE these medications which have NOT CHANGED    Details   acetaminophen (TYLENOL) 500 MG tablet Take 2 tablets (1,000 mg total) by mouth every 8 (eight) hours.  Refills: 0      albuterol (PROVENTIL) 2.5 mg /3 mL (0.083 %) nebulizer solution Take  3 mLs (2.5 mg total) by nebulization every 6 (six) hours as needed for Wheezing or Shortness of Breath. Rescue  Qty: 60 each, Refills: 0    Associated Diagnoses: Moderate persistent asthma with exacerbation      ascorbic acid, vitamin C, (VITAMIN C) 500 MG tablet Take 500 mg by mouth once daily.      atorvastatin (LIPITOR) 20 MG tablet Take 1 tablet (20 mg total) by mouth every evening.  Qty: 90 tablet, Refills: 1      calcium carbonate (TUMS) 200 mg calcium (500 mg) chewable tablet Take 1 tablet (500 mg total) by mouth 3 (three) times daily as needed for Heartburn.      cetirizine (ZYRTEC) 5 MG tablet Take 1 tablet (5 mg total) by mouth every evening.  Refills: 0      denosumab (PROLIA) 60 mg/mL Syrg Inject 1 mL (60 mg total) into the skin every 6 (six) months. Hold at rehab    Associated Diagnoses: Closed intertrochanteric fracture of hip, left, initial encounter      fluticasone-umeclidin-vilanter (TRELEGY ELLIPTA) 200-62.5-25 mcg inhaler Inhale 1 puff into the lungs once daily. Hold at [facility] if not on formulary  Qty: 3 each, Refills: 4    Associated Diagnoses: Moderate persistent asthma without complication      !! insulin aspart U-100 (NOVOLOG FLEXPEN U-100 INSULIN) 100 unit/mL (3 mL) InPn pen Inject 5 Units into the skin 3 (three) times daily.  Refills: 0      !! insulin aspart U-100 (NOVOLOG) 100 unit/mL (3 mL) InPn pen Inject 0-5 Units into the skin before meals and at bedtime as needed (Hyperglycemia).  Refills: 0      insulin detemir U-100, Levemir, 100 unit/mL (3 mL) SubQ InPn pen Inject 4 Units into the skin 2 (two) times daily.  Refills: 0      latanoprost 0.005 % ophthalmic solution Inject into the eye. 1 Drops Ophthalmic Every evening      lipase-protease-amylase 24,000-76,000-120,000 units (CREON) 24,000-76,000 -120,000 unit capsule Take 2 capsules with meals orally and 1 capsule with snacks.  Qty: 300 capsule, Refills: 11    Associated Diagnoses: Chronic pancreatitis, unspecified pancreatitis type       losartan (COZAAR) 50 MG tablet Take 50 mg by mouth.      magnesium oxide (MAG-OX) 400 mg (241.3 mg magnesium) tablet Take 400 mg by mouth once daily.      melatonin (MELATIN) 3 mg tablet Take 2 tablets (6 mg total) by mouth nightly as needed for Insomnia.  Refills: 0      methocarbamoL (ROBAXIN) 500 MG Tab Take 1 tablet (500 mg total) by mouth every 6 (six) hours.  Qty: 40 tablet, Refills: 0      montelukast (SINGULAIR) 10 mg tablet Take 1 tablet (10 mg total) by mouth every evening.  Qty: 30 tablet, Refills: 6    Comments: This prescription was filled on 12/27/2022. Any refills authorized will be placed on file.      olopatadine (PATANOL) 0.1 % ophthalmic solution Place 1 drop into both eyes 2 (two) times daily as needed for Allergies. 1 Drops Ophthalmic Twice a day      ondansetron (ZOFRAN-ODT) 8 MG TbDL Take 1 tablet (8 mg total) by mouth every 6 (six) hours as needed.      polyethylene glycol (GLYCOLAX) 17 gram PwPk Take 17 g by mouth once daily.  Refills: 0      pregabalin (LYRICA) 25 MG capsule Take 1 capsule (25 mg total) by mouth 2 (two) times daily.  Qty: 60 capsule, Refills: 0    Associated Diagnoses: Closed 2-part intertrochanteric fracture of left femur with routine healing, subsequent encounter      senna-docusate 8.6-50 mg (PERICOLACE) 8.6-50 mg per tablet Take 1 tablet by mouth 2 (two) times daily.      vitamin D (VITAMIN D3) 1000 units Tab Take 1,000 Units by mouth once daily.       !! - Potential duplicate medications found. Please discuss with provider.        STOP taking these medications       apixaban (ELIQUIS) 2.5 mg Tab Comments:   Reason for Stopping:         chlorthalidone (HYGROTEN) 25 MG Tab Comments:   Reason for Stopping:         fluticasone furoate-vilanteroL (BREO) 200-25 mcg/dose DsDv diskus inhaler Comments:   Reason for Stopping:         irbesartan (AVAPRO) 300 MG tablet Comments:   Reason for Stopping:                 I have seen and examined this patient within the last 30  days. My clinical findings that support the need for the home health skilled services and home bound status are the following:no   Weakness/numbness causing balance and gait disturbance due to Weakness/Debility making it taxing to leave home.     Diet:   no change    Labs:  Report Lab results to PCP.    Referrals/ Consults  Physical Therapy to evaluate and treat. Evaluate for home safety and equipment needs; Establish/upgrade home exercise program. Perform / instruct on therapeutic exercises, gait training, transfer training, and Range of Motion.  Occupational Therapy to evaluate and treat. Evaluate home environment for safety and equipment needs. Perform/Instruct on transfers, ADL training, ROM, and therapeutic exercises.   to evaluate for community resources/long-range planning.  Aide to provide assistance with personal care, ADLs, and vital signs.    Activities:   activity as tolerated    Nursing:   Agency to admit patient within 24 hours of hospital discharge unless specified on physician order or at patient request    SN to complete comprehensive assessment including routine vital signs. Instruct on disease process and s/s of complications to report to MD. Review/verify medication list sent home with the patient at time of discharge  and instruct patient/caregiver as needed. Frequency may be adjusted depending on start of care date.     Skilled nurse to perform up to 3 visits PRN for symptoms related to diagnosis    Notify MD if SBP > 160 or < 90; DBP > 90 or < 50; HR > 120 or < 50; Temp > 101; O2 < 88%; Other:       Ok to schedule additional visits based on staff availability and patient request on consecutive days within the home health episode.    When multiple disciplines ordered:    Start of Care occurs on Sunday - Wednesday schedule remaining discipline evaluations as ordered on separate consecutive days following the start of care.    Thursday SOC -schedule subsequent evaluations Friday and  Monday the following week.     Friday - Saturday SOC - schedule subsequent discipline evaluations on consecutive days starting Monday of the following week.    For all post-discharge communication and subsequent orders please contact patient's primary care physician. If unable to reach primary care physician or do not receive response within 30 minutes, please contact pcp for clinical staff order clarification    Miscellaneous   Diabetic Care:   SN to perform and educate Diabetic management with blood glucose monitoring:, Fingerstick blood sugar AC and HS, and Report CBG < 60 or > 350 to physician.    Home Health Aide:  Nursing Three times weekly, Physical Therapy Three times weekly, Occupational Therapy Three times weekly, Medical Social Work Weekly, and Home Health Aide Weekly    I certify that this patient is confined to her home and needs intermittent skilled nursing care, physical therapy, and occupational therapy.

## 2024-05-02 NOTE — DISCHARGE SUMMARY
[facility] - Med Surg ([place] [address])  [facility] Medicine  Discharge Summary      Patient Name: Sussy Costa  MRN: [id number]  MIRIAM: [id number]  Patient Class: IP- Inpatient  Admission Date: 4/29/2024  Hospital Length of Stay: 3 days  Discharge Date and Time: No discharge date for patient encounter.  Attending Physician: Rosa Maria Nava DO   Discharging Provider: Rosa Maria Nava DO  Primary Care Provider: Leon Ramírez MD  [facility] Medicine Team: [facility] HOSP MED A Rosa Maria Nava DO  Primary Care Team: [facility] MED A    HPI:   Ms. Sussy Costa is a  88 y.o. female with recent left intertrochanteric fracture s/p surgery (dc 4/3/24 to [facility]), Type 2 DM, CKD stage 3, HTN, pancreatic insufficiency, Asthma, Afib presents with 1 day of right hip pain.  Patient states that she underwent inpatient rehab and was at home with acute onset of right hip pain with difficulty bearing weight.  Denies any recent falls, trauma, and increased weakness prior to onset of symptoms.  Notes pain at her right groin that radiates down to her right calf.  Denies fevers, nausea, dyspnea, constipation, dysuria, and headache.  Patient presented to severity of symptoms and difficulty ambulating.    In the ED, /73, HR 95, on room air, and afebrile.  Labs notable for creatinine 1.0, WBC 12, hemoglobin 8.5, CK 28.  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  CT abdomen/pelvis with osteopenia with noted degenerative changes of the right for more acetabular joint, appropriate postsurgical changes of left femur, during the change so sacroiliac joints and spine.  Ultrasound bilateral lower extremities negative for DVT.  Patient given cefazolin, Tylenol, and 2 g Mag in the ED.    * No surgery found *      Hospital Course:   Patient underwent MRI pelvis and right hip that revealed no fractures, however noted degenerative disease.  Discussed case with Orthopedic surgery who recommended no surgical intervention.  PT/OT consulted.  Pain management with multimodal  medications. Ortho was contacted and recommended outpatient follow up. Concern for cellulitis, course of doxy on discharge.     Physical Exam  Constitutional:       General: She is not in acute distress.     Appearance: Normal appearance.   HENT:      Head: Normocephalic and atraumatic.      Mouth/Throat:      Mouth: Mucous membranes are moist.   Eyes:      Extraocular Movements: Extraocular movements intact.      Conjunctiva/sclera: Conjunctivae normal.   Cardiovascular:      Rate and Rhythm: Normal rate and regular rhythm.   Pulmonary:      Effort: Pulmonary effort is normal. No respiratory distress.      Breath sounds: Normal breath sounds. No wheezing or rales.   Abdominal:      General: Abdomen is flat. Bowel sounds are normal.      Palpations: Abdomen is soft.      Tenderness: There is no abdominal tenderness. There is no guarding.   Musculoskeletal:         General: No swelling or tenderness (Outer hips).      Right lower leg: No edema.      Left lower leg: No edema.   Skin:     Findings: No lesion (Well-healing surgical scar left hip) or rash.   Neurological:      General: No focal deficit present.      Mental Status: She is alert and oriented to person, place, and time. Mental status is at baseline.      Motor: Weakness (Bilateral lower extremity) present.   Psychiatric:         Mood and Affect: Mood normal.         Behavior: Behavior normal.      Goals of Care Treatment Preferences:  Code Status: Full Code      Consults:     No new Assessment & Plan notes have been filed under this hospital service since the last note was generated.  Service: Hospital Medicine    Final Active Diagnoses:    Diagnosis Date Noted POA    PRINCIPAL PROBLEM:  Right hip pain [M25.551] 11/02/2022 Yes    Hypomagnesemia [E83.42] 03/31/2024 Yes    Diabetes mellitus with insulin therapy [E11.9, Z79.4] 09/01/2023 Not Applicable    Hyperlipidemia [E78.5]  Yes    Primary hypertension [I10] 09/24/2012 Yes      Problems Resolved During  this Admission:       Discharged Condition: stable    Disposition: Home or Self Care    Follow Up:   Follow-up Information       Leon Ramírez MD Follow up in 3 day(s).    Specialty: Internal Medicine  Contact information:  [address] [facility]  [place] [address]  [phone]                           Patient Instructions:      Ambulatory referral/consult to Orthopedics   Standing Status: Future   Referral Priority: Routine Referral Type: Consultation   Requested Specialty: Orthopedic Surgery   Number of Visits Requested: 1     Notify your health care provider if you experience any of the following:  increased confusion or weakness     Notify your health care provider if you experience any of the following:  persistent dizziness, light-headedness, or visual disturbances     Notify your health care provider if you experience any of the following:  worsening rash     Notify your health care provider if you experience any of the following:  severe persistent headache     Notify your health care provider if you experience any of the following:  difficulty breathing or increased cough     Notify your health care provider if you experience any of the following:  redness, tenderness, or signs of infection (pain, swelling, redness, odor or green/yellow discharge around incision site)     Notify your health care provider if you experience any of the following:  severe uncontrolled pain     Notify your health care provider if you experience any of the following:  persistent nausea and vomiting or diarrhea     Notify your health care provider if you experience any of the following:  temperature >100.4     Activity as tolerated       Significant Diagnostic Studies: Labs: All labs within the past 24 hours have been reviewed    Pending Diagnostic Studies:       None           Medications:  Reconciled Home Medications:      Medication List        START taking these medications      doxycycline 100 MG Cap  Commonly known as:  VIBRAMYCIN  Take 1 capsule (100 mg total) by mouth every 12 (twelve) hours. for 7 days            CONTINUE taking these medications      acetaminophen 500 MG tablet  Commonly known as: TYLENOL  Take 2 tablets (1,000 mg total) by mouth every 8 (eight) hours.     albuterol 2.5 mg /3 mL (0.083 %) nebulizer solution  Commonly known as: PROVENTIL  Take 3 mLs (2.5 mg total) by nebulization every 6 (six) hours as needed for Wheezing or Shortness of Breath. Rescue     ascorbic acid (vitamin C) 500 MG tablet  Commonly known as: VITAMIN C  Take 500 mg by mouth once daily.     atorvastatin 20 MG tablet  Commonly known as: LIPITOR  Take 1 tablet (20 mg total) by mouth every evening.     calcium carbonate 200 mg calcium (500 mg) chewable tablet  Commonly known as: TUMS  Take 1 tablet (500 mg total) by mouth 3 (three) times daily as needed for Heartburn.     cetirizine 5 MG tablet  Commonly known as: ZYRTEC  Take 1 tablet (5 mg total) by mouth every evening.     CREON 24,000-76,000 -120,000 unit capsule  Generic drug: lipase-protease-amylase 24,000-76,000-120,000 units  Take 2 capsules with meals orally and 1 capsule with snacks.     * insulin aspart U-100 100 unit/mL (3 mL) Inpn pen  Commonly known as: NovoLOG Flexpen U-100 Insulin  Inject 5 Units into the skin 3 (three) times daily.     * insulin aspart U-100 100 unit/mL (3 mL) Inpn pen  Commonly known as: NovoLOG  Inject 0-5 Units into the skin before meals and at bedtime as needed (Hyperglycemia).     insulin detemir U-100 (Levemir) 100 unit/mL (3 mL) Inpn pen  Inject 4 Units into the skin 2 (two) times daily.     latanoprost 0.005 % ophthalmic solution  Inject into the eye. 1 Drops Ophthalmic Every evening     losartan 50 MG tablet  Commonly known as: COZAAR  Take 50 mg by mouth.     magnesium oxide 400 mg (241.3 mg magnesium) tablet  Commonly known as: MAG-OX  Take 400 mg by mouth once daily.     melatonin 3 mg tablet  Commonly known as: MELATIN  Take 2 tablets (6 mg total)  by mouth nightly as needed for Insomnia.     methocarbamoL 500 MG Tab  Commonly known as: ROBAXIN  Take 1 tablet (500 mg total) by mouth every 6 (six) hours.     montelukast 10 mg tablet  Commonly known as: SINGULAIR  Take 1 tablet (10 mg total) by mouth every evening.     olopatadine 0.1 % ophthalmic solution  Commonly known as: PATANOL  Place 1 drop into both eyes 2 (two) times daily as needed for Allergies. 1 Drops Ophthalmic Twice a day     ondansetron 8 MG Tbdl  Commonly known as: ZOFRAN-ODT  Take 1 tablet (8 mg total) by mouth every 6 (six) hours as needed.     pregabalin 25 MG capsule  Commonly known as: LYRICA  Take 1 capsule (25 mg total) by mouth 2 (two) times daily.     PROLIA 60 mg/mL Syrg  Generic drug: denosumab  Inject 1 mL (60 mg total) into the skin every 6 (six) months. Hold at [facility]     TRELEGY ELLIPTA 200-62.5-25 mcg inhaler  Generic drug: fluticasone-umeclidin-vilanter  Inhale 1 puff into the lungs once daily. Hold at [facility] if not on formulary     vitamin D 1000 units Tab  Commonly known as: VITAMIN D3  Take 1,000 Units by mouth once daily.           * This list has 2 medication(s) that are the same as other medications prescribed for you. Read the directions carefully, and ask your doctor or other care provider to review them with you.                STOP taking these medications      apixaban 2.5 mg Tab  Commonly known as: ELIQUIS     chlorthalidone 25 MG Tab  Commonly known as: HYGROTEN     fluticasone furoate-vilanteroL 200-25 mcg/dose Dsdv diskus inhaler  Commonly known as: BREO     irbesartan 300 MG tablet  Commonly known as: AVAPRO            ASK your doctor about these medications      polyethylene glycol 17 gram Pwpk  Commonly known as: GLYCOLAX  Take 17 g by mouth once daily.     senna-docusate 8.6-50 mg 8.6-50 mg per tablet  Commonly known as: PERICOLACE  Take 1 tablet by mouth 2 (two) times daily.              Indwelling Lines/Drains at time of discharge:   Lines/Drains/Airways        None                   Time spent on the discharge of patient: 37 minutes         Rosa Maria Nava DO  Department of Hospital Medicine  [facility] Surg ([place])

## 2024-05-02 NOTE — HOSPITAL COURSE
Patient underwent MRI pelvis and right hip that revealed no fractures, however noted degenerative disease.  Discussed case with Orthopedic surgery who recommended no surgical intervention.  PT/OT consulted.  Pain management with multimodal medications. Ortho was contacted and recommended outpatient follow up. Concern for cellulitis, course of doxy on discharge.     Physical Exam  Constitutional:       General: She is not in acute distress.     Appearance: Normal appearance.   HENT:      Head: Normocephalic and atraumatic.      Mouth/Throat:      Mouth: Mucous membranes are moist.   Eyes:      Extraocular Movements: Extraocular movements intact.      Conjunctiva/sclera: Conjunctivae normal.   Cardiovascular:      Rate and Rhythm: Normal rate and regular rhythm.   Pulmonary:      Effort: Pulmonary effort is normal. No respiratory distress.      Breath sounds: Normal breath sounds. No wheezing or rales.   Abdominal:      General: Abdomen is flat. Bowel sounds are normal.      Palpations: Abdomen is soft.      Tenderness: There is no abdominal tenderness. There is no guarding.   Musculoskeletal:         General: No swelling or tenderness (Outer hips).      Right lower leg: No edema.      Left lower leg: No edema.   Skin:     Findings: No lesion (Well-healing surgical scar left hip) or rash.   Neurological:      General: No focal deficit present.      Mental Status: She is alert and oriented to person, place, and time. Mental status is at baseline.      Motor: Weakness (Bilateral lower extremity) present.   Psychiatric:         Mood and Affect: Mood normal.         Behavior: Behavior normal.

## 2024-05-02 NOTE — PLAN OF CARE
Problem: Adult Inpatient Plan of Care  Goal: Plan of Care Review  Outcome: Met  Goal: Patient-Specific Goal (Individualized)  Outcome: Met  Goal: Absence of Hospital-Acquired Illness or Injury  Outcome: Met     Problem: Diabetes Comorbidity  Goal: Blood Glucose Level Within Targeted Range  Outcome: Met     Problem: Acute Kidney Injury/Impairment  Goal: Fluid and Electrolyte Balance  Outcome: Met     Problem: Wound  Goal: Optimal Coping  Outcome: Met  Goal: Optimal Functional Ability  Outcome: Met  Goal: Absence of Infection Signs and Symptoms  Outcome: Met  Goal: Optimal Pain Control and Function  Outcome: Met     Problem: Fall Injury Risk  Goal: Absence of Fall and Fall-Related Injury  Outcome: Met     Problem: Skin Injury Risk Increased  Goal: Skin Health and Integrity  Outcome: Met   Pt being discharged home per MD orders. VSS, pt afebrile and no c/o pain at this time. Reviewed discharge instructions, follow-up's and meds with pt. PIV removed, gauze/tape placed to site. All personal belongings packed and with pt at bedside. Ride to be provided by pt's son.

## 2024-05-02 NOTE — PLAN OF CARE
Per CP, [facility] is willing to accept pt.    3:10 PM  CM sent HH orders to [facility]  via CP.  CM called [facility] [phone], informed that HH orders were sent, pt being d/c'd today.    3:18 PM  CM discussed d/c with pt.  Instructed in d/c process; pt v/u.  Pt will be going home with HH, son in law will give her a ride home.  Pt denies DME needs.    Pt c/o she doesn't understand what is going on with her pain, why she's still having it; she would like to speak with MD.  CM messaged Dr. Nava, requested that he speak with pt.    DANISHA CookN, BS, RN, CCM

## 2024-05-03 ENCOUNTER — TELEPHONE (OUTPATIENT)
Dept: ORTHOPEDICS | Facility: CLINIC | Age: 89
End: 2024-05-03
Payer: MEDICARE

## 2024-05-03 ENCOUNTER — TELEPHONE (OUTPATIENT)
Dept: HEMATOLOGY/ONCOLOGY | Facility: CLINIC | Age: 89
End: 2024-05-03
Payer: MEDICARE

## 2024-05-03 NOTE — PLAN OF CARE
[facility] - Med Surg ([facility])  Discharge Final Note    Primary Care Provider: Leon Ramírez MD    Expected Discharge Date: 5/2/2024    Final Discharge Note (most recent)       Final Note - 05/03/24 0821          Final Note    Assessment Type Final Discharge Note (P)      Anticipated Discharge Disposition Home-Health Care Svc (P)         Post-Acute Status    Post-Acute Authorization Home Health (P)      Home Health Status Set-up Complete/Auth obtained (P)      Coverage [employer] (P)      Discharge Delays None known at this time (P)                      Important Message from Medicare             Contact Info       Leon Ramírez MD   Specialty: Internal Medicine   Relationship: PCP - General    [address] [facility] [address]   Phone: [phone]       Next Steps: Follow up in 3 day(s)        Pt d/c'd with Vonnie Grigsby, BSN, BS, RN, CCM

## 2024-05-03 NOTE — TELEPHONE ENCOUNTER
----- Message from Alice Chavez PA-C sent at 5/3/2024 12:13 PM CDT -----  Kami,   Please call patient and see what the other issues is that she needs to come in for  Please  Thanks,   Alice  ----- Message -----  From: Sharyn Piña MA  Sent: 5/3/2024  11:45 AM CDT  To: Alice Chavez PA-C      ----- Message -----  From: Andria Duran  Sent: 5/3/2024  11:14 AM CDT  To: Scott Jenkins Staff    Sussy Costa calling regarding Appointment Access. PT was discharged from the hospital yesterday for Right hip pain from referral/ history of left femur/hip surgery with TEREZA Chavez. PT is scheduled for 05/15/24 for a post-op of the left femur and wanted to be scheduled for the the new issue on the same day with TEREZA Chavez, please contact PT to inform and assist with this request, [phone]

## 2024-05-03 NOTE — TELEPHONE ENCOUNTER
----- Message from Lizeth Li sent at 5/3/2024 11:18 AM CDT -----  Regarding: Appt  Contact: Pt  [phone]            Current Appt date: 05/03/24    Type of Appt:  CT     Physician:  Nakul English MD    Reason for rescheduling:  Would like CT appt  reschedule for afternoon  but need it prior to appt set for 05/08/24     Caller:   Sussy      Contact Preference:   [phone]

## 2024-05-06 ENCOUNTER — HOSPITAL ENCOUNTER (OUTPATIENT)
Dept: RADIOLOGY | Facility: HOSPITAL | Age: 89
Discharge: HOME | End: 2024-05-06
Attending: HOSPITALIST
Payer: MEDICARE

## 2024-05-06 DIAGNOSIS — Z85.038 HISTORY OF COLON CANCER: ICD-10-CM

## 2024-05-06 PROCEDURE — 71250 CT THORAX DX C-: CPT | Mod: 26,,, | Performed by: RADIOLOGY

## 2024-05-06 PROCEDURE — 71250 CT THORAX DX C-: CPT | Mod: TC

## 2024-05-08 ENCOUNTER — OFFICE VISIT (OUTPATIENT)
Dept: HEMATOLOGY/ONCOLOGY | Facility: CLINIC | Age: 89
End: 2024-05-08
Payer: MEDICARE

## 2024-05-08 ENCOUNTER — LAB VISIT (OUTPATIENT)
Dept: LAB | Facility: HOSPITAL | Age: 89
End: 2024-05-08
Payer: MEDICARE

## 2024-05-08 VITALS
SYSTOLIC BLOOD PRESSURE: 145 MMHG | DIASTOLIC BLOOD PRESSURE: 60 MMHG | HEART RATE: 94 BPM | BODY MASS INDEX: 21.64 KG/M2 | HEIGHT: 64 IN | WEIGHT: 126.75 LBS

## 2024-05-08 DIAGNOSIS — E78.2 MIXED HYPERLIPIDEMIA: ICD-10-CM

## 2024-05-08 DIAGNOSIS — R80.9 TYPE 2 DIABETES MELLITUS WITH MICROALBUMINURIA, WITHOUT LONG-TERM CURRENT USE OF INSULIN: ICD-10-CM

## 2024-05-08 DIAGNOSIS — E11.29 TYPE 2 DIABETES MELLITUS WITH MICROALBUMINURIA, WITHOUT LONG-TERM CURRENT USE OF INSULIN: ICD-10-CM

## 2024-05-08 DIAGNOSIS — D62 ACUTE BLOOD LOSS ANEMIA: ICD-10-CM

## 2024-05-08 DIAGNOSIS — I10 PRIMARY HYPERTENSION: ICD-10-CM

## 2024-05-08 DIAGNOSIS — I48.0 PAROXYSMAL ATRIAL FIBRILLATION: ICD-10-CM

## 2024-05-08 DIAGNOSIS — R60.9 EDEMA, UNSPECIFIED TYPE: ICD-10-CM

## 2024-05-08 DIAGNOSIS — J90 PLEURAL EFFUSION: ICD-10-CM

## 2024-05-08 DIAGNOSIS — Z85.038 HISTORY OF COLON CANCER: Primary | ICD-10-CM

## 2024-05-08 DIAGNOSIS — M81.0 SENILE OSTEOPOROSIS: ICD-10-CM

## 2024-05-08 PROBLEM — C18.9 MALIGNANT NEOPLASM OF COLON, UNSPECIFIED PART OF COLON: Status: RESOLVED | Noted: 2024-04-19 | Resolved: 2024-05-08

## 2024-05-08 PROBLEM — Z79.4 DIABETES MELLITUS WITH INSULIN THERAPY: Status: RESOLVED | Noted: 2023-09-01 | Resolved: 2024-05-08

## 2024-05-08 PROBLEM — E11.9 DIABETES MELLITUS WITH INSULIN THERAPY: Status: RESOLVED | Noted: 2023-09-01 | Resolved: 2024-05-08

## 2024-05-08 LAB
ANION GAP SERPL CALC-SCNC: 8 MMOL/L (ref 8–16)
BUN SERPL-MCNC: 19 MG/DL (ref 8–23)
CALCIUM SERPL-MCNC: 8.9 MG/DL (ref 8.7–10.5)
CHLORIDE SERPL-SCNC: 114 MMOL/L (ref 95–110)
CO2 SERPL-SCNC: 21 MMOL/L (ref 23–29)
CREAT SERPL-MCNC: 1 MG/DL (ref 0.5–1.4)
EST. GFR  (NO RACE VARIABLE): 54.5 ML/MIN/1.73 M^2
ESTIMATED AVG GLUCOSE: 123 MG/DL (ref 68–131)
GLUCOSE SERPL-MCNC: 101 MG/DL (ref 70–110)
HBA1C MFR BLD: 5.9 % (ref 4–5.6)
POTASSIUM SERPL-SCNC: 3.8 MMOL/L (ref 3.5–5.1)
SODIUM SERPL-SCNC: 143 MMOL/L (ref 136–145)

## 2024-05-08 PROCEDURE — 99214 OFFICE O/P EST MOD 30 MIN: CPT | Mod: PBBFAC | Performed by: HOSPITALIST

## 2024-05-08 PROCEDURE — 83036 HEMOGLOBIN GLYCOSYLATED A1C: CPT | Performed by: NURSE PRACTITIONER

## 2024-05-08 PROCEDURE — 99215 OFFICE O/P EST HI 40 MIN: CPT | Mod: S$PBB,,, | Performed by: HOSPITALIST

## 2024-05-08 PROCEDURE — 80048 BASIC METABOLIC PNL TOTAL CA: CPT | Performed by: NURSE PRACTITIONER

## 2024-05-08 PROCEDURE — 99999 PR PBB SHADOW E&M-EST. PATIENT-LVL IV: CPT | Mod: PBBFAC,,, | Performed by: HOSPITALIST

## 2024-05-08 PROCEDURE — 36415 COLL VENOUS BLD VENIPUNCTURE: CPT | Performed by: NURSE PRACTITIONER

## 2024-05-08 RX ORDER — LOSARTAN POTASSIUM 50 MG/1
50 TABLET ORAL DAILY
Qty: 30 TABLET | Refills: 5 | Status: SHIPPED | OUTPATIENT
Start: 2024-05-08

## 2024-05-08 RX ORDER — FUROSEMIDE 20 MG/1
20 TABLET ORAL 2 TIMES DAILY
Qty: 60 TABLET | Refills: 11 | Status: SHIPPED | OUTPATIENT
Start: 2024-05-08 | End: 2025-05-08

## 2024-05-08 NOTE — PROGRESS NOTES
MEDICAL ONCOLOGY FOLLOW-UP VISIT.     Best Contact Phone Number(s): [phone] (home)      Cancer/Stage/TNM:    Cancer Staging   History of colon cancer  Staging form: Colon and Rectum, AJCC 8th Edition  - Clinical: Stage IIB (cT4a, cN0, cM0) - Signed by Nakul English MD on 2/3/2023       Reason for visit:  Stage II CRC on surveillance    HPI: Sussy Costa is a 88 y.o. female with asthma, recurrent PNA and hypoxic respiratory failure, and DM2 who was hospitalized 10/2022 for large bowel obstruction. 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  She deferred adjuvant chemotherapy for her high risk stage II CRC.  She presents to medical oncology clinic for routine surveillance.    Interval History:     Left hip fx 03/31/24 requiring ORIF. Returned home from SNF 4/12/24. Briefly on low dose apixaban for DVT ppx. Now stopped. Also stopped her chlorthalidone and irbesartan during recent hosptialization. Has progressive edema since surgery, generally L>R. Doppler US 4/29/24 engative for DVT. No SOB or cough. Sleeping in recliner since surgery; no clear orthopnea. No CP or palptiations. Takes pregabalin for pain primarily in the left thigh. Has also had pain in the right thigh and hip recently.  Finishing course of doxycyline for presumed cellutlitis. Appetite is good. Weight is up. No N/V and reports normal bowel movements. No SOB or cough. Has ongoing anemia. Taking iron supplement every other day. Most recent iron studies 02/2024 normal.         Oncology History   History of colon cancer   10/25/2022 Surgery    Right hemicolectomy    Moderately differentiated MMR proficient adenocarcinoma invading into the visceral peritoneum with associated PNI, LVI, and high tumor budding. Negative margins. 0/18 LN. [id number]     10/25/2022  Imaging Significant Findings    CT a/p with contrast:     1. Circumferential wall thickening of the short segment of ascending colon and apple-core appearance with pericolic fat stranding and free fluid, resulting in significant mass effect and dilatation of the cecum up to measuring 10 cm.  Suspect obstructing ascending colon neoplasm.    2. Diffuse gastric wall thickening and enhancement suggesting gastritis.  3. Stable innumerable pancreatic hypodense lesion.  4. Pulmonary nodules similar in size and number in comparison prior.  Metastasis not excluded.  5. Multiple hypodense lesions in the liver and a few in the spleen as well, similar to prior.  Metastasis not excluded.       12/7/2022 Imaging Significant Findings    CT chest:    1. Following resolution of left inter lobar and lower lobe bronchi mucous plugging, there is resolution of previous left lower lobe atelectasis.  However, there are many bilateral subcentimeter pulmonary nodules in both lower lobes which appear increased since a CT of the abdomen and pelvis 10/25/2022 in which there is partial imaging of the chest base.  There is a new 1.4 cm right lower lobe nodule.  2. Interval development of small volume right-sided pleural effusion.  This report was flagged in Epic as abnormal.     12/19/2022 Imaging Significant Findings    MR abdomen without evidence of intrabdominal malignancy. Ongoing chronic pancreatitis     1/24/2023 Biopsy    Biopsy of right lower lube nodule : Typical carcinoid. No mitoses noted.     2/15/2023 Notable Event      Seen by rad onc (Dr. Zamorano) 02/15/23 regarding the carcinoid tumor in the lung- imaging felt consistent with an indolent carcinoid tumor over the last decade with more recent inflammatory nodule. Plan for ongoing surveillance.     5/1/2023 Imaging Significant Findings    CT torso: Improvement in the 1.4cm RLL nodule. This nodule was not biopsied, and was though possible more inflammatory in nature. Other  subcentimeter nodules remain stable. No evidence of disease progression. No other evidence of recurrent or persistent colon cancer.     6/26/2023 Procedure    Colonoscopy  Impression:      - Patent side-to-side ileo-colonic anastomosis,                          characterized by healthy appearing mucosa.                          - Diverticulosis in the sigmoid colon.                          - Internal hemorrhoids.                          - No specimens collected.       Imaging Significant Findings    CT CAP  Impression:  1. Postoperative change prior right hemicolectomy without CT evidence of residual/recurrent disease.  2. Numerous scattered ground-glass and solid pulmonary nodules, not significantly changed.  No appreciable new or enlarging pulmonary nodules.  3. Cystic lesions within the pancreas with persistent dilatation of the main pancreatic duct, not significantly changed.     4/2024 Imaging Significant Findings    CT Chest 5/6/24  1. Redemonstration numerous bilateral pulmonary nodules as above which appear grossly stable.  2. Interval increase in size of right pleural effusion and small left pleural effusion.  3. 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  4. Linear opacities in the left lower lobe extending from the hilum to the pleura and in the right middle lobe, likely subsegmental atelectasis versus scarring.  5. Additional findings as above.    CT a/p 4/29/24  1. Large amount of stool throughout the colon suggests constipation, correlation is advised.  2. Findings suggest hepatic steatosis, correlation with LFTs recommended.  3. Stable configuration of the pancreas noting likely sequela of chronic pancreatitis and ductal dilation.  No interval inflammation about the pancreas.  Correlation with pancreatic enzymes as warranted.  4. The urinary bladder is distended, correlation with any history of outlet obstruction or urinary retention.  5. Stable  "pulmonary nodules, please see exam 11/18/2023.            Physical Exam:   BP (!) 145/60 (BP Location: Left arm, Patient Position: Sitting, BP Method: Medium (Automatic))   Pulse 94   Ht 5' 4" (1.626 m)   Wt 57.5 kg (126 lb 12.2 oz)   BMI 21.76 kg/m²      ECOG Performance Status: (foot note - ECOG PS provided by Eastern Cooperative Oncology Group) 1 - Symptomatic but completely ambulatory    Physical Exam  Constitutional:       General: She is not in acute distress.     Appearance: She is normal weight.   HENT:      Head: Normocephalic.      Mouth/Throat:      Mouth: Mucous membranes are moist.      Pharynx: Oropharynx is clear.   Eyes:      General: No scleral icterus.     Conjunctiva/sclera: Conjunctivae normal.      Pupils: Pupils are equal, round, and reactive to light.   Cardiovascular:      Rate and Rhythm: Normal rate and regular rhythm.      Heart sounds: No murmur heard.  Pulmonary:      Effort: Pulmonary effort is normal. No respiratory distress.      Breath sounds: Normal breath sounds.   Abdominal:      General: There is no distension.      Palpations: Abdomen is soft.   Musculoskeletal:         General: Normal range of motion.      Cervical back: Normal range of motion and neck supple.      Right lower leg: No edema.      Left lower leg: No edema.   Lymphadenopathy:      Cervical: No cervical adenopathy.   Skin:     General: Skin is warm.      Coloration: Skin is not jaundiced.   Neurological:      General: No focal deficit present.      Mental Status: She is alert and oriented to person, place, and time.      Motor: No weakness.   Psychiatric:         Mood and Affect: Mood normal.         Behavior: Behavior normal.         Thought Content: Thought content normal.           Labs:   Lab Results   Component Value Date     05/06/2024    K 3.9 05/06/2024    CREATININE 0.9 05/06/2024    WBC 9.27 05/06/2024    HGB 7.7 (L) 05/06/2024     (H) 05/06/2024    AST 22 05/06/2024    ALT 14 05/06/2024 "    ALKPHOS 109 05/06/2024    BILITOT 0.3 05/06/2024          Imaging:     CT Chest Without Contrast  Narrative: EXAMINATION:  CT CHEST WITHOUT CONTRAST    CLINICAL HISTORY:  Colon cancer, non-metastatic, staging; Personal history of other malignant neoplasm of large intestine    TECHNIQUE:  Low dose axial images, sagittal and coronal reformations were obtained from the thoracic inlet to the lung bases. Contrast was not administered.    COMPARISON:  CT chest abdomen pelvis 11/06/2023, chest x-ray 03/30/2024, CT abdomen pelvis 04/29/2024    FINDINGS:  Chest wall and lower neck: No axillary or supraclavicular lymphadenopathy.Visualized thyroid gland is unremarkable.    Heart and mediastinum: Several subcentimeter mediastinal nodes, none of which meet size criteria for adenopathy.  No hilar adenopathy.  Heart is normal size.Small volume pericardial fluid.  Multivessel coronary artery calcific atherosclerosis.  Calcification of the aortic annulus.  Main pulmonary artery is normal size.  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    Aorta: Left-sided aortic arch.Non-aneurysmal. Extensiveatheromatous disease in the aorta.    Lungs and pleura: Airways are patent. Unchanged 7 mm and 8 mm solid nodules in the right lower lobe (series 4 image 383, 389).  Numerous bilateral solid and ground-glass nodules throughout both lungs grossly unchanged in number, size, and distribution.  Interval increase in size of right-sided pleural effusion.  Interval development of trace left pleural effusion.  Linear opacities in the left lower lobe extending from the hilum to the pleura and in the right middle lobe.    Esophagus and upper abdomen: Small hiatal hernia.  Unchanged simple cyst in the right hepatic lobe.  Atrophic pancreas with scattered calcifications, similar to prior.  See CT abdomen pelvis dated 04/29/2024 for further details.    Bones: Age-appropriate degenerative change. No  acute fracture or suspicious osseous lesion.  Impression: 1. Redemonstration numerous bilateral pulmonary nodules as above which appear grossly stable.  2. Interval increase in size of right pleural effusion and small left pleural effusion.  3. 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  4. Linear opacities in the left lower lobe extending from the hilum to the pleura and in the right middle lobe, likely subsegmental atelectasis versus scarring.  5. Additional findings as above.    Electronically signed by resident: Kristian Ye  Date:    05/06/2024  Time:    15:25    Electronically signed by: Ministerio Fonseca  Date:    05/06/2024  Time:    16:25            Diagnoses:       1. History of colon cancer    2. Edema, unspecified type    3. Pleural effusion    4. Paroxysmal atrial fibrillation    5. Type 2 diabetes mellitus with microalbuminuria, without long-term current use of insulin    6. Senile osteoporosis    7. Primary hypertension    8. Mixed hyperlipidemia    9. Acute blood loss anemia                      Assessment and Plan:     1. History of colon cancer  Overview:  Right sided colon cancer diagnosed in setting of LBO requiring urgent hemicolectomy 10/25/2022 with path showing pT4aN0 disease. CT chest 12/7/22 with possible lung metastases and MR liver with indeterminate liver lesions. 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 Patient elected for surveillance.      Assessment & Plan:  Remains RICKY on most recent imaging. Does have some ongoing anemia of unclear etiology but no signficant iron deficieny 02/2024 and had normal colnoscopy last year. Will monitor conservatively. More limited by recent hip fracture and profound volume overload. Will start lasix 20mg daily and refer to cardiology.  - Repeat CBC, CMP, CEA and CT torso 6 months for CRC surveillance  - No plan for  further endoscopic surveillance given age  - Repeat iron studies in 4 weeks.    Orders:  -     CBC Oncology; Standing  -     Comprehensive Metabolic Panel; Standing  -     CEA; Standing    2. Edema, unspecified type  -     furosemide (LASIX) 20 MG tablet; Take 1 tablet (20 mg total) by mouth 2 (two) times daily.  Dispense: 60 tablet; Refill: 11  -     Ambulatory referral/consult to Cardiology; Future; Expected date: 05/15/2024    3. Pleural effusion  -     furosemide (LASIX) 20 MG tablet; Take 1 tablet (20 mg total) by mouth 2 (two) times daily.  Dispense: 60 tablet; Refill: 11    4. Paroxysmal atrial fibrillation    5. Type 2 diabetes mellitus with microalbuminuria, without long-term current use of insulin  Overview:  Home regimen includes levemir and Novolog.     Orders:  -     Ambulatory referral/consult to Cardiology; Future; Expected date: 05/15/2024    6. Senile osteoporosis  Assessment & Plan:  - Continues on Prolia q6 months along with Ca/D      7. Primary hypertension  Overview:  Home medications include losartan    Assessment & Plan:  - Recently stopped chlorthalidone and irbesartan  - Now on losartan    Orders:  -     losartan (COZAAR) 50 MG tablet; Take 1 tablet (50 mg total) by mouth once daily.  Dispense: 30 tablet; Refill: 5    8. Mixed hyperlipidemia  Assessment & Plan:  - Home mediactiosn include lipitor      9. Acute blood loss anemia  -     Ferritin; Future; Expected date: 05/08/2024  -     Iron and TIBC; Future; Expected date: 05/08/2024                       Route Chart for Scheduling    Med Onc Chart Routing      Follow up with physician 4 weeks.   Follow up with DOMONIQUE    Infusion scheduling note    Injection scheduling note    Labs CBC and CMP   Scheduling:  Preferred lab:  Lab interval:     Imaging    Pharmacy appointment    Other referrals                    Therapy Plan Information  Medications  denosumab (PROLIA) injection 60 mg  60 mg, Subcutaneous, Every 26 weeks       Nakul English  MD  Hematology/Oncology  [facility]

## 2024-05-08 NOTE — PROGRESS NOTES
Subjective:      Patient ID: Sussy Costa is a 88 y.o. female.    Chief Complaint:  Follow-up    History of Present Illness  Sussy Costa is here for follow up of DM and Osteoporosis.  Previously seen by me 1/2024.      Hospital Admission 4/2024  She is s/p fall with left hip pain, plain films show left femoral intertrochanteric fracture. Ortho consulted. To OR 3/31 with Dr Walker with IM nail with WBAT.     With regards to Diabetes:     Latest Reference Range & Units 03/08/22 10:13   Glucose 70 - 110 mg/dL 207 (H)   C-Peptide 0.78 - 5.19 ng/mL 0.77 (L)   (H): Data is abnormally high  (L): Data is abnormally low  DE: 8/2021  Known complications:  DKA Denies   RN Denies  Eye Exam: 10/2023  PN Yes  Podiatry: 5/2023  Nephropathy Denies  CAD Denies  Reports history of pancreatitis - on Creon.     Diet/Exercise: reports she had been eating more during the holidays   Eats 3 meals a day.   Snacks : occasionally before bed  Drinks : water, soft drinks (diet or regular)   Exercise - tries to stay active   Recent illness, injury, steroids: hip fracture - surgery     Current regimen:  Levemir 7 units in AM and 5 units in the PM   Novolog 4-5-4 units before meals   Add correction scale if needed.   Blood sugar 180 to 230 add 1 units   Blood sugar 231 to 280 add 2 units   Blood sugar greater than 281 add 3 units     Reports compliance.     Other medications tried:  None.     Glucose Monitor:   4 times a day testing  Log reviewed: log provided and reviewed, scanned in media tab            Hypoglycemia:  Rare.  Knows how to correct with 15 grams of carbs- juice, coke, or a peppermint.       Diabetes Management Status    Hemoglobin A1C   Date Value Ref Range Status   05/08/2024 5.9 (H) 4.0 - 5.6 % Final     Comment:     ADA Screening Guidelines:  5.7-6.4%  Consistent with prediabetes  >or=6.5%  Consistent with diabetes    High levels of fetal hemoglobin interfere with the HbA1C  assay. Heterozygous hemoglobin variants  (HbS, HgC, etc)do  not significantly interfere with this assay.   However, presence of multiple variants may affect accuracy.     03/30/2024 7.4 (H) 4.0 - 5.6 % Final     Comment:     ADA Screening Guidelines:  5.7-6.4%  Consistent with prediabetes  >or=6.5%  Consistent with diabetes    High levels of fetal hemoglobin interfere with the HbA1C  assay. Heterozygous hemoglobin variants (HbS, HgC, etc)do  not significantly interfere with this assay.   However, presence of multiple variants may affect accuracy.     01/03/2024 7.9 (H) 4.0 - 5.6 % Final     Comment:     ADA Screening Guidelines:  5.7-6.4%  Consistent with prediabetes  >or=6.5%  Consistent with diabetes    High levels of fetal hemoglobin interfere with the HbA1C  assay. Heterozygous hemoglobin variants (HbS, HgC, etc)do  not significantly interfere with this assay.   However, presence of multiple variants may affect accuracy.         Statin: Taking  ACE/ARB: Taking  Screening or Prevention Patient's value Goal Complete/Controlled?   HgA1C Testing and Control   Lab Results   Component Value Date    HGBA1C 5.9 (H) 05/08/2024      Annually/Less than 8% Yes     Lipid profile : 09/19/2023 Annually Yes     LDL control Lab Results   Component Value Date    LDLCALC 62.8 (L) 09/19/2023    Annually/Less than 100 mg/dl  Yes     Nephropathy screening Lab Results   Component Value Date    LABMICR 31.0 04/22/2024     Lab Results   Component Value Date    PROTEINUA Trace (A) 04/29/2024    Annually Yes     Blood pressure BP Readings from Last 1 Encounters:   05/15/24 135/85    Less than 140/90 No     Dilated retinal exam : 04/03/2023 Annually No     Foot exam   Most Recent Foot Exam Date: Not Found Annually Yes       With regards to Osteoporosis:     First diagnosed with osteoporosis in 2007.    BMD:   11/7/2023  Impression:  *Osteoporosis on treatment with Denosumab.  *Fracture risk is very high due to calculated 10 year risk of hip fracture >4.5% (FRAX)  *Compared with  previous DXA, BMD at the lumbar spine has remained stable, and BMD at the total hip has declined by 8.2%.  RECOMMENDATIONS:  *Daily calcium intake 1200 mg, dietary sources preferred; Vitamin D 800-1000  *Weight bearing exercise and fall precautions.  *Given very high fracture risk, and significant decline in the hip BMD on denosumab would consider a 12 month treatment course of romosozumab followed by resumption of denosumab.  Depending on contraindications.  *Repeat BMD in 2 years  *If not recently completed, would recommend lateral thoracic and lumbar x-rays looking for prevalent fractures.    Medications:   Actonel 8/2018 - 12/2021  She was given first dose of Prolia in 2/2018 and developed joint pains and lower ext edema so it was decided that she would not receive another dose of Prolia. We decided against Reclast for the same reason and instead restarted her on an oral bisphosphonate which she has tolerated without side effects. After her last visit 12/2021 we decided to stop the oral bisphosphonate and try Prolia again. She received Prolia  in 1/2022 without any side effects.  Last Dose: 2/2024    Calcium intake: dietary sources   Vit D intake: OTC Vit D3 1000iu daily     Weight bearing exercise: walking   Falls: Denies  Fractures:   4/2024 - fractured hip   Significant height loss (>2 inches): ~ 1/2  inch     Family history: Denies    Menopause: 56y.o.  No HRT.    Tobacco Use: Denies  Alcohol Use: Denies  Glucocorticoid History: Prednisone over the years for asthma.   Anticoagulant Use: Denies  GERD/PPI Use: Denies  History of Malabsorption: Yes  Antiseizure Medications: Denies  History of Thyroid Disease: Denies  Kidney Stones/ Kidney Disease: Denies  Personal history of kidney stones: Denies  Family history of kidney stones: Denies  Family history of bone disease or fracture: Denies      Lab Results   Component Value Date    CALCIUM 8.9 05/08/2024    PHOS 2.5 (L) 05/02/2024     Vit D, 25-Hydroxy   Date  "Value Ref Range Status   03/30/2024 27 (L) 30 - 96 ng/mL Final     Comment:     Vitamin D deficiency.........<10 ng/mL                              Vitamin D insufficiency......10-29 ng/mL       Vitamin D sufficiency........> or equal to 30 ng/mL  Vitamin D toxicity............>100 ng/mL         Review of Systems  As above    Objective:   Physical Exam  Vitals reviewed.   Cardiovascular:      Rate and Rhythm: Normal rate.      Comments: No edema present  Pulmonary:      Effort: Pulmonary effort is normal.   Abdominal:      Palpations: Abdomen is soft.         Visit Vitals  /85   Pulse 77   Ht 5' 5" (1.651 m)   Wt 54.1 kg (119 lb 4.3 oz)   SpO2 95%   BMI 19.85 kg/m²               Body mass index is 19.85 kg/m².    Lab Review:   Lab Results   Component Value Date    HGBA1C 5.9 (H) 05/08/2024    HGBA1C 7.4 (H) 03/30/2024    HGBA1C 7.9 (H) 01/03/2024       Lab Results   Component Value Date    CHOL 124 09/19/2023    HDL 53 09/19/2023    LDLCALC 62.8 (L) 09/19/2023    TRIG 41 09/19/2023    CHOLHDL 42.7 09/19/2023     Lab Results   Component Value Date     05/08/2024    K 3.8 05/08/2024     (H) 05/08/2024    CO2 21 (L) 05/08/2024     05/08/2024    BUN 19 05/08/2024    CREATININE 1.0 05/08/2024    CALCIUM 8.9 05/08/2024    PROT 5.7 (L) 05/06/2024    ALBUMIN 2.6 (L) 05/06/2024    BILITOT 0.3 05/06/2024    ALKPHOS 109 05/06/2024    AST 22 05/06/2024    ALT 14 05/06/2024    ANIONGAP 8 05/08/2024    ESTGFRAFRICA >60.0 07/11/2022    EGFRNONAA >60.0 07/11/2022    TSH 3.743 09/19/2023     Vit D, 25-Hydroxy   Date Value Ref Range Status   03/30/2024 27 (L) 30 - 96 ng/mL Final     Comment:     Vitamin D deficiency.........<10 ng/mL                              Vitamin D insufficiency......10-29 ng/mL       Vitamin D sufficiency........> or equal to 30 ng/mL  Vitamin D toxicity............>100 ng/mL       Assessment and Plan     1. Type 2 diabetes mellitus with microalbuminuria, without long-term current use " of insulin  insulin glargine U-100, Lantus, (LANTUS SOLOSTAR U-100 INSULIN) 100 unit/mL (3 mL) InPn pen    Hemoglobin A1C    CANCELED: Comprehensive Metabolic Panel      2. Senile osteoporosis  Renal Function Panel      3. Vitamin D insufficiency  Vitamin D          Type 2 diabetes mellitus with microalbuminuria, without long-term current use of insulin  -- Labs prior to follow up.  -- History of chronic pancreatitis. Low C peptide - manage as T1DM.  -- Brittle diabetic.  -- A1c goal < or = 8%.  -- Medications discussed:  Insulin   -- Reviewed logs/CGM:  Instructed to send glucose logs in 7-14 days.  Reach out to me sooner for any glucose <80 or consistently >200.  Not interested in personal CGM.  -- Medication Changes:   Levemir (or Lantus) 6 units in AM and 5 units in the PM   Novolog 4-5-4 units before meals   Add correction scale if needed.   Blood sugar 180 to 230 add 1 units   Blood sugar 231 to 280 add 2 units   Blood sugar greater than 281 add 3 units    -- Reviewed goals of therapy are to get the best control we can without hypoglycemia.  -- Reviewed patient's current insulin regimen. Clarified proper insulin dose and timing in relation to meals, etc. Insulin injection sites and proper rotation instructed.   -- Advised frequent self blood glucose monitoring.  Patient encouraged to document glucose results and bring them to every clinic visit.  -- Hypoglycemia precautions discussed. Instructed on precautions before driving.    -- Call for Bg repeatedly < 90 or > 180.   -- Close adherence to lifestyle changes recommended.   -- Periodic follow ups for eye evaluations, foot care and dental care suggested.    Senile osteoporosis  -- Risks include low weight,  race, menopause, prior chronic glucocorticoid use.  -- Reviewed basics of quantity versus quality.  -- Reassuring they are not fracturing.  -- Recommend:  -Pharmacological therapy is recommended for patients with osteopenia if the 10 year  probability of a hip fracture is >3% and 10 year probability of other major osteoporotic fractures is >20%.  Treatment options and potential side effects discussed for PO bisphosphonates, reclast, Denosumab, and Teriparatide.   -Treatment:   Actonel 8/2018 - 12/2021  She was given first dose of Prolia in 2/2018 and developed joint pains and lower ext edema so it was decided that she would not receive another dose of Prolia. We decided against Reclast for the same reason and instead restarted her on an oral bisphosphonate which she has tolerated without side effects. After her last visit 12/2021 we decided to stop the oral bisphosphonate and try Prolia again. She received Prolia  in 1/2022 without any side effects.  Last Dose: 2/2024  Continue Prolia every 6 months.  -Calcium and Vitamin D RDD provided.  -Exercise: recommended..  -Fall precautions made in the home.  -Alerted that if dental work needs to be done it should be done prior to initiating therapy. Dental health: UTD.  -- Repeat DEXA scan 11/2024. Reviewed BMD from 2023 noting decline - discussed switching to anabolic vs continuing (1/2024 - she declined). Now with hip fracture - repeat labs and will discuss again.    Vitamin D insufficiency  -- Check vitamin D.        Follow up in about 4 months (around 9/15/2024).      Visit today included increased complexity associated with the care of the problems addressed and managing the longitudinal care of the patient due to the serious and/or complex managed problems.

## 2024-05-13 NOTE — PROGRESS NOTES
DATE: 5/13/2024  PATIENT: Sussy Costa    Supervising Physician: Jass Menjivar M.D.    CHIEF COMPLAINT: right leg pain    HISTORY:  Sussy Costa is a 88 y.o. female with a pmhx of DM II, a fib, CKD III sp IM valeri left femur (3/31/24) here for initial evaluation of low back and right leg pain (Back - 0, Leg - 0). Pt says on 3/30/24 she fell, broke her left hip, and required surgery. She was doing PT after surgery and started having pain shooting down the front of her right leg. She was seen in the ED on 4/29/24 for similar complaints and was d/c stable. Pt says since then the right leg pain has improved. She reports a hx of chronic low back pain with intermittent radiculopathy. She has done ESIs many years ago with mild relief but denies surgery. There is positive associated numbness and tingling. There is positive subjective weakness.     The patient denies myelopathic symptoms such as handwriting changes or difficulty with buttons/coins/keys. Denies perineal paresthesias, bowel/bladder dysfunction.    PAST MEDICAL/SURGICAL HISTORY:  Past Medical History:   Diagnosis Date    Asthma     Cyst of pancreas     liver    Diabetes mellitus type II     Eczema of hand     Glaucoma     History of colon cancer 10/28/2022    Right sided colon cancer diagnosed in setting of LBO requiring urgent hemicolectomy 10/25/2022 with path showing [id number] disease. CT chest 12/7/22 with possible lung metastases and MR liver with indeterminate liver lesions. 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 Patient elected for surveillance.       History of recurrent pneumonia 09/28/2020    Hyperlipidemia     Hypertension     Lichen planus     gums    Osteoporosis     Other chronic pancreatitis 03/01/2023    Pancreas cyst 03/18/2016    Pulmonary nodules     Spinal stenosis of lumbar region 08/30/2018     Past Surgical History:   Procedure Laterality Date     BRONCHOSCOPY N/A 5/13/2022    Procedure: Bronchoscopy;  Surgeon: [facility] Diagnostic Provider;  [facility];  Service: Anesthesiology;  Laterality: N/A;    CATARACT EXTRACTION, BILATERAL      CHOLECYSTECTOMY      COLECTOMY, RIGHT Right 10/25/2022    Procedure: COLECTOMY, RIGHT;  Surgeon: Jass Holman MD;  [facility];  Service: Colon and Rectal;  Laterality: Right;    COLONOSCOPY N/A 6/26/2023    Procedure: COLONOSCOPY;  Surgeon: Jass Holman MD;  Location: [facility] ENDO (2ND FLR);  Service: Endoscopy;  Laterality: N/A;  2nd floor -lung disease  referral Dr [id number] portal/mailed-GT  6/20/23- Precall confirmed- [place]    ENDOSCOPIC ULTRASOUND OF UPPER GASTROINTESTINAL TRACT N/A 4/22/2022    Procedure: ULTRASOUND, UPPER GI TRACT, ENDOSCOPIC;  Surgeon: Max Rosado MD;  Location: [facility] ENDO (2ND FLR);  Service: Endoscopy;  Laterality: N/A;  urgent EUS (linear) for abnormal CT scan with dilated PD and increased cyst of pancreas cyst.  pap 44 mmHg  4/13:fully vaccinated. instructions via portal-SC    EPIDURAL STEROID INJECTION INTO LUMBAR SPINE N/A 11/8/2018    Procedure: Injection-steroid-epidural-lumbar L5-S1;  Surgeon: Nicci Osorio Jr., MD;  Location: [facility] PAIN MGT;  Service: Pain Management;  Laterality: N/A;    FUNCTIONAL ENDOSCOPIC SINUS SURGERY (FESS) USING COMPUTER-ASSISTED NAVIGATION N/A 6/17/2019    Procedure: FESS, USING COMPUTER-ASSISTED NAVIGATION;  Surgeon: Rosangela Isabel MD;  Location: [facility] OR;  Service: ENT;  Laterality: N/A;  video    HYSTERECTOMY      INTRAMEDULLARY RODDING OF FEMUR Left 3/31/2024    Procedure: INSERTION, INTRAMEDULLARY CAYDEN, FEMUR - Left,;  Surgeon: Nakul Walker MD;  [address]  Service: Orthopedics;  Laterality: Left;    nasal polyps         Medications:   Current Outpatient Medications on File Prior to Visit   Medication Sig Dispense Refill    acetaminophen (TYLENOL) 500 MG tablet Take 2 tablets (1,000  mg total) by mouth every 8 (eight) hours.  0    albuterol (PROVENTIL) 2.5 mg /3 mL (0.083 %) nebulizer solution Take 3 mLs (2.5 mg total) by nebulization every 6 (six) hours as needed for Wheezing or Shortness of Breath. Rescue 60 each 0    ascorbic acid, vitamin C, (VITAMIN C) 500 MG tablet Take 500 mg by mouth once daily.      atorvastatin (LIPITOR) 20 MG tablet Take 1 tablet (20 mg total) by mouth every evening. 90 tablet 1    calcium carbonate (TUMS) 200 mg calcium (500 mg) chewable tablet Take 1 tablet (500 mg total) by mouth 3 (three) times daily as needed for Heartburn.      cetirizine (ZYRTEC) 5 MG tablet Take 1 tablet (5 mg total) by mouth every evening.  0    denosumab (PROLIA) 60 mg/mL Syrg Inject 1 mL (60 mg total) into the skin every 6 (six) months. Hold at [facility]      fluticasone-umeclidin-vilanter (TRELEGY ELLIPTA) 200-62.5-25 mcg inhaler Inhale 1 puff into the lungs once daily. Hold at [facility] if not on formulary 3 each 4    furosemide (LASIX) 20 MG tablet Take 1 tablet (20 mg total) by mouth 2 (two) times daily. 60 tablet 11    insulin aspart U-100 (NOVOLOG FLEXPEN U-100 INSULIN) 100 unit/mL (3 mL) InPn pen Inject 5 Units into the skin 3 (three) times daily.  0    insulin aspart U-100 (NOVOLOG) 100 unit/mL (3 mL) InPn pen Inject 0-5 Units into the skin before meals and at bedtime as needed (Hyperglycemia).  0    insulin detemir U-100, Levemir, 100 unit/mL (3 mL) SubQ InPn pen Inject 4 Units into the skin 2 (two) times daily.  0    latanoprost 0.005 % ophthalmic solution Inject into the eye. 1 Drops Ophthalmic Every evening      lipase-protease-amylase 24,000-76,000-120,000 units (CREON) 24,000-76,000 -120,000 unit capsule Take 2 capsules with meals orally and 1 capsule with snacks. 300 capsule 11    losartan (COZAAR) 50 MG tablet Take 1 tablet (50 mg total) by mouth once daily. 30 tablet 5    magnesium oxide (MAG-OX) 400 mg (241.3 mg magnesium) tablet Take 400 mg by mouth once daily.      montelukast  (SINGULAIR) 10 mg tablet Take 1 tablet (10 mg total) by mouth every evening. 30 tablet 6    olopatadine (PATANOL) 0.1 % ophthalmic solution Place 1 drop into both eyes 2 (two) times daily as needed for Allergies. 1 Drops Ophthalmic Twice a day      ondansetron (ZOFRAN-ODT) 8 MG TbDL Take 1 tablet (8 mg total) by mouth every 6 (six) hours as needed.      pregabalin (LYRICA) 25 MG capsule Take 1 capsule (25 mg total) by mouth 2 (two) times daily. 60 capsule 0    senna-docusate 8.6-50 mg (PERICOLACE) 8.6-50 mg per tablet Take 1 tablet by mouth 2 (two) times daily.      vitamin D (VITAMIN D3) 1000 units Tab Take 1,000 Units by mouth once daily.      [DISCONTINUED] risedronate (ACTONEL) 35 MG tablet Take 1 tablet (35 mg total) by mouth every 7 days. 12 tablet 3     No current facility-administered medications on file prior to visit.       Social History:   Social History     Socioeconomic History    Marital status:      Spouse name: chuck    Number of children: 1   Occupational History    Occupation:    Tobacco Use    Smoking status: Never     Passive exposure: Never    Smokeless tobacco: Never   Substance and Sexual Activity    Alcohol use: Yes     Comment: socially    Drug use: No    Sexual activity: Yes     Partners: Male     Birth control/protection: Post-menopausal   Social History Narrative    [place] in [place] with  Chuck. Daughter passed away in 2017 with leukemia. Two adult grandchildren. Former .     Social Determinants of Health     Financial Resource Strain: Low Risk  (4/30/2024)    Overall Financial Resource Strain (CARDIA)     Difficulty of Paying Living Expenses: Not hard at all   Food Insecurity: No Food Insecurity (4/30/2024)    Hunger Vital Sign     Worried About Running Out of Food in the Last Year: Never true     Ran Out of Food in the Last Year: Never true   Transportation Needs: No Transportation Needs (4/30/2024)    PRAPARE - Transportation     Lack of  Transportation (Medical): No     Lack of Transportation (Non-Medical): No   Physical Activity: Insufficiently Active (4/30/2024)    Exercise Vital Sign     Days of Exercise per Week: 7 days     Minutes of Exercise per Session: 20 min   Stress: No Stress Concern Present (4/30/2024)    [employer] - Occupational Stress Questionnaire     Feeling of Stress : Only a little   Housing Stability: Low Risk  (4/30/2024)    Housing Stability Vital Sign     Unable to Pay for Housing in the Last Year: No     Homeless in the Last Year: No       REVIEW OF SYSTEMS:  Constitution: Negative. Negative for chills, fever and night sweats.   Cardiovascular: Negative for chest pain and syncope.   Respiratory: Negative for cough and shortness of breath.   Gastrointestinal: See HPI. Negative for nausea/vomiting. Negative for abdominal pain.  Genitourinary: See HPI. Negative for discoloration or dysuria.  Skin: Negative for dry skin, itching and rash.   Hematologic/Lymphatic: Negative for bleeding problem. Does not bruise/bleed easily.   Musculoskeletal: Negative for falls and muscle weakness.   Neurological: See HPI. No seizures.   Endocrine: Negative for polydipsia, polyphagia and polyuria.   Allergic/Immunologic: Negative for hives and persistent infections.     EXAM:  There were no vitals taken for this visit.    General: The patient is a very pleasant 88 y.o. female in no apparent distress, the patient is oriented to person, place and time.  Psych: Normal mood and affect  HEENT: Vision grossly intact, hearing intact to the spoken word.  Lungs: Respirations unlabored.  Gait: antalgic limping station and gait  Skin: Dorsal lumbar skin negative for rashes, lesions, hairy patches and surgical scars. There is negative lumbar tenderness to palpation.  Range of motion: Lumbar range of motion is acceptable.  Spinal Balance: Global saggital and coronal spinal balance acceptable, not significant for scoliosis and  kyphosis.  Musculoskeletal: No pain with the range of motion of the bilateral hips. No trochanteric tenderness to palpation.  Vascular: Bilateral lower extremities warm and well perfused, dorsalis pedis pulses 2+ bilaterally.  Neurological: 2/5 strength and tone in LLE compared RLE. Normal sensation to light touch in the L2-S1 dermatomes bilaterally.  Deep tendon reflexes symmetric 2+ in the bilateral lower extremities.  Negative Babinski bilaterally. Straight leg raise negative bilaterally.    IMAGING:      Today I personally reviewed AP, Lat and Flex/Ex  upright L-spine films that demonstrate moderate degenerative changes     MRI lumbar (2023) demonstrates multilevel degenerative changes of the lumbar spine, most advanced at L4-L5 with disc protrusion resulting in severe spinal canal stenosis      There is no height or weight on file to calculate BMI.    Hemoglobin A1C   Date Value Ref Range Status   05/08/2024 5.9 (H) 4.0 - 5.6 % Final     Comment:     ADA Screening Guidelines:  5.7-6.4%  Consistent with prediabetes  >or=6.5%  Consistent with diabetes    High levels of fetal hemoglobin interfere with the HbA1C  assay. Heterozygous hemoglobin variants (HbS, HgC, etc)do  not significantly interfere with this assay.   However, presence of multiple variants may affect accuracy.     03/30/2024 7.4 (H) 4.0 - 5.6 % Final     Comment:     ADA Screening Guidelines:  5.7-6.4%  Consistent with prediabetes  >or=6.5%  Consistent with diabetes    High levels of fetal hemoglobin interfere with the HbA1C  assay. Heterozygous hemoglobin variants (HbS, HgC, etc)do  not significantly interfere with this assay.   However, presence of multiple variants may affect accuracy.     01/03/2024 7.9 (H) 4.0 - 5.6 % Final     Comment:     ADA Screening Guidelines:  5.7-6.4%  Consistent with prediabetes  >or=6.5%  Consistent with diabetes    High levels of fetal hemoglobin interfere with the HbA1C  assay. Heterozygous hemoglobin variants (HbS,  HgC, etc)do  not significantly interfere with this assay.   However, presence of multiple variants may affect accuracy.             ASSESSMENT/PLAN:    There are no diagnoses linked to this encounter.    Today we discussed at length all of the different treatment options including anti-inflammatories, acetaminophen, rest, ice, heat, physical therapy including strengthening and stretching exercises, home exercises, ROM, aerobic conditioning, aqua therapy, other modalities including ultrasound, massage, and dry needling, epidural steroid injections and finally surgical intervention.      Pt sp left femur IM valeri 3/31/24 presents with acute right lumbar radiculopathy that has improved since onset. She would like to continue conservative rx for now (physical therapy). Will fu if pain persists, could consider lumbar TFESI.

## 2024-05-14 ENCOUNTER — HOSPITAL ENCOUNTER (OUTPATIENT)
Dept: RADIOLOGY | Facility: HOSPITAL | Age: 89
Discharge: HOME | End: 2024-05-14
Attending: ORTHOPAEDIC SURGERY
Payer: MEDICARE

## 2024-05-14 ENCOUNTER — OFFICE VISIT (OUTPATIENT)
Dept: ORTHOPEDICS | Facility: CLINIC | Age: 89
End: 2024-05-14
Payer: MEDICARE

## 2024-05-14 DIAGNOSIS — M54.50 LOW BACK PAIN, UNSPECIFIED BACK PAIN LATERALITY, UNSPECIFIED CHRONICITY, UNSPECIFIED WHETHER SCIATICA PRESENT: ICD-10-CM

## 2024-05-14 DIAGNOSIS — M54.50 LOW BACK PAIN, UNSPECIFIED BACK PAIN LATERALITY, UNSPECIFIED CHRONICITY, UNSPECIFIED WHETHER SCIATICA PRESENT: Primary | ICD-10-CM

## 2024-05-14 DIAGNOSIS — M54.16 LUMBAR RADICULOPATHY: Primary | ICD-10-CM

## 2024-05-14 PROCEDURE — 72110 X-RAY EXAM L-2 SPINE 4/>VWS: CPT | Mod: TC

## 2024-05-14 PROCEDURE — 99999 PR PBB SHADOW E&M-EST. PATIENT-LVL III: CPT | Mod: PBBFAC,,, | Performed by: ORTHOPAEDIC SURGERY

## 2024-05-14 PROCEDURE — 99213 OFFICE O/P EST LOW 20 MIN: CPT | Mod: PBBFAC,25 | Performed by: ORTHOPAEDIC SURGERY

## 2024-05-14 PROCEDURE — 72110 X-RAY EXAM L-2 SPINE 4/>VWS: CPT | Mod: 26,,, | Performed by: RADIOLOGY

## 2024-05-14 PROCEDURE — 99214 OFFICE O/P EST MOD 30 MIN: CPT | Mod: S$PBB,,, | Performed by: ORTHOPAEDIC SURGERY

## 2024-05-15 ENCOUNTER — OFFICE VISIT (OUTPATIENT)
Dept: ORTHOPEDICS | Facility: CLINIC | Age: 89
End: 2024-05-15
Payer: MEDICARE

## 2024-05-15 ENCOUNTER — HOSPITAL ENCOUNTER (OUTPATIENT)
Dept: RADIOLOGY | Facility: HOSPITAL | Age: 89
Discharge: HOME | End: 2024-05-15
Attending: PHYSICIAN ASSISTANT
Payer: MEDICARE

## 2024-05-15 ENCOUNTER — OFFICE VISIT (OUTPATIENT)
Dept: ENDOCRINOLOGY | Facility: CLINIC | Age: 89
End: 2024-05-15
Payer: MEDICARE

## 2024-05-15 VITALS
OXYGEN SATURATION: 95 % | SYSTOLIC BLOOD PRESSURE: 135 MMHG | HEART RATE: 77 BPM | BODY MASS INDEX: 19.87 KG/M2 | WEIGHT: 119.25 LBS | HEIGHT: 65 IN | DIASTOLIC BLOOD PRESSURE: 85 MMHG

## 2024-05-15 DIAGNOSIS — E11.29 TYPE 2 DIABETES MELLITUS WITH MICROALBUMINURIA, WITHOUT LONG-TERM CURRENT USE OF INSULIN: Primary | ICD-10-CM

## 2024-05-15 DIAGNOSIS — S72.142A CLOSED 2-PART INTERTROCHANTERIC FRACTURE OF LEFT FEMUR, INITIAL ENCOUNTER: Primary | ICD-10-CM

## 2024-05-15 DIAGNOSIS — M81.0 SENILE OSTEOPOROSIS: ICD-10-CM

## 2024-05-15 DIAGNOSIS — R80.9 TYPE 2 DIABETES MELLITUS WITH MICROALBUMINURIA, WITHOUT LONG-TERM CURRENT USE OF INSULIN: Primary | ICD-10-CM

## 2024-05-15 DIAGNOSIS — S72.142A CLOSED 2-PART INTERTROCHANTERIC FRACTURE OF LEFT FEMUR, INITIAL ENCOUNTER: ICD-10-CM

## 2024-05-15 DIAGNOSIS — M79.89 LEG SWELLING: ICD-10-CM

## 2024-05-15 DIAGNOSIS — E55.9 VITAMIN D INSUFFICIENCY: ICD-10-CM

## 2024-05-15 PROCEDURE — 99215 OFFICE O/P EST HI 40 MIN: CPT | Mod: PBBFAC,25 | Performed by: NURSE PRACTITIONER

## 2024-05-15 PROCEDURE — 99024 POSTOP FOLLOW-UP VISIT: CPT | Mod: POP,,, | Performed by: PHYSICIAN ASSISTANT

## 2024-05-15 PROCEDURE — G2211 COMPLEX E/M VISIT ADD ON: HCPCS | Mod: S$PBB,,, | Performed by: NURSE PRACTITIONER

## 2024-05-15 PROCEDURE — 99214 OFFICE O/P EST MOD 30 MIN: CPT | Mod: S$PBB,,, | Performed by: NURSE PRACTITIONER

## 2024-05-15 PROCEDURE — 99214 OFFICE O/P EST MOD 30 MIN: CPT | Mod: PBBFAC,25,27 | Performed by: PHYSICIAN ASSISTANT

## 2024-05-15 PROCEDURE — 73552 X-RAY EXAM OF FEMUR 2/>: CPT | Mod: TC,LT

## 2024-05-15 PROCEDURE — 99999 PR PBB SHADOW E&M-EST. PATIENT-LVL V: CPT | Mod: PBBFAC,,, | Performed by: NURSE PRACTITIONER

## 2024-05-15 PROCEDURE — 99999 PR PBB SHADOW E&M-EST. PATIENT-LVL IV: CPT | Mod: PBBFAC,,, | Performed by: PHYSICIAN ASSISTANT

## 2024-05-15 PROCEDURE — 73552 X-RAY EXAM OF FEMUR 2/>: CPT | Mod: 26,LT,, | Performed by: RADIOLOGY

## 2024-05-15 RX ORDER — INSULIN GLARGINE 100 [IU]/ML
INJECTION, SOLUTION SUBCUTANEOUS
Qty: 10 ML | Refills: 3 | Status: SHIPPED | OUTPATIENT
Start: 2024-05-15 | End: 2025-05-15

## 2024-05-15 RX ORDER — DOXYCYCLINE HYCLATE 100 MG
100 TABLET ORAL 2 TIMES DAILY
Qty: 20 TABLET | Refills: 0 | Status: SHIPPED | OUTPATIENT
Start: 2024-05-15 | End: 2024-05-25

## 2024-05-15 NOTE — ASSESSMENT & PLAN NOTE
-- Labs prior to follow up.  -- History of chronic pancreatitis. Low C peptide - manage as T1DM.  -- Brittle diabetic.  -- A1c goal < or = 8%.  -- Medications discussed:  Insulin   -- Reviewed logs/CGM:  Instructed to send glucose logs in 7-14 days.  Reach out to me sooner for any glucose <80 or consistently >200.  Not interested in personal CGM.  -- Medication Changes:   Levemir (or Lantus) 6 units in AM and 5 units in the PM   Novolog 4-5-4 units before meals   Add correction scale if needed.   Blood sugar 180 to 230 add 1 units   Blood sugar 231 to 280 add 2 units   Blood sugar greater than 281 add 3 units    -- Reviewed goals of therapy are to get the best control we can without hypoglycemia.  -- Reviewed patient's current insulin regimen. Clarified proper insulin dose and timing in relation to meals, etc. Insulin injection sites and proper rotation instructed.   -- Advised frequent self blood glucose monitoring.  Patient encouraged to document glucose results and bring them to every clinic visit.  -- Hypoglycemia precautions discussed. Instructed on precautions before driving.    -- Call for Bg repeatedly < 90 or > 180.   -- Close adherence to lifestyle changes recommended.   -- Periodic follow ups for eye evaluations, foot care and dental care suggested.

## 2024-05-15 NOTE — ASSESSMENT & PLAN NOTE
-- Risks include low weight,  race, menopause, prior chronic glucocorticoid use.  -- Reviewed basics of quantity versus quality.  -- Reassuring they are not fracturing.  -- Recommend:  -Pharmacological therapy is recommended for patients with osteopenia if the 10 year probability of a hip fracture is >3% and 10 year probability of other major osteoporotic fractures is >20%.  Treatment options and potential side effects discussed for PO bisphosphonates, reclast, Denosumab, and Teriparatide.   -Treatment:   Actonel 8/2018 - 12/2021  She was given first dose of Prolia in 2/2018 and developed joint pains and lower ext edema so it was decided that she would not receive another dose of Prolia. We decided against Reclast for the same reason and instead restarted her on an oral bisphosphonate which she has tolerated without side effects. After her last visit 12/2021 we decided to stop the oral bisphosphonate and try Prolia again. She received Prolia  in 1/2022 without any side effects.  Last Dose: 2/2024  Continue Prolia every 6 months.  -Calcium and Vitamin D RDD provided.  -Exercise: recommended..  -Fall precautions made in the home.  -Alerted that if dental work needs to be done it should be done prior to initiating therapy. Dental health: UTD.  -- Repeat DEXA scan 11/2024. Reviewed BMD from 2023 noting decline - discussed switching to anabolic vs continuing (1/2024 - she declined). Now with hip fracture - repeat labs and will discuss again.

## 2024-05-15 NOTE — PATIENT INSTRUCTIONS
Levemir (or Lantus) 6 units in AM and 5 units in the PM   Novolog 4-5-4 units before meals   Add correction scale if needed.   Blood sugar 180 to 230 add 1 units   Blood sugar 231 to 280 add 2 units   Blood sugar greater than 281 add 3 units     Instructed to send glucose logs in 14 days.  Reach out to me sooner for any glucose <80 or consistently >200.

## 2024-05-15 NOTE — Clinical Note
Labs prior to her follow up please Orders Placed This Encounter     Hemoglobin A1C     Vitamin D     Renal Function Panel

## 2024-05-17 ENCOUNTER — PATIENT MESSAGE (OUTPATIENT)
Dept: ORTHOPEDICS | Facility: CLINIC | Age: 89
End: 2024-05-17
Payer: MEDICARE

## 2024-05-21 ENCOUNTER — EXTERNAL HOME HEALTH (OUTPATIENT)
Dept: HOME HEALTH SERVICES | Facility: HOSPITAL | Age: 89
End: 2024-05-21
Payer: MEDICARE

## 2024-05-24 ENCOUNTER — TELEPHONE (OUTPATIENT)
Dept: OTOLARYNGOLOGY | Facility: CLINIC | Age: 89
End: 2024-05-24
Payer: MEDICARE

## 2024-05-24 NOTE — TELEPHONE ENCOUNTER
----- Message from Aimee Cam sent at 5/24/2024  9:13 AM CDT -----  Type:  Needs Medical Advice    Who Called: Pt   Would the patient rather a call back or a response via [facility]? Callback   Best Call Back Number:  [phone]  Additional Information: Caller is requesting a callback in regards to medication concerns/ requests for refills

## 2024-05-24 NOTE — TELEPHONE ENCOUNTER
Patient called to get an refill request on medication from [facility]. The medications are budesonide and the nasal irrigation. I did tell the patient that  is out of office so please allow time for her response          ----- Message from Aimee Cam sent at 5/24/2024  9:13 AM CDT -----  Type:  Needs Medical Advice    Who Called: Pt   Would the patient rather a call back or a response via [facility]? Callback   Best Call Back Number:  [phone]  Additional Information: Caller is requesting a callback in regards to medication concerns/ requests for refills

## 2024-05-27 ENCOUNTER — OFFICE VISIT (OUTPATIENT)
Dept: INFECTIOUS DISEASES | Facility: CLINIC | Age: 89
End: 2024-05-27
Payer: MEDICARE

## 2024-05-27 VITALS
DIASTOLIC BLOOD PRESSURE: 75 MMHG | HEIGHT: 65 IN | BODY MASS INDEX: 19.43 KG/M2 | HEART RATE: 76 BPM | WEIGHT: 116.63 LBS | TEMPERATURE: 98 F | SYSTOLIC BLOOD PRESSURE: 159 MMHG

## 2024-05-27 DIAGNOSIS — M79.89 LEG SWELLING: ICD-10-CM

## 2024-05-27 PROCEDURE — 99999 PR PBB SHADOW E&M-EST. PATIENT-LVL V: CPT | Mod: PBBFAC,,, | Performed by: INTERNAL MEDICINE

## 2024-05-27 PROCEDURE — 99215 OFFICE O/P EST HI 40 MIN: CPT | Mod: PBBFAC | Performed by: INTERNAL MEDICINE

## 2024-05-27 PROCEDURE — 99203 OFFICE O/P NEW LOW 30 MIN: CPT | Mod: S$PBB,,, | Performed by: INTERNAL MEDICINE

## 2024-05-27 RX ORDER — CEFADROXIL 500 MG/1
500 CAPSULE ORAL EVERY 12 HOURS
Qty: 7 CAPSULE | Refills: 0 | Status: SHIPPED | OUTPATIENT
Start: 2024-05-27 | End: 2024-06-03

## 2024-05-27 NOTE — PROGRESS NOTES
"Infectious Disease Clinic Note  05/27/2024       Subjective:       Patient ID: Sussy Costa is a 88 y.o. female being seen for an new visit.    Chief Complaint: Follow-up    HPI  87y/o F pt with hx of T2DM, HLD, HLD and asthma presents for erythema on legs.     Reports she presented with erythematous skin on legs a "while back." Also noted edema. It got worse while she was in the hospital in march. She was admitted following a fall that resulted on a hip fracture s/p intramedullary valeri insertion 3/30/24.     She has now received 2 courses of doxycycline. Last dose Saturday 5/25. Tolerated well. Thinks abx may have helped, but notes lasix is also helping.     Denies fevers, chills, night sweats. Still experiencing burning sensation on anterior legs b/l.    4/29/24- US no evidence of DVTs    PCN allergy- rash. Has tolerated ceftriaxone in the past.   Family History   Problem Relation Name Age of Onset    Heart disease Father      Heart disease Brother      Hypertension Brother       Social History     Socioeconomic History    Marital status:      Spouse name: sania    Number of children: 1   Occupational History    Occupation:    Tobacco Use    Smoking status: Never     Passive exposure: Never    Smokeless tobacco: Never   Substance and Sexual Activity    Alcohol use: Yes     Comment: socially    Drug use: No    Sexual activity: Yes     Partners: Male     Birth control/protection: Post-menopausal   Social History Narrative    Lives in [place] with  Sania. Daughter passed away in 2017 with leukemia. Two adult grandchildren. Former .     Social Determinants of Health     Financial Resource Strain: Low Risk  (4/30/2024)    Overall Financial Resource Strain (CARDIA)     Difficulty of Paying Living Expenses: Not hard at all   Food Insecurity: No Food Insecurity (4/30/2024)    Hunger Vital Sign     Worried About Running Out of Food in the Last Year: Never true     Ran Out of Food in " the Last Year: Never true   Transportation Needs: No Transportation Needs (4/30/2024)    PRAPARE - Transportation     Lack of Transportation (Medical): No     Lack of Transportation (Non-Medical): No   Physical Activity: Insufficiently Active (4/30/2024)    Exercise Vital Sign     Days of Exercise per Week: 7 days     Minutes of Exercise per Session: 20 min   Stress: No Stress Concern Present (4/30/2024)    [employer] - Occupational Stress Questionnaire     Feeling of Stress : Only a little   Housing Stability: Low Risk  (4/30/2024)    Housing Stability Vital Sign     Unable to Pay for Housing in the Last Year: No     Homeless in the Last Year: No     Past Surgical History:   Procedure Laterality Date    BRONCHOSCOPY N/A 5/13/2022    Procedure: Bronchoscopy;  Surgeon: [facility] Provider;  [facility];  Service: Anesthesiology;  Laterality: N/A;    CATARACT EXTRACTION, BILATERAL      CHOLECYSTECTOMY      COLECTOMY, RIGHT Right 10/25/2022    Procedure: COLECTOMY, RIGHT;  Surgeon: Jass Holman MD;  [facility];  Service: Colon and Rectal;  Laterality: Right;    COLONOSCOPY N/A 6/26/2023    Procedure: COLONOSCOPY;  Surgeon: Jass Holman MD;  Location: [facility] ENDO (2ND FLR);  Service: Endoscopy;  Laterality: N/A;  2nd floor -lung disease  referral Dr [id number] portal/mailed-GT  6/20/23- [place] confirmed- [place]    ENDOSCOPIC ULTRASOUND OF UPPER GASTROINTESTINAL TRACT N/A 4/22/2022    Procedure: ULTRASOUND, UPPER GI TRACT, ENDOSCOPIC;  Surgeon: Max Rosado MD;  Location: [facility] ENDO (2ND FLR);  Service: Endoscopy;  Laterality: N/A;  urgent EUS (linear) for abnormal CT scan with dilated PD and increased cyst of pancreas cyst.  pap 44 mmHg  4/13:fully vaccinated. instructions via portal-[facility]    EPIDURAL STEROID INJECTION INTO LUMBAR SPINE N/A 11/8/2018    Procedure: Injection-steroid-epidural-lumbar L5-S1;  Surgeon: Nicci Osorio Jr.,  MD;  Location: [facility] PAIN MGT;  Service: Pain Management;  Laterality: N/A;    FUNCTIONAL ENDOSCOPIC SINUS SURGERY (FESS) USING COMPUTER-ASSISTED NAVIGATION N/A 6/17/2019    Procedure: FESS, USING COMPUTER-ASSISTED NAVIGATION;  Surgeon: Rosangela Isabel MD;  Location: [facility] OR;  Service: ENT;  Laterality: N/A;  video    HYSTERECTOMY      INTRAMEDULLARY RODDING OF FEMUR Left 3/31/2024    Procedure: INSERTION, INTRAMEDULLARY CAYDEN, FEMUR - Left,;  Surgeon: Nakul Walker MD;  [address];  Service: Orthopedics;  Laterality: Left;    nasal polyps         Patient's Medications   New Prescriptions    No medications on file   Previous Medications    ACETAMINOPHEN (TYLENOL) 500 MG TABLET    Take 2 tablets (1,000 mg total) by mouth every 8 (eight) hours.    ALBUTEROL (PROVENTIL) 2.5 MG /3 ML (0.083 %) NEBULIZER SOLUTION    Take 3 mLs (2.5 mg total) by nebulization every 6 (six) hours as needed for Wheezing or Shortness of Breath. Rescue    ASCORBIC ACID, VITAMIN C, (VITAMIN C) 500 MG TABLET    Take 500 mg by mouth once daily.    ATORVASTATIN (LIPITOR) 20 MG TABLET    Take 1 tablet (20 mg total) by mouth every evening.    CALCIUM CARBONATE (TUMS) 200 MG CALCIUM (500 MG) CHEWABLE TABLET    Take 1 tablet (500 mg total) by mouth 3 (three) times daily as needed for Heartburn.    CETIRIZINE (ZYRTEC) 5 MG TABLET    Take 1 tablet (5 mg total) by mouth every evening.    DENOSUMAB (PROLIA) 60 MG/ML SYRG    Inject 1 mL (60 mg total) into the skin every 6 (six) months. Hold at rehab    FLUTICASONE-UMECLIDIN-VILANTER (TRELEGY ELLIPTA) 200-62.5-25 MCG INHALER    Inhale 1 puff into the lungs once daily. Hold at rehah if not on formulary    FUROSEMIDE (LASIX) 20 MG TABLET    Take 1 tablet (20 mg total) by mouth 2 (two) times daily.    INSULIN ASPART U-100 (NOVOLOG FLEXPEN U-100 INSULIN) 100 UNIT/ML (3 ML) INPN PEN    Inject 5 Units into the skin 3 (three) times daily.    INSULIN ASPART U-100 (NOVOLOG) 100 UNIT/ML (3  ML) INPN PEN    Inject 0-5 Units into the skin before meals and at bedtime as needed (Hyperglycemia).    INSULIN GLARGINE U-100, LANTUS, (LANTUS SOLOSTAR U-100 INSULIN) 100 UNIT/ML (3 ML) INPN PEN    Inject 6 Units into the skin once daily AND 5 Units every evening.    LATANOPROST 0.005 % OPHTHALMIC SOLUTION    Inject into the eye. 1 Drops Ophthalmic Every evening    LIPASE-PROTEASE-AMYLASE 24,000-76,000-120,000 UNITS (CREON) 24,000-76,000 -120,000 UNIT CAPSULE    Take 2 capsules with meals orally and 1 capsule with snacks.    LOSARTAN (COZAAR) 50 MG TABLET    Take 1 tablet (50 mg total) by mouth once daily.    MAGNESIUM OXIDE (MAG-OX) 400 MG (241.3 MG MAGNESIUM) TABLET    Take 400 mg by mouth once daily.    MONTELUKAST (SINGULAIR) 10 MG TABLET    Take 1 tablet (10 mg total) by mouth every evening.    OLOPATADINE (PATANOL) 0.1 % OPHTHALMIC SOLUTION    Place 1 drop into both eyes 2 (two) times daily as needed for Allergies. 1 Drops Ophthalmic Twice a day    ONDANSETRON (ZOFRAN-ODT) 8 MG TBDL    Take 1 tablet (8 mg total) by mouth every 6 (six) hours as needed.    PREGABALIN (LYRICA) 25 MG CAPSULE    Take 1 capsule (25 mg total) by mouth 2 (two) times daily.    SENNA-DOCUSATE 8.6-50 MG (PERICOLACE) 8.6-50 MG PER TABLET    Take 1 tablet by mouth 2 (two) times daily.    VITAMIN D (VITAMIN D3) 1000 UNITS TAB    Take 1,000 Units by mouth once daily.   Modified Medications    No medications on file   Discontinued Medications    No medications on file       Patient Active Problem List    Diagnosis Date Noted    Body mass index (BMI) less than 19 04/01/2024    Thrombocytopenia 04/01/2024    Skin tear of hand without complication, left, initial encounter 04/01/2024    Hypomagnesemia 03/31/2024    Acute blood loss anemia 03/31/2024    Vitamin D insufficiency 03/31/2024    Constipation 03/31/2024    ADARSH (acute kidney injury) 03/31/2024    Closed 2-part intertrochanteric fracture of left femur 03/30/2024    Balance problem  02/19/2024    Stage 3 chronic kidney disease 12/07/2023    Abdominal pain 11/18/2023    Other chronic pancreatitis 03/01/2023    Paroxysmal atrial fibrillation 03/01/2023    Aortic atherosclerosis 03/01/2023    Carcinoid bronchial adenoma, 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 On imaging review appears indolent. Plan for surveillance      Pancreatic insufficiency 12/09/2022     Diagnosed in setting of weight loss and bowel incontinence 2022. Has chronic pancreatitis on imaging. Home medications include Creon      Type 2 diabetes mellitus with microalbuminuria, without long-term current use of insulin 11/30/2022     Home regimen includes levemir and Novolog.       Right hip pain 11/02/2022    Impaired mobility 11/01/2022    History of colon cancer 10/28/2022     Right sided colon cancer diagnosed in setting of LBO requiring urgent hemicolectomy 10/25/2022 with path showing [id number] disease. CT chest 12/7/22 with possible lung metastases and MR liver with indeterminate liver lesions. 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 Patient elected for surveillance.        Hypophosphatemia 10/27/2022    Asthma, moderate persistent 10/26/2022     Well controlled on Trelegy, singulair, and albuterol prn.      Severe protein-calorie malnutrition 07/06/2022     Bmi of 15.41, hypoalbuminemia       Diastolic dysfunction 08/15/2021     Last TTE 07/2022. Continues on furosemide 20mg po daily.      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   HENT:  Negative for congestion and sore throat.    Eyes:  Negative for blurred vision and double vision.   Respiratory:  Negative for cough and shortness of breath.    Cardiovascular:  Negative for chest pain and palpitations.   Gastrointestinal:  Negative for diarrhea and vomiting.   Musculoskeletal:  Negative for myalgias and neck pain.        +b/l leg swelling and erythema   Skin:  Negative for itching and rash.   Neurological:  Negative for dizziness and headaches.   Psychiatric/Behavioral:  Negative for substance abuse. The patient is not nervous/anxious.    All other systems reviewed and are negative.          Objective:      Ht 5' 5" (1.651 m)   BMI 19.85 kg/m²   Estimated body mass index is 19.85 kg/m² as calculated from the following:    Height as of this encounter: 5' 5" (1.651 m).    Weight as of 5/15/24: 54.1 kg (119 lb 4.3 oz).    Physical Exam  Constitutional:       General: She is not in acute distress.     Appearance: She is well-developed.   HENT:      Head: Normocephalic and atraumatic.   Eyes:      Conjunctiva/sclera: Conjunctivae normal.      Pupils: Pupils are equal, round, and reactive to light.   Cardiovascular:      Rate and Rhythm: Normal rate and regular rhythm.      Heart sounds: Normal heart sounds.   Pulmonary:      Effort: Pulmonary effort is normal. No respiratory distress.      Breath sounds: Normal breath sounds.   Abdominal:      General: Bowel sounds are normal. There is no distension.      Palpations: Abdomen is soft.   Musculoskeletal:         General: Normal range of motion.      Cervical back: Normal range of motion and neck supple.      Right lower leg: Edema present.      Left lower leg: Edema present.   Skin:     General: Skin is warm and dry.      Comments: Skin is erythematous and warm anteriorly on both legs   Neurological:      General: No focal " deficit present.      Mental Status: She is alert and oriented to person, place, and time.      Cranial Nerves: No cranial nerve deficit.   Psychiatric:         Mood and Affect: Mood normal.         Behavior: Behavior normal.         Assessment:         1. Leg swelling  Ambulatory referral/consult to Infectious Disease      2. cellulitis      Plan:       Sussy was seen today for follow-up.    Diagnoses and all orders for this visit:    Leg swelling  89y/o F with numerous comorbidities and recent L hip fracture presents for b/l lower extremity erythema concerning for cellulitis. Unclear if skin discoloration is due to venous insufficiency vs cellulitis. Per pt, may have improved on doxy, but not resolved. Given advanced age and hx of diabetes will trial a course of cefadroxil which has better strep coverage. Script sent. Counseled on signs and symptoms of worsening infection and advised to notify me if these were to arise or if has concern for any med side effects or diarrhea.    RTC in 2 wks.     Sabiha Goel MD  Infectious Disease     Total professional time spent for the encounter: 30 minutes  Time was spent preparing to see the patient, reviewing results of prior testing, obtaining and/or reviewing separately obtained history, performing a medically appropriate examination and interview, counseling and educating the patient/family, ordering medications/tests/procedures, referring and communicating with other health care professionals, documenting clinical information in the electronic health record, and independently interpreting results.

## 2024-05-28 ENCOUNTER — TELEPHONE (OUTPATIENT)
Dept: OTOLARYNGOLOGY | Facility: CLINIC | Age: 89
End: 2024-05-28
Payer: MEDICARE

## 2024-05-28 ENCOUNTER — DOCUMENT SCAN (OUTPATIENT)
Dept: HOME HEALTH SERVICES | Facility: HOSPITAL | Age: 89
End: 2024-05-28
Payer: MEDICARE

## 2024-05-28 NOTE — TELEPHONE ENCOUNTER
Returned call. Informed pt that I spoke with the pharmacist at [facility] to provide verbal order for refill on Budesonide. Pt thanked me and verbalized understanding.   ----- Message from Giselle lFores sent at 5/28/2024 12:41 PM CDT -----  Type:  Needs Medical Advice    Who Called: pt  Would the patient rather a call back or a response via [facility]? call  Best Call Back Number: [phone]   Additional Information:   Pt requesting a call regarding her medication

## 2024-05-28 NOTE — TELEPHONE ENCOUNTER
Spoke with the pharmacist at [facility]. Provided verbal order for refill on Budesonide. Pharmacist thanked me and verbalized understanding.

## 2024-06-03 DIAGNOSIS — R80.9 TYPE 2 DIABETES MELLITUS WITH MICROALBUMINURIA, WITHOUT LONG-TERM CURRENT USE OF INSULIN: Primary | ICD-10-CM

## 2024-06-03 DIAGNOSIS — E11.29 TYPE 2 DIABETES MELLITUS WITH MICROALBUMINURIA, WITHOUT LONG-TERM CURRENT USE OF INSULIN: Primary | ICD-10-CM

## 2024-06-03 RX ORDER — INSULIN ASPART 100 [IU]/ML
5 INJECTION, SOLUTION INTRAVENOUS; SUBCUTANEOUS
Qty: 13.5 ML | Refills: 3 | Status: SHIPPED | OUTPATIENT
Start: 2024-06-03 | End: 2025-06-03

## 2024-06-05 ENCOUNTER — OFFICE VISIT (OUTPATIENT)
Dept: HEMATOLOGY/ONCOLOGY | Facility: CLINIC | Age: 89
End: 2024-06-05
Payer: MEDICARE

## 2024-06-05 ENCOUNTER — TELEPHONE (OUTPATIENT)
Dept: PODIATRY | Facility: CLINIC | Age: 89
End: 2024-06-05
Payer: MEDICARE

## 2024-06-05 ENCOUNTER — LAB VISIT (OUTPATIENT)
Dept: LAB | Facility: HOSPITAL | Age: 89
End: 2024-06-05
Attending: HOSPITALIST
Payer: MEDICARE

## 2024-06-05 VITALS
OXYGEN SATURATION: 100 % | HEART RATE: 69 BPM | SYSTOLIC BLOOD PRESSURE: 170 MMHG | TEMPERATURE: 98 F | WEIGHT: 116.38 LBS | BODY MASS INDEX: 19.37 KG/M2 | DIASTOLIC BLOOD PRESSURE: 73 MMHG

## 2024-06-05 DIAGNOSIS — Z85.038 HISTORY OF COLON CANCER: Primary | ICD-10-CM

## 2024-06-05 DIAGNOSIS — Z85.038 HISTORY OF COLON CANCER: ICD-10-CM

## 2024-06-05 DIAGNOSIS — C7A.090 CARCINOID BRONCHIAL ADENOMA, RIGHT: ICD-10-CM

## 2024-06-05 DIAGNOSIS — E87.6 HYPOKALEMIA: ICD-10-CM

## 2024-06-05 DIAGNOSIS — I10 PRIMARY HYPERTENSION: ICD-10-CM

## 2024-06-05 PROBLEM — E43 SEVERE PROTEIN-CALORIE MALNUTRITION: Status: RESOLVED | Noted: 2022-07-06 | Resolved: 2024-06-05

## 2024-06-05 LAB
ALBUMIN SERPL BCP-MCNC: 3.4 G/DL (ref 3.5–5.2)
ALP SERPL-CCNC: 83 U/L (ref 55–135)
ALT SERPL W/O P-5'-P-CCNC: 15 U/L (ref 10–44)
ANION GAP SERPL CALC-SCNC: 10 MMOL/L (ref 8–16)
AST SERPL-CCNC: 19 U/L (ref 10–40)
BILIRUB SERPL-MCNC: 0.4 MG/DL (ref 0.1–1)
BUN SERPL-MCNC: 23 MG/DL (ref 8–23)
CALCIUM SERPL-MCNC: 9.2 MG/DL (ref 8.7–10.5)
CHLORIDE SERPL-SCNC: 111 MMOL/L (ref 95–110)
CO2 SERPL-SCNC: 24 MMOL/L (ref 23–29)
CREAT SERPL-MCNC: 1.2 MG/DL (ref 0.5–1.4)
ERYTHROCYTE [DISTWIDTH] IN BLOOD BY AUTOMATED COUNT: 14.6 % (ref 11.5–14.5)
EST. GFR  (NO RACE VARIABLE): 43.8 ML/MIN/1.73 M^2
FERRITIN SERPL-MCNC: 179 NG/ML (ref 20–300)
GLUCOSE SERPL-MCNC: 79 MG/DL (ref 70–110)
HCT VFR BLD AUTO: 31 % (ref 37–48.5)
HGB BLD-MCNC: 9.5 G/DL (ref 12–16)
IMM GRANULOCYTES # BLD AUTO: 0.02 K/UL (ref 0–0.04)
IRON SERPL-MCNC: 38 UG/DL (ref 30–160)
MCH RBC QN AUTO: 30.4 PG (ref 27–31)
MCHC RBC AUTO-ENTMCNC: 30.6 G/DL (ref 32–36)
MCV RBC AUTO: 99 FL (ref 82–98)
NEUTROPHILS # BLD AUTO: 5.7 K/UL (ref 1.8–7.7)
PLATELET # BLD AUTO: 229 K/UL (ref 150–450)
PMV BLD AUTO: 12.1 FL (ref 9.2–12.9)
POTASSIUM SERPL-SCNC: 3 MMOL/L (ref 3.5–5.1)
PROT SERPL-MCNC: 6.4 G/DL (ref 6–8.4)
RBC # BLD AUTO: 3.12 M/UL (ref 4–5.4)
SATURATED IRON: 12 % (ref 20–50)
SODIUM SERPL-SCNC: 145 MMOL/L (ref 136–145)
TOTAL IRON BINDING CAPACITY: 321 UG/DL (ref 250–450)
TRANSFERRIN SERPL-MCNC: 217 MG/DL (ref 200–375)
WBC # BLD AUTO: 9.59 K/UL (ref 3.9–12.7)

## 2024-06-05 PROCEDURE — 80053 COMPREHEN METABOLIC PANEL: CPT | Performed by: HOSPITALIST

## 2024-06-05 PROCEDURE — 82728 ASSAY OF FERRITIN: CPT | Performed by: HOSPITALIST

## 2024-06-05 PROCEDURE — G2211 COMPLEX E/M VISIT ADD ON: HCPCS | Mod: S$PBB,,, | Performed by: HOSPITALIST

## 2024-06-05 PROCEDURE — 36415 COLL VENOUS BLD VENIPUNCTURE: CPT | Performed by: HOSPITALIST

## 2024-06-05 PROCEDURE — 99215 OFFICE O/P EST HI 40 MIN: CPT | Mod: S$PBB,,, | Performed by: HOSPITALIST

## 2024-06-05 PROCEDURE — 99214 OFFICE O/P EST MOD 30 MIN: CPT | Mod: PBBFAC | Performed by: HOSPITALIST

## 2024-06-05 PROCEDURE — 99999 PR PBB SHADOW E&M-EST. PATIENT-LVL IV: CPT | Mod: PBBFAC,,, | Performed by: HOSPITALIST

## 2024-06-05 PROCEDURE — 83540 ASSAY OF IRON: CPT | Performed by: HOSPITALIST

## 2024-06-05 PROCEDURE — 85027 COMPLETE CBC AUTOMATED: CPT | Performed by: HOSPITALIST

## 2024-06-05 RX ORDER — POTASSIUM CHLORIDE 20 MEQ/1
20 TABLET, EXTENDED RELEASE ORAL 2 TIMES DAILY
Qty: 60 TABLET | Refills: 2 | Status: SHIPPED | OUTPATIENT
Start: 2024-06-05

## 2024-06-05 NOTE — TELEPHONE ENCOUNTER
Left Vm to assist patient with getting rescheduled for nail care with Dr Campa out in surgery on 6/6.

## 2024-06-05 NOTE — PROGRESS NOTES
MEDICAL ONCOLOGY FOLLOW-UP VISIT.     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 female with asthma, recurrent PNA and hypoxic respiratory failure, and DM2 who was hospitalized 10/2022 for large bowel obstruction. 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  She deferred adjuvant chemotherapy for her high risk stage II CRC.  She presents to medical oncology clinic for routine follow up.    Interval History:     Ongoing swelling in the left leg. Recently finished abx for celllitis. Had  negative doppler US 4/29/24. Appetit is good. No N/V. No paritcular pain aside from some ongoing post operative pain in her leg. Continues working with PT. Repoerts normal bowel movements.          Oncology History   History of colon cancer   10/25/2022 Surgery    Right hemicolectomy    Moderately differentiated MMR proficient adenocarcinoma invading into the visceral peritoneum with associated PNI, LVI, and high tumor budding. Negative margins. 0/18 LN. [id number]     10/25/2022 Imaging Significant Findings    CT a/p with contrast:     1. Circumferential wall thickening of the short segment of ascending colon and apple-core appearance with pericolic fat stranding and free fluid, resulting in significant mass effect and dilatation of the cecum up to measuring 10 cm.  Suspect obstructing ascending colon neoplasm.    2. Diffuse gastric wall thickening and enhancement suggesting gastritis.  3. Stable innumerable pancreatic hypodense lesion.  4. Pulmonary nodules  similar in size and number in comparison prior.  Metastasis not excluded.  5. Multiple hypodense lesions in the liver and a few in the spleen as well, similar to prior.  Metastasis not excluded.       12/7/2022 Imaging Significant Findings    CT chest:    1. Following resolution of left inter lobar and lower lobe bronchi mucous plugging, there is resolution of previous left lower lobe atelectasis.  However, there are many bilateral subcentimeter pulmonary nodules in both lower lobes which appear increased since a CT of the abdomen and pelvis 10/25/2022 in which there is partial imaging of the chest base.  There is a new 1.4 cm right lower lobe nodule.  2. Interval development of small volume right-sided pleural effusion.  This report was flagged in Epic as abnormal.     12/19/2022 Imaging Significant Findings    MR abdomen without evidence of intrabdominal malignancy. Ongoing chronic pancreatitis     1/24/2023 Biopsy    Biopsy of right lower lube nodule : Typical carcinoid. No mitoses noted.     2/15/2023 Notable Event      Seen by rad onc (Dr. Zamorano) 02/15/23 regarding the carcinoid tumor in the lung- imaging felt consistent with an indolent carcinoid tumor over the last decade with more recent inflammatory nodule. Plan for ongoing surveillance.     5/1/2023 Imaging Significant Findings    CT torso: Improvement in the 1.4cm RLL nodule. This nodule was not biopsied, and was though possible more inflammatory in nature. Other subcentimeter nodules remain stable. No evidence of disease progression. No other evidence of recurrent or persistent colon cancer.     6/26/2023 Procedure    Colonoscopy  Impression:      - Patent side-to-side ileo-colonic anastomosis,                          characterized by healthy appearing mucosa.                          - Diverticulosis in the sigmoid colon.                          - Internal hemorrhoids.                          - No specimens collected.       Imaging Significant  Findings    CT CAP  Impression:  1. Postoperative change prior right hemicolectomy without CT evidence of residual/recurrent disease.  2. Numerous scattered ground-glass and solid pulmonary nodules, not significantly changed.  No appreciable new or enlarging pulmonary nodules.  3. Cystic lesions within the pancreas with persistent dilatation of the main pancreatic duct, not significantly changed.     4/2024 Imaging Significant Findings    CT Chest 5/6/24  1. Redemonstration numerous bilateral pulmonary nodules as above which appear grossly stable.  2. Interval increase in size of right pleural effusion and small left pleural effusion.  3. 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  4. Linear opacities in the left lower lobe extending from the hilum to the pleura and in the right middle lobe, likely subsegmental atelectasis versus scarring.  5. Additional findings as above.    CT a/p 4/29/24  1. Large amount of stool throughout the colon suggests constipation, correlation is advised.  2. Findings suggest hepatic steatosis, correlation with LFTs recommended.  3. Stable configuration of the pancreas noting likely sequela of chronic pancreatitis and ductal dilation.  No interval inflammation about the pancreas.  Correlation with pancreatic enzymes as warranted.  4. The urinary bladder is distended, correlation with any history of outlet obstruction or urinary retention.  5. Stable pulmonary nodules, please see exam 11/18/2023.            Physical Exam:   BP (!) 170/73 (BP Location: Left arm, Patient Position: Sitting, BP Method: Large (Automatic))   Pulse 69   Temp 97.8 °F (36.6 °C) (Oral)   Wt 52.8 kg (116 lb 6.5 oz)   SpO2 100%   BMI 19.37 kg/m²      ECOG Performance Status: (foot note - ECOG PS provided by [employer]cology [employer]) 1 - Symptomatic but completely ambulatory    Physical Exam  Constitutional:       General: She is not in acute  distress.     Appearance: She is normal weight.   HENT:      Head: Normocephalic.      Mouth/Throat:      Mouth: Mucous membranes are moist.      Pharynx: Oropharynx is clear.   Eyes:      General: No scleral icterus.     Conjunctiva/sclera: Conjunctivae normal.      Pupils: Pupils are equal, round, and reactive to light.   Cardiovascular:      Rate and Rhythm: Normal rate and regular rhythm.      Heart sounds: No murmur heard.  Pulmonary:      Effort: Pulmonary effort is normal. No respiratory distress.      Breath sounds: Normal breath sounds.   Abdominal:      General: There is no distension.      Palpations: Abdomen is soft.   Musculoskeletal:         General: Normal range of motion.      Cervical back: Normal range of motion and neck supple.      Right lower leg: No edema.      Left lower leg: No edema.   Lymphadenopathy:      Cervical: No cervical adenopathy.   Skin:     General: Skin is warm.      Coloration: Skin is not jaundiced.   Neurological:      General: No focal deficit present.      Mental Status: She is alert and oriented to person, place, and time.      Motor: No weakness.   Psychiatric:         Mood and Affect: Mood normal.         Behavior: Behavior normal.         Thought Content: Thought content normal.           Labs:   Lab Results   Component Value Date     06/05/2024    K 3.0 (L) 06/05/2024    CREATININE 1.2 06/05/2024    WBC 9.59 06/05/2024    HGB 9.5 (L) 06/05/2024     06/05/2024    AST 19 06/05/2024    ALT 15 06/05/2024    ALKPHOS 83 06/05/2024    BILITOT 0.4 06/05/2024          Imaging:     X-Ray Femur 2 View Left  Narrative: EXAMINATION:  XR FEMUR 2 VIEW LEFT    CLINICAL HISTORY:  Displaced intertrochanteric fracture of left femur, initial encounter for closed fracture    TECHNIQUE:  AP and lateral views of the left femur were performed.    COMPARISON:  04/11/2024    FINDINGS:  Intramedullary valeri and femoral head nail are in place.  Alignment of the intertrochanteric  fracture is excellent.  Lesser trochanter is  from the shaft.  Vascular calcification is noted.  Impression: See above    Electronically signed by: Evan Handley MD  Date:    05/15/2024  Time:    13:08            Diagnoses:       1. History of colon cancer    2. Carcinoid bronchial adenoma, right    3. Hypokalemia    4. Primary hypertension                        Assessment and Plan:     1. History of colon cancer  Overview:  Right sided colon cancer diagnosed in setting of LBO requiring urgent hemicolectomy 10/25/2022 with path showing pT4aN0 disease. CT chest 12/7/22 with possible lung metastases and MR liver with indeterminate liver lesions. 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 Patient elected for surveillance.      Assessment & Plan:  Anemia improving on oral iron supplement, and on further distance from hip surgery. Plan to repeat imaging 10/2024.  - FU 10/2024 with CT torso and labs  - Con't iron every other day - follow up iron levels today  - Follow up with PCP and ID as scheduled for ongoing management of edema and possible cellulitis      Orders:  -     CT Chest Abdomen Pelvis With IV Contrast (XPD) Routine Oral Contrast; Future; Expected date: 06/05/2024    2. Carcinoid bronchial adenoma, right  Overview:  Incidentally noted on imaging Undergoing surveillance 12/2022 and biopsy proven 01/2023. On imaging review appears indolent. Plan for surveillance      3. Hypokalemia  -     potassium chloride SA (K-DUR,KLOR-CON) 20 MEQ tablet; Take 1 tablet (20 mEq total) by mouth 2 (two) times daily.  Dispense: 60 tablet; Refill: 2    4. Primary hypertension  Overview:  Home medications include losartan                           Route Chart for Scheduling    Med Onc Chart Routing      Follow up with physician 4 months.   Follow up with DOMONI[facility]    Infusion scheduling note    Injection scheduling note    Labs CBC, CMP  and CEA   Scheduling:  Preferred lab:  Lab interval:     Imaging CT chest abdomen pelvis      Pharmacy appointment    Other referrals                    Therapy Plan Information  Medications  denosumab (PROLIA) injection 60 mg  60 mg, Subcutaneous, Every 26 weeks       Nakul English MD  Hematology/Oncology  [facility]

## 2024-06-05 NOTE — ASSESSMENT & PLAN NOTE
Anemia improving on oral iron supplement, and on further distance from hip surgery. Plan to repeat imaging 10/2024.  - FU 10/2024 with CT torso and labs  - Con't iron every other day - follow up iron levels today  - Follow up with PCP and ID as scheduled for ongoing management of edema and possible cellulitis

## 2024-06-06 DIAGNOSIS — S72.142A CLOSED 2-PART INTERTROCHANTERIC FRACTURE OF LEFT FEMUR, INITIAL ENCOUNTER: Primary | ICD-10-CM

## 2024-06-06 DIAGNOSIS — M54.16 LUMBAR RADICULOPATHY: Primary | ICD-10-CM

## 2024-06-12 ENCOUNTER — OFFICE VISIT (OUTPATIENT)
Dept: INFECTIOUS DISEASES | Facility: CLINIC | Age: 89
End: 2024-06-12
Payer: MEDICARE

## 2024-06-12 VITALS
WEIGHT: 113.31 LBS | HEIGHT: 65 IN | SYSTOLIC BLOOD PRESSURE: 169 MMHG | DIASTOLIC BLOOD PRESSURE: 77 MMHG | HEART RATE: 77 BPM | BODY MASS INDEX: 18.88 KG/M2 | TEMPERATURE: 98 F

## 2024-06-12 DIAGNOSIS — M79.89 LEG SWELLING: Primary | ICD-10-CM

## 2024-06-12 DIAGNOSIS — L03.90 CELLULITIS, UNSPECIFIED CELLULITIS SITE: ICD-10-CM

## 2024-06-12 PROCEDURE — 99214 OFFICE O/P EST MOD 30 MIN: CPT | Mod: PBBFAC | Performed by: INTERNAL MEDICINE

## 2024-06-12 PROCEDURE — 99214 OFFICE O/P EST MOD 30 MIN: CPT | Mod: S$PBB,,, | Performed by: INTERNAL MEDICINE

## 2024-06-12 PROCEDURE — 99999 PR PBB SHADOW E&M-EST. PATIENT-LVL IV: CPT | Mod: PBBFAC,,, | Performed by: INTERNAL MEDICINE

## 2024-06-12 NOTE — PROGRESS NOTES
"Infectious Disease Clinic Note  06/14/2024       Subjective:       Patient ID: Sussy Costa is a 88 y.o. female being seen for an new visit.    Chief Complaint: Follow-up    HPI  87y/o F pt with hx of T2DM, HLD, HLD and asthma presents for follow-up of b/l LE cellulitis.    Interval hx 6/13/24:  Tolerated antibiotics. Reports some improvement in leg burning. Still has some redness. Denies fevers, chills, sweats.       5/27/24:  Reports she presented with erythematous skin on legs a "while back." Also noted edema. It got worse while she was in the hospital in march. She was admitted following a fall that resulted on a hip fracture s/p intramedullary valeri insertion 3/30/24.     She has now received 2 courses of doxycycline. Last dose Saturday 5/25. Tolerated well. Thinks abx may have helped, but notes lasix is also helping.     Denies fevers, chills, night sweats. Still experiencing burning sensation on anterior legs b/l.    4/29/24- US no evidence of DVTs    PCN allergy- rash. Has tolerated ceftriaxone in the past.   Family History   Problem Relation Name Age of Onset    Heart disease Father      Heart disease Brother      Hypertension Brother       Social History     Socioeconomic History    Marital status:      Spouse name: sania    Number of children: 1   Occupational History    Occupation:    Tobacco Use    Smoking status: Never     Passive exposure: Never    Smokeless tobacco: Never   Substance and Sexual Activity    Alcohol use: Yes     Comment: socially    Drug use: No    Sexual activity: Yes     Partners: Male     Birth control/protection: Post-menopausal   Social History Narrative    [place]ives in [place] with  Sania. Daughter passed away in 2017 with leukemia. Two adult grandchildren. Former .     Social Determinants of Health     Financial Resource Strain: Low Risk  (4/30/2024)    Overall Financial Resource Strain (CARDIA)     Difficulty of Paying Living " Expenses: Not hard at all   Food Insecurity: No Food Insecurity (6/5/2024)    Hunger Vital Sign     Worried About Running Out of Food in the Last Year: Never true     Ran Out of Food in the Last Year: Never true   Transportation Needs: No Transportation Needs (4/30/2024)    PRAPARE - Transportation     Lack of Transportation (Medical): No     Lack of Transportation (Non-Medical): No   Physical Activity: Insufficiently Active (4/30/2024)    Exercise Vital Sign     Days of Exercise per Week: 7 days     Minutes of Exercise per Session: 20 min   Stress: No Stress Concern Present (4/30/2024)    [employer] [place] [employer] - Occupational Stress Questionnaire     Feeling of Stress : Only a little   Housing Stability: Low Risk  (4/30/2024)    Housing Stability Vital Sign     Unable to Pay for Housing in the Last Year: No     Homeless in the Last Year: No     Past Surgical History:   Procedure Laterality Date    BRONCHOSCOPY N/A 5/13/2022    Procedure: Bronchoscopy;  Surgeon: [facility] Diagnostic Provider;  Location: [facility] OR [address] [place] [address];  Service: Anesthesiology;  Laterality: N/A;    CATARACT EXTRACTION, BILATERAL      CHOLECYSTECTOMY      COLECTOMY, RIGHT Right 10/25/2022    Procedure: COLECTOMY, RIGHT;  Surgeon: Jass Holman MD;  Location: [facility] OR [address] [place] [address];  Service: Colon and Rectal;  Laterality: Right;    COLONOSCOPY N/A 6/26/2023    Procedure: COLONOSCOPY;  Surgeon: Jass Holman MD;  Location: [facility] ([address] [place] [address]);  Service: Endoscopy;  Laterality: N/A;  2nd floor -lung disease  referral Dr [id number] portal/mailed-GT  6/20/23- [place] confirmed- [place]    ENDOSCOPIC ULTRASOUND OF UPPER GASTROINTESTINAL TRACT N/A 4/22/2022    Procedure: ULTRASOUND, UPPER GI TRACT, ENDOSCOPIC;  Surgeon: Max Rosado MD;  [facility]  Service: Endoscopy;  Laterality: N/A;  urgent EUS (linear) for abnormal CT scan with dilated PD and increased cyst of pancreas cyst.  pap 44  mmHg  4/13:fully vaccinated. instructions via portal-SC    EPIDURAL STEROID INJECTION INTO LUMBAR SPINE N/A 11/8/2018    Procedure: Injection-steroid-epidural-lumbar L5-S1;  Surgeon: Nicci Osorio Jr., MD;  Location: [facility] PAIN MGT;  Service: Pain Management;  Laterality: N/A;    FUNCTIONAL ENDOSCOPIC SINUS SURGERY (FESS) USING COMPUTER-ASSISTED NAVIGATION N/A 6/17/2019    Procedure: FESS, USING COMPUTER-ASSISTED NAVIGATION;  Surgeon: Rosangela Isabel MD;  Location: [facility] OR;  Service: ENT;  Laterality: N/A;  video    HYSTERECTOMY      INTRAMEDULLARY RODDING OF FEMUR Left 3/31/2024    Procedure: INSERTION, INTRAMEDULLARY CAYDEN, FEMUR - Left,;  Surgeon: Nakul Walker MD;  Location: [address];  Service: Orthopedics;  Laterality: Left;    nasal polyps         Patient's Medications   New Prescriptions    No medications on file   Previous Medications    ACETAMINOPHEN (TYLENOL) 500 MG TABLET    Take 2 tablets (1,000 mg total) by mouth every 8 (eight) hours.    ALBUTEROL (PROVENTIL) 2.5 MG /3 ML (0.083 %) NEBULIZER SOLUTION    Take 3 mLs (2.5 mg total) by nebulization every 6 (six) hours as needed for Wheezing or Shortness of Breath. Rescue    ASCORBIC ACID, VITAMIN C, (VITAMIN C) 500 MG TABLET    Take 500 mg by mouth once daily.    ATORVASTATIN (LIPITOR) 20 MG TABLET    Take 1 tablet (20 mg total) by mouth every evening.    CALCIUM CARBONATE (TUMS) 200 MG CALCIUM (500 MG) CHEWABLE TABLET    Take 1 tablet (500 mg total) by mouth 3 (three) times daily as needed for Heartburn.    CETIRIZINE (ZYRTEC) 5 MG TABLET    Take 1 tablet (5 mg total) by mouth every evening.    DENOSUMAB (PROLIA) 60 MG/ML SYRG    Inject 1 mL (60 mg total) into the skin every 6 (six) months. Hold at rehab    FLUTICASONE-UMECLIDIN-VILANTER (TRELEGY ELLIPTA) 200-62.5-25 MCG INHALER    Inhale 1 puff into the lungs once daily. Hold at reh if not on formulary    FUROSEMIDE (LASIX) 20 MG TABLET    Take 1 tablet (20 mg total) by mouth  2 (two) times daily.    INSULIN ASPART U-100 (NOVOLOG FLEXPEN U-100 INSULIN) 100 UNIT/ML (3 ML) INPN PEN    Inject 5 Units into the skin 3 (three) times daily with meals.    INSULIN ASPART U-100 (NOVOLOG) 100 UNIT/ML (3 ML) INPN PEN    Inject 0-5 Units into the skin before meals and at bedtime as needed (Hyperglycemia).    INSULIN GLARGINE U-100, LANTUS, (LANTUS SOLOSTAR U-100 INSULIN) 100 UNIT/ML (3 ML) INPN PEN    Inject 6 Units into the skin once daily AND 5 Units every evening.    LATANOPROST 0.005 % OPHTHALMIC SOLUTION    Inject into the eye. 1 Drops Ophthalmic Every evening    LIPASE-PROTEASE-AMYLASE 24,000-76,000-120,000 UNITS (CREON) 24,000-76,000 -120,000 UNIT CAPSULE    Take 2 capsules with meals orally and 1 capsule with snacks.    LOSARTAN (COZAAR) 50 MG TABLET    Take 1 tablet (50 mg total) by mouth once daily.    MAGNESIUM OXIDE (MAG-OX) 400 MG (241.3 MG MAGNESIUM) TABLET    Take 400 mg by mouth once daily.    MONTELUKAST (SINGULAIR) 10 MG TABLET    Take 1 tablet (10 mg total) by mouth every evening.    OLOPATADINE (PATANOL) 0.1 % OPHTHALMIC SOLUTION    Place 1 drop into both eyes 2 (two) times daily as needed for Allergies. 1 Drops Ophthalmic Twice a day    ONDANSETRON (ZOFRAN-ODT) 8 MG TBDL    Take 1 tablet (8 mg total) by mouth every 6 (six) hours as needed.    POTASSIUM CHLORIDE SA (K-DUR,KLOR-CON) 20 MEQ TABLET    Take 1 tablet (20 mEq total) by mouth 2 (two) times daily.    PREGABALIN (LYRICA) 25 MG CAPSULE    Take 1 capsule (25 mg total) by mouth 2 (two) times daily.    SENNA-DOCUSATE 8.6-50 MG (PERICOLACE) 8.6-50 MG PER TABLET    Take 1 tablet by mouth 2 (two) times daily.    VITAMIN D (VITAMIN D3) 1000 UNITS TAB    Take 1,000 Units by mouth once daily.   Modified Medications    No medications on file   Discontinued Medications    No medications on file       Patient Active Problem List    Diagnosis Date Noted    Body mass index (BMI) less than 19 04/01/2024    Thrombocytopenia 04/01/2024     Skin tear of hand without complication, left, initial encounter 04/01/2024    Hypomagnesemia 03/31/2024    Acute blood loss anemia 03/31/2024    Vitamin D insufficiency 03/31/2024    Constipation 03/31/2024    ADARSH (acute kidney injury) 03/31/2024    Closed 2-part intertrochanteric fracture of left femur 03/30/2024    Balance problem 02/19/2024    Stage 3 chronic kidney disease 12/07/2023    Abdominal pain 11/18/2023    Other chronic pancreatitis 03/01/2023    Paroxysmal atrial fibrillation 03/01/2023    Aortic atherosclerosis 03/01/2023    Carcinoid bronchial adenoma, 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 On imaging review appears indolent. Plan for surveillance      Pancreatic insufficiency 12/09/2022     Diagnosed in setting of weight loss and bowel incontinence 2022. Has chronic pancreatitis on imaging. Home medications include Creon      Type 2 diabetes mellitus with microalbuminuria, without long-term current use of insulin 11/30/2022     Home regimen includes levemir and Novolog.       Right hip pain 11/02/2022    Impaired mobility 11/01/2022    History of colon cancer 10/28/2022     Right sided colon cancer diagnosed in setting of LBO requiring urgent hemicolectomy 10/25/2022 with path showing [id number] disease. CT chest 12/7/22 with possible lung metastases and MR liver with indeterminate liver lesions. 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 Patient elected for surveillance.        Hypophosphatemia 10/27/2022    Asthma, moderate persistent 10/26/2022     Well controlled on Trelegy, singulair, and albuterol prn.      Diastolic dysfunction 08/15/2021     Last TTE 07/2022. Continues on furosemide 20mg po daily.     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   HENT:  Negative for congestion and sore throat.    Eyes:  Negative for blurred vision and double vision.   Respiratory:  Negative for cough and shortness of breath.    Cardiovascular:  Negative for chest pain and palpitations.   Gastrointestinal:  Negative for diarrhea and vomiting.   Musculoskeletal:  Negative for myalgias and neck pain.        +b/l leg swelling and erythema   Skin:  Negative for itching and rash.   Neurological:  Negative for dizziness and headaches.   Psychiatric/Behavioral:  Negative for substance abuse. The patient is not nervous/anxious.    All other systems reviewed and are negative.          Objective:      BP (!) 169/77 (BP Location: Left arm)   Pulse 77   Temp 97.8 °F (36.6 °C) (Oral)   Ht 5' 5" (1.651 m)   Wt 51.4 kg (113 lb 5.1 oz)   BMI 18.86 kg/m²   Estimated body mass index is 18.86 kg/m² as calculated from the following:    Height as of this encounter: 5' 5" (1.651 m).    Weight as of this encounter: 51.4 kg (113 lb 5.1 oz).    Physical Exam  Constitutional:       General: She is not in acute distress.     Appearance: She is well-developed.   HENT:      Head: Normocephalic and atraumatic.   Eyes:      Conjunctiva/sclera: Conjunctivae normal.      Pupils: Pupils are equal, round, and reactive to light.   Cardiovascular:      Rate and Rhythm: Normal rate and regular rhythm.      Heart sounds: Normal heart sounds.   Pulmonary:      Effort: Pulmonary effort is normal. No respiratory distress.      Breath sounds: Normal breath sounds.   Abdominal:      General: Bowel sounds are normal. " There is no distension.      Palpations: Abdomen is soft.   Musculoskeletal:         General: Normal range of motion.      Cervical back: Normal range of motion and neck supple.      Right lower leg: Edema present.      Left lower leg: Edema present.   Skin:     General: Skin is warm and dry.      Comments: Skin is erythematous and warm anteriorly on both legs (improved)   Neurological:      General: No focal deficit present.      Mental Status: She is alert and oriented to person, place, and time.      Cranial Nerves: No cranial nerve deficit.   Psychiatric:         Mood and Affect: Mood normal.         Behavior: Behavior normal.       Assessment:           Leg swelling  cellulitis      Plan:       Sussy was seen today for follow-up.    Diagnoses and all orders for this visit:    Leg swelling  87y/o F with numerous comorbidities and recent L hip fracture presents for b/l lower extremity erythema concerning for cellulitis. She has completed various rounds of antibiotics. Notes some improvement in sensation of skin burning with last round of cefadroxil. Suspect residual erythema likely from venous insufficiency.    Plan:  --monitor off antibiotics  --counseled on signs and symptoms of infection and advised to notify me if these were to arise  --recommend elevating legs and wearing compression stocking for management of b/l LE edema    RTC PRN    Sabiha Goel MD  Infectious Disease     Total professional time spent for the encounter: 30 minutes  Time was spent preparing to see the patient, reviewing results of prior testing, obtaining and/or reviewing separately obtained history, performing a medically appropriate examination and interview, counseling and educating the patient/family, ordering medications/tests/procedures, referring and communicating with other health care professionals, documenting clinical information in the electronic health record, and independently interpreting results.

## 2024-06-14 ENCOUNTER — PATIENT OUTREACH (OUTPATIENT)
Dept: ADMINISTRATIVE | Facility: HOSPITAL | Age: 89
End: 2024-06-14
Payer: MEDICARE

## 2024-06-14 ENCOUNTER — DOCUMENT SCAN (OUTPATIENT)
Dept: HOME HEALTH SERVICES | Facility: HOSPITAL | Age: 89
End: 2024-06-14
Payer: MEDICARE

## 2024-06-14 NOTE — LETTER
AUTHORIZATION FOR RELEASE OF   CONFIDENTIAL INFORMATION    Dear Dr. Martines,    We are seeing Sussy Costa, date of birth 1935, in the clinic at [facility]. Leon Ramírez MD is the patient's PCP. Sussy Costa has an outstanding lab/procedure at the time we reviewed her chart. In order to help keep her health information updated, she has authorized us to request the following medical record(s):        (  )  MAMMOGRAM                                      (  )  COLONOSCOPY      (  )  PAP SMEAR                                          (  )  OUTSIDE LAB RESULTS     (  )  DEXA SCAN                                          ( x )  EYE EXAM            (  )  FOOT EXAM                                          (  )  ENTIRE RECORD     (  )  OUTSIDE IMMUNIZATIONS                 (  )  _______________         Please fax records to Leon Ramírez MD, [phone]     If you have any questions, please contact T[facility] Morton at [phone].           Patient Name: Sussy Costa  : 1935  Patient Phone #: [phone]

## 2024-06-18 ENCOUNTER — DOCUMENT SCAN (OUTPATIENT)
Dept: HOME HEALTH SERVICES | Facility: HOSPITAL | Age: 89
End: 2024-06-18
Payer: MEDICARE

## 2024-06-19 ENCOUNTER — OFFICE VISIT (OUTPATIENT)
Dept: INTERNAL MEDICINE | Facility: CLINIC | Age: 89
End: 2024-06-19
Payer: MEDICARE

## 2024-06-19 ENCOUNTER — LAB VISIT (OUTPATIENT)
Dept: LAB | Facility: HOSPITAL | Age: 89
End: 2024-06-19
Attending: INTERNAL MEDICINE
Payer: MEDICARE

## 2024-06-19 VITALS
HEART RATE: 94 BPM | TEMPERATURE: 98 F | RESPIRATION RATE: 18 BRPM | SYSTOLIC BLOOD PRESSURE: 130 MMHG | WEIGHT: 109.56 LBS | BODY MASS INDEX: 18.23 KG/M2 | OXYGEN SATURATION: 99 % | DIASTOLIC BLOOD PRESSURE: 60 MMHG

## 2024-06-19 DIAGNOSIS — E11.29 TYPE 2 DIABETES MELLITUS WITH MICROALBUMINURIA, WITHOUT LONG-TERM CURRENT USE OF INSULIN: Primary | Chronic | ICD-10-CM

## 2024-06-19 DIAGNOSIS — I70.0 AORTIC ATHEROSCLEROSIS: ICD-10-CM

## 2024-06-19 DIAGNOSIS — I48.0 PAROXYSMAL ATRIAL FIBRILLATION: ICD-10-CM

## 2024-06-19 DIAGNOSIS — I10 PRIMARY HYPERTENSION: ICD-10-CM

## 2024-06-19 DIAGNOSIS — I51.89 DIASTOLIC DYSFUNCTION: Chronic | ICD-10-CM

## 2024-06-19 DIAGNOSIS — E87.6 HYPOKALEMIA: ICD-10-CM

## 2024-06-19 DIAGNOSIS — N18.32 STAGE 3B CHRONIC KIDNEY DISEASE: ICD-10-CM

## 2024-06-19 DIAGNOSIS — E78.5 HYPERLIPIDEMIA ASSOCIATED WITH TYPE 2 DIABETES MELLITUS: Chronic | ICD-10-CM

## 2024-06-19 DIAGNOSIS — D69.6 THROMBOCYTOPENIA: Chronic | ICD-10-CM

## 2024-06-19 DIAGNOSIS — R80.9 TYPE 2 DIABETES MELLITUS WITH MICROALBUMINURIA, WITHOUT LONG-TERM CURRENT USE OF INSULIN: Primary | Chronic | ICD-10-CM

## 2024-06-19 DIAGNOSIS — E11.69 HYPERLIPIDEMIA ASSOCIATED WITH TYPE 2 DIABETES MELLITUS: Chronic | ICD-10-CM

## 2024-06-19 PROBLEM — I15.2 HYPERTENSION ASSOCIATED WITH DIABETES: Status: RESOLVED | Noted: 2017-11-09 | Resolved: 2024-06-19

## 2024-06-19 PROBLEM — E11.59 HYPERTENSION ASSOCIATED WITH DIABETES: Status: RESOLVED | Noted: 2017-11-09 | Resolved: 2024-06-19

## 2024-06-19 PROBLEM — N18.30 STAGE 3 CHRONIC KIDNEY DISEASE: Chronic | Status: ACTIVE | Noted: 2023-12-07

## 2024-06-19 PROBLEM — N17.9 AKI (ACUTE KIDNEY INJURY): Status: RESOLVED | Noted: 2024-03-31 | Resolved: 2024-06-19

## 2024-06-19 LAB
ALBUMIN SERPL BCP-MCNC: 3.5 G/DL (ref 3.5–5.2)
ALP SERPL-CCNC: 76 U/L (ref 55–135)
ALT SERPL W/O P-5'-P-CCNC: 16 U/L (ref 10–44)
ANION GAP SERPL CALC-SCNC: 9 MMOL/L (ref 8–16)
AST SERPL-CCNC: 18 U/L (ref 10–40)
BILIRUB SERPL-MCNC: 0.3 MG/DL (ref 0.1–1)
BUN SERPL-MCNC: 24 MG/DL (ref 8–23)
CALCIUM SERPL-MCNC: 9.1 MG/DL (ref 8.7–10.5)
CHLORIDE SERPL-SCNC: 113 MMOL/L (ref 95–110)
CO2 SERPL-SCNC: 20 MMOL/L (ref 23–29)
CREAT SERPL-MCNC: 1.2 MG/DL (ref 0.5–1.4)
EST. GFR  (NO RACE VARIABLE): 43.8 ML/MIN/1.73 M^2
GLUCOSE SERPL-MCNC: 127 MG/DL (ref 70–110)
MAGNESIUM SERPL-MCNC: 1.8 MG/DL (ref 1.6–2.6)
POTASSIUM SERPL-SCNC: 4.6 MMOL/L (ref 3.5–5.1)
PROT SERPL-MCNC: 6.4 G/DL (ref 6–8.4)
SODIUM SERPL-SCNC: 142 MMOL/L (ref 136–145)

## 2024-06-19 PROCEDURE — 99215 OFFICE O/P EST HI 40 MIN: CPT | Mod: PBBFAC,PO | Performed by: INTERNAL MEDICINE

## 2024-06-19 PROCEDURE — 83735 ASSAY OF MAGNESIUM: CPT | Performed by: INTERNAL MEDICINE

## 2024-06-19 PROCEDURE — 99214 OFFICE O/P EST MOD 30 MIN: CPT | Mod: S$PBB,,, | Performed by: INTERNAL MEDICINE

## 2024-06-19 PROCEDURE — 36415 COLL VENOUS BLD VENIPUNCTURE: CPT | Mod: PO | Performed by: INTERNAL MEDICINE

## 2024-06-19 PROCEDURE — G2211 COMPLEX E/M VISIT ADD ON: HCPCS | Mod: S$PBB,,, | Performed by: INTERNAL MEDICINE

## 2024-06-19 PROCEDURE — 99999 PR PBB SHADOW E&M-EST. PATIENT-LVL V: CPT | Mod: PBBFAC,,, | Performed by: INTERNAL MEDICINE

## 2024-06-19 PROCEDURE — 80053 COMPREHEN METABOLIC PANEL: CPT | Performed by: INTERNAL MEDICINE

## 2024-06-19 NOTE — PROGRESS NOTES
Subjective:       Patient ID: Sussy Costa is a 88 y.o. female.    Chief Complaint: Follow-up    HPI    88-year-old female with stage IIIB chronic kidney disease, thrombocytopenia here for follow-up.    Diabetes-Lantus 6 units a.m. and 5 units p.m., NovoLog 4 am, 5 lunch, 5 pm units 3 times daily plus sliding scale insulin. She is checking her BG.  She reports that it has been running good.  It has been 110s-120s.  At night, it is in the low 200s.  Lab Results   Component Value Date    HGBA1C 5.9 (H) 05/08/2024    HGBA1C 7.4 (H) 03/30/2024    HGBA1C 7.9 (H) 01/03/2024     Lab Results   Component Value Date    LDLCALC 62.8 (L) 09/19/2023    CREATININE 1.2 06/05/2024     HLD/aortic atherosclerosis - Patient is currently on Lipitor 20 mg.  Her last lipid panel was   Cholesterol   Date Value Ref Range Status   09/19/2023 124 120 - 199 mg/dL Final     Comment:     The National Cholesterol Education Program (NCEP) has set the  following guidelines (reference ranges) for Cholesterol:  Optimal.....................<200 mg/dL  Borderline High.............200-239 mg/dL  High........................> or = 240 mg/dL       Triglycerides   Date Value Ref Range Status   09/19/2023 41 30 - 150 mg/dL Final     Comment:     The National Cholesterol Education Program (NCEP) has set the  following guidelines (reference values) for triglycerides:  Normal......................<150 mg/dL  Borderline High.............150-199 mg/dL  High........................200-499 mg/dL       HDL   Date Value Ref Range Status   09/19/2023 53 40 - 75 mg/dL Final     Comment:     The National Cholesterol Education Program (NCEP) has set the  following guidelines (reference values) for HDL Cholesterol:  Low...............<40 mg/dL  Optimal...........>60 mg/dL       LDL Cholesterol   Date Value Ref Range Status   09/19/2023 62.8 (L) 63.0 - 159.0 mg/dL Final     Comment:     The National Cholesterol Education Program (NCEP) has set the  following  guidelines (reference values) for LDL Cholesterol:  Optimal.......................<130 mg/dL  Borderline High...............130-159 mg/dL  High..........................160-189 mg/dL  Very High.....................>190 mg/dL     .  Side effects of the medication: none.    Patient has diastolic dysfunction and is on Lasix 20 mg b.i.d.. Her legs have gone down.  She has lasix from Dr. English.  Her legs are back to a normal size.  Her legs are much better than they were before.    Patient has atrial fibrillation and is on no current medication.    Review of Systems      Objective:      Physical Exam  Vitals reviewed.   Constitutional:       Appearance: She is well-developed.   HENT:      Head: Normocephalic and atraumatic.      Mouth/Throat:      Pharynx: No oropharyngeal exudate.   Eyes:      General: No scleral icterus.        Right eye: No discharge.         Left eye: No discharge.      Pupils: Pupils are equal, round, and reactive to light.   Neck:      Thyroid: No thyromegaly.      Trachea: No tracheal deviation.   Cardiovascular:      Rate and Rhythm: Normal rate and regular rhythm.      Heart sounds: Normal heart sounds. No murmur heard.     No friction rub. No gallop.   Pulmonary:      Effort: Pulmonary effort is normal. No respiratory distress.      Breath sounds: Normal breath sounds. No wheezing or rales.   Chest:      Chest wall: No tenderness.   Abdominal:      General: Bowel sounds are normal. There is no distension.      Palpations: Abdomen is soft. There is no mass.      Tenderness: There is no abdominal tenderness. There is no guarding or rebound.   Musculoskeletal:         General: No tenderness. Normal range of motion.      Cervical back: Normal range of motion and neck supple.   Skin:     General: Skin is warm and dry.      Coloration: Skin is not pale.      Findings: No erythema or rash.   Neurological:      Mental Status: She is alert and oriented to person, place, and time.   Psychiatric:          Behavior: Behavior normal.         Assessment:       1. Type 2 diabetes mellitus with microalbuminuria, without long-term current use of insulin    2. Primary hypertension    3. Hyperlipidemia associated with type 2 diabetes mellitus    4. Aortic atherosclerosis    5. Diastolic dysfunction    6. Paroxysmal atrial fibrillation    7. Stage 3b chronic kidney disease    8. Thrombocytopenia    9. Hypokalemia  - Comprehensive Metabolic Panel; Future  - Magnesium; Future      Plan:       1. Continue Lantus 60 units a.m., 5 units p.m., NovoLog 4 units a.m., 5 units lunch, 4 units p.m..  2.  Continue losartan 50 mg  3/4.  Continue Lipitor 20 mg p.o.   5. Continue Lasix 20 mg p.o.   6. Monitor.    7.  Monitor   8.  Monitor   9.  Check CMP, magnesium.

## 2024-06-21 ENCOUNTER — OFFICE VISIT (OUTPATIENT)
Dept: CARDIOLOGY | Facility: CLINIC | Age: 89
End: 2024-06-21
Payer: MEDICARE

## 2024-06-21 VITALS
HEART RATE: 84 BPM | WEIGHT: 113.56 LBS | OXYGEN SATURATION: 97 % | HEIGHT: 65 IN | BODY MASS INDEX: 18.92 KG/M2 | DIASTOLIC BLOOD PRESSURE: 69 MMHG | SYSTOLIC BLOOD PRESSURE: 160 MMHG

## 2024-06-21 DIAGNOSIS — I10 PRIMARY HYPERTENSION: Chronic | ICD-10-CM

## 2024-06-21 DIAGNOSIS — I51.89 DIASTOLIC DYSFUNCTION: Chronic | ICD-10-CM

## 2024-06-21 DIAGNOSIS — E78.5 HYPERLIPIDEMIA ASSOCIATED WITH TYPE 2 DIABETES MELLITUS: Chronic | ICD-10-CM

## 2024-06-21 DIAGNOSIS — E87.6 HYPOKALEMIA: ICD-10-CM

## 2024-06-21 DIAGNOSIS — R80.9 TYPE 2 DIABETES MELLITUS WITH MICROALBUMINURIA, WITHOUT LONG-TERM CURRENT USE OF INSULIN: ICD-10-CM

## 2024-06-21 DIAGNOSIS — I48.0 PAROXYSMAL ATRIAL FIBRILLATION: Chronic | ICD-10-CM

## 2024-06-21 DIAGNOSIS — I70.0 AORTIC ATHEROSCLEROSIS: Chronic | ICD-10-CM

## 2024-06-21 DIAGNOSIS — J90 PLEURAL EFFUSION: ICD-10-CM

## 2024-06-21 DIAGNOSIS — I50.32 CHRONIC HEART FAILURE WITH PRESERVED EJECTION FRACTION (HFPEF): Primary | ICD-10-CM

## 2024-06-21 DIAGNOSIS — E11.29 TYPE 2 DIABETES MELLITUS WITH MICROALBUMINURIA, WITHOUT LONG-TERM CURRENT USE OF INSULIN: ICD-10-CM

## 2024-06-21 DIAGNOSIS — E11.69 HYPERLIPIDEMIA ASSOCIATED WITH TYPE 2 DIABETES MELLITUS: Chronic | ICD-10-CM

## 2024-06-21 DIAGNOSIS — R60.9 EDEMA, UNSPECIFIED TYPE: ICD-10-CM

## 2024-06-21 PROCEDURE — 99215 OFFICE O/P EST HI 40 MIN: CPT | Mod: PBBFAC | Performed by: STUDENT IN AN ORGANIZED HEALTH CARE EDUCATION/TRAINING PROGRAM

## 2024-06-21 PROCEDURE — 99999 PR PBB SHADOW E&M-EST. PATIENT-LVL V: CPT | Mod: PBBFAC,GC,, | Performed by: STUDENT IN AN ORGANIZED HEALTH CARE EDUCATION/TRAINING PROGRAM

## 2024-06-21 RX ORDER — LOSARTAN POTASSIUM 100 MG/1
100 TABLET ORAL DAILY
Qty: 90 TABLET | Refills: 3 | Status: SHIPPED | OUTPATIENT
Start: 2024-06-21 | End: 2025-06-21

## 2024-06-21 RX ORDER — FUROSEMIDE 20 MG/1
20 TABLET ORAL DAILY PRN
Qty: 30 TABLET | Refills: 11 | Status: SHIPPED | OUTPATIENT
Start: 2024-06-21 | End: 2025-06-21

## 2024-06-21 RX ORDER — FUROSEMIDE 20 MG/1
20 TABLET ORAL DAILY
Qty: 30 TABLET | Refills: 11 | Status: SHIPPED | OUTPATIENT
Start: 2024-06-21 | End: 2024-06-21

## 2024-06-21 NOTE — PROGRESS NOTES
PCP - Leon Ramírez MD  Referring Physician:     Subjective:   Patient ID:  Sussy Costa is a 88 y.o. female who presents for evaluation and treatment of irregular heart beats.    Patient recently had hip surgery after fall with her leg giving out. No syncope/LOC. Home health nurse felt her pulse was irregular and recommended cardiology appointment. No history of atrial fibrillation although one EKG 10/2022 with possible Afib RVR that appears to be sinus tach with PACs. No treatment started at that time and patient was admitted for colectomy and not symptomatic from rhythm perspective then or currently.     BP elevated 160-170s on last couple clinic visits. Completing rehab post-op and completed DVT ppx eliquis 2.5mg BID.     Patient denies CP, SOB, dizziness, syncope, lightheadedness, palpitations, bleeding, or claudication.    History:     Social History     Tobacco Use    Smoking status: Never     Passive exposure: Never    Smokeless tobacco: Never   Substance Use Topics    Alcohol use: Yes     Comment: socially     Meds:     Review of patient's allergies indicates:   Allergen Reactions    Aspirin Other (See Comments)     Asthma    TRIAD OF NASAL POLYPS, ASA  ALLERGY AND ASTHMA.        Aspirin Other (See Comments)    Nsaids (non-steroidal anti-inflammatory drug) Other (See Comments)     AVOID DUE TO TRIAD OF ASA ALLERGY, NASAL POLYPS,AND ASTHMA  AVOID DUE TO TRIAD OF ASA ALLERGY, NASAL POLYPS,AND ASTHMA    Penicillin      Other reaction(s): Rash    9/30/20: tolerates ceftriaxone    Penicillin g Other (See Comments)     Other reaction(s): Rash    9/30/20: tolerates ceftriaxone     Current Outpatient Medications:     acetaminophen (TYLENOL) 500 MG tablet, Take 2 tablets (1,000 mg total) by mouth every 8 (eight) hours., Disp: , Rfl: 0    albuterol (PROVENTIL) 2.5 mg /3 mL (0.083 %) nebulizer solution, Take 3 mLs (2.5 mg total) by nebulization every 6 (six) hours as needed for Wheezing or Shortness of Breath.  Rescue, Disp: 60 each, Rfl: 0    ascorbic acid, vitamin C, (VITAMIN C) 500 MG tablet, Take 500 mg by mouth once daily., Disp: , Rfl:     atorvastatin (LIPITOR) 20 MG tablet, Take 1 tablet (20 mg total) by mouth every evening., Disp: 90 tablet, Rfl: 1    calcium carbonate (TUMS) 200 mg calcium (500 mg) chewable tablet, Take 1 tablet (500 mg total) by mouth 3 (three) times daily as needed for Heartburn., Disp: , Rfl:     cetirizine (ZYRTEC) 5 MG tablet, Take 1 tablet (5 mg total) by mouth every evening., Disp: , Rfl: 0    denosumab (PROLIA) 60 mg/mL Syrg, Inject 1 mL (60 mg total) into the skin every 6 (six) months. Hold at rehab, Disp: , Rfl:     fluticasone-umeclidin-vilanter (TRELEGY ELLIPTA) 200-62.5-25 mcg inhaler, Inhale 1 puff into the lungs once daily. Hold at reh if not on formulary, Disp: 3 each, Rfl: 4    insulin aspart U-100 (NOVOLOG FLEXPEN U-100 INSULIN) 100 unit/mL (3 mL) InPn pen, Inject 5 Units into the skin 3 (three) times daily with meals., Disp: 13.5 mL, Rfl: 3    insulin aspart U-100 (NOVOLOG) 100 unit/mL (3 mL) InPn pen, Inject 0-5 Units into the skin before meals and at bedtime as needed (Hyperglycemia)., Disp: , Rfl: 0    insulin glargine U-100, Lantus, (LANTUS SOLOSTAR U-100 INSULIN) 100 unit/mL (3 mL) InPn pen, Inject 6 Units into the skin once daily AND 5 Units every evening., Disp: 10 mL, Rfl: 3    latanoprost 0.005 % ophthalmic solution, Inject into the eye. 1 Drops Ophthalmic Every evening, Disp: , Rfl:     lipase-protease-amylase 24,000-76,000-120,000 units (CREON) 24,000-76,000 -120,000 unit capsule, Take 2 capsules with meals orally and 1 capsule with snacks., Disp: 300 capsule, Rfl: 11    magnesium oxide (MAG-OX) 400 mg (241.3 mg magnesium) tablet, Take 400 mg by mouth once daily., Disp: , Rfl:     montelukast (SINGULAIR) 10 mg tablet, Take 1 tablet (10 mg total) by mouth every evening., Disp: 30 tablet, Rfl: 6    olopatadine (PATANOL) 0.1 % ophthalmic solution, Place 1 drop into  "both eyes 2 (two) times daily as needed for Allergies. 1 Drops Ophthalmic Twice a day, Disp: , Rfl:     ondansetron (ZOFRAN-ODT) 8 MG TbDL, Take 1 tablet (8 mg total) by mouth every 6 (six) hours as needed., Disp: , Rfl:     senna-docusate 8.6-50 mg (PERICOLACE) 8.6-50 mg per tablet, Take 1 tablet by mouth 2 (two) times daily., Disp: , Rfl:     vitamin D (VITAMIN D3) 1000 units Tab, Take 1,000 Units by mouth once daily., Disp: , Rfl:     empagliflozin (JARDIANCE) 10 mg tablet, Take 1 tablet (10 mg total) by mouth once daily., Disp: 30 tablet, Rfl: 11    furosemide (LASIX) 20 MG tablet, Take 1 tablet (20 mg total) by mouth daily as needed., Disp: 30 tablet, Rfl: 11    losartan (COZAAR) 100 MG tablet, Take 1 tablet (100 mg total) by mouth once daily., Disp: 90 tablet, Rfl: 3    pregabalin (LYRICA) 25 MG capsule, Take 1 capsule (25 mg total) by mouth 2 (two) times daily., Disp: 60 capsule, Rfl: 0  Review of Systems   Constitutional:  Negative for chills, diaphoresis, fever and malaise/fatigue.   HENT:  Negative for sore throat.    Eyes:  Negative for double vision.   Respiratory:  Negative for cough, shortness of breath and wheezing.    Cardiovascular:  Negative for chest pain, palpitations, orthopnea, claudication, leg swelling and PND.   Gastrointestinal:  Negative for abdominal pain, blood in stool, constipation, heartburn, nausea and vomiting.   Genitourinary:  Negative for hematuria.   Musculoskeletal:  Negative for falls and myalgias.   Neurological:  Negative for dizziness, loss of consciousness, weakness and headaches.   Psychiatric/Behavioral:  The patient is not nervous/anxious.      Objective:   BP (!) 160/69   Pulse 84   Ht 5' 5" (1.651 m)   Wt 51.5 kg (113 lb 8.6 oz)   SpO2 97%   BMI 18.89 kg/m²   BMI Readings from Last 15 Encounters:   06/21/24 18.89 kg/m²   06/19/24 18.23 kg/m²   06/12/24 18.86 kg/m²   06/05/24 19.37 kg/m²   05/27/24 19.41 kg/m²   05/15/24 19.85 kg/m²   05/08/24 21.76 kg/m² "   04/29/24 21.23 kg/m²   04/19/24 19.46 kg/m²   04/18/24 18.30 kg/m²   04/16/24 15.41 kg/m²   03/30/24 15.41 kg/m²   03/22/24 17.94 kg/m²   03/05/24 18.12 kg/m²   02/26/24 17.98 kg/m²      Physical Exam  Constitutional:       General: She is not in acute distress.  HENT:      Head: Normocephalic and atraumatic.   Eyes:      Pupils: Pupils are equal, round, and reactive to light.   Neck:      Vascular: No carotid bruit or JVD.   Cardiovascular:      Rate and Rhythm: Normal rate and regular rhythm.      Heart sounds: Normal heart sounds. No murmur heard.     No friction rub. No gallop.   Pulmonary:      Effort: Pulmonary effort is normal. No respiratory distress.      Breath sounds: Normal breath sounds. No wheezing or rales.   Chest:      Chest wall: No tenderness.   Abdominal:      General: Bowel sounds are normal. There is no distension.      Palpations: Abdomen is soft.      Tenderness: There is no abdominal tenderness.   Musculoskeletal:      Right lower leg: No edema.      Left lower leg: No edema.   Skin:     General: Skin is warm and dry.      Findings: No erythema.   Neurological:      Mental Status: She is alert and oriented to person, place, and time.   Psychiatric:         Mood and Affect: Mood and affect normal.         Cognition and Memory: Memory normal.         Judgment: Judgment normal.       Labs:     Lab Results   Component Value Date     06/19/2024    K 4.6 06/19/2024     (H) 06/19/2024    CO2 20 (L) 06/19/2024    BUN 24 (H) 06/19/2024    CREATININE 1.2 06/19/2024    ANIONGAP 9 06/19/2024     Lab Results   Component Value Date    HGBA1C 5.9 (H) 05/08/2024     Lab Results   Component Value Date     (H) 03/30/2024     (H) 11/30/2022     (H) 07/05/2022    Lab Results   Component Value Date    WBC 9.59 06/05/2024    HGB 9.5 (L) 06/05/2024    HCT 31.0 (L) 06/05/2024    HCT 38 07/05/2022     06/05/2024    GRAN 5.7 06/05/2024     Lab Results   Component Value Date     CHOL 124 09/19/2023    HDL 53 09/19/2023    LDLCALC 62.8 (L) 09/19/2023    TRIG 41 09/19/2023          Cardiovascular Imaging:     Echo:   EF   Date Value Ref Range Status   07/06/2022 60 % Final   06/04/2021 65 % Final     Stress test: No results found for this or any previous visit.     [facility]:    Assessment & Plan:     1. Chronic heart failure with preserved ejection fraction (HFpEF)    2. Type 2 diabetes mellitus with microalbuminuria, without long-term current use of insulin    3. Edema, unspecified type    4. Primary hypertension    5. Hyperlipidemia associated with type 2 diabetes mellitus    6. Paroxysmal atrial fibrillation    7. Diastolic dysfunction    8. Aortic atherosclerosis    9. Pleural effusion    10. Hypokalemia      #HFpEF  -increase losartan to 100mg  -start jardiance 10mg  -lasix 20mg prn only    #Hypertension  -increase losartan     #    PREVENTION   For smokers: Educated and strongly counseled on smoking cessation.   Adopt a heart healthy diet  Counseled on various heart healthy diets  -Mediterranean diet- High in fruits vegetable, grains, fish, nuts and extra-virgin olive oil. Minimize processed, salty foods, packaged foods.  -Dash diet to lower HTN-Similar to mediterranean diet, low fat dairy, meatless meals and low sodium  -Plant based diet: Foundation being minimally processed and whole foods.   -Avoid foods in saturated fats and trans fats.   Aerobic exercise 30 minutes 5 times/ week.   Reduce and limit alcohol intake.   Learn stress management / relaxation techniques such as mindfulness and meditation.   Encourage self and family members to adopt healthy choices at a young age.     COMMUNICATION   Patients are encouraged to call clinic if they have any questions or concerns.   Clinic line is not for emergency purposes.  If they are feeling unwell and especially if they have certain red flag symptoms such as new or worsening  chest pain, chest pressure, shortness of breath, palpitations,  dizziness, fall (especially on anticoagulants),   low blood pressure, focal weakness, etc., they should go to the emergency room.      Case discussed with Dr. Bonilla. Unless there are intervening problems, patient should return for re-evaluation in 6 months.    Signed:  Noé Allen M.D.  Cardiovascular Fellow  [facility]

## 2024-06-21 NOTE — PROGRESS NOTES
I have reviewed the notes, assessments, and/or procedures performed this visit, and I concur with the documentation.    Questions of pt/ answered  Agree with plans

## 2024-06-26 ENCOUNTER — OFFICE VISIT (OUTPATIENT)
Dept: OTOLARYNGOLOGY | Facility: CLINIC | Age: 89
End: 2024-06-26
Payer: MEDICARE

## 2024-06-26 ENCOUNTER — DOCUMENT SCAN (OUTPATIENT)
Dept: HOME HEALTH SERVICES | Facility: HOSPITAL | Age: 89
End: 2024-06-26
Payer: MEDICARE

## 2024-06-26 VITALS
BODY MASS INDEX: 19.22 KG/M2 | WEIGHT: 115.5 LBS | HEART RATE: 94 BPM | DIASTOLIC BLOOD PRESSURE: 70 MMHG | SYSTOLIC BLOOD PRESSURE: 147 MMHG

## 2024-06-26 DIAGNOSIS — J33.8 POLYP OF PARANASAL SINUS: Chronic | ICD-10-CM

## 2024-06-26 DIAGNOSIS — J32.8 OTHER CHRONIC SINUSITIS: Primary | Chronic | ICD-10-CM

## 2024-06-26 DIAGNOSIS — J30.89 NON-SEASONAL ALLERGIC RHINITIS, UNSPECIFIED TRIGGER: Chronic | ICD-10-CM

## 2024-06-26 PROCEDURE — 31231 NASAL ENDOSCOPY DX: CPT | Mod: S$PBB,,, | Performed by: OTOLARYNGOLOGY

## 2024-06-26 PROCEDURE — 99999 PR PBB SHADOW E&M-EST. PATIENT-LVL III: CPT | Mod: PBBFAC,,, | Performed by: OTOLARYNGOLOGY

## 2024-06-26 PROCEDURE — 31231 NASAL ENDOSCOPY DX: CPT | Mod: PBBFAC,PN | Performed by: OTOLARYNGOLOGY

## 2024-06-26 PROCEDURE — 99213 OFFICE O/P EST LOW 20 MIN: CPT | Mod: PBBFAC,PN,25 | Performed by: OTOLARYNGOLOGY

## 2024-06-26 PROCEDURE — 99214 OFFICE O/P EST MOD 30 MIN: CPT | Mod: 25,S$PBB,, | Performed by: OTOLARYNGOLOGY

## 2024-06-26 RX ORDER — MONTELUKAST SODIUM 10 MG/1
10 TABLET ORAL NIGHTLY
Qty: 90 TABLET | Refills: 1 | Status: SHIPPED | OUTPATIENT
Start: 2024-06-26

## 2024-06-26 RX ORDER — LEVOCETIRIZINE DIHYDROCHLORIDE 5 MG/1
2.5 TABLET, FILM COATED ORAL NIGHTLY
Qty: 45 TABLET | Refills: 3 | Status: SHIPPED | OUTPATIENT
Start: 2024-06-26 | End: 2025-06-26

## 2024-06-26 NOTE — PROCEDURES
Procedures     PROCEDURE NOTE:  Nasal endoscopy   Preprocedure diagnosis:  Chronic sinusitis with polyposis  Postprocedure diangosis:  Same  Complications:  None  Blood Loss:  None    Procedure in detail:  After verbal consent was obtained, the patient's nasal cavity was anesthesized using topical 1%lidocaine and Neosynepherine.  A rigid 0 degree endoscope was placed in first the right, then the left nasal cavity.  The inferior and middle turbinates were examined, and found to be boggy with edema bilaterally.   The middle meatus and maxillary antrum was also examined, and found to be patent bilaterally. Sphenoidotomy patent bilaterally. + polypoid mucosal changes in the bilateral ethmoid cavities. No evidence of brittany polyps. No purulent drainage/crusting present today.  No masses noted.     The patient tolerated the procedure well and there were no complications.

## 2024-06-26 NOTE — PROGRESS NOTES
CC: follow up CRS    Subjective:      Sussy Costa is a 88 y.o. female who comes for follow-up for chronic rhinosinusitis with nasal polyposis(AERD) status-post revision endoscopic sinus surgery remotely. In interval time since our last visit, she did have hip fracture and was hospitalized.  She has mild  facial pain/pressure.  She does have some crusting with nasal rinses in the morning. She  is using xyzal, montelukast, and budesonide/mupirocin nasal saline rinses.     Objective:     BP (!) 147/70 (BP Location: Left arm, Patient Position: Sitting, BP Method: Medium (Automatic))   Pulse 94   Wt 52.4 kg (115 lb 8.3 oz)   BMI 19.22 kg/m²      Constitutional: Well appearing / communicating.  NAD.  Eyes: EOM I Bilaterally  Head/Face: Normocephalic.  Negative paranasal sinus pressure/tenderness.  Salivary glands WNL.  House Brackmann I Bilaterally.    Right Ear: External Auditory Canal WNL,TM w/o masses/lesions/perforations.  Auricle WNL.  Left Ear: External Auditory Canal WNL,TM w/o masses/lesions/perforations. Auricle WNL.  Nose: mild mucosal edema. No gross nasal septal deviation. Inferior Turbinates 3+ bilaterally. No septal perforation. No masses/lesions. External nasal skin without masses/lesions.  Oral Cavity: Gingiva/lips WNL.  FOM Soft, no masses palpated. Oral Tongue mobile. Hard Palate WNL.   Oropharynx: BOT WNL. No masses/lesions noted. Tonsillar fossa/pharyngeal wall without lesions. Posterior oropharynx WNL.  Soft palate without masses. Midline uvula.   Neck/Lymphatic: No LAD I-VI bilaterally.  No thyromegaly.  No masses noted on exam.      Procedure    Nasal endoscopy performed.  See procedure note.        Data Reviewed    Pathology report indicated chronic inflammation with eosinophilia.       Assessment:     1. Other chronic sinusitis    2. Non-seasonal allergic rhinitis, unspecified trigger    3. Polyp of paranasal sinus                 Plan:       Continue budesonide and mupirocin nasal  nebulized solution and nasal saline rinses.   Continue Singulair and Xyzal(refills sent).       Follow up in about 6 months (around 12/26/2024).     Rosangela Isabel MD

## 2024-06-28 ENCOUNTER — PATIENT OUTREACH (OUTPATIENT)
Dept: ADMINISTRATIVE | Facility: HOSPITAL | Age: 89
End: 2024-06-28
Payer: MEDICARE

## 2024-07-03 DIAGNOSIS — Z71.89 COMPLEX CARE COORDINATION: ICD-10-CM

## 2024-07-06 ENCOUNTER — DOCUMENT SCAN (OUTPATIENT)
Dept: HOME HEALTH SERVICES | Facility: HOSPITAL | Age: 89
End: 2024-07-06
Payer: MEDICARE

## 2024-07-24 DIAGNOSIS — J45.40 MODERATE PERSISTENT ASTHMA WITHOUT COMPLICATION: ICD-10-CM

## 2024-07-24 RX ORDER — FLUTICASONE FUROATE, UMECLIDINIUM BROMIDE AND VILANTEROL TRIFENATATE 200; 62.5; 25 UG/1; UG/1; UG/1
1 POWDER RESPIRATORY (INHALATION) DAILY
Start: 2024-07-24 | End: 2024-07-25 | Stop reason: SDUPTHER

## 2024-07-24 NOTE — TELEPHONE ENCOUNTER
----- Message from Darielabrandy Baker sent at 7/24/2024 11:45 AM CDT -----  Regarding: refill  Contact: [phone]  Type:  RX Refill Request    Who Called: Kami  called [facility]     2nd request  Refill or New Rx:refill  RX Name and Strength:fluticasone-umeclidin-vilanter (TRELEGY ELLIPTA) 200-62.5-25 mcg inhaler 3 each 4 4/2/2024 - No    How is the patient currently taking it? (ex. 1XDay):once morning   Is this a 30 day or 90 day RX  90 day   Preferred Pharmacy with phone number:    [facility] [place] [facility] AT [address] to [facility]  [place] [address]  Phone: [phone] Fax: [phone]      Local or Mail Order:Mail  Ordering Provider:Swetha  Would the patient rather a call back or a response via [facility]? both  Best Call Back Number:Kami [phone]  Additional Information:

## 2024-07-25 DIAGNOSIS — J45.40 MODERATE PERSISTENT ASTHMA WITHOUT COMPLICATION: ICD-10-CM

## 2024-07-25 RX ORDER — FLUTICASONE FUROATE, UMECLIDINIUM BROMIDE AND VILANTEROL TRIFENATATE 200; 62.5; 25 UG/1; UG/1; UG/1
1 POWDER RESPIRATORY (INHALATION) DAILY
Start: 2024-07-25

## 2024-07-29 ENCOUNTER — PATIENT MESSAGE (OUTPATIENT)
Dept: PODIATRY | Facility: CLINIC | Age: 89
End: 2024-07-29
Payer: MEDICARE

## 2024-07-29 DIAGNOSIS — J45.40 MODERATE PERSISTENT ASTHMA WITHOUT COMPLICATION: ICD-10-CM

## 2024-07-29 NOTE — TELEPHONE ENCOUNTER
----- Message from Susan Ty sent at 7/29/2024 12:01 PM CDT -----  Consult/Advisory    Name Of Caller:Sussy       Contact Preference:[phone]    Nature of call: Ptn called stating she had called regarding she stated she has been waiting on a call fluticasone-umeclidin-vilanter (TRELEGY ELLIPTA) 200- to be filled and the pharmacy hasn't rec the rx  it was a mail order pharmacy she stated please call to assist

## 2024-07-30 NOTE — TELEPHONE ENCOUNTER
----- Message from Gus LAQUITA Route sent at 7/30/2024  9:45 AM CDT -----  Regarding: Missed call  Contact: pt.  [phone]  Pt is calling I ref to a missed call about medication   fluticasone-umeclidin-vilanter (TRELEGY ELLIPTA) 200-62.5-25 mcg inhaler   She says she has not been able to get a refill on the medication.  Almost out of medication. Patient Requesting Call Back @  [phone]        [facility] - [place] - [facility] AT Portal to [facility]  [place] [address]  Phone: [phone] Fax: [phone]

## 2024-07-30 NOTE — TELEPHONE ENCOUNTER
Call was returned to patient in regards to her Rx. I informed patient that I sent her refill request to Dr Posada. Patient verbalized understanding.

## 2024-07-31 ENCOUNTER — OFFICE VISIT (OUTPATIENT)
Dept: PODIATRY | Facility: CLINIC | Age: 89
End: 2024-07-31
Payer: MEDICARE

## 2024-07-31 VITALS
HEIGHT: 65 IN | HEART RATE: 99 BPM | SYSTOLIC BLOOD PRESSURE: 149 MMHG | BODY MASS INDEX: 18.73 KG/M2 | DIASTOLIC BLOOD PRESSURE: 70 MMHG | WEIGHT: 112.44 LBS

## 2024-07-31 DIAGNOSIS — L84 CORN OR CALLUS: ICD-10-CM

## 2024-07-31 DIAGNOSIS — E11.49 TYPE II DIABETES MELLITUS WITH NEUROLOGICAL MANIFESTATIONS: Primary | ICD-10-CM

## 2024-07-31 DIAGNOSIS — B35.1 ONYCHOMYCOSIS DUE TO DERMATOPHYTE: ICD-10-CM

## 2024-07-31 PROCEDURE — 11721 DEBRIDE NAIL 6 OR MORE: CPT | Mod: PBBFAC | Performed by: PODIATRIST

## 2024-07-31 PROCEDURE — 99214 OFFICE O/P EST MOD 30 MIN: CPT | Mod: PBBFAC,25 | Performed by: PODIATRIST

## 2024-07-31 PROCEDURE — 11721 DEBRIDE NAIL 6 OR MORE: CPT | Mod: Q9,59,S$PBB, | Performed by: PODIATRIST

## 2024-07-31 PROCEDURE — 99999 PR PBB SHADOW E&M-EST. PATIENT-LVL IV: CPT | Mod: PBBFAC,,, | Performed by: PODIATRIST

## 2024-07-31 PROCEDURE — 11056 PARNG/CUTG B9 HYPRKR LES 2-4: CPT | Mod: Q9,S$PBB,, | Performed by: PODIATRIST

## 2024-07-31 PROCEDURE — 99213 OFFICE O/P EST LOW 20 MIN: CPT | Mod: 25,S$PBB,, | Performed by: PODIATRIST

## 2024-07-31 PROCEDURE — 11056 PARNG/CUTG B9 HYPRKR LES 2-4: CPT | Mod: Q9,PBBFAC | Performed by: PODIATRIST

## 2024-07-31 RX ORDER — ASCORBIC ACID 500 MG/5ML
500 SYRUP ORAL
COMMUNITY
Start: 2024-04-13

## 2024-08-01 ENCOUNTER — TELEPHONE (OUTPATIENT)
Dept: PULMONOLOGY | Facility: CLINIC | Age: 89
End: 2024-08-01
Payer: MEDICARE

## 2024-08-01 ENCOUNTER — TELEPHONE (OUTPATIENT)
Dept: INTERNAL MEDICINE | Facility: CLINIC | Age: 89
End: 2024-08-01
Payer: MEDICARE

## 2024-08-01 DIAGNOSIS — J45.40 MODERATE PERSISTENT ASTHMA WITHOUT COMPLICATION: ICD-10-CM

## 2024-08-01 RX ORDER — FLUTICASONE FUROATE, UMECLIDINIUM BROMIDE AND VILANTEROL TRIFENATATE 200; 62.5; 25 UG/1; UG/1; UG/1
1 POWDER RESPIRATORY (INHALATION) DAILY
Start: 2024-08-01

## 2024-08-01 NOTE — TELEPHONE ENCOUNTER
----- Message from Nataly Carpio sent at 8/1/2024 11:12 AM CDT -----  Regarding: Medication  Contact: Jaskaran [phone]  Jaskaran/ [facility] is calling to get rx: fluticasone-umeclidin-vilanter (TRELEGY ELLIPTA) 200-62.5-25 mcg inhaler sent to pharmacy listed please call     [facility] [place] - [facility] AT Portal to Registered [facility]  [place] [address]  Phone: [phone] Fax: [phone]

## 2024-08-01 NOTE — TELEPHONE ENCOUNTER
----- Message from Davion Leija sent at 8/1/2024 12:04 PM CDT -----  Regarding: Refill Request  Contact: Pt [phone]  Requesting an RX refill or new RX.    Is this a refill or new RX:     RX name and strength (copy/paste from chart):  fluticasone-umeclidin-vilanter (TRELEGY ELLIPTA) 200-62.5-25 mcg inhaler    Is this a 30 day or 90 day RX:     Pharmacy name and phone # (copy/paste from chart):      [facility] [place] [facility] to [facility]istered [facility]  [place] [address]  Phone: [phone] Fax: [phone]

## 2024-08-01 NOTE — TELEPHONE ENCOUNTER
I spoke with patient in regards to scheduling her an follow up visit. Patient is scheduled on 11/19/24 at 3 PM with Dr Cary. Appointment mailed. Patient confirmed and verbalized understanding.

## 2024-08-04 DIAGNOSIS — J45.40 MODERATE PERSISTENT ASTHMA WITHOUT COMPLICATION: ICD-10-CM

## 2024-08-05 DIAGNOSIS — J45.40 MODERATE PERSISTENT ASTHMA WITHOUT COMPLICATION: ICD-10-CM

## 2024-08-05 RX ORDER — FLUTICASONE FUROATE, UMECLIDINIUM BROMIDE AND VILANTEROL TRIFENATATE 200; 62.5; 25 UG/1; UG/1; UG/1
1 POWDER RESPIRATORY (INHALATION) DAILY
Qty: 90 EACH | Refills: 3
Start: 2024-08-05

## 2024-08-05 RX ORDER — FLUTICASONE FUROATE, UMECLIDINIUM BROMIDE AND VILANTEROL TRIFENATATE 200; 62.5; 25 UG/1; UG/1; UG/1
1 POWDER RESPIRATORY (INHALATION) DAILY
Start: 2024-08-05

## 2024-08-06 NOTE — SUBJECTIVE & OBJECTIVE
Subjective:     Interval History: no acute events overntie, resp status improved but still requiring oxygen 3-5 liter and  IPV  Post-Op Info:  Procedure(s) (LRB):  COLECTOMY, RIGHT (Right)   7 Days Post-Op      Medications:  Continuous Infusions:  Scheduled Meds:   albuterol-ipratropium  3 mL Nebulization Q4H    atorvastatin  20 mg Oral QHS    enoxaparin  30 mg Subcutaneous Daily    fluticasone furoate-vilanteroL  1 puff Inhalation Daily    furosemide (LASIX) injection  20 mg Intravenous Once    guaiFENesin 100 mg/5 ml  200 mg Oral Q4H    insulin detemir U-100  2 Units Subcutaneous BID    latanoprost  1 drop Both Eyes QHS    metoprolol  5 mg Intravenous Q6H    metoprolol  5 mg Intravenous Once    montelukast  10 mg Oral QHS    sodium chloride 3%  4 mL Nebulization Q6H    tamsulosin  0.8 mg Oral Daily    tiotropium bromide  2 puff Inhalation Daily     PRN Meds:   dextrose 10%    dextrose 10%    glucagon (human recombinant)    HYDROmorphone    influenza 65up-adj    insulin aspart U-100    naloxone    ondansetron    prochlorperazine    sodium chloride 0.9%        Objective:     Vital Signs (Most Recent):  Temp: 98.3 °F (36.8 °C) (11/01/22 0742)  Pulse: 92 (11/01/22 1207)  Resp: 18 (11/01/22 1143)  BP: (!) 111/54 (11/01/22 0742)  SpO2: (!) 94 % (11/01/22 1143)   Vital Signs (24h Range):  Temp:  [97.2 °F (36.2 °C)-98.9 °F (37.2 °C)] 98.3 °F (36.8 °C)  Pulse:  [] 92  Resp:  [16-23] 18  SpO2:  [91 %-100 %] 94 %  BP: ()/(50-56) 111/54     Intake/Output - Last 3 Shifts         10/30 0700  10/31 0659 10/31 0700  11/01 0659 11/01 0700 11/02 0659    P.O. 150      IV Piggyback 1000      Total Intake(mL/kg) 1150 (24.4)      Urine (mL/kg/hr) 1250 (1.1) 1200 (1.1)     Stool 0      Total Output 1250 1200     Net -100 -1200            Stool Occurrence 1 x              Physical Exam  Vitals and nursing note reviewed.   Constitutional:       Appearance: She is well-developed.   HENT:      Head: Normocephalic.   Eyes:      Vision not as sharp on visual acuity (VA) at 1 week but noting no change per patient. Will have begin drop taper and advised OK to use lubricants as needed. Will see back for full post op once left eye (OS) addressed.     Pupils: Pupils are equal, round, and reactive to light.   Cardiovascular:      Rate and Rhythm: Normal rate and regular rhythm.      Heart sounds: Normal heart sounds.   Pulmonary:      Effort: Pulmonary effort is normal. No respiratory distress.      Breath sounds: Normal breath sounds. No wheezing or rales.   Abdominal:      Palpations: Abdomen is soft. There is no mass.      Tenderness: There is no guarding or rebound.      Comments: Abd inc line healing well  Having sotll eating well   Skin:     General: Skin is warm and dry.   Neurological:      Mental Status: She is alert and oriented to person, place, and time.   Psychiatric:         Behavior: Behavior normal.         Thought Content: Thought content normal.         Judgment: Judgment normal.           Significant Labs:  BMP (Last 3 Results):   Recent Labs   Lab 10/30/22  2314 10/31/22  0359 11/01/22  0330   * 256* 144*    135* 135*   K 3.7 3.7 3.6    101 99   CO2 31* 26 28   BUN 11 11 11   CREATININE 0.6 0.6 0.6   CALCIUM 8.0* 7.8* 7.7*   MG 1.6 1.4* 1.3*     CBC (Last 3 Results):   Recent Labs   Lab 10/30/22  2314 10/31/22  0359 11/01/22  0330   WBC 12.19 10.81 14.10*   RBC 3.97* 3.58* 3.31*   HGB 11.6* 10.5* 9.7*   HCT 36.0* 32.6* 30.5*    219 184   MCV 91 91 92   MCH 29.2 29.3 29.3   MCHC 32.2 32.2 31.8*       Significant Diagnostics:  None

## 2024-08-09 DIAGNOSIS — J44.1 COPD EXACERBATION: Primary | ICD-10-CM

## 2024-08-09 DIAGNOSIS — J45.40 MODERATE PERSISTENT ASTHMA WITHOUT COMPLICATION: ICD-10-CM

## 2024-08-09 RX ORDER — FLUTICASONE FUROATE, UMECLIDINIUM BROMIDE AND VILANTEROL TRIFENATATE 200; 62.5; 25 UG/1; UG/1; UG/1
POWDER RESPIRATORY (INHALATION)
Qty: 3 EACH | Refills: 3 | Status: SHIPPED | OUTPATIENT
Start: 2024-08-09

## 2024-08-19 ENCOUNTER — INFUSION (OUTPATIENT)
Dept: INFECTIOUS DISEASES | Facility: HOSPITAL | Age: 89
End: 2024-08-19
Payer: MEDICARE

## 2024-08-19 VITALS
BODY MASS INDEX: 18.69 KG/M2 | HEIGHT: 65 IN | HEART RATE: 80 BPM | DIASTOLIC BLOOD PRESSURE: 71 MMHG | TEMPERATURE: 99 F | OXYGEN SATURATION: 95 % | RESPIRATION RATE: 18 BRPM | WEIGHT: 112.19 LBS | SYSTOLIC BLOOD PRESSURE: 167 MMHG

## 2024-08-19 DIAGNOSIS — M81.0 SENILE OSTEOPOROSIS: Primary | ICD-10-CM

## 2024-08-19 PROCEDURE — 96372 THER/PROPH/DIAG INJ SC/IM: CPT

## 2024-08-19 PROCEDURE — 63600175 PHARM REV CODE 636 W HCPCS: Mod: JZ,JG | Performed by: INTERNAL MEDICINE

## 2024-08-19 RX ADMIN — DENOSUMAB 60 MG: 60 INJECTION SUBCUTANEOUS at 11:08

## 2024-08-19 NOTE — PROGRESS NOTES
Pt here for Prolia. Confirmed use of Vitamin D/Calcium supplement and denies dental procedures < 3 months. Administered per guidelines.     Next appointment scheduled.     Limited head-to-toe assessment due to privacy issues and visit reason though the opportunity was given for patient to express any concerns

## 2024-08-30 RX ORDER — LANCETS
1 EACH MISCELLANEOUS 4 TIMES DAILY
Qty: 400 EACH | Refills: 3 | Status: SHIPPED | OUTPATIENT
Start: 2024-08-30 | End: 2025-08-30

## 2024-09-12 RX ORDER — LEVOCETIRIZINE DIHYDROCHLORIDE 5 MG/1
2.5 TABLET, FILM COATED ORAL NIGHTLY
Qty: 45 TABLET | Refills: 3 | Status: SHIPPED | OUTPATIENT
Start: 2024-09-12 | End: 2025-09-12

## 2024-09-16 ENCOUNTER — LAB VISIT (OUTPATIENT)
Dept: LAB | Facility: HOSPITAL | Age: 89
End: 2024-09-16
Payer: MEDICARE

## 2024-09-16 DIAGNOSIS — E55.9 VITAMIN D INSUFFICIENCY: ICD-10-CM

## 2024-09-16 DIAGNOSIS — R80.9 TYPE 2 DIABETES MELLITUS WITH MICROALBUMINURIA, WITHOUT LONG-TERM CURRENT USE OF INSULIN: ICD-10-CM

## 2024-09-16 DIAGNOSIS — M81.0 SENILE OSTEOPOROSIS: ICD-10-CM

## 2024-09-16 DIAGNOSIS — E11.29 TYPE 2 DIABETES MELLITUS WITH MICROALBUMINURIA, WITHOUT LONG-TERM CURRENT USE OF INSULIN: ICD-10-CM

## 2024-09-16 LAB
25(OH)D3+25(OH)D2 SERPL-MCNC: 28 NG/ML (ref 30–96)
ALBUMIN SERPL BCP-MCNC: 3.4 G/DL (ref 3.5–5.2)
ANION GAP SERPL CALC-SCNC: 12 MMOL/L (ref 8–16)
BUN SERPL-MCNC: 14 MG/DL (ref 8–23)
CALCIUM SERPL-MCNC: 8.7 MG/DL (ref 8.7–10.5)
CHLORIDE SERPL-SCNC: 115 MMOL/L (ref 95–110)
CO2 SERPL-SCNC: 18 MMOL/L (ref 23–29)
CREAT SERPL-MCNC: 0.9 MG/DL (ref 0.5–1.4)
EST. GFR  (NO RACE VARIABLE): >60 ML/MIN/1.73 M^2
ESTIMATED AVG GLUCOSE: 117 MG/DL (ref 68–131)
GLUCOSE SERPL-MCNC: 77 MG/DL (ref 70–110)
HBA1C MFR BLD: 5.7 % (ref 4–5.6)
PHOSPHATE SERPL-MCNC: 2.1 MG/DL (ref 2.7–4.5)
POTASSIUM SERPL-SCNC: 3.6 MMOL/L (ref 3.5–5.1)
SODIUM SERPL-SCNC: 145 MMOL/L (ref 136–145)

## 2024-09-16 PROCEDURE — 82306 VITAMIN D 25 HYDROXY: CPT | Performed by: NURSE PRACTITIONER

## 2024-09-16 PROCEDURE — 36415 COLL VENOUS BLD VENIPUNCTURE: CPT | Performed by: NURSE PRACTITIONER

## 2024-09-16 PROCEDURE — 80069 RENAL FUNCTION PANEL: CPT | Performed by: NURSE PRACTITIONER

## 2024-09-16 PROCEDURE — 83036 HEMOGLOBIN GLYCOSYLATED A1C: CPT | Performed by: NURSE PRACTITIONER

## 2024-09-16 RX ORDER — ATORVASTATIN CALCIUM 20 MG/1
20 TABLET, FILM COATED ORAL NIGHTLY
Qty: 90 TABLET | Refills: 0 | Status: SHIPPED | OUTPATIENT
Start: 2024-09-16

## 2024-09-16 NOTE — TELEPHONE ENCOUNTER
Refill Routing Note   Medication(s) are not appropriate for processing by [facility] for the following reason(s):        Required labs outdated  No active prescription written by provider    [facility] action(s):  Defer               Appointments  past 12m or future 3m with PCP    Date Provider   Last Visit   6/19/2024 Leon Ramírez MD   Next Visit   12/20/2024 Leon Ramírez MD   ED visits in past 90 days: 0        Note composed:2:01 PM 09/16/2024

## 2024-09-16 NOTE — TELEPHONE ENCOUNTER
No care due was identified.  [facility] Embedded Care Due Messages. Reference number: [id number].   9/16/2024 2:01:48 PM CDT

## 2024-09-16 NOTE — PROGRESS NOTES
Subjective:      Patient ID: Sussy Costa is a 88 y.o. female.    Chief Complaint:  Diabetes    History of Present Illness  Sussy Costa is here for follow up of DM and Osteoporosis.  Previously seen by me 5/2024.      Hospital Admission 4/2024  She is s/p fall with left hip pain, plain films show left femoral intertrochanteric fracture. Ortho consulted. To OR 3/31 with Dr Walker with IM nail with WBAT.     With regards to Diabetes:     Latest Reference Range & Units 03/08/22 10:13   Glucose 70 - 110 mg/dL 207 (H)   C-Peptide 0.78 - 5.19 ng/mL 0.77 (L)   (H): Data is abnormally high  (L): Data is abnormally low  DE: 8/2021    Known complications:  DKA Denies   RN Denies  Eye Exam: 8/2024  PN Yes  Podiatry: August or July 2024  Nephropathy Denies  CAD Denies  Reports history of pancreatitis - on Creon.     Diet/Exercise:  Eats 3 meals a day.   Snacks : occasionally before bed  Drinks : water, soft drinks (diet or regular)   Exercise - tries to stay active   Recent illness, injury, steroids: hip fracture - surgery     Current regimen:  Jardiance 10 mg daily - started by Cardiology 6/2024  Lantus 6 units in AM and 5 units in the PM   Novolog 4-5-4 units before meals   Add correction scale if needed.   Blood sugar 180 to 230 add 1 units   Blood sugar 231 to 280 add 2 units   Blood sugar greater than 281 add 3 units     Reports compliance.     Other medications tried:  None.     Glucose Monitor:   4 times a day testing  Log reviewed: log provided and reviewed, scanned in media tab                    Hypoglycemia:  Yes - before lunch or dinner.  Knows how to correct with 15 grams of carbs- juice, coke, or a peppermint.       Diabetes Management Status    Hemoglobin A1C   Date Value Ref Range Status   09/16/2024 5.7 (H) 4.0 - 5.6 % Final     Comment:     ADA Screening Guidelines:  5.7-6.4%  Consistent with prediabetes  >or=6.5%  Consistent with diabetes    High levels of fetal hemoglobin interfere with the  HbA1C  assay. Heterozygous hemoglobin variants (HbS, HgC, etc)do  not significantly interfere with this assay.   However, presence of multiple variants may affect accuracy.     05/08/2024 5.9 (H) 4.0 - 5.6 % Final     Comment:     ADA Screening Guidelines:  5.7-6.4%  Consistent with prediabetes  >or=6.5%  Consistent with diabetes    High levels of fetal hemoglobin interfere with the HbA1C  assay. Heterozygous hemoglobin variants (HbS, HgC, etc)do  not significantly interfere with this assay.   However, presence of multiple variants may affect accuracy.     03/30/2024 7.4 (H) 4.0 - 5.6 % Final     Comment:     ADA Screening Guidelines:  5.7-6.4%  Consistent with prediabetes  >or=6.5%  Consistent with diabetes    High levels of fetal hemoglobin interfere with the HbA1C  assay. Heterozygous hemoglobin variants (HbS, HgC, etc)do  not significantly interfere with this assay.   However, presence of multiple variants may affect accuracy.         Statin: Taking  ACE/ARB: Taking  Screening or Prevention Patient's value Goal Complete/Controlled?   HgA1C Testing and Control   Lab Results   Component Value Date    HGBA1C 5.7 (H) 09/16/2024      Annually/Less than 8% Yes     Lipid profile : 09/19/2023 Annually Yes     LDL control Lab Results   Component Value Date    LDLCALC 62.8 (L) 09/19/2023    Annually/Less than 100 mg/dl  Yes     Nephropathy screening Lab Results   Component Value Date    LABMICR 31.0 04/22/2024     Lab Results   Component Value Date    PROTEINUA Trace (A) 04/29/2024    Annually Yes     Blood pressure BP Readings from Last 1 Encounters:   09/18/24 116/67    Less than 140/90 No     Dilated retinal exam : 10/16/2023 Annually No     Foot exam   Most Recent Foot Exam Date: Not Found Annually Yes       With regards to Osteoporosis:     First diagnosed with osteoporosis in 2007.    BMD:   11/7/2023  Impression:  *Osteoporosis on treatment with Denosumab.  *Fracture risk is very high due to calculated 10 year risk  of hip fracture >4.5% (FRAX)  *Compared with previous DXA, BMD at the lumbar spine has remained stable, and BMD at the total hip has declined by 8.2%.  RECOMMENDATIONS:  *Daily calcium intake 1200 mg, dietary sources preferred; Vitamin D 800-1000  *Weight bearing exercise and fall precautions.  *Given very high fracture risk, and significant decline in the hip BMD on denosumab would consider a 12 month treatment course of romosozumab followed by resumption of denosumab.  Depending on contraindications.  *Repeat BMD in 2 years  *If not recently completed, would recommend lateral thoracic and lumbar x-rays looking for prevalent fractures.    Medications:   Actonel 8/2018 - 12/2021  She was given first dose of Prolia in 2/2018 and developed joint pains and lower ext edema so it was decided that she would not receive another dose of Prolia. We decided against Reclast for the same reason and instead restarted her on an oral bisphosphonate which she has tolerated without side effects. After her last visit 12/2021 we decided to stop the oral bisphosphonate and try Prolia again. She received Prolia  in 1/2022 without any side effects.  Last Dose: 8/2024    Calcium intake: dietary sources   Vit D intake: OTC Vit D3 1000iu daily     Weight bearing exercise: walking   Falls: Denies  Fractures:   4/2024 - fractured hip   Significant height loss (>2 inches): ~ 1/2  inch     Family history: Denies    Menopause: 56y.o.  No HRT.    Tobacco Use: Denies  Alcohol Use: Denies  Glucocorticoid History: Prednisone over the years for asthma.   Anticoagulant Use: Denies  GERD/PPI Use: Denies  History of Malabsorption: Yes  Antiseizure Medications: Denies  History of Thyroid Disease: Denies  Kidney Stones/ Kidney Disease: Denies  Personal history of kidney stones: Denies  Family history of kidney stones: Denies  Family history of bone disease or fracture: Denies  Dental Visit: UTD       Lab Results   Component Value Date    CALCIUM 8.7  "09/16/2024    PHOS 2.1 (L) 09/16/2024     Vit D, 25-Hydroxy   Date Value Ref Range Status   09/16/2024 28 (L) 30 - 96 ng/mL Final     Comment:     Vitamin D deficiency.........<10 ng/mL                              Vitamin D insufficiency......10-29 ng/mL       Vitamin D sufficiency........> or equal to 30 ng/mL  Vitamin D toxicity............>100 ng/mL         Review of Systems  As above    Objective:   Physical Exam  Vitals reviewed.   Cardiovascular:      Rate and Rhythm: Normal rate.      Comments: No edema present  Pulmonary:      Effort: Pulmonary effort is normal.   Abdominal:      Palpations: Abdomen is soft.       Injection sites are without edema or erythema. No lipo hypertropthy or atrophy.    Visit Vitals  /67   Pulse 66   Ht 5' 5" (1.651 m)   Wt 52.2 kg (115 lb 1.3 oz)   SpO2 97%   BMI 19.15 kg/m²                 Body mass index is 19.15 kg/m².    Lab Review:   Lab Results   Component Value Date    HGBA1C 5.7 (H) 09/16/2024    HGBA1C 5.9 (H) 05/08/2024    HGBA1C 7.4 (H) 03/30/2024       Lab Results   Component Value Date    CHOL 124 09/19/2023    HDL 53 09/19/2023    LDLCALC 62.8 (L) 09/19/2023    TRIG 41 09/19/2023    CHOLHDL 42.7 09/19/2023     Lab Results   Component Value Date     09/16/2024    K 3.6 09/16/2024     (H) 09/16/2024    CO2 18 (L) 09/16/2024    GLU 77 09/16/2024    BUN 14 09/16/2024    CREATININE 0.9 09/16/2024    CALCIUM 8.7 09/16/2024    PROT 6.4 06/19/2024    ALBUMIN 3.4 (L) 09/16/2024    BILITOT 0.3 06/19/2024    ALKPHOS 76 06/19/2024    AST 18 06/19/2024    ALT 16 06/19/2024    ANIONGAP 12 09/16/2024    ESTGFRAFRICA >60.0 07/11/2022    EGFRNONAA >60.0 07/11/2022    TSH 3.743 09/19/2023     Vit D, 25-Hydroxy   Date Value Ref Range Status   09/16/2024 28 (L) 30 - 96 ng/mL Final     Comment:     Vitamin D deficiency.........<10 ng/mL                              Vitamin D insufficiency......10-29 ng/mL       Vitamin D sufficiency........> or equal to 30 " ng/mL  Vitamin D toxicity............>100 ng/mL       Assessment and Plan     1. Type 2 diabetes mellitus with microalbuminuria, without long-term current use of insulin  Hemoglobin A1C    Comprehensive Metabolic Panel      2. Senile osteoporosis  Vitamin D      3. Vitamin D insufficiency            Type 2 diabetes mellitus with microalbuminuria, without long-term current use of insulin  -- Labs prior to follow up.  -- History of chronic pancreatitis. Low C peptide - manage as T1DM.  -- Brittle diabetic.  -- A1c goal < or = 8%.  -- Medications discussed:  SGLT2i  Reviewed potential adverse effects of SGLT-2 inhibitors, including genital mycotic infections, slightly increased risk of UTI, hypersensitivity, hypotension, and hyperkalemia. Advised to maintain water intake of 8-10 cups per day. Advised we need to check chemistry panel at baseline and 2 weeks after starting. Discussed FDA warning reports of ketoacidosis associated with SGLT-2 inhibitors. Advised to seek immediate medical attention and stop the medication if symptoms such as difficulty breathing, nausea, vomiting, abdominal pain, confusion, and unusual fatigue/sleepiness. Discussed possible precipitating factors including major illness/reduced food and fluid intake (advised to stop under these circumstances), and reduced insulin dose. Discussed possible effects of increased fracture risk/decreased bone density. Discussed reports of increased risk of leg and foot amputations with canagliflozin and need to seek urgent care if developed new pain or tenderness, sores or ulcers, or infections in legs/feet.   Insulin   -- Reviewed logs/CGM:  Instructed to send glucose logs in 7-14 days.  Reach out to me sooner for any glucose <80 or consistently >200.  Not interested in personal CGM.  -- Medication Changes:   Jardiance 10 mg daily  Lantus 5 units in AM and 5 units in the PM   Novolog 3-5-4 units before meals   Add correction scale if needed.   Blood sugar 180 to  230 add 1 units   Blood sugar 231 to 280 add 2 units   Blood sugar greater than 281 add 3 units     Reaching out to cardiology about concern for increased risk of DKA with addition of SGLT2i in this patient.     -- Reviewed goals of therapy are to get the best control we can without hypoglycemia.  -- Reviewed patient's current insulin regimen. Clarified proper insulin dose and timing in relation to meals, etc. Insulin injection sites and proper rotation instructed.   -- Advised frequent self blood glucose monitoring.  Patient encouraged to document glucose results and bring them to every clinic visit.  -- Hypoglycemia precautions discussed. Instructed on precautions before driving.    -- Call for Bg repeatedly < 90 or > 180.   -- Close adherence to lifestyle changes recommended.   -- Periodic follow ups for eye evaluations, foot care and dental care suggested.    Senile osteoporosis  -- Risks include low weight,  race, menopause, prior chronic glucocorticoid use.  -- Reviewed basics of quantity versus quality.  -- Reassuring they are not fracturing.  -- Recommend:  -Pharmacological therapy is recommended for patients with osteopenia if the 10 year probability of a hip fracture is >3% and 10 year probability of other major osteoporotic fractures is >20%.  Treatment options and potential side effects discussed for PO bisphosphonates, reclast, Denosumab, and Teriparatide.   -Treatment:   Actonel 8/2018 - 12/2021  She was given first dose of Prolia in 2/2018 and developed joint pains and lower ext edema so it was decided that she would not receive another dose of Prolia. We decided against Reclast for the same reason and instead restarted her on an oral bisphosphonate which she has tolerated without side effects. After her last visit 12/2021 we decided to stop the oral bisphosphonate and try Prolia again. She received Prolia  in 1/2022 without any side effects.  Last Dose: 8/2024  Continue Prolia every 6  months.  -Calcium and Vitamin D RDD provided.  -Exercise: recommended..  -Fall precautions made in the home.  -Alerted that if dental work needs to be done it should be done prior to initiating therapy. Dental health: UTD.  -- Repeat DEXA scan 11/2024. Reviewed BMD from 2023 noting decline - discussed switching to anabolic vs continuing (1/2024 - she declined). Now with hip fracture - repeat labs and will discuss again.    Vitamin D insufficiency  -- CHANGE OTC Vit D3 2000iu daily.          Follow up in about 4 months (around 1/18/2025).      Visit today included increased complexity associated with the care of the problems addressed and managing the longitudinal care of the patient due to the serious and/or complex managed problems.

## 2024-09-18 ENCOUNTER — OFFICE VISIT (OUTPATIENT)
Dept: ENDOCRINOLOGY | Facility: CLINIC | Age: 89
End: 2024-09-18
Payer: MEDICARE

## 2024-09-18 ENCOUNTER — TELEPHONE (OUTPATIENT)
Dept: INTERNAL MEDICINE | Facility: CLINIC | Age: 89
End: 2024-09-18
Payer: MEDICARE

## 2024-09-18 VITALS
BODY MASS INDEX: 19.17 KG/M2 | HEART RATE: 66 BPM | DIASTOLIC BLOOD PRESSURE: 67 MMHG | SYSTOLIC BLOOD PRESSURE: 116 MMHG | HEIGHT: 65 IN | OXYGEN SATURATION: 97 % | WEIGHT: 115.06 LBS

## 2024-09-18 DIAGNOSIS — M81.0 SENILE OSTEOPOROSIS: ICD-10-CM

## 2024-09-18 DIAGNOSIS — E55.9 VITAMIN D INSUFFICIENCY: ICD-10-CM

## 2024-09-18 DIAGNOSIS — E11.29 TYPE 2 DIABETES MELLITUS WITH MICROALBUMINURIA, WITHOUT LONG-TERM CURRENT USE OF INSULIN: Primary | Chronic | ICD-10-CM

## 2024-09-18 DIAGNOSIS — R80.9 TYPE 2 DIABETES MELLITUS WITH MICROALBUMINURIA, WITHOUT LONG-TERM CURRENT USE OF INSULIN: Primary | Chronic | ICD-10-CM

## 2024-09-18 PROCEDURE — G2211 COMPLEX E/M VISIT ADD ON: HCPCS | Mod: S$PBB,,, | Performed by: NURSE PRACTITIONER

## 2024-09-18 PROCEDURE — 99999 PR PBB SHADOW E&M-EST. PATIENT-LVL IV: CPT | Mod: PBBFAC,,, | Performed by: NURSE PRACTITIONER

## 2024-09-18 PROCEDURE — 99214 OFFICE O/P EST MOD 30 MIN: CPT | Mod: PBBFAC | Performed by: NURSE PRACTITIONER

## 2024-09-18 PROCEDURE — 99214 OFFICE O/P EST MOD 30 MIN: CPT | Mod: S$PBB,,, | Performed by: NURSE PRACTITIONER

## 2024-09-18 RX ORDER — PEN NEEDLE, DIABETIC 31 GX5/16"
NEEDLE, DISPOSABLE MISCELLANEOUS
COMMUNITY
Start: 2024-08-12

## 2024-09-18 RX ORDER — BLOOD SUGAR DIAGNOSTIC
STRIP MISCELLANEOUS
COMMUNITY
Start: 2024-08-15

## 2024-09-18 NOTE — PATIENT INSTRUCTIONS
Instructed to send glucose logs in 14 days.  Reach out to me sooner for any glucose <80 or consistently >200.    Recommended Daily Dose:  Vitamin D3: 2000 iu daily         Jardiance 10 mg daily  Lantus 5 units in AM and 5 units in the PM   Novolog 3-5-4 units before meals   Add correction scale if needed.   Blood sugar 180 to 230 add 1 units   Blood sugar 231 to 280 add 2 units   Blood sugar greater than 281 add 3 units

## 2024-09-18 NOTE — Clinical Note
Hi,   I just met with a mutual patient for Diabetes follow up. She informed me she was started on Jardiance ~ June and tolerating it well. I expressed my concern about her increased risk of DKA with a SGLT2i as a Type 1 Diabetic. I educated her on hydration and when to skip the medication (illness). Just wanted to let you all know I instructed her on skipping when ill.  Please let me know if you have any questions.    Heike Ye

## 2024-09-18 NOTE — ASSESSMENT & PLAN NOTE
-- Labs prior to follow up.  -- History of chronic pancreatitis. Low C peptide - manage as T1DM.  -- Brittle diabetic.  -- A1c goal < or = 8%.  -- Medications discussed:  SGLT2i  Reviewed potential adverse effects of SGLT-2 inhibitors, including genital mycotic infections, slightly increased risk of UTI, hypersensitivity, hypotension, and hyperkalemia. Advised to maintain water intake of 8-10 cups per day. Advised we need to check chemistry panel at baseline and 2 weeks after starting. Discussed FDA warning reports of ketoacidosis associated with SGLT-2 inhibitors. Advised to seek immediate medical attention and stop the medication if symptoms such as difficulty breathing, nausea, vomiting, abdominal pain, confusion, and unusual fatigue/sleepiness. Discussed possible precipitating factors including major illness/reduced food and fluid intake (advised to stop under these circumstances), and reduced insulin dose. Discussed possible effects of increased fracture risk/decreased bone density. Discussed reports of increased risk of leg and foot amputations with canagliflozin and need to seek urgent care if developed new pain or tenderness, sores or ulcers, or infections in legs/feet.   Insulin   -- Reviewed logs/CGM:  Instructed to send glucose logs in 7-14 days.  Reach out to me sooner for any glucose <80 or consistently >200.  Not interested in personal CGM.  -- Medication Changes:   Jardiance 10 mg daily  Lantus 5 units in AM and 5 units in the PM   Novolog 3-5-4 units before meals   Add correction scale if needed.   Blood sugar 180 to 230 add 1 units   Blood sugar 231 to 280 add 2 units   Blood sugar greater than 281 add 3 units     Reaching out to cardiology about concern for increased risk of DKA with addition of SGLT2i in this patient.     -- Reviewed goals of therapy are to get the best control we can without hypoglycemia.  -- Reviewed patient's current insulin regimen. Clarified proper insulin dose and timing in  relation to meals, etc. Insulin injection sites and proper rotation instructed.   -- Advised frequent self blood glucose monitoring.  Patient encouraged to document glucose results and bring them to every clinic visit.  -- Hypoglycemia precautions discussed. Instructed on precautions before driving.    -- Call for Bg repeatedly < 90 or > 180.   -- Close adherence to lifestyle changes recommended.   -- Periodic follow ups for eye evaluations, foot care and dental care suggested.

## 2024-09-18 NOTE — ASSESSMENT & PLAN NOTE
-- Risks include low weight,  race, menopause, prior chronic glucocorticoid use.  -- Reviewed basics of quantity versus quality.  -- Reassuring they are not fracturing.  -- Recommend:  -Pharmacological therapy is recommended for patients with osteopenia if the 10 year probability of a hip fracture is >3% and 10 year probability of other major osteoporotic fractures is >20%.  Treatment options and potential side effects discussed for PO bisphosphonates, reclast, Denosumab, and Teriparatide.   -Treatment:   Actonel 8/2018 - 12/2021  She was given first dose of Prolia in 2/2018 and developed joint pains and lower ext edema so it was decided that she would not receive another dose of Prolia. We decided against Reclast for the same reason and instead restarted her on an oral bisphosphonate which she has tolerated without side effects. After her last visit 12/2021 we decided to stop the oral bisphosphonate and try Prolia again. She received Prolia  in 1/2022 without any side effects.  Last Dose: 8/2024  Continue Prolia every 6 months.  -Calcium and Vitamin D RDD provided.  -Exercise: recommended..  -Fall precautions made in the home.  -Alerted that if dental work needs to be done it should be done prior to initiating therapy. Dental health: UTD.  -- Repeat DEXA scan 11/2024. Reviewed BMD from 2023 noting decline - discussed switching to anabolic vs continuing (1/2024 - she declined). Now with hip fracture - repeat labs and will discuss again.

## 2024-09-24 ENCOUNTER — OFFICE VISIT (OUTPATIENT)
Dept: INTERNAL MEDICINE | Facility: CLINIC | Age: 89
End: 2024-09-24
Payer: MEDICARE

## 2024-09-24 VITALS
OXYGEN SATURATION: 100 % | RESPIRATION RATE: 12 BRPM | HEART RATE: 75 BPM | WEIGHT: 111.31 LBS | HEIGHT: 65 IN | SYSTOLIC BLOOD PRESSURE: 128 MMHG | DIASTOLIC BLOOD PRESSURE: 70 MMHG | BODY MASS INDEX: 18.55 KG/M2 | TEMPERATURE: 98 F

## 2024-09-24 DIAGNOSIS — E11.29 TYPE 2 DIABETES MELLITUS WITH MICROALBUMINURIA, WITHOUT LONG-TERM CURRENT USE OF INSULIN: Chronic | ICD-10-CM

## 2024-09-24 DIAGNOSIS — E78.5 HYPERLIPIDEMIA ASSOCIATED WITH TYPE 2 DIABETES MELLITUS: Chronic | ICD-10-CM

## 2024-09-24 DIAGNOSIS — I48.0 PAROXYSMAL ATRIAL FIBRILLATION: Chronic | ICD-10-CM

## 2024-09-24 DIAGNOSIS — I87.2 STASIS DERMATITIS: Primary | ICD-10-CM

## 2024-09-24 DIAGNOSIS — E11.69 HYPERLIPIDEMIA ASSOCIATED WITH TYPE 2 DIABETES MELLITUS: Chronic | ICD-10-CM

## 2024-09-24 DIAGNOSIS — D49.2 ABNORMAL SKIN GROWTH: ICD-10-CM

## 2024-09-24 DIAGNOSIS — I10 PRIMARY HYPERTENSION: Chronic | ICD-10-CM

## 2024-09-24 DIAGNOSIS — R80.9 TYPE 2 DIABETES MELLITUS WITH MICROALBUMINURIA, WITHOUT LONG-TERM CURRENT USE OF INSULIN: Chronic | ICD-10-CM

## 2024-09-24 PROCEDURE — 99214 OFFICE O/P EST MOD 30 MIN: CPT | Mod: S$PBB,,,

## 2024-09-24 PROCEDURE — 99214 OFFICE O/P EST MOD 30 MIN: CPT | Mod: PBBFAC,PO

## 2024-09-24 PROCEDURE — 99999 PR PBB SHADOW E&M-EST. PATIENT-LVL IV: CPT | Mod: PBBFAC,,,

## 2024-09-24 RX ORDER — TRIAMCINOLONE ACETONIDE 1 MG/G
CREAM TOPICAL 2 TIMES DAILY
Qty: 80 G | Refills: 0 | Status: SHIPPED | OUTPATIENT
Start: 2024-09-24

## 2024-09-24 NOTE — PROGRESS NOTES
Subjective:       Patient ID: Sussy oCsta is a 88 y.o. female.    Chief Complaint: Rash      History of Present Illness    CHIEF COMPLAINT:  Chief Complaint    SKIN CONCERNS:  She presents with persistent skin issues following cellulitis 6 weeks ago. She reports rough skin on her leg with occasional itching and burning sensations, more noticeable at night. Symptoms persisted despite completing two courses of antibiotics and applying prescribed cream. She also mentions a growth on her left upper arm present for about two years, which has worsened in the past month with something growing out of the middle and associated itching. Denies systemic symptoms.     MEDICAL HISTORY:  She has a history of cellulitis, atrial fibrillation, diabetes mellitus, hypertension, hyperlipidemia, and a past hip fracture. She was previously on anticoagulation for one month post-surgery for atrial fibrillation but is currently not taking any blood thinners.    MEDICATIONS:  Current medications include Losartan 100 mg for hypertension, Jardiance for diabetes, Atorvastatin 20 mg for hyperlipidemia, Lasix as needed for swelling with potassium supplement, insulin therapy (Lantus morning and evening, NovoLog morning, noon, and evening) also for diabetes.     ALLERGIES:  She reports allergies to aspirin, NSAIDs, penicillin, and penicillin G.      LIFESTYLE AND SOCIAL HISTORY:  She reports being sedentary during the day with difficulty elevating her legs due to daily routine and responsibilities. She attempted to use compression socks as recommended by Dr. Ramírez but was unable to pull them up due to leg swelling. She plans to try compression socks with zippers.    ROS:  Integumentary: +itching, +growth      Review of Systems   Constitutional:  Negative for chills, diaphoresis, fatigue, fever and night sweats.   Integumentary:  Positive for rash.         Objective:      Physical Exam  Vitals reviewed.   Constitutional:       General: She is  awake. She is not in acute distress.     Appearance: Normal appearance. She is well-developed and well-groomed. She is not ill-appearing, toxic-appearing or diaphoretic.   HENT:      Head: Normocephalic and atraumatic.      Right Ear: External ear normal.      Left Ear: External ear normal.      Nose: Nose normal.      Mouth/Throat:      Mouth: Mucous membranes are moist.      Pharynx: Oropharynx is clear. No oropharyngeal exudate.   Eyes:      General: No scleral icterus.     Conjunctiva/sclera: Conjunctivae normal.      Pupils: Pupils are equal, round, and reactive to light.   Cardiovascular:      Rate and Rhythm: Normal rate and regular rhythm.      Pulses: Normal pulses.           Radial pulses are 2+ on the right side and 2+ on the left side.        Dorsalis pedis pulses are 2+ on the right side and 2+ on the left side.      Heart sounds: Normal heart sounds. No murmur heard.     No friction rub. No gallop.   Pulmonary:      Effort: Pulmonary effort is normal. No respiratory distress.      Breath sounds: Normal breath sounds. No wheezing, rhonchi or rales.   Musculoskeletal:         General: Normal range of motion.      Right lower le+ Pitting Edema present.      Left lower le+ Pitting Edema present.   Skin:     General: Skin is warm and dry.      Capillary Refill: Capillary refill takes less than 2 seconds.      Findings: Erythema and rash present. Rash is scaling and urticarial.      Comments: Mild erythema with scaly plaques to left/leg ankle. No warmth or drainage. Skin intact. Mild swelling present. See media.    Neurological:      Mental Status: She is alert and oriented to person, place, and time. Mental status is at baseline.   Psychiatric:         Behavior: Behavior normal. Behavior is cooperative.         Assessment:       1. Stasis dermatitis  - triamcinolone acetonide 0.1% (KENALOG) 0.1 % cream; Apply topically 2 (two) times daily.  Dispense: 80 g; Refill: 0    2. Primary hypertension    3.  Hyperlipidemia associated with type 2 diabetes mellitus    4. Paroxysmal atrial fibrillation    5. Type 2 diabetes mellitus with microalbuminuria, without long-term current use of insulin    6. Abnormal skin growth  - Ambulatory referral/consult to Dermatology; Future      Plan:     Assessment & Plan    LEG SWELLING:  - Emphasized the importance of elevating legs to heart level when sitting to improve venous return.  - Discussed the benefits of wearing compression socks for managing leg swelling.  - Advised on the importance of a low-sodium diet in managing swelling.  - Ms. Costa to elevate legs when sitting, preferably to heart level.  - Ms. Costa to wear compression socks.  - Ms. Costa to follow a low-sodium diet.    DERMATOLOGICAL ISSUE:  - Ms. Costa to avoid scratching affected areas on legs.  - Started triamcinolone cream, apply 2 times daily to left lower leg until symptoms resolve.    DERMATOLOGY REFERRAL:  - Referred to dermatology for evaluation of growth on left upper arm.    MEDICATIONS/SUPPLEMENTS:  - Continued Losartan 100mg for blood pressure, Jardiance for diabetes, Lantus insulin morning and evening, NovoLog insulin morning, noon, and evening, Lipitor 20mg for hyperlipidemia, and Lasix as needed for swelling, with potassium.    FOLLOW UP:  - Follow up as scheduled with Dr. Ramírez.  - Contact the office if symptoms do not improve with prescribed treatment.          Return to clinic with new or worsening symptoms.     Leon Maldonado,MSN, APRN, FNP-C  [facility], Internal Medicine  1:27 PM      This note was generated with the assistance of ambient listening technology. Verbal consent was obtained by the patient and accompanying visitor(s) for the recording of patient appointment to facilitate this note. I attest to having reviewed and edited the generated note for accuracy, though some syntax or spelling errors may persist. Please contact the author of this note for any  clarification.

## 2024-09-30 ENCOUNTER — PATIENT MESSAGE (OUTPATIENT)
Dept: DERMATOLOGY | Facility: CLINIC | Age: 89
End: 2024-09-30
Payer: MEDICARE

## 2024-10-04 ENCOUNTER — DOCUMENTATION ONLY (OUTPATIENT)
Dept: HEMATOLOGY/ONCOLOGY | Facility: CLINIC | Age: 89
End: 2024-10-04
Payer: MEDICARE

## 2024-10-04 ENCOUNTER — HOSPITAL ENCOUNTER (OUTPATIENT)
Dept: RADIOLOGY | Facility: HOSPITAL | Age: 89
Discharge: HOME | End: 2024-10-04
Attending: HOSPITALIST
Payer: MEDICARE

## 2024-10-04 DIAGNOSIS — Z85.038 HISTORY OF COLON CANCER: ICD-10-CM

## 2024-10-04 PROCEDURE — 74177 CT ABD & PELVIS W/CONTRAST: CPT | Mod: 26,,, | Performed by: RADIOLOGY

## 2024-10-04 PROCEDURE — 71260 CT THORAX DX C+: CPT | Mod: TC

## 2024-10-04 PROCEDURE — 25500020 PHARM REV CODE 255: Performed by: HOSPITALIST

## 2024-10-04 PROCEDURE — 71260 CT THORAX DX C+: CPT | Mod: 26,,, | Performed by: RADIOLOGY

## 2024-10-04 PROCEDURE — 74177 CT ABD & PELVIS W/CONTRAST: CPT | Mod: TC

## 2024-10-04 PROCEDURE — A9698 NON-RAD CONTRAST MATERIALNOC: HCPCS | Performed by: HOSPITALIST

## 2024-10-04 RX ADMIN — BARIUM SULFATE 450 ML: 20 SUSPENSION ORAL at 12:10

## 2024-10-04 RX ADMIN — IOHEXOL 75 ML: 350 INJECTION, SOLUTION INTRAVENOUS at 12:10

## 2024-10-06 ENCOUNTER — PATIENT MESSAGE (OUTPATIENT)
Dept: ENDOCRINOLOGY | Facility: CLINIC | Age: 89
End: 2024-10-06
Payer: MEDICARE

## 2024-10-09 ENCOUNTER — PATIENT MESSAGE (OUTPATIENT)
Dept: HEMATOLOGY/ONCOLOGY | Facility: CLINIC | Age: 89
End: 2024-10-09

## 2024-10-09 ENCOUNTER — OFFICE VISIT (OUTPATIENT)
Dept: HEMATOLOGY/ONCOLOGY | Facility: CLINIC | Age: 89
End: 2024-10-09
Payer: MEDICARE

## 2024-10-09 VITALS
BODY MASS INDEX: 19.47 KG/M2 | DIASTOLIC BLOOD PRESSURE: 86 MMHG | RESPIRATION RATE: 20 BRPM | SYSTOLIC BLOOD PRESSURE: 187 MMHG | WEIGHT: 116.88 LBS | OXYGEN SATURATION: 98 % | HEIGHT: 65 IN | TEMPERATURE: 99 F | HEART RATE: 66 BPM

## 2024-10-09 DIAGNOSIS — Z85.038 HISTORY OF COLON CANCER: Primary | ICD-10-CM

## 2024-10-09 DIAGNOSIS — K86.89 PANCREATIC INSUFFICIENCY: ICD-10-CM

## 2024-10-09 DIAGNOSIS — K86.2 PANCREATIC CYST: ICD-10-CM

## 2024-10-09 DIAGNOSIS — C7A.090 CARCINOID BRONCHIAL ADENOMA, RIGHT: ICD-10-CM

## 2024-10-09 DIAGNOSIS — M81.0 SENILE OSTEOPOROSIS: ICD-10-CM

## 2024-10-09 DIAGNOSIS — I10 PRIMARY HYPERTENSION: Chronic | ICD-10-CM

## 2024-10-09 PROCEDURE — 99999 PR PBB SHADOW E&M-EST. PATIENT-LVL V: CPT | Mod: PBBFAC,,, | Performed by: HOSPITALIST

## 2024-10-09 PROCEDURE — 99215 OFFICE O/P EST HI 40 MIN: CPT | Mod: PBBFAC | Performed by: HOSPITALIST

## 2024-10-09 NOTE — ASSESSMENT & PLAN NOTE
- Slightly progressed  - No concerning symptoms - discussed MRCP now vs interval CT in 6 months  - Will go ahead with CT in 6 months

## 2024-10-09 NOTE — PROGRESS NOTES
MEDICAL ONCOLOGY FOLLOW-UP VISIT.     Best Contact Phone Number(s): [phone] (home)      Cancer/Stage/TNM:    Cancer Staging   History of colon cancer  Staging form: Colon and Rectum, AJCC 8th Edition  - Clinical: Stage IIB (cT4a, cN0, cM0) - Signed by Nakul English MD on 2/3/2023       Reason for visit:  Stage II CRC on surveillance    HPI: Sussy Costa is a 88 y.o. female with asthma, recurrent PNA and hypoxic respiratory failure, and DM2 who was hospitalized 10/2022 for large bowel obstruction. 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  She deferred adjuvant chemotherapy for her high risk stage II CRC.  She presents to medical oncology clinic for routine follow up.    Interval History:           Appetite is good. Weight is up. Normal bowel movements. No blood in stool. No N/V. No CP or cough. Intermittent mild OLIVERA. Reports normal bowel movemnts. Has some occasional tingling within her feet. Recently started         Oncology History   History of colon cancer   10/25/2022 Surgery    Right hemicolectomy    Moderately differentiated MMR proficient adenocarcinoma invading into the visceral peritoneum with associated PNI, LVI, and high tumor budding. Negative margins. 0/18 LN. [id number]     10/25/2022 Imaging Significant Findings    CT a/p with contrast:     1. Circumferential wall thickening of the short segment of ascending colon and apple-core appearance with pericolic fat stranding and free fluid, resulting in significant mass effect and dilatation of the cecum up to measuring 10 cm.  Suspect obstructing ascending colon neoplasm.    2. Diffuse gastric wall thickening and enhancement suggesting gastritis.  3. Stable innumerable pancreatic hypodense lesion.  4. Pulmonary nodules similar in size and number in comparison  prior.  Metastasis not excluded.  5. Multiple hypodense lesions in the liver and a few in the spleen as well, similar to prior.  Metastasis not excluded.       12/7/2022 Imaging Significant Findings    CT chest:    1. Following resolution of left inter lobar and lower lobe bronchi mucous plugging, there is resolution of previous left lower lobe atelectasis.  However, there are many bilateral subcentimeter pulmonary nodules in both lower lobes which appear increased since a CT of the abdomen and pelvis 10/25/2022 in which there is partial imaging of the chest base.  There is a new 1.4 cm right lower lobe nodule.  2. Interval development of small volume right-sided pleural effusion.  This report was flagged in Epic as abnormal.     12/19/2022 Imaging Significant Findings    MR abdomen without evidence of intrabdominal malignancy. Ongoing chronic pancreatitis     1/24/2023 Biopsy    Biopsy of right lower lube nodule : Typical carcinoid. No mitoses noted.     2/15/2023 Notable Event      Seen by rad onc (Dr. Zamorano) 02/15/23 regarding the carcinoid tumor in the lung- imaging felt consistent with an indolent carcinoid tumor over the last decade with more recent inflammatory nodule. Plan for ongoing surveillance.     5/1/2023 Imaging Significant Findings    CT torso: Improvement in the 1.4cm RLL nodule. This nodule was not biopsied, and was though possible more inflammatory in nature. Other subcentimeter nodules remain stable. No evidence of disease progression. No other evidence of recurrent or persistent colon cancer.     6/26/2023 Procedure    Colonoscopy  Impression:      - Patent side-to-side ileo-colonic anastomosis,                          characterized by healthy appearing mucosa.                          - Diverticulosis in the sigmoid colon.                          - Internal hemorrhoids.                          - No specimens collected.       Imaging Significant Findings    CT CAP  Impression:  1.  Postoperative change prior right hemicolectomy without CT evidence of residual/recurrent disease.  2. Numerous scattered ground-glass and solid pulmonary nodules, not significantly changed.  No appreciable new or enlarging pulmonary nodules.  3. Cystic lesions within the pancreas with persistent dilatation of the main pancreatic duct, not significantly changed.     4/2024 Imaging Significant Findings    CT Chest 5/6/24  1. Redemonstration numerous bilateral pulmonary nodules as above which appear grossly stable.  2. Interval increase in size of right pleural effusion and small left pleural effusion.  3. 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  4. Linear opacities in the left lower lobe extending from the hilum to the pleura and in the right middle lobe, likely subsegmental atelectasis versus scarring.  5. Additional findings as above.    CT a/p 4/29/24  1. Large amount of stool throughout the colon suggests constipation, correlation is advised.  2. Findings suggest hepatic steatosis, correlation with LFTs recommended.  3. Stable configuration of the pancreas noting likely sequela of chronic pancreatitis and ductal dilation.  No interval inflammation about the pancreas.  Correlation with pancreatic enzymes as warranted.  4. The urinary bladder is distended, correlation with any history of outlet obstruction or urinary retention.  5. Stable pulmonary nodules, please see exam 11/18/2023.     10/4/2024 Imaging Significant Findings    CT Torso 10/4/24  Impression:  - Patient with pulmonary carcinoid tumor and colon adenocarcinoma status post right hemicolectomy.  - No evidence of new metastatic disease in the chest, abdomen, or pelvis.  - Multiple bilateral solid and ground-glass pulmonary nodules, overall unchanged in size and distribution since 2022.  Differential considerations discussed above.  No new or enlarging pulmonary nodules.  Continued attention on  "follow-up is advised.  - Multiple large cystic lesions in the pancreas, 1 of which has enlarged from 2022.  Further evaluation could be obtained with MRI/MRCP if clinically warranted.            Physical Exam:   BP (!) 187/86 Comment: left arm  Pulse 66   Temp 98.8 °F (37.1 °C) (Temporal)   Resp 20   Ht 5' 5" (1.651 m)   Wt 53 kg (116 lb 13.5 oz)   SpO2 98%   BMI 19.44 kg/m²      ECOG Performance Status: (foot note - ECOG PS provided by [employer]) 1 - Symptomatic but completely ambulatory    Physical Exam  Constitutional:       General: She is not in acute distress.     Appearance: She is normal weight.   HENT:      Head: Normocephalic.      Mouth/Throat:      Mouth: Mucous membranes are moist.      Pharynx: Oropharynx is clear.   Eyes:      General: No scleral icterus.     Conjunctiva/sclera: Conjunctivae normal.      Pupils: Pupils are equal, round, and reactive to light.   Cardiovascular:      Rate and Rhythm: Normal rate and regular rhythm.      Heart sounds: No murmur heard.  Pulmonary:      Effort: Pulmonary effort is normal. No respiratory distress.      Breath sounds: Normal breath sounds.   Abdominal:      General: There is no distension.      Palpations: Abdomen is soft.   Musculoskeletal:         General: Normal range of motion.      Cervical back: Normal range of motion and neck supple.      Right lower leg: No edema.      Left lower leg: No edema.   Lymphadenopathy:      Cervical: No cervical adenopathy.   Skin:     General: Skin is warm.      Coloration: Skin is not jaundiced.   Neurological:      General: No focal deficit present.      Mental Status: She is alert and oriented to person, place, and time.      Motor: No weakness.   Psychiatric:         Mood and Affect: Mood normal.         Behavior: Behavior normal.         Thought Content: Thought content normal.           Labs:   Lab Results   Component Value Date     10/04/2024    K 3.8 10/04/2024    CREATININE " 0.9 10/04/2024    WBC 8.86 10/04/2024    HGB 10.2 (L) 10/04/2024     10/04/2024    AST 26 10/04/2024    ALT 24 10/04/2024    ALKPHOS 61 10/04/2024    BILITOT 0.4 10/04/2024          Imaging:     CT Chest Abdomen Pelvis With IV Contrast (XPD) Routine Oral Contrast  Narrative: EXAMINATION:  CT CHEST ABDOMEN PELVIS WITH IV CONTRAST (XPD)    CLINICAL HISTORY:  Metastatic disease evaluation; Personal history of other malignant neoplasm of large intestine    TECHNIQUE:  Axial images of the chest, abdomen, and pelvis were acquired after the use of 75 cc Utuf186 IV contrast. 450 cc oral barium suspension was also administered.  Coronal and sagittal reconstructions were also obtained    COMPARISON:  CT 05/06/2024, 04/29/2024, MRI 12/19/2022, CT 05/06/2022    FINDINGS:  Thoracic soft tissues: Normal thyroid. No axillary lymphadenopathy.    Geovanna/Mediastinum: No mediastinal or hilar lymphadenopathy.    Aorta: Dense calcification at the aortic arch.  Calcification of the aortic valve leaflets.  Moderate calcification of the aortic branch vessels and aortic arch.    Heart: Normal in size. No pericardial effusion. Moderate calcific coronary atherosclerosis.    Lungs: Patient with colon adenocarcinoma and biopsy-proven pulmonary carcinoid tumor (right lower lobe).  Numerous bilateral solid and ground-glass pulmonary nodules which are overall stable in size and distribution compared to CT 05/06/2022, but the majority are increased in solid components since that time.  The largest pulmonary nodule measures 0.8 cm in the right lung (6-363).  Pulmonary nodules are nonspecific but in the setting of known pulmonary carcinoid, diffuse idiopathic pulmonary neuroendocrine cell hyperplasia may be a consideration.  No definite new or enlarging nodules to raise suspicion for metastatic colon adenocarcinoma.    Mild bilateral mosaic attenuation and interlobular septal thickening, most pronounced at the lung bases.  No pleural fluid or  pneumothorax.    Esophagus: Small hiatal hernia.    Stomach and duodenum: Unremarkable.    Liver: Normal in size and contour.  Stable hepatic cysts, similar to MRI 12/19/2022.    Gallbladder: Cholecystectomy.    Bile Ducts: No evidence of dilated ducts.    Pancreas: Evidence of chronic pancreatitis with a atrophic calcified pancreas.  Multiple cystic appearing lesions in the pancreas, the largest measures 1.8 cm in the pancreatic tail (3-59) and has enlarged since MRI 12/19/2022.  There is continued severe diffuse pancreatic ductal dilation.    Spleen: Stable hypodensity in the spleen, previously characterized as a hemangioma on prior MRI.    Adrenals: Unremarkable.    Kidneys/Ureters: Normal in size and location. Small bilateral cysts, similar to prior MRI.  No hydronephrosis or nephrolithiasis. No ureteral dilatation.    Bladder: No evidence of wall thickening.    Reproductive organs: Hysterectomy.    Bowel/Mesentery: Postoperative change of right hemicolectomy and pelvic lymph node dissection.  Bowel is normal in caliber.  No evidence of obstruction or inflammation.    Peritoneum: No intraperitoneal free air or fluid.    Lymph nodes: No lymphadenopathy.    Abdominal wall:  Unremarkable.    Vasculature: No aneurysm. Advanced calcific atherosclerosis through the abdominal aorta and its branches.    Bones: Stable postoperative change of left hip valeri fixation.  Curvature of the spine with degenerative changes..  No fracture.  No suspicious osseous lesions.  Impression: Patient with pulmonary carcinoid tumor and colon adenocarcinoma status post right hemicolectomy.    No evidence of new metastatic disease in the chest, abdomen, or pelvis.    Multiple bilateral solid and ground-glass pulmonary nodules, overall unchanged in size and distribution since 2022.  Differential considerations discussed above.  No new or enlarging pulmonary nodules.  Continued attention on follow-up is advised.    Multiple large cystic lesions  in the pancreas, 1 of which has enlarged from 2022.  Further evaluation could be obtained with MRI/MRCP if clinically warranted.    Other findings as above.    This report was flagged in Epic as abnormal.    Electronically signed by resident: Aleja Franco  Date:    10/04/2024  Time:    13:15    Electronically signed by: Danish Garrett MD  Date:    10/05/2024  Time:    10:50            Diagnoses:       1. History of colon cancer    2. Primary hypertension    3. Carcinoid bronchial adenoma, right    4. Senile osteoporosis    5. Pancreatic insufficiency    6. Pancreatic cyst                          Assessment and Plan:     1. History of colon cancer  Overview:  Right sided colon cancer diagnosed in setting of LBO requiring urgent hemicolectomy 10/25/2022 with path showing pT4aN0 disease. CT chest 12/7/22 with possible lung metastases and MR liver with indeterminate liver lesions. 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 Patient elected for surveillance.      Assessment & Plan:  - Remains RICKY  - FU 6 months CT torso and labs      2. Primary hypertension  Overview:  Home medications include losartan    Assessment & Plan:  - Encouraged home monitoring and PCP follow up      3. Carcinoid bronchial adenoma, right  Overview:  Incidentally noted on imaging Undergoing surveillance 12/2022 and biopsy proven 01/2023. On imaging review appears indolent. Plan for surveillance    Assessment & Plan:  - No interval change on recent imaging      4. Senile osteoporosis  Assessment & Plan:  - Continues on Prolia with her PCP  - Continues on vitamin D      5. Pancreatic insufficiency  Overview:  Diagnosed in setting of weight loss and bowel incontinence 2022. Has chronic pancreatitis on imaging. Home medications include Creon    Assessment & Plan:  - Continues on Croen      6. Pancreatic cyst  Assessment & Plan:  - Slightly progressed  - No  concerning symptoms - discussed MRCP now vs interval CT in 6 months  - Will go ahead with CT in 6 months                     Route Chart for Scheduling    Med Onc Chart Routing      Follow up with physician    Follow up with DOMONIQUE 6 months.   Infusion scheduling note    Injection scheduling note    Labs CBC, CMP and CEA   Scheduling:  Preferred lab:  Lab interval:     Imaging    Pharmacy appointment    Other referrals                    Therapy Plan Information  DENOSUMAB (PROLIA) Q6M for Senile osteoporosis, noted on 11/9/2017  Medications  denosumab (PROLIA) injection 60 mg  60 mg, Subcutaneous, Every 26 weeks      No therapy plan of the specified type found.    No therapy plan of the specified type found.       Nakul English MD  Hematology/Oncology  [facility] - [facility]

## 2024-10-14 DIAGNOSIS — K86.1 CHRONIC PANCREATITIS, UNSPECIFIED PANCREATITIS TYPE: ICD-10-CM

## 2024-10-21 RX ORDER — PANCRELIPASE 24000; 76000; 120000 [USP'U]/1; [USP'U]/1; [USP'U]/1
CAPSULE, DELAYED RELEASE PELLETS ORAL
Qty: 200 CAPSULE | Refills: 8 | Status: SHIPPED | OUTPATIENT
Start: 2024-10-21

## 2024-10-29 ENCOUNTER — OFFICE VISIT (OUTPATIENT)
Dept: DERMATOLOGY | Facility: CLINIC | Age: 89
End: 2024-10-29
Payer: MEDICARE

## 2024-10-29 VITALS — BODY MASS INDEX: 19.3 KG/M2 | WEIGHT: 116 LBS

## 2024-10-29 DIAGNOSIS — D49.2 ABNORMAL SKIN GROWTH: ICD-10-CM

## 2024-10-29 DIAGNOSIS — D48.5 NEOPLASM OF UNCERTAIN BEHAVIOR OF SKIN: Primary | ICD-10-CM

## 2024-10-29 DIAGNOSIS — L29.9 PRURITUS: ICD-10-CM

## 2024-10-29 PROCEDURE — 88305 TISSUE EXAM BY PATHOLOGIST: CPT | Performed by: DERMATOLOGY

## 2024-10-29 PROCEDURE — 99999 PR PBB SHADOW E&M-EST. PATIENT-LVL IV: CPT | Mod: PBBFAC,,, | Performed by: DERMATOLOGY

## 2024-10-29 PROCEDURE — 88305 TISSUE EXAM BY PATHOLOGIST: CPT | Mod: 26,,, | Performed by: DERMATOLOGY

## 2024-10-29 PROCEDURE — 11102 TANGNTL BX SKIN SINGLE LES: CPT | Mod: PBBFAC,PO | Performed by: DERMATOLOGY

## 2024-10-29 PROCEDURE — 99214 OFFICE O/P EST MOD 30 MIN: CPT | Mod: PBBFAC,PO,25 | Performed by: DERMATOLOGY

## 2024-10-31 LAB
FINAL PATHOLOGIC DIAGNOSIS: NORMAL
GROSS: NORMAL
Lab: NORMAL
MICROSCOPIC EXAM: NORMAL

## 2024-11-01 ENCOUNTER — PATIENT MESSAGE (OUTPATIENT)
Dept: DERMATOLOGY | Facility: CLINIC | Age: 89
End: 2024-11-01
Payer: MEDICARE

## 2024-11-03 ENCOUNTER — OFFICE VISIT (OUTPATIENT)
Dept: URGENT CARE | Facility: CLINIC | Age: 89
End: 2024-11-03
Payer: MEDICARE

## 2024-11-03 VITALS
OXYGEN SATURATION: 98 % | WEIGHT: 115.94 LBS | DIASTOLIC BLOOD PRESSURE: 75 MMHG | SYSTOLIC BLOOD PRESSURE: 177 MMHG | BODY MASS INDEX: 19.32 KG/M2 | HEART RATE: 71 BPM | TEMPERATURE: 98 F | RESPIRATION RATE: 16 BRPM | HEIGHT: 65 IN

## 2024-11-03 DIAGNOSIS — L03.114 CELLULITIS OF LEFT UPPER ARM: Primary | ICD-10-CM

## 2024-11-03 DIAGNOSIS — Z98.890: ICD-10-CM

## 2024-11-03 PROCEDURE — 99213 OFFICE O/P EST LOW 20 MIN: CPT | Mod: S$GLB,,, | Performed by: NURSE PRACTITIONER

## 2024-11-03 RX ORDER — DOXYCYCLINE 100 MG/1
100 CAPSULE ORAL EVERY 12 HOURS
Qty: 14 CAPSULE | Refills: 0 | Status: SHIPPED | OUTPATIENT
Start: 2024-11-03 | End: 2024-11-10

## 2024-11-03 RX ORDER — DOXYCYCLINE 100 MG/1
100 CAPSULE ORAL EVERY 12 HOURS
Qty: 14 CAPSULE | Refills: 0 | Status: SHIPPED | OUTPATIENT
Start: 2024-11-03 | End: 2024-11-03 | Stop reason: RX

## 2024-11-03 RX ORDER — MUPIROCIN 20 MG/G
OINTMENT TOPICAL 3 TIMES DAILY
Qty: 30 G | Refills: 0 | Status: SHIPPED | OUTPATIENT
Start: 2024-11-03 | End: 2024-11-03 | Stop reason: RX

## 2024-11-03 RX ORDER — MUPIROCIN 20 MG/G
OINTMENT TOPICAL 3 TIMES DAILY
Qty: 30 G | Refills: 0 | Status: SHIPPED | OUTPATIENT
Start: 2024-11-03 | End: 2024-11-10

## 2024-11-03 NOTE — PROGRESS NOTES
"Subjective:      Patient ID: Sussy Costa is a 89 y.o. female.    Vitals:  height is 5' 5" (1.651 m) and weight is 52.6 kg (115 lb 15.4 oz). Her tympanic temperature is 98 °F (36.7 °C). Her blood pressure is 177/75 (abnormal) and her pulse is 71. Her respiration is 16 and oxygen saturation is 98%.     Chief Complaint: Mass    88 yo female presents to  today with c/o left upper arm redness and puffiness after having abnormal growth removed from the site, five days ago. Denies fever and drainage, but states it has some yellow on it today.   She is washing with dial soap and using OTC antibiotic ointment. She is concerned of a skin infection.   She mentions growth resulted as benign.     Mass  This is a new problem. The current episode started in the past 7 days. The problem has been rapidly worsening. Nothing aggravates the symptoms. Treatments tried: Prescribed antibiotic.       Skin:  Positive for wound, skin thickening/induration and erythema.      Objective:     Physical Exam   Constitutional: She is oriented to person, place, and time.  Non-toxic appearance. She does not appear ill. No distress.   HENT:   Head: Normocephalic.   Nose: Nose normal.   Mouth/Throat: Mucous membranes are moist.   Cardiovascular: Normal rate.   Pulmonary/Chest: Effort normal.   Abdominal: Normal appearance.   Musculoskeletal: Normal range of motion.         General: Normal range of motion.   Neurological: She is alert and oriented to person, place, and time.   Skin: Skin is warm, dry and not diaphoretic. erythema         Comments: Wound noted to left upper arm, with surrounding erythema and (mild) cellulitis.   No active drainage noted.   There is surrounding (mild) petechiae from where tape was holding the gauze in place.    Psychiatric: Her behavior is normal. Mood normal.   Nursing note and vitals reviewed.      Assessment:     1. Cellulitis of left upper arm    2. Status post surgical removal of neoplasm of skin        Plan: "       Cellulitis of left upper arm  -     doxycycline (VIBRAMYCIN) 100 MG Cap; Take 1 capsule (100 mg total) by mouth every 12 (twelve) hours. for 7 days  Dispense: 14 capsule; Refill: 0  -     mupirocin (BACTROBAN) 2 % ointment; Apply topically 3 (three) times daily. for 7 days  Dispense: 30 g; Refill: 0    Status post surgical removal of neoplasm of skin  Comments:  of left upper arm  Orders:  -     doxycycline (VIBRAMYCIN) 100 MG Cap; Take 1 capsule (100 mg total) by mouth every 12 (twelve) hours. for 7 days  Dispense: 14 capsule; Refill: 0  -     mupirocin (BACTROBAN) 2 % ointment; Apply topically 3 (three) times daily. for 7 days  Dispense: 30 g; Refill: 0    Other orders  -     Discontinue: doxycycline (VIBRAMYCIN) 100 MG Cap; Take 1 capsule (100 mg total) by mouth every 12 (twelve) hours. for 7 days  Dispense: 14 capsule; Refill: 0  -     Discontinue: mupirocin (BACTROBAN) 2 % ointment; Apply topically 3 (three) times daily. for 7 days  Dispense: 30 g; Refill: 0      Patient Instructions   Keep wound clean and dry  Ok to wash site, but dry very well after  Do not allow site to get saturated with water (a quick wash is ok--but dry well)  Keep covered for first couple or few days (or longer if you have any oozing)  Monitor site and follow up with any: increased redness, swelling, pus-like drainage, red-streaking, or if you develop fever

## 2024-11-03 NOTE — PATIENT INSTRUCTIONS
Keep wound clean and dry  Ok to wash site, but dry very well after  Do not allow site to get saturated with water (a quick wash is ok--but dry well)  Keep covered for first couple or few days (or longer if you have any oozing)  Monitor site and follow up with any: increased redness, swelling, pus-like drainage, red-streaking, or if you develop fever

## 2024-11-18 ENCOUNTER — TELEPHONE (OUTPATIENT)
Dept: OTOLARYNGOLOGY | Facility: CLINIC | Age: 89
End: 2024-11-18
Payer: MEDICARE

## 2024-11-18 NOTE — TELEPHONE ENCOUNTER
Spoke with [facility]. Provided verbal order for refill on budesonide. Verbalization was understood.     ----- Message from Raghu sent at 11/15/2024 10:04 AM CST -----  Contact: pt  .Type:  Needs Medical Advice    Who Called: pt    Pharmacy name and phone #:  [facility]- [place] - [address]   Phone: [phone]  Fax: [phone]  Would the patient rather a call back or a response via [facility]?  Call back  Best Call Back Number: [phone]  Additional Information:  Pt.  needs a refill on her Budesonide 0.8 mg capsules

## 2024-11-18 NOTE — TELEPHONE ENCOUNTER
----- Message from Raghu sent at 11/15/2024 10:01 AM CST -----  Contact: Pt  .Type:  Needs Medical Advice    Who Called: pt    Pharmacy name and phone #:  [facility] - [place] - [address]   Phone: [phone]  Fax: [phone]  Would the patient rather a call back or a response via [facility]?  Call back  Best Call Back Number: [phone]  Additional Information: Pt. Needs a refill on her Levocetirizine 5mg.

## 2024-11-19 ENCOUNTER — OFFICE VISIT (OUTPATIENT)
Dept: PULMONOLOGY | Facility: CLINIC | Age: 89
End: 2024-11-19
Payer: MEDICARE

## 2024-11-19 VITALS
OXYGEN SATURATION: 96 % | BODY MASS INDEX: 19.69 KG/M2 | SYSTOLIC BLOOD PRESSURE: 118 MMHG | WEIGHT: 118.19 LBS | HEART RATE: 93 BPM | DIASTOLIC BLOOD PRESSURE: 62 MMHG | HEIGHT: 65 IN

## 2024-11-19 DIAGNOSIS — J45.40 MODERATE PERSISTENT ASTHMA WITHOUT COMPLICATION: Primary | Chronic | ICD-10-CM

## 2024-11-19 DIAGNOSIS — R91.8 MULTIPLE LUNG NODULES ON CT: ICD-10-CM

## 2024-11-19 PROCEDURE — 99213 OFFICE O/P EST LOW 20 MIN: CPT | Mod: S$PBB,,, | Performed by: INTERNAL MEDICINE

## 2024-11-19 PROCEDURE — 99999 PR PBB SHADOW E&M-EST. PATIENT-LVL IV: CPT | Mod: PBBFAC,,, | Performed by: INTERNAL MEDICINE

## 2024-11-19 PROCEDURE — 99214 OFFICE O/P EST MOD 30 MIN: CPT | Mod: PBBFAC | Performed by: INTERNAL MEDICINE

## 2024-11-19 NOTE — PROGRESS NOTES
Subjective:      Patient ID: Sussy Costa is a 89 y.o. female.    Chief Complaint: No chief complaint on file.    HPI  The patient was seen/evaluated in person at this visit on 11/19/2024.    Ms. Costa returns to [facility] as an Established Patient for ongoing follow up of COPD -- likely longstanding asthma variant.  She was last seen by Dr. Posada in February 2024.  Prior to that visit, she had previously seen Dr. Peguero off/on from 2011 to 2022 until seeing Dr. Posada in July 2023.   She returns to Pulmonary Clinic for follow of asthma and abnormal chest CT with lung nodules.    She reports breathing comfortably and feels that the Trelegy is working well for her.  She is doing well on Trelegy since started by Dr. Posada in July 2023.   She has nebulized albuterol and MDI but almost never uses either.  She has no history of smoking    2024 has been difficult for her after hip fracture after falling earlier this year; walking with a cane when away from the house.    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 Posada (2/14/2024):    Sussy Costa is a 88 y.o. female who presents for follow up of her asthma.   Last seen in the office in October of 2023.  She was admitted to the hospital for three days for treatment of CAP in November 2023.  Recently fell and had swelling and pain in her left foot.      Report that overall her respiratory status is stable.   She is happy with the Trelegy.  She has a little cough that has developed over the last few days, but feels that it's not her asthma- and more consistent with her allergies.   _______________________________________  Cough has improved since using trelegy.  Never obtained the flutter valve.  Decided to defer  treatment with Fasenra.     ____________________________________  She reports chest congestion since april  Mucinex helps some, but she still has chest congestion  Previously followed by Dr. Peguero     +allergies.   Using advair twice a day.      History of carcinoid s/p surgery.  Some lung mets on imaging.      Review of Systems  Objective:     Physical Exam   Constitutional: She is oriented to person, place, and time. No distress.   Cardiovascular: Normal rate, regular rhythm and normal heart sounds. Exam reveals no gallop.   No murmur heard.  Pulmonary/Chest: Normal expansion, symmetric chest wall expansion, effort normal and breath sounds normal. No stridor. No respiratory distress. She has no decreased breath sounds. She has no wheezes. She has no rhonchi. She has no rales.   Musculoskeletal:         General: No edema.   Neurological: She is alert and oriented to person, place, and time. Gait normal.   Skin: No cyanosis. Nails show no clubbing.   Psychiatric: She has a normal mood and affect. Judgment normal.   Nursing note and vitals reviewed.    Personal Diagnostic Review    Past PFTs have shown significant obstruction with superimpose restriction and severe DLCO impairment.    PFTs 8/05/2015 5/11/2022 7/13/2023   FVC  (pre-BD) 1.94 liters 1.35 liters 1.62 liters   FVC%  69% 54% 66%   FEV1 (pre-BD) 1.09 liters 0.73 liters 0.94 liters   FEV1%  51% 39% 51%   FEV1/FVC  56 54 58   FVC (post-BD)   1.65 liters   FVC%   + 1.5%   FEV1 (post-BD)   0.97 liters   FEV1%   + 4%   TLC    2.94 liters   TLC%    58%   RV    1.30 liters   RV%    55%   DLCO    (uncorr)   7.21 mL/mmHg/min   DLCO%    37%   VA   2.47 liters   IVC   1.52 liters     Chest CT (contrast) from 10/04/2024 =>  Geovanna/Mediastinum: No mediastinal or hilar lymphadenopathy.  Lungs: Patient with colon adenocarcinoma and biopsy-proven pulmonary carcinoid tumor (right lower lobe).  Numerous bilateral solid and ground-glass pulmonary nodules which are overall  stable in size and distribution compared to CT 05/06/2022, but the majority are increased in solid components since that time.  The largest pulmonary nodule measures 0.8 cm in the right lung (6-363).  Pulmonary nodules are nonspecific but in the setting of known pulmonary carcinoid, diffuse idiopathic pulmonary neuroendocrine cell hyperplasia may be a consideration.  No definite new or enlarging nodules to raise suspicion for metastatic colon adenocarcinoma.  Mild bilateral mosaic attenuation and interlobular septal thickening, most pronounced at the lung bases.  No pleural fluid or pneumothorax.  Esophagus: Small hiatal hernia.    Impression:     - Patient with pulmonary carcinoid tumor and colon adenocarcinoma status post right hemicolectomy -- No evidence of new metastatic disease in the chest, abdomen, or pelvis.  - Multiple bilateral solid and ground-glass pulmonary nodules, overall unchanged in size and distribution since 2022.  Differential considerations discussed above.  No new or enlarging pulmonary nodules.  Continued attention on follow-up is advised.           03/30/24 19:36 04/18/24 17:56 04/29/24 12:36 04/30/24 05:10 05/01/24 05:31 05/02/24 07:24   Eos # 0.0 0.4 0.1 0.7 (H) 0.8 (H) 0.8 (H)             12/20/2024     1:06 PM 12/8/2024    11:15 PM 12/8/2024    10:00 PM 12/8/2024     9:30 PM 12/8/2024     8:04 PM 12/8/2024     7:00 PM 12/8/2024     6:00 PM   Pulmonary Function Tests   SpO2 98 % 98 % 95 % 98 % 98 % 99 % 99 %        Assessment:     1. Moderate persistent asthma without complication    2. Multiple lung nodules on CT         Outpatient Encounter Medications as of 11/19/2024   Medication Sig Dispense Refill    ACCU-CHEK GUIDE TEST STRIPS Strp TEST FOUR TIMES DAILY WITH MEALS AND NIGHTLY      acetaminophen (TYLENOL) 500 MG tablet Take 2 tablets (1,000 mg total) by mouth every 8 (eight) hours.  0    ascorbic acid, vitamin C, (VITAMIN C) 500 MG tablet Take 500 mg by mouth once daily.       "atorvastatin (LIPITOR) 20 MG tablet Take 1 tablet (20 mg total) by mouth every evening. 90 tablet 0    BD ULTRA-FINE SHORT PEN NEEDLE 31 gauge x 5/16" Ndle USE with insulin pen 5 TIMES A DAY      denosumab (PROLIA) 60 mg/mL Syrg Inject 1 mL (60 mg total) into the skin every 6 (six) months. Hold at rehab      empagliflozin (JARDIANCE) 10 mg tablet Take 1 tablet (10 mg total) by mouth once daily. 30 tablet 11    fluticasone-umeclidin-vilanter (TRELEGY ELLIPTA) 200-62.5-25 mcg inhaler Inhale 1 puff into the lungs once daily. 90 each 3    lancets (ACCU-CHEK SOFTCLIX LANCETS) Misc 1 each by Misc.(Non-Drug; Combo Route) route 4 (four) times daily. 400 each 3    latanoprost 0.005 % ophthalmic solution Place 1 drop into both eyes every evening.      lipase-protease-amylase 24,000-76,000-120,000 units (CREON) 24,000-76,000 -120,000 unit capsule TAKE 2 CAPSULES WITH MEALS AND 1 CAPSULE WITH SNACKS. 200 capsule 8    losartan (COZAAR) 100 MG tablet Take 1 tablet (100 mg total) by mouth once daily. 90 tablet 3    magnesium oxide (MAG-OX) 400 mg (241.3 mg magnesium) tablet Take 400 mg by mouth 2 (two) times daily.      montelukast (SINGULAIR) 10 mg tablet Take 1 tablet (10 mg total) by mouth every evening. 90 tablet 1    olopatadine (PATANOL) 0.1 % ophthalmic solution Place 1 drop into both eyes 2 (two) times daily as needed for Allergies. 1 Drops Ophthalmic Twice a day      senna-docusate 8.6-50 mg (PERICOLACE) 8.6-50 mg per tablet Take 1 tablet by mouth 2 (two) times daily.      vitamin D (VITAMIN D3) 1000 units Tab Take 1,000 Units by mouth once daily.      [DISCONTINUED] insulin aspart U-100 (NOVOLOG FLEXPEN U-100 INSULIN) 100 unit/mL (3 mL) InPn pen Inject 5 Units into the skin 3 (three) times daily with meals. (Patient taking differently: Inject 3 units into skin every morning, 5 units at lunch & 4 units at supper.) 13.5 mL 3    [DISCONTINUED] insulin glargine U-100, Lantus, (LANTUS SOLOSTAR U-100 INSULIN) 100 unit/mL (3 " mL) InPn pen Inject 6 Units into the skin once daily AND 5 Units every evening. (Patient taking differently: Inject 5 Units into the skin once daily AND 5 Units every evening.) 10 mL 3    [DISCONTINUED] triamcinolone acetonide 0.1% (KENALOG) 0.1 % cream Apply topically 2 (two) times daily. 80 g 0    [] doxycycline (VIBRAMYCIN) 100 MG Cap Take 1 capsule (100 mg total) by mouth every 12 (twelve) hours. for 7 days 14 capsule 0    [] mupirocin (BACTROBAN) 2 % ointment Apply topically 3 (three) times daily. for 7 days 30 g 0    [DISCONTINUED] pregabalin (LYRICA) 25 MG capsule Take 1 capsule (25 mg total) by mouth 2 (two) times daily. 60 capsule 0    [DISCONTINUED] risedronate (ACTONEL) 35 MG tablet Take 1 tablet (35 mg total) by mouth every 7 days. 12 tablet 3     No facility-administered encounter medications on file as of 2024.     No orders of the defined types were placed in this encounter.      Plan:     Stable from the asthma standpoint on current regimen despite impaired PFTs in 2023.  Chest CT is stable without evidence of progression in nodules despite history of colon cancer and carcinoid (pulmonary).  She is limited by hip fracture earlier this year.    Problem List Items Addressed This Visit       Moderate persistent asthma without complication - Primary (Chronic)    Overview     - Symptomatically well controlled on Trelegy, singulair, and albuterol prn.  - Decline in lung function by PFTs in 2023  - Normal IgE  - Occasional peripheral eosinophilia on CBCs         Current Assessment & Plan     Continue current regimen.  Recheck PFTs at future visit.         Multiple lung nodules on CT    Overview     - History of multiple lung nodules on past chest CT scans dating back to 2011.  - IR lung biopsy from 2023 positive for low grade carcinoid  - History of colon cancer (node negative) on right colectomy in 2022         Current Assessment & Plan     Chest CTs  reviewed from 10/2024; 12/2022; 5/2022; and 10/2011.  No significant interval change in appearance of sub-centimeter solid and sub-solid nodules over that timeframe.    CT surveillance as appropriate for follow up of colon cancer -- otherwise would plan repeat non-contrast chest CT in 2 years.            Total Time = 25 minutes    Brian Cary MD  Pulmonary Disease  [facility] - [facility] 9th Fl

## 2024-11-21 ENCOUNTER — OFFICE VISIT (OUTPATIENT)
Dept: PODIATRY | Facility: CLINIC | Age: 89
End: 2024-11-21
Payer: MEDICARE

## 2024-11-21 VITALS
SYSTOLIC BLOOD PRESSURE: 118 MMHG | HEART RATE: 90 BPM | BODY MASS INDEX: 19.44 KG/M2 | WEIGHT: 116.88 LBS | DIASTOLIC BLOOD PRESSURE: 62 MMHG

## 2024-11-21 DIAGNOSIS — B35.1 ONYCHOMYCOSIS DUE TO DERMATOPHYTE: ICD-10-CM

## 2024-11-21 DIAGNOSIS — L84 CORN OR CALLUS: ICD-10-CM

## 2024-11-21 DIAGNOSIS — E11.49 TYPE II DIABETES MELLITUS WITH NEUROLOGICAL MANIFESTATIONS: Primary | ICD-10-CM

## 2024-11-21 PROCEDURE — 11056 PARNG/CUTG B9 HYPRKR LES 2-4: CPT | Mod: PBBFAC | Performed by: PODIATRIST

## 2024-11-21 PROCEDURE — 99212 OFFICE O/P EST SF 10 MIN: CPT | Mod: PBBFAC | Performed by: PODIATRIST

## 2024-11-21 PROCEDURE — 11721 DEBRIDE NAIL 6 OR MORE: CPT | Mod: PBBFAC | Performed by: PODIATRIST

## 2024-11-21 PROCEDURE — 99999 PR PBB SHADOW E&M-EST. PATIENT-LVL II: CPT | Mod: PBBFAC,,, | Performed by: PODIATRIST

## 2024-11-25 ENCOUNTER — HOSPITAL ENCOUNTER (INPATIENT)
Facility: HOSPITAL | Age: 89
LOS: 4 days | Discharge: NURSING, REHAB OR LONG-TERM CARE | DRG: 481 | End: 2024-11-29
Attending: EMERGENCY MEDICINE | Admitting: EMERGENCY MEDICINE
Payer: MEDICARE

## 2024-11-25 ENCOUNTER — ANESTHESIA EVENT (OUTPATIENT)
Dept: SURGERY | Facility: HOSPITAL | Age: 89
End: 2024-11-25
Payer: MEDICARE

## 2024-11-25 DIAGNOSIS — R07.9 CHEST PAIN: ICD-10-CM

## 2024-11-25 DIAGNOSIS — E87.20 METABOLIC ACIDOSIS: ICD-10-CM

## 2024-11-25 DIAGNOSIS — R55 SYNCOPE: ICD-10-CM

## 2024-11-25 DIAGNOSIS — S72.001A CLOSED FRACTURE OF RIGHT HIP, INITIAL ENCOUNTER: ICD-10-CM

## 2024-11-25 DIAGNOSIS — R80.9 TYPE 2 DIABETES MELLITUS WITH MICROALBUMINURIA, WITHOUT LONG-TERM CURRENT USE OF INSULIN: Chronic | ICD-10-CM

## 2024-11-25 DIAGNOSIS — S72.141A CLOSED DISPLACED INTERTROCHANTERIC FRACTURE OF RIGHT FEMUR, INITIAL ENCOUNTER: ICD-10-CM

## 2024-11-25 DIAGNOSIS — S72.142D CLOSED 2-PART INTERTROCHANTERIC FRACTURE OF LEFT FEMUR WITH ROUTINE HEALING, SUBSEQUENT ENCOUNTER: Primary | ICD-10-CM

## 2024-11-25 DIAGNOSIS — I48.0 PAROXYSMAL ATRIAL FIBRILLATION: Chronic | ICD-10-CM

## 2024-11-25 DIAGNOSIS — M25.559 HIP PAIN: ICD-10-CM

## 2024-11-25 DIAGNOSIS — S51.811A LACERATION OF RIGHT FOREARM, INITIAL ENCOUNTER: ICD-10-CM

## 2024-11-25 DIAGNOSIS — N17.9 AKI (ACUTE KIDNEY INJURY): ICD-10-CM

## 2024-11-25 DIAGNOSIS — E11.29 TYPE 2 DIABETES MELLITUS WITH MICROALBUMINURIA, WITHOUT LONG-TERM CURRENT USE OF INSULIN: Chronic | ICD-10-CM

## 2024-11-25 PROBLEM — R93.7: Status: ACTIVE | Noted: 2024-11-25

## 2024-11-25 PROBLEM — D64.9 NORMOCYTIC ANEMIA: Status: ACTIVE | Noted: 2024-11-25

## 2024-11-25 PROBLEM — D72.829 LEUKOCYTOSIS: Status: ACTIVE | Noted: 2024-11-25

## 2024-11-25 PROBLEM — M80.00XA OSTEOPOROSIS WITH CURRENT PATHOLOGICAL FRACTURE: Status: ACTIVE | Noted: 2024-11-25

## 2024-11-25 PROBLEM — E87.6 HYPOKALEMIA: Status: ACTIVE | Noted: 2024-11-25

## 2024-11-25 LAB
25(OH)D3+25(OH)D2 SERPL-MCNC: 24 NG/ML (ref 30–96)
ABO + RH BLD: NORMAL
ALBUMIN SERPL BCP-MCNC: 3.6 G/DL (ref 3.5–5.2)
ALP SERPL-CCNC: 61 U/L (ref 40–150)
ALT SERPL W/O P-5'-P-CCNC: 29 U/L (ref 10–44)
ANION GAP SERPL CALC-SCNC: 9 MMOL/L (ref 8–16)
ANISOCYTOSIS BLD QL SMEAR: SLIGHT
APTT PPP: 25.2 SEC (ref 21–32)
AST SERPL-CCNC: 29 U/L (ref 10–40)
BACTERIA #/AREA URNS AUTO: NORMAL /HPF
BASOPHILS # BLD AUTO: 0.05 K/UL (ref 0–0.2)
BASOPHILS # BLD AUTO: 0.09 K/UL (ref 0–0.2)
BASOPHILS NFR BLD: 0.5 % (ref 0–1.9)
BASOPHILS NFR BLD: 0.5 % (ref 0–1.9)
BILIRUB SERPL-MCNC: 0.5 MG/DL (ref 0.1–1)
BILIRUB UR QL STRIP: NEGATIVE
BLD GP AB SCN CELLS X3 SERPL QL: NORMAL
BNP SERPL-MCNC: 267 PG/ML (ref 0–99)
BUN SERPL-MCNC: 24 MG/DL (ref 8–23)
BURR CELLS BLD QL SMEAR: ABNORMAL
CALCIUM SERPL-MCNC: 9.5 MG/DL (ref 8.7–10.5)
CHLORIDE SERPL-SCNC: 115 MMOL/L (ref 95–110)
CLARITY UR REFRACT.AUTO: CLEAR
CO2 SERPL-SCNC: 20 MMOL/L (ref 23–29)
COLOR UR AUTO: COLORLESS
CREAT SERPL-MCNC: 1 MG/DL (ref 0.5–1.4)
DIFFERENTIAL METHOD BLD: ABNORMAL
DIFFERENTIAL METHOD BLD: ABNORMAL
EOSINOPHIL # BLD AUTO: 0 K/UL (ref 0–0.5)
EOSINOPHIL # BLD AUTO: 1.7 K/UL (ref 0–0.5)
EOSINOPHIL NFR BLD: 0 % (ref 0–8)
EOSINOPHIL NFR BLD: 9.4 % (ref 0–8)
ERYTHROCYTE [DISTWIDTH] IN BLOOD BY AUTOMATED COUNT: 14.2 % (ref 11.5–14.5)
ERYTHROCYTE [DISTWIDTH] IN BLOOD BY AUTOMATED COUNT: 14.6 % (ref 11.5–14.5)
EST. GFR  (NO RACE VARIABLE): 54.2 ML/MIN/1.73 M^2
ESTIMATED AVG GLUCOSE: 123 MG/DL (ref 68–131)
GLUCOSE SERPL-MCNC: 161 MG/DL (ref 70–110)
GLUCOSE UR QL STRIP: ABNORMAL
HBA1C MFR BLD: 5.9 % (ref 4–5.6)
HCT VFR BLD AUTO: 24.7 % (ref 37–48.5)
HCT VFR BLD AUTO: 35.5 % (ref 37–48.5)
HGB BLD-MCNC: 11.2 G/DL (ref 12–16)
HGB BLD-MCNC: 8.1 G/DL (ref 12–16)
HGB UR QL STRIP: NEGATIVE
IMM GRANULOCYTES # BLD AUTO: 0.1 K/UL (ref 0–0.04)
IMM GRANULOCYTES # BLD AUTO: 0.11 K/UL (ref 0–0.04)
IMM GRANULOCYTES NFR BLD AUTO: 0.6 % (ref 0–0.5)
IMM GRANULOCYTES NFR BLD AUTO: 1 % (ref 0–0.5)
INR PPP: 1.1 (ref 0.8–1.2)
KETONES UR QL STRIP: NEGATIVE
LEUKOCYTE ESTERASE UR QL STRIP: NEGATIVE
LYMPHOCYTES # BLD AUTO: 0.6 K/UL (ref 1–4.8)
LYMPHOCYTES # BLD AUTO: 2.2 K/UL (ref 1–4.8)
LYMPHOCYTES NFR BLD: 12.2 % (ref 18–48)
LYMPHOCYTES NFR BLD: 5.6 % (ref 18–48)
MAGNESIUM SERPL-MCNC: 1.5 MG/DL (ref 1.6–2.6)
MCH RBC QN AUTO: 30.2 PG (ref 27–31)
MCH RBC QN AUTO: 31.4 PG (ref 27–31)
MCHC RBC AUTO-ENTMCNC: 31.5 G/DL (ref 32–36)
MCHC RBC AUTO-ENTMCNC: 32.8 G/DL (ref 32–36)
MCV RBC AUTO: 96 FL (ref 82–98)
MCV RBC AUTO: 96 FL (ref 82–98)
MICROSCOPIC COMMENT: NORMAL
MONOCYTES # BLD AUTO: 0.3 K/UL (ref 0.3–1)
MONOCYTES # BLD AUTO: 0.9 K/UL (ref 0.3–1)
MONOCYTES NFR BLD: 3 % (ref 4–15)
MONOCYTES NFR BLD: 5 % (ref 4–15)
NEUTROPHILS # BLD AUTO: 13.1 K/UL (ref 1.8–7.7)
NEUTROPHILS # BLD AUTO: 9.7 K/UL (ref 1.8–7.7)
NEUTROPHILS NFR BLD: 72.3 % (ref 38–73)
NEUTROPHILS NFR BLD: 89.9 % (ref 38–73)
NITRITE UR QL STRIP: NEGATIVE
NRBC BLD-RTO: 0 /100 WBC
NRBC BLD-RTO: 0 /100 WBC
OHS QRS DURATION: 116 MS
OHS QRS DURATION: 126 MS
OHS QTC CALCULATION: 492 MS
OHS QTC CALCULATION: 523 MS
OVALOCYTES BLD QL SMEAR: ABNORMAL
PH UR STRIP: 6 [PH] (ref 5–8)
PHOSPHATE SERPL-MCNC: 2.4 MG/DL (ref 2.7–4.5)
PLATELET # BLD AUTO: 216 K/UL (ref 150–450)
PLATELET # BLD AUTO: ABNORMAL K/UL (ref 150–450)
PLATELET BLD QL SMEAR: ABNORMAL
PMV BLD AUTO: 11.5 FL (ref 9.2–12.9)
PMV BLD AUTO: ABNORMAL FL (ref 9.2–12.9)
POCT GLUCOSE: 202 MG/DL (ref 70–110)
POCT GLUCOSE: 242 MG/DL (ref 70–110)
POIKILOCYTOSIS BLD QL SMEAR: SLIGHT
POTASSIUM SERPL-SCNC: 3.3 MMOL/L (ref 3.5–5.1)
PREALB SERPL-MCNC: 12 MG/DL (ref 20–43)
PROT SERPL-MCNC: 6.5 G/DL (ref 6–8.4)
PROT UR QL STRIP: NEGATIVE
PROTHROMBIN TIME: 12.3 SEC (ref 9–12.5)
RBC # BLD AUTO: 2.58 M/UL (ref 4–5.4)
RBC # BLD AUTO: 3.71 M/UL (ref 4–5.4)
RBC #/AREA URNS AUTO: 0 /HPF (ref 0–4)
SODIUM SERPL-SCNC: 144 MMOL/L (ref 136–145)
SP GR UR STRIP: 1.01 (ref 1–1.03)
SPECIMEN OUTDATE: NORMAL
SQUAMOUS #/AREA URNS AUTO: 0 /HPF
TRANSFERRIN SERPL-MCNC: 206 MG/DL (ref 200–375)
TROPONIN I SERPL DL<=0.01 NG/ML-MCNC: 0.02 NG/ML (ref 0–0.03)
TROPONIN I SERPL DL<=0.01 NG/ML-MCNC: 0.02 NG/ML (ref 0–0.03)
URN SPEC COLLECT METH UR: ABNORMAL
WBC # BLD AUTO: 10.81 K/UL (ref 3.9–12.7)
WBC # BLD AUTO: 18.08 K/UL (ref 3.9–12.7)
WBC #/AREA URNS AUTO: 0 /HPF (ref 0–5)
YEAST UR QL AUTO: NORMAL

## 2024-11-25 PROCEDURE — 71000015 HC POSTOP RECOV 1ST HR: Performed by: ORTHOPAEDIC SURGERY

## 2024-11-25 PROCEDURE — 71000033 HC RECOVERY, INTIAL HOUR: Performed by: ORTHOPAEDIC SURGERY

## 2024-11-25 PROCEDURE — 85025 COMPLETE CBC W/AUTO DIFF WBC: CPT | Mod: 91 | Performed by: HOSPITALIST

## 2024-11-25 PROCEDURE — 36000711: Performed by: ORTHOPAEDIC SURGERY

## 2024-11-25 PROCEDURE — C1769 GUIDE WIRE: HCPCS | Performed by: ORTHOPAEDIC SURGERY

## 2024-11-25 PROCEDURE — 63600175 PHARM REV CODE 636 W HCPCS: Performed by: ANESTHESIOLOGY

## 2024-11-25 PROCEDURE — 84484 ASSAY OF TROPONIN QUANT: CPT | Mod: 91

## 2024-11-25 PROCEDURE — 37000009 HC ANESTHESIA EA ADD 15 MINS: Performed by: ORTHOPAEDIC SURGERY

## 2024-11-25 PROCEDURE — 71000016 HC POSTOP RECOV ADDL HR: Performed by: ORTHOPAEDIC SURGERY

## 2024-11-25 PROCEDURE — 36000710: Performed by: ORTHOPAEDIC SURGERY

## 2024-11-25 PROCEDURE — 63600175 PHARM REV CODE 636 W HCPCS: Performed by: HOSPITALIST

## 2024-11-25 PROCEDURE — 25000003 PHARM REV CODE 250

## 2024-11-25 PROCEDURE — 99223 1ST HOSP IP/OBS HIGH 75: CPT | Mod: 57,,, | Performed by: ORTHOPAEDIC SURGERY

## 2024-11-25 PROCEDURE — 25000003 PHARM REV CODE 250: Performed by: HOSPITALIST

## 2024-11-25 PROCEDURE — 21400001 HC TELEMETRY ROOM

## 2024-11-25 PROCEDURE — 63600175 PHARM REV CODE 636 W HCPCS

## 2024-11-25 PROCEDURE — 83735 ASSAY OF MAGNESIUM: CPT

## 2024-11-25 PROCEDURE — 0HQDXZZ REPAIR RIGHT LOWER ARM SKIN, EXTERNAL APPROACH: ICD-10-PCS | Performed by: EMERGENCY MEDICINE

## 2024-11-25 PROCEDURE — 83036 HEMOGLOBIN GLYCOSYLATED A1C: CPT

## 2024-11-25 PROCEDURE — 93010 ELECTROCARDIOGRAM REPORT: CPT | Mod: ,,, | Performed by: INTERNAL MEDICINE

## 2024-11-25 PROCEDURE — 0QS636Z REPOSITION RIGHT UPPER FEMUR WITH INTRAMEDULLARY INTERNAL FIXATION DEVICE, PERCUTANEOUS APPROACH: ICD-10-PCS | Performed by: ORTHOPAEDIC SURGERY

## 2024-11-25 PROCEDURE — 96374 THER/PROPH/DIAG INJ IV PUSH: CPT

## 2024-11-25 PROCEDURE — 80053 COMPREHEN METABOLIC PANEL: CPT

## 2024-11-25 PROCEDURE — 25000003 PHARM REV CODE 250: Performed by: ANESTHESIOLOGY

## 2024-11-25 PROCEDURE — C1713 ANCHOR/SCREW BN/BN,TIS/BN: HCPCS | Performed by: ORTHOPAEDIC SURGERY

## 2024-11-25 PROCEDURE — 96376 TX/PRO/DX INJ SAME DRUG ADON: CPT

## 2024-11-25 PROCEDURE — 84100 ASSAY OF PHOSPHORUS: CPT

## 2024-11-25 PROCEDURE — 25000003 PHARM REV CODE 250: Performed by: NURSE ANESTHETIST, CERTIFIED REGISTERED

## 2024-11-25 PROCEDURE — 96375 TX/PRO/DX INJ NEW DRUG ADDON: CPT

## 2024-11-25 PROCEDURE — 99285 EMERGENCY DEPT VISIT HI MDM: CPT | Mod: 25

## 2024-11-25 PROCEDURE — 86901 BLOOD TYPING SEROLOGIC RH(D): CPT

## 2024-11-25 PROCEDURE — 85025 COMPLETE CBC W/AUTO DIFF WBC: CPT

## 2024-11-25 PROCEDURE — 85730 THROMBOPLASTIN TIME PARTIAL: CPT

## 2024-11-25 PROCEDURE — 84466 ASSAY OF TRANSFERRIN: CPT

## 2024-11-25 PROCEDURE — 27245 TREAT THIGH FRACTURE: CPT | Mod: RT,,, | Performed by: ORTHOPAEDIC SURGERY

## 2024-11-25 PROCEDURE — 25000003 PHARM REV CODE 250: Performed by: ORTHOPAEDIC SURGERY

## 2024-11-25 PROCEDURE — 27201423 OPTIME MED/SURG SUP & DEVICES STERILE SUPPLY: Performed by: ORTHOPAEDIC SURGERY

## 2024-11-25 PROCEDURE — 82306 VITAMIN D 25 HYDROXY: CPT

## 2024-11-25 PROCEDURE — 84134 ASSAY OF PREALBUMIN: CPT

## 2024-11-25 PROCEDURE — 63600175 PHARM REV CODE 636 W HCPCS: Performed by: NURSE ANESTHETIST, CERTIFIED REGISTERED

## 2024-11-25 PROCEDURE — 25000003 PHARM REV CODE 250: Performed by: PHYSICIAN ASSISTANT

## 2024-11-25 PROCEDURE — 83880 ASSAY OF NATRIURETIC PEPTIDE: CPT

## 2024-11-25 PROCEDURE — 93005 ELECTROCARDIOGRAM TRACING: CPT

## 2024-11-25 PROCEDURE — 37000008 HC ANESTHESIA 1ST 15 MINUTES: Performed by: ORTHOPAEDIC SURGERY

## 2024-11-25 PROCEDURE — 85610 PROTHROMBIN TIME: CPT

## 2024-11-25 PROCEDURE — 81001 URINALYSIS AUTO W/SCOPE: CPT | Performed by: HOSPITALIST

## 2024-11-25 DEVICE — LONG NAIL, RIGHT
Type: IMPLANTABLE DEVICE | Site: FEMUR | Status: FUNCTIONAL
Brand: GAMMA

## 2024-11-25 DEVICE — LOCKING SCREW
Type: IMPLANTABLE DEVICE | Site: FEMUR | Status: FUNCTIONAL
Brand: T2 ALPHA

## 2024-11-25 RX ORDER — EPHEDRINE SULFATE 50 MG/ML
INJECTION, SOLUTION INTRAVENOUS
Status: DISCONTINUED | OUTPATIENT
Start: 2024-11-25 | End: 2024-11-25

## 2024-11-25 RX ORDER — MUPIROCIN 20 MG/G
1 OINTMENT TOPICAL
Status: COMPLETED | OUTPATIENT
Start: 2024-11-25 | End: 2024-11-25

## 2024-11-25 RX ORDER — MIDAZOLAM HYDROCHLORIDE 1 MG/ML
.5-4 INJECTION, SOLUTION INTRAMUSCULAR; INTRAVENOUS
Status: DISCONTINUED | OUTPATIENT
Start: 2024-11-25 | End: 2024-11-25 | Stop reason: HOSPADM

## 2024-11-25 RX ORDER — MORPHINE SULFATE 2 MG/ML
2 INJECTION, SOLUTION INTRAMUSCULAR; INTRAVENOUS
Status: DISPENSED | OUTPATIENT
Start: 2024-11-25 | End: 2024-11-25

## 2024-11-25 RX ORDER — BACITRACIN ZINC 500 [USP'U]/G
1 OINTMENT TOPICAL
Status: COMPLETED | OUTPATIENT
Start: 2024-11-25 | End: 2024-11-25

## 2024-11-25 RX ORDER — HYDROMORPHONE HYDROCHLORIDE 1 MG/ML
0.2 INJECTION, SOLUTION INTRAMUSCULAR; INTRAVENOUS; SUBCUTANEOUS EVERY 5 MIN PRN
Status: DISCONTINUED | OUTPATIENT
Start: 2024-11-25 | End: 2024-11-25 | Stop reason: HOSPADM

## 2024-11-25 RX ORDER — POLYETHYLENE GLYCOL 3350 17 G/17G
17 POWDER, FOR SOLUTION ORAL DAILY
Status: DISCONTINUED | OUTPATIENT
Start: 2024-11-25 | End: 2024-11-25

## 2024-11-25 RX ORDER — INSULIN GLARGINE 100 [IU]/ML
5 INJECTION, SOLUTION SUBCUTANEOUS 2 TIMES DAILY
Status: DISCONTINUED | OUTPATIENT
Start: 2024-11-25 | End: 2024-11-26

## 2024-11-25 RX ORDER — MORPHINE SULFATE 2 MG/ML
2 INJECTION, SOLUTION INTRAMUSCULAR; INTRAVENOUS
Status: COMPLETED | OUTPATIENT
Start: 2024-11-25 | End: 2024-11-25

## 2024-11-25 RX ORDER — HYDRALAZINE HYDROCHLORIDE 50 MG/1
50 TABLET, FILM COATED ORAL EVERY 8 HOURS PRN
Status: DISCONTINUED | OUTPATIENT
Start: 2024-11-25 | End: 2024-11-29 | Stop reason: HOSPADM

## 2024-11-25 RX ORDER — CHOLECALCIFEROL (VITAMIN D3) 25 MCG
1000 TABLET ORAL DAILY
Status: DISCONTINUED | OUTPATIENT
Start: 2024-11-26 | End: 2024-11-29 | Stop reason: HOSPADM

## 2024-11-25 RX ORDER — GLUCAGON 1 MG
1 KIT INJECTION
Status: DISCONTINUED | OUTPATIENT
Start: 2024-11-25 | End: 2024-11-25 | Stop reason: HOSPADM

## 2024-11-25 RX ORDER — LIDOCAINE HYDROCHLORIDE 10 MG/ML
10 INJECTION, SOLUTION EPIDURAL; INFILTRATION; INTRACAUDAL; PERINEURAL
Status: COMPLETED | OUTPATIENT
Start: 2024-11-25 | End: 2024-11-25

## 2024-11-25 RX ORDER — METHOCARBAMOL 500 MG/1
500 TABLET, FILM COATED ORAL EVERY 6 HOURS PRN
Status: DISCONTINUED | OUTPATIENT
Start: 2024-11-25 | End: 2024-11-25

## 2024-11-25 RX ORDER — ATORVASTATIN CALCIUM 20 MG/1
20 TABLET, FILM COATED ORAL NIGHTLY
Status: DISCONTINUED | OUTPATIENT
Start: 2024-11-25 | End: 2024-11-29 | Stop reason: HOSPADM

## 2024-11-25 RX ORDER — BISACODYL 10 MG/1
10 SUPPOSITORY RECTAL DAILY PRN
Status: DISCONTINUED | OUTPATIENT
Start: 2024-11-25 | End: 2024-11-29 | Stop reason: HOSPADM

## 2024-11-25 RX ORDER — IBUPROFEN 200 MG
24 TABLET ORAL
Status: DISCONTINUED | OUTPATIENT
Start: 2024-11-25 | End: 2024-11-29 | Stop reason: HOSPADM

## 2024-11-25 RX ORDER — OXYCODONE HYDROCHLORIDE 5 MG/1
5 TABLET ORAL
Status: DISCONTINUED | OUTPATIENT
Start: 2024-11-25 | End: 2024-11-25 | Stop reason: HOSPADM

## 2024-11-25 RX ORDER — PROCHLORPERAZINE EDISYLATE 5 MG/ML
2.5 INJECTION INTRAMUSCULAR; INTRAVENOUS EVERY 6 HOURS PRN
Status: DISCONTINUED | OUTPATIENT
Start: 2024-11-25 | End: 2024-11-25

## 2024-11-25 RX ORDER — METHOCARBAMOL 500 MG/1
500 TABLET, FILM COATED ORAL EVERY 6 HOURS PRN
Status: DISCONTINUED | OUTPATIENT
Start: 2024-11-25 | End: 2024-11-29 | Stop reason: HOSPADM

## 2024-11-25 RX ORDER — ONDANSETRON HYDROCHLORIDE 2 MG/ML
INJECTION, SOLUTION INTRAVENOUS
Status: COMPLETED
Start: 2024-11-25 | End: 2024-11-25

## 2024-11-25 RX ORDER — LATANOPROST 50 UG/ML
1 SOLUTION/ DROPS OPHTHALMIC NIGHTLY
Status: DISCONTINUED | OUTPATIENT
Start: 2024-11-25 | End: 2024-11-29 | Stop reason: HOSPADM

## 2024-11-25 RX ORDER — ROCURONIUM BROMIDE 10 MG/ML
INJECTION, SOLUTION INTRAVENOUS
Status: DISCONTINUED | OUTPATIENT
Start: 2024-11-25 | End: 2024-11-25

## 2024-11-25 RX ORDER — SODIUM CHLORIDE 0.9 % (FLUSH) 0.9 %
10 SYRINGE (ML) INJECTION
Status: DISCONTINUED | OUTPATIENT
Start: 2024-11-25 | End: 2024-11-29 | Stop reason: HOSPADM

## 2024-11-25 RX ORDER — PROPOFOL 10 MG/ML
VIAL (ML) INTRAVENOUS
Status: DISCONTINUED | OUTPATIENT
Start: 2024-11-25 | End: 2024-11-25

## 2024-11-25 RX ORDER — IBUPROFEN 200 MG
16 TABLET ORAL
Status: DISCONTINUED | OUTPATIENT
Start: 2024-11-25 | End: 2024-11-29 | Stop reason: HOSPADM

## 2024-11-25 RX ORDER — POTASSIUM CHLORIDE 20 MEQ/1
20 TABLET, EXTENDED RELEASE ORAL EVERY 4 HOURS
Status: DISCONTINUED | OUTPATIENT
Start: 2024-11-25 | End: 2024-11-25

## 2024-11-25 RX ORDER — CEFAZOLIN 2 G/1
2 INJECTION, POWDER, FOR SOLUTION INTRAMUSCULAR; INTRAVENOUS
Status: DISCONTINUED | OUTPATIENT
Start: 2024-11-25 | End: 2024-11-25 | Stop reason: HOSPADM

## 2024-11-25 RX ORDER — FENTANYL CITRATE 50 UG/ML
25 INJECTION, SOLUTION INTRAMUSCULAR; INTRAVENOUS EVERY 5 MIN PRN
Status: DISCONTINUED | OUTPATIENT
Start: 2024-11-25 | End: 2024-11-25 | Stop reason: HOSPADM

## 2024-11-25 RX ORDER — LIDOCAINE HYDROCHLORIDE 20 MG/ML
INJECTION INTRAVENOUS
Status: DISCONTINUED | OUTPATIENT
Start: 2024-11-25 | End: 2024-11-25

## 2024-11-25 RX ORDER — BACITRACIN ZINC 500 UNIT/G
OINTMENT (GRAM) TOPICAL
Status: DISCONTINUED | OUTPATIENT
Start: 2024-11-25 | End: 2024-11-25 | Stop reason: HOSPADM

## 2024-11-25 RX ORDER — DEXAMETHASONE SODIUM PHOSPHATE 4 MG/ML
INJECTION, SOLUTION INTRA-ARTICULAR; INTRALESIONAL; INTRAMUSCULAR; INTRAVENOUS; SOFT TISSUE
Status: DISCONTINUED | OUTPATIENT
Start: 2024-11-25 | End: 2024-11-25

## 2024-11-25 RX ORDER — ONDANSETRON HYDROCHLORIDE 2 MG/ML
4 INJECTION, SOLUTION INTRAVENOUS EVERY 6 HOURS PRN
Status: DISCONTINUED | OUTPATIENT
Start: 2024-11-25 | End: 2024-11-29 | Stop reason: HOSPADM

## 2024-11-25 RX ORDER — MONTELUKAST SODIUM 10 MG/1
10 TABLET ORAL NIGHTLY
Status: DISCONTINUED | OUTPATIENT
Start: 2024-11-25 | End: 2024-11-29 | Stop reason: HOSPADM

## 2024-11-25 RX ORDER — ACETAMINOPHEN 500 MG
1000 TABLET ORAL EVERY 6 HOURS
Status: COMPLETED | OUTPATIENT
Start: 2024-11-26 | End: 2024-11-27

## 2024-11-25 RX ORDER — GLUCAGON 1 MG
1 KIT INJECTION
Status: DISCONTINUED | OUTPATIENT
Start: 2024-11-25 | End: 2024-11-29 | Stop reason: HOSPADM

## 2024-11-25 RX ORDER — POTASSIUM CHLORIDE 20 MEQ/1
20 TABLET, EXTENDED RELEASE ORAL ONCE
Status: DISCONTINUED | OUTPATIENT
Start: 2024-11-25 | End: 2024-11-25

## 2024-11-25 RX ORDER — LANOLIN ALCOHOL/MO/W.PET/CERES
400 CREAM (GRAM) TOPICAL DAILY
Status: DISCONTINUED | OUTPATIENT
Start: 2024-11-26 | End: 2024-11-29 | Stop reason: HOSPADM

## 2024-11-25 RX ORDER — PHENYLEPHRINE HYDROCHLORIDE 10 MG/ML
INJECTION INTRAVENOUS
Status: DISCONTINUED | OUTPATIENT
Start: 2024-11-25 | End: 2024-11-25

## 2024-11-25 RX ORDER — MUPIROCIN 20 MG/G
OINTMENT TOPICAL
Status: DISCONTINUED | OUTPATIENT
Start: 2024-11-25 | End: 2024-11-25 | Stop reason: HOSPADM

## 2024-11-25 RX ORDER — MORPHINE SULFATE 2 MG/ML
2 INJECTION, SOLUTION INTRAMUSCULAR; INTRAVENOUS
Status: DISCONTINUED | OUTPATIENT
Start: 2024-11-25 | End: 2024-11-25 | Stop reason: HOSPADM

## 2024-11-25 RX ORDER — ONDANSETRON HYDROCHLORIDE 2 MG/ML
4 INJECTION, SOLUTION INTRAVENOUS
Status: COMPLETED | OUTPATIENT
Start: 2024-11-25 | End: 2024-11-25

## 2024-11-25 RX ORDER — ONDANSETRON HYDROCHLORIDE 2 MG/ML
INJECTION, SOLUTION INTRAVENOUS
Status: DISCONTINUED | OUTPATIENT
Start: 2024-11-25 | End: 2024-11-25

## 2024-11-25 RX ORDER — CEFAZOLIN SODIUM 1 G/3ML
INJECTION, POWDER, FOR SOLUTION INTRAMUSCULAR; INTRAVENOUS
Status: DISCONTINUED | OUTPATIENT
Start: 2024-11-25 | End: 2024-11-25

## 2024-11-25 RX ORDER — TRANEXAMIC ACID 100 MG/ML
INJECTION, SOLUTION INTRAVENOUS
Status: DISCONTINUED | OUTPATIENT
Start: 2024-11-25 | End: 2024-11-25

## 2024-11-25 RX ORDER — LOSARTAN POTASSIUM 50 MG/1
100 TABLET ORAL DAILY
Status: DISCONTINUED | OUTPATIENT
Start: 2024-11-25 | End: 2024-11-26

## 2024-11-25 RX ORDER — FENTANYL CITRATE 50 UG/ML
25-200 INJECTION, SOLUTION INTRAMUSCULAR; INTRAVENOUS
Status: DISCONTINUED | OUTPATIENT
Start: 2024-11-25 | End: 2024-11-25 | Stop reason: HOSPADM

## 2024-11-25 RX ORDER — POLYETHYLENE GLYCOL 3350 17 G/17G
17 POWDER, FOR SOLUTION ORAL DAILY
Status: DISCONTINUED | OUTPATIENT
Start: 2024-11-26 | End: 2024-11-29 | Stop reason: HOSPADM

## 2024-11-25 RX ORDER — INSULIN ASPART 100 [IU]/ML
0-10 INJECTION, SOLUTION INTRAVENOUS; SUBCUTANEOUS
Status: DISCONTINUED | OUTPATIENT
Start: 2024-11-25 | End: 2024-11-29 | Stop reason: HOSPADM

## 2024-11-25 RX ORDER — LIDOCAINE HYDROCHLORIDE 10 MG/ML
1 INJECTION, SOLUTION EPIDURAL; INFILTRATION; INTRACAUDAL; PERINEURAL
Status: DISCONTINUED | OUTPATIENT
Start: 2024-11-25 | End: 2024-11-25 | Stop reason: HOSPADM

## 2024-11-25 RX ORDER — FENTANYL CITRATE 50 UG/ML
INJECTION, SOLUTION INTRAMUSCULAR; INTRAVENOUS
Status: DISCONTINUED | OUTPATIENT
Start: 2024-11-25 | End: 2024-11-25

## 2024-11-25 RX ORDER — PROCHLORPERAZINE EDISYLATE 5 MG/ML
5 INJECTION INTRAMUSCULAR; INTRAVENOUS EVERY 30 MIN PRN
Status: DISCONTINUED | OUTPATIENT
Start: 2024-11-25 | End: 2024-11-25 | Stop reason: HOSPADM

## 2024-11-25 RX ORDER — DROPERIDOL 2.5 MG/ML
0.62 INJECTION, SOLUTION INTRAMUSCULAR; INTRAVENOUS
Status: ACTIVE | OUTPATIENT
Start: 2024-11-25 | End: 2024-11-25

## 2024-11-25 RX ORDER — ACETAMINOPHEN 500 MG
1000 TABLET ORAL EVERY 8 HOURS
Status: DISCONTINUED | OUTPATIENT
Start: 2024-11-25 | End: 2024-11-25

## 2024-11-25 RX ORDER — AMOXICILLIN 250 MG
1 CAPSULE ORAL 2 TIMES DAILY
Status: DISCONTINUED | OUTPATIENT
Start: 2024-11-25 | End: 2024-11-29 | Stop reason: HOSPADM

## 2024-11-25 RX ORDER — MUPIROCIN 20 MG/G
1 OINTMENT TOPICAL 2 TIMES DAILY
Status: CANCELLED | OUTPATIENT
Start: 2024-11-25 | End: 2024-11-30

## 2024-11-25 RX ORDER — SODIUM CHLORIDE 0.9 % (FLUSH) 0.9 %
10 SYRINGE (ML) INJECTION
Status: DISCONTINUED | OUTPATIENT
Start: 2024-11-25 | End: 2024-11-25 | Stop reason: HOSPADM

## 2024-11-25 RX ORDER — TALC
6 POWDER (GRAM) TOPICAL NIGHTLY PRN
Status: DISCONTINUED | OUTPATIENT
Start: 2024-11-25 | End: 2024-11-29 | Stop reason: HOSPADM

## 2024-11-25 RX ADMIN — HYDROMORPHONE HYDROCHLORIDE 0.2 MG: 1 INJECTION, SOLUTION INTRAMUSCULAR; INTRAVENOUS; SUBCUTANEOUS at 08:11

## 2024-11-25 RX ADMIN — VANCOMYCIN HYDROCHLORIDE 1000 MG: 1 INJECTION, POWDER, LYOPHILIZED, FOR SOLUTION INTRAVENOUS at 04:11

## 2024-11-25 RX ADMIN — EPHEDRINE SULFATE 5 MG: 50 INJECTION, SOLUTION INTRAVENOUS at 06:11

## 2024-11-25 RX ADMIN — MONTELUKAST 10 MG: 10 TABLET, FILM COATED ORAL at 10:11

## 2024-11-25 RX ADMIN — MUPIROCIN 1 G: 20 OINTMENT TOPICAL at 04:11

## 2024-11-25 RX ADMIN — ROCURONIUM BROMIDE 30 MG: 10 INJECTION, SOLUTION INTRAVENOUS at 05:11

## 2024-11-25 RX ADMIN — ROCURONIUM BROMIDE 10 MG: 10 INJECTION, SOLUTION INTRAVENOUS at 06:11

## 2024-11-25 RX ADMIN — MORPHINE SULFATE 2 MG: 2 INJECTION, SOLUTION INTRAMUSCULAR; INTRAVENOUS at 12:11

## 2024-11-25 RX ADMIN — PROCHLORPERAZINE EDISYLATE 2.5 MG: 5 INJECTION INTRAMUSCULAR; INTRAVENOUS at 11:11

## 2024-11-25 RX ADMIN — HYDROMORPHONE HYDROCHLORIDE 0.2 MG: 1 INJECTION, SOLUTION INTRAMUSCULAR; INTRAVENOUS; SUBCUTANEOUS at 07:11

## 2024-11-25 RX ADMIN — PROPOFOL 150 MG: 10 INJECTION, EMULSION INTRAVENOUS at 05:11

## 2024-11-25 RX ADMIN — LOSARTAN POTASSIUM 100 MG: 50 TABLET, FILM COATED ORAL at 12:11

## 2024-11-25 RX ADMIN — ACETAMINOPHEN 1000 MG: 500 TABLET ORAL at 01:11

## 2024-11-25 RX ADMIN — INSULIN ASPART 4 UNITS: 100 INJECTION, SOLUTION INTRAVENOUS; SUBCUTANEOUS at 07:11

## 2024-11-25 RX ADMIN — LATANOPROST 1 DROP: 50 SOLUTION OPHTHALMIC at 10:11

## 2024-11-25 RX ADMIN — MORPHINE SULFATE 2 MG: 2 INJECTION, SOLUTION INTRAMUSCULAR; INTRAVENOUS at 07:11

## 2024-11-25 RX ADMIN — SUGAMMADEX 200 MG: 100 INJECTION, SOLUTION INTRAVENOUS at 07:11

## 2024-11-25 RX ADMIN — LIDOCAINE HYDROCHLORIDE 100 MG: 10 SOLUTION INTRAVENOUS at 08:11

## 2024-11-25 RX ADMIN — FENTANYL CITRATE 25 MCG: 50 INJECTION, SOLUTION INTRAMUSCULAR; INTRAVENOUS at 05:11

## 2024-11-25 RX ADMIN — SODIUM CHLORIDE: 0.9 INJECTION, SOLUTION INTRAVENOUS at 05:11

## 2024-11-25 RX ADMIN — ATORVASTATIN CALCIUM 20 MG: 20 TABLET, FILM COATED ORAL at 08:11

## 2024-11-25 RX ADMIN — EPHEDRINE SULFATE 10 MG: 50 INJECTION, SOLUTION INTRAVENOUS at 05:11

## 2024-11-25 RX ADMIN — EPHEDRINE SULFATE 10 MG: 50 INJECTION, SOLUTION INTRAVENOUS at 06:11

## 2024-11-25 RX ADMIN — OXYCODONE 5 MG: 5 TABLET ORAL at 07:11

## 2024-11-25 RX ADMIN — ACETAMINOPHEN 1000 MG: 500 TABLET ORAL at 11:11

## 2024-11-25 RX ADMIN — SODIUM CHLORIDE: 0.9 INJECTION, SOLUTION INTRAVENOUS at 07:11

## 2024-11-25 RX ADMIN — FENTANYL CITRATE 25 MCG: 50 INJECTION, SOLUTION INTRAMUSCULAR; INTRAVENOUS at 06:11

## 2024-11-25 RX ADMIN — BACITRACIN 1 EACH: 500 OINTMENT TOPICAL at 10:11

## 2024-11-25 RX ADMIN — SENNOSIDES AND DOCUSATE SODIUM 1 TABLET: 50; 8.6 TABLET ORAL at 08:11

## 2024-11-25 RX ADMIN — ONDANSETRON HYDROCHLORIDE 4 MG: 2 INJECTION, SOLUTION INTRAVENOUS at 07:11

## 2024-11-25 RX ADMIN — ONDANSETRON 4 MG: 2 INJECTION INTRAMUSCULAR; INTRAVENOUS at 06:11

## 2024-11-25 RX ADMIN — APIXABAN 2.5 MG: 2.5 TABLET, FILM COATED ORAL at 10:11

## 2024-11-25 RX ADMIN — PHENYLEPHRINE HYDROCHLORIDE 100 MCG: 10 INJECTION INTRAVENOUS at 06:11

## 2024-11-25 RX ADMIN — ONDANSETRON 4 MG: 2 INJECTION INTRAMUSCULAR; INTRAVENOUS at 05:11

## 2024-11-25 RX ADMIN — CEFAZOLIN 2 G: 330 INJECTION, POWDER, FOR SOLUTION INTRAMUSCULAR; INTRAVENOUS at 05:11

## 2024-11-25 RX ADMIN — POTASSIUM CHLORIDE 20 MEQ: 1500 TABLET, EXTENDED RELEASE ORAL at 01:11

## 2024-11-25 RX ADMIN — TRANEXAMIC ACID 1000 MG: 100 INJECTION INTRAVENOUS at 05:11

## 2024-11-25 RX ADMIN — PROCHLORPERAZINE EDISYLATE 5 MG: 5 INJECTION INTRAMUSCULAR; INTRAVENOUS at 09:11

## 2024-11-25 RX ADMIN — ROCURONIUM BROMIDE 20 MG: 10 INJECTION, SOLUTION INTRAVENOUS at 05:11

## 2024-11-25 RX ADMIN — ONDANSETRON 4 MG: 2 INJECTION INTRAMUSCULAR; INTRAVENOUS at 07:11

## 2024-11-25 RX ADMIN — ONDANSETRON 4 MG: 2 INJECTION INTRAMUSCULAR; INTRAVENOUS at 08:11

## 2024-11-25 RX ADMIN — EPHEDRINE SULFATE 25 MG: 50 INJECTION INTRAVENOUS at 05:11

## 2024-11-25 RX ADMIN — INSULIN GLARGINE 5 UNITS: 100 INJECTION, SOLUTION SUBCUTANEOUS at 08:11

## 2024-11-25 RX ADMIN — METHOCARBAMOL 500 MG: 500 TABLET ORAL at 07:11

## 2024-11-25 RX ADMIN — DEXAMETHASONE SODIUM PHOSPHATE 4 MG: 4 INJECTION, SOLUTION INTRAMUSCULAR; INTRAVENOUS at 05:11

## 2024-11-25 RX ADMIN — HYDRALAZINE HYDROCHLORIDE 50 MG: 50 TABLET ORAL at 01:11

## 2024-11-25 RX ADMIN — LIDOCAINE HYDROCHLORIDE 80 MG: 20 INJECTION INTRAVENOUS at 05:11

## 2024-11-25 RX ADMIN — PANCRELIPASE 2 CAPSULE: 120000; 24000; 76000 CAPSULE, DELAYED RELEASE PELLETS ORAL at 01:11

## 2024-11-25 NOTE — ASSESSMENT & PLAN NOTE
Creatine stable for now. BMP reviewed- noted Estimated Creatinine Clearance: 32.5 mL/min (based on SCr of 1 mg/dL). according to latest data. 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  Monitor UOP and serial BMP and adjust therapy as needed. Renally dose meds. Avoid nephrotoxic medications and procedures.

## 2024-11-25 NOTE — CONSULTS
[facility]  Orthopedics  Consult Note    Patient Name: Sussy Costa  MRN: [id number]  Admission Date: 11/25/2024  Hospital Length of Stay: 0 days  Attending Provider: Glenny Layton MD  Primary Care Provider: Leon Ramírez MD        Inpatient consult to Orthopedic Surgery  Consult performed by: LORIE Jo MD  Consult ordered by: Roberto Flores MD        Subjective:     Principal Problem:Closed intertrochanteric fracture of right femur    Chief Complaint:   Chief Complaint   Patient presents with    Fall     Fall. Per ems pt fell walking to restroom. Obvious shortening to right leg with outward rotation        HPI: Sussy Costa is a 89 y.o. female with PMH of PMH of L IT fx s/p CMN on 3/31 with Dr. Walker, adenocarcinoma of the right colon s/p right colectomy with Dr. Holman on 10/25/22, asthma, T2DM (A1c 5.9), HLD, HTN, CKD stage 3, pancreatic insufficiency, Asthma, Afib and osteoporosis on Prolia presenting with right hip pain. Patient was walking to the bathroom this morning and fell onto right hip. Immediate pain and difficulty bearing weight. Patient is unsure if she hit her head. She thinks she lost consciousness which caused her to fall.  states she was unresponsive for the first 30 seconds after the fall.  She is not currently on any blood thinners. She did hit her right forearm on the dresser while falling. She has a laceration present over dorsal distal forearm that was closed by the ED. Denies numbness, tingling, or paresthesias to the RLE or RUE. Lives with her . Uses a cane while ambulating outside of the house. Community ambulator, able to walk around grocery store.       Past Medical History:   Diagnosis Date    Asthma     Cyst of pancreas     liver    Diabetes mellitus type II     Eczema of hand     Glaucoma     History of colon cancer 10/28/2022    Right sided colon cancer diagnosed in setting of LBO requiring urgent hemicolectomy 10/25/2022 with path showing  pT4aN0 disease. CT chest 12/7/22 with possible lung metastases and MR liver with indeterminate liver lesions. 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 Patient elected for surveillance.       History of recurrent pneumonia 09/28/2020    Hyperlipidemia     Hypertension     Lichen planus     gums    Osteoporosis     Other chronic pancreatitis 03/01/2023    Pancreas cyst 03/18/2016    Pulmonary nodules     Spinal stenosis of lumbar region 08/30/2018       Past Surgical History:   Procedure Laterality Date    BRONCHOSCOPY N/A 05/13/2022    Procedure: Bronchoscopy;  Surgeon: [facility];  Location: [facility] OR [address] [place] [address];  Service: Anesthesiology;  Laterality: N/A;    CATARACT EXTRACTION, BILATERAL      CHOLECYSTECTOMY      COLECTOMY, RIGHT Right 10/25/2022    Procedure: COLECTOMY, RIGHT;  Surgeon: Jass Holman MD;  [facility];  Service: Colon and Rectal;  Laterality: Right;    COLONOSCOPY N/A 06/26/2023    Procedure: COLONOSCOPY;  Surgeon: Jass Holman MD;  [facility]  Service: Endoscopy;  Laterality: N/A;  2nd floor -lung disease  referral Dr [id number] portal/mailed-GT  6/20/23- Precall confirmed- [place]    ENDOSCOPIC ULTRASOUND OF UPPER GASTROINTESTINAL TRACT N/A 04/22/2022    Procedure: ULTRASOUND, UPPER GI TRACT, ENDOSCOPIC;  Surgeon: Max Rosado MD;  Location: [facility] ENDO ([address] [place] [address]);  Service: Endoscopy;  Laterality: N/A;  urgent EUS (linear) for abnormal CT scan with dilated PD and increased cyst of pancreas cyst.  pap 44 mmHg  4/13:fully vaccinated. instructions via portal-SC    EPIDURAL STEROID INJECTION INTO LUMBAR SPINE N/A 11/08/2018    Procedure: Injection-steroid-epidural-lumbar L5-S1;  Surgeon: Nicci Osorio Jr., MD;  Location: [facility] PAIN MGT;  Service: Pain Management;  Laterality: N/A;    FUNCTIONAL ENDOSCOPIC SINUS SURGERY (FESS) USING  COMPUTER-ASSISTED NAVIGATION N/A 06/17/2019    Procedure: FESS, USING COMPUTER-ASSISTED NAVIGATION;  Surgeon: Rosangela Isabel MD;  Location: [facility];  Service: ENT;  Laterality: N/A;  video    HIP SURGERY Left 04/2024    HYSTERECTOMY      INTRAMEDULLARY RODDING OF FEMUR Left 03/31/2024    Procedure: INSERTION, INTRAMEDULLARY CAYDEN, FEMUR - Left,;  Surgeon: Nakul Walker MD;  Location: [address];  Service: Orthopedics;  Laterality: Left;    nasal polyps         Review of patient's allergies indicates:   Allergen Reactions    Aspirin Other (See Comments)     Asthma    TRIAD OF NASAL POLYPS, ASA  ALLERGY AND ASTHMA.        Aspirin Other (See Comments)    Nsaids (non-steroidal anti-inflammatory drug) Other (See Comments)     AVOID DUE TO TRIAD OF ASA ALLERGY, NASAL POLYPS,AND ASTHMA  AVOID DUE TO TRIAD OF ASA ALLERGY, NASAL POLYPS,AND ASTHMA    Penicillin      Other reaction(s): Rash    9/30/20: tolerates ceftriaxone    Penicillin g Other (See Comments)     Other reaction(s): Rash    9/30/20: tolerates ceftriaxone       Current Facility-Administered Medications   Medication    acetaminophen tablet 1,000 mg    artificial tears 0.5 % ophthalmic solution 1 drop    atorvastatin tablet 20 mg    bisacodyL suppository 10 mg    dextrose 10% bolus 125 mL 125 mL    dextrose 10% bolus 250 mL 250 mL    droPERidol injection 0.625 mg    glucagon (human recombinant) injection 1 mg    glucose chewable tablet 16 g    glucose chewable tablet 24 g    hydrALAZINE tablet 50 mg    insulin aspart U-100 pen 0-10 Units    latanoprost 0.005 % ophthalmic solution 1 drop    lipase-protease-amylase 24,000-76,000-120,000 units capsule 2 capsule    losartan tablet 100 mg    [START ON 11/26/2024] magnesium oxide tablet 400 mg    melatonin tablet 6 mg    methocarbamoL tablet 500 mg    montelukast tablet 10 mg    morphine injection 2 mg    morphine injection 2 mg    ondansetron injection 4 mg    oxyCODONE immediate release tablet 5 mg  "   polyethylene glycol packet 17 g    potassium chloride SA CR tablet 20 mEq    prochlorperazine injection Soln 2.5 mg    sodium chloride 0.9% flush 10 mL    [START ON 11/26/2024] vitamin D 1000 units tablet 1,000 Units     Family History       Problem Relation (Age of Onset)    Heart disease Father, Brother    Hypertension Brother          Tobacco Use    Smoking status: Never     Passive exposure: Never    Smokeless tobacco: Never   Substance and Sexual Activity    Alcohol use: Yes     Comment: socially    Drug use: No    Sexual activity: Yes     Partners: Male     Birth control/protection: Post-menopausal     ROS  Constitutional: negative for fevers  Eyes: negative visual changes  ENT: negative for hearing loss  Respiratory: negative for dyspnea  Cardiovascular: negative for chest pain  Gastrointestinal: negative for abdominal pain  Genitourinary: negative for dysuria  Neurological: negative for headaches  Behavioral/Psych: negative for hallucinations  Endocrine: negative for temperature intolerance    Objective:     Vital Signs (Most Recent):  Temp: 97.9 °F (36.6 °C) (11/25/24 1238)  Pulse: (!) 59 (11/25/24 1358)  Resp: 16 (11/25/24 1358)  BP: (!) 183/77 (11/25/24 1358)  SpO2: 97 % (11/25/24 1358) Vital Signs (24h Range):  Temp:  [97.7 °F (36.5 °C)-97.9 °F (36.6 °C)] 97.9 °F (36.6 °C)  Pulse:  [55-95] 59  Resp:  [16-22] 16  SpO2:  [97 %-100 %] 97 %  BP: (153-228)/() 183/77     Weight: 53 kg (116 lb 13.5 oz)  Height: 5' 5" (165.1 cm)  Body mass index is 19.44 kg/m².      Intake/Output Summary (Last 24 hours) at 11/25/2024 1419  Last data filed at 11/25/2024 1100  Gross per 24 hour   Intake --   Output 450 ml   Net -450 ml        Ortho/SPM Exam  General:  no acute distress, appears stated age   Neuro: alert and oriented x3  Psych: normal mood  Head: normocephalic, atraumatic.  Eyes: no scleral icterus  Mouth: moist mucous membranes  Cardiovascular: extremities warm and well perfused  Lungs: breathing " comfortably, equal chest rise bilat  Skin: clean, dry, intact (any exceptions noted in below musculoskeletal exam)    MSK:  RUE:  - 2 skin tears and one laceration present over dorsal forearm, laceration closed with prolene sutures  - No swelling  - Scattered ecchymosis  - NonTTP throughout  - AROM and PROM of the shoulder, elbow, wrist, and hand intact without pain  - Axillary/AIN/PIN/Radial/Median/Ulnar Nerves assessed in isolation without deficit  - SILT throughout  - Compartments soft  - Radial artery palpated   - Capillary Refill <3s    LUE:  - Skin intact throughout, no open wounds  - No swelling  - No ecchymosis, erythema, or signs of cellulitis  - NonTTP throughout  - AROM and PROM of the shoulder, elbow, wrist, and hand intact without pain  - Axillary/AIN/PIN/Radial/Median/Ulnar Nerves assessed in isolation without deficit  - SILT throughout  - Compartments soft  - Radial artery palpated   - Capillary Refill <3s    RLE:  - Skin intact throughout, no scars present  - No swelling  - No ecchymosis, erythema, or signs of cellulitis  - TTP at hip/proximal femur  - No tenderness to palpation of middle or distal femur  - No tenderness to palpation of proximal, middle, or distal aspects of tibia or fibula  - No tenderness to palpation of foot  - Pain with any ROM at hip  - Full painless ROM of knee and ankle  - Compartments soft  - SILT Sa/Stephens/DP/SP/T  - Motor intact EHL/FHL/TA/Gastroc  - 2+ DP  - Brisk capillary refill       LLE:  - Skin intact throughout, no open wounds  - Previous incisions well healed  - No swelling  - No ecchymosis, erythema, or signs of cellulitis  - Mild TTP over lateral malleolus  - AROM and PROM of the hip, knee, ankle, and foot intact without pain  - TA/EHL/Gastroc/FHL assessed in isolation without deficit  - SILT throughout  - Compartments soft  - DP palpated  - Capillary Refill <3s  - Negative Log roll  - Negative Stinchfield    Spine/pelvis/axial body:  No C spine tenderness  No pain  with compression of pelvis  No chest wall or abdominal tenderness         Significant Labs: A1C:   Recent Labs   Lab 09/16/24  0945 11/25/24  1032   HGBA1C 5.7* 5.9*     CBC:   Recent Labs   Lab 11/25/24  0722   WBC 18.08*   HGB 11.2*   HCT 35.5*        CMP:   Recent Labs   Lab 11/25/24  0722      K 3.3*   *   CO2 20*   *   BUN 24*   CREATININE 1.0   CALCIUM 9.5   PROT 6.5   ALBUMIN 3.6   BILITOT 0.5   ALKPHOS 61   AST 29   ALT 29   ANIONGAP 9     Coagulation:   Recent Labs   Lab 11/25/24  0744   LABPROT 12.3   INR 1.1   APTT 25.2     Prealbumin:   Recent Labs   Lab 11/25/24  1032   PREALBUMIN 12*     Urine Studies:   Recent Labs   Lab 11/25/24  1101   COLORU Colorless*   APPEARANCEUA Clear   PHUR 6.0   SPECGRAV 1.010   PROTEINUA Negative   GLUCUA 4+*   KETONESU Negative   BILIRUBINUA Negative   OCCULTUA Negative   NITRITE Negative   LEUKOCYTESUR Negative   RBCUA 0   WBCUA 0   BACTERIA Rare   SQUAMEPITHEL 0     All pertinent labs within the past 24 hours have been reviewed.    Significant Imaging: I have reviewed and interpreted all pertinent imaging results/findings.  XR R hip: Comminuted and displaced intertrochanteric femur fracture  Assessment/Plan:     * Closed intertrochanteric fracture of right femur  Sussy Costa is a 89 y.o. female with right IT femur fracture, closed, NVI. They take no anticoagulation at home. They required a cane prior to this injury.     -Admitted to medicine, RCRI 1  -To OR today for operative fixation of R IT fracture  -Pt marked, booked, and consented for surgery  -DVT PPx: Hold anticoagulation  -Abx: Preop abx ordered  -Labs: Prealbumin 12, UA neg, Glucose 161, Albumin 3.6, Hemoglobin 11.2, Hematocrit 35.5, Platelets 216, White blood cell count 18.08, A1c 5.9, INR 1.1. Other lab results pending.  -Bed rest, ca, NPO at midnight  -Iv: ordered for contralateral arm    Patient was explained in detail the severity of the injury that was suffered. Patient  was explained the risks/benefits/and alternatives to operative management in detail including infection, bleeding, pain, nerve and vascular damage, heterotopic ossification, leg length discrepancies, rotational deformities and they express full understanding.  We discussed the 30% national first year mortality rate with hip fractures, the need for early mobilization, and the expected rehab course.  She expresses full understanding of the condition and expresses that they want to proceed with surgery. The patient is admitted to the medicine hip fracture service for optimization of medical comorbidities. Will plan for OR today. No guarantees were made, informed consent was obtained. All questions were answered to patient's and family's satisfaction.               LORIE Jo MD  Orthopedics  [facility] [place] - Surgery

## 2024-11-25 NOTE — ANESTHESIA PREPROCEDURE EVALUATION
[facility]  Anesthesia Pre-Operative Evaluation         Patient Name/: Sussy Costa, 1935  MRN: [id number]    SUBJECTIVE:     Pre-operative evaluation for Procedure(s) (LRB):  INSERTION, INTRAMEDULLARY CAYDEN, FEMUR (Right)     2024    Sussy Costa is a 89 y.o. female     Patient now presents for the above procedure(s).    ________________________________________  Results for orders placed during the hospital encounter of 22    Echo    Interpretation Summary  · The left ventricle is normal in size with concentric remodeling and normal systolic function.  · The estimated ejection fraction is 60%.  · Indeterminate left ventricular diastolic function.  · Normal right ventricular size with normal right ventricular systolic function.  · There is mild mitral stenosis. Sclerosis and thickening extends in to the aortomitral curtain.  · The mean diastolic gradient across the mitral valve is 4 mmHg at a heart rate of 87 bpm.  · Normal central venous pressure (3 mmHg).  · The estimated PA systolic pressure is 26 mmHg.  · Small posterior pericardial effusion.    ________________________________________    LDA:        Peripheral IV - Single Lumen 24 0634 20 G Left Antecubital (Active)   Number of days: 0            Urethral Catheter 24 1147 16 Fr. (Active)   Number of days: 0       Drips:       Patient Active Problem List   Diagnosis    Primary hypertension    Diabetes mellitus type II    Hyperlipidemia associated with type 2 diabetes mellitus    Glaucoma    OA (osteoarthritis)    Pancreatic cyst    Senile osteoporosis    Spinal stenosis of lumbar region    Anterolisthesis    Lumbar spondylosis    Diastolic dysfunction    Asthma, moderate persistent    Hypophosphatemia    History of colon cancer    Impaired mobility    Type 2 diabetes mellitus with microalbuminuria, without long-term current use of insulin    Pancreatic insufficiency    Carcinoid bronchial adenoma, right     Other chronic pancreatitis    Paroxysmal atrial fibrillation    Aortic atherosclerosis    Abdominal pain    Stage 3 chronic kidney disease    Balance problem    Closed 2-part intertrochanteric fracture of left femur    Right hip pain    Hypomagnesemia    Acute blood loss anemia    Vitamin D insufficiency    Constipation    Body mass index (BMI) less than 19    Thrombocytopenia    Skin tear of hand without complication, left, initial encounter    Leukocytosis    Hypokalemia    Closed intertrochanteric fracture of right femur    Abnormal computed tomography of cervical spine    Osteoporosis with current pathological fracture    Normocytic anemia    Syncope    Laceration of right forearm       Review of patient's allergies indicates:   Allergen Reactions    Aspirin Other (See Comments)     Asthma    TRIAD OF NASAL POLYPS, ASA  ALLERGY AND ASTHMA.        Aspirin Other (See Comments)    Nsaids (non-steroidal anti-inflammatory drug) Other (See Comments)     AVOID DUE TO TRIAD OF ASA ALLERGY, NASAL POLYPS,AND ASTHMA  AVOID DUE TO TRIAD OF ASA ALLERGY, NASAL POLYPS,AND ASTHMA    Penicillin      Other reaction(s): Rash    9/30/20: tolerates ceftriaxone    Penicillin g Other (See Comments)     Other reaction(s): Rash    9/30/20: tolerates ceftriaxone       Current Inpatient Medications:    acetaminophen  1,000 mg Oral Q8H    atorvastatin  20 mg Oral QHS    droPERidol  0.625 mg Intravenous ED 1 Time    latanoprost  1 drop Both Eyes QHS    lipase-protease-amylase 24,000-76,000-120,000 units  2 capsule Oral TID WM    losartan  100 mg Oral Daily    [START ON 11/26/2024] magnesium oxide  400 mg Oral Daily    montelukast  10 mg Oral QHS    morphine  2 mg Intravenous ED 1 Time    polyethylene glycol  17 g Oral Daily    potassium chloride  20 mEq Oral Q4H    [START ON 11/26/2024] vitamin D  1,000 Units Oral Daily       No current facility-administered medications on file prior to encounter.     Current Outpatient Medications on File  Prior to Encounter   Medication Sig Dispense Refill    acetaminophen (TYLENOL) 500 MG tablet Take 2 tablets (1,000 mg total) by mouth every 8 (eight) hours. (Patient taking differently: Take 1,000 mg by mouth every 8 (eight) hours as needed for Pain.)  0    ascorbic acid, vitamin C, (VITAMIN C) 500 MG tablet Take 500 mg by mouth once daily.      atorvastatin (LIPITOR) 20 MG tablet Take 1 tablet (20 mg total) by mouth every evening. 90 tablet 0    calcium carbonate (TUMS ORAL) Take 2 tablets by mouth daily as needed (Heartburn).      denosumab (PROLIA) 60 mg/mL Syrg Inject 1 mL (60 mg total) into the skin every 6 (six) months. Hold at rehab (Patient taking differently: Inject 60 mg into the skin every 6 (six) months.)      empagliflozin (JARDIANCE) 10 mg tablet Take 1 tablet (10 mg total) by mouth once daily. 30 tablet 11    ferrous sulfate (IRON ORAL) Take 1 tablet by mouth every other day.      fluticasone-umeclidin-vilanter (TRELEGY ELLIPTA) 200-62.5-25 mcg inhaler Inhale 1 puff into the lungs once daily. 90 each 3    insulin aspart U-100 (NOVOLOG FLEXPEN U-100 INSULIN) 100 unit/mL (3 mL) InPn pen Inject 5 Units into the skin 3 (three) times daily with meals. (Patient taking differently: Inject 3 units into skin every morning, 5 units at lunch & 4 units at supper.) 13.5 mL 3    insulin glargine U-100, Lantus, (LANTUS SOLOSTAR U-100 INSULIN) 100 unit/mL (3 mL) InPn pen Inject 6 Units into the skin once daily AND 5 Units every evening. (Patient taking differently: Inject 5 Units into the skin once daily AND 5 Units every evening.) 10 mL 3    latanoprost 0.005 % ophthalmic solution Place 1 drop into both eyes every evening.      lipase-protease-amylase 24,000-76,000-120,000 units (CREON) 24,000-76,000 -120,000 unit capsule TAKE 2 CAPSULES WITH MEALS AND 1 CAPSULE WITH SNACKS. 200 capsule 8    losartan (COZAAR) 100 MG tablet Take 1 tablet (100 mg total) by mouth once daily. 90 tablet 3    magnesium oxide (MAG-OX) 400  "mg (241.3 mg magnesium) tablet Take 400 mg by mouth 2 (two) times daily.      montelukast (SINGULAIR) 10 mg tablet Take 1 tablet (10 mg total) by mouth every evening. 90 tablet 1    olopatadine (PATANOL) 0.1 % ophthalmic solution Place 1 drop into both eyes 2 (two) times daily as needed for Allergies. 1 Drops Ophthalmic Twice a day      senna-docusate 8.6-50 mg (PERICOLACE) 8.6-50 mg per tablet Take 1 tablet by mouth 2 (two) times daily. (Patient taking differently: Take 1 tablet by mouth every other day.)      UNABLE TO FIND BUDESONIDE 0.8 MG CAPS - Empty contents of 1 capsule into nasal irrigation system, add distilled water and salt pack then mix & irrigate once daily.      vitamin D (VITAMIN D3) 1000 units Tab Take 1,000 Units by mouth once daily.      ACCU-CHEK GUIDE TEST STRIPS Strp TEST FOUR TIMES DAILY WITH MEALS AND NIGHTLY      BD ULTRA-FINE SHORT PEN NEEDLE 31 gauge x 5/16" Ndle USE with insulin pen 5 TIMES A DAY      lancets (ACCU-CHEK SOFTCLIX LANCETS) Misc 1 each by Misc.(Non-Drug; Combo Route) route 4 (four) times daily. 400 each 3    [DISCONTINUED] risedronate (ACTONEL) 35 MG tablet Take 1 tablet (35 mg total) by mouth every 7 days. 12 tablet 3       Past Surgical History:   Procedure Laterality Date    BRONCHOSCOPY N/A 05/13/2022    Procedure: Bronchoscopy;  Surgeon: [facility] Provider;  Location: [facility] OR [address] [place] [address];  Service: Anesthesiology;  Laterality: N/A;    CATARACT EXTRACTION, BILATERAL      CHOLECYSTECTOMY      COLECTOMY, RIGHT Right 10/25/2022    Procedure: COLECTOMY, RIGHT;  Surgeon: Jass Holman MD;  Location: [facility] OR [address] [place] [address];  Service: Colon and Rectal;  Laterality: Right;    COLONOSCOPY N/A 06/26/2023    Procedure: COLONOSCOPY;  Surgeon: Jass Holman MD;  Location: [facility] ([address] [place] [address]);  Service: Endoscopy;  Laterality: N/A;  2nd floor -lung disease  referral Dr [id number] portal/mailed-GT  6/20/23- Precall confirmed- [place]    ENDOSCOPIC ULTRASOUND OF UPPER " GASTROINTESTINAL TRACT N/A 04/22/2022    Procedure: ULTRASOUND, UPPER GI TRACT, ENDOSCOPIC;  Surgeon: Max Rosado MD;  Location: [facility] ENDO (2ND FLR);  Service: Endoscopy;  Laterality: N/A;  urgent EUS (linear) for abnormal CT scan with dilated PD and increased cyst of pancreas cyst.  pap 44 mmHg  4/13:fully vaccinated. instructions via portal-SC    EPIDURAL STEROID INJECTION INTO LUMBAR SPINE N/A 11/08/2018    Procedure: Injection-steroid-epidural-lumbar L5-S1;  Surgeon: Nicci Osorio Jr., MD;  Location: [facility] PAIN MGT;  Service: Pain Management;  Laterality: N/A;    FUNCTIONAL ENDOSCOPIC SINUS SURGERY (FESS) USING COMPUTER-ASSISTED NAVIGATION N/A 06/17/2019    Procedure: FESS, USING COMPUTER-ASSISTED NAVIGATION;  Surgeon: Rosangela Isabel MD;  Location: [facility] OR;  Service: ENT;  Laterality: N/A;  video    HIP SURGERY Left 04/2024    HYSTERECTOMY      INTRAMEDULLARY RODDING OF FEMUR Left 03/31/2024    Procedure: INSERTION, INTRAMEDULLARY CAYDEN, FEMUR - Left,;  Surgeon: Nakul Walker MD;  Location: [facility] OR 2ND FLR;  Service: Orthopedics;  Laterality: Left;    nasal polyps         Social History:  Tobacco Use: Low Risk  (11/25/2024)    Patient History     Smoking Tobacco Use: Never     Smokeless Tobacco Use: Never     Passive Exposure: Never       Alcohol Use: Not At Risk (11/25/2024)    AUDIT-C     Frequency of Alcohol Consumption: Never     Average Number of Drinks: Patient does not drink     Frequency of Binge Drinking: Never       OBJECTIVE:     Vital Signs Range:  BMI Readings from Last 1 Encounters:   11/25/24 19.44 kg/m²       Temp:  [36.3 °C (97.4 °F)-36.6 °C (97.9 °F)]   Pulse:  [55-95]   Resp:  [16-22]   BP: (153-228)/()   SpO2:  [95 %-100 %]        Significant Labs:        Component Value Date/Time    WBC 18.08 (H) 11/25/2024 0722    HGB 11.2 (L) 11/25/2024 0722    HCT 35.5 (L) 11/25/2024 0722    HCT 38 07/05/2022 1433     11/25/2024 0722     11/25/2024 0722    K 3.3  (L) 11/25/2024 0722     (H) 11/25/2024 0722    CO2 20 (L) 11/25/2024 0722     (H) 11/25/2024 0722    BUN 24 (H) 11/25/2024 0722    CREATININE 1.0 11/25/2024 0722    MG 1.5 (L) 11/25/2024 1032    PHOS 2.4 (L) 11/25/2024 1032    CALCIUM 9.5 11/25/2024 0722    ALBUMIN 3.6 11/25/2024 0722    PROT 6.5 11/25/2024 0722    ALKPHOS 61 11/25/2024 0722    BILITOT 0.5 11/25/2024 0722    AST 29 11/25/2024 0722    ALT 29 11/25/2024 0722    INR 1.1 11/25/2024 0744    HGBA1C 5.9 (H) 11/25/2024 1032        Please see Results Review for additional labs.     Diagnostic Studies: No relevant studies.    EKG:   Results for orders placed or performed during the hospital encounter of 11/25/24   EKG 12-lead    Collection Time: 11/25/24  6:49 AM   Result Value Ref Range    QRS Duration 126 ms    [facility] QTC Calculation 523 ms    Narrative    Test Reason : M25.559,    Vent. Rate :  89 BPM     Atrial Rate :  89 BPM     P-R Int : 172 ms          QRS Dur : 126 ms      QT Int : 430 ms       P-R-T Axes :    -45  65 degrees    QTcB Int : 523 ms    Sinus rhythm with Premature atrial complexes  Left axis deviation  Nonspecific intraventricular block  T wave abnormality, consider anterior ischemia  Abnormal ECG  When compared with ECG of 30-Mar-2024 19:23,  Nonspecific intraventricular block has replaced Incomplete right bundle  branch block  Confirmed by Shad Mike ([id number]) on 11/25/2024 9:21:36 AM    Referred By: AAAREFERRAL SELF           Confirmed By: Shad Mike       ECHO:  See subjective, if available.      ASSESSMENT/PLAN:                                                                                                                  11/25/2024  Sussy Costa is a 89 y.o., female.      Pre-op Assessment    I have reviewed the Patient Summary Reports.    I have reviewed the NPO Status.      Review of Systems  Anesthesia Hx:  No problems with previous Anesthesia   History of prior surgery of interest to airway management or planning:           Denies Family Hx of Anesthesia complications.    Denies Personal Hx of Anesthesia complications.                    Hematology/Oncology:                   Hematology Comments: Thrombocytopenia                    Cardiovascular:     Hypertension    Dysrhythmias       hyperlipidemia                         Hypertension     Atrial Fibrillation     Pulmonary:    Asthma       Asthma:               Renal/:  Chronic Renal Disease, CKD        Kidney Function/Disease             Hepatic/GI:        chronic pancreatitis             Musculoskeletal:  Arthritis        Arthritis     Spine Disorders: lumbar Degenerative disease and Disc disease           Neurological:      Arthritis                           Endocrine:  Diabetes, type 2    Diabetes                          Physical Exam  General: Well nourished, Cooperative, Oriented and Alert    Airway:  Mallampati: II / II  Mouth Opening: Normal  TM Distance: Normal  Tongue: Normal  Neck ROM: Normal ROM    Dental:  Intact  Few missing molars  Chest/Lungs:  Normal Respiratory Rate    Heart:  Rate: Normal        Anesthesia Plan  Type of Anesthesia, risks & benefits discussed:    Anesthesia Type: Gen ETT  Intra-op Monitoring Plan: Standard ASA Monitors  Post Op Pain Control Plan: multimodal analgesia  Induction:  IV  Informed Consent: Informed consent signed with the Patient and all parties understand the risks and agree with anesthesia plan.  All questions answered.   ASA Score: 3  Day of Surgery Review of History & Physical: H&P Update referred to the surgeon/provider.    Ready For Surgery From Anesthesia Perspective.     .

## 2024-11-25 NOTE — HPI
Ms. Sussy Costa is an 89-year-old female w/ a h/o left intertrochanteric fracture s/p surgery 04/2024, Type 2 DM, CKD stage 3, HTN, pancreatic insufficiency, Asthma, Afib that presents for evaluation of hip pain. She was getting up to walk to the bathroom this morning an hour prior to arrival, took 2 steps and then felt herself falling. She fell onto her right side and attempted to grab her dresser and scraped her arm. She is unsure if she hit her head or had a syncopal event before falling. She denies any preceding chest pain, palpitations, lightheadedness or dizziness. She reports 8/10 right-sided hip pain that extends to mid thigh. She reports minimal blood loss from her arm. Bleeding was controlled with a dressing. She has not eaten since dinner at 6:00 pm yesterday. Sip of water at 11:00 p.m. yesterday for meds. She denied any chest pain, shortness of breath, abdominal pain. She did not take her medications this morning.     In ED, Pt hypertensive 228/105, otherwise AFVSS on RA. WBC 18, hgb 11.2. K 3.3. . Trop 0.022. XR b/l hips: Acute fracture of the right hip. CTH: No acute intracranial hemorrhage. CT C spine: Broad central disc herniation with stable extrusion and superior migration fine the C5 vertebral body flattens the traversing cord with presumably some encroachment on the exiting right C6 nerve root. No acute cervical fracture. Given IV morphine, IV antiemetics. RUE lac repaired.

## 2024-11-25 NOTE — ASSESSMENT & PLAN NOTE
Sussy Costa is a 89 y.o. female with right IT femur fracture, closed, NVI. They take no anticoagulation at home. They required a cane prior to this injury.     -Admitted to medicine, [facility] 1  -To OR today for operative fixation of R IT fracture  -Pt marked, booked, and consented for surgery  -DVT PPx: Hold anticoagulation  -Abx: Preop abx ordered  -Labs: Prealbumin 12, UA neg, Glucose 161, Albumin 3.6, Hemoglobin 11.2, Hematocrit 35.5, Platelets 216, White blood cell count 18.08, A1c 5.9, INR 1.1. Other lab results pending.  -Bed rest, ca, NPO at midnight  -Iv: ordered for contralateral arm    Patient was explained in detail the severity of the injury that was suffered. Patient was explained the risks/benefits/and alternatives to operative management in detail including infection, bleeding, pain, nerve and vascular damage, heterotopic ossification, leg length discrepancies, rotational deformities and they express full understanding.  We discussed the 30% national first year mortality rate with hip fractures, the need for early mobilization, and the expected rehab course.  She expresses full understanding of the condition and expresses that they want to proceed with surgery. The patient is admitted to the medicine hip fracture service for optimization of medical comorbidities. Will plan for OR today. No guarantees were made, informed consent was obtained. All questions were answered to patient's and family's satisfaction.

## 2024-11-25 NOTE — ED PROVIDER NOTES
Encounter Date: 11/25/2024       History     Chief Complaint   Patient presents with    Fall     Fall. Per ems pt fell walking to restroom. Obvious shortening to right leg with outward rotation     Patient is an 89-year-old female that presents to the emergency department for hip pain.  She was getting up to walk to the bathroom this morning an hour prior to arrival and took 2 steps and then felt lightheaded and fell.  She fell onto her right side and attempted to grab her dresser and scraped her arm.  She is unsure if she hit her head or had a syncopal event before falling.  She denies any neck pain.  She reports 8/10 right-sided hip pain that extends to mid femur.  She reports minimal blood loss from her arm.  Bleeding was controlled with a dressing.  She has not eaten since dinner at 6:00 a.m. yesterday.  Sip of water at 11:00 p.m. yesterday for meds.  She denied any chest pain, shortness of breath, abdominal pain.        Review of patient's allergies indicates:   Allergen Reactions    Aspirin Other (See Comments)     Asthma    TRIAD OF NASAL POLYPS, ASA  ALLERGY AND ASTHMA.        Aspirin Other (See Comments)    Nsaids (non-steroidal anti-inflammatory drug) Other (See Comments)     AVOID DUE TO TRIAD OF ASA ALLERGY, NASAL POLYPS,AND ASTHMA  AVOID DUE TO TRIAD OF ASA ALLERGY, NASAL POLYPS,AND ASTHMA    Penicillin      Other reaction(s): Rash    9/30/20: tolerates ceftriaxone    Penicillin g Other (See Comments)     Other reaction(s): Rash    9/30/20: tolerates ceftriaxone     Past Medical History:   Diagnosis Date    Asthma     Cyst of pancreas     liver    Diabetes mellitus type II     Eczema of hand     Glaucoma     History of colon cancer 10/28/2022    Right sided colon cancer diagnosed in setting of LBO requiring urgent hemicolectomy 10/25/2022 with path showing pT4aN0 disease. CT chest 12/7/22 with possible lung metastases and MR liver with indeterminate liver lesions. Subsequent liver MR less concerning for  See PA encounter.    metastases and lung biopsy showed typical carcinoid rather than metastatic colon cancer. Patient elected for surveillance.       History of recurrent pneumonia 09/28/2020    Hyperlipidemia     Hypertension     Lichen planus     gums    Osteoporosis     Other chronic pancreatitis 03/01/2023    Pancreas cyst 03/18/2016    Pulmonary nodules     Spinal stenosis of lumbar region 08/30/2018     Past Surgical History:   Procedure Laterality Date    BRONCHOSCOPY N/A 05/13/2022    Procedure: Bronchoscopy;  Surgeon: [facility] Diagnostic Provider;  [facility];  Service: Anesthesiology;  Laterality: N/A;    CATARACT EXTRACTION, BILATERAL      CHOLECYSTECTOMY      COLECTOMY, RIGHT Right 10/25/2022    Procedure: COLECTOMY, RIGHT;  Surgeon: Jass Holman MD;  Location: [facility] OR 2ND FLR;  Service: Colon and Rectal;  Laterality: Right;    COLONOSCOPY N/A 06/26/2023    Procedure: COLONOSCOPY;  Surgeon: Jass Holman MD;  Location: [facility] ENDO (2ND FLR);  Service: Endoscopy;  Laterality: N/A;  2nd floor -lung disease  referral Dr [id number] portal/mailed-GT  6/20/23- Prec[place] confirmed- [place]    ENDOSCOPIC ULTRASOUND OF UPPER GASTROINTESTINAL TRACT N/A 04/22/2022    Procedure: ULTRASOUND, UPPER GI TRACT, ENDOSCOPIC;  Surgeon: Max Rosado MD;  Location: [facility] ENDO (2ND FLR);  Service: Endoscopy;  Laterality: N/A;  urgent EUS (linear) for abnormal CT scan with dilated PD and increased cyst of pancreas cyst.  pap 44 mmHg  4/13:fully vaccinated. instructions via portal-SC    EPIDURAL STEROID INJECTION INTO LUMBAR SPINE N/A 11/08/2018    Procedure: Injection-steroid-epidural-lumbar L5-S1;  Surgeon: Nicci Osorio Jr., MD;  Location: [facility] PAIN MGT;  Service: Pain Management;  Laterality: N/A;    FUNCTIONAL ENDOSCOPIC SINUS SURGERY (FESS) USING COMPUTER-ASSISTED NAVIGATION N/A 06/17/2019    Procedure: FESS, USING COMPUTER-ASSISTED NAVIGATION;  Surgeon: Rosangela Isabel MD;  Location: [facility] OR;  Service:  ENT;  Laterality: N/A;  video    HIP SURGERY Left 04/2024    HYSTERECTOMY      INTRAMEDULLARY RODDING OF FEMUR Left 03/31/2024    Procedure: INSERTION, INTRAMEDULLARY CAYDEN, FEMUR - Left,;  Surgeon: Nakul Walker MD;  Location: [facility] OR [address] [place] [address];  Service: Orthopedics;  Laterality: Left;    INTRAMEDULLARY RODDING OF FEMUR Right 11/25/2024    Procedure: INSERTION, INTRAMEDULLARY CAYDEN, FEMUR;  Surgeon: Lorenzo Blunt MD;  Location: [facility] OR [address] [place] [address];  Service: Orthopedics;  Laterality: Right;    nasal polyps       Family History   Problem Relation Name Age of Onset    Heart disease Father      Heart disease Brother      Hypertension Brother       Social History     Tobacco Use    Smoking status: Never     Passive exposure: Never    Smokeless tobacco: Never   Substance Use Topics    Alcohol use: Yes     Comment: socially    Drug use: No     Review of Systems    Physical Exam     Initial Vitals [11/25/24 0632]   BP Pulse Resp Temp SpO2   (!) 153/103 76 18 97.7 °F (36.5 °C) 100 %      MAP       --         Physical Exam    Constitutional: She appears well-developed and well-nourished.   HENT:   Head: Normocephalic and atraumatic.   No signs of basilar skull fracture including raccoon eyes and whyte sign   Eyes: EOM are normal. Pupils are equal, round, and reactive to light.   Neck: Neck supple.   Normal range of motion.  Cardiovascular:  Normal rate, regular rhythm and intact distal pulses.           Pulmonary/Chest: Breath sounds normal. No respiratory distress. She has no wheezes. She has no rales.   Abdominal: Abdomen is soft. She exhibits no distension. There is no abdominal tenderness.   Musculoskeletal:      Cervical back: Normal range of motion and neck supple.      Comments: Right hip is tender to palpation.  Visually shortened and externally rotated the right lower extremity.  Compartments are soft neurovascularly intact.     Neurological: She is alert and oriented to person, place, and time.   Skin:  Skin is warm and dry. Capillary refill takes less than 2 seconds.         ED Course   Lac Repair    Date/Time: 11/28/2024 5:56 PM    Performed by: Roberto Flores MD  Authorized by: Reginald Houston MD    Consent:     Consent obtained:  Verbal    Consent given by:  Patient    Risks, benefits, and alternatives were discussed: yes      Risks discussed:  Infection, pain, need for additional repair and poor cosmetic result    Alternatives discussed:  No treatment and observation  Universal protocol:     Procedure explained and questions answered to patient or proxy's satisfaction: yes      Patient identity confirmed:  Verbally with patient, hospital-assigned identification number, arm band and provided demographic data  Anesthesia:     Anesthesia method:  Local infiltration    Local anesthetic:  Lidocaine 1% w/o epi  Laceration details:     Location:  Shoulder/arm    Shoulder/arm location:  R lower arm    Length (cm):  5    Depth (mm):  1  Pre-procedure details:     Preparation:  Patient was prepped and draped in usual sterile fashion  Exploration:     Hemostasis achieved with:  Direct pressure  Treatment:     Area cleansed with:  Saline    Amount of cleaning:  Standard    Irrigation solution:  Sterile saline    Irrigation method:  Pressure wash  Skin repair:     Repair method:  Sutures    Suture size:  4-0    Suture material:  Prolene    Suture technique:  Simple interrupted    Number of sutures:  5  Approximation:     Approximation:  Close  Repair type:     Repair type:  Simple  Post-procedure details:     Dressing:  Antibiotic ointment    Procedure completion:  Tolerated    Labs Reviewed   CBC W/ AUTO DIFFERENTIAL - Abnormal       Result Value    WBC 18.08 (*)     RBC 3.71 (*)     Hemoglobin 11.2 (*)     Hematocrit 35.5 (*)     MCV 96      MCH 30.2      MCHC 31.5 (*)     RDW 14.2      Platelets 216      MPV 11.5      Immature Granulocytes 0.6 (*)     Gran # (ANC) 13.1 (*)     Immature Grans (Abs) 0.10 (*)     Lymph  # 2.2      Mono # 0.9      Eos # 1.7 (*)     Baso # 0.09      nRBC 0      Gran % 72.3      Lymph % 12.2 (*)     Mono % 5.0      Eosinophil % 9.4 (*)     Basophil % 0.5      Differential Method Automated     COMPREHENSIVE METABOLIC PANEL - Abnormal    Sodium 144      Potassium 3.3 (*)     Chloride 115 (*)     CO2 20 (*)     Glucose 161 (*)     BUN 24 (*)     Creatinine 1.0      Calcium 9.5      Total Protein 6.5      Albumin 3.6      Total Bilirubin 0.5      Alkaline Phosphatase 61      AST 29      ALT 29      eGFR 54.2 (*)     Anion Gap 9     B-TYPE NATRIURETIC PEPTIDE - Abnormal     (*)    URINALYSIS, REFLEX TO URINE CULTURE - Abnormal    Specimen UA Urine, Clean Catch      Color, UA Colorless (*)     Appearance, UA Clear      pH, UA 6.0      Specific Gravity, UA 1.010      Protein, UA Negative      Glucose, UA 4+ (*)     Ketones, UA Negative      Bilirubin (UA) Negative      Occult Blood UA Negative      Nitrite, UA Negative      Leukocytes, UA Negative      Narrative:     Specimen Source->Urine   HEMOGLOBIN A1C - Abnormal    Hemoglobin A1C 5.9 (*)     Estimated Avg Glucose 123     MAGNESIUM - Abnormal    Magnesium 1.5 (*)    PHOSPHORUS - Abnormal    Phosphorus 2.4 (*)    PREALBUMIN - Abnormal    Prealbumin 12 (*)    VITAMIN D - Abnormal    Vit D, 25-Hydroxy 24 (*)    TROPONIN I    Troponin I 0.022     PROTIME-INR    Prothrombin Time 12.3      INR 1.1     APTT    aPTT 25.2     TROPONIN I    Troponin I 0.017     TRANSFERRIN    Transferrin 206     URINALYSIS MICROSCOPIC    RBC, UA 0      WBC, UA 0      Bacteria Rare      Yeast, UA None      Squam Epithel, UA 0      Microscopic Comment SEE COMMENT      Narrative:     Specimen Source->Urine   TYPE & SCREEN    Group & Rh A POS      Indirect Maryellen NEG      Specimen Outdate 11/28/2024 23:59          ECG Results              EKG 12-lead (Final result)        Collection Time Result Time QRS Duration OHS QTC Calculation    11/25/24 11:39:27 11/25/24 17:12:13 116 492                      Final result by Interface, Lab In [facility] (11/25/24 17:12:21)                   Narrative:    Test Reason :    Vent. Rate :  57 BPM     Atrial Rate :  53 BPM     P-R Int : 160 ms          QRS Dur : 116 ms      QT Int : 506 ms       P-R-T Axes : -67  65  88 degrees    QTcB Int : 492 ms    Unusual P axis, possible ectopic atrial bradycardia with frequent Premature  ventricular complexes and Fusion complexes  Incomplete right bundle branch block  Abnormal ECG  When compared with ECG of 25-Nov-2024 06:49,  Ectopic atrial rhythm has replaced Sinus rhythm  Vent. rate has decreased by  32 bpm  The axis Shifted right  Confirmed by Shad Mike ([id number]) on 11/25/2024 5:12:12 PM    Referred By: AAAREFERRAL SELF           Confirmed By: Shad Mike                                     EKG 12-lead (Final result)        Collection Time Result Time QRS Duration OHS QTC Calculation    11/25/24 06:49:14 11/25/24 09:21:43 126 523                     Final result by Interface, Lab In [facility] (11/25/24 09:21:48)                   Narrative:    Test Reason : M25.559,    Vent. Rate :  89 BPM     Atrial Rate :  89 BPM     P-R Int : 172 ms          QRS Dur : 126 ms      QT Int : 430 ms       P-R-T Axes :    -45  65 degrees    QTcB Int : 523 ms    Sinus rhythm with Premature atrial complexes  Left axis deviation  Nonspecific intraventricular block  T wave abnormality, consider anterior ischemia  Abnormal ECG  When compared with ECG of 30-Mar-2024 19:23,  Nonspecific intraventricular block has replaced Incomplete right bundle  branch block  Confirmed by Shad Mike ([id number]) on 11/25/2024 9:21:36 AM    Referred By: AAAREFERRAL SELF           Confirmed By: Shad Mike                                  Imaging Results              X-Ray Chest 1 View Pre-OP (Final result)  Result time 11/25/24 11:24:59      Final result by Johan Seo MD (11/25/24 11:24:59)                   Impression:      No significant intrathoracic abnormality.   No significant detrimental interval change in the appearance of the chest since 03/30/2024 at 09:18 is appreciated.      Electronically signed by: Johan Seo MD  Date:    11/25/2024  Time:    11:24               Narrative:    EXAMINATION:  XR CHEST 1 VIEW PRE-OP    CLINICAL HISTORY:  pre op;    TECHNIQUE:  One view    COMPARISON:  Comparison is made to 03/30/2024 at 21:18.    FINDINGS:  Allowing for magnification of the cardiomediastinal silhouette related to projection, the true heart size is at the upper limit of normal to minimally enlarged, and the cardiomediastinal silhouette has not changed appreciably since the examination referenced above.  Lung zones are stable since the prior exam, with no new areas of airspace consolidation or volume loss evident.  No pleural fluid.  No abnormal mediastinal widening, with tortuosity of the thoracic aorta and calcification in its wall again observed.  A skin fold is superimposed over the right hemithorax, but no pneumothorax is seen.  Incidental observations again include some scoliosis convex to the right in the upper thoracic region and a minor deformity relating to an old healed fracture of the lateral aspect of the right 6th rib.                                       CT Head Without Contrast (Final result)  Result time 11/25/24 08:40:10      Final result by Kristian Harmon MD (11/25/24 08:40:10)                   Impression:      No acute intracranial hemorrhage/injury.      Electronically signed by: Kristian Harmon  Date:    11/25/2024  Time:    08:40               Narrative:    EXAMINATION:  CT HEAD WITHOUT CONTRAST    CLINICAL HISTORY:  Head trauma, minor (Age >= 65y);    TECHNIQUE:  Low dose axial images were obtained through the head.  Coronal and sagittal reformations were also performed. Contrast was not administered.    COMPARISON:  None.    FINDINGS:  There is no evidence of hydrocephalus, mass effect, intracranial hemorrhage or acute territorial  infarct.    Decreased attenuation in the periventricular white matter is nonspecific but may reflect moderate chronic small vessel ischemic change.    No acute calvarial fracture is identified.  There is opacification of the frontal and residual anterior ethmoid air cells with postsurgical changes within the paranasal sinuses.                                       CT Cervical Spine Without Contrast (Final result)  Result time 11/25/24 08:47:01      Final result by Kristian Harmon MD (11/25/24 08:47:01)                   Impression:      No acute cervical fracture.    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      Electronically signed by: Kristian Harmon  Date:    11/25/2024  Time:    08:47               Narrative:    EXAMINATION:  CT CERVICAL SPINE WITHOUT CONTRAST    CLINICAL HISTORY:  Neck trauma (Age >= 65y);    TECHNIQUE:  Low dose axial images, sagittal and coronal reformations were performed though the cervical spine.  Contrast was not administered.    COMPARISON:  None    FINDINGS:  There is no evidence of acute cervical fracture.    The prevertebral soft tissues are unremarkable.    Degenerative changes are identified within the cervical spine.  At C5-6 there is a broad central disc herniation with extrusion and superior migration by in the C5 vertebral body that results in flattening of the traversing cord and encroachment on the exiting right C6 nerve root.    Atherosclerotic vascular calcifications are identified at the carotid bifurcations bilaterally.                                       X-Ray Hips Bilateral 2 View Incl AP Pelvis (Final result)  Result time 11/25/24 08:20:04      Final result by Johan Miles MD (11/25/24 08:20:04)                   Impression:      Acute fracture of the right hip.      Electronically signed by: Johan  MD Jd  Date:    11/25/2024  Time:    08:20               Narrative:    EXAMINATION:  XR HIPS BILATERAL 2 VIEW INCL AP PELVIS    CLINICAL HISTORY:  Pain in unspecified hip    TECHNIQUE:  AP view of the pelvis and frogleg lateral views of both hips were performed.    COMPARISON:  None 04/29/2024.    FINDINGS:  There is an  acute  comminuted  inter trochanteric fracture identified involving the right hip.    Postoperative changes of internal fixation of the left hip and femur identified as before.  No bone destruction.                                       Medications   morphine injection 2 mg (0 mg Intravenous Hold 11/25/24 1015)   droPERidol injection 0.625 mg ( Intravenous Canceled Entry 11/25/24 1045)   0.9% NaCl infusion ( Intravenous Rate/Dose Change 11/26/24 1306)   0.9% NaCl infusion ( Intravenous New Bag 11/27/24 0917)   morphine injection 2 mg (2 mg Intravenous Given 11/25/24 0721)   ondansetron injection 4 mg (4 mg Intravenous Given 11/25/24 0737)   LIDOcaine (PF) 10 mg/ml (1%) injection 100 mg (100 mg Infiltration Given 11/25/24 0830)   ondansetron injection 4 mg (4 mg Intravenous Given 11/25/24 0854)   mupirocin 2 % ointment 1 g (1 g Nasal Given 11/25/24 1643)   acetaminophen tablet 1,000 mg (1,000 mg Oral Given 11/27/24 1730)   vancomycin (VANCOCIN) 1,000 mg in 0.9% NaCl 250 mL IVPB (admixture device) (0 mg Intravenous Stopped 11/25/24 1814)   vancomycin (VANCOCIN) 1,000 mg in 0.9% NaCl 250 mL IVPB (admixture device) (0 mg Intravenous Stopped 11/26/24 1930)   bacitracin zinc ointment 1 each (1 each Topical (Top) Given 11/25/24 1030)   electrolytes-dextrose (Pedialyte) oral solution 400 mL (400 mLs Oral Given 11/26/24 2025)   sodium chloride 0.9% bolus 500 mL 500 mL (0 mLs Intravenous Stopped 11/26/24 1715)     Medical Decision Making  Sussy Costa is a 89 y.o. female presenting to the ED with right intertrochanteric comminuted hip fracture. History and physical exam as above. Initial vital signs  stable and non-actionable. Pain addressed with total 4 mg IV morphine. Initial work-up to include: Labs (CBC, CMP, BNP, troponin and type and screen), Imaging (CT Head, hip x-rays, CT cervical spine), EKG,    Differential diagnosis considered for this patient includes, but is not limited to:  Right hip fracture.  Other severe, more emergent diagnoses considered, but deemed much less likely, to include:  Compartment syndrome, neurovascular damage.    We discussed the case with orthopedic surgery and they recommend for the patient to be NPO and admitted to Hospital Medicine for surgery.  We discussed the case with [facility] and they will admit the patient to their service.      Amount and/or Complexity of Data Reviewed  Labs: ordered. Decision-making details documented in ED Course.  Radiology: ordered. Decision-making details documented in ED Course.    Risk  OTC drugs.  Prescription drug management.  Decision regarding hospitalization.              Attending Attestation:   Physician Attestation Statement for Resident:  As the supervising MD   Physician Attestation Statement: I have personally seen and examined this patient.   I agree with the above history.  -:   As the supervising MD I agree with the above PE.     As the supervising MD I agree with the above treatment, course, plan, and disposition.                Attending ED Notes:   STAFF ATTENDING PHYSICIAN NOTE:  I have individually/jointly evaluated Sussy Costa and discussed their ED management with the resident physician. I have also reviewed their notes, assessments, and procedures documented.  I was present during all critical portions of any procedure(s) performed on Sussy Costa.   ____________________  Dre Houston MD, [facility]  Emergency Medicine Staff        ED Course as of 12/03/24 0544   Mon Nov 25, 2024   0758 Troponin I: 0.022 [HJ]   0758 CBC auto differential(!)  Leukocytosis noted, hemoglobin is increased from baseline [HJ]   0754  "Comprehensive metabolic panel(!)  CBC largely unremarkable [HJ]   0759 Troponin I: 0.022 [HJ]   0819 BNP(!): 267  Baseline [HJ]   0832 X-Ray Hips Bilateral 2 View Incl AP Pelvis  Radiology reads "acute  comminuted  inter trochanteric fracture identified involving the right hip." [HJ]   0837 CT Head Without Contrast  As per my independent interpretation no acute bleed [HJ]      ED Course User Index  [HJ] Roberto Flores MD                           Clinical Impression:  Final diagnoses:  [M25.559] Hip pain  [S72.001A] Closed fracture of right hip, initial encounter          ED Disposition Condition    Admit                 Roberto Flores MD  Resident  11/25/24 1733       Roberto Flores MD  Resident  11/28/24 1758       Reginald Houston MD  12/03/24 0544    "

## 2024-11-25 NOTE — SUBJECTIVE & OBJECTIVE
Past Medical History:   Diagnosis Date    Asthma     Cyst of pancreas     liver    Diabetes mellitus type II     Eczema of hand     Glaucoma     History of colon cancer 10/28/2022    Right sided colon cancer diagnosed in setting of LBO requiring urgent hemicolectomy 10/25/2022 with path showing pT4aN0 disease. CT chest 12/7/22 with possible lung metastases and MR liver with indeterminate liver lesions. 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 Patient elected for surveillance.       History of recurrent pneumonia 09/28/2020    Hyperlipidemia     Hypertension     Lichen planus     gums    Osteoporosis     Other chronic pancreatitis 03/01/2023    Pancreas cyst 03/18/2016    Pulmonary nodules     Spinal stenosis of lumbar region 08/30/2018       Past Surgical History:   Procedure Laterality Date    BRONCHOSCOPY N/A 05/13/2022    Procedure: Bronchoscopy;  Surgeon: [facility] Provider;  [facility];  Service: Anesthesiology;  Laterality: N/A;    CATARACT EXTRACTION, BILATERAL      CHOLECYSTECTOMY      COLECTOMY, RIGHT Right 10/25/2022    Procedure: COLECTOMY, RIGHT;  Surgeon: Jass Holman MD;  [facility];  Service: Colon and Rectal;  Laterality: Right;    COLONOSCOPY N/A 06/26/2023    Procedure: COLONOSCOPY;  Surgeon: Jass Holman MD;  Location: [facility] ENDO (2ND FLR);  Service: Endoscopy;  Laterality: N/A;  2nd floor -lung disease  referral Dr [id number]-[id number] portal/mailed-GT  6/20/23- Precall confirmed- [place]    ENDOSCOPIC ULTRASOUND OF UPPER GASTROINTESTINAL TRACT N/A 04/22/2022    Procedure: ULTRASOUND, UPPER GI TRACT, ENDOSCOPIC;  Surgeon: Max Rosado MD;  [facility]  Service: Endoscopy;  Laterality: N/A;  urgent EUS (linear) for abnormal CT scan with dilated PD and increased cyst of pancreas cyst.  pap 44 mmHg  4/13:fully vaccinated. instructions via  "portal-SC    EPIDURAL STEROID INJECTION INTO LUMBAR SPINE N/A 11/08/2018    Procedure: Injection-steroid-epidural-lumbar L5-S1;  Surgeon: Nicci Osorio Jr., MD;  Location: [facility] PAIN MGT;  Service: Pain Management;  Laterality: N/A;    FUNCTIONAL ENDOSCOPIC SINUS SURGERY (FESS) USING COMPUTER-ASSISTED NAVIGATION N/A 06/17/2019    Procedure: FESS, USING COMPUTER-ASSISTED NAVIGATION;  Surgeon: Rosangela Isabel MD;  Location: [facility] OR;  Service: ENT;  Laterality: N/A;  video    HIP SURGERY Left 04/2024    HYSTERECTOMY      INTRAMEDULLARY RODDING OF FEMUR Left 03/31/2024    Procedure: INSERTION, INTRAMEDULLARY CAYDEN, FEMUR - Left,;  Surgeon: Nakul Walker MD;  Location: [address];  Service: Orthopedics;  Laterality: Left;    nasal polyps         Review of patient's allergies indicates:   Allergen Reactions    Aspirin Other (See Comments)     Asthma    TRIAD OF NASAL POLYPS, ASA  ALLERGY AND ASTHMA.        Aspirin Other (See Comments)    Nsaids (non-steroidal anti-inflammatory drug) Other (See Comments)     AVOID DUE TO TRIAD OF ASA ALLERGY, NASAL POLYPS,AND ASTHMA  AVOID DUE TO TRIAD OF ASA ALLERGY, NASAL POLYPS,AND ASTHMA    Penicillin      Other reaction(s): Rash    9/30/20: tolerates ceftriaxone    Penicillin g Other (See Comments)     Other reaction(s): Rash    9/30/20: tolerates ceftriaxone       No current facility-administered medications on file prior to encounter.     Current Outpatient Medications on File Prior to Encounter   Medication Sig    ACCU-CHEK GUIDE TEST STRIPS Strp TEST FOUR TIMES DAILY WITH MEALS AND NIGHTLY    acetaminophen (TYLENOL) 500 MG tablet Take 2 tablets (1,000 mg total) by mouth every 8 (eight) hours.    ascorbic acid, vitamin C, (VITAMIN C) 500 MG tablet Take 500 mg by mouth once daily.    atorvastatin (LIPITOR) 20 MG tablet Take 1 tablet (20 mg total) by mouth every evening.    BD ULTRA-FINE SHORT PEN NEEDLE 31 gauge x 5/16" Ndle USE with insulin pen 5 TIMES A DAY    " denosumab (PROLIA) 60 mg/mL Syrg Inject 1 mL (60 mg total) into the skin every 6 (six) months. Hold at rehab    empagliflozin (JARDIANCE) 10 mg tablet Take 1 tablet (10 mg total) by mouth once daily.    fluticasone-umeclidin-vilanter (TRELEGY ELLIPTA) 200-62.5-25 mcg inhaler Inhale 1 puff into the lungs once daily.    insulin aspart U-100 (NOVOLOG FLEXPEN U-100 INSULIN) 100 unit/mL (3 mL) InPn pen Inject 5 Units into the skin 3 (three) times daily with meals.    insulin glargine U-100, Lantus, (LANTUS SOLOSTAR U-100 INSULIN) 100 unit/mL (3 mL) InPn pen Inject 6 Units into the skin once daily AND 5 Units every evening.    lancets (ACCU-CHEK SOFTCLIX LANCETS) Misc 1 each by Misc.(Non-Drug; Combo Route) route 4 (four) times daily.    latanoprost 0.005 % ophthalmic solution Inject into the eye. 1 Drops Ophthalmic Every evening    lipase-protease-amylase 24,000-76,000-120,000 units (CREON) 24,000-76,000 -120,000 unit capsule TAKE 2 CAPSULES WITH MEALS AND 1 CAPSULE WITH SNACKS.    losartan (COZAAR) 100 MG tablet Take 1 tablet (100 mg total) by mouth once daily.    magnesium oxide (MAG-OX) 400 mg (241.3 mg magnesium) tablet Take 400 mg by mouth once daily.    montelukast (SINGULAIR) 10 mg tablet Take 1 tablet (10 mg total) by mouth every evening.    olopatadine (PATANOL) 0.1 % ophthalmic solution Place 1 drop into both eyes 2 (two) times daily as needed for Allergies. 1 Drops Ophthalmic Twice a day    senna-docusate 8.6-50 mg (PERICOLACE) 8.6-50 mg per tablet Take 1 tablet by mouth 2 (two) times daily.    triamcinolone acetonide 0.1% (KENALOG) 0.1 % cream Apply topically 2 (two) times daily.    vitamin D (VITAMIN D3) 1000 units Tab Take 1,000 Units by mouth once daily.    [DISCONTINUED] risedronate (ACTONEL) 35 MG tablet Take 1 tablet (35 mg total) by mouth every 7 days.     Family History       Problem Relation (Age of Onset)    Heart disease Father, Brother    Hypertension Brother          Tobacco Use    Smoking status:  Never     Passive exposure: Never    Smokeless tobacco: Never   Substance and Sexual Activity    Alcohol use: Yes     Comment: socially    Drug use: No    Sexual activity: Yes     Partners: Male     Birth control/protection: Post-menopausal     Review of Systems   Constitutional:  Negative for activity change, chills, diaphoresis, fatigue and fever.   HENT:  Negative for congestion, rhinorrhea and sore throat.    Respiratory:  Negative for cough, chest tightness, shortness of breath and wheezing.    Cardiovascular:  Negative for chest pain, palpitations and leg swelling.   Gastrointestinal:  Positive for nausea and vomiting. Negative for abdominal distention, abdominal pain, blood in stool, constipation and diarrhea.   Genitourinary:  Negative for difficulty urinating, dysuria, frequency, hematuria and urgency.   Musculoskeletal:  Positive for arthralgias. Negative for back pain and neck stiffness.   Neurological:  Positive for syncope. Negative for dizziness, tremors, seizures, weakness, light-headedness, numbness and headaches.   Psychiatric/Behavioral:  Negative for agitation, confusion and hallucinations.      Objective:     Vital Signs (Most Recent):  Temp: 97.7 °F (36.5 °C) (11/25/24 0813)  Pulse: 89 (11/25/24 1046)  Resp: 20 (11/25/24 1046)  BP: (!) 158/85 (11/25/24 1046)  SpO2: 97 % (11/25/24 1046) Vital Signs (24h Range):  Temp:  [97.7 °F (36.5 °C)] 97.7 °F (36.5 °C)  Pulse:  [75-89] 89  Resp:  [18-22] 20  SpO2:  [97 %-100 %] 97 %  BP: (153-228)/() 158/85     Weight: 53 kg (116 lb 13.5 oz)  Body mass index is 19.44 kg/m².     Physical Exam  Vitals and nursing note reviewed.   Constitutional:       General: She is not in acute distress.     Appearance: She is well-developed. She is not diaphoretic.   HENT:      Head: Normocephalic and atraumatic.      Right Ear: External ear normal.      Left Ear: External ear normal.      Nose: Nose normal. No congestion.      Mouth/Throat:      Pharynx: Oropharynx is  clear.   Eyes:      General: No scleral icterus.     Extraocular Movements: Extraocular movements intact.   Cardiovascular:      Rate and Rhythm: Normal rate and regular rhythm.      Pulses: Normal pulses.      Heart sounds: Normal heart sounds. No murmur heard.  Pulmonary:      Effort: Pulmonary effort is normal. No respiratory distress.      Breath sounds: Normal breath sounds. No wheezing or rales.   Abdominal:      General: Bowel sounds are normal. There is no distension.      Palpations: Abdomen is soft.      Tenderness: There is no abdominal tenderness. There is no guarding or rebound.   Musculoskeletal:      Cervical back: Neck supple.      Right lower leg: No edema.      Left lower leg: No edema.      Comments: externally rotated and shortened right lower leg   Skin:     General: Skin is warm and dry.      Capillary Refill: Capillary refill takes less than 2 seconds.   Neurological:      General: No focal deficit present.      Mental Status: She is alert and oriented to person, place, and time. Mental status is at baseline.   Psychiatric:         Mood and Affect: Mood normal.         Behavior: Behavior normal.         Thought Content: Thought content normal.                Significant Labs: All pertinent labs within the past 24 hours have been reviewed.  CBC:   Recent Labs   Lab 11/25/24 0722   WBC 18.08*   HGB 11.2*   HCT 35.5*        CMP:   Recent Labs   Lab 11/25/24 0722      K 3.3*   *   CO2 20*   *   BUN 24*   CREATININE 1.0   CALCIUM 9.5   PROT 6.5   ALBUMIN 3.6   BILITOT 0.5   ALKPHOS 61   AST 29   ALT 29   ANIONGAP 9     Cardiac Markers:   Recent Labs   Lab 11/25/24 0722   *     Troponin:   Recent Labs   Lab 11/25/24 0722   TROPONINI 0.022       Significant Imaging: I have reviewed all pertinent imaging results/findings within the past 24 hours.  Imaging Results              CT Head Without Contrast (Final result)  Result time 11/25/24 08:40:10      Final result  by Kristian Harmon MD (11/25/24 08:40:10)                   Impression:      No acute intracranial hemorrhage/injury.      Electronically signed by: Kristian Harmon  Date:    11/25/2024  Time:    08:40               Narrative:    EXAMINATION:  CT HEAD WITHOUT CONTRAST    CLINICAL HISTORY:  Head trauma, minor (Age >= 65y);    TECHNIQUE:  Low dose axial images were obtained through the head.  Coronal and sagittal reformations were also performed. Contrast was not administered.    COMPARISON:  None.    FINDINGS:  There is no evidence of hydrocephalus, mass effect, intracranial hemorrhage or acute territorial infarct.    Decreased attenuation in the periventricular white matter is nonspecific but may reflect moderate chronic small vessel ischemic change.    No acute calvarial fracture is identified.  There is opacification of the frontal and residual anterior ethmoid air cells with postsurgical changes within the paranasal sinuses.                                       CT Cervical Spine Without Contrast (Final result)  Result time 11/25/24 08:47:01      Final result by Kristian Harmon MD (11/25/24 08:47:01)                   Impression:      No acute cervical fracture.    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      Electronically signed by: Kristian Harmon  Date:    11/25/2024  Time:    08:47               Narrative:    EXAMINATION:  CT CERVICAL SPINE WITHOUT CONTRAST    CLINICAL HISTORY:  Neck trauma (Age >= 65y);    TECHNIQUE:  Low dose axial images, sagittal and coronal reformations were performed though the cervical spine.  Contrast was not administered.    COMPARISON:  None    FINDINGS:  There is no evidence of acute cervical fracture.    The prevertebral soft tissues are unremarkable.    Degenerative changes are identified within the cervical spine.   At C5-6 there is a broad central disc herniation with extrusion and superior migration by in the C5 vertebral body that results in flattening of the traversing cord and encroachment on the exiting right C6 nerve root.    Atherosclerotic vascular calcifications are identified at the carotid bifurcations bilaterally.                                       X-Ray Hips Bilateral 2 View Incl AP Pelvis (Final result)  Result time 11/25/24 08:20:04      Final result by Johan Miles MD (11/25/24 08:20:04)                   Impression:      Acute fracture of the right hip.      Electronically signed by: Johan Miles MD  Date:    11/25/2024  Time:    08:20               Narrative:    EXAMINATION:  XR HIPS BILATERAL 2 VIEW INCL AP PELVIS    CLINICAL HISTORY:  Pain in unspecified hip    TECHNIQUE:  AP view of the pelvis and frogleg lateral views of both hips were performed.    COMPARISON:  None 04/29/2024.    FINDINGS:  There is an  acute  comminuted  inter trochanteric fracture identified involving the right hip.    Postoperative changes of internal fixation of the left hip and femur identified as before.  No bone destruction.

## 2024-11-25 NOTE — ED NOTES
Patient had 2 episodes of vomiting. Patient changed and provided clean linens and gown. New gauze applied to patient's skin tears to RUE.

## 2024-11-25 NOTE — ED NOTES
Assumed care of the patient. Report received from Bernadine, Jake. Pt on continuous cardiac monitoring, continuous pulse oximetry, and automatic BP cuff cycling Q15min. Pt in hospital gown, side rails up X2, bed low and locked, and call light is placed within reach. 1 family/visitors at bedside at this time. Pt complaining of R hip pain and nausea. C-collar in place,

## 2024-11-25 NOTE — PLAN OF CARE
[facility] - [facility]  Initial Discharge Assessment       Primary Care Provider: Leon Ramírez MD    Admission Diagnosis: Syncope [R55]  Hip pain [M25.559]    Admission Date: 11/25/2024  Expected Discharge Date: 11/27/2024         Payor: MEDICARE / Plan: MEDICARE PART A & B / Product Type: Government /     Extended Emergency Contact Information  Primary Emergency Contact: Chuck Costa  Address: [address] [place] [address]  Home Phone: [phone]  Work Phone: [phone]  Mobile Phone: [phone]  Relation: Spouse  Secondary Emergency Contact: Félix Banks  Work Phone: [phone]  Relation: Other  Preferred language: English   needed? No    Discharge Plan A: Rehab  Discharge Plan B: Skilled Nursing Facility      [facility] - TERE[facility] Mcmanus - [facility] AT Portal to Registered [facility]  [facility]  [place] [address]  Phone: [phone] Fax: [phone]    [facility] - [place], [place] - [address]  [address] [facility] [address]  Phone: [phone] Fax: [phone]    [id number] - [place], [place] - [address]  [address] [facility] [address]  Phone: [phone] Fax: [phone]    [id number] - [place], [place] - [address] [facility]  [address] [facility] [address]  Phone: [phone] Fax: [phone]    [facility]- [place], [place] - [address]  [address] [facility] [address] [address]  Phone: [phone] Fax: [phone]      Initial Assessment (most recent)       Adult Discharge Assessment - 11/25/24 1442          Discharge Assessment    Assessment Type Discharge Planning Assessment     Confirmed/corrected address, phone number and insurance Yes     Confirmed Demographics Correct on Facesheet     Source of Information patient     Does patient/caregiver understand observation status  Yes     Communicated ANTOINE with patient/caregiver Yes     Reason For Admission Fall     People in Home spouse     Facility Arrived From: Home     Do you expect to return to your current living situation? No     Do you have help at home or someone to help you manage your care at home? Yes     Who are your caregiver(s) and their phone number(s)? Chuck Costa (Spouse) [phone]     Prior to hospitilization cognitive status: Alert/Oriented     Current cognitive status: Alert/Oriented     Walking or Climbing Stairs Difficulty no     Dressing/Bathing Difficulty no     Equipment Currently Used at Home walker, rolling;cane, straight;wheelchair     Readmission within 30 days? No     Patient currently being followed by outpatient case management? No     Do you currently have service(s) that help you manage your care at home? No     Do you take prescription medications? Yes     Do you have prescription coverage? Yes     Do you have any problems affording any of your prescribed medications? No     Is the patient taking medications as prescribed? yes     Who is going to help you get home at discharge? Spouse-Chuck     How do you get to doctors appointments? family or friend will provide     Are you on dialysis? No     Do you take coumadin? No     Discharge Plan A Rehab     Discharge Plan B Skilled Nursing Facility        Physical Activity    On average, how many days per week do you engage in moderate to strenuous exercise (like a brisk walk)? 0 days     On average, how many minutes do you engage in exercise at this level? 0 min        Financial Resource Strain    How hard is it for you to pay for the very basics like food, housing, medical care, and heating? Not hard at all        Housing Stability    In the last 12 months, was there a time when you were not able to pay the mortgage or rent on time? No     At any time in the past 12 months, were you homeless or living in a shelter (including now)? No        Transportation Needs     Has the lack of transportation kept you from medical appointments, meetings, work or from getting things needed for daily living? No        Food Insecurity    Within the past 12 months, you worried that your food would run out before you got the money to buy more. Never true     Within the past 12 months, the food you bought just didn't last and you didn't have money to get more. Never true        Stress    Do you feel stress - tense, restless, nervous, or anxious, or unable to sleep at night because your mind is troubled all the time - these days? Not at all        Social Isolation    How often do you feel lonely or isolated from those around you?  Never        Alcohol Use    Q1: How often do you have a drink containing alcohol? Never     Q2: How many drinks containing alcohol do you have on a typical day when you are drinking? Patient does not drink     Q3: How often do you have six or more drinks on one occasion? Never        Utilities    In the past 12 months has the electric, gas, oil, or water company threatened to shut off services in your home? No        Health Literacy    How often do you need to have someone help you when you read instructions, pamphlets, or other written material from your doctor or pharmacy? Never                      Spoke with patient, spouse and son-in-law at bedside to complete d/c planning assessment. Patient lives with her spouse in a single story home with one step to enter. Patient is Independent with [employer]. Home DME includes a wheelchair, walker and cane. Verified PCP, Pharmacy and health insurance. Patient to OR on 11/26 for IM valeri insertion. PT/OT eval to take place on 11/27/24. Anticipate d/c to a facility on 11/29/24. Will continue to follow for needs. Discharge Plan A and Plan B have been determined by review of patient's clinical status, future medical and therapeutic needs, and coverage/benefits for post-acute care in coordination with multidisciplinary team  members.      Alcira Hurtado RNCM  Case Management  [facility]  [phone]

## 2024-11-25 NOTE — ASSESSMENT & PLAN NOTE
- CT C spine: No acute cervical fracture. 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   - consider OP v IP nsgy  - MM pain control

## 2024-11-25 NOTE — ED NOTES
Assumed care of the patient. Report received from Jennifer Lee RN. Pt on continuous cardiac monitoring, continuous pulse oximetry, and automatic BP cuff cycling Q30min. Pt in hospital gown, side rails up X2, bed low and locked, and call light is placed within reach. 2 family/visitors at bedside at this time. Pt complaining of nausea and R hip pain. Pt arrives in a c-collar and with pelvic binder applied from EMS.

## 2024-11-25 NOTE — HPI
Sussy Costa is a 89 y.o. female with PMH of PMH of L IT fx s/p CMN on 3/31 with Dr. Walker, adenocarcinoma of the right colon s/p right colectomy with Dr. Holman on 10/25/22, asthma, T2DM (A1c 5.9), HLD, HTN, CKD stage 3, pancreatic insufficiency, Asthma, Afib and osteoporosis on Prolia presenting with right hip pain. Patient was walking to the bathroom this morning and fell onto right hip. Immediate pain and difficulty bearing weight. Patient is unsure if she hit her head. She thinks she lost consciousness which caused her to fall.  states she was unresponsive for the first 30 seconds after the fall.  She is not currently on any blood thinners. She did hit her right forearm on the dresser while falling. She has a laceration present over dorsal distal forearm that was closed by the ED. Denies numbness, tingling, or paresthesias to the RLE or RUE. Lives with her . Uses a cane while ambulating outside of the house. Community ambulator, able to walk around grocery store.

## 2024-11-25 NOTE — ASSESSMENT & PLAN NOTE
Patient has paroxysmal (<7 days) atrial fibrillation. Patient is currently in sinus rhythm. [id number] Score: 4. The patients heart rate in the last 24 hours is as follows:  Pulse  Min: 75  Max: 89     Antiarrhythmics       Anticoagulants       Plan  - Replete lytes with a goal of K>4, Mg >2  - Patient's afib is currently controlled

## 2024-11-25 NOTE — ASSESSMENT & PLAN NOTE
"Patient's FSGs are controlled on current medication regimen.  Last A1c reviewed-   Lab Results   Component Value Date    HGBA1C 5.7 (H) 09/16/2024     Most recent fingerstick glucose reviewed- No results for input(s): "POCTGLUCOSE" in the last 24 hours.  Current correctional scale  Medium  Maintain anti-hyperglycemic dose as follows-   Antihyperglycemics (From admission, onward)      Start     Stop Route Frequency Ordered    11/25/24 1057  insulin aspart U-100 pen 0-10 Units         -- SubQ Before meals & nightly PRN 11/25/24 1000          Hold Oral hypoglycemics while patient is in the hospital.  "

## 2024-11-25 NOTE — ASSESSMENT & PLAN NOTE
Patient's most recent potassium results are listed below.   Recent Labs     11/25/24  0722   K 3.3*     Plan  - Replete potassium per protocol  - Monitor potassium Daily  - Patient's hypokalemia is stable

## 2024-11-25 NOTE — ASSESSMENT & PLAN NOTE
Osteoporosis with current pathological fracture   Senile osteoporosis     - HDS and afebrile  - XR b/l hips: Acute fracture of the right hip.  - CTH: No acute intracranial hemorrhage.   - CT C spine: Broad central disc herniation with stable extrusion and superior migration fine the C5 vertebral body flattens the traversing cord with presumably some encroachment on the exiting right C6 nerve root. No acute cervical fracture.   - MM pain regimen tylenol 1 g q8h, oxy 5 mg q3h prn, robaxin 500 mg q6h prn  - ortho consulted  - NPO  - fall precautions

## 2024-11-25 NOTE — PLAN OF CARE
Patient with fall with right IT fx found on admit to the ER for further work up. Known to myself from admit in April 2024 for left IT fx.  Hx of CKD 3- baseline Cr 1.2 to 1.7, type 2 Dm A1c 7.4 on insulin, HTN, paroxysmal atrial fibrillation, asthma (Sees pulm on trelegy inhaler), pancreatic insufficincy/cyst, vitamin D deficiency, mild thrombocytopenia, bronchal adenima, hx of colon cancer, osteoporosis on prolia with osteopersosis associated fracture now.  Wbc 18 with CXR wnl and UA pending, possibly reactive. Hg 11  K 3.3 and replacement.  BP 170s-220s and home meds resumed + hdyralazine PRN.  BNp 267. Troponin wnl.   CT head ok. C spine with disc herniation C5 without any symptoms per ER MD Dr sepulveda and likely chronic finding without any acute needs for this given asymptomatic.  Glucose at goal now and NPO for possible OR so hold scheduled insulin with slidign scale for now- per my previous notes from prev admit had hyperglycemia issues post op from steroids given for intubation so will need to be cognizant of that as wel as last admit had hypotension immediately post op with then rebound very high BPs in days after surgery so will need to watch this closely as well once surgical plan in place now as ortho consult pending.  Hip fx pathway started now.  Trilegy not on formulary here so held, nebs PRN.  Per most recent endocrine note from September 2024- regimen for DM:  Medication Changes:   Jardiance 10 mg daily  Lantus 5 units in AM and 5 units in the PM   Novolog 3-5-4 units before meals   Add correction scale if needed.   Blood sugar 180 to 230 add 1 units   Blood sugar 231 to 280 add 2 units   Blood sugar greater than 281 add 3 units     Patient with RCRi of 1 with insulin treatment with 6% 30 day risk of stroke, MI, cardiac arrest, and benefit outweighs risk and rec proceeding with surgery.    Eliqusi post op for 35 days for dvt ppx, end date 1/1/25 if OR is tomorrow, will f/u ortho plans and if OR is  tomorrow then will add DM diet =/- insulin pending trends    Full note by Lay Vivar PA-C pending

## 2024-11-25 NOTE — ED NOTES
Assumed care for pt after recieving report from nightshift RN. Pt. resting in bed in NAD, RR e/u. Vital signs stable and within desired limits at this time of assessment. Pt. offered bathroom assistance and denies need at this time. Explanation of care/wait provided. Pt verbalizes no needs at this time. Bed in low, locked position with rails up and call bell in reach. Pt's white board updated with today's care team and plan.     Patient identifiers for Sussy Costa 89 y.o. female checked and correct.  Chief Complaint   Patient presents with    Fall     Fall. Per ems pt fell walking to restroom. Obvious shortening to right leg with outward rotation     Past Medical History:   Diagnosis Date    Asthma     Cyst of pancreas     liver    Diabetes mellitus type II     Eczema of hand     Glaucoma     History of colon cancer 10/28/2022    Right sided colon cancer diagnosed in setting of LBO requiring urgent hemicolectomy 10/25/2022 with path showing pT4aN0 disease. CT chest 12/7/22 with possible lung metastases and MR liver with indeterminate liver lesions. 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 Patient elected for surveillance.       History of recurrent pneumonia 09/28/2020    Hyperlipidemia     Hypertension     Lichen planus     gums    Osteoporosis     Other chronic pancreatitis 03/01/2023    Pancreas cyst 03/18/2016    Pulmonary nodules     Spinal stenosis of lumbar region 08/30/2018     Allergies reported:   Review of patient's allergies indicates:   Allergen Reactions    Aspirin Other (See Comments)     Asthma    TRIAD OF NASAL POLYPS, ASA  ALLERGY AND ASTHMA.        Aspirin Other (See Comments)    Nsaids (non-steroidal anti-inflammatory drug) Other (See Comments)     AVOID DUE TO TRIAD OF ASA ALLERGY, NASAL POLYPS,AND ASTHMA  AVOID DUE TO TRIAD OF ASA ALLERGY, NASAL POLYPS,AND ASTHMA    Penicillin       Other reaction(s): Rash    9/30/20: tolerates ceftriaxone    Penicillin g Other (See Comments)     Other reaction(s): Rash    9/30/20: tolerates ceftriaxone         LOC: Patient is awake, alert, and aware of environment with an appropriate affect. Patient is oriented x 4 and speaking appropriately.  APPEARANCE: Patient resting comfortably and in no acute distress. Patient is clean and well groomed, patient's clothing is properly fastened.  HEENT: Pt in c-collar placed by EMS. Small abrasions to   SKIN: The skin is warm and dry. Patient has normal skin turgor and moist mucus membranes.   MUSCULOSKELETAL: Patient is moving upper extremities well, skin tears noted to right forearm, dressing in wet to dry ABD pad and kerlex. Pictures updated in media. Right leg is shortened and internally rotated, pt arrives in sheet papoose/ pelvic binding by EMS. Pedal pulses intact in both lower extremities.   RESPIRATORY: Airway is open and patent. Respirations are spontaneous and non-labored with normal effort and rate.  CARDIAC: Patient has a normal rate and rhythm. 75 on cardiac monitor. No peripheral edema noted. Denies chest pain and SOB at at time of assessment.   ABDOMEN: No distention noted. Soft and non-tender upon palpation.  NEUROLOGICAL: pupils 3mm, PERRL. Facial expression is symmetrical. Hand grasps are equal bilaterally. Normal sensation in all extremities when touched with finger.

## 2024-11-25 NOTE — ASSESSMENT & PLAN NOTE
Patients blood pressure range in the last 24 hours was: BP  Min: 153/103  Max: 228/105.The patient's inpatient anti-hypertensive regimen is listed below:  Current Antihypertensives  losartan tablet 100 mg, Daily, Oral  hydrALAZINE tablet 50 mg, Every 8 hours PRN, Oral    Plan  - BP is controlled, no changes needed to their regimen

## 2024-11-25 NOTE — ASSESSMENT & PLAN NOTE
Anemia is likely due to chronic disease due to Chronic Kidney Disease. Most recent hemoglobin and hematocrit are listed below.  Recent Labs     11/25/24  0722   HGB 11.2*   HCT 35.5*     Plan  - Monitor serial CBC: Daily  - Transfuse PRBC if patient becomes hemodynamically unstable, symptomatic or H/H drops below 7/21.  - Patient has not received any PRBC transfusions to date  - Patient's anemia is currently improving from baseline

## 2024-11-25 NOTE — SUBJECTIVE & OBJECTIVE
Past Medical History:   Diagnosis Date    Asthma     Cyst of pancreas     liver    Diabetes mellitus type II     Eczema of hand     Glaucoma     History of colon cancer 10/28/2022    Right sided colon cancer diagnosed in setting of LBO requiring urgent hemicolectomy 10/25/2022 with path showing pT4aN0 disease. CT chest 12/7/22 with possible lung metastases and MR liver with indeterminate liver lesions. 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 Patient elected for surveillance.       History of recurrent pneumonia 09/28/2020    Hyperlipidemia     Hypertension     Lichen planus     gums    Osteoporosis     Other chronic pancreatitis 03/01/2023    Pancreas cyst 03/18/2016    Pulmonary nodules     Spinal stenosis of lumbar region 08/30/2018       Past Surgical History:   Procedure Laterality Date    BRONCHOSCOPY N/A 05/13/2022    Procedure: Bronchoscopy;  Surgeon: [facility] Diagnostic Provider;  [facility];  Service: Anesthesiology;  Laterality: N/A;    CATARACT EXTRACTION, BILATERAL      CHOLECYSTECTOMY      COLECTOMY, RIGHT Right 10/25/2022    Procedure: COLECTOMY, RIGHT;  Surgeon: Jass Holman MD;  [facility];  Service: Colon and Rectal;  Laterality: Right;    COLONOSCOPY N/A 06/26/2023    Procedure: COLONOSCOPY;  Surgeon: Jass Holman MD;  Location: [facility] ENDO (2ND FLR);  Service: Endoscopy;  Laterality: N/A;  2nd floor -lung disease  referral Dr [id number] portal/mailed-GT  6/20/23- Precall confirmed- [place]    ENDOSCOPIC ULTRASOUND OF UPPER GASTROINTESTINAL TRACT N/A 04/22/2022    Procedure: ULTRASOUND, UPPER GI TRACT, ENDOSCOPIC;  Surgeon: Max Rosado MD;  [facility] ([facility]  Service: Endoscopy;  Laterality: N/A;  urgent EUS (linear) for abnormal CT scan with dilated PD and increased cyst of pancreas cyst.  pap 44 mmHg  4/13:fully vaccinated. instructions via  portal-SC    EPIDURAL STEROID INJECTION INTO LUMBAR SPINE N/A 11/08/2018    Procedure: Injection-steroid-epidural-lumbar L5-S1;  Surgeon: Nicci Osorio Jr., MD;  Location: [facility] PAIN MGT;  Service: Pain Management;  Laterality: N/A;    FUNCTIONAL ENDOSCOPIC SINUS SURGERY (FESS) USING COMPUTER-ASSISTED NAVIGATION N/A 06/17/2019    Procedure: FESS, USING COMPUTER-ASSISTED NAVIGATION;  Surgeon: Rosangela Isabel MD;  Location: [facility] OR;  Service: ENT;  Laterality: N/A;  video    HIP SURGERY Left 04/2024    HYSTERECTOMY      INTRAMEDULLARY RODDING OF FEMUR Left 03/31/2024    Procedure: INSERTION, INTRAMEDULLARY CAYDEN, FEMUR - Left,;  Surgeon: Nakul Walker MD;  Location: [address];  Service: Orthopedics;  Laterality: Left;    nasal polyps         Review of patient's allergies indicates:   Allergen Reactions    Aspirin Other (See Comments)     Asthma    TRIAD OF NASAL POLYPS, ASA  ALLERGY AND ASTHMA.        Aspirin Other (See Comments)    Nsaids (non-steroidal anti-inflammatory drug) Other (See Comments)     AVOID DUE TO TRIAD OF ASA ALLERGY, NASAL POLYPS,AND ASTHMA  AVOID DUE TO TRIAD OF ASA ALLERGY, NASAL POLYPS,AND ASTHMA    Penicillin      Other reaction(s): Rash    9/30/20: tolerates ceftriaxone    Penicillin g Other (See Comments)     Other reaction(s): Rash    9/30/20: tolerates ceftriaxone       Current Facility-Administered Medications   Medication    acetaminophen tablet 1,000 mg    artificial tears 0.5 % ophthalmic solution 1 drop    atorvastatin tablet 20 mg    bisacodyL suppository 10 mg    dextrose 10% bolus 125 mL 125 mL    dextrose 10% bolus 250 mL 250 mL    droPERidol injection 0.625 mg    glucagon (human recombinant) injection 1 mg    glucose chewable tablet 16 g    glucose chewable tablet 24 g    hydrALAZINE tablet 50 mg    insulin aspart U-100 pen 0-10 Units    latanoprost 0.005 % ophthalmic solution 1 drop    lipase-protease-amylase 24,000-76,000-120,000 units capsule 2 capsule     "losartan tablet 100 mg    [START ON 11/26/2024] magnesium oxide tablet 400 mg    melatonin tablet 6 mg    methocarbamoL tablet 500 mg    montelukast tablet 10 mg    morphine injection 2 mg    morphine injection 2 mg    ondansetron injection 4 mg    oxyCODONE immediate release tablet 5 mg    polyethylene glycol packet 17 g    potassium chloride SA CR tablet 20 mEq    prochlorperazine injection Soln 2.5 mg    sodium chloride 0.9% flush 10 mL    [START ON 11/26/2024] vitamin D 1000 units tablet 1,000 Units     Family History       Problem Relation (Age of Onset)    Heart disease Father, Brother    Hypertension Brother          Tobacco Use    Smoking status: Never     Passive exposure: Never    Smokeless tobacco: Never   Substance and Sexual Activity    Alcohol use: Yes     Comment: socially    Drug use: No    Sexual activity: Yes     Partners: Male     Birth control/protection: Post-menopausal     ROS  Constitutional: negative for fevers  Eyes: negative visual changes  ENT: negative for hearing loss  Respiratory: negative for dyspnea  Cardiovascular: negative for chest pain  Gastrointestinal: negative for abdominal pain  Genitourinary: negative for dysuria  Neurological: negative for headaches  Behavioral/Psych: negative for hallucinations  Endocrine: negative for temperature intolerance    Objective:     Vital Signs (Most Recent):  Temp: 97.9 °F (36.6 °C) (11/25/24 1238)  Pulse: (!) 59 (11/25/24 1358)  Resp: 16 (11/25/24 1358)  BP: (!) 183/77 (11/25/24 1358)  SpO2: 97 % (11/25/24 1358) Vital Signs (24h Range):  Temp:  [97.7 °F (36.5 °C)-97.9 °F (36.6 °C)] 97.9 °F (36.6 °C)  Pulse:  [55-95] 59  Resp:  [16-22] 16  SpO2:  [97 %-100 %] 97 %  BP: (153-228)/() 183/77     Weight: 53 kg (116 lb 13.5 oz)  Height: 5' 5" (165.1 cm)  Body mass index is 19.44 kg/m².      Intake/Output Summary (Last 24 hours) at 11/25/2024 1419  Last data filed at 11/25/2024 1100  Gross per 24 hour   Intake --   Output 450 ml   Net -450 ml "        Ortho/SPM Exam  General:  no acute distress, appears stated age   Neuro: alert and oriented x3  Psych: normal mood  Head: normocephalic, atraumatic.  Eyes: no scleral icterus  Mouth: moist mucous membranes  Cardiovascular: extremities warm and well perfused  Lungs: breathing comfortably, equal chest rise bilat  Skin: clean, dry, intact (any exceptions noted in below musculoskeletal exam)    MSK:  RUE:  - 2 skin tears and one laceration present over dorsal forearm, laceration closed with prolene sutures  - No swelling  - Scattered ecchymosis  - NonTTP throughout  - AROM and PROM of the shoulder, elbow, wrist, and hand intact without pain  - Axillary/AIN/PIN/Radial/Median/Ulnar Nerves assessed in isolation without deficit  - SILT throughout  - Compartments soft  - Radial artery palpated   - Capillary Refill <3s    LUE:  - Skin intact throughout, no open wounds  - No swelling  - No ecchymosis, erythema, or signs of cellulitis  - NonTTP throughout  - AROM and PROM of the shoulder, elbow, wrist, and hand intact without pain  - Axillary/AIN/PIN/Radial/Median/Ulnar Nerves assessed in isolation without deficit  - SILT throughout  - Compartments soft  - Radial artery palpated   - Capillary Refill <3s    RLE:  - Skin intact throughout, no scars present  - No swelling  - No ecchymosis, erythema, or signs of cellulitis  - TTP at hip/proximal femur  - No tenderness to palpation of middle or distal femur  - No tenderness to palpation of proximal, middle, or distal aspects of tibia or fibula  - No tenderness to palpation of foot  - Pain with any ROM at hip  - Full painless ROM of knee and ankle  - Compartments soft  - SILT Sa/Stephens/DP/SP/T  - Motor intact EHL/FHL/TA/Gastroc  - 2+ DP  - Brisk capillary refill       LLE:  - Skin intact throughout, no open wounds  - Previous incisions well healed  - No swelling  - No ecchymosis, erythema, or signs of cellulitis  - Mild TTP over lateral malleolus  - AROM and PROM of the hip, knee,  ankle, and foot intact without pain  - TA/EHL/Gastroc/FHL assessed in isolation without deficit  - SILT throughout  - Compartments soft  - DP palpated  - Capillary Refill <3s  - Negative Log roll  - Negative Stinchfield    Spine/pelvis/axial body:  No C spine tenderness  No pain with compression of pelvis  No chest wall or abdominal tenderness         Significant Labs: A1C:   Recent Labs   Lab 09/16/24  0945 11/25/24  1032   HGBA1C 5.7* 5.9*     CBC:   Recent Labs   Lab 11/25/24  0722   WBC 18.08*   HGB 11.2*   HCT 35.5*        CMP:   Recent Labs   Lab 11/25/24  0722      K 3.3*   *   CO2 20*   *   BUN 24*   CREATININE 1.0   CALCIUM 9.5   PROT 6.5   ALBUMIN 3.6   BILITOT 0.5   ALKPHOS 61   AST 29   ALT 29   ANIONGAP 9     Coagulation:   Recent Labs   Lab 11/25/24  0744   LABPROT 12.3   INR 1.1   APTT 25.2     Prealbumin:   Recent Labs   Lab 11/25/24  1032   PREALBUMIN 12*     Urine Studies:   Recent Labs   Lab 11/25/24  1101   COLORU Colorless*   APPEARANCEUA Clear   PHUR 6.0   SPECGRAV 1.010   PROTEINUA Negative   GLUCUA 4+*   KETONESU Negative   BILIRUBINUA Negative   OCCULTUA Negative   NITRITE Negative   LEUKOCYTESUR Negative   RBCUA 0   WBCUA 0   BACTERIA Rare   SQUAMEPITHEL 0     All pertinent labs within the past 24 hours have been reviewed.    Significant Imaging: I have reviewed and interpreted all pertinent imaging results/findings.  XR R hip: Comminuted and displaced intertrochanteric femur fracture

## 2024-11-25 NOTE — ED NOTES
Tele box applied to pt. Cristhian in war room states able to see pt on monitor, rhythm A-fib with HR 95.

## 2024-11-25 NOTE — PHARMACY MED REC
"Admission Medication History     The home medication history was taken by Heri Galvan.    You may go to "Admission" then "Reconcile Home Medications" tabs to review and/or act upon these items.     The home medication list has been updated by the Pharmacy department.   Please read ALL comments highlighted in yellow.   Please address this information as you see fit.    Feel free to contact us if you have any questions or require assistance.      The medications listed below were removed from the home medication list. Please reorder if appropriate:  Patient reports no longer taking the following medication(s):  TRIAMCINOLONE ACETONIDE 0.1 % CRM    Medications listed below were obtained from: Patient/family and Analytic software- [employer]  Current Outpatient Medications on File Prior to Encounter   Medication Sig    acetaminophen (TYLENOL) 500 MG tablet Take 2 tablets (1,000 mg total) by mouth every 8 (eight) hours as needed for Pain.    ascorbic acid, vitamin C, (VITAMIN C) 500 MG tablet Take 500 mg by mouth once daily.    atorvastatin (LIPITOR) 20 MG tablet Take 1 tablet (20 mg total) by mouth every evening.    calcium carbonate (TUMS ORAL) Take 2 tablets by mouth daily as needed (Heartburn).    denosumab (PROLIA) 60 mg/mL Syrg Inject 1 mL (60 mg total) into the skin every 6 (six) months.     empagliflozin (JARDIANCE) 10 mg tablet Take 1 tablet (10 mg total) by mouth once daily.    ferrous sulfate (IRON ORAL) Take 1 tablet by mouth every other day.    fluticasone-umeclidin-vilanter (TRELEGY ELLIPTA) 200-62.5-25 mcg inhaler Inhale 1 puff into the lungs once daily.    insulin aspart U-100 (NOVOLOG FLEXPEN U-100 INSULIN) 100 unit/mL (3 mL) InPn pen Inject 3 units into skin every morning, 5 units at lunch & 4 units at supper.    insulin glargine U-100, Lantus, (LANTUS SOLOSTAR U-100 INSULIN) 100 unit/mL (3 mL) InPn pen Inject 5 Units into the skin once daily AND 5 Units every evening.    latanoprost 0.005 % ophthalmic " "solution Place 1 drop into both eyes every evening.    lipase-protease-amylase 24,000-76,000-120,000 units (CREON) 24,000-76,000 -120,000 unit capsule Take 2 capsules with meals and 1 capsule with snacks.    losartan (COZAAR) 100 MG tablet Take 1 tablet (100 mg total) by mouth once daily.    magnesium oxide (MAG-OX) 400 mg (241.3 mg magnesium) tablet Take 400 mg by mouth 2 (two) times daily.    montelukast (SINGULAIR) 10 mg tablet Take 1 tablet (10 mg total) by mouth every evening.    olopatadine (PATANOL) 0.1 % ophthalmic solution Place 1 drop into both eyes 2 (two) times daily as needed for Allergies. 1 Drops Ophthalmic Twice a day    senna-docusate 8.6-50 mg (PERICOLACE) 8.6-50 mg per tablet Take 1 tablet by mouth every other day.    UNABLE TO FIND BUDESONIDE 0.8 MG CAPS - Empty contents of 1 capsule into nasal irrigation system, add distilled water and salt pack then mix & irrigate once daily.    vitamin D (VITAMIN D3) 1000 units Tab Take 1,000 Units by mouth once daily.    ACCU-CHEK GUIDE TEST STRIPS Strp Test four times daily with meals and nightly    BD ULTRA-FINE SHORT PEN NEEDLE 31 gauge x 5/16" Ndle USE with insulin pen 5 TIMES A DAY    lancets (ACCU-CHEK SOFTCLIX LANCETS) Misc 1 each by Misc.(Non-Drug; Combo Route) route 4 (four) times daily.                 Heri Galvan  EXT [id number]                  .          "

## 2024-11-25 NOTE — ED TRIAGE NOTES
Susys JD Costa, a 89 y.o. female presents to the ED w/ complaint of fall on right side while trying to get up for restroom.    Triage note:  Chief Complaint   Patient presents with    Fall     Fall. Per ems pt fell walking to restroom. Obvious shortening to right leg with outward rotation     Review of patient's allergies indicates:   Allergen Reactions    Aspirin Other (See Comments)     Asthma    TRIAD OF NASAL POLYPS, ASA  ALLERGY AND ASTHMA.        Aspirin Other (See Comments)    Nsaids (non-steroidal anti-inflammatory drug) Other (See Comments)     AVOID DUE TO TRIAD OF ASA ALLERGY, NASAL POLYPS,AND ASTHMA  AVOID DUE TO TRIAD OF ASA ALLERGY, NASAL POLYPS,AND ASTHMA    Penicillin      Other reaction(s): Rash    9/30/20: tolerates ceftriaxone    Penicillin g Other (See Comments)     Other reaction(s): Rash    9/30/20: tolerates ceftriaxone     Past Medical History:   Diagnosis Date    Asthma     Cyst of pancreas     liver    Diabetes mellitus type II     Eczema of hand     Glaucoma     History of colon cancer 10/28/2022    Right sided colon cancer diagnosed in setting of LBO requiring urgent hemicolectomy 10/25/2022 with path showing pT4aN0 disease. CT chest 12/7/22 with possible lung metastases and MR liver with indeterminate liver lesions. 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 Patient elected for surveillance.       History of recurrent pneumonia 09/28/2020    Hyperlipidemia     Hypertension     Lichen planus     gums    Osteoporosis     Other chronic pancreatitis 03/01/2023    Pancreas cyst 03/18/2016    Pulmonary nodules     Spinal stenosis of lumbar region 08/30/2018      No

## 2024-11-25 NOTE — H&P
[facility] - Emergency Dept  [facility] Medicine  History & Physical    Patient Name: Sussy Costa  MRN: [id number]  Patient Class: IP- Inpatient  Admission Date: 11/25/2024  Attending Physician: Glenny Layton MD   Primary Care Provider: Leon Ramírez MD         Patient information was obtained from patient, spouse/SO, relative(s), past medical records, and ER records.     Subjective:     Principal Problem:Closed intertrochanteric fracture of right femur    Chief Complaint:   Chief Complaint   Patient presents with    Fall     Fall. Per ems pt fell walking to restroom. Obvious shortening to right leg with outward rotation        HPI: Ms. Sussy Costa is an 89-year-old female w/ a h/o left intertrochanteric fracture s/p surgery 04/2024, Type 2 DM, CKD stage 3, HTN, pancreatic insufficiency, Asthma, Afib that presents for evaluation of hip pain. She was getting up to walk to the bathroom this morning an hour prior to arrival, took 2 steps and then felt herself falling. She fell onto her right side and attempted to grab her dresser and scraped her arm. She is unsure if she hit her head or had a syncopal event before falling. She denies any preceding chest pain, palpitations, lightheadedness or dizziness. She reports 8/10 right-sided hip pain that extends to mid thigh. She reports minimal blood loss from her arm. Bleeding was controlled with a dressing. She has not eaten since dinner at 6:00 pm yesterday. Sip of water at 11:00 p.m. yesterday for meds. She denied any chest pain, shortness of breath, abdominal pain. She did not take her medications this morning.     In ED, Pt hypertensive 228/105, otherwise AFVSS on RA. WBC 18, hgb 11.2. K 3.3. . Trop 0.022. XR b/l hips: Acute fracture of the right hip. CTH: No acute intracranial hemorrhage. CT C spine: Broad central disc herniation with stable extrusion and superior migration fine the C5 vertebral body flattens the traversing cord with presumably some  encroachment on the exiting right C6 nerve root. No acute cervical fracture. Given IV morphine, IV antiemetics. RUE lac repaired.     Past Medical History:   Diagnosis Date    Asthma     Cyst of pancreas     liver    Diabetes mellitus type II     Eczema of hand     Glaucoma     History of colon cancer 10/28/2022    Right sided colon cancer diagnosed in setting of LBO requiring urgent hemicolectomy 10/25/2022 with path showing [id number] disease. CT chest 12/7/22 with possible lung metastases and MR liver with indeterminate liver lesions. 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 Patient elected for surveillance.       History of recurrent pneumonia 09/28/2020    Hyperlipidemia     Hypertension     Lichen planus     gums    Osteoporosis     Other chronic pancreatitis 03/01/2023    Pancreas cyst 03/18/2016    Pulmonary nodules     Spinal stenosis of lumbar region 08/30/2018       Past Surgical History:   Procedure Laterality Date    BRONCHOSCOPY N/A 05/13/2022    Procedure: Bronchoscopy;  Surgeon: [facility] Diagnostic Provider;  Location: [facility] OR [address] [place] [address];  Service: Anesthesiology;  Laterality: N/A;    CATARACT EXTRACTION, BILATERAL      CHOLECYSTECTOMY      COLECTOMY, RIGHT Right 10/25/2022    Procedure: COLECTOMY, RIGHT;  Surgeon: Jass Holman MD;  [facility];  Service: Colon and Rectal;  Laterality: Right;    COLONOSCOPY N/A 06/26/2023    Procedure: COLONOSCOPY;  Surgeon: Jass Holman MD;  [facility]  Service: Endoscopy;  Laterality: N/A;  2nd floor -lung disease  referral Dr [id number]-[id number] portal/mailed-GT  6/20/23- Precall confirmed- [place]    ENDOSCOPIC ULTRASOUND OF UPPER GASTROINTESTINAL TRACT N/A 04/22/2022    Procedure: ULTRASOUND, UPPER GI TRACT, ENDOSCOPIC;  Surgeon: Max Rosado MD;  [facility]  Service: Endoscopy;  Laterality: N/A;  urgent EUS (linear)  for abnormal CT scan with dilated PD and increased cyst of pancreas cyst.  pap 44 mmHg  4/13:fully vaccinated. instructions via portal-SC    EPIDURAL STEROID INJECTION INTO LUMBAR SPINE N/A 11/08/2018    Procedure: Injection-steroid-epidural-lumbar L5-S1;  Surgeon: Nicci Osorio Jr., MD;  Location: [facility] PAIN MGT;  Service: Pain Management;  Laterality: N/A;    FUNCTIONAL ENDOSCOPIC SINUS SURGERY (FESS) USING COMPUTER-ASSISTED NAVIGATION N/A 06/17/2019    Procedure: FESS, USING COMPUTER-ASSISTED NAVIGATION;  Surgeon: Rosangela Isabel MD;  Location: [facility] OR;  Service: ENT;  Laterality: N/A;  video    HIP SURGERY Left 04/2024    HYSTERECTOMY      INTRAMEDULLARY RODDING OF FEMUR Left 03/31/2024    Procedure: INSERTION, INTRAMEDULLARY CAYDEN, FEMUR - Left,;  Surgeon: Nakul Walker MD;  Location: [address];  Service: Orthopedics;  Laterality: Left;    nasal polyps         Review of patient's allergies indicates:   Allergen Reactions    Aspirin Other (See Comments)     Asthma    TRIAD OF NASAL POLYPS, ASA  ALLERGY AND ASTHMA.        Aspirin Other (See Comments)    Nsaids (non-steroidal anti-inflammatory drug) Other (See Comments)     AVOID DUE TO TRIAD OF ASA ALLERGY, NASAL POLYPS,AND ASTHMA  AVOID DUE TO TRIAD OF ASA ALLERGY, NASAL POLYPS,AND ASTHMA    Penicillin      Other reaction(s): Rash    9/30/20: tolerates ceftriaxone    Penicillin g Other (See Comments)     Other reaction(s): Rash    9/30/20: tolerates ceftriaxone       No current facility-administered medications on file prior to encounter.     Current Outpatient Medications on File Prior to Encounter   Medication Sig    ACCU-CHEK GUIDE TEST STRIPS Strp TEST FOUR TIMES DAILY WITH MEALS AND NIGHTLY    acetaminophen (TYLENOL) 500 MG tablet Take 2 tablets (1,000 mg total) by mouth every 8 (eight) hours.    ascorbic acid, vitamin C, (VITAMIN C) 500 MG tablet Take 500 mg by mouth once daily.    atorvastatin (LIPITOR) 20 MG tablet Take 1 tablet (20  "mg total) by mouth every evening.    BD ULTRA-FINE SHORT PEN NEEDLE 31 gauge x 5/16" Ndle USE with insulin pen 5 TIMES A DAY    denosumab (PROLIA) 60 mg/mL Syrg Inject 1 mL (60 mg total) into the skin every 6 (six) months. Hold at rehab    empagliflozin (JARDIANCE) 10 mg tablet Take 1 tablet (10 mg total) by mouth once daily.    fluticasone-umeclidin-vilanter (TRELEGY ELLIPTA) 200-62.5-25 mcg inhaler Inhale 1 puff into the lungs once daily.    insulin aspart U-100 (NOVOLOG FLEXPEN U-100 INSULIN) 100 unit/mL (3 mL) InPn pen Inject 5 Units into the skin 3 (three) times daily with meals.    insulin glargine U-100, Lantus, (LANTUS SOLOSTAR U-100 INSULIN) 100 unit/mL (3 mL) InPn pen Inject 6 Units into the skin once daily AND 5 Units every evening.    lancets (ACCU-CHEK SOFTCLIX LANCETS) Misc 1 each by Misc.(Non-Drug; Combo Route) route 4 (four) times daily.    latanoprost 0.005 % ophthalmic solution Inject into the eye. 1 Drops Ophthalmic Every evening    lipase-protease-amylase 24,000-76,000-120,000 units (CREON) 24,000-76,000 -120,000 unit capsule TAKE 2 CAPSULES WITH MEALS AND 1 CAPSULE WITH SNACKS.    losartan (COZAAR) 100 MG tablet Take 1 tablet (100 mg total) by mouth once daily.    magnesium oxide (MAG-OX) 400 mg (241.3 mg magnesium) tablet Take 400 mg by mouth once daily.    montelukast (SINGULAIR) 10 mg tablet Take 1 tablet (10 mg total) by mouth every evening.    olopatadine (PATANOL) 0.1 % ophthalmic solution Place 1 drop into both eyes 2 (two) times daily as needed for Allergies. 1 Drops Ophthalmic Twice a day    senna-docusate 8.6-50 mg (PERICOLACE) 8.6-50 mg per tablet Take 1 tablet by mouth 2 (two) times daily.    triamcinolone acetonide 0.1% (KENALOG) 0.1 % cream Apply topically 2 (two) times daily.    vitamin D (VITAMIN D3) 1000 units Tab Take 1,000 Units by mouth once daily.    [DISCONTINUED] risedronate (ACTONEL) 35 MG tablet Take 1 tablet (35 mg total) by mouth every 7 days.     Family History       " Problem Relation (Age of Onset)    Heart disease Father, Brother    Hypertension Brother          Tobacco Use    Smoking status: Never     Passive exposure: Never    Smokeless tobacco: Never   Substance and Sexual Activity    Alcohol use: Yes     Comment: socially    Drug use: No    Sexual activity: Yes     Partners: Male     Birth control/protection: Post-menopausal     Review of Systems   Constitutional:  Negative for activity change, chills, diaphoresis, fatigue and fever.   HENT:  Negative for congestion, rhinorrhea and sore throat.    Respiratory:  Negative for cough, chest tightness, shortness of breath and wheezing.    Cardiovascular:  Negative for chest pain, palpitations and leg swelling.   Gastrointestinal:  Positive for nausea and vomiting. Negative for abdominal distention, abdominal pain, blood in stool, constipation and diarrhea.   Genitourinary:  Negative for difficulty urinating, dysuria, frequency, hematuria and urgency.   Musculoskeletal:  Positive for arthralgias. Negative for back pain and neck stiffness.   Neurological:  Positive for syncope. Negative for dizziness, tremors, seizures, weakness, light-headedness, numbness and headaches.   Psychiatric/Behavioral:  Negative for agitation, confusion and hallucinations.      Objective:     Vital Signs (Most Recent):  Temp: 97.7 °F (36.5 °C) (11/25/24 0813)  Pulse: 89 (11/25/24 1046)  Resp: 20 (11/25/24 1046)  BP: (!) 158/85 (11/25/24 1046)  SpO2: 97 % (11/25/24 1046) Vital Signs (24h Range):  Temp:  [97.7 °F (36.5 °C)] 97.7 °F (36.5 °C)  Pulse:  [75-89] 89  Resp:  [18-22] 20  SpO2:  [97 %-100 %] 97 %  BP: (153-228)/() 158/85     Weight: 53 kg (116 lb 13.5 oz)  Body mass index is 19.44 kg/m².     Physical Exam  Vitals and nursing note reviewed.   Constitutional:       General: She is not in acute distress.     Appearance: She is well-developed. She is not diaphoretic.   HENT:      Head: Normocephalic and atraumatic.      Right Ear: External ear  normal.      Left Ear: External ear normal.      Nose: Nose normal. No congestion.      Mouth/Throat:      Pharynx: Oropharynx is clear.   Eyes:      General: No scleral icterus.     Extraocular Movements: Extraocular movements intact.   Cardiovascular:      Rate and Rhythm: Normal rate and regular rhythm.      Pulses: Normal pulses.      Heart sounds: Normal heart sounds. No murmur heard.  Pulmonary:      Effort: Pulmonary effort is normal. No respiratory distress.      Breath sounds: Normal breath sounds. No wheezing or rales.   Abdominal:      General: Bowel sounds are normal. There is no distension.      Palpations: Abdomen is soft.      Tenderness: There is no abdominal tenderness. There is no guarding or rebound.   Musculoskeletal:      Cervical back: Neck supple.      Right lower leg: No edema.      Left lower leg: No edema.      Comments: externally rotated and shortened right lower leg   Skin:     General: Skin is warm and dry.      Capillary Refill: Capillary refill takes less than 2 seconds.   Neurological:      General: No focal deficit present.      Mental Status: She is alert and oriented to person, place, and time. Mental status is at baseline.   Psychiatric:         Mood and Affect: Mood normal.         Behavior: Behavior normal.         Thought Content: Thought content normal.                Significant Labs: All pertinent labs within the past 24 hours have been reviewed.  CBC:   Recent Labs   Lab 11/25/24 0722   WBC 18.08*   HGB 11.2*   HCT 35.5*        CMP:   Recent Labs   Lab 11/25/24 0722      K 3.3*   *   CO2 20*   *   BUN 24*   CREATININE 1.0   CALCIUM 9.5   PROT 6.5   ALBUMIN 3.6   BILITOT 0.5   ALKPHOS 61   AST 29   ALT 29   ANIONGAP 9     Cardiac Markers:   Recent Labs   Lab 11/25/24 0722   *     Troponin:   Recent Labs   Lab 11/25/24 0722   TROPONINI 0.022       Significant Imaging: I have reviewed all pertinent imaging results/findings within the past  24 hours.  Imaging Results              CT Head Without Contrast (Final result)  Result time 11/25/24 08:40:10      Final result by Kristian Harmon MD (11/25/24 08:40:10)                   Impression:      No acute intracranial hemorrhage/injury.      Electronically signed by: Kristian Harmon  Date:    11/25/2024  Time:    08:40               Narrative:    EXAMINATION:  CT HEAD WITHOUT CONTRAST    CLINICAL HISTORY:  Head trauma, minor (Age >= 65y);    TECHNIQUE:  Low dose axial images were obtained through the head.  Coronal and sagittal reformations were also performed. Contrast was not administered.    COMPARISON:  None.    FINDINGS:  There is no evidence of hydrocephalus, mass effect, intracranial hemorrhage or acute territorial infarct.    Decreased attenuation in the periventricular white matter is nonspecific but may reflect moderate chronic small vessel ischemic change.    No acute calvarial fracture is identified.  There is opacification of the frontal and residual anterior ethmoid air cells with postsurgical changes within the paranasal sinuses.                                       CT Cervical Spine Without Contrast (Final result)  Result time 11/25/24 08:47:01      Final result by Kristian Harmon MD (11/25/24 08:47:01)                   Impression:      No acute cervical fracture.    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      Electronically signed by: Kristian Harmon  Date:    11/25/2024  Time:    08:47               Narrative:    EXAMINATION:  CT CERVICAL SPINE WITHOUT CONTRAST    CLINICAL HISTORY:  Neck trauma (Age >= 65y);    TECHNIQUE:  Low dose axial images, sagittal and coronal reformations were performed though the cervical spine.  Contrast was not administered.    COMPARISON:  None    FINDINGS:  There is no evidence of acute  cervical fracture.    The prevertebral soft tissues are unremarkable.    Degenerative changes are identified within the cervical spine.  At C5-6 there is a broad central disc herniation with extrusion and superior migration by in the C5 vertebral body that results in flattening of the traversing cord and encroachment on the exiting right C6 nerve root.    Atherosclerotic vascular calcifications are identified at the carotid bifurcations bilaterally.                                       X-Ray Hips Bilateral 2 View Incl AP Pelvis (Final result)  Result time 11/25/24 08:20:04      Final result by Johan Miles MD (11/25/24 08:20:04)                   Impression:      Acute fracture of the right hip.      Electronically signed by: Johan Miles MD  Date:    11/25/2024  Time:    08:20               Narrative:    EXAMINATION:  XR HIPS BILATERAL 2 VIEW INCL AP PELVIS    CLINICAL HISTORY:  Pain in unspecified hip    TECHNIQUE:  AP view of the pelvis and frogleg lateral views of both hips were performed.    COMPARISON:  None 04/29/2024.    FINDINGS:  There is an  acute  comminuted  inter trochanteric fracture identified involving the right hip.    Postoperative changes of internal fixation of the left hip and femur identified as before.  No bone destruction.                                     Assessment/Plan:     * Closed intertrochanteric fracture of right femur  Osteoporosis with current pathological fracture   Senile osteoporosis     - HDS and afebrile  - XR b/l hips: Acute fracture of the right hip.  - CTH: No acute intracranial hemorrhage.   - CT C spine: Broad central disc herniation with stable extrusion and superior migration fine the C5 vertebral body flattens the traversing cord with presumably some encroachment on the exiting right C6 nerve root. No acute cervical fracture.   - MM pain regimen tylenol 1 g q8h, oxy 5 mg q3h prn, robaxin 500 mg q6h prn  - ortho consulted  - NPO  - fall precautions     Syncope  -  . Trop 0.022  - ECG w/ Sinus rhythm with Premature atrial complexes   - echo ordered  - tele    Normocytic anemia  Anemia is likely due to chronic disease due to Chronic Kidney Disease. Most recent hemoglobin and hematocrit are listed below.  Recent Labs     11/25/24  0722   HGB 11.2*   HCT 35.5*     Plan  - Monitor serial CBC: Daily  - Transfuse PRBC if patient becomes hemodynamically unstable, symptomatic or H/H drops below 7/21.  - Patient has not received any PRBC transfusions to date  - Patient's anemia is currently improving from baseline    Abnormal computed tomography of cervical spine  - CT C spine: No acute cervical fracture. 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   - consider OP v IP nsgy  - MM pain control    Hypokalemia  Patient's most recent potassium results are listed below.   Recent Labs     11/25/24  0722   K 3.3*     Plan  - Replete potassium per protocol  - Monitor potassium Daily  - Patient's hypokalemia is stable    Leukocytosis  - likely stress induced in setting of acute fracture, no s/s infectious process at this time    Vitamin D insufficiency  - vit D supplement    Stage 3 chronic kidney disease  Creatine stable for now. BMP reviewed- noted Estimated Creatinine Clearance: 32.5 mL/min (based on SCr of 1 mg/dL). according to latest data. 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  Monitor UOP and serial BMP and adjust therapy as needed. Renally dose meds. Avoid nephrotoxic medications and procedures.    Paroxysmal atrial fibrillation  Patient has paroxysmal (<7 days) atrial fibrillation. Patient is currently in sinus rhythm. [id number] Score: 4. The patients heart rate in the last 24 hours is as follows:  Pulse  Min: 75  Max: 89     Antiarrhythmics       Anticoagulants       Plan  - Replete lytes with a goal of  "K>4, Mg >2  - Patient's afib is currently controlled      Pancreatic insufficiency  - continue creon tidwm    Asthma, moderate persistent  - duonebs prn, continue controller    Glaucoma  - continue latanoprost    Diabetes mellitus type II  Patient's FSGs are controlled on current medication regimen.  Last A1c reviewed-   Lab Results   Component Value Date    HGBA1C 5.7 (H) 09/16/2024     Most recent fingerstick glucose reviewed- No results for input(s): "POCTGLUCOSE" in the last 24 hours.  Current correctional scale  Medium  Maintain anti-hyperglycemic dose as follows-   Antihyperglycemics (From admission, onward)      Start     Stop Route Frequency Ordered    11/25/24 1057  insulin aspart U-100 pen 0-10 Units         -- SubQ Before meals & nightly PRN 11/25/24 1000          Hold Oral hypoglycemics while patient is in the hospital.    Primary hypertension  Patients blood pressure range in the last 24 hours was: BP  Min: 153/103  Max: 228/105.The patient's inpatient anti-hypertensive regimen is listed below:  Current Antihypertensives  losartan tablet 100 mg, Daily, Oral  hydrALAZINE tablet 50 mg, Every 8 hours PRN, Oral    Plan  - BP is controlled, no changes needed to their regimen      VTE Risk Mitigation (From admission, onward)           Ordered     IP VTE HIGH RISK PATIENT  Once         11/25/24 1000     Place sequential compression device  Until discontinued         11/25/24 1000                                    Lay Vivar PA-C  Department of Hospital Medicine  [facility] - Emergency Dept          "

## 2024-11-26 ENCOUNTER — ANESTHESIA (OUTPATIENT)
Dept: SURGERY | Facility: HOSPITAL | Age: 89
End: 2024-11-26
Payer: MEDICARE

## 2024-11-26 ENCOUNTER — PATIENT MESSAGE (OUTPATIENT)
Dept: ADMINISTRATIVE | Facility: HOSPITAL | Age: 89
End: 2024-11-26
Payer: MEDICARE

## 2024-11-26 PROBLEM — E87.20 METABOLIC ACIDOSIS: Status: ACTIVE | Noted: 2024-11-26

## 2024-11-26 PROBLEM — R11.0 NAUSEA: Status: ACTIVE | Noted: 2024-11-26

## 2024-11-26 PROBLEM — R42 POSTURAL DIZZINESS WITH PRESYNCOPE: Status: ACTIVE | Noted: 2024-11-25

## 2024-11-26 PROBLEM — E87.20 LACTIC ACIDOSIS: Status: ACTIVE | Noted: 2024-11-26

## 2024-11-26 LAB
ALLENS TEST: ABNORMAL
ANION GAP SERPL CALC-SCNC: 13 MMOL/L (ref 8–16)
ANION GAP SERPL CALC-SCNC: 15 MMOL/L (ref 8–16)
ANISOCYTOSIS BLD QL SMEAR: SLIGHT
ASCENDING AORTA: 3.04 CM
AV AREA BY CONTINUOUS VTI: 1.4 CM2
AV INDEX (PROSTH): 0.48
AV LVOT MEAN GRADIENT: 3 MMHG
AV LVOT PEAK GRADIENT: 6 MMHG
AV MEAN GRADIENT: 11.3 MMHG
AV PEAK GRADIENT: 23 MMHG
AV VALVE AREA BY VELOCITY RATIO: 1.6 CM²
AV VALVE AREA: 1.5 CM²
AV VELOCITY RATIO: 0.5
B-OH-BUTYR BLD STRIP-SCNC: 1.1 MMOL/L (ref 0–0.5)
BASOPHILS # BLD AUTO: 0.02 K/UL (ref 0–0.2)
BASOPHILS NFR BLD: 0.2 % (ref 0–1.9)
BSA FOR ECHO PROCEDURE: 1.56 M2
BUN SERPL-MCNC: 30 MG/DL (ref 8–23)
BUN SERPL-MCNC: 40 MG/DL (ref 8–23)
BURR CELLS BLD QL SMEAR: ABNORMAL
CALCIUM SERPL-MCNC: 7.9 MG/DL (ref 8.7–10.5)
CALCIUM SERPL-MCNC: 8 MG/DL (ref 8.7–10.5)
CHLORIDE SERPL-SCNC: 113 MMOL/L (ref 95–110)
CHLORIDE SERPL-SCNC: 115 MMOL/L (ref 95–110)
CO2 SERPL-SCNC: 13 MMOL/L (ref 23–29)
CO2 SERPL-SCNC: 16 MMOL/L (ref 23–29)
CREAT SERPL-MCNC: 1.7 MG/DL (ref 0.5–1.4)
CREAT SERPL-MCNC: 1.9 MG/DL (ref 0.5–1.4)
CV ECHO LV RWT: 0.53 CM
DELSYS: ABNORMAL
DIFFERENTIAL METHOD BLD: ABNORMAL
DOP CALC AO PEAK VEL: 2.4 M/S
DOP CALC AO VTI: 39.6 CM
DOP CALC LVOT AREA: 3.1 CM2
DOP CALC LVOT DIAMETER: 2 CM
DOP CALC LVOT PEAK VEL: 1.2 M/S
DOP CALC LVOT STROKE VOLUME: 60.3 CM3
DOP CALCLVOT PEAK VEL VTI: 19.2 CM
E WAVE DECELERATION TIME: 270.56 MS
E/A RATIO: 0.81
E/E' RATIO: 25.33 M/S
ECHO EF ESTIMATED: 59 %
ECHO LV POSTERIOR WALL: 1 CM (ref 0.6–1.1)
EJECTION FRACTION: 68 %
EOSINOPHIL # BLD AUTO: 0 K/UL (ref 0–0.5)
EOSINOPHIL NFR BLD: 0 % (ref 0–8)
ERYTHROCYTE [DISTWIDTH] IN BLOOD BY AUTOMATED COUNT: 14.8 % (ref 11.5–14.5)
ERYTHROCYTE [SEDIMENTATION RATE] IN BLOOD BY WESTERGREN METHOD: 16 MM/H
EST. GFR  (NO RACE VARIABLE): 25.1 ML/MIN/1.73 M^2
EST. GFR  (NO RACE VARIABLE): 28.7 ML/MIN/1.73 M^2
FLOW: 1
FRACTIONAL SHORTENING: 31.6 % (ref 28–44)
GLUCOSE SERPL-MCNC: 219 MG/DL (ref 70–110)
GLUCOSE SERPL-MCNC: 318 MG/DL (ref 70–110)
HCO3 UR-SCNC: 18.9 MMOL/L (ref 24–28)
HCT VFR BLD AUTO: 23.7 % (ref 37–48.5)
HGB BLD-MCNC: 8 G/DL (ref 12–16)
HYPOCHROMIA BLD QL SMEAR: ABNORMAL
IMM GRANULOCYTES # BLD AUTO: 0.08 K/UL (ref 0–0.04)
IMM GRANULOCYTES NFR BLD AUTO: 0.7 % (ref 0–0.5)
INTERVENTRICULAR SEPTUM: 0.9 CM (ref 0.6–1.1)
LA MAJOR: 5.41 CM
LA MINOR: 5.63 CM
LA WIDTH: 2.71 CM
LACTATE SERPL-SCNC: 1.4 MMOL/L (ref 0.5–2.2)
LACTATE SERPL-SCNC: 2.5 MMOL/L (ref 0.5–2.2)
LACTATE SERPL-SCNC: 3.8 MMOL/L (ref 0.5–2.2)
LEFT ATRIUM SIZE: 2.27 CM
LEFT ATRIUM VOLUME INDEX MOD: 26.9 ML/M2
LEFT ATRIUM VOLUME INDEX: 18.4 ML/M2
LEFT ATRIUM VOLUME MOD: 42.25 ML
LEFT ATRIUM VOLUME: 28.85 CM3
LEFT INTERNAL DIMENSION IN SYSTOLE: 2.6 CM (ref 2.1–4)
LEFT VENTRICLE DIASTOLIC VOLUME INDEX: 40.19 ML/M2
LEFT VENTRICLE DIASTOLIC VOLUME: 63.1 ML
LEFT VENTRICLE MASS INDEX: 69.4 G/M2
LEFT VENTRICLE SYSTOLIC VOLUME INDEX: 16.3 ML/M2
LEFT VENTRICLE SYSTOLIC VOLUME: 25.63 ML
LEFT VENTRICULAR INTERNAL DIMENSION IN DIASTOLE: 3.8 CM (ref 3.5–6)
LEFT VENTRICULAR MASS: 109 G
LV LATERAL E/E' RATIO: 22.8
LV SEPTAL E/E' RATIO: 28.5
LYMPHOCYTES # BLD AUTO: 0.7 K/UL (ref 1–4.8)
LYMPHOCYTES NFR BLD: 6.8 % (ref 18–48)
MAGNESIUM SERPL-MCNC: 1.8 MG/DL (ref 1.6–2.6)
MCH RBC QN AUTO: 32 PG (ref 27–31)
MCHC RBC AUTO-ENTMCNC: 33.8 G/DL (ref 32–36)
MCV RBC AUTO: 95 FL (ref 82–98)
MODE: ABNORMAL
MONOCYTES # BLD AUTO: 0.5 K/UL (ref 0.3–1)
MONOCYTES NFR BLD: 4.7 % (ref 4–15)
MPV, BLUE TOP: 11 FL (ref 9.2–12.9)
MV MEAN GRADIENT: 5 MMHG
MV PEAK A VEL: 1.4 M/S
MV PEAK E VEL: 1.14 M/S
MV PEAK GRADIENT: 4 MMHG
MV STENOSIS PRESSURE HALF TIME: 107 MS
MV VALVE AREA P 1/2 METHOD: 2.06 CM2
NEUTROPHILS # BLD AUTO: 9.5 K/UL (ref 1.8–7.7)
NEUTROPHILS NFR BLD: 87.6 % (ref 38–73)
NRBC BLD-RTO: 0 /100 WBC
OHS CV RV/LV RATIO: 0.68 CM
OVALOCYTES BLD QL SMEAR: ABNORMAL
PCO2 BLDA: 34 MMHG (ref 35–45)
PH SMN: 7.35 [PH] (ref 7.35–7.45)
PHOSPHATE SERPL-MCNC: 4.7 MG/DL (ref 2.7–4.5)
PISA TR MAX VEL: 2.84 M/S
PLATELET # BLD AUTO: ABNORMAL K/UL (ref 150–450)
PLATELET, BLUE TOP: 150 K/UL (ref 150–450)
PMV BLD AUTO: ABNORMAL FL (ref 9.2–12.9)
PO2 BLDA: 42 MMHG (ref 40–60)
POC BE: -7 MMOL/L
POC SATURATED O2: 76 % (ref 95–100)
POC TCO2: 20 MMOL/L (ref 24–29)
POCT GLUCOSE: 129 MG/DL (ref 70–110)
POCT GLUCOSE: 138 MG/DL (ref 70–110)
POCT GLUCOSE: 251 MG/DL (ref 70–110)
POCT GLUCOSE: 251 MG/DL (ref 70–110)
POCT GLUCOSE: 258 MG/DL (ref 70–110)
POCT GLUCOSE: 340 MG/DL (ref 70–110)
POCT GLUCOSE: 346 MG/DL (ref 70–110)
POCT GLUCOSE: 82 MG/DL (ref 70–110)
POIKILOCYTOSIS BLD QL SMEAR: SLIGHT
POLYCHROMASIA BLD QL SMEAR: ABNORMAL
POTASSIUM SERPL-SCNC: 3.7 MMOL/L (ref 3.5–5.1)
POTASSIUM SERPL-SCNC: 4.5 MMOL/L (ref 3.5–5.1)
RA MAJOR: 5 CM
RA PRESSURE ESTIMATED: 8 MMHG
RA WIDTH: 2.36 CM
RBC # BLD AUTO: 2.5 M/UL (ref 4–5.4)
RIGHT VENTRICLE DIASTOLIC BASEL DIMENSION: 2.6 CM
RV TB RVSP: 11 MMHG
SAMPLE: ABNORMAL
SINUS: 2.91 CM
SITE: ABNORMAL
SODIUM SERPL-SCNC: 142 MMOL/L (ref 136–145)
SODIUM SERPL-SCNC: 143 MMOL/L (ref 136–145)
SPHEROCYTES BLD QL SMEAR: ABNORMAL
STJ: 2.44 CM
TDI LATERAL: 0.05 M/S
TDI SEPTAL: 0.04 M/S
TDI: 0.05 M/S
TR MAX PG: 32 MMHG
TRICUSPID ANNULAR PLANE SYSTOLIC EXCURSION: 2.53 CM
TV PEAK GRADIENT: 32 MMHG
TV REST PULMONARY ARTERY PRESSURE: 40 MMHG
WBC # BLD AUTO: 10.81 K/UL (ref 3.9–12.7)
Z-SCORE OF LEFT VENTRICULAR DIMENSION IN END DIASTOLE: -1.72
Z-SCORE OF LEFT VENTRICULAR DIMENSION IN END SYSTOLE: -0.58

## 2024-11-26 PROCEDURE — 80048 BASIC METABOLIC PNL TOTAL CA: CPT | Performed by: HOSPITALIST

## 2024-11-26 PROCEDURE — 21400001 HC TELEMETRY ROOM

## 2024-11-26 PROCEDURE — 99222 1ST HOSP IP/OBS MODERATE 55: CPT | Mod: ,,, | Performed by: NURSE PRACTITIONER

## 2024-11-26 PROCEDURE — 36415 COLL VENOUS BLD VENIPUNCTURE: CPT | Performed by: HOSPITALIST

## 2024-11-26 PROCEDURE — 83605 ASSAY OF LACTIC ACID: CPT | Mod: 91 | Performed by: HOSPITALIST

## 2024-11-26 PROCEDURE — 25000003 PHARM REV CODE 250: Performed by: HOSPITALIST

## 2024-11-26 PROCEDURE — 84100 ASSAY OF PHOSPHORUS: CPT | Performed by: HOSPITALIST

## 2024-11-26 PROCEDURE — 83735 ASSAY OF MAGNESIUM: CPT | Performed by: HOSPITALIST

## 2024-11-26 PROCEDURE — 82010 KETONE BODYS QUAN: CPT | Performed by: HOSPITALIST

## 2024-11-26 PROCEDURE — 85025 COMPLETE CBC W/AUTO DIFF WBC: CPT | Performed by: HOSPITALIST

## 2024-11-26 PROCEDURE — 82803 BLOOD GASES ANY COMBINATION: CPT

## 2024-11-26 PROCEDURE — 99900035 HC TECH TIME PER 15 MIN (STAT)

## 2024-11-26 PROCEDURE — 25000003 PHARM REV CODE 250

## 2024-11-26 PROCEDURE — 85049 AUTOMATED PLATELET COUNT: CPT | Performed by: HOSPITALIST

## 2024-11-26 PROCEDURE — 63600175 PHARM REV CODE 636 W HCPCS: Performed by: HOSPITALIST

## 2024-11-26 RX ORDER — HYDRALAZINE HYDROCHLORIDE 25 MG/1
25 TABLET, FILM COATED ORAL EVERY 8 HOURS
Status: DISCONTINUED | OUTPATIENT
Start: 2024-11-26 | End: 2024-11-26

## 2024-11-26 RX ORDER — INSULIN ASPART 100 [IU]/ML
4 INJECTION, SOLUTION INTRAVENOUS; SUBCUTANEOUS
Status: DISCONTINUED | OUTPATIENT
Start: 2024-11-26 | End: 2024-11-27

## 2024-11-26 RX ORDER — INSULIN GLARGINE 100 [IU]/ML
10 INJECTION, SOLUTION SUBCUTANEOUS 2 TIMES DAILY
Status: DISCONTINUED | OUTPATIENT
Start: 2024-11-26 | End: 2024-11-26

## 2024-11-26 RX ORDER — PROCHLORPERAZINE EDISYLATE 5 MG/ML
5 INJECTION INTRAMUSCULAR; INTRAVENOUS EVERY 6 HOURS PRN
Status: DISCONTINUED | OUTPATIENT
Start: 2024-11-26 | End: 2024-11-29 | Stop reason: HOSPADM

## 2024-11-26 RX ORDER — SODIUM BICARBONATE 650 MG/1
1300 TABLET ORAL 3 TIMES DAILY
Status: DISCONTINUED | OUTPATIENT
Start: 2024-11-26 | End: 2024-11-29 | Stop reason: HOSPADM

## 2024-11-26 RX ORDER — INSULIN ASPART 100 [IU]/ML
7 INJECTION, SOLUTION INTRAVENOUS; SUBCUTANEOUS
Status: DISCONTINUED | OUTPATIENT
Start: 2024-11-26 | End: 2024-11-26

## 2024-11-26 RX ORDER — INSULIN GLARGINE 100 [IU]/ML
10 INJECTION, SOLUTION SUBCUTANEOUS DAILY
Status: DISCONTINUED | OUTPATIENT
Start: 2024-11-27 | End: 2024-11-27

## 2024-11-26 RX ORDER — DOCUSATE SODIUM 100 MG
400 CAPSULE ORAL
Status: COMPLETED | OUTPATIENT
Start: 2024-11-26 | End: 2024-11-26

## 2024-11-26 RX ORDER — SODIUM CHLORIDE 9 MG/ML
INJECTION, SOLUTION INTRAVENOUS CONTINUOUS
Status: ACTIVE | OUTPATIENT
Start: 2024-11-26 | End: 2024-11-26

## 2024-11-26 RX ADMIN — PANCRELIPASE 2 CAPSULE: 120000; 24000; 76000 CAPSULE, DELAYED RELEASE PELLETS ORAL at 12:11

## 2024-11-26 RX ADMIN — LATANOPROST 1 DROP: 50 SOLUTION OPHTHALMIC at 08:11

## 2024-11-26 RX ADMIN — SODIUM BICARBONATE 1300 MG: 650 TABLET ORAL at 05:11

## 2024-11-26 RX ADMIN — ONDANSETRON 4 MG: 2 INJECTION INTRAMUSCULAR; INTRAVENOUS at 10:11

## 2024-11-26 RX ADMIN — Medication 400 ML: at 04:11

## 2024-11-26 RX ADMIN — ACETAMINOPHEN 1000 MG: 500 TABLET ORAL at 12:11

## 2024-11-26 RX ADMIN — SODIUM BICARBONATE 1300 MG: 650 TABLET ORAL at 08:11

## 2024-11-26 RX ADMIN — Medication 400 MG: at 09:11

## 2024-11-26 RX ADMIN — APIXABAN 2.5 MG: 2.5 TABLET, FILM COATED ORAL at 09:11

## 2024-11-26 RX ADMIN — SENNOSIDES AND DOCUSATE SODIUM 1 TABLET: 50; 8.6 TABLET ORAL at 09:11

## 2024-11-26 RX ADMIN — INSULIN ASPART 8 UNITS: 100 INJECTION, SOLUTION INTRAVENOUS; SUBCUTANEOUS at 12:11

## 2024-11-26 RX ADMIN — PANCRELIPASE 2 CAPSULE: 120000; 24000; 76000 CAPSULE, DELAYED RELEASE PELLETS ORAL at 05:11

## 2024-11-26 RX ADMIN — Medication 400 ML: at 08:11

## 2024-11-26 RX ADMIN — SODIUM CHLORIDE 500 ML: 0.9 INJECTION, SOLUTION INTRAVENOUS at 04:11

## 2024-11-26 RX ADMIN — INSULIN ASPART 7 UNITS: 100 INJECTION, SOLUTION INTRAVENOUS; SUBCUTANEOUS at 12:11

## 2024-11-26 RX ADMIN — ONDANSETRON 4 MG: 2 INJECTION INTRAMUSCULAR; INTRAVENOUS at 05:11

## 2024-11-26 RX ADMIN — SODIUM CHLORIDE: 0.9 INJECTION, SOLUTION INTRAVENOUS at 11:11

## 2024-11-26 RX ADMIN — PANCRELIPASE 2 CAPSULE: 120000; 24000; 76000 CAPSULE, DELAYED RELEASE PELLETS ORAL at 08:11

## 2024-11-26 RX ADMIN — INSULIN GLARGINE 10 UNITS: 100 INJECTION, SOLUTION SUBCUTANEOUS at 09:11

## 2024-11-26 RX ADMIN — METHOCARBAMOL 500 MG: 500 TABLET ORAL at 06:11

## 2024-11-26 RX ADMIN — VANCOMYCIN HYDROCHLORIDE 1000 MG: 1 INJECTION, POWDER, LYOPHILIZED, FOR SOLUTION INTRAVENOUS at 06:11

## 2024-11-26 RX ADMIN — Medication 400 ML: at 12:11

## 2024-11-26 RX ADMIN — SENNOSIDES AND DOCUSATE SODIUM 1 TABLET: 50; 8.6 TABLET ORAL at 08:11

## 2024-11-26 RX ADMIN — ACETAMINOPHEN 1000 MG: 500 TABLET ORAL at 05:11

## 2024-11-26 RX ADMIN — METHOCARBAMOL 500 MG: 500 TABLET ORAL at 09:11

## 2024-11-26 RX ADMIN — ACETAMINOPHEN 1000 MG: 500 TABLET ORAL at 11:11

## 2024-11-26 RX ADMIN — ACETAMINOPHEN 1000 MG: 500 TABLET ORAL at 06:11

## 2024-11-26 RX ADMIN — HYDRALAZINE HYDROCHLORIDE 25 MG: 25 TABLET ORAL at 09:11

## 2024-11-26 RX ADMIN — ATORVASTATIN CALCIUM 20 MG: 20 TABLET, FILM COATED ORAL at 08:11

## 2024-11-26 RX ADMIN — MONTELUKAST 10 MG: 10 TABLET, FILM COATED ORAL at 08:11

## 2024-11-26 RX ADMIN — CHOLECALCIFEROL TAB 25 MCG (1000 UNIT) 1000 UNITS: 25 TAB at 09:11

## 2024-11-26 RX ADMIN — APIXABAN 2.5 MG: 2.5 TABLET, FILM COATED ORAL at 08:11

## 2024-11-26 RX ADMIN — INSULIN ASPART 8 UNITS: 100 INJECTION, SOLUTION INTRAVENOUS; SUBCUTANEOUS at 09:11

## 2024-11-26 RX ADMIN — INSULIN ASPART 7 UNITS: 100 INJECTION, SOLUTION INTRAVENOUS; SUBCUTANEOUS at 08:11

## 2024-11-26 NOTE — OP NOTE
OPERATIVE NOTE     DATE OF PROCEDURE:  11/25/2024     PREOPERATIVE DIAGNOSIS:           Right intertrochanteric hip fracture, closed, displaced, initial encounter  Fall from standing     POSTOPERATIVE DIAGNOSIS:         Right intertrochanteric hip fracture, closed, displaced, initial encounter  Fall from standing     PROCEDURE:              Open reduction internal fixation right intertrochanteric hip fracture with intramedullary nail     SURGEON:       Lorenzo Blunt MD     ASSISTANT:                 Denny Tran MD     ANESTHESIA:              General     EBL:                  350 mL     COMPLICATIONS:  none     IMPLANTS:       Eva Gamma 4, 11 x 360 mm  Compression screw, 85 mm  Distal interlocking screw, 5 mm, x2    SPECIMENS:    None     INDICATIONS FOR PROCEDURE:  89-year-old female  Syncope/fall 11/25/2024  Immediate right hip pain.  Inability to bear weight.  Brought to the emergency room.  Evaluated by orthopedic resident on-call team, hospitalist team.  Physical exam x-rays indicated an intertrochanteric hip fracture.  Admitted to the hospital for further care     At the time of my evaluation, patient complained of isolated pain in right hip, 7/10, sharp, stabbing, worse with motion, improved with rest.  No numbness and tingling.    Also with a superficial skin tear right forearm, addressed by the emergency department team     Lives at home with   Community ambulator with a cane   Recent IM nail left hip fracture March 20, 2024, ([facility])  Multiple medical problems to include colon cancer status post colectomy, no known Mets   asthma  diabetes, hemoglobin A1c 5.9  chronic kidney disease  Afib - not on anticoagulation  Osteoporosis, on bisphosphonates     Discussed diagnosis of intertrochanteric hip fracture with both patient and family members.  Discussed both non operative and operative management options.  Non operative management would consist of bed rest, protected weight bearing  and would likely result in a malunion/nonunion, prolonged pain, debility, and the medical comorbidities associated with prolonged bed rest/immobility.  Discussed operative intervention the form of open reduction internal fixation with intramedullary nail.  Hopefully this would improve pain, alignment, mobility, healing potential, overall outcome.     The risks, benefits, and alternatives to surgery were discussed with the patient and/or family.    Specific risks discussed included, but were not limited to:  Limb length discrepancy, malrotation, head perforation, avascular necrosis, hip arthritis, abductor pain, limp, gait difficulty, failure of hardware, damage to nearby structures, including neurovascular structures leading to loss of function or loss of limb, bleeding, need for blood transfusion, pain, stiffness, scarring, numbness, tingling, weakness, compartment syndrome, malunion/nonunion, hardware failure, hardware prominence, infection, need for multiple staged procedures, prolonged antibiotics, iatrogenic fracture, heterotopic ossification, arthritis, a variety of medical complications including but not limited to heart attack, stroke, deep venous thrombosis, pulmonary embolism, prolonged hospitalization, prolonged intubation, and death.   Patient and/or family expressed an understanding and desires to proceed with surgery.   All questions were answered.  No guarantees were implied or stated.  Informed consent was obtained.        OPERATIVE PROCEDURE:  Patient met in the preop hold area, the correct site and side of surgery being the right hip were marked and verified.  Regional block placed by Anesthesia.  Patient brought back to the operative suite.     General anesthesia smoothly induced.    Fracture boots were placed.  Patient was transferred over to operative table and placed in supine position.  Peroneal post was placed. Operative side arm was draped across the chest and well padded. All bony  prominences were appropriately padded.  We performed traction and reduction maneuvers, and were able to obtain a closed reduction of the fracture.  Right lower extremity lower extremity was prepped and draped in normal sterile fashion. Preoperative antibiotics of Ancef 2g were given. 1g IV TXA was given to aid in hemostasis     Time-out was performed verifying the correct patient, site/side of surgery, surgical consent, radiographs as applicable, preop antibiotics, necessary equipment, anticipated blood loss, length of procedure, postoperative disposition.     We began the case by marking anatomic landmarks on the patient. Incision was made proximal to the greater trochanter.  Fascia was incised. Awl was utilized. Guide pin was entered in a center center position confirmed on both AP and lateral fluoroscopic imaging.  It was advanced to the level of the lesser trochanter. Opening Reamer with appropriate soft tissue guide was utilized to open the canal.  Ball-tipped guidewire was inserted in an intramedullary location confirmed on fluoroscopic imaging.  This was advanced to the superior pole of patella. Nail was measured. We reamed once with a 12.5mm reamer to confirm that a 11 mm nail would fit.  Appropriate size/length nail was placed on the insertion jig and inserted into the intramedullary canal, confirmed on multiplanar fluoroscopic imaging.  This was inserted to the appropriate depth and ensured to not perforate anterior femoral cortex on lateral imaging.       We then used the outrigger guide and triple sleeve inserted through a lateral incision on the thigh to place a guide pin in appropriate center center position through the femoral neck and head.  We used fluoroscopic imaging to confirm our  pin placement.  This was appropriate tip apex distance.  We confirmed that the tip of the wire did not perforate femoral head.  We then used the lateral opening Reamer.  The appropriate size compression screw was then  placed with the appropriate tip apex distance on AP and lateral views. Traction removed.  Fracture was compressed through the guide.  Set screw tightened.  Set screw loosened quarter turn to allow compression.    We turned our attention to placement of distal interlocking screws.  We chose to place 2 distal interlocking screw to provide stability in axial loading and rotation.  2 screw were placed from lateral to medial using percutaneous stab incision and perfect [place] technique. We confirmed appropriate placement of the screws on both AP and lateral fluoroscopic imaging.      Final fluoroscopic imaging of the entire femur was taken in AP and lateral views confirming appropriate hardware placement fracture reduction.     Wounds were thoroughly irrigated with saline. Fascia was closed with 0 Vicryl.  Subcutaneous tissue closed with 2 O Vicryl. Skin closed with 3 Monocryl. Dermabond applied.  Aquacel dressing applied.     Prior to final closure all counts were confirmed to be correct.  Patient tolerated procedure well, was extubated, transferred to PACU for further recovery.     At the conclusion of the procedure the patient had soft and compressible compartments, brisk cap refill, palpable DP and PT pulse in the operative extremity.     POSTOPERATIVE PLAN:  89-year-old female, syncope/fall 11/25/2024  Right intertrochanteric hip fracture     11/25/2024 - IM nail right hip IT fracture     Antibiotics x 24 hr  eliquis x 4 weeks postop for DVT prophylaxis     Hospitalist comanagement  Multimodal pain management  Calcium, vitamin-D, boost  PT     Fragility fracture Clinic referral     WBAT right lower extremity  Right lower extremity, range of motion as tolerated     X-ray AP pelvis and right femur AP lateral views at subsequent followups     Follow-up postop 2 weeks, 6 weeks, 3 months, 6 months, 1 year           =====================  Lorenzo Blunt MD  Orthopaedic Surgery

## 2024-11-26 NOTE — ASSESSMENT & PLAN NOTE
Sussy Costa is a 89 y.o. female with right IT femur fracture, closed, NVI. They take no anticoagulation at home. They required a cane prior to this injury.     Pain control: multimodal  PT/OT: WBAT RLE  DVT PPx: Eliquis 2.5 mg BID, SCDs at all times when not ambulating  Abx: postop Ancef  Labs: Hb 8.0, Cr 1.7. Encourage hydration, will continue to monitor.  Drain: none  Guevara: remove POD1    Dispo: f/u PT recs, likely dc to SNF

## 2024-11-26 NOTE — PT/OT/SLP PROGRESS
Physical Therapy      Patient Name:  Sussy Costa   MRN:  [id number]    Patient not seen today secondary to Nausea and vomiting this AM 1031 and ECHO when attempted 1108. Unable to perform 3rd attempt. Will follow-up when appropriate.    11/26/2024

## 2024-11-26 NOTE — ASSESSMENT & PLAN NOTE
Patient's FSGs are controlled on current medication regimen.  Last A1c reviewed-   Lab Results   Component Value Date    HGBA1C 5.9 (H) 11/25/2024     Most recent fingerstick glucose reviewed-   Recent Labs   Lab 11/26/24  0606 11/26/24  0826 11/26/24  1130 11/26/24  1143   POCTGLUCOSE 346* 340* 251* 258*     Current correctional scale  Medium  Maintain anti-hyperglycemic dose as follows-   Antihyperglycemics (From admission, onward)      Start     Stop Route Frequency Ordered    11/26/24 0900  insulin glargine U-100 (Lantus) pen 10 Units         -- SubQ 2 times daily 11/26/24 0657    11/26/24 0800  insulin aspart U-100 pen 7 Units         -- SubQ 3 times daily with meals 11/26/24 0657    11/25/24 1057  insulin aspart U-100 pen 0-10 Units         -- SubQ Before meals & nightly PRN 11/25/24 1000          Hold Oral hypoglycemics while patient is in the hospital.  At home was on :  Medication Changes:   Jardiance 10 mg daily  Lantus 5 units in AM and 5 units in the PM   Novolog 3-5-4 units before meals   Add correction scale if needed.   Blood sugar 180 to 230 add 1 units   Blood sugar 231 to 280 add 2 units   Blood sugar greater than 281 add 3 units     -glucose high in 200s on 11/25 PM so 5 U given post op 11/25 and higher in 300s 11/26 so titrated up and rosa novolgo added but nauseated/poor oral intake so being covered with SSI now with improving from 300s-200s now. Vbg with ph 7.35 so not in dka as metabolic acidosis likely from perfusion/biju with elevated lactate likely from this as cause and not from DKA, beta hydroxy a small bit up but also hasn't eaten much at all in a day so has reason for ketones to be slightly up as well and ruled out dka as contributor right now for acidosis with other cause  -titrate further, steroid given intra-op likely contributor to highs as per note from April had similar issue post op then and then has had labile glucoses with lows last admit as well so will watch closely for  trends  -on bland diet now for nausea but will titrate back to dm when ready

## 2024-11-26 NOTE — ANESTHESIA POSTPROCEDURE EVALUATION
Anesthesia Post Evaluation    Patient: Sussy Costa    Procedure(s) Performed: Procedure(s) (LRB):  INSERTION, INTRAMEDULLARY CAYDEN, FEMUR (Right)    Final Anesthesia Type: general      Patient location during evaluation: PACU  Patient participation: Yes- Able to Participate  Level of consciousness: awake  Post-procedure vital signs: reviewed and stable  Pain management: adequate  Airway patency: patent    PONV status at discharge: No PONV  Anesthetic complications: no      Cardiovascular status: hemodynamically stable  Respiratory status: unassisted and spontaneous ventilation  Hydration status: euvolemic                Vitals Value Taken Time   /59 11/26/24 0040   Temp 36.3 °C (97.3 °F) 11/26/24 0040   Pulse 107 11/26/24 0156   Resp 18 11/26/24 0040   SpO2 99 % 11/26/24 0040         Event Time   Out of Recovery 19:45:00         Pain/Caitie Score: Pain Rating Prior to Med Admin: 2 (11/25/2024 11:04 PM)  Pain Rating Post Med Admin: 7 (11/25/2024  8:13 AM)  Caitie Score: 10 (11/25/2024 10:00 PM)

## 2024-11-26 NOTE — ASSESSMENT & PLAN NOTE
Patients blood pressure range in the last 24 hours was: BP  Min: 106/50  Max: 183/77.The patient's inpatient anti-hypertensive regimen is listed below:  Current Antihypertensives  hydrALAZINE tablet 50 mg, Every 8 hours PRN, Oral    Plan  Hold home ARB for ADARSH and PRN hydralazine if needed, BP lower normal now with volume depletion 11/26

## 2024-11-26 NOTE — PROGRESS NOTES
[facility] Medicine  Progress Note    Patient Name: Sussy Costa  MRN: [id number]  Patient Class: IP- Inpatient   Admission Date: 11/25/2024  Length of Stay: 1 days  Attending Physician: Glenny Layton MD  Primary Care Provider: Leon Ramírez MD        Subjective:     Principal Problem:Closed intertrochanteric fracture of right femur        HPI:  Ms. Sussy Costa is an 89-year-old female w/ a h/o left intertrochanteric fracture s/p surgery 04/2024, Type 2 DM, CKD stage 3, HTN, pancreatic insufficiency, Asthma, Afib that presents for evaluation of hip pain. She was getting up to walk to the bathroom this morning an hour prior to arrival, took 2 steps and then felt herself falling. She fell onto her right side and attempted to grab her dresser and scraped her arm. She is unsure if she hit her head or had a syncopal event before falling. She denies any preceding chest pain, palpitations, lightheadedness or dizziness. She reports 8/10 right-sided hip pain that extends to mid thigh. She reports minimal blood loss from her arm. Bleeding was controlled with a dressing. She has not eaten since dinner at 6:00 pm yesterday. Sip of water at 11:00 p.m. yesterday for meds. She denied any chest pain, shortness of breath, abdominal pain. She did not take her medications this morning.     In ED, Pt hypertensive 228/105, otherwise AFVSS on RA. WBC 18, hgb 11.2. K 3.3. . Trop 0.022. XR b/l hips: Acute fracture of the right hip. CTH: No acute intracranial hemorrhage. CT C spine: Broad central disc herniation with stable extrusion and superior migration fine the C5 vertebral body flattens the traversing cord with presumably some encroachment on the exiting right C6 nerve root. No acute cervical fracture. Given IV morphine, IV antiemetics. RUE lac repaired.     Overview/Hospital Course:  No notes on file    Interval history- went late in day around 6 pm for nail with dr ram and came to bedside  yesterday to talk to her and son/ with updates twice in afternoon and known to me from previous admit earlier this year. Home lantus dosing started last night at 5 U as glucose increased in 200s even before surgery. Didn't eat really last night post op and this morning on exam nauseated when trying to eat grits for breakfast and doesn't feel too well. Cr increased from 1 -1.7. last admit had ADARSH post op with Cr to 2.1 that improved with hdyration post op. Repeat Cr lunchtime at 1.9. metabolic acidosis with bicarb 13 and bicarb tabs started, repeat 16 with lactic acid --2.5-3.8 and likely from perfusion/hypovolemia as was only able to tolerate a few sips of pedialyte due to nausea this mornign so changed to 100 cc/hr NS for 10 hours with repeat higher so bolus added around 1 pm and updated nursing uri with plan to repet lactic acid around 4 or so pm, likely will push back as went to check on her again on 3rd exam today aroudn 1:15 pm and updae her on plans and bolus was infiltrating from IV into skin around right arm right above bandages for skin tear/laceration with small area of infiltration below skin and stopped bolus, around 50-75 max appeared infused from bag and let her know will need IV. She reports lost 1 alrady and nurse uri updated who reports Is very hard stick so consulting midline team stat given needs hdyration for lactic acidosis as well. Glucose 300s this morning and with nausea checked to make sure was not in early DKA and only small amount of ketones 1.1 beta hydroxy on labs and  given was not eating at all in last day as NPO not completely shocking so VBG obtained with pH 7.35 and not acidotic and glucose improving in 200s now on SSI + lantus so no signs appears to be from DKA and waws given steroids intra-op for surgery, and had similar highs post op last admit per my notes in April so titrating for this. [place] diet for lunch and she reports had a few bites of mashed potatoes and  tolerating a few bites of jello. Will inc rate of ivf to increaed to 150 cc/hr once bolus is able to infuse first once new IVo btained and push back lactic acid to at least 5 pm given will be delay in continuing infusion for new IV. Compazine added for 2nd line nausea as she reports zofran helped earlier and discussed with her and savannahin to stay ahead with nausea meds while dehydrated as likely worsening nausea and preventing oral intake in cycle of volume depletion now. No sign of infection as cause, wbc 18 on admit likely reactive as normalized to 10 now without signs of infection on UA or CXR on admit. ARB held for biju and replaced with low dose hydralazine in interim and -120 so will plan to hold further and just do prn given volume down,.        Review of patient's allergies indicates:   Allergen Reactions    Aspirin Other (See Comments)     Asthma    TRIAD OF NASAL POLYPS, ASA  ALLERGY AND ASTHMA.        Aspirin Other (See Comments)    Nsaids (non-steroidal anti-inflammatory drug) Other (See Comments)     AVOID DUE TO TRIAD OF ASA ALLERGY, NASAL POLYPS,AND ASTHMA  AVOID DUE TO TRIAD OF ASA ALLERGY, NASAL POLYPS,AND ASTHMA    Penicillin      Other reaction(s): Rash    9/30/20: tolerates ceftriaxone    Penicillin g Other (See Comments)     Other reaction(s): Rash    9/30/20: tolerates ceftriaxone       No current facility-administered medications on file prior to encounter.     Current Outpatient Medications on File Prior to Encounter   Medication Sig    acetaminophen (TYLENOL) 500 MG tablet Take 2 tablets (1,000 mg total) by mouth every 8 (eight) hours. (Patient taking differently: Take 1,000 mg by mouth every 8 (eight) hours as needed for Pain.)    ascorbic acid, vitamin C, (VITAMIN C) 500 MG tablet Take 500 mg by mouth once daily.    atorvastatin (LIPITOR) 20 MG tablet Take 1 tablet (20 mg total) by mouth every evening.    calcium carbonate (TUMS ORAL) Take 2 tablets by mouth daily as needed  (Heartburn).    denosumab (PROLIA) 60 mg/mL Syrg Inject 1 mL (60 mg total) into the skin every 6 (six) months. Hold at rehab (Patient taking differently: Inject 60 mg into the skin every 6 (six) months.)    empagliflozin (JARDIANCE) 10 mg tablet Take 1 tablet (10 mg total) by mouth once daily.    ferrous sulfate (IRON ORAL) Take 1 tablet by mouth every other day.    fluticasone-umeclidin-vilanter (TRELEGY ELLIPTA) 200-62.5-25 mcg inhaler Inhale 1 puff into the lungs once daily.    insulin aspart U-100 (NOVOLOG FLEXPEN U-100 INSULIN) 100 unit/mL (3 mL) InPn pen Inject 5 Units into the skin 3 (three) times daily with meals. (Patient taking differently: Inject 3 units into skin every morning, 5 units at lunch & 4 units at supper.)    insulin glargine U-100, Lantus, (LANTUS SOLOSTAR U-100 INSULIN) 100 unit/mL (3 mL) InPn pen Inject 6 Units into the skin once daily AND 5 Units every evening. (Patient taking differently: Inject 5 Units into the skin once daily AND 5 Units every evening.)    latanoprost 0.005 % ophthalmic solution Place 1 drop into both eyes every evening.    lipase-protease-amylase 24,000-76,000-120,000 units (CREON) 24,000-76,000 -120,000 unit capsule TAKE 2 CAPSULES WITH MEALS AND 1 CAPSULE WITH SNACKS.    losartan (COZAAR) 100 MG tablet Take 1 tablet (100 mg total) by mouth once daily.    magnesium oxide (MAG-OX) 400 mg (241.3 mg magnesium) tablet Take 400 mg by mouth 2 (two) times daily.    montelukast (SINGULAIR) 10 mg tablet Take 1 tablet (10 mg total) by mouth every evening.    olopatadine (PATANOL) 0.1 % ophthalmic solution Place 1 drop into both eyes 2 (two) times daily as needed for Allergies. 1 Drops Ophthalmic Twice a day    senna-docusate 8.6-50 mg (PERICOLACE) 8.6-50 mg per tablet Take 1 tablet by mouth 2 (two) times daily. (Patient taking differently: Take 1 tablet by mouth every other day.)    UNABLE TO FIND BUDESONIDE 0.8 MG CAPS - Empty contents of 1 capsule into nasal irrigation system,  "add distilled water and salt pack then mix & irrigate once daily.    vitamin D (VITAMIN D3) 1000 units Tab Take 1,000 Units by mouth once daily.    ACCU-CHEK GUIDE TEST STRIPS Strp TEST FOUR TIMES DAILY WITH MEALS AND NIGHTLY    BD ULTRA-FINE SHORT PEN NEEDLE 31 gauge x 5/16" Ndle USE with insulin pen 5 TIMES A DAY    lancets (ACCU-CHEK SOFTCLIX LANCETS) Misc 1 each by Misc.(Non-Drug; Combo Route) route 4 (four) times daily.    [DISCONTINUED] risedronate (ACTONEL) 35 MG tablet Take 1 tablet (35 mg total) by mouth every 7 days.     Family History       Problem Relation (Age of Onset)    Heart disease Father, Brother    Hypertension Brother          Tobacco Use    Smoking status: Never     Passive exposure: Never    Smokeless tobacco: Never   Substance and Sexual Activity    Alcohol use: Yes     Comment: socially    Drug use: No    Sexual activity: Yes     Partners: Male     Birth control/protection: Post-menopausal     Review of Systems   Constitutional:  Negative for activity change, chills, diaphoresis, fatigue and fever.   HENT:  Negative for congestion, rhinorrhea and sore throat.    Respiratory:  Negative for cough, chest tightness, shortness of breath and wheezing.    Cardiovascular:  Negative for chest pain, palpitations and leg swelling.   Gastrointestinal:  Positive for nausea and vomiting. Negative for abdominal distention, abdominal pain, blood in stool, constipation and diarrhea.   Genitourinary:  Negative for difficulty urinating, dysuria, frequency, hematuria and urgency.   Musculoskeletal:  Positive for arthralgias. Negative for back pain and neck stiffness.   Neurological:  Positive for syncope. Negative for dizziness, tremors, seizures, weakness, light-headedness, numbness and headaches.   Psychiatric/Behavioral:  Negative for agitation, confusion and hallucinations.      Objective:     Vital Signs (Most Recent):  Temp: 97.8 °F (36.6 °C) (11/26/24 1135)  Pulse: 99 (11/26/24 1135)  Resp: 17 (11/26/24 " 1135)  BP: (!) 106/50 (11/26/24 1135)  SpO2: (!) 94 % (11/26/24 1135) Vital Signs (24h Range):  Temp:  [97.2 °F (36.2 °C)-97.9 °F (36.6 °C)] 97.8 °F (36.6 °C)  Pulse:  [] 99  Resp:  [16-23] 17  SpO2:  [94 %-100 %] 94 %  BP: (106-183)/(50-83) 106/50     Weight: 53 kg (116 lb 13.5 oz)  Body mass index is 19.44 kg/m².     Physical Exam  Vitals and nursing note reviewed.   Constitutional:       General: She is not in acute distress.     Appearance: She is well-developed. She is not diaphoretic.      Comments: Nauseated. Appears fatigued   HENT:      Head: Normocephalic and atraumatic.      Right Ear: External ear normal.      Left Ear: External ear normal.      Nose: Nose normal. No congestion.      Mouth/Throat:      Pharynx: Oropharynx is clear.   Eyes:      General: No scleral icterus.     Extraocular Movements: Extraocular movements intact.   Cardiovascular:      Rate and Rhythm: Normal rate and regular rhythm.      Pulses: Normal pulses.      Heart sounds: Normal heart sounds. No murmur heard.  Pulmonary:      Effort: Pulmonary effort is normal. No respiratory distress.      Breath sounds: Normal breath sounds. No wheezing or rales.   Abdominal:      General: Bowel sounds are normal. There is no distension.      Palpations: Abdomen is soft.      Tenderness: There is no abdominal tenderness. There is no guarding or rebound.   Musculoskeletal:      Cervical back: Neck supple.      Right lower leg: No edema.      Left lower leg: No edema.      Comments: Bandages to Right forearm with sutures placed underneath for skin tear on admit. Bandages to RLE. On 3rd exam today, had small amount of saline infiltrate around right AC fossa from IV and stopped infusion   Skin:     General: Skin is warm and dry.      Capillary Refill: Capillary refill takes less than 2 seconds.   Neurological:      General: No focal deficit present.      Mental Status: She is alert and oriented to person, place, and time. Mental status is at  baseline.   Psychiatric:         Mood and Affect: Mood normal.         Behavior: Behavior normal.         Thought Content: Thought content normal.                Significant Labs: All pertinent labs within the past 24 hours have been reviewed.  CBC:   Recent Labs   Lab 11/25/24  0722 11/25/24  2110 11/26/24  0554   WBC 18.08* 10.81 10.81   HGB 11.2* 8.1* 8.0*   HCT 35.5* 24.7* 23.7*    SEE COMMENT SEE COMMENT     CMP:   Recent Labs   Lab 11/25/24  0722 11/26/24  0554 11/26/24  1207    143 142   K 3.3* 4.5 3.7   * 115* 113*   CO2 20* 13* 16*   * 318* 219*   BUN 24* 30* 40*   CREATININE 1.0 1.7* 1.9*   CALCIUM 9.5 8.0* 7.9*   PROT 6.5  --   --    ALBUMIN 3.6  --   --    BILITOT 0.5  --   --    ALKPHOS 61  --   --    AST 29  --   --    ALT 29  --   --    ANIONGAP 9 15 13     Cardiac Markers:   Recent Labs   Lab 11/25/24  0722   *     Troponin:   Recent Labs   Lab 11/25/24 0722 11/25/24  0959   TROPONINI 0.022 0.017       Significant Imaging: I have reviewed all pertinent imaging results/findings within the past 24 hours.  Imaging Results              X-Ray Chest 1 View Pre-OP (Final result)  Result time 11/25/24 11:24:59      Final result by Johan Seo MD (11/25/24 11:24:59)                   Impression:      No significant intrathoracic abnormality.  No significant detrimental interval change in the appearance of the chest since 03/30/2024 at 09:18 is appreciated.      Electronically signed by: Johan Seo MD  Date:    11/25/2024  Time:    11:24               Narrative:    EXAMINATION:  XR CHEST 1 VIEW PRE-OP    CLINICAL HISTORY:  pre op;    TECHNIQUE:  One view    COMPARISON:  Comparison is made to 03/30/2024 at 21:18.    FINDINGS:  Allowing for magnification of the cardiomediastinal silhouette related to projection, the true heart size is at the upper limit of normal to minimally enlarged, and the cardiomediastinal silhouette has not changed appreciably since the examination  referenced above.  Lung zones are stable since the prior exam, with no new areas of airspace consolidation or volume loss evident.  No pleural fluid.  No abnormal mediastinal widening, with tortuosity of the thoracic aorta and calcification in its wall again observed.  A skin fold is superimposed over the right hemithorax, but no pneumothorax is seen.  Incidental observations again include some scoliosis convex to the right in the upper thoracic region and a minor deformity relating to an old healed fracture of the lateral aspect of the right 6th rib.                                       CT Head Without Contrast (Final result)  Result time 11/25/24 08:40:10      Final result by Kristian Harmon MD (11/25/24 08:40:10)                   Impression:      No acute intracranial hemorrhage/injury.      Electronically signed by: Kristian Harmon  Date:    11/25/2024  Time:    08:40               Narrative:    EXAMINATION:  CT HEAD WITHOUT CONTRAST    CLINICAL HISTORY:  Head trauma, minor (Age >= 65y);    TECHNIQUE:  Low dose axial images were obtained through the head.  Coronal and sagittal reformations were also performed. Contrast was not administered.    COMPARISON:  None.    FINDINGS:  There is no evidence of hydrocephalus, mass effect, intracranial hemorrhage or acute territorial infarct.    Decreased attenuation in the periventricular white matter is nonspecific but may reflect moderate chronic small vessel ischemic change.    No acute calvarial fracture is identified.  There is opacification of the frontal and residual anterior ethmoid air cells with postsurgical changes within the paranasal sinuses.                                       CT Cervical Spine Without Contrast (Final result)  Result time 11/25/24 08:47:01      Final result by Kristian Harmon MD (11/25/24 08:47:01)                   Impression:      No acute cervical fracture.    Broad central disc herniation with stable extrusion and superior  migration fine the C5 vertebral body flattens the traversing cord with presumably some encroachment on the exiting right C6 nerve root.      Electronically signed by: Kristian Harmon  Date:    11/25/2024  Time:    08:47               Narrative:    EXAMINATION:  CT CERVICAL SPINE WITHOUT CONTRAST    CLINICAL HISTORY:  Neck trauma (Age >= 65y);    TECHNIQUE:  Low dose axial images, sagittal and coronal reformations were performed though the cervical spine.  Contrast was not administered.    COMPARISON:  None    FINDINGS:  There is no evidence of acute cervical fracture.    The prevertebral soft tissues are unremarkable.    Degenerative changes are identified within the cervical spine.  At C5-6 there is a broad central disc herniation with extrusion and superior migration by in the C5 vertebral body that results in flattening of the traversing cord and encroachment on the exiting right C6 nerve root.    Atherosclerotic vascular calcifications are identified at the carotid bifurcations bilaterally.                                       X-Ray Hips Bilateral 2 View Incl AP Pelvis (Final result)  Result time 11/25/24 08:20:04      Final result by Johan Miles MD (11/25/24 08:20:04)                   Impression:      Acute fracture of the right hip.      Electronically signed by: Johan Miles MD  Date:    11/25/2024  Time:    08:20               Narrative:    EXAMINATION:  XR HIPS BILATERAL 2 VIEW INCL AP PELVIS    CLINICAL HISTORY:  Pain in unspecified hip    TECHNIQUE:  AP view of the pelvis and frogleg lateral views of both hips were performed.    COMPARISON:  None 04/29/2024.    FINDINGS:  There is an  acute  comminuted  inter trochanteric fracture identified involving the right hip.    Postoperative changes of internal fixation of the left hip and femur identified as before.  No bone destruction.                                       Assessment/Plan:      * Closed intertrochanteric fracture of right  femur  Osteoporosis with current pathological fracture   Senile osteoporosis     -sustained in mechanical fall and associated with known osteoporosis  -hip pathway started on admission with multimodal pain control, bowel regimen  -OR with ortho no 11/25 for IM nail with dr leanna quinonez post op for 35 days, end date 12/25  -ca out today  -PT/OT today and ref to rehab where she went after hip fx earlier this year as had great experience poor family preference  -hg 8 post op and trending  -cont vit D, needs osteoporosis clinic f/u as was already on prolia with new fx now      Lactic acidosis  See metabolic acidosis      Nausea  Zofran + compazine. [place] diet with starting to have some small tolerance of potatoes, jello      Metabolic acidosis  Suspect from perfusion from poor intake as dka ruled out as cause with normal ph and only tick elevated ketones, with lactic acid 2--3.7 and oral intake very poor/nausea  -13 --16 on na bicarb tabs started 11/26 AM and increased IVF + Bolus this PM, repeat lactic acid 5 pm      Laceration of right forearm  5 sutures in ER and covered on admit, due in 7-10 days for removal - dec 25th and can remove at post acute stay      Postural dizziness with presyncope  - . Trop 0.022  - ECG w/ Sinus rhythm with Premature atrial complexes   - echo ordered and pending  - tele  Suspect dry on admit given now with biju/nausea post op 11/26    Normocytic anemia  Anemia is likely due to chronic disease due to Chronic Kidney Disease. Most recent hemoglobin and hematocrit are listed below.  Recent Labs     11/25/24  0722 11/25/24  2110 11/26/24  0554   HGB 11.2* 8.1* 8.0*   HCT 35.5* 24.7* 23.7*       Plan  - Monitor serial CBC: Daily  - Transfuse PRBC if patient becomes hemodynamically unstable, symptomatic or H/H drops below 7/21.  - Patient has not received any PRBC transfusions to date  - Patient's anemia is currently improving from baseline  Expected downtrend post op from 11- 8  and would continue to trend.    Abnormal computed tomography of cervical spine  - CT C spine: No acute cervical fracture. 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   - no symptoms but if develop would refer to OP NS clinic      Hypokalemia  Patient's most recent potassium results are listed below.   Recent Labs     11/25/24 0722 11/26/24  0554 11/26/24  1207   K 3.3* 4.5 3.7       Plan  - Replete potassium per protocol  - Monitor potassium Daily  - Patient's hypokalemia is stable    Leukocytosis  - likely stress induced in setting of acute fracture, no s/s infectious process at this time as 18 on admit with normal UA and CXR, now normalized at 10    ADARSH (acute kidney injury)  ADARSH is likely due to pre-renal azotemia due to intravascular volume depletion. Baseline creatinine is  1.0 on admit . Most recent creatinine and eGFR are listed below.  Recent Labs     11/25/24 0722 11/26/24  0554 11/26/24  1207   CREATININE 1.0 1.7* 1.9*   EGFRNORACEVR 54.2* 28.7* 25.1*      Plan  - ADARSH is worsening. Will IVF added as not tolerating pedialyte well this AM due to nausea, and bolus given as well but having to hold due to poor IV access/infiltrate IV with elevated lactic acid likely from perfusion/intake, toelrating small bits of bland. Hold losartan and inc fluids this PM 11/26 once new IV obtained after bolus.  - Avoid nephrotoxins and renally dose meds for GFR listed above  - Monitor urine output, serial BMP, and adjust therapy as needed  -    Vitamin D insufficiency  - vit D supplement, vit D 28    Acute blood loss anemia  Anemia is likely due to acute blood loss which was from surgery and expected . Most recent hemoglobin and hematocrit are listed below.  Recent Labs     11/25/24 0722 11/25/24 2110 11/26/24  0554   HGB 11.2* 8.1* 8.0*   HCT 35.5* 24.7* 23.7*      Plan  - Monitor serial CBC: Daily  - Transfuse PRBC if patient becomes hemodynamically unstable, symptomatic or H/H drops below 7/21.  - Patient has not received any PRBC transfusions to date  - Patient's anemia is currently  monitoring      Stage 3 chronic kidney disease  Creatine stable for now. BMP reviewed- noted Estimated Creatinine Clearance: 17.1 mL/min (A) (based on SCr of 1.9 mg/dL (H)). according to latest data. 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  Monitor UOP and serial BMP and adjust therapy as needed. Renally dose meds. Avoid nephrotoxic medications and procedures.  Baseline 1 now with ADARSH 11/26 see adarsh    Paroxysmal atrial fibrillation  Patient has paroxysmal (<7 days) atrial fibrillation. Patient is currently in sinus rhythm. [id number] Score: 4. The patients heart rate in the last 24 hours is as follows:  Pulse  Min: 75  Max: 89     Antiarrhythmics       Anticoagulants       Plan  - Replete lytes with a goal of K>4, Mg >2  - Patient's afib is currently controlled      Pancreatic insufficiency  - continue creon tidwm    Asthma, moderate persistent  - duonebs prn, continue controller  Trilegy not on formulary inpatient. Follows closely with pulm    Senile osteoporosis  Associated with current fracture. On prolia at home, hold now and will need fragility clinic f/u      Glaucoma  - continue latanoprost    Diabetes mellitus type II  Patient's FSGs are controlled on current medication regimen.  Last A1c reviewed-   Lab Results   Component Value Date    HGBA1C 5.9 (H) 11/25/2024     Most recent fingerstick glucose reviewed-   Recent Labs   Lab 11/26/24  0606 11/26/24  0826 11/26/24  1130 11/26/24  1143   POCTGLUCOSE 346* 340* 251* 258*     Current correctional scale  Medium  Maintain anti-hyperglycemic dose as follows-   Antihyperglycemics (From admission, onward)      Start     Stop Route Frequency Ordered    11/26/24 0900  insulin glargine U-100 (Lantus) pen 10 Units         --  SubQ 2 times daily 11/26/24 0657    11/26/24 0800  insulin aspart U-100 pen 7 Units         -- SubQ 3 times daily with meals 11/26/24 0657    11/25/24 1057  insulin aspart U-100 pen 0-10 Units         -- SubQ Before meals & nightly PRN 11/25/24 1000          Hold Oral hypoglycemics while patient is in the hospital.  At home was on :  Medication Changes:   Jardiance 10 mg daily  Lantus 5 units in AM and 5 units in the PM   Novolog 3-5-4 units before meals   Add correction scale if needed.   Blood sugar 180 to 230 add 1 units   Blood sugar 231 to 280 add 2 units   Blood sugar greater than 281 add 3 units     -glucose high in 200s on 11/25 PM so 5 U given post op 11/25 and higher in 300s 11/26 so titrated up and rosa novolgo added but nauseated/poor oral intake so being covered with SSI now with improving from 300s-200s now. Vbg with ph 7.35 so not in dka as metabolic acidosis likely from perfusion/biju with elevated lactate likely from this as cause and not from DKA, beta hydroxy a small bit up but also hasn't eaten much at all in a day so has reason for ketones to be slightly up as well and ruled out dka as contributor right now for acidosis with other cause  -titrate further, steroid given intra-op likely contributor to highs as per note from April had similar issue post op then and then has had labile glucoses with lows last admit as well so will watch closely for trends  -on bland diet now for nausea but will titrate back to dm when ready    Primary hypertension  Patients blood pressure range in the last 24 hours was: BP  Min: 106/50  Max: 183/77.The patient's inpatient anti-hypertensive regimen is listed below:  Current Antihypertensives  hydrALAZINE tablet 50 mg, Every 8 hours PRN, Oral    Plan  Hold home ARB for BIJU and PRN hydralazine if needed, BP lower normal now with volume depletion 11/26      VTE Risk Mitigation (From admission, onward)           Ordered     apixaban tablet 2.5 mg  2 times daily          11/25/24 1933     Place sequential compression device  Until discontinued         11/25/24 1626     Place sequential compression device  Until discontinued         11/25/24 1000                    Discharge Planning   ANTOINE: 11/27/2024     Code Status: Full Code   Is the patient medically ready for discharge?:     Reason for patient still in hospital (select all that apply): Patient trending condition  Discharge Plan A: Rehab                  Glenny Layton MD  Department of [facility]   [facility] - [facility]

## 2024-11-26 NOTE — ASSESSMENT & PLAN NOTE
Creatine stable for now. BMP reviewed- noted Estimated Creatinine Clearance: 17.1 mL/min (A) (based on SCr of 1.9 mg/dL (H)). according to latest data. 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  Monitor UOP and serial BMP and adjust therapy as needed. Renally dose meds. Avoid nephrotoxic medications and procedures.  Baseline 1 now with ADARSH 11/26 see adarsh

## 2024-11-26 NOTE — PLAN OF CARE
Problem: Skin Injury Risk Increased  Goal: Skin Health and Integrity  Outcome: Progressing     Problem: Hip Fracture Medical Management  Goal: Optimal Coping with Change in Health Status  Outcome: Progressing  Goal: Absence of Bleeding  Outcome: Progressing  Goal: Effective Bowel Elimination  Outcome: Progressing  Goal: Baseline Cognitive Function Maintained  Outcome: Progressing  Goal: Absence of Embolism  Outcome: Progressing  Goal: Fracture Stability  Outcome: Progressing  Goal: Optimal Functional Performance  Outcome: Progressing  Goal: Pain Control and Function  Outcome: Progressing  Goal: Effective Urinary Elimination  Outcome: Progressing     Problem: Surgery Nonspecified  Goal: Absence of Bleeding  Outcome: Progressing  Goal: Effective Bowel Elimination  Outcome: Progressing  Goal: Fluid and Electrolyte Balance  Outcome: Progressing  Goal: Blood Glucose Level Within Targeted Range  Outcome: Progressing  Goal: Absence of Infection Signs and Symptoms  Outcome: Progressing  Goal: Anesthesia/Sedation Recovery  Outcome: Progressing  Goal: Optimal Pain Control and Function  Outcome: Progressing  Goal: Nausea and Vomiting Relief  Outcome: Progressing  Goal: Effective Urinary Elimination  Outcome: Progressing  Goal: Effective Oxygenation and Ventilation  Outcome: Progressing     Problem: Anesthesia/Analgesia, Neuraxial  Goal: Safe, Effective Infusion Delivery  Outcome: Progressing  Goal: Absence of Infection Signs and Symptoms  Outcome: Progressing  Goal: Nausea and Vomiting Relief  Outcome: Progressing  Goal: Effective Pain Control  Outcome: Progressing  Goal: Effective Oxygenation and Ventilation  Outcome: Progressing  Goal: Baseline Motor Function  Outcome: Progressing  Goal: Effective Urinary Elimination  Outcome: Progressing     Problem: Fall Injury Risk  Goal: Absence of Fall and Fall-Related Injury  Outcome: Progressing     Problem: Adult Inpatient Plan of Care  Goal: Plan of Care Review  Outcome:  Progressing  Goal: Patient-Specific Goal (Individualized)  Outcome: Progressing  Goal: Absence of Hospital-Acquired Illness or Injury  Outcome: Progressing  Goal: Optimal Comfort and Wellbeing  Outcome: Progressing  Goal: Readiness for Transition of Care  Outcome: Progressing     Problem: Infection  Goal: Absence of Infection Signs and Symptoms  Outcome: Progressing     Problem: Diabetes Comorbidity  Goal: Blood Glucose Level Within Targeted Range  Outcome: Progressing     Problem: Wound  Goal: Optimal Coping  Outcome: Progressing  Goal: Optimal Functional Ability  Outcome: Progressing  Goal: Absence of Infection Signs and Symptoms  Outcome: Progressing  Goal: Improved Oral Intake  Outcome: Progressing  Goal: Optimal Pain Control and Function  Outcome: Progressing  Goal: Skin Health and Integrity  Outcome: Progressing  Goal: Optimal Wound Healing  Outcome: Progressing

## 2024-11-26 NOTE — ASSESSMENT & PLAN NOTE
- CT C spine: No acute cervical fracture. 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   - no symptoms but if develop would refer to OP NS clinic

## 2024-11-26 NOTE — ASSESSMENT & PLAN NOTE
ADARSH is likely due to pre-renal azotemia due to intravascular volume depletion. Baseline creatinine is  1.0 on admit . Most recent creatinine and eGFR are listed below.  Recent Labs     11/25/24  0722 11/26/24  0554 11/26/24  1207   CREATININE 1.0 1.7* 1.9*   EGFRNORACEVR 54.2* 28.7* 25.1*      Plan  - ADARSH is worsening. Will IVF added as not tolerating pedialyte well this AM due to nausea, and bolus given as well but having to hold due to poor IV access/infiltrate IV with elevated lactic acid likely from perfusion/intake, toelrating small bits of bland. Hold losartan and inc fluids this PM 11/26 once new IV obtained after bolus.  - Avoid nephrotoxins and renally dose meds for GFR listed above  - Monitor urine output, serial BMP, and adjust therapy as needed  -

## 2024-11-26 NOTE — ASSESSMENT & PLAN NOTE
Suspect from perfusion from poor intake as dka ruled out as cause with normal ph and only tick elevated ketones, with lactic acid 2--3.7 and oral intake very poor/nausea  -13 --16 on na bicarb tabs started 11/26 AM and increased IVF + Bolus this PM, repeat lactic acid 5 pm

## 2024-11-26 NOTE — ASSESSMENT & PLAN NOTE
Patient's most recent potassium results are listed below.   Recent Labs     11/25/24  0722 11/26/24  0554 11/26/24  1207   K 3.3* 4.5 3.7       Plan  - Replete potassium per protocol  - Monitor potassium Daily  - Patient's hypokalemia is stable

## 2024-11-26 NOTE — SUBJECTIVE & OBJECTIVE
Interval history- went late in day around 6 pm for nail with dr ram and came to bedside yesterday to talk to her and son/ with updates twice in afternoon and known to me from previous admit earlier this year. Home lantus dosing started last night at 5 U as glucose increased in 200s even before surgery. Didn't eat really last night post op and this morning on exam nauseated when trying to eat grits for breakfast and doesn't feel too well. Cr increased from 1 -1.7. last admit had ADARSH post op with Cr to 2.1 that improved with hdyration post op. Repeat Cr lunchtime at 1.9. metabolic acidosis with bicarb 13 and bicarb tabs started, repeat 16 with lactic acid --2.5-3.8 and likely from perfusion/hypovolemia as was only able to tolerate a few sips of pedialyte due to nausea this mornign so changed to 100 cc/hr NS for 10 hours with repeat higher so bolus added around 1 pm and updated nursing uri with plan to repet lactic acid around 4 or so pm, likely will push back as went to check on her again on 3rd exam today aroudn 1:15 pm and updae her on plans and bolus was infiltrating from IV into skin around right arm right above bandages for skin tear/laceration with small area of infiltration below skin and stopped bolus, around 50-75 max appeared infused from bag and let her know will need IV. She reports lost 1 alrady and nurse uri updated who reports Is very hard stick so consulting midline team stat given needs hdyration for lactic acidosis as well. Glucose 300s this morning and with nausea checked to make sure was not in early DKA and only small amount of ketones 1.1 beta hydroxy on labs and  given was not eating at all in last day as NPO not completely shocking so VBG obtained with pH 7.35 and not acidotic and glucose improving in 200s now on SSI + lantus so no signs appears to be from DKA and waws given steroids intra-op for surgery, and had similar highs post op last admit per my notes in April so  titrating for this. [place] diet for lunch and she reports had a few bites of mashed potatoes and tolerating a few bites of jello. Will inc rate of ivf to increaed to 150 cc/hr once bolus is able to infuse first once new IVo btained and push back lactic acid to at least 5 pm given will be delay in continuing infusion for new IV. Compazine added for 2nd line nausea as she reports zofran helped earlier and discussed with her and savannahin to stay ahead with nausea meds while dehydrated as likely worsening nausea and preventing oral intake in cycle of volume depletion now. No sign of infection as cause, wbc 18 on admit likely reactive as normalized to 10 now without signs of infection on UA or CXR on admit. ARB held for biju and replaced with low dose hydralazine in interim and -120 so will plan to hold further and just do prn given volume down,.        Review of patient's allergies indicates:   Allergen Reactions    Aspirin Other (See Comments)     Asthma    TRIAD OF NASAL POLYPS, ASA  ALLERGY AND ASTHMA.        Aspirin Other (See Comments)    Nsaids (non-steroidal anti-inflammatory drug) Other (See Comments)     AVOID DUE TO TRIAD OF ASA ALLERGY, NASAL POLYPS,AND ASTHMA  AVOID DUE TO TRIAD OF ASA ALLERGY, NASAL POLYPS,AND ASTHMA    Penicillin      Other reaction(s): Rash    9/30/20: tolerates ceftriaxone    Penicillin g Other (See Comments)     Other reaction(s): Rash    9/30/20: tolerates ceftriaxone       No current facility-administered medications on file prior to encounter.     Current Outpatient Medications on File Prior to Encounter   Medication Sig    acetaminophen (TYLENOL) 500 MG tablet Take 2 tablets (1,000 mg total) by mouth every 8 (eight) hours. (Patient taking differently: Take 1,000 mg by mouth every 8 (eight) hours as needed for Pain.)    ascorbic acid, vitamin C, (VITAMIN C) 500 MG tablet Take 500 mg by mouth once daily.    atorvastatin (LIPITOR) 20 MG tablet Take 1 tablet (20 mg total) by mouth  every evening.    calcium carbonate (TUMS ORAL) Take 2 tablets by mouth daily as needed (Heartburn).    denosumab (PROLIA) 60 mg/mL Syrg Inject 1 mL (60 mg total) into the skin every 6 (six) months. Hold at rehab (Patient taking differently: Inject 60 mg into the skin every 6 (six) months.)    empagliflozin (JARDIANCE) 10 mg tablet Take 1 tablet (10 mg total) by mouth once daily.    ferrous sulfate (IRON ORAL) Take 1 tablet by mouth every other day.    fluticasone-umeclidin-vilanter (TRELEGY ELLIPTA) 200-62.5-25 mcg inhaler Inhale 1 puff into the lungs once daily.    insulin aspart U-100 (NOVOLOG FLEXPEN U-100 INSULIN) 100 unit/mL (3 mL) InPn pen Inject 5 Units into the skin 3 (three) times daily with meals. (Patient taking differently: Inject 3 units into skin every morning, 5 units at lunch & 4 units at supper.)    insulin glargine U-100, Lantus, (LANTUS SOLOSTAR U-100 INSULIN) 100 unit/mL (3 mL) InPn pen Inject 6 Units into the skin once daily AND 5 Units every evening. (Patient taking differently: Inject 5 Units into the skin once daily AND 5 Units every evening.)    latanoprost 0.005 % ophthalmic solution Place 1 drop into both eyes every evening.    lipase-protease-amylase 24,000-76,000-120,000 units (CREON) 24,000-76,000 -120,000 unit capsule TAKE 2 CAPSULES WITH MEALS AND 1 CAPSULE WITH SNACKS.    losartan (COZAAR) 100 MG tablet Take 1 tablet (100 mg total) by mouth once daily.    magnesium oxide (MAG-OX) 400 mg (241.3 mg magnesium) tablet Take 400 mg by mouth 2 (two) times daily.    montelukast (SINGULAIR) 10 mg tablet Take 1 tablet (10 mg total) by mouth every evening.    olopatadine (PATANOL) 0.1 % ophthalmic solution Place 1 drop into both eyes 2 (two) times daily as needed for Allergies. 1 Drops Ophthalmic Twice a day    senna-docusate 8.6-50 mg (PERICOLACE) 8.6-50 mg per tablet Take 1 tablet by mouth 2 (two) times daily. (Patient taking differently: Take 1 tablet by mouth every other day.)    UNABLE TO  "FIND BUDESONIDE 0.8 MG CAPS - Empty contents of 1 capsule into nasal irrigation system, add distilled water and salt pack then mix & irrigate once daily.    vitamin D (VITAMIN D3) 1000 units Tab Take 1,000 Units by mouth once daily.    ACCU-CHEK GUIDE TEST STRIPS Strp TEST FOUR TIMES DAILY WITH MEALS AND NIGHTLY    BD ULTRA-FINE SHORT PEN NEEDLE 31 gauge x 5/16" Ndle USE with insulin pen 5 TIMES A DAY    lancets (ACCU-CHEK SOFTCLIX LANCETS) Misc 1 each by Misc.(Non-Drug; Combo Route) route 4 (four) times daily.    [DISCONTINUED] risedronate (ACTONEL) 35 MG tablet Take 1 tablet (35 mg total) by mouth every 7 days.     Family History       Problem Relation (Age of Onset)    Heart disease Father, Brother    Hypertension Brother          Tobacco Use    Smoking status: Never     Passive exposure: Never    Smokeless tobacco: Never   Substance and Sexual Activity    Alcohol use: Yes     Comment: socially    Drug use: No    Sexual activity: Yes     Partners: Male     Birth control/protection: Post-menopausal     Review of Systems   Constitutional:  Negative for activity change, chills, diaphoresis, fatigue and fever.   HENT:  Negative for congestion, rhinorrhea and sore throat.    Respiratory:  Negative for cough, chest tightness, shortness of breath and wheezing.    Cardiovascular:  Negative for chest pain, palpitations and leg swelling.   Gastrointestinal:  Positive for nausea and vomiting. Negative for abdominal distention, abdominal pain, blood in stool, constipation and diarrhea.   Genitourinary:  Negative for difficulty urinating, dysuria, frequency, hematuria and urgency.   Musculoskeletal:  Positive for arthralgias. Negative for back pain and neck stiffness.   Neurological:  Positive for syncope. Negative for dizziness, tremors, seizures, weakness, light-headedness, numbness and headaches.   Psychiatric/Behavioral:  Negative for agitation, confusion and hallucinations.      Objective:     Vital Signs (Most " Recent):  Temp: 97.8 °F (36.6 °C) (11/26/24 1135)  Pulse: 99 (11/26/24 1135)  Resp: 17 (11/26/24 1135)  BP: (!) 106/50 (11/26/24 1135)  SpO2: (!) 94 % (11/26/24 1135) Vital Signs (24h Range):  Temp:  [97.2 °F (36.2 °C)-97.9 °F (36.6 °C)] 97.8 °F (36.6 °C)  Pulse:  [] 99  Resp:  [16-23] 17  SpO2:  [94 %-100 %] 94 %  BP: (106-183)/(50-83) 106/50     Weight: 53 kg (116 lb 13.5 oz)  Body mass index is 19.44 kg/m².     Physical Exam  Vitals and nursing note reviewed.   Constitutional:       General: She is not in acute distress.     Appearance: She is well-developed. She is not diaphoretic.      Comments: Nauseated. Appears fatigued   HENT:      Head: Normocephalic and atraumatic.      Right Ear: External ear normal.      Left Ear: External ear normal.      Nose: Nose normal. No congestion.      Mouth/Throat:      Pharynx: Oropharynx is clear.   Eyes:      General: No scleral icterus.     Extraocular Movements: Extraocular movements intact.   Cardiovascular:      Rate and Rhythm: Normal rate and regular rhythm.      Pulses: Normal pulses.      Heart sounds: Normal heart sounds. No murmur heard.  Pulmonary:      Effort: Pulmonary effort is normal. No respiratory distress.      Breath sounds: Normal breath sounds. No wheezing or rales.   Abdominal:      General: Bowel sounds are normal. There is no distension.      Palpations: Abdomen is soft.      Tenderness: There is no abdominal tenderness. There is no guarding or rebound.   Musculoskeletal:      Cervical back: Neck supple.      Right lower leg: No edema.      Left lower leg: No edema.      Comments: Bandages to Right forearm with sutures placed underneath for skin tear on admit. Bandages to RLE. On 3rd exam today, had small amount of saline infiltrate around right AC fossa from IV and stopped infusion   Skin:     General: Skin is warm and dry.      Capillary Refill: Capillary refill takes less than 2 seconds.   Neurological:      General: No focal deficit  present.      Mental Status: She is alert and oriented to person, place, and time. Mental status is at baseline.   Psychiatric:         Mood and Affect: Mood normal.         Behavior: Behavior normal.         Thought Content: Thought content normal.                Significant Labs: All pertinent labs within the past 24 hours have been reviewed.  CBC:   Recent Labs   Lab 11/25/24  0722 11/25/24  2110 11/26/24  0554   WBC 18.08* 10.81 10.81   HGB 11.2* 8.1* 8.0*   HCT 35.5* 24.7* 23.7*    SEE COMMENT SEE COMMENT     CMP:   Recent Labs   Lab 11/25/24  0722 11/26/24  0554 11/26/24  1207    143 142   K 3.3* 4.5 3.7   * 115* 113*   CO2 20* 13* 16*   * 318* 219*   BUN 24* 30* 40*   CREATININE 1.0 1.7* 1.9*   CALCIUM 9.5 8.0* 7.9*   PROT 6.5  --   --    ALBUMIN 3.6  --   --    BILITOT 0.5  --   --    ALKPHOS 61  --   --    AST 29  --   --    ALT 29  --   --    ANIONGAP 9 15 13     Cardiac Markers:   Recent Labs   Lab 11/25/24  0722   *     Troponin:   Recent Labs   Lab 11/25/24  0722 11/25/24  0959   TROPONINI 0.022 0.017       Significant Imaging: I have reviewed all pertinent imaging results/findings within the past 24 hours.  Imaging Results              X-Ray Chest 1 View Pre-OP (Final result)  Result time 11/25/24 11:24:59      Final result by Johan Seo MD (11/25/24 11:24:59)                   Impression:      No significant intrathoracic abnormality.  No significant detrimental interval change in the appearance of the chest since 03/30/2024 at 09:18 is appreciated.      Electronically signed by: Johan Seo MD  Date:    11/25/2024  Time:    11:24               Narrative:    EXAMINATION:  XR CHEST 1 VIEW PRE-OP    CLINICAL HISTORY:  pre op;    TECHNIQUE:  One view    COMPARISON:  Comparison is made to 03/30/2024 at 21:18.    FINDINGS:  Allowing for magnification of the cardiomediastinal silhouette related to projection, the true heart size is at the upper limit of normal to minimally  enlarged, and the cardiomediastinal silhouette has not changed appreciably since the examination referenced above.  Lung zones are stable since the prior exam, with no new areas of airspace consolidation or volume loss evident.  No pleural fluid.  No abnormal mediastinal widening, with tortuosity of the thoracic aorta and calcification in its wall again observed.  A skin fold is superimposed over the right hemithorax, but no pneumothorax is seen.  Incidental observations again include some scoliosis convex to the right in the upper thoracic region and a minor deformity relating to an old healed fracture of the lateral aspect of the right 6th rib.                                       CT Head Without Contrast (Final result)  Result time 11/25/24 08:40:10      Final result by Kristian Harmon MD (11/25/24 08:40:10)                   Impression:      No acute intracranial hemorrhage/injury.      Electronically signed by: Kristian Harmon  Date:    11/25/2024  Time:    08:40               Narrative:    EXAMINATION:  CT HEAD WITHOUT CONTRAST    CLINICAL HISTORY:  Head trauma, minor (Age >= 65y);    TECHNIQUE:  Low dose axial images were obtained through the head.  Coronal and sagittal reformations were also performed. Contrast was not administered.    COMPARISON:  None.    FINDINGS:  There is no evidence of hydrocephalus, mass effect, intracranial hemorrhage or acute territorial infarct.    Decreased attenuation in the periventricular white matter is nonspecific but may reflect moderate chronic small vessel ischemic change.    No acute calvarial fracture is identified.  There is opacification of the frontal and residual anterior ethmoid air cells with postsurgical changes within the paranasal sinuses.                                       CT Cervical Spine Without Contrast (Final result)  Result time 11/25/24 08:47:01      Final result by Kristian Harmon MD (11/25/24 08:47:01)                   Impression:      No  acute cervical fracture.    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      Electronically signed by: Kristian Harmon  Date:    11/25/2024  Time:    08:47               Narrative:    EXAMINATION:  CT CERVICAL SPINE WITHOUT CONTRAST    CLINICAL HISTORY:  Neck trauma (Age >= 65y);    TECHNIQUE:  Low dose axial images, sagittal and coronal reformations were performed though the cervical spine.  Contrast was not administered.    COMPARISON:  None    FINDINGS:  There is no evidence of acute cervical fracture.    The prevertebral soft tissues are unremarkable.    Degenerative changes are identified within the cervical spine.  At C5-6 there is a broad central disc herniation with extrusion and superior migration by in the C5 vertebral body that results in flattening of the traversing cord and encroachment on the exiting right C6 nerve root.    Atherosclerotic vascular calcifications are identified at the carotid bifurcations bilaterally.                                       X-Ray Hips Bilateral 2 View Incl AP Pelvis (Final result)  Result time 11/25/24 08:20:04      Final result by Johan Miles MD (11/25/24 08:20:04)                   Impression:      Acute fracture of the right hip.      Electronically signed by: Johan Miles MD  Date:    11/25/2024  Time:    08:20               Narrative:    EXAMINATION:  XR HIPS BILATERAL 2 VIEW INCL AP PELVIS    CLINICAL HISTORY:  Pain in unspecified hip    TECHNIQUE:  AP view of the pelvis and frogleg lateral views of both hips were performed.    COMPARISON:  None 04/29/2024.    FINDINGS:  There is an  acute  comminuted  inter trochanteric fracture identified involving the right hip.    Postoperative changes of internal fixation of the left hip and femur identified as before.  No bone destruction.                            English

## 2024-11-26 NOTE — ASSESSMENT & PLAN NOTE
Associated with current fracture. On prolia at home, hold now and will need fragility clinic f/u

## 2024-11-26 NOTE — ASSESSMENT & PLAN NOTE
Anemia is likely due to acute blood loss which was from surgery and expected . Most recent hemoglobin and hematocrit are listed below.  Recent Labs     11/25/24  0722 11/25/24  2110 11/26/24  0554   HGB 11.2* 8.1* 8.0*   HCT 35.5* 24.7* 23.7*     Plan  - Monitor serial CBC: Daily  - Transfuse PRBC if patient becomes hemodynamically unstable, symptomatic or H/H drops below 7/21.  - Patient has not received any PRBC transfusions to date  - Patient's anemia is currently  monitoring

## 2024-11-26 NOTE — NURSING TRANSFER
Nursing Transfer Note      11/25/2024   9:57 PM    Nurse giving handoff:GINA Cabral PACU  Nurse receiving handoff:POSS, RN    Reason patient is being transferred: post anesthesia    Transfer To: [id number]    Transfer via bed    Transfer with  to O2, cardiac monitoring    Transported by PCT    Transfer Vital Signs:  Blood Pressure:139/60  Heart Rate:107  O2:100%-- 3LNC  Temperature:  Respirations:19    Telemetry: Box Number [id number]  Order for Tele Monitor? Yes    Additional Lines: Oxygen and Guevara Catheter    Medicines sent: n/a    Any special needs or follow-up needed: n/a    Patient belongings transferred with patient: No    Chart send with patient: Yes    Notified: son    Patient reassessed at: 11/25/24 @ 2100     Upon arrival to floor: cardiac monitor applied, patient oriented to room, call bell in reach, and bed in lowest position

## 2024-11-26 NOTE — ASSESSMENT & PLAN NOTE
Anemia is likely due to chronic disease due to Chronic Kidney Disease. Most recent hemoglobin and hematocrit are listed below.  Recent Labs     11/25/24  0722 11/25/24  2110 11/26/24  0554   HGB 11.2* 8.1* 8.0*   HCT 35.5* 24.7* 23.7*       Plan  - Monitor serial CBC: Daily  - Transfuse PRBC if patient becomes hemodynamically unstable, symptomatic or H/H drops below 7/21.  - Patient has not received any PRBC transfusions to date  - Patient's anemia is currently improving from baseline  Expected downtrend post op from 11- 8 and would continue to trend.

## 2024-11-26 NOTE — ASSESSMENT & PLAN NOTE
5 sutures in ER and covered on admit, due in 7-10 days for removal - dec 25th and can remove at post acute stay

## 2024-11-26 NOTE — NURSING
Report taken at  2151. Pt arrived to room at 2157. Pt A&Ox4, c/o 2/10 pain. Son-in-law with pt at bedside.

## 2024-11-26 NOTE — ASSESSMENT & PLAN NOTE
Zofran + compazine. [place] diet with starting to have some small tolerance of potatoes, jello

## 2024-11-26 NOTE — SUBJECTIVE & OBJECTIVE
Principal Problem:Closed intertrochanteric fracture of right femur    Principal Orthopedic Problem: same    Interval History: s/p R femur IMN on 11/25/24. Patient seen and examined at bedside. Nauseated this morning, reports several rounds of emesis. Otherwise doing fairly well. Pain well controlled, but she does endorse some mild burning paresthesias to R heel, for which she elevated heel to offload pressure. Anticipate mobilizing with PT today.     She also stated that today is her 's 89th birthday, so she is hopeful that he is able to come visit today      Review of patient's allergies indicates:   Allergen Reactions    Aspirin Other (See Comments)     Asthma    TRIAD OF NASAL POLYPS, ASA  ALLERGY AND ASTHMA.        Aspirin Other (See Comments)    Nsaids (non-steroidal anti-inflammatory drug) Other (See Comments)     AVOID DUE TO TRIAD OF ASA ALLERGY, NASAL POLYPS,AND ASTHMA  AVOID DUE TO TRIAD OF ASA ALLERGY, NASAL POLYPS,AND ASTHMA    Penicillin      Other reaction(s): Rash    9/30/20: tolerates ceftriaxone    Penicillin g Other (See Comments)     Other reaction(s): Rash    9/30/20: tolerates ceftriaxone       Current Facility-Administered Medications   Medication    0.9% NaCl infusion    acetaminophen tablet 1,000 mg    apixaban tablet 2.5 mg    artificial tears 0.5 % ophthalmic solution 1 drop    atorvastatin tablet 20 mg    bisacodyL suppository 10 mg    dextrose 10% bolus 125 mL 125 mL    dextrose 10% bolus 250 mL 250 mL    electrolytes-dextrose (Pedialyte) oral solution 400 mL    glucagon (human recombinant) injection 1 mg    glucose chewable tablet 16 g    glucose chewable tablet 24 g    hydrALAZINE tablet 25 mg    hydrALAZINE tablet 50 mg    insulin aspart U-100 pen 0-10 Units    insulin aspart U-100 pen 7 Units    insulin glargine U-100 (Lantus) pen 10 Units    latanoprost 0.005 % ophthalmic solution 1 drop    lipase-protease-amylase 24,000-76,000-120,000 units capsule 2 capsule    magnesium  "oxide tablet 400 mg    melatonin tablet 6 mg    methocarbamoL tablet 500 mg    montelukast tablet 10 mg    ondansetron injection 4 mg    polyethylene glycol packet 17 g    senna-docusate 8.6-50 mg per tablet 1 tablet    sodium bicarbonate tablet 1,300 mg    sodium chloride 0.9% flush 10 mL    vancomycin (VANCOCIN) 1,000 mg in 0.9% NaCl 250 mL IVPB (admixture device)    vitamin D 1000 units tablet 1,000 Units     Objective:     Vital Signs (Most Recent):  Temp: 97.9 °F (36.6 °C) (11/26/24 0937)  Pulse: 102 (11/26/24 0937)  Resp: 16 (11/26/24 0937)  BP: (!) 121/57 (11/26/24 0937)  SpO2: 98 % (11/26/24 0937) Vital Signs (24h Range):  Temp:  [97.2 °F (36.2 °C)-97.9 °F (36.6 °C)] 97.9 °F (36.6 °C)  Pulse:  [] 102  Resp:  [16-23] 16  SpO2:  [95 %-100 %] 98 %  BP: (113-214)/(57-83) 121/57     Weight: 53 kg (116 lb 13.5 oz)  Height: 5' 5" (165.1 cm)  Body mass index is 19.44 kg/m².      Intake/Output Summary (Last 24 hours) at 11/26/2024 1105  Last data filed at 11/26/2024 0800  Gross per 24 hour   Intake 1131 ml   Output 925 ml   Net 206 ml        Ortho/SPM Exam  AAOx4  NAD  Reg rate  No increased WOB    RLE:  Dressing c/d/i  SILT T/SP/DP/Stephens/Sa  Motor intact T/SP/DP  WWP extremities  FCDs in place and functioning       Significant Labs: CBC:   Recent Labs   Lab 11/25/24 0722 11/25/24 2110 11/26/24  0554   WBC 18.08* 10.81 10.81   HGB 11.2* 8.1* 8.0*   HCT 35.5* 24.7* 23.7*    SEE COMMENT SEE COMMENT     CMP:   Recent Labs   Lab 11/25/24 0722 11/26/24  0554    143   K 3.3* 4.5   * 115*   CO2 20* 13*   * 318*   BUN 24* 30*   CREATININE 1.0 1.7*   CALCIUM 9.5 8.0*   PROT 6.5  --    ALBUMIN 3.6  --    BILITOT 0.5  --    ALKPHOS 61  --    AST 29  --    ALT 29  --    ANIONGAP 9 15     All pertinent labs within the past 24 hours have been reviewed.    Significant Imaging: I have reviewed all pertinent imaging results/findings.  "

## 2024-11-26 NOTE — ASSESSMENT & PLAN NOTE
- . Trop 0.022  - ECG w/ Sinus rhythm with Premature atrial complexes   - echo ordered and pending  - tele  Suspect dry on admit given now with biju/nausea post op 11/26

## 2024-11-26 NOTE — ASSESSMENT & PLAN NOTE
- likely stress induced in setting of acute fracture, no s/s infectious process at this time as 18 on admit with normal UA and CXR, now normalized at 10

## 2024-11-26 NOTE — CONSULTS
Inpatient consult to Physical Medicine Rehab  Consult performed by: Marisol Hernández NP  Consult ordered by: Glenny Layton MD  Reason for consult: Rehab      Consult received.     MICHAELA Armando, FNP-C  Physical Medicine & Rehabilitation   11/26/2024

## 2024-11-26 NOTE — PLAN OF CARE
Problem: Skin Injury Risk Increased  Goal: Skin Health and Integrity  Outcome: Progressing     Problem: Hip Fracture Medical Management  Goal: Optimal Coping with Change in Health Status  Outcome: Progressing  Goal: Absence of Bleeding  Outcome: Progressing  Goal: Effective Bowel Elimination  Outcome: Progressing  Goal: Baseline Cognitive Function Maintained  Outcome: Progressing  Goal: Absence of Embolism  Outcome: Progressing  Goal: Fracture Stability  Outcome: Progressing  Goal: Optimal Functional Performance  Outcome: Progressing  Goal: Pain Control and Function  Outcome: Progressing  Goal: Effective Urinary Elimination  Outcome: Progressing     Problem: Surgery Nonspecified  Goal: Absence of Bleeding  Outcome: Progressing  Goal: Effective Bowel Elimination  Outcome: Progressing  Goal: Fluid and Electrolyte Balance  Outcome: Progressing  Goal: Blood Glucose Level Within Targeted Range  Outcome: Progressing  Goal: Absence of Infection Signs and Symptoms  Outcome: Progressing  Goal: Anesthesia/Sedation Recovery  Outcome: Progressing  Goal: Optimal Pain Control and Function  Outcome: Progressing  Goal: Nausea and Vomiting Relief  Outcome: Progressing  Goal: Effective Urinary Elimination  Outcome: Progressing  Goal: Effective Oxygenation and Ventilation  Outcome: Progressing     Problem: Anesthesia/Analgesia, Neuraxial  Goal: Safe, Effective Infusion Delivery  Outcome: Progressing  Goal: Absence of Infection Signs and Symptoms  Outcome: Progressing  Goal: Nausea and Vomiting Relief  Outcome: Progressing  Goal: Effective Pain Control  Outcome: Progressing  Goal: Effective Oxygenation and Ventilation  Outcome: Progressing  Goal: Baseline Motor Function  Outcome: Progressing  Goal: Effective Urinary Elimination  Outcome: Progressing     Problem: Fall Injury Risk  Goal: Absence of Fall and Fall-Related Injury  Outcome: Progressing     Problem: Adult Inpatient Plan of Care  Goal: Plan of Care Review  Outcome:  Progressing  Goal: Patient-Specific Goal (Individualized)  Outcome: Progressing  Goal: Absence of Hospital-Acquired Illness or Injury  Outcome: Progressing  Goal: Optimal Comfort and Wellbeing  Outcome: Progressing  Goal: Readiness for Transition of Care  Outcome: Progressing     Problem: Infection  Goal: Absence of Infection Signs and Symptoms  Outcome: Progressing     Problem: Diabetes Comorbidity  Goal: Blood Glucose Level Within Targeted Range  Outcome: Progressing     Problem: Wound  Goal: Optimal Coping  Outcome: Progressing  Goal: Optimal Functional Ability  Outcome: Progressing  Goal: Absence of Infection Signs and Symptoms  Outcome: Progressing  Goal: Improved Oral Intake  Outcome: Progressing  Goal: Optimal Pain Control and Function  Outcome: Progressing  Goal: Skin Health and Integrity  Outcome: Progressing  Goal: Optimal Wound Healing  Outcome: Progressing       Pt still in surgery

## 2024-11-26 NOTE — PT/OT/SLP PROGRESS
Occupational Therapy      Patient Name:  Sussy Costa   MRN:  [id number]    Patient not seen today secondary to nausea and vomiting this AM and ECHO on 2nd attempt. Unable to perform 3rd attempt. Will follow-up.    11/26/2024

## 2024-11-26 NOTE — NURSING
"POSS Plan of Care Note    Dx:   Syncope [R55]  Hip pain [M25.9]    Shift Events: No acute overnight events    Goals of Care: safety, pain control, surgical incision monitoring    Neuro: A&Ox4    Vital Signs: BP (!) 129/59 (Patient Position: Lying)   Pulse 98   Temp 97.6 °F (36.4 °C) (Oral)   Resp 18   Ht 5' 5" (1.651 m)   Wt 53 kg (116 lb 13.5 oz)   SpO2 99%   Breastfeeding No   BMI 19.44 kg/m²     Respiratory: 1L NC    Diet: Diet diabetic 2000 Calories (up to 75 gm per meal)    Is patient tolerating current diet? yes    GTTS: none    Urine Output/Bowel Movement:   I/O this shift:  In: 481 [P.O.:231; IV Piggyback:250]  Out: 350 [Urine:350]  Last Bowel Movement: 11/24/24    Drains/Tubes/Tube Feeds (include total output/shift):   I/O this shift:  In: 481 [P.O.:231; IV Piggyback:250]  Out: 350 [Urine:350]    Lines: 20# LAC    Accuchecks:AC/HS    Skin: Right leg surgical procedure, right arm skin tears, stitches     Fall Risk Score: 13    Activity level? bedrest    Procedures? Insertion of intramedullary valeri to right femur    Any safety concerns?      Other: PostOp [place] pt was hungry around 0230 but only wanted a Boost drink instead of a meal  "

## 2024-11-26 NOTE — PROGRESS NOTES
[facility]  Orthopedics  Progress Note    Patient Name: Sussy Costa  MRN: [id number]  Admission Date: 11/25/2024  Hospital Length of Stay: 1 days  Attending Provider: Glenny Layton MD  Primary Care Provider: Leon Ramírez MD  Follow-up For: Procedure(s) (LRB):  INSERTION, INTRAMEDULLARY CAYDEN, FEMUR (Right)    Post-Operative Day: 1 Day Post-Op  Subjective:     Principal Problem:Closed intertrochanteric fracture of right femur    Principal Orthopedic Problem: same    Interval History: s/p R femur IMN on 11/25/24. Patient seen and examined at bedside. Nauseated this morning, reports several rounds of emesis. Otherwise doing fairly well. Pain well controlled, but she does endorse some mild burning paresthesias to R heel, for which she elevated heel to offload pressure. Anticipate mobilizing with PT today.     She also stated that today is her 's 89th birthday, so she is hopeful that he is able to come visit today      Review of patient's allergies indicates:   Allergen Reactions    Aspirin Other (See Comments)     Asthma    TRIAD OF NASAL POLYPS, ASA  ALLERGY AND ASTHMA.        Aspirin Other (See Comments)    Nsaids (non-steroidal anti-inflammatory drug) Other (See Comments)     AVOID DUE TO TRIAD OF ASA ALLERGY, NASAL POLYPS,AND ASTHMA  AVOID DUE TO TRIAD OF ASA ALLERGY, NASAL POLYPS,AND ASTHMA    Penicillin      Other reaction(s): Rash    9/30/20: tolerates ceftriaxone    Penicillin g Other (See Comments)     Other reaction(s): Rash    9/30/20: tolerates ceftriaxone       Current Facility-Administered Medications   Medication    0.9% NaCl infusion    acetaminophen tablet 1,000 mg    apixaban tablet 2.5 mg    artificial tears 0.5 % ophthalmic solution 1 drop    atorvastatin tablet 20 mg    bisacodyL suppository 10 mg    dextrose 10% bolus 125 mL 125 mL    dextrose 10% bolus 250 mL 250 mL    electrolytes-dextrose (Pedialyte) oral solution 400 mL    glucagon (human recombinant) injection 1 mg     "glucose chewable tablet 16 g    glucose chewable tablet 24 g    hydrALAZINE tablet 25 mg    hydrALAZINE tablet 50 mg    insulin aspart U-100 pen 0-10 Units    insulin aspart U-100 pen 7 Units    insulin glargine U-100 (Lantus) pen 10 Units    latanoprost 0.005 % ophthalmic solution 1 drop    lipase-protease-amylase 24,000-76,000-120,000 units capsule 2 capsule    magnesium oxide tablet 400 mg    melatonin tablet 6 mg    methocarbamoL tablet 500 mg    montelukast tablet 10 mg    ondansetron injection 4 mg    polyethylene glycol packet 17 g    senna-docusate 8.6-50 mg per tablet 1 tablet    sodium bicarbonate tablet 1,300 mg    sodium chloride 0.9% flush 10 mL    vancomycin (VANCOCIN) 1,000 mg in 0.9% NaCl 250 mL IVPB (admixture device)    vitamin D 1000 units tablet 1,000 Units     Objective:     Vital Signs (Most Recent):  Temp: 97.9 °F (36.6 °C) (11/26/24 0937)  Pulse: 102 (11/26/24 0937)  Resp: 16 (11/26/24 0937)  BP: (!) 121/57 (11/26/24 0937)  SpO2: 98 % (11/26/24 0937) Vital Signs (24h Range):  Temp:  [97.2 °F (36.2 °C)-97.9 °F (36.6 °C)] 97.9 °F (36.6 °C)  Pulse:  [] 102  Resp:  [16-23] 16  SpO2:  [95 %-100 %] 98 %  BP: (113-214)/(57-83) 121/57     Weight: 53 kg (116 lb 13.5 oz)  Height: 5' 5" (165.1 cm)  Body mass index is 19.44 kg/m².      Intake/Output Summary (Last 24 hours) at 11/26/2024 1105  Last data filed at 11/26/2024 0800  Gross per 24 hour   Intake 1131 ml   Output 925 ml   Net 206 ml        Ortho/SPM Exam  AAOx4  NAD  Reg rate  No increased WOB    RLE:  Dressing c/d/i  SILT T/SP/DP/Stephens/Sa  Motor intact T/SP/DP  WWP extremities  FCDs in place and functioning       Significant Labs: CBC:   Recent Labs   Lab 11/25/24  0722 11/25/24  2110 11/26/24  0554   WBC 18.08* 10.81 10.81   HGB 11.2* 8.1* 8.0*   HCT 35.5* 24.7* 23.7*    SEE COMMENT SEE COMMENT     CMP:   Recent Labs   Lab 11/25/24  0722 11/26/24  0554    143   K 3.3* 4.5   * 115*   CO2 20* 13*   * 318*   BUN 24* " 30*   CREATININE 1.0 1.7*   CALCIUM 9.5 8.0*   PROT 6.5  --    ALBUMIN 3.6  --    BILITOT 0.5  --    ALKPHOS 61  --    AST 29  --    ALT 29  --    ANIONGAP 9 15     All pertinent labs within the past 24 hours have been reviewed.    Significant Imaging: I have reviewed all pertinent imaging results/findings.  Assessment/Plan:     * Closed intertrochanteric fracture of right femur  Sussy Costa is a 89 y.o. female with right IT femur fracture, closed, NVI. They take no anticoagulation at home. They required a cane prior to this injury.     Pain control: multimodal  PT/OT: WBAT RLE  DVT PPx: Eliquis 2.5 mg BID, SCDs at all times when not ambulating  Abx: postop Ancef  Labs: Hb 8.0, Cr 1.7. Encourage hydration, will continue to monitor.  Drain: none  Guevara: remove POD1    Dispo: f/u PT recs, likely dc to SNF            Denny Tran MD  Orthopedics  [facility] - Surgery

## 2024-11-26 NOTE — ASSESSMENT & PLAN NOTE
Osteoporosis with current pathological fracture   Senile osteoporosis     -sustained in mechanical fall and associated with known osteoporosis  -hip pathway started on admission with multimodal pain control, bowel regimen  -OR with ortho no 11/25 for IM nail with dr leanna quinonez post op for 35 days, end date 12/25  -ca out today  -PT/OT today and ref to rehab where she went after hip fx earlier this year as had great experience poor family preference  -hg 8 post op and trending  -cont vit D, needs osteoporosis clinic f/u as was already on prolia with new fx now

## 2024-11-27 PROBLEM — R07.82 INTERCOSTAL PAIN: Status: ACTIVE | Noted: 2024-11-27

## 2024-11-27 PROBLEM — N18.31 STAGE 3A CHRONIC KIDNEY DISEASE: Status: ACTIVE | Noted: 2023-12-07

## 2024-11-27 PROBLEM — E55.9 VITAMIN D DEFICIENCY: Status: ACTIVE | Noted: 2024-11-27

## 2024-11-27 PROBLEM — E87.6 HYPOKALEMIA: Status: RESOLVED | Noted: 2024-11-25 | Resolved: 2024-11-27

## 2024-11-27 PROBLEM — E87.20 LACTIC ACIDOSIS: Status: RESOLVED | Noted: 2024-11-26 | Resolved: 2024-11-27

## 2024-11-27 PROBLEM — R07.9 CHEST PAIN: Status: ACTIVE | Noted: 2024-11-27

## 2024-11-27 PROBLEM — D72.829 LEUKOCYTOSIS: Status: RESOLVED | Noted: 2024-11-25 | Resolved: 2024-11-27

## 2024-11-27 LAB
ANION GAP SERPL CALC-SCNC: 8 MMOL/L (ref 8–16)
ANION GAP SERPL CALC-SCNC: 8 MMOL/L (ref 8–16)
ANISOCYTOSIS BLD QL SMEAR: SLIGHT
BASOPHILS # BLD AUTO: 0.02 K/UL (ref 0–0.2)
BASOPHILS # BLD AUTO: 0.03 K/UL (ref 0–0.2)
BASOPHILS NFR BLD: 0.2 % (ref 0–1.9)
BASOPHILS NFR BLD: 0.2 % (ref 0–1.9)
BLD PROD TYP BPU: NORMAL
BLD PROD TYP BPU: NORMAL
BLOOD UNIT EXPIRATION DATE: NORMAL
BLOOD UNIT EXPIRATION DATE: NORMAL
BLOOD UNIT TYPE CODE: 6200
BLOOD UNIT TYPE CODE: 6200
BLOOD UNIT TYPE: NORMAL
BLOOD UNIT TYPE: NORMAL
BUN SERPL-MCNC: 48 MG/DL (ref 8–23)
BUN SERPL-MCNC: 49 MG/DL (ref 8–23)
CALCIUM SERPL-MCNC: 7.3 MG/DL (ref 8.7–10.5)
CALCIUM SERPL-MCNC: 7.5 MG/DL (ref 8.7–10.5)
CHLORIDE SERPL-SCNC: 109 MMOL/L (ref 95–110)
CHLORIDE SERPL-SCNC: 109 MMOL/L (ref 95–110)
CK SERPL-CCNC: 154 U/L (ref 20–180)
CO2 SERPL-SCNC: 20 MMOL/L (ref 23–29)
CO2 SERPL-SCNC: 20 MMOL/L (ref 23–29)
CODING SYSTEM: NORMAL
CODING SYSTEM: NORMAL
CREAT SERPL-MCNC: 1.8 MG/DL (ref 0.5–1.4)
CREAT SERPL-MCNC: 2.1 MG/DL (ref 0.5–1.4)
CROSSMATCH INTERPRETATION: NORMAL
CROSSMATCH INTERPRETATION: NORMAL
DACRYOCYTES BLD QL SMEAR: ABNORMAL
DIFFERENTIAL METHOD BLD: ABNORMAL
DIFFERENTIAL METHOD BLD: ABNORMAL
DISPENSE STATUS: NORMAL
DISPENSE STATUS: NORMAL
EOSINOPHIL # BLD AUTO: 0.1 K/UL (ref 0–0.5)
EOSINOPHIL # BLD AUTO: 0.2 K/UL (ref 0–0.5)
EOSINOPHIL NFR BLD: 0.9 % (ref 0–8)
EOSINOPHIL NFR BLD: 1.3 % (ref 0–8)
ERYTHROCYTE [DISTWIDTH] IN BLOOD BY AUTOMATED COUNT: 14.8 % (ref 11.5–14.5)
ERYTHROCYTE [DISTWIDTH] IN BLOOD BY AUTOMATED COUNT: 18.6 % (ref 11.5–14.5)
EST. GFR  (NO RACE VARIABLE): 22.2 ML/MIN/1.73 M^2
EST. GFR  (NO RACE VARIABLE): 26.8 ML/MIN/1.73 M^2
GIANT PLATELETS BLD QL SMEAR: PRESENT
GLUCOSE SERPL-MCNC: 126 MG/DL (ref 70–110)
GLUCOSE SERPL-MCNC: 165 MG/DL (ref 70–110)
HCT VFR BLD AUTO: 18.5 % (ref 37–48.5)
HCT VFR BLD AUTO: 22.9 % (ref 37–48.5)
HGB BLD-MCNC: 5.8 G/DL (ref 12–16)
HGB BLD-MCNC: 7.3 G/DL (ref 12–16)
HYPOCHROMIA BLD QL SMEAR: ABNORMAL
IMM GRANULOCYTES # BLD AUTO: 0.04 K/UL (ref 0–0.04)
IMM GRANULOCYTES # BLD AUTO: 0.1 K/UL (ref 0–0.04)
IMM GRANULOCYTES NFR BLD AUTO: 0.4 % (ref 0–0.5)
IMM GRANULOCYTES NFR BLD AUTO: 0.7 % (ref 0–0.5)
LYMPHOCYTES # BLD AUTO: 1.1 K/UL (ref 1–4.8)
LYMPHOCYTES # BLD AUTO: 1.5 K/UL (ref 1–4.8)
LYMPHOCYTES NFR BLD: 13.6 % (ref 18–48)
LYMPHOCYTES NFR BLD: 8.5 % (ref 18–48)
MAGNESIUM SERPL-MCNC: 1.8 MG/DL (ref 1.6–2.6)
MCH RBC QN AUTO: 29.7 PG (ref 27–31)
MCH RBC QN AUTO: 31.2 PG (ref 27–31)
MCHC RBC AUTO-ENTMCNC: 31.4 G/DL (ref 32–36)
MCHC RBC AUTO-ENTMCNC: 31.9 G/DL (ref 32–36)
MCV RBC AUTO: 100 FL (ref 82–98)
MCV RBC AUTO: 93 FL (ref 82–98)
MONOCYTES # BLD AUTO: 0.6 K/UL (ref 0.3–1)
MONOCYTES # BLD AUTO: 0.8 K/UL (ref 0.3–1)
MONOCYTES NFR BLD: 4.3 % (ref 4–15)
MONOCYTES NFR BLD: 7.9 % (ref 4–15)
NEUTROPHILS # BLD AUTO: 11.4 K/UL (ref 1.8–7.7)
NEUTROPHILS # BLD AUTO: 8.2 K/UL (ref 1.8–7.7)
NEUTROPHILS NFR BLD: 77 % (ref 38–73)
NEUTROPHILS NFR BLD: 85 % (ref 38–73)
NRBC BLD-RTO: 0 /100 WBC
NRBC BLD-RTO: 0 /100 WBC
OHS QRS DURATION: 124 MS
OHS QTC CALCULATION: 521 MS
OVALOCYTES BLD QL SMEAR: ABNORMAL
PHOSPHATE SERPL-MCNC: 4.2 MG/DL (ref 2.7–4.5)
PLATELET # BLD AUTO: 134 K/UL (ref 150–450)
PLATELET # BLD AUTO: 140 K/UL (ref 150–450)
PLATELET BLD QL SMEAR: ABNORMAL
PMV BLD AUTO: 11.9 FL (ref 9.2–12.9)
PMV BLD AUTO: 12.1 FL (ref 9.2–12.9)
POCT GLUCOSE: 155 MG/DL (ref 70–110)
POCT GLUCOSE: 180 MG/DL (ref 70–110)
POCT GLUCOSE: 202 MG/DL (ref 70–110)
POCT GLUCOSE: 227 MG/DL (ref 70–110)
POIKILOCYTOSIS BLD QL SMEAR: SLIGHT
POLYCHROMASIA BLD QL SMEAR: ABNORMAL
POTASSIUM SERPL-SCNC: 4.1 MMOL/L (ref 3.5–5.1)
POTASSIUM SERPL-SCNC: 4.2 MMOL/L (ref 3.5–5.1)
RBC # BLD AUTO: 1.86 M/UL (ref 4–5.4)
RBC # BLD AUTO: 2.46 M/UL (ref 4–5.4)
SODIUM SERPL-SCNC: 137 MMOL/L (ref 136–145)
SODIUM SERPL-SCNC: 137 MMOL/L (ref 136–145)
TRANS ERYTHROCYTES VOL PATIENT: NORMAL ML
TRANS ERYTHROCYTES VOL PATIENT: NORMAL ML
TROPONIN I SERPL DL<=0.01 NG/ML-MCNC: 0.05 NG/ML (ref 0–0.03)
TROPONIN I SERPL DL<=0.01 NG/ML-MCNC: 0.05 NG/ML (ref 0–0.03)
WBC # BLD AUTO: 10.7 K/UL (ref 3.9–12.7)
WBC # BLD AUTO: 13.36 K/UL (ref 3.9–12.7)

## 2024-11-27 PROCEDURE — 25000003 PHARM REV CODE 250: Performed by: HOSPITALIST

## 2024-11-27 PROCEDURE — 93010 ELECTROCARDIOGRAM REPORT: CPT | Mod: ,,, | Performed by: STUDENT IN AN ORGANIZED HEALTH CARE EDUCATION/TRAINING PROGRAM

## 2024-11-27 PROCEDURE — 83735 ASSAY OF MAGNESIUM: CPT | Performed by: HOSPITALIST

## 2024-11-27 PROCEDURE — 97161 PT EVAL LOW COMPLEX 20 MIN: CPT

## 2024-11-27 PROCEDURE — 84484 ASSAY OF TROPONIN QUANT: CPT | Mod: 91 | Performed by: HOSPITALIST

## 2024-11-27 PROCEDURE — 36415 COLL VENOUS BLD VENIPUNCTURE: CPT | Performed by: HOSPITALIST

## 2024-11-27 PROCEDURE — P9021 RED BLOOD CELLS UNIT: HCPCS | Performed by: HOSPITALIST

## 2024-11-27 PROCEDURE — 97535 SELF CARE MNGMENT TRAINING: CPT

## 2024-11-27 PROCEDURE — 80048 BASIC METABOLIC PNL TOTAL CA: CPT | Performed by: HOSPITALIST

## 2024-11-27 PROCEDURE — 85025 COMPLETE CBC W/AUTO DIFF WBC: CPT | Performed by: HOSPITALIST

## 2024-11-27 PROCEDURE — 84484 ASSAY OF TROPONIN QUANT: CPT

## 2024-11-27 PROCEDURE — 25000003 PHARM REV CODE 250

## 2024-11-27 PROCEDURE — 86920 COMPATIBILITY TEST SPIN: CPT | Performed by: HOSPITALIST

## 2024-11-27 PROCEDURE — 94761 N-INVAS EAR/PLS OXIMETRY MLT: CPT

## 2024-11-27 PROCEDURE — 36415 COLL VENOUS BLD VENIPUNCTURE: CPT

## 2024-11-27 PROCEDURE — 36430 TRANSFUSION BLD/BLD COMPNT: CPT

## 2024-11-27 PROCEDURE — 86920 COMPATIBILITY TEST SPIN: CPT

## 2024-11-27 PROCEDURE — P9021 RED BLOOD CELLS UNIT: HCPCS

## 2024-11-27 PROCEDURE — 97116 GAIT TRAINING THERAPY: CPT

## 2024-11-27 PROCEDURE — 84100 ASSAY OF PHOSPHORUS: CPT | Performed by: HOSPITALIST

## 2024-11-27 PROCEDURE — 21400001 HC TELEMETRY ROOM

## 2024-11-27 PROCEDURE — 30233N1 TRANSFUSION OF NONAUTOLOGOUS RED BLOOD CELLS INTO PERIPHERAL VEIN, PERCUTANEOUS APPROACH: ICD-10-PCS | Performed by: INTERNAL MEDICINE

## 2024-11-27 PROCEDURE — 93005 ELECTROCARDIOGRAM TRACING: CPT

## 2024-11-27 PROCEDURE — 82550 ASSAY OF CK (CPK): CPT | Performed by: HOSPITALIST

## 2024-11-27 PROCEDURE — 97165 OT EVAL LOW COMPLEX 30 MIN: CPT

## 2024-11-27 RX ORDER — INSULIN GLARGINE 100 [IU]/ML
5 INJECTION, SOLUTION SUBCUTANEOUS 2 TIMES DAILY
Status: DISCONTINUED | OUTPATIENT
Start: 2024-11-27 | End: 2024-11-29 | Stop reason: HOSPADM

## 2024-11-27 RX ORDER — SODIUM CHLORIDE 9 MG/ML
INJECTION, SOLUTION INTRAVENOUS CONTINUOUS
Status: ACTIVE | OUTPATIENT
Start: 2024-11-27 | End: 2024-11-27

## 2024-11-27 RX ORDER — HYDROCODONE BITARTRATE AND ACETAMINOPHEN 500; 5 MG/1; MG/1
TABLET ORAL
Status: DISCONTINUED | OUTPATIENT
Start: 2024-11-27 | End: 2024-11-29 | Stop reason: HOSPADM

## 2024-11-27 RX ORDER — ACETAMINOPHEN 500 MG
1000 TABLET ORAL EVERY 8 HOURS
Status: DISCONTINUED | OUTPATIENT
Start: 2024-11-28 | End: 2024-11-29 | Stop reason: HOSPADM

## 2024-11-27 RX ORDER — INSULIN GLARGINE 100 [IU]/ML
5 INJECTION, SOLUTION SUBCUTANEOUS DAILY
Status: DISCONTINUED | OUTPATIENT
Start: 2024-11-27 | End: 2024-11-27

## 2024-11-27 RX ADMIN — ATORVASTATIN CALCIUM 20 MG: 20 TABLET, FILM COATED ORAL at 08:11

## 2024-11-27 RX ADMIN — ACETAMINOPHEN 1000 MG: 500 TABLET ORAL at 05:11

## 2024-11-27 RX ADMIN — LATANOPROST 1 DROP: 50 SOLUTION OPHTHALMIC at 08:11

## 2024-11-27 RX ADMIN — SODIUM CHLORIDE: 9 INJECTION, SOLUTION INTRAVENOUS at 09:11

## 2024-11-27 RX ADMIN — METHOCARBAMOL 500 MG: 500 TABLET ORAL at 07:11

## 2024-11-27 RX ADMIN — Medication 400 MG: at 09:11

## 2024-11-27 RX ADMIN — PANCRELIPASE 2 CAPSULE: 120000; 24000; 76000 CAPSULE, DELAYED RELEASE PELLETS ORAL at 09:11

## 2024-11-27 RX ADMIN — ACETAMINOPHEN 1000 MG: 500 TABLET ORAL at 11:11

## 2024-11-27 RX ADMIN — PANCRELIPASE 2 CAPSULE: 120000; 24000; 76000 CAPSULE, DELAYED RELEASE PELLETS ORAL at 11:11

## 2024-11-27 RX ADMIN — POLYETHYLENE GLYCOL 3350 17 G: 17 POWDER, FOR SOLUTION ORAL at 09:11

## 2024-11-27 RX ADMIN — INSULIN ASPART 2 UNITS: 100 INJECTION, SOLUTION INTRAVENOUS; SUBCUTANEOUS at 10:11

## 2024-11-27 RX ADMIN — APIXABAN 2.5 MG: 2.5 TABLET, FILM COATED ORAL at 09:11

## 2024-11-27 RX ADMIN — SENNOSIDES AND DOCUSATE SODIUM 1 TABLET: 50; 8.6 TABLET ORAL at 08:11

## 2024-11-27 RX ADMIN — CHOLECALCIFEROL TAB 25 MCG (1000 UNIT) 1000 UNITS: 25 TAB at 09:11

## 2024-11-27 RX ADMIN — SENNOSIDES AND DOCUSATE SODIUM 1 TABLET: 50; 8.6 TABLET ORAL at 09:11

## 2024-11-27 RX ADMIN — MONTELUKAST 10 MG: 10 TABLET, FILM COATED ORAL at 08:11

## 2024-11-27 RX ADMIN — INSULIN ASPART 2 UNITS: 100 INJECTION, SOLUTION INTRAVENOUS; SUBCUTANEOUS at 09:11

## 2024-11-27 RX ADMIN — SODIUM BICARBONATE 1300 MG: 650 TABLET ORAL at 08:11

## 2024-11-27 RX ADMIN — INSULIN ASPART 2 UNITS: 100 INJECTION, SOLUTION INTRAVENOUS; SUBCUTANEOUS at 11:11

## 2024-11-27 RX ADMIN — INSULIN GLARGINE 5 UNITS: 100 INJECTION, SOLUTION SUBCUTANEOUS at 09:11

## 2024-11-27 RX ADMIN — SODIUM BICARBONATE 1300 MG: 650 TABLET ORAL at 03:11

## 2024-11-27 RX ADMIN — SODIUM BICARBONATE 1300 MG: 650 TABLET ORAL at 09:11

## 2024-11-27 RX ADMIN — BISACODYL 10 MG: 10 SUPPOSITORY RECTAL at 08:11

## 2024-11-27 RX ADMIN — INSULIN GLARGINE 5 UNITS: 100 INJECTION, SOLUTION SUBCUTANEOUS at 10:11

## 2024-11-27 RX ADMIN — APIXABAN 2.5 MG: 2.5 TABLET, FILM COATED ORAL at 08:11

## 2024-11-27 RX ADMIN — METHOCARBAMOL 500 MG: 500 TABLET ORAL at 05:11

## 2024-11-27 RX ADMIN — Medication 6 MG: at 08:11

## 2024-11-27 RX ADMIN — PANCRELIPASE 2 CAPSULE: 120000; 24000; 76000 CAPSULE, DELAYED RELEASE PELLETS ORAL at 05:11

## 2024-11-27 RX ADMIN — METHOCARBAMOL 500 MG: 500 TABLET ORAL at 01:11

## 2024-11-27 NOTE — ASSESSMENT & PLAN NOTE
Zofran + compazine. [place] diet with starting to have some small tolerance of potatoes, jello  Still present 11/27 and toleratign some bites of toast, grits. Abd soft and not distended, no signs of post op ileus on exam

## 2024-11-27 NOTE — ASSESSMENT & PLAN NOTE
- Dressing in place to R arm and RUE laceration repaired. Imaging revealed R femur intertrochanteric fracture.    - S/p R femur IMN on 11/25/24.   - Per Ortho recommend WBAT RLE  - Related to prolonged/acute hospital course.   -  Encourage mobility, OOB in chair at least 3 hours per day, and early ambulation as appropriate  -  PT/OT evaluate and treat  -  Pain management  -  Monitor for and prevent skin breakdown and pressure ulcers  Early mobility, repositioning/weight shifting every 20-30 minutes when sitting, turn patient every 2 hours, proper mattress/overlay and chair cushioning, pressure relief/heel protector boots  -  DVT prophylaxis

## 2024-11-27 NOTE — ASSESSMENT & PLAN NOTE
Does not sound cardiac in nature as on discussion with her today she said occurred with cough and was more bone pain and pleuritic type  Trop . 05 and repeat to ensure stability  Cxr repeat and EKG repeat no changes

## 2024-11-27 NOTE — ASSESSMENT & PLAN NOTE
Anemia is likely due to acute blood loss which was from surgery and expected . Most recent hemoglobin and hematocrit are listed below.  Recent Labs     11/25/24  2110 11/26/24  0554 11/27/24  0456   HGB 8.1* 8.0* 5.8*   HCT 24.7* 23.7* 18.5*       Plan  - Monitor serial CBC: Daily  - Transfuse PRBC if patient becomes hemodynamically unstable, symptomatic or H/H drops below 7/21.  - Patient has received 1 units of PRBCs on today  - Patient's anemia is currently  monitoring  Hg downtrend from 8 to 5.8 on 11/27 and 1 u ordered overnight and infusing on exam this AM. CBC ordered for once complete to assess if needs a 2nd unit

## 2024-11-27 NOTE — PLAN OF CARE
Problem: Occupational Therapy  Goal: Occupational Therapy Goal  Description: Goals to be met by: 12/27/24     Patient will increase functional independence with ADLs by performing:    UE Dressing with Stand-by Assistance.  LE Dressing with Minimal Assistance.  Grooming while standing at sink with Stand-by Assistance.  Toileting from toilet with Stand-by Assistance for hygiene and clothing management.   All functional transfers performed with SBA    Outcome: Progressing

## 2024-11-27 NOTE — PLAN OF CARE
Problem: Skin Injury Risk Increased  Goal: Skin Health and Integrity  Outcome: Progressing     Problem: Hip Fracture Medical Management  Goal: Optimal Coping with Change in Health Status  Outcome: Progressing  Goal: Absence of Bleeding  Outcome: Progressing  Goal: Effective Bowel Elimination  Outcome: Progressing  Goal: Baseline Cognitive Function Maintained  Outcome: Progressing     Pt is progressing towards plan of care goals. Pt reported moderate pain and muscle spasms to RLE. PRN medication given and effective. Educated pt on increased fluid intake to maintain hydration. Pt complained of chest pain; on call provider notified. Explained plan of care, pt verbalized understanding. No injury during shift, Side rails up x 3, call light by bedside.

## 2024-11-27 NOTE — PLAN OF CARE
PT eval completed- see note for details, goals and POC established.     Problem: Physical Therapy  Goal: Physical Therapy Goal  Description: Goals to be met by: 24     Patient will increase functional independence with mobility by performin. Supine to sit with Contact Guard Assistance  2. Sit to stand transfer with Contact Guard Assistance  3. Bed to chair transfer with Contact Guard Assistance using Rolling Walker  4. Gait  x 100 feet with Minimal Assistance using Rolling Walker.   5. Lower extremity exercise program x30 reps per handout, with independence    Outcome: Progressing  2024

## 2024-11-27 NOTE — PT/OT/SLP EVAL
Occupational Therapy   Evaluation    Name: Sussy Costa  MRN: [id number]  Admitting Diagnosis: Closed intertrochanteric fracture of right femur  Recent Surgery: Procedure(s) (LRB):  INSERTION, INTRAMEDULLARY CAYDEN, FEMUR (Right) 2 Days Post-Op    Recommendations:     Discharge Recommendations: High Intensity Therapy  Discharge Equipment Recommendations:  none  Barriers to discharge:  None    Assessment:   CO-TX with PT for pt safety and max participation with both disciplines.    Sussy Costa is a 89 y.o. female with a medical diagnosis of Closed intertrochanteric fracture of right femur.  She presents with RLE pain. Performance deficits affecting function: weakness, impaired self care skills, impaired functional mobility, gait instability, impaired balance, pain, impaired skin.  PTA, pt reports being Indp with ADLs and mobility. Upon eval, pt limited by pain and dizziness upon change in position.     Rehab Prognosis: Good; patient would benefit from acute skilled OT services to address these deficits and reach maximum level of function.       Plan:     Patient to be seen   to address the above listed problems via self-care/home management, therapeutic activities, therapeutic exercises  Plan of Care Expires: 12/27/24  Plan of Care Reviewed with: patient, family    Subjective     Chief Complaint: RLE/hip pain  Patient/Family Comments/goals: return home    Occupational Profile:  Living Environment: Lives with  , in H, 1 JAMESON and no hand rail, WIS in bathroom  Previous level of function: INDP  Equipment Used at Home: walker, rolling, cane, straight, wheelchair  Assistance upon Discharge: Spouse and family    Pain/Comfort:  Pain Rating 1: 8/10  Location - Side 1: Right  Location - Orientation 1: generalized  Location 1: hip  Pain Addressed 1: Pre-medicate for activity, Reposition, Distraction, Cessation of Activity  Pain Rating Post-Intervention 1: 7/10    Patients cultural, spiritual, Spiritism conflicts  given the current situation: no    Objective:     Communicated with: Nsg prior to session.  Patient found HOB elevated with telemetry, SCD upon OT entry to room.    General Precautions: Standard, fall  Orthopedic Precautions: RLE weight bearing as tolerated  Braces: N/A  Respiratory Status: Room air    Occupational Performance:    Bed Mobility:    Patient completed Supine to Sit with maximal assistance and 2 persons  Patient completed Sit to Supine with moderate assistance and 2 persons    Functional Mobility/Transfers:  Patient completed Sit <> Stand Transfer with moderate assistance and of 2 persons  with  rolling walker   Functional Mobility: Pt taking 2 lateral steps to HOB c/ Minx 2 using RW    Activities of Daily Living:  Upper Body Dressing: minimum assistance don gown for backside and adjust front gown    Cognitive/Visual Perceptual:  A&O x4    Physical Exam:  BUE WFL  Balance: Fair for standing    AMPAC 6 Click ADL:  AMPAC Total Score: 12    Treatment & Education:  Pt educated on role and purpose of therapy  Pt educated on goal setting  Pt educated on benefits of OOB activity  Pt educated on self advocacy   Pt educated on WB precautions     Patient left HOB elevated with all lines intact, call button in reach, and nsg notified    GOALS:   Multidisciplinary Problems       Occupational Therapy Goals          Problem: Occupational Therapy    Goal Priority Disciplines Outcome Interventions   Occupational Therapy Goal     OT, PT/OT Progressing    Description: Goals to be met by: 12/27/24     Patient will increase functional independence with ADLs by performing:    UE Dressing with Stand-by Assistance.  LE Dressing with Minimal Assistance.  Grooming while standing at sink with Stand-by Assistance.  Toileting from toilet with Stand-by Assistance for hygiene and clothing management.   All functional transfers performed with SBA                         History:     Past Medical History:   Diagnosis Date    Asthma      Cyst of pancreas     liver    Diabetes mellitus type II     Eczema of hand     Glaucoma     History of colon cancer 10/28/2022    Right sided colon cancer diagnosed in setting of LBO requiring urgent hemicolectomy 10/25/2022 with path showing pT4aN0 disease. CT chest 12/7/22 with possible lung metastases and MR liver with indeterminate liver lesions. 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 Patient elected for surveillance.       History of recurrent pneumonia 09/28/2020    Hyperlipidemia     Hypertension     Lichen planus     gums    Osteoporosis     Other chronic pancreatitis 03/01/2023    Pancreas cyst 03/18/2016    Pulmonary nodules     Spinal stenosis of lumbar region 08/30/2018         Past Surgical History:   Procedure Laterality Date    BRONCHOSCOPY N/A 05/13/2022    Procedure: Bronchoscopy;  Surgeon: [facility] Diagnostic Provider;  [facility];  Service: Anesthesiology;  Laterality: N/A;    CATARACT EXTRACTION, BILATERAL      CHOLECYSTECTOMY      COLECTOMY, RIGHT Right 10/25/2022    Procedure: COLECTOMY, RIGHT;  Surgeon: Jass Holman MD;  [facility];  Service: Colon and Rectal;  Laterality: Right;    COLONOSCOPY N/A 06/26/2023    Procedure: COLONOSCOPY;  Surgeon: Jass Holman MD;  Location: [facility] ENDO (2ND FLR);  Service: Endoscopy;  Laterality: N/A;  2nd floor -lung disease  referral Dr [id number] portal/mailed-GT  6/20/23- Precall confirmed- [place]    ENDOSCOPIC ULTRASOUND OF UPPER GASTROINTESTINAL TRACT N/A 04/22/2022    Procedure: ULTRASOUND, UPPER GI TRACT, ENDOSCOPIC;  Surgeon: Max Rosado MD;  Location: [facility] ENDO (2ND FLR);  Service: Endoscopy;  Laterality: N/A;  urgent EUS (linear) for abnormal CT scan with dilated PD and increased cyst of pancreas cyst.  pap 44 mmHg  4/13:fully vaccinated. instructions via portal-SC    EPIDURAL STEROID INJECTION INTO LUMBAR  SPINE N/A 11/08/2018    Procedure: Injection-steroid-epidural-lumbar L5-S1;  Surgeon: Nicci Osorio Jr., MD;  Location: [facility] PAIN MGT;  Service: Pain Management;  Laterality: N/A;    FUNCTIONAL ENDOSCOPIC SINUS SURGERY (FESS) USING COMPUTER-ASSISTED NAVIGATION N/A 06/17/2019    Procedure: FESS, USING COMPUTER-ASSISTED NAVIGATION;  Surgeon: Rosangela Isabel MD;  Location: [facility] OR;  Service: ENT;  Laterality: N/A;  video    HIP SURGERY Left 04/2024    HYSTERECTOMY      INTRAMEDULLARY RODDING OF FEMUR Left 03/31/2024    Procedure: INSERTION, INTRAMEDULLARY CAYDEN, FEMUR - Left,;  Surgeon: Nakul Walker MD;  Location: [facility] OR [address] [place] [address];  Service: Orthopedics;  Laterality: Left;    INTRAMEDULLARY RODDING OF FEMUR Right 11/25/2024    Procedure: INSERTION, INTRAMEDULLARY CAYDEN, FEMUR;  Surgeon: Lorenzo Blunt MD;  [facility];  Service: Orthopedics;  Laterality: Right;    nasal polyps         Time Tracking:     OT Date of Treatment: 11/27/24  OT Start Time: 1337  OT Stop Time: 1358  OT Total Time (min): 21 min    Billable Minutes:Evaluation 11  Therapeutic Activity 10    11/27/2024

## 2024-11-27 NOTE — PLAN OF CARE
[facility] - [facility]  Discharge Reassessment    Primary Care Provider: Leon Ramírez MD    Expected Discharge Date: 11/29/2024    Reassessment (most recent)       Discharge Reassessment - 11/27/24 1322          Discharge Reassessment    Assessment Type Discharge Planning Brief Assessment     Did the patient's condition or plan change since previous assessment? No     Discharge Plan discussed with: Patient     Communicated ANTOINE with patient/caregiver No     Discharge Plan A Rehab                     Patient to discharge to [facility] once medically stable. ANTOINE 11/29/24.    Alcira Hurtado RNCM  Case Management  [facility]  [phone]

## 2024-11-27 NOTE — ASSESSMENT & PLAN NOTE
Sussy Costa is a 89 y.o. female with right IT femur fracture, closed, NVI. They take no anticoagulation at home. They required a cane prior to this injury.     Pain control: multimodal  PT/OT: WBAT RLE  DVT PPx: Eliquis 2.5 mg BID, SCDs at all times when not ambulating  Abx: postop Ancef  Labs: Hb 5.8, 1 unit PRBC transfusing. Cr 2.1 from 1.7.  Drain: none  Guevara: remove    Dispo: f/u PT recs, likely dc to SNF pending medical improvement

## 2024-11-27 NOTE — CONSULTS
[facility] - [facility]  Physical Medicine & Rehab  Consult Note    Patient Name: Sussy Costa  MRN: [id number]  Admission Date: 11/25/2024  Hospital Length of Stay: 2 days  Attending Physician: Glenny Layton MD     Inpatient consult to Physical Medicine & Rehabilitation  Consult performed by: Marisol Hernández NP  Consult requested by:  Glenny Layton MD    Collaborating Physician: Mable Newton MD  Reason for Consult:  Assess rehabilitation needs      Consults  Subjective:     Principal Problem: Closed intertrochanteric fracture of right femur    HPI: Sussy Costa is an 88-year-old female with PMHx of Type 2 DM, CKD, HTN, pancreatic insufficiency, asthma, a fib (Eliquis), mechanical fall 3/30/2024 and found to have a intertrochanteric fracture of left femur. S/p CMN 3/31/24. Patient now [facility] after a mechanical fall walking to bathroom and fell on R hip and scraped R arm. CTH revealed no acute intracranial hemorrhage. Dressing in place to R arm and RUE laceration repaired. Imaging revealed R femur intertrochanteric fracture.  S/p R femur IMN on 11/25/24. Per Ortho recommend WBAT RLE. Hospital course complicated by nausea started on Zofran and Compazine. ECHO done and Ejection fraction is approximately 68%.     Functional History: Patient lives with  in a single story home with small step to enter.  Prior to admission, aMx. DME: was using a RW and now has been using a SPC.      Hospital Course: 11/26/24: PT and OT evals pending     Past Medical History:   Diagnosis Date    Asthma     Cyst of pancreas     liver    Diabetes mellitus type II     Eczema of hand     Glaucoma     History of colon cancer 10/28/2022    Right sided colon cancer diagnosed in setting of LBO requiring urgent hemicolectomy 10/25/2022 with path showing pT4aN0 disease. CT chest 12/7/22 with possible lung metastases and MR liver with indeterminate liver lesions. Subsequent liver MR less concerning for  metastases and lung biopsy showed typical carcinoid rather than metastatic colon cancer. Patient elected for surveillance.       History of recurrent pneumonia 09/28/2020    Hyperlipidemia     Hypertension     Lichen planus     gums    Osteoporosis     Other chronic pancreatitis 03/01/2023    Pancreas cyst 03/18/2016    Pulmonary nodules     Spinal stenosis of lumbar region 08/30/2018     Past Surgical History:   Procedure Laterality Date    BRONCHOSCOPY N/A 05/13/2022    Procedure: Bronchoscopy;  Surgeon: [facility] Diagnostic Provider;  [facility];  Service: Anesthesiology;  Laterality: N/A;    CATARACT EXTRACTION, BILATERAL      CHOLECYSTECTOMY      COLECTOMY, RIGHT Right 10/25/2022    Procedure: COLECTOMY, RIGHT;  Surgeon: Jass Holman MD;  Location: [facility] OR 2ND FLR;  Service: Colon and Rectal;  Laterality: Right;    COLONOSCOPY N/A 06/26/2023    Procedure: COLONOSCOPY;  Surgeon: Jass Holman MD;  Location: [facility] ENDO (2ND FLR);  Service: Endoscopy;  Laterality: N/A;  2nd floor -lung disease  referral Dr [id number]-gamr-vrqwm portal/mailed-GT  6/20/23- Precall confirmed- [place]    ENDOSCOPIC ULTRASOUND OF UPPER GASTROINTESTINAL TRACT N/A 04/22/2022    Procedure: ULTRASOUND, UPPER GI TRACT, ENDOSCOPIC;  Surgeon: Max Rosado MD;  Location: [facility] ENDO (2ND FLR);  Service: Endoscopy;  Laterality: N/A;  urgent EUS (linear) for abnormal CT scan with dilated PD and increased cyst of pancreas cyst.  pap 44 mmHg  4/13:fully vaccinated. instructions via portal-SC    EPIDURAL STEROID INJECTION INTO LUMBAR SPINE N/A 11/08/2018    Procedure: Injection-steroid-epidural-lumbar L5-S1;  Surgeon: Nicci Osorio Jr., MD;  Location: [facility] PAIN MGT;  Service: Pain Management;  Laterality: N/A;    FUNCTIONAL ENDOSCOPIC SINUS SURGERY (FESS) USING COMPUTER-ASSISTED NAVIGATION N/A 06/17/2019    Procedure: FESS, USING COMPUTER-ASSISTED NAVIGATION;  Surgeon: Rosangela Isabel MD;  Location: [facility] OR;  Service:  ENT;  Laterality: N/A;  video    HIP SURGERY Left 04/2024    HYSTERECTOMY      INTRAMEDULLARY RODDING OF FEMUR Left 03/31/2024    Procedure: INSERTION, INTRAMEDULLARY CAYDEN, FEMUR - Left,;  Surgeon: Nakul Walker MD;  Location: [facility] OR [address] [place] [address];  Service: Orthopedics;  Laterality: Left;    INTRAMEDULLARY RODDING OF FEMUR Right 11/25/2024    Procedure: INSERTION, INTRAMEDULLARY CAYDEN, FEMUR;  Surgeon: Lorenzo Blunt MD;  Location: [facility] OR [address] [place] [address];  Service: Orthopedics;  Laterality: Right;    nasal polyps       Review of patient's allergies indicates:   Allergen Reactions    Aspirin Other (See Comments)     Asthma    TRIAD OF NASAL POLYPS, ASA  ALLERGY AND ASTHMA.        Aspirin Other (See Comments)    Nsaids (non-steroidal anti-inflammatory drug) Other (See Comments)     AVOID DUE TO TRIAD OF ASA ALLERGY, NASAL POLYPS,AND ASTHMA  AVOID DUE TO TRIAD OF ASA ALLERGY, NASAL POLYPS,AND ASTHMA    Penicillin      Other reaction(s): Rash    9/30/20: tolerates ceftriaxone    Penicillin g Other (See Comments)     Other reaction(s): Rash    9/30/20: tolerates ceftriaxone       Scheduled Medications:    acetaminophen  1,000 mg Oral Q6H    apixaban  2.5 mg Oral BID    atorvastatin  20 mg Oral QHS    insulin glargine U-100  5 Units Subcutaneous Daily    latanoprost  1 drop Both Eyes QHS    lipase-protease-amylase 24,000-76,000-120,000 units  2 capsule Oral TID WM    magnesium oxide  400 mg Oral Daily    montelukast  10 mg Oral QHS    polyethylene glycol  17 g Oral Daily    senna-docusate 8.6-50 mg  1 tablet Oral BID    sodium bicarbonate  1,300 mg Oral TID    vitamin D  1,000 Units Oral Daily       PRN Medications:   Current Facility-Administered Medications:     0.9%  NaCl infusion (for blood administration), , Intravenous, Q24H PRN    artificial tears, 1 drop, Both Eyes, QID PRN    bisacodyL, 10 mg, Rectal, Daily PRN    dextrose 10%, 12.5 g, Intravenous, PRN    dextrose 10%, 25 g, Intravenous, PRN    glucagon (human  recombinant), 1 mg, Intramuscular, PRN    glucose, 16 g, Oral, PRN    glucose, 24 g, Oral, PRN    hydrALAZINE, 50 mg, Oral, Q8H PRN    insulin aspart U-100, 0-10 Units, Subcutaneous, QID (AC + HS) PRN    melatonin, 6 mg, Oral, Nightly PRN    methocarbamoL, 500 mg, Oral, Q6H PRN    ondansetron, 4 mg, Intravenous, Q6H PRN    prochlorperazine, 5 mg, Intravenous, Q6H PRN    sodium chloride 0.9%, 10 mL, Intravenous, PRN    Family History       Problem Relation (Age of Onset)    Heart disease Father, Brother    Hypertension Brother          Tobacco Use    Smoking status: Never     Passive exposure: Never    Smokeless tobacco: Never   Substance and Sexual Activity    Alcohol use: Yes     Comment: socially    Drug use: No    Sexual activity: Yes     Partners: Male     Birth control/protection: Post-menopausal     Review of Systems   Constitutional:  Positive for activity change. Negative for fatigue and fever.   HENT:  Negative for sore throat and trouble swallowing.    Eyes:  Negative for visual disturbance.   Respiratory:  Negative for cough and shortness of breath.    Cardiovascular:  Negative for chest pain and leg swelling.   Gastrointestinal:  Negative for abdominal distention and abdominal pain.   Genitourinary:  Negative for difficulty urinating.   Musculoskeletal:  Positive for gait problem. Negative for back pain.   Skin:  Negative for color change.   Neurological:  Positive for weakness. Negative for dizziness, light-headedness and headaches.   Psychiatric/Behavioral:  Negative for agitation and confusion.      Objective:     Vital Signs (Most Recent):  Temp: 97.7 °F (36.5 °C) (11/27/24 0842)  Pulse: 90 (11/27/24 0842)  Resp: 17 (11/27/24 0842)  BP: (!) 128/57 (11/27/24 0842)  SpO2: 95 % (11/27/24 0842)    Vital Signs (24h Range):  Temp:  [97.6 °F (36.4 °C)-98.5 °F (36.9 °C)] 97.7 °F (36.5 °C)  Pulse:  [] 90  Resp:  [16-18] 17  SpO2:  [93 %-100 %] 95 %  BP: ()/(50-60) 128/57     Body mass index is  19.44 kg/m².     Physical Exam  Vitals and nursing note reviewed.   Constitutional:       Appearance: Normal appearance. She is well-developed.   HENT:      Nose: Nose normal.   Eyes:      Pupils: Pupils are equal, round, and reactive to light.   Pulmonary:      Effort: Pulmonary effort is normal. No respiratory distress.   Abdominal:      General: Bowel sounds are normal.      Palpations: Abdomen is soft.   Musculoskeletal:      Cervical back: Normal range of motion and neck supple.      Comments: R arm with dressing in place  RLE weakness present    Skin:     General: Skin is warm and dry.   Neurological:      Mental Status: She is alert and oriented to person, place, and time. Mental status is at baseline.      Motor: Weakness present.      Gait: Gait abnormal.   Psychiatric:         Mood and Affect: Mood normal.         Behavior: Behavior normal.         Thought Content: Thought content normal.         Judgment: Judgment normal.     Diagnostic Results:   Labs: Reviewed  ECG: Reviewed  X-Ray: Reviewed    Assessment/Plan:     * Closed intertrochanteric fracture of right femur  - Dressing in place to R arm and RUE laceration repaired. Imaging revealed R femur intertrochanteric fracture.    - S/p R femur IMN on 11/25/24.   - Per Ortho recommend WBAT RLE  - Related to prolonged/acute hospital course.   -  Encourage mobility, OOB in chair at least 3 hours per day, and early ambulation as appropriate  -  PT/OT evaluate and treat  -  Pain management  -  Monitor for and prevent skin breakdown and pressure ulcers  Early mobility, repositioning/weight shifting every 20-30 minutes when sitting, turn patient every 2 hours, proper mattress/overlay and chair cushioning, pressure relief/heel protector boots  -  DVT prophylaxis    Paroxysmal atrial fibrillation  -  on Eliquis    Nausea  - started on Compazine and Zofran    Laceration of R forearm  - dressing to be changed daily    The PM&R team has reviewed this patient's  ongoing medical case including inpatient diagnosis, medical history, clinical examination, labs, vitals, current social and functional history. We will continue to follow for PT & OT evaluations, improvement in nausea, stable H&H, and pain control as a rehab candidate.     Thank you for your consult.     Marisol Hernández NP  Department of Physical Medicine & Rehab  [facility]

## 2024-11-27 NOTE — PLAN OF CARE
Came to check on her and nausea improving, able to tolerate a good bit of lunch- chicken, 2 cookies, and sides with lunch. Glucose 160 on BMP repeat after tx and Cr improved to 1.8 from 2.1. hg 7.3 after 1 U which has slightly higher than expected rise from 5.8 and given improving volume and cr improving would like to give 1 more unit to continue to resuscitate in setting of ADARSH and hyperdynamic EF so will give 1 more unit now and came to update on her ont his and she is amenable. Messaged nursing and let charge nurse know as well as plan. Having some muscle spasms intermittently and so charge nursing to bring her robaxin. She reports pain controlled with it so no need to inc dose at this time on discussion but can consider if doesn't relieve pain but currently once she gest does relieve pain.  Will trend glucose now that eating more as on lantus BID at home and have just on 5 units AM now and dont have on meal time novolog right now, and likely will need some titratino once consistently eating closer to home regimen and if will watch to see if will need to do any of that later tonight

## 2024-11-27 NOTE — PROGRESS NOTES
[facility] - [facility]  Orthopedics  Progress Note    Patient Name: Sussy Costa  MRN: [id number]  Admission Date: 11/25/2024  Hospital Length of Stay: 2 days  Attending Provider: Glenny Layton MD  Primary Care Provider: Leon Ramírez MD  Follow-up For: Procedure(s) (LRB):  INSERTION, INTRAMEDULLARY CAYDEN, FEMUR (Right)    Post-Operative Day: 2 Days Post-Op  Subjective:     Principal Problem:Closed intertrochanteric fracture of right femur    Principal Orthopedic Problem: same    Interval History: s/p R femur IMN on 11/25/24. Patient seen and examined at bedside. Reports nausea seems somewhat improved, but still having difficulty keeping food down. TTE performed yesterday. Hb 5.8 this am, 1 unit PRBC transfusing on my exam today. Anticipate mobilizing with PT today.         Review of patient's allergies indicates:   Allergen Reactions    Aspirin Other (See Comments)     Asthma    TRIAD OF NASAL POLYPS, ASA  ALLERGY AND ASTHMA.        Aspirin Other (See Comments)    Nsaids (non-steroidal anti-inflammatory drug) Other (See Comments)     AVOID DUE TO TRIAD OF ASA ALLERGY, NASAL POLYPS,AND ASTHMA  AVOID DUE TO TRIAD OF ASA ALLERGY, NASAL POLYPS,AND ASTHMA    Penicillin      Other reaction(s): Rash    9/30/20: tolerates ceftriaxone    Penicillin g Other (See Comments)     Other reaction(s): Rash    9/30/20: tolerates ceftriaxone       Current Facility-Administered Medications   Medication    0.9%  NaCl infusion (for blood administration)    acetaminophen tablet 1,000 mg    apixaban tablet 2.5 mg    artificial tears 0.5 % ophthalmic solution 1 drop    atorvastatin tablet 20 mg    bisacodyL suppository 10 mg    dextrose 10% bolus 125 mL 125 mL    dextrose 10% bolus 250 mL 250 mL    glucagon (human recombinant) injection 1 mg    glucose chewable tablet 16 g    glucose chewable tablet 24 g    hydrALAZINE tablet 50 mg    insulin aspart U-100 pen 0-10 Units    insulin glargine U-100 (Lantus) pen 5 Units    latanoprost 0.005 %  "ophthalmic solution 1 drop    lipase-protease-amylase 24,000-76,000-120,000 units capsule 2 capsule    magnesium oxide tablet 400 mg    melatonin tablet 6 mg    methocarbamoL tablet 500 mg    montelukast tablet 10 mg    ondansetron injection 4 mg    polyethylene glycol packet 17 g    prochlorperazine injection Soln 5 mg    senna-docusate 8.6-50 mg per tablet 1 tablet    sodium bicarbonate tablet 1,300 mg    sodium chloride 0.9% flush 10 mL    vitamin D 1000 units tablet 1,000 Units     Objective:     Vital Signs (Most Recent):  Temp: 98 °F (36.7 °C) (11/27/24 0654)  Pulse: 88 (11/27/24 0654)  Resp: 16 (11/27/24 0654)  BP: (!) 104/50 (11/27/24 0654)  SpO2: (!) 93 % (11/27/24 0654) Vital Signs (24h Range):  Temp:  [97.6 °F (36.4 °C)-98.5 °F (36.9 °C)] 98 °F (36.7 °C)  Pulse:  [] 88  Resp:  [16-18] 16  SpO2:  [93 %-100 %] 93 %  BP: ()/(50-58) 104/50     Weight: 53 kg (116 lb 13.5 oz)  Height: 5' 5" (165.1 cm)  Body mass index is 19.44 kg/m².      Intake/Output Summary (Last 24 hours) at 11/27/2024 0708  Last data filed at 11/27/2024 0510  Gross per 24 hour   Intake 1600 ml   Output 750 ml   Net 850 ml        Ortho/SPM Exam  AAOx4  NAD  Reg rate  No increased WOB    RLE:  Dressing c/d/i  SILT T/SP/DP/Stephens/Sa  Motor intact T/SP/DP  WWP extremities  FCDs in place and functioning       Significant Labs: CBC:   Recent Labs   Lab 11/25/24  2110 11/26/24  0554 11/27/24  0456   WBC 10.81 10.81 10.70   HGB 8.1* 8.0* 5.8*   HCT 24.7* 23.7* 18.5*   PLT SEE COMMENT SEE COMMENT 140*     CMP:   Recent Labs   Lab 11/25/24  0722 11/26/24  0554 11/26/24  1207 11/27/24  0456    143 142 137   K 3.3* 4.5 3.7 4.2   * 115* 113* 109   CO2 20* 13* 16* 20*   * 318* 219* 126*   BUN 24* 30* 40* 49*   CREATININE 1.0 1.7* 1.9* 2.1*   CALCIUM 9.5 8.0* 7.9* 7.3*   PROT 6.5  --   --   --    ALBUMIN 3.6  --   --   --    BILITOT 0.5  --   --   --    ALKPHOS 61  --   --   --    AST 29  --   --   --    ALT 29  --   --   --  "   ANIONGAP 9 15 13 8     All pertinent labs within the past 24 hours have been reviewed.    Significant Imaging: I have reviewed all pertinent imaging results/findings.  Assessment/Plan:     * Closed intertrochanteric fracture of right femur  Sussy Costa is a 89 y.o. female with right IT femur fracture, closed, NVI. They take no anticoagulation at home. They required a cane prior to this injury.     Pain control: multimodal  PT/OT: WBAT RLE  DVT PPx: Eliquis 2.5 mg BID, SCDs at all times when not ambulating  Abx: postop Ancef  Labs: Hb 5.8, 1 unit PRBC transfusing. Cr 2.1 from 1.7.  Drain: none  Guevara: remove    Dispo: f/u PT recs, likely dc to SNF pending medical improvement            Denny Tran MD  Orthopedics  [facility] - Surgery

## 2024-11-27 NOTE — ASSESSMENT & PLAN NOTE
Suspect from perfusion from poor intake as dka ruled out as cause with normal ph and only tick elevated ketones, with lactic acid 2--3.7 and oral intake very poor/nausea  -13 --16 on na bicarb tabs started 11/26 AM and increased IVF + Bolus this PM, repeat lactic acid 5 pm with normalized at that time  -bicarb now 20 and so will titrate down na bicarb tabs in coming days

## 2024-11-27 NOTE — NURSING
Pt complained of chest pain but denied SOB and dizziness. On call provider notified, stat EKG ordered. VSS. Tried to reposition patient to ease pain but was not effective. Pt complained of pain being constant but increases when she inhales deeply. On call ordered AM troponin lab and CXR.

## 2024-11-27 NOTE — HPI
Sussy Costa is an 88-year-old female with PMHx of Type 2 DM, CKD, HTN, pancreatic insufficiency, asthma, a fib (Eliquis), mechanical fall 3/30/2024 and found to have a intertrochanteric fracture of left femur. S/p CMN 3/31/24. Patient now presents to [facility] on 11/25/24 after a mechanical fall walking to bathroom and fell on R hip and scraped R arm. CTH revealed no acute intracranial hemorrhage. Dressing in place to R arm and RUE laceration repaired. Imaging revealed R femur intertrochanteric fracture.  S/p R femur IMN on 11/25/24. Per Ortho recommend WBAT RLE. Hospital course complicated by nausea started on Zofran and Compazine. ECHO done and Ejection fraction is approximately 68%. Right arm laceration: Gently remove old dressing to right arm; clean with wound cleanser and pat dry. Cover open skin tears with Urgotul AG, then foam, then secure with cast padding. Change dressing every 5 days unless saturated or loose and continue until skin tears have closed/healed.     Remove sutures from right arm laceration on 12/5/2024.     Functional History: Patient lives with  in a single story home with small step to enter.  Prior to admission, Max. DME: was using a RW and now has been using a SPC.

## 2024-11-27 NOTE — PROGRESS NOTES
[facility]  Progress Note    Patient Name: Sussy Costa  MRN: [id number]  Patient Class: IP- Inpatient   Admission Date: 11/25/2024  Length of Stay: 2 days  Attending Physician: Glenny Layton MD  Primary Care Provider: Leon Ramírez MD        Subjective:     Principal Problem:Closed intertrochanteric fracture of right femur        HPI:  Ms. Sussy Costa is an 89-year-old female w/ a h/o left intertrochanteric fracture s/p surgery 04/2024, Type 2 DM, CKD stage 3, HTN, pancreatic insufficiency, Asthma, Afib that presents for evaluation of hip pain. She was getting up to walk to the bathroom this morning an hour prior to arrival, took 2 steps and then felt herself falling. She fell onto her right side and attempted to grab her dresser and scraped her arm. She is unsure if she hit her head or had a syncopal event before falling. She denies any preceding chest pain, palpitations, lightheadedness or dizziness. She reports 8/10 right-sided hip pain that extends to mid thigh. She reports minimal blood loss from her arm. Bleeding was controlled with a dressing. She has not eaten since dinner at 6:00 pm yesterday. Sip of water at 11:00 p.m. yesterday for meds. She denied any chest pain, shortness of breath, abdominal pain. She did not take her medications this morning.     In ED, Pt hypertensive 228/105, otherwise AFVSS on RA. WBC 18, hgb 11.2. K 3.3. . Trop 0.022. XR b/l hips: Acute fracture of the right hip. CTH: No acute intracranial hemorrhage. CT C spine: Broad central disc herniation with stable extrusion and superior migration fine the C5 vertebral body flattens the traversing cord with presumably some encroachment on the exiting right C6 nerve root. No acute cervical fracture. Given IV morphine, IV antiemetics. RUE lac repaired.     Overview/Hospital Course:  No notes on file    Interval history- she wasn't feeling that good this morning on initial exam, overnight had some  pain to chest but reports it was more bone rico when she was coughing and pleuritic almost sounding and did not sound cardiac when discussing more with her, trop was checked and a tick up at  . 05 with no EKG or cxr changes and will repeat today to prove stability. Still nauseatd but able to eat a little of breakfast so far with some toast, grits and going slowly and antiemetics on board are helping. Bicarb improved to 20 now on na bicarb tabs and lactic acid normalized lastn ight with IVF. Cr however still has to reach plateau as 2.1 now from 1.7 and last admit had similar biju with plateau of that time at 2.1 so hopeful that may be reaching peak with IVF yesterday and today with rpeat labs this morning after blood finishes transfusing, as hg from 8 to 5.8 this AM and night team added 1 U but may need  a 2nd and so will repeat once finished to see if so. She was having pain with transfusion but no signs of infilrate and myself and nurse Hayden at bedside to assess and was running at lower rate. She is  a hard stick as infiltrated right arm yesterday with IVF< so he placed another one on left forearm that blwe unfortunately and then started the blood again int he left midline and pain had resolved and has had midline team consutled to see if other options as well givent hsi and likely hard stick worsening fro volume depletion causeing it hard to get an IV,. Echo consistent with this as hyperdynamic EF on echo yesterday and discussed this with her. Pain doing okay so far with multimodals. Recs for rehab once medically stable and working on multiple med issuse so far. Do have some improvements from yesterday- lactic acid improved, bicarb better, glucose better, with some things still need to improve- nausea/oral intake, hg/Cr but have had some progress I let her know. Plan to repeat trop/cbc/bmp once transfusion completes and will come check on her this afternoon as well. Adjusted down insulin given was on 10 U  yesterday and glucose lower this AM so adjusted down to 5 U as glucose 300s yesterday likely from steroids post op but home dose 5 BID so will start now with just 5 daily and see how much she eats today and how trends go to see if want to add to BID for tonight or just keep for daily until now. Holding any scheduled with meals for now given not consistent meal intake and can cover with just Sliding scale for now unless starts upticking.        Review of patient's allergies indicates:   Allergen Reactions    Aspirin Other (See Comments)     Asthma    TRIAD OF NASAL POLYPS, ASA  ALLERGY AND ASTHMA.        Aspirin Other (See Comments)    Nsaids (non-steroidal anti-inflammatory drug) Other (See Comments)     AVOID DUE TO TRIAD OF ASA ALLERGY, NASAL POLYPS,AND ASTHMA  AVOID DUE TO TRIAD OF ASA ALLERGY, NASAL POLYPS,AND ASTHMA    Penicillin      Other reaction(s): Rash    9/30/20: tolerates ceftriaxone    Penicillin g Other (See Comments)     Other reaction(s): Rash    9/30/20: tolerates ceftriaxone       No current facility-administered medications on file prior to encounter.     Current Outpatient Medications on File Prior to Encounter   Medication Sig    acetaminophen (TYLENOL) 500 MG tablet Take 2 tablets (1,000 mg total) by mouth every 8 (eight) hours. (Patient taking differently: Take 1,000 mg by mouth every 8 (eight) hours as needed for Pain.)    ascorbic acid, vitamin C, (VITAMIN C) 500 MG tablet Take 500 mg by mouth once daily.    atorvastatin (LIPITOR) 20 MG tablet Take 1 tablet (20 mg total) by mouth every evening.    calcium carbonate (TUMS ORAL) Take 2 tablets by mouth daily as needed (Heartburn).    denosumab (PROLIA) 60 mg/mL Syrg Inject 1 mL (60 mg total) into the skin every 6 (six) months. Hold at rehab (Patient taking differently: Inject 60 mg into the skin every 6 (six) months.)    empagliflozin (JARDIANCE) 10 mg tablet Take 1 tablet (10 mg total) by mouth once daily.    ferrous sulfate (IRON ORAL) Take  "1 tablet by mouth every other day.    fluticasone-umeclidin-vilanter (TRELEGY ELLIPTA) 200-62.5-25 mcg inhaler Inhale 1 puff into the lungs once daily.    insulin aspart U-100 (NOVOLOG FLEXPEN U-100 INSULIN) 100 unit/mL (3 mL) InPn pen Inject 5 Units into the skin 3 (three) times daily with meals. (Patient taking differently: Inject 3 units into skin every morning, 5 units at lunch & 4 units at supper.)    insulin glargine U-100, Lantus, (LANTUS SOLOSTAR U-100 INSULIN) 100 unit/mL (3 mL) InPn pen Inject 6 Units into the skin once daily AND 5 Units every evening. (Patient taking differently: Inject 5 Units into the skin once daily AND 5 Units every evening.)    latanoprost 0.005 % ophthalmic solution Place 1 drop into both eyes every evening.    lipase-protease-amylase 24,000-76,000-120,000 units (CREON) 24,000-76,000 -120,000 unit capsule TAKE 2 CAPSULES WITH MEALS AND 1 CAPSULE WITH SNACKS.    losartan (COZAAR) 100 MG tablet Take 1 tablet (100 mg total) by mouth once daily.    magnesium oxide (MAG-OX) 400 mg (241.3 mg magnesium) tablet Take 400 mg by mouth 2 (two) times daily.    montelukast (SINGULAIR) 10 mg tablet Take 1 tablet (10 mg total) by mouth every evening.    olopatadine (PATANOL) 0.1 % ophthalmic solution Place 1 drop into both eyes 2 (two) times daily as needed for Allergies. 1 Drops Ophthalmic Twice a day    senna-docusate 8.6-50 mg (PERICOLACE) 8.6-50 mg per tablet Take 1 tablet by mouth 2 (two) times daily. (Patient taking differently: Take 1 tablet by mouth every other day.)    UNABLE TO FIND BUDESONIDE 0.8 MG CAPS - Empty contents of 1 capsule into nasal irrigation system, add distilled water and salt pack then mix & irrigate once daily.    vitamin D (VITAMIN D3) 1000 units Tab Take 1,000 Units by mouth once daily.    ACCU-CHEK GUIDE TEST STRIPS Strp TEST FOUR TIMES DAILY WITH MEALS AND NIGHTLY    BD ULTRA-FINE SHORT PEN NEEDLE 31 gauge x 5/16" Ndle USE with insulin pen 5 TIMES A DAY    lancets " (ACCU-CHEK SOFTCLIX LANCETS) Misc 1 each by Misc.(Non-Drug; Combo Route) route 4 (four) times daily.    [DISCONTINUED] risedronate (ACTONEL) 35 MG tablet Take 1 tablet (35 mg total) by mouth every 7 days.     Family History       Problem Relation (Age of Onset)    Heart disease Father, Brother    Hypertension Brother          Tobacco Use    Smoking status: Never     Passive exposure: Never    Smokeless tobacco: Never   Substance and Sexual Activity    Alcohol use: Yes     Comment: socially    Drug use: No    Sexual activity: Yes     Partners: Male     Birth control/protection: Post-menopausal     Review of Systems   Constitutional:  Negative for activity change, chills, diaphoresis, fatigue and fever.   HENT:  Negative for congestion, rhinorrhea and sore throat.    Respiratory:  Negative for cough, chest tightness, shortness of breath and wheezing.    Cardiovascular:  Negative for chest pain, palpitations and leg swelling.   Gastrointestinal:  Positive for nausea and vomiting. Negative for abdominal distention, abdominal pain, blood in stool, constipation and diarrhea.   Genitourinary:  Negative for difficulty urinating, dysuria, frequency, hematuria and urgency.   Musculoskeletal:  Positive for arthralgias. Negative for back pain and neck stiffness.   Neurological:  Positive for syncope. Negative for dizziness, tremors, seizures, weakness, light-headedness, numbness and headaches.   Psychiatric/Behavioral:  Negative for agitation, confusion and hallucinations.      Objective:     Vital Signs (Most Recent):  Temp: 97.7 °F (36.5 °C) (11/27/24 0842)  Pulse: 90 (11/27/24 0842)  Resp: 17 (11/27/24 0842)  BP: (!) 128/57 (11/27/24 0842)  SpO2: 95 % (11/27/24 0842) Vital Signs (24h Range):  Temp:  [97.6 °F (36.4 °C)-98.5 °F (36.9 °C)] 97.7 °F (36.5 °C)  Pulse:  [79-99] 90  Resp:  [16-18] 17  SpO2:  [93 %-100 %] 95 %  BP: ()/(50-60) 128/57     Weight: 53 kg (116 lb 13.5 oz)  Body mass index is 19.44 kg/m².      Physical Exam  Vitals and nursing note reviewed.   Constitutional:       General: She is not in acute distress.     Appearance: She is well-developed. She is not diaphoretic.      Comments: Nauseated. Appears fatigued   HENT:      Head: Normocephalic and atraumatic.      Right Ear: External ear normal.      Left Ear: External ear normal.      Nose: Nose normal. No congestion.      Mouth/Throat:      Pharynx: Oropharynx is clear.   Eyes:      General: No scleral icterus.     Extraocular Movements: Extraocular movements intact.   Cardiovascular:      Rate and Rhythm: Normal rate and regular rhythm.      Pulses: Normal pulses.      Heart sounds: Normal heart sounds. No murmur heard.  Pulmonary:      Effort: Pulmonary effort is normal. No respiratory distress.      Breath sounds: Normal breath sounds. No wheezing or rales.   Abdominal:      General: Bowel sounds are normal. There is no distension.      Palpations: Abdomen is soft.      Tenderness: There is no abdominal tenderness. There is no guarding or rebound.   Musculoskeletal:      Cervical back: Neck supple.      Right lower leg: No edema.      Left lower leg: No edema.      Comments: Bandages to Right forearm with sutures placed underneath for skin tear on admit. Bandages to RLE. Blood infusion to IV in left AC fossa with no signs of infiltrate with pain reported to IV site.   Skin:     General: Skin is warm and dry.      Capillary Refill: Capillary refill takes less than 2 seconds.   Neurological:      General: No focal deficit present.      Mental Status: She is alert and oriented to person, place, and time. Mental status is at baseline.   Psychiatric:         Mood and Affect: Mood normal.         Behavior: Behavior normal.         Thought Content: Thought content normal.                Significant Labs: All pertinent labs within the past 24 hours have been reviewed.  CBC:   Recent Labs   Lab 11/25/24  2110 11/26/24  0554 11/27/24  0456   WBC 10.81 10.81  "10.70   HGB 8.1* 8.0* 5.8*   HCT 24.7* 23.7* 18.5*   PLT SEE COMMENT SEE COMMENT 140*     CMP:   Recent Labs   Lab 11/26/24  0554 11/26/24  1207 11/27/24  0456    142 137   K 4.5 3.7 4.2   * 113* 109   CO2 13* 16* 20*   * 219* 126*   BUN 30* 40* 49*   CREATININE 1.7* 1.9* 2.1*   CALCIUM 8.0* 7.9* 7.3*   ANIONGAP 15 13 8     Cardiac Markers:   No results for input(s): "CKMB", "MYOGLOBIN", "BNP", "TROPISTAT" in the last 48 hours.    Troponin:   Recent Labs   Lab 11/27/24  0456   TROPONINI 0.050*       Significant Imaging: I have reviewed all pertinent imaging results/findings within the past 24 hours.  Imaging Results              X-Ray Chest 1 View Pre-OP (Final result)  Result time 11/25/24 11:24:59      Final result by Johan Seo MD (11/25/24 11:24:59)                   Impression:      No significant intrathoracic abnormality.  No significant detrimental interval change in the appearance of the chest since 03/30/2024 at 09:18 is appreciated.      Electronically signed by: Johan Seo MD  Date:    11/25/2024  Time:    11:24               Narrative:    EXAMINATION:  XR CHEST 1 VIEW PRE-OP    CLINICAL HISTORY:  pre op;    TECHNIQUE:  One view    COMPARISON:  Comparison is made to 03/30/2024 at 21:18.    FINDINGS:  Allowing for magnification of the cardiomediastinal silhouette related to projection, the true heart size is at the upper limit of normal to minimally enlarged, and the cardiomediastinal silhouette has not changed appreciably since the examination referenced above.  Lung zones are stable since the prior exam, with no new areas of airspace consolidation or volume loss evident.  No pleural fluid.  No abnormal mediastinal widening, with tortuosity of the thoracic aorta and calcification in its wall again observed.  A skin fold is superimposed over the right hemithorax, but no pneumothorax is seen.  Incidental observations again include some scoliosis convex to the right in the upper " thoracic region and a minor deformity relating to an old healed fracture of the lateral aspect of the right 6th rib.                                       CT Head Without Contrast (Final result)  Result time 11/25/24 08:40:10      Final result by Kristian Harmon MD (11/25/24 08:40:10)                   Impression:      No acute intracranial hemorrhage/injury.      Electronically signed by: Kristian Harmon  Date:    11/25/2024  Time:    08:40               Narrative:    EXAMINATION:  CT HEAD WITHOUT CONTRAST    CLINICAL HISTORY:  Head trauma, minor (Age >= 65y);    TECHNIQUE:  Low dose axial images were obtained through the head.  Coronal and sagittal reformations were also performed. Contrast was not administered.    COMPARISON:  None.    FINDINGS:  There is no evidence of hydrocephalus, mass effect, intracranial hemorrhage or acute territorial infarct.    Decreased attenuation in the periventricular white matter is nonspecific but may reflect moderate chronic small vessel ischemic change.    No acute calvarial fracture is identified.  There is opacification of the frontal and residual anterior ethmoid air cells with postsurgical changes within the paranasal sinuses.                                       CT Cervical Spine Without Contrast (Final result)  Result time 11/25/24 08:47:01      Final result by Kristian Harmon MD (11/25/24 08:47:01)                   Impression:      No acute cervical fracture.    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      Electronically signed by: Kristian Harmon  Date:    11/25/2024  Time:    08:47               Narrative:    EXAMINATION:  CT CERVICAL SPINE WITHOUT CONTRAST    CLINICAL HISTORY:  Neck trauma (Age >= 65y);    TECHNIQUE:  Low dose axial images, sagittal and coronal reformations were performed though the  cervical spine.  Contrast was not administered.    COMPARISON:  None    FINDINGS:  There is no evidence of acute cervical fracture.    The prevertebral soft tissues are unremarkable.    Degenerative changes are identified within the cervical spine.  At C5-6 there is a broad central disc herniation with extrusion and superior migration by in the C5 vertebral body that results in flattening of the traversing cord and encroachment on the exiting right C6 nerve root.    Atherosclerotic vascular calcifications are identified at the carotid bifurcations bilaterally.                                       X-Ray Hips Bilateral 2 View Incl AP Pelvis (Final result)  Result time 11/25/24 08:20:04      Final result by Johan Miles MD (11/25/24 08:20:04)                   Impression:      Acute fracture of the right hip.      Electronically signed by: Johan Miles MD  Date:    11/25/2024  Time:    08:20               Narrative:    EXAMINATION:  XR HIPS BILATERAL 2 VIEW INCL AP PELVIS    CLINICAL HISTORY:  Pain in unspecified hip    TECHNIQUE:  AP view of the pelvis and frogleg lateral views of both hips were performed.    COMPARISON:  None 04/29/2024.    FINDINGS:  There is an  acute  comminuted  inter trochanteric fracture identified involving the right hip.    Postoperative changes of internal fixation of the left hip and femur identified as before.  No bone destruction.                                       Assessment/Plan:      * Closed intertrochanteric fracture of right femur  Osteoporosis with current pathological fracture   Senile osteoporosis     -sustained in mechanical fall and associated with known osteoporosis  -hip pathway started on admission with multimodal pain control, bowel regimen  -OR with ortho no 11/25 for IM nail with dr leanna quinonez post op for 35 days, end date 12/25  -PT/OT today and ref to rehab where she went after hip fx earlier this year as had great experience poor family preference  -hg 8  post op--5.8 and 1 U today for anemia post op and repeat cbc after to see if needs further transfusion today  -cont vit D, needs osteoporosis clinic f/u as was already on prolia with new fx now      Chest pain  Does not sound cardiac in nature as on discussion with her today she said occurred with cough and was more bone pain and pleuritic type  Trop . 05 and repeat to ensure stability  Cxr repeat and EKG repeat no changes      Lactic acidosis  See metabolic acidosis  resolved      Nausea  Zofran + compazine. [place] diet with starting to have some small tolerance of potatoes, jello  Still present 11/27 and toleratign some bites of toast, grits. Abd soft and not distended, no signs of post op ileus on exam      Metabolic acidosis  Suspect from perfusion from poor intake as dka ruled out as cause with normal ph and only tick elevated ketones, with lactic acid 2--3.7 and oral intake very poor/nausea  -13 --16 on na bicarb tabs started 11/26 AM and increased IVF + Bolus this PM, repeat lactic acid 5 pm with normalized at that time  -bicarb now 20 and so will titrate down na bicarb tabs in coming days      Laceration of right forearm  5 sutures in ER and covered on admit, due in 7-10 days for removal - dec 25th and can remove at post acute stay      Postural dizziness with presyncope  - . Trop 0.022  - ECG w/ Sinus rhythm with Premature atrial complexes   - echo ordered and showing hyperdynamic ef consistent with signs of volume down  - tele  Suspect dry on admit given now with biju/nausea post op 11/26    Normocytic anemia  Anemia is likely due to chronic disease due to Chronic Kidney Disease. Most recent hemoglobin and hematocrit are listed below.  Recent Labs     11/25/24  2110 11/26/24  0554 11/27/24  0456   HGB 8.1* 8.0* 5.8*   HCT 24.7* 23.7* 18.5*       Plan  - Monitor serial CBC: Daily  - Transfuse PRBC if patient becomes hemodynamically unstable, symptomatic or H/H drops below 7/21.  - Patient has received  1 units of PRBCs on today  - Patient's anemia is currently improving from baseline  Expected downtrend post op from 11- 8 --5.8 and see acute blood loss anemia for this    Abnormal computed tomography of cervical spine  - CT C spine: No acute cervical fracture. 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   - no symptoms but if develop would refer to OP NS clinic      Hypokalemia  Patient's most recent potassium results are listed below.   Recent Labs     11/25/24  0722 11/26/24  0554 11/26/24  1207   K 3.3* 4.5 3.7       Plan  - Replete potassium per protocol  - Monitor potassium Daily  - Patient's hypokalemia is stable    Leukocytosis  - likely stress induced in setting of acute fracture, no s/s infectious process at this time as 18 on admit with normal UA and CXR, now normalized at 10    ADARSH (acute kidney injury)  ADARSH is likely due to pre-renal azotemia due to intravascular volume depletion. Baseline creatinine is  1.0 on admit . Most recent creatinine and eGFR are listed below.  Recent Labs     11/26/24  0554 11/26/24  1207 11/27/24  0456   CREATININE 1.7* 1.9* 2.1*   EGFRNORACEVR 28.7* 25.1* 22.2*        Plan  - ADARSH is worsening. Will IVF added as not tolerating pedialyte well this AM due to nausea, and bolus given as well but having to hold due to poor IV access/infiltrate IV with elevated lactic acid likely from perfusion/intake, toelrating small bits of bland. Hold losartan and inc fluids this PM 11/26 once new IV obtained after bolus.  - Avoid nephrotoxins and renally dose meds for GFR listed above  - Monitor urine output, serial BMP, and adjust therapy as needed  -  Higher at 2.1 on 11/27 which was peak of ADARSH in April when had other hip done so hoping will plateau soon, likely hypovolemic from nausea/ late in day surgery at 6 pm the day of surgery, and  now with anemia and hg 5.8 as contributors. Lactic acidosis resolved and echo consistent with hyperdynamic EF suggests volume down as well  -hydrating orally somewhat but not enough to sustain yet with nausea so continue IVF 11/27 and 1 U blood. Repeat bmp after blood transfuses this AM.    Vitamin D insufficiency  - vit D supplement, vit D 28    Acute blood loss anemia  Anemia is likely due to acute blood loss which was from surgery and expected . Most recent hemoglobin and hematocrit are listed below.  Recent Labs     11/25/24  2110 11/26/24  0554 11/27/24  0456   HGB 8.1* 8.0* 5.8*   HCT 24.7* 23.7* 18.5*       Plan  - Monitor serial CBC: Daily  - Transfuse PRBC if patient becomes hemodynamically unstable, symptomatic or H/H drops below 7/21.  - Patient has received 1 units of PRBCs on today  - Patient's anemia is currently  monitoring  Hg downtrend from 8 to 5.8 on 11/27 and 1 u ordered overnight and infusing on exam this AM. CBC ordered for once complete to assess if needs a 2nd unit      Stage 3 chronic kidney disease  Creatine stable for now. BMP reviewed- noted Estimated Creatinine Clearance: 17.1 mL/min (A) (based on SCr of 1.9 mg/dL (H)). according to latest data. 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  Monitor UOP and serial BMP and adjust therapy as needed. Renally dose meds. Avoid nephrotoxic medications and procedures.  Baseline 1 now with ADARSH 11/26 see adarsh    Paroxysmal atrial fibrillation  Patient has paroxysmal (<7 days) atrial fibrillation. Patient is currently in sinus rhythm. [id number] Score: 4. The patients heart rate in the last 24 hours is as follows:  Pulse  Min: 75  Max: 89     Antiarrhythmics       Anticoagulants       Plan  - Replete lytes with a goal of K>4, Mg >2  - Patient's afib is currently controlled      Pancreatic insufficiency  - continue creon tidwm    Asthma, moderate persistent  - duonebs prn, continue controller  Trilegy not on formulary inpatient. Follows  closely with pulm    Senile osteoporosis  Associated with current fracture. On prolia at home, hold now and will need fragility clinic f/u      Glaucoma  - continue latanoprost    Diabetes mellitus type II  Patient's FSGs are controlled on current medication regimen.  Last A1c reviewed-   Lab Results   Component Value Date    HGBA1C 5.9 (H) 11/25/2024     Most recent fingerstick glucose reviewed-   Recent Labs   Lab 11/26/24  1521 11/26/24  1820 11/26/24  2141 11/27/24  0627   POCTGLUCOSE 129* 82 138* 155*       Current correctional scale  Medium  Maintain anti-hyperglycemic dose as follows-   Antihyperglycemics (From admission, onward)      Start     Stop Route Frequency Ordered    11/27/24 0900  insulin glargine U-100 (Lantus) pen 5 Units         -- SubQ Daily 11/27/24 0654    11/25/24 1057  insulin aspart U-100 pen 0-10 Units         -- SubQ Before meals & nightly PRN 11/25/24 1000          Hold Oral hypoglycemics while patient is in the hospital.  At home was on :  Medication Changes:   Jardiance 10 mg daily  Lantus 5 units in AM and 5 units in the PM   Novolog 3-5-4 units before meals   Add correction scale if needed.   Blood sugar 180 to 230 add 1 units   Blood sugar 231 to 280 add 2 units   Blood sugar greater than 281 add 3 units     -glucose high in 200s on 11/25 PM so 5 U given post op 11/25 and higher in 300s 11/26 so titrated up and rosa novolgo added but nauseated/poor oral intake so being covered with SSI now with improving from 300s-200s now. Vbg with ph 7.35 so not in dka as metabolic acidosis likely from perfusion/biju with elevated lactate likely from this as cause and not from DKA, beta hydroxy a small bit up but also hasn't eaten much at all in a day so has reason for ketones to be slightly up as well and ruled out dka as contributor right now for acidosis with other cause  -titrate further, steroid given intra-op likely contributor to highs as per note from April had similar issue post op then and  then has had labile glucoses with lows last admit as well so will watch closely for trends  -on bland diet now for nausea but will titrate back to dm when ready  -glucose improved later 11/26 PM and 11/27 am in lower 100s so dec dosing to 5 U for 11/27 AM and hold insulin with meals as not eating consisntely now, and then watch trends so see if need to add BID dosing back or with meals to reflect home dosing as above.    Primary hypertension  Patients blood pressure range in the last 24 hours was: BP  Min: 106/50  Max: 183/77.The patient's inpatient anti-hypertensive regimen is listed below:  Current Antihypertensives  hydrALAZINE tablet 50 mg, Every 8 hours PRN, Oral    Plan  Hold home ARB for ADARSH and PRN hydralazine if needed, BP lower normal now with volume depletion 11/26      VTE Risk Mitigation (From admission, onward)           Ordered     apixaban tablet 2.5 mg  2 times daily         11/25/24 1933     Place sequential compression device  Until discontinued         11/25/24 1626     Place sequential compression device  Until discontinued         11/25/24 1000                    Discharge Planning   ANTOINE: 11/29/2024     Code Status: Full Code   Is the patient medically ready for discharge?:     Reason for patient still in hospital (select all that apply): Patient trending condition  Discharge Plan A: Rehab                  Glenny Layton MD  Department of Hospital Medicine   [facility] - Surgery

## 2024-11-27 NOTE — ASSESSMENT & PLAN NOTE
ADARSH is likely due to pre-renal azotemia due to intravascular volume depletion. Baseline creatinine is  1.0 on admit . Most recent creatinine and eGFR are listed below.  Recent Labs     11/26/24  0554 11/26/24  1207 11/27/24  0456   CREATININE 1.7* 1.9* 2.1*   EGFRNORACEVR 28.7* 25.1* 22.2*        Plan  - ADARSH is worsening. Will IVF added as not tolerating pedialyte well this AM due to nausea, and bolus given as well but having to hold due to poor IV access/infiltrate IV with elevated lactic acid likely from perfusion/intake, toelrating small bits of bland. Hold losartan and inc fluids this PM 11/26 once new IV obtained after bolus.  - Avoid nephrotoxins and renally dose meds for GFR listed above  - Monitor urine output, serial BMP, and adjust therapy as needed  -  Higher at 2.1 on 11/27 which was peak of ADARSH in April when had other hip done so hoping will plateau soon, likely hypovolemic from nausea/ late in day surgery at 6 pm the day of surgery, and now with anemia and hg 5.8 as contributors. Lactic acidosis resolved and echo consistent with hyperdynamic EF suggests volume down as well  -hydrating orally somewhat but not enough to sustain yet with nausea so continue IVF 11/27 and 1 U blood. Repeat bmp after blood transfuses this AM.

## 2024-11-27 NOTE — HOSPITAL COURSE
11/28/24: Participated w/ PT. Sit <> Stand Transfer: moderate assistance from bedside chair using rolling walker. Bed <> Chair: moderate assistance and of 2 persons using rolling walker Ambulated 4 steps forward and 4 steps to bed RW modA x 2 ppl.

## 2024-11-27 NOTE — PT/OT/SLP EVAL
Physical Therapy Co-Evaluation and Treatment    OT present for coeval due to pt's multiple medical comorbidities and functional/cognition deficits requiring two skilled therapists to appropriately progress pt's musculoskeletal strength, neuromuscular control, and endurance while taking into consideration medical acuity and pt safety.    Patient Name:  Sussy Costa   MRN:  [id number]    Recommendations:     Discharge Recommendations: High Intensity Therapy   Discharge Equipment Recommendations: none   Barriers to discharge: None    Assessment:     Sussy Costa is a 89 y.o. female admitted with a medical diagnosis of Closed intertrochanteric fracture of right femur.  She presents with the following impairments/functional limitations: weakness, impaired endurance, impaired self care skills, impaired functional mobility, gait instability, impaired balance, decreased upper extremity function, decreased lower extremity function, orthopedic precautions, pain     Pt receptive and tolerated PT co-eval with OT well. Pt educated on WBAT: right lower extremity precautions prior to start of treatment with pt verbalizing understanding. Pt needed mod A of 2 persons to perform sit <> stand from EOB this session and min A of 2 persons to take 2 shuffling side steps with RW. Patient presents with good participation and motivation to return to prior level of function with high intensity therapy.  The patient demonstrates appropriate strength and fine motor control to participate in up to 3 hours or 15hrs of combined therapy post acute.    Rehab Prognosis: Good; patient would benefit from acute skilled PT services to address these deficits and reach maximum level of function.    Recent Surgery: Procedure(s) (LRB):  INSERTION, INTRAMEDULLARY CAYDEN, FEMUR (Right) 2 Days Post-Op    Plan:     During this hospitalization, patient to be seen  (Hip pathway 11/27, 11/28, 11/29, then 4x/week after pathway complete.) to address the identified  rehab impairments via gait training, therapeutic activities, therapeutic exercises, neuromuscular re-education and progress toward the following goals:    Plan of Care Expires:  12/27/24    Subjective     Chief Complaint: R hip pain  Patient/Family Comments/goals:    Pain/Comfort:  Pain Rating 1: 8/10  Location - Side 1: Right  Location - Orientation 1: generalized  Location 1: hip  Pain Addressed 1: Pre-medicate for activity, Reposition, Distraction, Cessation of Activity  Pain Rating Post-Intervention 1: 7/10    Patients cultural, spiritual, Muslim conflicts given the current situation: no    Patient History:     Living Environment: Pt lives with spouse in [facility] with threshold JAMESON. Bathroom: walk-in shower    Prior Level of Function: Mod I using SPC  DME owned: shower chair, grab bar, BSC, w/c, RW  Caregiver Assistance: spouse and son-in-law    Objective:     Communicated with RN prior to session.  Patient found HOB elevated with telemetry, SCD, Wilberto  upon PT entry to room.    General Precautions: Standard, fall  Orthopedic Precautions:RLE weight bearing as tolerated   Braces: N/A  Respiratory Status: Room air    Exams:  Gross Motor Coordination:  WFL  Sensation:    -       Intact  RLE ROM: WFL  RLE Strength: grossly 3/5 due to pain  LLE ROM: WFL  LLE Strength: WFL    Functional Mobility:    Bed Mobility:   Supine > Sit: maximal assistance and of 2 persons  Sit > Supine: moderate assistance and of 2 persons    Transfers:   Sit <> Stand Transfer: moderate assistance and of 2 persons from EOB using rolling walker     Balance:   Sitting balance: FAIR+: Maintains balance through MINIMAL excursions of active trunk motion  Standing balance:   POOR+: Needs MINIMAL assist to maintain  POOR+: Needs MIN (minimal ) assist during gait                 Gait:  Distance: ~2 shuffling side steps EOB to the R   Assistive Device: RW  Assistance Level: minimum assistance and of 2 persons  Gait Assessment: Pt took 2 small  shuffling side steps with narrow MOLLY. Verbal cues for step sequencing and manual cues for RW management      AM-PAC 6 CLICK MOBILITY  Total Score:9       Treatment & Education:  Pt educated on tip to reduce fall risk and safety with mobility and using call button for assistance from nursing staff with OOB mobility.  All questions answered within the scope of PT.  White board updated accordingly.    Patient left HOB elevated with all lines intact, call button in reach, and RN notified.    GOALS:   Multidisciplinary Problems       Physical Therapy Goals          Problem: Physical Therapy    Goal Priority Disciplines Outcome Interventions   Physical Therapy Goal     PT, PT/OT Progressing    Description: Goals to be met by: 24     Patient will increase functional independence with mobility by performin. Supine to sit with Contact Guard Assistance  2. Sit to stand transfer with Contact Guard Assistance  3. Bed to chair transfer with Contact Guard Assistance using Rolling Walker  4. Gait  x 100 feet with Minimal Assistance using Rolling Walker.   5. Lower extremity exercise program x30 reps per handout, with independence                         History:     Past Medical History:   Diagnosis Date    Asthma     Cyst of pancreas     liver    Diabetes mellitus type II     Eczema of hand     Glaucoma     History of colon cancer 10/28/2022    Right sided colon cancer diagnosed in setting of LBO requiring urgent hemicolectomy 10/25/2022 with path showing [id number] disease. CT chest 22 with possible lung metastases and MR liver with indeterminate liver lesions. 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 Patient elected for surveillance.       History of recurrent pneumonia 2020    Hyperlipidemia     Hypertension     Lichen planus     gums    Osteoporosis     Other chronic pancreatitis 2023    Pancreas cyst 2016     Pulmonary nodules     Spinal stenosis of lumbar region 08/30/2018       Past Surgical History:   Procedure Laterality Date    BRONCHOSCOPY N/A 05/13/2022    Procedure: Bronchoscopy;  Surgeon: [facility] Diagnostic Provider;  Location: [address];  Service: Anesthesiology;  Laterality: N/A;    CATARACT EXTRACTION, BILATERAL      CHOLECYSTECTOMY      COLECTOMY, RIGHT Right 10/25/2022    Procedure: COLECTOMY, RIGHT;  Surgeon: Jass Holman MD;  [facility];  Service: Colon and Rectal;  Laterality: Right;    COLONOSCOPY N/A 06/26/2023    Procedure: COLONOSCOPY;  Surgeon: Jass Holman MD;  Location: [facility] ENDO (2ND FLR);  Service: Endoscopy;  Laterality: N/A;  2nd floor -lung disease  referral Dr [id number] portal/mailed-GT  6/20/23- Prec[place] confirmed- [place]    ENDOSCOPIC ULTRASOUND OF UPPER GASTROINTESTINAL TRACT N/A 04/22/2022    Procedure: ULTRASOUND, UPPER GI TRACT, ENDOSCOPIC;  Surgeon: Max Rosado MD;  [facility]  Service: Endoscopy;  Laterality: N/A;  urgent EUS (linear) for abnormal CT scan with dilated PD and increased cyst of pancreas cyst.  pap 44 mmHg  4/13:fully vaccinated. instructions via portal-SC    EPIDURAL STEROID INJECTION INTO LUMBAR SPINE N/A 11/08/2018    Procedure: Injection-steroid-epidural-lumbar L5-S1;  Surgeon: Nicci Osorio Jr., MD;  Location: [facility] PAIN MGT;  Service: Pain Management;  Laterality: N/A;    FUNCTIONAL ENDOSCOPIC SINUS SURGERY (FESS) USING COMPUTER-ASSISTED NAVIGATION N/A 06/17/2019    Procedure: FESS, USING COMPUTER-ASSISTED NAVIGATION;  Surgeon: Rosangela Isabel MD;  Location: [facility] OR;  Service: ENT;  Laterality: N/A;  video    HIP SURGERY Left 04/2024    HYSTERECTOMY      INTRAMEDULLARY RODDING OF FEMUR Left 03/31/2024    Procedure: INSERTION, INTRAMEDULLARY CAYDEN, FEMUR - Left,;  Surgeon: Nakul Walker MD;  Location: [address];  Service: Orthopedics;  Laterality: Left;    INTRAMEDULLARY RODDING  OF FEMUR Right 11/25/2024    Procedure: INSERTION, INTRAMEDULLARY CAYDEN, FEMUR;  Surgeon: Lorenzo Blunt MD;  Location: [facility] OR [address] [place] [address];  Service: Orthopedics;  Laterality: Right;    nasal polyps         Time Tracking:     PT Received On: 11/27/24  PT Start Time: 1338     PT Stop Time: 1357  PT Total Time (min): 19 min     Billable Minutes: Evaluation 10 and Gait Training 9      11/27/2024

## 2024-11-27 NOTE — SUBJECTIVE & OBJECTIVE
Interval history- she wasn't feeling that good this morning on initial exam, overnight had some pain to chest but reports it was more bone rico when she was coughing and pleuritic almost sounding and did not sound cardiac when discussing more with her, trop was checked and a tick up at  . 05 with no EKG or cxr changes and will repeat today to prove stability. Still nauseatd but able to eat a little of breakfast so far with some toast, grits and going slowly and antiemetics on board are helping. Bicarb improved to 20 now on na bicarb tabs and lactic acid normalized lastn ight with IVF. Cr however still has to reach plateau as 2.1 now from 1.7 and last admit had similar biju with plateau of that time at 2.1 so hopeful that may be reaching peak with IVF yesterday and today with rpeat labs this morning after blood finishes transfusing, as hg from 8 to 5.8 this AM and night team added 1 U but may need  a 2nd and so will repeat once finished to see if so. She was having pain with transfusion but no signs of infilrate and myself and nurse Hayden at bedside to assess and was running at lower rate. She is  a hard stick as infiltrated right arm yesterday with IVF< so he placed another one on left forearm that blwe unfortunately and then started the blood again int he left midline and pain had resolved and has had midline team consutled to see if other options as well givent hsi and likely hard stick worsening fro volume depletion causeing it hard to get an IV,. Echo consistent with this as hyperdynamic EF on echo yesterday and discussed this with her. Pain doing okay so far with multimodals. Recs for rehab once medically stable and working on multiple med issuse so far. Do have some improvements from yesterday- lactic acid improved, bicarb better, glucose better, with some things still need to improve- nausea/oral intake, hg/Cr but have had some progress I let her know. Plan to repeat trop/cbc/bmp once transfusion completes  and will come check on her this afternoon as well. Adjusted down insulin given was on 10 U yesterday and glucose lower this AM so adjusted down to 5 U as glucose 300s yesterday likely from steroids post op but home dose 5 BID so will start now with just 5 daily and see how much she eats today and how trends go to see if want to add to BID for tonight or just keep for daily until now. Holding any scheduled with meals for now given not consistent meal intake and can cover with just Sliding scale for now unless starts upticking.        Review of patient's allergies indicates:   Allergen Reactions    Aspirin Other (See Comments)     Asthma    TRIAD OF NASAL POLYPS, ASA  ALLERGY AND ASTHMA.        Aspirin Other (See Comments)    Nsaids (non-steroidal anti-inflammatory drug) Other (See Comments)     AVOID DUE TO TRIAD OF ASA ALLERGY, NASAL POLYPS,AND ASTHMA  AVOID DUE TO TRIAD OF ASA ALLERGY, NASAL POLYPS,AND ASTHMA    Penicillin      Other reaction(s): Rash    9/30/20: tolerates ceftriaxone    Penicillin g Other (See Comments)     Other reaction(s): Rash    9/30/20: tolerates ceftriaxone       No current facility-administered medications on file prior to encounter.     Current Outpatient Medications on File Prior to Encounter   Medication Sig    acetaminophen (TYLENOL) 500 MG tablet Take 2 tablets (1,000 mg total) by mouth every 8 (eight) hours. (Patient taking differently: Take 1,000 mg by mouth every 8 (eight) hours as needed for Pain.)    ascorbic acid, vitamin C, (VITAMIN C) 500 MG tablet Take 500 mg by mouth once daily.    atorvastatin (LIPITOR) 20 MG tablet Take 1 tablet (20 mg total) by mouth every evening.    calcium carbonate (TUMS ORAL) Take 2 tablets by mouth daily as needed (Heartburn).    denosumab (PROLIA) 60 mg/mL Syrg Inject 1 mL (60 mg total) into the skin every 6 (six) months. Hold at rehab (Patient taking differently: Inject 60 mg into the skin every 6 (six) months.)    empagliflozin (JARDIANCE) 10 mg  tablet Take 1 tablet (10 mg total) by mouth once daily.    ferrous sulfate (IRON ORAL) Take 1 tablet by mouth every other day.    fluticasone-umeclidin-vilanter (TRELEGY ELLIPTA) 200-62.5-25 mcg inhaler Inhale 1 puff into the lungs once daily.    insulin aspart U-100 (NOVOLOG FLEXPEN U-100 INSULIN) 100 unit/mL (3 mL) InPn pen Inject 5 Units into the skin 3 (three) times daily with meals. (Patient taking differently: Inject 3 units into skin every morning, 5 units at lunch & 4 units at supper.)    insulin glargine U-100, Lantus, (LANTUS SOLOSTAR U-100 INSULIN) 100 unit/mL (3 mL) InPn pen Inject 6 Units into the skin once daily AND 5 Units every evening. (Patient taking differently: Inject 5 Units into the skin once daily AND 5 Units every evening.)    latanoprost 0.005 % ophthalmic solution Place 1 drop into both eyes every evening.    lipase-protease-amylase 24,000-76,000-120,000 units (CREON) 24,000-76,000 -120,000 unit capsule TAKE 2 CAPSULES WITH MEALS AND 1 CAPSULE WITH SNACKS.    losartan (COZAAR) 100 MG tablet Take 1 tablet (100 mg total) by mouth once daily.    magnesium oxide (MAG-OX) 400 mg (241.3 mg magnesium) tablet Take 400 mg by mouth 2 (two) times daily.    montelukast (SINGULAIR) 10 mg tablet Take 1 tablet (10 mg total) by mouth every evening.    olopatadine (PATANOL) 0.1 % ophthalmic solution Place 1 drop into both eyes 2 (two) times daily as needed for Allergies. 1 Drops Ophthalmic Twice a day    senna-docusate 8.6-50 mg (PERICOLACE) 8.6-50 mg per tablet Take 1 tablet by mouth 2 (two) times daily. (Patient taking differently: Take 1 tablet by mouth every other day.)    UNABLE TO FIND BUDESONIDE 0.8 MG CAPS - Empty contents of 1 capsule into nasal irrigation system, add distilled water and salt pack then mix & irrigate once daily.    vitamin D (VITAMIN D3) 1000 units Tab Take 1,000 Units by mouth once daily.    ACCU-CHEK GUIDE TEST STRIPS Strp TEST FOUR TIMES DAILY WITH MEALS AND NIGHTLY    BD  "ULTRA-FINE SHORT PEN NEEDLE 31 gauge x 5/16" Ndle USE with insulin pen 5 TIMES A DAY    lancets (ACCU-CHEK SOFTCLIX LANCETS) Misc 1 each by Misc.(Non-Drug; Combo Route) route 4 (four) times daily.    [DISCONTINUED] risedronate (ACTONEL) 35 MG tablet Take 1 tablet (35 mg total) by mouth every 7 days.     Family History       Problem Relation (Age of Onset)    Heart disease Father, Brother    Hypertension Brother          Tobacco Use    Smoking status: Never     Passive exposure: Never    Smokeless tobacco: Never   Substance and Sexual Activity    Alcohol use: Yes     Comment: socially    Drug use: No    Sexual activity: Yes     Partners: Male     Birth control/protection: Post-menopausal     Review of Systems   Constitutional:  Negative for activity change, chills, diaphoresis, fatigue and fever.   HENT:  Negative for congestion, rhinorrhea and sore throat.    Respiratory:  Negative for cough, chest tightness, shortness of breath and wheezing.    Cardiovascular:  Negative for chest pain, palpitations and leg swelling.   Gastrointestinal:  Positive for nausea and vomiting. Negative for abdominal distention, abdominal pain, blood in stool, constipation and diarrhea.   Genitourinary:  Negative for difficulty urinating, dysuria, frequency, hematuria and urgency.   Musculoskeletal:  Positive for arthralgias. Negative for back pain and neck stiffness.   Neurological:  Positive for syncope. Negative for dizziness, tremors, seizures, weakness, light-headedness, numbness and headaches.   Psychiatric/Behavioral:  Negative for agitation, confusion and hallucinations.      Objective:     Vital Signs (Most Recent):  Temp: 97.7 °F (36.5 °C) (11/27/24 0842)  Pulse: 90 (11/27/24 0842)  Resp: 17 (11/27/24 0842)  BP: (!) 128/57 (11/27/24 0842)  SpO2: 95 % (11/27/24 0842) Vital Signs (24h Range):  Temp:  [97.6 °F (36.4 °C)-98.5 °F (36.9 °C)] 97.7 °F (36.5 °C)  Pulse:  [79-99] 90  Resp:  [16-18] 17  SpO2:  [93 %-100 %] 95 %  BP: " ()/(50-60) 128/57     Weight: 53 kg (116 lb 13.5 oz)  Body mass index is 19.44 kg/m².     Physical Exam  Vitals and nursing note reviewed.   Constitutional:       General: She is not in acute distress.     Appearance: She is well-developed. She is not diaphoretic.      Comments: Nauseated. Appears fatigued   HENT:      Head: Normocephalic and atraumatic.      Right Ear: External ear normal.      Left Ear: External ear normal.      Nose: Nose normal. No congestion.      Mouth/Throat:      Pharynx: Oropharynx is clear.   Eyes:      General: No scleral icterus.     Extraocular Movements: Extraocular movements intact.   Cardiovascular:      Rate and Rhythm: Normal rate and regular rhythm.      Pulses: Normal pulses.      Heart sounds: Normal heart sounds. No murmur heard.  Pulmonary:      Effort: Pulmonary effort is normal. No respiratory distress.      Breath sounds: Normal breath sounds. No wheezing or rales.   Abdominal:      General: Bowel sounds are normal. There is no distension.      Palpations: Abdomen is soft.      Tenderness: There is no abdominal tenderness. There is no guarding or rebound.   Musculoskeletal:      Cervical back: Neck supple.      Right lower leg: No edema.      Left lower leg: No edema.      Comments: Bandages to Right forearm with sutures placed underneath for skin tear on admit. Bandages to RLE. Blood infusion to IV in left AC fossa with no signs of infiltrate with pain reported to IV site.   Skin:     General: Skin is warm and dry.      Capillary Refill: Capillary refill takes less than 2 seconds.   Neurological:      General: No focal deficit present.      Mental Status: She is alert and oriented to person, place, and time. Mental status is at baseline.   Psychiatric:         Mood and Affect: Mood normal.         Behavior: Behavior normal.         Thought Content: Thought content normal.                Significant Labs: All pertinent labs within the past 24 hours have been  "reviewed.  CBC:   Recent Labs   Lab 11/25/24  2110 11/26/24  0554 11/27/24  0456   WBC 10.81 10.81 10.70   HGB 8.1* 8.0* 5.8*   HCT 24.7* 23.7* 18.5*   PLT SEE COMMENT SEE COMMENT 140*     CMP:   Recent Labs   Lab 11/26/24  0554 11/26/24  1207 11/27/24  0456    142 137   K 4.5 3.7 4.2   * 113* 109   CO2 13* 16* 20*   * 219* 126*   BUN 30* 40* 49*   CREATININE 1.7* 1.9* 2.1*   CALCIUM 8.0* 7.9* 7.3*   ANIONGAP 15 13 8     Cardiac Markers:   No results for input(s): "CKMB", "MYOGLOBIN", "BNP", "TROPISTAT" in the last 48 hours.    Troponin:   Recent Labs   Lab 11/27/24 0456   TROPONINI 0.050*       Significant Imaging: I have reviewed all pertinent imaging results/findings within the past 24 hours.  Imaging Results              X-Ray Chest 1 View Pre-OP (Final result)  Result time 11/25/24 11:24:59      Final result by Johan Seo MD (11/25/24 11:24:59)                   Impression:      No significant intrathoracic abnormality.  No significant detrimental interval change in the appearance of the chest since 03/30/2024 at 09:18 is appreciated.      Electronically signed by: Johan Seo MD  Date:    11/25/2024  Time:    11:24               Narrative:    EXAMINATION:  XR CHEST 1 VIEW PRE-OP    CLINICAL HISTORY:  pre op;    TECHNIQUE:  One view    COMPARISON:  Comparison is made to 03/30/2024 at 21:18.    FINDINGS:  Allowing for magnification of the cardiomediastinal silhouette related to projection, the true heart size is at the upper limit of normal to minimally enlarged, and the cardiomediastinal silhouette has not changed appreciably since the examination referenced above.  Lung zones are stable since the prior exam, with no new areas of airspace consolidation or volume loss evident.  No pleural fluid.  No abnormal mediastinal widening, with tortuosity of the thoracic aorta and calcification in its wall again observed.  A skin fold is superimposed over the right hemithorax, but no pneumothorax is " seen.  Incidental observations again include some scoliosis convex to the right in the upper thoracic region and a minor deformity relating to an old healed fracture of the lateral aspect of the right 6th rib.                                       CT Head Without Contrast (Final result)  Result time 11/25/24 08:40:10      Final result by Kristian Harmon MD (11/25/24 08:40:10)                   Impression:      No acute intracranial hemorrhage/injury.      Electronically signed by: Kristian Harmon  Date:    11/25/2024  Time:    08:40               Narrative:    EXAMINATION:  CT HEAD WITHOUT CONTRAST    CLINICAL HISTORY:  Head trauma, minor (Age >= 65y);    TECHNIQUE:  Low dose axial images were obtained through the head.  Coronal and sagittal reformations were also performed. Contrast was not administered.    COMPARISON:  None.    FINDINGS:  There is no evidence of hydrocephalus, mass effect, intracranial hemorrhage or acute territorial infarct.    Decreased attenuation in the periventricular white matter is nonspecific but may reflect moderate chronic small vessel ischemic change.    No acute calvarial fracture is identified.  There is opacification of the frontal and residual anterior ethmoid air cells with postsurgical changes within the paranasal sinuses.                                       CT Cervical Spine Without Contrast (Final result)  Result time 11/25/24 08:47:01      Final result by Kristian Harmon MD (11/25/24 08:47:01)                   Impression:      No acute cervical fracture.    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      Electronically signed by: Kristian Harmon  Date:    11/25/2024  Time:    08:47               Narrative:    EXAMINATION:  CT CERVICAL SPINE WITHOUT CONTRAST    CLINICAL HISTORY:  Neck trauma (Age >=  65y);    TECHNIQUE:  Low dose axial images, sagittal and coronal reformations were performed though the cervical spine.  Contrast was not administered.    COMPARISON:  None    FINDINGS:  There is no evidence of acute cervical fracture.    The prevertebral soft tissues are unremarkable.    Degenerative changes are identified within the cervical spine.  At C5-6 there is a broad central disc herniation with extrusion and superior migration by in the C5 vertebral body that results in flattening of the traversing cord and encroachment on the exiting right C6 nerve root.    Atherosclerotic vascular calcifications are identified at the carotid bifurcations bilaterally.                                       X-Ray Hips Bilateral 2 View Incl AP Pelvis (Final result)  Result time 11/25/24 08:20:04      Final result by Johan Miles MD (11/25/24 08:20:04)                   Impression:      Acute fracture of the right hip.      Electronically signed by: Johan Miles MD  Date:    11/25/2024  Time:    08:20               Narrative:    EXAMINATION:  XR HIPS BILATERAL 2 VIEW INCL AP PELVIS    CLINICAL HISTORY:  Pain in unspecified hip    TECHNIQUE:  AP view of the pelvis and frogleg lateral views of both hips were performed.    COMPARISON:  None 04/29/2024.    FINDINGS:  There is an  acute  comminuted  inter trochanteric fracture identified involving the right hip.    Postoperative changes of internal fixation of the left hip and femur identified as before.  No bone destruction.

## 2024-11-27 NOTE — ASSESSMENT & PLAN NOTE
- . Trop 0.022  - ECG w/ Sinus rhythm with Premature atrial complexes   - echo ordered and showing hyperdynamic ef consistent with signs of volume down  - tele  Suspect dry on admit given now with biju/nausea post op 11/26

## 2024-11-27 NOTE — PLAN OF CARE
Problem: Hip Fracture Medical Management  Goal: Optimal Coping with Change in Health Status  Outcome: Progressing     Problem: Skin Injury Risk Increased  Goal: Skin Health and Integrity  Outcome: Progressing     Problem: Fall Injury Risk  Goal: Absence of Fall and Fall-Related Injury  Outcome: Progressing     Problem: Adult Inpatient Plan of Care  Goal: Plan of Care Review  Outcome: Progressing

## 2024-11-27 NOTE — SUBJECTIVE & OBJECTIVE
Past Medical History:   Diagnosis Date    Asthma     Cyst of pancreas     liver    Diabetes mellitus type II     Eczema of hand     Glaucoma     History of colon cancer 10/28/2022    Right sided colon cancer diagnosed in setting of LBO requiring urgent hemicolectomy 10/25/2022 with path showing [id number] disease. CT chest 12/7/22 with possible lung metastases and MR liver with indeterminate liver lesions. 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 Patient elected for surveillance.       History of recurrent pneumonia 09/28/2020    Hyperlipidemia     Hypertension     Lichen planus     gums    Osteoporosis     Other chronic pancreatitis 03/01/2023    Pancreas cyst 03/18/2016    Pulmonary nodules     Spinal stenosis of lumbar region 08/30/2018     Past Surgical History:   Procedure Laterality Date    BRONCHOSCOPY N/A 05/13/2022    Procedure: Bronchoscopy;  Surgeon: [facility] Provider;  [facility];  Service: Anesthesiology;  Laterality: N/A;    CATARACT EXTRACTION, BILATERAL      CHOLECYSTECTOMY      COLECTOMY, RIGHT Right 10/25/2022    Procedure: COLECTOMY, RIGHT;  Surgeon: Jass Holman MD;  [facility];  Service: Colon and Rectal;  Laterality: Right;    COLONOSCOPY N/A 06/26/2023    Procedure: COLONOSCOPY;  Surgeon: Jass Holman MD;  Location: [facility] ENDO (2ND FLR);  Service: Endoscopy;  Laterality: N/A;  2nd floor -lung disease  referral Dr [email or web address] portal/mailed-GT  6/20/23- [place] confirmed- [place]    ENDOSCOPIC ULTRASOUND OF UPPER GASTROINTESTINAL TRACT N/A 04/22/2022    Procedure: ULTRASOUND, UPPER GI TRACT, ENDOSCOPIC;  Surgeon: Max Rosado MD;  [facility]  Service: Endoscopy;  Laterality: N/A;  urgent EUS (linear) for abnormal CT scan with dilated PD and increased cyst of pancreas cyst.  pap 44 mmHg  4/13:fully vaccinated. instructions via portal-[facility]     EPIDURAL STEROID INJECTION INTO LUMBAR SPINE N/A 11/08/2018    Procedure: Injection-steroid-epidural-lumbar L5-S1;  Surgeon: Nicci Osorio Jr., MD;  [facility];  Service: Pain Management;  Laterality: N/A;    FUNCTIONAL ENDOSCOPIC SINUS SURGERY (FESS) USING COMPUTER-ASSISTED NAVIGATION N/A 06/17/2019    Procedure: FESS, USING COMPUTER-ASSISTED NAVIGATION;  Surgeon: Rosangela Isabel MD;  Location: [facility] OR;  Service: ENT;  Laterality: N/A;  video    HIP SURGERY Left 04/2024    HYSTERECTOMY      INTRAMEDULLARY RODDING OF FEMUR Left 03/31/2024    Procedure: INSERTION, INTRAMEDULLARY CAYDEN, FEMUR - Left,;  Surgeon: Nakul Walker MD;  Location: [facility] OR [address] [place] [address];  Service: Orthopedics;  Laterality: Left;    INTRAMEDULLARY RODDING OF FEMUR Right 11/25/2024    Procedure: INSERTION, INTRAMEDULLARY CAYDEN, FEMUR;  Surgeon: Lorenzo Blunt MD;  Location: [facility] OR [facility] FLR;  Service: Orthopedics;  Laterality: Right;    nasal polyps       Review of patient's allergies indicates:   Allergen Reactions    Aspirin Other (See Comments)     Asthma    TRIAD OF NASAL POLYPS, ASA  ALLERGY AND ASTHMA.        Aspirin Other (See Comments)    Nsaids (non-steroidal anti-inflammatory drug) Other (See Comments)     AVOID DUE TO TRIAD OF ASA ALLERGY, NASAL POLYPS,AND ASTHMA  AVOID DUE TO TRIAD OF ASA ALLERGY, NASAL POLYPS,AND ASTHMA    Penicillin      Other reaction(s): Rash    9/30/20: tolerates ceftriaxone    Penicillin g Other (See Comments)     Other reaction(s): Rash    9/30/20: tolerates ceftriaxone       Scheduled Medications:    acetaminophen  1,000 mg Oral Q6H    apixaban  2.5 mg Oral BID    atorvastatin  20 mg Oral QHS    insulin glargine U-100  5 Units Subcutaneous Daily    latanoprost  1 drop Both Eyes QHS    lipase-protease-amylase 24,000-76,000-120,000 units  2 capsule Oral TID WM    magnesium oxide  400 mg Oral Daily    montelukast  10 mg Oral QHS    polyethylene glycol  17 g Oral Daily     senna-docusate 8.6-50 mg  1 tablet Oral BID    sodium bicarbonate  1,300 mg Oral TID    vitamin D  1,000 Units Oral Daily       PRN Medications:   Current Facility-Administered Medications:     0.9%  NaCl infusion (for blood administration), , Intravenous, Q24H PRN    artificial tears, 1 drop, Both Eyes, QID PRN    bisacodyL, 10 mg, Rectal, Daily PRN    dextrose 10%, 12.5 g, Intravenous, PRN    dextrose 10%, 25 g, Intravenous, PRN    glucagon (human recombinant), 1 mg, Intramuscular, PRN    glucose, 16 g, Oral, PRN    glucose, 24 g, Oral, PRN    hydrALAZINE, 50 mg, Oral, Q8H PRN    insulin aspart U-100, 0-10 Units, Subcutaneous, QID (AC + HS) PRN    melatonin, 6 mg, Oral, Nightly PRN    methocarbamoL, 500 mg, Oral, Q6H PRN    ondansetron, 4 mg, Intravenous, Q6H PRN    prochlorperazine, 5 mg, Intravenous, Q6H PRN    sodium chloride 0.9%, 10 mL, Intravenous, PRN    Family History       Problem Relation (Age of Onset)    Heart disease Father, Brother    Hypertension Brother          Tobacco Use    Smoking status: Never     Passive exposure: Never    Smokeless tobacco: Never   Substance and Sexual Activity    Alcohol use: Yes     Comment: socially    Drug use: No    Sexual activity: Yes     Partners: Male     Birth control/protection: Post-menopausal     Review of Systems   Constitutional:  Positive for activity change. Negative for fatigue and fever.   HENT:  Negative for sore throat and trouble swallowing.    Eyes:  Negative for visual disturbance.   Respiratory:  Negative for cough and shortness of breath.    Cardiovascular:  Negative for chest pain and leg swelling.   Gastrointestinal:  Negative for abdominal distention and abdominal pain.   Genitourinary:  Negative for difficulty urinating.   Musculoskeletal:  Positive for gait problem. Negative for back pain.   Skin:  Negative for color change.   Neurological:  Positive for weakness. Negative for dizziness, light-headedness and headaches.   Psychiatric/Behavioral:   Negative for agitation and confusion.      Objective:     Vital Signs (Most Recent):  Temp: 97.7 °F (36.5 °C) (11/27/24 0842)  Pulse: 90 (11/27/24 0842)  Resp: 17 (11/27/24 0842)  BP: (!) 128/57 (11/27/24 0842)  SpO2: 95 % (11/27/24 0842)    Vital Signs (24h Range):  Temp:  [97.6 °F (36.4 °C)-98.5 °F (36.9 °C)] 97.7 °F (36.5 °C)  Pulse:  [] 90  Resp:  [16-18] 17  SpO2:  [93 %-100 %] 95 %  BP: ()/(50-60) 128/57     Body mass index is 19.44 kg/m².     Physical Exam  Vitals and nursing note reviewed.   Constitutional:       Appearance: Normal appearance. She is well-developed.   HENT:      Nose: Nose normal.   Eyes:      Pupils: Pupils are equal, round, and reactive to light.   Pulmonary:      Effort: Pulmonary effort is normal. No respiratory distress.   Abdominal:      General: Bowel sounds are normal.      Palpations: Abdomen is soft.   Musculoskeletal:      Cervical back: Normal range of motion and neck supple.      Comments: R arm with dressing in place  RLE weakness present    Skin:     General: Skin is warm and dry.   Neurological:      Mental Status: She is alert and oriented to person, place, and time. Mental status is at baseline.      Motor: Weakness present.      Gait: Gait abnormal.   Psychiatric:         Mood and Affect: Mood normal.         Behavior: Behavior normal.         Thought Content: Thought content normal.         Judgment: Judgment normal.          NEUROLOGICAL EXAMINATION:     MENTAL STATUS   Oriented to person, place, and time.     CRANIAL NERVES     CN III, IV, VI   Pupils are equal, round, and reactive to light.      Diagnostic Results: Labs: Reviewed  ECG: Reviewed  X-Ray: Reviewed

## 2024-11-27 NOTE — ASSESSMENT & PLAN NOTE
Anemia is likely due to chronic disease due to Chronic Kidney Disease. Most recent hemoglobin and hematocrit are listed below.  Recent Labs     11/25/24  2110 11/26/24  0554 11/27/24  0456   HGB 8.1* 8.0* 5.8*   HCT 24.7* 23.7* 18.5*       Plan  - Monitor serial CBC: Daily  - Transfuse PRBC if patient becomes hemodynamically unstable, symptomatic or H/H drops below 7/21.  - Patient has received 1 units of PRBCs on today  - Patient's anemia is currently improving from baseline  Expected downtrend post op from 11- 8 --5.8 and see acute blood loss anemia for this

## 2024-11-27 NOTE — CONSULTS
[facility] - Surgery  Wound Care    Patient Name:  Sussy Costa   MRN:  [id number]  Date: 11/27/2024  Diagnosis: Closed intertrochanteric fracture of right femur    Pt seen for wound consult- right arm skin tears. Pt sitting up in bed, awake, receiving blood. Pt states she fell & hit the edge of a piece of furniture, which tore the skin on her right arm. Pt said sutures were placed in the ED; dressing to arm was dried/stuck on wounds. Did not remove upper arm dressing- will leave in place until f/u visit on Sat. Saturated dressings with wound cleanser and gently removed dried dressings. Sutures intact; pt has 3 separate skin tears, with the most distal being the largest and sutured. All appear clean, no s/s infection. Attempted to approximate loose skin; covered with Urgotul AG for antimicrobial properties and non adherence. Applied thick plain foam as secondary dressing and secured with cast padding. This dressing may stay in place until Sat, when wound care will f/u. Primary nurse at bedside; discussed wound care with nurse and pt. Pt in agreement.     Recs:   Skin tear guidelines- change dressing weekly unless saturated or loose. Wound care will manage dressing changes to right arm skin tear while pt in hospital.     TRAVON Granda, RN, ON  [facility] [place] [facility]  11/27/24 11/27/24 1700   WOCN Assessment   WOCN Total Time (mins) 45   Visit Date 11/27/24   Visit Time 1700   Consult Type New   WOCN Speciality Wound   Wound skin tear   Number of Wounds 1   Intervention assessed;changed;applied;chart review;coordination of care   Teaching on-going        Wound 11/25/24 0652 Skin Tear Right anterior Arm   Date First Assessed/Time First Assessed: 11/25/24 0652   Present on Original Admission: Yes  Primary Wound Type: Skin Tear  Side: Right  Orientation: anterior  Location: Arm   Dressing Appearance Dry;Dried drainage;Intact   Drainage Amount Small   Drainage Characteristics/Odor Sanguineous   Appearance  Pink;Red;Dry;Sutures intact;Steri-strips intact   Tissue loss description Partial thickness   Red (%), Wound Tissue Color 100 %   Periwound Area Ecchymotic;Dry;Other (see comments)  (multiple tears to right arm)   Wound Edges Approximated;Irregular   Care Cleansed with:;Wound cleanser   Dressing Applied;Silver;Silicone;Cast padding;Foam       History:     Past Medical History:   Diagnosis Date    Asthma     Cyst of pancreas     liver    Diabetes mellitus type II     Eczema of hand     Glaucoma     History of colon cancer 10/28/2022    Right sided colon cancer diagnosed in setting of LBO requiring urgent hemicolectomy 10/25/2022 with path showing [id number] disease. CT chest 12/7/22 with possible lung metastases and MR liver with indeterminate liver lesions. 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 Patient elected for surveillance.       History of recurrent pneumonia 09/28/2020    Hyperlipidemia     Hypertension     Lichen planus     gums    Osteoporosis     Other chronic pancreatitis 03/01/2023    Pancreas cyst 03/18/2016    Pulmonary nodules     Spinal stenosis of lumbar region 08/30/2018       Social History     Socioeconomic History    Marital status:      Spouse name: chuck    Number of children: 1   Occupational History    Occupation:    Tobacco Use    Smoking status: Never     Passive exposure: Never    Smokeless tobacco: Never   Substance and Sexual Activity    Alcohol use: Yes     Comment: socially    Drug use: No    Sexual activity: Yes     Partners: Male     Birth control/protection: Post-menopausal   Social History Narrative    Lives in [place] with  Chuck. Daughter passed away in 2017 with leukemia. Two adult grandchildren. Former .     Social Drivers of Health     Financial Resource Strain: Low Risk  (11/25/2024)    Overall Financial Resource Strain (CARDIA)     Difficulty of Paying  Living Expenses: Not hard at all   Food Insecurity: No Food Insecurity (11/25/2024)    Hunger Vital Sign     Worried About Running Out of Food in the Last Year: Never true     Ran Out of Food in the Last Year: Never true   Transportation Needs: No Transportation Needs (11/25/2024)    TRANSPORTATION NEEDS     Transportation : No   Physical Activity: Inactive (11/25/2024)    Exercise Vital Sign     Days of Exercise per Week: 0 days     Minutes of Exercise per Session: 0 min   Stress: No Stress Concern Present (11/25/2024)    [employer] - Occupational Stress Questionnaire     Feeling of Stress : Not at all   Housing Stability: Low Risk  (11/25/2024)    Housing Stability Vital Sign     Unable to Pay for Housing in the Last Year: No     Homeless in the Last Year: No       Precautions:     Allergies as of 11/25/2024 - Reviewed 11/25/2024   Allergen Reaction Noted    Aspirin Other (See Comments) 09/25/2013    Aspirin Other (See Comments) 09/24/2012    Nsaids (non-steroidal anti-inflammatory drug) Other (See Comments) 09/25/2013    Penicillin  08/04/2005    Penicillin g Other (See Comments) 08/04/2005       WO Assessment Details/Treatment   See above

## 2024-11-27 NOTE — SUBJECTIVE & OBJECTIVE
Principal Problem:Closed intertrochanteric fracture of right femur    Principal Orthopedic Problem: same    Interval History: s/p R femur IMN on 11/25/24. Patient seen and examined at bedside. Reports nausea seems somewhat improved, but still having difficulty keeping food down. TTE performed yesterday. Hb 5.8 this am, 1 unit PRBC transfusing on my exam today. Anticipate mobilizing with PT today.         Review of patient's allergies indicates:   Allergen Reactions    Aspirin Other (See Comments)     Asthma    TRIAD OF NASAL POLYPS, ASA  ALLERGY AND ASTHMA.        Aspirin Other (See Comments)    Nsaids (non-steroidal anti-inflammatory drug) Other (See Comments)     AVOID DUE TO TRIAD OF ASA ALLERGY, NASAL POLYPS,AND ASTHMA  AVOID DUE TO TRIAD OF ASA ALLERGY, NASAL POLYPS,AND ASTHMA    Penicillin      Other reaction(s): Rash    9/30/20: tolerates ceftriaxone    Penicillin g Other (See Comments)     Other reaction(s): Rash    9/30/20: tolerates ceftriaxone       Current Facility-Administered Medications   Medication    0.9%  NaCl infusion (for blood administration)    acetaminophen tablet 1,000 mg    apixaban tablet 2.5 mg    artificial tears 0.5 % ophthalmic solution 1 drop    atorvastatin tablet 20 mg    bisacodyL suppository 10 mg    dextrose 10% bolus 125 mL 125 mL    dextrose 10% bolus 250 mL 250 mL    glucagon (human recombinant) injection 1 mg    glucose chewable tablet 16 g    glucose chewable tablet 24 g    hydrALAZINE tablet 50 mg    insulin aspart U-100 pen 0-10 Units    insulin glargine U-100 (Lantus) pen 5 Units    latanoprost 0.005 % ophthalmic solution 1 drop    lipase-protease-amylase 24,000-76,000-120,000 units capsule 2 capsule    magnesium oxide tablet 400 mg    melatonin tablet 6 mg    methocarbamoL tablet 500 mg    montelukast tablet 10 mg    ondansetron injection 4 mg    polyethylene glycol packet 17 g    prochlorperazine injection Soln 5 mg    senna-docusate 8.6-50 mg per tablet 1 tablet     "sodium bicarbonate tablet 1,300 mg    sodium chloride 0.9% flush 10 mL    vitamin D 1000 units tablet 1,000 Units     Objective:     Vital Signs (Most Recent):  Temp: 98 °F (36.7 °C) (11/27/24 0654)  Pulse: 88 (11/27/24 0654)  Resp: 16 (11/27/24 0654)  BP: (!) 104/50 (11/27/24 0654)  SpO2: (!) 93 % (11/27/24 0654) Vital Signs (24h Range):  Temp:  [97.6 °F (36.4 °C)-98.5 °F (36.9 °C)] 98 °F (36.7 °C)  Pulse:  [] 88  Resp:  [16-18] 16  SpO2:  [93 %-100 %] 93 %  BP: ()/(50-58) 104/50     Weight: 53 kg (116 lb 13.5 oz)  Height: 5' 5" (165.1 cm)  Body mass index is 19.44 kg/m².      Intake/Output Summary (Last 24 hours) at 11/27/2024 0708  Last data filed at 11/27/2024 0510  Gross per 24 hour   Intake 1600 ml   Output 750 ml   Net 850 ml        Ortho/SPM Exam  AAOx4  NAD  Reg rate  No increased WOB    RLE:  Dressing c/d/i  SILT T/SP/DP/Stephens/Sa  Motor intact T/SP/DP  WWP extremities  FCDs in place and functioning       Significant Labs: CBC:   Recent Labs   Lab 11/25/24  2110 11/26/24  0554 11/27/24  0456   WBC 10.81 10.81 10.70   HGB 8.1* 8.0* 5.8*   HCT 24.7* 23.7* 18.5*   PLT SEE COMMENT SEE COMMENT 140*     CMP:   Recent Labs   Lab 11/25/24  0722 11/26/24  0554 11/26/24  1207 11/27/24  0456    143 142 137   K 3.3* 4.5 3.7 4.2   * 115* 113* 109   CO2 20* 13* 16* 20*   * 318* 219* 126*   BUN 24* 30* 40* 49*   CREATININE 1.0 1.7* 1.9* 2.1*   CALCIUM 9.5 8.0* 7.9* 7.3*   PROT 6.5  --   --   --    ALBUMIN 3.6  --   --   --    BILITOT 0.5  --   --   --    ALKPHOS 61  --   --   --    AST 29  --   --   --    ALT 29  --   --   --    ANIONGAP 9 15 13 8     All pertinent labs within the past 24 hours have been reviewed.    Significant Imaging: I have reviewed all pertinent imaging results/findings.  "

## 2024-11-27 NOTE — ASSESSMENT & PLAN NOTE
Osteoporosis with current pathological fracture   Senile osteoporosis     -sustained in mechanical fall and associated with known osteoporosis  -hip pathway started on admission with multimodal pain control, bowel regimen  -OR with ortho no 11/25 for IM nail with dr leanna quinonez post op for 35 days, end date 12/25  -PT/OT today and ref to rehab where she went after hip fx earlier this year as had great experience poor family preference  -hg 8 post op--5.8 and 1 U today for anemia post op and repeat cbc after to see if needs further transfusion today  -cont vit D, needs osteoporosis clinic f/u as was already on prolia with new fx now

## 2024-11-27 NOTE — ASSESSMENT & PLAN NOTE
Patient's FSGs are controlled on current medication regimen.  Last A1c reviewed-   Lab Results   Component Value Date    HGBA1C 5.9 (H) 11/25/2024     Most recent fingerstick glucose reviewed-   Recent Labs   Lab 11/26/24  1521 11/26/24  1820 11/26/24  2141 11/27/24  0627   POCTGLUCOSE 129* 82 138* 155*       Current correctional scale  Medium  Maintain anti-hyperglycemic dose as follows-   Antihyperglycemics (From admission, onward)      Start     Stop Route Frequency Ordered    11/27/24 0900  insulin glargine U-100 (Lantus) pen 5 Units         -- SubQ Daily 11/27/24 0654    11/25/24 1057  insulin aspart U-100 pen 0-10 Units         -- SubQ Before meals & nightly PRN 11/25/24 1000          Hold Oral hypoglycemics while patient is in the hospital.  At home was on :  Medication Changes:   Jardiance 10 mg daily  Lantus 5 units in AM and 5 units in the PM   Novolog 3-5-4 units before meals   Add correction scale if needed.   Blood sugar 180 to 230 add 1 units   Blood sugar 231 to 280 add 2 units   Blood sugar greater than 281 add 3 units     -glucose high in 200s on 11/25 PM so 5 U given post op 11/25 and higher in 300s 11/26 so titrated up and rosa novolgo added but nauseated/poor oral intake so being covered with SSI now with improving from 300s-200s now. Vbg with ph 7.35 so not in dka as metabolic acidosis likely from perfusion/biju with elevated lactate likely from this as cause and not from DKA, beta hydroxy a small bit up but also hasn't eaten much at all in a day so has reason for ketones to be slightly up as well and ruled out dka as contributor right now for acidosis with other cause  -titrate further, steroid given intra-op likely contributor to highs as per note from April had similar issue post op then and then has had labile glucoses with lows last admit as well so will watch closely for trends  -on bland diet now for nausea but will titrate back to dm when ready  -glucose improved later 11/26 PM and 11/27  am in lower 100s so dec dosing to 5 U for 11/27 AM and hold insulin with meals as not eating consisntely now, and then watch trends so see if need to add BID dosing back or with meals to reflect home dosing as above.

## 2024-11-28 PROBLEM — R11.0 NAUSEA: Status: RESOLVED | Noted: 2024-11-26 | Resolved: 2024-11-28

## 2024-11-28 PROBLEM — R55 POSTURAL DIZZINESS WITH PRESYNCOPE: Status: RESOLVED | Noted: 2024-11-25 | Resolved: 2024-11-28

## 2024-11-28 PROBLEM — E55.9 VITAMIN D DEFICIENCY: Status: RESOLVED | Noted: 2024-11-27 | Resolved: 2024-11-28

## 2024-11-28 PROBLEM — D64.9 NORMOCYTIC ANEMIA: Status: RESOLVED | Noted: 2024-11-25 | Resolved: 2024-11-28

## 2024-11-28 PROBLEM — R42 POSTURAL DIZZINESS WITH PRESYNCOPE: Status: RESOLVED | Noted: 2024-11-25 | Resolved: 2024-11-28

## 2024-11-28 LAB
ANION GAP SERPL CALC-SCNC: 7 MMOL/L (ref 8–16)
BASOPHILS # BLD AUTO: 0.04 K/UL (ref 0–0.2)
BASOPHILS NFR BLD: 0.3 % (ref 0–1.9)
BUN SERPL-MCNC: 43 MG/DL (ref 8–23)
CALCIUM SERPL-MCNC: 8.2 MG/DL (ref 8.7–10.5)
CHLORIDE SERPL-SCNC: 113 MMOL/L (ref 95–110)
CO2 SERPL-SCNC: 21 MMOL/L (ref 23–29)
CREAT SERPL-MCNC: 1.3 MG/DL (ref 0.5–1.4)
CREAT UR-MCNC: 56 MG/DL (ref 15–325)
DIFFERENTIAL METHOD BLD: ABNORMAL
EOSINOPHIL # BLD AUTO: 0.6 K/UL (ref 0–0.5)
EOSINOPHIL NFR BLD: 4.8 % (ref 0–8)
ERYTHROCYTE [DISTWIDTH] IN BLOOD BY AUTOMATED COUNT: 18 % (ref 11.5–14.5)
EST. GFR  (NO RACE VARIABLE): 39.6 ML/MIN/1.73 M^2
GLUCOSE SERPL-MCNC: 113 MG/DL (ref 70–110)
HCT VFR BLD AUTO: 28.9 % (ref 37–48.5)
HGB BLD-MCNC: 9.5 G/DL (ref 12–16)
IMM GRANULOCYTES # BLD AUTO: 0.08 K/UL (ref 0–0.04)
IMM GRANULOCYTES NFR BLD AUTO: 0.6 % (ref 0–0.5)
LYMPHOCYTES # BLD AUTO: 1.7 K/UL (ref 1–4.8)
LYMPHOCYTES NFR BLD: 13.2 % (ref 18–48)
MAGNESIUM SERPL-MCNC: 1.9 MG/DL (ref 1.6–2.6)
MCH RBC QN AUTO: 30.2 PG (ref 27–31)
MCHC RBC AUTO-ENTMCNC: 32.9 G/DL (ref 32–36)
MCV RBC AUTO: 92 FL (ref 82–98)
MONOCYTES # BLD AUTO: 1 K/UL (ref 0.3–1)
MONOCYTES NFR BLD: 7.9 % (ref 4–15)
NEUTROPHILS # BLD AUTO: 9.1 K/UL (ref 1.8–7.7)
NEUTROPHILS NFR BLD: 73.2 % (ref 38–73)
NRBC BLD-RTO: 0 /100 WBC
PHOSPHATE SERPL-MCNC: 2.3 MG/DL (ref 2.7–4.5)
PLATELET # BLD AUTO: 140 K/UL (ref 150–450)
PMV BLD AUTO: 12.1 FL (ref 9.2–12.9)
POCT GLUCOSE: 131 MG/DL (ref 70–110)
POCT GLUCOSE: 202 MG/DL (ref 70–110)
POCT GLUCOSE: 205 MG/DL (ref 70–110)
POCT GLUCOSE: 216 MG/DL (ref 70–110)
POTASSIUM SERPL-SCNC: 4.6 MMOL/L (ref 3.5–5.1)
RBC # BLD AUTO: 3.15 M/UL (ref 4–5.4)
SODIUM SERPL-SCNC: 141 MMOL/L (ref 136–145)
SODIUM UR-SCNC: 35 MMOL/L (ref 20–250)
UUN UR-MCNC: 861 MG/DL (ref 140–1050)
WBC # BLD AUTO: 12.47 K/UL (ref 3.9–12.7)

## 2024-11-28 PROCEDURE — 82570 ASSAY OF URINE CREATININE: CPT | Performed by: HOSPITALIST

## 2024-11-28 PROCEDURE — 97530 THERAPEUTIC ACTIVITIES: CPT | Mod: CO

## 2024-11-28 PROCEDURE — 25000003 PHARM REV CODE 250: Performed by: HOSPITALIST

## 2024-11-28 PROCEDURE — 36415 COLL VENOUS BLD VENIPUNCTURE: CPT | Performed by: HOSPITALIST

## 2024-11-28 PROCEDURE — 84100 ASSAY OF PHOSPHORUS: CPT | Performed by: HOSPITALIST

## 2024-11-28 PROCEDURE — 63600175 PHARM REV CODE 636 W HCPCS: Performed by: HOSPITALIST

## 2024-11-28 PROCEDURE — 97535 SELF CARE MNGMENT TRAINING: CPT | Mod: CO

## 2024-11-28 PROCEDURE — 85025 COMPLETE CBC W/AUTO DIFF WBC: CPT | Performed by: HOSPITALIST

## 2024-11-28 PROCEDURE — 25000003 PHARM REV CODE 250: Performed by: INTERNAL MEDICINE

## 2024-11-28 PROCEDURE — 84300 ASSAY OF URINE SODIUM: CPT | Performed by: HOSPITALIST

## 2024-11-28 PROCEDURE — 25000003 PHARM REV CODE 250

## 2024-11-28 PROCEDURE — 83735 ASSAY OF MAGNESIUM: CPT | Performed by: HOSPITALIST

## 2024-11-28 PROCEDURE — 97116 GAIT TRAINING THERAPY: CPT

## 2024-11-28 PROCEDURE — 21400001 HC TELEMETRY ROOM

## 2024-11-28 PROCEDURE — 80048 BASIC METABOLIC PNL TOTAL CA: CPT | Performed by: HOSPITALIST

## 2024-11-28 PROCEDURE — 84540 ASSAY OF URINE/UREA-N: CPT | Performed by: HOSPITALIST

## 2024-11-28 RX ORDER — SODIUM,POTASSIUM PHOSPHATES 280-250MG
1 POWDER IN PACKET (EA) ORAL
Status: DISCONTINUED | OUTPATIENT
Start: 2024-11-28 | End: 2024-11-29 | Stop reason: HOSPADM

## 2024-11-28 RX ORDER — OXYCODONE HYDROCHLORIDE 5 MG/1
5 TABLET ORAL EVERY 4 HOURS PRN
Status: DISCONTINUED | OUTPATIENT
Start: 2024-11-28 | End: 2024-11-29 | Stop reason: HOSPADM

## 2024-11-28 RX ORDER — METHOCARBAMOL 500 MG/1
500 TABLET, FILM COATED ORAL 4 TIMES DAILY
Status: DISCONTINUED | OUTPATIENT
Start: 2024-11-28 | End: 2024-11-29 | Stop reason: HOSPADM

## 2024-11-28 RX ADMIN — HYDRALAZINE HYDROCHLORIDE 50 MG: 50 TABLET ORAL at 09:11

## 2024-11-28 RX ADMIN — POTASSIUM & SODIUM PHOSPHATES POWDER PACK 280-160-250 MG 1 PACKET: 280-160-250 PACK at 11:11

## 2024-11-28 RX ADMIN — ACETAMINOPHEN 1000 MG: 500 TABLET ORAL at 09:11

## 2024-11-28 RX ADMIN — INSULIN ASPART 4 UNITS: 100 INJECTION, SOLUTION INTRAVENOUS; SUBCUTANEOUS at 04:11

## 2024-11-28 RX ADMIN — METHOCARBAMOL 500 MG: 500 TABLET ORAL at 02:11

## 2024-11-28 RX ADMIN — SENNOSIDES AND DOCUSATE SODIUM 1 TABLET: 50; 8.6 TABLET ORAL at 09:11

## 2024-11-28 RX ADMIN — OXYCODONE 5 MG: 5 TABLET ORAL at 09:11

## 2024-11-28 RX ADMIN — ACETAMINOPHEN 1000 MG: 500 TABLET ORAL at 02:11

## 2024-11-28 RX ADMIN — POTASSIUM & SODIUM PHOSPHATES POWDER PACK 280-160-250 MG 1 PACKET: 280-160-250 PACK at 04:11

## 2024-11-28 RX ADMIN — ATORVASTATIN CALCIUM 20 MG: 20 TABLET, FILM COATED ORAL at 09:11

## 2024-11-28 RX ADMIN — ACETAMINOPHEN 1000 MG: 500 TABLET ORAL at 06:11

## 2024-11-28 RX ADMIN — POTASSIUM & SODIUM PHOSPHATES POWDER PACK 280-160-250 MG 1 PACKET: 280-160-250 PACK at 09:11

## 2024-11-28 RX ADMIN — METHOCARBAMOL 500 MG: 500 TABLET ORAL at 09:11

## 2024-11-28 RX ADMIN — INSULIN GLARGINE 5 UNITS: 100 INJECTION, SOLUTION SUBCUTANEOUS at 09:11

## 2024-11-28 RX ADMIN — APIXABAN 2.5 MG: 2.5 TABLET, FILM COATED ORAL at 09:11

## 2024-11-28 RX ADMIN — PANCRELIPASE 2 CAPSULE: 120000; 24000; 76000 CAPSULE, DELAYED RELEASE PELLETS ORAL at 11:11

## 2024-11-28 RX ADMIN — METHOCARBAMOL 500 MG: 500 TABLET ORAL at 04:11

## 2024-11-28 RX ADMIN — PANCRELIPASE 2 CAPSULE: 120000; 24000; 76000 CAPSULE, DELAYED RELEASE PELLETS ORAL at 04:11

## 2024-11-28 RX ADMIN — INSULIN ASPART 4 UNITS: 100 INJECTION, SOLUTION INTRAVENOUS; SUBCUTANEOUS at 11:11

## 2024-11-28 RX ADMIN — LATANOPROST 1 DROP: 50 SOLUTION OPHTHALMIC at 09:11

## 2024-11-28 RX ADMIN — PANCRELIPASE 2 CAPSULE: 120000; 24000; 76000 CAPSULE, DELAYED RELEASE PELLETS ORAL at 07:11

## 2024-11-28 RX ADMIN — CHOLECALCIFEROL TAB 25 MCG (1000 UNIT) 1000 UNITS: 25 TAB at 09:11

## 2024-11-28 RX ADMIN — MONTELUKAST 10 MG: 10 TABLET, FILM COATED ORAL at 09:11

## 2024-11-28 RX ADMIN — ONDANSETRON 4 MG: 2 INJECTION INTRAMUSCULAR; INTRAVENOUS at 02:11

## 2024-11-28 RX ADMIN — SODIUM BICARBONATE 1300 MG: 650 TABLET ORAL at 09:11

## 2024-11-28 RX ADMIN — SODIUM BICARBONATE 1300 MG: 650 TABLET ORAL at 02:11

## 2024-11-28 RX ADMIN — Medication 400 MG: at 09:11

## 2024-11-28 NOTE — PT/OT/SLP PROGRESS
"Physical Therapy Treatment    Patient Name:  Sussy Costa   MRN:  [id number]    Recommendations:     Discharge Recommendations: High Intensity Therapy  Discharge Equipment Recommendations: none  Barriers to discharge: None    Assessment:     Sussy Costa is a 89 y.o. female admitted with a medical diagnosis of Closed displaced intertrochanteric fracture of right femur.  She presents with the following impairments/functional limitations: weakness, impaired endurance, impaired self care skills, impaired functional mobility, impaired balance, gait instability, decreased lower extremity function, pain, orthopedic precautions     Pt agreeable and tolerated PT treatment fairly. Pt still having difficulty advancing RLE needing moderate assistance of 2 persons with RW to take steps this session and transfer from the chair back to bed. Patient has demonstrated sufficient progression to warrant high intensity therapy evidenced by objectives noted below. .    Rehab Prognosis: Good; patient would benefit from acute skilled PT services to address these deficits and reach maximum level of function.    Recent Surgery: Procedure(s) (LRB):  INSERTION, INTRAMEDULLARY CAYDEN, FEMUR (Right) 3 Days Post-Op    Plan:     During this hospitalization, patient to be seen  (Hip pathway 11/27, 11/28, 11/29, then 4x/week after pathway complete.) to address the identified rehab impairments via gait training, therapeutic activities, therapeutic exercises, neuromuscular re-education and progress toward the following goals:    Plan of Care Expires:  12/27/24    Subjective     Chief Complaint: R hip pain  Patient/Family Comments/goals: "I can't  this leg"  Pain/Comfort:  Pain Rating 1: 0/10 (at rest)  Location - Side 1: Right  Location - Orientation 1: generalized  Location 1: hip  Pain Addressed 1: Reposition, Distraction, Cessation of Activity  Pain Rating Post-Intervention 1: 3/10      Objective:     Additional Staff Present: Oanh" Tech    Communicated with RN prior to session.  Patient found up in chair with FCD, blood pressure cuff, PureWick upon PT entry to room.     General Precautions: Standard, fall  Orthopedic Precautions: RLE weight bearing as tolerated  Braces: N/A  Respiratory Status: Room air     Functional Mobility:    Bed Mobility:   Sit > Supine: maximal assistance and of 2 persons    Transfers:   Sit <> Stand Transfer: moderate assistance from bedside chair using rolling walker   Bed <> Chair: moderate assistance and of 2 persons using rolling walker     Balance:   Sitting balance: FAIR+: Maintains balance through MINIMAL excursions of active trunk motion  Standing balance:   POOR+: Needs MINIMAL assist to maintain  POOR: Needs MOD (moderate) assist during gait                 Gait:  Distance: 4 steps forward + 4 steps to bed   Assistive Device: RW  Assistance Level: moderate assistance and of 2 persons  Gait Assessment: Pt took small steps with decreased stevie foot clearance and narrow MOLLY. Pt needed verbal cues for step sequence and assistance from PT behind R heel to advance RLE when stepping. Manual assistance for RW management      AM-PAC 6 CLICK MOBILITY  Turning over in bed (including adjusting bedclothes, sheets and blankets)?: 3  Sitting down on and standing up from a chair with arms (e.g., wheelchair, bedside commode, etc.): 2  Moving from lying on back to sitting on the side of the bed?: 2  Moving to and from a bed to a chair (including a wheelchair)?: 2  Need to walk in hospital room?: 1  Climbing 3-5 steps with a railing?: 1  Basic Mobility Total Score: 11       Treatment & Education:  Pt educated on tip to reduce fall risk and safety with mobility and using call button for assistance from nursing staff with OOB mobility.  Pt educated on sitting up in chair throughout most of day  All questions answered within the scope of PT.  White board updated accordingly.    Patient left HOB elevated with all lines intact and  call button in reach..    GOALS:   Multidisciplinary Problems       Physical Therapy Goals          Problem: Physical Therapy    Goal Priority Disciplines Outcome Interventions   Physical Therapy Goal     PT, PT/OT Progressing    Description: Goals to be met by: 24     Patient will increase functional independence with mobility by performin. Supine to sit with Contact Guard Assistance  2. Sit to stand transfer with Contact Guard Assistance  3. Bed to chair transfer with Contact Guard Assistance using Rolling Walker  4. Gait  x 100 feet with Minimal Assistance using Rolling Walker.   5. Lower extremity exercise program x30 reps per handout, with independence                         Time Tracking:     PT Received On: 24  PT Start Time: 1307     PT Stop Time: 1325  PT Total Time (min): 18 min     Billable Minutes: Gait Training 18    Treatment Type: Treatment  PT/PTA: PT           2024

## 2024-11-28 NOTE — SUBJECTIVE & OBJECTIVE
Principal Problem:Closed displaced intertrochanteric fracture of right femur    Principal Orthopedic Problem: same    Interval History: s/p R femur IMN on 11/25/24. Patient seen and examined at bedside. Improved oral intake yesterday. Transfused 2u PRBC yesterday, am Hb is 9.5. Took 2 shuffle side steps with PT yesterday/        Review of patient's allergies indicates:   Allergen Reactions    Aspirin Other (See Comments)     Asthma    TRIAD OF NASAL POLYPS, ASA  ALLERGY AND ASTHMA.        Aspirin Other (See Comments)    Nsaids (non-steroidal anti-inflammatory drug) Other (See Comments)     AVOID DUE TO TRIAD OF ASA ALLERGY, NASAL POLYPS,AND ASTHMA  AVOID DUE TO TRIAD OF ASA ALLERGY, NASAL POLYPS,AND ASTHMA    Penicillin      Other reaction(s): Rash    9/30/20: tolerates ceftriaxone    Penicillin g Other (See Comments)     Other reaction(s): Rash    9/30/20: tolerates ceftriaxone       Current Facility-Administered Medications   Medication    0.9%  NaCl infusion (for blood administration)    0.9%  NaCl infusion (for blood administration)    acetaminophen tablet 1,000 mg    apixaban tablet 2.5 mg    artificial tears 0.5 % ophthalmic solution 1 drop    atorvastatin tablet 20 mg    bisacodyL suppository 10 mg    dextrose 10% bolus 125 mL 125 mL    dextrose 10% bolus 250 mL 250 mL    glucagon (human recombinant) injection 1 mg    glucose chewable tablet 16 g    glucose chewable tablet 24 g    hydrALAZINE tablet 50 mg    insulin aspart U-100 pen 0-10 Units    insulin glargine U-100 (Lantus) pen 5 Units    latanoprost 0.005 % ophthalmic solution 1 drop    lipase-protease-amylase 24,000-76,000-120,000 units capsule 2 capsule    magnesium oxide tablet 400 mg    melatonin tablet 6 mg    methocarbamoL tablet 500 mg    montelukast tablet 10 mg    ondansetron injection 4 mg    polyethylene glycol packet 17 g    prochlorperazine injection Soln 5 mg    senna-docusate 8.6-50 mg per tablet 1 tablet    sodium bicarbonate tablet 1,300  "mg    sodium chloride 0.9% flush 10 mL    vitamin D 1000 units tablet 1,000 Units     Objective:     Vital Signs (Most Recent):  Temp: 98.4 °F (36.9 °C) (11/28/24 0531)  Pulse: 94 (11/28/24 0531)  Resp: 16 (11/28/24 0531)  BP: (!) 156/67 (11/28/24 0531)  SpO2: (!) 94 % (11/28/24 0531) Vital Signs (24h Range):  Temp:  [97.3 °F (36.3 °C)-99.1 °F (37.3 °C)] 98.4 °F (36.9 °C)  Pulse:  [81-95] 94  Resp:  [15-18] 16  SpO2:  [93 %-97 %] 94 %  BP: (128-175)/(57-72) 156/67     Weight: 53 kg (116 lb 13.5 oz)  Height: 5' 5" (165.1 cm)  Body mass index is 19.44 kg/m².      Intake/Output Summary (Last 24 hours) at 11/28/2024 0806  Last data filed at 11/28/2024 0713  Gross per 24 hour   Intake 461.67 ml   Output 1100 ml   Net -638.33 ml        Ortho/SPM Exam  AAOx4  NAD  Reg rate  No increased WOB    RLE:  Dressing c/d/i  SILT T/SP/DP/Stephens/Sa  Motor intact T/SP/DP  WWP extremities  FCDs in place and functioning       Significant Labs: CBC:   Recent Labs   Lab 11/27/24 0456 11/27/24  1148 11/28/24 0451   WBC 10.70 13.36* 12.47   HGB 5.8* 7.3* 9.5*   HCT 18.5* 22.9* 28.9*   * 134* 140*     CMP:   Recent Labs   Lab 11/27/24  0456 11/27/24  1148 11/28/24 0451    137 141   K 4.2 4.1 4.6    109 113*   CO2 20* 20* 21*   * 165* 113*   BUN 49* 48* 43*   CREATININE 2.1* 1.8* 1.3   CALCIUM 7.3* 7.5* 8.2*   ANIONGAP 8 8 7*     All pertinent labs within the past 24 hours have been reviewed.    Significant Imaging: I have reviewed all pertinent imaging results/findings.  "

## 2024-11-28 NOTE — PROGRESS NOTES
[facility] - Surgery  Orthopedics  Progress Note    Patient Name: Sussy Costa  MRN: [id number]  Admission Date: 11/25/2024  Hospital Length of Stay: 3 days  Attending Provider: Peace Redd MD  Primary Care Provider: Leon Ramírez MD  Follow-up For: Procedure(s) (LRB):  INSERTION, INTRAMEDULLARY CAYDEN, FEMUR (Right)    Post-Operative Day: 3 Days Post-Op  Subjective:     Principal Problem:Closed displaced intertrochanteric fracture of right femur    Principal Orthopedic Problem: same    Interval History: s/p R femur IMN on 11/25/24. Patient seen and examined at bedside. Improved oral intake yesterday. Transfused 2u PRBC yesterday, am Hb is 9.5. Took 2 shuffle side steps with PT yesterday/        Review of patient's allergies indicates:   Allergen Reactions    Aspirin Other (See Comments)     Asthma    TRIAD OF NASAL POLYPS, ASA  ALLERGY AND ASTHMA.        Aspirin Other (See Comments)    Nsaids (non-steroidal anti-inflammatory drug) Other (See Comments)     AVOID DUE TO TRIAD OF ASA ALLERGY, NASAL POLYPS,AND ASTHMA  AVOID DUE TO TRIAD OF ASA ALLERGY, NASAL POLYPS,AND ASTHMA    Penicillin      Other reaction(s): Rash    9/30/20: tolerates ceftriaxone    Penicillin g Other (See Comments)     Other reaction(s): Rash    9/30/20: tolerates ceftriaxone       Current Facility-Administered Medications   Medication    0.9%  NaCl infusion (for blood administration)    0.9%  NaCl infusion (for blood administration)    acetaminophen tablet 1,000 mg    apixaban tablet 2.5 mg    artificial tears 0.5 % ophthalmic solution 1 drop    atorvastatin tablet 20 mg    bisacodyL suppository 10 mg    dextrose 10% bolus 125 mL 125 mL    dextrose 10% bolus 250 mL 250 mL    glucagon (human recombinant) injection 1 mg    glucose chewable tablet 16 g    glucose chewable tablet 24 g    hydrALAZINE tablet 50 mg    insulin aspart U-100 pen 0-10 Units    insulin glargine U-100 (Lantus) pen 5 Units    latanoprost 0.005 % ophthalmic solution 1 drop     "lipase-protease-amylase 24,000-76,000-120,000 units capsule 2 capsule    magnesium oxide tablet 400 mg    melatonin tablet 6 mg    methocarbamoL tablet 500 mg    montelukast tablet 10 mg    ondansetron injection 4 mg    polyethylene glycol packet 17 g    prochlorperazine injection Soln 5 mg    senna-docusate 8.6-50 mg per tablet 1 tablet    sodium bicarbonate tablet 1,300 mg    sodium chloride 0.9% flush 10 mL    vitamin D 1000 units tablet 1,000 Units     Objective:     Vital Signs (Most Recent):  Temp: 98.4 °F (36.9 °C) (11/28/24 0531)  Pulse: 94 (11/28/24 0531)  Resp: 16 (11/28/24 0531)  BP: (!) 156/67 (11/28/24 0531)  SpO2: (!) 94 % (11/28/24 0531) Vital Signs (24h Range):  Temp:  [97.3 °F (36.3 °C)-99.1 °F (37.3 °C)] 98.4 °F (36.9 °C)  Pulse:  [81-95] 94  Resp:  [15-18] 16  SpO2:  [93 %-97 %] 94 %  BP: (128-175)/(57-72) 156/67     Weight: 53 kg (116 lb 13.5 oz)  Height: 5' 5" (165.1 cm)  Body mass index is 19.44 kg/m².      Intake/Output Summary (Last 24 hours) at 11/28/2024 0806  Last data filed at 11/28/2024 0713  Gross per 24 hour   Intake 461.67 ml   Output 1100 ml   Net -638.33 ml        Ortho/SPM Exam  AAOx4  NAD  Reg rate  No increased WOB    RLE:  Dressing c/d/i  SILT T/SP/DP/Stephens/Sa  Motor intact T/SP/DP  WWP extremities  FCDs in place and functioning       Significant Labs: CBC:   Recent Labs   Lab 11/27/24  0456 11/27/24  1148 11/28/24  0451   WBC 10.70 13.36* 12.47   HGB 5.8* 7.3* 9.5*   HCT 18.5* 22.9* 28.9*   * 134* 140*     CMP:   Recent Labs   Lab 11/27/24 0456 11/27/24  1148 11/28/24  0451    137 141   K 4.2 4.1 4.6    109 113*   CO2 20* 20* 21*   * 165* 113*   BUN 49* 48* 43*   CREATININE 2.1* 1.8* 1.3   CALCIUM 7.3* 7.5* 8.2*   ANIONGAP 8 8 7*     All pertinent labs within the past 24 hours have been reviewed.    Significant Imaging: I have reviewed all pertinent imaging results/findings.  Assessment/Plan:     * Closed displaced intertrochanteric fracture of right " femur s/p IM nail on 11/25/2024  Sussy Costa is a 89 y.o. female with right IT femur fracture, closed, NVI. They take no anticoagulation at home. They required a cane prior to this injury.     Pain control: multimodal  PT/OT: WBAT RLE  DVT PPx: Eliquis 2.5 mg BID, SCDs at all times when not ambulating  Abx: postop Ancef  Labs: above and reviewed  Drain: none  Guevara: removed    Dispo: likely dc to SNF pending medical improvement            Denny Tran MD  Orthopedics  [facility] - Surgery

## 2024-11-28 NOTE — PT/OT/SLP PROGRESS
Occupational Therapy   Treatment  A client care conference was completed by the OTR and the RAMIREZ prior to treatment by the OTR to discuss the patient's POC and current status.      Name: Sussy Costa  MRN: [id number]  Admitting Diagnosis:  Closed displaced intertrochanteric fracture of right femur  3 Days Post-Op    Recommendations:     Discharge Recommendations: High Intensity Therapy  Discharge Equipment Recommendations:  none  Barriers to discharge:  None    Assessment:     Sussy Costa is a 89 y.o. female with a medical diagnosis of Closed displaced intertrochanteric fracture of right femur.  She presents with the following performance deficits affecting function are weakness, gait instability, decreased ROM, decreased lower extremity function, impaired balance, impaired endurance, decreased safety awareness, pain, impaired self care skills, impaired functional mobility, decreased coordination, impaired skin, orthopedic precautions. Pt very pleasant and motivated to participate in session this date. R LE pain is a limiting factor although she was able to complete bed mobility, bed > chair transfer with AD, toileting with assistance, and seated grooming tasks this date. She does require significant assistance for completion of tasks this date due to s/p IM nail R femur 11/25/2024 (3 days post-op) and current performance deficits.     Rehab Prognosis:  Good; patient would benefit from acute skilled OT services to address these deficits and reach maximum level of function.       Plan:     Patient to be seen 4 x/week to address the above listed problems via self-care/home management, therapeutic activities, therapeutic exercises  Plan of Care Expires: 12/27/24  Plan of Care Reviewed with: patient    Subjective     Chief Complaint: Unable to  R LE  Patient/Family Comments/goals: Pt agreeable to tx session. Pt reports she needs to use the bathroom.  Pain/Comfort:  Pain Rating 1: other (see comments) (not  quantified by pt)  Location - Side 1: Right  Location - Orientation 1: generalized  Location 1: hip  Pain Addressed 1: Pre-medicate for activity, Reposition, Distraction, Cessation of Activity    Objective:   Additional Staff Present: Sukhdeep Escalante    Communicated with: nurse, Ying, prior to session.  Patient found supine HOB elevated ~30* degrees with FCD, PureWick, blood pressure cuff, telemetry, Other (comments) (dressing on R forearm) upon OT entry to room.    General Precautions: Standard, fall    Orthopedic Precautions:RLE weight bearing as tolerated  Braces: N/A  Respiratory Status: Room air     Occupational Performance:     Bed Mobility:    Patient completed Scooting  with minimum assistance towards EOB using B UE and bed rail  Patient completed Supine to Sit with Mod A for R LE OOB, held therapist hand for UE support and increased time for completion; utilized bed rail and HOB elevated     Functional Mobility/Transfers:  Patient completed Sit <> Stand Transfer from EOB with moderate assistance and of 2 persons  with  rolling walker   Patient completed Bed <> Chair Transfer using Step Transfer technique with moderate assistance and of 2 persons with rolling walker  Functional Mobility: Pt completed pivot-like steps with B LE with RW Mod A x 2 persons for RW management and balance to improve static/dynamic balance while improving WBAT R LE. Pt unable to clear R foot from floor. Mod verbal cues provided by writing therapist for technique and RW management to ensure pt's safety.  Stand >sit with RW on bedpan with Mod A x 2  Required Max A x 2 to scoot posteriorly on bed pan while pt have a bowel movement  Pt performed static sitting EOB ~8-10 minutes with SBA  Observed pt slightly leaning towards L side, then able to reposition to midline upright while performing grooming and toileting tasks.    Activities of Daily Living:  Grooming: minimum assistance only to remove seal from toothpaste, then  improved to SBA as pt completed brushing teeth, rinse with mouthwash, and washed face seated EOB with bedside table.  Toileting: total assistance as purewick intact; during standing trial pt reports she was having a bowel movement; assisted pt to bedpan with RW x 2 persons; Pt had a bowel movement on bed pain seated EOB with SBA, then required total A x 2 persons for fang-hygiene, gown management, and balance in stance with RW ; writing therapist doffed soiled purewick and replaced with new purewick   Upper body Dressing: Mod A to thread sleeve through UE and drape around back seated EOB        [facility] 6 Click ADL: 15    Treatment & Education:  Pt performed bed mobility, functional mobility/transfers, and ADLs as documented above.   Pt educated and instructed on the following:  OT POC  Role of RAMIREZ  Use of call bell for all assistance  No OOB mobility without staff assistance  Encouraged pt to sit up in chair for ~1-2 hours as tolerable to improve sitting tolerance and OOB tolerance  Addressed questions and /or concerns within RAMIREZ scope of practice  Pt verbalized understanding     Patient left up in chair with all lines intact, call button in reach, chair alarm on, nurse notified, and pt appears in NAD. FCD donned on L foot and B LE elevated on pillow for comfort.    GOALS:   Multidisciplinary Problems       Occupational Therapy Goals          Problem: Occupational Therapy    Goal Priority Disciplines Outcome Interventions   Occupational Therapy Goal     OT, PT/OT Progressing    Description: Goals to be met by: 12/27/24     Patient will increase functional independence with ADLs by performing:    UE Dressing with Stand-by Assistance.  LE Dressing with Minimal Assistance.  Grooming while standing at sink with Stand-by Assistance.  Toileting from toilet with Stand-by Assistance for hygiene and clothing management.   All functional transfers performed with SBA                         Time Tracking:     OT Date of  Treatment: 11/28/24  OT Start Time: 0825  OT Stop Time: 0855  OT Total Time (min): 30 min    Billable Minutes:Self Care/Home Management 15  Therapeutic Activity 15    OT/FINA: FINA     Number of FINA visits since last OT visit: 1    11/28/2024

## 2024-11-28 NOTE — HOSPITAL COURSE
Patient admitted to [facility] Medicine Team H: Hip Fracture team and started on Hip Fracture Pathway with Orthopedic surgery consult for closed displaced right intertrochanteric femur fracture. Patient was seen and evaluated by Orthopedic surgery who recommended operative repair of hip fracture. Patient was medically optimized prior to surgery and was taken to OR after optimization on 11/25/2024. Patient underwent right hip cephalomedullary nail fixation by Dr. Lorenzo Blunt. Post-op patient weight bear as tolerated to the right lower extremity as per Orthopedics recommendation. Patient placed on Apixiban 2.5 mg po BID and DEANNA/SCD's for DVT prophylaxis post-op and will need for total of 30 days. Perineural pain catheter placed by Anesthesia Pain Service with continuous infusion of Ropivacaine to help with pain control post-op and Anesthesia Pain Service managing while patient in the hospital. Patient placed on multimodal pain management post-op with Tylenol 1000 mg po every 8 hours and Robaxin 500 mg po 4 times daily prn for breakthrough pain post-op and will continue. PT/OT consulted post-op and evaluated patient. Patient progressing well with PT and OT and recommended  high intensity  when medically ready for hospital discharge. Rehab referral sent and patient accepted to [facility] when medically ready. Patient post-op noted ot have decrease in Hgb down to 7.3 on 11/27 and worsening Creatinine consistent with ADARSH. Creatinine 1.0 on admit but increased to 1.8 on 11/26 and 1.9 on 11/27. Home Losartan held due to ADARSH. ADARSH felt related to volume depletion and with worsening anemia felt would benefit from blood transfusion so transfused 2 units of PRBCs on 11/27. Patient with no clinical sign of active bleeding. Worsening anemia felt related to expected blood loss from hip fracture and surgery. Creatinine improved to 1.3 on 11/28 and Hgb improved to 9.5 on 11/28 after transfused 2 units of PRBCs on  11/27. Platelets stable at 140,000 on 11/28. Platelet count back to normal at 151,000 on 11/29. Hgb stable at 9.1 on 11/29. Creatinine normal at 0.9 on 11/29. ADARSH resolved so patient okay to resume her home Losartan for her HTN on discharge to [facility]. Patient reports pain well controlled on day of discharge on 11/29. Patient medically ready for discharge and discharged in good condition to [facility] on 11/29. Perineural catheter removed prior to discharge and patient discharged on multimodal pain meds. Surgical bandages to remain in place to right hip and thigh area until Orthopedic clinic follow-up.

## 2024-11-28 NOTE — ASSESSMENT & PLAN NOTE
Sussy Costa is a 89 y.o. female with right IT femur fracture, closed, NVI. They take no anticoagulation at home. They required a cane prior to this injury.     Pain control: multimodal  PT/OT: WBAT RLE  DVT PPx: Eliquis 2.5 mg BID, SCDs at all times when not ambulating  Abx: postop Ancef  Labs: above and reviewed  Drain: none  Guevara: removed    Dispo: likely dc to SNF pending medical improvement

## 2024-11-29 VITALS
DIASTOLIC BLOOD PRESSURE: 67 MMHG | OXYGEN SATURATION: 96 % | BODY MASS INDEX: 19.47 KG/M2 | HEIGHT: 65 IN | RESPIRATION RATE: 18 BRPM | HEART RATE: 92 BPM | SYSTOLIC BLOOD PRESSURE: 140 MMHG | TEMPERATURE: 98 F | WEIGHT: 116.88 LBS

## 2024-11-29 PROBLEM — R07.82 INTERCOSTAL PAIN: Status: RESOLVED | Noted: 2024-11-27 | Resolved: 2024-11-29

## 2024-11-29 PROBLEM — E83.42 HYPOMAGNESEMIA: Status: RESOLVED | Noted: 2024-03-31 | Resolved: 2024-11-29

## 2024-11-29 PROBLEM — D69.6 THROMBOCYTOPENIA: Chronic | Status: RESOLVED | Noted: 2024-04-01 | Resolved: 2024-11-29

## 2024-11-29 PROBLEM — N17.9 AKI (ACUTE KIDNEY INJURY): Status: RESOLVED | Noted: 2024-03-31 | Resolved: 2024-11-29

## 2024-11-29 PROBLEM — E83.39 HYPOPHOSPHATEMIA: Status: RESOLVED | Noted: 2022-10-27 | Resolved: 2024-11-29

## 2024-11-29 PROBLEM — M25.551 RIGHT HIP PAIN: Status: RESOLVED | Noted: 2022-11-02 | Resolved: 2024-11-29

## 2024-11-29 PROBLEM — K59.00 CONSTIPATION: Status: RESOLVED | Noted: 2024-03-31 | Resolved: 2024-11-29

## 2024-11-29 LAB
ANION GAP SERPL CALC-SCNC: 5 MMOL/L (ref 8–16)
BASOPHILS # BLD AUTO: 0.03 K/UL (ref 0–0.2)
BASOPHILS NFR BLD: 0.3 % (ref 0–1.9)
BUN SERPL-MCNC: 28 MG/DL (ref 8–23)
CALCIUM SERPL-MCNC: 7.7 MG/DL (ref 8.7–10.5)
CHLORIDE SERPL-SCNC: 108 MMOL/L (ref 95–110)
CO2 SERPL-SCNC: 25 MMOL/L (ref 23–29)
CREAT SERPL-MCNC: 0.9 MG/DL (ref 0.5–1.4)
DIFFERENTIAL METHOD BLD: ABNORMAL
EOSINOPHIL # BLD AUTO: 0.8 K/UL (ref 0–0.5)
EOSINOPHIL NFR BLD: 7.8 % (ref 0–8)
ERYTHROCYTE [DISTWIDTH] IN BLOOD BY AUTOMATED COUNT: 17.5 % (ref 11.5–14.5)
EST. GFR  (NO RACE VARIABLE): >60 ML/MIN/1.73 M^2
GLUCOSE SERPL-MCNC: 145 MG/DL (ref 70–110)
HCT VFR BLD AUTO: 28.5 % (ref 37–48.5)
HGB BLD-MCNC: 9.1 G/DL (ref 12–16)
IMM GRANULOCYTES # BLD AUTO: 0.07 K/UL (ref 0–0.04)
IMM GRANULOCYTES NFR BLD AUTO: 0.7 % (ref 0–0.5)
LYMPHOCYTES # BLD AUTO: 1.2 K/UL (ref 1–4.8)
LYMPHOCYTES NFR BLD: 11.6 % (ref 18–48)
MAGNESIUM SERPL-MCNC: 1.8 MG/DL (ref 1.6–2.6)
MCH RBC QN AUTO: 29.7 PG (ref 27–31)
MCHC RBC AUTO-ENTMCNC: 31.9 G/DL (ref 32–36)
MCV RBC AUTO: 93 FL (ref 82–98)
MONOCYTES # BLD AUTO: 1 K/UL (ref 0.3–1)
MONOCYTES NFR BLD: 9.2 % (ref 4–15)
NEUTROPHILS # BLD AUTO: 7.4 K/UL (ref 1.8–7.7)
NEUTROPHILS NFR BLD: 70.4 % (ref 38–73)
NRBC BLD-RTO: 0 /100 WBC
PHOSPHATE SERPL-MCNC: 2 MG/DL (ref 2.7–4.5)
PLATELET # BLD AUTO: 151 K/UL (ref 150–450)
PMV BLD AUTO: 12.4 FL (ref 9.2–12.9)
POCT GLUCOSE: 154 MG/DL (ref 70–110)
POTASSIUM SERPL-SCNC: 4.1 MMOL/L (ref 3.5–5.1)
RBC # BLD AUTO: 3.06 M/UL (ref 4–5.4)
SODIUM SERPL-SCNC: 138 MMOL/L (ref 136–145)
WBC # BLD AUTO: 10.45 K/UL (ref 3.9–12.7)

## 2024-11-29 PROCEDURE — 36415 COLL VENOUS BLD VENIPUNCTURE: CPT | Performed by: HOSPITALIST

## 2024-11-29 PROCEDURE — 84100 ASSAY OF PHOSPHORUS: CPT | Performed by: HOSPITALIST

## 2024-11-29 PROCEDURE — 85025 COMPLETE CBC W/AUTO DIFF WBC: CPT | Performed by: HOSPITALIST

## 2024-11-29 PROCEDURE — 99232 SBSQ HOSP IP/OBS MODERATE 35: CPT | Mod: ,,, | Performed by: NURSE PRACTITIONER

## 2024-11-29 PROCEDURE — 25000003 PHARM REV CODE 250: Performed by: HOSPITALIST

## 2024-11-29 PROCEDURE — 80048 BASIC METABOLIC PNL TOTAL CA: CPT | Performed by: HOSPITALIST

## 2024-11-29 PROCEDURE — 99222 1ST HOSP IP/OBS MODERATE 55: CPT | Mod: ,,, | Performed by: PHYSICAL MEDICINE & REHABILITATION

## 2024-11-29 PROCEDURE — 25000003 PHARM REV CODE 250: Performed by: INTERNAL MEDICINE

## 2024-11-29 PROCEDURE — 25000003 PHARM REV CODE 250

## 2024-11-29 PROCEDURE — 83735 ASSAY OF MAGNESIUM: CPT | Performed by: HOSPITALIST

## 2024-11-29 RX ORDER — INSULIN ASPART 100 [IU]/ML
0-10 INJECTION, SOLUTION INTRAVENOUS; SUBCUTANEOUS
Start: 2024-11-29 | End: 2025-11-29

## 2024-11-29 RX ORDER — POLYETHYLENE GLYCOL 3350 17 G/17G
17 POWDER, FOR SOLUTION ORAL DAILY
Start: 2024-11-30

## 2024-11-29 RX ORDER — SODIUM,POTASSIUM PHOSPHATES 280-250MG
1 POWDER IN PACKET (EA) ORAL
Start: 2024-11-29 | End: 2024-12-01

## 2024-11-29 RX ORDER — METHOCARBAMOL 500 MG/1
500 TABLET, FILM COATED ORAL 4 TIMES DAILY
Qty: 40 TABLET | Refills: 0 | Status: SHIPPED | OUTPATIENT
Start: 2024-11-29 | End: 2024-12-09

## 2024-11-29 RX ORDER — SODIUM BICARBONATE 650 MG/1
650 TABLET ORAL 3 TIMES DAILY
Start: 2024-11-29 | End: 2024-12-06

## 2024-11-29 RX ORDER — TALC
6 POWDER (GRAM) TOPICAL NIGHTLY PRN
Start: 2024-11-29

## 2024-11-29 RX ORDER — INSULIN GLARGINE 100 [IU]/ML
INJECTION, SOLUTION SUBCUTANEOUS
Start: 2024-11-29

## 2024-11-29 RX ORDER — OXYCODONE HYDROCHLORIDE 5 MG/1
5 TABLET ORAL EVERY 4 HOURS PRN
Start: 2024-11-29

## 2024-11-29 RX ADMIN — SODIUM BICARBONATE 1300 MG: 650 TABLET ORAL at 08:11

## 2024-11-29 RX ADMIN — INSULIN ASPART 2 UNITS: 100 INJECTION, SOLUTION INTRAVENOUS; SUBCUTANEOUS at 07:11

## 2024-11-29 RX ADMIN — SENNOSIDES AND DOCUSATE SODIUM 1 TABLET: 50; 8.6 TABLET ORAL at 08:11

## 2024-11-29 RX ADMIN — POTASSIUM & SODIUM PHOSPHATES POWDER PACK 280-160-250 MG 1 PACKET: 280-160-250 PACK at 11:11

## 2024-11-29 RX ADMIN — INSULIN GLARGINE 5 UNITS: 100 INJECTION, SOLUTION SUBCUTANEOUS at 08:11

## 2024-11-29 RX ADMIN — CHOLECALCIFEROL TAB 25 MCG (1000 UNIT) 1000 UNITS: 25 TAB at 08:11

## 2024-11-29 RX ADMIN — PANCRELIPASE 2 CAPSULE: 120000; 24000; 76000 CAPSULE, DELAYED RELEASE PELLETS ORAL at 11:11

## 2024-11-29 RX ADMIN — METHOCARBAMOL 500 MG: 500 TABLET ORAL at 08:11

## 2024-11-29 RX ADMIN — PANCRELIPASE 2 CAPSULE: 120000; 24000; 76000 CAPSULE, DELAYED RELEASE PELLETS ORAL at 07:11

## 2024-11-29 RX ADMIN — APIXABAN 2.5 MG: 2.5 TABLET, FILM COATED ORAL at 08:11

## 2024-11-29 RX ADMIN — Medication 400 MG: at 08:11

## 2024-11-29 RX ADMIN — POTASSIUM & SODIUM PHOSPHATES POWDER PACK 280-160-250 MG 1 PACKET: 280-160-250 PACK at 06:11

## 2024-11-29 RX ADMIN — ACETAMINOPHEN 1000 MG: 500 TABLET ORAL at 06:11

## 2024-11-29 RX ADMIN — POLYETHYLENE GLYCOL 3350 17 G: 17 POWDER, FOR SOLUTION ORAL at 08:11

## 2024-11-29 NOTE — ASSESSMENT & PLAN NOTE
ADARSH is likely due to pre-renal azotemia due to intravascular volume depletion. Baseline creatinine is  1.0 on admit . Most recent creatinine and eGFR are listed below.  Recent Labs     11/27/24  0456 11/27/24  1148 11/28/24  0451   CREATININE 2.1* 1.8* 1.3   EGFRNORACEVR 22.2* 26.8* 39.6*        Plan  - ADARSH is improving after transfused 2 units of PRBCs on 11/27.   - Avoid nephrotoxins and renally dose meds for GFR listed above  - Monitor urine output, serial BMP, and adjust therapy as needed  - Improved with IVF's and blood transfusion on 11/27.

## 2024-11-29 NOTE — SUBJECTIVE & OBJECTIVE
Interval History 11/29/2024:  Patient is seen for follow-up PM&R evaluation and recommendations: Participated w/ PT & OT yesterday and therapy recommending high intensity. Patient med stable and ready for discharge.     HPI, Past Medical, Family, and Social History remains the same as documented in the initial encounter.    Scheduled Medications:    acetaminophen  1,000 mg Oral Q8H    apixaban  2.5 mg Oral BID    atorvastatin  20 mg Oral QHS    insulin glargine U-100  5 Units Subcutaneous BID    latanoprost  1 drop Both Eyes QHS    lipase-protease-amylase 24,000-76,000-120,000 units  2 capsule Oral TID WM    magnesium oxide  400 mg Oral Daily    methocarbamoL  500 mg Oral QID    montelukast  10 mg Oral QHS    polyethylene glycol  17 g Oral Daily    potassium, sodium phosphates  1 packet Oral QID (AC & HS)    senna-docusate 8.6-50 mg  1 tablet Oral BID    sodium bicarbonate  1,300 mg Oral TID    vitamin D  1,000 Units Oral Daily       Diagnostic Results:   Labs: Reviewed  ECG: Reviewed  X-Ray: Reviewed    PRN Medications:   Current Facility-Administered Medications:     0.9%  NaCl infusion (for blood administration), , Intravenous, Q24H PRN    0.9%  NaCl infusion (for blood administration), , Intravenous, Q24H PRN    artificial tears, 1 drop, Both Eyes, QID PRN    bisacodyL, 10 mg, Rectal, Daily PRN    dextrose 10%, 12.5 g, Intravenous, PRN    dextrose 10%, 25 g, Intravenous, PRN    glucagon (human recombinant), 1 mg, Intramuscular, PRN    glucose, 16 g, Oral, PRN    glucose, 24 g, Oral, PRN    hydrALAZINE, 50 mg, Oral, Q8H PRN    insulin aspart U-100, 0-10 Units, Subcutaneous, QID (AC + HS) PRN    melatonin, 6 mg, Oral, Nightly PRN    methocarbamoL, 500 mg, Oral, Q6H PRN    ondansetron, 4 mg, Intravenous, Q6H PRN    oxyCODONE, 5 mg, Oral, Q4H PRN    prochlorperazine, 5 mg, Intravenous, Q6H PRN    sodium chloride 0.9%, 10 mL, Intravenous, PRN    Review of Systems   Constitutional:  Positive for activity change.  Negative for fatigue and fever.   HENT:  Negative for sore throat and trouble swallowing.    Eyes:  Negative for visual disturbance.   Respiratory:  Negative for cough and shortness of breath.    Cardiovascular:  Negative for chest pain and leg swelling.   Gastrointestinal:  Negative for abdominal distention and abdominal pain.   Genitourinary:  Negative for difficulty urinating.   Musculoskeletal:  Positive for gait problem. Negative for back pain.   Skin:  Negative for color change.   Neurological:  Positive for weakness. Negative for dizziness, light-headedness and headaches.   Psychiatric/Behavioral:  Negative for agitation and confusion.      Objective:     Vital Signs (Most Recent):  Temp: 98.3 °F (36.8 °C) (11/29/24 0821)  Pulse: 80 (11/29/24 0821)  Resp: 18 (11/29/24 0821)  BP: (!) 140/67 (11/29/24 0821)  SpO2: 96 % (11/29/24 0821)    Vital Signs (24h Range):  Temp:  [97.7 °F (36.5 °C)-98.3 °F (36.8 °C)] 98.3 °F (36.8 °C)  Pulse:  [66-97] 80  Resp:  [18] 18  SpO2:  [95 %-97 %] 96 %  BP: (140-183)/() 140/67         Physical Exam  Vitals and nursing note reviewed.   Constitutional:       Appearance: Normal appearance. She is well-developed.   HENT:      Nose: Nose normal.   Eyes:      Pupils: Pupils are equal, round, and reactive to light.   Pulmonary:      Effort: Pulmonary effort is normal. No respiratory distress.   Abdominal:      General: Bowel sounds are normal.      Palpations: Abdomen is soft.   Musculoskeletal:      Cervical back: Normal range of motion and neck supple.      Comments: R arm with dressing in place  RLE weakness present    Skin:     General: Skin is warm and dry.   Neurological:      Mental Status: She is alert and oriented to person, place, and time. Mental status is at baseline.      Motor: Weakness present.      Gait: Gait abnormal.   Psychiatric:         Mood and Affect: Mood normal.         Behavior: Behavior normal.         Thought Content: Thought content normal.          Judgment: Judgment normal.        NEUROLOGICAL EXAMINATION:     MENTAL STATUS   Oriented to person, place, and time.     CRANIAL NERVES     CN III, IV, VI   Pupils are equal, round, and reactive to light.

## 2024-11-29 NOTE — PLAN OF CARE
11/29/24 1037   Post-Acute Status   Post-Acute Authorization Placement   Post-Acute Placement Status Set-up Complete/Auth obtained   Discharge Plan   Discharge Plan A Rehab     Patient's set-up complete. [facility] order placed for wheelchair van transport to [facility] at 1130AM. Bedside nurse to call report after 11AM to [phone]. Updated patient and family of transport arrangements.    Alcira Hurtado RNCM  Case Management  [facility]  [phone]

## 2024-11-29 NOTE — PROGRESS NOTES
[facility] - [facility]  Physical Medicine & Rehab  Progress Note    Patient Name: Sussy Costa  MRN: [id number]  Admission Date: 11/25/2024  Length of Stay: 4 days  Attending Physician: Dr. Redd     Subjective:     Principal Problem:Closed displaced intertrochanteric fracture of right femur    Hospital Course:   11/28/24: Participated w/ PT. Sit <> Stand Transfer: moderate assistance from bedside chair using rolling walker. Bed <> Chair: moderate assistance and of 2 persons using rolling walker Ambulated 4 steps forward and 4 steps to bed RW modA x 2 ppl.     Interval History 11/29/2024:  Patient is seen for follow-up PM&R evaluation and recommendations: Participated w/ PT & OT yesterday and therapy recommending high intensity. Patient med stable and ready for discharge.     HPI, Past Medical, Family, and Social History remains the same as documented in the initial encounter.    Scheduled Medications:    acetaminophen  1,000 mg Oral Q8H    apixaban  2.5 mg Oral BID    atorvastatin  20 mg Oral QHS    insulin glargine U-100  5 Units Subcutaneous BID    latanoprost  1 drop Both Eyes QHS    lipase-protease-amylase 24,000-76,000-120,000 units  2 capsule Oral TID WM    magnesium oxide  400 mg Oral Daily    methocarbamoL  500 mg Oral QID    montelukast  10 mg Oral QHS    polyethylene glycol  17 g Oral Daily    potassium, sodium phosphates  1 packet Oral QID (AC & HS)    senna-docusate 8.6-50 mg  1 tablet Oral BID    sodium bicarbonate  1,300 mg Oral TID    vitamin D  1,000 Units Oral Daily     Diagnostic Results:   Labs: Reviewed  ECG: Reviewed  X-Ray: Reviewed    PRN Medications:   Current Facility-Administered Medications:     0.9%  NaCl infusion (for blood administration), , Intravenous, Q24H PRN    0.9%  NaCl infusion (for blood administration), , Intravenous, Q24H PRN    artificial tears, 1 drop, Both Eyes, QID PRN    bisacodyL, 10 mg, Rectal, Daily PRN    dextrose 10%, 12.5 g, Intravenous, PRN    dextrose 10%, 25 g,  Intravenous, PRN    glucagon (human recombinant), 1 mg, Intramuscular, PRN    glucose, 16 g, Oral, PRN    glucose, 24 g, Oral, PRN    hydrALAZINE, 50 mg, Oral, Q8H PRN    insulin aspart U-100, 0-10 Units, Subcutaneous, QID (AC + HS) PRN    melatonin, 6 mg, Oral, Nightly PRN    methocarbamoL, 500 mg, Oral, Q6H PRN    ondansetron, 4 mg, Intravenous, Q6H PRN    oxyCODONE, 5 mg, Oral, Q4H PRN    prochlorperazine, 5 mg, Intravenous, Q6H PRN    sodium chloride 0.9%, 10 mL, Intravenous, PRN    Review of Systems   Constitutional:  Positive for activity change. Negative for fatigue and fever.   HENT:  Negative for sore throat and trouble swallowing.    Eyes:  Negative for visual disturbance.   Respiratory:  Negative for cough and shortness of breath.    Cardiovascular:  Negative for chest pain and leg swelling.   Gastrointestinal:  Negative for abdominal distention and abdominal pain.   Genitourinary:  Negative for difficulty urinating.   Musculoskeletal:  Positive for gait problem. Negative for back pain.   Skin:  Negative for color change.   Neurological:  Positive for weakness. Negative for dizziness, light-headedness and headaches.   Psychiatric/Behavioral:  Negative for agitation and confusion.      Objective:     Vital Signs (Most Recent):  Temp: 98.3 °F (36.8 °C) (11/29/24 0821)  Pulse: 80 (11/29/24 0821)  Resp: 18 (11/29/24 0821)  BP: (!) 140/67 (11/29/24 0821)  SpO2: 96 % (11/29/24 0821)    Vital Signs (24h Range):  Temp:  [97.7 °F (36.5 °C)-98.3 °F (36.8 °C)] 98.3 °F (36.8 °C)  Pulse:  [66-97] 80  Resp:  [18] 18  SpO2:  [95 %-97 %] 96 %  BP: (140-183)/() 140/67     Physical Exam  Vitals and nursing note reviewed.   Constitutional:       Appearance: Normal appearance. She is well-developed.   HENT:      Nose: Nose normal.   Eyes:      Pupils: Pupils are equal, round, and reactive to light.   Pulmonary:      Effort: Pulmonary effort is normal. No respiratory distress.   Abdominal:      General: Bowel sounds are  normal.      Palpations: Abdomen is soft.   Musculoskeletal:      Cervical back: Normal range of motion and neck supple.   R arm with dressing in place  RLE weakness present    Skin:     General: Skin is warm and dry.   Neurological:      Mental Status: She is alert and oriented to person, place, and time. Mental status is at baseline.      Motor: Weakness present.      Gait: Gait abnormal.   Psychiatric:         Mood and Affect: Mood normal.         Behavior: Behavior normal.         Thought Content: Thought content normal.         Judgment: Judgment normal.     Assessment/Plan:      * Closed displaced intertrochanteric fracture of right femur s/p IM nail on 11/25/2024  - Dressing in place to R arm and RUE laceration repaired. Imaging revealed R femur intertrochanteric fracture.    - S/p R femur IMN on 11/25/24.   - Per Ortho recommend WBAT RLE  - Related to prolonged/acute hospital course.   -  Encourage mobility, OOB in chair at least 3 hours per day, and early ambulation as appropriate  -  PT/OT evaluate and treat  -  Pain management  -  Monitor for and prevent skin breakdown and pressure ulcers  Early mobility, repositioning/weight shifting every 20-30 minutes when sitting, turn patient every 2 hours, proper mattress/overlay and chair cushioning, pressure relief/heel protector boots  -  DVT prophylaxis    Laceration of right forearm  - dressing to be changed every 5 days and plan to remove sutures 12/5/24    Paroxysmal atrial fibrillation  -  on Eliquis    PM&R Recommendation:     At this time, the PM&R team has reviewed this patient's ongoing medical case including inpatient diagnosis, medical history, clinical examination, labs, vitals, current social and functional history to provide the post-acute recommendation as follows:     RECOMMENDATIONS: inpatient rehabilitation due to good motivation/participation with therapies, has been determined to tolerate 3 hours of therapy and good potential for recovery.      The patient will be admitted for comprehensive interdisciplinary inpatient rehabilitation to address the impairments due to medical diagnosis of Closed displaced intertrochanteric fracture of right femur. The patient will benefit from an inpatient rehabilitation program to promote functional recovery, implement compensatory strategies and will undergo assessment for needs for durable medical equipment for safe discharge to the community. This patient will benefit from a coordinated interdisciplinary rehabilitation program services that require close monitoring and treatment with 24-hour rehabilitative nursing and physical/occupational therapies for 3 hours/day for 5 days/week.This interdisciplinary program will be performed under the direction of a physiatrist.    MEDICAL STABILITY:     At this time, no barriers for post-acute rehab admission.     I will sign off with the transfer of the patient to [facility] liaison who will continue to follow going forward until admission to [facility].        Marisol Hernández NP  Department of Physical Medicine & Rehab   [facility]

## 2024-11-29 NOTE — ASSESSMENT & PLAN NOTE
Age related osteoporosis with current pathological fracture   Patient with mechanical fall at home in bathroom and sustained closed right intertrochanteric fracture related to her known osteoporosis.  - Ortho consulted and patient taken to OR on 11/25/2024 with Dr. Lorenzo Blunt and underwent IM nail of right femur to repair fracture.   - Post-op, patient is weight bear as tolerated to right lower extremity.   - Patient started on Apixiban 2.5 mg po BID post-op for DVT prophylaxis and plan 30 days with end date 12/25/2024.  - PT/OT consulted and recommended high intensity and rehab referrals sent and patient accepted to [facility] when medically ready.   - Surgical bandages to remain in place to right hip and thigh area until Orthopedic clinic follow-up.   - Orthopedic managing surgical site.   - Pain controlled. Continue multimodals with scheduled Tylenol and Robaxin for pain.

## 2024-11-29 NOTE — ASSESSMENT & PLAN NOTE
Patient's most recent phosphorus results are listed below.   Recent Labs     11/26/24  0554 11/27/24  0456 11/28/24  0451   PHOS 4.7* 4.2 2.3*     Plan  - Will treat hypophosphatemia with oral Neutra Phos.   - Monitor daily phosphorus level.

## 2024-11-29 NOTE — DISCHARGE SUMMARY
[facility] Medicine  Discharge Summary      Patient Name: Sussy Costa  MRN: [id number]  MIRIAM: [id number]  Patient Class: IP- Inpatient  Admission Date: 11/25/2024  Hospital Length of Stay: 4 days  Discharge Date and Time: 11/29/2024 11:38 AM  Attending Physician: Peace Redd MD   Discharging Provider: Peace Redd MD  Primary Care Provider: Leon Ramírez MD  [facility] Medicine Team: [facility] MED  Peace Redd MD  Primary Care Team: [facility]    HPI:   Ms. Sussy Costa is an 89-year-old female w/ a h/o left intertrochanteric fracture s/p surgery 04/2024, Type 2 DM, CKD stage 3, HTN, pancreatic insufficiency, Asthma, Afib that presents for evaluation of hip pain. She was getting up to walk to the bathroom this morning an hour prior to arrival, took 2 steps and then felt herself falling. She fell onto her right side and attempted to grab her dresser and scraped her arm. She is unsure if she hit her head or had a syncopal event before falling. She denies any preceding chest pain, palpitations, lightheadedness or dizziness. She reports 8/10 right-sided hip pain that extends to mid thigh. She reports minimal blood loss from her arm. Bleeding was controlled with a dressing. She has not eaten since dinner at 6:00 pm yesterday. Sip of water at 11:00 p.m. yesterday for meds. She denied any chest pain, shortness of breath, abdominal pain. She did not take her medications this morning.     In ED, Pt hypertensive 228/105, otherwise AFVSS on RA. WBC 18, hgb 11.2. K 3.3. . Trop 0.022. XR b/l hips: Acute fracture of the right hip. CTH: No acute intracranial hemorrhage. CT C spine: Broad central disc herniation with stable extrusion and superior migration fine the C5 vertebral body flattens the traversing cord with presumably some encroachment on the exiting right C6 nerve root. No acute cervical fracture. Given IV morphine, IV antiemetics. RUE lac repaired.        11/25/2024  Procedure(s) (LRB):  INSERTION, INTRAMEDULLARY CAYDEN, FEMUR (Right)    Surgeon(s):  Lorenzo Blunt MD Renshaw, Andrew, MD      Hospital Course:   Patient admitted to [facility] Medicine Team H: Hip Fracture team and started on Hip Fracture Pathway with Orthopedic surgery consult for closed displaced right intertrochanteric femur fracture. Patient was seen and evaluated by Orthopedic surgery who recommended operative repair of hip fracture. Patient was medically optimized prior to surgery and was taken to OR after optimization on 11/25/2024. Patient underwent right hip cephalomedullary nail fixation by Dr. Lorenzo Blunt. Post-op patient weight bear as tolerated to the right lower extremity as per Orthopedics recommendation. Patient placed on Apixiban 2.5 mg po BID and DEANNA/SCD's for DVT prophylaxis post-op and will need for total of 30 days. Perineural pain catheter placed by Anesthesia Pain Service with continuous infusion of Ropivacaine to help with pain control post-op and Anesthesia Pain Service managing while patient in the hospital. Patient placed on multimodal pain management post-op with Tylenol 1000 mg po every 8 hours and Robaxin 500 mg po 4 times daily prn for breakthrough pain post-op and will continue. PT/OT consulted post-op and evaluated patient. Patient progressing well with PT and OT and recommended  high intensity  when medically ready for hospital discharge. Rehab referral sent and patient accepted to [facility] when medically ready. Patient post-op noted ot have decrease in Hgb down to 7.3 on 11/27 and worsening Creatinine consistent with ADARSH. Creatinine 1.0 on admit but increased to 1.8 on 11/26 and 1.9 on 11/27. Home Losartan held due to ADARSH. ADARSH felt related to volume depletion and with worsening anemia felt would benefit from blood transfusion so transfused 2 units of PRBCs on 11/27. Patient with no clinical sign of active bleeding. Worsening anemia  felt related to expected blood loss from hip fracture and surgery. Creatinine improved to 1.3 on 11/28 and Hgb improved to 9.5 on 11/28 after transfused 2 units of PRBCs on 11/27. Platelets stable at 140,000 on 11/28. Platelet count back to normal at 151,000 on 11/29. Hgb stable at 9.1 on 11/29. Creatinine normal at 0.9 on 11/29. ADARSH resolved so patient okay to resume her home Losartan for her HTN on discharge to [facility]. Patient reports pain well controlled on day of discharge on 11/29. Patient medically ready for discharge and discharged in good condition to [facility] on 11/29. Perineural catheter removed prior to discharge and patient discharged on multimodal pain meds. Surgical bandages to remain in place to right hip and thigh area until Orthopedic clinic follow-up.        Goals of Care Treatment Preferences:  Code Status: Full Code      SDOH Screening:  The patient was screened for utility difficulties, food insecurity, transport difficulties, housing insecurity, and interpersonal safety and there were no concerns identified this admission.     Consults:   Consults (From admission, onward)          Status Ordering Provider     Inpatient consult to Midline team  Once        Provider:  (Not yet assigned)    Completed ALAN ALEXANDER     Inpatient consult to Midline team  Once        Provider:  (Not yet assigned)    Completed ALAN ALEXANDER     Inpatient consult to Physical Medicine Rehab  Once        Provider:  (Not yet assigned)    Completed ALAN ALEXANDER     Inpatient consult to Orthopedic Surgery  Once        Provider:  (Not yet assigned)    Completed HERIBERTO JACK            Neuro  Abnormal computed tomography of cervical spine  - CT C spine: No acute cervical fracture. 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 No symptoms  but if develops would refer to outpatient Neurosurgery clinic.      Ophtho  Primary open angle glaucoma (POAG) of both eyes, mild stage  Chronic and controlled. Continue home Latanoprost eye drops to treat on discharge.     Pulmonary  Moderate persistent asthma without complication  Chronic and controlled. Patient Trelegy Ellipta inhaler at home but not on formulary and hold as inpatient but resume on discharge. Follows closely with Pulmonary as outpatient.     Cardiac/Vascular  Paroxysmal atrial fibrillation  Patient has paroxysmal (<7 days) atrial fibrillation. Patient is currently in sinus rhythm. [id number] Score: 4. The patients heart rate in the last 24 hours is as follows:  Pulse  Min: 78  Max: 97     Antiarrhythmics   None    Anticoagulants  None at home    Plan  - Replete lytes with a goal of K>4, Mg >2  - Patient's afib is currently controlled.  - Patient not on long term anticoagulation as outpatient to treat.    Primary hypertension  Patients blood pressure range in the last 24 hours was: BP  Min: 98/54  Max: 228/105.    Plan  - BP is controlled, no changes needed to their regimen  - Home Losartan on hold due to ADARSH.   - Okay to resume home Losartan 100 mg po daily for her HTN on discharge as ADARSH resolved. Losartan held in hospital due to ADARSH.     Renal/  Metabolic acidosis  Related to ADARSH. Patient placed on sodium bicarbonate tablets to treat and improving. Continue sodium bicarbonate tablets on discharge for 7 days.     Hypophosphatemia  Patient's most recent phosphorus results are listed below.   Recent Labs     11/27/24  0456 11/28/24  0451 11/29/24  0534   PHOS 4.2 2.3* 2.0*       Plan  - Will treat hypophosphatemia with oral Neutra Phos on discharge 1 packet po with meals x 2 days on discharge and then will need level rechecked at [facility] to see if needs further replacement.        ADARSH (acute kidney injury)-resolved as of 11/29/2024  Resolved on discharge. ADARSH is likely due to pre-renal  azotemia due to intravascular volume depletion. Baseline creatinine is  1.0 on admit . Most recent creatinine and eGFR are listed below.  Recent Labs     11/27/24  1148 11/28/24  0451 11/29/24  0534   CREATININE 1.8* 1.3 0.9   EGFRNORACEVR 26.8* 39.6* >60.0        Plan  - ADARSH is resolved after transfused 2 units of PRBCs on 11/27.   - Avoid nephrotoxins and renally dose meds for GFR listed above  - Improved with IVF's and blood transfusion on 11/27.   - Okay to resume home Losartan 100 mg po daily for her HTN on discharge as ADARSH resolved. Losartan held in hospital due to ADARSH.     Oncology  Acute blood loss anemia  Controlled. Anemia is likely due to acute blood loss which was from surgery and hip fracture and expected blood loss . Most recent hemoglobin and hematocrit are listed below.  Recent Labs     11/27/24  1148 11/28/24  0451   HGB 7.3* 9.5*   HCT 22.9* 28.9*       Plan  - Monitor serial CBC: Daily  - Transfuse PRBC if patient becomes hemodynamically unstable, symptomatic or H/H drops below 7/21.  - Patient has received 2 units of PRBCs on 11/27 for Hgb 7.3.  - Patient's anemia is currently improving on 11/28 after transfusion on 11/27. Hgb 9.5 on 11/28.     Endocrine  Type 2 diabetes mellitus with stage 3a chronic kidney disease, with long-term current use of insulin  Patient's FSGs are controlled on current medication regimen.   At home was on :  Jardiance 10 mg daily  Lantus 5 units in AM and 5 units in the PM   Novolog 3-5-4 units before meals   Add correction scale if needed.   Blood sugar 180 to 230 add 1 units   Blood sugar 231 to 280 add 2 units   Blood sugar greater than 281 add 3 units     Last A1c reviewed-   Lab Results   Component Value Date    HGBA1C 5.9 (H) 11/25/2024     Most recent fingerstick glucose reviewed-   Recent Labs   Lab 11/28/24  2145 11/29/24  0736   POCTGLUCOSE 205* 154*       Current correctional scale  Medium  Maintain anti-hyperglycemic dose as follows-   Antihyperglycemics  (From admission, onward)      None          Hold Oral hypoglycemics while patient is in the hospital.  Monitor blood sugars with meals and at bedtime in hospital.  Target blood sugars 140-180 in hospital.  Diabetic diet.   Resume home regimen on discharge.     Vitamin D insufficiency  Present on admit. Vitamin D 28 on admit and started on oral replacement 1000 units po daily and continue on discharge.     GI  Pancreatic insufficiency  Chronic condition. Continue home Creon po TID with meals to treat.     Orthopedic  * Closed displaced intertrochanteric fracture of right femur s/p IM nail on 11/25/2024  Age related osteoporosis with current pathological fracture   Patient with mechanical fall at home in bathroom and sustained closed right intertrochanteric fracture related to her known osteoporosis.  - Ortho consulted and patient taken to OR on 11/25/2024 with Dr. Lorenzo Blunt and underwent IM nail of right femur to repair fracture.   - Post-op, patient is weight bear as tolerated to right lower extremity and continue on discharge.   - Continue Apixiban 2.5 mg po BID post-op for DVT prophylaxis and plan 30 days with end date 12/25/2024. Patient to continue Apixiban on discharge.   - PT/OT consulted and recommended high intensity and rehab referrals sent and patient accepted to [facility] when medically ready. Patient medically ready and discharged in good condition to [facility] on 11/29.   - Surgical bandages to remain in place to right hip and thigh area until Orthopedic clinic follow-up.   - Pain controlled. Continue multimodals with scheduled Tylenol and Robaxin for pain on discharge.      Laceration of right forearm  Patient with right arm laceration on admit and (5) sutures placed in ER and covered on admit, due in 7-10 days for removal on 12/3. Continue local wound care to right arm and added to Rehab orders. Suture removal can be done at [facility] on 12/3 to right forearm.        Other  Intercostal pain-resolved as of 11/29/2024  Resolved. Patient had some intercostal pain on 11/27 but resolved on 11/28. EKG and troponin unremarkable. Non cardiac pain.         Final Active Diagnoses:    Diagnosis Date Noted POA    PRINCIPAL PROBLEM:  Closed displaced intertrochanteric fracture of right femur s/p IM nail on 11/25/2024 [S72.141A] 11/25/2024 Yes    Metabolic acidosis [E87.20] 11/26/2024 No    Acute blood loss anemia [D62] 03/31/2024 Yes    Laceration of right forearm [S51.811A] 11/25/2024 Yes    Abnormal computed tomography of cervical spine [R93.7] 11/25/2024 Yes    Primary hypertension [I10] 09/24/2012 Yes     Chronic    Vitamin D insufficiency [E55.9] 03/31/2024 Yes    Paroxysmal atrial fibrillation [I48.0] 03/01/2023 Yes     Chronic    Type 2 diabetes mellitus with stage 3a chronic kidney disease, with long-term current use of insulin [E11.22, N18.31, Z79.4]  Not Applicable    Moderate persistent asthma without complication [J45.40] 10/26/2022 Yes    Pancreatic insufficiency [K86.89] 12/09/2022 Yes    Primary open angle glaucoma (POAG) of both eyes, mild stage [H40.1131]  Yes    Age-related osteoporosis with current pathological fracture [M80.00XA] 11/25/2024 Yes      Problems Resolved During this Admission:    Diagnosis Date Noted Date Resolved POA    ADARSH (acute kidney injury) [N17.9] 03/31/2024 11/29/2024 Yes    Hypophosphatemia [E83.39] 10/27/2022 11/29/2024 Yes    Intercostal pain [R07.82] 11/27/2024 11/29/2024 No    Nausea [R11.0] 11/26/2024 11/28/2024 No    Normocytic anemia [D64.9] 11/25/2024 11/28/2024 Yes    Postural dizziness with presyncope [R42, R55] 11/25/2024 11/28/2024 Yes    Vitamin D deficiency [E55.9] 11/27/2024 11/28/2024 Yes    Lactic acidosis [E87.20] 11/26/2024 11/27/2024 No    Leukocytosis [D72.829] 11/25/2024 11/27/2024 Yes    Hypokalemia [E87.6] 11/25/2024 11/27/2024 Yes       Discharged Condition: good    Disposition: Rehab Facility ([facility])    Follow  Up:    Future Appointments   Date Time Provider Department Center   12/9/2024 12:30 PM Johan Locke, NP [facility] ORTHO [address]   12/20/2024  1:00 PM Leon Ramírez MD [facility] IM [place]   1/6/2025  1:45 PM Johan Locke, NP [facility] ORTHO [address]   1/7/2025 10:30 AM LAB, APPOINTMENT [facility] INTMED [facility] IM [address] [facility]   1/8/2025 10:30 AM Johan Locke, NP [facility] ORTHO [address]   1/14/2025  1:30 PM Heike Ye, GHISLAINE [facility] ENDODIA [address]   2/20/2025 11:45 AM INJECTION [facility]   2/27/2025  2:15 PM Alen Campa, DPM [facility] POD [address]   4/9/2025  2:00 PM LAB, HEMONC CANCER BLDG [facility]   4/10/2025  2:30 PM Damaris Valadez, MELISSA [facility] HEMONC2 [address] [facility]       Patient Instructions:      Ambulatory referral/consult to Orthopedics   Standing Status: Future   Referral Priority: Routine Referral Type: Consultation   Requested Specialty: Orthopedic Surgery   Number of Visits Requested: 1     Ambulatory referral/consult to Orthopedics Fracture Care   Standing Status: Future   Referral Priority: Routine Referral Type: Consultation   Requested Specialty: Orthopedic Surgery   Number of Visits Requested: 1     Ambulatory referral/consult to Outpatient Case Management   Referral Priority: Routine Referral Type: Consultation   Referral Reason: Specialty Services Required   Number of Visits Requested: 1     Diet diabetic     Notify your health care provider if you experience any of the following:  temperature >100.4     Notify your health care provider if you experience any of the following:  persistent nausea and vomiting or diarrhea     Notify your health care provider if you experience any of the following:  severe uncontrolled pain     Notify your health care provider if you experience any of the following:  redness, tenderness, or signs of infection (pain, swelling, redness, odor or green/yellow discharge around incision site)     Notify your health care provider if  you experience any of the following:  difficulty breathing or increased cough     Notify your health care provider if you experience any of the following:  severe persistent headache     Notify your health care provider if you experience any of the following:  worsening rash     Notify your health care provider if you experience any of the following:  persistent dizziness, light-headedness, or visual disturbances     Notify your health care provider if you experience any of the following:  increased confusion or weakness     Leave dressing on - Keep it clean, dry, and intact until clinic visit     Weight bearing restrictions (specify):   Order Comments: Weight bear as tolerated to right lower extremity       Significant Diagnostic Studies: Labs: CMP   Recent Labs   Lab 11/28/24  0451 11/29/24  0534    138   K 4.6 4.1   * 108   CO2 21* 25   * 145*   BUN 43* 28*   CREATININE 1.3 0.9   CALCIUM 8.2* 7.7*   ANIONGAP 7* 5*    and CBC   Recent Labs   Lab 11/28/24  0451 11/29/24  0534   WBC 12.47 10.45   HGB 9.5* 9.1*   HCT 28.9* 28.5*   * 151       Pending Diagnostic Studies:       None           Medications:  Reconciled Home Medications:      Medication List        START taking these medications      apixaban 2.5 mg Tab  Commonly known as: ELIQUIS  Take 1 tablet (2.5 mg total) by mouth 2 (two) times daily. DVT prophylaxis after hip fracture surgery. End date 12/25/2024.     melatonin 3 mg tablet  Commonly known as: MELATIN  Take 2 tablets (6 mg total) by mouth nightly as needed for Insomnia.     methocarbamoL 500 MG Tab  Commonly known as: ROBAXIN  Take 1 tablet (500 mg total) by mouth 4 (four) times daily. for 10 days     oxyCODONE 5 MG immediate release tablet  Commonly known as: ROXICODONE  Take 1 tablet (5 mg total) by mouth every 4 (four) hours as needed (Moderate to severe pain).     polyethylene glycol 17 gram Pwpk  Commonly known as: GLYCOLAX  Take 17 g by mouth once daily.  Start taking  "on: November 30, 2024     potassium, sodium phosphates 280-160-250 mg Pwpk  Commonly known as: PHOS-NAK  Take 1 packet by mouth 4 (four) times daily before meals and nightly. End date 12/1/2024. for 2 days     sodium bicarbonate 650 MG tablet  Take 1 tablet (650 mg total) by mouth 3 (three) times daily. End date 12/6/2024. for 7 days            CHANGE how you take these medications      acetaminophen 500 MG tablet  Commonly known as: TYLENOL  Take 2 tablets (1,000 mg total) by mouth every 8 (eight) hours.  What changed:   when to take this  reasons to take this     insulin aspart U-100 100 unit/mL (3 mL) Inpn pen  Commonly known as: NovoLOG  Inject 0-10 Units into the skin before meals and at bedtime as needed (Hyperglycemia).  What changed:   how much to take  when to take this  reasons to take this     PROLIA 60 mg/mL Syrg  Generic drug: denosumab  Inject 1 mL (60 mg total) into the skin every 6 (six) months. Hold at rehab  What changed: additional instructions     senna-docusate 8.6-50 mg 8.6-50 mg per tablet  Commonly known as: PERICOLACE  Take 1 tablet by mouth 2 (two) times daily.  What changed: when to take this            CONTINUE taking these medications      ACCU-CHEK GUIDE TEST STRIPS Strp  Generic drug: blood sugar diagnostic  TEST FOUR TIMES DAILY WITH MEALS AND NIGHTLY     ascorbic acid (vitamin C) 500 MG tablet  Commonly known as: VITAMIN C  Take 500 mg by mouth once daily.     atorvastatin 20 MG tablet  Commonly known as: LIPITOR  Take 1 tablet (20 mg total) by mouth every evening.     BD ULTRA-FINE SHORT PEN NEEDLE 31 gauge x 5/16" Ndle  Generic drug: pen needle, diabetic  USE with insulin pen 5 TIMES A DAY     CREON 24,000-76,000 -120,000 unit capsule  Generic drug: lipase-protease-amylase 24,000-76,000-120,000 units  TAKE 2 CAPSULES WITH MEALS AND 1 CAPSULE WITH SNACKS.     empagliflozin 10 mg tablet  Commonly known as: JARDIANCE  Take 1 tablet (10 mg total) by mouth once daily.     IRON " ORAL  Take 1 tablet by mouth every other day.     lancets Misc  Commonly known as: ACCU-CHEK SOFTCLIX LANCETS  1 each by Misc.(Non-Drug; Combo Route) route 4 (four) times daily.     LANTUS SOLOSTAR U-100 INSULIN 100 unit/mL (3 mL) Inpn pen  Generic drug: insulin glargine U-100 (Lantus)  Inject 5 Units into the skin once daily AND 5 Units every evening.     latanoprost 0.005 % ophthalmic solution  Place 1 drop into both eyes every evening.     losartan 100 MG tablet  Commonly known as: COZAAR  Take 1 tablet (100 mg total) by mouth once daily.     magnesium oxide 400 mg (241.3 mg magnesium) tablet  Commonly known as: MAG-OX  Take 400 mg by mouth 2 (two) times daily.     montelukast 10 mg tablet  Commonly known as: SINGULAIR  Take 1 tablet (10 mg total) by mouth every evening.     olopatadine 0.1 % ophthalmic solution  Commonly known as: PATANOL  Place 1 drop into both eyes 2 (two) times daily as needed for Allergies. 1 Drops Ophthalmic Twice a day     TRELEGY ELLIPTA 200-62.5-25 mcg inhaler  Generic drug: fluticasone-umeclidin-vilanter  Inhale 1 puff into the lungs once daily.     vitamin D 1000 units Tab  Commonly known as: VITAMIN D3  Take 1,000 Units by mouth once daily.            STOP taking these medications      TUMS ORAL     UNABLE TO FIND              Indwelling Lines/Drains at time of discharge:   Lines/Drains/Airways       None                   32 minutes of time spent on discharge, including examining the patient, providing discharge instructions, arranging follow-up and documentation.            Peace Redd MD  Department of Hospital Medicine  [facility] - Surgery

## 2024-11-29 NOTE — ASSESSMENT & PLAN NOTE
Age related osteoporosis with current pathological fracture   Patient with mechanical fall at home in bathroom and sustained closed right intertrochanteric fracture related to her known osteoporosis.  - Ortho consulted and patient taken to OR on 11/25/2024 with Dr. Lorenzo Blunt and underwent IM nail of right femur to repair fracture.   - Post-op, patient is weight bear as tolerated to right lower extremity and continue on discharge.   - Continue Apixiban 2.5 mg po BID post-op for DVT prophylaxis and plan 30 days with end date 12/25/2024. Patient to continue Apixiban on discharge.   - PT/OT consulted and recommended high intensity and rehab referrals sent and patient accepted to [facility] when medically ready. Patient medically ready and discharged in good condition to [facility] on 11/29.   - Surgical bandages to remain in place to right hip and thigh area until Orthopedic clinic follow-up.   - Pain controlled. Continue multimodals with scheduled Tylenol and Robaxin for pain on discharge.

## 2024-11-29 NOTE — ASSESSMENT & PLAN NOTE
Patient's most recent phosphorus results are listed below.   Recent Labs     11/27/24  0456 11/28/24  0451 11/29/24  0534   PHOS 4.2 2.3* 2.0*       Plan  - Will treat hypophosphatemia with oral Neutra Phos on discharge 1 packet po with meals x 2 days on discharge and then will need level rechecked at [facility] to see if needs further replacement.

## 2024-11-29 NOTE — ASSESSMENT & PLAN NOTE
- CT C spine: No acute cervical fracture. 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 No symptoms but if develops would refer to outpatient Neurosurgery clinic.

## 2024-11-29 NOTE — PROGRESS NOTES
Patient discharging [facility], discharge instructions called and review in detail with Thelma FUENTES, allowed time for questions, all questions answered, IV removed, gauze and tape applied, bleeding controlled,  transportation at  for .

## 2024-11-29 NOTE — ASSESSMENT & PLAN NOTE
Patient's FSGs are controlled on current medication regimen.   At home was on :  Jardiance 10 mg daily  Lantus 5 units in AM and 5 units in the PM   Novolog 3-5-4 units before meals   Add correction scale if needed.   Blood sugar 180 to 230 add 1 units   Blood sugar 231 to 280 add 2 units   Blood sugar greater than 281 add 3 units     Last A1c reviewed-   Lab Results   Component Value Date    HGBA1C 5.9 (H) 11/25/2024     Most recent fingerstick glucose reviewed-   Recent Labs   Lab 11/28/24  2145 11/29/24  0736   POCTGLUCOSE 205* 154*       Current correctional scale  Medium  Maintain anti-hyperglycemic dose as follows-   Antihyperglycemics (From admission, onward)      None          Hold Oral hypoglycemics while patient is in the hospital.  Monitor blood sugars with meals and at bedtime in hospital.  Target blood sugars 140-180 in hospital.  Diabetic diet.   Resume home regimen on discharge.

## 2024-11-29 NOTE — PLAN OF CARE
Problem: Skin Injury Risk Increased  Goal: Skin Health and Integrity  Outcome: Progressing     Problem: Hip Fracture Medical Management  Goal: Optimal Coping with Change in Health Status  Outcome: Progressing  Goal: Absence of Bleeding  Outcome: Progressing  Goal: Effective Bowel Elimination  Outcome: Progressing  Goal: Baseline Cognitive Function Maintained  Outcome: Progressing  Goal: Absence of Embolism  Outcome: Progressing  Goal: Fracture Stability  Outcome: Progressing  Goal: Optimal Functional Performance  Outcome: Progressing  Goal: Pain Control and Function  Outcome: Progressing  Goal: Effective Urinary Elimination  Outcome: Progressing     Problem: Surgery Nonspecified  Goal: Absence of Bleeding  Outcome: Progressing  Goal: Effective Bowel Elimination  Outcome: Progressing  Goal: Fluid and Electrolyte Balance  Outcome: Progressing  Goal: Blood Glucose Level Within Targeted Range  Outcome: Progressing  Goal: Absence of Infection Signs and Symptoms  Outcome: Progressing  Goal: Anesthesia/Sedation Recovery  Outcome: Progressing  Goal: Optimal Pain Control and Function  Outcome: Progressing  Goal: Nausea and Vomiting Relief  Outcome: Progressing  Goal: Effective Urinary Elimination  Outcome: Progressing  Goal: Effective Oxygenation and Ventilation  Outcome: Progressing     Problem: Anesthesia/Analgesia, Neuraxial  Goal: Safe, Effective Infusion Delivery  Outcome: Progressing  Goal: Absence of Infection Signs and Symptoms  Outcome: Progressing  Goal: Nausea and Vomiting Relief  Outcome: Progressing  Goal: Effective Pain Control  Outcome: Progressing  Goal: Effective Oxygenation and Ventilation  Outcome: Progressing  Goal: Baseline Motor Function  Outcome: Progressing  Goal: Effective Urinary Elimination  Outcome: Progressing     Problem: Fall Injury Risk  Goal: Absence of Fall and Fall-Related Injury  Outcome: Progressing     Problem: Adult Inpatient Plan of Care  Goal: Plan of Care Review  Outcome:  Progressing  Goal: Patient-Specific Goal (Individualized)  Outcome: Progressing  Goal: Absence of Hospital-Acquired Illness or Injury  Outcome: Progressing  Goal: Optimal Comfort and Wellbeing  Outcome: Progressing  Goal: Readiness for Transition of Care  Outcome: Progressing     Problem: Infection  Goal: Absence of Infection Signs and Symptoms  Outcome: Progressing     Problem: Diabetes Comorbidity  Goal: Blood Glucose Level Within Targeted Range  Outcome: Progressing     Problem: Wound  Goal: Optimal Coping  Outcome: Progressing  Goal: Optimal Functional Ability  Outcome: Progressing  Goal: Absence of Infection Signs and Symptoms  Outcome: Progressing  Goal: Improved Oral Intake  Outcome: Progressing  Goal: Optimal Pain Control and Function  Outcome: Progressing  Goal: Skin Health and Integrity  Outcome: Progressing  Goal: Optimal Wound Healing  Outcome: Progressing     Problem: Acute Kidney Injury/Impairment  Goal: Fluid and Electrolyte Balance  Outcome: Progressing  Goal: Improved Oral Intake  Outcome: Progressing  Goal: Effective Renal Function  Outcome: Progressing    Pt is AAO x4 calm cooperative accepting of care. Pt complained of pain as scheduled medication were given as intervention was tolerated. Pt attempted BM via bedpan with no output. Skin, IV and purewick placement were assessed and documented. Pt provided rest and comfort measures by repositioning bed, applying blanket and a quiet environment. Safety measures were implemented as bed is low, HOB elevated, side rails raised x2 with call light in reach. Continue with plan of care.

## 2024-11-29 NOTE — PLAN OF CARE
[facility]     Department of Hospital Medicine     [address] [place] [address]     [phone] after hours  [phone] fax                                        FACILITY TRANSFER ORDERS     11/29/2024    Admit to: [facility]    Diagnoses:  Active Hospital Problems    Diagnosis  POA    *Closed displaced intertrochanteric fracture of right femur s/p IM nail on 11/25/2024 [S72.141A]  Yes     Priority: 1 - High    Intercostal pain [R07.82]  No     Priority: 3     Metabolic acidosis [E87.20]  No     Priority: 4     Acute blood loss anemia [D62]  Yes     Priority: 5     Laceration of right forearm [S51.811A]  Yes     Priority: 6     Abnormal computed tomography of cervical spine [R93.7]  Yes     Priority: 9     Primary hypertension [I10]  Yes     Priority: 10      Chronic     Home medications include losartan      Vitamin D insufficiency [E55.9]  Yes     Priority: 11     Paroxysmal atrial fibrillation [I48.0]  Yes     Priority: 12      Chronic    Type 2 diabetes mellitus with stage 3a chronic kidney disease, with long-term current use of insulin [E11.22, N18.31, Z79.4]  Not Applicable     Priority: 13      Dx updated per 2019 IMO Load      Moderate persistent asthma without complication [J45.40]  Yes     Priority: 14      Well controlled on Trelegy, singulair, and albuterol prn.      Pancreatic insufficiency [K86.89]  Yes     Priority: 15      Diagnosed in setting of weight loss and bowel incontinence 2022. Has chronic pancreatitis on imaging. Home medications include Creon      Primary open angle glaucoma (POAG) of both eyes, mild stage [H40.1131]  Yes     Priority: 16      Dx updated per 2019 IMO Load      Age-related osteoporosis with current pathological fracture [M80.00XA]  Yes      Resolved Hospital Problems    Diagnosis Date Resolved POA    ADARSH (acute kidney injury) [N17.9] 11/29/2024 Yes     Priority: 2     Hypophosphatemia  [E83.39] 11/29/2024 Yes     Priority: 2     Nausea [R11.0] 11/28/2024 No     Priority: 5     Normocytic anemia [D64.9] 11/28/2024 Yes     Priority: 5     Postural dizziness with presyncope [R42, R55] 11/28/2024 Yes     Priority: 8     Vitamin D deficiency [E55.9] 11/28/2024 Yes     Priority: 17     Lactic acidosis [E87.20] 11/27/2024 No    Leukocytosis [D72.829] 11/27/2024 Yes    Hypokalemia [E87.6] 11/27/2024 Yes       Vital Signs: Routine.    Allergies:  Review of patient's allergies indicates:   Allergen Reactions    Aspirin Other (See Comments)     Asthma    TRIAD OF NASAL POLYPS, ASA  ALLERGY AND ASTHMA.        Aspirin Other (See Comments)    Nsaids (non-steroidal anti-inflammatory drug) Other (See Comments)     AVOID DUE TO TRIAD OF ASA ALLERGY, NASAL POLYPS,AND ASTHMA  AVOID DUE TO TRIAD OF ASA ALLERGY, NASAL POLYPS,AND ASTHMA    Penicillin      Other reaction(s): Rash    9/30/20: tolerates ceftriaxone    Penicillin g Other (See Comments)     Other reaction(s): Rash    9/30/20: tolerates ceftriaxone       Code Status: Full Code     Diet: diabetic diet: 2000 calorie     Activities:   - Activity as tolerated   - Up in a chair each morning as tolerated   - Ambulate with assistance to bathroom   - May use walker, cane, or self-propelled wheelchair    Weight Bearing Status: Weight bear as tolerated to right lower extremity    Nursing: Out of bed BID, Up with assistance  Measure height and weight on admit    Nursing Precautions:         - Fall precautions per nursing home protocol      Labs: Per facility protocol    CONSULTS:      Physical Therapy to evaluate and treat 5 times a week      Occupational Therapy to evaluate and treat 5 times a week          MISCELLANEOUS CARE:  Routine Skin for Bedridden Patients: Instruct patient/caregiver to apply moisture barrier cream to all skin folds and wet areas in perineal area daily and after baths and all bowel movements.    WOUND CARE ORDERS:  Keep surgical dressings in  place that was applied post-op and leave on right hip and thigh areas and do not remove until Orthopedic clinic follow-up. Assess surgical dressing with each treatment. Call MD if any drainage reaches border to border of dressing horizontally, signs or symptoms of infection, temp >101 F, induration, swelling or redness.      Right arm laceration: Gently remove old dressing to right arm; clean with wound cleanser and pat dry. Cover open skin tears with Urgotul AG, then foam, then secure with cast padding. Change dressing every 5 days unless saturated or loose and continue until skin tears have closed/healed.    Remove sutures from right arm laceration on 12/5/2024.     DIABETES CARE:     SN to perform and educate Diabetic management with blood glucose monitoring:, Fingerstick blood sugar before meals and at bedtime, and Report CBG < 60 or > 350 to physician.                                          Insulin Sliding Scale          Glucose  Novolog Insulin Subcutaneous        0 - 60   Orange juice or glucose tablet, hold insulin      No insulin   201-250  2 units   251-300  4 units   301-350  6 units   351-400  8 units   >400   10 units then call physician    Medications:     Current Discharge Medication List        START taking these medications    Details   apixaban (ELIQUIS) 2.5 mg Tab Take 1 tablet (2.5 mg total) by mouth 2 (two) times daily. DVT prophylaxis after hip fracture surgery. End date 12/25/2024.      melatonin (MELATIN) 3 mg tablet Take 2 tablets (6 mg total) by mouth nightly as needed for Insomnia.      methocarbamoL (ROBAXIN) 500 MG Tab Take 1 tablet (500 mg total) by mouth 4 (four) times daily. for 10 days  Qty: 40 tablet, Refills: 0      oxyCODONE (ROXICODONE) 5 MG immediate release tablet Take 1 tablet (5 mg total) by mouth every 4 (four) hours as needed (Moderate to severe pain).    Comments: N/A  Associated Diagnoses: Closed 2-part intertrochanteric fracture of left femur with routine  healing, subsequent encounter; Metabolic acidosis      polyethylene glycol (GLYCOLAX) 17 gram PwPk Take 17 g by mouth once daily.      potassium, sodium phosphates (PHOS-NAK) 280-160-250 mg PwPk Take 1 packet by mouth 4 (four) times daily before meals and nightly. End date 12/1/2024.       sodium bicarbonate 650 MG tablet Take 1 tablet (650 mg total) by mouth 3 (three) times daily. End date 12/6/2024.            CONTINUE these medications which have CHANGED    Details      insulin glargine U-100, Lantus, (LANTUS SOLOSTAR U-100 INSULIN) 100 unit/mL (3 mL) InPn pen Inject 5 Units into the skin once daily AND 5 Units every evening.    Associated Diagnoses: Type 2 diabetes mellitus with microalbuminuria, without long-term current use of insulin           CONTINUE these medications which have NOT CHANGED    Details   acetaminophen (TYLENOL) 500 MG tablet Take 2 tablets (1,000 mg total) by mouth every 8 (eight) hours.  Refills: 0      ascorbic acid, vitamin C, (VITAMIN C) 500 MG tablet Take 500 mg by mouth once daily.      atorvastatin (LIPITOR) 20 MG tablet Take 1 tablet (20 mg total) by mouth every evening.  Qty: 90 tablet, Refills: 0         empagliflozin (JARDIANCE) 10 mg tablet Take 1 tablet (10 mg total) by mouth once daily.  Qty: 30 tablet, Refills: 11    Associated Diagnoses: Diastolic dysfunction; Chronic heart failure with preserved ejection fraction (HFpEF)      ferrous sulfate (IRON ORAL) Take 1 tablet by mouth every other day.      fluticasone-umeclidin-vilanter (TRELEGY ELLIPTA) 200-62.5-25 mcg inhaler Inhale 1 puff into the lungs once daily.  Qty: 90 each, Refills: 3    Associated Diagnoses: Moderate persistent asthma without complication      latanoprost 0.005 % ophthalmic solution Place 1 drop into both eyes every evening.      lipase-protease-amylase 24,000-76,000-120,000 units (CREON) 24,000-76,000 -120,000 unit capsule TAKE 2 CAPSULES WITH MEALS AND 1 CAPSULE WITH SNACKS.  Qty: 200 capsule, Refills: 8     Associated Diagnoses: Chronic pancreatitis, unspecified pancreatitis type      losartan (COZAAR) 100 MG tablet Take 1 tablet (100 mg total) by mouth once daily.  Qty: 90 tablet, Refills: 3    Comments: .  Associated Diagnoses: Primary hypertension; Diastolic dysfunction; Chronic heart failure with preserved ejection fraction (HFpEF)      magnesium oxide (MAG-OX) 400 mg (241.3 mg magnesium) tablet Take 400 mg by mouth 2 (two) times daily.      montelukast (SINGULAIR) 10 mg tablet Take 1 tablet (10 mg total) by mouth every evening.  Qty: 90 tablet, Refills: 1         senna-docusate 8.6-50 mg (PERICOLACE) 8.6-50 mg per tablet Take 1 tablet by mouth 2 (two) times daily.      vitamin D (VITAMIN D3) 1000 units Tab Take 1,000 Units by mouth once daily.              STOP taking these medications       calcium carbonate (TUMS ORAL) Comments:   Reason for Stopping:         UNABLE TO FIND Comments:   Reason for Stopping:                Follow-up:   Future Appointments   Date Time Provider Department Center   12/9/2024 12:30 PM Johan Locke NP [facility] ORTHO [facility] [address] [facility]   12/20/2024  1:00 PM Leon Ramírez MD [facility] IM [place]   1/6/2025  1:45 PM Johan Locke NP [facility] [address]THO [address]   1/7/2025 10:30 AM LAB, APPOINTMENT [facility] INTMED [facility] LAB IM [facility]   1/8/2025 10:30 AM Johan Locke NP [facility]THO [address]   1/14/2025  1:30 PM Heike Ye NP [facility]   2/20/2025 11:45 AM INJECTION [facility] AMB INF [facility]   2/27/2025  2:15 PM Alen Campa DPM [facility] POD [facility]   4/9/2025  2:00 PM LAB, HEMONC CANCER BLDG [facility] LAB [place]   4/10/2025  2:30 PM Damaris Valadez, MELISSA [facility] HEMONC[facility] [place]        _________________________________  Peace Redd MD  11/29/2024

## 2024-11-29 NOTE — ASSESSMENT & PLAN NOTE
Resolved. Patient had some intercostal pain on 11/27 but resolved on 11/28. EKG and troponin unremarkable. Non cardiac pain.

## 2024-11-29 NOTE — ASSESSMENT & PLAN NOTE
Chronic and controlled. Patient Trelegy Ellipta inhaler at home but not on formulary and hold as inpatient. Follows closely with Pulmonary as outpatient. Duonebs prn in hospital.

## 2024-11-29 NOTE — ASSESSMENT & PLAN NOTE
Resolved on discharge. ADARSH is likely due to pre-renal azotemia due to intravascular volume depletion. Baseline creatinine is  1.0 on admit . Most recent creatinine and eGFR are listed below.  Recent Labs     11/27/24  1148 11/28/24  0451 11/29/24  0534   CREATININE 1.8* 1.3 0.9   EGFRNORACEVR 26.8* 39.6* >60.0        Plan  - ADARSH is resolved after transfused 2 units of PRBCs on 11/27.   - Avoid nephrotoxins and renally dose meds for GFR listed above  - Improved with IVF's and blood transfusion on 11/27.   - Okay to resume home Losartan 100 mg po daily for her HTN on discharge as ADARSH resolved. Losartan held in hospital due to ADARSH.

## 2024-11-29 NOTE — ASSESSMENT & PLAN NOTE
Patients blood pressure range in the last 24 hours was: BP  Min: 98/54  Max: 228/105.The patient's inpatient anti-hypertensive regimen is listed below:  Current Antihypertensives  hydrALAZINE tablet 50 mg, Every 8 hours PRN, Oral    Plan  - BP is controlled, no changes needed to their regimen  - Home Losartan on hold due to ADARSH.

## 2024-11-29 NOTE — ASSESSMENT & PLAN NOTE
Related to ADARSH. Patient placed on sodium bicarbonate tablets to treat and improving. Continue sodium bicarbonate tablets on discharge for 7 days.

## 2024-11-29 NOTE — ASSESSMENT & PLAN NOTE
Chronic and controlled. Patient Trelegy Ellipta inhaler at home but not on formulary and hold as inpatient but resume on discharge. Follows closely with Pulmonary as outpatient.

## 2024-11-29 NOTE — PLAN OF CARE
[facility]  Discharge Final Note    Primary Care Provider: Leon Ramírez MD    Expected Discharge Date: 11/29/2024    Final Discharge Note (most recent)       Final Note - 11/29/24 1156          Final Note    Assessment Type Final Discharge Note     Anticipated Discharge Disposition Rehab Facility     What phone number can be called within the next 1-3 days to see how you are doing after discharge? --   [phone]       Post-Acute Status    Post-Acute Authorization Placement     Post-Acute Placement Status Set-up Complete/Auth obtained                     Important Message from Medicare           Future Appointments   Date Time Provider Department Center   12/9/2024 12:30 PM Johan Locke, NP [facility]THO [address]   12/20/2024  1:00 PM Leon Ramírez MD [facility] IM [place]   1/6/2025  1:45 PM Johan Locke, NP [facility]THO [address] [facility]   1/7/2025 10:30 AM LAB, APPOINTMENT [facility]ED [facility] LAB IM [facility] PCW   1/8/2025 10:30 AM Johan Locke, NP [facility]THO [facility] [address] [facility]   1/14/2025  1:30 PM Heike Ye, GHISLAINE [facility] ENDODIA [facility]   2/20/2025 11:45 AM INJECTION [facility]F [facility]   2/27/2025  2:15 PM Alen Campa, DPM [facility] POD [address] [facility]   4/9/2025  2:00 PM LAB, HEMONC CANCER BLDG [facility] LAB [facility]   4/10/2025  2:30 PM Damaris Valadez, CNS [facility] HEMONC[facility]     Patient discharged to [facility] on 11/29/24.    Alcira Hurtado RNCM  Case Management  [facility]  [phone]

## 2024-11-29 NOTE — ASSESSMENT & PLAN NOTE
Patient has paroxysmal (<7 days) atrial fibrillation. Patient is currently in sinus rhythm. [id number] Score: 4. The patients heart rate in the last 24 hours is as follows:  Pulse  Min: 78  Max: 97     Antiarrhythmics   None    Anticoagulants  None at home    Plan  - Replete lytes with a goal of K>4, Mg >2  - Patient's afib is currently controlled.  - Patient not on long term anticoagulation as outpatient to treat.

## 2024-11-29 NOTE — ASSESSMENT & PLAN NOTE
Patients blood pressure range in the last 24 hours was: BP  Min: 98/54  Max: 228/105.    Plan  - BP is controlled, no changes needed to their regimen  - Home Losartan on hold due to ADARSH.   - Okay to resume home Losartan 100 mg po daily for her HTN on discharge as ADARSH resolved. Losartan held in hospital due to ADARSH.

## 2024-11-29 NOTE — ASSESSMENT & PLAN NOTE
Patient's FSGs are controlled on current medication regimen.   At home was on :  Jardiance 10 mg daily  Lantus 5 units in AM and 5 units in the PM   Novolog 3-5-4 units before meals   Add correction scale if needed.   Blood sugar 180 to 230 add 1 units   Blood sugar 231 to 280 add 2 units   Blood sugar greater than 281 add 3 units     Last A1c reviewed-   Lab Results   Component Value Date    HGBA1C 5.9 (H) 11/25/2024     Most recent fingerstick glucose reviewed-   Recent Labs   Lab 11/27/24  2204 11/28/24  0619 11/28/24  1116 11/28/24  1513   POCTGLUCOSE 227* 131* 216* 202*       Current correctional scale  Medium  Maintain anti-hyperglycemic dose as follows-   Antihyperglycemics (From admission, onward)      Start     Stop Route Frequency Ordered    11/27/24 2100  insulin glargine U-100 (Lantus) pen 5 Units         -- SubQ 2 times daily 11/27/24 1646    11/25/24 1057  insulin aspart U-100 pen 0-10 Units         -- SubQ Before meals & nightly PRN 11/25/24 1000          Hold Oral hypoglycemics while patient is in the hospital.  Monitor blood sugars with meals and at bedtime in hospital.  Target blood sugars 140-180 in hospital.  Diabetic diet.

## 2024-11-29 NOTE — ASSESSMENT & PLAN NOTE
Controlled. Anemia is likely due to acute blood loss which was from surgery and hip fracture and expected blood loss . Most recent hemoglobin and hematocrit are listed below.  Recent Labs     11/27/24  1148 11/28/24  0451   HGB 7.3* 9.5*   HCT 22.9* 28.9*       Plan  - Monitor serial CBC: Daily  - Transfuse PRBC if patient becomes hemodynamically unstable, symptomatic or H/H drops below 7/21.  - Patient has received 2 units of PRBCs on 11/27 for Hgb 7.3.  - Patient's anemia is currently improving on 11/28 after transfusion on 11/27. Hgb 9.5 on 11/28.

## 2024-11-29 NOTE — ASSESSMENT & PLAN NOTE
Present on admit. Vitamin D 28 on admit and started on oral replacement 1000 units po daily and continue on discharge.

## 2024-11-29 NOTE — PROGRESS NOTES
Subjective:      Patient ID: Sussy Costa is a 89 y.o. female.    Chief Complaint:     Patient presents for routine diabetic foot care today.  She is doing well.  She is in need of diabetic education as well.  Still having some arthritic pain bilateral dorsal foot.  Inserts helping.  No other new complaints today.    Review of Systems   Constitutional: Negative for chills, decreased appetite, fever, malaise/fatigue and night sweats.   HENT:  Negative for congestion, ear discharge, hearing loss, nosebleeds and tinnitus.    Eyes:  Negative for double vision, pain and visual disturbance.   Cardiovascular:  Negative for chest pain, claudication, cyanosis and palpitations.   Respiratory:  Negative for cough, hemoptysis, shortness of breath and wheezing.    Endocrine: Negative for cold intolerance and heat intolerance.   Hematologic/Lymphatic: Negative for adenopathy and bleeding problem.   Skin:  Positive for color change, dry skin, nail changes and unusual hair distribution. Negative for flushing and rash.   Musculoskeletal:  Positive for arthritis and stiffness. Negative for joint pain and myalgias.   Gastrointestinal:  Negative for abdominal pain, dysphagia, nausea and vomiting.   Genitourinary:  Negative for dysuria, flank pain, hematuria and pelvic pain.   Neurological:  Positive for disturbances in coordination, paresthesias and sensory change. Negative for loss of balance and numbness.   Psychiatric/Behavioral:  Negative for altered mental status, hallucinations and suicidal ideas. The patient does not have insomnia.    Allergic/Immunologic: Negative for environmental allergies and persistent infections.           Objective:      Physical Exam  Vitals reviewed.   Constitutional:       Appearance: She is well-developed.   HENT:      Head: Normocephalic and atraumatic.   Cardiovascular:      Pulses:           Dorsalis pedis pulses are 2+ on the right side and 2+ on the left side.        Posterior tibial pulses  are 2+ on the right side and 2+ on the left side.      Comments: No edema noted to b/L LEs  Pulmonary:      Effort: Pulmonary effort is normal.   Musculoskeletal:         General: Normal range of motion.      Comments: Muscle strength is 5/5 in all groups bilaterally.  Non reducible equinus noted to left lower extremity  POP to insertion of the AT, left    Positive Tinel sign/provocation sign bilateral tibial nerve.   Feet:      Right foot:      Protective Sensation: 5 sites tested.  5 sites sensed.      Skin integrity: Callus and dry skin present.      Left foot:      Protective Sensation: 5 sites tested.  5 sites sensed.      Skin integrity: Callus and dry skin present.   Skin:     General: Skin is warm and dry.      Capillary Refill: Capillary refill takes 2 to 3 seconds.      Coloration: Skin is pale.      Comments: Long, thickened, discolored  toenails 1-5 with subungual debris.  Skin dry thin and atrophic.   Neurological:      Mental Status: She is alert and oriented to person, place, and time.      Sensory: Sensory deficit present.      Motor: Atrophy present.      Deep Tendon Reflexes: Reflexes abnormal.      Reflex Scores:       Patellar reflexes are 1+ on the right side and 1+ on the left side.       Achilles reflexes are 1+ on the right side and 1+ on the left side.     Comments: Intact gross sensation noted to b/L LEs   Psychiatric:         Behavior: Behavior normal.               Assessment:       Encounter Diagnoses   Name Primary?    Type II diabetes mellitus with neurological manifestations Yes    Onychomycosis due to dermatophyte     Corn or callus            Plan:       Sussy was seen today for toe pain, diabetes mellitus and nail care.    Diagnoses and all orders for this visit:    Type II diabetes mellitus with neurological manifestations    Onychomycosis due to dermatophyte    Corn or callus        I counseled the patient on her conditions, their implications and medical  management.    Decision making:  Chronic illnesses discussed in detail, previous records/notes and imaging independently reviewed, prescription drug management performed in addition to lengthy discussion regarding both conservative and surgical treatment options.    Discussed options for peripheral neuropathy/nerve entrapment syndrome including nerve block therapy, surgical nerve entrapment decompression procedures, and various vitamins and supplementation available shown to improve nerve function.    Routine Foot Care    Performed by:  Alen Campa. DPM  Authorized by:  Patient     Consent Done?:  Yes (Verbal)     Nail Care Type:  Debride  Location(s): All  (Left 1st Toe, Left 3rd Toe, Left 2nd Toe, Left 4th Toe, Left 5th Toe, Right 1st Toe, Right 2nd Toe, Right 3rd Toe, Right 4th Toe and Right 5th Toe)  Patient tolerance:  Patient tolerated the procedure well with no immediate complications     With patient's permission, the toenails mentioned above were aggressively reduced and debrided using a nail nipper, removing all offending nail and debris. The patient will continue to monitor the areas daily, inspect the feet, wear protective shoe gear when ambulatory, and moisturizer to maintain skin integrity.      Callus Care Type: Debride    With patient's permission, the calluses/hyperkeratotic lesions mentioned above were aggressively reduced and debrided using a number 15 blade. The patient will continue to monitor the areas daily, inspect the feet, wear protective shoe gear when ambulatory, and moisturizer to maintain skin integrity.       Shoe inspection. Diabetic Foot Education. Patient reminded of the importance of good nutrition and blood sugar control to help prevent podiatric complications of diabetes. Patient instructed on proper foot hygeine. We discussed wearing proper shoe gear, daily foot inspections and Diabetic foot education in detail.    Return to clinic in 3-6 months or sooner if problems arise          .

## 2024-11-29 NOTE — ASSESSMENT & PLAN NOTE
Related to ADARSH. Patient placed on sodium bicarbonate tablets to treat and improving. Continue sodium bicarbonate tablets. Monitor HCO3 on daily BMP.

## 2024-11-29 NOTE — ASSESSMENT & PLAN NOTE
Patient with right arm laceration on admit and (5) sutures placed in ER and covered on admit, due in 7-10 days for removal on 12/3. Continue local wound care to right arm and added to Rehab orders. Suture removal can be done at [facility] on 12/3 to right forearm.

## 2024-11-29 NOTE — SUBJECTIVE & OBJECTIVE
Interval History: Patient reports pain to right hip 4/10 today on rounds and states pain is improving. Patient eating and drinking well. Patient in good spirits and working with therapy. Labs reviewed. Creatinine improved to 1.3 today from 1.8 yesterday and Hgb improved to 9.5 today from 7.3 yesterday after transfused 2 units of PRBCs yesterday. Platelets stable at 140,000 and 134,000 yesterday. Blood sugars better controlled after insulin adjustments made yesterday.     Review of Systems   Constitutional:  Negative for fever.   Respiratory:  Negative for cough and shortness of breath.    Cardiovascular:  Negative for chest pain.   Gastrointestinal:  Negative for nausea.   Musculoskeletal:  Positive for arthralgias (Right hip) and myalgias (Right thigh).   Neurological:  Negative for dizziness.   Psychiatric/Behavioral:  Negative for agitation and confusion.      Objective:     Vital Signs (Most Recent):  Temp: 98.2 °F (36.8 °C) (11/28/24 1520)  Pulse: 74 (11/28/24 1520)  Resp: 18 (11/28/24 1520)  BP: 154/69 (11/28/24 1520)  SpO2: 95 % (11/28/24 1520) on room air Vital Signs (24h Range):  Temp:  [97.1 °F (36.2 °C)-98.7 °F (37.1 °C)] 98.2 °F (36.8 °C)  Pulse:  [66-94] 74  Resp:  [15-18] 18  SpO2:  [93 %-97 %] 95 %  BP: (136-156)/() 154/69     Weight: 53 kg (116 lb 13.5 oz)  Body mass index is 19.44 kg/m².    Intake/Output Summary (Last 24 hours) at 11/28/2024 1820  Last data filed at 11/28/2024 0713  Gross per 24 hour   Intake 461.67 ml   Output 1100 ml   Net -638.33 ml         Physical Exam  Vitals and nursing note reviewed.   Constitutional:       General: She is not in acute distress.     Appearance: She is well-developed.   Eyes:      Conjunctiva/sclera: Conjunctivae normal.   Cardiovascular:      Rate and Rhythm: Normal rate and regular rhythm.      Heart sounds: Normal heart sounds. No murmur heard.     No friction rub. No gallop.   Pulmonary:      Effort: Pulmonary effort is normal. No respiratory  distress.      Breath sounds: Normal breath sounds. No wheezing.   Abdominal:      General: Bowel sounds are normal. There is no distension.      Palpations: Abdomen is soft.      Tenderness: There is no abdominal tenderness. There is no guarding.   Musculoskeletal:      Right lower leg: No edema.      Left lower leg: No edema.   Skin:     General: Skin is warm.      Findings: No erythema.      Comments: Surgical dressings noted on right hip and thigh areas. Dressings are clean and dry and intact.   Neurological:      Mental Status: She is alert and oriented to person, place, and time.   Psychiatric:         Mood and Affect: Mood normal.         Behavior: Behavior normal.         Thought Content: Thought content normal.         Judgment: Judgment normal.             Significant Labs: CBC:   Recent Labs   Lab 11/27/24  0456 11/27/24  1148 11/28/24  0451   WBC 10.70 13.36* 12.47   HGB 5.8* 7.3* 9.5*   HCT 18.5* 22.9* 28.9*   * 134* 140*     CMP:   Recent Labs   Lab 11/27/24  0456 11/27/24  1148 11/28/24  0451    137 141   K 4.2 4.1 4.6    109 113*   CO2 20* 20* 21*   * 165* 113*   BUN 49* 48* 43*   CREATININE 2.1* 1.8* 1.3   CALCIUM 7.3* 7.5* 8.2*   ANIONGAP 8 8 7*     Magnesium:   Recent Labs   Lab 11/27/24  0456 11/28/24  0451   MG 1.8 1.9     POCT Glucose:   Recent Labs   Lab 11/28/24  0619 11/28/24  1116 11/28/24  1513   POCTGLUCOSE 131* 216* 202*       Significant Imaging: I have reviewed all pertinent imaging results/findings within the past 24 hours.

## 2024-11-29 NOTE — PROGRESS NOTES
[facility]  Progress Note    Patient Name: Sussy Costa  MRN: [id number]  Patient Class: IP- Inpatient   Admission Date: 11/25/2024  Length of Stay: 3 days  Attending Physician: Peace Redd MD  Primary Care Provider: Leon Ramírez MD        Subjective:     Principal Problem:Closed displaced intertrochanteric fracture of right femur        HPI:  Ms. Sussy Costa is an 89-year-old female w/ a h/o left intertrochanteric fracture s/p surgery 04/2024, Type 2 DM, CKD stage 3, HTN, pancreatic insufficiency, Asthma, Afib that presents for evaluation of hip pain. She was getting up to walk to the bathroom this morning an hour prior to arrival, took 2 steps and then felt herself falling. She fell onto her right side and attempted to grab her dresser and scraped her arm. She is unsure if she hit her head or had a syncopal event before falling. She denies any preceding chest pain, palpitations, lightheadedness or dizziness. She reports 8/10 right-sided hip pain that extends to mid thigh. She reports minimal blood loss from her arm. Bleeding was controlled with a dressing. She has not eaten since dinner at 6:00 pm yesterday. Sip of water at 11:00 p.m. yesterday for meds. She denied any chest pain, shortness of breath, abdominal pain. She did not take her medications this morning.     In ED, Pt hypertensive 228/105, otherwise AFVSS on RA. WBC 18, hgb 11.2. K 3.3. . Trop 0.022. XR b/l hips: Acute fracture of the right hip. CTH: No acute intracranial hemorrhage. CT C spine: Broad central disc herniation with stable extrusion and superior migration fine the C5 vertebral body flattens the traversing cord with presumably some encroachment on the exiting right C6 nerve root. No acute cervical fracture. Given IV morphine, IV antiemetics. RUE lac repaired.     Overview/Hospital Course:  Patient admitted to [facility] Medicine Team H: Hip Fracture team and started on Hip Fracture Pathway with  Orthopedic surgery consult for closed displaced right intertrochanteric femur fracture. Patient was seen and evaluated by Orthopedic surgery who recommended operative repair of hip fracture. Patient was medically optimized prior to surgery and was taken to OR after optimization on 11/25/2024. Patient underwent right hip cephalomedullary nail fixation by Dr. Lorenzo Blunt. Post-op patient weight bear as tolerated to the right lower extremity as per Orthopedics recommendation. Patient placed on Apixiban 2.5 mg po BID and DEANNA/SCD's for DVT prophylaxis post-op and will need for total of 30 days. Perineural pain catheter placed by Anesthesia Pain Service with continuous infusion of Ropivacaine to help with pain control post-op and Anesthesia Pain Service managing while patient in the hospital. Patient placed on multimodal pain management post-op with Tylenol 1000 mg po every 8 hours and Robaxin 500 mg po 4 times daily prn for breakthrough pain post-op and will continue. PT/OT consulted post-op and evaluated patient. Patient progressing well with PT and OT and recommended  high intensity  when medically ready for hospital discharge. Rehab referral sent and patient accepted to [facility] when medically ready. Patient post-op noted ot have decrease in Hgb down to 7.3 on 11/27 and worsening Creatinine consistent with ADARSH. Creatinine 1.0 on admit but increased to 1.8 on 11/26 and 1.9 on 11/27. ADARSH felt related to volume depletion and with worsening anemia felt would benefit from blood transfusion so transfused 2 units of PRBCs on 11/27. Patient with no clinical sign of active bleeding. Worsening anemia felt related to expected blood loss from hip fracture and surgery. Creatinine improved to 1.3 on 11/28 and Hgb improved to 9.5 on 11/28 after transfused 2 units of PRBCs on 11/27. Platelets stable at 140,000.      Interval History: Patient reports pain to right hip 4/10 today on rounds and states pain is improving.  Patient eating and drinking well. Patient in good spirits and working with therapy. Labs reviewed. Creatinine improved to 1.3 today from 1.8 yesterday and Hgb improved to 9.5 today from 7.3 yesterday after transfused 2 units of PRBCs yesterday. Platelets stable at 140,000 and 134,000 yesterday. Blood sugars better controlled after insulin adjustments made yesterday.     Review of Systems   Constitutional:  Negative for fever.   Respiratory:  Negative for cough and shortness of breath.    Cardiovascular:  Negative for chest pain.   Gastrointestinal:  Negative for nausea.   Musculoskeletal:  Positive for arthralgias (Right hip) and myalgias (Right thigh).   Neurological:  Negative for dizziness.   Psychiatric/Behavioral:  Negative for agitation and confusion.      Objective:     Vital Signs (Most Recent):  Temp: 98.2 °F (36.8 °C) (11/28/24 1520)  Pulse: 74 (11/28/24 1520)  Resp: 18 (11/28/24 1520)  BP: 154/69 (11/28/24 1520)  SpO2: 95 % (11/28/24 1520) on room air Vital Signs (24h Range):  Temp:  [97.1 °F (36.2 °C)-98.7 °F (37.1 °C)] 98.2 °F (36.8 °C)  Pulse:  [66-94] 74  Resp:  [15-18] 18  SpO2:  [93 %-97 %] 95 %  BP: (136-156)/() 154/69     Weight: 53 kg (116 lb 13.5 oz)  Body mass index is 19.44 kg/m².    Intake/Output Summary (Last 24 hours) at 11/28/2024 1820  Last data filed at 11/28/2024 0713  Gross per 24 hour   Intake 461.67 ml   Output 1100 ml   Net -638.33 ml         Physical Exam  Vitals and nursing note reviewed.   Constitutional:       General: She is not in acute distress.     Appearance: She is well-developed.   Eyes:      Conjunctiva/sclera: Conjunctivae normal.   Cardiovascular:      Rate and Rhythm: Normal rate and regular rhythm.      Heart sounds: Normal heart sounds. No murmur heard.     No friction rub. No gallop.   Pulmonary:      Effort: Pulmonary effort is normal. No respiratory distress.      Breath sounds: Normal breath sounds. No wheezing.   Abdominal:      General: Bowel sounds are  normal. There is no distension.      Palpations: Abdomen is soft.      Tenderness: There is no abdominal tenderness. There is no guarding.   Musculoskeletal:      Right lower leg: No edema.      Left lower leg: No edema.   Skin:     General: Skin is warm.      Findings: No erythema.      Comments: Surgical dressings noted on right hip and thigh areas. Dressings are clean and dry and intact.   Neurological:      Mental Status: She is alert and oriented to person, place, and time.   Psychiatric:         Mood and Affect: Mood normal.         Behavior: Behavior normal.         Thought Content: Thought content normal.         Judgment: Judgment normal.             Significant Labs: CBC:   Recent Labs   Lab 11/27/24  0456 11/27/24  1148 11/28/24  0451   WBC 10.70 13.36* 12.47   HGB 5.8* 7.3* 9.5*   HCT 18.5* 22.9* 28.9*   * 134* 140*     CMP:   Recent Labs   Lab 11/27/24  0456 11/27/24  1148 11/28/24  0451    137 141   K 4.2 4.1 4.6    109 113*   CO2 20* 20* 21*   * 165* 113*   BUN 49* 48* 43*   CREATININE 2.1* 1.8* 1.3   CALCIUM 7.3* 7.5* 8.2*   ANIONGAP 8 8 7*     Magnesium:   Recent Labs   Lab 11/27/24  0456 11/28/24  0451   MG 1.8 1.9     POCT Glucose:   Recent Labs   Lab 11/28/24  0619 11/28/24  1116 11/28/24  1513   POCTGLUCOSE 131* 216* 202*       Significant Imaging: I have reviewed all pertinent imaging results/findings within the past 24 hours.    Assessment/Plan:      * Closed displaced intertrochanteric fracture of right femur s/p IM nail on 11/25/2024  Age related osteoporosis with current pathological fracture   Patient with mechanical fall at home in bathroom and sustained closed right intertrochanteric fracture related to her known osteoporosis.  - Ortho consulted and patient taken to OR on 11/25/2024 with Dr. Lorenzo Blunt and underwent IM nail of right femur to repair fracture.   - Post-op, patient is weight bear as tolerated to right lower extremity.   - Patient started  on Apixiban 2.5 mg po BID post-op for DVT prophylaxis and plan 30 days with end date 12/25/2024.  - PT/OT consulted and recommended high intensity and rehab referrals sent and patient accepted to [facility] when medically ready.   - Surgical bandages to remain in place to right hip and thigh area until Orthopedic clinic follow-up.   - Orthopedic managing surgical site.   - Pain controlled. Continue multimodals with scheduled Tylenol and Robaxin for pain.      ADARSH (acute kidney injury)  ADARSH is likely due to pre-renal azotemia due to intravascular volume depletion. Baseline creatinine is  1.0 on admit . Most recent creatinine and eGFR are listed below.  Recent Labs     11/27/24  0456 11/27/24  1148 11/28/24  0451   CREATININE 2.1* 1.8* 1.3   EGFRNORACEVR 22.2* 26.8* 39.6*        Plan  - ADARSH is improving after transfused 2 units of PRBCs on 11/27.   - Avoid nephrotoxins and renally dose meds for GFR listed above  - Monitor urine output, serial BMP, and adjust therapy as needed  - Improved with IVF's and blood transfusion on 11/27.     Hypophosphatemia  Patient's most recent phosphorus results are listed below.   Recent Labs     11/26/24  0554 11/27/24  0456 11/28/24  0451   PHOS 4.7* 4.2 2.3*     Plan  - Will treat hypophosphatemia with oral Neutra Phos.   - Monitor daily phosphorus level.    Intercostal pain  Patient had some intercostal pain on 11/27 but resolved on 11/28. EKG and troponin unremarkable. Non cardiac pain.       Metabolic acidosis  Related to ADARSH. Patient placed on sodium bicarbonate tablets to treat and improving. Continue sodium bicarbonate tablets. Monitor HCO3 on daily BMP.     Acute blood loss anemia  Anemia is likely due to acute blood loss which was from surgery and hip fracture and expected blood loss . Most recent hemoglobin and hematocrit are listed below.  Recent Labs     11/27/24  1148 11/28/24  0451   HGB 7.3* 9.5*   HCT 22.9* 28.9*       Plan  - Monitor serial CBC: Daily  -  Transfuse PRBC if patient becomes hemodynamically unstable, symptomatic or H/H drops below 7/21.  - Patient has received 2 units of PRBCs on 11/27 for Hgb 7.3.  - Patient's anemia is currently improving on 11/28 after transfusion on 11/27. Hgb 9.5 on 11/28.     Laceration of right forearm  Patient with right arm laceration on admit and (5) sutures placed in ER and covered on admit, due in 7-10 days for removal around 12/3.       Abnormal computed tomography of cervical spine  - CT C spine: No acute cervical fracture. 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 No symptoms but if develops would refer to outpatient Neurosurgery clinic.      Primary hypertension  Patients blood pressure range in the last 24 hours was: BP  Min: 98/54  Max: 228/105.The patient's inpatient anti-hypertensive regimen is listed below:  Current Antihypertensives  hydrALAZINE tablet 50 mg, Every 8 hours PRN, Oral    Plan  - BP is controlled, no changes needed to their regimen  - Home Losartan on hold due to ADARSH.       Vitamin D insufficiency  Present on admit. Vitamin D 28 on admit and started on oral replacement 1000 units po daily and continue on discharge.     Paroxysmal atrial fibrillation  Patient has paroxysmal (<7 days) atrial fibrillation. Patient is currently in sinus rhythm. [id number] Score: 4. The patients heart rate in the last 24 hours is as follows:  Pulse  Min: 66  Max: 94     Antiarrhythmics   None    Anticoagulants  None at home    Plan  - Replete lytes with a goal of K>4, Mg >2  - Patient's afib is currently controlled.  - Patient not on long term anticoagulation as outpatient to treat.    Type 2 diabetes mellitus with stage 3a chronic kidney disease, with long-term current use of insulin  Patient's FSGs are controlled on current medication regimen.   At home was on  :  Jardiance 10 mg daily  Lantus 5 units in AM and 5 units in the PM   Novolog 3-5-4 units before meals   Add correction scale if needed.   Blood sugar 180 to 230 add 1 units   Blood sugar 231 to 280 add 2 units   Blood sugar greater than 281 add 3 units     Last A1c reviewed-   Lab Results   Component Value Date    HGBA1C 5.9 (H) 11/25/2024     Most recent fingerstick glucose reviewed-   Recent Labs   Lab 11/27/24  2204 11/28/24  0619 11/28/24  1116 11/28/24  1513   POCTGLUCOSE 227* 131* 216* 202*       Current correctional scale  Medium  Maintain anti-hyperglycemic dose as follows-   Antihyperglycemics (From admission, onward)      Start     Stop Route Frequency Ordered    11/27/24 2100  insulin glargine U-100 (Lantus) pen 5 Units         -- SubQ 2 times daily 11/27/24 1646    11/25/24 1057  insulin aspart U-100 pen 0-10 Units         -- SubQ Before meals & nightly PRN 11/25/24 1000          Hold Oral hypoglycemics while patient is in the hospital.  Monitor blood sugars with meals and at bedtime in hospital.  Target blood sugars 140-180 in hospital.  Diabetic diet.     Moderate persistent asthma without complication  Chronic and controlled. Patient Trelegy Ellipta inhaler at home but not on formulary and hold as inpatient. Follows closely with Pulmonary as outpatient. Duonebs prn in hospital.     Pancreatic insufficiency  Chronic condition. Continue home Creon po TID with meals to treat.     Primary open angle glaucoma (POAG) of both eyes, mild stage  Chronic and controlled. Continue home Latanoprost eye drops to treat.     VTE Risk Mitigation (From admission, onward)           Ordered     apixaban tablet 2.5 mg  2 times daily         11/25/24 1933     Place sequential compression device  Until discontinued         11/25/24 1626     Place sequential compression device  Until discontinued         11/25/24 1000                    Discharge Planning   ANTOINE: 11/29/2024     Code Status: Full Code   Is the patient  medically ready for discharge?:     Reason for patient still in hospital (select all that apply): Patient trending condition  Discharge Plan A: Rehab ([facility])         Peace Redd MD  Department of Hospital Medicine   [facility]

## 2024-11-29 NOTE — ASSESSMENT & PLAN NOTE
Patient has paroxysmal (<7 days) atrial fibrillation. Patient is currently in sinus rhythm. LOYHB4SMLp Score: 4. The patients heart rate in the last 24 hours is as follows:  Pulse  Min: 66  Max: 94     Antiarrhythmics   None    Anticoagulants  None at home    Plan  - Replete lytes with a goal of K>4, Mg >2  - Patient's afib is currently controlled.  - Patient not on long term anticoagulation as outpatient to treat.

## 2024-11-29 NOTE — ASSESSMENT & PLAN NOTE
Patient had some intercostal pain on 11/27 but resolved on 11/28. EKG and troponin unremarkable. Non cardiac pain.

## 2024-11-29 NOTE — ASSESSMENT & PLAN NOTE
Anemia is likely due to acute blood loss which was from surgery and hip fracture and expected blood loss . Most recent hemoglobin and hematocrit are listed below.  Recent Labs     11/27/24  1148 11/28/24  0451   HGB 7.3* 9.5*   HCT 22.9* 28.9*       Plan  - Monitor serial CBC: Daily  - Transfuse PRBC if patient becomes hemodynamically unstable, symptomatic or H/H drops below 7/21.  - Patient has received 2 units of PRBCs on 11/27 for Hgb 7.3.  - Patient's anemia is currently improving on 11/28 after transfusion on 11/27. Hgb 9.5 on 11/28.

## 2024-11-29 NOTE — ASSESSMENT & PLAN NOTE
Patient with right arm laceration on admit and (5) sutures placed in ER and covered on admit, due in 7-10 days for removal around 12/3.

## 2024-12-02 ENCOUNTER — OUTPATIENT CASE MANAGEMENT (OUTPATIENT)
Dept: ADMINISTRATIVE | Facility: OTHER | Age: 89
End: 2024-12-02
Payer: MEDICARE

## 2024-12-08 ENCOUNTER — HOSPITAL ENCOUNTER (EMERGENCY)
Facility: HOSPITAL | Age: 89
Discharge: HOME | End: 2024-12-08
Attending: STUDENT IN AN ORGANIZED HEALTH CARE EDUCATION/TRAINING PROGRAM
Payer: MEDICARE

## 2024-12-08 VITALS
TEMPERATURE: 98 F | WEIGHT: 115 LBS | RESPIRATION RATE: 15 BRPM | SYSTOLIC BLOOD PRESSURE: 181 MMHG | OXYGEN SATURATION: 98 % | HEIGHT: 65 IN | DIASTOLIC BLOOD PRESSURE: 77 MMHG | HEART RATE: 72 BPM | BODY MASS INDEX: 19.16 KG/M2

## 2024-12-08 DIAGNOSIS — R09.89 SUSPECTED DEEP VEIN THROMBOSIS (DVT): ICD-10-CM

## 2024-12-08 DIAGNOSIS — R52 PAIN: ICD-10-CM

## 2024-12-08 LAB
ALBUMIN SERPL BCP-MCNC: 2.7 G/DL (ref 3.5–5.2)
ALP SERPL-CCNC: 78 U/L (ref 40–150)
ALT SERPL W/O P-5'-P-CCNC: 14 U/L (ref 10–44)
ANION GAP SERPL CALC-SCNC: 7 MMOL/L (ref 8–16)
AST SERPL-CCNC: 20 U/L (ref 10–40)
BASOPHILS # BLD AUTO: 0.07 K/UL (ref 0–0.2)
BASOPHILS NFR BLD: 0.7 % (ref 0–1.9)
BILIRUB SERPL-MCNC: 0.6 MG/DL (ref 0.1–1)
BUN SERPL-MCNC: 16 MG/DL (ref 8–23)
CALCIUM SERPL-MCNC: 8.5 MG/DL (ref 8.7–10.5)
CHLORIDE SERPL-SCNC: 108 MMOL/L (ref 95–110)
CO2 SERPL-SCNC: 21 MMOL/L (ref 23–29)
CREAT SERPL-MCNC: 1 MG/DL (ref 0.5–1.4)
DIFFERENTIAL METHOD BLD: ABNORMAL
EOSINOPHIL # BLD AUTO: 1 K/UL (ref 0–0.5)
EOSINOPHIL NFR BLD: 10.5 % (ref 0–8)
ERYTHROCYTE [DISTWIDTH] IN BLOOD BY AUTOMATED COUNT: 15.8 % (ref 11.5–14.5)
EST. GFR  (NO RACE VARIABLE): 54.2 ML/MIN/1.73 M^2
GLUCOSE SERPL-MCNC: 112 MG/DL (ref 70–110)
HCT VFR BLD AUTO: 27.2 % (ref 37–48.5)
HGB BLD-MCNC: 8.5 G/DL (ref 12–16)
IMM GRANULOCYTES # BLD AUTO: 0.04 K/UL (ref 0–0.04)
IMM GRANULOCYTES NFR BLD AUTO: 0.4 % (ref 0–0.5)
LYMPHOCYTES # BLD AUTO: 1.4 K/UL (ref 1–4.8)
LYMPHOCYTES NFR BLD: 14.2 % (ref 18–48)
MCH RBC QN AUTO: 30 PG (ref 27–31)
MCHC RBC AUTO-ENTMCNC: 31.3 G/DL (ref 32–36)
MCV RBC AUTO: 96 FL (ref 82–98)
MONOCYTES # BLD AUTO: 0.7 K/UL (ref 0.3–1)
MONOCYTES NFR BLD: 7.4 % (ref 4–15)
NEUTROPHILS # BLD AUTO: 6.5 K/UL (ref 1.8–7.7)
NEUTROPHILS NFR BLD: 66.8 % (ref 38–73)
NRBC BLD-RTO: 0 /100 WBC
PLATELET # BLD AUTO: 501 K/UL (ref 150–450)
PMV BLD AUTO: 10.8 FL (ref 9.2–12.9)
POTASSIUM SERPL-SCNC: 4.5 MMOL/L (ref 3.5–5.1)
PROT SERPL-MCNC: 5.5 G/DL (ref 6–8.4)
RBC # BLD AUTO: 2.83 M/UL (ref 4–5.4)
SODIUM SERPL-SCNC: 136 MMOL/L (ref 136–145)
WBC # BLD AUTO: 9.79 K/UL (ref 3.9–12.7)

## 2024-12-08 PROCEDURE — 25000003 PHARM REV CODE 250

## 2024-12-08 PROCEDURE — 80053 COMPREHEN METABOLIC PANEL: CPT

## 2024-12-08 PROCEDURE — 85025 COMPLETE CBC W/AUTO DIFF WBC: CPT

## 2024-12-08 PROCEDURE — 93005 ELECTROCARDIOGRAM TRACING: CPT

## 2024-12-08 PROCEDURE — 99285 EMERGENCY DEPT VISIT HI MDM: CPT | Mod: 25

## 2024-12-08 PROCEDURE — 93010 ELECTROCARDIOGRAM REPORT: CPT | Mod: ,,, | Performed by: INTERNAL MEDICINE

## 2024-12-08 RX ORDER — OXYCODONE AND ACETAMINOPHEN 5; 325 MG/1; MG/1
1 TABLET ORAL
Status: COMPLETED | OUTPATIENT
Start: 2024-12-08 | End: 2024-12-08

## 2024-12-08 RX ADMIN — OXYCODONE HYDROCHLORIDE AND ACETAMINOPHEN 1 TABLET: 5; 325 TABLET ORAL at 08:12

## 2024-12-08 NOTE — ED NOTES
Sussy Costa, an 89 y.o. female presents to the ED via EMS from [facility] where she is after she broke her R hip. Endorses R lower extremity swelling and 4/10 pain. The R lower extremity is swollen, sensation intact, pulses intact.       Chief Complaint   Patient presents with    Leg Pain     Arrives via EMS coming from [facility]. Had a femur fracture and surgery around 11/24-11/25. Patient endorses right leg swelling and pain that started last night. Palpable pedal pulse noted.      Review of patient's allergies indicates:   Allergen Reactions    Aspirin Other (See Comments)     Asthma    TRIAD OF NASAL POLYPS, ASA  ALLERGY AND ASTHMA.        Aspirin Other (See Comments)    Nsaids (non-steroidal anti-inflammatory drug) Other (See Comments)     AVOID DUE TO TRIAD OF ASA ALLERGY, NASAL POLYPS,AND ASTHMA  AVOID DUE TO TRIAD OF ASA ALLERGY, NASAL POLYPS,AND ASTHMA    Penicillin      Other reaction(s): Rash    9/30/20: tolerates ceftriaxone    Penicillin g Other (See Comments)     Other reaction(s): Rash    9/30/20: tolerates ceftriaxone     Past Medical History:   Diagnosis Date    Asthma     Cyst of pancreas     liver    Diabetes mellitus type II     Eczema of hand     Glaucoma     History of colon cancer 10/28/2022    Right sided colon cancer diagnosed in setting of LBO requiring urgent hemicolectomy 10/25/2022 with path showing pT4aN0 disease. CT chest 12/7/22 with possible lung metastases and MR liver with indeterminate liver lesions. 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 Patient elected for surveillance.       History of recurrent pneumonia 09/28/2020    Hyperlipidemia     Hypertension     Lichen planus     gums    Osteoporosis     Other chronic pancreatitis 03/01/2023    Pancreas cyst 03/18/2016    Pulmonary nodules     Spinal stenosis of lumbar region 08/30/2018

## 2024-12-08 NOTE — ED PROVIDER NOTES
Encounter Date: 12/8/2024       History     Chief Complaint   Patient presents with    Leg Pain     Arrives via EMS coming from [facility]. Had a femur fracture and surgery around 11/24-11/25. Patient endorses right leg swelling and pain that started last night. Palpable pedal pulse noted.      HPI  89-year-old female history of right femur fracture on 11/26/2024, presents today for right lower extremity pain and swelling.  She states she felt like the leg has changed in size acutely today, able to feel, pain is 4/10, the pain is worse in the catheterization.  She has no history of DVTs or blood clots.  Not on hormone replacement, not on birth control, she is on Eliquis 2.5 mg daily.  She is compliant with her medication.    She states that she had her leg elevated was watching the game in her leg started swelling a little bit.   Review of patient's allergies indicates:   Allergen Reactions    Aspirin Other (See Comments)     Asthma    TRIAD OF NASAL POLYPS, ASA  ALLERGY AND ASTHMA.        Aspirin Other (See Comments)    Nsaids (non-steroidal anti-inflammatory drug) Other (See Comments)     AVOID DUE TO TRIAD OF ASA ALLERGY, NASAL POLYPS,AND ASTHMA  AVOID DUE TO TRIAD OF ASA ALLERGY, NASAL POLYPS,AND ASTHMA    Penicillin      Other reaction(s): Rash    9/30/20: tolerates ceftriaxone    Penicillin g Other (See Comments)     Other reaction(s): Rash    9/30/20: tolerates ceftriaxone     Past Medical History:   Diagnosis Date    Asthma     Cyst of pancreas     liver    Diabetes mellitus type II     Eczema of hand     Glaucoma     History of colon cancer 10/28/2022    Right sided colon cancer diagnosed in setting of LBO requiring urgent hemicolectomy 10/25/2022 with path showing pT4aN0 disease. CT chest 12/7/22 with possible lung metastases and MR liver with indeterminate liver lesions. 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  Patient elected for surveillance.       History of recurrent pneumonia 09/28/2020    Hyperlipidemia     Hypertension     Lichen planus     gums    Osteoporosis     Other chronic pancreatitis 03/01/2023    Pancreas cyst 03/18/2016    Pulmonary nodules     Spinal stenosis of lumbar region 08/30/2018     Past Surgical History:   Procedure Laterality Date    BRONCHOSCOPY N/A 05/13/2022    Procedure: Bronchoscopy;  Surgeon: Tina Diagnostic Provider;  [facility];  Service: Anesthesiology;  Laterality: N/A;    CATARACT EXTRACTION, BILATERAL      CHOLECYSTECTOMY      COLECTOMY, RIGHT Right 10/25/2022    Procedure: COLECTOMY, RIGHT;  Surgeon: Jass Holman MD;  [facility];  Service: Colon and Rectal;  Laterality: Right;    COLONOSCOPY N/A 06/26/2023    Procedure: COLONOSCOPY;  Surgeon: Jass Holman MD;  [facility]  Service: Endoscopy;  Laterality: N/A;  2nd floor -lung disease  referral Dr [id number] portal/mailed-GT  6/20/23- Prec[place] confirmed- [place]    ENDOSCOPIC ULTRASOUND OF UPPER GASTROINTESTINAL TRACT N/A 04/22/2022    Procedure: ULTRASOUND, UPPER GI TRACT, ENDOSCOPIC;  Surgeon: Max Rosado MD;  [facility]  Service: Endoscopy;  Laterality: N/A;  urgent EUS (linear) for abnormal CT scan with dilated PD and increased cyst of pancreas cyst.  pap 44 mmHg  4/13:fully vaccinated. instructions via portal-SC    EPIDURAL STEROID INJECTION INTO LUMBAR SPINE N/A 11/08/2018    Procedure: Injection-steroid-epidural-lumbar L5-S1;  Surgeon: Nicci Osorio Jr., MD;  Location: [facility];  Service: Pain Management;  Laterality: N/A;    FUNCTIONAL ENDOSCOPIC SINUS SURGERY (FESS) USING COMPUTER-ASSISTED NAVIGATION N/A 06/17/2019    Procedure: FESS, USING COMPUTER-ASSISTED NAVIGATION;  Surgeon: Rosangela Isabel MD;  Location: [facility] OR;  Service: ENT;  Laterality: N/A;  video    HIP SURGERY Left 04/2024    HYSTERECTOMY       INTRAMEDULLARY RODDING OF FEMUR Left 03/31/2024    Procedure: INSERTION, INTRAMEDULLARY CAYDEN, FEMUR - Left,;  Surgeon: Nakul Walker MD;  Location: [facility] OR [address] [place] [address];  Service: Orthopedics;  Laterality: Left;    INTRAMEDULLARY RODDING OF FEMUR Right 11/25/2024    Procedure: INSERTION, INTRAMEDULLARY CAYDEN, FEMUR;  Surgeon: Lorenzo Blunt MD;  Location: [facility] OR [address] [place] [address];  Service: Orthopedics;  Laterality: Right;    nasal polyps       Family History   Problem Relation Name Age of Onset    Heart disease Father      Heart disease Brother      Hypertension Brother       Social History     Tobacco Use    Smoking status: Never     Passive exposure: Never    Smokeless tobacco: Never   Substance Use Topics    Alcohol use: Yes     Comment: socially    Drug use: No     Review of Systems    Physical Exam     Initial Vitals [12/08/24 1428]   BP Pulse Resp Temp SpO2   (!) 166/67 83 16 98.1 °F (36.7 °C) 99 %      MAP       --         Physical Exam    Nursing note and vitals reviewed.  Constitutional: She appears well-developed and well-nourished.   Neck: No tracheal deviation present. No JVD present.   Cardiovascular:  Normal heart sounds and intact distal pulses.     Exam reveals no friction rub.       No murmur heard.  Pulmonary/Chest: No stridor. She has no wheezes. She has no rhonchi. She has no rales.   Abdominal: Abdomen is soft. There is no abdominal tenderness. There is no rebound and no guarding.   Musculoskeletal:      Comments: Right leg larger than left, good distal pulses DP and posterior tibial, patient able to bend knee 30°.  No obvious deformity, able to wiggle all toes, sensation intact.     Skin: Capillary refill takes less than 2 seconds.         ED Course   Procedures  Labs Reviewed   CBC W/ AUTO DIFFERENTIAL   COMPREHENSIVE METABOLIC PANEL          Imaging Results    None          Medications - No data to display  Medical Decision Making  89-year-old female coming in for right lower leg swelling and  tenderness.    Differential diagnosis in this patient includes but isn't limited to postop swelling, compartment syndrome however patient has soft compartments, and pain that has not out of proportion, good distal pulses as noted and no neurovascular change.  She does have some swelling, we considered DVT is possible origin, ultrasounds ordered which were negative making DVT less likely.  This appears to be postop swelling.  X-rays were ordered which did not demonstrate hardware fracture or new fracture.  Pain was controlled with oral pain medications per Mar, stocking placed which patient state gave her relief, after visit summary printed, patient verbalizes understanding of return precautions, vital signs stable at time of discharge.    EKG independently interpreted no STEMI, normal sinus rhythm, poor QRS progression in precordial leads however appears similar to prior.    X-rays independently interpreted the re demonstrate femur fracture without displaced hardware.               ED Course as of 12/08/24 2225   Sun Dec 08, 2024   1554 Strong posterior tibial pulse and palpable DP pulse [KW]   1620 Hemoglobin appears at patient's baseline, not tachycardic and normotensive likely not secondary to blood loss. [KW]      ED Course User Index  [KW] Kristopher Meyer MD                           Clinical Impression:  Final diagnoses:  [R52] Pain  [R09.89] Suspected deep vein thrombosis (DVT)                 Kristopher Meyer MD  Resident  12/08/24 2236

## 2024-12-09 LAB
OHS QRS DURATION: 110 MS
OHS QTC CALCULATION: 506 MS

## 2024-12-09 NOTE — DISCHARGE INSTRUCTIONS
Thank you for choosing [facility] today, please  any prescriptions that have been printed for you at a pharmacy of your choice.    Please return to the emergency department if you have new or worsening pain, significant fevers, or worsening concerns.    Today we are treating you for pain and swelling after surgery.  Reasons to come back to the emergency department are new fevers chills nausea vomiting.    Concerning the leg, should you leg become more tender, difficult to move, change in color or cold you should return to the ED.

## 2024-12-09 NOTE — ED NOTES
Report received from Giselle CARLOS RN. Assume care of pt. Pt resting comfortably and independently repositioned in stretcher with bed locked in lowest position for safety. NAD noted at this time. Respirations even and unlabored and visible chest rise noted.  Patient offered bathroom assistance and denies need at this time. Pt instructed to call if assistance is needed. Pt on continuous cardiac, BP, and O2 monitoring. Call light within reach. Family at bedside. No needs at this time. Will continue to monitor.        APPEARANCE: awake and alert in NAD.  SKIN: warm, dry and intact. No breakdown or bruising.  MUSCULOSKELETAL: Patient moving all extremities spontaneously, no obvious swelling or deformities noted. R leg/hip pain and swelling, pedal pulse 2+, incision to R hip   RESPIRATORY: Denies shortness of breath.Respirations unlabored.   CARDIAC: Denies CP, 2+ distal pulses; no peripheral edema  ABDOMEN: S/ND/NT, Denies nausea  : voids spontaneously, denies difficulty  Neurologic: AAO x 4; follows commands equal strength in all extremities; denies numbness/tingling. Denies dizziness, YONNY, pupils 3mm.

## 2024-12-09 NOTE — ED NOTES
Care assumed at this time. Pt resting comfortably in bed. NAD noted. Side rails up x 2 at this time. Monitoring equipment in place

## 2024-12-12 ENCOUNTER — HOSPITAL ENCOUNTER (OUTPATIENT)
Dept: RADIOLOGY | Facility: HOSPITAL | Age: 89
Discharge: HOME | End: 2024-12-12
Attending: PHYSICAL MEDICINE & REHABILITATION
Payer: MEDICARE

## 2024-12-12 PROCEDURE — 93971 EXTREMITY STUDY: CPT | Mod: 26,RT,, | Performed by: RADIOLOGY

## 2024-12-17 ENCOUNTER — OUTPATIENT CASE MANAGEMENT (OUTPATIENT)
Dept: ADMINISTRATIVE | Facility: OTHER | Age: 89
End: 2024-12-17
Payer: MEDICARE

## 2024-12-18 ENCOUNTER — TELEPHONE (OUTPATIENT)
Dept: ORTHOPEDICS | Facility: CLINIC | Age: 89
End: 2024-12-18
Payer: MEDICARE

## 2024-12-18 NOTE — TELEPHONE ENCOUNTER
Attempted to call the patient's home health nurse back as requested in the message below.  There was no answer.  Call went to voicemail.  Left a message along with clinic number to have them return our phone call.    In review of the record, patient went to the emergency department on December 8, 2024 with similar complaints.  At that time ultrasound was performed which was negative for DVT.  Patient was instructed to elevate the leg and follow up with the surgeon as recommended.    ----- Message from Jeffery Rayo sent at 12/18/2024  1:56 PM CST -----  Regarding: FW: PT IS HAVING RIGHT LEG PAIN AND SWELLING, LOW GRADE FEVER OF 99.5 TEMP  Contact: BOLA    ----- Message -----  From: Lay Montilla  Sent: 12/18/2024   8:55 AM CST  To: Corn[facility] JOSE Staff  Subject: PT IS HAVING RIGHT LEG PAIN AND SWELLING, LO#    Pt's home health nurse is calling regarding pt's progress. .    BOLA  [phone]

## 2024-12-20 ENCOUNTER — OFFICE VISIT (OUTPATIENT)
Dept: INTERNAL MEDICINE | Facility: CLINIC | Age: 89
End: 2024-12-20
Payer: MEDICARE

## 2024-12-20 ENCOUNTER — PATIENT MESSAGE (OUTPATIENT)
Dept: ORTHOPEDICS | Facility: CLINIC | Age: 89
End: 2024-12-20
Payer: MEDICARE

## 2024-12-20 ENCOUNTER — LAB VISIT (OUTPATIENT)
Dept: LAB | Facility: HOSPITAL | Age: 89
End: 2024-12-20
Attending: INTERNAL MEDICINE
Payer: MEDICARE

## 2024-12-20 VITALS
DIASTOLIC BLOOD PRESSURE: 66 MMHG | OXYGEN SATURATION: 98 % | HEART RATE: 84 BPM | TEMPERATURE: 97 F | SYSTOLIC BLOOD PRESSURE: 132 MMHG

## 2024-12-20 DIAGNOSIS — E11.22 TYPE 2 DIABETES MELLITUS WITH STAGE 3A CHRONIC KIDNEY DISEASE, WITH LONG-TERM CURRENT USE OF INSULIN: ICD-10-CM

## 2024-12-20 DIAGNOSIS — Z09 HOSPITAL DISCHARGE FOLLOW-UP: Primary | ICD-10-CM

## 2024-12-20 DIAGNOSIS — S72.001D CLOSED FRACTURE OF RIGHT HIP WITH ROUTINE HEALING, SUBSEQUENT ENCOUNTER: ICD-10-CM

## 2024-12-20 DIAGNOSIS — N18.31 TYPE 2 DIABETES MELLITUS WITH STAGE 3A CHRONIC KIDNEY DISEASE, WITH LONG-TERM CURRENT USE OF INSULIN: ICD-10-CM

## 2024-12-20 DIAGNOSIS — M79.89 SWELLING OF LOWER EXTREMITY: ICD-10-CM

## 2024-12-20 DIAGNOSIS — I51.89 DIASTOLIC DYSFUNCTION: Chronic | ICD-10-CM

## 2024-12-20 DIAGNOSIS — I10 PRIMARY HYPERTENSION: Chronic | ICD-10-CM

## 2024-12-20 DIAGNOSIS — N18.31 STAGE 3A CHRONIC KIDNEY DISEASE: ICD-10-CM

## 2024-12-20 DIAGNOSIS — I48.0 PAROXYSMAL ATRIAL FIBRILLATION: Chronic | ICD-10-CM

## 2024-12-20 DIAGNOSIS — E11.29 TYPE 2 DIABETES MELLITUS WITH MICROALBUMINURIA, WITHOUT LONG-TERM CURRENT USE OF INSULIN: Chronic | ICD-10-CM

## 2024-12-20 DIAGNOSIS — M80.00XD AGE-RELATED OSTEOPOROSIS WITH CURRENT PATHOLOGICAL FRACTURE WITH ROUTINE HEALING, SUBSEQUENT ENCOUNTER: ICD-10-CM

## 2024-12-20 DIAGNOSIS — J45.40 MODERATE PERSISTENT ASTHMA WITHOUT COMPLICATION: ICD-10-CM

## 2024-12-20 DIAGNOSIS — Z23 NEED FOR VACCINATION: ICD-10-CM

## 2024-12-20 DIAGNOSIS — I70.0 AORTIC ATHEROSCLEROSIS: Chronic | ICD-10-CM

## 2024-12-20 DIAGNOSIS — R80.9 TYPE 2 DIABETES MELLITUS WITH MICROALBUMINURIA, WITHOUT LONG-TERM CURRENT USE OF INSULIN: Chronic | ICD-10-CM

## 2024-12-20 DIAGNOSIS — E11.69 HYPERLIPIDEMIA ASSOCIATED WITH TYPE 2 DIABETES MELLITUS: Chronic | ICD-10-CM

## 2024-12-20 DIAGNOSIS — E78.5 HYPERLIPIDEMIA ASSOCIATED WITH TYPE 2 DIABETES MELLITUS: Chronic | ICD-10-CM

## 2024-12-20 DIAGNOSIS — Z79.4 TYPE 2 DIABETES MELLITUS WITH STAGE 3A CHRONIC KIDNEY DISEASE, WITH LONG-TERM CURRENT USE OF INSULIN: ICD-10-CM

## 2024-12-20 LAB
ALBUMIN SERPL BCP-MCNC: 3.2 G/DL (ref 3.5–5.2)
ALP SERPL-CCNC: 204 U/L (ref 40–150)
ALT SERPL W/O P-5'-P-CCNC: 11 U/L (ref 10–44)
ANION GAP SERPL CALC-SCNC: 6 MMOL/L (ref 8–16)
AST SERPL-CCNC: 17 U/L (ref 10–40)
BILIRUB SERPL-MCNC: 0.5 MG/DL (ref 0.1–1)
BUN SERPL-MCNC: 17 MG/DL (ref 8–23)
CALCIUM SERPL-MCNC: 9.6 MG/DL (ref 8.7–10.5)
CHLORIDE SERPL-SCNC: 116 MMOL/L (ref 95–110)
CO2 SERPL-SCNC: 20 MMOL/L (ref 23–29)
CREAT SERPL-MCNC: 0.9 MG/DL (ref 0.5–1.4)
EST. GFR  (NO RACE VARIABLE): >60 ML/MIN/1.73 M^2
GLUCOSE SERPL-MCNC: 151 MG/DL (ref 70–110)
POTASSIUM SERPL-SCNC: 4.5 MMOL/L (ref 3.5–5.1)
PROT SERPL-MCNC: 6.2 G/DL (ref 6–8.4)
SODIUM SERPL-SCNC: 142 MMOL/L (ref 136–145)

## 2024-12-20 PROCEDURE — 80053 COMPREHEN METABOLIC PANEL: CPT | Performed by: INTERNAL MEDICINE

## 2024-12-20 PROCEDURE — 36415 COLL VENOUS BLD VENIPUNCTURE: CPT | Mod: PO | Performed by: INTERNAL MEDICINE

## 2024-12-20 PROCEDURE — 99999 PR PBB SHADOW E&M-EST. PATIENT-LVL IV: CPT | Mod: PBBFAC,,, | Performed by: INTERNAL MEDICINE

## 2024-12-20 PROCEDURE — 99214 OFFICE O/P EST MOD 30 MIN: CPT | Mod: PBBFAC,PO | Performed by: INTERNAL MEDICINE

## 2024-12-20 RX ORDER — FUROSEMIDE 40 MG/1
40 TABLET ORAL 2 TIMES DAILY
Qty: 5 TABLET | Refills: 0 | Status: SHIPPED | OUTPATIENT
Start: 2024-12-20 | End: 2025-12-20

## 2024-12-20 NOTE — PROGRESS NOTES
Assessment:       1. Hospital discharge follow-up    2. Closed fracture of right hip with routine healing, subsequent encounter    3. Swelling of lower extremity  - Comprehensive Metabolic Panel; Future  - furosemide (LASIX) 40 MG tablet; Take 1 tablet (40 mg total) by mouth 2 (two) times daily.  Dispense: 5 tablet; Refill: 0    4. Age-related osteoporosis with current pathological fracture with routine healing, subsequent encounter    5. Type 2 diabetes mellitus with microalbuminuria, without long-term current use of insulin    6. Type 2 diabetes mellitus with stage 3a chronic kidney disease, with long-term current use of insulin    7. Primary hypertension    8. Hyperlipidemia associated with type 2 diabetes mellitus    9. Aortic atherosclerosis    10. Paroxysmal atrial fibrillation    11. Diastolic dysfunction    12. Stage 3a chronic kidney disease    13. Moderate persistent asthma without complication        Plan:       1/2/3/4.  Follow up with Orthopedics as scheduled.  Start Lasix 40 mg daily for 5 days.  Check CMP.  Return to clinic in 2 weeks to reassess.  5/6.  Continue Lantus 5 units, NovoLog 0210 units before meals, Jardiance 10 mg.  7.  Continue losartan 100 mg.   8/9.  Monitor   6. Continue Eliquis 2.5 mg twice daily.    10. Continue Jardiance 10 mg.   11. Continue Jardiance 10 mg.   12. Monitor.  13. Monitor.    Assessment & Plan    IMPRESSION:  1. Assessed patient post-hip surgery and rehab, noting improved kidney function  2. Evaluated wound healing from fall  3. Determined patient's blood sugar are currently well-controlled on adjusted insulin regimen  4. Assessed leg redness, concluding it is likely due to swelling rather than cellulitis  5. Will initiate short-term diuretic therapy to address edema    FEMUR FRACTURE:   MsValeriano Igor to engage in prescribed home health therapy, transitioning to outpatient rehab upon discharge.   Ms. Costa to call to clarify conflicting orthopedic appointment dates.    Explained proper wound care: clean with soap and water, no peroxide, leave open to air.   Discussed the difference between swelling-related discoloration and cellulitis symptoms.    TYPE 2 DIABETES MELLITUS:   Ms. Costa to maintain current insulin regimen as long as blood sugar remains controlled.   Continued current insulin regimen: Lantus 5 units at night, Novolog 0-10 units before meals.    HEART FAILURE AND EDEMA:   Started Lasix 40 mg daily for 5 days to reduce fluid retention.   Ms. Costa to continue elevating feet to manage swelling.    CHRONIC KIDNEY DISEASE:   Lab work ordered on 5th floor to check kidney function.    PREVENTIVE CARE:   Flu shot administered in office.    FOLLOW-UP:   Follow up in approximately 2 weeks.   Follow up in 6 months.                 This note was generated with the assistance of ambient listening technology. Verbal consent was obtained by the patient and accompanying visitor(s) for the recording of patient appointment to facilitate this note. I attest to having reviewed and edited the generated note for accuracy, though some syntax or spelling errors may persist. Please contact the author of this note for any clarification.       Subjective:       Patient ID: Sussy Costa is a 89 y.o. female.    Chief Complaint: Hospital Follow Up    HPI    89-year-old female here for follow-up of hospitalization. 12/13/24    Family and/or Caretaker present at visit?  Yes.  Diagnostic tests reviewed/disposition: I have reviewed all completed as well as pending diagnostic tests at the time of discharge.  Disease/illness education: right hip fracture  Home health/community services discussion/referrals: Patient has home health established at [facility] .   Establishment or re-establishment of referral orders for community resources: No other necessary community resources.   Discussion with other health care providers: No discussion with other health care providers necessary.  I  reviewed and reconciled the medications from hospital discharge.    Patient has diabetes on Lantus 5 units, NovoLog 0-10 units before meals, Jardiance 10 mg  Lab Results   Component Value Date    HGBA1C 5.9 (H) 11/25/2024    HGBA1C 5.7 (H) 09/16/2024    HGBA1C 5.9 (H) 05/08/2024     Lab Results   Component Value Date    LDLCALC 62.8 (L) 09/19/2023    CREATININE 1.0 12/08/2024     Patient has hypertension on losartan 100 mg.    Patient has hyperlipidemia, aortic atherosclerosis on no current medication.    Patient has paroxysmal atrial fibrillation on Eliquis 2.5 mg twice daily.      Patient has diastolic dysfunction on Jardiance 10 mg.    Patient has stage IIIA chronic kidney disease on Jardiance 10 mg.    Patient has moderate asthma on no current medication.    Discharge summary:  History of Present Illness/Past Medical History:  Ms. Sussy Costa is a 89 y.o. female with PMHx of Type 2 DM, CKD, HTN, pancreatic insufficiency, asthma, a fib (Eliquis), mechanical fall 3/30/2024 and found to have a intertrochanteric fracture of left femur. S/p CMN 3/31/24. Patient now presents to [facility] on 11/25/24 after a mechanical fall walking to bathroom and fell on R hip and scraped R arm. CTH revealed no acute intracranial hemorrhage. Dressing in place to R arm and RUE laceration repaired. Imaging revealed R femur intertrochanteric fracture. S/p R femur IMN on 11/25/24. Per Ortho recommend WBAT RLE. Hospital course complicated by nausea started on Zofran and Compazine. ECHO done and Ejection fraction is approximately 68%.       Patient was deemed medically stable and transferred to [facility] to participate in acute inpatient rehabilitation on 11/29/2024.       Hospital Course:  Patient was admitted and enrolled in a comprehensive rehabilitation program, including PT, OT, and as required SLP.    ADARSH (acute kidney injury)  ADARSH is likely due to pre-renal azotemia due to intravascular volume depletion. Baseline  creatinine is 1.0 on admit  Plan  - ADARSH is improving after transfused 2 units of PRBCs on 11/27.   - Avoid nephrotoxins and renally dose meds for GFR listed above  - Monitor urine output, serial BMP, and adjust therapy as needed  - Improved with IVF's and blood transfusion on 11/27.       Hypophosphatemia  Plan  - Will treat hypophosphatemia with oral Neutra Phos.   - Monitor daily phosphorus level.      Acute blood loss anemia  Anemia is likely due to acute blood loss which was from surgery and hip fracture and expected blood loss  Plan  - Monitor serial CBC: Daily  - Transfuse PRBC if patient becomes hemodynamically unstable, symptomatic or H/H drops below 7/21.  - Patient has received 2 units of PRBCs on 11/27 for Hgb 7.3.      Laceration of right forearm  Patient with right arm laceration on admit and (5) sutures placed in ER and covered on admit, due in 7-10 days for removal around 12/3.       Primary hypertension  Plan  - BP is controlled, no changes needed to their regimen  - Home Losartan on hold due to ADARSH.       Vitamin D insufficiency  Present on admit. Vitamin D 28 on admit and started on oral replacement 1000 units po daily and continue on discharge.       Paroxysmal atrial fibrillation  Continue to monitor rate      Type 2 diabetes mellitus with hyperglycemia  Hold Oral hypoglycemics while patient is in the hospital.  Monitor blood sugars with meals and at bedtime in hospital.  Target blood sugars 140-180 in hospital.  Diabetic diet.       Moderate persistent asthma without complication  Chronic and controlled. Patient Trelegy Ellipta inhaler at home but not on formulary and hold as inpatient. Follows closely with Pulmonary as outpatient. Dudevyn prn in hospital.       Pancreatic insufficiency  Chronic condition. Continue home Creon po TID with meals to treat.       Primary open angle glaucoma (POAG) of both eyes, mild stage  Chronic and controlled. Continue home Latanoprost eye drops to treat.      History of Present Illness    CHIEF COMPLAINT:  89-year-old female presents today for follow-up.    ORTHOPEDIC HISTORY:  She recently underwent surgical intervention for a right hip fracture following a fall while using the bathroom. Post-operatively, she experienced expected blood loss. She has a history of left hip fracture that occurred at [facility].    CARDIOVASCULAR:  She has paroxysmal atrial fibrillation managed with Lisinopril 2.5 mg twice daily. She also has aortic atherosclerosis without current medication. She is on Apixaban for post-operative management and using compression stockings.    DIABETES:  She manages her diabetes with Lantus 5 units at night, Novolog 0-10 units before meals, and Jardiance 10 mg.    HYPERTENSION:  She takes Losartan 100 mg for hypertension management.    CHRONIC CONDITIONS:  She has stage 3 chronic kidney disease and diastolic dysfunction, both managed with Jardiance 10 mg. She has moderate asthma without current medication. She has hyperlipidemia without current medication.    OSTEOPOROSIS:  She receives Prolia every six months for osteoporosis management and is followed by endocrinology.    CURRENT SYMPTOMS:  She reports leg discoloration and redness, with prior concerns for cellulitis raised by nursing staff. She has been keeping feet elevated to manage symptoms.      ROS:  Musculoskeletal: -limb pain         Review of Systems          Objective:      Physical Exam  Vitals reviewed.   Constitutional:       Appearance: She is well-developed.   HENT:      Head: Normocephalic and atraumatic.      Mouth/Throat:      Pharynx: No oropharyngeal exudate.   Eyes:      General: No scleral icterus.        Right eye: No discharge.         Left eye: No discharge.      Pupils: Pupils are equal, round, and reactive to light.   Neck:      Thyroid: No thyromegaly.      Trachea: No tracheal deviation.   Cardiovascular:      Rate and Rhythm: Normal rate and regular rhythm.      Heart sounds:  Normal heart sounds. No murmur heard.     No friction rub. No gallop.   Pulmonary:      Effort: Pulmonary effort is normal. No respiratory distress.      Breath sounds: Normal breath sounds. No wheezing or rales.   Chest:      Chest wall: No tenderness.   Abdominal:      General: Bowel sounds are normal. There is no distension.      Palpations: Abdomen is soft. There is no mass.      Tenderness: There is no abdominal tenderness. There is no guarding or rebound.   Musculoskeletal:         General: Swelling (2+ pitting edema bilaterally) present. No tenderness. Normal range of motion.      Cervical back: Normal range of motion and neck supple.   Skin:     General: Skin is warm and dry.      Coloration: Skin is not pale.      Findings: No erythema or rash.   Neurological:      Mental Status: She is alert and oriented to person, place, and time.   Psychiatric:         Behavior: Behavior normal.

## 2024-12-21 DIAGNOSIS — R74.8 ELEVATED ALKALINE PHOSPHATASE LEVEL: Primary | ICD-10-CM

## 2024-12-21 NOTE — PROGRESS NOTES
Your electrolytes, kidney/liver function are normal.  Your alkaline phosphatase is elevated.  I am going to order further labs for you.

## 2024-12-23 ENCOUNTER — TELEPHONE (OUTPATIENT)
Dept: INTERNAL MEDICINE | Facility: CLINIC | Age: 89
End: 2024-12-23
Payer: MEDICARE

## 2024-12-23 PROBLEM — R91.8 MULTIPLE LUNG NODULES ON CT: Status: ACTIVE | Noted: 2024-12-23

## 2024-12-23 NOTE — ASSESSMENT & PLAN NOTE
Chest CTs reviewed from 10/2024; 12/2022; 5/2022; and 10/2011.  No significant interval change in appearance of sub-centimeter solid and sub-solid nodules over that timeframe.    CT surveillance as appropriate for follow up of colon cancer -- otherwise would plan repeat non-contrast chest CT in 2 years.

## 2025-01-04 NOTE — TELEPHONE ENCOUNTER
Care Due:                  Date            Visit Type   Department     Provider  --------------------------------------------------------------------------------                                EP -                              PRIMARY      MET INTERNAL  Last Visit: 12-      CARE ([facility])   REBECCA Ramírez                              EP -                              PRIMARY      [facility] INTERNAL  Next Visit: 06-      CARE ([facility])   REBECCA Ramírez                                                            Last  Test          Frequency    Reason                     Performed    Due Date  --------------------------------------------------------------------------------    Lipid Panel.  12 months..  atorvastatin.............  09- 09-    [facility] Embedded Care Due Messages. Reference number: [id number].   1/04/2025 3:33:02 PM CST

## 2025-01-06 ENCOUNTER — HOSPITAL ENCOUNTER (OUTPATIENT)
Dept: RADIOLOGY | Facility: HOSPITAL | Age: 89
Discharge: HOME | End: 2025-01-06
Attending: NURSE PRACTITIONER
Payer: MEDICARE

## 2025-01-06 ENCOUNTER — OFFICE VISIT (OUTPATIENT)
Dept: ORTHOPEDICS | Facility: CLINIC | Age: 89
End: 2025-01-06
Payer: MEDICARE

## 2025-01-06 DIAGNOSIS — S72.141D CLOSED DISPLACED INTERTROCHANTERIC FRACTURE OF RIGHT FEMUR WITH ROUTINE HEALING, SUBSEQUENT ENCOUNTER: Primary | ICD-10-CM

## 2025-01-06 DIAGNOSIS — R52 PAIN: ICD-10-CM

## 2025-01-06 DIAGNOSIS — Z98.890 POST-OPERATIVE STATE: ICD-10-CM

## 2025-01-06 DIAGNOSIS — R52 PAIN: Primary | ICD-10-CM

## 2025-01-06 PROCEDURE — 99212 OFFICE O/P EST SF 10 MIN: CPT | Mod: PBBFAC,25 | Performed by: NURSE PRACTITIONER

## 2025-01-06 PROCEDURE — 99999 PR PBB SHADOW E&M-EST. PATIENT-LVL II: CPT | Mod: PBBFAC,,, | Performed by: NURSE PRACTITIONER

## 2025-01-06 PROCEDURE — 73552 X-RAY EXAM OF FEMUR 2/>: CPT | Mod: 26,RT,, | Performed by: RADIOLOGY

## 2025-01-06 PROCEDURE — 73552 X-RAY EXAM OF FEMUR 2/>: CPT | Mod: TC,RT

## 2025-01-06 PROCEDURE — 72170 X-RAY EXAM OF PELVIS: CPT | Mod: TC

## 2025-01-06 PROCEDURE — 72170 X-RAY EXAM OF PELVIS: CPT | Mod: 26,,, | Performed by: RADIOLOGY

## 2025-01-06 PROCEDURE — 99024 POSTOP FOLLOW-UP VISIT: CPT | Mod: POP,,, | Performed by: NURSE PRACTITIONER

## 2025-01-06 RX ORDER — MONTELUKAST SODIUM 10 MG/1
10 TABLET ORAL NIGHTLY
Qty: 90 TABLET | Refills: 1 | Status: SHIPPED | OUTPATIENT
Start: 2025-01-06

## 2025-01-06 RX ORDER — ATORVASTATIN CALCIUM 20 MG/1
20 TABLET, FILM COATED ORAL NIGHTLY
Qty: 90 TABLET | Refills: 3 | Status: SHIPPED | OUTPATIENT
Start: 2025-01-06

## 2025-01-06 RX ORDER — METHOCARBAMOL 500 MG/1
500 TABLET, FILM COATED ORAL 3 TIMES DAILY
Qty: 30 TABLET | Refills: 0 | Status: SHIPPED | OUTPATIENT
Start: 2025-01-06 | End: 2025-01-16

## 2025-01-06 NOTE — PROGRESS NOTES
Ms. Costa is here today for a post-operative visit after undergoing the following:      ICD-10-CM ICD-9-CM   1. Closed displaced intertrochanteric fracture of right femur with routine healing, subsequent encounter  S72.141D V54.13   2. Post-operative state  Z98.890 V45.89       by Dr. Blunt on 11/25/2024.    Interval History:  She reports that she is doing good.  She states their pain is good.  She is not taking pain medication.  She reports she uses Tylenol when she gets pain which helps control her pain.  She is walking with a walker.  She is currently  home getting home health.  She is hoping to transition to [place] outpatient PT soon.   She does endorse occasional muscle spasms in her hip, knee and lower back.  She denies any falls or injuries since surgery/last seen in the clinic.  She denies fever, chills, and sweats since the time of the surgery.     Physical exam:  The patient's incision is open to air and healed.  No signs of infection noted.  She has tactile stimulation to their lower leg, she denies calf pain, there is no leg edema and their pedal pulse is palpable x 2.  No pain with axial loading, no pain with log roll.  Hip range of motion 0-90 degrees.    RADS: right Femur and pelvis xray was obtained, findings show IM nailing appears to be in good position and alignment.  Femoral neck fracture appears to be stable.  No evidence of hardware failure or new fracture seen.    Assessment:  Post-op visit (6 weeks)    Plan:    ICD-10-CM ICD-9-CM   1. Closed displaced intertrochanteric fracture of right femur with routine healing, subsequent encounter  S72.141D V54.13   2. Post-operative state  Z98.890 V45.89     Current care, treatment plan, precautions, activity level/ modifications, limitations, rehabilitation exercises and proposed future treatment were discussed with the patient. We discussed the need to monitor for changes in symptoms and condition and report them to the physician.  Discussed  importance of compliance with all appointments and follow up examinations.       PHYSICAL THERAPY:   - Physical therapy as ordered.  Patient is currently getting home health therapy.  I will write orders for outpatient physical therapy with [facility] to begin in 2 weeks per patient request..  - Weight bearing as tolerated   - Range of motion as tolerated.      PAIN MEDICATION:   - Pain medication: refill was not needed.  - Pain medication refill policy provided to patient for review, yes.    - Patient was informed a multi-modal approach is used to treat their pain.     DVT PROPHYLAXIS:   -  completed therapy    FRAGILITY:  - Patient has been referred to the fracture clinic.  Patient is followed by endocrinology.  I will send a message to her team at endocrine to see if they will evaluate her for osteoporosis.     FOLLOW UP:   - Patient will follow up in the clinic in 6 weeks.  - X-ray of her right femur and pelvis is needed.    - Future Appointments:   Future Appointments   Date Time Provider Department Center   1/9/2025  1:30 PM Leon Maldonado, BA [facility] IM [place]   1/14/2025  1:30 PM Heike Ye NP [facility] ENDODIA [address]   2/18/2025 10:30 AM Johan Locke NP [facility] ORTHO [address]   2/20/2025 11:45 AM INJECTION [facility]OM [facility] INF [facility]   2/27/2025  2:15 PM Alen Campa, DPM [facility] POD [address]   4/9/2025  2:00 PM LAB, HEMONC CANCER BLDG NOM LAB [facility]   4/10/2025  2:30 PM Damaris Valadez CNS [facility] HEMONC2 [address]   6/20/2025  1:20 PM Leon Ramírez MD [facility] IM [place]         If there are any questions prior to scheduled follow up, the patient was instructed to contact the office

## 2025-01-09 ENCOUNTER — DOCUMENTATION ONLY (OUTPATIENT)
Dept: ORTHOPEDICS | Facility: CLINIC | Age: 89
End: 2025-01-09
Payer: MEDICARE

## 2025-01-10 NOTE — PROGRESS NOTES
Discussed femur xray findings with Dr. Blunt.  He reviewed images.  Said to continue course, no change in current treatment plan.

## 2025-01-13 DIAGNOSIS — Z00.00 ENCOUNTER FOR MEDICARE ANNUAL WELLNESS EXAM: ICD-10-CM

## 2025-01-13 NOTE — PROGRESS NOTES
Subjective:      Patient ID: Sussy Costa is a 89 y.o. female.    Chief Complaint:  Osteoporosis    History of Present Illness  Sussy Costa is here for follow up of DM and Osteoporosis.  Previously seen by me 9/2024.      With regards to Diabetes:     Latest Reference Range & Units 03/08/22 10:13   Glucose 70 - 110 mg/dL 207 (H)   C-Peptide 0.78 - 5.19 ng/mL 0.77 (L)   (H): Data is abnormally high  (L): Data is abnormally low  DE: 8/2021    Known complications:  DKA Denies   RN Denies  Eye Exam: 8/2024  PN Yes  Podiatry: August or July 2024  Nephropathy Denies  CAD Denies  Reports history of pancreatitis - on Creon.     Diet/Exercise:  Eats 3 meals a day.   Snacks : occasionally before bed  Drinks : water, soft drinks (diet or regular)   Exercise - tries to stay active   Recent illness, injury, steroids: hip fracture - surgery     Current Regimen: of note, regimen changed when discharged from rehab facility  Jardiance 10 mg daily - started by Cardiology 6/2024  Lantus 5 units in the PM   Novolog 5 units before meals   Add correction scale if needed.   Blood sugar 180 to 230 add 1 units   Blood sugar 231 to 280 add 2 units   Blood sugar greater than 281 add 3 units      Reports compliance.     Other medications tried:  None.     Glucose Monitor:   4 times a day testing  Log reviewed: log provided and reviewed, scanned in media tab                Hypoglycemia:  Yes - before lunch.  Knows how to correct with 15 grams of carbs- juice, coke, or a peppermint.       Diabetes Management Status    Hemoglobin A1C   Date Value Ref Range Status   01/06/2025 5.6 4.0 - 5.6 % Final     Comment:     ADA Screening Guidelines:  5.7-6.4%  Consistent with prediabetes  >or=6.5%  Consistent with diabetes    High levels of fetal hemoglobin interfere with the HbA1C  assay. Heterozygous hemoglobin variants (HbS, HgC, etc)do  not significantly interfere with this assay.   However, presence of multiple variants may affect accuracy.      11/25/2024 5.9 (H) 4.0 - 5.6 % Final     Comment:     ADA Screening Guidelines:  5.7-6.4%  Consistent with prediabetes  >or=6.5%  Consistent with diabetes    High levels of fetal hemoglobin interfere with the HbA1C  assay. Heterozygous hemoglobin variants (HbS, HgC, etc)do  not significantly interfere with this assay.   However, presence of multiple variants may affect accuracy.     09/16/2024 5.7 (H) 4.0 - 5.6 % Final     Comment:     ADA Screening Guidelines:  5.7-6.4%  Consistent with prediabetes  >or=6.5%  Consistent with diabetes    High levels of fetal hemoglobin interfere with the HbA1C  assay. Heterozygous hemoglobin variants (HbS, HgC, etc)do  not significantly interfere with this assay.   However, presence of multiple variants may affect accuracy.         Statin: Taking  ACE/ARB: Taking  Screening or Prevention Patient's value Goal Complete/Controlled?   HgA1C Testing and Control   Lab Results   Component Value Date    HGBA1C 5.6 01/06/2025      Annually/Less than 8% Yes     Lipid profile : 09/19/2023 Annually Yes     LDL control Lab Results   Component Value Date    LDLCALC 62.8 (L) 09/19/2023    Annually/Less than 100 mg/dl  Yes     Nephropathy screening Lab Results   Component Value Date    LABMICR 31.0 04/22/2024     Lab Results   Component Value Date    PROTEINUA Negative 11/25/2024    Annually Yes     Blood pressure BP Readings from Last 1 Encounters:   01/14/25 123/88    Less than 140/90 No     Dilated retinal exam : 08/23/2024 Annually No     Foot exam   Most Recent Foot Exam Date: Not Found Annually Yes       With regards to Osteoporosis:     First diagnosed with osteoporosis in 2007.    BMD:   11/7/2023  Impression:  *Osteoporosis on treatment with Denosumab.  *Fracture risk is very high due to calculated 10 year risk of hip fracture >4.5% (FRAX)  *Compared with previous DXA, BMD at the lumbar spine has remained stable, and BMD at the total hip has declined by 8.2%.  RECOMMENDATIONS:  *Daily  calcium intake 1200 mg, dietary sources preferred; Vitamin D 800-1000  *Weight bearing exercise and fall precautions.  *Given very high fracture risk, and significant decline in the hip BMD on denosumab would consider a 12 month treatment course of romosozumab followed by resumption of denosumab.  Depending on contraindications.  *Repeat BMD in 2 years  *If not recently completed, would recommend lateral thoracic and lumbar x-rays looking for prevalent fractures.    Medications:   Actonel 8/2018 - 12/2021  She was given first dose of Prolia in 2/2018 and developed joint pains and lower ext edema so it was decided that she would not receive another dose of Prolia. We decided against Reclast for the same reason and instead restarted her on an oral bisphosphonate which she has tolerated without side effects. After her last visit 12/2021 we decided to stop the oral bisphosphonate and try Prolia again. She received Prolia  in 1/2022 without any side effects.  Start: 2022  Last Dose: 8/2024    Calcium intake: dietary sources   Vit D intake: OTC Vit D3 2000iu daily     Weight bearing exercise: walking   Falls:   11/2024 - fell getting up in the middle of the night to use the bathroom   Fractures:   11/25/24 after a mechanical fall walking to bathroom and fell on R hip and scraped R arm - S/p R femur IMN on 11/25/24.   4/2024 - fractured hip   Significant height loss (>2 inches): ~ 1/2  inch     Family history: Denies    Menopause: 56y.o.  No HRT.    Tobacco Use: Denies  Alcohol Use: Denies  Glucocorticoid History: Prednisone over the years for asthma.   Anticoagulant Use: Denies  GERD/PPI Use: Denies  History of Malabsorption: Yes  Antiseizure Medications: Denies  History of Thyroid Disease: Denies  Kidney Stones/ Kidney Disease: Denies  Personal history of kidney stones: Denies  Family history of kidney stones: Denies  Family history of bone disease or fracture: Denies  MI: Denies  Stroke: Denies  Colon Cancer:  10/2022 -  "denies any bone mets or radiation   Dental Visit: UTD       Lab Results   Component Value Date    CALCIUM 9.8 01/14/2025    PHOS 3.8 01/14/2025     Vit D, 25-Hydroxy   Date Value Ref Range Status   01/06/2025 29 (L) 30 - 96 ng/mL Final     Comment:     Vitamin D deficiency.........<10 ng/mL                              Vitamin D insufficiency......10-29 ng/mL       Vitamin D sufficiency........> or equal to 30 ng/mL  Vitamin D toxicity............>100 ng/mL         Review of Systems  As above    Objective:   Physical Exam  Vitals reviewed.   Cardiovascular:      Rate and Rhythm: Normal rate.      Comments: No edema present  Pulmonary:      Effort: Pulmonary effort is normal.   Abdominal:      Palpations: Abdomen is soft.       Injection sites are without edema or erythema. No lipo hypertropthy or atrophy.    Visit Vitals  /88 (BP Location: Right arm, Patient Position: Sitting)   Pulse 77   Ht 5' 5" (1.651 m)   Wt 52.2 kg (115 lb 1.3 oz)   SpO2 98%   BMI 19.15 kg/m²         Body mass index is 19.15 kg/m².    Lab Review:   Lab Results   Component Value Date    HGBA1C 5.6 01/06/2025    HGBA1C 5.9 (H) 11/25/2024    HGBA1C 5.7 (H) 09/16/2024       Lab Results   Component Value Date    CHOL 124 09/19/2023    HDL 53 09/19/2023    LDLCALC 62.8 (L) 09/19/2023    TRIG 41 09/19/2023    CHOLHDL 42.7 09/19/2023     Lab Results   Component Value Date     01/14/2025    K 3.4 (L) 01/14/2025     (H) 01/14/2025    CO2 21 (L) 01/14/2025     (H) 01/14/2025    BUN 21 01/14/2025    CREATININE 0.9 01/14/2025    CALCIUM 9.8 01/14/2025    PROT 7.2 01/06/2025    ALBUMIN 3.6 01/14/2025    BILITOT 0.4 01/06/2025    ALKPHOS 124 01/06/2025    AST 22 01/06/2025    ALT 16 01/06/2025    ANIONGAP 8 01/14/2025    ESTGFRAFRICA >60.0 07/11/2022    EGFRNONAA >60.0 07/11/2022    TSH 3.278 01/14/2025     Vit D, 25-Hydroxy   Date Value Ref Range Status   01/06/2025 29 (L) 30 - 96 ng/mL Final     Comment:     Vitamin D " deficiency.........<10 ng/mL                              Vitamin D insufficiency......10-29 ng/mL       Vitamin D sufficiency........> or equal to 30 ng/mL  Vitamin D toxicity............>100 ng/mL       Assessment and Plan     1. Type 2 diabetes mellitus with stage 3a chronic kidney disease, with long-term current use of insulin        2. Senile osteoporosis  Renal Function Panel    PTH, Intact    TSH    Tissue Transglutaminase, IgA      3. Abnormal findings on diagnostic imaging of other specified body structures  TSH      4. Type 2 diabetes mellitus with microalbuminuria, without long-term current use of insulin  insulin glargine U-100, Lantus, (LANTUS SOLOSTAR U-100 INSULIN) 100 unit/mL (3 mL) InPn pen          Type 2 diabetes mellitus with stage 3a chronic kidney disease, with long-term current use of insulin  -- Labs prior to follow up.  -- History of chronic pancreatitis. Low C peptide - manage as T1DM.  -- Brittle diabetic.  -- A1c goal < or = 8%.  -- Medications discussed:  SGLT2i  Reviewed potential adverse effects of SGLT-2 inhibitors, including genital mycotic infections, slightly increased risk of UTI, hypersensitivity, hypotension, and hyperkalemia. Advised to maintain water intake of 8-10 cups per day. Advised we need to check chemistry panel at baseline and 2 weeks after starting. Discussed FDA warning reports of ketoacidosis associated with SGLT-2 inhibitors. Advised to seek immediate medical attention and stop the medication if symptoms such as difficulty breathing, nausea, vomiting, abdominal pain, confusion, and unusual fatigue/sleepiness. Discussed possible precipitating factors including major illness/reduced food and fluid intake (advised to stop under these circumstances), and reduced insulin dose. Discussed possible effects of increased fracture risk/decreased bone density. Discussed reports of increased risk of leg and foot amputations with canagliflozin and need to seek urgent care if  developed new pain or tenderness, sores or ulcers, or infections in legs/feet.   Insulin   -- Reviewed logs/CGM:  Instructed to send glucose logs in 7-14 days.  Reach out to me sooner for any glucose <90 or consistently >200.  Not interested in personal CGM.  -- Medication Changes:   Jardiance 10 mg daily prescribed by Cardiology  Lantus 5 units in the PM   Novolog 3-5-5 units plus correction scale before meals   Add correction scale if needed.   Blood sugar 200-250 add 1 units   Blood sugar greater than 251 add 2 units       -- Reviewed goals of therapy are to get the best control we can without hypoglycemia.  -- Reviewed patient's current insulin regimen. Clarified proper insulin dose and timing in relation to meals, etc. Insulin injection sites and proper rotation instructed.   -- Advised frequent self blood glucose monitoring.  Patient encouraged to document glucose results and bring them to every clinic visit.  -- Hypoglycemia precautions discussed. Instructed on precautions before driving.    -- Call for Bg repeatedly < 90 or > 180.   -- Close adherence to lifestyle changes recommended.   -- Periodic follow ups for eye evaluations, foot care and dental care suggested.    Senile osteoporosis  -- Risks include low weight,  race, menopause, prior chronic glucocorticoid use, malabsorption.  -- Reviewed basics of quantity versus quality.  -- Recommend:  -Pharmacological therapy is recommended for patients with osteopenia if the 10 year probability of a hip fracture is >3% and 10 year probability of other major osteoporotic fractures is >20%.  Treatment options and potential side effects discussed for PO bisphosphonates, reclast, Denosumab, and Teriparatide.   -Treatment:   Actonel 8/2018 - 12/2021  She was given first dose of Prolia in 2/2018 and developed joint pains and lower ext edema so it was decided that she would not receive another dose of Prolia. We decided against Reclast for the same reason and  instead restarted her on an oral bisphosphonate which she has tolerated without side effects. After her last visit 12/2021 we decided to stop the oral bisphosphonate and try Prolia again. She received Prolia  in 1/2022 without any side effects.  Last Dose: 8/2024  Continue Prolia every 6 months.  -Calcium and Vitamin D RDD provided.  -Exercise: recommended..  -Fall precautions made in the home.  -Alerted that if dental work needs to be done it should be done prior to initiating therapy. Dental health: UTD.  -- Previously reviewed BMD from 2023 noting decline - discussed switching to anabolic vs continuing (1/2024 - she declined). Now with a second hip fracture she is agreeable. Will check labs and consider adding anabolic to Prolia treatment. Discussed [facility] - she is concerned about potential lightheadedness. Will review case at conference.       Follow up in about 4 months (around 5/14/2025).    Visit today included increased complexity associated with the care of the problems addressed and managing the longitudinal care of the patient due to the serious and/or complex managed problems.

## 2025-01-14 ENCOUNTER — OFFICE VISIT (OUTPATIENT)
Dept: ENDOCRINOLOGY | Facility: CLINIC | Age: 89
End: 2025-01-14
Payer: MEDICARE

## 2025-01-14 ENCOUNTER — LAB VISIT (OUTPATIENT)
Dept: LAB | Facility: HOSPITAL | Age: 89
End: 2025-01-14
Payer: MEDICARE

## 2025-01-14 VITALS
WEIGHT: 115.06 LBS | HEIGHT: 65 IN | BODY MASS INDEX: 19.17 KG/M2 | SYSTOLIC BLOOD PRESSURE: 123 MMHG | DIASTOLIC BLOOD PRESSURE: 88 MMHG | OXYGEN SATURATION: 98 % | HEART RATE: 77 BPM

## 2025-01-14 DIAGNOSIS — R93.89 ABNORMAL FINDINGS ON DIAGNOSTIC IMAGING OF OTHER SPECIFIED BODY STRUCTURES: ICD-10-CM

## 2025-01-14 DIAGNOSIS — E11.22 TYPE 2 DIABETES MELLITUS WITH STAGE 3A CHRONIC KIDNEY DISEASE, WITH LONG-TERM CURRENT USE OF INSULIN: Primary | Chronic | ICD-10-CM

## 2025-01-14 DIAGNOSIS — E11.29 TYPE 2 DIABETES MELLITUS WITH MICROALBUMINURIA, WITHOUT LONG-TERM CURRENT USE OF INSULIN: Chronic | ICD-10-CM

## 2025-01-14 DIAGNOSIS — M81.0 SENILE OSTEOPOROSIS: ICD-10-CM

## 2025-01-14 DIAGNOSIS — Z79.4 TYPE 2 DIABETES MELLITUS WITH STAGE 3A CHRONIC KIDNEY DISEASE, WITH LONG-TERM CURRENT USE OF INSULIN: Primary | Chronic | ICD-10-CM

## 2025-01-14 DIAGNOSIS — R80.9 TYPE 2 DIABETES MELLITUS WITH MICROALBUMINURIA, WITHOUT LONG-TERM CURRENT USE OF INSULIN: Chronic | ICD-10-CM

## 2025-01-14 DIAGNOSIS — N18.31 TYPE 2 DIABETES MELLITUS WITH STAGE 3A CHRONIC KIDNEY DISEASE, WITH LONG-TERM CURRENT USE OF INSULIN: Primary | Chronic | ICD-10-CM

## 2025-01-14 LAB
ALBUMIN SERPL BCP-MCNC: 3.6 G/DL (ref 3.5–5.2)
ANION GAP SERPL CALC-SCNC: 8 MMOL/L (ref 8–16)
BUN SERPL-MCNC: 21 MG/DL (ref 8–23)
CALCIUM SERPL-MCNC: 9.8 MG/DL (ref 8.7–10.5)
CHLORIDE SERPL-SCNC: 114 MMOL/L (ref 95–110)
CO2 SERPL-SCNC: 21 MMOL/L (ref 23–29)
CREAT SERPL-MCNC: 0.9 MG/DL (ref 0.5–1.4)
EST. GFR  (NO RACE VARIABLE): >60 ML/MIN/1.73 M^2
GLUCOSE SERPL-MCNC: 140 MG/DL (ref 70–110)
PHOSPHATE SERPL-MCNC: 3.8 MG/DL (ref 2.7–4.5)
POTASSIUM SERPL-SCNC: 3.4 MMOL/L (ref 3.5–5.1)
PTH-INTACT SERPL-MCNC: 96.7 PG/ML (ref 9–77)
SODIUM SERPL-SCNC: 143 MMOL/L (ref 136–145)
TSH SERPL DL<=0.005 MIU/L-ACNC: 3.28 UIU/ML (ref 0.4–4)

## 2025-01-14 PROCEDURE — 83970 ASSAY OF PARATHORMONE: CPT | Performed by: NURSE PRACTITIONER

## 2025-01-14 PROCEDURE — 99215 OFFICE O/P EST HI 40 MIN: CPT | Mod: PBBFAC | Performed by: NURSE PRACTITIONER

## 2025-01-14 PROCEDURE — G2211 COMPLEX E/M VISIT ADD ON: HCPCS | Mod: S$PBB,,, | Performed by: NURSE PRACTITIONER

## 2025-01-14 PROCEDURE — 84443 ASSAY THYROID STIM HORMONE: CPT | Performed by: NURSE PRACTITIONER

## 2025-01-14 PROCEDURE — 99999 PR PBB SHADOW E&M-EST. PATIENT-LVL V: CPT | Mod: PBBFAC,,, | Performed by: NURSE PRACTITIONER

## 2025-01-14 PROCEDURE — 99214 OFFICE O/P EST MOD 30 MIN: CPT | Mod: S$PBB,,, | Performed by: NURSE PRACTITIONER

## 2025-01-14 PROCEDURE — 86364 TISS TRNSGLTMNASE EA IG CLAS: CPT | Performed by: NURSE PRACTITIONER

## 2025-01-14 PROCEDURE — 80069 RENAL FUNCTION PANEL: CPT | Performed by: NURSE PRACTITIONER

## 2025-01-14 RX ORDER — INSULIN ASPART 100 [IU]/ML
INJECTION, SOLUTION INTRAVENOUS; SUBCUTANEOUS
Start: 2025-01-14 | End: 2026-01-14

## 2025-01-14 RX ORDER — INSULIN GLARGINE 100 [IU]/ML
5 INJECTION, SOLUTION SUBCUTANEOUS NIGHTLY
Start: 2025-01-14

## 2025-01-14 NOTE — ASSESSMENT & PLAN NOTE
-- Labs prior to follow up.  -- History of chronic pancreatitis. Low C peptide - manage as T1DM.  -- Brittle diabetic.  -- A1c goal < or = 8%.  -- Medications discussed:  SGLT2i  Reviewed potential adverse effects of SGLT-2 inhibitors, including genital mycotic infections, slightly increased risk of UTI, hypersensitivity, hypotension, and hyperkalemia. Advised to maintain water intake of 8-10 cups per day. Advised we need to check chemistry panel at baseline and 2 weeks after starting. Discussed FDA warning reports of ketoacidosis associated with SGLT-2 inhibitors. Advised to seek immediate medical attention and stop the medication if symptoms such as difficulty breathing, nausea, vomiting, abdominal pain, confusion, and unusual fatigue/sleepiness. Discussed possible precipitating factors including major illness/reduced food and fluid intake (advised to stop under these circumstances), and reduced insulin dose. Discussed possible effects of increased fracture risk/decreased bone density. Discussed reports of increased risk of leg and foot amputations with canagliflozin and need to seek urgent care if developed new pain or tenderness, sores or ulcers, or infections in legs/feet.   Insulin   -- Reviewed logs/CGM:  Instructed to send glucose logs in 7-14 days.  Reach out to me sooner for any glucose <90 or consistently >200.  Not interested in personal CGM.  -- Medication Changes:   Jardiance 10 mg daily prescribed by Cardiology  Lantus 5 units in the PM   Novolog 3-5-5 units plus correction scale before meals   Add correction scale if needed.   Blood sugar 200-250 add 1 units   Blood sugar greater than 251 add 2 units       -- Reviewed goals of therapy are to get the best control we can without hypoglycemia.  -- Reviewed patient's current insulin regimen. Clarified proper insulin dose and timing in relation to meals, etc. Insulin injection sites and proper rotation instructed.   -- Advised frequent self blood glucose  monitoring.  Patient encouraged to document glucose results and bring them to every clinic visit.  -- Hypoglycemia precautions discussed. Instructed on precautions before driving.    -- Call for Bg repeatedly < 90 or > 180.   -- Close adherence to lifestyle changes recommended.   -- Periodic follow ups for eye evaluations, foot care and dental care suggested.

## 2025-01-14 NOTE — ASSESSMENT & PLAN NOTE
-- Risks include low weight,  race, menopause, prior chronic glucocorticoid use, malabsorption.  -- Reviewed basics of quantity versus quality.  -- Recommend:  -Pharmacological therapy is recommended for patients with osteopenia if the 10 year probability of a hip fracture is >3% and 10 year probability of other major osteoporotic fractures is >20%.  Treatment options and potential side effects discussed for PO bisphosphonates, reclast, Denosumab, and Teriparatide.   -Treatment:   Actonel 8/2018 - 12/2021  She was given first dose of Prolia in 2/2018 and developed joint pains and lower ext edema so it was decided that she would not receive another dose of Prolia. We decided against Reclast for the same reason and instead restarted her on an oral bisphosphonate which she has tolerated without side effects. After her last visit 12/2021 we decided to stop the oral bisphosphonate and try Prolia again. She received Prolia  in 1/2022 without any side effects.  Last Dose: 8/2024  Continue Prolia every 6 months.  -Calcium and Vitamin D RDD provided.  -Exercise: recommended..  -Fall precautions made in the home.  -Alerted that if dental work needs to be done it should be done prior to initiating therapy. Dental health: UTD.  -- Previously reviewed BMD from 2023 noting decline - discussed switching to anabolic vs continuing (1/2024 - she declined). Now with a second hip fracture she is agreeable. Will check labs and consider adding anabolic to Prolia treatment. Discussed MOA, SE - she is concerned about potential lightheadedness. Will review case at conference.

## 2025-01-14 NOTE — PATIENT INSTRUCTIONS
Lantus 5 units in the PM   Novolog 3-5-5 units plus correction scale before meals   Add correction scale if needed.   Blood sugar 200-250 add 1 units   Blood sugar greater than 251 add 2 units     Reach out to me sooner for any glucose <90 or consistently >200.

## 2025-01-17 LAB — TTG IGA SER-ACNC: 0.8 U/ML

## 2025-01-22 ENCOUNTER — PATIENT MESSAGE (OUTPATIENT)
Dept: ENDOCRINOLOGY | Facility: CLINIC | Age: 89
End: 2025-01-22
Payer: MEDICARE

## 2025-01-22 NOTE — TELEPHONE ENCOUNTER
Osteoporosis:        Medications:   Actonel 8/2018 - 12/2021   She was given first dose of Prolia in 2/2018 and developed joint pain and lower ext edema so it was decided that she would not receive another dose of Prolia. We decided against Reclast for the same reason and instead restarted her on an oral bisphosphonate which she has tolerated without side effects. After her visit 12/2021 we decided to stop the oral bisphosphonate and try Prolia again. She received Prolia  in 1/2022 without any side effects.   Start: 2022   Last Dose: 8/2024     Falls:   11/2024 - fell getting up in the middle of the night to use the bathroom   Fractures:   11/25/24 after a mechanical fall walking to bathroom and fell on R hip and scraped R arm - S/p R femur IMN on 11/25/24.   4/2024 - fractured hip     Follows with hem/onc:  Patient with pulmonary carcinoid tumor and colon adenocarcinoma status post right hemicolectomy.     Previously reviewed BMD from 2023 noting decline - discussed switching to anabolic vs continuing (1/2024 - she declined). Now with a second hip fracture she is agreeable.     Elevated PTH but could be due to Prolia. She's had fluctuating calcium levels but unsure if it's related to comorbidities, etc.     Would benefit from anabolic.  - STOP Prolia and START Evenity for 1 year then RESTART Prolia  - CONTINUE Prolia and START Forteo     Reviewed case with Dr Romero.

## 2025-01-27 DIAGNOSIS — I51.89 DIASTOLIC DYSFUNCTION: Chronic | ICD-10-CM

## 2025-01-27 DIAGNOSIS — I50.32 CHRONIC HEART FAILURE WITH PRESERVED EJECTION FRACTION (HFPEF): ICD-10-CM

## 2025-01-27 DIAGNOSIS — I10 PRIMARY HYPERTENSION: Chronic | ICD-10-CM

## 2025-01-27 RX ORDER — LOSARTAN POTASSIUM 100 MG/1
100 TABLET ORAL DAILY
Qty: 90 TABLET | Refills: 3 | Status: SHIPPED | OUTPATIENT
Start: 2025-01-27 | End: 2026-01-27

## 2025-01-28 ENCOUNTER — TELEPHONE (OUTPATIENT)
Dept: INTERNAL MEDICINE | Facility: CLINIC | Age: 89
End: 2025-01-28
Payer: MEDICARE

## 2025-01-28 ENCOUNTER — PATIENT MESSAGE (OUTPATIENT)
Dept: ORTHOPEDICS | Facility: CLINIC | Age: 89
End: 2025-01-28
Payer: MEDICARE

## 2025-01-28 DIAGNOSIS — N18.31 TYPE 2 DIABETES MELLITUS WITH STAGE 3A CHRONIC KIDNEY DISEASE, WITH LONG-TERM CURRENT USE OF INSULIN: Primary | ICD-10-CM

## 2025-01-28 DIAGNOSIS — E11.22 TYPE 2 DIABETES MELLITUS WITH STAGE 3A CHRONIC KIDNEY DISEASE, WITH LONG-TERM CURRENT USE OF INSULIN: Primary | ICD-10-CM

## 2025-01-28 DIAGNOSIS — Z79.4 TYPE 2 DIABETES MELLITUS WITH STAGE 3A CHRONIC KIDNEY DISEASE, WITH LONG-TERM CURRENT USE OF INSULIN: Primary | ICD-10-CM

## 2025-01-28 DIAGNOSIS — S72.141D CLOSED DISPLACED INTERTROCHANTERIC FRACTURE OF RIGHT FEMUR WITH ROUTINE HEALING, SUBSEQUENT ENCOUNTER: Primary | ICD-10-CM

## 2025-01-28 RX ORDER — BLOOD SUGAR DIAGNOSTIC
STRIP MISCELLANEOUS
Qty: 100 STRIP | Refills: 11 | Status: SHIPPED | OUTPATIENT
Start: 2025-01-28 | End: 2025-01-29 | Stop reason: SDUPTHER

## 2025-01-28 RX ORDER — TRAMADOL HYDROCHLORIDE 50 MG/1
50 TABLET ORAL EVERY 12 HOURS PRN
Qty: 14 EACH | Refills: 0 | Status: SHIPPED | OUTPATIENT
Start: 2025-01-28 | End: 2025-02-04

## 2025-01-28 NOTE — PROGRESS NOTES
Patient regarding her message in the portal.  Patient reports she is noticing some increased pain to her knee and hip mostly at nighttime.  She has been using Tylenol what she finds is not given her complete relief.  She has an allergy to NSAIDs.  I will give her a short supply of tramadol to use when the pain is severe.  Recommend she continue taking the Tylenol for mild-to-moderate pain.  She also inquired about changing over to Evenity at the recommendation of her endocrinologist.  I told her that she would be okay.

## 2025-01-29 ENCOUNTER — OFFICE VISIT (OUTPATIENT)
Dept: INTERNAL MEDICINE | Facility: CLINIC | Age: 89
End: 2025-01-29
Payer: MEDICARE

## 2025-01-29 ENCOUNTER — LAB VISIT (OUTPATIENT)
Dept: LAB | Facility: HOSPITAL | Age: 89
End: 2025-01-29
Payer: MEDICARE

## 2025-01-29 VITALS
DIASTOLIC BLOOD PRESSURE: 64 MMHG | SYSTOLIC BLOOD PRESSURE: 112 MMHG | BODY MASS INDEX: 19.21 KG/M2 | HEART RATE: 70 BPM | TEMPERATURE: 98 F | RESPIRATION RATE: 14 BRPM | WEIGHT: 115.31 LBS | OXYGEN SATURATION: 96 % | HEIGHT: 65 IN

## 2025-01-29 DIAGNOSIS — I70.0 AORTIC ATHEROSCLEROSIS: Chronic | ICD-10-CM

## 2025-01-29 DIAGNOSIS — E11.22 TYPE 2 DIABETES MELLITUS WITH STAGE 3A CHRONIC KIDNEY DISEASE, WITH LONG-TERM CURRENT USE OF INSULIN: Chronic | ICD-10-CM

## 2025-01-29 DIAGNOSIS — E11.69 HYPERLIPIDEMIA ASSOCIATED WITH TYPE 2 DIABETES MELLITUS: Chronic | ICD-10-CM

## 2025-01-29 DIAGNOSIS — E78.5 HYPERLIPIDEMIA ASSOCIATED WITH TYPE 2 DIABETES MELLITUS: Chronic | ICD-10-CM

## 2025-01-29 DIAGNOSIS — N18.32 STAGE 3B CHRONIC KIDNEY DISEASE: ICD-10-CM

## 2025-01-29 DIAGNOSIS — I48.0 PAROXYSMAL ATRIAL FIBRILLATION: Chronic | ICD-10-CM

## 2025-01-29 DIAGNOSIS — N18.31 TYPE 2 DIABETES MELLITUS WITH STAGE 3A CHRONIC KIDNEY DISEASE, WITH LONG-TERM CURRENT USE OF INSULIN: Chronic | ICD-10-CM

## 2025-01-29 DIAGNOSIS — R80.9 TYPE 2 DIABETES MELLITUS WITH MICROALBUMINURIA, WITHOUT LONG-TERM CURRENT USE OF INSULIN: Chronic | ICD-10-CM

## 2025-01-29 DIAGNOSIS — E11.22 TYPE 2 DIABETES MELLITUS WITH STAGE 3A CHRONIC KIDNEY DISEASE, WITH LONG-TERM CURRENT USE OF INSULIN: ICD-10-CM

## 2025-01-29 DIAGNOSIS — Z79.4 TYPE 2 DIABETES MELLITUS WITH STAGE 3A CHRONIC KIDNEY DISEASE, WITH LONG-TERM CURRENT USE OF INSULIN: Chronic | ICD-10-CM

## 2025-01-29 DIAGNOSIS — C7A.090 CARCINOID BRONCHIAL ADENOMA, RIGHT: ICD-10-CM

## 2025-01-29 DIAGNOSIS — I51.89 DIASTOLIC DYSFUNCTION: Chronic | ICD-10-CM

## 2025-01-29 DIAGNOSIS — I50.32 CHRONIC HEART FAILURE WITH PRESERVED EJECTION FRACTION (HFPEF): Chronic | ICD-10-CM

## 2025-01-29 DIAGNOSIS — J45.40 MODERATE PERSISTENT ASTHMA WITHOUT COMPLICATION: Chronic | ICD-10-CM

## 2025-01-29 DIAGNOSIS — E11.29 TYPE 2 DIABETES MELLITUS WITH MICROALBUMINURIA, WITHOUT LONG-TERM CURRENT USE OF INSULIN: Chronic | ICD-10-CM

## 2025-01-29 DIAGNOSIS — N18.31 TYPE 2 DIABETES MELLITUS WITH STAGE 3A CHRONIC KIDNEY DISEASE, WITH LONG-TERM CURRENT USE OF INSULIN: ICD-10-CM

## 2025-01-29 DIAGNOSIS — H40.1131 PRIMARY OPEN ANGLE GLAUCOMA (POAG) OF BOTH EYES, MILD STAGE: ICD-10-CM

## 2025-01-29 DIAGNOSIS — Z79.4 TYPE 2 DIABETES MELLITUS WITH STAGE 3A CHRONIC KIDNEY DISEASE, WITH LONG-TERM CURRENT USE OF INSULIN: ICD-10-CM

## 2025-01-29 DIAGNOSIS — R09.82 POST-NASAL DRIP: ICD-10-CM

## 2025-01-29 DIAGNOSIS — K86.1 OTHER CHRONIC PANCREATITIS: ICD-10-CM

## 2025-01-29 DIAGNOSIS — I10 PRIMARY HYPERTENSION: Primary | Chronic | ICD-10-CM

## 2025-01-29 LAB
ALBUMIN SERPL BCP-MCNC: 3.6 G/DL (ref 3.5–5.2)
ALP SERPL-CCNC: 98 U/L (ref 40–150)
ALT SERPL W/O P-5'-P-CCNC: 22 U/L (ref 10–44)
ANION GAP SERPL CALC-SCNC: 8 MMOL/L (ref 8–16)
AST SERPL-CCNC: 22 U/L (ref 10–40)
BILIRUB SERPL-MCNC: 0.4 MG/DL (ref 0.1–1)
BUN SERPL-MCNC: 20 MG/DL (ref 8–23)
CALCIUM SERPL-MCNC: 10.2 MG/DL (ref 8.7–10.5)
CHLORIDE SERPL-SCNC: 110 MMOL/L (ref 95–110)
CO2 SERPL-SCNC: 25 MMOL/L (ref 23–29)
CREAT SERPL-MCNC: 1.1 MG/DL (ref 0.5–1.4)
EST. GFR  (NO RACE VARIABLE): 48.3 ML/MIN/1.73 M^2
GLUCOSE SERPL-MCNC: 216 MG/DL (ref 70–110)
POTASSIUM SERPL-SCNC: 3.4 MMOL/L (ref 3.5–5.1)
PROT SERPL-MCNC: 6.7 G/DL (ref 6–8.4)
SODIUM SERPL-SCNC: 143 MMOL/L (ref 136–145)

## 2025-01-29 PROCEDURE — 99214 OFFICE O/P EST MOD 30 MIN: CPT | Mod: S$PBB,,,

## 2025-01-29 PROCEDURE — 36415 COLL VENOUS BLD VENIPUNCTURE: CPT | Mod: PO

## 2025-01-29 PROCEDURE — 99215 OFFICE O/P EST HI 40 MIN: CPT | Mod: PBBFAC,PO

## 2025-01-29 PROCEDURE — 99999 PR PBB SHADOW E&M-EST. PATIENT-LVL V: CPT | Mod: PBBFAC,,,

## 2025-01-29 PROCEDURE — 80053 COMPREHEN METABOLIC PANEL: CPT

## 2025-01-29 RX ORDER — BLOOD SUGAR DIAGNOSTIC
STRIP MISCELLANEOUS
Qty: 100 STRIP | Refills: 11 | Status: SHIPPED | OUTPATIENT
Start: 2025-01-29

## 2025-01-29 RX ORDER — FLUTICASONE PROPIONATE 50 MCG
1 SPRAY, SUSPENSION (ML) NASAL DAILY
Qty: 16 G | Refills: 0 | Status: SHIPPED | OUTPATIENT
Start: 2025-01-29

## 2025-01-29 NOTE — PROGRESS NOTES
Primary Rx - [facility];     [facility]: Lantus Solostar U-100 insulin 100 unit/ mL (3mL) InjPen; insulin aspart U-100 100 unit/mL (3mL) Inpn; Trelegy Ellipta 200-62.5-25mcg inhaler; Novolog Flexpen; Creon [id number]

## 2025-01-29 NOTE — PROGRESS NOTES
"Subjective:       Patient ID: Sussy Costa is a 89 y.o. female.    Chief Complaint: Follow-Up    Follow-up  Associated symptoms include coughing.   Cough        History of Present Illness    CHIEF COMPLAINT:  Ms. Costa presents today for follow up of leg swelling.     CURRENT SYMPTOMS:  She reports experiencing a sore throat with cough and is uncertain if these symptoms indicate an oncoming cold. She reports developing runny nose with post nasal drip on Sunday. She reports otherwise feeling fine. Denies fever or known sick contacts.    LEG SWELLING:  At time of last visit patient was postop from repair of right femur fracture.  After discharge home patient reported increased swelling to bilateral lower extremities.  She was placed on short regimen of Lasix.  Today patient reports significant improvement in leg swelling stating "I do not feel like I have any swelling at all now". She is no longer taking the Lasix. No edema noted to BLE during exam.       CURRENT MEDICATIONS:  She takes Losartan 100mg for hypertension, Jardiance 10mg for heart failure and diabetes. Her diabetes regimen includes Lantus 5 units nightly and Novolog before meals with sliding scale. For respiratory management, she uses Albuterol inhaler as needed, Trelegy inhaler daily in morning with good response, and Singulair nightly. She also uses Latanoprost eye drops nightly for glaucoma.      ROS:  ENT: +sore throat  Respiratory: +cough, +post nasal drip    Objective:      Physical Exam  Constitutional:       General: She is not in acute distress.     Appearance: Normal appearance. She is not ill-appearing, toxic-appearing or diaphoretic.   HENT:      Head: Normocephalic and atraumatic.      Nose: Rhinorrhea present. Rhinorrhea is clear.      Mouth/Throat:      Mouth: Mucous membranes are moist.      Pharynx: Postnasal drip present. No oropharyngeal exudate.   Eyes:      General: No scleral icterus.     Conjunctiva/sclera: Conjunctivae normal. "      Pupils: Pupils are equal, round, and reactive to light.   Cardiovascular:      Rate and Rhythm: Normal rate and regular rhythm.      Pulses: Normal pulses.           Radial pulses are 2+ on the right side and 2+ on the left side.        Dorsalis pedis pulses are 2+ on the right side and 2+ on the left side.      Heart sounds: Normal heart sounds.   Pulmonary:      Effort: Pulmonary effort is normal. No respiratory distress.      Breath sounds: Normal breath sounds. No wheezing, rhonchi or rales.   Musculoskeletal:         General: Normal range of motion.      Cervical back: Normal range of motion and neck supple.      Right lower leg: No edema.      Left lower leg: No edema.      Comments: Utilizing walker for ambulation.    Skin:     General: Skin is warm and dry.      Capillary Refill: Capillary refill takes less than 2 seconds.   Neurological:      Mental Status: She is alert and oriented to person, place, and time. Mental status is at baseline.      GCS: GCS eye subscore is 4. GCS verbal subscore is 5. GCS motor subscore is 6.           Physical Exam            Assessment:       1. Primary hypertension    2. Paroxysmal atrial fibrillation    3. Chronic heart failure with preserved ejection fraction (HFpEF)  - Comprehensive Metabolic Panel; Future    4. Diastolic dysfunction    5. Hyperlipidemia associated with type 2 diabetes mellitus    6. Aortic atherosclerosis    7. Moderate persistent asthma without complication    8. Type 2 diabetes mellitus with microalbuminuria, without long-term current use of insulin    9. Type 2 diabetes mellitus with stage 3a chronic kidney disease, with long-term current use of insulin    10. 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 Other chronic pancreatitis    13. Primary open angle glaucoma (POAG) of both eyes, mild stage    14. Post-nasal drip  - fluticasone propionate (FLONASE) 50 mcg/actuation nasal spray; 1 spray (50 mcg total) by  Each Nostril route once daily.  Dispense: 16 g; Refill: 0      Plan:         Assessment & Plan    HYPERTENSION:  - Continue Losartan 100 mg daily for hypertension management.  - Monitor blood pressure, which is currently well-maintained with Losartan.  - Instruct the patient to adhere to the prescribed medication regimen.    ATRIAL FIBRILLATION:  -Controlled.  - Note that the patient was previously on anticoagulants for atrial fibrillation but is no longer taking them.      HEART FAILURE/DIASTOLIC DYSFUNCTION:  - Continue Jardiance 10 mg daily for heart failure management.  - Note that the patient previously discontinued Furosemide 40 mg twice daily due to leg edema, which has since resolved.  - Observe minimal edema on exam, deemed non-critical.  - Recommend compression stockings to improve venous return and reduce edema.  - Monitor for any signs of worsening heart failure symptoms.    HYPERLIPIDEMIA:  - Continue Atorvastatin 20 mg nightly for hyperlipidemia management.  - Monitor lipid levels periodically to assess treatment efficacy.    AORTIC ATHEROSCLEROSIS:  - Continue Atorvastatin 20 mg nightly for aortic atherosclerosis management.    ASTHMA:  - Continue Trelegy inhaler daily in the morning for asthma management.  - Continue Montelukast nightly for asthma control.  - Note that the patient reports no asthma symptoms since initiating Trelegy inhaler.  - Instruct the patient to use albuterol inhaler as needed for breakthrough symptoms.  - Monitor asthma control and adjust treatment plan if necessary.    CHRONIC KIDNEY DISEASE:  - Emphasize the importance of avoiding nephrotoxic medications.  - Note that labs from January 6th showed stable kidney function.  - Schedule follow-up to review labs results and reassess kidney function.    DIABETES:  - Continue Jardiance 10 mg daily for diabetes management.  - Continue insulin therapy: Lantus 5 units at night, Novolog before meals with sliding scale as needed.  -  Monitor glucose levels and adjust insulin regimen if necessary.      BRONCHIAL ADENOMA:  - Note that the patient is being followed by pulmonology for right-sided bronchial adenoma.  - Continue current plan of care for bronchial adenoma including daily Trelegy use..  - Coordinate with pulmonology for any necessary follow-up or interventions.    OPEN-ANGLE GLAUCOMA:  - Continue Latanoprost eye drops nightly for open-angle glaucoma management.  - Recommend regular follow-up with ophthalmology for glaucoma monitoring.    MOBILITY:  - Note that the patient primarily uses a walker for ambulation.  - Note that the patient is currently undergoing physical therapy following hip surgery.  - Continue physical therapy 3 times per week, with plans to decrease to 2 times per week.  - Monitor progress and adjust therapy plan as needed.    OTHER INSTRUCTIONS:  - Discussed the benefits of compression socks for venous return and reducing leg swelling.  - Ms. Subramanianl to wear compression socks to help with venous return and decrease leg swelling, if needed.    POST NASAL DRIP:  - Provided information on supportive measures for sore throat, including hot tea with honey and warm salt water gargle.  - Started Flonase nasal spray: 1 spray in each nostril every morning.    FOLLOW UP:  - Follow up in 3 months.  - Contact the office if any concerns arise before the scheduled follow-up.            This note was generated with the assistance of ambient listening technology. Verbal consent was obtained by the patient and accompanying visitor(s) for the recording of patient appointment to facilitate this note. I attest to having reviewed and edited the generated note for accuracy, though some syntax or spelling errors may persist. Please contact the author of this note for any clarification.

## 2025-01-30 ENCOUNTER — TELEPHONE (OUTPATIENT)
Dept: INTERNAL MEDICINE | Facility: CLINIC | Age: 89
End: 2025-01-30
Payer: MEDICARE

## 2025-01-30 DIAGNOSIS — N18.30 STAGE 3 CHRONIC KIDNEY DISEASE, UNSPECIFIED WHETHER STAGE 3A OR 3B CKD: Primary | ICD-10-CM

## 2025-01-30 NOTE — TELEPHONE ENCOUNTER
Cough - believes it went into her chest; pt believes she can hear it coming from her chest.  Wants to know what to take. Mucinex? Other medications?

## 2025-01-30 NOTE — TELEPHONE ENCOUNTER
She had runny nose yesterday during her visit. Cough is most likely due to post nasal drip. I would recommend OTC Flonase and Astelin nasal sprays to help alleviate post nasal drip which will most likely decrease her cough. Additionally, stay well hydrated, use cool mist humidifier in room, and she may also use nasal saline rinse.    RM

## 2025-01-30 NOTE — TELEPHONE ENCOUNTER
Pt advised, that her kidney function has decreased from 1 month ago.  This is likely due to dehydration. I instructed her to increase her water intake until Monday. Pt scheduled on Monday

## 2025-01-30 NOTE — TELEPHONE ENCOUNTER
Please notify patient that her kidney function has decreased from 1 month ago.  This is likely due to dehydration.  Please instruct her to increase her water intake until Monday.  On Monday we will recheck her blood work to evaluate her kidney function.    Please let me know if she has any questions.    Thanks,  RM

## 2025-01-30 NOTE — TELEPHONE ENCOUNTER
"Informed pt that: "She had runny nose yesterday during her visit. Cough is most likely due to post nasal drip. I would recommend OTC Flonase and Astelin nasal sprays to help alleviate post nasal drip which will most likely decrease her cough. Additionally, stay well hydrated, use cool mist humidifier in room, and she may also use nasal saline rinse."  & pt has had significant cardiac events, Mucinex may exacerbate it.  Pt vu.  "

## 2025-02-03 ENCOUNTER — LAB VISIT (OUTPATIENT)
Dept: LAB | Facility: HOSPITAL | Age: 89
End: 2025-02-03
Payer: MEDICARE

## 2025-02-03 DIAGNOSIS — N18.30 STAGE 3 CHRONIC KIDNEY DISEASE, UNSPECIFIED WHETHER STAGE 3A OR 3B CKD: ICD-10-CM

## 2025-02-03 LAB
ALBUMIN SERPL BCP-MCNC: 3.5 G/DL (ref 3.5–5.2)
ALP SERPL-CCNC: 83 U/L (ref 40–150)
ALT SERPL W/O P-5'-P-CCNC: 12 U/L (ref 10–44)
ANION GAP SERPL CALC-SCNC: 14 MMOL/L (ref 8–16)
AST SERPL-CCNC: 18 U/L (ref 10–40)
BILIRUB SERPL-MCNC: 0.4 MG/DL (ref 0.1–1)
BUN SERPL-MCNC: 20 MG/DL (ref 8–23)
CALCIUM SERPL-MCNC: 10 MG/DL (ref 8.7–10.5)
CHLORIDE SERPL-SCNC: 111 MMOL/L (ref 95–110)
CO2 SERPL-SCNC: 21 MMOL/L (ref 23–29)
CREAT SERPL-MCNC: 1 MG/DL (ref 0.5–1.4)
EST. GFR  (NO RACE VARIABLE): 54.2 ML/MIN/1.73 M^2
GLUCOSE SERPL-MCNC: 85 MG/DL (ref 70–110)
POTASSIUM SERPL-SCNC: 3.2 MMOL/L (ref 3.5–5.1)
PROT SERPL-MCNC: 6.4 G/DL (ref 6–8.4)
SODIUM SERPL-SCNC: 146 MMOL/L (ref 136–145)

## 2025-02-03 PROCEDURE — 80053 COMPREHEN METABOLIC PANEL: CPT

## 2025-02-03 PROCEDURE — 36415 COLL VENOUS BLD VENIPUNCTURE: CPT

## 2025-02-04 ENCOUNTER — TELEPHONE (OUTPATIENT)
Dept: INTERNAL MEDICINE | Facility: CLINIC | Age: 89
End: 2025-02-04
Payer: MEDICARE

## 2025-02-04 DIAGNOSIS — E87.6 HYPOKALEMIA: Primary | ICD-10-CM

## 2025-02-04 RX ORDER — POTASSIUM CHLORIDE 20 MEQ/1
20 TABLET, EXTENDED RELEASE ORAL DAILY
Qty: 5 TABLET | Refills: 0 | Status: SHIPPED | OUTPATIENT
Start: 2025-02-04 | End: 2025-02-09

## 2025-02-04 NOTE — TELEPHONE ENCOUNTER
Pt would like to use [facility] in [place]; Pt asking if she needs to go back to lab after getting labs done on 1/29 & 2/3.     Pt to get labs done after weekly tx of potassium, correct?

## 2025-02-04 NOTE — TELEPHONE ENCOUNTER
Yes, she will have repeat blood work after completion of the prescribed potassium regimen. I will send the prescription now.

## 2025-02-04 NOTE — TELEPHONE ENCOUNTER
Please clarify with patient which local pharmacy she would like to utilize for her potassium supplementation.    Thanks,  RM

## 2025-02-07 ENCOUNTER — TELEPHONE (OUTPATIENT)
Dept: INTERNAL MEDICINE | Facility: CLINIC | Age: 89
End: 2025-02-07
Payer: MEDICARE

## 2025-02-07 NOTE — TELEPHONE ENCOUNTER
"----- Message from Luisa sent at 2/7/2025  9:43 AM CST -----  Contact: Patient   [phone]  type: Lab     Finished POTASSIUM    Caller is requesting to schedule their Lab appointment prior to an appointment.    Order is not listed in EPIC.  Please enter order and contact patient to schedule.    Preferred Date and Time of Labs:2/10/2025   [facility]  10-10:30        Where would they like the lab performed?[facility]    Would the patient rather a call back or a response via [facility]? portal    Best Call Back Number:Patient   [phone]    Additional Information:    "Do not send messages requesting lab orders prior to Physical appt on these providers: Hesham Newberry Giambrone,  Keke Don and [facility]."  "
Sent pt a message in [employer] re: sukumar scheduled for 2/10/25 @3938n  
Spine appears normal, range of motion is not limited, no muscle or joint tenderness

## 2025-02-10 ENCOUNTER — LAB VISIT (OUTPATIENT)
Dept: LAB | Facility: HOSPITAL | Age: 89
End: 2025-02-10
Attending: INTERNAL MEDICINE
Payer: MEDICARE

## 2025-02-10 DIAGNOSIS — R74.8 ELEVATED ALKALINE PHOSPHATASE LEVEL: ICD-10-CM

## 2025-02-10 LAB
ALBUMIN SERPL BCP-MCNC: 3.5 G/DL (ref 3.5–5.2)
ALP SERPL-CCNC: 87 U/L (ref 40–150)
ALT SERPL W/O P-5'-P-CCNC: 17 U/L (ref 10–44)
ANION GAP SERPL CALC-SCNC: 7 MMOL/L (ref 8–16)
AST SERPL-CCNC: 19 U/L (ref 10–40)
BILIRUB SERPL-MCNC: 0.4 MG/DL (ref 0.1–1)
BUN SERPL-MCNC: 24 MG/DL (ref 8–23)
CALCIUM SERPL-MCNC: 9.9 MG/DL (ref 8.7–10.5)
CHLORIDE SERPL-SCNC: 113 MMOL/L (ref 95–110)
CO2 SERPL-SCNC: 22 MMOL/L (ref 23–29)
CREAT SERPL-MCNC: 1 MG/DL (ref 0.5–1.4)
EST. GFR  (NO RACE VARIABLE): 54.2 ML/MIN/1.73 M^2
GLUCOSE SERPL-MCNC: 139 MG/DL (ref 70–110)
POTASSIUM SERPL-SCNC: 4.1 MMOL/L (ref 3.5–5.1)
PROT SERPL-MCNC: 6.5 G/DL (ref 6–8.4)
SODIUM SERPL-SCNC: 142 MMOL/L (ref 136–145)

## 2025-02-10 PROCEDURE — 36415 COLL VENOUS BLD VENIPUNCTURE: CPT | Performed by: INTERNAL MEDICINE

## 2025-02-10 PROCEDURE — 80053 COMPREHEN METABOLIC PANEL: CPT | Performed by: INTERNAL MEDICINE

## 2025-02-12 ENCOUNTER — PATIENT MESSAGE (OUTPATIENT)
Dept: ENDOCRINOLOGY | Facility: CLINIC | Age: 89
End: 2025-02-12
Payer: MEDICARE

## 2025-02-17 RX ORDER — INSULIN ASPART 100 [IU]/ML
INJECTION, SOLUTION INTRAVENOUS; SUBCUTANEOUS
Qty: 3.9 ML | Refills: 11 | Status: SHIPPED | OUTPATIENT
Start: 2025-02-17 | End: 2026-02-17

## 2025-02-17 NOTE — TELEPHONE ENCOUNTER
----- Message from Acea sent at 2/17/2025 12:10 PM CST -----  Regarding: Refill  Contact: [phone]  Type:  RX Refill RequestWho Called: Sussy Costa Refill or New Rx: Refill RX Name and Strength: Insulin aspart U-100 (NOVOLOG FLEXPEN U-100 INSULIN) 100 unit/mL (3 mL) InPn penPreferred Pharmacy with phone number: [facility] - TEREZA Mcmanus - [address] [facility] AT Portal to Registered [facility] [place] [address]Exqdt: [phone] Fax: [phone] Local or Mail Order: Local Ordering Provider:  Would the patient rather a call back or a response via [facility]? Call Best Call Back Number: [phone]

## 2025-02-18 ENCOUNTER — HOSPITAL ENCOUNTER (OUTPATIENT)
Dept: RADIOLOGY | Facility: HOSPITAL | Age: 89
Discharge: HOME | End: 2025-02-18
Attending: NURSE PRACTITIONER
Payer: MEDICARE

## 2025-02-18 ENCOUNTER — OFFICE VISIT (OUTPATIENT)
Dept: ORTHOPEDICS | Facility: CLINIC | Age: 89
End: 2025-02-18
Payer: MEDICARE

## 2025-02-18 DIAGNOSIS — S72.141D CLOSED DISPLACED INTERTROCHANTERIC FRACTURE OF RIGHT FEMUR WITH ROUTINE HEALING, SUBSEQUENT ENCOUNTER: Primary | ICD-10-CM

## 2025-02-18 DIAGNOSIS — R52 PAIN: Primary | ICD-10-CM

## 2025-02-18 DIAGNOSIS — R52 PAIN: ICD-10-CM

## 2025-02-18 DIAGNOSIS — Z98.890 POST-OPERATIVE STATE: ICD-10-CM

## 2025-02-18 PROCEDURE — 72170 X-RAY EXAM OF PELVIS: CPT | Mod: TC

## 2025-02-18 PROCEDURE — 73552 X-RAY EXAM OF FEMUR 2/>: CPT | Mod: TC,RT

## 2025-02-18 PROCEDURE — 73552 X-RAY EXAM OF FEMUR 2/>: CPT | Mod: 26,RT,, | Performed by: RADIOLOGY

## 2025-02-18 PROCEDURE — 99214 OFFICE O/P EST MOD 30 MIN: CPT | Mod: PBBFAC,25 | Performed by: NURSE PRACTITIONER

## 2025-02-18 NOTE — PROGRESS NOTES
Mrs. Costa is here today for a post-operative visit after undergoing the following:      ICD-10-CM ICD-9-CM   1. Closed displaced intertrochanteric fracture of right femur with routine healing, subsequent encounter  S72.141D V54.13   2. Post-operative state  Z98.890 V45.89       by Dr. Blunt on 11/25/2024.    Interval History:  She reports that she is doing good.  She states their pain is good.  She is not taking pain medication.  She reports she tried the tramadol but it made her too sleepy and now only uses Tylenol.  She reports she still has occasional pain to her right thigh that radiates to lateral knee.  She is walking with a walker.  She is currently  home going to outpatient therapy at [place].     She denies any falls or injuries since surgery/last seen in the clinic.  She denies fever, chills, and sweats since the time of the surgery.     Physical exam:  The patient's incision is open to air and healed.  No signs of infection noted.  She has tactile stimulation to their lower leg, she denies calf pain, there is no leg edema and their pedal pulse is palpable x 2.  No pain with axial loading, no pain with log roll.  Hip range of motion 0-90 degrees.  Knee ROM is 0-100 degrees.    RADS: right Femur and pelvis xray was obtained, findings show IM nailing appears to be in good position and alignment.  Femoral neck fracture appears to be stable.  There is callus formation when compared to prior xray No evidence of hardware failure or new fracture seen.    Assessment:  Post-op visit (12 weeks)    Plan:    ICD-10-CM ICD-9-CM   1. Closed displaced intertrochanteric fracture of right femur with routine healing, subsequent encounter  S72.141D V54.13   2. Post-operative state  Z98.890 V45.89     Current care, treatment plan, precautions, activity level/ modifications, limitations, rehabilitation exercises and proposed future treatment were discussed with the patient. We discussed the need to monitor for changes  in symptoms and condition and report them to the physician.  Discussed importance of compliance with all appointments and follow up examinations.       PHYSICAL THERAPY:   - Physical therapy as ordered.   - Weight bearing as tolerated   - Range of motion as tolerated.      PAIN MEDICATION:   - Pain medication: refill was not needed.  - Pain medication refill policy provided to patient for review, yes.    - Patient was informed a multi-modal approach is used to treat their pain.     FRAGILITY:  - Patient will be transitioning to Evenity per endocrine recommendation.     FOLLOW UP:   - Patient will follow up in the clinic in 12 weeks.  - X-ray of her right femur and pelvis is needed.    - Future Appointments:   Future Appointments   Date Time Provider Department Center   2/20/2025 11:45 AM INJECTION NOM AMB INF [facility]   2/27/2025  2:00 PM Alen Campa DPM [facility] POD [facility]   4/9/2025  2:00 PM LAB, HEMONC CANCER BLDG [facility] LAB HO [facility]   4/10/2025  2:30 PM Damaris Valadez CNS [facility] HEMONC[facility]   4/23/2025  1:20 PM Leon Ramírez MD [facility] IM [facility]   5/21/2025 10:30 AM Heike Ye, GHISLAINE [facility] ENDODIA Mark[facility]co   6/20/2025  1:20 PM Leon Ramírez MD [facility] IM [facility]         If there are any questions prior to scheduled follow up, the patient was instructed to contact the office

## 2025-02-20 ENCOUNTER — INFUSION (OUTPATIENT)
Dept: INFECTIOUS DISEASES | Facility: HOSPITAL | Age: 89
End: 2025-02-20
Attending: NURSE PRACTITIONER
Payer: MEDICARE

## 2025-02-20 VITALS
RESPIRATION RATE: 20 BRPM | DIASTOLIC BLOOD PRESSURE: 81 MMHG | SYSTOLIC BLOOD PRESSURE: 190 MMHG | TEMPERATURE: 98 F | BODY MASS INDEX: 18.61 KG/M2 | WEIGHT: 111.69 LBS | HEIGHT: 65 IN | HEART RATE: 75 BPM | OXYGEN SATURATION: 96 %

## 2025-02-20 DIAGNOSIS — M81.0 SENILE OSTEOPOROSIS: Primary | ICD-10-CM

## 2025-02-20 PROCEDURE — 96372 THER/PROPH/DIAG INJ SC/IM: CPT

## 2025-02-20 PROCEDURE — 63600175 PHARM REV CODE 636 W HCPCS: Mod: JZ,TB | Performed by: NURSE PRACTITIONER

## 2025-02-20 RX ADMIN — ROMOSOZUMAB-AQQG 210 MG: 105 INJECTION, SOLUTION SUBCUTANEOUS at 12:02

## 2025-02-20 NOTE — PROGRESS NOTES
Patient arrives for Evenity (2 separate injections) injection - confirms use of vitamin D supplements and denies dental procedures over past 3 months - administered per guidelines to Bilateral arms.     Limited head-to-toe assessment due to privacy issues and visit reason though the opportunity was given for patient to express any concerns      Pt observed for 15 mins after injection. Next appt scheduled, pt made aware.

## 2025-02-21 ENCOUNTER — PATIENT MESSAGE (OUTPATIENT)
Dept: ENDOCRINOLOGY | Facility: CLINIC | Age: 89
End: 2025-02-21
Payer: MEDICARE

## 2025-02-22 DIAGNOSIS — E11.22 TYPE 2 DIABETES MELLITUS WITH STAGE 3A CHRONIC KIDNEY DISEASE, WITH LONG-TERM CURRENT USE OF INSULIN: Primary | ICD-10-CM

## 2025-02-22 DIAGNOSIS — N18.31 TYPE 2 DIABETES MELLITUS WITH STAGE 3A CHRONIC KIDNEY DISEASE, WITH LONG-TERM CURRENT USE OF INSULIN: Primary | ICD-10-CM

## 2025-02-22 DIAGNOSIS — Z79.4 TYPE 2 DIABETES MELLITUS WITH STAGE 3A CHRONIC KIDNEY DISEASE, WITH LONG-TERM CURRENT USE OF INSULIN: Primary | ICD-10-CM

## 2025-02-24 RX ORDER — PEN NEEDLE, DIABETIC 31 GX5/16"
NEEDLE, DISPOSABLE MISCELLANEOUS
Qty: 200 EACH | Refills: 11 | Status: SHIPPED | OUTPATIENT
Start: 2025-02-24

## 2025-02-27 ENCOUNTER — OFFICE VISIT (OUTPATIENT)
Dept: PODIATRY | Facility: CLINIC | Age: 89
End: 2025-02-27
Payer: MEDICARE

## 2025-02-27 VITALS
HEIGHT: 65 IN | SYSTOLIC BLOOD PRESSURE: 160 MMHG | BODY MASS INDEX: 18.62 KG/M2 | HEART RATE: 69 BPM | WEIGHT: 111.75 LBS | DIASTOLIC BLOOD PRESSURE: 68 MMHG

## 2025-02-27 DIAGNOSIS — L84 CORN OR CALLUS: ICD-10-CM

## 2025-02-27 DIAGNOSIS — B35.1 ONYCHOMYCOSIS DUE TO DERMATOPHYTE: ICD-10-CM

## 2025-02-27 DIAGNOSIS — L60.0 INGROWN NAIL: ICD-10-CM

## 2025-02-27 DIAGNOSIS — E11.49 TYPE II DIABETES MELLITUS WITH NEUROLOGICAL MANIFESTATIONS: Primary | ICD-10-CM

## 2025-02-27 PROCEDURE — 11721 DEBRIDE NAIL 6 OR MORE: CPT | Mod: Q9,59,S$PBB, | Performed by: PODIATRIST

## 2025-02-27 PROCEDURE — 11056 PARNG/CUTG B9 HYPRKR LES 2-4: CPT | Mod: Q9,S$PBB,, | Performed by: PODIATRIST

## 2025-02-27 PROCEDURE — 11056 PARNG/CUTG B9 HYPRKR LES 2-4: CPT | Mod: PBBFAC | Performed by: PODIATRIST

## 2025-02-27 PROCEDURE — 99999 PR PBB SHADOW E&M-EST. PATIENT-LVL IV: CPT | Mod: PBBFAC,,, | Performed by: PODIATRIST

## 2025-02-27 PROCEDURE — 99214 OFFICE O/P EST MOD 30 MIN: CPT | Mod: PBBFAC,25 | Performed by: PODIATRIST

## 2025-02-27 PROCEDURE — 99213 OFFICE O/P EST LOW 20 MIN: CPT | Mod: 25,S$PBB,, | Performed by: PODIATRIST

## 2025-02-27 PROCEDURE — 11721 DEBRIDE NAIL 6 OR MORE: CPT | Mod: PBBFAC | Performed by: PODIATRIST

## 2025-02-27 RX ORDER — KETOCONAZOLE 20 MG/G
CREAM TOPICAL DAILY
Qty: 60 G | Refills: 2 | Status: SHIPPED | OUTPATIENT
Start: 2025-02-27

## 2025-03-08 NOTE — PROGRESS NOTES
Subjective:      Patient ID: Sussy Costa is a 89 y.o. female.    Chief Complaint:     Patient presents for routine diabetic foot care today.  She is doing well.  She is in need of diabetic education as well.  Still having some arthritic pain bilateral dorsal foot.  Inserts helping.  No other new complaints today other than increased thickening/discoloration of nails.    Review of Systems   Constitutional: Negative for chills, decreased appetite, fever, malaise/fatigue and night sweats.   HENT:  Negative for congestion, ear discharge, hearing loss, nosebleeds and tinnitus.    Eyes:  Negative for double vision, pain and visual disturbance.   Cardiovascular:  Negative for chest pain, claudication, cyanosis and palpitations.   Respiratory:  Negative for cough, hemoptysis, shortness of breath and wheezing.    Endocrine: Negative for cold intolerance and heat intolerance.   Hematologic/Lymphatic: Negative for adenopathy and bleeding problem.   Skin:  Positive for color change, dry skin, nail changes and unusual hair distribution. Negative for flushing and rash.   Musculoskeletal:  Positive for arthritis and stiffness. Negative for joint pain and myalgias.   Gastrointestinal:  Negative for abdominal pain, dysphagia, nausea and vomiting.   Genitourinary:  Negative for dysuria, flank pain, hematuria and pelvic pain.   Neurological:  Positive for disturbances in coordination, paresthesias and sensory change. Negative for loss of balance and numbness.   Psychiatric/Behavioral:  Negative for altered mental status, hallucinations and suicidal ideas. The patient does not have insomnia.    Allergic/Immunologic: Negative for environmental allergies and persistent infections.           Objective:      Physical Exam  Vitals reviewed.   Constitutional:       Appearance: She is well-developed.   HENT:      Head: Normocephalic and atraumatic.   Cardiovascular:      Pulses:           Dorsalis pedis pulses are 2+ on the right side  and 2+ on the left side.        Posterior tibial pulses are 2+ on the right side and 2+ on the left side.      Comments: No edema noted to b/L LEs  Pulmonary:      Effort: Pulmonary effort is normal.   Musculoskeletal:         General: Normal range of motion.      Comments: Muscle strength is 5/5 in all groups bilaterally.  Non reducible equinus noted to left lower extremity  POP to insertion of the AT, left    Positive Tinel sign/provocation sign bilateral tibial nerve.   Feet:      Right foot:      Protective Sensation: 5 sites tested.  5 sites sensed.      Skin integrity: Callus and dry skin present.      Left foot:      Protective Sensation: 5 sites tested.  5 sites sensed.      Skin integrity: Callus and dry skin present.   Skin:     General: Skin is warm and dry.      Capillary Refill: Capillary refill takes 2 to 3 seconds.      Coloration: Skin is pale.      Comments: Long, thickened, discolored  toenails 1-5 with subungual debris.  Skin dry thin and atrophic.   Neurological:      Mental Status: She is alert and oriented to person, place, and time.      Sensory: Sensory deficit present.      Motor: Atrophy present.      Deep Tendon Reflexes: Reflexes abnormal.      Reflex Scores:       Patellar reflexes are 1+ on the right side and 1+ on the left side.       Achilles reflexes are 1+ on the right side and 1+ on the left side.     Comments: Intact gross sensation noted to b/L LEs   Psychiatric:         Behavior: Behavior normal.               Assessment:       Encounter Diagnoses   Name Primary?    Type II diabetes mellitus with neurological manifestations Yes    Onychomycosis due to dermatophyte     Corn or callus     Ingrown nail            Plan:       Sussy was seen today for diabetes mellitus and nail care.    Diagnoses and all orders for this visit:    Type II diabetes mellitus with neurological manifestations    Onychomycosis due to dermatophyte    Corn or callus    Ingrown nail    Other orders  -      ketoconazole (NIZORAL) 2 % cream; Apply topically once daily.        I counseled the patient on her conditions, their implications and medical management.    Decision making:  Chronic illnesses discussed in detail, previous records/notes and imaging independently reviewed, prescription drug management performed in addition to lengthy discussion regarding both conservative and surgical treatment options.    Discussed options for peripheral neuropathy/nerve entrapment syndrome including nerve block therapy, surgical nerve entrapment decompression procedures, and various vitamins and supplementation available shown to improve nerve function.    Routine Foot Care    Performed by:  Alen Campa. DPM  Authorized by:  Patient     Consent Done?:  Yes (Verbal)     Nail Care Type:  Debride  Location(s): All  (Left 1st Toe, Left 3rd Toe, Left 2nd Toe, Left 4th Toe, Left 5th Toe, Right 1st Toe, Right 2nd Toe, Right 3rd Toe, Right 4th Toe and Right 5th Toe)  Patient tolerance:  Patient tolerated the procedure well with no immediate complications     With patient's permission, the toenails mentioned above were aggressively reduced and debrided using a nail nipper, removing all offending nail and debris. The patient will continue to monitor the areas daily, inspect the feet, wear protective shoe gear when ambulatory, and moisturizer to maintain skin integrity.      Callus Care Type: Debride    With patient's permission, the calluses/hyperkeratotic lesions mentioned above were aggressively reduced and debrided using a number 15 blade. The patient will continue to monitor the areas daily, inspect the feet, wear protective shoe gear when ambulatory, and moisturizer to maintain skin integrity.     Begin ketoconazole.    Shoe inspection. Diabetic Foot Education. Patient reminded of the importance of good nutrition and blood sugar control to help prevent podiatric complications of diabetes. Patient instructed on proper foot hygeine.  We discussed wearing proper shoe gear, daily foot inspections and Diabetic foot education in detail.    Return to clinic in 3-6 months or sooner if problems arise         .

## 2025-03-14 ENCOUNTER — OFFICE VISIT (OUTPATIENT)
Dept: ORTHOPEDICS | Facility: CLINIC | Age: 89
End: 2025-03-14
Payer: MEDICARE

## 2025-03-14 DIAGNOSIS — R21 SKIN RASH: Primary | ICD-10-CM

## 2025-03-14 DIAGNOSIS — S72.141D CLOSED DISPLACED INTERTROCHANTERIC FRACTURE OF RIGHT FEMUR WITH ROUTINE HEALING, SUBSEQUENT ENCOUNTER: ICD-10-CM

## 2025-03-14 PROCEDURE — 99999 PR PBB SHADOW E&M-EST. PATIENT-LVL III: CPT | Mod: PBBFAC,,, | Performed by: NURSE PRACTITIONER

## 2025-03-14 PROCEDURE — 99213 OFFICE O/P EST LOW 20 MIN: CPT | Mod: PBBFAC | Performed by: NURSE PRACTITIONER

## 2025-03-14 RX ORDER — HYDROCORTISONE 25 MG/G
CREAM TOPICAL 2 TIMES DAILY
Qty: 20 G | Refills: 0 | Status: SHIPPED | OUTPATIENT
Start: 2025-03-14

## 2025-03-14 NOTE — PROGRESS NOTES
Mrs. Costa is here today for a skin check for a rash      ICD-10-CM ICD-9-CM   1. Skin rash  R21 782.1   2. Closed displaced intertrochanteric fracture of right femur with routine healing, subsequent encounter  S72.198D V54.13       by Dr. Blunt on 11/25/2024.    Interval History:  She reports that she has been doing well but noticed a rash that popped up on the right lateral side of her leg near her incision.  She describes it as a small patch that itches.  She denies scratching the area nor getting bit by any bugs.  She has not done anything for the itching.  She denies any falls or injuries since last seen in the clinic.  She is using a walker for gait assistance.    Per my previous note patient is currently being followed for her ORIF for a right femur fracture.  Currently getting physical therapy.    Physical exam:  The patient's incision is open to air and healed.  No signs of infection noted.  There is a 1.5 x 1.5 circular red raise lesion on the lateral side of her right leg.  There is no other surrounding erythema or drainage present.  Her other lateral hip incision has healed.  She has tactile stimulation to their lower leg, she denies calf pain, there is no leg edema and their pedal pulse is palpable x 2.  No pain with axial loading, no pain with log roll.  Hip range of motion 0-90 degrees.  Knee ROM is 0-100 degrees.    RADS:  None.    Assessment:  Follow up  Plan:    ICD-10-CM ICD-9-CM   1. Skin rash  R21 782.1   2. Closed displaced intertrochanteric fracture of right femur with routine healing, subsequent encounter  S72.141D V54.13     Current care, treatment plan, precautions, activity level/ modifications, limitations, rehabilitation exercises and proposed future treatment were discussed with the patient. We discussed the need to monitor for changes in symptoms and condition and report them to the physician.  Discussed importance of compliance with all appointments and follow up examinations.        89-year-old female presents to the clinic for evaluation of his skin rash to the lateral side of her right leg near her incision.  I suspect this is a contact dermatitis.  I will treat her with hydrocortisone cream twice a day for 5 days and instruct her to take Claritin q.h.s. for the next 2 weeks.  She will monitor the area and let me know if her symptoms gets worse.  Otherwise we will see her back at her next scheduled visits in May.

## 2025-03-17 ENCOUNTER — EXTERNAL HOME HEALTH (OUTPATIENT)
Dept: HOME HEALTH SERVICES | Facility: HOSPITAL | Age: 89
End: 2025-03-17
Payer: MEDICARE

## 2025-03-24 ENCOUNTER — PATIENT MESSAGE (OUTPATIENT)
Dept: INTERNAL MEDICINE | Facility: CLINIC | Age: 89
End: 2025-03-24
Payer: MEDICARE

## 2025-03-25 ENCOUNTER — TELEPHONE (OUTPATIENT)
Dept: ENDOCRINOLOGY | Facility: CLINIC | Age: 89
End: 2025-03-25
Payer: MEDICARE

## 2025-03-25 ENCOUNTER — INFUSION (OUTPATIENT)
Dept: INFECTIOUS DISEASES | Facility: HOSPITAL | Age: 89
End: 2025-03-25
Attending: NURSE PRACTITIONER
Payer: MEDICARE

## 2025-03-25 VITALS
SYSTOLIC BLOOD PRESSURE: 166 MMHG | DIASTOLIC BLOOD PRESSURE: 72 MMHG | BODY MASS INDEX: 18.69 KG/M2 | HEIGHT: 65 IN | WEIGHT: 112.19 LBS | RESPIRATION RATE: 18 BRPM | TEMPERATURE: 98 F | OXYGEN SATURATION: 98 % | HEART RATE: 64 BPM

## 2025-03-25 DIAGNOSIS — M81.0 SENILE OSTEOPOROSIS: Primary | ICD-10-CM

## 2025-03-25 PROCEDURE — 96372 THER/PROPH/DIAG INJ SC/IM: CPT

## 2025-03-25 PROCEDURE — 63600175 PHARM REV CODE 636 W HCPCS: Mod: JZ,TB | Performed by: NURSE PRACTITIONER

## 2025-03-25 RX ADMIN — ROMOSOZUMAB-AQQG 210 MG: 105 INJECTION, SOLUTION SUBCUTANEOUS at 11:03

## 2025-03-25 NOTE — PROGRESS NOTES
Pt received Evenity 2/12 in bilateral arms; Pt denies any dental sx in last 3 months, pt taking Vit D/Ca+;  Pt tolerated well;      Pt states she needs to have a [place] Tooth pulled. Does not have appt at this time; will reach out to MD for scheduling guidelines around the extraction;

## 2025-03-25 NOTE — TELEPHONE ENCOUNTER
See patient message ---    It's not recommended to delay Evenity treatment for dental procedures, but it's ultimately up to her. ONJ risk is low although still a risk. I also recommend to make sure her dentist knows she's on Evenity just in case there's something less invasive that can be done for her tooth.

## 2025-03-31 ENCOUNTER — PATIENT MESSAGE (OUTPATIENT)
Dept: ENDOCRINOLOGY | Facility: CLINIC | Age: 89
End: 2025-03-31
Payer: MEDICARE

## 2025-03-31 ENCOUNTER — TELEPHONE (OUTPATIENT)
Dept: ENDOCRINOLOGY | Facility: CLINIC | Age: 89
End: 2025-03-31

## 2025-04-09 ENCOUNTER — LAB VISIT (OUTPATIENT)
Dept: LAB | Facility: HOSPITAL | Age: 89
End: 2025-04-09
Attending: HOSPITALIST
Payer: MEDICARE

## 2025-04-09 DIAGNOSIS — Z85.038 HISTORY OF COLON CANCER: ICD-10-CM

## 2025-04-09 LAB
ABSOLUTE EOSINOPHIL (OHS): 0.75 K/UL
ABSOLUTE MONOCYTE (OHS): 0.58 K/UL (ref 0.3–1)
ABSOLUTE NEUTROPHIL COUNT (OHS): 4.9 K/UL (ref 1.8–7.7)
ALBUMIN SERPL BCP-MCNC: 3.2 G/DL (ref 3.5–5.2)
ALP SERPL-CCNC: 150 UNIT/L (ref 40–150)
ALT SERPL W/O P-5'-P-CCNC: 26 UNIT/L (ref 10–44)
ANION GAP (OHS): 9 MMOL/L (ref 8–16)
AST SERPL-CCNC: 31 UNIT/L (ref 11–45)
BASOPHILS # BLD AUTO: 0.09 K/UL
BASOPHILS NFR BLD AUTO: 1.2 %
BILIRUB SERPL-MCNC: 0.4 MG/DL (ref 0.1–1)
BUN SERPL-MCNC: 26 MG/DL (ref 8–23)
CALCIUM SERPL-MCNC: 8.9 MG/DL (ref 8.7–10.5)
CARCINOEMBRYONIC ANTIGEN (OHS): 2.1 NG/ML
CHLORIDE SERPL-SCNC: 111 MMOL/L (ref 95–110)
CO2 SERPL-SCNC: 21 MMOL/L (ref 23–29)
CREAT SERPL-MCNC: 1.1 MG/DL (ref 0.5–1.4)
ERYTHROCYTE [DISTWIDTH] IN BLOOD BY AUTOMATED COUNT: 13.5 % (ref 11.5–14.5)
GFR SERPLBLD CREATININE-BSD FMLA CKD-EPI: 48 ML/MIN/1.73/M2
GLUCOSE SERPL-MCNC: 234 MG/DL (ref 70–110)
HCT VFR BLD AUTO: 36.6 % (ref 37–48.5)
HGB BLD-MCNC: 11.7 GM/DL (ref 12–16)
IMM GRANULOCYTES # BLD AUTO: 0.02 K/UL (ref 0–0.04)
IMM GRANULOCYTES NFR BLD AUTO: 0.3 % (ref 0–0.5)
LYMPHOCYTES # BLD AUTO: 1.33 K/UL (ref 1–4.8)
MCH RBC QN AUTO: 31.3 PG (ref 27–31)
MCHC RBC AUTO-ENTMCNC: 32 G/DL (ref 32–36)
MCV RBC AUTO: 98 FL (ref 82–98)
NUCLEATED RBC (/100WBC) (OHS): 0 /100 WBC
PLATELET # BLD AUTO: 214 K/UL (ref 150–450)
PMV BLD AUTO: 11.6 FL (ref 9.2–12.9)
POTASSIUM SERPL-SCNC: 4.2 MMOL/L (ref 3.5–5.1)
PROT SERPL-MCNC: 6.2 GM/DL (ref 6–8.4)
RBC # BLD AUTO: 3.74 M/UL (ref 4–5.4)
RELATIVE EOSINOPHIL (OHS): 9.8 %
RELATIVE LYMPHOCYTE (OHS): 17.3 % (ref 18–48)
RELATIVE MONOCYTE (OHS): 7.6 % (ref 4–15)
RELATIVE NEUTROPHIL (OHS): 63.8 % (ref 38–73)
SODIUM SERPL-SCNC: 141 MMOL/L (ref 136–145)
WBC # BLD AUTO: 7.67 K/UL (ref 3.9–12.7)

## 2025-04-09 PROCEDURE — 80053 COMPREHEN METABOLIC PANEL: CPT

## 2025-04-09 PROCEDURE — 82378 CARCINOEMBRYONIC ANTIGEN: CPT

## 2025-04-09 PROCEDURE — 85025 COMPLETE CBC W/AUTO DIFF WBC: CPT

## 2025-04-09 PROCEDURE — 36415 COLL VENOUS BLD VENIPUNCTURE: CPT

## 2025-04-10 ENCOUNTER — OFFICE VISIT (OUTPATIENT)
Dept: HEMATOLOGY/ONCOLOGY | Facility: CLINIC | Age: 89
End: 2025-04-10
Payer: MEDICARE

## 2025-04-10 VITALS
DIASTOLIC BLOOD PRESSURE: 63 MMHG | RESPIRATION RATE: 19 BRPM | TEMPERATURE: 98 F | OXYGEN SATURATION: 97 % | HEART RATE: 61 BPM | WEIGHT: 111.56 LBS | SYSTOLIC BLOOD PRESSURE: 154 MMHG | HEIGHT: 65 IN | BODY MASS INDEX: 18.59 KG/M2

## 2025-04-10 DIAGNOSIS — C7A.090 CARCINOID BRONCHIAL ADENOMA, RIGHT: ICD-10-CM

## 2025-04-10 DIAGNOSIS — M81.0 SENILE OSTEOPOROSIS: ICD-10-CM

## 2025-04-10 DIAGNOSIS — K86.89 PANCREATIC INSUFFICIENCY: ICD-10-CM

## 2025-04-10 DIAGNOSIS — I10 PRIMARY HYPERTENSION: ICD-10-CM

## 2025-04-10 DIAGNOSIS — Z79.4 TYPE 2 DIABETES MELLITUS WITH STAGE 3A CHRONIC KIDNEY DISEASE, WITH LONG-TERM CURRENT USE OF INSULIN: ICD-10-CM

## 2025-04-10 DIAGNOSIS — N18.31 TYPE 2 DIABETES MELLITUS WITH STAGE 3A CHRONIC KIDNEY DISEASE, WITH LONG-TERM CURRENT USE OF INSULIN: ICD-10-CM

## 2025-04-10 DIAGNOSIS — E11.22 TYPE 2 DIABETES MELLITUS WITH STAGE 3A CHRONIC KIDNEY DISEASE, WITH LONG-TERM CURRENT USE OF INSULIN: ICD-10-CM

## 2025-04-10 DIAGNOSIS — Z85.038 HISTORY OF COLON CANCER: Primary | ICD-10-CM

## 2025-04-10 DIAGNOSIS — K86.2 PANCREATIC CYST: ICD-10-CM

## 2025-04-10 PROCEDURE — 99999 PR PBB SHADOW E&M-EST. PATIENT-LVL V: CPT | Mod: PBBFAC,,, | Performed by: REGISTERED NURSE

## 2025-04-10 PROCEDURE — 99215 OFFICE O/P EST HI 40 MIN: CPT | Mod: PBBFAC | Performed by: REGISTERED NURSE

## 2025-04-10 NOTE — Clinical Note
Kale - just wanted to clarify when you wanted her next scans. Last were in 10/2024. Your note mentioned 6 months, but CC chart didn't mention anything. I have her down to come back in 6 months with labs and scans, but if you want them done sooner, please let me know!

## 2025-04-10 NOTE — PROGRESS NOTES
MEDICAL ONCOLOGY FOLLOW-UP VISIT     Best Contact Phone Number(s): [phone] (home)      Cancer/Stage/TNM:    Cancer Staging   History of colon cancer  Staging form: Colon and Rectum, AJCC 8th Edition  - Clinical: Stage IIB (cT4a, cN0, cM0) - Signed by Nakul English MD on 2/3/2023       Reason for visit:  Stage II CRC on surveillance    HPI: Sussy Costa is a 89 y.o. female with asthma, recurrent PNA and hypoxic respiratory failure, and DM2 who was hospitalized 10/2022 for large bowel obstruction. 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  She deferred adjuvant chemotherapy for her high risk stage II CRC.  She presents to medical oncology clinic for routine follow up.    Interval History:   Presents to clinic today for follow up. Doing well overall. Recovering from broken hip in November. Appetite and weight remain stable, though PO intake has been lower over the last week or so. No changes in bowel movements. No nausea, vomiting or overt bleeding. Mild, intermittent SOB with exertion, stable from prior. Improves with rest. Compliant with Creon. Has occasional tingling in her feet, mostly at night. No recent falls.       Oncology History   History of colon cancer   10/25/2022 Surgery    Right hemicolectomy    Moderately differentiated MMR proficient adenocarcinoma invading into the visceral peritoneum with associated PNI, LVI, and high tumor budding. Negative margins. 0/18 LN. [id number]     10/25/2022 Imaging Significant Findings    CT a/p with contrast:     1. Circumferential wall thickening of the short segment of ascending colon and apple-core appearance with pericolic fat stranding and free fluid, resulting in significant mass effect and dilatation of the cecum up to measuring 10 cm.  Suspect obstructing ascending  colon neoplasm.    2. Diffuse gastric wall thickening and enhancement suggesting gastritis.  3. Stable innumerable pancreatic hypodense lesion.  4. Pulmonary nodules similar in size and number in comparison prior.  Metastasis not excluded.  5. Multiple hypodense lesions in the liver and a few in the spleen as well, similar to prior.  Metastasis not excluded.       12/7/2022 Imaging Significant Findings    CT chest:    1. Following resolution of left inter lobar and lower lobe bronchi mucous plugging, there is resolution of previous left lower lobe atelectasis.  However, there are many bilateral subcentimeter pulmonary nodules in both lower lobes which appear increased since a CT of the abdomen and pelvis 10/25/2022 in which there is partial imaging of the chest base.  There is a new 1.4 cm right lower lobe nodule.  2. Interval development of small volume right-sided pleural effusion.  This report was flagged in Epic as abnormal.     12/19/2022 Imaging Significant Findings    MR abdomen without evidence of intrabdominal malignancy. Ongoing chronic pancreatitis     1/24/2023 Biopsy    Biopsy of right lower lube nodule : Typical carcinoid. No mitoses noted.     2/15/2023 Notable Event      Seen by rad onc (Dr. Zamorano) 02/15/23 regarding the carcinoid tumor in the lung- imaging felt consistent with an indolent carcinoid tumor over the last decade with more recent inflammatory nodule. Plan for ongoing surveillance.     5/1/2023 Imaging Significant Findings    CT torso: Improvement in the 1.4cm RLL nodule. This nodule was not biopsied, and was though possible more inflammatory in nature. Other subcentimeter nodules remain stable. No evidence of disease progression. No other evidence of recurrent or persistent colon cancer.     6/26/2023 Procedure    Colonoscopy  Impression:      - Patent side-to-side ileo-colonic anastomosis,                          characterized by healthy appearing mucosa.                          -  Diverticulosis in the sigmoid colon.                          - Internal hemorrhoids.                          - No specimens collected.       Imaging Significant Findings    CT CAP  Impression:  1. Postoperative change prior right hemicolectomy without CT evidence of residual/recurrent disease.  2. Numerous scattered ground-glass and solid pulmonary nodules, not significantly changed.  No appreciable new or enlarging pulmonary nodules.  3. Cystic lesions within the pancreas with persistent dilatation of the main pancreatic duct, not significantly changed.     4/2024 Imaging Significant Findings    CT Chest 5/6/24  1. Redemonstration numerous bilateral pulmonary nodules as above which appear grossly stable.  2. Interval increase in size of right pleural effusion and small left pleural effusion.  3. 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  4. Linear opacities in the left lower lobe extending from the hilum to the pleura and in the right middle lobe, likely subsegmental atelectasis versus scarring.  5. Additional findings as above.    CT a/p 4/29/24  1. Large amount of stool throughout the colon suggests constipation, correlation is advised.  2. Findings suggest hepatic steatosis, correlation with LFTs recommended.  3. Stable configuration of the pancreas noting likely sequela of chronic pancreatitis and ductal dilation.  No interval inflammation about the pancreas.  Correlation with pancreatic enzymes as warranted.  4. The urinary bladder is distended, correlation with any history of outlet obstruction or urinary retention.  5. Stable pulmonary nodules, please see exam 11/18/2023.     10/4/2024 Imaging Significant Findings    CT Torso 10/4/24  Impression:  - Patient with pulmonary carcinoid tumor and colon adenocarcinoma status post right hemicolectomy.  - No evidence of new metastatic disease in the chest, abdomen, or pelvis.  - Multiple bilateral solid and  "ground-glass pulmonary nodules, overall unchanged in size and distribution since 2022.  Differential considerations discussed above.  No new or enlarging pulmonary nodules.  Continued attention on follow-up is advised.  - Multiple large cystic lesions in the pancreas, 1 of which has enlarged from 2022.  Further evaluation could be obtained with MRI/MRCP if clinically warranted.        Physical Exam:   BP (!) 154/63 (BP Location: Left arm, Patient Position: Sitting)   Pulse 61   Temp 97.8 °F (36.6 °C) (Temporal)   Resp 19   Ht 5' 5" (1.651 m)   Wt 50.6 kg (111 lb 8.8 oz)   SpO2 97%   BMI 18.56 kg/m²      ECOG Performance Status: (foot note - ECOG PS provided by Eastern Cooperative Oncology Group) 1 - Symptomatic but completely ambulatory    Physical Exam  Constitutional:       General: She is not in acute distress.     Appearance: She is normal weight.   HENT:      Head: Normocephalic.      Mouth/Throat:      Mouth: Mucous membranes are moist.      Pharynx: Oropharynx is clear.   Eyes:      General: No scleral icterus.     Conjunctiva/sclera: Conjunctivae normal.      Pupils: Pupils are equal, round, and reactive to light.   Cardiovascular:      Rate and Rhythm: Normal rate and regular rhythm.      Heart sounds: No murmur heard.  Pulmonary:      Effort: Pulmonary effort is normal. No respiratory distress.      Breath sounds: Normal breath sounds.   Abdominal:      General: There is no distension.      Palpations: Abdomen is soft.   Musculoskeletal:         General: Normal range of motion.      Cervical back: Normal range of motion and neck supple.      Right lower leg: No edema.      Left lower leg: No edema.   Lymphadenopathy:      Cervical: No cervical adenopathy.   Skin:     General: Skin is warm.      Coloration: Skin is not jaundiced.   Neurological:      General: No focal deficit present.      Mental Status: She is alert and oriented to person, place, and time.      Motor: No weakness.   Psychiatric:    "      Mood and Affect: Mood normal.         Behavior: Behavior normal.         Thought Content: Thought content normal.         Labs:   Lab Results   Component Value Date     04/09/2025    K 4.2 04/09/2025    CREATININE 1.1 04/09/2025    WBC 7.67 04/09/2025    HGB 11.7 (L) 04/09/2025     04/09/2025    AST 31 04/09/2025    ALT 26 04/09/2025    ALKPHOS 150 04/09/2025    BILITOT 0.4 04/09/2025      Imaging:     X-Ray Pelvis Routine AP  Narrative: EXAMINATION:  XR PELVIS ROUTINE AP    CLINICAL HISTORY:  Pain, unspecified    TECHNIQUE:  AP view of the pelvis was performed.    COMPARISON:  01/06/2025    FINDINGS:  Patient may have osteoporosis.  Left hip joint space is satisfactory.  Degenerative joint disease is present at the right hip with narrowing and marginal spurring.  Both proximal femurs show similar fractures located transversely at and just below level of the lesser trochanters with the separation and displacement of the lesser trochanters.  Callus is present at both fractures.  Some heterotopic ossification is noted above left greater trochanter.  Internal fixation hardware remains, similar on both sides with the intramedullary valeri with proximal and distal interlocking screws.  Some periosteal reaction is noted at the distal screws.    Extensive atherosclerosis calcifications are seen along bilateral femoral arteries.  Impression: Healing fractures of the bilateral proximal femurs status post ORIF.  The location and possible osteoporosis suggest bisphosphonate associated fractures.  Correlate clinically.    DJD right hip.    Electronically signed by: Jass Preciado MD  Date:    02/18/2025  Time:    13:38  X-Ray Femur 2 View Right  Narrative: EXAMINATION:  XR FEMUR 2 VIEW RIGHT    CLINICAL HISTORY:  Pain, unspecified    TECHNIQUE:  AP and lateral views of the right femur were performed.    COMPARISON:  01/06/2025.    FINDINGS:  Once again, interlocking intramedullary rods are identified within  the right femoral neck and femoral shaft.  There is a healing inter trochanteric fracture of the proximal right femur with no detrimental change in position and alignment of the fracture fragments when compared to the prior examination.  There are prominent degenerative changes of the right hip.  There is atherosclerotic calcification identified throughout the right thigh arteries.  Impression: Status post ORIF of the proximal right femur without evidence for hardware failure.    Prominent degenerative changes of the right hip.    Atherosclerosis.    Electronically signed by: Justin Washington MD  Date:    02/18/2025  Time:    12:13      Diagnoses:       1. History of colon cancer    2. Primary hypertension    3. Carcinoid bronchial adenoma, right    4. Senile osteoporosis    5. Pancreatic insufficiency    6. Pancreatic cyst       Assessment and Plan:     1. History of colon cancer  Overview:  Right sided colon cancer diagnosed in setting of LBO requiring urgent hemicolectomy 10/25/2022 with path showing pT4aN0 disease. CT chest 12/7/22 with possible lung metastases and MR liver with indeterminate liver lesions. 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 Patient elected for surveillance.      Assessment & Plan:  Most recent scans with RICKY.   CEA remains normal.   RTC in 6 months with labs and CT.      2. Primary hypertension  Overview:  Home medications include losartan    Assessment & Plan:  Following with PCP.   Continue medical management.       3. Carcinoid bronchial adenoma, right  Overview:  Incidentally noted on imaging Undergoing surveillance 12/2022 and biopsy proven 01/2023. On imaging review appears indolent. Plan for surveillance    Assessment & Plan:  - Monitor with ongoing scans. No changes on most recent imaging.       4. Senile osteoporosis  Assessment & Plan:  Continue prolia and vitamin D per PCP      5. Pancreatic  insufficiency  Overview:  Diagnosed in setting of weight loss and bowel incontinence 2022. Has chronic pancreatitis on imaging. Home medications include Creon    Assessment & Plan:  Continue Creon.       6. Pancreatic cyst  Assessment & Plan:  No new symptoms.   Monitor with next scans.         Patient is in agreement with the proposed treatment plan. All questions were answered to the patient's satisfaction. Pt knows to call clinic if anything is needed before the next clinic visit.    Patient discussed with collaborating physician, Dr. English.    At least 30 minutes were spent today on this encounter including face to face time with the patient, data gathering/interpretation and documentation.       Damaris Valadez, MSN, APRN, [facility]-  Hematology and Medical Oncology  Clinical Nurse Specialist to Dr. Miller, Dr. Alexander & Dr. Wyatt         code applied: patient requires or will require a continuous, longitudinal, and active collaborative plan of care related to this patient's health condition, colon cancer --the management of which requires the direction of a practitioner with specialized clinical knowledge, skill, and expertise.     Route Chart for Scheduling    Med Onc Chart Routing      Follow up with physician 6 months. with labs and scans 1-2 days prior to seeing Dr. English   Follow up with DOMON[facility]    Infusion scheduling note    Injection scheduling note    Labs CBC, CMP and CEA   Scheduling:  Preferred lab:  Lab interval:     Imaging CT chest abdomen pelvis      Pharmacy appointment    Other referrals                Therapy Plan Information  INF ROMOSOZUMAB-AQQG ([facility]) MONTHLY for Senile osteoporosis, noted on 11/9/2017  Medications  romosozumab-aqqg ([facility]) syringe kit 210 mg  210 mg, Subcutaneous, Every 30 days      No therapy plan of the specified type found.    No therapy plan of the specified type found.

## 2025-04-14 RX ORDER — PEN NEEDLE, DIABETIC 30 GX3/16"
NEEDLE, DISPOSABLE MISCELLANEOUS
Qty: 200 EACH | Refills: 11 | Status: SHIPPED | OUTPATIENT
Start: 2025-04-14

## 2025-04-21 ENCOUNTER — PATIENT MESSAGE (OUTPATIENT)
Dept: ENDOCRINOLOGY | Facility: CLINIC | Age: 89
End: 2025-04-21
Payer: MEDICARE

## 2025-04-23 ENCOUNTER — OFFICE VISIT (OUTPATIENT)
Dept: INTERNAL MEDICINE | Facility: CLINIC | Age: 89
End: 2025-04-23
Payer: MEDICARE

## 2025-04-23 VITALS
OXYGEN SATURATION: 95 % | HEART RATE: 82 BPM | SYSTOLIC BLOOD PRESSURE: 130 MMHG | BODY MASS INDEX: 18.57 KG/M2 | RESPIRATION RATE: 14 BRPM | DIASTOLIC BLOOD PRESSURE: 76 MMHG | HEIGHT: 65 IN | WEIGHT: 111.44 LBS | TEMPERATURE: 96 F

## 2025-04-23 DIAGNOSIS — N18.31 TYPE 2 DIABETES MELLITUS WITH STAGE 3A CHRONIC KIDNEY DISEASE, WITH LONG-TERM CURRENT USE OF INSULIN: Chronic | ICD-10-CM

## 2025-04-23 DIAGNOSIS — E78.5 HYPERLIPIDEMIA ASSOCIATED WITH TYPE 2 DIABETES MELLITUS: Chronic | ICD-10-CM

## 2025-04-23 DIAGNOSIS — J45.40 MODERATE PERSISTENT ASTHMA WITHOUT COMPLICATION: Chronic | ICD-10-CM

## 2025-04-23 DIAGNOSIS — E11.22 TYPE 2 DIABETES MELLITUS WITH STAGE 3A CHRONIC KIDNEY DISEASE, WITH LONG-TERM CURRENT USE OF INSULIN: Chronic | ICD-10-CM

## 2025-04-23 DIAGNOSIS — M71.22 BAKER'S CYST OF KNEE, LEFT: ICD-10-CM

## 2025-04-23 DIAGNOSIS — I50.32 CHRONIC DIASTOLIC HEART FAILURE: Chronic | ICD-10-CM

## 2025-04-23 DIAGNOSIS — G62.9 NEUROPATHY: ICD-10-CM

## 2025-04-23 DIAGNOSIS — I70.0 AORTIC ATHEROSCLEROSIS: Chronic | ICD-10-CM

## 2025-04-23 DIAGNOSIS — R80.9 TYPE 2 DIABETES MELLITUS WITH MICROALBUMINURIA, WITHOUT LONG-TERM CURRENT USE OF INSULIN: Primary | Chronic | ICD-10-CM

## 2025-04-23 DIAGNOSIS — N18.31 STAGE 3A CHRONIC KIDNEY DISEASE: Chronic | ICD-10-CM

## 2025-04-23 DIAGNOSIS — E11.69 HYPERLIPIDEMIA ASSOCIATED WITH TYPE 2 DIABETES MELLITUS: Chronic | ICD-10-CM

## 2025-04-23 DIAGNOSIS — I10 PRIMARY HYPERTENSION: Chronic | ICD-10-CM

## 2025-04-23 DIAGNOSIS — Z79.4 TYPE 2 DIABETES MELLITUS WITH STAGE 3A CHRONIC KIDNEY DISEASE, WITH LONG-TERM CURRENT USE OF INSULIN: Chronic | ICD-10-CM

## 2025-04-23 DIAGNOSIS — M54.16 LUMBAR RADICULOPATHY: ICD-10-CM

## 2025-04-23 DIAGNOSIS — I48.0 PAROXYSMAL ATRIAL FIBRILLATION: Chronic | ICD-10-CM

## 2025-04-23 DIAGNOSIS — I50.32 CHRONIC HEART FAILURE WITH PRESERVED EJECTION FRACTION (HFPEF): Chronic | ICD-10-CM

## 2025-04-23 DIAGNOSIS — E11.29 TYPE 2 DIABETES MELLITUS WITH MICROALBUMINURIA, WITHOUT LONG-TERM CURRENT USE OF INSULIN: Primary | Chronic | ICD-10-CM

## 2025-04-23 PROBLEM — I50.30 DIASTOLIC HEART FAILURE: Chronic | Status: ACTIVE | Noted: 2021-08-15

## 2025-04-23 PROCEDURE — 99214 OFFICE O/P EST MOD 30 MIN: CPT | Mod: S$PBB,,, | Performed by: INTERNAL MEDICINE

## 2025-04-23 PROCEDURE — G2211 COMPLEX E/M VISIT ADD ON: HCPCS | Mod: S$PBB,,, | Performed by: INTERNAL MEDICINE

## 2025-04-23 PROCEDURE — 99999 PR PBB SHADOW E&M-EST. PATIENT-LVL V: CPT | Mod: PBBFAC,,, | Performed by: INTERNAL MEDICINE

## 2025-04-23 PROCEDURE — 99215 OFFICE O/P EST HI 40 MIN: CPT | Mod: PBBFAC,PO | Performed by: INTERNAL MEDICINE

## 2025-04-23 NOTE — PROGRESS NOTES
Assessment:       1. Type 2 diabetes mellitus with microalbuminuria, without long-term current use of insulin    2. Type 2 diabetes mellitus with stage 3a chronic kidney disease, with long-term current use of insulin    3. Primary hypertension    4. Hyperlipidemia associated with type 2 diabetes mellitus    5. Aortic atherosclerosis    6. Paroxysmal atrial fibrillation    7. Chronic heart failure with preserved ejection fraction (HFpEF)    8. Chronic diastolic heart failure    9. Stage 3a chronic kidney disease    10. Moderate persistent asthma without complication    11. Neuropathy  - Ambulatory referral/consult to [facility]; Future    12. Lumbar radiculopathy  - Ambulatory referral/consult to [facility]; Future    13. Baker's cyst of knee, left        Plan:       1/2.  Continue NovoLog 3 units with breakfast and 5 units with lunch and dinner, Lantus 5 units, Jardiance 10 mg  3. Continue losartan 100 mg   4/5.  Continue Lipitor 20 mg   6.  Monitor   7/8.  Continue Lasix 40 mg (as needed for swelling), Jardiance 10 mg, losartan 100 mg   9.  Monitor CMP.    10. Albuterol as needed.  11/12.  Refer to [facility] back   13. Monitor.    Deep Scribe:  IMPRESSION:  1. Diabetes management stable with occasional high blood sugars.  2. HTN control appropriate with current medication regimen.  3. Heart failure management stable; patient discontinued Lasix for daily use but maintains for PRN use 40 mg as needed for edema.  4. Neuropathic symptoms in feet likely stemming from lower back issues.  5. Evaluated safety of continuing Evenity (romosozumab) for upcoming dental procedure, deferring to oral surgeon's assessment.  6. Recommend longer-acting anesthetic (bupivacaine) for wisdom tooth extraction to potentially reduce post-procedure pain medication needs.  7. Identified Baker's cyst as cause of transient knee pain and explained its tendency to come and go.    SUMMARY:   Continue Lasix 40 mg, Coreg 10 mg and  100 mg, Losartan 100 mg   Continue Lantus 5 units and Novolog 3 units with breakfast, 5 units with lunch and dinner   Continue Jardiance 10 mg and Lipitor 20 mg   Use Lasix for 3-5 days if weight increases by 3 lbs in a day or 5 lbs in 3 days   Referral to [facility] for spine-specific physical therapy in 3-4 weeks   Follow-up in 3 months    TYPE 2 DIABETES MELLITUS WITH HYPERGLYCEMIA:   Educated the patient about signs of hypoglycemia (sweating, lightheadedness, confusion) and the importance of checking glucose if symptoms occur.   Instructed the patient on when to administer mealtime insulin, even with lower glucose (80 mg/dL), if eating immediately after.   Continued Novolog 3 units with breakfast and 5 units with lunch and dinner.   Continued Lantus 5 units.   Continued Jardiance 10 mg.   Advised the patient to contact the office if blood sugars drop too low or if a new treatment plan is needed.   Evaluated the patient's blood sugar management and insulin dosage.   Ms. Costa reports occasionally experiencing hyperglycemia and checks blood sugar at home.   Continued long-term insulin therapy with Novolog (3 units with breakfast and 5 units with lunch and dinner) and Lantus (5 units).    ESSENTIAL HYPERTENSION:   Clarified to the patient that a blood pressure of 145 is considered elevated.   Continued Losartan 100 mg for hypertension management.   Evaluated blood pressure readings and determined the current medication regimen is appropriate.   Ms. Costa reports blood pressure is usually within normal range (130s or less), occasionally reaching 145.    CHRONIC SYSTOLIC HEART FAILURE:   Educated the patient on heart failure management, including when to use Lasix based on weight gain.   Instructed the patient to continue daily weight monitoring.   Advised the patient to use Lasix for 3-5 days if weight increases by 3 lbs in a day or 5 lbs in 3 days.   Ms. Costa reports daily weight has been 110-111  lbs.   Continued management with Lasix 40 mg, Coreg 10 mg, and Coreg 100 mg.   Noted that Lasix is currently not being taken regularly but kept on hand for swelling.    HYPERLIPIDEMIA AND AORTIC ATHEROSCLEROSIS:   Continued Lipitor 20 mg for management of hyperlipidemia and aortic atherosclerosis.    HIP PAIN AND BACK ISSUES:   Referred the patient to [facility] for spine-specific physical therapy, to begin in 3-4 weeks.   Noted that the patient is undergoing physical therapy for hip pain since January and is now using a cane.   Evaluated the patient's reported tingling sensation in feet at night when lying down.   Suspected the sensation may be related to nerve issues from the lower back.   Considered imaging of lower back but determined it's not necessary at this time.   Recommend [facility] program for spine-specific physical therapy, to be started in 3-4 weeks.    DEPENDENCE ON ENABLING DEVICES:   Noted that the patient is now using a can  e for mobility assistance.    FOLLOW-UP:   Follow up in 3 months.                 This note was generated with the assistance of ambient listening technology. Verbal consent was obtained by the patient and accompanying visitor(s) for the recording of patient appointment to facilitate this note. I attest to having reviewed and edited the generated note for accuracy, though some syntax or spelling errors may persist. Please contact the author of this note for any clarification.       Subjective:       Patient ID: Sussy Costa is a 89 y.o. female.    Chief Complaint: Follow-up (3M F/U) and Numbness (BLE tingling/numbness @ night)    HPI    89-year-old female here for follow-up.    Patient has diabetes on NovoLog 3 units with breakfast and 5 units with lunch and dinner, Lantus 5 units, Jardiance 10 mg.  Lab Results   Component Value Date    HGBA1C 5.6 01/06/2025    HGBA1C 5.9 (H) 11/25/2024    HGBA1C 5.7 (H) 09/16/2024     Lab Results   Component Value Date     LDLCALC 62.8 (L) 09/19/2023    CREATININE 1.1 04/09/2025     Patient has hypertension on losartan 100 mg.    Patient has hyperlipidemia and aortic atherosclerosis on Lipitor 20 mg.    Patient has paroxysmal atrial fibrillation on no current medication.      Patient has chronic diastolic and systolic heart failure on Lasix 40 mg (as needed for swelling), Jardiance 10 mg, losartan 100 mg.    Patient has moderate persistent asthma without complication on albuterol as needed.    History of Present Illness    CHIEF COMPLAINT:  Ms. Costa presents today for follow up    DIABETES MANAGEMENT:  She reports occasionally elevated blood sugars. She had a reading of 80 last week and was uncertain about insulin administration timing with this value.    CARDIOVASCULAR:  Home blood pressure measurements are typically in the 130s or less, with occasional elevations to 145. Her current weight is 110-111 lbs without foot edema.    MUSCULOSKELETAL:  She has been receiving physical therapy for hip since late January and now requires a cane for ambulation. She previously had a painful posterior knee mass that has spontaneously resolved.    NEUROLOGICAL:  She experiences tingling sensations specifically when lying down at night, which resolve with movement and are absent during daytime activities.    UPCOMING PROCEDURES:  [place] tooth extraction scheduled for May 20th.      ROS:  Cardiovascular: -lower extremity edema  Musculoskeletal: +limb pain, +pain with movement  Neurological: +tingling         Review of Systems          Objective:      Physical Exam  Vitals reviewed.   Constitutional:       Appearance: She is well-developed.   HENT:      Head: Normocephalic and atraumatic.      Mouth/Throat:      Pharynx: No oropharyngeal exudate.   Eyes:      General: No scleral icterus.        Right eye: No discharge.         Left eye: No discharge.      Pupils: Pupils are equal, round, and reactive to light.   Neck:      Thyroid: No thyromegaly.       Trachea: No tracheal deviation.   Cardiovascular:      Rate and Rhythm: Normal rate and regular rhythm.      Heart sounds: Normal heart sounds. No murmur heard.     No friction rub. No gallop.   Pulmonary:      Effort: Pulmonary effort is normal. No respiratory distress.      Breath sounds: Normal breath sounds. No wheezing or rales.   Chest:      Chest wall: No tenderness.   Abdominal:      General: Bowel sounds are normal. There is no distension.      Palpations: Abdomen is soft. There is no mass.      Tenderness: There is no abdominal tenderness. There is no guarding or rebound.   Musculoskeletal:         General: No tenderness. Normal range of motion.      Cervical back: Normal range of motion and neck supple.   Skin:     General: Skin is warm and dry.      Coloration: Skin is not pale.      Findings: No erythema or rash.   Neurological:      Mental Status: She is alert and oriented to person, place, and time.   Psychiatric:         Behavior: Behavior normal.

## 2025-04-25 ENCOUNTER — INFUSION (OUTPATIENT)
Dept: INFECTIOUS DISEASES | Facility: HOSPITAL | Age: 89
End: 2025-04-25
Payer: MEDICARE

## 2025-04-25 VITALS
HEIGHT: 65 IN | OXYGEN SATURATION: 99 % | RESPIRATION RATE: 18 BRPM | BODY MASS INDEX: 18.59 KG/M2 | DIASTOLIC BLOOD PRESSURE: 72 MMHG | HEART RATE: 65 BPM | WEIGHT: 111.56 LBS | TEMPERATURE: 98 F | SYSTOLIC BLOOD PRESSURE: 171 MMHG

## 2025-04-25 DIAGNOSIS — M81.0 SENILE OSTEOPOROSIS: Primary | ICD-10-CM

## 2025-04-25 PROCEDURE — 63600175 PHARM REV CODE 636 W HCPCS: Mod: JZ,TB | Performed by: NURSE PRACTITIONER

## 2025-04-25 PROCEDURE — 96372 THER/PROPH/DIAG INJ SC/IM: CPT

## 2025-04-25 RX ADMIN — ROMOSOZUMAB-AQQG 210 MG: 105 INJECTION, SOLUTION SUBCUTANEOUS at 11:04

## 2025-04-25 NOTE — PROGRESS NOTES
Patient arrives for Evenity (2 separate injections) injection - confirms use of vitamin D supplements and denies dental procedures over past 3 months - administered per guidelines to Bilateral arms.     Patient states that she has appointment scheduled for wisdom teeth removal on 052/20/2025. She has received clearance from oral surgeon and GHISLAINE Gonzalez to continue receiving Evenity injections.     Limited head-to-toe assessment due to privacy issues and visit reason though the opportunity was given for patient to express any concerns              I received a message that you are having dental work done, is that right? [place] tooth extraction? Any infection?      In regards to Evenity and this dental procedure - it is not recommended to delay Evenity treatment. The risk of osteonecrosis of the jaw is low although still a possibility. I recommend you talk with your dentist/oral surgeon about your options and to make sure they are aware that you are on Evenity.      Please let me know if you have any questions.       Thank you,  Heike Ye, ALTA-C  Endocrinology        Hi  Saw oral surgeon Barrett Galeana and will have surgery on May 20th. Advise him of taking Evenity and was told to continue takinf Evenity. My nest appointment for Evenity is this Friday April 25,2025  Thank you,   Sussy Costa    Great!

## 2025-05-02 ENCOUNTER — PATIENT MESSAGE (OUTPATIENT)
Dept: ENDOCRINOLOGY | Facility: CLINIC | Age: 89
End: 2025-05-02
Payer: MEDICARE

## 2025-05-12 ENCOUNTER — TELEPHONE (OUTPATIENT)
Dept: ENDOCRINOLOGY | Facility: CLINIC | Age: 89
End: 2025-05-12
Payer: MEDICARE

## 2025-05-12 DIAGNOSIS — E11.22 TYPE 2 DIABETES MELLITUS WITH STAGE 3A CHRONIC KIDNEY DISEASE, WITH LONG-TERM CURRENT USE OF INSULIN: Primary | ICD-10-CM

## 2025-05-12 DIAGNOSIS — Z79.4 TYPE 2 DIABETES MELLITUS WITH STAGE 3A CHRONIC KIDNEY DISEASE, WITH LONG-TERM CURRENT USE OF INSULIN: Primary | ICD-10-CM

## 2025-05-12 DIAGNOSIS — N18.31 TYPE 2 DIABETES MELLITUS WITH STAGE 3A CHRONIC KIDNEY DISEASE, WITH LONG-TERM CURRENT USE OF INSULIN: Primary | ICD-10-CM

## 2025-05-12 RX ORDER — INSULIN ASPART 100 [IU]/ML
INJECTION, SOLUTION INTRAVENOUS; SUBCUTANEOUS
Qty: 15 ML | Refills: 3 | Status: SHIPPED | OUTPATIENT
Start: 2025-05-12 | End: 2026-05-12

## 2025-05-12 NOTE — TELEPHONE ENCOUNTER
----- Message from Med Assistant Flores sent at 5/12/2025 10:12 AM CDT -----  Regarding: FW: Refill Request  Contact: Aubrey Guzman    ----- Message -----  From: Peace Mack  Sent: 5/12/2025   9:37 AM CDT  To: [facility] Staff  Subject: Refill Request                                   Refill RequestType:  Rx Refill RequestRefill or New Rx:  RefillRx Name and Strength: insulin aspart U-100 (NOVOLOG FLEXPEN U-100 INSULIN) 100 unit/mL (3 mL) In penPreferred Pharmacy with phone number:[facility] - [place], [place] - [address] [facility] [address] [address]Xtscg: [phone] Fax: [phone] or Mail Order:  LocalWould the patient rather a call back or response vis [facility]?  Call BackBest Call Back Number: [phone] Information:  Pharmacy says this RX unit has been increased.  Please rewrite RX

## 2025-05-19 ENCOUNTER — OFFICE VISIT (OUTPATIENT)
Dept: ORTHOPEDICS | Facility: CLINIC | Age: 89
End: 2025-05-19
Payer: MEDICARE

## 2025-05-19 ENCOUNTER — HOSPITAL ENCOUNTER (OUTPATIENT)
Dept: RADIOLOGY | Facility: HOSPITAL | Age: 89
Discharge: HOME | End: 2025-05-19
Attending: NURSE PRACTITIONER
Payer: MEDICARE

## 2025-05-19 DIAGNOSIS — R52 PAIN: Primary | ICD-10-CM

## 2025-05-19 DIAGNOSIS — S72.141D CLOSED DISPLACED INTERTROCHANTERIC FRACTURE OF RIGHT FEMUR WITH ROUTINE HEALING, SUBSEQUENT ENCOUNTER: Primary | ICD-10-CM

## 2025-05-19 DIAGNOSIS — R52 PAIN: ICD-10-CM

## 2025-05-19 PROCEDURE — 99213 OFFICE O/P EST LOW 20 MIN: CPT | Mod: PBBFAC,25 | Performed by: NURSE PRACTITIONER

## 2025-05-19 PROCEDURE — 99999 PR PBB SHADOW E&M-EST. PATIENT-LVL III: CPT | Mod: PBBFAC,,, | Performed by: NURSE PRACTITIONER

## 2025-05-19 PROCEDURE — 73552 X-RAY EXAM OF FEMUR 2/>: CPT | Mod: 26,RT,, | Performed by: RADIOLOGY

## 2025-05-19 PROCEDURE — 73552 X-RAY EXAM OF FEMUR 2/>: CPT | Mod: TC,RT

## 2025-05-19 PROCEDURE — 72170 X-RAY EXAM OF PELVIS: CPT | Mod: 26,,, | Performed by: RADIOLOGY

## 2025-05-19 PROCEDURE — 72170 X-RAY EXAM OF PELVIS: CPT | Mod: TC

## 2025-05-19 PROCEDURE — 99214 OFFICE O/P EST MOD 30 MIN: CPT | Mod: S$PBB,,, | Performed by: NURSE PRACTITIONER

## 2025-05-19 NOTE — PROGRESS NOTES
Mrs. Costa is here today for a follow up visit      ICD-10-CM ICD-9-CM   1. Closed displaced intertrochanteric fracture of right femur with routine healing, subsequent encounter  S72.141D V54.13       by Dr. Blunt on 11/25/2024.    Interval History:  She reports that she has been doing well.  She reports her legs get weak from time to time.  She discussed this with her PCP who had referred her to the [facility] program.  She said she had seen Anisha in spine and would like another appointment with her to discuss further.  In regards to her right hip, she reports no pain.  She denies any falls or injuries since last seen in the clinic.  She is using a cane for gait assistance.    Physical exam:  The patient's incision is open to air and healed.  No signs of infection noted.  She has tactile stimulation to their lower leg, she denies calf pain, there is no leg edema and their pedal pulse is palpable x 2.  No pain with axial loading, no pain with log roll.  Hip range of motion 0-90 degrees.  Knee ROM is 0-100 degrees.    RADS:  right Femur and pelvis xray was obtained, findings show IM nailing appears to be in good position and alignment.  Femoral neck fracture appears to be stable with progressive callus formation when compared to prior xray.  No evidence of hardware failure or new fracture seen.     Assessment:  Follow up  Plan:    ICD-10-CM ICD-9-CM   1. Closed displaced intertrochanteric fracture of right femur with routine healing, subsequent encounter  S72.141D V54.13     Current care, treatment plan, precautions, activity level/ modifications, limitations, rehabilitation exercises and proposed future treatment were discussed with the patient. We discussed the need to monitor for changes in symptoms and condition and report them to the physician.  Discussed importance of compliance with all appointments and follow up examinations.       89-year-old female presents to the clinic for follow up for her right  FNF s/p IM nailing in Nov 2024.  From a surgical standpoint she is doing quite well.  She is walking with a cane for support.  She does endorse some bilateral leg weakness in which her primary care doctor referred her to the [facility] Back program but the patient wants to see back and spine again.    He denies any falls or injuries since last seen in the clinic.  I discussed the x-ray findings with the patient.    Recommend continue home exercise program and follow up in 6 months with repeat x-ray of the pelvis and right femur.

## 2025-05-26 ENCOUNTER — INFUSION (OUTPATIENT)
Dept: INFECTIOUS DISEASES | Facility: HOSPITAL | Age: 89
End: 2025-05-26
Payer: MEDICARE

## 2025-05-26 VITALS
WEIGHT: 108.81 LBS | TEMPERATURE: 98 F | RESPIRATION RATE: 18 BRPM | HEART RATE: 66 BPM | SYSTOLIC BLOOD PRESSURE: 172 MMHG | OXYGEN SATURATION: 98 % | HEIGHT: 65 IN | DIASTOLIC BLOOD PRESSURE: 74 MMHG | BODY MASS INDEX: 18.13 KG/M2

## 2025-05-26 DIAGNOSIS — M81.0 SENILE OSTEOPOROSIS: Primary | ICD-10-CM

## 2025-05-26 PROCEDURE — 96372 THER/PROPH/DIAG INJ SC/IM: CPT

## 2025-05-26 PROCEDURE — 63600175 PHARM REV CODE 636 W HCPCS: Mod: JZ,TB | Performed by: NURSE PRACTITIONER

## 2025-05-26 RX ADMIN — ROMOSOZUMAB-AQQG 210 MG: 105 INJECTION, SOLUTION SUBCUTANEOUS at 11:05

## 2025-05-26 NOTE — PROGRESS NOTES
Patient arrives for Evenity (2 separate injections) injection - confirms use of vitamin D supplements  - administered per guidelines.    Patient states that she had wisdom teeth removal on 052/20/2025. She has received clearance from oral surgeon and GHISLAINE Gonzalez to continue receiving Evenity injections. See progress note from 04/25/2025 visit    Limited head-to-toe assessment due to privacy issues and visit reason though the opportunity was given for patient to express any concerns

## 2025-06-02 NOTE — H&P (VIEW-ONLY)
"DATE: 6/4/2025  PATIENT: Sussy Costa    Attending Physician: Jass Menjivar M.D.    HISTORY:  Sussy Costa is a 89 y.o. female who returns to me today for follow up.  She was last seen by me 5/14/2024.  Today she is doing well but notes since our last visit she fell and broke her right hip and had an IM right femur on 11/15/24. She has been having worsening pain down both legs with numbness and tingling. She did extensive PT for her right hip. She has tried gabapentin in the past but it made her very sleepy    The Patient denies myelopathic symptoms such as handwriting changes or difficulty with buttons/coins/keys. Denies perineal paresthesias, bowel/bladder dysfunction.      EXAM:  Ht 5' 5" (1.651 m)   Wt 51.4 kg (113 lb 5.1 oz)   BMI 18.86 kg/m²     My physical examination was notable for the following findings:     Musculoskeletal and neuro exam stable      IMAGING:    Today I personally re- reviewed AP, Lat and Flex/Ex  upright L-spine that demonstrate grade I anterolisthesis of L4 on L5.    MRI lumbar (2023) demonstrates multilevel degenerative changes of the lumbar spine, most advanced at L4-L5 with disc protrusion resulting in severe spinal canal stenosis     Body mass index is 18.86 kg/m².    Hemoglobin A1C   Date Value Ref Range Status   01/06/2025 5.6 4.0 - 5.6 % Final     Comment:     ADA Screening Guidelines:  5.7-6.4%  Consistent with prediabetes  >or=6.5%  Consistent with diabetes    High levels of fetal hemoglobin interfere with the HbA1C  assay. Heterozygous hemoglobin variants (HbS, HgC, etc)do  not significantly interfere with this assay.   However, presence of multiple variants may affect accuracy.     11/25/2024 5.9 (H) 4.0 - 5.6 % Final     Comment:     ADA Screening Guidelines:  5.7-6.4%  Consistent with prediabetes  >or=6.5%  Consistent with diabetes    High levels of fetal hemoglobin interfere with the HbA1C  assay. Heterozygous hemoglobin variants (HbS, HgC, etc)do  not " significantly interfere with this assay.   However, presence of multiple variants may affect accuracy.     09/16/2024 5.7 (H) 4.0 - 5.6 % Final     Comment:     ADA Screening Guidelines:  5.7-6.4%  Consistent with prediabetes  >or=6.5%  Consistent with diabetes    High levels of fetal hemoglobin interfere with the HbA1C  assay. Heterozygous hemoglobin variants (HbS, HgC, etc)do  not significantly interfere with this assay.   However, presence of multiple variants may affect accuracy.           ASSESSMENT/PLAN:    Sussy was seen today for low-back pain, back pain and knee pain.    Diagnoses and all orders for this visit:    Lumbar radiculopathy  -     Procedure Order to Pain Management; Future        Today we discussed at length all of the different treatment options including anti-inflammatories, acetaminophen, rest, ice, heat, physical therapy including strengthening and stretching exercises, home exercises, ROM, aerobic conditioning, aqua therapy, other modalities including ultrasound, massage, and dry needling, epidural steroid injections and finally surgical intervention.      Pt presents with chronic lumbar radiculopathy. Failure of conservative rx. Will order a bilateral L4-5 TFESI with pain mgmt. Pt will fu 2 weeks after

## 2025-06-03 ENCOUNTER — TELEPHONE (OUTPATIENT)
Dept: ORTHOPEDICS | Facility: CLINIC | Age: 89
End: 2025-06-03
Payer: MEDICARE

## 2025-06-03 DIAGNOSIS — M51.369 DEGENERATION OF INTERVERTEBRAL DISC OF LUMBAR REGION, UNSPECIFIED WHETHER PAIN PRESENT: Primary | ICD-10-CM

## 2025-06-04 ENCOUNTER — HOSPITAL ENCOUNTER (OUTPATIENT)
Dept: RADIOLOGY | Facility: HOSPITAL | Age: 89
Discharge: HOME | End: 2025-06-04
Attending: ORTHOPAEDIC SURGERY
Payer: MEDICARE

## 2025-06-04 ENCOUNTER — OFFICE VISIT (OUTPATIENT)
Dept: ORTHOPEDICS | Facility: CLINIC | Age: 89
End: 2025-06-04
Payer: MEDICARE

## 2025-06-04 ENCOUNTER — TELEPHONE (OUTPATIENT)
Dept: PAIN MEDICINE | Facility: CLINIC | Age: 89
End: 2025-06-04
Payer: MEDICARE

## 2025-06-04 VITALS — BODY MASS INDEX: 18.88 KG/M2 | HEIGHT: 65 IN | WEIGHT: 113.31 LBS

## 2025-06-04 DIAGNOSIS — M54.16 LUMBAR RADICULOPATHY: Primary | ICD-10-CM

## 2025-06-04 DIAGNOSIS — M51.369 DEGENERATION OF INTERVERTEBRAL DISC OF LUMBAR REGION, UNSPECIFIED WHETHER PAIN PRESENT: ICD-10-CM

## 2025-06-04 PROCEDURE — 99214 OFFICE O/P EST MOD 30 MIN: CPT | Mod: PBBFAC,25 | Performed by: ORTHOPAEDIC SURGERY

## 2025-06-04 PROCEDURE — 72110 X-RAY EXAM L-2 SPINE 4/>VWS: CPT | Mod: TC

## 2025-06-04 PROCEDURE — 72110 X-RAY EXAM L-2 SPINE 4/>VWS: CPT | Mod: 26,,, | Performed by: RADIOLOGY

## 2025-06-04 PROCEDURE — 99213 OFFICE O/P EST LOW 20 MIN: CPT | Mod: S$PBB,,, | Performed by: ORTHOPAEDIC SURGERY

## 2025-06-04 PROCEDURE — 99999 PR PBB SHADOW E&M-EST. PATIENT-LVL IV: CPT | Mod: PBBFAC,,, | Performed by: ORTHOPAEDIC SURGERY

## 2025-06-05 ENCOUNTER — OFFICE VISIT (OUTPATIENT)
Dept: PODIATRY | Facility: CLINIC | Age: 89
End: 2025-06-05
Payer: MEDICARE

## 2025-06-05 VITALS
BODY MASS INDEX: 18.78 KG/M2 | DIASTOLIC BLOOD PRESSURE: 77 MMHG | HEART RATE: 66 BPM | HEIGHT: 65 IN | WEIGHT: 112.75 LBS | SYSTOLIC BLOOD PRESSURE: 163 MMHG

## 2025-06-05 DIAGNOSIS — M20.42 HAMMER TOES OF BOTH FEET: ICD-10-CM

## 2025-06-05 DIAGNOSIS — B35.1 ONYCHOMYCOSIS DUE TO DERMATOPHYTE: ICD-10-CM

## 2025-06-05 DIAGNOSIS — L84 CORN OR CALLUS: ICD-10-CM

## 2025-06-05 DIAGNOSIS — E11.49 TYPE II DIABETES MELLITUS WITH NEUROLOGICAL MANIFESTATIONS: Primary | ICD-10-CM

## 2025-06-05 DIAGNOSIS — M20.41 HAMMER TOES OF BOTH FEET: ICD-10-CM

## 2025-06-05 PROCEDURE — 11056 PARNG/CUTG B9 HYPRKR LES 2-4: CPT | Mod: PBBFAC | Performed by: PODIATRIST

## 2025-06-05 PROCEDURE — 11721 DEBRIDE NAIL 6 OR MORE: CPT | Mod: PBBFAC | Performed by: PODIATRIST

## 2025-06-05 PROCEDURE — 99999 PR PBB SHADOW E&M-EST. PATIENT-LVL IV: CPT | Mod: PBBFAC,,, | Performed by: PODIATRIST

## 2025-06-05 PROCEDURE — 99214 OFFICE O/P EST MOD 30 MIN: CPT | Mod: PBBFAC | Performed by: PODIATRIST

## 2025-06-05 RX ORDER — GABAPENTIN 300 MG/1
300 CAPSULE ORAL NIGHTLY
Qty: 30 CAPSULE | Refills: 2 | Status: SHIPPED | OUTPATIENT
Start: 2025-06-05 | End: 2026-06-05

## 2025-06-06 ENCOUNTER — TELEPHONE (OUTPATIENT)
Dept: PAIN MEDICINE | Facility: HOSPITAL | Age: 89
End: 2025-06-06
Payer: MEDICARE

## 2025-06-09 NOTE — PROGRESS NOTES
Subjective:      Patient ID: Sussy Costa is a 89 y.o. female.    Chief Complaint:     Patient presents for routine diabetic foot care today.  She is doing well.  She is in need of diabetic education as well.  Still having some arthritic pain bilateral dorsal foot.  Inserts helping.  No other new complaints today other than increased thickening/discoloration of nails as well as occasional painful hammertoes.    Review of Systems   Constitutional: Negative for chills, decreased appetite, fever, malaise/fatigue and night sweats.   HENT:  Negative for congestion, ear discharge, hearing loss, nosebleeds and tinnitus.    Eyes:  Negative for double vision, pain and visual disturbance.   Cardiovascular:  Negative for chest pain, claudication, cyanosis and palpitations.   Respiratory:  Negative for cough, hemoptysis, shortness of breath and wheezing.    Endocrine: Negative for cold intolerance and heat intolerance.   Hematologic/Lymphatic: Negative for adenopathy and bleeding problem.   Skin:  Positive for color change, dry skin, nail changes and unusual hair distribution. Negative for flushing and rash.   Musculoskeletal:  Positive for arthritis and stiffness. Negative for joint pain and myalgias.   Gastrointestinal:  Negative for abdominal pain, dysphagia, nausea and vomiting.   Genitourinary:  Negative for dysuria, flank pain, hematuria and pelvic pain.   Neurological:  Positive for disturbances in coordination, paresthesias and sensory change. Negative for loss of balance and numbness.   Psychiatric/Behavioral:  Negative for altered mental status, hallucinations and suicidal ideas. The patient does not have insomnia.    Allergic/Immunologic: Negative for environmental allergies and persistent infections.           Objective:      Physical Exam  Vitals reviewed.   Constitutional:       Appearance: She is well-developed.   HENT:      Head: Normocephalic and atraumatic.   Cardiovascular:      Pulses:           Dorsalis  pedis pulses are 2+ on the right side and 2+ on the left side.        Posterior tibial pulses are 2+ on the right side and 2+ on the left side.      Comments: No edema noted to b/L LEs  Pulmonary:      Effort: Pulmonary effort is normal.   Musculoskeletal:         General: Normal range of motion.      Comments: Muscle strength is 5/5 in all groups bilaterally.  Non reducible equinus noted to left lower extremity  POP to insertion of the AT, left    Positive Tinel sign/provocation sign bilateral tibial nerve.   Feet:      Right foot:      Protective Sensation: 5 sites tested.  5 sites sensed.      Skin integrity: Callus and dry skin present.      Left foot:      Protective Sensation: 5 sites tested.  5 sites sensed.      Skin integrity: Callus and dry skin present.   Skin:     General: Skin is warm and dry.      Capillary Refill: Capillary refill takes 2 to 3 seconds.      Coloration: Skin is pale.      Comments: Long, thickened, discolored  toenails 1-5 with subungual debris.  Skin dry thin and atrophic.   Neurological:      Mental Status: She is alert and oriented to person, place, and time.      Sensory: Sensory deficit present.      Motor: Atrophy present.      Deep Tendon Reflexes: Reflexes abnormal.      Reflex Scores:       Patellar reflexes are 1+ on the right side and 1+ on the left side.       Achilles reflexes are 1+ on the right side and 1+ on the left side.     Comments: Intact gross sensation noted to b/L LEs   Psychiatric:         Behavior: Behavior normal.               Assessment:       Encounter Diagnoses   Name Primary?    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    Diagnoses and all orders for this visit:    Type II diabetes mellitus with neurological manifestations    Onychomycosis due to  dermatophyte    Corn or callus    Hammer toes of both feet    Other orders  -     gabapentin (NEURONTIN) 300 MG capsule; Take 1 capsule (300 mg total) by mouth every evening.        I counseled the patient on her conditions, their implications and medical management.    Decision making:  Chronic illnesses discussed in detail, previous records/notes and imaging independently reviewed, prescription drug management performed in addition to lengthy discussion regarding both conservative and surgical treatment options.    Discussed options for peripheral neuropathy/nerve entrapment syndrome including nerve block therapy, surgical nerve entrapment decompression procedures, and various vitamins and supplementation available shown to improve nerve function.    Routine Foot Care    Performed by:  Alen Campa. DPM  Authorized by:  Patient     Consent Done?:  Yes (Verbal)     Nail Care Type:  Debride  Location(s): All  (Left 1st Toe, Left 3rd Toe, Left 2nd Toe, Left 4th Toe, Left 5th Toe, Right 1st Toe, Right 2nd Toe, Right 3rd Toe, Right 4th Toe and Right 5th Toe)  Patient tolerance:  Patient tolerated the procedure well with no immediate complications     With patient's permission, the toenails mentioned above were aggressively reduced and debrided using a nail nipper, removing all offending nail and debris. The patient will continue to monitor the areas daily, inspect the feet, wear protective shoe gear when ambulatory, and moisturizer to maintain skin integrity.      Callus Care Type: Debride    With patient's permission, the calluses/hyperkeratotic lesions mentioned above were aggressively reduced and debrided using a number 15 blade. The patient will continue to monitor the areas daily, inspect the feet, wear protective shoe gear when ambulatory, and moisturizer to maintain skin integrity.     Digital padding/conservative hammertoe discussion.    Shoe inspection. Diabetic Foot Education. Patient reminded of the  importance of good nutrition and blood sugar control to help prevent podiatric complications of diabetes. Patient instructed on proper foot hygeine. We discussed wearing proper shoe gear, daily foot inspections and Diabetic foot education in detail.    Return to clinic in 3-6 months or sooner if problems arise         .

## 2025-06-10 ENCOUNTER — HOSPITAL ENCOUNTER (OUTPATIENT)
Facility: HOSPITAL | Age: 89
Discharge: HOME | End: 2025-06-10
Attending: STUDENT IN AN ORGANIZED HEALTH CARE EDUCATION/TRAINING PROGRAM | Admitting: STUDENT IN AN ORGANIZED HEALTH CARE EDUCATION/TRAINING PROGRAM
Payer: MEDICARE

## 2025-06-10 VITALS
DIASTOLIC BLOOD PRESSURE: 78 MMHG | BODY MASS INDEX: 18.66 KG/M2 | RESPIRATION RATE: 18 BRPM | WEIGHT: 112 LBS | HEART RATE: 45 BPM | HEIGHT: 65 IN | SYSTOLIC BLOOD PRESSURE: 179 MMHG | OXYGEN SATURATION: 97 % | TEMPERATURE: 97 F

## 2025-06-10 DIAGNOSIS — M54.16 LUMBAR RADICULOPATHY: Primary | ICD-10-CM

## 2025-06-10 LAB
GLUCOSE SERPL-MCNC: 170 MG/DL (ref 70–110)
POCT GLUCOSE: 170 MG/DL (ref 70–110)
POCT GLUCOSE: >500 MG/DL (ref 70–110)

## 2025-06-10 PROCEDURE — 25500020 PHARM REV CODE 255: Performed by: STUDENT IN AN ORGANIZED HEALTH CARE EDUCATION/TRAINING PROGRAM

## 2025-06-10 PROCEDURE — 63600175 PHARM REV CODE 636 W HCPCS: Performed by: STUDENT IN AN ORGANIZED HEALTH CARE EDUCATION/TRAINING PROGRAM

## 2025-06-10 PROCEDURE — 64483 NJX AA&/STRD TFRM EPI L/S 1: CPT | Mod: 50,,, | Performed by: STUDENT IN AN ORGANIZED HEALTH CARE EDUCATION/TRAINING PROGRAM

## 2025-06-10 PROCEDURE — 64483 NJX AA&/STRD TFRM EPI L/S 1: CPT | Mod: 50 | Performed by: STUDENT IN AN ORGANIZED HEALTH CARE EDUCATION/TRAINING PROGRAM

## 2025-06-10 PROCEDURE — 82962 GLUCOSE BLOOD TEST: CPT | Performed by: STUDENT IN AN ORGANIZED HEALTH CARE EDUCATION/TRAINING PROGRAM

## 2025-06-10 RX ORDER — LIDOCAINE HYDROCHLORIDE 20 MG/ML
INJECTION, SOLUTION EPIDURAL; INFILTRATION; INTRACAUDAL; PERINEURAL
Status: DISCONTINUED | OUTPATIENT
Start: 2025-06-10 | End: 2025-06-10 | Stop reason: HOSPADM

## 2025-06-10 RX ORDER — MIDAZOLAM HYDROCHLORIDE 1 MG/ML
INJECTION INTRAMUSCULAR; INTRAVENOUS
Status: DISCONTINUED | OUTPATIENT
Start: 2025-06-10 | End: 2025-06-10 | Stop reason: HOSPADM

## 2025-06-10 RX ORDER — FENTANYL CITRATE 50 UG/ML
INJECTION, SOLUTION INTRAMUSCULAR; INTRAVENOUS
Status: DISCONTINUED | OUTPATIENT
Start: 2025-06-10 | End: 2025-06-10 | Stop reason: HOSPADM

## 2025-06-10 RX ORDER — DEXAMETHASONE SODIUM PHOSPHATE 10 MG/ML
INJECTION, SOLUTION INTRA-ARTICULAR; INTRALESIONAL; INTRAMUSCULAR; INTRAVENOUS; SOFT TISSUE
Status: DISCONTINUED | OUTPATIENT
Start: 2025-06-10 | End: 2025-06-10 | Stop reason: HOSPADM

## 2025-06-10 RX ORDER — SODIUM CHLORIDE 9 MG/ML
500 INJECTION, SOLUTION INTRAVENOUS CONTINUOUS
Status: DISCONTINUED | OUTPATIENT
Start: 2025-06-10 | End: 2025-06-10 | Stop reason: HOSPADM

## 2025-06-10 RX ORDER — LIDOCAINE HYDROCHLORIDE 10 MG/ML
INJECTION, SOLUTION EPIDURAL; INFILTRATION; INTRACAUDAL; PERINEURAL
Status: DISCONTINUED | OUTPATIENT
Start: 2025-06-10 | End: 2025-06-10 | Stop reason: HOSPADM

## 2025-06-10 NOTE — DISCHARGE INSTRUCTIONS
[facility]  [employer] service # [phone]  On-call pager for emergency# [phone]     POST-PROCEDURE INSTRUCTIONS:    Today you had an injection that included a steroid medications.  The steroid may or may not have been mixed with a local anesthetic when it was injected.   If the injection was in the neck, you may feel some pressure, numbness, or slight weakness in the arm after the procedure for a short period of time (this is a normal response), if this persists for longer than 1 day please contact our office or go to the emergency room.  If the injection was in the low back, you may feel some pressure, numbness, or slight weakness in the leg after the procedure for a short period of time (this is a normal response), if this persists for longer than 1 day please contact our office or go to the emergency room.  You may get side effects from the steroid.  This is not uncommon.  Symptoms include: elevated blood sugar, elevated blood pressure, headache, flushing, nausea, insomnia.  These symptoms are transient and will resolve within 1-3 days.  If symptoms last longer than this please contact our office or head to the emergency room.  Steroid medications can take anywhere from 3-14 days to take effect (rarely longer).  You may notice that your pain worsens for a short period of time after the injection, this would not be unusual due to the pressure and trauma from the needle.    If you do not have a follow up appointment scheduled, please contact my office (or the office of the physician who referred you for the procedure) to get a post-procedure follow up scheduled 2-4 weeks after the procedure.  This can be done as a virtual visit if that is more convenient for you.      What you need to do:    Keep a record of your response to the injection you had today.    How much relief did you get?   When did the relief start and how long did it last?  Were you  able to decrease the use of any of your pain medications?  Were you able to increase your level of activity?  How long did the relief last?    What to watch out for:    If you experience any of the following symptoms after your procedure, please notify the messaging service immediately (see above for contact information):   fever (increased oral temperature)   bleeding or swelling at the injection site,    drainage, rash or redness at the injection site    possible signs of infection    increased pain at the injection site   worsening of your usual pain   severe headache   new or worsening numbness    new arm and/or leg weakness, or    changes in bowel and/or bladder function: urinating or defecating on yourself and not knowing that you did it.    PLEASE FOLLOW ALL INSTRUCTIONS CAREFULLY     Do not engage in strenuous activity (e.g., lifting or pushing heavy objects or repeated bending) for 24 hours.     Do not take a bath, swim or use Jacuzzi for 24 hours after procedure. (A shower is fine).   Remove any Band-Aids when you get home.    Use cold/ice, as needed for comfort.  We recommend the use of cold therapy alternating on for 20 minutes, off for 20 minutes.    Do not apply direct heat (heating pad or heat packs) to the injection site for 24 hours.     Resume your usual medications, unless instructed otherwise by your Pain Physician.     If you are on warfarin (Coumadin) or other blood thinner, resume this medication as instructed by your prescribing Physician.    IF AT ANY POINT YOU ARE VERY CONCERNED ABOUT YOUR SYMPTOMS, PLEASE GO TO THE EMERGENCY ROOM.    If you develop worsening pain, weakness, numbness, lose bowel or bladder control (i.e., having an accident where you did not even know you had to go to the bathroom and suddenly noticed you soiled yourself), saddle anesthesia (a loss of sensation restricted to the area of the buttocks, anus and between the legs -- i.e., those parts of your body that would  touch a saddle if you were sitting on one) you need to go immediately to the emergency department for evaluation and treatment.    ----------------------------------------------------------------------------------------------------------------------------------------------------------------  If you received Sedation please read the following instructions:  POST SEDATION INSTRUCTIONS    Today you received intravenous medication (also known as sedation) that was used to help you relax and/or decrease discomfort during your procedure. This medication will be acting in your body for the next 24 hours, so you might feel a little tired or sleepy. This feeling will slowly wear off.   Common side effects associated with these medications include: drowsiness, dizziness, sleepiness, confusion, feeling excited, difficulty remembering things, lack of steadiness with walking or balance, loss of fine muscle control, slowed reflexes, difficulty focusing, and blurred vision.  Some over-the-counter and prescription medications (e.g., muscle relaxants, opioids, mood-altering medications, sedatives/hypnotics, antihistamines) can interact with the intravenous medication you received and cause an increased risk of the side effects listed above in addition to other potentially life threatening side effects. Use extreme caution if you are taking such medications, and consult with your Pain Physician or prescribing physician if you have any questions.  For the next 12-24 hours:    DO NOT--Drive a car, operate machinery or power tools   DO NOT--Drink any alcoholic beverages (not even beer), they may dangerously increase the risk of side effects.    DO NOT--Make any important legal or business decisions or sign important documents.  We advise you to have someone to assist you at home. Move slowly and carefully. Do not make sudden changes in position. Be aware of dizziness or light-headedness and move accordingly.   If you seek medical  treatment within 24 hours, let the nurse or doctor caring for you know that you have received the above medications. If you have any questions or concerns related to your sedation or treatment today please contact us.

## 2025-06-10 NOTE — OP NOTE
"PROCEDURE: bilateral Lumbar L4-5 Transforaminal Epidural Steroid Injection    Patient Name: Sussy Costa  MRN: [id number]    PROCEDURE DATE: 6/10/2025    INJECTION # 1    DIAGNOSIS: Lumbar Radiculopathy  CPT CODE: 46990 (INJECTION(S), ANESTHETIC AGENT(S) AND/OR STEROID; TRANSFORAMINAL EPIDURAL, WITH IMAGING GUIDANCE (FLUOROSCOPY OR CT), LUMBAR OR SACRAL, SINGLE LEVEL), 27668 (Each additional level).     POSTPROCEDURE DIAGNOSIS: Same    PHYSICIAN: Silver Rosario DO  NEEDLE TYPE: - 25G 3.5" Spinal Needle  MEDICATIONS INJECTED: 6cc mixture of Dexamethasone (10mg/1ml) and 5cc Lidocaine 1% PF split equally between levels  CONTRAST: Omni 300    Sedation Medications - Mild Sedation with 1mg Versed and 50 mcg Fentanyl    Estimated Blood Loss - <2ml  Drains: None  Specimens Removed: None  Urine Output - Not Measured  Complications: None  Outcome: Good    Informed Consent:  The patient's condition and proposed procedures, risks, and alternatives were discussed with the patient or responsible party.  The patient's / responsible party's questions were answered.   The patient / responsible party appeared to understand and chose to proceed.  Informed consent was obtained.  After obtaining written consent, an IV hep lock was placed. (See nurses notes for details).     Procedure in Detail:  The patient was taken back to the OR suite and placed in a prone position. The skin overlying the injection site was prepped and draped in an aseptic fashion. The target injection site (see above) was identified with fluoroscopy.     Procedural Pause:  A procedural pause verifying correct patient, medical record number, allergies, medications to be administered, current vital signs, and surgical site was performed immediately prior to beginning the procedure.    The skin and subcutaneous tissue overlying the target site(s) of injection for the L4-5 transforaminal epidural steroid injection was/were anesthetized using 4 mL of 1% lidocaine " with a 25-gauge, 1½-inch needle.      The fluoroscopic beam was aligned to create a tunnel view.  The above noted needle was advanced parallel to the fluoroscopic beam towards the above noted foramen under fluoroscopic guidance.  The final position of the needle(s) was identified using AP and lateral views.  Paresthesias were not noted with final needle positioning.       A microbore extension tubing was attached to the needle to minimize any movement of the needle during injection or syringe change.  After negative aspiration for heme or CSF at each site(s) where the needle(s) was placed, 1ml of contrast dye was injected to confirm appropriate placement and that there was no vascular uptake.  Pain provocation by the injected contrast material was not noted.  Then the injectate solution described above was injected in increments.  The needle was then retracted approximately custodial and the needle track was flushed with 0.5 mL of Lidocaine 1%.  The needle(s) was then removed.     The same procedural technique outlined above was repeated on the OPPOSITE side.    The heart rate, pulse oximetry, and blood pressure were continuously monitored throughout the procedure.  The procedure was well tolerated. She was carefully escorted to the recovery room in stable condition. Patient was monitored by RN for recovery period.  The patient will be contacted in the next few days to determine extent of relief.  Patient was given post procedure and discharge instructions to follow at home.  The patient was discharged in a stable condition.    Note Electronically Signed By:  Silver Ojeda  06/10/2025

## 2025-06-10 NOTE — DISCHARGE SUMMARY
[facility]  Discharge Note  Short Stay    Procedure(s) (LRB):  TFESI B/L L4-5 (Bilateral)      OUTCOME: Patient tolerated treatment/procedure well without complication and is now ready for discharge.    DISPOSITION: Home or Self Care    FINAL DIAGNOSIS:  <principal problem not specified>    FOLLOWUP: In clinic    DISCHARGE INSTRUCTIONS:  No discharge procedures on file.     TIME SPENT ON DISCHARGE: 10 minutes

## 2025-06-16 ENCOUNTER — TELEPHONE (OUTPATIENT)
Dept: PAIN MEDICINE | Facility: CLINIC | Age: 89
End: 2025-06-16
Payer: MEDICARE

## 2025-06-26 ENCOUNTER — INFUSION (OUTPATIENT)
Dept: INFECTIOUS DISEASES | Facility: HOSPITAL | Age: 89
End: 2025-06-26
Payer: MEDICARE

## 2025-06-26 VITALS
DIASTOLIC BLOOD PRESSURE: 68 MMHG | SYSTOLIC BLOOD PRESSURE: 149 MMHG | HEART RATE: 38 BPM | WEIGHT: 115.88 LBS | RESPIRATION RATE: 18 BRPM | HEIGHT: 65 IN | OXYGEN SATURATION: 97 % | TEMPERATURE: 98 F | BODY MASS INDEX: 19.31 KG/M2

## 2025-06-26 DIAGNOSIS — M81.0 SENILE OSTEOPOROSIS: Primary | ICD-10-CM

## 2025-06-26 PROCEDURE — 96372 THER/PROPH/DIAG INJ SC/IM: CPT

## 2025-06-26 PROCEDURE — 63600175 PHARM REV CODE 636 W HCPCS: Mod: JZ,TB | Performed by: NURSE PRACTITIONER

## 2025-06-26 RX ADMIN — ROMOSOZUMAB-AQQG 210 MG: 105 INJECTION, SOLUTION SUBCUTANEOUS at 11:06

## 2025-06-26 NOTE — PROGRESS NOTES
Patient arrives for Evenity (2 separate injections) injection - confirms use of calcium and vitamin D supplements and denies dental procedures over past 3 months - administered per guidelines     Patient states that she had wisdom teeth removal on 05/20/2025. She has received clearance from oral surgeon and GHISLAINE Gonzalez to continue receiving Evenity injections. See progress note from 04/25/2025 visit.    Limited head-to-toe assessment due to privacy issues and visit reason though the opportunity was given for patient to express any concerns.    Patient arrives for evenity as ordered by WELLINGTON Ye. All benefits, risks, requirements, and alternatives should have been discussed with patient by ordering provider at the time the order was placed.

## 2025-06-27 DIAGNOSIS — I50.32 CHRONIC HEART FAILURE WITH PRESERVED EJECTION FRACTION (HFPEF): ICD-10-CM

## 2025-06-27 DIAGNOSIS — I51.89 DIASTOLIC DYSFUNCTION: Chronic | ICD-10-CM

## 2025-06-27 NOTE — TELEPHONE ENCOUNTER
----- Message from Med Assistant Patrick sent at 6/27/2025  3:13 PM CDT -----  Regarding: FW: refill  Jardiance not on pt med list please help  ----- Message -----  From: Nancy Garcia  Sent: 6/27/2025   1:40 PM CDT  To: Alejandro Umanzor Staff  Subject: refill                                           Pt is requesting a refill of jardiance 10mg to be sent to [facility] - [place], [place]  [address] [facility]   Phone: [phone]  Fax: [phone]    Pt is requesting a 90 day supply    LOV 6/21/24    Thank you

## 2025-07-02 DIAGNOSIS — I50.32 CHRONIC HEART FAILURE WITH PRESERVED EJECTION FRACTION (HFPEF): ICD-10-CM

## 2025-07-02 DIAGNOSIS — I51.89 DIASTOLIC DYSFUNCTION: Chronic | ICD-10-CM

## 2025-07-02 NOTE — TELEPHONE ENCOUNTER
Refill Decision Note   Sussy Costa  is requesting a refill authorization.  Brief Assessment and Rationale for Refill:  Quick Discontinue     Medication Therapy Plan:        Comments:     Note composed:3:32 PM 07/02/2025

## 2025-07-02 NOTE — TELEPHONE ENCOUNTER
Refill Decision Note   Sussy Costa  is requesting a refill authorization.  Brief Assessment and Rationale for Refill:  Approve     Medication Therapy Plan:        Comments:     Note composed:3:31 PM 07/02/2025

## 2025-07-09 NOTE — TELEPHONE ENCOUNTER
Refill Routing Note   Medication(s) are not appropriate for processing by [facility] for the following reason(s):        Non-participating provider  No active prescription written by listed primary provider    [facility] action(s):  Route             Appointments  past 12m or future 3m with PCP    Date Provider   Last Visit   6/26/2024 Rosangela Steel MD   Next Visit   Visit date not found Rosangela Steel MD   ED visits in past 90 days: 0        Note composed:2:26 PM 07/09/2025                No

## 2025-07-10 RX ORDER — MONTELUKAST SODIUM 10 MG/1
10 TABLET ORAL NIGHTLY
Qty: 90 TABLET | Refills: 1 | Status: SHIPPED | OUTPATIENT
Start: 2025-07-10

## 2025-07-28 DIAGNOSIS — R80.9 TYPE 2 DIABETES MELLITUS WITH MICROALBUMINURIA, WITHOUT LONG-TERM CURRENT USE OF INSULIN: Chronic | ICD-10-CM

## 2025-07-28 DIAGNOSIS — E11.29 TYPE 2 DIABETES MELLITUS WITH MICROALBUMINURIA, WITHOUT LONG-TERM CURRENT USE OF INSULIN: Chronic | ICD-10-CM

## 2025-07-29 ENCOUNTER — INFUSION (OUTPATIENT)
Dept: INFECTIOUS DISEASES | Facility: HOSPITAL | Age: 89
End: 2025-07-29
Payer: MEDICARE

## 2025-07-29 VITALS
HEART RATE: 80 BPM | WEIGHT: 111.75 LBS | SYSTOLIC BLOOD PRESSURE: 176 MMHG | DIASTOLIC BLOOD PRESSURE: 83 MMHG | HEIGHT: 65 IN | BODY MASS INDEX: 18.62 KG/M2 | RESPIRATION RATE: 18 BRPM | OXYGEN SATURATION: 100 % | TEMPERATURE: 99 F

## 2025-07-29 DIAGNOSIS — M81.0 SENILE OSTEOPOROSIS: Primary | ICD-10-CM

## 2025-07-29 PROCEDURE — 63600175 PHARM REV CODE 636 W HCPCS: Mod: JZ,TB | Performed by: NURSE PRACTITIONER

## 2025-07-29 PROCEDURE — 96372 THER/PROPH/DIAG INJ SC/IM: CPT

## 2025-07-29 RX ORDER — INSULIN GLARGINE 100 [IU]/ML
5 INJECTION, SOLUTION SUBCUTANEOUS NIGHTLY
Qty: 10 ML | Refills: 2 | Status: SHIPPED | OUTPATIENT
Start: 2025-07-29

## 2025-07-29 RX ADMIN — ROMOSOZUMAB-AQQG 210 MG: 105 INJECTION, SOLUTION SUBCUTANEOUS at 01:07

## 2025-07-29 NOTE — PROGRESS NOTES
Patient arrives for Evenity (2 separate injections) injection - confirms use of vitamin D supplements and denies dental procedures over past 3 months - administered per guidelines     Patient states that she had wisdom teeth removal on 05/20/2025. She has received clearance from oral surgeon and GHISLAINE Gonzalez to continue receiving Evenity injections. See progress note from 04/25/2025 visit.    Limited head-to-toe assessment due to privacy issues and visit reason though the opportunity was given for patient to express any concerns.    Patient arrives for evenity as ordered by WELLINGTON Ye. All benefits, risks, requirements, and alternatives should have been discussed with patient by ordering provider at the time the order was placed.

## 2025-08-06 DIAGNOSIS — I50.32 CHRONIC HEART FAILURE WITH PRESERVED EJECTION FRACTION (HFPEF): ICD-10-CM

## 2025-08-06 DIAGNOSIS — I51.89 DIASTOLIC DYSFUNCTION: Chronic | ICD-10-CM

## 2025-08-06 DIAGNOSIS — I10 PRIMARY HYPERTENSION: Chronic | ICD-10-CM

## 2025-08-06 NOTE — TELEPHONE ENCOUNTER
Care Due:                  Date            Visit Type   Department     Provider  --------------------------------------------------------------------------------                                EP -                              PRIMARY      MET INTERNAL  Last Visit: 04-      CARE ([facility])   MEDICINE       Leon Ramírez  Next Visit: None Scheduled  None         None Found                                                            Last  Test          Frequency    Reason                     Performed    Due Date  --------------------------------------------------------------------------------    HBA1C.......  6 months...  empagliflozin............  01- 07-    Lipid Panel.  12 months..  atorvastatin.............  09- 09-    [facility] Embedded Care Due Messages. Reference number: [id number].   8/06/2025 11:27:31 AM CDT

## 2025-08-06 NOTE — TELEPHONE ENCOUNTER
Refill Routing Note   Medication(s) are not appropriate for processing by [facility] for the following reason(s):        Required vitals abnormal    [facility] action(s):  Defer     Requires labs : Yes             Appointments  past 12m or future 3m with PCP    Date Provider   Last Visit   4/23/2025 Leon Ramírez MD   Next Visit   Visit date not found Leon Ramírez MD   ED visits in past 90 days: 0        Note composed:5:21 PM 08/06/2025

## 2025-08-07 RX ORDER — LOSARTAN POTASSIUM 100 MG/1
100 TABLET ORAL
Qty: 90 TABLET | Refills: 2 | Status: SHIPPED | OUTPATIENT
Start: 2025-08-07

## 2025-08-08 ENCOUNTER — PATIENT OUTREACH (OUTPATIENT)
Dept: ADMINISTRATIVE | Facility: HOSPITAL | Age: 89
End: 2025-08-08
Payer: MEDICARE

## 2025-08-08 NOTE — LETTER
AUTHORIZATION FOR RELEASE OF   CONFIDENTIAL INFORMATION    Dear [facility],    We are seeing Sussy Costa, date of birth 1935, in the clinic at [facility]. Leon Ramírez MD is the patient's PCP. Sussy Costa has an outstanding lab/procedure at the time we reviewed her chart. In order to help keep her health information updated, she has authorized us to request the following medical record(s):        (  )  MAMMOGRAM                                      (  )  COLONOSCOPY      (  )  PAP SMEAR                                          (  )  OUTSIDE LAB RESULTS     (  )  DEXA SCAN                                          ( x )  EYE EXAM            (  )  FOOT EXAM                                          (  )  ENTIRE RECORD     (  )  OUTSIDE IMMUNIZATIONS                 (  )  _______________         Please fax records to Leon Ramírez MD,  at [phone] or email to [email or web address].        Patient Name: Sussy Costa  : 1935  Patient Phone #: [phone]

## 2025-08-12 ENCOUNTER — TELEPHONE (OUTPATIENT)
Dept: HEMATOLOGY/ONCOLOGY | Facility: CLINIC | Age: 89
End: 2025-08-12
Payer: MEDICARE

## 2025-08-13 ENCOUNTER — HOSPITAL ENCOUNTER (INPATIENT)
Facility: HOSPITAL | Age: 89
LOS: 2 days | Discharge: HOME | DRG: 228 | End: 2025-08-15
Attending: STUDENT IN AN ORGANIZED HEALTH CARE EDUCATION/TRAINING PROGRAM | Admitting: INTERNAL MEDICINE
Payer: MEDICARE

## 2025-08-13 DIAGNOSIS — I49.9 ARRHYTHMIA: ICD-10-CM

## 2025-08-13 DIAGNOSIS — M25.552 LEFT HIP PAIN: ICD-10-CM

## 2025-08-13 DIAGNOSIS — R07.9 CHEST PAIN: ICD-10-CM

## 2025-08-13 DIAGNOSIS — I50.9 CHF (CONGESTIVE HEART FAILURE): ICD-10-CM

## 2025-08-13 DIAGNOSIS — M79.89 SWELLING OF LOWER EXTREMITY: ICD-10-CM

## 2025-08-13 DIAGNOSIS — I44.2 CHB (COMPLETE HEART BLOCK): ICD-10-CM

## 2025-08-13 DIAGNOSIS — Z95.9 CARDIAC DEVICE IN SITU: ICD-10-CM

## 2025-08-13 DIAGNOSIS — R06.02 SHORTNESS OF BREATH: ICD-10-CM

## 2025-08-13 DIAGNOSIS — I50.43 ACUTE ON CHRONIC COMBINED SYSTOLIC AND DIASTOLIC HEART FAILURE: ICD-10-CM

## 2025-08-13 DIAGNOSIS — R21 SKIN RASH: ICD-10-CM

## 2025-08-13 DIAGNOSIS — R00.1 SYMPTOMATIC BRADYCARDIA: Primary | ICD-10-CM

## 2025-08-13 DIAGNOSIS — R55 SYNCOPE AND COLLAPSE: ICD-10-CM

## 2025-08-13 DIAGNOSIS — I50.9 CONGESTIVE HEART FAILURE, UNSPECIFIED HF CHRONICITY, UNSPECIFIED HEART FAILURE TYPE: ICD-10-CM

## 2025-08-13 DIAGNOSIS — I48.0 PAF (PAROXYSMAL ATRIAL FIBRILLATION): ICD-10-CM

## 2025-08-13 PROBLEM — I50.32 CHRONIC HEART FAILURE WITH PRESERVED EJECTION FRACTION (HFPEF): Chronic | Status: RESOLVED | Noted: 2025-01-29 | Resolved: 2025-08-13

## 2025-08-13 LAB
ABSOLUTE EOSINOPHIL (OHS): 1.72 K/UL
ABSOLUTE MONOCYTE (OHS): 0.76 K/UL (ref 0.3–1)
ABSOLUTE NEUTROPHIL COUNT (OHS): 6.99 K/UL (ref 1.8–7.7)
ALBUMIN SERPL BCP-MCNC: 4.2 G/DL (ref 3.5–5.2)
ALP SERPL-CCNC: 151 UNIT/L (ref 40–150)
ALT SERPL W/O P-5'-P-CCNC: 36 UNIT/L (ref 0–55)
ANION GAP (OHS): 14 MMOL/L (ref 8–16)
AORTIC SIZE INDEX (SOV): 1.8 CM/M2
AORTIC SIZE INDEX: 2 CM/M2
ASCENDING AORTA: 3.1 CM
AST SERPL-CCNC: 50 UNIT/L (ref 0–50)
AV AREA BY CONTINUOUS VTI: 1.4 CM2
AV INDEX (PROSTH): 0.48
AV LVOT MEAN GRADIENT: 3 MMHG
AV LVOT PEAK GRADIENT: 5 MMHG
AV MEAN GRADIENT: 10 MMHG
AV PEAK GRADIENT: 18 MMHG
AV REGURGITATION PRESSURE HALF TIME: 472 MS
AV VALVE AREA BY VELOCITY RATIO: 1.5 CM²
AV VALVE AREA: 1.4 CM2
AV VELOCITY RATIO: 0.52
BASOPHILS # BLD AUTO: 0.08 K/UL
BASOPHILS NFR BLD AUTO: 0.7 %
BILIRUB SERPL-MCNC: 0.7 MG/DL (ref 0.1–1)
BILIRUB UR QL STRIP.AUTO: NEGATIVE
BIPAP: 0
BSA FOR ECHO PROCEDURE: 1.52 M2
BUN SERPL-MCNC: 20 MG/DL (ref 8–23)
CALCIUM SERPL-MCNC: 10.1 MG/DL (ref 8.7–10.5)
CHLORIDE SERPL-SCNC: 112 MMOL/L (ref 95–110)
CLARITY UR: CLEAR
CO2 SERPL-SCNC: 17 MMOL/L (ref 23–29)
COLOR UR AUTO: COLORLESS
CREAT SERPL-MCNC: 1 MG/DL (ref 0.5–1.4)
CV ECHO LV RWT: 0.45 CM
DOP CALC AO PEAK VEL: 2.1 M/S
DOP CALC AO VTI: 42.4 CM
DOP CALC LVOT AREA: 2.8 CM2
DOP CALC LVOT DIAMETER: 1.9 CM
DOP CALC LVOT PEAK VEL: 1.1 M/S
DOP CALCLVOT PEAK VEL VTI: 20.2 CM
E WAVE DECELERATION TIME: 267 MS
E/A RATIO: 0.98
E/E' RATIO: 16 M/S
ECHO EF ESTIMATED: 55 %
ECHO LV POSTERIOR WALL: 0.9 CM (ref 0.6–1.1)
EJECTION FRACTION: 55 %
ERYTHROCYTE [DISTWIDTH] IN BLOOD BY AUTOMATED COUNT: 14.5 % (ref 11.5–14.5)
FRACTIONAL SHORTENING: 27.5 % (ref 28–44)
GFR SERPLBLD CREATININE-BSD FMLA CKD-EPI: 54 ML/MIN/1.73/M2
GLUCOSE SERPL-MCNC: 106 MG/DL (ref 70–110)
GLUCOSE UR QL STRIP: ABNORMAL
HCT VFR BLD AUTO: 40 % (ref 37–48.5)
HCT VFR BLD CALC: 42.4 % (ref 36–54)
HCV AB SERPL QL IA: NORMAL
HGB BLD-MCNC: 12.8 GM/DL (ref 12–16)
HGB UR QL STRIP: NEGATIVE
HIV 1+2 AB+HIV1 P24 AG SERPL QL IA: NORMAL
IMM GRANULOCYTES # BLD AUTO: 0.04 K/UL (ref 0–0.04)
IMM GRANULOCYTES NFR BLD AUTO: 0.4 % (ref 0–0.5)
INTERVENTRICULAR SEPTUM: 0.8 CM (ref 0.6–1.1)
KETONES UR QL STRIP: NEGATIVE
LA MAJOR: 5.5 CM
LA MINOR: 5.7 CM
LA WIDTH: 2.2 CM
LDH SERPL L TO P-CCNC: 0.9 MMOL/L (ref 0.5–2.2)
LEFT ATRIUM SIZE: 2.6 CM
LEFT ATRIUM VOLUME INDEX MOD: 27 ML/M2
LEFT ATRIUM VOLUME INDEX: 18 ML/M2
LEFT ATRIUM VOLUME MOD: 42 ML
LEFT ATRIUM VOLUME: 27 CM3
LEFT INTERNAL DIMENSION IN SYSTOLE: 2.9 CM (ref 2.1–4)
LEFT VENTRICLE DIASTOLIC VOLUME INDEX: 44.16 ML/M2
LEFT VENTRICLE DIASTOLIC VOLUME: 68 ML
LEFT VENTRICLE MASS INDEX: 65.9 G/M2
LEFT VENTRICLE SYSTOLIC VOLUME INDEX: 20.1 ML/M2
LEFT VENTRICLE SYSTOLIC VOLUME: 31 ML
LEFT VENTRICULAR INTERNAL DIMENSION IN DIASTOLE: 4 CM (ref 3.5–6)
LEFT VENTRICULAR MASS: 101.4 G
LEUKOCYTE ESTERASE UR QL STRIP: NEGATIVE
LV LATERAL E/E' RATIO: 11.3
LV SEPTAL E/E' RATIO: 24.8
LYMPHOCYTES # BLD AUTO: 1.68 K/UL (ref 1–4.8)
Lab: 1.7 CM/M
Lab: 1.9 CM/M
MCH RBC QN AUTO: 31 PG (ref 27–31)
MCHC RBC AUTO-ENTMCNC: 32 G/DL (ref 32–36)
MCV RBC AUTO: 97 FL (ref 82–98)
MICROSCOPIC COMMENT: NORMAL
MV MEAN GRADIENT: 4 MMHG
MV PEAK A VEL: 1.26 M/S
MV PEAK E VEL: 1.24 M/S
MV PEAK GRADIENT: 8 MMHG
MV STENOSIS PRESSURE HALF TIME: 107 MS
MV VALVE AREA P 1/2 METHOD: 2.06 CM2
NITRITE UR QL STRIP: NEGATIVE
NT-PROBNP SERPL-MCNC: 3066 PG/ML
NUCLEATED RBC (/100WBC) (OHS): 0 /100 WBC
OHS CV CPX PATIENT HEIGHT IN: 65
OHS CV RV/LV RATIO: 0.78 CM
OHS QRS DURATION: 110 MS
OHS QRS DURATION: 116 MS
OHS QRS DURATION: 120 MS
OHS QTC CALCULATION: 502 MS
OHS QTC CALCULATION: 508 MS
OHS QTC CALCULATION: 528 MS
PCO2 BLDA: 32.7 MMHG (ref 35–45)
PH SMN: 7.47 [PH] (ref 7.35–7.45)
PH UR STRIP: 6 [PH]
PISA TR MAX VEL: 2.5 M/S
PLATELET # BLD AUTO: 261 K/UL (ref 150–450)
PMV BLD AUTO: 11.4 FL (ref 9.2–12.9)
PO2 BLDA: 39.2 MMHG (ref 40–60)
POC BASE DEFICIT: 0.5 MMOL/L
POC HCO3: 24.4 MMOL/L (ref 24–28)
POC IONIZED CALCIUM: 1.11 MMOL/L (ref 1.06–1.42)
POC PERFORMED BY: ABNORMAL
POCT GLUCOSE: 169 MG/DL (ref 70–110)
POCT GLUCOSE: 183 MG/DL (ref 70–110)
POCT GLUCOSE: 203 MG/DL (ref 70–110)
POCT GLUCOSE: 210 MG/DL (ref 70–110)
POCT GLUCOSE: 224 MG/DL (ref 70–110)
POTASSIUM BLD-SCNC: 3.5 MMOL/L (ref 3.5–5.1)
POTASSIUM SERPL-SCNC: 4 MMOL/L (ref 3.5–5.1)
PROT SERPL-MCNC: 7.5 GM/DL (ref 6–8.4)
PROT UR QL STRIP: NEGATIVE
RA MAJOR: 5.04 CM
RA PRESSURE ESTIMATED: 8 MMHG
RA WIDTH: 3.49 CM
RBC # BLD AUTO: 4.13 M/UL (ref 4–5.4)
RBC #/AREA URNS AUTO: 1 /HPF (ref 0–4)
RELATIVE EOSINOPHIL (OHS): 15.3 %
RELATIVE LYMPHOCYTE (OHS): 14.9 % (ref 18–48)
RELATIVE MONOCYTE (OHS): 6.7 % (ref 4–15)
RELATIVE NEUTROPHIL (OHS): 62 % (ref 38–73)
RIGHT VENTRICLE DIASTOLIC BASEL DIMENSION: 3.1 CM
RV TB RVSP: 11 MMHG
SINUS: 2.8 CM
SODIUM BLD-SCNC: 144 MMOL/L (ref 136–145)
SODIUM SERPL-SCNC: 143 MMOL/L (ref 136–145)
SP GR UR STRIP: 1
SPECIMEN SOURCE: ABNORMAL
STJ: 2.4 CM
TDI LATERAL: 0.11 M/S
TDI SEPTAL: 0.05 M/S
TDI: 0.08 M/S
TRICUSPID ANNULAR PLANE SYSTOLIC EXCURSION: 2.2 CM
TROPONIN I SERPL HS-MCNC: 20 NG/L
TROPONIN I SERPL HS-MCNC: 21 NG/L
TV PEAK GRADIENT: 26 MMHG
TV REST PULMONARY ARTERY PRESSURE: 33 MMHG
UROBILINOGEN UR STRIP-ACNC: NEGATIVE EU/DL
WBC # BLD AUTO: 11.27 K/UL (ref 3.9–12.7)
Z-SCORE OF LEFT VENTRICULAR DIMENSION IN END DIASTOLE: -1.12
Z-SCORE OF LEFT VENTRICULAR DIMENSION IN END SYSTOLE: 0.34

## 2025-08-13 PROCEDURE — 84484 ASSAY OF TROPONIN QUANT: CPT | Performed by: STUDENT IN AN ORGANIZED HEALTH CARE EDUCATION/TRAINING PROGRAM

## 2025-08-13 PROCEDURE — 63600175 PHARM REV CODE 636 W HCPCS

## 2025-08-13 PROCEDURE — 83605 ASSAY OF LACTIC ACID: CPT

## 2025-08-13 PROCEDURE — 97162 PT EVAL MOD COMPLEX 30 MIN: CPT

## 2025-08-13 PROCEDURE — 99900035 HC TECH TIME PER 15 MIN (STAT)

## 2025-08-13 PROCEDURE — 80053 COMPREHEN METABOLIC PANEL: CPT | Performed by: STUDENT IN AN ORGANIZED HEALTH CARE EDUCATION/TRAINING PROGRAM

## 2025-08-13 PROCEDURE — 81001 URINALYSIS AUTO W/SCOPE: CPT

## 2025-08-13 PROCEDURE — 97535 SELF CARE MNGMENT TRAINING: CPT

## 2025-08-13 PROCEDURE — 82803 BLOOD GASES ANY COMBINATION: CPT

## 2025-08-13 PROCEDURE — 94761 N-INVAS EAR/PLS OXIMETRY MLT: CPT

## 2025-08-13 PROCEDURE — 97165 OT EVAL LOW COMPLEX 30 MIN: CPT

## 2025-08-13 PROCEDURE — 96375 TX/PRO/DX INJ NEW DRUG ADDON: CPT

## 2025-08-13 PROCEDURE — 93005 ELECTROCARDIOGRAM TRACING: CPT

## 2025-08-13 PROCEDURE — 97530 THERAPEUTIC ACTIVITIES: CPT

## 2025-08-13 PROCEDURE — 87389 HIV-1 AG W/HIV-1&-2 AB AG IA: CPT | Performed by: EMERGENCY MEDICINE

## 2025-08-13 PROCEDURE — 99223 1ST HOSP IP/OBS HIGH 75: CPT | Mod: ,,, | Performed by: INTERNAL MEDICINE

## 2025-08-13 PROCEDURE — 11000001 HC ACUTE MED/SURG PRIVATE ROOM

## 2025-08-13 PROCEDURE — 83880 ASSAY OF NATRIURETIC PEPTIDE: CPT | Performed by: STUDENT IN AN ORGANIZED HEALTH CARE EDUCATION/TRAINING PROGRAM

## 2025-08-13 PROCEDURE — 0HQ0XZZ REPAIR SCALP SKIN, EXTERNAL APPROACH: ICD-10-PCS | Performed by: STUDENT IN AN ORGANIZED HEALTH CARE EDUCATION/TRAINING PROGRAM

## 2025-08-13 PROCEDURE — 86803 HEPATITIS C AB TEST: CPT | Performed by: EMERGENCY MEDICINE

## 2025-08-13 PROCEDURE — 96374 THER/PROPH/DIAG INJ IV PUSH: CPT

## 2025-08-13 PROCEDURE — 99285 EMERGENCY DEPT VISIT HI MDM: CPT | Mod: 25

## 2025-08-13 PROCEDURE — 25000003 PHARM REV CODE 250

## 2025-08-13 PROCEDURE — 63600175 PHARM REV CODE 636 W HCPCS: Performed by: STUDENT IN AN ORGANIZED HEALTH CARE EDUCATION/TRAINING PROGRAM

## 2025-08-13 PROCEDURE — 84484 ASSAY OF TROPONIN QUANT: CPT

## 2025-08-13 PROCEDURE — 36415 COLL VENOUS BLD VENIPUNCTURE: CPT

## 2025-08-13 PROCEDURE — 85025 COMPLETE CBC W/AUTO DIFF WBC: CPT | Performed by: STUDENT IN AN ORGANIZED HEALTH CARE EDUCATION/TRAINING PROGRAM

## 2025-08-13 RX ORDER — IBUPROFEN 200 MG
16 TABLET ORAL
Status: DISCONTINUED | OUTPATIENT
Start: 2025-08-13 | End: 2025-08-15 | Stop reason: HOSPADM

## 2025-08-13 RX ORDER — FUROSEMIDE 10 MG/ML
40 INJECTION INTRAMUSCULAR; INTRAVENOUS
Status: COMPLETED | OUTPATIENT
Start: 2025-08-13 | End: 2025-08-13

## 2025-08-13 RX ORDER — FUROSEMIDE 40 MG/1
40 TABLET ORAL 2 TIMES DAILY PRN
Start: 2025-08-13 | End: 2026-08-13

## 2025-08-13 RX ORDER — IBUPROFEN 200 MG
16 TABLET ORAL
Status: DISCONTINUED | OUTPATIENT
Start: 2025-08-13 | End: 2025-08-14

## 2025-08-13 RX ORDER — LABETALOL HYDROCHLORIDE 5 MG/ML
10 INJECTION, SOLUTION INTRAVENOUS
Status: COMPLETED | OUTPATIENT
Start: 2025-08-13 | End: 2025-08-13

## 2025-08-13 RX ORDER — ATORVASTATIN CALCIUM 20 MG/1
20 TABLET, FILM COATED ORAL NIGHTLY
Status: DISCONTINUED | OUTPATIENT
Start: 2025-08-13 | End: 2025-08-15 | Stop reason: HOSPADM

## 2025-08-13 RX ORDER — NALOXONE HCL 0.4 MG/ML
0.02 VIAL (ML) INJECTION
Status: DISCONTINUED | OUTPATIENT
Start: 2025-08-13 | End: 2025-08-15 | Stop reason: HOSPADM

## 2025-08-13 RX ORDER — GLUCAGON 1 MG
1 KIT INJECTION
Status: DISCONTINUED | OUTPATIENT
Start: 2025-08-13 | End: 2025-08-15 | Stop reason: HOSPADM

## 2025-08-13 RX ORDER — ONDANSETRON HYDROCHLORIDE 2 MG/ML
INJECTION, SOLUTION INTRAVENOUS
Status: DISPENSED
Start: 2025-08-13 | End: 2025-08-13

## 2025-08-13 RX ORDER — AMOXICILLIN 250 MG
1 CAPSULE ORAL DAILY PRN
Status: DISCONTINUED | OUTPATIENT
Start: 2025-08-13 | End: 2025-08-15 | Stop reason: HOSPADM

## 2025-08-13 RX ORDER — SODIUM CHLORIDE 0.9 % (FLUSH) 0.9 %
10 SYRINGE (ML) INJECTION
Status: DISCONTINUED | OUTPATIENT
Start: 2025-08-13 | End: 2025-08-15 | Stop reason: HOSPADM

## 2025-08-13 RX ORDER — HYDROCORTISONE 25 MG/G
CREAM TOPICAL
Start: 2025-08-13

## 2025-08-13 RX ORDER — POLYETHYLENE GLYCOL 3350 17 G/17G
17 POWDER, FOR SOLUTION ORAL DAILY
Status: DISCONTINUED | OUTPATIENT
Start: 2025-08-13 | End: 2025-08-15 | Stop reason: HOSPADM

## 2025-08-13 RX ORDER — PREGABALIN 25 MG/1
50 CAPSULE ORAL DAILY
Status: DISCONTINUED | OUTPATIENT
Start: 2025-08-13 | End: 2025-08-15 | Stop reason: HOSPADM

## 2025-08-13 RX ORDER — INSULIN ASPART 100 [IU]/ML
3 INJECTION, SOLUTION INTRAVENOUS; SUBCUTANEOUS
Status: DISCONTINUED | OUTPATIENT
Start: 2025-08-13 | End: 2025-08-15 | Stop reason: HOSPADM

## 2025-08-13 RX ORDER — SODIUM CHLORIDE 0.9 % (FLUSH) 0.9 %
10 SYRINGE (ML) INJECTION EVERY 12 HOURS PRN
Status: DISCONTINUED | OUTPATIENT
Start: 2025-08-13 | End: 2025-08-15 | Stop reason: HOSPADM

## 2025-08-13 RX ORDER — ACETAMINOPHEN 500 MG
1000 TABLET ORAL
Start: 2025-08-13

## 2025-08-13 RX ORDER — INSULIN ASPART 100 [IU]/ML
0-10 INJECTION, SOLUTION INTRAVENOUS; SUBCUTANEOUS EVERY 6 HOURS PRN
Status: DISCONTINUED | OUTPATIENT
Start: 2025-08-13 | End: 2025-08-13

## 2025-08-13 RX ORDER — INSULIN GLARGINE 100 [IU]/ML
5 INJECTION, SOLUTION SUBCUTANEOUS NIGHTLY
Status: DISCONTINUED | OUTPATIENT
Start: 2025-08-13 | End: 2025-08-15 | Stop reason: HOSPADM

## 2025-08-13 RX ORDER — LOSARTAN POTASSIUM 50 MG/1
100 TABLET ORAL DAILY
Status: DISCONTINUED | OUTPATIENT
Start: 2025-08-13 | End: 2025-08-15 | Stop reason: HOSPADM

## 2025-08-13 RX ORDER — LANOLIN ALCOHOL/MO/W.PET/CERES
400 CREAM (GRAM) TOPICAL 2 TIMES DAILY
Status: DISCONTINUED | OUTPATIENT
Start: 2025-08-13 | End: 2025-08-15 | Stop reason: HOSPADM

## 2025-08-13 RX ORDER — FUROSEMIDE 10 MG/ML
40 INJECTION INTRAMUSCULAR; INTRAVENOUS 2 TIMES DAILY
Status: DISCONTINUED | OUTPATIENT
Start: 2025-08-13 | End: 2025-08-14

## 2025-08-13 RX ORDER — LATANOPROST 50 UG/ML
1 SOLUTION/ DROPS OPHTHALMIC NIGHTLY
Status: DISCONTINUED | OUTPATIENT
Start: 2025-08-13 | End: 2025-08-15 | Stop reason: HOSPADM

## 2025-08-13 RX ORDER — FENTANYL CITRATE 50 UG/ML
25 INJECTION, SOLUTION INTRAMUSCULAR; INTRAVENOUS
Refills: 0 | Status: COMPLETED | OUTPATIENT
Start: 2025-08-13 | End: 2025-08-13

## 2025-08-13 RX ORDER — INSULIN ASPART 100 [IU]/ML
0-10 INJECTION, SOLUTION INTRAVENOUS; SUBCUTANEOUS
Status: DISCONTINUED | OUTPATIENT
Start: 2025-08-13 | End: 2025-08-15 | Stop reason: HOSPADM

## 2025-08-13 RX ORDER — IBUPROFEN 200 MG
24 TABLET ORAL
Status: DISCONTINUED | OUTPATIENT
Start: 2025-08-13 | End: 2025-08-15 | Stop reason: HOSPADM

## 2025-08-13 RX ORDER — GLUCAGON 1 MG
1 KIT INJECTION
Status: DISCONTINUED | OUTPATIENT
Start: 2025-08-13 | End: 2025-08-14

## 2025-08-13 RX ORDER — PROCHLORPERAZINE EDISYLATE 5 MG/ML
2.5 INJECTION INTRAMUSCULAR; INTRAVENOUS EVERY 6 HOURS PRN
Status: DISCONTINUED | OUTPATIENT
Start: 2025-08-13 | End: 2025-08-15 | Stop reason: HOSPADM

## 2025-08-13 RX ORDER — IBUPROFEN 200 MG
24 TABLET ORAL
Status: DISCONTINUED | OUTPATIENT
Start: 2025-08-13 | End: 2025-08-14

## 2025-08-13 RX ORDER — GLUCAGON 1 MG
1 KIT INJECTION
Status: DISCONTINUED | OUTPATIENT
Start: 2025-08-13 | End: 2025-08-13

## 2025-08-13 RX ORDER — ATROPINE SULFATE 0.1 MG/ML
INJECTION INTRAVENOUS
Status: COMPLETED
Start: 2025-08-13 | End: 2025-08-13

## 2025-08-13 RX ORDER — ACETAMINOPHEN 500 MG
1000 TABLET ORAL EVERY 8 HOURS
Status: DISCONTINUED | OUTPATIENT
Start: 2025-08-13 | End: 2025-08-15 | Stop reason: HOSPADM

## 2025-08-13 RX ADMIN — Medication 400 MG: at 09:08

## 2025-08-13 RX ADMIN — INSULIN ASPART 2 UNITS: 100 INJECTION, SOLUTION INTRAVENOUS; SUBCUTANEOUS at 09:08

## 2025-08-13 RX ADMIN — INSULIN GLARGINE 5 UNITS: 100 INJECTION, SOLUTION SUBCUTANEOUS at 09:08

## 2025-08-13 RX ADMIN — INSULIN ASPART 2 UNITS: 100 INJECTION, SOLUTION INTRAVENOUS; SUBCUTANEOUS at 05:08

## 2025-08-13 RX ADMIN — INSULIN ASPART 4 UNITS: 100 INJECTION, SOLUTION INTRAVENOUS; SUBCUTANEOUS at 12:08

## 2025-08-13 RX ADMIN — EMPAGLIFLOZIN 10 MG: 10 TABLET, FILM COATED ORAL at 09:08

## 2025-08-13 RX ADMIN — ATROPINE SULFATE 1 MG: 0.1 INJECTION INTRAVENOUS at 03:08

## 2025-08-13 RX ADMIN — INSULIN ASPART 4 UNITS: 100 INJECTION, SOLUTION INTRAVENOUS; SUBCUTANEOUS at 09:08

## 2025-08-13 RX ADMIN — POLYETHYLENE GLYCOL 3350 17 G: 17 POWDER, FOR SOLUTION ORAL at 09:08

## 2025-08-13 RX ADMIN — FUROSEMIDE 40 MG: 10 INJECTION, SOLUTION INTRAVENOUS at 09:08

## 2025-08-13 RX ADMIN — PANCRELIPASE 1 CAPSULE: 120000; 24000; 76000 CAPSULE, DELAYED RELEASE PELLETS ORAL at 09:08

## 2025-08-13 RX ADMIN — INSULIN ASPART 3 UNITS: 100 INJECTION, SOLUTION INTRAVENOUS; SUBCUTANEOUS at 05:08

## 2025-08-13 RX ADMIN — ATORVASTATIN CALCIUM 20 MG: 20 TABLET, FILM COATED ORAL at 09:08

## 2025-08-13 RX ADMIN — ACETAMINOPHEN 1000 MG: 500 TABLET ORAL at 09:08

## 2025-08-13 RX ADMIN — PROCHLORPERAZINE EDISYLATE 2.5 MG: 5 INJECTION INTRAMUSCULAR; INTRAVENOUS at 09:08

## 2025-08-13 RX ADMIN — PREGABALIN 50 MG: 25 CAPSULE ORAL at 02:08

## 2025-08-13 RX ADMIN — FUROSEMIDE 40 MG: 10 INJECTION, SOLUTION INTRAVENOUS at 03:08

## 2025-08-13 RX ADMIN — FENTANYL CITRATE 25 MCG: 50 INJECTION INTRAMUSCULAR; INTRAVENOUS at 03:08

## 2025-08-13 RX ADMIN — LABETALOL HYDROCHLORIDE 10 MG: 5 INJECTION INTRAVENOUS at 03:08

## 2025-08-13 RX ADMIN — ACETAMINOPHEN 1000 MG: 500 TABLET ORAL at 02:08

## 2025-08-13 RX ADMIN — LOSARTAN POTASSIUM 100 MG: 50 TABLET, FILM COATED ORAL at 09:08

## 2025-08-13 RX ADMIN — LATANOPROST 1 DROP: 50 SOLUTION OPHTHALMIC at 09:08

## 2025-08-14 ENCOUNTER — CLINICAL SUPPORT (OUTPATIENT)
Dept: CARDIOLOGY | Facility: HOSPITAL | Age: 89
End: 2025-08-14
Payer: MEDICARE

## 2025-08-14 ENCOUNTER — CLINICAL SUPPORT (OUTPATIENT)
Dept: CARDIOLOGY | Facility: HOSPITAL | Age: 89
End: 2025-08-14
Attending: INTERNAL MEDICINE
Payer: MEDICARE

## 2025-08-14 ENCOUNTER — ANESTHESIA EVENT (OUTPATIENT)
Dept: MEDSURG UNIT | Facility: HOSPITAL | Age: 89
End: 2025-08-14
Payer: MEDICARE

## 2025-08-14 ENCOUNTER — ANESTHESIA (OUTPATIENT)
Dept: MEDSURG UNIT | Facility: HOSPITAL | Age: 89
End: 2025-08-14
Payer: MEDICARE

## 2025-08-14 DIAGNOSIS — Z95.0 PRESENCE OF CARDIAC PACEMAKER: ICD-10-CM

## 2025-08-14 PROBLEM — N18.31 ACUTE KIDNEY INJURY SUPERIMPOSED ON STAGE 3A CHRONIC KIDNEY DISEASE: Status: ACTIVE | Noted: 2024-03-31

## 2025-08-14 PROBLEM — R00.1 BRADYCARDIA: Status: RESOLVED | Noted: 2025-08-13 | Resolved: 2025-08-14

## 2025-08-14 PROBLEM — E83.39 HYPERPHOSPHATEMIA: Status: RESOLVED | Noted: 2022-10-27 | Resolved: 2025-08-14

## 2025-08-14 PROBLEM — R55 SYNCOPE AND COLLAPSE: Status: RESOLVED | Noted: 2025-08-13 | Resolved: 2025-08-14

## 2025-08-14 LAB
ALBUMIN SERPL BCP-MCNC: 3.5 G/DL (ref 3.5–5.2)
ALP SERPL-CCNC: 130 UNIT/L (ref 40–150)
ALT SERPL W/O P-5'-P-CCNC: 20 UNIT/L (ref 0–55)
ANION GAP (OHS): 12 MMOL/L (ref 8–16)
AST SERPL-CCNC: 26 UNIT/L (ref 0–50)
BILIRUB SERPL-MCNC: 0.8 MG/DL (ref 0.1–1)
BUN SERPL-MCNC: 39 MG/DL (ref 8–23)
CALCIUM SERPL-MCNC: 8.4 MG/DL (ref 8.7–10.5)
CHLORIDE SERPL-SCNC: 101 MMOL/L (ref 95–110)
CO2 SERPL-SCNC: 25 MMOL/L (ref 23–29)
CREAT SERPL-MCNC: 1.9 MG/DL (ref 0.5–1.4)
CREAT UR-MCNC: 59 MG/DL (ref 15–325)
EAG (OHS): 131 MG/DL (ref 68–131)
GFR SERPLBLD CREATININE-BSD FMLA CKD-EPI: 25 ML/MIN/1.73/M2
GLUCOSE SERPL-MCNC: 132 MG/DL (ref 70–110)
HBA1C MFR BLD: 6.2 % (ref 4–5.6)
HOLD SPECIMEN: NORMAL
MAGNESIUM SERPL-MCNC: 2.1 MG/DL (ref 1.6–2.6)
OHS CV CPX PATIENT HEIGHT IN: 65
OHS QRS DURATION: 122 MS
OHS QRS DURATION: 130 MS
OHS QTC CALCULATION: 543 MS
OHS QTC CALCULATION: 578 MS
PHOSPHATE SERPL-MCNC: 4.6 MG/DL (ref 2.7–4.5)
POCT GLUCOSE: 131 MG/DL (ref 70–110)
POCT GLUCOSE: 161 MG/DL (ref 70–110)
POCT GLUCOSE: 174 MG/DL (ref 70–110)
POTASSIUM SERPL-SCNC: 3.5 MMOL/L (ref 3.5–5.1)
PROT SERPL-MCNC: 6.3 GM/DL (ref 6–8.4)
SODIUM SERPL-SCNC: 138 MMOL/L (ref 136–145)
SODIUM UR-SCNC: 50 MMOL/L (ref 20–250)
UUN UR-MCNC: 346 MG/DL (ref 140–1050)

## 2025-08-14 PROCEDURE — 93010 ELECTROCARDIOGRAM REPORT: CPT | Mod: ,,, | Performed by: INTERNAL MEDICINE

## 2025-08-14 PROCEDURE — 25000003 PHARM REV CODE 250: Performed by: NURSE ANESTHETIST, CERTIFIED REGISTERED

## 2025-08-14 PROCEDURE — C1786 PMKR, SINGLE, RATE-RESP: HCPCS | Performed by: INTERNAL MEDICINE

## 2025-08-14 PROCEDURE — 25000003 PHARM REV CODE 250

## 2025-08-14 PROCEDURE — C1769 GUIDE WIRE: HCPCS | Performed by: INTERNAL MEDICINE

## 2025-08-14 PROCEDURE — 63600175 PHARM REV CODE 636 W HCPCS: Performed by: NURSE ANESTHETIST, CERTIFIED REGISTERED

## 2025-08-14 PROCEDURE — 36415 COLL VENOUS BLD VENIPUNCTURE: CPT

## 2025-08-14 PROCEDURE — 84100 ASSAY OF PHOSPHORUS: CPT

## 2025-08-14 PROCEDURE — 33274 TCAT INSJ/RPL PERM LDLS PM: CPT | Performed by: INTERNAL MEDICINE

## 2025-08-14 PROCEDURE — 97116 GAIT TRAINING THERAPY: CPT | Mod: CQ

## 2025-08-14 PROCEDURE — 51798 US URINE CAPACITY MEASURE: CPT

## 2025-08-14 PROCEDURE — 84300 ASSAY OF URINE SODIUM: CPT

## 2025-08-14 PROCEDURE — 97535 SELF CARE MNGMENT TRAINING: CPT

## 2025-08-14 PROCEDURE — 37000009 HC ANESTHESIA EA ADD 15 MINS: Performed by: INTERNAL MEDICINE

## 2025-08-14 PROCEDURE — 83735 ASSAY OF MAGNESIUM: CPT

## 2025-08-14 PROCEDURE — 37000008 HC ANESTHESIA 1ST 15 MINUTES: Performed by: INTERNAL MEDICINE

## 2025-08-14 PROCEDURE — 93005 ELECTROCARDIOGRAM TRACING: CPT

## 2025-08-14 PROCEDURE — 02HK3NZ INSERTION OF INTRACARDIAC PACEMAKER INTO RIGHT VENTRICLE, PERCUTANEOUS APPROACH: ICD-10-PCS | Performed by: INTERNAL MEDICINE

## 2025-08-14 PROCEDURE — 63600175 PHARM REV CODE 636 W HCPCS: Performed by: INTERNAL MEDICINE

## 2025-08-14 PROCEDURE — 84540 ASSAY OF URINE/UREA-N: CPT

## 2025-08-14 PROCEDURE — 20600001 HC STEP DOWN PRIVATE ROOM

## 2025-08-14 PROCEDURE — 80053 COMPREHEN METABOLIC PANEL: CPT

## 2025-08-14 PROCEDURE — C1894 INTRO/SHEATH, NON-LASER: HCPCS | Performed by: INTERNAL MEDICINE

## 2025-08-14 PROCEDURE — 33274 TCAT INSJ/RPL PERM LDLS PM: CPT | Mod: Q0,,, | Performed by: INTERNAL MEDICINE

## 2025-08-14 PROCEDURE — 83036 HEMOGLOBIN GLYCOSYLATED A1C: CPT

## 2025-08-14 PROCEDURE — 82570 ASSAY OF URINE CREATININE: CPT

## 2025-08-14 DEVICE — IMPLANTABLE DEVICE: Type: IMPLANTABLE DEVICE | Site: HEART | Status: FUNCTIONAL

## 2025-08-14 RX ORDER — HEPARIN SOD,PORCINE/0.9 % NACL 1000/500ML
INTRAVENOUS SOLUTION INTRAVENOUS
Status: DISCONTINUED | OUTPATIENT
Start: 2025-08-14 | End: 2025-08-15

## 2025-08-14 RX ORDER — DEXMEDETOMIDINE HYDROCHLORIDE 100 UG/ML
INJECTION, SOLUTION INTRAVENOUS
Status: DISCONTINUED | OUTPATIENT
Start: 2025-08-14 | End: 2025-08-14

## 2025-08-14 RX ORDER — ETOMIDATE 2 MG/ML
INJECTION INTRAVENOUS
Status: DISCONTINUED | OUTPATIENT
Start: 2025-08-14 | End: 2025-08-14

## 2025-08-14 RX ORDER — POTASSIUM CHLORIDE 750 MG/1
10 CAPSULE, EXTENDED RELEASE ORAL ONCE
Status: DISCONTINUED | OUTPATIENT
Start: 2025-08-14 | End: 2025-08-14

## 2025-08-14 RX ORDER — LIDOCAINE HYDROCHLORIDE 20 MG/ML
INJECTION INTRAVENOUS
Status: DISCONTINUED | OUTPATIENT
Start: 2025-08-14 | End: 2025-08-14

## 2025-08-14 RX ORDER — CEFAZOLIN 2 G/1
2 INJECTION, POWDER, FOR SOLUTION INTRAMUSCULAR; INTRAVENOUS
Status: DISCONTINUED | OUTPATIENT
Start: 2025-08-14 | End: 2025-08-14 | Stop reason: HOSPADM

## 2025-08-14 RX ORDER — HEPARIN SODIUM 1000 [USP'U]/ML
INJECTION, SOLUTION INTRAVENOUS; SUBCUTANEOUS
Status: DISCONTINUED | OUTPATIENT
Start: 2025-08-14 | End: 2025-08-14

## 2025-08-14 RX ORDER — INSULIN ASPART 100 [IU]/ML
0-10 INJECTION, SOLUTION INTRAVENOUS; SUBCUTANEOUS
Status: CANCELLED | OUTPATIENT
Start: 2025-08-14

## 2025-08-14 RX ORDER — POTASSIUM CHLORIDE 20 MEQ/1
40 TABLET, EXTENDED RELEASE ORAL ONCE
Status: COMPLETED | OUTPATIENT
Start: 2025-08-14 | End: 2025-08-14

## 2025-08-14 RX ORDER — ACETAMINOPHEN 325 MG/1
650 TABLET ORAL EVERY 4 HOURS PRN
Status: DISCONTINUED | OUTPATIENT
Start: 2025-08-14 | End: 2025-08-15 | Stop reason: HOSPADM

## 2025-08-14 RX ORDER — PHENYLEPHRINE HCL IN 0.9% NACL 20MG/250ML
0-5 PLASTIC BAG, INJECTION (ML) INTRAVENOUS CONTINUOUS
Status: DISCONTINUED | OUTPATIENT
Start: 2025-08-14 | End: 2025-08-14

## 2025-08-14 RX ORDER — PHENYLEPHRINE HYDROCHLORIDE 10 MG/ML
INJECTION INTRAVENOUS
Status: DISCONTINUED | OUTPATIENT
Start: 2025-08-14 | End: 2025-08-14

## 2025-08-14 RX ORDER — LIDOCAINE HYDROCHLORIDE 20 MG/ML
INJECTION, SOLUTION INFILTRATION; PERINEURAL
Status: DISCONTINUED | OUTPATIENT
Start: 2025-08-14 | End: 2025-08-15

## 2025-08-14 RX ADMIN — ATORVASTATIN CALCIUM 20 MG: 20 TABLET, FILM COATED ORAL at 09:08

## 2025-08-14 RX ADMIN — PREGABALIN 50 MG: 25 CAPSULE ORAL at 08:08

## 2025-08-14 RX ADMIN — LIDOCAINE HYDROCHLORIDE 20 MG: 20 INJECTION INTRAVENOUS at 01:08

## 2025-08-14 RX ADMIN — POLYETHYLENE GLYCOL 3350 17 G: 17 POWDER, FOR SOLUTION ORAL at 08:08

## 2025-08-14 RX ADMIN — INSULIN ASPART 2 UNITS: 100 INJECTION, SOLUTION INTRAVENOUS; SUBCUTANEOUS at 08:08

## 2025-08-14 RX ADMIN — ETOMIDATE 4 MG: 2 INJECTION INTRAVENOUS at 01:08

## 2025-08-14 RX ADMIN — Medication 400 MG: at 09:08

## 2025-08-14 RX ADMIN — PHENYLEPHRINE HYDROCHLORIDE 0.25 MCG/KG/MIN: 10 INJECTION INTRAVENOUS at 01:08

## 2025-08-14 RX ADMIN — HEPARIN SODIUM 2000 UNITS: 1000 INJECTION, SOLUTION INTRAVENOUS; SUBCUTANEOUS at 02:08

## 2025-08-14 RX ADMIN — INSULIN ASPART 2 UNITS: 100 INJECTION, SOLUTION INTRAVENOUS; SUBCUTANEOUS at 09:08

## 2025-08-14 RX ADMIN — INSULIN ASPART 2 UNITS: 100 INJECTION, SOLUTION INTRAVENOUS; SUBCUTANEOUS at 12:08

## 2025-08-14 RX ADMIN — EMPAGLIFLOZIN 10 MG: 10 TABLET, FILM COATED ORAL at 08:08

## 2025-08-14 RX ADMIN — PANCRELIPASE 1 CAPSULE: 120000; 24000; 76000 CAPSULE, DELAYED RELEASE PELLETS ORAL at 08:08

## 2025-08-14 RX ADMIN — SODIUM CHLORIDE: 9 INJECTION, SOLUTION INTRAVENOUS at 01:08

## 2025-08-14 RX ADMIN — ACETAMINOPHEN 1000 MG: 500 TABLET ORAL at 06:08

## 2025-08-14 RX ADMIN — INSULIN GLARGINE 5 UNITS: 100 INJECTION, SOLUTION SUBCUTANEOUS at 09:08

## 2025-08-14 RX ADMIN — LOSARTAN POTASSIUM 100 MG: 50 TABLET, FILM COATED ORAL at 08:08

## 2025-08-14 RX ADMIN — DEXMEDETOMIDINE HYDROCHLORIDE 1 MCG/KG/HR: 100 INJECTION, SOLUTION, CONCENTRATE INTRAVENOUS at 01:08

## 2025-08-14 RX ADMIN — POTASSIUM CHLORIDE 40 MEQ: 1500 TABLET, EXTENDED RELEASE ORAL at 06:08

## 2025-08-14 RX ADMIN — ETOMIDATE 4 MG: 2 INJECTION INTRAVENOUS at 02:08

## 2025-08-14 RX ADMIN — Medication 400 MG: at 08:08

## 2025-08-14 RX ADMIN — VANCOMYCIN HYDROCHLORIDE 1000 MG: 1 INJECTION, POWDER, LYOPHILIZED, FOR SOLUTION INTRAVENOUS at 01:08

## 2025-08-14 RX ADMIN — PHENYLEPHRINE HYDROCHLORIDE 50 MCG: 10 INJECTION INTRAVENOUS at 02:08

## 2025-08-14 RX ADMIN — LATANOPROST 1 DROP: 50 SOLUTION OPHTHALMIC at 09:08

## 2025-08-14 RX ADMIN — DEXMEDETOMIDINE 40 MCG: 200 INJECTION, SOLUTION INTRAVENOUS at 01:08

## 2025-08-14 RX ADMIN — ACETAMINOPHEN 1000 MG: 500 TABLET ORAL at 09:08

## 2025-08-15 ENCOUNTER — DOCUMENTATION ONLY (OUTPATIENT)
Dept: CARDIOLOGY | Facility: CLINIC | Age: 89
End: 2025-08-15
Payer: MEDICARE

## 2025-08-15 VITALS
DIASTOLIC BLOOD PRESSURE: 59 MMHG | BODY MASS INDEX: 18.22 KG/M2 | SYSTOLIC BLOOD PRESSURE: 130 MMHG | RESPIRATION RATE: 18 BRPM | TEMPERATURE: 98 F | HEART RATE: 70 BPM | OXYGEN SATURATION: 98 % | HEIGHT: 65 IN | WEIGHT: 109.38 LBS

## 2025-08-15 DIAGNOSIS — R06.02 SOB (SHORTNESS OF BREATH): Primary | ICD-10-CM

## 2025-08-15 PROBLEM — I48.0 PAF (PAROXYSMAL ATRIAL FIBRILLATION): Status: ACTIVE | Noted: 2025-08-15

## 2025-08-15 LAB
ALBUMIN SERPL BCP-MCNC: 3.1 G/DL (ref 3.5–5.2)
ALP SERPL-CCNC: 117 UNIT/L (ref 40–150)
ALT SERPL W/O P-5'-P-CCNC: 19 UNIT/L (ref 0–55)
ANION GAP (OHS): 9 MMOL/L (ref 8–16)
AST SERPL-CCNC: 23 UNIT/L (ref 0–50)
BILIRUB SERPL-MCNC: 0.4 MG/DL (ref 0.1–1)
BUN SERPL-MCNC: 43 MG/DL (ref 8–23)
CALCIUM SERPL-MCNC: 7.9 MG/DL (ref 8.7–10.5)
CHLORIDE SERPL-SCNC: 110 MMOL/L (ref 95–110)
CO2 SERPL-SCNC: 21 MMOL/L (ref 23–29)
CREAT SERPL-MCNC: 1.4 MG/DL (ref 0.5–1.4)
GFR SERPLBLD CREATININE-BSD FMLA CKD-EPI: 36 ML/MIN/1.73/M2
GLUCOSE SERPL-MCNC: 105 MG/DL (ref 70–110)
MAGNESIUM SERPL-MCNC: 2.1 MG/DL (ref 1.6–2.6)
PHOSPHATE SERPL-MCNC: 3.4 MG/DL (ref 2.7–4.5)
POCT GLUCOSE: 113 MG/DL (ref 70–110)
POCT GLUCOSE: 117 MG/DL (ref 70–110)
POCT GLUCOSE: 206 MG/DL (ref 70–110)
POCT GLUCOSE: 218 MG/DL (ref 70–110)
POTASSIUM SERPL-SCNC: 3.6 MMOL/L (ref 3.5–5.1)
PROT SERPL-MCNC: 5.7 GM/DL (ref 6–8.4)
SODIUM SERPL-SCNC: 140 MMOL/L (ref 136–145)
VIT B12 SERPL-MCNC: 287 PG/ML (ref 210–950)

## 2025-08-15 PROCEDURE — 97530 THERAPEUTIC ACTIVITIES: CPT

## 2025-08-15 PROCEDURE — 82607 VITAMIN B-12: CPT | Performed by: INTERNAL MEDICINE

## 2025-08-15 PROCEDURE — 97535 SELF CARE MNGMENT TRAINING: CPT

## 2025-08-15 PROCEDURE — 83735 ASSAY OF MAGNESIUM: CPT

## 2025-08-15 PROCEDURE — 97164 PT RE-EVAL EST PLAN CARE: CPT

## 2025-08-15 PROCEDURE — 36415 COLL VENOUS BLD VENIPUNCTURE: CPT | Performed by: INTERNAL MEDICINE

## 2025-08-15 PROCEDURE — 25000003 PHARM REV CODE 250

## 2025-08-15 PROCEDURE — 83921 ORGANIC ACID SINGLE QUANT: CPT | Performed by: INTERNAL MEDICINE

## 2025-08-15 PROCEDURE — 80053 COMPREHEN METABOLIC PANEL: CPT

## 2025-08-15 PROCEDURE — 84100 ASSAY OF PHOSPHORUS: CPT

## 2025-08-15 PROCEDURE — 97168 OT RE-EVAL EST PLAN CARE: CPT

## 2025-08-15 RX ORDER — CHLORHEXIDINE GLUCONATE ORAL RINSE 1.2 MG/ML
SOLUTION DENTAL
Status: ON HOLD | COMMUNITY
Start: 2025-05-20 | End: 2025-08-15 | Stop reason: HOSPADM

## 2025-08-15 RX ORDER — IBUPROFEN 600 MG/1
600 TABLET, FILM COATED ORAL EVERY 6 HOURS PRN
COMMUNITY
Start: 2025-05-20

## 2025-08-15 RX ORDER — POTASSIUM CHLORIDE 750 MG/1
30 CAPSULE, EXTENDED RELEASE ORAL ONCE
Status: COMPLETED | OUTPATIENT
Start: 2025-08-15 | End: 2025-08-15

## 2025-08-15 RX ORDER — HYDROCODONE BITARTRATE AND ACETAMINOPHEN 7.5; 325 MG/1; MG/1
1 TABLET ORAL EVERY 6 HOURS PRN
COMMUNITY
Start: 2025-05-20

## 2025-08-15 RX ADMIN — INSULIN ASPART 4 UNITS: 100 INJECTION, SOLUTION INTRAVENOUS; SUBCUTANEOUS at 11:08

## 2025-08-15 RX ADMIN — ACETAMINOPHEN 1000 MG: 500 TABLET ORAL at 07:08

## 2025-08-15 RX ADMIN — LOSARTAN POTASSIUM 100 MG: 50 TABLET, FILM COATED ORAL at 08:08

## 2025-08-15 RX ADMIN — INSULIN ASPART 3 UNITS: 100 INJECTION, SOLUTION INTRAVENOUS; SUBCUTANEOUS at 11:08

## 2025-08-15 RX ADMIN — ACETAMINOPHEN 1000 MG: 500 TABLET ORAL at 02:08

## 2025-08-15 RX ADMIN — INSULIN ASPART 3 UNITS: 100 INJECTION, SOLUTION INTRAVENOUS; SUBCUTANEOUS at 07:08

## 2025-08-15 RX ADMIN — INSULIN ASPART 3 UNITS: 100 INJECTION, SOLUTION INTRAVENOUS; SUBCUTANEOUS at 05:08

## 2025-08-15 RX ADMIN — Medication 400 MG: at 08:08

## 2025-08-15 RX ADMIN — EMPAGLIFLOZIN 10 MG: 10 TABLET, FILM COATED ORAL at 08:08

## 2025-08-15 RX ADMIN — POTASSIUM CHLORIDE 30 MEQ: 750 CAPSULE, EXTENDED RELEASE ORAL at 08:08

## 2025-08-15 RX ADMIN — PANCRELIPASE 1 CAPSULE: 120000; 24000; 76000 CAPSULE, DELAYED RELEASE PELLETS ORAL at 08:08

## 2025-08-16 LAB — HOLD SPECIMEN: NORMAL

## 2025-08-18 ENCOUNTER — TELEPHONE (OUTPATIENT)
Dept: CARDIOLOGY | Facility: CLINIC | Age: 89
End: 2025-08-18
Payer: MEDICARE

## 2025-08-18 DIAGNOSIS — Z95.9 CARDIAC DEVICE IN SITU: ICD-10-CM

## 2025-08-18 DIAGNOSIS — I44.2 CHB (COMPLETE HEART BLOCK): Primary | ICD-10-CM

## 2025-08-19 ENCOUNTER — TELEPHONE (OUTPATIENT)
Dept: CARDIOLOGY | Facility: CLINIC | Age: 89
End: 2025-08-19
Payer: MEDICARE

## 2025-08-19 LAB — W METHYLMALONIC ACID: 1.22 UMOL/L

## 2025-08-20 ENCOUNTER — TELEPHONE (OUTPATIENT)
Dept: INTERNAL MEDICINE | Facility: CLINIC | Age: 89
End: 2025-08-20
Payer: MEDICARE

## 2025-08-22 ENCOUNTER — CLINICAL SUPPORT (OUTPATIENT)
Dept: CARDIOLOGY | Facility: HOSPITAL | Age: 89
End: 2025-08-22
Attending: INTERNAL MEDICINE
Payer: MEDICARE

## 2025-08-22 DIAGNOSIS — I44.2 CHB (COMPLETE HEART BLOCK): ICD-10-CM

## 2025-08-22 DIAGNOSIS — Z95.9 CARDIAC DEVICE IN SITU: ICD-10-CM

## 2025-08-22 PROCEDURE — 93279 PRGRMG DEV EVAL PM/LDLS PM: CPT

## 2025-08-25 ENCOUNTER — OFFICE VISIT (OUTPATIENT)
Dept: INTERNAL MEDICINE | Facility: CLINIC | Age: 89
End: 2025-08-25
Payer: MEDICARE

## 2025-08-25 VITALS
DIASTOLIC BLOOD PRESSURE: 60 MMHG | BODY MASS INDEX: 17.91 KG/M2 | HEART RATE: 68 BPM | OXYGEN SATURATION: 98 % | WEIGHT: 107.5 LBS | HEIGHT: 65 IN | SYSTOLIC BLOOD PRESSURE: 128 MMHG

## 2025-08-25 DIAGNOSIS — R21 RASH: ICD-10-CM

## 2025-08-25 DIAGNOSIS — R80.9 TYPE 2 DIABETES MELLITUS WITH MICROALBUMINURIA, WITHOUT LONG-TERM CURRENT USE OF INSULIN: Chronic | ICD-10-CM

## 2025-08-25 DIAGNOSIS — I48.0 PAROXYSMAL ATRIAL FIBRILLATION: Primary | ICD-10-CM

## 2025-08-25 DIAGNOSIS — G62.9 NEUROPATHY: ICD-10-CM

## 2025-08-25 DIAGNOSIS — L50.9 URTICARIA: ICD-10-CM

## 2025-08-25 DIAGNOSIS — R22.41 MASS OF RIGHT THIGH: ICD-10-CM

## 2025-08-25 DIAGNOSIS — Z95.0 S/P PLACEMENT OF CARDIAC PACEMAKER: Chronic | ICD-10-CM

## 2025-08-25 DIAGNOSIS — R55 SYNCOPE, UNSPECIFIED SYNCOPE TYPE: ICD-10-CM

## 2025-08-25 DIAGNOSIS — Z09 HOSPITAL DISCHARGE FOLLOW-UP: Primary | ICD-10-CM

## 2025-08-25 DIAGNOSIS — E11.29 TYPE 2 DIABETES MELLITUS WITH MICROALBUMINURIA, WITHOUT LONG-TERM CURRENT USE OF INSULIN: Chronic | ICD-10-CM

## 2025-08-25 DIAGNOSIS — R00.1 BRADYCARDIA: ICD-10-CM

## 2025-08-25 PROCEDURE — 99215 OFFICE O/P EST HI 40 MIN: CPT | Mod: PBBFAC,PO | Performed by: INTERNAL MEDICINE

## 2025-08-25 PROCEDURE — 99495 TRANSJ CARE MGMT MOD F2F 14D: CPT | Mod: S$PBB,,, | Performed by: INTERNAL MEDICINE

## 2025-08-25 PROCEDURE — 99999 PR PBB SHADOW E&M-EST. PATIENT-LVL V: CPT | Mod: PBBFAC,,, | Performed by: INTERNAL MEDICINE

## 2025-08-25 RX ORDER — GABAPENTIN 100 MG/1
100-200 CAPSULE ORAL NIGHTLY
Qty: 60 CAPSULE | Refills: 0 | Status: SHIPPED | OUTPATIENT
Start: 2025-08-25 | End: 2026-08-25

## 2025-08-27 ENCOUNTER — OUTPATIENT CASE MANAGEMENT (OUTPATIENT)
Dept: ADMINISTRATIVE | Facility: OTHER | Age: 89
End: 2025-08-27
Payer: MEDICARE

## 2025-08-27 LAB
OHS CV DC REMOTE DEVICE TYPE: NORMAL
OHS CV RV PACING PERCENT: 1.2 %

## 2025-08-28 ENCOUNTER — LAB VISIT (OUTPATIENT)
Dept: LAB | Facility: HOSPITAL | Age: 89
End: 2025-08-28
Payer: MEDICARE

## 2025-08-28 ENCOUNTER — INFUSION (OUTPATIENT)
Dept: INFECTIOUS DISEASES | Facility: HOSPITAL | Age: 89
End: 2025-08-28
Payer: MEDICARE

## 2025-08-28 ENCOUNTER — RESULTS FOLLOW-UP (OUTPATIENT)
Dept: INTERNAL MEDICINE | Facility: CLINIC | Age: 89
End: 2025-08-28
Payer: MEDICARE

## 2025-08-28 VITALS
OXYGEN SATURATION: 98 % | RESPIRATION RATE: 16 BRPM | SYSTOLIC BLOOD PRESSURE: 164 MMHG | BODY MASS INDEX: 18.44 KG/M2 | HEIGHT: 65 IN | DIASTOLIC BLOOD PRESSURE: 78 MMHG | HEART RATE: 78 BPM | WEIGHT: 110.69 LBS | TEMPERATURE: 99 F

## 2025-08-28 DIAGNOSIS — M81.0 SENILE OSTEOPOROSIS: Primary | ICD-10-CM

## 2025-08-28 DIAGNOSIS — E11.29 TYPE 2 DIABETES MELLITUS WITH MICROALBUMINURIA, WITHOUT LONG-TERM CURRENT USE OF INSULIN: Chronic | ICD-10-CM

## 2025-08-28 DIAGNOSIS — R80.9 TYPE 2 DIABETES MELLITUS WITH MICROALBUMINURIA, WITHOUT LONG-TERM CURRENT USE OF INSULIN: Chronic | ICD-10-CM

## 2025-08-28 LAB
ALBUMIN/CREAT UR: 70.7 UG/MG
CREAT UR-MCNC: 82 MG/DL (ref 15–325)
MICROALBUMIN UR-MCNC: 58 UG/ML

## 2025-08-28 PROCEDURE — 82043 UR ALBUMIN QUANTITATIVE: CPT

## 2025-08-28 PROCEDURE — 96372 THER/PROPH/DIAG INJ SC/IM: CPT

## 2025-08-28 PROCEDURE — 63600175 PHARM REV CODE 636 W HCPCS: Mod: JZ,TB | Performed by: NURSE PRACTITIONER

## 2025-08-28 RX ADMIN — ROMOSOZUMAB-AQQG 210 MG: 105 INJECTION, SOLUTION SUBCUTANEOUS at 12:08

## 2025-09-01 DIAGNOSIS — J45.40 MODERATE PERSISTENT ASTHMA WITHOUT COMPLICATION: ICD-10-CM

## 2025-09-02 ENCOUNTER — TELEPHONE (OUTPATIENT)
Dept: OTOLARYNGOLOGY | Facility: CLINIC | Age: 89
End: 2025-09-02
Payer: MEDICARE

## 2025-09-02 RX ORDER — FLUTICASONE FUROATE, UMECLIDINIUM BROMIDE AND VILANTEROL TRIFENATATE 200; 62.5; 25 UG/1; UG/1; UG/1
1 POWDER RESPIRATORY (INHALATION) DAILY
Qty: 90 EACH | Refills: 3
Start: 2025-09-02

## 2025-09-03 ENCOUNTER — HOSPITAL ENCOUNTER (OUTPATIENT)
Dept: CARDIOLOGY | Facility: HOSPITAL | Age: 89
Discharge: HOME | End: 2025-09-03
Attending: INTERNAL MEDICINE
Payer: MEDICARE

## 2025-09-03 ENCOUNTER — TELEPHONE (OUTPATIENT)
Dept: ENDOCRINOLOGY | Facility: CLINIC | Age: 89
End: 2025-09-03
Payer: MEDICARE

## 2025-09-03 DIAGNOSIS — R22.41 MASS OF RIGHT THIGH: ICD-10-CM

## 2025-09-03 PROCEDURE — 93926 LOWER EXTREMITY STUDY: CPT | Mod: 26,,, | Performed by: INTERNAL MEDICINE

## 2025-09-03 PROCEDURE — 93926 LOWER EXTREMITY STUDY: CPT

## 2025-09-03 PROCEDURE — 93971 EXTREMITY STUDY: CPT | Mod: 26,,, | Performed by: INTERNAL MEDICINE

## (undated) DEVICE — SEE MEDLINE ITEM 157194

## (undated) DEVICE — DRAPE C-ARM ELAS CLIP 42X120IN

## (undated) DEVICE — DRAPE C-ARMOR EQUIPMENT COVER

## (undated) DEVICE — SUT VICRYL PLUS 2-0 CT1 18

## (undated) DEVICE — TRAY MINOR ORTHO OMC

## (undated) DEVICE — TRAY CATH FOL SIL URIMTR 16FR

## (undated) DEVICE — PACK EP DRAPE OMC

## (undated) DEVICE — GLOVE BIOGEL ULTRATOUCH 6.5

## (undated) DEVICE — ADHESIVE DERMABOND ADVANCED

## (undated) DEVICE — DRAPE U SPLIT SHEET 54X76IN

## (undated) DEVICE — SHEATH HEMOSTASIS 8.5FR

## (undated) DEVICE — BLADE TRICUT

## (undated) DEVICE — SPONGE DERMACEA 4X4IN 12PLY

## (undated) DEVICE — DRAPE INCISE IOBAN 2 23X17IN

## (undated) DEVICE — STAPLER SKIN PROXIMATE WIDE

## (undated) DEVICE — APPLICATOR CHLORAPREP ORN 26ML

## (undated) DEVICE — Device

## (undated) DEVICE — DRAPE STERI U-SHAPED 47X51IN

## (undated) DEVICE — DRESSING ADH ISLAND 3.6 X 14

## (undated) DEVICE — KIT ANTIFOG

## (undated) DEVICE — CLOSURE SKIN STERI STRIP 1/2X4

## (undated) DEVICE — DRESSING AQUACEL RIBBON 2X45CM

## (undated) DEVICE — PIN PRECISION 3.2-3.9X450MM

## (undated) DEVICE — SOL NACL 0.9% INJ 500ML BG

## (undated) DEVICE — SUT MONOCRYL 3-0 PS-2 UND

## (undated) DEVICE — DRILL T2 ALPH FREHND 4.2X185MM

## (undated) DEVICE — DILATOR VES COONS .038X16X20CM

## (undated) DEVICE — REAMER SHAFT MOD TRINKLE 8X510

## (undated) DEVICE — CANISTER SUCTION 3000CC

## (undated) DEVICE — CUTTER PROXIMATE BLUE 75MM

## (undated) DEVICE — SPONGE COTTON TRAY 4X4IN

## (undated) DEVICE — DRAPE THREE-QTR REINF 53X77IN

## (undated) DEVICE — SUT VICRYL PLUS 3-0 SH 18IN

## (undated) DEVICE — TIP YANKAUERS BULB NO VENT

## (undated) DEVICE — DRAPE CORETEMP FLD WRM 56X62IN

## (undated) DEVICE — SYR 10CC LUER LOCK

## (undated) DEVICE — SEE MEDLINE ITEM 157117

## (undated) DEVICE — COVER MAYO STND XL 30X57IN

## (undated) DEVICE — NDL BOX COUNTER

## (undated) DEVICE — GOWN AERO CHROME W/ TOWEL XL

## (undated) DEVICE — DRESSING N ADH OIL EMUL 3X8

## (undated) DEVICE — KIT PT CARE HANA PROFX SSXT

## (undated) DEVICE — SUT 0 8-18 IN CTD VICRYL

## (undated) DEVICE — ELECTRODE REM PLYHSV RETURN 9

## (undated) DEVICE — RELOAD PROXIMATE CUT BLUE 75MM

## (undated) DEVICE — COVER LIGHT HANDLE 80/CA

## (undated) DEVICE — SUT VICRYL PLUS 0 CT1 18IN

## (undated) DEVICE — SUT MONOCRYL UD 4-0 27 PS-1

## (undated) DEVICE — SUT MONOCRYL 4-0 PS-1 UND

## (undated) DEVICE — SEE L#120831

## (undated) DEVICE — DRESSING TRANS 4X4 TEGADERM

## (undated) DEVICE — TAPE SURG DURAPORE 2 X10YD

## (undated) DEVICE — SEE MEDLINE ITEM 146313

## (undated) DEVICE — DRAPE TOP 53X102IN

## (undated) DEVICE — SUT CTD VICRYL 2-0 VIL BR

## (undated) DEVICE — DRESSING LEUKOPLAST FLEX 1X3IN

## (undated) DEVICE — SEALER LIGASURE IMPACT 18CM

## (undated) DEVICE — GUIDE WIRE AMPLATZ .035X1 MDTH

## (undated) DEVICE — THREADED RECON K-WIRE
Type: IMPLANTABLE DEVICE | Site: FEMUR | Status: NON-FUNCTIONAL
Removed: 2024-11-25

## (undated) DEVICE — SUT 2-0 MONOCRYL PLUS SH UD

## (undated) DEVICE — DRAPE ABDOMINAL TIBURON 14X11

## (undated) DEVICE — KIT PROBE COVER WITH GEL

## (undated) DEVICE — PAD DEFIB CADENCE ADULT R2

## (undated) DEVICE — WIRE KIRSCHNER T2 3X285MM SS
Type: IMPLANTABLE DEVICE | Site: FEMUR | Status: NON-FUNCTIONAL
Removed: 2024-03-31

## (undated) DEVICE — BOWL STERILE LARGE 32OZ

## (undated) DEVICE — ADHESIVE MASTISOL VIAL 48/BX

## (undated) DEVICE — SOL NS 1000CC

## (undated) DEVICE — DRESSING TELFA N ADH 3X8

## (undated) DEVICE — DRAPE IOBAN 2 STERI

## (undated) DEVICE — BOVIE SUCTION

## (undated) DEVICE — TRACKER PATIENT NON INVASIVE

## (undated) DEVICE — SEE MEDLINE ITEM 157116

## (undated) DEVICE — DRESSING AQUACEL AG RBBN 2X45

## (undated) DEVICE — GUIDE WIRE, BALL-TIPPED, STERILE
Type: IMPLANTABLE DEVICE | Site: FEMUR | Status: NON-FUNCTIONAL
Removed: 2024-11-25

## (undated) DEVICE — SEE MEDLINE ITEM 156902

## (undated) DEVICE — TRACKER ENT INSTRUMENT

## (undated) DEVICE — CONTAINER MULTIPURPOSE/SPECIME

## (undated) DEVICE — SCREW LOCKING BONE T2 5X40MM
Type: IMPLANTABLE DEVICE | Site: FEMUR | Status: NON-FUNCTIONAL
Removed: 2024-03-31

## (undated) DEVICE — VISIPAQUE CONTRAST 320MG/100ML

## (undated) DEVICE — SEE MEDLINE ITEM 156955

## (undated) DEVICE — DILATOR COONS TAPER 14FR

## (undated) DEVICE — SEE MEDLINE ITEM 152622

## (undated) DEVICE — SPONGE NEURO 1/2 X 2

## (undated) DEVICE — POSITIONER HEEL FOAM CONVOLTD

## (undated) DEVICE — COLLECTOR SPECIMAN ARTHRO

## (undated) DEVICE — NDL HYPO REG 25G X 1 1/2

## (undated) DEVICE — PRECISION PIN TAPERED
Type: IMPLANTABLE DEVICE | Site: FEMUR | Status: NON-FUNCTIONAL
Brand: GAMMA
Removed: 2024-11-25

## (undated) DEVICE — GOWN SMART IMP BREATHABLE XXLG

## (undated) DEVICE — SUT CTD VICRYL BR CR/SH VIL

## (undated) DEVICE — SYR ONLY LUER LOCK 20CC

## (undated) DEVICE — SEE MEDLINE ITEM 152529

## (undated) DEVICE — SEE MEDLINE ITEM 154981

## (undated) DEVICE — COVER OVERHEAD SURG LT BLUE

## (undated) DEVICE — GUIDE WIRE 3.0X1000MM BALL TIP
Type: IMPLANTABLE DEVICE | Site: FEMUR | Status: NON-FUNCTIONAL
Removed: 2024-03-31

## (undated) DEVICE — DILATOR VESSEL COONS 18FR 20CM

## (undated) DEVICE — REAMER MOD BIXCUT 8X48MM STER.

## (undated) DEVICE — TUBING XPS IRRIG TO STRAIGHTSH

## (undated) DEVICE — PACK HEAD & NECK

## (undated) DEVICE — LINE PRESSURE MONITORING 96IN